# Patient Record
Sex: FEMALE | Race: BLACK OR AFRICAN AMERICAN | Employment: OTHER | URBAN - METROPOLITAN AREA
[De-identification: names, ages, dates, MRNs, and addresses within clinical notes are randomized per-mention and may not be internally consistent; named-entity substitution may affect disease eponyms.]

---

## 2017-01-11 RX ORDER — DOCUSATE SODIUM 100 MG/1
CAPSULE ORAL
Qty: 30 CAPSULE | Refills: 4 | Status: SHIPPED | OUTPATIENT
Start: 2017-01-11 | End: 2017-07-11 | Stop reason: SDUPTHER

## 2017-01-12 ENCOUNTER — OFFICE VISIT (OUTPATIENT)
Dept: CARDIOLOGY CLINIC | Age: 62
End: 2017-01-12

## 2017-01-12 VITALS
BODY MASS INDEX: 25.52 KG/M2 | HEART RATE: 77 BPM | DIASTOLIC BLOOD PRESSURE: 64 MMHG | HEIGHT: 60 IN | WEIGHT: 130 LBS | OXYGEN SATURATION: 99 % | SYSTOLIC BLOOD PRESSURE: 118 MMHG

## 2017-01-12 DIAGNOSIS — I25.119 CORONARY ARTERY DISEASE INVOLVING NATIVE CORONARY ARTERY OF NATIVE HEART WITH ANGINA PECTORIS (HCC): Primary | ICD-10-CM

## 2017-01-12 PROCEDURE — 99214 OFFICE O/P EST MOD 30 MIN: CPT | Performed by: INTERNAL MEDICINE

## 2017-01-13 ENCOUNTER — TELEPHONE (OUTPATIENT)
Dept: CARDIOLOGY CLINIC | Age: 62
End: 2017-01-13

## 2017-01-16 DIAGNOSIS — R19.7 DIARRHEA, UNSPECIFIED TYPE: ICD-10-CM

## 2017-01-19 ENCOUNTER — OFFICE VISIT (OUTPATIENT)
Dept: PAIN MANAGEMENT | Age: 62
End: 2017-01-19

## 2017-01-19 ENCOUNTER — TELEPHONE (OUTPATIENT)
Dept: PRIMARY CARE CLINIC | Age: 62
End: 2017-01-19

## 2017-01-19 VITALS
BODY MASS INDEX: 25 KG/M2 | WEIGHT: 128 LBS | SYSTOLIC BLOOD PRESSURE: 142 MMHG | DIASTOLIC BLOOD PRESSURE: 79 MMHG | HEART RATE: 89 BPM

## 2017-01-19 DIAGNOSIS — F51.01 PRIMARY INSOMNIA: ICD-10-CM

## 2017-01-19 DIAGNOSIS — M50.30 DDD (DEGENERATIVE DISC DISEASE), CERVICAL: ICD-10-CM

## 2017-01-19 DIAGNOSIS — G89.4 CHRONIC PAIN SYNDROME: ICD-10-CM

## 2017-01-19 DIAGNOSIS — G43.711 HEADACHE, CHRONIC MIGRAINE WITHOUT AURA, INTRACTABLE, WITH STATUS: ICD-10-CM

## 2017-01-19 DIAGNOSIS — F33.9 RECURRENT MAJOR DEPRESSIVE DISORDER, REMISSION STATUS UNSPECIFIED (HCC): ICD-10-CM

## 2017-01-19 DIAGNOSIS — M79.7 FIBROMYALGIA: ICD-10-CM

## 2017-01-19 DIAGNOSIS — F43.10 PTSD (POST-TRAUMATIC STRESS DISORDER): ICD-10-CM

## 2017-01-19 PROCEDURE — 99214 OFFICE O/P EST MOD 30 MIN: CPT | Performed by: INTERNAL MEDICINE

## 2017-01-19 RX ORDER — DULOXETIN HYDROCHLORIDE 30 MG/1
CAPSULE, DELAYED RELEASE ORAL
Qty: 60 CAPSULE | Refills: 0 | Status: SHIPPED | OUTPATIENT
Start: 2017-01-19 | End: 2017-02-20 | Stop reason: SDUPTHER

## 2017-01-19 RX ORDER — OXCARBAZEPINE 300 MG/1
TABLET, FILM COATED ORAL
Qty: 90 TABLET | Refills: 0 | Status: SHIPPED | OUTPATIENT
Start: 2017-01-19 | End: 2017-02-20 | Stop reason: SDUPTHER

## 2017-01-19 RX ORDER — QUETIAPINE FUMARATE 25 MG/1
TABLET, FILM COATED ORAL
Qty: 60 TABLET | Refills: 0 | Status: SHIPPED | OUTPATIENT
Start: 2017-01-19 | End: 2017-02-20 | Stop reason: SDUPTHER

## 2017-01-20 ENCOUNTER — OFFICE VISIT (OUTPATIENT)
Dept: PRIMARY CARE CLINIC | Age: 62
End: 2017-01-20

## 2017-01-20 VITALS
TEMPERATURE: 99.1 F | DIASTOLIC BLOOD PRESSURE: 83 MMHG | SYSTOLIC BLOOD PRESSURE: 176 MMHG | WEIGHT: 127.8 LBS | BODY MASS INDEX: 24.96 KG/M2 | HEART RATE: 93 BPM | OXYGEN SATURATION: 99 %

## 2017-01-20 DIAGNOSIS — R10.10 PAIN OF UPPER ABDOMEN: ICD-10-CM

## 2017-01-20 DIAGNOSIS — R19.7 DIARRHEA, UNSPECIFIED TYPE: Primary | ICD-10-CM

## 2017-01-20 PROCEDURE — 99214 OFFICE O/P EST MOD 30 MIN: CPT | Performed by: FAMILY MEDICINE

## 2017-01-20 ASSESSMENT — ENCOUNTER SYMPTOMS
CHOKING: 0
RHINORRHEA: 0
TROUBLE SWALLOWING: 0
RECTAL PAIN: 0
ABDOMINAL DISTENTION: 0
SINUS PRESSURE: 0
BLOOD IN STOOL: 0
FLATUS: 1
BLOATING: 1
SHORTNESS OF BREATH: 0
APNEA: 0
DIARRHEA: 1
EYE PAIN: 0
NAUSEA: 1
WHEEZING: 0
COLOR CHANGE: 0
STRIDOR: 0
COUGH: 0
PHOTOPHOBIA: 0
CONSTIPATION: 0
ABDOMINAL PAIN: 1
VOMITING: 0
EYE ITCHING: 0
BACK PAIN: 0
EYE REDNESS: 0
EYE DISCHARGE: 0
CHEST TIGHTNESS: 0
SORE THROAT: 0

## 2017-01-24 RX ORDER — LOPERAMIDE HYDROCHLORIDE 2 MG/1
CAPSULE ORAL
Qty: 20 CAPSULE | Refills: 0 | Status: SHIPPED | OUTPATIENT
Start: 2017-01-24 | End: 2017-04-10 | Stop reason: SDUPTHER

## 2017-01-25 ENCOUNTER — TELEPHONE (OUTPATIENT)
Dept: PAIN MANAGEMENT | Age: 62
End: 2017-01-25

## 2017-02-01 ENCOUNTER — TELEPHONE (OUTPATIENT)
Dept: PRIMARY CARE CLINIC | Age: 62
End: 2017-02-01

## 2017-02-03 DIAGNOSIS — E78.2 MIXED HYPERLIPIDEMIA: ICD-10-CM

## 2017-02-03 DIAGNOSIS — I25.10 CORONARY ARTERY DISEASE INVOLVING NATIVE CORONARY ARTERY OF NATIVE HEART WITHOUT ANGINA PECTORIS: ICD-10-CM

## 2017-02-03 RX ORDER — ATORVASTATIN CALCIUM 80 MG/1
TABLET, FILM COATED ORAL
Qty: 90 TABLET | Refills: 0 | Status: SHIPPED | OUTPATIENT
Start: 2017-02-03 | End: 2017-04-26 | Stop reason: SDUPTHER

## 2017-02-07 ENCOUNTER — TELEPHONE (OUTPATIENT)
Dept: PAIN MANAGEMENT | Age: 62
End: 2017-02-07

## 2017-02-10 DIAGNOSIS — K21.9 GERD (GASTROESOPHAGEAL REFLUX DISEASE): ICD-10-CM

## 2017-02-13 RX ORDER — OMEPRAZOLE 40 MG/1
CAPSULE, DELAYED RELEASE ORAL
Qty: 60 CAPSULE | Refills: 2 | Status: ON HOLD | OUTPATIENT
Start: 2017-02-13 | End: 2017-03-19 | Stop reason: HOSPADM

## 2017-02-20 ENCOUNTER — OFFICE VISIT (OUTPATIENT)
Dept: PAIN MANAGEMENT | Age: 62
End: 2017-02-20

## 2017-02-20 VITALS
WEIGHT: 138 LBS | BODY MASS INDEX: 26.95 KG/M2 | HEART RATE: 70 BPM | SYSTOLIC BLOOD PRESSURE: 140 MMHG | DIASTOLIC BLOOD PRESSURE: 81 MMHG

## 2017-02-20 DIAGNOSIS — M79.7 FIBROMYALGIA: ICD-10-CM

## 2017-02-20 DIAGNOSIS — F43.10 PTSD (POST-TRAUMATIC STRESS DISORDER): ICD-10-CM

## 2017-02-20 DIAGNOSIS — G89.4 CHRONIC PAIN SYNDROME: ICD-10-CM

## 2017-02-20 DIAGNOSIS — G43.119 INTRACTABLE MIGRAINE WITH AURA WITHOUT STATUS MIGRAINOSUS: ICD-10-CM

## 2017-02-20 DIAGNOSIS — M50.30 DDD (DEGENERATIVE DISC DISEASE), CERVICAL: ICD-10-CM

## 2017-02-20 DIAGNOSIS — G43.711 HEADACHE, CHRONIC MIGRAINE WITHOUT AURA, INTRACTABLE, WITH STATUS: ICD-10-CM

## 2017-02-20 PROCEDURE — 99213 OFFICE O/P EST LOW 20 MIN: CPT | Performed by: INTERNAL MEDICINE

## 2017-02-20 RX ORDER — DULOXETIN HYDROCHLORIDE 30 MG/1
CAPSULE, DELAYED RELEASE ORAL
Qty: 60 CAPSULE | Refills: 0 | Status: SHIPPED | OUTPATIENT
Start: 2017-02-20 | End: 2017-03-20 | Stop reason: SDUPTHER

## 2017-02-20 RX ORDER — QUETIAPINE FUMARATE 25 MG/1
TABLET, FILM COATED ORAL
Qty: 60 TABLET | Refills: 0 | Status: SHIPPED | OUTPATIENT
Start: 2017-02-20 | End: 2017-03-20 | Stop reason: SDUPTHER

## 2017-02-20 RX ORDER — HYDROCODONE BITARTRATE AND ACETAMINOPHEN 5; 325 MG/1; MG/1
0.5 TABLET ORAL EVERY 6 HOURS PRN
Qty: 30 TABLET | Refills: 0 | Status: SHIPPED | OUTPATIENT
Start: 2017-02-20 | End: 2017-03-20 | Stop reason: SDUPTHER

## 2017-02-20 RX ORDER — OXCARBAZEPINE 300 MG/1
TABLET, FILM COATED ORAL
Qty: 90 TABLET | Refills: 0 | Status: SHIPPED | OUTPATIENT
Start: 2017-02-20 | End: 2017-03-20 | Stop reason: SDUPTHER

## 2017-03-09 ENCOUNTER — TELEPHONE (OUTPATIENT)
Dept: PRIMARY CARE CLINIC | Age: 62
End: 2017-03-09

## 2017-03-14 ENCOUNTER — TELEPHONE (OUTPATIENT)
Dept: CARDIOLOGY CLINIC | Age: 62
End: 2017-03-14

## 2017-03-15 PROBLEM — R07.9 CHEST PAIN: Status: ACTIVE | Noted: 2017-03-15

## 2017-03-20 ENCOUNTER — CARE COORDINATION (OUTPATIENT)
Dept: CARE COORDINATION | Age: 62
End: 2017-03-20

## 2017-03-20 ENCOUNTER — OFFICE VISIT (OUTPATIENT)
Dept: PAIN MANAGEMENT | Age: 62
End: 2017-03-20

## 2017-03-20 VITALS
WEIGHT: 131.2 LBS | SYSTOLIC BLOOD PRESSURE: 130 MMHG | DIASTOLIC BLOOD PRESSURE: 66 MMHG | BODY MASS INDEX: 25.62 KG/M2 | HEART RATE: 80 BPM

## 2017-03-20 DIAGNOSIS — G89.4 CHRONIC PAIN SYNDROME: ICD-10-CM

## 2017-03-20 DIAGNOSIS — M79.7 FIBROMYALGIA: ICD-10-CM

## 2017-03-20 DIAGNOSIS — G43.119 INTRACTABLE MIGRAINE WITH AURA WITHOUT STATUS MIGRAINOSUS: ICD-10-CM

## 2017-03-20 DIAGNOSIS — M50.30 DDD (DEGENERATIVE DISC DISEASE), CERVICAL: ICD-10-CM

## 2017-03-20 PROCEDURE — 99213 OFFICE O/P EST LOW 20 MIN: CPT | Performed by: INTERNAL MEDICINE

## 2017-03-20 RX ORDER — QUETIAPINE FUMARATE 25 MG/1
TABLET, FILM COATED ORAL
Qty: 60 TABLET | Refills: 0 | Status: SHIPPED | OUTPATIENT
Start: 2017-03-20 | End: 2017-03-29 | Stop reason: SDUPTHER

## 2017-03-20 RX ORDER — DULOXETIN HYDROCHLORIDE 30 MG/1
CAPSULE, DELAYED RELEASE ORAL
Qty: 60 CAPSULE | Refills: 0 | Status: SHIPPED | OUTPATIENT
Start: 2017-03-20 | End: 2017-04-21 | Stop reason: SDUPTHER

## 2017-03-20 RX ORDER — OXCARBAZEPINE 300 MG/1
TABLET, FILM COATED ORAL
Qty: 90 TABLET | Refills: 0 | Status: SHIPPED | OUTPATIENT
Start: 2017-03-20 | End: 2017-04-21 | Stop reason: SDUPTHER

## 2017-03-20 RX ORDER — HYDROCODONE BITARTRATE AND ACETAMINOPHEN 5; 325 MG/1; MG/1
0.5 TABLET ORAL EVERY 6 HOURS PRN
Qty: 30 TABLET | Refills: 0 | Status: SHIPPED | OUTPATIENT
Start: 2017-03-20 | End: 2017-04-21 | Stop reason: SDUPTHER

## 2017-03-24 ENCOUNTER — TELEPHONE (OUTPATIENT)
Dept: PRIMARY CARE CLINIC | Age: 62
End: 2017-03-24

## 2017-03-24 DIAGNOSIS — N28.9 RENAL INSUFFICIENCY: Primary | ICD-10-CM

## 2017-03-28 ENCOUNTER — TELEPHONE (OUTPATIENT)
Dept: PAIN MANAGEMENT | Age: 62
End: 2017-03-28

## 2017-03-28 DIAGNOSIS — G89.4 CHRONIC PAIN SYNDROME: ICD-10-CM

## 2017-03-29 RX ORDER — QUETIAPINE FUMARATE 25 MG/1
TABLET, FILM COATED ORAL
Qty: 90 TABLET | Refills: 0 | Status: SHIPPED | OUTPATIENT
Start: 2017-03-29 | End: 2017-04-21 | Stop reason: SDUPTHER

## 2017-04-04 ENCOUNTER — TELEPHONE (OUTPATIENT)
Dept: PRIMARY CARE CLINIC | Age: 62
End: 2017-04-04

## 2017-04-06 DIAGNOSIS — F33.9 RECURRENT MAJOR DEPRESSIVE DISORDER, REMISSION STATUS UNSPECIFIED (HCC): ICD-10-CM

## 2017-04-06 DIAGNOSIS — G89.4 CHRONIC PAIN SYNDROME: ICD-10-CM

## 2017-04-10 ENCOUNTER — TELEPHONE (OUTPATIENT)
Dept: PRIMARY CARE CLINIC | Age: 62
End: 2017-04-10

## 2017-04-10 DIAGNOSIS — R19.7 DIARRHEA, UNSPECIFIED TYPE: ICD-10-CM

## 2017-04-10 RX ORDER — LOPERAMIDE HYDROCHLORIDE 2 MG/1
CAPSULE ORAL
Qty: 20 CAPSULE | Refills: 0 | Status: SHIPPED | OUTPATIENT
Start: 2017-04-10 | End: 2017-04-26

## 2017-04-11 ENCOUNTER — TELEPHONE (OUTPATIENT)
Dept: PRIMARY CARE CLINIC | Age: 62
End: 2017-04-11

## 2017-04-12 RX ORDER — DULOXETIN HYDROCHLORIDE 60 MG/1
CAPSULE, DELAYED RELEASE ORAL
Qty: 30 CAPSULE | OUTPATIENT
Start: 2017-04-12

## 2017-04-21 ENCOUNTER — OFFICE VISIT (OUTPATIENT)
Dept: PAIN MANAGEMENT | Age: 62
End: 2017-04-21

## 2017-04-21 VITALS
DIASTOLIC BLOOD PRESSURE: 72 MMHG | SYSTOLIC BLOOD PRESSURE: 171 MMHG | BODY MASS INDEX: 25.39 KG/M2 | HEART RATE: 63 BPM | WEIGHT: 130 LBS

## 2017-04-21 DIAGNOSIS — M79.7 FIBROMYALGIA: ICD-10-CM

## 2017-04-21 DIAGNOSIS — G43.119 INTRACTABLE MIGRAINE WITH AURA WITHOUT STATUS MIGRAINOSUS: ICD-10-CM

## 2017-04-21 DIAGNOSIS — G89.4 CHRONIC PAIN SYNDROME: ICD-10-CM

## 2017-04-21 DIAGNOSIS — M50.30 DDD (DEGENERATIVE DISC DISEASE), CERVICAL: ICD-10-CM

## 2017-04-21 PROCEDURE — 99213 OFFICE O/P EST LOW 20 MIN: CPT | Performed by: INTERNAL MEDICINE

## 2017-04-21 RX ORDER — QUETIAPINE FUMARATE 25 MG/1
TABLET, FILM COATED ORAL
Qty: 90 TABLET | Refills: 0 | Status: SHIPPED | OUTPATIENT
Start: 2017-04-21 | End: 2017-05-19

## 2017-04-21 RX ORDER — DULOXETIN HYDROCHLORIDE 30 MG/1
CAPSULE, DELAYED RELEASE ORAL
Qty: 60 CAPSULE | Refills: 0 | Status: SHIPPED | OUTPATIENT
Start: 2017-04-21 | End: 2017-05-19 | Stop reason: SDUPTHER

## 2017-04-21 RX ORDER — HYDROCODONE BITARTRATE AND ACETAMINOPHEN 5; 325 MG/1; MG/1
1 TABLET ORAL 3 TIMES DAILY PRN
Qty: 84 TABLET | Refills: 0 | Status: SHIPPED | OUTPATIENT
Start: 2017-04-21 | End: 2017-05-19 | Stop reason: SDUPTHER

## 2017-04-21 RX ORDER — OXCARBAZEPINE 300 MG/1
TABLET, FILM COATED ORAL
Qty: 90 TABLET | Refills: 0 | Status: SHIPPED | OUTPATIENT
Start: 2017-04-21 | End: 2017-08-24 | Stop reason: ALTCHOICE

## 2017-04-26 ENCOUNTER — OFFICE VISIT (OUTPATIENT)
Dept: PRIMARY CARE CLINIC | Age: 62
End: 2017-04-26

## 2017-04-26 VITALS
WEIGHT: 129 LBS | BODY MASS INDEX: 25.19 KG/M2 | TEMPERATURE: 99.2 F | SYSTOLIC BLOOD PRESSURE: 140 MMHG | DIASTOLIC BLOOD PRESSURE: 70 MMHG | RESPIRATION RATE: 18 BRPM | OXYGEN SATURATION: 99 % | HEART RATE: 66 BPM

## 2017-04-26 DIAGNOSIS — Z13.9 SCREENING: ICD-10-CM

## 2017-04-26 DIAGNOSIS — E78.2 MIXED HYPERLIPIDEMIA: ICD-10-CM

## 2017-04-26 DIAGNOSIS — N28.9 RENAL INSUFFICIENCY: ICD-10-CM

## 2017-04-26 DIAGNOSIS — I25.10 CORONARY ARTERY DISEASE INVOLVING NATIVE CORONARY ARTERY OF NATIVE HEART WITHOUT ANGINA PECTORIS: ICD-10-CM

## 2017-04-26 DIAGNOSIS — Z11.4 SCREENING FOR HIV (HUMAN IMMUNODEFICIENCY VIRUS): ICD-10-CM

## 2017-04-26 DIAGNOSIS — I10 ESSENTIAL HYPERTENSION: Primary | ICD-10-CM

## 2017-04-26 DIAGNOSIS — J40 BRONCHITIS: ICD-10-CM

## 2017-04-26 LAB
A/G RATIO: 1.3 (ref 1.1–2.2)
ALBUMIN SERPL-MCNC: 4.4 G/DL (ref 3.4–5)
ALP BLD-CCNC: 176 U/L (ref 40–129)
ALT SERPL-CCNC: 30 U/L (ref 10–40)
ANION GAP SERPL CALCULATED.3IONS-SCNC: 18 MMOL/L (ref 3–16)
AST SERPL-CCNC: 32 U/L (ref 15–37)
BACTERIA: ABNORMAL /HPF
BILIRUB SERPL-MCNC: 0.3 MG/DL (ref 0–1)
BILIRUBIN URINE: NEGATIVE
BLOOD, URINE: NEGATIVE
BUN BLDV-MCNC: 19 MG/DL (ref 7–20)
CALCIUM SERPL-MCNC: 9.6 MG/DL (ref 8.3–10.6)
CHLORIDE BLD-SCNC: 95 MMOL/L (ref 99–110)
CLARITY: CLEAR
CO2: 19 MMOL/L (ref 21–32)
COLOR: YELLOW
CREAT SERPL-MCNC: 1 MG/DL (ref 0.6–1.2)
EPITHELIAL CELLS, UA: 2 /HPF (ref 0–5)
GFR AFRICAN AMERICAN: >60
GFR NON-AFRICAN AMERICAN: 56
GLOBULIN: 3.3 G/DL
GLUCOSE BLD-MCNC: 144 MG/DL (ref 70–99)
GLUCOSE URINE: NEGATIVE MG/DL
HBA1C MFR BLD: 9.7 %
HYALINE CASTS: 3 /LPF (ref 0–8)
KETONES, URINE: NEGATIVE MG/DL
LEUKOCYTE ESTERASE, URINE: NEGATIVE
MICROSCOPIC EXAMINATION: YES
NITRITE, URINE: NEGATIVE
PH UA: 6.5
POTASSIUM SERPL-SCNC: 3.6 MMOL/L (ref 3.5–5.1)
PROTEIN UA: 100 MG/DL
RBC UA: 3 /HPF (ref 0–4)
SODIUM BLD-SCNC: 132 MMOL/L (ref 136–145)
SPECIFIC GRAVITY UA: 1.02
TOTAL PROTEIN: 7.7 G/DL (ref 6.4–8.2)
URINE TYPE: ABNORMAL
UROBILINOGEN, URINE: 1 E.U./DL
WBC UA: 1 /HPF (ref 0–5)

## 2017-04-26 PROCEDURE — 83036 HEMOGLOBIN GLYCOSYLATED A1C: CPT | Performed by: INTERNAL MEDICINE

## 2017-04-26 PROCEDURE — 99214 OFFICE O/P EST MOD 30 MIN: CPT | Performed by: INTERNAL MEDICINE

## 2017-04-26 RX ORDER — ISOSORBIDE MONONITRATE 30 MG/1
30 TABLET, EXTENDED RELEASE ORAL DAILY
Qty: 30 TABLET | Status: CANCELLED | OUTPATIENT
Start: 2017-04-26

## 2017-04-26 RX ORDER — AZITHROMYCIN 250 MG/1
TABLET, FILM COATED ORAL
Qty: 1 PACKET | Refills: 0 | Status: SHIPPED | OUTPATIENT
Start: 2017-04-26 | End: 2017-05-06

## 2017-04-26 RX ORDER — LOPERAMIDE HYDROCHLORIDE 2 MG/1
CAPSULE ORAL
Qty: 20 CAPSULE | Refills: 0 | Status: CANCELLED | OUTPATIENT
Start: 2017-04-26

## 2017-04-26 RX ORDER — NITROGLYCERIN 0.4 MG/1
TABLET SUBLINGUAL
Qty: 25 TABLET | Refills: 4 | Status: CANCELLED | OUTPATIENT
Start: 2017-04-26

## 2017-04-26 RX ORDER — ISOSORBIDE MONONITRATE 30 MG/1
30 TABLET, EXTENDED RELEASE ORAL DAILY
Qty: 30 TABLET | Refills: 5 | Status: SHIPPED | OUTPATIENT
Start: 2017-04-26 | End: 2017-11-07 | Stop reason: SDUPTHER

## 2017-04-26 RX ORDER — ATORVASTATIN CALCIUM 80 MG/1
80 TABLET, FILM COATED ORAL DAILY
Qty: 90 TABLET | Refills: 3 | Status: SHIPPED | OUTPATIENT
Start: 2017-04-26 | End: 2017-12-19 | Stop reason: SDUPTHER

## 2017-04-26 RX ORDER — ATORVASTATIN CALCIUM 80 MG/1
TABLET, FILM COATED ORAL
Qty: 90 TABLET | Refills: 0 | Status: CANCELLED | OUTPATIENT
Start: 2017-04-26

## 2017-04-26 RX ORDER — DOCUSATE SODIUM 100 MG/1
CAPSULE, LIQUID FILLED ORAL
Qty: 30 CAPSULE | Refills: 4 | Status: CANCELLED | OUTPATIENT
Start: 2017-04-26

## 2017-04-26 RX ORDER — CHLORTHALIDONE 25 MG/1
25 TABLET ORAL DAILY
Qty: 30 TABLET | Status: CANCELLED | OUTPATIENT
Start: 2017-04-26

## 2017-04-26 RX ORDER — TORSEMIDE 20 MG/1
20 TABLET ORAL DAILY
Qty: 30 TABLET | Status: CANCELLED | OUTPATIENT
Start: 2017-04-26

## 2017-04-26 RX ORDER — PANTOPRAZOLE SODIUM 40 MG/1
40 TABLET, DELAYED RELEASE ORAL
Qty: 30 TABLET | Refills: 3 | Status: CANCELLED | OUTPATIENT
Start: 2017-04-26

## 2017-04-26 RX ORDER — BUDESONIDE AND FORMOTEROL FUMARATE DIHYDRATE 160; 4.5 UG/1; UG/1
2 AEROSOL RESPIRATORY (INHALATION) DAILY
Qty: 1 INHALER | Refills: 5 | Status: SHIPPED | OUTPATIENT
Start: 2017-04-26 | End: 2017-11-07 | Stop reason: SDUPTHER

## 2017-04-26 RX ORDER — ALBUTEROL SULFATE 90 UG/1
2 AEROSOL, METERED RESPIRATORY (INHALATION) EVERY 6 HOURS PRN
Qty: 1 INHALER | Refills: 3 | Status: SHIPPED | OUTPATIENT
Start: 2017-04-26 | End: 2017-11-07 | Stop reason: SDUPTHER

## 2017-04-26 RX ORDER — SUMATRIPTAN 100 MG/1
100 TABLET, FILM COATED ORAL 2 TIMES DAILY PRN
Qty: 9 TABLET | Status: CANCELLED | OUTPATIENT
Start: 2017-04-26

## 2017-04-26 RX ORDER — CLOPIDOGREL BISULFATE 75 MG/1
75 TABLET ORAL DAILY
Qty: 90 TABLET | Refills: 1 | Status: CANCELLED | OUTPATIENT
Start: 2017-04-26

## 2017-04-26 RX ORDER — ASPIRIN 81 MG/1
81 TABLET ORAL DAILY
Qty: 90 TABLET | Refills: 5 | Status: SHIPPED | OUTPATIENT
Start: 2017-04-26 | End: 2017-12-19 | Stop reason: SDUPTHER

## 2017-04-26 RX ORDER — METOCLOPRAMIDE 10 MG/1
10 TABLET ORAL
Qty: 120 TABLET | Refills: 3 | Status: CANCELLED | OUTPATIENT
Start: 2017-04-26

## 2017-04-26 RX ORDER — AMLODIPINE BESYLATE 10 MG/1
10 TABLET ORAL DAILY
Qty: 30 TABLET | Refills: 5 | Status: CANCELLED | OUTPATIENT
Start: 2017-04-26

## 2017-04-26 RX ORDER — TORSEMIDE 20 MG/1
20 TABLET ORAL DAILY
Qty: 30 TABLET | Refills: 5 | Status: SHIPPED | OUTPATIENT
Start: 2017-04-26 | End: 2017-11-07 | Stop reason: SDUPTHER

## 2017-04-26 RX ORDER — TORSEMIDE 20 MG/1
TABLET ORAL
Qty: 30 TABLET | Refills: 2 | Status: CANCELLED | OUTPATIENT
Start: 2017-04-26

## 2017-04-26 RX ORDER — ALBUTEROL SULFATE 90 UG/1
AEROSOL, METERED RESPIRATORY (INHALATION)
Qty: 1 INHALER | Refills: 2 | Status: CANCELLED | OUTPATIENT
Start: 2017-04-26

## 2017-04-26 RX ORDER — RANOLAZINE 500 MG/1
500 TABLET, EXTENDED RELEASE ORAL 2 TIMES DAILY
Qty: 180 TABLET | Refills: 1 | Status: CANCELLED | OUTPATIENT
Start: 2017-04-26

## 2017-04-26 RX ORDER — ASPIRIN 81 MG/1
81 TABLET ORAL DAILY
Qty: 90 TABLET | Refills: 4 | Status: CANCELLED | OUTPATIENT
Start: 2017-04-26

## 2017-04-26 RX ORDER — METOPROLOL SUCCINATE 25 MG/1
12.5 TABLET, EXTENDED RELEASE ORAL 2 TIMES DAILY
Qty: 60 TABLET | Refills: 5 | Status: SHIPPED | OUTPATIENT
Start: 2017-04-26 | End: 2017-11-07 | Stop reason: SDUPTHER

## 2017-04-26 RX ORDER — AMLODIPINE BESYLATE 10 MG/1
10 TABLET ORAL DAILY
Qty: 30 TABLET | Refills: 5 | Status: SHIPPED | OUTPATIENT
Start: 2017-04-26 | End: 2017-11-07 | Stop reason: SDUPTHER

## 2017-04-26 RX ORDER — METOPROLOL SUCCINATE 25 MG/1
25 TABLET, EXTENDED RELEASE ORAL DAILY
Qty: 30 TABLET | Refills: 3 | Status: CANCELLED | OUTPATIENT
Start: 2017-04-26

## 2017-04-26 ASSESSMENT — ENCOUNTER SYMPTOMS
GASTROINTESTINAL NEGATIVE: 1
ABDOMINAL PAIN: 0
NAUSEA: 0
SINUS PRESSURE: 0
CHEST TIGHTNESS: 0
EYE PAIN: 0
VISUAL CHANGE: 0
PHOTOPHOBIA: 0
EYE REDNESS: 0
SHORTNESS OF BREATH: 1
EYES NEGATIVE: 1
ABDOMINAL DISTENTION: 0
SORE THROAT: 0
WHEEZING: 1
COUGH: 1

## 2017-04-28 LAB — HIV-1 WESTERN BLOT: NEGATIVE

## 2017-05-04 ENCOUNTER — TELEPHONE (OUTPATIENT)
Dept: PRIMARY CARE CLINIC | Age: 62
End: 2017-05-04

## 2017-05-10 DIAGNOSIS — G62.9 NEUROPATHY: ICD-10-CM

## 2017-05-11 ENCOUNTER — TELEPHONE (OUTPATIENT)
Dept: PRIMARY CARE CLINIC | Age: 62
End: 2017-05-11

## 2017-05-11 RX ORDER — ONDANSETRON 4 MG/1
4 TABLET, FILM COATED ORAL EVERY 8 HOURS PRN
Qty: 30 TABLET | Refills: 1 | Status: SHIPPED | OUTPATIENT
Start: 2017-05-11 | End: 2017-07-25 | Stop reason: SDUPTHER

## 2017-05-11 RX ORDER — NITROGLYCERIN 0.4 MG/1
TABLET SUBLINGUAL
Qty: 25 TABLET | Refills: 3 | Status: SHIPPED | OUTPATIENT
Start: 2017-05-11 | End: 2017-10-10 | Stop reason: SDUPTHER

## 2017-05-11 RX ORDER — TOPIRAMATE 50 MG/1
TABLET, FILM COATED ORAL
Qty: 180 TABLET | Refills: 0 | Status: SHIPPED | OUTPATIENT
Start: 2017-05-11 | End: 2017-08-10 | Stop reason: SDUPTHER

## 2017-05-15 ENCOUNTER — TELEPHONE (OUTPATIENT)
Dept: PRIMARY CARE CLINIC | Age: 62
End: 2017-05-15

## 2017-05-19 ENCOUNTER — OFFICE VISIT (OUTPATIENT)
Dept: PAIN MANAGEMENT | Age: 62
End: 2017-05-19

## 2017-05-19 ENCOUNTER — TELEPHONE (OUTPATIENT)
Dept: FAMILY MEDICINE CLINIC | Age: 62
End: 2017-05-19

## 2017-05-19 VITALS
WEIGHT: 129 LBS | HEART RATE: 80 BPM | DIASTOLIC BLOOD PRESSURE: 76 MMHG | SYSTOLIC BLOOD PRESSURE: 130 MMHG | BODY MASS INDEX: 25.19 KG/M2

## 2017-05-19 DIAGNOSIS — G89.4 CHRONIC PAIN SYNDROME: ICD-10-CM

## 2017-05-19 DIAGNOSIS — F51.01 PRIMARY INSOMNIA: ICD-10-CM

## 2017-05-19 DIAGNOSIS — M79.7 FIBROMYALGIA: ICD-10-CM

## 2017-05-19 DIAGNOSIS — G43.711 HEADACHE, CHRONIC MIGRAINE WITHOUT AURA, INTRACTABLE, WITH STATUS: ICD-10-CM

## 2017-05-19 DIAGNOSIS — F33.1 MODERATE EPISODE OF RECURRENT MAJOR DEPRESSIVE DISORDER (HCC): ICD-10-CM

## 2017-05-19 PROCEDURE — 99214 OFFICE O/P EST MOD 30 MIN: CPT | Performed by: INTERNAL MEDICINE

## 2017-05-19 RX ORDER — HYDROCODONE BITARTRATE AND ACETAMINOPHEN 5; 325 MG/1; MG/1
1 TABLET ORAL EVERY 6 HOURS PRN
Qty: 120 TABLET | Refills: 0 | Status: SHIPPED | OUTPATIENT
Start: 2017-05-19 | End: 2017-06-30 | Stop reason: SDUPTHER

## 2017-05-19 RX ORDER — AMITRIPTYLINE HYDROCHLORIDE 25 MG/1
25-50 TABLET, FILM COATED ORAL NIGHTLY
Qty: 60 TABLET | Refills: 1 | Status: SHIPPED | OUTPATIENT
Start: 2017-05-19 | End: 2017-06-30

## 2017-05-19 RX ORDER — DULOXETIN HYDROCHLORIDE 30 MG/1
CAPSULE, DELAYED RELEASE ORAL
Qty: 60 CAPSULE | Refills: 1 | Status: SHIPPED | OUTPATIENT
Start: 2017-05-19 | End: 2017-06-30 | Stop reason: SDUPTHER

## 2017-05-19 RX ORDER — OXCARBAZEPINE 600 MG/1
600 TABLET, FILM COATED ORAL 2 TIMES DAILY
Qty: 60 TABLET | Refills: 1 | Status: SHIPPED | OUTPATIENT
Start: 2017-05-19 | End: 2017-06-30 | Stop reason: SDUPTHER

## 2017-05-19 RX ORDER — METRONIDAZOLE 500 MG/1
500 TABLET ORAL 3 TIMES DAILY
Qty: 30 TABLET | Refills: 0 | Status: SHIPPED | OUTPATIENT
Start: 2017-05-19 | End: 2017-05-29

## 2017-05-24 ENCOUNTER — TELEPHONE (OUTPATIENT)
Dept: PRIMARY CARE CLINIC | Age: 62
End: 2017-05-24

## 2017-05-25 ENCOUNTER — TELEPHONE (OUTPATIENT)
Dept: PRIMARY CARE CLINIC | Age: 62
End: 2017-05-25

## 2017-05-25 DIAGNOSIS — R19.7 DIARRHEA, UNSPECIFIED TYPE: Primary | ICD-10-CM

## 2017-06-09 DIAGNOSIS — I10 ESSENTIAL HYPERTENSION: ICD-10-CM

## 2017-06-12 RX ORDER — CHLORTHALIDONE 25 MG/1
TABLET ORAL
Qty: 90 TABLET | Refills: 0 | Status: SHIPPED | OUTPATIENT
Start: 2017-06-12 | End: 2017-09-08 | Stop reason: SDUPTHER

## 2017-06-20 ENCOUNTER — TELEPHONE (OUTPATIENT)
Dept: CARDIOLOGY CLINIC | Age: 62
End: 2017-06-20

## 2017-06-23 ENCOUNTER — TELEPHONE (OUTPATIENT)
Dept: PAIN MANAGEMENT | Age: 62
End: 2017-06-23

## 2017-06-30 ENCOUNTER — OFFICE VISIT (OUTPATIENT)
Dept: PAIN MANAGEMENT | Age: 62
End: 2017-06-30

## 2017-06-30 ENCOUNTER — TELEPHONE (OUTPATIENT)
Dept: CARDIOLOGY CLINIC | Age: 62
End: 2017-06-30

## 2017-06-30 ENCOUNTER — TELEPHONE (OUTPATIENT)
Dept: PRIMARY CARE CLINIC | Age: 62
End: 2017-06-30

## 2017-06-30 VITALS
DIASTOLIC BLOOD PRESSURE: 71 MMHG | SYSTOLIC BLOOD PRESSURE: 128 MMHG | BODY MASS INDEX: 24.41 KG/M2 | HEART RATE: 59 BPM | WEIGHT: 125 LBS

## 2017-06-30 DIAGNOSIS — G89.4 CHRONIC PAIN SYNDROME: ICD-10-CM

## 2017-06-30 DIAGNOSIS — M79.7 FIBROMYALGIA: ICD-10-CM

## 2017-06-30 PROCEDURE — 99213 OFFICE O/P EST LOW 20 MIN: CPT | Performed by: INTERNAL MEDICINE

## 2017-06-30 RX ORDER — HYDROCODONE BITARTRATE AND ACETAMINOPHEN 5; 325 MG/1; MG/1
1 TABLET ORAL EVERY 6 HOURS PRN
Qty: 90 TABLET | Refills: 0 | Status: SHIPPED | OUTPATIENT
Start: 2017-06-30 | End: 2017-08-03 | Stop reason: SDUPTHER

## 2017-06-30 RX ORDER — OXCARBAZEPINE 600 MG/1
600 TABLET, FILM COATED ORAL 2 TIMES DAILY
Qty: 60 TABLET | Refills: 0 | Status: SHIPPED | OUTPATIENT
Start: 2017-06-30 | End: 2017-08-03 | Stop reason: SDUPTHER

## 2017-06-30 RX ORDER — TRAZODONE HYDROCHLORIDE 50 MG/1
50 TABLET ORAL NIGHTLY
Qty: 60 TABLET | Refills: 0 | Status: SHIPPED | OUTPATIENT
Start: 2017-06-30 | End: 2017-08-03 | Stop reason: SDUPTHER

## 2017-06-30 RX ORDER — DULOXETIN HYDROCHLORIDE 30 MG/1
CAPSULE, DELAYED RELEASE ORAL
Qty: 60 CAPSULE | Refills: 0 | Status: SHIPPED | OUTPATIENT
Start: 2017-06-30 | End: 2017-08-03 | Stop reason: SDUPTHER

## 2017-07-06 ENCOUNTER — TELEPHONE (OUTPATIENT)
Dept: PRIMARY CARE CLINIC | Age: 62
End: 2017-07-06

## 2017-07-11 RX ORDER — DOCUSATE SODIUM 100 MG/1
CAPSULE ORAL
Qty: 30 CAPSULE | Refills: 3 | Status: SHIPPED | OUTPATIENT
Start: 2017-07-11 | End: 2017-11-10 | Stop reason: SDUPTHER

## 2017-07-13 ENCOUNTER — OFFICE VISIT (OUTPATIENT)
Dept: PRIMARY CARE CLINIC | Age: 62
End: 2017-07-13

## 2017-07-13 VITALS
TEMPERATURE: 98.2 F | HEART RATE: 84 BPM | SYSTOLIC BLOOD PRESSURE: 160 MMHG | DIASTOLIC BLOOD PRESSURE: 82 MMHG | BODY MASS INDEX: 24.41 KG/M2 | WEIGHT: 125 LBS | OXYGEN SATURATION: 96 %

## 2017-07-13 DIAGNOSIS — R14.0 ABDOMINAL BLOATING: ICD-10-CM

## 2017-07-13 DIAGNOSIS — N18.30 CKD (CHRONIC KIDNEY DISEASE) STAGE 3, GFR 30-59 ML/MIN (HCC): ICD-10-CM

## 2017-07-13 DIAGNOSIS — K21.9 GASTROESOPHAGEAL REFLUX DISEASE WITHOUT ESOPHAGITIS: ICD-10-CM

## 2017-07-13 DIAGNOSIS — I10 ESSENTIAL HYPERTENSION: ICD-10-CM

## 2017-07-13 DIAGNOSIS — R11.0 NAUSEA: ICD-10-CM

## 2017-07-13 PROBLEM — I48.91 A-FIB (HCC): Status: ACTIVE | Noted: 2017-05-05

## 2017-07-13 PROBLEM — M31.6 GIANT CELL ARTERITIS (HCC): Status: ACTIVE | Noted: 2017-05-05

## 2017-07-13 PROBLEM — K58.9 IRRITABLE BOWEL SYNDROME: Status: ACTIVE | Noted: 2017-05-05

## 2017-07-13 LAB
CREATININE URINE: 208.6 MG/DL (ref 28–259)
HBA1C MFR BLD: 9 %
MICROALBUMIN UR-MCNC: 37.7 MG/DL
MICROALBUMIN/CREAT UR-RTO: 180.7 MG/G (ref 0–30)

## 2017-07-13 PROCEDURE — 83036 HEMOGLOBIN GLYCOSYLATED A1C: CPT | Performed by: NURSE PRACTITIONER

## 2017-07-13 PROCEDURE — 99214 OFFICE O/P EST MOD 30 MIN: CPT | Performed by: NURSE PRACTITIONER

## 2017-07-13 RX ORDER — RANITIDINE 300 MG/1
300 TABLET ORAL NIGHTLY
Qty: 30 TABLET | Refills: 3 | Status: SHIPPED | OUTPATIENT
Start: 2017-07-13 | End: 2017-09-20

## 2017-07-13 ASSESSMENT — ENCOUNTER SYMPTOMS
EYES NEGATIVE: 1
WHEEZING: 0
ABDOMINAL PAIN: 1
SORE THROAT: 0
DIARRHEA: 0
BLOOD IN STOOL: 0
EYE REDNESS: 0
SINUS PRESSURE: 0
CHEST TIGHTNESS: 0
NAUSEA: 1
COUGH: 0
VOMITING: 0
ABDOMINAL DISTENTION: 1
EYE PAIN: 0
SHORTNESS OF BREATH: 0
PHOTOPHOBIA: 0

## 2017-07-17 ENCOUNTER — OFFICE VISIT (OUTPATIENT)
Dept: CARDIOLOGY CLINIC | Age: 62
End: 2017-07-17

## 2017-07-17 ENCOUNTER — TELEPHONE (OUTPATIENT)
Dept: CARDIOLOGY CLINIC | Age: 62
End: 2017-07-17

## 2017-07-17 VITALS
HEIGHT: 60 IN | BODY MASS INDEX: 23.95 KG/M2 | SYSTOLIC BLOOD PRESSURE: 106 MMHG | OXYGEN SATURATION: 98 % | DIASTOLIC BLOOD PRESSURE: 64 MMHG | HEART RATE: 63 BPM | WEIGHT: 122 LBS

## 2017-07-17 DIAGNOSIS — I48.91 ATRIAL FIBRILLATION, UNSPECIFIED TYPE (HCC): Primary | ICD-10-CM

## 2017-07-17 PROCEDURE — 93000 ELECTROCARDIOGRAM COMPLETE: CPT | Performed by: INTERNAL MEDICINE

## 2017-07-17 PROCEDURE — 99212 OFFICE O/P EST SF 10 MIN: CPT | Performed by: INTERNAL MEDICINE

## 2017-07-18 ENCOUNTER — TELEPHONE (OUTPATIENT)
Dept: PAIN MANAGEMENT | Age: 62
End: 2017-07-18

## 2017-07-18 ENCOUNTER — TELEPHONE (OUTPATIENT)
Dept: PRIMARY CARE CLINIC | Age: 62
End: 2017-07-18

## 2017-07-19 DIAGNOSIS — G43.009 NONINTRACTABLE MIGRAINE, UNSPECIFIED MIGRAINE TYPE: ICD-10-CM

## 2017-07-19 RX ORDER — SUMATRIPTAN 100 MG/1
TABLET, FILM COATED ORAL
Qty: 9 TABLET | Refills: 2 | Status: SHIPPED | OUTPATIENT
Start: 2017-07-19 | End: 2018-09-07 | Stop reason: SDUPTHER

## 2017-07-25 RX ORDER — ONDANSETRON 4 MG/1
TABLET, FILM COATED ORAL
Qty: 30 TABLET | Refills: 0 | Status: SHIPPED | OUTPATIENT
Start: 2017-07-25 | End: 2017-09-11 | Stop reason: SDUPTHER

## 2017-08-03 ENCOUNTER — OFFICE VISIT (OUTPATIENT)
Dept: PAIN MANAGEMENT | Age: 62
End: 2017-08-03

## 2017-08-03 VITALS
BODY MASS INDEX: 24.22 KG/M2 | SYSTOLIC BLOOD PRESSURE: 176 MMHG | HEART RATE: 66 BPM | DIASTOLIC BLOOD PRESSURE: 79 MMHG | WEIGHT: 124 LBS

## 2017-08-03 DIAGNOSIS — G89.4 CHRONIC PAIN SYNDROME: ICD-10-CM

## 2017-08-03 DIAGNOSIS — M79.7 FIBROMYALGIA: ICD-10-CM

## 2017-08-03 PROCEDURE — 99213 OFFICE O/P EST LOW 20 MIN: CPT | Performed by: INTERNAL MEDICINE

## 2017-08-03 RX ORDER — OXCARBAZEPINE 600 MG/1
600 TABLET, FILM COATED ORAL 2 TIMES DAILY
Qty: 60 TABLET | Refills: 0 | Status: SHIPPED | OUTPATIENT
Start: 2017-08-03 | End: 2017-09-09 | Stop reason: SDUPTHER

## 2017-08-03 RX ORDER — HYDROCODONE BITARTRATE AND ACETAMINOPHEN 5; 325 MG/1; MG/1
1 TABLET ORAL EVERY 6 HOURS PRN
Qty: 126 TABLET | Refills: 0 | Status: SHIPPED | OUTPATIENT
Start: 2017-08-03 | End: 2017-09-14 | Stop reason: ALTCHOICE

## 2017-08-03 RX ORDER — DULOXETIN HYDROCHLORIDE 30 MG/1
CAPSULE, DELAYED RELEASE ORAL
Qty: 60 CAPSULE | Refills: 0 | Status: SHIPPED | OUTPATIENT
Start: 2017-08-03 | End: 2017-09-08 | Stop reason: SDUPTHER

## 2017-08-03 RX ORDER — TRAZODONE HYDROCHLORIDE 50 MG/1
50 TABLET ORAL NIGHTLY
Qty: 60 TABLET | Refills: 0 | Status: SHIPPED | OUTPATIENT
Start: 2017-08-03 | End: 2017-09-14 | Stop reason: SDUPTHER

## 2017-08-08 ENCOUNTER — OFFICE VISIT (OUTPATIENT)
Dept: PRIMARY CARE CLINIC | Age: 62
End: 2017-08-08

## 2017-08-08 VITALS
TEMPERATURE: 98.2 F | HEIGHT: 60 IN | SYSTOLIC BLOOD PRESSURE: 150 MMHG | RESPIRATION RATE: 16 BRPM | BODY MASS INDEX: 23.95 KG/M2 | OXYGEN SATURATION: 97 % | HEART RATE: 79 BPM | WEIGHT: 122 LBS | DIASTOLIC BLOOD PRESSURE: 70 MMHG

## 2017-08-08 DIAGNOSIS — I10 ESSENTIAL HYPERTENSION: ICD-10-CM

## 2017-08-08 DIAGNOSIS — L02.214 ABSCESS OF GROIN, RIGHT: Primary | ICD-10-CM

## 2017-08-08 PROCEDURE — 99214 OFFICE O/P EST MOD 30 MIN: CPT | Performed by: INTERNAL MEDICINE

## 2017-08-08 RX ORDER — CEPHALEXIN 500 MG/1
500 CAPSULE ORAL 4 TIMES DAILY
Qty: 40 CAPSULE | Refills: 0 | Status: SHIPPED | OUTPATIENT
Start: 2017-08-08 | End: 2017-08-17 | Stop reason: ALTCHOICE

## 2017-08-08 RX ORDER — TIZANIDINE 4 MG/1
4 TABLET ORAL EVERY 8 HOURS PRN
Qty: 20 TABLET | Refills: 0 | Status: SHIPPED | OUTPATIENT
Start: 2017-08-08 | End: 2017-10-12

## 2017-08-08 ASSESSMENT — ENCOUNTER SYMPTOMS
SINUS PRESSURE: 0
EYE PAIN: 0
SORE THROAT: 0
EYE REDNESS: 0
PHOTOPHOBIA: 0
VISUAL CHANGE: 0
EYES NEGATIVE: 1
ABDOMINAL PAIN: 0
CHEST TIGHTNESS: 0
GASTROINTESTINAL NEGATIVE: 1
NAUSEA: 0
ABDOMINAL DISTENTION: 0

## 2017-08-10 ENCOUNTER — HOSPITAL ENCOUNTER (OUTPATIENT)
Dept: WOUND CARE | Age: 62
Discharge: HOME | End: 2017-08-10
Attending: SURGERY | Admitting: SURGERY

## 2017-08-10 VITALS
WEIGHT: 123.46 LBS | TEMPERATURE: 98.5 F | RESPIRATION RATE: 16 BRPM | HEIGHT: 60 IN | SYSTOLIC BLOOD PRESSURE: 177 MMHG | DIASTOLIC BLOOD PRESSURE: 76 MMHG | HEART RATE: 73 BPM | BODY MASS INDEX: 24.24 KG/M2

## 2017-08-10 DIAGNOSIS — G62.9 NEUROPATHY: ICD-10-CM

## 2017-08-10 DIAGNOSIS — G89.4 CHRONIC PAIN SYNDROME: ICD-10-CM

## 2017-08-10 PROCEDURE — 10060 I&D ABSCESS SIMPLE/SINGLE: CPT | Performed by: SURGERY

## 2017-08-10 RX ORDER — TOPIRAMATE 50 MG/1
TABLET, FILM COATED ORAL
Qty: 180 TABLET | Refills: 3 | Status: SHIPPED | OUTPATIENT
Start: 2017-08-10 | End: 2018-02-23

## 2017-08-10 RX ORDER — LIDOCAINE HYDROCHLORIDE 20 MG/ML
7 INJECTION, SOLUTION INFILTRATION; PERINEURAL ONCE
Status: DISCONTINUED | OUTPATIENT
Start: 2017-08-10 | End: 2017-08-11 | Stop reason: HOSPADM

## 2017-08-10 RX ORDER — LIDOCAINE HYDROCHLORIDE 40 MG/ML
SOLUTION TOPICAL ONCE
Status: DISCONTINUED | OUTPATIENT
Start: 2017-08-10 | End: 2017-08-11 | Stop reason: HOSPADM

## 2017-08-10 ASSESSMENT — PAIN SCALES - GENERAL: PAINLEVEL_OUTOF10: 10

## 2017-08-10 ASSESSMENT — PAIN DESCRIPTION - DESCRIPTORS: DESCRIPTORS: SORE;BURNING

## 2017-08-10 ASSESSMENT — PAIN DESCRIPTION - ORIENTATION: ORIENTATION: RIGHT

## 2017-08-10 ASSESSMENT — PAIN DESCRIPTION - PAIN TYPE: TYPE: ACUTE PAIN

## 2017-08-10 ASSESSMENT — PAIN DESCRIPTION - LOCATION: LOCATION: GROIN

## 2017-08-10 ASSESSMENT — PAIN DESCRIPTION - FREQUENCY: FREQUENCY: CONTINUOUS

## 2017-08-11 ENCOUNTER — TELEPHONE (OUTPATIENT)
Dept: PAIN MANAGEMENT | Age: 62
End: 2017-08-11

## 2017-08-11 RX ORDER — QUETIAPINE FUMARATE 25 MG/1
TABLET, FILM COATED ORAL
Qty: 90 TABLET | OUTPATIENT
Start: 2017-08-11

## 2017-08-12 ENCOUNTER — POST-OP TELEPHONE (OUTPATIENT)
Dept: SURGERY | Age: 62
End: 2017-08-12

## 2017-08-12 LAB
GRAM STAIN RESULT: ABNORMAL
ORGANISM: ABNORMAL
WOUND/ABSCESS: ABNORMAL

## 2017-08-17 ENCOUNTER — HOSPITAL ENCOUNTER (OUTPATIENT)
Dept: WOUND CARE | Age: 62
Discharge: HOME | End: 2017-08-17
Attending: SURGERY | Admitting: SURGERY

## 2017-08-17 VITALS
HEART RATE: 72 BPM | SYSTOLIC BLOOD PRESSURE: 195 MMHG | RESPIRATION RATE: 16 BRPM | DIASTOLIC BLOOD PRESSURE: 91 MMHG | TEMPERATURE: 99.2 F

## 2017-08-17 DIAGNOSIS — L02.214 ABSCESS OF GROIN, RIGHT: ICD-10-CM

## 2017-08-17 DIAGNOSIS — Z95.828 PORT-A-CATH IN PLACE: Primary | ICD-10-CM

## 2017-08-17 PROCEDURE — 11042 DBRDMT SUBQ TIS 1ST 20SQCM/<: CPT | Performed by: SURGERY

## 2017-08-17 RX ORDER — LIDOCAINE HYDROCHLORIDE 40 MG/ML
SOLUTION TOPICAL ONCE
Status: DISCONTINUED | OUTPATIENT
Start: 2017-08-17 | End: 2017-08-18 | Stop reason: HOSPADM

## 2017-08-17 ASSESSMENT — PAIN DESCRIPTION - LOCATION: LOCATION: GROIN

## 2017-08-17 ASSESSMENT — PAIN DESCRIPTION - ORIENTATION: ORIENTATION: RIGHT

## 2017-08-17 ASSESSMENT — PAIN DESCRIPTION - PAIN TYPE: TYPE: ACUTE PAIN

## 2017-08-17 ASSESSMENT — PAIN DESCRIPTION - DESCRIPTORS: DESCRIPTORS: SHARP

## 2017-08-17 ASSESSMENT — PAIN SCALES - GENERAL: PAINLEVEL_OUTOF10: 7

## 2017-08-17 ASSESSMENT — PAIN DESCRIPTION - FREQUENCY: FREQUENCY: INTERMITTENT

## 2017-08-18 ENCOUNTER — TELEPHONE (OUTPATIENT)
Dept: PRIMARY CARE CLINIC | Age: 62
End: 2017-08-18

## 2017-08-18 ENCOUNTER — TELEPHONE (OUTPATIENT)
Dept: PAIN MANAGEMENT | Age: 62
End: 2017-08-18

## 2017-08-24 ENCOUNTER — HOSPITAL ENCOUNTER (OUTPATIENT)
Dept: WOUND CARE | Age: 62
Discharge: HOME | End: 2017-08-24
Attending: SURGERY | Admitting: SURGERY

## 2017-08-24 VITALS
RESPIRATION RATE: 16 BRPM | DIASTOLIC BLOOD PRESSURE: 77 MMHG | SYSTOLIC BLOOD PRESSURE: 150 MMHG | HEART RATE: 60 BPM | TEMPERATURE: 98.4 F

## 2017-08-24 DIAGNOSIS — L02.214 ABSCESS OF GROIN, RIGHT: Primary | ICD-10-CM

## 2017-08-24 PROCEDURE — 99212 OFFICE O/P EST SF 10 MIN: CPT | Performed by: SURGERY

## 2017-09-08 DIAGNOSIS — G89.4 CHRONIC PAIN SYNDROME: ICD-10-CM

## 2017-09-08 DIAGNOSIS — I10 ESSENTIAL HYPERTENSION: ICD-10-CM

## 2017-09-08 DIAGNOSIS — I25.10 CORONARY ARTERY DISEASE INVOLVING NATIVE CORONARY ARTERY OF NATIVE HEART WITHOUT ANGINA PECTORIS: ICD-10-CM

## 2017-09-09 ENCOUNTER — HOSPITAL ENCOUNTER (OUTPATIENT)
Dept: OTHER | Age: 62
Discharge: HOME | End: 2017-09-09
Attending: INTERNAL MEDICINE | Admitting: INTERNAL MEDICINE

## 2017-09-09 DIAGNOSIS — M79.7 FIBROMYALGIA: ICD-10-CM

## 2017-09-09 DIAGNOSIS — G89.4 CHRONIC PAIN SYNDROME: ICD-10-CM

## 2017-09-09 DIAGNOSIS — R52 PAIN: ICD-10-CM

## 2017-09-11 RX ORDER — OXCARBAZEPINE 600 MG/1
TABLET, FILM COATED ORAL
Qty: 60 TABLET | Refills: 0 | Status: SHIPPED | OUTPATIENT
Start: 2017-09-11 | End: 2017-09-14 | Stop reason: SDUPTHER

## 2017-09-11 RX ORDER — DULOXETIN HYDROCHLORIDE 30 MG/1
CAPSULE, DELAYED RELEASE ORAL
Qty: 60 CAPSULE | Refills: 0 | Status: SHIPPED | OUTPATIENT
Start: 2017-09-11 | End: 2017-09-14 | Stop reason: SDUPTHER

## 2017-09-11 RX ORDER — QUETIAPINE FUMARATE 25 MG/1
TABLET, FILM COATED ORAL
Qty: 90 TABLET | Refills: 0 | Status: SHIPPED | OUTPATIENT
Start: 2017-09-11 | End: 2017-09-14

## 2017-09-13 RX ORDER — RANOLAZINE 500 MG/1
TABLET, FILM COATED, EXTENDED RELEASE ORAL
Qty: 180 TABLET | Refills: 0 | Status: ON HOLD | OUTPATIENT
Start: 2017-09-13 | End: 2017-11-25 | Stop reason: SDUPTHER

## 2017-09-13 RX ORDER — CHLORTHALIDONE 25 MG/1
TABLET ORAL
Qty: 90 TABLET | Refills: 2 | Status: SHIPPED | OUTPATIENT
Start: 2017-09-13 | End: 2017-11-07

## 2017-09-13 RX ORDER — CLOPIDOGREL BISULFATE 75 MG/1
TABLET ORAL
Qty: 90 TABLET | Refills: 0 | Status: SHIPPED | OUTPATIENT
Start: 2017-09-13 | End: 2017-10-05 | Stop reason: SDUPTHER

## 2017-09-14 ENCOUNTER — OFFICE VISIT (OUTPATIENT)
Dept: PAIN MANAGEMENT | Age: 62
End: 2017-09-14

## 2017-09-14 VITALS — SYSTOLIC BLOOD PRESSURE: 154 MMHG | DIASTOLIC BLOOD PRESSURE: 71 MMHG | HEART RATE: 55 BPM

## 2017-09-14 DIAGNOSIS — F51.01 PRIMARY INSOMNIA: ICD-10-CM

## 2017-09-14 DIAGNOSIS — G43.119 INTRACTABLE MIGRAINE WITH AURA WITHOUT STATUS MIGRAINOSUS: ICD-10-CM

## 2017-09-14 DIAGNOSIS — M31.6 GIANT CELL ARTERITIS (HCC): ICD-10-CM

## 2017-09-14 DIAGNOSIS — F33.41 RECURRENT MAJOR DEPRESSIVE DISORDER, IN PARTIAL REMISSION (HCC): ICD-10-CM

## 2017-09-14 DIAGNOSIS — M79.7 FIBROMYALGIA: ICD-10-CM

## 2017-09-14 DIAGNOSIS — G89.4 CHRONIC PAIN SYNDROME: ICD-10-CM

## 2017-09-14 PROCEDURE — 99214 OFFICE O/P EST MOD 30 MIN: CPT | Performed by: INTERNAL MEDICINE

## 2017-09-14 RX ORDER — HYDROCODONE BITARTRATE AND ACETAMINOPHEN 7.5; 325 MG/1; MG/1
1 TABLET ORAL EVERY 6 HOURS PRN
Qty: 84 TABLET | Refills: 0 | Status: SHIPPED | OUTPATIENT
Start: 2017-09-14 | End: 2017-10-12 | Stop reason: SDUPTHER

## 2017-09-14 RX ORDER — DULOXETIN HYDROCHLORIDE 30 MG/1
CAPSULE, DELAYED RELEASE ORAL
Qty: 60 CAPSULE | Refills: 0 | Status: SHIPPED | OUTPATIENT
Start: 2017-09-14 | End: 2017-10-12 | Stop reason: SDUPTHER

## 2017-09-14 RX ORDER — OXCARBAZEPINE 600 MG/1
TABLET, FILM COATED ORAL
Qty: 60 TABLET | Refills: 0 | Status: SHIPPED | OUTPATIENT
Start: 2017-09-14 | End: 2017-10-12 | Stop reason: SDUPTHER

## 2017-09-14 RX ORDER — ONDANSETRON 4 MG/1
TABLET, FILM COATED ORAL
Qty: 30 TABLET | Refills: 5 | Status: SHIPPED | OUTPATIENT
Start: 2017-09-14 | End: 2017-12-19 | Stop reason: SDUPTHER

## 2017-09-14 RX ORDER — TRAZODONE HYDROCHLORIDE 50 MG/1
50 TABLET ORAL NIGHTLY
Qty: 60 TABLET | Refills: 0 | Status: SHIPPED | OUTPATIENT
Start: 2017-09-14 | End: 2017-10-05 | Stop reason: ALTCHOICE

## 2017-09-20 ENCOUNTER — OFFICE VISIT (OUTPATIENT)
Dept: PRIMARY CARE CLINIC | Age: 62
End: 2017-09-20

## 2017-09-20 VITALS
SYSTOLIC BLOOD PRESSURE: 132 MMHG | WEIGHT: 124 LBS | OXYGEN SATURATION: 99 % | RESPIRATION RATE: 16 BRPM | HEIGHT: 60 IN | TEMPERATURE: 98.2 F | DIASTOLIC BLOOD PRESSURE: 69 MMHG | BODY MASS INDEX: 24.35 KG/M2 | HEART RATE: 60 BPM

## 2017-09-20 DIAGNOSIS — I10 ESSENTIAL HYPERTENSION: ICD-10-CM

## 2017-09-20 DIAGNOSIS — I25.10 CORONARY ARTERY DISEASE INVOLVING NATIVE CORONARY ARTERY OF NATIVE HEART WITHOUT ANGINA PECTORIS: ICD-10-CM

## 2017-09-20 DIAGNOSIS — Z09 HOSPITAL DISCHARGE FOLLOW-UP: Primary | ICD-10-CM

## 2017-09-20 DIAGNOSIS — K21.9 GASTROESOPHAGEAL REFLUX DISEASE, ESOPHAGITIS PRESENCE NOT SPECIFIED: ICD-10-CM

## 2017-09-20 DIAGNOSIS — M31.6 TEMPORAL ARTERITIS (HCC): ICD-10-CM

## 2017-09-20 PROCEDURE — 99213 OFFICE O/P EST LOW 20 MIN: CPT | Performed by: NURSE PRACTITIONER

## 2017-09-20 RX ORDER — PANTOPRAZOLE SODIUM 40 MG/1
40 TABLET, DELAYED RELEASE ORAL DAILY
Qty: 30 TABLET | Refills: 3 | Status: SHIPPED | OUTPATIENT
Start: 2017-09-20 | End: 2017-12-19

## 2017-09-20 RX ORDER — PREDNISONE 10 MG/1
TABLET ORAL
Qty: 70 TABLET | Refills: 0 | Status: SHIPPED | OUTPATIENT
Start: 2017-09-20 | End: 2017-10-12

## 2017-09-20 ASSESSMENT — ENCOUNTER SYMPTOMS
NAUSEA: 0
EYE PAIN: 0
CHEST TIGHTNESS: 0
PHOTOPHOBIA: 0
SHORTNESS OF BREATH: 0
ABDOMINAL DISTENTION: 0
SORE THROAT: 0
EYES NEGATIVE: 1
WHEEZING: 0
ABDOMINAL PAIN: 1
COUGH: 0
COLOR CHANGE: 0
EYE REDNESS: 0
SINUS PRESSURE: 0

## 2017-09-26 ENCOUNTER — TELEPHONE (OUTPATIENT)
Dept: PAIN MANAGEMENT | Age: 62
End: 2017-09-26

## 2017-10-05 ENCOUNTER — OFFICE VISIT (OUTPATIENT)
Dept: CARDIOLOGY CLINIC | Age: 62
End: 2017-10-05

## 2017-10-05 ENCOUNTER — TELEPHONE (OUTPATIENT)
Dept: CARDIOLOGY CLINIC | Age: 62
End: 2017-10-05

## 2017-10-05 VITALS
WEIGHT: 125 LBS | HEART RATE: 72 BPM | HEIGHT: 60 IN | BODY MASS INDEX: 24.54 KG/M2 | OXYGEN SATURATION: 98 % | DIASTOLIC BLOOD PRESSURE: 70 MMHG | SYSTOLIC BLOOD PRESSURE: 132 MMHG

## 2017-10-05 DIAGNOSIS — J44.9 CHRONIC OBSTRUCTIVE PULMONARY DISEASE, UNSPECIFIED COPD TYPE (HCC): ICD-10-CM

## 2017-10-05 DIAGNOSIS — R07.9 CHEST PAIN, UNSPECIFIED TYPE: Primary | ICD-10-CM

## 2017-10-05 DIAGNOSIS — I48.0 PAF (PAROXYSMAL ATRIAL FIBRILLATION) (HCC): ICD-10-CM

## 2017-10-05 DIAGNOSIS — I50.32 CHRONIC DIASTOLIC HEART FAILURE (HCC): ICD-10-CM

## 2017-10-05 DIAGNOSIS — E78.2 MIXED HYPERLIPIDEMIA: ICD-10-CM

## 2017-10-05 DIAGNOSIS — I25.119 CORONARY ARTERY DISEASE INVOLVING NATIVE CORONARY ARTERY OF NATIVE HEART WITH ANGINA PECTORIS (HCC): ICD-10-CM

## 2017-10-05 DIAGNOSIS — I10 ESSENTIAL HYPERTENSION: ICD-10-CM

## 2017-10-05 DIAGNOSIS — R00.2 PALPITATIONS: ICD-10-CM

## 2017-10-05 DIAGNOSIS — M79.7 FIBROMYALGIA: ICD-10-CM

## 2017-10-05 DIAGNOSIS — R00.2 PALPITATION: ICD-10-CM

## 2017-10-05 PROCEDURE — 99214 OFFICE O/P EST MOD 30 MIN: CPT | Performed by: NURSE PRACTITIONER

## 2017-10-05 PROCEDURE — 93000 ELECTROCARDIOGRAM COMPLETE: CPT | Performed by: NURSE PRACTITIONER

## 2017-10-05 NOTE — MR AVS SNAPSHOT
After Visit Summary             Orlin Kapoor   10/5/2017 2:00 PM   Office Visit    Description:  Female : 1955   Provider:  Claudia Calderon CNP   Department:  [facility]              Your Follow-Up and Future Appointments         Below is a list of your follow-up and future appointments. This may not be a complete list as you may have made appointments directly with providers that we are not aware of or your providers may have made some for you. Please call your providers to confirm appointments. It is important to keep your appointments. Please bring your current insurance card, photo ID, co-pay, and all medication bottles to your appointment. If self-pay, payment is expected at the time of service. Your To-Do List     Future Appointments Provider Department Dept Phone    10/12/2017 8:30 AM Mariza Garcia MD [facility] [phone]    Please arrive 15 minutes prior to appointment time, bring insurance card and photo ID.     10/18/2017 11:00 AM [place]  [address] [facility] [phone]    Please arrive 15 minutes prior to appointment, bring photo ID and insurance card. Follow-Up    Return in about 4 months (around 2018) for with Dr. Concetta Azar. Information from Your Visit        Department     Name Address Phone Fax    [facility]   [place] [place] [address] [phone]      You Were Seen for:         Comments    Chest pain, unspecified type   [[id number]]         Vital Signs     Blood Pressure Pulse Height Weight Oxygen Saturation Body Mass Index    132/70 72 5' (1.524 m) 125 lb (56.7 kg) 98% 24.41 kg/m2    Smoking Status                   Former Smoker           Instructions    Continue same medications            Medications and Orders      Your Current Medications Are              pantoprazole (PROTONIX) 40 MG tablet Take 1 tablet by mouth daily Acute on chronic diastolic heart failure (HCC)    Acute respiratory failure with hypoxia and hypercarbia (HCC)    Benign essential tremor    Tobacco use    Port-a-cath in place    Inferior vena cava occlusion (HCC)    Chest pain    Dysarthria    Migraine with aura, intractable    Hemisensory loss    PTSD (post-traumatic stress disorder)    Insomnia    Snoring    COPD (chronic obstructive pulmonary disease) (HCC)    Asthma    Depression    PVC (premature ventricular contraction)    CAD (coronary artery disease)    Palpitation      Your Goals as of 10/5/2017 at 3:07 PM                 Lifestyle    s/s COPD under control for 2 months     recognize s/s requiring MD visit       Immunizations as of 10/5/2017     Name Date    Influenza Vaccine, unspecified formulation 10/8/2010, 11/17/2009    Influenza Virus Vaccine 9/30/2014, 12/11/2013    Influenza, Intradermal, Preservative free 10/4/2012    Influenza, Laverne London, 3 yrs and older, IM, Preservative Free 9/28/2016    Pneumococcal Polysaccharide ([id number]) 12/12/2013    Tdap (Boostrix, Adacel) 10/28/2016, 11/17/2009      Preventive Care        Date Due    HIV screening is recommended for all people regardless of risk factors  aged 15-65 years at least once (lifetime) who have never been HIV tested. 10/25/1971    Zoster Vaccine 10/25/2016    Yearly Flu Vaccine (1) 9/1/2017    Hemoglobin A1C (Test For Long-Term Glucose Control) 10/13/2017    Eye Exam By An Eye Doctor 1/11/2018    Mammograms are recommended every 2 years for low/average risk patients aged 48 - 69, and every year for high risk patients per updated national guidelines. However these guidelines can be individualized by your provider.  1/14/2018    Cholesterol Screening 3/15/2018    Diabetic Foot Exam 4/26/2018    Urine Check For Kidney Problems 7/13/2018    Pap Smear 12/29/2018    Colonoscopy 7/15/2023    Tetanus Combination Vaccine (3 - Td) 10/28/2026            [facility] Our records indicate that you have an active [employer] account. You can view your After Visit Summary by going to [email or web address] and logging in with your [employer] username and password. If you don't have a [employer] username and password but a parent or guardian has access to your record, the parent or guardian should login with their own [employer] username and password and access your record to view the After Visit Summary. Additional Information  If you have questions, please contact the physician practice where you receive care. Remember, [employer] is NOT to be used for urgent needs. For medical emergencies, dial 911. For questions regarding your [employer] account call [phone]. If you have a clinical question, please call your doctor's office.

## 2017-10-05 NOTE — PROGRESS NOTES
[address]  Office Visit    Dalila Valentin  1955    October 5, 2017    CC:   Chief Complaint   Patient presents with    Hypertension     BP elevated.  Chest Pain     Daily CP using Nitro 1-2 per day with relief. With and without exertion. Radiates to back and left arm. HPI:  The patient is 62 y.o. female with a past medical history significant for CAD/stents, DHF, HTN, HLD, anxiety, fibromyalgia and chronic pain syndrome who is here for hospital follow up. Hospitalized from 9/6/2017-9/7/2017 with atypical chest pain after a fall. Continues to complain of chest pain today and taking NTG 2-3 tabs on a daily basis. S/P DEANGELO to RCA in 6/2016. Overall not feeling well. Has frequent episodes of chest pressure; midsternal to L inframammary, occurs at rest and with activity, no positional or pleuritic component, feels it occurs with heart beating fast and then pauses and returns to a normal beat; lasts few minutes and has to take NTG on occasion. This week with increased episode requiring NTG. Has had 2 ablations in the past (Dr. Christina Michael). Feels like the sxs are coming back. Chest pain different from what she presented to in the past (prior to stent). Episodes associated with fatigue. Denies SOB, orthopnea/PND, cough, dizziness, syncope, edema , weight change or claudication. Remains off cigarettes since 2015. Review of Systems:  Constitutional: Denies  weakness, night sweats or fever. + fatigue  HEENT: Denies new visual changes, ringing in ears, nosebleeds, nasal congestion  Respiratory: Denies new or change in SOB, PND, orthopnea or cough. Cardiovascular: see HPI  GI: Denies  diarrhea, constipation, abdominal pain, change in bowel habits, melena or hematochezia   + frequent nausea  : Denies urinary frequency, urgency, incontinence, hematuria or dysuria.   Skin: Denies rash, hives, or cyanosis  Musculoskeletal: + generalized mylagias  Neurological: Denies syncope or TIA-like symptoms. Psychiatric: Denies anxiety, insomnia or depression     Past Medical History:   Diagnosis Date    Acid reflux     Anemia     Anxiety     Arthritis     Asthma     Atrial fibrillation (HCC)     Blood transfusion reaction     CAD (coronary artery disease) 12/3/2012    CHF (congestive heart failure) (HCC)     Chronic kidney disease     40% kidney functiom    COPD (chronic obstructive pulmonary disease) (HCC)     Depression     Dysarthria     Fibromyalgia 6/7/2016    Headache 2/19/2013    Heart disease     Hemisensory loss     Hyperlipidemia     Hypertension     IBS (irritable bowel syndrome)     Inferior vena cava occlusion (HCC)     Irritable bowel syndrome     Keratitis     Neuromuscular disorder (HCC)     fibromyalgia    Neuropathy (HCC)     Superior vena cava obstruction     Temporal arteritis ([facility] [address] 2/24/2014    Type II or unspecified type diabetes mellitus without mention of complication, not stated as uncontrolled      Past Surgical History:   Procedure Laterality Date    ABLATION OF DYSRHYTHMIC FOCUS      ARTERY BIOPSY Right 04/23/2014    RIGHT TEMPORAL ARTERY BIOPSY    CATARACT REMOVAL Bilateral     CHOLECYSTECTOMY      CORONARY ANGIOPLASTY WITH STENT PLACEMENT      CORONARY ANGIOPLASTY WITH STENT PLACEMENT      HYSTERECTOMY      JOINT REPLACEMENT Right     KNEE ARTHROSCOPY Right     KNEE SURGERY      right knee replacement    TUNNELED VENOUS PORT PLACEMENT      left thigh.      VASCULAR SURGERY       Family History   Problem Relation Age of Onset    Diabetes Mother     Hypertension Mother     High Cholesterol Mother     Stroke Mother     Cancer Mother      Social History   Substance Use Topics    Smoking status: Former Smoker     Packs/day: 0.50     Years: 35.00     Types: Cigarettes     Quit date: 4/26/2015    Smokeless tobacco: Never Used      Comment: 5/13/15 has not smoked since hospitalization - kh    Alcohol use No       No Known (NORVASC) 10 MG tablet Take 1 tablet by mouth daily 30 tablet 5    torsemide (DEMADEX) 20 MG tablet Take 1 tablet by mouth daily 30 tablet 5    metoprolol succinate (TOPROL XL) 25 MG extended release tablet Take 0.5 tablets by mouth 2 times daily 60 tablet 5    isosorbide mononitrate (IMDUR) 30 MG extended release tablet Take 1 tablet by mouth daily 30 tablet 5    budesonide-formoterol (SYMBICORT) 160-4.5 MCG/ACT AERO Inhale 2 puffs into the lungs daily 1 Inhaler 5    albuterol sulfate HFA (PROAIR HFA) 108 (90 BASE) MCG/ACT inhaler Inhale 2 puffs into the lungs every 6 hours as needed for Wheezing 1 Inhaler 3    ranolazine (RANEXA) 500 MG extended release tablet Take 1 tablet by mouth 2 times daily 180 tablet 1    clopidogrel (PLAVIX) 75 MG tablet Take 1 tablet by mouth daily 90 tablet 1     No current facility-administered medications for this visit. Physical Exam:   /70  Pulse 72  Ht 5' (1.524 m)  Wt 125 lb (56.7 kg)  SpO2 98%  BMI 24.41 kg/m2  Wt Readings from Last 2 Encounters:   10/05/17 125 lb (56.7 kg)   09/20/17 124 lb (56.2 kg)     Constitutional: She is oriented to person, place, and time. She appears well-developed and well-nourished. In no acute distress. HEENT: Normocephalic and atraumatic. Sclerae anicteric. No xanthelasmas. EOM's intact. Neck: Neck supple. No JVD present. Carotids without bruits. No mass and no thyromegaly present. No lymphadenopathy present. Cardiovascular: RRR, normal S1 and S2; soft systolic murmur. PMI nondisplaced  Pulmonary/Chest: Effort normal and breath sounds normal.  Lungs clear to auscultation. Chest wall nontender  Abdominal: soft, nontender, nondistended. + bowel sounds; no organomegaly or bruits. Aorta normal  Extremities: No edema, cyanosis, or clubbing. Pulses are 2+ radial/carotid/dorsalis pedis bilaterally. Cap refill brisk. Cap refill brisk  Neurological: No focal deficit. Skin: Skin is warm and dry.    Psychiatric: She has a normal

## 2017-10-10 RX ORDER — NITROGLYCERIN 0.4 MG/1
TABLET SUBLINGUAL
Qty: 25 TABLET | Refills: 2 | Status: ON HOLD | OUTPATIENT
Start: 2017-10-10 | End: 2018-10-31

## 2017-10-12 ENCOUNTER — OFFICE VISIT (OUTPATIENT)
Dept: PAIN MANAGEMENT | Age: 62
End: 2017-10-12

## 2017-10-12 VITALS
WEIGHT: 125 LBS | DIASTOLIC BLOOD PRESSURE: 61 MMHG | HEART RATE: 68 BPM | SYSTOLIC BLOOD PRESSURE: 117 MMHG | BODY MASS INDEX: 24.41 KG/M2

## 2017-10-12 DIAGNOSIS — G89.4 CHRONIC PAIN SYNDROME: ICD-10-CM

## 2017-10-12 DIAGNOSIS — M75.81 ROTATOR CUFF TENDONITIS, RIGHT: ICD-10-CM

## 2017-10-12 DIAGNOSIS — M79.7 FIBROMYALGIA: ICD-10-CM

## 2017-10-12 PROCEDURE — 99213 OFFICE O/P EST LOW 20 MIN: CPT | Performed by: INTERNAL MEDICINE

## 2017-10-12 RX ORDER — DULOXETIN HYDROCHLORIDE 30 MG/1
CAPSULE, DELAYED RELEASE ORAL
Qty: 60 CAPSULE | Refills: 0 | Status: SHIPPED | OUTPATIENT
Start: 2017-10-12 | End: 2017-11-09 | Stop reason: SDUPTHER

## 2017-10-12 RX ORDER — HYDROCODONE BITARTRATE AND ACETAMINOPHEN 7.5; 325 MG/1; MG/1
1 TABLET ORAL EVERY 6 HOURS PRN
Qty: 84 TABLET | Refills: 0 | Status: SHIPPED | OUTPATIENT
Start: 2017-10-12 | End: 2017-11-09 | Stop reason: SDUPTHER

## 2017-10-12 RX ORDER — OXCARBAZEPINE 600 MG/1
TABLET, FILM COATED ORAL
Qty: 60 TABLET | Refills: 0 | Status: SHIPPED | OUTPATIENT
Start: 2017-10-12 | End: 2017-11-09 | Stop reason: SDUPTHER

## 2017-10-12 NOTE — PROGRESS NOTES
rales.     Neurological/Psychiatric:She is alert and oriented to person, place, and time. Coordination is  normal. Her mood isAppropriate and affect is Flat/blunted . IMPRESSION:   1. Fibromyalgia    2. Chronic pain syndrome    3. Rotator cuff tendonitis, right        PLAN:  Informed verbal consent was obtained  -[place] record was obtained and reviewed  for the last one year and no indicators of drug misuse  were found. Any other controlled substance prescriptions  seen on the record have been accounted for, I am aware of the patient receiving these medications. Sarah Chavez [place] record will be rechecked as part of office protocol.   -Records from Cardiology reviewed   -Will continue with current regimen   -Walking as tolerated   -No change in medication regimen   -Start swimming/stretching exercises   -Adv Biofeedback, relaxation and meditation techniques. Referral to psychologist for CBT was also discussed with patient    Current Outpatient Prescriptions   Medication Sig Dispense Refill    nitroGLYCERIN (NITROSTAT) 0.4 MG SL tablet PLACE 1 TABLET UNDER THE TONGUE EVERY 5 MINUTES ASNEEDED FOR CHEST PAIN. 25 tablet 2    pantoprazole (PROTONIX) 40 MG tablet Take 1 tablet by mouth daily 30 tablet 3    insulin glargine (LANTUS SOLOSTAR) 100 UNIT/ML injection pen Use 35 units in am and 30 units at night 6 Pen 5    ondansetron (ZOFRAN) 4 MG tablet TAKE ONE TABLET BY MOUTH EVERY EIGHT HOURS AS     NEEDED FOR NAUSEA OR     VOMITING. 30 tablet 5    HYDROcodone-acetaminophen (NORCO) 7.5-325 MG per tablet Take 1 tablet by mouth every 6 hours as needed for Pain (max 2-3 per day) .  84 tablet 0    DULoxetine (CYMBALTA) 30 MG extended release capsule TAKE ONE CAPSULE BY MOUTHTWICE A DAY 60 capsule 0    OXcarbazepine (TRILEPTAL) 600 MG tablet TAKE 1 TABLET BY MOUTH 2 TIMES DAILY 60 tablet 0    chlorthalidone (HYGROTON) 25 MG tablet TAKE ONE TABLET BY MOUTH DAILY 90 tablet 2    RANEXA 500 MG extended release tablet TAKE 1 TABLET including no treatment were discussed with the patient. The common side effects of these medications were also explained to the patient. Informed verbal consent was obtained. Goals of current treatment regimen include improvement in pain, restoration of functioning- with focus on improvement in physical performance, general activity, work or disability,emotional distress, health care utilization and  decreased medication consumption. Will continue to monitor progress towards achieving/maintaining therapeutic goals with special emphasis on  1. Improvement in perceived interfernce  of pain with ADL's. Ability to do home exercises independently. Ability to do household chores indoor and/or outdoor work and social and leisure activities. Improve psychosocial and physical functioning. - she is showing progression towards this treatment goal with the current regimen. She was advised against drinking alcohol with the narcotic pain medicines, advised against driving or handling machinery while adjusting the dose of medicines or if having cognitive  issues related to the current medications. Risk of overdose and death, if medicines not taken as prescribed, were also discussed. If the patient develops new symptoms or if the symptoms worsen, the patient should call the office. While transcribing every attempt was made to maintain the accuracy of the note in terms of it's contents,there may have been some errors made inadvertently. Thank you for allowing me to participate in the care of this patient. Domingo Ledezma MD.    Cc: Dagoberto Lopez MD    I, Reginaldo Burk, am scribing for and in the presence of Dr. Domingo Ledezma.    10/12/17  9:38 AM  RACHEL Krause, Dr. Domingo Ledezma, personally performed the services described in this documentation as scribed by   Reginaldo Burk MA in my presence and it is both accurate and complete

## 2017-10-30 ENCOUNTER — TELEPHONE (OUTPATIENT)
Dept: PRIMARY CARE CLINIC | Age: 62
End: 2017-10-30

## 2017-10-30 NOTE — TELEPHONE ENCOUNTER
[address] said pt is requesting lidocaine ointment and insulin testing supplies. This has been faxed to us several times.       FAX:  [phone]    Reference #[id number]

## 2017-10-31 RX ORDER — ONDANSETRON 4 MG/1
4 TABLET, FILM COATED ORAL EVERY 8 HOURS PRN
Qty: 30 TABLET | Refills: 3 | Status: ON HOLD | OUTPATIENT
Start: 2017-10-31 | End: 2017-11-25 | Stop reason: SDUPTHER

## 2017-11-06 PROCEDURE — 93272 ECG/REVIEW INTERPRET ONLY: CPT | Performed by: NURSE PRACTITIONER

## 2017-11-07 ENCOUNTER — OFFICE VISIT (OUTPATIENT)
Dept: PRIMARY CARE CLINIC | Age: 62
End: 2017-11-07

## 2017-11-07 VITALS
WEIGHT: 127 LBS | OXYGEN SATURATION: 100 % | TEMPERATURE: 98.2 F | SYSTOLIC BLOOD PRESSURE: 135 MMHG | HEART RATE: 61 BPM | BODY MASS INDEX: 24.94 KG/M2 | DIASTOLIC BLOOD PRESSURE: 75 MMHG | HEIGHT: 60 IN

## 2017-11-07 DIAGNOSIS — Z23 FLU VACCINE NEED: ICD-10-CM

## 2017-11-07 DIAGNOSIS — J44.9 CHRONIC OBSTRUCTIVE PULMONARY DISEASE, UNSPECIFIED COPD TYPE (HCC): ICD-10-CM

## 2017-11-07 DIAGNOSIS — I25.10 CORONARY ARTERY DISEASE INVOLVING NATIVE CORONARY ARTERY OF NATIVE HEART WITHOUT ANGINA PECTORIS: ICD-10-CM

## 2017-11-07 DIAGNOSIS — I10 ESSENTIAL HYPERTENSION: ICD-10-CM

## 2017-11-07 LAB — HBA1C MFR BLD: 10.3 %

## 2017-11-07 PROCEDURE — G0008 ADMIN INFLUENZA VIRUS VAC: HCPCS | Performed by: INTERNAL MEDICINE

## 2017-11-07 PROCEDURE — G8598 ASA/ANTIPLAT THER USED: HCPCS | Performed by: INTERNAL MEDICINE

## 2017-11-07 PROCEDURE — 3023F SPIROM DOC REV: CPT | Performed by: INTERNAL MEDICINE

## 2017-11-07 PROCEDURE — G8484 FLU IMMUNIZE NO ADMIN: HCPCS | Performed by: INTERNAL MEDICINE

## 2017-11-07 PROCEDURE — G8926 SPIRO NO PERF OR DOC: HCPCS | Performed by: INTERNAL MEDICINE

## 2017-11-07 PROCEDURE — 83036 HEMOGLOBIN GLYCOSYLATED A1C: CPT | Performed by: INTERNAL MEDICINE

## 2017-11-07 PROCEDURE — 3046F HEMOGLOBIN A1C LEVEL >9.0%: CPT | Performed by: INTERNAL MEDICINE

## 2017-11-07 PROCEDURE — G8427 DOCREV CUR MEDS BY ELIG CLIN: HCPCS | Performed by: INTERNAL MEDICINE

## 2017-11-07 PROCEDURE — 90630 INFLUENZA, QUADV, 18-64 YRS, ID, PF, MICRO INJ, 0.1ML (FLUZONE QUADV, PF): CPT | Performed by: INTERNAL MEDICINE

## 2017-11-07 PROCEDURE — G8420 CALC BMI NORM PARAMETERS: HCPCS | Performed by: INTERNAL MEDICINE

## 2017-11-07 PROCEDURE — 3017F COLORECTAL CA SCREEN DOC REV: CPT | Performed by: INTERNAL MEDICINE

## 2017-11-07 PROCEDURE — 1036F TOBACCO NON-USER: CPT | Performed by: INTERNAL MEDICINE

## 2017-11-07 PROCEDURE — 3014F SCREEN MAMMO DOC REV: CPT | Performed by: INTERNAL MEDICINE

## 2017-11-07 PROCEDURE — 99214 OFFICE O/P EST MOD 30 MIN: CPT | Performed by: INTERNAL MEDICINE

## 2017-11-07 RX ORDER — TORSEMIDE 20 MG/1
20 TABLET ORAL DAILY
Qty: 30 TABLET | Refills: 5 | Status: ON HOLD | OUTPATIENT
Start: 2017-11-07 | End: 2017-11-29 | Stop reason: HOSPADM

## 2017-11-07 RX ORDER — BUDESONIDE AND FORMOTEROL FUMARATE DIHYDRATE 160; 4.5 UG/1; UG/1
2 AEROSOL RESPIRATORY (INHALATION) DAILY
Qty: 1 INHALER | Refills: 5 | Status: SHIPPED | OUTPATIENT
Start: 2017-11-07 | End: 2018-05-10 | Stop reason: SDUPTHER

## 2017-11-07 RX ORDER — METOPROLOL SUCCINATE 25 MG/1
12.5 TABLET, EXTENDED RELEASE ORAL 2 TIMES DAILY
Qty: 60 TABLET | Refills: 5 | Status: SHIPPED | OUTPATIENT
Start: 2017-11-07 | End: 2018-02-07 | Stop reason: SDUPTHER

## 2017-11-07 RX ORDER — ALBUTEROL SULFATE 90 UG/1
2 AEROSOL, METERED RESPIRATORY (INHALATION) EVERY 6 HOURS PRN
Qty: 1 INHALER | Refills: 5 | Status: SHIPPED | OUTPATIENT
Start: 2017-11-07 | End: 2018-11-07 | Stop reason: SDUPTHER

## 2017-11-07 RX ORDER — AMLODIPINE BESYLATE 10 MG/1
10 TABLET ORAL DAILY
Qty: 30 TABLET | Refills: 5 | Status: SHIPPED | OUTPATIENT
Start: 2017-11-07 | End: 2018-02-07 | Stop reason: SDUPTHER

## 2017-11-07 RX ORDER — ISOSORBIDE MONONITRATE 30 MG/1
30 TABLET, EXTENDED RELEASE ORAL DAILY
Qty: 30 TABLET | Refills: 5 | Status: SHIPPED | OUTPATIENT
Start: 2017-11-07 | End: 2018-01-09 | Stop reason: SINTOL

## 2017-11-07 ASSESSMENT — ENCOUNTER SYMPTOMS
VISUAL CHANGE: 0
ABDOMINAL PAIN: 0
EYE REDNESS: 0
EYES NEGATIVE: 1
PHOTOPHOBIA: 0
NAUSEA: 0
SORE THROAT: 0
BACK PAIN: 1
ABDOMINAL DISTENTION: 0
GASTROINTESTINAL NEGATIVE: 1
SINUS PRESSURE: 0
CHEST TIGHTNESS: 0
EYE PAIN: 0

## 2017-11-07 NOTE — PROGRESS NOTES
Subjective:      Patient ID: Trinity Garcia is a 65 y.o. female. 11/7/17        Last seen  8/8/17 Patient presents with:  Pain: c/o boil right side of groin area for 4 days. not Happy at Pain Management! Diabetes   There are no hypoglycemic associated symptoms. Pertinent negatives for diabetes include no visual change. Hypertension         Review of Systems   Constitutional: Negative for fever and unexpected weight change. Flu vac 11/17    tdap 10/16     pneumovac 10/13         HENT: Negative for sinus pressure and sore throat. Dry mouth    Eyes: Negative. Negative for photophobia, pain and redness. Last Eye Ex 2016   Respiratory: Negative for chest tightness. Stopped smoking for now   Denies Etoh . No rec drugs . Cardiovascular:        Known CAD with Stents . New stents x 2 ;4/15       Gastrointestinal: Negative. Negative for abdominal distention, abdominal pain and nausea. No Fh of ca colon . Colonoscopy recent   Genitourinary: Negative for dysuria. Hysterectomy   Mammogram 1/16   Musculoskeletal: Positive for arthralgias and back pain. Pain Disorder c/o Pain Management   Skin: Negative for rash. Neurological: Negative for numbness. Hematological:        Did see Hematology for anemia . Bone Marrow exam Nl . Psychiatric/Behavioral: Positive for sleep disturbance. Objective:   Physical Exam   Constitutional: She is oriented to person, place, and time. She appears well-nourished. No distress. Eyes: EOM are normal.   Neck: Neck supple. Cardiovascular: Regular rhythm and normal heart sounds. Pulmonary/Chest: Breath sounds normal.   Reduced BS   Abdominal: She exhibits distension. There is no tenderness. Musculoskeletal:   Foot exam 4/17      Neurological: She is alert and oriented to person, place, and time. She has normal strength. Vitals reviewed.       Assessment:   Maren was seen today for diabetes, hypertension and

## 2017-11-08 ENCOUNTER — TELEPHONE (OUTPATIENT)
Dept: PRIMARY CARE CLINIC | Age: 62
End: 2017-11-08

## 2017-11-08 DIAGNOSIS — R11.0 NAUSEA: ICD-10-CM

## 2017-11-08 DIAGNOSIS — R14.0 ABDOMINAL BLOATING: ICD-10-CM

## 2017-11-08 DIAGNOSIS — E11.9 TYPE 2 DIABETES MELLITUS WITHOUT COMPLICATION, WITHOUT LONG-TERM CURRENT USE OF INSULIN (HCC): ICD-10-CM

## 2017-11-08 DIAGNOSIS — R19.7 DIARRHEA, UNSPECIFIED TYPE: ICD-10-CM

## 2017-11-08 DIAGNOSIS — M31.6 TEMPORAL ARTERITIS (HCC): ICD-10-CM

## 2017-11-08 DIAGNOSIS — G89.4 CHRONIC PAIN SYNDROME: ICD-10-CM

## 2017-11-08 DIAGNOSIS — R10.10 PAIN OF UPPER ABDOMEN: ICD-10-CM

## 2017-11-09 ENCOUNTER — OFFICE VISIT (OUTPATIENT)
Dept: PAIN MANAGEMENT | Age: 62
End: 2017-11-09

## 2017-11-09 VITALS
SYSTOLIC BLOOD PRESSURE: 141 MMHG | HEART RATE: 63 BPM | BODY MASS INDEX: 24.8 KG/M2 | WEIGHT: 127 LBS | DIASTOLIC BLOOD PRESSURE: 68 MMHG

## 2017-11-09 DIAGNOSIS — M79.7 FIBROMYALGIA: ICD-10-CM

## 2017-11-09 DIAGNOSIS — G89.4 CHRONIC PAIN SYNDROME: ICD-10-CM

## 2017-11-09 DIAGNOSIS — M75.81 ROTATOR CUFF TENDONITIS, RIGHT: ICD-10-CM

## 2017-11-09 PROCEDURE — G8420 CALC BMI NORM PARAMETERS: HCPCS | Performed by: INTERNAL MEDICINE

## 2017-11-09 PROCEDURE — 3014F SCREEN MAMMO DOC REV: CPT | Performed by: INTERNAL MEDICINE

## 2017-11-09 PROCEDURE — 99213 OFFICE O/P EST LOW 20 MIN: CPT | Performed by: INTERNAL MEDICINE

## 2017-11-09 PROCEDURE — G8427 DOCREV CUR MEDS BY ELIG CLIN: HCPCS | Performed by: INTERNAL MEDICINE

## 2017-11-09 PROCEDURE — G8598 ASA/ANTIPLAT THER USED: HCPCS | Performed by: INTERNAL MEDICINE

## 2017-11-09 PROCEDURE — G8484 FLU IMMUNIZE NO ADMIN: HCPCS | Performed by: INTERNAL MEDICINE

## 2017-11-09 PROCEDURE — 3017F COLORECTAL CA SCREEN DOC REV: CPT | Performed by: INTERNAL MEDICINE

## 2017-11-09 PROCEDURE — 1036F TOBACCO NON-USER: CPT | Performed by: INTERNAL MEDICINE

## 2017-11-09 RX ORDER — METHOCARBAMOL 500 MG/1
500 TABLET, FILM COATED ORAL 2 TIMES DAILY PRN
Qty: 60 TABLET | Refills: 0 | Status: SHIPPED | OUTPATIENT
Start: 2017-11-09 | End: 2017-12-07

## 2017-11-09 RX ORDER — DULOXETIN HYDROCHLORIDE 30 MG/1
CAPSULE, DELAYED RELEASE ORAL
Qty: 60 CAPSULE | Refills: 0 | Status: SHIPPED | OUTPATIENT
Start: 2017-11-09 | End: 2017-12-07 | Stop reason: SDUPTHER

## 2017-11-09 RX ORDER — OXCARBAZEPINE 600 MG/1
TABLET, FILM COATED ORAL
Qty: 60 TABLET | Refills: 0 | Status: SHIPPED | OUTPATIENT
Start: 2017-11-09 | End: 2017-12-07 | Stop reason: SDUPTHER

## 2017-11-09 RX ORDER — HYDROCODONE BITARTRATE AND ACETAMINOPHEN 7.5; 325 MG/1; MG/1
1 TABLET ORAL EVERY 6 HOURS PRN
Qty: 84 TABLET | Refills: 0 | Status: SHIPPED | OUTPATIENT
Start: 2017-11-09 | End: 2017-12-07

## 2017-11-09 NOTE — PROGRESS NOTES
Justine Scott  1955  [id number]    HISTORY OF PRESENT ILLNESS:  Ms. Gerson Hummel is a 65 y.o. female returns for a follow up visit for multiple medical problems. Her current presenting problems are   1. Fibromyalgia    2. Chronic pain syndrome    3. Rotator cuff tendonitis, right    . As per information/history obtained from the PADT(patient assessment and documentation tool) - She complains of pain in the shoulders Bilateral, chest, abdomen, upper back, mid back, lower back and buttocks with radiation to the arms Bilateral, elbows Bilateral, hands Bilateral, hips Bilateral, upper leg Bilateral, knees Bilateral, lower leg Bilateral, ankles Bilateral and feet Bilateral She rates the pain 9/10 and describes it as sharp. Pain is made worse by: movement, walking, standing, sitting, bending, lifting. Current treatment regimen has helped relieve about 10% of the pain. She denies side effects from the current pain regimen. Patient reports that since the last follow up visit the physical functioning is unchanged, family/social relationships are unchanged, mood is unchanged and sleep patterns are worse, and that the overall functioning is unchanged. Patient denies neurological bowel or bladder. Patient denies misusing/abusing her narcotic pain medications or using any illegal drugs. There are No indicators for possible drug abuse, addiction or diversion problems. Upon obtaining the medical history from Ms. Meza regarding today's office visit for her presenting problems, patient states her bones and muscles have been hurting \"really bad and tightening up. \" She states it hurts to move. She reports she has been using the Cymbalta and Trileptal.  Patient's  subjective report of her mood is fair. she describes occasional symptoms of depression, occasional  irritability and some mood swings. Describes her mood as being neutral and reports some pleasure in her daily activities.  Reports  fair  appetite, energy and capsule TAKE ONE CAPSULE BY MOUTHTWICE A DAY 60 capsule 0    OXcarbazepine (TRILEPTAL) 600 MG tablet TAKE 1 TABLET BY MOUTH 2 TIMES DAILY 60 tablet 0    nitroGLYCERIN (NITROSTAT) 0.4 MG SL tablet PLACE 1 TABLET UNDER THE TONGUE EVERY 5 MINUTES ASNEEDED FOR CHEST PAIN. 25 tablet 2    pantoprazole (PROTONIX) 40 MG tablet Take 1 tablet by mouth daily 30 tablet 3    ondansetron (ZOFRAN) 4 MG tablet TAKE ONE TABLET BY MOUTH EVERY EIGHT HOURS AS     NEEDED FOR NAUSEA OR     VOMITING. 30 tablet 5    RANEXA 500 MG extended release tablet TAKE 1 TABLET BY MOUTH 2 TIMES DAILY 180 tablet 0    ULTICARE SHORT PEN NEEDLES 31G X 8 MM MISC USE THREE TIMES A  each 6    topiramate (TOPAMAX) 50 MG tablet TAKE 1 TABLET BY MOUTH 2 TIMES DAILY 180 tablet 3    SUMAtriptan (IMITREX) 100 MG tablet TAKE 1 TABLET BY MOUTH 2 TIMES DAILY AS NEEDED FORMIGRAINE 9 tablet 2    DOCQLACE 100 MG capsule TAKE ONE CAPSULE BY MOUTHEVERY DAY 30 capsule 3    atorvastatin (LIPITOR) 80 MG tablet Take 1 tablet by mouth daily 90 tablet 3    aspirin EC 81 MG EC tablet Take 1 tablet by mouth daily 90 tablet 5    ranolazine (RANEXA) 500 MG extended release tablet Take 1 tablet by mouth 2 times daily 180 tablet 1    clopidogrel (PLAVIX) 75 MG tablet Take 1 tablet by mouth daily 90 tablet 1     No facility-administered medications prior to visit. SOCIAL/FAMILY/PAST MEDICAL HISTORY: Ms. Radha Barreto, family and past medical history was reviewed. REVIEW OF SYSTEMS:    Respiratory: Negative for apnea, chest tightness and shortness of breath or change in baseline breathing. Gastrointestinal: Negative for nausea, vomiting, abdominal pain, diarrhea, constipation, blood in stool and abdominal distention. PHYSICAL EXAM:   Nursing note and vitals reviewed. BP (!) 141/68 (Site: Right Arm, Position: Sitting)   Pulse 63   Wt 127 lb (57.6 kg)   Breastfeeding?  No   BMI 24.80 kg/m²   Constitutional: She appears well-developed and inhaler Inhale 2 puffs into the lungs every 6 hours as needed for Wheezing 1 Inhaler 5    ondansetron (ZOFRAN) 4 MG tablet Take 1 tablet by mouth every 8 hours as needed for Nausea or Vomiting 30 tablet 3    HYDROcodone-acetaminophen (NORCO) 7.5-325 MG per tablet Take 1 tablet by mouth every 6 hours as needed for Pain (max 2-3 per day) . 84 tablet 0    DULoxetine (CYMBALTA) 30 MG extended release capsule TAKE ONE CAPSULE BY MOUTHTWICE A DAY 60 capsule 0    OXcarbazepine (TRILEPTAL) 600 MG tablet TAKE 1 TABLET BY MOUTH 2 TIMES DAILY 60 tablet 0    nitroGLYCERIN (NITROSTAT) 0.4 MG SL tablet PLACE 1 TABLET UNDER THE TONGUE EVERY 5 MINUTES ASNEEDED FOR CHEST PAIN. 25 tablet 2    pantoprazole (PROTONIX) 40 MG tablet Take 1 tablet by mouth daily 30 tablet 3    ondansetron (ZOFRAN) 4 MG tablet TAKE ONE TABLET BY MOUTH EVERY EIGHT HOURS AS     NEEDED FOR NAUSEA OR     VOMITING. 30 tablet 5    RANEXA 500 MG extended release tablet TAKE 1 TABLET BY MOUTH 2 TIMES DAILY 180 tablet 0    ULTICARE SHORT PEN NEEDLES 31G X 8 MM MISC USE THREE TIMES A  each 6    topiramate (TOPAMAX) 50 MG tablet TAKE 1 TABLET BY MOUTH 2 TIMES DAILY 180 tablet 3    SUMAtriptan (IMITREX) 100 MG tablet TAKE 1 TABLET BY MOUTH 2 TIMES DAILY AS NEEDED FORMIGRAINE 9 tablet 2    DOCQLACE 100 MG capsule TAKE ONE CAPSULE BY MOUTHEVERY DAY 30 capsule 3    atorvastatin (LIPITOR) 80 MG tablet Take 1 tablet by mouth daily 90 tablet 3    aspirin EC 81 MG EC tablet Take 1 tablet by mouth daily 90 tablet 5    ranolazine (RANEXA) 500 MG extended release tablet Take 1 tablet by mouth 2 times daily 180 tablet 1    clopidogrel (PLAVIX) 75 MG tablet Take 1 tablet by mouth daily 90 tablet 1     No current facility-administered medications for this visit. I will continue her current medication regimen  which is part of the above treatment schedule. It has been helping with Ms. Meza's chronic  medical problems which for this visit include: Diagnoses of Fibromyalgia, Chronic pain syndrome, and Rotator cuff tendonitis, right were pertinent to this visit. Risks and benefits of the medications and other alternative treatments  including no treatment were discussed with the patient. The common side effects of these medications were also explained to the patient. Informed verbal consent was obtained. Goals of current treatment regimen include improvement in pain, restoration of functioning- with focus on improvement in physical performance, general activity, work or disability,emotional distress, health care utilization and  decreased medication consumption. Will continue to monitor progress towards achieving/maintaining therapeutic goals with special emphasis on  1. Improvement in perceived interfernce  of pain with ADL's. Ability to do home exercises independently. Ability to do household chores indoor and/or outdoor work and social and leisure activities. Improve psychosocial and physical functioning. - she is showing progression towards this treatment goal with the current regimen. She was advised against drinking alcohol with the narcotic pain medicines, advised against driving or handling machinery while adjusting the dose of medicines or if having cognitive  issues related to the current medications. Risk of overdose and death, if medicines not taken as prescribed, were also discussed. If the patient develops new symptoms or if the symptoms worsen, the patient should call the office. While transcribing every attempt was made to maintain the accuracy of the note in terms of it's contents,there may have been some errors made inadvertently. Thank you for allowing me to participate in the care of this patient. Mitch Jade MD.    Cc: MD STEVEN Still, Tamra Parikh, am scribing for and in the presence of Dr. Mitch Jade.    11/09/17  9:47 AM  RACHEL Damian, Dr. Mitch Jade, personally performed the services described in this documentation as scribed by   Vanessa Contreras MA in my presence and it is both accurate and complete

## 2017-11-10 ENCOUNTER — TELEPHONE (OUTPATIENT)
Dept: PAIN MANAGEMENT | Age: 62
End: 2017-11-10

## 2017-11-13 RX ORDER — DOCUSATE SODIUM 100 MG/1
CAPSULE, LIQUID FILLED ORAL
Qty: 30 CAPSULE | Refills: 2 | Status: SHIPPED | OUTPATIENT
Start: 2017-11-13 | End: 2018-02-08 | Stop reason: SDUPTHER

## 2017-11-13 NOTE — TELEPHONE ENCOUNTER
Per [facility], ok for patient to take the muscle relaxer with other medications, called and spoke to patient and informed her what he said, she voiced her understanding.

## 2017-11-15 DIAGNOSIS — R00.2 PALPITATION: ICD-10-CM

## 2017-11-15 DIAGNOSIS — R07.9 CHEST PAIN, UNSPECIFIED TYPE: ICD-10-CM

## 2017-11-25 PROBLEM — J44.1 COPD EXACERBATION (HCC): Status: ACTIVE | Noted: 2017-11-25

## 2017-11-28 ENCOUNTER — TELEPHONE (OUTPATIENT)
Dept: ORTHOPEDIC SURGERY | Age: 62
End: 2017-11-28

## 2017-11-28 NOTE — TELEPHONE ENCOUNTER
[address] right knee   Dr. Brooklynn Rosa referring  Nurse Tobias [phone]  Room [phone]  CC/CALI

## 2017-12-07 ENCOUNTER — OFFICE VISIT (OUTPATIENT)
Dept: PAIN MANAGEMENT | Age: 62
End: 2017-12-07

## 2017-12-07 VITALS
SYSTOLIC BLOOD PRESSURE: 133 MMHG | BODY MASS INDEX: 24.02 KG/M2 | HEART RATE: 75 BPM | DIASTOLIC BLOOD PRESSURE: 78 MMHG | WEIGHT: 123 LBS

## 2017-12-07 DIAGNOSIS — F51.01 PRIMARY INSOMNIA: ICD-10-CM

## 2017-12-07 DIAGNOSIS — M79.7 FIBROMYALGIA: ICD-10-CM

## 2017-12-07 DIAGNOSIS — G43.711 HEADACHE, CHRONIC MIGRAINE WITHOUT AURA, INTRACTABLE, WITH STATUS: ICD-10-CM

## 2017-12-07 DIAGNOSIS — G89.4 CHRONIC PAIN SYNDROME: ICD-10-CM

## 2017-12-07 DIAGNOSIS — F33.41 RECURRENT MAJOR DEPRESSIVE DISORDER, IN PARTIAL REMISSION (HCC): ICD-10-CM

## 2017-12-07 PROCEDURE — 1111F DSCHRG MED/CURRENT MED MERGE: CPT | Performed by: INTERNAL MEDICINE

## 2017-12-07 PROCEDURE — 3014F SCREEN MAMMO DOC REV: CPT | Performed by: INTERNAL MEDICINE

## 2017-12-07 PROCEDURE — 1036F TOBACCO NON-USER: CPT | Performed by: INTERNAL MEDICINE

## 2017-12-07 PROCEDURE — G8427 DOCREV CUR MEDS BY ELIG CLIN: HCPCS | Performed by: INTERNAL MEDICINE

## 2017-12-07 PROCEDURE — G8484 FLU IMMUNIZE NO ADMIN: HCPCS | Performed by: INTERNAL MEDICINE

## 2017-12-07 PROCEDURE — 99214 OFFICE O/P EST MOD 30 MIN: CPT | Performed by: INTERNAL MEDICINE

## 2017-12-07 PROCEDURE — G8420 CALC BMI NORM PARAMETERS: HCPCS | Performed by: INTERNAL MEDICINE

## 2017-12-07 PROCEDURE — G8598 ASA/ANTIPLAT THER USED: HCPCS | Performed by: INTERNAL MEDICINE

## 2017-12-07 PROCEDURE — 3017F COLORECTAL CA SCREEN DOC REV: CPT | Performed by: INTERNAL MEDICINE

## 2017-12-07 RX ORDER — DULOXETIN HYDROCHLORIDE 30 MG/1
CAPSULE, DELAYED RELEASE ORAL
Qty: 60 CAPSULE | Refills: 0 | Status: SHIPPED | OUTPATIENT
Start: 2017-12-07 | End: 2018-01-05 | Stop reason: SDUPTHER

## 2017-12-07 RX ORDER — OXYCODONE HYDROCHLORIDE AND ACETAMINOPHEN 5; 325 MG/1; MG/1
1 TABLET ORAL EVERY 6 HOURS PRN
Qty: 84 TABLET | Refills: 0 | Status: SHIPPED | OUTPATIENT
Start: 2017-12-07 | End: 2018-01-05 | Stop reason: SDUPTHER

## 2017-12-07 RX ORDER — OXCARBAZEPINE 600 MG/1
TABLET, FILM COATED ORAL
Qty: 60 TABLET | Refills: 0 | Status: SHIPPED | OUTPATIENT
Start: 2017-12-07 | End: 2018-01-05 | Stop reason: SDUPTHER

## 2017-12-07 RX ORDER — TIZANIDINE 4 MG/1
TABLET ORAL
Qty: 60 TABLET | Refills: 0 | Status: SHIPPED | OUTPATIENT
Start: 2017-12-07 | End: 2017-12-19

## 2017-12-07 RX ORDER — AMITRIPTYLINE HYDROCHLORIDE 25 MG/1
25-50 TABLET, FILM COATED ORAL NIGHTLY
Qty: 60 TABLET | Refills: 0 | Status: SHIPPED | OUTPATIENT
Start: 2017-12-07 | End: 2018-01-05 | Stop reason: SDUPTHER

## 2017-12-07 NOTE — PROGRESS NOTES
[facility]  1955  [id number]    HISTORY OF PRESENT ILLNESS:  Ms. Marisela Drummond is a 65 y.o. female returns for a follow up visit for multiple medical problems. Her current presenting problems are   1. Primary insomnia    2. Fibromyalgia    3. Chronic pain syndrome    4. Headache, chronic migraine without aura, intractable, with status    5. Recurrent major depressive disorder, in partial remission ([facility] [address]    . As per information/history obtained from the PADT(patient assessment and documentation tool) - She complains of pain in the neck and lower back with radiation to the shoulders Bilateral, arms Bilateral, elbows Bilateral, hands Bilateral, buttocks, hips Bilateral, upper leg Bilateral, knees Bilateral, lower leg Bilateral, ankles Bilateral and feet Bilateral She rates the pain 9/10 and describes it as sharp, aching. Pain is made worse by: movement, walking, standing, sitting, bending, lifting. Current treatment regimen has helped relieve about 50% of the pain. She denies side effects from the current pain regimen. Patient reports that since the last follow up visit the physical functioning is unchanged, family/social relationships are unchanged, mood is unchanged and sleep patterns are unchanged, and that the overall functioning is unchanged. Patient denies neurological bowel or bladder. Patient denies misusing/abusing her narcotic pain medications or using any illegal drugs. There are No indicators for possible drug abuse, addiction or diversion problems. Upon obtaining the medical history from Ms. Marisela Drummond regarding today's office visit for her presenting problems, Patient states she had to go to the hospital. She complains she got sick after getting the flu shot. She says she had COPD problems. She mentions her breathing is baseline, and is not on oxygen. She reports she was on steroids and antibiotics. She says she is using Norco 3 per day. She denies any constipation symptoms.  She complains of Visit   Medication Sig Dispense Refill    insulin lispro (HUMALOG) 100 UNIT/ML injection vial Inject into the skin 4 times daily (after meals and at bedtime)       MG capsule TAKE ONE CAPSULE BY MOUTHEVERY DAY 30 capsule 2    DULoxetine (CYMBALTA) 30 MG extended release capsule TAKE ONE CAPSULE BY MOUTHTWICE A DAY 60 capsule 0    OXcarbazepine (TRILEPTAL) 600 MG tablet TAKE 1 TABLET BY MOUTH 2 TIMES DAILY 60 tablet 0    insulin glargine (LANTUS SOLOSTAR) 100 UNIT/ML injection pen Inject 35-40 Units into the skin 2 times daily Use 40  units in am and 35  units at night 6 Pen 5    insulin aspart (NOVOLOG FLEXPEN) 100 UNIT/ML injection pen Inject 5-15 Units into the skin 3 times daily (before meals) 5 Pen 5    amLODIPine (NORVASC) 10 MG tablet Take 1 tablet by mouth daily 30 tablet 5    metoprolol succinate (TOPROL XL) 25 MG extended release tablet Take 0.5 tablets by mouth 2 times daily 60 tablet 5    isosorbide mononitrate (IMDUR) 30 MG extended release tablet Take 1 tablet by mouth daily 30 tablet 5    budesonide-formoterol (SYMBICORT) 160-4.5 MCG/ACT AERO Inhale 2 puffs into the lungs daily 1 Inhaler 5    albuterol sulfate HFA (PROAIR HFA) 108 (90 Base) MCG/ACT inhaler Inhale 2 puffs into the lungs every 6 hours as needed for Wheezing 1 Inhaler 5    nitroGLYCERIN (NITROSTAT) 0.4 MG SL tablet PLACE 1 TABLET UNDER THE TONGUE EVERY 5 MINUTES ASNEEDED FOR CHEST PAIN. 25 tablet 2    pantoprazole (PROTONIX) 40 MG tablet Take 1 tablet by mouth daily 30 tablet 3    ondansetron (ZOFRAN) 4 MG tablet TAKE ONE TABLET BY MOUTH EVERY EIGHT HOURS AS     NEEDED FOR NAUSEA OR     VOMITING.  30 tablet 5    ULTICARE SHORT PEN NEEDLES 31G X 8 MM MISC USE THREE TIMES A  each 6    topiramate (TOPAMAX) 50 MG tablet TAKE 1 TABLET BY MOUTH 2 TIMES DAILY 180 tablet 3    SUMAtriptan (IMITREX) 100 MG tablet TAKE 1 TABLET BY MOUTH 2 TIMES DAILY AS NEEDED FORMIGRAINE 9 tablet 2    atorvastatin (LIPITOR) 80 MG tablet Take 1 tablet by mouth daily 90 tablet 3    aspirin EC 81 MG EC tablet Take 1 tablet by mouth daily 90 tablet 5    ranolazine (RANEXA) 500 MG extended release tablet Take 1 tablet by mouth 2 times daily 180 tablet 1    clopidogrel (PLAVIX) 75 MG tablet Take 1 tablet by mouth daily 90 tablet 1    HYDROcodone-acetaminophen (NORCO) 7.5-325 MG per tablet Take 1 tablet by mouth every 6 hours as needed for Pain (max 2-3 per day) . 84 tablet 0    methocarbamol (ROBAXIN) 500 MG tablet Take 1 tablet by mouth 2 times daily as needed (muscle spasms) 60 tablet 0     No facility-administered medications prior to visit. REVIEW OF SYSTEMS:   Constitutional: Negative for fatigue and unexpected weight change. Eyes: Negative for visual disturbance. Respiratory: Negative for shortness of breath. Cardiovascular: Negative for chest pain, palpitations  Gastrointestinal: Negative for blood in stool, abdominal distention, nausea, vomiting, abdominal pain, diarrhea,constipation. Skin: Negative for color change or any abnormal bruising. Neurological: Negative for speech difficulty, weakness and light-headedness, dizziness, tremors, sleepiness  Psychiatric/Behavioral: Negative for suicidal ideas, hallucinations, behavioral problems, self-injury, decreased concentration/cognition, agitation, confusion. PHYSICAL EXAM:   Nursing note and vitals reviewed. /78 (Site: Left Arm, Position: Sitting)   Pulse 75   Wt 123 lb (55.8 kg)   Breastfeeding? No   BMI 24.02 kg/m²   General Appearance: Patient is well nourished, well developed, well groomed and in no acute distress. Skin: Skin is warm and dry, good turgor . No rash or lesions noted. She is not diaphoretic. Pulmonary/Chest: Effort normal. No respiratory distress or use of accessory muscles. Auscultation revealing normal air entry. She does not have wheezes in the lung fields. She has no rales.    Cardiovascular: Normal rate, regular rhythm, normal strong smells and skipping meals. -Monitor blood sugar regularly, diabetic control- adv diabetic diet. Goal for fasting blood sugars around 120. Follow up with Endocrinologist/PCP also for on going management    -Switch to Percocet 5 mg 3 per day   -She was advised to increase fluids ( 5-7  glasses of fluid daily), limit caffeine, avoid cheese products, increase dietary fiber, increase activity and exercise as tolerated and relax regularly and enjoy meals   -Pulmonary rehab exercises   -Start Zanaflex 6 mg in divided doses the help with spasms and headaches   -Records and labs reviewed from [facility].   Elroy Keller was seen today for follow-up. Diagnoses and all orders for this visit:    Primary insomnia  -     amitriptyline (ELAVIL) 25 MG tablet; Take 1-2 tablets by mouth nightly    Fibromyalgia  -     oxyCODONE-acetaminophen (PERCOCET) 5-325 MG per tablet; Take 1 tablet by mouth every 6 hours as needed for Pain  Max 3 per day. -     tiZANidine (ZANAFLEX) 4 MG tablet; Take 1/2 tablet by mouth in the AM, 1 in the PM  -     OXcarbazepine (TRILEPTAL) 600 MG tablet; TAKE 1 TABLET BY MOUTH 2 TIMES DAILY    Chronic pain syndrome  -     oxyCODONE-acetaminophen (PERCOCET) 5-325 MG per tablet; Take 1 tablet by mouth every 6 hours as needed for Pain  Max 3 per day. -     amitriptyline (ELAVIL) 25 MG tablet; Take 1-2 tablets by mouth nightly  -     tiZANidine (ZANAFLEX) 4 MG tablet; Take 1/2 tablet by mouth in the AM, 1 in the PM  -     DULoxetine (CYMBALTA) 30 MG extended release capsule; TAKE ONE CAPSULE BY MOUTHTWICE A DAY  -     OXcarbazepine (TRILEPTAL) 600 MG tablet; TAKE 1 TABLET BY MOUTH 2 TIMES DAILY    Headache, chronic migraine without aura, intractable, with status  -     tiZANidine (ZANAFLEX) 4 MG tablet;  Take 1/2 tablet by mouth in the AM, 1 in the PM  -     OXcarbazepine (TRILEPTAL) 600 MG tablet; TAKE 1 TABLET BY MOUTH 2 TIMES DAILY    [facility], [facility]  - DULoxetine (CYMBALTA) 30 MG extended release capsule; TAKE ONE CAPSULE BY MOUTHTWICE A DAY        Goals of current treatment regimen include improvement in pain, restoration of functioning- with focus on improvement in physical performance, general activity, work or disability,emotional distress, health care utilization and  decreased medication consumption. Will continue to monitor progress towards achieving/maintaining therapeutic goals with special emphasis on  1. Improvement in perceived interfernce  of pain with ADL's. Ability to do home exercises independently. Ability to do household chores indoor and/or outdoor work and social and leisure activities. To increase flexibility/ROM, strength and endurance. Improve psychosocial and physical functioning.- she is not showing any significant progress/or showing regression  towards this goal and reassessment and adjustment of goals/treatment have been made. 2. Improving sleep to 6-7 hours a night. Improve mood/ anxiety and depression symptoms such as crying spells, low energy, problems with concentration, motivation. - she is not showing any significant progress/or showing regression  towards this goal and reassessment and adjustment of goals/treatment have been made. 3. Reduction of reliance on opioid analgesia/more appropriate opioid use. - she is showing progression towards this treatment goal with the current regimen. Risks and benefits of the medications and other alternative treatments have been/were  discussed with the patient. Any questions on the  common side effects of these medications were also answered. She was advised against drinking alcohol with the narcotic pain medicines, advised against driving or handling machinery when  starting or adjusting the dose of medicines, feeling groggy or drowsy, or if having any cognitive issues related to the current medications.  Sheis fully aware of the risk of overdose and death, if medicines are misused and not taken as prescribed. If she develops new symptoms or if the symptoms worsen, she was told to call the office. Thank you for allowing me to participate in the care of this patient. Lawyer John MD.    Cc: Jeffrey Mcfadden MD   I, Simona Paul, am scribing for and in the presence of Dr. Lawyer Lopez.    12/07/17  9:11 AM  Simona Paul MA   I, Dr. Lawyer Lopez, personally performed the services described in this documentation as scribed by   Simona Paul MA in my presence and it is both accurate and complete

## 2017-12-09 DIAGNOSIS — I25.10 CORONARY ARTERY DISEASE INVOLVING NATIVE CORONARY ARTERY OF NATIVE HEART WITHOUT ANGINA PECTORIS: ICD-10-CM

## 2017-12-11 RX ORDER — CLOPIDOGREL BISULFATE 75 MG/1
75 TABLET ORAL DAILY
Qty: 90 TABLET | Refills: 0 | Status: SHIPPED | OUTPATIENT
Start: 2017-12-11 | End: 2018-03-09 | Stop reason: SDUPTHER

## 2017-12-19 ENCOUNTER — OFFICE VISIT (OUTPATIENT)
Dept: PRIMARY CARE CLINIC | Age: 62
End: 2017-12-19

## 2017-12-19 VITALS
HEART RATE: 96 BPM | OXYGEN SATURATION: 98 % | TEMPERATURE: 99.2 F | SYSTOLIC BLOOD PRESSURE: 140 MMHG | HEIGHT: 60 IN | DIASTOLIC BLOOD PRESSURE: 80 MMHG | WEIGHT: 123 LBS | BODY MASS INDEX: 24.15 KG/M2

## 2017-12-19 DIAGNOSIS — K21.9 GASTROESOPHAGEAL REFLUX DISEASE WITHOUT ESOPHAGITIS: ICD-10-CM

## 2017-12-19 DIAGNOSIS — N28.9 RENAL INSUFFICIENCY: ICD-10-CM

## 2017-12-19 DIAGNOSIS — Z09 HOSPITAL DISCHARGE FOLLOW-UP: Primary | ICD-10-CM

## 2017-12-19 PROCEDURE — G8484 FLU IMMUNIZE NO ADMIN: HCPCS | Performed by: INTERNAL MEDICINE

## 2017-12-19 PROCEDURE — 1111F DSCHRG MED/CURRENT MED MERGE: CPT | Performed by: INTERNAL MEDICINE

## 2017-12-19 PROCEDURE — 99214 OFFICE O/P EST MOD 30 MIN: CPT | Performed by: INTERNAL MEDICINE

## 2017-12-19 PROCEDURE — 3017F COLORECTAL CA SCREEN DOC REV: CPT | Performed by: INTERNAL MEDICINE

## 2017-12-19 PROCEDURE — G8598 ASA/ANTIPLAT THER USED: HCPCS | Performed by: INTERNAL MEDICINE

## 2017-12-19 PROCEDURE — G8420 CALC BMI NORM PARAMETERS: HCPCS | Performed by: INTERNAL MEDICINE

## 2017-12-19 PROCEDURE — 3046F HEMOGLOBIN A1C LEVEL >9.0%: CPT | Performed by: INTERNAL MEDICINE

## 2017-12-19 PROCEDURE — G8427 DOCREV CUR MEDS BY ELIG CLIN: HCPCS | Performed by: INTERNAL MEDICINE

## 2017-12-19 PROCEDURE — 1036F TOBACCO NON-USER: CPT | Performed by: INTERNAL MEDICINE

## 2017-12-19 PROCEDURE — 3014F SCREEN MAMMO DOC REV: CPT | Performed by: INTERNAL MEDICINE

## 2017-12-19 RX ORDER — RANITIDINE 150 MG/1
150 TABLET ORAL 2 TIMES DAILY PRN
Qty: 60 TABLET | Refills: 3 | Status: SHIPPED | OUTPATIENT
Start: 2017-12-19 | End: 2018-08-13 | Stop reason: SDUPTHER

## 2017-12-19 RX ORDER — ATORVASTATIN CALCIUM 80 MG/1
80 TABLET, FILM COATED ORAL DAILY
Qty: 90 TABLET | Refills: 3 | Status: SHIPPED | OUTPATIENT
Start: 2017-12-19 | End: 2018-02-07 | Stop reason: SDUPTHER

## 2017-12-19 RX ORDER — ONDANSETRON 4 MG/1
4 TABLET, FILM COATED ORAL EVERY 8 HOURS PRN
Qty: 30 TABLET | Refills: 5 | Status: SHIPPED | OUTPATIENT
Start: 2017-12-19 | End: 2018-02-07 | Stop reason: SDUPTHER

## 2017-12-19 RX ORDER — ASPIRIN 81 MG/1
81 TABLET ORAL DAILY
Qty: 90 TABLET | Refills: 5 | Status: SHIPPED | OUTPATIENT
Start: 2017-12-19 | End: 2018-02-07 | Stop reason: SDUPTHER

## 2017-12-19 ASSESSMENT — ENCOUNTER SYMPTOMS
NAUSEA: 0
GASTROINTESTINAL NEGATIVE: 1
EYE PAIN: 0
CHEST TIGHTNESS: 0
PHOTOPHOBIA: 0
ABDOMINAL DISTENTION: 0
EYE REDNESS: 0
VISUAL CHANGE: 0
EYES NEGATIVE: 1
SINUS PRESSURE: 0
ABDOMINAL PAIN: 0
SORE THROAT: 0

## 2017-12-19 ASSESSMENT — PATIENT HEALTH QUESTIONNAIRE - PHQ9
1. LITTLE INTEREST OR PLEASURE IN DOING THINGS: 0
SUM OF ALL RESPONSES TO PHQ QUESTIONS 1-9: 0
SUM OF ALL RESPONSES TO PHQ9 QUESTIONS 1 & 2: 0
2. FEELING DOWN, DEPRESSED OR HOPELESS: 0

## 2017-12-19 NOTE — PROGRESS NOTES
Subjective:      Patient ID: Rolan Thacker is a 65 y.o. female. 12/19/17 Patient presents with: Follow-Up from Hospital  Headache    Admit Date: 11/25/2017      Discharge Date:   11/29/2017      . COPD exacerbation  - treated  with steroids-prednisone  Continue home, nebs, dulera  . DMII uncontrolled - continue insulin @ home dose now, though during admission, this was increased to accommodate solumedrol    Acute bronchitis - treated with  doxycycline   . Review of Systems   Constitutional: Negative for fever and unexpected weight change. Flu vac 11/17    tdap 10/16     pneumovac 10/13         HENT: Negative for sinus pressure and sore throat. Dry mouth    Eyes: Negative. Negative for photophobia, pain and redness. Last Eye Ex 2016   Respiratory: Negative for chest tightness. Stopped smoking for now   Denies Etoh . No rec drugs . Cardiovascular:        Known CAD with Stents . New stents x 2 ;4/15       Gastrointestinal: Negative. Negative for abdominal distention, abdominal pain and nausea. No Fh of ca colon . Colonoscopy recent   Genitourinary: Negative for dysuria. Hysterectomy   Mammogram 1/16   Musculoskeletal: Positive for arthralgias. Pain Disorder c/o Pain Management   Skin: Negative for rash. Neurological: Positive for headaches (off and on ). Negative for numbness. Hematological:        Did see Hematology for anemia . Bone Marrow exam Nl . Psychiatric/Behavioral: Positive for sleep disturbance. Objective:   Physical Exam   Constitutional: She is oriented to person, place, and time. No distress. HENT:   Mouth/Throat: Oropharynx is clear and moist.   Eyes: Conjunctivae and EOM are normal.   Neck: Neck supple. Cardiovascular: Normal rate, regular rhythm and normal heart sounds. Pulmonary/Chest: Breath sounds normal.   Reduced BS   Abdominal: She exhibits distension. There is no tenderness.    Musculoskeletal: Normal range of motion. Foot exam 4/17      Neurological: She is alert and oriented to person, place, and time. She has normal strength. Skin: Skin is warm. Psychiatric: Her behavior is normal.   Vitals reviewed. Assessment:   Maren was seen today for follow-up from hospital and headache. Diagnoses and all orders for this visit:    Hospital discharge follow-up    Renal insufficiency scheduled to see Nephrology  DC PPI     Gastroesophageal reflux disease without esophagitis  DC ppi ; take zantac bid prn   -     ranitidine (ZANTAC) 150 MG tablet; Take 1 tablet by mouth 2 times daily as needed for Heartburn      Uncontrolled type 2 diabetes mellitus with complication, with long-term current use of insulin ([facility]) A1C 10.3 . Increase am Lantus to 40 U + continue 35 U in pm   + Novolog tid per SS   Check / record sugar tid          Essential hypertension  Controlled . Cont same   -     amLODIPine (NORVASC) 10 MG tablet; Take 1 tablet by mouth daily  -     torsemide (DEMADEX) 20 MG tablet; Take 1 tablet by mouth daily  -     metoprolol succinate (TOPROL XL) 25 MG extended release tablet; Take 0.5 tablets by mouth 2 times daily  -     isosorbide mononitrate (IMDUR) 30 MG extended release tablet; Take 1 tablet by mouth daily      Chronic obstructive pulmonary disease, unspecified COPD type ([facility] [address]  -     budesonide-formoterol (SYMBICORT) 160-4.5 MCG/ACT AERO; Inhale 2 puffs into the lungs daily  -     albuterol sulfate HFA (PROAIR HFA) 108 (90 Base) MCG/ACT inhaler; Inhale 2 puffs into the lungs every 6 hours as needed for Wheezing      Mixed hyperlipidemia  Lipitor 80   -       Coronary artery disease involving native coronary artery of native heart without angina pectoris lopressor + Lipitor + aspirin + Ranexa ?+ Plavix   -         Other insomnia  ambien   -      Anxiety  . cymbalta per Pain Dr   -         (Tachy-kaden syndrome [facility]) . H/O severe bradycardia .  Cannot tolerate B blockers or calcium channel blockers )  Back on

## 2017-12-20 ENCOUNTER — TELEPHONE (OUTPATIENT)
Dept: CARDIOLOGY CLINIC | Age: 62
End: 2017-12-20

## 2017-12-20 NOTE — TELEPHONE ENCOUNTER
Maren called in this morning stating that she went to go see her PCP Dr. Darline Goldman yesterday and she mentioned something about Maren being on Ranexa and having kidney disease. She was told to check with Dr. Fabiana Kwok.     Maren can be reached at [phone]

## 2017-12-21 NOTE — TELEPHONE ENCOUNTER
[facility] is requesting script for diabetic supplies and lidocaine ointment. They can accept a verbal.   They also sent a fax twice.     REFERENCE [id number]     P:  [phone]

## 2018-01-05 ENCOUNTER — OFFICE VISIT (OUTPATIENT)
Dept: PAIN MANAGEMENT | Age: 63
End: 2018-01-05

## 2018-01-05 VITALS
BODY MASS INDEX: 25.08 KG/M2 | WEIGHT: 128.4 LBS | DIASTOLIC BLOOD PRESSURE: 62 MMHG | HEART RATE: 63 BPM | SYSTOLIC BLOOD PRESSURE: 152 MMHG

## 2018-01-05 DIAGNOSIS — G89.4 CHRONIC PAIN SYNDROME: ICD-10-CM

## 2018-01-05 DIAGNOSIS — E11.40 CHRONIC PAINFUL DIABETIC NEUROPATHY (HCC): ICD-10-CM

## 2018-01-05 DIAGNOSIS — M79.7 FIBROMYALGIA: ICD-10-CM

## 2018-01-05 PROCEDURE — 3046F HEMOGLOBIN A1C LEVEL >9.0%: CPT | Performed by: INTERNAL MEDICINE

## 2018-01-05 PROCEDURE — G8598 ASA/ANTIPLAT THER USED: HCPCS | Performed by: INTERNAL MEDICINE

## 2018-01-05 PROCEDURE — 3017F COLORECTAL CA SCREEN DOC REV: CPT | Performed by: INTERNAL MEDICINE

## 2018-01-05 PROCEDURE — G8484 FLU IMMUNIZE NO ADMIN: HCPCS | Performed by: INTERNAL MEDICINE

## 2018-01-05 PROCEDURE — G8427 DOCREV CUR MEDS BY ELIG CLIN: HCPCS | Performed by: INTERNAL MEDICINE

## 2018-01-05 PROCEDURE — G8419 CALC BMI OUT NRM PARAM NOF/U: HCPCS | Performed by: INTERNAL MEDICINE

## 2018-01-05 PROCEDURE — 1036F TOBACCO NON-USER: CPT | Performed by: INTERNAL MEDICINE

## 2018-01-05 PROCEDURE — 99213 OFFICE O/P EST LOW 20 MIN: CPT | Performed by: INTERNAL MEDICINE

## 2018-01-05 PROCEDURE — 3014F SCREEN MAMMO DOC REV: CPT | Performed by: INTERNAL MEDICINE

## 2018-01-05 RX ORDER — DULOXETIN HYDROCHLORIDE 30 MG/1
CAPSULE, DELAYED RELEASE ORAL
Qty: 60 CAPSULE | Refills: 0 | Status: SHIPPED | OUTPATIENT
Start: 2018-01-05 | End: 2018-01-26 | Stop reason: SDUPTHER

## 2018-01-05 RX ORDER — OXYCODONE HYDROCHLORIDE AND ACETAMINOPHEN 5; 325 MG/1; MG/1
1 TABLET ORAL EVERY 6 HOURS PRN
Qty: 84 TABLET | Refills: 0 | Status: SHIPPED | OUTPATIENT
Start: 2018-01-05 | End: 2018-01-26 | Stop reason: SDUPTHER

## 2018-01-05 RX ORDER — AMITRIPTYLINE HYDROCHLORIDE 25 MG/1
25-50 TABLET, FILM COATED ORAL NIGHTLY
Qty: 60 TABLET | Refills: 0 | Status: SHIPPED | OUTPATIENT
Start: 2018-01-05 | End: 2018-01-26 | Stop reason: SDUPTHER

## 2018-01-05 RX ORDER — OXCARBAZEPINE 600 MG/1
TABLET, FILM COATED ORAL
Qty: 60 TABLET | Refills: 0 | Status: SHIPPED | OUTPATIENT
Start: 2018-01-05 | End: 2018-01-26 | Stop reason: SDUPTHER

## 2018-01-05 NOTE — LETTER
[facility]  [address] [address] [facility] [place] [address] Hilda Robins  Phone: [phone]  Fax: [phone]    Lyla Daily MD        January 5, 2018    Dwayne Bledsoe  [address]       To Whom It May Concern:    Ms. Luis Alberto Hernández was seen in my office for the following reasons:    1. Fibromyalgia     2. Chronic pain syndrome     3. Chronic painful diabetic neuropathy [facility])       It is my medical opinion that Ms. Luis Alberto Hernández would benefit from family support for ADL'S and house chores. If you have any questions or concerns, please don't hesitate to call.     Sincerely,        Lyla Daily MD

## 2018-01-05 NOTE — PROGRESS NOTES
Sonya Castro  1955  [id number]    HISTORY OF PRESENT ILLNESS:  Ms. Sharmila Bustillo is a 65 y.o. female returns for a follow up visit for multiple medical problems. Her current presenting problems are   1. Fibromyalgia    2. Chronic pain syndrome    3. Chronic painful diabetic neuropathy ([facility] [address]    . As per information/history obtained from the PADT(patient assessment and documentation tool) - She complains of pain in the head, shoulders Bilateral, abdomen, lower back and buttocks with radiation to the hips Bilateral, knees Right and feet Bilateral She rates the pain 9/10 and describes it as sharp, aching. Pain is made worse by: movement, walking, standing, sitting, bending, lifting. Current treatment regimen has helped relieve about 10% of the pain. She denies side effects from the current pain regimen. Patient reports that since the last follow up visit the physical functioning is unchanged, family/social relationships are unchanged, mood is unchanged and sleep patterns are unchanged, and that the overall functioning is unchanged. Patient denies neurological bowel or bladder. Patient denies misusing/abusing her narcotic pain medications or using any illegal drugs. There are No indicators for possible drug abuse, addiction or diversion problems. Upon obtaining the medical history from Ms. Sharmila Bustillo regarding today's office visit for her presenting problems, patient states she has been having increased headaches and will be seeing her PCP for it. She is currently taking Topamax. She states her pain has been baseline, tolerable somewhat. Patient states her sleep is fair, using Elavil. Has fairly normal sleep latency. Averages about 4-6 hours of sleep a night. Denies any signs of sleep apnea. Feels somewhat rested in the morning. Not sure if she is compliant with meds. She states she lives on her own and has an aide coming.      ALLERGIES: Patients list of allergies were reviewed     MEDICATIONS: Ms. Sharmila Bustillo list of nitroGLYCERIN (NITROSTAT) 0.4 MG SL tablet PLACE 1 TABLET UNDER THE TONGUE EVERY 5 MINUTES ASNEEDED FOR CHEST PAIN. 25 tablet 2    ULTICARE SHORT PEN NEEDLES 31G X 8 MM MISC USE THREE TIMES A  each 6    topiramate (TOPAMAX) 50 MG tablet TAKE 1 TABLET BY MOUTH 2 TIMES DAILY 180 tablet 3    SUMAtriptan (IMITREX) 100 MG tablet TAKE 1 TABLET BY MOUTH 2 TIMES DAILY AS NEEDED FORMIGRAINE 9 tablet 2    ranolazine (RANEXA) 500 MG extended release tablet Take 1 tablet by mouth 2 times daily 180 tablet 1     No facility-administered medications prior to visit. SOCIAL/FAMILY/PAST MEDICAL HISTORY: Ms. Leif Manuel, family and past medical history was reviewed. REVIEW OF SYSTEMS:    Respiratory: Negative for apnea, chest tightness and shortness of breath or change in baseline breathing. Gastrointestinal: Negative for nausea, vomiting, abdominal pain, diarrhea, constipation, blood in stool and abdominal distention. PHYSICAL EXAM:   Nursing note and vitals reviewed. BP (!) 152/62 (Site: Left Arm, Position: Sitting)   Pulse 63   Wt 128 lb 6.4 oz (58.2 kg)   Breastfeeding? No   BMI 25.08 kg/m²   Constitutional: She appears well-developed and well-nourished. No acute distress. Skin: Skin is warm and dry, good turgor. No rash noted. She is not diaphoretic. Cardiovascular: Normal rate, regular rhythm, normal heart sounds, and does not have murmur. Pulmonary/Chest: Effort normal. No respiratory distress. She does not have wheezes in the lung fields. She has no rales. Neurological/Psychiatric:She is alert and oriented to person, place, and time. Coordination is  normal. Her mood isAnxious and affect is Flat/blunted . IMPRESSION:   1. Fibromyalgia    2. Chronic pain syndrome    3. Chronic painful diabetic neuropathy (HCC)        PLAN:  Informed verbal consent was obtained  -[place] record was obtained and reviewed  for the last one year and no indicators of drug misuse  were found.  Any (LANTUS SOLOSTAR) 100 UNIT/ML injection pen Inject 35-40 Units into the skin 2 times daily Use 40  units in am and 35  units at night 6 Pen 5    insulin aspart (NOVOLOG FLEXPEN) 100 UNIT/ML injection pen Inject 5-15 Units into the skin 3 times daily (before meals) 5 Pen 5    amLODIPine (NORVASC) 10 MG tablet Take 1 tablet by mouth daily 30 tablet 5    metoprolol succinate (TOPROL XL) 25 MG extended release tablet Take 0.5 tablets by mouth 2 times daily 60 tablet 5    isosorbide mononitrate (IMDUR) 30 MG extended release tablet Take 1 tablet by mouth daily 30 tablet 5    budesonide-formoterol (SYMBICORT) 160-4.5 MCG/ACT AERO Inhale 2 puffs into the lungs daily 1 Inhaler 5    albuterol sulfate HFA (PROAIR HFA) 108 (90 Base) MCG/ACT inhaler Inhale 2 puffs into the lungs every 6 hours as needed for Wheezing 1 Inhaler 5    nitroGLYCERIN (NITROSTAT) 0.4 MG SL tablet PLACE 1 TABLET UNDER THE TONGUE EVERY 5 MINUTES ASNEEDED FOR CHEST PAIN. 25 tablet 2    ULTICARE SHORT PEN NEEDLES 31G X 8 MM MISC USE THREE TIMES A  each 6    topiramate (TOPAMAX) 50 MG tablet TAKE 1 TABLET BY MOUTH 2 TIMES DAILY 180 tablet 3    SUMAtriptan (IMITREX) 100 MG tablet TAKE 1 TABLET BY MOUTH 2 TIMES DAILY AS NEEDED FORMIGRAINE 9 tablet 2    ranolazine (RANEXA) 500 MG extended release tablet Take 1 tablet by mouth 2 times daily 180 tablet 1     No current facility-administered medications for this visit. I will continue her current medication regimen  which is part of the above treatment schedule. It has been helping with Ms. Meza's chronic  medical problems which for this visit include:   Diagnoses of Fibromyalgia, Chronic pain syndrome, and Chronic painful diabetic neuropathy ([address] were pertinent to this visit. Risks and benefits of the medications and other alternative treatments  including no treatment were discussed with the patient. The common side effects of these medications were also explained to the patient.

## 2018-01-08 ENCOUNTER — TELEPHONE (OUTPATIENT)
Dept: PRIMARY CARE CLINIC | Age: 63
End: 2018-01-08

## 2018-01-08 ENCOUNTER — TELEPHONE (OUTPATIENT)
Dept: CARDIOLOGY CLINIC | Age: 63
End: 2018-01-08

## 2018-01-08 DIAGNOSIS — G89.4 CHRONIC PAIN SYNDROME: ICD-10-CM

## 2018-01-08 DIAGNOSIS — F51.01 PRIMARY INSOMNIA: ICD-10-CM

## 2018-01-08 DIAGNOSIS — M79.7 FIBROMYALGIA: ICD-10-CM

## 2018-01-08 DIAGNOSIS — G43.711 HEADACHE, CHRONIC MIGRAINE WITHOUT AURA, INTRACTABLE, WITH STATUS: ICD-10-CM

## 2018-01-08 NOTE — TELEPHONE ENCOUNTER
Called and spoke with pt. Pt would like to know if there is something else she can take since her HA are \"so intense\". Pt states PCP has her on Topamax daily, has tried Tylenol, and take percocet daily and that is not helping at all. Pt states if there isn't a substitute would she be able to stop the med? Please advise, thank you.

## 2018-01-08 NOTE — TELEPHONE ENCOUNTER
I spoke with pt. She states she has not been taking topamax but she will call pharm and get refill medication. Pt also thinks it may be from her having chronic pain.   Call complete

## 2018-01-09 DIAGNOSIS — I25.10 CORONARY ARTERY DISEASE INVOLVING NATIVE CORONARY ARTERY OF NATIVE HEART WITHOUT ANGINA PECTORIS: ICD-10-CM

## 2018-01-09 RX ORDER — OXCARBAZEPINE 600 MG/1
TABLET, FILM COATED ORAL
Qty: 60 TABLET | OUTPATIENT
Start: 2018-01-09

## 2018-01-09 RX ORDER — RANOLAZINE 1000 MG/1
1000 TABLET, EXTENDED RELEASE ORAL 2 TIMES DAILY
Qty: 60 TABLET | Refills: 3 | Status: SHIPPED | OUTPATIENT
Start: 2018-01-09 | End: 2018-06-11 | Stop reason: SDUPTHER

## 2018-01-09 RX ORDER — AMITRIPTYLINE HYDROCHLORIDE 25 MG/1
TABLET, FILM COATED ORAL
Qty: 60 TABLET | OUTPATIENT
Start: 2018-01-09

## 2018-01-09 NOTE — TELEPHONE ENCOUNTER
Spoke with Maren and instructed her with Dr. Avanell Cooks instructions below. She verbalized understanding. I will send new script to [facility].    She also request a f/u appt.-made for 2/5/18 at 0945

## 2018-01-11 RX ORDER — CALCIUM CITRATE/VITAMIN D3 200MG-6.25
TABLET ORAL
Qty: 350 EACH | Refills: 5 | Status: SHIPPED | OUTPATIENT
Start: 2018-01-11 | End: 2018-02-07

## 2018-01-11 RX ORDER — LANCING DEVICE
EACH MISCELLANEOUS
Qty: 400 EACH | Refills: 5 | Status: SHIPPED | OUTPATIENT
Start: 2018-01-11 | End: 2018-02-07

## 2018-01-11 RX ORDER — LANCING DEVICE
EACH MISCELLANEOUS
Qty: 1 EACH | Refills: 0 | Status: SHIPPED | OUTPATIENT
Start: 2018-01-11 | End: 2018-02-07

## 2018-01-11 RX ORDER — ISOPROPYL ALCOHOL 0.7 ML/ML
SWAB TOPICAL
Qty: 100 EACH | Refills: 5 | Status: SHIPPED | OUTPATIENT
Start: 2018-01-11 | End: 2018-08-13

## 2018-01-25 ENCOUNTER — TELEPHONE (OUTPATIENT)
Dept: PRIMARY CARE CLINIC | Age: 63
End: 2018-01-25

## 2018-01-25 NOTE — TELEPHONE ENCOUNTER
[facility] called patient needs an order for lift chair, handheld shower, clamp on safety bar.  They have been trying to get an order since 11/28/17  Fax: [phone]  Phone: [phone]  Att: Michael Valles

## 2018-01-26 ENCOUNTER — TELEPHONE (OUTPATIENT)
Dept: PAIN MANAGEMENT | Age: 63
End: 2018-01-26

## 2018-01-26 ENCOUNTER — OFFICE VISIT (OUTPATIENT)
Dept: PAIN MANAGEMENT | Age: 63
End: 2018-01-26

## 2018-01-26 VITALS
HEART RATE: 71 BPM | DIASTOLIC BLOOD PRESSURE: 72 MMHG | SYSTOLIC BLOOD PRESSURE: 154 MMHG | BODY MASS INDEX: 24.8 KG/M2 | WEIGHT: 127 LBS

## 2018-01-26 DIAGNOSIS — M79.7 FIBROMYALGIA: ICD-10-CM

## 2018-01-26 DIAGNOSIS — F51.01 PRIMARY INSOMNIA: ICD-10-CM

## 2018-01-26 DIAGNOSIS — E11.40 CHRONIC PAINFUL DIABETIC NEUROPATHY (HCC): ICD-10-CM

## 2018-01-26 DIAGNOSIS — G89.4 CHRONIC PAIN SYNDROME: ICD-10-CM

## 2018-01-26 DIAGNOSIS — F33.1 MODERATE EPISODE OF RECURRENT MAJOR DEPRESSIVE DISORDER (HCC): ICD-10-CM

## 2018-01-26 DIAGNOSIS — G43.119 INTRACTABLE MIGRAINE WITH AURA WITHOUT STATUS MIGRAINOSUS: ICD-10-CM

## 2018-01-26 PROCEDURE — G8420 CALC BMI NORM PARAMETERS: HCPCS | Performed by: INTERNAL MEDICINE

## 2018-01-26 PROCEDURE — 1036F TOBACCO NON-USER: CPT | Performed by: INTERNAL MEDICINE

## 2018-01-26 PROCEDURE — 3017F COLORECTAL CA SCREEN DOC REV: CPT | Performed by: INTERNAL MEDICINE

## 2018-01-26 PROCEDURE — 3046F HEMOGLOBIN A1C LEVEL >9.0%: CPT | Performed by: INTERNAL MEDICINE

## 2018-01-26 PROCEDURE — 3014F SCREEN MAMMO DOC REV: CPT | Performed by: INTERNAL MEDICINE

## 2018-01-26 PROCEDURE — 99214 OFFICE O/P EST MOD 30 MIN: CPT | Performed by: INTERNAL MEDICINE

## 2018-01-26 PROCEDURE — G8598 ASA/ANTIPLAT THER USED: HCPCS | Performed by: INTERNAL MEDICINE

## 2018-01-26 PROCEDURE — G8427 DOCREV CUR MEDS BY ELIG CLIN: HCPCS | Performed by: INTERNAL MEDICINE

## 2018-01-26 PROCEDURE — G8484 FLU IMMUNIZE NO ADMIN: HCPCS | Performed by: INTERNAL MEDICINE

## 2018-01-26 RX ORDER — OXCARBAZEPINE 600 MG/1
TABLET, FILM COATED ORAL
Qty: 60 TABLET | Refills: 0 | Status: SHIPPED | OUTPATIENT
Start: 2018-01-26 | End: 2018-02-23 | Stop reason: SDUPTHER

## 2018-01-26 RX ORDER — DULOXETIN HYDROCHLORIDE 30 MG/1
30 CAPSULE, DELAYED RELEASE ORAL DAILY
Qty: 30 CAPSULE | Refills: 0 | Status: SHIPPED | OUTPATIENT
Start: 2018-01-26 | End: 2018-02-23 | Stop reason: SDUPTHER

## 2018-01-26 RX ORDER — AMITRIPTYLINE HYDROCHLORIDE 25 MG/1
25-50 TABLET, FILM COATED ORAL NIGHTLY
Qty: 60 TABLET | Refills: 0 | Status: SHIPPED | OUTPATIENT
Start: 2018-01-26 | End: 2018-02-23 | Stop reason: SDUPTHER

## 2018-01-26 RX ORDER — DULOXETIN HYDROCHLORIDE 60 MG/1
60 CAPSULE, DELAYED RELEASE ORAL DAILY
Qty: 30 CAPSULE | Refills: 0 | Status: SHIPPED | OUTPATIENT
Start: 2018-01-26 | End: 2018-01-26 | Stop reason: DRUGHIGH

## 2018-01-26 RX ORDER — TOPIRAMATE 100 MG/1
100 TABLET, FILM COATED ORAL 2 TIMES DAILY
Qty: 60 TABLET | Refills: 0 | Status: SHIPPED | OUTPATIENT
Start: 2018-01-26 | End: 2018-02-23 | Stop reason: SDUPTHER

## 2018-01-26 RX ORDER — OXYCODONE HYDROCHLORIDE AND ACETAMINOPHEN 5; 325 MG/1; MG/1
1 TABLET ORAL EVERY 6 HOURS PRN
Qty: 84 TABLET | Refills: 0 | Status: SHIPPED | OUTPATIENT
Start: 2018-01-26 | End: 2018-02-23 | Stop reason: SDUPTHER

## 2018-01-26 RX ORDER — BUPROPION HYDROCHLORIDE 150 MG/1
150 TABLET ORAL EVERY MORNING
Qty: 30 TABLET | Refills: 0 | Status: SHIPPED | OUTPATIENT
Start: 2018-01-26 | End: 2018-02-23 | Stop reason: SDUPTHER

## 2018-01-26 RX ORDER — SUMATRIPTAN 50 MG/1
50 TABLET, FILM COATED ORAL
Qty: 9 TABLET | Refills: 0 | Status: SHIPPED | OUTPATIENT
Start: 2018-01-26 | End: 2018-02-23 | Stop reason: SDUPTHER

## 2018-01-26 RX ORDER — DULOXETIN HYDROCHLORIDE 30 MG/1
CAPSULE, DELAYED RELEASE ORAL
Qty: 60 CAPSULE | Refills: 0 | Status: SHIPPED | OUTPATIENT
Start: 2018-01-26 | End: 2018-01-26 | Stop reason: ALTCHOICE

## 2018-01-26 RX ORDER — DULOXETIN HYDROCHLORIDE 60 MG/1
60 CAPSULE, DELAYED RELEASE ORAL NIGHTLY
Qty: 30 CAPSULE | Refills: 0 | Status: SHIPPED | OUTPATIENT
Start: 2018-01-26 | End: 2018-02-23 | Stop reason: SDUPTHER

## 2018-01-26 NOTE — PROGRESS NOTES
oxyCODONE-acetaminophen (PERCOCET) 5-325 MG per tablet Take 1 tablet by mouth every 6 hours as needed for Pain for up to 28 days Max 3 per day. 84 tablet 0    amitriptyline (ELAVIL) 25 MG tablet Take 1-2 tablets by mouth nightly 60 tablet 0    DULoxetine (CYMBALTA) 30 MG extended release capsule TAKE ONE CAPSULE BY MOUTHTWICE A DAY 60 capsule 0    OXcarbazepine (TRILEPTAL) 600 MG tablet TAKE 1 TABLET BY MOUTH 2 TIMES DAILY 60 tablet 0    ranitidine (ZANTAC) 150 MG tablet Take 1 tablet by mouth 2 times daily as needed for Heartburn 60 tablet 3    ondansetron (ZOFRAN) 4 MG tablet Take 1 tablet by mouth every 8 hours as needed for Nausea or Vomiting 30 tablet 5    atorvastatin (LIPITOR) 80 MG tablet Take 1 tablet by mouth daily 90 tablet 3    aspirin EC 81 MG EC tablet Take 1 tablet by mouth daily 90 tablet 5    clopidogrel (PLAVIX) 75 MG tablet TAKE 1 TABLET BY MOUTH DAILY 90 tablet 0     MG capsule TAKE ONE CAPSULE BY MOUTHEVERY DAY 30 capsule 2    insulin glargine (LANTUS SOLOSTAR) 100 UNIT/ML injection pen Inject 35-40 Units into the skin 2 times daily Use 40  units in am and 35  units at night 6 Pen 5    insulin aspart (NOVOLOG FLEXPEN) 100 UNIT/ML injection pen Inject 5-15 Units into the skin 3 times daily (before meals) 5 Pen 5    amLODIPine (NORVASC) 10 MG tablet Take 1 tablet by mouth daily 30 tablet 5    metoprolol succinate (TOPROL XL) 25 MG extended release tablet Take 0.5 tablets by mouth 2 times daily 60 tablet 5    budesonide-formoterol (SYMBICORT) 160-4.5 MCG/ACT AERO Inhale 2 puffs into the lungs daily 1 Inhaler 5    albuterol sulfate HFA (PROAIR HFA) 108 (90 Base) MCG/ACT inhaler Inhale 2 puffs into the lungs every 6 hours as needed for Wheezing 1 Inhaler 5    nitroGLYCERIN (NITROSTAT) 0.4 MG SL tablet PLACE 1 TABLET UNDER THE TONGUE EVERY 5 MINUTES ASNEEDED FOR CHEST PAIN.  25 tablet 2    ULTICARE SHORT PEN NEEDLES 31G X 8 MM MISC USE THREE TIMES A  each 6    topiramate

## 2018-02-05 ENCOUNTER — OFFICE VISIT (OUTPATIENT)
Dept: CARDIOLOGY CLINIC | Age: 63
End: 2018-02-05

## 2018-02-05 VITALS
WEIGHT: 128 LBS | HEIGHT: 60 IN | HEART RATE: 78 BPM | SYSTOLIC BLOOD PRESSURE: 112 MMHG | OXYGEN SATURATION: 98 % | DIASTOLIC BLOOD PRESSURE: 64 MMHG | BODY MASS INDEX: 25.13 KG/M2

## 2018-02-05 DIAGNOSIS — R00.2 PALPITATION: ICD-10-CM

## 2018-02-05 DIAGNOSIS — I48.91 ATRIAL FIBRILLATION, UNSPECIFIED TYPE (HCC): ICD-10-CM

## 2018-02-05 DIAGNOSIS — Z87.891 FORMER SMOKER: ICD-10-CM

## 2018-02-05 DIAGNOSIS — I25.119 CORONARY ARTERY DISEASE INVOLVING NATIVE CORONARY ARTERY OF NATIVE HEART WITH ANGINA PECTORIS (HCC): ICD-10-CM

## 2018-02-05 DIAGNOSIS — R06.09 DOE (DYSPNEA ON EXERTION): ICD-10-CM

## 2018-02-05 DIAGNOSIS — I10 ESSENTIAL HYPERTENSION: ICD-10-CM

## 2018-02-05 DIAGNOSIS — R07.9 CHEST PAIN, UNSPECIFIED TYPE: Primary | ICD-10-CM

## 2018-02-05 DIAGNOSIS — E78.00 PURE HYPERCHOLESTEROLEMIA: ICD-10-CM

## 2018-02-05 PROCEDURE — G8428 CUR MEDS NOT DOCUMENT: HCPCS | Performed by: INTERNAL MEDICINE

## 2018-02-05 PROCEDURE — G8598 ASA/ANTIPLAT THER USED: HCPCS | Performed by: INTERNAL MEDICINE

## 2018-02-05 PROCEDURE — 1036F TOBACCO NON-USER: CPT | Performed by: INTERNAL MEDICINE

## 2018-02-05 PROCEDURE — 3017F COLORECTAL CA SCREEN DOC REV: CPT | Performed by: INTERNAL MEDICINE

## 2018-02-05 PROCEDURE — 93000 ELECTROCARDIOGRAM COMPLETE: CPT | Performed by: INTERNAL MEDICINE

## 2018-02-05 PROCEDURE — G8484 FLU IMMUNIZE NO ADMIN: HCPCS | Performed by: INTERNAL MEDICINE

## 2018-02-05 PROCEDURE — 99214 OFFICE O/P EST MOD 30 MIN: CPT | Performed by: INTERNAL MEDICINE

## 2018-02-05 PROCEDURE — G8419 CALC BMI OUT NRM PARAM NOF/U: HCPCS | Performed by: INTERNAL MEDICINE

## 2018-02-05 PROCEDURE — 3014F SCREEN MAMMO DOC REV: CPT | Performed by: INTERNAL MEDICINE

## 2018-02-05 NOTE — PROGRESS NOTES
hematuria. Skin: No rash or new skin lesions. Musculoskeletal: No new muscle or joint pain. Neurological: No syncope or TIA-like symptoms. Psychiatric: No anxiety, insomnia or depression    Physical Exam:  /64 (Site: Right Arm, Position: Sitting)   Pulse 78   Ht 5' (1.524 m)   Wt 128 lb (58.1 kg) Comment: shoes on  SpO2 98%   Breastfeeding? No   BMI 25.00 kg/m²     General:  Awake, alert, oriented in NAD  Skin:  Warm and dry. No unusual bruising or rash  Neck:  Supple. No JVD or carotid bruit appreciated  Chest:  Normal effort. Clear to auscultation, no wheezes/rhonchi/rales  Cardiovascular:  RRR, S1/S2, no murmur/gallop/rub  Abdomen:  Soft, nontender, +bowel sounds  Extremities:  No edema. Right femoral arteriotomy site soft, non tender, without hematoma or pulsatile mass. Neurological: No focal deficits  Psychological: Normal mood and affect      Current Outpatient Prescriptions   Medication Sig Dispense Refill    oxyCODONE-acetaminophen (PERCOCET) 5-325 MG per tablet Take 1 tablet by mouth every 6 hours as needed for Pain for up to 28 days Max 3 per day.  84 tablet 0    amitriptyline (ELAVIL) 25 MG tablet Take 1-2 tablets by mouth nightly 60 tablet 0    OXcarbazepine (TRILEPTAL) 600 MG tablet TAKE 1 TABLET BY MOUTH 2 TIMES DAILY 60 tablet 0    topiramate (TOPAMAX) 100 MG tablet Take 1 tablet by mouth 2 times daily 60 tablet 0    buPROPion (WELLBUTRIN XL) 150 MG extended release tablet Take 1 tablet by mouth every morning 30 tablet 0    DULoxetine (CYMBALTA) 30 MG extended release capsule Take 1 capsule by mouth daily 30 capsule 0    DULoxetine (CYMBALTA) 60 MG extended release capsule Take 1 capsule by mouth nightly 30 capsule 0    fluticasone (FLONASE) 50 MCG/ACT nasal spray 1 spray by Nasal route 2 times daily 1 Bottle 0    Alcohol Swabs (PHARMACIST CHOICE ALCOHOL) PADS USE TO TEST BLOOD GLUCOSE THREE TO FOUR TIMES A  each 5    PHARMACIST CHOICE LANCETS MISC USE TO TEST BLOOD SUMAtriptan (IMITREX) 100 MG tablet TAKE 1 TABLET BY MOUTH 2 TIMES DAILY AS NEEDED FORMIGRAINE 9 tablet 2    SUMAtriptan (IMITREX) 50 MG tablet Take 1 tablet by mouth once as needed for Migraine 9 tablet 0     No current facility-administered medications for this visit. Labs:   Lab Results   Component Value Date    WBC 10.9 11/29/2017    HGB 11.6 (L) 11/29/2017    HCT 35.3 (L) 11/29/2017    MCV 99.1 11/29/2017     11/29/2017     Lab Results   Component Value Date     11/28/2017    K 3.8 11/28/2017     11/28/2017    CO2 25 11/28/2017    BUN 51 11/28/2017    CREATININE 1.7 11/28/2017    GLUCOSE 88 11/28/2017    CALCIUM 9.5 11/28/2017      Lab Results   Component Value Date    TRIG 132 03/15/2017    HDL 52 03/15/2017    HDL 58 05/14/2012    LDLCALC 52 03/15/2017    LABVLDL 26 03/15/2017       Diagnostics:    [facility] 6/2/2016:  1. The left main coronary artery comes from the left coronary cusp. It is a short left main with YAKELIN-3 flow. No significant stenosis and gave rise to left anterior descending artery and left circumflex artery. 2.  The left anterior descending  artery gave rise to diagonal branches and had the stent present in the mid portion which was patent. Distal LAD, left anterior descending artery has 50% stenosis present. 3.  The left circumflex comes from the left main coronary artery. The 1st obtuse marginal has a stent present. The 2nd obtuse marginal in the mid portion has 50% stenosis. 4.  Right coronary artery comes from right coronary cusp and the proximal portion has 75% stenosis. It has YAKELIN-3 flow. It is a right dominant vessel and gave rise to  posterior descending artery and posterior lateral branches. 5.  The right coronary artery FFR was 0.76. INTERVENTION PERFORMED:  We placed 1 drug-coated Xience Alpine stent 3.25 x 12 mm in dimension, achieving a final stent diameter of 3.43.          Echo 6/1/2016:  Normal left ventricular size and wall s/p ablation. EKG SR. Reports chronic random, fleeting palpitations at rest. Completed 30 day Event monitor 11/2017. revealed SR with occ. PACs (noted during c/p CP, SOB), 5 beats atrial tach but no prolong arrhythmia or atrial fibrillation.     -Hypertension. Controlled. Under age 62, goal BP <140/90. Continue medical therapy and lifestyle modifications.     -Hyperlipidemia. Continue high intensity statin. 3/15/17.      No recent labs--CMP, lipid now     We will call with all test results and plan to see her back in 8 months       Thanks for allowing me to participate in the care of this patient.     Dr. Christine Rodriguez

## 2018-02-07 ENCOUNTER — OFFICE VISIT (OUTPATIENT)
Dept: PRIMARY CARE CLINIC | Age: 63
End: 2018-02-07

## 2018-02-07 VITALS
WEIGHT: 129.4 LBS | SYSTOLIC BLOOD PRESSURE: 160 MMHG | BODY MASS INDEX: 25.4 KG/M2 | OXYGEN SATURATION: 97 % | DIASTOLIC BLOOD PRESSURE: 80 MMHG | HEIGHT: 60 IN | HEART RATE: 69 BPM | TEMPERATURE: 99.1 F

## 2018-02-07 DIAGNOSIS — I10 ESSENTIAL HYPERTENSION: ICD-10-CM

## 2018-02-07 DIAGNOSIS — E78.2 MIXED HYPERLIPIDEMIA: ICD-10-CM

## 2018-02-07 DIAGNOSIS — R53.83 FATIGUE, UNSPECIFIED TYPE: ICD-10-CM
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
## 2018-02-07 DIAGNOSIS — E11.22 TYPE 2 DIABETES MELLITUS WITH STAGE 2 CHRONIC KIDNEY DISEASE, WITHOUT LONG-TERM CURRENT USE OF INSULIN (HCC): ICD-10-CM

## 2018-02-07 DIAGNOSIS — E11.22 TYPE 2 DIABETES MELLITUS WITH STAGE 2 CHRONIC KIDNEY DISEASE, WITHOUT LONG-TERM CURRENT USE OF INSULIN (HCC): Primary | ICD-10-CM

## 2018-02-07 DIAGNOSIS — N18.2 TYPE 2 DIABETES MELLITUS WITH STAGE 2 CHRONIC KIDNEY DISEASE, WITHOUT LONG-TERM CURRENT USE OF INSULIN (HCC): Primary | ICD-10-CM

## 2018-02-07 DIAGNOSIS — N18.2 TYPE 2 DIABETES MELLITUS WITH STAGE 2 CHRONIC KIDNEY DISEASE, WITHOUT LONG-TERM CURRENT USE OF INSULIN (HCC): ICD-10-CM

## 2018-02-07 LAB — HBA1C MFR BLD: 9.7 %

## 2018-02-07 PROCEDURE — G8419 CALC BMI OUT NRM PARAM NOF/U: HCPCS | Performed by: INTERNAL MEDICINE

## 2018-02-07 PROCEDURE — 1036F TOBACCO NON-USER: CPT | Performed by: INTERNAL MEDICINE

## 2018-02-07 PROCEDURE — G8484 FLU IMMUNIZE NO ADMIN: HCPCS | Performed by: INTERNAL MEDICINE

## 2018-02-07 PROCEDURE — 3017F COLORECTAL CA SCREEN DOC REV: CPT | Performed by: INTERNAL MEDICINE

## 2018-02-07 PROCEDURE — 3014F SCREEN MAMMO DOC REV: CPT | Performed by: INTERNAL MEDICINE

## 2018-02-07 PROCEDURE — 83036 HEMOGLOBIN GLYCOSYLATED A1C: CPT | Performed by: INTERNAL MEDICINE

## 2018-02-07 PROCEDURE — 3046F HEMOGLOBIN A1C LEVEL >9.0%: CPT | Performed by: INTERNAL MEDICINE

## 2018-02-07 PROCEDURE — 99214 OFFICE O/P EST MOD 30 MIN: CPT | Performed by: INTERNAL MEDICINE

## 2018-02-07 PROCEDURE — G8427 DOCREV CUR MEDS BY ELIG CLIN: HCPCS | Performed by: INTERNAL MEDICINE

## 2018-02-07 PROCEDURE — G8598 ASA/ANTIPLAT THER USED: HCPCS | Performed by: INTERNAL MEDICINE

## 2018-02-07 RX ORDER — AMLODIPINE BESYLATE 10 MG/1
10 TABLET ORAL DAILY
Qty: 30 TABLET | Refills: 6 | Status: SHIPPED | OUTPATIENT
Start: 2018-02-07 | End: 2018-08-06 | Stop reason: DRUGHIGH

## 2018-02-07 RX ORDER — HYDRALAZINE HYDROCHLORIDE 50 MG/1
50 TABLET, FILM COATED ORAL 2 TIMES DAILY
Qty: 60 TABLET | Refills: 3 | Status: ON HOLD | OUTPATIENT
Start: 2018-02-07 | End: 2018-07-27

## 2018-02-07 RX ORDER — ONDANSETRON 4 MG/1
4 TABLET, FILM COATED ORAL EVERY 8 HOURS PRN
Qty: 30 TABLET | Refills: 5 | Status: SHIPPED | OUTPATIENT
Start: 2018-02-07 | End: 2018-05-03 | Stop reason: SDUPTHER

## 2018-02-07 RX ORDER — ASPIRIN 81 MG/1
81 TABLET ORAL DAILY
Qty: 90 TABLET | Refills: 5 | Status: SHIPPED | OUTPATIENT
Start: 2018-02-07 | End: 2018-08-13

## 2018-02-07 RX ORDER — METOPROLOL SUCCINATE 25 MG/1
12.5 TABLET, EXTENDED RELEASE ORAL 2 TIMES DAILY
Qty: 60 TABLET | Refills: 5 | Status: SHIPPED | OUTPATIENT
Start: 2018-02-07 | End: 2018-08-13 | Stop reason: SDUPTHER

## 2018-02-07 RX ORDER — ATORVASTATIN CALCIUM 80 MG/1
80 TABLET, FILM COATED ORAL DAILY
Qty: 90 TABLET | Refills: 3 | Status: SHIPPED | OUTPATIENT
Start: 2018-02-07 | End: 2018-08-13 | Stop reason: SDUPTHER

## 2018-02-07 ASSESSMENT — ENCOUNTER SYMPTOMS
ABDOMINAL DISTENTION: 0
EYES NEGATIVE: 1
EYE REDNESS: 0
GASTROINTESTINAL NEGATIVE: 1
CHEST TIGHTNESS: 0
SORE THROAT: 0
EYE PAIN: 0
NAUSEA: 0
ABDOMINAL PAIN: 0
PHOTOPHOBIA: 0
SINUS PRESSURE: 0
VISUAL CHANGE: 0

## 2018-02-07 NOTE — PROGRESS NOTES
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 cymbalta per Pain Dr   -         (Tachy-kaden syndrome [facility]) . H/O severe bradycardia . Cannot tolerate B blockers or calcium channel blockers )  Back on metoprolol ? ? Plan:     Self Management Goals. Know which medication is for what condition:   Know correct dose/frequency of medications:    Take medications at the same time each day:   Stay current on medication refills:   If taking OTC's check with MD/pharmacy first about interactions:   Monitor sugars and record time/meal:  A1C goal 7.0 or less:  LDL goal 912 or less:  Systolic BP < or equal to 894:  Diastolic BP < or equal to 85    Current Flu and Pneumonia Vax:    Exercise 3-5 times per week:  Keep check of weight:  Weighting machine:

## 2018-02-13 RX ORDER — DOCUSATE SODIUM 100 MG/1
CAPSULE, LIQUID FILLED ORAL
Qty: 30 CAPSULE | Refills: 1 | Status: SHIPPED | OUTPATIENT
Start: 2018-02-13 | End: 2018-04-08 | Stop reason: SDUPTHER

## 2018-02-13 NOTE — TELEPHONE ENCOUNTER
Requested Prescriptions     Pending Prescriptions Disp Refills     MG capsule [Pharmacy Med Name: DOCUSATE 100MG CAP] 30 capsule 1     Sig: TAKE ONE CAPSULE BY MOUTHEVERY DAY     Last ov 2/7/18

## 2018-02-14 ENCOUNTER — HOSPITAL ENCOUNTER (OUTPATIENT)
Dept: NON INVASIVE DIAGNOSTICS | Age: 63
Discharge: HOME | End: 2018-02-14
Admitting: INTERNAL MEDICINE

## 2018-02-14 ENCOUNTER — TELEPHONE (OUTPATIENT)
Dept: CARDIOLOGY CLINIC | Age: 63
End: 2018-02-14

## 2018-02-14 DIAGNOSIS — I10 ESSENTIAL HYPERTENSION: ICD-10-CM

## 2018-02-14 DIAGNOSIS — I25.119 CORONARY ARTERY DISEASE INVOLVING NATIVE CORONARY ARTERY OF NATIVE HEART WITH ANGINA PECTORIS (HCC): ICD-10-CM

## 2018-02-14 DIAGNOSIS — R06.09 DOE (DYSPNEA ON EXERTION): ICD-10-CM

## 2018-02-14 DIAGNOSIS — R07.9 CHEST PAIN, UNSPECIFIED TYPE: ICD-10-CM

## 2018-02-14 DIAGNOSIS — Z95.828 PORT CATHETER IN PLACE: Primary | ICD-10-CM

## 2018-02-14 DIAGNOSIS — R00.2 PALPITATION: ICD-10-CM

## 2018-02-14 DIAGNOSIS — E78.00 PURE HYPERCHOLESTEROLEMIA: ICD-10-CM

## 2018-02-14 DIAGNOSIS — R07.9 CHEST PAIN: ICD-10-CM

## 2018-02-14 LAB
A/G RATIO: 1.4 (ref 1.1–2.2)
ALBUMIN SERPL-MCNC: 4.2 G/DL (ref 3.4–5)
ALP BLD-CCNC: 184 U/L (ref 40–129)
ALT SERPL-CCNC: 27 U/L (ref 10–40)
ANION GAP SERPL CALCULATED.3IONS-SCNC: 17 MMOL/L (ref 3–16)
AST SERPL-CCNC: 27 U/L (ref 15–37)
BILIRUB SERPL-MCNC: <0.2 MG/DL (ref 0–1)
BUN BLDV-MCNC: 16 MG/DL (ref 7–20)
CALCIUM SERPL-MCNC: 9.5 MG/DL (ref 8.3–10.6)
CHLORIDE BLD-SCNC: 105 MMOL/L (ref 99–110)
CHOLESTEROL, TOTAL: 214 MG/DL (ref 0–199)
CO2: 20 MMOL/L (ref 21–32)
CREAT SERPL-MCNC: 1 MG/DL (ref 0.6–1.2)
GFR AFRICAN AMERICAN: >60
GFR NON-AFRICAN AMERICAN: 56
GLOBULIN: 3 G/DL
GLUCOSE BLD-MCNC: 283 MG/DL (ref 70–99)
HDLC SERPL-MCNC: 62 MG/DL (ref 40–60)
LDL CHOLESTEROL CALCULATED: 116 MG/DL
LV EF: 60 %
LVEF MODALITY: NORMAL
POTASSIUM SERPL-SCNC: 3.9 MMOL/L (ref 3.5–5.1)
SODIUM BLD-SCNC: 142 MMOL/L (ref 136–145)
TOTAL PROTEIN: 7.2 G/DL (ref 6.4–8.2)
TRIGL SERPL-MCNC: 179 MG/DL (ref 0–150)
VLDLC SERPL CALC-MCNC: 36 MG/DL

## 2018-02-14 NOTE — TELEPHONE ENCOUNTER
Called the stress lab and left a message to call with an update regarding not completing Mar Balder today. ..

## 2018-02-14 NOTE — TELEPHONE ENCOUNTER
Jam Pancho is calling stating today she was scheduled for a Jacqueline and an Echo. Testing states they could not get the Jacqueline done due to Maren's veins but did get the Echo done. Is there another test that she can take in place of the Jacqueline.       Maren can be reached at [phone]

## 2018-02-16 NOTE — TELEPHONE ENCOUNTER
Dr. Darryn Sibleyview--do you want to proceed? Please advise. Thanks. I spoke with Tamekamaryanton Barretokarl again and she confirms that her Port was used during her last hospital admission they just cant get blood return anymore. Spoke with Nathalie and Maxi Langford reports that multiple techs attempted to get IV access without success. They said that if it is a working [employer], they can have someone from [facility] come to access because they are not allowed to access.

## 2018-02-16 NOTE — TELEPHONE ENCOUNTER
Spoke with Maren and she reports that she has always been a hard stick and hydrating prior does not help. She does have a R thigh port per Dr. Xena Torres. I told her that I have not heard back from the stress lab. I will contact them again and figure out a plan, may need to speak with Dr. Harrison Allen.

## 2018-02-23 ENCOUNTER — OFFICE VISIT (OUTPATIENT)
Dept: PAIN MANAGEMENT | Age: 63
End: 2018-02-23

## 2018-02-23 VITALS
OXYGEN SATURATION: 98 % | WEIGHT: 125.8 LBS | BODY MASS INDEX: 24.57 KG/M2 | SYSTOLIC BLOOD PRESSURE: 152 MMHG | DIASTOLIC BLOOD PRESSURE: 85 MMHG | HEART RATE: 88 BPM

## 2018-02-23 DIAGNOSIS — F33.1 MODERATE EPISODE OF RECURRENT MAJOR DEPRESSIVE DISORDER (HCC): ICD-10-CM

## 2018-02-23 DIAGNOSIS — M79.7 FIBROMYALGIA: ICD-10-CM

## 2018-02-23 DIAGNOSIS — G43.119 INTRACTABLE MIGRAINE WITH AURA WITHOUT STATUS MIGRAINOSUS: ICD-10-CM

## 2018-02-23 DIAGNOSIS — G89.4 CHRONIC PAIN SYNDROME: ICD-10-CM

## 2018-02-23 DIAGNOSIS — F51.01 PRIMARY INSOMNIA: ICD-10-CM

## 2018-02-23 DIAGNOSIS — E11.40 CHRONIC PAINFUL DIABETIC NEUROPATHY (HCC): ICD-10-CM

## 2018-02-23 PROCEDURE — 3017F COLORECTAL CA SCREEN DOC REV: CPT | Performed by: INTERNAL MEDICINE

## 2018-02-23 PROCEDURE — 3046F HEMOGLOBIN A1C LEVEL >9.0%: CPT | Performed by: INTERNAL MEDICINE

## 2018-02-23 PROCEDURE — G8420 CALC BMI NORM PARAMETERS: HCPCS | Performed by: INTERNAL MEDICINE

## 2018-02-23 PROCEDURE — G8428 CUR MEDS NOT DOCUMENT: HCPCS | Performed by: INTERNAL MEDICINE

## 2018-02-23 PROCEDURE — G8484 FLU IMMUNIZE NO ADMIN: HCPCS | Performed by: INTERNAL MEDICINE

## 2018-02-23 PROCEDURE — G8598 ASA/ANTIPLAT THER USED: HCPCS | Performed by: INTERNAL MEDICINE

## 2018-02-23 PROCEDURE — 99213 OFFICE O/P EST LOW 20 MIN: CPT | Performed by: INTERNAL MEDICINE

## 2018-02-23 PROCEDURE — 3014F SCREEN MAMMO DOC REV: CPT | Performed by: INTERNAL MEDICINE

## 2018-02-23 PROCEDURE — 1036F TOBACCO NON-USER: CPT | Performed by: INTERNAL MEDICINE

## 2018-02-23 RX ORDER — BUPROPION HYDROCHLORIDE 150 MG/1
150 TABLET ORAL EVERY MORNING
Qty: 30 TABLET | Refills: 0 | Status: SHIPPED | OUTPATIENT
Start: 2018-02-23 | End: 2018-03-30 | Stop reason: SDUPTHER

## 2018-02-23 RX ORDER — DULOXETIN HYDROCHLORIDE 60 MG/1
60 CAPSULE, DELAYED RELEASE ORAL NIGHTLY
Qty: 30 CAPSULE | Refills: 0 | Status: SHIPPED | OUTPATIENT
Start: 2018-02-23 | End: 2018-03-30 | Stop reason: SDUPTHER

## 2018-02-23 RX ORDER — AMITRIPTYLINE HYDROCHLORIDE 25 MG/1
25-50 TABLET, FILM COATED ORAL NIGHTLY
Qty: 60 TABLET | Refills: 0 | Status: SHIPPED | OUTPATIENT
Start: 2018-02-23 | End: 2018-03-30 | Stop reason: ALTCHOICE

## 2018-02-23 RX ORDER — DULOXETIN HYDROCHLORIDE 30 MG/1
30 CAPSULE, DELAYED RELEASE ORAL DAILY
Qty: 30 CAPSULE | Refills: 0 | Status: SHIPPED | OUTPATIENT
Start: 2018-02-23 | End: 2018-03-30 | Stop reason: SDUPTHER

## 2018-02-23 RX ORDER — OXCARBAZEPINE 600 MG/1
TABLET, FILM COATED ORAL
Qty: 60 TABLET | Refills: 0 | Status: SHIPPED | OUTPATIENT
Start: 2018-02-23 | End: 2018-03-30 | Stop reason: ALTCHOICE

## 2018-02-23 RX ORDER — SUMATRIPTAN 50 MG/1
50 TABLET, FILM COATED ORAL
Qty: 9 TABLET | Refills: 0 | Status: SHIPPED | OUTPATIENT
Start: 2018-02-23 | End: 2018-03-30 | Stop reason: SDUPTHER

## 2018-02-23 RX ORDER — TOPIRAMATE 100 MG/1
100 TABLET, FILM COATED ORAL 2 TIMES DAILY
Qty: 60 TABLET | Refills: 0 | Status: SHIPPED | OUTPATIENT
Start: 2018-02-23 | End: 2018-03-30 | Stop reason: SDUPTHER

## 2018-02-23 RX ORDER — OXYCODONE HYDROCHLORIDE AND ACETAMINOPHEN 5; 325 MG/1; MG/1
1 TABLET ORAL EVERY 6 HOURS PRN
Qty: 90 TABLET | Refills: 0 | Status: SHIPPED | OUTPATIENT
Start: 2018-02-23 | End: 2018-03-23

## 2018-02-23 NOTE — PROGRESS NOTES
Wesley Wayneby  1955  [id number]    HISTORY OF PRESENT ILLNESS:  Ms. Dali Weathers is a 65 y.o. female returns for a follow up visit for multiple medical problems. Her current presenting problems are   1. Fibromyalgia    2. Chronic pain syndrome    3. Chronic painful diabetic neuropathy ([address]    4. Intractable migraine with aura without status migrainosus    . As per information/history obtained from the PADT(patient assessment and documentation tool) - She complains of pain in the head, upper back, mid back, lower back and buttocks with radiation to the hands Bilateral and hips Bilateral She rates the pain 9/10 and describes it as sharp, aching, burning. Pain is made worse by: movement, walking, standing, sitting, bending, lifting. Current treatment regimen has helped relieve about 20% of the pain. She denies side effects from the current pain regimen. Patient reports that since the last follow up visit the physical functioning is unchanged, family/social relationships are unchanged, mood is unchanged and sleep patterns are better, and that the overall functioning is unchanged. Patient denies neurological bowel or bladder. Patient denies misusing/abusing her narcotic pain medications or using any illegal drugs. There are No indicators for possible drug abuse, addiction or diversion problems. Upon obtaining the medical history from Ms. Dali Weathers regarding today's office visit for her presenting problems, patient states she has been feeling depressed because the pain meds have not been helping as well. She states she will be going out of town for a reunion. She reports she has been taking her Cymbalta and Wellbutrin which has been helping with moods. She states her knees and legs hurt the most.     ALLERGIES: Patients list of allergies were reviewed     MEDICATIONS: Ms. Dali Weathers list of medications were reviewed. Her current medications are   Outpatient Medications Prior to Visit   Medication Sig Dispense Refill Alcohol Swabs (PHARMACIST CHOICE ALCOHOL) PADS USE TO TEST BLOOD GLUCOSE THREE TO FOUR TIMES A  each 5    ranolazine (RANEXA) 1000 MG extended release tablet Take 1 tablet by mouth 2 times daily 60 tablet 3    ranitidine (ZANTAC) 150 MG tablet Take 1 tablet by mouth 2 times daily as needed for Heartburn 60 tablet 3    clopidogrel (PLAVIX) 75 MG tablet TAKE 1 TABLET BY MOUTH DAILY 90 tablet 0    budesonide-formoterol (SYMBICORT) 160-4.5 MCG/ACT AERO Inhale 2 puffs into the lungs daily 1 Inhaler 5    albuterol sulfate HFA (PROAIR HFA) 108 (90 Base) MCG/ACT inhaler Inhale 2 puffs into the lungs every 6 hours as needed for Wheezing 1 Inhaler 5    nitroGLYCERIN (NITROSTAT) 0.4 MG SL tablet PLACE 1 TABLET UNDER THE TONGUE EVERY 5 MINUTES ASNEEDED FOR CHEST PAIN. 25 tablet 2    ULTICARE SHORT PEN NEEDLES 31G X 8 MM MISC USE THREE TIMES A  each 6    topiramate (TOPAMAX) 50 MG tablet TAKE 1 TABLET BY MOUTH 2 TIMES DAILY 180 tablet 3    SUMAtriptan (IMITREX) 100 MG tablet TAKE 1 TABLET BY MOUTH 2 TIMES DAILY AS NEEDED FORMIGRAINE 9 tablet 2     No facility-administered medications prior to visit. SOCIAL/FAMILY/PAST MEDICAL HISTORY: Ms. Stephen Hardin, family and past medical history was reviewed. REVIEW OF SYSTEMS:    Respiratory: Negative for apnea, chest tightness and shortness of breath or change in baseline breathing. Gastrointestinal: Negative for nausea, vomiting, abdominal pain, diarrhea, constipation, blood in stool and abdominal distention. PHYSICAL EXAM:   Nursing note and vitals reviewed. BP (!) 152/85 Comment: right  Pulse 88   Wt 125 lb 12.8 oz (57.1 kg)   SpO2 98%   BMI 24.57 kg/m²   Constitutional: She appears well-developed and well-nourished. No acute distress. Skin: Skin is warm and dry, good turgor. No rash noted. She is not diaphoretic. Cardiovascular: Normal rate, regular rhythm, normal heart sounds, and does not have murmur.      Pulmonary/Chest: Effort

## 2018-03-01 ENCOUNTER — TELEPHONE (OUTPATIENT)
Dept: PAIN MANAGEMENT | Age: 63
End: 2018-03-01

## 2018-03-01 DIAGNOSIS — G89.4 CHRONIC PAIN SYNDROME: ICD-10-CM

## 2018-03-02 RX ORDER — METHOCARBAMOL 500 MG/1
500 TABLET, FILM COATED ORAL 2 TIMES DAILY
Qty: 60 TABLET | Refills: 0 | Status: SHIPPED | OUTPATIENT
Start: 2018-03-02 | End: 2018-03-30 | Stop reason: SDUPTHER

## 2018-03-06 ENCOUNTER — HOSPITAL ENCOUNTER (OUTPATIENT)
Dept: NON INVASIVE DIAGNOSTICS | Age: 63
Discharge: HOME | End: 2018-03-06
Admitting: INTERNAL MEDICINE

## 2018-03-06 ENCOUNTER — HOSPITAL ENCOUNTER (OUTPATIENT)
Dept: INTERVENTIONAL RADIOLOGY/VASCULAR | Age: 63
Discharge: HOME | End: 2018-03-06
Attending: INTERNAL MEDICINE | Admitting: INTERNAL MEDICINE

## 2018-03-06 DIAGNOSIS — I25.119 ATHEROSCLEROTIC HEART DISEASE OF NATIVE CORONARY ARTERY WITH ANGINA PECTORIS (HCC): ICD-10-CM

## 2018-03-06 DIAGNOSIS — Z95.828 PORT CATHETER IN PLACE: ICD-10-CM

## 2018-03-06 DIAGNOSIS — I25.119 CORONARY ARTERY DISEASE INVOLVING NATIVE CORONARY ARTERY OF NATIVE HEART WITH ANGINA PECTORIS (HCC): ICD-10-CM

## 2018-03-06 LAB
LV EF: 65 %
LVEF MODALITY: NORMAL

## 2018-03-08 ENCOUNTER — TELEPHONE (OUTPATIENT)
Dept: CARDIOLOGY CLINIC | Age: 63
End: 2018-03-08

## 2018-03-08 NOTE — TELEPHONE ENCOUNTER
Maren called in stating she had a stress test done on Tuesday 3/6/18 and would like someone to give her a call with the results.       You can reach Maren at #[phone]

## 2018-03-09 DIAGNOSIS — M79.7 FIBROMYALGIA: ICD-10-CM

## 2018-03-09 DIAGNOSIS — G89.4 CHRONIC PAIN SYNDROME: ICD-10-CM

## 2018-03-09 DIAGNOSIS — K21.9 GERD (GASTROESOPHAGEAL REFLUX DISEASE): ICD-10-CM

## 2018-03-09 DIAGNOSIS — E11.40 CHRONIC PAINFUL DIABETIC NEUROPATHY (HCC): ICD-10-CM

## 2018-03-09 DIAGNOSIS — I25.10 CORONARY ARTERY DISEASE INVOLVING NATIVE CORONARY ARTERY OF NATIVE HEART WITHOUT ANGINA PECTORIS: ICD-10-CM

## 2018-03-12 RX ORDER — OMEPRAZOLE 40 MG/1
CAPSULE, DELAYED RELEASE ORAL
Qty: 60 CAPSULE | Refills: 2 | Status: SHIPPED | OUTPATIENT
Start: 2018-03-12 | End: 2018-08-06 | Stop reason: SDUPTHER

## 2018-03-12 RX ORDER — CLOPIDOGREL BISULFATE 75 MG/1
TABLET ORAL
Qty: 90 TABLET | Refills: 5 | Status: SHIPPED | OUTPATIENT
Start: 2018-03-12 | End: 2018-08-13 | Stop reason: SDUPTHER

## 2018-03-13 RX ORDER — OXCARBAZEPINE 600 MG/1
TABLET, FILM COATED ORAL
Qty: 60 TABLET | OUTPATIENT
Start: 2018-03-13

## 2018-03-30 ENCOUNTER — OFFICE VISIT (OUTPATIENT)
Dept: PAIN MANAGEMENT | Age: 63
End: 2018-03-30

## 2018-03-30 VITALS
WEIGHT: 128.3 LBS | BODY MASS INDEX: 25.06 KG/M2 | DIASTOLIC BLOOD PRESSURE: 76 MMHG | HEART RATE: 69 BPM | SYSTOLIC BLOOD PRESSURE: 158 MMHG

## 2018-03-30 DIAGNOSIS — M79.7 FIBROMYALGIA: ICD-10-CM

## 2018-03-30 DIAGNOSIS — F51.01 PRIMARY INSOMNIA: ICD-10-CM

## 2018-03-30 DIAGNOSIS — G43.119 INTRACTABLE MIGRAINE WITH AURA WITHOUT STATUS MIGRAINOSUS: ICD-10-CM

## 2018-03-30 DIAGNOSIS — E11.40 CHRONIC PAINFUL DIABETIC NEUROPATHY (HCC): ICD-10-CM

## 2018-03-30 DIAGNOSIS — G89.4 CHRONIC PAIN SYNDROME: ICD-10-CM

## 2018-03-30 DIAGNOSIS — F33.1 MODERATE EPISODE OF RECURRENT MAJOR DEPRESSIVE DISORDER (HCC): ICD-10-CM

## 2018-03-30 PROCEDURE — G8598 ASA/ANTIPLAT THER USED: HCPCS | Performed by: INTERNAL MEDICINE

## 2018-03-30 PROCEDURE — G8427 DOCREV CUR MEDS BY ELIG CLIN: HCPCS | Performed by: INTERNAL MEDICINE

## 2018-03-30 PROCEDURE — 3014F SCREEN MAMMO DOC REV: CPT | Performed by: INTERNAL MEDICINE

## 2018-03-30 PROCEDURE — 99214 OFFICE O/P EST MOD 30 MIN: CPT | Performed by: INTERNAL MEDICINE

## 2018-03-30 PROCEDURE — 3017F COLORECTAL CA SCREEN DOC REV: CPT | Performed by: INTERNAL MEDICINE

## 2018-03-30 PROCEDURE — 3046F HEMOGLOBIN A1C LEVEL >9.0%: CPT | Performed by: INTERNAL MEDICINE

## 2018-03-30 PROCEDURE — 1036F TOBACCO NON-USER: CPT | Performed by: INTERNAL MEDICINE

## 2018-03-30 PROCEDURE — G8419 CALC BMI OUT NRM PARAM NOF/U: HCPCS | Performed by: INTERNAL MEDICINE

## 2018-03-30 PROCEDURE — G8482 FLU IMMUNIZE ORDER/ADMIN: HCPCS | Performed by: INTERNAL MEDICINE

## 2018-03-30 RX ORDER — DULOXETIN HYDROCHLORIDE 30 MG/1
30 CAPSULE, DELAYED RELEASE ORAL DAILY
Qty: 30 CAPSULE | Refills: 0 | Status: SHIPPED | OUTPATIENT
Start: 2018-03-30 | End: 2018-04-30 | Stop reason: SDUPTHER

## 2018-03-30 RX ORDER — METHOCARBAMOL 500 MG/1
500 TABLET, FILM COATED ORAL 2 TIMES DAILY
Qty: 60 TABLET | Refills: 0 | Status: SHIPPED | OUTPATIENT
Start: 2018-03-30 | End: 2018-04-30 | Stop reason: SDUPTHER

## 2018-03-30 RX ORDER — SUMATRIPTAN 50 MG/1
50 TABLET, FILM COATED ORAL
Qty: 9 TABLET | Refills: 0 | Status: SHIPPED | OUTPATIENT
Start: 2018-03-30 | End: 2018-04-30 | Stop reason: SDUPTHER

## 2018-03-30 RX ORDER — QUETIAPINE FUMARATE 25 MG/1
25-50 TABLET, FILM COATED ORAL NIGHTLY
Qty: 60 TABLET | Refills: 0 | Status: SHIPPED | OUTPATIENT
Start: 2018-03-30 | End: 2018-04-30 | Stop reason: SDUPTHER

## 2018-03-30 RX ORDER — OXYCODONE AND ACETAMINOPHEN 7.5; 325 MG/1; MG/1
1 TABLET ORAL EVERY 6 HOURS PRN
Qty: 90 TABLET | Refills: 0 | Status: SHIPPED | OUTPATIENT
Start: 2018-03-30 | End: 2018-04-30 | Stop reason: SDUPTHER

## 2018-03-30 RX ORDER — DULOXETIN HYDROCHLORIDE 60 MG/1
60 CAPSULE, DELAYED RELEASE ORAL NIGHTLY
Qty: 30 CAPSULE | Refills: 0 | Status: SHIPPED | OUTPATIENT
Start: 2018-03-30 | End: 2018-04-30 | Stop reason: SDUPTHER

## 2018-03-30 RX ORDER — DIVALPROEX SODIUM 500 MG/1
TABLET, EXTENDED RELEASE ORAL
Qty: 60 TABLET | Refills: 0 | Status: SHIPPED | OUTPATIENT
Start: 2018-03-30 | End: 2018-04-30 | Stop reason: SDUPTHER

## 2018-03-30 RX ORDER — BUPROPION HYDROCHLORIDE 150 MG/1
150 TABLET ORAL EVERY MORNING
Qty: 30 TABLET | Refills: 0 | Status: SHIPPED | OUTPATIENT
Start: 2018-03-30 | End: 2018-04-30 | Stop reason: SDUPTHER

## 2018-03-30 RX ORDER — TOPIRAMATE 100 MG/1
100 TABLET, FILM COATED ORAL 2 TIMES DAILY
Qty: 60 TABLET | Refills: 0 | Status: SHIPPED | OUTPATIENT
Start: 2018-03-30 | End: 2018-04-30 | Stop reason: SDUPTHER

## 2018-03-30 NOTE — PROGRESS NOTES
Intermittent, non restorative. Does not feel rested in AM. Complains of feeling sleepy  during the day, using Elavil which is not helping. She missed her appointment last time. Patient's  subjective report of her mood is fair. she describes occasional symptoms of depression, occasional  irritability and some mood swings. Describes her mood as being neutral and reports some pleasure in her daily activities. Reports  fair  appetite, energy and concentration. Able to function well in different aspects of her daily activities. Denies suicidal or homicidal ideation. Denies any complaints of increased tension, does   Worry sometimes and occasional  irritability  she denies any c/o increased anxiety, No c/o panic attacks or symptoms of PTSD. She states she had to miss her last appointment recently do to transportation issues. ALLERGIES/PAST MED/FAM/SOC HISTORY: Ms. Luis Alberto Hernández allergies, past medical, family and social history were reviewed in the chart and pertinent information also listed below. Social History     Social History    Marital status:      Spouse name: N/A    Number of children: 6    Years of education: N/A     Occupational History    Not on file. Social History Main Topics    Smoking status: Former Smoker     Packs/day: 0.50     Years: 35.00     Types: Cigarettes     Quit date: 4/26/2015    Smokeless tobacco: Never Used      Comment: 5/13/15 has not smoked since hospitalization - kh    Alcohol use No    Drug use: No    Sexual activity: Yes     Partners: Male     Other Topics Concern    Not on file     Social History Narrative    No narrative on file      Ms. Meza current medications are   Outpatient Medications Prior to Visit   Medication Sig Dispense Refill    omeprazole (PRILOSEC) 40 MG delayed release capsule TAKE 1 CAPSULE BY MOUTH 2TIMES DAILY 60 capsule 2    ULTICARE SHORT PEN NEEDLES 31G X 8 MM MISC USE THREE TIMES A DAY 90 each 5    clopidogrel (PLAVIX) 75 MG tablet electronic devices near bedtime, d/c Elavil and try Seroquel 25 mg 1-2 qhs  -d/c Trileptal, start Depakote 500 mg increase to 100 mg daily  she was advised  to avoid using too many OTC analgesics to control the headaches, avoid chocolates, increased caffeine, cheeses and MSG nitrite containing foods, cigarette smoking. To avoid bright lights, strong smells and skipping meals. -Adv Biofeedback, relaxation and meditation techniques. Referral to psychologist for CBT was also discussed with patient  Monitor blood sugar regularly, diabetic control- adv diabetic diet. Goal for fasting blood sugars around 120. Follow up with Endocrinologist/PCP also for on going management   -She was advised to increase fluids ( 5-7  glasses of fluid daily), limit caffeine, avoid cheese products, increase dietary fiber, increase activity and exercise as tolerated and relax regularly and enjoy meals    Maren was seen today for follow-up. Diagnoses and all orders for this visit:    Intractable migraine with aura without status migrainosus  -     SUMAtriptan (IMITREX) 50 MG tablet; Take 1 tablet by mouth once as needed for Migraine  -     topiramate (TOPAMAX) 100 MG tablet; Take 1 tablet by mouth 2 times daily  -     divalproex (DEPAKOTE ER) 500 MG extended release tablet; Take 1 tablet by mouth daily for 1 week, then take 1 tablet by mouth BID    Fibromyalgia  -     methocarbamol (ROBAXIN) 500 MG tablet; Take 1 tablet by mouth 2 times daily  -     oxyCODONE-acetaminophen (PERCOCET) 7.5-325 MG per tablet; Take 1 tablet by mouth every 6 hours as needed for Pain (max 3 per day) for up to 28 days. Chronic pain syndrome  -     methocarbamol (ROBAXIN) 500 MG tablet; Take 1 tablet by mouth 2 times daily  -     DULoxetine (CYMBALTA) 30 MG extended release capsule; Take 1 capsule by mouth daily  -     DULoxetine (CYMBALTA) 60 MG extended release capsule;  Take 1 capsule by mouth nightly  -     buPROPion (WELLBUTRIN XL) 150 MG extended release

## 2018-04-08 DIAGNOSIS — E11.40 CHRONIC PAINFUL DIABETIC NEUROPATHY (HCC): ICD-10-CM

## 2018-04-08 DIAGNOSIS — G89.4 CHRONIC PAIN SYNDROME: ICD-10-CM

## 2018-04-08 DIAGNOSIS — M79.7 FIBROMYALGIA: ICD-10-CM

## 2018-04-09 RX ORDER — OXCARBAZEPINE 600 MG/1
TABLET, FILM COATED ORAL
Qty: 60 TABLET | OUTPATIENT
Start: 2018-04-09

## 2018-04-11 RX ORDER — DOCUSATE SODIUM 100 MG/1
CAPSULE, LIQUID FILLED ORAL
Qty: 30 CAPSULE | Refills: 0 | Status: SHIPPED | OUTPATIENT
Start: 2018-04-11 | End: 2018-05-10 | Stop reason: SDUPTHER

## 2018-04-26 ENCOUNTER — TELEPHONE (OUTPATIENT)
Dept: PRIMARY CARE CLINIC | Age: 63
End: 2018-04-26

## 2018-04-26 RX ORDER — LANCETS 30 GAUGE
EACH MISCELLANEOUS
Qty: 100 EACH | Refills: 5 | Status: SHIPPED | OUTPATIENT
Start: 2018-04-26 | End: 2018-05-03 | Stop reason: SDUPTHER

## 2018-04-30 ENCOUNTER — OFFICE VISIT (OUTPATIENT)
Dept: PAIN MANAGEMENT | Age: 63
End: 2018-04-30

## 2018-04-30 VITALS
BODY MASS INDEX: 24.45 KG/M2 | WEIGHT: 125.2 LBS | DIASTOLIC BLOOD PRESSURE: 76 MMHG | HEART RATE: 79 BPM | SYSTOLIC BLOOD PRESSURE: 149 MMHG

## 2018-04-30 DIAGNOSIS — F51.01 PRIMARY INSOMNIA: ICD-10-CM

## 2018-04-30 DIAGNOSIS — G89.4 CHRONIC PAIN SYNDROME: ICD-10-CM

## 2018-04-30 DIAGNOSIS — G43.119 INTRACTABLE MIGRAINE WITH AURA WITHOUT STATUS MIGRAINOSUS: ICD-10-CM

## 2018-04-30 DIAGNOSIS — E11.40 CHRONIC PAINFUL DIABETIC NEUROPATHY (HCC): ICD-10-CM

## 2018-04-30 DIAGNOSIS — F33.1 MODERATE EPISODE OF RECURRENT MAJOR DEPRESSIVE DISORDER (HCC): ICD-10-CM

## 2018-04-30 DIAGNOSIS — M79.7 FIBROMYALGIA: ICD-10-CM

## 2018-04-30 PROCEDURE — 2022F DILAT RTA XM EVC RTNOPTHY: CPT | Performed by: INTERNAL MEDICINE

## 2018-04-30 PROCEDURE — G8598 ASA/ANTIPLAT THER USED: HCPCS | Performed by: INTERNAL MEDICINE

## 2018-04-30 PROCEDURE — 1036F TOBACCO NON-USER: CPT | Performed by: INTERNAL MEDICINE

## 2018-04-30 PROCEDURE — G8427 DOCREV CUR MEDS BY ELIG CLIN: HCPCS | Performed by: INTERNAL MEDICINE

## 2018-04-30 PROCEDURE — G8420 CALC BMI NORM PARAMETERS: HCPCS | Performed by: INTERNAL MEDICINE

## 2018-04-30 PROCEDURE — 99213 OFFICE O/P EST LOW 20 MIN: CPT | Performed by: INTERNAL MEDICINE

## 2018-04-30 PROCEDURE — 3046F HEMOGLOBIN A1C LEVEL >9.0%: CPT | Performed by: INTERNAL MEDICINE

## 2018-04-30 PROCEDURE — 3017F COLORECTAL CA SCREEN DOC REV: CPT | Performed by: INTERNAL MEDICINE

## 2018-04-30 RX ORDER — OXYCODONE AND ACETAMINOPHEN 7.5; 325 MG/1; MG/1
1 TABLET ORAL EVERY 6 HOURS PRN
Qty: 105 TABLET | Refills: 0 | Status: SHIPPED | OUTPATIENT
Start: 2018-04-30 | End: 2018-06-04 | Stop reason: SDUPTHER

## 2018-04-30 RX ORDER — SUMATRIPTAN 50 MG/1
50 TABLET, FILM COATED ORAL
Qty: 9 TABLET | Refills: 0 | Status: SHIPPED | OUTPATIENT
Start: 2018-04-30 | End: 2018-06-04 | Stop reason: SDUPTHER

## 2018-04-30 RX ORDER — DIVALPROEX SODIUM 500 MG/1
TABLET, EXTENDED RELEASE ORAL
Qty: 60 TABLET | Refills: 1 | Status: SHIPPED | OUTPATIENT
Start: 2018-04-30 | End: 2018-06-04 | Stop reason: SDUPTHER

## 2018-04-30 RX ORDER — DULOXETIN HYDROCHLORIDE 30 MG/1
30 CAPSULE, DELAYED RELEASE ORAL DAILY
Qty: 30 CAPSULE | Refills: 1 | Status: SHIPPED | OUTPATIENT
Start: 2018-04-30 | End: 2018-06-04 | Stop reason: SDUPTHER

## 2018-04-30 RX ORDER — TOPIRAMATE 100 MG/1
100 TABLET, FILM COATED ORAL 2 TIMES DAILY
Qty: 60 TABLET | Refills: 1 | Status: SHIPPED | OUTPATIENT
Start: 2018-04-30 | End: 2018-06-04 | Stop reason: SDUPTHER

## 2018-04-30 RX ORDER — DULOXETIN HYDROCHLORIDE 60 MG/1
60 CAPSULE, DELAYED RELEASE ORAL NIGHTLY
Qty: 30 CAPSULE | Refills: 1 | Status: SHIPPED | OUTPATIENT
Start: 2018-04-30 | End: 2018-06-04 | Stop reason: SDUPTHER

## 2018-04-30 RX ORDER — BUPROPION HYDROCHLORIDE 150 MG/1
150 TABLET ORAL EVERY MORNING
Qty: 30 TABLET | Refills: 1 | Status: SHIPPED | OUTPATIENT
Start: 2018-04-30 | End: 2018-06-04 | Stop reason: SDUPTHER

## 2018-04-30 RX ORDER — QUETIAPINE FUMARATE 25 MG/1
25-50 TABLET, FILM COATED ORAL NIGHTLY
Qty: 60 TABLET | Refills: 1 | Status: SHIPPED | OUTPATIENT
Start: 2018-04-30 | End: 2018-06-04 | Stop reason: SDUPTHER

## 2018-04-30 RX ORDER — METHOCARBAMOL 500 MG/1
500 TABLET, FILM COATED ORAL 2 TIMES DAILY
Qty: 60 TABLET | Refills: 1 | Status: SHIPPED | OUTPATIENT
Start: 2018-04-30 | End: 2018-06-04 | Stop reason: SDUPTHER

## 2018-05-03 ENCOUNTER — OFFICE VISIT (OUTPATIENT)
Dept: PRIMARY CARE CLINIC | Age: 63
End: 2018-05-03

## 2018-05-03 VITALS
SYSTOLIC BLOOD PRESSURE: 134 MMHG | DIASTOLIC BLOOD PRESSURE: 72 MMHG | OXYGEN SATURATION: 99 % | WEIGHT: 124.1 LBS | HEART RATE: 80 BPM | BODY MASS INDEX: 24.36 KG/M2 | HEIGHT: 60 IN | TEMPERATURE: 98 F

## 2018-05-03 DIAGNOSIS — J44.1 COPD EXACERBATION (HCC): ICD-10-CM

## 2018-05-03 DIAGNOSIS — E11.8 TYPE 2 DIABETES MELLITUS WITH COMPLICATION, WITH LONG-TERM CURRENT USE OF INSULIN (HCC): Primary | ICD-10-CM

## 2018-05-03 DIAGNOSIS — Z79.4 TYPE 2 DIABETES MELLITUS WITH COMPLICATION, WITH LONG-TERM CURRENT USE OF INSULIN (HCC): Primary | ICD-10-CM

## 2018-05-03 LAB
CATARACTS: NEGATIVE
DIABETIC RETINOPATHY: NORMAL
GLAUCOMA: NEGATIVE
HBA1C MFR BLD: 9.9 %
INTRAOCULAR PRESSURE EYE: NORMAL
VISUAL ACUITY DISTANCE LEFT EYE: NORMAL
VISUAL ACUITY DISTANCE RIGHT EYE: NORMAL

## 2018-05-03 PROCEDURE — 3046F HEMOGLOBIN A1C LEVEL >9.0%: CPT | Performed by: INTERNAL MEDICINE

## 2018-05-03 PROCEDURE — 2022F DILAT RTA XM EVC RTNOPTHY: CPT | Performed by: INTERNAL MEDICINE

## 2018-05-03 PROCEDURE — G8598 ASA/ANTIPLAT THER USED: HCPCS | Performed by: INTERNAL MEDICINE

## 2018-05-03 PROCEDURE — 3017F COLORECTAL CA SCREEN DOC REV: CPT | Performed by: INTERNAL MEDICINE

## 2018-05-03 PROCEDURE — 3023F SPIROM DOC REV: CPT | Performed by: INTERNAL MEDICINE

## 2018-05-03 PROCEDURE — 1036F TOBACCO NON-USER: CPT | Performed by: INTERNAL MEDICINE

## 2018-05-03 PROCEDURE — 83036 HEMOGLOBIN GLYCOSYLATED A1C: CPT | Performed by: INTERNAL MEDICINE

## 2018-05-03 PROCEDURE — G8926 SPIRO NO PERF OR DOC: HCPCS | Performed by: INTERNAL MEDICINE

## 2018-05-03 PROCEDURE — G8420 CALC BMI NORM PARAMETERS: HCPCS | Performed by: INTERNAL MEDICINE

## 2018-05-03 PROCEDURE — G8427 DOCREV CUR MEDS BY ELIG CLIN: HCPCS | Performed by: INTERNAL MEDICINE

## 2018-05-03 PROCEDURE — 99214 OFFICE O/P EST MOD 30 MIN: CPT | Performed by: INTERNAL MEDICINE

## 2018-05-03 RX ORDER — AZITHROMYCIN 250 MG/1
250 TABLET, FILM COATED ORAL DAILY
Refills: 0 | Status: CANCELLED | OUTPATIENT
Start: 2018-05-03 | End: 2018-05-13

## 2018-05-03 RX ORDER — ONDANSETRON 4 MG/1
4 TABLET, FILM COATED ORAL EVERY 8 HOURS PRN
Qty: 30 TABLET | Refills: 5 | Status: SHIPPED | OUTPATIENT
Start: 2018-05-03 | End: 2018-08-13

## 2018-05-03 RX ORDER — AZITHROMYCIN 250 MG/1
TABLET, FILM COATED ORAL
Qty: 1 PACKET | Refills: 0 | Status: SHIPPED | OUTPATIENT
Start: 2018-05-03 | End: 2018-05-07

## 2018-05-03 RX ORDER — LANCETS 30 GAUGE
EACH MISCELLANEOUS
Qty: 100 EACH | Refills: 5 | Status: SHIPPED | OUTPATIENT
Start: 2018-05-03 | End: 2018-05-08 | Stop reason: SDUPTHER

## 2018-05-03 RX ORDER — PREDNISONE 20 MG/1
20 TABLET ORAL DAILY
Qty: 5 TABLET | Refills: 0 | Status: SHIPPED | OUTPATIENT
Start: 2018-05-03 | End: 2018-05-08

## 2018-05-04 ASSESSMENT — ENCOUNTER SYMPTOMS
ABDOMINAL PAIN: 0
BLOOD IN STOOL: 0
COUGH: 1
CONSTIPATION: 0
DIARRHEA: 0
NAUSEA: 0
SHORTNESS OF BREATH: 1
CHEST TIGHTNESS: 1
ABDOMINAL DISTENTION: 0

## 2018-05-08 ENCOUNTER — TELEPHONE (OUTPATIENT)
Dept: PRIMARY CARE CLINIC | Age: 63
End: 2018-05-08

## 2018-05-08 DIAGNOSIS — Z79.4 TYPE 2 DIABETES MELLITUS WITH COMPLICATION, WITH LONG-TERM CURRENT USE OF INSULIN (HCC): ICD-10-CM

## 2018-05-08 DIAGNOSIS — E11.8 TYPE 2 DIABETES MELLITUS WITH COMPLICATION, WITH LONG-TERM CURRENT USE OF INSULIN (HCC): ICD-10-CM

## 2018-05-08 RX ORDER — LANCETS 30 GAUGE
EACH MISCELLANEOUS
Qty: 100 EACH | Refills: 5 | Status: SHIPPED | OUTPATIENT
Start: 2018-05-08 | End: 2018-08-13

## 2018-05-10 DIAGNOSIS — J44.9 CHRONIC OBSTRUCTIVE PULMONARY DISEASE, UNSPECIFIED COPD TYPE (HCC): ICD-10-CM

## 2018-05-10 DIAGNOSIS — I10 ESSENTIAL HYPERTENSION: ICD-10-CM

## 2018-05-10 DIAGNOSIS — I25.10 CORONARY ARTERY DISEASE INVOLVING NATIVE CORONARY ARTERY OF NATIVE HEART WITHOUT ANGINA PECTORIS: ICD-10-CM

## 2018-05-12 RX ORDER — TORSEMIDE 20 MG/1
20 TABLET ORAL DAILY
Qty: 30 TABLET | Refills: 4 | OUTPATIENT
Start: 2018-05-12

## 2018-05-12 RX ORDER — BUDESONIDE AND FORMOTEROL FUMARATE DIHYDRATE 160; 4.5 UG/1; UG/1
2 AEROSOL RESPIRATORY (INHALATION) DAILY
Qty: 11 G | Refills: 4 | Status: SHIPPED | OUTPATIENT
Start: 2018-05-12 | End: 2018-11-07 | Stop reason: SDUPTHER

## 2018-05-15 RX ORDER — PREDNISONE 20 MG/1
20 TABLET ORAL DAILY
Qty: 10 TABLET | Refills: 0 | Status: SHIPPED | OUTPATIENT
Start: 2018-05-15 | End: 2018-05-25

## 2018-05-17 RX ORDER — ISOSORBIDE MONONITRATE 30 MG/1
30 TABLET, EXTENDED RELEASE ORAL DAILY
Qty: 30 TABLET | Refills: 4 | Status: ON HOLD | OUTPATIENT
Start: 2018-05-17 | End: 2018-06-17 | Stop reason: ALTCHOICE

## 2018-05-17 RX ORDER — DOCUSATE SODIUM 100 MG/1
CAPSULE, LIQUID FILLED ORAL
Qty: 30 CAPSULE | Refills: 5 | Status: SHIPPED | OUTPATIENT
Start: 2018-05-17 | End: 2018-08-13

## 2018-05-21 ENCOUNTER — TELEPHONE (OUTPATIENT)
Dept: PRIMARY CARE CLINIC | Age: 63
End: 2018-05-21

## 2018-05-23 ENCOUNTER — TELEPHONE (OUTPATIENT)
Dept: PRIMARY CARE CLINIC | Age: 63
End: 2018-05-23

## 2018-05-30 ENCOUNTER — TELEPHONE (OUTPATIENT)
Dept: PRIMARY CARE CLINIC | Age: 63
End: 2018-05-30

## 2018-06-04 ENCOUNTER — OFFICE VISIT (OUTPATIENT)
Dept: PAIN MANAGEMENT | Age: 63
End: 2018-06-04

## 2018-06-04 ENCOUNTER — TELEPHONE (OUTPATIENT)
Dept: CARDIOLOGY CLINIC | Age: 63
End: 2018-06-04

## 2018-06-04 VITALS
WEIGHT: 123 LBS | BODY MASS INDEX: 24.02 KG/M2 | DIASTOLIC BLOOD PRESSURE: 79 MMHG | SYSTOLIC BLOOD PRESSURE: 135 MMHG | HEART RATE: 70 BPM

## 2018-06-04 DIAGNOSIS — F51.01 PRIMARY INSOMNIA: ICD-10-CM

## 2018-06-04 DIAGNOSIS — E11.40 CHRONIC PAINFUL DIABETIC NEUROPATHY (HCC): ICD-10-CM

## 2018-06-04 DIAGNOSIS — F33.1 MODERATE EPISODE OF RECURRENT MAJOR DEPRESSIVE DISORDER (HCC): ICD-10-CM

## 2018-06-04 DIAGNOSIS — M79.7 FIBROMYALGIA: ICD-10-CM

## 2018-06-04 DIAGNOSIS — G43.119 INTRACTABLE MIGRAINE WITH AURA WITHOUT STATUS MIGRAINOSUS: ICD-10-CM

## 2018-06-04 DIAGNOSIS — G89.4 CHRONIC PAIN SYNDROME: ICD-10-CM

## 2018-06-04 PROCEDURE — G8420 CALC BMI NORM PARAMETERS: HCPCS | Performed by: INTERNAL MEDICINE

## 2018-06-04 PROCEDURE — 2022F DILAT RTA XM EVC RTNOPTHY: CPT | Performed by: INTERNAL MEDICINE

## 2018-06-04 PROCEDURE — 3046F HEMOGLOBIN A1C LEVEL >9.0%: CPT | Performed by: INTERNAL MEDICINE

## 2018-06-04 PROCEDURE — 1036F TOBACCO NON-USER: CPT | Performed by: INTERNAL MEDICINE

## 2018-06-04 PROCEDURE — 3017F COLORECTAL CA SCREEN DOC REV: CPT | Performed by: INTERNAL MEDICINE

## 2018-06-04 PROCEDURE — 99213 OFFICE O/P EST LOW 20 MIN: CPT | Performed by: INTERNAL MEDICINE

## 2018-06-04 PROCEDURE — G8427 DOCREV CUR MEDS BY ELIG CLIN: HCPCS | Performed by: INTERNAL MEDICINE

## 2018-06-04 PROCEDURE — G8598 ASA/ANTIPLAT THER USED: HCPCS | Performed by: INTERNAL MEDICINE

## 2018-06-04 RX ORDER — DULOXETIN HYDROCHLORIDE 30 MG/1
30 CAPSULE, DELAYED RELEASE ORAL DAILY
Qty: 30 CAPSULE | Refills: 1 | Status: SHIPPED | OUTPATIENT
Start: 2018-06-04 | End: 2018-07-09 | Stop reason: SDUPTHER

## 2018-06-04 RX ORDER — SUMATRIPTAN 50 MG/1
50 TABLET, FILM COATED ORAL
Qty: 9 TABLET | Refills: 0 | Status: ON HOLD | OUTPATIENT
Start: 2018-06-04 | End: 2018-06-17 | Stop reason: DRUGHIGH

## 2018-06-04 RX ORDER — QUETIAPINE FUMARATE 25 MG/1
25-50 TABLET, FILM COATED ORAL NIGHTLY
Qty: 60 TABLET | Refills: 1 | Status: SHIPPED | OUTPATIENT
Start: 2018-06-04 | End: 2018-07-09 | Stop reason: SDUPTHER

## 2018-06-04 RX ORDER — DIVALPROEX SODIUM 500 MG/1
TABLET, EXTENDED RELEASE ORAL
Qty: 60 TABLET | Refills: 1 | Status: SHIPPED | OUTPATIENT
Start: 2018-06-04 | End: 2018-07-09 | Stop reason: SDUPTHER

## 2018-06-04 RX ORDER — METHOCARBAMOL 500 MG/1
500 TABLET, FILM COATED ORAL 2 TIMES DAILY
Qty: 60 TABLET | Refills: 1 | Status: SHIPPED | OUTPATIENT
Start: 2018-06-04 | End: 2018-07-26 | Stop reason: ALTCHOICE

## 2018-06-04 RX ORDER — BUPROPION HYDROCHLORIDE 150 MG/1
150 TABLET ORAL EVERY MORNING
Qty: 30 TABLET | Refills: 1 | Status: SHIPPED | OUTPATIENT
Start: 2018-06-04 | End: 2018-07-09 | Stop reason: SDUPTHER

## 2018-06-04 RX ORDER — OXYCODONE AND ACETAMINOPHEN 7.5; 325 MG/1; MG/1
1 TABLET ORAL EVERY 6 HOURS PRN
Qty: 105 TABLET | Refills: 0 | Status: SHIPPED | OUTPATIENT
Start: 2018-06-04 | End: 2018-07-09 | Stop reason: SDUPTHER

## 2018-06-04 RX ORDER — DULOXETIN HYDROCHLORIDE 60 MG/1
60 CAPSULE, DELAYED RELEASE ORAL NIGHTLY
Qty: 30 CAPSULE | Refills: 1 | Status: ON HOLD | OUTPATIENT
Start: 2018-06-04 | End: 2018-06-17 | Stop reason: DRUGHIGH

## 2018-06-04 RX ORDER — TOPIRAMATE 100 MG/1
100 TABLET, FILM COATED ORAL 2 TIMES DAILY
Qty: 60 TABLET | Refills: 1 | Status: SHIPPED | OUTPATIENT
Start: 2018-06-04 | End: 2018-07-09 | Stop reason: SDUPTHER

## 2018-06-08 DIAGNOSIS — I10 ESSENTIAL HYPERTENSION: ICD-10-CM

## 2018-06-11 DIAGNOSIS — I25.10 CORONARY ARTERY DISEASE INVOLVING NATIVE CORONARY ARTERY OF NATIVE HEART WITHOUT ANGINA PECTORIS: ICD-10-CM

## 2018-06-11 RX ORDER — CHLORTHALIDONE 25 MG/1
TABLET ORAL
Qty: 90 TABLET | Refills: 1 | Status: SHIPPED | OUTPATIENT
Start: 2018-06-11 | End: 2018-07-26 | Stop reason: ALTCHOICE

## 2018-06-11 RX ORDER — RANOLAZINE 1000 MG/1
1000 TABLET, EXTENDED RELEASE ORAL 2 TIMES DAILY
Qty: 60 TABLET | Refills: 3 | Status: SHIPPED | OUTPATIENT
Start: 2018-06-11 | End: 2018-08-13 | Stop reason: SDUPTHER

## 2018-06-13 ENCOUNTER — TELEPHONE (OUTPATIENT)
Dept: PRIMARY CARE CLINIC | Age: 63
End: 2018-06-13

## 2018-06-14 ENCOUNTER — TELEPHONE (OUTPATIENT)
Dept: PRIMARY CARE CLINIC | Age: 63
End: 2018-06-14

## 2018-06-15 ENCOUNTER — TELEPHONE (OUTPATIENT)
Dept: PAIN MANAGEMENT | Age: 63
End: 2018-06-15

## 2018-06-18 RX ORDER — TIZANIDINE 4 MG/1
TABLET ORAL
Qty: 60 TABLET | Refills: 0 | Status: SHIPPED | OUTPATIENT
Start: 2018-06-18 | End: 2018-07-09 | Stop reason: SDUPTHER

## 2018-06-21 DIAGNOSIS — I25.10 CAD IN NATIVE ARTERY: Primary | ICD-10-CM

## 2018-07-06 ENCOUNTER — TELEPHONE (OUTPATIENT)
Dept: CARDIOLOGY CLINIC | Age: 63
End: 2018-07-06

## 2018-07-06 NOTE — TELEPHONE ENCOUNTER
Per Dr. Janiya Barriga, he wants to move Maren's procedure up a week (sooner). I moved it up on the schedule with the cath lab. I notified the patient, today 7/6/18. New date 7/12/18   12:30pm/ pt to arrive & report to cath lab by 11:00am  NPO 5am day of the procedure. All other instructions given to the patient (in folder) remain the same. Just date, arrival time & NPO time are changing. Milad Delgado stated she is ready, and she expressed understanding.

## 2018-07-09 ENCOUNTER — TELEPHONE (OUTPATIENT)
Dept: CARDIOLOGY CLINIC | Age: 63
End: 2018-07-09

## 2018-07-09 ENCOUNTER — OFFICE VISIT (OUTPATIENT)
Dept: PAIN MANAGEMENT | Age: 63
End: 2018-07-09

## 2018-07-09 VITALS
OXYGEN SATURATION: 97 % | SYSTOLIC BLOOD PRESSURE: 135 MMHG | BODY MASS INDEX: 23.56 KG/M2 | RESPIRATION RATE: 16 BRPM | WEIGHT: 120 LBS | HEART RATE: 61 BPM | HEIGHT: 60 IN | DIASTOLIC BLOOD PRESSURE: 75 MMHG

## 2018-07-09 DIAGNOSIS — F51.01 PRIMARY INSOMNIA: ICD-10-CM

## 2018-07-09 DIAGNOSIS — G89.4 CHRONIC PAIN SYNDROME: ICD-10-CM

## 2018-07-09 DIAGNOSIS — M79.7 FIBROMYALGIA: ICD-10-CM

## 2018-07-09 DIAGNOSIS — G43.119 INTRACTABLE MIGRAINE WITH AURA WITHOUT STATUS MIGRAINOSUS: ICD-10-CM

## 2018-07-09 DIAGNOSIS — E11.40 CHRONIC PAINFUL DIABETIC NEUROPATHY (HCC): ICD-10-CM

## 2018-07-09 DIAGNOSIS — F33.1 MODERATE EPISODE OF RECURRENT MAJOR DEPRESSIVE DISORDER (HCC): ICD-10-CM

## 2018-07-09 PROCEDURE — 3046F HEMOGLOBIN A1C LEVEL >9.0%: CPT | Performed by: INTERNAL MEDICINE

## 2018-07-09 PROCEDURE — 2022F DILAT RTA XM EVC RTNOPTHY: CPT | Performed by: INTERNAL MEDICINE

## 2018-07-09 PROCEDURE — 1036F TOBACCO NON-USER: CPT | Performed by: INTERNAL MEDICINE

## 2018-07-09 PROCEDURE — 99213 OFFICE O/P EST LOW 20 MIN: CPT | Performed by: INTERNAL MEDICINE

## 2018-07-09 PROCEDURE — 1111F DSCHRG MED/CURRENT MED MERGE: CPT | Performed by: INTERNAL MEDICINE

## 2018-07-09 PROCEDURE — 3017F COLORECTAL CA SCREEN DOC REV: CPT | Performed by: INTERNAL MEDICINE

## 2018-07-09 PROCEDURE — G8420 CALC BMI NORM PARAMETERS: HCPCS | Performed by: INTERNAL MEDICINE

## 2018-07-09 PROCEDURE — G8598 ASA/ANTIPLAT THER USED: HCPCS | Performed by: INTERNAL MEDICINE

## 2018-07-09 PROCEDURE — G8428 CUR MEDS NOT DOCUMENT: HCPCS | Performed by: INTERNAL MEDICINE

## 2018-07-09 RX ORDER — DIVALPROEX SODIUM 500 MG/1
TABLET, EXTENDED RELEASE ORAL
Qty: 60 TABLET | Refills: 1 | Status: SHIPPED | OUTPATIENT
Start: 2018-07-09 | End: 2018-08-13 | Stop reason: SDUPTHER

## 2018-07-09 RX ORDER — OXYCODONE AND ACETAMINOPHEN 7.5; 325 MG/1; MG/1
1 TABLET ORAL EVERY 6 HOURS PRN
Qty: 105 TABLET | Refills: 0 | Status: SHIPPED | OUTPATIENT
Start: 2018-07-09 | End: 2018-08-13 | Stop reason: SDUPTHER

## 2018-07-09 RX ORDER — BUPROPION HYDROCHLORIDE 150 MG/1
150 TABLET ORAL EVERY MORNING
Qty: 30 TABLET | Refills: 1 | Status: SHIPPED | OUTPATIENT
Start: 2018-07-09 | End: 2018-08-13 | Stop reason: SDUPTHER

## 2018-07-09 RX ORDER — DULOXETIN HYDROCHLORIDE 30 MG/1
30 CAPSULE, DELAYED RELEASE ORAL DAILY
Qty: 30 CAPSULE | Refills: 1 | Status: SHIPPED | OUTPATIENT
Start: 2018-07-09 | End: 2018-08-13 | Stop reason: SDUPTHER

## 2018-07-09 RX ORDER — TOPIRAMATE 100 MG/1
100 TABLET, FILM COATED ORAL 2 TIMES DAILY
Qty: 60 TABLET | Refills: 1 | Status: SHIPPED | OUTPATIENT
Start: 2018-07-09 | End: 2018-08-13 | Stop reason: SDUPTHER

## 2018-07-09 RX ORDER — QUETIAPINE FUMARATE 25 MG/1
25-50 TABLET, FILM COATED ORAL NIGHTLY
Qty: 60 TABLET | Refills: 1 | Status: SHIPPED | OUTPATIENT
Start: 2018-07-09 | End: 2018-08-13 | Stop reason: ALTCHOICE

## 2018-07-09 RX ORDER — TIZANIDINE 4 MG/1
TABLET ORAL
Qty: 60 TABLET | Refills: 1 | Status: SHIPPED | OUTPATIENT
Start: 2018-07-09 | End: 2018-08-13 | Stop reason: SDUPTHER

## 2018-07-09 NOTE — PROGRESS NOTES
Rosa Race  1955  [id number]    HISTORY OF PRESENT ILLNESS:  Ms. Roslyn Dsouza is a 65 y.o. female returns for a follow up visit for multiple medical problems. Her current presenting problems are   1. Chronic pain syndrome    2. Fibromyalgia    3. Chronic painful diabetic neuropathy ([facility] [address]    . As per information/history obtained from the PADT(patient assessment and documentation tool) - She complains of pain in the head, neck and lower back with radiation to the shoulders Bilateral, arms Bilateral, elbows Bilateral and hands Bilateral She rates the pain 8/10 and describes it as sharp, aching, burning, numbness, pins and needles. Pain is made worse by: movement, walking, standing, sitting, bending, lifting. Current treatment regimen has helped relieve about 10% of the pain. She denies side effects from the current pain regimen. Patient reports that since the last follow up visit the physical functioning is unchanged, family/social relationships are unchanged, mood is unchanged and sleep patterns are unchanged, and that the overall functioning is unchanged. Patient denies neurological bowel or bladder. Patient denies misusing/abusing her narcotic pain medications or using any illegal drugs. There are No indicators for possible drug abuse, addiction or diversion problems. Upon obtaining the medical history from Ms. Roslyn Dsouza regarding today's office visit for her presenting problems, Patient complains she has a heart attack last week and was admitted into the hospital for 5 days. She states she will be getting stents put in. She says her back pain is baseline. She says she has no new medications given. She mentions she has a history of DM and her blood sugar has been high, in 200 range. ALLERGIES: Patients list of allergies were reviewed     MEDICATIONS: Ms. Roslyn Dsouza list of medications were reviewed. Her current medications are   Outpatient Medications Prior to Visit   Medication Sig Dispense Refill    (ACCU-CHEK COMPACT CARE KIT) KIT Use as needed 1 kit 0    Dulaglutide (TRULICITY) 1.5 AS/4.2DP SOPN Inject 1.5 mg into the skin once a week 2 pen 3    omeprazole (PRILOSEC) 40 MG delayed release capsule TAKE 1 CAPSULE BY MOUTH 2TIMES DAILY 60 capsule 2    ULTICARE SHORT PEN NEEDLES 31G X 8 MM MISC USE THREE TIMES A DAY 90 each 5    clopidogrel (PLAVIX) 75 MG tablet TAKE ONE TABLET BY MOUTH DAILY 90 tablet 5    insulin aspart (NOVOLOG FLEXPEN) 100 UNIT/ML injection pen Inject 5-15 Units into the skin 3 times daily (before meals) 5 pen 5    atorvastatin (LIPITOR) 80 MG tablet Take 1 tablet by mouth daily 90 tablet 3    aspirin EC 81 MG EC tablet Take 1 tablet by mouth daily 90 tablet 5    amLODIPine (NORVASC) 10 MG tablet Take 1 tablet by mouth daily 30 tablet 6    metoprolol succinate (TOPROL XL) 25 MG extended release tablet Take 0.5 tablets by mouth 2 times daily 60 tablet 5    hydrALAZINE (APRESOLINE) 50 MG tablet Take 1 tablet by mouth 2 times daily 60 tablet 3    Alcohol Swabs (PHARMACIST CHOICE ALCOHOL) PADS USE TO TEST BLOOD GLUCOSE THREE TO FOUR TIMES A  each 5    ranitidine (ZANTAC) 150 MG tablet Take 1 tablet by mouth 2 times daily as needed for Heartburn 60 tablet 3    albuterol sulfate HFA (PROAIR HFA) 108 (90 Base) MCG/ACT inhaler Inhale 2 puffs into the lungs every 6 hours as needed for Wheezing 1 Inhaler 5    nitroGLYCERIN (NITROSTAT) 0.4 MG SL tablet PLACE 1 TABLET UNDER THE TONGUE EVERY 5 MINUTES ASNEEDED FOR CHEST PAIN. 25 tablet 2    SUMAtriptan (IMITREX) 100 MG tablet TAKE 1 TABLET BY MOUTH 2 TIMES DAILY AS NEEDED FORMIGRAINE 9 tablet 2     No current facility-administered medications for this visit. I will continue her current medication regimen  which is part of the above treatment schedule. It has been helping with Ms. Meza's chronic  medical problems which for this visit include: There were no encounter diagnoses.    Risks and benefits of the medications and

## 2018-07-09 NOTE — TELEPHONE ENCOUNTER
Maren is calling this morning and has a few questions for Ignacio Salter about her up and coming procedure that is scheduled for Thursday July 13th.     Maren can be reached at [phone]

## 2018-07-12 PROBLEM — I25.10 CAD IN NATIVE ARTERY: Status: ACTIVE | Noted: 2018-07-12

## 2018-07-23 ENCOUNTER — TELEPHONE (OUTPATIENT)
Dept: CARDIOLOGY CLINIC | Age: 63
End: 2018-07-23

## 2018-07-23 NOTE — TELEPHONE ENCOUNTER
Monet Durham is calling stating she had PTCA/ Stent placed 7/12/18 and for the last couple of days she has had some chest pain and feeling a twinge and wants to see if this is normal?    Maren can be reached at [phone]

## 2018-07-23 NOTE — TELEPHONE ENCOUNTER
Called and spoke with pt. Pt states she is having some \"twinges\" of pain. Pt states it doesn't last long. I reviewed meds with pt and she is taking her DAPT. I advised pt to monitor and call with any changes. Pt voiced understanding.

## 2018-07-26 ENCOUNTER — TELEPHONE (OUTPATIENT)
Dept: CARDIOLOGY CLINIC | Age: 63
End: 2018-07-26

## 2018-08-06 ENCOUNTER — OFFICE VISIT (OUTPATIENT)
Dept: CARDIOLOGY CLINIC | Age: 63
End: 2018-08-06

## 2018-08-06 VITALS
BODY MASS INDEX: 23.36 KG/M2 | DIASTOLIC BLOOD PRESSURE: 70 MMHG | SYSTOLIC BLOOD PRESSURE: 118 MMHG | OXYGEN SATURATION: 98 % | HEIGHT: 60 IN | HEART RATE: 79 BPM | WEIGHT: 119 LBS

## 2018-08-06 DIAGNOSIS — I25.119 CORONARY ARTERY DISEASE INVOLVING NATIVE CORONARY ARTERY OF NATIVE HEART WITH ANGINA PECTORIS (HCC): Primary | ICD-10-CM

## 2018-08-06 DIAGNOSIS — E78.2 MIXED HYPERLIPIDEMIA: ICD-10-CM

## 2018-08-06 DIAGNOSIS — I48.0 PAF (PAROXYSMAL ATRIAL FIBRILLATION) (HCC): ICD-10-CM

## 2018-08-06 DIAGNOSIS — I10 ESSENTIAL HYPERTENSION: ICD-10-CM

## 2018-08-06 DIAGNOSIS — K21.9 GASTROESOPHAGEAL REFLUX DISEASE, ESOPHAGITIS PRESENCE NOT SPECIFIED: ICD-10-CM

## 2018-08-06 DIAGNOSIS — I50.32 CHRONIC DIASTOLIC HF (HEART FAILURE) (HCC): ICD-10-CM

## 2018-08-06 DIAGNOSIS — J44.9 CHRONIC OBSTRUCTIVE PULMONARY DISEASE, UNSPECIFIED COPD TYPE (HCC): ICD-10-CM

## 2018-08-06 PROCEDURE — G8427 DOCREV CUR MEDS BY ELIG CLIN: HCPCS | Performed by: NURSE PRACTITIONER

## 2018-08-06 PROCEDURE — 3017F COLORECTAL CA SCREEN DOC REV: CPT | Performed by: NURSE PRACTITIONER

## 2018-08-06 PROCEDURE — 1036F TOBACCO NON-USER: CPT | Performed by: NURSE PRACTITIONER

## 2018-08-06 PROCEDURE — 3023F SPIROM DOC REV: CPT | Performed by: NURSE PRACTITIONER

## 2018-08-06 PROCEDURE — 99214 OFFICE O/P EST MOD 30 MIN: CPT | Performed by: NURSE PRACTITIONER

## 2018-08-06 PROCEDURE — G8926 SPIRO NO PERF OR DOC: HCPCS | Performed by: NURSE PRACTITIONER

## 2018-08-06 PROCEDURE — 93000 ELECTROCARDIOGRAM COMPLETE: CPT | Performed by: NURSE PRACTITIONER

## 2018-08-06 PROCEDURE — G8420 CALC BMI NORM PARAMETERS: HCPCS | Performed by: NURSE PRACTITIONER

## 2018-08-06 PROCEDURE — G8598 ASA/ANTIPLAT THER USED: HCPCS | Performed by: NURSE PRACTITIONER

## 2018-08-06 RX ORDER — OMEPRAZOLE 20 MG/1
20 CAPSULE, DELAYED RELEASE ORAL DAILY
Qty: 2 CAPSULE | Refills: 2 | Status: SHIPPED | OUTPATIENT
Start: 2018-08-06 | End: 2018-08-13

## 2018-08-06 RX ORDER — AMLODIPINE BESYLATE 10 MG/1
5 TABLET ORAL DAILY
Qty: 30 TABLET | Refills: 2 | Status: SHIPPED | OUTPATIENT
Start: 2018-08-06 | End: 2018-08-13 | Stop reason: SDUPTHER

## 2018-08-06 NOTE — PROGRESS NOTES
[facility]  Office Visit    Angelo Mallory  1955    August 6, 2018    CC:   Chief Complaint   Patient presents with    Hypertension     hld,cad,copd,af,dm/ hospital f/u/ headache chest discomfort and sob when she  gets up to do things around the house/     HPI:  The patient is 65 y.o. female with a past medical history significant for  CAD/stents, DHF, HTN, HLD, anxiety, fibromyalgia, COPD, drug seekingbehavior, depression  and chronic pain syndrome who is here for hospital follow up. Has had multiple hospital admissions, with the last from 7/26/2018-7/27/2018 when she presented again with chest pain. She had stent to the RCA 10 days prior to admission and presented with recurrent chest pain. Seen by Dr. Kaleigh Daily, her ECG and troponins were unremarkable and discharged. During that stay she was asking for stronger pain medication citing that Percocet wasn't helping. Continues to have episodes of L side chest pain occurring at rest and with activity; NTG at times with relief. Associated with nausea and improved with use of zofran. Is not taking her PPI. Working with PT at home. Has not been taking omperazole of amlodipine (\"I'm not getting those from pharmacy. \" She is wanting the pain med she had while in the hospital.      Review of Systems:  Constitutional: Denies  fatigue, weakness, night sweats or fever. HEENT: Denies new visual changes, ringing in ears, nosebleeds, nasal congestion  Respiratory: Denies new or change in SOB, PND, orthopnea or cough. Cardiovascular: see HPI  GI: Denies constipation, abdominal pain, change in bowel habits, melena or hematochezia. + chronic nausea and occasional diarrhea  : Denies urinary frequency, urgency, incontinence, hematuria or dysuria. Skin: Denies rash, hives, or cyanosis  Musculoskeletal: + generalized muscle pain  Neurological: Denies syncope or TIA-like symptoms.   Psychiatric: Denies anxiety, insomnia or depression     Past Medical History:   Diagnosis Date    Acid reflux     Anemia     Anxiety     Arthritis     Asthma     Atrial fibrillation (HCC)     Blood transfusion reaction     CAD (coronary artery disease) 12/3/2012    CH[facility]estive [facility] failure[facility]     Chronic kidney disease     40% kidney functiom    COPD (chronic obstructive pulmonary disease) ([facility]     Depression     Dysarthria     Fibromyalgia 6/7/2016    Headache(784.0) 2/19/2013    Heart disease     Hemisensory loss     Hyperlipidemia     Hypertension     IBS (irritable bowel syndrome)     Inferior vena cava occlusion (HCC)     Irritable bowel syndrome     Keratitis     Neuromuscular disorder ([facility])     fibromyalgia    Neuropathy     Superior vena cava obstruction     Temporal arteritis ([facility] [address] 2/24/2014    Type II or unspecified type diabetes mellitus without mention of complication, not stated as uncontrolled      Past Surgical History:   Procedure Laterality Date    ABLATION OF DYSRHYTHMIC FOCUS      ARTERY BIOPSY Right 04/23/2014    RIGHT TEMPORAL ARTERY BIOPSY    CATARACT REMOVAL Bilateral     CHOLECYSTECTOMY      CORONARY ANGIOPLASTY WITH STENT PLACEMENT      CORONARY ANGIOPLASTY WITH STENT PLACEMENT      HYSTERECTOMY      JOINT REPLACEMENT Right     KNEE ARTHROSCOPY Right     KNEE SURGERY      right knee replacement    TUNNELED VENOUS PORT PLACEMENT      left thigh.      VASCULAR SURGERY       Family History   Problem Relation Age of Onset    Diabetes Mother     Hypertension Mother     High Cholesterol Mother     Stroke Mother     Cancer Mother      Social History   Substance Use Topics    Smoking status: Former Smoker     Packs/day: 0.50     Years: 35.00     Types: Cigarettes     Quit date: 4/26/2015    Smokeless tobacco: Never Used      Comment: 5/13/15 has not smoked since hospitalization - kh    Alcohol use No       No Known Allergies  Current Outpatient Prescriptions   Medication Sig Dispense Refill    amLODIPine (NORVASC) 10 MG tablet Take 0.5 tablets by mouth daily 30 tablet 2    omeprazole (PRILOSEC) 20 MG delayed release capsule Take 1 capsule by mouth daily 2 capsule 2    hydrALAZINE (APRESOLINE) 50 MG tablet Take 0.5 tablets by mouth 2 times daily 60 tablet 3    hydrochlorothiazide (HYDRODIURIL) 25 MG tablet Take 25 mg by mouth daily       dicyclomine (BENTYL) 10 MG capsule Take 10 mg by mouth daily       QUEtiapine (SEROQUEL) 25 MG tablet Take 1-2 tablets by mouth nightly 60 tablet 1    divalproex (DEPAKOTE ER) 500 MG extended release tablet take 1 tablet by mouth BID 60 tablet 1    topiramate (TOPAMAX) 100 MG tablet Take 1 tablet by mouth 2 times daily 60 tablet 1    buPROPion (WELLBUTRIN XL) 150 MG extended release tablet Take 1 tablet by mouth every morning 30 tablet 1    DULoxetine (CYMBALTA) 30 MG extended release capsule Take 1 capsule by mouth daily 30 capsule 1    oxyCODONE-acetaminophen (PERCOCET) 7.5-325 MG per tablet Take 1 tablet by mouth every 6 hours as needed for Pain for up to 35 days. . 105 tablet 0    isosorbide mononitrate (IMDUR) 30 MG extended release tablet Take 1 tablet by mouth daily 30 tablet 0    furosemide (LASIX) 20 MG tablet Take 1 tablet by mouth daily       insulin glargine (BASAGLAR KWIKPEN) 100 UNIT/ML injection pen Inject 40 units in am  and  35 units in pm (Patient taking differently: Inject 45 units in am  and  35 units in pm) 5 pen 0    ranolazine (RANEXA) 1000 MG extended release tablet Take 1 tablet by mouth 2 times daily 60 tablet 3    docusate sodium (COLACE) 100 MG capsule TAKE ONE CAPSULE BY MOUTHEVERY DAY (Patient taking differently: TAKE ONE CAPSULE BY MOUTH DAILY AS NEEDED) 30 capsule 5    SYMBICORT 160-4.5 MCG/ACT AERO INHALE 2 PUFFS INTO THE  LUNGS DAILY 11 g 4    glucose blood VI test strips (TRUE METRIX BLOOD GLUCOSE TEST) strip USE TO TEST BLOOD GLUCOSE THREE TO FOUR TIMES A  each 5    Blood Glucose Monitoring Suppl DAYO Test three times daily stent, 3.0 x 8 and 3.5 x 8.  2.  The left circumflex had 90% stenosis. This was treated with one drug-coated stent, 2.25 x 12. We expanded up to 2.5 mm. Cardiac Cath 6/20/2018:  1. The left main is normal without significant stenosis. 2.  The left anterior descending artery comes from the left main coronary artery. It has a stent present in the mid portion which is also in the diagonal.  It is bifurcating stent and is patent. 3.  The left circumflex comes from the left main coronary artery. The left circumflex is nondominant. The distal portion has 99% stenosis present. 4.  The right coronary comes from the right coronary cusp. The proximal portion has 90% stenosis present. LV ejection fraction 60%. Echo 6/18/2018:  Left ventricular cavity size is normal.   Normal left ventricular wall thickness.   Ejection fraction is visually estimated to be 55-60%.   Normal right ventricular size and function.   Trivial valvular disease.     3/6/2018: Lexiscan-Myoview:  Normal myocardial perfusion study.    Normal LV size and systolic function.    Overall findings represent a low risk study. 11/2016 Lexiscan-Myoview: There is normal isotope uptake at stress and rest. There is no evidence of    myocardial ischemia or scar.       [facility] 6/2/2016:  1.  The left main coronary artery comes from the left coronary cusp.  It is a short left main with YAKELIN-3 flow.  No significant stenosis and gave rise to left anterior descending artery and left circumflex artery. 2.  The left anterior descending  artery gave rise to diagonal branches and had the stent present in the mid portion which was patent.  Distal LAD, left anterior descending artery has 50% stenosis present. 3.  The left circumflex comes from the left main coronary artery.  The 1st obtuse marginal has a stent present.  The 2nd obtuse marginal in the mid portion has 50% stenosis.   4.  Right coronary artery comes from right coronary cusp and the proximal portion hydralazine, isosorbide mononitrate, lasix, HCTZ, ranexa, ASA and plavix  Discussed low fat/low sodium diet and reinforced regular aerobic exercise. Check BMP  Follow up with Dr. Carol Roth as scheduled in October 2018 or sooner if needed    Return for as scheduled in October 2018 with Dr. Carol Roth. Thanks for allowing me to participate in the care of this patient.       [facility], [address] [facility], [address] [place] [address] [address], [address] [place] [address]  Office: [phone]  Fax: [phone]

## 2018-08-07 NOTE — COMMUNICATION BODY
inhalers    7. Gastroesophageal reflux disease, esophagitis presence not specified  -has not been taking PPI (will resume)     8. Fibromyalgia  -chronic generalized myalgias  -follows pain clinic     9. Diabetes Mellitus, type II  -per Primary    Plan:    Add Amlodipine 5 mg daily  Add Omeprazole 40 mg daily (x 2 months)  Continue amlodipine, hydralazine, isosorbide mononitrate, lasix, HCTZ, ranexa, ASA and plavix  Discussed low fat/low sodium diet and reinforced regular aerobic exercise. Check BMP  Follow up with Dr. Carolyn Tirado as scheduled in October 2018 or sooner if needed    Return for as scheduled in October 2018 with Dr. Carolyn Tirado. Thanks for allowing me to participate in the care of this patient.

## 2018-08-10 RX ORDER — INSULIN GLARGINE 100 [IU]/ML
INJECTION, SOLUTION SUBCUTANEOUS
Qty: 15 ML | Refills: 5 | Status: SHIPPED | OUTPATIENT
Start: 2018-08-10 | End: 2018-08-13

## 2018-08-13 ENCOUNTER — OFFICE VISIT (OUTPATIENT)
Dept: PAIN MANAGEMENT | Age: 63
End: 2018-08-13

## 2018-08-13 ENCOUNTER — OFFICE VISIT (OUTPATIENT)
Dept: PRIMARY CARE CLINIC | Age: 63
End: 2018-08-13

## 2018-08-13 VITALS
WEIGHT: 120 LBS | OXYGEN SATURATION: 96 % | HEART RATE: 80 BPM | HEIGHT: 60 IN | BODY MASS INDEX: 23.56 KG/M2 | RESPIRATION RATE: 14 BRPM | SYSTOLIC BLOOD PRESSURE: 138 MMHG | TEMPERATURE: 97.3 F | DIASTOLIC BLOOD PRESSURE: 89 MMHG

## 2018-08-13 VITALS
TEMPERATURE: 97.3 F | OXYGEN SATURATION: 99 % | DIASTOLIC BLOOD PRESSURE: 61 MMHG | HEIGHT: 59 IN | HEART RATE: 64 BPM | SYSTOLIC BLOOD PRESSURE: 117 MMHG | BODY MASS INDEX: 23.99 KG/M2 | WEIGHT: 119 LBS

## 2018-08-13 DIAGNOSIS — Z23 NEED FOR SHINGLES VACCINE: ICD-10-CM

## 2018-08-13 DIAGNOSIS — F33.1 MODERATE EPISODE OF RECURRENT MAJOR DEPRESSIVE DISORDER (HCC): ICD-10-CM

## 2018-08-13 DIAGNOSIS — G25.0 BENIGN ESSENTIAL TREMOR: ICD-10-CM

## 2018-08-13 DIAGNOSIS — I10 ESSENTIAL HYPERTENSION: ICD-10-CM

## 2018-08-13 DIAGNOSIS — M31.6 GIANT CELL ARTERITIS (HCC): ICD-10-CM

## 2018-08-13 DIAGNOSIS — F51.01 PRIMARY INSOMNIA: ICD-10-CM

## 2018-08-13 DIAGNOSIS — M79.7 FIBROMYALGIA: ICD-10-CM

## 2018-08-13 DIAGNOSIS — G89.4 CHRONIC PAIN SYNDROME: ICD-10-CM

## 2018-08-13 DIAGNOSIS — G43.119 INTRACTABLE MIGRAINE WITH AURA WITHOUT STATUS MIGRAINOSUS: ICD-10-CM

## 2018-08-13 DIAGNOSIS — K21.9 GASTROESOPHAGEAL REFLUX DISEASE WITHOUT ESOPHAGITIS: ICD-10-CM

## 2018-08-13 DIAGNOSIS — I25.10 CORONARY ARTERY DISEASE INVOLVING NATIVE CORONARY ARTERY OF NATIVE HEART WITHOUT ANGINA PECTORIS: ICD-10-CM

## 2018-08-13 DIAGNOSIS — Z09 HOSPITAL DISCHARGE FOLLOW-UP: Primary | ICD-10-CM

## 2018-08-13 DIAGNOSIS — F33.41 RECURRENT MAJOR DEPRESSIVE DISORDER, IN PARTIAL REMISSION (HCC): ICD-10-CM

## 2018-08-13 DIAGNOSIS — N28.9 RENAL INSUFFICIENCY: ICD-10-CM

## 2018-08-13 DIAGNOSIS — E11.40 CHRONIC PAINFUL DIABETIC NEUROPATHY (HCC): ICD-10-CM

## 2018-08-13 PROCEDURE — G8427 DOCREV CUR MEDS BY ELIG CLIN: HCPCS | Performed by: INTERNAL MEDICINE

## 2018-08-13 PROCEDURE — 1036F TOBACCO NON-USER: CPT | Performed by: INTERNAL MEDICINE

## 2018-08-13 PROCEDURE — 99214 OFFICE O/P EST MOD 30 MIN: CPT | Performed by: INTERNAL MEDICINE

## 2018-08-13 PROCEDURE — 3046F HEMOGLOBIN A1C LEVEL >9.0%: CPT | Performed by: INTERNAL MEDICINE

## 2018-08-13 PROCEDURE — 3017F COLORECTAL CA SCREEN DOC REV: CPT | Performed by: INTERNAL MEDICINE

## 2018-08-13 PROCEDURE — G8420 CALC BMI NORM PARAMETERS: HCPCS | Performed by: INTERNAL MEDICINE

## 2018-08-13 PROCEDURE — 2022F DILAT RTA XM EVC RTNOPTHY: CPT | Performed by: INTERNAL MEDICINE

## 2018-08-13 PROCEDURE — G8598 ASA/ANTIPLAT THER USED: HCPCS | Performed by: INTERNAL MEDICINE

## 2018-08-13 PROCEDURE — 1111F DSCHRG MED/CURRENT MED MERGE: CPT | Performed by: INTERNAL MEDICINE

## 2018-08-13 RX ORDER — TORSEMIDE 20 MG/1
20 TABLET ORAL DAILY
Qty: 30 TABLET | Refills: 5 | Status: SHIPPED | OUTPATIENT
Start: 2018-08-13 | End: 2019-03-06 | Stop reason: SDUPTHER

## 2018-08-13 RX ORDER — OXYCODONE AND ACETAMINOPHEN 7.5; 325 MG/1; MG/1
1 TABLET ORAL EVERY 6 HOURS PRN
Qty: 106 TABLET | Refills: 0 | Status: SHIPPED | OUTPATIENT
Start: 2018-08-13 | End: 2018-09-12 | Stop reason: SDUPTHER

## 2018-08-13 RX ORDER — TIZANIDINE 4 MG/1
TABLET ORAL
Qty: 60 TABLET | Refills: 1 | Status: SHIPPED | OUTPATIENT
Start: 2018-08-13 | End: 2018-09-12 | Stop reason: SDUPTHER

## 2018-08-13 RX ORDER — QUETIAPINE FUMARATE 50 MG/1
50 TABLET, FILM COATED ORAL NIGHTLY
Qty: 60 TABLET | Refills: 1 | Status: SHIPPED | OUTPATIENT
Start: 2018-08-13 | End: 2018-10-09 | Stop reason: SDUPTHER

## 2018-08-13 RX ORDER — AMLODIPINE BESYLATE 10 MG/1
5 TABLET ORAL DAILY
Qty: 30 TABLET | Refills: 5 | Status: SHIPPED | OUTPATIENT
Start: 2018-08-13 | End: 2019-03-06 | Stop reason: SDUPTHER

## 2018-08-13 RX ORDER — METOPROLOL SUCCINATE 25 MG/1
12.5 TABLET, EXTENDED RELEASE ORAL 2 TIMES DAILY
Qty: 60 TABLET | Refills: 5 | Status: SHIPPED | OUTPATIENT
Start: 2018-08-13 | End: 2018-11-26 | Stop reason: DRUGHIGH

## 2018-08-13 RX ORDER — HYDRALAZINE HYDROCHLORIDE 50 MG/1
50 TABLET, FILM COATED ORAL 2 TIMES DAILY
Qty: 60 TABLET | Refills: 5 | Status: SHIPPED | OUTPATIENT
Start: 2018-08-13 | End: 2019-03-06 | Stop reason: SDUPTHER

## 2018-08-13 RX ORDER — CLOPIDOGREL BISULFATE 75 MG/1
75 TABLET ORAL DAILY
Qty: 90 TABLET | Refills: 5 | Status: SHIPPED | OUTPATIENT
Start: 2018-08-13 | End: 2019-09-04 | Stop reason: SDUPTHER

## 2018-08-13 RX ORDER — TOPIRAMATE 100 MG/1
100 TABLET, FILM COATED ORAL 2 TIMES DAILY
Qty: 60 TABLET | Refills: 1 | Status: SHIPPED | OUTPATIENT
Start: 2018-08-13 | End: 2018-09-12 | Stop reason: SDUPTHER

## 2018-08-13 RX ORDER — RANOLAZINE 1000 MG/1
1000 TABLET, EXTENDED RELEASE ORAL 2 TIMES DAILY
Qty: 60 TABLET | Refills: 3 | Status: SHIPPED | OUTPATIENT
Start: 2018-08-13 | End: 2018-12-07 | Stop reason: SDUPTHER

## 2018-08-13 RX ORDER — ATORVASTATIN CALCIUM 80 MG/1
80 TABLET, FILM COATED ORAL DAILY
Qty: 90 TABLET | Refills: 3 | Status: SHIPPED | OUTPATIENT
Start: 2018-08-13 | End: 2019-02-05

## 2018-08-13 RX ORDER — RANITIDINE 150 MG/1
150 TABLET ORAL 2 TIMES DAILY PRN
Qty: 60 TABLET | Refills: 3 | Status: SHIPPED | OUTPATIENT
Start: 2018-08-13 | End: 2019-10-24

## 2018-08-13 RX ORDER — ASPIRIN 81 MG/1
81 TABLET ORAL DAILY
Qty: 90 TABLET | Refills: 3 | Status: SHIPPED | OUTPATIENT
Start: 2018-08-13 | End: 2018-12-07 | Stop reason: SDUPTHER

## 2018-08-13 RX ORDER — ISOSORBIDE MONONITRATE 30 MG/1
30 TABLET, EXTENDED RELEASE ORAL DAILY
Qty: 30 TABLET | Refills: 5 | Status: SHIPPED | OUTPATIENT
Start: 2018-08-13 | End: 2019-03-06 | Stop reason: SDUPTHER

## 2018-08-13 RX ORDER — BUPROPION HYDROCHLORIDE 150 MG/1
150 TABLET ORAL EVERY MORNING
Qty: 30 TABLET | Refills: 1 | Status: SHIPPED | OUTPATIENT
Start: 2018-08-13 | End: 2018-09-12 | Stop reason: SDUPTHER

## 2018-08-13 RX ORDER — DULOXETIN HYDROCHLORIDE 30 MG/1
30 CAPSULE, DELAYED RELEASE ORAL DAILY
Qty: 30 CAPSULE | Refills: 1 | Status: SHIPPED | OUTPATIENT
Start: 2018-08-13 | End: 2018-09-12

## 2018-08-13 RX ORDER — DIVALPROEX SODIUM 500 MG/1
TABLET, EXTENDED RELEASE ORAL
Qty: 60 TABLET | Refills: 1 | Status: SHIPPED | OUTPATIENT
Start: 2018-08-13 | End: 2018-09-12 | Stop reason: SDUPTHER

## 2018-08-13 ASSESSMENT — ENCOUNTER SYMPTOMS
GASTROINTESTINAL NEGATIVE: 1
NAUSEA: 0
VISUAL CHANGE: 0
SINUS PRESSURE: 0
ABDOMINAL PAIN: 0
EYES NEGATIVE: 1
EYE REDNESS: 0
ABDOMINAL DISTENTION: 0
EYE PAIN: 0
PHOTOPHOBIA: 0
SORE THROAT: 0
CHEST TIGHTNESS: 0

## 2018-08-13 NOTE — PROGRESS NOTES
1 tablet by mouth daily  -     clopidogrel (PLAVIX) 75 MG tablet; Take 1 tablet by mouth daily  -     ranolazine (RANEXA) 1000 MG extended release tablet; Take 1 tablet by mouth 2 times daily  -     aspirin EC 81 MG EC tablet; Take 1 tablet by mouth daily  -     atorvastatin (LIPITOR) 80 MG tablet; Take 1 tablet by mouth daily    Uncontrolled type 2 diabetes mellitus with complication, with long-term current use of insulin ([facility])A1C 9.3 in Hospital   Cont Trulicity   Increase Lantus to 40 U in am + 50 U in pm   Must eat reg  3-4 x d ; and not skip meals   -     Dulaglutide (TRULICITY) 1.5 MC/3.8SC SOPN; Inject 1.5 mg into the skin once a week  -     insulin aspart (NOVOLOG FLEXPEN) 100 UNIT/ML injection pen; Inject 5-15 Units into the skin 3 times daily (before meals)  -     insulin glargine (LANTUS SOLOSTAR) 100 UNIT/ML injection pen; Inject 40-50 Units into the skin nightly 40 U in am + 50 U in pm  -     atorvastatin (LIPITOR) 80 MG tablet; Take 1 tablet by mouth daily  -      DIABETES FOOT EXAM    Gastroesophageal reflux disease without esophagitis  Dc PPI . Can take Zantac   -     ranitidine (ZANTAC) 150 MG tablet; Take 1 tablet by mouth 2 times daily as needed for Heartburn    Renal insufficiency  Avoid Nsaids / PPI . Control BP / Sugar    Need for shingles vaccine  -     zoster recombinant adjuvanted vaccine (SHINGRIX) 50 MCG SUSR injection; Inject 0.5 mLs into the muscle once for 1 dose          Chronic obstructive pulmonary disease, unspecified COPD type ([facility]  -     budesonide-formoterol (SYMBICORT) 160-4.5 MCG/ACT AERO; Inhale 2 puffs into the lungs daily  -     albuterol sulfate HFA (PROAIR HFA) 108 (90 Base) MCG/ACT inhaler; Inhale 2 puffs into the lungs every 6 hours as needed for Wheezing      Mixed hyperlipidemia  Lipitor 80   -         Anxiety  . cymbalta per Pain Dr   -           Plan:     Self Management Goals.      Know which medication is for what condition:   Know correct dose/frequency of mouth daily 30 capsule 1    oxyCODONE-acetaminophen (PERCOCET) 7.5-325 MG per tablet Take 1 tablet by mouth every 6 hours as needed for Pain (max 3-4 per day) for up to 30 days. . 106 tablet 0    QUEtiapine (SEROQUEL) 50 MG tablet Take 1 tablet by mouth nightly 60 tablet 1    amLODIPine (NORVASC) 10 MG tablet Take 0.5 tablets by mouth daily 30 tablet 5    metoprolol succinate (TOPROL XL) 25 MG extended release tablet Take 0.5 tablets by mouth 2 times daily 60 tablet 5    isosorbide mononitrate (IMDUR) 30 MG extended release tablet Take 1 tablet by mouth daily 30 tablet 5    torsemide (DEMADEX) 20 MG tablet Take 1 tablet by mouth daily 30 tablet 5    hydrALAZINE (APRESOLINE) 50 MG tablet Take 1 tablet by mouth 2 times daily 60 tablet 5    clopidogrel (PLAVIX) 75 MG tablet Take 1 tablet by mouth daily 90 tablet 5    ranolazine (RANEXA) 1000 MG extended release tablet Take 1 tablet by mouth 2 times daily 60 tablet 3    aspirin EC 81 MG EC tablet Take 1 tablet by mouth daily 90 tablet 3    Dulaglutide (TRULICITY) 1.5 UJ/5.4MV SOPN Inject 1.5 mg into the skin once a week 4 pen 3    insulin aspart (NOVOLOG FLEXPEN) 100 UNIT/ML injection pen Inject 5-15 Units into the skin 3 times daily (before meals) 5 pen 5    insulin glargine (LANTUS SOLOSTAR) 100 UNIT/ML injection pen Inject 40-50 Units into the skin nightly 40 U in am + 50 U in pm 5 pen 3    ranitidine (ZANTAC) 150 MG tablet Take 1 tablet by mouth 2 times daily as needed for Heartburn 60 tablet 3    atorvastatin (LIPITOR) 80 MG tablet Take 1 tablet by mouth daily 90 tablet 3    zoster recombinant adjuvanted vaccine (SHINGRIX) 50 MCG SUSR injection Inject 0.5 mLs into the muscle once for 1 dose 0.5 mL 0    SYMBICORT 160-4.5 MCG/ACT AERO INHALE 2 PUFFS INTO THE  LUNGS DAILY 11 g 4    albuterol sulfate HFA (PROAIR HFA) 108 (90 Base) MCG/ACT inhaler Inhale 2 puffs into the lungs every 6 hours as needed for Wheezing 1 Inhaler 5    nitroGLYCERIN (NITROSTAT) 0.4 MG SL tablet PLACE 1 TABLET UNDER THE TONGUE EVERY 5 MINUTES ASNEEDED FOR CHEST PAIN. 25 tablet 2    SUMAtriptan (IMITREX) 100 MG tablet TAKE 1 TABLET BY MOUTH 2 TIMES DAILY AS NEEDED FORMIGRAINE 9 tablet 2        Medications patient taking as of now reconciled against medications ordered at time of hospital discharge:     Chief Complaint   Patient presents with   [address] from [facility] [place]     8/2/18        HPI    Inpatient course: Discharge summary reviewed- see chart. Review of Systems    Vitals:    08/13/18 1358   BP: 117/61   Pulse: 64   Temp: 97.3 °F (36.3 °C)   SpO2: 99%   Weight: 119 lb (54 kg)   Height: 4' 11\" (1.499 m)     Body mass index is 24.04 kg/m². Wt Readings from Last 3 Encounters:   08/13/18 119 lb (54 kg)   08/13/18 120 lb (54.4 kg)   08/06/18 119 lb (54 kg)     BP Readings from Last 3 Encounters:   08/13/18 117/61   08/13/18 138/89   08/06/18 118/70       Physical Exam        Assessment/Plan:  1. Hospital discharge follow-up    - CT DISCHARGE MEDS RECONCILED W/ CURRENT OUTPATIENT MED LIST    2. Essential hypertension    - amLODIPine (NORVASC) 10 MG tablet; Take 0.5 tablets by mouth daily  Dispense: 30 tablet; Refill: 5  - metoprolol succinate (TOPROL XL) 25 MG extended release tablet; Take 0.5 tablets by mouth 2 times daily  Dispense: 60 tablet; Refill: 5  - isosorbide mononitrate (IMDUR) 30 MG extended release tablet; Take 1 tablet by mouth daily  Dispense: 30 tablet; Refill: 5  - torsemide (DEMADEX) 20 MG tablet; Take 1 tablet by mouth daily  Dispense: 30 tablet; Refill: 5  - hydrALAZINE (APRESOLINE) 50 MG tablet; Take 1 tablet by mouth 2 times daily  Dispense: 60 tablet; Refill: 5    3. Coronary artery disease involving native coronary artery of native heart without angina pectoris    - metoprolol succinate (TOPROL XL) 25 MG extended release tablet; Take 0.5 tablets by mouth 2 times daily  Dispense: 60 tablet;  Refill: 5  - isosorbide mononitrate (IMDUR) 30 MG extended release tablet; Take 1 tablet by mouth daily  Dispense: 30 tablet; Refill: 5  - torsemide (DEMADEX) 20 MG tablet; Take 1 tablet by mouth daily  Dispense: 30 tablet; Refill: 5  - clopidogrel (PLAVIX) 75 MG tablet; Take 1 tablet by mouth daily  Dispense: 90 tablet; Refill: 5  - ranolazine (RANEXA) 1000 MG extended release tablet; Take 1 tablet by mouth 2 times daily  Dispense: 60 tablet; Refill: 3  - aspirin EC 81 MG EC tablet; Take 1 tablet by mouth daily  Dispense: 90 tablet; Refill: 3  - atorvastatin (LIPITOR) 80 MG tablet; Take 1 tablet by mouth daily  Dispense: 90 tablet; Refill: 3    4. Uncontrolled type 2 diabetes mellitus with complication, with long-term current use of insulin (HCC)    - Dulaglutide (TRULICITY) 1.5 [place]/4.7PP SOPN; Inject 1.5 mg into the skin once a week  Dispense: 4 pen; Refill: 3  - insulin aspart (NOVOLOG FLEXPEN) 100 UNIT/ML injection pen; Inject 5-15 Units into the skin 3 times daily (before meals)  Dispense: 5 pen; Refill: 5  - insulin glargine (LANTUS SOLOSTAR) 100 UNIT/ML injection pen; Inject 40-50 Units into the skin nightly 40 U in am + 50 U in pm  Dispense: 5 pen; Refill: 3  - atorvastatin (LIPITOR) 80 MG tablet; Take 1 tablet by mouth daily  Dispense: 90 tablet; Refill: 3  - HM DIABETES FOOT EXAM    5. Gastroesophageal reflux disease without esophagitis    - ranitidine (ZANTAC) 150 MG tablet; Take 1 tablet by mouth 2 times daily as needed for Heartburn  Dispense: 60 tablet; Refill: 3    6. Renal insufficiency      7. Need for shingles vaccine    - zoster recombinant adjuvanted vaccine (SHINGRIX) 50 MCG SUSR injection;  Inject 0.5 mLs into the muscle once for 1 dose  Dispense: 0.5 mL; Refill: 0

## 2018-08-14 LAB
A/G RATIO: 1.4 (ref 1.1–2.2)
ALBUMIN SERPL-MCNC: 4.4 G/DL (ref 3.4–5)
ALP BLD-CCNC: 133 U/L (ref 40–129)
ALT SERPL-CCNC: 28 U/L (ref 10–40)
ANION GAP SERPL CALCULATED.3IONS-SCNC: 18 MMOL/L (ref 3–16)
AST SERPL-CCNC: 27 U/L (ref 15–37)
BILIRUB SERPL-MCNC: 0.3 MG/DL (ref 0–1)
BUN BLDV-MCNC: 19 MG/DL (ref 7–20)
CALCIUM SERPL-MCNC: 9.4 MG/DL (ref 8.3–10.6)
CHLORIDE BLD-SCNC: 103 MMOL/L (ref 99–110)
CO2: 19 MMOL/L (ref 21–32)
CREAT SERPL-MCNC: 1.4 MG/DL (ref 0.6–1.2)
GFR AFRICAN AMERICAN: 46
GFR NON-AFRICAN AMERICAN: 38
GLOBULIN: 3.1 G/DL
GLUCOSE BLD-MCNC: 283 MG/DL (ref 70–99)
POTASSIUM SERPL-SCNC: 3.7 MMOL/L (ref 3.5–5.1)
SODIUM BLD-SCNC: 140 MMOL/L (ref 136–145)
TOTAL PROTEIN: 7.5 G/DL (ref 6.4–8.2)

## 2018-08-22 ENCOUNTER — TELEPHONE (OUTPATIENT)
Dept: PRIMARY CARE CLINIC | Age: 63
End: 2018-08-22

## 2018-08-22 NOTE — TELEPHONE ENCOUNTER
daria calling needed the doctor to call to give a verbal yes to move forward on an appeal for the patient to receive skilled nursing, OT,Chas advise at the number [phone] ext [phone]

## 2018-08-27 ENCOUNTER — TELEPHONE (OUTPATIENT)
Dept: PRIMARY CARE CLINIC | Age: 63
End: 2018-08-27

## 2018-08-27 NOTE — TELEPHONE ENCOUNTER
Patient states she has not received the lifestyle meter or the medication for her stomach that you ordered for her. She is also requesting a blood pressure monitor.   Please advise

## 2018-08-29 RX ORDER — FLASH GLUCOSE SENSOR
1 KIT MISCELLANEOUS 3 TIMES DAILY
Qty: 1 DEVICE | Refills: 0 | Status: SHIPPED | OUTPATIENT
Start: 2018-08-29 | End: 2018-11-07

## 2018-08-29 RX ORDER — BLOOD PRESSURE TEST KIT
KIT MISCELLANEOUS
Qty: 1 KIT | Refills: 0 | Status: SHIPPED | OUTPATIENT
Start: 2018-08-29 | End: 2018-11-07

## 2018-08-29 RX ORDER — FLASH GLUCOSE SENSOR
1 KIT MISCELLANEOUS 3 TIMES DAILY
Qty: 1 EACH | Refills: 0 | Status: SHIPPED | OUTPATIENT
Start: 2018-08-29 | End: 2018-11-07

## 2018-08-30 DIAGNOSIS — G43.119 INTRACTABLE MIGRAINE WITH AURA WITHOUT STATUS MIGRAINOSUS: ICD-10-CM

## 2018-08-30 DIAGNOSIS — G89.4 CHRONIC PAIN SYNDROME: ICD-10-CM

## 2018-08-30 RX ORDER — SUMATRIPTAN 50 MG/1
TABLET, FILM COATED ORAL
Qty: 9 TABLET | OUTPATIENT
Start: 2018-08-30

## 2018-09-07 DIAGNOSIS — G43.009 NONINTRACTABLE MIGRAINE, UNSPECIFIED MIGRAINE TYPE: ICD-10-CM

## 2018-09-07 RX ORDER — SUMATRIPTAN 100 MG/1
100 TABLET, FILM COATED ORAL
Qty: 9 TABLET | Refills: 2 | Status: ON HOLD | OUTPATIENT
Start: 2018-09-07 | End: 2018-11-01 | Stop reason: HOSPADM

## 2018-09-10 RX ORDER — PEN NEEDLE, DIABETIC 29 G X1/2"
NEEDLE, DISPOSABLE MISCELLANEOUS
Qty: 100 EACH | Refills: 4 | Status: SHIPPED | OUTPATIENT
Start: 2018-09-10 | End: 2018-11-07

## 2018-09-10 RX ORDER — INSULIN ASPART 100 [IU]/ML
INJECTION, SOLUTION INTRAVENOUS; SUBCUTANEOUS
Qty: 15 ML | Refills: 4 | Status: SHIPPED | OUTPATIENT
Start: 2018-09-10 | End: 2018-11-07 | Stop reason: SDUPTHER

## 2018-09-12 ENCOUNTER — OFFICE VISIT (OUTPATIENT)
Dept: PAIN MANAGEMENT | Age: 63
End: 2018-09-12

## 2018-09-12 VITALS
HEART RATE: 84 BPM | WEIGHT: 121 LBS | DIASTOLIC BLOOD PRESSURE: 83 MMHG | SYSTOLIC BLOOD PRESSURE: 156 MMHG | BODY MASS INDEX: 23.63 KG/M2

## 2018-09-12 DIAGNOSIS — E11.40 CHRONIC PAINFUL DIABETIC NEUROPATHY (HCC): ICD-10-CM

## 2018-09-12 DIAGNOSIS — F33.1 MODERATE EPISODE OF RECURRENT MAJOR DEPRESSIVE DISORDER (HCC): ICD-10-CM

## 2018-09-12 DIAGNOSIS — F51.01 PRIMARY INSOMNIA: ICD-10-CM

## 2018-09-12 DIAGNOSIS — G43.119 INTRACTABLE MIGRAINE WITH AURA WITHOUT STATUS MIGRAINOSUS: ICD-10-CM

## 2018-09-12 DIAGNOSIS — M79.7 FIBROMYALGIA: ICD-10-CM

## 2018-09-12 DIAGNOSIS — G89.4 CHRONIC PAIN SYNDROME: ICD-10-CM

## 2018-09-12 DIAGNOSIS — M31.6 GIANT CELL ARTERITIS (HCC): ICD-10-CM

## 2018-09-12 DIAGNOSIS — F33.41 RECURRENT MAJOR DEPRESSIVE DISORDER, IN PARTIAL REMISSION (HCC): ICD-10-CM

## 2018-09-12 PROCEDURE — 2022F DILAT RTA XM EVC RTNOPTHY: CPT | Performed by: INTERNAL MEDICINE

## 2018-09-12 PROCEDURE — G8420 CALC BMI NORM PARAMETERS: HCPCS | Performed by: INTERNAL MEDICINE

## 2018-09-12 PROCEDURE — G8427 DOCREV CUR MEDS BY ELIG CLIN: HCPCS | Performed by: INTERNAL MEDICINE

## 2018-09-12 PROCEDURE — 3046F HEMOGLOBIN A1C LEVEL >9.0%: CPT | Performed by: INTERNAL MEDICINE

## 2018-09-12 PROCEDURE — 99214 OFFICE O/P EST MOD 30 MIN: CPT | Performed by: INTERNAL MEDICINE

## 2018-09-12 PROCEDURE — G8598 ASA/ANTIPLAT THER USED: HCPCS | Performed by: INTERNAL MEDICINE

## 2018-09-12 PROCEDURE — 3017F COLORECTAL CA SCREEN DOC REV: CPT | Performed by: INTERNAL MEDICINE

## 2018-09-12 PROCEDURE — 1036F TOBACCO NON-USER: CPT | Performed by: INTERNAL MEDICINE

## 2018-09-12 RX ORDER — DIVALPROEX SODIUM 500 MG/1
TABLET, EXTENDED RELEASE ORAL
Qty: 60 TABLET | Refills: 1 | Status: SHIPPED | OUTPATIENT
Start: 2018-09-12 | End: 2018-10-09 | Stop reason: SDUPTHER

## 2018-09-12 RX ORDER — OXYCODONE AND ACETAMINOPHEN 7.5; 325 MG/1; MG/1
1 TABLET ORAL EVERY 6 HOURS PRN
Qty: 84 TABLET | Refills: 0 | Status: SHIPPED | OUTPATIENT
Start: 2018-09-12 | End: 2018-10-09 | Stop reason: SDUPTHER

## 2018-09-12 RX ORDER — TIZANIDINE 4 MG/1
TABLET ORAL
Qty: 60 TABLET | Refills: 1 | Status: SHIPPED | OUTPATIENT
Start: 2018-09-12 | End: 2018-10-09 | Stop reason: SDUPTHER

## 2018-09-12 RX ORDER — BUPROPION HYDROCHLORIDE 150 MG/1
150 TABLET ORAL EVERY MORNING
Qty: 30 TABLET | Refills: 1 | Status: SHIPPED | OUTPATIENT
Start: 2018-09-12 | End: 2018-10-09 | Stop reason: SDUPTHER

## 2018-09-12 RX ORDER — BUPRENORPHINE 5 UG/H
1 PATCH TRANSDERMAL WEEKLY
Qty: 4 PATCH | Refills: 0 | Status: SHIPPED | OUTPATIENT
Start: 2018-09-12 | End: 2018-10-09 | Stop reason: SDUPTHER

## 2018-09-12 RX ORDER — TOPIRAMATE 100 MG/1
100 TABLET, FILM COATED ORAL 2 TIMES DAILY
Qty: 60 TABLET | Refills: 1 | Status: SHIPPED | OUTPATIENT
Start: 2018-09-12 | End: 2018-10-09 | Stop reason: SDUPTHER

## 2018-09-12 RX ORDER — DULOXETIN HYDROCHLORIDE 60 MG/1
60 CAPSULE, DELAYED RELEASE ORAL DAILY
Qty: 30 CAPSULE | Refills: 0 | Status: SHIPPED | OUTPATIENT
Start: 2018-09-12 | End: 2018-10-09 | Stop reason: SDUPTHER

## 2018-09-12 NOTE — PROGRESS NOTES
too long. She mentions her fibromyalgia is starting to kick in, whole body is in pain, just lay and cry and hurts really bad. Sleep is poor,  poor sleep latency, averages 2-3 hours of sleep at night. Intermittent, non restorative. Does not feel rested in AM. Complains of feeling sleepy  during the day. She states that her moods have been depressed, tearful, labile with c/o loss of energy, having occasional crying spells. Denies being suicidal or homicidal, because of the pain. She say she lives with her daughter         ALLERGIES/PAST MED/FAM/SOC HISTORY: Ms. Charity Dahl allergies, past medical, family and social history were reviewed in the chart and pertinent information also listed below. Social History     Social History    Marital status:      Spouse name: N/A    Number of children: 6    Years of education: N/A     Occupational History    Not on file. Social History Main Topics    Smoking status: Former Smoker     Packs/day: 0.50     Years: 35.00     Types: Cigarettes     Quit date: 4/26/2015    Smokeless tobacco: Never Used      Comment: 5/13/15 has not smoked since hospitalization - kh    Alcohol use No    Drug use: No    Sexual activity: Yes     Partners: Male     Other Topics Concern    Not on file     Social History Narrative    No narrative on file      Ms. Meza current medications are   Outpatient Medications Prior to Visit   Medication Sig Dispense Refill    ULTICARE MINI PEN NEEDLES 31G X 6 MM MISC USE THREE TIMES A  each 4    NOVOLOG FLEXPEN 100 UNIT/ML injection pen BLOOD SUGAR <150 GIVE 5  UNITS:151-200 7UNITS:    201-250 10 UNITS: 820-08913 UNITS: 301-350 14UNITS>351 15 UNITS BEFORE MEAL 15 mL 4    nitroGLYCERIN (NITROSTAT) 0.3 MG SL tablet Place 1 tablet under the tongue every 5 minutes as needed for Chest pain 30 tablet 0    Blood Pressure KIT HTN  110 1 kit 0    Continuous Blood Gluc  (FREESTYLE LISSETT READER) DAYO 1 Device by Does not apply route three times daily 1 Device 0    Continuous Blood Gluc Sensor (FREESTYLE LISSETT SENSOR SYSTEM) MISC 1 Device by Does not apply route three times daily 250.00 E 11.9 1 each 0    tiZANidine (ZANAFLEX) 4 MG tablet Take 1/2 tablet in the morning and 1 tablet at night 60 tablet 1    divalproex (DEPAKOTE ER) 500 MG extended release tablet take 1 tablet by mouth BID 60 tablet 1    topiramate (TOPAMAX) 100 MG tablet Take 1 tablet by mouth 2 times daily 60 tablet 1    buPROPion (WELLBUTRIN XL) 150 MG extended release tablet Take 1 tablet by mouth every morning 30 tablet 1    DULoxetine (CYMBALTA) 30 MG extended release capsule Take 1 capsule by mouth daily 30 capsule 1    oxyCODONE-acetaminophen (PERCOCET) 7.5-325 MG per tablet Take 1 tablet by mouth every 6 hours as needed for Pain (max 3-4 per day) for up to 30 days. . 106 tablet 0    QUEtiapine (SEROQUEL) 50 MG tablet Take 1 tablet by mouth nightly 60 tablet 1    amLODIPine (NORVASC) 10 MG tablet Take 0.5 tablets by mouth daily 30 tablet 5    metoprolol succinate (TOPROL XL) 25 MG extended release tablet Take 0.5 tablets by mouth 2 times daily 60 tablet 5    isosorbide mononitrate (IMDUR) 30 MG extended release tablet Take 1 tablet by mouth daily 30 tablet 5    torsemide (DEMADEX) 20 MG tablet Take 1 tablet by mouth daily 30 tablet 5    hydrALAZINE (APRESOLINE) 50 MG tablet Take 1 tablet by mouth 2 times daily 60 tablet 5    clopidogrel (PLAVIX) 75 MG tablet Take 1 tablet by mouth daily 90 tablet 5    ranolazine (RANEXA) 1000 MG extended release tablet Take 1 tablet by mouth 2 times daily 60 tablet 3    aspirin EC 81 MG EC tablet Take 1 tablet by mouth daily 90 tablet 3    Dulaglutide (TRULICITY) 1.5 XQ/7.6JQ SOPN Inject 1.5 mg into the skin once a week 4 pen 3    insulin glargine (LANTUS SOLOSTAR) 100 UNIT/ML injection pen Inject 40-50 Units into the skin nightly 40 U in am + 50 U in pm 5 pen 3    ranitidine (ZANTAC) 150 MG tablet Take 1 tablet by mouth 2 times this goal and reassessment and adjustment of goals/treatment have been made. 2. Improving sleep to 6-7 hours a night. Improve mood/ anxiety and depression symptoms such as crying spells, low energy, problems with concentration, motivation. - she is not showing any significant progress/or showing regression  towards this goal and reassessment and adjustment of goals/treatment have been made. 3. Reduction of reliance on opioid analgesia/more appropriate opioid use. - she is showing progression towards this treatment goal with the current regimen. Risks and benefits of the medications and other alternative treatments have been/were  discussed with the patient. Any questions on the  common side effects of these medications were also answered. She was advised against drinking alcohol with the narcotic pain medicines, advised against driving or handling machinery when  starting or adjusting the dose of medicines, feeling groggy or drowsy, or if having any cognitive issues related to the current medications. Sheis fully aware of the risk of overdose and death, if medicines are misused and not taken as prescribed. If she develops new symptoms or if the symptoms worsen, she was told to call the office. Thank you for allowing me to participate in the care of this patient. Yumiko Trevino MD.    Cc: MD STEVEN Cleaning, Juju Dominique, scribing for in the presence  of Dr. Yumiko Trevino.   09/12/18  10:43 AM  Nadine Carter Assistant  I, Dr. Yumiko Trevino, personally performed the services described in this documentation as scribed by  Juju Dominique MA in my presence and it is both accurate and complete

## 2018-09-14 ENCOUNTER — OFFICE VISIT (OUTPATIENT)
Dept: PRIMARY CARE CLINIC | Age: 63
End: 2018-09-14

## 2018-09-14 VITALS
HEIGHT: 60 IN | WEIGHT: 122 LBS | TEMPERATURE: 98.2 F | DIASTOLIC BLOOD PRESSURE: 60 MMHG | OXYGEN SATURATION: 100 % | BODY MASS INDEX: 23.95 KG/M2 | SYSTOLIC BLOOD PRESSURE: 120 MMHG | HEART RATE: 85 BPM

## 2018-09-14 DIAGNOSIS — Z23 FLU VACCINE NEED: ICD-10-CM

## 2018-09-14 DIAGNOSIS — E11.22 TYPE 2 DIABETES MELLITUS WITH CHRONIC KIDNEY DISEASE, WITHOUT LONG-TERM CURRENT USE OF INSULIN, UNSPECIFIED CKD STAGE (HCC): ICD-10-CM

## 2018-09-14 DIAGNOSIS — Z09 HOSPITAL DISCHARGE FOLLOW-UP: Primary | ICD-10-CM

## 2018-09-14 DIAGNOSIS — I10 ESSENTIAL HYPERTENSION: ICD-10-CM

## 2018-09-14 PROCEDURE — G8420 CALC BMI NORM PARAMETERS: HCPCS | Performed by: INTERNAL MEDICINE

## 2018-09-14 PROCEDURE — G8427 DOCREV CUR MEDS BY ELIG CLIN: HCPCS | Performed by: INTERNAL MEDICINE

## 2018-09-14 PROCEDURE — 2022F DILAT RTA XM EVC RTNOPTHY: CPT | Performed by: INTERNAL MEDICINE

## 2018-09-14 PROCEDURE — 99214 OFFICE O/P EST MOD 30 MIN: CPT | Performed by: INTERNAL MEDICINE

## 2018-09-14 PROCEDURE — G0008 ADMIN INFLUENZA VIRUS VAC: HCPCS | Performed by: INTERNAL MEDICINE

## 2018-09-14 PROCEDURE — G8598 ASA/ANTIPLAT THER USED: HCPCS | Performed by: INTERNAL MEDICINE

## 2018-09-14 PROCEDURE — 1036F TOBACCO NON-USER: CPT | Performed by: INTERNAL MEDICINE

## 2018-09-14 PROCEDURE — 3046F HEMOGLOBIN A1C LEVEL >9.0%: CPT | Performed by: INTERNAL MEDICINE

## 2018-09-14 PROCEDURE — 3017F COLORECTAL CA SCREEN DOC REV: CPT | Performed by: INTERNAL MEDICINE

## 2018-09-14 PROCEDURE — 90686 IIV4 VACC NO PRSV 0.5 ML IM: CPT | Performed by: INTERNAL MEDICINE

## 2018-09-14 ASSESSMENT — ENCOUNTER SYMPTOMS
NAUSEA: 0
EYE REDNESS: 0
ABDOMINAL DISTENTION: 0
EYE PAIN: 0
VISUAL CHANGE: 0
PHOTOPHOBIA: 0
SINUS PRESSURE: 0
CHEST TIGHTNESS: 0
SORE THROAT: 0
GASTROINTESTINAL NEGATIVE: 1
EYES NEGATIVE: 1
ABDOMINAL PAIN: 0

## 2018-09-18 ENCOUNTER — TELEPHONE (OUTPATIENT)
Dept: PAIN MANAGEMENT | Age: 63
End: 2018-09-18

## 2018-10-05 ENCOUNTER — OFFICE VISIT (OUTPATIENT)
Dept: CARDIOLOGY CLINIC | Age: 63
End: 2018-10-05
Payer: COMMERCIAL

## 2018-10-05 VITALS
HEIGHT: 60 IN | BODY MASS INDEX: 24.15 KG/M2 | DIASTOLIC BLOOD PRESSURE: 68 MMHG | WEIGHT: 123 LBS | OXYGEN SATURATION: 99 % | HEART RATE: 60 BPM | SYSTOLIC BLOOD PRESSURE: 124 MMHG

## 2018-10-05 DIAGNOSIS — I48.0 PAROXYSMAL ATRIAL FIBRILLATION (HCC): ICD-10-CM

## 2018-10-05 DIAGNOSIS — E78.2 MIXED HYPERLIPIDEMIA: ICD-10-CM

## 2018-10-05 DIAGNOSIS — I50.32 CHRONIC DIASTOLIC HEART FAILURE (HCC): ICD-10-CM

## 2018-10-05 DIAGNOSIS — I10 ESSENTIAL HYPERTENSION: ICD-10-CM

## 2018-10-05 DIAGNOSIS — I25.118 CORONARY ARTERY DISEASE OF NATIVE ARTERY OF NATIVE HEART WITH STABLE ANGINA PECTORIS (HCC): Primary | ICD-10-CM

## 2018-10-05 PROCEDURE — G8420 CALC BMI NORM PARAMETERS: HCPCS | Performed by: INTERNAL MEDICINE

## 2018-10-05 PROCEDURE — 3017F COLORECTAL CA SCREEN DOC REV: CPT | Performed by: INTERNAL MEDICINE

## 2018-10-05 PROCEDURE — 93000 ELECTROCARDIOGRAM COMPLETE: CPT | Performed by: INTERNAL MEDICINE

## 2018-10-05 PROCEDURE — 99213 OFFICE O/P EST LOW 20 MIN: CPT | Performed by: INTERNAL MEDICINE

## 2018-10-05 PROCEDURE — G8427 DOCREV CUR MEDS BY ELIG CLIN: HCPCS | Performed by: INTERNAL MEDICINE

## 2018-10-05 PROCEDURE — G8598 ASA/ANTIPLAT THER USED: HCPCS | Performed by: INTERNAL MEDICINE

## 2018-10-05 PROCEDURE — 1036F TOBACCO NON-USER: CPT | Performed by: INTERNAL MEDICINE

## 2018-10-05 PROCEDURE — G8482 FLU IMMUNIZE ORDER/ADMIN: HCPCS | Performed by: INTERNAL MEDICINE

## 2018-10-05 NOTE — PROGRESS NOTES
Hyperlipidemia     Hypertension     IBS (irritable bowel syndrome)     Inferior vena cava occlusion (HCC)     Irritable bowel syndrome     Keratitis     Neuromuscular disorder (HCC)     fibromyalgia    Neuropathy     Superior vena cava obstruction     Temporal arteritis ([facility] 2/24/2014    Type II or unspecified type diabetes mellitus without mention of complication, not stated as uncontrolled      Past Surgical History:   Procedure Laterality Date    ABLATION OF DYSRHYTHMIC FOCUS      ARTERY BIOPSY Right 04/23/2014    RIGHT TEMPORAL ARTERY BIOPSY    CATARACT REMOVAL Bilateral     CHOLECYSTECTOMY      CORONARY ANGIOPLASTY WITH STENT PLACEMENT      CORONARY ANGIOPLASTY WITH STENT PLACEMENT      HYSTERECTOMY      JOINT REPLACEMENT Right     KNEE ARTHROSCOPY Right     KNEE SURGERY      right knee replacement    TUNNELED VENOUS PORT PLACEMENT      left thigh.  VASCULAR SURGERY       Social History   Substance Use Topics    Smoking status: Former Smoker     Packs/day: 0.50     Years: 35.00     Types: Cigarettes     Quit date: 4/26/2015    Smokeless tobacco: Never Used      Comment: 5/13/15 has not smoked since hospitalization - kh    Alcohol use No       Review of Systems:   Constitutional: No significant change in weight or weakness. Negative fatigue  HEENT: No change in vision or ringing in the ears. Respiratory: No PND, orthopnea or cough. + dyspnea  Cardiovascular: See HPI + chest pain with exertion   GI: No n/v, abdominal pain or changes in bowel habits. No melena, no hematochezia  : No dysuria or hematuria. Skin: No rash or new skin lesions. Musculoskeletal: No new muscle or joint pain. Neurological: No syncope or TIA-like symptoms. Psychiatric: No anxiety, insomnia or depression    Physical Exam:  /68 (Site: Right Upper Arm, Position: Sitting)   Pulse 60   Ht 5' (1.524 m)   Wt 123 lb (55.8 kg) Comment: shoes on  SpO2 99%   Breastfeeding?  No   BMI 24.02 kg/m² the presence of Dr. Sierra Pantoja MD by Jude Maldonado RN. Physician Attestation:  The scribes documentation has been prepared under my direction and personally reviewed by me in its entirety. I confirm that the note above accurately reflects all work, treatment, procedures, and medical decision making performed by me.

## 2018-10-05 NOTE — PATIENT INSTRUCTIONS
increase your steps. Once you get there, set a higher goal. Aim for 10,000 steps a day. · If the weather keeps you from walking outside, go for walks at the mall with a friend. Local schools and churches may have indoor gyms where you can walk. Fitting a walk into your workday  · Park several blocks away from work, or get off the bus a few stops early. · Use the stairs instead of the elevator, at least for a few floors. · Suggest holding meetings with colleagues during a walk inside or outside the building. · Use the restroom that is the farthest from your desk or workstation. · Use your morning and afternoon breaks to take quick 15-minute walks. Staying safe  · Know your surroundings. Walk in a well-lighted, safe place. If it is dark, walk with a partner. Wear light-colored clothing. If you can, buy a vest or jacket that reflects light. · Carry a cell phone for emergencies. · Drink plenty of water. Take a water bottle with you when you walk. This is very important if it is hot out. · Be careful not to slip on wet or icy ground. You can buy \"grippers\" for your shoes to help keep you from slipping. · Pay attention to your walking surface. Use sidewalks and paths. · If you have breathing problems like asthma or COPD, ask your doctor when it is safe for you to walk outdoors. Cold, dry air, smog, pollen, or other things in the air could cause breathing problems. Where can you learn more? Go to https://Frontstartted.healthWasatch [employer]ics. org and sign in to your EDAN account. Enter R159 in the Viewsy box to learn more about \"Walking for Exercise: Care Instructions. \"     If you do not have an account, please click on the \"Sign Up Now\" link. Current as of: December 7, 2017  Content Version: 11.7  © 7975-3642 [employer]. Care instructions adapted under license by [facility] ([facility]).  If you have questions about a medical condition or this instruction, always ask your healthcare professional. [address] any warranty or liability for your use of this information. Patient Education        Heart-Healthy Diet: Care Instructions  Your Care Instructions    A heart-healthy diet has lots of vegetables, fruits, nuts, beans, and whole grains, and is low in salt. It limits foods that are high in saturated fat, such as meats, cheeses, and fried foods. It may be hard to change your diet, but even small changes can lower your risk of heart attack and heart disease. Follow-up care is a key part of your treatment and safety. Be sure to make and go to all appointments, and call your doctor if you are having problems. It's also a good idea to know your test results and keep a list of the medicines you take. How can you care for yourself at home? Watch your portions  · Learn what a serving is. A \"serving\" and a \"portion\" are not always the same thing. Make sure that you are not eating larger portions than are recommended. For example, a serving of pasta is ½ cup. A serving size of meat is 2 to 3 ounces. A 3-ounce serving is about the size of a deck of cards. Measure serving sizes until you are good at [place]" them. Keep in mind that restaurants often serve portions that are 2 or 3 times the size of one serving. · To keep your energy level up and keep you from feeling hungry, eat often but in smaller portions. · Eat only the number of calories you need to stay at a healthy weight. If you need to lose weight, eat fewer calories than your body burns (through exercise and other physical activity). Eat more fruits and vegetables  · Eat a variety of fruit and vegetables every day. Dark green, deep orange, red, or yellow fruits and vegetables are especially good for you. Examples include spinach, carrots, peaches, and berries. · Keep carrots, celery, and other veggies handy for snacks.  Buy fruit that is in season and store it where you can see it so that you will be tempted may be sodium already in the food naturally. ¨ \"Sodium-free\" means a serving has less than 5 mg of sodium. ¨ \"Very low sodium\" means a serving has 35 mg or less of sodium. ¨ \"Low-sodium\" means a serving has 140 mg or less of sodium. · \"Reduced-sodium\" means that there is 25% less sodium than what the food normally has. This is still usually too much sodium. Try not to buy foods with this on the label. · Buy fresh vegetables, or frozen vegetables without added sauces. Buy low-sodium versions of canned vegetables, soups, and other canned goods. Where can you learn more? Go to https://Kruglepe[employer] Manufacturing.[employer]. org and sign in to your [employer] account. Enter 26 [id number] in the MOBi-LEARN box to learn more about \"How to Read a Food Label to Limit Sodium: Care Instructions. \"     If you do not have an account, please click on the \"Sign Up Now\" link. Current as of: May 12, 2017  Content Version: 11.7  © 2312-9325 [employer]. Care instructions adapted under license by [facility] ([facility]). If you have questions about a medical condition or this instruction, always ask your healthcare professional. [address] any warranty or liability for your use of this information. Patient Education        Learning About Diabetes and Coronary Artery Disease  How are diabetes and heart disease connected? Many people think diabetes and heart disease go hand in hand. But having diabetes doesn't have to mean that you are going to have a heart attack someday. Healthy living can help prevent many of the problems that come with both diabetes and heart disease. For some people, diabetes can cause problems in your body that may lead to heart disease. Diabetes can make the problems of heart disease worse. Experts do not fully understand how diabetes affects the heart.  Many things can lead to heart disease, including high blood sugar, insulin resistance, high cholesterol, and high blood stop-smoking programs and medicines. These can increase your chances of quitting for good. · Your doctor may talk with you about taking medicines for your heart. For example, your doctor may suggest taking a statin or daily aspirin. Where can you learn more? Go to https://mariama.Smallable. org and sign in to your [employer] account. Enter A444 in the [employer] box to learn more about \"Learning About Diabetes and Coronary Artery Disease. \"     If you do not have an account, please click on the \"Sign Up Now\" link. Current as of: December 7, 2017  Content Version: 11.7  © 4692-7761 two.42.solutions, Incorporated. Care instructions adapted under license by [facility] ([facility]). If you have questions about a medical condition or this instruction, always ask your healthcare professional. [address] any warranty or liability for your use of this information.

## 2018-10-09 ENCOUNTER — OFFICE VISIT (OUTPATIENT)
Dept: PAIN MANAGEMENT | Age: 63
End: 2018-10-09
Payer: COMMERCIAL

## 2018-10-09 VITALS
BODY MASS INDEX: 24.22 KG/M2 | WEIGHT: 124 LBS | DIASTOLIC BLOOD PRESSURE: 74 MMHG | HEART RATE: 67 BPM | SYSTOLIC BLOOD PRESSURE: 137 MMHG

## 2018-10-09 DIAGNOSIS — G43.119 INTRACTABLE MIGRAINE WITH AURA WITHOUT STATUS MIGRAINOSUS: ICD-10-CM

## 2018-10-09 DIAGNOSIS — F33.41 RECURRENT MAJOR DEPRESSIVE DISORDER, IN PARTIAL REMISSION (HCC): ICD-10-CM

## 2018-10-09 DIAGNOSIS — M79.7 FIBROMYALGIA: ICD-10-CM

## 2018-10-09 DIAGNOSIS — M75.81 TENDINITIS OF RIGHT ROTATOR CUFF: ICD-10-CM

## 2018-10-09 DIAGNOSIS — F51.01 PRIMARY INSOMNIA: ICD-10-CM

## 2018-10-09 DIAGNOSIS — F33.1 MODERATE EPISODE OF RECURRENT MAJOR DEPRESSIVE DISORDER (HCC): ICD-10-CM

## 2018-10-09 DIAGNOSIS — G89.4 CHRONIC PAIN SYNDROME: ICD-10-CM

## 2018-10-09 DIAGNOSIS — E11.40 CHRONIC PAINFUL DIABETIC NEUROPATHY (HCC): ICD-10-CM

## 2018-10-09 DIAGNOSIS — M31.6 GIANT CELL ARTERITIS (HCC): ICD-10-CM

## 2018-10-09 DIAGNOSIS — K21.9 GASTRO-ESOPHAGEAL REFLUX DISEASE WITHOUT ESOPHAGITIS: ICD-10-CM

## 2018-10-09 PROCEDURE — 1036F TOBACCO NON-USER: CPT | Performed by: INTERNAL MEDICINE

## 2018-10-09 PROCEDURE — 20610 DRAIN/INJ JOINT/BURSA W/O US: CPT | Performed by: INTERNAL MEDICINE

## 2018-10-09 PROCEDURE — 3017F COLORECTAL CA SCREEN DOC REV: CPT | Performed by: INTERNAL MEDICINE

## 2018-10-09 PROCEDURE — 3046F HEMOGLOBIN A1C LEVEL >9.0%: CPT | Performed by: INTERNAL MEDICINE

## 2018-10-09 PROCEDURE — 99214 OFFICE O/P EST MOD 30 MIN: CPT | Performed by: INTERNAL MEDICINE

## 2018-10-09 PROCEDURE — G8420 CALC BMI NORM PARAMETERS: HCPCS | Performed by: INTERNAL MEDICINE

## 2018-10-09 PROCEDURE — G8598 ASA/ANTIPLAT THER USED: HCPCS | Performed by: INTERNAL MEDICINE

## 2018-10-09 PROCEDURE — 2022F DILAT RTA XM EVC RTNOPTHY: CPT | Performed by: INTERNAL MEDICINE

## 2018-10-09 PROCEDURE — G8482 FLU IMMUNIZE ORDER/ADMIN: HCPCS | Performed by: INTERNAL MEDICINE

## 2018-10-09 PROCEDURE — G8427 DOCREV CUR MEDS BY ELIG CLIN: HCPCS | Performed by: INTERNAL MEDICINE

## 2018-10-09 RX ORDER — OXYCODONE AND ACETAMINOPHEN 7.5; 325 MG/1; MG/1
1 TABLET ORAL EVERY 6 HOURS PRN
Qty: 84 TABLET | Refills: 0 | Status: SHIPPED | OUTPATIENT
Start: 2018-10-09 | End: 2018-11-06 | Stop reason: SDUPTHER

## 2018-10-09 RX ORDER — DULOXETIN HYDROCHLORIDE 60 MG/1
60 CAPSULE, DELAYED RELEASE ORAL DAILY
Qty: 30 CAPSULE | Refills: 0 | Status: SHIPPED | OUTPATIENT
Start: 2018-10-09 | End: 2018-11-06 | Stop reason: SDUPTHER

## 2018-10-09 RX ORDER — TRIAMCINOLONE ACETONIDE 40 MG/ML
40 INJECTION, SUSPENSION INTRA-ARTICULAR; INTRAMUSCULAR ONCE
Status: COMPLETED | OUTPATIENT
Start: 2018-10-09 | End: 2018-10-09

## 2018-10-09 RX ORDER — BUPROPION HYDROCHLORIDE 150 MG/1
150 TABLET ORAL EVERY MORNING
Qty: 30 TABLET | Refills: 1 | Status: SHIPPED | OUTPATIENT
Start: 2018-10-09 | End: 2018-11-06 | Stop reason: SDUPTHER

## 2018-10-09 RX ORDER — BUPRENORPHINE 5 UG/H
1 PATCH TRANSDERMAL WEEKLY
Qty: 4 PATCH | Refills: 0 | Status: SHIPPED | OUTPATIENT
Start: 2018-10-09 | End: 2018-11-06 | Stop reason: SDUPTHER

## 2018-10-09 RX ORDER — DIVALPROEX SODIUM 500 MG/1
TABLET, EXTENDED RELEASE ORAL
Qty: 60 TABLET | Refills: 1 | Status: SHIPPED | OUTPATIENT
Start: 2018-10-09 | End: 2018-11-07

## 2018-10-09 RX ORDER — TOPIRAMATE 100 MG/1
100 TABLET, FILM COATED ORAL 2 TIMES DAILY
Qty: 60 TABLET | Refills: 1 | Status: SHIPPED | OUTPATIENT
Start: 2018-10-09 | End: 2018-11-06 | Stop reason: SDUPTHER

## 2018-10-09 RX ORDER — TIZANIDINE 4 MG/1
TABLET ORAL
Qty: 60 TABLET | Refills: 1 | Status: SHIPPED | OUTPATIENT
Start: 2018-10-09 | End: 2018-11-06 | Stop reason: SDUPTHER

## 2018-10-09 RX ORDER — QUETIAPINE FUMARATE 50 MG/1
50 TABLET, FILM COATED ORAL NIGHTLY
Qty: 60 TABLET | Refills: 1 | Status: SHIPPED | OUTPATIENT
Start: 2018-10-09 | End: 2018-11-06 | Stop reason: SDUPTHER

## 2018-10-09 RX ADMIN — TRIAMCINOLONE ACETONIDE 40 MG: 40 INJECTION, SUSPENSION INTRA-ARTICULAR; INTRAMUSCULAR at 10:53

## 2018-10-18 ENCOUNTER — TELEPHONE (OUTPATIENT)
Dept: PRIMARY CARE CLINIC | Age: 63
End: 2018-10-18

## 2018-10-22 ENCOUNTER — HOSPITAL ENCOUNTER (OUTPATIENT)
Dept: MAMMOGRAPHY | Age: 63
Discharge: HOME | End: 2018-10-27
Payer: COMMERCIAL

## 2018-10-22 DIAGNOSIS — Z12.31 VISIT FOR SCREENING MAMMOGRAM: ICD-10-CM

## 2018-10-26 ENCOUNTER — HOSPITAL ENCOUNTER (EMERGENCY)
Age: 63
Discharge: HOME | End: 2018-10-26
Attending: EMERGENCY MEDICINE
Payer: COMMERCIAL

## 2018-10-26 ENCOUNTER — APPOINTMENT (OUTPATIENT)
Dept: GENERAL RADIOLOGY | Age: 63
End: 2018-10-26
Payer: COMMERCIAL

## 2018-10-26 VITALS
DIASTOLIC BLOOD PRESSURE: 55 MMHG | BODY MASS INDEX: 24.37 KG/M2 | WEIGHT: 124.78 LBS | TEMPERATURE: 98.5 F | HEART RATE: 66 BPM | RESPIRATION RATE: 12 BRPM | OXYGEN SATURATION: 100 % | SYSTOLIC BLOOD PRESSURE: 127 MMHG

## 2018-10-26 DIAGNOSIS — R07.9 CHEST PAIN, UNSPECIFIED TYPE: Primary | ICD-10-CM

## 2018-10-26 LAB
A/G RATIO: 1 (ref 1.1–2.2)
ALBUMIN SERPL-MCNC: 3.9 G/DL (ref 3.4–5)
ALP BLD-CCNC: 106 U/L (ref 40–129)
ALT SERPL-CCNC: 37 U/L (ref 10–40)
ANION GAP SERPL CALCULATED.3IONS-SCNC: 16 MMOL/L (ref 3–16)
AST SERPL-CCNC: 38 U/L (ref 15–37)
BASOPHILS ABSOLUTE: 0.1 K/UL (ref 0–0.2)
BASOPHILS RELATIVE PERCENT: 0.9 %
BILIRUB SERPL-MCNC: 0.3 MG/DL (ref 0–1)
BUN BLDV-MCNC: 31 MG/DL (ref 7–20)
CALCIUM SERPL-MCNC: 9.2 MG/DL (ref 8.3–10.6)
CHLORIDE BLD-SCNC: 102 MMOL/L (ref 99–110)
CO2: 19 MMOL/L (ref 21–32)
CREAT SERPL-MCNC: 1.4 MG/DL (ref 0.6–1.2)
EKG ATRIAL RATE: 71 BPM
EKG ATRIAL RATE: 84 BPM
EKG DIAGNOSIS: NORMAL
EKG DIAGNOSIS: NORMAL
EKG P AXIS: 15 DEGREES
EKG P AXIS: 4 DEGREES
EKG P-R INTERVAL: 112 MS
EKG P-R INTERVAL: 92 MS
EKG Q-T INTERVAL: 346 MS
EKG Q-T INTERVAL: 422 MS
EKG QRS DURATION: 86 MS
EKG QRS DURATION: 90 MS
EKG QTC CALCULATION (BAZETT): 408 MS
EKG QTC CALCULATION (BAZETT): 458 MS
EKG R AXIS: 37 DEGREES
EKG R AXIS: 51 DEGREES
EKG T AXIS: 76 DEGREES
EKG T AXIS: 85 DEGREES
EKG VENTRICULAR RATE: 71 BPM
EKG VENTRICULAR RATE: 84 BPM
EOSINOPHILS ABSOLUTE: 0.1 K/UL (ref 0–0.6)
EOSINOPHILS RELATIVE PERCENT: 1.5 %
GFR AFRICAN AMERICAN: 46
GFR NON-AFRICAN AMERICAN: 38
GLOBULIN: 3.9 G/DL
GLUCOSE BLD-MCNC: 278 MG/DL (ref 70–99)
HCT VFR BLD CALC: 33.1 % (ref 36–48)
HEMOGLOBIN: 10.9 G/DL (ref 12–16)
LYMPHOCYTES ABSOLUTE: 1.1 K/UL (ref 1–5.1)
LYMPHOCYTES RELATIVE PERCENT: 16 %
MCH RBC QN AUTO: 33 PG (ref 26–34)
MCHC RBC AUTO-ENTMCNC: 33.1 G/DL (ref 31–36)
MCV RBC AUTO: 99.9 FL (ref 80–100)
MONOCYTES ABSOLUTE: 0.4 K/UL (ref 0–1.3)
MONOCYTES RELATIVE PERCENT: 5.2 %
NEUTROPHILS ABSOLUTE: 5.5 K/UL (ref 1.7–7.7)
NEUTROPHILS RELATIVE PERCENT: 76.4 %
PDW BLD-RTO: 14.6 % (ref 12.4–15.4)
PLATELET # BLD: 209 K/UL (ref 135–450)
PMV BLD AUTO: 8.1 FL (ref 5–10.5)
POTASSIUM REFLEX MAGNESIUM: 4.7 MMOL/L (ref 3.5–5.1)
RBC # BLD: 3.31 M/UL (ref 4–5.2)
SODIUM BLD-SCNC: 137 MMOL/L (ref 136–145)
TOTAL PROTEIN: 7.8 G/DL (ref 6.4–8.2)
TROPONIN: <0.01 NG/ML
TROPONIN: <0.01 NG/ML
WBC # BLD: 7.1 K/UL (ref 4–11)

## 2018-10-26 PROCEDURE — 84484 ASSAY OF TROPONIN QUANT: CPT

## 2018-10-26 PROCEDURE — 85025 COMPLETE CBC W/AUTO DIFF WBC: CPT

## 2018-10-26 PROCEDURE — 96374 THER/PROPH/DIAG INJ IV PUSH: CPT

## 2018-10-26 PROCEDURE — 93010 ELECTROCARDIOGRAM REPORT: CPT | Performed by: INTERNAL MEDICINE

## 2018-10-26 PROCEDURE — 6360000002 HC RX W HCPCS: Performed by: EMERGENCY MEDICINE

## 2018-10-26 PROCEDURE — 99285 EMERGENCY DEPT VISIT HI MDM: CPT

## 2018-10-26 PROCEDURE — 96375 TX/PRO/DX INJ NEW DRUG ADDON: CPT

## 2018-10-26 PROCEDURE — 6370000000 HC RX 637 (ALT 250 FOR IP): Performed by: EMERGENCY MEDICINE

## 2018-10-26 PROCEDURE — 80053 COMPREHEN METABOLIC PANEL: CPT

## 2018-10-26 PROCEDURE — 36415 COLL VENOUS BLD VENIPUNCTURE: CPT

## 2018-10-26 PROCEDURE — 93005 ELECTROCARDIOGRAM TRACING: CPT | Performed by: EMERGENCY MEDICINE

## 2018-10-26 PROCEDURE — 71045 X-RAY EXAM CHEST 1 VIEW: CPT

## 2018-10-26 PROCEDURE — 96376 TX/PRO/DX INJ SAME DRUG ADON: CPT

## 2018-10-26 RX ORDER — ONDANSETRON 2 MG/ML
4 INJECTION INTRAMUSCULAR; INTRAVENOUS ONCE
Status: COMPLETED | OUTPATIENT
Start: 2018-10-26 | End: 2018-10-26

## 2018-10-26 RX ORDER — ASPIRIN 81 MG/1
243 TABLET, CHEWABLE ORAL ONCE
Status: COMPLETED | OUTPATIENT
Start: 2018-10-26 | End: 2018-10-26

## 2018-10-26 RX ORDER — MORPHINE SULFATE 10 MG/ML
4 INJECTION, SOLUTION INTRAMUSCULAR; INTRAVENOUS ONCE
Status: COMPLETED | OUTPATIENT
Start: 2018-10-26 | End: 2018-10-26

## 2018-10-26 RX ORDER — OXYCODONE HYDROCHLORIDE AND ACETAMINOPHEN 5; 325 MG/1; MG/1
1 TABLET ORAL ONCE
Status: COMPLETED | OUTPATIENT
Start: 2018-10-26 | End: 2018-10-26

## 2018-10-26 RX ADMIN — ONDANSETRON 4 MG: 2 INJECTION INTRAMUSCULAR; INTRAVENOUS at 08:47

## 2018-10-26 RX ADMIN — LIDOCAINE HYDROCHLORIDE: 20 SOLUTION ORAL; TOPICAL at 09:16

## 2018-10-26 RX ADMIN — MORPHINE SULFATE 4 MG: 10 INJECTION INTRAVENOUS at 10:43

## 2018-10-26 RX ADMIN — MORPHINE SULFATE 4 MG: 10 INJECTION INTRAVENOUS at 08:09

## 2018-10-26 RX ADMIN — OXYCODONE AND ACETAMINOPHEN 1 TABLET: 5; 325 TABLET ORAL at 08:47

## 2018-10-26 RX ADMIN — NITROGLYCERIN 1 INCH: 20 OINTMENT TOPICAL at 08:09

## 2018-10-26 RX ADMIN — ASPIRIN 81 MG CHEWABLE TABLET 243 MG: 81 TABLET CHEWABLE at 08:09

## 2018-10-26 ASSESSMENT — PAIN SCALES - GENERAL
PAINLEVEL_OUTOF10: 7
PAINLEVEL_OUTOF10: 4
PAINLEVEL_OUTOF10: 4
PAINLEVEL_OUTOF10: 9
PAINLEVEL_OUTOF10: 7
PAINLEVEL_OUTOF10: 4
PAINLEVEL_OUTOF10: 8
PAINLEVEL_OUTOF10: 10
PAINLEVEL_OUTOF10: 10
PAINLEVEL_OUTOF10: 9

## 2018-10-26 ASSESSMENT — PAIN DESCRIPTION - DESCRIPTORS: DESCRIPTORS: TIGHTNESS

## 2018-10-26 ASSESSMENT — PAIN DESCRIPTION - FREQUENCY: FREQUENCY: CONTINUOUS

## 2018-10-26 ASSESSMENT — HEART SCORE: ECG: 0

## 2018-10-26 ASSESSMENT — PAIN DESCRIPTION - ONSET: ONSET: GRADUAL

## 2018-10-26 ASSESSMENT — PAIN DESCRIPTION - PROGRESSION: CLINICAL_PROGRESSION: GRADUALLY WORSENING

## 2018-10-26 ASSESSMENT — PAIN DESCRIPTION - PAIN TYPE: TYPE: ACUTE PAIN

## 2018-10-26 ASSESSMENT — PAIN DESCRIPTION - LOCATION: LOCATION: CHEST

## 2018-10-26 ASSESSMENT — PAIN DESCRIPTION - ORIENTATION: ORIENTATION: MID

## 2018-10-26 NOTE — ED PROVIDER NOTES
abnormalityAbnormal ECGWhen compared with ECG of 02-SEP-2018 01:57,No significant change was found   EKG 12 Lead   Result Value Ref Range    Ventricular Rate 71 BPM    Atrial Rate 71 BPM    P-R Interval 112 ms    QRS Duration 90 ms    Q-T Interval 422 ms    QTc Calculation (Bazett) 458 ms    P Axis 4 degrees    R Axis 37 degrees    T Axis 85 degrees    Diagnosis       Normal sinus rhythmNonspecific T wave abnormalityAbnormal ECGWhen compared with ECG of 26-OCT-2018 07:27, (unconfirmed)No significant change was found        Radiographs (if obtained):  [] The following radiograph was interpreted by myself in the absence of a radiologist:  [x] Radiologist's Report Reviewed:  Xr Chest 1 Vw    Result Date: 10/26/2018  EXAMINATION: SINGLE XRAY VIEW OF THE CHEST 10/26/2018 7:46 am COMPARISON: 09/01/2018, 2134 hours HISTORY: ORDERING SYSTEM PROVIDED HISTORY: chest pain TECHNOLOGIST PROVIDED HISTORY: Reason for exam:->chest pain Ordering Physician Provided Reason for Exam: lt sided chest pain Acuity: Acute Type of Exam: Initial Additional signs and symptoms: sob Relevant Medical/Surgical History: copd 80-year-old female with left-sided chest pain FINDINGS: Cardiac monitor leads overlie the chest. Cardiac and mediastinal contours are unchanged and within normal limits. No pneumothorax. No free air. No acute focal airspace consolidation or pleural effusions. Stable mild elevation of the right hemidiaphragm. Prior cholecystectomy. Visualized osseous structures remain unchanged. No acute focal airspace consolidation. EKG (if obtained): (All EKG's are interpreted by myself in the absence of a cardiologist)  Initial EKG on my interpretation shows sinus rhythm with rate of 84 bpm.  QRS axis. No significant ST elevation. Nonspecific T wave changes present. Compared to EKG from September 2, 2018 lateral T-wave inversion is less pronounced.     Repeat EKG at 10:00 on my read shows normal sinus rhythm with rate of 70

## 2018-10-30 ENCOUNTER — HOSPITAL ENCOUNTER (INPATIENT)
Age: 63
LOS: 1 days | Discharge: HOME | DRG: 313 | End: 2018-11-01
Attending: EMERGENCY MEDICINE | Admitting: INTERNAL MEDICINE
Payer: COMMERCIAL

## 2018-10-30 ENCOUNTER — APPOINTMENT (OUTPATIENT)
Dept: GENERAL RADIOLOGY | Age: 63
DRG: 313 | End: 2018-10-30
Payer: COMMERCIAL

## 2018-10-30 DIAGNOSIS — R07.9 CHEST PAIN, UNSPECIFIED TYPE: Primary | ICD-10-CM

## 2018-10-30 LAB
A/G RATIO: 1.1 (ref 1.1–2.2)
ALBUMIN SERPL-MCNC: 3.7 G/DL (ref 3.4–5)
ALP BLD-CCNC: 110 U/L (ref 40–129)
ALT SERPL-CCNC: 45 U/L (ref 10–40)
ANION GAP SERPL CALCULATED.3IONS-SCNC: 14 MMOL/L (ref 3–16)
AST SERPL-CCNC: 83 U/L (ref 15–37)
BASOPHILS ABSOLUTE: 0 K/UL (ref 0–0.2)
BASOPHILS RELATIVE PERCENT: 0.9 %
BILIRUB SERPL-MCNC: 0.3 MG/DL (ref 0–1)
BUN BLDV-MCNC: 24 MG/DL (ref 7–20)
CALCIUM SERPL-MCNC: 8.9 MG/DL (ref 8.3–10.6)
CHLORIDE BLD-SCNC: 106 MMOL/L (ref 99–110)
CO2: 19 MMOL/L (ref 21–32)
CREAT SERPL-MCNC: 1.2 MG/DL (ref 0.6–1.2)
EOSINOPHILS ABSOLUTE: 0.1 K/UL (ref 0–0.6)
EOSINOPHILS RELATIVE PERCENT: 1.3 %
GFR AFRICAN AMERICAN: 55
GFR NON-AFRICAN AMERICAN: 46
GLOBULIN: 3.5 G/DL
GLUCOSE BLD-MCNC: 179 MG/DL (ref 70–99)
HCT VFR BLD CALC: 31.7 % (ref 36–48)
HEMOGLOBIN: 10.3 G/DL (ref 12–16)
LYMPHOCYTES ABSOLUTE: 1.6 K/UL (ref 1–5.1)
LYMPHOCYTES RELATIVE PERCENT: 30.2 %
MCH RBC QN AUTO: 32.5 PG (ref 26–34)
MCHC RBC AUTO-ENTMCNC: 32.5 G/DL (ref 31–36)
MCV RBC AUTO: 99.7 FL (ref 80–100)
MONOCYTES ABSOLUTE: 0.3 K/UL (ref 0–1.3)
MONOCYTES RELATIVE PERCENT: 5 %
NEUTROPHILS ABSOLUTE: 3.3 K/UL (ref 1.7–7.7)
NEUTROPHILS RELATIVE PERCENT: 62.6 %
PDW BLD-RTO: 15 % (ref 12.4–15.4)
PLATELET # BLD: 171 K/UL (ref 135–450)
PMV BLD AUTO: 8.7 FL (ref 5–10.5)
POTASSIUM SERPL-SCNC: 4.5 MMOL/L (ref 3.5–5.1)
RBC # BLD: 3.18 M/UL (ref 4–5.2)
SODIUM BLD-SCNC: 139 MMOL/L (ref 136–145)
TOTAL PROTEIN: 7.2 G/DL (ref 6.4–8.2)
TROPONIN: <0.01 NG/ML
WBC # BLD: 5.2 K/UL (ref 4–11)

## 2018-10-30 PROCEDURE — 85025 COMPLETE CBC W/AUTO DIFF WBC: CPT

## 2018-10-30 PROCEDURE — 6370000000 HC RX 637 (ALT 250 FOR IP): Performed by: NURSE PRACTITIONER

## 2018-10-30 PROCEDURE — 99285 EMERGENCY DEPT VISIT HI MDM: CPT

## 2018-10-30 PROCEDURE — 71046 X-RAY EXAM CHEST 2 VIEWS: CPT

## 2018-10-30 PROCEDURE — 93005 ELECTROCARDIOGRAM TRACING: CPT | Performed by: NURSE PRACTITIONER

## 2018-10-30 PROCEDURE — 84484 ASSAY OF TROPONIN QUANT: CPT

## 2018-10-30 PROCEDURE — 96374 THER/PROPH/DIAG INJ IV PUSH: CPT

## 2018-10-30 PROCEDURE — 80053 COMPREHEN METABOLIC PANEL: CPT

## 2018-10-30 PROCEDURE — 6360000002 HC RX W HCPCS: Performed by: NURSE PRACTITIONER

## 2018-10-30 RX ORDER — NITROGLYCERIN 0.4 MG/1
0.4 TABLET SUBLINGUAL EVERY 5 MIN PRN
Status: DISCONTINUED | OUTPATIENT
Start: 2018-10-30 | End: 2018-10-31

## 2018-10-30 RX ORDER — ONDANSETRON 2 MG/ML
4 INJECTION INTRAMUSCULAR; INTRAVENOUS ONCE
Status: COMPLETED | OUTPATIENT
Start: 2018-10-30 | End: 2018-10-30

## 2018-10-30 RX ADMIN — NITROGLYCERIN 0.4 MG: 0.4 TABLET, ORALLY DISINTEGRATING SUBLINGUAL at 23:10

## 2018-10-30 RX ADMIN — NITROGLYCERIN 0.4 MG: 0.4 TABLET, ORALLY DISINTEGRATING SUBLINGUAL at 23:17

## 2018-10-30 RX ADMIN — NITROGLYCERIN 0.4 MG: 0.4 TABLET, ORALLY DISINTEGRATING SUBLINGUAL at 23:30

## 2018-10-30 RX ADMIN — ONDANSETRON 4 MG: 2 INJECTION INTRAMUSCULAR; INTRAVENOUS at 23:17

## 2018-10-30 ASSESSMENT — PAIN DESCRIPTION - FREQUENCY: FREQUENCY: CONTINUOUS

## 2018-10-30 ASSESSMENT — PAIN SCALES - GENERAL: PAINLEVEL_OUTOF10: 10

## 2018-10-30 ASSESSMENT — PAIN DESCRIPTION - PAIN TYPE: TYPE: ACUTE PAIN

## 2018-10-30 ASSESSMENT — PAIN DESCRIPTION - LOCATION: LOCATION: CHEST

## 2018-10-30 ASSESSMENT — PAIN DESCRIPTION - ORIENTATION: ORIENTATION: LEFT

## 2018-10-31 LAB
ANION GAP SERPL CALCULATED.3IONS-SCNC: 15 MMOL/L (ref 3–16)
BUN BLDV-MCNC: 22 MG/DL (ref 7–20)
CALCIUM SERPL-MCNC: 9 MG/DL (ref 8.3–10.6)
CHLORIDE BLD-SCNC: 105 MMOL/L (ref 99–110)
CO2: 19 MMOL/L (ref 21–32)
CREAT SERPL-MCNC: 1.3 MG/DL (ref 0.6–1.2)
EKG ATRIAL RATE: 69 BPM
EKG ATRIAL RATE: 75 BPM
EKG ATRIAL RATE: 80 BPM
EKG DIAGNOSIS: NORMAL
EKG P AXIS: -8 DEGREES
EKG P AXIS: 2 DEGREES
EKG P AXIS: 6 DEGREES
EKG P-R INTERVAL: 114 MS
EKG P-R INTERVAL: 120 MS
EKG P-R INTERVAL: 130 MS
EKG Q-T INTERVAL: 358 MS
EKG Q-T INTERVAL: 366 MS
EKG Q-T INTERVAL: 394 MS
EKG QRS DURATION: 86 MS
EKG QRS DURATION: 88 MS
EKG QRS DURATION: 90 MS
EKG QTC CALCULATION (BAZETT): 397 MS
EKG QTC CALCULATION (BAZETT): 422 MS
EKG QTC CALCULATION (BAZETT): 422 MS
EKG R AXIS: 34 DEGREES
EKG R AXIS: 48 DEGREES
EKG R AXIS: 50 DEGREES
EKG T AXIS: 52 DEGREES
EKG T AXIS: 52 DEGREES
EKG T AXIS: 76 DEGREES
EKG VENTRICULAR RATE: 69 BPM
EKG VENTRICULAR RATE: 74 BPM
EKG VENTRICULAR RATE: 80 BPM
GFR AFRICAN AMERICAN: 50
GFR NON-AFRICAN AMERICAN: 42
GLUCOSE BLD-MCNC: 154 MG/DL (ref 70–99)
GLUCOSE BLD-MCNC: 159 MG/DL (ref 70–99)
GLUCOSE BLD-MCNC: 207 MG/DL (ref 70–99)
GLUCOSE BLD-MCNC: 240 MG/DL (ref 70–99)
GLUCOSE BLD-MCNC: 86 MG/DL (ref 70–99)
HCT VFR BLD CALC: 33.3 % (ref 36–48)
HEMOGLOBIN: 10.9 G/DL (ref 12–16)
MCH RBC QN AUTO: 32.3 PG (ref 26–34)
MCHC RBC AUTO-ENTMCNC: 32.7 G/DL (ref 31–36)
MCV RBC AUTO: 98.7 FL (ref 80–100)
PDW BLD-RTO: 14.6 % (ref 12.4–15.4)
PERFORMED ON: ABNORMAL
PERFORMED ON: NORMAL
PLATELET # BLD: 170 K/UL (ref 135–450)
PMV BLD AUTO: 7.9 FL (ref 5–10.5)
POTASSIUM REFLEX MAGNESIUM: 4 MMOL/L (ref 3.5–5.1)
PRO-BNP: 375 PG/ML (ref 0–124)
RBC # BLD: 3.37 M/UL (ref 4–5.2)
SODIUM BLD-SCNC: 139 MMOL/L (ref 136–145)
TROPONIN: <0.01 NG/ML
WBC # BLD: 5 K/UL (ref 4–11)

## 2018-10-31 PROCEDURE — 94640 AIRWAY INHALATION TREATMENT: CPT

## 2018-10-31 PROCEDURE — 93010 ELECTROCARDIOGRAM REPORT: CPT | Performed by: INTERNAL MEDICINE

## 2018-10-31 PROCEDURE — 96372 THER/PROPH/DIAG INJ SC/IM: CPT

## 2018-10-31 PROCEDURE — 85027 COMPLETE CBC AUTOMATED: CPT

## 2018-10-31 PROCEDURE — 93005 ELECTROCARDIOGRAM TRACING: CPT | Performed by: INTERNAL MEDICINE

## 2018-10-31 PROCEDURE — 6370000000 HC RX 637 (ALT 250 FOR IP): Performed by: INTERNAL MEDICINE

## 2018-10-31 PROCEDURE — 36415 COLL VENOUS BLD VENIPUNCTURE: CPT

## 2018-10-31 PROCEDURE — 2580000003 HC RX 258: Performed by: INTERNAL MEDICINE

## 2018-10-31 PROCEDURE — 99222 1ST HOSP IP/OBS MODERATE 55: CPT | Performed by: INTERNAL MEDICINE

## 2018-10-31 PROCEDURE — 93005 ELECTROCARDIOGRAM TRACING: CPT | Performed by: NURSE PRACTITIONER

## 2018-10-31 PROCEDURE — 6360000002 HC RX W HCPCS: Performed by: INTERNAL MEDICINE

## 2018-10-31 PROCEDURE — 80048 BASIC METABOLIC PNL TOTAL CA: CPT

## 2018-10-31 PROCEDURE — 84484 ASSAY OF TROPONIN QUANT: CPT

## 2018-10-31 PROCEDURE — 83880 ASSAY OF NATRIURETIC PEPTIDE: CPT

## 2018-10-31 PROCEDURE — 6370000000 HC RX 637 (ALT 250 FOR IP): Performed by: EMERGENCY MEDICINE

## 2018-10-31 PROCEDURE — 2060000000 HC ICU INTERMEDIATE R&B

## 2018-10-31 PROCEDURE — 6370000000 HC RX 637 (ALT 250 FOR IP): Performed by: NURSE PRACTITIONER

## 2018-10-31 PROCEDURE — G0378 HOSPITAL OBSERVATION PER HR: HCPCS

## 2018-10-31 RX ORDER — SODIUM CHLORIDE 0.9 % (FLUSH) 0.9 %
10 SYRINGE (ML) INJECTION PRN
Status: DISCONTINUED | OUTPATIENT
Start: 2018-10-31 | End: 2018-11-01 | Stop reason: HOSPADM

## 2018-10-31 RX ORDER — DULOXETIN HYDROCHLORIDE 60 MG/1
60 CAPSULE, DELAYED RELEASE ORAL DAILY
Status: DISCONTINUED | OUTPATIENT
Start: 2018-10-31 | End: 2018-11-01 | Stop reason: HOSPADM

## 2018-10-31 RX ORDER — ISOSORBIDE MONONITRATE 30 MG/1
30 TABLET, EXTENDED RELEASE ORAL DAILY
Status: DISCONTINUED | OUTPATIENT
Start: 2018-10-31 | End: 2018-11-01 | Stop reason: HOSPADM

## 2018-10-31 RX ORDER — ALBUTEROL SULFATE 90 UG/1
2 AEROSOL, METERED RESPIRATORY (INHALATION) EVERY 6 HOURS PRN
Status: DISCONTINUED | OUTPATIENT
Start: 2018-10-31 | End: 2018-11-01 | Stop reason: HOSPADM

## 2018-10-31 RX ORDER — FAMOTIDINE 20 MG/1
20 TABLET, FILM COATED ORAL 2 TIMES DAILY
Status: DISCONTINUED | OUTPATIENT
Start: 2018-10-31 | End: 2018-11-01 | Stop reason: HOSPADM

## 2018-10-31 RX ORDER — ATORVASTATIN CALCIUM 80 MG/1
80 TABLET, FILM COATED ORAL DAILY
Status: DISCONTINUED | OUTPATIENT
Start: 2018-10-31 | End: 2018-11-01 | Stop reason: HOSPADM

## 2018-10-31 RX ORDER — QUETIAPINE FUMARATE 25 MG/1
50 TABLET, FILM COATED ORAL NIGHTLY
Status: DISCONTINUED | OUTPATIENT
Start: 2018-10-31 | End: 2018-11-01 | Stop reason: HOSPADM

## 2018-10-31 RX ORDER — POTASSIUM CHLORIDE 7.45 MG/ML
10 INJECTION INTRAVENOUS PRN
Status: DISCONTINUED | OUTPATIENT
Start: 2018-10-31 | End: 2018-11-01 | Stop reason: HOSPADM

## 2018-10-31 RX ORDER — ONDANSETRON 2 MG/ML
4 INJECTION INTRAMUSCULAR; INTRAVENOUS EVERY 6 HOURS PRN
Status: DISCONTINUED | OUTPATIENT
Start: 2018-10-31 | End: 2018-11-01 | Stop reason: HOSPADM

## 2018-10-31 RX ORDER — DIVALPROEX SODIUM 500 MG/1
500 TABLET, EXTENDED RELEASE ORAL 2 TIMES DAILY
Status: DISCONTINUED | OUTPATIENT
Start: 2018-10-31 | End: 2018-11-01 | Stop reason: HOSPADM

## 2018-10-31 RX ORDER — CLOPIDOGREL BISULFATE 75 MG/1
75 TABLET ORAL DAILY
Status: DISCONTINUED | OUTPATIENT
Start: 2018-10-31 | End: 2018-11-01 | Stop reason: HOSPADM

## 2018-10-31 RX ORDER — DEXTROSE MONOHYDRATE 50 MG/ML
100 INJECTION, SOLUTION INTRAVENOUS PRN
Status: DISCONTINUED | OUTPATIENT
Start: 2018-10-31 | End: 2018-11-01 | Stop reason: HOSPADM

## 2018-10-31 RX ORDER — DEXTROSE MONOHYDRATE 25 G/50ML
12.5 INJECTION, SOLUTION INTRAVENOUS PRN
Status: DISCONTINUED | OUTPATIENT
Start: 2018-10-31 | End: 2018-11-01 | Stop reason: HOSPADM

## 2018-10-31 RX ORDER — ASPIRIN 81 MG/1
81 TABLET ORAL DAILY
Status: DISCONTINUED | OUTPATIENT
Start: 2018-10-31 | End: 2018-11-01 | Stop reason: HOSPADM

## 2018-10-31 RX ORDER — ASPIRIN 81 MG/1
243 TABLET, CHEWABLE ORAL ONCE
Status: COMPLETED | OUTPATIENT
Start: 2018-10-31 | End: 2018-10-31

## 2018-10-31 RX ORDER — BUPRENORPHINE 5 UG/H
1 PATCH TRANSDERMAL WEEKLY
Status: DISCONTINUED | OUTPATIENT
Start: 2018-11-01 | End: 2018-11-01 | Stop reason: HOSPADM

## 2018-10-31 RX ORDER — RANOLAZINE 500 MG/1
1000 TABLET, EXTENDED RELEASE ORAL 2 TIMES DAILY
Status: DISCONTINUED | OUTPATIENT
Start: 2018-10-31 | End: 2018-11-01 | Stop reason: HOSPADM

## 2018-10-31 RX ORDER — SODIUM CHLORIDE 0.9 % (FLUSH) 0.9 %
10 SYRINGE (ML) INJECTION EVERY 12 HOURS SCHEDULED
Status: DISCONTINUED | OUTPATIENT
Start: 2018-10-31 | End: 2018-11-01 | Stop reason: HOSPADM

## 2018-10-31 RX ORDER — NITROGLYCERIN 0.4 MG/1
0.4 TABLET SUBLINGUAL EVERY 5 MIN PRN
Status: DISCONTINUED | OUTPATIENT
Start: 2018-10-31 | End: 2018-11-01 | Stop reason: HOSPADM

## 2018-10-31 RX ORDER — TORSEMIDE 20 MG/1
20 TABLET ORAL DAILY
Status: DISCONTINUED | OUTPATIENT
Start: 2018-10-31 | End: 2018-11-01 | Stop reason: HOSPADM

## 2018-10-31 RX ORDER — ACETAMINOPHEN 325 MG/1
650 TABLET ORAL EVERY 6 HOURS PRN
Status: DISCONTINUED | OUTPATIENT
Start: 2018-10-31 | End: 2018-11-01 | Stop reason: HOSPADM

## 2018-10-31 RX ORDER — CHLORTHALIDONE 25 MG/1
25 TABLET ORAL DAILY
Status: DISCONTINUED | OUTPATIENT
Start: 2018-10-31 | End: 2018-11-01 | Stop reason: HOSPADM

## 2018-10-31 RX ORDER — HYDRALAZINE HYDROCHLORIDE 50 MG/1
50 TABLET, FILM COATED ORAL 2 TIMES DAILY
Status: DISCONTINUED | OUTPATIENT
Start: 2018-10-31 | End: 2018-11-01 | Stop reason: HOSPADM

## 2018-10-31 RX ORDER — POTASSIUM CHLORIDE 1.5 G/1.77G
40 POWDER, FOR SOLUTION ORAL PRN
Status: DISCONTINUED | OUTPATIENT
Start: 2018-10-31 | End: 2018-11-01 | Stop reason: HOSPADM

## 2018-10-31 RX ORDER — POTASSIUM CHLORIDE 20 MEQ/1
40 TABLET, EXTENDED RELEASE ORAL PRN
Status: DISCONTINUED | OUTPATIENT
Start: 2018-10-31 | End: 2018-11-01 | Stop reason: HOSPADM

## 2018-10-31 RX ORDER — NICOTINE POLACRILEX 4 MG
15 LOZENGE BUCCAL PRN
Status: DISCONTINUED | OUTPATIENT
Start: 2018-10-31 | End: 2018-11-01 | Stop reason: HOSPADM

## 2018-10-31 RX ORDER — TOPIRAMATE 100 MG/1
100 TABLET, FILM COATED ORAL 2 TIMES DAILY
Status: DISCONTINUED | OUTPATIENT
Start: 2018-10-31 | End: 2018-11-01 | Stop reason: HOSPADM

## 2018-10-31 RX ORDER — METOPROLOL SUCCINATE 25 MG/1
12.5 TABLET, EXTENDED RELEASE ORAL 2 TIMES DAILY
Status: DISCONTINUED | OUTPATIENT
Start: 2018-10-31 | End: 2018-11-01 | Stop reason: HOSPADM

## 2018-10-31 RX ORDER — AMLODIPINE BESYLATE 5 MG/1
5 TABLET ORAL DAILY
Status: DISCONTINUED | OUTPATIENT
Start: 2018-10-31 | End: 2018-11-01 | Stop reason: HOSPADM

## 2018-10-31 RX ORDER — BUPRENORPHINE 5 UG/H
1 PATCH TRANSDERMAL WEEKLY
Status: DISCONTINUED | OUTPATIENT
Start: 2018-10-31 | End: 2018-10-31

## 2018-10-31 RX ORDER — BUPROPION HYDROCHLORIDE 150 MG/1
150 TABLET ORAL EVERY MORNING
Status: DISCONTINUED | OUTPATIENT
Start: 2018-10-31 | End: 2018-11-01 | Stop reason: HOSPADM

## 2018-10-31 RX ADMIN — INSULIN LISPRO 2 UNITS: 100 INJECTION, SOLUTION INTRAVENOUS; SUBCUTANEOUS at 09:41

## 2018-10-31 RX ADMIN — RANOLAZINE 1000 MG: 500 TABLET, FILM COATED, EXTENDED RELEASE ORAL at 09:06

## 2018-10-31 RX ADMIN — MOMETASONE FUROATE AND FORMOTEROL FUMARATE DIHYDRATE 2 PUFF: 200; 5 AEROSOL RESPIRATORY (INHALATION) at 19:30

## 2018-10-31 RX ADMIN — HYDRALAZINE HYDROCHLORIDE 50 MG: 50 TABLET, FILM COATED ORAL at 20:05

## 2018-10-31 RX ADMIN — HYDRALAZINE HYDROCHLORIDE 50 MG: 50 TABLET, FILM COATED ORAL at 09:06

## 2018-10-31 RX ADMIN — ASPIRIN 81 MG: 81 TABLET, COATED ORAL at 09:06

## 2018-10-31 RX ADMIN — Medication 10 ML: at 20:06

## 2018-10-31 RX ADMIN — TORSEMIDE 20 MG: 20 TABLET ORAL at 09:07

## 2018-10-31 RX ADMIN — NITROGLYCERIN 0.5 INCH: 20 OINTMENT TOPICAL at 01:22

## 2018-10-31 RX ADMIN — INSULIN LISPRO 4 UNITS: 100 INJECTION, SOLUTION INTRAVENOUS; SUBCUTANEOUS at 12:33

## 2018-10-31 RX ADMIN — FAMOTIDINE 20 MG: 20 TABLET ORAL at 20:05

## 2018-10-31 RX ADMIN — ASPIRIN 81 MG CHEWABLE TABLET 243 MG: 81 TABLET CHEWABLE at 01:22

## 2018-10-31 RX ADMIN — ISOSORBIDE MONONITRATE 30 MG: 30 TABLET, EXTENDED RELEASE ORAL at 09:07

## 2018-10-31 RX ADMIN — AMLODIPINE BESYLATE 5 MG: 5 TABLET ORAL at 09:06

## 2018-10-31 RX ADMIN — CLOPIDOGREL BISULFATE 75 MG: 75 TABLET ORAL at 09:07

## 2018-10-31 RX ADMIN — NITROGLYCERIN 0.4 MG: 0.4 TABLET, ORALLY DISINTEGRATING SUBLINGUAL at 08:29

## 2018-10-31 RX ADMIN — CHLORTHALIDONE 25 MG: 25 TABLET ORAL at 09:06

## 2018-10-31 RX ADMIN — DIVALPROEX SODIUM 500 MG: 500 TABLET, EXTENDED RELEASE ORAL at 09:59

## 2018-10-31 RX ADMIN — ATORVASTATIN CALCIUM 80 MG: 80 TABLET, FILM COATED ORAL at 09:07

## 2018-10-31 RX ADMIN — ACETAMINOPHEN 650 MG: 325 TABLET, FILM COATED ORAL at 06:45

## 2018-10-31 RX ADMIN — FAMOTIDINE 20 MG: 20 TABLET ORAL at 09:06

## 2018-10-31 RX ADMIN — Medication 10 ML: at 09:07

## 2018-10-31 RX ADMIN — QUETIAPINE FUMARATE 50 MG: 25 TABLET ORAL at 20:05

## 2018-10-31 RX ADMIN — DIVALPROEX SODIUM 500 MG: 500 TABLET, EXTENDED RELEASE ORAL at 20:05

## 2018-10-31 RX ADMIN — INSULIN LISPRO 2 UNITS: 100 INJECTION, SOLUTION INTRAVENOUS; SUBCUTANEOUS at 20:07

## 2018-10-31 RX ADMIN — TOPIRAMATE 100 MG: 100 TABLET, FILM COATED ORAL at 20:05

## 2018-10-31 RX ADMIN — METOPROLOL SUCCINATE 12.5 MG: 25 TABLET, EXTENDED RELEASE ORAL at 09:06

## 2018-10-31 RX ADMIN — MOMETASONE FUROATE AND FORMOTEROL FUMARATE DIHYDRATE 2 PUFF: 200; 5 AEROSOL RESPIRATORY (INHALATION) at 08:17

## 2018-10-31 RX ADMIN — METOPROLOL SUCCINATE 12.5 MG: 25 TABLET, EXTENDED RELEASE ORAL at 20:06

## 2018-10-31 RX ADMIN — ENOXAPARIN SODIUM 40 MG: 40 INJECTION SUBCUTANEOUS at 09:06

## 2018-10-31 RX ADMIN — LIDOCAINE HYDROCHLORIDE: 20 SOLUTION ORAL; TOPICAL at 00:41

## 2018-10-31 RX ADMIN — BUPROPION HYDROCHLORIDE 150 MG: 150 TABLET, FILM COATED, EXTENDED RELEASE ORAL at 09:06

## 2018-10-31 RX ADMIN — TOPIRAMATE 100 MG: 100 TABLET, FILM COATED ORAL at 09:06

## 2018-10-31 RX ADMIN — RANOLAZINE 1000 MG: 500 TABLET, FILM COATED, EXTENDED RELEASE ORAL at 20:05

## 2018-10-31 RX ADMIN — DULOXETINE HYDROCHLORIDE 60 MG: 60 CAPSULE, DELAYED RELEASE ORAL at 09:07

## 2018-10-31 ASSESSMENT — PAIN DESCRIPTION - ORIENTATION
ORIENTATION: LEFT

## 2018-10-31 ASSESSMENT — PAIN SCALES - GENERAL
PAINLEVEL_OUTOF10: 5
PAINLEVEL_OUTOF10: 6
PAINLEVEL_OUTOF10: 0
PAINLEVEL_OUTOF10: 0
PAINLEVEL_OUTOF10: 6
PAINLEVEL_OUTOF10: 2
PAINLEVEL_OUTOF10: 6
PAINLEVEL_OUTOF10: 0

## 2018-10-31 ASSESSMENT — PAIN DESCRIPTION - DESCRIPTORS
DESCRIPTORS: DISCOMFORT
DESCRIPTORS: TIGHTNESS
DESCRIPTORS: TIGHTNESS

## 2018-10-31 ASSESSMENT — PAIN DESCRIPTION - LOCATION
LOCATION: CHEST

## 2018-10-31 ASSESSMENT — PAIN DESCRIPTION - PAIN TYPE
TYPE: ACUTE PAIN

## 2018-10-31 ASSESSMENT — HEART SCORE: ECG: 0

## 2018-10-31 NOTE — ED PROVIDER NOTES
I independently evaluated and obtained a history and physical on Venus Hester. All diagnostic, treatment, and disposition assistants were made to myself in conjunction the advanced practice provider. For further details of this patient's emergency department encounter, please see the advanced practice provider's documentation. History: 63F p/w atraumatic chest pain. L-sided pt says. H/o coronary stent placement in past. No recent travel/surg/immobility. Physician Exam:   Gen-awake, alert, NAD  CV-nl s1 and s2, no m/r/g, equal pulses in both upper ext  Lungs-CTABL  Abd-soft, NTND, no masses/HSM; normal bowel sounds  MSK-no lower ext edema; no palpable cords or ttp in calves    The ECG shows  nsr with a rate of 80  Axis is   Normal  QTc is  within an acceptable range  Intervals and Durations are unremarkable. ST Segments: no STEMI. Later ECG obtained at 01:23 shows  nsr with a rate of 74  Axis is   Normal  QTc is  within an acceptable range  Intervals and Durations are unremarkable. ST Segments: no STEMI.    MDM  See STEPHEN's note for details of plan. Pt reassessed mult times in ED & her chest pain is resolved w/ nitro paste. 0150: d/w Dr Jada Norman, hospitalist, who is admitting pt. Clinical impression: chest pain.          Bubba Steinberg MD  11/02/18 7615
 Irritable bowel syndrome     Keratitis     Neuropathy     Superior vena cava obstruction     Temporal arteritis ([address] 2/24/2014     Past Surgical History:   Procedure Laterality Date    ABLATION OF DYSRHYTHMIC FOCUS      ARTERY BIOPSY Right 04/23/2014    RIGHT TEMPORAL ARTERY BIOPSY    CATARACT REMOVAL Bilateral     CHOLECYSTECTOMY      CORONARY ANGIOPLASTY WITH STENT PLACEMENT      CORONARY ANGIOPLASTY WITH STENT PLACEMENT      HYSTERECTOMY      JOINT REPLACEMENT Right     KNEE ARTHROSCOPY Right     KNEE SURGERY      right knee replacement    TUNNELED VENOUS PORT PLACEMENT      left thigh.  VASCULAR SURGERY         CURRENT MEDICATIONS  (may include discharge medications prescribed in the ED)  Current Outpatient Rx   Medication Sig Dispense Refill    chlorthalidone (HYGROTON) 25 MG tablet TAKE ONE TABLET BY MOUTH DAILY 90 tablet 0    tiZANidine (ZANAFLEX) 4 MG tablet Take 1/2 tablet in the morning and 1 tablet at night 60 tablet 1    divalproex (DEPAKOTE ER) 500 MG extended release tablet take 1 tablet by mouth BID 60 tablet 1    topiramate (TOPAMAX) 100 MG tablet Take 1 tablet by mouth 2 times daily 60 tablet 1    buPROPion (WELLBUTRIN XL) 150 MG extended release tablet Take 1 tablet by mouth every morning 30 tablet 1    DULoxetine (CYMBALTA) 60 MG extended release capsule Take 1 capsule by mouth daily 30 capsule 0    oxyCODONE-acetaminophen (PERCOCET) 7.5-325 MG per tablet Take 1 tablet by mouth every 6 hours as needed for Pain (max 3 per day) for up to 30 days. . 84 tablet 0    buprenorphine (BUTRANS) 5 MCG/HR PTWK Place 1 patch onto the skin once a week for 28 days. . 4 patch 0    QUEtiapine (SEROQUEL) 50 MG tablet Take 1 tablet by mouth nightly 60 tablet 1    ULTICARE MINI PEN NEEDLES 31G X 6 MM MISC USE THREE TIMES A  each 4    NOVOLOG FLEXPEN 100 UNIT/ML injection pen BLOOD SUGAR <150 GIVE 5  UNITS:151-200 7UNITS:    201-250 10 UNITS: [phone] UNITS: 301-350 14UNITS>351

## 2018-10-31 NOTE — H&P
Medications Prior to Admission:    Prior to Admission medications    Medication Sig Start Date End Date Taking? Authorizing Provider   chlorthalidone (HYGROTON) 25 MG tablet TAKE ONE TABLET BY MOUTH DAILY 10/12/18   Evelyn Giron MD   tiZANidine (ZANAFLEX) 4 MG tablet Take 1/2 tablet in the morning and 1 tablet at night 10/9/18   Yosef Schultz MD   divalproex (DEPAKOTE ER) 500 MG extended release tablet take 1 tablet by mouth BID 10/9/18   Yosef Schultz MD   topiramate (TOPAMAX) 100 MG tablet Take 1 tablet by mouth 2 times daily 10/9/18   Yosef Schultz MD   buPROPion (WELLBUTRIN XL) 150 MG extended release tablet Take 1 tablet by mouth every morning 10/9/18   Yosef Schultz MD   DULoxetine (CYMBALTA) 60 MG extended release capsule Take 1 capsule by mouth daily 10/9/18   Yosef Schultz MD   oxyCODONE-acetaminophen (PERCOCET) 7.5-325 MG per tablet Take 1 tablet by mouth every 6 hours as needed for Pain (max 3 per day) for up to 30 days. . 10/9/18 11/8/18  Yosef Schultz MD   buprenorphine (BUTRANS) 5 MCG/HR PTWK Place 1 patch onto the skin once a week for 28 days. . 10/9/18 11/6/18  Yosef Schultz MD   QUEtiapine (SEROQUEL) 50 MG tablet Take 1 tablet by mouth nightly 10/9/18   Yosef Schultz MD   ULTICARE MINI PEN NEEDLES 31G X 6 MM MISC USE THREE TIMES A DAY 9/10/18   Marin Roy MD   NOVOLOG FLEXPEN 100 UNIT/ML injection pen BLOOD SUGAR <150 GIVE 5  UNITS:151-200 7UNITS:    201-250 10 UNITS: [phone] UNITS: 301-350 14UNITS>351 15 UNITS BEFORE MEAL 9/10/18   Josias Sal MD   SUMAtriptan (IMITREX) 100 MG tablet Take 1 tablet by mouth once as needed for Migraine 9/7/18 9/7/18  Josias Sal MD   nitroGLYCERIN (NITROSTAT) 0.3 MG SL tablet Place 1 tablet under the tongue every 5 minutes as needed for Chest pain 9/2/18   [facility]   Blood Pressure KIT HTN  110 8/29/18   Josias Sal MD   Continuous Blood Gluc  (FREESTYLE LISSETT READER) DAYO 1 Device by Does not apply route three

## 2018-10-31 NOTE — PROGRESS NOTES
0730- Handoff completed with YOLETTE Bishop. Pt in bed alert and oriented. VSS on RA,denies any pain or discomfort. Cardiology consult called to MD on call. 0830- 0.4mg nitro sublingual given for c/o cp 6/10.  0835- Pt states her pain is better at 2/10 and tolerable  1115- at bedside updated on pt status. 1600-  No change in reassessment, VSS on RA. Pt denies any pain or sob. Pt has her old pain patch on to [address]. Family will bring her pain patch tomorrow AM.  1930- Handoff completed with Martin Lara.

## 2018-11-01 VITALS
DIASTOLIC BLOOD PRESSURE: 50 MMHG | OXYGEN SATURATION: 100 % | RESPIRATION RATE: 20 BRPM | HEART RATE: 74 BPM | WEIGHT: 124.12 LBS | SYSTOLIC BLOOD PRESSURE: 108 MMHG | BODY MASS INDEX: 24.37 KG/M2 | TEMPERATURE: 98 F | HEIGHT: 60 IN

## 2018-11-01 LAB
GLUCOSE BLD-MCNC: 161 MG/DL (ref 70–99)
GLUCOSE BLD-MCNC: 281 MG/DL (ref 70–99)
PERFORMED ON: ABNORMAL
PERFORMED ON: ABNORMAL

## 2018-11-01 PROCEDURE — 94760 N-INVAS EAR/PLS OXIMETRY 1: CPT

## 2018-11-01 PROCEDURE — 6360000002 HC RX W HCPCS: Performed by: INTERNAL MEDICINE

## 2018-11-01 PROCEDURE — G0378 HOSPITAL OBSERVATION PER HR: HCPCS

## 2018-11-01 PROCEDURE — 6370000000 HC RX 637 (ALT 250 FOR IP): Performed by: INTERNAL MEDICINE

## 2018-11-01 PROCEDURE — 94640 AIRWAY INHALATION TREATMENT: CPT

## 2018-11-01 PROCEDURE — 96372 THER/PROPH/DIAG INJ SC/IM: CPT

## 2018-11-01 PROCEDURE — 2580000003 HC RX 258: Performed by: INTERNAL MEDICINE

## 2018-11-01 RX ORDER — OXYCODONE AND ACETAMINOPHEN 7.5; 325 MG/1; MG/1
1 TABLET ORAL EVERY 4 HOURS PRN
Status: DISCONTINUED | OUTPATIENT
Start: 2018-11-01 | End: 2018-11-01 | Stop reason: HOSPADM

## 2018-11-01 RX ADMIN — CLOPIDOGREL BISULFATE 75 MG: 75 TABLET ORAL at 09:06

## 2018-11-01 RX ADMIN — OXYCODONE HYDROCHLORIDE AND ACETAMINOPHEN 1 TABLET: 7.5; 325 TABLET ORAL at 11:15

## 2018-11-01 RX ADMIN — MOMETASONE FUROATE AND FORMOTEROL FUMARATE DIHYDRATE 2 PUFF: 200; 5 AEROSOL RESPIRATORY (INHALATION) at 08:33

## 2018-11-01 RX ADMIN — ATORVASTATIN CALCIUM 80 MG: 80 TABLET, FILM COATED ORAL at 09:06

## 2018-11-01 RX ADMIN — TOPIRAMATE 100 MG: 100 TABLET, FILM COATED ORAL at 09:06

## 2018-11-01 RX ADMIN — DULOXETINE HYDROCHLORIDE 60 MG: 60 CAPSULE, DELAYED RELEASE ORAL at 09:06

## 2018-11-01 RX ADMIN — HYDRALAZINE HYDROCHLORIDE 50 MG: 50 TABLET, FILM COATED ORAL at 09:06

## 2018-11-01 RX ADMIN — RANOLAZINE 1000 MG: 500 TABLET, FILM COATED, EXTENDED RELEASE ORAL at 09:05

## 2018-11-01 RX ADMIN — INSULIN LISPRO 2 UNITS: 100 INJECTION, SOLUTION INTRAVENOUS; SUBCUTANEOUS at 09:04

## 2018-11-01 RX ADMIN — Medication 10 ML: at 09:07

## 2018-11-01 RX ADMIN — DIVALPROEX SODIUM 500 MG: 500 TABLET, EXTENDED RELEASE ORAL at 09:06

## 2018-11-01 RX ADMIN — AMLODIPINE BESYLATE 5 MG: 5 TABLET ORAL at 09:06

## 2018-11-01 RX ADMIN — ENOXAPARIN SODIUM 40 MG: 40 INJECTION SUBCUTANEOUS at 09:05

## 2018-11-01 RX ADMIN — INSULIN LISPRO 6 UNITS: 100 INJECTION, SOLUTION INTRAVENOUS; SUBCUTANEOUS at 12:06

## 2018-11-01 RX ADMIN — TORSEMIDE 20 MG: 20 TABLET ORAL at 09:06

## 2018-11-01 RX ADMIN — METOPROLOL SUCCINATE 12.5 MG: 25 TABLET, EXTENDED RELEASE ORAL at 09:06

## 2018-11-01 RX ADMIN — ISOSORBIDE MONONITRATE 30 MG: 30 TABLET, EXTENDED RELEASE ORAL at 09:06

## 2018-11-01 RX ADMIN — BUPROPION HYDROCHLORIDE 150 MG: 150 TABLET, FILM COATED, EXTENDED RELEASE ORAL at 09:05

## 2018-11-01 RX ADMIN — FAMOTIDINE 20 MG: 20 TABLET ORAL at 09:06

## 2018-11-01 RX ADMIN — ASPIRIN 81 MG: 81 TABLET, COATED ORAL at 09:06

## 2018-11-01 RX ADMIN — CHLORTHALIDONE 25 MG: 25 TABLET ORAL at 09:05

## 2018-11-01 ASSESSMENT — PAIN SCALES - GENERAL
PAINLEVEL_OUTOF10: 7
PAINLEVEL_OUTOF10: 6
PAINLEVEL_OUTOF10: 7
PAINLEVEL_OUTOF10: 0
PAINLEVEL_OUTOF10: 0
PAINLEVEL_OUTOF10: 6

## 2018-11-01 ASSESSMENT — PAIN DESCRIPTION - DESCRIPTORS: DESCRIPTORS: HEAVINESS

## 2018-11-01 ASSESSMENT — PAIN DESCRIPTION - PROGRESSION: CLINICAL_PROGRESSION: NOT CHANGED

## 2018-11-01 ASSESSMENT — PAIN DESCRIPTION - ORIENTATION: ORIENTATION: MID

## 2018-11-01 ASSESSMENT — PAIN DESCRIPTION - LOCATION: LOCATION: CHEST

## 2018-11-01 ASSESSMENT — PAIN DESCRIPTION - ONSET: ONSET: ON-GOING

## 2018-11-01 ASSESSMENT — PAIN DESCRIPTION - PAIN TYPE: TYPE: ACUTE PAIN

## 2018-11-01 NOTE — DISCHARGE INSTR - COC
Continuity of Care Form    Patient Name: Tawanna Dancer   :  1955  MRN:  [id number]    Admit date:  10/30/2018  Discharge date:  ***    Code Status Order: Full Code   Advance Directives:   Advance Care Flowsheet Documentation     Date/Time Healthcare Directive Type of Healthcare Directive Copy in [address] Agent's Name Healthcare Agent's Phone Number    10/31/18 1121  Yes, patient has an advance directive for healthcare treatment  Living will  No, copy requested from family  --  Etelvina Jorge   [phone]          Admitting Physician:  Jorge Luis Patel MD  PCP: Everett Wisdom MD    Discharging Nurse: [facility] Unit/Room#: M7P-1641/2107-01  Discharging Unit Phone Number: ***    Emergency Contact:   Extended Emergency Contact Information  Primary Emergency Contact: Kevyn Joelciro [address] Phone: [phone]  Relation: Child  Secondary Emergency Contact: Nneka Veloz  Address: [address] Phone: [phone]  Relation: Child    Past Surgical History:  Past Surgical History:   Procedure Laterality Date    ABLATION OF DYSRHYTHMIC FOCUS      ARTERY BIOPSY Right 2014    RIGHT TEMPORAL ARTERY BIOPSY    CATARACT REMOVAL Bilateral     CHOLECYSTECTOMY      CORONARY ANGIOPLASTY WITH STENT PLACEMENT      CORONARY ANGIOPLASTY WITH STENT PLACEMENT      HYSTERECTOMY      JOINT REPLACEMENT Right     KNEE ARTHROSCOPY Right     KNEE SURGERY      right knee replacement    TUNNELED VENOUS PORT PLACEMENT      left thigh.      VASCULAR SURGERY         Immunization History:   Immunization History   Administered Date(s) Administered    Influenza Vaccine, unspecified formulation 2009, 10/08/2010    Influenza Virus Vaccine 2013, 2014    Influenza, Intradermal, Preservative free 10/04/2012    Influenza, Intradermal, Quadrivalent, Preservative Free 2017    Influenza, Jason Desouza, 3 yrs and older, IM, PF (Fluzone 3 yrs and older or Afluria 5 yrs and older) 09/28/2016, 09/14/2018    Pneumococcal Polysaccharide ([id number]) 12/12/2013    Tdap (Boostrix, Adacel) 11/17/2009, 10/28/2016       Active Problems:  Patient Active Problem List   Diagnosis Code    HTN (hypertension) I10    Hyperlipidemia E78.5    CAD (coronary artery disease) I25.10    Palpitation R00.2    PVC (premature ventricular contraction) I49.3    COPD (chronic obstructive pulmonary disease) ([facility]) J44.9    Asthma J45.909    Depression F32.9    Migraine with aura, intractable G43.119    Hemisensory loss R20.0    PTSD (post-traumatic stress disorder) F43.10    Insomnia G47.00    Snoring R06.83    Chest pain R07.9    Dysarthria R47.1    Port-A-Cath in place Z95.828    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7    Chronic pain syndrome G89.4    Headache, chronic migraine without aura, intractable, with status G43.711    Type 2 diabetes mellitus with diabetic chronic kidney disease (formerly [facility]) E11.22    S/P right coronary artery (RCA) stent placement Z95.5    Irritable bowel syndrome K58.9    Giant cell arteritis ([facility]) M31.6    Gastro-esophageal reflux disease without esophagitis K21.9    A-fib (formerly [facility]) I48.91    Abscess of groin, right L02.214    Precordial chest pain R07.2    Coronary artery disease involving native coronary artery of native heart with angina pectoris (formerly [facility]) I25.119    COPD exacerbation ([facility]) J44.1    DM (diabetes mellitus), type 2, uncontrolled ([facility] E11.65    Right knee pain M25.561    Chronic painful diabetic neuropathy (HCC) E11.40    CAD in native artery I25.10       Isolation/Infection:   Isolation          No Isolation            Nurse Assessment:  Last Vital Signs: BP (!) 108/50   Pulse 74   Temp 98 °F (36.7 °C) (Axillary)   Resp 20   Ht 5' (1.524 m)   Wt 124 lb 1.9 oz (56.3 kg)   SpO2 100%   BMI 24.24 kg/m²     Last documented pain score (0-10 scale): Pain Level: 7  Last Weight:   Wt Readings from Last 1 Encounters:   11/01/18 124 lb 1.9 oz (56.3 kg)     Mental Status:  {IP PT MENTAL STATUS:20030}    IV Access:  { RONNIE IV ACCESS:354990848}    Nursing Mobility/ADLs:  Walking   {P DME NPOX:037416149}  Transfer  {P DME DIMJ:745081145}  Bathing  {P DME YNBA:906321965}  Dressing  {P DME VWHI:855812567}  Toileting  {P DME FMRE:525532743}  Feeding  {P DME EBBR:812757435}  Med Admin  {P DME ZJJN:359871806}  Med Delivery   { RONNIE MED Delivery:173035569}    Wound Care Documentation and Therapy:        Elimination:  Continence:   · Bowel: {YES / PS:87387}  · Bladder: {YES / NU:83816}  Urinary Catheter: {Urinary Catheter:212251347}   Colostomy/Ileostomy/Ileal Conduit: {YES / RR:81012}       Date of Last BM: ***    Intake/Output Summary (Last 24 hours) at 11/01/18 1317  Last data filed at 11/01/18 0921   Gross per 24 hour   Intake             1350 ml   Output              900 ml   Net              450 ml     I/O last 3 completed shifts:   In: 18 [P.O.:1560]  Out: 1500 [Urine:1500]    Safety Concerns:     [address] Safety Concerns:858948602}    Impairments/Disabilities:      [address] Impairments/Disabilities:961123508}    Nutrition Therapy:  Current Nutrition Therapy:   [address] Diet List:452420933}    Routes of Feeding: {CHP DME Other Feedings:208610552}  Liquids: {Slp liquid thickness:98057}  Daily Fluid Restriction: {CHP DME Yes amt example:117025103}  Last Modified Barium Swallow with Video (Video

## 2018-11-02 NOTE — DISCHARGE SUMMARY
Disp-1 Inhaler, R-5Normal      ULTICARE MINI PEN NEEDLES 31G X 6 MM MISC Disp-100 each, R-4, Normal      Blood Pressure KIT Disp-1 kit, R-0, NormalHTN  110      Continuous Blood Gluc  (FREESTYLE LISSETT READER) DAYO 1 Device by Does not apply route three times daily, Disp-1 Device, R-0Normal      Continuous Blood Gluc Sensor (FREESTYLE LISSETT SENSOR SYSTEM) MISC 1 Device by Does not apply route three times daily 250.00 E 11.9, Disp-1 each, R-0Normal             Time Spent on discharge is more than 30 minutes in the examination, evaluation, counseling and review of medications and discharge plan. Signed:    Verna Arredondo MD   11/2/2018      Thank you Madai Peña MD for the opportunity to be involved in this patient's care. If you have any questions or concerns please feel free to contact me at [phone].

## 2018-11-06 ENCOUNTER — OFFICE VISIT (OUTPATIENT)
Dept: PAIN MANAGEMENT | Age: 63
End: 2018-11-06
Payer: COMMERCIAL

## 2018-11-06 VITALS
BODY MASS INDEX: 24.41 KG/M2 | SYSTOLIC BLOOD PRESSURE: 139 MMHG | HEART RATE: 73 BPM | DIASTOLIC BLOOD PRESSURE: 85 MMHG | WEIGHT: 125 LBS

## 2018-11-06 DIAGNOSIS — G89.4 CHRONIC PAIN SYNDROME: ICD-10-CM

## 2018-11-06 DIAGNOSIS — M79.7 FIBROMYALGIA: ICD-10-CM

## 2018-11-06 DIAGNOSIS — G43.119 INTRACTABLE MIGRAINE WITH AURA WITHOUT STATUS MIGRAINOSUS: ICD-10-CM

## 2018-11-06 DIAGNOSIS — E11.40 CHRONIC PAINFUL DIABETIC NEUROPATHY (HCC): ICD-10-CM

## 2018-11-06 PROCEDURE — 1111F DSCHRG MED/CURRENT MED MERGE: CPT | Performed by: INTERNAL MEDICINE

## 2018-11-06 PROCEDURE — G8482 FLU IMMUNIZE ORDER/ADMIN: HCPCS | Performed by: INTERNAL MEDICINE

## 2018-11-06 PROCEDURE — G8598 ASA/ANTIPLAT THER USED: HCPCS | Performed by: INTERNAL MEDICINE

## 2018-11-06 PROCEDURE — 3046F HEMOGLOBIN A1C LEVEL >9.0%: CPT | Performed by: INTERNAL MEDICINE

## 2018-11-06 PROCEDURE — G8427 DOCREV CUR MEDS BY ELIG CLIN: HCPCS | Performed by: INTERNAL MEDICINE

## 2018-11-06 PROCEDURE — 1036F TOBACCO NON-USER: CPT | Performed by: INTERNAL MEDICINE

## 2018-11-06 PROCEDURE — G8420 CALC BMI NORM PARAMETERS: HCPCS | Performed by: INTERNAL MEDICINE

## 2018-11-06 PROCEDURE — 2022F DILAT RTA XM EVC RTNOPTHY: CPT | Performed by: INTERNAL MEDICINE

## 2018-11-06 PROCEDURE — 3017F COLORECTAL CA SCREEN DOC REV: CPT | Performed by: INTERNAL MEDICINE

## 2018-11-06 PROCEDURE — 99213 OFFICE O/P EST LOW 20 MIN: CPT | Performed by: INTERNAL MEDICINE

## 2018-11-06 RX ORDER — QUETIAPINE FUMARATE 50 MG/1
50 TABLET, FILM COATED ORAL NIGHTLY
Qty: 60 TABLET | Refills: 1 | Status: SHIPPED | OUTPATIENT
Start: 2018-11-06 | End: 2018-12-04 | Stop reason: SDUPTHER

## 2018-11-06 RX ORDER — DULOXETIN HYDROCHLORIDE 60 MG/1
60 CAPSULE, DELAYED RELEASE ORAL DAILY
Qty: 30 CAPSULE | Refills: 0 | Status: SHIPPED | OUTPATIENT
Start: 2018-11-06 | End: 2018-12-04 | Stop reason: SDUPTHER

## 2018-11-06 RX ORDER — TIZANIDINE 4 MG/1
TABLET ORAL
Qty: 60 TABLET | Refills: 1 | Status: SHIPPED | OUTPATIENT
Start: 2018-11-06 | End: 2018-11-07

## 2018-11-06 RX ORDER — TOPIRAMATE 100 MG/1
100 TABLET, FILM COATED ORAL 2 TIMES DAILY
Qty: 60 TABLET | Refills: 1 | Status: SHIPPED | OUTPATIENT
Start: 2018-11-06 | End: 2018-12-04 | Stop reason: SDUPTHER

## 2018-11-06 RX ORDER — OXYCODONE AND ACETAMINOPHEN 7.5; 325 MG/1; MG/1
1 TABLET ORAL EVERY 6 HOURS PRN
Qty: 84 TABLET | Refills: 0 | Status: SHIPPED | OUTPATIENT
Start: 2018-11-06 | End: 2018-12-04 | Stop reason: SDUPTHER

## 2018-11-06 RX ORDER — BUPRENORPHINE 5 UG/H
1 PATCH TRANSDERMAL WEEKLY
Qty: 4 PATCH | Refills: 0 | Status: SHIPPED | OUTPATIENT
Start: 2018-11-06 | End: 2018-12-04 | Stop reason: SDUPTHER

## 2018-11-06 RX ORDER — DIVALPROEX SODIUM 250 MG/1
250 TABLET, DELAYED RELEASE ORAL 2 TIMES DAILY
Qty: 60 TABLET | Refills: 3 | Status: SHIPPED | OUTPATIENT
Start: 2018-11-06 | End: 2018-12-04 | Stop reason: SDUPTHER

## 2018-11-06 RX ORDER — BUPROPION HYDROCHLORIDE 150 MG/1
150 TABLET ORAL EVERY MORNING
Qty: 30 TABLET | Refills: 1 | Status: SHIPPED | OUTPATIENT
Start: 2018-11-06 | End: 2018-12-04 | Stop reason: SDUPTHER

## 2018-11-06 NOTE — PROGRESS NOTES
up to 30 days. . 84 tablet 0    buprenorphine (BUTRANS) 5 MCG/HR PTWK Place 1 patch onto the skin once a week for 28 days. . (Patient taking differently: Place 1 patch onto the skin once a week. Malka Boucher ) 4 patch 0    QUEtiapine (SEROQUEL) 50 MG tablet Take 1 tablet by mouth nightly 60 tablet 1    ULTICARE MINI PEN NEEDLES 31G X 6 MM MISC USE THREE TIMES A  each 4    NOVOLOG FLEXPEN 100 UNIT/ML injection pen BLOOD SUGAR <150 GIVE 5  UNITS:151-200 7UNITS:    201-250 10 UNITS: 641-09855 UNITS: 301-350 14UNITS>351 15 UNITS BEFORE MEAL 15 mL 4    nitroGLYCERIN (NITROSTAT) 0.3 MG SL tablet Place 1 tablet under the tongue every 5 minutes as needed for Chest pain 30 tablet 0    Blood Pressure KIT HTN  110 1 kit 0    Continuous Blood Gluc  (FREESTYLE LISSETT READER) DAYO 1 Device by Does not apply route three times daily 1 Device 0    Continuous Blood Gluc Sensor (FREESTYLE LISSETT SENSOR SYSTEM) [facility] 1 Device by Does not apply route three times daily 250.00 E 11.9 1 each 0    amLODIPine (NORVASC) 10 MG tablet Take 0.5 tablets by mouth daily 30 tablet 5    metoprolol succinate (TOPROL XL) 25 MG extended release tablet Take 0.5 tablets by mouth 2 times daily 60 tablet 5    isosorbide mononitrate (IMDUR) 30 MG extended release tablet Take 1 tablet by mouth daily 30 tablet 5    torsemide (DEMADEX) 20 MG tablet Take 1 tablet by mouth daily 30 tablet 5    hydrALAZINE (APRESOLINE) 50 MG tablet Take 1 tablet by mouth 2 times daily 60 tablet 5    clopidogrel (PLAVIX) 75 MG tablet Take 1 tablet by mouth daily 90 tablet 5    ranolazine (RANEXA) 1000 MG extended release tablet Take 1 tablet by mouth 2 times daily 60 tablet 3    aspirin EC 81 MG EC tablet Take 1 tablet by mouth daily 90 tablet 3    Dulaglutide (TRULICITY) 1.5 TH/8.3IS SOPN Inject 1.5 mg into the skin once a week 4 pen 3    insulin glargine (LANTUS SOLOSTAR) 100 UNIT/ML injection pen Inject 40-50 Units into the skin nightly 40 U in am + 50 U in pm 5 She was advised against drinking alcohol with the narcotic pain medicines, advised against driving or handling machinery while adjusting the dose of medicines or if having cognitive  issues related to the current medications. Risk of overdose and death, if medicines not taken as prescribed, were also discussed. If the patient develops new symptoms or if the symptoms worsen, the patient should call the office. While transcribing every attempt was made to maintain the accuracy of the note in terms of it's contents,there may have been some errors made inadvertently. Thank you for allowing me to participate in the care of this patient.     Leyla Blankenship MD.    Cc: Mathew David MD

## 2018-11-07 ENCOUNTER — OFFICE VISIT (OUTPATIENT)
Dept: PRIMARY CARE CLINIC | Age: 63
End: 2018-11-07
Payer: COMMERCIAL

## 2018-11-07 VITALS
WEIGHT: 126 LBS | OXYGEN SATURATION: 95 % | SYSTOLIC BLOOD PRESSURE: 120 MMHG | HEART RATE: 78 BPM | BODY MASS INDEX: 25.4 KG/M2 | HEIGHT: 59 IN | TEMPERATURE: 98.5 F | DIASTOLIC BLOOD PRESSURE: 70 MMHG

## 2018-11-07 DIAGNOSIS — I10 ESSENTIAL HYPERTENSION: ICD-10-CM

## 2018-11-07 DIAGNOSIS — J20.9 COPD WITH ACUTE BRONCHITIS (HCC): ICD-10-CM

## 2018-11-07 DIAGNOSIS — J44.0 COPD WITH ACUTE BRONCHITIS (HCC): ICD-10-CM

## 2018-11-07 DIAGNOSIS — Z09 HOSPITAL DISCHARGE FOLLOW-UP: ICD-10-CM

## 2018-11-07 LAB — HBA1C MFR BLD: 9.3 %

## 2018-11-07 PROCEDURE — 99214 OFFICE O/P EST MOD 30 MIN: CPT | Performed by: INTERNAL MEDICINE

## 2018-11-07 PROCEDURE — G8419 CALC BMI OUT NRM PARAM NOF/U: HCPCS | Performed by: INTERNAL MEDICINE

## 2018-11-07 PROCEDURE — 1036F TOBACCO NON-USER: CPT | Performed by: INTERNAL MEDICINE

## 2018-11-07 PROCEDURE — 3046F HEMOGLOBIN A1C LEVEL >9.0%: CPT | Performed by: INTERNAL MEDICINE

## 2018-11-07 PROCEDURE — G8427 DOCREV CUR MEDS BY ELIG CLIN: HCPCS | Performed by: INTERNAL MEDICINE

## 2018-11-07 PROCEDURE — 3023F SPIROM DOC REV: CPT | Performed by: INTERNAL MEDICINE

## 2018-11-07 PROCEDURE — 83036 HEMOGLOBIN GLYCOSYLATED A1C: CPT | Performed by: INTERNAL MEDICINE

## 2018-11-07 PROCEDURE — 2022F DILAT RTA XM EVC RTNOPTHY: CPT | Performed by: INTERNAL MEDICINE

## 2018-11-07 PROCEDURE — G8598 ASA/ANTIPLAT THER USED: HCPCS | Performed by: INTERNAL MEDICINE

## 2018-11-07 PROCEDURE — G8482 FLU IMMUNIZE ORDER/ADMIN: HCPCS | Performed by: INTERNAL MEDICINE

## 2018-11-07 PROCEDURE — 1111F DSCHRG MED/CURRENT MED MERGE: CPT | Performed by: INTERNAL MEDICINE

## 2018-11-07 PROCEDURE — 3017F COLORECTAL CA SCREEN DOC REV: CPT | Performed by: INTERNAL MEDICINE

## 2018-11-07 PROCEDURE — G8926 SPIRO NO PERF OR DOC: HCPCS | Performed by: INTERNAL MEDICINE

## 2018-11-07 RX ORDER — AZITHROMYCIN 250 MG/1
TABLET, FILM COATED ORAL
Qty: 1 PACKET | Refills: 0 | Status: SHIPPED | OUTPATIENT
Start: 2018-11-07 | End: 2018-11-11

## 2018-11-07 RX ORDER — BUDESONIDE AND FORMOTEROL FUMARATE DIHYDRATE 160; 4.5 UG/1; UG/1
2 AEROSOL RESPIRATORY (INHALATION) DAILY
Qty: 11 G | Refills: 4 | Status: SHIPPED | OUTPATIENT
Start: 2018-11-07 | End: 2019-12-06 | Stop reason: SDUPTHER

## 2018-11-07 RX ORDER — ALBUTEROL SULFATE 90 UG/1
2 AEROSOL, METERED RESPIRATORY (INHALATION) EVERY 6 HOURS PRN
Qty: 1 INHALER | Refills: 5 | Status: SHIPPED | OUTPATIENT
Start: 2018-11-07 | End: 2019-12-06 | Stop reason: SDUPTHER

## 2018-11-07 ASSESSMENT — ENCOUNTER SYMPTOMS
WHEEZING: 0
ABDOMINAL DISTENTION: 0
COUGH: 1
SHORTNESS OF BREATH: 0
GASTROINTESTINAL NEGATIVE: 1
CHEST TIGHTNESS: 0
SORE THROAT: 1
EYES NEGATIVE: 1
ABDOMINAL PAIN: 0

## 2018-11-08 ENCOUNTER — TELEPHONE (OUTPATIENT)
Dept: CARDIOLOGY CLINIC | Age: 63
End: 2018-11-08

## 2018-11-12 ENCOUNTER — TELEPHONE (OUTPATIENT)
Dept: CARDIOLOGY CLINIC | Age: 63
End: 2018-11-12

## 2018-11-15 ENCOUNTER — HOSPITAL ENCOUNTER (OUTPATIENT)
Dept: CT IMAGING | Age: 63
Discharge: HOME | End: 2018-11-15
Payer: COMMERCIAL

## 2018-11-15 VITALS
WEIGHT: 129.85 LBS | SYSTOLIC BLOOD PRESSURE: 118 MMHG | HEIGHT: 60 IN | OXYGEN SATURATION: 96 % | BODY MASS INDEX: 25.49 KG/M2 | RESPIRATION RATE: 18 BRPM | HEART RATE: 58 BPM | DIASTOLIC BLOOD PRESSURE: 65 MMHG

## 2018-11-15 DIAGNOSIS — R07.9 CHEST PAIN, UNSPECIFIED TYPE: ICD-10-CM

## 2018-11-15 PROCEDURE — 6360000004 HC RX CONTRAST MEDICATION: Performed by: INTERNAL MEDICINE

## 2018-11-15 PROCEDURE — 71275 CT ANGIOGRAPHY CHEST: CPT

## 2018-11-15 PROCEDURE — 99212 OFFICE O/P EST SF 10 MIN: CPT

## 2018-11-15 PROCEDURE — 6370000000 HC RX 637 (ALT 250 FOR IP): Performed by: RADIOLOGY

## 2018-11-15 RX ORDER — METOPROLOL TARTRATE 5 MG/5ML
5 INJECTION INTRAVENOUS ONCE
Status: DISCONTINUED | OUTPATIENT
Start: 2018-11-15 | End: 2018-11-16 | Stop reason: HOSPADM

## 2018-11-15 RX ORDER — NITROGLYCERIN 0.4 MG/1
0.4 TABLET SUBLINGUAL ONCE
Status: COMPLETED | OUTPATIENT
Start: 2018-11-15 | End: 2018-11-15

## 2018-11-15 RX ADMIN — NITROGLYCERIN 0.4 MG: 0.4 TABLET SUBLINGUAL at 09:15

## 2018-11-15 RX ADMIN — IOPAMIDOL 100 ML: 755 INJECTION, SOLUTION INTRAVENOUS at 09:21

## 2018-11-16 DIAGNOSIS — I25.119 CORONARY ARTERY DISEASE INVOLVING NATIVE CORONARY ARTERY OF NATIVE HEART WITH ANGINA PECTORIS (HCC): Primary | ICD-10-CM

## 2018-11-18 ENCOUNTER — APPOINTMENT (OUTPATIENT)
Dept: CT IMAGING | Age: 63
End: 2018-11-18
Payer: COMMERCIAL

## 2018-11-18 ENCOUNTER — TELEPHONE (OUTPATIENT)
Dept: OTHER | Facility: CLINIC | Age: 63
End: 2018-11-18

## 2018-11-18 ENCOUNTER — APPOINTMENT (OUTPATIENT)
Dept: GENERAL RADIOLOGY | Age: 63
End: 2018-11-18
Payer: COMMERCIAL

## 2018-11-18 ENCOUNTER — HOSPITAL ENCOUNTER (EMERGENCY)
Age: 63
Discharge: HOME | End: 2018-11-19
Attending: EMERGENCY MEDICINE
Payer: COMMERCIAL

## 2018-11-18 DIAGNOSIS — I25.9 CHEST PAIN DUE TO MYOCARDIAL ISCHEMIA, UNSPECIFIED ISCHEMIC CHEST PAIN TYPE: Primary | ICD-10-CM

## 2018-11-18 LAB
ANION GAP SERPL CALCULATED.3IONS-SCNC: 12 MMOL/L (ref 3–16)
BASOPHILS ABSOLUTE: 0 K/UL (ref 0–0.2)
BASOPHILS RELATIVE PERCENT: 0.4 %
BUN BLDV-MCNC: 20 MG/DL (ref 7–20)
CALCIUM SERPL-MCNC: 9.4 MG/DL (ref 8.3–10.6)
CHLORIDE BLD-SCNC: 104 MMOL/L (ref 99–110)
CO2: 22 MMOL/L (ref 21–32)
CREAT SERPL-MCNC: 1.3 MG/DL (ref 0.6–1.2)
EOSINOPHILS ABSOLUTE: 0.1 K/UL (ref 0–0.6)
EOSINOPHILS RELATIVE PERCENT: 1.6 %
GFR AFRICAN AMERICAN: 50
GFR NON-AFRICAN AMERICAN: 41
GLUCOSE BLD-MCNC: 259 MG/DL (ref 70–99)
HCT VFR BLD CALC: 31.8 % (ref 36–48)
HEMOGLOBIN: 10.4 G/DL (ref 12–16)
LYMPHOCYTES ABSOLUTE: 1.9 K/UL (ref 1–5.1)
LYMPHOCYTES RELATIVE PERCENT: 27.7 %
MCH RBC QN AUTO: 32.8 PG (ref 26–34)
MCHC RBC AUTO-ENTMCNC: 32.7 G/DL (ref 31–36)
MCV RBC AUTO: 100.2 FL (ref 80–100)
MONOCYTES ABSOLUTE: 0.3 K/UL (ref 0–1.3)
MONOCYTES RELATIVE PERCENT: 4.9 %
NEUTROPHILS ABSOLUTE: 4.5 K/UL (ref 1.7–7.7)
NEUTROPHILS RELATIVE PERCENT: 65.4 %
PDW BLD-RTO: 15.1 % (ref 12.4–15.4)
PLATELET # BLD: 226 K/UL (ref 135–450)
PMV BLD AUTO: 8.1 FL (ref 5–10.5)
POTASSIUM REFLEX MAGNESIUM: 4.8 MMOL/L (ref 3.5–5.1)
RBC # BLD: 3.18 M/UL (ref 4–5.2)
SODIUM BLD-SCNC: 138 MMOL/L (ref 136–145)
TROPONIN: <0.01 NG/ML
WBC # BLD: 7 K/UL (ref 4–11)

## 2018-11-18 PROCEDURE — 96376 TX/PRO/DX INJ SAME DRUG ADON: CPT

## 2018-11-18 PROCEDURE — 96361 HYDRATE IV INFUSION ADD-ON: CPT

## 2018-11-18 PROCEDURE — 71275 CT ANGIOGRAPHY CHEST: CPT

## 2018-11-18 PROCEDURE — 6360000004 HC RX CONTRAST MEDICATION: Performed by: EMERGENCY MEDICINE

## 2018-11-18 PROCEDURE — 6370000000 HC RX 637 (ALT 250 FOR IP): Performed by: EMERGENCY MEDICINE

## 2018-11-18 PROCEDURE — 96375 TX/PRO/DX INJ NEW DRUG ADDON: CPT

## 2018-11-18 PROCEDURE — 99285 EMERGENCY DEPT VISIT HI MDM: CPT

## 2018-11-18 PROCEDURE — 85025 COMPLETE CBC W/AUTO DIFF WBC: CPT

## 2018-11-18 PROCEDURE — 2580000003 HC RX 258: Performed by: EMERGENCY MEDICINE

## 2018-11-18 PROCEDURE — 71045 X-RAY EXAM CHEST 1 VIEW: CPT

## 2018-11-18 PROCEDURE — 80048 BASIC METABOLIC PNL TOTAL CA: CPT

## 2018-11-18 PROCEDURE — 96374 THER/PROPH/DIAG INJ IV PUSH: CPT

## 2018-11-18 PROCEDURE — 84484 ASSAY OF TROPONIN QUANT: CPT

## 2018-11-18 PROCEDURE — 93005 ELECTROCARDIOGRAM TRACING: CPT | Performed by: EMERGENCY MEDICINE

## 2018-11-18 PROCEDURE — 6360000002 HC RX W HCPCS: Performed by: EMERGENCY MEDICINE

## 2018-11-18 RX ORDER — ASPIRIN 81 MG/1
324 TABLET, CHEWABLE ORAL ONCE
Status: COMPLETED | OUTPATIENT
Start: 2018-11-18 | End: 2018-11-18

## 2018-11-18 RX ORDER — 0.9 % SODIUM CHLORIDE 0.9 %
500 INTRAVENOUS SOLUTION INTRAVENOUS ONCE
Status: COMPLETED | OUTPATIENT
Start: 2018-11-18 | End: 2018-11-19

## 2018-11-18 RX ORDER — FENTANYL CITRATE 50 UG/ML
25 INJECTION, SOLUTION INTRAMUSCULAR; INTRAVENOUS ONCE
Status: COMPLETED | OUTPATIENT
Start: 2018-11-18 | End: 2018-11-18

## 2018-11-18 RX ORDER — ONDANSETRON 2 MG/ML
4 INJECTION INTRAMUSCULAR; INTRAVENOUS ONCE
Status: COMPLETED | OUTPATIENT
Start: 2018-11-18 | End: 2018-11-18

## 2018-11-18 RX ADMIN — IOPAMIDOL 75 ML: 755 INJECTION, SOLUTION INTRAVENOUS at 22:22

## 2018-11-18 RX ADMIN — ASPIRIN 324 MG: 81 TABLET, CHEWABLE ORAL at 20:38

## 2018-11-18 RX ADMIN — FENTANYL CITRATE 25 MCG: 50 INJECTION, SOLUTION INTRAMUSCULAR; INTRAVENOUS at 21:51

## 2018-11-18 RX ADMIN — FENTANYL CITRATE 25 MCG: 50 INJECTION, SOLUTION INTRAMUSCULAR; INTRAVENOUS at 23:56

## 2018-11-18 RX ADMIN — SODIUM CHLORIDE 500 ML: 9 INJECTION, SOLUTION INTRAVENOUS at 21:56

## 2018-11-18 RX ADMIN — ONDANSETRON 4 MG: 2 INJECTION, SOLUTION INTRAMUSCULAR; INTRAVENOUS at 21:50

## 2018-11-18 ASSESSMENT — PAIN DESCRIPTION - PAIN TYPE
TYPE: CHRONIC PAIN
TYPE: ACUTE PAIN
TYPE: ACUTE PAIN

## 2018-11-18 ASSESSMENT — PAIN DESCRIPTION - LOCATION
LOCATION: CHEST
LOCATION: CHEST

## 2018-11-18 ASSESSMENT — PAIN SCALES - GENERAL
PAINLEVEL_OUTOF10: 8
PAINLEVEL_OUTOF10: 7
PAINLEVEL_OUTOF10: 4
PAINLEVEL_OUTOF10: 9
PAINLEVEL_OUTOF10: 8

## 2018-11-18 ASSESSMENT — ENCOUNTER SYMPTOMS
BACK PAIN: 1
SORE THROAT: 0
ABDOMINAL PAIN: 0
SHORTNESS OF BREATH: 0
EYE REDNESS: 0
RHINORRHEA: 0

## 2018-11-19 ENCOUNTER — TELEPHONE (OUTPATIENT)
Dept: PRIMARY CARE CLINIC | Age: 63
End: 2018-11-19

## 2018-11-19 ENCOUNTER — TELEPHONE (OUTPATIENT)
Dept: OTHER | Facility: CLINIC | Age: 63
End: 2018-11-19

## 2018-11-19 VITALS
RESPIRATION RATE: 12 BRPM | DIASTOLIC BLOOD PRESSURE: 75 MMHG | WEIGHT: 127.65 LBS | TEMPERATURE: 98.6 F | BODY MASS INDEX: 24.93 KG/M2 | OXYGEN SATURATION: 100 % | HEART RATE: 63 BPM | SYSTOLIC BLOOD PRESSURE: 134 MMHG

## 2018-11-19 LAB
EKG ATRIAL RATE: 85 BPM
EKG DIAGNOSIS: NORMAL
EKG P AXIS: -6 DEGREES
EKG P-R INTERVAL: 108 MS
EKG Q-T INTERVAL: 350 MS
EKG QRS DURATION: 82 MS
EKG QTC CALCULATION (BAZETT): 416 MS
EKG R AXIS: 45 DEGREES
EKG T AXIS: 89 DEGREES
EKG VENTRICULAR RATE: 85 BPM
TROPONIN: <0.01 NG/ML

## 2018-11-19 PROCEDURE — 93010 ELECTROCARDIOGRAM REPORT: CPT | Performed by: INTERNAL MEDICINE

## 2018-11-19 PROCEDURE — 84484 ASSAY OF TROPONIN QUANT: CPT

## 2018-11-19 PROCEDURE — 6360000002 HC RX W HCPCS: Performed by: EMERGENCY MEDICINE

## 2018-11-19 PROCEDURE — 96376 TX/PRO/DX INJ SAME DRUG ADON: CPT

## 2018-11-19 RX ORDER — ONDANSETRON 2 MG/ML
4 INJECTION INTRAMUSCULAR; INTRAVENOUS ONCE
Status: COMPLETED | OUTPATIENT
Start: 2018-11-19 | End: 2018-11-19

## 2018-11-19 RX ADMIN — ONDANSETRON 4 MG: 2 INJECTION, SOLUTION INTRAMUSCULAR; INTRAVENOUS at 00:56

## 2018-11-19 ASSESSMENT — PAIN SCALES - GENERAL
PAINLEVEL_OUTOF10: 6
PAINLEVEL_OUTOF10: 6

## 2018-11-19 ASSESSMENT — PAIN - FUNCTIONAL ASSESSMENT: PAIN_FUNCTIONAL_ASSESSMENT: 0-10

## 2018-11-19 ASSESSMENT — PAIN DESCRIPTION - LOCATION: LOCATION: CHEST

## 2018-11-19 ASSESSMENT — PAIN DESCRIPTION - PAIN TYPE
TYPE: CHRONIC PAIN
TYPE: CHRONIC PAIN

## 2018-11-19 NOTE — ED NOTES
Bed: [facility]  Expected date:   Expected time:   Means of arrival:   Comments:  [place] Chest Pain     Flavio Andrew RN  11/18/18 2021

## 2018-11-19 NOTE — TELEPHONE ENCOUNTER
Writer contacted Dr. Haris Zuniga, ED provider to inform of 30 day readmission risk. ED provider informed writer of intention to discharge IF CTA and 0100 troponin are negative per her cardiologist Dr. Rashmi Velasquez.   Stated Dr. Rashmi Velasquez has plans to f/u with patient and no PCP ED  F/u will be required

## 2018-11-19 NOTE — ED PROVIDER NOTES
Emergency Department Encounter  Location: [facility] EMERGENCY DEPARTMENT    Patient: Ananda Knutson  MRN: [id number]  : 1955  Date of evaluation: 2018  ED Provider: Eddy SHIRLEY MD    11 pm  Ananda Knutson was checked out to me by Piotr Murcia. Please see his/her initial documentation for details of the patient's initial ED presentation, physical exam and completed studies. In brief, Ananda Knutson is a 59 y.o. female that presented to the emergency department Chronic chest pain. Patient has ischemic disease. Patient has been seen by her cardiologist.She has negative troponin. Waiting for the second set of troponin.     I have reviewed and interpreted all of the currently available lab results and diagnostics from this visit:  Results for orders placed or performed during the hospital encounter of 18   CBC Auto Differential   Result Value Ref Range    WBC 7.0 4.0 - 11.0 K/uL    RBC 3.18 (L) 4.00 - 5.20 M/uL    Hemoglobin 10.4 (L) 12.0 - 16.0 g/dL    Hematocrit 31.8 (L) 36.0 - 48.0 %    .2 (H) 80.0 - 100.0 fL    MCH 32.8 26.0 - 34.0 pg    MCHC 32.7 31.0 - 36.0 g/dL    RDW 15.1 12.4 - 15.4 %    Platelets 635 346 - 587 K/uL    MPV 8.1 5.0 - 10.5 fL    Neutrophils % 65.4 %    Lymphocytes % 27.7 %    Monocytes % 4.9 %    Eosinophils % 1.6 %    Basophils % 0.4 %    Neutrophils # 4.5 1.7 - 7.7 K/uL    Lymphocytes # 1.9 1.0 - 5.1 K/uL    Monocytes # 0.3 0.0 - 1.3 K/uL    Eosinophils # 0.1 0.0 - 0.6 K/uL    Basophils # 0.0 0.0 - 0.2 K/uL   Basic Metabolic Panel w/ Reflex to MG   Result Value Ref Range    Sodium 138 136 - 145 mmol/L    Potassium reflex Magnesium 4.8 3.5 - 5.1 mmol/L    Chloride 104 99 - 110 mmol/L    CO2 22 21 - 32 mmol/L    Anion Gap 12 3 - 16    Glucose 259 (H) 70 - 99 mg/dL    BUN 20 7 - 20 mg/dL    CREATININE 1.3 (H) 0.6 - 1.2 mg/dL    GFR Non- 41 (A) >60    GFR  50 (A) >60    Calcium 9.4 8.3 - 10.6 mg/dL   Troponin Result Value Ref Range    Troponin <0.01 <0.01 ng/mL   Troponin   Result Value Ref Range    Troponin <0.01 <0.01 ng/mL     Xr Chest Portable    Result Date: 11/18/2018  EXAMINATION: SINGLE XRAY VIEW OF THE CHEST 11/18/2018 8:35 pm COMPARISON: 10/30/2018 HISTORY: ORDERING SYSTEM PROVIDED HISTORY: Chest Pain TECHNOLOGIST PROVIDED HISTORY: Reason for exam:->Chest Pain Ordering Physician Provided Reason for Exam: chest pain,hx stent Acuity: Acute Type of Exam: Initial FINDINGS: The heart size is normal.  There is no pneumothorax, edema or focal consolidation. The bones are demineralized. The bony thorax is grossly intact. Surgical clips are noted in the right upper quadrant. No evidence of acute cardiopulmonary disease. Cta Chest W Contrast    Result Date: 11/18/2018  EXAMINATION: CTA OF THE CHEST 11/18/2018 10:25 pm TECHNIQUE: CTA of the chest was performed after the administration of intravenous contrast.  Multiplanar reformatted images are provided for review. MIP images are provided for review. Dose modulation, iterative reconstruction, and/or weight based adjustment of the mA/kV was utilized to reduce the radiation dose to as low as reasonably achievable. COMPARISON: Chest radiograph today. HISTORY: ORDERING SYSTEM PROVIDED HISTORY: CHEST PAIN, ACUTE, NONSPECIFIC, LOW PROB CAD TECHNOLOGIST PROVIDED HISTORY: Ordering Physician Provided Reason for Exam: Chest pain, acute, nonspecific, low prob CAD; ddx: pe, DISSECTION, cad. sEE recent CTA Cardiac Scan Acuity: Acute Type of Exam: Initial FINDINGS: Pulmonary Arteries: Pulmonary arteries are adequately opacified for evaluation. No evidence of intraluminal filling defect to suggest pulmonary embolism. Main pulmonary artery is normal in caliber. Mediastinum: No evidence of mediastinal lymphadenopathy. The heart and pericardium demonstrate no acute abnormality. There is no acute abnormality of the thoracic aorta.   Findings compatible with innominate

## 2018-11-19 NOTE — ED PROVIDER NOTES
[facility]  eMERGENCY dEPARTMENT eNCOUnter      Pt Name: Drea Tristan  MRN: [id number]  [place] 1955  Date of evaluation: 11/18/2018  Provider: Suzanna Smith MD    CHIEF COMPLAINT       Chief Complaint   Patient presents with    Chest Pain     Onset 1915, took 3 nitro at home and 1 in route from EMS, pain decreased 9 to 6.  + cardiac hx, stent placed in July. HISTORY OF PRESENT ILLNESS   (Location/Symptom, Timing/Onset,Context/Setting, Quality, Duration, Modifying Factors, Severity)  Note limiting factors. Drea Tristan is a 59 y.o. female who presents to the emergency department c/o chest pain. This patient has a past medical history significant of coronary artery disease as well as chronic pain secondary to fibromyalgia. She has had many recent emergency department visits and a recent hospitalization. She recently underwent a cardiac chest CT which was not diagnostic. She comes in tonight because she developed chest pain at approximate 7:30 PM this evening. She states she was watching TV when she developed sharp back pain that went up towards her neck and then went into her anterior chest.  It lasted 20-30 minutes with some associated nausea. No vomiting. No diaphoresis. She reports now that the left side of her face just feels slightly numb. She denies any weakness or numbness. No speech abnormality. She currently states she barely has any chest pain. She rates a 1 out of 10. A mild ache in her left anterior chest.  No ripping or tearing pain. She does describe it as a stabbing pain in her back earlier though. HPI    NursingNotes were reviewed. REVIEW OF SYSTEMS    (2-9 systems for level 4, 10 or more for level 5)     Review of Systems   Constitutional: Negative for fever. HENT: Negative for rhinorrhea and sore throat. Eyes: Negative for redness. Respiratory: Negative for shortness of breath.     Cardiovascular: Positive for chest pain. Gastrointestinal: Negative for abdominal pain. Genitourinary: Negative for flank pain. Musculoskeletal: Positive for back pain. Neurological: Negative for headaches. Left cheek numbness   Hematological: Negative for adenopathy. Psychiatric/Behavioral: Negative for confusion. Except as noted above the remainder of the review of systems was reviewed and negative. PAST MEDICAL HISTORY     Past Medical History:   Diagnosis Date    Acid reflux     Anemia     Anxiety     Arthritis     Asthma     Atrial fibrillation ([facility])     Blood transfusion reaction     CAD (coronary artery disease) 12/3/2012    CHF (congestive heart failure) ([facility])     Chronic kidney disease     40% kidney functiom    COPD (chronic obstructive pulmonary disease) ([facility])     Depression     DM2 (diabetes mellitus, type 2) ([facility])     Dysarthria     Fibromyalgia 6/7/2016    Headache(784.0) 2/19/2013    Hemisensory loss     Hyperlipidemia     Hypertension     IBS (irritable bowel syndrome)     Inferior vena cava occlusion ([facility])     Irritable bowel syndrome     Keratitis     Neuropathy     Superior vena cava obstruction     Temporal arteritis ([address] 2/24/2014         SURGICALHISTORY       Past Surgical History:   Procedure Laterality Date    ABLATION OF DYSRHYTHMIC FOCUS      ARTERY BIOPSY Right 04/23/2014    RIGHT TEMPORAL ARTERY BIOPSY    CATARACT REMOVAL Bilateral     CHOLECYSTECTOMY      CORONARY ANGIOPLASTY WITH STENT PLACEMENT      CORONARY ANGIOPLASTY WITH STENT PLACEMENT      HYSTERECTOMY      JOINT REPLACEMENT Right     KNEE ARTHROSCOPY Right     KNEE SURGERY      right knee replacement    TUNNELED VENOUS PORT PLACEMENT      left thigh.      VASCULAR SURGERY           CURRENT MEDICATIONS       Previous Medications    ALBUTEROL SULFATE HFA (PROAIR HFA) 108 (90 BASE) MCG/ACT INHALER    Inhale 2 puffs into the lungs every 6 hours as needed for Wheezing    AMLODIPINE EXTENDED RELEASE TABLET    Take 1 tablet by mouth 2 times daily    TOPIRAMATE (TOPAMAX) 100 MG TABLET    Take 1 tablet by mouth 2 times daily    TORSEMIDE (DEMADEX) 20 MG TABLET    Take 1 tablet by mouth daily       ALLERGIES     Patient has no known allergies. FAMILY HISTORY       Family History   Problem Relation Age of Onset    Diabetes Mother     Hypertension Mother     High Cholesterol Mother     Stroke Mother     Cancer Mother           SOCIAL HISTORY       Social History     Social History    Marital status:      Spouse name: N/A    Number of children: 6    Years of education: N/A     Social History Main Topics    Smoking status: Former Smoker     Packs/day: 0.50     Years: 35.00     Types: Cigarettes     Quit date: 4/26/2015    Smokeless tobacco: Never Used      Comment: 5/13/15 has not smoked since hospitalization - kh    Alcohol use No    Drug use: No    Sexual activity: Yes     Partners: Male     Other Topics Concern    None     Social History Narrative    None       SCREENINGS             PHYSICAL EXAM    (up to 7 for level 4, 8 or more for level 5)     ED Triage Vitals [11/18/18 2022]   BP Temp Temp Source Pulse Resp SpO2 Height Weight   129/80 98.6 °F (37 °C) Oral 84 16 96 % -- 127 lb 10.3 oz (57.9 kg)       Physical Exam   Constitutional: She is oriented to person, place, and time. She appears well-developed and well-nourished. HENT:   Head: Normocephalic and atraumatic. Eyes: Conjunctivae are normal. Right eye exhibits no discharge. Left eye exhibits no discharge. Neck: Neck supple. Cardiovascular: Normal rate, regular rhythm and normal heart sounds. Pulses equal in her upper and lower external knees. Pulmonary/Chest: Effort normal and breath sounds normal. No respiratory distress. She has no wheezes. She has no rales. Abdominal: Soft. Bowel sounds are normal. She exhibits no distension and no mass. There is no tenderness. There is no guarding.

## 2018-11-20 ENCOUNTER — TELEPHONE (OUTPATIENT)
Dept: CARDIOLOGY CLINIC | Age: 63
End: 2018-11-20

## 2018-11-20 ENCOUNTER — TELEPHONE (OUTPATIENT)
Dept: OTHER | Facility: CLINIC | Age: 63
End: 2018-11-20

## 2018-11-20 NOTE — TELEPHONE ENCOUNTER
This is a patient of Dr. Katie Carpio. Please call to notify that he is OOT. She can call the  to schedule his next available appointment, thanks.

## 2018-11-21 ENCOUNTER — TELEPHONE (OUTPATIENT)
Dept: CARDIOLOGY CLINIC | Age: 63
End: 2018-11-21

## 2018-11-21 NOTE — TELEPHONE ENCOUNTER
There are 3 encounter on the same patient for the same discussion iwithin the last 5 days. I am closing this one. Please refer to 11/16 and 11/21/18.

## 2018-11-21 NOTE — TELEPHONE ENCOUNTER
There are 3 open encounter on the same patient for the same discussion within the last 5 days. I am closing this one. Please refer to the current working one 11/16/18 and I closed 11/20/18.

## 2018-11-26 RX ORDER — METOPROLOL SUCCINATE 50 MG/1
50 TABLET, EXTENDED RELEASE ORAL DAILY
Qty: 30 TABLET | Refills: 3 | Status: SHIPPED | OUTPATIENT
Start: 2018-11-26 | End: 2019-03-06 | Stop reason: SDUPTHER

## 2018-11-29 ENCOUNTER — TELEPHONE (OUTPATIENT)
Dept: OTHER | Facility: CLINIC | Age: 63
End: 2018-11-29

## 2018-11-29 ENCOUNTER — TELEPHONE (OUTPATIENT)
Dept: CARDIOLOGY CLINIC | Age: 63
End: 2018-11-29

## 2018-11-29 ENCOUNTER — HOSPITAL ENCOUNTER (EMERGENCY)
Age: 63
Discharge: HOME | End: 2018-11-29
Attending: EMERGENCY MEDICINE
Payer: COMMERCIAL

## 2018-11-29 ENCOUNTER — APPOINTMENT (OUTPATIENT)
Dept: GENERAL RADIOLOGY | Age: 63
End: 2018-11-29
Payer: COMMERCIAL

## 2018-11-29 VITALS
TEMPERATURE: 98.3 F | SYSTOLIC BLOOD PRESSURE: 133 MMHG | HEART RATE: 66 BPM | OXYGEN SATURATION: 100 % | RESPIRATION RATE: 9 BRPM | DIASTOLIC BLOOD PRESSURE: 121 MMHG

## 2018-11-29 DIAGNOSIS — I20.0 UNSTABLE ANGINA PECTORIS (HCC): Primary | ICD-10-CM

## 2018-11-29 LAB
A/G RATIO: 1 (ref 1.1–2.2)
ALBUMIN SERPL-MCNC: 3.8 G/DL (ref 3.4–5)
ALP BLD-CCNC: 123 U/L (ref 40–129)
ALT SERPL-CCNC: 14 U/L (ref 10–40)
ANION GAP SERPL CALCULATED.3IONS-SCNC: 13 MMOL/L (ref 3–16)
APTT: 41.8 SEC (ref 26–36)
AST SERPL-CCNC: 15 U/L (ref 15–37)
BASOPHILS ABSOLUTE: 0 K/UL (ref 0–0.2)
BASOPHILS RELATIVE PERCENT: 0.4 %
BILIRUB SERPL-MCNC: <0.2 MG/DL (ref 0–1)
BUN BLDV-MCNC: 17 MG/DL (ref 7–20)
CALCIUM SERPL-MCNC: 9.3 MG/DL (ref 8.3–10.6)
CHLORIDE BLD-SCNC: 98 MMOL/L (ref 99–110)
CO2: 20 MMOL/L (ref 21–32)
CREAT SERPL-MCNC: 1.1 MG/DL (ref 0.6–1.2)
EKG ATRIAL RATE: 76 BPM
EKG DIAGNOSIS: NORMAL
EKG P AXIS: -15 DEGREES
EKG P-R INTERVAL: 86 MS
EKG Q-T INTERVAL: 570 MS
EKG QRS DURATION: 90 MS
EKG QTC CALCULATION (BAZETT): 641 MS
EKG R AXIS: 31 DEGREES
EKG T AXIS: 100 DEGREES
EKG VENTRICULAR RATE: 76 BPM
EOSINOPHILS ABSOLUTE: 0.1 K/UL (ref 0–0.6)
EOSINOPHILS RELATIVE PERCENT: 2.7 %
GFR AFRICAN AMERICAN: >60
GFR NON-AFRICAN AMERICAN: 50
GLOBULIN: 3.9 G/DL
GLUCOSE BLD-MCNC: 276 MG/DL (ref 70–99)
HCT VFR BLD CALC: 33.8 % (ref 36–48)
HEMOGLOBIN: 11 G/DL (ref 12–16)
INR BLD: 0.93 (ref 0.86–1.14)
LYMPHOCYTES ABSOLUTE: 1.2 K/UL (ref 1–5.1)
LYMPHOCYTES RELATIVE PERCENT: 22.2 %
MCH RBC QN AUTO: 32.8 PG (ref 26–34)
MCHC RBC AUTO-ENTMCNC: 32.5 G/DL (ref 31–36)
MCV RBC AUTO: 100.9 FL (ref 80–100)
MONOCYTES ABSOLUTE: 0.2 K/UL (ref 0–1.3)
MONOCYTES RELATIVE PERCENT: 4.3 %
NEUTROPHILS ABSOLUTE: 3.8 K/UL (ref 1.7–7.7)
NEUTROPHILS RELATIVE PERCENT: 70.4 %
PDW BLD-RTO: 14.7 % (ref 12.4–15.4)
PLATELET # BLD: 180 K/UL (ref 135–450)
PMV BLD AUTO: 8.6 FL (ref 5–10.5)
POTASSIUM REFLEX MAGNESIUM: 3.7 MMOL/L (ref 3.5–5.1)
PROTHROMBIN TIME: 10.6 SEC (ref 9.8–13)
RBC # BLD: 3.35 M/UL (ref 4–5.2)
SODIUM BLD-SCNC: 131 MMOL/L (ref 136–145)
TOTAL PROTEIN: 7.7 G/DL (ref 6.4–8.2)
TROPONIN: <0.01 NG/ML
TROPONIN: <0.01 NG/ML
WBC # BLD: 5.4 K/UL (ref 4–11)

## 2018-11-29 PROCEDURE — 96375 TX/PRO/DX INJ NEW DRUG ADDON: CPT

## 2018-11-29 PROCEDURE — 6360000002 HC RX W HCPCS: Performed by: PHYSICIAN ASSISTANT

## 2018-11-29 PROCEDURE — 36415 COLL VENOUS BLD VENIPUNCTURE: CPT

## 2018-11-29 PROCEDURE — 85610 PROTHROMBIN TIME: CPT

## 2018-11-29 PROCEDURE — 80053 COMPREHEN METABOLIC PANEL: CPT

## 2018-11-29 PROCEDURE — 93005 ELECTROCARDIOGRAM TRACING: CPT | Performed by: PHYSICIAN ASSISTANT

## 2018-11-29 PROCEDURE — 71045 X-RAY EXAM CHEST 1 VIEW: CPT

## 2018-11-29 PROCEDURE — 96374 THER/PROPH/DIAG INJ IV PUSH: CPT

## 2018-11-29 PROCEDURE — 85730 THROMBOPLASTIN TIME PARTIAL: CPT

## 2018-11-29 PROCEDURE — 84484 ASSAY OF TROPONIN QUANT: CPT

## 2018-11-29 PROCEDURE — 93010 ELECTROCARDIOGRAM REPORT: CPT | Performed by: INTERNAL MEDICINE

## 2018-11-29 PROCEDURE — 6360000002 HC RX W HCPCS: Performed by: EMERGENCY MEDICINE

## 2018-11-29 PROCEDURE — 96376 TX/PRO/DX INJ SAME DRUG ADON: CPT

## 2018-11-29 PROCEDURE — 85025 COMPLETE CBC W/AUTO DIFF WBC: CPT

## 2018-11-29 PROCEDURE — 99285 EMERGENCY DEPT VISIT HI MDM: CPT

## 2018-11-29 RX ORDER — INSULIN GLARGINE 100 [IU]/ML
50 INJECTION, SOLUTION SUBCUTANEOUS NIGHTLY
COMMUNITY
End: 2019-01-11 | Stop reason: SDUPTHER

## 2018-11-29 RX ORDER — MORPHINE SULFATE 4 MG/ML
4 INJECTION, SOLUTION INTRAMUSCULAR; INTRAVENOUS ONCE
Status: COMPLETED | OUTPATIENT
Start: 2018-11-29 | End: 2018-11-29

## 2018-11-29 RX ORDER — DIPHENHYDRAMINE HYDROCHLORIDE 50 MG/ML
12.5 INJECTION INTRAMUSCULAR; INTRAVENOUS ONCE
Status: COMPLETED | OUTPATIENT
Start: 2018-11-29 | End: 2018-11-29

## 2018-11-29 RX ORDER — LORAZEPAM 2 MG/ML
0.5 INJECTION INTRAMUSCULAR ONCE
Status: COMPLETED | OUTPATIENT
Start: 2018-11-29 | End: 2018-11-29

## 2018-11-29 RX ORDER — INSULIN GLARGINE 100 [IU]/ML
INJECTION, SOLUTION SUBCUTANEOUS EVERY MORNING
COMMUNITY
End: 2019-02-05 | Stop reason: SDUPTHER

## 2018-11-29 RX ADMIN — MORPHINE SULFATE 4 MG: 4 INJECTION INTRAVENOUS at 12:10

## 2018-11-29 RX ADMIN — DIPHENHYDRAMINE HYDROCHLORIDE 12.5 MG: 50 INJECTION, SOLUTION INTRAMUSCULAR; INTRAVENOUS at 11:23

## 2018-11-29 RX ADMIN — MORPHINE SULFATE 4 MG: 4 INJECTION INTRAVENOUS at 10:02

## 2018-11-29 RX ADMIN — LORAZEPAM 0.5 MG: 2 INJECTION INTRAMUSCULAR; INTRAVENOUS at 11:24

## 2018-11-29 ASSESSMENT — PAIN SCALES - GENERAL
PAINLEVEL_OUTOF10: 10
PAINLEVEL_OUTOF10: 7
PAINLEVEL_OUTOF10: 10
PAINLEVEL_OUTOF10: 8
PAINLEVEL_OUTOF10: 3

## 2018-11-29 ASSESSMENT — PAIN DESCRIPTION - PAIN TYPE
TYPE: ACUTE PAIN
TYPE: ACUTE PAIN

## 2018-11-29 ASSESSMENT — PAIN DESCRIPTION - LOCATION
LOCATION: CHEST
LOCATION: CHEST

## 2018-11-29 ASSESSMENT — HEART SCORE: ECG: 0

## 2018-11-29 NOTE — PROGRESS NOTES
Medication Reconciliation    List of medications patient is currently taking is complete. Source of information: 1. Conversation with patient at bedside                                      2. EPIC records                                       3. Patient provided medication list     Allergies  Patient has no known allergies. Notes regarding home medications:   1. Patient received her Aspirin and Ranexa prior to arrival to the emergency department. Patient has not taken any of her other home medications yet today. 2. Patient takes Trulicity 1.5 mg SQ [id number] on Saturdays.   3. Patient applies a Butrans 5 mcg/hr TD patch Q7Days on Thursdays - she is due to apply a new patch today; she will have her daughter provide a new patch to be applied if she is admitted to the hospital.    Margy Naqvi, PharmD, BCPS  11/29/2018 11:23 AM

## 2018-11-29 NOTE — ED NOTES
Bed: B-06  Expected date:   Expected time:   Means of arrival: [facility]  Comments:  63f chest pain     Tunde Benson, RN  11/29/18 2728

## 2018-11-29 NOTE — TELEPHONE ENCOUNTER
Dr. Beverly Hassan,   I have personally spoken to Sirisha repeatedly the last several weeks. Please see below. Paola Mejia is working to schedule [facility]

## 2018-11-29 NOTE — ED PROVIDER NOTES
Irritable bowel syndrome     Keratitis     Neuropathy     Superior vena cava obstruction     Temporal arteritis ([address] 2/24/2014     Past Surgical History:   Procedure Laterality Date    ABLATION OF DYSRHYTHMIC FOCUS      ARTERY BIOPSY Right 04/23/2014    RIGHT TEMPORAL ARTERY BIOPSY    CATARACT REMOVAL Bilateral     CHOLECYSTECTOMY      CORONARY ANGIOPLASTY WITH STENT PLACEMENT      CORONARY ANGIOPLASTY WITH STENT PLACEMENT      HYSTERECTOMY      JOINT REPLACEMENT Right     KNEE ARTHROSCOPY Right     KNEE SURGERY      right knee replacement    TUNNELED VENOUS PORT PLACEMENT      left thigh.      VASCULAR SURGERY         CURRENT MEDICATIONS  (may include discharge medications prescribed in the ED)  Current Outpatient Rx   Medication Sig Dispense Refill    insulin glargine (LANTUS) 100 UNIT/ML injection vial Inject 40 Units into the skin every morning      insulin glargine (LANTUS) 100 UNIT/ML injection vial Inject 50 Units into the skin nightly      metoprolol succinate (TOPROL XL) 50 MG extended release tablet Take 1 tablet by mouth daily 30 tablet 3    Dulaglutide (TRULICITY) 1.5 GP/0.6TV SOPN Inject 1.5 mg into the skin once a week 4 pen 3    insulin aspart (NOVOLOG FLEXPEN) 100 UNIT/ML injection pen Inject 5-10 Units into the skin 3 times daily (before meals) 5 pen 4    budesonide-formoterol (SYMBICORT) 160-4.5 MCG/ACT AERO Inhale 2 puffs into the lungs daily (Patient taking differently: Inhale 2 puffs into the lungs daily as needed ) 11 g 4    albuterol sulfate HFA (PROAIR HFA) 108 (90 Base) MCG/ACT inhaler Inhale 2 puffs into the lungs every 6 hours as needed for Wheezing 1 Inhaler 5    topiramate (TOPAMAX) 100 MG tablet Take 1 tablet by mouth 2 times daily 60 tablet 1    buPROPion (WELLBUTRIN XL) 150 MG extended release tablet Take 1 tablet by mouth every morning 30 tablet 1    DULoxetine (CYMBALTA) 60 MG extended release capsule Take 1 capsule by mouth daily 30 capsule 0    Comment: 5/13/15 has not smoked since hospitalization - kh    Alcohol use No    Drug use: No    Sexual activity: Yes     Partners: Male     Other Topics Concern    None     Social History Narrative    None     Family History   Problem Relation Age of Onset    Diabetes Mother     Hypertension Mother     High Cholesterol Mother     Stroke Mother     Cancer Mother        PHYSICAL EXAM    VITAL SIGNS: BP (!) 133/121   Pulse 66   Temp 98.3 °F (36.8 °C)   Resp 9   SpO2 100%    Constitutional:  Well developed, well nourished, no acute distress   HENT:  Atraumatic, moist mucus membranes  Neck: supple, no JVD   Respiratory:  Lungs clear to auscultation bilaterally, no retractions   Cardiovascular:  regular rate, no murmurs  Vascular: Radial and DP pulses 2+ and equal bilaterally  GI:  Soft, nontender, normal bowel sounds  Musculoskeletal:  no lower extremity edema, no lower extremity asymmetry, no calf tenderness, no thigh tenderness, no acute deformities  Integument:  Skin warm and dry, no petechiae   Neurologic:  Alert & oriented, no slurred speech  Psych: Pleasant affect, no hallucinations    EKG    Please see the ED Physician note for EKG interpretation. RADIOLOGY/PROCEDURES    XR CHEST PORTABLE   Final Result   No acute process. ED COURSE & MEDICAL DECISION MAKING    Pertinent Labs & Imaging studies reviewed and interpreted. (See chart for details)  The patient was immediately placed on the cardiac monitor. IV access obtained. ASA was given by EMS prior to arrival.    See chart for details of medications given during the ED stay.     Vitals:    11/29/18 1130 11/29/18 1215 11/29/18 1245 11/29/18 1300   BP: (!) 179/83 (!) 159/82 (!) 148/67 (!) 133/121   Pulse: 73 71 64 66   Resp: 16 13 18 9   Temp:       SpO2:           Differential Diagnosis: Acute Coronary Syndrome, Congestive Heart Failure, Thoracic Dissection, Pericarditis, Pericardial Effusion, Pulmonary Embolism, Pneumonia, Pneumothorax,

## 2018-11-29 NOTE — ED NOTES
Pt resting in room with family at bedside. No new orders at this time.       Ronaldo Newman RN  11/29/18 1100

## 2018-12-03 ENCOUNTER — TELEPHONE (OUTPATIENT)
Dept: PRIMARY CARE CLINIC | Age: 63
End: 2018-12-03

## 2018-12-03 NOTE — TELEPHONE ENCOUNTER
Called and spoke with pt. I advised pt that no changes in meds and continue as is and alivia is still working on scheduling [facility].

## 2018-12-04 ENCOUNTER — OFFICE VISIT (OUTPATIENT)
Dept: PAIN MANAGEMENT | Age: 63
End: 2018-12-04
Payer: COMMERCIAL

## 2018-12-04 VITALS
SYSTOLIC BLOOD PRESSURE: 158 MMHG | HEART RATE: 87 BPM | BODY MASS INDEX: 24.61 KG/M2 | WEIGHT: 126 LBS | DIASTOLIC BLOOD PRESSURE: 87 MMHG

## 2018-12-04 DIAGNOSIS — G89.4 CHRONIC PAIN SYNDROME: ICD-10-CM

## 2018-12-04 DIAGNOSIS — G43.119 INTRACTABLE MIGRAINE WITH AURA WITHOUT STATUS MIGRAINOSUS: ICD-10-CM

## 2018-12-04 DIAGNOSIS — M79.7 FIBROMYALGIA: ICD-10-CM

## 2018-12-04 DIAGNOSIS — E11.40 CHRONIC PAINFUL DIABETIC NEUROPATHY (HCC): ICD-10-CM

## 2018-12-04 PROCEDURE — G8598 ASA/ANTIPLAT THER USED: HCPCS | Performed by: INTERNAL MEDICINE

## 2018-12-04 PROCEDURE — 99213 OFFICE O/P EST LOW 20 MIN: CPT | Performed by: INTERNAL MEDICINE

## 2018-12-04 PROCEDURE — G8482 FLU IMMUNIZE ORDER/ADMIN: HCPCS | Performed by: INTERNAL MEDICINE

## 2018-12-04 PROCEDURE — 2022F DILAT RTA XM EVC RTNOPTHY: CPT | Performed by: INTERNAL MEDICINE

## 2018-12-04 PROCEDURE — 1036F TOBACCO NON-USER: CPT | Performed by: INTERNAL MEDICINE

## 2018-12-04 PROCEDURE — 3046F HEMOGLOBIN A1C LEVEL >9.0%: CPT | Performed by: INTERNAL MEDICINE

## 2018-12-04 PROCEDURE — 3017F COLORECTAL CA SCREEN DOC REV: CPT | Performed by: INTERNAL MEDICINE

## 2018-12-04 PROCEDURE — G8427 DOCREV CUR MEDS BY ELIG CLIN: HCPCS | Performed by: INTERNAL MEDICINE

## 2018-12-04 PROCEDURE — G8420 CALC BMI NORM PARAMETERS: HCPCS | Performed by: INTERNAL MEDICINE

## 2018-12-04 RX ORDER — DULOXETIN HYDROCHLORIDE 60 MG/1
60 CAPSULE, DELAYED RELEASE ORAL DAILY
Qty: 30 CAPSULE | Refills: 0 | Status: SHIPPED | OUTPATIENT
Start: 2018-12-04 | End: 2019-01-08 | Stop reason: SDUPTHER

## 2018-12-04 RX ORDER — DIVALPROEX SODIUM 250 MG/1
250 TABLET, DELAYED RELEASE ORAL 2 TIMES DAILY
Qty: 60 TABLET | Refills: 3 | Status: SHIPPED | OUTPATIENT
Start: 2018-12-04 | End: 2019-01-08

## 2018-12-04 RX ORDER — QUETIAPINE FUMARATE 50 MG/1
50 TABLET, FILM COATED ORAL NIGHTLY
Qty: 60 TABLET | Refills: 1 | Status: SHIPPED | OUTPATIENT
Start: 2018-12-04 | End: 2019-01-08

## 2018-12-04 RX ORDER — TOPIRAMATE 100 MG/1
100 TABLET, FILM COATED ORAL 2 TIMES DAILY
Qty: 60 TABLET | Refills: 1 | Status: SHIPPED | OUTPATIENT
Start: 2018-12-04 | End: 2019-01-08 | Stop reason: SDUPTHER

## 2018-12-04 RX ORDER — BUPRENORPHINE 5 UG/H
1 PATCH TRANSDERMAL WEEKLY
Qty: 4 PATCH | Refills: 0 | Status: SHIPPED | OUTPATIENT
Start: 2018-12-04 | End: 2019-01-01

## 2018-12-04 RX ORDER — BUPROPION HYDROCHLORIDE 150 MG/1
150 TABLET ORAL EVERY MORNING
Qty: 30 TABLET | Refills: 1 | Status: SHIPPED | OUTPATIENT
Start: 2018-12-04 | End: 2019-01-08 | Stop reason: SDUPTHER

## 2018-12-04 RX ORDER — OXYCODONE AND ACETAMINOPHEN 7.5; 325 MG/1; MG/1
1 TABLET ORAL EVERY 6 HOURS PRN
Qty: 100 TABLET | Refills: 0 | Status: SHIPPED | OUTPATIENT
Start: 2018-12-04 | End: 2019-01-08 | Stop reason: SDUPTHER

## 2018-12-04 NOTE — PROGRESS NOTES
succinate (TOPROL XL) 50 MG extended release tablet Take 1 tablet by mouth daily 30 tablet 3    Dulaglutide (TRULICITY) 1.5 LQ/5.9XC SOPN Inject 1.5 mg into the skin once a week 4 pen 3    insulin aspart (NOVOLOG FLEXPEN) 100 UNIT/ML injection pen Inject 5-10 Units into the skin 3 times daily (before meals) 5 pen 4    budesonide-formoterol (SYMBICORT) 160-4.5 MCG/ACT AERO Inhale 2 puffs into the lungs daily (Patient taking differently: Inhale 2 puffs into the lungs daily as needed ) 11 g 4    albuterol sulfate HFA (PROAIR HFA) 108 (90 Base) MCG/ACT inhaler Inhale 2 puffs into the lungs every 6 hours as needed for Wheezing 1 Inhaler 5    topiramate (TOPAMAX) 100 MG tablet Take 1 tablet by mouth 2 times daily 60 tablet 1    buPROPion (WELLBUTRIN XL) 150 MG extended release tablet Take 1 tablet by mouth every morning 30 tablet 1    DULoxetine (CYMBALTA) 60 MG extended release capsule Take 1 capsule by mouth daily 30 capsule 0    oxyCODONE-acetaminophen (PERCOCET) 7.5-325 MG per tablet Take 1 tablet by mouth every 6 hours as needed for Pain (max 3 per day) for up to 30 days. Winifred Zelaya Date: 11/6/18 84 tablet 0    buprenorphine (BUTRANS) 5 MCG/HR PTWK Place 1 patch onto the skin once a week for 28 days. . 4 patch 0    QUEtiapine (SEROQUEL) 50 MG tablet Take 1 tablet by mouth nightly 60 tablet 1    divalproex (DEPAKOTE) 250 MG DR tablet Take 1 tablet by mouth 2 times daily 60 tablet 3    chlorthalidone (HYGROTON) 25 MG tablet TAKE ONE TABLET BY MOUTH DAILY 90 tablet 0    nitroGLYCERIN (NITROSTAT) 0.3 MG SL tablet Place 1 tablet under the tongue every 5 minutes as needed for Chest pain 30 tablet 0    amLODIPine (NORVASC) 10 MG tablet Take 0.5 tablets by mouth daily 30 tablet 5    isosorbide mononitrate (IMDUR) 30 MG extended release tablet Take 1 tablet by mouth daily 30 tablet 5    torsemide (DEMADEX) 20 MG tablet Take 1 tablet by mouth daily 30 tablet 5    hydrALAZINE (APRESOLINE) 50 MG tablet Take 1 tablet by mouth 2 times daily 60 tablet 5    clopidogrel (PLAVIX) 75 MG tablet Take 1 tablet by mouth daily 90 tablet 5    ranolazine (RANEXA) 1000 MG extended release tablet Take 1 tablet by mouth 2 times daily 60 tablet 3    aspirin EC 81 MG EC tablet Take 1 tablet by mouth daily 90 tablet 3    ranitidine (ZANTAC) 150 MG tablet Take 1 tablet by mouth 2 times daily as needed for Heartburn (Patient taking differently: Take 150 mg by mouth 2 times daily ) 60 tablet 3    atorvastatin (LIPITOR) 80 MG tablet Take 1 tablet by mouth daily 90 tablet 3     No facility-administered medications prior to visit. SOCIAL/FAMILY/PAST MEDICAL HISTORY: Ms. Sana Bruno, family and past medical history was reviewed. REVIEW OF SYSTEMS:    Respiratory: Negative for apnea, chest tightness and shortness of breath or change in baseline breathing. Gastrointestinal: Negative for nausea, vomiting, abdominal pain, diarrhea, constipation, blood in stool and abdominal distention. PHYSICAL EXAM:   Nursing note and vitals reviewed. BP (!) 158/87   Pulse 87   Wt 126 lb (57.2 kg)   BMI 24.61 kg/m²   Constitutional: She appears well-developed and well-nourished. No acute distress. Skin: Skin is warm and dry, good turgor. No rash noted. She is not diaphoretic. Cardiovascular: Normal rate, regular rhythm, normal heart sounds, and does not have murmur. Pulmonary/Chest: Effort normal. No respiratory distress. She does not have wheezes in the lung fields. She has no rales. Neurological/Psychiatric:She is alert and oriented to person, place, and time. Coordination is  normal. Her mood isAppropriate and affect is Flat/blunted and Anxious . IMPRESSION:   1. Chronic pain syndrome    2. Intractable migraine with aura without status migrainosus    3. Fibromyalgia    4.  Chronic painful diabetic neuropathy ([facility]        PLAN:  Informed verbal consent was obtained  -Records from the hospital reviewed   -Cardiology notes

## 2018-12-05 ENCOUNTER — TELEPHONE (OUTPATIENT)
Dept: PRIMARY CARE CLINIC | Age: 63
End: 2018-12-05

## 2018-12-05 RX ORDER — ONDANSETRON 4 MG/1
4 TABLET, FILM COATED ORAL DAILY PRN
Qty: 30 TABLET | Refills: 0 | Status: SHIPPED | OUTPATIENT
Start: 2018-12-05 | End: 2019-02-05 | Stop reason: SDUPTHER

## 2018-12-06 ENCOUNTER — APPOINTMENT (OUTPATIENT)
Dept: GENERAL RADIOLOGY | Age: 63
End: 2018-12-06
Payer: COMMERCIAL

## 2018-12-06 ENCOUNTER — HOSPITAL ENCOUNTER (EMERGENCY)
Age: 63
Discharge: HOME | End: 2018-12-06
Attending: EMERGENCY MEDICINE
Payer: COMMERCIAL

## 2018-12-06 VITALS
RESPIRATION RATE: 15 BRPM | DIASTOLIC BLOOD PRESSURE: 78 MMHG | HEART RATE: 77 BPM | OXYGEN SATURATION: 99 % | TEMPERATURE: 98.4 F | SYSTOLIC BLOOD PRESSURE: 125 MMHG

## 2018-12-06 DIAGNOSIS — R07.9 ACUTE CHEST PAIN: Primary | ICD-10-CM

## 2018-12-06 LAB
A/G RATIO: 1.1 (ref 1.1–2.2)
ALBUMIN SERPL-MCNC: 3.7 G/DL (ref 3.4–5)
ALP BLD-CCNC: 103 U/L (ref 40–129)
ALT SERPL-CCNC: 14 U/L (ref 10–40)
ANION GAP SERPL CALCULATED.3IONS-SCNC: 17 MMOL/L (ref 3–16)
AST SERPL-CCNC: 17 U/L (ref 15–37)
BILIRUB SERPL-MCNC: <0.2 MG/DL (ref 0–1)
BUN BLDV-MCNC: 24 MG/DL (ref 7–20)
CALCIUM SERPL-MCNC: 9.2 MG/DL (ref 8.3–10.6)
CHLORIDE BLD-SCNC: 101 MMOL/L (ref 99–110)
CO2: 21 MMOL/L (ref 21–32)
CREAT SERPL-MCNC: 1.5 MG/DL (ref 0.6–1.2)
GFR AFRICAN AMERICAN: 43
GFR NON-AFRICAN AMERICAN: 35
GLOBULIN: 3.5 G/DL
GLUCOSE BLD-MCNC: 240 MG/DL (ref 70–99)
HCT VFR BLD CALC: 30.6 % (ref 36–48)
HEMOGLOBIN: 10.3 G/DL (ref 12–16)
MAGNESIUM: 1.9 MG/DL (ref 1.8–2.4)
MCH RBC QN AUTO: 33.7 PG (ref 26–34)
MCHC RBC AUTO-ENTMCNC: 33.7 G/DL (ref 31–36)
MCV RBC AUTO: 100 FL (ref 80–100)
PDW BLD-RTO: 14.9 % (ref 12.4–15.4)
PLATELET # BLD: 237 K/UL (ref 135–450)
PMV BLD AUTO: 8 FL (ref 5–10.5)
POTASSIUM REFLEX MAGNESIUM: 3.4 MMOL/L (ref 3.5–5.1)
RBC # BLD: 3.05 M/UL (ref 4–5.2)
SODIUM BLD-SCNC: 139 MMOL/L (ref 136–145)
TOTAL PROTEIN: 7.2 G/DL (ref 6.4–8.2)
TROPONIN: <0.01 NG/ML
WBC # BLD: 6 K/UL (ref 4–11)

## 2018-12-06 PROCEDURE — 85027 COMPLETE CBC AUTOMATED: CPT

## 2018-12-06 PROCEDURE — 84484 ASSAY OF TROPONIN QUANT: CPT

## 2018-12-06 PROCEDURE — 71046 X-RAY EXAM CHEST 2 VIEWS: CPT

## 2018-12-06 PROCEDURE — 96374 THER/PROPH/DIAG INJ IV PUSH: CPT

## 2018-12-06 PROCEDURE — 99285 EMERGENCY DEPT VISIT HI MDM: CPT

## 2018-12-06 PROCEDURE — 6370000000 HC RX 637 (ALT 250 FOR IP): Performed by: EMERGENCY MEDICINE

## 2018-12-06 PROCEDURE — 96375 TX/PRO/DX INJ NEW DRUG ADDON: CPT

## 2018-12-06 PROCEDURE — 83735 ASSAY OF MAGNESIUM: CPT

## 2018-12-06 PROCEDURE — 36415 COLL VENOUS BLD VENIPUNCTURE: CPT

## 2018-12-06 PROCEDURE — 93005 ELECTROCARDIOGRAM TRACING: CPT | Performed by: EMERGENCY MEDICINE

## 2018-12-06 PROCEDURE — 80053 COMPREHEN METABOLIC PANEL: CPT

## 2018-12-06 PROCEDURE — 6360000002 HC RX W HCPCS: Performed by: EMERGENCY MEDICINE

## 2018-12-06 RX ORDER — LORAZEPAM 1 MG/1
1 TABLET ORAL ONCE
Status: COMPLETED | OUTPATIENT
Start: 2018-12-06 | End: 2018-12-06

## 2018-12-06 RX ORDER — MORPHINE SULFATE 10 MG/ML
4 INJECTION, SOLUTION INTRAMUSCULAR; INTRAVENOUS ONCE
Status: COMPLETED | OUTPATIENT
Start: 2018-12-06 | End: 2018-12-06

## 2018-12-06 RX ORDER — ONDANSETRON 2 MG/ML
4 INJECTION INTRAMUSCULAR; INTRAVENOUS ONCE
Status: COMPLETED | OUTPATIENT
Start: 2018-12-06 | End: 2018-12-06

## 2018-12-06 RX ORDER — OXYCODONE HYDROCHLORIDE AND ACETAMINOPHEN 5; 325 MG/1; MG/1
1 TABLET ORAL ONCE
Status: DISCONTINUED | OUTPATIENT
Start: 2018-12-06 | End: 2018-12-06

## 2018-12-06 RX ORDER — ASPIRIN 325 MG
325 TABLET ORAL ONCE
Status: COMPLETED | OUTPATIENT
Start: 2018-12-06 | End: 2018-12-06

## 2018-12-06 RX ADMIN — MORPHINE SULFATE 4 MG: 10 INJECTION, SOLUTION INTRAMUSCULAR; INTRAVENOUS at 18:22

## 2018-12-06 RX ADMIN — ONDANSETRON 4 MG: 2 INJECTION INTRAMUSCULAR; INTRAVENOUS at 18:21

## 2018-12-06 RX ADMIN — LORAZEPAM 1 MG: 1 TABLET ORAL at 20:14

## 2018-12-06 RX ADMIN — ASPIRIN 325 MG ORAL TABLET 325 MG: 325 PILL ORAL at 18:21

## 2018-12-06 ASSESSMENT — PAIN DESCRIPTION - PAIN TYPE
TYPE: ACUTE PAIN
TYPE: ACUTE PAIN

## 2018-12-06 ASSESSMENT — ENCOUNTER SYMPTOMS
COUGH: 0
SHORTNESS OF BREATH: 0
NAUSEA: 1
VOMITING: 0
CHOKING: 0
TROUBLE SWALLOWING: 0
ABDOMINAL PAIN: 0
COLOR CHANGE: 0
CHEST TIGHTNESS: 0
PHOTOPHOBIA: 0

## 2018-12-06 ASSESSMENT — PAIN SCALES - GENERAL
PAINLEVEL_OUTOF10: 6
PAINLEVEL_OUTOF10: 8
PAINLEVEL_OUTOF10: 10

## 2018-12-06 ASSESSMENT — PAIN DESCRIPTION - LOCATION
LOCATION: CHEST
LOCATION: CHEST

## 2018-12-06 NOTE — ED PROVIDER NOTES
[id number] [facility]  [facility]      Pt Name: Jaden Carbone  MRN: [id number]  [place] 1955  Date ofevaluation: 12/6/2018  Provider: Alona Sow MD    CHIEF COMPLAINT       Chief Complaint   Patient presents with    Chest Pain     started around 1430. Took 3 NTG and 1 ASA at home with minimal relief. + Sweaty and + Nausea similar to MI in July         HISTORY OF PRESENT ILLNESS   (Location/Symptom, Timing/Onset,Context/Setting, Quality, Duration, Modifying Factors, Severity)  Note limiting factors. Jaden Carbone is a 59 y.o. female who presents to the emergency department Chief complaint of chest pain. Patient has a known history of angina and coronary artery disease. And is scheduled to have a catheterization after the holidays. Patient states that today around 14:00 she was sitting at home and had an onset of substernal chest pain with no radiation with no associated diaphoresis but denied associated nausea. Patient denies shortness of breath. Patient states that similar to chest pain she has had the past and she took aspirin without relief. Patient describes it as an aching chest pain. Patient states that is not the worst pain she has had. On presentation the patient appears to be in no acute distress who does answer questions appropriately. HPI    NursingNotes were reviewed. REVIEW OF SYSTEMS    (2-9 systems for level 4, 10 or more for level 5)     Review of Systems   Constitutional: Negative for activity change and fever. HENT: Negative for congestion and trouble swallowing. Eyes: Negative for photophobia and visual disturbance. Respiratory: Negative for cough, choking, chest tightness and shortness of breath. Cardiovascular: Positive for chest pain. Negative for palpitations and leg swelling. Gastrointestinal: Positive for nausea. Negative for abdominal pain and vomiting.    Genitourinary: Negative for difficulty urinating and chest pain. Reviewing the patient's medical record she has had multiple ED visits for chest pain and is scheduled for catheterization. On presentation the patient's vital signs are within normal limits and she appears to be no acute distress. Patient is not tachycardic or hypoxic. Differential diagnosis includes angina, ACS, pleurisy, pneumonia, bronchitis, chest wall pain, and pulmonary embolism. Given the patient's history of chronic chest pain and normal vital signs I am less concerned for pulmonary embolism or aortic dissection. REASSESSMENT      A repeat EKG was obtained which showed no changes compared to previous. Patient's workup was negative for emergent abnormalities. Patient's vital signs remained stable during her emergency department stay. I spoke with the patient's cardiologist who recommended outpatient follow-up for the patient. Results were discussed with the patient and why it was of the opinion that the patient was suitable for discharge. Patient is amenable to discharge home. Return indications discussed with the patient. Patient demonstrates understanding of when to return for reevaluation for persistent or worsening symptoms. CRITICAL CARE TIME     Total Critical Care time was 0 minutes, excluding separatelyreportable procedures. There was a high probability ofclinically significant/life threatening deterioration in the patient's condition which required my urgent intervention. CONSULTS:  None    PROCEDURES:  Unless otherwise noted below, none     Procedures    FINAL IMPRESSION      1.  Acute chest pain          DISPOSITION/PLAN   DISPOSITION        PATIENT REFERREDTO:  Josias Sal, [address] [facility]  [phone]    Schedule an appointment as soon as possible for a visit   As needed    Roma Gaspar MD  [address] [facility] [address] [address]  [phone]    Schedule an appointment as soon as possible for a visit   As

## 2018-12-07 ENCOUNTER — TELEPHONE (OUTPATIENT)
Dept: CARDIOLOGY CLINIC | Age: 63
End: 2018-12-07

## 2018-12-07 DIAGNOSIS — I25.10 CORONARY ARTERY DISEASE INVOLVING NATIVE CORONARY ARTERY OF NATIVE HEART WITHOUT ANGINA PECTORIS: ICD-10-CM

## 2018-12-07 DIAGNOSIS — G89.4 CHRONIC PAIN SYNDROME: ICD-10-CM

## 2018-12-07 DIAGNOSIS — G43.119 INTRACTABLE MIGRAINE WITH AURA WITHOUT STATUS MIGRAINOSUS: ICD-10-CM

## 2018-12-07 LAB
EKG ATRIAL RATE: 74 BPM
EKG ATRIAL RATE: 89 BPM
EKG DIAGNOSIS: NORMAL
EKG DIAGNOSIS: NORMAL
EKG P AXIS: 102 DEGREES
EKG P AXIS: 18 DEGREES
EKG P-R INTERVAL: 128 MS
EKG P-R INTERVAL: 96 MS
EKG Q-T INTERVAL: 362 MS
EKG Q-T INTERVAL: 400 MS
EKG QRS DURATION: 82 MS
EKG QRS DURATION: 84 MS
EKG QTC CALCULATION (BAZETT): 440 MS
EKG QTC CALCULATION (BAZETT): 444 MS
EKG R AXIS: 133 DEGREES
EKG R AXIS: 60 DEGREES
EKG T AXIS: 58 DEGREES
EKG T AXIS: 70 DEGREES
EKG VENTRICULAR RATE: 74 BPM
EKG VENTRICULAR RATE: 89 BPM

## 2018-12-07 PROCEDURE — 93010 ELECTROCARDIOGRAM REPORT: CPT | Performed by: INTERNAL MEDICINE

## 2018-12-07 RX ORDER — RANOLAZINE 1000 MG/1
1000 TABLET, FILM COATED, EXTENDED RELEASE ORAL 2 TIMES DAILY
Qty: 60 TABLET | Refills: 3 | Status: SHIPPED | OUTPATIENT
Start: 2018-12-07 | End: 2019-04-10 | Stop reason: SDUPTHER

## 2018-12-07 RX ORDER — ASPIRIN 81 MG/1
TABLET, DELAYED RELEASE ORAL
Qty: 30 TABLET | Refills: 3 | Status: SHIPPED | OUTPATIENT
Start: 2018-12-07 | End: 2019-04-10 | Stop reason: SDUPTHER

## 2018-12-07 RX ORDER — BUPROPION HYDROCHLORIDE 150 MG/1
150 TABLET ORAL EVERY MORNING
Qty: 30 TABLET | Refills: 1 | OUTPATIENT
Start: 2018-12-07

## 2018-12-07 RX ORDER — TOPIRAMATE 100 MG/1
100 TABLET, FILM COATED ORAL 2 TIMES DAILY
Qty: 60 TABLET | Refills: 1 | OUTPATIENT
Start: 2018-12-07

## 2018-12-12 ENCOUNTER — TELEPHONE (OUTPATIENT)
Dept: CARDIOLOGY CLINIC | Age: 63
End: 2018-12-12

## 2018-12-12 NOTE — TELEPHONE ENCOUNTER
Maren returned call and she is agreeable to proceeding next Thursday with [facility]. [facility]. Orders had already been placed.

## 2018-12-17 ENCOUNTER — PATIENT MESSAGE (OUTPATIENT)
Dept: CARDIOLOGY CLINIC | Age: 63
End: 2018-12-17

## 2018-12-19 NOTE — TELEPHONE ENCOUNTER
I called Maren this am to make sure she has read the [facility] messages I had sent to her. She had not. I have a new arrival time for you tomorrow.    You now need to arrive & report to the [facility] cath lab at [address] [place] [address] at discharge  She expressed understanding

## 2018-12-20 ENCOUNTER — HOSPITAL ENCOUNTER (OUTPATIENT)
Dept: CARDIAC CATH/INVASIVE PROCEDURES | Age: 63
Setting detail: OUTPATIENT SURGERY
Discharge: HOME | End: 2018-12-20
Payer: COMMERCIAL

## 2018-12-20 VITALS — HEIGHT: 60 IN | WEIGHT: 129.41 LBS | BODY MASS INDEX: 25.41 KG/M2

## 2018-12-20 DIAGNOSIS — I20.0 UNSTABLE ANGINA (HCC): ICD-10-CM

## 2018-12-20 LAB
ANION GAP SERPL CALCULATED.3IONS-SCNC: 13 MMOL/L (ref 3–16)
BUN BLDV-MCNC: 21 MG/DL (ref 7–20)
CALCIUM SERPL-MCNC: 9.3 MG/DL (ref 8.3–10.6)
CHLORIDE BLD-SCNC: 104 MMOL/L (ref 99–110)
CO2: 22 MMOL/L (ref 21–32)
CREAT SERPL-MCNC: 1.4 MG/DL (ref 0.6–1.2)
GFR AFRICAN AMERICAN: 46
GFR NON-AFRICAN AMERICAN: 38
GLUCOSE BLD-MCNC: 164 MG/DL (ref 70–99)
LEFT VENTRICULAR EJECTION FRACTION HIGH VALUE: 70 %
LEFT VENTRICULAR EJECTION FRACTION MODE: NORMAL
POTASSIUM SERPL-SCNC: 4 MMOL/L (ref 3.5–5.1)
SODIUM BLD-SCNC: 139 MMOL/L (ref 136–145)

## 2018-12-20 PROCEDURE — C1894 INTRO/SHEATH, NON-LASER: HCPCS

## 2018-12-20 PROCEDURE — C1769 GUIDE WIRE: HCPCS

## 2018-12-20 PROCEDURE — 93458 L HRT ARTERY/VENTRICLE ANGIO: CPT

## 2018-12-20 PROCEDURE — 80048 BASIC METABOLIC PNL TOTAL CA: CPT

## 2018-12-20 PROCEDURE — 6360000004 HC RX CONTRAST MEDICATION: Performed by: INTERNAL MEDICINE

## 2018-12-20 PROCEDURE — 99152 MOD SED SAME PHYS/QHP 5/>YRS: CPT

## 2018-12-20 PROCEDURE — 2500000003 HC RX 250 WO HCPCS

## 2018-12-20 PROCEDURE — 6360000002 HC RX W HCPCS

## 2018-12-20 PROCEDURE — 2709999900 HC NON-CHARGEABLE SUPPLY

## 2018-12-20 RX ORDER — ONDANSETRON 2 MG/ML
4 INJECTION INTRAMUSCULAR; INTRAVENOUS EVERY 6 HOURS PRN
Status: DISCONTINUED | OUTPATIENT
Start: 2018-12-20 | End: 2018-12-21 | Stop reason: HOSPADM

## 2018-12-20 RX ORDER — SODIUM CHLORIDE 0.9 % (FLUSH) 0.9 %
10 SYRINGE (ML) INJECTION PRN
Status: DISCONTINUED | OUTPATIENT
Start: 2018-12-20 | End: 2018-12-21 | Stop reason: HOSPADM

## 2018-12-20 RX ORDER — SODIUM CHLORIDE 9 MG/ML
INJECTION, SOLUTION INTRAVENOUS CONTINUOUS
Status: DISCONTINUED | OUTPATIENT
Start: 2018-12-20 | End: 2018-12-21 | Stop reason: HOSPADM

## 2018-12-20 RX ORDER — SODIUM CHLORIDE 9 MG/ML
INJECTION, SOLUTION INTRAVENOUS CONTINUOUS
Status: DISCONTINUED | OUTPATIENT
Start: 2018-12-20 | End: 2018-12-20 | Stop reason: ALTCHOICE

## 2018-12-20 RX ORDER — ACETAMINOPHEN 325 MG/1
650 TABLET ORAL EVERY 4 HOURS PRN
Status: DISCONTINUED | OUTPATIENT
Start: 2018-12-20 | End: 2018-12-21 | Stop reason: HOSPADM

## 2018-12-20 RX ORDER — SODIUM CHLORIDE 0.9 % (FLUSH) 0.9 %
10 SYRINGE (ML) INJECTION EVERY 12 HOURS SCHEDULED
Status: DISCONTINUED | OUTPATIENT
Start: 2018-12-20 | End: 2018-12-21 | Stop reason: HOSPADM

## 2018-12-20 RX ORDER — ASPIRIN 325 MG
325 TABLET ORAL ONCE
Status: DISCONTINUED | OUTPATIENT
Start: 2018-12-20 | End: 2018-12-20 | Stop reason: ALTCHOICE

## 2018-12-20 RX ORDER — SODIUM CHLORIDE 0.9 % (FLUSH) 0.9 %
10 SYRINGE (ML) INJECTION PRN
Status: DISCONTINUED | OUTPATIENT
Start: 2018-12-20 | End: 2018-12-20 | Stop reason: ALTCHOICE

## 2018-12-20 RX ORDER — SODIUM CHLORIDE 0.9 % (FLUSH) 0.9 %
10 SYRINGE (ML) INJECTION EVERY 12 HOURS SCHEDULED
Status: DISCONTINUED | OUTPATIENT
Start: 2018-12-20 | End: 2018-12-20 | Stop reason: ALTCHOICE

## 2018-12-20 RX ADMIN — IOPAMIDOL 72 ML: 755 INJECTION, SOLUTION INTRAVENOUS at 11:03

## 2018-12-20 NOTE — PROCEDURES
[address] [place] [address]                            CARDIAC CATHETERIZATION    PATIENT NAME: Jillian Barry                     :        1955  MED REC NO:   [id number]                          ROOM:  ACCOUNT NO:   [de-identified]                           ADMIT DATE: 2018  PROVIDER:     Cheryl Damon MD    DATE OF PROCEDURE:  2018    PROCEDURE PERFORMED:  Left heart catheterization. INDICATION FOR PROCEDURE:  Unstable angina. Informed consent obtained. PROCEDURE IN DETAIL:  The patient was brought to the cardiac cath  laboratory after informed consent was obtained. The right groin was  prepped and draped in sterile fashion. We gave lidocaine and accessed  the right common femoral artery. We inserted a 5-Estonian sheath then  dilators and wires were removed. Sheath was flushed. We inserted a JL4  diagnostic catheter and used it to select and engage the left main  coronary artery ostium. We performed angiography of the left system. JL was removed. Sanna Bake was advanced and used to select and engage the  right coronary artery ostium. We performed angiography of the right  system. Yvette Jerry was removed. Pigtail was advanced into the LV cavity. We  performed LV gram and LV hemodynamics. This was done after crossing the  aortic valve. Pigtail and sheaths were removed. Manual pressure was  applied to achieve hemostasis. No complication. Estimated blood loss  less than 20 mL. ANGIOGRAPHY FINDINGS:  1. Left main comes from the left coronary cusp. YAKELIN 3 flow. No  stenosis. 2.  Left anterior descending artery is patent. Distal left anterior  descending artery has a 70% stenosis. 3.  Left circumflex has patent stents. No significant stenosis. 4.  Right coronary artery has patent stents. No significant stenosis. 5.  LV ejection fraction 60%. SUMMARY:  Patent coronary artery stents.   Distal left

## 2018-12-20 NOTE — PRE SEDATION
mononitrate (IMDUR) 30 MG extended release tablet Take 1 tablet by mouth daily 30 tablet 5    torsemide (DEMADEX) 20 MG tablet Take 1 tablet by mouth daily 30 tablet 5    hydrALAZINE (APRESOLINE) 50 MG tablet Take 1 tablet by mouth 2 times daily 60 tablet 5    clopidogrel (PLAVIX) 75 MG tablet Take 1 tablet by mouth daily 90 tablet 5    ranitidine (ZANTAC) 150 MG tablet Take 1 tablet by mouth 2 times daily as needed for Heartburn (Patient taking differently: Take 150 mg by mouth 2 times daily ) 60 tablet 3    atorvastatin (LIPITOR) 80 MG tablet Take 1 tablet by mouth daily 90 tablet 3    buprenorphine (BUTRANS) 5 MCG/HR PTWK Place 1 patch onto the skin once a week for 28 days. . 4 patch 0    insulin glargine (LANTUS) 100 UNIT/ML injection vial Inject 50 Units into the skin nightly      Dulaglutide (TRULICITY) 1.5 IT/8.6CY SOPN Inject 1.5 mg into the skin once a week 4 pen 3    budesonide-formoterol (SYMBICORT) 160-4.5 MCG/ACT AERO Inhale 2 puffs into the lungs daily (Patient taking differently: Inhale 2 puffs into the lungs daily as needed ) 11 g 4    albuterol sulfate HFA (PROAIR HFA) 108 (90 Base) MCG/ACT inhaler Inhale 2 puffs into the lungs every 6 hours as needed for Wheezing 1 Inhaler 5    nitroGLYCERIN (NITROSTAT) 0.3 MG SL tablet Place 1 tablet under the tongue every 5 minutes as needed for Chest pain 30 tablet 0     Current Facility-Administered Medications   Medication Dose Route Frequency Provider Last Rate Last Dose    0.9 % sodium chloride infusion   Intravenous Continuous Thee Bob MD        sodium chloride flush 0.9 % injection 10 mL  10 mL Intravenous 2 times per day Thee Bob MD        sodium chloride flush 0.9 % injection 10 mL  10 mL Intravenous PRN Thee Bob MD        aspirin tablet 325 mg  325 mg Oral Once Thee Bob MD               Pre-Sedation:    Pre-Sedation Documentation and Exam:  I have personally completed a history, physical exam & review of systems for this

## 2019-01-05 ENCOUNTER — APPOINTMENT (OUTPATIENT)
Dept: GENERAL RADIOLOGY | Age: 64
End: 2019-01-05
Payer: COMMERCIAL

## 2019-01-05 ENCOUNTER — HOSPITAL ENCOUNTER (EMERGENCY)
Age: 64
Discharge: HOME | End: 2019-01-05
Attending: EMERGENCY MEDICINE
Payer: COMMERCIAL

## 2019-01-05 VITALS
RESPIRATION RATE: 13 BRPM | DIASTOLIC BLOOD PRESSURE: 59 MMHG | HEART RATE: 69 BPM | OXYGEN SATURATION: 99 % | BODY MASS INDEX: 25.84 KG/M2 | SYSTOLIC BLOOD PRESSURE: 155 MMHG | TEMPERATURE: 97.6 F | WEIGHT: 131.61 LBS | HEIGHT: 60 IN

## 2019-01-05 DIAGNOSIS — I25.119 CORONARY ARTERY DISEASE INVOLVING NATIVE CORONARY ARTERY OF NATIVE HEART WITH ANGINA PECTORIS (HCC): Primary | ICD-10-CM

## 2019-01-05 DIAGNOSIS — I25.9 CHEST PAIN DUE TO MYOCARDIAL ISCHEMIA, UNSPECIFIED ISCHEMIC CHEST PAIN TYPE: ICD-10-CM

## 2019-01-05 LAB
A/G RATIO: 1.1 (ref 1.1–2.2)
ALBUMIN SERPL-MCNC: 3.8 G/DL (ref 3.4–5)
ALP BLD-CCNC: 99 U/L (ref 40–129)
ALT SERPL-CCNC: 11 U/L (ref 10–40)
ANION GAP SERPL CALCULATED.3IONS-SCNC: 12 MMOL/L (ref 3–16)
AST SERPL-CCNC: 16 U/L (ref 15–37)
BASOPHILS ABSOLUTE: 0 K/UL (ref 0–0.2)
BASOPHILS RELATIVE PERCENT: 0.6 %
BILIRUB SERPL-MCNC: 0.3 MG/DL (ref 0–1)
BUN BLDV-MCNC: 19 MG/DL (ref 7–20)
CALCIUM SERPL-MCNC: 9 MG/DL (ref 8.3–10.6)
CHLORIDE BLD-SCNC: 107 MMOL/L (ref 99–110)
CO2: 21 MMOL/L (ref 21–32)
CREAT SERPL-MCNC: 1.2 MG/DL (ref 0.6–1.2)
EKG ATRIAL RATE: 70 BPM
EKG DIAGNOSIS: NORMAL
EKG P AXIS: 12 DEGREES
EKG P-R INTERVAL: 110 MS
EKG Q-T INTERVAL: 372 MS
EKG QRS DURATION: 84 MS
EKG QTC CALCULATION (BAZETT): 401 MS
EKG R AXIS: 53 DEGREES
EKG T AXIS: 138 DEGREES
EKG VENTRICULAR RATE: 70 BPM
EOSINOPHILS ABSOLUTE: 0.2 K/UL (ref 0–0.6)
EOSINOPHILS RELATIVE PERCENT: 4.1 %
GFR AFRICAN AMERICAN: 55
GFR NON-AFRICAN AMERICAN: 45
GLOBULIN: 3.5 G/DL
GLUCOSE BLD-MCNC: 200 MG/DL (ref 70–99)
HCT VFR BLD CALC: 34.5 % (ref 36–48)
HEMOGLOBIN: 11.3 G/DL (ref 12–16)
LIPASE: 14 U/L (ref 13–60)
LYMPHOCYTES ABSOLUTE: 1.2 K/UL (ref 1–5.1)
LYMPHOCYTES RELATIVE PERCENT: 25 %
MCH RBC QN AUTO: 33.6 PG (ref 26–34)
MCHC RBC AUTO-ENTMCNC: 32.7 G/DL (ref 31–36)
MCV RBC AUTO: 102.8 FL (ref 80–100)
MONOCYTES ABSOLUTE: 0.3 K/UL (ref 0–1.3)
MONOCYTES RELATIVE PERCENT: 5.9 %
NEUTROPHILS ABSOLUTE: 3.2 K/UL (ref 1.7–7.7)
NEUTROPHILS RELATIVE PERCENT: 64.4 %
PDW BLD-RTO: 14.5 % (ref 12.4–15.4)
PLATELET # BLD: 256 K/UL (ref 135–450)
PMV BLD AUTO: 7.8 FL (ref 5–10.5)
POTASSIUM REFLEX MAGNESIUM: 4.2 MMOL/L (ref 3.5–5.1)
PRO-BNP: 219 PG/ML (ref 0–124)
RBC # BLD: 3.36 M/UL (ref 4–5.2)
SODIUM BLD-SCNC: 140 MMOL/L (ref 136–145)
TOTAL PROTEIN: 7.3 G/DL (ref 6.4–8.2)
TROPONIN: <0.01 NG/ML
TROPONIN: <0.01 NG/ML
WBC # BLD: 4.9 K/UL (ref 4–11)

## 2019-01-05 PROCEDURE — 93005 ELECTROCARDIOGRAM TRACING: CPT | Performed by: NURSE PRACTITIONER

## 2019-01-05 PROCEDURE — 84484 ASSAY OF TROPONIN QUANT: CPT

## 2019-01-05 PROCEDURE — 71046 X-RAY EXAM CHEST 2 VIEWS: CPT

## 2019-01-05 PROCEDURE — 6360000002 HC RX W HCPCS: Performed by: NURSE PRACTITIONER

## 2019-01-05 PROCEDURE — 6370000000 HC RX 637 (ALT 250 FOR IP): Performed by: EMERGENCY MEDICINE

## 2019-01-05 PROCEDURE — 93010 ELECTROCARDIOGRAM REPORT: CPT | Performed by: INTERNAL MEDICINE

## 2019-01-05 PROCEDURE — 96375 TX/PRO/DX INJ NEW DRUG ADDON: CPT

## 2019-01-05 PROCEDURE — 80053 COMPREHEN METABOLIC PANEL: CPT

## 2019-01-05 PROCEDURE — 96374 THER/PROPH/DIAG INJ IV PUSH: CPT

## 2019-01-05 PROCEDURE — 83690 ASSAY OF LIPASE: CPT

## 2019-01-05 PROCEDURE — 85025 COMPLETE CBC W/AUTO DIFF WBC: CPT

## 2019-01-05 PROCEDURE — 36415 COLL VENOUS BLD VENIPUNCTURE: CPT

## 2019-01-05 PROCEDURE — 99285 EMERGENCY DEPT VISIT HI MDM: CPT

## 2019-01-05 PROCEDURE — 6370000000 HC RX 637 (ALT 250 FOR IP): Performed by: NURSE PRACTITIONER

## 2019-01-05 PROCEDURE — 96376 TX/PRO/DX INJ SAME DRUG ADON: CPT

## 2019-01-05 PROCEDURE — 83880 ASSAY OF NATRIURETIC PEPTIDE: CPT

## 2019-01-05 RX ORDER — ONDANSETRON 2 MG/ML
4 INJECTION INTRAMUSCULAR; INTRAVENOUS ONCE
Status: COMPLETED | OUTPATIENT
Start: 2019-01-05 | End: 2019-01-05

## 2019-01-05 RX ORDER — OXYCODONE HYDROCHLORIDE AND ACETAMINOPHEN 5; 325 MG/1; MG/1
2 TABLET ORAL ONCE
Status: COMPLETED | OUTPATIENT
Start: 2019-01-05 | End: 2019-01-05

## 2019-01-05 RX ORDER — ASPIRIN 81 MG/1
243 TABLET, CHEWABLE ORAL DAILY
Status: DISCONTINUED | OUTPATIENT
Start: 2019-01-06 | End: 2019-01-05 | Stop reason: HOSPADM

## 2019-01-05 RX ORDER — MORPHINE SULFATE 2 MG/ML
4 INJECTION, SOLUTION INTRAMUSCULAR; INTRAVENOUS ONCE
Status: COMPLETED | OUTPATIENT
Start: 2019-01-05 | End: 2019-01-05

## 2019-01-05 RX ORDER — MORPHINE SULFATE 2 MG/ML
2 INJECTION, SOLUTION INTRAMUSCULAR; INTRAVENOUS ONCE
Status: COMPLETED | OUTPATIENT
Start: 2019-01-05 | End: 2019-01-05

## 2019-01-05 RX ADMIN — NITROGLYCERIN 0.5 INCH: 20 OINTMENT TOPICAL at 12:11

## 2019-01-05 RX ADMIN — MORPHINE SULFATE 4 MG: 2 INJECTION, SOLUTION INTRAMUSCULAR; INTRAVENOUS at 13:58

## 2019-01-05 RX ADMIN — ONDANSETRON 4 MG: 2 INJECTION INTRAMUSCULAR; INTRAVENOUS at 14:03

## 2019-01-05 RX ADMIN — MORPHINE SULFATE 2 MG: 2 INJECTION, SOLUTION INTRAMUSCULAR; INTRAVENOUS at 12:11

## 2019-01-05 RX ADMIN — OXYCODONE AND ACETAMINOPHEN 2 TABLET: 5; 325 TABLET ORAL at 14:49

## 2019-01-05 RX ADMIN — ONDANSETRON 4 MG: 2 INJECTION INTRAMUSCULAR; INTRAVENOUS at 12:11

## 2019-01-05 ASSESSMENT — PAIN DESCRIPTION - PAIN TYPE
TYPE: ACUTE PAIN;CHRONIC PAIN
TYPE: ACUTE PAIN

## 2019-01-05 ASSESSMENT — PAIN SCALES - GENERAL
PAINLEVEL_OUTOF10: 10
PAINLEVEL_OUTOF10: 8
PAINLEVEL_OUTOF10: 10
PAINLEVEL_OUTOF10: 10
PAINLEVEL_OUTOF10: 8
PAINLEVEL_OUTOF10: 8

## 2019-01-05 ASSESSMENT — ENCOUNTER SYMPTOMS
EYES NEGATIVE: 1
CHEST TIGHTNESS: 0
BACK PAIN: 0
SHORTNESS OF BREATH: 0
COUGH: 0
GASTROINTESTINAL NEGATIVE: 1

## 2019-01-05 ASSESSMENT — HEART SCORE: ECG: 0

## 2019-01-05 ASSESSMENT — PAIN DESCRIPTION - DESCRIPTORS: DESCRIPTORS: SQUEEZING

## 2019-01-05 ASSESSMENT — PAIN DESCRIPTION - LOCATION
LOCATION: CHEST
LOCATION: CHEST

## 2019-01-08 ENCOUNTER — OFFICE VISIT (OUTPATIENT)
Dept: PAIN MANAGEMENT | Age: 64
End: 2019-01-08
Payer: COMMERCIAL

## 2019-01-08 ENCOUNTER — TELEPHONE (OUTPATIENT)
Dept: PRIMARY CARE CLINIC | Age: 64
End: 2019-01-08

## 2019-01-08 VITALS
WEIGHT: 131.6 LBS | BODY MASS INDEX: 25.7 KG/M2 | HEART RATE: 80 BPM | DIASTOLIC BLOOD PRESSURE: 81 MMHG | SYSTOLIC BLOOD PRESSURE: 167 MMHG

## 2019-01-08 DIAGNOSIS — M79.7 FIBROMYALGIA: ICD-10-CM

## 2019-01-08 DIAGNOSIS — E11.40 CHRONIC PAINFUL DIABETIC NEUROPATHY (HCC): ICD-10-CM

## 2019-01-08 DIAGNOSIS — G89.4 CHRONIC PAIN SYNDROME: ICD-10-CM

## 2019-01-08 PROCEDURE — G8427 DOCREV CUR MEDS BY ELIG CLIN: HCPCS | Performed by: INTERNAL MEDICINE

## 2019-01-08 PROCEDURE — 2022F DILAT RTA XM EVC RTNOPTHY: CPT | Performed by: INTERNAL MEDICINE

## 2019-01-08 PROCEDURE — 3017F COLORECTAL CA SCREEN DOC REV: CPT | Performed by: INTERNAL MEDICINE

## 2019-01-08 PROCEDURE — 99213 OFFICE O/P EST LOW 20 MIN: CPT | Performed by: INTERNAL MEDICINE

## 2019-01-08 PROCEDURE — 1036F TOBACCO NON-USER: CPT | Performed by: INTERNAL MEDICINE

## 2019-01-08 PROCEDURE — G8419 CALC BMI OUT NRM PARAM NOF/U: HCPCS | Performed by: INTERNAL MEDICINE

## 2019-01-08 PROCEDURE — G8598 ASA/ANTIPLAT THER USED: HCPCS | Performed by: INTERNAL MEDICINE

## 2019-01-08 PROCEDURE — G8482 FLU IMMUNIZE ORDER/ADMIN: HCPCS | Performed by: INTERNAL MEDICINE

## 2019-01-08 PROCEDURE — 3046F HEMOGLOBIN A1C LEVEL >9.0%: CPT | Performed by: INTERNAL MEDICINE

## 2019-01-08 RX ORDER — TOPIRAMATE 100 MG/1
100 TABLET, FILM COATED ORAL 2 TIMES DAILY
Qty: 60 TABLET | Refills: 1 | Status: SHIPPED | OUTPATIENT
Start: 2019-01-08 | End: 2019-02-12 | Stop reason: SDUPTHER

## 2019-01-08 RX ORDER — OXYCODONE AND ACETAMINOPHEN 7.5; 325 MG/1; MG/1
1 TABLET ORAL EVERY 6 HOURS PRN
Qty: 100 TABLET | Refills: 0 | Status: SHIPPED | OUTPATIENT
Start: 2019-01-08 | End: 2019-02-12 | Stop reason: SDUPTHER

## 2019-01-08 RX ORDER — BUPROPION HYDROCHLORIDE 150 MG/1
150 TABLET ORAL EVERY MORNING
Qty: 30 TABLET | Refills: 1 | Status: SHIPPED | OUTPATIENT
Start: 2019-01-08 | End: 2019-02-12 | Stop reason: SDUPTHER

## 2019-01-08 RX ORDER — DULOXETIN HYDROCHLORIDE 60 MG/1
60 CAPSULE, DELAYED RELEASE ORAL DAILY
Qty: 30 CAPSULE | Refills: 0 | Status: SHIPPED | OUTPATIENT
Start: 2019-01-08 | End: 2019-02-12 | Stop reason: SDUPTHER

## 2019-01-10 ENCOUNTER — OFFICE VISIT (OUTPATIENT)
Dept: CARDIOLOGY CLINIC | Age: 64
End: 2019-01-10
Payer: COMMERCIAL

## 2019-01-10 VITALS
HEART RATE: 80 BPM | DIASTOLIC BLOOD PRESSURE: 80 MMHG | HEIGHT: 60 IN | SYSTOLIC BLOOD PRESSURE: 116 MMHG | BODY MASS INDEX: 25.72 KG/M2 | WEIGHT: 131 LBS | OXYGEN SATURATION: 99 %

## 2019-01-10 DIAGNOSIS — R07.89 SQUEEZING CHEST PAIN: ICD-10-CM

## 2019-01-10 DIAGNOSIS — Z87.891 FORMER SMOKER: ICD-10-CM

## 2019-01-10 DIAGNOSIS — I25.119 CORONARY ARTERY DISEASE INVOLVING NATIVE CORONARY ARTERY OF NATIVE HEART WITH ANGINA PECTORIS (HCC): Primary | ICD-10-CM

## 2019-01-10 DIAGNOSIS — J44.9 CHRONIC OBSTRUCTIVE PULMONARY DISEASE, UNSPECIFIED COPD TYPE (HCC): ICD-10-CM

## 2019-01-10 DIAGNOSIS — R06.09 DOE (DYSPNEA ON EXERTION): ICD-10-CM

## 2019-01-10 PROCEDURE — G8427 DOCREV CUR MEDS BY ELIG CLIN: HCPCS | Performed by: INTERNAL MEDICINE

## 2019-01-10 PROCEDURE — 3023F SPIROM DOC REV: CPT | Performed by: INTERNAL MEDICINE

## 2019-01-10 PROCEDURE — G8419 CALC BMI OUT NRM PARAM NOF/U: HCPCS | Performed by: INTERNAL MEDICINE

## 2019-01-10 PROCEDURE — 3017F COLORECTAL CA SCREEN DOC REV: CPT | Performed by: INTERNAL MEDICINE

## 2019-01-10 PROCEDURE — 93000 ELECTROCARDIOGRAM COMPLETE: CPT | Performed by: INTERNAL MEDICINE

## 2019-01-10 PROCEDURE — G8926 SPIRO NO PERF OR DOC: HCPCS | Performed by: INTERNAL MEDICINE

## 2019-01-10 PROCEDURE — G8598 ASA/ANTIPLAT THER USED: HCPCS | Performed by: INTERNAL MEDICINE

## 2019-01-10 PROCEDURE — G8482 FLU IMMUNIZE ORDER/ADMIN: HCPCS | Performed by: INTERNAL MEDICINE

## 2019-01-10 PROCEDURE — 99215 OFFICE O/P EST HI 40 MIN: CPT | Performed by: INTERNAL MEDICINE

## 2019-01-10 PROCEDURE — 1036F TOBACCO NON-USER: CPT | Performed by: INTERNAL MEDICINE

## 2019-01-10 RX ORDER — NITROGLYCERIN 0.4 MG/1
TABLET SUBLINGUAL
Qty: 25 TABLET | Refills: 2 | Status: SHIPPED | OUTPATIENT
Start: 2019-01-10 | End: 2019-08-29 | Stop reason: SDUPTHER

## 2019-01-10 RX ORDER — PEN NEEDLE, DIABETIC 29 G X1/2"
NEEDLE, DISPOSABLE MISCELLANEOUS
Qty: 100 EACH | Refills: 5 | Status: ON HOLD | OUTPATIENT
Start: 2019-01-10 | End: 2019-07-28

## 2019-01-11 ENCOUNTER — OFFICE VISIT (OUTPATIENT)
Dept: PULMONOLOGY | Age: 64
End: 2019-01-11
Payer: COMMERCIAL

## 2019-01-11 VITALS
HEART RATE: 83 BPM | TEMPERATURE: 97.5 F | SYSTOLIC BLOOD PRESSURE: 163 MMHG | BODY MASS INDEX: 25.13 KG/M2 | WEIGHT: 128 LBS | OXYGEN SATURATION: 100 % | HEIGHT: 60 IN | RESPIRATION RATE: 20 BRPM | DIASTOLIC BLOOD PRESSURE: 79 MMHG

## 2019-01-11 DIAGNOSIS — Z87.891 PERSONAL HISTORY OF NICOTINE DEPENDENCE: ICD-10-CM

## 2019-01-11 DIAGNOSIS — Z72.0 TOBACCO USE: ICD-10-CM

## 2019-01-11 DIAGNOSIS — R06.09 DYSPNEA ON EXERTION: ICD-10-CM

## 2019-01-11 DIAGNOSIS — J44.9 CHRONIC OBSTRUCTIVE PULMONARY DISEASE, UNSPECIFIED COPD TYPE (HCC): Primary | ICD-10-CM

## 2019-01-11 PROCEDURE — 99204 OFFICE O/P NEW MOD 45 MIN: CPT | Performed by: INTERNAL MEDICINE

## 2019-01-11 PROCEDURE — G8419 CALC BMI OUT NRM PARAM NOF/U: HCPCS | Performed by: INTERNAL MEDICINE

## 2019-01-11 PROCEDURE — 3023F SPIROM DOC REV: CPT | Performed by: INTERNAL MEDICINE

## 2019-01-11 PROCEDURE — G8926 SPIRO NO PERF OR DOC: HCPCS | Performed by: INTERNAL MEDICINE

## 2019-01-11 PROCEDURE — G8427 DOCREV CUR MEDS BY ELIG CLIN: HCPCS | Performed by: INTERNAL MEDICINE

## 2019-01-11 PROCEDURE — 3017F COLORECTAL CA SCREEN DOC REV: CPT | Performed by: INTERNAL MEDICINE

## 2019-01-11 PROCEDURE — G8482 FLU IMMUNIZE ORDER/ADMIN: HCPCS | Performed by: INTERNAL MEDICINE

## 2019-01-15 RX ORDER — FLASH GLUCOSE SENSOR
KIT MISCELLANEOUS
Qty: 1 EACH | Refills: 5 | Status: SHIPPED | OUTPATIENT
Start: 2019-01-15 | End: 2019-02-05 | Stop reason: SDUPTHER

## 2019-01-15 RX ORDER — FLASH GLUCOSE SCANNING READER
EACH MISCELLANEOUS
Qty: 1 DEVICE | Refills: 0 | Status: SHIPPED | OUTPATIENT
Start: 2019-01-15 | End: 2019-02-05 | Stop reason: SDUPTHER

## 2019-01-17 ENCOUNTER — TELEPHONE (OUTPATIENT)
Dept: PRIMARY CARE CLINIC | Age: 64
End: 2019-01-17

## 2019-01-28 ENCOUNTER — TELEPHONE (OUTPATIENT)
Dept: PRIMARY CARE CLINIC | Age: 64
End: 2019-01-28

## 2019-02-05 ENCOUNTER — TELEPHONE (OUTPATIENT)
Dept: PRIMARY CARE CLINIC | Age: 64
End: 2019-02-05

## 2019-02-05 ENCOUNTER — OFFICE VISIT (OUTPATIENT)
Dept: PRIMARY CARE CLINIC | Age: 64
End: 2019-02-05
Payer: COMMERCIAL

## 2019-02-05 VITALS
DIASTOLIC BLOOD PRESSURE: 80 MMHG | HEIGHT: 60 IN | BODY MASS INDEX: 24.74 KG/M2 | SYSTOLIC BLOOD PRESSURE: 125 MMHG | TEMPERATURE: 97.5 F | WEIGHT: 126 LBS

## 2019-02-05 DIAGNOSIS — I25.10 CORONARY ARTERY DISEASE INVOLVING NATIVE CORONARY ARTERY OF NATIVE HEART WITHOUT ANGINA PECTORIS: ICD-10-CM

## 2019-02-05 DIAGNOSIS — R52 PAIN DISORDER: ICD-10-CM

## 2019-02-05 DIAGNOSIS — I10 ESSENTIAL HYPERTENSION: ICD-10-CM

## 2019-02-05 DIAGNOSIS — E78.49 OTHER HYPERLIPIDEMIA: ICD-10-CM

## 2019-02-05 DIAGNOSIS — R11.0 NAUSEA: ICD-10-CM

## 2019-02-05 LAB — HBA1C MFR BLD: 8.9 %

## 2019-02-05 PROCEDURE — G8482 FLU IMMUNIZE ORDER/ADMIN: HCPCS | Performed by: INTERNAL MEDICINE

## 2019-02-05 PROCEDURE — G8420 CALC BMI NORM PARAMETERS: HCPCS | Performed by: INTERNAL MEDICINE

## 2019-02-05 PROCEDURE — 83036 HEMOGLOBIN GLYCOSYLATED A1C: CPT | Performed by: INTERNAL MEDICINE

## 2019-02-05 PROCEDURE — 99214 OFFICE O/P EST MOD 30 MIN: CPT | Performed by: INTERNAL MEDICINE

## 2019-02-05 PROCEDURE — G8598 ASA/ANTIPLAT THER USED: HCPCS | Performed by: INTERNAL MEDICINE

## 2019-02-05 PROCEDURE — 3045F PR MOST RECENT HEMOGLOBIN A1C LEVEL 7.0-9.0%: CPT | Performed by: INTERNAL MEDICINE

## 2019-02-05 PROCEDURE — 1036F TOBACCO NON-USER: CPT | Performed by: INTERNAL MEDICINE

## 2019-02-05 PROCEDURE — 3017F COLORECTAL CA SCREEN DOC REV: CPT | Performed by: INTERNAL MEDICINE

## 2019-02-05 PROCEDURE — G8427 DOCREV CUR MEDS BY ELIG CLIN: HCPCS | Performed by: INTERNAL MEDICINE

## 2019-02-05 PROCEDURE — 2022F DILAT RTA XM EVC RTNOPTHY: CPT | Performed by: INTERNAL MEDICINE

## 2019-02-05 RX ORDER — FLASH GLUCOSE SCANNING READER
EACH MISCELLANEOUS
Qty: 1 DEVICE | Refills: 0 | Status: ON HOLD | OUTPATIENT
Start: 2019-02-05 | End: 2019-07-28

## 2019-02-05 RX ORDER — ONDANSETRON HYDROCHLORIDE 8 MG/1
8 TABLET, FILM COATED ORAL EVERY 8 HOURS PRN
Qty: 90 TABLET | Refills: 1 | Status: SHIPPED | OUTPATIENT
Start: 2019-02-05 | End: 2019-04-22 | Stop reason: SDUPTHER

## 2019-02-05 RX ORDER — INSULIN GLARGINE 100 [IU]/ML
50 INJECTION, SOLUTION SUBCUTANEOUS 2 TIMES DAILY
Qty: 9 VIAL | Refills: 5 | Status: SHIPPED | OUTPATIENT
Start: 2019-02-05 | End: 2019-02-05 | Stop reason: SDUPTHER

## 2019-02-05 RX ORDER — ROSUVASTATIN CALCIUM 40 MG/1
40 TABLET, COATED ORAL NIGHTLY
Qty: 90 TABLET | Refills: 5 | Status: ON HOLD | OUTPATIENT
Start: 2019-02-05 | End: 2019-07-28

## 2019-02-05 RX ORDER — INSULIN GLARGINE 100 [IU]/ML
50 INJECTION, SOLUTION SUBCUTANEOUS 2 TIMES DAILY
Qty: 9 VIAL | Refills: 5 | Status: SHIPPED | OUTPATIENT
Start: 2019-02-05 | End: 2019-02-14 | Stop reason: SDUPTHER

## 2019-02-05 RX ORDER — FLASH GLUCOSE SENSOR
KIT MISCELLANEOUS
Qty: 1 EACH | Refills: 5 | Status: ON HOLD | OUTPATIENT
Start: 2019-02-05 | End: 2019-07-28

## 2019-02-05 ASSESSMENT — ENCOUNTER SYMPTOMS
VOMITING: 1
ABDOMINAL DISTENTION: 0
SHORTNESS OF BREATH: 1
EYES NEGATIVE: 1
NAUSEA: 1
BACK PAIN: 1
CHEST TIGHTNESS: 0
WHEEZING: 0
ABDOMINAL PAIN: 0
COUGH: 1

## 2019-02-07 DIAGNOSIS — G47.09 OTHER INSOMNIA: Primary | ICD-10-CM

## 2019-02-07 RX ORDER — ZOLPIDEM TARTRATE 5 MG/1
5 TABLET ORAL NIGHTLY PRN
Qty: 30 TABLET | Refills: 1 | Status: SHIPPED | OUTPATIENT
Start: 2019-02-07 | End: 2019-03-09

## 2019-02-11 ENCOUNTER — TELEPHONE (OUTPATIENT)
Dept: PULMONOLOGY | Age: 64
End: 2019-02-11

## 2019-02-12 ENCOUNTER — OFFICE VISIT (OUTPATIENT)
Dept: PAIN MANAGEMENT | Age: 64
End: 2019-02-12
Payer: COMMERCIAL

## 2019-02-12 VITALS
WEIGHT: 130 LBS | HEART RATE: 91 BPM | SYSTOLIC BLOOD PRESSURE: 137 MMHG | DIASTOLIC BLOOD PRESSURE: 81 MMHG | BODY MASS INDEX: 25.39 KG/M2

## 2019-02-12 DIAGNOSIS — E11.40 CHRONIC PAINFUL DIABETIC NEUROPATHY (HCC): ICD-10-CM

## 2019-02-12 DIAGNOSIS — G43.711 HEADACHE, CHRONIC MIGRAINE WITHOUT AURA, INTRACTABLE, WITH STATUS: ICD-10-CM

## 2019-02-12 DIAGNOSIS — M79.7 FIBROMYALGIA: ICD-10-CM

## 2019-02-12 DIAGNOSIS — G89.4 CHRONIC PAIN SYNDROME: ICD-10-CM

## 2019-02-12 PROCEDURE — 99213 OFFICE O/P EST LOW 20 MIN: CPT | Performed by: INTERNAL MEDICINE

## 2019-02-12 PROCEDURE — 3045F PR MOST RECENT HEMOGLOBIN A1C LEVEL 7.0-9.0%: CPT | Performed by: INTERNAL MEDICINE

## 2019-02-12 PROCEDURE — G8419 CALC BMI OUT NRM PARAM NOF/U: HCPCS | Performed by: INTERNAL MEDICINE

## 2019-02-12 PROCEDURE — 2022F DILAT RTA XM EVC RTNOPTHY: CPT | Performed by: INTERNAL MEDICINE

## 2019-02-12 PROCEDURE — G8598 ASA/ANTIPLAT THER USED: HCPCS | Performed by: INTERNAL MEDICINE

## 2019-02-12 PROCEDURE — 1036F TOBACCO NON-USER: CPT | Performed by: INTERNAL MEDICINE

## 2019-02-12 PROCEDURE — G8427 DOCREV CUR MEDS BY ELIG CLIN: HCPCS | Performed by: INTERNAL MEDICINE

## 2019-02-12 PROCEDURE — 3017F COLORECTAL CA SCREEN DOC REV: CPT | Performed by: INTERNAL MEDICINE

## 2019-02-12 PROCEDURE — G8482 FLU IMMUNIZE ORDER/ADMIN: HCPCS | Performed by: INTERNAL MEDICINE

## 2019-02-12 RX ORDER — BUPROPION HYDROCHLORIDE 150 MG/1
150 TABLET ORAL EVERY MORNING
Qty: 30 TABLET | Refills: 1 | Status: SHIPPED | OUTPATIENT
Start: 2019-02-12 | End: 2019-03-12 | Stop reason: SDUPTHER

## 2019-02-12 RX ORDER — TOPIRAMATE 100 MG/1
100 TABLET, FILM COATED ORAL 2 TIMES DAILY
Qty: 60 TABLET | Refills: 1 | Status: SHIPPED | OUTPATIENT
Start: 2019-02-12 | End: 2019-03-12 | Stop reason: SDUPTHER

## 2019-02-12 RX ORDER — OXYCODONE AND ACETAMINOPHEN 7.5; 325 MG/1; MG/1
1 TABLET ORAL EVERY 6 HOURS PRN
Qty: 100 TABLET | Refills: 0 | Status: SHIPPED | OUTPATIENT
Start: 2019-02-12 | End: 2019-03-12 | Stop reason: SDUPTHER

## 2019-02-12 RX ORDER — DULOXETIN HYDROCHLORIDE 60 MG/1
60 CAPSULE, DELAYED RELEASE ORAL DAILY
Qty: 30 CAPSULE | Refills: 0 | Status: SHIPPED | OUTPATIENT
Start: 2019-02-12 | End: 2019-03-12 | Stop reason: SDUPTHER

## 2019-02-14 ENCOUNTER — OFFICE VISIT (OUTPATIENT)
Dept: PULMONOLOGY | Age: 64
End: 2019-02-14
Payer: COMMERCIAL

## 2019-02-14 VITALS
BODY MASS INDEX: 24.74 KG/M2 | WEIGHT: 126 LBS | RESPIRATION RATE: 16 BRPM | TEMPERATURE: 97.9 F | SYSTOLIC BLOOD PRESSURE: 126 MMHG | DIASTOLIC BLOOD PRESSURE: 84 MMHG | HEART RATE: 73 BPM | OXYGEN SATURATION: 99 % | HEIGHT: 60 IN

## 2019-02-14 DIAGNOSIS — Z72.0 TOBACCO USE: Primary | Chronic | ICD-10-CM

## 2019-02-14 DIAGNOSIS — R06.09 DYSPNEA ON EXERTION: Chronic | ICD-10-CM

## 2019-02-14 PROBLEM — J44.1 COPD EXACERBATION (HCC): Status: RESOLVED | Noted: 2017-11-25 | Resolved: 2019-02-14

## 2019-02-14 PROCEDURE — G8427 DOCREV CUR MEDS BY ELIG CLIN: HCPCS | Performed by: INTERNAL MEDICINE

## 2019-02-14 PROCEDURE — 3017F COLORECTAL CA SCREEN DOC REV: CPT | Performed by: INTERNAL MEDICINE

## 2019-02-14 PROCEDURE — 99214 OFFICE O/P EST MOD 30 MIN: CPT | Performed by: INTERNAL MEDICINE

## 2019-02-14 PROCEDURE — G8598 ASA/ANTIPLAT THER USED: HCPCS | Performed by: INTERNAL MEDICINE

## 2019-02-14 PROCEDURE — G8482 FLU IMMUNIZE ORDER/ADMIN: HCPCS | Performed by: INTERNAL MEDICINE

## 2019-02-14 PROCEDURE — G8420 CALC BMI NORM PARAMETERS: HCPCS | Performed by: INTERNAL MEDICINE

## 2019-02-14 PROCEDURE — 1036F TOBACCO NON-USER: CPT | Performed by: INTERNAL MEDICINE

## 2019-02-14 RX ORDER — ONDANSETRON 4 MG/1
TABLET, FILM COATED ORAL
Refills: 5 | COMMUNITY
Start: 2019-02-08 | End: 2019-02-14 | Stop reason: SDUPTHER

## 2019-02-14 RX ORDER — INSULIN GLARGINE 100 [IU]/ML
INJECTION, SOLUTION SUBCUTANEOUS
Refills: 3 | COMMUNITY
Start: 2019-02-08 | End: 2019-05-05

## 2019-02-20 ENCOUNTER — TELEPHONE (OUTPATIENT)
Dept: PRIMARY CARE CLINIC | Age: 64
End: 2019-02-20

## 2019-02-20 DIAGNOSIS — G44.89 OTHER HEADACHE SYNDROME: Primary | ICD-10-CM

## 2019-02-20 RX ORDER — BUTALBITAL, ASPIRIN, AND CAFFEINE 50; 325; 40 MG/1; MG/1; MG/1
1 CAPSULE ORAL EVERY 6 HOURS PRN
Qty: 30 CAPSULE | Refills: 0 | Status: ON HOLD | OUTPATIENT
Start: 2019-02-20 | End: 2019-07-30

## 2019-02-28 ENCOUNTER — TELEPHONE (OUTPATIENT)
Dept: PULMONOLOGY | Age: 64
End: 2019-02-28

## 2019-03-06 ENCOUNTER — OFFICE VISIT (OUTPATIENT)
Dept: PRIMARY CARE CLINIC | Age: 64
End: 2019-03-06
Payer: COMMERCIAL

## 2019-03-06 ENCOUNTER — TELEPHONE (OUTPATIENT)
Dept: PRIMARY CARE CLINIC | Age: 64
End: 2019-03-06

## 2019-03-06 VITALS
HEIGHT: 60 IN | SYSTOLIC BLOOD PRESSURE: 160 MMHG | DIASTOLIC BLOOD PRESSURE: 80 MMHG | BODY MASS INDEX: 23.95 KG/M2 | HEART RATE: 92 BPM | WEIGHT: 122 LBS

## 2019-03-06 DIAGNOSIS — E11.43 DIABETIC GASTROPARESIS (HCC): ICD-10-CM

## 2019-03-06 DIAGNOSIS — L85.3 DRY SKIN DERMATITIS: ICD-10-CM

## 2019-03-06 DIAGNOSIS — I25.10 CORONARY ARTERY DISEASE INVOLVING NATIVE CORONARY ARTERY OF NATIVE HEART WITHOUT ANGINA PECTORIS: ICD-10-CM

## 2019-03-06 DIAGNOSIS — I10 ESSENTIAL HYPERTENSION: ICD-10-CM

## 2019-03-06 DIAGNOSIS — K31.84 DIABETIC GASTROPARESIS (HCC): ICD-10-CM

## 2019-03-06 DIAGNOSIS — G89.4 CHRONIC PAIN SYNDROME: ICD-10-CM

## 2019-03-06 DIAGNOSIS — R63.4 WEIGHT LOSS: Primary | ICD-10-CM

## 2019-03-06 PROCEDURE — 3017F COLORECTAL CA SCREEN DOC REV: CPT | Performed by: INTERNAL MEDICINE

## 2019-03-06 PROCEDURE — 3045F PR MOST RECENT HEMOGLOBIN A1C LEVEL 7.0-9.0%: CPT | Performed by: INTERNAL MEDICINE

## 2019-03-06 PROCEDURE — G8482 FLU IMMUNIZE ORDER/ADMIN: HCPCS | Performed by: INTERNAL MEDICINE

## 2019-03-06 PROCEDURE — G8420 CALC BMI NORM PARAMETERS: HCPCS | Performed by: INTERNAL MEDICINE

## 2019-03-06 PROCEDURE — 2022F DILAT RTA XM EVC RTNOPTHY: CPT | Performed by: INTERNAL MEDICINE

## 2019-03-06 PROCEDURE — G8427 DOCREV CUR MEDS BY ELIG CLIN: HCPCS | Performed by: INTERNAL MEDICINE

## 2019-03-06 PROCEDURE — G8598 ASA/ANTIPLAT THER USED: HCPCS | Performed by: INTERNAL MEDICINE

## 2019-03-06 PROCEDURE — 1036F TOBACCO NON-USER: CPT | Performed by: INTERNAL MEDICINE

## 2019-03-06 PROCEDURE — 99214 OFFICE O/P EST MOD 30 MIN: CPT | Performed by: INTERNAL MEDICINE

## 2019-03-06 RX ORDER — TORSEMIDE 20 MG/1
20 TABLET ORAL DAILY
Qty: 30 TABLET | Refills: 5 | Status: ON HOLD | OUTPATIENT
Start: 2019-03-06 | End: 2019-07-28

## 2019-03-06 RX ORDER — BLOOD-GLUCOSE METER
1 KIT MISCELLANEOUS ONCE
Qty: 1 KIT | Refills: 1 | Status: SHIPPED | OUTPATIENT
Start: 2019-03-06 | End: 2019-07-01 | Stop reason: SDUPTHER

## 2019-03-06 RX ORDER — LOPERAMIDE HYDROCHLORIDE 2 MG/1
2 CAPSULE ORAL 3 TIMES DAILY PRN
Qty: 30 CAPSULE | Refills: 1 | Status: SHIPPED | OUTPATIENT
Start: 2019-03-06 | End: 2019-04-03 | Stop reason: SDUPTHER

## 2019-03-06 RX ORDER — FEXOFENADINE HCL 180 MG/1
180 TABLET ORAL DAILY
Qty: 30 TABLET | Refills: 0 | Status: SHIPPED | OUTPATIENT
Start: 2019-03-06 | End: 2019-04-05

## 2019-03-06 RX ORDER — INSULIN GLARGINE 100 [IU]/ML
55 INJECTION, SOLUTION SUBCUTANEOUS 2 TIMES DAILY
Qty: 9 VIAL | Refills: 5 | Status: SHIPPED | OUTPATIENT
Start: 2019-03-06 | End: 2019-04-11

## 2019-03-06 RX ORDER — AMLODIPINE BESYLATE 10 MG/1
5 TABLET ORAL DAILY
Qty: 30 TABLET | Refills: 5 | Status: SHIPPED | OUTPATIENT
Start: 2019-03-06 | End: 2019-08-20

## 2019-03-06 RX ORDER — ISOSORBIDE MONONITRATE 30 MG/1
30 TABLET, EXTENDED RELEASE ORAL DAILY
Qty: 30 TABLET | Refills: 5 | Status: SHIPPED | OUTPATIENT
Start: 2019-03-06 | End: 2019-08-29 | Stop reason: SDUPTHER

## 2019-03-06 RX ORDER — HYDRALAZINE HYDROCHLORIDE 50 MG/1
50 TABLET, FILM COATED ORAL 2 TIMES DAILY
Qty: 60 TABLET | Refills: 5 | Status: ON HOLD | OUTPATIENT
Start: 2019-03-06 | End: 2019-08-24 | Stop reason: HOSPADM

## 2019-03-06 ASSESSMENT — ENCOUNTER SYMPTOMS
ABDOMINAL DISTENTION: 0
BACK PAIN: 1
DIARRHEA: 1
WHEEZING: 1
ABDOMINAL PAIN: 0
SHORTNESS OF BREATH: 1
CHEST TIGHTNESS: 0
NAUSEA: 1
EYES NEGATIVE: 1

## 2019-03-12 ENCOUNTER — OFFICE VISIT (OUTPATIENT)
Dept: PAIN MANAGEMENT | Age: 64
End: 2019-03-12
Payer: COMMERCIAL

## 2019-03-12 ENCOUNTER — HOSPITAL ENCOUNTER (OUTPATIENT)
Dept: CARDIAC REHAB | Age: 64
Setting detail: THERAPIES SERIES
Discharge: HOME | End: 2019-03-12
Payer: COMMERCIAL

## 2019-03-12 VITALS
HEART RATE: 83 BPM | BODY MASS INDEX: 23.83 KG/M2 | WEIGHT: 122 LBS | SYSTOLIC BLOOD PRESSURE: 126 MMHG | DIASTOLIC BLOOD PRESSURE: 75 MMHG

## 2019-03-12 DIAGNOSIS — E11.40 CHRONIC PAINFUL DIABETIC NEUROPATHY (HCC): ICD-10-CM

## 2019-03-12 DIAGNOSIS — J44.9 CHRONIC OBSTRUCTIVE PULMONARY DISEASE, UNSPECIFIED COPD TYPE (HCC): ICD-10-CM

## 2019-03-12 DIAGNOSIS — M75.81 ROTATOR CUFF TENDONITIS, RIGHT: ICD-10-CM

## 2019-03-12 DIAGNOSIS — M79.7 FIBROMYALGIA: ICD-10-CM

## 2019-03-12 DIAGNOSIS — M50.30 DDD (DEGENERATIVE DISC DISEASE), CERVICAL: ICD-10-CM

## 2019-03-12 DIAGNOSIS — G89.4 CHRONIC PAIN SYNDROME: ICD-10-CM

## 2019-03-12 DIAGNOSIS — Z87.891 PERSONAL HISTORY OF NICOTINE DEPENDENCE: ICD-10-CM

## 2019-03-12 PROCEDURE — 3017F COLORECTAL CA SCREEN DOC REV: CPT | Performed by: INTERNAL MEDICINE

## 2019-03-12 PROCEDURE — 94618 PULMONARY STRESS TESTING: CPT

## 2019-03-12 PROCEDURE — 94618 PULMONARY STRESS TESTING: CPT | Performed by: INTERNAL MEDICINE

## 2019-03-12 PROCEDURE — 3045F PR MOST RECENT HEMOGLOBIN A1C LEVEL 7.0-9.0%: CPT | Performed by: INTERNAL MEDICINE

## 2019-03-12 PROCEDURE — G8482 FLU IMMUNIZE ORDER/ADMIN: HCPCS | Performed by: INTERNAL MEDICINE

## 2019-03-12 PROCEDURE — G8598 ASA/ANTIPLAT THER USED: HCPCS | Performed by: INTERNAL MEDICINE

## 2019-03-12 PROCEDURE — 2022F DILAT RTA XM EVC RTNOPTHY: CPT | Performed by: INTERNAL MEDICINE

## 2019-03-12 PROCEDURE — 99213 OFFICE O/P EST LOW 20 MIN: CPT | Performed by: INTERNAL MEDICINE

## 2019-03-12 PROCEDURE — G8420 CALC BMI NORM PARAMETERS: HCPCS | Performed by: INTERNAL MEDICINE

## 2019-03-12 PROCEDURE — 1036F TOBACCO NON-USER: CPT | Performed by: INTERNAL MEDICINE

## 2019-03-12 PROCEDURE — G8427 DOCREV CUR MEDS BY ELIG CLIN: HCPCS | Performed by: INTERNAL MEDICINE

## 2019-03-12 RX ORDER — DULOXETIN HYDROCHLORIDE 60 MG/1
60 CAPSULE, DELAYED RELEASE ORAL DAILY
Qty: 30 CAPSULE | Refills: 0 | Status: SHIPPED | OUTPATIENT
Start: 2019-03-12 | End: 2019-04-09 | Stop reason: SDUPTHER

## 2019-03-12 RX ORDER — OXYCODONE AND ACETAMINOPHEN 7.5; 325 MG/1; MG/1
1 TABLET ORAL EVERY 6 HOURS PRN
Qty: 100 TABLET | Refills: 0 | Status: SHIPPED | OUTPATIENT
Start: 2019-03-12 | End: 2019-04-09 | Stop reason: SDUPTHER

## 2019-03-12 RX ORDER — BUPROPION HYDROCHLORIDE 150 MG/1
150 TABLET ORAL EVERY MORNING
Qty: 30 TABLET | Refills: 1 | Status: SHIPPED | OUTPATIENT
Start: 2019-03-12 | End: 2019-04-09 | Stop reason: SDUPTHER

## 2019-03-12 RX ORDER — TOPIRAMATE 100 MG/1
100 TABLET, FILM COATED ORAL 2 TIMES DAILY
Qty: 60 TABLET | Refills: 1 | Status: SHIPPED | OUTPATIENT
Start: 2019-03-12 | End: 2019-04-09 | Stop reason: SDUPTHER

## 2019-03-13 RX ORDER — DULOXETIN HYDROCHLORIDE 60 MG/1
60 CAPSULE, DELAYED RELEASE ORAL DAILY
Qty: 30 CAPSULE | Refills: 0 | OUTPATIENT
Start: 2019-03-13

## 2019-03-14 ENCOUNTER — TELEPHONE (OUTPATIENT)
Dept: PRIMARY CARE CLINIC | Age: 64
End: 2019-03-14

## 2019-03-14 DIAGNOSIS — E11.43 DIABETIC GASTROPARESIS (HCC): ICD-10-CM

## 2019-03-14 DIAGNOSIS — K21.00 GASTROESOPHAGEAL REFLUX DISEASE WITH ESOPHAGITIS: Primary | ICD-10-CM

## 2019-03-14 DIAGNOSIS — K31.84 DIABETIC GASTROPARESIS (HCC): ICD-10-CM

## 2019-03-15 ENCOUNTER — TELEPHONE (OUTPATIENT)
Dept: PAIN MANAGEMENT | Age: 64
End: 2019-03-15

## 2019-03-15 RX ORDER — METOPROLOL SUCCINATE 50 MG/1
50 TABLET, EXTENDED RELEASE ORAL DAILY
Qty: 30 TABLET | Refills: 3 | Status: SHIPPED | OUTPATIENT
Start: 2019-03-15 | End: 2019-08-02 | Stop reason: SDUPTHER

## 2019-03-20 ENCOUNTER — TELEPHONE (OUTPATIENT)
Dept: PRIMARY CARE CLINIC | Age: 64
End: 2019-03-20

## 2019-03-21 DIAGNOSIS — I87.8 POOR VENOUS ACCESS: Primary | ICD-10-CM

## 2019-03-22 DIAGNOSIS — I87.8 POOR VENOUS ACCESS: Primary | ICD-10-CM

## 2019-03-25 ENCOUNTER — INITIAL CONSULT (OUTPATIENT)
Dept: SURGERY | Age: 64
End: 2019-03-25

## 2019-03-25 VITALS
WEIGHT: 122 LBS | BODY MASS INDEX: 23.95 KG/M2 | DIASTOLIC BLOOD PRESSURE: 70 MMHG | SYSTOLIC BLOOD PRESSURE: 110 MMHG | HEIGHT: 60 IN

## 2019-03-25 DIAGNOSIS — I82.220 INFERIOR VENA CAVA OCCLUSION (HCC): Primary | ICD-10-CM

## 2019-03-25 PROCEDURE — 99999 PR OFFICE/OUTPT VISIT,PROCEDURE ONLY: CPT | Performed by: SURGERY

## 2019-03-27 ENCOUNTER — TELEPHONE (OUTPATIENT)
Dept: PRIMARY CARE CLINIC | Age: 64
End: 2019-03-27

## 2019-04-01 ENCOUNTER — TELEPHONE (OUTPATIENT)
Dept: PRIMARY CARE CLINIC | Age: 64
End: 2019-04-01

## 2019-04-03 DIAGNOSIS — E11.43 DIABETIC GASTROPARESIS (HCC): ICD-10-CM

## 2019-04-03 DIAGNOSIS — K31.84 DIABETIC GASTROPARESIS (HCC): ICD-10-CM

## 2019-04-03 DIAGNOSIS — G47.00 INSOMNIA, UNSPECIFIED TYPE: Primary | ICD-10-CM

## 2019-04-03 RX ORDER — ZOLPIDEM TARTRATE 5 MG/1
TABLET ORAL
Qty: 30 TABLET | Refills: 1 | Status: SHIPPED | OUTPATIENT
Start: 2019-04-03 | End: 2019-04-09

## 2019-04-03 RX ORDER — LOPERAMIDE HYDROCHLORIDE 2 MG/1
2 CAPSULE ORAL 3 TIMES DAILY PRN
Qty: 30 CAPSULE | Refills: 1 | Status: SHIPPED | OUTPATIENT
Start: 2019-04-03 | End: 2019-04-13

## 2019-04-05 ENCOUNTER — OFFICE VISIT (OUTPATIENT)
Dept: SURGERY | Age: 64
End: 2019-04-05
Payer: COMMERCIAL

## 2019-04-05 VITALS
WEIGHT: 124 LBS | HEART RATE: 103 BPM | BODY MASS INDEX: 24.22 KG/M2 | SYSTOLIC BLOOD PRESSURE: 138 MMHG | DIASTOLIC BLOOD PRESSURE: 85 MMHG

## 2019-04-05 DIAGNOSIS — I25.2 HISTORY OF HEART ATTACK: ICD-10-CM

## 2019-04-05 DIAGNOSIS — I48.0 PAROXYSMAL ATRIAL FIBRILLATION (HCC): ICD-10-CM

## 2019-04-05 DIAGNOSIS — I82.220 INFERIOR VENA CAVA OCCLUSION (HCC): Primary | ICD-10-CM

## 2019-04-05 PROCEDURE — 3017F COLORECTAL CA SCREEN DOC REV: CPT | Performed by: SURGERY

## 2019-04-05 PROCEDURE — G8420 CALC BMI NORM PARAMETERS: HCPCS | Performed by: SURGERY

## 2019-04-05 PROCEDURE — G8427 DOCREV CUR MEDS BY ELIG CLIN: HCPCS | Performed by: SURGERY

## 2019-04-05 PROCEDURE — 99213 OFFICE O/P EST LOW 20 MIN: CPT | Performed by: SURGERY

## 2019-04-05 ASSESSMENT — ENCOUNTER SYMPTOMS
GASTROINTESTINAL NEGATIVE: 1
RESPIRATORY NEGATIVE: 1
EYES NEGATIVE: 1
ALLERGIC/IMMUNOLOGIC NEGATIVE: 1

## 2019-04-05 NOTE — PROGRESS NOTES
Daily Progress Note   Dee Jeffries MD      4/5/2019    Chief Complaint   Patient presents with   [address]         HISTORY OF PRESENT ILLNESS:                The patient is a 59 y.o. female who presents with a office visit for a new port-a-cath placement. She mentions the medication can go into the current port of the right femoral region, but nothing can be drawn out. This has been ongoing for 2 years. She complains of pain to the lower extremities, with walking, the pain is located in the bilateral calfs. Unnoticed swelling of the bilateral legs, only pain.      Hx of MI in July of last year with 3 stents placed, performed by Dr. Khanh Shanks, On  Plavix   Does have Hx of A-Fib  Hx of  Chronic Kidney Disease  Also have GI issues        Past Medical History:   Diagnosis Date    Acid reflux     Anemia     Anxiety     Arthritis     Asthma     Atrial fibrillation [facility] [address]     Blood transfusion reaction     CAD (coronary artery disease) 12/3/2012    CH[facility]estive [facility] failure[facility]     Chronic kidney disease     40% kidney functiom    COPD (chronic obstructive pulmonary disease) ([facility])     Depression     DM2 (diabetes mellitus, type 2) ([facility])     Dysarthria     Fibromyalgia 6/7/2016    Headache(784.0) 2/19/2013    Hemisensory loss     Hyperlipidemia     Hypertension     IBS (irritable bowel syndrome)     Inferior vena cava occlusion ([facility])     Irritable bowel syndrome     Keratitis     Neuropathy     Superior vena cava obstruction     Temporal arteritis ([facility] [address] 2/24/2014       Past Surgical History:   Procedure Laterality Date    ABLATION OF DYSRHYTHMIC FOCUS      ARTERY BIOPSY Right 04/23/2014    RIGHT TEMPORAL ARTERY BIOPSY    CATARACT REMOVAL Bilateral     CHOLECYSTECTOMY      CORONARY ANGIOPLASTY WITH STENT PLACEMENT      CORONARY ANGIOPLASTY WITH STENT PLACEMENT      HYSTERECTOMY      JOINT REPLACEMENT Right     KNEE ARTHROSCOPY Right     KNEE SURGERY right knee replacement    TUNNELED VENOUS PORT PLACEMENT      left thigh.  VASCULAR SURGERY         Social History     Socioeconomic History    Marital status:       Spouse name: Not on file    Number of children: 6    Years of education: Not on file    Highest education level: Not on file   Occupational History    Not on file   Social Needs    Financial resource strain: Not on file    Food insecurity:     Worry: Not on file     Inability: Not on file    Transportation needs:     Medical: Not on file     Non-medical: Not on file   Tobacco Use    Smoking status: Former Smoker     Packs/day: 0.50     Years: 35.00     Pack years: 17.50     Types: Cigarettes     Last attempt to quit: 2018     Years since quittin.7    Smokeless tobacco: Never Used    Tobacco comment: 5/13/15 has not smoked since hospitalization -    Substance and Sexual Activity    Alcohol use: No     Alcohol/week: 0.0 oz    Drug use: No    Sexual activity: Yes     Partners: Male   Lifestyle    Physical activity:     Days per week: Not on file     Minutes per session: Not on file    Stress: Not on file   Relationships    Social connections:     Talks on phone: Not on file     Gets together: Not on file     Attends [employer] service: Not on file     Active member of club or organization: Not on file     Attends meetings of clubs or organizations: Not on file     Relationship status: Not on file    Intimate partner violence:     Fear of current or ex partner: Not on file     Emotionally abused: Not on file     Physically abused: Not on file     Forced sexual activity: Not on file   Other Topics Concern    Not on file   Social History Narrative    Not on file       Family History   Problem Relation Age of Onset    Diabetes Mother     Hypertension Mother     High Cholesterol Mother     Stroke Mother     Cancer Mother          Current Outpatient Medications:     zolpidem (AMBIEN) 5 MG tablet, TAKE ONE TABLET -40 MG per capsule, Take 1 capsule by mouth every 6 hours as needed for Headaches for up to 14 days. ., Disp: 30 capsule, Rfl: 0    Patient has no known allergies. Vitals:    04/05/19 0937   BP: 138/85   Pulse: 103   Weight: 124 lb (56.2 kg)       Office Visit on 02/05/2019   Component Date Value Ref Range Status    Hemoglobin A1C 02/05/2019 8.9  % Final       Review of Systems   Constitutional: Negative. HENT: Negative. Eyes: Negative. Respiratory: Negative. Cardiovascular: Positive for leg swelling. Negative for chest pain and palpitations. Gastrointestinal: Negative. Endocrine: Negative. Genitourinary: Negative. Musculoskeletal: Negative. Allergic/Immunologic: Negative. Neurological: Negative. Hematological: Negative. Psychiatric/Behavioral: Negative. All other systems reviewed and are negative. Physical Exam   Constitutional: She is oriented to person, place, and time. Vital signs are normal. She appears well-developed and well-nourished. Non-toxic appearance. She does not have a sickly appearance. She does not appear ill. No distress. HENT:   Head: Normocephalic and atraumatic. Right Ear: External ear normal.   Left Ear: External ear normal.   Eyes: Pupils are equal, round, and reactive to light. No scleral icterus. Neck: Normal range of motion. Neck supple. Normal carotid pulses and no JVD present. Carotid bruit is not present. No tracheal deviation, no edema and no erythema present. No thyromegaly present. Cardiovascular: Normal rate, regular rhythm, S1 normal, normal heart sounds and intact distal pulses. No murmur heard. Pulses:       Femoral pulses are 2+ on the right side, and 2+ on the left side. Dorsalis pedis pulses are 1+ on the right side, and 2+ on the left side. Posterior tibial pulses are 0 on the right side, and 2+ on the left side. Doppler 4/5/2019:  Rt DP:  Monophasic (strong)  Rt PT: Biphasic     Lt DP: Biphasic   Lt PT: Biphasic       MEASUREMENTS 4/5/2019:    RIGHT ANKLE: 16.7 cm  RIGHT CALF: 28.6 cm    LEFT ANKLE: 17 cm  LEFT CALF: 28.7 cm     Pulmonary/Chest: Effort normal and breath sounds normal. No accessory muscle usage or stridor. No tachypnea. No respiratory distress. Abdominal: Soft. Bowel sounds are normal. She exhibits no distension and no mass. There is no hepatosplenomegaly. There is no tenderness. There is no rebound, no guarding and no CVA tenderness. No hernia. Hernia confirmed negative in the ventral area, confirmed negative in the right inguinal area and confirmed negative in the left inguinal area. Genitourinary:   Genitourinary Comments: Rectal exam/stool guaiac not indicated. Musculoskeletal: She exhibits no edema or tenderness. Right shoulder: She exhibits normal range of motion, no deformity and no pain. Legs:  Neurological: She is oriented to person, place, and time. She displays no atrophy. No cranial nerve deficit or sensory deficit. She exhibits normal muscle tone. Coordination and gait normal.   Skin: Skin is warm and dry. No bruising, no laceration, no lesion and no rash noted. No cyanosis or erythema. No pallor. Psychiatric: She has a normal mood and affect. Her speech is normal and behavior is normal. Judgment and thought content normal. Cognition and memory are normal.   Nursing note and vitals reviewed. ASSESSMENT:    Problem List Items Addressed This Visit     Inferior vena cava occlusion ([address] - Primary    A-fib ([address]      Other Visit Diagnoses     History of heart attack              PLAN:    Ms. Elizabeth Ashley has had a previous Removal of left femoral Port-A-Cath. Placement of  right femoral Port-A-Cath. Venogram, performed on 2/25/2018, this was the latest port-a -cath placement. Ms. Rajni Perez has been advised that a port-a-cath replacement today does not seem favorable.  If the catheter stops working as far as infusing then changing it over a guidewire and perhaps putting it out into a collateral might help. However I doubt she'll get what she wants which is to be able to draw blood from it. She does have a terrible time getting blood work done that has been going to the ER with a use an ultrasound to find the vein to draw the blood if she does develop renal failure perhaps peritoneal dialysis is her only option. Also discussed the results of the recent CMP, her kidney function appears to be stable. She does complain of pain to the bilateral calf's, during today's examination claudication is not present, arteries are fine. Explained to her this could be a result of complications associated to her veins. Return if symptoms worsen or fail to improve. I Jeniffer Carney, MA am scribing for and in the presence of Celia Ariza MD on this date of 04/05/19    I Estela Nieto MD personally performed the services described in this documentation as scribed by the Medical Assistant Jeniffer Carney  in my presence and it is both accurate and complete.         Electronically signed by Celia Ariza MD on 4/5/2019 at 10:20 AM

## 2019-04-09 ENCOUNTER — OFFICE VISIT (OUTPATIENT)
Dept: PAIN MANAGEMENT | Age: 64
End: 2019-04-09
Payer: COMMERCIAL

## 2019-04-09 VITALS
SYSTOLIC BLOOD PRESSURE: 138 MMHG | BODY MASS INDEX: 23.63 KG/M2 | HEART RATE: 102 BPM | WEIGHT: 121 LBS | DIASTOLIC BLOOD PRESSURE: 82 MMHG

## 2019-04-09 DIAGNOSIS — F33.1 MODERATE EPISODE OF RECURRENT MAJOR DEPRESSIVE DISORDER (HCC): ICD-10-CM

## 2019-04-09 DIAGNOSIS — M79.7 FIBROMYALGIA: ICD-10-CM

## 2019-04-09 DIAGNOSIS — G89.4 CHRONIC PAIN SYNDROME: ICD-10-CM

## 2019-04-09 DIAGNOSIS — E11.40 CHRONIC PAINFUL DIABETIC NEUROPATHY (HCC): ICD-10-CM

## 2019-04-09 DIAGNOSIS — F51.01 PRIMARY INSOMNIA: ICD-10-CM

## 2019-04-09 DIAGNOSIS — M50.30 DDD (DEGENERATIVE DISC DISEASE), CERVICAL: ICD-10-CM

## 2019-04-09 DIAGNOSIS — G43.711 HEADACHE, CHRONIC MIGRAINE WITHOUT AURA, INTRACTABLE, WITH STATUS: ICD-10-CM

## 2019-04-09 DIAGNOSIS — K21.9 GASTRO-ESOPHAGEAL REFLUX DISEASE WITHOUT ESOPHAGITIS: ICD-10-CM

## 2019-04-09 PROCEDURE — G8598 ASA/ANTIPLAT THER USED: HCPCS | Performed by: INTERNAL MEDICINE

## 2019-04-09 PROCEDURE — 99214 OFFICE O/P EST MOD 30 MIN: CPT | Performed by: INTERNAL MEDICINE

## 2019-04-09 PROCEDURE — 3045F PR MOST RECENT HEMOGLOBIN A1C LEVEL 7.0-9.0%: CPT | Performed by: INTERNAL MEDICINE

## 2019-04-09 PROCEDURE — 1036F TOBACCO NON-USER: CPT | Performed by: INTERNAL MEDICINE

## 2019-04-09 PROCEDURE — G8420 CALC BMI NORM PARAMETERS: HCPCS | Performed by: INTERNAL MEDICINE

## 2019-04-09 PROCEDURE — G8427 DOCREV CUR MEDS BY ELIG CLIN: HCPCS | Performed by: INTERNAL MEDICINE

## 2019-04-09 PROCEDURE — 2022F DILAT RTA XM EVC RTNOPTHY: CPT | Performed by: INTERNAL MEDICINE

## 2019-04-09 PROCEDURE — 3017F COLORECTAL CA SCREEN DOC REV: CPT | Performed by: INTERNAL MEDICINE

## 2019-04-09 RX ORDER — TOPIRAMATE 100 MG/1
100 TABLET, FILM COATED ORAL 2 TIMES DAILY
Qty: 60 TABLET | Refills: 1 | Status: SHIPPED | OUTPATIENT
Start: 2019-04-09 | End: 2019-05-29 | Stop reason: SDUPTHER

## 2019-04-09 RX ORDER — DULOXETIN HYDROCHLORIDE 60 MG/1
60 CAPSULE, DELAYED RELEASE ORAL DAILY
Qty: 30 CAPSULE | Refills: 0 | Status: SHIPPED | OUTPATIENT
Start: 2019-04-09 | End: 2019-04-29 | Stop reason: SDUPTHER

## 2019-04-09 RX ORDER — OMEPRAZOLE 20 MG/1
20 CAPSULE, DELAYED RELEASE ORAL DAILY
Qty: 30 CAPSULE | Refills: 0 | Status: SHIPPED | OUTPATIENT
Start: 2019-04-09 | End: 2019-04-29 | Stop reason: SDUPTHER

## 2019-04-09 RX ORDER — OXYCODONE AND ACETAMINOPHEN 7.5; 325 MG/1; MG/1
1 TABLET ORAL EVERY 6 HOURS PRN
Qty: 100 TABLET | Refills: 0 | Status: SHIPPED | OUTPATIENT
Start: 2019-04-09 | End: 2019-05-08 | Stop reason: SDUPTHER

## 2019-04-09 RX ORDER — BUPROPION HYDROCHLORIDE 150 MG/1
150 TABLET ORAL EVERY MORNING
Qty: 30 TABLET | Refills: 1 | Status: SHIPPED | OUTPATIENT
Start: 2019-04-09 | End: 2019-05-29 | Stop reason: SDUPTHER

## 2019-04-09 RX ORDER — QUETIAPINE FUMARATE 25 MG/1
25-50 TABLET, FILM COATED ORAL NIGHTLY
Qty: 60 TABLET | Refills: 0 | Status: SHIPPED | OUTPATIENT
Start: 2019-04-09 | End: 2019-04-29 | Stop reason: SDUPTHER

## 2019-04-09 RX ORDER — TIZANIDINE 4 MG/1
TABLET ORAL
Qty: 60 TABLET | Refills: 0 | Status: SHIPPED | OUTPATIENT
Start: 2019-04-09 | End: 2019-05-29 | Stop reason: SDUPTHER

## 2019-04-09 NOTE — PROGRESS NOTES
feels \" I'm going to break down. \" She says she had a port put in and developed chronic diarrhea. Sleep is poor,  poor sleep latency, averages 2-3 hours of sleep at night. Intermittent, non restorative. Does not feel rested in AM. Complains of feeling sleepy  during the day, she says Ambien is not helping. She states that her moods have been depressed, tearful, labile with c/o loss of energy, having occasional crying spells. Denies being suicidal or homicidal. She reports her blood sugar has been around the 200 range. Patient is complaining of GERD symptoms. ALLERGIES/PAST MED/FAM/SOC HISTORY: Ms. Jose James allergies, past medical, family and social history were reviewed in the chart and also listed below. Social History     Socioeconomic History    Marital status:       Spouse name: Not on file    Number of children: 6    Years of education: Not on file    Highest education level: Not on file   Occupational History    Not on file   Social Needs    Financial resource strain: Not on file    Food insecurity:     Worry: Not on file     Inability: Not on file    Transportation needs:     Medical: Not on file     Non-medical: Not on file   Tobacco Use    Smoking status: Former Smoker     Packs/day: 0.50     Years: 35.00     Pack years: 17.50     Types: Cigarettes     Last attempt to quit: 2018     Years since quittin.7    Smokeless tobacco: Never Used    Tobacco comment: 5/13/15 has not smoked since hospitalization -    Substance and Sexual Activity    Alcohol use: No     Alcohol/week: 0.0 oz    Drug use: No    Sexual activity: Yes     Partners: Male   Lifestyle    Physical activity:     Days per week: Not on file     Minutes per session: Not on file    Stress: Not on file   Relationships    Social connections:     Talks on phone: Not on file     Gets together: Not on file     Attends Ca[employer]ism service: Not on file     Active member of club or organization: Not on file     Attends meetings of clubs or organizations: Not on file     Relationship status: Not on file    Intimate partner violence:     Fear of current or ex partner: Not on file     Emotionally abused: Not on file     Physically abused: Not on file     Forced sexual activity: Not on file   Other Topics Concern    Not on file   Social History Narrative    Not on file       Ms. Meza current medications are   Outpatient Medications Prior to Visit   Medication Sig Dispense Refill    loperamide (IMODIUM) 2 MG capsule TAKE 1 CAPSULE BY MOUTH 3 TIMES DAILY AS NEEDED FOR DIARRHEA 30 capsule 1    metoprolol succinate (TOPROL XL) 50 MG extended release tablet TAKE 1 TABLET BY MOUTH DAILY 30 tablet 3    topiramate (TOPAMAX) 100 MG tablet Take 1 tablet by mouth 2 times daily 60 tablet 1    buPROPion (WELLBUTRIN XL) 150 MG extended release tablet Take 1 tablet by mouth every morning 30 tablet 1    DULoxetine (CYMBALTA) 60 MG extended release capsule Take 1 capsule by mouth daily 30 capsule 0    oxyCODONE-acetaminophen (PERCOCET) 7.5-325 MG per tablet Take 1 tablet by mouth every 6 hours as needed for Pain (max 3-4 per day) for up to 28 days.  100 tablet 0    insulin glargine (LANTUS) 100 UNIT/ML injection vial Inject 55 Units into the skin 2 times daily 50 u bid 9 vial 5    insulin aspart (NOVOLOG FLEXPEN) 100 UNIT/ML injection pen Inject 5-10 Units into the skin 3 times daily (before meals) 5 pen 4    hydrALAZINE (APRESOLINE) 50 MG tablet Take 1 tablet by mouth 2 times daily 60 tablet 5    torsemide (DEMADEX) 20 MG tablet Take 1 tablet by mouth daily 30 tablet 5    isosorbide mononitrate (IMDUR) 30 MG extended release tablet Take 1 tablet by mouth daily 30 tablet 5    amLODIPine (NORVASC) 10 MG tablet Take 0.5 tablets by mouth daily 30 tablet 5    Crisaborole (EUCRISA) 2 % OINT Apply 2 applicators topically 3 times daily 2 g 3    BASAGLAR KWIKPEN 100 UNIT/ML injection pen   3    Continuous Blood Gluc  (FREESTYLE (FIORINAL) -40 MG per capsule Take 1 capsule by mouth every 6 hours as needed for Headaches for up to 14 days. . 30 capsule 0     No facility-administered medications prior to visit. REVIEW OF SYSTEMS:   Constitutional: Negative for fatigue and unexpected weight change. Eyes: Negative for visual disturbance. Respiratory: Negative for shortness of breath. Cardiovascular: Negative for chest pain, palpitations  Gastrointestinal: Negative for blood in stool, abdominal distention, nausea, vomiting, abdominal pain, diarrhea,constipation. Skin: Negative for color change or any abnormal bruising. Neurological: Negative for speech difficulty, weakness and light-headedness, dizziness, tremors, sleepiness  Psychiatric/Behavioral: Negative for suicidal ideas, hallucinations, behavioral problems, self-injury, decreased concentration/cognition, agitation, confusion. PHYSICAL EXAM:   Nursing note and vitals reviewed. /82   Pulse 102   Wt 121 lb (54.9 kg)   BMI 23.63 kg/m²   General Appearance: Patient is well nourished, well developed, well groomed and in no acute distress. Skin: Skin is warm and dry, good turgor . No rash or lesions noted. She is not diaphoretic. Pulmonary/Chest: Effort normal. No respiratory distress or use of accessory muscles. Auscultation revealing decreased air exchange bilaterally. She does not have wheezes in the lung fields. She has no rales. Cardiovascular: Normal rate, regular rhythm, normal heart sounds, and does not have murmur. Exam reveals no gallop and no friction rub. Abdominal: Soft. Bowel sounds are normal.  On inspection of abdomen is obese no distension and no mass. No tenderness. She has no rebound and no guarding. Musculoskeletal / Extremities: Range of motion is normal. Gait is normal, assistive devices use: none. She exhibits edema: none, and no tenderness. Neurological/Psychiatric:She is alert and oriented to person, place, and time. Coordination is  normal.   Judgement and Insight is normal  Her mood is Appropriate and affect is Flat/blunted and Anxious . Her behavior is normal.   thought content normal.        IMPRESSION:     1. Fibromyalgia - INADEQUATELY CONTROLLED: treatment plan adjusted as below    2. Primary insomnia INADEQUATELY CONTROLLED: treatment plan adjusted as below    3. Moderate episode of recurrent major depressive disorder (HCC) INADEQUATELY CONTROLLED: treatment plan adjusted as below    4. Chronic pain syndrome INADEQUATELY CONTROLLED: treatment plan adjusted as below    5. Gastro-esophageal reflux disease without esophagitis INADEQUATELY CONTROLLED: treatment plan adjusted as below    6. Chronic painful diabetic neuropathy (HCC) - STABLE: Continue current treatment plan    7. DDD (degenerative disc disease), cervical STABLE: Continue current treatment plan   8. Headache, chronic migraine without aura, intractable, with status STABLE: Continue current treatment plan       PLAN:  Informed verbal consent was obtained. -[place] was obtained and reviewed  for the last one year and no indicators of drug misuse  were found. Any other controlled substance prescriptions  seen on the record have been accounted for, I am aware of the patient receiving these medications. Cassandra Greer [place] record will be rechecked as part of office protocol.   -f/u with PCP regarding medical issues  -d/c Ambien, it is not helping, start Seroquel 25 mg 1-2 nightly  -continue with Cymbalta and Wellbutrin  -CBT techniques- relaxation therapies such as biofeedback, mindfulness based stress reduction, imagery, cognitive restructuring, problem solving discussed with patient   -advised to start water exercises  -advised to walk 15-20 minutes daily   -she was advised  to avoid using too many OTC analgesics to control the headaches, avoid chocolates, increased caffeine, cheeses and MSG nitrite containing foods, cigarette smoking.  To avoid bright lights, strong smells and skipping meals, continue with Topamax  -continue with current opioid regimen, continue with Percocet 3-4 per day  -Advised caffeine reduction, dietary changes, elevate head end of bed, NPO after supper, if using alcohol advised reduction of alcohol intake, tobacco cessation if smoking, weight loss, continue with Prilosec 20 mg daily  -she was advised proper sleep hygiene-told to avoid:use of caffeine or other stimulants after noon, alcohol use near bedtime, long or frequent naps during the day, erratic sleep schedule, heavy meals near bedtime, vigorous exercise near bedtime and use of electronic devices near bedtime  -start Zanaflex 1/2 tab in AM, 1 tab in PM PRN    Ms. Aliyah Henriquez will be prescribed  the medications  listed below which are for treatment of her presenting  medical problems which for this visit include:   Diagnoses of Chronic pain syndrome, Fibromyalgia, Chronic painful diabetic neuropathy ([facility] [address] DDD (degenerative disc disease), cervical, Headache, chronic migraine without aura, intractable, with status, Moderate episode of recurrent major depressive disorder ([facility] [address] Primary insomnia, and Gastro-esophageal reflux disease without esophagitis were pertinent to this visit.   Medications/orders associated with this visit:    Current Outpatient Medications   Medication Sig Dispense Refill    loperamide (IMODIUM) 2 MG capsule TAKE 1 CAPSULE BY MOUTH 3 TIMES DAILY AS NEEDED FOR DIARRHEA 30 capsule 1    metoprolol succinate (TOPROL XL) 50 MG extended release tablet TAKE 1 TABLET BY MOUTH DAILY 30 tablet 3    topiramate (TOPAMAX) 100 MG tablet Take 1 tablet by mouth 2 times daily 60 tablet 1    buPROPion (WELLBUTRIN XL) 150 MG extended release tablet Take 1 tablet by mouth every morning 30 tablet 1    DULoxetine (CYMBALTA) 60 MG extended release capsule Take 1 capsule by mouth daily 30 capsule 0    oxyCODONE-acetaminophen (PERCOCET) 7.5-325 MG per tablet Take 1 tablet by mouth every 6 hours as needed for Pain (max 3-4 per day) for up to 28 days. 100 tablet 0    insulin glargine (LANTUS) 100 UNIT/ML injection vial Inject 55 Units into the skin 2 times daily 50 u bid 9 vial 5    insulin aspart (NOVOLOG FLEXPEN) 100 UNIT/ML injection pen Inject 5-10 Units into the skin 3 times daily (before meals) 5 pen 4    hydrALAZINE (APRESOLINE) 50 MG tablet Take 1 tablet by mouth 2 times daily 60 tablet 5    torsemide (DEMADEX) 20 MG tablet Take 1 tablet by mouth daily 30 tablet 5    isosorbide mononitrate (IMDUR) 30 MG extended release tablet Take 1 tablet by mouth daily 30 tablet 5    amLODIPine (NORVASC) 10 MG tablet Take 0.5 tablets by mouth daily 30 tablet 5    Crisaborole (EUCRISA) 2 % OINT Apply 2 applicators topically 3 times daily 2 g 3    BASAGLAR KWIKPEN 100 UNIT/ML injection pen   3    Continuous Blood Gluc  (FREESTYLE LISSETT 14 DAY READER) DAYO TEST THREE TIMES DAILY  250.00  E11.9 1 Device 0    Continuous Blood Gluc Sensor (FREESTYLE LISSETT 14 DAY SENSOR) MISC Test three times daily   250.00 E 11.9 1 each 5    rosuvastatin (CRESTOR) 40 MG tablet Take 1 tablet by mouth nightly 90 tablet 5    ondansetron (ZOFRAN) 8 MG tablet Take 1 tablet by mouth every 8 hours as needed for Nausea or Vomiting 90 tablet 1    nitroGLYCERIN (NITROSTAT) 0.4 MG SL tablet PLACE 1 TABLET UNDER THE TONGUE EVERY 5 MINUTES AS NEEDED FOR CHEST PAIN.  25 tablet 2    ULTICARE MINI PEN NEEDLES 31G X 6 MM MISC USE THREE TIMES A  each 5    SM ASPIRIN ADULT LOW STRENGTH 81 MG EC tablet TAKE 1 TABLET BY MOUTH DA JULIEN 30 tablet 3    RANEXA 1000 MG extended release tablet TAKE 1 TABLET BY MOUTH 2 TIMES DAILY 60 tablet 3    budesonide-formoterol (SYMBICORT) 160-4.5 MCG/ACT AERO Inhale 2 puffs into the lungs daily (Patient taking differently: Inhale 2 puffs into the lungs daily as needed ) 11 g 4    albuterol sulfate HFA (PROAIR HFA) 108 (90 Base) MCG/ACT inhaler Inhale 2 puffs into the lungs every 6 hours as needed for Wheezing 1 Inhaler 5    chlorthalidone (HYGROTON) 25 MG tablet TAKE ONE TABLET BY MOUTH DAILY 90 tablet 0    nitroGLYCERIN (NITROSTAT) 0.3 MG SL tablet Place 1 tablet under the tongue every 5 minutes as needed for Chest pain 30 tablet 0    clopidogrel (PLAVIX) 75 MG tablet Take 1 tablet by mouth daily 90 tablet 5    ranitidine (ZANTAC) 150 MG tablet Take 1 tablet by mouth 2 times daily as needed for Heartburn (Patient taking differently: Take 150 mg by mouth 2 times daily ) 60 tablet 3    glucose monitoring kit (FREESTYLE) monitoring kit 1 each by Does not apply route once for 1 dose May dispense glucometer of patients preference along with compatible strips. 1 kit 1    butalbital-aspirin-caffeine (FIORINAL) -40 MG per capsule Take 1 capsule by mouth every 6 hours as needed for Headaches for up to 14 days. . 30 capsule 0     No current facility-administered medications for this visit. Goals of current treatment regimen include improvement in pain, restoration of functioning- with focus on improvement in physical performance, general activity, work or disability,emotional distress, health care utilization and  decreased medication consumption. Will continue to monitor progress towards achieving/maintaining therapeutic goals with special emphasis on  1. Improvement in perceived interfernce  of pain with ADL's. Ability to do home exercises independently. Ability to do household chores indoor and/or outdoor work and social and leisure activities. To increase flexibility/ROM, strength and endurance. Improve psychosocial and physical functioning.- she is not showing any significant progress/or showing regression  towards this goal and reassessment and adjustment of goals/treatment have been made. 2. Improving sleep to 6-7 hours a night. Improve mood/ anxiety and depression symptoms such as crying spells, low energy, problems with concentration, motivation. - she is not showing any significant progress/or showing regression  towards this goal and reassessment and adjustment of goals/treatment have been made. 3. Reduction of reliance on opioid analgesia/more appropriate opioid use. - she is showing progression towards this treatment goal with the current regimen. Risks and benefits of the medications and other alternative treatments have been/were  discussed with the patient. Any questions on the  common side effects of these medications were also answered. She was advised against drinking alcohol with the narcotic pain medicines, advised against driving or handling machinery when  starting or adjusting the dose of medicines, feeling groggy or drowsy, or if having any cognitive issues related to the current medications. Sheis fully aware of the risk of overdose and death, if medicines are misused and not taken as prescribed. If she develops new symptoms or if the symptoms worsen, she was told to call the office. .  Thank you for allowing me to participate in the care of this patient.     Ayesha Knight MD.    Cc: Tamanna Nguyen MD

## 2019-04-10 DIAGNOSIS — I25.10 CORONARY ARTERY DISEASE INVOLVING NATIVE CORONARY ARTERY OF NATIVE HEART WITHOUT ANGINA PECTORIS: ICD-10-CM

## 2019-04-11 ENCOUNTER — TELEPHONE (OUTPATIENT)
Dept: PRIMARY CARE CLINIC | Age: 64
End: 2019-04-11

## 2019-04-11 ENCOUNTER — OFFICE VISIT (OUTPATIENT)
Dept: PRIMARY CARE CLINIC | Age: 64
End: 2019-04-11
Payer: COMMERCIAL

## 2019-04-11 VITALS
BODY MASS INDEX: 23.95 KG/M2 | HEART RATE: 80 BPM | SYSTOLIC BLOOD PRESSURE: 110 MMHG | HEIGHT: 60 IN | WEIGHT: 122 LBS | DIASTOLIC BLOOD PRESSURE: 70 MMHG

## 2019-04-11 DIAGNOSIS — I10 ESSENTIAL HYPERTENSION: ICD-10-CM

## 2019-04-11 DIAGNOSIS — R63.4 WEIGHT LOSS: ICD-10-CM

## 2019-04-11 DIAGNOSIS — G44.89 OTHER HEADACHE SYNDROME: ICD-10-CM

## 2019-04-11 PROCEDURE — G8420 CALC BMI NORM PARAMETERS: HCPCS | Performed by: INTERNAL MEDICINE

## 2019-04-11 PROCEDURE — 2022F DILAT RTA XM EVC RTNOPTHY: CPT | Performed by: INTERNAL MEDICINE

## 2019-04-11 PROCEDURE — 3045F PR MOST RECENT HEMOGLOBIN A1C LEVEL 7.0-9.0%: CPT | Performed by: INTERNAL MEDICINE

## 2019-04-11 PROCEDURE — 3017F COLORECTAL CA SCREEN DOC REV: CPT | Performed by: INTERNAL MEDICINE

## 2019-04-11 PROCEDURE — 1036F TOBACCO NON-USER: CPT | Performed by: INTERNAL MEDICINE

## 2019-04-11 PROCEDURE — G8427 DOCREV CUR MEDS BY ELIG CLIN: HCPCS | Performed by: INTERNAL MEDICINE

## 2019-04-11 PROCEDURE — 99214 OFFICE O/P EST MOD 30 MIN: CPT | Performed by: INTERNAL MEDICINE

## 2019-04-11 PROCEDURE — G8598 ASA/ANTIPLAT THER USED: HCPCS | Performed by: INTERNAL MEDICINE

## 2019-04-11 RX ORDER — INSULIN GLARGINE 100 [IU]/ML
60 INJECTION, SOLUTION SUBCUTANEOUS 2 TIMES DAILY
Qty: 9 VIAL | Refills: 5 | Status: SHIPPED | OUTPATIENT
Start: 2019-04-11 | End: 2019-05-05

## 2019-04-11 RX ORDER — ASPIRIN 81 MG/1
TABLET, COATED ORAL
Qty: 30 TABLET | Refills: 3 | Status: SHIPPED | OUTPATIENT
Start: 2019-04-11 | End: 2019-08-29 | Stop reason: SDUPTHER

## 2019-04-11 RX ORDER — ONDANSETRON 4 MG/1
4 TABLET, FILM COATED ORAL 3 TIMES DAILY PRN
Qty: 30 TABLET | Refills: 3 | Status: SHIPPED | OUTPATIENT
Start: 2019-04-11 | End: 2019-05-05

## 2019-04-11 RX ORDER — RANOLAZINE 1000 MG/1
1000 TABLET, FILM COATED, EXTENDED RELEASE ORAL 2 TIMES DAILY
Qty: 60 TABLET | Refills: 3 | Status: ON HOLD | OUTPATIENT
Start: 2019-04-11 | End: 2019-07-30 | Stop reason: SDUPTHER

## 2019-04-11 RX ORDER — INSULIN GLARGINE 100 [IU]/ML
60 INJECTION, SOLUTION SUBCUTANEOUS 2 TIMES DAILY
Qty: 9 VIAL | Refills: 5 | Status: SHIPPED | OUTPATIENT
Start: 2019-04-11 | End: 2019-04-11 | Stop reason: SDUPTHER

## 2019-04-11 ASSESSMENT — ENCOUNTER SYMPTOMS
ABDOMINAL DISTENTION: 0
DIARRHEA: 1
EYES NEGATIVE: 1
BACK PAIN: 1
NAUSEA: 1
CHEST TIGHTNESS: 0
ABDOMINAL PAIN: 0
SHORTNESS OF BREATH: 1

## 2019-04-11 NOTE — PROGRESS NOTES
Subjective:      Patient ID: Beatris Phoenix is a 59 y.o. female. 4/11/19 Patient presents with: Follow-up  Headache  Diabetes: sugars still high           Last seen  3/6/19 Patient presents with:  Diarrhea  Nausea  Memory Loss  Rash                  Review of Systems   Constitutional: Negative for activity change, appetite change, fatigue, fever and unexpected weight change. Flu vac 9/18     tdap 10/16      pneumovac 10/13     Shingrex / Script  8/18      Eyes: Negative. Last Eye Ex 4/18 [facility]    Respiratory: Positive for shortness of breath (baseline). Negative for chest tightness. Smokes off and on . Seen  Pulmonary 2/19 for Ch Cough / SOB   Denies Etoh . No rec drugs    Cardiovascular: Negative. Known CAD with Stents . Last Cardiac exam 1/19 with Dr Lupis Harmon    Gastrointestinal: Positive for diarrhea and nausea. Negative for abdominal distention and abdominal pain. Scheduled for Upper / lower endopscopy 4/19       No Fh of ca colon . Genitourinary: Negative for dysuria, frequency, menstrual problem, urgency and vaginal discharge. Hysterectomy   Mammogram 1/16    Musculoskeletal: Positive for arthralgias, back pain and myalgias. Pain Disorder c/o Pain Management    Neurological: Negative for dizziness, weakness and headaches. Hematological:           Did see Hematology for anemia . Bone Marrow exam Nl    Psychiatric/Behavioral: Negative for behavioral problems and sleep disturbance. The patient is not nervous/anxious. Objective:   Physical Exam   Constitutional: She is oriented to person, place, and time. Eyes: Conjunctivae are normal.   Neck: Neck supple. Cardiovascular: Normal rate, regular rhythm and normal heart sounds. Pulmonary/Chest: She has no wheezes. Prolonged BS   Abdominal: Soft. She exhibits distension. There is no tenderness. There is no guarding. Musculoskeletal: Normal range of motion.  Tenderness: generalized    Neurological: She is alert and oriented to person, place, and time. She has normal strength. Skin: Skin is warm and dry. Rash: dry. Psychiatric: She exhibits a depressed mood. Vitals reviewed. Assessment:      Maren was seen today for follow-up, headache and diabetes. Diagnoses and all orders for this visit:    Uncontrolled type 2 diabetes mellitus with complication, with long-term current use of insulin ([facility])  Increase  Lantus to 60 U bid   -     insulin glargine (LANTUS) 100 UNIT/ML injection vial; Inject 60 Units into the skin 2 times daily 50 u bid  -     insulin aspart (NOVOLOG FLEXPEN) 100 UNIT/ML injection pen; Inject 5-10 Units into the skin 3 times daily (before meals)      Other headache syndrome   HOLD Imdur and see if headaches improve     Weight loss  Stable . Follow closely      Diabetic gastroparesis ([facility])  Control sugars . -    Essential hypertension  Controlled   -     hydrALAZINE (APRESOLINE) 50 MG tablet; Take 1 tablet by mouth 2 times daily  -     torsemide (DEMADEX) 20 MG tablet; Take 1 tablet by mouth daily  -     isosorbide mononitrate (IMDUR) 30 MG extended release tablet; Take 1 tablet by mouth daily  -     amLODIPine (NORVASC) 10 MG tablet; Take 0.5 tablets by mouth daily      Dry skin dermatitis  -     fexofenadine (ALLEGRA) 180 MG tablet; Take 1 tablet by mouth daily  -     Crisaborole (EUCRISA) 2 % OINT; Apply 2 applicators topically 3 times daily        Other hyperlipidemia  LDL > 100 . On  Crestor now   -     rosuvastatin (CRESTOR) 40 MG tablet; Take 1 tablet by mouth nightly      Pain disorder  C/O  Pain management ;       Coronary artery disease involving native coronary artery of native heart without angina pectoris  -     metoprolol succinate (TOPROL XL) 25 MG extended release tablet; Take 0.5 tablets by mouth 2 times daily  -     isosorbide mononitrate (IMDUR) 30 MG extended release tablet;  Take 1 tablet by mouth daily  -     torsemide (DEMADEX) 20 MG tablet; Take 1 tablet by mouth daily  -     clopidogrel (PLAVIX) 75 MG tablet; Take 1 tablet by mouth daily  -     ranolazine (RANEXA) 1000 MG extended release tablet; Take 1 tablet by mouth 2 times daily  -     aspirin EC 81 MG EC tablet; Take 1 tablet by mouth daily  -     Crestor 40 MG tablet; Take 1 tablet by mouth daily            Anxiety  .    cymbalta per Pain Dr   Plan:              Susan Pineda MD

## 2019-04-12 NOTE — TELEPHONE ENCOUNTER
Per pharmacy pt Lantus is not covered but Cristi Maxwell is. They are asking for a new script for quick pens and directions.  Please check and sign pended order

## 2019-04-17 ENCOUNTER — TELEPHONE (OUTPATIENT)
Dept: PRIMARY CARE CLINIC | Age: 64
End: 2019-04-17

## 2019-04-17 RX ORDER — SULFAMETHOXAZOLE AND TRIMETHOPRIM 400; 80 MG/1; MG/1
1 TABLET ORAL DAILY
Qty: 14 TABLET | Refills: 0 | Status: SHIPPED | OUTPATIENT
Start: 2019-04-17 | End: 2019-06-17 | Stop reason: SDUPTHER

## 2019-04-22 DIAGNOSIS — R11.0 NAUSEA: ICD-10-CM

## 2019-04-22 RX ORDER — ONDANSETRON HYDROCHLORIDE 8 MG/1
8 TABLET, FILM COATED ORAL EVERY 8 HOURS PRN
Qty: 90 TABLET | Refills: 0 | Status: SHIPPED | OUTPATIENT
Start: 2019-04-22 | End: 2020-01-14

## 2019-04-29 ENCOUNTER — TELEPHONE (OUTPATIENT)
Dept: PRIMARY CARE CLINIC | Age: 64
End: 2019-04-29

## 2019-04-29 DIAGNOSIS — G89.4 CHRONIC PAIN SYNDROME: ICD-10-CM

## 2019-04-29 RX ORDER — CIPROFLOXACIN 500 MG/1
500 TABLET, FILM COATED ORAL 2 TIMES DAILY
Qty: 10 TABLET | Refills: 0 | Status: SHIPPED | OUTPATIENT
Start: 2019-04-29 | End: 2019-05-04

## 2019-05-05 ENCOUNTER — HOSPITAL ENCOUNTER (OUTPATIENT)
Age: 64
Setting detail: OBSERVATION
Discharge: HOME | End: 2019-05-06
Attending: EMERGENCY MEDICINE | Admitting: INTERNAL MEDICINE
Payer: COMMERCIAL

## 2019-05-05 ENCOUNTER — APPOINTMENT (OUTPATIENT)
Dept: GENERAL RADIOLOGY | Age: 64
End: 2019-05-05
Payer: COMMERCIAL

## 2019-05-05 DIAGNOSIS — L85.3 DRY SKIN DERMATITIS: ICD-10-CM

## 2019-05-05 DIAGNOSIS — I20.0 UNSTABLE ANGINA PECTORIS (HCC): Primary | ICD-10-CM

## 2019-05-05 LAB
A/G RATIO: 0.9 (ref 1.1–2.2)
ALBUMIN SERPL-MCNC: 3.8 G/DL (ref 3.4–5)
ALP BLD-CCNC: 150 U/L (ref 40–129)
ALT SERPL-CCNC: 13 U/L (ref 10–40)
ANION GAP SERPL CALCULATED.3IONS-SCNC: 15 MMOL/L (ref 3–16)
AST SERPL-CCNC: 20 U/L (ref 15–37)
BASOPHILS ABSOLUTE: 0 K/UL (ref 0–0.2)
BASOPHILS RELATIVE PERCENT: 0.3 %
BILIRUB SERPL-MCNC: 0.4 MG/DL (ref 0–1)
BUN BLDV-MCNC: 25 MG/DL (ref 7–20)
CALCIUM SERPL-MCNC: 9.3 MG/DL (ref 8.3–10.6)
CHLORIDE BLD-SCNC: 100 MMOL/L (ref 99–110)
CO2: 20 MMOL/L (ref 21–32)
CREAT SERPL-MCNC: 1.4 MG/DL (ref 0.6–1.2)
D DIMER: <200 NG/ML DDU (ref 0–229)
EOSINOPHILS ABSOLUTE: 0.2 K/UL (ref 0–0.6)
EOSINOPHILS RELATIVE PERCENT: 3.3 %
GFR AFRICAN AMERICAN: 46
GFR NON-AFRICAN AMERICAN: 38
GLOBULIN: 4.3 G/DL
GLUCOSE BLD-MCNC: 122 MG/DL (ref 70–99)
GLUCOSE BLD-MCNC: 143 MG/DL (ref 70–99)
GLUCOSE BLD-MCNC: 160 MG/DL (ref 70–99)
GLUCOSE BLD-MCNC: 191 MG/DL (ref 70–99)
HCT VFR BLD CALC: 36.6 % (ref 36–48)
HEMOGLOBIN: 11.9 G/DL (ref 12–16)
LYMPHOCYTES ABSOLUTE: 1.3 K/UL (ref 1–5.1)
LYMPHOCYTES RELATIVE PERCENT: 22.9 %
MCH RBC QN AUTO: 33 PG (ref 26–34)
MCHC RBC AUTO-ENTMCNC: 32.6 G/DL (ref 31–36)
MCV RBC AUTO: 101.3 FL (ref 80–100)
MONOCYTES ABSOLUTE: 0.3 K/UL (ref 0–1.3)
MONOCYTES RELATIVE PERCENT: 4.7 %
NEUTROPHILS ABSOLUTE: 4 K/UL (ref 1.7–7.7)
NEUTROPHILS RELATIVE PERCENT: 68.8 %
PDW BLD-RTO: 14.1 % (ref 12.4–15.4)
PERFORMED ON: ABNORMAL
PLATELET # BLD: 229 K/UL (ref 135–450)
PMV BLD AUTO: 8.1 FL (ref 5–10.5)
POTASSIUM REFLEX MAGNESIUM: 4.4 MMOL/L (ref 3.5–5.1)
PRO-BNP: 356 PG/ML (ref 0–124)
RBC # BLD: 3.61 M/UL (ref 4–5.2)
SODIUM BLD-SCNC: 135 MMOL/L (ref 136–145)
TOTAL PROTEIN: 8.1 G/DL (ref 6.4–8.2)
TROPONIN: <0.01 NG/ML
WBC # BLD: 5.8 K/UL (ref 4–11)

## 2019-05-05 PROCEDURE — 93005 ELECTROCARDIOGRAM TRACING: CPT | Performed by: EMERGENCY MEDICINE

## 2019-05-05 PROCEDURE — 85379 FIBRIN DEGRADATION QUANT: CPT

## 2019-05-05 PROCEDURE — 84484 ASSAY OF TROPONIN QUANT: CPT

## 2019-05-05 PROCEDURE — G0378 HOSPITAL OBSERVATION PER HR: HCPCS

## 2019-05-05 PROCEDURE — 96375 TX/PRO/DX INJ NEW DRUG ADDON: CPT

## 2019-05-05 PROCEDURE — 94760 N-INVAS EAR/PLS OXIMETRY 1: CPT

## 2019-05-05 PROCEDURE — 83880 ASSAY OF NATRIURETIC PEPTIDE: CPT

## 2019-05-05 PROCEDURE — 6370000000 HC RX 637 (ALT 250 FOR IP): Performed by: INTERNAL MEDICINE

## 2019-05-05 PROCEDURE — 99285 EMERGENCY DEPT VISIT HI MDM: CPT

## 2019-05-05 PROCEDURE — 71045 X-RAY EXAM CHEST 1 VIEW: CPT

## 2019-05-05 PROCEDURE — 6360000002 HC RX W HCPCS: Performed by: EMERGENCY MEDICINE

## 2019-05-05 PROCEDURE — 6370000000 HC RX 637 (ALT 250 FOR IP): Performed by: EMERGENCY MEDICINE

## 2019-05-05 PROCEDURE — 96374 THER/PROPH/DIAG INJ IV PUSH: CPT

## 2019-05-05 PROCEDURE — 80053 COMPREHEN METABOLIC PANEL: CPT

## 2019-05-05 PROCEDURE — 6360000002 HC RX W HCPCS: Performed by: INTERNAL MEDICINE

## 2019-05-05 PROCEDURE — 85025 COMPLETE CBC W/AUTO DIFF WBC: CPT

## 2019-05-05 PROCEDURE — 94640 AIRWAY INHALATION TREATMENT: CPT

## 2019-05-05 PROCEDURE — 36415 COLL VENOUS BLD VENIPUNCTURE: CPT

## 2019-05-05 PROCEDURE — 2580000003 HC RX 258: Performed by: INTERNAL MEDICINE

## 2019-05-05 PROCEDURE — 96376 TX/PRO/DX INJ SAME DRUG ADON: CPT

## 2019-05-05 RX ORDER — ISOSORBIDE MONONITRATE 30 MG/1
30 TABLET, EXTENDED RELEASE ORAL DAILY
Status: DISCONTINUED | OUTPATIENT
Start: 2019-05-06 | End: 2019-05-06 | Stop reason: HOSPADM

## 2019-05-05 RX ORDER — ALBUTEROL SULFATE 90 UG/1
2 AEROSOL, METERED RESPIRATORY (INHALATION) EVERY 6 HOURS PRN
Status: DISCONTINUED | OUTPATIENT
Start: 2019-05-05 | End: 2019-05-06 | Stop reason: HOSPADM

## 2019-05-05 RX ORDER — DULOXETIN HYDROCHLORIDE 60 MG/1
60 CAPSULE, DELAYED RELEASE ORAL DAILY
Status: DISCONTINUED | OUTPATIENT
Start: 2019-05-05 | End: 2019-05-06 | Stop reason: HOSPADM

## 2019-05-05 RX ORDER — CLOPIDOGREL BISULFATE 75 MG/1
75 TABLET ORAL DAILY
Status: DISCONTINUED | OUTPATIENT
Start: 2019-05-06 | End: 2019-05-06 | Stop reason: HOSPADM

## 2019-05-05 RX ORDER — OXYCODONE AND ACETAMINOPHEN 7.5; 325 MG/1; MG/1
1 TABLET ORAL EVERY 6 HOURS PRN
Status: DISCONTINUED | OUTPATIENT
Start: 2019-05-05 | End: 2019-05-06 | Stop reason: HOSPADM

## 2019-05-05 RX ORDER — METOPROLOL SUCCINATE 50 MG/1
50 TABLET, EXTENDED RELEASE ORAL DAILY
Status: DISCONTINUED | OUTPATIENT
Start: 2019-05-06 | End: 2019-05-06 | Stop reason: HOSPADM

## 2019-05-05 RX ORDER — TORSEMIDE 20 MG/1
20 TABLET ORAL DAILY
Status: DISCONTINUED | OUTPATIENT
Start: 2019-05-06 | End: 2019-05-06 | Stop reason: HOSPADM

## 2019-05-05 RX ORDER — MORPHINE SULFATE 2 MG/ML
2 INJECTION, SOLUTION INTRAMUSCULAR; INTRAVENOUS EVERY 4 HOURS PRN
Status: DISCONTINUED | OUTPATIENT
Start: 2019-05-05 | End: 2019-05-05

## 2019-05-05 RX ORDER — ONDANSETRON 2 MG/ML
4 INJECTION INTRAMUSCULAR; INTRAVENOUS ONCE
Status: COMPLETED | OUTPATIENT
Start: 2019-05-05 | End: 2019-05-05

## 2019-05-05 RX ORDER — SODIUM CHLORIDE 0.9 % (FLUSH) 0.9 %
10 SYRINGE (ML) INJECTION EVERY 12 HOURS SCHEDULED
Status: DISCONTINUED | OUTPATIENT
Start: 2019-05-05 | End: 2019-05-06 | Stop reason: HOSPADM

## 2019-05-05 RX ORDER — TIZANIDINE 4 MG/1
2 TABLET ORAL 2 TIMES DAILY PRN
Status: DISCONTINUED | OUTPATIENT
Start: 2019-05-05 | End: 2019-05-06 | Stop reason: HOSPADM

## 2019-05-05 RX ORDER — ROSUVASTATIN CALCIUM 10 MG/1
40 TABLET, COATED ORAL NIGHTLY
Status: DISCONTINUED | OUTPATIENT
Start: 2019-05-05 | End: 2019-05-06 | Stop reason: HOSPADM

## 2019-05-05 RX ORDER — HEPARIN SODIUM 10000 [USP'U]/100ML
18 INJECTION, SOLUTION INTRAVENOUS CONTINUOUS
Status: DISCONTINUED | OUTPATIENT
Start: 2019-05-05 | End: 2019-05-05

## 2019-05-05 RX ORDER — MORPHINE SULFATE 2 MG/ML
4 INJECTION, SOLUTION INTRAMUSCULAR; INTRAVENOUS ONCE
Status: COMPLETED | OUTPATIENT
Start: 2019-05-05 | End: 2019-05-05

## 2019-05-05 RX ORDER — ONDANSETRON 2 MG/ML
4 INJECTION INTRAMUSCULAR; INTRAVENOUS EVERY 6 HOURS PRN
Status: DISCONTINUED | OUTPATIENT
Start: 2019-05-05 | End: 2019-05-06 | Stop reason: HOSPADM

## 2019-05-05 RX ORDER — ONDANSETRON 4 MG/1
8 TABLET, FILM COATED ORAL EVERY 8 HOURS PRN
Status: DISCONTINUED | OUTPATIENT
Start: 2019-05-05 | End: 2019-05-06 | Stop reason: HOSPADM

## 2019-05-05 RX ORDER — DEXTROSE MONOHYDRATE 50 MG/ML
100 INJECTION, SOLUTION INTRAVENOUS PRN
Status: DISCONTINUED | OUTPATIENT
Start: 2019-05-05 | End: 2019-05-06 | Stop reason: HOSPADM

## 2019-05-05 RX ORDER — NITROGLYCERIN 0.4 MG/1
0.4 TABLET SUBLINGUAL EVERY 5 MIN PRN
Status: DISCONTINUED | OUTPATIENT
Start: 2019-05-05 | End: 2019-05-06 | Stop reason: HOSPADM

## 2019-05-05 RX ORDER — HEPARIN SODIUM 1000 [USP'U]/ML
40 INJECTION, SOLUTION INTRAVENOUS; SUBCUTANEOUS PRN
Status: DISCONTINUED | OUTPATIENT
Start: 2019-05-05 | End: 2019-05-05

## 2019-05-05 RX ORDER — SODIUM CHLORIDE 0.9 % (FLUSH) 0.9 %
10 SYRINGE (ML) INJECTION PRN
Status: DISCONTINUED | OUTPATIENT
Start: 2019-05-05 | End: 2019-05-06 | Stop reason: HOSPADM

## 2019-05-05 RX ORDER — ASPIRIN 81 MG/1
81 TABLET ORAL DAILY
Status: DISCONTINUED | OUTPATIENT
Start: 2019-05-06 | End: 2019-05-06 | Stop reason: HOSPADM

## 2019-05-05 RX ORDER — DEXTROSE MONOHYDRATE 25 G/50ML
12.5 INJECTION, SOLUTION INTRAVENOUS PRN
Status: DISCONTINUED | OUTPATIENT
Start: 2019-05-05 | End: 2019-05-06 | Stop reason: HOSPADM

## 2019-05-05 RX ORDER — MORPHINE SULFATE 2 MG/ML
1 INJECTION, SOLUTION INTRAMUSCULAR; INTRAVENOUS EVERY 4 HOURS PRN
Status: ACTIVE | OUTPATIENT
Start: 2019-05-05 | End: 2019-05-06

## 2019-05-05 RX ORDER — FAMOTIDINE 20 MG/1
20 TABLET, FILM COATED ORAL DAILY
Status: DISCONTINUED | OUTPATIENT
Start: 2019-05-06 | End: 2019-05-06 | Stop reason: HOSPADM

## 2019-05-05 RX ORDER — FAMOTIDINE 20 MG/1
20 TABLET, FILM COATED ORAL 2 TIMES DAILY
Status: DISCONTINUED | OUTPATIENT
Start: 2019-05-05 | End: 2019-05-05 | Stop reason: DRUGHIGH

## 2019-05-05 RX ORDER — NICOTINE POLACRILEX 4 MG
15 LOZENGE BUCCAL PRN
Status: DISCONTINUED | OUTPATIENT
Start: 2019-05-05 | End: 2019-05-06 | Stop reason: HOSPADM

## 2019-05-05 RX ORDER — QUETIAPINE FUMARATE 25 MG/1
25 TABLET, FILM COATED ORAL NIGHTLY
Status: DISCONTINUED | OUTPATIENT
Start: 2019-05-05 | End: 2019-05-06 | Stop reason: HOSPADM

## 2019-05-05 RX ORDER — PANTOPRAZOLE SODIUM 40 MG/1
40 TABLET, DELAYED RELEASE ORAL
Status: CANCELLED | OUTPATIENT
Start: 2019-05-06

## 2019-05-05 RX ORDER — TOPIRAMATE 100 MG/1
100 TABLET, FILM COATED ORAL 2 TIMES DAILY
Status: DISCONTINUED | OUTPATIENT
Start: 2019-05-05 | End: 2019-05-06 | Stop reason: HOSPADM

## 2019-05-05 RX ORDER — AMLODIPINE BESYLATE 5 MG/1
5 TABLET ORAL DAILY
Status: DISCONTINUED | OUTPATIENT
Start: 2019-05-06 | End: 2019-05-06 | Stop reason: HOSPADM

## 2019-05-05 RX ORDER — BUPROPION HYDROCHLORIDE 150 MG/1
150 TABLET ORAL EVERY MORNING
Status: DISCONTINUED | OUTPATIENT
Start: 2019-05-05 | End: 2019-05-06 | Stop reason: HOSPADM

## 2019-05-05 RX ORDER — RANOLAZINE 500 MG/1
1000 TABLET, EXTENDED RELEASE ORAL 2 TIMES DAILY
Status: DISCONTINUED | OUTPATIENT
Start: 2019-05-05 | End: 2019-05-06 | Stop reason: HOSPADM

## 2019-05-05 RX ORDER — INSULIN GLARGINE 100 [IU]/ML
40 INJECTION, SOLUTION SUBCUTANEOUS NIGHTLY
Status: DISCONTINUED | OUTPATIENT
Start: 2019-05-05 | End: 2019-05-06 | Stop reason: HOSPADM

## 2019-05-05 RX ORDER — HEPARIN SODIUM 1000 [USP'U]/ML
80 INJECTION, SOLUTION INTRAVENOUS; SUBCUTANEOUS PRN
Status: DISCONTINUED | OUTPATIENT
Start: 2019-05-05 | End: 2019-05-05

## 2019-05-05 RX ORDER — CHLORTHALIDONE 25 MG/1
25 TABLET ORAL DAILY
Status: DISCONTINUED | OUTPATIENT
Start: 2019-05-06 | End: 2019-05-06 | Stop reason: HOSPADM

## 2019-05-05 RX ORDER — HYDRALAZINE HYDROCHLORIDE 50 MG/1
50 TABLET, FILM COATED ORAL 2 TIMES DAILY
Status: DISCONTINUED | OUTPATIENT
Start: 2019-05-05 | End: 2019-05-06 | Stop reason: HOSPADM

## 2019-05-05 RX ORDER — MORPHINE SULFATE 2 MG/ML
2 INJECTION, SOLUTION INTRAMUSCULAR; INTRAVENOUS ONCE
Status: COMPLETED | OUTPATIENT
Start: 2019-05-05 | End: 2019-05-05

## 2019-05-05 RX ADMIN — OXYCODONE HYDROCHLORIDE AND ACETAMINOPHEN 1 TABLET: 7.5; 325 TABLET ORAL at 20:35

## 2019-05-05 RX ADMIN — BUPROPION HYDROCHLORIDE 150 MG: 150 TABLET, FILM COATED, EXTENDED RELEASE ORAL at 14:04

## 2019-05-05 RX ADMIN — MOMETASONE FUROATE AND FORMOTEROL FUMARATE DIHYDRATE 2 PUFF: 100; 5 AEROSOL RESPIRATORY (INHALATION) at 20:07

## 2019-05-05 RX ADMIN — NITROGLYCERIN 1 INCH: 20 OINTMENT TOPICAL at 14:04

## 2019-05-05 RX ADMIN — ONDANSETRON 4 MG: 2 INJECTION INTRAMUSCULAR; INTRAVENOUS at 10:37

## 2019-05-05 RX ADMIN — ONDANSETRON 4 MG: 2 INJECTION INTRAMUSCULAR; INTRAVENOUS at 15:55

## 2019-05-05 RX ADMIN — MORPHINE SULFATE 4 MG: 2 INJECTION, SOLUTION INTRAMUSCULAR; INTRAVENOUS at 09:41

## 2019-05-05 RX ADMIN — Medication 10 ML: at 20:37

## 2019-05-05 RX ADMIN — DULOXETINE HYDROCHLORIDE 60 MG: 60 CAPSULE, DELAYED RELEASE ORAL at 14:04

## 2019-05-05 RX ADMIN — TOPIRAMATE 100 MG: 100 TABLET, FILM COATED ORAL at 20:35

## 2019-05-05 RX ADMIN — ONDANSETRON 4 MG: 2 INJECTION INTRAMUSCULAR; INTRAVENOUS at 20:49

## 2019-05-05 RX ADMIN — NITROGLYCERIN 1 INCH: 20 OINTMENT TOPICAL at 09:41

## 2019-05-05 RX ADMIN — INSULIN GLARGINE 40 UNITS: 100 INJECTION, SOLUTION SUBCUTANEOUS at 20:43

## 2019-05-05 RX ADMIN — RANOLAZINE 1000 MG: 500 TABLET, FILM COATED, EXTENDED RELEASE ORAL at 20:35

## 2019-05-05 RX ADMIN — NITROGLYCERIN 1 INCH: 20 OINTMENT TOPICAL at 20:35

## 2019-05-05 RX ADMIN — OXYCODONE HYDROCHLORIDE AND ACETAMINOPHEN 1 TABLET: 7.5; 325 TABLET ORAL at 11:14

## 2019-05-05 RX ADMIN — INSULIN LISPRO 5 UNITS: 100 INJECTION, SOLUTION INTRAVENOUS; SUBCUTANEOUS at 17:32

## 2019-05-05 RX ADMIN — QUETIAPINE FUMARATE 25 MG: 25 TABLET ORAL at 20:35

## 2019-05-05 RX ADMIN — TOPIRAMATE 100 MG: 100 TABLET, FILM COATED ORAL at 14:04

## 2019-05-05 RX ADMIN — HYDRALAZINE HYDROCHLORIDE 50 MG: 50 TABLET, FILM COATED ORAL at 20:35

## 2019-05-05 RX ADMIN — ROSUVASTATIN CALCIUM 40 MG: 10 TABLET, FILM COATED ORAL at 20:35

## 2019-05-05 RX ADMIN — MORPHINE SULFATE 2 MG: 2 INJECTION, SOLUTION INTRAMUSCULAR; INTRAVENOUS at 14:04

## 2019-05-05 ASSESSMENT — PAIN DESCRIPTION - FREQUENCY
FREQUENCY: CONTINUOUS

## 2019-05-05 ASSESSMENT — PAIN SCALES - GENERAL
PAINLEVEL_OUTOF10: 8
PAINLEVEL_OUTOF10: 8
PAINLEVEL_OUTOF10: 6
PAINLEVEL_OUTOF10: 10
PAINLEVEL_OUTOF10: 4
PAINLEVEL_OUTOF10: 10
PAINLEVEL_OUTOF10: 10
PAINLEVEL_OUTOF10: 2
PAINLEVEL_OUTOF10: 6
PAINLEVEL_OUTOF10: 8

## 2019-05-05 ASSESSMENT — PAIN DESCRIPTION - LOCATION
LOCATION: CHEST

## 2019-05-05 ASSESSMENT — PAIN DESCRIPTION - ORIENTATION
ORIENTATION: MID;LEFT
ORIENTATION: LEFT;MID

## 2019-05-05 ASSESSMENT — PAIN DESCRIPTION - PAIN TYPE
TYPE: ACUTE PAIN

## 2019-05-05 ASSESSMENT — PAIN DESCRIPTION - DESCRIPTORS
DESCRIPTORS: PRESSURE

## 2019-05-05 ASSESSMENT — PAIN DESCRIPTION - ONSET
ONSET: SUDDEN

## 2019-05-05 ASSESSMENT — PAIN - FUNCTIONAL ASSESSMENT: PAIN_FUNCTIONAL_ASSESSMENT: ACTIVITIES ARE NOT PREVENTED

## 2019-05-05 ASSESSMENT — PAIN DESCRIPTION - PROGRESSION: CLINICAL_PROGRESSION: NOT CHANGED

## 2019-05-05 NOTE — H&P
Hospital Medicine History & Physical      PCP: Lashell Kaur MD    Date of Admission: 5/5/2019    Date of Service: Pt seen/examined on 5/5 and Placed in Observation. Chief Complaint:  Chest pain      History Of Present Illness:     59 y.o. female with PMH of CAD s/p PCI, diastolic CHF?, CKD, PAF, COPD, DM, fibromyalgia, GERD, htn, hld who presents with chest pain. Start symptoms started since yesterday. Began having intermittent L substernal chest pressure with persistent worsening. Today, pt took nitro with no significant improvement. Associated with mild dyspnea and now with some calf pain. Not exacerbated with movement or TTP. Denies n/v, fever, chills, uri symptoms, gross bleeding, LE erythema or swelling.     Past Medical History:          Diagnosis Date    Acid reflux     Anemia     Anxiety     Arthritis     Asthma     Atrial fibrillation ([facility])     Blood transfusion reaction     CAD (coronary artery disease) 12/3/2012    CHF (congestive heart failure) ([facility])     Chronic kidney disease     40% kidney functiom    COPD (chronic obstructive pulmonary disease) ([facility])     Depression     DM2 (diabetes mellitus, type 2) ([facility])     Dysarthria     Fibromyalgia 6/7/2016    Headache(784.0) 2/19/2013    Hemisensory loss     Hyperlipidemia     Hypertension     IBS (irritable bowel syndrome)     Inferior vena cava occlusion ([facility])     Irritable bowel syndrome     Keratitis     Neuropathy     Superior vena cava obstruction     Temporal arteritis ([facility] [address] 2/24/2014       Past Surgical History:          Procedure Laterality Date    ABLATION OF DYSRHYTHMIC FOCUS      ARTERY BIOPSY Right 04/23/2014    RIGHT TEMPORAL ARTERY BIOPSY    CATARACT REMOVAL Bilateral     CHOLECYSTECTOMY      CORONARY ANGIOPLASTY WITH STENT PLACEMENT      CORONARY ANGIOPLASTY WITH STENT PLACEMENT      HYSTERECTOMY      JOINT REPLACEMENT Right     KNEE ARTHROSCOPY Right     KNEE SURGERY      right knee replacement    TUNNELED VENOUS PORT PLACEMENT      left thigh.  VASCULAR SURGERY         Medications Prior to Admission:      Prior to Admission medications    Medication Sig Start Date End Date Taking? Authorizing Provider   ondansetron (ZOFRAN) 8 MG tablet TAKE 1 TABLET BY MOUTH EVERY 8 HOURS AS NEEDED FOR NAUSEA OR VOMITING 4/22/19  Yes Marlene Tsang MD   insulin glargine (BASAGLAR KWIKPEN) 100 UNIT/ML injection pen Inject 60 Units into the skin nightly Inject 40 units in AM and 35 units in PM  Patient taking differently: Inject 40 Units into the skin nightly Inject 40 units in AM and 35 units in PM 4/12/19  Yes Marlene Tsang MD   RANEXA 1000 MG extended release tablet TAKE 1 TABLET BY MOUTH 2 TIMES DAILY 4/11/19  Yes Marlene Tsang MD   ASPIRIN LOW DOSE 81 MG EC tablet TAKE 1 TABLET BY MOUTH DA JULIEN 4/11/19  Yes Marlene Tsang MD   insulin aspart (NOVOLOG FLEXPEN) 100 UNIT/ML injection pen Inject 5-10 Units into the skin 3 times daily (before meals) 4/11/19  Yes Marlene Tsang MD   topiramate (TOPAMAX) 100 MG tablet Take 1 tablet by mouth 2 times daily 4/9/19  Yes Flakito Gill MD   buPROPion (WELLBUTRIN XL) 150 MG extended release tablet Take 1 tablet by mouth every morning 4/9/19  Yes Flakito Gill MD   DULoxetine (CYMBALTA) 60 MG extended release capsule Take 1 capsule by mouth daily 4/9/19  Yes Flakito Gill MD   oxyCODONE-acetaminophen (PERCOCET) 7.5-325 MG per tablet Take 1 tablet by mouth every 6 hours as needed for Pain (max 3-4 per day) for up to 28 days.  4/9/19 5/7/19 Yes Flakito Gill MD   QUEtiapine (SEROQUEL) 25 MG tablet Take 1-2 tablets by mouth nightly 4/9/19  Yes Flakito Gill MD   tiZANidine (ZANAFLEX) 4 MG tablet Take 1/2 by mouth in the am, take 1 tablet by mouth in the pm 4/9/19  Yes Flakito Gill MD   omeprazole (PRILOSEC) 20 MG delayed release capsule Take 1 capsule by mouth Daily 4/9/19  Yes Flakito Gill MD   metoprolol succinate (TOPROL XL) 50 MG extended release tablet TAKE 1 TABLET BY MOUTH DAILY 3/15/19  Yes Jamar Ragsdale MD   hydrALAZINE (APRESOLINE) 50 MG tablet Take 1 tablet by mouth 2 times daily 3/6/19  Yes Nitesh Mcfadden MD   torsemide (DEMADEX) 20 MG tablet Take 1 tablet by mouth daily 3/6/19  Yes Nitesh Mcfadden MD   isosorbide mononitrate (IMDUR) 30 MG extended release tablet Take 1 tablet by mouth daily 3/6/19  Yes Nitesh Mcfadden MD   amLODIPine (NORVASC) 10 MG tablet Take 0.5 tablets by mouth daily 3/6/19  Yes Nitesh Mcfadden MD   butalbital-aspirin-caffeine [facility]) -40 MG per capsule Take 1 capsule by mouth every 6 hours as needed for Headaches for up to 14 days. . 2/20/19 5/5/19 Yes Nitesh Mcfadden MD   rosuvastatin (CRESTOR) 40 MG tablet Take 1 tablet by mouth nightly 2/5/19  Yes Nitesh Mcfadden MD   nitroGLYCERIN (NITROSTAT) 0.4 MG SL tablet PLACE 1 TABLET UNDER THE TONGUE EVERY 5 MINUTES AS NEEDED FOR CHEST PAIN. 1/10/19  Yes Nitesh Mcfadden MD   budesonide-formoterol (SYMBICORT) 160-4.5 MCG/ACT AERO Inhale 2 puffs into the lungs daily  Patient taking differently: Inhale 2 puffs into the lungs daily as needed  11/7/18  Yes Nitesh Mcfadden MD   albuterol sulfate HFA (PROAIR HFA) 108 (90 Base) MCG/ACT inhaler Inhale 2 puffs into the lungs every 6 hours as needed for Wheezing 11/7/18  Yes Nitesh Mcfadden MD   chlorthalidone (HYGROTON) 25 MG tablet TAKE ONE TABLET BY MOUTH DAILY 10/12/18  Yes Oscar Bhandari MD   clopidogrel (PLAVIX) 75 MG tablet Take 1 tablet by mouth daily 8/13/18  Yes Nitesh Mcfadden MD   ranitidine (ZANTAC) 150 MG tablet Take 1 tablet by mouth 2 times daily as needed for Heartburn  Patient taking differently: Take 150 mg by mouth 2 times daily  8/13/18  Yes Nitesh Mcfadden MD   glucose monitoring kit (FREESTYLE) monitoring kit 1 each by Does not apply route once for 1 dose May dispense glucometer of patients preference along with compatible strips.  3/6/19 3/6/19  Nitesh Mcfadden MD   Continuous Blood Value Date    NITRU Negative 07/21/2017    WBCUA 6 07/21/2017    BACTERIA 1+ 07/21/2017    RBCUA 1 07/21/2017    BLOODU Negative 07/21/2017    SPECGRAV 1.016 07/21/2017    GLUCOSEU 500 07/21/2017    GLUCOSEU NEGATIVE 05/14/2012         ASSESSMENT:    There are no active hospital problems to display for this patient. Chest pain; rule out ACS. High risk. Hx of CAD s/p PCI x3. Last cath in 12/18 with 70% stenosis distal LAD. Trop x1 and EKG unremarkable for acute ischemia. - serial trops  - monitor tele  - prn nitro and morphine  - already on ASA, plavix, statin  - consider heparin GTT  - cardio consulted    LE bilat calf pain. No gross erythema  - check d-dimer  - check bilat LE US  - if positive will need v/q    Chronic kidney disease. Baseline Cr 1.2-1.5  - trend lytes and Cr    PMH of diastolic CHF?, PAF, COPD, DM, fibromyalgia, GERD, htn, hld    DVT Prophylaxis: lovenox  Diet: No diet orders on file  Code Status: Prior    Dispo - 1-2 days, obs       Leon Berry MD    Thank you Milo Man MD for the opportunity to be involved in this patient's care.

## 2019-05-05 NOTE — ED NOTES
Report called to [facility].  VSS. Patient awaiting transport to room. Patient rates pain 8/10 at this time. Patient medicated for approx 10 min ago. No further needs at this time.      Montserrat Sparrow RN  05/05/19 5094

## 2019-05-05 NOTE — ED PROVIDER NOTES
eMERGENCY dEPARTMENT eNCOUnter      Pt Name: Devin Subramanian  MRN: [id number]  [place] 1955  Date of evaluation: 5/5/2019  Provider: China Carmichael MD     [address] [place] [address]       Chief Complaint   Patient presents with    Chest Pain     Patient states she has had chest pain with sob x 2 hours. Patient denies cough. Patient states she had chest pain last night as well but took nitro and the pain was relieved. HISTORY OF PRESENT ILLNESS   (Location/Symptom, Timing/Onset,Context/Setting, Quality, Duration, Modifying Factors, Severity) Note limiting factors. HPI    Devin Subramanian is a 59 y.o. female who presents to the emergency department with chest pain in the substernal area for day. Patient states it started yesterday but relieved with nitroglycerin. Patient has history of coronary artery disease with 3 stents several years ago. Patient is a diabetic on control. Also has history of hypertension. Patient did take all her medications this morning. It included aspirin. Patient states she took 3 nitros today when the chest pain returned and it did not subside. Patient also presents with shortness of breath. Patient denies any nausea vomiting. Patient describes the pain as a pressure sensation. She states there was radiation to the left arm. Nursing Notes were reviewed. REVIEW OFSYSTEMS    (2+ for level 4; 10+ for level 5)   Review of Systems    General: No fevers, chills or night sweats, No weight loss    Head:  No Sore throat,  No Ear Pain    Chest:  Nontender. No Cough, No SOB,  positive Chest Pain    GI: No abdominal pain or vomiting    : No dysuria or hematuria    Musculoskeletal: No unrelenting pain or night pain    Neurologic: No bowel or bladder incontinence, No saddle anesthesia, No leg weakness    All other systems reviewed and are negative.         PAST MEDICAL HISTORY     Past Medical History:   Diagnosis Date    Acid reflux     Anemia     Anxiety     Arthritis     DAILY    CLOPIDOGREL (PLAVIX) 75 MG TABLET    Take 1 tablet by mouth daily    CONTINUOUS BLOOD GLUC  (FREESTYLE LISSETT 14 DAY READER) DAYO    TEST THREE TIMES DAILY  250.00  E11.9    CONTINUOUS BLOOD GLUC SENSOR (FREESTYLE LISSETT 14 DAY SENSOR) MISC    Test three times daily   250.00 E 11.9    DULOXETINE (CYMBALTA) 60 MG EXTENDED RELEASE CAPSULE    Take 1 capsule by mouth daily    GLUCOSE MONITORING KIT (FREESTYLE) MONITORING KIT    1 each by Does not apply route once for 1 dose May dispense glucometer of patients preference along with compatible strips. HYDRALAZINE (APRESOLINE) 50 MG TABLET    Take 1 tablet by mouth 2 times daily    INSULIN ASPART (NOVOLOG FLEXPEN) 100 UNIT/ML INJECTION PEN    Inject 5-10 Units into the skin 3 times daily (before meals)    INSULIN GLARGINE (BASAGLAR KWIKPEN) 100 UNIT/ML INJECTION PEN    Inject 60 Units into the skin nightly Inject 40 units in AM and 35 units in PM    ISOSORBIDE MONONITRATE (IMDUR) 30 MG EXTENDED RELEASE TABLET    Take 1 tablet by mouth daily    METOPROLOL SUCCINATE (TOPROL XL) 50 MG EXTENDED RELEASE TABLET    TAKE 1 TABLET BY MOUTH DAILY    NITROGLYCERIN (NITROSTAT) 0.4 MG SL TABLET    PLACE 1 TABLET UNDER THE TONGUE EVERY 5 MINUTES AS NEEDED FOR CHEST PAIN. OMEPRAZOLE (PRILOSEC) 20 MG DELAYED RELEASE CAPSULE    Take 1 capsule by mouth Daily    ONDANSETRON (ZOFRAN) 8 MG TABLET    TAKE 1 TABLET BY MOUTH EVERY 8 HOURS AS NEEDED FOR NAUSEA OR VOMITING    OXYCODONE-ACETAMINOPHEN (PERCOCET) 7.5-325 MG PER TABLET    Take 1 tablet by mouth every 6 hours as needed for Pain (max 3-4 per day) for up to 28 days.     QUETIAPINE (SEROQUEL) 25 MG TABLET    Take 1-2 tablets by mouth nightly    RANEXA 1000 MG EXTENDED RELEASE TABLET    TAKE 1 TABLET BY MOUTH 2 TIMES DAILY    RANITIDINE (ZANTAC) 150 MG TABLET    Take 1 tablet by mouth 2 times daily as needed for Heartburn    ROSUVASTATIN (CRESTOR) 40 MG TABLET    Take 1 tablet by mouth nightly    TIZANIDINE (ZANAFLEX) 4 MG TABLET    Take 1/2 by mouth in the am, take 1 tablet by mouth in the pm    TOPIRAMATE (TOPAMAX) 100 MG TABLET    Take 1 tablet by mouth 2 times daily    TORSEMIDE (DEMADEX) 20 MG TABLET    Take 1 tablet by mouth daily    ULTICARE MINI PEN NEEDLES 31G X 6 MM MISC    USE THREE TIMES A DAY       ALLERGIES     Patient has no known allergies. FAMILY HISTORY       Family History   Problem Relation Age of Onset    Diabetes Mother     Hypertension Mother     High Cholesterol Mother     Stroke Mother     Cancer Mother         SOCIAL HISTORY       Social History     Socioeconomic History    Marital status:       Spouse name: None    Number of children: 6    Years of education: None    Highest education level: None   Occupational History    None   Social Needs    Financial resource strain: None    Food insecurity:     Worry: None     Inability: None    Transportation needs:     Medical: None     Non-medical: None   Tobacco Use    Smoking status: Former Smoker     Packs/day: 0.50     Years: 35.00     Pack years: 17.50     Types: Cigarettes     Last attempt to quit: 2018     Years since quittin.8    Smokeless tobacco: Never Used    Tobacco comment: 5/13/15 has not smoked since hospitalization - kh   Substance and Sexual Activity    Alcohol use: No     Alcohol/week: 0.0 oz    Drug use: No    Sexual activity: Yes     Partners: Male   Lifestyle    Physical activity:     Days per week: None     Minutes per session: None    Stress: None   Relationships    Social connections:     Talks on phone: None     Gets together: None     Attends Islam service: None     Active member of club or organization: None     Attends meetings of clubs or organizations: None     Relationship status: None    Intimate partner violence:     Fear of current or ex partner: None     Emotionally abused: None     Physically abused: None     Forced sexual activity: None   Other Topics Concern    None   Social History Narrative    None       SCREENINGS           PHYSICAL EXAM    (up to 7 for level 4, 8 or more for level 5)     ED Triage Vitals   BP Temp Temp src Pulse Resp SpO2 Height Weight   -- -- -- -- -- -- -- --       Physical Exam    General: Alert and awake ×3. Nontoxic appearance. Well-developed well-nourished thin 81-year-old black female in moderate respiratory distress. HEENT: Normocephalic atraumatic. Neck is supple. Airway intact. No adenopathy  Cardiac: Regular rate and rhythm with no murmurs rubs or gallops  Pulmonary: Lungs are clear in all lung fields. No wheezing. No Rales. Abdomen: Soft and nontender. Negative hepatosplenomegaly. Bowel sounds are active  Extremities: Moving all extremities. There is bilateral calf tenderness. No swelling noted. Peripheral pulses all intact  Skin: No skin lesions. No rashes  Neurologic: Cranial nerves II through XII was grossly intact. Nonfocal neurological exam  Psychiatric: Patient is pleasant. Mood is appropriate. DIAGNOSTIC RESULTS     EKG (Per Emergency Physician):       Normal sinus rhythm with left atrial enlargement age-indeterminate septal infarct. Ventricular rate is 89. Nothing ischemic at this time. RADIOLOGY (Per Emergency Physician): Interpretation per the Radiologist below, if available at the time of this note:  Xr Chest Portable    Result Date: 5/5/2019  EXAMINATION: [address] 5/5/2019 9:00 am COMPARISON: 01/05/2019 HISTORY: ORDERING SYSTEM PROVIDED HISTORY:  TECHNOLOGIST PROVIDED HISTORY: Reason for exam:->CP Ordering Physician Provided Reason for Exam: Chest Pain (Patient states she has had chest pain with sob x 2 hours. Patient denies cough. Patient states she had chest pain last night as well but took nitro and the pain was relieved. ) Acuity: Acute Type of Exam: Initial Relevant Medical/Surgical History: COPD diabetic hx of stents. FINDINGS: There is right apical scarring.   The cardiac silhouette is within normal limits. There is no pneumothorax or pleural effusion. Status post cholecystectomy. 1.  No acute abnormality. ED BEDSIDE ULTRASOUND:   Performed by ED Physician - none    LABS:  Labs Reviewed   CBC WITH AUTO DIFFERENTIAL - Abnormal; Notable for the following components:       Result Value    RBC 3.61 (*)     Hemoglobin 11.9 (*)     .3 (*)     All other components within normal limits    Narrative:     Performed at:  [facility]  [address] [facility] [place] [address]   Phone [phone]   COMPREHENSIVE METABOLIC PANEL W/ REFLEX TO MG FOR LOW K - Abnormal; Notable for the following components:    Sodium 135 (*)     CO2 20 (*)     Glucose 191 (*)     BUN 25 (*)     CREATININE 1.4 (*)     GFR Non- 38 (*)     GFR African American 46 (*)     Albumin/Globulin Ratio 0.9 (*)     Alkaline Phosphatase 150 (*)     All other components within normal limits    Narrative:     Performed at:  [address] [employer] [address]   Phone [phone]   BRAIN NATRIURETIC PEPTIDE - Abnormal; Notable for the following components:    Pro- (*)     All other components within normal limits    Narrative:     Performed at:  [address] [facility] [place] [address]   Phone [phone]   TROPONIN    Narrative:     Performed at:  [address] [facility] [place] [address]   Phone [phone]        All other labs were within normal range or not returned as of this dictation.       Procedures      EMERGENCY DEPARTMENT COURSE and DIFFERENTIAL DIAGNOSIS/MDM:   Vitals:    Vitals:    05/05/19 0843 05/05/19 0930 05/05/19 0945 05/05/19 1000   BP: (!) 183/81 (!) 177/82 (!) 153/79 (!) 164/75   Pulse: 91 77 75 74   Resp: 15 13 13 15   Temp:       TempSrc:       SpO2: 100% 99% 100% 100%   Weight: 126 lb 15.8 oz (57.6 kg)      Height: 5' (1.524 m)          Medications   ondansetron (ZOFRAN) injection 4 mg (has no administration in time range)   morphine (PF) injection 4 mg (4 mg Intravenous Given 5/5/19 0941)   nitroglycerin (NITRO-BID) 2 % ointment 1 inch (1 inch Topical Given 5/5/19 0941)       MDM. Patient is a 70-year-old with chest pain for day. Patient has history of significant coronary artery disease. She had 3 stents placed several years ago and recent Rosea Level about 4-5 months ago she had a angiogram done by cardiology. Patient did rule out some chest pain this morning. On relief of her 3 nitroglycerin. Patient has supple sternal chest pain and nausea and vomiting. Some shortness of breath as well. EKG and enzymes are negative at this time. Would recommend ruling her out admission. We'll contact hospitalist.  Patient was treated with nitroglycerin and Nitropaste morphine Zofran. She is not a STEMI. REVAL:         CRITICAL CARE TIME   Total CriticalCare time was 0 minutes, excluding separately reportable procedures. There was a high probability of clinically significant/life threatening deterioration in the patient's condition which required my urgent intervention. CONSULTS:  IP CONSULT TO HOSPITALIST    PROCEDURES:  Unless otherwise noted below, none     [unfilled]    FINAL IMPRESSION      1. Unstable angina pectoris (HCC)          DISPOSITION/PLAN   DISPOSITION        PATIENT REFERRED TO:  No follow-up provider specified. DISCHARGE MEDICATIONS:  New Prescriptions    No medications on file          (Please note:  Portions of this note were completed with a voice recognition program.Efforts were made to edit the dictations but occasionally words and phrases are mis-transcribed.)  Form v2016. [id number].[id number]    WILLIAM SHIRLEY MD (electronically signed)  Emergency Medicine Provider        Lily Power MD  05/05/19 0812

## 2019-05-05 NOTE — PROGRESS NOTES
4 Eyes Skin Assessment     The patient is being assess for  Admission    I agree that 2 RN's have performed a thorough Head to Toe Skin Assessment on the patient. ALL assessment sites listed below have been assessed. Areas assessed by both nurses:   [x]   Head, Face, and Ears   [x]   Shoulders, Back, and Chest  [x]   Arms, Elbows, and Hands   [x]   Coccyx, Sacrum, and IschIum  [x]   Legs, Feet, and Heels        Does the Patient have Skin Breakdown?   No         Rakan Prevention initiated:  NA   Wound Care Orders initiated:  NA      [facility] nurse consulted for Pressure Injury (Stage 3,4, Unstageable, DTI, NWPT, and Complex wounds), New and Established Ostomies:  NA      Nurse 1 eSignature: Electronically signed by Eduardo Stewart RN on 5/5/19 at 12:21 PM    **SHARE this note so that the co-signing nurse is able to place an eSignature**    Nurse 2 eSignature: Electronically signed by Jin Gilliam RN on 5/5/19 at 5:58 PM

## 2019-05-05 NOTE — ED NOTES
IV attempted x 2 without success. Charge RN notified. ED medic at bedside to obtain labs.        Yeni Moore RN  05/05/19 0345

## 2019-05-06 VITALS
BODY MASS INDEX: 25.1 KG/M2 | HEART RATE: 62 BPM | RESPIRATION RATE: 16 BRPM | DIASTOLIC BLOOD PRESSURE: 55 MMHG | HEIGHT: 60 IN | TEMPERATURE: 97.5 F | OXYGEN SATURATION: 98 % | WEIGHT: 127.87 LBS | SYSTOLIC BLOOD PRESSURE: 94 MMHG

## 2019-05-06 LAB
ANION GAP SERPL CALCULATED.3IONS-SCNC: 13 MMOL/L (ref 3–16)
BUN BLDV-MCNC: 32 MG/DL (ref 7–20)
CALCIUM SERPL-MCNC: 9 MG/DL (ref 8.3–10.6)
CHLORIDE BLD-SCNC: 101 MMOL/L (ref 99–110)
CO2: 23 MMOL/L (ref 21–32)
CREAT SERPL-MCNC: 1.6 MG/DL (ref 0.6–1.2)
EKG ATRIAL RATE: 89 BPM
EKG DIAGNOSIS: NORMAL
EKG P AXIS: 85 DEGREES
EKG P-R INTERVAL: 126 MS
EKG Q-T INTERVAL: 364 MS
EKG QRS DURATION: 76 MS
EKG QTC CALCULATION (BAZETT): 442 MS
EKG R AXIS: 64 DEGREES
EKG T AXIS: 83 DEGREES
EKG VENTRICULAR RATE: 89 BPM
GFR AFRICAN AMERICAN: 39
GFR NON-AFRICAN AMERICAN: 33
GLUCOSE BLD-MCNC: 104 MG/DL (ref 70–99)
GLUCOSE BLD-MCNC: 197 MG/DL (ref 70–99)
GLUCOSE BLD-MCNC: 205 MG/DL (ref 70–99)
HCT VFR BLD CALC: 34 % (ref 36–48)
HEMOGLOBIN: 11.2 G/DL (ref 12–16)
MCH RBC QN AUTO: 33.1 PG (ref 26–34)
MCHC RBC AUTO-ENTMCNC: 32.8 G/DL (ref 31–36)
MCV RBC AUTO: 100.9 FL (ref 80–100)
PDW BLD-RTO: 14 % (ref 12.4–15.4)
PERFORMED ON: ABNORMAL
PERFORMED ON: ABNORMAL
PLATELET # BLD: 188 K/UL (ref 135–450)
PMV BLD AUTO: 8.5 FL (ref 5–10.5)
POTASSIUM REFLEX MAGNESIUM: 4.5 MMOL/L (ref 3.5–5.1)
RBC # BLD: 3.37 M/UL (ref 4–5.2)
SODIUM BLD-SCNC: 137 MMOL/L (ref 136–145)
WBC # BLD: 4.7 K/UL (ref 4–11)

## 2019-05-06 PROCEDURE — 94760 N-INVAS EAR/PLS OXIMETRY 1: CPT

## 2019-05-06 PROCEDURE — 6370000000 HC RX 637 (ALT 250 FOR IP): Performed by: INTERNAL MEDICINE

## 2019-05-06 PROCEDURE — 6360000002 HC RX W HCPCS: Performed by: INTERNAL MEDICINE

## 2019-05-06 PROCEDURE — 94640 AIRWAY INHALATION TREATMENT: CPT

## 2019-05-06 PROCEDURE — G0378 HOSPITAL OBSERVATION PER HR: HCPCS

## 2019-05-06 PROCEDURE — 94664 DEMO&/EVAL PT USE INHALER: CPT

## 2019-05-06 PROCEDURE — 96376 TX/PRO/DX INJ SAME DRUG ADON: CPT

## 2019-05-06 PROCEDURE — 93010 ELECTROCARDIOGRAM REPORT: CPT | Performed by: INTERNAL MEDICINE

## 2019-05-06 PROCEDURE — 80048 BASIC METABOLIC PNL TOTAL CA: CPT

## 2019-05-06 PROCEDURE — 2580000003 HC RX 258: Performed by: INTERNAL MEDICINE

## 2019-05-06 PROCEDURE — 36415 COLL VENOUS BLD VENIPUNCTURE: CPT

## 2019-05-06 PROCEDURE — 85027 COMPLETE CBC AUTOMATED: CPT

## 2019-05-06 RX ADMIN — TOPIRAMATE 100 MG: 100 TABLET, FILM COATED ORAL at 08:53

## 2019-05-06 RX ADMIN — ISOSORBIDE MONONITRATE 30 MG: 30 TABLET, EXTENDED RELEASE ORAL at 08:53

## 2019-05-06 RX ADMIN — INSULIN LISPRO 5 UNITS: 100 INJECTION, SOLUTION INTRAVENOUS; SUBCUTANEOUS at 08:52

## 2019-05-06 RX ADMIN — OXYCODONE HYDROCHLORIDE AND ACETAMINOPHEN 1 TABLET: 7.5; 325 TABLET ORAL at 13:20

## 2019-05-06 RX ADMIN — TORSEMIDE 20 MG: 20 TABLET ORAL at 08:53

## 2019-05-06 RX ADMIN — CLOPIDOGREL BISULFATE 75 MG: 75 TABLET ORAL at 08:53

## 2019-05-06 RX ADMIN — OXYCODONE HYDROCHLORIDE AND ACETAMINOPHEN 1 TABLET: 7.5; 325 TABLET ORAL at 05:08

## 2019-05-06 RX ADMIN — DULOXETINE HYDROCHLORIDE 60 MG: 60 CAPSULE, DELAYED RELEASE ORAL at 08:53

## 2019-05-06 RX ADMIN — CHLORTHALIDONE 25 MG: 25 TABLET ORAL at 08:53

## 2019-05-06 RX ADMIN — ONDANSETRON 4 MG: 2 INJECTION INTRAMUSCULAR; INTRAVENOUS at 05:08

## 2019-05-06 RX ADMIN — TIZANIDINE 2 MG: 4 TABLET ORAL at 09:01

## 2019-05-06 RX ADMIN — FAMOTIDINE 20 MG: 20 TABLET ORAL at 08:53

## 2019-05-06 RX ADMIN — NITROGLYCERIN 1 INCH: 20 OINTMENT TOPICAL at 05:08

## 2019-05-06 RX ADMIN — MOMETASONE FUROATE AND FORMOTEROL FUMARATE DIHYDRATE 2 PUFF: 100; 5 AEROSOL RESPIRATORY (INHALATION) at 08:51

## 2019-05-06 RX ADMIN — ASPIRIN 81 MG: 81 TABLET, COATED ORAL at 08:53

## 2019-05-06 RX ADMIN — RANOLAZINE 1000 MG: 500 TABLET, FILM COATED, EXTENDED RELEASE ORAL at 08:52

## 2019-05-06 RX ADMIN — ALBUTEROL SULFATE 2 PUFF: 90 AEROSOL, METERED RESPIRATORY (INHALATION) at 08:48

## 2019-05-06 RX ADMIN — BUPROPION HYDROCHLORIDE 150 MG: 150 TABLET, FILM COATED, EXTENDED RELEASE ORAL at 08:52

## 2019-05-06 RX ADMIN — Medication 10 ML: at 08:55

## 2019-05-06 ASSESSMENT — PAIN DESCRIPTION - DESCRIPTORS: DESCRIPTORS: PRESSURE

## 2019-05-06 ASSESSMENT — PAIN SCALES - GENERAL
PAINLEVEL_OUTOF10: 4
PAINLEVEL_OUTOF10: 6
PAINLEVEL_OUTOF10: 7
PAINLEVEL_OUTOF10: 5
PAINLEVEL_OUTOF10: 8

## 2019-05-06 ASSESSMENT — PAIN DESCRIPTION - PROGRESSION
CLINICAL_PROGRESSION: NOT CHANGED

## 2019-05-06 ASSESSMENT — PAIN DESCRIPTION - FREQUENCY: FREQUENCY: CONTINUOUS

## 2019-05-06 ASSESSMENT — PAIN DESCRIPTION - PAIN TYPE: TYPE: ACUTE PAIN

## 2019-05-06 ASSESSMENT — PAIN DESCRIPTION - ONSET: ONSET: SUDDEN

## 2019-05-06 ASSESSMENT — PAIN DESCRIPTION - ORIENTATION: ORIENTATION: LEFT;MID

## 2019-05-06 ASSESSMENT — PAIN - FUNCTIONAL ASSESSMENT: PAIN_FUNCTIONAL_ASSESSMENT: ACTIVITIES ARE NOT PREVENTED

## 2019-05-06 ASSESSMENT — PAIN DESCRIPTION - LOCATION: LOCATION: CHEST

## 2019-05-06 NOTE — PROGRESS NOTES
Pt has several scheduled BP medications and currently has a Nitro BID patch on. BP is 94/55 and HR is 66. Placed call Dr. Roc Smith office. Awaiting call back and subsequent orders.

## 2019-05-06 NOTE — PLAN OF CARE
Problem: Falls - Risk of:  Goal: Will remain free from falls  Description  Will remain free from falls  5/5/2019 2330 by Kris Adan RN  Outcome: Ongoing  5/5/2019 1209 by Eduardo Stewart RN  Outcome: Ongoing  Goal: Absence of physical injury  Description  Absence of physical injury  5/5/2019 2330 by Kris Adan RN  Outcome: Ongoing  5/5/2019 1209 by Eduardo Stewart RN  Outcome: Ongoing

## 2019-05-06 NOTE — CARE COORDINATION
INITIAL CASE MANAGEMENT ASSESSMENT    Reviewed chart, met with patient to assess possible discharge needs. Explained Case Management role/services. Living Situation: Patient lives with her daughter and grandchildren in a house with 1 ZE and 1 flight of steps to her bedroom. ADLs: Patient states that she has severe fibromyalgia and needs assistance with her ADL's. DME: [facility]    PT/OT Recs: not ordered     Active Services: [employer]Aide and homemaking services 5 hours/day x 5 days/week to assist with personal care and homemaking. Transportation: Daughter will transport to home     [facility]    PCP: Dr. Nrei Cohen. Theresa      HD/PD: n/a    PLAN/COMMENTS: Patient goal is return to home with her family support and COA services. SW/CM provided contact information for patient or family to call with any questions. SW/CM will follow and assist as needed. Electronically signed by Stoney Hernandez RN on 5/6/2019 at 2:10 PM

## 2019-05-06 NOTE — DISCHARGE SUMMARY
Hospital Medicine Discharge Summary    Patient ID: Kita Sage      Patient's PCP: Brianne Schaefer MD    Admit Date: 5/5/2019     Discharge Date: 5/6/2019    Admitting Physician: Sarah Marroquin MD     Discharge Physician: Antonieta Antonio MD     Discharge Diagnoses and Hospital Course: Active Hospital Problems    Diagnosis Date Noted    Chest pain [R07.9] 01/30/2014     Acute on chronic chest pain. Multiple caths and CT-As. Hx of CAD s/p PCI x3. Last cath in 12/18 with 70% stenosis distal LAD. - serial trops negative   - EKG without ischemic findings  - already on ASA, plavix, statin  - PRN nitro  - cardio consulted; will f/u as outpt     LE bilat calf pain. No gross erythema. Possibly neuropathy  - d-dimer negative     Chronic kidney disease. Baseline Cr 1.2-1.5. Stable     PMH of diastolic CHF?, PAF, COPD, DM, fibromyalgia, GERD, htn, hld     The patient was seen and examined on day of discharge and this discharge summary is in conjunction with any daily progress note from day of discharge. Exam:     BP (!) 94/55   Pulse 62   Temp 97.5 °F (36.4 °C) (Oral)   Resp 16   Ht 5' (1.524 m)   Wt 127 lb 13.9 oz (58 kg)   SpO2 98%   BMI 24.97 kg/m²     Gen/overall appearance: Not in acute distress. Alert. Head: Normocephalic, atraumatic  Eyes: EOMI, no scleral icterus  CVS: regular rate and rhythm, Normal S1S2  Pulm: Clear to auscultation bilaterally. No crackles/wheezes  Gastrointestinal: Soft, nontender, nondistended, no guarding or rebound  Extremities: No edema. No erythema or warmth  Neuro: No gross focal deficits noted  Skin: Warm, dry        Consults:     IP CONSULT TO HOSPITALIST  IP CONSULT TO CARDIOLOGY    Disposition:  home     Condition:  Stable    Discharge Instructions/Follow-up:   Pt to follow up with PCP within 1 week  Consultants as scheduled    Code Status:  Full Code    Activity: activity as tolerated    Diet: cardiac diet and diabetic diet    Labs:  For convenience and continuity at follow-up the following most recent labs are provided:      CBC:    Lab Results   Component Value Date    WBC 4.7 05/06/2019    HGB 11.2 05/06/2019    HCT 34.0 05/06/2019     05/06/2019       Renal:    Lab Results   Component Value Date     05/06/2019    K 4.5 05/06/2019     05/06/2019    CO2 23 05/06/2019    BUN 32 05/06/2019    CREATININE 1.6 05/06/2019    CALCIUM 9.0 05/06/2019    PHOS 3.7 05/09/2015       Discharge Medications:     Current Discharge Medication List           Details   ondansetron (ZOFRAN) 8 MG tablet TAKE 1 TABLET BY MOUTH EVERY 8 HOURS AS NEEDED FOR NAUSEA OR VOMITING  Qty: 90 tablet, Refills: 0    Associated Diagnoses: Nausea      insulin glargine (BASAGLAR KWIKPEN) 100 UNIT/ML injection pen Inject 60 Units into the skin nightly Inject 40 units in AM and 35 units in PM  Qty: 15 mL, Refills: 5      RANEXA 1000 MG extended release tablet TAKE 1 TABLET BY MOUTH 2 TIMES DAILY  Qty: 60 tablet, Refills: 3    Associated Diagnoses: Coronary artery disease involving native coronary artery of native heart without angina pectoris      ASPIRIN LOW DOSE 81 MG EC tablet TAKE 1 TABLET BY MOUTH DA JULIEN  Qty: 30 tablet, Refills: 3    Associated Diagnoses: Coronary artery disease involving native coronary artery of native heart without angina pectoris      insulin aspart (NOVOLOG FLEXPEN) 100 UNIT/ML injection pen Inject 5-10 Units into the skin 3 times daily (before meals)  Qty: 5 pen, Refills: 4    Associated Diagnoses: Uncontrolled type 2 diabetes mellitus with complication, with long-term current use of insulin ([facility])      topiramate (TOPAMAX) 100 MG tablet Take 1 tablet by mouth 2 times daily  Qty: 60 tablet, Refills: 1    Associated Diagnoses: Chronic pain syndrome      buPROPion (WELLBUTRIN XL) 150 MG extended release tablet Take 1 tablet by mouth every morning  Qty: 30 tablet, Refills: 1    Associated Diagnoses: Chronic pain syndrome      DULoxetine (CYMBALTA) 60 MG extended release capsule Take 1 capsule by mouth daily  Qty: 30 capsule, Refills: 0    Associated Diagnoses: Chronic pain syndrome      oxyCODONE-acetaminophen (PERCOCET) 7.5-325 MG per tablet Take 1 tablet by mouth every 6 hours as needed for Pain (max 3-4 per day) for up to 28 days. Qty: 100 tablet, Refills: 0    Comments: Reduce doses taken as pain becomes manageable  Associated Diagnoses: Chronic pain syndrome; Chronic painful diabetic neuropathy ([facility] [address] Fibromyalgia      QUEtiapine (SEROQUEL) 25 MG tablet Take 1-2 tablets by mouth nightly  Qty: 60 tablet, Refills: 0      tiZANidine (ZANAFLEX) 4 MG tablet Take 1/2 by mouth in the am, take 1 tablet by mouth in the pm  Qty: 60 tablet, Refills: 0      omeprazole (PRILOSEC) 20 MG delayed release capsule Take 1 capsule by mouth Daily  Qty: 30 capsule, Refills: 0      metoprolol succinate (TOPROL XL) 50 MG extended release tablet TAKE 1 TABLET BY MOUTH DAILY  Qty: 30 tablet, Refills: 3      hydrALAZINE (APRESOLINE) 50 MG tablet Take 1 tablet by mouth 2 times daily  Qty: 60 tablet, Refills: 5    Associated Diagnoses: Essential hypertension      torsemide (DEMADEX) 20 MG tablet Take 1 tablet by mouth daily  Qty: 30 tablet, Refills: 5    Associated Diagnoses: Essential hypertension; Coronary artery disease involving native coronary artery of native heart without angina pectoris      isosorbide mononitrate (IMDUR) 30 MG extended release tablet Take 1 tablet by mouth daily  Qty: 30 tablet, Refills: 5    Associated Diagnoses: Essential hypertension; Coronary artery disease involving native coronary artery of native heart without angina pectoris      amLODIPine (NORVASC) 10 MG tablet Take 0.5 tablets by mouth daily  Qty: 30 tablet, Refills: 5    Associated Diagnoses: Essential hypertension      butalbital-aspirin-caffeine (FIORINAL) -40 MG per capsule Take 1 capsule by mouth every 6 hours as needed for Headaches for up to 14 days. Russ Dust: 30 capsule, Refills: 0 Associated Diagnoses: Other headache syndrome      rosuvastatin (CRESTOR) 40 MG tablet Take 1 tablet by mouth nightly  Qty: 90 tablet, Refills: 5    Associated Diagnoses: Uncontrolled type 2 diabetes mellitus with complication, with long-term current use of insulin ([facility] [address] Coronary artery disease involving native coronary artery of native heart without angina pectoris; Other hyperlipidemia      nitroGLYCERIN (NITROSTAT) 0.4 MG SL tablet PLACE 1 TABLET UNDER THE TONGUE EVERY 5 MINUTES AS NEEDED FOR CHEST PAIN. Qty: 25 tablet, Refills: 2      budesonide-formoterol (SYMBICORT) 160-4.5 MCG/ACT AERO Inhale 2 puffs into the lungs daily  Qty: 11 g, Refills: 4    Associated Diagnoses: COPD with acute bronchitis (HCC)      albuterol sulfate HFA (PROAIR HFA) 108 (90 Base) MCG/ACT inhaler Inhale 2 puffs into the lungs every 6 hours as needed for Wheezing  Qty: 1 Inhaler, Refills: 5    Associated Diagnoses: COPD with acute bronchitis (HCC)      chlorthalidone (HYGROTON) 25 MG tablet TAKE ONE TABLET BY MOUTH DAILY  Qty: 90 tablet, Refills: 0      clopidogrel (PLAVIX) 75 MG tablet Take 1 tablet by mouth daily  Qty: 90 tablet, Refills: 5    Associated Diagnoses: Coronary artery disease involving native coronary artery of native heart without angina pectoris      ranitidine (ZANTAC) 150 MG tablet Take 1 tablet by mouth 2 times daily as needed for Heartburn  Qty: 60 tablet, Refills: 3    Associated Diagnoses: Gastroesophageal reflux disease without esophagitis      glucose monitoring kit (FREESTYLE) monitoring kit 1 each by Does not apply route once for 1 dose May dispense glucometer of patients preference along with compatible strips.   Qty: 1 kit, Refills: 1      Continuous Blood Gluc  (FREESTYLE LISSETT 14 DAY READER) DAYO TEST THREE TIMES DAILY  250.00  E11.9  Qty: 1 Device, Refills: 0    Associated Diagnoses: Uncontrolled type 2 diabetes mellitus with complication, with long-term current use of insulin ([facility])

## 2019-05-07 ENCOUNTER — TELEPHONE (OUTPATIENT)
Dept: PAIN MANAGEMENT | Age: 64
End: 2019-05-07

## 2019-05-07 DIAGNOSIS — E11.40 CHRONIC PAINFUL DIABETIC NEUROPATHY (HCC): ICD-10-CM

## 2019-05-07 DIAGNOSIS — G89.4 CHRONIC PAIN SYNDROME: ICD-10-CM

## 2019-05-07 DIAGNOSIS — M79.7 FIBROMYALGIA: ICD-10-CM

## 2019-05-07 RX ORDER — DULOXETIN HYDROCHLORIDE 60 MG/1
60 CAPSULE, DELAYED RELEASE ORAL DAILY
Qty: 30 CAPSULE | Refills: 0 | Status: SHIPPED | OUTPATIENT
Start: 2019-05-07 | End: 2019-05-29 | Stop reason: SDUPTHER

## 2019-05-07 RX ORDER — OMEPRAZOLE 20 MG/1
20 CAPSULE, DELAYED RELEASE ORAL DAILY
Qty: 30 CAPSULE | Refills: 0 | Status: SHIPPED | OUTPATIENT
Start: 2019-05-07 | End: 2019-05-29 | Stop reason: SDUPTHER

## 2019-05-07 RX ORDER — OXYCODONE AND ACETAMINOPHEN 7.5; 325 MG/1; MG/1
1 TABLET ORAL EVERY 6 HOURS PRN
Refills: 0 | Status: CANCELLED | OUTPATIENT
Start: 2019-05-07 | End: 2019-05-29

## 2019-05-07 RX ORDER — QUETIAPINE FUMARATE 25 MG/1
25-50 TABLET, FILM COATED ORAL NIGHTLY
Qty: 60 TABLET | Refills: 0 | Status: SHIPPED | OUTPATIENT
Start: 2019-05-07 | End: 2019-05-29 | Stop reason: SDUPTHER

## 2019-05-07 NOTE — TELEPHONE ENCOUNTER
OK to give patient, patient will call when being discharged. As of 5/7/19, next avail appointment is on 5/29/19 (that patient can make). Please adjust her quantity to match the dates/time frame she will need.  End date has been adjusted thru 5/29/19

## 2019-05-08 DIAGNOSIS — E11.40 CHRONIC PAINFUL DIABETIC NEUROPATHY (HCC): ICD-10-CM

## 2019-05-08 DIAGNOSIS — M79.7 FIBROMYALGIA: ICD-10-CM

## 2019-05-08 DIAGNOSIS — G89.4 CHRONIC PAIN SYNDROME: ICD-10-CM

## 2019-05-08 RX ORDER — OXYCODONE AND ACETAMINOPHEN 7.5; 325 MG/1; MG/1
1 TABLET ORAL EVERY 6 HOURS PRN
Qty: 84 TABLET | Refills: 0 | Status: SHIPPED | OUTPATIENT
Start: 2019-05-08 | End: 2019-05-29 | Stop reason: SDUPTHER

## 2019-05-08 NOTE — TELEPHONE ENCOUNTER
Pt calling, was discharged and wants to get an Rx today from [facility].  She states she will be sending her nephew to get it

## 2019-05-15 ENCOUNTER — HOSPITAL ENCOUNTER (EMERGENCY)
Age: 64
Discharge: HOME | End: 2019-05-15
Attending: EMERGENCY MEDICINE
Payer: COMMERCIAL

## 2019-05-15 ENCOUNTER — APPOINTMENT (OUTPATIENT)
Dept: GENERAL RADIOLOGY | Age: 64
End: 2019-05-15
Payer: COMMERCIAL

## 2019-05-15 VITALS
BODY MASS INDEX: 24.15 KG/M2 | SYSTOLIC BLOOD PRESSURE: 173 MMHG | WEIGHT: 123 LBS | TEMPERATURE: 98.5 F | OXYGEN SATURATION: 98 % | HEIGHT: 60 IN | DIASTOLIC BLOOD PRESSURE: 98 MMHG | HEART RATE: 100 BPM | RESPIRATION RATE: 16 BRPM

## 2019-05-15 DIAGNOSIS — R07.9 ACUTE CHEST PAIN: Primary | ICD-10-CM

## 2019-05-15 LAB
A/G RATIO: 1 (ref 1.1–2.2)
ALBUMIN SERPL-MCNC: 4 G/DL (ref 3.4–5)
ALP BLD-CCNC: 135 U/L (ref 40–129)
ALT SERPL-CCNC: 14 U/L (ref 10–40)
ANION GAP SERPL CALCULATED.3IONS-SCNC: 16 MMOL/L (ref 3–16)
AST SERPL-CCNC: 24 U/L (ref 15–37)
BILIRUB SERPL-MCNC: 0.4 MG/DL (ref 0–1)
BUN BLDV-MCNC: 29 MG/DL (ref 7–20)
CALCIUM SERPL-MCNC: 9.9 MG/DL (ref 8.3–10.6)
CHLORIDE BLD-SCNC: 104 MMOL/L (ref 99–110)
CO2: 20 MMOL/L (ref 21–32)
CREAT SERPL-MCNC: 1.2 MG/DL (ref 0.6–1.2)
GFR AFRICAN AMERICAN: 55
GFR NON-AFRICAN AMERICAN: 45
GLOBULIN: 4.1 G/DL
GLUCOSE BLD-MCNC: 197 MG/DL (ref 70–99)
POTASSIUM REFLEX MAGNESIUM: 4.1 MMOL/L (ref 3.5–5.1)
SODIUM BLD-SCNC: 140 MMOL/L (ref 136–145)
TOTAL PROTEIN: 8.1 G/DL (ref 6.4–8.2)
TROPONIN: <0.01 NG/ML
TROPONIN: <0.01 NG/ML

## 2019-05-15 PROCEDURE — 36415 COLL VENOUS BLD VENIPUNCTURE: CPT

## 2019-05-15 PROCEDURE — 84484 ASSAY OF TROPONIN QUANT: CPT

## 2019-05-15 PROCEDURE — 80053 COMPREHEN METABOLIC PANEL: CPT

## 2019-05-15 PROCEDURE — 6360000002 HC RX W HCPCS: Performed by: EMERGENCY MEDICINE

## 2019-05-15 PROCEDURE — 6370000000 HC RX 637 (ALT 250 FOR IP): Performed by: EMERGENCY MEDICINE

## 2019-05-15 PROCEDURE — 99285 EMERGENCY DEPT VISIT HI MDM: CPT

## 2019-05-15 PROCEDURE — 96372 THER/PROPH/DIAG INJ SC/IM: CPT

## 2019-05-15 PROCEDURE — 93005 ELECTROCARDIOGRAM TRACING: CPT | Performed by: EMERGENCY MEDICINE

## 2019-05-15 PROCEDURE — 71045 X-RAY EXAM CHEST 1 VIEW: CPT

## 2019-05-15 RX ORDER — MORPHINE SULFATE 4 MG/ML
4 INJECTION, SOLUTION INTRAMUSCULAR; INTRAVENOUS ONCE
Status: COMPLETED | OUTPATIENT
Start: 2019-05-15 | End: 2019-05-15

## 2019-05-15 RX ORDER — MORPHINE SULFATE 10 MG/ML
4 INJECTION, SOLUTION INTRAMUSCULAR; INTRAVENOUS ONCE
Status: COMPLETED | OUTPATIENT
Start: 2019-05-15 | End: 2019-05-15

## 2019-05-15 RX ORDER — ONDANSETRON 4 MG/1
4 TABLET, ORALLY DISINTEGRATING ORAL ONCE
Status: COMPLETED | OUTPATIENT
Start: 2019-05-15 | End: 2019-05-15

## 2019-05-15 RX ORDER — ASPIRIN 81 MG/1
324 TABLET, CHEWABLE ORAL ONCE
Status: COMPLETED | OUTPATIENT
Start: 2019-05-15 | End: 2019-05-15

## 2019-05-15 RX ADMIN — ASPIRIN 81 MG 324 MG: 81 TABLET ORAL at 17:22

## 2019-05-15 RX ADMIN — MORPHINE SULFATE 4 MG: 4 INJECTION INTRAVENOUS at 17:22

## 2019-05-15 RX ADMIN — ONDANSETRON 4 MG: 4 TABLET, ORALLY DISINTEGRATING ORAL at 19:08

## 2019-05-15 RX ADMIN — MORPHINE SULFATE 4 MG: 10 INJECTION, SOLUTION INTRAMUSCULAR; INTRAVENOUS at 18:51

## 2019-05-15 RX ADMIN — NITROGLYCERIN 1 INCH: 20 OINTMENT TOPICAL at 17:22

## 2019-05-15 ASSESSMENT — PAIN DESCRIPTION - ORIENTATION: ORIENTATION: MID

## 2019-05-15 ASSESSMENT — PAIN SCALES - GENERAL
PAINLEVEL_OUTOF10: 8
PAINLEVEL_OUTOF10: 6
PAINLEVEL_OUTOF10: 10
PAINLEVEL_OUTOF10: 6
PAINLEVEL_OUTOF10: 8
PAINLEVEL_OUTOF10: 10

## 2019-05-15 ASSESSMENT — PAIN DESCRIPTION - DESCRIPTORS: DESCRIPTORS: TIGHTNESS

## 2019-05-15 ASSESSMENT — PAIN - FUNCTIONAL ASSESSMENT: PAIN_FUNCTIONAL_ASSESSMENT: ADVANCED DEMENTIA

## 2019-05-15 ASSESSMENT — PAIN DESCRIPTION - PROGRESSION: CLINICAL_PROGRESSION: NOT CHANGED

## 2019-05-15 ASSESSMENT — HEART SCORE: ECG: 1

## 2019-05-15 ASSESSMENT — PAIN DESCRIPTION - FREQUENCY: FREQUENCY: CONTINUOUS

## 2019-05-15 ASSESSMENT — PAIN DESCRIPTION - LOCATION: LOCATION: CHEST

## 2019-05-15 NOTE — ED PROVIDER NOTES
interpretation by myself: Sinus rhyttm sinus rhythm rate 74 axis normal MO normal QRS narrow ST segments with nonspecific ST segment changes unchanged from EKG done earlier today. Final Impression:  1.  Acute chest pain      DISPOSITION Decision To Discharge 05/15/2019 06:14:46 PM      Patient referred to:  Shelley Laird, [address]  [phone]    In 1 week      Ajay Whitaker, [address] [facility] [address]     In 1 day      Discharge medications:  New Prescriptions    No medications on file     (Please note that portions of this note may have been completed with a voice recognition program. Efforts were made to edit the dictations but occasionally words are mis-transcribed.)    Mar Booth DO,   Board certified in Emergency Medicine  [address] [place] [address],   05/15/19 3025

## 2019-05-15 NOTE — ED PROVIDER NOTES
CHIEF COMPLAINT  Chief Complaint   Patient presents with    Chest Pain     Pt sitting in chair 2 hrs ago and started with mid sternal chest pain radiating to left arm. Pt nauseated. HISTORY OF PRESENT ILLNESS  Florencio Alexis is a 59 y.o. female who presents to the ED complaining of onset of left-sided chest pain associated with mild shortness of breath typical for her usual exacerbations of angina with a recent hospitalization on May 5 for similar symptoms. Patient's last cardiac catheterization was in December 2018 that revealed a distal LAD stenosis of 70%. Apparently this is outside of the range of intervention and patient has been managed medically. Patient denies presyncope although she does report palpitations and rapid heart rate. No back or jaw pain. No neck pain. She reports nausea without vomiting. No lower extremity edema. No calf pain. Patient had a recent d-dimer within the last week that was negative. No history of DVT. No other complaints, modifying factors or associated symptoms. Nursing notes reviewed.    Past Medical History:   Diagnosis Date    Acid reflux     Anemia     Anxiety     Arthritis     Asthma     Atrial fibrillation ([facility])     Blood transfusion reaction     CAD (coronary artery disease) 12/3/2012    CHF (congestive heart failure) ([facility])     Chronic kidney disease     40% kidney functiom    COPD (chronic obstructive pulmonary disease) ([facility])     Depression     DM2 (diabetes mellitus, type 2) ([facility])     Dysarthria     Fibromyalgia 6/7/2016    Headache(784.0) 2/19/2013    Hemisensory loss     Hyperlipidemia     Hypertension     IBS (irritable bowel syndrome)     Inferior vena cava occlusion (HCC)     Irritable bowel syndrome     Keratitis     Neuropathy     Superior vena cava obstruction     Temporal arteritis ([facility] [address] 2/24/2014     Past Surgical History:   Procedure Laterality Date    ABLATION OF DYSRHYTHMIC FOCUS      ARTERY BIOPSY Right 2014    RIGHT TEMPORAL ARTERY BIOPSY    CATARACT REMOVAL Bilateral     CHOLECYSTECTOMY      CORONARY ANGIOPLASTY WITH STENT PLACEMENT      CORONARY ANGIOPLASTY WITH STENT PLACEMENT      HYSTERECTOMY      JOINT REPLACEMENT Right     KNEE ARTHROSCOPY Right     KNEE SURGERY      right knee replacement    TUNNELED VENOUS PORT PLACEMENT      left thigh.  VASCULAR SURGERY       Family History   Problem Relation Age of Onset    Diabetes Mother     Hypertension Mother     High Cholesterol Mother     Stroke Mother     Cancer Mother      Social History     Socioeconomic History    Marital status:       Spouse name: Not on file    Number of children: 6    Years of education: Not on file    Highest education level: Not on file   Occupational History    Not on file   Social Needs    Financial resource strain: Not on file    Food insecurity:     Worry: Not on file     Inability: Not on file    Transportation needs:     Medical: Not on file     Non-medical: Not on file   Tobacco Use    Smoking status: Former Smoker     Packs/day: 0.50     Years: 35.00     Pack years: 17.50     Types: Cigarettes     Last attempt to quit: 2018     Years since quittin.8    Smokeless tobacco: Never Used    Tobacco comment: 5/13/15 has not smoked since hospitalization - kh   Substance and Sexual Activity    Alcohol use: No     Alcohol/week: 0.0 oz    Drug use: No    Sexual activity: Yes     Partners: Male   Lifestyle    Physical activity:     Days per week: Not on file     Minutes per session: Not on file    Stress: Not on file   Relationships    Social connections:     Talks on phone: Not on file     Gets together: Not on file     Attends Sabianism service: Not on file     Active member of club or organization: Not on file     Attends meetings of clubs or organizations: Not on file     Relationship status: Not on file    Intimate partner violence:     Fear of current or ex partner: Not on file Emotionally abused: Not on file     Physically abused: Not on file     Forced sexual activity: Not on file   Other Topics Concern    Not on file   Social History Narrative    Not on file     No current facility-administered medications for this encounter. Current Outpatient Medications   Medication Sig Dispense Refill    oxyCODONE-acetaminophen (PERCOCET) 7.5-325 MG per tablet Take 1 tablet by mouth every 6 hours as needed for Pain (max 3-4 per day) for up to 21 days.  84 tablet 0    QUEtiapine (SEROQUEL) 25 MG tablet TAKE 1-2 TABLETS BY MOUTH NIGHTLY 60 tablet 0    DULoxetine (CYMBALTA) 60 MG extended release capsule TAKE 1 CAPSULE BY MOUTH DAILY 30 capsule 0    omeprazole (PRILOSEC) 20 MG delayed release capsule TAKE 1 CAPSULE BY MOUTH DAILY 30 capsule 0    ondansetron (ZOFRAN) 8 MG tablet TAKE 1 TABLET BY MOUTH EVERY 8 HOURS AS NEEDED FOR NAUSEA OR VOMITING 90 tablet 0    insulin glargine (BASAGLAR KWIKPEN) 100 UNIT/ML injection pen Inject 60 Units into the skin nightly Inject 40 units in AM and 35 units in PM (Patient taking differently: Inject 40 Units into the skin nightly Inject 40 units in AM and 35 units in PM) 15 mL 5    RANEXA 1000 MG extended release tablet TAKE 1 TABLET BY MOUTH 2 TIMES DAILY 60 tablet 3    ASPIRIN LOW DOSE 81 MG EC tablet TAKE 1 TABLET BY MOUTH DA JULIEN 30 tablet 3    insulin aspart (NOVOLOG FLEXPEN) 100 UNIT/ML injection pen Inject 5-10 Units into the skin 3 times daily (before meals) 5 pen 4    topiramate (TOPAMAX) 100 MG tablet Take 1 tablet by mouth 2 times daily 60 tablet 1    buPROPion (WELLBUTRIN XL) 150 MG extended release tablet Take 1 tablet by mouth every morning 30 tablet 1    tiZANidine (ZANAFLEX) 4 MG tablet Take 1/2 by mouth in the am, take 1 tablet by mouth in the pm 60 tablet 0    metoprolol succinate (TOPROL XL) 50 MG extended release tablet TAKE 1 TABLET BY MOUTH DAILY 30 tablet 3    hydrALAZINE (APRESOLINE) 50 MG tablet Take 1 tablet by mouth 2 systems were reviewed and are negative. Nursing notes pertaining to ROS were reviewed. PHYSICAL EXAM   BP (!) 168/87   Pulse 81   Temp 98.5 °F (36.9 °C) (Oral)   Resp 16   Ht 5' (1.524 m)   Wt 123 lb (55.8 kg)   SpO2 100%   BMI 24.02 kg/m²   General: Alert and oriented x 3, NAD. No increased work of breathing or accessory muscle use. Non-ill appearing. Appropriate and interactive  Eyes: PERRL, no scleral icterus, injection or exudate. EOMI. HENT: Atraumatic. Oral pharynx is clear, moist, no enanthem. No tonsillar hypertropy or exudate. Neck:  No Lymphadenopathy. Non-tender to palpation. Normal ROM. No JVD. No thyromegaly. No Mass. PULMONARY: Lungs clear bilaterally without wheezes, rales or rhonchi. Good air movement bilaterally. CV: Regular rate and rhythm without murmurs, rubs or gallops. ABD: Soft, non-tender, non-distended, normal bowel sounds, no hepatosplenomegaly, no masses. No peritoneal signs, rebound or guarding. Back:  No CVAT, no rash. EXT: No cyanosis or clubbing. No rash. CR < 2 seconds. No tenderness to palpation. No lower extremity edema. +2 pulses in upper/lower extremities bilaterally. Skin is warm and dry. PSYCH: normal affect    RADIOLOGY    XR CHEST PORTABLE   Final Result   Stable portable study. LABS  I have reviewed all labs for this visit.    Results for orders placed or performed during the hospital encounter of 05/15/19   Comprehensive Metabolic Panel w/ Reflex to MG   Result Value Ref Range    Sodium 140 136 - 145 mmol/L    Potassium reflex Magnesium 4.1 3.5 - 5.1 mmol/L    Chloride 104 99 - 110 mmol/L    CO2 20 (L) 21 - 32 mmol/L    Anion Gap 16 3 - 16    Glucose 197 (H) 70 - 99 mg/dL    BUN 29 (H) 7 - 20 mg/dL    CREATININE 1.2 0.6 - 1.2 mg/dL    GFR Non-African American 45 (A) >60    GFR  55 (A) >60    Calcium 9.9 8.3 - 10.6 mg/dL    Total Protein 8.1 6.4 - 8.2 g/dL    Alb 4.0 3.4 - 5.0 g/dL myocardial ischemia, unspecified ischemic chest pain type        Blood pressure (!) 168/87, pulse 81, temperature 98.5 °F (36.9 °C), temperature source Oral, resp. rate 16, height 5' (1.524 m), weight 123 lb (55.8 kg), SpO2 100 %, not currently breastfeeding.       Follow-up with:  Adriane Mayer MD  [address]    In 1 week      Nishant Arndt MD  [address] [address]  [phone]    In 1 day          Rhoda Borrero MD  05/15/19 1168

## 2019-05-15 NOTE — ED TRIAGE NOTES
Pt sitting in chair 2 hrs ago and started with mid sternal chest pain radiating to left arm. Pt nauseated.

## 2019-05-16 ENCOUNTER — TELEPHONE (OUTPATIENT)
Dept: PRIMARY CARE CLINIC | Age: 64
End: 2019-05-16

## 2019-05-16 ENCOUNTER — TELEPHONE (OUTPATIENT)
Dept: CARDIOLOGY CLINIC | Age: 64
End: 2019-05-16

## 2019-05-16 DIAGNOSIS — E11.43 DIABETIC GASTROPARESIS (HCC): ICD-10-CM

## 2019-05-16 DIAGNOSIS — K31.84 DIABETIC GASTROPARESIS (HCC): ICD-10-CM

## 2019-05-16 LAB
EKG ATRIAL RATE: 100 BPM
EKG ATRIAL RATE: 74 BPM
EKG DIAGNOSIS: NORMAL
EKG DIAGNOSIS: NORMAL
EKG P AXIS: 3 DEGREES
EKG P AXIS: 70 DEGREES
EKG P-R INTERVAL: 104 MS
EKG P-R INTERVAL: 124 MS
EKG Q-T INTERVAL: 342 MS
EKG Q-T INTERVAL: 510 MS
EKG QRS DURATION: 84 MS
EKG QRS DURATION: 84 MS
EKG QTC CALCULATION (BAZETT): 441 MS
EKG QTC CALCULATION (BAZETT): 566 MS
EKG R AXIS: 44 DEGREES
EKG R AXIS: 46 DEGREES
EKG T AXIS: 117 DEGREES
EKG T AXIS: 91 DEGREES
EKG VENTRICULAR RATE: 100 BPM
EKG VENTRICULAR RATE: 74 BPM

## 2019-05-16 PROCEDURE — 93010 ELECTROCARDIOGRAM REPORT: CPT | Performed by: INTERNAL MEDICINE

## 2019-05-16 RX ORDER — LOPERAMIDE HYDROCHLORIDE 2 MG/1
2 CAPSULE ORAL 3 TIMES DAILY PRN
Qty: 30 CAPSULE | Refills: 1 | Status: SHIPPED | OUTPATIENT
Start: 2019-05-16 | End: 2019-05-26

## 2019-05-16 NOTE — TELEPHONE ENCOUNTER
Pt was seen in ED yesterday for CP. All work-up was negative. Pt is asking if Dr. Ilene Harris can adjust any of her meds to help with her chest pain? Please advise, thank you. Pt is scheduled to see PCP 5/21/19.   Pt is scheduled to see Dr. Ilene Harris in Sept.

## 2019-05-16 NOTE — TELEPHONE ENCOUNTER
Tomeka Alva is calling stating she went to the  ER yesterday for Acute chest pain. Tomeka Alva is asking if maybe Evens Ross can adjust her medications so she does not have to keep going to the ER.     Maren can be reached at -

## 2019-05-28 ENCOUNTER — APPOINTMENT (OUTPATIENT)
Dept: GENERAL RADIOLOGY | Age: 64
End: 2019-05-28
Payer: COMMERCIAL

## 2019-05-28 ENCOUNTER — HOSPITAL ENCOUNTER (EMERGENCY)
Age: 64
Discharge: HOME | End: 2019-05-28
Payer: COMMERCIAL

## 2019-05-28 ENCOUNTER — APPOINTMENT (OUTPATIENT)
Dept: CT IMAGING | Age: 64
End: 2019-05-28
Payer: COMMERCIAL

## 2019-05-28 VITALS
WEIGHT: 127.87 LBS | TEMPERATURE: 97.6 F | BODY MASS INDEX: 25.1 KG/M2 | HEART RATE: 73 BPM | OXYGEN SATURATION: 100 % | SYSTOLIC BLOOD PRESSURE: 163 MMHG | RESPIRATION RATE: 18 BRPM | DIASTOLIC BLOOD PRESSURE: 74 MMHG | HEIGHT: 60 IN
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
## 2019-05-28 DIAGNOSIS — R07.81 RIB PAIN ON LEFT SIDE: Primary | ICD-10-CM
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
## 2019-05-28 DIAGNOSIS — M25.552 LEFT HIP PAIN: ICD-10-CM

## 2019-05-28 PROCEDURE — 70450 CT HEAD/BRAIN W/O DYE: CPT

## 2019-05-28 PROCEDURE — 72170 X-RAY EXAM OF PELVIS: CPT

## 2019-05-28 PROCEDURE — 99284 EMERGENCY DEPT VISIT MOD MDM: CPT

## 2019-05-28 PROCEDURE — 6370000000 HC RX 637 (ALT 250 FOR IP): Performed by: EMERGENCY MEDICINE

## 2019-05-28 PROCEDURE — 71101 X-RAY EXAM UNILAT RIBS/CHEST: CPT

## 2019-05-28 RX ORDER — ACETAMINOPHEN 500 MG
1000 TABLET ORAL ONCE
Status: COMPLETED | OUTPATIENT
Start: 2019-05-28 | End: 2019-05-28

## 2019-05-28 RX ADMIN — ACETAMINOPHEN 1000 MG: 500 TABLET ORAL at 09:28

## 2019-05-28 ASSESSMENT — PAIN SCALES - GENERAL
PAINLEVEL_OUTOF10: 8
PAINLEVEL_OUTOF10: 6
PAINLEVEL_OUTOF10: 7
PAINLEVEL_OUTOF10: 8

## 2019-05-28 ASSESSMENT — PAIN DESCRIPTION - PAIN TYPE: TYPE: ACUTE PAIN

## 2019-05-28 ASSESSMENT — PAIN DESCRIPTION - ORIENTATION: ORIENTATION: LEFT

## 2019-05-28 ASSESSMENT — PAIN DESCRIPTION - PROGRESSION: CLINICAL_PROGRESSION: GRADUALLY IMPROVING

## 2019-05-28 ASSESSMENT — PAIN DESCRIPTION - DESCRIPTORS: DESCRIPTORS: BURNING

## 2019-05-28 ASSESSMENT — PAIN DESCRIPTION - LOCATION: LOCATION: ARM

## 2019-05-28 NOTE — ED PROVIDER NOTES
Attestation note:  Patient was seen and evaluated per myself at bedside. Patient was seen and evaluated per myself and orientee NP Neftaly. Dr. Ilene Redd was present and available for consultation as needed. Patient is sitting comfortable in a wheelchair. Vital signs are stable. She is neurologically intact and answers questions appropriately. No evidence of distress. Is no outward evidence of head neck chest abdomen or extremity injury. She does report chest wall tenderness on the left as well as left hip tenderness. No crepitance or step-offs. Moving all extremities. I do agree with the physical exam, diagnostic testing and plan of care for this patient. CT the head negative for any evidence of mass mass effect intracranial hemorrhage or skull fracture. Rib series also negative for any evidence of hemopneumothorax and/or rib fracture. And pelvis series also negative for fracture subluxation dislocation. We feel that the patient can be safely discharged home she can continue on her current medication regimen. She can follow-up with her primary care. All diagnostic study results were discussed with the patient and family at bedside. She verbalized understanding. Patient may need to use an extra dose of Tylenol in between her hydrocodone 7.5. She could also benefit from an isolated dose or 2 of NSAID.            CYRUS Garcia - CNP  05/28/19 1005       CYRUS Garcia - CNP  05/28/19 HANY Garcia  CYRUS Reid - CNP  05/28/19 1381

## 2019-05-28 NOTE — ED PROVIDER NOTES
[address] [facility]        Pt Name: Renella Blizzard  MRN: [id number]  [place] 1955  Date of evaluation: 5/28/2019  Provider: CYRUS Shankar - CNP  PCP: Rommel Hoover MD    This patient was seen and evaluated by the STEPH[facility] [address] Felipa Reid. Attending physician available. CHIEF COMPLAINT       Chief Complaint   Patient presents with    Fall       HISTORY OF PRESENT ILLNESS   (Location/Symptom, Timing/Onset, Context/Setting, Quality, Duration, Modifying Factors, Severity)  Note limiting factors. Renella Blizzard is a 59 y.o. female who presents for evaluation of a fall. Patient states that last night at approximately 1730 she fell down 4-5 within steps. States that she missed a step causing her fall. Patient complaining of left hip pain, left rib pain, and left temple pain. Patient denies LOC. States that her left temple pain is throbbing and her left rib and left leg pain is burning. Patient denies taking any analgesics prior to arrival.  She states that she did have chest pain last night after her fall, she took nitro with relief. Patient denies chest pain at this time, shortness of breath, fever, chills. Nursing Notes were all reviewed and agreed with or any disagreements were addressed  in the HPI. REVIEW OF SYSTEMS    (2-9 systems for level 4, 10 or more for level 5)     Review of Systems    Positives and Pertinent negatives as per HPI. Except as noted abovein the ROS, all other systems were reviewed and negative.        PAST MEDICAL HISTORY     Past Medical History:   Diagnosis Date    Acid reflux     Anemia     Anxiety     Arthritis     Asthma     Atrial fibrillation ([facility]     Blood transfusion reaction     CAD (coronary artery disease) 12/3/2012    CHF (congestive heart failure) (HCC)     Chronic kidney disease     40% kidney functiom    COPD (chronic obstructive pulmonary disease) ([facility]     Depression     DM2 (diabetes mellitus, type 2) ([facility] [facility]     Dysarthria     Fibromyalgia 6/7/2016    Headache(784.0) 2/19/2013    Hemisensory loss     Hyperlipidemia     Hypertension     IBS (irritable bowel syndrome)     Inferior vena cava occlusion (HCC)     Irritable bowel syndrome     Keratitis     Neuropathy     Superior vena cava obstruction     Temporal arteritis ([facility] [facility] 2/24/2014         SURGICAL HISTORY     Past Surgical History:   Procedure Laterality Date    ABLATION OF DYSRHYTHMIC FOCUS      ARTERY BIOPSY Right 04/23/2014    RIGHT TEMPORAL ARTERY BIOPSY    CATARACT REMOVAL Bilateral     CHOLECYSTECTOMY      CORONARY ANGIOPLASTY WITH STENT PLACEMENT      CORONARY ANGIOPLASTY WITH STENT PLACEMENT      HYSTERECTOMY      JOINT REPLACEMENT Right     KNEE ARTHROSCOPY Right     KNEE SURGERY      right knee replacement    TUNNELED VENOUS PORT PLACEMENT      left thigh.  VASCULAR SURGERY           CURRENTMEDICATIONS       Previous Medications    ALBUTEROL SULFATE HFA (PROAIR HFA) 108 (90 BASE) MCG/ACT INHALER    Inhale 2 puffs into the lungs every 6 hours as needed for Wheezing    AMLODIPINE (NORVASC) 10 MG TABLET    Take 0.5 tablets by mouth daily    ASPIRIN LOW DOSE 81 MG EC TABLET    TAKE 1 TABLET BY MOUTH DA JULIEN    BUDESONIDE-FORMOTEROL (SYMBICORT) 160-4.5 MCG/ACT AERO    Inhale 2 puffs into the lungs daily    BUPROPION (WELLBUTRIN XL) 150 MG EXTENDED RELEASE TABLET    Take 1 tablet by mouth every morning    BUTALBITAL-ASPIRIN-CAFFEINE (FIORINAL) -40 MG PER CAPSULE    Take 1 capsule by mouth every 6 hours as needed for Headaches for up to 14 days. .    CHLORTHALIDONE (HYGROTON) 25 MG TABLET    TAKE ONE TABLET BY MOUTH DAILY    CLOPIDOGREL (PLAVIX) 75 MG TABLET    Take 1 tablet by mouth daily    CONTINUOUS BLOOD GLUC  (FREESTYLE LISSETT 14 DAY READER) DAYO    TEST THREE TIMES DAILY  250.00  E11.9    CONTINUOUS BLOOD GLUC SENSOR (InauraSTYLE LISSETT 14 DAY SENSOR) MISC    Test MISC    USE THREE TIMES A DAY         ALLERGIES     Patient has no known allergies. FAMILYHISTORY       Family History   Problem Relation Age of Onset    Diabetes Mother     Hypertension Mother     High Cholesterol Mother     Stroke Mother     Cancer Mother           SOCIAL HISTORY       Social History     Socioeconomic History    Marital status:       Spouse name: Not on file    Number of children: 6    Years of education: Not on file    Highest education level: Not on file   Occupational History    Not on file   Social Needs    Financial resource strain: Not on file    Food insecurity:     Worry: Not on file     Inability: Not on file    Transportation needs:     Medical: Not on file     Non-medical: Not on file   Tobacco Use    Smoking status: Former Smoker     Packs/day: 0.50     Years: 35.00     Pack years: 17.50     Types: Cigarettes     Last attempt to quit: 2018     Years since quittin.9    Smokeless tobacco: Never Used    Tobacco comment: 5/13/15 has not smoked since hospitalization -    Substance and Sexual Activity    Alcohol use: No     Alcohol/week: 0.0 oz    Drug use: No    Sexual activity: Yes     Partners: Male   Lifestyle    Physical activity:     Days per week: Not on file     Minutes per session: Not on file    Stress: Not on file   Relationships    Social connections:     Talks on phone: Not on file     Gets together: Not on file     Attends Congregational service: Not on file     Active member of club or organization: Not on file     Attends meetings of clubs or organizations: Not on file     Relationship status: Not on file    Intimate partner violence:     Fear of current or ex partner: Not on file     Emotionally abused: Not on file     Physically abused: Not on file     Forced sexual activity: Not on file   Other Topics Concern    Not on file   Social History Narrative    Not on file       SCREENINGS             PHYSICAL EXAM    (up to 7 for level 4, 8 or more for level 5)     ED Triage Vitals   BP Temp Temp src Pulse Resp SpO2 Height Weight   -- -- -- -- -- -- -- --       Physical Exam   Constitutional: She is oriented to person, place, and time. She appears well-developed and well-nourished. HENT:   Head: Normocephalic and atraumatic. Nose: Nose normal.   Eyes: Right eye exhibits no discharge. Left eye exhibits no discharge. Neck: Normal range of motion. Neck supple. Cardiovascular: Normal rate, regular rhythm and normal heart sounds. Pulmonary/Chest: Effort normal and breath sounds normal. No respiratory distress. She exhibits tenderness. Pain with palpation to left chest wall   Abdominal: Soft. Bowel sounds are normal.   Musculoskeletal: Normal range of motion. Left hip: She exhibits tenderness. Neurological: She is alert and oriented to person, place, and time. Skin: Skin is warm and dry. She is not diaphoretic. Psychiatric: She has a normal mood and affect. Her behavior is normal.   Nursing note and vitals reviewed. DIAGNOSTIC RESULTS   LABS:    Labs Reviewed - No data to display    All other labs were within normal range or not returned as of this dictation. EKG: All EKG's are interpreted by the Emergency Department Physician who either signs orCo-signs this chart in the absence of a cardiologist.  Please see their note for interpretation of EKG. RADIOLOGY:   Non-plain film images such as CT, Ultrasound and MRI are read by the radiologist. Lurlene Christianne radiographic images are visualized andpreliminarily interpreted by the  ED Provider with the below findings:        Interpretation [facility] Radiologist below, if available at the time of this note:    XR PELVIS (1-2 VIEWS)   Final Result   No acute abnormality of the pelvis, no evidence of fracture. CT Head WO Contrast   Final Result   No acute intracranial abnormality. XR RIBS LEFT INCLUDE CHEST (MIN 3 VIEWS)    (Results Pending)     No results found.

## 2019-05-29 ENCOUNTER — OFFICE VISIT (OUTPATIENT)
Dept: PAIN MANAGEMENT | Age: 64
End: 2019-05-29
Payer: COMMERCIAL

## 2019-05-29 VITALS
BODY MASS INDEX: 24.02 KG/M2 | HEART RATE: 79 BPM | SYSTOLIC BLOOD PRESSURE: 175 MMHG | DIASTOLIC BLOOD PRESSURE: 92 MMHG | WEIGHT: 123 LBS

## 2019-05-29 DIAGNOSIS — M50.30 DDD (DEGENERATIVE DISC DISEASE), CERVICAL: ICD-10-CM

## 2019-05-29 DIAGNOSIS — M79.7 FIBROMYALGIA: ICD-10-CM

## 2019-05-29 DIAGNOSIS — G89.4 CHRONIC PAIN SYNDROME: ICD-10-CM

## 2019-05-29 DIAGNOSIS — E11.40 CHRONIC PAINFUL DIABETIC NEUROPATHY (HCC): ICD-10-CM

## 2019-05-29 PROCEDURE — 3045F PR MOST RECENT HEMOGLOBIN A1C LEVEL 7.0-9.0%: CPT | Performed by: INTERNAL MEDICINE

## 2019-05-29 PROCEDURE — 2022F DILAT RTA XM EVC RTNOPTHY: CPT | Performed by: INTERNAL MEDICINE

## 2019-05-29 PROCEDURE — 20553 NJX 1/MLT TRIGGER POINTS 3/>: CPT | Performed by: INTERNAL MEDICINE

## 2019-05-29 PROCEDURE — 99213 OFFICE O/P EST LOW 20 MIN: CPT | Performed by: INTERNAL MEDICINE

## 2019-05-29 PROCEDURE — G8420 CALC BMI NORM PARAMETERS: HCPCS | Performed by: INTERNAL MEDICINE

## 2019-05-29 PROCEDURE — 1036F TOBACCO NON-USER: CPT | Performed by: INTERNAL MEDICINE

## 2019-05-29 PROCEDURE — 3017F COLORECTAL CA SCREEN DOC REV: CPT | Performed by: INTERNAL MEDICINE

## 2019-05-29 PROCEDURE — G8427 DOCREV CUR MEDS BY ELIG CLIN: HCPCS | Performed by: INTERNAL MEDICINE

## 2019-05-29 PROCEDURE — G8598 ASA/ANTIPLAT THER USED: HCPCS | Performed by: INTERNAL MEDICINE

## 2019-05-29 RX ORDER — OXYCODONE AND ACETAMINOPHEN 7.5; 325 MG/1; MG/1
1 TABLET ORAL EVERY 6 HOURS PRN
Qty: 100 TABLET | Refills: 0 | Status: SHIPPED | OUTPATIENT
Start: 2019-05-29 | End: 2019-06-21 | Stop reason: SDUPTHER

## 2019-05-29 RX ORDER — BUPROPION HYDROCHLORIDE 150 MG/1
150 TABLET ORAL EVERY MORNING
Qty: 30 TABLET | Refills: 1 | Status: SHIPPED | OUTPATIENT
Start: 2019-05-29 | End: 2019-06-21 | Stop reason: SDUPTHER

## 2019-05-29 RX ORDER — TRIAMCINOLONE ACETONIDE 40 MG/ML
40 INJECTION, SUSPENSION INTRA-ARTICULAR; INTRAMUSCULAR ONCE
Status: COMPLETED | OUTPATIENT
Start: 2019-05-29 | End: 2019-05-29

## 2019-05-29 RX ORDER — TIZANIDINE 4 MG/1
TABLET ORAL
Qty: 60 TABLET | Refills: 0 | Status: SHIPPED | OUTPATIENT
Start: 2019-05-29 | End: 2019-06-21 | Stop reason: SDUPTHER

## 2019-05-29 RX ORDER — QUETIAPINE FUMARATE 25 MG/1
25-50 TABLET, FILM COATED ORAL NIGHTLY
Qty: 60 TABLET | Refills: 0 | Status: SHIPPED | OUTPATIENT
Start: 2019-05-29 | End: 2019-06-21 | Stop reason: SDUPTHER

## 2019-05-29 RX ORDER — DULOXETIN HYDROCHLORIDE 60 MG/1
60 CAPSULE, DELAYED RELEASE ORAL DAILY
Qty: 30 CAPSULE | Refills: 0 | Status: SHIPPED | OUTPATIENT
Start: 2019-05-29 | End: 2019-06-21 | Stop reason: SDUPTHER

## 2019-05-29 RX ORDER — TOPIRAMATE 100 MG/1
100 TABLET, FILM COATED ORAL 2 TIMES DAILY
Qty: 60 TABLET | Refills: 1 | Status: SHIPPED | OUTPATIENT
Start: 2019-05-29 | End: 2019-06-18 | Stop reason: SDUPTHER

## 2019-05-29 RX ORDER — OMEPRAZOLE 20 MG/1
20 CAPSULE, DELAYED RELEASE ORAL DAILY
Qty: 30 CAPSULE | Refills: 0 | Status: SHIPPED | OUTPATIENT
Start: 2019-05-29 | End: 2019-06-21 | Stop reason: SDUPTHER

## 2019-05-29 RX ADMIN — TRIAMCINOLONE ACETONIDE 40 MG: 40 INJECTION, SUSPENSION INTRA-ARTICULAR; INTRAMUSCULAR at 15:40

## 2019-05-29 NOTE — PROGRESS NOTES
list of allergies were reviewed     MEDICATIONS: Ms. Chilo Hernandez list of medications were reviewed. Her current medications are   Outpatient Medications Prior to Visit   Medication Sig Dispense Refill    oxyCODONE-acetaminophen (PERCOCET) 7.5-325 MG per tablet Take 1 tablet by mouth every 6 hours as needed for Pain (max 3-4 per day) for up to 21 days.  84 tablet 0    QUEtiapine (SEROQUEL) 25 MG tablet TAKE 1-2 TABLETS BY MOUTH NIGHTLY 60 tablet 0    DULoxetine (CYMBALTA) 60 MG extended release capsule TAKE 1 CAPSULE BY MOUTH DAILY 30 capsule 0    omeprazole (PRILOSEC) 20 MG delayed release capsule TAKE 1 CAPSULE BY MOUTH DAILY 30 capsule 0    ondansetron (ZOFRAN) 8 MG tablet TAKE 1 TABLET BY MOUTH EVERY 8 HOURS AS NEEDED FOR NAUSEA OR VOMITING 90 tablet 0    insulin glargine (BASAGLAR KWIKPEN) 100 UNIT/ML injection pen Inject 60 Units into the skin nightly Inject 40 units in AM and 35 units in PM (Patient taking differently: Inject 40 Units into the skin nightly Inject 40 units in AM and 35 units in PM) 15 mL 5    RANEXA 1000 MG extended release tablet TAKE 1 TABLET BY MOUTH 2 TIMES DAILY 60 tablet 3    ASPIRIN LOW DOSE 81 MG EC tablet TAKE 1 TABLET BY MOUTH DA JULIEN 30 tablet 3    insulin aspart (NOVOLOG FLEXPEN) 100 UNIT/ML injection pen Inject 5-10 Units into the skin 3 times daily (before meals) 5 pen 4    topiramate (TOPAMAX) 100 MG tablet Take 1 tablet by mouth 2 times daily 60 tablet 1    buPROPion (WELLBUTRIN XL) 150 MG extended release tablet Take 1 tablet by mouth every morning 30 tablet 1    tiZANidine (ZANAFLEX) 4 MG tablet Take 1/2 by mouth in the am, take 1 tablet by mouth in the pm 60 tablet 0    metoprolol succinate (TOPROL XL) 50 MG extended release tablet TAKE 1 TABLET BY MOUTH DAILY 30 tablet 3    hydrALAZINE (APRESOLINE) 50 MG tablet Take 1 tablet by mouth 2 times daily 60 tablet 5    torsemide (DEMADEX) 20 MG tablet Take 1 tablet by mouth daily 30 tablet 5    isosorbide mononitrate tightness and shortness of breath or change in baseline breathing. Gastrointestinal: Negative for nausea, vomiting, abdominal pain, diarrhea, constipation, blood in stool and abdominal distention. PHYSICAL EXAM:   Nursing note and vitals reviewed. BP (!) 175/92   Pulse 79   Wt 123 lb (55.8 kg)   BMI 24.02 kg/m²   Constitutional: She appears well-developed and well-nourished. No acute distress. Skin: Skin is warm and dry, good turgor. No rash noted. She is not diaphoretic. Cardiovascular: Normal rate, regular rhythm, normal heart sounds, and does not have murmur. Pulmonary/Chest: Effort normal. No respiratory distress. She does not have wheezes in the lung fields. She has no rales. Neurological/Psychiatric:She is alert and oriented to person, place, and time. Coordination is  normal.  Her mood isAppropriate and affect is Flat/blunted and Anxious . Other: Back, + taut bands with TP's, decrease ROM   IMPRESSION:   1. Fibromyalgia    2. Chronic pain syndrome    3. Chronic painful diabetic neuropathy ([facility] [address]    4. DDD (degenerative disc disease), cervical        PLAN:  Informed verbal consent was obtained  - Records from ER reviewed   -Xrays reviewed  No fracture of the ribs of pelvis   -continue with Percocet 3-4 per day   -Informed verbal consent was obtained from the patient. Risks and benefits of the procedure were explained. Complications of the procedure and side effects of kenalog/ lidocaine were discussed with patient. Using 0.25% marcaine and 1cc of kenalog, the the tender trigger point areas in the  bilateral paraspinal lumbar muscles -erector spinae and longgissmus muscles were injected under aseptic condition. Mobilization attempted by stretching. Patient tolerated procedure well. Adv to apply ice today. -Adv Biofeedback, relaxation and meditation techniques.  Referral to psychologist for CBT was also discussed with patient   Current Outpatient Medications   Medication Sig Dispense Refill    oxyCODONE-acetaminophen (PERCOCET) 7.5-325 MG per tablet Take 1 tablet by mouth every 6 hours as needed for Pain (max 3-4 per day) for up to 21 days.  84 tablet 0    QUEtiapine (SEROQUEL) 25 MG tablet TAKE 1-2 TABLETS BY MOUTH NIGHTLY 60 tablet 0    DULoxetine (CYMBALTA) 60 MG extended release capsule TAKE 1 CAPSULE BY MOUTH DAILY 30 capsule 0    omeprazole (PRILOSEC) 20 MG delayed release capsule TAKE 1 CAPSULE BY MOUTH DAILY 30 capsule 0    ondansetron (ZOFRAN) 8 MG tablet TAKE 1 TABLET BY MOUTH EVERY 8 HOURS AS NEEDED FOR NAUSEA OR VOMITING 90 tablet 0    insulin glargine (BASAGLAR KWIKPEN) 100 UNIT/ML injection pen Inject 60 Units into the skin nightly Inject 40 units in AM and 35 units in PM (Patient taking differently: Inject 40 Units into the skin nightly Inject 40 units in AM and 35 units in PM) 15 mL 5    RANEXA 1000 MG extended release tablet TAKE 1 TABLET BY MOUTH 2 TIMES DAILY 60 tablet 3    ASPIRIN LOW DOSE 81 MG EC tablet TAKE 1 TABLET BY MOUTH DA JULIEN 30 tablet 3    insulin aspart (NOVOLOG FLEXPEN) 100 UNIT/ML injection pen Inject 5-10 Units into the skin 3 times daily (before meals) 5 pen 4    topiramate (TOPAMAX) 100 MG tablet Take 1 tablet by mouth 2 times daily 60 tablet 1    buPROPion (WELLBUTRIN XL) 150 MG extended release tablet Take 1 tablet by mouth every morning 30 tablet 1    tiZANidine (ZANAFLEX) 4 MG tablet Take 1/2 by mouth in the am, take 1 tablet by mouth in the pm 60 tablet 0    metoprolol succinate (TOPROL XL) 50 MG extended release tablet TAKE 1 TABLET BY MOUTH DAILY 30 tablet 3    hydrALAZINE (APRESOLINE) 50 MG tablet Take 1 tablet by mouth 2 times daily 60 tablet 5    torsemide (DEMADEX) 20 MG tablet Take 1 tablet by mouth daily 30 tablet 5    isosorbide mononitrate (IMDUR) 30 MG extended release tablet Take 1 tablet by mouth daily 30 tablet 5    amLODIPine (NORVASC) 10 MG tablet Take 0.5 tablets by mouth daily 30 tablet 5    Continuous Blood Gluc  (FashionAttitude.comSTYLE LISSETT 14 DAY READER) DAYO TEST THREE TIMES DAILY  250.00  E11.9 1 Device 0    Continuous Blood Gluc Sensor (FashionAttitude.comSTYLE LISSETT 14 DAY SENSOR) MISC Test three times daily   250.00 E 11.9 1 each 5    rosuvastatin (CRESTOR) 40 MG tablet Take 1 tablet by mouth nightly 90 tablet 5    nitroGLYCERIN (NITROSTAT) 0.4 MG SL tablet PLACE 1 TABLET UNDER THE TONGUE EVERY 5 MINUTES AS NEEDED FOR CHEST PAIN. 25 tablet 2    ULTICARE MINI PEN NEEDLES 31G X 6 MM MISC USE THREE TIMES A  each 5    budesonide-formoterol (SYMBICORT) 160-4.5 MCG/ACT AERO Inhale 2 puffs into the lungs daily (Patient taking differently: Inhale 2 puffs into the lungs daily as needed ) 11 g 4    albuterol sulfate HFA (PROAIR HFA) 108 (90 Base) MCG/ACT inhaler Inhale 2 puffs into the lungs every 6 hours as needed for Wheezing 1 Inhaler 5    chlorthalidone (HYGROTON) 25 MG tablet TAKE ONE TABLET BY MOUTH DAILY 90 tablet 0    clopidogrel (PLAVIX) 75 MG tablet Take 1 tablet by mouth daily 90 tablet 5    ranitidine (ZANTAC) 150 MG tablet Take 1 tablet by mouth 2 times daily as needed for Heartburn (Patient taking differently: Take 150 mg by mouth 2 times daily ) 60 tablet 3    glucose monitoring kit (FREESTYLE) monitoring kit 1 each by Does not apply route once for 1 dose May dispense glucometer of patients preference along with compatible strips. 1 kit 1    butalbital-aspirin-caffeine (FIORINAL) -40 MG per capsule Take 1 capsule by mouth every 6 hours as needed for Headaches for up to 14 days. . 30 capsule 0     No current facility-administered medications for this visit. I will continue her current medication regimen  which is part of the above treatment schedule. It has been helping with Ms. Meza's chronic  medical problems which for this visit include:   Diagnoses of Chronic pain syndrome, Chronic painful diabetic neuropathy ([address] Fibromyalgia, Primary insomnia, Moderate episode of recurrent major depressive disorder ([address] Gastro-esophageal reflux disease without esophagitis, and DDD (degenerative disc disease), cervical were pertinent to this visit. Risks and benefits of the medications and other alternative treatments  including no treatment were discussed with the patient. The common side effects of these medications were also explained to the patient. Informed verbal consent was obtained. Goals of current treatment regimen include improvement in pain, restoration of functioning- with focus on improvement in physical performance, general activity, work or disability,emotional distress, health care utilization and  decreased medication consumption. Will continue to monitor progress towards achieving/maintaining therapeutic goals with special emphasis on  1. Improvement in perceived interfernce  of pain with ADL's. Ability to do home exercises independently. Ability to do household chores indoor and/or outdoor work and social and leisure activities. Improve psychosocial and physical functioning. - she is showing progression towards this treatment goal with the current regimen. She was advised against drinking alcohol with the narcotic pain medicines, advised against driving or handling machinery while adjusting the dose of medicines or if having cognitive  issues related to the current medications. Risk of overdose and death, if medicines not taken as prescribed, were also discussed. If the patient develops new symptoms or if the symptoms worsen, the patient should call the office. While transcribing every attempt was made to maintain the accuracy of the note in terms of it's contents,there may have been some errors made inadvertently. Thank you for allowing me to participate in the care of this patient.     Ely Douglass MD.    Cc: Hayden FridayMD HARRIS, Francesca Joseph, am scribing for and in the presence of Dr. Ely Douglass.   05/29/19  3:32 PM  RACHEL Thayer, Dr. Ely Douglass, personally performed the services described in this documentation as scribed by   Raquel Peck MA in my presence and it is both accurate and complete

## 2019-06-03 DIAGNOSIS — G47.09 OTHER INSOMNIA: Primary | ICD-10-CM

## 2019-06-05 RX ORDER — ZOLPIDEM TARTRATE 5 MG/1
TABLET ORAL
Qty: 30 TABLET | Refills: 1 | Status: SHIPPED | OUTPATIENT
Start: 2019-06-05 | End: 2019-07-05

## 2019-06-13 ENCOUNTER — OFFICE VISIT (OUTPATIENT)
Dept: PRIMARY CARE CLINIC | Age: 64
End: 2019-06-13
Payer: COMMERCIAL

## 2019-06-13 VITALS
BODY MASS INDEX: 25.13 KG/M2 | DIASTOLIC BLOOD PRESSURE: 70 MMHG | WEIGHT: 128 LBS | HEART RATE: 85 BPM | SYSTOLIC BLOOD PRESSURE: 140 MMHG | HEIGHT: 60 IN

## 2019-06-13 DIAGNOSIS — G44.89 OTHER HEADACHE SYNDROME: ICD-10-CM

## 2019-06-13 DIAGNOSIS — Z71.89 COUNSELING ON INJURY PREVENTION: ICD-10-CM

## 2019-06-13 DIAGNOSIS — I10 ESSENTIAL HYPERTENSION: ICD-10-CM

## 2019-06-13 DIAGNOSIS — F32.A ANXIETY AND DEPRESSION: ICD-10-CM

## 2019-06-13 DIAGNOSIS — F41.9 ANXIETY AND DEPRESSION: ICD-10-CM

## 2019-06-13 PROCEDURE — 2022F DILAT RTA XM EVC RTNOPTHY: CPT | Performed by: INTERNAL MEDICINE

## 2019-06-13 PROCEDURE — 3045F PR MOST RECENT HEMOGLOBIN A1C LEVEL 7.0-9.0%: CPT | Performed by: INTERNAL MEDICINE

## 2019-06-13 PROCEDURE — 99214 OFFICE O/P EST MOD 30 MIN: CPT | Performed by: INTERNAL MEDICINE

## 2019-06-13 PROCEDURE — G8419 CALC BMI OUT NRM PARAM NOF/U: HCPCS | Performed by: INTERNAL MEDICINE

## 2019-06-13 PROCEDURE — 3017F COLORECTAL CA SCREEN DOC REV: CPT | Performed by: INTERNAL MEDICINE

## 2019-06-13 PROCEDURE — G8427 DOCREV CUR MEDS BY ELIG CLIN: HCPCS | Performed by: INTERNAL MEDICINE

## 2019-06-13 PROCEDURE — 1036F TOBACCO NON-USER: CPT | Performed by: INTERNAL MEDICINE

## 2019-06-13 PROCEDURE — G8598 ASA/ANTIPLAT THER USED: HCPCS | Performed by: INTERNAL MEDICINE

## 2019-06-13 RX ORDER — FLASH GLUCOSE SENSOR
1 KIT MISCELLANEOUS
Qty: 4 EACH | Refills: 5 | Status: ON HOLD | OUTPATIENT
Start: 2019-06-13 | End: 2019-07-28

## 2019-06-13 RX ORDER — FLASH GLUCOSE SCANNING READER
1 EACH MISCELLANEOUS
Qty: 1 DEVICE | Refills: 0 | Status: ON HOLD | OUTPATIENT
Start: 2019-06-13 | End: 2019-07-28

## 2019-06-13 ASSESSMENT — ENCOUNTER SYMPTOMS
DIARRHEA: 1
SHORTNESS OF BREATH: 1
CHEST TIGHTNESS: 0
EYES NEGATIVE: 1
ABDOMINAL DISTENTION: 0
BACK PAIN: 1
ABDOMINAL PAIN: 0
CONSTIPATION: 1

## 2019-06-13 NOTE — PROGRESS NOTES
Subjective:      Patient ID: Cyrus Mason is a 59 y.o. female. 6/13/19 Patient presents with:  Fall  Was seen in ER 5/28/19 . Xrays WNL . No fracture   Check-Up Was in ER with CP 5/15/19 . W/U neg               Last seen  4/11/19 Patient presents with: Follow-up  Headache  Diabetes: sugars still high           Last seen  3/6/19 Patient presents with:  Diarrhea  Nausea  Memory Loss  Rash                Fall   Pertinent negatives include no abdominal pain, fever or headaches. Review of Systems   Constitutional: Negative for activity change, appetite change, fatigue, fever and unexpected weight change. Flu vac 9/18     tdap 10/16      pneumovac 10/13     Shingrex / Script  8/18      Eyes: Negative. Last Eye Ex 4/18 [facility]    Respiratory: Positive for shortness of breath (baseline). Negative for chest tightness. Not smoking > 1 yr       Was Smoking  off and on . Seen  Pulmonary 2/19 for Ch Cough / SOB   Denies Etoh . No rec drugs    Cardiovascular: Negative. Known CAD with Stents . Last Cardiac exam 1/19 with Dr Angelika Medina     Was in [facility] 5/18/19 with CP . Neg w/u   Gastrointestinal: Positive for constipation and diarrhea. Negative for abdominal distention and abdominal pain. Upper / lower endopscopy 4/19       No Fh of ca colon . Genitourinary: Negative for dysuria, frequency, menstrual problem, urgency and vaginal discharge. Hysterectomy   Mammogram 1/16    Musculoskeletal: Positive for arthralgias, back pain and myalgias. Pain Disorder c/o Pain Management    Neurological: Negative for dizziness, weakness and headaches. Hematological:           Did see Hematology for anemia . Bone Marrow exam Nl    Psychiatric/Behavioral: Negative for behavioral problems and sleep disturbance. The patient is not nervous/anxious. Objective:   Physical Exam   Constitutional: She is oriented to person, place, and time.  She appears well-nourished. No distress. Eyes: Conjunctivae are normal.   Neck: Neck supple. Cardiovascular: Regular rhythm and normal heart sounds. Pulmonary/Chest: She has no wheezes. Reduced BS   Abdominal: Soft. She exhibits distension. There is no tenderness. There is no guarding. Musculoskeletal: Normal range of motion. She exhibits no tenderness. Neurological: She is alert and oriented to person, place, and time. She has normal strength. Skin: Skin is warm and dry. Rash: dry. Psychiatric: She exhibits a depressed mood. Vitals reviewed. Assessment:      Maren was seen today for fall and check-up. Diagnoses and all orders for this visit:    Counseling on injury prevention    Uncontrolled type 2 diabetes mellitus with complication, with long-term current use of insulin ([address]  -     Hemoglobin A1C; Future  -     Continuous Blood Gluc  (FREESTYLE LISSETT 14 DAY READER) DAYO; 1 kit by Does not apply route every 14 days  -     Continuous Blood Gluc Sensor (FREESTYLE LISSETT 14 DAY SENSOR) MISC; 1 each by Does not apply route every 14 days  -     insulin glargine (BASAGLAR KWIKPEN) 100 UNIT/ML injection pen; Inject 60 Units into the skin 2 times daily  -     insulin aspart (NOVOLOG FLEXPEN) 100 UNIT/ML injection pen; Inject 5-10 Units into the skin 3 times daily (before meals)      Other headache syndrome   SE of meds          Diabetic gastroparesis (HCC)  Control sugars . -    Essential hypertension  Controlled   -     hydrALAZINE (APRESOLINE) 50 MG tablet; Take 1 tablet by mouth 2 times daily  -     torsemide (DEMADEX) 20 MG tablet; Take 1 tablet by mouth daily  -     isosorbide mononitrate (IMDUR) 30 MG extended release tablet; Take 1 tablet by mouth daily  -     amLODIPine (NORVASC) 10 MG tablet; Take 0.5 tablets by mouth daily      Other hyperlipidemia  LDL > 100 . On  Crestor now   -     rosuvastatin (CRESTOR) 40 MG tablet;  Take 1 tablet by mouth nightly      Pain disorder  C/O  Pain management ;       Coronary artery disease involving native coronary artery of native heart without angina pectoris  -     metoprolol succinate (TOPROL XL) 25 MG extended release tablet; Take 0.5 tablets by mouth 2 times daily  -     isosorbide mononitrate (IMDUR) 30 MG extended release tablet; Take 1 tablet by mouth daily  -     torsemide (DEMADEX) 20 MG tablet; Take 1 tablet by mouth daily  -     clopidogrel (PLAVIX) 75 MG tablet; Take 1 tablet by mouth daily  -     ranolazine (RANEXA) 1000 MG extended release tablet; Take 1 tablet by mouth 2 times daily  -     aspirin EC 81 MG EC tablet; Take 1 tablet by mouth daily  -     Crestor 40 MG tablet;  Take 1 tablet by mouth daily            Anxiety  and Depression     cymbalta per Pain Dr   Plan:              Milo Man MD

## 2019-06-14 ENCOUNTER — TELEPHONE (OUTPATIENT)
Dept: PRIMARY CARE CLINIC | Age: 64
End: 2019-06-14

## 2019-06-18 ENCOUNTER — TELEPHONE (OUTPATIENT)
Dept: PRIMARY CARE CLINIC | Age: 64
End: 2019-06-18

## 2019-06-18 DIAGNOSIS — G89.4 CHRONIC PAIN SYNDROME: ICD-10-CM

## 2019-06-18 RX ORDER — TOPIRAMATE 100 MG/1
100 TABLET, FILM COATED ORAL 2 TIMES DAILY
Qty: 60 TABLET | Refills: 1 | Status: SHIPPED | OUTPATIENT
Start: 2019-06-18 | End: 2019-06-30 | Stop reason: SDUPTHER

## 2019-06-19 RX ORDER — SULFAMETHOXAZOLE AND TRIMETHOPRIM 400; 80 MG/1; MG/1
TABLET ORAL
Qty: 14 TABLET | Refills: 0 | Status: ON HOLD | OUTPATIENT
Start: 2019-06-19 | End: 2019-07-28 | Stop reason: ALTCHOICE

## 2019-06-21 ENCOUNTER — OFFICE VISIT (OUTPATIENT)
Dept: PAIN MANAGEMENT | Age: 64
End: 2019-06-21
Payer: COMMERCIAL

## 2019-06-21 VITALS
DIASTOLIC BLOOD PRESSURE: 74 MMHG | WEIGHT: 124 LBS | HEART RATE: 70 BPM | BODY MASS INDEX: 24.22 KG/M2 | SYSTOLIC BLOOD PRESSURE: 144 MMHG

## 2019-06-21 DIAGNOSIS — M79.7 FIBROMYALGIA: ICD-10-CM

## 2019-06-21 DIAGNOSIS — G89.4 CHRONIC PAIN SYNDROME: ICD-10-CM

## 2019-06-21 DIAGNOSIS — E11.40 CHRONIC PAINFUL DIABETIC NEUROPATHY (HCC): ICD-10-CM

## 2019-06-21 DIAGNOSIS — M50.30 DDD (DEGENERATIVE DISC DISEASE), CERVICAL: ICD-10-CM

## 2019-06-21 PROCEDURE — G8598 ASA/ANTIPLAT THER USED: HCPCS | Performed by: INTERNAL MEDICINE

## 2019-06-21 PROCEDURE — 99213 OFFICE O/P EST LOW 20 MIN: CPT | Performed by: INTERNAL MEDICINE

## 2019-06-21 PROCEDURE — G8427 DOCREV CUR MEDS BY ELIG CLIN: HCPCS | Performed by: INTERNAL MEDICINE

## 2019-06-21 PROCEDURE — 2022F DILAT RTA XM EVC RTNOPTHY: CPT | Performed by: INTERNAL MEDICINE

## 2019-06-21 PROCEDURE — 3045F PR MOST RECENT HEMOGLOBIN A1C LEVEL 7.0-9.0%: CPT | Performed by: INTERNAL MEDICINE

## 2019-06-21 PROCEDURE — G8420 CALC BMI NORM PARAMETERS: HCPCS | Performed by: INTERNAL MEDICINE

## 2019-06-21 PROCEDURE — 3017F COLORECTAL CA SCREEN DOC REV: CPT | Performed by: INTERNAL MEDICINE

## 2019-06-21 PROCEDURE — 1036F TOBACCO NON-USER: CPT | Performed by: INTERNAL MEDICINE

## 2019-06-21 RX ORDER — QUETIAPINE FUMARATE 25 MG/1
25-50 TABLET, FILM COATED ORAL NIGHTLY
Qty: 60 TABLET | Refills: 0 | Status: ON HOLD | OUTPATIENT
Start: 2019-06-21 | End: 2019-07-30 | Stop reason: SDUPTHER

## 2019-06-21 RX ORDER — OMEPRAZOLE 20 MG/1
20 CAPSULE, DELAYED RELEASE ORAL DAILY
Qty: 30 CAPSULE | Refills: 0 | Status: ON HOLD | OUTPATIENT
Start: 2019-06-21 | End: 2019-07-30 | Stop reason: SDUPTHER

## 2019-06-21 RX ORDER — DULOXETIN HYDROCHLORIDE 60 MG/1
60 CAPSULE, DELAYED RELEASE ORAL DAILY
Qty: 30 CAPSULE | Refills: 0 | Status: ON HOLD | OUTPATIENT
Start: 2019-06-21 | End: 2019-07-30 | Stop reason: SDUPTHER

## 2019-06-21 RX ORDER — OXYCODONE AND ACETAMINOPHEN 7.5; 325 MG/1; MG/1
1 TABLET ORAL EVERY 6 HOURS PRN
Qty: 126 TABLET | Refills: 0 | Status: SHIPPED | OUTPATIENT
Start: 2019-06-21 | End: 2019-07-21

## 2019-06-21 RX ORDER — BUPROPION HYDROCHLORIDE 150 MG/1
150 TABLET ORAL EVERY MORNING
Qty: 30 TABLET | Refills: 1 | Status: ON HOLD | OUTPATIENT
Start: 2019-06-21 | End: 2019-07-29 | Stop reason: SDUPTHER

## 2019-06-21 RX ORDER — TIZANIDINE 4 MG/1
TABLET ORAL
Qty: 60 TABLET | Refills: 0 | Status: SHIPPED | OUTPATIENT
Start: 2019-06-21 | End: 2019-08-06 | Stop reason: SDUPTHER

## 2019-06-21 NOTE — TELEPHONE ENCOUNTER
[phone] (home)      Medication was requested by pharmacy for a refill. Patient states she did not need medication it was requested in error.  Call complete

## 2019-06-21 NOTE — PROGRESS NOTES
she is using Seroquel along with Cymbalta. Patient denies any constipation or GERD symptoms. ALLERGIES: Patients list of allergies were reviewed     MEDICATIONS: Ms. Inder Brunner list of medications were reviewed. Her current medications are   Outpatient Medications Prior to Visit   Medication Sig Dispense Refill    sulfamethoxazole-trimethoprim (BACTRIM;SEPTRA) 400-80 MG per tablet TAKE ONE TABLET BY MOUTH TWICE A DAY FOR 7 DAYS 14 tablet 0    topiramate (TOPAMAX) 100 MG tablet Take 1 tablet by mouth 2 times daily 60 tablet 1    Continuous Blood Gluc  (FREESTYLE LISSETT 14 DAY READER) DAYO 1 kit by Does not apply route every 14 days 1 Device 0    Continuous Blood Gluc Sensor (FREESTYLE LISSETT 14 DAY SENSOR) MISC 1 each by Does not apply route every 14 days 4 each 5    insulin glargine (BASAGLAR KWIKPEN) 100 UNIT/ML injection pen Inject 60 Units into the skin 2 times daily 15 mL 5    insulin aspart (NOVOLOG FLEXPEN) 100 UNIT/ML injection pen Inject 5-10 Units into the skin 3 times daily (before meals) 5 pen 4    zolpidem (AMBIEN) 5 MG tablet TAKE ONE TABLET BY MOUTH EVERY NIGHT AT BEDTIME AS NEEDED FOR SLEEP FOR UP TO 30 DAYS 30 tablet 1    oxyCODONE-acetaminophen (PERCOCET) 7.5-325 MG per tablet Take 1 tablet by mouth every 6 hours as needed for Pain (max 3-4 per day) for up to 28 days.  100 tablet 0    QUEtiapine (SEROQUEL) 25 MG tablet Take 1-2 tablets by mouth nightly 60 tablet 0    DULoxetine (CYMBALTA) 60 MG extended release capsule Take 1 capsule by mouth daily 30 capsule 0    omeprazole (PRILOSEC) 20 MG delayed release capsule Take 1 capsule by mouth Daily 30 capsule 0    buPROPion (WELLBUTRIN XL) 150 MG extended release tablet Take 1 tablet by mouth every morning 30 tablet 1    tiZANidine (ZANAFLEX) 4 MG tablet Take 1/2 by mouth in the am, take 1 tablet by mouth in the pm 60 tablet 0    ondansetron (ZOFRAN) 8 MG tablet TAKE 1 TABLET BY MOUTH EVERY 8 HOURS AS NEEDED FOR NAUSEA OR VOMITING 90 tablet 0    RANEXA 1000 MG extended release tablet TAKE 1 TABLET BY MOUTH 2 TIMES DAILY 60 tablet 3    ASPIRIN LOW DOSE 81 MG EC tablet TAKE 1 TABLET BY MOUTH DA JULIEN 30 tablet 3    metoprolol succinate (TOPROL XL) 50 MG extended release tablet TAKE 1 TABLET BY MOUTH DAILY 30 tablet 3    hydrALAZINE (APRESOLINE) 50 MG tablet Take 1 tablet by mouth 2 times daily 60 tablet 5    torsemide (DEMADEX) 20 MG tablet Take 1 tablet by mouth daily 30 tablet 5    isosorbide mononitrate (IMDUR) 30 MG extended release tablet Take 1 tablet by mouth daily 30 tablet 5    amLODIPine (NORVASC) 10 MG tablet Take 0.5 tablets by mouth daily 30 tablet 5    Continuous Blood Gluc  (FREESTYLE LISSETT 14 DAY READER) DAYO TEST THREE TIMES DAILY  250.00  E11.9 1 Device 0    Continuous Blood Gluc Sensor (FREESTYLE LISSETT 14 DAY SENSOR) MISC Test three times daily   250.00 E 11.9 1 each 5    rosuvastatin (CRESTOR) 40 MG tablet Take 1 tablet by mouth nightly 90 tablet 5    nitroGLYCERIN (NITROSTAT) 0.4 MG SL tablet PLACE 1 TABLET UNDER THE TONGUE EVERY 5 MINUTES AS NEEDED FOR CHEST PAIN.  25 tablet 2    ULTICARE MINI PEN NEEDLES 31G X 6 MM MISC USE THREE TIMES A  each 5    budesonide-formoterol (SYMBICORT) 160-4.5 MCG/ACT AERO Inhale 2 puffs into the lungs daily (Patient taking differently: Inhale 2 puffs into the lungs daily as needed ) 11 g 4    albuterol sulfate HFA (PROAIR HFA) 108 (90 Base) MCG/ACT inhaler Inhale 2 puffs into the lungs every 6 hours as needed for Wheezing 1 Inhaler 5    chlorthalidone (HYGROTON) 25 MG tablet TAKE ONE TABLET BY MOUTH DAILY 90 tablet 0    clopidogrel (PLAVIX) 75 MG tablet Take 1 tablet by mouth daily 90 tablet 5    ranitidine (ZANTAC) 150 MG tablet Take 1 tablet by mouth 2 times daily as needed for Heartburn (Patient taking differently: Take 150 mg by mouth 2 times daily ) 60 tablet 3    glucose monitoring kit (FREESTYLE) monitoring kit 1 each by Does not apply route once for 1 dose May dispense glucometer of patients preference along with compatible strips. 1 kit 1    butalbital-aspirin-caffeine (FIORINAL) -40 MG per capsule Take 1 capsule by mouth every 6 hours as needed for Headaches for up to 14 days. . 30 capsule 0     No facility-administered medications prior to visit. SOCIAL/FAMILY/PAST MEDICAL HISTORY: Ms. Ade Alvarez, family and past medical history was reviewed. REVIEW OF SYSTEMS:    Respiratory: Negative for apnea, chest tightness and shortness of breath or change in baseline breathing. Gastrointestinal: Negative for nausea, vomiting, abdominal pain, diarrhea, constipation, blood in stool and abdominal distention. PHYSICAL EXAM:   Nursing note and vitals reviewed. BP (!) 144/74   Pulse 70   Wt 124 lb (56.2 kg)   BMI 24.22 kg/m²   Constitutional: She appears well-developed and well-nourished. No acute distress. Skin: Skin is warm and dry, good turgor. No rash noted. She is not diaphoretic. Cardiovascular: Normal rate, regular rhythm, normal heart sounds, and does not have murmur. Pulmonary/Chest: Effort normal. No respiratory distress. She does not have wheezes in the lung fields. She has no rales. Neurological/Psychiatric:She is alert and oriented to person, place, and time. Coordination is  normal.  Her mood isAppropriate and affect is Flat/blunted and Anxious . IMPRESSION:   1. Chronic pain syndrome    2. Fibromyalgia    3. Chronic painful diabetic neuropathy ([facility] [address]    4. DDD (degenerative disc disease), cervical        PLAN:  Informed verbal consent was obtained  -continue with current opioid regimen   -Adv Biofeedback, relaxation and meditation techniques.  Referral to psychologist for CBT was also discussed with patient  -continue with Percocet but decrease to 3 per day, (states taking 3 only)  -She was advised to increase fluids ( 5-7  glasses of fluid daily), limit caffeine, avoid cheese products, increase dietary fiber, increase activity and exercise as tolerated and relax regularly and enjoy meals   -Walking as tolerated 15-20 minutes daily      Current Outpatient Medications   Medication Sig Dispense Refill    sulfamethoxazole-trimethoprim (BACTRIM;SEPTRA) 400-80 MG per tablet TAKE ONE TABLET BY MOUTH TWICE A DAY FOR 7 DAYS 14 tablet 0    topiramate (TOPAMAX) 100 MG tablet Take 1 tablet by mouth 2 times daily 60 tablet 1    Continuous Blood Gluc  (FREESTYLE LISSETT 14 DAY READER) DAYO 1 kit by Does not apply route every 14 days 1 Device 0    Continuous Blood Gluc Sensor (FREESTYLE LISSETT 14 DAY SENSOR) MISC 1 each by Does not apply route every 14 days 4 each 5    insulin glargine (BASAGLAR KWIKPEN) 100 UNIT/ML injection pen Inject 60 Units into the skin 2 times daily 15 mL 5    insulin aspart (NOVOLOG FLEXPEN) 100 UNIT/ML injection pen Inject 5-10 Units into the skin 3 times daily (before meals) 5 pen 4    zolpidem (AMBIEN) 5 MG tablet TAKE ONE TABLET BY MOUTH EVERY NIGHT AT BEDTIME AS NEEDED FOR SLEEP FOR UP TO 30 DAYS 30 tablet 1    oxyCODONE-acetaminophen (PERCOCET) 7.5-325 MG per tablet Take 1 tablet by mouth every 6 hours as needed for Pain (max 3-4 per day) for up to 28 days.  100 tablet 0    QUEtiapine (SEROQUEL) 25 MG tablet Take 1-2 tablets by mouth nightly 60 tablet 0    DULoxetine (CYMBALTA) 60 MG extended release capsule Take 1 capsule by mouth daily 30 capsule 0    omeprazole (PRILOSEC) 20 MG delayed release capsule Take 1 capsule by mouth Daily 30 capsule 0    buPROPion (WELLBUTRIN XL) 150 MG extended release tablet Take 1 tablet by mouth every morning 30 tablet 1    tiZANidine (ZANAFLEX) 4 MG tablet Take 1/2 by mouth in the am, take 1 tablet by mouth in the pm 60 tablet 0    ondansetron (ZOFRAN) 8 MG tablet TAKE 1 TABLET BY MOUTH EVERY 8 HOURS AS NEEDED FOR NAUSEA OR VOMITING 90 tablet 0    RANEXA 1000 MG extended release tablet TAKE 1 TABLET BY MOUTH 2 TIMES DAILY 60 tablet 3    ASPIRIN LOW (FIORINAL) -40 MG per capsule Take 1 capsule by mouth every 6 hours as needed for Headaches for up to 14 days. . 30 capsule 0     No current facility-administered medications for this visit. I will continue her current medication regimen  which is part of the above treatment schedule. It has been helping with Ms. Meza's chronic  medical problems which for this visit include:   Diagnoses of Chronic pain syndrome, Fibromyalgia, Chronic painful diabetic neuropathy ([address] DDD (degenerative disc disease), cervical, Primary insomnia, Moderate episode of recurrent major depressive disorder (HCC), Gastro-esophageal reflux disease without esophagitis, and Headache, chronic migraine without aura, intractable, with status were pertinent to this visit. Risks and benefits of the medications and other alternative treatments  including no treatment were discussed with the patient. The common side effects of these medications were also explained to the patient. Informed verbal consent was obtained. Goals of current treatment regimen include improvement in pain, restoration of functioning- with focus on improvement in physical performance, general activity, work or disability,emotional distress, health care utilization and  decreased medication consumption. Will continue to monitor progress towards achieving/maintaining therapeutic goals with special emphasis on  1. Improvement in perceived interfernce  of pain with ADL's. Ability to do home exercises independently. Ability to do household chores indoor and/or outdoor work and social and leisure activities. Improve psychosocial and physical functioning. - she is showing progression towards this treatment goal with the current regimen. She was advised against drinking alcohol with the narcotic pain medicines, advised against driving or handling machinery while adjusting the dose of medicines or if having cognitive  issues related to the current medications. Risk of overdose and death, if medicines not taken as prescribed, were also discussed. If the patient develops new symptoms or if the symptoms worsen, the patient should call the office. While transcribing every attempt was made to maintain the accuracy of the note in terms of it's contents,there may have been some errors made inadvertently. Thank you for allowing me to participate in the care of this patient. Puja Sloan MD.    Cc: Marianne Martinez MD    I, Velvet Canavan, scribing for in the presence  of Dr. Puja Sloan.   06/21/19  1:57 PM  Rowan Santana Assistant    I, Dr. Puja Sloan, personally performed the services described in this documentation as scribed by  Velvet Canavan ,MA in my presence and it is both accurate and complete

## 2019-06-25 NOTE — TELEPHONE ENCOUNTER
Received DENIAL for FreeStyle [place] 14 Day [place] device as it is a PLAN EXCLUSION. Denial letter attached. Please advise patient. Thank you.

## 2019-06-30 DIAGNOSIS — G89.4 CHRONIC PAIN SYNDROME: ICD-10-CM

## 2019-07-01 ENCOUNTER — TELEPHONE (OUTPATIENT)
Dept: PRIMARY CARE CLINIC | Age: 64
End: 2019-07-01

## 2019-07-01 DIAGNOSIS — E11.8 TYPE 2 DIABETES MELLITUS WITH COMPLICATION, WITH LONG-TERM CURRENT USE OF INSULIN (HCC): ICD-10-CM

## 2019-07-01 DIAGNOSIS — E11.8 TYPE 2 DIABETES MELLITUS WITH COMPLICATION, WITH LONG-TERM CURRENT USE OF INSULIN (HCC): Primary | ICD-10-CM

## 2019-07-01 DIAGNOSIS — Z79.4 TYPE 2 DIABETES MELLITUS WITH COMPLICATION, WITH LONG-TERM CURRENT USE OF INSULIN (HCC): ICD-10-CM

## 2019-07-01 DIAGNOSIS — Z79.4 TYPE 2 DIABETES MELLITUS WITH COMPLICATION, WITH LONG-TERM CURRENT USE OF INSULIN (HCC): Primary | ICD-10-CM

## 2019-07-01 DIAGNOSIS — I10 ESSENTIAL HYPERTENSION: ICD-10-CM

## 2019-07-01 RX ORDER — FUROSEMIDE 80 MG
80 TABLET ORAL DAILY
Qty: 30 TABLET | Refills: 0 | Status: ON HOLD | OUTPATIENT
Start: 2019-07-01 | End: 2019-07-30 | Stop reason: ALTCHOICE

## 2019-07-01 RX ORDER — LANCETS 30 GAUGE
EACH MISCELLANEOUS
Qty: 100 EACH | Refills: 5 | Status: ON HOLD | OUTPATIENT
Start: 2019-07-01 | End: 2019-07-28

## 2019-07-01 RX ORDER — TOPIRAMATE 100 MG/1
100 TABLET, FILM COATED ORAL 2 TIMES DAILY
Qty: 60 TABLET | Refills: 1 | Status: SHIPPED | OUTPATIENT
Start: 2019-07-01 | End: 2019-08-29 | Stop reason: SDUPTHER

## 2019-07-01 RX ORDER — BLOOD-GLUCOSE METER
1 KIT MISCELLANEOUS ONCE
Qty: 1 KIT | Refills: 1 | Status: SHIPPED | OUTPATIENT
Start: 2019-07-01 | End: 2019-07-01 | Stop reason: SDUPTHER

## 2019-07-01 NOTE — TELEPHONE ENCOUNTER
Requested Prescriptions     Pending Prescriptions Disp Refills    glucose monitoring kit (FREESTYLE) monitoring kit 1 kit 1     Si each by Does not apply route once for 1 dose May dispense glucometer of patients preference along with compatible strips. PLEASE DISPENSE INSURANCE PREFERRED SUPPLIES

## 2019-07-02 RX ORDER — FUROSEMIDE 20 MG/1
20 TABLET ORAL DAILY
Qty: 30 TABLET | Refills: 5 | Status: ON HOLD | OUTPATIENT
Start: 2019-07-02 | End: 2019-08-01 | Stop reason: HOSPADM

## 2019-07-08 ENCOUNTER — TELEPHONE (OUTPATIENT)
Dept: PRIMARY CARE CLINIC | Age: 64
End: 2019-07-08

## 2019-07-09 DIAGNOSIS — G44.89 OTHER HEADACHE SYNDROME: Primary | ICD-10-CM

## 2019-07-09 RX ORDER — LOPERAMIDE HYDROCHLORIDE 2 MG/1
2 CAPSULE ORAL 2 TIMES DAILY PRN
Qty: 20 CAPSULE | Refills: 0 | Status: SHIPPED | OUTPATIENT
Start: 2019-07-09 | End: 2019-07-19

## 2019-07-11 DIAGNOSIS — G44.89 OTHER HEADACHE SYNDROME: ICD-10-CM

## 2019-07-11 RX ORDER — ATORVASTATIN CALCIUM 80 MG/1
TABLET, FILM COATED ORAL
Qty: 90 TABLET | Refills: 3 | Status: SHIPPED | OUTPATIENT
Start: 2019-07-11 | End: 2019-08-29 | Stop reason: SDUPTHER

## 2019-07-12 ENCOUNTER — APPOINTMENT (OUTPATIENT)
Dept: GENERAL RADIOLOGY | Age: 64
End: 2019-07-12
Payer: COMMERCIAL

## 2019-07-12 ENCOUNTER — HOSPITAL ENCOUNTER (OUTPATIENT)
Age: 64
Setting detail: OBSERVATION
Discharge: HOME | End: 2019-07-14
Attending: EMERGENCY MEDICINE | Admitting: INTERNAL MEDICINE
Payer: COMMERCIAL

## 2019-07-12 DIAGNOSIS — I20.0 UNSTABLE ANGINA (HCC): Primary | ICD-10-CM

## 2019-07-12 DIAGNOSIS — R94.31 NONSPECIFIC ST-T WAVE ELECTROCARDIOGRAPHIC CHANGES: ICD-10-CM

## 2019-07-12 DIAGNOSIS — R61 DIAPHORESIS: ICD-10-CM

## 2019-07-12 DIAGNOSIS — R07.9 CHEST PAIN, UNSPECIFIED TYPE: ICD-10-CM

## 2019-07-12 LAB
A/G RATIO: 0.9 (ref 1.1–2.2)
ALBUMIN SERPL-MCNC: 3.5 G/DL (ref 3.4–5)
ALP BLD-CCNC: 119 U/L (ref 40–129)
ALT SERPL-CCNC: 20 U/L (ref 10–40)
ANION GAP SERPL CALCULATED.3IONS-SCNC: 12 MMOL/L (ref 3–16)
APTT: 31.1 SEC (ref 26–36)
AST SERPL-CCNC: 34 U/L (ref 15–37)
BASOPHILS ABSOLUTE: 0 K/UL (ref 0–0.2)
BASOPHILS RELATIVE PERCENT: 0.5 %
BILIRUB SERPL-MCNC: <0.2 MG/DL (ref 0–1)
BUN BLDV-MCNC: 21 MG/DL (ref 7–20)
CALCIUM SERPL-MCNC: 9 MG/DL (ref 8.3–10.6)
CHLORIDE BLD-SCNC: 110 MMOL/L (ref 99–110)
CO2: 18 MMOL/L (ref 21–32)
CREAT SERPL-MCNC: 1.4 MG/DL (ref 0.6–1.2)
EOSINOPHILS ABSOLUTE: 0.1 K/UL (ref 0–0.6)
EOSINOPHILS RELATIVE PERCENT: 1.9 %
GFR AFRICAN AMERICAN: 46
GFR NON-AFRICAN AMERICAN: 38
GLOBULIN: 3.9 G/DL
GLUCOSE BLD-MCNC: 131 MG/DL (ref 70–99)
HCT VFR BLD CALC: 34.5 % (ref 36–48)
HEMOGLOBIN: 11.2 G/DL (ref 12–16)
INR BLD: 0.93 (ref 0.86–1.14)
LYMPHOCYTES ABSOLUTE: 1.8 K/UL (ref 1–5.1)
LYMPHOCYTES RELATIVE PERCENT: 29.4 %
MCH RBC QN AUTO: 33.1 PG (ref 26–34)
MCHC RBC AUTO-ENTMCNC: 32.5 G/DL (ref 31–36)
MCV RBC AUTO: 102 FL (ref 80–100)
MONOCYTES ABSOLUTE: 0.4 K/UL (ref 0–1.3)
MONOCYTES RELATIVE PERCENT: 6.9 %
NEUTROPHILS ABSOLUTE: 3.8 K/UL (ref 1.7–7.7)
NEUTROPHILS RELATIVE PERCENT: 61.3 %
PDW BLD-RTO: 14.8 % (ref 12.4–15.4)
PLATELET # BLD: 198 K/UL (ref 135–450)
PLATELET SLIDE REVIEW: ADEQUATE
PMV BLD AUTO: 9 FL (ref 5–10.5)
POTASSIUM SERPL-SCNC: 4.5 MMOL/L (ref 3.5–5.1)
PROTHROMBIN TIME: 10.6 SEC (ref 9.8–13)
RBC # BLD: 3.38 M/UL (ref 4–5.2)
SEDIMENTATION RATE, ERYTHROCYTE: 78 MM/HR (ref 0–30)
SLIDE REVIEW: ABNORMAL
SODIUM BLD-SCNC: 140 MMOL/L (ref 136–145)
TOTAL PROTEIN: 7.4 G/DL (ref 6.4–8.2)
TROPONIN: <0.01 NG/ML
WBC # BLD: 6.3 K/UL (ref 4–11)

## 2019-07-12 PROCEDURE — 71046 X-RAY EXAM CHEST 2 VIEWS: CPT

## 2019-07-12 PROCEDURE — 93005 ELECTROCARDIOGRAM TRACING: CPT | Performed by: EMERGENCY MEDICINE

## 2019-07-12 PROCEDURE — 85610 PROTHROMBIN TIME: CPT

## 2019-07-12 PROCEDURE — 6360000002 HC RX W HCPCS: Performed by: EMERGENCY MEDICINE

## 2019-07-12 PROCEDURE — 96365 THER/PROPH/DIAG IV INF INIT: CPT

## 2019-07-12 PROCEDURE — 36415 COLL VENOUS BLD VENIPUNCTURE: CPT

## 2019-07-12 PROCEDURE — 80053 COMPREHEN METABOLIC PANEL: CPT

## 2019-07-12 PROCEDURE — 96374 THER/PROPH/DIAG INJ IV PUSH: CPT

## 2019-07-12 PROCEDURE — 96375 TX/PRO/DX INJ NEW DRUG ADDON: CPT

## 2019-07-12 PROCEDURE — 85730 THROMBOPLASTIN TIME PARTIAL: CPT

## 2019-07-12 PROCEDURE — 84484 ASSAY OF TROPONIN QUANT: CPT

## 2019-07-12 PROCEDURE — 99285 EMERGENCY DEPT VISIT HI MDM: CPT

## 2019-07-12 PROCEDURE — 85025 COMPLETE CBC W/AUTO DIFF WBC: CPT

## 2019-07-12 RX ORDER — HEPARIN SODIUM 1000 [USP'U]/ML
60 INJECTION, SOLUTION INTRAVENOUS; SUBCUTANEOUS ONCE
Status: COMPLETED | OUTPATIENT
Start: 2019-07-12 | End: 2019-07-12

## 2019-07-12 RX ORDER — HEPARIN SODIUM 1000 [USP'U]/ML
60 INJECTION, SOLUTION INTRAVENOUS; SUBCUTANEOUS PRN
Status: DISCONTINUED | OUTPATIENT
Start: 2019-07-12 | End: 2019-07-12

## 2019-07-12 RX ORDER — MORPHINE SULFATE 10 MG/ML
6 INJECTION, SOLUTION INTRAMUSCULAR; INTRAVENOUS ONCE
Status: COMPLETED | OUTPATIENT
Start: 2019-07-12 | End: 2019-07-12

## 2019-07-12 RX ORDER — HEPARIN SODIUM 10000 [USP'U]/100ML
7.2 INJECTION, SOLUTION INTRAVENOUS CONTINUOUS
Status: DISCONTINUED | OUTPATIENT
Start: 2019-07-12 | End: 2019-07-13 | Stop reason: SDUPTHER

## 2019-07-12 RX ORDER — HEPARIN SODIUM 1000 [USP'U]/ML
30 INJECTION, SOLUTION INTRAVENOUS; SUBCUTANEOUS PRN
Status: DISCONTINUED | OUTPATIENT
Start: 2019-07-12 | End: 2019-07-12

## 2019-07-12 RX ADMIN — MORPHINE SULFATE 6 MG: 10 INJECTION, SOLUTION INTRAMUSCULAR; INTRAVENOUS at 23:00

## 2019-07-12 RX ADMIN — HEPARIN SODIUM 3600 UNITS: 1000 INJECTION, SOLUTION INTRAVENOUS; SUBCUTANEOUS at 23:00

## 2019-07-12 ASSESSMENT — PAIN - FUNCTIONAL ASSESSMENT: PAIN_FUNCTIONAL_ASSESSMENT: ACTIVITIES ARE NOT PREVENTED

## 2019-07-12 ASSESSMENT — PAIN SCALES - GENERAL
PAINLEVEL_OUTOF10: 8
PAINLEVEL_OUTOF10: 8

## 2019-07-12 ASSESSMENT — PAIN DESCRIPTION - ONSET: ONSET: ON-GOING

## 2019-07-12 ASSESSMENT — PAIN DESCRIPTION - ORIENTATION: ORIENTATION: MID

## 2019-07-12 ASSESSMENT — PAIN DESCRIPTION - FREQUENCY: FREQUENCY: CONTINUOUS

## 2019-07-12 ASSESSMENT — PAIN DESCRIPTION - PROGRESSION: CLINICAL_PROGRESSION: NOT CHANGED

## 2019-07-12 ASSESSMENT — PAIN DESCRIPTION - PAIN TYPE: TYPE: ACUTE PAIN

## 2019-07-12 ASSESSMENT — PAIN DESCRIPTION - LOCATION: LOCATION: CHEST

## 2019-07-12 ASSESSMENT — PAIN DESCRIPTION - DESCRIPTORS: DESCRIPTORS: ACHING

## 2019-07-13 PROBLEM — I20.0 UNSTABLE ANGINA (HCC): Status: ACTIVE | Noted: 2019-07-13

## 2019-07-13 PROBLEM — I20.0 UNSTABLE ANGINA (HCC): Status: RESOLVED | Noted: 2018-12-20 | Resolved: 2019-07-13

## 2019-07-13 LAB
APTT: 120.6 SEC (ref 26–36)
APTT: 68.1 SEC (ref 26–36)
EKG ATRIAL RATE: 73 BPM
EKG DIAGNOSIS: NORMAL
EKG P AXIS: 6 DEGREES
EKG P-R INTERVAL: 118 MS
EKG Q-T INTERVAL: 384 MS
EKG QRS DURATION: 90 MS
EKG QTC CALCULATION (BAZETT): 423 MS
EKG R AXIS: 38 DEGREES
EKG T AXIS: 192 DEGREES
EKG VENTRICULAR RATE: 73 BPM
GLUCOSE BLD-MCNC: 253 MG/DL (ref 70–99)
GLUCOSE BLD-MCNC: 280 MG/DL (ref 70–99)
HCT VFR BLD CALC: 33.8 % (ref 36–48)
HEMOGLOBIN: 11 G/DL (ref 12–16)
MCH RBC QN AUTO: 33.5 PG (ref 26–34)
MCHC RBC AUTO-ENTMCNC: 32.6 G/DL (ref 31–36)
MCV RBC AUTO: 102.8 FL (ref 80–100)
PDW BLD-RTO: 14.9 % (ref 12.4–15.4)
PERFORMED ON: ABNORMAL
PERFORMED ON: ABNORMAL
PLATELET # BLD: 243 K/UL (ref 135–450)
PMV BLD AUTO: 8.8 FL (ref 5–10.5)
RBC # BLD: 3.29 M/UL (ref 4–5.2)
TROPONIN: <0.01 NG/ML
TROPONIN: <0.01 NG/ML
WBC # BLD: 5.2 K/UL (ref 4–11)

## 2019-07-13 PROCEDURE — G0378 HOSPITAL OBSERVATION PER HR: HCPCS

## 2019-07-13 PROCEDURE — 94760 N-INVAS EAR/PLS OXIMETRY 1: CPT

## 2019-07-13 PROCEDURE — 6360000002 HC RX W HCPCS: Performed by: INTERNAL MEDICINE

## 2019-07-13 PROCEDURE — 85027 COMPLETE CBC AUTOMATED: CPT

## 2019-07-13 PROCEDURE — 99214 OFFICE O/P EST MOD 30 MIN: CPT | Performed by: INTERNAL MEDICINE

## 2019-07-13 PROCEDURE — 36415 COLL VENOUS BLD VENIPUNCTURE: CPT

## 2019-07-13 PROCEDURE — 6370000000 HC RX 637 (ALT 250 FOR IP): Performed by: HOSPITALIST

## 2019-07-13 PROCEDURE — 6370000000 HC RX 637 (ALT 250 FOR IP): Performed by: NURSE PRACTITIONER

## 2019-07-13 PROCEDURE — 94640 AIRWAY INHALATION TREATMENT: CPT

## 2019-07-13 PROCEDURE — 96376 TX/PRO/DX INJ SAME DRUG ADON: CPT

## 2019-07-13 PROCEDURE — 6370000000 HC RX 637 (ALT 250 FOR IP): Performed by: INTERNAL MEDICINE

## 2019-07-13 PROCEDURE — 96375 TX/PRO/DX INJ NEW DRUG ADDON: CPT

## 2019-07-13 PROCEDURE — 83036 HEMOGLOBIN GLYCOSYLATED A1C: CPT

## 2019-07-13 PROCEDURE — 6360000002 HC RX W HCPCS

## 2019-07-13 PROCEDURE — 85730 THROMBOPLASTIN TIME PARTIAL: CPT

## 2019-07-13 PROCEDURE — 93010 ELECTROCARDIOGRAM REPORT: CPT | Performed by: INTERNAL MEDICINE

## 2019-07-13 PROCEDURE — 96366 THER/PROPH/DIAG IV INF ADDON: CPT

## 2019-07-13 PROCEDURE — 84484 ASSAY OF TROPONIN QUANT: CPT

## 2019-07-13 PROCEDURE — 2580000003 HC RX 258: Performed by: INTERNAL MEDICINE

## 2019-07-13 RX ORDER — NICOTINE POLACRILEX 4 MG
15 LOZENGE BUCCAL PRN
Status: DISCONTINUED | OUTPATIENT
Start: 2019-07-13 | End: 2019-07-13 | Stop reason: SDUPTHER

## 2019-07-13 RX ORDER — CLOPIDOGREL BISULFATE 75 MG/1
75 TABLET ORAL DAILY
Status: DISCONTINUED | OUTPATIENT
Start: 2019-07-13 | End: 2019-07-14 | Stop reason: HOSPADM

## 2019-07-13 RX ORDER — MORPHINE SULFATE 2 MG/ML
INJECTION, SOLUTION INTRAMUSCULAR; INTRAVENOUS
Status: COMPLETED
Start: 2019-07-13 | End: 2019-07-13

## 2019-07-13 RX ORDER — ATORVASTATIN CALCIUM 40 MG/1
40 TABLET, FILM COATED ORAL NIGHTLY
Status: DISCONTINUED | OUTPATIENT
Start: 2019-07-13 | End: 2019-07-13 | Stop reason: SDUPTHER

## 2019-07-13 RX ORDER — DEXTROSE MONOHYDRATE 50 MG/ML
100 INJECTION, SOLUTION INTRAVENOUS PRN
Status: DISCONTINUED | OUTPATIENT
Start: 2019-07-13 | End: 2019-07-13 | Stop reason: SDUPTHER

## 2019-07-13 RX ORDER — TOPIRAMATE 100 MG/1
100 TABLET, FILM COATED ORAL 2 TIMES DAILY
Status: DISCONTINUED | OUTPATIENT
Start: 2019-07-13 | End: 2019-07-14 | Stop reason: HOSPADM

## 2019-07-13 RX ORDER — CHLORTHALIDONE 25 MG/1
25 TABLET ORAL DAILY
Status: DISCONTINUED | OUTPATIENT
Start: 2019-07-13 | End: 2019-07-14 | Stop reason: HOSPADM

## 2019-07-13 RX ORDER — HEPARIN SODIUM 1000 [USP'U]/ML
30 INJECTION, SOLUTION INTRAVENOUS; SUBCUTANEOUS PRN
Status: DISCONTINUED | OUTPATIENT
Start: 2019-07-13 | End: 2019-07-13

## 2019-07-13 RX ORDER — HEPARIN SODIUM 10000 [USP'U]/100ML
5 INJECTION, SOLUTION INTRAVENOUS CONTINUOUS
Status: DISCONTINUED | OUTPATIENT
Start: 2019-07-13 | End: 2019-07-13

## 2019-07-13 RX ORDER — DEXTROSE MONOHYDRATE 25 G/50ML
12.5 INJECTION, SOLUTION INTRAVENOUS PRN
Status: DISCONTINUED | OUTPATIENT
Start: 2019-07-13 | End: 2019-07-14 | Stop reason: HOSPADM

## 2019-07-13 RX ORDER — HYDRALAZINE HYDROCHLORIDE 50 MG/1
50 TABLET, FILM COATED ORAL 2 TIMES DAILY
Status: DISCONTINUED | OUTPATIENT
Start: 2019-07-13 | End: 2019-07-14 | Stop reason: HOSPADM

## 2019-07-13 RX ORDER — PANTOPRAZOLE SODIUM 40 MG/1
40 TABLET, DELAYED RELEASE ORAL
Status: DISCONTINUED | OUTPATIENT
Start: 2019-07-13 | End: 2019-07-14 | Stop reason: HOSPADM

## 2019-07-13 RX ORDER — BUPROPION HYDROCHLORIDE 150 MG/1
150 TABLET ORAL EVERY MORNING
Status: DISCONTINUED | OUTPATIENT
Start: 2019-07-13 | End: 2019-07-14 | Stop reason: HOSPADM

## 2019-07-13 RX ORDER — INSULIN GLARGINE 100 [IU]/ML
60 INJECTION, SOLUTION SUBCUTANEOUS 2 TIMES DAILY
Status: DISCONTINUED | OUTPATIENT
Start: 2019-07-13 | End: 2019-07-13 | Stop reason: SDUPTHER

## 2019-07-13 RX ORDER — DULOXETIN HYDROCHLORIDE 60 MG/1
60 CAPSULE, DELAYED RELEASE ORAL DAILY
Status: DISCONTINUED | OUTPATIENT
Start: 2019-07-13 | End: 2019-07-14 | Stop reason: HOSPADM

## 2019-07-13 RX ORDER — ASPIRIN 81 MG/1
81 TABLET ORAL DAILY
Status: DISCONTINUED | OUTPATIENT
Start: 2019-07-13 | End: 2019-07-14 | Stop reason: HOSPADM

## 2019-07-13 RX ORDER — SODIUM CHLORIDE 0.9 % (FLUSH) 0.9 %
10 SYRINGE (ML) INJECTION EVERY 12 HOURS SCHEDULED
Status: DISCONTINUED | OUTPATIENT
Start: 2019-07-13 | End: 2019-07-14 | Stop reason: HOSPADM

## 2019-07-13 RX ORDER — INSULIN GLARGINE 100 [IU]/ML
60 INJECTION, SOLUTION SUBCUTANEOUS 2 TIMES DAILY
Status: DISCONTINUED | OUTPATIENT
Start: 2019-07-13 | End: 2019-07-14 | Stop reason: HOSPADM

## 2019-07-13 RX ORDER — DEXTROSE MONOHYDRATE 25 G/50ML
12.5 INJECTION, SOLUTION INTRAVENOUS PRN
Status: DISCONTINUED | OUTPATIENT
Start: 2019-07-13 | End: 2019-07-13 | Stop reason: SDUPTHER

## 2019-07-13 RX ORDER — ALBUTEROL SULFATE 90 UG/1
2 AEROSOL, METERED RESPIRATORY (INHALATION) EVERY 6 HOURS PRN
Status: DISCONTINUED | OUTPATIENT
Start: 2019-07-13 | End: 2019-07-14 | Stop reason: HOSPADM

## 2019-07-13 RX ORDER — QUETIAPINE FUMARATE 25 MG/1
50 TABLET, FILM COATED ORAL NIGHTLY
Status: DISCONTINUED | OUTPATIENT
Start: 2019-07-13 | End: 2019-07-14 | Stop reason: HOSPADM

## 2019-07-13 RX ORDER — ONDANSETRON 2 MG/ML
4 INJECTION INTRAMUSCULAR; INTRAVENOUS EVERY 6 HOURS PRN
Status: DISCONTINUED | OUTPATIENT
Start: 2019-07-13 | End: 2019-07-14 | Stop reason: HOSPADM

## 2019-07-13 RX ORDER — ATORVASTATIN CALCIUM 80 MG/1
80 TABLET, FILM COATED ORAL NIGHTLY
Status: DISCONTINUED | OUTPATIENT
Start: 2019-07-13 | End: 2019-07-14 | Stop reason: HOSPADM

## 2019-07-13 RX ORDER — HEPARIN SODIUM 1000 [USP'U]/ML
60 INJECTION, SOLUTION INTRAVENOUS; SUBCUTANEOUS ONCE
Status: DISCONTINUED | OUTPATIENT
Start: 2019-07-13 | End: 2019-07-13

## 2019-07-13 RX ORDER — NICOTINE POLACRILEX 4 MG
15 LOZENGE BUCCAL PRN
Status: DISCONTINUED | OUTPATIENT
Start: 2019-07-13 | End: 2019-07-14 | Stop reason: HOSPADM

## 2019-07-13 RX ORDER — DEXTROSE MONOHYDRATE 50 MG/ML
100 INJECTION, SOLUTION INTRAVENOUS PRN
Status: DISCONTINUED | OUTPATIENT
Start: 2019-07-13 | End: 2019-07-14 | Stop reason: HOSPADM

## 2019-07-13 RX ORDER — NITROGLYCERIN 0.4 MG/1
0.4 TABLET SUBLINGUAL EVERY 5 MIN PRN
Status: DISCONTINUED | OUTPATIENT
Start: 2019-07-13 | End: 2019-07-14 | Stop reason: HOSPADM

## 2019-07-13 RX ORDER — AMLODIPINE BESYLATE 5 MG/1
5 TABLET ORAL DAILY
Status: DISCONTINUED | OUTPATIENT
Start: 2019-07-13 | End: 2019-07-14 | Stop reason: HOSPADM

## 2019-07-13 RX ORDER — POTASSIUM CHLORIDE 7.45 MG/ML
10 INJECTION INTRAVENOUS PRN
Status: DISCONTINUED | OUTPATIENT
Start: 2019-07-13 | End: 2019-07-14 | Stop reason: HOSPADM

## 2019-07-13 RX ORDER — OXYCODONE AND ACETAMINOPHEN 7.5; 325 MG/1; MG/1
1 TABLET ORAL EVERY 6 HOURS PRN
Status: DISCONTINUED | OUTPATIENT
Start: 2019-07-13 | End: 2019-07-14 | Stop reason: HOSPADM

## 2019-07-13 RX ORDER — ASPIRIN 81 MG/1
81 TABLET, CHEWABLE ORAL DAILY
Status: DISCONTINUED | OUTPATIENT
Start: 2019-07-14 | End: 2019-07-13 | Stop reason: SDUPTHER

## 2019-07-13 RX ORDER — RANOLAZINE 500 MG/1
1000 TABLET, EXTENDED RELEASE ORAL 2 TIMES DAILY
Status: DISCONTINUED | OUTPATIENT
Start: 2019-07-13 | End: 2019-07-14 | Stop reason: HOSPADM

## 2019-07-13 RX ORDER — MORPHINE SULFATE 2 MG/ML
2 INJECTION, SOLUTION INTRAMUSCULAR; INTRAVENOUS
Status: DISCONTINUED | OUTPATIENT
Start: 2019-07-13 | End: 2019-07-13

## 2019-07-13 RX ORDER — MAGNESIUM SULFATE 1 G/100ML
1 INJECTION INTRAVENOUS PRN
Status: DISCONTINUED | OUTPATIENT
Start: 2019-07-13 | End: 2019-07-14 | Stop reason: HOSPADM

## 2019-07-13 RX ORDER — METOPROLOL SUCCINATE 50 MG/1
50 TABLET, EXTENDED RELEASE ORAL DAILY
Status: DISCONTINUED | OUTPATIENT
Start: 2019-07-13 | End: 2019-07-14 | Stop reason: HOSPADM

## 2019-07-13 RX ORDER — ISOSORBIDE MONONITRATE 30 MG/1
30 TABLET, EXTENDED RELEASE ORAL DAILY
Status: DISCONTINUED | OUTPATIENT
Start: 2019-07-13 | End: 2019-07-14 | Stop reason: HOSPADM

## 2019-07-13 RX ORDER — HEPARIN SODIUM 1000 [USP'U]/ML
60 INJECTION, SOLUTION INTRAVENOUS; SUBCUTANEOUS PRN
Status: DISCONTINUED | OUTPATIENT
Start: 2019-07-13 | End: 2019-07-13

## 2019-07-13 RX ORDER — SODIUM CHLORIDE 0.9 % (FLUSH) 0.9 %
10 SYRINGE (ML) INJECTION PRN
Status: DISCONTINUED | OUTPATIENT
Start: 2019-07-13 | End: 2019-07-14 | Stop reason: HOSPADM

## 2019-07-13 RX ADMIN — INSULIN LISPRO 6 UNITS: 100 INJECTION, SOLUTION INTRAVENOUS; SUBCUTANEOUS at 18:41

## 2019-07-13 RX ADMIN — AMLODIPINE BESYLATE 5 MG: 5 TABLET ORAL at 09:01

## 2019-07-13 RX ADMIN — DULOXETINE HYDROCHLORIDE 60 MG: 60 CAPSULE, DELAYED RELEASE ORAL at 09:01

## 2019-07-13 RX ADMIN — MORPHINE SULFATE 2 MG: 2 INJECTION, SOLUTION INTRAMUSCULAR; INTRAVENOUS at 06:16

## 2019-07-13 RX ADMIN — QUETIAPINE FUMARATE 50 MG: 25 TABLET ORAL at 23:07

## 2019-07-13 RX ADMIN — ATORVASTATIN CALCIUM 80 MG: 80 TABLET, FILM COATED ORAL at 21:29

## 2019-07-13 RX ADMIN — NITROGLYCERIN 0.4 MG: 0.4 TABLET, ORALLY DISINTEGRATING SUBLINGUAL at 08:20

## 2019-07-13 RX ADMIN — MOMETASONE FUROATE AND FORMOTEROL FUMARATE DIHYDRATE 2 PUFF: 200; 5 AEROSOL RESPIRATORY (INHALATION) at 08:31

## 2019-07-13 RX ADMIN — RANOLAZINE 1000 MG: 500 TABLET, FILM COATED, EXTENDED RELEASE ORAL at 09:00

## 2019-07-13 RX ADMIN — MOMETASONE FUROATE AND FORMOTEROL FUMARATE DIHYDRATE 2 PUFF: 200; 5 AEROSOL RESPIRATORY (INHALATION) at 19:52

## 2019-07-13 RX ADMIN — TOPIRAMATE 100 MG: 100 TABLET, FILM COATED ORAL at 21:32

## 2019-07-13 RX ADMIN — ASPIRIN 81 MG: 81 TABLET, COATED ORAL at 09:01

## 2019-07-13 RX ADMIN — ISOSORBIDE MONONITRATE 30 MG: 30 TABLET, EXTENDED RELEASE ORAL at 09:01

## 2019-07-13 RX ADMIN — CHLORTHALIDONE 25 MG: 25 TABLET ORAL at 09:01

## 2019-07-13 RX ADMIN — CLOPIDOGREL BISULFATE 75 MG: 75 TABLET ORAL at 09:01

## 2019-07-13 RX ADMIN — SODIUM CHLORIDE, PRESERVATIVE FREE 10 ML: 5 INJECTION INTRAVENOUS at 22:54

## 2019-07-13 RX ADMIN — TOPIRAMATE 100 MG: 100 TABLET, FILM COATED ORAL at 09:01

## 2019-07-13 RX ADMIN — ONDANSETRON 4 MG: 2 INJECTION INTRAMUSCULAR; INTRAVENOUS at 04:54

## 2019-07-13 RX ADMIN — INSULIN GLARGINE 60 UNITS: 100 INJECTION, SOLUTION SUBCUTANEOUS at 21:32

## 2019-07-13 RX ADMIN — HYDRALAZINE HYDROCHLORIDE 50 MG: 50 TABLET, FILM COATED ORAL at 09:01

## 2019-07-13 RX ADMIN — METOPROLOL SUCCINATE 50 MG: 50 TABLET, EXTENDED RELEASE ORAL at 09:01

## 2019-07-13 RX ADMIN — INSULIN LISPRO 3 UNITS: 100 INJECTION, SOLUTION INTRAVENOUS; SUBCUTANEOUS at 21:43

## 2019-07-13 RX ADMIN — MORPHINE SULFATE: 2 INJECTION, SOLUTION INTRAMUSCULAR; INTRAVENOUS at 01:15

## 2019-07-13 RX ADMIN — PANTOPRAZOLE SODIUM 40 MG: 40 TABLET, DELAYED RELEASE ORAL at 06:15

## 2019-07-13 RX ADMIN — MORPHINE SULFATE 2 MG: 2 INJECTION, SOLUTION INTRAMUSCULAR; INTRAVENOUS at 16:25

## 2019-07-13 RX ADMIN — HYDRALAZINE HYDROCHLORIDE 50 MG: 50 TABLET, FILM COATED ORAL at 21:32

## 2019-07-13 RX ADMIN — INSULIN GLARGINE 60 UNITS: 100 INJECTION, SOLUTION SUBCUTANEOUS at 09:01

## 2019-07-13 RX ADMIN — OXYCODONE HYDROCHLORIDE AND ACETAMINOPHEN 1 TABLET: 7.5; 325 TABLET ORAL at 21:29

## 2019-07-13 RX ADMIN — RANOLAZINE 1000 MG: 500 TABLET, FILM COATED, EXTENDED RELEASE ORAL at 21:32

## 2019-07-13 RX ADMIN — OXYCODONE HYDROCHLORIDE AND ACETAMINOPHEN 1 TABLET: 7.5; 325 TABLET ORAL at 12:41

## 2019-07-13 RX ADMIN — BUPROPION HYDROCHLORIDE 150 MG: 150 TABLET, FILM COATED, EXTENDED RELEASE ORAL at 09:00

## 2019-07-13 ASSESSMENT — PAIN SCALES - GENERAL
PAINLEVEL_OUTOF10: 0
PAINLEVEL_OUTOF10: 10
PAINLEVEL_OUTOF10: 8
PAINLEVEL_OUTOF10: 8
PAINLEVEL_OUTOF10: 6
PAINLEVEL_OUTOF10: 7
PAINLEVEL_OUTOF10: 0
PAINLEVEL_OUTOF10: 7
PAINLEVEL_OUTOF10: 6
PAINLEVEL_OUTOF10: 8
PAINLEVEL_OUTOF10: 0

## 2019-07-13 ASSESSMENT — PAIN DESCRIPTION - DESCRIPTORS
DESCRIPTORS: ACHING
DESCRIPTORS: HEAVINESS
DESCRIPTORS: ACHING
DESCRIPTORS: SHARP
DESCRIPTORS: ACHING
DESCRIPTORS: ACHING

## 2019-07-13 ASSESSMENT — PAIN DESCRIPTION - ORIENTATION
ORIENTATION: RIGHT
ORIENTATION: OTHER (COMMENT)
ORIENTATION: RIGHT
ORIENTATION: MID
ORIENTATION: MID

## 2019-07-13 ASSESSMENT — PAIN - FUNCTIONAL ASSESSMENT
PAIN_FUNCTIONAL_ASSESSMENT: ACTIVITIES ARE NOT PREVENTED
PAIN_FUNCTIONAL_ASSESSMENT: PREVENTS OR INTERFERES SOME ACTIVE ACTIVITIES AND ADLS
PAIN_FUNCTIONAL_ASSESSMENT: ACTIVITIES ARE NOT PREVENTED
PAIN_FUNCTIONAL_ASSESSMENT: ACTIVITIES ARE NOT PREVENTED

## 2019-07-13 ASSESSMENT — PAIN DESCRIPTION - ONSET
ONSET: ON-GOING

## 2019-07-13 ASSESSMENT — PAIN DESCRIPTION - PAIN TYPE
TYPE: ACUTE PAIN
TYPE: CHRONIC PAIN
TYPE: ACUTE PAIN

## 2019-07-13 ASSESSMENT — PAIN DESCRIPTION - FREQUENCY
FREQUENCY: CONTINUOUS

## 2019-07-13 ASSESSMENT — PAIN DESCRIPTION - LOCATION
LOCATION: ARM;SHOULDER
LOCATION: BACK;CHEST
LOCATION: CHEST;ARM
LOCATION: CHEST
LOCATION: GENERALIZED
LOCATION: CHEST

## 2019-07-13 ASSESSMENT — PAIN DESCRIPTION - PROGRESSION
CLINICAL_PROGRESSION: GRADUALLY IMPROVING
CLINICAL_PROGRESSION: GRADUALLY IMPROVING
CLINICAL_PROGRESSION: NOT CHANGED
CLINICAL_PROGRESSION: GRADUALLY IMPROVING
CLINICAL_PROGRESSION: GRADUALLY WORSENING
CLINICAL_PROGRESSION: GRADUALLY WORSENING

## 2019-07-13 NOTE — PROGRESS NOTES
Clinical Pharmacy Note  Heparin Dosing       Lab Results   Component Value Date    APTT 120.6 07/13/2019     Lab Results   Component Value Date    HGB 11.0 07/13/2019    HCT 33.8 07/13/2019     07/13/2019    INR 0.93 07/12/2019       Current Infusion Rate: 7.2 mL/hr    Plan:  Hold one hour  Rate: 5 mL/hr  Next aPTT: 1530 7/13/19    Pharmacy will continue to monitor and adjust based on aPTT results.   [facility], [facility] 7/13/2019 9:01 AM

## 2019-07-13 NOTE — PROGRESS NOTES
Pt arrived to room 5132 from ER at about 0215, A&Ox4, denies pain at this time, placed on heart monitor, CMU notified, safety precautions discussed with pt, verbalized understanding, no acute distress noted at this time, will continue to monitor

## 2019-07-13 NOTE — DISCHARGE INSTR - COC
Afluria 5 yrs and older) 09/28/2016, 09/14/2018    Pneumococcal Polysaccharide (Yszkafysj25) 12/12/2013    Tdap (Boostrix, Adacel) 11/17/2009, 10/28/2016       Active Problems:  Patient Active Problem List   Diagnosis Code    HTN (hypertension) I10    Hyperlipidemia E78.5    CAD (coronary artery disease) I25.10    Palpitation R00.2    PVC (premature ventricular contraction) I49.3    Depression F32.9    Migraine with aura, intractable G43.119    Hemisensory loss R20.0    PTSD (post-traumatic stress disorder) F43.10    Insomnia G47.00    Snoring R06.83    Atypical chest pain R07.89    Dysarthria R47.1    Port-A-Cath in place Z95.828    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    Cataract H26.9    Benign essential tremor G25.0    Tobacco use Z72.0    Acute on chronic diastolic heart failure ([facility]) I50.33    COPD (chronic obstructive pulmonary disease) ([facility]) J44.9    Pulmonary edema J81.1    NSTEMI (non-ST elevated myocardial infarction) ([facility]) I21.4    Rotator cuff tendonitis M75.80    Essential hypertension I10    Fibromyalgia M79.7    Chronic pain syndrome G89.4    Headache, chronic migraine without aura, intractable, with status G43.711    Type 2 diabetes mellitus with diabetic chronic kidney disease ([facility]) E11.22    S/P right coronary artery (RCA) stent placement Z95.5    Irritable bowel syndrome K58.9    Giant cell arteritis ([facility]) M31.6    Gastro-esophageal reflux disease without esophagitis K21.9    A-fib ([facility]) I48.91    Abscess of groin, right L02.214    Precordial chest pain R07.2    Coronary artery disease involving native coronary artery of native heart with angina pectoris ([facility]) I25.119    DM (diabetes mellitus), type 2, uncontrolled ([facility]) E11.65    Right knee pain M25.561    Chronic painful diabetic neuropathy ([facility]) E11.40    CAD in native artery I25.10    Dyspnea on exertion R06.09       Isolation/Infection:   Isolation          No Isolation            Nurse Status/Restrictions: [address] [facility]}  Other Medical Equipment (for information only, NOT a DME order):  {EQUIPMENT:[id number]}  Other Treatments: ***    Patient's personal belongings (please select all that are sent with patient):  {CHP DME Belongings:736330144}    RN SIGNATURE:  {Esignature:[id number]}    CASE MANAGEMENT/SOCIAL WORK SECTION    Inpatient Status Date: ***    Readmission Risk Assessment Score:  Readmission Risk              Risk of Unplanned Readmission:        30           Discharging to Facility/ Agency   · Name:   · Address:  · Phone:  · Fax:    Dialysis Facility (if applicable)   · Name:  · Address:  · Dialysis Schedule:  · Phone:  · Fax:    / signature: {Esignature:[id number]}    PHYSICIAN SECTION    Prognosis: {Prognosis:8123445479}    Condition at Discharge: [address]eks Patient Condition:[id number]}    Rehab Potential (if transferring to Rehab): {Prognosis:8099865575}    Recommended Labs or Other Treatments After Discharge: ***    Physician Certification: I certify the above information and transfer of Michael Castro  is necessary for the continuing treatment of the diagnosis listed and that she requires {Admit to Appropriate Level of Care:98120} for {GREATER/LESS:937987174} 30 days.      Update Admission H&P: {CHP DME Changes in FLL:094935754}    PHYSICIAN SIGNATURE:  {Esignature:[id number]}

## 2019-07-13 NOTE — H&P
[address] [facility]                              HISTORY AND PHYSICAL    PATIENT NAME: Paige Knight                     :        1955  MED REC NO:   [id number]                          ROOM:       4889  ACCOUNT NO:   [de-identified]                           ADMIT DATE: 2019  PROVIDER:     Yamileth Ramirez MD    I obtained the history and performed the physical exam on the patient on  the Medical floor on 2019. CHIEF COMPLAINT:  Chest pain. HISTORY OF PRESENT ILLNESS:  The patient is a 71-year-old  -American female who presented to the hospital with chief  complaint of 1-day history of sudden onset of retrosternal and  left-sided chest pain that began while she was on her couch sitting,  with associated pressure-like sensation, that decreased initially with  the use of sublingual nitro but did not go away completely, associated  with concomitant shortness of breath because of which she called the EMS  folks who brought her to the hospital.  At the time of my exam, the  patient notes that her pain has gone away but she just had some chronic  pain because of fibromyalgia. PAST MEDICAL/PAST SURGICAL HISTORY:  1. Coronary artery disease status post multiple stents. 2.  Hypertension. 3.  Dyslipidemia. 4.  Chronic pain with chronic narcotic dependence. 5.  COPD. 6.  Chronic diastolic congestive heart failure. PAST SURGICAL HISTORY:  PCI with stenting. ALLERGIC HISTORY:  No known allergy. FAMILY HISTORY:  Reviewed by me and is currently noncontributory. SOCIAL HISTORY:  Lives at home. 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 REVIEW OF SYSTEMS:  The patient's review of systems is significant for  the chest pain and per the history of present illness.   All other  systems have been reviewed and are negative except for the history of  present

## 2019-07-13 NOTE — CONSULTS
Social History:   reports that she quit smoking about a year ago. Her smoking use included cigarettes. She has a 17.50 pack-year smoking history. She has never used smokeless tobacco. She reports that she does not drink alcohol or use drugs. Family History:  family history includes Cancer in her mother; Diabetes in her mother; High Cholesterol in her mother; Hypertension in her mother; Stroke in her mother. Home Medications:  Were reviewed and are listed in nursing record and/or below  Prior to Admission medications    Medication Sig Start Date End Date Taking? Authorizing Provider   atorvastatin (LIPITOR) 80 MG tablet TAKE 1 TABLET BY MOUTH DA JULIEN 7/11/19  Yes Consuella Barthel, MD   loperamide (RA ANTI-DIARRHEAL) 2 MG capsule Take 1 capsule by mouth 2 times daily as needed for Diarrhea 7/9/19 7/19/19 Yes Consuella Barthel, MD   furosemide (LASIX) 20 MG tablet Take 1 tablet by mouth daily 7/2/19  Yes Consuella Barthel, MD   topiramate (TOPAMAX) 100 MG tablet TAKE 1 TABLET BY MOUTH 2 TIMES DAILY 7/1/19  Yes Consuella Barthel, MD   blood glucose test strips (TRUE METRIX BLOOD GLUCOSE TEST) strip USE TO TEST BLOOD GLUCOSE THREE TO FOUR TIMES A DAY; PLEASE DISPENSE INSURANCE PREFERRED 7/1/19  Yes Consuella Barthel, MD   oxyCODONE-acetaminophen (PERCOCET) 7.5-325 MG per tablet Take 1 tablet by mouth every 6 hours as needed for Pain for up to 30 days.  6/21/19 7/21/19 Yes Kenia Hernandez MD   QUEtiapine (SEROQUEL) 25 MG tablet Take 1-2 tablets by mouth nightly 6/21/19  Yes Kenia Hernandez MD   DULoxetine (CYMBALTA) 60 MG extended release capsule Take 1 capsule by mouth daily 6/21/19  Yes Kenia Hernandez MD   omeprazole (PRILOSEC) 20 MG delayed release capsule Take 1 capsule by mouth Daily 6/21/19  Yes Kenia Hernandez MD   buPROPion (WELLBUTRIN XL) 150 MG extended release tablet Take 1 tablet by mouth every morning 6/21/19  Yes Kenia Hernandez MD   tiZANidine (ZANAFLEX) 4 MG tablet Take 1/2 by mouth in the am, take 1 tablet by mouth in the pm 6/21/19  Yes Tahir Zepeda MD   ondansetron (ZOFRAN) 8 MG tablet TAKE 1 TABLET BY MOUTH EVERY 8 HOURS AS NEEDED FOR NAUSEA OR VOMITING 4/22/19  Yes Casa Blankenship MD   RANEXA 1000 MG extended release tablet TAKE 1 TABLET BY MOUTH 2 TIMES DAILY 4/11/19  Yes Casa Blankenship MD   ASPIRIN LOW DOSE 81 MG EC tablet TAKE 1 TABLET BY MOUTH DA JULIEN 4/11/19  Yes Casa Blankenship MD   metoprolol succinate (TOPROL XL) 50 MG extended release tablet TAKE 1 TABLET BY MOUTH DAILY 3/15/19  Yes Veronica Kraus MD   hydrALAZINE (APRESOLINE) 50 MG tablet Take 1 tablet by mouth 2 times daily 3/6/19  Yes Casa Blankenship MD   isosorbide mononitrate (IMDUR) 30 MG extended release tablet Take 1 tablet by mouth daily 3/6/19  Yes Casa Blankenship MD   amLODIPine (NORVASC) 10 MG tablet Take 0.5 tablets by mouth daily 3/6/19  Yes Casa Blankenship MD   rosuvastatin (CRESTOR) 40 MG tablet Take 1 tablet by mouth nightly 2/5/19  Yes Casa Blankenship MD   nitroGLYCERIN (NITROSTAT) 0.4 MG SL tablet PLACE 1 TABLET UNDER THE TONGUE EVERY 5 MINUTES AS NEEDED FOR CHEST PAIN.  1/10/19  Yes Casa Blankenship MD   budesonide-formoterol (SYMBICORT) 160-4.5 MCG/ACT AERO Inhale 2 puffs into the lungs daily  Patient taking differently: Inhale 2 puffs into the lungs daily as needed  11/7/18  Yes Casa Blankenship MD   albuterol sulfate HFA (PROAIR HFA) 108 (90 Base) MCG/ACT inhaler Inhale 2 puffs into the lungs every 6 hours as needed for Wheezing 11/7/18  Yes Casa Blankenship MD   clopidogrel (PLAVIX) 75 MG tablet Take 1 tablet by mouth daily 8/13/18  Yes Casa Blankenship MD   ranitidine (ZANTAC) 150 MG tablet Take 1 tablet by mouth 2 times daily as needed for Heartburn  Patient taking differently: Take 150 mg by mouth 2 times daily  8/13/18  Yes Casa Blankenship MD   Lancets MISC Test three times daily  250.00 E 11.9, PLEASE DISPENSE INSURANCE PREFERRED 7/1/19   Casa Blankenship MD   glucose monitoring kit (FREESTInvestview)

## 2019-07-13 NOTE — ED PROVIDER NOTES
PORT PLACEMENT      left thigh.  VASCULAR SURGERY       Family History   Problem Relation Age of Onset    Diabetes Mother     Hypertension Mother     High Cholesterol Mother     Stroke Mother     Cancer Mother      Social History     Socioeconomic History    Marital status:      Spouse name: Not on file    Number of children: 6    Years of education: Not on file    Highest education level: Not on file   Occupational History    Not on file   Social Needs    Financial resource strain: Not on file    Food insecurity:     Worry: Not on file     Inability: Not on file    Transportation needs:     Medical: Not on file     Non-medical: Not on file   Tobacco Use    Smoking status: Former Smoker     Packs/day: 0.50     Years: 35.00     Pack years: 17.50     Types: Cigarettes     Last attempt to quit: 2018     Years since quittin.0    Smokeless tobacco: Never Used    Tobacco comment: 5/13/15 has not smoked since hospitalization -    Substance and Sexual Activity    Alcohol use: No     Alcohol/week: 0.0 standard drinks    Drug use: No    Sexual activity: Yes     Partners: Male   Lifestyle    Physical activity:     Days per week: Not on file     Minutes per session: Not on file    Stress: Not on file   Relationships    Social connections:     Talks on phone: Not on file     Gets together: Not on file     Attends Shinto service: Not on file     Active member of club or organization: Not on file     Attends meetings of clubs or organizations: Not on file     Relationship status: Not on file    Intimate partner violence:     Fear of current or ex partner: Not on file     Emotionally abused: Not on file     Physically abused: Not on file     Forced sexual activity: Not on file   Other Topics Concern    Not on file   Social History Narrative    Not on file     No current facility-administered medications for this encounter.       Current Outpatient Medications   Medication Sig Dispense 90 tablet 5    nitroGLYCERIN (NITROSTAT) 0.4 MG SL tablet PLACE 1 TABLET UNDER THE TONGUE EVERY 5 MINUTES AS NEEDED FOR CHEST PAIN. 25 tablet 2    budesonide-formoterol (SYMBICORT) 160-4.5 MCG/ACT AERO Inhale 2 puffs into the lungs daily (Patient taking differently: Inhale 2 puffs into the lungs daily as needed ) 11 g 4    albuterol sulfate HFA (PROAIR HFA) 108 (90 Base) MCG/ACT inhaler Inhale 2 puffs into the lungs every 6 hours as needed for Wheezing 1 Inhaler 5    clopidogrel (PLAVIX) 75 MG tablet Take 1 tablet by mouth daily 90 tablet 5    ranitidine (ZANTAC) 150 MG tablet Take 1 tablet by mouth 2 times daily as needed for Heartburn (Patient taking differently: Take 150 mg by mouth 2 times daily ) 60 tablet 3    Lancets MISC Test three times daily  250.00 E 11.9, PLEASE DISPENSE INSURANCE PREFERRED 100 each 5    glucose monitoring kit (FREESTYLE) monitoring kit 1 each by Does not apply route daily May dispense glucometer of patients preference along with compatible strips. PLEASE DISPENSE INSURANCE PREFERRED SUPPLIES 1 kit 0    furosemide (LASIX) 80 MG tablet Take 1 tablet by mouth daily 30 tablet 0    sulfamethoxazole-trimethoprim (BACTRIM;SEPTRA) 400-80 MG per tablet TAKE ONE TABLET BY MOUTH TWICE A DAY FOR 7 DAYS 14 tablet 0    Continuous Blood Gluc  (FREESTYLE LISSETT 14 DAY READER) DAYO 1 kit by Does not apply route every 14 days 1 Device 0    Continuous Blood Gluc Sensor (FREESTYLE LISSETT 14 DAY SENSOR) MISC 1 each by Does not apply route every 14 days 4 each 5    insulin glargine (BASAGLAR KWIKPEN) 100 UNIT/ML injection pen Inject 60 Units into the skin 2 times daily 15 mL 5    insulin aspart (NOVOLOG FLEXPEN) 100 UNIT/ML injection pen Inject 5-10 Units into the skin 3 times daily (before meals) 5 pen 4    torsemide (DEMADEX) 20 MG tablet Take 1 tablet by mouth daily 30 tablet 5    butalbital-aspirin-caffeine (FIORINAL) -40 MG per capsule Take 1 capsule by mouth every 6 hours as %    .8 (H) 80.0 - 100.0 fL    MCH 33.5 26.0 - 34.0 pg    MCHC 32.6 31.0 - 36.0 g/dL    RDW 14.9 12.4 - 15.4 %    Platelets 011 652 - 890 K/uL    MPV 8.8 5.0 - 10.5 fL   APTT   Result Value Ref Range    aPTT 120.6 (HH) 26.0 - 36.0 sec   Hemoglobin A1c   Result Value Ref Range    Hemoglobin A1C 9.0 See comment %    eAG 211.6 mg/dL   POCT Glucose   Result Value Ref Range    POC Glucose 253 (H) 70 - 99 mg/dl    Performed on ACCU-CHEK    POCT Glucose   Result Value Ref Range    POC Glucose 280 (H) 70 - 99 mg/dl    Performed on ACCU-CHEK    POCT Glucose   Result Value Ref Range    POC Glucose 149 (H) 70 - 99 mg/dl    Performed on ACCU-CHEK    POCT Glucose   Result Value Ref Range    POC Glucose 120 (H) 70 - 99 mg/dl    Performed on ACCU-CHEK    EKG 12 Lead   Result Value Ref Range    Ventricular Rate 73 BPM    Atrial Rate 73 BPM    P-R Interval 118 ms    QRS Duration 90 ms    Q-T Interval 384 ms    QTc Calculation (Bazett) 423 ms    P Axis 6 degrees    R Axis 38 degrees    T Axis 192 degrees    Diagnosis       Normal sinus rhythmCannot rule out Septal infarct (cited on or before 15-MAY-2019)Nonspecific ST and T wave abnormalityAbnormal ECGWhen compared with ECG of 15-MAY-2019 19:44,QT has shortenedConfirmed by FRANKIE CAIN, Hector Burrell ([id number]) on 7/13/2019 5:20:33 PM        Radiographs (if obtained):  [] The following radiograph was interpreted by myself in the absence of a radiologist:  [x] Radiologist's Report Reviewed:  Xr Chest Standard (2 Vw)    Result Date: 7/12/2019  EXAMINATION: TWO XRAY VIEWS OF THE CHEST 7/12/2019 9:19 pm COMPARISON: None. HISTORY: ORDERING SYSTEM PROVIDED HISTORY: chest pain TECHNOLOGIST PROVIDED HISTORY: Reason for exam:->chest pain Reason for Exam: chest pain Acuity: Acute Type of Exam: Initial FINDINGS: No infiltrate or consolidation or effusion is identified. The heart size is within normal limits. Right upper quadrant surgical clips are present in keeping with previous cholecystectomy.

## 2019-07-13 NOTE — PROGRESS NOTES
4 Eyes Skin Assessment     The patient is being assess for  Admission    I agree that 2 RN's have performed a thorough Head to Toe Skin Assessment on the patient. ALL assessment sites listed below have been assessed. Areas assessed by both nurses:   [x]   Head, Face, and Ears   [x]   Shoulders, Back, and Chest  [x]   Arms, Elbows, and Hands   [x]   Coccyx, Sacrum, and IschIum  [x]   Legs, Feet, and Heels        Does the Patient have Skin Breakdown?   No         Rakan Prevention initiated:  NA   Wound Care Orders initiated:  NA      [facility] nurse consulted for Pressure Injury (Stage 3,4, Unstageable, DTI, NWPT, and Complex wounds), New and Established Ostomies:  NA      Nurse 1 eSignature: Electronically signed by Rylie Bryan RN on 7/13/19 at 2:51 AM    **SHARE this note so that the co-signing nurse is able to place an eSignature**    Nurse 2 eSignature: Electronically signed by Xiao Chong RN on 7/13/19 at 3:40 AM

## 2019-07-13 NOTE — ED NOTES
Bed: P51-E  Expected date:   Expected time:   Means of arrival:   Comments:  GATO Knight RN  07/12/19 2037

## 2019-07-14 VITALS
OXYGEN SATURATION: 100 % | HEART RATE: 58 BPM | DIASTOLIC BLOOD PRESSURE: 62 MMHG | HEIGHT: 60 IN | SYSTOLIC BLOOD PRESSURE: 133 MMHG | BODY MASS INDEX: 26.01 KG/M2 | WEIGHT: 132.5 LBS | RESPIRATION RATE: 18 BRPM | TEMPERATURE: 97.9 F

## 2019-07-14 LAB
ESTIMATED AVERAGE GLUCOSE: 211.6 MG/DL
GLUCOSE BLD-MCNC: 120 MG/DL (ref 70–99)
GLUCOSE BLD-MCNC: 149 MG/DL (ref 70–99)
HBA1C MFR BLD: 9 %
PERFORMED ON: ABNORMAL
PERFORMED ON: ABNORMAL

## 2019-07-14 PROCEDURE — G0378 HOSPITAL OBSERVATION PER HR: HCPCS

## 2019-07-14 PROCEDURE — 94640 AIRWAY INHALATION TREATMENT: CPT

## 2019-07-14 PROCEDURE — 6370000000 HC RX 637 (ALT 250 FOR IP): Performed by: HOSPITALIST

## 2019-07-14 PROCEDURE — 2580000003 HC RX 258: Performed by: INTERNAL MEDICINE

## 2019-07-14 PROCEDURE — 6370000000 HC RX 637 (ALT 250 FOR IP): Performed by: INTERNAL MEDICINE

## 2019-07-14 PROCEDURE — 94760 N-INVAS EAR/PLS OXIMETRY 1: CPT

## 2019-07-14 RX ADMIN — OXYCODONE HYDROCHLORIDE AND ACETAMINOPHEN 1 TABLET: 7.5; 325 TABLET ORAL at 12:14

## 2019-07-14 RX ADMIN — SODIUM CHLORIDE, PRESERVATIVE FREE 10 ML: 5 INJECTION INTRAVENOUS at 08:16

## 2019-07-14 RX ADMIN — INSULIN LISPRO 2 UNITS: 100 INJECTION, SOLUTION INTRAVENOUS; SUBCUTANEOUS at 08:20

## 2019-07-14 RX ADMIN — ISOSORBIDE MONONITRATE 30 MG: 30 TABLET, EXTENDED RELEASE ORAL at 08:15

## 2019-07-14 RX ADMIN — MOMETASONE FUROATE AND FORMOTEROL FUMARATE DIHYDRATE 2 PUFF: 200; 5 AEROSOL RESPIRATORY (INHALATION) at 09:00

## 2019-07-14 RX ADMIN — INSULIN GLARGINE 60 UNITS: 100 INJECTION, SOLUTION SUBCUTANEOUS at 08:12

## 2019-07-14 RX ADMIN — RANOLAZINE 1000 MG: 500 TABLET, FILM COATED, EXTENDED RELEASE ORAL at 08:15

## 2019-07-14 RX ADMIN — PANTOPRAZOLE SODIUM 40 MG: 40 TABLET, DELAYED RELEASE ORAL at 05:58

## 2019-07-14 RX ADMIN — CLOPIDOGREL BISULFATE 75 MG: 75 TABLET ORAL at 08:15

## 2019-07-14 RX ADMIN — OXYCODONE HYDROCHLORIDE AND ACETAMINOPHEN 1 TABLET: 7.5; 325 TABLET ORAL at 05:58

## 2019-07-14 RX ADMIN — BUPROPION HYDROCHLORIDE 150 MG: 150 TABLET, FILM COATED, EXTENDED RELEASE ORAL at 08:14

## 2019-07-14 RX ADMIN — HYDRALAZINE HYDROCHLORIDE 50 MG: 50 TABLET, FILM COATED ORAL at 10:06

## 2019-07-14 RX ADMIN — DULOXETINE HYDROCHLORIDE 60 MG: 60 CAPSULE, DELAYED RELEASE ORAL at 08:15

## 2019-07-14 RX ADMIN — TOPIRAMATE 100 MG: 100 TABLET, FILM COATED ORAL at 10:06

## 2019-07-14 RX ADMIN — ASPIRIN 81 MG: 81 TABLET, COATED ORAL at 08:14

## 2019-07-14 RX ADMIN — CHLORTHALIDONE 25 MG: 25 TABLET ORAL at 10:07

## 2019-07-14 RX ADMIN — AMLODIPINE BESYLATE 5 MG: 5 TABLET ORAL at 08:14

## 2019-07-14 RX ADMIN — METOPROLOL SUCCINATE 50 MG: 50 TABLET, EXTENDED RELEASE ORAL at 10:07

## 2019-07-14 ASSESSMENT — PAIN SCALES - GENERAL
PAINLEVEL_OUTOF10: 0
PAINLEVEL_OUTOF10: 8
PAINLEVEL_OUTOF10: 8
PAINLEVEL_OUTOF10: 6
PAINLEVEL_OUTOF10: 8

## 2019-07-14 ASSESSMENT — PAIN DESCRIPTION - DESCRIPTORS
DESCRIPTORS: ACHING

## 2019-07-14 ASSESSMENT — PAIN - FUNCTIONAL ASSESSMENT
PAIN_FUNCTIONAL_ASSESSMENT: ACTIVITIES ARE NOT PREVENTED

## 2019-07-14 ASSESSMENT — PAIN DESCRIPTION - PROGRESSION
CLINICAL_PROGRESSION: NOT CHANGED
CLINICAL_PROGRESSION: NOT CHANGED
CLINICAL_PROGRESSION: GRADUALLY IMPROVING

## 2019-07-14 ASSESSMENT — PAIN DESCRIPTION - ORIENTATION
ORIENTATION: OTHER (COMMENT)
ORIENTATION: RIGHT
ORIENTATION: RIGHT

## 2019-07-14 ASSESSMENT — PAIN DESCRIPTION - FREQUENCY
FREQUENCY: CONTINUOUS

## 2019-07-14 ASSESSMENT — PAIN DESCRIPTION - ONSET
ONSET: ON-GOING

## 2019-07-14 ASSESSMENT — PAIN DESCRIPTION - LOCATION
LOCATION: GENERALIZED
LOCATION: GENERALIZED;ARM
LOCATION: GENERALIZED;ARM

## 2019-07-14 ASSESSMENT — PAIN DESCRIPTION - PAIN TYPE
TYPE: CHRONIC PAIN

## 2019-07-14 NOTE — PLAN OF CARE
Problem: Falls - Risk of:  Goal: Will remain free from falls  Description  Will remain free from falls  7/14/2019 0536 by Ambreen Vick RN  Outcome: Ongoing     Problem: Pain:  Goal: Pain level will decrease  Description  Pain level will decrease  7/14/2019 0536 by Ambreen Vick RN  Outcome: Ongoing

## 2019-07-16 ASSESSMENT — HEART SCORE: ECG: 1

## 2019-07-17 NOTE — DISCHARGE SUMMARY
native heart with angina pectoris ([facility]    DM (diabetes mellitus), type 2, uncontrolled ([facility]    Right knee pain    [facility] painful diabetic neuropathy [facility]    CAD in native artery    Dyspnea on exertion    Unstable angina (HCC)         Presenting symptoms:  Chest pain. Diagnostic workup:      CONSULTS DURING ADMISSION :   IP CONSULT TO CARDIOLOGY      Patient was diagnosed with:  Atypical chest pain    Multivessel coronary artery disease.    Hypertension.     Stage III chronic kidney disease.   Dyslipidemia. Chronic pain with chronic narcotic dependence.     Type 2 diabetes mellitus. Treatment while inpatient:  Patient presented with chest pain. Patient was ruled out for acute coronary syndrome. Cardiology was also consulted and they recommended to continue medical management of coronary artery disease. Patient is to continued disease modifying agents including aspirin and Plavix statin and beta-blocker Imdur calcium channel blocker and Ranexa                         Discharge Condition:  stable     Discharged to:  Home     Activity:   as tolerated: Follow Up: Follow-up with PCP in 1-2 weeks         Labs:  For convenience and continuity at follow-up the following most recent labs are provided:      CBC:   Lab Results   Component Value Date    WBC 5.2 07/13/2019    HGB 11.0 07/13/2019    HCT 33.8 07/13/2019     07/13/2019       RENAL:   Lab Results   Component Value Date     07/12/2019    K 4.5 07/12/2019    K 4.1 05/15/2019     07/12/2019    CO2 18 07/12/2019    BUN 21 07/12/2019    CREATININE 1.4 07/12/2019           Discharge Medications:    Benito Rocha, [address] Medication Instructions [facility]:[id number]    Printed on:07/16/19 0716   Medication Information                      albuterol sulfate HFA (PROAIR HFA) 108 (90 Base) MCG/ACT inhaler  Inhale 2 puffs into the lungs every 6 hours as needed for Wheezing             amLODIPine (NORVASC) 10 MG tablet  Take 0.5

## 2019-07-18 ENCOUNTER — TELEPHONE (OUTPATIENT)
Dept: PRIMARY CARE CLINIC | Age: 64
End: 2019-07-18

## 2019-07-23 ENCOUNTER — TELEPHONE (OUTPATIENT)
Dept: INTERNAL MEDICINE CLINIC | Age: 64
End: 2019-07-23

## 2019-07-28 ENCOUNTER — APPOINTMENT (OUTPATIENT)
Dept: GENERAL RADIOLOGY | Age: 64
DRG: 202 | End: 2019-07-28
Payer: COMMERCIAL

## 2019-07-28 ENCOUNTER — HOSPITAL ENCOUNTER (INPATIENT)
Age: 64
LOS: 4 days | Discharge: HOME HEALTH | DRG: 202 | End: 2019-08-01
Attending: EMERGENCY MEDICINE | Admitting: INTERNAL MEDICINE
Payer: COMMERCIAL

## 2019-07-28 DIAGNOSIS — J44.1 COPD EXACERBATION (HCC): ICD-10-CM

## 2019-07-28 DIAGNOSIS — A41.9 SEPSIS, DUE TO UNSPECIFIED ORGANISM: ICD-10-CM

## 2019-07-28 DIAGNOSIS — J18.9 PNEUMONIA DUE TO ORGANISM: ICD-10-CM

## 2019-07-28 DIAGNOSIS — N17.9 AKI (ACUTE KIDNEY INJURY) (HCC): Primary | ICD-10-CM

## 2019-07-28 LAB
A/G RATIO: 1.2 (ref 1.1–2.2)
ALBUMIN SERPL-MCNC: 4.3 G/DL (ref 3.4–5)
ALP BLD-CCNC: 143 U/L (ref 40–129)
ALT SERPL-CCNC: 35 U/L (ref 10–40)
ANION GAP SERPL CALCULATED.3IONS-SCNC: 18 MMOL/L (ref 3–16)
ANION GAP SERPL CALCULATED.3IONS-SCNC: 20 MMOL/L (ref 3–16)
AST SERPL-CCNC: 38 U/L (ref 15–37)
BASE EXCESS ARTERIAL: -7.4 MMOL/L (ref -3–3)
BASE EXCESS VENOUS: -10.8 MMOL/L
BASOPHILS ABSOLUTE: 0 K/UL (ref 0–0.2)
BASOPHILS RELATIVE PERCENT: 0.6 %
BILIRUB SERPL-MCNC: 0.3 MG/DL (ref 0–1)
BUN BLDV-MCNC: 25 MG/DL (ref 7–20)
BUN BLDV-MCNC: 31 MG/DL (ref 7–20)
CALCIUM SERPL-MCNC: 8.7 MG/DL (ref 8.3–10.6)
CALCIUM SERPL-MCNC: 9.2 MG/DL (ref 8.3–10.6)
CARBOXYHEMOGLOBIN ARTERIAL: 1.2 % (ref 0–1.5)
CARBOXYHEMOGLOBIN: 2.1 %
CHLORIDE BLD-SCNC: 100 MMOL/L (ref 99–110)
CHLORIDE BLD-SCNC: 96 MMOL/L (ref 99–110)
CO2: 15 MMOL/L (ref 21–32)
CO2: 16 MMOL/L (ref 21–32)
CREAT SERPL-MCNC: 1.6 MG/DL (ref 0.6–1.2)
CREAT SERPL-MCNC: 2.1 MG/DL (ref 0.6–1.2)
D DIMER: <200 NG/ML DDU (ref 0–229)
EKG ATRIAL RATE: 108 BPM
EKG DIAGNOSIS: NORMAL
EKG P AXIS: 60 DEGREES
EKG P-R INTERVAL: 98 MS
EKG Q-T INTERVAL: 324 MS
EKG QRS DURATION: 70 MS
EKG QTC CALCULATION (BAZETT): 434 MS
EKG R AXIS: 41 DEGREES
EKG T AXIS: 109 DEGREES
EKG VENTRICULAR RATE: 108 BPM
EOSINOPHILS ABSOLUTE: 0.1 K/UL (ref 0–0.6)
EOSINOPHILS RELATIVE PERCENT: 2.1 %
FERRITIN: 60.5 NG/ML (ref 15–150)
FOLATE: >20 NG/ML (ref 4.78–24.2)
GFR AFRICAN AMERICAN: 29
GFR AFRICAN AMERICAN: 39
GFR NON-AFRICAN AMERICAN: 24
GFR NON-AFRICAN AMERICAN: 33
GLOBULIN: 3.7 G/DL
GLUCOSE BLD-MCNC: 131 MG/DL (ref 70–99)
GLUCOSE BLD-MCNC: 265 MG/DL (ref 70–99)
GLUCOSE BLD-MCNC: 470 MG/DL (ref 70–99)
GLUCOSE BLD-MCNC: 480 MG/DL (ref 70–99)
GLUCOSE BLD-MCNC: 512 MG/DL (ref 70–99)
HCO3 ARTERIAL: 16.6 MMOL/L (ref 21–29)
HCO3 VENOUS: 15 MMOL/L (ref 23–29)
HCT VFR BLD CALC: 30.9 % (ref 36–48)
HEMOGLOBIN, ART, EXTENDED: 8.3 G/DL (ref 12–16)
HEMOGLOBIN: 10.3 G/DL (ref 12–16)
IRON SATURATION: 13 % (ref 15–50)
IRON: 43 UG/DL (ref 37–145)
LACTIC ACID, SEPSIS: 3.5 MMOL/L (ref 0.4–1.9)
LACTIC ACID, SEPSIS: 3.9 MMOL/L (ref 0.4–1.9)
LACTIC ACID: 2.7 MMOL/L (ref 0.4–2)
LACTIC ACID: 3.3 MMOL/L (ref 0.4–2)
LACTIC ACID: 5.1 MMOL/L (ref 0.4–2)
LYMPHOCYTES ABSOLUTE: 1.5 K/UL (ref 1–5.1)
LYMPHOCYTES RELATIVE PERCENT: 22.7 %
MAGNESIUM: 1.9 MG/DL (ref 1.8–2.4)
MCH RBC QN AUTO: 34 PG (ref 26–34)
MCHC RBC AUTO-ENTMCNC: 33.2 G/DL (ref 31–36)
MCV RBC AUTO: 102.3 FL (ref 80–100)
METHEMOGLOBIN ARTERIAL: 0.7 %
METHEMOGLOBIN VENOUS: 0.8 %
MONOCYTES ABSOLUTE: 0.4 K/UL (ref 0–1.3)
MONOCYTES RELATIVE PERCENT: 6.2 %
NEUTROPHILS ABSOLUTE: 4.5 K/UL (ref 1.7–7.7)
NEUTROPHILS RELATIVE PERCENT: 68.4 %
O2 CONTENT ARTERIAL: 12 ML/DL
O2 CONTENT, VEN: 15 ML/DL
O2 SAT, ARTERIAL: 98.3 %
O2 SAT, VEN: 99 %
O2 THERAPY: ABNORMAL
O2 THERAPY: ABNORMAL
PCO2 ARTERIAL: 29.6 MMHG (ref 35–45)
PCO2, VEN: 34.8 MMHG (ref 40–50)
PDW BLD-RTO: 15 % (ref 12.4–15.4)
PERFORMED ON: ABNORMAL
PH ARTERIAL: 7.37 (ref 7.35–7.45)
PH VENOUS: 7.26 (ref 7.35–7.45)
PLATELET # BLD: 215 K/UL (ref 135–450)
PMV BLD AUTO: 8.1 FL (ref 5–10.5)
PO2 ARTERIAL: 97.4 MMHG (ref 75–108)
PO2, VEN: 196 MMHG
POTASSIUM REFLEX MAGNESIUM: 3.5 MMOL/L (ref 3.5–5.1)
POTASSIUM SERPL-SCNC: 4.4 MMOL/L (ref 3.5–5.1)
PRO-BNP: 495 PG/ML (ref 0–124)
RBC # BLD: 3.02 M/UL (ref 4–5.2)
REPORT: NORMAL
RESPIRATORY PANEL PCR: NORMAL
SODIUM BLD-SCNC: 130 MMOL/L (ref 136–145)
SODIUM BLD-SCNC: 135 MMOL/L (ref 136–145)
TCO2 ARTERIAL: 17.5 MMOL/L
TCO2 CALC VENOUS: 16 MMOL/L
TOTAL IRON BINDING CAPACITY: 332 UG/DL (ref 260–445)
TOTAL PROTEIN: 8 G/DL (ref 6.4–8.2)
TROPONIN: <0.01 NG/ML
VITAMIN B-12: 1474 PG/ML (ref 211–911)
WBC # BLD: 6.6 K/UL (ref 4–11)

## 2019-07-28 PROCEDURE — 6370000000 HC RX 637 (ALT 250 FOR IP): Performed by: INTERNAL MEDICINE

## 2019-07-28 PROCEDURE — 6360000002 HC RX W HCPCS: Performed by: EMERGENCY MEDICINE

## 2019-07-28 PROCEDURE — 83605 ASSAY OF LACTIC ACID: CPT

## 2019-07-28 PROCEDURE — 71045 X-RAY EXAM CHEST 1 VIEW: CPT

## 2019-07-28 PROCEDURE — 87633 RESP VIRUS 12-25 TARGETS: CPT

## 2019-07-28 PROCEDURE — 83550 IRON BINDING TEST: CPT

## 2019-07-28 PROCEDURE — 82803 BLOOD GASES ANY COMBINATION: CPT

## 2019-07-28 PROCEDURE — 36600 WITHDRAWAL OF ARTERIAL BLOOD: CPT

## 2019-07-28 PROCEDURE — 94640 AIRWAY INHALATION TREATMENT: CPT

## 2019-07-28 PROCEDURE — 99223 1ST HOSP IP/OBS HIGH 75: CPT | Performed by: INTERNAL MEDICINE

## 2019-07-28 PROCEDURE — 83540 ASSAY OF IRON: CPT

## 2019-07-28 PROCEDURE — 6370000000 HC RX 637 (ALT 250 FOR IP): Performed by: EMERGENCY MEDICINE

## 2019-07-28 PROCEDURE — 87040 BLOOD CULTURE FOR BACTERIA: CPT

## 2019-07-28 PROCEDURE — 85379 FIBRIN DEGRADATION QUANT: CPT

## 2019-07-28 PROCEDURE — 2700000000 HC OXYGEN THERAPY PER DAY

## 2019-07-28 PROCEDURE — 73552 X-RAY EXAM OF FEMUR 2/>: CPT

## 2019-07-28 PROCEDURE — 93010 ELECTROCARDIOGRAM REPORT: CPT | Performed by: INTERNAL MEDICINE

## 2019-07-28 PROCEDURE — 36415 COLL VENOUS BLD VENIPUNCTURE: CPT

## 2019-07-28 PROCEDURE — 82607 VITAMIN B-12: CPT

## 2019-07-28 PROCEDURE — 96376 TX/PRO/DX INJ SAME DRUG ADON: CPT

## 2019-07-28 PROCEDURE — 84484 ASSAY OF TROPONIN QUANT: CPT

## 2019-07-28 PROCEDURE — 2580000003 HC RX 258: Performed by: INTERNAL MEDICINE

## 2019-07-28 PROCEDURE — 2580000003 HC RX 258: Performed by: EMERGENCY MEDICINE

## 2019-07-28 PROCEDURE — 96375 TX/PRO/DX INJ NEW DRUG ADDON: CPT

## 2019-07-28 PROCEDURE — 6370000000 HC RX 637 (ALT 250 FOR IP): Performed by: FAMILY MEDICINE

## 2019-07-28 PROCEDURE — 2580000003 HC RX 258: Performed by: FAMILY MEDICINE

## 2019-07-28 PROCEDURE — 94761 N-INVAS EAR/PLS OXIMETRY MLT: CPT

## 2019-07-28 PROCEDURE — 6360000002 HC RX W HCPCS: Performed by: INTERNAL MEDICINE

## 2019-07-28 PROCEDURE — 96365 THER/PROPH/DIAG IV INF INIT: CPT

## 2019-07-28 PROCEDURE — 99285 EMERGENCY DEPT VISIT HI MDM: CPT

## 2019-07-28 PROCEDURE — 87581 M.PNEUMON DNA AMP PROBE: CPT

## 2019-07-28 PROCEDURE — 83880 ASSAY OF NATRIURETIC PEPTIDE: CPT

## 2019-07-28 PROCEDURE — 87486 CHLMYD PNEUM DNA AMP PROBE: CPT

## 2019-07-28 PROCEDURE — 82746 ASSAY OF FOLIC ACID SERUM: CPT

## 2019-07-28 PROCEDURE — 82728 ASSAY OF FERRITIN: CPT

## 2019-07-28 PROCEDURE — 85025 COMPLETE CBC W/AUTO DIFF WBC: CPT

## 2019-07-28 PROCEDURE — 1200000000 HC SEMI PRIVATE

## 2019-07-28 PROCEDURE — 83735 ASSAY OF MAGNESIUM: CPT

## 2019-07-28 PROCEDURE — 93005 ELECTROCARDIOGRAM TRACING: CPT | Performed by: EMERGENCY MEDICINE

## 2019-07-28 PROCEDURE — 80053 COMPREHEN METABOLIC PANEL: CPT

## 2019-07-28 PROCEDURE — 87798 DETECT AGENT NOS DNA AMP: CPT

## 2019-07-28 RX ORDER — HYDRALAZINE HYDROCHLORIDE 25 MG/1
50 TABLET, FILM COATED ORAL 2 TIMES DAILY
Status: DISCONTINUED | OUTPATIENT
Start: 2019-07-28 | End: 2019-08-01 | Stop reason: HOSPADM

## 2019-07-28 RX ORDER — IPRATROPIUM BROMIDE AND ALBUTEROL SULFATE 2.5; .5 MG/3ML; MG/3ML
1 SOLUTION RESPIRATORY (INHALATION) ONCE
Status: COMPLETED | OUTPATIENT
Start: 2019-07-28 | End: 2019-07-28

## 2019-07-28 RX ORDER — ALBUTEROL SULFATE 2.5 MG/3ML
2.5 SOLUTION RESPIRATORY (INHALATION) ONCE
Status: COMPLETED | OUTPATIENT
Start: 2019-07-28 | End: 2019-07-28

## 2019-07-28 RX ORDER — TOPIRAMATE 100 MG/1
100 TABLET, FILM COATED ORAL 2 TIMES DAILY
Status: DISCONTINUED | OUTPATIENT
Start: 2019-07-28 | End: 2019-08-01 | Stop reason: HOSPADM

## 2019-07-28 RX ORDER — DEXTROSE MONOHYDRATE 25 G/50ML
12.5 INJECTION, SOLUTION INTRAVENOUS PRN
Status: DISCONTINUED | OUTPATIENT
Start: 2019-07-28 | End: 2019-08-01 | Stop reason: HOSPADM

## 2019-07-28 RX ORDER — NITROGLYCERIN 0.4 MG/1
0.4 TABLET SUBLINGUAL EVERY 5 MIN PRN
Status: DISCONTINUED | OUTPATIENT
Start: 2019-07-28 | End: 2019-08-01 | Stop reason: HOSPADM

## 2019-07-28 RX ORDER — BUPROPION HYDROCHLORIDE 150 MG/1
150 TABLET ORAL EVERY MORNING
Status: DISCONTINUED | OUTPATIENT
Start: 2019-07-28 | End: 2019-08-01 | Stop reason: HOSPADM

## 2019-07-28 RX ORDER — MORPHINE SULFATE 4 MG/ML
4 INJECTION, SOLUTION INTRAMUSCULAR; INTRAVENOUS
Status: COMPLETED | OUTPATIENT
Start: 2019-07-28 | End: 2019-07-28

## 2019-07-28 RX ORDER — OXYCODONE AND ACETAMINOPHEN 7.5; 325 MG/1; MG/1
1 TABLET ORAL EVERY 8 HOURS PRN
Status: DISCONTINUED | OUTPATIENT
Start: 2019-07-28 | End: 2019-08-01 | Stop reason: HOSPADM

## 2019-07-28 RX ORDER — SODIUM CHLORIDE 9 MG/ML
INJECTION, SOLUTION INTRAVENOUS CONTINUOUS
Status: DISCONTINUED | OUTPATIENT
Start: 2019-07-28 | End: 2019-07-28

## 2019-07-28 RX ORDER — ISOSORBIDE MONONITRATE 30 MG/1
30 TABLET, EXTENDED RELEASE ORAL DAILY
Status: DISCONTINUED | OUTPATIENT
Start: 2019-07-28 | End: 2019-08-01 | Stop reason: HOSPADM

## 2019-07-28 RX ORDER — LEVOFLOXACIN 500 MG/1
500 TABLET, FILM COATED ORAL EVERY OTHER DAY
Status: DISCONTINUED | OUTPATIENT
Start: 2019-07-28 | End: 2019-08-01 | Stop reason: HOSPADM

## 2019-07-28 RX ORDER — SODIUM CHLORIDE 0.9 % (FLUSH) 0.9 %
10 SYRINGE (ML) INJECTION PRN
Status: DISCONTINUED | OUTPATIENT
Start: 2019-07-28 | End: 2019-08-01 | Stop reason: HOSPADM

## 2019-07-28 RX ORDER — SODIUM CHLORIDE, SODIUM LACTATE, POTASSIUM CHLORIDE, CALCIUM CHLORIDE 600; 310; 30; 20 MG/100ML; MG/100ML; MG/100ML; MG/100ML
INJECTION, SOLUTION INTRAVENOUS CONTINUOUS
Status: DISCONTINUED | OUTPATIENT
Start: 2019-07-28 | End: 2019-07-31

## 2019-07-28 RX ORDER — INSULIN GLARGINE 100 [IU]/ML
60 INJECTION, SOLUTION SUBCUTANEOUS 2 TIMES DAILY
Status: DISCONTINUED | OUTPATIENT
Start: 2019-07-28 | End: 2019-08-01 | Stop reason: HOSPADM

## 2019-07-28 RX ORDER — DULOXETIN HYDROCHLORIDE 30 MG/1
60 CAPSULE, DELAYED RELEASE ORAL DAILY
Status: DISCONTINUED | OUTPATIENT
Start: 2019-07-28 | End: 2019-08-01 | Stop reason: HOSPADM

## 2019-07-28 RX ORDER — METHYLPREDNISOLONE SODIUM SUCCINATE 40 MG/ML
40 INJECTION, POWDER, LYOPHILIZED, FOR SOLUTION INTRAMUSCULAR; INTRAVENOUS EVERY 8 HOURS
Status: DISCONTINUED | OUTPATIENT
Start: 2019-07-28 | End: 2019-07-29

## 2019-07-28 RX ORDER — RANOLAZINE 500 MG/1
1000 TABLET, EXTENDED RELEASE ORAL 2 TIMES DAILY
Status: DISCONTINUED | OUTPATIENT
Start: 2019-07-28 | End: 2019-08-01 | Stop reason: HOSPADM

## 2019-07-28 RX ORDER — NICOTINE POLACRILEX 4 MG
15 LOZENGE BUCCAL PRN
Status: DISCONTINUED | OUTPATIENT
Start: 2019-07-28 | End: 2019-08-01 | Stop reason: HOSPADM

## 2019-07-28 RX ORDER — INSULIN GLARGINE 100 [IU]/ML
60 INJECTION, SOLUTION SUBCUTANEOUS 2 TIMES DAILY
Status: DISCONTINUED | OUTPATIENT
Start: 2019-07-28 | End: 2019-07-28

## 2019-07-28 RX ORDER — ASPIRIN 81 MG/1
81 TABLET ORAL DAILY
Status: DISCONTINUED | OUTPATIENT
Start: 2019-07-28 | End: 2019-08-01 | Stop reason: HOSPADM

## 2019-07-28 RX ORDER — ALBUTEROL SULFATE 2.5 MG/3ML
2.5 SOLUTION RESPIRATORY (INHALATION)
Status: COMPLETED | OUTPATIENT
Start: 2019-08-17 | End: 2019-07-30

## 2019-07-28 RX ORDER — 0.9 % SODIUM CHLORIDE 0.9 %
30 INTRAVENOUS SOLUTION INTRAVENOUS ONCE
Status: COMPLETED | OUTPATIENT
Start: 2019-07-28 | End: 2019-07-28

## 2019-07-28 RX ORDER — DEXTROSE MONOHYDRATE 50 MG/ML
100 INJECTION, SOLUTION INTRAVENOUS PRN
Status: DISCONTINUED | OUTPATIENT
Start: 2019-07-28 | End: 2019-08-01 | Stop reason: HOSPADM

## 2019-07-28 RX ORDER — METHYLPREDNISOLONE SODIUM SUCCINATE 125 MG/2ML
125 INJECTION, POWDER, LYOPHILIZED, FOR SOLUTION INTRAMUSCULAR; INTRAVENOUS ONCE
Status: COMPLETED | OUTPATIENT
Start: 2019-07-28 | End: 2019-07-28

## 2019-07-28 RX ORDER — QUETIAPINE FUMARATE 25 MG/1
25 TABLET, FILM COATED ORAL NIGHTLY
Status: DISCONTINUED | OUTPATIENT
Start: 2019-07-28 | End: 2019-08-01 | Stop reason: HOSPADM

## 2019-07-28 RX ORDER — SULFAMETHOXAZOLE AND TRIMETHOPRIM 400; 80 MG/1; MG/1
1 TABLET ORAL EVERY 12 HOURS SCHEDULED
Status: DISCONTINUED | OUTPATIENT
Start: 2019-07-28 | End: 2019-07-28 | Stop reason: ALTCHOICE

## 2019-07-28 RX ORDER — METOPROLOL SUCCINATE 25 MG/1
25 TABLET, EXTENDED RELEASE ORAL DAILY
Status: DISCONTINUED | OUTPATIENT
Start: 2019-07-28 | End: 2019-08-01 | Stop reason: HOSPADM

## 2019-07-28 RX ORDER — OXYCODONE HYDROCHLORIDE AND ACETAMINOPHEN 5; 325 MG/1; MG/1
1 TABLET ORAL ONCE
Status: COMPLETED | OUTPATIENT
Start: 2019-07-28 | End: 2019-07-28

## 2019-07-28 RX ORDER — SODIUM CHLORIDE 0.9 % (FLUSH) 0.9 %
10 SYRINGE (ML) INJECTION EVERY 12 HOURS SCHEDULED
Status: DISCONTINUED | OUTPATIENT
Start: 2019-07-28 | End: 2019-08-01 | Stop reason: HOSPADM

## 2019-07-28 RX ORDER — CLOPIDOGREL BISULFATE 75 MG/1
75 TABLET ORAL DAILY
Status: DISCONTINUED | OUTPATIENT
Start: 2019-07-28 | End: 2019-08-01 | Stop reason: HOSPADM

## 2019-07-28 RX ORDER — AMLODIPINE BESYLATE 5 MG/1
5 TABLET ORAL DAILY
Status: DISCONTINUED | OUTPATIENT
Start: 2019-07-28 | End: 2019-08-01 | Stop reason: HOSPADM

## 2019-07-28 RX ORDER — ONDANSETRON 2 MG/ML
4 INJECTION INTRAMUSCULAR; INTRAVENOUS
Status: DISCONTINUED | OUTPATIENT
Start: 2019-07-28 | End: 2019-07-28

## 2019-07-28 RX ADMIN — RANOLAZINE 1000 MG: 500 TABLET, FILM COATED, EXTENDED RELEASE ORAL at 22:08

## 2019-07-28 RX ADMIN — ALBUTEROL SULFATE 2.5 MG: 2.5 SOLUTION RESPIRATORY (INHALATION) at 01:33

## 2019-07-28 RX ADMIN — INSULIN LISPRO 10 UNITS: 100 INJECTION, SOLUTION INTRAVENOUS; SUBCUTANEOUS at 18:29

## 2019-07-28 RX ADMIN — HYDRALAZINE HYDROCHLORIDE 50 MG: 25 TABLET, FILM COATED ORAL at 22:13

## 2019-07-28 RX ADMIN — OXYCODONE HYDROCHLORIDE AND ACETAMINOPHEN 1 TABLET: 5; 325 TABLET ORAL at 04:21

## 2019-07-28 RX ADMIN — RANOLAZINE 1000 MG: 500 TABLET, FILM COATED, EXTENDED RELEASE ORAL at 09:00

## 2019-07-28 RX ADMIN — INSULIN LISPRO 12 UNITS: 100 INJECTION, SOLUTION INTRAVENOUS; SUBCUTANEOUS at 17:22

## 2019-07-28 RX ADMIN — NITROGLYCERIN 0.4 MG: 0.4 TABLET, ORALLY DISINTEGRATING SUBLINGUAL at 07:45

## 2019-07-28 RX ADMIN — METHYLPREDNISOLONE SODIUM SUCCINATE 125 MG: 125 INJECTION, POWDER, FOR SOLUTION INTRAMUSCULAR; INTRAVENOUS at 01:19

## 2019-07-28 RX ADMIN — TOPIRAMATE 100 MG: 100 TABLET, FILM COATED ORAL at 08:59

## 2019-07-28 RX ADMIN — PIPERACILLIN SODIUM AND TAZOBACTAM SODIUM 3.38 G: 3; .375 INJECTION, POWDER, FOR SOLUTION INTRAVENOUS at 01:45

## 2019-07-28 RX ADMIN — IPRATROPIUM BROMIDE AND ALBUTEROL SULFATE 1 AMPULE: .5; 3 SOLUTION RESPIRATORY (INHALATION) at 01:34

## 2019-07-28 RX ADMIN — ENOXAPARIN SODIUM 30 MG: 30 INJECTION SUBCUTANEOUS at 09:01

## 2019-07-28 RX ADMIN — AMLODIPINE BESYLATE 5 MG: 5 TABLET ORAL at 09:00

## 2019-07-28 RX ADMIN — METOPROLOL SUCCINATE 25 MG: 25 TABLET, EXTENDED RELEASE ORAL at 09:00

## 2019-07-28 RX ADMIN — OXYCODONE HYDROCHLORIDE AND ACETAMINOPHEN 1 TABLET: 7.5; 325 TABLET ORAL at 22:11

## 2019-07-28 RX ADMIN — HYDRALAZINE HYDROCHLORIDE 50 MG: 25 TABLET, FILM COATED ORAL at 09:00

## 2019-07-28 RX ADMIN — DULOXETINE HYDROCHLORIDE 60 MG: 30 CAPSULE, DELAYED RELEASE ORAL at 09:00

## 2019-07-28 RX ADMIN — BUPROPION HYDROCHLORIDE 150 MG: 150 TABLET, FILM COATED, EXTENDED RELEASE ORAL at 09:00

## 2019-07-28 RX ADMIN — METHYLPREDNISOLONE SODIUM SUCCINATE 40 MG: 40 INJECTION, POWDER, FOR SOLUTION INTRAMUSCULAR; INTRAVENOUS at 17:23

## 2019-07-28 RX ADMIN — MORPHINE SULFATE 4 MG: 4 INJECTION, SOLUTION INTRAMUSCULAR; INTRAVENOUS at 02:32

## 2019-07-28 RX ADMIN — CLOPIDOGREL BISULFATE 75 MG: 75 TABLET ORAL at 09:00

## 2019-07-28 RX ADMIN — MOMETASONE FUROATE AND FORMOTEROL FUMARATE DIHYDRATE 2 PUFF: 100; 5 AEROSOL RESPIRATORY (INHALATION) at 20:03

## 2019-07-28 RX ADMIN — MORPHINE SULFATE 4 MG: 4 INJECTION, SOLUTION INTRAMUSCULAR; INTRAVENOUS at 01:20

## 2019-07-28 RX ADMIN — SODIUM CHLORIDE: 9 INJECTION, SOLUTION INTRAVENOUS at 05:32

## 2019-07-28 RX ADMIN — SODIUM CHLORIDE, PRESERVATIVE FREE 10 ML: 5 INJECTION INTRAVENOUS at 09:00

## 2019-07-28 RX ADMIN — LEVOFLOXACIN 500 MG: 500 TABLET, FILM COATED ORAL at 08:59

## 2019-07-28 RX ADMIN — INSULIN GLARGINE 60 UNITS: 100 INJECTION, SOLUTION SUBCUTANEOUS at 18:30

## 2019-07-28 RX ADMIN — ASPIRIN 81 MG: 81 TABLET, COATED ORAL at 09:00

## 2019-07-28 RX ADMIN — MOMETASONE FUROATE AND FORMOTEROL FUMARATE DIHYDRATE 2 PUFF: 100; 5 AEROSOL RESPIRATORY (INHALATION) at 07:51

## 2019-07-28 RX ADMIN — ISOSORBIDE MONONITRATE 30 MG: 30 TABLET, EXTENDED RELEASE ORAL at 09:00

## 2019-07-28 RX ADMIN — SODIUM CHLORIDE, POTASSIUM CHLORIDE, SODIUM LACTATE AND CALCIUM CHLORIDE: 600; 310; 30; 20 INJECTION, SOLUTION INTRAVENOUS at 14:34

## 2019-07-28 RX ADMIN — ONDANSETRON 4 MG: 2 INJECTION INTRAMUSCULAR; INTRAVENOUS at 04:21

## 2019-07-28 RX ADMIN — TOPIRAMATE 100 MG: 100 TABLET, FILM COATED ORAL at 22:07

## 2019-07-28 RX ADMIN — OXYCODONE HYDROCHLORIDE AND ACETAMINOPHEN 1 TABLET: 7.5; 325 TABLET ORAL at 11:01

## 2019-07-28 RX ADMIN — QUETIAPINE FUMARATE 25 MG: 25 TABLET ORAL at 22:07

## 2019-07-28 RX ADMIN — METHYLPREDNISOLONE SODIUM SUCCINATE 40 MG: 40 INJECTION, POWDER, FOR SOLUTION INTRAMUSCULAR; INTRAVENOUS at 10:10

## 2019-07-28 RX ADMIN — SODIUM CHLORIDE: 9 INJECTION, SOLUTION INTRAVENOUS at 01:46

## 2019-07-28 ASSESSMENT — PAIN DESCRIPTION - LOCATION
LOCATION: BACK;CHEST
LOCATION: BACK
LOCATION: CHEST
LOCATION: BACK
LOCATION: BACK

## 2019-07-28 ASSESSMENT — PAIN DESCRIPTION - PROGRESSION
CLINICAL_PROGRESSION: NOT CHANGED

## 2019-07-28 ASSESSMENT — PAIN SCALES - GENERAL
PAINLEVEL_OUTOF10: 5
PAINLEVEL_OUTOF10: 9
PAINLEVEL_OUTOF10: 8
PAINLEVEL_OUTOF10: 8
PAINLEVEL_OUTOF10: 5
PAINLEVEL_OUTOF10: 9
PAINLEVEL_OUTOF10: 7
PAINLEVEL_OUTOF10: 6
PAINLEVEL_OUTOF10: 5
PAINLEVEL_OUTOF10: 7

## 2019-07-28 ASSESSMENT — PAIN DESCRIPTION - ORIENTATION
ORIENTATION: MID
ORIENTATION: MID;LOWER
ORIENTATION: MID
ORIENTATION: LOWER
ORIENTATION: MID

## 2019-07-28 ASSESSMENT — PAIN DESCRIPTION - ONSET
ONSET: ON-GOING

## 2019-07-28 ASSESSMENT — PAIN DESCRIPTION - FREQUENCY
FREQUENCY: CONTINUOUS

## 2019-07-28 ASSESSMENT — PAIN DESCRIPTION - PAIN TYPE
TYPE: CHRONIC PAIN
TYPE: CHRONIC PAIN
TYPE: ACUTE PAIN;CHRONIC PAIN
TYPE: ACUTE PAIN
TYPE: ACUTE PAIN

## 2019-07-28 ASSESSMENT — PAIN DESCRIPTION - DESCRIPTORS
DESCRIPTORS: ACHING

## 2019-07-28 ASSESSMENT — PAIN - FUNCTIONAL ASSESSMENT
PAIN_FUNCTIONAL_ASSESSMENT: ACTIVITIES ARE NOT PREVENTED

## 2019-07-28 NOTE — ED PROVIDER NOTES
Emergency Physician Note    Chief Complaint  Shortness of Breath (Given prednisone and breathing tx in route by EMS. Onset 1 hour prior to calling, alert and oriented on arrival comnplains of feeling \"hot\")       History of Present Illness  Cristina Montana is a 59 y.o. female who presents to the ED for this of breath and chest pain. Patient states that she was walking around downtown and got extremely lightheaded to the point where she had to be carried to the car. She did not syncopized. When she got home she started feeling short of breath she tried both of her rescue inhalers without relief. Patient does not use continuous oxygen at home and she does not have nebulizer at home. She is also reporting chest pain that she describes as tightness is midsternal, she associates it with her difficulty with breathing. Patient admitted 16 days ago for chest pain and ACS rule out. Patient has a history of CAD. Denies fever, chills, malaise, chest pain,cough, abdominal pain, nausea, vomiting, diarrhea, headache, sore throat, dysuria, back pain, rash. No palliative/provocative factors. Unless otherwise stated in this report or unable to obtain because of the patient's clinical or mental status as evidenced by the medical record, this patient's positive and negative responses for review of systems, constitutional, psych, eyes, ENT, cardiovascular, respiratory, gastrointestinal, neurological, genitourinary, musculoskeletal, integument systems and systems related to the presenting problem are either stated in the preceding paragraph or were not pertinent or were negative for the symptoms and/or complaints related to the medical problem. I have reviewed the following from the nursing documentation:      Prior to Admission medications    Medication Sig Start Date End Date Taking?  Authorizing Provider   atorvastatin (LIPITOR) 80 MG tablet TAKE 1 TABLET BY MOUTH ABIMBOLA VICTORIA 7/11/19   Kiesha Armstrong MD [facility]      BP (!) 168/97      Pulse 110      Resp 16      Temp 98.1 °F (36.7 °C)      Temp Source Oral      SpO2 94 %      Weight       Height       Head Circumference       Peak Flow       Pain Score       Pain Loc       Pain Edu? Excl. in [address]? GENERAL:  Awake, alert. Well developed, well nourished with no apparent distress. HENT:  Normocephalic, Atraumatic, no lacerations. EYES:  Conjunctiva normal, Pupils equal round and reactive to light, extraocular movements normal.  NECK:  Trachea is midline. No stridor. CHEST:  Regular rate and regular rhythm, no murmurs/rubs/gallops, normal S1/S2, chest wall non-tender. LUNGS:  Mild respiratory distress. No abdominal retractions, no sternal retractions. Minutes breath sounds bilaterally with poor air movement with very minimal end expiratory wheezing, no rhochi, no rales  ABDOMEN:  Soft, non-tender, non-distended. No guarding and no rebound. No costovertebral angle tenderness to palpation. Normal BS, no organomegaly, no abdominal masses  EXTREMITIES:  Normal range of motion, no edema, no tenderness, no deformity, distal pulses present and equal bilaterally. Moves extremities x4 with purpose. SKIN: Warm, dry and intact. NEUROLOGIC: Normal mental status. Moving all extremities to command. Alert and oriented x4 without focal motor deficit or gross sensory deficit. Normal speech. PSYCHIATRIC: Not anxious, normal mood and affect, thoughts are linear and organized, without delusions/hallucinations, responds appropriately to questions      LABS and DIAGNOSTIC RESULTS  EKG  The Ekg interpreted by me shows  sinus tachycardia, cabz=651 with a rate of 108  Axis is   Normal  QTc is  within an acceptable range  Intervals and Durations are unremarkable. ST Segments: normal and poor quality EKG was significant artifact due to respiratory distress  Delta waves, Brugada Syndrome, and Short AR are not present.   Prior EKG to compare with

## 2019-07-28 NOTE — ED NOTES
Pt presents to ED with SOB and mid back pain beginning tonight brought on by being outside in heat today  + cardiac hx  + COPD  Room air at home, 2L NC in ED  Given Albuterol and prednisone in route  Intermittent mid sternal chest pain  Recently admitted for elevated troponin NSTEMI  AOX4, ambulatory, continent of bowl and bladder, lives by self in private residence       Meme Fetdasia, Counts include [facility] beds at the [facility]  07/28/19 0306

## 2019-07-28 NOTE — FLOWSHEET NOTE
07/28/19 1155   Encounter Summary   Services provided to: Patient   Referral/Consult From: Nurse   Support System Family members   Continue Visiting Yes  (pt confirmed AD docs on file, wants rtrn visit 7/28 CL)   Complexity of Encounter Moderate   Length of Encounter 15 minutes   Spiritual/Druze   Type Spiritual support   Assessment Calm; Approachable   Intervention Active listening   Outcome Engaged in conversation   Advance Directives (For Healthcare)   Healthcare Directive Yes, patient has an advance directive for healthcare treatment   Type of Healthcare Directive Durable power of  for health care;Living will  (pt confirmed use HCPOA on file dated 2017)   Copy in Chart Yes, copy in chart  (copy in Epic under Code tab)   Chart Copy Status : Active   Date Reviewed and Current: 07/28/19   Electronically signed by Samra Lou on 7/28/2019 at 11:58 AM

## 2019-07-28 NOTE — CARE COORDINATION
INITIAL CASE MANAGEMENT ASSESSMENT    Reviewed chart, met with patient to assess possible discharge needs. Explained Case Management role/services. Living Situation: Patient lives with her dtg and 3 grandchildren in a house with 11 steps to bedroom. ADLs: Assistance from [facility] through [address]     DME: 4WW, life alert button    PT/OT Recs: None ordered     Active Services: Active with [facility] for HHA 5 hrs/day, 5 days/wk     Transportation: Dtg will transport at discharge     Medications: No barriers to taking/obtaining medications    PCP: Dr. Ana Rosa Lombardi      HD/PD: N/A    PLAN/COMMENTS: No needs identified at this time. SW/CM provided contact information for patient or family to call with any questions. SW/CM will follow and assist as needed.       Electronically signed by CUATE Rogers on 7/28/2019 at 10:27 AM

## 2019-07-28 NOTE — PROGRESS NOTES
Patient was admitted on 7/28 for sepsis. Patient is A&O x4. Patient does complain of pain, rated 8 of 10 in lower back and mid sternal chest pain. Patient is on bedrest.  Patient is continent of bowels and bladder. Patient has EJ left neck. Pt is on 2L O2. Patient is sleeping soundly after admission. Call light is within reach. Patient calls as needed.

## 2019-07-28 NOTE — H&P
Continuous Blood Gluc  (FREESTYLE LISSETT 14 DAY READER) DAYO 1 kit by Does not apply route every 14 days 6/13/19   Maranda Francois MD   Continuous Blood Gluc Sensor (FREESTYLE LISSETT 14 DAY SENSOR) MISC 1 each by Does not apply route every 14 days 6/13/19   Maranda Francois MD   insulin glargine [facility]) 100 UNIT/ML injection pen Inject 60 Units into the skin 2 times daily 6/13/19   Maranda Francois MD   insulin aspart (NOVOLOG FLEXPEN) 100 UNIT/ML injection pen Inject 5-10 Units into the skin 3 times daily (before meals) 6/13/19   Maranda Francois MD   ondansetron (ZOFRAN) 8 MG tablet TAKE 1 TABLET BY MOUTH EVERY 8 HOURS AS NEEDED FOR NAUSEA OR VOMITING 4/22/19   Maranda Francois MD   RANEXA 1000 MG extended release tablet TAKE 1 TABLET BY MOUTH 2 TIMES DAILY 4/11/19   Marin Toure MD   ASPIRIN LOW DOSE 81 MG EC tablet TAKE 1 TABLET BY MOUTH DA JULIEN 4/11/19   Maranda Francois MD   metoprolol succinate (TOPROL XL) 50 MG extended release tablet TAKE 1 TABLET BY MOUTH DAILY 3/15/19   Dora Dahl MD   hydrALAZINE (APRESOLINE) 50 MG tablet Take 1 tablet by mouth 2 times daily 3/6/19   Maranda Francois MD   torsemide (DEMADEX) 20 MG tablet Take 1 tablet by mouth daily 3/6/19   Maranda Francois MD   isosorbide mononitrate (IMDUR) 30 MG extended release tablet Take 1 tablet by mouth daily 3/6/19   Maranda Francois MD   amLODIPine (NORVASC) 10 MG tablet Take 0.5 tablets by mouth daily 3/6/19   Maranda Francois MD   butalbital-aspirin-caffeine [facility]) -40 MG per capsule Take 1 capsule by mouth every 6 hours as needed for Headaches for up to 14 days. . 2/20/19 5/5/19  Maranda Francois MD   Continuous Blood Gluc  (FREESTYLE LISSETT 14 DAY READER) DAYO TEST THREE TIMES DAILY  250.00  E11.9 2/5/19   Maranda Francois MD   Continuous Blood Gluc Sensor (FREESTYLE LISSETT 14 DAY SENSOR) MISC Test three times daily   250.00 E 11.9 2/5/19   Maranda Francois MD   rosuvastatin (CRESTOR) 40 MG tablet Take 1 tablet by mouth nightly 2/5/19   Miguel Dunn MD   nitroGLYCERIN (NITROSTAT) 0.4 MG SL tablet PLACE 1 TABLET UNDER THE TONGUE EVERY 5 MINUTES AS NEEDED FOR CHEST PAIN. 1/10/19   Miguel Dunn MD   [employer] MINI PEN NEEDLES 31G X 6 MM MISC USE THREE TIMES A DAY 1/10/19   Miguel Dunn MD   budesonide-formoterol [facility]) 160-4.5 MCG/ACT AERO Inhale 2 puffs into the lungs daily  Patient taking differently: Inhale 2 puffs into the lungs daily as needed  11/7/18   Miguel Dunn MD   albuterol sulfate HFA (PROAIR HFA) 108 (90 Base) MCG/ACT inhaler Inhale 2 puffs into the lungs every 6 hours as needed for Wheezing  Patient taking differently: Inhale 2 puffs into the lungs every 6 hours as needed for Wheezing  11/7/18   Miguel Dunn MD   chlorthalidone (HYGROTON) 25 MG tablet TAKE ONE TABLET BY MOUTH DAILY 10/12/18   Andreina Triana MD   clopidogrel (PLAVIX) 75 MG tablet Take 1 tablet by mouth daily 8/13/18   Miguel Dunn MD   ranitidine (ZANTAC) 150 MG tablet Take 1 tablet by mouth 2 times daily as needed for Heartburn  Patient taking differently: Take 150 mg by mouth 2 times daily  8/13/18   Miguel Dunn MD       Allergies:  Patient has no known allergies. Social History:      The patient currently lives with family    TOBACCO:   reports that she quit smoking about 12 months ago. Her smoking use included cigarettes. She has a 17.50 pack-year smoking history. She has never used smokeless tobacco.  ETOH:   reports that she does not drink alcohol. Family History:       Reviewed in detail and negative for DM, CAD, Cancer, CVA. Positive as follows:        Problem Relation Age of Onset    Diabetes Mother     Hypertension Mother     High Cholesterol Mother     Stroke Mother     Cancer Mother        REVIEW OF SYSTEMS:   Pertinent positives as noted in the HPI. All other systems reviewed and negative.     PHYSICAL EXAM PERFORMED:    BP (!) 174/77   Pulse 84   Temp 98.4 °F (36.9 °C) (Oral)   Resp 18   Ht 5' (1.524 m)   Wt 139 lb 1.8 oz (63.1 kg)   SpO2 100%   BMI 27.17 kg/m²     General appearance: Anxious female in no acute distress  HEENT:  Normal cephalic, atraumatic without obvious deformity. Pupils equal, round, and reactive to light. Extra ocular muscles intact. Conjunctivae/corneas clear. Neck: Supple, with full range of motion. No jugular venous distention. Trachea midline. Respiratory: Scattered expiratory wheezes and inspiratory wheezes  Cardiovascular:  Regular rate and rhythm with normal S1/S2 without murmurs, rubs or gallops. Abdomen: Soft, non-tender, non-distended with normal bowel sounds. Musculoskeletal:  No clubbing, cyanosis or edema bilaterally. Full range of motion without deformity. Small hematoma along the medial aspect of the right elbow  Skin: Skin color, texture, turgor normal.  No rashes or lesions. Neurologic:  Neurovascularly intact without any focal sensory/motor deficits. Cranial nerves: II-XII intact, grossly non-focal.  Psychiatric:  Alert and oriented, thought content appropriate, normal insight  Capillary Refill: Brisk,< 3 seconds   Peripheral Pulses: +2 palpable, equal bilaterally       Labs:     Recent Labs     07/28/19 0125   WBC 6.6   HGB 10.3*   HCT 30.9*        Recent Labs     07/28/19 0125   *   K 3.5      CO2 15*   BUN 31*   CREATININE 2.1*   CALCIUM 9.2     Recent Labs     07/28/19 0125   AST 38*   ALT 35   BILITOT 0.3   ALKPHOS 143*     No results for input(s): INR in the last 72 hours.   Recent Labs     07/28/19 0125   TROPONINI <0.01       Urinalysis:      Lab Results   Component Value Date    NITRU Negative 07/21/2017    WBCUA 6 07/21/2017    BACTERIA 1+ 07/21/2017    RBCUA 1 07/21/2017    BLOODU Negative 07/21/2017    SPECGRAV 1.016 07/21/2017    GLUCOSEU 500 07/21/2017    GLUCOSEU NEGATIVE 05/14/2012       Radiology:     Independently reviewed by me    XR FEMUR RIGHT (MIN 2 VIEWS)   Final

## 2019-07-29 ENCOUNTER — APPOINTMENT (OUTPATIENT)
Dept: GENERAL RADIOLOGY | Age: 64
DRG: 202 | End: 2019-07-29
Payer: COMMERCIAL

## 2019-07-29 DIAGNOSIS — G89.4 CHRONIC PAIN SYNDROME: ICD-10-CM

## 2019-07-29 PROBLEM — J96.01 ACUTE RESPIRATORY FAILURE WITH HYPOXIA (HCC): Status: ACTIVE | Noted: 2019-07-29

## 2019-07-29 PROBLEM — R53.1 GENERALIZED WEAKNESS: Status: ACTIVE | Noted: 2019-07-29

## 2019-07-29 LAB
ANION GAP SERPL CALCULATED.3IONS-SCNC: 14 MMOL/L (ref 3–16)
BUN BLDV-MCNC: 25 MG/DL (ref 7–20)
CALCIUM SERPL-MCNC: 9.1 MG/DL (ref 8.3–10.6)
CHLORIDE BLD-SCNC: 103 MMOL/L (ref 99–110)
CO2: 17 MMOL/L (ref 21–32)
CREAT SERPL-MCNC: 1.4 MG/DL (ref 0.6–1.2)
GFR AFRICAN AMERICAN: 46
GFR NON-AFRICAN AMERICAN: 38
GLUCOSE BLD-MCNC: 207 MG/DL (ref 70–99)
GLUCOSE BLD-MCNC: 220 MG/DL (ref 70–99)
GLUCOSE BLD-MCNC: 222 MG/DL (ref 70–99)
GLUCOSE BLD-MCNC: 310 MG/DL (ref 70–99)
GLUCOSE BLD-MCNC: 412 MG/DL (ref 70–99)
HCT VFR BLD CALC: 28.7 % (ref 36–48)
HEMOGLOBIN: 9.3 G/DL (ref 12–16)
LACTIC ACID: 3 MMOL/L (ref 0.4–2)
LACTIC ACID: 3 MMOL/L (ref 0.4–2)
LACTIC ACID: 4 MMOL/L (ref 0.4–2)
MCH RBC QN AUTO: 33.2 PG (ref 26–34)
MCHC RBC AUTO-ENTMCNC: 32.3 G/DL (ref 31–36)
MCV RBC AUTO: 102.8 FL (ref 80–100)
PDW BLD-RTO: 15.5 % (ref 12.4–15.4)
PERFORMED ON: ABNORMAL
PLATELET # BLD: 182 K/UL (ref 135–450)
PMV BLD AUTO: 8.9 FL (ref 5–10.5)
POTASSIUM SERPL-SCNC: 4.4 MMOL/L (ref 3.5–5.1)
RBC # BLD: 2.79 M/UL (ref 4–5.2)
REASON FOR REJECTION: NORMAL
REJECTED TEST: NORMAL
SALICYLATE, SERUM: <0.3 MG/DL (ref 15–30)
SODIUM BLD-SCNC: 134 MMOL/L (ref 136–145)
WBC # BLD: 6.3 K/UL (ref 4–11)

## 2019-07-29 PROCEDURE — 2580000003 HC RX 258: Performed by: NURSE PRACTITIONER

## 2019-07-29 PROCEDURE — 94669 MECHANICAL CHEST WALL OSCILL: CPT

## 2019-07-29 PROCEDURE — 71046 X-RAY EXAM CHEST 2 VIEWS: CPT

## 2019-07-29 PROCEDURE — 6370000000 HC RX 637 (ALT 250 FOR IP): Performed by: FAMILY MEDICINE

## 2019-07-29 PROCEDURE — G0480 DRUG TEST DEF 1-7 CLASSES: HCPCS

## 2019-07-29 PROCEDURE — 1200000000 HC SEMI PRIVATE

## 2019-07-29 PROCEDURE — 94761 N-INVAS EAR/PLS OXIMETRY MLT: CPT

## 2019-07-29 PROCEDURE — 6370000000 HC RX 637 (ALT 250 FOR IP): Performed by: INTERNAL MEDICINE

## 2019-07-29 PROCEDURE — 2700000000 HC OXYGEN THERAPY PER DAY

## 2019-07-29 PROCEDURE — 94640 AIRWAY INHALATION TREATMENT: CPT

## 2019-07-29 PROCEDURE — 2580000003 HC RX 258: Performed by: INTERNAL MEDICINE

## 2019-07-29 PROCEDURE — 99232 SBSQ HOSP IP/OBS MODERATE 35: CPT | Performed by: INTERNAL MEDICINE

## 2019-07-29 PROCEDURE — 80048 BASIC METABOLIC PNL TOTAL CA: CPT

## 2019-07-29 PROCEDURE — 83930 ASSAY OF BLOOD OSMOLALITY: CPT

## 2019-07-29 PROCEDURE — 85027 COMPLETE CBC AUTOMATED: CPT

## 2019-07-29 PROCEDURE — 36415 COLL VENOUS BLD VENIPUNCTURE: CPT

## 2019-07-29 PROCEDURE — 6360000002 HC RX W HCPCS: Performed by: INTERNAL MEDICINE

## 2019-07-29 PROCEDURE — 83605 ASSAY OF LACTIC ACID: CPT

## 2019-07-29 RX ORDER — SODIUM CHLORIDE FOR INHALATION 3 %
4 VIAL, NEBULIZER (ML) INHALATION 2 TIMES DAILY
Status: DISCONTINUED | OUTPATIENT
Start: 2019-07-29 | End: 2019-08-01 | Stop reason: HOSPADM

## 2019-07-29 RX ORDER — BUPROPION HYDROCHLORIDE 150 MG/1
150 TABLET ORAL EVERY MORNING
Qty: 30 TABLET | Refills: 1 | Status: SHIPPED | OUTPATIENT
Start: 2019-07-29 | End: 2019-08-06 | Stop reason: SDUPTHER

## 2019-07-29 RX ORDER — METHYLPREDNISOLONE SODIUM SUCCINATE 40 MG/ML
40 INJECTION, POWDER, LYOPHILIZED, FOR SOLUTION INTRAMUSCULAR; INTRAVENOUS EVERY 12 HOURS
Status: DISCONTINUED | OUTPATIENT
Start: 2019-07-29 | End: 2019-07-31

## 2019-07-29 RX ADMIN — SODIUM CHLORIDE SOLN NEBU 3% 4 ML: 3 NEBU SOLN at 19:47

## 2019-07-29 RX ADMIN — SODIUM CHLORIDE, POTASSIUM CHLORIDE, SODIUM LACTATE AND CALCIUM CHLORIDE: 600; 310; 30; 20 INJECTION, SOLUTION INTRAVENOUS at 17:40

## 2019-07-29 RX ADMIN — METHYLPREDNISOLONE SODIUM SUCCINATE 40 MG: 40 INJECTION, POWDER, FOR SOLUTION INTRAMUSCULAR; INTRAVENOUS at 08:41

## 2019-07-29 RX ADMIN — RANOLAZINE 1000 MG: 500 TABLET, FILM COATED, EXTENDED RELEASE ORAL at 08:34

## 2019-07-29 RX ADMIN — TOPIRAMATE 100 MG: 100 TABLET, FILM COATED ORAL at 08:40

## 2019-07-29 RX ADMIN — BUPROPION HYDROCHLORIDE 150 MG: 150 TABLET, FILM COATED, EXTENDED RELEASE ORAL at 08:39

## 2019-07-29 RX ADMIN — INSULIN GLARGINE 60 UNITS: 100 INJECTION, SOLUTION SUBCUTANEOUS at 21:51

## 2019-07-29 RX ADMIN — INSULIN LISPRO 3 UNITS: 100 INJECTION, SOLUTION INTRAVENOUS; SUBCUTANEOUS at 21:51

## 2019-07-29 RX ADMIN — QUETIAPINE FUMARATE 25 MG: 25 TABLET ORAL at 22:58

## 2019-07-29 RX ADMIN — SODIUM CHLORIDE, POTASSIUM CHLORIDE, SODIUM LACTATE AND CALCIUM CHLORIDE: 600; 310; 30; 20 INJECTION, SOLUTION INTRAVENOUS at 00:52

## 2019-07-29 RX ADMIN — TOPIRAMATE 100 MG: 100 TABLET, FILM COATED ORAL at 21:48

## 2019-07-29 RX ADMIN — HYDRALAZINE HYDROCHLORIDE 50 MG: 25 TABLET, FILM COATED ORAL at 08:40

## 2019-07-29 RX ADMIN — INSULIN LISPRO 6 UNITS: 100 INJECTION, SOLUTION INTRAVENOUS; SUBCUTANEOUS at 12:04

## 2019-07-29 RX ADMIN — MOMETASONE FUROATE AND FORMOTEROL FUMARATE DIHYDRATE 2 PUFF: 100; 5 AEROSOL RESPIRATORY (INHALATION) at 07:59

## 2019-07-29 RX ADMIN — OXYCODONE HYDROCHLORIDE AND ACETAMINOPHEN 1 TABLET: 7.5; 325 TABLET ORAL at 09:05

## 2019-07-29 RX ADMIN — CLOPIDOGREL BISULFATE 75 MG: 75 TABLET ORAL at 08:40

## 2019-07-29 RX ADMIN — MOMETASONE FUROATE AND FORMOTEROL FUMARATE DIHYDRATE 2 PUFF: 100; 5 AEROSOL RESPIRATORY (INHALATION) at 19:48

## 2019-07-29 RX ADMIN — OXYCODONE HYDROCHLORIDE AND ACETAMINOPHEN 1 TABLET: 7.5; 325 TABLET ORAL at 21:47

## 2019-07-29 RX ADMIN — INSULIN GLARGINE 60 UNITS: 100 INJECTION, SOLUTION SUBCUTANEOUS at 08:43

## 2019-07-29 RX ADMIN — METHYLPREDNISOLONE SODIUM SUCCINATE 40 MG: 40 INJECTION, POWDER, FOR SOLUTION INTRAMUSCULAR; INTRAVENOUS at 00:38

## 2019-07-29 RX ADMIN — METOPROLOL SUCCINATE 25 MG: 25 TABLET, EXTENDED RELEASE ORAL at 08:35

## 2019-07-29 RX ADMIN — ENOXAPARIN SODIUM 30 MG: 30 INJECTION SUBCUTANEOUS at 08:41

## 2019-07-29 RX ADMIN — ASPIRIN 81 MG: 81 TABLET, COATED ORAL at 08:40

## 2019-07-29 RX ADMIN — ISOSORBIDE MONONITRATE 30 MG: 30 TABLET, EXTENDED RELEASE ORAL at 08:33

## 2019-07-29 RX ADMIN — DULOXETINE HYDROCHLORIDE 60 MG: 30 CAPSULE, DELAYED RELEASE ORAL at 08:33

## 2019-07-29 RX ADMIN — AMLODIPINE BESYLATE 5 MG: 5 TABLET ORAL at 08:40

## 2019-07-29 RX ADMIN — INSULIN LISPRO 6 UNITS: 100 INJECTION, SOLUTION INTRAVENOUS; SUBCUTANEOUS at 17:22

## 2019-07-29 RX ADMIN — SODIUM CHLORIDE SOLN NEBU 3% 4 ML: 3 NEBU SOLN at 12:15

## 2019-07-29 RX ADMIN — HYDRALAZINE HYDROCHLORIDE 50 MG: 25 TABLET, FILM COATED ORAL at 21:48

## 2019-07-29 RX ADMIN — METHYLPREDNISOLONE SODIUM SUCCINATE 40 MG: 40 INJECTION, POWDER, FOR SOLUTION INTRAMUSCULAR; INTRAVENOUS at 22:58

## 2019-07-29 RX ADMIN — INSULIN LISPRO 18 UNITS: 100 INJECTION, SOLUTION INTRAVENOUS; SUBCUTANEOUS at 08:42

## 2019-07-29 RX ADMIN — RANOLAZINE 1000 MG: 500 TABLET, FILM COATED, EXTENDED RELEASE ORAL at 21:48

## 2019-07-29 ASSESSMENT — PAIN DESCRIPTION - ONSET
ONSET: ON-GOING
ONSET: ON-GOING

## 2019-07-29 ASSESSMENT — PAIN DESCRIPTION - ORIENTATION
ORIENTATION: LOWER
ORIENTATION: LOWER

## 2019-07-29 ASSESSMENT — PAIN SCALES - GENERAL
PAINLEVEL_OUTOF10: 0
PAINLEVEL_OUTOF10: 2
PAINLEVEL_OUTOF10: 10
PAINLEVEL_OUTOF10: 0
PAINLEVEL_OUTOF10: 0
PAINLEVEL_OUTOF10: 2
PAINLEVEL_OUTOF10: 8
PAINLEVEL_OUTOF10: 3
PAINLEVEL_OUTOF10: 2

## 2019-07-29 ASSESSMENT — PAIN DESCRIPTION - PAIN TYPE
TYPE: ACUTE PAIN;CHRONIC PAIN
TYPE: ACUTE PAIN;CHRONIC PAIN

## 2019-07-29 ASSESSMENT — PAIN DESCRIPTION - LOCATION
LOCATION: BACK;CHEST
LOCATION: BACK;CHEST

## 2019-07-29 ASSESSMENT — PAIN DESCRIPTION - DESCRIPTORS
DESCRIPTORS: ACHING;DISCOMFORT
DESCRIPTORS: ACHING;DISCOMFORT

## 2019-07-29 ASSESSMENT — PAIN DESCRIPTION - FREQUENCY
FREQUENCY: INTERMITTENT
FREQUENCY: INTERMITTENT

## 2019-07-29 ASSESSMENT — PAIN DESCRIPTION - PROGRESSION
CLINICAL_PROGRESSION: NOT CHANGED
CLINICAL_PROGRESSION: NOT CHANGED

## 2019-07-29 NOTE — PROGRESS NOTES
Patient's abdomen started to swell some and skin became taut. Stomach sounds active. No pain. Notified MD who discontinued strict bedrest order and asked to ambulate patient. Due to weakness, patient was able to ambulate a short distance from the bed to the chair with a walker. Patient tolerated fairly.

## 2019-07-29 NOTE — PROGRESS NOTES
This note was transcribed using [id number] [employer]. Please disregard any translational errors.     Thank you for this consult,    Mariama Ramirez [address] [facility]

## 2019-07-29 NOTE — PROGRESS NOTES
Patient was admitted on 7/28 for sepsis. Patient is A&O x4. Patient does complain of pain, rated 7 of 10 in lower back-chronic Patient is on bedrest and uses bedpan. Patient is continent of bowels and bladder. Patient has EJ left neck. Pt is on 1L O2. Pt c/o SOB, wheezing at beginning of shift. Hospitality was notified and came to see patient. No crackles heard by NP. Blood gases were ordered. Pt O2 was  percent on 1L all shift. Patient is sleeping soundly after admission. Call light is within reach. Patient calls as needed.

## 2019-07-29 NOTE — PROGRESS NOTES
clubbing  Skin: Skin color, texture, turgor normal.  No rashes or lesions. Neurologic:  No focal weakness   Psychiatric: Alert and oriented  Capillary Refill: Brisk,< 3 seconds   Peripheral Pulses: +2 palpable, equal bilaterally       Labs:   Recent Labs     07/28/19  0125   WBC 6.6   HGB 10.3*   HCT 30.9*        Recent Labs     07/28/19  0125 07/28/19  1402   * 130*   K 3.5 4.4    96*   CO2 15* 16*   BUN 31* 25*   CREATININE 2.1* 1.6*   CALCIUM 9.2 8.7     Recent Labs     07/28/19  0125   AST 38*   ALT 35   BILITOT 0.3   ALKPHOS 143*     No results for input(s): INR in the last 72 hours. Recent Labs     07/28/19 0125   TROPONINI <0.01       Urinalysis:      Lab Results   Component Value Date    NITRU Negative 07/21/2017    WBCUA 6 07/21/2017    BACTERIA 1+ 07/21/2017    RBCUA 1 07/21/2017    BLOODU Negative 07/21/2017    SPECGRAV 1.016 07/21/2017    GLUCOSEU 500 07/21/2017    GLUCOSEU NEGATIVE 05/14/2012       Radiology:  XR FEMUR RIGHT (MIN 2 VIEWS)   Final Result   Power port in place in the anterior upper right thigh. Tip of the catheter   not demonstrated. XR CHEST PORTABLE   Final Result   Mild streaky right basilar opacity either due to atelectasis or early   pneumonia. Slight to mild bullous changes. XR CHEST STANDARD (2 VW)    (Results Pending)           Assessment/Plan:    Active Hospital Problems    Diagnosis    Sepsis ([facility] [address] [A41.9]     1. Acute respiratory failure with hypoxia, due to posdible COPD exacerbation and asthma exacerbation, iv steroids, oxygen, pulmonary consulted. 2. COPD/Asthma exacerbation, iv steroids, oxygen, pulmo following. 3. Anion gap metabolic acidosis with elevated LA, doubt sepsis as etiology, improved, awaiting for am labs. 4. Generalized weakness, multifactorial, improved some. 5. Acute renal failure, improved some. 6. DM, SS  7. Chest pain on admission, worse with breathing, likely muscular.          Diet: DIET CARB

## 2019-07-30 DIAGNOSIS — E11.40 CHRONIC PAINFUL DIABETIC NEUROPATHY (HCC): ICD-10-CM

## 2019-07-30 DIAGNOSIS — G89.4 CHRONIC PAIN SYNDROME: ICD-10-CM

## 2019-07-30 DIAGNOSIS — I25.10 CORONARY ARTERY DISEASE INVOLVING NATIVE CORONARY ARTERY OF NATIVE HEART WITHOUT ANGINA PECTORIS: ICD-10-CM

## 2019-07-30 LAB
ANION GAP SERPL CALCULATED.3IONS-SCNC: 14 MMOL/L (ref 3–16)
BUN BLDV-MCNC: 29 MG/DL (ref 7–20)
CALCIUM SERPL-MCNC: 9.4 MG/DL (ref 8.3–10.6)
CHLORIDE BLD-SCNC: 104 MMOL/L (ref 99–110)
CO2: 20 MMOL/L (ref 21–32)
CREAT SERPL-MCNC: 1.4 MG/DL (ref 0.6–1.2)
GFR AFRICAN AMERICAN: 46
GFR NON-AFRICAN AMERICAN: 38
GLUCOSE BLD-MCNC: 166 MG/DL (ref 70–99)
GLUCOSE BLD-MCNC: 207 MG/DL (ref 70–99)
GLUCOSE BLD-MCNC: 217 MG/DL (ref 70–99)
GLUCOSE BLD-MCNC: 225 MG/DL (ref 70–99)
GLUCOSE BLD-MCNC: 234 MG/DL (ref 70–99)
HCT VFR BLD CALC: 30.3 % (ref 36–48)
HEMOGLOBIN: 9.9 G/DL (ref 12–16)
LACTIC ACID: 1.8 MMOL/L (ref 0.4–2)
LACTIC ACID: 2.2 MMOL/L (ref 0.4–2)
LACTIC ACID: 3 MMOL/L (ref 0.4–2)
LACTIC ACID: 3.8 MMOL/L (ref 0.4–2)
MCH RBC QN AUTO: 33.5 PG (ref 26–34)
MCHC RBC AUTO-ENTMCNC: 32.5 G/DL (ref 31–36)
MCV RBC AUTO: 102.9 FL (ref 80–100)
OSMOLALITY: 302 MOSM/KG (ref 278–305)
PDW BLD-RTO: 15.5 % (ref 12.4–15.4)
PERFORMED ON: ABNORMAL
PLATELET # BLD: 186 K/UL (ref 135–450)
PMV BLD AUTO: 8.8 FL (ref 5–10.5)
POTASSIUM SERPL-SCNC: 4.2 MMOL/L (ref 3.5–5.1)
RBC # BLD: 2.94 M/UL (ref 4–5.2)
SODIUM BLD-SCNC: 138 MMOL/L (ref 136–145)
WBC # BLD: 9.4 K/UL (ref 4–11)

## 2019-07-30 PROCEDURE — 2580000003 HC RX 258: Performed by: NURSE PRACTITIONER

## 2019-07-30 PROCEDURE — 1200000000 HC SEMI PRIVATE

## 2019-07-30 PROCEDURE — 2580000003 HC RX 258: Performed by: INTERNAL MEDICINE

## 2019-07-30 PROCEDURE — 6360000002 HC RX W HCPCS: Performed by: INTERNAL MEDICINE

## 2019-07-30 PROCEDURE — 94760 N-INVAS EAR/PLS OXIMETRY 1: CPT

## 2019-07-30 PROCEDURE — 83605 ASSAY OF LACTIC ACID: CPT

## 2019-07-30 PROCEDURE — 6370000000 HC RX 637 (ALT 250 FOR IP): Performed by: NURSE PRACTITIONER

## 2019-07-30 PROCEDURE — 2700000000 HC OXYGEN THERAPY PER DAY

## 2019-07-30 PROCEDURE — 36415 COLL VENOUS BLD VENIPUNCTURE: CPT

## 2019-07-30 PROCEDURE — 99232 SBSQ HOSP IP/OBS MODERATE 35: CPT | Performed by: INTERNAL MEDICINE

## 2019-07-30 PROCEDURE — 85027 COMPLETE CBC AUTOMATED: CPT

## 2019-07-30 PROCEDURE — 94669 MECHANICAL CHEST WALL OSCILL: CPT

## 2019-07-30 PROCEDURE — 6370000000 HC RX 637 (ALT 250 FOR IP): Performed by: FAMILY MEDICINE

## 2019-07-30 PROCEDURE — 6370000000 HC RX 637 (ALT 250 FOR IP): Performed by: INTERNAL MEDICINE

## 2019-07-30 PROCEDURE — 80048 BASIC METABOLIC PNL TOTAL CA: CPT

## 2019-07-30 PROCEDURE — 94640 AIRWAY INHALATION TREATMENT: CPT

## 2019-07-30 PROCEDURE — 94667 MNPJ CHEST WALL 1ST: CPT

## 2019-07-30 RX ORDER — OXYCODONE AND ACETAMINOPHEN 7.5; 325 MG/1; MG/1
1 TABLET ORAL EVERY 8 HOURS PRN
COMMUNITY
End: 2019-08-06 | Stop reason: SDUPTHER

## 2019-07-30 RX ORDER — GUAIFENESIN 600 MG/1
600 TABLET, EXTENDED RELEASE ORAL ONCE
Status: COMPLETED | OUTPATIENT
Start: 2019-07-30 | End: 2019-07-30

## 2019-07-30 RX ORDER — DIVALPROEX SODIUM 250 MG/1
250 TABLET, DELAYED RELEASE ORAL 2 TIMES DAILY
COMMUNITY
End: 2019-08-29 | Stop reason: SDUPTHER

## 2019-07-30 RX ORDER — FUROSEMIDE 80 MG
80 TABLET ORAL DAILY
Qty: 30 TABLET | Refills: 0 | Status: ON HOLD | OUTPATIENT
Start: 2019-07-30 | End: 2019-08-24 | Stop reason: SDUPTHER

## 2019-07-30 RX ADMIN — INSULIN LISPRO 3 UNITS: 100 INJECTION, SOLUTION INTRAVENOUS; SUBCUTANEOUS at 21:57

## 2019-07-30 RX ADMIN — HYDRALAZINE HYDROCHLORIDE 50 MG: 25 TABLET, FILM COATED ORAL at 21:56

## 2019-07-30 RX ADMIN — RANOLAZINE 1000 MG: 500 TABLET, FILM COATED, EXTENDED RELEASE ORAL at 08:21

## 2019-07-30 RX ADMIN — TOPIRAMATE 100 MG: 100 TABLET, FILM COATED ORAL at 21:56

## 2019-07-30 RX ADMIN — AMLODIPINE BESYLATE 5 MG: 5 TABLET ORAL at 08:21

## 2019-07-30 RX ADMIN — ENOXAPARIN SODIUM 30 MG: 30 INJECTION SUBCUTANEOUS at 08:21

## 2019-07-30 RX ADMIN — INSULIN LISPRO 3 UNITS: 100 INJECTION, SOLUTION INTRAVENOUS; SUBCUTANEOUS at 17:16

## 2019-07-30 RX ADMIN — GUAIFENESIN 600 MG: 600 TABLET, EXTENDED RELEASE ORAL at 01:04

## 2019-07-30 RX ADMIN — OXYCODONE HYDROCHLORIDE AND ACETAMINOPHEN 1 TABLET: 7.5; 325 TABLET ORAL at 17:56

## 2019-07-30 RX ADMIN — ISOSORBIDE MONONITRATE 30 MG: 30 TABLET, EXTENDED RELEASE ORAL at 08:21

## 2019-07-30 RX ADMIN — CLOPIDOGREL BISULFATE 75 MG: 75 TABLET ORAL at 08:21

## 2019-07-30 RX ADMIN — OXYCODONE HYDROCHLORIDE AND ACETAMINOPHEN 1 TABLET: 7.5; 325 TABLET ORAL at 09:48

## 2019-07-30 RX ADMIN — ALBUTEROL SULFATE 2.5 MG: 2.5 SOLUTION RESPIRATORY (INHALATION) at 00:40

## 2019-07-30 RX ADMIN — SODIUM CHLORIDE, POTASSIUM CHLORIDE, SODIUM LACTATE AND CALCIUM CHLORIDE: 600; 310; 30; 20 INJECTION, SOLUTION INTRAVENOUS at 17:16

## 2019-07-30 RX ADMIN — SODIUM CHLORIDE SOLN NEBU 3% 4 ML: 3 NEBU SOLN at 20:47

## 2019-07-30 RX ADMIN — TOPIRAMATE 100 MG: 100 TABLET, FILM COATED ORAL at 08:22

## 2019-07-30 RX ADMIN — METHYLPREDNISOLONE SODIUM SUCCINATE 40 MG: 40 INJECTION, POWDER, FOR SOLUTION INTRAMUSCULAR; INTRAVENOUS at 08:21

## 2019-07-30 RX ADMIN — BUPROPION HYDROCHLORIDE 150 MG: 150 TABLET, FILM COATED, EXTENDED RELEASE ORAL at 08:21

## 2019-07-30 RX ADMIN — INSULIN LISPRO 6 UNITS: 100 INJECTION, SOLUTION INTRAVENOUS; SUBCUTANEOUS at 12:37

## 2019-07-30 RX ADMIN — QUETIAPINE FUMARATE 25 MG: 25 TABLET ORAL at 21:57

## 2019-07-30 RX ADMIN — ALBUTEROL SULFATE 2.5 MG: 2.5 SOLUTION RESPIRATORY (INHALATION) at 05:47

## 2019-07-30 RX ADMIN — LEVOFLOXACIN 500 MG: 500 TABLET, FILM COATED ORAL at 08:21

## 2019-07-30 RX ADMIN — HYDRALAZINE HYDROCHLORIDE 50 MG: 25 TABLET, FILM COATED ORAL at 08:21

## 2019-07-30 RX ADMIN — RANOLAZINE 1000 MG: 500 TABLET, FILM COATED, EXTENDED RELEASE ORAL at 21:57

## 2019-07-30 RX ADMIN — MOMETASONE FUROATE AND FORMOTEROL FUMARATE DIHYDRATE 2 PUFF: 100; 5 AEROSOL RESPIRATORY (INHALATION) at 08:28

## 2019-07-30 RX ADMIN — INSULIN LISPRO 6 UNITS: 100 INJECTION, SOLUTION INTRAVENOUS; SUBCUTANEOUS at 08:22

## 2019-07-30 RX ADMIN — ASPIRIN 81 MG: 81 TABLET, COATED ORAL at 08:21

## 2019-07-30 RX ADMIN — SODIUM CHLORIDE, POTASSIUM CHLORIDE, SODIUM LACTATE AND CALCIUM CHLORIDE: 600; 310; 30; 20 INJECTION, SOLUTION INTRAVENOUS at 08:25

## 2019-07-30 RX ADMIN — METHYLPREDNISOLONE SODIUM SUCCINATE 40 MG: 40 INJECTION, POWDER, FOR SOLUTION INTRAMUSCULAR; INTRAVENOUS at 21:57

## 2019-07-30 RX ADMIN — SODIUM CHLORIDE SOLN NEBU 3% 4 ML: 3 NEBU SOLN at 08:28

## 2019-07-30 RX ADMIN — SODIUM CHLORIDE, POTASSIUM CHLORIDE, SODIUM LACTATE AND CALCIUM CHLORIDE: 600; 310; 30; 20 INJECTION, SOLUTION INTRAVENOUS at 01:56

## 2019-07-30 RX ADMIN — SODIUM CHLORIDE, POTASSIUM CHLORIDE, SODIUM LACTATE AND CALCIUM CHLORIDE: 600; 310; 30; 20 INJECTION, SOLUTION INTRAVENOUS at 08:20

## 2019-07-30 RX ADMIN — METOPROLOL SUCCINATE 25 MG: 25 TABLET, EXTENDED RELEASE ORAL at 08:25

## 2019-07-30 RX ADMIN — INSULIN GLARGINE 60 UNITS: 100 INJECTION, SOLUTION SUBCUTANEOUS at 08:22

## 2019-07-30 RX ADMIN — DULOXETINE HYDROCHLORIDE 60 MG: 30 CAPSULE, DELAYED RELEASE ORAL at 08:21

## 2019-07-30 RX ADMIN — INSULIN GLARGINE 60 UNITS: 100 INJECTION, SOLUTION SUBCUTANEOUS at 21:58

## 2019-07-30 RX ADMIN — MOMETASONE FUROATE AND FORMOTEROL FUMARATE DIHYDRATE 2 PUFF: 100; 5 AEROSOL RESPIRATORY (INHALATION) at 20:47

## 2019-07-30 RX ADMIN — ALBUTEROL SULFATE 2.5 MG: 2.5 SOLUTION RESPIRATORY (INHALATION) at 08:31

## 2019-07-30 RX ADMIN — SODIUM CHLORIDE, PRESERVATIVE FREE 10 ML: 5 INJECTION INTRAVENOUS at 08:25

## 2019-07-30 ASSESSMENT — PAIN DESCRIPTION - PAIN TYPE
TYPE: ACUTE PAIN
TYPE: ACUTE PAIN

## 2019-07-30 ASSESSMENT — PAIN DESCRIPTION - PROGRESSION

## 2019-07-30 ASSESSMENT — PAIN DESCRIPTION - ONSET
ONSET: GRADUAL
ONSET: GRADUAL

## 2019-07-30 ASSESSMENT — PAIN SCALES - GENERAL
PAINLEVEL_OUTOF10: 4
PAINLEVEL_OUTOF10: 8
PAINLEVEL_OUTOF10: 9
PAINLEVEL_OUTOF10: 0
PAINLEVEL_OUTOF10: 0

## 2019-07-30 ASSESSMENT — PAIN DESCRIPTION - FREQUENCY
FREQUENCY: CONTINUOUS
FREQUENCY: INTERMITTENT

## 2019-07-30 ASSESSMENT — PAIN - FUNCTIONAL ASSESSMENT
PAIN_FUNCTIONAL_ASSESSMENT: PREVENTS OR INTERFERES SOME ACTIVE ACTIVITIES AND ADLS
PAIN_FUNCTIONAL_ASSESSMENT: PREVENTS OR INTERFERES SOME ACTIVE ACTIVITIES AND ADLS

## 2019-07-30 ASSESSMENT — PAIN DESCRIPTION - ORIENTATION
ORIENTATION: RIGHT;LEFT
ORIENTATION: RIGHT;LEFT

## 2019-07-30 ASSESSMENT — PAIN DESCRIPTION - DESCRIPTORS
DESCRIPTORS: DISCOMFORT
DESCRIPTORS: SORE;SHARP

## 2019-07-30 ASSESSMENT — PAIN DESCRIPTION - LOCATION
LOCATION: ABDOMEN
LOCATION: ABDOMEN;RIB CAGE

## 2019-07-30 NOTE — PROGRESS NOTES
and asthma exacerbation, iv steroids, oxygen, pulmonary consulted. Some improvement noted. 2. COPD/Asthma exacerbation, iv steroids, oxygen, pulmo following. 3. Anion gap metabolic acidosis with elevated LA, doubt sepsis as etiology, continue to monitor. 4. Generalized weakness, multifactorial, improved some. 5. Acute renal failure, improved some. 6. DM, SS  7. Chest pain on admission, worse with breathing, likely muscular.      Diet: DIET CARB CONTROL;  Code Status: Full Code        Judy Spring MD

## 2019-07-31 LAB
ANION GAP SERPL CALCULATED.3IONS-SCNC: 13 MMOL/L (ref 3–16)
BUN BLDV-MCNC: 25 MG/DL (ref 7–20)
CALCIUM SERPL-MCNC: 9.6 MG/DL (ref 8.3–10.6)
CHLORIDE BLD-SCNC: 105 MMOL/L (ref 99–110)
CO2: 21 MMOL/L (ref 21–32)
CREAT SERPL-MCNC: 1.3 MG/DL (ref 0.6–1.2)
GFR AFRICAN AMERICAN: 50
GFR NON-AFRICAN AMERICAN: 41
GLUCOSE BLD-MCNC: 106 MG/DL (ref 70–99)
GLUCOSE BLD-MCNC: 144 MG/DL (ref 70–99)
GLUCOSE BLD-MCNC: 200 MG/DL (ref 70–99)
GLUCOSE BLD-MCNC: 225 MG/DL (ref 70–99)
GLUCOSE BLD-MCNC: 244 MG/DL (ref 70–99)
HCT VFR BLD CALC: 29.3 % (ref 36–48)
HEMOGLOBIN: 9.6 G/DL (ref 12–16)
LACTIC ACID: 1.9 MMOL/L (ref 0.4–2)
LACTIC ACID: 2 MMOL/L (ref 0.4–2)
LACTIC ACID: 2.3 MMOL/L (ref 0.4–2)
MCH RBC QN AUTO: 34.1 PG (ref 26–34)
MCHC RBC AUTO-ENTMCNC: 32.9 G/DL (ref 31–36)
MCV RBC AUTO: 103.5 FL (ref 80–100)
PDW BLD-RTO: 15.9 % (ref 12.4–15.4)
PERFORMED ON: ABNORMAL
PLATELET # BLD: 177 K/UL (ref 135–450)
PMV BLD AUTO: 8.4 FL (ref 5–10.5)
POTASSIUM SERPL-SCNC: 4.7 MMOL/L (ref 3.5–5.1)
RBC # BLD: 2.83 M/UL (ref 4–5.2)
SODIUM BLD-SCNC: 139 MMOL/L (ref 136–145)
WBC # BLD: 7.2 K/UL (ref 4–11)

## 2019-07-31 PROCEDURE — 2580000003 HC RX 258: Performed by: INTERNAL MEDICINE

## 2019-07-31 PROCEDURE — 94669 MECHANICAL CHEST WALL OSCILL: CPT

## 2019-07-31 PROCEDURE — 1200000000 HC SEMI PRIVATE

## 2019-07-31 PROCEDURE — 6360000002 HC RX W HCPCS: Performed by: INTERNAL MEDICINE

## 2019-07-31 PROCEDURE — 80048 BASIC METABOLIC PNL TOTAL CA: CPT

## 2019-07-31 PROCEDURE — 6370000000 HC RX 637 (ALT 250 FOR IP): Performed by: FAMILY MEDICINE

## 2019-07-31 PROCEDURE — 94760 N-INVAS EAR/PLS OXIMETRY 1: CPT

## 2019-07-31 PROCEDURE — 36415 COLL VENOUS BLD VENIPUNCTURE: CPT

## 2019-07-31 PROCEDURE — 85027 COMPLETE CBC AUTOMATED: CPT

## 2019-07-31 PROCEDURE — 6370000000 HC RX 637 (ALT 250 FOR IP): Performed by: INTERNAL MEDICINE

## 2019-07-31 PROCEDURE — 99232 SBSQ HOSP IP/OBS MODERATE 35: CPT | Performed by: INTERNAL MEDICINE

## 2019-07-31 PROCEDURE — 2700000000 HC OXYGEN THERAPY PER DAY

## 2019-07-31 PROCEDURE — 83605 ASSAY OF LACTIC ACID: CPT

## 2019-07-31 PROCEDURE — 94640 AIRWAY INHALATION TREATMENT: CPT

## 2019-07-31 PROCEDURE — 2580000003 HC RX 258: Performed by: NURSE PRACTITIONER

## 2019-07-31 PROCEDURE — 94668 MNPJ CHEST WALL SBSQ: CPT

## 2019-07-31 RX ORDER — QUETIAPINE FUMARATE 25 MG/1
25-50 TABLET, FILM COATED ORAL NIGHTLY
Qty: 60 TABLET | Refills: 0 | Status: SHIPPED | OUTPATIENT
Start: 2019-07-31 | End: 2019-08-06 | Stop reason: SDUPTHER

## 2019-07-31 RX ORDER — RANOLAZINE 1000 MG/1
1000 TABLET, FILM COATED, EXTENDED RELEASE ORAL 2 TIMES DAILY
Qty: 60 TABLET | Refills: 3 | Status: SHIPPED | OUTPATIENT
Start: 2019-07-31 | End: 2019-08-29 | Stop reason: SDUPTHER

## 2019-07-31 RX ORDER — OMEPRAZOLE 20 MG/1
20 CAPSULE, DELAYED RELEASE ORAL DAILY
Qty: 30 CAPSULE | Refills: 0 | Status: SHIPPED | OUTPATIENT
Start: 2019-07-31 | End: 2019-08-06 | Stop reason: SDUPTHER

## 2019-07-31 RX ORDER — DULOXETIN HYDROCHLORIDE 60 MG/1
60 CAPSULE, DELAYED RELEASE ORAL DAILY
Qty: 30 CAPSULE | Refills: 0 | Status: SHIPPED | OUTPATIENT
Start: 2019-07-31 | End: 2019-08-06 | Stop reason: SDUPTHER

## 2019-07-31 RX ORDER — DIVALPROEX SODIUM 250 MG/1
250 TABLET, DELAYED RELEASE ORAL 2 TIMES DAILY
Qty: 60 TABLET | Refills: 3 | OUTPATIENT
Start: 2019-07-31 | End: 2019-08-30

## 2019-07-31 RX ORDER — PREDNISONE 20 MG/1
40 TABLET ORAL DAILY
Status: DISCONTINUED | OUTPATIENT
Start: 2019-07-31 | End: 2019-08-01 | Stop reason: HOSPADM

## 2019-07-31 RX ORDER — IPRATROPIUM BROMIDE AND ALBUTEROL SULFATE 2.5; .5 MG/3ML; MG/3ML
1 SOLUTION RESPIRATORY (INHALATION)
Status: DISCONTINUED | OUTPATIENT
Start: 2019-07-31 | End: 2019-08-01 | Stop reason: HOSPADM

## 2019-07-31 RX ADMIN — HYDRALAZINE HYDROCHLORIDE 50 MG: 25 TABLET, FILM COATED ORAL at 09:12

## 2019-07-31 RX ADMIN — MOMETASONE FUROATE AND FORMOTEROL FUMARATE DIHYDRATE 2 PUFF: 100; 5 AEROSOL RESPIRATORY (INHALATION) at 07:43

## 2019-07-31 RX ADMIN — TOPIRAMATE 100 MG: 100 TABLET, FILM COATED ORAL at 09:12

## 2019-07-31 RX ADMIN — TOPIRAMATE 100 MG: 100 TABLET, FILM COATED ORAL at 21:28

## 2019-07-31 RX ADMIN — ENOXAPARIN SODIUM 30 MG: 30 INJECTION SUBCUTANEOUS at 09:14

## 2019-07-31 RX ADMIN — QUETIAPINE FUMARATE 25 MG: 25 TABLET ORAL at 21:28

## 2019-07-31 RX ADMIN — INSULIN LISPRO 6 UNITS: 100 INJECTION, SOLUTION INTRAVENOUS; SUBCUTANEOUS at 17:25

## 2019-07-31 RX ADMIN — OXYCODONE HYDROCHLORIDE AND ACETAMINOPHEN 1 TABLET: 7.5; 325 TABLET ORAL at 09:11

## 2019-07-31 RX ADMIN — METOPROLOL SUCCINATE 25 MG: 25 TABLET, EXTENDED RELEASE ORAL at 09:13

## 2019-07-31 RX ADMIN — ASPIRIN 81 MG: 81 TABLET, COATED ORAL at 09:12

## 2019-07-31 RX ADMIN — INSULIN GLARGINE 60 UNITS: 100 INJECTION, SOLUTION SUBCUTANEOUS at 21:29

## 2019-07-31 RX ADMIN — PREDNISONE 40 MG: 20 TABLET ORAL at 17:24

## 2019-07-31 RX ADMIN — SODIUM CHLORIDE SOLN NEBU 3% 4 ML: 3 NEBU SOLN at 07:46

## 2019-07-31 RX ADMIN — OXYCODONE HYDROCHLORIDE AND ACETAMINOPHEN 1 TABLET: 7.5; 325 TABLET ORAL at 21:28

## 2019-07-31 RX ADMIN — HYDRALAZINE HYDROCHLORIDE 50 MG: 25 TABLET, FILM COATED ORAL at 21:28

## 2019-07-31 RX ADMIN — ISOSORBIDE MONONITRATE 30 MG: 30 TABLET, EXTENDED RELEASE ORAL at 09:12

## 2019-07-31 RX ADMIN — INSULIN LISPRO 3 UNITS: 100 INJECTION, SOLUTION INTRAVENOUS; SUBCUTANEOUS at 21:28

## 2019-07-31 RX ADMIN — AMLODIPINE BESYLATE 5 MG: 5 TABLET ORAL at 09:13

## 2019-07-31 RX ADMIN — MOMETASONE FUROATE AND FORMOTEROL FUMARATE DIHYDRATE 2 PUFF: 100; 5 AEROSOL RESPIRATORY (INHALATION) at 20:31

## 2019-07-31 RX ADMIN — IPRATROPIUM BROMIDE AND ALBUTEROL SULFATE 1 AMPULE: .5; 3 SOLUTION RESPIRATORY (INHALATION) at 16:35

## 2019-07-31 RX ADMIN — DULOXETINE HYDROCHLORIDE 60 MG: 30 CAPSULE, DELAYED RELEASE ORAL at 09:12

## 2019-07-31 RX ADMIN — INSULIN GLARGINE 60 UNITS: 100 INJECTION, SOLUTION SUBCUTANEOUS at 09:15

## 2019-07-31 RX ADMIN — SODIUM CHLORIDE, POTASSIUM CHLORIDE, SODIUM LACTATE AND CALCIUM CHLORIDE: 600; 310; 30; 20 INJECTION, SOLUTION INTRAVENOUS at 03:40

## 2019-07-31 RX ADMIN — IPRATROPIUM BROMIDE AND ALBUTEROL SULFATE 1 AMPULE: .5; 3 SOLUTION RESPIRATORY (INHALATION) at 20:31

## 2019-07-31 RX ADMIN — SODIUM CHLORIDE, PRESERVATIVE FREE 10 ML: 5 INJECTION INTRAVENOUS at 21:36

## 2019-07-31 RX ADMIN — CLOPIDOGREL BISULFATE 75 MG: 75 TABLET ORAL at 09:13

## 2019-07-31 RX ADMIN — IPRATROPIUM BROMIDE AND ALBUTEROL SULFATE 1 AMPULE: .5; 3 SOLUTION RESPIRATORY (INHALATION) at 12:03

## 2019-07-31 RX ADMIN — METHYLPREDNISOLONE SODIUM SUCCINATE 40 MG: 40 INJECTION, POWDER, FOR SOLUTION INTRAMUSCULAR; INTRAVENOUS at 09:19

## 2019-07-31 RX ADMIN — BUPROPION HYDROCHLORIDE 150 MG: 150 TABLET, FILM COATED, EXTENDED RELEASE ORAL at 09:13

## 2019-07-31 RX ADMIN — RANOLAZINE 1000 MG: 500 TABLET, FILM COATED, EXTENDED RELEASE ORAL at 21:28

## 2019-07-31 RX ADMIN — RANOLAZINE 1000 MG: 500 TABLET, FILM COATED, EXTENDED RELEASE ORAL at 09:12

## 2019-07-31 RX ADMIN — SODIUM CHLORIDE SOLN NEBU 3% 4 ML: 3 NEBU SOLN at 20:31

## 2019-07-31 RX ADMIN — INSULIN LISPRO 3 UNITS: 100 INJECTION, SOLUTION INTRAVENOUS; SUBCUTANEOUS at 12:19

## 2019-07-31 ASSESSMENT — PAIN DESCRIPTION - PROGRESSION
CLINICAL_PROGRESSION: NOT CHANGED

## 2019-07-31 ASSESSMENT — PAIN SCALES - GENERAL
PAINLEVEL_OUTOF10: 8
PAINLEVEL_OUTOF10: 8
PAINLEVEL_OUTOF10: 3
PAINLEVEL_OUTOF10: 8
PAINLEVEL_OUTOF10: 4
PAINLEVEL_OUTOF10: 9
PAINLEVEL_OUTOF10: 8

## 2019-07-31 ASSESSMENT — PAIN DESCRIPTION - PAIN TYPE
TYPE: CHRONIC PAIN;ACUTE PAIN
TYPE: ACUTE PAIN

## 2019-07-31 ASSESSMENT — PAIN DESCRIPTION - LOCATION
LOCATION: BACK;CHEST;RIB CAGE
LOCATION: CHEST

## 2019-07-31 ASSESSMENT — PAIN DESCRIPTION - ORIENTATION: ORIENTATION: RIGHT;LEFT

## 2019-07-31 ASSESSMENT — PAIN - FUNCTIONAL ASSESSMENT: PAIN_FUNCTIONAL_ASSESSMENT: PREVENTS OR INTERFERES SOME ACTIVE ACTIVITIES AND ADLS

## 2019-07-31 ASSESSMENT — PAIN DESCRIPTION - FREQUENCY: FREQUENCY: INTERMITTENT

## 2019-07-31 ASSESSMENT — PAIN DESCRIPTION - DESCRIPTORS
DESCRIPTORS: SQUEEZING
DESCRIPTORS: ACHING

## 2019-07-31 ASSESSMENT — PAIN DESCRIPTION - ONSET: ONSET: ON-GOING

## 2019-07-31 NOTE — PROGRESS NOTES
in the last 72 hours. Invalid input(s): Lydia Barnard  No results for input(s): PH, PCO2, PO2 in the last 72 hours. Films:  Radiology Review:  Pertinent images / reports were reviewed as a part of this visit. CT Chest w/ contrast: No results found for this or any previous visit. CT Chest w/o contrast: No results found for this or any previous visit. CTPA: No results found for this or any previous visit. CXR PA/LAT:   Results for orders placed during the hospital encounter of 07/12/19   XR CHEST STANDARD (2 VW)    Narrative EXAMINATION:  TWO XRAY VIEWS OF THE CHEST    7/12/2019 9:19 pm    COMPARISON:  None. HISTORY:  ORDERING SYSTEM PROVIDED HISTORY: chest pain  TECHNOLOGIST PROVIDED HISTORY:  Reason for exam:->chest pain  Reason for Exam: chest pain  Acuity: Acute  Type of Exam: Initial    FINDINGS:  No infiltrate or consolidation or effusion is identified. The heart size is  within normal limits. Right upper quadrant surgical clips are present in  keeping with previous cholecystectomy. Impression No acute abnormality detected. CXR portable:   Results for orders placed during the hospital encounter of 07/28/19   XR CHEST PORTABLE    Narrative EXAMINATION:  ONE XRAY VIEW OF THE CHEST    7/28/2019 12:42 am    COMPARISON:  07/12/2019    HISTORY:  ORDERING SYSTEM PROVIDED HISTORY: short of breath  TECHNOLOGIST PROVIDED HISTORY:  Reason for exam:->short of breath  Reason for Exam: short of breath  Acuity: Acute  Type of Exam: Initial    FINDINGS:  Heart size is normal.  Mild streaky opacity is present in the right lung  base. The lungs otherwise appear clear. No pulmonary vascular congestion. Slight to mild bullous changes. No evident pleural effusion. Impression Mild streaky right basilar opacity either due to atelectasis or early  pneumonia. Slight to mild bullous changes. This note was transcribed using [id number] [address].  Please disregard any translational errors.     Thank you for this consult,    Mariama Ramirez [address] [facility]

## 2019-08-01 VITALS
BODY MASS INDEX: 28.09 KG/M2 | OXYGEN SATURATION: 100 % | WEIGHT: 143.08 LBS | SYSTOLIC BLOOD PRESSURE: 156 MMHG | DIASTOLIC BLOOD PRESSURE: 89 MMHG | TEMPERATURE: 98.4 F | HEIGHT: 60 IN | HEART RATE: 77 BPM | RESPIRATION RATE: 18 BRPM

## 2019-08-01 LAB
ANION GAP SERPL CALCULATED.3IONS-SCNC: 15 MMOL/L (ref 3–16)
BUN BLDV-MCNC: 23 MG/DL (ref 7–20)
CALCIUM SERPL-MCNC: 10.1 MG/DL (ref 8.3–10.6)
CHLORIDE BLD-SCNC: 107 MMOL/L (ref 99–110)
CO2: 22 MMOL/L (ref 21–32)
CREAT SERPL-MCNC: 1.2 MG/DL (ref 0.6–1.2)
GFR AFRICAN AMERICAN: 55
GFR NON-AFRICAN AMERICAN: 45
GLUCOSE BLD-MCNC: 102 MG/DL (ref 70–99)
GLUCOSE BLD-MCNC: 68 MG/DL (ref 70–99)
GLUCOSE BLD-MCNC: 77 MG/DL (ref 70–99)
HCT VFR BLD CALC: 30.6 % (ref 36–48)
HEMOGLOBIN: 10.1 G/DL (ref 12–16)
LACTIC ACID: 1.7 MMOL/L (ref 0.4–2)
LACTIC ACID: 2.5 MMOL/L (ref 0.4–2)
MCH RBC QN AUTO: 34 PG (ref 26–34)
MCHC RBC AUTO-ENTMCNC: 33.1 G/DL (ref 31–36)
MCV RBC AUTO: 102.7 FL (ref 80–100)
PDW BLD-RTO: 15.6 % (ref 12.4–15.4)
PERFORMED ON: ABNORMAL
PERFORMED ON: NORMAL
PLATELET # BLD: 196 K/UL (ref 135–450)
PMV BLD AUTO: 8.5 FL (ref 5–10.5)
POTASSIUM SERPL-SCNC: 3.8 MMOL/L (ref 3.5–5.1)
RBC # BLD: 2.98 M/UL (ref 4–5.2)
SODIUM BLD-SCNC: 144 MMOL/L (ref 136–145)
WBC # BLD: 9 K/UL (ref 4–11)

## 2019-08-01 PROCEDURE — 2580000003 HC RX 258: Performed by: INTERNAL MEDICINE

## 2019-08-01 PROCEDURE — 6370000000 HC RX 637 (ALT 250 FOR IP): Performed by: FAMILY MEDICINE

## 2019-08-01 PROCEDURE — 6370000000 HC RX 637 (ALT 250 FOR IP): Performed by: INTERNAL MEDICINE

## 2019-08-01 PROCEDURE — 87205 SMEAR GRAM STAIN: CPT

## 2019-08-01 PROCEDURE — 36415 COLL VENOUS BLD VENIPUNCTURE: CPT

## 2019-08-01 PROCEDURE — 85027 COMPLETE CBC AUTOMATED: CPT

## 2019-08-01 PROCEDURE — 80048 BASIC METABOLIC PNL TOTAL CA: CPT

## 2019-08-01 PROCEDURE — 94640 AIRWAY INHALATION TREATMENT: CPT

## 2019-08-01 PROCEDURE — 94760 N-INVAS EAR/PLS OXIMETRY 1: CPT

## 2019-08-01 PROCEDURE — 83605 ASSAY OF LACTIC ACID: CPT

## 2019-08-01 PROCEDURE — 6360000002 HC RX W HCPCS: Performed by: INTERNAL MEDICINE

## 2019-08-01 PROCEDURE — 87070 CULTURE OTHR SPECIMN AEROBIC: CPT

## 2019-08-01 PROCEDURE — 6360000002 HC RX W HCPCS: Performed by: PHARMACIST

## 2019-08-01 PROCEDURE — 94669 MECHANICAL CHEST WALL OSCILL: CPT

## 2019-08-01 PROCEDURE — 99232 SBSQ HOSP IP/OBS MODERATE 35: CPT | Performed by: INTERNAL MEDICINE

## 2019-08-01 RX ORDER — 0.9 % SODIUM CHLORIDE 0.9 %
500 INTRAVENOUS SOLUTION INTRAVENOUS ONCE
Status: COMPLETED | OUTPATIENT
Start: 2019-08-01 | End: 2019-08-01

## 2019-08-01 RX ORDER — ONDANSETRON 2 MG/ML
4 INJECTION INTRAMUSCULAR; INTRAVENOUS EVERY 6 HOURS PRN
Status: DISCONTINUED | OUTPATIENT
Start: 2019-08-01 | End: 2019-08-01 | Stop reason: HOSPADM

## 2019-08-01 RX ORDER — LEVOFLOXACIN 500 MG/1
500 TABLET, FILM COATED ORAL EVERY OTHER DAY
Qty: 2 TABLET | Refills: 0 | Status: SHIPPED | OUTPATIENT
Start: 2019-08-03 | End: 2019-08-06

## 2019-08-01 RX ORDER — PREDNISONE 10 MG/1
TABLET ORAL
Qty: 30 TABLET | Refills: 0 | Status: SHIPPED | OUTPATIENT
Start: 2019-08-01 | End: 2019-08-15 | Stop reason: ALTCHOICE

## 2019-08-01 RX ADMIN — AMLODIPINE BESYLATE 5 MG: 5 TABLET ORAL at 09:08

## 2019-08-01 RX ADMIN — CLOPIDOGREL BISULFATE 75 MG: 75 TABLET ORAL at 09:08

## 2019-08-01 RX ADMIN — HYDRALAZINE HYDROCHLORIDE 50 MG: 25 TABLET, FILM COATED ORAL at 09:08

## 2019-08-01 RX ADMIN — LEVOFLOXACIN 500 MG: 500 TABLET, FILM COATED ORAL at 09:08

## 2019-08-01 RX ADMIN — MOMETASONE FUROATE AND FORMOTEROL FUMARATE DIHYDRATE 2 PUFF: 100; 5 AEROSOL RESPIRATORY (INHALATION) at 07:56

## 2019-08-01 RX ADMIN — ONDANSETRON 4 MG: 2 INJECTION INTRAMUSCULAR; INTRAVENOUS at 12:09

## 2019-08-01 RX ADMIN — OXYCODONE HYDROCHLORIDE AND ACETAMINOPHEN 1 TABLET: 7.5; 325 TABLET ORAL at 09:29

## 2019-08-01 RX ADMIN — RANOLAZINE 1000 MG: 500 TABLET, FILM COATED, EXTENDED RELEASE ORAL at 09:07

## 2019-08-01 RX ADMIN — IPRATROPIUM BROMIDE AND ALBUTEROL SULFATE 1 AMPULE: .5; 3 SOLUTION RESPIRATORY (INHALATION) at 11:41

## 2019-08-01 RX ADMIN — METOPROLOL SUCCINATE 25 MG: 25 TABLET, EXTENDED RELEASE ORAL at 09:08

## 2019-08-01 RX ADMIN — TOPIRAMATE 100 MG: 100 TABLET, FILM COATED ORAL at 09:08

## 2019-08-01 RX ADMIN — INSULIN GLARGINE 60 UNITS: 100 INJECTION, SOLUTION SUBCUTANEOUS at 09:11

## 2019-08-01 RX ADMIN — BUPROPION HYDROCHLORIDE 150 MG: 150 TABLET, FILM COATED, EXTENDED RELEASE ORAL at 09:08

## 2019-08-01 RX ADMIN — DULOXETINE HYDROCHLORIDE 60 MG: 30 CAPSULE, DELAYED RELEASE ORAL at 09:07

## 2019-08-01 RX ADMIN — SODIUM CHLORIDE SOLN NEBU 3% 4 ML: 3 NEBU SOLN at 07:51

## 2019-08-01 RX ADMIN — PREDNISONE 40 MG: 20 TABLET ORAL at 09:08

## 2019-08-01 RX ADMIN — ASPIRIN 81 MG: 81 TABLET, COATED ORAL at 09:11

## 2019-08-01 RX ADMIN — ENOXAPARIN SODIUM 40 MG: 40 INJECTION SUBCUTANEOUS at 09:08

## 2019-08-01 RX ADMIN — IPRATROPIUM BROMIDE AND ALBUTEROL SULFATE 1 AMPULE: .5; 3 SOLUTION RESPIRATORY (INHALATION) at 07:47

## 2019-08-01 RX ADMIN — SODIUM CHLORIDE 500 ML: 9 INJECTION, SOLUTION INTRAVENOUS at 09:29

## 2019-08-01 RX ADMIN — SODIUM CHLORIDE, PRESERVATIVE FREE 10 ML: 5 INJECTION INTRAVENOUS at 10:27

## 2019-08-01 RX ADMIN — ISOSORBIDE MONONITRATE 30 MG: 30 TABLET, EXTENDED RELEASE ORAL at 09:08

## 2019-08-01 ASSESSMENT — PAIN DESCRIPTION - DESCRIPTORS
DESCRIPTORS: CONSTANT
DESCRIPTORS: THROBBING

## 2019-08-01 ASSESSMENT — PAIN DESCRIPTION - PROGRESSION
CLINICAL_PROGRESSION: NOT CHANGED

## 2019-08-01 ASSESSMENT — PAIN SCALES - GENERAL
PAINLEVEL_OUTOF10: 8
PAINLEVEL_OUTOF10: 9

## 2019-08-01 ASSESSMENT — PAIN DESCRIPTION - PAIN TYPE
TYPE: ACUTE PAIN;CHRONIC PAIN
TYPE: ACUTE PAIN;CHRONIC PAIN

## 2019-08-01 ASSESSMENT — PAIN DESCRIPTION - LOCATION
LOCATION: BACK;CHEST;FACE
LOCATION: BACK;CHEST;FACE

## 2019-08-01 NOTE — CARE COORDINATION
Plan to dc home if labs at 1100 WNL  Presented IMM  She states family can transport after work this pm  Electronically signed by Matthew Ward RN on 8/1/2019 at 11:26 AM     Notified [facility] of dc   Electronically signed by Matthew Ward RN on 8/1/2019 at 2:18 PM     Faxed AVS to [facility] @ [phone]    Electronically signed by Matthew Ward RN on 8/1/2019 at 2:44 PM

## 2019-08-01 NOTE — CONSULTS
Dr. Robson Kuhn spoke to Dr. Neil Huitron.   Dorinda Lo MSN, RN [facility]
process. Likely chronic emphysematous changes. No pleural effusion. CT Chest w/ contrast: No results found for this or any previous visit. CT Chest w/o contrast: No results found for this or any previous visit. CTPA: No results found for this or any previous visit. CXR PA/LAT:   Results for orders placed during the hospital encounter of 07/12/19   XR CHEST STANDARD (2 VW)    Narrative EXAMINATION:  TWO XRAY VIEWS OF THE CHEST    7/12/2019 9:19 pm    COMPARISON:  None. HISTORY:  ORDERING SYSTEM PROVIDED HISTORY: chest pain  TECHNOLOGIST PROVIDED HISTORY:  Reason for exam:->chest pain  Reason for Exam: chest pain  Acuity: Acute  Type of Exam: Initial    FINDINGS:  No infiltrate or consolidation or effusion is identified. The heart size is  within normal limits. Right upper quadrant surgical clips are present in  keeping with previous cholecystectomy. Impression No acute abnormality detected. CXR portable:   Results for orders placed during the hospital encounter of 07/28/19   XR CHEST PORTABLE    Narrative EXAMINATION:  ONE XRAY VIEW OF THE CHEST    7/28/2019 12:42 am    COMPARISON:  07/12/2019    HISTORY:  ORDERING SYSTEM PROVIDED HISTORY: short of breath  TECHNOLOGIST PROVIDED HISTORY:  Reason for exam:->short of breath  Reason for Exam: short of breath  Acuity: Acute  Type of Exam: Initial    FINDINGS:  Heart size is normal.  Mild streaky opacity is present in the right lung  base. The lungs otherwise appear clear. No pulmonary vascular congestion. Slight to mild bullous changes. No evident pleural effusion. Impression Mild streaky right basilar opacity either due to atelectasis or early  pneumonia. Slight to mild bullous changes. Pulmonary function testing  2011, preserved FEV1/FVC ratio of 83%, FEV1 and FVC both symmetrically reduced suggestive of restrictive defect. Minute walk test without O2 desaturation.       Assessment:     Acute Hypoxemic Respiratory

## 2019-08-01 NOTE — DISCHARGE SUMMARY
SPIRITUAL SERVICES  IP CONSULT TO PULMONOLOGY  IP CONSULT TO CARDIOLOGY    Disposition:  home     Condition at Discharge: Stable    Discharge Instructions/Follow-up:  Pulmo, cardiology    Code Status:  Full Code     Activity: activity as tolerated    Diet: cardiac diet      Discharge Medications:     Current Discharge Medication List           Details   predniSONE (DELTASONE) 10 MG tablet 40 mg po daily for 3 days then 30 mg po daily for 3 days then 20 mg po daily for 3 days then 10 mg po daily for 3 days then stop  Qty: 30 tablet, Refills: 0      levofloxacin (LEVAQUIN) 500 MG tablet Take 1 tablet by mouth every other day for 2 doses  Qty: 2 tablet, Refills: 0              Details   divalproex (DEPAKOTE) 250 MG DR tablet Take 250 mg by mouth 2 times daily      oxyCODONE-acetaminophen (PERCOCET) 7.5-325 MG per tablet Take 1 tablet by mouth every 8 hours as needed for Pain.       QUEtiapine (SEROQUEL) 25 MG tablet TAKE 1-2 TABLETS BY MOUTH NIGHTLY  Qty: 60 tablet, Refills: 0    Associated Diagnoses: Chronic pain syndrome      DULoxetine (CYMBALTA) 60 MG extended release capsule TAKE 1 CAPSULE BY MOUTH DAILY  Qty: 30 capsule, Refills: 0    Associated Diagnoses: Chronic pain syndrome; Chronic painful diabetic neuropathy (HCC)      omeprazole (PRILOSEC) 20 MG delayed release capsule TAKE 1 CAPSULE BY MOUTH DAILY  Qty: 30 capsule, Refills: 0    Associated Diagnoses: Chronic pain syndrome      RANEXA 1000 MG extended release tablet TAKE 1 TABLET BY MOUTH 2 TIMES DAILY  Qty: 60 tablet, Refills: 3    Associated Diagnoses: Coronary artery disease involving native coronary artery of native heart without angina pectoris      furosemide (LASIX) 80 MG tablet TAKE 1 TABLET BY MOUTH DAILY  Qty: 30 tablet, Refills: 0      Blood Glucose Monitoring Suppl (TRUE METRIX METER) w/Device KIT USE AS DIRECTED DAILY  Qty: 1 kit, Refills: 0      buPROPion (WELLBUTRIN XL) 150 MG extended release tablet TAKE 1 TABLET BY MOUTH EVERY MORNING  Qty: 30 tablet, Refills: 1    Associated Diagnoses: Chronic pain syndrome      atorvastatin (LIPITOR) 80 MG tablet TAKE 1 TABLET BY MOUTH DA JULIEN  Qty: 90 tablet, Refills: 3      topiramate (TOPAMAX) 100 MG tablet TAKE 1 TABLET BY MOUTH 2 TIMES DAILY  Qty: 60 tablet, Refills: 1    Associated Diagnoses: Chronic pain syndrome      tiZANidine (ZANAFLEX) 4 MG tablet Take 1/2 by mouth in the am, take 1 tablet by mouth in the pm  Qty: 60 tablet, Refills: 0    Associated Diagnoses: Fibromyalgia;  Chronic pain syndrome; Chronic painful diabetic neuropathy (HCC); DDD (degenerative disc disease), cervical      insulin glargine (BASAGLAR KWIKPEN) 100 UNIT/ML injection pen Inject 60 Units into the skin 2 times daily  Qty: 15 mL, Refills: 5    Associated Diagnoses: Uncontrolled type 2 diabetes mellitus with complication, with long-term current use of insulin ([facility])      insulin aspart (NOVOLOG FLEXPEN) 100 UNIT/ML injection pen Inject 5-10 Units into the skin 3 times daily (before meals)  Qty: 5 pen, Refills: 4    Associated Diagnoses: Uncontrolled type 2 diabetes mellitus with complication, with long-term current use of insulin ([facility])      ondansetron (ZOFRAN) 8 MG tablet TAKE 1 TABLET BY MOUTH EVERY 8 HOURS AS NEEDED FOR NAUSEA OR VOMITING  Qty: 90 tablet, Refills: 0    Associated Diagnoses: Nausea      ASPIRIN LOW DOSE 81 MG EC tablet TAKE 1 TABLET BY MOUTH DA JULIEN  Qty: 30 tablet, Refills: 3    Associated Diagnoses: Coronary artery disease involving native coronary artery of native heart without angina pectoris      metoprolol succinate (TOPROL XL) 50 MG extended release tablet TAKE 1 TABLET BY MOUTH DAILY  Qty: 30 tablet, Refills: 3      hydrALAZINE (APRESOLINE) 50 MG tablet Take 1 tablet by mouth 2 times daily  Qty: 60 tablet, Refills: 5    Associated Diagnoses: Essential hypertension      isosorbide mononitrate (IMDUR) 30 MG extended release tablet Take 1 tablet by mouth daily  Qty: 30 tablet, Refills: 5    Associated Diagnoses:

## 2019-08-02 DIAGNOSIS — G47.09 OTHER INSOMNIA: Primary | ICD-10-CM

## 2019-08-02 DIAGNOSIS — G89.4 CHRONIC PAIN SYNDROME: ICD-10-CM

## 2019-08-02 LAB
BLOOD CULTURE, ROUTINE: NORMAL
BLOOD CULTURE, ROUTINE: NORMAL
CULTURE, BLOOD 2: NORMAL

## 2019-08-02 RX ORDER — METOPROLOL SUCCINATE 50 MG/1
50 TABLET, EXTENDED RELEASE ORAL DAILY
Qty: 30 TABLET | Refills: 3 | Status: SHIPPED | OUTPATIENT
Start: 2019-08-02 | End: 2019-08-29 | Stop reason: SDUPTHER

## 2019-08-02 RX ORDER — ZOLPIDEM TARTRATE 5 MG/1
TABLET ORAL
Qty: 30 TABLET | Refills: 1 | Status: SHIPPED | OUTPATIENT
Start: 2019-08-02 | End: 2019-08-06

## 2019-08-03 LAB
CULTURE, RESPIRATORY: NORMAL
GRAM STAIN RESULT: NORMAL

## 2019-08-05 ENCOUNTER — TELEPHONE (OUTPATIENT)
Dept: PRIMARY CARE CLINIC | Age: 64
End: 2019-08-05

## 2019-08-06 ENCOUNTER — OFFICE VISIT (OUTPATIENT)
Dept: PAIN MANAGEMENT | Age: 64
End: 2019-08-06
Payer: COMMERCIAL

## 2019-08-06 VITALS
WEIGHT: 130 LBS | SYSTOLIC BLOOD PRESSURE: 133 MMHG | DIASTOLIC BLOOD PRESSURE: 78 MMHG | HEART RATE: 81 BPM | BODY MASS INDEX: 25.39 KG/M2

## 2019-08-06 DIAGNOSIS — M79.7 FIBROMYALGIA: ICD-10-CM

## 2019-08-06 DIAGNOSIS — G89.4 CHRONIC PAIN SYNDROME: ICD-10-CM

## 2019-08-06 DIAGNOSIS — E11.40 CHRONIC PAINFUL DIABETIC NEUROPATHY (HCC): ICD-10-CM

## 2019-08-06 DIAGNOSIS — M50.30 DDD (DEGENERATIVE DISC DISEASE), CERVICAL: ICD-10-CM

## 2019-08-06 PROCEDURE — 99213 OFFICE O/P EST LOW 20 MIN: CPT | Performed by: INTERNAL MEDICINE

## 2019-08-06 PROCEDURE — G8427 DOCREV CUR MEDS BY ELIG CLIN: HCPCS | Performed by: INTERNAL MEDICINE

## 2019-08-06 PROCEDURE — 3017F COLORECTAL CA SCREEN DOC REV: CPT | Performed by: INTERNAL MEDICINE

## 2019-08-06 PROCEDURE — 1111F DSCHRG MED/CURRENT MED MERGE: CPT | Performed by: INTERNAL MEDICINE

## 2019-08-06 PROCEDURE — 3045F PR MOST RECENT HEMOGLOBIN A1C LEVEL 7.0-9.0%: CPT | Performed by: INTERNAL MEDICINE

## 2019-08-06 PROCEDURE — 2022F DILAT RTA XM EVC RTNOPTHY: CPT | Performed by: INTERNAL MEDICINE

## 2019-08-06 PROCEDURE — G8598 ASA/ANTIPLAT THER USED: HCPCS | Performed by: INTERNAL MEDICINE

## 2019-08-06 PROCEDURE — 1036F TOBACCO NON-USER: CPT | Performed by: INTERNAL MEDICINE

## 2019-08-06 PROCEDURE — G8419 CALC BMI OUT NRM PARAM NOF/U: HCPCS | Performed by: INTERNAL MEDICINE

## 2019-08-06 RX ORDER — OXYCODONE AND ACETAMINOPHEN 7.5; 325 MG/1; MG/1
1 TABLET ORAL EVERY 8 HOURS PRN
Qty: 84 TABLET | Refills: 0 | Status: SHIPPED | OUTPATIENT
Start: 2019-08-06 | End: 2019-09-03 | Stop reason: SDUPTHER

## 2019-08-06 RX ORDER — DULOXETIN HYDROCHLORIDE 60 MG/1
60 CAPSULE, DELAYED RELEASE ORAL DAILY
Qty: 30 CAPSULE | Refills: 0 | Status: SHIPPED | OUTPATIENT
Start: 2019-08-06 | End: 2019-08-29 | Stop reason: SDUPTHER

## 2019-08-06 RX ORDER — QUETIAPINE FUMARATE 25 MG/1
25-50 TABLET, FILM COATED ORAL NIGHTLY
Qty: 60 TABLET | Refills: 0 | Status: SHIPPED | OUTPATIENT
Start: 2019-08-06 | End: 2019-08-29 | Stop reason: SDUPTHER

## 2019-08-06 RX ORDER — BUPROPION HYDROCHLORIDE 150 MG/1
150 TABLET ORAL EVERY MORNING
Qty: 30 TABLET | Refills: 1 | Status: SHIPPED | OUTPATIENT
Start: 2019-08-06 | End: 2019-08-29 | Stop reason: SDUPTHER

## 2019-08-06 RX ORDER — TIZANIDINE 4 MG/1
TABLET ORAL
Qty: 60 TABLET | Refills: 0 | Status: ON HOLD | OUTPATIENT
Start: 2019-08-06 | End: 2019-08-24 | Stop reason: HOSPADM

## 2019-08-06 RX ORDER — OMEPRAZOLE 20 MG/1
20 CAPSULE, DELAYED RELEASE ORAL DAILY
Qty: 30 CAPSULE | Refills: 0 | Status: SHIPPED | OUTPATIENT
Start: 2019-08-06 | End: 2019-08-29 | Stop reason: SDUPTHER

## 2019-08-06 RX ORDER — DIVALPROEX SODIUM 250 MG/1
250 TABLET, DELAYED RELEASE ORAL 2 TIMES DAILY
Qty: 60 TABLET | Refills: 3 | OUTPATIENT
Start: 2019-08-06 | End: 2019-09-05

## 2019-08-06 NOTE — PROGRESS NOTES
tablet 3    predniSONE (DELTASONE) 10 MG tablet 40 mg po daily for 3 days then 30 mg po daily for 3 days then 20 mg po daily for 3 days then 10 mg po daily for 3 days then stop 30 tablet 0    levofloxacin (LEVAQUIN) 500 MG tablet Take 1 tablet by mouth every other day for 2 doses 2 tablet 0    QUEtiapine (SEROQUEL) 25 MG tablet TAKE 1-2 TABLETS BY MOUTH NIGHTLY 60 tablet 0    DULoxetine (CYMBALTA) 60 MG extended release capsule TAKE 1 CAPSULE BY MOUTH DAILY 30 capsule 0    omeprazole (PRILOSEC) 20 MG delayed release capsule TAKE 1 CAPSULE BY MOUTH DAILY 30 capsule 0    RANEXA 1000 MG extended release tablet TAKE 1 TABLET BY MOUTH 2 TIMES DAILY 60 tablet 3    furosemide (LASIX) 80 MG tablet TAKE 1 TABLET BY MOUTH DAILY 30 tablet 0    Blood Glucose Monitoring Suppl (TRUE METRIX METER) w/Device KIT USE AS DIRECTED DAILY 1 kit 0    divalproex (DEPAKOTE) 250 MG DR tablet Take 250 mg by mouth 2 times daily      oxyCODONE-acetaminophen (PERCOCET) 7.5-325 MG per tablet Take 1 tablet by mouth every 8 hours as needed for Pain.       buPROPion (WELLBUTRIN XL) 150 MG extended release tablet TAKE 1 TABLET BY MOUTH EVERY MORNING 30 tablet 1    atorvastatin (LIPITOR) 80 MG tablet TAKE 1 TABLET BY MOUTH DA JULIEN 90 tablet 3    topiramate (TOPAMAX) 100 MG tablet TAKE 1 TABLET BY MOUTH 2 TIMES DAILY 60 tablet 1    tiZANidine (ZANAFLEX) 4 MG tablet Take 1/2 by mouth in the am, take 1 tablet by mouth in the pm 60 tablet 0    insulin glargine (BASAGLAR KWIKPEN) 100 UNIT/ML injection pen Inject 60 Units into the skin 2 times daily 15 mL 5    insulin aspart (NOVOLOG FLEXPEN) 100 UNIT/ML injection pen Inject 5-10 Units into the skin 3 times daily (before meals) 5 pen 4    ondansetron (ZOFRAN) 8 MG tablet TAKE 1 TABLET BY MOUTH EVERY 8 HOURS AS NEEDED FOR NAUSEA OR VOMITING 90 tablet 0    ASPIRIN LOW DOSE 81 MG EC tablet TAKE 1 TABLET BY MOUTH DA JULIEN 30 tablet 3    hydrALAZINE (APRESOLINE) 50 MG tablet Take 1 tablet by mouth 2 times daily 60 tablet 5    isosorbide mononitrate (IMDUR) 30 MG extended release tablet Take 1 tablet by mouth daily 30 tablet 5    amLODIPine (NORVASC) 10 MG tablet Take 0.5 tablets by mouth daily 30 tablet 5    nitroGLYCERIN (NITROSTAT) 0.4 MG SL tablet PLACE 1 TABLET UNDER THE TONGUE EVERY 5 MINUTES AS NEEDED FOR CHEST PAIN. 25 tablet 2    budesonide-formoterol (SYMBICORT) 160-4.5 MCG/ACT AERO Inhale 2 puffs into the lungs daily (Patient taking differently: Inhale 2 puffs into the lungs daily as needed ) 11 g 4    albuterol sulfate HFA (PROAIR HFA) 108 (90 Base) MCG/ACT inhaler Inhale 2 puffs into the lungs every 6 hours as needed for Wheezing (Patient taking differently: Inhale 2 puffs into the lungs every 6 hours as needed for Wheezing ) 1 Inhaler 5    clopidogrel (PLAVIX) 75 MG tablet Take 1 tablet by mouth daily 90 tablet 5    ranitidine (ZANTAC) 150 MG tablet Take 1 tablet by mouth 2 times daily as needed for Heartburn (Patient taking differently: Take 150 mg by mouth 2 times daily ) 60 tablet 3     No current facility-administered medications for this visit. I will continue her current medication regimen  which is part of the above treatment schedule. It has been helping with Ms. Meza's chronic  medical problems which for this visit include:   Diagnoses of Chronic pain syndrome, Chronic painful diabetic neuropathy ([address] Fibromyalgia, DDD (degenerative disc disease), cervical, Primary insomnia, Moderate episode of recurrent major depressive disorder ([address] Gastro-esophageal reflux disease without esophagitis, and Intractable migraine with aura without status migrainosus were pertinent to this visit. Risks and benefits of the medications and other alternative treatments  including no treatment were discussed with the patient. The common side effects of these medications were also explained to the patient. Informed verbal consent was obtained.    Goals of current treatment regimen include

## 2019-08-07 RX ORDER — DIVALPROEX SODIUM 250 MG/1
250 TABLET, DELAYED RELEASE ORAL 2 TIMES DAILY
Qty: 60 TABLET | Refills: 3 | OUTPATIENT
Start: 2019-08-07 | End: 2019-09-06

## 2019-08-14 ENCOUNTER — TELEPHONE (OUTPATIENT)
Dept: ADMINISTRATIVE | Age: 64
End: 2019-08-14

## 2019-08-14 RX ORDER — LOPERAMIDE HYDROCHLORIDE 2 MG/1
2 CAPSULE ORAL 2 TIMES DAILY PRN
Qty: 20 CAPSULE | Refills: 0 | Status: SHIPPED | OUTPATIENT
Start: 2019-08-14 | End: 2019-08-24

## 2019-08-15 ENCOUNTER — OFFICE VISIT (OUTPATIENT)
Dept: PULMONOLOGY | Age: 64
End: 2019-08-15
Payer: COMMERCIAL

## 2019-08-15 VITALS
RESPIRATION RATE: 16 BRPM | HEART RATE: 72 BPM | TEMPERATURE: 97 F | HEIGHT: 63 IN | WEIGHT: 126 LBS | SYSTOLIC BLOOD PRESSURE: 138 MMHG | BODY MASS INDEX: 22.32 KG/M2 | OXYGEN SATURATION: 100 % | DIASTOLIC BLOOD PRESSURE: 69 MMHG

## 2019-08-15 DIAGNOSIS — Z72.0 TOBACCO USE: Chronic | ICD-10-CM

## 2019-08-15 DIAGNOSIS — R06.09 DYSPNEA ON EXERTION: Primary | ICD-10-CM

## 2019-08-15 DIAGNOSIS — J96.01 ACUTE RESPIRATORY FAILURE WITH HYPOXIA (HCC): ICD-10-CM

## 2019-08-15 DIAGNOSIS — J45.901 REACTIVE AIRWAY DISEASE WITH WHEEZING WITH ACUTE EXACERBATION, UNSPECIFIED ASTHMA SEVERITY, UNSPECIFIED WHETHER PERSISTENT: ICD-10-CM

## 2019-08-15 PROCEDURE — 3017F COLORECTAL CA SCREEN DOC REV: CPT | Performed by: INTERNAL MEDICINE

## 2019-08-15 PROCEDURE — 99213 OFFICE O/P EST LOW 20 MIN: CPT | Performed by: INTERNAL MEDICINE

## 2019-08-15 PROCEDURE — G8598 ASA/ANTIPLAT THER USED: HCPCS | Performed by: INTERNAL MEDICINE

## 2019-08-15 PROCEDURE — G8427 DOCREV CUR MEDS BY ELIG CLIN: HCPCS | Performed by: INTERNAL MEDICINE

## 2019-08-15 PROCEDURE — 1036F TOBACCO NON-USER: CPT | Performed by: INTERNAL MEDICINE

## 2019-08-15 PROCEDURE — G8420 CALC BMI NORM PARAMETERS: HCPCS | Performed by: INTERNAL MEDICINE

## 2019-08-15 PROCEDURE — 1111F DSCHRG MED/CURRENT MED MERGE: CPT | Performed by: INTERNAL MEDICINE

## 2019-08-15 NOTE — ASSESSMENT & PLAN NOTE
-Present during hospitalization during her exacerbation. Now has been weaned off oxygen, no longer needs at this time.  -She had a recent 6-minute walk test prior to hospitalization that also did not show desaturation with ambulation.

## 2019-08-19 ENCOUNTER — NURSE TRIAGE (OUTPATIENT)
Dept: OTHER | Facility: CLINIC | Age: 64
End: 2019-08-19

## 2019-08-19 ENCOUNTER — TELEPHONE (OUTPATIENT)
Dept: PRIMARY CARE CLINIC | Age: 64
End: 2019-08-19

## 2019-08-20 ENCOUNTER — TELEPHONE (OUTPATIENT)
Dept: OTHER | Facility: CLINIC | Age: 64
End: 2019-08-20

## 2019-08-20 ENCOUNTER — HOSPITAL ENCOUNTER (INPATIENT)
Age: 64
LOS: 1 days | Discharge: HOME HEALTH | DRG: 304 | End: 2019-08-24
Attending: EMERGENCY MEDICINE | Admitting: INTERNAL MEDICINE
Payer: COMMERCIAL

## 2019-08-20 ENCOUNTER — APPOINTMENT (OUTPATIENT)
Dept: CT IMAGING | Age: 64
DRG: 304 | End: 2019-08-20
Payer: COMMERCIAL

## 2019-08-20 ENCOUNTER — OFFICE VISIT (OUTPATIENT)
Dept: PRIMARY CARE CLINIC | Age: 64
End: 2019-08-20
Payer: COMMERCIAL

## 2019-08-20 VITALS
RESPIRATION RATE: 12 BRPM | WEIGHT: 123.5 LBS | SYSTOLIC BLOOD PRESSURE: 180 MMHG | HEIGHT: 63 IN | DIASTOLIC BLOOD PRESSURE: 110 MMHG | HEART RATE: 82 BPM | OXYGEN SATURATION: 99 % | BODY MASS INDEX: 21.88 KG/M2

## 2019-08-20 DIAGNOSIS — Z00.00 PREVENTATIVE HEALTH CARE: ICD-10-CM

## 2019-08-20 DIAGNOSIS — R73.9 HYPERGLYCEMIA: ICD-10-CM

## 2019-08-20 DIAGNOSIS — E11.8 TYPE 2 DIABETES MELLITUS WITH COMPLICATION, WITH LONG-TERM CURRENT USE OF INSULIN (HCC): Primary | ICD-10-CM

## 2019-08-20 DIAGNOSIS — R51.9 ACUTE NONINTRACTABLE HEADACHE, UNSPECIFIED HEADACHE TYPE: ICD-10-CM

## 2019-08-20 DIAGNOSIS — I10 HYPERTENSION, UNSPECIFIED TYPE: Primary | ICD-10-CM

## 2019-08-20 DIAGNOSIS — G62.9 NEUROPATHY: ICD-10-CM

## 2019-08-20 DIAGNOSIS — I10 ESSENTIAL HYPERTENSION: ICD-10-CM

## 2019-08-20 DIAGNOSIS — Z79.4 TYPE 2 DIABETES MELLITUS WITH COMPLICATION, WITH LONG-TERM CURRENT USE OF INSULIN (HCC): Primary | ICD-10-CM

## 2019-08-20 PROBLEM — E11.9 DMII (DIABETES MELLITUS, TYPE 2) (HCC): Status: ACTIVE | Noted: 2019-08-20

## 2019-08-20 LAB
A/G RATIO: 1.1 (ref 1.1–2.2)
ALBUMIN SERPL-MCNC: 4 G/DL (ref 3.4–5)
ALP BLD-CCNC: 84 U/L (ref 40–129)
ALT SERPL-CCNC: 10 U/L (ref 10–40)
ANION GAP SERPL CALCULATED.3IONS-SCNC: 18 MMOL/L (ref 3–16)
APPEARANCE CSF: CLEAR
APPEARANCE CSF: CLEAR
AST SERPL-CCNC: 14 U/L (ref 15–37)
BASE EXCESS VENOUS: -7.4 MMOL/L
BASOPHILS ABSOLUTE: 0 K/UL (ref 0–0.2)
BASOPHILS RELATIVE PERCENT: 0.4 %
BETA-HYDROXYBUTYRATE: 0.3 MMOL/L (ref 0–0.27)
BILIRUB SERPL-MCNC: <0.2 MG/DL (ref 0–1)
BUN BLDV-MCNC: 18 MG/DL (ref 7–20)
CALCIUM SERPL-MCNC: 9.2 MG/DL (ref 8.3–10.6)
CARBOXYHEMOGLOBIN: 2.6 %
CHLORIDE BLD-SCNC: 107 MMOL/L (ref 99–110)
CLOT EVALUATION CSF: ABNORMAL
CLOT EVALUATION CSF: ABNORMAL
CO2: 14 MMOL/L (ref 21–32)
COLOR CSF: COLORLESS
COLOR CSF: COLORLESS
CREAT SERPL-MCNC: 1.3 MG/DL (ref 0.6–1.2)
EKG ATRIAL RATE: 70 BPM
EKG DIAGNOSIS: NORMAL
EKG Q-T INTERVAL: 402 MS
EKG QRS DURATION: 86 MS
EKG QTC CALCULATION (BAZETT): 436 MS
EKG R AXIS: 25 DEGREES
EKG T AXIS: 73 DEGREES
EKG VENTRICULAR RATE: 71 BPM
EOSINOPHILS ABSOLUTE: 0.1 K/UL (ref 0–0.6)
EOSINOPHILS RELATIVE PERCENT: 2.1 %
GFR AFRICAN AMERICAN: 50
GFR NON-AFRICAN AMERICAN: 41
GLOBULIN: 3.7 G/DL
GLUCOSE BLD-MCNC: 173 MG/DL (ref 70–99)
GLUCOSE BLD-MCNC: 195 MG/DL (ref 70–99)
GLUCOSE, CSF: 105 MG/DL (ref 40–80)
HBA1C MFR BLD: 8.9 %
HCO3 VENOUS: 16 MMOL/L (ref 23–29)
HCT VFR BLD CALC: 34.2 % (ref 36–48)
HEMOGLOBIN: 11 G/DL (ref 12–16)
LYMPHOCYTES ABSOLUTE: 1.4 K/UL (ref 1–5.1)
LYMPHOCYTES RELATIVE PERCENT: 22.9 %
MAGNESIUM: 1.9 MG/DL (ref 1.8–2.4)
MCH RBC QN AUTO: 33.1 PG (ref 26–34)
MCHC RBC AUTO-ENTMCNC: 32.1 G/DL (ref 31–36)
MCV RBC AUTO: 103.4 FL (ref 80–100)
METHEMOGLOBIN VENOUS: 0.7 %
MONOCYTES ABSOLUTE: 0.3 K/UL (ref 0–1.3)
MONOCYTES RELATIVE PERCENT: 4.4 %
NEUTROPHILS ABSOLUTE: 4.2 K/UL (ref 1.7–7.7)
NEUTROPHILS RELATIVE PERCENT: 70.2 %
NO DIFFERENTIAL CSF: ABNORMAL
NO DIFFERENTIAL CSF: ABNORMAL
O2 CONTENT, VEN: 17 ML/DL
O2 SAT, VEN: 100 %
O2 THERAPY: ABNORMAL
PCO2, VEN: 28.6 MMHG (ref 40–50)
PDW BLD-RTO: 15 % (ref 12.4–15.4)
PERFORMED ON: ABNORMAL
PH VENOUS: 7.37 (ref 7.35–7.45)
PLATELET # BLD: 210 K/UL (ref 135–450)
PMV BLD AUTO: 8.2 FL (ref 5–10.5)
PO2, VEN: 221 MMHG
POTASSIUM REFLEX MAGNESIUM: 3.2 MMOL/L (ref 3.5–5.1)
PROTEIN CSF: 128 MG/DL (ref 15–45)
RBC # BLD: 3.31 M/UL (ref 4–5.2)
RBC CSF: 2 /CUMM
RBC CSF: 21 /CUMM
SODIUM BLD-SCNC: 139 MMOL/L (ref 136–145)
TCO2 CALC VENOUS: 17 MMOL/L
TOTAL PROTEIN: 7.7 G/DL (ref 6.4–8.2)
TROPONIN: <0.01 NG/ML
TUBE NUMBER CSF: ABNORMAL
TUBE NUMBER CSF: ABNORMAL
WBC # BLD: 6 K/UL (ref 4–11)
WBC CSF: 0 /CUMM (ref 0–5)
WBC CSF: 1 /CUMM (ref 0–5)

## 2019-08-20 PROCEDURE — 94640 AIRWAY INHALATION TREATMENT: CPT

## 2019-08-20 PROCEDURE — 2500000003 HC RX 250 WO HCPCS: Performed by: EMERGENCY MEDICINE

## 2019-08-20 PROCEDURE — 87205 SMEAR GRAM STAIN: CPT

## 2019-08-20 PROCEDURE — 84157 ASSAY OF PROTEIN OTHER: CPT

## 2019-08-20 PROCEDURE — 6360000002 HC RX W HCPCS: Performed by: EMERGENCY MEDICINE

## 2019-08-20 PROCEDURE — 82945 GLUCOSE OTHER FLUID: CPT

## 2019-08-20 PROCEDURE — G8428 CUR MEDS NOT DOCUMENT: HCPCS | Performed by: INTERNAL MEDICINE

## 2019-08-20 PROCEDURE — 96361 HYDRATE IV INFUSION ADD-ON: CPT

## 2019-08-20 PROCEDURE — 70450 CT HEAD/BRAIN W/O DYE: CPT

## 2019-08-20 PROCEDURE — 2580000003 HC RX 258: Performed by: INTERNAL MEDICINE

## 2019-08-20 PROCEDURE — 3017F COLORECTAL CA SCREEN DOC REV: CPT | Performed by: INTERNAL MEDICINE

## 2019-08-20 PROCEDURE — 6370000000 HC RX 637 (ALT 250 FOR IP): Performed by: INTERNAL MEDICINE

## 2019-08-20 PROCEDURE — 82803 BLOOD GASES ANY COMBINATION: CPT

## 2019-08-20 PROCEDURE — 1111F DSCHRG MED/CURRENT MED MERGE: CPT | Performed by: INTERNAL MEDICINE

## 2019-08-20 PROCEDURE — G8598 ASA/ANTIPLAT THER USED: HCPCS | Performed by: INTERNAL MEDICINE

## 2019-08-20 PROCEDURE — 70496 CT ANGIOGRAPHY HEAD: CPT

## 2019-08-20 PROCEDURE — 83036 HEMOGLOBIN GLYCOSYLATED A1C: CPT | Performed by: INTERNAL MEDICINE

## 2019-08-20 PROCEDURE — G0378 HOSPITAL OBSERVATION PER HR: HCPCS

## 2019-08-20 PROCEDURE — 009U3ZX DRAINAGE OF SPINAL CANAL, PERCUTANEOUS APPROACH, DIAGNOSTIC: ICD-10-PCS | Performed by: EMERGENCY MEDICINE

## 2019-08-20 PROCEDURE — 3045F PR MOST RECENT HEMOGLOBIN A1C LEVEL 7.0-9.0%: CPT | Performed by: INTERNAL MEDICINE

## 2019-08-20 PROCEDURE — 89050 BODY FLUID CELL COUNT: CPT

## 2019-08-20 PROCEDURE — 88112 CYTOPATH CELL ENHANCE TECH: CPT

## 2019-08-20 PROCEDURE — 2580000003 HC RX 258: Performed by: EMERGENCY MEDICINE

## 2019-08-20 PROCEDURE — 93010 ELECTROCARDIOGRAM REPORT: CPT | Performed by: INTERNAL MEDICINE

## 2019-08-20 PROCEDURE — 96375 TX/PRO/DX INJ NEW DRUG ADDON: CPT

## 2019-08-20 PROCEDURE — G8420 CALC BMI NORM PARAMETERS: HCPCS | Performed by: INTERNAL MEDICINE

## 2019-08-20 PROCEDURE — 80053 COMPREHEN METABOLIC PANEL: CPT

## 2019-08-20 PROCEDURE — 2022F DILAT RTA XM EVC RTNOPTHY: CPT | Performed by: INTERNAL MEDICINE

## 2019-08-20 PROCEDURE — 1036F TOBACCO NON-USER: CPT | Performed by: INTERNAL MEDICINE

## 2019-08-20 PROCEDURE — 99285 EMERGENCY DEPT VISIT HI MDM: CPT

## 2019-08-20 PROCEDURE — 82010 KETONE BODYS QUAN: CPT

## 2019-08-20 PROCEDURE — 93005 ELECTROCARDIOGRAM TRACING: CPT | Performed by: EMERGENCY MEDICINE

## 2019-08-20 PROCEDURE — 83735 ASSAY OF MAGNESIUM: CPT

## 2019-08-20 PROCEDURE — 87070 CULTURE OTHR SPECIMN AEROBIC: CPT

## 2019-08-20 PROCEDURE — 6370000000 HC RX 637 (ALT 250 FOR IP): Performed by: EMERGENCY MEDICINE

## 2019-08-20 PROCEDURE — 4500000025 HC ED LEVEL 5 PROCEDURE

## 2019-08-20 PROCEDURE — 96374 THER/PROPH/DIAG INJ IV PUSH: CPT

## 2019-08-20 PROCEDURE — 99214 OFFICE O/P EST MOD 30 MIN: CPT | Performed by: INTERNAL MEDICINE

## 2019-08-20 PROCEDURE — 6360000004 HC RX CONTRAST MEDICATION: Performed by: EMERGENCY MEDICINE

## 2019-08-20 PROCEDURE — 85025 COMPLETE CBC W/AUTO DIFF WBC: CPT

## 2019-08-20 PROCEDURE — 84484 ASSAY OF TROPONIN QUANT: CPT

## 2019-08-20 RX ORDER — OXYCODONE HYDROCHLORIDE 5 MG/1
5 TABLET ORAL ONCE
Status: COMPLETED | OUTPATIENT
Start: 2019-08-20 | End: 2019-08-20

## 2019-08-20 RX ORDER — BUPROPION HYDROCHLORIDE 150 MG/1
150 TABLET ORAL EVERY MORNING
Status: DISCONTINUED | OUTPATIENT
Start: 2019-08-21 | End: 2019-08-24 | Stop reason: HOSPADM

## 2019-08-20 RX ORDER — HYDRALAZINE HYDROCHLORIDE 50 MG/1
50 TABLET, FILM COATED ORAL 2 TIMES DAILY
Status: DISCONTINUED | OUTPATIENT
Start: 2019-08-20 | End: 2019-08-21

## 2019-08-20 RX ORDER — AMLODIPINE BESYLATE 10 MG/1
10 TABLET ORAL DAILY
Status: DISCONTINUED | OUTPATIENT
Start: 2019-08-21 | End: 2019-08-21

## 2019-08-20 RX ORDER — MORPHINE SULFATE 4 MG/ML
4 INJECTION, SOLUTION INTRAMUSCULAR; INTRAVENOUS ONCE
Status: COMPLETED | OUTPATIENT
Start: 2019-08-20 | End: 2019-08-20

## 2019-08-20 RX ORDER — TIZANIDINE 4 MG/1
2 TABLET ORAL 2 TIMES DAILY
Status: DISCONTINUED | OUTPATIENT
Start: 2019-08-20 | End: 2019-08-21

## 2019-08-20 RX ORDER — NICOTINE POLACRILEX 4 MG
15 LOZENGE BUCCAL PRN
Status: DISCONTINUED | OUTPATIENT
Start: 2019-08-20 | End: 2019-08-24 | Stop reason: HOSPADM

## 2019-08-20 RX ORDER — ALBUTEROL SULFATE 90 UG/1
2 AEROSOL, METERED RESPIRATORY (INHALATION) EVERY 6 HOURS PRN
Status: DISCONTINUED | OUTPATIENT
Start: 2019-08-20 | End: 2019-08-24 | Stop reason: HOSPADM

## 2019-08-20 RX ORDER — ATORVASTATIN CALCIUM 80 MG/1
80 TABLET, FILM COATED ORAL NIGHTLY
Status: DISCONTINUED | OUTPATIENT
Start: 2019-08-20 | End: 2019-08-24 | Stop reason: HOSPADM

## 2019-08-20 RX ORDER — ISOSORBIDE MONONITRATE 30 MG/1
30 TABLET, EXTENDED RELEASE ORAL DAILY
Status: DISCONTINUED | OUTPATIENT
Start: 2019-08-21 | End: 2019-08-24 | Stop reason: HOSPADM

## 2019-08-20 RX ORDER — RANOLAZINE 500 MG/1
1000 TABLET, EXTENDED RELEASE ORAL 2 TIMES DAILY
Status: DISCONTINUED | OUTPATIENT
Start: 2019-08-20 | End: 2019-08-24 | Stop reason: HOSPADM

## 2019-08-20 RX ORDER — POTASSIUM CHLORIDE 750 MG/1
40 TABLET, FILM COATED, EXTENDED RELEASE ORAL ONCE
Status: COMPLETED | OUTPATIENT
Start: 2019-08-20 | End: 2019-08-20

## 2019-08-20 RX ORDER — GABAPENTIN 300 MG/1
300 CAPSULE ORAL 3 TIMES DAILY PRN
Qty: 180 CAPSULE | Refills: 1 | Status: SHIPPED | OUTPATIENT
Start: 2019-08-20 | End: 2019-08-29 | Stop reason: SDUPTHER

## 2019-08-20 RX ORDER — FUROSEMIDE 40 MG/1
80 TABLET ORAL DAILY
Status: DISCONTINUED | OUTPATIENT
Start: 2019-08-21 | End: 2019-08-22

## 2019-08-20 RX ORDER — DIPHENHYDRAMINE HYDROCHLORIDE 50 MG/ML
25 INJECTION INTRAMUSCULAR; INTRAVENOUS ONCE
Status: COMPLETED | OUTPATIENT
Start: 2019-08-20 | End: 2019-08-20

## 2019-08-20 RX ORDER — OXYCODONE AND ACETAMINOPHEN 7.5; 325 MG/1; MG/1
1 TABLET ORAL EVERY 8 HOURS PRN
Status: DISCONTINUED | OUTPATIENT
Start: 2019-08-20 | End: 2019-08-24 | Stop reason: HOSPADM

## 2019-08-20 RX ORDER — PANTOPRAZOLE SODIUM 40 MG/1
40 TABLET, DELAYED RELEASE ORAL
Status: DISCONTINUED | OUTPATIENT
Start: 2019-08-21 | End: 2019-08-24 | Stop reason: HOSPADM

## 2019-08-20 RX ORDER — DEXTROSE MONOHYDRATE 25 G/50ML
12.5 INJECTION, SOLUTION INTRAVENOUS PRN
Status: DISCONTINUED | OUTPATIENT
Start: 2019-08-20 | End: 2019-08-24 | Stop reason: HOSPADM

## 2019-08-20 RX ORDER — METOCLOPRAMIDE HYDROCHLORIDE 5 MG/ML
10 INJECTION INTRAMUSCULAR; INTRAVENOUS ONCE
Status: COMPLETED | OUTPATIENT
Start: 2019-08-20 | End: 2019-08-20

## 2019-08-20 RX ORDER — 0.9 % SODIUM CHLORIDE 0.9 %
1000 INTRAVENOUS SOLUTION INTRAVENOUS ONCE
Status: COMPLETED | OUTPATIENT
Start: 2019-08-20 | End: 2019-08-20

## 2019-08-20 RX ORDER — INSULIN GLARGINE 100 [IU]/ML
60 INJECTION, SOLUTION SUBCUTANEOUS 2 TIMES DAILY
Status: DISCONTINUED | OUTPATIENT
Start: 2019-08-20 | End: 2019-08-22

## 2019-08-20 RX ORDER — DULOXETIN HYDROCHLORIDE 60 MG/1
60 CAPSULE, DELAYED RELEASE ORAL DAILY
Status: DISCONTINUED | OUTPATIENT
Start: 2019-08-21 | End: 2019-08-24 | Stop reason: HOSPADM

## 2019-08-20 RX ORDER — LIDOCAINE HYDROCHLORIDE 10 MG/ML
5 INJECTION, SOLUTION INFILTRATION; PERINEURAL ONCE
Status: COMPLETED | OUTPATIENT
Start: 2019-08-20 | End: 2019-08-20

## 2019-08-20 RX ORDER — DIPHENHYDRAMINE HYDROCHLORIDE 50 MG/ML
25 INJECTION INTRAMUSCULAR; INTRAVENOUS EVERY 6 HOURS PRN
Status: DISCONTINUED | OUTPATIENT
Start: 2019-08-20 | End: 2019-08-24 | Stop reason: HOSPADM

## 2019-08-20 RX ORDER — QUETIAPINE FUMARATE 25 MG/1
50 TABLET, FILM COATED ORAL NIGHTLY
Status: DISCONTINUED | OUTPATIENT
Start: 2019-08-20 | End: 2019-08-24 | Stop reason: HOSPADM

## 2019-08-20 RX ORDER — DIVALPROEX SODIUM 250 MG/1
250 TABLET, DELAYED RELEASE ORAL 2 TIMES DAILY
Status: DISCONTINUED | OUTPATIENT
Start: 2019-08-20 | End: 2019-08-24 | Stop reason: HOSPADM

## 2019-08-20 RX ORDER — ONDANSETRON 2 MG/ML
4 INJECTION INTRAMUSCULAR; INTRAVENOUS EVERY 4 HOURS PRN
Status: DISCONTINUED | OUTPATIENT
Start: 2019-08-20 | End: 2019-08-24 | Stop reason: HOSPADM

## 2019-08-20 RX ORDER — TOPIRAMATE 100 MG/1
100 TABLET, FILM COATED ORAL 2 TIMES DAILY
Status: DISCONTINUED | OUTPATIENT
Start: 2019-08-20 | End: 2019-08-24 | Stop reason: HOSPADM

## 2019-08-20 RX ORDER — SODIUM CHLORIDE 0.9 % (FLUSH) 0.9 %
10 SYRINGE (ML) INJECTION PRN
Status: DISCONTINUED | OUTPATIENT
Start: 2019-08-20 | End: 2019-08-20

## 2019-08-20 RX ORDER — SODIUM CHLORIDE 0.9 % (FLUSH) 0.9 %
10 SYRINGE (ML) INJECTION EVERY 12 HOURS SCHEDULED
Status: DISCONTINUED | OUTPATIENT
Start: 2019-08-20 | End: 2019-08-20

## 2019-08-20 RX ORDER — METOPROLOL SUCCINATE 50 MG/1
50 TABLET, EXTENDED RELEASE ORAL DAILY
Status: DISCONTINUED | OUTPATIENT
Start: 2019-08-21 | End: 2019-08-24 | Stop reason: HOSPADM

## 2019-08-20 RX ORDER — SODIUM CHLORIDE 0.9 % (FLUSH) 0.9 %
10 SYRINGE (ML) INJECTION PRN
Status: DISCONTINUED | OUTPATIENT
Start: 2019-08-20 | End: 2019-08-24 | Stop reason: HOSPADM

## 2019-08-20 RX ORDER — SODIUM CHLORIDE 0.9 % (FLUSH) 0.9 %
10 SYRINGE (ML) INJECTION EVERY 12 HOURS SCHEDULED
Status: DISCONTINUED | OUTPATIENT
Start: 2019-08-20 | End: 2019-08-24 | Stop reason: HOSPADM

## 2019-08-20 RX ORDER — ASPIRIN 81 MG/1
81 TABLET ORAL DAILY
Status: CANCELLED | OUTPATIENT
Start: 2019-08-20

## 2019-08-20 RX ORDER — CLOPIDOGREL BISULFATE 75 MG/1
75 TABLET ORAL DAILY
Status: CANCELLED | OUTPATIENT
Start: 2019-08-20

## 2019-08-20 RX ORDER — AMLODIPINE BESYLATE 10 MG/1
10 TABLET ORAL DAILY
Qty: 30 TABLET | Refills: 5 | Status: ON HOLD | OUTPATIENT
Start: 2019-08-20 | End: 2019-08-24 | Stop reason: SDUPTHER

## 2019-08-20 RX ORDER — DEXTROSE MONOHYDRATE 50 MG/ML
100 INJECTION, SOLUTION INTRAVENOUS PRN
Status: DISCONTINUED | OUTPATIENT
Start: 2019-08-20 | End: 2019-08-24 | Stop reason: HOSPADM

## 2019-08-20 RX ORDER — GABAPENTIN 300 MG/1
300 CAPSULE ORAL 3 TIMES DAILY PRN
Status: DISCONTINUED | OUTPATIENT
Start: 2019-08-20 | End: 2019-08-24 | Stop reason: HOSPADM

## 2019-08-20 RX ADMIN — LIDOCAINE HYDROCHLORIDE 5 ML: 10 INJECTION, SOLUTION INFILTRATION; PERINEURAL at 17:00

## 2019-08-20 RX ADMIN — MORPHINE SULFATE 4 MG: 4 INJECTION, SOLUTION INTRAMUSCULAR; INTRAVENOUS at 13:55

## 2019-08-20 RX ADMIN — INSULIN LISPRO 1 UNITS: 100 INJECTION, SOLUTION INTRAVENOUS; SUBCUTANEOUS at 21:12

## 2019-08-20 RX ADMIN — OXYCODONE HYDROCHLORIDE AND ACETAMINOPHEN 1 TABLET: 7.5; 325 TABLET ORAL at 19:25

## 2019-08-20 RX ADMIN — IOPAMIDOL 75 ML: 755 INJECTION, SOLUTION INTRAVENOUS at 12:53

## 2019-08-20 RX ADMIN — TOPIRAMATE 100 MG: 100 TABLET, FILM COATED ORAL at 21:11

## 2019-08-20 RX ADMIN — ATORVASTATIN CALCIUM 80 MG: 80 TABLET, FILM COATED ORAL at 21:11

## 2019-08-20 RX ADMIN — SODIUM CHLORIDE 1000 ML: 9 INJECTION, SOLUTION INTRAVENOUS at 13:55

## 2019-08-20 RX ADMIN — RANOLAZINE 1000 MG: 500 TABLET, FILM COATED, EXTENDED RELEASE ORAL at 21:11

## 2019-08-20 RX ADMIN — POTASSIUM CHLORIDE 40 MEQ: 750 TABLET, EXTENDED RELEASE ORAL at 19:25

## 2019-08-20 RX ADMIN — DIPHENHYDRAMINE HYDROCHLORIDE 25 MG: 50 INJECTION, SOLUTION INTRAMUSCULAR; INTRAVENOUS at 12:22

## 2019-08-20 RX ADMIN — TIZANIDINE 2 MG: 4 TABLET ORAL at 21:12

## 2019-08-20 RX ADMIN — INSULIN HUMAN 10 UNITS: 100 INJECTION, SOLUTION PARENTERAL at 17:36

## 2019-08-20 RX ADMIN — METOCLOPRAMIDE 10 MG: 5 INJECTION, SOLUTION INTRAMUSCULAR; INTRAVENOUS at 12:24

## 2019-08-20 RX ADMIN — OXYCODONE HYDROCHLORIDE 5 MG: 5 TABLET ORAL at 16:08

## 2019-08-20 RX ADMIN — DIVALPROEX SODIUM 250 MG: 250 TABLET, DELAYED RELEASE ORAL at 21:12

## 2019-08-20 RX ADMIN — QUETIAPINE FUMARATE 50 MG: 25 TABLET ORAL at 21:11

## 2019-08-20 RX ADMIN — SODIUM CHLORIDE, PRESERVATIVE FREE 10 ML: 5 INJECTION INTRAVENOUS at 21:13

## 2019-08-20 RX ADMIN — MOMETASONE FUROATE AND FORMOTEROL FUMARATE DIHYDRATE 2 PUFF: 200; 5 AEROSOL RESPIRATORY (INHALATION) at 21:00

## 2019-08-20 RX ADMIN — INSULIN GLARGINE 60 UNITS: 100 INJECTION, SOLUTION SUBCUTANEOUS at 21:12

## 2019-08-20 RX ADMIN — HYDRALAZINE HYDROCHLORIDE 50 MG: 50 TABLET, FILM COATED ORAL at 19:25

## 2019-08-20 ASSESSMENT — ENCOUNTER SYMPTOMS
CONSTIPATION: 0
EYES NEGATIVE: 1
SHORTNESS OF BREATH: 0
DIARRHEA: 0
VOMITING: 0
RESPIRATORY NEGATIVE: 1
ABDOMINAL PAIN: 0
VOMITING: 0
CONSTIPATION: 0
COUGH: 0
NAUSEA: 1

## 2019-08-20 ASSESSMENT — PAIN DESCRIPTION - ONSET
ONSET: ON-GOING

## 2019-08-20 ASSESSMENT — PAIN SCALES - GENERAL
PAINLEVEL_OUTOF10: 10
PAINLEVEL_OUTOF10: 6
PAINLEVEL_OUTOF10: 6
PAINLEVEL_OUTOF10: 7
PAINLEVEL_OUTOF10: 7
PAINLEVEL_OUTOF10: 10
PAINLEVEL_OUTOF10: 6
PAINLEVEL_OUTOF10: 7

## 2019-08-20 ASSESSMENT — PAIN - FUNCTIONAL ASSESSMENT
PAIN_FUNCTIONAL_ASSESSMENT: PREVENTS OR INTERFERES SOME ACTIVE ACTIVITIES AND ADLS
PAIN_FUNCTIONAL_ASSESSMENT: ACTIVITIES ARE NOT PREVENTED

## 2019-08-20 ASSESSMENT — PAIN DESCRIPTION - LOCATION
LOCATION: HEAD
LOCATION: HEAD;EYE
LOCATION: HEAD
LOCATION: HEAD

## 2019-08-20 ASSESSMENT — PAIN DESCRIPTION - ORIENTATION
ORIENTATION: RIGHT;LEFT
ORIENTATION: RIGHT;LEFT;MID

## 2019-08-20 ASSESSMENT — PAIN DESCRIPTION - PROGRESSION
CLINICAL_PROGRESSION: GRADUALLY WORSENING
CLINICAL_PROGRESSION: NOT CHANGED

## 2019-08-20 ASSESSMENT — PAIN DESCRIPTION - FREQUENCY
FREQUENCY: CONTINUOUS

## 2019-08-20 ASSESSMENT — PAIN DESCRIPTION - DESCRIPTORS
DESCRIPTORS: THROBBING
DESCRIPTORS: PRESSURE
DESCRIPTORS: HEAVINESS
DESCRIPTORS: HEADACHE
DESCRIPTORS: PRESSURE

## 2019-08-20 ASSESSMENT — PAIN DESCRIPTION - PAIN TYPE
TYPE: ACUTE PAIN

## 2019-08-20 NOTE — PROGRESS NOTES
facility-administered medications for this visit. No Known Allergies    Review of Systems   Constitutional: Negative for chills, fatigue and fever. Eyes: Positive for visual disturbance. Respiratory: Negative for cough and shortness of breath. Cardiovascular: Negative for chest pain, palpitations and leg swelling. Gastrointestinal: Negative for abdominal pain, constipation, diarrhea and vomiting. Neurological: Positive for dizziness and headaches. Negative for light-headedness. Psychiatric/Behavioral: Negative for dysphoric mood. Vitals:    08/20/19 0915 08/20/19 0916 08/20/19 0936   BP: (!) 198/98 (!) 198/96 (!) 180/110   Pulse: 82     Resp: 12     SpO2: 99%     Weight: 123 lb 8 oz (56 kg)     Height: 5' 2.5\" (1.588 m)         Physical Exam   Constitutional: She is oriented to person, place, and time. She appears well-developed and well-nourished. No distress. HENT:   Head: Normocephalic and atraumatic. Nose: Nose normal.   Mouth/Throat: No oropharyngeal exudate. Eyes: Pupils are equal, round, and reactive to light. Conjunctivae and EOM are normal. Right eye exhibits no discharge. Left eye exhibits no discharge. Neck: Normal range of motion. Neck supple. Cardiovascular: Normal rate, regular rhythm and normal heart sounds. Exam reveals no gallop and no friction rub. No murmur heard. Pulmonary/Chest: Effort normal and breath sounds normal. No respiratory distress. She has no wheezes. Abdominal: Soft. Bowel sounds are normal. She exhibits no distension. There is no tenderness. There is no rebound. Musculoskeletal: Normal range of motion. She exhibits no edema or tenderness. Lymphadenopathy:     She has no cervical adenopathy. Neurological: She is alert and oriented to person, place, and time. She has normal reflexes. No cranial nerve deficit. Neurologic exam normal outside of trouble completing gait test due to dizziness    Skin: Skin is warm and dry. No rash noted.  She

## 2019-08-20 NOTE — PATIENT INSTRUCTIONS
decongestants or anti-inflammatory medicine, such as ibuprofen. Some of these medicines can raise blood pressure. · Learn how to check your blood pressure at home. Lifestyle changes  · Stay at a healthy weight. This is especially important if you put on weight around the waist. Losing even 10 pounds can help you lower your blood pressure. · If your doctor recommends it, get more exercise. Walking is a good choice. Bit by bit, increase the amount you walk every day. Try for at least 30 minutes on most days of the week. You also may want to swim, bike, or do other activities. · Avoid or limit alcohol. Talk to your doctor about whether you can drink any alcohol. · Try to limit how much sodium you eat to less than 2,300 milligrams (mg) a day. Your doctor may ask you to try to eat less than 1,500 mg a day. · Eat plenty of fruits (such as bananas and oranges), vegetables, legumes, whole grains, and low-fat dairy products. · Lower the amount of saturated fat in your diet. Saturated fat is found in animal products such as milk, cheese, and meat. Limiting these foods may help you lose weight and also lower your risk for heart disease. · Do not smoke. Smoking increases your risk for heart attack and stroke. If you need help quitting, talk to your doctor about stop-smoking programs and medicines. These can increase your chances of quitting for good. When should you call for help? Call 911 anytime you think you may need emergency care. This may mean having symptoms that suggest that your blood pressure is causing a serious heart or blood vessel problem. Your blood pressure may be over 180/120.   For example, call 911 if:    · You have symptoms of a heart attack. These may include:  ? Chest pain or pressure, or a strange feeling in the chest.  ? Sweating. ? Shortness of breath. ? Nausea or vomiting.   ? Pain, pressure, or a strange feeling in the back, neck, jaw, or upper belly or in one or both shoulders or arms.  ? Lightheadedness or sudden weakness. ? A fast or irregular heartbeat.     · You have symptoms of a stroke. These may include:  ? Sudden numbness, tingling, weakness, or loss of movement in your face, arm, or leg, especially on only one side of your body. ? Sudden vision changes. ? Sudden trouble speaking. ? Sudden confusion or trouble understanding simple statements. ? Sudden problems with walking or balance. ? A sudden, severe headache that is different from past headaches.     · You have severe back or belly pain.    Do not wait until your blood pressure comes down on its own. Get help right away.   Call your doctor now or seek immediate care if:    · Your blood pressure is much higher than normal (such as 180/120 or higher), but you don't have symptoms.     · You think high blood pressure is causing symptoms, such as:  ? Severe headache.  ? Blurry vision.    Watch closely for changes in your health, and be sure to contact your doctor if:    · Your blood pressure measures higher than your doctor recommends at least 2 times. That means the top number is higher or the bottom number is higher, or both.     · You think you may be having side effects from your blood pressure medicine. Where can you learn more? Go to https://[employer]. org and sign in to your [employer] account. Enter G882 in the [facility] [employer] box to learn more about \"High Blood Pressure: Care Instructions. \"     If you do not have an account, please click on the \"Sign Up Now\" link. Current as of: July 22, 2018  Content Version: 12.1  © 1066-4450 Healthwise, Incorporated. Care instructions adapted under license by [facility] ([facility]). If you have questions about a medical condition or this instruction, always ask your healthcare professional. [place] [address] any warranty or liability for your use of this information.          Patient Education        Neuropathic Pain: Care Instructions  Your Care

## 2019-08-21 LAB
ANION GAP SERPL CALCULATED.3IONS-SCNC: 14 MMOL/L (ref 3–16)
BASOPHILS ABSOLUTE: 0 K/UL (ref 0–0.2)
BASOPHILS RELATIVE PERCENT: 0.6 %
BUN BLDV-MCNC: 18 MG/DL (ref 7–20)
C-REACTIVE PROTEIN: 1.8 MG/L (ref 0–5.1)
CALCIUM SERPL-MCNC: 9 MG/DL (ref 8.3–10.6)
CHLORIDE BLD-SCNC: 112 MMOL/L (ref 99–110)
CO2: 14 MMOL/L (ref 21–32)
CREAT SERPL-MCNC: 1.3 MG/DL (ref 0.6–1.2)
EOSINOPHILS ABSOLUTE: 0.1 K/UL (ref 0–0.6)
EOSINOPHILS RELATIVE PERCENT: 2.5 %
GFR AFRICAN AMERICAN: 50
GFR NON-AFRICAN AMERICAN: 41
GLUCOSE BLD-MCNC: 106 MG/DL (ref 70–99)
GLUCOSE BLD-MCNC: 130 MG/DL (ref 70–99)
GLUCOSE BLD-MCNC: 192 MG/DL (ref 70–99)
GLUCOSE BLD-MCNC: 196 MG/DL (ref 70–99)
GLUCOSE BLD-MCNC: 204 MG/DL (ref 70–99)
HCT VFR BLD CALC: 33 % (ref 36–48)
HEMOGLOBIN: 10.5 G/DL (ref 12–16)
LYMPHOCYTES ABSOLUTE: 1.8 K/UL (ref 1–5.1)
LYMPHOCYTES RELATIVE PERCENT: 37.5 %
MCH RBC QN AUTO: 33.5 PG (ref 26–34)
MCHC RBC AUTO-ENTMCNC: 31.9 G/DL (ref 31–36)
MCV RBC AUTO: 104.7 FL (ref 80–100)
MONOCYTES ABSOLUTE: 0.4 K/UL (ref 0–1.3)
MONOCYTES RELATIVE PERCENT: 7.5 %
NEUTROPHILS ABSOLUTE: 2.5 K/UL (ref 1.7–7.7)
NEUTROPHILS RELATIVE PERCENT: 51.9 %
PDW BLD-RTO: 15.2 % (ref 12.4–15.4)
PERFORMED ON: ABNORMAL
PLATELET # BLD: 173 K/UL (ref 135–450)
PMV BLD AUTO: 8.8 FL (ref 5–10.5)
POTASSIUM REFLEX MAGNESIUM: 4.4 MMOL/L (ref 3.5–5.1)
RBC # BLD: 3.15 M/UL (ref 4–5.2)
SEDIMENTATION RATE, ERYTHROCYTE: 87 MM/HR (ref 0–30)
SODIUM BLD-SCNC: 140 MMOL/L (ref 136–145)
TSH REFLEX FT4: 3.58 UIU/ML (ref 0.27–4.2)
WBC # BLD: 4.9 K/UL (ref 4–11)

## 2019-08-21 PROCEDURE — 6370000000 HC RX 637 (ALT 250 FOR IP): Performed by: NURSE PRACTITIONER

## 2019-08-21 PROCEDURE — 2580000003 HC RX 258: Performed by: INTERNAL MEDICINE

## 2019-08-21 PROCEDURE — 96376 TX/PRO/DX INJ SAME DRUG ADON: CPT

## 2019-08-21 PROCEDURE — 86140 C-REACTIVE PROTEIN: CPT

## 2019-08-21 PROCEDURE — 6360000002 HC RX W HCPCS: Performed by: INTERNAL MEDICINE

## 2019-08-21 PROCEDURE — 96366 THER/PROPH/DIAG IV INF ADDON: CPT

## 2019-08-21 PROCEDURE — 6370000000 HC RX 637 (ALT 250 FOR IP): Performed by: INTERNAL MEDICINE

## 2019-08-21 PROCEDURE — 2500000003 HC RX 250 WO HCPCS: Performed by: INTERNAL MEDICINE

## 2019-08-21 PROCEDURE — 96365 THER/PROPH/DIAG IV INF INIT: CPT

## 2019-08-21 PROCEDURE — 94760 N-INVAS EAR/PLS OXIMETRY 1: CPT

## 2019-08-21 PROCEDURE — 85025 COMPLETE CBC W/AUTO DIFF WBC: CPT

## 2019-08-21 PROCEDURE — 2580000003 HC RX 258: Performed by: NURSE PRACTITIONER

## 2019-08-21 PROCEDURE — 96375 TX/PRO/DX INJ NEW DRUG ADDON: CPT

## 2019-08-21 PROCEDURE — 96361 HYDRATE IV INFUSION ADD-ON: CPT

## 2019-08-21 PROCEDURE — G0378 HOSPITAL OBSERVATION PER HR: HCPCS

## 2019-08-21 PROCEDURE — 84443 ASSAY THYROID STIM HORMONE: CPT

## 2019-08-21 PROCEDURE — 2500000003 HC RX 250 WO HCPCS: Performed by: NURSE PRACTITIONER

## 2019-08-21 PROCEDURE — 80048 BASIC METABOLIC PNL TOTAL CA: CPT

## 2019-08-21 PROCEDURE — 85652 RBC SED RATE AUTOMATED: CPT

## 2019-08-21 PROCEDURE — 94640 AIRWAY INHALATION TREATMENT: CPT

## 2019-08-21 PROCEDURE — 36415 COLL VENOUS BLD VENIPUNCTURE: CPT

## 2019-08-21 RX ORDER — HYDRALAZINE HYDROCHLORIDE 25 MG/1
25 TABLET, FILM COATED ORAL 2 TIMES DAILY
Status: DISCONTINUED | OUTPATIENT
Start: 2019-08-21 | End: 2019-08-22

## 2019-08-21 RX ORDER — CLOPIDOGREL BISULFATE 75 MG/1
75 TABLET ORAL DAILY
Status: DISCONTINUED | OUTPATIENT
Start: 2019-08-21 | End: 2019-08-24 | Stop reason: HOSPADM

## 2019-08-21 RX ORDER — AMLODIPINE BESYLATE 10 MG/1
10 TABLET ORAL DAILY
Status: DISCONTINUED | OUTPATIENT
Start: 2019-08-22 | End: 2019-08-22

## 2019-08-21 RX ORDER — 0.9 % SODIUM CHLORIDE 0.9 %
250 INTRAVENOUS SOLUTION INTRAVENOUS ONCE
Status: COMPLETED | OUTPATIENT
Start: 2019-08-21 | End: 2019-08-21

## 2019-08-21 RX ORDER — ASPIRIN 81 MG/1
81 TABLET, CHEWABLE ORAL DAILY
Status: DISCONTINUED | OUTPATIENT
Start: 2019-08-21 | End: 2019-08-24 | Stop reason: HOSPADM

## 2019-08-21 RX ORDER — NIFEDIPINE 30 MG/1
30 TABLET, EXTENDED RELEASE ORAL DAILY
Status: DISCONTINUED | OUTPATIENT
Start: 2019-08-21 | End: 2019-08-21

## 2019-08-21 RX ADMIN — MOMETASONE FUROATE AND FORMOTEROL FUMARATE DIHYDRATE 2 PUFF: 200; 5 AEROSOL RESPIRATORY (INHALATION) at 09:50

## 2019-08-21 RX ADMIN — VALPROATE SODIUM 500 MG: 100 INJECTION, SOLUTION INTRAVENOUS at 13:02

## 2019-08-21 RX ADMIN — DIPHENHYDRAMINE HYDROCHLORIDE 25 MG: 50 INJECTION, SOLUTION INTRAMUSCULAR; INTRAVENOUS at 01:18

## 2019-08-21 RX ADMIN — TOPIRAMATE 100 MG: 100 TABLET, FILM COATED ORAL at 09:53

## 2019-08-21 RX ADMIN — DIVALPROEX SODIUM 250 MG: 250 TABLET, DELAYED RELEASE ORAL at 21:25

## 2019-08-21 RX ADMIN — QUETIAPINE FUMARATE 50 MG: 25 TABLET ORAL at 21:25

## 2019-08-21 RX ADMIN — ISOSORBIDE MONONITRATE 30 MG: 30 TABLET, EXTENDED RELEASE ORAL at 09:53

## 2019-08-21 RX ADMIN — ONDANSETRON 4 MG: 2 INJECTION INTRAMUSCULAR; INTRAVENOUS at 15:41

## 2019-08-21 RX ADMIN — CLOPIDOGREL BISULFATE 75 MG: 75 TABLET ORAL at 17:58

## 2019-08-21 RX ADMIN — DIVALPROEX SODIUM 250 MG: 250 TABLET, DELAYED RELEASE ORAL at 09:52

## 2019-08-21 RX ADMIN — TOPIRAMATE 100 MG: 100 TABLET, FILM COATED ORAL at 21:25

## 2019-08-21 RX ADMIN — SODIUM CHLORIDE 250 ML: 9 INJECTION, SOLUTION INTRAVENOUS at 14:54

## 2019-08-21 RX ADMIN — BUPROPION HYDROCHLORIDE 150 MG: 150 TABLET, FILM COATED, EXTENDED RELEASE ORAL at 09:53

## 2019-08-21 RX ADMIN — FUROSEMIDE 80 MG: 40 TABLET ORAL at 09:52

## 2019-08-21 RX ADMIN — RANOLAZINE 1000 MG: 500 TABLET, FILM COATED, EXTENDED RELEASE ORAL at 09:53

## 2019-08-21 RX ADMIN — PANTOPRAZOLE SODIUM 40 MG: 40 TABLET, DELAYED RELEASE ORAL at 06:51

## 2019-08-21 RX ADMIN — INSULIN LISPRO 4 UNITS: 100 INJECTION, SOLUTION INTRAVENOUS; SUBCUTANEOUS at 13:43

## 2019-08-21 RX ADMIN — SODIUM CHLORIDE, PRESERVATIVE FREE 10 ML: 5 INJECTION INTRAVENOUS at 21:28

## 2019-08-21 RX ADMIN — TIZANIDINE 2 MG: 4 TABLET ORAL at 09:53

## 2019-08-21 RX ADMIN — OXYCODONE HYDROCHLORIDE AND ACETAMINOPHEN 1 TABLET: 7.5; 325 TABLET ORAL at 09:58

## 2019-08-21 RX ADMIN — METOPROLOL SUCCINATE 50 MG: 50 TABLET, FILM COATED, EXTENDED RELEASE ORAL at 09:53

## 2019-08-21 RX ADMIN — ATORVASTATIN CALCIUM 80 MG: 80 TABLET, FILM COATED ORAL at 21:25

## 2019-08-21 RX ADMIN — DIPHENHYDRAMINE HYDROCHLORIDE 25 MG: 50 INJECTION, SOLUTION INTRAMUSCULAR; INTRAVENOUS at 21:37

## 2019-08-21 RX ADMIN — MOMETASONE FUROATE AND FORMOTEROL FUMARATE DIHYDRATE 2 PUFF: 200; 5 AEROSOL RESPIRATORY (INHALATION) at 21:08

## 2019-08-21 RX ADMIN — INSULIN GLARGINE 60 UNITS: 100 INJECTION, SOLUTION SUBCUTANEOUS at 09:54

## 2019-08-21 RX ADMIN — SODIUM CHLORIDE, PRESERVATIVE FREE 10 ML: 5 INJECTION INTRAVENOUS at 09:52

## 2019-08-21 RX ADMIN — HYDRALAZINE HYDROCHLORIDE 50 MG: 50 TABLET, FILM COATED ORAL at 09:53

## 2019-08-21 RX ADMIN — DULOXETINE HYDROCHLORIDE 60 MG: 60 CAPSULE, DELAYED RELEASE ORAL at 09:53

## 2019-08-21 RX ADMIN — VALPROATE SODIUM 500 MG: 100 INJECTION, SOLUTION INTRAVENOUS at 04:06

## 2019-08-21 RX ADMIN — ASPIRIN 81 MG 81 MG: 81 TABLET ORAL at 17:58

## 2019-08-21 RX ADMIN — INSULIN LISPRO 2 UNITS: 100 INJECTION, SOLUTION INTRAVENOUS; SUBCUTANEOUS at 09:54

## 2019-08-21 RX ADMIN — SODIUM CHLORIDE 250 ML: 9 INJECTION, SOLUTION INTRAVENOUS at 15:45

## 2019-08-21 RX ADMIN — DIPHENHYDRAMINE HYDROCHLORIDE 25 MG: 50 INJECTION, SOLUTION INTRAMUSCULAR; INTRAVENOUS at 10:55

## 2019-08-21 RX ADMIN — AMLODIPINE BESYLATE 10 MG: 10 TABLET ORAL at 09:52

## 2019-08-21 RX ADMIN — RANOLAZINE 1000 MG: 500 TABLET, FILM COATED, EXTENDED RELEASE ORAL at 21:25

## 2019-08-21 ASSESSMENT — PAIN DESCRIPTION - DESCRIPTORS
DESCRIPTORS: PRESSURE
DESCRIPTORS: PRESSURE

## 2019-08-21 ASSESSMENT — PAIN SCALES - GENERAL
PAINLEVEL_OUTOF10: 9
PAINLEVEL_OUTOF10: 8
PAINLEVEL_OUTOF10: 4
PAINLEVEL_OUTOF10: 9

## 2019-08-21 ASSESSMENT — PAIN DESCRIPTION - PAIN TYPE
TYPE: ACUTE PAIN
TYPE: ACUTE PAIN

## 2019-08-21 ASSESSMENT — PAIN DESCRIPTION - DIRECTION: RADIATING_TOWARDS: TEMPLES

## 2019-08-21 ASSESSMENT — PAIN DESCRIPTION - LOCATION
LOCATION: HEAD
LOCATION: HEAD

## 2019-08-21 ASSESSMENT — PAIN DESCRIPTION - ORIENTATION
ORIENTATION: UPPER
ORIENTATION: UPPER

## 2019-08-21 ASSESSMENT — PAIN DESCRIPTION - FREQUENCY
FREQUENCY: CONTINUOUS
FREQUENCY: CONTINUOUS

## 2019-08-21 ASSESSMENT — PAIN - FUNCTIONAL ASSESSMENT: PAIN_FUNCTIONAL_ASSESSMENT: ACTIVITIES ARE NOT PREVENTED

## 2019-08-21 ASSESSMENT — PAIN DESCRIPTION - ONSET: ONSET: ON-GOING

## 2019-08-21 ASSESSMENT — PAIN DESCRIPTION - PROGRESSION: CLINICAL_PROGRESSION: NOT CHANGED

## 2019-08-22 ENCOUNTER — APPOINTMENT (OUTPATIENT)
Dept: ULTRASOUND IMAGING | Age: 64
DRG: 304 | End: 2019-08-22
Payer: COMMERCIAL

## 2019-08-22 LAB
ACETAMINOPHEN LEVEL: <5 UG/ML (ref 10–30)
ALBUMIN SERPL-MCNC: 3.6 G/DL (ref 3.4–5)
ALBUMIN SERPL-MCNC: 3.7 G/DL (ref 3.4–5)
ALP BLD-CCNC: 77 U/L (ref 40–129)
ALT SERPL-CCNC: 10 U/L (ref 10–40)
ANION GAP SERPL CALCULATED.3IONS-SCNC: 16 MMOL/L (ref 3–16)
ANION GAP SERPL CALCULATED.3IONS-SCNC: 17 MMOL/L (ref 3–16)
AST SERPL-CCNC: 15 U/L (ref 15–37)
BASOPHILS ABSOLUTE: 0 K/UL (ref 0–0.2)
BASOPHILS RELATIVE PERCENT: 0.2 %
BILIRUB SERPL-MCNC: 0.3 MG/DL (ref 0–1)
BILIRUBIN DIRECT: <0.2 MG/DL (ref 0–0.3)
BILIRUBIN URINE: ABNORMAL
BILIRUBIN, INDIRECT: NORMAL MG/DL (ref 0–1)
BLOOD, URINE: NEGATIVE
BUN BLDV-MCNC: 27 MG/DL (ref 7–20)
BUN BLDV-MCNC: 28 MG/DL (ref 7–20)
CALCIUM SERPL-MCNC: 8.7 MG/DL (ref 8.3–10.6)
CALCIUM SERPL-MCNC: 8.7 MG/DL (ref 8.3–10.6)
CHLORIDE BLD-SCNC: 103 MMOL/L (ref 99–110)
CHLORIDE BLD-SCNC: 111 MMOL/L (ref 99–110)
CLARITY: CLEAR
CO2: 15 MMOL/L (ref 21–32)
CO2: 21 MMOL/L (ref 21–32)
COLOR: ABNORMAL
CREAT SERPL-MCNC: 1.9 MG/DL (ref 0.6–1.2)
CREAT SERPL-MCNC: 1.9 MG/DL (ref 0.6–1.2)
CREATININE URINE: 105.8 MG/DL (ref 28–259)
EOSINOPHILS ABSOLUTE: 0.1 K/UL (ref 0–0.6)
EOSINOPHILS RELATIVE PERCENT: 1.2 %
EPITHELIAL CELLS, UA: 2 /HPF (ref 0–5)
GFR AFRICAN AMERICAN: 32
GFR AFRICAN AMERICAN: 32
GFR NON-AFRICAN AMERICAN: 27
GFR NON-AFRICAN AMERICAN: 27
GLUCOSE BLD-MCNC: 110 MG/DL (ref 70–99)
GLUCOSE BLD-MCNC: 175 MG/DL (ref 70–99)
GLUCOSE BLD-MCNC: 179 MG/DL (ref 70–99)
GLUCOSE BLD-MCNC: 188 MG/DL (ref 70–99)
GLUCOSE BLD-MCNC: 63 MG/DL (ref 70–99)
GLUCOSE BLD-MCNC: 69 MG/DL (ref 70–99)
GLUCOSE BLD-MCNC: 73 MG/DL (ref 70–99)
GLUCOSE URINE: NEGATIVE MG/DL
HCT VFR BLD CALC: 30.1 % (ref 36–48)
HEMOGLOBIN: 10 G/DL (ref 12–16)
HYALINE CASTS: 4 /LPF (ref 0–8)
KETONES, URINE: NEGATIVE MG/DL
LACTIC ACID: 1.2 MMOL/L (ref 0.4–2)
LEUKOCYTE ESTERASE, URINE: ABNORMAL
LYMPHOCYTES ABSOLUTE: 1.3 K/UL (ref 1–5.1)
LYMPHOCYTES RELATIVE PERCENT: 28.3 %
MCH RBC QN AUTO: 34 PG (ref 26–34)
MCHC RBC AUTO-ENTMCNC: 33.3 G/DL (ref 31–36)
MCV RBC AUTO: 102.3 FL (ref 80–100)
MICROSCOPIC EXAMINATION: YES
MONOCYTES ABSOLUTE: 0.4 K/UL (ref 0–1.3)
MONOCYTES RELATIVE PERCENT: 7.7 %
NEUTROPHILS ABSOLUTE: 3 K/UL (ref 1.7–7.7)
NEUTROPHILS RELATIVE PERCENT: 62.6 %
NITRITE, URINE: NEGATIVE
OSMOLALITY URINE: 403 MOSM/KG (ref 390–1070)
PDW BLD-RTO: 14.9 % (ref 12.4–15.4)
PERFORMED ON: ABNORMAL
PERFORMED ON: NORMAL
PH UA: 5.5 (ref 5–8)
PHOSPHORUS: 3.3 MG/DL (ref 2.5–4.9)
PLATELET # BLD: 191 K/UL (ref 135–450)
PMV BLD AUTO: 8.2 FL (ref 5–10.5)
POTASSIUM REFLEX MAGNESIUM: 3.8 MMOL/L (ref 3.5–5.1)
POTASSIUM SERPL-SCNC: 3.5 MMOL/L (ref 3.5–5.1)
PRO-BNP: 343 PG/ML (ref 0–124)
PROTEIN UA: NEGATIVE MG/DL
RBC # BLD: 2.94 M/UL (ref 4–5.2)
RBC UA: 2 /HPF (ref 0–4)
SALICYLATE, SERUM: <0.3 MG/DL (ref 15–30)
SODIUM BLD-SCNC: 140 MMOL/L (ref 136–145)
SODIUM BLD-SCNC: 143 MMOL/L (ref 136–145)
SODIUM URINE: 62 MMOL/L
SPECIFIC GRAVITY UA: 1.02 (ref 1–1.03)
TOTAL CK: 41 U/L (ref 26–192)
UROBILINOGEN, URINE: 0.2 E.U./DL
WBC # BLD: 4.8 K/UL (ref 4–11)
WBC UA: 4 /HPF (ref 0–5)

## 2019-08-22 PROCEDURE — 94760 N-INVAS EAR/PLS OXIMETRY 1: CPT

## 2019-08-22 PROCEDURE — 36415 COLL VENOUS BLD VENIPUNCTURE: CPT

## 2019-08-22 PROCEDURE — 82550 ASSAY OF CK (CPK): CPT

## 2019-08-22 PROCEDURE — 82570 ASSAY OF URINE CREATININE: CPT

## 2019-08-22 PROCEDURE — 81001 URINALYSIS AUTO W/SCOPE: CPT

## 2019-08-22 PROCEDURE — G0480 DRUG TEST DEF 1-7 CLASSES: HCPCS

## 2019-08-22 PROCEDURE — 2500000003 HC RX 250 WO HCPCS: Performed by: INTERNAL MEDICINE

## 2019-08-22 PROCEDURE — 94640 AIRWAY INHALATION TREATMENT: CPT

## 2019-08-22 PROCEDURE — 6370000000 HC RX 637 (ALT 250 FOR IP): Performed by: NURSE PRACTITIONER

## 2019-08-22 PROCEDURE — 2580000003 HC RX 258: Performed by: INTERNAL MEDICINE

## 2019-08-22 PROCEDURE — 86038 ANTINUCLEAR ANTIBODIES: CPT

## 2019-08-22 PROCEDURE — 87086 URINE CULTURE/COLONY COUNT: CPT

## 2019-08-22 PROCEDURE — 80069 RENAL FUNCTION PANEL: CPT

## 2019-08-22 PROCEDURE — 83935 ASSAY OF URINE OSMOLALITY: CPT

## 2019-08-22 PROCEDURE — 96376 TX/PRO/DX INJ SAME DRUG ADON: CPT

## 2019-08-22 PROCEDURE — 6360000002 HC RX W HCPCS: Performed by: INTERNAL MEDICINE

## 2019-08-22 PROCEDURE — 6370000000 HC RX 637 (ALT 250 FOR IP): Performed by: INTERNAL MEDICINE

## 2019-08-22 PROCEDURE — 84155 ASSAY OF PROTEIN SERUM: CPT

## 2019-08-22 PROCEDURE — G0378 HOSPITAL OBSERVATION PER HR: HCPCS

## 2019-08-22 PROCEDURE — 80076 HEPATIC FUNCTION PANEL: CPT

## 2019-08-22 PROCEDURE — 51798 US URINE CAPACITY MEASURE: CPT

## 2019-08-22 PROCEDURE — 96368 THER/DIAG CONCURRENT INF: CPT

## 2019-08-22 PROCEDURE — 83880 ASSAY OF NATRIURETIC PEPTIDE: CPT

## 2019-08-22 PROCEDURE — 84300 ASSAY OF URINE SODIUM: CPT

## 2019-08-22 PROCEDURE — 84165 PROTEIN E-PHORESIS SERUM: CPT

## 2019-08-22 PROCEDURE — 85025 COMPLETE CBC W/AUTO DIFF WBC: CPT

## 2019-08-22 PROCEDURE — 96367 TX/PROPH/DG ADDL SEQ IV INF: CPT

## 2019-08-22 PROCEDURE — 83605 ASSAY OF LACTIC ACID: CPT

## 2019-08-22 PROCEDURE — 76770 US EXAM ABDO BACK WALL COMP: CPT

## 2019-08-22 PROCEDURE — 96366 THER/PROPH/DIAG IV INF ADDON: CPT

## 2019-08-22 PROCEDURE — 96375 TX/PRO/DX INJ NEW DRUG ADDON: CPT

## 2019-08-22 RX ORDER — SODIUM CHLORIDE 450 MG/100ML
INJECTION, SOLUTION INTRAVENOUS CONTINUOUS
Status: DISCONTINUED | OUTPATIENT
Start: 2019-08-22 | End: 2019-08-22

## 2019-08-22 RX ORDER — INSULIN GLARGINE 100 [IU]/ML
30 INJECTION, SOLUTION SUBCUTANEOUS NIGHTLY
Status: DISCONTINUED | OUTPATIENT
Start: 2019-08-22 | End: 2019-08-24 | Stop reason: HOSPADM

## 2019-08-22 RX ORDER — AMLODIPINE BESYLATE 5 MG/1
5 TABLET ORAL DAILY
Status: DISCONTINUED | OUTPATIENT
Start: 2019-08-23 | End: 2019-08-24 | Stop reason: HOSPADM

## 2019-08-22 RX ORDER — SODIUM BICARBONATE 650 MG/1
650 TABLET ORAL 4 TIMES DAILY
Status: DISCONTINUED | OUTPATIENT
Start: 2019-08-22 | End: 2019-08-23

## 2019-08-22 RX ADMIN — CLOPIDOGREL BISULFATE 75 MG: 75 TABLET ORAL at 09:05

## 2019-08-22 RX ADMIN — HYDRALAZINE HYDROCHLORIDE 25 MG: 25 TABLET, FILM COATED ORAL at 09:05

## 2019-08-22 RX ADMIN — DIPHENHYDRAMINE HYDROCHLORIDE 25 MG: 50 INJECTION, SOLUTION INTRAMUSCULAR; INTRAVENOUS at 10:22

## 2019-08-22 RX ADMIN — RANOLAZINE 1000 MG: 500 TABLET, FILM COATED, EXTENDED RELEASE ORAL at 09:05

## 2019-08-22 RX ADMIN — RANOLAZINE 1000 MG: 500 TABLET, FILM COATED, EXTENDED RELEASE ORAL at 21:19

## 2019-08-22 RX ADMIN — MOMETASONE FUROATE AND FORMOTEROL FUMARATE DIHYDRATE 2 PUFF: 200; 5 AEROSOL RESPIRATORY (INHALATION) at 07:54

## 2019-08-22 RX ADMIN — TOPIRAMATE 100 MG: 100 TABLET, FILM COATED ORAL at 21:19

## 2019-08-22 RX ADMIN — DIVALPROEX SODIUM 250 MG: 250 TABLET, DELAYED RELEASE ORAL at 21:19

## 2019-08-22 RX ADMIN — AMLODIPINE BESYLATE 10 MG: 10 TABLET ORAL at 09:05

## 2019-08-22 RX ADMIN — Medication: at 13:22

## 2019-08-22 RX ADMIN — TOPIRAMATE 100 MG: 100 TABLET, FILM COATED ORAL at 09:05

## 2019-08-22 RX ADMIN — VALPROATE SODIUM 500 MG: 100 INJECTION, SOLUTION INTRAVENOUS at 16:40

## 2019-08-22 RX ADMIN — METOPROLOL SUCCINATE 50 MG: 50 TABLET, FILM COATED, EXTENDED RELEASE ORAL at 09:05

## 2019-08-22 RX ADMIN — ATORVASTATIN CALCIUM 80 MG: 80 TABLET, FILM COATED ORAL at 21:20

## 2019-08-22 RX ADMIN — SODIUM CHLORIDE, PRESERVATIVE FREE 10 ML: 5 INJECTION INTRAVENOUS at 21:20

## 2019-08-22 RX ADMIN — DULOXETINE HYDROCHLORIDE 60 MG: 60 CAPSULE, DELAYED RELEASE ORAL at 09:05

## 2019-08-22 RX ADMIN — SODIUM BICARBONATE 650 MG: 650 TABLET ORAL at 18:14

## 2019-08-22 RX ADMIN — ONDANSETRON 4 MG: 2 INJECTION INTRAMUSCULAR; INTRAVENOUS at 13:22

## 2019-08-22 RX ADMIN — ISOSORBIDE MONONITRATE 30 MG: 30 TABLET, EXTENDED RELEASE ORAL at 09:05

## 2019-08-22 RX ADMIN — FUROSEMIDE 80 MG: 40 TABLET ORAL at 09:05

## 2019-08-22 RX ADMIN — QUETIAPINE FUMARATE 50 MG: 25 TABLET ORAL at 21:19

## 2019-08-22 RX ADMIN — PANTOPRAZOLE SODIUM 40 MG: 40 TABLET, DELAYED RELEASE ORAL at 06:57

## 2019-08-22 RX ADMIN — SODIUM BICARBONATE 650 MG: 650 TABLET ORAL at 21:20

## 2019-08-22 RX ADMIN — DIVALPROEX SODIUM 250 MG: 250 TABLET, DELAYED RELEASE ORAL at 09:05

## 2019-08-22 RX ADMIN — MOMETASONE FUROATE AND FORMOTEROL FUMARATE DIHYDRATE 2 PUFF: 200; 5 AEROSOL RESPIRATORY (INHALATION) at 21:46

## 2019-08-22 RX ADMIN — INSULIN LISPRO 2 UNITS: 100 INJECTION, SOLUTION INTRAVENOUS; SUBCUTANEOUS at 13:56

## 2019-08-22 RX ADMIN — DIPHENHYDRAMINE HYDROCHLORIDE 25 MG: 50 INJECTION, SOLUTION INTRAMUSCULAR; INTRAVENOUS at 21:22

## 2019-08-22 RX ADMIN — INSULIN LISPRO 2 UNITS: 100 INJECTION, SOLUTION INTRAVENOUS; SUBCUTANEOUS at 22:19

## 2019-08-22 RX ADMIN — GABAPENTIN 300 MG: 300 CAPSULE ORAL at 21:19

## 2019-08-22 RX ADMIN — SODIUM CHLORIDE, PRESERVATIVE FREE 10 ML: 5 INJECTION INTRAVENOUS at 09:10

## 2019-08-22 RX ADMIN — ASPIRIN 81 MG 81 MG: 81 TABLET ORAL at 09:05

## 2019-08-22 RX ADMIN — OXYCODONE HYDROCHLORIDE AND ACETAMINOPHEN 1 TABLET: 7.5; 325 TABLET ORAL at 12:25

## 2019-08-22 RX ADMIN — BUPROPION HYDROCHLORIDE 150 MG: 150 TABLET, FILM COATED, EXTENDED RELEASE ORAL at 09:05

## 2019-08-22 RX ADMIN — INSULIN GLARGINE 30 UNITS: 100 INJECTION, SOLUTION SUBCUTANEOUS at 22:20

## 2019-08-22 SDOH — ECONOMIC STABILITY: FOOD INSECURITY: ADDITIONAL INFORMATION: NO

## 2019-08-22 ASSESSMENT — PAIN DESCRIPTION - DESCRIPTORS
DESCRIPTORS: HEADACHE
DESCRIPTORS: PRESSURE

## 2019-08-22 ASSESSMENT — PAIN DESCRIPTION - FREQUENCY
FREQUENCY: CONTINUOUS
FREQUENCY: CONTINUOUS

## 2019-08-22 ASSESSMENT — PAIN - FUNCTIONAL ASSESSMENT: PAIN_FUNCTIONAL_ASSESSMENT: ACTIVITIES ARE NOT PREVENTED

## 2019-08-22 ASSESSMENT — PAIN DESCRIPTION - ORIENTATION
ORIENTATION: UPPER
ORIENTATION: UPPER

## 2019-08-22 ASSESSMENT — PAIN DESCRIPTION - LOCATION
LOCATION: HEAD
LOCATION: HEAD

## 2019-08-22 ASSESSMENT — PAIN DESCRIPTION - ONSET
ONSET: ON-GOING
ONSET: ON-GOING

## 2019-08-22 ASSESSMENT — PAIN DESCRIPTION - PAIN TYPE
TYPE: ACUTE PAIN
TYPE: ACUTE PAIN

## 2019-08-22 ASSESSMENT — PAIN SCALES - GENERAL
PAINLEVEL_OUTOF10: 8
PAINLEVEL_OUTOF10: 4

## 2019-08-22 ASSESSMENT — PAIN DESCRIPTION - PROGRESSION
CLINICAL_PROGRESSION: NOT CHANGED
CLINICAL_PROGRESSION: NOT CHANGED

## 2019-08-23 PROBLEM — E87.20 METABOLIC ACIDOSIS: Status: ACTIVE | Noted: 2019-08-23

## 2019-08-23 LAB
ALBUMIN SERPL-MCNC: 3 G/DL (ref 3.1–4.9)
ALBUMIN SERPL-MCNC: 3.3 G/DL (ref 3.4–5)
ALPHA-1-GLOBULIN: 0.3 G/DL (ref 0.2–0.4)
ALPHA-2-GLOBULIN: 1.3 G/DL (ref 0.4–1.1)
ANION GAP SERPL CALCULATED.3IONS-SCNC: 14 MMOL/L (ref 3–16)
ANTI-NUCLEAR ANTIBODY (ANA): NEGATIVE
BASOPHILS ABSOLUTE: 0 K/UL (ref 0–0.2)
BASOPHILS RELATIVE PERCENT: 0.5 %
BETA GLOBULIN: 1.3 G/DL (ref 0.9–1.6)
BUN BLDV-MCNC: 31 MG/DL (ref 7–20)
CALCIUM SERPL-MCNC: 8.5 MG/DL (ref 8.3–10.6)
CHLORIDE BLD-SCNC: 104 MMOL/L (ref 99–110)
CO2: 22 MMOL/L (ref 21–32)
CREAT SERPL-MCNC: 1.6 MG/DL (ref 0.6–1.2)
EOSINOPHILS ABSOLUTE: 0.1 K/UL (ref 0–0.6)
EOSINOPHILS RELATIVE PERCENT: 2.1 %
GAMMA GLOBULIN: 1.2 G/DL (ref 0.6–1.8)
GFR AFRICAN AMERICAN: 39
GFR NON-AFRICAN AMERICAN: 33
GLUCOSE BLD-MCNC: 114 MG/DL (ref 70–99)
GLUCOSE BLD-MCNC: 137 MG/DL (ref 70–99)
GLUCOSE BLD-MCNC: 162 MG/DL (ref 70–99)
GLUCOSE BLD-MCNC: 70 MG/DL (ref 70–99)
GLUCOSE BLD-MCNC: 85 MG/DL (ref 70–99)
GLUCOSE BLD-MCNC: 99 MG/DL (ref 70–99)
HCT VFR BLD CALC: 28.6 % (ref 36–48)
HEMOGLOBIN: 9.5 G/DL (ref 12–16)
LYMPHOCYTES ABSOLUTE: 1 K/UL (ref 1–5.1)
LYMPHOCYTES RELATIVE PERCENT: 27.1 %
MAGNESIUM: 1.8 MG/DL (ref 1.8–2.4)
MCH RBC QN AUTO: 33.9 PG (ref 26–34)
MCHC RBC AUTO-ENTMCNC: 33.4 G/DL (ref 31–36)
MCV RBC AUTO: 101.4 FL (ref 80–100)
MONOCYTES ABSOLUTE: 0.3 K/UL (ref 0–1.3)
MONOCYTES RELATIVE PERCENT: 8.7 %
NEUTROPHILS ABSOLUTE: 2.3 K/UL (ref 1.7–7.7)
NEUTROPHILS RELATIVE PERCENT: 61.6 %
PDW BLD-RTO: 14.7 % (ref 12.4–15.4)
PERFORMED ON: ABNORMAL
PERFORMED ON: NORMAL
PERFORMED ON: NORMAL
PHOSPHORUS: 3.3 MG/DL (ref 2.5–4.9)
PLATELET # BLD: 172 K/UL (ref 135–450)
PMV BLD AUTO: 8.1 FL (ref 5–10.5)
POTASSIUM REFLEX MAGNESIUM: 3.3 MMOL/L (ref 3.5–5.1)
POTASSIUM SERPL-SCNC: 3.3 MMOL/L (ref 3.5–5.1)
RBC # BLD: 2.82 M/UL (ref 4–5.2)
SODIUM BLD-SCNC: 140 MMOL/L (ref 136–145)
SPE/IFE INTERPRETATION: NORMAL
TOTAL PROTEIN: 7 G/DL (ref 6.4–8.2)
WBC # BLD: 3.7 K/UL (ref 4–11)

## 2019-08-23 PROCEDURE — 6370000000 HC RX 637 (ALT 250 FOR IP): Performed by: NURSE PRACTITIONER

## 2019-08-23 PROCEDURE — G0378 HOSPITAL OBSERVATION PER HR: HCPCS

## 2019-08-23 PROCEDURE — 96366 THER/PROPH/DIAG IV INF ADDON: CPT

## 2019-08-23 PROCEDURE — 6370000000 HC RX 637 (ALT 250 FOR IP): Performed by: INTERNAL MEDICINE

## 2019-08-23 PROCEDURE — 2580000003 HC RX 258: Performed by: INTERNAL MEDICINE

## 2019-08-23 PROCEDURE — 80069 RENAL FUNCTION PANEL: CPT

## 2019-08-23 PROCEDURE — 96376 TX/PRO/DX INJ SAME DRUG ADON: CPT

## 2019-08-23 PROCEDURE — 2500000003 HC RX 250 WO HCPCS: Performed by: INTERNAL MEDICINE

## 2019-08-23 PROCEDURE — 83735 ASSAY OF MAGNESIUM: CPT

## 2019-08-23 PROCEDURE — 94761 N-INVAS EAR/PLS OXIMETRY MLT: CPT

## 2019-08-23 PROCEDURE — 85025 COMPLETE CBC W/AUTO DIFF WBC: CPT

## 2019-08-23 PROCEDURE — 36415 COLL VENOUS BLD VENIPUNCTURE: CPT

## 2019-08-23 PROCEDURE — 1200000000 HC SEMI PRIVATE

## 2019-08-23 PROCEDURE — 94640 AIRWAY INHALATION TREATMENT: CPT

## 2019-08-23 PROCEDURE — 51798 US URINE CAPACITY MEASURE: CPT

## 2019-08-23 PROCEDURE — 6360000002 HC RX W HCPCS: Performed by: INTERNAL MEDICINE

## 2019-08-23 PROCEDURE — 93005 ELECTROCARDIOGRAM TRACING: CPT | Performed by: NURSE PRACTITIONER

## 2019-08-23 RX ORDER — SODIUM BICARBONATE 650 MG/1
650 TABLET ORAL 2 TIMES DAILY
Status: DISCONTINUED | OUTPATIENT
Start: 2019-08-23 | End: 2019-08-24 | Stop reason: HOSPADM

## 2019-08-23 RX ORDER — ACETAMINOPHEN 325 MG/1
650 TABLET ORAL EVERY 4 HOURS PRN
Status: DISCONTINUED | OUTPATIENT
Start: 2019-08-23 | End: 2019-08-24 | Stop reason: HOSPADM

## 2019-08-23 RX ORDER — POTASSIUM CHLORIDE 20 MEQ/1
20 TABLET, EXTENDED RELEASE ORAL ONCE
Status: COMPLETED | OUTPATIENT
Start: 2019-08-23 | End: 2019-08-23

## 2019-08-23 RX ORDER — NITROGLYCERIN 0.4 MG/1
0.4 TABLET SUBLINGUAL EVERY 5 MIN PRN
Status: DISCONTINUED | OUTPATIENT
Start: 2019-08-23 | End: 2019-08-24 | Stop reason: HOSPADM

## 2019-08-23 RX ADMIN — OXYCODONE HYDROCHLORIDE AND ACETAMINOPHEN 1 TABLET: 7.5; 325 TABLET ORAL at 18:22

## 2019-08-23 RX ADMIN — MOMETASONE FUROATE AND FORMOTEROL FUMARATE DIHYDRATE 2 PUFF: 200; 5 AEROSOL RESPIRATORY (INHALATION) at 08:42

## 2019-08-23 RX ADMIN — OXYCODONE HYDROCHLORIDE AND ACETAMINOPHEN 1 TABLET: 7.5; 325 TABLET ORAL at 06:17

## 2019-08-23 RX ADMIN — ATORVASTATIN CALCIUM 80 MG: 80 TABLET, FILM COATED ORAL at 21:19

## 2019-08-23 RX ADMIN — POTASSIUM CHLORIDE 20 MEQ: 20 TABLET, EXTENDED RELEASE ORAL at 12:17

## 2019-08-23 RX ADMIN — RANOLAZINE 1000 MG: 500 TABLET, FILM COATED, EXTENDED RELEASE ORAL at 21:19

## 2019-08-23 RX ADMIN — RANOLAZINE 1000 MG: 500 TABLET, FILM COATED, EXTENDED RELEASE ORAL at 08:53

## 2019-08-23 RX ADMIN — AMLODIPINE BESYLATE 5 MG: 5 TABLET ORAL at 08:53

## 2019-08-23 RX ADMIN — DULOXETINE HYDROCHLORIDE 60 MG: 60 CAPSULE, DELAYED RELEASE ORAL at 08:53

## 2019-08-23 RX ADMIN — DIPHENHYDRAMINE HYDROCHLORIDE 25 MG: 50 INJECTION, SOLUTION INTRAMUSCULAR; INTRAVENOUS at 13:37

## 2019-08-23 RX ADMIN — SODIUM BICARBONATE 650 MG: 650 TABLET ORAL at 08:53

## 2019-08-23 RX ADMIN — CLOPIDOGREL BISULFATE 75 MG: 75 TABLET ORAL at 08:53

## 2019-08-23 RX ADMIN — ONDANSETRON 4 MG: 2 INJECTION INTRAMUSCULAR; INTRAVENOUS at 13:37

## 2019-08-23 RX ADMIN — DIVALPROEX SODIUM 250 MG: 250 TABLET, DELAYED RELEASE ORAL at 08:53

## 2019-08-23 RX ADMIN — MOMETASONE FUROATE AND FORMOTEROL FUMARATE DIHYDRATE 2 PUFF: 200; 5 AEROSOL RESPIRATORY (INHALATION) at 21:04

## 2019-08-23 RX ADMIN — Medication: at 00:26

## 2019-08-23 RX ADMIN — ASPIRIN 81 MG 81 MG: 81 TABLET ORAL at 08:53

## 2019-08-23 RX ADMIN — TOPIRAMATE 100 MG: 100 TABLET, FILM COATED ORAL at 21:19

## 2019-08-23 RX ADMIN — QUETIAPINE FUMARATE 50 MG: 25 TABLET ORAL at 21:19

## 2019-08-23 RX ADMIN — Medication: at 11:13

## 2019-08-23 RX ADMIN — PANTOPRAZOLE SODIUM 40 MG: 40 TABLET, DELAYED RELEASE ORAL at 06:17

## 2019-08-23 RX ADMIN — TOPIRAMATE 100 MG: 100 TABLET, FILM COATED ORAL at 08:56

## 2019-08-23 RX ADMIN — METOPROLOL SUCCINATE 50 MG: 50 TABLET, FILM COATED, EXTENDED RELEASE ORAL at 08:53

## 2019-08-23 RX ADMIN — BUPROPION HYDROCHLORIDE 150 MG: 150 TABLET, FILM COATED, EXTENDED RELEASE ORAL at 08:53

## 2019-08-23 RX ADMIN — DIVALPROEX SODIUM 250 MG: 250 TABLET, DELAYED RELEASE ORAL at 21:19

## 2019-08-23 RX ADMIN — ISOSORBIDE MONONITRATE 30 MG: 30 TABLET, EXTENDED RELEASE ORAL at 08:56

## 2019-08-23 RX ADMIN — SODIUM BICARBONATE 650 MG: 650 TABLET ORAL at 21:19

## 2019-08-23 ASSESSMENT — PAIN DESCRIPTION - PROGRESSION
CLINICAL_PROGRESSION: NOT CHANGED
CLINICAL_PROGRESSION_2: NOT CHANGED
CLINICAL_PROGRESSION: NOT CHANGED

## 2019-08-23 ASSESSMENT — PAIN DESCRIPTION - ONSET
ONSET: ON-GOING
ONSET_2: ON-GOING
ONSET: ON-GOING
ONSET: ON-GOING

## 2019-08-23 ASSESSMENT — PAIN DESCRIPTION - DESCRIPTORS
DESCRIPTORS: HEADACHE
DESCRIPTORS: SQUEEZING
DESCRIPTORS: HEADACHE
DESCRIPTORS_2: HEADACHE

## 2019-08-23 ASSESSMENT — PAIN DESCRIPTION - PAIN TYPE
TYPE: ACUTE PAIN
TYPE: ACUTE PAIN
TYPE: CHRONIC PAIN
TYPE_2: ACUTE PAIN
TYPE: CHRONIC PAIN

## 2019-08-23 ASSESSMENT — PAIN DESCRIPTION - ORIENTATION
ORIENTATION: UPPER
ORIENTATION_2: UPPER
ORIENTATION: MID
ORIENTATION: UPPER

## 2019-08-23 ASSESSMENT — PAIN DESCRIPTION - LOCATION
LOCATION: HEAD
LOCATION: HEAD
LOCATION: CHEST
LOCATION: CHEST
LOCATION_2: HEAD

## 2019-08-23 ASSESSMENT — PAIN DESCRIPTION - FREQUENCY
FREQUENCY: CONTINUOUS
FREQUENCY: INTERMITTENT
FREQUENCY: CONTINUOUS

## 2019-08-23 ASSESSMENT — PAIN - FUNCTIONAL ASSESSMENT
PAIN_FUNCTIONAL_ASSESSMENT: ACTIVITIES ARE NOT PREVENTED

## 2019-08-23 ASSESSMENT — PAIN SCALES - GENERAL
PAINLEVEL_OUTOF10: 4
PAINLEVEL_OUTOF10: 10
PAINLEVEL_OUTOF10: 7
PAINLEVEL_OUTOF10: 8
PAINLEVEL_OUTOF10: 8

## 2019-08-23 ASSESSMENT — PAIN DESCRIPTION - INTENSITY: RATING_2: 8

## 2019-08-23 ASSESSMENT — PAIN DESCRIPTION - DURATION: DURATION_2: INTERMITTENT

## 2019-08-24 VITALS
HEIGHT: 62 IN | RESPIRATION RATE: 17 BRPM | SYSTOLIC BLOOD PRESSURE: 141 MMHG | BODY MASS INDEX: 24.3 KG/M2 | HEART RATE: 64 BPM | OXYGEN SATURATION: 100 % | TEMPERATURE: 98.2 F | WEIGHT: 132.06 LBS | DIASTOLIC BLOOD PRESSURE: 82 MMHG

## 2019-08-24 LAB
ALBUMIN SERPL-MCNC: 3.3 G/DL (ref 3.4–5)
ANION GAP SERPL CALCULATED.3IONS-SCNC: 16 MMOL/L (ref 3–16)
BASOPHILS ABSOLUTE: 0 K/UL (ref 0–0.2)
BASOPHILS RELATIVE PERCENT: 0.6 %
BUN BLDV-MCNC: 25 MG/DL (ref 7–20)
CALCIUM SERPL-MCNC: 8.5 MG/DL (ref 8.3–10.6)
CHLORIDE BLD-SCNC: 101 MMOL/L (ref 99–110)
CO2: 21 MMOL/L (ref 21–32)
CREAT SERPL-MCNC: 1.4 MG/DL (ref 0.6–1.2)
EOSINOPHILS ABSOLUTE: 0.1 K/UL (ref 0–0.6)
EOSINOPHILS RELATIVE PERCENT: 1.5 %
GFR AFRICAN AMERICAN: 46
GFR NON-AFRICAN AMERICAN: 38
GLUCOSE BLD-MCNC: 109 MG/DL (ref 70–99)
GLUCOSE BLD-MCNC: 196 MG/DL (ref 70–99)
GLUCOSE BLD-MCNC: 202 MG/DL (ref 70–99)
GLUCOSE BLD-MCNC: 58 MG/DL (ref 70–99)
GLUCOSE BLD-MCNC: 88 MG/DL (ref 70–99)
HCT VFR BLD CALC: 28.6 % (ref 36–48)
HEMOGLOBIN: 9.5 G/DL (ref 12–16)
LYMPHOCYTES ABSOLUTE: 0.9 K/UL (ref 1–5.1)
LYMPHOCYTES RELATIVE PERCENT: 23.5 %
MAGNESIUM: 1.7 MG/DL (ref 1.8–2.4)
MCH RBC QN AUTO: 33.7 PG (ref 26–34)
MCHC RBC AUTO-ENTMCNC: 33.2 G/DL (ref 31–36)
MCV RBC AUTO: 101.6 FL (ref 80–100)
MONOCYTES ABSOLUTE: 0.2 K/UL (ref 0–1.3)
MONOCYTES RELATIVE PERCENT: 6.2 %
NEUTROPHILS ABSOLUTE: 2.7 K/UL (ref 1.7–7.7)
NEUTROPHILS RELATIVE PERCENT: 68.2 %
PDW BLD-RTO: 14.6 % (ref 12.4–15.4)
PERFORMED ON: ABNORMAL
PERFORMED ON: NORMAL
PHOSPHORUS: 2.3 MG/DL (ref 2.5–4.9)
PLATELET # BLD: 149 K/UL (ref 135–450)
PMV BLD AUTO: 8.8 FL (ref 5–10.5)
POTASSIUM REFLEX MAGNESIUM: 3.2 MMOL/L (ref 3.5–5.1)
POTASSIUM SERPL-SCNC: 3.2 MMOL/L (ref 3.5–5.1)
RBC # BLD: 2.81 M/UL (ref 4–5.2)
SODIUM BLD-SCNC: 138 MMOL/L (ref 136–145)
TROPONIN: <0.01 NG/ML
URINE CULTURE, ROUTINE: NORMAL
WBC # BLD: 3.9 K/UL (ref 4–11)

## 2019-08-24 PROCEDURE — 2500000003 HC RX 250 WO HCPCS: Performed by: INTERNAL MEDICINE

## 2019-08-24 PROCEDURE — 6370000000 HC RX 637 (ALT 250 FOR IP): Performed by: INTERNAL MEDICINE

## 2019-08-24 PROCEDURE — 83735 ASSAY OF MAGNESIUM: CPT

## 2019-08-24 PROCEDURE — 85025 COMPLETE CBC W/AUTO DIFF WBC: CPT

## 2019-08-24 PROCEDURE — 51798 US URINE CAPACITY MEASURE: CPT

## 2019-08-24 PROCEDURE — 2580000003 HC RX 258: Performed by: INTERNAL MEDICINE

## 2019-08-24 PROCEDURE — 80069 RENAL FUNCTION PANEL: CPT

## 2019-08-24 PROCEDURE — 36415 COLL VENOUS BLD VENIPUNCTURE: CPT

## 2019-08-24 PROCEDURE — 84484 ASSAY OF TROPONIN QUANT: CPT

## 2019-08-24 PROCEDURE — 6360000002 HC RX W HCPCS: Performed by: INTERNAL MEDICINE

## 2019-08-24 PROCEDURE — 96376 TX/PRO/DX INJ SAME DRUG ADON: CPT

## 2019-08-24 PROCEDURE — 6370000000 HC RX 637 (ALT 250 FOR IP): Performed by: NURSE PRACTITIONER

## 2019-08-24 RX ORDER — AMLODIPINE BESYLATE 10 MG/1
5 TABLET ORAL DAILY
Qty: 30 TABLET | Refills: 0
Start: 2019-08-24 | End: 2019-08-29

## 2019-08-24 RX ORDER — FUROSEMIDE 80 MG
40 TABLET ORAL DAILY
Qty: 30 TABLET | Refills: 0
Start: 2019-08-24 | End: 2019-08-29

## 2019-08-24 RX ORDER — SODIUM BICARBONATE 650 MG/1
650 TABLET ORAL 2 TIMES DAILY
Qty: 30 TABLET | Refills: 0 | Status: SHIPPED | OUTPATIENT
Start: 2019-08-24 | End: 2020-01-14

## 2019-08-24 RX ADMIN — OXYCODONE HYDROCHLORIDE AND ACETAMINOPHEN 1 TABLET: 7.5; 325 TABLET ORAL at 05:03

## 2019-08-24 RX ADMIN — RANOLAZINE 1000 MG: 500 TABLET, FILM COATED, EXTENDED RELEASE ORAL at 08:36

## 2019-08-24 RX ADMIN — TOPIRAMATE 100 MG: 100 TABLET, FILM COATED ORAL at 08:36

## 2019-08-24 RX ADMIN — PANTOPRAZOLE SODIUM 40 MG: 40 TABLET, DELAYED RELEASE ORAL at 06:05

## 2019-08-24 RX ADMIN — DIVALPROEX SODIUM 250 MG: 250 TABLET, DELAYED RELEASE ORAL at 08:36

## 2019-08-24 RX ADMIN — ISOSORBIDE MONONITRATE 30 MG: 30 TABLET, EXTENDED RELEASE ORAL at 08:36

## 2019-08-24 RX ADMIN — BUPROPION HYDROCHLORIDE 150 MG: 150 TABLET, FILM COATED, EXTENDED RELEASE ORAL at 08:36

## 2019-08-24 RX ADMIN — MOMETASONE FUROATE AND FORMOTEROL FUMARATE DIHYDRATE 2 PUFF: 200; 5 AEROSOL RESPIRATORY (INHALATION) at 09:36

## 2019-08-24 RX ADMIN — AMLODIPINE BESYLATE 5 MG: 5 TABLET ORAL at 08:36

## 2019-08-24 RX ADMIN — DIPHENHYDRAMINE HYDROCHLORIDE 25 MG: 50 INJECTION, SOLUTION INTRAMUSCULAR; INTRAVENOUS at 08:57

## 2019-08-24 RX ADMIN — SODIUM CHLORIDE, PRESERVATIVE FREE 10 ML: 5 INJECTION INTRAVENOUS at 08:39

## 2019-08-24 RX ADMIN — METOPROLOL SUCCINATE 50 MG: 50 TABLET, FILM COATED, EXTENDED RELEASE ORAL at 08:36

## 2019-08-24 RX ADMIN — Medication: at 06:06

## 2019-08-24 RX ADMIN — ASPIRIN 81 MG 81 MG: 81 TABLET ORAL at 08:36

## 2019-08-24 RX ADMIN — SODIUM BICARBONATE 650 MG: 650 TABLET ORAL at 08:36

## 2019-08-24 RX ADMIN — CLOPIDOGREL BISULFATE 75 MG: 75 TABLET ORAL at 08:36

## 2019-08-24 RX ADMIN — DULOXETINE HYDROCHLORIDE 60 MG: 60 CAPSULE, DELAYED RELEASE ORAL at 08:40

## 2019-08-24 ASSESSMENT — PAIN DESCRIPTION - LOCATION
LOCATION: CHEST

## 2019-08-24 ASSESSMENT — PAIN DESCRIPTION - ONSET
ONSET: PROGRESSIVE
ONSET: ON-GOING
ONSET: ON-GOING

## 2019-08-24 ASSESSMENT — PAIN SCALES - GENERAL
PAINLEVEL_OUTOF10: 4
PAINLEVEL_OUTOF10: 8
PAINLEVEL_OUTOF10: 10
PAINLEVEL_OUTOF10: 10
PAINLEVEL_OUTOF10: 5
PAINLEVEL_OUTOF10: 10

## 2019-08-24 ASSESSMENT — PAIN DESCRIPTION - PAIN TYPE
TYPE: CHRONIC PAIN

## 2019-08-24 ASSESSMENT — PAIN DESCRIPTION - FREQUENCY
FREQUENCY: INTERMITTENT

## 2019-08-24 ASSESSMENT — PAIN DESCRIPTION - ORIENTATION
ORIENTATION: MID

## 2019-08-24 ASSESSMENT — PAIN DESCRIPTION - PROGRESSION
CLINICAL_PROGRESSION: GRADUALLY IMPROVING
CLINICAL_PROGRESSION: NOT CHANGED
CLINICAL_PROGRESSION: NOT CHANGED

## 2019-08-24 ASSESSMENT — PAIN - FUNCTIONAL ASSESSMENT
PAIN_FUNCTIONAL_ASSESSMENT: ACTIVITIES ARE NOT PREVENTED
PAIN_FUNCTIONAL_ASSESSMENT: PREVENTS OR INTERFERES SOME ACTIVE ACTIVITIES AND ADLS
PAIN_FUNCTIONAL_ASSESSMENT: ACTIVITIES ARE NOT PREVENTED

## 2019-08-24 ASSESSMENT — PAIN DESCRIPTION - DESCRIPTORS
DESCRIPTORS: SQUEEZING

## 2019-08-26 LAB
EKG ATRIAL RATE: 64 BPM
EKG DIAGNOSIS: NORMAL
EKG P-R INTERVAL: 96 MS
EKG Q-T INTERVAL: 432 MS
EKG QRS DURATION: 82 MS
EKG QTC CALCULATION (BAZETT): 445 MS
EKG R AXIS: 41 DEGREES
EKG T AXIS: 64 DEGREES
EKG VENTRICULAR RATE: 64 BPM

## 2019-08-26 PROCEDURE — 93010 ELECTROCARDIOGRAM REPORT: CPT | Performed by: INTERNAL MEDICINE

## 2019-08-27 LAB
CSF CULTURE: NORMAL
GRAM STAIN RESULT: NORMAL

## 2019-08-29 ENCOUNTER — TELEPHONE (OUTPATIENT)
Dept: PRIMARY CARE CLINIC | Age: 64
End: 2019-08-29

## 2019-08-29 ENCOUNTER — OFFICE VISIT (OUTPATIENT)
Dept: PRIMARY CARE CLINIC | Age: 64
End: 2019-08-29
Payer: COMMERCIAL

## 2019-08-29 VITALS
SYSTOLIC BLOOD PRESSURE: 148 MMHG | DIASTOLIC BLOOD PRESSURE: 75 MMHG | BODY MASS INDEX: 23.37 KG/M2 | WEIGHT: 127 LBS | HEIGHT: 62 IN | HEART RATE: 95 BPM

## 2019-08-29 DIAGNOSIS — N28.9 RENAL INSUFFICIENCY: ICD-10-CM

## 2019-08-29 DIAGNOSIS — D50.8 OTHER IRON DEFICIENCY ANEMIA: ICD-10-CM

## 2019-08-29 DIAGNOSIS — E87.6 HYPOKALEMIA: ICD-10-CM

## 2019-08-29 DIAGNOSIS — E83.42 HYPOMAGNESEMIA: ICD-10-CM

## 2019-08-29 DIAGNOSIS — Z09 HOSPITAL DISCHARGE FOLLOW-UP: Primary | ICD-10-CM

## 2019-08-29 DIAGNOSIS — G89.4 CHRONIC PAIN SYNDROME: ICD-10-CM

## 2019-08-29 DIAGNOSIS — I25.10 CORONARY ARTERY DISEASE INVOLVING NATIVE CORONARY ARTERY OF NATIVE HEART WITHOUT ANGINA PECTORIS: ICD-10-CM

## 2019-08-29 DIAGNOSIS — Z09 HOSPITAL DISCHARGE FOLLOW-UP: ICD-10-CM

## 2019-08-29 DIAGNOSIS — G62.9 NEUROPATHY: ICD-10-CM

## 2019-08-29 DIAGNOSIS — I10 ESSENTIAL HYPERTENSION: ICD-10-CM

## 2019-08-29 LAB
A/G RATIO: 1.4 (ref 1.1–2.2)
ALBUMIN SERPL-MCNC: 4.3 G/DL (ref 3.4–5)
ALP BLD-CCNC: 99 U/L (ref 40–129)
ALT SERPL-CCNC: 17 U/L (ref 10–40)
AMPHETAMINE SCREEN, URINE: NORMAL
ANION GAP SERPL CALCULATED.3IONS-SCNC: 19 MMOL/L (ref 3–16)
ANISOCYTOSIS: ABNORMAL
AST SERPL-CCNC: 20 U/L (ref 15–37)
BACTERIA: ABNORMAL /HPF
BARBITURATE SCREEN URINE: NORMAL
BASOPHILS ABSOLUTE: 0.1 K/UL (ref 0–0.2)
BASOPHILS RELATIVE PERCENT: 1 %
BENZODIAZEPINE SCREEN, URINE: NORMAL
BILIRUB SERPL-MCNC: 0.3 MG/DL (ref 0–1)
BILIRUBIN URINE: NEGATIVE
BLOOD, URINE: NEGATIVE
BUN BLDV-MCNC: 22 MG/DL (ref 7–20)
CALCIUM SERPL-MCNC: 9.8 MG/DL (ref 8.3–10.6)
CANNABINOID SCREEN URINE: NORMAL
CHLORIDE BLD-SCNC: 104 MMOL/L (ref 99–110)
CHOLESTEROL, TOTAL: 185 MG/DL (ref 0–199)
CLARITY: ABNORMAL
CO2: 20 MMOL/L (ref 21–32)
COCAINE METABOLITE SCREEN URINE: NORMAL
COLOR: YELLOW
CREAT SERPL-MCNC: 1.5 MG/DL (ref 0.6–1.2)
EOSINOPHILS ABSOLUTE: 0.1 K/UL (ref 0–0.6)
EOSINOPHILS RELATIVE PERCENT: 1 %
EPITHELIAL CELLS, UA: 4 /HPF (ref 0–5)
FOLATE: 18.81 NG/ML (ref 4.78–24.2)
GFR AFRICAN AMERICAN: 42
GFR NON-AFRICAN AMERICAN: 35
GLOBULIN: 3.1 G/DL
GLUCOSE BLD-MCNC: 191 MG/DL (ref 70–99)
GLUCOSE URINE: NEGATIVE MG/DL
HCT VFR BLD CALC: 35.4 % (ref 36–48)
HDLC SERPL-MCNC: 70 MG/DL (ref 40–60)
HEMOGLOBIN: 11.7 G/DL (ref 12–16)
HYALINE CASTS: 6 /LPF (ref 0–8)
KETONES, URINE: NEGATIVE MG/DL
LDL CHOLESTEROL CALCULATED: 89 MG/DL
LEUKOCYTE ESTERASE, URINE: NEGATIVE
LYMPHOCYTES ABSOLUTE: 2.7 K/UL (ref 1–5.1)
LYMPHOCYTES RELATIVE PERCENT: 35 %
Lab: NORMAL
MACROCYTES: ABNORMAL
MAGNESIUM: 2 MG/DL (ref 1.8–2.4)
MCH RBC QN AUTO: 34.4 PG (ref 26–34)
MCHC RBC AUTO-ENTMCNC: 33 G/DL (ref 31–36)
MCV RBC AUTO: 104.1 FL (ref 80–100)
METHADONE SCREEN, URINE: NORMAL
MICROSCOPIC EXAMINATION: YES
MONOCYTES ABSOLUTE: 0.8 K/UL (ref 0–1.3)
MONOCYTES RELATIVE PERCENT: 10 %
NEUTROPHILS ABSOLUTE: 4 K/UL (ref 1.7–7.7)
NEUTROPHILS RELATIVE PERCENT: 53 %
NITRITE, URINE: NEGATIVE
OPIATE SCREEN URINE: NORMAL
OXYCODONE URINE: NORMAL
PDW BLD-RTO: 14.4 % (ref 12.4–15.4)
PH UA: 6
PH UA: 6 (ref 5–8)
PHENCYCLIDINE SCREEN URINE: NORMAL
PLATELET # BLD: 169 K/UL (ref 135–450)
PLATELET SLIDE REVIEW: ADEQUATE
PMV BLD AUTO: 9 FL (ref 5–10.5)
POTASSIUM SERPL-SCNC: 3.6 MMOL/L (ref 3.5–5.1)
PROPOXYPHENE SCREEN: NORMAL
PROTEIN UA: NEGATIVE MG/DL
RBC # BLD: 3.4 M/UL (ref 4–5.2)
RBC UA: 3 /HPF (ref 0–4)
SLIDE REVIEW: ABNORMAL
SODIUM BLD-SCNC: 143 MMOL/L (ref 136–145)
SPECIFIC GRAVITY UA: 1.01 (ref 1–1.03)
TOTAL PROTEIN: 7.4 G/DL (ref 6.4–8.2)
TRIGL SERPL-MCNC: 131 MG/DL (ref 0–150)
TSH SERPL DL<=0.05 MIU/L-ACNC: 2.43 UIU/ML (ref 0.27–4.2)
URINE TYPE: ABNORMAL
UROBILINOGEN, URINE: 0.2 E.U./DL
VITAMIN B-12: 1840 PG/ML (ref 211–911)
VLDLC SERPL CALC-MCNC: 26 MG/DL
WBC # BLD: 7.6 K/UL (ref 4–11)
WBC UA: 3 /HPF (ref 0–5)

## 2019-08-29 PROCEDURE — 1111F DSCHRG MED/CURRENT MED MERGE: CPT | Performed by: INTERNAL MEDICINE

## 2019-08-29 PROCEDURE — 3017F COLORECTAL CA SCREEN DOC REV: CPT | Performed by: INTERNAL MEDICINE

## 2019-08-29 PROCEDURE — G8598 ASA/ANTIPLAT THER USED: HCPCS | Performed by: INTERNAL MEDICINE

## 2019-08-29 PROCEDURE — G8427 DOCREV CUR MEDS BY ELIG CLIN: HCPCS | Performed by: INTERNAL MEDICINE

## 2019-08-29 PROCEDURE — G8420 CALC BMI NORM PARAMETERS: HCPCS | Performed by: INTERNAL MEDICINE

## 2019-08-29 PROCEDURE — 1036F TOBACCO NON-USER: CPT | Performed by: INTERNAL MEDICINE

## 2019-08-29 PROCEDURE — 3045F PR MOST RECENT HEMOGLOBIN A1C LEVEL 7.0-9.0%: CPT | Performed by: INTERNAL MEDICINE

## 2019-08-29 PROCEDURE — 99215 OFFICE O/P EST HI 40 MIN: CPT | Performed by: INTERNAL MEDICINE

## 2019-08-29 PROCEDURE — 2022F DILAT RTA XM EVC RTNOPTHY: CPT | Performed by: INTERNAL MEDICINE

## 2019-08-29 RX ORDER — BUPROPION HYDROCHLORIDE 150 MG/1
150 TABLET ORAL EVERY MORNING
Qty: 30 TABLET | Refills: 5 | Status: SHIPPED | OUTPATIENT
Start: 2019-08-29 | End: 2019-09-03 | Stop reason: SDUPTHER

## 2019-08-29 RX ORDER — FUROSEMIDE 40 MG/1
40 TABLET ORAL DAILY
Qty: 90 TABLET | Refills: 3 | Status: SHIPPED | OUTPATIENT
Start: 2019-08-29 | End: 2020-01-14

## 2019-08-29 RX ORDER — DIVALPROEX SODIUM 250 MG/1
250 TABLET, DELAYED RELEASE ORAL 2 TIMES DAILY
Qty: 60 TABLET | Refills: 5 | Status: SHIPPED | OUTPATIENT
Start: 2019-08-29 | End: 2019-09-03 | Stop reason: ALTCHOICE

## 2019-08-29 RX ORDER — ATORVASTATIN CALCIUM 80 MG/1
80 TABLET, FILM COATED ORAL DAILY
Qty: 90 TABLET | Refills: 3 | Status: SHIPPED | OUTPATIENT
Start: 2019-08-29 | End: 2020-08-05

## 2019-08-29 RX ORDER — GABAPENTIN 300 MG/1
300 CAPSULE ORAL 3 TIMES DAILY PRN
Qty: 180 CAPSULE | Refills: 1 | Status: SHIPPED | OUTPATIENT
Start: 2019-08-29 | End: 2019-09-03

## 2019-08-29 RX ORDER — AMLODIPINE BESYLATE 5 MG/1
5 TABLET ORAL DAILY
Qty: 90 TABLET | Refills: 5 | Status: SHIPPED | OUTPATIENT
Start: 2019-08-29 | End: 2020-10-01 | Stop reason: SDUPTHER

## 2019-08-29 RX ORDER — OMEPRAZOLE 20 MG/1
20 CAPSULE, DELAYED RELEASE ORAL DAILY
Qty: 30 CAPSULE | Refills: 1 | Status: SHIPPED | OUTPATIENT
Start: 2019-08-29 | End: 2020-01-17

## 2019-08-29 RX ORDER — ASPIRIN 81 MG/1
81 TABLET ORAL DAILY
Qty: 90 TABLET | Refills: 5 | Status: SHIPPED | OUTPATIENT
Start: 2019-08-29 | End: 2020-09-17

## 2019-08-29 RX ORDER — NITROGLYCERIN 0.4 MG/1
0.4 TABLET SUBLINGUAL EVERY 5 MIN PRN
Qty: 25 TABLET | Refills: 3 | Status: SHIPPED | OUTPATIENT
Start: 2019-08-29 | End: 2020-12-16 | Stop reason: SDUPTHER

## 2019-08-29 RX ORDER — METOPROLOL SUCCINATE 50 MG/1
25 TABLET, EXTENDED RELEASE ORAL DAILY
Qty: 30 TABLET | Refills: 5 | Status: ON HOLD | OUTPATIENT
Start: 2019-08-29 | End: 2019-09-01 | Stop reason: SDUPTHER

## 2019-08-29 RX ORDER — DULOXETIN HYDROCHLORIDE 60 MG/1
60 CAPSULE, DELAYED RELEASE ORAL DAILY
Qty: 30 CAPSULE | Refills: 5 | Status: SHIPPED | OUTPATIENT
Start: 2019-08-29 | End: 2019-09-03 | Stop reason: SDUPTHER

## 2019-08-29 RX ORDER — RANOLAZINE 1000 MG/1
1000 TABLET, FILM COATED, EXTENDED RELEASE ORAL 2 TIMES DAILY
Qty: 60 TABLET | Refills: 3 | Status: SHIPPED | OUTPATIENT
Start: 2019-08-29 | End: 2019-09-10 | Stop reason: SDUPTHER

## 2019-08-29 RX ORDER — TOPIRAMATE 100 MG/1
100 TABLET, FILM COATED ORAL 2 TIMES DAILY
Qty: 60 TABLET | Refills: 5 | Status: SHIPPED | OUTPATIENT
Start: 2019-08-29 | End: 2019-09-03 | Stop reason: SDUPTHER

## 2019-08-29 RX ORDER — QUETIAPINE FUMARATE 25 MG/1
25-50 TABLET, FILM COATED ORAL NIGHTLY
Qty: 60 TABLET | Refills: 5 | Status: SHIPPED | OUTPATIENT
Start: 2019-08-29 | End: 2019-09-03 | Stop reason: SDUPTHER

## 2019-08-29 RX ORDER — ISOSORBIDE MONONITRATE 30 MG/1
30 TABLET, EXTENDED RELEASE ORAL DAILY
Qty: 90 TABLET | Refills: 5 | Status: SHIPPED | OUTPATIENT
Start: 2019-08-29 | End: 2020-10-01 | Stop reason: SDUPTHER

## 2019-08-29 ASSESSMENT — ENCOUNTER SYMPTOMS
EYES NEGATIVE: 1
CHEST TIGHTNESS: 0
BACK PAIN: 1
ABDOMINAL PAIN: 0
ABDOMINAL DISTENTION: 0

## 2019-08-29 NOTE — PROGRESS NOTES
Subjective:      Patient ID: Iván Garza is a 59 y.o. female. 8/29/19 Patient presents with: Follow-Up from [facility] [address] Date: 8/20/2019      Discharge Date: 8/24/2019       59-year-old female with a Known  hypertension, diabetes, hyperlipidemia, fibromyalgia, COPD, CKD, CHF and CAD     Presented to the ED with complaints of ongoing headache. Initially neurology was consulted and she was treated for migraine headache. She is on prophylactic Depakote and Topamax for this normally. LP was performed showing no acute pathology. Her normal medications were resumed as prescribed on her home medication     During her stay her blood pressure dropped significantly to 89 systolic. Her blood pressure medications were then adjusted. Blood sugar dropped down to 89. Her Lantus and home dosing of insulin therapies were then adjusted. She  developed an KOLBY with a creatinine up to 1.9. She was also with metabolic acidosis that was thought to be related to RTA and she was started on sodium bicarbonate tablets. She rapidly improved with IV hydration and sodium bicarb administration. Diabetic education was ordered and discussed with the patient. .  Home care was set up       Blood pressure as well as blood sugars were stable on the day of discharge. Her creatinine improved to 1.4. Discharge order was placed pending labs however patient was discharged prior to labs being back and she was mildly hypokalemic with hypomagnesemia. Her Lasix dose was decreased and she was instructed to follow-up with her primary care physician in 2 days for follow-up blood testing. She also complained of chest pain during her stay and has had noted CAD on prior catheterization in December. C/O  Cardiology  Dr. Randy Ann. Troponin was negative. She will follow-up with cardiology as an outpatient.       .       Last seen  By me 6/13/19           Fall   Pertinent negatives include no abdominal pain, fever or

## 2019-08-30 ENCOUNTER — TELEPHONE (OUTPATIENT)
Dept: PRIMARY CARE CLINIC | Age: 64
End: 2019-08-30

## 2019-08-30 LAB
ESTIMATED AVERAGE GLUCOSE: 191.5 MG/DL
HBA1C MFR BLD: 8.3 %

## 2019-08-31 ENCOUNTER — APPOINTMENT (OUTPATIENT)
Dept: GENERAL RADIOLOGY | Age: 64
End: 2019-08-31
Payer: COMMERCIAL

## 2019-08-31 ENCOUNTER — HOSPITAL ENCOUNTER (OUTPATIENT)
Age: 64
Setting detail: OBSERVATION
Discharge: HOME | End: 2019-09-01
Attending: INTERNAL MEDICINE | Admitting: INTERNAL MEDICINE
Payer: COMMERCIAL

## 2019-08-31 DIAGNOSIS — I25.10 CORONARY ARTERY DISEASE INVOLVING NATIVE CORONARY ARTERY OF NATIVE HEART WITHOUT ANGINA PECTORIS: ICD-10-CM

## 2019-08-31 DIAGNOSIS — R07.9 CHEST PAIN, UNSPECIFIED TYPE: Primary | ICD-10-CM

## 2019-08-31 DIAGNOSIS — I10 ESSENTIAL HYPERTENSION: ICD-10-CM

## 2019-08-31 LAB
A/G RATIO: 1 (ref 1.1–2.2)
ALBUMIN SERPL-MCNC: 3.6 G/DL (ref 3.4–5)
ALP BLD-CCNC: 84 U/L (ref 40–129)
ALT SERPL-CCNC: 15 U/L (ref 10–40)
ANION GAP SERPL CALCULATED.3IONS-SCNC: 17 MMOL/L (ref 3–16)
AST SERPL-CCNC: 19 U/L (ref 15–37)
BASOPHILS ABSOLUTE: 0.1 K/UL (ref 0–0.2)
BASOPHILS RELATIVE PERCENT: 1.3 %
BILIRUB SERPL-MCNC: <0.2 MG/DL (ref 0–1)
BUN BLDV-MCNC: 20 MG/DL (ref 7–20)
CALCIUM SERPL-MCNC: 9 MG/DL (ref 8.3–10.6)
CHLORIDE BLD-SCNC: 104 MMOL/L (ref 99–110)
CO2: 19 MMOL/L (ref 21–32)
CREAT SERPL-MCNC: 1.2 MG/DL (ref 0.6–1.2)
EOSINOPHILS ABSOLUTE: 0.2 K/UL (ref 0–0.6)
EOSINOPHILS RELATIVE PERCENT: 2.4 %
GFR AFRICAN AMERICAN: 55
GFR NON-AFRICAN AMERICAN: 45
GLOBULIN: 3.7 G/DL
GLUCOSE BLD-MCNC: 156 MG/DL (ref 70–99)
HCT VFR BLD CALC: 30.5 % (ref 36–48)
HEMOGLOBIN: 10.4 G/DL (ref 12–16)
LYMPHOCYTES ABSOLUTE: 2 K/UL (ref 1–5.1)
LYMPHOCYTES RELATIVE PERCENT: 28 %
MCH RBC QN AUTO: 33.8 PG (ref 26–34)
MCHC RBC AUTO-ENTMCNC: 34.2 G/DL (ref 31–36)
MCV RBC AUTO: 98.6 FL (ref 80–100)
MONOCYTES ABSOLUTE: 0.6 K/UL (ref 0–1.3)
MONOCYTES RELATIVE PERCENT: 7.9 %
NEUTROPHILS ABSOLUTE: 4.4 K/UL (ref 1.7–7.7)
NEUTROPHILS RELATIVE PERCENT: 60.4 %
PDW BLD-RTO: 14.5 % (ref 12.4–15.4)
PLATELET # BLD: 254 K/UL (ref 135–450)
PMV BLD AUTO: 8.4 FL (ref 5–10.5)
POTASSIUM SERPL-SCNC: 4 MMOL/L (ref 3.5–5.1)
RBC # BLD: 3.09 M/UL (ref 4–5.2)
SODIUM BLD-SCNC: 140 MMOL/L (ref 136–145)
TOTAL PROTEIN: 7.3 G/DL (ref 6.4–8.2)
TROPONIN: <0.01 NG/ML
WBC # BLD: 7.3 K/UL (ref 4–11)

## 2019-08-31 PROCEDURE — 80053 COMPREHEN METABOLIC PANEL: CPT

## 2019-08-31 PROCEDURE — 71046 X-RAY EXAM CHEST 2 VIEWS: CPT

## 2019-08-31 PROCEDURE — 85025 COMPLETE CBC W/AUTO DIFF WBC: CPT

## 2019-08-31 PROCEDURE — 93005 ELECTROCARDIOGRAM TRACING: CPT | Performed by: PHYSICIAN ASSISTANT

## 2019-08-31 PROCEDURE — 99285 EMERGENCY DEPT VISIT HI MDM: CPT

## 2019-08-31 PROCEDURE — 84484 ASSAY OF TROPONIN QUANT: CPT

## 2019-08-31 ASSESSMENT — PAIN DESCRIPTION - ORIENTATION: ORIENTATION: LEFT

## 2019-08-31 ASSESSMENT — ENCOUNTER SYMPTOMS
CHOKING: 0
BACK PAIN: 0
NAUSEA: 0
EYE DISCHARGE: 0
SORE THROAT: 0
VOMITING: 0
FACIAL SWELLING: 0
SHORTNESS OF BREATH: 0
APNEA: 0
ABDOMINAL PAIN: 0
EYE REDNESS: 0

## 2019-08-31 ASSESSMENT — PAIN DESCRIPTION - PAIN TYPE
TYPE: ACUTE PAIN
TYPE: ACUTE PAIN

## 2019-08-31 ASSESSMENT — PAIN DESCRIPTION - DESCRIPTORS
DESCRIPTORS: ACHING
DESCRIPTORS: ACHING

## 2019-08-31 ASSESSMENT — PAIN DESCRIPTION - FREQUENCY
FREQUENCY: CONTINUOUS
FREQUENCY: CONTINUOUS

## 2019-08-31 ASSESSMENT — PAIN DESCRIPTION - LOCATION
LOCATION: CHEST
LOCATION: HEAD

## 2019-08-31 ASSESSMENT — PAIN - FUNCTIONAL ASSESSMENT: PAIN_FUNCTIONAL_ASSESSMENT: ACTIVITIES ARE NOT PREVENTED

## 2019-08-31 ASSESSMENT — PAIN SCALES - GENERAL
PAINLEVEL_OUTOF10: 8
PAINLEVEL_OUTOF10: 8

## 2019-08-31 ASSESSMENT — PAIN DESCRIPTION - ONSET: ONSET: ON-GOING

## 2019-08-31 ASSESSMENT — PAIN DESCRIPTION - PROGRESSION: CLINICAL_PROGRESSION: NOT CHANGED

## 2019-09-01 VITALS
BODY MASS INDEX: 25.19 KG/M2 | TEMPERATURE: 97.7 F | SYSTOLIC BLOOD PRESSURE: 98 MMHG | OXYGEN SATURATION: 94 % | HEART RATE: 61 BPM | HEIGHT: 60 IN | DIASTOLIC BLOOD PRESSURE: 65 MMHG | RESPIRATION RATE: 18 BRPM | WEIGHT: 128.31 LBS

## 2019-09-01 PROBLEM — R07.9 CHEST PAIN: Status: RESOLVED | Noted: 2019-09-01 | Resolved: 2019-09-01

## 2019-09-01 PROBLEM — R07.9 CHEST PAIN: Status: ACTIVE | Noted: 2019-09-01

## 2019-09-01 LAB — TROPONIN: <0.01 NG/ML

## 2019-09-01 PROCEDURE — 99214 OFFICE O/P EST MOD 30 MIN: CPT | Performed by: INTERNAL MEDICINE

## 2019-09-01 PROCEDURE — 94760 N-INVAS EAR/PLS OXIMETRY 1: CPT

## 2019-09-01 PROCEDURE — 6360000002 HC RX W HCPCS: Performed by: INTERNAL MEDICINE

## 2019-09-01 PROCEDURE — G0378 HOSPITAL OBSERVATION PER HR: HCPCS

## 2019-09-01 PROCEDURE — 96361 HYDRATE IV INFUSION ADD-ON: CPT

## 2019-09-01 PROCEDURE — 96374 THER/PROPH/DIAG INJ IV PUSH: CPT

## 2019-09-01 PROCEDURE — 6360000002 HC RX W HCPCS: Performed by: NURSE PRACTITIONER

## 2019-09-01 PROCEDURE — 96376 TX/PRO/DX INJ SAME DRUG ADON: CPT

## 2019-09-01 PROCEDURE — 96375 TX/PRO/DX INJ NEW DRUG ADDON: CPT

## 2019-09-01 PROCEDURE — 96372 THER/PROPH/DIAG INJ SC/IM: CPT

## 2019-09-01 PROCEDURE — 6370000000 HC RX 637 (ALT 250 FOR IP): Performed by: INTERNAL MEDICINE

## 2019-09-01 PROCEDURE — 2580000003 HC RX 258: Performed by: INTERNAL MEDICINE

## 2019-09-01 PROCEDURE — 6360000002 HC RX W HCPCS: Performed by: PHYSICIAN ASSISTANT

## 2019-09-01 PROCEDURE — 36415 COLL VENOUS BLD VENIPUNCTURE: CPT

## 2019-09-01 PROCEDURE — 94640 AIRWAY INHALATION TREATMENT: CPT

## 2019-09-01 PROCEDURE — 84484 ASSAY OF TROPONIN QUANT: CPT

## 2019-09-01 RX ORDER — ASPIRIN 81 MG/1
81 TABLET, CHEWABLE ORAL DAILY
Status: DISCONTINUED | OUTPATIENT
Start: 2019-09-02 | End: 2019-09-01

## 2019-09-01 RX ORDER — BUPROPION HYDROCHLORIDE 150 MG/1
150 TABLET ORAL EVERY MORNING
Status: DISCONTINUED | OUTPATIENT
Start: 2019-09-01 | End: 2019-09-01 | Stop reason: HOSPADM

## 2019-09-01 RX ORDER — DIPHENHYDRAMINE HYDROCHLORIDE 50 MG/ML
25 INJECTION INTRAMUSCULAR; INTRAVENOUS ONCE
Status: COMPLETED | OUTPATIENT
Start: 2019-09-01 | End: 2019-09-01

## 2019-09-01 RX ORDER — MORPHINE SULFATE 4 MG/ML
4 INJECTION, SOLUTION INTRAMUSCULAR; INTRAVENOUS ONCE
Status: COMPLETED | OUTPATIENT
Start: 2019-09-01 | End: 2019-09-01

## 2019-09-01 RX ORDER — NITROGLYCERIN 0.4 MG/1
0.4 TABLET SUBLINGUAL EVERY 5 MIN PRN
Status: DISCONTINUED | OUTPATIENT
Start: 2019-09-01 | End: 2019-09-01 | Stop reason: HOSPADM

## 2019-09-01 RX ORDER — NITROGLYCERIN 0.4 MG/1
0.4 TABLET SUBLINGUAL EVERY 5 MIN PRN
Status: DISCONTINUED | OUTPATIENT
Start: 2019-09-01 | End: 2019-09-01

## 2019-09-01 RX ORDER — OXYCODONE AND ACETAMINOPHEN 7.5; 325 MG/1; MG/1
1 TABLET ORAL EVERY 8 HOURS PRN
Status: DISCONTINUED | OUTPATIENT
Start: 2019-09-01 | End: 2019-09-01 | Stop reason: HOSPADM

## 2019-09-01 RX ORDER — GABAPENTIN 300 MG/1
300 CAPSULE ORAL 3 TIMES DAILY PRN
Status: DISCONTINUED | OUTPATIENT
Start: 2019-09-01 | End: 2019-09-01 | Stop reason: HOSPADM

## 2019-09-01 RX ORDER — PROCHLORPERAZINE EDISYLATE 5 MG/ML
10 INJECTION INTRAMUSCULAR; INTRAVENOUS ONCE
Status: COMPLETED | OUTPATIENT
Start: 2019-09-01 | End: 2019-09-01

## 2019-09-01 RX ORDER — SODIUM CHLORIDE 0.9 % (FLUSH) 0.9 %
10 SYRINGE (ML) INJECTION PRN
Status: DISCONTINUED | OUTPATIENT
Start: 2019-09-01 | End: 2019-09-01 | Stop reason: HOSPADM

## 2019-09-01 RX ORDER — ISOSORBIDE MONONITRATE 30 MG/1
30 TABLET, EXTENDED RELEASE ORAL DAILY
Status: DISCONTINUED | OUTPATIENT
Start: 2019-09-01 | End: 2019-09-01 | Stop reason: HOSPADM

## 2019-09-01 RX ORDER — METOPROLOL SUCCINATE 50 MG/1
25 TABLET, EXTENDED RELEASE ORAL 2 TIMES DAILY WITH MEALS
Qty: 30 TABLET | Refills: 5 | Status: SHIPPED | OUTPATIENT
Start: 2019-09-01 | End: 2020-10-01 | Stop reason: SDUPTHER

## 2019-09-01 RX ORDER — ASPIRIN 81 MG/1
81 TABLET ORAL DAILY
Status: DISCONTINUED | OUTPATIENT
Start: 2019-09-01 | End: 2019-09-01 | Stop reason: HOSPADM

## 2019-09-01 RX ORDER — ONDANSETRON 2 MG/ML
4 INJECTION INTRAMUSCULAR; INTRAVENOUS EVERY 6 HOURS PRN
Status: DISCONTINUED | OUTPATIENT
Start: 2019-09-01 | End: 2019-09-01 | Stop reason: HOSPADM

## 2019-09-01 RX ORDER — TOPIRAMATE 100 MG/1
100 TABLET, FILM COATED ORAL 2 TIMES DAILY
Status: DISCONTINUED | OUTPATIENT
Start: 2019-09-01 | End: 2019-09-01 | Stop reason: HOSPADM

## 2019-09-01 RX ORDER — ATORVASTATIN CALCIUM 40 MG/1
40 TABLET, FILM COATED ORAL NIGHTLY
Status: DISCONTINUED | OUTPATIENT
Start: 2019-09-01 | End: 2019-09-01

## 2019-09-01 RX ORDER — QUETIAPINE FUMARATE 25 MG/1
25 TABLET, FILM COATED ORAL NIGHTLY
Status: DISCONTINUED | OUTPATIENT
Start: 2019-09-01 | End: 2019-09-01 | Stop reason: HOSPADM

## 2019-09-01 RX ORDER — AMLODIPINE BESYLATE 5 MG/1
5 TABLET ORAL DAILY
Status: DISCONTINUED | OUTPATIENT
Start: 2019-09-01 | End: 2019-09-01 | Stop reason: HOSPADM

## 2019-09-01 RX ORDER — 0.9 % SODIUM CHLORIDE 0.9 %
250 INTRAVENOUS SOLUTION INTRAVENOUS ONCE
Status: COMPLETED | OUTPATIENT
Start: 2019-09-01 | End: 2019-09-01

## 2019-09-01 RX ORDER — FAMOTIDINE 20 MG/1
20 TABLET, FILM COATED ORAL DAILY
Status: DISCONTINUED | OUTPATIENT
Start: 2019-09-01 | End: 2019-09-01 | Stop reason: HOSPADM

## 2019-09-01 RX ORDER — ACETAMINOPHEN 325 MG/1
650 TABLET ORAL EVERY 4 HOURS PRN
Status: DISCONTINUED | OUTPATIENT
Start: 2019-09-01 | End: 2019-09-01 | Stop reason: HOSPADM

## 2019-09-01 RX ORDER — ONDANSETRON 2 MG/ML
4 INJECTION INTRAMUSCULAR; INTRAVENOUS ONCE
Status: COMPLETED | OUTPATIENT
Start: 2019-09-01 | End: 2019-09-01

## 2019-09-01 RX ORDER — DIVALPROEX SODIUM 250 MG/1
250 TABLET, DELAYED RELEASE ORAL 2 TIMES DAILY
Status: DISCONTINUED | OUTPATIENT
Start: 2019-09-01 | End: 2019-09-01 | Stop reason: HOSPADM

## 2019-09-01 RX ORDER — SODIUM CHLORIDE 0.9 % (FLUSH) 0.9 %
10 SYRINGE (ML) INJECTION EVERY 12 HOURS SCHEDULED
Status: DISCONTINUED | OUTPATIENT
Start: 2019-09-01 | End: 2019-09-01 | Stop reason: HOSPADM

## 2019-09-01 RX ORDER — SODIUM BICARBONATE 650 MG/1
650 TABLET ORAL 2 TIMES DAILY
Status: DISCONTINUED | OUTPATIENT
Start: 2019-09-01 | End: 2019-09-01 | Stop reason: HOSPADM

## 2019-09-01 RX ORDER — RANOLAZINE 500 MG/1
1000 TABLET, EXTENDED RELEASE ORAL 2 TIMES DAILY
Status: DISCONTINUED | OUTPATIENT
Start: 2019-09-01 | End: 2019-09-01 | Stop reason: HOSPADM

## 2019-09-01 RX ORDER — METOPROLOL SUCCINATE 25 MG/1
25 TABLET, EXTENDED RELEASE ORAL DAILY
Status: DISCONTINUED | OUTPATIENT
Start: 2019-09-01 | End: 2019-09-01 | Stop reason: HOSPADM

## 2019-09-01 RX ORDER — CLOPIDOGREL BISULFATE 75 MG/1
75 TABLET ORAL DAILY
Status: DISCONTINUED | OUTPATIENT
Start: 2019-09-01 | End: 2019-09-01 | Stop reason: HOSPADM

## 2019-09-01 RX ORDER — ATORVASTATIN CALCIUM 80 MG/1
80 TABLET, FILM COATED ORAL DAILY
Status: DISCONTINUED | OUTPATIENT
Start: 2019-09-01 | End: 2019-09-01 | Stop reason: HOSPADM

## 2019-09-01 RX ORDER — ALBUTEROL SULFATE 90 UG/1
2 AEROSOL, METERED RESPIRATORY (INHALATION) EVERY 6 HOURS PRN
Status: DISCONTINUED | OUTPATIENT
Start: 2019-09-01 | End: 2019-09-01 | Stop reason: HOSPADM

## 2019-09-01 RX ADMIN — PROCHLORPERAZINE EDISYLATE 10 MG: 5 INJECTION INTRAMUSCULAR; INTRAVENOUS at 02:26

## 2019-09-01 RX ADMIN — MOMETASONE FUROATE AND FORMOTEROL FUMARATE DIHYDRATE 2 PUFF: 100; 5 AEROSOL RESPIRATORY (INHALATION) at 08:40

## 2019-09-01 RX ADMIN — AMLODIPINE BESYLATE 5 MG: 5 TABLET ORAL at 08:08

## 2019-09-01 RX ADMIN — ACETAMINOPHEN 650 MG: 325 TABLET ORAL at 08:07

## 2019-09-01 RX ADMIN — MORPHINE SULFATE 4 MG: 4 INJECTION, SOLUTION INTRAMUSCULAR; INTRAVENOUS at 02:26

## 2019-09-01 RX ADMIN — BUPROPION HYDROCHLORIDE 150 MG: 150 TABLET, FILM COATED, EXTENDED RELEASE ORAL at 08:08

## 2019-09-01 RX ADMIN — TOPIRAMATE 100 MG: 100 TABLET, FILM COATED ORAL at 08:08

## 2019-09-01 RX ADMIN — ENOXAPARIN SODIUM 40 MG: 40 INJECTION SUBCUTANEOUS at 08:09

## 2019-09-01 RX ADMIN — CLOPIDOGREL BISULFATE 75 MG: 75 TABLET ORAL at 08:08

## 2019-09-01 RX ADMIN — DIVALPROEX SODIUM 250 MG: 250 TABLET, DELAYED RELEASE ORAL at 08:09

## 2019-09-01 RX ADMIN — SODIUM BICARBONATE 650 MG: 650 TABLET ORAL at 08:08

## 2019-09-01 RX ADMIN — Medication 10 ML: at 10:32

## 2019-09-01 RX ADMIN — ASPIRIN 81 MG: 81 TABLET ORAL at 08:08

## 2019-09-01 RX ADMIN — ISOSORBIDE MONONITRATE 30 MG: 30 TABLET, EXTENDED RELEASE ORAL at 08:08

## 2019-09-01 RX ADMIN — PROCHLORPERAZINE EDISYLATE 10 MG: 5 INJECTION INTRAMUSCULAR; INTRAVENOUS at 10:29

## 2019-09-01 RX ADMIN — METOPROLOL SUCCINATE 25 MG: 25 TABLET, EXTENDED RELEASE ORAL at 08:08

## 2019-09-01 RX ADMIN — MORPHINE SULFATE 4 MG: 4 INJECTION INTRAVENOUS at 00:44

## 2019-09-01 RX ADMIN — DIPHENHYDRAMINE HYDROCHLORIDE 25 MG: 50 INJECTION, SOLUTION INTRAMUSCULAR; INTRAVENOUS at 10:29

## 2019-09-01 RX ADMIN — Medication 10 ML: at 02:26

## 2019-09-01 RX ADMIN — ATORVASTATIN CALCIUM 80 MG: 80 TABLET, FILM COATED ORAL at 08:08

## 2019-09-01 RX ADMIN — Medication 10 ML: at 08:10

## 2019-09-01 RX ADMIN — FAMOTIDINE 20 MG: 20 TABLET ORAL at 08:08

## 2019-09-01 RX ADMIN — SODIUM CHLORIDE 250 ML: 9 INJECTION, SOLUTION INTRAVENOUS at 10:28

## 2019-09-01 RX ADMIN — RANOLAZINE 1000 MG: 500 TABLET, FILM COATED, EXTENDED RELEASE ORAL at 08:07

## 2019-09-01 RX ADMIN — ONDANSETRON 4 MG: 2 INJECTION INTRAMUSCULAR; INTRAVENOUS at 00:44

## 2019-09-01 ASSESSMENT — PAIN DESCRIPTION - ONSET
ONSET: ON-GOING
ONSET_2: ON-GOING
ONSET: ON-GOING

## 2019-09-01 ASSESSMENT — PAIN DESCRIPTION - ORIENTATION
ORIENTATION: RIGHT
ORIENTATION: RIGHT
ORIENTATION: LEFT
ORIENTATION: MID;LEFT
ORIENTATION_2: MID
ORIENTATION: MID;LEFT
ORIENTATION: RIGHT

## 2019-09-01 ASSESSMENT — PAIN DESCRIPTION - DESCRIPTORS
DESCRIPTORS: HEADACHE
DESCRIPTORS: SQUEEZING
DESCRIPTORS: ACHING;SQUEEZING
DESCRIPTORS: SQUEEZING
DESCRIPTORS: HEADACHE
DESCRIPTORS: ACHING
DESCRIPTORS: HEADACHE
DESCRIPTORS_2: ACHING

## 2019-09-01 ASSESSMENT — PAIN SCALES - GENERAL
PAINLEVEL_OUTOF10: 10
PAINLEVEL_OUTOF10: 10
PAINLEVEL_OUTOF10: 8
PAINLEVEL_OUTOF10: 10
PAINLEVEL_OUTOF10: 0
PAINLEVEL_OUTOF10: 0
PAINLEVEL_OUTOF10: 10

## 2019-09-01 ASSESSMENT — PAIN DESCRIPTION - FREQUENCY
FREQUENCY: CONTINUOUS
FREQUENCY: CONTINUOUS
FREQUENCY: INTERMITTENT
FREQUENCY: CONTINUOUS
FREQUENCY: CONTINUOUS
FREQUENCY: INTERMITTENT
FREQUENCY: INTERMITTENT

## 2019-09-01 ASSESSMENT — PAIN DESCRIPTION - PAIN TYPE
TYPE: ACUTE PAIN
TYPE_2: ACUTE PAIN
TYPE: ACUTE PAIN
TYPE: ACUTE PAIN

## 2019-09-01 ASSESSMENT — PAIN DESCRIPTION - LOCATION
LOCATION: HEAD
LOCATION_2: HEAD
LOCATION: CHEST
LOCATION: HEAD
LOCATION: HEAD
LOCATION: CHEST

## 2019-09-01 ASSESSMENT — PAIN DESCRIPTION - PROGRESSION
CLINICAL_PROGRESSION: GRADUALLY WORSENING
CLINICAL_PROGRESSION: NOT CHANGED
CLINICAL_PROGRESSION: NOT CHANGED
CLINICAL_PROGRESSION: RESOLVED
CLINICAL_PROGRESSION_2: GRADUALLY IMPROVING
CLINICAL_PROGRESSION: NOT CHANGED

## 2019-09-01 ASSESSMENT — PAIN - FUNCTIONAL ASSESSMENT
PAIN_FUNCTIONAL_ASSESSMENT: ACTIVITIES ARE NOT PREVENTED
PAIN_FUNCTIONAL_ASSESSMENT: PREVENTS OR INTERFERES SOME ACTIVE ACTIVITIES AND ADLS

## 2019-09-01 ASSESSMENT — PAIN DESCRIPTION - DIRECTION
RADIATING_TOWARDS: JAW
RADIATING_TOWARDS: BACK
RADIATING_TOWARDS: JAW
RADIATING_TOWARDS: BACK
RADIATING_TOWARDS: JAW
RADIATING_TOWARDS: BACK

## 2019-09-01 ASSESSMENT — PAIN DESCRIPTION - DURATION: DURATION_2: INTERMITTENT

## 2019-09-01 ASSESSMENT — PAIN DESCRIPTION - INTENSITY: RATING_2: 3

## 2019-09-01 NOTE — ED TRIAGE NOTES
Patient to ED via [facility] for hypertension. Patient states prior to calling EMS, her blood pressure was systolic in the 678P. Patient states she has a headache that starts at both of her temples and goes straight back in her head. Patient was [facility] on 8/24/19 for her hypertension. States her headache is similar to when she was hospitalized and patient was told to follow up with neurology, but was told [facility] did not take her insurance so she was unable to follow up. Patient BP currently 154/53. NSR on the monitor, 99% on RA. Respirations easy and unlabored. No acute distress noted at this time.

## 2019-09-01 NOTE — PLAN OF CARE
Problem: Pain:  Goal: Pain level will decrease  Description  Pain level will decrease  9/1/2019 1358 by Ash Ceja RN  Outcome: Met This Shift  Note:   The only pain reported by the patient this shift was a headache and she reports the headache has resolved  9/1/2019 0251 by Elizabeth Bray RN  Outcome: Ongoing  Goal: Control of acute pain  Description  Control of acute pain  Outcome: Met This Shift  Goal: Control of chronic pain  Description  Control of chronic pain  Outcome: Met This Shift     Problem: Pain:  Goal: Pain level will decrease  Description  Pain level will decrease  9/1/2019 1358 by Ash Ceja RN  Outcome: Met This Shift  Note:   The only pain reported by the patient this shift was a headache and she reports the headache has resolved  9/1/2019 0251 by Elizabeth Bray RN  Outcome: Ongoing  Goal: Control of acute pain  Description  Control of acute pain  Outcome: Met This Shift  Goal: Control of chronic pain  Description  Control of chronic pain  Outcome: Met This Shift     Problem: Pain:  Goal: Pain level will decrease  Description  Pain level will decrease  9/1/2019 1358 by Ash Ceja RN  Outcome: Met This Shift  Note:   The only pain reported by the patient this shift was a headache and she reports the headache has resolved  9/1/2019 0251 by Elizabeth Bray RN  Outcome: Ongoing  Goal: Control of acute pain  Description  Control of acute pain  Outcome: Met This Shift  Goal: Control of chronic pain  Description  Control of chronic pain  Outcome: Met This Shift     Problem: Falls - Risk of:  Goal: Will remain free from falls  Description  Will remain free from falls  9/1/2019 1358 by Ash Ceja RN  Outcome: Met This Shift  Note:   No falls noted this shift. Patient ambulates as a standby assist, she does not use an assistive device. She is steady on her feet. Bed kept in low position. Safe environment maintained. Bedside table & call light in reach.  Uses call light appropriately when needing assistance. 9/1/2019 0251 by Yuliya Shearer RN  Outcome: Ongoing  Goal: Absence of physical injury  Description  Absence of physical injury  Outcome: Met This Shift     Problem: SAFETY  Goal: Free from accidental physical injury  Outcome: Met This Shift  Note:   No physical injury noted to the patient this shift. Patient has been assessed for fall risk, fall precautions are in place, environment has been cleared of obstacles and reassessed during rounding, bed is in lowest position with the wheels locked, call light is within reach.  ID band has been verified, appropriate transfer methods have been utilized and patient has been educated on safety

## 2019-09-01 NOTE — CONSULTS
puff Inhalation Q6H PRN Reinaldo Mead MD        amLODIPine (NORVASC) tablet 5 mg  5 mg Oral Daily Reinaldo Mead MD   5 mg at 09/01/19 7770    aspirin EC tablet 81 mg  81 mg Oral Daily Reinaldo Mead MD   81 mg at 09/01/19 3427    atorvastatin (LIPITOR) tablet 80 mg  80 mg Oral Daily Reinaldo Mead MD   80 mg at 09/01/19 0808    mometasone-formoterol (DULERA) 100-5 MCG/ACT inhaler 2 puff  2 puff Inhalation BID Reinaldo Mead MD   2 puff at 09/01/19 0840    buPROPion (WELLBUTRIN XL) extended release tablet 150 mg  150 mg Oral QAM Reinaldo Mead MD   150 mg at 09/01/19 6872    clopidogrel (PLAVIX) tablet 75 mg  75 mg Oral Daily Reinaldo Mead MD   75 mg at 09/01/19 7981    divalproex (DEPAKOTE) DR tablet 250 mg  250 mg Oral BID Reinaldo Mead MD   250 mg at 09/01/19 0809    gabapentin (NEURONTIN) capsule 300 mg  300 mg Oral TID PRN Reinaldo Mead MD        isosorbide mononitrate (IMDUR) extended release tablet 30 mg  30 mg Oral Daily Reinaldo Mead MD   30 mg at 09/01/19 6209    metoprolol succinate (TOPROL XL) extended release tablet 25 mg  25 mg Oral Daily Reinaldo Mead MD   25 mg at 09/01/19 0808    oxyCODONE-acetaminophen (PERCOCET) 7.5-325 MG per tablet 1 tablet  1 tablet Oral Q8H PRN Reinaldo Mead MD        QUEtiapine (SEROQUEL) tablet 25 mg  25 mg Oral Nightly Reinaldo Mead MD        ranolazine [facility]) extended release tablet 1,000 mg  1,000 mg Oral BID Reinaldo Mead MD   1,000 mg at 09/01/19 0807    famotidine (PEPCID) tablet 20 mg  20 mg Oral Daily Reinaldo Mead MD   20 mg at 09/01/19 1689    sodium bicarbonate tablet 650 mg  650 mg Oral BID Reinaldo Mead MD   650 mg at 09/01/19 4953    topiramate (TOPAMAX) tablet 100 mg  100 mg Oral BID Reinaldo Mead MD   100 mg at 09/01/19 6117    sodium chloride flush 0.9 % injection 10 mL  10 mL Intravenous 2 times per day Reinaldo Mead MD   10 mL at 09/01/19 0810    sodium chloride flush 0.9 % injection 10 mL  10 mL Intravenous PRN Reinaldo Mead MD   10 mL at 09/01/19 1032    magnesium hydroxide a normal mood and affect. Her speech is normal and behavior is normal.     EKG Interpretation: Sinus bradycardia    Lab Review:   Lab Results   Component Value Date    TRIG 131 08/29/2019    HDL 70 08/29/2019    HDL 58 05/14/2012    LDLCALC 89 08/29/2019    LABVLDL 26 08/29/2019     Lab Results   Component Value Date     08/31/2019    K 4.0 08/31/2019    K 3.2 08/24/2019    BUN 20 08/31/2019    CREATININE 1.2 08/31/2019     Recent Labs     08/31/19  2228   WBC 7.3   HGB 10.4*   HCT 30.5*        Cath 12/20/18 Ashlie Jensen):  ANGIOGRAPHY FINDINGS:  1. Left main comes from the left coronary cusp. YAKELIN 3 flow. No  stenosis. 2.  Left anterior descending artery is patent. Distal left anterior  descending artery has a 70% stenosis. 3.  Left circumflex has patent stents. No significant stenosis. 4.  Right coronary artery has patent stents. No significant stenosis. 5.  LV ejection fraction 60%. Assessment:  1. Chest Pain, atypical  2. Hypertensive Urgency      Plan:  Maren's chest pain is atypical for angina. It is likely due to her uncontrolled hypertension. I would not pursue this as ischemic pain. I do think she should be off the Neurontin as this probably caused some of her hypertension. I would increase her amlodipine to 10mg daily and change her Toprol XL to 25mg bid for better BP control. If she is controlled later today (SBP<150mmHg) she could be discharged to home. She can follow up with Dr. Sierra Stone in the office. We will sign off for now. Please call with questions.

## 2019-09-01 NOTE — ED PROVIDER NOTES
diaphoretic. Psychiatric: She has a normal mood and affect. Her behavior is normal.   Nursing note and vitals reviewed. MEDICAL DECISION MAKING    Vitals:    Vitals:    09/01/19 0540 09/01/19 1044 09/01/19 1505 09/01/19 1758   BP: 121/70 100/63 (!) 96/52 98/65   Pulse: 61 76 55 61   Resp: 18 16 16 18   Temp: 97.4 °F (36.3 °C) 97.4 °F (36.3 °C) 98.8 °F (37.1 °C) 97.7 °F (36.5 °C)   TempSrc: Oral Oral Oral Oral   SpO2: 98% 93% 96% 94%   Weight:       Height:           LABS:  Labs Reviewed   CBC WITH AUTO DIFFERENTIAL - Abnormal; Notable for the following components:       Result Value    RBC 3.09 (*)     Hemoglobin 10.4 (*)     Hematocrit 30.5 (*)     All other components within normal limits    Narrative:     Performed at:  [facility]  [address] [address]   Phone [phone]   COMPREHENSIVE METABOLIC PANEL - Abnormal; Notable for the following components:    CO2 19 (*)     Anion Gap 17 (*)     Glucose 156 (*)     GFR Non- 45 (*)     GFR African American 55 (*)     Albumin/Globulin Ratio 1.0 (*)     All other components within normal limits    Narrative:     Performed at:  [facility]  [address] [facility] [employer] [address]   Phone [phone]   TROPONIN    Narrative:     Performed at:  [facility]  [address] [address]   Phone [phone]   TROPONIN    Narrative:     Performed at:  [facility]  [address] [facility] [address]   Phone [phone] of labs reviewed and werenegative at this time or not returned at the time of this note.     RADIOLOGY:   Non-plain film images such as CT, Ultrasound and MRI are read by the radiologist. Andres Dasilva PA-C have directly visualized the radiologic plain film image(s) with the below findings:        Interpretation per the Radiologist

## 2019-09-02 LAB
EKG ATRIAL RATE: 70 BPM
EKG DIAGNOSIS: NORMAL
EKG P AXIS: 2 DEGREES
EKG P-R INTERVAL: 124 MS
EKG Q-T INTERVAL: 432 MS
EKG QRS DURATION: 92 MS
EKG QTC CALCULATION (BAZETT): 466 MS
EKG R AXIS: 57 DEGREES
EKG T AXIS: 56 DEGREES
EKG VENTRICULAR RATE: 70 BPM

## 2019-09-02 PROCEDURE — 93010 ELECTROCARDIOGRAM REPORT: CPT | Performed by: INTERNAL MEDICINE

## 2019-09-03 ENCOUNTER — TELEPHONE (OUTPATIENT)
Dept: PRIMARY CARE CLINIC | Age: 64
End: 2019-09-03

## 2019-09-03 ENCOUNTER — OFFICE VISIT (OUTPATIENT)
Dept: PAIN MANAGEMENT | Age: 64
End: 2019-09-03
Payer: COMMERCIAL

## 2019-09-03 VITALS
SYSTOLIC BLOOD PRESSURE: 148 MMHG | WEIGHT: 128 LBS | BODY MASS INDEX: 25 KG/M2 | DIASTOLIC BLOOD PRESSURE: 79 MMHG | HEART RATE: 78 BPM

## 2019-09-03 DIAGNOSIS — G89.4 CHRONIC PAIN SYNDROME: ICD-10-CM

## 2019-09-03 DIAGNOSIS — M79.7 FIBROMYALGIA: ICD-10-CM

## 2019-09-03 DIAGNOSIS — M50.30 DDD (DEGENERATIVE DISC DISEASE), CERVICAL: ICD-10-CM

## 2019-09-03 DIAGNOSIS — E11.40 CHRONIC PAINFUL DIABETIC NEUROPATHY (HCC): ICD-10-CM

## 2019-09-03 PROCEDURE — 3045F PR MOST RECENT HEMOGLOBIN A1C LEVEL 7.0-9.0%: CPT | Performed by: INTERNAL MEDICINE

## 2019-09-03 PROCEDURE — G8419 CALC BMI OUT NRM PARAM NOF/U: HCPCS | Performed by: INTERNAL MEDICINE

## 2019-09-03 PROCEDURE — 1111F DSCHRG MED/CURRENT MED MERGE: CPT | Performed by: INTERNAL MEDICINE

## 2019-09-03 PROCEDURE — 1036F TOBACCO NON-USER: CPT | Performed by: INTERNAL MEDICINE

## 2019-09-03 PROCEDURE — 2022F DILAT RTA XM EVC RTNOPTHY: CPT | Performed by: INTERNAL MEDICINE

## 2019-09-03 PROCEDURE — G8598 ASA/ANTIPLAT THER USED: HCPCS | Performed by: INTERNAL MEDICINE

## 2019-09-03 PROCEDURE — G8427 DOCREV CUR MEDS BY ELIG CLIN: HCPCS | Performed by: INTERNAL MEDICINE

## 2019-09-03 PROCEDURE — 99213 OFFICE O/P EST LOW 20 MIN: CPT | Performed by: INTERNAL MEDICINE

## 2019-09-03 PROCEDURE — 3017F COLORECTAL CA SCREEN DOC REV: CPT | Performed by: INTERNAL MEDICINE

## 2019-09-03 RX ORDER — DIVALPROEX SODIUM 500 MG/1
500 TABLET, EXTENDED RELEASE ORAL 2 TIMES DAILY
Qty: 60 TABLET | Refills: 1 | Status: SHIPPED | OUTPATIENT
Start: 2019-09-03 | End: 2019-10-18 | Stop reason: SDUPTHER

## 2019-09-03 RX ORDER — DULOXETIN HYDROCHLORIDE 60 MG/1
60 CAPSULE, DELAYED RELEASE ORAL DAILY
Qty: 30 CAPSULE | Refills: 1 | Status: SHIPPED | OUTPATIENT
Start: 2019-09-03 | End: 2019-10-18 | Stop reason: SDUPTHER

## 2019-09-03 RX ORDER — BUPROPION HYDROCHLORIDE 150 MG/1
150 TABLET ORAL EVERY MORNING
Qty: 30 TABLET | Refills: 1 | Status: SHIPPED | OUTPATIENT
Start: 2019-09-03 | End: 2019-10-18 | Stop reason: SDUPTHER

## 2019-09-03 RX ORDER — OXYCODONE AND ACETAMINOPHEN 7.5; 325 MG/1; MG/1
1 TABLET ORAL EVERY 8 HOURS PRN
Qty: 84 TABLET | Refills: 0 | Status: SHIPPED | OUTPATIENT
Start: 2019-09-03 | End: 2019-10-18 | Stop reason: SDUPTHER

## 2019-09-03 RX ORDER — TOPIRAMATE 100 MG/1
100 TABLET, FILM COATED ORAL 2 TIMES DAILY
Qty: 60 TABLET | Refills: 1 | Status: SHIPPED | OUTPATIENT
Start: 2019-09-03 | End: 2019-10-18 | Stop reason: SDUPTHER

## 2019-09-03 RX ORDER — QUETIAPINE FUMARATE 25 MG/1
25-50 TABLET, FILM COATED ORAL NIGHTLY
Qty: 60 TABLET | Refills: 1 | Status: SHIPPED | OUTPATIENT
Start: 2019-09-03 | End: 2019-10-18 | Stop reason: SDUPTHER

## 2019-09-03 NOTE — TELEPHONE ENCOUNTER
K[facility] Transitions Initial Follow Up Call    Outreach made within 2 business days of discharge: Yes    Patient: Jake Betancourt Patient : 1955   MRN: [id number]  Reason for Admission: There are no discharge diagnoses documented for the most recent discharge. Discharge Date: 19       Spoke with: patient    Discharge department/facility: [facility]    TCM Interactive Patient Contact:  Was patient able to fill all prescriptions: No: wasn't put on any new medications  Was patient instructed to bring all medications to the follow-up visit: Yes  Is patient taking all medications as directed in the discharge summary? Yes  Does patient understand their discharge instructions: Yes  Does patient have questions or concerns that need addressed prior to 7-14 day follow up office visit: yes - pt saw her pain mgmt, Dr. Alexander Rosado today & he told her to stop Gabapentin because of another seizure medication she was on. She wants your opinion on if you agree with her stopping Gabapentin? Please advise.     Scheduled appointment with PCP within 7-14 days    Follow Up  Future Appointments   Date Time Provider Yoni De Jesus   2019  4:30 PM Sobia Hoover MD I [facility]   2019  9:20 AM Raghu Ferro MD [facility] [address] [facility]   10/1/2019  8:40 AM Sascha Li MD [facility]   10/17/2019  1:20 PM Lyndsay Alexander [facility]       [facility] [place]   Patient Services Specialist  L:[phone]

## 2019-09-03 NOTE — PROGRESS NOTES
Brenda Model  1955  [id number]      HISTORY OF PRESENT ILLNESS:  Ms. Jolie Logan is a 59 y.o. female returns for a follow up visit for pain management  She has a diagnosis of   1. Chronic pain syndrome    2. Chronic painful diabetic neuropathy ([facility] [address]    3. Fibromyalgia    4. DDD (degenerative disc disease), cervical    5. Primary insomnia    6. Moderate episode of recurrent major depressive disorder ([facility] [address]    7. Gastro-esophageal reflux disease without esophagitis    8. Intractable migraine with aura without status migrainosus    . She complains of pain all over her body. She rates the pain 10/10 and describes it as aching, burning, throbbing, numbness, tingling, pins and needles. Current treatment regimen has helped relieve about 10% of the pain. She denies any side effects from the current pain regimen. Patient reports that since the last follow up visit the physical functioning is worse, family/social relationships are worse, mood is worse sleep patterns are worse, and that the overall functioning is worse. Patient denies misusing/abusing her narcotic pain medications or using any illegal drugs. There are No indicators for possible drug abuse, addiction or diversion problems. Ms. Jolie Logan states she has been in the ER 2 times for BP/headache issues, she says she is doing better now. She says she \"was having tingling all over her body. \" she was given Neurontin from ER, she is currently on Topamax and Depakote also. She states her insulin dose with changed, blood sugar was high. ALLERGIES: Patients list of allergies were reviewed     MEDICATIONS: Ms. Jolie Logan list of medications were reviewed. Her current medications are   Outpatient Medications Prior to Visit   Medication Sig Dispense Refill    metoprolol succinate (TOPROL XL) 50 MG extended release tablet Take 0.5 tablets by mouth 2 times daily (with meals) 30 tablet 5    insulin glargine (BASAGLAR KWIKPEN) 100 UNIT/ML injection pen Inject 30 Units METER) w/Device KIT USE AS DIRECTED DAILY 1 kit 0    ondansetron (ZOFRAN) 8 MG tablet TAKE 1 TABLET BY MOUTH EVERY 8 HOURS AS NEEDED FOR NAUSEA OR VOMITING 90 tablet 0    budesonide-formoterol (SYMBICORT) 160-4.5 MCG/ACT AERO Inhale 2 puffs into the lungs daily (Patient taking differently: Inhale 2 puffs into the lungs daily as needed ) 11 g 4    albuterol sulfate HFA (PROAIR HFA) 108 (90 Base) MCG/ACT inhaler Inhale 2 puffs into the lungs every 6 hours as needed for Wheezing (Patient taking differently: Inhale 2 puffs into the lungs every 6 hours as needed for Wheezing or Shortness of Breath ) 1 Inhaler 5    clopidogrel (PLAVIX) 75 MG tablet Take 1 tablet by mouth daily 90 tablet 5    ranitidine (ZANTAC) 150 MG tablet Take 1 tablet by mouth 2 times daily as needed for Heartburn 60 tablet 3     No facility-administered medications prior to visit. SOCIAL/FAMILY/PAST MEDICAL HISTORY: Ms. Renny Etienne, family and past medical history was reviewed. REVIEW OF SYSTEMS:    Respiratory: Negative for apnea, chest tightness and shortness of breath or change in baseline breathing. Gastrointestinal: Negative for nausea, vomiting, abdominal pain, diarrhea, constipation, blood in stool and abdominal distention. PHYSICAL EXAM:   Nursing note and vitals reviewed. BP (!) 148/79   Pulse 78   Wt 128 lb (58.1 kg)   BMI 25.00 kg/m²   Constitutional: She appears well-developed and well-nourished. No acute distress. Skin: Skin is warm and dry, good turgor. No rash noted. She is not diaphoretic. Cardiovascular: Normal rate, regular rhythm, normal heart sounds, and does not have murmur. Pulmonary/Chest: Effort normal. No respiratory distress. She does not have wheezes in the lung fields. She has no rales. Neurological/Psychiatric:She is alert and oriented to person, place, and time. Coordination is  normal.  Her mood isAppropriate and affect is Flat/blunted and Anxious . IMPRESSION:   1.  Chronic

## 2019-09-04 ENCOUNTER — TELEPHONE (OUTPATIENT)
Dept: PRIMARY CARE CLINIC | Age: 64
End: 2019-09-04

## 2019-09-04 ENCOUNTER — TELEPHONE (OUTPATIENT)
Dept: PAIN MANAGEMENT | Age: 64
End: 2019-09-04

## 2019-09-04 DIAGNOSIS — G89.4 CHRONIC PAIN SYNDROME: ICD-10-CM

## 2019-09-04 DIAGNOSIS — M79.7 FIBROMYALGIA: ICD-10-CM

## 2019-09-04 DIAGNOSIS — E11.40 CHRONIC PAINFUL DIABETIC NEUROPATHY (HCC): ICD-10-CM

## 2019-09-04 DIAGNOSIS — M50.30 DDD (DEGENERATIVE DISC DISEASE), CERVICAL: ICD-10-CM

## 2019-09-04 DIAGNOSIS — I25.10 CORONARY ARTERY DISEASE INVOLVING NATIVE CORONARY ARTERY OF NATIVE HEART WITHOUT ANGINA PECTORIS: ICD-10-CM

## 2019-09-04 RX ORDER — TIZANIDINE 4 MG/1
TABLET ORAL
Qty: 60 TABLET | Refills: 0 | Status: SHIPPED | OUTPATIENT
Start: 2019-09-04 | End: 2019-10-18 | Stop reason: SDUPTHER

## 2019-09-04 RX ORDER — CLOPIDOGREL BISULFATE 75 MG/1
TABLET ORAL
Qty: 90 TABLET | Refills: 5 | Status: SHIPPED | OUTPATIENT
Start: 2019-09-04 | End: 2020-09-17

## 2019-09-04 NOTE — TELEPHONE ENCOUNTER
Patient asking if she is to no longer take the gabapentiin what else can be suggested to take for the \"tinkling all over \" please advise 08-

## 2019-09-05 ENCOUNTER — TELEPHONE (OUTPATIENT)
Dept: PRIMARY CARE CLINIC | Age: 64
End: 2019-09-05

## 2019-09-07 ASSESSMENT — HEART SCORE: ECG: 1

## 2019-09-10 ENCOUNTER — TELEPHONE (OUTPATIENT)
Dept: PRIMARY CARE CLINIC | Age: 64
End: 2019-09-10

## 2019-09-10 DIAGNOSIS — I25.10 CORONARY ARTERY DISEASE INVOLVING NATIVE CORONARY ARTERY OF NATIVE HEART WITHOUT ANGINA PECTORIS: ICD-10-CM

## 2019-09-10 RX ORDER — RANOLAZINE 1000 MG/1
1000 TABLET, EXTENDED RELEASE ORAL 2 TIMES DAILY
Qty: 60 TABLET | Refills: 8 | Status: SHIPPED | OUTPATIENT
Start: 2019-09-10 | End: 2021-10-29 | Stop reason: SDUPTHER

## 2019-09-24 ENCOUNTER — OFFICE VISIT (OUTPATIENT)
Dept: PRIMARY CARE CLINIC | Age: 64
End: 2019-09-24
Payer: COMMERCIAL

## 2019-09-24 VITALS
SYSTOLIC BLOOD PRESSURE: 120 MMHG | BODY MASS INDEX: 24.54 KG/M2 | DIASTOLIC BLOOD PRESSURE: 60 MMHG | HEIGHT: 60 IN | HEART RATE: 80 BPM | WEIGHT: 125 LBS

## 2019-09-24 DIAGNOSIS — E11.22 TYPE 2 DIABETES MELLITUS WITH CHRONIC KIDNEY DISEASE, WITH LONG-TERM CURRENT USE OF INSULIN, UNSPECIFIED CKD STAGE (HCC): ICD-10-CM

## 2019-09-24 DIAGNOSIS — N28.9 RENAL INSUFFICIENCY: ICD-10-CM

## 2019-09-24 DIAGNOSIS — Z23 NEEDS FLU SHOT: ICD-10-CM

## 2019-09-24 DIAGNOSIS — G44.89 OTHER HEADACHE SYNDROME: Primary | ICD-10-CM

## 2019-09-24 DIAGNOSIS — I10 ESSENTIAL HYPERTENSION: ICD-10-CM

## 2019-09-24 DIAGNOSIS — Z79.4 TYPE 2 DIABETES MELLITUS WITH CHRONIC KIDNEY DISEASE, WITH LONG-TERM CURRENT USE OF INSULIN, UNSPECIFIED CKD STAGE (HCC): ICD-10-CM

## 2019-09-24 PROCEDURE — G0008 ADMIN INFLUENZA VIRUS VAC: HCPCS | Performed by: INTERNAL MEDICINE

## 2019-09-24 PROCEDURE — G8598 ASA/ANTIPLAT THER USED: HCPCS | Performed by: INTERNAL MEDICINE

## 2019-09-24 PROCEDURE — 3017F COLORECTAL CA SCREEN DOC REV: CPT | Performed by: INTERNAL MEDICINE

## 2019-09-24 PROCEDURE — G8420 CALC BMI NORM PARAMETERS: HCPCS | Performed by: INTERNAL MEDICINE

## 2019-09-24 PROCEDURE — 3045F PR MOST RECENT HEMOGLOBIN A1C LEVEL 7.0-9.0%: CPT | Performed by: INTERNAL MEDICINE

## 2019-09-24 PROCEDURE — 99214 OFFICE O/P EST MOD 30 MIN: CPT | Performed by: INTERNAL MEDICINE

## 2019-09-24 PROCEDURE — 90653 IIV ADJUVANT VACCINE IM: CPT | Performed by: INTERNAL MEDICINE

## 2019-09-24 PROCEDURE — 2022F DILAT RTA XM EVC RTNOPTHY: CPT | Performed by: INTERNAL MEDICINE

## 2019-09-24 PROCEDURE — G8427 DOCREV CUR MEDS BY ELIG CLIN: HCPCS | Performed by: INTERNAL MEDICINE

## 2019-09-24 PROCEDURE — 1036F TOBACCO NON-USER: CPT | Performed by: INTERNAL MEDICINE

## 2019-09-24 ASSESSMENT — ENCOUNTER SYMPTOMS
CHEST TIGHTNESS: 0
ABDOMINAL PAIN: 0
EYES NEGATIVE: 1
BACK PAIN: 1
ABDOMINAL DISTENTION: 0

## 2019-09-25 DIAGNOSIS — I10 ESSENTIAL HYPERTENSION: ICD-10-CM

## 2019-09-26 RX ORDER — HYDRALAZINE HYDROCHLORIDE 50 MG/1
50 TABLET, FILM COATED ORAL 2 TIMES DAILY
Qty: 60 TABLET | Refills: 5 | Status: SHIPPED | OUTPATIENT
Start: 2019-09-26 | End: 2020-03-18

## 2019-09-26 RX ORDER — ZOLPIDEM TARTRATE 5 MG/1
TABLET ORAL
Qty: 30 TABLET | Refills: 1 | Status: SHIPPED | OUTPATIENT
Start: 2019-09-26 | End: 2019-10-18

## 2019-10-07 ENCOUNTER — TELEPHONE (OUTPATIENT)
Dept: PULMONOLOGY | Age: 64
End: 2019-10-07

## 2019-10-14 ENCOUNTER — TELEPHONE (OUTPATIENT)
Dept: PRIMARY CARE CLINIC | Age: 64
End: 2019-10-14

## 2019-10-18 ENCOUNTER — OFFICE VISIT (OUTPATIENT)
Dept: PAIN MANAGEMENT | Age: 64
End: 2019-10-18
Payer: COMMERCIAL

## 2019-10-18 VITALS
DIASTOLIC BLOOD PRESSURE: 81 MMHG | SYSTOLIC BLOOD PRESSURE: 137 MMHG | BODY MASS INDEX: 24.41 KG/M2 | WEIGHT: 125 LBS | HEART RATE: 84 BPM

## 2019-10-18 DIAGNOSIS — G43.119 INTRACTABLE MIGRAINE WITH AURA WITHOUT STATUS MIGRAINOSUS: ICD-10-CM

## 2019-10-18 DIAGNOSIS — E11.40 CHRONIC PAINFUL DIABETIC NEUROPATHY (HCC): ICD-10-CM

## 2019-10-18 DIAGNOSIS — F33.1 MODERATE EPISODE OF RECURRENT MAJOR DEPRESSIVE DISORDER (HCC): ICD-10-CM

## 2019-10-18 DIAGNOSIS — K21.9 GASTRO-ESOPHAGEAL REFLUX DISEASE WITHOUT ESOPHAGITIS: ICD-10-CM

## 2019-10-18 DIAGNOSIS — G89.4 CHRONIC PAIN SYNDROME: ICD-10-CM

## 2019-10-18 DIAGNOSIS — M75.81 ROTATOR CUFF TENDONITIS, RIGHT: ICD-10-CM

## 2019-10-18 DIAGNOSIS — M75.81 TENDINITIS OF RIGHT ROTATOR CUFF: ICD-10-CM

## 2019-10-18 DIAGNOSIS — F51.01 PRIMARY INSOMNIA: ICD-10-CM

## 2019-10-18 DIAGNOSIS — M50.30 DDD (DEGENERATIVE DISC DISEASE), CERVICAL: ICD-10-CM

## 2019-10-18 DIAGNOSIS — M79.7 FIBROMYALGIA: ICD-10-CM

## 2019-10-18 PROCEDURE — G8598 ASA/ANTIPLAT THER USED: HCPCS | Performed by: INTERNAL MEDICINE

## 2019-10-18 PROCEDURE — 2022F DILAT RTA XM EVC RTNOPTHY: CPT | Performed by: INTERNAL MEDICINE

## 2019-10-18 PROCEDURE — G8482 FLU IMMUNIZE ORDER/ADMIN: HCPCS | Performed by: INTERNAL MEDICINE

## 2019-10-18 PROCEDURE — G8427 DOCREV CUR MEDS BY ELIG CLIN: HCPCS | Performed by: INTERNAL MEDICINE

## 2019-10-18 PROCEDURE — 1036F TOBACCO NON-USER: CPT | Performed by: INTERNAL MEDICINE

## 2019-10-18 PROCEDURE — 3017F COLORECTAL CA SCREEN DOC REV: CPT | Performed by: INTERNAL MEDICINE

## 2019-10-18 PROCEDURE — 99214 OFFICE O/P EST MOD 30 MIN: CPT | Performed by: INTERNAL MEDICINE

## 2019-10-18 PROCEDURE — G8420 CALC BMI NORM PARAMETERS: HCPCS | Performed by: INTERNAL MEDICINE

## 2019-10-18 RX ORDER — BUPROPION HYDROCHLORIDE 150 MG/1
150 TABLET ORAL EVERY MORNING
Qty: 30 TABLET | Refills: 1 | Status: SHIPPED | OUTPATIENT
Start: 2019-10-18 | End: 2019-11-25

## 2019-10-18 RX ORDER — TIZANIDINE 4 MG/1
TABLET ORAL
Qty: 60 TABLET | Refills: 0 | Status: SHIPPED | OUTPATIENT
Start: 2019-10-18 | End: 2019-11-25

## 2019-10-18 RX ORDER — DULOXETIN HYDROCHLORIDE 60 MG/1
60 CAPSULE, DELAYED RELEASE ORAL DAILY
Qty: 30 CAPSULE | Refills: 1 | Status: SHIPPED | OUTPATIENT
Start: 2019-10-18 | End: 2019-11-25 | Stop reason: SDUPTHER

## 2019-10-18 RX ORDER — QUETIAPINE FUMARATE 25 MG/1
25-50 TABLET, FILM COATED ORAL NIGHTLY
Qty: 60 TABLET | Refills: 1 | Status: SHIPPED | OUTPATIENT
Start: 2019-10-18 | End: 2019-11-25

## 2019-10-18 RX ORDER — DIVALPROEX SODIUM 500 MG/1
500 TABLET, EXTENDED RELEASE ORAL 2 TIMES DAILY
Qty: 60 TABLET | Refills: 1 | Status: SHIPPED | OUTPATIENT
Start: 2019-10-18 | End: 2019-11-25

## 2019-10-18 RX ORDER — OXYCODONE AND ACETAMINOPHEN 7.5; 325 MG/1; MG/1
1 TABLET ORAL EVERY 6 HOURS PRN
Qty: 110 TABLET | Refills: 0 | Status: SHIPPED | OUTPATIENT
Start: 2019-10-18 | End: 2019-11-25 | Stop reason: SDUPTHER

## 2019-10-18 RX ORDER — TOPIRAMATE 100 MG/1
100 TABLET, FILM COATED ORAL 2 TIMES DAILY
Qty: 60 TABLET | Refills: 1 | Status: SHIPPED | OUTPATIENT
Start: 2019-10-18 | End: 2019-11-25 | Stop reason: SDUPTHER

## 2019-10-24 ENCOUNTER — OFFICE VISIT (OUTPATIENT)
Dept: CARDIOLOGY CLINIC | Age: 64
End: 2019-10-24
Payer: COMMERCIAL

## 2019-10-24 VITALS
HEIGHT: 60 IN | WEIGHT: 120 LBS | SYSTOLIC BLOOD PRESSURE: 142 MMHG | BODY MASS INDEX: 23.56 KG/M2 | OXYGEN SATURATION: 98 % | HEART RATE: 62 BPM | DIASTOLIC BLOOD PRESSURE: 80 MMHG

## 2019-10-24 DIAGNOSIS — I48.0 PAROXYSMAL ATRIAL FIBRILLATION (HCC): ICD-10-CM

## 2019-10-24 DIAGNOSIS — I25.118 CORONARY ARTERY DISEASE OF NATIVE ARTERY OF NATIVE HEART WITH STABLE ANGINA PECTORIS (HCC): Primary | ICD-10-CM

## 2019-10-24 DIAGNOSIS — I82.220 INFERIOR VENA CAVA OCCLUSION (HCC): ICD-10-CM

## 2019-10-24 DIAGNOSIS — E78.5 DYSLIPIDEMIA: ICD-10-CM

## 2019-10-24 DIAGNOSIS — I10 ESSENTIAL HYPERTENSION: ICD-10-CM

## 2019-10-24 DIAGNOSIS — I50.32 CHRONIC DIASTOLIC HEART FAILURE (HCC): ICD-10-CM

## 2019-10-24 DIAGNOSIS — D50.8 OTHER IRON DEFICIENCY ANEMIA: ICD-10-CM

## 2019-10-24 PROCEDURE — 99214 OFFICE O/P EST MOD 30 MIN: CPT | Performed by: NURSE PRACTITIONER

## 2019-10-24 PROCEDURE — 3017F COLORECTAL CA SCREEN DOC REV: CPT | Performed by: NURSE PRACTITIONER

## 2019-10-24 PROCEDURE — G8420 CALC BMI NORM PARAMETERS: HCPCS | Performed by: NURSE PRACTITIONER

## 2019-10-24 PROCEDURE — G8598 ASA/ANTIPLAT THER USED: HCPCS | Performed by: NURSE PRACTITIONER

## 2019-10-24 PROCEDURE — 93000 ELECTROCARDIOGRAM COMPLETE: CPT | Performed by: NURSE PRACTITIONER

## 2019-10-24 PROCEDURE — G8482 FLU IMMUNIZE ORDER/ADMIN: HCPCS | Performed by: NURSE PRACTITIONER

## 2019-10-24 PROCEDURE — G8427 DOCREV CUR MEDS BY ELIG CLIN: HCPCS | Performed by: NURSE PRACTITIONER

## 2019-10-24 PROCEDURE — 1036F TOBACCO NON-USER: CPT | Performed by: NURSE PRACTITIONER

## 2019-10-25 ENCOUNTER — TELEPHONE (OUTPATIENT)
Dept: PRIMARY CARE CLINIC | Age: 64
End: 2019-10-25

## 2019-10-27 DIAGNOSIS — G47.00 INSOMNIA, UNSPECIFIED TYPE: Primary | ICD-10-CM

## 2019-10-28 RX ORDER — ZOLPIDEM TARTRATE 5 MG/1
TABLET ORAL
Qty: 30 TABLET | Refills: 1 | Status: SHIPPED | OUTPATIENT
Start: 2019-10-28 | End: 2019-11-27

## 2019-11-05 ENCOUNTER — TELEPHONE (OUTPATIENT)
Dept: PRIMARY CARE CLINIC | Age: 64
End: 2019-11-05

## 2019-11-05 RX ORDER — LOPERAMIDE HYDROCHLORIDE 2 MG/1
2 CAPSULE ORAL 2 TIMES DAILY PRN
Qty: 8 CAPSULE | Refills: 0 | Status: SHIPPED | OUTPATIENT
Start: 2019-11-05 | End: 2019-11-09

## 2019-11-08 ENCOUNTER — TELEPHONE (OUTPATIENT)
Dept: PAIN MANAGEMENT | Age: 64
End: 2019-11-08

## 2019-11-15 ENCOUNTER — TELEPHONE (OUTPATIENT)
Dept: PRIMARY CARE CLINIC | Age: 64
End: 2019-11-15

## 2019-11-15 DIAGNOSIS — F01.50 VASCULAR DEMENTIA WITHOUT BEHAVIORAL DISTURBANCE (HCC): Primary | ICD-10-CM

## 2019-11-17 DIAGNOSIS — F33.1 MODERATE EPISODE OF RECURRENT MAJOR DEPRESSIVE DISORDER (HCC): ICD-10-CM

## 2019-11-19 DIAGNOSIS — G43.119 INTRACTABLE MIGRAINE WITH AURA WITHOUT STATUS MIGRAINOSUS: ICD-10-CM

## 2019-11-19 DIAGNOSIS — I10 ESSENTIAL HYPERTENSION: ICD-10-CM

## 2019-11-19 DIAGNOSIS — I25.10 CORONARY ARTERY DISEASE INVOLVING NATIVE CORONARY ARTERY OF NATIVE HEART WITHOUT ANGINA PECTORIS: ICD-10-CM

## 2019-11-19 DIAGNOSIS — M79.7 FIBROMYALGIA: ICD-10-CM

## 2019-11-19 DIAGNOSIS — G89.4 CHRONIC PAIN SYNDROME: ICD-10-CM

## 2019-11-20 RX ORDER — TORSEMIDE 10 MG/1
TABLET ORAL
Qty: 60 TABLET | Refills: 5 | Status: SHIPPED | OUTPATIENT
Start: 2019-11-20 | End: 2020-04-13

## 2019-11-21 DIAGNOSIS — I10 ESSENTIAL HYPERTENSION: ICD-10-CM

## 2019-11-21 RX ORDER — FUROSEMIDE 20 MG/1
20 TABLET ORAL DAILY
Qty: 30 TABLET | Refills: 5 | Status: SHIPPED | OUTPATIENT
Start: 2019-11-21 | End: 2020-01-14

## 2019-11-22 ENCOUNTER — TELEPHONE (OUTPATIENT)
Dept: PRIMARY CARE CLINIC | Age: 64
End: 2019-11-22

## 2019-11-22 RX ORDER — LOPERAMIDE HYDROCHLORIDE 2 MG/1
2 CAPSULE ORAL 2 TIMES DAILY PRN
Qty: 10 CAPSULE | Refills: 0 | Status: SHIPPED | OUTPATIENT
Start: 2019-11-22 | End: 2019-11-27

## 2019-11-25 ENCOUNTER — OFFICE VISIT (OUTPATIENT)
Dept: PAIN MANAGEMENT | Age: 64
End: 2019-11-25
Payer: COMMERCIAL

## 2019-11-25 VITALS
SYSTOLIC BLOOD PRESSURE: 179 MMHG | BODY MASS INDEX: 22.26 KG/M2 | DIASTOLIC BLOOD PRESSURE: 86 MMHG | HEART RATE: 77 BPM | WEIGHT: 114 LBS

## 2019-11-25 DIAGNOSIS — M79.7 FIBROMYALGIA: ICD-10-CM

## 2019-11-25 DIAGNOSIS — G43.119 INTRACTABLE MIGRAINE WITH AURA WITHOUT STATUS MIGRAINOSUS: ICD-10-CM

## 2019-11-25 DIAGNOSIS — G89.4 CHRONIC PAIN SYNDROME: ICD-10-CM

## 2019-11-25 DIAGNOSIS — M50.30 DDD (DEGENERATIVE DISC DISEASE), CERVICAL: ICD-10-CM

## 2019-11-25 DIAGNOSIS — E11.40 CHRONIC PAINFUL DIABETIC NEUROPATHY (HCC): ICD-10-CM

## 2019-11-25 PROCEDURE — 2022F DILAT RTA XM EVC RTNOPTHY: CPT | Performed by: INTERNAL MEDICINE

## 2019-11-25 PROCEDURE — 3017F COLORECTAL CA SCREEN DOC REV: CPT | Performed by: INTERNAL MEDICINE

## 2019-11-25 PROCEDURE — G8420 CALC BMI NORM PARAMETERS: HCPCS | Performed by: INTERNAL MEDICINE

## 2019-11-25 PROCEDURE — G8482 FLU IMMUNIZE ORDER/ADMIN: HCPCS | Performed by: INTERNAL MEDICINE

## 2019-11-25 PROCEDURE — 99213 OFFICE O/P EST LOW 20 MIN: CPT | Performed by: INTERNAL MEDICINE

## 2019-11-25 PROCEDURE — 1036F TOBACCO NON-USER: CPT | Performed by: INTERNAL MEDICINE

## 2019-11-25 PROCEDURE — G8598 ASA/ANTIPLAT THER USED: HCPCS | Performed by: INTERNAL MEDICINE

## 2019-11-25 PROCEDURE — G8427 DOCREV CUR MEDS BY ELIG CLIN: HCPCS | Performed by: INTERNAL MEDICINE

## 2019-11-25 RX ORDER — OXYCODONE AND ACETAMINOPHEN 7.5; 325 MG/1; MG/1
1 TABLET ORAL EVERY 6 HOURS PRN
Qty: 126 TABLET | Refills: 0 | Status: SHIPPED | OUTPATIENT
Start: 2019-11-25 | End: 2020-01-08 | Stop reason: SDUPTHER

## 2019-11-25 RX ORDER — DULOXETIN HYDROCHLORIDE 60 MG/1
60 CAPSULE, DELAYED RELEASE ORAL DAILY
Qty: 30 CAPSULE | Refills: 1 | Status: SHIPPED | OUTPATIENT
Start: 2019-11-25 | End: 2020-01-08 | Stop reason: SDUPTHER

## 2019-11-25 RX ORDER — TOPIRAMATE 100 MG/1
100 TABLET, FILM COATED ORAL 2 TIMES DAILY
Qty: 60 TABLET | Refills: 1 | Status: SHIPPED | OUTPATIENT
Start: 2019-11-25 | End: 2020-01-08 | Stop reason: SDUPTHER

## 2019-11-27 RX ORDER — DIVALPROEX SODIUM 500 MG/1
500 TABLET, EXTENDED RELEASE ORAL 2 TIMES DAILY
Qty: 60 TABLET | Refills: 1 | OUTPATIENT
Start: 2019-11-27

## 2019-12-04 ENCOUNTER — TELEPHONE (OUTPATIENT)
Dept: PAIN MANAGEMENT | Age: 64
End: 2019-12-04

## 2019-12-05 RX ORDER — DULOXETIN HYDROCHLORIDE 60 MG/1
60 CAPSULE, DELAYED RELEASE ORAL DAILY
Qty: 30 CAPSULE | Refills: 1 | OUTPATIENT
Start: 2019-12-05

## 2019-12-05 RX ORDER — TOPIRAMATE 100 MG/1
100 TABLET, FILM COATED ORAL 2 TIMES DAILY
Qty: 60 TABLET | Refills: 1 | OUTPATIENT
Start: 2019-12-05

## 2019-12-06 DIAGNOSIS — J44.0 COPD WITH ACUTE BRONCHITIS (HCC): ICD-10-CM

## 2019-12-06 DIAGNOSIS — J20.9 COPD WITH ACUTE BRONCHITIS (HCC): ICD-10-CM

## 2019-12-06 RX ORDER — ALBUTEROL SULFATE 90 UG/1
2 AEROSOL, METERED RESPIRATORY (INHALATION) EVERY 4 HOURS PRN
Qty: 3 INHALER | Refills: 5 | Status: SHIPPED | OUTPATIENT
Start: 2019-12-06 | End: 2022-05-19

## 2019-12-06 RX ORDER — BUDESONIDE AND FORMOTEROL FUMARATE DIHYDRATE 160; 4.5 UG/1; UG/1
2 AEROSOL RESPIRATORY (INHALATION) DAILY
Qty: 2 INHALER | Refills: 5 | Status: ON HOLD | OUTPATIENT
Start: 2019-12-06 | End: 2022-01-10 | Stop reason: ALTCHOICE

## 2019-12-08 ENCOUNTER — APPOINTMENT (OUTPATIENT)
Dept: GENERAL RADIOLOGY | Age: 64
End: 2019-12-08
Payer: COMMERCIAL

## 2019-12-08 ENCOUNTER — HOSPITAL ENCOUNTER (EMERGENCY)
Age: 64
Discharge: ANOTHER HOSPITAL | End: 2019-12-08
Attending: EMERGENCY MEDICINE
Payer: COMMERCIAL

## 2019-12-08 VITALS
WEIGHT: 113.76 LBS | HEART RATE: 71 BPM | SYSTOLIC BLOOD PRESSURE: 168 MMHG | RESPIRATION RATE: 16 BRPM | OXYGEN SATURATION: 100 % | BODY MASS INDEX: 22.33 KG/M2 | HEIGHT: 60 IN | DIASTOLIC BLOOD PRESSURE: 72 MMHG | TEMPERATURE: 98.2 F

## 2019-12-08 DIAGNOSIS — I25.9 CHEST PAIN DUE TO MYOCARDIAL ISCHEMIA, UNSPECIFIED ISCHEMIC CHEST PAIN TYPE: Primary | ICD-10-CM

## 2019-12-08 DIAGNOSIS — N17.9 AKI (ACUTE KIDNEY INJURY) (HCC): ICD-10-CM

## 2019-12-08 DIAGNOSIS — R73.9 HYPERGLYCEMIA: ICD-10-CM

## 2019-12-08 LAB
ANION GAP SERPL CALCULATED.3IONS-SCNC: 18 MMOL/L (ref 3–16)
BASE EXCESS VENOUS: 0 MMOL/L (ref -3–3)
BASOPHILS ABSOLUTE: 0 K/UL (ref 0–0.2)
BASOPHILS RELATIVE PERCENT: 0.3 %
BUN BLDV-MCNC: 60 MG/DL (ref 7–20)
CALCIUM SERPL-MCNC: 10.8 MG/DL (ref 8.3–10.6)
CHLORIDE BLD-SCNC: 93 MMOL/L (ref 99–110)
CO2: 24 MMOL/L (ref 21–32)
CREAT SERPL-MCNC: 2.2 MG/DL (ref 0.6–1.2)
EOSINOPHILS ABSOLUTE: 0.1 K/UL (ref 0–0.6)
EOSINOPHILS RELATIVE PERCENT: 1.1 %
GFR AFRICAN AMERICAN: 27
GFR NON-AFRICAN AMERICAN: 23
GLUCOSE BLD-MCNC: 105 MG/DL (ref 70–99)
GLUCOSE BLD-MCNC: 131 MG/DL (ref 70–99)
GLUCOSE BLD-MCNC: 149 MG/DL (ref 70–99)
GLUCOSE BLD-MCNC: 223 MG/DL (ref 70–99)
GLUCOSE BLD-MCNC: 438 MG/DL (ref 70–99)
GLUCOSE BLD-MCNC: 439 MG/DL (ref 70–99)
HCO3 VENOUS: 25.2 MMOL/L (ref 23–29)
HCT VFR BLD CALC: 33 % (ref 36–48)
HEMOGLOBIN: 10.9 G/DL (ref 12–16)
LYMPHOCYTES ABSOLUTE: 1.3 K/UL (ref 1–5.1)
LYMPHOCYTES RELATIVE PERCENT: 21.5 %
MCH RBC QN AUTO: 32.6 PG (ref 26–34)
MCHC RBC AUTO-ENTMCNC: 33.2 G/DL (ref 31–36)
MCV RBC AUTO: 98.2 FL (ref 80–100)
MONOCYTES ABSOLUTE: 0.3 K/UL (ref 0–1.3)
MONOCYTES RELATIVE PERCENT: 4.5 %
NEUTROPHILS ABSOLUTE: 4.3 K/UL (ref 1.7–7.7)
NEUTROPHILS RELATIVE PERCENT: 72.6 %
O2 SAT, VEN: 50 %
O2 THERAPY: NORMAL
PCO2, VEN: 41.3 MMHG (ref 40–50)
PDW BLD-RTO: 15.7 % (ref 12.4–15.4)
PERFORMED ON: ABNORMAL
PH VENOUS: 7.39 (ref 7.35–7.45)
PLATELET # BLD: 214 K/UL (ref 135–450)
PMV BLD AUTO: 8.3 FL (ref 5–10.5)
PO2, VEN: 27 MMHG (ref 25–40)
POTASSIUM REFLEX MAGNESIUM: 5.1 MMOL/L (ref 3.5–5.1)
RBC # BLD: 3.36 M/UL (ref 4–5.2)
SODIUM BLD-SCNC: 135 MMOL/L (ref 136–145)
TCO2 CALC VENOUS: 26 MMOL/L
TROPONIN: <0.01 NG/ML
WBC # BLD: 6 K/UL (ref 4–11)

## 2019-12-08 PROCEDURE — 96375 TX/PRO/DX INJ NEW DRUG ADDON: CPT

## 2019-12-08 PROCEDURE — 6370000000 HC RX 637 (ALT 250 FOR IP): Performed by: EMERGENCY MEDICINE

## 2019-12-08 PROCEDURE — 96376 TX/PRO/DX INJ SAME DRUG ADON: CPT

## 2019-12-08 PROCEDURE — 99285 EMERGENCY DEPT VISIT HI MDM: CPT

## 2019-12-08 PROCEDURE — 85025 COMPLETE CBC W/AUTO DIFF WBC: CPT

## 2019-12-08 PROCEDURE — 96374 THER/PROPH/DIAG INJ IV PUSH: CPT

## 2019-12-08 PROCEDURE — 6360000002 HC RX W HCPCS: Performed by: EMERGENCY MEDICINE

## 2019-12-08 PROCEDURE — 71046 X-RAY EXAM CHEST 2 VIEWS: CPT

## 2019-12-08 PROCEDURE — 93005 ELECTROCARDIOGRAM TRACING: CPT | Performed by: EMERGENCY MEDICINE

## 2019-12-08 PROCEDURE — 80048 BASIC METABOLIC PNL TOTAL CA: CPT

## 2019-12-08 PROCEDURE — 36415 COLL VENOUS BLD VENIPUNCTURE: CPT

## 2019-12-08 PROCEDURE — 82803 BLOOD GASES ANY COMBINATION: CPT

## 2019-12-08 PROCEDURE — 84484 ASSAY OF TROPONIN QUANT: CPT

## 2019-12-08 RX ORDER — MORPHINE SULFATE 4 MG/ML
4 INJECTION, SOLUTION INTRAMUSCULAR; INTRAVENOUS ONCE
Status: COMPLETED | OUTPATIENT
Start: 2019-12-08 | End: 2019-12-08

## 2019-12-08 RX ORDER — ONDANSETRON 2 MG/ML
4 INJECTION INTRAMUSCULAR; INTRAVENOUS ONCE
Status: COMPLETED | OUTPATIENT
Start: 2019-12-08 | End: 2019-12-08

## 2019-12-08 RX ADMIN — ASPIRIN 325 MG: 325 TABLET, DELAYED RELEASE ORAL at 16:28

## 2019-12-08 RX ADMIN — INSULIN HUMAN 20 UNITS: 100 INJECTION, SOLUTION PARENTERAL at 16:56

## 2019-12-08 RX ADMIN — MORPHINE SULFATE 4 MG: 4 INJECTION, SOLUTION INTRAMUSCULAR; INTRAVENOUS at 17:16

## 2019-12-08 RX ADMIN — NITROGLYCERIN 1 INCH: 20 OINTMENT TOPICAL at 16:26

## 2019-12-08 RX ADMIN — MORPHINE SULFATE 4 MG: 4 INJECTION, SOLUTION INTRAMUSCULAR; INTRAVENOUS at 18:53

## 2019-12-08 RX ADMIN — ONDANSETRON 4 MG: 2 INJECTION INTRAMUSCULAR; INTRAVENOUS at 17:16

## 2019-12-08 ASSESSMENT — ENCOUNTER SYMPTOMS
WHEEZING: 0
COUGH: 0
SHORTNESS OF BREATH: 0
DIARRHEA: 0
ABDOMINAL PAIN: 0
NAUSEA: 1
VOMITING: 0

## 2019-12-08 ASSESSMENT — PAIN DESCRIPTION - PAIN TYPE
TYPE: ACUTE PAIN

## 2019-12-08 ASSESSMENT — PAIN DESCRIPTION - DESCRIPTORS
DESCRIPTORS: HEAVINESS
DESCRIPTORS: PRESSURE

## 2019-12-08 ASSESSMENT — PAIN DESCRIPTION - ORIENTATION
ORIENTATION: LEFT

## 2019-12-08 ASSESSMENT — PAIN DESCRIPTION - LOCATION
LOCATION: CHEST

## 2019-12-08 ASSESSMENT — PAIN SCALES - GENERAL
PAINLEVEL_OUTOF10: 4
PAINLEVEL_OUTOF10: 6
PAINLEVEL_OUTOF10: 6
PAINLEVEL_OUTOF10: 10
PAINLEVEL_OUTOF10: 8

## 2019-12-08 ASSESSMENT — PAIN DESCRIPTION - FREQUENCY
FREQUENCY: CONTINUOUS

## 2019-12-08 ASSESSMENT — PAIN DESCRIPTION - PROGRESSION
CLINICAL_PROGRESSION: GRADUALLY WORSENING
CLINICAL_PROGRESSION: GRADUALLY IMPROVING
CLINICAL_PROGRESSION: NOT CHANGED

## 2019-12-08 ASSESSMENT — PAIN - FUNCTIONAL ASSESSMENT: PAIN_FUNCTIONAL_ASSESSMENT: 0-10

## 2019-12-08 ASSESSMENT — PAIN DESCRIPTION - ONSET
ONSET: GRADUAL

## 2019-12-09 LAB
EKG ATRIAL RATE: 77 BPM
EKG ATRIAL RATE: 89 BPM
EKG DIAGNOSIS: NORMAL
EKG DIAGNOSIS: NORMAL
EKG P AXIS: 49 DEGREES
EKG P AXIS: 78 DEGREES
EKG P-R INTERVAL: 122 MS
EKG P-R INTERVAL: 136 MS
EKG Q-T INTERVAL: 376 MS
EKG Q-T INTERVAL: 422 MS
EKG QRS DURATION: 84 MS
EKG QRS DURATION: 86 MS
EKG QTC CALCULATION (BAZETT): 457 MS
EKG QTC CALCULATION (BAZETT): 477 MS
EKG R AXIS: 46 DEGREES
EKG R AXIS: 53 DEGREES
EKG T AXIS: 73 DEGREES
EKG T AXIS: 98 DEGREES
EKG VENTRICULAR RATE: 77 BPM
EKG VENTRICULAR RATE: 89 BPM

## 2019-12-09 PROCEDURE — 93010 ELECTROCARDIOGRAM REPORT: CPT | Performed by: INTERNAL MEDICINE

## 2019-12-21 ENCOUNTER — TELEPHONE (OUTPATIENT)
Dept: PRIMARY CARE CLINIC | Age: 64
End: 2019-12-21

## 2019-12-23 ENCOUNTER — TELEPHONE (OUTPATIENT)
Dept: PAIN MANAGEMENT | Age: 64
End: 2019-12-23

## 2019-12-23 ENCOUNTER — TELEPHONE (OUTPATIENT)
Dept: PRIMARY CARE CLINIC | Age: 64
End: 2019-12-23

## 2019-12-23 RX ORDER — SULFAMETHOXAZOLE AND TRIMETHOPRIM 800; 160 MG/1; MG/1
1 TABLET ORAL 2 TIMES DAILY
Qty: 10 TABLET | Refills: 0 | Status: SHIPPED | OUTPATIENT
Start: 2019-12-23 | End: 2019-12-28

## 2019-12-23 RX ORDER — METHYLPREDNISOLONE 4 MG/1
TABLET ORAL
Qty: 1 KIT | Refills: 0 | Status: CANCELLED | OUTPATIENT
Start: 2019-12-23

## 2019-12-23 NOTE — TELEPHONE ENCOUNTER
Pt calling stating that the Percocet is not controlling the pain and would like something else for it. Patient is aware [facility] is out of office until Tuesday and her  issue will be addressed on that date. She wanted me to send over the message any ways.  I told her I would have PKA look at the message

## 2019-12-23 NOTE — TELEPHONE ENCOUNTER
Called patient to advise her that per PKA she can take a medrol dose pack. Patient did not answer, LVM for her to call back. I did not send the medrol pack to the pharmacy because I need to know which pharmacy she would like to use. The medrol pack is pending and just needs to be sent.

## 2020-01-08 ENCOUNTER — OFFICE VISIT (OUTPATIENT)
Dept: PAIN MANAGEMENT | Age: 65
End: 2020-01-08
Payer: COMMERCIAL

## 2020-01-08 VITALS
HEART RATE: 73 BPM | BODY MASS INDEX: 22.07 KG/M2 | WEIGHT: 113 LBS | SYSTOLIC BLOOD PRESSURE: 150 MMHG | DIASTOLIC BLOOD PRESSURE: 75 MMHG

## 2020-01-08 PROCEDURE — 2022F DILAT RTA XM EVC RTNOPTHY: CPT | Performed by: INTERNAL MEDICINE

## 2020-01-08 PROCEDURE — G8482 FLU IMMUNIZE ORDER/ADMIN: HCPCS | Performed by: INTERNAL MEDICINE

## 2020-01-08 PROCEDURE — 3017F COLORECTAL CA SCREEN DOC REV: CPT | Performed by: INTERNAL MEDICINE

## 2020-01-08 PROCEDURE — 3046F HEMOGLOBIN A1C LEVEL >9.0%: CPT | Performed by: INTERNAL MEDICINE

## 2020-01-08 PROCEDURE — G8427 DOCREV CUR MEDS BY ELIG CLIN: HCPCS | Performed by: INTERNAL MEDICINE

## 2020-01-08 PROCEDURE — 99213 OFFICE O/P EST LOW 20 MIN: CPT | Performed by: INTERNAL MEDICINE

## 2020-01-08 PROCEDURE — G8420 CALC BMI NORM PARAMETERS: HCPCS | Performed by: INTERNAL MEDICINE

## 2020-01-08 PROCEDURE — 1036F TOBACCO NON-USER: CPT | Performed by: INTERNAL MEDICINE

## 2020-01-08 RX ORDER — OXYCODONE AND ACETAMINOPHEN 7.5; 325 MG/1; MG/1
1 TABLET ORAL EVERY 6 HOURS PRN
Qty: 84 TABLET | Refills: 0 | Status: SHIPPED | OUTPATIENT
Start: 2020-01-08 | End: 2020-02-05 | Stop reason: SDUPTHER

## 2020-01-08 RX ORDER — DULOXETIN HYDROCHLORIDE 60 MG/1
60 CAPSULE, DELAYED RELEASE ORAL DAILY
Qty: 30 CAPSULE | Refills: 1 | Status: SHIPPED | OUTPATIENT
Start: 2020-01-08 | End: 2020-02-05 | Stop reason: SDUPTHER

## 2020-01-08 RX ORDER — TOPIRAMATE 100 MG/1
100 TABLET, FILM COATED ORAL 2 TIMES DAILY
Qty: 60 TABLET | Refills: 1 | Status: SHIPPED | OUTPATIENT
Start: 2020-01-08 | End: 2020-02-05 | Stop reason: SDUPTHER

## 2020-01-08 NOTE — PROGRESS NOTES
Sathya Radhacarl  1955  [id number]    HISTORY OF PRESENT ILLNESS:  Ms. Francisca Alfonso is a 62 y.o. female returns for a follow up visit for multiple medical problems. Her current presenting problems are   1. Chronic pain syndrome    2. Fibromyalgia    3. Intractable migraine with aura without status migrainosus    4. Chronic painful diabetic neuropathy ([address]    5. DDD (degenerative disc disease), cervical    6. Moderate episode of recurrent major depressive disorder ([address]    7. Gastro-esophageal reflux disease without esophagitis    8. Primary insomnia    . As per information/history obtained from the PADT(patient assessment and documentation tool) - She complains of pain in the neck and lower back with radiation to the shoulders Bilateral, arms Bilateral, elbows Bilateral, hands Bilateral, buttocks, hips Bilateral, upper leg Bilateral, knees Bilateral, lower leg Bilateral, ankles Bilateral and feet Bilateral She rates the pain 9/10 and describes it as aching. Pain is made worse by: movement, walking, standing, sitting, bending, lifting. Current treatment regimen has helped relieve about 10% of the pain. She denies side effects from the current pain regimen. Patient reports that since the last follow up visit the physical functioning is worse, family/social relationships are worse, mood is worse and sleep patterns are worse, and that the overall functioning is worse. Patient denies neurological bowel or bladder. Patient denies misusing/abusing her narcotic pain medications or using any illegal drugs. There are No indicators for possible drug abuse, addiction or diversion problems. Upon obtaining the medical history from Ms. Francisca Alfonso regarding today's office visit for her presenting problems,Patient states she was in the the hospital for chest pain, had a angiogram and stent put in. I'm not sure if she is complaint with her medications. She says she is using Percocet 3 per day.  She complains her whole body monitor progress towards achieving/maintaining therapeutic goals with special emphasis on  1. Improvement in perceived interfernce  of pain with ADL's. Ability to do home exercises independently. Ability to do household chores indoor and/or outdoor work and social and leisure activities. Improve psychosocial and physical functioning. - she is showing progression towards this treatment goal with the current regimen. She was advised against drinking alcohol with the narcotic pain medicines, advised against driving or handling machinery while adjusting the dose of medicines or if having cognitive  issues related to the current medications. Risk of overdose and death, if medicines not taken as prescribed, were also discussed. If the patient develops new symptoms or if the symptoms worsen, the patient should call the office. While transcribing every attempt was made to maintain the accuracy of the note in terms of it's contents,there may have been some errors made inadvertently. Thank you for allowing me to participate in the care of this patient. Nicholas Kemp MD.    Cc: Dania Cervantes MD    I, Angella Mcdermott, am scribing for and in the presence of Dr. Nicholas Kemp.    01/08/20  1:41 PM  Angella Mcdermott MA    I, Dr. Nicholas Kemp, personally performed the services described in this documentation as scribed by   Angella Mcdermott MA in my presence and it is both accurate and complete

## 2020-01-13 ENCOUNTER — TELEPHONE (OUTPATIENT)
Dept: PRIMARY CARE CLINIC | Age: 65
End: 2020-01-13

## 2020-01-14 ENCOUNTER — OFFICE VISIT (OUTPATIENT)
Dept: PRIMARY CARE CLINIC | Age: 65
End: 2020-01-14
Payer: COMMERCIAL

## 2020-01-14 VITALS
BODY MASS INDEX: 23.36 KG/M2 | DIASTOLIC BLOOD PRESSURE: 65 MMHG | HEART RATE: 70 BPM | WEIGHT: 119 LBS | SYSTOLIC BLOOD PRESSURE: 125 MMHG | HEIGHT: 60 IN

## 2020-01-14 PROCEDURE — G8482 FLU IMMUNIZE ORDER/ADMIN: HCPCS | Performed by: INTERNAL MEDICINE

## 2020-01-14 PROCEDURE — G8420 CALC BMI NORM PARAMETERS: HCPCS | Performed by: INTERNAL MEDICINE

## 2020-01-14 PROCEDURE — 99213 OFFICE O/P EST LOW 20 MIN: CPT | Performed by: INTERNAL MEDICINE

## 2020-01-14 PROCEDURE — G8427 DOCREV CUR MEDS BY ELIG CLIN: HCPCS | Performed by: INTERNAL MEDICINE

## 2020-01-14 PROCEDURE — 1036F TOBACCO NON-USER: CPT | Performed by: INTERNAL MEDICINE

## 2020-01-14 PROCEDURE — 3017F COLORECTAL CA SCREEN DOC REV: CPT | Performed by: INTERNAL MEDICINE

## 2020-01-14 RX ORDER — DIPHENOXYLATE HYDROCHLORIDE AND ATROPINE SULFATE 2.5; .025 MG/1; MG/1
1 TABLET ORAL 2 TIMES DAILY PRN
Qty: 10 TABLET | Refills: 0 | Status: SHIPPED | OUTPATIENT
Start: 2020-01-14 | End: 2020-01-19

## 2020-01-14 ASSESSMENT — ENCOUNTER SYMPTOMS
CHEST TIGHTNESS: 0
ABDOMINAL PAIN: 0
ABDOMINAL DISTENTION: 0
NAUSEA: 0
DIARRHEA: 1
VOMITING: 0
BLOOD IN STOOL: 0
CONSTIPATION: 0
BACK PAIN: 1
EYES NEGATIVE: 1

## 2020-01-14 NOTE — PROGRESS NOTES
Heart sounds: Normal heart sounds. Pulmonary:      Breath sounds: No wheezing. Abdominal:      General: There is no distension. Palpations: Abdomen is soft. There is no mass. Tenderness: There is no tenderness. There is no guarding or rebound. Musculoskeletal: Normal range of motion. General: No tenderness. Comments: Foot exam 8/19      Skin:     General: Skin is warm and dry. Neurological:      Mental Status: She is oriented to person, place, and time. Psychiatric:         Mood and Affect: Mood is depressed. Assessment:        Maren was seen today for diarrhea. Diagnoses and all orders for this visit:    Diarrhea, unspecified type  -     C DIFF TOXIN/ANTIGEN; Future  -     Fecal Leukocytes; Future  -     diphenoxylate-atropine (DIPHENATOL) 2.5-0.025 MG per tablet; Take 1 tablet by mouth 2 times daily as needed for Diarrhea for up to 5 days. Renal insufficiency  Stable . Type 2 diabetes mellitus with complication, with long-term current use of insulin (HCC)  -     insulin glargine (BASAGLAR KWIKPEN) 100 UNIT/ML injection pen; Inject 30 Units into the skin nightly  -     Dulaglutide 0.75 MG/0.5ML SOPN; Inject 0.75 mg into the skin once a week  -     atorvastatin (LIPITOR) 80 MG tablet; Take 1 tablet by mouth daily  -     aspirin (ASPIRIN LOW DOSE) 81 MG EC tablet; Take 1 tablet by mouth daily    Essential hypertension controlled   -     Hydralazine 50 mg bid   amLODIPine (NORVASC) 5 MG tablet; Take 1 tablet by mouth daily  -     Damadex 10 mg / d   -     omeprazole (PRILOSEC) 20 MG delayed release capsule; Take 1 capsule by mouth Daily  -     metoprolol succinate (TOPROL XL) 50 MG extended release tablet; Take 0.5 tablets by mouth daily  -     isosorbide mononitrate (IMDUR) 30 MG extended release tablet; Take 1 tablet by mouth daily    Neuropathy    Chronic pain syndrome  -     gabapentin (NEURONTIN) 300 MG capsule;  Take 1 capsule by mouth 3 times daily as

## 2020-01-17 ENCOUNTER — TELEPHONE (OUTPATIENT)
Dept: PRIMARY CARE CLINIC | Age: 65
End: 2020-01-17

## 2020-01-17 RX ORDER — OMEPRAZOLE 20 MG/1
20 CAPSULE, DELAYED RELEASE ORAL DAILY
Qty: 30 CAPSULE | Refills: 1 | Status: SHIPPED | OUTPATIENT
Start: 2020-01-17 | End: 2020-03-18

## 2020-01-17 NOTE — LETTER
[facility]  [address] [address]  Phone: [phone]  Fax: [phone]    Maycol Mcdowell MD        January 17, 2020    [address]  [facility] [place] [address] [facility] [address]      To whom this may concern: The above name patient has a small dog that is used as her emotional support animal to help control/minimize her depression. If you have any questions or concerns, please don't hesitate to call.     Sincerely,        Maycol Mcdowell MD

## 2020-01-21 ENCOUNTER — TELEPHONE (OUTPATIENT)
Dept: PRIMARY CARE CLINIC | Age: 65
End: 2020-01-21

## 2020-01-23 RX ORDER — BUPROPION HYDROCHLORIDE 150 MG/1
150 TABLET ORAL EVERY MORNING
Qty: 30 TABLET | Refills: 1 | OUTPATIENT
Start: 2020-01-23

## 2020-01-24 PROBLEM — N17.9 ACUTE-ON-CHRONIC RENAL FAILURE (HCC): Status: ACTIVE | Noted: 2019-12-09

## 2020-01-24 PROBLEM — E11.319 DIABETIC RETINOPATHY OF BOTH EYES WITHOUT MACULAR EDEMA ASSOCIATED WITH TYPE 2 DIABETES MELLITUS (HCC): Status: ACTIVE | Noted: 2017-05-05

## 2020-01-24 PROBLEM — T82.855A CORONARY STENT RESTENOSIS DUE TO PROGRESSION OF DISEASE: Status: ACTIVE | Noted: 2019-10-03

## 2020-01-24 PROBLEM — F11.20 UNCOMPLICATED OPIOID DEPENDENCE (HCC): Status: ACTIVE | Noted: 2017-05-05

## 2020-01-24 PROBLEM — I82.211: Status: ACTIVE | Noted: 2020-01-24

## 2020-01-24 PROBLEM — H25.093 OTHER AGE-RELATED INCIPIENT CATARACT, BILATERAL: Status: ACTIVE | Noted: 2020-01-24

## 2020-01-24 PROBLEM — I25.10 CORONARY STENT RESTENOSIS DUE TO PROGRESSION OF DISEASE: Status: ACTIVE | Noted: 2019-10-03

## 2020-01-24 PROBLEM — E11.3293 TYPE 2 DIABETES MELLITUS WITH MILD NONPROLIFERATIVE DIABETIC RETINOPATHY WITHOUT MACULAR EDEMA, BILATERAL (HCC): Status: ACTIVE | Noted: 2020-01-24

## 2020-01-24 PROBLEM — Z51.5 PALLIATIVE CARE ENCOUNTER: Status: ACTIVE | Noted: 2020-01-24

## 2020-01-24 PROBLEM — H25.13 AGE-RELATED NUCLEAR CATARACT, BILATERAL: Status: ACTIVE | Noted: 2020-01-24

## 2020-01-24 PROBLEM — H52.223 REGULAR ASTIGMATISM, BILATERAL: Status: ACTIVE | Noted: 2020-01-24

## 2020-01-24 PROBLEM — Z96.1 PRESENCE OF INTRAOCULAR LENS: Status: ACTIVE | Noted: 2020-01-24

## 2020-01-24 PROBLEM — I13.0 HYPERTENSIVE HEART AND CHRONIC KIDNEY DISEASE WITH HEART FAILURE AND STAGE 1 THROUGH STAGE 4 CHRONIC KIDNEY DISEASE, OR UNSPECIFIED CHRONIC KIDNEY DISEASE (HCC): Status: ACTIVE | Noted: 2017-05-05

## 2020-01-24 PROBLEM — N18.30 CKD (CHRONIC KIDNEY DISEASE) STAGE 3, GFR 30-59 ML/MIN (HCC): Status: ACTIVE | Noted: 2019-10-03

## 2020-01-24 PROBLEM — F33.1 MODERATE EPISODE OF RECURRENT MAJOR DEPRESSIVE DISORDER (HCC): Status: ACTIVE | Noted: 2017-05-05

## 2020-01-24 PROBLEM — F32.1 CURRENT MODERATE EPISODE OF MAJOR DEPRESSIVE DISORDER (HCC): Status: ACTIVE | Noted: 2020-01-24

## 2020-01-24 PROBLEM — N18.9 ACUTE-ON-CHRONIC RENAL FAILURE (HCC): Status: ACTIVE | Noted: 2019-12-09

## 2020-01-24 PROBLEM — Z79.891 CHRONIC PRESCRIPTION OPIATE USE: Status: ACTIVE | Noted: 2018-06-29

## 2020-01-24 PROBLEM — I25.5 ISCHEMIC CARDIOMYOPATHY: Status: ACTIVE | Noted: 2019-10-03

## 2020-01-24 PROBLEM — H35.033 HYPERTENSIVE RETINOPATHY, BILATERAL: Status: ACTIVE | Noted: 2020-01-24

## 2020-01-24 PROBLEM — H16.143 PUNCTATE KERATITIS, BILATERAL: Status: ACTIVE | Noted: 2020-01-24

## 2020-01-28 ENCOUNTER — TELEPHONE (OUTPATIENT)
Dept: PRIMARY CARE CLINIC | Age: 65
End: 2020-01-28

## 2020-01-28 RX ORDER — INSULIN GLARGINE 100 [IU]/ML
INJECTION, SOLUTION SUBCUTANEOUS
Qty: 15 ML | Refills: 5 | Status: SHIPPED | OUTPATIENT
Start: 2020-01-28 | End: 2020-10-01 | Stop reason: SDUPTHER

## 2020-01-28 RX ORDER — LOPERAMIDE HYDROCHLORIDE 2 MG/1
2 CAPSULE ORAL 2 TIMES DAILY PRN
Qty: 20 CAPSULE | Refills: 0 | Status: SHIPPED | OUTPATIENT
Start: 2020-01-28 | End: 2020-02-07

## 2020-02-05 ENCOUNTER — OFFICE VISIT (OUTPATIENT)
Dept: PAIN MANAGEMENT | Age: 65
End: 2020-02-05
Payer: COMMERCIAL

## 2020-02-05 VITALS
DIASTOLIC BLOOD PRESSURE: 82 MMHG | WEIGHT: 119 LBS | BODY MASS INDEX: 23.24 KG/M2 | HEART RATE: 82 BPM | SYSTOLIC BLOOD PRESSURE: 153 MMHG

## 2020-02-05 PROCEDURE — G8420 CALC BMI NORM PARAMETERS: HCPCS | Performed by: INTERNAL MEDICINE

## 2020-02-05 PROCEDURE — G8482 FLU IMMUNIZE ORDER/ADMIN: HCPCS | Performed by: INTERNAL MEDICINE

## 2020-02-05 PROCEDURE — 2022F DILAT RTA XM EVC RTNOPTHY: CPT | Performed by: INTERNAL MEDICINE

## 2020-02-05 PROCEDURE — G8427 DOCREV CUR MEDS BY ELIG CLIN: HCPCS | Performed by: INTERNAL MEDICINE

## 2020-02-05 PROCEDURE — 1036F TOBACCO NON-USER: CPT | Performed by: INTERNAL MEDICINE

## 2020-02-05 PROCEDURE — 3017F COLORECTAL CA SCREEN DOC REV: CPT | Performed by: INTERNAL MEDICINE

## 2020-02-05 PROCEDURE — 99214 OFFICE O/P EST MOD 30 MIN: CPT | Performed by: INTERNAL MEDICINE

## 2020-02-05 PROCEDURE — 3046F HEMOGLOBIN A1C LEVEL >9.0%: CPT | Performed by: INTERNAL MEDICINE

## 2020-02-05 RX ORDER — TOPIRAMATE 100 MG/1
100 TABLET, FILM COATED ORAL 2 TIMES DAILY
Qty: 60 TABLET | Refills: 0 | Status: SHIPPED | OUTPATIENT
Start: 2020-02-05 | End: 2020-03-04 | Stop reason: SDUPTHER

## 2020-02-05 RX ORDER — OXYCODONE AND ACETAMINOPHEN 7.5; 325 MG/1; MG/1
1 TABLET ORAL EVERY 6 HOURS PRN
Qty: 84 TABLET | Refills: 0 | Status: SHIPPED | OUTPATIENT
Start: 2020-02-05 | End: 2020-03-04 | Stop reason: SDUPTHER

## 2020-02-05 RX ORDER — TRAZODONE HYDROCHLORIDE 50 MG/1
50-100 TABLET ORAL NIGHTLY
Qty: 60 TABLET | Refills: 0 | Status: SHIPPED
Start: 2020-02-05 | End: 2020-03-04 | Stop reason: ALTCHOICE

## 2020-02-05 RX ORDER — METHOCARBAMOL 500 MG/1
500 TABLET, FILM COATED ORAL 2 TIMES DAILY
Qty: 60 TABLET | Refills: 0 | Status: SHIPPED | OUTPATIENT
Start: 2020-02-05 | End: 2020-03-04 | Stop reason: SDUPTHER

## 2020-02-05 RX ORDER — DULOXETIN HYDROCHLORIDE 60 MG/1
60 CAPSULE, DELAYED RELEASE ORAL DAILY
Qty: 30 CAPSULE | Refills: 0 | Status: SHIPPED | OUTPATIENT
Start: 2020-02-05 | End: 2020-03-04 | Stop reason: SDUPTHER

## 2020-02-05 NOTE — PROGRESS NOTES
[facility]  1955  [id number]    HISTORY OF PRESENT ILLNESS:  Ms. Americo Torrez is a 62 y.o. female returns for a follow up visit for multiple medical problems. Her current presenting problems are   1. Chronic pain syndrome    2. Fibromyalgia    3. Intractable migraine with aura without status migrainosus    4. Chronic painful diabetic neuropathy ([facility] [address]    5. DDD (degenerative disc disease), cervical    6. Moderate episode of recurrent major depressive disorder ([facility] [address]    7. Gastro-esophageal reflux disease without esophagitis    8. Primary insomnia    9. Tendinitis of right rotator cuff    . As per information/history obtained from the PADT(patient assessment and documentation tool) - She complains of pain in the neck, shoulders Right, elbows Right, upper back, mid back, lower back and knees Right with radiation to the arms Right, hands Right, buttocks, hips Right, upper leg Right, lower leg Right, ankles Right and feet Right She rates the pain 9/10 and describes it as aching, burning, throbbing, numbness. Pain is made worse by: nothing, movement, walking, standing, sitting, bending, lifting. Current treatment regimen has helped relieve about 10% of the pain. She denies side effects from the current pain regimen. Patient reports that since the last follow up visit the physical functioning is unchanged, family/social relationships are unchanged, mood is unchanged and sleep patterns are unchanged, and that the overall functioning is unchanged. Patient denies neurological bowel or bladder. Patient denies misusing/abusing her narcotic pain medications or using any illegal drugs. There are No indicators for possible drug abuse, addiction or diversion problems. Upon obtaining the medical history from Ms. Meza regarding today's office visit for her presenting problems, Ms. Americo Torrez states her muscles hurts, backs hurts, the whole body hurts. She says the back of her neck going all the way down the spine. Patient states it has been going on for 2 weeks, \"really painful. \" She reports her headache symptoms are there but not as bad. Patient denies any A/I/T. Sleep is poor,  poor sleep latency, averages 2-3 hours of sleep at night. Intermittent, non restorative. Does not feel rested in AM. Complains of feeling sleepy  during the day. Patient's  subjective report of her mood is fair. she describes occasional symptoms of depression, occasional  irritability and some mood swings. Describes her mood as being neutral and reports some pleasure in her daily activities. Reports  fair  appetite, energy and concentration. Able to function well in different aspects of her daily activities. Denies suicidal or homicidal ideation. Denies any complaints of increased tension, does   Worry sometimes and occasional  irritability  she denies any c/o increased anxiety, No c/o panic attacks or symptoms of PTSD. She says it hurts to touch the skin. She reports she is managing very little house work, she has a aide helping her. ALLERGIES/PAST MED/FAM/SOC HISTORY: Ms. Dang Kathleen allergies, past medical, family and social history were reviewed in the chart and also listed below. Social History     Socioeconomic History    Marital status:       Spouse name: Not on file    Number of children: 6    Years of education: Not on file    Highest education level: Not on file   Occupational History    Not on file   Social Needs    Financial resource strain: Not on file    Food insecurity:     Worry: Not on file     Inability: Not on file    Transportation needs:     Medical: Not on file     Non-medical: Not on file   Tobacco Use    Smoking status: Former Smoker     Packs/day: 0.50     Years: 35.00     Pack years: 17.50     Types: Cigarettes     Last attempt to quit: 2018     Years since quittin.6    Smokeless tobacco: Never Used    Tobacco comment: 5/13/15 has not smoked since hospitalization -    Substance and Sexual Activity    Alcohol use: No     Alcohol/week: 0.0 standard drinks    Drug use: No    Sexual activity: Yes     Partners: Male   Lifestyle    Physical activity:     Days per week: Not on file     Minutes per session: Not on file    Stress: Not on file   Relationships    Social connections:     Talks on phone: Not on file     Gets together: Not on file     Attends [employer] service: Not on file     Active member of club or organization: Not on file     Attends meetings of clubs or organizations: Not on file     Relationship status: Not on file    Intimate partner violence:     Fear of current or ex partner: Not on file     Emotionally abused: Not on file     Physically abused: Not on file     Forced sexual activity: Not on file   Other Topics Concern    Not on file   Social History Narrative    Not on file       Ms. Meza current medications are   Outpatient Medications Prior to Visit   Medication Sig Dispense Refill    loperamide (RA ANTI-DIARRHEAL) 2 MG capsule Take 1 capsule by mouth 2 times daily as needed for Diarrhea 20 capsule 0    BASAGLAR KWIKPEN 100 UNIT/ML injection pen INJECT 60 UNITS INTO THE SKIN 2 TIMES DAILY 15 mL 5    omeprazole (PRILOSEC) 20 MG delayed release capsule TAKE 1 CAPSULE BY MOUTH DAILY 30 capsule 1    DULoxetine (CYMBALTA) 60 MG extended release capsule Take 1 capsule by mouth daily 30 capsule 1    topiramate (TOPAMAX) 100 MG tablet Take 1 tablet by mouth 2 times daily 60 tablet 1    oxyCODONE-acetaminophen (PERCOCET) 7.5-325 MG per tablet Take 1 tablet by mouth every 6 hours as needed for Pain (max 3/day) for up to 28 days.  84 tablet 0    budesonide-formoterol (SYMBICORT) 160-4.5 MCG/ACT AERO Inhale 2 puffs into the lungs daily 2 Inhaler 5    albuterol sulfate HFA (VENTOLIN HFA) 108 (90 Base) MCG/ACT inhaler Inhale 2 puffs into the lungs every 4 hours as needed for Wheezing or Shortness of Breath 3 Inhaler 5    torsemide (DEMADEX) 10 MG tablet TAKE TWO TABLETS BY MOUTH MOUTH DAILY 30 capsule 1    DULoxetine (CYMBALTA) 60 MG extended release capsule Take 1 capsule by mouth daily 30 capsule 1    topiramate (TOPAMAX) 100 MG tablet Take 1 tablet by mouth 2 times daily 60 tablet 1    oxyCODONE-acetaminophen (PERCOCET) 7.5-325 MG per tablet Take 1 tablet by mouth every 6 hours as needed for Pain (max 3/day) for up to 28 days. 84 tablet 0    budesonide-formoterol (SYMBICORT) 160-4.5 MCG/ACT AERO Inhale 2 puffs into the lungs daily 2 Inhaler 5    albuterol sulfate HFA (VENTOLIN HFA) 108 (90 Base) MCG/ACT inhaler Inhale 2 puffs into the lungs every 4 hours as needed for Wheezing or Shortness of Breath 3 Inhaler 5    torsemide (DEMADEX) 10 MG tablet TAKE TWO TABLETS BY MOUTH DAILY 60 tablet 5    hydrALAZINE (APRESOLINE) 50 MG tablet TAKE 1 TABLET BY MOUTH 2 TIMES DAILY 60 tablet 5    ranolazine (RANEXA) 1000 MG extended release tablet Take 1 tablet by mouth 2 times daily 60 tablet 8    clopidogrel (PLAVIX) 75 MG tablet TAKE 1 TABLET BY MOUTH DA JULIEN 90 tablet 5    metoprolol succinate (TOPROL XL) 50 MG extended release tablet Take 0.5 tablets by mouth 2 times daily (with meals) 30 tablet 5    amLODIPine (NORVASC) 5 MG tablet Take 1 tablet by mouth daily 90 tablet 5    atorvastatin (LIPITOR) 80 MG tablet Take 1 tablet by mouth daily 90 tablet 3    aspirin (ASPIRIN LOW DOSE) 81 MG EC tablet Take 1 tablet by mouth daily 90 tablet 5    isosorbide mononitrate (IMDUR) 30 MG extended release tablet Take 1 tablet by mouth daily 90 tablet 5    nitroGLYCERIN (NITROSTAT) 0.4 MG SL tablet Place 1 tablet under the tongue every 5 minutes as needed for Chest pain up to max of 3 total doses. If no relief after 1 dose, call 911. 25 tablet 3     No current facility-administered medications for this visit.          Goals of current treatment regimen include improvement in pain, restoration of functioning- with focus on improvement in physical performance, general activity, work or disability,emotional distress, health care utilization and  decreased medication consumption. Will continue to monitor progress towards achieving/maintaining therapeutic goals with special emphasis on  1. Improvement in perceived interfernce  of pain with ADL's. Ability to do home exercises independently. Ability to do household chores indoor and/or outdoor work and social and leisure activities. To increase flexibility/ROM, strength and endurance. Improve psychosocial and physical functioning.- she is not showing any significant progress/or showing regression  towards this goal and reassessment and adjustment of goals/treatment have been made. 2. Improving sleep to 6-7 hours a night. Improve mood/ anxiety and depression symptoms such as crying spells, low energy, problems with concentration, motivation. - she is showing progression towards this treatment goal with the current regimen. 3. Reduction of reliance on opioid analgesia/more appropriate opioid use. - she is showing progression towards this treatment goal with the current regimen. Risks and benefits of the medications and other alternative treatments have been/were  discussed with the patient. Any questions on the  common side effects of these medications were also answered. She was advised against drinking alcohol with the narcotic pain medicines, advised against driving or handling machinery when  starting or adjusting the dose of medicines, feeling groggy or drowsy, or if having any cognitive issues related to the current medications. Sheis fully aware of the risk of overdose and death, if medicines are misused and not taken as prescribed. If she develops new symptoms or if the symptoms worsen, she was told to call the office. .  Thank you for allowing me to participate in the care of this patient. Dorothy Harley MD.    Cc: Alise Goldberg MD    I, Chitra Booth, scribing for in the presence  of Dr. Dorothy Harley.    02/05/20  10:03 AM  Kinga Spears.  Ksenia Bergeron Assistant   I, Dr. Micaela Sharpe, personally performed the services described in this documentation as scribed by  Misael Márquez MA in my presence and it is both accurate and complete

## 2020-02-10 ENCOUNTER — TELEPHONE (OUTPATIENT)
Dept: PAIN MANAGEMENT | Age: 65
End: 2020-02-10

## 2020-02-10 NOTE — TELEPHONE ENCOUNTER
Patient states she was seen in the ER at [address] [place] [address] and admitted for chest pain and discharged of next day. Patient states she was told by the cardiologist to ask [facility] if fibromyalgia can cause chest pain. Pl's advise.

## 2020-02-11 ENCOUNTER — TELEPHONE (OUTPATIENT)
Dept: PRIMARY CARE CLINIC | Age: 65
End: 2020-02-11

## 2020-02-11 NOTE — TELEPHONE ENCOUNTER
Called patient to advise her that per [facility] yes it can cause chest pains. Patient voiced her understanding.

## 2020-02-19 RX ORDER — PEN NEEDLE, DIABETIC 31 GX5/16"
NEEDLE, DISPOSABLE MISCELLANEOUS
Qty: 100 EACH | Refills: 5 | Status: SHIPPED | OUTPATIENT
Start: 2020-02-19 | End: 2021-03-01

## 2020-02-24 ENCOUNTER — TELEPHONE (OUTPATIENT)
Dept: PRIMARY CARE CLINIC | Age: 65
End: 2020-02-24

## 2020-02-24 NOTE — TELEPHONE ENCOUNTER
[facility]   fax is  requesting script for diabetic supplies and pain script .  And dry skin call them back

## 2020-02-28 NOTE — TELEPHONE ENCOUNTER
[facility] calling to f/u on fax request for calcipotriene 0.005% dry skin cream. Please send or call back at [phone]

## 2020-02-28 NOTE — TELEPHONE ENCOUNTER
Called and spoke to pt and she states that she has not authorized [facility] to ask for refills for her

## 2020-03-02 NOTE — TELEPHONE ENCOUNTER
Pt called asking for a refill for the Antidiarrhea medication she was prescribed in her last visit with Dr. Rosie Schmitz. Pt does not know the name of medication. Pt also asking if she should see Dr. Kirstin Bray for the diarrhea.

## 2020-03-04 ENCOUNTER — OFFICE VISIT (OUTPATIENT)
Dept: PAIN MANAGEMENT | Age: 65
End: 2020-03-04
Payer: COMMERCIAL

## 2020-03-04 VITALS
BODY MASS INDEX: 22.65 KG/M2 | WEIGHT: 116 LBS | DIASTOLIC BLOOD PRESSURE: 75 MMHG | HEART RATE: 80 BPM | SYSTOLIC BLOOD PRESSURE: 121 MMHG

## 2020-03-04 PROCEDURE — G8427 DOCREV CUR MEDS BY ELIG CLIN: HCPCS | Performed by: INTERNAL MEDICINE

## 2020-03-04 PROCEDURE — 1036F TOBACCO NON-USER: CPT | Performed by: INTERNAL MEDICINE

## 2020-03-04 PROCEDURE — 3046F HEMOGLOBIN A1C LEVEL >9.0%: CPT | Performed by: INTERNAL MEDICINE

## 2020-03-04 PROCEDURE — 2022F DILAT RTA XM EVC RTNOPTHY: CPT | Performed by: INTERNAL MEDICINE

## 2020-03-04 PROCEDURE — 99214 OFFICE O/P EST MOD 30 MIN: CPT | Performed by: INTERNAL MEDICINE

## 2020-03-04 PROCEDURE — 3017F COLORECTAL CA SCREEN DOC REV: CPT | Performed by: INTERNAL MEDICINE

## 2020-03-04 PROCEDURE — G8482 FLU IMMUNIZE ORDER/ADMIN: HCPCS | Performed by: INTERNAL MEDICINE

## 2020-03-04 PROCEDURE — G8420 CALC BMI NORM PARAMETERS: HCPCS | Performed by: INTERNAL MEDICINE

## 2020-03-04 RX ORDER — TOPIRAMATE 100 MG/1
100 TABLET, FILM COATED ORAL 2 TIMES DAILY
Qty: 60 TABLET | Refills: 0 | Status: SHIPPED | OUTPATIENT
Start: 2020-03-04 | End: 2020-04-01 | Stop reason: SDUPTHER

## 2020-03-04 RX ORDER — METHOCARBAMOL 500 MG/1
500 TABLET, FILM COATED ORAL 2 TIMES DAILY
Qty: 60 TABLET | Refills: 0 | Status: SHIPPED | OUTPATIENT
Start: 2020-03-04 | End: 2020-04-01 | Stop reason: SDUPTHER

## 2020-03-04 RX ORDER — OXYCODONE AND ACETAMINOPHEN 7.5; 325 MG/1; MG/1
1 TABLET ORAL EVERY 6 HOURS PRN
Qty: 84 TABLET | Refills: 0 | Status: SHIPPED | OUTPATIENT
Start: 2020-03-04 | End: 2020-04-01 | Stop reason: SDUPTHER

## 2020-03-04 RX ORDER — GABAPENTIN 300 MG/1
CAPSULE ORAL
Qty: 90 CAPSULE | Refills: 3 | Status: SHIPPED | OUTPATIENT
Start: 2020-03-04 | End: 2020-04-01 | Stop reason: SDUPTHER

## 2020-03-04 RX ORDER — DULOXETIN HYDROCHLORIDE 60 MG/1
60 CAPSULE, DELAYED RELEASE ORAL DAILY
Qty: 30 CAPSULE | Refills: 0 | Status: SHIPPED | OUTPATIENT
Start: 2020-03-04 | End: 2020-04-01 | Stop reason: SDUPTHER

## 2020-03-04 RX ORDER — QUETIAPINE FUMARATE 25 MG/1
25-50 TABLET, FILM COATED ORAL NIGHTLY
Qty: 60 TABLET | Refills: 0 | Status: SHIPPED | OUTPATIENT
Start: 2020-03-04 | End: 2020-03-31

## 2020-03-04 NOTE — PROGRESS NOTES
Chloe Sheppard  1955  [id number]    HISTORY OF PRESENT ILLNESS:  Ms. Rosas Lloyd is a 62 y.o. female returns for a follow up visit for multiple medical problems. Her current presenting problems are   1. Chronic pain syndrome    2. Primary insomnia    3. Fibromyalgia    4. Intractable migraine with aura without status migrainosus    5. Chronic painful diabetic neuropathy ([facility]    6. DDD (degenerative disc disease), cervical    7. Moderate episode of recurrent major depressive disorder ([facility] [address]    8. Gastro-esophageal reflux disease without esophagitis    9. Rotator cuff tendonitis, right    . As per information/history obtained from the PADT(patient assessment and documentation tool) - She complains of pain in the neck, shoulders Bilateral, upper back, mid back, lower back and knees Bilateral with radiation to the arms Bilateral, hands Bilateral, buttocks, hips Bilateral, upper leg Bilateral and lower leg Bilateral She rates the pain 9/10 and describes it as burning, numbness, pins and needles. Pain is made worse by: nothing, movement, walking, standing, sitting, bending, lifting. Current treatment regimen has helped relieve about 10% of the pain. She denies side effects from the current pain regimen. Patient reports that since the last follow up visit the physical functioning is unchanged, family/social relationships are unchanged, mood is unchanged and sleep patterns are unchanged, and that the overall functioning is unchanged. Patient denies neurological bowel or bladder. Patient denies misusing/abusing her narcotic pain medications or using any illegal drugs. There are No indicators for possible drug abuse, addiction or diversion problems. Upon obtaining the medical history from Ms. Rosas Lloyd regarding today's office visit for her presenting problems, Ms. Rosas Lloyd states she is having pains in the hip right side going down into the leg, she is having a lot of tingling in the legs.  She states she is having issues with unstable angina, she is seeing a Cardiology. Patient reports she has had 6 trips to the ER in the last 3-4 weeks. Patient states her sleep is fair. Has fairly normal sleep latency. Averages about 4-6 hours of sleep a night. Denies any signs of sleep apnea. Feels somewhat rested in the morning, Trazodone does not help much. Patient's  subjective report of her mood is fair. she describes occasional symptoms of depression, occasional  irritability and some mood swings. Describes her mood as being neutral and reports some pleasure in her daily activities. Reports  fair  appetite, energy and concentration. Able to function well in different aspects of her daily activities. Denies suicidal or homicidal ideation. Denies any complaints of increased tension, does   Worry sometimes and occasional  irritability  she denies any c/o increased anxiety, No c/o panic attacks or symptoms of PTSD. ALLERGIES/PAST MED/FAM/SOC HISTORY: Ms. Mardy Fothergill allergies, past medical, family and social history were reviewed in the chart and also listed below. Social History     Socioeconomic History    Marital status:       Spouse name: Not on file    Number of children: 6    Years of education: Not on file    Highest education level: Not on file   Occupational History    Not on file   Social Needs    Financial resource strain: Not on file    Food insecurity:     Worry: Not on file     Inability: Not on file    Transportation needs:     Medical: Not on file     Non-medical: Not on file   Tobacco Use    Smoking status: Former Smoker     Packs/day: 0.50     Years: 35.00     Pack years: 17.50     Types: Cigarettes     Last attempt to quit: 2018     Years since quittin.6    Smokeless tobacco: Never Used    Tobacco comment: 5/13/15 has not smoked since hospitalization -    Substance and Sexual Activity    Alcohol use: No     Alcohol/week: 0.0 standard drinks    Drug use: No    Sexual activity: Yes Partners: Male   Lifestyle    Physical activity:     Days per week: Not on file     Minutes per session: Not on file    Stress: Not on file   Relationships    Social connections:     Talks on phone: Not on file     Gets together: Not on file     Attends [employer] service: Not on file     Active member of club or organization: Not on file     Attends meetings of clubs or organizations: Not on file     Relationship status: Not on file    Intimate partner violence:     Fear of current or ex partner: Not on file     Emotionally abused: Not on file     Physically abused: Not on file     Forced sexual activity: Not on file   Other Topics Concern    Not on file   Social History Narrative    Not on file       Ms. Meza current medications are   Outpatient Medications Prior to Visit   Medication Sig Dispense Refill    UNIFINE PENTIPS 31G X 8 MM MISC USE WITH insulin pens 100 each 5    DULoxetine (CYMBALTA) 60 MG extended release capsule Take 1 capsule by mouth daily 30 capsule 0    topiramate (TOPAMAX) 100 MG tablet Take 1 tablet by mouth 2 times daily 60 tablet 0    oxyCODONE-acetaminophen (PERCOCET) 7.5-325 MG per tablet Take 1 tablet by mouth every 6 hours as needed for Pain (max 3/day) for up to 28 days.  84 tablet 0    traZODone (DESYREL) 50 MG tablet Take 1-2 tablets by mouth nightly 60 tablet 0    methocarbamol (ROBAXIN) 500 MG tablet Take 1 tablet by mouth 2 times daily 60 tablet 0    BASAGLAR KWIKPEN 100 UNIT/ML injection pen INJECT 60 UNITS INTO THE SKIN 2 TIMES DAILY 15 mL 5    omeprazole (PRILOSEC) 20 MG delayed release capsule TAKE 1 CAPSULE BY MOUTH DAILY 30 capsule 1    budesonide-formoterol (SYMBICORT) 160-4.5 MCG/ACT AERO Inhale 2 puffs into the lungs daily 2 Inhaler 5    albuterol sulfate HFA (VENTOLIN HFA) 108 (90 Base) MCG/ACT inhaler Inhale 2 puffs into the lungs every 4 hours as needed for Wheezing or Shortness of Breath 3 Inhaler 5    torsemide (DEMADEX) 10 MG tablet TAKE TWO TABLETS BY MOUTH DAILY 60 tablet 5    hydrALAZINE (APRESOLINE) 50 MG tablet TAKE 1 TABLET BY MOUTH 2 TIMES DAILY 60 tablet 5    ranolazine (RANEXA) 1000 MG extended release tablet Take 1 tablet by mouth 2 times daily 60 tablet 8    clopidogrel (PLAVIX) 75 MG tablet TAKE 1 TABLET BY MOUTH DA JULIEN 90 tablet 5    metoprolol succinate (TOPROL XL) 50 MG extended release tablet Take 0.5 tablets by mouth 2 times daily (with meals) 30 tablet 5    amLODIPine (NORVASC) 5 MG tablet Take 1 tablet by mouth daily 90 tablet 5    atorvastatin (LIPITOR) 80 MG tablet Take 1 tablet by mouth daily 90 tablet 3    aspirin (ASPIRIN LOW DOSE) 81 MG EC tablet Take 1 tablet by mouth daily 90 tablet 5    isosorbide mononitrate (IMDUR) 30 MG extended release tablet Take 1 tablet by mouth daily 90 tablet 5    nitroGLYCERIN (NITROSTAT) 0.4 MG SL tablet Place 1 tablet under the tongue every 5 minutes as needed for Chest pain up to max of 3 total doses. If no relief after 1 dose, call 911. 25 tablet 3     No facility-administered medications prior to visit. REVIEW OF SYSTEMS:   Constitutional: Negative for fatigue and unexpected weight change. Eyes: Negative for visual disturbance. Respiratory: Negative for shortness of breath. Cardiovascular: Negative for chest pain, palpitations  Gastrointestinal: Negative for blood in stool, abdominal distention, nausea, vomiting, abdominal pain, diarrhea,constipation. Skin: Negative for color change or any abnormal bruising. Neurological: Negative for speech difficulty, weakness and light-headedness, dizziness, tremors, sleepiness  Psychiatric/Behavioral: Negative for suicidal ideas, hallucinations, behavioral problems, self-injury, decreased concentration/cognition, agitation, confusion. PHYSICAL EXAM:   Nursing note and vitals reviewed.  /75   Pulse 80   Wt 116 lb (52.6 kg)   BMI 22.65 kg/m²   General Appearance: Patient is well nourished, well developed, well groomed and in no mononitrate (IMDUR) 30 MG extended release tablet Take 1 tablet by mouth daily 90 tablet 5    nitroGLYCERIN (NITROSTAT) 0.4 MG SL tablet Place 1 tablet under the tongue every 5 minutes as needed for Chest pain up to max of 3 total doses. If no relief after 1 dose, call 911. 25 tablet 3     No current facility-administered medications for this visit. Goals of current treatment regimen include improvement in pain, restoration of functioning- with focus on improvement in physical performance, general activity, work or disability,emotional distress, health care utilization and  decreased medication consumption. Will continue to monitor progress towards achieving/maintaining therapeutic goals with special emphasis on  1. Improvement in perceived interfernce  of pain with ADL's. Ability to do home exercises independently. Ability to do household chores indoor and/or outdoor work and social and leisure activities. To increase flexibility/ROM, strength and endurance. Improve psychosocial and physical functioning.- she is not showing any significant progress/or showing regression  towards this goal and reassessment and adjustment of goals/treatment have been made. 2. Improving sleep to 6-7 hours a night. Improve mood/ anxiety and depression symptoms such as crying spells, low energy, problems with concentration, motivation. - she is not showing any significant progress/or showing regression  towards this goal and reassessment and adjustment of goals/treatment have been made. 3. Reduction of reliance on opioid analgesia/more appropriate opioid use. - she is showing progression towards this treatment goal with the current regimen. Risks and benefits of the medications and other alternative treatments have been/were  discussed with the patient. Any questions on the  common side effects of these medications were also answered.   She was advised against drinking alcohol with the narcotic pain medicines, advised against driving or handling machinery when  starting or adjusting the dose of medicines, feeling groggy or drowsy, or if having any cognitive issues related to the current medications. Sheis fully aware of the risk of overdose and death, if medicines are misused and not taken as prescribed. If she develops new symptoms or if the symptoms worsen, she was told to call the office. .  Thank you for allowing me to participate in the care of this patient. Rolan Peterson MD.    Cc: Telma Fitch MD    I, Aurelio Richardson, scribing for in the presence  of Dr. Rolan Peterson. 03/04/20  10:05 AM  Iris Samayoa Assistant    I, Dr. Rolan Peterson, personally performed the services described in this documentation as scribed by  Aurelio Richardson MA in my presence and it is both accurate and complete

## 2020-03-10 RX ORDER — LANCING DEVICE
EACH MISCELLANEOUS
Qty: 1 EACH | Refills: 11 | Status: SHIPPED | OUTPATIENT
Start: 2020-03-10 | End: 2021-03-01

## 2020-03-10 RX ORDER — CALCIUM CITRATE/VITAMIN D3 200MG-6.25
TABLET ORAL
Qty: 250 EACH | Refills: 5 | Status: SHIPPED | OUTPATIENT
Start: 2020-03-10 | End: 2020-09-25 | Stop reason: SDUPTHER

## 2020-03-10 RX ORDER — LANCING DEVICE
EACH MISCELLANEOUS
Qty: 300 EACH | Refills: 5 | Status: SHIPPED | OUTPATIENT
Start: 2020-03-10 | End: 2021-03-01

## 2020-03-10 RX ORDER — ALCOHOL ANTISEPTIC PADS
PADS, MEDICATED (EA) TOPICAL
Qty: 100 EACH | Refills: 5 | Status: SHIPPED | OUTPATIENT
Start: 2020-03-10 | End: 2020-08-27

## 2020-03-17 ENCOUNTER — TELEPHONE (OUTPATIENT)
Dept: PRIMARY CARE CLINIC | Age: 65
End: 2020-03-17

## 2020-03-17 RX ORDER — AMOXICILLIN 500 MG/1
500 CAPSULE ORAL 3 TIMES DAILY
Qty: 21 CAPSULE | Refills: 0 | Status: SHIPPED | OUTPATIENT
Start: 2020-03-17 | End: 2020-03-24

## 2020-03-17 RX ORDER — TRAZODONE HYDROCHLORIDE 50 MG/1
50-100 TABLET ORAL NIGHTLY
Qty: 60 TABLET | Refills: 5 | OUTPATIENT
Start: 2020-03-17

## 2020-03-18 RX ORDER — HYDRALAZINE HYDROCHLORIDE 50 MG/1
50 TABLET, FILM COATED ORAL 2 TIMES DAILY
Qty: 60 TABLET | Refills: 5 | Status: SHIPPED | OUTPATIENT
Start: 2020-03-18 | End: 2020-07-22

## 2020-03-18 RX ORDER — OMEPRAZOLE 20 MG/1
20 CAPSULE, DELAYED RELEASE ORAL DAILY
Qty: 30 CAPSULE | Refills: 1 | Status: SHIPPED | OUTPATIENT
Start: 2020-03-18 | End: 2020-05-11 | Stop reason: ALTCHOICE

## 2020-03-19 NOTE — PROGRESS NOTES
Adalberto Holder Patient Age: 80 year old  MESSAGE:   Brother (verbal on file) is calling stating patient see Mary Jo Peacock CNP   on 03/09/2020 in nursing home and was told to come in 2-3 months.   Brother states Mary Jo also mention kidney disease. Brother states he never knew anything about patient having this. Patient would like to speak with clinic.   Please advise. Message confirmed with caller.       WEIGHT AND HEIGHT:   Wt Readings from Last 1 Encounters:   03/09/20 116.6 kg (257 lb)     Ht Readings from Last 1 Encounters:   03/09/20 5' 11\" (1.803 m)     BMI Readings from Last 1 Encounters:   03/09/20 35.84 kg/m²       ALLERGIES: no known allergies.  Current Outpatient Medications   Medication   • Melatonin 5 MG Cap   • hydrALAZINE (APRESOLINE) 25 MG tablet   • VITAMIN D, ERGOCALCIFEROL, PO   • amLODIPine (NORVASC) 5 MG tablet   • Skin Protectants, Misc. (EUCERIN) cream   • lisinopril (ZESTRIL) 10 MG tablet   • acetaminophen (TYLENOL) 325 MG tablet   • hydrOXYzine (ATARAX) 10 MG tablet   • tamsulosin (FLOMAX) 0.4 MG Cap   • docusate sodium (COLACE) 100 MG capsule   • diphenhydrAMINE-zinc acetate (BENADRYL) 2-0.1 % cream   • mometasone (ELOCON) 0.1 % cream   • insulin detemir (LEVEMIR FLEXTOUCH) 100 UNIT/ML pen-injector   • Probiotic Product (RISAQUAD-2) Cap   • aspirin (ECOTRIN) 81 MG EC tablet   • insulin aspart (NOVOLOG FLEXPEN) 100 UNIT/ML pen-injector   • simvastatin (ZOCOR) 20 MG tablet   • metoPROLOL succinate (TOPROL-XL) 100 MG 24 hr tablet   • Cyanocobalamin (B-12 PO)     No current facility-administered medications for this visit.      PHARMACY to use: Verify if needed.              Pharmacy preference(s) on file:   [id number] - [place], [place] - [address] AT [facility] [place]  [address]  [facility] [address]  Phone: [phone] Fax: [phone]      CALL BACK INFO: Ok to leave response (including medical information) on answering machine  ROUTING:  Patient's physician/staff        PCP: Ko Eli MD         INS: Payor: MEDICARE / Plan: PARTA AND B / Product Type: MEDICARE   PATIENT ADDRESS:  [address] [place], [place] [id number]     wants to spencer it to the 10 mg. She reports she is in cardiac rehab now. Sleep is poor,  poor sleep latency, averages 2-3 hours of sleep at night. Intermittent, non restorative. Does not feel rested in AM. Complains of feeling sleepy  during the day. She says she is using Seroquel, which is not helping as well. Patient's  subjective report of her mood is fair. she describes occasional symptoms of depression, occasional  irritability and some mood swings. Describes her mood as being neutral and reports some pleasure in her daily activities. Reports  fair  appetite, energy and concentration. Able to function well in different aspects of her daily activities. Denies suicidal or homicidal ideation. Denies any complaints of increased tension, does   Worry sometimes and occasional  irritability  she denies any c/o increased anxiety, No c/o panic attacks or symptoms of PTSD. ALLERGIES/PAST MED/FAM/SOC HISTORY: Ms. Gina Norwood allergies, past medical, family and social history were reviewed in the chart and pertinent information also listed below. Social History     Social History    Marital status:      Spouse name: N/A    Number of children: 6    Years of education: N/A     Occupational History    Not on file. Social History Main Topics    Smoking status: Former Smoker     Packs/day: 0.50     Years: 35.00     Types: Cigarettes     Quit date: 4/26/2015    Smokeless tobacco: Never Used      Comment: 5/13/15 has not smoked since hospitalization - kh    Alcohol use No    Drug use: No    Sexual activity: Yes     Partners: Male     Other Topics Concern    Not on file     Social History Narrative    No narrative on file      Ms. Meza current medications are   Outpatient Medications Prior to Visit   Medication Sig Dispense Refill    BASAGLAR KWIKPEN 100 UNIT/ML injection pen INJECT 40 UNITS IN AM  AND  35 UNITS IN PM 15 mL 5    amLODIPine (NORVASC) 10 MG tablet Take 0.5 tablets by mouth daily 30 plan changed as below    5. Intractable migraine with aura without status migrainosus - STABLE: Continue current treatment plan    6. Benign essential tremor - STABLE: Continue current treatment plan    7. Giant cell arteritis (HCC) - STABLE: Continue current treatment plan    8. Recurrent major depressive disorder, in partial remission ([facility] [address] - STABLE: Continue current treatment plan        PLAN:   Informed verbal consent regarding treatment plan was obtained from Ms. Meza   -Urine drug screen with GC/MS for opiates and drugs of abuse was ordered and will follow up on results.   -Increase Percocet to 3-4 per day   -consider swimming exercises   -Walking/stretching   -Start Aquatic therapy   -Increase Seroquel to 50 mg to help with insomnia and anxiety  -she was advised proper sleep hygiene-told to avoid:use of caffeine or other stimulants after noon, alcohol use near bedtime, long or frequent naps during the day, erratic sleep schedule, heavy meals near bedtime, vigorous exercise near bedtime and use of electronic devices near bedtime   -she was advised  to avoid using too many OTC analgesics to control the headaches, avoid chocolates, increased caffeine, cheeses and MSG nitrite containing foods, cigarette smoking. To avoid bright lights, strong smells and skipping meals. -Adv Biofeedback, relaxation and meditation techniques. Referral to psychologist for CBT was also discussed with patient   -Long discussion with Patient and son regards to opioid   -Start Mexican medications or different medication, discuss with patient and she wants to stay with Percocet only   -Records from PCP and ER reviewed     Goals of current treatment regimen include improvement in pain, restoration of functioning- with focus on improvement in physical performance, general activity, work or disability,emotional distress, health care utilization and  decreased medication consumption.  Will continue to monitor progress towards

## 2020-03-24 ENCOUNTER — NURSE TRIAGE (OUTPATIENT)
Dept: OTHER | Facility: CLINIC | Age: 65
End: 2020-03-24

## 2020-03-24 ENCOUNTER — HOSPITAL ENCOUNTER (EMERGENCY)
Age: 65
Discharge: HOME | End: 2020-03-24
Attending: EMERGENCY MEDICINE
Payer: COMMERCIAL

## 2020-03-24 ENCOUNTER — APPOINTMENT (OUTPATIENT)
Dept: GENERAL RADIOLOGY | Age: 65
End: 2020-03-24
Payer: COMMERCIAL

## 2020-03-24 VITALS
SYSTOLIC BLOOD PRESSURE: 137 MMHG | DIASTOLIC BLOOD PRESSURE: 88 MMHG | TEMPERATURE: 98.5 F | BODY MASS INDEX: 23.46 KG/M2 | HEIGHT: 60 IN | WEIGHT: 119.49 LBS | OXYGEN SATURATION: 100 % | RESPIRATION RATE: 9 BRPM | HEART RATE: 56 BPM

## 2020-03-24 LAB
A/G RATIO: 0.9 (ref 1.1–2.2)
ALBUMIN SERPL-MCNC: 4.2 G/DL (ref 3.4–5)
ALP BLD-CCNC: 150 U/L (ref 40–129)
ALT SERPL-CCNC: 31 U/L (ref 10–40)
ANION GAP SERPL CALCULATED.3IONS-SCNC: 19 MMOL/L (ref 3–16)
AST SERPL-CCNC: 45 U/L (ref 15–37)
BASOPHILS ABSOLUTE: 0 K/UL (ref 0–0.2)
BASOPHILS RELATIVE PERCENT: 0.4 %
BILIRUB SERPL-MCNC: 0.3 MG/DL (ref 0–1)
BUN BLDV-MCNC: 22 MG/DL (ref 7–20)
CALCIUM SERPL-MCNC: 10.1 MG/DL (ref 8.3–10.6)
CHLORIDE BLD-SCNC: 104 MMOL/L (ref 99–110)
CO2: 15 MMOL/L (ref 21–32)
CREAT SERPL-MCNC: 1.3 MG/DL (ref 0.6–1.2)
EKG ATRIAL RATE: 62 BPM
EKG DIAGNOSIS: NORMAL
EKG P AXIS: -19 DEGREES
EKG P-R INTERVAL: 102 MS
EKG Q-T INTERVAL: 384 MS
EKG QRS DURATION: 82 MS
EKG QTC CALCULATION (BAZETT): 389 MS
EKG R AXIS: 46 DEGREES
EKG T AXIS: 266 DEGREES
EKG VENTRICULAR RATE: 62 BPM
EOSINOPHILS ABSOLUTE: 0.1 K/UL (ref 0–0.6)
EOSINOPHILS RELATIVE PERCENT: 2.1 %
GFR AFRICAN AMERICAN: 50
GFR NON-AFRICAN AMERICAN: 41
GLOBULIN: 4.6 G/DL
GLUCOSE BLD-MCNC: 222 MG/DL (ref 70–99)
HCT VFR BLD CALC: 39.2 % (ref 36–48)
HEMOGLOBIN: 12 G/DL (ref 12–16)
LIPASE: 21 U/L (ref 13–60)
LYMPHOCYTES ABSOLUTE: 1.8 K/UL (ref 1–5.1)
LYMPHOCYTES RELATIVE PERCENT: 29.9 %
MCH RBC QN AUTO: 32.3 PG (ref 26–34)
MCHC RBC AUTO-ENTMCNC: 30.7 G/DL (ref 31–36)
MCV RBC AUTO: 105.1 FL (ref 80–100)
MONOCYTES ABSOLUTE: 0.3 K/UL (ref 0–1.3)
MONOCYTES RELATIVE PERCENT: 5.7 %
NEUTROPHILS ABSOLUTE: 3.7 K/UL (ref 1.7–7.7)
NEUTROPHILS RELATIVE PERCENT: 61.9 %
PDW BLD-RTO: 15.5 % (ref 12.4–15.4)
PLATELET # BLD: 216 K/UL (ref 135–450)
PMV BLD AUTO: 8.9 FL (ref 5–10.5)
POTASSIUM REFLEX MAGNESIUM: 4.1 MMOL/L (ref 3.5–5.1)
PRO-BNP: 1029 PG/ML (ref 0–124)
RBC # BLD: 3.73 M/UL (ref 4–5.2)
SODIUM BLD-SCNC: 138 MMOL/L (ref 136–145)
TOTAL PROTEIN: 8.8 G/DL (ref 6.4–8.2)
TROPONIN: 0.01 NG/ML
WBC # BLD: 5.9 K/UL (ref 4–11)

## 2020-03-24 PROCEDURE — 83880 ASSAY OF NATRIURETIC PEPTIDE: CPT

## 2020-03-24 PROCEDURE — 99285 EMERGENCY DEPT VISIT HI MDM: CPT

## 2020-03-24 PROCEDURE — 80053 COMPREHEN METABOLIC PANEL: CPT

## 2020-03-24 PROCEDURE — 84484 ASSAY OF TROPONIN QUANT: CPT

## 2020-03-24 PROCEDURE — 6370000000 HC RX 637 (ALT 250 FOR IP): Performed by: EMERGENCY MEDICINE

## 2020-03-24 PROCEDURE — 85025 COMPLETE CBC W/AUTO DIFF WBC: CPT

## 2020-03-24 PROCEDURE — 36415 COLL VENOUS BLD VENIPUNCTURE: CPT

## 2020-03-24 PROCEDURE — 83690 ASSAY OF LIPASE: CPT

## 2020-03-24 PROCEDURE — 71046 X-RAY EXAM CHEST 2 VIEWS: CPT

## 2020-03-24 PROCEDURE — 93010 ELECTROCARDIOGRAM REPORT: CPT | Performed by: INTERNAL MEDICINE

## 2020-03-24 PROCEDURE — 93005 ELECTROCARDIOGRAM TRACING: CPT | Performed by: EMERGENCY MEDICINE

## 2020-03-24 RX ORDER — IPRATROPIUM BROMIDE AND ALBUTEROL SULFATE 2.5; .5 MG/3ML; MG/3ML
1 SOLUTION RESPIRATORY (INHALATION)
Status: DISCONTINUED | OUTPATIENT
Start: 2020-03-24 | End: 2020-03-24 | Stop reason: HOSPADM

## 2020-03-24 RX ORDER — ALBUTEROL SULFATE 90 UG/1
2 AEROSOL, METERED RESPIRATORY (INHALATION) EVERY 4 HOURS PRN
Qty: 1 INHALER | Refills: 0 | Status: SHIPPED | OUTPATIENT
Start: 2020-03-24 | End: 2020-05-11 | Stop reason: ALTCHOICE

## 2020-03-24 RX ORDER — PREDNISONE 20 MG/1
40 TABLET ORAL DAILY
Qty: 10 TABLET | Refills: 0 | Status: SHIPPED | OUTPATIENT
Start: 2020-03-24 | End: 2020-03-29

## 2020-03-24 RX ORDER — ASPIRIN 81 MG/1
243 TABLET, CHEWABLE ORAL ONCE
Status: COMPLETED | OUTPATIENT
Start: 2020-03-24 | End: 2020-03-24

## 2020-03-24 RX ORDER — PREDNISONE 20 MG/1
40 TABLET ORAL ONCE
Status: COMPLETED | OUTPATIENT
Start: 2020-03-24 | End: 2020-03-24

## 2020-03-24 RX ADMIN — ASPIRIN 81 MG 243 MG: 81 TABLET ORAL at 15:05

## 2020-03-24 RX ADMIN — NITROGLYCERIN 0.5 INCH: 20 OINTMENT TOPICAL at 15:04

## 2020-03-24 RX ADMIN — IPRATROPIUM BROMIDE AND ALBUTEROL SULFATE 1 AMPULE: .5; 3 SOLUTION RESPIRATORY (INHALATION) at 15:39

## 2020-03-24 RX ADMIN — PREDNISONE 40 MG: 20 TABLET ORAL at 15:05

## 2020-03-24 ASSESSMENT — ENCOUNTER SYMPTOMS
BACK PAIN: 0
SHORTNESS OF BREATH: 1
NAUSEA: 1
VOMITING: 0
ABDOMINAL PAIN: 0
SORE THROAT: 0
COUGH: 0
CONSTIPATION: 0
DIARRHEA: 0
CHEST TIGHTNESS: 1
WHEEZING: 1

## 2020-03-24 ASSESSMENT — PAIN SCALES - GENERAL
PAINLEVEL_OUTOF10: 9
PAINLEVEL_OUTOF10: 10
PAINLEVEL_OUTOF10: 9

## 2020-03-24 ASSESSMENT — PAIN DESCRIPTION - PROGRESSION: CLINICAL_PROGRESSION: GRADUALLY WORSENING

## 2020-03-24 ASSESSMENT — PAIN DESCRIPTION - ONSET: ONSET: ON-GOING

## 2020-03-24 ASSESSMENT — PAIN DESCRIPTION - PAIN TYPE: TYPE: ACUTE PAIN

## 2020-03-24 ASSESSMENT — PAIN DESCRIPTION - DESCRIPTORS: DESCRIPTORS: STABBING

## 2020-03-24 ASSESSMENT — PAIN DESCRIPTION - LOCATION
LOCATION: CHEST
LOCATION: CHEST

## 2020-03-24 ASSESSMENT — PAIN - FUNCTIONAL ASSESSMENT: PAIN_FUNCTIONAL_ASSESSMENT: PREVENTS OR INTERFERES SOME ACTIVE ACTIVITIES AND ADLS

## 2020-03-24 ASSESSMENT — PAIN DESCRIPTION - FREQUENCY: FREQUENCY: CONTINUOUS

## 2020-03-24 NOTE — TELEPHONE ENCOUNTER
Spoke with Maren    Multiple issues . Back pain / SOB /wheezing . Did not check Temp .  Does not want to come to Clinic     States she is on her way to ER

## 2020-03-24 NOTE — TELEPHONE ENCOUNTER
Patient called [place] [facility]) to schedule appointment, with red flag complaint, transferred to RN access for triage. Reports having dizziness, tightness to back (back pain) and shortness of breath with exertion. Denies any current dizziness and shortness of breath. States back pain began last night. Rates pain 8/10. Patient informed of disposition. Care advice as documented. Patient verbalized understanding and denies any further questions/concerns. Please do not respond to the triage nurse through this encounter. Any subsequent communication should be directly with the patient.     Reason for Disposition   SEVERE back pain of sudden onset and age > 61    Protocols used: BACK PAIN-ADULT-OH other

## 2020-03-24 NOTE — ED PROVIDER NOTES
[facility] ENCOUNTER      Pt Name: Alcira Stewart  MRN: [id number]  [place] 1955  Date of evaluation: 3/24/2020  Provider: Lisa Hsieh MD    [address] [place] [address]       Chief Complaint   Patient presents with    Chest Pain         HISTORY OF PRESENT ILLNESS   (Location/Symptom, Timing/Onset, Context/Setting, Quality, Duration, Modifying Factors, Severity)  Note limiting factors. Alcira Stewart is a 62 y.o. female who presents to the emergency department     Patient is a 70-year-old female with a past medical history of hypertension, hyperlipidemia, coronary artery disease status post PCI in January, peripheral vascular disease, COPD who presents with chest tightness, shortness of breath, and wheezing. Patient reports that since yesterday she is felt more short of breath on exertion and found that she wheezes on exertion. She reports chest tightness associated with that as well as some tightness in her upper back. She states that the pain is currently present in her center of her chest and also feels it slightly in her back. She rates his pain as an 8 out of 10 on the pain scale. Patient took a baby aspirin and 2 doses of nitro prior to arrival.  Patient recently had significant cardiac stenting done in January and has since been seen for further chest pain and was advised to do medical management. Patient was concerned that today her symptoms could be due to her COPD and she is ended up very sick from COPD exacerbations in the past so wanted to get an early. She does report some nausea but denies any other associated symptoms such as fever, chills, or cough. The history is provided by the patient. Nursing Notes were reviewed. REVIEW OF SYSTEMS    (2-9 systems for level 4, 10 or more for level 5)     Review of Systems   Constitutional: Negative for chills and fever. HENT: Negative for congestion and sore throat.     Eyes: Negative for needed for Pain (max 3/day) for up to 28 days. PHARMACIST CHOICE LANCETS MISC    USE TO TEST BLOOD GLUCOSE THREE TIMES DAILY    QUETIAPINE (SEROQUEL) 25 MG TABLET    Take 1-2 tablets by mouth nightly    RANOLAZINE (RANEXA) 1000 MG EXTENDED RELEASE TABLET    Take 1 tablet by mouth 2 times daily    TOPIRAMATE (TOPAMAX) 100 MG TABLET    Take 1 tablet by mouth 2 times daily    TORSEMIDE (DEMADEX) 10 MG TABLET    TAKE TWO TABLETS BY MOUTH DAILY    TRUE METRIX BLOOD GLUCOSE TEST STRIP    USE TO TEST BLOOD GLUCOSE THREE TIMES DAILY    ULTICARE ALCOHOL SWABS 70 % PADS    USE TO TEST BLOOD GLUCOSE THREE TIMES DAILY    UNIFINE PENTIPS 31G X 8 MM MISC    USE WITH insulin pens       ALLERGIES     Patient has no known allergies. FAMILY HISTORY       Family History   Problem Relation Age of Onset    Diabetes Mother     Hypertension Mother     High Cholesterol Mother     Stroke Mother     Cancer Mother     No Known Problems Paternal Grandfather         lung issues           SOCIAL HISTORY       Social History     Socioeconomic History    Marital status:       Spouse name: None    Number of children: 6    Years of education: None    Highest education level: None   Occupational History    None   Social Needs    Financial resource strain: None    Food insecurity     Worry: None     Inability: None    Transportation needs     Medical: None     Non-medical: None   Tobacco Use    Smoking status: Former Smoker     Packs/day: 0.50     Years: 35.00     Pack years: 17.50     Types: Cigarettes     Last attempt to quit: 2018     Years since quittin.7    Smokeless tobacco: Never Used    Tobacco comment: 5/13/15 has not smoked since hospitalization -    Substance and Sexual Activity    Alcohol use: No     Alcohol/week: 0.0 standard drinks    Drug use: No    Sexual activity: Yes     Partners: Male   Lifestyle    Physical activity     Days per week: None     Minutes per session: None    Stress: None returned as of this dictation. EMERGENCY DEPARTMENT COURSE and DIFFERENTIAL DIAGNOSIS/MDM:   Vitals:    Vitals:    03/24/20 1347 03/24/20 1400   BP: (!) 188/66 (!) 179/73   Pulse: 68 63   Resp: 18 13   Temp: 98.5 °F (36.9 °C)    TempSrc: Oral    SpO2: 100% 100%   Weight: 119 lb 7.8 oz (54.2 kg)    Height: 5' (1.524 m)        Patient evaluated and previous record reviewed. Patient presents with chest tightness, shortness of breath, and wheezing on exertion. Vital signs notable for hypertension. Physical exam as documented above. Differential includes COPD exacerbation, ACS, angina, viral syndrome, pneumonia. Lab work obtained and notable for white blood cell count within normal limits, electrolytes within normal limits, BNP slightly elevated, troponin 0 0.01. Chest x-ray without acute cardiopulmonary abnormality. Patient given aspirin, nitro, prednisone, and breathing treatment with improvement in symptoms. Patient reports that she does feel significantly better after these interventions but pain is still present. On reevaluation patient's lung exam has improved and that she is moving more air but now she has expiratory wheezes throughout. Believe that patient symptoms are most likely due to COPD exacerbation. Will treat with albuterol inhaler and steroids. Do not feel as though antibiotics are indicated at this time as patient does not have a productive cough or signs of pneumonia. Advised patient of at home care instructions as well as return precautions and she voices understanding.     CONSULTS:  None    PROCEDURES:  Unless otherwise noted below, none     Procedures      FINAL IMPRESSION      1. COPD exacerbation (HCC)    2. Chest tightness          DISPOSITION/PLAN   DISPOSITION        PATIENT REFERRED TO:  Bobbi Barton MD  [place] [address]  [address]  [phone]    Call   As needed    Your cardiologist    Schedule an appointment as soon as possible for a visit         DISCHARGE

## 2020-03-25 ENCOUNTER — CARE COORDINATION (OUTPATIENT)
Dept: CARE COORDINATION | Age: 65
End: 2020-03-25

## 2020-03-25 ENCOUNTER — TELEPHONE (OUTPATIENT)
Dept: PRIMARY CARE CLINIC | Age: 65
End: 2020-03-25

## 2020-03-25 ENCOUNTER — HOSPITAL ENCOUNTER (EMERGENCY)
Age: 65
Discharge: HOME | End: 2020-03-26
Attending: EMERGENCY MEDICINE
Payer: COMMERCIAL

## 2020-03-25 VITALS
SYSTOLIC BLOOD PRESSURE: 175 MMHG | RESPIRATION RATE: 18 BRPM | TEMPERATURE: 98.7 F | HEART RATE: 105 BPM | DIASTOLIC BLOOD PRESSURE: 76 MMHG | OXYGEN SATURATION: 100 %

## 2020-03-25 LAB
ANION GAP SERPL CALCULATED.3IONS-SCNC: 19 MMOL/L (ref 3–16)
BASOPHILS ABSOLUTE: 0 K/UL (ref 0–0.2)
BASOPHILS RELATIVE PERCENT: 0.3 %
BUN BLDV-MCNC: 30 MG/DL (ref 7–20)
CALCIUM SERPL-MCNC: 10 MG/DL (ref 8.3–10.6)
CHLORIDE BLD-SCNC: 100 MMOL/L (ref 99–110)
CO2: 13 MMOL/L (ref 21–32)
CREAT SERPL-MCNC: 1.5 MG/DL (ref 0.6–1.2)
EOSINOPHILS ABSOLUTE: 0 K/UL (ref 0–0.6)
EOSINOPHILS RELATIVE PERCENT: 0 %
GFR AFRICAN AMERICAN: 42
GFR NON-AFRICAN AMERICAN: 35
GLUCOSE BLD-MCNC: 643 MG/DL (ref 70–99)
HCT VFR BLD CALC: 33.1 % (ref 36–48)
HEMOGLOBIN: 10.6 G/DL (ref 12–16)
LYMPHOCYTES ABSOLUTE: 0.4 K/UL (ref 1–5.1)
LYMPHOCYTES RELATIVE PERCENT: 6 %
MCH RBC QN AUTO: 32.5 PG (ref 26–34)
MCHC RBC AUTO-ENTMCNC: 31.8 G/DL (ref 31–36)
MCV RBC AUTO: 102.1 FL (ref 80–100)
MONOCYTES ABSOLUTE: 0 K/UL (ref 0–1.3)
MONOCYTES RELATIVE PERCENT: 0.7 %
NEUTROPHILS ABSOLUTE: 6.5 K/UL (ref 1.7–7.7)
NEUTROPHILS RELATIVE PERCENT: 93 %
PDW BLD-RTO: 15.4 % (ref 12.4–15.4)
PLATELET # BLD: 240 K/UL (ref 135–450)
PMV BLD AUTO: 9.3 FL (ref 5–10.5)
POTASSIUM SERPL-SCNC: 4.5 MMOL/L (ref 3.5–5.1)
RBC # BLD: 3.25 M/UL (ref 4–5.2)
REASON FOR REJECTION: NORMAL
REJECTED TEST: NORMAL
SODIUM BLD-SCNC: 132 MMOL/L (ref 136–145)
TROPONIN: <0.01 NG/ML
WBC # BLD: 7 K/UL (ref 4–11)

## 2020-03-25 PROCEDURE — 36600 WITHDRAWAL OF ARTERIAL BLOOD: CPT

## 2020-03-25 PROCEDURE — 96374 THER/PROPH/DIAG INJ IV PUSH: CPT

## 2020-03-25 PROCEDURE — 99284 EMERGENCY DEPT VISIT MOD MDM: CPT

## 2020-03-25 PROCEDURE — 84484 ASSAY OF TROPONIN QUANT: CPT

## 2020-03-25 PROCEDURE — 80048 BASIC METABOLIC PNL TOTAL CA: CPT

## 2020-03-25 PROCEDURE — 85025 COMPLETE CBC W/AUTO DIFF WBC: CPT

## 2020-03-25 PROCEDURE — 6370000000 HC RX 637 (ALT 250 FOR IP): Performed by: EMERGENCY MEDICINE

## 2020-03-25 PROCEDURE — 6370000000 HC RX 637 (ALT 250 FOR IP): Performed by: NURSE PRACTITIONER

## 2020-03-25 RX ORDER — LIDOCAINE 50 MG/G
1 PATCH TOPICAL DAILY
Qty: 30 PATCH | Refills: 0 | Status: SHIPPED | OUTPATIENT
Start: 2020-03-25 | End: 2020-05-11 | Stop reason: ALTCHOICE

## 2020-03-25 RX ORDER — OXYCODONE AND ACETAMINOPHEN 10; 325 MG/1; MG/1
1 TABLET ORAL ONCE
Status: COMPLETED | OUTPATIENT
Start: 2020-03-25 | End: 2020-03-25

## 2020-03-25 RX ORDER — ALBUTEROL SULFATE 2.5 MG/3ML
2.5 SOLUTION RESPIRATORY (INHALATION) EVERY 4 HOURS PRN
Qty: 120 EACH | Refills: 5 | Status: SHIPPED | OUTPATIENT
Start: 2020-03-25 | End: 2022-03-14

## 2020-03-25 RX ORDER — LIDOCAINE 4 G/G
1 PATCH TOPICAL ONCE
Status: DISCONTINUED | OUTPATIENT
Start: 2020-03-25 | End: 2020-03-26 | Stop reason: HOSPADM

## 2020-03-25 RX ORDER — LIDOCAINE 4 G/G
1 PATCH TOPICAL DAILY
Status: DISCONTINUED | OUTPATIENT
Start: 2020-03-26 | End: 2020-03-25

## 2020-03-25 RX ADMIN — INSULIN HUMAN 10 UNITS: 100 INJECTION, SOLUTION PARENTERAL at 23:16

## 2020-03-25 RX ADMIN — OXYCODONE HYDROCHLORIDE AND ACETAMINOPHEN 1 TABLET: 10; 325 TABLET ORAL at 21:55

## 2020-03-25 ASSESSMENT — PAIN DESCRIPTION - PAIN TYPE: TYPE: ACUTE PAIN

## 2020-03-25 ASSESSMENT — PAIN SCALES - GENERAL
PAINLEVEL_OUTOF10: 8
PAINLEVEL_OUTOF10: 10

## 2020-03-25 ASSESSMENT — PAIN DESCRIPTION - FREQUENCY: FREQUENCY: CONTINUOUS

## 2020-03-25 ASSESSMENT — PAIN DESCRIPTION - DESCRIPTORS: DESCRIPTORS: ACHING;CONSTANT

## 2020-03-25 ASSESSMENT — PAIN DESCRIPTION - ORIENTATION: ORIENTATION: LEFT;LOWER

## 2020-03-25 ASSESSMENT — PAIN DESCRIPTION - LOCATION: LOCATION: BACK

## 2020-03-25 NOTE — TELEPHONE ENCOUNTER
Calling regarding the solution for the Nebulizer. pls return the call. thank you!     [place]: [phone]

## 2020-03-25 NOTE — TELEPHONE ENCOUNTER
Pt called to let MD know she went to the ER for her COPD as directed and was told to inquire about a nebulizer.  Please advise

## 2020-03-25 NOTE — CARE COORDINATION
COVID-19 Screening Initial Follow-up Note    Patient contacted regarding Isa Reed. Care Transition Nurse/ Ambulatory Care Manager contacted the patient by telephone to perform post discharge assessment. Verified name and  with patient as identifiers. Provided introduction to self, and explanation of the CTN/ACM role, and reason for call due to risk factors for infection and/or exposure to COVID-19. Symptoms reviewed with patient who verbalized the following symptoms: pain or aching joints, shortness of breath and no new/worsening symptoms       Due to no new or worsening symptoms encounter was not routed to provider for escalation. Patient has following risk factors of: COPD. CTN/ACM reviewed discharge instructions, medical action plan and red flags such as increased shortness of breath, increasing fever and signs of decompensation with patient who verbalized understanding. Discussed exposure protocols and quarantine with CDC Guidelines What to do if you are sick with coronavirus disease 2019 Patient who was given an opportunity for questions and concerns. The patient agrees to contact the [facility] exposure line [phone], local [facility] department [place] [facility]: ([phone]) and PCP office for questions related to their healthcare. CTN/ACM provided contact information for future reference.     Reviewed and educated patient on any new and changed medications related to discharge diagnosis     Plan for follow-up call in 5-7 days based on severity of symptoms and risk factors

## 2020-03-26 ASSESSMENT — PAIN SCALES - GENERAL: PAINLEVEL_OUTOF10: 7

## 2020-03-26 ASSESSMENT — PAIN DESCRIPTION - LOCATION: LOCATION: BACK

## 2020-03-26 ASSESSMENT — PAIN DESCRIPTION - PAIN TYPE: TYPE: CHRONIC PAIN

## 2020-03-26 ASSESSMENT — PAIN DESCRIPTION - ORIENTATION: ORIENTATION: LEFT;LOWER

## 2020-03-26 NOTE — ED NOTES
D/C: Order noted for d/c. Pt confirmed d/c paperwork and prescription have correct name. Discharge and education instructions reviewed with patient. Teach-back successful. Pt verbalized understanding and signed d/c papers. Pt denied questions at this time. No acute distress noted. Patient instructed to follow-up as noted - return to emergency department if symptoms worsen. Patient verbalized understanding. Discharged per EDMD with discharge instructions. Pt discharged to private vehicle. Patient stable upon departure. Thanked patient for choosing [facility]) for care. Provider aware of patient pain at time of discharge.      Johnson Cazares, RN  03/25/20 6153

## 2020-03-26 NOTE — ED PROVIDER NOTES
[facility]  eMERGENCY dEPARTMENT eNCOUnter      Pt Name: Mary Arreola  MRN: [id number]  [place] 1955  Date of evaluation: 3/25/2020  Provider: Lance Hawk MD  PCP: Loida Gómez MD      CHIEF COMPLAINT       Chief Complaint   Patient presents with    Shortness of Breath     was seen here yesterday for COPD exacerbation and sent home. States she was supposed to get a nebulizer delivered to her home today but it wasn't. Comes back tonight with \"left bottom lung pain\" and SOB       HISTORY OFPRESENT ILLNESS   (Location/Symptom, Timing/Onset, Context/Setting, Quality, Duration, Modifying Factors,Severity)  Note limiting factors. Mary Arreola is a 62 y.o. female presents with complaints that she has left lower lung pain she does have a history of COPD she was seen here yesterday she was sent home on steroids she says that she feels short of breath but the reason she is here is because her nebulizer which was supposed to arrive today did not    Nursing Notes were all reviewed and agreed with or any disagreements were addressed  in the HPI. REVIEW OF SYSTEMS    (2-9 systems for level 4, 10 or more for level 5)     Review of Systems    Positives and Pertinent negatives as per HPI. Except as noted above in the ROS, all other systems were reviewed andnegative.        PASTMEDICAL HISTORY     Past Medical History:   Diagnosis Date    Acid reflux     Anemia     Anxiety     Arthritis     Asthma     Atrial fibrillation (HCC)     Blood transfusion reaction     CAD (coronary artery disease) 12/3/2012    CHF (congestive heart failure) (HCC)     Chronic kidney disease     40% kidney functiom    COPD (chronic obstructive pulmonary disease) (HCC)     Depression     DM2 (diabetes mellitus, type 2) ([facility] [address]     Dysarthria     Fibromyalgia 6/7/2016    Headache(784.0) 2/19/2013    Hemisensory loss     Hyperlipidemia     Hypertension     IBS GABAPENTIN (NEURONTIN) 300 MG CAPSULE    One tab hs 1 week, 2 tabs hs 1 week, 1 tab am 2 tabs pm    HYDRALAZINE (APRESOLINE) 50 MG TABLET    TAKE 1 TABLET BY MOUTH 2 TIMES DAILY    ISOSORBIDE MONONITRATE (IMDUR) 30 MG EXTENDED RELEASE TABLET    Take 1 tablet by mouth daily    LANCET DEVICES (SIMPLE DIAGNOSTICS LANCING DEV) MISC    USE WITH LANCETS TO TEST BLOOD GLUCOSE    METHOCARBAMOL (ROBAXIN) 500 MG TABLET    Take 1 tablet by mouth 2 times daily    METOPROLOL SUCCINATE (TOPROL XL) 50 MG EXTENDED RELEASE TABLET    Take 0.5 tablets by mouth 2 times daily (with meals)    NEBULIZERS (COMPRESSOR/NEBULIZER) MISC    1 Device by Does not apply route 4 times daily as needed (SOB / Wheezing)    NITROGLYCERIN (NITROSTAT) 0.4 MG SL TABLET    Place 1 tablet under the tongue every 5 minutes as needed for Chest pain up to max of 3 total doses. If no relief after 1 dose, call 911. OMEPRAZOLE (PRILOSEC) 20 MG DELAYED RELEASE CAPSULE    TAKE 1 CAPSULE BY MOUTH DAILY    OXYCODONE-ACETAMINOPHEN (PERCOCET) 7.5-325 MG PER TABLET    Take 1 tablet by mouth every 6 hours as needed for Pain (max 3/day) for up to 28 days. PHARMACIST CHOICE LANCETS MISC    USE TO TEST BLOOD GLUCOSE THREE TIMES DAILY    PREDNISONE (DELTASONE) 20 MG TABLET    Take 2 tablets by mouth daily for 5 days    QUETIAPINE (SEROQUEL) 25 MG TABLET    Take 1-2 tablets by mouth nightly    RANOLAZINE (RANEXA) 1000 MG EXTENDED RELEASE TABLET    Take 1 tablet by mouth 2 times daily    TOPIRAMATE (TOPAMAX) 100 MG TABLET    Take 1 tablet by mouth 2 times daily    TORSEMIDE (DEMADEX) 10 MG TABLET    TAKE TWO TABLETS BY MOUTH DAILY    TRUE METRIX BLOOD GLUCOSE TEST STRIP    USE TO TEST BLOOD GLUCOSE THREE TIMES DAILY    ULTICARE ALCOHOL SWABS 70 % PADS    USE TO TEST BLOOD GLUCOSE THREE TIMES DAILY    UNIFINE PENTIPS 31G X 8 MM MISC    USE WITH insulin pens       ALLERGIES     Patient has no known allergies.     FAMILY HISTORY       Family History   Problem Relation Age of cooperative, no distress, appears stated age. Head:  Normocephalic, without obviousabnormality, atraumatic. Eyes:  conjunctiva/corneas clear, EOM's intact. Sclera anicteric. ENT: Mucous membranes moist.   Neck: Supple, symmetrical, trachea midline, no adenopathy. No jugular venous distention. Lungs:   Clear to auscultation bilaterally, respirationsunlabored. No rales, rhonchi or wheezes. Chest Wall:  No tenderness. Heart:  Regular rate and rhythm, S1 and S2 normal, no murmur, rub or gallop. Abdomen:   Soft, non-tender, bowel sounds active,   no masses, no organomegaly. Extremities: No edema, cords or calf tenderness. Full range of motion. Pulses: 2+ and symmetric   Skin: Turgor is normal, no rashes or lesions. Neurologic: Alert and oriented X 3. No focal findings. Motor grossly normal.  Speech clear, no drift, CN III-XII grossly intact,        DIAGNOSTIC RESULTS   LABS:    Labs Reviewed   CBC WITH AUTO DIFFERENTIAL - Abnormal; Notable for the following components:       Result Value    RBC 3.25 (*)     Hemoglobin 10.6 (*)     Hematocrit 33.1 (*)     .1 (*)     Lymphocytes Absolute 0.4 (*)     All other components within normal limits    Narrative:     Performed at:  [address] [facility] [address] [address]   Phone [phone]   BASIC METABOLIC PANEL - Abnormal; Notable for the following components:    Sodium 132 (*)     CO2 13 (*)     Anion Gap 19 (*)     BUN 30 (*)     CREATININE 1.5 (*)     GFR Non- 35 (*)     GFR  42 (*)     All other components within normal limits    Narrative:     Verena Ceja tel. [phone],  Chemistry results called to and read back by Emil Vicente RN. , 03/25/2020  22:30, by Rajni Hunt  Performed at:  [address]   Phone [phone]   TROPONIN    Narrative:     Performed at:  [facility]  [address] [facility], [place] [address]   Phone [phone]   SPECIMEN REJECTION    Narrative:     Maryanne Bhandari tel. [phone], called nurse Theodore Manuel, will reorder  called nurse oralia, will reorder, 03/25/2020 22:37, by CASTR  Performed at:  [facility]  [address] [place], [facility] [place] [address]   Phone [phone]       All other labs were within normal range or not returned as of this dictation. EKG: All EKG's are interpreted by the Emergency Department Physician who eithersigns or Co-signs this chart in the absence of a cardiologist.        RADIOLOGY:   Non-plain film images such as CT, Ultrasound and MRI are read by the radiologist. Plain radiographic images are visualized by myself. *    Interpretation per the Radiologist below, if available at the time of this note:    No orders to display         PROCEDURES   Unless otherwise noted below, none     Procedures    *    CRITICAL CARE TIME   N/A      EMERGENCY DEPARTMENT COURSE and DIFFERENTIALDIAGNOSIS/MDM:   Vitals:    Vitals:    03/25/20 2132 03/25/20 2222   BP: (!) 178/101    Pulse: 105    Resp: 20 18   Temp: 98.7 °F (37.1 °C)    TempSrc: Oral    SpO2: 100% 100%       Patient was given thefollowing medications:  Medications   lidocaine 4 % external patch 1 patch (1 patch Transdermal Patch Applied 3/25/20 2211)   insulin regular (HUMULIN R;NOVOLIN R) injection 10 Units (has no administration in time range)   oxyCODONE-acetaminophen (PERCOCET)  MG per tablet 1 tablet (1 tablet Oral Given 3/25/20 2155)           The patient tolerated their visit well. The patient and / or the familywere informed of the results of any tests, a time was given to answer questions.     FINAL IMPRESSION      1. COPD exacerbation ([facility] [address]          DISPOSITION/PLAN   DISPOSITION Decision To Discharge 03/25/2020 10:42:08 PM  The patient has stable vital signs does not appear to be in any distress plan is for discharge her blood sugar was elevated but that is likely because she was placed on steroids yesterday she will be discharged home after she is given a dose of insulin and told to follow-up with her regular doctor    PATIENT REFERRED TO:  Carrie Berrios, [address] [place] [address] 97 11            DISCHARGE MEDICATIONS:  New Prescriptions    No medications on file       DISCONTINUED MEDICATIONS:  Discontinued Medications    No medications on file              (Please note that portions of this note were completed with a voice recognition program.  Efforts were made to edit the dictations but occasionally words are mis-transcribed.)    Damián Gandhi MD (electronically signed)      Damián Gandhi MD  03/25/20 9201

## 2020-03-27 ENCOUNTER — CARE COORDINATION (OUTPATIENT)
Dept: CARE COORDINATION | Age: 65
End: 2020-03-27

## 2020-03-27 NOTE — CARE COORDINATION
you can take to protect yourself, see CDC's How to [place] for follow-up call in 3-5 days based on severity of symptoms and risk factors

## 2020-03-30 ENCOUNTER — TELEPHONE (OUTPATIENT)
Dept: PRIMARY CARE CLINIC | Age: 65
End: 2020-03-30

## 2020-03-30 RX ORDER — LOPERAMIDE HYDROCHLORIDE 2 MG/1
2 CAPSULE ORAL 2 TIMES DAILY PRN
Qty: 10 CAPSULE | Refills: 0 | Status: SHIPPED | OUTPATIENT
Start: 2020-03-30 | End: 2020-04-09

## 2020-03-31 RX ORDER — QUETIAPINE FUMARATE 25 MG/1
25-50 TABLET, FILM COATED ORAL NIGHTLY
Qty: 60 TABLET | Refills: 0 | Status: SHIPPED | OUTPATIENT
Start: 2020-03-31 | End: 2020-04-01 | Stop reason: SDUPTHER

## 2020-04-01 ENCOUNTER — VIRTUAL VISIT (OUTPATIENT)
Dept: PAIN MANAGEMENT | Age: 65
End: 2020-04-01
Payer: COMMERCIAL

## 2020-04-01 PROCEDURE — 2022F DILAT RTA XM EVC RTNOPTHY: CPT | Performed by: INTERNAL MEDICINE

## 2020-04-01 PROCEDURE — G8427 DOCREV CUR MEDS BY ELIG CLIN: HCPCS | Performed by: INTERNAL MEDICINE

## 2020-04-01 PROCEDURE — 3046F HEMOGLOBIN A1C LEVEL >9.0%: CPT | Performed by: INTERNAL MEDICINE

## 2020-04-01 PROCEDURE — 99213 OFFICE O/P EST LOW 20 MIN: CPT | Performed by: INTERNAL MEDICINE

## 2020-04-01 PROCEDURE — 3017F COLORECTAL CA SCREEN DOC REV: CPT | Performed by: INTERNAL MEDICINE

## 2020-04-01 RX ORDER — GABAPENTIN 300 MG/1
CAPSULE ORAL
Qty: 90 CAPSULE | Refills: 0 | Status: SHIPPED | OUTPATIENT
Start: 2020-04-01 | End: 2020-04-29 | Stop reason: SDUPTHER

## 2020-04-01 RX ORDER — QUETIAPINE FUMARATE 25 MG/1
25-50 TABLET, FILM COATED ORAL NIGHTLY
Qty: 60 TABLET | Refills: 0 | Status: SHIPPED | OUTPATIENT
Start: 2020-04-01 | End: 2020-04-29 | Stop reason: SDUPTHER

## 2020-04-01 RX ORDER — OXYCODONE AND ACETAMINOPHEN 7.5; 325 MG/1; MG/1
1 TABLET ORAL EVERY 6 HOURS PRN
Qty: 84 TABLET | Refills: 0 | Status: SHIPPED | OUTPATIENT
Start: 2020-04-01 | End: 2020-04-29 | Stop reason: SDUPTHER

## 2020-04-01 RX ORDER — DULOXETIN HYDROCHLORIDE 60 MG/1
60 CAPSULE, DELAYED RELEASE ORAL DAILY
Qty: 30 CAPSULE | Refills: 0 | Status: SHIPPED | OUTPATIENT
Start: 2020-04-01 | End: 2020-04-29 | Stop reason: SDUPTHER

## 2020-04-01 RX ORDER — METHOCARBAMOL 500 MG/1
500 TABLET, FILM COATED ORAL 2 TIMES DAILY
Qty: 60 TABLET | Refills: 0 | Status: SHIPPED | OUTPATIENT
Start: 2020-04-01 | End: 2020-04-29 | Stop reason: SDUPTHER

## 2020-04-01 RX ORDER — TOPIRAMATE 100 MG/1
100 TABLET, FILM COATED ORAL 2 TIMES DAILY
Qty: 60 TABLET | Refills: 0 | Status: SHIPPED | OUTPATIENT
Start: 2020-04-01 | End: 2020-04-29 | Stop reason: SDUPTHER

## 2020-04-01 NOTE — PROGRESS NOTES
(APRESOLINE) 50 MG tablet TAKE 1 TABLET BY MOUTH 2 TIMES DAILY (Patient not taking: Reported on 4/1/2020) 60 tablet 5    omeprazole (PRILOSEC) 20 MG delayed release capsule TAKE 1 CAPSULE BY MOUTH DAILY (Patient not taking: Reported on 4/1/2020) 30 capsule 1    Lancet Devices (SIMPLE DIAGNOSTICS LANCING DEV) MISC USE WITH LANCETS TO TEST BLOOD GLUCOSE (Patient not taking: Reported on 4/1/2020) 1 each 11    Blood Glucose Monitoring Suppl (TRUE METRIX METER) w/Device KIT USE TO TEST BLOOD GLUCOSE (Patient not taking: Reported on 4/1/2020) 1 kit 0    TRUE METRIX BLOOD GLUCOSE TEST strip USE TO TEST BLOOD GLUCOSE THREE TIMES DAILY (Patient not taking: Reported on 4/1/2020) 250 each 5    Pharmacist Choice Lancets MISC USE TO TEST BLOOD GLUCOSE THREE TIMES DAILY (Patient not taking: Reported on 4/1/2020) 300 each 5    UltiCare Alcohol Swabs 70 % PADS USE TO TEST BLOOD GLUCOSE THREE TIMES DAILY (Patient not taking: Reported on 4/1/2020) 100 each 5    DULoxetine (CYMBALTA) 60 MG extended release capsule Take 1 capsule by mouth daily (Patient not taking: Reported on 4/1/2020) 30 capsule 0    topiramate (TOPAMAX) 100 MG tablet Take 1 tablet by mouth 2 times daily (Patient not taking: Reported on 4/1/2020) 60 tablet 0    oxyCODONE-acetaminophen (PERCOCET) 7.5-325 MG per tablet Take 1 tablet by mouth every 6 hours as needed for Pain (max 3/day) for up to 28 days.  (Patient not taking: Reported on 4/1/2020) 84 tablet 0    methocarbamol (ROBAXIN) 500 MG tablet Take 1 tablet by mouth 2 times daily (Patient not taking: Reported on 4/1/2020) 60 tablet 0    gabapentin (NEURONTIN) 300 MG capsule One tab hs 1 week, 2 tabs hs 1 week, 1 tab am 2 tabs pm (Patient not taking: Reported on 4/1/2020) 90 capsule 3    UNIFINE PENTIPS 31G X 8 MM MISC USE WITH insulin pens (Patient not taking: Reported on 4/1/2020) 100 each 5    BASAGLAR KWIKPEN 100 UNIT/ML injection pen INJECT 60 UNITS INTO THE SKIN 2 TIMES DAILY (Patient not taking: Reported on 4/1/2020) 15 mL 5    budesonide-formoterol (SYMBICORT) 160-4.5 MCG/ACT AERO Inhale 2 puffs into the lungs daily (Patient not taking: Reported on 4/1/2020) 2 Inhaler 5    albuterol sulfate HFA (VENTOLIN HFA) 108 (90 Base) MCG/ACT inhaler Inhale 2 puffs into the lungs every 4 hours as needed for Wheezing or Shortness of Breath (Patient not taking: Reported on 4/1/2020) 3 Inhaler 5    torsemide (DEMADEX) 10 MG tablet TAKE TWO TABLETS BY MOUTH DAILY (Patient not taking: Reported on 4/1/2020) 60 tablet 5    ranolazine (RANEXA) 1000 MG extended release tablet Take 1 tablet by mouth 2 times daily (Patient not taking: Reported on 4/1/2020) 60 tablet 8    clopidogrel (PLAVIX) 75 MG tablet TAKE 1 TABLET BY MOUTH DA JULIEN (Patient not taking: Reported on 4/1/2020) 90 tablet 5    metoprolol succinate (TOPROL XL) 50 MG extended release tablet Take 0.5 tablets by mouth 2 times daily (with meals) (Patient not taking: Reported on 4/1/2020) 30 tablet 5    amLODIPine (NORVASC) 5 MG tablet Take 1 tablet by mouth daily (Patient not taking: Reported on 4/1/2020) 90 tablet 5    atorvastatin (LIPITOR) 80 MG tablet Take 1 tablet by mouth daily (Patient not taking: Reported on 4/1/2020) 90 tablet 3    aspirin (ASPIRIN LOW DOSE) 81 MG EC tablet Take 1 tablet by mouth daily (Patient not taking: Reported on 4/1/2020) 90 tablet 5    isosorbide mononitrate (IMDUR) 30 MG extended release tablet Take 1 tablet by mouth daily (Patient not taking: Reported on 4/1/2020) 90 tablet 5    nitroGLYCERIN (NITROSTAT) 0.4 MG SL tablet Place 1 tablet under the tongue every 5 minutes as needed for Chest pain up to max of 3 total doses. If no relief after 1 dose, call 911. (Patient not taking: Reported on 4/1/2020) 25 tablet 3     No facility-administered medications prior to visit. SOCIAL/FAMILY/PAST MEDICAL HISTORY: Ms. Mathis Kawasaki, family and past medical history was reviewed.      REVIEW OF SYSTEMS:    Respiratory: Negative for apnea, chest tightness and shortness of breath or change in baseline breathing. Gastrointestinal: Negative for nausea, vomiting, abdominal pain, diarrhea, constipation, blood in stool and abdominal distention. PHYSICAL EXAM:   Nursing note and vitals per patient reviewed. There were no vitals taken for this visit. Constitutional: She appears well-developed and well-nourished. No acute distress. No respiratory distress. Skin: Skin appears to be warm and dry. No rashes or any other marks noted. She is not diaphoretic. Neurological/Psychiatric:She is alert and oriented to person, place, and time. Coordination is  normal.  Her mood isAppropriate and affect is Flat/blunted and Anxious. Her behavior is normal.   thought content normal.   Musculoskeletal / Extremities: Range of motion is normal. Gait is normal, assistive devices use: none. IMPRESSION:   1. Chronic pain syndrome    2. DDD (degenerative disc disease), lumbar    3. Fibromyalgia    4. DDD (degenerative disc disease), cervical    5. Rotator cuff tendonitis, right    6.  Chronic painful diabetic neuropathy ([facility] [address]        PLAN:  Informed verbal consent regarding treatment was obtained  -records from the hospital reviewed  -labs reviewed; shows elevated creatine and blood sugar, she was on steroids  -continue with Percocet 3 per day  -walking as tolerated   -f/u with PCP regarding elevate blood sugars  -no NSAID's     Current Outpatient Medications   Medication Sig Dispense Refill    QUEtiapine (SEROQUEL) 25 MG tablet TAKE 1-2 TABLETS BY MOUTH NIGHTLY (Patient not taking: Reported on 4/1/2020) 60 tablet 0    loperamide (RA ANTI-DIARRHEAL) 2 MG capsule Take 1 capsule by mouth 2 times daily as needed for Diarrhea (Patient not taking: Reported on 4/1/2020) 10 capsule 0    Nebulizers (COMPRESSOR/NEBULIZER) MISC 1 Device by Does not apply route 4 times daily as needed (SOB / Wheezing) (Patient not taking: Reported on 4/1/2020) 1 each 0    albuterol (PROVENTIL) (2.5 MG/3ML) 0.083% nebulizer solution Take 3 mLs by nebulization every 4 hours as needed for Wheezing (Patient not taking: Reported on 4/1/2020) 120 each 5    lidocaine (LIDODERM) 5 % Place 1 patch onto the skin daily 12 hours on, 12 hours off. (Patient not taking: Reported on 4/1/2020) 30 patch 0    albuterol sulfate HFA (VENTOLIN HFA) 108 (90 Base) MCG/ACT inhaler Inhale 2 puffs into the lungs every 4 hours as needed for Wheezing (Patient not taking: Reported on 4/1/2020) 1 Inhaler 0    hydrALAZINE (APRESOLINE) 50 MG tablet TAKE 1 TABLET BY MOUTH 2 TIMES DAILY (Patient not taking: Reported on 4/1/2020) 60 tablet 5    omeprazole (PRILOSEC) 20 MG delayed release capsule TAKE 1 CAPSULE BY MOUTH DAILY (Patient not taking: Reported on 4/1/2020) 30 capsule 1    Lancet Devices ([employer] DIAGNOSTICS LANCING DEV) MISC USE WITH LANCETS TO TEST BLOOD GLUCOSE (Patient not taking: Reported on 4/1/2020) 1 each 11    Blood Glucose Monitoring Suppl (TRUE METRIX METER) w/Device KIT USE TO TEST BLOOD GLUCOSE (Patient not taking: Reported on 4/1/2020) 1 kit 0    TRUE METRIX BLOOD GLUCOSE TEST strip USE TO TEST BLOOD GLUCOSE THREE TIMES DAILY (Patient not taking: Reported on 4/1/2020) 250 each 5    Pharmacist Choice Lancets MISC USE TO TEST BLOOD GLUCOSE THREE TIMES DAILY (Patient not taking: Reported on 4/1/2020) 300 each 5    UltiCare Alcohol Swabs 70 % PADS USE TO TEST BLOOD GLUCOSE THREE TIMES DAILY (Patient not taking: Reported on 4/1/2020) 100 each 5    DULoxetine (CYMBALTA) 60 MG extended release capsule Take 1 capsule by mouth daily (Patient not taking: Reported on 4/1/2020) 30 capsule 0    topiramate (TOPAMAX) 100 MG tablet Take 1 tablet by mouth 2 times daily (Patient not taking: Reported on 4/1/2020) 60 tablet 0    oxyCODONE-acetaminophen (PERCOCET) 7.5-325 MG per tablet Take 1 tablet by mouth every 6 hours as needed for Pain (max 3/day) for up to 28 days.  (Patient not taking: Reported on 4/1/2020)

## 2020-04-06 ENCOUNTER — HOSPITAL ENCOUNTER (EMERGENCY)
Age: 65
Discharge: ANOTHER HOSPITAL | End: 2020-04-06
Attending: EMERGENCY MEDICINE
Payer: COMMERCIAL

## 2020-04-06 ENCOUNTER — NURSE TRIAGE (OUTPATIENT)
Dept: OTHER | Facility: CLINIC | Age: 65
End: 2020-04-06

## 2020-04-06 ENCOUNTER — APPOINTMENT (OUTPATIENT)
Dept: GENERAL RADIOLOGY | Age: 65
End: 2020-04-06
Payer: COMMERCIAL

## 2020-04-06 ENCOUNTER — CARE COORDINATION (OUTPATIENT)
Dept: CARE COORDINATION | Age: 65
End: 2020-04-06

## 2020-04-06 VITALS
DIASTOLIC BLOOD PRESSURE: 68 MMHG | RESPIRATION RATE: 16 BRPM | HEIGHT: 60 IN | HEART RATE: 59 BPM | SYSTOLIC BLOOD PRESSURE: 163 MMHG | TEMPERATURE: 97.6 F | OXYGEN SATURATION: 100 % | WEIGHT: 120.37 LBS | BODY MASS INDEX: 23.63 KG/M2

## 2020-04-06 LAB
ANION GAP SERPL CALCULATED.3IONS-SCNC: 15 MMOL/L (ref 3–16)
BASOPHILS ABSOLUTE: 0 K/UL (ref 0–0.2)
BASOPHILS RELATIVE PERCENT: 0.5 %
BUN BLDV-MCNC: 29 MG/DL (ref 7–20)
CALCIUM SERPL-MCNC: 9 MG/DL (ref 8.3–10.6)
CHLORIDE BLD-SCNC: 99 MMOL/L (ref 99–110)
CO2: 20 MMOL/L (ref 21–32)
CREAT SERPL-MCNC: 1.5 MG/DL (ref 0.6–1.2)
EOSINOPHILS ABSOLUTE: 0.2 K/UL (ref 0–0.6)
EOSINOPHILS RELATIVE PERCENT: 2.8 %
GFR AFRICAN AMERICAN: 42
GFR NON-AFRICAN AMERICAN: 35
GLUCOSE BLD-MCNC: 276 MG/DL (ref 70–99)
GLUCOSE BLD-MCNC: 325 MG/DL (ref 70–99)
HCT VFR BLD CALC: 29.5 % (ref 36–48)
HEMOGLOBIN: 9.8 G/DL (ref 12–16)
LYMPHOCYTES ABSOLUTE: 1.8 K/UL (ref 1–5.1)
LYMPHOCYTES RELATIVE PERCENT: 26.4 %
MCH RBC QN AUTO: 32.6 PG (ref 26–34)
MCHC RBC AUTO-ENTMCNC: 33.1 G/DL (ref 31–36)
MCV RBC AUTO: 98.4 FL (ref 80–100)
MONOCYTES ABSOLUTE: 0.4 K/UL (ref 0–1.3)
MONOCYTES RELATIVE PERCENT: 5.2 %
NEUTROPHILS ABSOLUTE: 4.4 K/UL (ref 1.7–7.7)
NEUTROPHILS RELATIVE PERCENT: 65.1 %
PDW BLD-RTO: 14.7 % (ref 12.4–15.4)
PERFORMED ON: ABNORMAL
PLATELET # BLD: 153 K/UL (ref 135–450)
PMV BLD AUTO: 10 FL (ref 5–10.5)
POTASSIUM SERPL-SCNC: 5.4 MMOL/L (ref 3.5–5.1)
PRO-BNP: 1446 PG/ML (ref 0–124)
RBC # BLD: 3 M/UL (ref 4–5.2)
SODIUM BLD-SCNC: 134 MMOL/L (ref 136–145)
TROPONIN: <0.01 NG/ML
WBC # BLD: 6.8 K/UL (ref 4–11)

## 2020-04-06 PROCEDURE — 71045 X-RAY EXAM CHEST 1 VIEW: CPT

## 2020-04-06 PROCEDURE — 36415 COLL VENOUS BLD VENIPUNCTURE: CPT

## 2020-04-06 PROCEDURE — 93005 ELECTROCARDIOGRAM TRACING: CPT | Performed by: EMERGENCY MEDICINE

## 2020-04-06 PROCEDURE — 99285 EMERGENCY DEPT VISIT HI MDM: CPT

## 2020-04-06 PROCEDURE — 96372 THER/PROPH/DIAG INJ SC/IM: CPT

## 2020-04-06 PROCEDURE — 85025 COMPLETE CBC W/AUTO DIFF WBC: CPT

## 2020-04-06 PROCEDURE — 83880 ASSAY OF NATRIURETIC PEPTIDE: CPT

## 2020-04-06 PROCEDURE — 80048 BASIC METABOLIC PNL TOTAL CA: CPT

## 2020-04-06 PROCEDURE — 6370000000 HC RX 637 (ALT 250 FOR IP): Performed by: EMERGENCY MEDICINE

## 2020-04-06 PROCEDURE — 84484 ASSAY OF TROPONIN QUANT: CPT

## 2020-04-06 RX ORDER — ONDANSETRON 4 MG/1
4 TABLET, ORALLY DISINTEGRATING ORAL ONCE
Status: COMPLETED | OUTPATIENT
Start: 2020-04-06 | End: 2020-04-06

## 2020-04-06 RX ORDER — OXYCODONE HYDROCHLORIDE AND ACETAMINOPHEN 5; 325 MG/1; MG/1
1 TABLET ORAL ONCE
Status: COMPLETED | OUTPATIENT
Start: 2020-04-06 | End: 2020-04-06

## 2020-04-06 RX ORDER — OXYCODONE HYDROCHLORIDE AND ACETAMINOPHEN 5; 325 MG/1; MG/1
2 TABLET ORAL ONCE
Status: COMPLETED | OUTPATIENT
Start: 2020-04-06 | End: 2020-04-06

## 2020-04-06 RX ORDER — NITROGLYCERIN 0.4 MG/1
0.4 TABLET SUBLINGUAL ONCE
Status: COMPLETED | OUTPATIENT
Start: 2020-04-06 | End: 2020-04-06

## 2020-04-06 RX ORDER — HYDRALAZINE HYDROCHLORIDE 25 MG/1
50 TABLET, FILM COATED ORAL ONCE
Status: DISCONTINUED | OUTPATIENT
Start: 2020-04-06 | End: 2020-04-06

## 2020-04-06 RX ADMIN — ONDANSETRON 4 MG: 4 TABLET, ORALLY DISINTEGRATING ORAL at 17:03

## 2020-04-06 RX ADMIN — OXYCODONE HYDROCHLORIDE AND ACETAMINOPHEN 1 TABLET: 5; 325 TABLET ORAL at 17:31

## 2020-04-06 RX ADMIN — OXYCODONE HYDROCHLORIDE AND ACETAMINOPHEN 2 TABLET: 5; 325 TABLET ORAL at 18:02

## 2020-04-06 RX ADMIN — INSULIN HUMAN 10 UNITS: 100 INJECTION, SOLUTION PARENTERAL at 17:44

## 2020-04-06 RX ADMIN — NITROGLYCERIN 0.4 MG: 0.4 TABLET SUBLINGUAL at 16:30

## 2020-04-06 RX ADMIN — NITROGLYCERIN 0.4 MG: 0.4 TABLET, ORALLY DISINTEGRATING SUBLINGUAL at 17:07

## 2020-04-06 ASSESSMENT — ENCOUNTER SYMPTOMS
COUGH: 0
APNEA: 0
STRIDOR: 0
CHEST TIGHTNESS: 0
SHORTNESS OF BREATH: 0
EYE ITCHING: 0
EYE REDNESS: 0
CHOKING: 0
WHEEZING: 0
EYE PAIN: 0
ABDOMINAL DISTENTION: 0
EYE DISCHARGE: 0
PHOTOPHOBIA: 0

## 2020-04-06 ASSESSMENT — PAIN SCALES - GENERAL: PAINLEVEL_OUTOF10: 8

## 2020-04-06 NOTE — ED PROVIDER NOTES
[id number] [facility]  eMERGENCY dEPARTMENT eNCOUnter      Pt Name: Sammy Peguero  MRN: [id number]  [place] 1955  Date of evaluation: 4/6/2020  Provider: Melanie Garza MD    CHIEF COMPLAINT       Chief Complaint   Patient presents with    Chest Pain     started about 1 hour ago while doing dishes mid sternal to lf shoulder down lf arm with nausea       HISTORY OF PRESENT ILLNESS    Sammy Peguero is a 62 y.o. female who presents to the emergency department with chest pain. Left sided chest pain radiating down the left arm. No SOB with pain. Hx CAD (2 MIs and 6 stents) with her most recent [facility] on 1/6/2020 at [address] did not reveal any obstructive CAD, no need for intervention. Saw  cardiac surgery Dr Nikunj Christopher who did not recommend any cardiac intervention for CABG on 2/2/8/20. States her left sided chest pain started 1 hour prior to arrival. Pain is 9/10 sharp in nature. Hasn't taken anything for pain. Nothing makes symptoms better or worse. No other associated symptoms. Nursing Notes were reviewed. REVIEW OF SYSTEMS       Review of Systems   Constitutional: Negative for activity change, appetite change, chills, diaphoresis, fatigue, fever and unexpected weight change. HENT: Negative for congestion, dental problem, drooling and ear discharge. Eyes: Negative for photophobia, pain, discharge, redness and itching. Respiratory: Negative for apnea, cough, choking, chest tightness, shortness of breath, wheezing and stridor. Cardiovascular: Positive for chest pain. Negative for leg swelling. Gastrointestinal: Negative for abdominal distention. Endocrine: Negative for polyphagia and polyuria. Genitourinary: Negative for vaginal bleeding, vaginal discharge and vaginal pain. Musculoskeletal: Negative for arthralgias. Neurological: Negative for dizziness, facial asymmetry and headaches. Hematological: Negative for adenopathy. Does not bruise/bleed easily. HFA) 108 (90 BASE) MCG/ACT INHALER    Inhale 2 puffs into the lungs every 4 hours as needed for Wheezing or Shortness of Breath    ALBUTEROL SULFATE HFA (VENTOLIN HFA) 108 (90 BASE) MCG/ACT INHALER    Inhale 2 puffs into the lungs every 4 hours as needed for Wheezing    AMLODIPINE (NORVASC) 5 MG TABLET    Take 1 tablet by mouth daily    ASPIRIN (ASPIRIN LOW DOSE) 81 MG EC TABLET    Take 1 tablet by mouth daily    ATORVASTATIN (LIPITOR) 80 MG TABLET    Take 1 tablet by mouth daily    BASAGLAR KWIKPEN 100 UNIT/ML INJECTION PEN    INJECT 60 UNITS INTO THE SKIN 2 TIMES DAILY    BLOOD GLUCOSE MONITORING SUPPL (TRUE METRIX METER) W/DEVICE KIT    USE TO TEST BLOOD GLUCOSE    BUDESONIDE-FORMOTEROL (SYMBICORT) 160-4.5 MCG/ACT AERO    Inhale 2 puffs into the lungs daily    CLOPIDOGREL (PLAVIX) 75 MG TABLET    TAKE 1 TABLET BY MOUTH DA JULIEN    DULOXETINE (CYMBALTA) 60 MG EXTENDED RELEASE CAPSULE    Take 1 capsule by mouth daily    GABAPENTIN (NEURONTIN) 300 MG CAPSULE    take1 tab am, take 2 tabs pm    HYDRALAZINE (APRESOLINE) 50 MG TABLET    TAKE 1 TABLET BY MOUTH 2 TIMES DAILY    ISOSORBIDE MONONITRATE (IMDUR) 30 MG EXTENDED RELEASE TABLET    Take 1 tablet by mouth daily    LANCET DEVICES (SIMPLE DIAGNOSTICS LANCING DEV) MISC    USE WITH LANCETS TO TEST BLOOD GLUCOSE    LIDOCAINE (LIDODERM) 5 %    Place 1 patch onto the skin daily 12 hours on, 12 hours off.     LOPERAMIDE (RA ANTI-DIARRHEAL) 2 MG CAPSULE    Take 1 capsule by mouth 2 times daily as needed for Diarrhea    METHOCARBAMOL (ROBAXIN) 500 MG TABLET    Take 1 tablet by mouth 2 times daily    METOPROLOL SUCCINATE (TOPROL XL) 50 MG EXTENDED RELEASE TABLET    Take 0.5 tablets by mouth 2 times daily (with meals)    NEBULIZERS (COMPRESSOR/NEBULIZER) MISC    1 Device by Does not apply route 4 times daily as needed (SOB / Wheezing)    NITROGLYCERIN (NITROSTAT) 0.4 MG SL TABLET    Place 1 tablet under the tongue every 5 minutes as needed for Chest pain up to max of 3 total doses. If no relief after 1 dose, call 911. OMEPRAZOLE (PRILOSEC) 20 MG DELAYED RELEASE CAPSULE    TAKE 1 CAPSULE BY MOUTH DAILY    OXYCODONE-ACETAMINOPHEN (PERCOCET) 7.5-325 MG PER TABLET    Take 1 tablet by mouth every 6 hours as needed for Pain (max 3/day) for up to 28 days. PHARMACIST CHOICE LANCETS MISC    USE TO TEST BLOOD GLUCOSE THREE TIMES DAILY    QUETIAPINE (SEROQUEL) 25 MG TABLET    Take 1-2 tablets by mouth nightly    RANOLAZINE (RANEXA) 1000 MG EXTENDED RELEASE TABLET    Take 1 tablet by mouth 2 times daily    TOPIRAMATE (TOPAMAX) 100 MG TABLET    Take 1 tablet by mouth 2 times daily    TORSEMIDE (DEMADEX) 10 MG TABLET    TAKE TWO TABLETS BY MOUTH DAILY    TRUE METRIX BLOOD GLUCOSE TEST STRIP    USE TO TEST BLOOD GLUCOSE THREE TIMES DAILY    ULTICARE ALCOHOL SWABS 70 % PADS    USE TO TEST BLOOD GLUCOSE THREE TIMES DAILY    UNIFINE PENTIPS 31G X 8 MM MISC    USE WITH insulin pens       ALLERGIES     Patient has no known allergies. FAMILY HISTORY        Family History   Problem Relation Age of Onset    Diabetes Mother     Hypertension Mother     High Cholesterol Mother     Stroke Mother     Cancer Mother     No Known Problems Paternal Grandfather         lung issues        SOCIAL HISTORY       Social History     Socioeconomic History    Marital status:       Spouse name: Not on file    Number of children: 6    Years of education: Not on file    Highest education level: Not on file   Occupational History    Not on file   Social Needs    Financial resource strain: Not on file    Food insecurity     Worry: Not on file     Inability: Not on file    Transportation needs     Medical: Not on file     Non-medical: Not on file   Tobacco Use    Smoking status: Former Smoker     Packs/day: 0.50     Years: 35.00     Pack years: 17.50     Types: Cigarettes     Last attempt to quit: 2018     Years since quittin.7    Smokeless tobacco: Former User    Tobacco comment: 5/13/15 has not smoked since hospitalization -    Substance and Sexual Activity    Alcohol use: No     Alcohol/week: 0.0 standard drinks    Drug use: No    Sexual activity: Yes     Partners: Male   Lifestyle    Physical activity     Days per week: Not on file     Minutes per session: Not on file    Stress: Not on file   Relationships    Social connections     Talks on phone: Not on file     Gets together: Not on file     Attends Synagogue service: Not on file     Active member of club or organization: Not on file     Attends meetings of clubs or organizations: Not on file     Relationship status: Not on file    Intimate partner violence     Fear of current or ex partner: Not on file     Emotionally abused: Not on file     Physically abused: Not on file     Forced sexual activity: Not on file   Other Topics Concern    Not on file   Social History Narrative    Not on file       PHYSICAL EXAM       ED Triage Vitals [04/06/20 1545]   BP Temp Temp Source Pulse Resp SpO2 Height Weight   (!) 197/71 97.6 °F (36.4 °C) Oral 63 18 100 % 5' (1.524 m) 120 lb 5.9 oz (54.6 kg)       Physical Exam  Constitutional:       General: She is not in acute distress. Appearance: She is well-developed. She is not diaphoretic. HENT:      Head: Normocephalic and atraumatic. Right Ear: External ear normal.      Left Ear: External ear normal.   Eyes:      General:         Right eye: No discharge. Left eye: No discharge. Conjunctiva/sclera: Conjunctivae normal.      Pupils: Pupils are equal, round, and reactive to light. Neck:      Musculoskeletal: Normal range of motion and neck supple. Cardiovascular:      Rate and Rhythm: Normal rate and regular rhythm. Heart sounds: No murmur. Pulmonary:      Effort: Pulmonary effort is normal. No respiratory distress. Breath sounds: Normal breath sounds. No wheezing or rales. Abdominal:      General: Bowel sounds are normal. There is no distension.       Palpations: Abdomen is soft. There is no mass. Tenderness: There is no abdominal tenderness. There is no guarding or rebound. Musculoskeletal: Normal range of motion. General: No deformity. Skin:     General: Skin is warm. Findings: No erythema or rash. Neurological:      Mental Status: She is alert and oriented to person, place, and time. She is not disoriented. Cranial Nerves: No cranial nerve deficit. Motor: No atrophy or abnormal muscle tone. Coordination: Coordination normal.   Psychiatric:         Behavior: Behavior normal.         Thought Content:  Thought content normal.         DIAGNOSTIC RESULTS     EKG: All EKG's are interpreted by the Emergency Department Physician who either signs or Co-signs this chart in the absence of acardiologist.    EKG shows NSR with short AZ interval no ectopy no stemi noted nonspecific EKG changes     RADIOLOGY:   Non-plain film images such as CT, Ultrasoundand MRI are read by the radiologist. Plain radiographic images are visualized and preliminarily interpreted by the emergency physician with the below findings:    CXR clear      ED BEDSIDE ULTRASOUND:   Performed by ED Physician - none    LABS:  Labs Reviewed   CBC WITH AUTO DIFFERENTIAL - Abnormal; Notable for the following components:       Result Value    RBC 3.00 (*)     Hemoglobin 9.8 (*)     Hematocrit 29.5 (*)     All other components within normal limits    Narrative:     Performed at:  [facility]  [address] [facility]   Phone [phone]   BASIC METABOLIC PANEL - Abnormal; Notable for the following components:    Sodium 134 (*)     Potassium 5.4 (*)     CO2 20 (*)     Glucose 325 (*)     BUN 29 (*)     CREATININE 1.5 (*)     GFR Non- 35 (*)     GFR  42 (*)     All other components within normal limits    Narrative:     Performed at:  [address] [facility]  [address]

## 2020-04-07 LAB
EKG ATRIAL RATE: 61 BPM
EKG DIAGNOSIS: NORMAL
EKG P AXIS: -8 DEGREES
EKG P-R INTERVAL: 94 MS
EKG Q-T INTERVAL: 448 MS
EKG QRS DURATION: 84 MS
EKG QTC CALCULATION (BAZETT): 450 MS
EKG R AXIS: 39 DEGREES
EKG T AXIS: 68 DEGREES
EKG VENTRICULAR RATE: 61 BPM

## 2020-04-07 PROCEDURE — 93010 ELECTROCARDIOGRAM REPORT: CPT | Performed by: INTERNAL MEDICINE

## 2020-04-13 RX ORDER — TORSEMIDE 10 MG/1
TABLET ORAL
Qty: 60 TABLET | Refills: 5 | Status: SHIPPED | OUTPATIENT
Start: 2020-04-13 | End: 2020-09-14

## 2020-04-14 ENCOUNTER — TELEPHONE (OUTPATIENT)
Dept: INTERNAL MEDICINE CLINIC | Age: 65
End: 2020-04-14

## 2020-04-14 RX ORDER — DIPHENOXYLATE HYDROCHLORIDE AND ATROPINE SULFATE 2.5; .025 MG/1; MG/1
1 TABLET ORAL 3 TIMES DAILY PRN
Qty: 30 TABLET | Refills: 1 | Status: SHIPPED | OUTPATIENT
Start: 2020-04-14 | End: 2020-05-14

## 2020-04-14 NOTE — TELEPHONE ENCOUNTER
Diarrhea, dizzy , walking off balance. Chest pain on and off. Took 2 nitro niraj relieve. ..  please call franko back at [phone]

## 2020-04-14 NOTE — TELEPHONE ENCOUNTER
She was in ER 4/6/20 with Chest Pains     No obstructive cAD at this time . Last angiogram 2/20    Diarrhoea appears Chronic . She is on Lomotil .  Needs to see GI     BP was high on recent exam . Advise her to take all meds as prescribed + low salt diet

## 2020-04-15 ENCOUNTER — TELEPHONE (OUTPATIENT)
Dept: PRIMARY CARE CLINIC | Age: 65
End: 2020-04-15

## 2020-04-21 ENCOUNTER — TELEPHONE (OUTPATIENT)
Dept: INTERNAL MEDICINE CLINIC | Age: 65
End: 2020-04-21

## 2020-04-21 RX ORDER — INSULIN LISPRO 100 [IU]/ML
2-10 INJECTION, SOLUTION INTRAVENOUS; SUBCUTANEOUS
Qty: 6 PEN | Refills: 5 | Status: SHIPPED | OUTPATIENT
Start: 2020-04-21 | End: 2020-05-14

## 2020-04-21 NOTE — TELEPHONE ENCOUNTER
franko called from [facility] - her blood sugar today was 201. Anything you would like to do.  Please call franko back at [phone]

## 2020-04-24 ENCOUNTER — TELEPHONE (OUTPATIENT)
Dept: ADMINISTRATIVE | Age: 65
End: 2020-04-24

## 2020-04-24 ENCOUNTER — TELEPHONE (OUTPATIENT)
Dept: PAIN MANAGEMENT | Age: 65
End: 2020-04-24

## 2020-04-29 ENCOUNTER — TELEPHONE (OUTPATIENT)
Dept: INTERNAL MEDICINE CLINIC | Age: 65
End: 2020-04-29

## 2020-04-29 ENCOUNTER — VIRTUAL VISIT (OUTPATIENT)
Dept: PAIN MANAGEMENT | Age: 65
End: 2020-04-29
Payer: COMMERCIAL

## 2020-04-29 PROCEDURE — 3046F HEMOGLOBIN A1C LEVEL >9.0%: CPT | Performed by: INTERNAL MEDICINE

## 2020-04-29 PROCEDURE — 3017F COLORECTAL CA SCREEN DOC REV: CPT | Performed by: INTERNAL MEDICINE

## 2020-04-29 PROCEDURE — G8428 CUR MEDS NOT DOCUMENT: HCPCS | Performed by: INTERNAL MEDICINE

## 2020-04-29 PROCEDURE — 99213 OFFICE O/P EST LOW 20 MIN: CPT | Performed by: INTERNAL MEDICINE

## 2020-04-29 PROCEDURE — 2022F DILAT RTA XM EVC RTNOPTHY: CPT | Performed by: INTERNAL MEDICINE

## 2020-04-29 RX ORDER — GABAPENTIN 300 MG/1
CAPSULE ORAL
Qty: 90 CAPSULE | Refills: 0 | Status: SHIPPED | OUTPATIENT
Start: 2020-04-29 | End: 2020-05-27 | Stop reason: SDUPTHER

## 2020-04-29 RX ORDER — TOPIRAMATE 100 MG/1
100 TABLET, FILM COATED ORAL 2 TIMES DAILY
Qty: 60 TABLET | Refills: 0 | Status: SHIPPED | OUTPATIENT
Start: 2020-04-29 | End: 2020-05-27

## 2020-04-29 RX ORDER — QUETIAPINE FUMARATE 25 MG/1
25-50 TABLET, FILM COATED ORAL NIGHTLY
Qty: 60 TABLET | Refills: 0 | Status: SHIPPED | OUTPATIENT
Start: 2020-04-29 | End: 2020-05-27 | Stop reason: SDUPTHER

## 2020-04-29 RX ORDER — DULOXETIN HYDROCHLORIDE 60 MG/1
60 CAPSULE, DELAYED RELEASE ORAL DAILY
Qty: 30 CAPSULE | Refills: 0 | Status: SHIPPED | OUTPATIENT
Start: 2020-04-29 | End: 2020-05-27 | Stop reason: SDUPTHER

## 2020-04-29 RX ORDER — OXYCODONE AND ACETAMINOPHEN 7.5; 325 MG/1; MG/1
1 TABLET ORAL EVERY 6 HOURS PRN
Qty: 84 TABLET | Refills: 0 | Status: SHIPPED | OUTPATIENT
Start: 2020-04-29 | End: 2020-05-27 | Stop reason: SDUPTHER

## 2020-04-29 RX ORDER — METHOCARBAMOL 500 MG/1
500 TABLET, FILM COATED ORAL 2 TIMES DAILY
Qty: 60 TABLET | Refills: 0 | Status: SHIPPED | OUTPATIENT
Start: 2020-04-29 | End: 2020-05-27 | Stop reason: SDUPTHER

## 2020-04-29 NOTE — TELEPHONE ENCOUNTER
Numbness right arm and leg. Baby finger totally numb. Per PT she is not walking as well as was she last week. Her vital signs are good. No cognitive impairment. Is there anything else they can do?

## 2020-04-29 NOTE — PROGRESS NOTES
tablet 0    methocarbamol (ROBAXIN) 500 MG tablet Take 1 tablet by mouth 2 times daily 60 tablet 0    gabapentin (NEURONTIN) 300 MG capsule take1 tab am, take 2 tabs pm 90 capsule 0    Nebulizers (COMPRESSOR/NEBULIZER) MISC 1 Device by Does not apply route 4 times daily as needed (SOB / Wheezing) 1 each 0    albuterol (PROVENTIL) (2.5 MG/3ML) 0.083% nebulizer solution Take 3 mLs by nebulization every 4 hours as needed for Wheezing 120 each 5    lidocaine (LIDODERM) 5 % Place 1 patch onto the skin daily 12 hours on, 12 hours off.  30 patch 0    albuterol sulfate HFA (VENTOLIN HFA) 108 (90 Base) MCG/ACT inhaler Inhale 2 puffs into the lungs every 4 hours as needed for Wheezing 1 Inhaler 0    hydrALAZINE (APRESOLINE) 50 MG tablet TAKE 1 TABLET BY MOUTH 2 TIMES DAILY 60 tablet 5    omeprazole (PRILOSEC) 20 MG delayed release capsule TAKE 1 CAPSULE BY MOUTH DAILY 30 capsule 1    Lancet Devices (SIMPLE DIAGNOSTICS LANCING DEV) MISC USE WITH LANCETS TO TEST BLOOD GLUCOSE 1 each 11    Blood Glucose Monitoring Suppl (TRUE METRIX METER) w/Device KIT USE TO TEST BLOOD GLUCOSE 1 kit 0    TRUE METRIX BLOOD GLUCOSE TEST strip USE TO TEST BLOOD GLUCOSE THREE TIMES DAILY 250 each 5    Pharmacist Choice Lancets MISC USE TO TEST BLOOD GLUCOSE THREE TIMES DAILY 300 each 5    UltiCare Alcohol Swabs 70 % PADS USE TO TEST BLOOD GLUCOSE THREE TIMES DAILY 100 each 5    UNIFINE PENTIPS 31G X 8 MM MISC USE WITH insulin pens 100 each 5    BASAGLAR KWIKPEN 100 UNIT/ML injection pen INJECT 60 UNITS INTO THE SKIN 2 TIMES DAILY 15 mL 5    budesonide-formoterol (SYMBICORT) 160-4.5 MCG/ACT AERO Inhale 2 puffs into the lungs daily 2 Inhaler 5    albuterol sulfate HFA (VENTOLIN HFA) 108 (90 Base) MCG/ACT inhaler Inhale 2 puffs into the lungs every 4 hours as needed for Wheezing or Shortness of Breath 3 Inhaler 5    ranolazine (RANEXA) 1000 MG extended release tablet Take 1 tablet by mouth 2 times daily 60 tablet 8    clopidogrel (PLAVIX) 75 MG tablet TAKE 1 TABLET BY MOUTH DA JULIEN 90 tablet 5    metoprolol succinate (TOPROL XL) 50 MG extended release tablet Take 0.5 tablets by mouth 2 times daily (with meals) 30 tablet 5    amLODIPine (NORVASC) 5 MG tablet Take 1 tablet by mouth daily 90 tablet 5    atorvastatin (LIPITOR) 80 MG tablet Take 1 tablet by mouth daily 90 tablet 3    aspirin (ASPIRIN LOW DOSE) 81 MG EC tablet Take 1 tablet by mouth daily 90 tablet 5    isosorbide mononitrate (IMDUR) 30 MG extended release tablet Take 1 tablet by mouth daily 90 tablet 5    nitroGLYCERIN (NITROSTAT) 0.4 MG SL tablet Place 1 tablet under the tongue every 5 minutes as needed for Chest pain up to max of 3 total doses. If no relief after 1 dose, call 911. 25 tablet 3     No current facility-administered medications for this visit. I will continue her current medication regimen  which is part of the above treatment schedule. It has been helping with Ms. Meza's chronic  medical problems which for this visit include:   Diagnoses of Chronic pain syndrome, DDD (degenerative disc disease), lumbar, Fibromyalgia, DDD (degenerative disc disease), cervical, and Chronic painful diabetic neuropathy ([facility] [address] were pertinent to this visit. Risks and benefits of the medications and other alternative treatments  including no treatment were discussed with the patient. The common side effects of these medications were also explained to the patient. Informed verbal consent was obtained. Goals of current treatment regimen include improvement in pain, restoration of functioning- with focus on improvement in physical performance, general activity, work or disability,emotional distress, health care utilization and  decreased medication consumption. Will continue to monitor progress towards achieving/maintaining therapeutic goals with special emphasis on  1. Improvement in perceived interfernce  of pain with ADL's. Ability to do home exercises independently. Ability to do household chores indoor and/or outdoor work and social and leisure activities. Improve psychosocial and physical functioning. - she is showing progression towards this treatment goal with the current regimen. She was advised against drinking alcohol with the narcotic pain medicines, advised against driving or handling machinery while adjusting the dose of medicines or if having cognitive  issues related to the current medications. Risk of overdose and death, if medicines not taken as prescribed, were also discussed. If the patient develops new symptoms or if the symptoms worsen, the patient should call the office. While transcribing every attempt was made to maintain the accuracy of the note in terms of it's contents,there may have been some errors made inadvertently. Thank you for allowing me to participate in the care of this patient.     Araceli Sibley MD.    Cc: Esther Linares MD

## 2020-04-30 NOTE — TELEPHONE ENCOUNTER
Mona Inés has a lot of real and psychological problems .  Unfortunately there is no way to tell by just talking to her     If she is unwell then she may need to go to ER

## 2020-05-08 ENCOUNTER — TELEPHONE (OUTPATIENT)
Dept: PRIMARY CARE CLINIC | Age: 65
End: 2020-05-08

## 2020-05-11 ENCOUNTER — HOSPITAL ENCOUNTER (OUTPATIENT)
Age: 65
Setting detail: OBSERVATION
Discharge: HOME | End: 2020-05-12
Attending: INTERNAL MEDICINE | Admitting: INTERNAL MEDICINE
Payer: COMMERCIAL

## 2020-05-11 ENCOUNTER — APPOINTMENT (OUTPATIENT)
Dept: GENERAL RADIOLOGY | Age: 65
End: 2020-05-11
Payer: COMMERCIAL

## 2020-05-11 PROBLEM — R07.9 CHEST PAIN: Status: ACTIVE | Noted: 2020-05-11

## 2020-05-11 LAB
A/G RATIO: 1.1 (ref 1.1–2.2)
ALBUMIN SERPL-MCNC: 4.1 G/DL (ref 3.4–5)
ALP BLD-CCNC: 129 U/L (ref 40–129)
ALT SERPL-CCNC: 17 U/L (ref 10–40)
ANION GAP SERPL CALCULATED.3IONS-SCNC: 14 MMOL/L (ref 3–16)
AST SERPL-CCNC: 25 U/L (ref 15–37)
BASOPHILS ABSOLUTE: 0 K/UL (ref 0–0.2)
BASOPHILS RELATIVE PERCENT: 0.4 %
BILIRUB SERPL-MCNC: 0.3 MG/DL (ref 0–1)
BUN BLDV-MCNC: 27 MG/DL (ref 7–20)
CALCIUM SERPL-MCNC: 9.5 MG/DL (ref 8.3–10.6)
CHLORIDE BLD-SCNC: 106 MMOL/L (ref 99–110)
CO2: 22 MMOL/L (ref 21–32)
CREAT SERPL-MCNC: 1.4 MG/DL (ref 0.6–1.2)
EKG ATRIAL RATE: 75 BPM
EKG DIAGNOSIS: NORMAL
EKG P AXIS: 17 DEGREES
EKG P-R INTERVAL: 118 MS
EKG Q-T INTERVAL: 364 MS
EKG QRS DURATION: 86 MS
EKG QTC CALCULATION (BAZETT): 406 MS
EKG R AXIS: 48 DEGREES
EKG T AXIS: 74 DEGREES
EKG VENTRICULAR RATE: 75 BPM
EOSINOPHILS ABSOLUTE: 0.2 K/UL (ref 0–0.6)
EOSINOPHILS RELATIVE PERCENT: 3.6 %
GFR AFRICAN AMERICAN: 46
GFR NON-AFRICAN AMERICAN: 38
GLOBULIN: 3.6 G/DL
GLUCOSE BLD-MCNC: 110 MG/DL (ref 70–99)
GLUCOSE BLD-MCNC: 183 MG/DL (ref 70–99)
GLUCOSE BLD-MCNC: 202 MG/DL (ref 70–99)
HCT VFR BLD CALC: 29.9 % (ref 36–48)
HEMOGLOBIN: 9.8 G/DL (ref 12–16)
INR BLD: 0.92 (ref 0.86–1.14)
LYMPHOCYTES ABSOLUTE: 1.4 K/UL (ref 1–5.1)
LYMPHOCYTES RELATIVE PERCENT: 28.2 %
MCH RBC QN AUTO: 31.9 PG (ref 26–34)
MCHC RBC AUTO-ENTMCNC: 32.9 G/DL (ref 31–36)
MCV RBC AUTO: 97.2 FL (ref 80–100)
MONOCYTES ABSOLUTE: 0.3 K/UL (ref 0–1.3)
MONOCYTES RELATIVE PERCENT: 6.2 %
NEUTROPHILS ABSOLUTE: 3.2 K/UL (ref 1.7–7.7)
NEUTROPHILS RELATIVE PERCENT: 61.6 %
PDW BLD-RTO: 16 % (ref 12.4–15.4)
PERFORMED ON: ABNORMAL
PERFORMED ON: ABNORMAL
PLATELET # BLD: 232 K/UL (ref 135–450)
PMV BLD AUTO: 7.9 FL (ref 5–10.5)
POTASSIUM SERPL-SCNC: 4.5 MMOL/L (ref 3.5–5.1)
PRO-BNP: 618 PG/ML (ref 0–124)
PROTHROMBIN TIME: 10.7 SEC (ref 10–13.2)
RBC # BLD: 3.08 M/UL (ref 4–5.2)
SODIUM BLD-SCNC: 142 MMOL/L (ref 136–145)
TOTAL PROTEIN: 7.7 G/DL (ref 6.4–8.2)
TROPONIN: <0.01 NG/ML
WBC # BLD: 5.1 K/UL (ref 4–11)

## 2020-05-11 PROCEDURE — 83036 HEMOGLOBIN GLYCOSYLATED A1C: CPT

## 2020-05-11 PROCEDURE — 94760 N-INVAS EAR/PLS OXIMETRY 1: CPT

## 2020-05-11 PROCEDURE — 96372 THER/PROPH/DIAG INJ SC/IM: CPT

## 2020-05-11 PROCEDURE — 93005 ELECTROCARDIOGRAM TRACING: CPT | Performed by: PHYSICIAN ASSISTANT

## 2020-05-11 PROCEDURE — 6360000002 HC RX W HCPCS: Performed by: INTERNAL MEDICINE

## 2020-05-11 PROCEDURE — 96374 THER/PROPH/DIAG INJ IV PUSH: CPT

## 2020-05-11 PROCEDURE — 71045 X-RAY EXAM CHEST 1 VIEW: CPT

## 2020-05-11 PROCEDURE — 36415 COLL VENOUS BLD VENIPUNCTURE: CPT

## 2020-05-11 PROCEDURE — 6370000000 HC RX 637 (ALT 250 FOR IP): Performed by: INTERNAL MEDICINE

## 2020-05-11 PROCEDURE — 6370000000 HC RX 637 (ALT 250 FOR IP): Performed by: PHYSICIAN ASSISTANT

## 2020-05-11 PROCEDURE — 83880 ASSAY OF NATRIURETIC PEPTIDE: CPT

## 2020-05-11 PROCEDURE — 84484 ASSAY OF TROPONIN QUANT: CPT

## 2020-05-11 PROCEDURE — 99285 EMERGENCY DEPT VISIT HI MDM: CPT

## 2020-05-11 PROCEDURE — G0378 HOSPITAL OBSERVATION PER HR: HCPCS

## 2020-05-11 PROCEDURE — 93010 ELECTROCARDIOGRAM REPORT: CPT | Performed by: INTERNAL MEDICINE

## 2020-05-11 PROCEDURE — 85025 COMPLETE CBC W/AUTO DIFF WBC: CPT

## 2020-05-11 PROCEDURE — 6360000002 HC RX W HCPCS: Performed by: PHYSICIAN ASSISTANT

## 2020-05-11 PROCEDURE — 96375 TX/PRO/DX INJ NEW DRUG ADDON: CPT

## 2020-05-11 PROCEDURE — 85610 PROTHROMBIN TIME: CPT

## 2020-05-11 PROCEDURE — 80053 COMPREHEN METABOLIC PANEL: CPT

## 2020-05-11 PROCEDURE — 96376 TX/PRO/DX INJ SAME DRUG ADON: CPT

## 2020-05-11 PROCEDURE — 2580000003 HC RX 258: Performed by: INTERNAL MEDICINE

## 2020-05-11 RX ORDER — GABAPENTIN 300 MG/1
300 CAPSULE ORAL EVERY MORNING
Status: DISCONTINUED | OUTPATIENT
Start: 2020-05-12 | End: 2020-05-12 | Stop reason: HOSPADM

## 2020-05-11 RX ORDER — POTASSIUM CHLORIDE 7.45 MG/ML
10 INJECTION INTRAVENOUS PRN
Status: DISCONTINUED | OUTPATIENT
Start: 2020-05-11 | End: 2020-05-12 | Stop reason: HOSPADM

## 2020-05-11 RX ORDER — HYDRALAZINE HYDROCHLORIDE 50 MG/1
50 TABLET, FILM COATED ORAL 2 TIMES DAILY
Status: DISCONTINUED | OUTPATIENT
Start: 2020-05-11 | End: 2020-05-12 | Stop reason: HOSPADM

## 2020-05-11 RX ORDER — MORPHINE SULFATE 2 MG/ML
2 INJECTION, SOLUTION INTRAMUSCULAR; INTRAVENOUS EVERY 4 HOURS PRN
Status: DISCONTINUED | OUTPATIENT
Start: 2020-05-11 | End: 2020-05-12 | Stop reason: HOSPADM

## 2020-05-11 RX ORDER — TOPIRAMATE 100 MG/1
100 TABLET, FILM COATED ORAL 2 TIMES DAILY
Status: DISCONTINUED | OUTPATIENT
Start: 2020-05-11 | End: 2020-05-12 | Stop reason: HOSPADM

## 2020-05-11 RX ORDER — ASPIRIN 81 MG/1
81 TABLET, CHEWABLE ORAL DAILY
Status: DISCONTINUED | OUTPATIENT
Start: 2020-05-12 | End: 2020-05-12 | Stop reason: HOSPADM

## 2020-05-11 RX ORDER — CLOPIDOGREL BISULFATE 75 MG/1
75 TABLET ORAL DAILY
Status: DISCONTINUED | OUTPATIENT
Start: 2020-05-12 | End: 2020-05-12 | Stop reason: HOSPADM

## 2020-05-11 RX ORDER — MORPHINE SULFATE 4 MG/ML
4 INJECTION, SOLUTION INTRAMUSCULAR; INTRAVENOUS ONCE
Status: COMPLETED | OUTPATIENT
Start: 2020-05-11 | End: 2020-05-11

## 2020-05-11 RX ORDER — PANTOPRAZOLE SODIUM 40 MG/1
40 TABLET, DELAYED RELEASE ORAL
Status: DISCONTINUED | OUTPATIENT
Start: 2020-05-12 | End: 2020-05-12 | Stop reason: HOSPADM

## 2020-05-11 RX ORDER — ONDANSETRON 2 MG/ML
4 INJECTION INTRAMUSCULAR; INTRAVENOUS EVERY 6 HOURS PRN
Status: DISCONTINUED | OUTPATIENT
Start: 2020-05-11 | End: 2020-05-12 | Stop reason: HOSPADM

## 2020-05-11 RX ORDER — OXYCODONE AND ACETAMINOPHEN 7.5; 325 MG/1; MG/1
1 TABLET ORAL EVERY 6 HOURS PRN
Status: DISCONTINUED | OUTPATIENT
Start: 2020-05-11 | End: 2020-05-12 | Stop reason: HOSPADM

## 2020-05-11 RX ORDER — METHOCARBAMOL 500 MG/1
500 TABLET, FILM COATED ORAL 2 TIMES DAILY PRN
Status: DISCONTINUED | OUTPATIENT
Start: 2020-05-11 | End: 2020-05-12 | Stop reason: HOSPADM

## 2020-05-11 RX ORDER — BUDESONIDE AND FORMOTEROL FUMARATE DIHYDRATE 160; 4.5 UG/1; UG/1
2 AEROSOL RESPIRATORY (INHALATION) DAILY
Status: DISCONTINUED | OUTPATIENT
Start: 2020-05-12 | End: 2020-05-12 | Stop reason: HOSPADM

## 2020-05-11 RX ORDER — SODIUM CHLORIDE 0.9 % (FLUSH) 0.9 %
10 SYRINGE (ML) INJECTION PRN
Status: DISCONTINUED | OUTPATIENT
Start: 2020-05-11 | End: 2020-05-12 | Stop reason: HOSPADM

## 2020-05-11 RX ORDER — ACETAMINOPHEN 325 MG/1
650 TABLET ORAL EVERY 6 HOURS PRN
Status: DISCONTINUED | OUTPATIENT
Start: 2020-05-11 | End: 2020-05-12 | Stop reason: HOSPADM

## 2020-05-11 RX ORDER — NICOTINE POLACRILEX 4 MG
15 LOZENGE BUCCAL PRN
Status: DISCONTINUED | OUTPATIENT
Start: 2020-05-11 | End: 2020-05-12 | Stop reason: HOSPADM

## 2020-05-11 RX ORDER — ATORVASTATIN CALCIUM 80 MG/1
80 TABLET, FILM COATED ORAL NIGHTLY
Status: DISCONTINUED | OUTPATIENT
Start: 2020-05-11 | End: 2020-05-12 | Stop reason: HOSPADM

## 2020-05-11 RX ORDER — DEXTROSE MONOHYDRATE 25 G/50ML
12.5 INJECTION, SOLUTION INTRAVENOUS PRN
Status: DISCONTINUED | OUTPATIENT
Start: 2020-05-11 | End: 2020-05-12 | Stop reason: HOSPADM

## 2020-05-11 RX ORDER — SODIUM CHLORIDE 0.9 % (FLUSH) 0.9 %
10 SYRINGE (ML) INJECTION EVERY 12 HOURS SCHEDULED
Status: DISCONTINUED | OUTPATIENT
Start: 2020-05-11 | End: 2020-05-12 | Stop reason: HOSPADM

## 2020-05-11 RX ORDER — MAGNESIUM SULFATE IN WATER 40 MG/ML
2 INJECTION, SOLUTION INTRAVENOUS PRN
Status: DISCONTINUED | OUTPATIENT
Start: 2020-05-11 | End: 2020-05-12 | Stop reason: HOSPADM

## 2020-05-11 RX ORDER — ACETAMINOPHEN 650 MG/1
650 SUPPOSITORY RECTAL EVERY 6 HOURS PRN
Status: DISCONTINUED | OUTPATIENT
Start: 2020-05-11 | End: 2020-05-12 | Stop reason: HOSPADM

## 2020-05-11 RX ORDER — PROMETHAZINE HYDROCHLORIDE 25 MG/1
12.5 TABLET ORAL EVERY 6 HOURS PRN
Status: DISCONTINUED | OUTPATIENT
Start: 2020-05-11 | End: 2020-05-12 | Stop reason: HOSPADM

## 2020-05-11 RX ORDER — POTASSIUM CHLORIDE 20 MEQ/1
40 TABLET, EXTENDED RELEASE ORAL PRN
Status: DISCONTINUED | OUTPATIENT
Start: 2020-05-11 | End: 2020-05-12 | Stop reason: HOSPADM

## 2020-05-11 RX ORDER — GABAPENTIN 300 MG/1
600 CAPSULE ORAL NIGHTLY
Status: DISCONTINUED | OUTPATIENT
Start: 2020-05-11 | End: 2020-05-12 | Stop reason: HOSPADM

## 2020-05-11 RX ORDER — POLYETHYLENE GLYCOL 3350 17 G/17G
17 POWDER, FOR SOLUTION ORAL DAILY PRN
Status: DISCONTINUED | OUTPATIENT
Start: 2020-05-11 | End: 2020-05-12 | Stop reason: HOSPADM

## 2020-05-11 RX ORDER — DEXTROSE MONOHYDRATE 50 MG/ML
100 INJECTION, SOLUTION INTRAVENOUS PRN
Status: DISCONTINUED | OUTPATIENT
Start: 2020-05-11 | End: 2020-05-12 | Stop reason: HOSPADM

## 2020-05-11 RX ORDER — AMLODIPINE BESYLATE 5 MG/1
5 TABLET ORAL DAILY
Status: DISCONTINUED | OUTPATIENT
Start: 2020-05-12 | End: 2020-05-12 | Stop reason: HOSPADM

## 2020-05-11 RX ORDER — NITROGLYCERIN 0.4 MG/1
0.4 TABLET SUBLINGUAL EVERY 5 MIN PRN
Status: DISCONTINUED | OUTPATIENT
Start: 2020-05-11 | End: 2020-05-12 | Stop reason: HOSPADM

## 2020-05-11 RX ORDER — ALBUTEROL SULFATE 2.5 MG/3ML
2.5 SOLUTION RESPIRATORY (INHALATION) EVERY 4 HOURS PRN
Status: DISCONTINUED | OUTPATIENT
Start: 2020-05-11 | End: 2020-05-12 | Stop reason: HOSPADM

## 2020-05-11 RX ORDER — DULOXETIN HYDROCHLORIDE 60 MG/1
60 CAPSULE, DELAYED RELEASE ORAL DAILY
Status: DISCONTINUED | OUTPATIENT
Start: 2020-05-12 | End: 2020-05-12 | Stop reason: HOSPADM

## 2020-05-11 RX ORDER — INSULIN GLARGINE 100 [IU]/ML
40 INJECTION, SOLUTION SUBCUTANEOUS NIGHTLY
Status: DISCONTINUED | OUTPATIENT
Start: 2020-05-11 | End: 2020-05-12 | Stop reason: HOSPADM

## 2020-05-11 RX ORDER — GABAPENTIN 600 MG/1
600 TABLET ORAL NIGHTLY
COMMUNITY
End: 2020-07-22 | Stop reason: SDUPTHER

## 2020-05-11 RX ORDER — ISOSORBIDE MONONITRATE 30 MG/1
30 TABLET, EXTENDED RELEASE ORAL DAILY
Status: DISCONTINUED | OUTPATIENT
Start: 2020-05-12 | End: 2020-05-12 | Stop reason: HOSPADM

## 2020-05-11 RX ORDER — PROCHLORPERAZINE EDISYLATE 5 MG/ML
5 INJECTION INTRAMUSCULAR; INTRAVENOUS ONCE
Status: COMPLETED | OUTPATIENT
Start: 2020-05-11 | End: 2020-05-11

## 2020-05-11 RX ORDER — METOPROLOL SUCCINATE 25 MG/1
25 TABLET, EXTENDED RELEASE ORAL 2 TIMES DAILY WITH MEALS
Status: DISCONTINUED | OUTPATIENT
Start: 2020-05-11 | End: 2020-05-12 | Stop reason: HOSPADM

## 2020-05-11 RX ORDER — RANOLAZINE 500 MG/1
1000 TABLET, EXTENDED RELEASE ORAL 2 TIMES DAILY
Status: DISCONTINUED | OUTPATIENT
Start: 2020-05-11 | End: 2020-05-12 | Stop reason: HOSPADM

## 2020-05-11 RX ORDER — QUETIAPINE FUMARATE 25 MG/1
25 TABLET, FILM COATED ORAL NIGHTLY
Status: DISCONTINUED | OUTPATIENT
Start: 2020-05-11 | End: 2020-05-12 | Stop reason: HOSPADM

## 2020-05-11 RX ADMIN — INSULIN GLARGINE 40 UNITS: 100 INJECTION, SOLUTION SUBCUTANEOUS at 21:16

## 2020-05-11 RX ADMIN — NITROGLYCERIN 1 INCH: 20 OINTMENT TOPICAL at 11:20

## 2020-05-11 RX ADMIN — ASPIRIN 325 MG: 325 TABLET, DELAYED RELEASE ORAL at 11:19

## 2020-05-11 RX ADMIN — HYDRALAZINE HYDROCHLORIDE 50 MG: 50 TABLET, FILM COATED ORAL at 20:23

## 2020-05-11 RX ADMIN — MORPHINE SULFATE 2 MG: 2 INJECTION, SOLUTION INTRAMUSCULAR; INTRAVENOUS at 17:00

## 2020-05-11 RX ADMIN — RANOLAZINE 1000 MG: 500 TABLET, FILM COATED, EXTENDED RELEASE ORAL at 20:23

## 2020-05-11 RX ADMIN — METOPROLOL SUCCINATE 25 MG: 25 TABLET, EXTENDED RELEASE ORAL at 17:05

## 2020-05-11 RX ADMIN — METHOCARBAMOL TABLETS 500 MG: 500 TABLET, COATED ORAL at 21:08

## 2020-05-11 RX ADMIN — ATORVASTATIN CALCIUM 80 MG: 80 TABLET, FILM COATED ORAL at 20:24

## 2020-05-11 RX ADMIN — MORPHINE SULFATE 4 MG: 4 INJECTION, SOLUTION INTRAMUSCULAR; INTRAVENOUS at 11:29

## 2020-05-11 RX ADMIN — OXYCODONE HYDROCHLORIDE AND ACETAMINOPHEN 1 TABLET: 7.5; 325 TABLET ORAL at 15:13

## 2020-05-11 RX ADMIN — GABAPENTIN 600 MG: 300 CAPSULE ORAL at 20:23

## 2020-05-11 RX ADMIN — PROCHLORPERAZINE EDISYLATE 5 MG: 5 INJECTION INTRAMUSCULAR; INTRAVENOUS at 11:34

## 2020-05-11 RX ADMIN — MORPHINE SULFATE 2 MG: 2 INJECTION, SOLUTION INTRAMUSCULAR; INTRAVENOUS at 21:09

## 2020-05-11 RX ADMIN — TOPIRAMATE 100 MG: 100 TABLET, FILM COATED ORAL at 20:23

## 2020-05-11 RX ADMIN — QUETIAPINE FUMARATE 25 MG: 25 TABLET ORAL at 20:23

## 2020-05-11 RX ADMIN — SODIUM CHLORIDE, PRESERVATIVE FREE 10 ML: 5 INJECTION INTRAVENOUS at 20:24

## 2020-05-11 RX ADMIN — ENOXAPARIN SODIUM 40 MG: 40 INJECTION SUBCUTANEOUS at 20:23

## 2020-05-11 RX ADMIN — INSULIN LISPRO 1 UNITS: 100 INJECTION, SOLUTION INTRAVENOUS; SUBCUTANEOUS at 21:16

## 2020-05-11 ASSESSMENT — PAIN DESCRIPTION - PROGRESSION
CLINICAL_PROGRESSION: NOT CHANGED
CLINICAL_PROGRESSION: NOT CHANGED

## 2020-05-11 ASSESSMENT — PAIN DESCRIPTION - PAIN TYPE
TYPE: ACUTE PAIN

## 2020-05-11 ASSESSMENT — PAIN SCALES - GENERAL
PAINLEVEL_OUTOF10: 10
PAINLEVEL_OUTOF10: 4
PAINLEVEL_OUTOF10: 7
PAINLEVEL_OUTOF10: 8
PAINLEVEL_OUTOF10: 10
PAINLEVEL_OUTOF10: 8
PAINLEVEL_OUTOF10: 8

## 2020-05-11 ASSESSMENT — PAIN DESCRIPTION - DESCRIPTORS
DESCRIPTORS: HEAVINESS
DESCRIPTORS: HEAVINESS
DESCRIPTORS: ACHING;DULL;DISCOMFORT;HEAVINESS

## 2020-05-11 ASSESSMENT — PAIN DESCRIPTION - LOCATION
LOCATION: CHEST;ARM
LOCATION: CHEST
LOCATION: CHEST

## 2020-05-11 ASSESSMENT — PAIN DESCRIPTION - DIRECTION
RADIATING_TOWARDS: L ARM
RADIATING_TOWARDS: LEFT ARM

## 2020-05-11 ASSESSMENT — PAIN DESCRIPTION - FREQUENCY
FREQUENCY: CONTINUOUS
FREQUENCY: CONTINUOUS

## 2020-05-11 ASSESSMENT — PAIN DESCRIPTION - ONSET
ONSET: ON-GOING
ONSET: ON-GOING

## 2020-05-11 ASSESSMENT — PAIN DESCRIPTION - ORIENTATION
ORIENTATION: LEFT
ORIENTATION: LEFT

## 2020-05-11 ASSESSMENT — HEART SCORE: ECG: 1

## 2020-05-11 NOTE — ED NOTES
PA-C to bedside and discussing test results and need for admission. Mid chest and left arm pain at 6 but appears very comfortable at this time. Not anxious at all. Sinus rhythm.      Tobias Thao RN  05/11/20 [id number]

## 2020-05-11 NOTE — CARE COORDINATION
INITIAL CASE MANAGEMENT ASSESSMENT    Reviewed chart, spoke with patient to assess possible discharge needs. Explained Case Management role/services. Living Situation: pt lives in [employer], building has elevator. ADLs: independent pta. DME: pt has cane, [employer]. May need walker for home use, had one 2 years ago but lost it during a move,. PT/OT Recs pending      Active Services: [facility] , [employer] and home care aides 5 days per week. Pt states she is also active with [facility] home [facility]. vn pt ot      Transportation: daughter or grandson     Medications: [address]    PCP: Dr Cory Clark      HD/PD: none    PLAN/COMMENTS: pt plans to return to apartment at [facility] and resume home care from [facility]. Pt denies any other dc needs. SW/CM provided contact information for patient or family to call with any questions. SW/CM will follow and assist as needed.   Electronically signed by MARISSA Tamayo on 5/11/2020 at 4:41 PM

## 2020-05-11 NOTE — H&P
Hospital Medicine History & Physical      PCP: Thom Cam MD    Date of Admission: 5/11/2020    Date of Service: Pt seen/examined on  05/11/20 and Placed in Observation. Chief Complaint:    Chief Complaint   Patient presents with    Chest Pain     hx of MI x2, angina, and stents x 6.   reports midsternal chest pain radiating to left upper chest and down left arm to wrist starting about 1030. \"it feels like my heart. \"  took NTG x3 at home with relief of pain from 10 to 8. chest/arm pain now back to 10. \"feels heavy, like something on my chest.\"  mildly anxious and moans at times. no SOB/cough/fever. no COVID19 exposure. History Of Present Illness: The patient is a 62 y.o. female with PMH significant for CAD, anemia, CKD 3, depression, fibromyalgia, DM, HLD, HTN who presents to [facility] with chest pain. Started this morning at 9 am, feels like chest pressure, something sitting on her chest with radiation to arm and back, some SOB and nausea. Took 3 nitro this AM with some but not significant improvement. 10/10 this morning, down to a 6 in the ED with nitro paste and morphine. Not exertional. Not better or worse with eating. Had a similar episode yesterday at home which resolved completely with 3 nitro at home. She has a complicated cardiac history with history of unstable angina, most recent left heart cath December 2018 with patent coronaries. She has seen Dr. Zoran Garrett in the past and recommended medical management. Told that she was not a candidate for CABG. Labs in the ED remarkable for Cr 1.4, stable, , negative trop, Hg 9.8 (baseline).      Past Medical History:        Diagnosis Date    Acid reflux     Anemia     Anxiety     Arthritis     Asthma     Atrial fibrillation (HCC)     Blood transfusion reaction     CAD (coronary artery disease) 12/3/2012  Cerebral artery occlusion with cerebral infarction [facility] [address]     CH[facility]congestive [facility] failure) [facility]     Chronic kidney disease     40% kidney functiom    COPD (chronic obstructive pulmonary disease) ([facility])     Depression     DM2 (diabetes mellitus, type 2) ([facility])     Dysarthria     Fibromyalgia 6/7/2016    Headache(784.0) 2/19/2013    Hemisensory loss     Hyperlipidemia     Hypertension     IBS (irritable bowel syndrome)     Inferior vena cava occlusion ([facility])     Irritable bowel syndrome     Keratitis     MI, old     Neuropathy     Superior vena cava obstruction     Temporal arteritis ([facility] [address] 2/24/2014       Past Surgical History:        Procedure Laterality Date    ABLATION OF DYSRHYTHMIC FOCUS      ARTERY BIOPSY Right 04/23/2014    RIGHT TEMPORAL ARTERY BIOPSY    CATARACT REMOVAL Bilateral     CHOLECYSTECTOMY      CORONARY ANGIOPLASTY WITH STENT PLACEMENT      CORONARY ANGIOPLASTY WITH STENT PLACEMENT      HYSTERECTOMY      JOINT REPLACEMENT Right     KNEE ARTHROSCOPY Right     KNEE SURGERY      right knee replacement    PTCA  10/2019    LAD and RCA inrtervention    TUNNELED VENOUS PORT PLACEMENT      left thigh. SMART PORT    VASCULAR SURGERY         Medications Prior to Admission:    Prior to Admission medications    Medication Sig Start Date End Date Taking? Authorizing Provider   omeprazole (PRILOSEC) 20 MG delayed release capsule TAKE 1 CAPSULE BY MOUTH DAILY 5/11/20  Yes Beth De Oliveira MD   gabapentin (NEURONTIN) 600 MG tablet Take 600 mg by mouth nightly.    Yes Historical Provider, MD   QUEtiapine (SEROQUEL) 25 MG tablet Take 1-2 tablets by mouth nightly 4/29/20  Yes Deidra Wallace MD   DULoxetine (CYMBALTA) 60 MG extended release capsule Take 1 capsule by mouth daily 4/29/20  Yes Deidra Wallace MD   topiramate (TOPAMAX) 100 MG tablet Take 1 tablet by mouth 2 times daily 4/29/20  Yes Deidra Wallace MD   oxyCODONE-acetaminophen (PERCOCET) 7.5-325 MG per tablet Take 1 tablet by mouth every 6 hours as needed for Pain (max 3/day) for up to 28 days. 4/29/20 5/27/20 Yes Deidra Wallace MD   methocarbamol (ROBAXIN) 500 MG tablet Take 1 tablet by mouth 2 times daily  Patient taking differently: Take 500 mg by mouth 2 times daily as needed  4/29/20  Yes Deidra Wallace MD   gabapentin (NEURONTIN) 300 MG capsule take1 tab am, take 2 tabs pm  Patient taking differently: Take 300 mg by mouth every morning. 4/29/20 5/30/20 Yes Deidra Wallace MD   insulin lispro, 1 Unit Dial, (HUMALOG KWIKPEN) 100 UNIT/ML SOPN Inject 2-10 Units into the skin 3 times daily (before meals) Sugars 100-150    4 units    151-200     8 units    > 200     10 Units 4/21/20  Yes Beth De Oliveira MD   diphenoxylate-atropine (DIPHENATOL) 2.5-0.025 MG per tablet Take 1 tablet by mouth 3 times daily as needed for Diarrhea for up to 30 days.  4/14/20 5/14/20 Yes Beth De Oliveira MD   torsemide (DEMADEX) 10 MG tablet TAKE TWO TABLETS BY MOUTH DAILY 4/13/20  Yes Beth De Oliveira MD   Nebulizers (COMPRESSOR/NEBULIZER) MISC 1 Device by Does not apply route 4 times daily as needed (SOB / Wheezing) 3/26/20  Yes Beth De Oliveira MD   albuterol (PROVENTIL) (2.5 MG/3ML) 0.083% nebulizer solution Take 3 mLs by nebulization every 4 hours as needed for Wheezing 3/25/20  Yes Beth De Oliveira MD   hydrALAZINE (APRESOLINE) 50 MG tablet TAKE 1 TABLET BY MOUTH 2 TIMES DAILY 3/18/20  Yes Beth De Oliveira MD   Lancet Devices (SIMPLE DIAGNOSTICS LANCING DEV) MISC USE WITH LANCETS TO TEST BLOOD GLUCOSE 3/10/20  Yes Beth De Oliveira MD   Blood Glucose Monitoring Suppl (TRUE METRIX METER) w/Device KIT USE TO TEST BLOOD GLUCOSE 3/10/20  Yes Beth De Oliveira MD   TRUE METRIX BLOOD GLUCOSE TEST strip USE TO TEST BLOOD GLUCOSE THREE TIMES DAILY 3/10/20  Yes Beth De Oliveira MD   Pharmacist Choice Lancets MISC USE TO TEST BLOOD GLUCOSE THREE TIMES DAILY 3/10/20  Yes Beth De Oliveira MD   UltiCare Alcohol Swabs 70 % PADS USE TO TEST BLOOD GLUCOSE THREE TIMES DAILY 3/10/20  Yes MD GERRI Johnson PENTIPS 31G X 8 MM MISC USE WITH insulin pens 2/19/20  Yes Airam Dupree MD   BASAGLAR KWIKPEN 100 UNIT/ML injection pen INJECT 60 UNITS INTO THE SKIN 2 TIMES DAILY 1/28/20  Yes Airam Dupree MD   budesonide-formoterol (SYMBICORT) 160-4.5 MCG/ACT AERO Inhale 2 puffs into the lungs daily 12/6/19  Yes Airam Dupree MD   albuterol sulfate HFA (VENTOLIN HFA) 108 (90 Base) MCG/ACT inhaler Inhale 2 puffs into the lungs every 4 hours as needed for Wheezing or Shortness of Breath 12/6/19  Yes Airam Dupree MD   ranolazine (RANEXA) 1000 MG extended release tablet Take 1 tablet by mouth 2 times daily 9/10/19  Yes Airam Dupree MD   clopidogrel (PLAVIX) 75 MG tablet TAKE 1 TABLET BY MOUTH DA JULIEN 9/4/19  Yes Airam Dupree MD   metoprolol succinate (TOPROL XL) 50 MG extended release tablet Take 0.5 tablets by mouth 2 times daily (with meals) 9/1/19  Yes Maranda Zarate MD   amLODIPine (NORVASC) 5 MG tablet Take 1 tablet by mouth daily 8/29/19  Yes Airam Dupree MD   atorvastatin (LIPITOR) 80 MG tablet Take 1 tablet by mouth daily  Patient taking differently: Take 80 mg by mouth nightly  8/29/19  Yes Airam Dupree MD   aspirin (ASPIRIN LOW DOSE) 81 MG EC tablet Take 1 tablet by mouth daily 8/29/19  Yes Airam Dupree MD   isosorbide mononitrate (IMDUR) 30 MG extended release tablet Take 1 tablet by mouth daily 8/29/19  Yes Airam Dupree MD   nitroGLYCERIN (NITROSTAT) 0.4 MG SL tablet Place 1 tablet under the tongue every 5 minutes as needed for Chest pain up to max of 3 total doses. If no relief after 1 dose, call 911. 8/29/19  Yes Airam Dupree MD       Allergies:  Patient has no known allergies. Social History:  The patient currently lives alone    TOBACCO:   reports that she quit smoking about 22 months ago. Her smoking use included cigarettes. She has a 17.50 pack-year smoking history.  She has quit using smokeless tobacco.  ETOH:   reports no history of alcohol use. Family History:  Reviewed in detail and negative for DM, Early CAD, Cancer, CVA. Positive as follows:        Problem Relation Age of Onset    Diabetes Mother     Hypertension Mother     High Cholesterol Mother     Stroke Mother     Cancer Mother     No Known Problems Paternal Grandfather         lung issues        REVIEW OF SYSTEMS:   Positive as noted in the HPI. All other systems reviewed and negative. PHYSICAL EXAM:    BP (!) 145/73   Pulse 69   Temp 98.3 °F (36.8 °C) (Oral)   Resp 18   Ht 5' (1.524 m)   Wt 133 lb 6.1 oz (60.5 kg)   SpO2 98%   BMI 26.05 kg/m²     General appearance: No apparent distress appears stated age and cooperative. HEENT Normal cephalic, atraumatic without obvious deformity. Pupils equal, round, and reactive to light. Extra ocular muscles intact. Conjunctivae/corneas clear. Neck: Supple, trachea midline without thyromegaly or adenopathy with full range of motion. Lungs: Clear to auscultation, bilaterally without Rales/Wheezes/Rhonchi with good respiratory effort. Heart: Regular rate and rhythm with Normal S1/S2 without murmurs, rubs or gallops, point of maximum impulse non-displaced  Abdomen: Soft, non-tender or non-distended without rigidity or guarding and positive bowel sounds all four quadrants. Extremities: No clubbing, cyanosis, or edema bilaterally. Full range of motion without deformity and normal gait intact. Skin: Skin color, texture, turgor normal.  No rashes or lesions. Neurologic: Alert and oriented X 3, neurovascularly intact with sensory/motor intact upper extremities/lower extremities, bilaterally. Cranial nerves: II-XII intact, grossly non-focal.  Mental status: Alert, oriented, thought content appropriate.   Capillary Refill: Acceptable  < 3 seconds  Peripheral Pulses: +3 Easily felt, not easily obliterated with pressure      CXR:  I have reviewed the CXR with the following depression  Continue home meds. DVT Prophylaxis: Lovenox  Diet: Diet NPO, After Midnight  DIET CARDIAC; No Caffeine  Code Status: Full Code  PT/OT Eval Status: ordered    Charlene Hooker - pending       Eric Suggs MD    Thank you eKlly Sotelo MD for the opportunity to be involved in this patient's care. If you have any questions or concerns please feel free to contact me at [phone]. This note was transcribed using [id number] [employer]. Please disregard any translational errors.

## 2020-05-11 NOTE — ED NOTES
Pt now given procedural mask. Staff wearing procedural mask and eye protection (helmet or goggles) for staff protection-COVID 19      hx of MI x2, angina, and stents x 6.   reports midsternal chest pain radiating to left upper chest and down left arm to wrist starting about 1030. \"it feels like my heart. \"  took NTG x3 at home with relief of pain from 10 to 8. chest/arm pain now back to 10. \"feels heavy, like something on my chest.\"  mildly anxious and moans at times. no SOB/cough/fever. no COVID19 exposure.         Juancarlos Gerardo RN  05/11/20 1060

## 2020-05-11 NOTE — ED PROVIDER NOTES
[address] ENCOUNTER        Pt Name: Georgina Meigs  MRN: [id number]  [place] 1955  Date of evaluation: 5/11/2020  Provider: Ian Cardona PA-C  PCP: Luci Becker MD    Evaluation by STEPHEN. My supervising physician was available for consultation. Saul Jackson MD      CHIEF COMPLAINT       Chief Complaint   Patient presents with    Chest Pain     hx of MI x2, angina, and stents x 6.   reports midsternal chest pain radiating to left upper chest and down left arm to wrist starting about 1030. \"it feels like my heart. \"  took NTG x3 at home with relief of pain from 10 to 8. chest/arm pain now back to 10. \"feels heavy, like something on my chest.\"  mildly anxious and moans at times. no SOB/cough/fever. no COVID19 exposure. HISTORY OF PRESENT ILLNESS   (Location, Timing/Onset, Context/Setting, Quality, Duration, Modifying Factors, Severity, Associated Signs and Symptoms)  Note limiting factors. Georgina Meigs is a 62 y.o. female patient presenting for evaluation of chest pain. She was brought into the facility by her grandson. She states onset severe chest pressure beginning at approximately 10 AM.  Between 10 AM and 10:30 PM she self-administered nitro x3. Pain minimally decreased to an 8. It is now 11:15 AM and the pain returned to a 10/10. Pain is left chest oriented with pressure sensation and referred pain up to the neck, shoulder, left arm and left wrist.  She also feels short of breath and has nausea. No evidence diaphoresis. Patient does report having had similar event yesterday morning about the same time at which time she did self administer sublingual nitro x3 and the pain resolved and remained asymptomatic throughout the remainder the day. She did contact nurse who advised schedule appointment in future. Patient has known history of coronary disease with previous stent placement and without CABG.   The GABAPENTIN (NEURONTIN) 600 MG TABLET    Take 600 mg by mouth nightly. HYDRALAZINE (APRESOLINE) 50 MG TABLET    TAKE 1 TABLET BY MOUTH 2 TIMES DAILY    INSULIN LISPRO, 1 UNIT DIAL, (HUMALOG KWIKPEN) 100 UNIT/ML SOPN    Inject 2-10 Units into the skin 3 times daily (before meals) Sugars 100-150    4 units    151-200     8 units    > 200     10 Units    ISOSORBIDE MONONITRATE (IMDUR) 30 MG EXTENDED RELEASE TABLET    Take 1 tablet by mouth daily    LANCET DEVICES (SIMPLE DIAGNOSTICS LANCING DEV) MISC    USE WITH LANCETS TO TEST BLOOD GLUCOSE    METHOCARBAMOL (ROBAXIN) 500 MG TABLET    Take 1 tablet by mouth 2 times daily    METOPROLOL SUCCINATE (TOPROL XL) 50 MG EXTENDED RELEASE TABLET    Take 0.5 tablets by mouth 2 times daily (with meals)    NEBULIZERS (COMPRESSOR/NEBULIZER) MISC    1 Device by Does not apply route 4 times daily as needed (SOB / Wheezing)    NITROGLYCERIN (NITROSTAT) 0.4 MG SL TABLET    Place 1 tablet under the tongue every 5 minutes as needed for Chest pain up to max of 3 total doses. If no relief after 1 dose, call 911. OXYCODONE-ACETAMINOPHEN (PERCOCET) 7.5-325 MG PER TABLET    Take 1 tablet by mouth every 6 hours as needed for Pain (max 3/day) for up to 28 days. PHARMACIST CHOICE LANCETS MISC    USE TO TEST BLOOD GLUCOSE THREE TIMES DAILY    QUETIAPINE (SEROQUEL) 25 MG TABLET    Take 1-2 tablets by mouth nightly    RANOLAZINE (RANEXA) 1000 MG EXTENDED RELEASE TABLET    Take 1 tablet by mouth 2 times daily    TOPIRAMATE (TOPAMAX) 100 MG TABLET    Take 1 tablet by mouth 2 times daily    TORSEMIDE (DEMADEX) 10 MG TABLET    TAKE TWO TABLETS BY MOUTH DAILY    TRUE METRIX BLOOD GLUCOSE TEST STRIP    USE TO TEST BLOOD GLUCOSE THREE TIMES DAILY    ULTICARE ALCOHOL SWABS 70 % PADS    USE TO TEST BLOOD GLUCOSE THREE TIMES DAILY    UNIFINE PENTIPS 31G X 8 MM MISC    USE WITH insulin pens         ALLERGIES     Patient has no known allergies.     FAMILYHISTORY       Family History   Problem Relation findings:    Chest x-ray shows no acute cardiopulmonary pathology. Interpretation per the Radiologist below, if available at the time of this note:    XR CHEST PORTABLE   Final Result   No acute cardiopulmonary pathology. No results found. PROCEDURES   Unless otherwise noted below, none     Procedures    CRITICAL CARE TIME   N/A    CONSULTS:  IP CONSULT TO HOSPITALIST  IP CONSULT TO CARDIOLOGY      EMERGENCY DEPARTMENT COURSE and DIFFERENTIAL DIAGNOSIS/MDM:   Vitals:    Vitals:    05/11/20 1300 05/11/20 1330 05/11/20 1400 05/11/20 1410   BP: (!) 153/67 (!) 152/62 (!) 146/52    Pulse: 60 70 67 64   Resp: 15 14 16 15   Temp:    98.2 °F (36.8 °C)   TempSrc:    Oral   SpO2: 98% 100%  100%   Weight:       Height:           Patient was given the following medications:  Medications   aspirin EC tablet 325 mg (325 mg Oral Given 5/11/20 1119)   nitroglycerin (NITRO-BID) 2 % ointment 1 inch (1 inch Topical Given 5/11/20 1120)   morphine (PF) injection 4 mg (4 mg Intravenous Given 5/11/20 1129)   prochlorperazine (COMPAZINE) injection 5 mg (5 mg Intravenous Given 5/11/20 1134)       This patient presenting with severe left chest pressure beginning 10 AM this morning. She did self administer sublingual nitro x3 with slight decrease in pain to an 8-9/10. It was shortly thereafter the pain again increased to a 10/10. She had a grandson drive her to this facility for evaluation. Patient with known coronary disease and multiple stent placements previously. Patient report having had similar event yesterday morning at which time she did self administer sublingual nitro x2 and the pain resolved and she remained asymptomatic. Patient initial assessment is at approximately 9:15 AM.  Patient was given aspirin 325 mg, Nitro-Bid 1 inch and morphine sulfate 4 mg IV for pain control. Gradually her pain began to decrease to a 6-7/10. Patient had some transient nausea was given Compazine 5 mg IV.   Patient resting comfortably at this time. The patient requires admission for further evaluation of acute chest pain with known CAD. Patient requesting [facility] and is requesting transfer of her current care from Dr. Delano Barnett to Dr. Pura Dalton. He is discussed with attending physician who recommends admission of this patient for further evaluation. The patient's heart score is 6. Laboratory studies: CBC shows stable anemia with hemoglobin 9.8. The patient sodium 142, potassium 4.5. Patient's BUN 27 creatinine 1.4 with a GFR of 46 which is better than previous baseline. She has diabetic with a blood sugar of 202. Patient's proBNP is age-appropriate at 618. Troponin is <0.01. EKG: EKG is obtained by nursing staff. This is reviewed by attending physician with comparison to previous and without acute change. At 12:30 PM I spoke with Dr. Alona Arredondo hospitalist at [facility].  Case reviewed discussed. Patient is accepted and will be admitted for further evaluation. FINAL IMPRESSION      1. Acute chest pain    2. History of coronary artery disease    3. History of coronary angioplasty with insertion of stent          DISPOSITION/PLAN   DISPOSITION Admitted 05/11/2020 12:38:54 PM      PATIENT REFERREDTO:  Lei Hill MD  [address] [address]  [phone]            DISCHARGE MEDICATIONS:  New Prescriptions    No medications on file       DISCONTINUED MEDICATIONS:  Discontinued Medications    ALBUTEROL SULFATE HFA (VENTOLIN HFA) 108 (90 BASE) MCG/ACT INHALER    Inhale 2 puffs into the lungs every 4 hours as needed for Wheezing    LIDOCAINE (LIDODERM) 5 %    Place 1 patch onto the skin daily 12 hours on, 12 hours off. (Please note that portions of this note were completed with a voice recognition program.  Efforts were made to edit the dictations but occasionally words are mis-transcribed. )    Sissy Torres PA-C (electronically signed)           Sissy Torres PA-C  05/11/20

## 2020-05-11 NOTE — ED NOTES
Access center called to say pt will be transported to [address] [place] [address],[address] room 4115 at 1400 [facility]. Pt updated. Chest pain down to 4. Watching tv.        Gale Olson RN  05/11/20 1626

## 2020-05-12 VITALS
HEIGHT: 60 IN | SYSTOLIC BLOOD PRESSURE: 144 MMHG | DIASTOLIC BLOOD PRESSURE: 65 MMHG | OXYGEN SATURATION: 98 % | WEIGHT: 129.19 LBS | TEMPERATURE: 97.3 F | BODY MASS INDEX: 25.36 KG/M2 | HEART RATE: 70 BPM | RESPIRATION RATE: 16 BRPM

## 2020-05-12 LAB
ALBUMIN SERPL-MCNC: 3.4 G/DL (ref 3.4–5)
ANION GAP SERPL CALCULATED.3IONS-SCNC: 10 MMOL/L (ref 3–16)
BUN BLDV-MCNC: 22 MG/DL (ref 7–20)
CALCIUM SERPL-MCNC: 9.1 MG/DL (ref 8.3–10.6)
CHLORIDE BLD-SCNC: 106 MMOL/L (ref 99–110)
CO2: 21 MMOL/L (ref 21–32)
CREAT SERPL-MCNC: 1.2 MG/DL (ref 0.6–1.2)
ESTIMATED AVERAGE GLUCOSE: 220.2 MG/DL
ESTIMATED AVERAGE GLUCOSE: 231.7 MG/DL
GFR AFRICAN AMERICAN: 55
GFR NON-AFRICAN AMERICAN: 45
GLUCOSE BLD-MCNC: 140 MG/DL (ref 70–99)
GLUCOSE BLD-MCNC: 153 MG/DL (ref 70–99)
GLUCOSE BLD-MCNC: 58 MG/DL (ref 70–99)
GLUCOSE BLD-MCNC: 58 MG/DL (ref 70–99)
GLUCOSE BLD-MCNC: 62 MG/DL (ref 70–99)
GLUCOSE BLD-MCNC: 82 MG/DL (ref 70–99)
HBA1C MFR BLD: 9.3 %
HBA1C MFR BLD: 9.7 %
HCT VFR BLD CALC: 30 % (ref 36–48)
HEMOGLOBIN: 9.6 G/DL (ref 12–16)
MAGNESIUM: 2.1 MG/DL (ref 1.8–2.4)
MCH RBC QN AUTO: 31.7 PG (ref 26–34)
MCHC RBC AUTO-ENTMCNC: 32.1 G/DL (ref 31–36)
MCV RBC AUTO: 98.7 FL (ref 80–100)
PDW BLD-RTO: 15.9 % (ref 12.4–15.4)
PERFORMED ON: ABNORMAL
PERFORMED ON: NORMAL
PHOSPHORUS: 4.3 MG/DL (ref 2.5–4.9)
PLATELET # BLD: 200 K/UL (ref 135–450)
PMV BLD AUTO: 8.4 FL (ref 5–10.5)
POTASSIUM SERPL-SCNC: 4 MMOL/L (ref 3.5–5.1)
RBC # BLD: 3.04 M/UL (ref 4–5.2)
SODIUM BLD-SCNC: 137 MMOL/L (ref 136–145)
WBC # BLD: 3.8 K/UL (ref 4–11)

## 2020-05-12 PROCEDURE — 85027 COMPLETE CBC AUTOMATED: CPT

## 2020-05-12 PROCEDURE — 6360000002 HC RX W HCPCS: Performed by: INTERNAL MEDICINE

## 2020-05-12 PROCEDURE — 6370000000 HC RX 637 (ALT 250 FOR IP): Performed by: INTERNAL MEDICINE

## 2020-05-12 PROCEDURE — 97162 PT EVAL MOD COMPLEX 30 MIN: CPT

## 2020-05-12 PROCEDURE — 94640 AIRWAY INHALATION TREATMENT: CPT

## 2020-05-12 PROCEDURE — 97116 GAIT TRAINING THERAPY: CPT

## 2020-05-12 PROCEDURE — 83735 ASSAY OF MAGNESIUM: CPT

## 2020-05-12 PROCEDURE — G0378 HOSPITAL OBSERVATION PER HR: HCPCS

## 2020-05-12 PROCEDURE — 97535 SELF CARE MNGMENT TRAINING: CPT

## 2020-05-12 PROCEDURE — 36415 COLL VENOUS BLD VENIPUNCTURE: CPT

## 2020-05-12 PROCEDURE — 96376 TX/PRO/DX INJ SAME DRUG ADON: CPT

## 2020-05-12 PROCEDURE — 2580000003 HC RX 258: Performed by: INTERNAL MEDICINE

## 2020-05-12 PROCEDURE — 97166 OT EVAL MOD COMPLEX 45 MIN: CPT

## 2020-05-12 PROCEDURE — 99215 OFFICE O/P EST HI 40 MIN: CPT | Performed by: INTERNAL MEDICINE

## 2020-05-12 PROCEDURE — 80069 RENAL FUNCTION PANEL: CPT

## 2020-05-12 PROCEDURE — 94760 N-INVAS EAR/PLS OXIMETRY 1: CPT

## 2020-05-12 PROCEDURE — 96375 TX/PRO/DX INJ NEW DRUG ADDON: CPT

## 2020-05-12 RX ADMIN — CLOPIDOGREL BISULFATE 75 MG: 75 TABLET ORAL at 09:27

## 2020-05-12 RX ADMIN — ASPIRIN 81 MG 81 MG: 81 TABLET ORAL at 09:27

## 2020-05-12 RX ADMIN — METOPROLOL SUCCINATE 25 MG: 25 TABLET, EXTENDED RELEASE ORAL at 09:29

## 2020-05-12 RX ADMIN — DULOXETINE HYDROCHLORIDE 60 MG: 60 CAPSULE, DELAYED RELEASE ORAL at 09:28

## 2020-05-12 RX ADMIN — AMLODIPINE BESYLATE 5 MG: 5 TABLET ORAL at 09:27

## 2020-05-12 RX ADMIN — TOPIRAMATE 100 MG: 100 TABLET, FILM COATED ORAL at 09:27

## 2020-05-12 RX ADMIN — SODIUM CHLORIDE, PRESERVATIVE FREE 10 ML: 5 INJECTION INTRAVENOUS at 07:49

## 2020-05-12 RX ADMIN — MORPHINE SULFATE 2 MG: 2 INJECTION, SOLUTION INTRAMUSCULAR; INTRAVENOUS at 07:43

## 2020-05-12 RX ADMIN — GABAPENTIN 300 MG: 300 CAPSULE ORAL at 09:27

## 2020-05-12 RX ADMIN — MORPHINE SULFATE 2 MG: 2 INJECTION, SOLUTION INTRAMUSCULAR; INTRAVENOUS at 11:45

## 2020-05-12 RX ADMIN — ISOSORBIDE MONONITRATE 30 MG: 30 TABLET, EXTENDED RELEASE ORAL at 09:27

## 2020-05-12 RX ADMIN — RANOLAZINE 1000 MG: 500 TABLET, FILM COATED, EXTENDED RELEASE ORAL at 09:27

## 2020-05-12 RX ADMIN — OXYCODONE HYDROCHLORIDE AND ACETAMINOPHEN 1 TABLET: 7.5; 325 TABLET ORAL at 06:57

## 2020-05-12 RX ADMIN — HYDRALAZINE HYDROCHLORIDE 50 MG: 50 TABLET, FILM COATED ORAL at 09:27

## 2020-05-12 RX ADMIN — PANTOPRAZOLE SODIUM 40 MG: 40 TABLET, DELAYED RELEASE ORAL at 06:22

## 2020-05-12 RX ADMIN — DEXTROSE MONOHYDRATE 12.5 G: 500 INJECTION PARENTERAL at 07:45

## 2020-05-12 RX ADMIN — OXYCODONE HYDROCHLORIDE AND ACETAMINOPHEN 1 TABLET: 7.5; 325 TABLET ORAL at 15:46

## 2020-05-12 RX ADMIN — BUDESONIDE AND FORMOTEROL FUMARATE DIHYDRATE 2 PUFF: 160; 4.5 AEROSOL RESPIRATORY (INHALATION) at 08:21

## 2020-05-12 RX ADMIN — METOPROLOL SUCCINATE 25 MG: 25 TABLET, EXTENDED RELEASE ORAL at 17:57

## 2020-05-12 ASSESSMENT — PAIN SCALES - GENERAL
PAINLEVEL_OUTOF10: 7
PAINLEVEL_OUTOF10: 10
PAINLEVEL_OUTOF10: 8
PAINLEVEL_OUTOF10: 4
PAINLEVEL_OUTOF10: 4
PAINLEVEL_OUTOF10: 9
PAINLEVEL_OUTOF10: 4
PAINLEVEL_OUTOF10: 4

## 2020-05-12 ASSESSMENT — PAIN DESCRIPTION - PROGRESSION
CLINICAL_PROGRESSION: NOT CHANGED

## 2020-05-12 ASSESSMENT — PAIN DESCRIPTION - ORIENTATION
ORIENTATION: LEFT

## 2020-05-12 ASSESSMENT — PAIN DESCRIPTION - ONSET
ONSET: ON-GOING

## 2020-05-12 ASSESSMENT — PAIN DESCRIPTION - DESCRIPTORS
DESCRIPTORS: ACHING
DESCRIPTORS: ACHING
DESCRIPTORS: PRESSURE

## 2020-05-12 ASSESSMENT — PAIN DESCRIPTION - PAIN TYPE
TYPE: ACUTE PAIN

## 2020-05-12 ASSESSMENT — PAIN DESCRIPTION - FREQUENCY
FREQUENCY: CONTINUOUS

## 2020-05-12 ASSESSMENT — PAIN - FUNCTIONAL ASSESSMENT
PAIN_FUNCTIONAL_ASSESSMENT: ACTIVITIES ARE NOT PREVENTED

## 2020-05-12 ASSESSMENT — PAIN DESCRIPTION - LOCATION
LOCATION: CHEST

## 2020-05-12 ASSESSMENT — PAIN DESCRIPTION - DIRECTION: RADIATING_TOWARDS: L ARM

## 2020-05-12 NOTE — CONSULTS
Recent Labs     05/11/20  1120   AST 25   ALT 17   BILITOT 0.3   ALKPHOS 129     No results for input(s): LIPASE, AMYLASE in the last 72 hours. Recent Labs     05/11/20  1120   PROTIME 10.7   INR 0.92       Imaging  XR CHEST PORTABLE   Final Result   No acute cardiopulmonary pathology. ASSESSMENT AND RECOMMENDATIONS   62 y.o. female with a PMH of  CAD, coronary stents, atrial fibrillation, DM, temporal arteritis, IBS, fibromyalgia, COPD, CKD, HTN, HLD, arthritis, anxiety, depression, cholecystectomy and hysterectomy who presented on 5/11/2020 with chest pain. She was evaluated by Cardiology. Trop neg, EKG unchanged, CXR with no acute findings. She has hx of [facility] in Jan with stenting. Not a CABG candidate. Cardiology felt pain to be atypical, noncardiac. We are consulted to evaluate for GI origin. IMPRESSION:    1. Atypical chest pain: Prior EGDs 4/2019, 3/2017 unremarkable. Patient denies heartburn, dysphagia, odynophagia, unexplained weight loss, NSAID use. She is on Protonix daily  2. CAD s/p LHC with stenting: On Plavix and aspirin. 3. Chronic pain syndrome: Follows with a pain specialist, on Percocet at home    RECOMMENDATIONS:    We will discuss case with Dr. Katelyn Rudolph. Please await his input and recommendations regarding if additional work-up is indicated. Continue daily PPI    If you have any questions or need any further information, please feel free to contact our consult team.  Thank you for allowing us to participate in the care of Gonzalez Pascal. The note was completed using Dragon voice recognition transcription. Every effort was made to ensure accuracy; however, inadvertent transcription errors may be present despite my best efforts to edit errors.       Betsey Rosas PA-C

## 2020-05-12 NOTE — DISCHARGE SUMMARY
with multiple stents, was deemed not to be a candidate for CABG and has had multiple overlapping stents placed in the past.  Most recent cath was in January without any acute intervention. Troponins remain negative and EKG was unchanged. She was seen by cardiology who did not think that she needed any further work-up and recommended continued medical management. Recommended that she be seen by GI who also saw her and did not think that she had any GI cause of her pain. Recommended continuing PPI. Diabetes  Fairly well controlled. Basal bolus insulin. Chronic diastolic heart failure  Not in acute exacerbation. Continue home meds     A. Fib, palpitations  Post ablation x2 at [facility].  Most recent note from Dr. Bailee Marrufo says 30-day event monitor with no prolonged arrhythmias or A. Fib. No acute events on telemetry     COPD, reactive airway disease  Negative PFTs in 2011 but has not repeated since then. Former smoker. Saw Dr. Nicholas Bledsoe last in August 2019. Continued on Symbicort and as needed albuterol.     HTN  Stable, continue home meds.      Hyperlipidemia  Continue statin.      Fibromyalgia, depression  Continue home meds. Discharge Medications:   Current Discharge Medication List        Current Discharge Medication List        Current Discharge Medication List      CONTINUE these medications which have NOT CHANGED    Details   omeprazole (PRILOSEC) 20 MG delayed release capsule TAKE 1 CAPSULE BY MOUTH DAILY  Qty: 30 capsule, Refills: 1    Associated Diagnoses: Essential hypertension      gabapentin (NEURONTIN) 600 MG tablet Take 600 mg by mouth nightly.       QUEtiapine (SEROQUEL) 25 MG tablet Take 1-2 tablets by mouth nightly  Qty: 60 tablet, Refills: 0    Associated Diagnoses: Chronic pain syndrome      DULoxetine (CYMBALTA) 60 MG extended release capsule Take 1 capsule by mouth daily  Qty: 30 capsule, Refills: 0    Associated Diagnoses: Chronic pain syndrome; Fibromyalgia      topiramate (TOPAMAX) 100 MG tablet Take 1 tablet by mouth 2 times daily  Qty: 60 tablet, Refills: 0    Associated Diagnoses: Chronic pain syndrome      oxyCODONE-acetaminophen (PERCOCET) 7.5-325 MG per tablet Take 1 tablet by mouth every 6 hours as needed for Pain (max 3/day) for up to 28 days. Qty: 84 tablet, Refills: 0    Comments: Reduce doses taken as pain becomes manageable  Associated Diagnoses: Chronic pain syndrome; Chronic painful diabetic neuropathy (HCC); DDD (degenerative disc disease), cervical; DDD (degenerative disc disease), lumbar; Fibromyalgia; Rotator cuff tendonitis, right      methocarbamol (ROBAXIN) 500 MG tablet Take 1 tablet by mouth 2 times daily  Qty: 60 tablet, Refills: 0    Associated Diagnoses: Chronic pain syndrome; DDD (degenerative disc disease), lumbar; Fibromyalgia; DDD (degenerative disc disease), cervical; Rotator cuff tendonitis, right; Chronic painful diabetic neuropathy (HCC)      gabapentin (NEURONTIN) 300 MG capsule take1 tab am, take 2 tabs pm  Qty: 90 capsule, Refills: 0    Associated Diagnoses: Chronic pain syndrome; DDD (degenerative disc disease), lumbar; Fibromyalgia; DDD (degenerative disc disease), cervical; Rotator cuff tendonitis, right; Chronic painful diabetic neuropathy (HCC)      insulin lispro, 1 Unit Dial, (HUMALOG KWIKPEN) 100 UNIT/ML SOPN Inject 2-10 Units into the skin 3 times daily (before meals) Sugars 100-150    4 units    151-200     8 units    > 200     10 Units  Qty: 6 pen, Refills: 5    Associated Diagnoses: Uncontrolled type 2 diabetes mellitus with complication, with long-term current use of insulin ([facility])      diphenoxylate-atropine (DIPHENATOL) 2.5-0.025 MG per tablet Take 1 tablet by mouth 3 times daily as needed for Diarrhea for up to 30 days.   Qty: 30 tablet, Refills: 1    Associated Diagnoses: Diarrhea, unspecified type      torsemide (DEMADEX) 10 MG tablet TAKE TWO TABLETS BY MOUTH DAILY  Qty: 60 tablet, Refills: 5    Associated Diagnoses: Essential tablet under the tongue every 5 minutes as needed for Chest pain up to max of 3 total doses. If no relief after 1 dose, call 911. Qty: 25 tablet, Refills: 3    Associated Diagnoses: Coronary artery disease involving native coronary artery of native heart without angina pectoris           Current Discharge Medication List          Procedures: None     Assessment on Discharge: Stable, improved     Discharge Exam:  BP (!) 144/65   Pulse 70   Temp 97.3 °F (36.3 °C) (Oral)   Resp 16   Ht 5' (1.524 m)   Wt 129 lb 3 oz (58.6 kg)   SpO2 98%   BMI 25.23 kg/m²     General appearance: No apparent distress, appears stated age and cooperative. HEENT: Pupils equal, round, and reactive to light. Conjunctivae/corneas clear. Neck: Supple, with full range of motion. Trachea midline. Respiratory:  Normal respiratory effort. Clear to auscultation, bilaterally without Rales/Wheezes/Rhonchi. Cardiovascular: Regular rate and rhythm with normal S1/S2 without murmurs, rubs or gallops. Abdomen: Soft, non-tender, non-distended with normal bowel sounds. Musculoskelatal: No clubbing, cyanosis or edema bilaterally. Full range of motion without deformity. Skin: Skin color, texture, turgor normal.  No rashes or lesions. Neurologic:  Neurovascularly intact without any focal sensory/motor deficits. Cranial nerves: II-XII intact, grossly non-focal.  Psychiatric: Alert and oriented, thought content appropriate, normal insight    Pertinent Studies During Hospital Stay:    Radiology:  Xr Chest Portable    Result Date: 5/11/2020  EXAMINATION: ONE XRAY VIEW OF THE CHEST 5/11/2020 11:06 am COMPARISON: 04/06/2020 HISTORY: ORDERING SYSTEM PROVIDED HISTORY: Chest pain TECHNOLOGIST PROVIDED HISTORY: Reason for exam:->Chest pain Reason for Exam: active mid chest pain down left arm Acuity: Acute Type of Exam: Initial FINDINGS: Single portable frontal view of the chest is submitted for review. The cardiac silhouette is normal in size.   Lung parenchyma is clear without focal airspace consolidation, sizeable pleural effusion, or pneumothorax. Trachea is midline. Visualized osseous structures and soft tissues are grossly intact. No acute cardiopulmonary pathology. Xr Chest Portable    Result Date: 4/18/2020  Site: [place] President #: [id number] #: [id number]: GSEDAccount #: [de-identified] #: [id number] #: [id number]: XR CHEST AP PORTABLEExam Date/Time: 04/18/2020 06:00 PMAdmitting Diagnosis: Chest painReason for Exam: Chest pain Dictated by: Blas Charles EDWARDO: 04/18/2020 06:10 PMT: This document is confidential medical information. Unauthorized disclosure or use of this information is prohibited by law. If you are not the intended recipient of this document, please advise us  by calling immediately [phone]. Impression/Conclusion below HISTORY:   Chest pain COMPARISON: 4/6/2020 NOTE:  If there are questions about the content of this report, please contact [facility] [place] by calling [phone] FINDINGS: LINES/DEVICES: None LUNGS/PLEURA:  Unremarkable HEART:  Unremarkable MEDIASTINUM/RADHA:  Unremarkable BONES:  Unremarkable OTHER:  None  IMPRESSION: No significant abnormality SIGNED BY: Estela Rdz MD on 4/18/2020  6:07 PM   [address] [facility] [phone] - [address]: [phone]         Last Labs on Discharge:     Recent Results (from the past 24 hour(s))   Troponin    Collection Time: 05/11/20  8:57 PM   Result Value Ref Range    Troponin <0.01 <0.01 ng/mL   POCT Glucose    Collection Time: 05/11/20  9:10 PM   Result Value Ref Range    POC Glucose 183 (H) 70 - 99 mg/dl    Performed on ACCU-CHEK    CBC    Collection Time: 05/12/20  6:43 AM   Result Value Ref Range    WBC 3.8 (L) 4.0 - 11.0 K/uL    RBC 3.04 (L) 4.00 - 5.20 M/uL    Hemoglobin 9.6 (L) 12.0 - 16.0 g/dL    Hematocrit 30.0 (L) 36.0 - 48.0 %    MCV 98.7 80.0 - 100.0 fL    MCH 31.7 26.0 - 34.0 pg

## 2020-05-12 NOTE — CONSULTS
Take 1 tablet by mouth 3 times daily as needed for Diarrhea for up to 30 days.  4/14/20 5/14/20 Yes [facility] Greg, MD   torsemide (DEMADEX) 10 MG tablet TAKE TWO TABLETS BY MOUTH DAILY 4/13/20  [facility] Greg, MD   Nebulizers (COMPRESSOR/NEBULIZER) MISC 1 Device by Does not apply route 4 times daily as needed (SOB / Wheezing) 3/26/20  Yes Catana Areas, MD   albuterol (PROVENTIL) (2.5 MG/3ML) 0.083% nebulizer solution Take 3 mLs by nebulization every 4 hours as needed for Wheezing 3/25/20  [facility] Greg, MD   hydrALAZINE (APRESOLINE) 50 MG tablet TAKE 1 TABLET BY MOUTH 2 TIMES DAILY 3/18/20  Yes [facility] Greg, MD   Lancet Devices ([employer] LANCING DEV) MISC USE WITH LANCETS TO TEST BLOOD GLUCOSE 3/10/20  [facility], MD   Blood Glucose Monitoring Suppl (TRUE METRIX METER) w/Device KIT USE TO TEST BLOOD GLUCOSE 3/10/20  [facility], MD   TRUE METRIX BLOOD GLUCOSE TEST strip USE TO TEST BLOOD GLUCOSE THREE TIMES DAILY 3/10/20  [facility], MD   Pharmacist Choice Lancets MISC USE TO TEST BLOOD GLUCOSE THREE TIMES DAILY 3/10/20  [facility], MD   [employer]iCare Alcohol Swabs 70 % PADS USE TO TEST BLOOD GLUCOSE THREE TIMES DAILY 3/10/20  [facility], MD TALLEY PENTIPS 31G X 8 MM MISC USE WITH insulin pens 2/19/20  Yes Catana Areas, MD   BASAGLAR KWIKPEN 100 UNIT/ML injection pen INJECT 60 UNITS INTO THE SKIN 2 TIMES DAILY 1/28/20  Yes Catana Areas, MD   budesonide-formoterol (SYMBICORT) 160-4.5 MCG/ACT AERO Inhale 2 puffs into the lungs daily 12/6/19  Yes Catana Areas, MD   albuterol sulfate HFA (VENTOLIN HFA) 108 (90 Base) MCG/ACT inhaler Inhale 2 puffs into the lungs every 4 hours as needed for Wheezing or Shortness of Breath 12/6/19  [facility], MD   ranolazine (RANEXA) 1000 MG extended release tablet Take 1 tablet by mouth 2 times daily 9/10/19  Yes Billie Garza MD   clopidogrel (PLAVIX) 75 MG tablet TAKE 1 TABLET BY mg, Q6H PRN    Or  acetaminophen (TYLENOL) suppository 650 mg, Q6H PRN  polyethylene glycol (GLYCOLAX) packet 17 g, Daily PRN  promethazine (PHENERGAN) tablet 12.5 mg, Q6H PRN    Or  ondansetron (ZOFRAN) injection 4 mg, Q6H PRN  atorvastatin (LIPITOR) tablet 80 mg, Nightly  aspirin chewable tablet 81 mg, Daily  potassium chloride (KLOR-CON M) extended release tablet 40 mEq, PRN    Or  potassium bicarb-citric acid (EFFER-K) effervescent tablet 40 mEq, PRN    Or  potassium chloride 10 mEq/100 mL IVPB (Peripheral Line), PRN  magnesium sulfate 2 g in 50 mL IVPB premix, PRN  enoxaparin (LOVENOX) injection 40 mg, Nightly  glucose (GLUTOSE) 40 % oral gel 15 g, PRN  dextrose 50 % IV solution, PRN  glucagon (rDNA) injection 1 mg, PRN  dextrose 5 % solution, PRN  insulin glargine (LANTUS) injection vial 40 Units, Nightly  insulin lispro (HUMALOG) injection vial 0-12 Units, TID WC  insulin lispro (HUMALOG) injection vial 0-6 Units, Nightly  nitroGLYCERIN (NITROSTAT) SL tablet 0.4 mg, Q5 Min PRN  morphine (PF) injection 2 mg, Q4H PRN        Allergies:  Patient has no known allergies. Review of Systems: SEE HPI   · Constitutional: no unanticipated weight loss. There's been no change in energy level, sleep pattern, or activity level. No fevers, chills. · Eyes: No visual changes or diplopia. No scleral icterus. · ENT: No Headaches, hearing loss or vertigo. No mouth sores or sore throat. · Cardiovascular:  Chest pain, tightness or discomfort.  No Shortness of breath. No Dyspnea on exertion, Orthopnea, Paroxysmal nocturnal dyspnea or breathlessness at rest.   No Palpitations.  No Syncope ('blackouts', 'faints', 'collapse') or dizziness. · Respiratory: No cough or wheezing, no sputum production. No hematemesis. · Gastrointestinal: No abdominal pain, appetite loss, blood in stools. No change in bowel or bladder habits. · Genitourinary: No dysuria, trouble voiding, or hematuria.   · Musculoskeletal:  No gait 05/12/2020    HGB 9.6 05/12/2020    HCT 30.0 05/12/2020    MCV 98.7 05/12/2020    RDW 15.9 05/12/2020     05/12/2020     Lab Results   Component Value Date     05/12/2020    K 4.0 05/12/2020    K 4.1 03/24/2020     05/12/2020    CO2 21 05/12/2020    BUN 22 05/12/2020    CREATININE 1.2 05/12/2020    GFRAA 55 05/12/2020    GFRAA 60 05/23/2013    AGRATIO 1.1 05/11/2020    LABGLOM 45 05/12/2020    GLUCOSE 62 05/12/2020    PROT 7.7 05/11/2020    PROT 8.1 02/15/2013    CALCIUM 9.1 05/12/2020    BILITOT 0.3 05/11/2020    ALKPHOS 129 05/11/2020    AST 25 05/11/2020    ALT 17 05/11/2020     No results found for: PTINR  Lab Results   Component Value Date    LABA1C 9.3 05/11/2020     Lab Results   Component Value Date    CKTOTAL 41 08/22/2019    CKMB 2.3 10/06/2013    CKMBINDEX 0.9 04/01/2010    TROPONINI <0.01 05/11/2020       Cardiac, Vascular and Imaging Data all Personally Reviewed in Detail by Myself      EKG: Normal sinus rhythmCannot rule out Septal infarct (cited on or before 06-APR-2020)Nonspecific T wave abnormalityAbnormal ECGWhen compared with ECG of 06-APR-2020 15:36,No significant change was foundConfirmed by Cinthya DAVID MD ([id number]) on 5/11/2020 1:04:45 PM    Echocardiogram:     Cath:    Diagnostics:   [facility]: 1/6/2020:   Left Ventricle: EF=65%, Wall motion: mild inferior HK. LVEDP=6 mmHg. No aortic valve gradient seen.  Severe coronary calcification, multiple stents all 3 arteries with double layer stents ramus and mLAD     Coronary angiogram:  Left Main Trunk (LMT): wide, ectatic, 10% stenosis  Left Anterior Descending (LAD): patent proximal stent, patent mid double layer stents with 10-20% ISR, patent distal stent  Left Circumflex (LCX): small caliber, patent distal stent, diffuse 50-60% mid stenosis  [place] (FRANCIA): large caliber, double layer of stents prox-mid with focal 50-60% ISR, diffuse 20% ISR  Right Coronary (RCA): large, dominant, ectatic, patent stent prox-mid RCA, patent distal

## 2020-05-12 NOTE — DISCHARGE INSTR - COC
artery disease involving native coronary artery of native heart with angina pectoris ([facility]) I25.119    COPD exacerbation ([facility]) J44.1    DM (diabetes mellitus), type 2, uncontrolled ([facility]) E11.65    Right knee pain M25.561    Chronic painful diabetic neuropathy ([facility]) E11.40    CAD in native artery I25.10    Dyspnea on exertion R06.09    Unstable angina ([facility]) I20.0    Sepsis ([facility]) A41.9    Acute respiratory failure with hypoxia ([facility]) J96.01    Generalized weakness R53.1    Reactive airway disease with wheezing with acute exacerbation J45. 0    Headache R51    DMII (diabetes mellitus, type 2) ([facility]) Q96.3    Metabolic acidosis O10.7    Acute-on-chronic renal failure ([facility]) N17.9, N18.9    Age-related nuclear cataract, bilateral H25.13    Chronic prescription opiate use Z79.891    Coronary stent restenosis due to progression of disease T82.855A, I25.10    Current moderate episode of major depressive disorder ([facility]) F32.1    Diabetic retinopathy of both eyes without macular edema associated with type 2 diabetes mellitus ([facility]) E11.319    Hypertensive heart and chronic kidney disease with heart failure and stage 1 through stage 4 chronic kidney disease, or unspecified chronic kidney disease ([facility]) I13.0    Hypertensive retinopathy, bilateral H35.033    Ischemic cardiomyopathy I25.5    Moderate episode of recurrent major depressive disorder ([facility]) F33.1    Nausea R11.0    Other age-related incipient cataract, bilateral H25.093    Presence of intraocular lens Z96.1    Punctate keratitis, bilateral H16.143    Regular astigmatism, bilateral H52.223    Palliative care encounter Z51.5    CKD (chronic kidney disease) stage 3, GFR 30-59 ml/min ([facility]) N18.3    Compression of brain ([facility]) G93.5    Thrombosis of superior vena cava, 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

## 2020-05-12 NOTE — PLAN OF CARE
Problem: Pain:  Goal: Pain level will decrease  Description: Pain level will decrease  5/12/2020 1038 by Favio Mendoza RN  Outcome: Ongoing  5/11/2020 2322 by Axel Chapman RN  Outcome: Ongoing  Goal: Control of acute pain  Description: Control of acute pain  5/12/2020 1038 by Favio Mendoza RN  Outcome: Ongoing  5/11/2020 2322 by Axel Chapman RN  Outcome: Ongoing  Goal: Control of chronic pain  Description: Control of chronic pain  5/12/2020 1038 by Favio Mendoza RN  Outcome: Ongoing  5/11/2020 2322 by Axel Chapman RN  Outcome: Ongoing  Goal: Patient's pain/discomfort is manageable  Description: Patient's pain/discomfort is manageable  Outcome: Ongoing     Problem: Falls - Risk of:  Goal: Will remain free from falls  Description: Will remain free from falls  5/12/2020 1038 by Favio Mendoza RN  Outcome: Ongoing  5/11/2020 2322 by Axel Chapman RN  Outcome: Ongoing  Goal: Absence of physical injury  Description: Absence of physical injury  5/12/2020 1038 by Favio Mendoza RN  Outcome: Ongoing  5/11/2020 2322 by Axel Chapman RN  Outcome: Ongoing     Problem: Discharge Planning:  Goal: Discharged to appropriate level of care  Description: Discharged to appropriate level of care  5/12/2020 1038 by Favio Mendoza RN  Outcome: Ongoing  5/11/2020 2322 by Axel Chapman RN  Outcome: Ongoing     Problem: Infection:  Goal: Will remain free from infection  Description: Will remain free from infection  Outcome: Ongoing     Problem: Safety:  Goal: Free from accidental physical injury  Description: Free from accidental physical injury  Outcome: Ongoing  Goal: Free from intentional harm  Description: Free from intentional harm  Outcome: Ongoing     Problem: Daily Care:  Goal: Daily care needs are met  Description: Daily care needs are met  Outcome: Ongoing     Problem: Skin Integrity:  Goal: Skin integrity will stabilize  Description: Skin integrity will stabilize  Outcome: Ongoing     Problem: Discharge Planning:  Goal: Patients continuum of care needs are met  Description: Patients continuum of care needs are met  Outcome: Ongoing

## 2020-05-13 NOTE — CARE COORDINATION
[facility]  Received referral from case management   Faxed orders to [facility] to resume care  Home care to see patient by   5/15/2020  Mylene Cabrera LPN    [address] Cell [phone], Fax [phone]

## 2020-05-14 RX ORDER — INSULIN ASPART 100 [IU]/ML
5-12 INJECTION, SOLUTION INTRAVENOUS; SUBCUTANEOUS
Qty: 7 PEN | Refills: 5 | Status: SHIPPED | OUTPATIENT
Start: 2020-05-14 | End: 2020-10-01 | Stop reason: SDUPTHER

## 2020-05-19 ENCOUNTER — TELEPHONE (OUTPATIENT)
Dept: PRIMARY CARE CLINIC | Age: 65
End: 2020-05-19

## 2020-05-27 ENCOUNTER — VIRTUAL VISIT (OUTPATIENT)
Dept: PAIN MANAGEMENT | Age: 65
End: 2020-05-27
Payer: COMMERCIAL

## 2020-05-27 ENCOUNTER — TELEPHONE (OUTPATIENT)
Dept: PAIN MANAGEMENT | Age: 65
End: 2020-05-27

## 2020-05-27 DIAGNOSIS — G43.111 INTRACTABLE MIGRAINE WITH AURA WITH STATUS MIGRAINOSUS: ICD-10-CM

## 2020-05-27 PROBLEM — G43.109 ACUTE ONSET AURA MIGRAINE: Status: ACTIVE | Noted: 2020-05-27

## 2020-05-27 PROCEDURE — 99214 OFFICE O/P EST MOD 30 MIN: CPT | Performed by: INTERNAL MEDICINE

## 2020-05-27 PROCEDURE — 2022F DILAT RTA XM EVC RTNOPTHY: CPT | Performed by: INTERNAL MEDICINE

## 2020-05-27 PROCEDURE — G8428 CUR MEDS NOT DOCUMENT: HCPCS | Performed by: INTERNAL MEDICINE

## 2020-05-27 PROCEDURE — 3017F COLORECTAL CA SCREEN DOC REV: CPT | Performed by: INTERNAL MEDICINE

## 2020-05-27 PROCEDURE — 3046F HEMOGLOBIN A1C LEVEL >9.0%: CPT | Performed by: INTERNAL MEDICINE

## 2020-05-27 RX ORDER — GABAPENTIN 300 MG/1
CAPSULE ORAL
Qty: 90 CAPSULE | Refills: 0 | Status: SHIPPED | OUTPATIENT
Start: 2020-05-27 | End: 2020-08-19 | Stop reason: SDUPTHER

## 2020-05-27 RX ORDER — QUETIAPINE FUMARATE 25 MG/1
25-50 TABLET, FILM COATED ORAL NIGHTLY
Qty: 60 TABLET | Refills: 0 | Status: SHIPPED | OUTPATIENT
Start: 2020-05-27 | End: 2020-06-24 | Stop reason: SDUPTHER

## 2020-05-27 RX ORDER — DIVALPROEX SODIUM 250 MG/1
250 TABLET, EXTENDED RELEASE ORAL 2 TIMES DAILY
Qty: 60 TABLET | Refills: 0 | Status: SHIPPED | OUTPATIENT
Start: 2020-05-27 | End: 2020-06-24 | Stop reason: SDUPTHER

## 2020-05-27 RX ORDER — OXYCODONE AND ACETAMINOPHEN 7.5; 325 MG/1; MG/1
1 TABLET ORAL EVERY 6 HOURS PRN
Qty: 84 TABLET | Refills: 0 | Status: SHIPPED | OUTPATIENT
Start: 2020-05-27 | End: 2020-06-24 | Stop reason: SDUPTHER

## 2020-05-27 RX ORDER — DULOXETIN HYDROCHLORIDE 60 MG/1
60 CAPSULE, DELAYED RELEASE ORAL DAILY
Qty: 30 CAPSULE | Refills: 0 | Status: SHIPPED | OUTPATIENT
Start: 2020-05-27 | End: 2020-06-24 | Stop reason: SDUPTHER

## 2020-05-27 RX ORDER — METHOCARBAMOL 500 MG/1
500 TABLET, FILM COATED ORAL 2 TIMES DAILY
Qty: 60 TABLET | Refills: 0 | Status: SHIPPED | OUTPATIENT
Start: 2020-05-27 | End: 2020-06-24 | Stop reason: SDUPTHER

## 2020-05-27 RX ORDER — UBROGEPANT 50 MG/1
TABLET ORAL
Qty: 10 TABLET | Refills: 0 | Status: SHIPPED | OUTPATIENT
Start: 2020-05-27 | End: 2020-07-22 | Stop reason: ALTCHOICE

## 2020-05-27 NOTE — PROGRESS NOTES
MCG/ACT AERO Inhale 2 puffs into the lungs daily 2 Inhaler 5    albuterol sulfate HFA (VENTOLIN HFA) 108 (90 Base) MCG/ACT inhaler Inhale 2 puffs into the lungs every 4 hours as needed for Wheezing or Shortness of Breath 3 Inhaler 5    ranolazine (RANEXA) 1000 MG extended release tablet Take 1 tablet by mouth 2 times daily 60 tablet 8    clopidogrel (PLAVIX) 75 MG tablet TAKE 1 TABLET BY MOUTH DA JULIEN 90 tablet 5    metoprolol succinate (TOPROL XL) 50 MG extended release tablet Take 0.5 tablets by mouth 2 times daily (with meals) 30 tablet 5    amLODIPine (NORVASC) 5 MG tablet Take 1 tablet by mouth daily 90 tablet 5    atorvastatin (LIPITOR) 80 MG tablet Take 1 tablet by mouth daily (Patient taking differently: Take 80 mg by mouth nightly ) 90 tablet 3    aspirin (ASPIRIN LOW DOSE) 81 MG EC tablet Take 1 tablet by mouth daily 90 tablet 5    isosorbide mononitrate (IMDUR) 30 MG extended release tablet Take 1 tablet by mouth daily 90 tablet 5    nitroGLYCERIN (NITROSTAT) 0.4 MG SL tablet Place 1 tablet under the tongue every 5 minutes as needed for Chest pain up to max of 3 total doses. If no relief after 1 dose, call 911. 25 tablet 3    topiramate (TOPAMAX) 100 MG tablet Take 1 tablet by mouth 2 times daily 60 tablet 0     No facility-administered medications prior to visit. REVIEW OF SYSTEMS:   Constitutional: Negative for fatigue and unexpected weight change. Eyes: Negative for visual disturbance. Respiratory: Negative for shortness of breath. Cardiovascular: Negative for chest pain, palpitations  Gastrointestinal: Negative for blood in stool, abdominal distention, nausea, vomiting, abdominal pain, diarrhea,constipation. Skin: Negative for color change or any abnormal bruising.    Neurological: Negative for speech difficulty, weakness and light-headedness, dizziness, tremors, sleepiness  Psychiatric/Behavioral: Negative for suicidal ideas, hallucinations, behavioral problems, self-injury, the current regimen. She was advised against drinking alcohol with the narcotic pain medicines, advised against driving or handling machinery while adjusting the dose of medicines or if having cognitive  issues related to the current medications. Risk of overdose and death, if medicines not taken as prescribed, were also discussed. If the patient develops new symptoms or if the symptoms worsen, the patient should call the office. While transcribing every attempt was made to maintain the accuracy of the note in terms of it's contents,there may have been some errors made inadvertently. Thank you for allowing me to participate in the care of this patient.     Maddie Mari MD.    Cc: Jose Mederos MD

## 2020-05-28 ENCOUNTER — TELEPHONE (OUTPATIENT)
Dept: PAIN MANAGEMENT | Age: 65
End: 2020-05-28

## 2020-06-03 ENCOUNTER — TELEPHONE (OUTPATIENT)
Dept: PRIMARY CARE CLINIC | Age: 65
End: 2020-06-03

## 2020-06-03 RX ORDER — ONDANSETRON 4 MG/1
4 TABLET, FILM COATED ORAL 3 TIMES DAILY PRN
Qty: 30 TABLET | Refills: 0 | Status: SHIPPED | OUTPATIENT
Start: 2020-06-03 | End: 2020-12-22

## 2020-06-05 ENCOUNTER — APPOINTMENT (OUTPATIENT)
Dept: CT IMAGING | Age: 65
End: 2020-06-05
Payer: COMMERCIAL

## 2020-06-05 ENCOUNTER — HOSPITAL ENCOUNTER (EMERGENCY)
Age: 65
Discharge: HOME | End: 2020-06-05
Attending: EMERGENCY MEDICINE
Payer: COMMERCIAL

## 2020-06-05 ENCOUNTER — TELEPHONE (OUTPATIENT)
Dept: PRIMARY CARE CLINIC | Age: 65
End: 2020-06-05

## 2020-06-05 LAB
ANION GAP SERPL CALCULATED.3IONS-SCNC: 12 MMOL/L (ref 3–16)
BASOPHILS ABSOLUTE: 0 K/UL (ref 0–0.2)
BASOPHILS RELATIVE PERCENT: 0.4 %
BUN BLDV-MCNC: 24 MG/DL (ref 7–20)
CALCIUM SERPL-MCNC: 9.4 MG/DL (ref 8.3–10.6)
CHLORIDE BLD-SCNC: 107 MMOL/L (ref 99–110)
CO2: 24 MMOL/L (ref 21–32)
CREAT SERPL-MCNC: 1.6 MG/DL (ref 0.6–1.2)
EOSINOPHILS ABSOLUTE: 0.2 K/UL (ref 0–0.6)
EOSINOPHILS RELATIVE PERCENT: 3.7 %
GFR AFRICAN AMERICAN: 39
GFR NON-AFRICAN AMERICAN: 32
GLUCOSE BLD-MCNC: 185 MG/DL (ref 70–99)
HCT VFR BLD CALC: 31.4 % (ref 36–48)
HEMOGLOBIN: 10.4 G/DL (ref 12–16)
LYMPHOCYTES ABSOLUTE: 1.9 K/UL (ref 1–5.1)
LYMPHOCYTES RELATIVE PERCENT: 33.3 %
MCH RBC QN AUTO: 32.2 PG (ref 26–34)
MCHC RBC AUTO-ENTMCNC: 33.1 G/DL (ref 31–36)
MCV RBC AUTO: 97.2 FL (ref 80–100)
MONOCYTES ABSOLUTE: 0.4 K/UL (ref 0–1.3)
MONOCYTES RELATIVE PERCENT: 7.6 %
NEUTROPHILS ABSOLUTE: 3.2 K/UL (ref 1.7–7.7)
NEUTROPHILS RELATIVE PERCENT: 55 %
PDW BLD-RTO: 15 % (ref 12.4–15.4)
PLATELET # BLD: 190 K/UL (ref 135–450)
PMV BLD AUTO: 8.2 FL (ref 5–10.5)
POTASSIUM SERPL-SCNC: 3.9 MMOL/L (ref 3.5–5.1)
RBC # BLD: 3.23 M/UL (ref 4–5.2)
SODIUM BLD-SCNC: 143 MMOL/L (ref 136–145)
WBC # BLD: 5.8 K/UL (ref 4–11)

## 2020-06-05 PROCEDURE — 6360000002 HC RX W HCPCS: Performed by: EMERGENCY MEDICINE

## 2020-06-05 PROCEDURE — 85025 COMPLETE CBC W/AUTO DIFF WBC: CPT

## 2020-06-05 PROCEDURE — 2580000003 HC RX 258: Performed by: EMERGENCY MEDICINE

## 2020-06-05 PROCEDURE — 80048 BASIC METABOLIC PNL TOTAL CA: CPT

## 2020-06-05 PROCEDURE — 70450 CT HEAD/BRAIN W/O DYE: CPT

## 2020-06-05 PROCEDURE — 36415 COLL VENOUS BLD VENIPUNCTURE: CPT

## 2020-06-05 PROCEDURE — 96374 THER/PROPH/DIAG INJ IV PUSH: CPT

## 2020-06-05 PROCEDURE — 96375 TX/PRO/DX INJ NEW DRUG ADDON: CPT

## 2020-06-05 PROCEDURE — 99284 EMERGENCY DEPT VISIT MOD MDM: CPT

## 2020-06-05 RX ORDER — METOCLOPRAMIDE HYDROCHLORIDE 5 MG/ML
5 INJECTION INTRAMUSCULAR; INTRAVENOUS ONCE
Status: COMPLETED | OUTPATIENT
Start: 2020-06-05 | End: 2020-06-05

## 2020-06-05 RX ORDER — DIPHENHYDRAMINE HYDROCHLORIDE 50 MG/ML
12.5 INJECTION INTRAMUSCULAR; INTRAVENOUS ONCE
Status: COMPLETED | OUTPATIENT
Start: 2020-06-05 | End: 2020-06-05

## 2020-06-05 RX ORDER — 0.9 % SODIUM CHLORIDE 0.9 %
1000 INTRAVENOUS SOLUTION INTRAVENOUS ONCE
Status: COMPLETED | OUTPATIENT
Start: 2020-06-05 | End: 2020-06-05

## 2020-06-05 RX ORDER — BUTALBITAL, ACETAMINOPHEN AND CAFFEINE 50; 325; 40 MG/1; MG/1; MG/1
1 TABLET ORAL EVERY 6 HOURS PRN
Qty: 12 TABLET | Refills: 0 | Status: SHIPPED | OUTPATIENT
Start: 2020-06-05 | End: 2020-06-10 | Stop reason: SDUPTHER

## 2020-06-05 RX ADMIN — METOCLOPRAMIDE 5 MG: 5 INJECTION, SOLUTION INTRAMUSCULAR; INTRAVENOUS at 20:33

## 2020-06-05 RX ADMIN — DIPHENHYDRAMINE HYDROCHLORIDE 12.5 MG: 50 INJECTION, SOLUTION INTRAMUSCULAR; INTRAVENOUS at 20:23

## 2020-06-05 RX ADMIN — SODIUM CHLORIDE 1000 ML: 9 INJECTION, SOLUTION INTRAVENOUS at 20:22

## 2020-06-05 ASSESSMENT — PAIN DESCRIPTION - PAIN TYPE: TYPE: ACUTE PAIN

## 2020-06-05 ASSESSMENT — PAIN DESCRIPTION - LOCATION: LOCATION: HEAD

## 2020-06-05 ASSESSMENT — PAIN DESCRIPTION - DESCRIPTORS: DESCRIPTORS: ACHING

## 2020-06-05 ASSESSMENT — PAIN SCALES - GENERAL
PAINLEVEL_OUTOF10: 10
PAINLEVEL_OUTOF10: 9

## 2020-06-05 NOTE — ED PROVIDER NOTES
ANGIOPLASTY WITH STENT PLACEMENT      CORONARY ANGIOPLASTY WITH STENT PLACEMENT      HYSTERECTOMY      JOINT REPLACEMENT Right     KNEE ARTHROSCOPY Right     KNEE SURGERY      right knee replacement    PTCA  10/2019    LAD and RCA inrtervention    TUNNELED VENOUS PORT PLACEMENT      left thigh. SMART PORT    VASCULAR SURGERY       Family History   Problem Relation Age of Onset    Diabetes Mother     Hypertension Mother     High Cholesterol Mother     Stroke Mother     Cancer Mother     No Known Problems Paternal Grandfather         lung issues      Social History     Socioeconomic History    Marital status:       Spouse name: Not on file    Number of children: 6    Years of education: Not on file    Highest education level: Not on file   Occupational History    Not on file   Social Needs    Financial resource strain: Not on file    Food insecurity     Worry: Not on file     Inability: Not on file    Transportation needs     Medical: Not on file     Non-medical: Not on file   Tobacco Use    Smoking status: Former Smoker     Packs/day: 0.50     Years: 35.00     Pack years: 17.50     Types: Cigarettes     Last attempt to quit: 2018     Years since quittin.9    Smokeless tobacco: Former User    Tobacco comment: 5/13/15 has not smoked since hospitalization -    Substance and Sexual Activity    Alcohol use: No     Alcohol/week: 0.0 standard drinks    Drug use: No    Sexual activity: Yes     Partners: Male   Lifestyle    Physical activity     Days per week: Not on file     Minutes per session: Not on file    Stress: Not on file   Relationships    Social connections     Talks on phone: Not on file     Gets together: Not on file     Attends Anglican service: Not on file     Active member of club or organization: Not on file     Attends meetings of clubs or organizations: Not on file     Relationship status: Not on file    Intimate partner violence     Fear of current or ex No Known Allergies    [unfilled]    Nursing Notes Reviewed    Physical Exam:  Vitals:    06/05/20 1823   BP: (!) 156/67   Pulse: 70   Resp: 16   Temp: 98.8 °F (37.1 °C)   SpO2: 100%       GENERAL APPEARANCE: Awake and alert. Cooperative. No acute distress. HEAD: Normocephalic. Atraumatic. EYES: EOM's grossly intact. Sclera anicteric. ENT: Mucous membranes are moist. Tolerates saliva. No trismus. NECK: Supple. No meningismus. Trachea midline. HEART: RRR. Radial pulses 2+. LUNGS: Respirations unlabored. CTAB  ABDOMEN: Soft. Non-tender. No guarding or rebound. EXTREMITIES: No acute deformities. SKIN: Warm and dry. NEUROLOGICAL: No gross facial drooping. Moves all 4 extremities spontaneously. PSYCHIATRIC: Normal mood. I have reviewed and interpreted all of the currently available lab results from this visit (if applicable):  No results found for this visit on 06/05/20. Radiographs (if obtained):  [] The following radiograph was interpreted by myself in the absence of a radiologist:  [x] Radiologist's Report Reviewed:     CT HEAD [address] (Final result)   Result time 06/05/20 21:31:40   Final result by Osmin Conte MD (06/05/20 21:31:40)                Impression:    No acute intracranial abnormality. Narrative:    EXAMINATION:  CT OF THE HEAD WITHOUT CONTRAST  6/5/2020 8:52 pm    TECHNIQUE:  CT of the head was performed without the administration of intravenous  contrast. Dose modulation, iterative reconstruction, and/or weight based  adjustment of the mA/kV was utilized to reduce the radiation dose to as low  as reasonably achievable. COMPARISON:  08/20/2019    HISTORY:  ORDERING SYSTEM PROVIDED HISTORY: headache  TECHNOLOGIST PROVIDED HISTORY:  Reason for exam:->headache  Has a \"code stroke\" or \"stroke alert\" been called? ->No  Reason for Exam: Headache (hx of thalamic stroke, chronic HA's, fibromyalgia. HA bilat temples and right side of head for weeks.  pt states HA started today  at 0800 and rates pain at 10. took percocet at 1400.)  Acuity: Acute  Type of Exam: Initial  Relevant Medical/Surgical History: diabetic, htn    FINDINGS:  BRAIN/VENTRICLES: There is no acute intracranial hemorrhage, mass effect or  midline shift.  No abnormal extra-axial fluid collection.  The gray-white  differentiation is maintained without evidence of an acute infarct.  There is  no evidence of hydrocephalus. ORBITS: The visualized portion of the orbits demonstrate no acute abnormality. SINUSES: The visualized paranasal sinuses and mastoid air cells demonstrate  no acute abnormality. SOFT TISSUES/SKULL:  No acute abnormality of the visualized skull or soft  tissues.                      EKG (if obtained): (All EKG's are interpreted by myself in the absence of a cardiologist)  Initial EKG on my interpretation shows n/a. MDM:  Differential diagnosis: Headache, SAH, meningitis    The patient was given fluid along with Reglan and Benadryl. She has longstanding history of headaches and this is not new/different but she did request to go forward with imaging. CT of the head and CT of the head and neck ordered however based on her chronic renal values I have canceled the CT with contrast as she states that her headache is feeling better and I have a low suspicion that she has an acute neurosurgical emergency and we will go forward with a noncontrast CT of the head only as I do not believe exposing her kidneys to contrast is necessary at this time. CBC unremarkable. Of note I did offer the patient a lumbar puncture however she declined. I think this is reasonable as I have a low index of suspicion for [address] or meningitis however I would like the record to reflect that I did recommend that however she declined.   I estimate there is LOW risk for SUBARACHNOID HEMORRHAGE, MENINGITIS, INTRACRANIAL HEMORRHAGE, ENCEPHALITIS, TEMPORAL ARTERITIS, PSEUDOTUMOR CEREBIR, ACUTE GLAUCOMA, SUBDURAL OR

## 2020-06-06 ENCOUNTER — CARE COORDINATION (OUTPATIENT)
Dept: CARE COORDINATION | Age: 65
End: 2020-06-06

## 2020-06-06 VITALS
RESPIRATION RATE: 20 BRPM | TEMPERATURE: 98.8 F | WEIGHT: 123.9 LBS | HEART RATE: 80 BPM | SYSTOLIC BLOOD PRESSURE: 124 MMHG | OXYGEN SATURATION: 100 % | BODY MASS INDEX: 24.32 KG/M2 | DIASTOLIC BLOOD PRESSURE: 61 MMHG | HEIGHT: 60 IN

## 2020-06-06 NOTE — ED NOTES
IV site infiltrating and \"feeling tight\". Angio removed. EMD updated and states to just do CT without contrast.   Ace wrap to arm. Then going to CT via wheelchair.    HA down to 7-8     Jackeline Shaw RN  06/05/20 2059       Jackeline Shaw RN  06/05/20 2100

## 2020-06-06 NOTE — CARE COORDINATION
Patient contacted regarding Michelle Aguiar. Discussed COVID-19 related testing which was not done at this time. Test results were not done. Patient informed of results, if available? No    Care Transition Nurse/ Ambulatory Care Manager contacted the patient by telephone to perform post discharge assessment. Verified name and  with patient as identifiers. Provided introduction to self, and explanation of the CTN/ACM role, and reason for call due to risk factors for infection and/or exposure to COVID-19. Symptoms reviewed with patient who verbalized the following symptoms: no new symptoms and no worsening symptoms. Due to no new or worsening symptoms encounter was not routed to provider for escalation. Discussed follow-up appointments. If no appointment was previously scheduled, appointment scheduling offered: [facility] follow up appointment(s):   Future Appointments   Date Time Provider Yoni De Jesus   2020  1:00 PM MD Favian Romo [facility]     Non-[facility] follow up appointment(s): na     Patient has following risk factors of: heart failure and COPD. CTN/ACM reviewed discharge instructions, medical action plan and red flags such as increased shortness of breath, increasing fever and signs of decompensation with patient who verbalized understanding. Discussed exposure protocols and quarantine with CDC Guidelines What to do if you are sick with coronavirus disease .  Patient was given an opportunity for questions and concerns. The patient agrees to contact the [facility] exposure line [phone], local [facility] department [place] [facility]: ([phone]) and PCP office for questions related to their healthcare. CTN/ACM provided contact information for future needs.     Reviewed and educated patient on any new and changed medications related to discharge diagnosis     Patient/family/caregiver given information for [facility] and agrees to enroll yes  Patient's preferred e-mail:

## 2020-06-06 NOTE — ED NOTES
[id number] wants to leave right now because her family member is outside waiting for her. HA down to 6. \"I feel better\". Has been drinking diet ginger ale. Discharge instructions with pt. Explained rx. Encouraged follow up with her PCP. Walked with pt out to car. Gait steady. Fully awake and alert. Right arm IV site improving. Minimal edema at previous IV site once ace wrap removed at 2200. No pain there.      Rashmi Mcguire RN  06/06/20 1179

## 2020-06-06 NOTE — ED NOTES
[id number] HA still at 10. this RN and Amanda Hires trying to start IV/draw labs.     One unsuccessful attempt by this RN right Ash Stout has had 2 unsuccessful attempts pelon well     Karin Taylor RN  06/05/20 2007

## 2020-06-10 ENCOUNTER — TELEPHONE (OUTPATIENT)
Dept: PRIMARY CARE CLINIC | Age: 65
End: 2020-06-10

## 2020-06-10 RX ORDER — BUTALBITAL, ACETAMINOPHEN AND CAFFEINE 50; 325; 40 MG/1; MG/1; MG/1
1 TABLET ORAL EVERY 6 HOURS PRN
Qty: 50 TABLET | Refills: 0 | Status: SHIPPED | OUTPATIENT
Start: 2020-06-10 | End: 2020-08-19

## 2020-06-10 NOTE — TELEPHONE ENCOUNTER
[facility] received a call from:    Name of Caller: Jose Daniel Malagon contact number:  [phone]    Reason for call: Patients headaches are not improving, they have kept patient from sleeping all night. Patient is requesting medication to help.     Pharmacy: [id number] [facility] [phone]

## 2020-06-10 NOTE — TELEPHONE ENCOUNTER
Talked and advised her to Hold Imdur/ Isosorbide to see if headaches resolve     Has seen Neurology at [address]

## 2020-06-17 ENCOUNTER — TELEPHONE (OUTPATIENT)
Dept: PRIMARY CARE CLINIC | Age: 65
End: 2020-06-17

## 2020-06-24 ENCOUNTER — VIRTUAL VISIT (OUTPATIENT)
Dept: PAIN MANAGEMENT | Age: 65
End: 2020-06-24
Payer: COMMERCIAL

## 2020-06-24 ENCOUNTER — TELEPHONE (OUTPATIENT)
Dept: ADMINISTRATIVE | Age: 65
End: 2020-06-24

## 2020-06-24 PROCEDURE — 99213 OFFICE O/P EST LOW 20 MIN: CPT | Performed by: INTERNAL MEDICINE

## 2020-06-24 PROCEDURE — G8427 DOCREV CUR MEDS BY ELIG CLIN: HCPCS | Performed by: INTERNAL MEDICINE

## 2020-06-24 PROCEDURE — 3017F COLORECTAL CA SCREEN DOC REV: CPT | Performed by: INTERNAL MEDICINE

## 2020-06-24 PROCEDURE — 3046F HEMOGLOBIN A1C LEVEL >9.0%: CPT | Performed by: INTERNAL MEDICINE

## 2020-06-24 PROCEDURE — 2022F DILAT RTA XM EVC RTNOPTHY: CPT | Performed by: INTERNAL MEDICINE

## 2020-06-24 RX ORDER — METHOCARBAMOL 500 MG/1
500 TABLET, FILM COATED ORAL 2 TIMES DAILY
Qty: 60 TABLET | Refills: 0 | Status: SHIPPED | OUTPATIENT
Start: 2020-06-24 | End: 2020-07-22 | Stop reason: SDUPTHER

## 2020-06-24 RX ORDER — DIVALPROEX SODIUM 250 MG/1
250 TABLET, EXTENDED RELEASE ORAL 2 TIMES DAILY
Qty: 60 TABLET | Refills: 0 | Status: SHIPPED | OUTPATIENT
Start: 2020-06-24 | End: 2020-07-22 | Stop reason: SDUPTHER

## 2020-06-24 RX ORDER — RIZATRIPTAN BENZOATE 10 MG/1
10 TABLET ORAL
Qty: 30 TABLET | Refills: 0 | Status: SHIPPED | OUTPATIENT
Start: 2020-06-24 | End: 2020-07-22 | Stop reason: SDUPTHER

## 2020-06-24 RX ORDER — OXYCODONE AND ACETAMINOPHEN 7.5; 325 MG/1; MG/1
1 TABLET ORAL EVERY 6 HOURS PRN
Qty: 84 TABLET | Refills: 0 | Status: SHIPPED | OUTPATIENT
Start: 2020-06-24 | End: 2020-07-22 | Stop reason: SDUPTHER

## 2020-06-24 RX ORDER — QUETIAPINE FUMARATE 25 MG/1
25-50 TABLET, FILM COATED ORAL NIGHTLY
Qty: 60 TABLET | Refills: 0 | Status: SHIPPED | OUTPATIENT
Start: 2020-06-24 | End: 2020-07-22 | Stop reason: SDUPTHER

## 2020-06-24 RX ORDER — DULOXETIN HYDROCHLORIDE 60 MG/1
60 CAPSULE, DELAYED RELEASE ORAL DAILY
Qty: 30 CAPSULE | Refills: 0 | Status: SHIPPED | OUTPATIENT
Start: 2020-06-24 | End: 2020-07-22 | Stop reason: SDUPTHER

## 2020-06-24 NOTE — TELEPHONE ENCOUNTER
E[facility] received a call from:    Name of Caller: Mark Perez    Relationship to patient:self    Organization name:     Best contact number: [phone]    Reason for call: Pt. called because her son was just diagnosed with Covid-19 today, and she was around him on Sunday for father'd day. Please advise if she should be tested or not, she has no symptoms as of now.

## 2020-06-24 NOTE — PROGRESS NOTES
family/social relationships are unchanged, mood is unchanged sleep patterns are unchanged, and that the overall functioning is unchanged. Patient denies misusing/abusing her narcotic pain medications or using any illegal drugs. There are No indicators for possible drug abuse, addiction or diversion problems. Patient reports she has been doing about the same. She complains she is still having headaches. She says she was not about to get the Kiara. She mentions she has to go to the ER for the headaches. She reports she isu sing Depakote ER with Neurontin. She denies any cognitive side effects. She states her breathing has been baseline     ALLERGIES: Patients list of allergies were reviewed     MEDICATIONS: Ms. Alicia Ramírez list of medications were reviewed. Her current medications are   Outpatient Medications Prior to Visit   Medication Sig Dispense Refill    butalbital-acetaminophen-caffeine (FIORICET, ESGIC) -40 MG per tablet Take 1 tablet by mouth every 6 hours as needed for Headaches 50 tablet 0    ondansetron (ZOFRAN) 4 MG tablet Take 1 tablet by mouth 3 times daily as needed for Nausea or Vomiting 30 tablet 0    QUEtiapine (SEROQUEL) 25 MG tablet Take 1-2 tablets by mouth nightly 60 tablet 0    DULoxetine (CYMBALTA) 60 MG extended release capsule Take 1 capsule by mouth daily 30 capsule 0    oxyCODONE-acetaminophen (PERCOCET) 7.5-325 MG per tablet Take 1 tablet by mouth every 6 hours as needed for Pain (max 3/day) for up to 28 days. 84 tablet 0    methocarbamol (ROBAXIN) 500 MG tablet Take 1 tablet by mouth 2 times daily 60 tablet 0    gabapentin (NEURONTIN) 300 MG capsule take1 tab am, take 2 tabs pm (Patient taking differently: Take 300 mg by mouth every morning.  take1 tab am, take 2 tabs pm) 90 capsule 0    divalproex (DEPAKOTE ER) 250 MG extended release tablet Take 1 tablet by mouth 2 times daily 60 tablet 0    Ubrogepant (UBRELVY) 50 MG TABS Talk 1 tablet as needed at start of headaches, can (before meals) Sugar < 150   Zero Unit  151-200   5 U   201- 250   8 U   251-300    12 U 7 pen 5    omeprazole (PRILOSEC) 20 MG delayed release capsule TAKE 1 CAPSULE BY MOUTH DAILY 30 capsule 1    gabapentin (NEURONTIN) 600 MG tablet Take 600 mg by mouth nightly.       torsemide (DEMADEX) 10 MG tablet TAKE TWO TABLETS BY MOUTH DAILY 60 tablet 5    Nebulizers (COMPRESSOR/NEBULIZER) MISC 1 Device by Does not apply route 4 times daily as needed (SOB / Wheezing) 1 each 0    albuterol (PROVENTIL) (2.5 MG/3ML) 0.083% nebulizer solution Take 3 mLs by nebulization every 4 hours as needed for Wheezing 120 each 5    hydrALAZINE (APRESOLINE) 50 MG tablet TAKE 1 TABLET BY MOUTH 2 TIMES DAILY 60 tablet 5    Lancet Devices (PHYSICIANS IMMEDIATE CARE DIAGNOSTICS LANCING DEV) MISC USE WITH LANCETS TO TEST BLOOD GLUCOSE 1 each 11    Blood Glucose Monitoring Suppl (TRUE METRIX METER) w/Device KIT USE TO TEST BLOOD GLUCOSE 1 kit 0    TRUE METRIX BLOOD GLUCOSE TEST strip USE TO TEST BLOOD GLUCOSE THREE TIMES DAILY 250 each 5    Pharmacist Choice Lancets MISC USE TO TEST BLOOD GLUCOSE THREE TIMES DAILY 300 each 5    UltiCare Alcohol Swabs 70 % PADS USE TO TEST BLOOD GLUCOSE THREE TIMES DAILY 100 each 5    UNIFINE PENTIPS 31G X 8 MM MISC USE WITH insulin pens 100 each 5    BASAGLAR KWIKPEN 100 UNIT/ML injection pen INJECT 60 UNITS INTO THE SKIN 2 TIMES DAILY 15 mL 5    budesonide-formoterol (SYMBICORT) 160-4.5 MCG/ACT AERO Inhale 2 puffs into the lungs daily 2 Inhaler 5    albuterol sulfate HFA (VENTOLIN HFA) 108 (90 Base) MCG/ACT inhaler Inhale 2 puffs into the lungs every 4 hours as needed for Wheezing or Shortness of Breath 3 Inhaler 5    ranolazine (RANEXA) 1000 MG extended release tablet Take 1 tablet by mouth 2 times daily 60 tablet 8    clopidogrel (PLAVIX) 75 MG tablet TAKE 1 TABLET BY MOUTH DA JULIEN 90 tablet 5    metoprolol succinate (TOPROL XL) 50 MG extended release tablet Take 0.5 tablets by mouth 2 times daily (with meals) 30 tablet 5    amLODIPine (NORVASC) 5 MG tablet Take 1 tablet by mouth daily 90 tablet 5    atorvastatin (LIPITOR) 80 MG tablet Take 1 tablet by mouth daily (Patient taking differently: Take 80 mg by mouth nightly ) 90 tablet 3    aspirin (ASPIRIN LOW DOSE) 81 MG EC tablet Take 1 tablet by mouth daily 90 tablet 5    isosorbide mononitrate (IMDUR) 30 MG extended release tablet Take 1 tablet by mouth daily 90 tablet 5    nitroGLYCERIN (NITROSTAT) 0.4 MG SL tablet Place 1 tablet under the tongue every 5 minutes as needed for Chest pain up to max of 3 total doses. If no relief after 1 dose, call 911. 25 tablet 3     No current facility-administered medications for this visit. I will continue her current medication regimen  which is part of the above treatment schedule. It has been helping with Ms. Meza's chronic  medical problems which for this visit include:   Diagnoses of Chronic pain syndrome, DDD (degenerative disc disease), lumbar, Fibromyalgia, DDD (degenerative disc disease), cervical, and Chronic painful diabetic neuropathy ([address] were pertinent to this visit. Risks and benefits of the medications and other alternative treatments  including no treatment were discussed with the patient. The common side effects of these medications were also explained to the patient. Informed verbal consent was obtained. Goals of current treatment regimen include improvement in pain, restoration of functioning- with focus on improvement in physical performance, general activity, work or disability,emotional distress, health care utilization and  decreased medication consumption. Will continue to monitor progress towards achieving/maintaining therapeutic goals with special emphasis on  1. Improvement in perceived interfernce  of pain with ADL's. Ability to do home exercises independently. Ability to do household chores indoor and/or outdoor work and social and leisure activities. Improve psychosocial and physical

## 2020-07-04 ENCOUNTER — APPOINTMENT (OUTPATIENT)
Dept: GENERAL RADIOLOGY | Age: 65
DRG: 313 | End: 2020-07-04
Payer: COMMERCIAL

## 2020-07-04 ENCOUNTER — HOSPITAL ENCOUNTER (INPATIENT)
Age: 65
LOS: 3 days | Discharge: HOME | DRG: 313 | End: 2020-07-07
Attending: EMERGENCY MEDICINE | Admitting: FAMILY MEDICINE
Payer: COMMERCIAL

## 2020-07-04 PROBLEM — I20.89 ANGINA AT REST: Status: ACTIVE | Noted: 2020-07-04

## 2020-07-04 PROBLEM — I20.8 ANGINA AT REST (HCC): Status: ACTIVE | Noted: 2020-07-04

## 2020-07-04 LAB
A/G RATIO: 1.1 (ref 1.1–2.2)
ALBUMIN SERPL-MCNC: 3.9 G/DL (ref 3.4–5)
ALP BLD-CCNC: 121 U/L (ref 40–129)
ALT SERPL-CCNC: 23 U/L (ref 10–40)
ANION GAP SERPL CALCULATED.3IONS-SCNC: 15 MMOL/L (ref 3–16)
AST SERPL-CCNC: 25 U/L (ref 15–37)
BASOPHILS ABSOLUTE: 0 K/UL (ref 0–0.2)
BASOPHILS RELATIVE PERCENT: 0.4 %
BILIRUB SERPL-MCNC: <0.2 MG/DL (ref 0–1)
BUN BLDV-MCNC: 27 MG/DL (ref 7–20)
CALCIUM SERPL-MCNC: 9.1 MG/DL (ref 8.3–10.6)
CHLORIDE BLD-SCNC: 102 MMOL/L (ref 99–110)
CO2: 25 MMOL/L (ref 21–32)
CREAT SERPL-MCNC: 1.7 MG/DL (ref 0.6–1.2)
EOSINOPHILS ABSOLUTE: 0.2 K/UL (ref 0–0.6)
EOSINOPHILS RELATIVE PERCENT: 2.9 %
GFR AFRICAN AMERICAN: 37
GFR NON-AFRICAN AMERICAN: 30
GLOBULIN: 3.5 G/DL
GLUCOSE BLD-MCNC: 310 MG/DL (ref 70–99)
HCT VFR BLD CALC: 29.9 % (ref 36–48)
HEMOGLOBIN: 9.9 G/DL (ref 12–16)
LYMPHOCYTES ABSOLUTE: 1.5 K/UL (ref 1–5.1)
LYMPHOCYTES RELATIVE PERCENT: 27.7 %
MCH RBC QN AUTO: 32.8 PG (ref 26–34)
MCHC RBC AUTO-ENTMCNC: 33.3 G/DL (ref 31–36)
MCV RBC AUTO: 98.5 FL (ref 80–100)
MONOCYTES ABSOLUTE: 0.3 K/UL (ref 0–1.3)
MONOCYTES RELATIVE PERCENT: 6.6 %
NEUTROPHILS ABSOLUTE: 3.3 K/UL (ref 1.7–7.7)
NEUTROPHILS RELATIVE PERCENT: 62.4 %
PDW BLD-RTO: 15.1 % (ref 12.4–15.4)
PLATELET # BLD: 183 K/UL (ref 135–450)
PMV BLD AUTO: 9.2 FL (ref 5–10.5)
POTASSIUM SERPL-SCNC: 3.7 MMOL/L (ref 3.5–5.1)
RBC # BLD: 3.03 M/UL (ref 4–5.2)
SODIUM BLD-SCNC: 142 MMOL/L (ref 136–145)
TOTAL PROTEIN: 7.4 G/DL (ref 6.4–8.2)
TROPONIN: <0.01 NG/ML
WBC # BLD: 5.3 K/UL (ref 4–11)

## 2020-07-04 PROCEDURE — 84484 ASSAY OF TROPONIN QUANT: CPT

## 2020-07-04 PROCEDURE — 96376 TX/PRO/DX INJ SAME DRUG ADON: CPT

## 2020-07-04 PROCEDURE — 6360000002 HC RX W HCPCS: Performed by: EMERGENCY MEDICINE

## 2020-07-04 PROCEDURE — 96375 TX/PRO/DX INJ NEW DRUG ADDON: CPT

## 2020-07-04 PROCEDURE — 80053 COMPREHEN METABOLIC PANEL: CPT

## 2020-07-04 PROCEDURE — 93005 ELECTROCARDIOGRAM TRACING: CPT | Performed by: EMERGENCY MEDICINE

## 2020-07-04 PROCEDURE — 99285 EMERGENCY DEPT VISIT HI MDM: CPT

## 2020-07-04 PROCEDURE — 71045 X-RAY EXAM CHEST 1 VIEW: CPT

## 2020-07-04 PROCEDURE — 85025 COMPLETE CBC W/AUTO DIFF WBC: CPT

## 2020-07-04 PROCEDURE — 36556 INSERT NON-TUNNEL CV CATH: CPT

## 2020-07-04 PROCEDURE — 02HV33Z INSERTION OF INFUSION DEVICE INTO SUPERIOR VENA CAVA, PERCUTANEOUS APPROACH: ICD-10-PCS | Performed by: INTERNAL MEDICINE

## 2020-07-04 PROCEDURE — 96374 THER/PROPH/DIAG INJ IV PUSH: CPT

## 2020-07-04 PROCEDURE — G0378 HOSPITAL OBSERVATION PER HR: HCPCS

## 2020-07-04 PROCEDURE — 6370000000 HC RX 637 (ALT 250 FOR IP): Performed by: EMERGENCY MEDICINE

## 2020-07-04 PROCEDURE — 96372 THER/PROPH/DIAG INJ SC/IM: CPT

## 2020-07-04 PROCEDURE — 36415 COLL VENOUS BLD VENIPUNCTURE: CPT

## 2020-07-04 PROCEDURE — 1200000000 HC SEMI PRIVATE

## 2020-07-04 PROCEDURE — 6370000000 HC RX 637 (ALT 250 FOR IP): Performed by: FAMILY MEDICINE

## 2020-07-04 PROCEDURE — 2580000003 HC RX 258: Performed by: FAMILY MEDICINE

## 2020-07-04 RX ORDER — HYDRALAZINE HYDROCHLORIDE 50 MG/1
50 TABLET, FILM COATED ORAL 2 TIMES DAILY
Status: DISCONTINUED | OUTPATIENT
Start: 2020-07-04 | End: 2020-07-07 | Stop reason: HOSPADM

## 2020-07-04 RX ORDER — SODIUM CHLORIDE 0.9 % (FLUSH) 0.9 %
10 SYRINGE (ML) INJECTION PRN
Status: DISCONTINUED | OUTPATIENT
Start: 2020-07-04 | End: 2020-07-07 | Stop reason: HOSPADM

## 2020-07-04 RX ORDER — DULOXETIN HYDROCHLORIDE 60 MG/1
60 CAPSULE, DELAYED RELEASE ORAL DAILY
Status: DISCONTINUED | OUTPATIENT
Start: 2020-07-05 | End: 2020-07-07 | Stop reason: HOSPADM

## 2020-07-04 RX ORDER — METOPROLOL SUCCINATE 25 MG/1
25 TABLET, EXTENDED RELEASE ORAL 2 TIMES DAILY WITH MEALS
Status: DISCONTINUED | OUTPATIENT
Start: 2020-07-05 | End: 2020-07-07 | Stop reason: HOSPADM

## 2020-07-04 RX ORDER — ASPIRIN 81 MG/1
81 TABLET ORAL DAILY
Status: DISCONTINUED | OUTPATIENT
Start: 2020-07-05 | End: 2020-07-07 | Stop reason: HOSPADM

## 2020-07-04 RX ORDER — CLOPIDOGREL BISULFATE 75 MG/1
75 TABLET ORAL DAILY
Status: DISCONTINUED | OUTPATIENT
Start: 2020-07-05 | End: 2020-07-07 | Stop reason: HOSPADM

## 2020-07-04 RX ORDER — POLYETHYLENE GLYCOL 3350 17 G/17G
17 POWDER, FOR SOLUTION ORAL DAILY PRN
Status: DISCONTINUED | OUTPATIENT
Start: 2020-07-04 | End: 2020-07-07 | Stop reason: HOSPADM

## 2020-07-04 RX ORDER — ACETAMINOPHEN 325 MG/1
650 TABLET ORAL EVERY 6 HOURS PRN
Status: DISCONTINUED | OUTPATIENT
Start: 2020-07-04 | End: 2020-07-07 | Stop reason: HOSPADM

## 2020-07-04 RX ORDER — TORSEMIDE 20 MG/1
20 TABLET ORAL DAILY
Status: DISCONTINUED | OUTPATIENT
Start: 2020-07-05 | End: 2020-07-07 | Stop reason: HOSPADM

## 2020-07-04 RX ORDER — MORPHINE SULFATE 4 MG/ML
4 INJECTION, SOLUTION INTRAMUSCULAR; INTRAVENOUS ONCE
Status: COMPLETED | OUTPATIENT
Start: 2020-07-04 | End: 2020-07-04

## 2020-07-04 RX ORDER — DIVALPROEX SODIUM 250 MG/1
250 TABLET, EXTENDED RELEASE ORAL 2 TIMES DAILY
Status: DISCONTINUED | OUTPATIENT
Start: 2020-07-04 | End: 2020-07-07 | Stop reason: HOSPADM

## 2020-07-04 RX ORDER — SODIUM CHLORIDE 0.9 % (FLUSH) 0.9 %
10 SYRINGE (ML) INJECTION EVERY 12 HOURS SCHEDULED
Status: DISCONTINUED | OUTPATIENT
Start: 2020-07-04 | End: 2020-07-07 | Stop reason: HOSPADM

## 2020-07-04 RX ORDER — ATORVASTATIN CALCIUM 80 MG/1
80 TABLET, FILM COATED ORAL NIGHTLY
Status: DISCONTINUED | OUTPATIENT
Start: 2020-07-04 | End: 2020-07-07 | Stop reason: HOSPADM

## 2020-07-04 RX ORDER — RANOLAZINE 500 MG/1
1000 TABLET, EXTENDED RELEASE ORAL 2 TIMES DAILY
Status: DISCONTINUED | OUTPATIENT
Start: 2020-07-04 | End: 2020-07-07 | Stop reason: HOSPADM

## 2020-07-04 RX ORDER — GABAPENTIN 300 MG/1
300 CAPSULE ORAL EVERY MORNING
Status: DISCONTINUED | OUTPATIENT
Start: 2020-07-05 | End: 2020-07-07 | Stop reason: HOSPADM

## 2020-07-04 RX ORDER — ONDANSETRON 2 MG/ML
4 INJECTION INTRAMUSCULAR; INTRAVENOUS ONCE
Status: COMPLETED | OUTPATIENT
Start: 2020-07-04 | End: 2020-07-04

## 2020-07-04 RX ORDER — MORPHINE SULFATE 10 MG/ML
4 INJECTION, SOLUTION INTRAMUSCULAR; INTRAVENOUS ONCE
Status: COMPLETED | OUTPATIENT
Start: 2020-07-04 | End: 2020-07-04

## 2020-07-04 RX ORDER — PROMETHAZINE HYDROCHLORIDE 25 MG/1
12.5 TABLET ORAL EVERY 6 HOURS PRN
Status: DISCONTINUED | OUTPATIENT
Start: 2020-07-04 | End: 2020-07-07 | Stop reason: HOSPADM

## 2020-07-04 RX ORDER — ONDANSETRON 2 MG/ML
4 INJECTION INTRAMUSCULAR; INTRAVENOUS EVERY 6 HOURS PRN
Status: DISCONTINUED | OUTPATIENT
Start: 2020-07-04 | End: 2020-07-07 | Stop reason: HOSPADM

## 2020-07-04 RX ORDER — ONDANSETRON 4 MG/1
4 TABLET, ORALLY DISINTEGRATING ORAL ONCE
Status: COMPLETED | OUTPATIENT
Start: 2020-07-04 | End: 2020-07-04

## 2020-07-04 RX ORDER — ACETAMINOPHEN 650 MG/1
650 SUPPOSITORY RECTAL EVERY 6 HOURS PRN
Status: DISCONTINUED | OUTPATIENT
Start: 2020-07-04 | End: 2020-07-07 | Stop reason: HOSPADM

## 2020-07-04 RX ORDER — ISOSORBIDE MONONITRATE 30 MG/1
30 TABLET, EXTENDED RELEASE ORAL DAILY
Status: DISCONTINUED | OUTPATIENT
Start: 2020-07-05 | End: 2020-07-07 | Stop reason: HOSPADM

## 2020-07-04 RX ORDER — AMLODIPINE BESYLATE 5 MG/1
5 TABLET ORAL DAILY
Status: DISCONTINUED | OUTPATIENT
Start: 2020-07-05 | End: 2020-07-07 | Stop reason: HOSPADM

## 2020-07-04 RX ORDER — BUDESONIDE AND FORMOTEROL FUMARATE DIHYDRATE 160; 4.5 UG/1; UG/1
2 AEROSOL RESPIRATORY (INHALATION) DAILY
Status: DISCONTINUED | OUTPATIENT
Start: 2020-07-05 | End: 2020-07-07 | Stop reason: HOSPADM

## 2020-07-04 RX ORDER — ASPIRIN 81 MG/1
324 TABLET, CHEWABLE ORAL ONCE
Status: COMPLETED | OUTPATIENT
Start: 2020-07-04 | End: 2020-07-04

## 2020-07-04 RX ORDER — GABAPENTIN 300 MG/1
600 CAPSULE ORAL NIGHTLY
Status: DISCONTINUED | OUTPATIENT
Start: 2020-07-04 | End: 2020-07-07 | Stop reason: HOSPADM

## 2020-07-04 RX ADMIN — ASPIRIN 81 MG 324 MG: 81 TABLET ORAL at 15:52

## 2020-07-04 RX ADMIN — HYDRALAZINE HYDROCHLORIDE 50 MG: 50 TABLET, FILM COATED ORAL at 23:57

## 2020-07-04 RX ADMIN — MORPHINE SULFATE 4 MG: 10 INJECTION INTRAVENOUS at 15:42

## 2020-07-04 RX ADMIN — GABAPENTIN 600 MG: 300 CAPSULE ORAL at 23:57

## 2020-07-04 RX ADMIN — ATORVASTATIN CALCIUM 80 MG: 80 TABLET, FILM COATED ORAL at 23:57

## 2020-07-04 RX ADMIN — MORPHINE SULFATE 4 MG: 4 INJECTION, SOLUTION INTRAMUSCULAR; INTRAVENOUS at 20:16

## 2020-07-04 RX ADMIN — RANOLAZINE 1000 MG: 500 TABLET, FILM COATED, EXTENDED RELEASE ORAL at 23:57

## 2020-07-04 RX ADMIN — ONDANSETRON 4 MG: 4 TABLET, ORALLY DISINTEGRATING ORAL at 15:42

## 2020-07-04 RX ADMIN — NITROGLYCERIN 1 INCH: 20 OINTMENT TOPICAL at 18:13

## 2020-07-04 RX ADMIN — SODIUM CHLORIDE, PRESERVATIVE FREE 10 ML: 5 INJECTION INTRAVENOUS at 23:57

## 2020-07-04 RX ADMIN — ONDANSETRON 4 MG: 2 INJECTION INTRAMUSCULAR; INTRAVENOUS at 20:16

## 2020-07-04 RX ADMIN — MORPHINE SULFATE 4 MG: 4 INJECTION, SOLUTION INTRAMUSCULAR; INTRAVENOUS at 18:13

## 2020-07-04 ASSESSMENT — PAIN SCALES - GENERAL
PAINLEVEL_OUTOF10: 10
PAINLEVEL_OUTOF10: 7
PAINLEVEL_OUTOF10: 8
PAINLEVEL_OUTOF10: 10

## 2020-07-04 ASSESSMENT — PAIN DESCRIPTION - PAIN TYPE: TYPE: ACUTE PAIN

## 2020-07-04 ASSESSMENT — PAIN DESCRIPTION - DESCRIPTORS
DESCRIPTORS: ACHING
DESCRIPTORS: ACHING

## 2020-07-04 ASSESSMENT — PAIN DESCRIPTION - LOCATION
LOCATION: CHEST
LOCATION: CHEST

## 2020-07-04 ASSESSMENT — PAIN - FUNCTIONAL ASSESSMENT: PAIN_FUNCTIONAL_ASSESSMENT: 0-10

## 2020-07-04 NOTE — PROGRESS NOTES
Received request from Dr. Gwen Steinberg from [address] [place] [address] ED to request transfer for admission for potential unstable angina. Patient has a history of coronary artery disease last stented in October 2019. She had a cardiac cath in January 2020 at outside facility that showed:  1) Moderate ramus and LCX stenosis with neg FFR evaluations  2) Mild LM, RCA and LAD stenosis with patent stents LAD and RCA  3) Normal LV systolic function and EDP  4) Mild atherosclerosis left radial artery  She has been on goal-directed medical therapy since. She presented to the emergency department with crushing substernal chest pain not relieved by multiple doses of nitro and morphine. Labs showed anemia and CKD at baseline but otherwise electrolytes, CBC, LFTs, and troponin were unremarkable. EKG showed normal sinus rhythm with T wave inversion in lead V5 but otherwise reassuring. Chest x-ray showed evidence of resolving interstitial edema. Due to concern for potential unstable angina the patient had central line access placed due to unstable access. She was given aspirin and call for transfer was made.

## 2020-07-04 NOTE — ED PROVIDER NOTES
eMERGENCY dEPARTMENT eNCOUnter      Pt Name: Zev Sow  MRN: [id number]  [place] 1955  Date of evaluation: 7/4/2020  Provider: Danielle Hilton MD     [address] [place] [address]       Chief Complaint   Patient presents with    Chest Pain     x 1 hour. took 3 nitro that did not help. has a cardiac history. HISTORY OF PRESENT ILLNESS   (Location/Symptom, Timing/Onset,Context/Setting, Quality, Duration, Modifying Factors, Severity) Note limiting factors. HPI    Zev Sow is a 62 y.o. female who presents to the emergency department with severe chest pain that started today. States took about 3 nitro without relief. Patient has significant cardiac history. Has coronary artery disease with stent placement. Patient has peripheral vascular disease and according to the last admission per cardiology patient is not a good candidate for bypass surgery. Has been admitted multiple times for chest pain. Patient was deemed to be a medical treatment only. Since last cath was performed earlier this year. She has hyperlipidemia hypertension coronary artery disease with significant chest pain. Has been no fever. Patient has some shortness of breath. Has history of CHF as well. Patient describes the pain as a pressure sensation. Nursing Notes were reviewed. REVIEW OFSYSTEMS    (2+ for level 4; 10+ for level 5)   Review of Systems    General: No fevers, chills or night sweats, No weight loss    Head:  No Sore throat,  No Ear Pain    Chest:  Nontender. No Cough, positive SOB, left and chest Pain    GI: No abdominal pain or vomiting    : No dysuria or hematuria    Musculoskeletal: No unrelenting pain or night pain    Neurologic: No bowel or bladder incontinence, No saddle anesthesia, No leg weakness    All other systems reviewed and are negative.         PAST MEDICAL HISTORY     Past Medical History:   Diagnosis Date    Acid reflux     Anemia     Anxiety     Arthritis     Asthma     Atrial SKIN 2 TIMES DAILY    BLOOD GLUCOSE MONITORING SUPPL (TRUE METRIX METER) W/DEVICE KIT    USE TO TEST BLOOD GLUCOSE    BUDESONIDE-FORMOTEROL (SYMBICORT) 160-4.5 MCG/ACT AERO    Inhale 2 puffs into the lungs daily    BUTALBITAL-ACETAMINOPHEN-CAFFEINE (FIORICET, ESGIC) -40 MG PER TABLET    Take 1 tablet by mouth every 6 hours as needed for Headaches    CLOPIDOGREL (PLAVIX) 75 MG TABLET    TAKE 1 TABLET BY MOUTH DA JULIEN    DIVALPROEX (DEPAKOTE ER) 250 MG EXTENDED RELEASE TABLET    Take 1 tablet by mouth 2 times daily    DULOXETINE (CYMBALTA) 60 MG EXTENDED RELEASE CAPSULE    Take 1 capsule by mouth daily    GABAPENTIN (NEURONTIN) 300 MG CAPSULE    take1 tab am, take 2 tabs pm    GABAPENTIN (NEURONTIN) 600 MG TABLET    Take 600 mg by mouth nightly. HYDRALAZINE (APRESOLINE) 50 MG TABLET    TAKE 1 TABLET BY MOUTH 2 TIMES DAILY    INSULIN ASPART (NOVOLOG FLEXPEN) 100 UNIT/ML INJECTION PEN    Inject 5-12 Units into the skin 3 times daily (before meals) Sugar < 150   Zero Unit  151-200   5 U   201- 250   8 U   251-300    12 U    ISOSORBIDE MONONITRATE (IMDUR) 30 MG EXTENDED RELEASE TABLET    Take 1 tablet by mouth daily    LANCET DEVICES (SIMPLE DIAGNOSTICS LANCING DEV) MISC    USE WITH LANCETS TO TEST BLOOD GLUCOSE    METHOCARBAMOL (ROBAXIN) 500 MG TABLET    Take 1 tablet by mouth 2 times daily    METOPROLOL SUCCINATE (TOPROL XL) 50 MG EXTENDED RELEASE TABLET    Take 0.5 tablets by mouth 2 times daily (with meals)    NEBULIZERS (COMPRESSOR/NEBULIZER) MISC    1 Device by Does not apply route 4 times daily as needed (SOB / Wheezing)    NITROGLYCERIN (NITROSTAT) 0.4 MG SL TABLET    Place 1 tablet under the tongue every 5 minutes as needed for Chest pain up to max of 3 total doses. If no relief after 1 dose, call 911.     OMEPRAZOLE (PRILOSEC) 20 MG DELAYED RELEASE CAPSULE    TAKE 1 CAPSULE BY MOUTH DAILY    ONDANSETRON (ZOFRAN) 4 MG TABLET    Take 1 tablet by mouth 3 times daily as needed for Nausea or Vomiting OXYCODONE-ACETAMINOPHEN (PERCOCET) 7.5-325 MG PER TABLET    Take 1 tablet by mouth every 6 hours as needed for Pain (max 3/day) for up to 28 days. PHARMACIST CHOICE LANCETS MISC    USE TO TEST BLOOD GLUCOSE THREE TIMES DAILY    QUETIAPINE (SEROQUEL) 25 MG TABLET    Take 1-2 tablets by mouth nightly    RANOLAZINE (RANEXA) 1000 MG EXTENDED RELEASE TABLET    Take 1 tablet by mouth 2 times daily    RIZATRIPTAN (MAXALT) 10 MG TABLET    Take 1 tablet by mouth once as needed for Migraine May repeat in 2 hours if needed    TORSEMIDE (DEMADEX) 10 MG TABLET    TAKE TWO TABLETS BY MOUTH DAILY    TRUE METRIX BLOOD GLUCOSE TEST STRIP    USE TO TEST BLOOD GLUCOSE THREE TIMES DAILY    UBROGEPANT (UBRELVY) 50 MG TABS    Talk 1 tablet as needed at start of headaches, can repeat in 24 hours    ULTICARE ALCOHOL SWABS 70 % PADS    USE TO TEST BLOOD GLUCOSE THREE TIMES DAILY    UNIFINE PENTIPS 31G X 8 MM MISC    USE WITH insulin pens       ALLERGIES     Patient has no known allergies. FAMILY HISTORY       Family History   Problem Relation Age of Onset    Diabetes Mother     Hypertension Mother     High Cholesterol Mother     Stroke Mother     Cancer Mother     No Known Problems Paternal Grandfather         lung issues         SOCIAL HISTORY       Social History     Socioeconomic History    Marital status:       Spouse name: None    Number of children: 6    Years of education: None    Highest education level: None   Occupational History    None   Social Needs    Financial resource strain: None    Food insecurity     Worry: None     Inability: None    Transportation needs     Medical: None     Non-medical: None   Tobacco Use    Smoking status: Former Smoker     Packs/day: 0.50     Years: 35.00     Pack years: 17.50     Types: Cigarettes     Last attempt to quit: 2018     Years since quittin.0    Smokeless tobacco: Former User    Tobacco comment: 5/13/15 has not smoked since hospitalization - lilly Substance and Sexual Activity    Alcohol use: No     Alcohol/week: 0.0 standard drinks    Drug use: No    Sexual activity: Yes     Partners: Male   Lifestyle    Physical activity     Days per week: None     Minutes per session: None    Stress: None   Relationships    Social connections     Talks on phone: None     Gets together: None     Attends [employer] service: None     Active member of club or organization: None     Attends meetings of clubs or organizations: None     Relationship status: None    Intimate partner violence     Fear of current or ex partner: None     Emotionally abused: None     Physically abused: None     Forced sexual activity: None   Other Topics Concern    None   Social History Narrative    None       SCREENINGS           PHYSICAL EXAM    (up to 7 for level 4, 8 or more for level 5)     ED Triage Vitals   BP Temp Temp src Pulse Resp SpO2 Height Weight   -- -- -- -- -- -- -- --       Physical Exam    General: Alert and awake ×3. Nontoxic appearance. Well-developed well-nourished does not 72-year-old black female who is in mild respiratory distress  HEENT: Normocephalic atraumatic. Neck is supple. Airway intact. No adenopathy  Cardiac: Regular rate and rhythm with no murmurs rubs or gallops  Pulmonary: Lungs are clear in all lung fields. No wheezing. No Rales. Abdomen: Soft and nontender. Negative hepatosplenomegaly. Bowel sounds are active  Extremities: Moving all extremities. No calf tenderness. Peripheral pulses all intact  Skin: No skin lesions. No rashes  Neurologic: Cranial nerves II through XII was grossly intact. Nonfocal neurological exam  Psychiatric: Patient is pleasant. Mood is appropriate. DIAGNOSTIC RESULTS     EKG (Per Emergency Physician):   Normal sinus rhythm ventricular rate of 86 nonspecific ST-T wave changes no ischemic changes. Normal EKG    RADIOLOGY (Per Emergency Physician):        Interpretation per the Radiologist below, if available at the time of this note:  Xr Chest Portable    Result Date: 7/4/2020  EXAMINATION: ONE XRAY VIEW OF THE CHEST 7/4/2020 5:54 pm COMPARISON: 07/04/2020 HISTORY: ORDERING SYSTEM PROVIDED HISTORY: line placement TECHNOLOGIST PROVIDED HISTORY: Reason for exam:->line placement Reason for Exam: picc line placement Acuity: Acute Type of Exam: Initial Relevant Medical/Surgical History: htn, heart disease, FINDINGS: The right IJ central line distal tip is in the proximal SVC. No focal infiltrate, pleural effusion or pneumothorax is demonstrated. Interstitial edema has resolved. The heart is stable in size. No acute osseous abnormality is seen. 1. The right IJ central line is in the SVC. No pneumothorax. 2. Resolved interstitial edema. Xr Chest Portable    Result Date: 7/4/2020  EXAMINATION: ONE XRAY VIEW OF THE CHEST 7/4/2020 3:51 pm COMPARISON: 05/11/2020 HISTORY: ORDERING SYSTEM PROVIDED HISTORY: CP TECHNOLOGIST PROVIDED HISTORY: Reason for exam:->CP Reason for Exam: Chest Pain (x 1 hour. took 3 nitro that did not help. has a cardiac history. ) Acuity: Acute Type of Exam: Initial Relevant Medical/Surgical History: heart disease, copd, cad, htn, FINDINGS: There is interstitial edema. No focal infiltrate, pleural effusion or pneumothorax is demonstrated. The heart is stable in size. No acute osseous abnormality is seen.      Interstitial edema       ED BEDSIDE ULTRASOUND:   Performed by ED Physician - none    LABS:  Labs Reviewed   CBC WITH AUTO DIFFERENTIAL - Abnormal; Notable for the following components:       Result Value    RBC 3.03 (*)     Hemoglobin 9.9 (*)     Hematocrit 29.9 (*)     All other components within normal limits    Narrative:     Performed at:  [facility]) - [facility]  [address] [facility]   Phone [phone]   COMPREHENSIVE METABOLIC PANEL - Abnormal; Notable for the following components:    Glucose 310 (*)     BUN 27 (*)     CREATININE (!) 148/56 (!) 167/65 (!) 163/56   Pulse: 67 66 65 63   Resp: 13 17 14 15   Temp:       TempSrc:       SpO2: 100% 94% 100% 100%   Weight:           Medications   morphine injection 4 mg (4 mg Intramuscular Given 7/4/20 1542)   ondansetron (ZOFRAN-ODT) disintegrating tablet 4 mg (4 mg Oral Given 7/4/20 1542)   aspirin chewable tablet 324 mg (324 mg Oral Given 7/4/20 1552)   morphine (PF) injection 4 mg (4 mg Intravenous Given 7/4/20 1813)   nitroglycerin (NITRO-BID) 2 % ointment 1 inch (1 inch Topical Given 7/4/20 1813)       [facility]. This is a 81-year-old female with history of coronary disease and significant peripheral vascular disease is with chest pain acute onset an hour prior to arrival unrelieved with aspirin and 3 nitroglycerin. On arrival patient still having. Sternal pressure sensation. Patient is not surgical candidate according to cardiology. Patient is medical treatment only. Patient given morphine which has not helped. Very difficult to line placement. I will place a central line. Patient already had ports placed in both subclavian and femorals and has all clotted off. We will have to put in a right internal jugular. Patient was then given IV morphine nitroglycerin paste. Patient's enzymes are negative at this time. Given her pressure sensation patient will benefit for admission. Discussed with the hospitalist.    Barron Och:         CRITICAL CARE TIME   Total CriticalCare time was 0 minutes, excluding separately reportable procedures. There was a high probability of clinically significant/life threatening deterioration in the patient's condition which required my urgent intervention. CONSULTS:  None    PROCEDURES:  Unless otherwise noted below, none     [unfilled]    FINAL IMPRESSION      1. Unstable angina pectoris (HCC)          DISPOSITION/PLAN   DISPOSITION        PATIENT REFERRED TO:  No follow-up provider specified.     DISCHARGE MEDICATIONS:  New Prescriptions    No medications on file          (Please note:  Portions of this note were completed with a voice recognition program.Efforts were made to edit the dictations but occasionally words and phrases are mis-transcribed.)  Form v2016. [id number].[id number]    Jim SHIRLEY MD (electronically signed)  Emergency Medicine Provider        Jennifer Rainey MD  07/04/20 9901

## 2020-07-04 NOTE — ED NOTES
Bed Assigned - 3110  Report - [id number]    Will return call with transport info [id number]     Radha Lo RN  07/04/20 1946

## 2020-07-05 PROBLEM — K21.9 GASTRO-ESOPHAGEAL REFLUX DISEASE WITHOUT ESOPHAGITIS: Chronic | Status: ACTIVE | Noted: 2017-05-05

## 2020-07-05 PROBLEM — E11.9 DMII (DIABETES MELLITUS, TYPE 2) (HCC): Chronic | Status: ACTIVE | Noted: 2019-08-20

## 2020-07-05 PROBLEM — I48.91 A-FIB (HCC): Chronic | Status: ACTIVE | Noted: 2017-05-05

## 2020-07-05 LAB
ANION GAP SERPL CALCULATED.3IONS-SCNC: 15 MMOL/L (ref 3–16)
BASOPHILS ABSOLUTE: 0 K/UL (ref 0–0.2)
BASOPHILS RELATIVE PERCENT: 0.3 %
BUN BLDV-MCNC: 22 MG/DL (ref 7–20)
CALCIUM SERPL-MCNC: 9.1 MG/DL (ref 8.3–10.6)
CHLORIDE BLD-SCNC: 100 MMOL/L (ref 99–110)
CO2: 25 MMOL/L (ref 21–32)
CREAT SERPL-MCNC: 1.3 MG/DL (ref 0.6–1.2)
EOSINOPHILS ABSOLUTE: 0.2 K/UL (ref 0–0.6)
EOSINOPHILS RELATIVE PERCENT: 3.8 %
GFR AFRICAN AMERICAN: 50
GFR NON-AFRICAN AMERICAN: 41
GLUCOSE BLD-MCNC: 225 MG/DL (ref 70–99)
HCT VFR BLD CALC: 35.5 % (ref 36–48)
HEMOGLOBIN: 11.7 G/DL (ref 12–16)
LYMPHOCYTES ABSOLUTE: 1.1 K/UL (ref 1–5.1)
LYMPHOCYTES RELATIVE PERCENT: 23 %
MAGNESIUM: 1.9 MG/DL (ref 1.8–2.4)
MCH RBC QN AUTO: 32.2 PG (ref 26–34)
MCHC RBC AUTO-ENTMCNC: 33 G/DL (ref 31–36)
MCV RBC AUTO: 97.7 FL (ref 80–100)
MONOCYTES ABSOLUTE: 0.3 K/UL (ref 0–1.3)
MONOCYTES RELATIVE PERCENT: 6.4 %
NEUTROPHILS ABSOLUTE: 3.3 K/UL (ref 1.7–7.7)
NEUTROPHILS RELATIVE PERCENT: 66.5 %
PDW BLD-RTO: 15 % (ref 12.4–15.4)
PLATELET # BLD: 183 K/UL (ref 135–450)
PMV BLD AUTO: 9.4 FL (ref 5–10.5)
POTASSIUM REFLEX MAGNESIUM: 3.4 MMOL/L (ref 3.5–5.1)
PRO-BNP: 506 PG/ML (ref 0–124)
RBC # BLD: 3.63 M/UL (ref 4–5.2)
SODIUM BLD-SCNC: 140 MMOL/L (ref 136–145)
WBC # BLD: 5 K/UL (ref 4–11)

## 2020-07-05 PROCEDURE — 6360000002 HC RX W HCPCS: Performed by: INTERNAL MEDICINE

## 2020-07-05 PROCEDURE — 85025 COMPLETE CBC W/AUTO DIFF WBC: CPT

## 2020-07-05 PROCEDURE — 96375 TX/PRO/DX INJ NEW DRUG ADDON: CPT

## 2020-07-05 PROCEDURE — 96376 TX/PRO/DX INJ SAME DRUG ADON: CPT

## 2020-07-05 PROCEDURE — 99223 1ST HOSP IP/OBS HIGH 75: CPT | Performed by: INTERNAL MEDICINE

## 2020-07-05 PROCEDURE — 36415 COLL VENOUS BLD VENIPUNCTURE: CPT

## 2020-07-05 PROCEDURE — 83880 ASSAY OF NATRIURETIC PEPTIDE: CPT

## 2020-07-05 PROCEDURE — 1200000000 HC SEMI PRIVATE

## 2020-07-05 PROCEDURE — 6360000002 HC RX W HCPCS: Performed by: FAMILY MEDICINE

## 2020-07-05 PROCEDURE — 2580000003 HC RX 258: Performed by: FAMILY MEDICINE

## 2020-07-05 PROCEDURE — 6370000000 HC RX 637 (ALT 250 FOR IP): Performed by: FAMILY MEDICINE

## 2020-07-05 PROCEDURE — 6370000000 HC RX 637 (ALT 250 FOR IP): Performed by: INTERNAL MEDICINE

## 2020-07-05 PROCEDURE — 94760 N-INVAS EAR/PLS OXIMETRY 1: CPT

## 2020-07-05 PROCEDURE — 2500000003 HC RX 250 WO HCPCS: Performed by: INTERNAL MEDICINE

## 2020-07-05 PROCEDURE — G0378 HOSPITAL OBSERVATION PER HR: HCPCS

## 2020-07-05 PROCEDURE — 80048 BASIC METABOLIC PNL TOTAL CA: CPT

## 2020-07-05 PROCEDURE — 2700000000 HC OXYGEN THERAPY PER DAY

## 2020-07-05 PROCEDURE — 96372 THER/PROPH/DIAG INJ SC/IM: CPT

## 2020-07-05 PROCEDURE — 94640 AIRWAY INHALATION TREATMENT: CPT

## 2020-07-05 PROCEDURE — 83735 ASSAY OF MAGNESIUM: CPT

## 2020-07-05 RX ORDER — MORPHINE SULFATE 2 MG/ML
1 INJECTION, SOLUTION INTRAMUSCULAR; INTRAVENOUS EVERY 4 HOURS PRN
Status: DISCONTINUED | OUTPATIENT
Start: 2020-07-05 | End: 2020-07-07 | Stop reason: HOSPADM

## 2020-07-05 RX ORDER — PROCHLORPERAZINE EDISYLATE 5 MG/ML
10 INJECTION INTRAMUSCULAR; INTRAVENOUS EVERY 6 HOURS PRN
Status: DISCONTINUED | OUTPATIENT
Start: 2020-07-05 | End: 2020-07-07 | Stop reason: HOSPADM

## 2020-07-05 RX ORDER — HYDROCODONE BITARTRATE AND ACETAMINOPHEN 5; 325 MG/1; MG/1
1 TABLET ORAL EVERY 6 HOURS PRN
Status: DISCONTINUED | OUTPATIENT
Start: 2020-07-05 | End: 2020-07-07 | Stop reason: HOSPADM

## 2020-07-05 RX ORDER — FUROSEMIDE 10 MG/ML
80 INJECTION INTRAMUSCULAR; INTRAVENOUS ONCE
Status: COMPLETED | OUTPATIENT
Start: 2020-07-05 | End: 2020-07-05

## 2020-07-05 RX ORDER — PANTOPRAZOLE SODIUM 40 MG/1
40 TABLET, DELAYED RELEASE ORAL
Status: DISCONTINUED | OUTPATIENT
Start: 2020-07-05 | End: 2020-07-07 | Stop reason: HOSPADM

## 2020-07-05 RX ORDER — TIZANIDINE 4 MG/1
4 TABLET ORAL ONCE
Status: COMPLETED | OUTPATIENT
Start: 2020-07-05 | End: 2020-07-05

## 2020-07-05 RX ORDER — NITROGLYCERIN 0.4 MG/1
0.4 TABLET SUBLINGUAL EVERY 5 MIN PRN
Status: DISCONTINUED | OUTPATIENT
Start: 2020-07-05 | End: 2020-07-07 | Stop reason: HOSPADM

## 2020-07-05 RX ADMIN — METOPROLOL SUCCINATE 25 MG: 25 TABLET, EXTENDED RELEASE ORAL at 10:59

## 2020-07-05 RX ADMIN — MORPHINE SULFATE 1 MG: 2 INJECTION, SOLUTION INTRAMUSCULAR; INTRAVENOUS at 18:19

## 2020-07-05 RX ADMIN — GABAPENTIN 600 MG: 300 CAPSULE ORAL at 21:35

## 2020-07-05 RX ADMIN — DIVALPROEX SODIUM 250 MG: 250 TABLET, EXTENDED RELEASE ORAL at 11:08

## 2020-07-05 RX ADMIN — ONDANSETRON 4 MG: 2 INJECTION INTRAMUSCULAR; INTRAVENOUS at 11:02

## 2020-07-05 RX ADMIN — SODIUM CHLORIDE, PRESERVATIVE FREE 10 ML: 5 INJECTION INTRAVENOUS at 11:06

## 2020-07-05 RX ADMIN — SODIUM CHLORIDE, PRESERVATIVE FREE 10 ML: 5 INJECTION INTRAVENOUS at 21:35

## 2020-07-05 RX ADMIN — ATORVASTATIN CALCIUM 80 MG: 80 TABLET, FILM COATED ORAL at 21:35

## 2020-07-05 RX ADMIN — TORSEMIDE 20 MG: 20 TABLET ORAL at 10:58

## 2020-07-05 RX ADMIN — TIZANIDINE 4 MG: 4 TABLET ORAL at 01:07

## 2020-07-05 RX ADMIN — FAMOTIDINE 20 MG: 10 INJECTION, SOLUTION INTRAVENOUS at 10:58

## 2020-07-05 RX ADMIN — FAMOTIDINE 20 MG: 10 INJECTION, SOLUTION INTRAVENOUS at 21:34

## 2020-07-05 RX ADMIN — DULOXETINE HYDROCHLORIDE 60 MG: 60 CAPSULE, DELAYED RELEASE ORAL at 10:59

## 2020-07-05 RX ADMIN — HYDROCODONE BITARTRATE AND ACETAMINOPHEN 1 TABLET: 5; 325 TABLET ORAL at 09:18

## 2020-07-05 RX ADMIN — ONDANSETRON 4 MG: 2 INJECTION INTRAMUSCULAR; INTRAVENOUS at 02:08

## 2020-07-05 RX ADMIN — ASPIRIN 81 MG: 81 TABLET, COATED ORAL at 10:58

## 2020-07-05 RX ADMIN — HYDROCODONE BITARTRATE AND ACETAMINOPHEN 1 TABLET: 5; 325 TABLET ORAL at 03:46

## 2020-07-05 RX ADMIN — RANOLAZINE 1000 MG: 500 TABLET, FILM COATED, EXTENDED RELEASE ORAL at 10:58

## 2020-07-05 RX ADMIN — ENOXAPARIN SODIUM 30 MG: 30 INJECTION SUBCUTANEOUS at 10:58

## 2020-07-05 RX ADMIN — DIVALPROEX SODIUM 250 MG: 250 TABLET, EXTENDED RELEASE ORAL at 21:35

## 2020-07-05 RX ADMIN — METOPROLOL SUCCINATE 25 MG: 25 TABLET, EXTENDED RELEASE ORAL at 17:01

## 2020-07-05 RX ADMIN — FAMOTIDINE 20 MG: 10 INJECTION, SOLUTION INTRAVENOUS at 01:07

## 2020-07-05 RX ADMIN — AMLODIPINE BESYLATE 5 MG: 5 TABLET ORAL at 10:58

## 2020-07-05 RX ADMIN — HYDRALAZINE HYDROCHLORIDE 50 MG: 50 TABLET, FILM COATED ORAL at 10:58

## 2020-07-05 RX ADMIN — HYDRALAZINE HYDROCHLORIDE 50 MG: 50 TABLET, FILM COATED ORAL at 21:35

## 2020-07-05 RX ADMIN — GABAPENTIN 300 MG: 300 CAPSULE ORAL at 10:59

## 2020-07-05 RX ADMIN — ISOSORBIDE MONONITRATE 30 MG: 30 TABLET, EXTENDED RELEASE ORAL at 10:59

## 2020-07-05 RX ADMIN — CLOPIDOGREL BISULFATE 75 MG: 75 TABLET ORAL at 10:59

## 2020-07-05 RX ADMIN — RANOLAZINE 1000 MG: 500 TABLET, FILM COATED, EXTENDED RELEASE ORAL at 21:35

## 2020-07-05 RX ADMIN — BUDESONIDE AND FORMOTEROL FUMARATE DIHYDRATE 2 PUFF: 160; 4.5 AEROSOL RESPIRATORY (INHALATION) at 08:21

## 2020-07-05 RX ADMIN — LIDOCAINE HYDROCHLORIDE: 20 SOLUTION ORAL; TOPICAL at 01:07

## 2020-07-05 RX ADMIN — PROCHLORPERAZINE EDISYLATE 10 MG: 5 INJECTION INTRAMUSCULAR; INTRAVENOUS at 18:19

## 2020-07-05 RX ADMIN — FUROSEMIDE 80 MG: 10 INJECTION, SOLUTION INTRAMUSCULAR; INTRAVENOUS at 02:02

## 2020-07-05 ASSESSMENT — PAIN DESCRIPTION - FREQUENCY
FREQUENCY: CONTINUOUS

## 2020-07-05 ASSESSMENT — PAIN SCALES - GENERAL
PAINLEVEL_OUTOF10: 8
PAINLEVEL_OUTOF10: 10
PAINLEVEL_OUTOF10: 8
PAINLEVEL_OUTOF10: 8
PAINLEVEL_OUTOF10: 5
PAINLEVEL_OUTOF10: 8

## 2020-07-05 ASSESSMENT — PAIN DESCRIPTION - ORIENTATION
ORIENTATION_2: LEFT
ORIENTATION_2: LEFT
ORIENTATION: ANTERIOR;RIGHT
ORIENTATION: RIGHT;ANTERIOR
ORIENTATION_2: LEFT
ORIENTATION: ANTERIOR;RIGHT

## 2020-07-05 ASSESSMENT — PAIN DESCRIPTION - ONSET
ONSET: ON-GOING
ONSET_2: ON-GOING
ONSET_2: ON-GOING
ONSET: ON-GOING
ONSET_2: ON-GOING
ONSET: ON-GOING

## 2020-07-05 ASSESSMENT — PAIN DESCRIPTION - DESCRIPTORS
DESCRIPTORS: ACHING
DESCRIPTORS_2: ACHING
DESCRIPTORS: ACHING
DESCRIPTORS_2: ACHING
DESCRIPTORS: ACHING
DESCRIPTORS_2: ACHING

## 2020-07-05 ASSESSMENT — PAIN DESCRIPTION - PAIN TYPE
TYPE: ACUTE PAIN
TYPE_2: ACUTE PAIN

## 2020-07-05 ASSESSMENT — PAIN DESCRIPTION - LOCATION
LOCATION: CHEST
LOCATION_2: CHEST
LOCATION: CHEST
LOCATION: HEAD
LOCATION_2: CHEST
LOCATION: HEAD
LOCATION: HEAD
LOCATION_2: CHEST

## 2020-07-05 ASSESSMENT — PAIN - FUNCTIONAL ASSESSMENT
PAIN_FUNCTIONAL_ASSESSMENT: ACTIVITIES ARE NOT PREVENTED

## 2020-07-05 ASSESSMENT — PAIN DESCRIPTION - PROGRESSION
CLINICAL_PROGRESSION: GRADUALLY IMPROVING
CLINICAL_PROGRESSION_2: NOT CHANGED
CLINICAL_PROGRESSION: NOT CHANGED
CLINICAL_PROGRESSION: GRADUALLY IMPROVING
CLINICAL_PROGRESSION_2: NOT CHANGED
CLINICAL_PROGRESSION_2: GRADUALLY WORSENING
CLINICAL_PROGRESSION: GRADUALLY WORSENING
CLINICAL_PROGRESSION: GRADUALLY WORSENING

## 2020-07-05 ASSESSMENT — PAIN DESCRIPTION - DURATION
DURATION_2: CONTINUOUS

## 2020-07-05 ASSESSMENT — PAIN DESCRIPTION - INTENSITY
RATING_2: 6

## 2020-07-05 NOTE — PROGRESS NOTES
Pt had 2 vomiting episodes of green watery bile, md perfect served and he placed order for GI consult.   Electronically signed by Destiny Cabrera RN on 7/5/2020 at 6:07 PM

## 2020-07-05 NOTE — PLAN OF CARE
Problem: Pain:  Goal: Pain level will decrease  Description: Pain level will decrease  Outcome: Ongoing  Note: Pt assessed for pain. Pt in pain and assessed with 0-10 pain rating scale. Pt given prescribed analgesic for pain. (See eMar) Pt satisfied with pain relief thus far. Will reassess and continue to monitor. Goal: Control of acute pain  Description: Control of acute pain  Outcome: Ongoing  Note: Pt assessed for pain. Pt in pain and assessed with 0-10 pain rating scale. Pt given prescribed analgesic for pain. (See eMar) Pt satisfied with pain relief thus far. Will reassess and continue to monitor. Goal: Control of chronic pain  Description: Control of chronic pain  Outcome: Ongoing  Note: Pt assessed for pain. Pt in pain and assessed with 0-10 pain rating scale. Pt given prescribed analgesic for pain. (See eMar) Pt satisfied with pain relief thus far. Will reassess and continue to monitor.

## 2020-07-05 NOTE — PROGRESS NOTES
Pt to room 3123 from [facility] ED. A&Ox4. Oriented to room and call light. Pt c/o chest pain rated 7/10. Nitro paste on chest from ED. No other complaints at this time. Call light and bedside table within reach.

## 2020-07-05 NOTE — H&P
History and Physical    Admit Date: 7/4/2020    Patient's PCP: Dr. Shawn Golden MD    Chief complaint:   Chief Complaint   Patient presents with    Chest Pain     x 1 hour. took 3 nitro that did not help. has a cardiac history. HISTORY OF PRESENT ILLNESS:    This is a 62 y.o. female presenting with chest pain, 1 day, sudden onset,  left-sided with radiation to the left upper extremity, pressure-like quality, severe intensity, no aggravators, partial relief from nitrate therapy, associated with dyspnea. She denies fever, chills, rigors, night sweats, cough, hemoptysis, loss of consciousness, leg pain, leg swelling. Review of system is positive for chronic orthopnea and nocturia.     Past Medical / Surgical History:    Past Medical History:   Diagnosis Date    Acid reflux     Anemia     Anxiety     Arthritis     Asthma     Atrial fibrillation ([facility])     Blood transfusion reaction     CAD (coronary artery disease) 12/3/2012    Cerebral artery occlusion with cerebral infarction ([facility])     CHF (congestive heart failure) ([facility])     Chronic kidney disease     40% kidney functiom    COPD (chronic obstructive pulmonary disease) ([facility])     Depression     DM2 (diabetes mellitus, type 2) ([facility]     Dysarthria     Fibromyalgia 6/7/2016    Headache(784.0) 2/19/2013    Hemisensory loss     Hyperlipidemia     Hypertension     IBS (irritable bowel syndrome)     Inferior vena cava occlusion (HCC)     Irritable bowel syndrome     Keratitis     MI, old     Neuropathy     Superior vena cava obstruction     Temporal arteritis ([facility] 2/24/2014       Past Surgical History:   Procedure Laterality Date    ABLATION OF DYSRHYTHMIC FOCUS      ARTERY BIOPSY Right 04/23/2014    RIGHT TEMPORAL ARTERY BIOPSY    CATARACT REMOVAL Bilateral     CHOLECYSTECTOMY      CORONARY ANGIOPLASTY WITH STENT PLACEMENT      CORONARY ANGIOPLASTY WITH STENT PLACEMENT      HYSTERECTOMY      JOINT REPLACEMENT Right     KNEE ARTHROSCOPY Right     KNEE SURGERY      right knee replacement    PTCA  10/2019    LAD and RCA inrtervention    TUNNELED VENOUS PORT PLACEMENT      left thigh. SMART PORT    VASCULAR SURGERY         Medications Prior to Admission:    No current facility-administered medications on file prior to encounter. Current Outpatient Medications on File Prior to Encounter   Medication Sig Dispense Refill    aspirin (ASPIRIN LOW DOSE) 81 MG EC tablet Take 1 tablet by mouth daily 90 tablet 5    isosorbide mononitrate (IMDUR) 30 MG extended release tablet Take 1 tablet by mouth daily 90 tablet 5    QUEtiapine (SEROQUEL) 25 MG tablet Take 1-2 tablets by mouth nightly 60 tablet 0    DULoxetine (CYMBALTA) 60 MG extended release capsule Take 1 capsule by mouth daily 30 capsule 0    oxyCODONE-acetaminophen (PERCOCET) 7.5-325 MG per tablet Take 1 tablet by mouth every 6 hours as needed for Pain (max 3/day) for up to 28 days. 84 tablet 0    methocarbamol (ROBAXIN) 500 MG tablet Take 1 tablet by mouth 2 times daily 60 tablet 0    divalproex (DEPAKOTE ER) 250 MG extended release tablet Take 1 tablet by mouth 2 times daily 60 tablet 0    rizatriptan (MAXALT) 10 MG tablet Take 1 tablet by mouth once as needed for Migraine May repeat in 2 hours if needed 30 tablet 0    butalbital-acetaminophen-caffeine (FIORICET, ESGIC) -40 MG per tablet Take 1 tablet by mouth every 6 hours as needed for Headaches 50 tablet 0    ondansetron (ZOFRAN) 4 MG tablet Take 1 tablet by mouth 3 times daily as needed for Nausea or Vomiting 30 tablet 0    gabapentin (NEURONTIN) 300 MG capsule take1 tab am, take 2 tabs pm (Patient taking differently: Take 300 mg by mouth every morning.  take1 tab am, take 2 tabs pm) 90 capsule 0    Ubrogepant (UBRELVY) 50 MG TABS Talk 1 tablet as needed at start of headaches, can repeat in 24 hours 10 tablet 0    insulin aspart (NOVOLOG FLEXPEN) 100 UNIT/ML injection pen Inject 5-12 Units into the skin 3 times daily (before meals) Sugar < 150   Zero Unit  151-200   5 U   201- 250   8 U   251-300    12 U 7 pen 5    omeprazole (PRILOSEC) 20 MG delayed release capsule TAKE 1 CAPSULE BY MOUTH DAILY 30 capsule 1    gabapentin (NEURONTIN) 600 MG tablet Take 600 mg by mouth nightly.       torsemide (DEMADEX) 10 MG tablet TAKE TWO TABLETS BY MOUTH DAILY 60 tablet 5    Nebulizers (COMPRESSOR/NEBULIZER) MISC 1 Device by Does not apply route 4 times daily as needed (SOB / Wheezing) 1 each 0    albuterol (PROVENTIL) (2.5 MG/3ML) 0.083% nebulizer solution Take 3 mLs by nebulization every 4 hours as needed for Wheezing 120 each 5    hydrALAZINE (APRESOLINE) 50 MG tablet TAKE 1 TABLET BY MOUTH 2 TIMES DAILY 60 tablet 5    Lancet Devices (SIMPLE DIAGNOSTICS LANCING DEV) MISC USE WITH LANCETS TO TEST BLOOD GLUCOSE 1 each 11    Blood Glucose Monitoring Suppl (TRUE METRIX METER) w/Device KIT USE TO TEST BLOOD GLUCOSE 1 kit 0    TRUE METRIX BLOOD GLUCOSE TEST strip USE TO TEST BLOOD GLUCOSE THREE TIMES DAILY 250 each 5    Pharmacist Choice Lancets MISC USE TO TEST BLOOD GLUCOSE THREE TIMES DAILY 300 each 5    UltiCare Alcohol Swabs 70 % PADS USE TO TEST BLOOD GLUCOSE THREE TIMES DAILY 100 each 5    UNIFINE PENTIPS 31G X 8 MM MISC USE WITH insulin pens 100 each 5    BASAGLAR KWIKPEN 100 UNIT/ML injection pen INJECT 60 UNITS INTO THE SKIN 2 TIMES DAILY 15 mL 5    budesonide-formoterol (SYMBICORT) 160-4.5 MCG/ACT AERO Inhale 2 puffs into the lungs daily 2 Inhaler 5    albuterol sulfate HFA (VENTOLIN HFA) 108 (90 Base) MCG/ACT inhaler Inhale 2 puffs into the lungs every 4 hours as needed for Wheezing or Shortness of Breath 3 Inhaler 5    ranolazine (RANEXA) 1000 MG extended release tablet Take 1 tablet by mouth 2 times daily 60 tablet 8    clopidogrel (PLAVIX) 75 MG tablet TAKE 1 TABLET BY MOUTH DA JULIEN 90 tablet 5    metoprolol succinate (TOPROL XL) 50 MG extended release tablet Take 0.5 tablets by mouth 2 times daily (with meals) 30 tablet 5    amLODIPine (NORVASC) 5 MG tablet Take 1 tablet by mouth daily 90 tablet 5    atorvastatin (LIPITOR) 80 MG tablet Take 1 tablet by mouth daily (Patient taking differently: Take 80 mg by mouth nightly ) 90 tablet 3    nitroGLYCERIN (NITROSTAT) 0.4 MG SL tablet Place 1 tablet under the tongue every 5 minutes as needed for Chest pain up to max of 3 total doses. If no relief after 1 dose, call 911. 25 tablet 3       Allergies:  Patient has no known allergies. Social History:   TOBACCO:   reports that she quit smoking about 2 years ago. Her smoking use included cigarettes. She has a 17.50 pack-year smoking history. She has quit using smokeless tobacco.     ETOH:   reports no history of alcohol use. Family History:       Problem Relation Age of Onset    Diabetes Mother     Hypertension Mother     High Cholesterol Mother     Stroke Mother     Cancer Mother     No Known Problems Paternal Grandfather         lung issues        ROS: As stated in the HPI and the 12 point review of system is otherwise negative    PHYSICAL EXAM:  BP (!) 166/68   Pulse 62   Temp 97.6 °F (36.4 °C) (Oral)   Resp 18   Wt 128 lb 15.5 oz (58.5 kg)   SpO2 100%   BMI 25.19 kg/m²     No results for input(s): POCGLU in the last 72 hours.     General appearance: alert and interactive, no respiratory distress, asthenic body habitus  Eyes: Lids and conjunctiva normal, sclera anicteric, cornea clear, pupils equal and reactive to light,  Throat: Oral mucosa pink and moist, oropharynx patent and clear,  Neck: JVD is present, trachea central, no goiter,  Lungs: She is barrel chested, breath sounds symmetrically equal, bronchial in quality and diffusely shallow, no wheezes and no crackles  Heart: Pulse rhythm is regular, volume and rate are normal, S1 and S2 are normal, no murmurs  Abdomen: Distended, normal bowel sounds, soft, tympanic, no guarding and no rebound tenderness, no organs and no masses esophagitis [K21.9] 05/05/2017    COPD (chronic obstructive pulmonary disease) (HCC) [J44.9] 04/29/2015    Chronic diastolic CHF (congestive heart failure), NYHA class 2 ([facility] [I50.32] 04/28/2015    CAD (coronary artery disease) [I25.10] 12/03/2012    HTN (hypertension) [I10] 07/15/2011           1. , nitrate therapy, PRN analgesics, continue beta-blocker, antiplatelet and statin therapy, continuous cardiac monitoring, serial EKG and serial cardiac enzymes  2. Empiric intensive gastric acid suppression therapy, IV Pepcid, PPI. Start Zanaflex and GI cocktail  3. IV Lasix 80 mg x 1, low-sodium diet, heart rate and blood pressure control, continue oral antihypertensive meds, closely monitor I/O, body weight, serum electrolytes and creatinine  4. ADA diet, subcutaneous insulin regimen for blood sugar target 140-180  5. Inhaled bronchodilators, maintenance and PRN  6. The patient and / or the family were informed of the results of any tests, a time was given to answer questions, a plan was proposed and they agreed with plan. Thank you  [id number] Mark King MD for the opportunity to be involved in this patients care. If you have any questions or concerns please feel free to contact me at [phone].   Full Code    Jessica Pollock MD  7/5/2020

## 2020-07-05 NOTE — PROGRESS NOTES
4 Eyes Admission Assessment     I agree as the admission nurse that 2 RN's have performed a thorough Head to Toe Skin Assessment on the patient. ALL assessment sites listed below have been assessed on admission. Areas assessed by both nurses:   [x]   Head, Face, and Ears   [x]   Shoulders, Back, and Chest  [x]   Arms, Elbows, and Hands   [x]   Coccyx, Sacrum, and Ischum  [x]   Legs, Feet, and Heels         Does the Patient have Skin Breakdown?   No         Rakan Prevention initiated:  No   Wound Care Orders initiated:  No      [facility] nurse consulted for Pressure Injury (Stage 3,4, Unstageable, DTI, NWPT, and Complex wounds):  No      Nurse 1 eSignature: Electronically signed by Rudi Chavez RN on 7/5/20 at 3:04 AM EDT    **SHARE this note so that the co-signing nurse is able to place an eSignature**    Nurse 2 eSignature: Electronically signed by Michelle Abebe RN on 7/5/20 at 3:05 AM EDT

## 2020-07-05 NOTE — CONSULTS
[address] [facility]  1955    July 5, 2020    Reason for Consult: Chest Pain    CC: Chest Pain    HPI:  The patient is 62 y.o. female with a past medical history significant for fibromyalgia, type 2 DM, CKD stage 3 and CAD with prior PCI/stnts to her LAD, RCA and Circumflex. She has had recurrent admissions for chest pain in the past. She follows with Verline Spikes. Per her report, she was being considered for CABG when seen at [facility] and transferred to [facility]. She was to have surgery but this was canceled after her SVC and IVC were noted to be occluded. An attempt was apparently made to cross these lesions unsuccessfully. She states that she had relief of her chest pain for about three months. She did have occasional chest pain. The chest pain occurred yesterday about 2:30pm. She took nitroglycerin tablets for this and the pain got better but did not completely resolve. She was short of breath and nauseated. She did not vomit until today. She does have some chest pain today. Review of Systems:  Constitutional: No fatigue, weakness, night sweats or fever. HEENT: No new vision difficulties or ringing in the ears. Respiratory: No new SOB, PND, orthopnea or cough. Cardiovascular: See HPI   GI: No n/v, diarrhea, constipation, abdominal pain or changes in bowel habits. No melena, no hematochezia  : No urinary frequency, urgency, incontinence, hematuria or dysuria. Skin: No cyanosis or skin lesions. Musculoskeletal: No new muscle or joint pain. Neurological: No syncope or TIA-like symptoms.   Psychiatric: No anxiety, insomnia or depression     Past Medical History:   Diagnosis Date    Acid reflux     Anemia     Anxiety     Arthritis     Asthma     Atrial fibrillation (HCC)     Blood transfusion reaction     CAD (coronary artery disease) 12/3/2012    Cerebral artery occlusion with cerebral infarction (HCC)     [facility] failure) [facility] [address]     Chronic kidney disease     40% kidney functiom    COPD [facility]chronic obstructive pulmonary disease) [facility]     Depression     DM2 (diabetes mellitus, type 2) ([facility])     Dysarthria     Fibromyalgia 2016    Headache(784.0) 2013    Hemisensory loss     Hyperlipidemia     Hypertension     IBS (irritable bowel syndrome)     Inferior vena cava occlusion (HCC)     Irritable bowel syndrome     Keratitis     MI, old     Neuropathy     Superior vena cava obstruction     Temporal arteritis ([address] 2014     Past Surgical History:   Procedure Laterality Date    ABLATION OF DYSRHYTHMIC FOCUS      ARTERY BIOPSY Right 2014    RIGHT TEMPORAL ARTERY BIOPSY    CATARACT REMOVAL Bilateral     CHOLECYSTECTOMY      CORONARY ANGIOPLASTY WITH STENT PLACEMENT      CORONARY ANGIOPLASTY WITH STENT PLACEMENT      HYSTERECTOMY      JOINT REPLACEMENT Right     KNEE ARTHROSCOPY Right     KNEE SURGERY      right knee replacement    PTCA  10/2019    LAD and RCA inrtervention    TUNNELED VENOUS PORT PLACEMENT      left thigh.   SMART PORT    VASCULAR SURGERY       Family History   Problem Relation Age of Onset    Diabetes Mother     Hypertension Mother     High Cholesterol Mother     Stroke Mother     Cancer Mother     No Known Problems Paternal Grandfather         lung issues      Social History     Tobacco Use    Smoking status: Former Smoker     Packs/day: 0.50     Years: 35.00     Pack years: 17.50     Types: Cigarettes     Last attempt to quit: 2018     Years since quittin.0    Smokeless tobacco: Former User    Tobacco comment: 5/13/15 has not smoked since hospitalization -    Substance Use Topics    Alcohol use: No     Alcohol/week: 0.0 standard drinks    Drug use: No       No Known Allergies  Current Facility-Administered Medications   Medication Dose Route Frequency Provider Last Rate Last Dose    famotidine (PEPCID) injection 20 mg  20 mg Intravenous BID Haley Dumont MD   20 MD Emma   600 mg at 07/04/20 2357    hydrALAZINE (APRESOLINE) tablet 50 mg  50 mg Oral BID Griselda Collar, MD   50 mg at 07/05/20 1058    isosorbide mononitrate (IMDUR) extended release tablet 30 mg  30 mg Oral Daily Griselda Collar, MD   30 mg at 07/05/20 1059    metoprolol succinate (TOPROL XL) extended release tablet 25 mg  25 mg Oral BID WC Griselda Collar, MD   25 mg at 07/05/20 1059    torsemide (DEMADEX) tablet 20 mg  20 mg Oral Daily Griselda Collar, MD   20 mg at 07/05/20 1058    ranolazine (RANEXA) extended release tablet 1,000 mg  1,000 mg Oral BID Griselda Collar, MD   1,000 mg at 07/05/20 1058    enoxaparin (LOVENOX) injection 30 mg  30 mg Subcutaneous Daily Griselda Collar, MD   30 mg at 07/05/20 1058       Physical Exam:   BP (!) 177/70   Pulse 56   Temp 97.8 °F (36.6 °C) (Oral)   Resp 12   Wt 128 lb 15.5 oz (58.5 kg)   SpO2 100%   BMI 25.19 kg/m²   No intake or output data in the 24 hours ending 07/05/20 1146  Wt Readings from Last 2 Encounters:   07/05/20 128 lb 15.5 oz (58.5 kg)   06/05/20 123 lb 14.4 oz (56.2 kg)     Constitutional: She is oriented to person, place, and time. She appears well-developed and well-nourished. In no acute distress. Head: Normocephalic and atraumatic. Neck: Neck supple. No JVD present. Carotid bruit is not present. No mass and no thyromegaly present. No lymphadenopathy present. Cardiovascular: Normal rate, regular rhythm, normal heart sounds and intact distal pulses. Exam reveals no gallop and no friction rub. No murmur heard. Pulmonary/Chest: Effort normal and breath sounds normal. No respiratory distress. She has no wheezes, rhonchi or rales. Abdominal: Soft, non-tender. Bowel sounds and aorta are normal. She exhibits no organomegaly, mass or bruit. Extremities: No edema, cyanosis, or clubbing. Pulses are 2+ radial/carotid/dorsalis pedis and posterior tibial bilaterally. Neurological: She is alert and oriented to person, place, and time.  She has normal reflexes. No cranial nerve deficit. Coordination normal.   Skin: Skin is warm and dry. There is no rash or diaphoresis. Psychiatric: She has a normal mood and affect. Her speech is normal and behavior is normal.     EKG Interpretation:     Lab Review:   Lab Results   Component Value Date    TRIG 131 08/29/2019    HDL 70 08/29/2019    HDL 58 05/14/2012    LDLCALC 89 08/29/2019    LABVLDL 26 08/29/2019     Lab Results   Component Value Date     07/05/2020    K 3.4 07/05/2020    BUN 22 07/05/2020    CREATININE 1.3 07/05/2020     Recent Labs     07/04/20  1535 07/05/20  0551   WBC 5.3 5.0   HGB 9.9* 11.7*   HCT 29.9* 35.5*    183     Stress Perfusion 12/12/19:  No significant abnormalitiy    Cath:     Diagnostics:   [facility]   Left Ventricle: EF=65%, Wall motion: mild inferior HK. LVEDP=6 mmHg. No aortic valve gradient seen.  Severe coronary calcification, multiple stents all 3 arteries with double layer stents ramus and mLAD     Coronary angiogram:  Left Main Trunk (LMT): wide, ectatic, 10% stenosis  Left Anterior Descending (LAD): patent proximal stent, patent mid double layer stents with 10-20% ISR, patent distal stent  Left Circumflex (LCX): small caliber, patent distal stent, diffuse 50-60% mid stenosis  R[place] (FRANCIA): large caliber, double layer of stents prox-mid with focal 50-60% ISR, diffuse 20% ISR  Right Coronary (RCA): large, dominant, ectatic, patent stent prox-mid RCA, patent distal stent, diffuse 40% mid stenosis, 40% proximal rPDA stenosis     FFR ramus - resting ratio 0.97, dropped to 0.93 with IV adenosine (negative ischemic response)     FFR LCX - resting ratio 0.98, dropped to 0.95 with IV adenosine (negative ischemic response)         Cath 10/2/19 ([facility]):  3 vessel CAD  Normal LV function  Normal LVEDP    Intervention:   no    Plan: Review at Heart Team meeting    Findings:    Dominance: right dominant    LM:  Normal       LAD:  Proximal: 40%   Distal: 80%, distal to stent 70%, in-stent       LCX:  Mid: 50%     OM1: 40%, in-stent       RCA:  Proximal: patent stent     Mid: 50%        LV: EF: 60%    Wall motion: Normal    LVEDP: 9 mmHg (Normal)  Aortic valve pullback gradient: no      PCI 10/11/19 [facility]):  1. 3 v CAD; widely patent supple previous stents except for the LAD.  The     patient is presenting for planned multivessel PCI; the culprit lesion is     the distal LAD de andrew stenosis    2. Successful PCI of the mid LAD ISR and distal LAD de andrew stenosis with     non-overlapping Synergy drug-eluting stent    3. Successful PCI of the crux of the RCA using Synergy drug-eluting stent    4. Normal systemic pressure    5. Sheath secured into position         RECOMMENDATION:    1. Dual antiplatelet therapy in the form of aspirin 81 mg daily for     lifetime and Plavix 75 mg daily for at least 1 year        Assessment:  1. Chest Pain, atypical  2. Essential hypertension  3. CKD, stage 3  4. Hyperlipidemia with goal LDL<70mg/dL  5. Nausea and Vomiting    Plan:  Coral's chest pain is noncardiac in nature. She has no acute ischemic ECG changes, her troponin assays are negative and she had minimal to no response to nitrates. I don't believe that CABG in 10/2019 would have relieved her chest pain. I would not pursue any further work-up for this chest pain from a cardiac standpoint. I discssed this with her at length and she seemed to accept it. Dr. Eva Elena can evaluate her tomorrow as well. Nosider other causes for the chest pain such as her fibromyalgia. She has apparently had a GI work-up by Dr. Ephraim De Los Santos in the past. Consider a gastric emptying scan with her nausea and poorly controlled DM.

## 2020-07-05 NOTE — ED NOTES
Transfer Center calls with new room #1823                                                     Report # [id number]  [employer] any time     Hieu Narayan RN  07/04/20 6804

## 2020-07-06 ENCOUNTER — ANESTHESIA EVENT (OUTPATIENT)
Dept: ENDOSCOPY | Age: 65
DRG: 313 | End: 2020-07-06
Payer: COMMERCIAL

## 2020-07-06 ENCOUNTER — ANESTHESIA (OUTPATIENT)
Dept: ENDOSCOPY | Age: 65
DRG: 313 | End: 2020-07-06
Payer: COMMERCIAL

## 2020-07-06 VITALS
DIASTOLIC BLOOD PRESSURE: 42 MMHG | SYSTOLIC BLOOD PRESSURE: 90 MMHG | OXYGEN SATURATION: 100 % | RESPIRATION RATE: 13 BRPM

## 2020-07-06 LAB
ANION GAP SERPL CALCULATED.3IONS-SCNC: 11 MMOL/L (ref 3–16)
BASOPHILS ABSOLUTE: 0 K/UL (ref 0–0.2)
BASOPHILS RELATIVE PERCENT: 0.5 %
BUN BLDV-MCNC: 25 MG/DL (ref 7–20)
CALCIUM SERPL-MCNC: 8.9 MG/DL (ref 8.3–10.6)
CHLORIDE BLD-SCNC: 101 MMOL/L (ref 99–110)
CO2: 28 MMOL/L (ref 21–32)
CREAT SERPL-MCNC: 1.5 MG/DL (ref 0.6–1.2)
EKG ATRIAL RATE: 86 BPM
EKG DIAGNOSIS: NORMAL
EKG P AXIS: 72 DEGREES
EKG P-R INTERVAL: 132 MS
EKG Q-T INTERVAL: 374 MS
EKG QRS DURATION: 78 MS
EKG QTC CALCULATION (BAZETT): 447 MS
EKG R AXIS: 48 DEGREES
EKG T AXIS: 81 DEGREES
EKG VENTRICULAR RATE: 86 BPM
EOSINOPHILS ABSOLUTE: 0.1 K/UL (ref 0–0.6)
EOSINOPHILS RELATIVE PERCENT: 2.1 %
GFR AFRICAN AMERICAN: 42
GFR NON-AFRICAN AMERICAN: 35
GLUCOSE BLD-MCNC: 198 MG/DL (ref 70–99)
GLUCOSE BLD-MCNC: 228 MG/DL (ref 70–99)
HCT VFR BLD CALC: 32.1 % (ref 36–48)
HEMOGLOBIN: 10.6 G/DL (ref 12–16)
LYMPHOCYTES ABSOLUTE: 1.2 K/UL (ref 1–5.1)
LYMPHOCYTES RELATIVE PERCENT: 27 %
MCH RBC QN AUTO: 32.2 PG (ref 26–34)
MCHC RBC AUTO-ENTMCNC: 33.1 G/DL (ref 31–36)
MCV RBC AUTO: 97.3 FL (ref 80–100)
MONOCYTES ABSOLUTE: 0.3 K/UL (ref 0–1.3)
MONOCYTES RELATIVE PERCENT: 5.5 %
NEUTROPHILS ABSOLUTE: 3 K/UL (ref 1.7–7.7)
NEUTROPHILS RELATIVE PERCENT: 64.9 %
PDW BLD-RTO: 15.1 % (ref 12.4–15.4)
PERFORMED ON: ABNORMAL
PLATELET # BLD: 186 K/UL (ref 135–450)
PMV BLD AUTO: 8.4 FL (ref 5–10.5)
POTASSIUM REFLEX MAGNESIUM: 4.2 MMOL/L (ref 3.5–5.1)
RBC # BLD: 3.3 M/UL (ref 4–5.2)
SARS-COV-2, NAAT: NOT DETECTED
SODIUM BLD-SCNC: 140 MMOL/L (ref 136–145)
WBC # BLD: 4.6 K/UL (ref 4–11)

## 2020-07-06 PROCEDURE — 3700000000 HC ANESTHESIA ATTENDED CARE: Performed by: INTERNAL MEDICINE

## 2020-07-06 PROCEDURE — 2580000003 HC RX 258: Performed by: INTERNAL MEDICINE

## 2020-07-06 PROCEDURE — 6370000000 HC RX 637 (ALT 250 FOR IP): Performed by: NURSE PRACTITIONER

## 2020-07-06 PROCEDURE — 94760 N-INVAS EAR/PLS OXIMETRY 1: CPT

## 2020-07-06 PROCEDURE — 6370000000 HC RX 637 (ALT 250 FOR IP): Performed by: FAMILY MEDICINE

## 2020-07-06 PROCEDURE — 6360000002 HC RX W HCPCS

## 2020-07-06 PROCEDURE — 6370000000 HC RX 637 (ALT 250 FOR IP): Performed by: INTERNAL MEDICINE

## 2020-07-06 PROCEDURE — G0378 HOSPITAL OBSERVATION PER HR: HCPCS

## 2020-07-06 PROCEDURE — 99231 SBSQ HOSP IP/OBS SF/LOW 25: CPT | Performed by: INTERNAL MEDICINE

## 2020-07-06 PROCEDURE — 80048 BASIC METABOLIC PNL TOTAL CA: CPT

## 2020-07-06 PROCEDURE — 2709999900 HC NON-CHARGEABLE SUPPLY: Performed by: INTERNAL MEDICINE

## 2020-07-06 PROCEDURE — 2500000003 HC RX 250 WO HCPCS

## 2020-07-06 PROCEDURE — 93010 ELECTROCARDIOGRAM REPORT: CPT | Performed by: INTERNAL MEDICINE

## 2020-07-06 PROCEDURE — 2580000003 HC RX 258: Performed by: FAMILY MEDICINE

## 2020-07-06 PROCEDURE — 1200000000 HC SEMI PRIVATE

## 2020-07-06 PROCEDURE — 2500000003 HC RX 250 WO HCPCS: Performed by: INTERNAL MEDICINE

## 2020-07-06 PROCEDURE — 6360000002 HC RX W HCPCS: Performed by: INTERNAL MEDICINE

## 2020-07-06 PROCEDURE — 94640 AIRWAY INHALATION TREATMENT: CPT

## 2020-07-06 PROCEDURE — 7100000000 HC PACU RECOVERY - FIRST 15 MIN: Performed by: INTERNAL MEDICINE

## 2020-07-06 PROCEDURE — U0003 INFECTIOUS AGENT DETECTION BY NUCLEIC ACID (DNA OR RNA); SEVERE ACUTE RESPIRATORY SYNDROME CORONAVIRUS 2 (SARS-COV-2) (CORONAVIRUS DISEASE [COVID-19]), AMPLIFIED PROBE TECHNIQUE, MAKING USE OF HIGH THROUGHPUT TECHNOLOGIES AS DESCRIBED BY CMS-2020-01-R: HCPCS

## 2020-07-06 PROCEDURE — 2580000003 HC RX 258

## 2020-07-06 PROCEDURE — U0002 COVID-19 LAB TEST NON-CDC: HCPCS

## 2020-07-06 PROCEDURE — 3700000001 HC ADD 15 MINUTES (ANESTHESIA): Performed by: INTERNAL MEDICINE

## 2020-07-06 PROCEDURE — 85025 COMPLETE CBC W/AUTO DIFF WBC: CPT

## 2020-07-06 PROCEDURE — 0DJ08ZZ INSPECTION OF UPPER INTESTINAL TRACT, VIA NATURAL OR ARTIFICIAL OPENING ENDOSCOPIC: ICD-10-PCS | Performed by: INTERNAL MEDICINE

## 2020-07-06 PROCEDURE — 6360000002 HC RX W HCPCS: Performed by: FAMILY MEDICINE

## 2020-07-06 PROCEDURE — 3609017100 HC EGD: Performed by: INTERNAL MEDICINE

## 2020-07-06 RX ORDER — LABETALOL HYDROCHLORIDE 5 MG/ML
5 INJECTION, SOLUTION INTRAVENOUS EVERY 10 MIN PRN
Status: DISCONTINUED | OUTPATIENT
Start: 2020-07-06 | End: 2020-07-06

## 2020-07-06 RX ORDER — PROPOFOL 10 MG/ML
INJECTION, EMULSION INTRAVENOUS PRN
Status: DISCONTINUED | OUTPATIENT
Start: 2020-07-06 | End: 2020-07-06 | Stop reason: SDUPTHER

## 2020-07-06 RX ORDER — PROMETHAZINE HYDROCHLORIDE 25 MG/ML
6.25 INJECTION, SOLUTION INTRAMUSCULAR; INTRAVENOUS
Status: DISCONTINUED | OUTPATIENT
Start: 2020-07-06 | End: 2020-07-06

## 2020-07-06 RX ORDER — PROPOFOL 10 MG/ML
INJECTION, EMULSION INTRAVENOUS CONTINUOUS PRN
Status: DISCONTINUED | OUTPATIENT
Start: 2020-07-06 | End: 2020-07-06 | Stop reason: SDUPTHER

## 2020-07-06 RX ORDER — LIDOCAINE HYDROCHLORIDE 20 MG/ML
INJECTION, SOLUTION EPIDURAL; INFILTRATION; INTRACAUDAL; PERINEURAL PRN
Status: DISCONTINUED | OUTPATIENT
Start: 2020-07-06 | End: 2020-07-06 | Stop reason: SDUPTHER

## 2020-07-06 RX ORDER — QUETIAPINE FUMARATE 25 MG/1
25 TABLET, FILM COATED ORAL NIGHTLY
Status: DISCONTINUED | OUTPATIENT
Start: 2020-07-06 | End: 2020-07-07 | Stop reason: HOSPADM

## 2020-07-06 RX ORDER — OMEPRAZOLE 20 MG/1
20 CAPSULE, DELAYED RELEASE ORAL DAILY
Qty: 30 CAPSULE | Refills: 1 | Status: SHIPPED
Start: 2020-07-06 | End: 2020-07-06 | Stop reason: HOSPADM

## 2020-07-06 RX ORDER — SODIUM CHLORIDE 9 MG/ML
INJECTION, SOLUTION INTRAVENOUS CONTINUOUS PRN
Status: DISCONTINUED | OUTPATIENT
Start: 2020-07-06 | End: 2020-07-06 | Stop reason: SDUPTHER

## 2020-07-06 RX ORDER — ONDANSETRON 2 MG/ML
4 INJECTION INTRAMUSCULAR; INTRAVENOUS
Status: DISCONTINUED | OUTPATIENT
Start: 2020-07-06 | End: 2020-07-06

## 2020-07-06 RX ORDER — PANTOPRAZOLE SODIUM 40 MG/1
40 TABLET, DELAYED RELEASE ORAL
Qty: 30 TABLET | Refills: 3 | Status: SHIPPED | OUTPATIENT
Start: 2020-07-07 | End: 2020-12-22

## 2020-07-06 RX ADMIN — ASPIRIN 81 MG: 81 TABLET, COATED ORAL at 09:13

## 2020-07-06 RX ADMIN — QUETIAPINE FUMARATE 25 MG: 25 TABLET ORAL at 23:39

## 2020-07-06 RX ADMIN — SODIUM CHLORIDE, PRESERVATIVE FREE 10 ML: 5 INJECTION INTRAVENOUS at 08:25

## 2020-07-06 RX ADMIN — LIDOCAINE HYDROCHLORIDE 80 MG: 20 INJECTION, SOLUTION EPIDURAL; INFILTRATION; INTRACAUDAL; PERINEURAL at 12:00

## 2020-07-06 RX ADMIN — AMLODIPINE BESYLATE 5 MG: 5 TABLET ORAL at 09:14

## 2020-07-06 RX ADMIN — DIVALPROEX SODIUM 250 MG: 250 TABLET, EXTENDED RELEASE ORAL at 23:08

## 2020-07-06 RX ADMIN — RANOLAZINE 1000 MG: 500 TABLET, FILM COATED, EXTENDED RELEASE ORAL at 23:06

## 2020-07-06 RX ADMIN — SODIUM CHLORIDE: 9 INJECTION, SOLUTION INTRAVENOUS at 11:31

## 2020-07-06 RX ADMIN — MORPHINE SULFATE 1 MG: 2 INJECTION, SOLUTION INTRAMUSCULAR; INTRAVENOUS at 14:34

## 2020-07-06 RX ADMIN — TORSEMIDE 20 MG: 20 TABLET ORAL at 09:14

## 2020-07-06 RX ADMIN — CLOPIDOGREL BISULFATE 75 MG: 75 TABLET ORAL at 16:13

## 2020-07-06 RX ADMIN — BUDESONIDE AND FORMOTEROL FUMARATE DIHYDRATE 2 PUFF: 160; 4.5 AEROSOL RESPIRATORY (INHALATION) at 08:13

## 2020-07-06 RX ADMIN — ATORVASTATIN CALCIUM 80 MG: 80 TABLET, FILM COATED ORAL at 23:07

## 2020-07-06 RX ADMIN — PROPOFOL 80 MG: 10 INJECTION, EMULSION INTRAVENOUS at 12:00

## 2020-07-06 RX ADMIN — Medication 10 ML: at 14:34

## 2020-07-06 RX ADMIN — METOPROLOL SUCCINATE 25 MG: 25 TABLET, EXTENDED RELEASE ORAL at 16:13

## 2020-07-06 RX ADMIN — HYDROCODONE BITARTRATE AND ACETAMINOPHEN 1 TABLET: 5; 325 TABLET ORAL at 08:25

## 2020-07-06 RX ADMIN — DIVALPROEX SODIUM 250 MG: 250 TABLET, EXTENDED RELEASE ORAL at 08:25

## 2020-07-06 RX ADMIN — ONDANSETRON 4 MG: 2 INJECTION INTRAMUSCULAR; INTRAVENOUS at 08:24

## 2020-07-06 RX ADMIN — DIVALPROEX SODIUM 250 MG: 250 TABLET, EXTENDED RELEASE ORAL at 09:12

## 2020-07-06 RX ADMIN — METOPROLOL SUCCINATE 25 MG: 25 TABLET, EXTENDED RELEASE ORAL at 09:14

## 2020-07-06 RX ADMIN — GABAPENTIN 600 MG: 300 CAPSULE ORAL at 23:07

## 2020-07-06 RX ADMIN — RANOLAZINE 1000 MG: 500 TABLET, FILM COATED, EXTENDED RELEASE ORAL at 09:15

## 2020-07-06 RX ADMIN — GABAPENTIN 300 MG: 300 CAPSULE ORAL at 09:12

## 2020-07-06 RX ADMIN — ISOSORBIDE MONONITRATE 30 MG: 30 TABLET, EXTENDED RELEASE ORAL at 09:13

## 2020-07-06 RX ADMIN — DULOXETINE HYDROCHLORIDE 60 MG: 60 CAPSULE, DELAYED RELEASE ORAL at 09:14

## 2020-07-06 RX ADMIN — HYDRALAZINE HYDROCHLORIDE 50 MG: 50 TABLET, FILM COATED ORAL at 23:07

## 2020-07-06 RX ADMIN — MORPHINE SULFATE 1 MG: 2 INJECTION, SOLUTION INTRAMUSCULAR; INTRAVENOUS at 18:18

## 2020-07-06 RX ADMIN — ONDANSETRON 4 MG: 2 INJECTION INTRAMUSCULAR; INTRAVENOUS at 14:34

## 2020-07-06 RX ADMIN — SODIUM CHLORIDE, PRESERVATIVE FREE 10 ML: 5 INJECTION INTRAVENOUS at 23:08

## 2020-07-06 RX ADMIN — PROPOFOL 160 MCG/KG/MIN: 10 INJECTION, EMULSION INTRAVENOUS at 12:00

## 2020-07-06 RX ADMIN — HYDROCODONE BITARTRATE AND ACETAMINOPHEN 1 TABLET: 5; 325 TABLET ORAL at 16:13

## 2020-07-06 RX ADMIN — HYDRALAZINE HYDROCHLORIDE 50 MG: 50 TABLET, FILM COATED ORAL at 09:15

## 2020-07-06 RX ADMIN — PANTOPRAZOLE SODIUM 40 MG: 40 TABLET, DELAYED RELEASE ORAL at 09:14

## 2020-07-06 RX ADMIN — FAMOTIDINE 20 MG: 10 INJECTION, SOLUTION INTRAVENOUS at 08:24

## 2020-07-06 ASSESSMENT — PULMONARY FUNCTION TESTS
PIF_VALUE: 0

## 2020-07-06 ASSESSMENT — PAIN DESCRIPTION - LOCATION
LOCATION: CHEST

## 2020-07-06 ASSESSMENT — PAIN SCALES - GENERAL
PAINLEVEL_OUTOF10: 6
PAINLEVEL_OUTOF10: 0
PAINLEVEL_OUTOF10: 8
PAINLEVEL_OUTOF10: 8
PAINLEVEL_OUTOF10: 0
PAINLEVEL_OUTOF10: 0
PAINLEVEL_OUTOF10: 7
PAINLEVEL_OUTOF10: 8
PAINLEVEL_OUTOF10: 6
PAINLEVEL_OUTOF10: 8
PAINLEVEL_OUTOF10: 0

## 2020-07-06 ASSESSMENT — PAIN - FUNCTIONAL ASSESSMENT
PAIN_FUNCTIONAL_ASSESSMENT: 0-10
PAIN_FUNCTIONAL_ASSESSMENT: PREVENTS OR INTERFERES SOME ACTIVE ACTIVITIES AND ADLS

## 2020-07-06 ASSESSMENT — PAIN DESCRIPTION - PROGRESSION
CLINICAL_PROGRESSION: GRADUALLY WORSENING
CLINICAL_PROGRESSION: GRADUALLY IMPROVING
CLINICAL_PROGRESSION: GRADUALLY IMPROVING
CLINICAL_PROGRESSION: GRADUALLY WORSENING
CLINICAL_PROGRESSION: GRADUALLY WORSENING
CLINICAL_PROGRESSION: GRADUALLY IMPROVING
CLINICAL_PROGRESSION: GRADUALLY WORSENING

## 2020-07-06 ASSESSMENT — PAIN DESCRIPTION - DESCRIPTORS
DESCRIPTORS: ACHING;SQUEEZING
DESCRIPTORS: ACHING

## 2020-07-06 ASSESSMENT — PAIN DESCRIPTION - ONSET
ONSET: ON-GOING

## 2020-07-06 ASSESSMENT — PAIN DESCRIPTION - ORIENTATION
ORIENTATION: LEFT

## 2020-07-06 ASSESSMENT — PAIN DESCRIPTION - FREQUENCY
FREQUENCY: CONTINUOUS
FREQUENCY: INTERMITTENT
FREQUENCY: CONTINUOUS
FREQUENCY: INTERMITTENT

## 2020-07-06 ASSESSMENT — PAIN DESCRIPTION - PAIN TYPE
TYPE: ACUTE PAIN

## 2020-07-06 ASSESSMENT — ENCOUNTER SYMPTOMS: SHORTNESS OF BREATH: 1

## 2020-07-06 ASSESSMENT — COPD QUESTIONNAIRES: CAT_SEVERITY: MODERATE

## 2020-07-06 NOTE — OP NOTE
Endoscopy Note    Patient: Adrian Nunez  : 1955  Acct#:     Procedure: Esophagogastroduodenoscopy                          Date:  2020     Surgeon:   Gomez Ambrocio MD    Referring Physician:  Melvina Denton MD    Indications: 79-year-old female with a past medical history of coronary artery disease s/p extensive evaluation, intervention and PCI most recently at [facility] earlier this year, CHF, COPD, DM-II, CKD, atrial fibrillation, hypertension, hyperlipidemia, fibromyalgia who presented to [facility] [place] on 2020 with atyypical chest pain. Postoperative Diagnosis:    1. Diffuse gastric antral erythema, consistent with gastritis versus GAVE versus congestive hepatopathy. Biopsies were not obtained due to active Plavix use. Anesthesia:  MAC    Consent:  The patient or their legal guardian has signed an informed consent, and is aware of the potential risks, benefits, alternatives, and potential complications of this procedure. These include, but are not limited to hemorrhage, bleeding, post procedural pain, perforation, phlebitis, aspiration, hypotension, hypoxia, cardiovascular events such as arryhthmia, and possibly death. Description of Procedure: The patient was then taken to the endoscopy suite, placed in the left lateral decubitus position and the above IV sedation was administrered. The Olympus video endoscope was placed through the patient's oropharynx without difficulty to the extent of the 2nd portion of the duodenum. Both forward and retroflexed views of the stomach were obtained. Findings:    Esophagus: Prominent vasculature was present in the proximal and mid-esophagus, without evidence of distal esophageal varices. The esophagus otherwise appeared normal without evidence of Quiroga's esophagus or reflux esophagitis. The Z line was located 36 cm from the incisors. Stomach: The stomach was examined on forward and retroflexed views.  A moderate amount of residual food debris was present in the gastric lumen. Diffuse gastric antral erythema, consistent with gastritis versus GAVE versus congestive hepatopathy. Biopsies were not obtained due to active Plavix use. Duodenum: The first and 2nd portions of the duodenum were mildly atrophic in appearance, without evidence of erythema or ulceration. The scope was then withdrawn back into the stomach, it was decompressed, and the scope was completely withdrawn. The patient tolerated the procedure well and was taken to the post anesthesia care unit in good condition. Estimated Blood Loss: None    Impression:   1) See post procedure diagnoses    Recommendations:   1) Advance diet as tolerated  2) Recommend gastric emptying study as an inpatient versus as an outpatient following discharge. 3) Continue present medications. 4) Will obtain an H. Pylori stool Ag  5) Avoid NSAID's other than prescribed ASA for CAD. 6) Ok to discharge from a GI standpoint, will sign off.       HANY Wyatt [address] [facility]

## 2020-07-06 NOTE — PROGRESS NOTES
Patient in bed, she complained of nausea, and chest pain (8/10), given Zofran and Narco. Consent for EGD is in her chart, rapid coved test sent to lab at 0910. She remains NPO. She is alert and oriented x4, uses the call light appropriately when needing assistance.

## 2020-07-06 NOTE — ANESTHESIA POSTPROCEDURE EVALUATION
[facility] Department of Anesthesiology  Post-Anesthesia Note       Name:  Corie Aparicio                                  Age:  62 y.o. MRN:  [id number]     Last Vitals & Oxygen Saturation: BP (!) 102/50   Pulse 67   Temp 97 °F (36.1 °C)   Resp 13   Ht 5' (1.524 m)   Wt 128 lb (58.1 kg)   SpO2 100%   BMI 25.00 kg/m²   Patient Vitals for the past 4 hrs:   BP Temp Temp src Pulse Resp SpO2 Height Weight   07/06/20 1227 (!) 102/50 97 °F (36.1 °C) -- 67 13 100 % -- --   07/06/20 1220 (!) 93/49 -- -- 71 14 100 % -- --   07/06/20 1218 -- -- -- 74 17 100 % -- --   07/06/20 1217 (!) 88/46 97 °F (36.1 °C) Temporal 73 15 100 % -- --   07/06/20 1115 131/65 96.9 °F (36.1 °C) Temporal 63 17 99 % 5' (1.524 m) 128 lb (58.1 kg)       Level of consciousness:  Awake, alert    Respiratory: Respirations easy, no distress. Stable. Cardiovascular: Hemodynamically stable. Hydration: Adequate. PONV: Adequately managed. Post-op pain: Adequately controlled. Post-op assessment: Tolerated anesthetic well without complication. Complications:  None.     Glory Segovia MD  July 6, 2020   12:34 PM

## 2020-07-06 NOTE — PROGRESS NOTES
Pt resting in bed. A&Ox4. No complaints overnight. NPO since midnight. Call light and bedside table within reach.

## 2020-07-06 NOTE — CARE COORDINATION
[facility]    Received referral for homecare services. Currently active with [facility]. Will follow patient for homecare services at discharge. Should Pt DC over weekend, fax face sheet, order, RONNIE, and H&P to [facility].    Electronically signed by Kanika Paul LPN on 9/4/99 at 9:94 PM EDT          Kanika Paul LPN  [facility] [address] Transition Nurse  [phone]  '

## 2020-07-06 NOTE — CARE COORDINATION
Do to patient having been given meds last evening that are conrtaindicated for gastric emptying study, we will be doing her study on Wednesday July 8, 20. Nuclear Medicine Gatric Emptying Exam    Prep:  NPO 6 hrs prior  No stomach medicines for 2 day prior  No prokineticagents or Reglan 2 days prior  No opiates, bentyl or antispasmodic medications 2 days prior      *Phenergan and Zofran can be given    This is a 4 hour exam and the patient may not eat or drink anything during this period except water. If any questions please call Nuclear Medicine [phone]. Pt to consume ~75% of meal tray in the next 4 days Pt to consume ~75% of meal tray in the next 7 days

## 2020-07-06 NOTE — ANESTHESIA PRE PROCEDURE
[facility] Department of Anesthesiology  Pre-Anesthesia Evaluation/Consultation       Name:  Gali Mi  : 1955  Age:  62 y.o.                                            MRN:  [id number]  Date: 2020           Surgeon: Surgeon(s):  Jimenez Reynolds MD    Procedure: Procedure(s):  EGD DIAGNOSTIC ONLY     No Known Allergies  Patient Active Problem List   Diagnosis    HTN (hypertension)    Hyperlipidemia    CAD (coronary artery disease)    Palpitation    PVC (premature ventricular contraction)    Depression    Migraine with aura, intractable    Hemisensory loss    PTSD (post-traumatic stress disorder)    Insomnia    Snoring    Atypical chest pain    Dysarthria    Port-A-Cath in place    Inferior vena cava occlusion (HCC)    Cataract    Benign essential tremor    Tobacco use    Chronic diastolic CHF (congestive heart failure), NYHA class 2 ([address]    COPD (chronic obstructive pulmonary disease) ([address]    Pulmonary edema    NSTEMI (non-ST elevated myocardial infarction) ([address]    Rotator cuff tendonitis    Essential hypertension    Fibromyalgia    Chronic pain syndrome    Headache, chronic migraine without aura, intractable, with status    Type 2 diabetes mellitus with diabetic chronic kidney disease ([address]    S/P right coronary artery (RCA) stent placement    Irritable bowel syndrome    Giant cell arteritis ([address]    Gastro-esophageal reflux disease without esophagitis    A-fib (HCC)    Abscess of groin, right    Precordial chest pain    Coronary artery disease involving native coronary artery of native heart with angina pectoris ([address]    COPD exacerbation ([address]    DM (diabetes mellitus), type 2, uncontrolled ([address]    Right knee pain    Chronic painful diabetic neuropathy ([address]    CAD in native artery    Dyspnea on exertion    Unstable angina ([address]    Sepsis ([address]    Acute respiratory failure with hypoxia (HCC)    Generalized weakness    Reactive airway disease Hyperlipidemia     Hypertension     IBS (irritable bowel syndrome)     Inferior vena cava occlusion (HCC)     Irritable bowel syndrome     Keratitis     MI, old     Neuropathy     Superior vena cava obstruction     Temporal arteritis ([address] 2014     Past Surgical History:   Procedure Laterality Date    ABLATION OF DYSRHYTHMIC FOCUS      ARTERY BIOPSY Right 2014    RIGHT TEMPORAL ARTERY BIOPSY    CATARACT REMOVAL Bilateral     CHOLECYSTECTOMY      CORONARY ANGIOPLASTY WITH STENT PLACEMENT      CORONARY ANGIOPLASTY WITH STENT PLACEMENT      HYSTERECTOMY      JOINT REPLACEMENT Right     KNEE ARTHROSCOPY Right     KNEE SURGERY      right knee replacement    PTCA  10/2019    LAD and RCA inrtervention    TUNNELED VENOUS PORT PLACEMENT      left thigh. SMART PORT    VASCULAR SURGERY       Social History     Tobacco Use    Smoking status: Former Smoker     Packs/day: 0.50     Years: 35.00     Pack years: 17.50     Types: Cigarettes     Last attempt to quit: 2018     Years since quittin.0    Smokeless tobacco: Former User    Tobacco comment: 5/13/15 has not smoked since hospitalization - kh   Substance Use Topics    Alcohol use: No     Alcohol/week: 0.0 standard drinks    Drug use: No     Medications  No current facility-administered medications on file prior to encounter.       Current Outpatient Medications on File Prior to Encounter   Medication Sig Dispense Refill    aspirin (ASPIRIN LOW DOSE) 81 MG EC tablet Take 1 tablet by mouth daily 90 tablet 5    isosorbide mononitrate (IMDUR) 30 MG extended release tablet Take 1 tablet by mouth daily 90 tablet 5    omeprazole (PRILOSEC) 20 MG delayed release capsule TAKE 1 CAPSULE BY MOUTH DAILY 30 capsule 1    QUEtiapine (SEROQUEL) 25 MG tablet Take 1-2 tablets by mouth nightly 60 tablet 0    DULoxetine (CYMBALTA) 60 MG extended release capsule Take 1 capsule by mouth daily 30 capsule 0    oxyCODONE-acetaminophen (PERCOCET) 7.5-325 MG per tablet Take 1 tablet by mouth every 6 hours as needed for Pain (max 3/day) for up to 28 days. 84 tablet 0    methocarbamol (ROBAXIN) 500 MG tablet Take 1 tablet by mouth 2 times daily 60 tablet 0    divalproex (DEPAKOTE ER) 250 MG extended release tablet Take 1 tablet by mouth 2 times daily 60 tablet 0    rizatriptan (MAXALT) 10 MG tablet Take 1 tablet by mouth once as needed for Migraine May repeat in 2 hours if needed 30 tablet 0    butalbital-acetaminophen-caffeine (FIORICET, ESGIC) -40 MG per tablet Take 1 tablet by mouth every 6 hours as needed for Headaches 50 tablet 0    ondansetron (ZOFRAN) 4 MG tablet Take 1 tablet by mouth 3 times daily as needed for Nausea or Vomiting 30 tablet 0    gabapentin (NEURONTIN) 300 MG capsule take1 tab am, take 2 tabs pm (Patient taking differently: Take 300 mg by mouth every morning. take1 tab am, take 2 tabs pm) 90 capsule 0    Ubrogepant (UBRELVY) 50 MG TABS Talk 1 tablet as needed at start of headaches, can repeat in 24 hours 10 tablet 0    insulin aspart (NOVOLOG FLEXPEN) 100 UNIT/ML injection pen Inject 5-12 Units into the skin 3 times daily (before meals) Sugar < 150   Zero Unit  151-200   5 U   201- 250   8 U   251-300    12 U 7 pen 5    gabapentin (NEURONTIN) 600 MG tablet Take 600 mg by mouth nightly.       torsemide (DEMADEX) 10 MG tablet TAKE TWO TABLETS BY MOUTH DAILY 60 tablet 5    Nebulizers (COMPRESSOR/NEBULIZER) MISC 1 Device by Does not apply route 4 times daily as needed (SOB / Wheezing) 1 each 0    albuterol (PROVENTIL) (2.5 MG/3ML) 0.083% nebulizer solution Take 3 mLs by nebulization every 4 hours as needed for Wheezing 120 each 5    hydrALAZINE (APRESOLINE) 50 MG tablet TAKE 1 TABLET BY MOUTH 2 TIMES DAILY 60 tablet 5    Lancet Devices (SIMPLE DIAGNOSTICS LANCING DEV) MISC USE WITH LANCETS TO TEST BLOOD GLUCOSE 1 each 11    Blood Glucose Monitoring Suppl (TRUE METRIX METER) w/Device KIT USE TO TEST BLOOD GLUCOSE 1 kit 0  TRUE METRIX BLOOD GLUCOSE TEST strip USE TO TEST BLOOD GLUCOSE THREE TIMES DAILY 250 each 5    Pharmacist Choice Lancets MISC USE TO TEST BLOOD GLUCOSE THREE TIMES DAILY 300 each 5    UltiCare Alcohol Swabs 70 % PADS USE TO TEST BLOOD GLUCOSE THREE TIMES DAILY 100 each 5    UNIFINE PENTIPS 31G X 8 MM MISC USE WITH insulin pens 100 each 5    BASAGLAR KWIKPEN 100 UNIT/ML injection pen INJECT 60 UNITS INTO THE SKIN 2 TIMES DAILY 15 mL 5    budesonide-formoterol (SYMBICORT) 160-4.5 MCG/ACT AERO Inhale 2 puffs into the lungs daily 2 Inhaler 5    albuterol sulfate HFA (VENTOLIN HFA) 108 (90 Base) MCG/ACT inhaler Inhale 2 puffs into the lungs every 4 hours as needed for Wheezing or Shortness of Breath 3 Inhaler 5    ranolazine (RANEXA) 1000 MG extended release tablet Take 1 tablet by mouth 2 times daily 60 tablet 8    clopidogrel (PLAVIX) 75 MG tablet TAKE 1 TABLET BY MOUTH DA JULIEN 90 tablet 5    metoprolol succinate (TOPROL XL) 50 MG extended release tablet Take 0.5 tablets by mouth 2 times daily (with meals) 30 tablet 5    amLODIPine (NORVASC) 5 MG tablet Take 1 tablet by mouth daily 90 tablet 5    atorvastatin (LIPITOR) 80 MG tablet Take 1 tablet by mouth daily (Patient taking differently: Take 80 mg by mouth nightly ) 90 tablet 3    nitroGLYCERIN (NITROSTAT) 0.4 MG SL tablet Place 1 tablet under the tongue every 5 minutes as needed for Chest pain up to max of 3 total doses.  If no relief after 1 dose, call 911. 25 tablet 3     Current Facility-Administered Medications   Medication Dose Route Frequency Provider Last Rate Last Dose    pantoprazole (PROTONIX) tablet 40 mg  40 mg Oral QAM AC Bernhard Councilman, MD   40 mg at 07/06/20 0914    nitroGLYCERIN (NITROSTAT) SL tablet 0.4 mg  0.4 mg Sublingual Q5 Min PRN Bernhard Councilman, MD        HYDROcodone-acetaminophen [facility]) 5-325 MG per tablet 1 tablet  1 tablet Oral Q6H PRN Bernhard Councilman, MD   1 tablet at 07/06/20 0825    prochlorperazine (COMPAZINE) injection 10 mg  10 mg Intravenous Q6H PRN Мария Roberson MD   10 mg at 07/05/20 1819    morphine (PF) injection 1 mg  1 mg Intravenous Q4H PRN Мария Roberson MD   1 mg at 07/05/20 1819    enoxaparin (LOVENOX) injection 40 mg  40 mg Subcutaneous Daily Beatrice Arellano MD        sodium chloride flush 0.9 % injection 10 mL  10 mL Intravenous 2 times per day Beatrice Arellano MD   10 mL at 07/06/20 0825    sodium chloride flush 0.9 % injection 10 mL  10 mL Intravenous PRN Beatrice Arellano MD        acetaminophen (TYLENOL) tablet 650 mg  650 mg Oral Q6H PRN Beatrice Arellano MD        Or   Sabina Arroyo acetaminophen (TYLENOL) suppository 650 mg  650 mg Rectal Q6H PRN Beatrice Arellano MD        polyethylene glycol (GLYCOLAX) packet 17 g  17 g Oral Daily PRN Beatrice Arellano MD        promethazine (PHENERGAN) tablet 12.5 mg  12.5 mg Oral Q6H PRN Beatrice Arellano MD        Or    [facility]ron [facility]) injection 4 mg  4 mg Intravenous Q6H PRN Beatrice Arellano MD   4 mg at 07/06/20 0824    amLODIPine (NORVASC) tablet 5 mg  5 mg Oral Daily Beatrice Arellano MD   5 mg at 07/06/20 0914    aspirin EC tablet 81 mg  81 mg Oral Daily Beatrice Arellano MD   81 mg at 07/06/20 0913    atorvastatin (LIPITOR) tablet 80 mg  80 mg Oral Nightly Beatrice Arellano MD   80 mg at 07/05/20 2135    budesonide-formoterol (SYMBICORT) 160-4.5 MCG/ACT inhaler 2 puff  2 puff Inhalation Daily Beatrice Arellano MD   2 puff at 07/06/20 0813    clopidogrel (PLAVIX) tablet 75 mg  75 mg Oral Daily Beatrice Arellano MD   75 mg at 07/05/20 1059    divalproex (DEPAKOTE ER) extended release tablet 250 mg  250 mg Oral BID Beatrice Arellano MD   250 mg at 07/06/20 0912    DULoxetine (CYMBALTA) extended release capsule 60 mg  60 mg Oral Daily Beatrice Arellano MD   60 mg at 07/06/20 0914    gabapentin (NEURONTIN) capsule 300 mg  300 mg Oral QAM Beatrice Arellano MD   300 mg at 07/06/20 0912    gabapentin (NEURONTIN) capsule 600 mg  600 mg Oral Nightly Beatrice Arellano MD   600 mg at 07/05/20 2135    hydrALAZINE (APRESOLINE) tablet 50 mg 50 mg Oral BID Eduardo Lo MD   50 mg at 20 0915    isosorbide mononitrate (IMDUR) extended release tablet 30 mg  30 mg Oral Daily Eduardo Lo MD   30 mg at 20 0913    metoprolol succinate (TOPROL XL) extended release tablet 25 mg  25 mg Oral BID WC Eduardo Lo MD   25 mg at 20 0914    torsemide (DEMADEX) tablet 20 mg  20 mg Oral Daily Eduardo Lo MD   20 mg at 20 0914    ranolazine (RANEXA) extended release tablet 1,000 mg  1,000 mg Oral BID Eduardo Lo MD   1,000 mg at 20 0915     Vital Signs (Current)   Vitals:    20 1115   BP: 131/65   Pulse: 63   Resp: 17   Temp: 96.9 °F (36.1 °C)   SpO2: 99%     Vital Signs Statistics (for past 48 hrs)     Temp  Av.9 °F (36.6 °C)  Min: 96.9 °F (36.1 °C)   Min taken time: 20 1115  Max: 98.6 °F (37 °C)   Max taken time: 20 2359  Pulse  Av.6  Min: 54   Min taken time: 20 0810  Max: 68   Max taken time: 20 1515  Resp  Avg: 15.3  Min: 6   Min taken time: 20 1735  Max: 25   Max taken time: 20 0439  BP  Min: 122/49   Min taken time: 20 0810  Max: 194/78   Max taken time: 20 2230  MAP (mmHg)  Av.6  Min: 70   Min taken time: 20 1545  Max: 106   Max taken time: 20 1146  SpO2  Av.3 %  Min: 94 %   Min taken time: 20 1800  Max: 100 %   Max taken time: 20 0439    BP Readings from Last 3 Encounters:   20 131/65   20 124/61   20 (!) 144/65     BMI  Body mass index is 25 kg/m². Estimated body mass index is 25 kg/m² as calculated from the following:    Height as of this encounter: 5' (1.524 m). Weight as of this encounter: 128 lb (58.1 kg).     CBC   Lab Results   Component Value Date    WBC 5.0 2020    RBC 3.63 2020    HGB 11.7 2020    HCT 35.5 2020    MCV 97.7 2020    RDW 15.0 2020     2020     CMP    Lab Results   Component Value Date     2020    K 3.4 2020     07/05/2020    CO2 25 07/05/2020    BUN 22 07/05/2020    CREATININE 1.3 07/05/2020    GFRAA 50 07/05/2020    GFRAA 60 05/23/2013    AGRATIO 1.1 07/04/2020    LABGLOM 41 07/05/2020    GLUCOSE 225 07/05/2020    PROT 7.4 07/04/2020    PROT 8.1 02/15/2013    CALCIUM 9.1 07/05/2020    BILITOT <0.2 07/04/2020    ALKPHOS 121 07/04/2020    AST 25 07/04/2020    ALT 23 07/04/2020     BMP    Lab Results   Component Value Date     07/05/2020    K 3.4 07/05/2020     07/05/2020    CO2 25 07/05/2020    BUN 22 07/05/2020    CREATININE 1.3 07/05/2020    CALCIUM 9.1 07/05/2020    GFRAA 50 07/05/2020    GFRAA 60 05/23/2013    LABGLOM 41 07/05/2020    GLUCOSE 225 07/05/2020     POCGlucose  Recent Labs     07/04/20  1535 07/05/20  0551   GLUCOSE 310* 225*      Coags    Lab Results   Component Value Date    PROTIME 10.7 05/11/2020    PROTIME 10.3 12/15/2009    INR 0.92 05/11/2020    APTT 120.6 01/81/4847     HCG (If Applicable) No results found for: PREGTESTUR, PREGSERUM, HCG, HCGQUANT   ABGs   Lab Results   Component Value Date    PHART 7.367 07/28/2019    PO2ART 97.4 07/28/2019    ZCD3KZJ 29.6 07/28/2019    ENG4EDP 16.6 07/28/2019    BEART -7.4 07/28/2019    F5KBAMEE 98.3 07/28/2019      Type & Screen (If Applicable)  No results found for: LABABO, LABRH                         BMI: Wt Readings from Last 3 Encounters:       NPO Status:   Date of last liquid consumption: 07/06/20   Time of last liquid consumption: (with meds)   Date of last solid food consumption: 07/06/20      Time of last solid consumption: 0015       Anesthesia Evaluation  Patient summary reviewed no history of anesthetic complications:   Airway: Mallampati: III  TM distance: >3 FB   Neck ROM: full   Dental:    (+) edentulous      Pulmonary:normal exam    (+) COPD: moderate,  shortness of breath:  asthma:                            Cardiovascular:  Exercise tolerance: poor (<4 METS),   (+) hypertension:, angina: no interval change, past MI:, CAD:, CABG/stent (Stents, last 2019):, dysrhythmias (PVCs): atrial fibrillation, CHF (EF 70):,       ECG reviewed  Rhythm: irregular  Rate: normal  Echocardiogram reviewed         Beta Blocker:  Dose within 24 Hrs         Neuro/Psych:   (+) CVA (no residual symptoms per pt):, neuromuscular disease:, headaches:, psychiatric history:depression/anxiety             GI/Hepatic/Renal:   (+) GERD:, renal disease: CRI,           Endo/Other:    (+) DiabetesType II DM, using insulin, blood dyscrasia (plavix last dose Saturday): anticoagulation therapy:., .                 Abdominal:           Vascular: negative vascular ROS. Anesthesia Plan      MAC     ASA 4       Induction: intravenous. Anesthetic plan and risks discussed with patient. Plan discussed with CRNA. This pre-anesthesia assessment may be used as a history and physical.    DOS STAFF ADDENDUM:    Pt seen and examined, chart reviewed (including anesthesia, drug and allergy history). No interval changes to history and physical examination. Anesthetic plan, risks, benefits, alternatives, and personnel involved discussed with patient. Questions and concerns addressed. Patient(family) verbalized an understanding and agrees to proceed.       Matthew James MD  July 6, 2020  11:40 AM

## 2020-07-06 NOTE — DISCHARGE INSTR - COC
Continuity of Care Form    Patient Name: Melody Issa   :  1955  MRN:  [id number]    Admit date:  2020  Discharge date:  2020    Code Status Order: Full Code   Advance Directives:   Advance Care Flowsheet Documentation     Date/Time Healthcare Directive Type of Healthcare Directive Copy in [address] Agent's Name Healthcare Agent's Phone Number    20 1118  Yes, patient has an advance directive for healthcare treatment  --  Yes, copy in chart  --  --  --    20 0956  Yes, patient has an advance directive for healthcare treatment  Durable power of  for health care;Living will  Yes, copy in chart  Adult Children  Stevie Mcdonald  [phone]    20 2213  Yes, patient has an advance directive for healthcare treatment  Durable power of  for health care;Living will  Yes, copy in chart  --  --  --          Admitting Physician: Joel Richardson MD  PCP: Kimberley Schlatter, MD    Discharging [facility] L8A-907/65-65  Discharging Unit Phone Number: [phone]    Emergency Contact:   Extended Emergency Contact Information  Primary Emergency Contact: Mirta Humphrey [address] Phone: [phone]  Relation: Child  Secondary Emergency Contact: Rosie Braxton  Address: [address] Phone: [phone]  Relation: Child    Past Surgical History:  Past Surgical History:   Procedure Laterality Date    ABLATION OF DYSRHYTHMIC FOCUS      ARTERY BIOPSY Right 2014    RIGHT TEMPORAL ARTERY BIOPSY    CATARACT REMOVAL Bilateral     CHOLECYSTECTOMY      CORONARY ANGIOPLASTY WITH STENT PLACEMENT      CORONARY ANGIOPLASTY WITH STENT PLACEMENT      HYSTERECTOMY      JOINT REPLACEMENT Right     KNEE ARTHROSCOPY Right     KNEE SURGERY      right knee replacement    PTCA  10/2019    LAD and RCA inrtervention    TUNNELED VENOUS PORT PLACEMENT      left thigh. SMART PORT    UPPER GASTROINTESTINAL ENDOSCOPY N/A 7/6/2020    EGD DIAGNOSTIC ONLY performed by Miguelangel Diallo MD at [address] [place] [address]         Immunization History:   Immunization History   Administered Date(s) Administered    Influenza Vaccine, unspecified formulation 11/17/2009, 10/08/2010    Influenza Virus Vaccine 10/08/2010, 12/11/2013, 09/30/2014    Influenza, Intradermal, Preservative free 10/04/2012    Influenza, Intradermal, Quadrivalent, Preservative Free 11/07/2017    Influenza, Quadv, IM, PF (6 mo and older Fluzone, Flulaval, Fluarix, and 3 yrs and older Afluria) 09/28/2016, 09/14/2018    Influenza, Triv, inactivated, subunit, adjuvanted, IM (Fluad 65 yrs and older) 09/24/2019    Pneumococcal Polysaccharide ([id number]) 12/12/2013    Tdap (Boostrix, Adacel) 11/17/2009, 10/28/2016       Active Problems:  Patient Active Problem List   Diagnosis Code    HTN (hypertension) I10    Hyperlipidemia E78.5    CAD (coronary artery disease) I25.10    Palpitation R00.2    PVC (premature ventricular contraction) I49.3    Depression F32.9    Migraine with aura, intractable G43.119    Hemisensory loss R20.0    PTSD (post-traumatic stress disorder) F43.10    Insomnia G47.00    Snoring R06.83    Atypical chest pain R07.89    Dysarthria R47.1    Port-A-Cath in place Z95.828    Inferior vena cava occlusion ([facility]) I82.220    Cataract H26.9    Benign essential tremor G25.0    Tobacco use Z72.0    Chronic diastolic CHF (congestive heart failure), NYHA class 2 ([facility]) I50.32    COPD (chronic obstructive pulmonary disease) ([facility]) J44.9    Pulmonary edema J81.1    NSTEMI (non-ST elevated myocardial infarction) ([facility]) I21.4    Rotator cuff tendonitis M75.80    Essential hypertension I10    Fibromyalgia M79.7    Chronic pain syndrome G89.4    Headache, chronic migraine without aura, intractable, with status G43.711    Type 2 diabetes mellitus with diabetic chronic kidney disease kidney disease) stage 3, GFR 30-59 ml/min ([facility]) N18.3    Compression of brain ([facility]) G93.5    Thrombosis of superior vena cava, chronic ([facility]) I82.211    Type 2 diabetes mellitus with mild nonproliferative diabetic retinopathy without macular edema, bilateral ([facility] J96.7464    Uncomplicated opioid dependence ([facility] F11.20    Chest pain R07.9    Acute chest pain R07.9    Acute onset aura migraine G43. 109    Angina at rest [facility]) I20.8       Isolation/Infection:   Isolation          No Isolation        Patient Infection Status     Infection Onset Added Last Indicated Last Indicated By Review Planned Expiration Resolved Resolved By    None active    Resolved    COVID-19 Rule Out 07/06/20 07/06/20 07/06/20 COVID-19 (Ordered)   07/06/20 Rule-Out Test Resulted          Nurse Assessment:  Last Vital Signs: BP (!) 110/50   Pulse 62   Temp 98.3 °F (36.8 °C) (Oral)   Resp 16   Ht 5' (1.524 m)   Wt 128 lb (58.1 kg)   SpO2 98%   BMI 25.00 kg/m²     Last documented pain score (0-10 scale): Pain Level: 8  Last Weight:   Wt Readings from Last 1 Encounters:   07/06/20 128 lb (58.1 kg)     Mental Status:  oriented, alert, coherent, logical, thought processes intact and able to concentrate and follow conversation    IV Access:  - None    Nursing Mobility/ADLs:  Walking   Independent  Transfer  Independent  Bathing  Independent  Dressing  Independent  Toileting  Independent  Feeding  Independent  Med [address]  Med Delivery   whole    Wound Care Documentation and Therapy:        Elimination:  Continence:   · Bowel: Yes and incontinent at times   · Bladder: Yes  Urinary Catheter: None   Colostomy/Ileostomy/Ileal Conduit: No       Date of Last BM: 7/4/2020    Intake/Output Summary (Last 24 hours) at 7/6/2020 1708  Last data filed at 7/6/2020 1220  Gross per 24 hour   Intake 210 ml   Output --   Net 210 ml     I/O last 3 completed shifts:   In: 210 [I.V.:210]  Out: -     Safety Concerns:     History of Seizures    Impairments/Disabilities:      None    Nutrition Therapy:  Current Nutrition Therapy:   - Oral Diet:  Carb Control 5 carbs/meal (2000kcals/day) cardiac     Routes of Feeding: Oral  Liquids: No Restrictions  Daily Fluid Restriction: no  Last Modified Barium Swallow with Video (Video Swallowing Test): not done    Treatments at the Time of Hospital Discharge:   Respiratory Treatments: none  Oxygen Therapy:  is not on home oxygen therapy. Ventilator:    - No ventilator support    Rehab Therapies: NURSE, PT, OT  Weight Bearing Status/Restrictions: No weight bearing restirctions  Other Medical Equipment (for information only, NOT a DME order):  none  Other Treatments: none    Patient's personal belongings (please select all that are sent with patient):  Glasses, Dentures upper and lower, Jewelry    RN SIGNATURE:  Electronically signed by Barbara Powers RN on 7/7/20 at 1:14 PM EDT    CASE MANAGEMENT/SOCIAL WORK SECTION    Inpatient Status Date: 7/4/2020    Readmission Risk Assessment Score:  Readmission Risk              Risk of Unplanned Readmission:        52           Discharging to Facility/ Agency   Name:  [facility] care    Address: [address] [place] [address], [address] [place] [address]  Phone: [phone]  Fax: [phone]    ·     / signature: Electronically signed by Silvio Toure on 7/7/20 at 12:28 PM EDT    PHYSICIAN SECTION    Prognosis: Good    Condition at Discharge: Stable    Rehab Potential (if transferring to Rehab): Good    Recommended Labs or Other Treatments After Discharge: Pt/OT    Physician Certification: I certify the above information and transfer of Adrian Nunez  is necessary for the continuing treatment of the diagnosis listed and that she requires Home Care for less 30 days.      Update Admission H&P: No change in H&P    PHYSICIAN SIGNATURE:  Electronically signed by Kelsey Cheek MD on 7/7/20 at 11:48 AM EDT

## 2020-07-06 NOTE — H&P
 isosorbide mononitrate (IMDUR) 30 MG extended release tablet Take 1 tablet by mouth daily 90 tablet 5    omeprazole (PRILOSEC) 20 MG delayed release capsule TAKE 1 CAPSULE BY MOUTH DAILY 30 capsule 1    QUEtiapine (SEROQUEL) 25 MG tablet Take 1-2 tablets by mouth nightly 60 tablet 0    DULoxetine (CYMBALTA) 60 MG extended release capsule Take 1 capsule by mouth daily 30 capsule 0    oxyCODONE-acetaminophen (PERCOCET) 7.5-325 MG per tablet Take 1 tablet by mouth every 6 hours as needed for Pain (max 3/day) for up to 28 days. 84 tablet 0    methocarbamol (ROBAXIN) 500 MG tablet Take 1 tablet by mouth 2 times daily 60 tablet 0    divalproex (DEPAKOTE ER) 250 MG extended release tablet Take 1 tablet by mouth 2 times daily 60 tablet 0    rizatriptan (MAXALT) 10 MG tablet Take 1 tablet by mouth once as needed for Migraine May repeat in 2 hours if needed 30 tablet 0    butalbital-acetaminophen-caffeine (FIORICET, ESGIC) -40 MG per tablet Take 1 tablet by mouth every 6 hours as needed for Headaches 50 tablet 0    ondansetron (ZOFRAN) 4 MG tablet Take 1 tablet by mouth 3 times daily as needed for Nausea or Vomiting 30 tablet 0    gabapentin (NEURONTIN) 300 MG capsule take1 tab am, take 2 tabs pm (Patient taking differently: Take 300 mg by mouth every morning. take1 tab am, take 2 tabs pm) 90 capsule 0    Ubrogepant (UBRELVY) 50 MG TABS Talk 1 tablet as needed at start of headaches, can repeat in 24 hours 10 tablet 0    insulin aspart (NOVOLOG FLEXPEN) 100 UNIT/ML injection pen Inject 5-12 Units into the skin 3 times daily (before meals) Sugar < 150   Zero Unit  151-200   5 U   201- 250   8 U   251-300    12 U 7 pen 5    gabapentin (NEURONTIN) 600 MG tablet Take 600 mg by mouth nightly.       torsemide (DEMADEX) 10 MG tablet TAKE TWO TABLETS BY MOUTH DAILY 60 tablet 5    Nebulizers (COMPRESSOR/NEBULIZER) MISC 1 Device by Does not apply route 4 times daily as needed (SOB / Wheezing) 1 each 0    albuterol (PROVENTIL) (2.5 MG/3ML) 0.083% nebulizer solution Take 3 mLs by nebulization every 4 hours as needed for Wheezing 120 each 5    hydrALAZINE (APRESOLINE) 50 MG tablet TAKE 1 TABLET BY MOUTH 2 TIMES DAILY 60 tablet 5    Lancet Devices (SIMPLE DIAGNOSTICS LANCING DEV) MISC USE WITH LANCETS TO TEST BLOOD GLUCOSE 1 each 11    Blood Glucose Monitoring Suppl (TRUE METRIX METER) w/Device KIT USE TO TEST BLOOD GLUCOSE 1 kit 0    TRUE METRIX BLOOD GLUCOSE TEST strip USE TO TEST BLOOD GLUCOSE THREE TIMES DAILY 250 each 5    Pharmacist Choice Lancets MISC USE TO TEST BLOOD GLUCOSE THREE TIMES DAILY 300 each 5    UltiCare Alcohol Swabs 70 % PADS USE TO TEST BLOOD GLUCOSE THREE TIMES DAILY 100 each 5    UNIFINE PENTIPS 31G X 8 MM MISC USE WITH insulin pens 100 each 5    BASAGLAR KWIKPEN 100 UNIT/ML injection pen INJECT 60 UNITS INTO THE SKIN 2 TIMES DAILY 15 mL 5    budesonide-formoterol (SYMBICORT) 160-4.5 MCG/ACT AERO Inhale 2 puffs into the lungs daily 2 Inhaler 5    albuterol sulfate HFA (VENTOLIN HFA) 108 (90 Base) MCG/ACT inhaler Inhale 2 puffs into the lungs every 4 hours as needed for Wheezing or Shortness of Breath 3 Inhaler 5    ranolazine (RANEXA) 1000 MG extended release tablet Take 1 tablet by mouth 2 times daily 60 tablet 8    clopidogrel (PLAVIX) 75 MG tablet TAKE 1 TABLET BY MOUTH DA JULIEN 90 tablet 5    metoprolol succinate (TOPROL XL) 50 MG extended release tablet Take 0.5 tablets by mouth 2 times daily (with meals) 30 tablet 5    amLODIPine (NORVASC) 5 MG tablet Take 1 tablet by mouth daily 90 tablet 5    atorvastatin (LIPITOR) 80 MG tablet Take 1 tablet by mouth daily (Patient taking differently: Take 80 mg by mouth nightly ) 90 tablet 3    nitroGLYCERIN (NITROSTAT) 0.4 MG SL tablet Place 1 tablet under the tongue every 5 minutes as needed for Chest pain up to max of 3 total doses.  If no relief after 1 dose, call 911. 48 tablet 3        Allergies:  Patient has no known allergies. Social History:   TOBACCO:   reports that she quit smoking about 2 years ago. Her smoking use included cigarettes. She has a 17.50 pack-year smoking history. She has quit using smokeless tobacco.  ETOH:   reports no history of alcohol use. DRUGS:   reports no history of drug use. PHYSICAL EXAM:      Vital Signs: /65   Pulse 63   Temp 96.9 °F (36.1 °C) (Temporal)   Resp 17   Ht 5' (1.524 m)   Wt 128 lb (58.1 kg)   SpO2 99%   BMI 25.00 kg/m²    Airway: No stridor or wheezing noted. Good air movement  Pulmonary: without wheezes. Clear to auscultation  Cardiac:regular rate and rhythm without loud murmurs  Abdomen:soft, nontender,  Bowel sounds present    Pre-Procedure Assessment / Plan:  1) EGD     ASA Grade:  ASA 3 - Patient with moderate systemic disease with functional limitations  Mallampati Classification:  Class II    Level of Sedation Plan:MAC    Post Procedure plan: Return to same level of care    I assessed the patient and find that the patient is in satisfactory condition to proceed with the planned procedure and sedation plan. I have explained the risk, benefits, and alternatives to the procedure; the patient understands and agrees to proceed.        Inocencio Baez MD  7/6/2020

## 2020-07-06 NOTE — PROGRESS NOTES
Progress note    Reviewed Dr. Latha aTm consult note from yesterday  Reviewed last invasive work-up at [facility]    The patient's chest pain is atypical  No cardiology work-up planned    Will sign off    Derik Cid M.D.

## 2020-07-07 VITALS
RESPIRATION RATE: 16 BRPM | TEMPERATURE: 97.7 F | BODY MASS INDEX: 25.23 KG/M2 | SYSTOLIC BLOOD PRESSURE: 123 MMHG | HEIGHT: 60 IN | DIASTOLIC BLOOD PRESSURE: 67 MMHG | WEIGHT: 128.53 LBS | OXYGEN SATURATION: 100 % | HEART RATE: 64 BPM

## 2020-07-07 PROCEDURE — 96372 THER/PROPH/DIAG INJ SC/IM: CPT

## 2020-07-07 PROCEDURE — 2580000003 HC RX 258: Performed by: INTERNAL MEDICINE

## 2020-07-07 PROCEDURE — 6360000002 HC RX W HCPCS: Performed by: INTERNAL MEDICINE

## 2020-07-07 PROCEDURE — 6370000000 HC RX 637 (ALT 250 FOR IP): Performed by: INTERNAL MEDICINE

## 2020-07-07 PROCEDURE — 94640 AIRWAY INHALATION TREATMENT: CPT

## 2020-07-07 PROCEDURE — G0378 HOSPITAL OBSERVATION PER HR: HCPCS

## 2020-07-07 PROCEDURE — 94760 N-INVAS EAR/PLS OXIMETRY 1: CPT

## 2020-07-07 RX ADMIN — PANTOPRAZOLE SODIUM 40 MG: 40 TABLET, DELAYED RELEASE ORAL at 06:51

## 2020-07-07 RX ADMIN — AMLODIPINE BESYLATE 5 MG: 5 TABLET ORAL at 09:48

## 2020-07-07 RX ADMIN — ISOSORBIDE MONONITRATE 30 MG: 30 TABLET, EXTENDED RELEASE ORAL at 09:46

## 2020-07-07 RX ADMIN — HYDRALAZINE HYDROCHLORIDE 50 MG: 50 TABLET, FILM COATED ORAL at 09:46

## 2020-07-07 RX ADMIN — DULOXETINE HYDROCHLORIDE 60 MG: 60 CAPSULE, DELAYED RELEASE ORAL at 09:47

## 2020-07-07 RX ADMIN — ENOXAPARIN SODIUM 40 MG: 40 INJECTION SUBCUTANEOUS at 09:48

## 2020-07-07 RX ADMIN — ASPIRIN 81 MG: 81 TABLET, COATED ORAL at 09:47

## 2020-07-07 RX ADMIN — HYDROCODONE BITARTRATE AND ACETAMINOPHEN 1 TABLET: 5; 325 TABLET ORAL at 09:48

## 2020-07-07 RX ADMIN — METOPROLOL SUCCINATE 25 MG: 25 TABLET, EXTENDED RELEASE ORAL at 09:48

## 2020-07-07 RX ADMIN — GABAPENTIN 300 MG: 300 CAPSULE ORAL at 09:47

## 2020-07-07 RX ADMIN — SODIUM CHLORIDE, PRESERVATIVE FREE 10 ML: 5 INJECTION INTRAVENOUS at 09:49

## 2020-07-07 RX ADMIN — RANOLAZINE 1000 MG: 500 TABLET, FILM COATED, EXTENDED RELEASE ORAL at 09:48

## 2020-07-07 RX ADMIN — CLOPIDOGREL BISULFATE 75 MG: 75 TABLET ORAL at 09:48

## 2020-07-07 RX ADMIN — TORSEMIDE 20 MG: 20 TABLET ORAL at 09:47

## 2020-07-07 RX ADMIN — BUDESONIDE AND FORMOTEROL FUMARATE DIHYDRATE 2 PUFF: 160; 4.5 AEROSOL RESPIRATORY (INHALATION) at 08:29

## 2020-07-07 RX ADMIN — DIVALPROEX SODIUM 250 MG: 250 TABLET, EXTENDED RELEASE ORAL at 09:46

## 2020-07-07 ASSESSMENT — PAIN SCALES - GENERAL: PAINLEVEL_OUTOF10: 7

## 2020-07-07 ASSESSMENT — PAIN - FUNCTIONAL ASSESSMENT: PAIN_FUNCTIONAL_ASSESSMENT: PREVENTS OR INTERFERES SOME ACTIVE ACTIVITIES AND ADLS

## 2020-07-07 ASSESSMENT — PAIN DESCRIPTION - PAIN TYPE: TYPE: ACUTE PAIN

## 2020-07-07 ASSESSMENT — PAIN DESCRIPTION - LOCATION: LOCATION: CHEST;SHOULDER

## 2020-07-07 ASSESSMENT — PAIN DESCRIPTION - ONSET: ONSET: ON-GOING

## 2020-07-07 ASSESSMENT — PAIN DESCRIPTION - ORIENTATION: ORIENTATION: LEFT

## 2020-07-07 ASSESSMENT — PAIN DESCRIPTION - FREQUENCY: FREQUENCY: INTERMITTENT

## 2020-07-07 ASSESSMENT — PAIN DESCRIPTION - DESCRIPTORS: DESCRIPTORS: ACHING;PRESSURE

## 2020-07-07 ASSESSMENT — PAIN DESCRIPTION - PROGRESSION: CLINICAL_PROGRESSION: GRADUALLY WORSENING

## 2020-07-07 NOTE — PLAN OF CARE
Problem: Pain:  Goal: Pain level will decrease  Description: Pain level will decrease  7/7/2020 1147 by Pancho Vizcarra RN  Outcome: Met This Shift  7/7/2020 0406 by Mohsen Hoover RN  Outcome: Ongoing  Note: Pt educated to attempt non-phagological method of pain control, but it it becomes too strong use PRN analgesics. Pain and discomfort being managed PRN analgesics per MD orders. Pt able to express presence of pain. Goal: Control of acute pain  Description: Control of acute pain  7/7/2020 1147 by Pancho Vizcarra RN  Outcome: Met This Shift  7/7/2020 0406 by Mohsen Hoover RN  Outcome: Ongoing  Note: Patient educated on acute pain. Taught patient to use call light to ask for pain medication. PRN pain medication given for acute pain. Will continue to monitor pain per unit protocol. Goal: Control of chronic pain  Description: Control of chronic pain  7/7/2020 1147 by Pancho Vizcarra RN  Outcome: Met This Shift  7/7/2020 0406 by Mohsen Hoover RN  Outcome: Ongoing  Note: Patient educated on chronic pain. Taught patient to use call light to ask for pain medication. PRN pain medication given for chronic pain. Will continue to monitor pain per unit protocol. Problem: Discharge Planning:  Goal: Discharged to appropriate level of care  Description: Discharged to appropriate level of care  7/7/2020 1147 by Pancho Vizcarra RN  Outcome: Met This Shift  7/7/2020 0406 by Mohsen Hoover RN  Outcome: Ongoing  Note: Assessed patients knowledge of discharge. Will continue to work with patient on discharge planning and answer patient questions. Will consult case management and  as necessary. Problem:  Activity Intolerance:  Goal: Ability to tolerate increased activity will improve  Description: Ability to tolerate increased activity will improve  7/7/2020 1147 by Pancho Vizcarra RN  Outcome: Met This Shift  7/7/2020 0406 by Mohsen Hoover RN  Outcome: Central Venous Catheter-Associated Bloodstream Infection:  Goal: Will show no infection signs and symptoms  Description: Will show no infection signs and symptoms  7/7/2020 1147 by Neri Lu RN  Outcome: Met This Shift  7/7/2020 0406 by Christopher Denny RN  Outcome: Ongoing  Note: No s/s of infection noted from central line.

## 2020-07-07 NOTE — PROGRESS NOTES
Patient resting in bed. Tele monitor watcher report pt is AJR. Pt denies complaints of chest pain at this time, no sob noted. Will continue to monitor pt. Assessment done and charted.

## 2020-07-07 NOTE — PROGRESS NOTES
Patient in bed, she is alert and oriented x4. Complains of pain to the left chest and shoulder, intensity is pressure, aching, 7/10 on the numerical pain scale  given PRN pain medication. She denies nausea. / vomiting. She is up at anitha. Call light in reach.

## 2020-07-07 NOTE — CARE COORDINATION
Prep for Gastric Emptying is listed below. This was placed in patient's chart yesterday morning. Due to patient being given Norco and Morphine, the soonest we can perform this exam will be Thursday 7/9/20. Nuclear Medicine Gatric Emptying Exam    Prep:  NPO 6 hrs prior  No stomach medicines for 2 day prior  No prokineticagents or Reglan 2 days prior  No opiates, bentyl or antispasmodic medications 2 days prior      *Phenergan and Zofran can be given    This is a 4 hour exam and the patient may not eat or drink anything during this period except water. If any questions please call Nuclear Medicine [phone].

## 2020-07-07 NOTE — PLAN OF CARE
Patient has maintained a patent airway, repositions self, lung sounds bilaterally diminished at bases no cough noted. Patient has had adequate fluid intake and is not on a fluid restriction, no need to suction airway at this time, educated patient to turn and cough and deep breathe every two hours and as needed. Will continue to monitor and reassess. Problem: Breathing Pattern - Ineffective:  Goal: Ability to achieve and maintain a regular respiratory rate will improve  Description: Ability to achieve and maintain a regular respiratory rate will improve  7/7/2020 0406 by Riky Price RN  Outcome: Ongoing  Note: Respiratory Rate and effort and pattern is WNLs,  patient does not appear air hungry and no anxiety noted this shift, patient is able to ambulate well without dyspnea or sob, planning activities to conserve energy. Will continue to monitor and reassess      Problem: Gas Exchange - Impaired:  Goal: Levels of oxygenation will improve  Description: Levels of oxygenation will improve  7/7/2020 0406 by Riky Price RN  Outcome: Ongoing  Note: O2 sats wnl     Problem: Tissue Perfusion - Cardiopulmonary, Altered:  Goal: Absence of angina  Description: Absence of angina  7/7/2020 0406 by Riky Price RN  Outcome: Ongoing  Note: Patient has had not complaints of chest pain up to this point I care. Will continue to monitor. Problem: Infection - Central Venous Catheter-Associated Bloodstream Infection:  Goal: Will show no infection signs and symptoms  Description: Will show no infection signs and symptoms  7/7/2020 0406 by Riky Price RN  Outcome: Ongoing  Note: No s/s of infection noted from central line.

## 2020-07-07 NOTE — PROGRESS NOTES
Hospitalist Progress Note      PCP: Emelia Burgess MD    Date of Admission: 7/4/2020    Chief Complaint: chest pain    Hospital Course:  62 y.o. female presenting with chest pain, 1 day, sudden onset,  left-sided with radiation to the left upper extremity, pressure-like quality, severe intensity, no aggravators, partial relief from nitrate therapy, associated with dyspnea. Subjective: Pt states that she feels better overall. Chest pain is mostly relieved. Await EGD today      Medications:  Reviewed    Infusion Medications   Scheduled Medications    pantoprazole  40 mg Oral QAM AC    enoxaparin  40 mg Subcutaneous Daily    sodium chloride flush  10 mL Intravenous 2 times per day    amLODIPine  5 mg Oral Daily    aspirin  81 mg Oral Daily    atorvastatin  80 mg Oral Nightly    budesonide-formoterol  2 puff Inhalation Daily    clopidogrel  75 mg Oral Daily    divalproex  250 mg Oral BID    DULoxetine  60 mg Oral Daily    gabapentin  300 mg Oral QAM    gabapentin  600 mg Oral Nightly    hydrALAZINE  50 mg Oral BID    isosorbide mononitrate  30 mg Oral Daily    metoprolol succinate  25 mg Oral BID WC    torsemide  20 mg Oral Daily    ranolazine  1,000 mg Oral BID     PRN Meds: nitroGLYCERIN, HYDROcodone 5 mg - acetaminophen, prochlorperazine, morphine, sodium chloride flush, acetaminophen **OR** acetaminophen, polyethylene glycol, promethazine **OR** ondansetron      Intake/Output Summary (Last 24 hours) at 7/6/2020 2015  Last data filed at 7/6/2020 1832  Gross per 24 hour   Intake 830 ml   Output --   Net 830 ml       Physical Exam Performed:    BP (!) 198/56   Pulse 81   Temp 98.3 °F (36.8 °C) (Oral)   Resp 12   Ht 5' (1.524 m)   Wt 128 lb (58.1 kg)   SpO2 96%   BMI 25.00 kg/m²     General appearance: No apparent distress, appears stated age and cooperative. HEENT: Pupils equal, round, and reactive to light. Conjunctivae/corneas clear. Neck: Supple, with full range of motion.  No jugular venous distention. Trachea midline. Respiratory:  Normal respiratory effort. Clear to auscultation, bilaterally without Rales/Wheezes/Rhonchi. Cardiovascular: Regular rate and rhythm with normal S1/S2 without murmurs, rubs or gallops. Abdomen: Soft, non-tender, non-distended with normal bowel sounds. Musculoskeletal: No clubbing, cyanosis or edema bilaterally. Full range of motion without deformity. Skin: Skin color, texture, turgor normal.  No rashes or lesions. Neurologic:  Neurovascularly intact without any focal sensory/motor deficits. Cranial nerves: II-XII intact, grossly non-focal.  Psychiatric: Alert and oriented, thought content appropriate, normal insight  Capillary Refill: Brisk,< 3 seconds   Peripheral Pulses: +2 palpable, equal bilaterally       Labs:   Recent Labs     07/04/20  1535 07/05/20  0551 07/06/20  1128   WBC 5.3 5.0 4.6   HGB 9.9* 11.7* 10.6*   HCT 29.9* 35.5* 32.1*    183 186     Recent Labs     07/04/20  1535 07/05/20  0551 07/06/20  1128    140 140   K 3.7 3.4* 4.2    100 101   CO2 25 25 28   BUN 27* 22* 25*   CREATININE 1.7* 1.3* 1.5*   CALCIUM 9.1 9.1 8.9     Recent Labs     07/04/20  1535   AST 25   ALT 23   BILITOT <0.2   ALKPHOS 121     No results for input(s): INR in the last 72 hours. Recent Labs     07/04/20  1535   TROPONINI <0.01       Urinalysis:      Lab Results   Component Value Date    NITRU Negative 08/29/2019    WBCUA 3 08/29/2019    BACTERIA RARE 08/29/2019    RBCUA 3 08/29/2019    BLOODU Negative 08/29/2019    SPECGRAV 1.013 08/29/2019    GLUCOSEU Negative 08/29/2019    GLUCOSEU NEGATIVE 05/14/2012       Radiology:  XR CHEST PORTABLE   Final Result   1. The right IJ central line is in the SVC. No pneumothorax. 2. Resolved interstitial edema.          XR CHEST PORTABLE   Final Result   Interstitial edema         NM GASTRIC EMPTYING    (Results Pending)           Assessment/Plan:    Active Hospital Problems    Diagnosis    Angina at rest ([facility] [I20.8]    CKD (chronic kidney disease) stage 3, GFR 30-59 ml/min ([facility]) [N18.3]    DMII (diabetes mellitus, type 2) ([facility] [E11.9]    Unstable angina ([facility]) [I20.0]    A-fib ([facility]) [I48.91]    Gastro-esophageal reflux disease without esophagitis [K21.9]    COPD (chronic obstructive pulmonary disease) ([facility]) [J44.9]    Chronic diastolic CHF (congestive heart failure), NYHA class 2 ([facility]) [I50.32]    CAD (coronary artery disease) [I25.10]    HTN (hypertension) [I10]     Atypical chest pain, rule out GI etiology  Diabetes mellitus  CAD  CKD stage III  Hypertension  COPD  Dyslipidemia    Plan:  -Cardiology signed off, no further intervention necessary  -GI consulted, await EGD today  -continue aspirin, plavix, statin  -continue BP meds  -continue inhalers  -continue rest of home meds    DVT Prophylaxis: lovenox  Diet: DIET CARB CONTROL;  Code Status: Full Code      Dispo - pending clinical course    Savage Higgins MD

## 2020-07-07 NOTE — PROGRESS NOTES
Data- discharge order received, pt verbalized agreement to discharge, disposition to previous residence, With [facility]- discharge instructions prepared and given to Ms Sunitha Roblero, pt verbalized understanding. Medication information packet given r/t NEW and/or CHANGED prescriptions emphasizing name/purpose/side effects, pt verbalized understanding. Discharge instruction summary: Diet- cardiac, Activity- as tolerated, Primary Care Physician as followsRamez Deal -[phone] f/u appointment to be made by patient, prescription medications filled at the patient's CHF Education reviewed. Pt/ has had a total of 60 minutes CHF education this admission encounter. Response- Pt belongings gathered, IV removed. Disposition is home with [address], taken to lobby via w/ PCA Alejandra , no complications.

## 2020-07-08 NOTE — DISCHARGE SUMMARY
Hospitalist Discharge Summary    Patient ID:  Anita Massey  [id number]  62 y.o.  1955    Admit date: 7/4/2020    Discharge date: 7/7/2020    Disposition: home    Admission Diagnoses:   Patient Active Problem List   Diagnosis    HTN (hypertension)    Hyperlipidemia    CAD (coronary artery disease)    Palpitation    PVC (premature ventricular contraction)    Depression    Migraine with aura, intractable    Hemisensory loss    PTSD (post-traumatic stress disorder)    Insomnia    Snoring    Atypical chest pain    Dysarthria    Port-A-Cath in place    Inferior vena cava occlusion ([facility])    Cataract    Benign essential tremor    Tobacco use    Chronic diastolic CHF (congestive heart failure), NYHA class 2 ([facility])    COPD (chronic obstructive pulmonary disease) ([address]    Pulmonary edema    NSTEMI (non-ST elevated myocardial infarction) ([facility])    Rotator cuff tendonitis    Essential hypertension    Fibromyalgia    Chronic pain syndrome    Headache, chronic migraine without aura, intractable, with status    Type 2 diabetes mellitus with diabetic chronic kidney disease ([address]    S/P right coronary artery (RCA) stent placement    Irritable bowel syndrome    Giant cell arteritis ([facility])    Gastro-esophageal reflux disease without esophagitis    A-fib ([facility])    Abscess of groin, right    Precordial chest pain    Coronary artery disease involving native coronary artery of native heart with angina pectoris ([address]    COPD exacerbation ([address]    DM (diabetes mellitus), type 2, uncontrolled ([address]    Right knee pain    Chronic painful diabetic neuropathy ([address]    CAD in native artery    Dyspnea on exertion    Unstable angina (HCC)    Sepsis ([address]    Acute respiratory failure with hypoxia ([facility])    Generalized weakness    Reactive airway disease with wheezing with acute exacerbation    Headache    DMII (diabetes mellitus, type 2) ([address]    Metabolic acidosis    Acute-on-chronic renal failure (HCC)    Age-related nuclear cataract, bilateral    Chronic prescription opiate use    Coronary stent restenosis due to progression of disease    Current moderate episode of major depressive disorder ([facility])    Diabetic retinopathy of both eyes without macular edema associated with type 2 diabetes mellitus ([facility]    Hypertensive heart and chronic kidney disease with heart failure and stage 1 through stage 4 chronic kidney disease, or unspecified chronic kidney disease (HCC)    Hypertensive retinopathy, bilateral    Ischemic cardiomyopathy    Moderate episode of recurrent major depressive disorder ([facility])    Nausea    Other age-related incipient cataract, bilateral    Presence of intraocular lens    Punctate keratitis, bilateral    Regular astigmatism, bilateral    Palliative care encounter    CKD (chronic kidney disease) stage 3, GFR 30-59 ml/min ([facility])    Compression of brain ([facility]    Thrombosis of superior vena cava, chronic ([facility])    Type 2 diabetes mellitus with mild nonproliferative diabetic retinopathy without macular edema, bilateral ([facility])    Uncomplicated opioid dependence ([facility]    Chest pain    Acute chest pain    Acute onset aura migraine    Angina at rest [facility])       Discharge Diagnoses: Principal Problem:    Angina at rest [facility])  Active Problems:    HTN (hypertension)    CAD (coronary artery disease)    Chronic diastolic CHF (congestive heart failure), NYHA class 2 ([facility])    COPD (chronic obstructive pulmonary disease) ([facility])    Gastro-esophageal reflux disease without esophagitis    A-fib ([facility])    Unstable angina ([facility])    DMII (diabetes mellitus, type 2) ([facility])    CKD (chronic kidney disease) stage 3, GFR 30-59 ml/min ([facility])  Resolved Problems:    * No resolved hospital problems. *      Code Status:  Prior    Condition:  Stable    Discharge Diet: Diet:  No diet orders on file    PCP to do list:  F/u for improvement of symptoms.  Needs outpatient gastric emptying study    Hospital Course: 62 y. o. female presenting with chest pain, 1 day, sudden onset,  left-sided with radiation to the left upper extremity, pressure-like quality, severe intensity, no aggravators, partial relief from nitrate therapy, associated with dyspnea. Due to concern for acute coronary syndrome patient was admitted to the hospital.  Cardiology was consulted who suggested that her chest pain was not cardiac in origin. Gastroenterology was consulted given concern for GI etiology for her symptoms patient underwent EGD which did not show any acute findings. GI did not recommend doing a gastric emptying study but since patient was on opiates , it could not be done while inpatient. GI cleared the patient for discharge and recommended that she follows up outpatient to get up gastric emptying study done. Patient was discharged home in stable condition    Atypical chest pain, rule out GI etiology  Diabetes mellitus  CAD  CKD stage III  Hypertension  COPD  Dyslipidemia     Plan:  -Cardiology signed off, no further intervention necessary  -GI consulted,EGD negative.  Plan for gastric emptying study as outpatient  -continue aspirin, plavix, statin  -continue BP meds  -continue inhalers  -continue rest of home meds    Discharge Medications:   Discharge Medication List as of 7/7/2020 12:01 PM      START taking these medications    Details   pantoprazole (PROTONIX) 40 MG tablet Take 1 tablet by mouth every morning (before breakfast), Disp-30 tablet, R-3Normal           Discharge Medication List as of 7/7/2020 12:01 PM        Discharge Medication List as of 7/7/2020 12:01 PM      CONTINUE these medications which have NOT CHANGED    Details   QUEtiapine (SEROQUEL) 25 MG tablet Take 1-2 tablets by mouth nightly, Disp-60 tablet, R-0Normal      DULoxetine (CYMBALTA) 60 MG extended release capsule Take 1 capsule by mouth daily, Disp-30 capsule, R-0Normal      oxyCODONE-acetaminophen (PERCOCET) 7.5-325 MG per tablet Take 1 tablet by mouth every 6 hours as needed for Pain (max 3/day) for up to 28 days. , Disp-84 tablet, R-0Normal      methocarbamol (ROBAXIN) 500 MG tablet Take 1 tablet by mouth 2 times daily, Disp-60 tablet, R-0Normal      divalproex (DEPAKOTE ER) 250 MG extended release tablet Take 1 tablet by mouth 2 times daily, Disp-60 tablet, R-0Normal      rizatriptan (MAXALT) 10 MG tablet Take 1 tablet by mouth once as needed for Migraine May repeat in 2 hours if needed, Disp-30 tablet, R-0Normal      butalbital-acetaminophen-caffeine (FIORICET, ESGIC) -40 MG per tablet Take 1 tablet by mouth every 6 hours as needed for Headaches, Disp-50 tablet, R-0Normal      ondansetron (ZOFRAN) 4 MG tablet Take 1 tablet by mouth 3 times daily as needed for Nausea or Vomiting, Disp-30 tablet, R-0Normal      gabapentin (NEURONTIN) 300 MG capsule take1 tab am, take 2 tabs pm, Disp-90 capsule, R-0Normal      Ubrogepant (UBRELVY) 50 MG TABS Talk 1 tablet as needed at start of headaches, can repeat in 24 hours, Disp-10 tablet, R-0Normal      insulin aspart (NOVOLOG FLEXPEN) 100 UNIT/ML injection pen Inject 5-12 Units into the skin 3 times daily (before meals) Sugar < 150   Zero Unit  151-200   5 U   201- 250   8 U   251-300    12 U, Disp-7 pen, R-5Normal      gabapentin (NEURONTIN) 600 MG tablet Take 600 mg by mouth nightly. Historical Med      torsemide (DEMADEX) 10 MG tablet TAKE TWO TABLETS BY MOUTH DAILY, Disp-60 tablet, R-5Normal      Nebulizers (COMPRESSOR/NEBULIZER) MISC 4 TIMES DAILY PRN Starting u 3/26/2020, Disp-1 each, R-0, Normal      albuterol (PROVENTIL) (2.5 MG/3ML) 0.083% nebulizer solution Take 3 mLs by nebulization every 4 hours as needed for Wheezing, Disp-120 each, R-5Normal      hydrALAZINE (APRESOLINE) 50 MG tablet TAKE 1 TABLET BY MOUTH 2 TIMES DAILY, Disp-60 tablet, R-5Normal      Lancet Devices (SIMPLE DIAGNOSTICS LANCING DEV) MISC Disp-1 each, R-11, Normal      Blood Glucose Monitoring Suppl (TRUE METRIX METER) w/Device KIT USE TO TEST BLOOD GLUCOSE, Disp-1 kit, R-0Normal      TRUE METRIX BLOOD GLUCOSE TEST strip USE TO TEST BLOOD GLUCOSE THREE TIMES DAILY, Disp-250 each, R-5Normal      Pharmacist Choice Lancets MISC Disp-300 each, R-5, Normal      UltiCare Alcohol Swabs 70 % PADS USE TO TEST BLOOD GLUCOSE THREE TIMES DAILY, Disp-100 each, R-5Normal      UNIFINE PENTIPS 31G X 8 MM MISC Disp-100 each, R-5, Normal      BASAGLAR KWIKPEN 100 UNIT/ML injection pen INJECT 60 UNITS INTO THE SKIN 2 TIMES DAILY, Disp-15 mL, R-5Normal      budesonide-formoterol (SYMBICORT) 160-4.5 MCG/ACT AERO Inhale 2 puffs into the lungs daily, Disp-2 Inhaler, R-5Normal      albuterol sulfate HFA (VENTOLIN HFA) 108 (90 Base) MCG/ACT inhaler Inhale 2 puffs into the lungs every 4 hours as needed for Wheezing or Shortness of Breath, Disp-3 Inhaler, R-5Normal      ranolazine (RANEXA) 1000 MG extended release tablet Take 1 tablet by mouth 2 times daily, Disp-60 tablet, R-8Normal      clopidogrel (PLAVIX) 75 MG tablet TAKE 1 TABLET BY MOUTH DA JULIEN, Disp-90 tablet, R-5Normal      metoprolol succinate (TOPROL XL) 50 MG extended release tablet Take 0.5 tablets by mouth 2 times daily (with meals), Disp-30 tablet, R-5Normal      amLODIPine (NORVASC) 5 MG tablet Take 1 tablet by mouth daily, Disp-90 tablet, R-5Normal      atorvastatin (LIPITOR) 80 MG tablet Take 1 tablet by mouth daily, Disp-90 tablet, R-3Normal      aspirin (ASPIRIN LOW DOSE) 81 MG EC tablet Take 1 tablet by mouth daily, Disp-90 tablet, R-5Normal      isosorbide mononitrate (IMDUR) 30 MG extended release tablet Take 1 tablet by mouth daily, Disp-90 tablet, R-5Normal      nitroGLYCERIN (NITROSTAT) 0.4 MG SL tablet Place 1 tablet under the tongue every 5 minutes as needed for Chest pain up to max of 3 total doses.  If no relief after 1 dose, call 911., Disp-25 tablet, R-3Normal           Discharge Medication List as of 7/7/2020 12:01 PM      STOP taking these medications omeprazole (PRILOSEC) 20 MG delayed release capsule Comments:   Reason for Stopping:                   Procedures: EGD    Assessment on Discharge: Stable, improved     Discharge ROS:  A complete review of systems was asked and negative except for none    Discharge Exam:  /67   Pulse 64   Temp 97.7 °F (36.5 °C) (Oral)   Resp 16   Ht 5' (1.524 m)   Wt 128 lb 8.5 oz (58.3 kg)   SpO2 100%   BMI 25.10 kg/m²     Gen: NAD  HEENT: NC/AT, moist mucous membranes, no oropharyngeal erythema or exudate  Neck: supple, trachea midline, no anterior cervical or SC LAD  Heart:  Normal s1/s2, RRR, no murmurs, gallops, or rubs. no leg edema  Lungs:  CTA bilaterally,   Abd: bowel sounds present, soft, nontender, nondistended, no masses  Extrem:  No clubbing, cyanosis,  no edema  Skin: no lesion or masses  Psych:  A & O x3  Neuro: grossly intact, moves all four extremities    Pertinent Studies During Hospital Stay:  Radiology:  Xr Chest Portable    Result Date: 7/4/2020  EXAMINATION: ONE XRAY VIEW OF THE CHEST 7/4/2020 5:54 pm COMPARISON: 07/04/2020 HISTORY: ORDERING SYSTEM PROVIDED HISTORY: line placement TECHNOLOGIST PROVIDED HISTORY: Reason for exam:->line placement Reason for Exam: picc line placement Acuity: Acute Type of Exam: Initial Relevant Medical/Surgical History: htn, heart disease, FINDINGS: The right IJ central line distal tip is in the proximal SVC. No focal infiltrate, pleural effusion or pneumothorax is demonstrated. Interstitial edema has resolved. The heart is stable in size. No acute osseous abnormality is seen. 1. The right IJ central line is in the SVC. No pneumothorax. 2. Resolved interstitial edema. Xr Chest Portable    Result Date: 7/4/2020  EXAMINATION: ONE XRAY VIEW OF THE CHEST 7/4/2020 3:51 pm COMPARISON: 05/11/2020 HISTORY: ORDERING SYSTEM PROVIDED HISTORY: CP TECHNOLOGIST PROVIDED HISTORY: Reason for exam:->CP Reason for Exam: Chest Pain (x 1 hour. took 3 nitro that did not help. has a cardiac history. ) Acuity: Acute Type of Exam: Initial Relevant Medical/Surgical History: heart disease, copd, cad, htn, FINDINGS: There is interstitial edema. No focal infiltrate, pleural effusion or pneumothorax is demonstrated. The heart is stable in size. No acute osseous abnormality is seen. Interstitial edema           Last Labs on Discharge:     No results found for this or any previous visit (from the past 24 hour(s)). Follow up: with Chinyere Cash MD    Note that over 30 minutes was spent in preparing discharge papers, discussing discharge with patient, medication review, etc.    Thank you Chinyere Cash MD for the opportunity to be involved in this patient's care. If you have any questions or concerns please feel free to contact me at [phone].     Electronically signed by John Mccollum MD on 7/8/2020 at 2:06 PM

## 2020-07-09 ENCOUNTER — TELEPHONE (OUTPATIENT)
Dept: PRIMARY CARE CLINIC | Age: 65
End: 2020-07-09

## 2020-07-09 NOTE — TELEPHONE ENCOUNTER
[facility] received a call from:    Name of Caller: Zainab    Relationship to patient:n/a    Organization name: [id number] contact number: [phone]    Reason for call: Verifying if Dr. Irene Flowers will sign Home care orders. Please advise.

## 2020-07-17 ENCOUNTER — TELEPHONE (OUTPATIENT)
Dept: PRIMARY CARE CLINIC | Age: 65
End: 2020-07-17

## 2020-07-17 NOTE — TELEPHONE ENCOUNTER
Viral Infection , besides You probably are immune to ALL antibiotics     Take ES  tylenol qid prn .  Gargle with Listerine

## 2020-07-17 NOTE — TELEPHONE ENCOUNTER
----- Message from [facility] [place] [facility] sent at 7/17/2020  1:55 PM EDT -----  Subject: Message to Provider    QUESTIONS  Information for Provider? Pt would like a call back. Matt Paula up before I was   able to get message. ---------------------------------------------------------------------------  --------------  Rain WHYTE  What is the best way for the office to contact you? OK to leave message on   voicemail  Preferred Call Back Phone Number? [phone]  ---------------------------------------------------------------------------  --------------  SCRIPT ANSWERS  Relationship to Patient?  Self

## 2020-07-22 ENCOUNTER — VIRTUAL VISIT (OUTPATIENT)
Dept: PAIN MANAGEMENT | Age: 65
End: 2020-07-22
Payer: COMMERCIAL

## 2020-07-22 PROCEDURE — 3017F COLORECTAL CA SCREEN DOC REV: CPT | Performed by: INTERNAL MEDICINE

## 2020-07-22 PROCEDURE — 2022F DILAT RTA XM EVC RTNOPTHY: CPT | Performed by: INTERNAL MEDICINE

## 2020-07-22 PROCEDURE — 99213 OFFICE O/P EST LOW 20 MIN: CPT | Performed by: INTERNAL MEDICINE

## 2020-07-22 PROCEDURE — G8428 CUR MEDS NOT DOCUMENT: HCPCS | Performed by: INTERNAL MEDICINE

## 2020-07-22 PROCEDURE — 3046F HEMOGLOBIN A1C LEVEL >9.0%: CPT | Performed by: INTERNAL MEDICINE

## 2020-07-22 PROCEDURE — 1111F DSCHRG MED/CURRENT MED MERGE: CPT | Performed by: INTERNAL MEDICINE

## 2020-07-22 RX ORDER — METHOCARBAMOL 500 MG/1
500 TABLET, FILM COATED ORAL 2 TIMES DAILY
Qty: 60 TABLET | Refills: 1 | Status: SHIPPED | OUTPATIENT
Start: 2020-07-22 | End: 2020-08-19 | Stop reason: SDUPTHER

## 2020-07-22 RX ORDER — QUETIAPINE FUMARATE 25 MG/1
25-50 TABLET, FILM COATED ORAL NIGHTLY
Qty: 60 TABLET | Refills: 1 | Status: SHIPPED | OUTPATIENT
Start: 2020-07-22 | End: 2020-08-19 | Stop reason: SDUPTHER

## 2020-07-22 RX ORDER — OXYCODONE AND ACETAMINOPHEN 7.5; 325 MG/1; MG/1
1 TABLET ORAL EVERY 6 HOURS PRN
Qty: 84 TABLET | Refills: 0 | Status: SHIPPED | OUTPATIENT
Start: 2020-07-22 | End: 2020-08-19 | Stop reason: SDUPTHER

## 2020-07-22 RX ORDER — DULOXETIN HYDROCHLORIDE 60 MG/1
60 CAPSULE, DELAYED RELEASE ORAL DAILY
Qty: 30 CAPSULE | Refills: 1 | Status: SHIPPED | OUTPATIENT
Start: 2020-07-22 | End: 2020-08-19 | Stop reason: SDUPTHER

## 2020-07-22 RX ORDER — GABAPENTIN 600 MG/1
600 TABLET ORAL NIGHTLY
Qty: 90 TABLET | Refills: 1 | Status: SHIPPED | OUTPATIENT
Start: 2020-07-22 | End: 2020-08-19 | Stop reason: SDUPTHER

## 2020-07-22 RX ORDER — DIVALPROEX SODIUM 250 MG/1
250 TABLET, EXTENDED RELEASE ORAL 2 TIMES DAILY
Qty: 60 TABLET | Refills: 1 | Status: SHIPPED | OUTPATIENT
Start: 2020-07-22 | End: 2020-08-19 | Stop reason: SDUPTHER

## 2020-07-22 RX ORDER — HYDRALAZINE HYDROCHLORIDE 50 MG/1
50 TABLET, FILM COATED ORAL 2 TIMES DAILY
Qty: 60 TABLET | Refills: 5 | Status: SHIPPED | OUTPATIENT
Start: 2020-07-22 | End: 2020-10-01 | Stop reason: SDUPTHER

## 2020-07-22 RX ORDER — RIZATRIPTAN BENZOATE 10 MG/1
10 TABLET ORAL
Qty: 30 TABLET | Refills: 0 | Status: SHIPPED | OUTPATIENT
Start: 2020-07-22 | End: 2020-08-13 | Stop reason: SDUPTHER

## 2020-07-22 NOTE — PROGRESS NOTES
TELE HEALTH VISIT (AUDIO-VISUAL)    Pursuant to the emergency declaration under the [facility], [facility] waiver authority and the [employer] and [employer] Act, this Virtual  Visit was conducted, with patient's/legal guardian's consent, to reduce the patient's risk of exposure to COVID-19 and provide continuity of care for an established patient. Service is  provided through a video synchronous discussion virtually to substitute for in-person clinic visit. Due to this being a TeleHealth encounter (During BQTVY-55 public [facility] emergency), evaluation of the following organ systems was limited: Vitals/Constitutional/EENT/Resp/CV/GI//MS/Neuro/Skin/Mqso-Xrid-Cue. Ed Ship  1955  [id number]    Ms. Ynes Deleon is being seen virtually for a follow up visit using one of the following techniques  [employer] Duo  Informed verbal consent to the virtual visit was obtained from Ms. Meza. Risks associated with HIPPA compliance with the virtual visit was explained to the patient. Ms. Ynes Deleon is at her residence and Dr. Rj Aguirre is in his office. HISTORY OF PRESENT ILLNESS:  Ms. Ynes Deleon is a 62 y.o. female  being assessed for a follow up visit for pain management for evaluation of ongoing care regarding her symptoms and monitoring of compliance with long term use high risk medications. She has a diagnosis of   1. Chronic pain syndrome    2. DDD (degenerative disc disease), lumbar    3. Fibromyalgia    4. DDD (degenerative disc disease), cervical    5. Rotator cuff tendonitis, right    . She complains of pain in the head. She rates the pain 8/10 and describes it as sharp. Current treatment regimen has helped relieve about 60% of the pain. She denies any side effects from the current pain regimen.  Patient reports that since the last follow up visit the physical functioning is unchanged, family/social relationships are unchanged, mood is unchanged sleep patterns are unchanged, and that the overall functioning is unchanged. Patient denies misusing/abusing her narcotic pain medications or using any illegal drugs. There are No indicators for possible drug abuse, addiction or diversion problems. Ms. Wade Buck states she has been having bad pain, \"head hurts,\" and her body hurts also. She states she has been using Depakote ER along with Maxalt. Patient mentions she is using Percocet 3 per day. Patient denies any constipation symptoms. She says she is staying at home mostly. Patient reports she is managing very little house chores. ALLERGIES: Patients list of allergies were reviewed     MEDICATIONS: Ms. Wade Buck list of medications were reviewed. Her current medications are   Outpatient Medications Prior to Visit   Medication Sig Dispense Refill    pantoprazole (PROTONIX) 40 MG tablet Take 1 tablet by mouth every morning (before breakfast) 30 tablet 3    QUEtiapine (SEROQUEL) 25 MG tablet Take 1-2 tablets by mouth nightly 60 tablet 0    DULoxetine (CYMBALTA) 60 MG extended release capsule Take 1 capsule by mouth daily 30 capsule 0    oxyCODONE-acetaminophen (PERCOCET) 7.5-325 MG per tablet Take 1 tablet by mouth every 6 hours as needed for Pain (max 3/day) for up to 28 days.  84 tablet 0    methocarbamol (ROBAXIN) 500 MG tablet Take 1 tablet by mouth 2 times daily 60 tablet 0    divalproex (DEPAKOTE ER) 250 MG extended release tablet Take 1 tablet by mouth 2 times daily 60 tablet 0    rizatriptan (MAXALT) 10 MG tablet Take 1 tablet by mouth once as needed for Migraine May repeat in 2 hours if needed 30 tablet 0    butalbital-acetaminophen-caffeine (FIORICET, ESGIC) -40 MG per tablet Take 1 tablet by mouth every 6 hours as needed for Headaches 50 tablet 0    ondansetron (ZOFRAN) 4 MG tablet Take 1 tablet by mouth 3 times daily as needed for Nausea or Vomiting 30 tablet 0    gabapentin (NEURONTIN) 300 MG capsule take1 tab am, take 2 tabs pm (Patient taking differently: Take 300 mg by mouth every morning. take1 tab am, take 2 tabs pm) 90 capsule 0    Ubrogepant (UBRELVY) 50 MG TABS Talk 1 tablet as needed at start of headaches, can repeat in 24 hours 10 tablet 0    insulin aspart (NOVOLOG FLEXPEN) 100 UNIT/ML injection pen Inject 5-12 Units into the skin 3 times daily (before meals) Sugar < 150   Zero Unit  151-200   5 U   201- 250   8 U   251-300    12 U 7 pen 5    gabapentin (NEURONTIN) 600 MG tablet Take 600 mg by mouth nightly.       torsemide (DEMADEX) 10 MG tablet TAKE TWO TABLETS BY MOUTH DAILY 60 tablet 5    Nebulizers (COMPRESSOR/NEBULIZER) MISC 1 Device by Does not apply route 4 times daily as needed (SOB / Wheezing) 1 each 0    albuterol (PROVENTIL) (2.5 MG/3ML) 0.083% nebulizer solution Take 3 mLs by nebulization every 4 hours as needed for Wheezing 120 each 5    hydrALAZINE (APRESOLINE) 50 MG tablet TAKE 1 TABLET BY MOUTH 2 TIMES DAILY 60 tablet 5    Lancet Devices (SIMPLE DIAGNOSTICS LANCING DEV) MISC USE WITH LANCETS TO TEST BLOOD GLUCOSE 1 each 11    Blood Glucose Monitoring Suppl (TRUE METRIX METER) w/Device KIT USE TO TEST BLOOD GLUCOSE 1 kit 0    TRUE METRIX BLOOD GLUCOSE TEST strip USE TO TEST BLOOD GLUCOSE THREE TIMES DAILY 250 each 5    Pharmacist Choice Lancets MISC USE TO TEST BLOOD GLUCOSE THREE TIMES DAILY 300 each 5    UltiCare Alcohol Swabs 70 % PADS USE TO TEST BLOOD GLUCOSE THREE TIMES DAILY 100 each 5    UNIFINE PENTIPS 31G X 8 MM MISC USE WITH insulin pens 100 each 5    BASAGLAR KWIKPEN 100 UNIT/ML injection pen INJECT 60 UNITS INTO THE SKIN 2 TIMES DAILY 15 mL 5    budesonide-formoterol (SYMBICORT) 160-4.5 MCG/ACT AERO Inhale 2 puffs into the lungs daily 2 Inhaler 5    albuterol sulfate HFA (VENTOLIN HFA) 108 (90 Base) MCG/ACT inhaler Inhale 2 puffs into the lungs every 4 hours as needed for Wheezing or Shortness of Breath 3 Inhaler 5    ranolazine (RANEXA) 1000 MG extended release tablet Take 1 tablet by Extremities: Range of motion is normal. Gait is normal, assistive devices use: none. IMPRESSION:   1. Chronic pain syndrome    2. DDD (degenerative disc disease), lumbar    3. Fibromyalgia    4. DDD (degenerative disc disease), cervical    5. Rotator cuff tendonitis, right    6. Chronic painful diabetic neuropathy (HCC)        PLAN:  Informed verbal consent regarding treatment was obtained  -[place] was obtained and reviewed  for the last one year and no indicators of drug misuse  were found. Any other controlled substance prescriptions  seen on the record have been accounted for, I am aware of the patient receiving these medications. Rola Blood [place] record will be rechecked as part of office protocol.    -continue with current opioid regimen Percocet 3 per day  -she was advised  to avoid using too many OTC analgesics to control the headaches, avoid chocolates, increased caffeine, cheeses and MSG nitrite containing foods, cigarette smoking. To avoid bright lights, strong smells and skipping meals.   -walking/stretching exercises as advised    -d/c Ubrelvy, insurance not covering it     Current Outpatient Medications   Medication Sig Dispense Refill    pantoprazole (PROTONIX) 40 MG tablet Take 1 tablet by mouth every morning (before breakfast) 30 tablet 3    QUEtiapine (SEROQUEL) 25 MG tablet Take 1-2 tablets by mouth nightly 60 tablet 0    DULoxetine (CYMBALTA) 60 MG extended release capsule Take 1 capsule by mouth daily 30 capsule 0    oxyCODONE-acetaminophen (PERCOCET) 7.5-325 MG per tablet Take 1 tablet by mouth every 6 hours as needed for Pain (max 3/day) for up to 28 days.  84 tablet 0    methocarbamol (ROBAXIN) 500 MG tablet Take 1 tablet by mouth 2 times daily 60 tablet 0    divalproex (DEPAKOTE ER) 250 MG extended release tablet Take 1 tablet by mouth 2 times daily 60 tablet 0    rizatriptan (MAXALT) 10 MG tablet Take 1 tablet by mouth once as needed for Migraine May repeat in 2 hours if needed 30 tablet 0    butalbital-acetaminophen-caffeine (FIORICET, ESGIC) -40 MG per tablet Take 1 tablet by mouth every 6 hours as needed for Headaches 50 tablet 0    ondansetron (ZOFRAN) 4 MG tablet Take 1 tablet by mouth 3 times daily as needed for Nausea or Vomiting 30 tablet 0    gabapentin (NEURONTIN) 300 MG capsule take1 tab am, take 2 tabs pm (Patient taking differently: Take 300 mg by mouth every morning. take1 tab am, take 2 tabs pm) 90 capsule 0    Ubrogepant (UBRELVY) 50 MG TABS Talk 1 tablet as needed at start of headaches, can repeat in 24 hours 10 tablet 0    insulin aspart (NOVOLOG FLEXPEN) 100 UNIT/ML injection pen Inject 5-12 Units into the skin 3 times daily (before meals) Sugar < 150   Zero Unit  151-200   5 U   201- 250   8 U   251-300    12 U 7 pen 5    gabapentin (NEURONTIN) 600 MG tablet Take 600 mg by mouth nightly.       torsemide (DEMADEX) 10 MG tablet TAKE TWO TABLETS BY MOUTH DAILY 60 tablet 5    Nebulizers (COMPRESSOR/NEBULIZER) MISC 1 Device by Does not apply route 4 times daily as needed (SOB / Wheezing) 1 each 0    albuterol (PROVENTIL) (2.5 MG/3ML) 0.083% nebulizer solution Take 3 mLs by nebulization every 4 hours as needed for Wheezing 120 each 5    hydrALAZINE (APRESOLINE) 50 MG tablet TAKE 1 TABLET BY MOUTH 2 TIMES DAILY 60 tablet 5    Lancet Devices (SIMPLE DIAGNOSTICS LANCING DEV) MISC USE WITH LANCETS TO TEST BLOOD GLUCOSE 1 each 11    Blood Glucose Monitoring Suppl (TRUE METRIX METER) w/Device KIT USE TO TEST BLOOD GLUCOSE 1 kit 0    TRUE METRIX BLOOD GLUCOSE TEST strip USE TO TEST BLOOD GLUCOSE THREE TIMES DAILY 250 each 5    Pharmacist Choice Lancets MISC USE TO TEST BLOOD GLUCOSE THREE TIMES DAILY 300 each 5    UltiCare Alcohol Swabs 70 % PADS USE TO TEST BLOOD GLUCOSE THREE TIMES DAILY 100 each 5    UNIFINE PENTIPS 31G X 8 MM MISC USE WITH insulin pens 100 each 5    BASAGLAR KWIKPEN 100 UNIT/ML injection pen INJECT 60 UNITS INTO THE SKIN 2 TIMES DAILY 15 mL 5

## 2020-07-24 RX ORDER — QUETIAPINE FUMARATE 25 MG/1
25-50 TABLET, FILM COATED ORAL NIGHTLY
Qty: 60 TABLET | Refills: 0 | OUTPATIENT
Start: 2020-07-24

## 2020-07-24 RX ORDER — DIVALPROEX SODIUM 250 MG/1
250 TABLET, EXTENDED RELEASE ORAL 2 TIMES DAILY
Qty: 60 TABLET | Refills: 0 | OUTPATIENT
Start: 2020-07-24

## 2020-07-24 RX ORDER — DULOXETIN HYDROCHLORIDE 60 MG/1
60 CAPSULE, DELAYED RELEASE ORAL DAILY
Qty: 30 CAPSULE | Refills: 0 | OUTPATIENT
Start: 2020-07-24

## 2020-07-24 RX ORDER — GABAPENTIN 300 MG/1
CAPSULE ORAL
Qty: 90 CAPSULE | Refills: 0 | OUTPATIENT
Start: 2020-07-24

## 2020-08-05 RX ORDER — ATORVASTATIN CALCIUM 80 MG/1
80 TABLET, FILM COATED ORAL NIGHTLY
Qty: 90 TABLET | Refills: 5 | Status: SHIPPED | OUTPATIENT
Start: 2020-08-05 | End: 2020-10-01 | Stop reason: SDUPTHER

## 2020-08-13 RX ORDER — RIZATRIPTAN BENZOATE 10 MG/1
10 TABLET ORAL
Qty: 30 TABLET | Refills: 0 | Status: SHIPPED | OUTPATIENT
Start: 2020-08-13 | End: 2020-08-19

## 2020-08-19 ENCOUNTER — VIRTUAL VISIT (OUTPATIENT)
Dept: PAIN MANAGEMENT | Age: 65
End: 2020-08-19
Payer: COMMERCIAL

## 2020-08-19 PROCEDURE — G8427 DOCREV CUR MEDS BY ELIG CLIN: HCPCS | Performed by: INTERNAL MEDICINE

## 2020-08-19 PROCEDURE — 2022F DILAT RTA XM EVC RTNOPTHY: CPT | Performed by: INTERNAL MEDICINE

## 2020-08-19 PROCEDURE — 3046F HEMOGLOBIN A1C LEVEL >9.0%: CPT | Performed by: INTERNAL MEDICINE

## 2020-08-19 PROCEDURE — 99213 OFFICE O/P EST LOW 20 MIN: CPT | Performed by: INTERNAL MEDICINE

## 2020-08-19 PROCEDURE — 3017F COLORECTAL CA SCREEN DOC REV: CPT | Performed by: INTERNAL MEDICINE

## 2020-08-19 RX ORDER — GABAPENTIN 300 MG/1
CAPSULE ORAL
Qty: 90 CAPSULE | Refills: 0 | Status: SHIPPED | OUTPATIENT
Start: 2020-08-19 | End: 2020-09-16

## 2020-08-19 RX ORDER — DIVALPROEX SODIUM 250 MG/1
250 TABLET, EXTENDED RELEASE ORAL 2 TIMES DAILY
Qty: 60 TABLET | Refills: 1 | Status: SHIPPED | OUTPATIENT
Start: 2020-08-19 | End: 2020-09-16 | Stop reason: SDUPTHER

## 2020-08-19 RX ORDER — QUETIAPINE FUMARATE 25 MG/1
25-50 TABLET, FILM COATED ORAL NIGHTLY
Qty: 60 TABLET | Refills: 1 | Status: SHIPPED | OUTPATIENT
Start: 2020-08-19 | End: 2020-09-16 | Stop reason: SDUPTHER

## 2020-08-19 RX ORDER — GABAPENTIN 600 MG/1
600 TABLET ORAL NIGHTLY
Qty: 90 TABLET | Refills: 1 | Status: SHIPPED | OUTPATIENT
Start: 2020-08-19 | End: 2020-09-16 | Stop reason: SDUPTHER

## 2020-08-19 RX ORDER — SUMATRIPTAN 50 MG/1
50 TABLET, FILM COATED ORAL
Qty: 9 TABLET | Refills: 0 | Status: SHIPPED | OUTPATIENT
Start: 2020-08-19 | End: 2020-11-11

## 2020-08-19 RX ORDER — DULOXETIN HYDROCHLORIDE 60 MG/1
60 CAPSULE, DELAYED RELEASE ORAL DAILY
Qty: 30 CAPSULE | Refills: 1 | Status: SHIPPED | OUTPATIENT
Start: 2020-08-19 | End: 2020-09-16 | Stop reason: SDUPTHER

## 2020-08-19 RX ORDER — METHOCARBAMOL 500 MG/1
500 TABLET, FILM COATED ORAL 2 TIMES DAILY
Qty: 60 TABLET | Refills: 1 | Status: SHIPPED
Start: 2020-08-19 | End: 2020-09-03 | Stop reason: ALTCHOICE

## 2020-08-19 RX ORDER — OXYCODONE AND ACETAMINOPHEN 7.5; 325 MG/1; MG/1
1 TABLET ORAL EVERY 6 HOURS PRN
Qty: 84 TABLET | Refills: 0 | Status: SHIPPED | OUTPATIENT
Start: 2020-08-19 | End: 2020-09-16 | Stop reason: SDUPTHER

## 2020-08-19 NOTE — PROGRESS NOTES
TELE HEALTH VISIT (AUDIO-VISUAL)    Pursuant to the emergency declaration under the [facility], [facility] waiver authority and the [employer] and [employer] Act, this Virtual  Visit was conducted, with patient's/legal guardian's consent, to reduce the patient's risk of exposure to COVID-19 and provide continuity of care for an established patient. Service is  provided through a video synchronous discussion virtually to substitute for in-person clinic visit. Due to this being a TeleHealth encounter (During KHUCE-32 public health emergency), evaluation of the following organ systems was limited: Vitals/Constitutional/EENT/Resp/CV/GI//MS/Neuro/Skin/Sisv-Tvnu-Mgq. Chu Smith  1955  [id number]    Ms. Josué Berry is being seen virtually for a follow up visit using one of the following techniques  [employer]   Informed verbal consent to the virtual visit was obtained from Ms. Meza. Risks associated with HIPPA compliance with the virtual visit was explained to the patient. Ms. Josué Berry is at her residence and Dr. Kleber Maddox is in his office. HISTORY OF PRESENT ILLNESS:  Ms. Josué Berry is a 62 y.o. female  being assessed for a follow up visit for pain management for evaluation of ongoing care regarding her symptoms and monitoring of compliance with long term use high risk medications. She has a diagnosis of   1. Chronic pain syndrome    2. DDD (degenerative disc disease), lumbar    3. Fibromyalgia    4. DDD (degenerative disc disease), cervical    .      She complains of pain in the Low back   with radiation to the buttocks, hips Bilateral, upper leg Bilateral, knees Bilateral, lower leg Bilateral, ankles Bilateral and feet Bilateral . She rates the pain 6/10 and describes it as sharp, aching, burning, numbness, pins and needles. Current treatment regimen has helped relieve about 60% of the pain.   She denies any side effects from the current metoprolol succinate (TOPROL XL) 50 MG extended release tablet Take 0.5 tablets by mouth 2 times daily (with meals) 30 tablet 5    amLODIPine (NORVASC) 5 MG tablet Take 1 tablet by mouth daily 90 tablet 5    aspirin (ASPIRIN LOW DOSE) 81 MG EC tablet Take 1 tablet by mouth daily 90 tablet 5    isosorbide mononitrate (IMDUR) 30 MG extended release tablet Take 1 tablet by mouth daily 90 tablet 5    nitroGLYCERIN (NITROSTAT) 0.4 MG SL tablet Place 1 tablet under the tongue every 5 minutes as needed for Chest pain up to max of 3 total doses. If no relief after 1 dose, call 911. 25 tablet 3    rizatriptan (MAXALT) 10 MG tablet Take 1 tablet by mouth once as needed for Migraine May repeat in 2 hours if needed 30 tablet 0    gabapentin (NEURONTIN) 300 MG capsule take1 tab am, take 2 tabs pm (Patient taking differently: Take 300 mg by mouth every morning. take1 tab am, take 2 tabs pm) 90 capsule 0     No facility-administered medications prior to visit. SOCIAL/FAMILY/PAST MEDICAL HISTORY: Ms. Elvira Mejia, family and past medical history was reviewed. REVIEW OF SYSTEMS:    Respiratory: Negative for apnea, chest tightness and shortness of breath or change in baseline breathing. Gastrointestinal: Negative for nausea, vomiting, abdominal pain, diarrhea, constipation, blood in stool and abdominal distention. PHYSICAL EXAM:   Nursing note and vitals per patient reviewed. There were no vitals taken for this visit. Constitutional: She appears well-developed and well-nourished. No acute distress. No respiratory distress. Skin: Skin appears to be warm and dry. No rashes or any other marks noted. She is not diaphoretic. Respiratory/Pulmonary: NO conversational dyspnea, no accessory muscle use, no coughing during exam. She does not appear to be in labored breathing. Neurological/Psychiatric:She is alert and oriented to person, place, and time.  Coordination is  normal.  Her mood isAppropriate and affect is Neutral/Euthymic(normal). Musculoskeletal / Extremities: Range of motion is normal. Gait is normal, assistive devices use: none. IMPRESSION:   1. Chronic pain syndrome    2. DDD (degenerative disc disease), lumbar    3. Fibromyalgia    4. DDD (degenerative disc disease), cervical        PLAN:  Informed verbal consent regarding treatment was obtained  -Continue with current opioid regimen Butrans with Norco 2 per day   -She was advised to increase fluids ( 5-7  glasses of fluid daily), limit caffeine, avoid cheese products, increase dietary fiber, increase activity and exercise as tolerated and relax regularly and enjoy meals   -Continue with Mirapex for RLS   -Adv Biofeedback, relaxation and meditation techniques. Referral to psychologist for CBT was also discussed with patient   -Back exercises given    Current Outpatient Medications   Medication Sig Dispense Refill    atorvastatin (LIPITOR) 80 MG tablet Take 1 tablet by mouth nightly 90 tablet 5    QUEtiapine (SEROQUEL) 25 MG tablet Take 1-2 tablets by mouth nightly 60 tablet 1    DULoxetine (CYMBALTA) 60 MG extended release capsule Take 1 capsule by mouth daily 30 capsule 1    oxyCODONE-acetaminophen (PERCOCET) 7.5-325 MG per tablet Take 1 tablet by mouth every 6 hours as needed for Pain (max 3/day) for up to 28 days. 84 tablet 0    methocarbamol (ROBAXIN) 500 MG tablet Take 1 tablet by mouth 2 times daily 60 tablet 1    divalproex (DEPAKOTE ER) 250 MG extended release tablet Take 1 tablet by mouth 2 times daily 60 tablet 1    gabapentin (NEURONTIN) 600 MG tablet Take 1 tablet by mouth nightly for 30 days.  90 tablet 1    hydrALAZINE (APRESOLINE) 50 MG tablet TAKE 1 TABLET BY MOUTH 2 TIMES DAILY 60 tablet 5    pantoprazole (PROTONIX) 40 MG tablet Take 1 tablet by mouth every morning (before breakfast) 30 tablet 3    butalbital-acetaminophen-caffeine (FIORICET, ESGIC) -40 MG per tablet Take 1 tablet by mouth every 6 hours as needed for Headaches 50 tablet 0    ondansetron (ZOFRAN) 4 MG tablet Take 1 tablet by mouth 3 times daily as needed for Nausea or Vomiting 30 tablet 0    insulin aspart (NOVOLOG FLEXPEN) 100 UNIT/ML injection pen Inject 5-12 Units into the skin 3 times daily (before meals) Sugar < 150   Zero Unit  151-200   5 U   201- 250   8 U   251-300    12 U 7 pen 5    torsemide (DEMADEX) 10 MG tablet TAKE TWO TABLETS BY MOUTH DAILY 60 tablet 5    Nebulizers (COMPRESSOR/NEBULIZER) MISC 1 Device by Does not apply route 4 times daily as needed (SOB / Wheezing) 1 each 0    albuterol (PROVENTIL) (2.5 MG/3ML) 0.083% nebulizer solution Take 3 mLs by nebulization every 4 hours as needed for Wheezing 120 each 5    Lancet Devices (SIMPLE DIAGNOSTICS LANCING DEV) MISC USE WITH LANCETS TO TEST BLOOD GLUCOSE 1 each 11    Blood Glucose Monitoring Suppl (TRUE METRIX METER) w/Device KIT USE TO TEST BLOOD GLUCOSE 1 kit 0    TRUE METRIX BLOOD GLUCOSE TEST strip USE TO TEST BLOOD GLUCOSE THREE TIMES DAILY 250 each 5    Pharmacist Choice Lancets MISC USE TO TEST BLOOD GLUCOSE THREE TIMES DAILY 300 each 5    UltiCare Alcohol Swabs 70 % PADS USE TO TEST BLOOD GLUCOSE THREE TIMES DAILY 100 each 5    UNIFINE PENTIPS 31G X 8 MM MISC USE WITH insulin pens 100 each 5    BASAGLAR KWIKPEN 100 UNIT/ML injection pen INJECT 60 UNITS INTO THE SKIN 2 TIMES DAILY 15 mL 5    budesonide-formoterol (SYMBICORT) 160-4.5 MCG/ACT AERO Inhale 2 puffs into the lungs daily 2 Inhaler 5    albuterol sulfate HFA (VENTOLIN HFA) 108 (90 Base) MCG/ACT inhaler Inhale 2 puffs into the lungs every 4 hours as needed for Wheezing or Shortness of Breath 3 Inhaler 5    ranolazine (RANEXA) 1000 MG extended release tablet Take 1 tablet by mouth 2 times daily 60 tablet 8    clopidogrel (PLAVIX) 75 MG tablet TAKE 1 TABLET BY MOUTH DA JULIEN 90 tablet 5    metoprolol succinate (TOPROL XL) 50 MG extended release tablet Take 0.5 tablets by mouth 2 times daily (with meals) 30 tablet 5    amLODIPine (NORVASC) 5 MG tablet Take 1 tablet by mouth daily 90 tablet 5    aspirin (ASPIRIN LOW DOSE) 81 MG EC tablet Take 1 tablet by mouth daily 90 tablet 5    isosorbide mononitrate (IMDUR) 30 MG extended release tablet Take 1 tablet by mouth daily 90 tablet 5    nitroGLYCERIN (NITROSTAT) 0.4 MG SL tablet Place 1 tablet under the tongue every 5 minutes as needed for Chest pain up to max of 3 total doses. If no relief after 1 dose, call 911. 25 tablet 3    rizatriptan (MAXALT) 10 MG tablet Take 1 tablet by mouth once as needed for Migraine May repeat in 2 hours if needed 30 tablet 0    gabapentin (NEURONTIN) 300 MG capsule take1 tab am, take 2 tabs pm (Patient taking differently: Take 300 mg by mouth every morning. take1 tab am, take 2 tabs pm) 90 capsule 0     No current facility-administered medications for this visit. I will continue her current medication regimen  which is part of the above treatment schedule. It has been helping with Ms. Meza's chronic  medical problems which for this visit include:   Diagnoses of Chronic pain syndrome, DDD (degenerative disc disease), lumbar, Fibromyalgia, and DDD (degenerative disc disease), cervical were pertinent to this visit. Risks and benefits of the medications and other alternative treatments  including no treatment were discussed with the patient. The common side effects of these medications were also explained to the patient. Informed verbal consent was obtained. Goals of current treatment regimen include improvement in pain, restoration of functioning- with focus on improvement in physical performance, general activity, work or disability,emotional distress, health care utilization and  decreased medication consumption. Will continue to monitor progress towards achieving/maintaining therapeutic goals with special emphasis on  1. Improvement in perceived interfernce  of pain with ADL's. Ability to do home exercises independently.  Ability to do household chores indoor and/or outdoor work and social and leisure activities. Improve psychosocial and physical functioning. - she is showing progression towards this treatment goal with the current regimen. 2. Ability to focus/concentrate at work and perform the duties required of her at work  Sit through a workday without lower extremity symptoms. Stand 30-60 minutes without lower extremity symptoms. Ability to lift up to 10-20 lbs. Ability to go up and down stairs. Sit 30-60 minutes  Without having to stand up frequently. - she is maintaining/progressing towards her work related goals with the current regimen. She was advised against drinking alcohol with the narcotic pain medicines, advised against driving or handling machinery while adjusting the dose of medicines or if having cognitive  issues related to the current medications. Risk of overdose and death, if medicines not taken as prescribed, were also discussed. If the patient develops new symptoms or if the symptoms worsen, the patient should call the office. While transcribing every attempt was made to maintain the accuracy of the note in terms of it's contents,there may have been some errors made inadvertently. Thank you for allowing me to participate in the care of this patient.     Dawna Denny MD.    Cc: Kelsey Cheek MD

## 2020-08-27 RX ORDER — ALCOHOL ANTISEPTIC PADS
PADS, MEDICATED (EA) TOPICAL
Qty: 100 EACH | Refills: 5 | Status: SHIPPED | OUTPATIENT
Start: 2020-08-27 | End: 2021-03-01

## 2020-09-01 ENCOUNTER — TELEPHONE (OUTPATIENT)
Dept: PAIN MANAGEMENT | Age: 65
End: 2020-09-01

## 2020-09-01 NOTE — TELEPHONE ENCOUNTER
Patient states Rx for methocarbamol (ROBAXIN) 500 MG tablet is not helping with her pain level and requesting something stronger due to having to travel out of town for  and is worried she will be in pain while traveling.  [facility]

## 2020-09-03 RX ORDER — TIZANIDINE 4 MG/1
TABLET ORAL
Qty: 60 TABLET | Refills: 0 | Status: SHIPPED | OUTPATIENT
Start: 2020-09-03 | End: 2020-09-16 | Stop reason: SDUPTHER

## 2020-09-04 NOTE — TELEPHONE ENCOUNTER
Called patient to advise her that per [facility] she cant change to Zanaflex 4 mg, take 1/2-1 tablet po BID, # 60.  Patient did not answer, LVM, Rx was sent to the pharmacy

## 2020-09-14 RX ORDER — TORSEMIDE 10 MG/1
TABLET ORAL
Qty: 60 TABLET | Refills: 5 | Status: SHIPPED | OUTPATIENT
Start: 2020-09-14 | End: 2020-10-01 | Stop reason: SDUPTHER

## 2020-09-16 ENCOUNTER — OFFICE VISIT (OUTPATIENT)
Dept: PAIN MANAGEMENT | Age: 65
End: 2020-09-16
Payer: COMMERCIAL

## 2020-09-16 VITALS
DIASTOLIC BLOOD PRESSURE: 84 MMHG | BODY MASS INDEX: 25.39 KG/M2 | WEIGHT: 130 LBS | SYSTOLIC BLOOD PRESSURE: 169 MMHG | TEMPERATURE: 97.4 F | HEART RATE: 82 BPM

## 2020-09-16 PROCEDURE — G8419 CALC BMI OUT NRM PARAM NOF/U: HCPCS | Performed by: INTERNAL MEDICINE

## 2020-09-16 PROCEDURE — 3046F HEMOGLOBIN A1C LEVEL >9.0%: CPT | Performed by: INTERNAL MEDICINE

## 2020-09-16 PROCEDURE — 2022F DILAT RTA XM EVC RTNOPTHY: CPT | Performed by: INTERNAL MEDICINE

## 2020-09-16 PROCEDURE — 3017F COLORECTAL CA SCREEN DOC REV: CPT | Performed by: INTERNAL MEDICINE

## 2020-09-16 PROCEDURE — 1036F TOBACCO NON-USER: CPT | Performed by: INTERNAL MEDICINE

## 2020-09-16 PROCEDURE — G8427 DOCREV CUR MEDS BY ELIG CLIN: HCPCS | Performed by: INTERNAL MEDICINE

## 2020-09-16 PROCEDURE — 99214 OFFICE O/P EST MOD 30 MIN: CPT | Performed by: INTERNAL MEDICINE

## 2020-09-16 RX ORDER — UBROGEPANT 50 MG/1
TABLET ORAL
Qty: 10 TABLET | Refills: 1 | Status: SHIPPED | OUTPATIENT
Start: 2020-09-16 | End: 2020-10-30

## 2020-09-16 RX ORDER — ERENUMAB-AOOE 70 MG/ML
140 INJECTION SUBCUTANEOUS
Qty: 1 PEN | Refills: 0 | Status: SHIPPED | OUTPATIENT
Start: 2020-09-16 | End: 2020-10-14 | Stop reason: SDUPTHER

## 2020-09-16 RX ORDER — DULOXETIN HYDROCHLORIDE 60 MG/1
60 CAPSULE, DELAYED RELEASE ORAL DAILY
Qty: 30 CAPSULE | Refills: 1 | Status: SHIPPED | OUTPATIENT
Start: 2020-09-16 | End: 2020-11-11 | Stop reason: SDUPTHER

## 2020-09-16 RX ORDER — QUETIAPINE FUMARATE 25 MG/1
25-50 TABLET, FILM COATED ORAL NIGHTLY
Qty: 60 TABLET | Refills: 1 | Status: SHIPPED | OUTPATIENT
Start: 2020-09-16 | End: 2020-11-11 | Stop reason: SDUPTHER

## 2020-09-16 RX ORDER — OXYCODONE AND ACETAMINOPHEN 7.5; 325 MG/1; MG/1
1 TABLET ORAL EVERY 6 HOURS PRN
Qty: 84 TABLET | Refills: 0 | Status: SHIPPED | OUTPATIENT
Start: 2020-09-16 | End: 2020-10-14 | Stop reason: SDUPTHER

## 2020-09-16 RX ORDER — TIZANIDINE 4 MG/1
TABLET ORAL
Qty: 60 TABLET | Refills: 0 | Status: SHIPPED | OUTPATIENT
Start: 2020-09-16 | End: 2020-10-14 | Stop reason: SDUPTHER

## 2020-09-16 RX ORDER — GABAPENTIN 600 MG/1
600 TABLET ORAL NIGHTLY
Qty: 90 TABLET | Refills: 1 | Status: SHIPPED | OUTPATIENT
Start: 2020-09-16 | End: 2020-11-11

## 2020-09-16 RX ORDER — DIVALPROEX SODIUM 250 MG/1
250 TABLET, EXTENDED RELEASE ORAL 2 TIMES DAILY
Qty: 60 TABLET | Refills: 1 | Status: SHIPPED | OUTPATIENT
Start: 2020-09-16 | End: 2020-10-14 | Stop reason: SDUPTHER

## 2020-09-16 NOTE — PROGRESS NOTES
[facility]  1955  [id number]    HISTORY OF PRESENT ILLNESS:  Ms. Ivy Garcia is a 62 y.o. female returns for a follow up visit for multiple medical problems. Her  presenting problems are   1. Chronic pain syndrome    2. DDD (degenerative disc disease), lumbar    3. Fibromyalgia    4. DDD (degenerative disc disease), cervical    5. Chronic painful diabetic neuropathy ([facility] [address]    6. Rotator cuff tendonitis, right    7. Intractable migraine with aura without status migrainosus    8. Primary insomnia    9. Moderate episode of recurrent major depressive disorder ([address]    10. Intractable migraine with aura with status migrainosus    11. Gastro-esophageal reflux disease without esophagitis    12. Tendinitis of right rotator cuff    . As per information/history obtained from the PADT(patient assessment and documentation tool) -  She complains of pain in the lower back with radiation to the buttocks, hips Bilateral, upper leg Bilateral, knees Bilateral, lower leg Bilateral, ankles Bilateral and feet Bilateral She rates the pain 5/10 and describes it as sharp, aching, burning. Pain is made worse by: movement, walking, standing, sitting, bending, lifting. Current treatment regimen has helped relieve about 30% of the pain. She denies side effects from the current pain regimen. Patient reports that since the last follow up visit the physical functioning is unchanged, family/social relationships are unchanged, mood is unchanged and sleep patterns are unchanged, and that the overall functioning is unchanged. Patient denies neurological bowel or bladder. Patient denies misusing/abusing her narcotic pain medications or using any illegal drugs. There are No indicators for possible drug abuse, addiction or diversion problems. Upon obtaining the medical history from Ms. Ivy Garcia regarding today's office visit for her presenting problems, patient complains she is has been having increase headaches, \" they are terrible\".  She mentions the neurologist told her it was because of the CVA. She reports her back and legs hurt also. She reports she is using Percocet 3 per day. She says she is using IMitrex and Zanaflex. She states she has bene using Cymbalta and Neurontin 600 mg Patient states her sleep is fair. Has fairly normal sleep latency. Averages about 4-6 hours of sleep a night. Denies any signs of sleep apnea. Feels somewhat rested in the morning. She mentions she is using Seroquel. Patient's  subjective report of her mood is fair. she describes occasional symptoms of depression, occasional  irritability and some mood swings. Describes her mood as being neutral and reports some pleasure in her daily activities. Reports  fair  appetite, energy and concentration. Able to function well in different aspects of her daily activities. Denies suicidal or homicidal ideation. Denies any complaints of increased tension, does   Worry sometimes and occasional  irritability  she denies any c/o increased anxiety, No c/o panic attacks or symptoms of PTSD She states she wants opioid increased       ALLERGIES/PAST MED/FAM/SOC HISTORY: Ms. Dorie Plummer allergies, past medical, family and social history were reviewed in the chart and also listed below. Social History     Socioeconomic History    Marital status:       Spouse name: Not on file    Number of children: 6    Years of education: Not on file    Highest education level: Not on file   Occupational History    Not on file   Social Needs    Financial resource strain: Not on file    Food insecurity     Worry: Not on file     Inability: Not on file    Transportation needs     Medical: Not on file     Non-medical: Not on file   Tobacco Use    Smoking status: Former Smoker     Packs/day: 0.50     Years: 35.00     Pack years: 17.50     Types: Cigarettes     Last attempt to quit: 2018     Years since quittin.2    Smokeless tobacco: Former User    Tobacco comment: 5/13/15 has not smoked since hospitalization -    Substance and Sexual Activity    Alcohol use: No     Alcohol/week: 0.0 standard drinks    Drug use: No    Sexual activity: Yes     Partners: Male   Lifestyle    Physical activity     Days per week: Not on file     Minutes per session: Not on file    Stress: Not on file   Relationships    Social connections     Talks on phone: Not on file     Gets together: Not on file     Attends Synagogue service: Not on file     Active member of club or organization: Not on file     Attends meetings of clubs or organizations: Not on file     Relationship status: Not on file    Intimate partner violence     Fear of current or ex partner: Not on file     Emotionally abused: Not on file     Physically abused: Not on file     Forced sexual activity: Not on file   Other Topics Concern    Not on file   Social History Narrative    Not on file       Ms. Meza current medications are   Outpatient Medications Prior to Visit   Medication Sig Dispense Refill    torsemide (DEMADEX) 10 MG tablet TAKE TWO TABLETS BY MOUTH DAILY 60 tablet 5    tiZANidine (ZANAFLEX) 4 MG tablet Take 1/2-1 tablet po BID 60 tablet 0    UltiCare Alcohol Swabs 70 % PADS USE TO TEST BLOOD GLUCOSE THREE TIMES DAILY 100 each 5    QUEtiapine (SEROQUEL) 25 MG tablet Take 1-2 tablets by mouth nightly 60 tablet 1    DULoxetine (CYMBALTA) 60 MG extended release capsule Take 1 capsule by mouth daily 30 capsule 1    oxyCODONE-acetaminophen (PERCOCET) 7.5-325 MG per tablet Take 1 tablet by mouth every 6 hours as needed for Pain (max 3-4 day) for up to 28 days. 84 tablet 0    divalproex (DEPAKOTE ER) 250 MG extended release tablet Take 1 tablet by mouth 2 times daily 60 tablet 1    gabapentin (NEURONTIN) 600 MG tablet Take 1 tablet by mouth nightly for 30 days.  90 tablet 1    SUMAtriptan (IMITREX) 50 MG tablet Take 1 tablet by mouth once as needed for Migraine 9 tablet 0    gabapentin (NEURONTIN) 300 MG capsule take1 tab am, take 2 tabs pm 90 capsule 0    atorvastatin (LIPITOR) 80 MG tablet Take 1 tablet by mouth nightly 90 tablet 5    hydrALAZINE (APRESOLINE) 50 MG tablet TAKE 1 TABLET BY MOUTH 2 TIMES DAILY 60 tablet 5    pantoprazole (PROTONIX) 40 MG tablet Take 1 tablet by mouth every morning (before breakfast) 30 tablet 3    ondansetron (ZOFRAN) 4 MG tablet Take 1 tablet by mouth 3 times daily as needed for Nausea or Vomiting 30 tablet 0    insulin aspart (NOVOLOG FLEXPEN) 100 UNIT/ML injection pen Inject 5-12 Units into the skin 3 times daily (before meals) Sugar < 150   Zero Unit  151-200   5 U   201- 250   8 U   251-300    12 U 7 pen 5    Nebulizers (COMPRESSOR/NEBULIZER) MISC 1 Device by Does not apply route 4 times daily as needed (SOB / Wheezing) 1 each 0    albuterol (PROVENTIL) (2.5 MG/3ML) 0.083% nebulizer solution Take 3 mLs by nebulization every 4 hours as needed for Wheezing 120 each 5    Lancet Devices (SIMPLE DIAGNOSTICS LANCING DEV) MISC USE WITH LANCETS TO TEST BLOOD GLUCOSE 1 each 11    Blood Glucose Monitoring Suppl (TRUE METRIX METER) w/Device KIT USE TO TEST BLOOD GLUCOSE 1 kit 0    TRUE METRIX BLOOD GLUCOSE TEST strip USE TO TEST BLOOD GLUCOSE THREE TIMES DAILY 250 each 5    Pharmacist Choice Lancets MISC USE TO TEST BLOOD GLUCOSE THREE TIMES DAILY 300 each 5    UNIFINE PENTIPS 31G X 8 MM MISC USE WITH insulin pens 100 each 5    BASAGLAR KWIKPEN 100 UNIT/ML injection pen INJECT 60 UNITS INTO THE SKIN 2 TIMES DAILY 15 mL 5    budesonide-formoterol (SYMBICORT) 160-4.5 MCG/ACT AERO Inhale 2 puffs into the lungs daily 2 Inhaler 5    albuterol sulfate HFA (VENTOLIN HFA) 108 (90 Base) MCG/ACT inhaler Inhale 2 puffs into the lungs every 4 hours as needed for Wheezing or Shortness of Breath 3 Inhaler 5    ranolazine (RANEXA) 1000 MG extended release tablet Take 1 tablet by mouth 2 times daily 60 tablet 8    clopidogrel (PLAVIX) 75 MG tablet TAKE 1 TABLET BY MOUTH DA JULIEN 90 tablet 5    metoprolol succinate (TOPROL XL) 50 MG extended release tablet Take 0.5 tablets by mouth 2 times daily (with meals) 30 tablet 5    amLODIPine (NORVASC) 5 MG tablet Take 1 tablet by mouth daily 90 tablet 5    aspirin (ASPIRIN LOW DOSE) 81 MG EC tablet Take 1 tablet by mouth daily 90 tablet 5    isosorbide mononitrate (IMDUR) 30 MG extended release tablet Take 1 tablet by mouth daily 90 tablet 5    nitroGLYCERIN (NITROSTAT) 0.4 MG SL tablet Place 1 tablet under the tongue every 5 minutes as needed for Chest pain up to max of 3 total doses. If no relief after 1 dose, call 911. 25 tablet 3     No facility-administered medications prior to visit. REVIEW OF SYSTEMS:   Constitutional: Negative for fatigue and unexpected weight change. Eyes: Negative for visual disturbance. Respiratory: Negative for shortness of breath. Cardiovascular: Negative for chest pain, palpitations  Gastrointestinal: Negative for blood in stool, abdominal distention, nausea, vomiting, abdominal pain, diarrhea,constipation. Skin: Negative for color change or any abnormal bruising. Neurological: Negative for speech difficulty, weakness and light-headedness, dizziness, tremors, sleepiness  Psychiatric/Behavioral: Negative for suicidal ideas, hallucinations, behavioral problems, self-injury, decreased concentration/cognition, agitation, confusion. PHYSICAL EXAM:   Nursing note and vitals reviewed. BP (!) 169/84   Pulse 82   Temp 97.4 °F (36.3 °C) (Oral)   Wt 130 lb (59 kg)   BMI 25.39 kg/m²   General Appearance: Patient is well nourished, well developed, well groomed and in no acute distress. Skin: Skin is warm and dry, good turgor . No rash or lesions noted. She is not diaphoretic. Pulmonary/Chest: Effort normal. No respiratory distress or use of accessory muscles. Auscultation revealing normal air entry. She does not have wheezes in the lung fields. She has no rales.    Cardiovascular: Normal rate, regular rhythm, normal heart sounds, and does not have murmur. Exam reveals no gallop and no friction rub. Abdominal: Soft. Bowel sounds are normal.  On inspection of abdomen is flat no distension and no mass. No tenderness. She has no rebound and no guarding. Musculoskeletal / Extremities: Range of motion is normal. Gait is normal, assistive devices use: none. She exhibits edema: none, and no tenderness. Neurological/Psychiatric:She is alert and oriented to person, place, and time. Coordination is  normal.   Judgement and Insight is normal  Her mood is Appropriate and affect is Flat/blunted . Her behavior is normal.   thought content normal.        IMPRESSION:     1. Intractable migraine with aura without status migrainosus - INADEQUATELY CONTROLLED: treatment plan adjusted as below    2. Chronic pain syndrome - INADEQUATELY CONTROLLED: treatment plan adjusted as below    3. DDD (degenerative disc disease), lumbar - STABLE: Continue current treatment plan    4. Fibromyalgia - STABLE: Continue current treatment plan    5. DDD (degenerative disc disease), cervical - STABLE: Continue current treatment plan    6. Chronic painful diabetic neuropathy (HCC) - STABLE: Continue current treatment plan    7. Primary insomnia - STABLE: Continue current treatment plan    8. Moderate episode of recurrent major depressive disorder (HCC) - STABLE: Continue current treatment plan - STABLE: Continue current treatment plan    9. Gastro-esophageal reflux disease without esophagitis - STABLE: Continue current treatment plan - STABLE: Continue current treatment plan    10.  Rotator cuff tendonitis, right - STABLE: Continue current treatment plan        PLAN:  Informed verbal consent was obtained.  -Continue with current opioid regimen increase Percocet 3 per day   -increase depakote ER to 500 mg BID   -Decrease Neurontin to 600 mg only at night   -Start Aimovig 140 mg s/c monthly   -Discontinue Imitrex, having side effects   -Continue with Zanaflex   -she was advised  to avoid using too many OTC analgesics to control the headaches, avoid chocolates, increased caffeine, cheeses and MSG nitrite containing foods, cigarette smoking. To avoid bright lights, strong smells and skipping meals. -CBT techniques- relaxation therapies such as biofeedback, mindfulness based stress reduction, imagery, cognitive restructuring, problem solving discussed with patient   -Continue with Cymbalta   -Start Damián shahid as needed   -Urine drug screen with GC/MS for opiates and drugs of abuse was ordered and will follow up on results.   -She was advised to increase fluids ( 5-7  glasses of fluid daily), limit caffeine, avoid cheese products, increase dietary fiber, increase activity and exercise as tolerated and relax regularly and enjoy meals   -she was advised proper sleep hygiene-told to avoid:use of caffeine or other stimulants after noon, alcohol use near bedtime, long or frequent naps during the day, erratic sleep schedule, heavy meals near bedtime, vigorous exercise near bedtime and use of electronic devices near bedtime   Ms. Cristóbal Isaac will be prescribed  the medications  listed below which are for treatment of her presenting  medical problems which for this visit include: The primary encounter diagnosis was Chronic pain syndrome. Diagnoses of DDD (degenerative disc disease), lumbar, Fibromyalgia, DDD (degenerative disc disease), cervical, Chronic painful diabetic neuropathy ([address] Rotator cuff tendonitis, right, Intractable migraine with aura without status migrainosus, Primary insomnia, Moderate episode of recurrent major depressive disorder ([address] Intractable migraine with aura with status migrainosus, Gastro-esophageal reflux disease without esophagitis, and Tendinitis of right rotator cuff were also pertinent to this visit.   Medications/orders associated with this visit:    Current Outpatient Medications   Medication Sig Dispense Refill    torsemide (DEMADEX) 10 MG tablet TAKE TWO TABLETS BY MOUTH DAILY 60 tablet 5    tiZANidine (ZANAFLEX) 4 MG tablet Take 1/2-1 tablet po BID 60 tablet 0    UltiCare Alcohol Swabs 70 % PADS USE TO TEST BLOOD GLUCOSE THREE TIMES DAILY 100 each 5    QUEtiapine (SEROQUEL) 25 MG tablet Take 1-2 tablets by mouth nightly 60 tablet 1    DULoxetine (CYMBALTA) 60 MG extended release capsule Take 1 capsule by mouth daily 30 capsule 1    oxyCODONE-acetaminophen (PERCOCET) 7.5-325 MG per tablet Take 1 tablet by mouth every 6 hours as needed for Pain (max 3-4 day) for up to 28 days. 84 tablet 0    divalproex (DEPAKOTE ER) 250 MG extended release tablet Take 1 tablet by mouth 2 times daily 60 tablet 1    gabapentin (NEURONTIN) 600 MG tablet Take 1 tablet by mouth nightly for 30 days.  90 tablet 1    SUMAtriptan (IMITREX) 50 MG tablet Take 1 tablet by mouth once as needed for Migraine 9 tablet 0    atorvastatin (LIPITOR) 80 MG tablet Take 1 tablet by mouth nightly 90 tablet 5    hydrALAZINE (APRESOLINE) 50 MG tablet TAKE 1 TABLET BY MOUTH 2 TIMES DAILY 60 tablet 5    pantoprazole (PROTONIX) 40 MG tablet Take 1 tablet by mouth every morning (before breakfast) 30 tablet 3    ondansetron (ZOFRAN) 4 MG tablet Take 1 tablet by mouth 3 times daily as needed for Nausea or Vomiting 30 tablet 0    insulin aspart (NOVOLOG FLEXPEN) 100 UNIT/ML injection pen Inject 5-12 Units into the skin 3 times daily (before meals) Sugar < 150   Zero Unit  151-200   5 U   201- 250   8 U   251-300    12 U 7 pen 5    Nebulizers (COMPRESSOR/NEBULIZER) MISC 1 Device by Does not apply route 4 times daily as needed (SOB / Wheezing) 1 each 0    albuterol (PROVENTIL) (2.5 MG/3ML) 0.083% nebulizer solution Take 3 mLs by nebulization every 4 hours as needed for Wheezing 120 each 5    Lancet Devices (SIMPLE DIAGNOSTICS LANCING DEV) MISC USE WITH LANCETS TO TEST BLOOD GLUCOSE 1 each 11    Blood Glucose Monitoring Suppl (TRUE METRIX METER) w/Device KIT USE TO TEST BLOOD GLUCOSE 1 kit 0    TRUE METRIX BLOOD GLUCOSE TEST strip USE TO TEST BLOOD GLUCOSE THREE TIMES DAILY 250 each 5    Pharmacist Choice Lancets MISC USE TO TEST BLOOD GLUCOSE THREE TIMES DAILY 300 each 5    UNIFINE PENTIPS 31G X 8 MM MISC USE WITH insulin pens 100 each 5    BASAGLAR KWIKPEN 100 UNIT/ML injection pen INJECT 60 UNITS INTO THE SKIN 2 TIMES DAILY 15 mL 5    budesonide-formoterol (SYMBICORT) 160-4.5 MCG/ACT AERO Inhale 2 puffs into the lungs daily 2 Inhaler 5    albuterol sulfate HFA (VENTOLIN HFA) 108 (90 Base) MCG/ACT inhaler Inhale 2 puffs into the lungs every 4 hours as needed for Wheezing or Shortness of Breath 3 Inhaler 5    ranolazine (RANEXA) 1000 MG extended release tablet Take 1 tablet by mouth 2 times daily 60 tablet 8    clopidogrel (PLAVIX) 75 MG tablet TAKE 1 TABLET BY MOUTH DA JULIEN 90 tablet 5    metoprolol succinate (TOPROL XL) 50 MG extended release tablet Take 0.5 tablets by mouth 2 times daily (with meals) 30 tablet 5    amLODIPine (NORVASC) 5 MG tablet Take 1 tablet by mouth daily 90 tablet 5    aspirin (ASPIRIN LOW DOSE) 81 MG EC tablet Take 1 tablet by mouth daily 90 tablet 5    isosorbide mononitrate (IMDUR) 30 MG extended release tablet Take 1 tablet by mouth daily 90 tablet 5    nitroGLYCERIN (NITROSTAT) 0.4 MG SL tablet Place 1 tablet under the tongue every 5 minutes as needed for Chest pain up to max of 3 total doses. If no relief after 1 dose, call 911. 25 tablet 3     No current facility-administered medications for this visit. Goals of current treatment regimen include improvement in pain, restoration of functioning- with focus on improvement in physical performance, general activity, work or disability,emotional distress, health care utilization and  decreased medication consumption. Will continue to monitor progress towards achieving/maintaining therapeutic goals with special emphasis on  1. Improvement in perceived interfernce  of pain with ADL's.  Ability to do home exercises independently. Ability to do household chores indoor and/or outdoor work and social and leisure activities. To increase flexibility/ROM, strength and endurance. Improve psychosocial and physical functioning.- she is not showing any significant progress/or showing regression  towards this goal and reassessment and adjustment of goals/treatment have been made. 2. Improving sleep to 6-7 hours a night. Improve mood/ anxiety and depression symptoms such as crying spells, low energy, problems with concentration, motivation. - she is showing progression towards this treatment goal with the current regimen. 3. Reduction of reliance on opioid analgesia/more appropriate opioid use. - she is showing progression towards this treatment goal with the current regimen. Risks and benefits of the medications and other alternative treatments have been/were  discussed with the patient. Any questions on the  common side effects of these medications were also answered. She was advised against drinking alcohol with the narcotic pain medicines, advised against driving or handling machinery when  starting or adjusting the dose of medicines, feeling groggy or drowsy, or if having any cognitive issues related to the current medications. Sheis fully aware of the risk of overdose and death, if medicines are misused and not taken as prescribed. If she develops new symptoms or if the symptoms worsen, she was told to call the office. .  Thank you for allowing me to participate in the care of this patient.     Jenna Nyhan, MD.    Cc: Astrid Abel MD

## 2020-09-17 ENCOUNTER — TELEPHONE (OUTPATIENT)
Dept: PAIN MANAGEMENT | Age: 65
End: 2020-09-17

## 2020-09-17 RX ORDER — CLOPIDOGREL BISULFATE 75 MG/1
TABLET ORAL
Qty: 90 TABLET | Refills: 5 | Status: SHIPPED | OUTPATIENT
Start: 2020-09-17 | End: 2021-11-02

## 2020-09-17 RX ORDER — ASPIRIN 81 MG/1
TABLET, COATED ORAL
Qty: 90 TABLET | Refills: 5 | Status: SHIPPED | OUTPATIENT
Start: 2020-09-17 | End: 2020-12-22

## 2020-09-21 NOTE — PROGRESS NOTES
Your Child's Health  9-10 Year-Old Visit      Papa Rodriguez  September 21, 2020    Visit Vitals  /68   Ht 4' 8.5\" (1.435 m)   Wt 36.3 kg   BMI 17.62 kg/m²     Weight: 80 lbs      YOUR CHILD'S 9 to 10 YEAR-OLD VISIT    School and Development  How your child performs in school is reflective of all aspects of their development - social skills, language skills, cognitive skills, emotional growth. Some children who have done well in the early grades may start showing learning difficulties later in elementary school as the academics become more challenging. Family stressors, depression, and bullying can affect school performance. It is important to keep in touch with teachers and to investigate if there is a significant change in school performance. If your child receives any services through an IEP, make sure that the plan is updated regularly. Making sure your child has a good night’s sleep and a healthy breakfast each morning continues to be an important part of helping them be successful at school. Children should be becoming more independent in completing homework and school-related tasks. It is still your job to know what their assignments are and provide a distraction-free setting for them to complete their work. While some afterschool activities are good for your child's self-esteem, independence, and peer relationships (and fun!), be careful not to overschedule them. They still need some unstructured downtime and family time on a regular basis.    Protecting Your Child from Violence and Conflict  Bullying, unfortunately, is often common in schools. Make sure your child knows they can talk to you whenever something bad is happening to them at school. Ask them periodically if they feel safe at school. To teach your child nonviolent ways to resolve conflict, be sure that you are being a good role model with your own behaviors when frustrated. When they are dealing with frustrating interactions, talk 
Admission procedure in process; patient alert and oriented. Denies shortness of breath, VSS and respirations easy. Patient c/o \"heavy\" left sided chest pain rated 8/10. Nitro paste in place, morphine given at [facility]. PO Percocet administered by this RN. Patient NSR on Telemetry with HR 63. Will continue to monitor.  Electronically signed by Barb Turk RN on 5/11/2020 at 3:26 PM
D/c order noted; patent notified. Patient saying she does not want to wait for a walker to arrive and wants to go home today. Her grandson will pick her up.  This nurse spoke with Ean Olvera re: she will attempt discussing a walker delivery tomorrow with Yulia Stewart from [employer] signed by Alisa Win RN on 5/12/2020 at 5:40 PM
Patient appears to be in good spirits. States \"feeling better. \" Denies chest pain at this time. Verbal and written discharge instructions were given to the patient, patient verbalizes understanding of discharge instructions including medications orders for home and possible side effects associated with them. Patient instructed and verbalizes understanding to call the doctor listed if they should have any complications or worsening symptoms. Verbalizes understanding about follow-up appointments. D/C IV patient tolerated well, no signs of bleeding. Patient is being discharged home with all belongings.  Electronically signed by Kosta Arizmendi RN on 5/12/2020 at 6:41 PM
about how the other person's point of view to help them develop empathy. Teach them that when they are angry they should practice deep breathing, walk away from the situation, or count to 10 before speaking or reacting in any way. Your child needs to know that outbursts and fighting are not effective ways of solving problems; those behaviors are only going to cause more problems for them in the future. StopBullying.gov and the American Academy of Pediatrics healthychildren.org website (search for “bullying”) have information that can help you deal with bullying in your child’s life.     [place] and Self-Esteem  Peers will become increasingly important to your young adolescent. Peer influence may lead your child to challenge family rules, reject certain family activities and act more independently. Some of your rules will change as your child grows older, but clear communication about rules and expectations remains very important. As your child spends more time with friends, your time together is very important to help your child feel secure, build their self-esteem, and help them become more independent. Build your child's self-esteem by telling them you are proud of them, point out their strengths, listen respectfully to them and give them lots of hugs. They should have age-appropriate chores and responsibilities with consequences (which you need to be consistent in enforcing!) if they are not completed. Non-school activities (sports, music, clubs, community activities) help them build new skills and self esteem. Keeping family ties strong even in light of increasing peer influence is critical because close family ties are known to be protective against risk-taking behaviors in adolescence. Make time every day to simply talk with their child without any phones, television, or other electronic media distracting you. Get to know your children's friends and make sure they always have adult supervision. Studies 
assist prn and home OT. Prognosis: Good  Decision Making: Medium Complexity  History: see above  Exam: decreased ADL status, endurance, balance/fxl mobility, sensation, strength R UE  Assistance / Modification: anticipate overall CGA/min A for ADLs  OT Education: OT Role;Plan of Care;Transfer Training;ADL Adaptive Strategies; Equipment  Barriers to Learning: none  REQUIRES OT FOLLOW UP: Yes  Activity Tolerance  Activity Tolerance: Patient Tolerated treatment well  Safety Devices  Safety Devices in place: Yes  Type of devices: Call light within reach; Left in chair;Chair alarm in place;Nurse notified;Gait belt(YOLETTE Fleming notified)           Patient Diagnosis(es): The primary encounter diagnosis was Acute chest pain. Diagnoses of History of coronary artery disease and History of coronary angioplasty with insertion of stent were also pertinent to this visit. has a past medical history of Acid reflux, Anemia, Anxiety, Arthritis, Asthma, Atrial fibrillation (HCC), Blood transfusion reaction, CAD (coronary artery disease), Cerebral artery occlusion with cerebral infarction ([address] CHF (congestive heart failure) ([address] Chronic kidney disease, COPD (chronic obstructive pulmonary disease) ([address] Depression, DM2 (diabetes mellitus, type 2) ([address] Dysarthria, Fibromyalgia, Headache(784.0), Hemisensory loss, Hyperlipidemia, Hypertension, IBS (irritable bowel syndrome), Inferior vena cava occlusion ([address] Irritable bowel syndrome, Keratitis, MI, old, Neuropathy, Superior vena cava obstruction, and Temporal arteritis ([address] has a past surgical history that includes knee surgery; Tunneled venous port placement; Coronary angioplasty with stent; Cholecystectomy; ablation of dysrhythmic focus; Cataract removal (Bilateral); joint replacement (Right); Hysterectomy; Artery Biopsy (Right, 04/23/2014);  Coronary angioplasty with stent; Knee arthroscopy (Right); vascular surgery; and Percutaneous Transluminal Coronary Angio
have shown that the more unsupervised time children have, the more likely they are to use drugs. It is important to talk with your child when they have peers making poor decisions. They should know that good friends never ask friends to do things that could be dangerous or get them into trouble. Your child should know that it is always okay for them to call you and ask to come home if they are not comfortable with something that is happening at another child's home.    Protecting Against Tobacco and Substance Use  If there is a smoker in your home, exposure to secondhand smoke (even from e-cigarettes) at home or in a car remains a health risk to your child at this age (at any age!). And as your child is getting older, there is the additional risk of them experimenting with cigarettes, as well as alcohol or any other drugs which might be kept in the home. It is important to frequently tell your child that any substance use is unhealthy and dangerous and that you do not want them to smoke, drink, use drugs or hang out with any friends who are doing those things. Talk to us if you need resources to assist with quitting smoking yourself.    Personal Safety and the Internet  For optimal safety, children this age should still be supervised on the internet. As they get older, they may be permitted to do some limited browsing without your supervision, but you should always be able to access what they are searching. You also need to set a limit on how much time they can spend on electronic media each day. This is also an important time to talk about how information in the media is often unrealistic regarding body image (unrealistic body images are often portrayed), risky behaviors (sending the message that  \"everyone's doing it\"), and violence (often portrayed as without any subsequent suffering or consequences). Tell your child that nothing is truly \"private\" on the internet; they should never text, post, or take pictures 
(aide does laundry, cleaning, cooking, groceries; gets MOW)  Ambulation Assistance: Independent(uses cane)  Transfer Assistance: Independent(sleeps in regular bed)  Active : No  Patient's  Info: margo takes for appointments or medical transport  Additional Comments: the pt reports she \"thinks\" she had a fall in last 3 months  Cognition   Cognition  Overall Cognitive Status: WFL    Objective  AROM RLE (degrees)  RLE AROM: WFL  AROM LLE (degrees)  LLE AROM : WFL  Strength RLE  Comment: at least 4-/5 thru-out but appeared to have poor effort during MMT  Strength LLE  Strength LLE: WFL     Sensation  Overall Sensation Status: Impaired  Additional Comments: reports she has neuropathy in fingers and toes  Bed mobility  Supine to Sit: Stand by assistance(HOB elevated)  Sit to Supine: Unable to assess(the pt up to the chair at end of session)  Transfers  Sit to Stand: Stand by assistance;Contact guard assistance(initially CGA then progressing to SBA)  Stand to sit: Stand by assistance  Ambulation  Ambulation?: Yes  More Ambulation?: Yes  Ambulation 1  Surface: level tile  Device: Single point cane  Quality of Gait: unsteady and reaching for objects with her R hand to steady self; multiple small LOB requiring assist to recover  Distance: 35 feet   Ambulation 2  Surface - 2: level tile  Device 2: Rolling Walker  Assistance 2: Contact guard assistance;Stand by assistance  Quality of Gait 2: steadier than with the cane, no LOB, able to manage the walker in the bathroom and on turns; initially CGA and then progressing to SBA  Distance: 35 feet x 1, 25 feet x 1  Comments: the pt reports she lost her walker when she moved and only has the cane now  Stairs/Curb  Stairs?: No     Balance  Posture: Good  Sitting - Static: Good  Sitting - Dynamic: Good  Standing - Static: Good  Standing - Dynamic: Fair;-  Comments: steadier with the walker; she is a high fall risk when using the cane; SBA static stance at the sink
of anything that they would not want to be public knowledge.     Never giving out personal information (full name, address, phone number) is important to internet safety as well as a child's overall personal safety. Remind your child that it is never okay for an older child or adult to ask them to keep a secret. Likewise, remind them that no one should ever talk to them or touch them in a way that makes them uncomfortable.    Puberty  Many children in this age range will start experiencing sexual development. Encourage them to ask questions and answer them in a way that they can understand. If you need guidance talking to your children about sex, check out the American Academy of Pediatrics healthychildren.org website (search “talking about sex”). Talk about the body changes that will start happening and the importance of good hygiene. Girls should know that they will have a small amount of vaginal discharge which is normal for a year or so before they start their periods. Boys should know that nocturnal emissions (\"wet dreams \") are normal. Reassure your child if their peers are developing and they are not; there is a wide range of \"normal\" for starting puberty. Changes may begin for some children at age 8 or 9 years old but it may be another few years before other children start developing.     Dental Health   Your child should be brushing at least twice daily for 2 minutes at a time with toothpaste; they should also be flossing daily and seeing a dentist regularly. Let our office know if you use water from a private well; testing for fluoride content is recommended to determine if fluoride supplements are needed. Tasty things like sweet drinks [juice, soda, sweet tea, Eric Aid®, etc.], candy, other sweets, and sticky/chewy foods cause tooth decay and should be occasional treats only, not a regular part of a child’s diet (and they should be followed by tooth brushing!). If your child is active in sports, they 
should wear a mouth guard.    Nutrition, Exercise and Screen Time  Children can become overweight or obese at any age. Healthy eating choices and regular physical activity are the best ways to keep a child at a healthy weight; dieting and supplements can be harmful at this age. Do your best to keep healthy choices in the home and encourage your child to make healthy eating decisions. Keep things like fresh fruits and vegetables, string cheese, whole grain crackers, yogurt, nuts, and hard-boiled eggs around for snacks. High calorie, high fat and sugary foods should be an occasional treat only. Your child should always have something for breakfast; it is a fact that children who eat breakfast perform better in school. Talk to your children about recognizing when they are full and the importance of avoiding overeating on a regular basis. As often as possible, mealtimes should be a family affair. Keep the TV off, keep phones away from the table, and encourage conversation between everyone in the family.    Good bone health starts at a young age. Your child needs 3 to 4 servings of a good source of calcium daily (low-fat or skim milk, yogurt, cheese, calcium-fortified orange juice or other foods). Vitamin D is needed along with calcium to optimize bone health; 600 IU daily is recommended. Most people cannot meet their vitamin D needs with their usual diet, so a multivitamin with iron supplement is a good way to get it. (A pure vitamin D supplement with 400 or 600 IU is also okay.)    The American Academy of Pediatrics recommends no more than 8 ounces of 100% fruit juice daily. Sports drinks may be appropriate after an hour or so of really vigorous exercise, but as a routine drink, they have lots of sugar and more calories than your child needs. Your child should drink plenty of water with routine activities. The high caffeine levels in energy drinks are potentially dangerous; children should not be allowed to drink 
these.    At least 60 minutes of physical activity every day is recommended for all children. Sports teams can be a great choice, but not all children want to play team sports. Options include dancing, swimming, gymnastics, riding a bike, skipping rope, and just plain old running around with other kids. Find activities that will get the whole family moving. When your child is active, make sure they are using any recommended safety equipment (helmets, safety goggles, mouth guards). Avoiding too much sitting (which is usually due to screen time!) is just as important as being active. Check out the American Academy of Pediatrics healthychildren.org website (search “media use”) for recommendations on setting reasonable rules for media (computers, phones, pads, TV) use in your home. Also, keeping phones and electronics out of the bedroom at night is a great step to making sure your child gets a healthy, restful sleep of night.    Car Safety  Children should ride in a booster seat until they are over 80 pounds and 4 feet 9 inches tall (which is how big they need to be before they can be safely secured by a seat belt). High-backed booster seats should be used if there are low seat backs or no head rests in your car; backless boosters can be used if your car has high seat backs and head rests. They are safest in the back seat until they are 13 years old.    Water Safety  Your child should be taught how to swim if they have not already had lessons. However, even if they are a good swimmer, they should never swim on their own without adult supervision. They should be taught not to dive into water until an adult has checked to make sure the water is deep enough to be safe for diving. Your child should always wear a [employer] approved lifejacket when on a boat or any watercraft. You should make sure that swimming pools that your child has access to (in your yard, apartment complex or neighborhood) are fenced with a locked, 
self-closing, self-latching gate.    Your child should wear sunscreen with an SPF of 15 or greater whenever they are outside. It should be reapplied every two hours and after being in the water. Encourage your child to wear other sun protective gear (hat, sunglasses, sun protection clothing) and avoid time in the sun between 11 and 3 PM when possible.    Guns and Firearms  If you keep a firearm in your home, be sure to store it unloaded and locked up with ammunition locked separately. Find out if there are firearms in any of the homes your child might visit and make sure those are safely stored before allowing your child to visit. Your child should know that if they ever see a gun, they should not touch it, they should leave the area, and they should tell an adult.    MEDICATION FOR FEVER OR PAIN:   Acetaminophen liquid (e.g., Tylenol or Tempra) may be given every four hours as needed for pain or fever.  Acetaminophen liquid is less concentrated than the infant dropper bottle type. Be sure to check which product [employer] you are using.      CHILDREN’S Tylenol/Acetaminophen  (160 MG/5 mL)    Child’s Weight:  Dose:  36 - 47 pounds:    240 mg (7.5 mL (1 1/2 Teaspoons))  48 - 59 pounds:    320 mg (10.0 mL (2 Teaspoons))  60 - 71 pounds:    400 mg (12.5 mL (2 1/2 Teaspoons))  72 - 95 pounds:    480 mg (15.0 mL (3 Teaspoons))    CHILDREN’S Tylenol/Acetaminophen MELTAWAYS ( 80 MG tablets)    Child’s Weight:  Dose:  36 - 47 pounds:    240 mg (3 meltaway tablets)  48 - 59 pounds:    320 mg (4 meltaway tablets)  60 - 71 pounds:    400 mg (5 meltaway tablets)  72 - 95 pounds:    480 mg (6 meltaway tablets)    Vu () Tylenol/Acetaminophen MELTAWAYS (160 MG tablets)    Child’s Weight:  Dose:  36 - 47 pounds:    240 mg (1 1/2 meltaway tablets)  48 - 59 pounds:    320 mg (2 meltaway tablets)  60 - 71 pounds:    400 mg (2 1/2 meltaway tablets)  72 - 95 pounds:    480 mg (3 meltaway tablets)    CHILDREN'S Ibuprofen (e.g., 
Advil or Motrin) may be given every six hours as needed for pain or fever.    Child’s Weight:  Dose:  48 - 59 pounds:    200 mg (2 Teaspoons of liquid)  60 - 71 pounds:    250 mg (2 1/2Teaspoons of liquid)  72 - 95 pounds:    300 mg (3 Teaspoons of liquid)    NEXT VISIT:  IN 1 Year      Thank you for entrusting your care to [facility].    Also, check out “Children’s Health” on the [facility] Blog for updates on timely topics regarding children’s health!

## 2020-09-23 ENCOUNTER — TELEPHONE (OUTPATIENT)
Dept: PRIMARY CARE CLINIC | Age: 65
End: 2020-09-23

## 2020-09-23 RX ORDER — ONDANSETRON 4 MG/1
4 TABLET, FILM COATED ORAL 3 TIMES DAILY PRN
Qty: 30 TABLET | Refills: 0 | Status: SHIPPED | OUTPATIENT
Start: 2020-09-23 | End: 2021-04-30

## 2020-09-23 NOTE — TELEPHONE ENCOUNTER
Trinh (physical therapist)   Update:   Calling with an update on condition:   Pt reports that she had a fall yesterday in her home. She told the therapist that she became dizzy yesterday and fell.   Phone: [phone]

## 2020-09-23 NOTE — TELEPHONE ENCOUNTER
----- Message from Bryant Thompson sent at 9/23/2020 10:59 AM EDT -----  Subject: Message to Provider    QUESTIONS  Information for Provider? Patient has been having nausea started over the   weekend no pain. She said the doc usually calls in medication for nausea. Also her one touch machine (for diabetes) has stopped working - Last time   she used it was yesterday morning.   ---------------------------------------------------------------------------  --------------  Jeovanny WHYTE  What is the best way for the office to contact you? OK to leave message on   voicemail  Preferred Call Back Phone Number? [phone]  ---------------------------------------------------------------------------  --------------  SCRIPT ANSWERS  Relationship to Patient?  Self

## 2020-09-25 RX ORDER — CALCIUM CITRATE/VITAMIN D3 200MG-6.25
1 TABLET ORAL 2 TIMES DAILY
Qty: 100 EACH | Refills: 5 | Status: ON HOLD | OUTPATIENT
Start: 2020-09-25 | End: 2022-01-10

## 2020-09-30 ENCOUNTER — NURSE TRIAGE (OUTPATIENT)
Dept: OTHER | Facility: CLINIC | Age: 65
End: 2020-09-30

## 2020-09-30 NOTE — TELEPHONE ENCOUNTER
pt states had dizzy spell and fell in to door last week having right rib pain, no bruising pt is on blood thinner  Reason for Disposition   Patient wants to be seen    Answer Assessment - Initial Assessment Questions  1. MECHANISM: \"How did the injury happen? \"       Tyree Lopez   2. ONSET: \"When did the injury happen? \" (Minutes or hours ago)      Last tuesday  3. LOCATION: \"What part of the abdomen is injured? \"      Right rib area  4. APPEARANCE of INJURY: \"What does the injury look like? \"      *No Answer*  5. PAIN: \"Is there any pain? \" If so, ask: \"How bad is the pain? \"  (e.g., Scale 1-10; or mild, moderate, severe)   - MILD - doesn't interfere with normal activities    - MODERATE - interferes with normal activities or awakens from sleep    - SEVERE - patient doesn't want to move (R/O peritonitis, internal bleeding)       6-8  6. SIZE: For cuts, bruises, or swelling, ask: \"How large is it? \" (e.g., inches or centimeters)      *No Answer*  7. TETANUS: For any breaks in the skin, ask: \"When was the last tetanus booster? \"      *No Answer*  8. OTHER SYMPTOMS: \"Do you have any other symptoms? \"      *No Answer*  9. PREGNANCY: \"Is there any chance you are pregnant? \" \"When was your last menstrual period? \"      *No Answer*    Protocols used: ABDOMINAL INJURY-ADULT-[place]

## 2020-10-01 ENCOUNTER — OFFICE VISIT (OUTPATIENT)
Dept: PRIMARY CARE CLINIC | Age: 65
End: 2020-10-01
Payer: COMMERCIAL

## 2020-10-01 VITALS
HEIGHT: 60 IN | WEIGHT: 129 LBS | SYSTOLIC BLOOD PRESSURE: 160 MMHG | HEART RATE: 72 BPM | BODY MASS INDEX: 25.32 KG/M2 | DIASTOLIC BLOOD PRESSURE: 70 MMHG | TEMPERATURE: 96.4 F

## 2020-10-01 LAB — HBA1C MFR BLD: 8.9 %

## 2020-10-01 PROCEDURE — 83036 HEMOGLOBIN GLYCOSYLATED A1C: CPT | Performed by: INTERNAL MEDICINE

## 2020-10-01 PROCEDURE — G8484 FLU IMMUNIZE NO ADMIN: HCPCS | Performed by: INTERNAL MEDICINE

## 2020-10-01 PROCEDURE — 90694 VACC AIIV4 NO PRSRV 0.5ML IM: CPT | Performed by: INTERNAL MEDICINE

## 2020-10-01 PROCEDURE — 2022F DILAT RTA XM EVC RTNOPTHY: CPT | Performed by: INTERNAL MEDICINE

## 2020-10-01 PROCEDURE — G8419 CALC BMI OUT NRM PARAM NOF/U: HCPCS | Performed by: INTERNAL MEDICINE

## 2020-10-01 PROCEDURE — 3046F HEMOGLOBIN A1C LEVEL >9.0%: CPT | Performed by: INTERNAL MEDICINE

## 2020-10-01 PROCEDURE — 1036F TOBACCO NON-USER: CPT | Performed by: INTERNAL MEDICINE

## 2020-10-01 PROCEDURE — G8427 DOCREV CUR MEDS BY ELIG CLIN: HCPCS | Performed by: INTERNAL MEDICINE

## 2020-10-01 PROCEDURE — G0008 ADMIN INFLUENZA VIRUS VAC: HCPCS | Performed by: INTERNAL MEDICINE

## 2020-10-01 PROCEDURE — 3017F COLORECTAL CA SCREEN DOC REV: CPT | Performed by: INTERNAL MEDICINE

## 2020-10-01 PROCEDURE — 99214 OFFICE O/P EST MOD 30 MIN: CPT | Performed by: INTERNAL MEDICINE

## 2020-10-01 RX ORDER — HYDRALAZINE HYDROCHLORIDE 50 MG/1
50 TABLET, FILM COATED ORAL 2 TIMES DAILY
Qty: 60 TABLET | Refills: 5 | Status: ON HOLD | OUTPATIENT
Start: 2020-10-01 | End: 2021-04-03 | Stop reason: HOSPADM

## 2020-10-01 RX ORDER — LIDOCAINE 50 MG/G
OINTMENT TOPICAL
Qty: 5 G | Refills: 3 | Status: SHIPPED | OUTPATIENT
Start: 2020-10-01 | End: 2021-04-06

## 2020-10-01 RX ORDER — ATORVASTATIN CALCIUM 80 MG/1
80 TABLET, FILM COATED ORAL NIGHTLY
Qty: 90 TABLET | Refills: 5 | Status: SHIPPED | OUTPATIENT
Start: 2020-10-01 | End: 2021-11-02

## 2020-10-01 RX ORDER — AMLODIPINE BESYLATE 5 MG/1
5 TABLET ORAL DAILY
Qty: 90 TABLET | Refills: 5 | Status: SHIPPED | OUTPATIENT
Start: 2020-10-01 | End: 2020-10-01 | Stop reason: SDUPTHER

## 2020-10-01 RX ORDER — TORSEMIDE 10 MG/1
10 TABLET ORAL DAILY
Qty: 90 TABLET | Refills: 5 | Status: SHIPPED | OUTPATIENT
Start: 2020-10-01 | End: 2020-12-22

## 2020-10-01 RX ORDER — INSULIN ASPART 100 [IU]/ML
5-12 INJECTION, SOLUTION INTRAVENOUS; SUBCUTANEOUS
Qty: 7 PEN | Refills: 5 | Status: ON HOLD | OUTPATIENT
Start: 2020-10-01 | End: 2020-12-23 | Stop reason: HOSPADM

## 2020-10-01 RX ORDER — METOPROLOL SUCCINATE 50 MG/1
25 TABLET, EXTENDED RELEASE ORAL 2 TIMES DAILY WITH MEALS
Qty: 30 TABLET | Refills: 5 | Status: SHIPPED | OUTPATIENT
Start: 2020-10-01 | End: 2021-02-05

## 2020-10-01 RX ORDER — INSULIN GLARGINE 100 [IU]/ML
40 INJECTION, SOLUTION SUBCUTANEOUS NIGHTLY
Qty: 15 ML | Refills: 5 | Status: SHIPPED | OUTPATIENT
Start: 2020-10-01 | End: 2021-03-01

## 2020-10-01 RX ORDER — AMLODIPINE BESYLATE 5 MG/1
5 TABLET ORAL 2 TIMES DAILY
Qty: 180 TABLET | Refills: 5 | Status: ON HOLD | OUTPATIENT
Start: 2020-10-01 | End: 2022-01-10 | Stop reason: ALTCHOICE

## 2020-10-01 RX ORDER — ISOSORBIDE MONONITRATE 30 MG/1
30 TABLET, EXTENDED RELEASE ORAL DAILY
Qty: 90 TABLET | Refills: 5 | Status: SHIPPED | OUTPATIENT
Start: 2020-10-01 | End: 2021-01-28 | Stop reason: ALTCHOICE

## 2020-10-01 ASSESSMENT — ENCOUNTER SYMPTOMS
NAUSEA: 0
VOMITING: 0
BACK PAIN: 1
BLOOD IN STOOL: 0
ABDOMINAL DISTENTION: 0
CONSTIPATION: 0
CHEST TIGHTNESS: 0

## 2020-10-01 NOTE — PROGRESS NOTES
for follow-up blood testing. She also complained of chest pain during her stay and has had noted CAD on prior catheterization in December. C/O  Cardiology  Dr. Leann Ding. Troponin was negative. She will follow-up with cardiology as an outpatient. Fall   Associated symptoms include headaches. Pertinent negatives include no fever, nausea or vomiting. Dizziness   Associated symptoms include arthralgias, headaches, myalgias and weakness. Pertinent negatives include no fatigue, fever, nausea or vomiting. Abdominal Pain   Associated symptoms include arthralgias, headaches and myalgias. Pertinent negatives include no constipation, dysuria, fever, frequency, nausea or vomiting. Review of Systems   Constitutional: Negative for activity change, appetite change, fatigue, fever and unexpected weight change. Flu vac 9/19     tdap 10/16      pneumovac 10/13     Shingrex / Script  8/18      HENT:        No teeth     No dentures    Eyes: Positive for visual disturbance. Last Eye Ex 3/20   Respiratory: Negative for chest tightness. Shortness of breath: baseline. Not smoking > 1 yr       Was Smoking  off and on . Seen  Pulmonary 2/19 for Ch Cough / SOB   Denies Etoh . No rec drugs    Cardiovascular: Negative. Known CAD with Stents . Last Cardiac exam  8 /20  with [email or web address] . S/P Angiogram . stble      Gastrointestinal: Negative for abdominal distention, blood in stool, constipation, nausea and vomiting. Upper / lower endopscopy 4/19       No Fh of ca colon . Genitourinary: Negative for dysuria, frequency, menstrual problem, urgency and vaginal discharge. Hysterectomy   Mammogram 1/16    Musculoskeletal: Positive for arthralgias, back pain and myalgias. Pain Disorder c/o Pain Management    Neurological: Positive for dizziness, weakness, light-headedness (off and on ) and headaches.               Hematological:           Did see Hematology for anemia .   Bone Marrow exam Nl    Psychiatric/Behavioral: Negative for behavioral problems and sleep disturbance. The patient is not nervous/anxious. Objective:   Physical Exam  Vitals signs reviewed. Constitutional:       General: She is not in acute distress. Appearance: She is not ill-appearing. Eyes:      Extraocular Movements: Extraocular movements intact. Neck:      Musculoskeletal: Neck supple. Thyroid: No thyromegaly. Cardiovascular:      Rate and Rhythm: Regular rhythm. Heart sounds: Normal heart sounds. Pulmonary:      Breath sounds: No wheezing or rhonchi. Chest:      Chest wall: Tenderness present. Abdominal:      General: There is no distension. Palpations: Abdomen is soft. There is no mass. Tenderness: There is no abdominal tenderness. There is no guarding or rebound. Musculoskeletal: Normal range of motion. General: No tenderness. Comments:      Skin:     General: Skin is warm and dry. Neurological:      Mental Status: She is oriented to person, place, and time. Gait: Gait abnormal.   Psychiatric:         Attention and Perception: Attention normal.         Behavior: Behavior normal.         Assessment:        Maren was seen today for fall, dizziness and abdominal pain. Diagnoses and all orders for this visit:    Fall, initial encounter  Gait problems . Use walker . Ordered Home PT/OT   -     XR CHEST (2 VW); Future  -     lidocaine (XYLOCAINE) 5 % ointment; Apply topically as needed. Uncontrolled type 2 diabetes mellitus with complication, with long-term current use of insulin ([facility])increasing Basaglar to 40 U   -     POCT glycosylated hemoglobin (Hb A1C)  -     insulin glargine (BASAGLAR KWIKPEN) 100 UNIT/ML injection pen; Inject 40 Units into the skin nightly  -     insulin aspart (NOVOLOG FLEXPEN) 100 UNIT/ML injection pen;  Inject 5-12 Units into the skin 3 times daily (before meals) Sugar < 150   Zero Unit  151-200   5 U   201- 250 tablet; Take 1 tablet by mouth 2 times daily    Coronary artery disease involving native coronary artery of native heart without angina pectoris  -    Plavix 75 mg / d    metoprolol succinate (TOPROL XL) 50 MG extended release tablet; Take 0.5 tablets by mouth daily  -     RANEXA 1000 MG extended release tablet; Take 1 tablet by mouth 2 times daily  -     atorvastatin (LIPITOR) 80 MG tablet; Take 1 tablet by mouth daily  -     aspirin (ASPIRIN LOW DOSE) 81 MG EC tablet; Take 1 tablet by mouth daily  -     isosorbide mononitrate (IMDUR) 30 MG extended release tablet; Take 1 tablet by mouth daily  -     nitroGLYCERIN (NITROSTAT) 0.4 MG SL tablet; Place 1 tablet under the tongue every 5 minutes as needed for Chest pain up to max of 3 total doses. If no relief after 1 dose, call 911. Diabetic gastroparesis (HCC)  Control sugars .        Other hyperlipidemia  80 mg Lipitor             Anxiety  and Depression     cymbalta per Pain Dr   Plan:              Tish Lay MD

## 2020-10-14 ENCOUNTER — VIRTUAL VISIT (OUTPATIENT)
Dept: PAIN MANAGEMENT | Age: 65
End: 2020-10-14
Payer: COMMERCIAL

## 2020-10-14 PROCEDURE — 2022F DILAT RTA XM EVC RTNOPTHY: CPT | Performed by: INTERNAL MEDICINE

## 2020-10-14 PROCEDURE — 3052F HG A1C>EQUAL 8.0%<EQUAL 9.0%: CPT | Performed by: INTERNAL MEDICINE

## 2020-10-14 PROCEDURE — 3017F COLORECTAL CA SCREEN DOC REV: CPT | Performed by: INTERNAL MEDICINE

## 2020-10-14 PROCEDURE — G8427 DOCREV CUR MEDS BY ELIG CLIN: HCPCS | Performed by: INTERNAL MEDICINE

## 2020-10-14 PROCEDURE — 99213 OFFICE O/P EST LOW 20 MIN: CPT | Performed by: INTERNAL MEDICINE

## 2020-10-14 RX ORDER — DIVALPROEX SODIUM 250 MG/1
TABLET, EXTENDED RELEASE ORAL
Qty: 90 TABLET | Refills: 1 | Status: SHIPPED | OUTPATIENT
Start: 2020-10-14 | End: 2020-11-11 | Stop reason: SDUPTHER

## 2020-10-14 RX ORDER — OXYCODONE AND ACETAMINOPHEN 7.5; 325 MG/1; MG/1
1 TABLET ORAL EVERY 6 HOURS PRN
Qty: 84 TABLET | Refills: 0 | Status: SHIPPED | OUTPATIENT
Start: 2020-10-14 | End: 2020-11-11 | Stop reason: SDUPTHER

## 2020-10-14 RX ORDER — TIZANIDINE 4 MG/1
TABLET ORAL
Qty: 60 TABLET | Refills: 1 | Status: SHIPPED | OUTPATIENT
Start: 2020-10-14 | End: 2020-11-11 | Stop reason: SDUPTHER

## 2020-10-14 RX ORDER — ERENUMAB-AOOE 70 MG/ML
140 INJECTION SUBCUTANEOUS
Qty: 1 PEN | Refills: 1 | Status: SHIPPED | OUTPATIENT
Start: 2020-10-14 | End: 2020-11-11 | Stop reason: SDUPTHER

## 2020-10-14 NOTE — PROGRESS NOTES
nothing. Current treatment regimen has helped relieve about 10% of the pain. She denies side effects from the current pain regimen. Patient reports that since the last follow up visit the physical functioning is unchanged, family/social relationships are unchanged, mood is unchanged and sleep patterns are unchanged, and that the overall functioning is unchanged. Patient denies neurological bowel or bladder. Patient denies misusing/abusing her narcotic pain medications or using any illegal drugs. There are No indicators for possible drug abuse, addiction or diversion problems. Patient states she has been doing fair, had went to the ER for a headache. She complains she has been having increase headaches. She mentions she has not had any change. She says she had a MRI/CT scan done and all was negative. She reports she is using Ubrelvy. I'm not sure if she is complaint with the medications. Patient states her sleep is fair. Has fairly normal sleep latency. Averages about 4-6 hours of sleep a night. Denies any signs of sleep apnea. Feels somewhat rested in the morning. Patient's  subjective report of her mood is fair. she describes occasional symptoms of depression, occasional  irritability and some mood swings. Describes her mood as being neutral and reports some pleasure in her daily activities. Reports  fair  appetite, energy and concentration. Able to function well in different aspects of her daily activities. Denies suicidal or homicidal ideation. Denies any complaints of increased tension, does   Worry sometimes and occasional  irritability  she denies any c/o increased anxiety, No c/o panic attacks or symptoms of PTSD. She feels the Neurontin makes her anxious. She reports she is managing her house chores and ADLS. She state she lives on her own and is managing light house chores and her breathing has been okay.      ALLERGIES/PAST MED/FAM/SOC HISTORY: Ms. Cody Mahoney allergies, past medical, family and social history were reviewed in the chart and also listed below. Social History     Socioeconomic History    Marital status:      Spouse name: Not on file    Number of children: 6    Years of education: Not on file    Highest education level: Not on file   Occupational History    Not on file   Social Needs    Financial resource strain: Not on file    Food insecurity     Worry: Not on file     Inability: Not on file    Transportation needs     Medical: Not on file     Non-medical: Not on file   Tobacco Use    Smoking status: Former Smoker     Packs/day: 0.50     Years: 35.00     Pack years: 17.50     Types: Cigarettes     Last attempt to quit: 2018     Years since quittin.2    Smokeless tobacco: Former User    Tobacco comment: 5/13/15 has not smoked since hospitalization -    Substance and Sexual Activity    Alcohol use: No     Alcohol/week: 0.0 standard drinks    Drug use: No    Sexual activity: Yes     Partners: Male   Lifestyle    Physical activity     Days per week: Not on file     Minutes per session: Not on file    Stress: Not on file   Relationships    Social connections     Talks on phone: Not on file     Gets together: Not on file     Attends Mandaeism service: Not on file     Active member of club or organization: Not on file     Attends meetings of clubs or organizations: Not on file     Relationship status: Not on file    Intimate partner violence     Fear of current or ex partner: Not on file     Emotionally abused: Not on file     Physically abused: Not on file     Forced sexual activity: Not on file   Other Topics Concern    Not on file   Social History Narrative    Not on file       Ms. Meza current medications are   Outpatient Medications Prior to Visit   Medication Sig Dispense Refill    insulin glargine (BASAGLAR KWIKPEN) 100 UNIT/ML injection pen Inject 40 Units into the skin nightly 15 mL 5    insulin aspart (NOVOLOG FLEXPEN) 100 UNIT/ML injection pen Inject 5-12 Units into the skin 3 times daily (before meals) Sugar < 150   Zero Unit  151-200   5 U   201- 250   8 U   251-300    12 U 7 pen 5    torsemide (DEMADEX) 10 MG tablet Take 1 tablet by mouth daily 90 tablet 5    hydrALAZINE (APRESOLINE) 50 MG tablet Take 1 tablet by mouth 2 times daily 60 tablet 5    metoprolol succinate (TOPROL XL) 50 MG extended release tablet Take 0.5 tablets by mouth 2 times daily (with meals) 30 tablet 5    isosorbide mononitrate (IMDUR) 30 MG extended release tablet Take 1 tablet by mouth daily 90 tablet 5    atorvastatin (LIPITOR) 80 MG tablet Take 1 tablet by mouth nightly 90 tablet 5    amLODIPine (NORVASC) 5 MG tablet Take 1 tablet by mouth 2 times daily 180 tablet 5    lidocaine (XYLOCAINE) 5 % ointment Apply topically as needed. 5 g 3    blood glucose test strips (TRUE METRIX BLOOD GLUCOSE TEST) strip 1 each by Does not apply route 2 times daily 100 each 5    Blood Glucose Monitoring Suppl (TRUE METRIX METER) w/Device KIT 1 kit by Does not apply route 2 times daily 1 kit 0    ondansetron (ZOFRAN) 4 MG tablet Take 1 tablet by mouth 3 times daily as needed for Nausea or Vomiting 30 tablet 0    ASPIRIN LOW DOSE 81 MG EC tablet TAKE 1 TABLET BY MOUTH DAILY 90 tablet 5    clopidogrel (PLAVIX) 75 MG tablet TAKE 1 TABLET BY MOUTH DA JULIEN 90 tablet 5    divalproex (DEPAKOTE ER) 250 MG extended release tablet Take 1 tablet by mouth 2 times daily 60 tablet 1    DULoxetine (CYMBALTA) 60 MG extended release capsule Take 1 capsule by mouth daily 30 capsule 1    gabapentin (NEURONTIN) 600 MG tablet Take 1 tablet by mouth nightly for 30 days. 90 tablet 1    oxyCODONE-acetaminophen (PERCOCET) 7.5-325 MG per tablet Take 1 tablet by mouth every 6 hours as needed for Pain (max 3-4 day) for up to 28 days.  84 tablet 0    tiZANidine (ZANAFLEX) 4 MG tablet Take 1/2-1 tablet po BID 60 tablet 0    QUEtiapine (SEROQUEL) 25 MG tablet Take 1-2 tablets by mouth nightly 60 tablet 1    Ubrogepant (UBRELVY) 50 MG TABS Talk 1 tablet as needed at start of headaches, can repeat in 24 hours 10 tablet 1    Erenumab-aooe (AIMOVIG, 140 MG DOSE,) 70 MG/ML SOAJ Inject 140 mLs into the skin every 30 days 1 pen 0    UltiCare Alcohol Swabs 70 % PADS USE TO TEST BLOOD GLUCOSE THREE TIMES DAILY 100 each 5    pantoprazole (PROTONIX) 40 MG tablet Take 1 tablet by mouth every morning (before breakfast) 30 tablet 3    ondansetron (ZOFRAN) 4 MG tablet Take 1 tablet by mouth 3 times daily as needed for Nausea or Vomiting 30 tablet 0    Nebulizers (COMPRESSOR/NEBULIZER) MISC 1 Device by Does not apply route 4 times daily as needed (SOB / Wheezing) 1 each 0    albuterol (PROVENTIL) (2.5 MG/3ML) 0.083% nebulizer solution Take 3 mLs by nebulization every 4 hours as needed for Wheezing 120 each 5    Lancet Devices (SIMPLE DIAGNOSTICS LANCING DEV) MISC USE WITH LANCETS TO TEST BLOOD GLUCOSE 1 each 11    Pharmacist Choice Lancets MISC USE TO TEST BLOOD GLUCOSE THREE TIMES DAILY 300 each 5    UNIFINE PENTIPS 31G X 8 MM MISC USE WITH insulin pens 100 each 5    budesonide-formoterol (SYMBICORT) 160-4.5 MCG/ACT AERO Inhale 2 puffs into the lungs daily 2 Inhaler 5    albuterol sulfate HFA (VENTOLIN HFA) 108 (90 Base) MCG/ACT inhaler Inhale 2 puffs into the lungs every 4 hours as needed for Wheezing or Shortness of Breath 3 Inhaler 5    ranolazine (RANEXA) 1000 MG extended release tablet Take 1 tablet by mouth 2 times daily 60 tablet 8    nitroGLYCERIN (NITROSTAT) 0.4 MG SL tablet Place 1 tablet under the tongue every 5 minutes as needed for Chest pain up to max of 3 total doses. If no relief after 1 dose, call 911. 25 tablet 3    SUMAtriptan (IMITREX) 50 MG tablet Take 1 tablet by mouth once as needed for Migraine 9 tablet 0     No facility-administered medications prior to visit. REVIEW OF SYSTEMS:   Constitutional: Negative for fatigue and unexpected weight change. Eyes: Negative for visual disturbance. Respiratory: Negative for shortness of breath. Cardiovascular: Negative for chest pain, palpitations  Gastrointestinal: Negative for blood in stool, abdominal distention, nausea, vomiting, abdominal pain, diarrhea,constipation. Skin: Negative for color change or any abnormal bruising. Neurological: Negative for speech difficulty, weakness and light-headedness, dizziness, tremors, sleepiness  Psychiatric/Behavioral: Negative for suicidal ideas, hallucinations, behavioral problems, self-injury, decreased concentration/cognition, agitation, confusion. PHYSICAL EXAM:   Nursing note and vitals per patient reviewed. There were no vitals taken for this visit. Constitutional: She appears well-developed and well-nourished. No acute distress. No respiratory distress. Skin: Skin appears to be warm and dry. No rashes or any other marks noted. She is not diaphoretic. Pulmonary/Respiratory: NO conversational dyspnea, no accessory muscle use, no coughing during exam. @ does not show labored breathing. Neurological/Psychiatric:She is alert and oriented to person, place, and time. Coordination is  normal.  Her mood isAppropriate and affect is Flat/blunted and Anxious. Her behavior is normal.   thought content normal.   Musculoskeletal / Extremities: Range of motion is normal. Gait is normal, assistive devices use: none. IMPRESSION:   1. Intractable migraine with aura without status migrainosus - INADEQUATELY CONTROLLED: treatment plan adjusted as below     2. Chronic pain syndrome - INADEQUATELY CONTROLLED: treatment plan adjusted as below    3. DDD (degenerative disc disease), lumbar - STABLE: Continue current treatment plan    4. Fibromyalgia - STABLE: Continue current treatment plan    5. Chronic painful diabetic neuropathy (HCC) - STABLE: Continue current treatment plan    6. DDD (degenerative disc disease), cervical - STABLE: Continue current treatment plan    7.  Primary insomnia - STABLE: Continue current treatment plan    8. Moderate episode of recurrent major depressive disorder (HCC) - STABLE: Continue current treatment plan    9. Rotator cuff tendonitis, right - STABLE: Continue current treatment plan        PLAN:  Informed verbal consent regarding treatment was obtained  - she was advised  to avoid using too many OTC analgesics to control the headaches, avoid chocolates, increased caffeine, cheeses and MSG nitrite containing foods, cigarette smoking. To avoid bright lights, strong smells and skipping meals.   -Interim history reviewed   -Labs/imaging studies reviewed   -Start Aimovig 140 mg S/C monthly for headaches   -Increase Depakote ER to 750 mg daily   -Discontinue Neurontin   -Adv Biofeedback, relaxation and meditation techniques.  Referral to psychologist for CBT was also discussed with patient   -she was advised proper sleep hygiene-told to avoid:use of caffeine or other stimulants after noon, alcohol use near bedtime, long or frequent naps during the day, erratic sleep schedule, heavy meals near bedtime, vigorous exercise near bedtime and use of electronic devices near bedtime   -Continue with Seroquel   -She was advised to increase fluids ( 5-7  glasses of fluid daily), limit caffeine, avoid cheese products, increase dietary fiber, increase activity and exercise as tolerated and relax regularly and enjoy meals   -Continue with Percocet 3 per day   Current Outpatient Medications   Medication Sig Dispense Refill    insulin glargine (BASAGLAR KWIKPEN) 100 UNIT/ML injection pen Inject 40 Units into the skin nightly 15 mL 5    insulin aspart (NOVOLOG FLEXPEN) 100 UNIT/ML injection pen Inject 5-12 Units into the skin 3 times daily (before meals) Sugar < 150   Zero Unit  151-200   5 U   201- 250   8 U   251-300    12 U 7 pen 5    torsemide (DEMADEX) 10 MG tablet Take 1 tablet by mouth daily 90 tablet 5    hydrALAZINE (APRESOLINE) 50 MG tablet Take 1 tablet by mouth 2 times daily 60 tablet 5    metoprolol succinate (TOPROL XL) 50 MG extended release tablet Take 0.5 tablets by mouth 2 times daily (with meals) 30 tablet 5    isosorbide mononitrate (IMDUR) 30 MG extended release tablet Take 1 tablet by mouth daily 90 tablet 5    atorvastatin (LIPITOR) 80 MG tablet Take 1 tablet by mouth nightly 90 tablet 5    amLODIPine (NORVASC) 5 MG tablet Take 1 tablet by mouth 2 times daily 180 tablet 5    lidocaine (XYLOCAINE) 5 % ointment Apply topically as needed. 5 g 3    blood glucose test strips (TRUE METRIX BLOOD GLUCOSE TEST) strip 1 each by Does not apply route 2 times daily 100 each 5    Blood Glucose Monitoring Suppl (TRUE METRIX METER) w/Device KIT 1 kit by Does not apply route 2 times daily 1 kit 0    ondansetron (ZOFRAN) 4 MG tablet Take 1 tablet by mouth 3 times daily as needed for Nausea or Vomiting 30 tablet 0    ASPIRIN LOW DOSE 81 MG EC tablet TAKE 1 TABLET BY MOUTH DAILY 90 tablet 5    clopidogrel (PLAVIX) 75 MG tablet TAKE 1 TABLET BY MOUTH DA JULIEN 90 tablet 5    divalproex (DEPAKOTE ER) 250 MG extended release tablet Take 1 tablet by mouth 2 times daily 60 tablet 1    DULoxetine (CYMBALTA) 60 MG extended release capsule Take 1 capsule by mouth daily 30 capsule 1    gabapentin (NEURONTIN) 600 MG tablet Take 1 tablet by mouth nightly for 30 days. 90 tablet 1    oxyCODONE-acetaminophen (PERCOCET) 7.5-325 MG per tablet Take 1 tablet by mouth every 6 hours as needed for Pain (max 3-4 day) for up to 28 days.  84 tablet 0    tiZANidine (ZANAFLEX) 4 MG tablet Take 1/2-1 tablet po BID 60 tablet 0    QUEtiapine (SEROQUEL) 25 MG tablet Take 1-2 tablets by mouth nightly 60 tablet 1    Ubrogepant (UBRELVY) 50 MG TABS Talk 1 tablet as needed at start of headaches, can repeat in 24 hours 10 tablet 1    Erenumab-aooe (AIMOVIG, 140 MG DOSE,) 70 MG/ML SOAJ Inject 140 mLs into the skin every 30 days 1 pen 0    UltiCare Alcohol Swabs 70 % PADS USE TO TEST BLOOD GLUCOSE THREE TIMES DAILY 100 each 5    pantoprazole (PROTONIX) 40 MG tablet Take 1 tablet by mouth every morning (before breakfast) 30 tablet 3    ondansetron (ZOFRAN) 4 MG tablet Take 1 tablet by mouth 3 times daily as needed for Nausea or Vomiting 30 tablet 0    Nebulizers (COMPRESSOR/NEBULIZER) MISC 1 Device by Does not apply route 4 times daily as needed (SOB / Wheezing) 1 each 0    albuterol (PROVENTIL) (2.5 MG/3ML) 0.083% nebulizer solution Take 3 mLs by nebulization every 4 hours as needed for Wheezing 120 each 5    Lancet Devices (SIMPLE DIAGNOSTICS LANCING DEV) MISC USE WITH LANCETS TO TEST BLOOD GLUCOSE 1 each 11    Pharmacist Choice Lancets MISC USE TO TEST BLOOD GLUCOSE THREE TIMES DAILY 300 each 5    UNIFINE PENTIPS 31G X 8 MM MISC USE WITH insulin pens 100 each 5    budesonide-formoterol (SYMBICORT) 160-4.5 MCG/ACT AERO Inhale 2 puffs into the lungs daily 2 Inhaler 5    albuterol sulfate HFA (VENTOLIN HFA) 108 (90 Base) MCG/ACT inhaler Inhale 2 puffs into the lungs every 4 hours as needed for Wheezing or Shortness of Breath 3 Inhaler 5    ranolazine (RANEXA) 1000 MG extended release tablet Take 1 tablet by mouth 2 times daily 60 tablet 8    nitroGLYCERIN (NITROSTAT) 0.4 MG SL tablet Place 1 tablet under the tongue every 5 minutes as needed for Chest pain up to max of 3 total doses. If no relief after 1 dose, call 911. 25 tablet 3    SUMAtriptan (IMITREX) 50 MG tablet Take 1 tablet by mouth once as needed for Migraine 9 tablet 0     No current facility-administered medications for this visit. I will continue her current medication regimen  which is part of the above treatment schedule. It has been helping with Ms. Meza's chronic  medical problems which for this visit include:   Diagnoses of Chronic pain syndrome, DDD (degenerative disc disease), lumbar, Fibromyalgia, Chronic painful diabetic neuropathy ([facility] [address] Tendinitis of right rotator cuff, Rotator cuff tendonitis, right, Intractable migraine with aura without status migrainosus, and DDD (degenerative disc disease), cervical were pertinent to this visit. Risks and benefits of the medications and other alternative treatments  including no treatment were discussed with the patient. The common side effects of these medications were also explained to the patient. Informed verbal consent was obtained. Goals of current treatment regimen include improvement in pain, restoration of functioning- with focus on improvement in physical performance, general activity, work or disability,emotional distress, health care utilization and  decreased medication consumption. Will continue to monitor progress towards achieving/maintaining therapeutic goals with special emphasis on  1. Improvement in perceived interfernce  of pain with ADL's. Ability to do home exercises independently. Ability to do household chores indoor and/or outdoor work and social and leisure activities. Improve psychosocial and physical functioning. - she is not showing any significant progress/or showing regression  towards this goal and reassessment and adjustment of goals/treatment have been made. 2. Improving sleep to 6-7 hours a night. Improve mood/ anxiety and depression symptoms such as crying spells, low energy, problems with concentration, motivation. - she is showing progression towards this treatment goal with the current regimen. 3. Reduction of reliance on opioid analgesia/more appropriate opioid use. - she is showing progression towards this treatment goal with the current regimen. She was advised against drinking alcohol with the narcotic pain medicines, advised against driving or handling machinery while adjusting the dose of medicines or if having cognitive  issues related to the current medications. Risk of overdose and death, if medicines not taken as prescribed, were also discussed. If the patient develops new symptoms or if the symptoms worsen, the patient should call the office.     While

## 2020-10-27 ENCOUNTER — HOSPITAL ENCOUNTER (EMERGENCY)
Age: 65
Discharge: HOME | End: 2020-10-27
Attending: STUDENT IN AN ORGANIZED HEALTH CARE EDUCATION/TRAINING PROGRAM
Payer: COMMERCIAL

## 2020-10-27 ENCOUNTER — APPOINTMENT (OUTPATIENT)
Dept: CT IMAGING | Age: 65
End: 2020-10-27
Payer: COMMERCIAL

## 2020-10-27 VITALS
RESPIRATION RATE: 17 BRPM | DIASTOLIC BLOOD PRESSURE: 51 MMHG | HEART RATE: 89 BPM | BODY MASS INDEX: 25.41 KG/M2 | WEIGHT: 129.41 LBS | HEIGHT: 60 IN | TEMPERATURE: 98.8 F | OXYGEN SATURATION: 100 % | SYSTOLIC BLOOD PRESSURE: 144 MMHG

## 2020-10-27 LAB
A/G RATIO: 1.1 (ref 1.1–2.2)
ALBUMIN SERPL-MCNC: 3.9 G/DL (ref 3.4–5)
ALP BLD-CCNC: 106 U/L (ref 40–129)
ALT SERPL-CCNC: 13 U/L (ref 10–40)
ANION GAP SERPL CALCULATED.3IONS-SCNC: 12 MMOL/L (ref 3–16)
ANISOCYTOSIS: ABNORMAL
AST SERPL-CCNC: 25 U/L (ref 15–37)
BACTERIA WET PREP: NORMAL
BASOPHILS ABSOLUTE: 0 K/UL (ref 0–0.2)
BASOPHILS RELATIVE PERCENT: 0.7 %
BILIRUB SERPL-MCNC: <0.2 MG/DL (ref 0–1)
BILIRUBIN URINE: NEGATIVE
BLOOD, URINE: ABNORMAL
BUN BLDV-MCNC: 18 MG/DL (ref 7–20)
CALCIUM SERPL-MCNC: 9.3 MG/DL (ref 8.3–10.6)
CHLORIDE BLD-SCNC: 102 MMOL/L (ref 99–110)
CLARITY: ABNORMAL
CLUE CELLS: NORMAL
CO2: 25 MMOL/L (ref 21–32)
COLOR: YELLOW
CREAT SERPL-MCNC: 1.1 MG/DL (ref 0.6–1.2)
EOSINOPHILS ABSOLUTE: 0.1 K/UL (ref 0–0.6)
EOSINOPHILS RELATIVE PERCENT: 1.5 %
EPITHELIAL CELLS WET PREP: NORMAL
EPITHELIAL CELLS, UA: 7 /HPF (ref 0–5)
GFR AFRICAN AMERICAN: >60
GFR NON-AFRICAN AMERICAN: 50
GLOBULIN: 3.6 G/DL
GLUCOSE BLD-MCNC: 176 MG/DL (ref 70–99)
GLUCOSE URINE: 250 MG/DL
HCG QUALITATIVE: NEGATIVE
HCT VFR BLD CALC: 29.5 % (ref 36–48)
HEMOGLOBIN: 9.7 G/DL (ref 12–16)
HYALINE CASTS: 3 /LPF (ref 0–8)
HYPOCHROMIA: ABNORMAL
KETONES, URINE: NEGATIVE MG/DL
LEUKOCYTE ESTERASE, URINE: ABNORMAL
LYMPHOCYTES ABSOLUTE: 1.4 K/UL (ref 1–5.1)
LYMPHOCYTES RELATIVE PERCENT: 26.7 %
MCH RBC QN AUTO: 32.4 PG (ref 26–34)
MCHC RBC AUTO-ENTMCNC: 33 G/DL (ref 31–36)
MCV RBC AUTO: 98.1 FL (ref 80–100)
MICROSCOPIC EXAMINATION: YES
MONOCYTES ABSOLUTE: 0.3 K/UL (ref 0–1.3)
MONOCYTES RELATIVE PERCENT: 5.9 %
NEUTROPHILS ABSOLUTE: 3.4 K/UL (ref 1.7–7.7)
NEUTROPHILS RELATIVE PERCENT: 65.2 %
NITRITE, URINE: NEGATIVE
PDW BLD-RTO: 14.1 % (ref 12.4–15.4)
PH UA: 5.5 (ref 5–8)
PLATELET # BLD: 203 K/UL (ref 135–450)
PLATELET SLIDE REVIEW: ADEQUATE
PMV BLD AUTO: 8.5 FL (ref 5–10.5)
POTASSIUM REFLEX MAGNESIUM: 3.8 MMOL/L (ref 3.5–5.1)
PROTEIN UA: >=300 MG/DL
RBC # BLD: 3.01 M/UL (ref 4–5.2)
RBC UA: 229 /HPF (ref 0–4)
RBC WET PREP: NORMAL
SLIDE REVIEW: ABNORMAL
SODIUM BLD-SCNC: 139 MMOL/L (ref 136–145)
SOURCE WET PREP: NORMAL
SPECIFIC GRAVITY UA: 1.02 (ref 1–1.03)
TOTAL PROTEIN: 7.5 G/DL (ref 6.4–8.2)
TRICHOMONAS PREP: NORMAL
URINE REFLEX TO CULTURE: ABNORMAL
URINE TYPE: ABNORMAL
UROBILINOGEN, URINE: 1 E.U./DL
VACUOLATED NEUTROPHILS: PRESENT
WBC # BLD: 5.2 K/UL (ref 4–11)
WBC UA: 7 /HPF (ref 0–5)
WBC WET PREP: NORMAL
YEAST WET PREP: NORMAL

## 2020-10-27 PROCEDURE — 87210 SMEAR WET MOUNT SALINE/INK: CPT

## 2020-10-27 PROCEDURE — 87491 CHLMYD TRACH DNA AMP PROBE: CPT

## 2020-10-27 PROCEDURE — 84703 CHORIONIC GONADOTROPIN ASSAY: CPT

## 2020-10-27 PROCEDURE — 80053 COMPREHEN METABOLIC PANEL: CPT

## 2020-10-27 PROCEDURE — 74176 CT ABD & PELVIS W/O CONTRAST: CPT

## 2020-10-27 PROCEDURE — 87591 N.GONORRHOEAE DNA AMP PROB: CPT

## 2020-10-27 PROCEDURE — 99284 EMERGENCY DEPT VISIT MOD MDM: CPT

## 2020-10-27 PROCEDURE — 85025 COMPLETE CBC W/AUTO DIFF WBC: CPT

## 2020-10-27 PROCEDURE — 6360000004 HC RX CONTRAST MEDICATION: Performed by: STUDENT IN AN ORGANIZED HEALTH CARE EDUCATION/TRAINING PROGRAM

## 2020-10-27 PROCEDURE — 81001 URINALYSIS AUTO W/SCOPE: CPT

## 2020-10-27 ASSESSMENT — PAIN DESCRIPTION - PROGRESSION: CLINICAL_PROGRESSION: GRADUALLY WORSENING

## 2020-10-27 ASSESSMENT — PAIN DESCRIPTION - ONSET: ONSET: SUDDEN

## 2020-10-27 ASSESSMENT — PAIN DESCRIPTION - PAIN TYPE: TYPE: ACUTE PAIN

## 2020-10-27 ASSESSMENT — PAIN SCALES - GENERAL
PAINLEVEL_OUTOF10: 2
PAINLEVEL_OUTOF10: 0
PAINLEVEL_OUTOF10: 0

## 2020-10-27 ASSESSMENT — PAIN - FUNCTIONAL ASSESSMENT: PAIN_FUNCTIONAL_ASSESSMENT: ACTIVITIES ARE NOT PREVENTED

## 2020-10-27 ASSESSMENT — PAIN DESCRIPTION - ORIENTATION: ORIENTATION: LOWER

## 2020-10-27 ASSESSMENT — PAIN DESCRIPTION - LOCATION: LOCATION: ABDOMEN

## 2020-10-27 ASSESSMENT — PAIN DESCRIPTION - DESCRIPTORS: DESCRIPTORS: CRAMPING

## 2020-10-27 ASSESSMENT — PAIN DESCRIPTION - FREQUENCY: FREQUENCY: CONTINUOUS

## 2020-10-27 NOTE — ED PROVIDER NOTES
Primary Care Physician: Ayush Diamond MD   Attending Physician: No att. providers found     History   Chief Complaint   Patient presents with    Vaginal Bleeding     bleeding after sex since Saturday, passing large clots now. on anticoags        HPI   Macario Ortez is a 60 y.o. female history of A. fib, coronary artery disease status post stent, CVA, congestive heart failure, chronic kidney disease, COPD, fibromyalgia presenting this afternoon complaining of vaginal bleeding which started 3 days ago. That her symptoms started shortly after she had intercourse. She has not had intercourse for the past 3 years since she lost her . Now she has clots with some abdominal cramping. Has any discharge, nausea, vomiting or fevers. No concerns for STDs. States that he had total hysterectomy 20 years ago and since then she has not had her. . No history of ovarian or uterine cancer.     Past Medical History:   Diagnosis Date    Acid reflux     Anemia     Anxiety     Arthritis     Asthma     Atrial fibrillation (HCC)     Blood transfusion reaction     CAD (coronary artery disease) 12/3/2012    Cerebral artery occlusion with cerebral infarction ([address]     [facility]     Chronic kidney disease     40% kidney functiom    COPD (chronic obstructive pulmonary disease) ([facility])     Depression     DM2 (diabetes mellitus, type 2) ([address]     Dysarthria     Fibromyalgia 6/7/2016    Headache(784.0) 2/19/2013    Hemisensory loss     Hyperlipidemia     Hypertension     IBS (irritable bowel syndrome)     Inferior vena cava occlusion (HCC)     Irritable bowel syndrome     Keratitis     MI, old     Neuropathy     Superior vena cava obstruction     Temporal arteritis [facility] 2/24/2014        Past Surgical History:   Procedure Laterality Date    ABLATION OF DYSRHYTHMIC FOCUS      ARTERY BIOPSY Right 04/23/2014    RIGHT TEMPORAL ARTERY BIOPSY    CATARACT REMOVAL Bilateral     CHOLECYSTECTOMY      CORONARY ANGIOPLASTY WITH STENT PLACEMENT      CORONARY ANGIOPLASTY WITH STENT PLACEMENT      HYSTERECTOMY      JOINT REPLACEMENT Right     KNEE ARTHROSCOPY Right     KNEE SURGERY      right knee replacement    PTCA  10/2019    LAD and RCA inrtervention    TUNNELED VENOUS PORT PLACEMENT      left thigh. SMART PORT    UPPER GASTROINTESTINAL ENDOSCOPY N/A 2020    EGD DIAGNOSTIC ONLY performed by Edgar Cortes MD at [facility]          Family History   Problem Relation Age of Onset    Diabetes Mother     Hypertension Mother     High Cholesterol Mother     Stroke Mother     Cancer Mother     No Known Problems Paternal Grandfather         lung issues         Social History     Socioeconomic History    Marital status:       Spouse name: None    Number of children: 6    Years of education: None    Highest education level: None   Occupational History    None   Social Needs    Financial resource strain: None    Food insecurity     Worry: None     Inability: None    Transportation needs     Medical: None     Non-medical: None   Tobacco Use    Smoking status: Former Smoker     Packs/day: 0.50     Years: 35.00     Pack years: 17.50     Types: Cigarettes     Last attempt to quit: 2018     Years since quittin.3    Smokeless tobacco: Former User    Tobacco comment: 5/13/15 has not smoked since hospitalization - kh   Substance and Sexual Activity    Alcohol use: No     Alcohol/week: 0.0 standard drinks    Drug use: No    Sexual activity: Yes     Partners: Male   Lifestyle    Physical activity     Days per week: None     Minutes per session: None    Stress: None   Relationships    Social connections     Talks on phone: None     Gets together: None     Attends Bahai service: None     Active member of club or organization: None     Attends meetings of clubs or organizations: None     Relationship status: None    Intimate partner violence     Fear of current or ex partner: None     Emotionally abused: None     Physically abused: None     Forced sexual activity: None   Other Topics Concern    None   Social History Narrative    None        Review of Systems   10 total systems reviewed and found to be negative unless otherwise noted in HPI     Physical Exam   BP (!) 144/51   Pulse 89   Temp 98.8 °F (37.1 °C) (Oral)   Resp 17   Ht 5' (1.524 m)   Wt 129 lb 6.6 oz (58.7 kg)   SpO2 100%   BMI 25.27 kg/m²      CONSTITUTIONAL: Well appearing, in no acute distress   HEAD: atraumatic, normocephalic   EYES: PERRL, No injection, discharge or scleral icterus. ENT: Moist mucous membranes. NECK: Normal ROM, NO LAD   CARDIOVASCULAR: Regular rate and rhythm. No murmurs or gallop. PULMONARY/CHEST: Airway patent. No retractions. Breath sounds clear with good air entry bilaterally. ABDOMEN: Soft, Non-distended and non-tender, without guarding or rebound.  with atrophy of the vaginal walls but no significant bleeding  SKIN: Acyanotic, warm, dry   MUSCULOSKELETAL: No swelling, tenderness or deformity   NEUROLOGICAL: Awake and oriented x 3. Pulses intact. Grossly nonfocal   Nursing note and vitals reviewed.      ED Course & Medical Decision Making   Medications - No data to display   Labs Reviewed   CBC WITH AUTO DIFFERENTIAL - Abnormal; Notable for the following components:       Result Value    RBC 3.01 (*)     Hemoglobin 9.7 (*)     Hematocrit 29.5 (*)     Vacuolated Neutrophils Present (*)     Anisocytosis Occasional (*)     Hypochromia Occasional (*)     All other components within normal limits    Narrative:     Performed at:  [address] [address]   Phone [phone]   COMPREHENSIVE METABOLIC PANEL W/ REFLEX TO MG FOR LOW K - Abnormal; Notable for the following components:    Glucose 176 (*)     GFR Non- 50 (*)     All other components within normal Coronary artery stents. Heart and Mediastinum: Cardiomediastinal silhouette is within normal limits. Lungs and Pleura: Lungs are clear. No pleural abnormalities evident. Bones and soft tissues: No acute abnormalities. IMPRESSION: No acute cardiopulmonary abnormality. Report Verified by: Alexa Yap MD at 10/12/2020 10:18 AM EDT     Mri Head Wo Contrast    Result Date: 10/12/2020  EXAM: MRI BRAIN WO CONTRAST EXAM: MRA HEAD WO CONTRAST EXAM: MRA NECK WO CONTRAST INDICATION: Neuro deficit, acute, stroke suspected, TECHNIQUE:  MRI BRAIN WO CONTRAST performed including multiplanar T1 and T2 weighted sequences. 3D TOF MRA head was obtained without contrast with MIP performed at the scanner. 2D/3D time of  flight MRA of the neck was obtained. MIP was performed at the scanner. COMPARISON: Prior MR of the head 6/25/2013, 7/18/2014, MRA head 6/25/2013 FINDINGS: The examination is mildly limited by motion artifacts. Brain parenchyma: No areas of restricted diffusion are identified. Very few less than 10 punctate signal alterations each less than 3 mm in diameter are nonspecific. No evidence of intracranial hemorrhagic staining. Cerebellar tonsils extend minimally below the plane of foramen magnum due to mild cerebellar tonsillar ectopia with no definite Chiari malformation. The sella is empty, unchanged. Ventricles and extraaxial spaces: Proportionate enlargement of ventricles due to mild diffuse cortical atrophy. Marrow signal of calvarium and skull base: No marrow signal abnormality identified. Orbits, paranasal sinuses, and mastoid regions: Postoperative findings of cataract surgery are present. The paranasal sinuses are clear. The mastoid air cells are clear. The optic nerve sheaths are minimally prominent, similar to prior exam. Extracranial Carotid Systems: Normal flow signal is present in the bilateral common carotid, external carotid, and internal carotid arteries with normal flow signal direction.  Extracranial Vertebral Arteries: Normal flow signal is present within the bilateral vertebral arteries with normal flow signal direction. Anterior Circulation: Normal vessel signal present corresponding to the parasellar internal carotid arteries, A1 and A2 segments, M1 and M2 segments. No occlusive change seen. Posterior Circulation: Normal vessel signal present corresponding to the V4 segment vertebral arteries, PICA, anterior inferior cerebellar artery origins, SCA, and P1 and P2 segments. Venous structures: Normal flow voids corresponding to the dural venous sinuses. Extravascular structures included on MRA source images: Demonstrate patent origins of the great  vessels. IMPRESSION: Brain 1. No no acute ischemic or hemorrhagic changes. 2.  Minimal T2/FLAIR matter signal abnormalities are nonspecific, most likely related to chronic small vessel ischemia. 3.  Redemonstration of mild tonsillar ectopia, empty sella, and optic nerve sheath enlargement. Findings could be seen with idiopathic intracranial hypertension, correlate clinically. Cranial MRA No flow significant stenosis noted. Tiny infundibulum at the communicating segment o the right internal carotid artery. Extracranial Neck MRA No flow significant stenosis. Approved by Asya Hernandez DO on 10/12/2020 6:46 PM EDT I have personally reviewed the images and I agree with this report. Report Verified by: Shania Carbajal MD at 10/12/2020 8:09 PM EDT     Mra Head Wo Con    Result Date: 10/12/2020  EXAM: MRI BRAIN WO CONTRAST EXAM: MRA HEAD WO CONTRAST EXAM: MRA NECK WO CONTRAST INDICATION: Neuro deficit, acute, stroke suspected, TECHNIQUE:  MRI BRAIN WO CONTRAST performed including multiplanar T1 and T2 weighted sequences. 3D TOF MRA head was obtained without contrast with MIP performed at the scanner. 2D/3D time of  flight MRA of the neck was obtained. MIP was performed at the scanner.  COMPARISON: Prior MR of the head 6/25/2013, 7/18/2014, MRA head 6/25/2013 FINDINGS: The examination is mildly limited by motion artifacts. Brain parenchyma: No areas of restricted diffusion are identified. Very few less than 10 punctate signal alterations each less than 3 mm in diameter are nonspecific. No evidence of intracranial hemorrhagic staining. Cerebellar tonsils extend minimally below the plane of foramen magnum due to mild cerebellar tonsillar ectopia with no definite Chiari malformation. The sella is empty, unchanged. Ventricles and extraaxial spaces: Proportionate enlargement of ventricles due to mild diffuse cortical atrophy. Marrow signal of calvarium and skull base: No marrow signal abnormality identified. Orbits, paranasal sinuses, and mastoid regions: Postoperative findings of cataract surgery are present. The paranasal sinuses are clear. The mastoid air cells are clear. The optic nerve sheaths are minimally prominent, similar to prior exam. Extracranial Carotid Systems: Normal flow signal is present in the bilateral common carotid, external carotid, and internal carotid arteries with normal flow signal direction. Extracranial Vertebral Arteries: Normal flow signal is present within the bilateral vertebral arteries with normal flow signal direction. Anterior Circulation: Normal vessel signal present corresponding to the parasellar internal carotid arteries, A1 and A2 segments, M1 and M2 segments. No occlusive change seen. Posterior Circulation: Normal vessel signal present corresponding to the V4 segment vertebral arteries, PICA, anterior inferior cerebellar artery origins, SCA, and P1 and P2 segments. Venous structures: Normal flow voids corresponding to the dural venous sinuses. Extravascular structures included on MRA source images: Demonstrate patent origins of the great  vessels. IMPRESSION: Brain 1. No no acute ischemic or hemorrhagic changes. 2.  Minimal T2/FLAIR matter signal abnormalities are nonspecific, most likely related to chronic small vessel ischemia.  3. Redemonstration of mild tonsillar ectopia, empty sella, and optic nerve sheath enlargement. Findings could be seen with idiopathic intracranial hypertension, correlate clinically. Cranial MRA No flow significant stenosis noted. Tiny infundibulum at the communicating segment o the right internal carotid artery. Extracranial Neck MRA No flow significant stenosis. Approved by Yuliya Martniez DO on 10/12/2020 6:46 PM EDT I have personally reviewed the images and I agree with this report. Report Verified by: Adi Azar MD at 10/12/2020 8:09 PM EDT        PROCEDURES:   Procedures    ASSESSMENT AND PLAN:  Nguyen Dupree is a 60 y.o. female senting this evening a complaint of vaginal bleeding and pain after sexual intercourse. She does state that she is not had bleeding for a long time because she had hysterectomy. Also not had sexual intercourse for 3 years since the passing of her . Pain has been going on now for a few days. Exam there was no significant bleeding but some old blood in the vaginal vault as well as atrophy of the vaginal wall. Given vaginal bleeding in a postmenopausal woman I was concerned for endometriosis or uterine cancer. CT of the abdomen was obtained and showed no acute findings. Believe her bleeding is secondary to trauma from sexual intercourse. Stable and no indication for admission was discharged home to follow-up with primary care doctor. ClINICAL IMPRESSION:  1. Vaginal bleeding    2.  Anemia, unspecified type          PATIENT REFERRED TO:  Shiraz Sood MD  [facility]  [phone]    Schedule an appointment as soon as possible for a visit in 2 days        DISCHARGE MEDICATIONS:  Discharge Medication List as of 10/27/2020  4:02 PM        DISCONTINUED MEDICATIONS:  Discharge Medication List as of 10/27/2020  4:02 PM        DISPOSITION    Discharge home  -We have instructed the patient, (Nguyen Dupree) to return to the ED or call her PCP if her pain/symptoms worsen. -Findings and recommendations explained to patient. She expressed understanding and agreed with the plan. Tiburcio Gosselin, MD (electronically signed)  10/29/2020  _________________________________________________________________________________________  _________________________________________________________________________________________  This record is transcribed utilizing voice recognition technology. There are inherent limitations in this technology. In addition, there may be limitations in editing of this report. If there are any discrepancies, please contact me directly.         Tiburcio Gosselin, MD  10/29/20 [id number]

## 2020-10-28 LAB
C. TRACHOMATIS DNA ,URINE: NEGATIVE
N. GONORRHOEAE DNA, URINE: NEGATIVE

## 2020-10-30 RX ORDER — RIZATRIPTAN BENZOATE 10 MG/1
10 TABLET ORAL
Qty: 9 TABLET | Refills: 0 | OUTPATIENT
Start: 2020-10-30 | End: 2020-11-11 | Stop reason: SDUPTHER

## 2020-11-03 PROBLEM — R07.9 CHEST PAIN: Status: RESOLVED | Noted: 2020-05-11 | Resolved: 2020-11-03

## 2020-11-09 RX ORDER — OMEPRAZOLE 20 MG/1
20 CAPSULE, DELAYED RELEASE ORAL DAILY
Qty: 30 CAPSULE | Refills: 1 | Status: SHIPPED | OUTPATIENT
Start: 2020-11-09 | End: 2020-12-09

## 2020-11-11 ENCOUNTER — VIRTUAL VISIT (OUTPATIENT)
Dept: PAIN MANAGEMENT | Age: 65
End: 2020-11-11
Payer: COMMERCIAL

## 2020-11-11 PROCEDURE — G8428 CUR MEDS NOT DOCUMENT: HCPCS | Performed by: INTERNAL MEDICINE

## 2020-11-11 PROCEDURE — 2022F DILAT RTA XM EVC RTNOPTHY: CPT | Performed by: INTERNAL MEDICINE

## 2020-11-11 PROCEDURE — 3052F HG A1C>EQUAL 8.0%<EQUAL 9.0%: CPT | Performed by: INTERNAL MEDICINE

## 2020-11-11 PROCEDURE — 3017F COLORECTAL CA SCREEN DOC REV: CPT | Performed by: INTERNAL MEDICINE

## 2020-11-11 PROCEDURE — 99213 OFFICE O/P EST LOW 20 MIN: CPT | Performed by: INTERNAL MEDICINE

## 2020-11-11 RX ORDER — OXYCODONE AND ACETAMINOPHEN 7.5; 325 MG/1; MG/1
1 TABLET ORAL EVERY 6 HOURS PRN
Qty: 90 TABLET | Refills: 0 | Status: SHIPPED | OUTPATIENT
Start: 2020-11-11 | End: 2020-12-28 | Stop reason: SDUPTHER

## 2020-11-11 RX ORDER — RIZATRIPTAN BENZOATE 10 MG/1
10 TABLET ORAL
Qty: 9 TABLET | Refills: 0 | Status: SHIPPED | OUTPATIENT
Start: 2020-11-11 | End: 2020-12-28 | Stop reason: SDUPTHER

## 2020-11-11 RX ORDER — TIZANIDINE 4 MG/1
TABLET ORAL
Qty: 60 TABLET | Refills: 1 | Status: SHIPPED | OUTPATIENT
Start: 2020-11-11 | End: 2020-12-22

## 2020-11-11 RX ORDER — DULOXETIN HYDROCHLORIDE 60 MG/1
60 CAPSULE, DELAYED RELEASE ORAL DAILY
Qty: 30 CAPSULE | Refills: 1 | Status: SHIPPED | OUTPATIENT
Start: 2020-11-11 | End: 2020-12-22

## 2020-11-11 RX ORDER — ERENUMAB-AOOE 70 MG/ML
140 INJECTION SUBCUTANEOUS
Qty: 1 PEN | Refills: 1 | Status: SHIPPED | OUTPATIENT
Start: 2020-11-11 | End: 2020-12-28 | Stop reason: SDUPTHER

## 2020-11-11 RX ORDER — DIVALPROEX SODIUM 250 MG/1
TABLET, EXTENDED RELEASE ORAL
Qty: 90 TABLET | Refills: 1 | Status: SHIPPED | OUTPATIENT
Start: 2020-11-11 | End: 2020-12-22

## 2020-11-11 RX ORDER — QUETIAPINE FUMARATE 25 MG/1
25-50 TABLET, FILM COATED ORAL NIGHTLY
Qty: 60 TABLET | Refills: 1 | Status: SHIPPED | OUTPATIENT
Start: 2020-11-11 | End: 2020-12-28 | Stop reason: SDUPTHER

## 2020-11-11 NOTE — PROGRESS NOTES
the pain. She denies any side effects from the current pain regimen. Patient reports that since the last follow up visit the physical functioning is unchanged, family/social relationships are unchanged, mood is unchanged sleep patterns are unchanged, and that the overall functioning is unchanged. Patient denies misusing/abusing her narcotic pain medications or using any illegal drugs. There are No indicators for possible drug abuse, addiction or diversion problems. Ms. Deep Schmidt states she has been doing fair, pain has been baseline. She states she is having increase headaches, she is using Aimovig along with Depakote ER and Maxalt. Patient reports her blood sugar has been around 150+range. She says she is managing light house chores. ALLERGIES: Patients list of allergies were reviewed     MEDICATIONS: Ms. Deep Schmidt list of medications were reviewed. Her current medications are   Outpatient Medications Prior to Visit   Medication Sig Dispense Refill    omeprazole (PRILOSEC) 20 MG delayed release capsule TAKE 1 CAPSULE BY MOUTH DAILY 30 capsule 1    oxyCODONE-acetaminophen (PERCOCET) 7.5-325 MG per tablet Take 1 tablet by mouth every 6 hours as needed for Pain (max 3-4 day) for up to 28 days.  84 tablet 0    tiZANidine (ZANAFLEX) 4 MG tablet Take 1/2-1 tablet po BID 60 tablet 1    divalproex (DEPAKOTE ER) 250 MG extended release tablet Take 1 tablet po in the morning, take 2 tablets po in the evening 90 tablet 1    Erenumab-aooe (AIMOVIG, 140 MG DOSE,) 70 MG/ML SOAJ Inject 140 mLs into the skin every 30 days 1 pen 1    insulin glargine (BASAGLAR KWIKPEN) 100 UNIT/ML injection pen Inject 40 Units into the skin nightly 15 mL 5    insulin aspart (NOVOLOG FLEXPEN) 100 UNIT/ML injection pen Inject 5-12 Units into the skin 3 times daily (before meals) Sugar < 150   Zero Unit  151-200   5 U   201- 250   8 U   251-300    12 U 7 pen 5    torsemide (DEMADEX) 10 MG tablet Take 1 tablet by mouth daily 90 tablet 5    hydrALAZINE (APRESOLINE) 50 MG tablet Take 1 tablet by mouth 2 times daily 60 tablet 5    metoprolol succinate (TOPROL XL) 50 MG extended release tablet Take 0.5 tablets by mouth 2 times daily (with meals) 30 tablet 5    isosorbide mononitrate (IMDUR) 30 MG extended release tablet Take 1 tablet by mouth daily 90 tablet 5    atorvastatin (LIPITOR) 80 MG tablet Take 1 tablet by mouth nightly 90 tablet 5    amLODIPine (NORVASC) 5 MG tablet Take 1 tablet by mouth 2 times daily 180 tablet 5    lidocaine (XYLOCAINE) 5 % ointment Apply topically as needed.  5 g 3    blood glucose test strips (TRUE METRIX BLOOD GLUCOSE TEST) strip 1 each by Does not apply route 2 times daily 100 each 5    Blood Glucose Monitoring Suppl (TRUE METRIX METER) w/Device KIT 1 kit by Does not apply route 2 times daily 1 kit 0    ondansetron (ZOFRAN) 4 MG tablet Take 1 tablet by mouth 3 times daily as needed for Nausea or Vomiting 30 tablet 0    ASPIRIN LOW DOSE 81 MG EC tablet TAKE 1 TABLET BY MOUTH DAILY 90 tablet 5    clopidogrel (PLAVIX) 75 MG tablet TAKE 1 TABLET BY MOUTH DA JULIEN 90 tablet 5    DULoxetine (CYMBALTA) 60 MG extended release capsule Take 1 capsule by mouth daily 30 capsule 1    QUEtiapine (SEROQUEL) 25 MG tablet Take 1-2 tablets by mouth nightly 60 tablet 1    UltiCare Alcohol Swabs 70 % PADS USE TO TEST BLOOD GLUCOSE THREE TIMES DAILY 100 each 5    pantoprazole (PROTONIX) 40 MG tablet Take 1 tablet by mouth every morning (before breakfast) 30 tablet 3    ondansetron (ZOFRAN) 4 MG tablet Take 1 tablet by mouth 3 times daily as needed for Nausea or Vomiting 30 tablet 0    Nebulizers (COMPRESSOR/NEBULIZER) MISC 1 Device by Does not apply route 4 times daily as needed (SOB / Wheezing) 1 each 0    albuterol (PROVENTIL) (2.5 MG/3ML) 0.083% nebulizer solution Take 3 mLs by nebulization every 4 hours as needed for Wheezing 120 each 5    Lancet Devices (SIMPLE DIAGNOSTICS LANCING DEV) MISC USE WITH LANCETS TO TEST BLOOD GLUCOSE 1 each 11    Pharmacist Choice Lancets MISC USE TO TEST BLOOD GLUCOSE THREE TIMES DAILY 300 each 5    UNIFINE PENTIPS 31G X 8 MM MISC USE WITH insulin pens 100 each 5    budesonide-formoterol (SYMBICORT) 160-4.5 MCG/ACT AERO Inhale 2 puffs into the lungs daily 2 Inhaler 5    albuterol sulfate HFA (VENTOLIN HFA) 108 (90 Base) MCG/ACT inhaler Inhale 2 puffs into the lungs every 4 hours as needed for Wheezing or Shortness of Breath 3 Inhaler 5    ranolazine (RANEXA) 1000 MG extended release tablet Take 1 tablet by mouth 2 times daily 60 tablet 8    nitroGLYCERIN (NITROSTAT) 0.4 MG SL tablet Place 1 tablet under the tongue every 5 minutes as needed for Chest pain up to max of 3 total doses. If no relief after 1 dose, call 911. 25 tablet 3    rizatriptan (MAXALT) 10 MG tablet Take 1 tablet by mouth once as needed for Migraine May repeat in 2 hours if needed 9 tablet 0    gabapentin (NEURONTIN) 600 MG tablet Take 1 tablet by mouth nightly for 30 days. 90 tablet 1    SUMAtriptan (IMITREX) 50 MG tablet Take 1 tablet by mouth once as needed for Migraine 9 tablet 0     No facility-administered medications prior to visit. SOCIAL/FAMILY/PAST MEDICAL HISTORY: Ms. Brandee Briscoe, family and past medical history was reviewed. REVIEW OF SYSTEMS:    Respiratory: Negative for apnea, chest tightness and shortness of breath or change in baseline breathing. Gastrointestinal: Negative for nausea, vomiting, abdominal pain, diarrhea, constipation, blood in stool and abdominal distention. PHYSICAL EXAM:   Nursing note and vitals per patient reviewed. There were no vitals taken for this visit. Constitutional: She appears well-developed and well-nourished. No acute distress. No respiratory distress. Skin: Skin appears to be warm and dry. No rashes or any other marks noted. She is not diaphoretic.   Respiratory/Pulmonary: NO conversational dyspnea, no accessory muscle use, no coughing during exam. She does not appear to be in labored breathing. Neurological/Psychiatric:She is alert and oriented to person, place, and time. Coordination is  normal.  Her mood isAppropriate and affect is flat/blunted/anxious. Musculoskeletal / Extremities: Range of motion is normal. Gait is normal, assistive devices use: none. IMPRESSION:   1. Chronic pain syndrome    2. DDD (degenerative disc disease), lumbar    3. Fibromyalgia    4. Chronic painful diabetic neuropathy ([address]    5. Tendinitis of right rotator cuff    6. Rotator cuff tendonitis, right    7. DDD (degenerative disc disease), cervical        PLAN:  Informed verbal consent regarding treatment was obtained  -[place] was obtained and reviewed  for the last one year and no indicators of drug misuse  were found. Any other controlled substance prescriptions  seen on the record have been accounted for, I am aware of the patient receiving these medications. Lalo Lawson [place] record will be rechecked as part of office protocol.    -continue with current opioid regimen Percocet 3 per day  -she was advised  to avoid using too many OTC analgesics to control the headaches, avoid chocolates, increased caffeine, cheeses and MSG nitrite containing foods, cigarette smoking. To avoid bright lights, strong smells and skipping meals. Continue with Aimovig and Maxalt  -walking/stretching exercises as advised        Current Outpatient Medications   Medication Sig Dispense Refill    omeprazole (PRILOSEC) 20 MG delayed release capsule TAKE 1 CAPSULE BY MOUTH DAILY 30 capsule 1    oxyCODONE-acetaminophen (PERCOCET) 7.5-325 MG per tablet Take 1 tablet by mouth every 6 hours as needed for Pain (max 3-4 day) for up to 28 days.  84 tablet 0    tiZANidine (ZANAFLEX) 4 MG tablet Take 1/2-1 tablet po BID 60 tablet 1    divalproex (DEPAKOTE ER) 250 MG extended release tablet Take 1 tablet po in the morning, take 2 tablets po in the evening 90 tablet 1    Erenumab-aooe (AIMOVIG, 140 MG DOSE,) 70 MG/ML SOAJ Inject 140 mLs into the skin every 30 days 1 pen 1    insulin glargine (BASAGLAR KWIKPEN) 100 UNIT/ML injection pen Inject 40 Units into the skin nightly 15 mL 5    insulin aspart (NOVOLOG FLEXPEN) 100 UNIT/ML injection pen Inject 5-12 Units into the skin 3 times daily (before meals) Sugar < 150   Zero Unit  151-200   5 U   201- 250   8 U   251-300    12 U 7 pen 5    torsemide (DEMADEX) 10 MG tablet Take 1 tablet by mouth daily 90 tablet 5    hydrALAZINE (APRESOLINE) 50 MG tablet Take 1 tablet by mouth 2 times daily 60 tablet 5    metoprolol succinate (TOPROL XL) 50 MG extended release tablet Take 0.5 tablets by mouth 2 times daily (with meals) 30 tablet 5    isosorbide mononitrate (IMDUR) 30 MG extended release tablet Take 1 tablet by mouth daily 90 tablet 5    atorvastatin (LIPITOR) 80 MG tablet Take 1 tablet by mouth nightly 90 tablet 5    amLODIPine (NORVASC) 5 MG tablet Take 1 tablet by mouth 2 times daily 180 tablet 5    lidocaine (XYLOCAINE) 5 % ointment Apply topically as needed.  5 g 3    blood glucose test strips (TRUE METRIX BLOOD GLUCOSE TEST) strip 1 each by Does not apply route 2 times daily 100 each 5    Blood Glucose Monitoring Suppl (TRUE METRIX METER) w/Device KIT 1 kit by Does not apply route 2 times daily 1 kit 0    ondansetron (ZOFRAN) 4 MG tablet Take 1 tablet by mouth 3 times daily as needed for Nausea or Vomiting 30 tablet 0    ASPIRIN LOW DOSE 81 MG EC tablet TAKE 1 TABLET BY MOUTH DAILY 90 tablet 5    clopidogrel (PLAVIX) 75 MG tablet TAKE 1 TABLET BY MOUTH DA JULIEN 90 tablet 5    DULoxetine (CYMBALTA) 60 MG extended release capsule Take 1 capsule by mouth daily 30 capsule 1    QUEtiapine (SEROQUEL) 25 MG tablet Take 1-2 tablets by mouth nightly 60 tablet 1    UltiCare Alcohol Swabs 70 % PADS USE TO TEST BLOOD GLUCOSE THREE TIMES DAILY 100 each 5    pantoprazole (PROTONIX) 40 MG tablet Take 1 tablet by mouth every morning (before breakfast) 30 tablet 3    ondansetron (ZOFRAN) 4 MG tablet Take 1 tablet by mouth 3 times daily as needed for Nausea or Vomiting 30 tablet 0    Nebulizers (COMPRESSOR/NEBULIZER) MISC 1 Device by Does not apply route 4 times daily as needed (SOB / Wheezing) 1 each 0    albuterol (PROVENTIL) (2.5 MG/3ML) 0.083% nebulizer solution Take 3 mLs by nebulization every 4 hours as needed for Wheezing 120 each 5    Lancet Devices (SIMPLE DIAGNOSTICS LANCING DEV) MISC USE WITH LANCETS TO TEST BLOOD GLUCOSE 1 each 11    Pharmacist Choice Lancets MISC USE TO TEST BLOOD GLUCOSE THREE TIMES DAILY 300 each 5    UNIFINE PENTIPS 31G X 8 MM MISC USE WITH insulin pens 100 each 5    budesonide-formoterol (SYMBICORT) 160-4.5 MCG/ACT AERO Inhale 2 puffs into the lungs daily 2 Inhaler 5    albuterol sulfate HFA (VENTOLIN HFA) 108 (90 Base) MCG/ACT inhaler Inhale 2 puffs into the lungs every 4 hours as needed for Wheezing or Shortness of Breath 3 Inhaler 5    ranolazine (RANEXA) 1000 MG extended release tablet Take 1 tablet by mouth 2 times daily 60 tablet 8    nitroGLYCERIN (NITROSTAT) 0.4 MG SL tablet Place 1 tablet under the tongue every 5 minutes as needed for Chest pain up to max of 3 total doses. If no relief after 1 dose, call 911. 25 tablet 3    rizatriptan (MAXALT) 10 MG tablet Take 1 tablet by mouth once as needed for Migraine May repeat in 2 hours if needed 9 tablet 0    gabapentin (NEURONTIN) 600 MG tablet Take 1 tablet by mouth nightly for 30 days. 90 tablet 1    SUMAtriptan (IMITREX) 50 MG tablet Take 1 tablet by mouth once as needed for Migraine 9 tablet 0     No current facility-administered medications for this visit. I will continue her current medication regimen  which is part of the above treatment schedule. It has been helping with Ms. Meza's chronic  medical problems which for this visit include:   Diagnoses of Chronic pain syndrome, DDD (degenerative disc disease), lumbar, Fibromyalgia, Chronic painful diabetic neuropathy [facility]), Tendinitis of right rotator cuff, Intractable migraine with aura with status migrainosus, Rotator cuff tendonitis, right, DDD (degenerative disc disease), cervical, and Intractable migraine with aura without status migrainosus were pertinent to this visit. Risks and benefits of the medications and other alternative treatments  including no treatment were discussed with the patient. The common side effects of these medications were also explained to the patient. Informed verbal consent was obtained. Goals of current treatment regimen include improvement in pain, restoration of functioning- with focus on improvement in physical performance, general activity, work or disability,emotional distress, health care utilization and  decreased medication consumption. Will continue to monitor progress towards achieving/maintaining therapeutic goals with special emphasis on  1. Improvement in perceived interfernce  of pain with ADL's. Ability to do home exercises independently. Ability to do household chores indoor and/or outdoor work and social and leisure activities. Improve psychosocial and physical functioning. - she is showing progression towards this treatment goal with the current regimen. She was advised against drinking alcohol with the narcotic pain medicines, advised against driving or handling machinery while adjusting the dose of medicines or if having cognitive  issues related to the current medications. Risk of overdose and death, if medicines not taken as prescribed, were also discussed. If the patient develops new symptoms or if the symptoms worsen, the patient should call the office. While transcribing every attempt was made to maintain the accuracy of the note in terms of it's contents,there may have been some errors made inadvertently. Thank you for allowing me to participate in the care of this patient.     Radha Kasper MD.    Cc: Lizandro Iyer MD

## 2020-11-23 ENCOUNTER — TELEPHONE (OUTPATIENT)
Dept: PAIN MANAGEMENT | Age: 65
End: 2020-11-23

## 2020-11-23 RX ORDER — METHOCARBAMOL 500 MG/1
500 TABLET, FILM COATED ORAL 2 TIMES DAILY
Qty: 60 TABLET | Refills: 1 | OUTPATIENT
Start: 2020-11-23

## 2020-11-23 RX ORDER — TIZANIDINE 4 MG/1
TABLET ORAL
Qty: 60 TABLET | Refills: 1 | OUTPATIENT
Start: 2020-11-23

## 2020-11-23 NOTE — TELEPHONE ENCOUNTER
Patient states her last appt w/[facility] was 11/11 and did not call back to schedule her next f/u appt and nothing is available. Pl's advise patient.

## 2020-12-02 ENCOUNTER — TELEPHONE (OUTPATIENT)
Dept: PRIMARY CARE CLINIC | Age: 65
End: 2020-12-02

## 2020-12-09 RX ORDER — OMEPRAZOLE 20 MG/1
20 CAPSULE, DELAYED RELEASE ORAL DAILY
Qty: 30 CAPSULE | Refills: 1 | Status: SHIPPED | OUTPATIENT
Start: 2020-12-09 | End: 2021-02-05

## 2020-12-14 ENCOUNTER — TELEPHONE (OUTPATIENT)
Dept: ADMINISTRATIVE | Age: 65
End: 2020-12-14

## 2020-12-14 NOTE — TELEPHONE ENCOUNTER
Called and she states a form will be coming over for the dr to complete for this new home care agency

## 2020-12-15 ENCOUNTER — TELEPHONE (OUTPATIENT)
Dept: PRIMARY CARE CLINIC | Age: 65
End: 2020-12-15

## 2020-12-16 RX ORDER — LACTOSE-REDUCED FOOD
1 LIQUID (ML) ORAL 3 TIMES DAILY PRN
Qty: 90 CAN | Refills: 5 | Status: ON HOLD | OUTPATIENT
Start: 2020-12-16 | End: 2022-01-10

## 2020-12-16 RX ORDER — NITROGLYCERIN 0.4 MG/1
0.4 TABLET SUBLINGUAL EVERY 5 MIN PRN
Qty: 25 TABLET | Refills: 3 | Status: SHIPPED | OUTPATIENT
Start: 2020-12-16

## 2020-12-16 NOTE — TELEPHONE ENCOUNTER
Verbal ok was given    Pt needs a refill on the pended medications sent to [facility]- please sign

## 2020-12-18 ENCOUNTER — VIRTUAL VISIT (OUTPATIENT)
Dept: PRIMARY CARE CLINIC | Age: 65
End: 2020-12-18
Payer: COMMERCIAL

## 2020-12-18 PROCEDURE — 99496 TRANSJ CARE MGMT HIGH F2F 7D: CPT | Performed by: INTERNAL MEDICINE

## 2020-12-18 NOTE — PROGRESS NOTES
2020    Jerald Jessica (:  1955) is a 60 y.o. female, here for evaluation of the following medical concerns:    No chief complaint on file. Transition of care hospital follow-up    HPI  80-year-old -American female with multi- vessel coronary artery disease previously stented in 10/2019 with risk factors of suboptimally controlled diabetes (A1c 8.9% October), hypertension, hyperlipidemia, prior nicotine dependence who presented to [facility]  for apparent unstable angina, which she underwent angioplasty to an obtuse marginal artery and angioplasty with stenting to her right PDA, dismissed next day on Plavix and aspirin, started on 10 mg empagliflozin seemingly otherwise continued on her other medications including atorvastatin hydralazine amlodipine metoprolol ranolazine torsemide Basaglar NovoLog Isordil. She was in the ER the day after dismissal for chest pain, referred to outpatient cardiology. Unfortunately her chest pain continued and she was admitted to [facility] on  through  with planned interventional revascularization of the ballooned but unstented OM. Apparently at time of first angiogram at [facility], her 2019 stents x5 were deemed sufficiently patent. Unfortunately according to the discharge summary her procedure was canceled after she developed coffee-ground emesis, transfusion requiring, without obvious bleed source on EGD x2 (LA grade B esophagitis, grossly mild gastritis biopsied). Unclear if patient was in the Cath Lab at time of vomiting. Cath Lab report in the last most recent discharge summary suggests ostial stenosis of the 90% ostial RPDA stenosis, mid stent fracture of the mid OM1 stent resulting in 70% restenosis, medial branch OM1 70% ostial stenosis though 2 mm vessel. EF estimated at 75%.   I suspect this was copied from the [facility] hospitalization, as it discusses ballooning of the OM1 and stenting of the ostial PDA lesion. Dismissed on Plavix, baby aspirin held with instructions to resume around January 21, atorvastatin, metoprolol Ranexa torsemide empagliflozin Basaglar NovoLog. Isordil was continued at 40 mg 3 times daily. Lisinopril was apparently discontinued for unclear reasons. Omeprazole was switched to Protonix 40 mg twice daily for 3 months. For apparent marginal hypotension, hydralazine amlodipine lisinopril and torsemide were held. I do not have any direct access to interval echo or lab results from these hospitalizations. What I gleaned from discharge summary lists most recent creatinine at 3.0 on December 9, improving to 2.6 on December 10; hemoglobin in the low 7 range. Apparently received fosfomycin for UTI. Patient has little recollection of the second time reviewing the course of hospitalization only coffee-ground emesis requiring transfusion complicated by acute on chronic renal failure resulting in cancellation of contemplated attempted stenting of the recently ballooned OM1 lesion. Since returning home, she has not had a bowel movement. She had something of a loose stool on the day of dismissal perhaps due to laxative administration. She denies abdominal pain, nausea or vomiting, melena or blood per rectum (no bowel movement). Since returning home she feels that her urine is looking better, denies dysuria, fever (treat for UTI at hospital). Since returning home she confirms she is not taking aspirin and is taking twice daily Protonix. She confirmed that she is not taking hydralazine or amlodipine or torsemide. He does not check her blood pressure at home. She has chronic chest pain, possibly noncardiac, and that she picked up prescription for sublingual nitro and took 1 nitro today, with relief in under 5 seconds. She confirms compliance with 40 mg 3 times daily Isordil as well as Ranexa. She tells me that her appetite is not at baseline.  She reports that she is checking her sugars twice daily, averaging around 100  in the morning 160 in the evening. She denies symptomatic hypoglycemia. She has not resumed the [place]. Review of Systems   Constitutional: Negative for activity change, appetite change, fatigue and unexpected weight change. HENT: Negative for dental problem, sinus pain, sore throat and trouble swallowing. Eyes: Negative for pain and visual disturbance. Respiratory: Negative for apnea, cough, chest tightness, shortness of breath and wheezing. Cardiovascular: Negative for  palpitations. Gastrointestinal: Negative for abdominal pain, blood in stool, constipation, diarrhea, nausea, rectal pain and vomiting. Endocrine: Negative for cold intolerance, heat intolerance, polydipsia, polyphagia and polyuria. Genitourinary: Negative for difficulty urinating, dysuria, flank pain, frequency, hematuria, pelvic pain, urgency, vaginal bleeding and vaginal discharge. Musculoskeletal: Negative for arthralgias, back pain, gait problem, joint swelling, myalgias, neck pain and neck stiffness. Skin: Negative for color change and rash. Neurological: Negative for dizziness, tremors, syncope, speech difficulty, weakness, light-headedness and headaches. Hematological: Negative for adenopathy. Does not bruise/bleed easily. Psychiatric/Behavioral: Negative for agitation, behavioral problems, decreased concentration, sleep disturbance and suicidal ideas. The patient is not nervous/anxious and is not hyperactive. Prior to Visit Medications    Medication Sig Taking? Authorizing Provider   nitroGLYCERIN (NITROSTAT) 0.4 MG SL tablet Place 1 tablet under the tongue every 5 minutes as needed for Chest pain up to max of 3 total doses. If no relief after 1 dose, call 911.   Brian Pinedo MD   Nutritional Supplements (ENSURE) LIQD Take 1 Can by mouth 3 times daily as needed (nutrition)  Brian Pinedo MD   omeprazole (PRILOSEC) 20 MG delayed release capsule TAKE 1 CAPSULE BY MOUTH DAILY  Hugh Arredondo MD   rizatriptan (MAXALT) 10 MG tablet Take 1 tablet by mouth once as needed for Migraine May repeat in 2 hours if needed  Laurel Pablo MD   tiZANidine (ZANAFLEX) 4 MG tablet Take 1/2-1 tablet po BID  Laurel Pablo MD   divalproex (DEPAKOTE ER) 250 MG extended release tablet Take 1 tablet po in the morning, take 2 tablets po in the evening  Laurel Pablo MD   Erenumab-aooe (AIMOVIG, 140 MG DOSE,) 70 MG/ML SOAJ Inject 140 mLs into the skin every 30 days  Laurel Pablo MD   DULoxetine (CYMBALTA) 60 MG extended release capsule Take 1 capsule by mouth daily  Laurel Pablo MD   QUEtiapine (SEROQUEL) 25 MG tablet Take 1-2 tablets by mouth nightly  Laurel Pablo MD   insulin glargine (BASAGLAR KWIKPEN) 100 UNIT/ML injection pen Inject 40 Units into the skin nightly  Hugh Arredondo MD   insulin aspart (NOVOLOG FLEXPEN) 100 UNIT/ML injection pen Inject 5-12 Units into the skin 3 times daily (before meals) Sugar < 150   Zero Unit  151-200   5 U   201- 250   8 U   251-300    12 U  Hugh Arredondo MD   torsemide (DEMADEX) 10 MG tablet Take 1 tablet by mouth daily  Hugh Arredondo MD   hydrALAZINE (APRESOLINE) 50 MG tablet Take 1 tablet by mouth 2 times daily  Hugh Arredondo MD   metoprolol succinate (TOPROL XL) 50 MG extended release tablet Take 0.5 tablets by mouth 2 times daily (with meals)  Hugh Arredondo MD   isosorbide mononitrate (IMDUR) 30 MG extended release tablet Take 1 tablet by mouth daily  Hugh Arredondo MD   atorvastatin (LIPITOR) 80 MG tablet Take 1 tablet by mouth nightly  Hugh Arredondo MD   amLODIPine (NORVASC) 5 MG tablet Take 1 tablet by mouth 2 times daily  Hugh Arredondo MD   lidocaine (XYLOCAINE) 5 % ointment Apply topically as needed.   Hugh Arredondo MD   blood glucose test strips (TRUE METRIX BLOOD GLUCOSE TEST) strip 1 each by Does not apply route 2 times daily  Hugh Arredondo MD   Blood Glucose Monitoring Suppl (TRUE METRIX METER) w/Device KIT 1 kit by Does not apply route 2 times daily  Dolores Kam MD   ondansetron (ZOFRAN) 4 MG tablet Take 1 tablet by mouth 3 times daily as needed for Nausea or Vomiting  Dolores Kam MD   ASPIRIN LOW DOSE 81 MG EC tablet TAKE 1 TABLET BY MOUTH DAILY  Dolores Kam MD   clopidogrel (PLAVIX) 75 MG tablet TAKE 1 TABLET BY MOUTH DA JULIEN  Dolores Kam MD   UltiCare Alcohol Swabs 70 % PADS USE TO TEST BLOOD GLUCOSE THREE TIMES DAILY  Dolores Kam MD   pantoprazole (PROTONIX) 40 MG tablet Take 1 tablet by mouth every morning (before breakfast)  Fredo Romo MD   ondansetron (ZOFRAN) 4 MG tablet Take 1 tablet by mouth 3 times daily as needed for Nausea or Vomiting  Dolores Kam MD   Nebulizers (COMPRESSOR/NEBULIZER) MISC 1 Device by Does not apply route 4 times daily as needed (SOB / Wheezing)  Dolores Kam MD   albuterol (PROVENTIL) (2.5 MG/3ML) 0.083% nebulizer solution Take 3 mLs by nebulization every 4 hours as needed for Wheezing  Dolores Kam MD   Lancet Devices (SIMPLE DIAGNOSTICS LANCING DEV) MISC USE WITH LANCETS TO TEST BLOOD GLUCOSE  Dolores Kam MD   Pharmacist Choice Lancets MISC USE TO TEST BLOOD GLUCOSE THREE TIMES DAILY  Dolores Kam MD   UNIFINE PENTIPS 31G X 8 MM MISC USE WITH insulin pens  Dolores Kam MD   budesonide-formoterol (SYMBICORT) 160-4.5 MCG/ACT AERO Inhale 2 puffs into the lungs daily  Dolores Kam MD   albuterol sulfate HFA (VENTOLIN HFA) 108 (90 Base) MCG/ACT inhaler Inhale 2 puffs into the lungs every 4 hours as needed for Wheezing or Shortness of Breath  Dolores Kam MD   ranolazine (RANEXA) 1000 MG extended release tablet Take 1 tablet by mouth 2 times daily  Dolores Kam MD        No Known Allergies    Past Medical History:   Diagnosis Date    Acid reflux     Anemia     Anxiety     Arthritis     Asthma     Atrial fibrillation [facility] [address]     Blood transfusion reaction     CAD (coronary artery disease) 12/3/2012    Cerebral artery occlusion with cerebral infarction (HCC)     CHF (congestive heart failure) (HCC)     Chronic kidney disease     40% kidney functiom    COPD (chronic obstructive pulmonary disease) (HCC)     Depression     DM2 (diabetes mellitus, type 2) ([facility])     Dysarthria     Fibromyalgia 6/7/2016    Headache(784.0) 2/19/2013    Hemisensory loss     Hyperlipidemia     Hypertension     IBS (irritable bowel syndrome)     Inferior vena cava occlusion (HCC)     Irritable bowel syndrome     Keratitis     MI, old     Neuropathy     Superior vena cava obstruction     Temporal arteritis ([facility] [address] 2/24/2014       Past Surgical History:   Procedure Laterality Date    ABLATION OF DYSRHYTHMIC FOCUS      ARTERY BIOPSY Right 04/23/2014    RIGHT TEMPORAL ARTERY BIOPSY    CATARACT REMOVAL Bilateral     CHOLECYSTECTOMY      CORONARY ANGIOPLASTY WITH STENT PLACEMENT      CORONARY ANGIOPLASTY WITH STENT PLACEMENT      HYSTERECTOMY      JOINT REPLACEMENT Right     KNEE ARTHROSCOPY Right     KNEE SURGERY      right knee replacement    PTCA  10/2019    LAD and RCA inrtervention    TUNNELED VENOUS PORT PLACEMENT      left thigh. SMART PORT    UPPER GASTROINTESTINAL ENDOSCOPY N/A 7/6/2020    EGD DIAGNOSTIC ONLY performed by Mary Hernandez MD at [place] [id number] History     Socioeconomic History    Marital status:       Spouse name: Not on file    Number of children: 6    Years of education: Not on file    Highest education level: Not on file   Occupational History    Not on file   Social Needs    Financial resource strain: Not on file    Food insecurity     Worry: Not on file     Inability: Not on file    Transportation needs     Medical: Not on file     Non-medical: Not on file   Tobacco Use    Smoking status: Former Smoker     Packs/day: 0.50     Years: 35.00     Pack years: 17.50     Types: Cigarettes     Quit date: ranges from 9-12, averaging around 10. Discharge hemoglobin in the low sevens. DDX Diulefoy lesion, retropulsed lower small intestine bleed. Multiple prior EGDs including April 2019, colonoscopy April 2019 apparently performed for chronic diarrhea, atypical chest pain. Results of biopsies from recent EGD not available to me. Not on iron replacement at this time, likely to permit surveillance of stool. She tells me she is not bleeding from above or below. Plan check hgb. Reiterated avoidance of omeprazole on Protonix and aspirin through January 21. Recommend rechecking hemoglobin a second time around February 1. Directed patient to monitor hemoglobin. Orders placed. #2 Chronic kidney disease stage III. #3 Acute kidney injury, presumably secondary to contrast exposure, possible hypotension, likely ACE inhibitor. Renal panel with hemoglobin check. #4  Treated hypertension  To provide daily Home blood pressure readings and 1 week. #5  Treated hyperlipidemia. Recent reassuring LFTs. Do not see lipid panel in the past year, high HDL previously. Will update with next draw. #6 type 2 diabetes, historically poorly controlled  Patient currently taking glargine to units at bedtime, prescribed Humalog. Is checking her sugars twice daily. She reports no hypoglycemia. Sugars are ranging 100-1 20 in the morning 1 50-1 70 in the evening. Clear reason she is not covering her evening sugar Humalog. We will resume empagliflozin in view of cardiovascular benefit suboptimal diabetes control. Random glucose and A1c subject to bias by anemia in 3 months ordered. #7 hospital-acquired UTI, likely catheter associated. Order UA urine culture document eradication. Noted risk with empagliflozin for UTI, yeast infection. #8  Coronary artery disease  According to the discharge summary cardiology follow-up has been organized. #9  Atypical chest pain  Chronic pain syndrome patient on PPI, high-dose nitro Ranexa. Extensive evaluation, do not get story of post prandial pain to suggest intestinal angina. Weight seemingly stable. #10 constipation. Asked to take MiraLAX daily for 3 days to clean herself out. Disposition: Hemoglobin renal panel lipid panel early next week. She will drop off twice daily blood sugars and daily blood pressure checks in 1 week. Repeat hemoglobin, ferritin February 1. Hemoglobin A1c, hemoglobin, creatinine mid March 2021. It was a pleasure to visit with Ms. Meza today. Answered all questions as best I could. Fortunato Ya MD     Do Carvajal is a 60 y.o. female being evaluated by a Virtual Visit (video visit) encounter to address concerns as mentioned above. A caregiver was present when appropriate. Due to this being a TeleHealth encounter (During [id number] public health emergency), evaluation of the following organ systems was limited: Vitals/Constitutional/EENT/Resp/CV/GI//MS/Neuro/Skin/Heme-Lymph-Imm. Pursuant to the emergency declaration under the [address] [place] [address] authority and the [employer] and [employer] Act, this Virtual Visit was conducted with patient's (and/or legal guardian's) consent, to reduce the patient's risk of exposure to COVID-19 and provide necessary medical care. The patient (and/or legal guardian) has also been advised to contact this office for worsening conditions or problems, and seek emergency medical treatment and/or call 911 if deemed necessary. Patient identification was verified at the start of the visit: Yes    Total time spent for this encounter: 34  min    Services were provided through a video synchronous discussion virtually to substitute for in-person clinic visit. Patient and provider were located at their individual homes. --Fortunato Ya MD on 12/18/2020 at 12:39 PM    An electronic signature was used to authenticate this note.

## 2020-12-22 ENCOUNTER — APPOINTMENT (OUTPATIENT)
Dept: GENERAL RADIOLOGY | Age: 65
End: 2020-12-22
Payer: COMMERCIAL

## 2020-12-22 ENCOUNTER — HOSPITAL ENCOUNTER (OUTPATIENT)
Age: 65
Setting detail: OBSERVATION
Discharge: HOME | End: 2020-12-23
Attending: EMERGENCY MEDICINE | Admitting: INTERNAL MEDICINE
Payer: COMMERCIAL

## 2020-12-22 PROBLEM — R07.9 CHEST PAIN: Status: ACTIVE | Noted: 2020-12-22

## 2020-12-22 LAB
A/G RATIO: 1.3 (ref 1.1–2.2)
ALBUMIN SERPL-MCNC: 4.3 G/DL (ref 3.4–5)
ALP BLD-CCNC: 100 U/L (ref 40–129)
ALT SERPL-CCNC: 44 U/L (ref 10–40)
ANION GAP SERPL CALCULATED.3IONS-SCNC: 16 MMOL/L (ref 3–16)
AST SERPL-CCNC: 72 U/L (ref 15–37)
BASOPHILS ABSOLUTE: 0 K/UL (ref 0–0.2)
BASOPHILS RELATIVE PERCENT: 0.4 %
BILIRUB SERPL-MCNC: 0.3 MG/DL (ref 0–1)
BUN BLDV-MCNC: 17 MG/DL (ref 7–20)
CALCIUM SERPL-MCNC: 9.5 MG/DL (ref 8.3–10.6)
CHLORIDE BLD-SCNC: 105 MMOL/L (ref 99–110)
CO2: 20 MMOL/L (ref 21–32)
CREAT SERPL-MCNC: 1.5 MG/DL (ref 0.6–1.2)
EOSINOPHILS ABSOLUTE: 0.2 K/UL (ref 0–0.6)
EOSINOPHILS RELATIVE PERCENT: 3.3 %
GFR AFRICAN AMERICAN: 42
GFR NON-AFRICAN AMERICAN: 35
GLOBULIN: 3.4 G/DL
GLUCOSE BLD-MCNC: 86 MG/DL (ref 70–99)
HCT VFR BLD CALC: 25.4 % (ref 36–48)
HEMOGLOBIN: 8.5 G/DL (ref 12–16)
LYMPHOCYTES ABSOLUTE: 1.8 K/UL (ref 1–5.1)
LYMPHOCYTES RELATIVE PERCENT: 30.8 %
MCH RBC QN AUTO: 33.3 PG (ref 26–34)
MCHC RBC AUTO-ENTMCNC: 33.5 G/DL (ref 31–36)
MCV RBC AUTO: 99.3 FL (ref 80–100)
MONOCYTES ABSOLUTE: 0.4 K/UL (ref 0–1.3)
MONOCYTES RELATIVE PERCENT: 6.3 %
NEUTROPHILS ABSOLUTE: 3.4 K/UL (ref 1.7–7.7)
NEUTROPHILS RELATIVE PERCENT: 59.2 %
OCCULT BLOOD DIAGNOSTIC: NORMAL
PDW BLD-RTO: 15.6 % (ref 12.4–15.4)
PLATELET # BLD: 247 K/UL (ref 135–450)
PMV BLD AUTO: 7.8 FL (ref 5–10.5)
POTASSIUM SERPL-SCNC: 4 MMOL/L (ref 3.5–5.1)
RBC # BLD: 2.56 M/UL (ref 4–5.2)
REASON FOR REJECTION: NORMAL
REJECTED TEST: NORMAL
SODIUM BLD-SCNC: 141 MMOL/L (ref 136–145)
TOTAL PROTEIN: 7.7 G/DL (ref 6.4–8.2)
TROPONIN: <0.01 NG/ML
WBC # BLD: 5.7 K/UL (ref 4–11)

## 2020-12-22 PROCEDURE — 85025 COMPLETE CBC W/AUTO DIFF WBC: CPT

## 2020-12-22 PROCEDURE — 84484 ASSAY OF TROPONIN QUANT: CPT

## 2020-12-22 PROCEDURE — 6370000000 HC RX 637 (ALT 250 FOR IP): Performed by: EMERGENCY MEDICINE

## 2020-12-22 PROCEDURE — 71045 X-RAY EXAM CHEST 1 VIEW: CPT

## 2020-12-22 PROCEDURE — 93005 ELECTROCARDIOGRAM TRACING: CPT | Performed by: EMERGENCY MEDICINE

## 2020-12-22 PROCEDURE — 96376 TX/PRO/DX INJ SAME DRUG ADON: CPT

## 2020-12-22 PROCEDURE — G0378 HOSPITAL OBSERVATION PER HR: HCPCS

## 2020-12-22 PROCEDURE — 80053 COMPREHEN METABOLIC PANEL: CPT

## 2020-12-22 PROCEDURE — G0328 FECAL BLOOD SCRN IMMUNOASSAY: HCPCS

## 2020-12-22 PROCEDURE — 6360000002 HC RX W HCPCS: Performed by: EMERGENCY MEDICINE

## 2020-12-22 PROCEDURE — 96374 THER/PROPH/DIAG INJ IV PUSH: CPT

## 2020-12-22 PROCEDURE — 96375 TX/PRO/DX INJ NEW DRUG ADDON: CPT

## 2020-12-22 PROCEDURE — 99285 EMERGENCY DEPT VISIT HI MDM: CPT

## 2020-12-22 RX ORDER — ONDANSETRON 2 MG/ML
4 INJECTION INTRAMUSCULAR; INTRAVENOUS ONCE
Status: COMPLETED | OUTPATIENT
Start: 2020-12-22 | End: 2020-12-22

## 2020-12-22 RX ORDER — INSULIN GLARGINE 100 [IU]/ML
40 INJECTION, SOLUTION SUBCUTANEOUS NIGHTLY
Status: DISCONTINUED | OUTPATIENT
Start: 2020-12-23 | End: 2020-12-23 | Stop reason: HOSPADM

## 2020-12-22 RX ORDER — ONDANSETRON 2 MG/ML
4 INJECTION INTRAMUSCULAR; INTRAVENOUS EVERY 6 HOURS PRN
Status: DISCONTINUED | OUTPATIENT
Start: 2020-12-22 | End: 2020-12-23 | Stop reason: HOSPADM

## 2020-12-22 RX ORDER — MORPHINE SULFATE 4 MG/ML
4 INJECTION, SOLUTION INTRAMUSCULAR; INTRAVENOUS ONCE
Status: COMPLETED | OUTPATIENT
Start: 2020-12-22 | End: 2020-12-22

## 2020-12-22 RX ORDER — DULOXETIN HYDROCHLORIDE 60 MG/1
60 CAPSULE, DELAYED RELEASE ORAL DAILY
Status: DISCONTINUED | OUTPATIENT
Start: 2020-12-23 | End: 2020-12-23

## 2020-12-22 RX ORDER — DICYCLOMINE HCL 20 MG
20 TABLET ORAL
Status: ON HOLD | COMMUNITY
Start: 2020-12-09 | End: 2022-01-10 | Stop reason: ALTCHOICE

## 2020-12-22 RX ORDER — RANOLAZINE 500 MG/1
1000 TABLET, EXTENDED RELEASE ORAL 2 TIMES DAILY
Status: DISCONTINUED | OUTPATIENT
Start: 2020-12-23 | End: 2020-12-23 | Stop reason: HOSPADM

## 2020-12-22 RX ORDER — ISOSORBIDE MONONITRATE 30 MG/1
30 TABLET, EXTENDED RELEASE ORAL DAILY
Status: DISCONTINUED | OUTPATIENT
Start: 2020-12-23 | End: 2020-12-23 | Stop reason: HOSPADM

## 2020-12-22 RX ORDER — DIVALPROEX SODIUM 250 MG/1
250 TABLET, EXTENDED RELEASE ORAL 2 TIMES DAILY
Status: DISCONTINUED | OUTPATIENT
Start: 2020-12-23 | End: 2020-12-23

## 2020-12-22 RX ORDER — BUDESONIDE AND FORMOTEROL FUMARATE DIHYDRATE 160; 4.5 UG/1; UG/1
2 AEROSOL RESPIRATORY (INHALATION) DAILY
Status: DISCONTINUED | OUTPATIENT
Start: 2020-12-23 | End: 2020-12-23 | Stop reason: HOSPADM

## 2020-12-22 RX ORDER — ASPIRIN 81 MG/1
81 TABLET ORAL DAILY
Status: DISCONTINUED | OUTPATIENT
Start: 2020-12-23 | End: 2020-12-22 | Stop reason: SDUPTHER

## 2020-12-22 RX ORDER — PROMETHAZINE HYDROCHLORIDE 25 MG/1
12.5 TABLET ORAL EVERY 6 HOURS PRN
Status: DISCONTINUED | OUTPATIENT
Start: 2020-12-22 | End: 2020-12-23 | Stop reason: HOSPADM

## 2020-12-22 RX ORDER — POTASSIUM CHLORIDE 7.45 MG/ML
10 INJECTION INTRAVENOUS PRN
Status: DISCONTINUED | OUTPATIENT
Start: 2020-12-22 | End: 2020-12-23 | Stop reason: HOSPADM

## 2020-12-22 RX ORDER — SODIUM CHLORIDE 0.9 % (FLUSH) 0.9 %
10 SYRINGE (ML) INJECTION PRN
Status: DISCONTINUED | OUTPATIENT
Start: 2020-12-22 | End: 2020-12-23 | Stop reason: HOSPADM

## 2020-12-22 RX ORDER — ATORVASTATIN CALCIUM 80 MG/1
80 TABLET, FILM COATED ORAL NIGHTLY
Status: DISCONTINUED | OUTPATIENT
Start: 2020-12-23 | End: 2020-12-23 | Stop reason: HOSPADM

## 2020-12-22 RX ORDER — CLOPIDOGREL BISULFATE 75 MG/1
75 TABLET ORAL DAILY
Status: DISCONTINUED | OUTPATIENT
Start: 2020-12-23 | End: 2020-12-23 | Stop reason: HOSPADM

## 2020-12-22 RX ORDER — SODIUM CHLORIDE 0.9 % (FLUSH) 0.9 %
10 SYRINGE (ML) INJECTION EVERY 12 HOURS SCHEDULED
Status: DISCONTINUED | OUTPATIENT
Start: 2020-12-23 | End: 2020-12-23 | Stop reason: HOSPADM

## 2020-12-22 RX ORDER — MAGNESIUM SULFATE IN WATER 40 MG/ML
2 INJECTION, SOLUTION INTRAVENOUS PRN
Status: DISCONTINUED | OUTPATIENT
Start: 2020-12-22 | End: 2020-12-23 | Stop reason: HOSPADM

## 2020-12-22 RX ORDER — METOPROLOL SUCCINATE 25 MG/1
25 TABLET, EXTENDED RELEASE ORAL 2 TIMES DAILY WITH MEALS
Status: DISCONTINUED | OUTPATIENT
Start: 2020-12-23 | End: 2020-12-23 | Stop reason: HOSPADM

## 2020-12-22 RX ORDER — AMLODIPINE BESYLATE 5 MG/1
5 TABLET ORAL 2 TIMES DAILY
Status: DISCONTINUED | OUTPATIENT
Start: 2020-12-23 | End: 2020-12-23 | Stop reason: HOSPADM

## 2020-12-22 RX ORDER — ASPIRIN 81 MG/1
81 TABLET, CHEWABLE ORAL DAILY
Status: DISCONTINUED | OUTPATIENT
Start: 2020-12-23 | End: 2020-12-23 | Stop reason: HOSPADM

## 2020-12-22 RX ORDER — POLYETHYLENE GLYCOL 3350 17 G/17G
17 POWDER, FOR SOLUTION ORAL DAILY PRN
Status: DISCONTINUED | OUTPATIENT
Start: 2020-12-22 | End: 2020-12-23 | Stop reason: HOSPADM

## 2020-12-22 RX ADMIN — NITROGLYCERIN 1 INCH: 20 OINTMENT TOPICAL at 19:24

## 2020-12-22 RX ADMIN — MORPHINE SULFATE 4 MG: 4 INJECTION, SOLUTION INTRAMUSCULAR; INTRAVENOUS at 22:03

## 2020-12-22 RX ADMIN — MORPHINE SULFATE 4 MG: 4 INJECTION, SOLUTION INTRAMUSCULAR; INTRAVENOUS at 19:24

## 2020-12-22 RX ADMIN — ONDANSETRON 4 MG: 2 INJECTION INTRAMUSCULAR; INTRAVENOUS at 19:24

## 2020-12-22 ASSESSMENT — PAIN DESCRIPTION - LOCATION
LOCATION: CHEST

## 2020-12-22 ASSESSMENT — PAIN SCALES - GENERAL
PAINLEVEL_OUTOF10: 8
PAINLEVEL_OUTOF10: 10
PAINLEVEL_OUTOF10: 8
PAINLEVEL_OUTOF10: 10
PAINLEVEL_OUTOF10: 10

## 2020-12-22 ASSESSMENT — PAIN DESCRIPTION - DESCRIPTORS
DESCRIPTORS: ACHING;PRESSURE
DESCRIPTORS: HEAVINESS

## 2020-12-22 ASSESSMENT — PAIN DESCRIPTION - ORIENTATION: ORIENTATION: LEFT;MID

## 2020-12-22 ASSESSMENT — PAIN DESCRIPTION - FREQUENCY
FREQUENCY: INTERMITTENT
FREQUENCY: INTERMITTENT

## 2020-12-22 ASSESSMENT — PAIN DESCRIPTION - PAIN TYPE
TYPE: ACUTE PAIN
TYPE: ACUTE PAIN

## 2020-12-22 NOTE — ED NOTES
MD aware of pt difficult stick. Another RN to attempt US guided IV placement.   Pt updated and aware of any delays     Shira Abreu RN  12/22/20 [id number]

## 2020-12-22 NOTE — ED PROVIDER NOTES
[facility]  eMERGENCY dEPARTMENT eNCOUnter      Pt Name: Ariadna Calderon  MRN: [id number]  [place] 1955  Date of evaluation: 12/22/2020  Provider: Audrey Guadarrama MD    [address] [place] [address]       Chief Complaint   Patient presents with    Chest Pain     started 1.5 hours PTA. Took 3 Nitro with minimal help         CRITICAL CARE TIME   Total Critical Care time was 0 minutes, excluding separately reportable procedures. There was a high probability of clinically significant/life threatening deterioration in the patient's condition which required my urgent intervention. HISTORY OF PRESENT ILLNESS  (Location/Symptom, Timing/Onset, Context/Setting, Quality, Duration, Modifying Factors, Severity.)   Ariadna Calderon is a 60 y.o. female who presents to the emergency department complaining of substernal left-sided chest pain radiating to her neck and jaw and left shoulder. It started about an hour and a half prior to arrival.  She took 3 sublingual nitroglycerin and she states it helped some, but she states the pain to level 10 at this time. She feels little short of breath. She is nauseated. No vomiting. No diaphoresis. She has a known history of coronary artery disease. Is being managed medically, she is not a candidate for surgical intervention based on review of her old records. Nursing Notes were reviewed and I agree. REVIEW OF SYSTEMS    (2-9 systems for level 4, 10 or more for level 5)     General: No fever. ENT: Negative. Cardiovascular: Chest pain as above. Pulmonary: Shortness of breath as above. No cough. GI: No abdominal pain. Nausea, no vomiting. Musculoskeletal: No leg pain or leg swelling. Except as noted above the remainder of the review of systems was reviewed and negative.        PAST MEDICAL HISTORY     Past Medical History:   Diagnosis Date    Acid reflux     Anemia     Anxiety     Arthritis     Asthma     [facility]     Blood transfusion reaction     CAD (coronary artery disease) 12/3/2012    Cerebral artery occlusion with cerebral infarction ([facility]     [facility]     Chronic kidney disease     40% kidney functiom    COPD (chronic obstructive pulmonary disease) ([facility])     Depression     DM2 (diabetes mellitus, type 2) ([facility]     Dysarthria     Fibromyalgia 6/7/2016    Headache(784.0) 2/19/2013    Hemisensory loss     Hyperlipidemia     Hypertension     IBS (irritable bowel syndrome)     Inferior vena cava occlusion (HCC)     Irritable bowel syndrome     Keratitis     MI, old     Neuropathy     Superior vena cava obstruction     Temporal arteritis ([facility] 2/24/2014         SURGICAL HISTORY       Past Surgical History:   Procedure Laterality Date    ABLATION OF DYSRHYTHMIC FOCUS      ARTERY BIOPSY Right 04/23/2014    RIGHT TEMPORAL ARTERY BIOPSY    CATARACT REMOVAL Bilateral     CHOLECYSTECTOMY      CORONARY ANGIOPLASTY WITH STENT PLACEMENT      CORONARY ANGIOPLASTY WITH STENT PLACEMENT      HYSTERECTOMY      JOINT REPLACEMENT Right     KNEE ARTHROSCOPY Right     KNEE SURGERY      right knee replacement    PTCA  10/2019    LAD and RCA inrtervention    TUNNELED VENOUS PORT PLACEMENT      left thigh.   SMART PORT    UPPER GASTROINTESTINAL ENDOSCOPY N/A 7/6/2020    EGD DIAGNOSTIC ONLY performed by Karen Berkowitz MD at [facility] 1       Previous Medications    ALBUTEROL (PROVENTIL) (2.5 MG/3ML) 0.083% NEBULIZER SOLUTION    Take 3 mLs by nebulization every 4 hours as needed for Wheezing    ALBUTEROL SULFATE HFA (VENTOLIN HFA) 108 (90 BASE) MCG/ACT INHALER    Inhale 2 puffs into the lungs every 4 hours as needed for Wheezing or Shortness of Breath    AMLODIPINE (NORVASC) 5 MG TABLET    Take 1 tablet by mouth 2 times daily    ASPIRIN LOW DOSE 81 MG EC TABLET    TAKE 1 TABLET BY MOUTH DAILY    ATORVASTATIN (LIPITOR) 80 MG TABLET    Take 1 tablet by mouth nightly    BLOOD GLUCOSE MONITORING SUPPL (TRUE METRIX METER) W/DEVICE KIT    1 kit by Does not apply route 2 times daily    BLOOD GLUCOSE TEST STRIPS (TRUE METRIX BLOOD GLUCOSE TEST) STRIP    1 each by Does not apply route 2 times daily    BUDESONIDE-FORMOTEROL (SYMBICORT) 160-4.5 MCG/ACT AERO    Inhale 2 puffs into the lungs daily    CLOPIDOGREL (PLAVIX) 75 MG TABLET    TAKE 1 TABLET BY MOUTH ABIMBOLA VICTORIA    DICYCLOMINE (BENTYL) 20 MG TABLET    Take 20 mg by mouth 4 times daily    DIVALPROEX (DEPAKOTE ER) 250 MG EXTENDED RELEASE TABLET    Take 1 tablet po in the morning, take 2 tablets po in the evening    DULOXETINE (CYMBALTA) 60 MG EXTENDED RELEASE CAPSULE    Take 1 capsule by mouth daily    ERENUMAB-AOOE (AIMOVIG, 140 MG DOSE,) 70 MG/ML SOAJ    Inject 140 mLs into the skin every 30 days    HYDRALAZINE (APRESOLINE) 50 MG TABLET    Take 1 tablet by mouth 2 times daily    INSULIN ASPART (NOVOLOG FLEXPEN) 100 UNIT/ML INJECTION PEN    Inject 5-12 Units into the skin 3 times daily (before meals) Sugar < 150   Zero Unit  151-200   5 U   201- 250   8 U   251-300    12 U    INSULIN GLARGINE (BASAGLAR KWIKPEN) 100 UNIT/ML INJECTION PEN    Inject 40 Units into the skin nightly    ISOSORBIDE MONONITRATE (IMDUR) 30 MG EXTENDED RELEASE TABLET    Take 1 tablet by mouth daily    LANCET DEVICES (SIMPLE DIAGNOSTICS LANCING DEV) MISC    USE WITH LANCETS TO TEST BLOOD GLUCOSE    LIDOCAINE (XYLOCAINE) 5 % OINTMENT    Apply topically as needed. METOPROLOL SUCCINATE (TOPROL XL) 50 MG EXTENDED RELEASE TABLET    Take 0.5 tablets by mouth 2 times daily (with meals)    NEBULIZERS (COMPRESSOR/NEBULIZER) MISC    1 Device by Does not apply route 4 times daily as needed (SOB / Wheezing)    NITROGLYCERIN (NITROSTAT) 0.4 MG SL TABLET    Place 1 tablet under the tongue every 5 minutes as needed for Chest pain up to max of 3 total doses. If no relief after 1 dose, call 911.     NUTRITIONAL SUPPLEMENTS (ENSURE) LIQD    Take 1 Can by mouth 3 times daily as needed (nutrition)    OMEPRAZOLE (PRILOSEC) 20 MG DELAYED RELEASE CAPSULE    TAKE 1 CAPSULE BY MOUTH DAILY    ONDANSETRON (ZOFRAN) 4 MG TABLET    Take 1 tablet by mouth 3 times daily as needed for Nausea or Vomiting    ONDANSETRON (ZOFRAN) 4 MG TABLET    Take 1 tablet by mouth 3 times daily as needed for Nausea or Vomiting    PHARMACIST CHOICE LANCETS MISC    USE TO TEST BLOOD GLUCOSE THREE TIMES DAILY    QUETIAPINE (SEROQUEL) 25 MG TABLET    Take 1-2 tablets by mouth nightly    RANOLAZINE (RANEXA) 1000 MG EXTENDED RELEASE TABLET    Take 1 tablet by mouth 2 times daily    RIZATRIPTAN (MAXALT) 10 MG TABLET    Take 1 tablet by mouth once as needed for Migraine May repeat in 2 hours if needed    TIZANIDINE (ZANAFLEX) 4 MG TABLET    Take 1/2-1 tablet po BID    TORSEMIDE (DEMADEX) 10 MG TABLET    Take 1 tablet by mouth daily    ULTICARE ALCOHOL SWABS 70 % PADS    USE TO TEST BLOOD GLUCOSE THREE TIMES DAILY    UNIFINE PENTIPS 31G X 8 MM MISC    USE WITH insulin pens       ALLERGIES     Patient has no known allergies. FAMILY HISTORY       Family History   Problem Relation Age of Onset    Diabetes Mother     Hypertension Mother     High Cholesterol Mother     Stroke Mother     Cancer Mother     No Known Problems Paternal Grandfather         lung issues           SOCIAL HISTORY       Social History     Socioeconomic History    Marital status:       Spouse name: None    Number of children: 6    Years of education: None    Highest education level: None   Occupational History    None   Social Needs    Financial resource strain: None    Food insecurity     Worry: None     Inability: None    Transportation needs     Medical: None     Non-medical: None   Tobacco Use    Smoking status: Former Smoker     Packs/day: 0.50     Years: 35.00     Pack years: 17.50     Types: Cigarettes     Quit date: 2018     Years since quittin.4    Smokeless tobacco: angina, myocardial infarction, pulmonary embolus, aortic dissection. DIAGNOSTIC RESULTS     EKG: All EKG's are interpreted by Audrey Guadarrama MD in the absence of a cardiologist.    Sinus bradycardia, rate of 59, no acute ST-T wave changes. Rhythm strip shows sinus bradycardia with a rate of 59, SD interval 106 ms, QRS of 92 ms with no other ectopy as interpreted by me. This was compared to an EKG dated 7/4/2020, the sinus bradycardia is new, no other significant change noted.     RADIOLOGY:   Non-plain film images such as CT, Ultrasound and MRI are read by the radiologist. Plain radiographic images are visualized and preliminarily interpreted byANTONIO Kessler MD with the below findings:        Interpretation per the Radiologist below, if available at the time of this note:    XR CHEST PORTABLE   Final Result   No significant findings in the chest.               ED BEDSIDE ULTRASOUND:   Performed by ED Physician - none    LABS:  Labs Reviewed   COMPREHENSIVE METABOLIC PANEL - Abnormal; Notable for the following components:       Result Value    CO2 20 (*)     CREATININE 1.5 (*)     GFR Non- 35 (*)     GFR  42 (*)     ALT 44 (*)     AST 72 (*)     All other components within normal limits    Narrative:     Performed at:  [facility]  [address] [facility] [address]   Phone [phone]   CBC WITH AUTO DIFFERENTIAL - Abnormal; Notable for the following components:    RBC 2.56 (*)     Hemoglobin 8.5 (*)     Hematocrit 25.4 (*)     RDW 15.6 (*)     All other components within normal limits    Narrative:     Performed at:  [facility]  [address] [facility] [address]   Phone [phone]   TROPONIN    Narrative:     Performed at:  [facility]  [address] [facility] [address]   Phone [phone]   SPECIMEN REJECTION    Narrative: CALL  Golden  Judy Hensley [phone],  Rejected Test Name/Called to: Walker DE LA VEGA, 12/22/2020 19:17, by [facility]  Performed at:  [facility]  [address]  [facility] [address]   Phone [phone]   BLOOD OCCULT STOOL DIAGNOSTIC    Narrative:     ORDER#: [id number]                          ORDERED BY: Rafia Garzon  SOURCE: Stool                              COLLECTED:  12/22/20 20:52  ANTIBIOTICS AT REBECCA.:                      RECEIVED :  12/22/20 21:07  Performed at:  [facility]  [address] [facility] [address]   Phone [phone]       All other labs were within normal range or not returned as of this dictation. EMERGENCY DEPARTMENT COURSE and DIFFERENTIAL DIAGNOSIS/MDM:   Vitals:    Vitals:    12/22/20 1734 12/22/20 1930 12/22/20 2032   BP: (!) 215/81 (!) 175/66 (!) 161/54   Pulse: 61 57 57   Resp: 20 16 13   SpO2: 100% 93% 99%   Weight: 129 lb 3 oz (58.6 kg)     Height: 5' (1.524 m)         This patient has a long history of coronary artery disease. Review of her old records show that she was actually hospitalized at [facility] about 2 weeks ago. She had an NSTEMI. She has severe multivessel disease. There was a plan to take her to the Cath Lab, but it was canceled because she was significantly anemic, according to the records it appears that they felt it was due to an upper GI bleed, but they could not find a source on EGD. She is transfused with 2 units of blood. She denies any blood in her stool or black stool. She is actively having chest pain when she arrives. She has no acute EKG changes. Her hemoglobin is 8.5 her stool is Hemoccult negative. Her pain got somewhat better with nitroglycerin at home. She was placed on topical nitrates and given IV morphine for pain here. She was significantly hypertensive on arrival.  Her blood pressure is improved from 215/81 to 160/54.   She will require admission for monitoring, serial cardiac enzymes, repeat H&H. Test results, diagnosis, and treatment plan were discussed with the patient. She understands the treatment plan and follow-up as discussed. CONSULTS:  IP CONSULT TO HOSPITALIST    PROCEDURES:  None    FINAL IMPRESSION      1. Chest pain, unspecified type    2. Anemia, unspecified type          DISPOSITION/PLAN   DISPOSITION Decision To Admit 12/22/2020 10:00:28 PM      PATIENT REFERRED TO:  No follow-up provider specified.     DISCHARGE MEDICATIONS:  New Prescriptions    No medications on file       (Please note that portions of this note were completed with a voice recognition program.  Efforts were made to edit the dictations but occasionally words are mis-transcribed.)    Sajan Rao MD  Attending Emergency Physician        Raghu Vargas MD  12/22/20 8601

## 2020-12-22 NOTE — ED NOTES
Bed: B-06  Expected date:   Expected time:   Means of arrival: [facility]  Comments:  GATO Velasco RN  12/22/20 4137

## 2020-12-22 NOTE — ED NOTES
Pt placed on/continued on cardiac monitor and continuous pulse ox - see flowsheet     Emma Spring RN  12/22/20 5394

## 2020-12-22 NOTE — ED NOTES
Acknowledged pt by pt's name. Verified pt by name and date of birth. Checked arm band, allergies, reviewed past medical history. Introduced myself to patient  Duration of ED plan of care explained to patient  Explained planned tests and procedures  Thanked patient for coming to [facility].    Asked if there was anything else I could do for the patient before exiting room. CB in reach.      Lyubov Ruffin RN  12/22/20 2308

## 2020-12-23 VITALS
DIASTOLIC BLOOD PRESSURE: 73 MMHG | RESPIRATION RATE: 16 BRPM | BODY MASS INDEX: 24.19 KG/M2 | OXYGEN SATURATION: 98 % | TEMPERATURE: 98.2 F | HEIGHT: 60 IN | HEART RATE: 66 BPM | SYSTOLIC BLOOD PRESSURE: 138 MMHG | WEIGHT: 123.24 LBS

## 2020-12-23 LAB
EKG ATRIAL RATE: 59 BPM
EKG DIAGNOSIS: NORMAL
EKG P AXIS: 5 DEGREES
EKG P-R INTERVAL: 106 MS
EKG Q-T INTERVAL: 440 MS
EKG QRS DURATION: 92 MS
EKG QTC CALCULATION (BAZETT): 435 MS
EKG R AXIS: 34 DEGREES
EKG T AXIS: 58 DEGREES
EKG VENTRICULAR RATE: 59 BPM
GLUCOSE BLD-MCNC: 65 MG/DL (ref 70–99)
GLUCOSE BLD-MCNC: 67 MG/DL (ref 70–99)
GLUCOSE BLD-MCNC: 88 MG/DL (ref 70–99)
GLUCOSE BLD-MCNC: 93 MG/DL (ref 70–99)
PERFORMED ON: ABNORMAL
PERFORMED ON: ABNORMAL
PERFORMED ON: NORMAL
PERFORMED ON: NORMAL
TROPONIN: <0.01 NG/ML
TROPONIN: <0.01 NG/ML

## 2020-12-23 PROCEDURE — G0378 HOSPITAL OBSERVATION PER HR: HCPCS

## 2020-12-23 PROCEDURE — 36415 COLL VENOUS BLD VENIPUNCTURE: CPT

## 2020-12-23 PROCEDURE — 94640 AIRWAY INHALATION TREATMENT: CPT

## 2020-12-23 PROCEDURE — 6370000000 HC RX 637 (ALT 250 FOR IP): Performed by: INTERNAL MEDICINE

## 2020-12-23 PROCEDURE — 94760 N-INVAS EAR/PLS OXIMETRY 1: CPT

## 2020-12-23 PROCEDURE — 84484 ASSAY OF TROPONIN QUANT: CPT

## 2020-12-23 PROCEDURE — 2580000003 HC RX 258: Performed by: INTERNAL MEDICINE

## 2020-12-23 PROCEDURE — 6360000002 HC RX W HCPCS: Performed by: INTERNAL MEDICINE

## 2020-12-23 PROCEDURE — 96376 TX/PRO/DX INJ SAME DRUG ADON: CPT

## 2020-12-23 PROCEDURE — 93010 ELECTROCARDIOGRAM REPORT: CPT | Performed by: INTERNAL MEDICINE

## 2020-12-23 PROCEDURE — 6370000000 HC RX 637 (ALT 250 FOR IP): Performed by: NURSE PRACTITIONER

## 2020-12-23 RX ORDER — DEXTROSE MONOHYDRATE 50 MG/ML
100 INJECTION, SOLUTION INTRAVENOUS PRN
Status: DISCONTINUED | OUTPATIENT
Start: 2020-12-23 | End: 2020-12-23 | Stop reason: HOSPADM

## 2020-12-23 RX ORDER — QUETIAPINE FUMARATE 25 MG/1
25 TABLET, FILM COATED ORAL NIGHTLY
Status: DISCONTINUED | OUTPATIENT
Start: 2020-12-23 | End: 2020-12-23

## 2020-12-23 RX ORDER — QUETIAPINE FUMARATE 25 MG/1
25 TABLET, FILM COATED ORAL NIGHTLY
Status: CANCELLED | OUTPATIENT
Start: 2020-12-23

## 2020-12-23 RX ORDER — DEXTROSE MONOHYDRATE 25 G/50ML
12.5 INJECTION, SOLUTION INTRAVENOUS PRN
Status: DISCONTINUED | OUTPATIENT
Start: 2020-12-23 | End: 2020-12-23 | Stop reason: HOSPADM

## 2020-12-23 RX ORDER — ASPIRIN 81 MG/1
81 TABLET, CHEWABLE ORAL DAILY
Qty: 30 TABLET | Refills: 3 | Status: SHIPPED | OUTPATIENT
Start: 2020-12-24 | End: 2022-05-16 | Stop reason: SDUPTHER

## 2020-12-23 RX ORDER — TRAMADOL HYDROCHLORIDE 50 MG/1
50 TABLET ORAL ONCE
Status: COMPLETED | OUTPATIENT
Start: 2020-12-23 | End: 2020-12-23

## 2020-12-23 RX ORDER — NICOTINE POLACRILEX 4 MG
15 LOZENGE BUCCAL PRN
Status: DISCONTINUED | OUTPATIENT
Start: 2020-12-23 | End: 2020-12-23 | Stop reason: HOSPADM

## 2020-12-23 RX ORDER — ACETAMINOPHEN 325 MG/1
650 TABLET ORAL EVERY 4 HOURS PRN
Status: DISCONTINUED | OUTPATIENT
Start: 2020-12-23 | End: 2020-12-23 | Stop reason: HOSPADM

## 2020-12-23 RX ORDER — QUETIAPINE FUMARATE 25 MG/1
25 TABLET, FILM COATED ORAL NIGHTLY
Status: DISCONTINUED | OUTPATIENT
Start: 2020-12-23 | End: 2020-12-23 | Stop reason: HOSPADM

## 2020-12-23 RX ADMIN — ONDANSETRON 4 MG: 2 INJECTION INTRAMUSCULAR; INTRAVENOUS at 01:29

## 2020-12-23 RX ADMIN — Medication 10 ML: at 09:25

## 2020-12-23 RX ADMIN — AMLODIPINE BESYLATE 5 MG: 5 TABLET ORAL at 09:20

## 2020-12-23 RX ADMIN — AMLODIPINE BESYLATE 5 MG: 5 TABLET ORAL at 01:00

## 2020-12-23 RX ADMIN — ISOSORBIDE MONONITRATE 30 MG: 30 TABLET, EXTENDED RELEASE ORAL at 09:20

## 2020-12-23 RX ADMIN — INSULIN GLARGINE 20 UNITS: 100 INJECTION, SOLUTION SUBCUTANEOUS at 02:17

## 2020-12-23 RX ADMIN — BUDESONIDE AND FORMOTEROL FUMARATE DIHYDRATE 2 PUFF: 160; 4.5 AEROSOL RESPIRATORY (INHALATION) at 08:47

## 2020-12-23 RX ADMIN — TRAMADOL HYDROCHLORIDE 50 MG: 50 TABLET ORAL at 01:00

## 2020-12-23 RX ADMIN — CLOPIDOGREL BISULFATE 75 MG: 75 TABLET ORAL at 09:20

## 2020-12-23 RX ADMIN — RANOLAZINE 1000 MG: 500 TABLET, FILM COATED, EXTENDED RELEASE ORAL at 01:00

## 2020-12-23 RX ADMIN — RANOLAZINE 1000 MG: 500 TABLET, FILM COATED, EXTENDED RELEASE ORAL at 09:20

## 2020-12-23 RX ADMIN — ATORVASTATIN CALCIUM 80 MG: 80 TABLET, FILM COATED ORAL at 01:00

## 2020-12-23 RX ADMIN — ASPIRIN 81 MG: 81 TABLET, CHEWABLE ORAL at 09:20

## 2020-12-23 RX ADMIN — QUETIAPINE FUMARATE 25 MG: 25 TABLET ORAL at 02:16

## 2020-12-23 RX ADMIN — ACETAMINOPHEN 650 MG: 325 TABLET ORAL at 09:20

## 2020-12-23 RX ADMIN — METOPROLOL SUCCINATE 25 MG: 25 TABLET, EXTENDED RELEASE ORAL at 09:24

## 2020-12-23 ASSESSMENT — PAIN DESCRIPTION - ONSET: ONSET: ON-GOING

## 2020-12-23 ASSESSMENT — PAIN SCALES - GENERAL
PAINLEVEL_OUTOF10: 8
PAINLEVEL_OUTOF10: 5
PAINLEVEL_OUTOF10: 8
PAINLEVEL_OUTOF10: 2

## 2020-12-23 ASSESSMENT — PAIN DESCRIPTION - PROGRESSION: CLINICAL_PROGRESSION: NOT CHANGED

## 2020-12-23 ASSESSMENT — PAIN - FUNCTIONAL ASSESSMENT: PAIN_FUNCTIONAL_ASSESSMENT: ACTIVITIES ARE NOT PREVENTED

## 2020-12-23 ASSESSMENT — PAIN DESCRIPTION - FREQUENCY: FREQUENCY: INTERMITTENT

## 2020-12-23 ASSESSMENT — PAIN DESCRIPTION - LOCATION: LOCATION: CHEST

## 2020-12-23 ASSESSMENT — PAIN DESCRIPTION - PAIN TYPE: TYPE: ACUTE PAIN

## 2020-12-23 ASSESSMENT — PAIN DESCRIPTION - DESCRIPTORS: DESCRIPTORS: HEAVINESS

## 2020-12-23 NOTE — PLAN OF CARE
Problem: Pain:  Goal: Pain level will decrease  Description: Pain level will decrease  Outcome: Ongoing  Goal: Control of acute pain  Description: Control of acute pain  Outcome: Ongoing  Goal: Control of chronic pain  Description: Control of chronic pain  Outcome: Ongoing     Problem: Falls - Risk of:  Goal: Will remain free from falls  Description: Will remain free from falls  Outcome: Ongoing  Goal: Absence of physical injury  Description: Absence of physical injury  Outcome: Ongoing     Problem: SAFETY  Goal: Free from accidental physical injury  Outcome: Ongoing     Problem: DAILY CARE  Goal: Daily care needs are met  Outcome: Ongoing     Problem: PAIN  Goal: Patient's pain/discomfort is manageable  Outcome: Ongoing     Problem: SKIN INTEGRITY  Goal: Skin integrity is maintained or improved  Outcome: Ongoing     Problem: KNOWLEDGE DEFICIT  Goal: Patient/S.O. demonstrates understanding of disease process, treatment plan, medications, and discharge instructions.   Outcome: Ongoing     Problem: DISCHARGE BARRIERS  Goal: Patient's continuum of care needs are met  Outcome: Ongoing

## 2020-12-23 NOTE — DISCHARGE INSTR - DIET
Good nutrition is important when healing from an illness, injury, or surgery. Follow any nutrition recommendations given to you during your hospital stay. If you were given an oral nutrition supplement while in the hospital, continue to take this supplement at home. You can take it with meals, in-between meals, and/or before bedtime. These supplements can be purchased at most local grocery stores, pharmacies, and chain [facility]-stores. If you have any questions about your diet or nutrition, call the hospital and ask for the dietitian.

## 2020-12-23 NOTE — DISCHARGE INSTR - COC
Continuity of Care Form    Patient Name: Ariadna Calderon   :  1955  MRN:  [id number]    Admit date:  2020  Discharge date:  ***    Code Status Order: Full Code   Advance Directives:   Advance Care Flowsheet Documentation       Date/Time Healthcare Directive Type of Healthcare Directive Copy in [address] Agent's Name Healthcare Agent's Phone Number    [phone]  No, patient does not have an advance directive for healthcare treatment -- -- -- -- --            Admitting Physician:  Charity Preciado MD  PCP: Octavio Gilbert MD    Discharging Nurse: [facility] Unit/Room#: E5S-1058/4053-91  Discharging Unit Phone Number: ***    Emergency Contact:   Extended Emergency Contact Information  Primary Emergency Contact: Britany Santamaria [address] Phone: [phone]  Mobile Phone: [phone]  Relation: Child  Secondary Emergency Contact: Jl Davidson  Address: [address] Phone: [phone]  Mobile Phone: [phone]  Relation: Child    Past Surgical History:  Past Surgical History:   Procedure Laterality Date    ABLATION OF DYSRHYTHMIC FOCUS      ARTERY BIOPSY Right 2014    RIGHT TEMPORAL ARTERY BIOPSY    CATARACT REMOVAL Bilateral     CHOLECYSTECTOMY      CORONARY ANGIOPLASTY WITH STENT PLACEMENT      CORONARY ANGIOPLASTY WITH STENT PLACEMENT      HYSTERECTOMY      JOINT REPLACEMENT Right     KNEE ARTHROSCOPY Right     KNEE SURGERY      right knee replacement    PTCA  10/2019    LAD and RCA inrtervention    TUNNELED VENOUS PORT PLACEMENT      left thigh.   SMART PORT    UPPER GASTROINTESTINAL ENDOSCOPY N/A 2020    EGD DIAGNOSTIC ONLY performed by Kirsten Edge MD at [address] [place] [address]         Immunization History:   Immunization History   Administered Date(s) Administered    Influenza Vaccine, unspecified formulation 2009, 10/08/2010    Influenza Virus Vaccine 10/08/2010, 12/11/2013, 09/30/2014    Influenza, Intradermal, Preservative free 10/04/2012    Influenza, Intradermal, Quadrivalent, Preservative Free 11/07/2017    Influenza, Quadv, IM, PF (6 mo and older Fluzone, Flulaval, Fluarix, and 3 yrs and older Afluria) 09/28/2016, 09/14/2018    Influenza, Quadv, adjuvanted, 65 yrs +, IM, PF (Fluad) 10/01/2020    Influenza, Triv, inactivated, subunit, adjuvanted, IM (Fluad 65 yrs and older) 09/24/2019    Pneumococcal Polysaccharide ([id number]) 12/12/2013    Tdap (Boostrix, Adacel) 11/17/2009, 10/28/2016       Active Problems:  Patient Active Problem List   Diagnosis Code    HTN (hypertension) I10    Hyperlipidemia E78.5    CAD (coronary artery disease) I25.10    Palpitation R00.2    PVC (premature ventricular contraction) I49.3    Depression F32.9    Migraine with aura, intractable G43.119    Hemisensory loss R20.0    PTSD (post-traumatic stress disorder) F43.10    Insomnia G47.00    Snoring R06.83    Atypical chest pain R07.89    Dysarthria R47.1    Port-A-Cath in place Z95.828    Inferior vena cava occlusion (HCC) I82.220    Cataract H26.9    Benign essential tremor G25.0    Tobacco use Z72.0    Chronic diastolic CHF (congestive heart failure), NYHA class 2 ([facility]) I50.32    COPD (chronic obstructive pulmonary disease) ([facility] J44.9    Pulmonary edema J81.1    NSTEMI (non-ST elevated myocardial infarction) ([facility] I21.4    Rotator cuff tendonitis M75.80    Essential hypertension I10    Fibromyalgia M79.7    Chronic pain syndrome G89.4    Headache, chronic migraine without aura, intractable, with status G43.711    Type 2 diabetes mellitus with diabetic chronic kidney disease ([facility]) E11.22    S/P right coronary artery (RCA) stent placement Z95.5    Irritable bowel syndrome K58.9    Giant cell arteritis ([facility]) M31.6    Gastro-esophageal reflux disease without esophagitis K21.9    A-fib ([facility]) I48.91    Abscess of groin, right L02.214    Precordial chest pain R07.2    Coronary artery disease involving native coronary artery of native heart with angina pectoris ([facility]) I25.119    COPD exacerbation ([facility]) J44.1    DM (diabetes mellitus), type 2, uncontrolled ([facility] E11.65    Right knee pain M25.561    Chronic painful diabetic neuropathy ([facility]) E11.40    CAD in native artery I25.10    Dyspnea on exertion R06.00    Unstable angina ([facility]) I20.0    Sepsis ([facility]) A41.9    Acute respiratory failure with hypoxia ([facility]) J96.01    Generalized weakness R53.1    Reactive airway disease with wheezing with acute exacerbation J45.901    Headache R51.9    DMII (diabetes mellitus, type 2) ([facility]) F68.6    Metabolic acidosis E93.3    Acute-on-chronic renal failure ([facility]) N17.9, N18.9    Age-related nuclear cataract, bilateral H25.13    Chronic prescription opiate use Z79.891    Coronary stent restenosis due to progression of disease T82.855A, I25.10    Current moderate episode of major depressive disorder ([facility]) F32.1    Diabetic retinopathy of both eyes without macular edema associated with type 2 diabetes mellitus ([facility] E11.319    Hypertensive heart and chronic kidney disease with heart failure and stage 1 through stage 4 chronic kidney disease, or unspecified chronic kidney disease ([facility]) I13.0    Hypertensive retinopathy, bilateral H35.033    Ischemic cardiomyopathy I25.5    Moderate episode of recurrent major depressive disorder ([facility]) F33.1    Nausea R11.0    Other age-related incipient cataract, bilateral H25.093    Presence of intraocular lens Z96.1    Punctate keratitis, bilateral H16.143    Regular astigmatism, bilateral H52.223    Palliative care encounter Z51.5    CKD (chronic kidney disease) stage 3, GFR 30-59 ml/min N18.30    Compression of brain ([facility]) G93.5    Thrombosis of superior vena cava, 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    Type 2 diabetes mellitus with mild nonproliferative diabetic retinopathy without macular edema, bilateral ([facility] W94.5443    Uncomplicated opioid dependence ([facility]) F11.20    Acute chest pain R07.9 KVBL:650325211:::5}    Routes of Feeding: {CHP DME Other Feedings:412457298:::0}  Liquids: {Slp liquid thickness:85251}  Daily Fluid Restriction: {CHP DME Yes amt example:937492538:::0}  Last Modified Barium Swallow with Video (Video Swallowing Test): {Done Not Done CARLY:438804736:::9}    Treatments at the Time of Hospital Discharge:   Respiratory Treatments: ***  Oxygen Therapy:  {Therapy; copd oxygen:63692:::0}  Ventilator:    {[facility] Vent List:548971462:::0}    Rehab Therapies: {THERAPEUTIC INTERVENTION:0143573466}  Weight Bearing Status/Restrictions: { CC Weight Bearin:::0}  Other Medical Equipment (for information only, NOT a DME order):  {EQUIPMENT:788530792}  Other Treatments: ***    Patient's personal belongings (please select all that are sent with patient):  {CHP DME Belongings:574143122:::0}    RN SIGNATURE:  {Esignature:751441910:::0}    CASE MANAGEMENT/SOCIAL WORK SECTION    Inpatient Status Date: ***    Readmission Risk Assessment Score:  Readmission Risk              Risk of Unplanned Readmission:        0           Discharging to Facility/ Agency   Name:   Address:  Phone:  Fax:    Dialysis Facility (if applicable)   Name:  Address:  Dialysis Schedule:  Phone:  Fax:    / signature: {Esignature:285312980:::0}    PHYSICIAN SECTION    Prognosis: {Prognosis:7796790853:::0}    Condition at Discharge: [address] [place] [address] Patient Condition:063005812:::0}    Rehab Potential (if transferring to Rehab): {Prognosis:6683797083:::0}    Recommended Labs or Other Treatments After Discharge: ***    Physician Certification: I certify the above information and transfer of Callie Miguel  is necessary for the continuing treatment of the diagnosis listed and that she requires {Admit to Appropriate Level of Care:65957:::0} for {GREATER/LESS:537111078} 30 days.      Update Admission H&P: {CHP DME Changes in HandP:818389566:::0}    PHYSICIAN SIGNATURE:  {Esignature:706852791:::0}

## 2020-12-23 NOTE — PROGRESS NOTES
Medication Reconciliation     List of medications patient is currently taking is complete. Source of information:   1. Conversation with patient at bedside  2. [facility] records        Notes regarding home medications:  1. Lantus is now 60 units nightly    2. Removed Depakote, Cymbalta, and aspirin.  Patient states she no longer takes these      Lizeth Medina, Pharmacy Intern  12/22/2020 10:24 PM

## 2020-12-23 NOTE — PROGRESS NOTES
Pt transferred from ED to 5114, monitor applied, MW notified. Oriented pt to room, call light, hosp policy. Pt c/o chest pain, notified NP on call and received order for ultram.  Pt teary eyed thinking of son that was murdered 18 years ago. She expressed that she would like some therapy to help her cope.

## 2020-12-23 NOTE — CONSULTS
Chart reviewed. No acute ECG changes seen  Normal troponin levels    No ACS. No further cardiology work up or consult is required.      Thomas Hardin MD

## 2020-12-23 NOTE — H&P
PHYSICAL EXAMINATION:  The patient was examined by me in the emergency  room. VITAL SIGNS:  Temperature is 97.8, respiratory rate 20, pulse 61, blood  pressure 215/81, saturating 100%. CNS:  Alert, awake, and oriented. PSYCH:  The patient is cooperative, answering questions appropriately. EYES:  Pupils are reactive to light. ENT:  Extraocular muscle movements are intact. RESPIRATORY SYSTEM:  No obvious rales or rhonchi. CVS:  Nontachycardic. Regular rate and rhythm. ABDOMEN:  Nondistended. MUSCULOSKELETAL EXAM:  No acute obvious musculoskeletal deformities. SKIN:  Without rashes or lesions. DIAGNOSTIC DATA:  EKG independently reviewed by me showed normal sinus  rhythm. BUN 17, creatinine 1.5, sodium 141, potassium 4.0.  CBC showed a white  count of 5.7, hemoglobin 8.5, hematocrit 25.4, platelets of 165. Chest x-ray shows no significant findings. Troponin is less than 0.01. REVIEW OF PREVIOUS MEDICAL RECORDS:  Shows that the patient had a  coronary angiography done in 2018, for unstable angina by Dr. Polly Redmond that  showed 70% distal LAD stenosis. At this time, medical management is  recommended. ASSESSMENT:  1. Chest pain with possibility of unstable angina. 2.  Coronary artery disease. 3.  Hypertension. 4.  Dyslipidemia. 5.  Severe chronic migraine. PLAN OF CARE:  The patient is admitted to Internal Medicine Service to  Observation. Telemetry monitoring will be continued. Cardiology consult  will be requested. N.p.o. after midnight except for the medications  including dual-agent antiplatelet therapy and beta-blockade. Further management will be per Cardiology recommendations. CODE STATUS:  Full. EXPECTED LENGTH OF STAY:  Less than two midnights based on the plan of  care above. RISK:  High due to the patient's presentation with the chest pain. DISPOSITION:  Observation/Telemetry.         Lima Salcedo MD D:  12/23/2020 2:34:34       T: 12/23/2020 3:37:54     [email or web address]  Job#: [id number]     Doc#: [id number]    CC:

## 2020-12-23 NOTE — PROGRESS NOTES
IV and telemetry monitor removed. Discharge paperwork completed and discussed with the patient. All questions answered. Patient has been made aware of follow up appointments that need to be made and patient verbalized understanding. Patient has been given all prescriptions that have been filled with [facility] and patient participated in [address] to [facility]. All belongings have been packed up and sent with the patient. Patient will be driven home by daughter.

## 2020-12-23 NOTE — PROGRESS NOTES
CLINICAL PHARMACY NOTE: MEDS TO [address] [place] [address] Drive [facility] Patient?: No  Total # of Prescriptions Filled: 1   The following medications were delivered to the patient:  Current Discharge Medication List      START taking these medications    Details   aspirin 81 MG chewable tablet Take 1 tablet by mouth daily  Qty: 30 tablet, Refills: 3         ·   ·   Total # of Interventions Completed: 0  Time Spent (min): 30    Additional Documentation:

## 2020-12-23 NOTE — PROGRESS NOTES
Comprehensive Nutrition Assessment    Type and Reason for Visit:  Initial, Positive Nutrition Screen    Nutrition Recommendations/Plan:   Will order Glucerna BID when po resumes   Will monitor nutritional adequacy, nutrition-related labs, weights, BMs, and clinical progress     Nutrition Assessment:  + Screen for malnutrition score of 2. Pt presented with chest pain. Hx includes CAD, COPD, CHF, CKD, DM, HLD, HTN. Noted 6 lb wt loss in past 2 months (not significant). Currently NPO for testing today. Will order ONS when po resumes. Malnutrition Assessment:  Malnutrition Status:  Insufficient data      Due to current CDC guidelines recommending 6-ft distancing for social isolation for COVID19 prevention, NFPE/malnutrition assessment was deferred at this time. Estimated Daily Nutrient Needs:  Energy (kcal):  3030-5096 kcal (15-18 kcal/kg ABW); Weight Used for Energy Requirements:  Current     Protein (g):  45-56gm (0.8-1 gm/kg ABW); Weight Used for Protein Requirements:  Current        Fluid (ml/day):  less than 64 oz; Method Used for Fluid Requirements:  Other (Comment)      Nutrition Related Findings:  Reviewed labs      Wounds:  None       Current Nutrition Therapies:    Diet NPO, After Midnight    Anthropometric Measures:  · Height: 5' (152.4 cm)  · Current Body Weight: 123 lb (55.8 kg)   · Usual Body Weight: 129 lb (58.5 kg)     · Ideal Body Weight: 100 lbs; % Ideal Body Weight 123 %   · BMI: 24  · Adjusted Body Weight:  ; No Adjustment   · BMI Categories: Normal Weight (BMI 18.5-24. 9)       Nutrition Diagnosis:   · Inadequate oral intake related to inadequate protein-energy intake as evidenced by NPO or clear liquid status due to medical condition      Nutrition Interventions:   Food and/or Nutrient Delivery:  Start Oral Diet, Start Oral Nutrition Supplement  Nutrition Education/Counseling:  No recommendation at this time   Coordination of Nutrition Care:  Continue to monitor while inpatient    Goals:   Tolerate diet and consume greater than 50% of meals and supplements this admission       Nutrition Monitoring and Evaluation:   Behavioral-Environmental Outcomes:  None Identified   Food/Nutrient Intake Outcomes:  Food and Nutrient Intake, Supplement Intake  Physical Signs/Symptoms Outcomes:  Biochemical Data, Nutrition Focused Physical Findings, Skin, Weight     Discharge Planning:    No discharge needs at this time     Electronically signed by Keenan Andres RD, LD on 12/23/20 at 8:51 AM EST    Contact: [phone]

## 2020-12-28 ENCOUNTER — TELEPHONE (OUTPATIENT)
Dept: PAIN MANAGEMENT | Age: 65
End: 2020-12-28

## 2020-12-28 ENCOUNTER — VIRTUAL VISIT (OUTPATIENT)
Dept: PAIN MANAGEMENT | Age: 65
End: 2020-12-28
Payer: COMMERCIAL

## 2020-12-28 PROCEDURE — 3017F COLORECTAL CA SCREEN DOC REV: CPT | Performed by: INTERNAL MEDICINE

## 2020-12-28 PROCEDURE — G8428 CUR MEDS NOT DOCUMENT: HCPCS | Performed by: INTERNAL MEDICINE

## 2020-12-28 PROCEDURE — 99213 OFFICE O/P EST LOW 20 MIN: CPT | Performed by: INTERNAL MEDICINE

## 2020-12-28 PROCEDURE — 3052F HG A1C>EQUAL 8.0%<EQUAL 9.0%: CPT | Performed by: INTERNAL MEDICINE

## 2020-12-28 PROCEDURE — 2022F DILAT RTA XM EVC RTNOPTHY: CPT | Performed by: INTERNAL MEDICINE

## 2020-12-28 RX ORDER — QUETIAPINE FUMARATE 25 MG/1
25-50 TABLET, FILM COATED ORAL NIGHTLY
Qty: 60 TABLET | Refills: 1 | Status: SHIPPED | OUTPATIENT
Start: 2020-12-28 | End: 2021-02-02

## 2020-12-28 RX ORDER — OXYCODONE AND ACETAMINOPHEN 7.5; 325 MG/1; MG/1
1 TABLET ORAL EVERY 6 HOURS PRN
Qty: 84 TABLET | Refills: 0 | Status: SHIPPED | OUTPATIENT
Start: 2020-12-28 | End: 2021-01-21 | Stop reason: SDUPTHER

## 2020-12-28 RX ORDER — RIZATRIPTAN BENZOATE 10 MG/1
10 TABLET ORAL
Qty: 9 TABLET | Refills: 0 | Status: SHIPPED | OUTPATIENT
Start: 2020-12-28 | End: 2021-02-02 | Stop reason: SDUPTHER

## 2020-12-28 RX ORDER — ERENUMAB-AOOE 70 MG/ML
140 INJECTION SUBCUTANEOUS
Qty: 1 PEN | Refills: 1 | Status: SHIPPED | OUTPATIENT
Start: 2020-12-28 | End: 2021-02-02

## 2020-12-28 NOTE — PROGRESS NOTES
TELE HEALTH VISIT (AUDIO-VISUAL)    Pursuant to the emergency declaration under the [facility], [facility] waiver authority and the [employer] Act, this Virtual  Visit was conducted, with patient's/legal guardian's consent, to reduce the patient's risk of exposure to COVID-19 and provide continuity of care for an established patient. Service is  provided through a video synchronous discussion virtually to substitute for in-person clinic visit. Due to this being a TeleHealth encounter (During [facility]- public health emergency), evaluation of the following organ systems was limited: Vitals/Constitutional/EENT/Resp/CV/GI//MS/Neuro/Skin/Uqoq-Fucd-Vio. Lesiaalia Teri  1955  [id number]    Ms. Simona Mazariegos is being seen virtually for a follow up visit using one of the following techniques  Google Duo, Face time or Doxy. me  Informed verbal consent to the virtual visit was obtained from Ms. Kilpatrickry. Risks associated with HIPPA compliance with the virtual visit was explained to the patient. Ms. Simona Mazariegos is at her residence and Dr. Amy Armenta is in his office. HISTORY OF PRESENT ILLNESS:  Ms. Simona Mazariegos is a 60 y.o. female returns for a follow up visit for multiple medical problems. Her current presenting problems are   1. Chronic pain syndrome    2. DDD (degenerative disc disease), lumbar    3. Fibromyalgia    4. Chronic painful diabetic neuropathy ([address]    5. Tendinitis of right rotator cuff    6. Rotator cuff tendonitis, right    7. DDD (degenerative disc disease), cervical    8. Intractable migraine with aura without status migrainosus    .     As per information/history obtained from the PADT(patient assessment and documentation tool) - She complains of pain in the neck and lower back with radiation to the shoulders Bilateral, arms Bilateral, elbows Bilateral, hands Bilateral, buttocks, hips Bilateral, upper leg Bilateral, knees Bilateral, lower leg Bilateral, ankles Bilateral and feet Bilateral She rates the pain 9/10 and describes it as sharp, aching, burning, numbness, pins and needles. Pain is made worse by: movement, walking, standing, sitting, bending, lifting. Current treatment regimen has helped relieve about 10% of the pain. She denies side effects from the current pain regimen. Patient reports that since the last follow up visit the physical functioning is unchanged, family/social relationships are unchanged, mood is unchanged and sleep patterns are unchanged, and that the overall functioning is unchanged. Patient denies neurological bowel or bladder. Patient denies misusing/abusing her narcotic pain medications or using any illegal drugs. There are No indicators for possible drug abuse, addiction or diversion problems. Upon obtaining the medical history from Ms. Meza regarding today's office visit for her presenting problems, patient states she has been in the hospital, \" throwing up blood\". Doing better. She mentions she had scope done, all okay. She says she is using all her medications. She reports she missed her appointment and is out of medication. She says she is using Percocet 2-3 per day. ALLERGIES: Patients list of allergies were reviewed     MEDICATIONS: Ms. Edwina Guerra list of medications were reviewed. Her current medications are   Outpatient Medications Prior to Visit   Medication Sig Dispense Refill    aspirin 81 MG chewable tablet Take 1 tablet by mouth daily 30 tablet 3    dicyclomine (BENTYL) 20 MG tablet Take 20 mg by mouth 4 times daily (before meals and nightly)       nitroGLYCERIN (NITROSTAT) 0.4 MG SL tablet Place 1 tablet under the tongue every 5 minutes as needed for Chest pain up to max of 3 total doses.  If no relief after 1 dose, call 911. 25 tablet 3    Nutritional Supplements (ENSURE) LIQD Take 1 Can by mouth 3 times daily as needed (nutrition) 90 Can 5    omeprazole (PRILOSEC) 20 MG delayed release capsule TAKE 1 CAPSULE BY MOUTH DAILY 30 capsule 1    Erenumab-aooe (AIMOVIG, 140 MG DOSE,) 70 MG/ML SOAJ Inject 140 mLs into the skin every 30 days 1 pen 1    QUEtiapine (SEROQUEL) 25 MG tablet Take 1-2 tablets by mouth nightly 60 tablet 1    insulin glargine (BASAGLAR KWIKPEN) 100 UNIT/ML injection pen Inject 40 Units into the skin nightly (Patient taking differently: Inject 60 Units into the skin nightly ) 15 mL 5    hydrALAZINE (APRESOLINE) 50 MG tablet Take 1 tablet by mouth 2 times daily 60 tablet 5    metoprolol succinate (TOPROL XL) 50 MG extended release tablet Take 0.5 tablets by mouth 2 times daily (with meals) 30 tablet 5    isosorbide mononitrate (IMDUR) 30 MG extended release tablet Take 1 tablet by mouth daily 90 tablet 5    atorvastatin (LIPITOR) 80 MG tablet Take 1 tablet by mouth nightly 90 tablet 5    amLODIPine (NORVASC) 5 MG tablet Take 1 tablet by mouth 2 times daily 180 tablet 5    lidocaine (XYLOCAINE) 5 % ointment Apply topically as needed.  5 g 3    blood glucose test strips (TRUE METRIX BLOOD GLUCOSE TEST) strip 1 each by Does not apply route 2 times daily 100 each 5    Blood Glucose Monitoring Suppl (TRUE METRIX METER) w/Device KIT 1 kit by Does not apply route 2 times daily 1 kit 0    ondansetron (ZOFRAN) 4 MG tablet Take 1 tablet by mouth 3 times daily as needed for Nausea or Vomiting 30 tablet 0    clopidogrel (PLAVIX) 75 MG tablet TAKE 1 TABLET BY MOUTH DA JULIEN 90 tablet 5    UltiCare Alcohol Swabs 70 % PADS USE TO TEST BLOOD GLUCOSE THREE TIMES DAILY 100 each 5    Nebulizers (COMPRESSOR/NEBULIZER) MISC 1 Device by Does not apply route 4 times daily as needed (SOB / Wheezing) 1 each 0    albuterol (PROVENTIL) (2.5 MG/3ML) 0.083% nebulizer solution Take 3 mLs by nebulization every 4 hours as needed for Wheezing 120 each 5    Lancet Devices (SIMPLE DIAGNOSTICS LANCING DEV) MISC USE WITH LANCETS TO TEST BLOOD GLUCOSE 1 each 11    Pharmacist Choice Lancets MISC USE TO TEST BLOOD GLUCOSE THREE TIMES DAILY 300 each 5    UNIFINE PENTIPS 31G X 8 MM MISC USE WITH insulin pens 100 each 5    budesonide-formoterol (SYMBICORT) 160-4.5 MCG/ACT AERO Inhale 2 puffs into the lungs daily 2 Inhaler 5    albuterol sulfate HFA (VENTOLIN HFA) 108 (90 Base) MCG/ACT inhaler Inhale 2 puffs into the lungs every 4 hours as needed for Wheezing or Shortness of Breath 3 Inhaler 5    ranolazine (RANEXA) 1000 MG extended release tablet Take 1 tablet by mouth 2 times daily 60 tablet 8    rizatriptan (MAXALT) 10 MG tablet Take 1 tablet by mouth once as needed for Migraine May repeat in 2 hours if needed 9 tablet 0     No facility-administered medications prior to visit. REVIEW OF SYSTEMS:    Respiratory: Negative for apnea, chest tightness and shortness of breath or change in baseline breathing. PHYSICAL EXAM:   Nursing note and vitals reviewed. There were no vitals taken for this visit. Constitutional: She appears well-developed and well-nourished. No acute distress. Cardiovascular: Normal rate, regular rhythm, normal heart sounds, and does not have murmur. Pulmonary/Chest: Effort normal. No respiratory distress. She does not have wheezes in the lung fields. She has no rales. Neurological/Psychiatric:She is alert and oriented to person, place, and time. Coordination is  normal.  Her mood isAppropriate and affect is Neutral/Euthymic(normal) . IMPRESSION:   1. Chronic pain syndrome    2. DDD (degenerative disc disease), lumbar    3. Fibromyalgia    4. Chronic painful diabetic neuropathy ([facility] [address]    5. Tendinitis of right rotator cuff    6. Rotator cuff tendonitis, right    7. DDD (degenerative disc disease), cervical    8.  Intractable migraine with aura without status migrainosus        PLAN:  Informed verbal consent was obtained  -Continue with current opioid regimen Percocet 2-3 per day   -She was advised to increase fluids ( 5-7  glasses of fluid daily), limit caffeine, avoid cheese products, increase dietary fiber, increase activity and exercise as tolerated and relax regularly and enjoy meals   -Interim hisotry reviewed   -Walking as tolerated   -she was advised  to avoid using too many OTC analgesics to control the headaches, avoid chocolates, increased caffeine, cheeses and MSG nitrite containing foods, cigarette smoking. To avoid bright lights, strong smells and skipping meals. Current Outpatient Medications   Medication Sig Dispense Refill    aspirin 81 MG chewable tablet Take 1 tablet by mouth daily 30 tablet 3    dicyclomine (BENTYL) 20 MG tablet Take 20 mg by mouth 4 times daily (before meals and nightly)       nitroGLYCERIN (NITROSTAT) 0.4 MG SL tablet Place 1 tablet under the tongue every 5 minutes as needed for Chest pain up to max of 3 total doses.  If no relief after 1 dose, call 911. 25 tablet 3    Nutritional Supplements (ENSURE) LIQD Take 1 Can by mouth 3 times daily as needed (nutrition) 90 Can 5    omeprazole (PRILOSEC) 20 MG delayed release capsule TAKE 1 CAPSULE BY MOUTH DAILY 30 capsule 1    Erenumab-aooe (AIMOVIG, 140 MG DOSE,) 70 MG/ML SOAJ Inject 140 mLs into the skin every 30 days 1 pen 1    QUEtiapine (SEROQUEL) 25 MG tablet Take 1-2 tablets by mouth nightly 60 tablet 1    insulin glargine (BASAGLAR KWIKPEN) 100 UNIT/ML injection pen Inject 40 Units into the skin nightly (Patient taking differently: Inject 60 Units into the skin nightly ) 15 mL 5    hydrALAZINE (APRESOLINE) 50 MG tablet Take 1 tablet by mouth 2 times daily 60 tablet 5    metoprolol succinate (TOPROL XL) 50 MG extended release tablet Take 0.5 tablets by mouth 2 times daily (with meals) 30 tablet 5    isosorbide mononitrate (IMDUR) 30 MG extended release tablet Take 1 tablet by mouth daily 90 tablet 5    atorvastatin (LIPITOR) 80 MG tablet Take 1 tablet by mouth nightly 90 tablet 5    amLODIPine (NORVASC) 5 MG tablet Take 1 tablet by mouth 2 times daily 180 tablet 5  lidocaine (XYLOCAINE) 5 % ointment Apply topically as needed. 5 g 3    blood glucose test strips (TRUE METRIX BLOOD GLUCOSE TEST) strip 1 each by Does not apply route 2 times daily 100 each 5    Blood Glucose Monitoring Suppl (TRUE METRIX METER) w/Device KIT 1 kit by Does not apply route 2 times daily 1 kit 0    ondansetron (ZOFRAN) 4 MG tablet Take 1 tablet by mouth 3 times daily as needed for Nausea or Vomiting 30 tablet 0    clopidogrel (PLAVIX) 75 MG tablet TAKE 1 TABLET BY MOUTH DA JULIEN 90 tablet 5    UltiCare Alcohol Swabs 70 % PADS USE TO TEST BLOOD GLUCOSE THREE TIMES DAILY 100 each 5    Nebulizers (COMPRESSOR/NEBULIZER) MISC 1 Device by Does not apply route 4 times daily as needed (SOB / Wheezing) 1 each 0    albuterol (PROVENTIL) (2.5 MG/3ML) 0.083% nebulizer solution Take 3 mLs by nebulization every 4 hours as needed for Wheezing 120 each 5    Lancet Devices (SIMPLE DIAGNOSTICS LANCING DEV) MISC USE WITH LANCETS TO TEST BLOOD GLUCOSE 1 each 11    Pharmacist Choice Lancets MISC USE TO TEST BLOOD GLUCOSE THREE TIMES DAILY 300 each 5    UNIFINE PENTIPS 31G X 8 MM MISC USE WITH insulin pens 100 each 5    budesonide-formoterol (SYMBICORT) 160-4.5 MCG/ACT AERO Inhale 2 puffs into the lungs daily 2 Inhaler 5    albuterol sulfate HFA (VENTOLIN HFA) 108 (90 Base) MCG/ACT inhaler Inhale 2 puffs into the lungs every 4 hours as needed for Wheezing or Shortness of Breath 3 Inhaler 5    ranolazine (RANEXA) 1000 MG extended release tablet Take 1 tablet by mouth 2 times daily 60 tablet 8    rizatriptan (MAXALT) 10 MG tablet Take 1 tablet by mouth once as needed for Migraine May repeat in 2 hours if needed 9 tablet 0     No current facility-administered medications for this visit. I will continue her current medication regimen  which is part of the above treatment schedule. It has been helping with Ms. Meza's chronic  medical problems which for this visit include:   Diagnoses of Chronic pain syndrome, DDD (degenerative disc disease), lumbar, Fibromyalgia, Chronic painful diabetic neuropathy (HCC), Tendinitis of right rotator cuff, Rotator cuff tendonitis, right, DDD (degenerative disc disease), cervical, and Intractable migraine with aura without status migrainosus were pertinent to this visit. Risks and benefits of the medications and other alternative treatments  including no treatment were discussed with the patient. The common side effects of these medications were also explained to the patient. Informed verbal consent was obtained. Goals of current treatment regimen include improvement in pain, restoration of functioning- with focus on improvement in physical performance, general activity, work or disability,emotional distress, health care utilization and  decreased medication consumption. Will continue to monitor progress towards achieving/maintaining therapeutic goals with special emphasis on  1. Improvement in perceived interfernce  of pain with ADL's. Ability to do home exercises independently. Ability to do household chores indoor and/or outdoor work and social and leisure activities. Improve psychosocial and physical functioning. - she is showing progression towards this treatment goal with the current regimen. She was advised against drinking alcohol with the narcotic pain medicines, advised against driving or handling machinery while adjusting the dose of medicines or if having cognitive  issues related to the current medications. Risk of overdose and death, if medicines not taken as prescribed, were also discussed. If the patient develops new symptoms or if the symptoms worsen, the patient should call the office. While transcribing every attempt was made to maintain the accuracy of the note in terms of it's contents,there may have been some errors made inadvertently. Thank you for allowing me to participate in the care of this patient.     Geno Jacobsen MD.    Cc: Karyn Trivedi Rosy Boucher MD

## 2020-12-28 NOTE — TELEPHONE ENCOUNTER
Submitted PA for AIMOVIG  Via CMM Key: [id number]   STATUS: APPROVED. The patient has been getting them already so need to notify.

## 2021-01-11 ENCOUNTER — TELEPHONE (OUTPATIENT)
Dept: PRIMARY CARE CLINIC | Age: 66
End: 2021-01-11

## 2021-01-11 NOTE — TELEPHONE ENCOUNTER
Pt wants to know if you will prescribe something for severe migraine headaches. Pt states that she is experiencing more headaches this week then last week.   Last ov: 12/18/20    Evelyn: [phone]    Pt winsmoe: [phone]

## 2021-01-12 DIAGNOSIS — G89.4 CHRONIC PAIN SYNDROME: ICD-10-CM

## 2021-01-13 NOTE — TELEPHONE ENCOUNTER
Patient called stating she's had a headache for a week, and the injections are not working. She wants to know if [facility] will prescribe something else for her.     [address] / Julio Ward

## 2021-01-15 RX ORDER — RIMEGEPANT SULFATE 75 MG/75MG
1 TABLET, ORALLY DISINTEGRATING ORAL DAILY
Qty: 8 TABLET | Refills: 0 | Status: SHIPPED | OUTPATIENT
Start: 2021-01-15 | End: 2021-02-19

## 2021-01-18 NOTE — TELEPHONE ENCOUNTER
Submitted PA for [employer])  Via CMM Key: [id number]   STATUS: The insurance wants to know why she can't use:    NARATRIPTAN ( AMERGE), ZOLMITRIPTAN ( ZOMIG),  DIHYDROERGOTAMINE NS, ERGOTAMINE-CAFFEINE. Please advise.

## 2021-01-19 NOTE — TELEPHONE ENCOUNTER
Patient called stating she was told by her pharmacy that PA was denied. She wants to know if something else can be prescribed.     [address] [facility] [address] / Julio Ward

## 2021-01-19 NOTE — TELEPHONE ENCOUNTER
The patient has not tried any of the \"NARATRIPTAN ( Russ Gums), ZOLMITRIPTAN ( ZOMIG),  DIHYDROERGOTAMINE NS, ERGOTAMINE-CAFFEINE. \"  Atleast that shows in the [facility] chart. I would need an office note stating the patient tried those medications and they did not work.

## 2021-01-21 ENCOUNTER — VIRTUAL VISIT (OUTPATIENT)
Dept: PAIN MANAGEMENT | Age: 66
End: 2021-01-21
Payer: COMMERCIAL

## 2021-01-21 DIAGNOSIS — M51.36 DDD (DEGENERATIVE DISC DISEASE), LUMBAR: ICD-10-CM

## 2021-01-21 DIAGNOSIS — M75.81 ROTATOR CUFF TENDONITIS, RIGHT: ICD-10-CM

## 2021-01-21 DIAGNOSIS — M79.7 FIBROMYALGIA: ICD-10-CM

## 2021-01-21 DIAGNOSIS — G89.4 CHRONIC PAIN SYNDROME: ICD-10-CM

## 2021-01-21 DIAGNOSIS — M75.81 TENDINITIS OF RIGHT ROTATOR CUFF: ICD-10-CM

## 2021-01-21 DIAGNOSIS — E11.40 CHRONIC PAINFUL DIABETIC NEUROPATHY (HCC): ICD-10-CM

## 2021-01-21 DIAGNOSIS — M50.30 DDD (DEGENERATIVE DISC DISEASE), CERVICAL: ICD-10-CM

## 2021-01-21 PROCEDURE — 3046F HEMOGLOBIN A1C LEVEL >9.0%: CPT | Performed by: INTERNAL MEDICINE

## 2021-01-21 PROCEDURE — G8484 FLU IMMUNIZE NO ADMIN: HCPCS | Performed by: INTERNAL MEDICINE

## 2021-01-21 PROCEDURE — G8427 DOCREV CUR MEDS BY ELIG CLIN: HCPCS | Performed by: INTERNAL MEDICINE

## 2021-01-21 PROCEDURE — 99213 OFFICE O/P EST LOW 20 MIN: CPT | Performed by: INTERNAL MEDICINE

## 2021-01-21 PROCEDURE — 1036F TOBACCO NON-USER: CPT | Performed by: INTERNAL MEDICINE

## 2021-01-21 PROCEDURE — G8420 CALC BMI NORM PARAMETERS: HCPCS | Performed by: INTERNAL MEDICINE

## 2021-01-21 PROCEDURE — 3017F COLORECTAL CA SCREEN DOC REV: CPT | Performed by: INTERNAL MEDICINE

## 2021-01-21 PROCEDURE — 2022F DILAT RTA XM EVC RTNOPTHY: CPT | Performed by: INTERNAL MEDICINE

## 2021-01-21 RX ORDER — OXYCODONE AND ACETAMINOPHEN 7.5; 325 MG/1; MG/1
1 TABLET ORAL EVERY 6 HOURS PRN
Qty: 84 TABLET | Refills: 0 | Status: SHIPPED | OUTPATIENT
Start: 2021-01-21 | End: 2021-02-19 | Stop reason: SDUPTHER

## 2021-01-21 NOTE — PROGRESS NOTES
TELE HEALTH VISIT (AUDIO-VISUAL)    Pursuant to the emergency declaration under the [facility], Counts include 234 beds at the [facility] waiver authority and the [employer] Act, this Virtual  Visit was conducted, with patient's/legal guardian's consent, to reduce the patient's risk of exposure to COVID-19 and provide continuity of care for an established patient. Service is  provided through a video synchronous discussion virtually to substitute for in-person clinic visit. Due to this being a TeleHealth encounter (During [facility] public health emergency), evaluation of the following organ systems was limited: Vitals/Constitutional/EENT/Resp/CV/GI//MS/Neuro/Skin/Jaxc-Idbf-Mkn. Cathy Stamp  1955  [id number]    Ms. Sandy Glasgow is being seen virtually for a follow up visit using one of the following techniques   [email or web address] me  Informed verbal consent to the virtual visit was obtained from MsJam Susana. Risks associated with HIPPA compliance with the virtual visit was explained to the patient. Ms. Sandy Glasgow is at her residence and Dr. Shannon Yepez is in his office. HISTORY OF PRESENT ILLNESS:  Ms. Sandy Glasgow is a 60 y.o. female returns for a follow up visit for multiple medical problems. Her current presenting problems are   1. Chronic pain syndrome    2. DDD (degenerative disc disease), lumbar    3. Fibromyalgia    4. Chronic painful diabetic neuropathy ([address]    5. Tendinitis of right rotator cuff    6. Rotator cuff tendonitis, right    7. DDD (degenerative disc disease), cervical    8. Intractable migraine with aura without status migrainosus    9. Moderate episode of recurrent major depressive disorder ([address]    . As per information/history obtained from the PADT(patient assessment and documentation tool) - She complains of pain in the head with radiation to the upper leg Bilateral and lower leg Bilateral She rates the pain 8/10 and describes it as aching.   Pain is made worse by: movement. Current treatment regimen has helped relieve about 10% of the pain. She denies side effects from the current pain regimen. Patient reports that since the last follow up visit the physical functioning is unchanged, family/social relationships are unchanged, mood is worse and sleep patterns are unchanged, and that the overall functioning is unchanged. Patient denies neurological bowel or bladder. Patient denies misusing/abusing her narcotic pain medications or using any illegal drugs. There are No indicators for possible drug abuse, addiction or diversion problems. Upon obtaining the medical history from Ms. [address] [facility] regarding today's office visit for her presenting problems, Ms. [address] [facility] states she has been having increase headaches, she states insurance is not covering the Nurtec, she states she is using Aimovig along with the Imitrex. Patient mentions she is using Percocet 3 per day. Patient BP is high today, she is on medications also. Patient denies any constipation symptoms. Patient reports she has been managing light house chores. ALLERGIES: Patients list of allergies were reviewed     MEDICATIONS: Ms. [address] [facility] list of medications were reviewed. Her current medications are   Outpatient Medications Prior to Visit   Medication Sig Dispense Refill    Rimegepant Sulfate (NURTEC) 75 MG TBDP Take 1 tablet by mouth daily 8 tablet 0    Erenumab-aooe (AIMOVIG, 140 MG DOSE,) 70 MG/ML SOAJ Inject 140 mLs into the skin every 30 days 1 pen 1    QUEtiapine (SEROQUEL) 25 MG tablet Take 1-2 tablets by mouth nightly 60 tablet 1    oxyCODONE-acetaminophen (PERCOCET) 7.5-325 MG per tablet Take 1 tablet by mouth every 6 hours as needed for Pain (max 3-4 day) for up to 28 days.  84 tablet 0    aspirin 81 MG chewable tablet Take 1 tablet by mouth daily 30 tablet 3    dicyclomine (BENTYL) 20 MG tablet Take 20 mg by mouth 4 times daily (before meals and nightly)       nitroGLYCERIN (NITROSTAT) 0.4 MG SL tablet Place 1 tablet under the tongue every 5 minutes as needed for Chest pain up to max of 3 total doses. If no relief after 1 dose, call 911. 25 tablet 3    Nutritional Supplements (ENSURE) LIQD Take 1 Can by mouth 3 times daily as needed (nutrition) 90 Can 5    omeprazole (PRILOSEC) 20 MG delayed release capsule TAKE 1 CAPSULE BY MOUTH DAILY 30 capsule 1    insulin glargine (BASAGLAR KWIKPEN) 100 UNIT/ML injection pen Inject 40 Units into the skin nightly (Patient taking differently: Inject 60 Units into the skin nightly ) 15 mL 5    hydrALAZINE (APRESOLINE) 50 MG tablet Take 1 tablet by mouth 2 times daily 60 tablet 5    metoprolol succinate (TOPROL XL) 50 MG extended release tablet Take 0.5 tablets by mouth 2 times daily (with meals) 30 tablet 5    isosorbide mononitrate (IMDUR) 30 MG extended release tablet Take 1 tablet by mouth daily 90 tablet 5    atorvastatin (LIPITOR) 80 MG tablet Take 1 tablet by mouth nightly 90 tablet 5    amLODIPine (NORVASC) 5 MG tablet Take 1 tablet by mouth 2 times daily 180 tablet 5    lidocaine (XYLOCAINE) 5 % ointment Apply topically as needed.  5 g 3    blood glucose test strips (TRUE METRIX BLOOD GLUCOSE TEST) strip 1 each by Does not apply route 2 times daily 100 each 5    Blood Glucose Monitoring Suppl (TRUE METRIX METER) w/Device KIT 1 kit by Does not apply route 2 times daily 1 kit 0    ondansetron (ZOFRAN) 4 MG tablet Take 1 tablet by mouth 3 times daily as needed for Nausea or Vomiting 30 tablet 0    clopidogrel (PLAVIX) 75 MG tablet TAKE 1 TABLET BY MOUTH DA JULIEN 90 tablet 5    UltiCare Alcohol Swabs 70 % PADS USE TO TEST BLOOD GLUCOSE THREE TIMES DAILY 100 each 5    Nebulizers (COMPRESSOR/NEBULIZER) MISC 1 Device by Does not apply route 4 times daily as needed (SOB / Wheezing) 1 each 0    albuterol (PROVENTIL) (2.5 MG/3ML) 0.083% nebulizer solution Take 3 mLs by nebulization every 4 hours as needed for Wheezing 120 each 5    Lancet Devices (SIMPLE DIAGNOSTICS LANCING DEV) MISC USE WITH LANCETS TO TEST BLOOD GLUCOSE 1 each 11    Pharmacist Choice Lancets MISC USE TO TEST BLOOD GLUCOSE THREE TIMES DAILY 300 each 5    UNIFINE PENTIPS 31G X 8 MM MISC USE WITH insulin pens 100 each 5    budesonide-formoterol (SYMBICORT) 160-4.5 MCG/ACT AERO Inhale 2 puffs into the lungs daily 2 Inhaler 5    albuterol sulfate HFA (VENTOLIN HFA) 108 (90 Base) MCG/ACT inhaler Inhale 2 puffs into the lungs every 4 hours as needed for Wheezing or Shortness of Breath 3 Inhaler 5    ranolazine (RANEXA) 1000 MG extended release tablet Take 1 tablet by mouth 2 times daily 60 tablet 8    rizatriptan (MAXALT) 10 MG tablet Take 1 tablet by mouth once as needed for Migraine May repeat in 2 hours if needed 9 tablet 0     No facility-administered medications prior to visit. REVIEW OF SYSTEMS:    Respiratory: Negative for apnea, chest tightness and shortness of breath or change in baseline breathing. PHYSICAL EXAM:   Nursing note and vitals reviewed. There were no vitals taken for this visit. Constitutional: She appears well-developed and well-nourished. No acute distress. Cardiovascular: Normal rate, regular rhythm, normal heart sounds, and does not have murmur. Pulmonary/Chest: Effort normal. No respiratory distress. She does not have wheezes in the lung fields. She has no rales. Neurological/Psychiatric:She is alert and oriented to person, place, and time. Coordination is  normal.  Her mood isAppropriate and affect is Neutral/Euthymic(normal) . Her    IMPRESSION:   1. Chronic pain syndrome    2. DDD (degenerative disc disease), lumbar    3. Fibromyalgia    4. Chronic painful diabetic neuropathy ([facility] [address]    5. Tendinitis of right rotator cuff    6. Rotator cuff tendonitis, right    7.  DDD (degenerative disc disease), cervical        PLAN:  Informed verbal consent was obtained  -continue with current opioid regimen Percocet 3 per day  -she was advised  to avoid using too many OTC analgesics to control the headaches, avoid chocolates, increased caffeine, cheeses and MSG nitrite containing foods, cigarette smoking. To avoid bright lights, strong smells and skipping meals. -will give samples of Nurtec   -Continue with all other adjuvant medications as before   -She was advised to increase fluids ( 5-7  glasses of fluid daily), limit caffeine, avoid cheese products, increase dietary fiber, increase activity and exercise as tolerated and relax regularly and enjoy meals   -[place] record was obtained and reviewed  for the last one year and no indicators of drug misuse  were found. Any other controlled substance prescriptions  seen on the record have been accounted for, I am aware of the patient receiving these medications. Lysbeth Carrel [place] record will be rechecked as part of office protocol. Current Outpatient Medications   Medication Sig Dispense Refill    Rimegepant Sulfate (NURTEC) 75 MG TBDP Take 1 tablet by mouth daily 8 tablet 0    Erenumab-aooe (AIMOVIG, 140 MG DOSE,) 70 MG/ML SOAJ Inject 140 mLs into the skin every 30 days 1 pen 1    QUEtiapine (SEROQUEL) 25 MG tablet Take 1-2 tablets by mouth nightly 60 tablet 1    oxyCODONE-acetaminophen (PERCOCET) 7.5-325 MG per tablet Take 1 tablet by mouth every 6 hours as needed for Pain (max 3-4 day) for up to 28 days. 84 tablet 0    aspirin 81 MG chewable tablet Take 1 tablet by mouth daily 30 tablet 3    dicyclomine (BENTYL) 20 MG tablet Take 20 mg by mouth 4 times daily (before meals and nightly)       nitroGLYCERIN (NITROSTAT) 0.4 MG SL tablet Place 1 tablet under the tongue every 5 minutes as needed for Chest pain up to max of 3 total doses.  If no relief after 1 dose, call 911. 25 tablet 3    Nutritional Supplements (ENSURE) LIQD Take 1 Can by mouth 3 times daily as needed (nutrition) 90 Can 5    omeprazole (PRILOSEC) 20 MG delayed release capsule TAKE 1 CAPSULE BY MOUTH DAILY 30 capsule 1    insulin glargine (BASAGLAR current regimen. She was advised against drinking alcohol with the narcotic pain medicines, advised against driving or handling machinery while adjusting the dose of medicines or if having cognitive  issues related to the current medications. Risk of overdose and death, if medicines not taken as prescribed, were also discussed. If the patient develops new symptoms or if the symptoms worsen, the patient should call the office. While transcribing every attempt was made to maintain the accuracy of the note in terms of it's contents,there may have been some errors made inadvertently. Thank you for allowing me to participate in the care of this patient.     Giovana Quiñones MD.    Cc: Liam Humphrey MD

## 2021-01-22 NOTE — TELEPHONE ENCOUNTER
Spoke with rick told her dr Matt Jj does not want to prescribe anything else and he would discuss other options at her next appt

## 2021-01-25 RX ORDER — TIZANIDINE 4 MG/1
TABLET ORAL
Qty: 46 TABLET | Refills: 1 | OUTPATIENT
Start: 2021-01-25

## 2021-01-26 ENCOUNTER — TELEPHONE (OUTPATIENT)
Dept: PRIMARY CARE CLINIC | Age: 66
End: 2021-01-26

## 2021-01-26 RX ORDER — PROMETHAZINE HYDROCHLORIDE 25 MG/1
25 TABLET ORAL EVERY 8 HOURS PRN
Qty: 20 TABLET | Refills: 1 | Status: SHIPPED | OUTPATIENT
Start: 2021-01-26 | End: 2021-02-02

## 2021-01-26 RX ORDER — ONDANSETRON 4 MG/1
4 TABLET, FILM COATED ORAL EVERY 8 HOURS PRN
Qty: 30 TABLET | Refills: 1 | Status: SHIPPED | OUTPATIENT
Start: 2021-01-26 | End: 2021-01-28 | Stop reason: CLARIF

## 2021-01-26 NOTE — TELEPHONE ENCOUNTER
Patient wants to know if Dr. Bladimir Richardson will prescribe something for nausea. Please call and advise.

## 2021-01-27 ENCOUNTER — TELEPHONE (OUTPATIENT)
Dept: CARDIOLOGY CLINIC | Age: 66
End: 2021-01-27

## 2021-01-27 NOTE — PROGRESS NOTES
[address]  Office Visit    Jose Daniel Ivy  1955    January 28, 2021    CC:   Chief Complaint   Patient presents with    Follow-up     dizziness and palpitations      HPI:  The patient is 60 y.o. female with a past medical history significant for CAD/stents, DHF, HTN, HLP, anxiety, fibromyalgia,type II diabetes mellitus, atrial fib (S/P ablation x 2), COPD, drug seeking behavior, depression, chronic pain syndrome and H/O multiple hospitalizations for a multitude of complaints (predominantly chest pain) who is here at her request d/t c/o \"breakthrough atrial fib. \"  Called office stating she is having increased episodes of fast HR (up to 115's) noted on her BP monitor. She called requesting to be seen. Has been referred to [facility] in the past by Dr. Kami Sidhu (did not go d/t lack of insurance coverage). Overall has been having increased palpitations over last few days associated with dizziness, palpitations and fatigue. Dizziness can last up to 1-2 minutes and occurs when she stands and starts walking. NTG yesterday d/t chest pain and improved. + chronic SOBOE unchanged. Denies  orthopnea/PND, cough,  syncope, edema , weight change or claudication. Has been using inhaler with some improvement. Palpitations occurring off and on and last few minutes. Has had issues in the past Stopped isosorbide 1 week ago    Review of Systems:  Constitutional: Denies  fatigue, weakness, night sweats or fever. HEENT: Denies new visual changes, ringing in ears, nosebleeds, nasal congestion  Respiratory: Denies new or change in SOB, PND, orthopnea or cough. Cardiovascular: see HPI  GI: Denies N/V, diarrhea, constipation, abdominal pain, change in bowel habits, melena or hematochezia  : Denies urinary frequency, urgency, incontinence, hematuria or dysuria.   Skin: Denies rash, hives, or cyanosis  Musculoskeletal: + chronic arthritic pain  Neurological: Denies syncope or TIA-like symptoms. Psychiatric: Denies anxiety, insomnia or depression     Past Medical History:   Diagnosis Date    Acid reflux     Anemia     Anxiety     Arthritis     Asthma     Atrial fibrillation (HCC)     Blood transfusion reaction     CAD (coronary artery disease) 12/3/2012    Cerebral artery occlusion with cerebral infarction (HCC)     CHF (congestive heart failure) (HCC)     Chronic kidney disease     40% kidney functiom    COPD (chronic obstructive pulmonary disease) (HCC)     Depression     DM2 [facility]diabetes mellitus, type 2) ([facility]     Dysarthria     Fibromyalgia 6/7/2016    Headache(784.0) 2/19/2013    Hemisensory loss     Hyperlipidemia     Hypertension     IBS (irritable bowel syndrome)     Inferior vena cava occlusion (HCC)     Irritable bowel syndrome     Keratitis     MI, old     Neuropathy     Superior vena cava obstruction     Temporal arteritis ([facility] [address] 2/24/2014     Past Surgical History:   Procedure Laterality Date    ABLATION OF DYSRHYTHMIC FOCUS      ARTERY BIOPSY Right 04/23/2014    RIGHT TEMPORAL ARTERY BIOPSY    CATARACT REMOVAL Bilateral     CHOLECYSTECTOMY      CORONARY ANGIOPLASTY WITH STENT PLACEMENT      CORONARY ANGIOPLASTY WITH STENT PLACEMENT      HYSTERECTOMY      JOINT REPLACEMENT Right     KNEE ARTHROSCOPY Right     KNEE SURGERY      right knee replacement    PTCA  10/2019    LAD and RCA inrtervention    TUNNELED VENOUS PORT PLACEMENT      left thigh.   SMART PORT    UPPER GASTROINTESTINAL ENDOSCOPY N/A 7/6/2020    EGD DIAGNOSTIC ONLY performed by Dorothy Schroeder MD at [address] [place] [address] Road       Family History   Problem Relation Age of Onset    Diabetes Mother     Hypertension Mother     High Cholesterol Mother     Stroke Mother     Cancer Mother     No Known Problems Paternal Grandfather         lung issues      Social History     Tobacco Use    Smoking status: Former Smoker     Packs/day: 0.50     Years: 35.00     Pack years: 17.50 tablet by mouth nightly 90 tablet 5    amLODIPine (NORVASC) 5 MG tablet Take 1 tablet by mouth 2 times daily 180 tablet 5    lidocaine (XYLOCAINE) 5 % ointment Apply topically as needed. 5 g 3    blood glucose test strips (TRUE METRIX BLOOD GLUCOSE TEST) strip 1 each by Does not apply route 2 times daily 100 each 5    Blood Glucose Monitoring Suppl (TRUE METRIX METER) w/Device KIT 1 kit by Does not apply route 2 times daily 1 kit 0    ondansetron (ZOFRAN) 4 MG tablet Take 1 tablet by mouth 3 times daily as needed for Nausea or Vomiting 30 tablet 0    clopidogrel (PLAVIX) 75 MG tablet TAKE 1 TABLET BY MOUTH DA JULIEN 90 tablet 5    UltiCare Alcohol Swabs 70 % PADS USE TO TEST BLOOD GLUCOSE THREE TIMES DAILY 100 each 5    Nebulizers (COMPRESSOR/NEBULIZER) MISC 1 Device by Does not apply route 4 times daily as needed (SOB / Wheezing) 1 each 0    albuterol (PROVENTIL) (2.5 MG/3ML) 0.083% nebulizer solution Take 3 mLs by nebulization every 4 hours as needed for Wheezing 120 each 5    Lancet Devices (SIMPLE DIAGNOSTICS LANCING DEV) MISC USE WITH LANCETS TO TEST BLOOD GLUCOSE 1 each 11    Pharmacist Choice Lancets MISC USE TO TEST BLOOD GLUCOSE THREE TIMES DAILY 300 each 5    UNIFINE PENTIPS 31G X 8 MM MISC USE WITH insulin pens 100 each 5    budesonide-formoterol (SYMBICORT) 160-4.5 MCG/ACT AERO Inhale 2 puffs into the lungs daily 2 Inhaler 5    albuterol sulfate HFA (VENTOLIN HFA) 108 (90 Base) MCG/ACT inhaler Inhale 2 puffs into the lungs every 4 hours as needed for Wheezing or Shortness of Breath 3 Inhaler 5    ranolazine (RANEXA) 1000 MG extended release tablet Take 1 tablet by mouth 2 times daily 60 tablet 8    rizatriptan (MAXALT) 10 MG tablet Take 1 tablet by mouth once as needed for Migraine May repeat in 2 hours if needed 9 tablet 0     No current facility-administered medications for this visit.         Physical Exam:   /78   Pulse 70   Temp 98.4 °F (36.9 °C)   Ht 5' (1.524 m)   Wt 123 lb 3.2 oz (55.9 kg)   SpO2 100%   BMI 24.06 kg/m²   Wt Readings from Last 2 Encounters:   01/28/21 123 lb 3.2 oz (55.9 kg)   12/23/20 123 lb 3.8 oz (55.9 kg)     Constitutional: She is oriented to person, place, and time. She appears well-developed and well-nourished. In no acute distress. HEENT: Normocephalic and atraumatic. Sclerae anicteric. No xanthelasmas. EOM's intact. Neck:  Supple. No JVD present. Carotids without bruits. No thyromegaly present. No lymphadenopathy present. Cardiovascular: RRR, normal S1 and S2; no murmur/gallop or rub  Pulmonary/Chest: Effort normal.  Lungs clear to auscultation. Chest wall nontender  Abdominal: soft, nontender, nondistended. + bowel sounds; no hepatomegaly   Extremities: No edema, cyanosis, or clubbing. Pulses are 2+ radial/carotid/DP/PT bilaterally. Cap refill brisk. Neurological: No focal deficit. Skin: Skin is warm and dry. Psychiatric: She has a normal mood and affect.  Her speech is normal and behavior is normal.     Lab Review:   Lab Results   Component Value Date    TRIG 131 08/29/2019    HDL 70 08/29/2019    HDL 58 05/14/2012    LDLCALC 89 08/29/2019    LABVLDL 26 08/29/2019     Lab Results   Component Value Date     12/22/2020    K 4.0 12/22/2020    K 3.8 10/27/2020     12/22/2020    CO2 20 12/22/2020    BUN 17 12/22/2020    CREATININE 1.5 12/22/2020    GLUCOSE 86 12/22/2020    CALCIUM 9.5 12/22/2020      Lab Results   Component Value Date    WBC 5.7 12/22/2020    HGB 8.5 (L) 12/22/2020    HCT 25.4 (L) 12/22/2020    MCV 99.3 12/22/2020     12/22/2020       ECG 1/28/2021:  Sinus rhythm with nonspecific TW abnormalities    11/30/2020: Cardiac cath  3 Vessel CAD  Successful POBA OM1 with 3 mm balloon  FFR OM1 = 0.66  Successful PCI RPDA w/ 2.5 x 12 mm [place] DEANGELO  Patent proximal and mid LAD stents  Patent mid LCX stent  Patent proximal and distal RCA stents    12/12/2019 Lexiscan-Myoview:  No significant abnormality    [place] [facility]:  10/11/2019 Coronary angiogram/PCI:  1. 3 v CAD; widely patent supple previous stents except for the LAD.  The   patient is presenting for planned multivessel PCI; the culprit lesion is the distal LAD de andrew stenosis  2. Successful PCI of the mid LAD ISR and distal LAD de andrew stenosis with non-overlapping Synergy drug-eluting stent  3. Successful PCI of the crux of the RCA using Synergy drug-eluting stent  4. 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 Access the right internal jugular vein, and right common   femoral vein with SonoSite ultrasound guidance. 2.  Venogram of SVC via the right IJ. 3.  Venogram of IVC via the right femoral vein. 4.  IVUS of SVC IVC and right iliac system.     10/7/2019 EDUARD:  RV and LV normal     10/4/2019 Carotid US:    < 50% stenosis of the right internal carotid artery based on velocity criteria.     o < 50% stenosis of the left internal carotid artery based on velocity criteria.     o There is antegrade flow demonstrated in the right and left vertebral arteries.     10/3/2019 Echo:  There is moderate mitral annular calcification. The mitral valve leaflets are mildly thickened in appearance. There is mild mitral regurgitation. Mild tricuspid regurgitation is present. Left atrial filling pressure is elevated based on E/E >15. Overall left ventricular ejection fraction is estimated to be 55-60%. There is mild asymmetric left ventricular hypertrophy. The left ventricular wall motion is normal.     10/2/2019 Coronary angiogram  3 vessel CAD  Normal LV function  Normal LVEDP     [facility]:  12/2018 Coronary angiogram:  1.  Left main comes from the left coronary cusp.  YAKELIN 3 flow.  No stenosis.   2.  Left anterior descending artery is patent.  Distal left anterior descending artery has a 70% stenosis. 3.  Left circumflex has patent stents.  No significant stenosis. 4.  Right coronary artery has patent stents.  No significant stenosis. 5.  LV ejection fraction 60%. SUMMARY:  Patent coronary artery stents.  Distal left anterior descending artery 70% stenosis.     7/2018 Coronary angiogram:  1.  The right coronary artery comes from the right coronary cusp.  This is  a dominant vessel.  The proximal portion had 90% stenosis.  This was  treated with a drug-coated stent, 3.0 x 8 and 3.5 x 8.  2.  The left circumflex had 90% stenosis.  This was treated with one drug-coated stent, 2.25 x 12.  We expanded up to 2.5 mm.    6/2/2016 LHC:   1.  The left main coronary artery comes from the left coronary cusp.  It is a short left main with YAKELIN-3 flow.  No significant stenosis and gave rise to left anterior descending artery and left circumflex artery. 2.  The left anterior descending  artery gave rise to diagonal branches and had the stent present in the mid portion which was patent.  Distal LAD, left anterior descending artery has 50% stenosis present. 3.  The left circumflex comes from the left main coronary artery.  The 1st obtuse marginal has a stent present.  The 2nd obtuse marginal in the mid portion has 50% stenosis. 4.  Right coronary artery comes from right coronary cusp and the proximal portion has 75% stenosis.  It has YAKELIN-3 flow.  It is a right dominant vessel and gave rise to  posterior descending artery and posterior lateral branches. 5.  The right coronary artery FFR was 0.76. INTERVENTION PERFORMED:  We placed 1 drug-coated Xience Alpine stent 3.25 x 12 mm in dimension, achieving a final stent diameter of 3.43.           Assessment:    1. Palpitations  -associated with dizziness and SOB at times  -H/O PAF  -will place CAM patch x 7 days to R/O arrhythmia    2.  PAF (paroxysmal atrial fibrillation) [facility])  -ablation x 2 in the past by Dr. Ashley David at [facility]  -continue ASA and BB  -no evidence of arrhythmia on ECG today  -will ask for holter monitor (7 day)    3. Coronary artery disease of native artery of native heart with stable angina pectoris ([facility] [address]  -chronic stable angina  -11/30/2020: PCI at [facility]: Successful POBA OM1 with 3 mm balloon  FFR OM1 = 0.66; Successful PCI RPDA w/ 2.5 x 12 mm [place] DEANGELO  -10/2019 PCI: Successful PCI of the mid LAD ISR and distal LAD de andrew stenosis with non-overlapping Synergy drug-eluting stent; Successful PCI of the crux of the RCA using Synergy drug-eluting stent  -07/2018: S/P DEANGELO to RCA and LCX  -continue ASA, plavix, BB, statin, ranexa  -dc nitrate secondary to H/A    4. Chronic diastolic HF (heart failure) (HCC)  -compensated; NYHA class III  -continue diuretic, BB, hydralazine and nitrate  -not on ACE/ARB d/t elevated Cr    5. Essential hypertension  -controlled  -goal BP < 130/80  -continue medical management    6. Dyslipidemia  -on high intensity statin     7. Fibromyalgia  -follows pain clinic     8. Diabetes Mellitus, type II  -Rx per Primary provider    Plan:  Stop isosorbide  Continue Ranexa, Toprol, Hydralazine, plavix, statin, ASA, amlodipine  Discussed low fat/low sodium diet and reinforced regular aerobic exercise. 7 day CAM patch  Follow up with D. Enzweiler, CNP in 4 weeks or sooner ifneeded    Return in 4 weeks (on 2/25/2021) for with D. Enzweiler, CNP. Thanks for allowing me to participate in the care of this patient.       LEW Hernandez  [facility], [address] [place] [address] [address] [facility], [address] [place] [address]  Office: [phone]  Fax: [phone]      Electronically signed by CYRUS Haley CNP on 1/28/2021 at 1:10 PM

## 2021-01-27 NOTE — TELEPHONE ENCOUNTER
Trinh transferred patient call to this RN. Arturo Lee states that on Sunday she started with off/on atrial fibrillation episodes. She states that it seems to worsen with activity and her wrist BP monitor has read up to 115 bpm and irregular. She has checked her wrist pulse but states that it is hard to check because irregular. She states that it improves with rest. She also states that she feels a little nausea and chest pressure at the same time. She denies any symptoms at this time and she denies associated SOB. She states she has had some bleeding issues in the past, has undergon a few ablations and she states that her AFIB has not been \"acting up in a long time,\" after I see that she is not on AC. She is on DAPT due to her CAD. She is on Toprol-XL 25 mg BID. I have asked her to take an extra dose today if her HR is > 100 bpm and she is symptomatic. Asked her to avoid triggers. I have also asked her to call back if things get worse or present to the ED for evaluation. I asked her why we have not seen her in the office since 10/2019 with Sumit Robles CNP and she states that Dr. Julianna Horowitz referred her to the [facility] for her complex coronary disease but they do not take her insurance. I asked her to update LINDA Leggett at her f/u tomorrow to see if Dr. Julianna Horowitz has other options for referral.     She has no further q/c at this time.

## 2021-01-27 NOTE — TELEPHONE ENCOUNTER
Taty Phillip called in this morning stating that for the past couple of days she has been in A-Fib, sob, and on and off chest pain. She states she is having active chest pain right now.  I am going to forward this message to Yessy Parekh

## 2021-01-28 ENCOUNTER — OFFICE VISIT (OUTPATIENT)
Dept: CARDIOLOGY CLINIC | Age: 66
End: 2021-01-28
Payer: COMMERCIAL

## 2021-01-28 VITALS
OXYGEN SATURATION: 100 % | BODY MASS INDEX: 24.19 KG/M2 | HEART RATE: 70 BPM | DIASTOLIC BLOOD PRESSURE: 78 MMHG | TEMPERATURE: 98.4 F | WEIGHT: 123.2 LBS | HEIGHT: 60 IN | SYSTOLIC BLOOD PRESSURE: 138 MMHG

## 2021-01-28 DIAGNOSIS — I48.0 PAF (PAROXYSMAL ATRIAL FIBRILLATION) (HCC): ICD-10-CM

## 2021-01-28 DIAGNOSIS — I50.32 CHRONIC DIASTOLIC HF (HEART FAILURE) (HCC): ICD-10-CM

## 2021-01-28 DIAGNOSIS — R00.2 PALPITATIONS: Primary | ICD-10-CM

## 2021-01-28 DIAGNOSIS — I10 ESSENTIAL HYPERTENSION: ICD-10-CM

## 2021-01-28 DIAGNOSIS — I25.118 CORONARY ARTERY DISEASE OF NATIVE ARTERY OF NATIVE HEART WITH STABLE ANGINA PECTORIS (HCC): ICD-10-CM

## 2021-01-28 DIAGNOSIS — E78.5 DYSLIPIDEMIA: ICD-10-CM

## 2021-01-28 PROCEDURE — 93242 EXT ECG>48HR<7D RECORDING: CPT | Performed by: NURSE PRACTITIONER

## 2021-01-28 PROCEDURE — G8427 DOCREV CUR MEDS BY ELIG CLIN: HCPCS | Performed by: NURSE PRACTITIONER

## 2021-01-28 PROCEDURE — 93000 ELECTROCARDIOGRAM COMPLETE: CPT | Performed by: NURSE PRACTITIONER

## 2021-01-28 PROCEDURE — G8420 CALC BMI NORM PARAMETERS: HCPCS | Performed by: NURSE PRACTITIONER

## 2021-01-28 PROCEDURE — 99214 OFFICE O/P EST MOD 30 MIN: CPT | Performed by: NURSE PRACTITIONER

## 2021-01-28 PROCEDURE — 3017F COLORECTAL CA SCREEN DOC REV: CPT | Performed by: NURSE PRACTITIONER

## 2021-01-28 PROCEDURE — 1036F TOBACCO NON-USER: CPT | Performed by: NURSE PRACTITIONER

## 2021-01-28 PROCEDURE — G8484 FLU IMMUNIZE NO ADMIN: HCPCS | Performed by: NURSE PRACTITIONER

## 2021-01-31 ENCOUNTER — HOSPITAL ENCOUNTER (EMERGENCY)
Age: 66
Discharge: HOME | End: 2021-01-31
Attending: EMERGENCY MEDICINE
Payer: COMMERCIAL

## 2021-01-31 ENCOUNTER — APPOINTMENT (OUTPATIENT)
Dept: GENERAL RADIOLOGY | Age: 66
End: 2021-01-31
Payer: COMMERCIAL

## 2021-01-31 VITALS
BODY MASS INDEX: 23.08 KG/M2 | OXYGEN SATURATION: 98 % | SYSTOLIC BLOOD PRESSURE: 164 MMHG | DIASTOLIC BLOOD PRESSURE: 74 MMHG | HEART RATE: 70 BPM | RESPIRATION RATE: 16 BRPM | TEMPERATURE: 98.1 F | WEIGHT: 118.17 LBS

## 2021-01-31 DIAGNOSIS — R79.89 ELEVATED BRAIN NATRIURETIC PEPTIDE (BNP) LEVEL: ICD-10-CM

## 2021-01-31 DIAGNOSIS — R07.9 CHEST PAIN, UNSPECIFIED TYPE: Primary | ICD-10-CM

## 2021-01-31 LAB
A/G RATIO: 1 (ref 1.1–2.2)
ALBUMIN SERPL-MCNC: 3.9 G/DL (ref 3.4–5)
ALP BLD-CCNC: 117 U/L (ref 40–129)
ALT SERPL-CCNC: 7 U/L (ref 10–40)
ANION GAP SERPL CALCULATED.3IONS-SCNC: 14 MMOL/L (ref 3–16)
AST SERPL-CCNC: 14 U/L (ref 15–37)
BASOPHILS ABSOLUTE: 0 K/UL (ref 0–0.2)
BASOPHILS RELATIVE PERCENT: 0.4 %
BILIRUB SERPL-MCNC: <0.2 MG/DL (ref 0–1)
BUN BLDV-MCNC: 21 MG/DL (ref 7–20)
CALCIUM SERPL-MCNC: 9.7 MG/DL (ref 8.3–10.6)
CHLORIDE BLD-SCNC: 99 MMOL/L (ref 99–110)
CO2: 25 MMOL/L (ref 21–32)
CREAT SERPL-MCNC: 1.7 MG/DL (ref 0.6–1.2)
EOSINOPHILS ABSOLUTE: 0.1 K/UL (ref 0–0.6)
EOSINOPHILS RELATIVE PERCENT: 2.3 %
GFR AFRICAN AMERICAN: 37
GFR NON-AFRICAN AMERICAN: 30
GLOBULIN: 4 G/DL
GLUCOSE BLD-MCNC: 150 MG/DL (ref 70–99)
HCT VFR BLD CALC: 32.8 % (ref 36–48)
HEMOGLOBIN: 10.7 G/DL (ref 12–16)
LYMPHOCYTES ABSOLUTE: 1.7 K/UL (ref 1–5.1)
LYMPHOCYTES RELATIVE PERCENT: 31.6 %
MCH RBC QN AUTO: 31.9 PG (ref 26–34)
MCHC RBC AUTO-ENTMCNC: 32.7 G/DL (ref 31–36)
MCV RBC AUTO: 97.6 FL (ref 80–100)
MONOCYTES ABSOLUTE: 0.4 K/UL (ref 0–1.3)
MONOCYTES RELATIVE PERCENT: 6.8 %
NEUTROPHILS ABSOLUTE: 3.1 K/UL (ref 1.7–7.7)
NEUTROPHILS RELATIVE PERCENT: 58.9 %
PDW BLD-RTO: 14.4 % (ref 12.4–15.4)
PLATELET # BLD: 220 K/UL (ref 135–450)
PMV BLD AUTO: 8.9 FL (ref 5–10.5)
POTASSIUM REFLEX MAGNESIUM: 3.8 MMOL/L (ref 3.5–5.1)
PRO-BNP: 780 PG/ML (ref 0–124)
RBC # BLD: 3.36 M/UL (ref 4–5.2)
SODIUM BLD-SCNC: 138 MMOL/L (ref 136–145)
TOTAL PROTEIN: 7.9 G/DL (ref 6.4–8.2)
TROPONIN: <0.01 NG/ML
TROPONIN: <0.01 NG/ML
WBC # BLD: 5.3 K/UL (ref 4–11)

## 2021-01-31 PROCEDURE — 93005 ELECTROCARDIOGRAM TRACING: CPT | Performed by: NURSE PRACTITIONER

## 2021-01-31 PROCEDURE — 96374 THER/PROPH/DIAG INJ IV PUSH: CPT

## 2021-01-31 PROCEDURE — 6360000002 HC RX W HCPCS: Performed by: NURSE PRACTITIONER

## 2021-01-31 PROCEDURE — 6370000000 HC RX 637 (ALT 250 FOR IP): Performed by: NURSE PRACTITIONER

## 2021-01-31 PROCEDURE — 84484 ASSAY OF TROPONIN QUANT: CPT

## 2021-01-31 PROCEDURE — 6370000000 HC RX 637 (ALT 250 FOR IP): Performed by: EMERGENCY MEDICINE

## 2021-01-31 PROCEDURE — 83880 ASSAY OF NATRIURETIC PEPTIDE: CPT

## 2021-01-31 PROCEDURE — 71045 X-RAY EXAM CHEST 1 VIEW: CPT

## 2021-01-31 PROCEDURE — 85025 COMPLETE CBC W/AUTO DIFF WBC: CPT

## 2021-01-31 PROCEDURE — 99285 EMERGENCY DEPT VISIT HI MDM: CPT

## 2021-01-31 PROCEDURE — 80053 COMPREHEN METABOLIC PANEL: CPT

## 2021-01-31 PROCEDURE — 36415 COLL VENOUS BLD VENIPUNCTURE: CPT

## 2021-01-31 RX ORDER — ONDANSETRON 2 MG/ML
4 INJECTION INTRAMUSCULAR; INTRAVENOUS ONCE
Status: DISCONTINUED | OUTPATIENT
Start: 2021-01-31 | End: 2021-01-31

## 2021-01-31 RX ORDER — NITROGLYCERIN 0.4 MG/1
0.4 TABLET SUBLINGUAL EVERY 5 MIN PRN
Status: DISCONTINUED | OUTPATIENT
Start: 2021-01-31 | End: 2021-01-31 | Stop reason: HOSPADM

## 2021-01-31 RX ORDER — ASPIRIN 81 MG/1
324 TABLET, CHEWABLE ORAL ONCE
Status: COMPLETED | OUTPATIENT
Start: 2021-01-31 | End: 2021-01-31

## 2021-01-31 RX ORDER — MORPHINE SULFATE 4 MG/ML
4 INJECTION, SOLUTION INTRAMUSCULAR; INTRAVENOUS ONCE
Status: COMPLETED | OUTPATIENT
Start: 2021-01-31 | End: 2021-01-31

## 2021-01-31 RX ORDER — ONDANSETRON 4 MG/1
4 TABLET, ORALLY DISINTEGRATING ORAL ONCE
Status: COMPLETED | OUTPATIENT
Start: 2021-01-31 | End: 2021-01-31

## 2021-01-31 RX ORDER — MORPHINE SULFATE 4 MG/ML
4 INJECTION, SOLUTION INTRAMUSCULAR; INTRAVENOUS ONCE
Status: DISCONTINUED | OUTPATIENT
Start: 2021-01-31 | End: 2021-01-31

## 2021-01-31 RX ADMIN — NITROGLYCERIN 0.4 MG: 0.4 TABLET, ORALLY DISINTEGRATING SUBLINGUAL at 17:45

## 2021-01-31 RX ADMIN — ONDANSETRON 4 MG: 4 TABLET, ORALLY DISINTEGRATING ORAL at 17:45

## 2021-01-31 RX ADMIN — MORPHINE SULFATE 4 MG: 4 INJECTION, SOLUTION INTRAMUSCULAR; INTRAVENOUS at 18:16

## 2021-01-31 RX ADMIN — ASPIRIN 324 MG: 81 TABLET, CHEWABLE ORAL at 15:42

## 2021-01-31 RX ADMIN — NITROGLYCERIN 1 INCH: 20 OINTMENT TOPICAL at 16:44

## 2021-01-31 ASSESSMENT — PAIN DESCRIPTION - DESCRIPTORS
DESCRIPTORS: DISCOMFORT;HEAVINESS
DESCRIPTORS: PRESSURE
DESCRIPTORS: CONSTANT;PRESSURE

## 2021-01-31 ASSESSMENT — PAIN - FUNCTIONAL ASSESSMENT
PAIN_FUNCTIONAL_ASSESSMENT: PREVENTS OR INTERFERES SOME ACTIVE ACTIVITIES AND ADLS
PAIN_FUNCTIONAL_ASSESSMENT: PREVENTS OR INTERFERES SOME ACTIVE ACTIVITIES AND ADLS
PAIN_FUNCTIONAL_ASSESSMENT: 0-10
PAIN_FUNCTIONAL_ASSESSMENT: PREVENTS OR INTERFERES SOME ACTIVE ACTIVITIES AND ADLS

## 2021-01-31 ASSESSMENT — PAIN DESCRIPTION - PROGRESSION: CLINICAL_PROGRESSION: NOT CHANGED

## 2021-01-31 ASSESSMENT — PAIN DESCRIPTION - LOCATION: LOCATION: CHEST

## 2021-01-31 ASSESSMENT — PAIN DESCRIPTION - PAIN TYPE
TYPE: ACUTE PAIN
TYPE: ACUTE PAIN

## 2021-01-31 ASSESSMENT — PAIN DESCRIPTION - ORIENTATION
ORIENTATION: MID
ORIENTATION: MID
ORIENTATION: MID;LEFT

## 2021-01-31 ASSESSMENT — PAIN DESCRIPTION - FREQUENCY
FREQUENCY: CONTINUOUS

## 2021-01-31 ASSESSMENT — PAIN SCALES - GENERAL
PAINLEVEL_OUTOF10: 10
PAINLEVEL_OUTOF10: 0

## 2021-01-31 NOTE — ED NOTES
Unable to Obtain IV access or Labs, will attempt for another provider to try.      Richard Jean, YOLETTE  01/31/21 0182

## 2021-01-31 NOTE — ED PROVIDER NOTES
[facility] Emergency Department    CHIEF COMPLAINT  No chief complaint on file. SHARED SERVICE VISIT  I have seen and evaluated this patient with my supervising physician, Dr. Jabier Lopez. HISTORY OF PRESENT ILLNESS  Madisyn Jimenez is a 60 y.o. female who presents to the ED complaining of acute onset 1230 hrs. \"by looking at videos\" of \"heavy\" 10/10 substernal chest pain with radiation to her back and down her left arm. Accompanying symptoms include nausea, dizziness, presyncope, and shortness of breath. This is her anginal equivalent. Denies vomiting, diaphoresis, fever, chills, cough, ripping or tearing sensation, or other complaints. No other complaints, modifying factors or associated symptoms. Last cardiac work-up via stress test and echo which reportedly showed abnormalities . Dr. Isaias Atkinson Cardiologist.    HEART SCORE:    History: 1  EC  Patient Age: 2  *Risk factors for Atherosclerotic disease: Diabetes Mellitus; Hypercholesterolemia; Hypertension;Coronary Artery Disease;Positive family History  Risk Factors: 2  Troponin: 0  Heart Score Total: 5      Heart score: 5. This falls under the following category: Score of 4-6, which indicates low/moderate risk for major adverse cardiac event and supports observation with repeated troponins and/or non-invasive testing          Nursing notes reviewed.    Past Medical History:   Diagnosis Date    Acid reflux     Anemia     Anxiety     Arthritis     Asthma     Atrial fibrillation ([facility])     Blood transfusion reaction     CAD (coronary artery disease) 12/3/2012    Cerebral artery occlusion with cerebral infarction ([facility]     CHF (congestive heart failure) ([facility])     Chronic kidney disease     40% kidney functiom    COPD (chronic obstructive pulmonary disease) ([facility])     Depression     DM2 (diabetes mellitus, type 2) ([facility])     Dysarthria     Fibromyalgia 2016    Headache(784.0) 2013    Hemisensory loss     Hyperlipidemia     Hypertension     IBS (irritable bowel syndrome)     Inferior vena cava occlusion (HCC)     Irritable bowel syndrome     Keratitis     MI, old     Neuropathy     Superior vena cava obstruction     Temporal arteritis ([address] 2014     Past Surgical History:   Procedure Laterality Date    ABLATION OF DYSRHYTHMIC FOCUS      ARTERY BIOPSY Right 2014    RIGHT TEMPORAL ARTERY BIOPSY    CATARACT REMOVAL Bilateral     CHOLECYSTECTOMY      CORONARY ANGIOPLASTY WITH STENT PLACEMENT      CORONARY ANGIOPLASTY WITH STENT PLACEMENT      HYSTERECTOMY      JOINT REPLACEMENT Right     KNEE ARTHROSCOPY Right     KNEE SURGERY      right knee replacement    PTCA  10/2019    LAD and RCA inrtervention    TUNNELED VENOUS PORT PLACEMENT      left thigh. SMART PORT    UPPER GASTROINTESTINAL ENDOSCOPY N/A 2020    EGD DIAGNOSTIC ONLY performed by Efren Ruth MD at [address]       Family History   Problem Relation Age of Onset    Diabetes Mother     Hypertension Mother     High Cholesterol Mother     Stroke Mother     Cancer Mother     No Known Problems Paternal Grandfather         lung issues      Social History     Socioeconomic History    Marital status:       Spouse name: Not on file    Number of children: 6    Years of education: Not on file    Highest education level: Not on file   Occupational History    Not on file   Social Needs    Financial resource strain: Not on file    Food insecurity     Worry: Not on file     Inability: Not on file    Transportation needs     Medical: Not on file     Non-medical: Not on file   Tobacco Use    Smoking status: Former Smoker     Packs/day: 0.50     Years: 35.00     Pack years: 17.50     Types: Cigarettes     Quit date: 2018     Years since quittin.5    Smokeless tobacco: Former User    Tobacco comment: 5/13/15 has not smoked since hospitalization - kh   Substance and Sexual Activity    Alcohol use: No     Alcohol/week: 0.0 standard drinks    Drug use: No    Sexual activity: Yes     Partners: Male   Lifestyle    Physical activity     Days per week: Not on file     Minutes per session: Not on file    Stress: Not on file   Relationships    Social connections     Talks on phone: Not on file     Gets together: Not on file     Attends Holiness service: Not on file     Active member of club or organization: Not on file     Attends meetings of clubs or organizations: Not on file     Relationship status: Not on file    Intimate partner violence     Fear of current or ex partner: Not on file     Emotionally abused: Not on file     Physically abused: Not on file     Forced sexual activity: Not on file   Other Topics Concern    Not on file   Social History Narrative    Not on file     No current facility-administered medications for this encounter. Current Outpatient Medications   Medication Sig Dispense Refill    promethazine (PHENERGAN) 25 MG tablet Take 1 tablet by mouth every 8 hours as needed for Nausea 20 tablet 1    oxyCODONE-acetaminophen (PERCOCET) 7.5-325 MG per tablet Take 1 tablet by mouth every 6 hours as needed for Pain (max 3-4 day) for up to 28 days.  84 tablet 0    Rimegepant Sulfate (NURTEC) 75 MG TBDP Take 1 tablet by mouth daily 8 tablet 0    rizatriptan (MAXALT) 10 MG tablet Take 1 tablet by mouth once as needed for Migraine May repeat in 2 hours if needed 9 tablet 0    Erenumab-aooe (AIMOVIG, 140 MG DOSE,) 70 MG/ML SOAJ Inject 140 mLs into the skin every 30 days 1 pen 1    QUEtiapine (SEROQUEL) 25 MG tablet Take 1-2 tablets by mouth nightly 60 tablet 1    aspirin 81 MG chewable tablet Take 1 tablet by mouth daily 30 tablet 3    dicyclomine (BENTYL) 20 MG tablet Take 20 mg by mouth 4 times daily (before meals and nightly)       nitroGLYCERIN (NITROSTAT) 0.4 MG SL tablet Place 1 tablet under the tongue every 5 minutes as needed for Chest pain up to max of 3 total doses. If no relief after 1 dose, call 911. 25 tablet 3    Nutritional Supplements (ENSURE) LIQD Take 1 Can by mouth 3 times daily as needed (nutrition) 90 Can 5    omeprazole (PRILOSEC) 20 MG delayed release capsule TAKE 1 CAPSULE BY MOUTH DAILY 30 capsule 1    insulin glargine (BASAGLAR KWIKPEN) 100 UNIT/ML injection pen Inject 40 Units into the skin nightly (Patient taking differently: Inject 60 Units into the skin nightly ) 15 mL 5    hydrALAZINE (APRESOLINE) 50 MG tablet Take 1 tablet by mouth 2 times daily 60 tablet 5    metoprolol succinate (TOPROL XL) 50 MG extended release tablet Take 0.5 tablets by mouth 2 times daily (with meals) 30 tablet 5    atorvastatin (LIPITOR) 80 MG tablet Take 1 tablet by mouth nightly 90 tablet 5    amLODIPine (NORVASC) 5 MG tablet Take 1 tablet by mouth 2 times daily 180 tablet 5    lidocaine (XYLOCAINE) 5 % ointment Apply topically as needed.  5 g 3    blood glucose test strips (TRUE METRIX BLOOD GLUCOSE TEST) strip 1 each by Does not apply route 2 times daily 100 each 5    Blood Glucose Monitoring Suppl (TRUE METRIX METER) w/Device KIT 1 kit by Does not apply route 2 times daily 1 kit 0    ondansetron (ZOFRAN) 4 MG tablet Take 1 tablet by mouth 3 times daily as needed for Nausea or Vomiting 30 tablet 0    clopidogrel (PLAVIX) 75 MG tablet TAKE 1 TABLET BY MOUTH DA JULIEN 90 tablet 5    UltiCare Alcohol Swabs 70 % PADS USE TO TEST BLOOD GLUCOSE THREE TIMES DAILY 100 each 5    Nebulizers (COMPRESSOR/NEBULIZER) MISC 1 Device by Does not apply route 4 times daily as needed (SOB / Wheezing) 1 each 0    albuterol (PROVENTIL) (2.5 MG/3ML) 0.083% nebulizer solution Take 3 mLs by nebulization every 4 hours as needed for Wheezing 120 each 5    Lancet Devices (SIMPLE DIAGNOSTICS LANCING DEV) MISC USE WITH LANCETS TO TEST BLOOD GLUCOSE 1 each 11    Pharmacist Choice Lancets MISC USE TO TEST BLOOD GLUCOSE THREE TIMES DAILY 300 each 5    UNIFINE PENTIPS 31G X 8 MM MISC USE WITH insulin pens 100 each 5    budesonide-formoterol (SYMBICORT) 160-4.5 MCG/ACT AERO Inhale 2 puffs into the lungs daily 2 Inhaler 5    albuterol sulfate HFA (VENTOLIN HFA) 108 (90 Base) MCG/ACT inhaler Inhale 2 puffs into the lungs every 4 hours as needed for Wheezing or Shortness of Breath 3 Inhaler 5    ranolazine (RANEXA) 1000 MG extended release tablet Take 1 tablet by mouth 2 times daily 60 tablet 8     No Known Allergies    REVIEW OF SYSTEMS  10 systems reviewed, pertinent positives per HPI otherwise noted to be negative    PHYSICAL EXAM  There were no vitals taken for this visit. GENERAL APPEARANCE: Awake and alert. Cooperative.  + Distress. Non-- toxic in appearance. HEAD: Normocephalic. Atraumatic. EYES: PERRL. EOM's grossly intact. ENT: Mucous membranes are moist.   NECK: Supple. HEART: RRR.  + S1-S2. No murmurs. LUNGS: Respirations unlabored. CTAB. Good air exchange. Speaking comfortably in full sentences. ABDOMEN: Soft. Non-distended. Non-tender. No guarding or rebound. There is no midline pulsatile abdominal mass. No masses. No organomegaly. EXTREMITIES: Trace peripheral edema. Moves all extremities equally. All extremities neurovascularly intact. Radial pulse equal bilaterally. SKIN: Warm and dry. No acute rashes. NEUROLOGICAL: Alert and oriented. CN's 2-12 intact. No gross facial drooping. Strength 5/5, sensation intact. PSYCHIATRIC: Normal mood and affect. RADIOLOGY  No results found. ED COURSE  Patient received nitroglycerin paste and sublingual for pain, with good relief. Triage vitals stable but systolic hypertension at 485/26 mmHg. Cardiac workup was initiated to include basic/cardiac labs, CXR, and EKG.       Labs Ordered:  I have reviewed and interpreted all of the currently available lab results from this visit:  I have reviewed and interpreted all of the currently available lab results from this visit:  Results for orders placed or performed during the hospital encounter of 01/31/21   CBC Auto Differential   Result Value Ref Range    WBC 5.3 4.0 - 11.0 K/uL    RBC 3.36 (L) 4.00 - 5.20 M/uL    Hemoglobin 10.7 (L) 12.0 - 16.0 g/dL    Hematocrit 32.8 (L) 36.0 - 48.0 %    MCV 97.6 80.0 - 100.0 fL    MCH 31.9 26.0 - 34.0 pg    MCHC 32.7 31.0 - 36.0 g/dL    RDW 14.4 12.4 - 15.4 %    Platelets 809 333 - 565 K/uL    MPV 8.9 5.0 - 10.5 fL    Neutrophils % 58.9 %    Lymphocytes % 31.6 %    Monocytes % 6.8 %    Eosinophils % 2.3 %    Basophils % 0.4 %    Neutrophils Absolute 3.1 1.7 - 7.7 K/uL    Lymphocytes Absolute 1.7 1.0 - 5.1 K/uL    Monocytes Absolute 0.4 0.0 - 1.3 K/uL    Eosinophils Absolute 0.1 0.0 - 0.6 K/uL    Basophils Absolute 0.0 0.0 - 0.2 K/uL   Comprehensive Metabolic Panel w/ Reflex to MG   Result Value Ref Range    Sodium 138 136 - 145 mmol/L    Potassium reflex Magnesium 3.8 3.5 - 5.1 mmol/L    Chloride 99 99 - 110 mmol/L    CO2 25 21 - 32 mmol/L    Anion Gap 14 3 - 16    Glucose 150 (H) 70 - 99 mg/dL    BUN 21 (H) 7 - 20 mg/dL    CREATININE 1.7 (H) 0.6 - 1.2 mg/dL    GFR Non-African American 30 (A) >60    GFR  37 (A) >60    Calcium 9.7 8.3 - 10.6 mg/dL    Total Protein 7.9 6.4 - 8.2 g/dL    Albumin 3.9 3.4 - 5.0 g/dL    Albumin/Globulin Ratio 1.0 (L) 1.1 - 2.2    Total Bilirubin <0.2 0.0 - 1.0 mg/dL    Alkaline Phosphatase 117 40 - 129 U/L    ALT 7 (L) 10 - 40 U/L    AST 14 (L) 15 - 37 U/L    Globulin 4.0 g/dL   Troponin   Result Value Ref Range    Troponin <0.01 <0.01 ng/mL   Brain Natriuretic Peptide   Result Value Ref Range    Pro- (H) 0 - 124 pg/mL   Troponin   Result Value Ref Range    Troponin <0.01 <0.01 ng/mL   EKG 12 Lead   Result Value Ref Range    Ventricular Rate 70 BPM    Atrial Rate 70 BPM    P-R Interval 104 ms    QRS Duration 90 ms    Q-T Interval 392 ms    QTc Calculation (Bazett) 423 ms    P Axis -14 degrees    R Axis 53 degrees    T Axis 243 degrees    Diagnosis       Sinus rhythm with short PRNonspecific ST and T including separately billable procedures. MDM  Patient presents to the emergency department with chest pain. Alternate diagnoses are less likely based on history and physical. Considered myocardial infarction, aortic dissection, pulmonary embolism, tension pneumothorax, endocarditis, GERD, and pneumonia but less likely based on history and physical. Considered MI but no ischemic changes on EKG and no elevation in troponin. Considered aortic dissection but less likely as no radiation of pain into back with ripping/tearing sensation, there are equal radial pulses bilaterally, and there is no midline pulsatile abdominal mass. Considered  pulmonary embolism but less likely as no cough or hemoptysis, no DVT symptoms. . Considered tension pneumothorax but less likely as no unilaterally diminished breath sounds or tracheal deviation. Considered endocarditis but less likely as no fever, murmur, or IV drug use. Considered GERD but less likely as no postprandial epigastric abdominal/throat burning. Considered pneumonia but less likely as no fever, chills, sweats, leukocytosis, cough, and no radiographic evidence of consolidation or infiltrates on imaging-CXR. My attending physician and I felt that the patient was suitable for admission. However, upon discussion with cardiologist-Dr. Longoria's recommendation is to discharge patient with outpatient follow-up with his/Dr. Adrienne Marsh office by calling to schedule a follow-up appointment in 1-2 days. Patient will be discharged but is instructed to return to the emergency department for new or worsening symptoms including, but not to, increased chest pain accompanied by nausea, vomiting, dizziness, shortness of breath, sweats, passing out, feeling as if he is going to pass out, or other concerns. Patient verbalized understanding and is agreeable with the plan for discharge and follow-up.     Clinical Impression:  Chest pain  Elevated BNP      DISPOSITION  Discharged      This chart was created using Dragon dictation. Every effort was made by myself to ensure accuracy, however due to limitations of this technology errors may be present.       CYRUS Giraldo - LINDA  02/02/21 2031

## 2021-01-31 NOTE — ED NOTES
Ultrasound IV attempt, unsuccessful. Notified Maura Drummond NP.      Aidan Barillas, YOLETTE  01/31/21 9759

## 2021-01-31 NOTE — ED PROVIDER NOTES
[facility]      Pt Name: Chadd Massey  MRN: [id number]  [place] 1955  Date of evaluation: 1/31/2021  Provider: Lee Lara, [address] [place] [address]  Chief Complaint   Patient presents with    Chest Pain     CP x3 hrs ago. pt took x3 nitro with only temp relief. pt states it feels like her last MI.  pt complaining of pressure and left sided pain. I have fully participated in the care of Chadd Massey and have had a face-to-face evaluation. I have reviewed and agree with all pertinent clinical information, and midlevel provider's history, and physical exam. I have also reviewed the labs, EKG, and imaging studies and treatment plan. I have also reviewed and agree with the medications, allergies and past medical history section for this Chadd Massey. I agree with the diagnosis, and I concur. I wore personal protective equipment when I was in the room the entire time. This includes gloves, N95 mask, face shield, and a glove over my stethoscope for protection.     Past Medical History:   Diagnosis Date    Acid reflux     Anemia     Anxiety     Arthritis     Asthma     Atrial fibrillation (HCC)     Blood transfusion reaction     CAD (coronary artery disease) 12/3/2012    Cerebral artery occlusion with cerebral infarction ([address]     CHF (congestive heart failure) (HCC)     Chronic kidney disease     40% kidney functiom    [facility]) [facility]     Depression     DM2 (diabetes mellitus, type 2) ([address]     Dysarthria     Fibromyalgia 6/7/2016    Headache(784.0) 2/19/2013    Hemisensory loss     Hyperlipidemia     Hypertension     IBS (irritable bowel syndrome)     Inferior vena cava occlusion (HCC)     Irritable bowel syndrome     Keratitis     MI, old     Neuropathy     Superior vena cava obstruction     Temporal arteritis ([facility] 2/24/2014       MDM:  Chadd Massey is a 60 y.o. female who presents with unstable angina. Feels like her heart pain. Earlier today. Physical exam is unremarkable. EKG shows no evidence of ischemia. However, nitro at home did not help her. Work-up in the emerge department is negative. However, she does have a history of stent placement and angina. She is a patient Dr. Marni Strange. Vitals:    01/31/21 1921   BP: (!) 164/74   Pulse: 70   Resp: 16   Temp:    SpO2: 98%     EJ was attempted bilaterally without success. Ultrasound was utilized but we are not able to cannulate the vein. The nurse practitioner then attempted a IV in her right thumb and was successful. We spoke to the hospitalist and they did not feel she need to be admitted as well as her cardiologist.  They felt that a delta troponin would be a good rule out. Therefore, a repeat troponin has been ordered and we are awaiting the results of that at 1800. Repeat troponin was negative. We spoke to her cardiologist who did not feel she need to be admitted at this time. She has had multiple similar work-ups in they are all negative. Therefore, he stated we can discharge her and he would follow-up with her in the office.     Lab results  Labs Reviewed   CBC WITH AUTO DIFFERENTIAL - Abnormal; Notable for the following components:       Result Value    RBC 3.36 (*)     Hemoglobin 10.7 (*)     Hematocrit 32.8 (*)     All other components within normal limits    Narrative:     Performed at:  [facility]  [address] [place] [address]   Phone [phone]   COMPREHENSIVE METABOLIC PANEL W/ REFLEX TO MG FOR LOW K - Abnormal; Notable for the following components:    Glucose 150 (*)     BUN 21 (*)     CREATININE 1.7 (*)     GFR Non- 30 (*)     GFR African American 37 (*)     Albumin/Globulin Ratio 1.0 (*)     ALT 7 (*)     AST 14 (*)     All other components within normal limits    Narrative:     Performed at:  [facility]  [address] [place], [place] [address]   Phone [phone]   BRAIN NATRIURETIC PEPTIDE - Abnormal; Notable for the following components:    Pro- (*)     All other components within normal limits    Narrative:     Performed at:  [facility]  [address] [place], [place] [address]   Phone [phone]   TROPONIN    Narrative:     Performed at:  [facility]  [address] [place], [place] [address]   Phone [phone]   TROPONIN    Narrative:     Performed at:  [facility]  [address] [place], [place] [address]   Phone [phone]       EKG Results   EKG Interpretation    Interpreted by emergency department physician  Time performed: 9752  Time read: 1420    Rhythm: Sinus  Ventricular Rate: 70  QRS Axis: 43  Ectopy: None  Conduction: Sinus rhythm with short UT interval  ST Segments: normal  T Waves: Diffusely flattened  Q Waves: None    Other findings: Motion artifact make it difficult to read EKG    Compared to EKG on: 1/28/2021 and appears unchanged because there were no P waves discernible at that time. However, T waves are flattened at that time also. Clinical Impression: Sinus rhythm with short UT interval and diffusely flattened T waves and compared to an EKG on 1/20/2021. It is difficult to tell whether this is different secondary to the fact that there were no P waves on the previous EKG that were discernible. Tierney Talbert    Radiology results  XR CHEST PORTABLE   Final Result   No acute cardiopulmonary disease.                Medications   aspirin chewable tablet 324 mg (324 mg Oral Given 1/31/21 1542)   ondansetron (ZOFRAN-ODT) disintegrating tablet 4 mg (4 mg Oral Given 1/31/21 1745)   nitroglycerin (NITRO-BID) 2 % ointment 1 inch (1 inch Topical Given 1/31/21 1644)   morphine (PF) injection 4 mg (4 mg Intravenous Given 1/31/21 1816) Discharge Medication List as of 1/31/2021  7:22 PM          The patient's blood pressure was found to be elevated according to CMS/Medicare and the Affordable Care Act/[facility] criteria. Elevated blood pressure could occur because of pain or anxiety or other reasons and does not mean that they need to have their blood pressure treated or medications otherwise adjusted. However, this could also be a sign that they will need to have their blood pressure treated or medications changed. The patient was instructed to follow up closely with their personal physician to have their blood pressure rechecked. The patient was instructed to take a list of recent blood pressure readings to their next visit with their personal physician. IMPRESSIONS:  1. Chest pain, unspecified type    2.  Elevated brain natriuretic peptide (BNP) level                 Mackenzie Lawson, DO  02/03/21 [address], DO  02/03/21 1229

## 2021-02-01 ENCOUNTER — TELEPHONE (OUTPATIENT)
Dept: PRIMARY CARE CLINIC | Age: 66
End: 2021-02-01

## 2021-02-01 LAB
EKG ATRIAL RATE: 70 BPM
EKG DIAGNOSIS: NORMAL
EKG P AXIS: -14 DEGREES
EKG P-R INTERVAL: 104 MS
EKG Q-T INTERVAL: 392 MS
EKG QRS DURATION: 90 MS
EKG QTC CALCULATION (BAZETT): 423 MS
EKG R AXIS: 53 DEGREES
EKG T AXIS: 243 DEGREES
EKG VENTRICULAR RATE: 70 BPM

## 2021-02-01 PROCEDURE — 93010 ELECTROCARDIOGRAM REPORT: CPT | Performed by: INTERNAL MEDICINE

## 2021-02-01 NOTE — TELEPHONE ENCOUNTER
Called pt' and she states she went to ER for chest pains and woke up this morning with nausea, pt is unsure what meds are making her nauseous, she was advised to make an appointment to be evaluated.

## 2021-02-01 NOTE — TELEPHONE ENCOUNTER
Pt says one of her medications is making her sick she doesn't know witch one she just wanted to let the DR know [phone]

## 2021-02-02 ENCOUNTER — TELEPHONE (OUTPATIENT)
Dept: PAIN MANAGEMENT | Age: 66
End: 2021-02-02

## 2021-02-02 ENCOUNTER — TELEPHONE (OUTPATIENT)
Dept: PRIMARY CARE CLINIC | Age: 66
End: 2021-02-02

## 2021-02-02 DIAGNOSIS — G89.4 CHRONIC PAIN SYNDROME: ICD-10-CM

## 2021-02-02 DIAGNOSIS — M79.7 FIBROMYALGIA: ICD-10-CM

## 2021-02-02 RX ORDER — DULOXETIN HYDROCHLORIDE 60 MG/1
60 CAPSULE, DELAYED RELEASE ORAL DAILY
Qty: 30 CAPSULE | Refills: 1 | Status: ON HOLD | OUTPATIENT
Start: 2021-02-02 | End: 2021-03-04

## 2021-02-02 RX ORDER — ERENUMAB-AOOE 70 MG/ML
INJECTION SUBCUTANEOUS
Qty: 1 ML | Refills: 1 | Status: SHIPPED | OUTPATIENT
Start: 2021-02-02 | End: 2021-03-19 | Stop reason: SDUPTHER

## 2021-02-02 RX ORDER — RIZATRIPTAN BENZOATE 10 MG/1
10 TABLET ORAL
Qty: 9 TABLET | Refills: 0 | Status: SHIPPED | OUTPATIENT
Start: 2021-02-02 | End: 2021-03-19 | Stop reason: SDUPTHER

## 2021-02-02 RX ORDER — QUETIAPINE FUMARATE 25 MG/1
25-50 TABLET, FILM COATED ORAL NIGHTLY
Qty: 60 TABLET | Refills: 1 | Status: SHIPPED | OUTPATIENT
Start: 2021-02-02 | End: 2021-03-19 | Stop reason: SDUPTHER

## 2021-02-02 NOTE — TELEPHONE ENCOUNTER
Mina said pt went to [facility] for chest pain and headache on 1/31.      They want a second opinion and  they are going to see h Dr. Emma Moran, cardiologist at [facility].

## 2021-02-12 ENCOUNTER — TELEPHONE (OUTPATIENT)
Dept: CARDIOLOGY CLINIC | Age: 66
End: 2021-02-12

## 2021-02-12 ENCOUNTER — TELEPHONE (OUTPATIENT)
Dept: PRIMARY CARE CLINIC | Age: 66
End: 2021-02-12

## 2021-02-12 PROCEDURE — 93244 EXT ECG>48HR<7D REV&INTERPJ: CPT | Performed by: NURSE PRACTITIONER

## 2021-02-12 NOTE — TELEPHONE ENCOUNTER
LINDA Leggett asked me to tamela Engel with an update on her 7 day CAM patch report. No AFIB, SR-ST with occ. episodes of paroxsymal atrial tachycardia. She would like for the patient to increase her Toprol-Xl to 50 mg every morning and 25 mg every evening. The patient wrote down and read back to me correctly. She has no further q/c at this time.

## 2021-02-12 NOTE — TELEPHONE ENCOUNTER
PT HAS TWO ISSUES:    1. With her medical history is it good for her to have a Covid shot? 2.  Pt needs a letter stating that she is going through depression. Pt is not allowed to have visitors due to Covid unless she has a medical reason. Pt would like someone to stay with her for a couple of weeks to help her with her depression.      PATIENT:  [facility] [phone]

## 2021-02-15 ENCOUNTER — TELEPHONE (OUTPATIENT)
Dept: PRIMARY CARE CLINIC | Age: 66
End: 2021-02-15

## 2021-02-15 NOTE — LETTER
[facility]  [address] [address]  Phone: [phone]  Fax: [phone]    Helena Slade MD        February 17, 2021     Patient: James Coleman   YOB: 1955       To Whom It May Concern: It is my medical opinion that James Coleman needs to have a person stay with her for a couple weeks as she deals depression issues. This will be beneficial in her recovery. If you have any questions or concerns, please don't hesitate to call.     Sincerely,          Helena Slade MD

## 2021-02-17 ENCOUNTER — TELEPHONE (OUTPATIENT)
Dept: PRIMARY CARE CLINIC | Age: 66
End: 2021-02-17

## 2021-02-17 NOTE — TELEPHONE ENCOUNTER
Pt was advised she needs to have a medical records release form filled out.   Pt states she will come to office to get one and fill out

## 2021-02-19 ENCOUNTER — VIRTUAL VISIT (OUTPATIENT)
Dept: PAIN MANAGEMENT | Age: 66
End: 2021-02-19
Payer: COMMERCIAL

## 2021-02-19 DIAGNOSIS — M75.81 TENDINITIS OF RIGHT ROTATOR CUFF: ICD-10-CM

## 2021-02-19 DIAGNOSIS — G89.4 CHRONIC PAIN SYNDROME: ICD-10-CM

## 2021-02-19 DIAGNOSIS — M51.36 DDD (DEGENERATIVE DISC DISEASE), LUMBAR: ICD-10-CM

## 2021-02-19 DIAGNOSIS — E11.40 CHRONIC PAINFUL DIABETIC NEUROPATHY (HCC): ICD-10-CM

## 2021-02-19 DIAGNOSIS — I48.0 PAF (PAROXYSMAL ATRIAL FIBRILLATION) (HCC): ICD-10-CM

## 2021-02-19 DIAGNOSIS — M75.81 ROTATOR CUFF TENDONITIS, RIGHT: ICD-10-CM

## 2021-02-19 DIAGNOSIS — F51.01 PRIMARY INSOMNIA: ICD-10-CM

## 2021-02-19 DIAGNOSIS — M79.7 FIBROMYALGIA: ICD-10-CM

## 2021-02-19 DIAGNOSIS — R00.2 PALPITATIONS: ICD-10-CM

## 2021-02-19 DIAGNOSIS — F33.1 MODERATE EPISODE OF RECURRENT MAJOR DEPRESSIVE DISORDER (HCC): ICD-10-CM

## 2021-02-19 DIAGNOSIS — M50.30 DDD (DEGENERATIVE DISC DISEASE), CERVICAL: ICD-10-CM

## 2021-02-19 PROCEDURE — 3017F COLORECTAL CA SCREEN DOC REV: CPT | Performed by: INTERNAL MEDICINE

## 2021-02-19 PROCEDURE — 2022F DILAT RTA XM EVC RTNOPTHY: CPT | Performed by: INTERNAL MEDICINE

## 2021-02-19 PROCEDURE — 99214 OFFICE O/P EST MOD 30 MIN: CPT | Performed by: INTERNAL MEDICINE

## 2021-02-19 PROCEDURE — G8427 DOCREV CUR MEDS BY ELIG CLIN: HCPCS | Performed by: INTERNAL MEDICINE

## 2021-02-19 PROCEDURE — 3046F HEMOGLOBIN A1C LEVEL >9.0%: CPT | Performed by: INTERNAL MEDICINE

## 2021-02-19 RX ORDER — TOPIRAMATE 50 MG/1
TABLET, FILM COATED ORAL
Qty: 60 TABLET | Refills: 0 | Status: SHIPPED | OUTPATIENT
Start: 2021-02-19 | End: 2021-03-01

## 2021-02-19 RX ORDER — OXYCODONE AND ACETAMINOPHEN 7.5; 325 MG/1; MG/1
1 TABLET ORAL EVERY 6 HOURS PRN
Qty: 84 TABLET | Refills: 0 | Status: SHIPPED | OUTPATIENT
Start: 2021-02-19 | End: 2021-03-19 | Stop reason: SDUPTHER

## 2021-02-19 NOTE — PROGRESS NOTES
TELE HEALTH VISIT (AUDIO-VISUAL)    Pursuant to the emergency declaration under the [facility], [facility] waiver authority and the [employer] Act, this Virtual  Visit was conducted, with patient's/legal guardian's consent, to reduce the patient's risk of exposure to COVID-19 and provide continuity of care for an established patient. Service is  provided through a video synchronous discussion virtually to substitute for in-person clinic visit. Due to this being a TeleHealth encounter (During TFGXS-38 public health emergency), evaluation of the following organ systems was limited: Vitals/Constitutional/EENT/Resp/CV/GI//MS/Neuro/Skin/Ewur-Yljp-Wet. Alexei Rinaldi  1955  [id number]    Ms. Nena Mohamud is being seen virtually for a follow up visit using one of the following techniques  Doxy   Informed verbal consent to the virtual visit was obtained from Ms. Susana. Risks associated with HIPPA compliance with the virtual visit was explained to the patient. Ms. Nena Mohamud is at her residence and Dr. Thiago Shearer is in his office. HISTORY OF PRESENT ILLNESS:  Ms. Nena Mohamud is a 60 y.o. female returns for a follow up visit for multiple medical problems. Her current presenting problems are   1. Chronic migraine    2. Chronic pain syndrome    3. Fibromyalgia    4. DDD (degenerative disc disease), lumbar    5. Chronic painful diabetic neuropathy ([address]    6. DDD (degenerative disc disease), cervical    7. Primary insomnia    8. Moderate episode of recurrent major depressive disorder ([address]    9. Rotator cuff tendonitis, right    10. Tendinitis of right rotator cuff    .     As per information/history obtained from the PADT(patient assessment and documentation tool) - She complains of pain in the head and lower back with radiation to the upper leg Bilateral, knees Bilateral, lower leg Bilateral, ankles Bilateral and feet Bilateral She rates the pain 8/10 and describes it as aching. Pain is made worse by: movement. Current treatment regimen has helped relieve about 20% of the pain. She denies side effects from the current pain regimen. Patient reports that since the last follow up visit the physical functioning is unchanged, family/social relationships are unchanged, mood is unchanged and sleep patterns are unchanged, and that the overall functioning is unchanged. Patient denies neurological bowel or bladder. Patient denies misusing/abusing her narcotic pain medications or using any illegal drugs. There are No indicators for possible drug abuse, addiction or diversion problems. Upon obtaining the medical history from Ms. [address] regarding today's office visit for her presenting problems, patient complains she is having increase headaches still, almost daily. She states the muscles are feeling tight in the back and neck. She says she is using Maxalt as needed for headaches along with Aimovig, but not working too well. Patient states her sleep is fair. Has fairly normal sleep latency. Averages about 4-6 hours of sleep a night. Denies any signs of sleep apnea. Feels somewhat rested in the morning. She mentions she is using Seroquel. She states the fibromyalgia is acting up. Patient's  subjective report of her mood is fair. she describes occasional symptoms of depression, occasional  irritability and some mood swings. Describes her mood as being neutral and reports some pleasure in her daily activities. Reports  fair  appetite, energy and concentration. Able to function well in different aspects of her daily activities. Denies suicidal or homicidal ideation. Denies any complaints of increased tension, does   Worry sometimes and occasional  irritability  she denies any c/o increased anxiety, No c/o panic attacks or symptoms of PTSD. She says she is using Cymbalta still. She mentions she is managing light house chores.  She states she wa in the ER recently for chest pains. ALLERGIES/PAST MED/FAM/SOC HISTORY: Ms. Aime Kirkland allergies, past medical, family and social history were reviewed in the chart. Ms. Aime Kirkland current medications are   Outpatient Medications Prior to Visit   Medication Sig Dispense Refill    metoprolol succinate (TOPROL XL) 50 MG extended release tablet TAKE 0.5 TABLETS BY MOUTH 2 TIMES DAILY (WITH MEALS) 30 tablet 5    omeprazole (PRILOSEC) 20 MG delayed release capsule TAKE 1 CAPSULE BY MOUTH DAILY 30 capsule 1    AIMOVIG 70 MG/ML SOAJ INJECT 140 MLS INTO THE SKIN EVERY 30 DAYS 1 mL 1    QUEtiapine (SEROQUEL) 25 MG tablet TAKE 1-2 TABLETS BY MOUTH NIGHTLY 60 tablet 1    DULoxetine (CYMBALTA) 60 MG extended release capsule TAKE 1 CAPSULE BY MOUTH DAILY 30 capsule 1    aspirin 81 MG chewable tablet Take 1 tablet by mouth daily 30 tablet 3    dicyclomine (BENTYL) 20 MG tablet Take 20 mg by mouth 4 times daily (before meals and nightly)       nitroGLYCERIN (NITROSTAT) 0.4 MG SL tablet Place 1 tablet under the tongue every 5 minutes as needed for Chest pain up to max of 3 total doses. If no relief after 1 dose, call 911. 25 tablet 3    Nutritional Supplements (ENSURE) LIQD Take 1 Can by mouth 3 times daily as needed (nutrition) 90 Can 5    insulin glargine (BASAGLAR KWIKPEN) 100 UNIT/ML injection pen Inject 40 Units into the skin nightly (Patient taking differently: Inject 60 Units into the skin nightly ) 15 mL 5    hydrALAZINE (APRESOLINE) 50 MG tablet Take 1 tablet by mouth 2 times daily 60 tablet 5    atorvastatin (LIPITOR) 80 MG tablet Take 1 tablet by mouth nightly 90 tablet 5    amLODIPine (NORVASC) 5 MG tablet Take 1 tablet by mouth 2 times daily 180 tablet 5    lidocaine (XYLOCAINE) 5 % ointment Apply topically as needed.  5 g 3    blood glucose test strips (TRUE METRIX BLOOD GLUCOSE TEST) strip 1 each by Does not apply route 2 times daily 100 each 5    Blood Glucose Monitoring Suppl (TRUE METRIX METER) w/Device KIT 1 kit by Does not apply route 2 times daily 1 kit 0    ondansetron (ZOFRAN) 4 MG tablet Take 1 tablet by mouth 3 times daily as needed for Nausea or Vomiting 30 tablet 0    clopidogrel (PLAVIX) 75 MG tablet TAKE 1 TABLET BY MOUTH DA JULIEN 90 tablet 5    UltiCare Alcohol Swabs 70 % PADS USE TO TEST BLOOD GLUCOSE THREE TIMES DAILY 100 each 5    Nebulizers (COMPRESSOR/NEBULIZER) MISC 1 Device by Does not apply route 4 times daily as needed (SOB / Wheezing) 1 each 0    albuterol (PROVENTIL) (2.5 MG/3ML) 0.083% nebulizer solution Take 3 mLs by nebulization every 4 hours as needed for Wheezing 120 each 5    Lancet Devices (SIMPLE DIAGNOSTICS LANCING DEV) MISC USE WITH LANCETS TO TEST BLOOD GLUCOSE 1 each 11    Pharmacist Choice Lancets MISC USE TO TEST BLOOD GLUCOSE THREE TIMES DAILY 300 each 5    UNIFINE PENTIPS 31G X 8 MM MISC USE WITH insulin pens 100 each 5    budesonide-formoterol (SYMBICORT) 160-4.5 MCG/ACT AERO Inhale 2 puffs into the lungs daily 2 Inhaler 5    albuterol sulfate HFA (VENTOLIN HFA) 108 (90 Base) MCG/ACT inhaler Inhale 2 puffs into the lungs every 4 hours as needed for Wheezing or Shortness of Breath 3 Inhaler 5    ranolazine (RANEXA) 1000 MG extended release tablet Take 1 tablet by mouth 2 times daily 60 tablet 8    rizatriptan (MAXALT) 10 MG tablet Take 1 tablet by mouth once as needed for Migraine May repeat in 2 hours if needed 9 tablet 0    Rimegepant Sulfate (NURTEC) 75 MG TBDP Take 1 tablet by mouth daily 8 tablet 0     No facility-administered medications prior to visit. REVIEW OF SYSTEMS:     Respiratory: Negative for shortness of breath. Cardiovascular: Negative for chest pain, palpitations  Gastrointestinal: Negative for blood in stool, abdominal distention, nausea, vomiting, abdominal pain, diarrhea,constipation.   Neurological: Negative for speech difficulty, weakness and light-headedness, dizziness, tremors, sleepiness  Psychiatric/Behavioral: Negative for suicidal ideas, hallucinations, behavioral problems, self-injury, decreased concentration/cognition, agitation, confusion. PHYSICAL EXAM:   Nursing note and vitals reviewed. There were no vitals taken for this visit. General Appearance: Patient is well nourished, well developed, well groomed and in no acute distress. Skin: Skin is warm and dry, good turgor . No rash or lesions noted. She is not diaphoretic. Pulmonary/Chest: Effort normal. No respiratory distress or use of accessory muscles. Auscultation revealing normal air entry. She does not have wheezes in the lung fields. She has no rales. Cardiovascular: Normal rate, regular rhythm, normal heart sounds, and does not have murmur. Exam reveals no gallop and no friction rub. Musculoskeletal / Extremities: Range of motion is normal. Gait is normal, assistive devices use: none. She exhibits edema: none, and no tenderness. Neurological/Psychiatric:She is alert and oriented to person, place, and time. Coordination is  normal.   Judgement and Insight is normal  Her mood is Appropriate and affect is Neutral/Euthymic(normal) . Her behavior is normal.   thought content normal.        IMPRESSION:     1. Chronic migraine - INADEQUATELY CONTROLLED: treatment plan adjusted as below    2. Chronic pain syndrome - INADEQUATELY CONTROLLED: treatment plan adjusted as below    3. Fibromyalgia - INADEQUATELY CONTROLLED: treatment plan adjusted as below    4. DDD (degenerative disc disease), lumbar - STABLE: Continue current treatment plan    5. Chronic painful diabetic neuropathy ([facility]- STABLE: Continue current treatment plan     6. DDD (degenerative disc disease), cervical - STABLE: Continue current treatment plan    7. Primary insomnia - STABLE: Continue current treatment plan    8. Moderate episode of recurrent major depressive disorder (HCC) - STABLE: Continue current treatment plan    9.  Rotator cuff tendonitis, right - STABLE: Continue current treatment plan    10. Tendinitis of right rotator cuff - STABLE: Continue current treatment plan        PLAN:  Informed verbal consent was obtained.  -Interim history reviewed  - most recent labs reviewed, + increase creatine, glucose and + anemia and rest are baseline okay. -Advised to start Topamax 50 mg increase to 50 mg bid   -she was advised  to avoid using too many OTC analgesics to control the headaches, avoid chocolates, increased caffeine, cheeses and MSG nitrite containing foods, cigarette smoking. To avoid bright lights, strong smells and skipping meals.   -Continue with Aimovig and Maxalt   -she was advised proper sleep hygiene-told to avoid:use of caffeine or other stimulants after noon, alcohol use near bedtime, long or frequent naps during the day, erratic sleep schedule, heavy meals near bedtime, vigorous exercise near bedtime and use of electronic devices near bedtime   -Continue with Seroquel   -Will try getting authorization for Botox   -Continue with Percocet 3 per day   -Continue with Cymbalta 60 mg   Ms. Too Colon will be prescribed  the medications  listed below which are for treatment of her presenting  medical problems which for this visit include:   Diagnoses of Chronic migraine, Chronic pain syndrome, Fibromyalgia, DDD (degenerative disc disease), lumbar, Chronic painful diabetic neuropathy ([address] DDD (degenerative disc disease), cervical, Primary insomnia, Moderate episode of recurrent major depressive disorder ([address] Rotator cuff tendonitis, right, and Tendinitis of right rotator cuff were pertinent to this visit. Medications/orders associated with this visit:    Current Outpatient Medications   Medication Sig Dispense Refill    oxyCODONE-acetaminophen (PERCOCET) 7.5-325 MG per tablet Take 1 tablet by mouth every 6 hours as needed for Pain (max 3-4 day) for up to 28 days.  84 tablet 0    topiramate (TOPAMAX) 50 MG tablet Take 1 tablet po nightly for a week then increase to 1 tablet po BID 60 tablet 0    metoprolol succinate (TOPROL XL) 50 MG extended release tablet TAKE 0.5 TABLETS BY MOUTH 2 TIMES DAILY (WITH MEALS) 30 tablet 5    omeprazole (PRILOSEC) 20 MG delayed release capsule TAKE 1 CAPSULE BY MOUTH DAILY 30 capsule 1    AIMOVIG 70 MG/ML SOAJ INJECT 140 MLS INTO THE SKIN EVERY 30 DAYS 1 mL 1    QUEtiapine (SEROQUEL) 25 MG tablet TAKE 1-2 TABLETS BY MOUTH NIGHTLY 60 tablet 1    DULoxetine (CYMBALTA) 60 MG extended release capsule TAKE 1 CAPSULE BY MOUTH DAILY 30 capsule 1    aspirin 81 MG chewable tablet Take 1 tablet by mouth daily 30 tablet 3    dicyclomine (BENTYL) 20 MG tablet Take 20 mg by mouth 4 times daily (before meals and nightly)       nitroGLYCERIN (NITROSTAT) 0.4 MG SL tablet Place 1 tablet under the tongue every 5 minutes as needed for Chest pain up to max of 3 total doses. If no relief after 1 dose, call 911. 25 tablet 3    Nutritional Supplements (ENSURE) LIQD Take 1 Can by mouth 3 times daily as needed (nutrition) 90 Can 5    insulin glargine (BASAGLAR KWIKPEN) 100 UNIT/ML injection pen Inject 40 Units into the skin nightly (Patient taking differently: Inject 60 Units into the skin nightly ) 15 mL 5    hydrALAZINE (APRESOLINE) 50 MG tablet Take 1 tablet by mouth 2 times daily 60 tablet 5    atorvastatin (LIPITOR) 80 MG tablet Take 1 tablet by mouth nightly 90 tablet 5    amLODIPine (NORVASC) 5 MG tablet Take 1 tablet by mouth 2 times daily 180 tablet 5    lidocaine (XYLOCAINE) 5 % ointment Apply topically as needed.  5 g 3    blood glucose test strips (TRUE METRIX BLOOD GLUCOSE TEST) strip 1 each by Does not apply route 2 times daily 100 each 5    Blood Glucose Monitoring Suppl (TRUE METRIX METER) w/Device KIT 1 kit by Does not apply route 2 times daily 1 kit 0    ondansetron (ZOFRAN) 4 MG tablet Take 1 tablet by mouth 3 times daily as needed for Nausea or Vomiting 30 tablet 0    clopidogrel (PLAVIX) 75 MG tablet TAKE 1 TABLET BY MOUTH DA JULIEN 90 tablet 5    UltiCare Alcohol Swabs 70 % PADS USE TO TEST BLOOD GLUCOSE THREE TIMES DAILY 100 each 5    Nebulizers (COMPRESSOR/NEBULIZER) MISC 1 Device by Does not apply route 4 times daily as needed (SOB / Wheezing) 1 each 0    albuterol (PROVENTIL) (2.5 MG/3ML) 0.083% nebulizer solution Take 3 mLs by nebulization every 4 hours as needed for Wheezing 120 each 5    Lancet Devices (SIMPLE DIAGNOSTICS LANCING DEV) MISC USE WITH LANCETS TO TEST BLOOD GLUCOSE 1 each 11    Pharmacist Choice Lancets MISC USE TO TEST BLOOD GLUCOSE THREE TIMES DAILY 300 each 5    UNIFINE PENTIPS 31G X 8 MM MISC USE WITH insulin pens 100 each 5    budesonide-formoterol (SYMBICORT) 160-4.5 MCG/ACT AERO Inhale 2 puffs into the lungs daily 2 Inhaler 5    albuterol sulfate HFA (VENTOLIN HFA) 108 (90 Base) MCG/ACT inhaler Inhale 2 puffs into the lungs every 4 hours as needed for Wheezing or Shortness of Breath 3 Inhaler 5    ranolazine (RANEXA) 1000 MG extended release tablet Take 1 tablet by mouth 2 times daily 60 tablet 8    rizatriptan (MAXALT) 10 MG tablet Take 1 tablet by mouth once as needed for Migraine May repeat in 2 hours if needed 9 tablet 0     No current facility-administered medications for this visit. Goals of current treatment regimen include improvement in pain, restoration of functioning- with focus on improvement in physical performance, general activity, work or disability,emotional distress, health care utilization and  decreased medication consumption. Will continue to monitor progress towards achieving/maintaining therapeutic goals with special emphasis on  1. Improvement in perceived interfernce  of pain with ADL's. Ability to do home exercises independently. Ability to do household chores indoor and/or outdoor work and social and leisure activities. To increase flexibility/ROM, strength and endurance.  Improve psychosocial and physical functioning.- she is not showing any significant progress/or showing regression

## 2021-02-22 ENCOUNTER — TELEPHONE (OUTPATIENT)
Dept: PAIN MANAGEMENT | Age: 66
End: 2021-02-22

## 2021-02-24 ENCOUNTER — TELEPHONE (OUTPATIENT)
Dept: PRIMARY CARE CLINIC | Age: 66
End: 2021-02-24

## 2021-02-24 NOTE — TELEPHONE ENCOUNTER
Patient states she is having spasms under her rib cage, nausea and hurts sometimes when she breathe. This has been going on for over a week and seems to be getting worse. She has lost weight.   Please advise

## 2021-03-01 ENCOUNTER — APPOINTMENT (OUTPATIENT)
Dept: CT IMAGING | Age: 66
End: 2021-03-01
Payer: COMMERCIAL

## 2021-03-01 ENCOUNTER — OFFICE VISIT (OUTPATIENT)
Dept: PRIMARY CARE CLINIC | Age: 66
End: 2021-03-01
Payer: COMMERCIAL

## 2021-03-01 ENCOUNTER — HOSPITAL ENCOUNTER (OUTPATIENT)
Age: 66
Setting detail: OBSERVATION
Discharge: HOME | End: 2021-03-05
Attending: FAMILY MEDICINE | Admitting: FAMILY MEDICINE
Payer: COMMERCIAL

## 2021-03-01 ENCOUNTER — APPOINTMENT (OUTPATIENT)
Dept: GENERAL RADIOLOGY | Age: 66
End: 2021-03-01
Payer: COMMERCIAL

## 2021-03-01 VITALS
BODY MASS INDEX: 24.15 KG/M2 | HEART RATE: 85 BPM | SYSTOLIC BLOOD PRESSURE: 170 MMHG | TEMPERATURE: 97.3 F | HEIGHT: 60 IN | DIASTOLIC BLOOD PRESSURE: 77 MMHG | WEIGHT: 123 LBS

## 2021-03-01 DIAGNOSIS — I10 ESSENTIAL HYPERTENSION: Chronic | ICD-10-CM

## 2021-03-01 DIAGNOSIS — R11.0 CHRONIC NAUSEA: ICD-10-CM

## 2021-03-01 DIAGNOSIS — G43.711 HEADACHE, CHRONIC MIGRAINE WITHOUT AURA, INTRACTABLE, WITH STATUS: ICD-10-CM

## 2021-03-01 DIAGNOSIS — R07.9 CHEST PAIN, UNSPECIFIED TYPE: Primary | ICD-10-CM

## 2021-03-01 DIAGNOSIS — R68.89 COLD INTOLERANCE: ICD-10-CM

## 2021-03-01 DIAGNOSIS — I10 POOR HIGH BLOOD PRESSURE CONTROL: ICD-10-CM

## 2021-03-01 LAB
A/G RATIO: 1 (ref 1.1–2.2)
ALBUMIN SERPL-MCNC: 3.9 G/DL (ref 3.4–5)
ALP BLD-CCNC: 111 U/L (ref 40–129)
ALT SERPL-CCNC: 7 U/L (ref 10–40)
ANION GAP SERPL CALCULATED.3IONS-SCNC: 11 MMOL/L (ref 3–16)
APTT: 38.7 SEC (ref 24.2–36.2)
AST SERPL-CCNC: 15 U/L (ref 15–37)
BASOPHILS ABSOLUTE: 0 K/UL (ref 0–0.2)
BASOPHILS RELATIVE PERCENT: 0.2 %
BILIRUB SERPL-MCNC: <0.2 MG/DL (ref 0–1)
BUN BLDV-MCNC: 20 MG/DL (ref 7–20)
CALCIUM SERPL-MCNC: 9.3 MG/DL (ref 8.3–10.6)
CHLORIDE BLD-SCNC: 102 MMOL/L (ref 99–110)
CO2: 26 MMOL/L (ref 21–32)
CREAT SERPL-MCNC: 1.3 MG/DL (ref 0.6–1.2)
EOSINOPHILS ABSOLUTE: 0.1 K/UL (ref 0–0.6)
EOSINOPHILS RELATIVE PERCENT: 2.7 %
GFR AFRICAN AMERICAN: 50
GFR NON-AFRICAN AMERICAN: 41
GLOBULIN: 3.8 G/DL
GLUCOSE BLD-MCNC: 138 MG/DL (ref 70–99)
GLUCOSE BLD-MCNC: 140 MG/DL (ref 70–99)
HCT VFR BLD CALC: 29.7 % (ref 36–48)
HEMOGLOBIN: 9.7 G/DL (ref 12–16)
INR BLD: 1.02 (ref 0.86–1.14)
LYMPHOCYTES ABSOLUTE: 1.2 K/UL (ref 1–5.1)
LYMPHOCYTES RELATIVE PERCENT: 24.9 %
MCH RBC QN AUTO: 32 PG (ref 26–34)
MCHC RBC AUTO-ENTMCNC: 32.8 G/DL (ref 31–36)
MCV RBC AUTO: 97.6 FL (ref 80–100)
MONOCYTES ABSOLUTE: 0.2 K/UL (ref 0–1.3)
MONOCYTES RELATIVE PERCENT: 4.6 %
NEUTROPHILS ABSOLUTE: 3.2 K/UL (ref 1.7–7.7)
NEUTROPHILS RELATIVE PERCENT: 67.6 %
PDW BLD-RTO: 14.6 % (ref 12.4–15.4)
PERFORMED ON: ABNORMAL
PLATELET # BLD: 225 K/UL (ref 135–450)
PMV BLD AUTO: 8.9 FL (ref 5–10.5)
POTASSIUM REFLEX MAGNESIUM: 3.8 MMOL/L (ref 3.5–5.1)
PRO-BNP: 734 PG/ML (ref 0–124)
PROTHROMBIN TIME: 11.8 SEC (ref 10–13.2)
RBC # BLD: 3.04 M/UL (ref 4–5.2)
SODIUM BLD-SCNC: 139 MMOL/L (ref 136–145)
TOTAL PROTEIN: 7.7 G/DL (ref 6.4–8.2)
TROPONIN: <0.01 NG/ML
WBC # BLD: 4.8 K/UL (ref 4–11)

## 2021-03-01 PROCEDURE — 96374 THER/PROPH/DIAG INJ IV PUSH: CPT

## 2021-03-01 PROCEDURE — 85730 THROMBOPLASTIN TIME PARTIAL: CPT

## 2021-03-01 PROCEDURE — 80053 COMPREHEN METABOLIC PANEL: CPT

## 2021-03-01 PROCEDURE — 6370000000 HC RX 637 (ALT 250 FOR IP): Performed by: GENERAL ACUTE CARE HOSPITAL

## 2021-03-01 PROCEDURE — 93005 ELECTROCARDIOGRAM TRACING: CPT | Performed by: GENERAL ACUTE CARE HOSPITAL

## 2021-03-01 PROCEDURE — 1036F TOBACCO NON-USER: CPT | Performed by: NURSE PRACTITIONER

## 2021-03-01 PROCEDURE — 36415 COLL VENOUS BLD VENIPUNCTURE: CPT

## 2021-03-01 PROCEDURE — 83880 ASSAY OF NATRIURETIC PEPTIDE: CPT

## 2021-03-01 PROCEDURE — 84484 ASSAY OF TROPONIN QUANT: CPT

## 2021-03-01 PROCEDURE — 96375 TX/PRO/DX INJ NEW DRUG ADDON: CPT

## 2021-03-01 PROCEDURE — 85025 COMPLETE CBC W/AUTO DIFF WBC: CPT

## 2021-03-01 PROCEDURE — 2500000003 HC RX 250 WO HCPCS: Performed by: GENERAL ACUTE CARE HOSPITAL

## 2021-03-01 PROCEDURE — 6370000000 HC RX 637 (ALT 250 FOR IP): Performed by: FAMILY MEDICINE

## 2021-03-01 PROCEDURE — G8420 CALC BMI NORM PARAMETERS: HCPCS | Performed by: NURSE PRACTITIONER

## 2021-03-01 PROCEDURE — 71045 X-RAY EXAM CHEST 1 VIEW: CPT

## 2021-03-01 PROCEDURE — 85610 PROTHROMBIN TIME: CPT

## 2021-03-01 PROCEDURE — 70450 CT HEAD/BRAIN W/O DYE: CPT

## 2021-03-01 PROCEDURE — G8427 DOCREV CUR MEDS BY ELIG CLIN: HCPCS | Performed by: NURSE PRACTITIONER

## 2021-03-01 PROCEDURE — G8484 FLU IMMUNIZE NO ADMIN: HCPCS | Performed by: NURSE PRACTITIONER

## 2021-03-01 PROCEDURE — 2580000003 HC RX 258: Performed by: FAMILY MEDICINE

## 2021-03-01 PROCEDURE — G0378 HOSPITAL OBSERVATION PER HR: HCPCS

## 2021-03-01 PROCEDURE — 99213 OFFICE O/P EST LOW 20 MIN: CPT | Performed by: NURSE PRACTITIONER

## 2021-03-01 PROCEDURE — 3017F COLORECTAL CA SCREEN DOC REV: CPT | Performed by: NURSE PRACTITIONER

## 2021-03-01 PROCEDURE — 99283 EMERGENCY DEPT VISIT LOW MDM: CPT

## 2021-03-01 RX ORDER — ASPIRIN 81 MG/1
81 TABLET, CHEWABLE ORAL DAILY
Status: DISCONTINUED | OUTPATIENT
Start: 2021-03-02 | End: 2021-03-05 | Stop reason: HOSPADM

## 2021-03-01 RX ORDER — ONDANSETRON 2 MG/ML
4 INJECTION INTRAMUSCULAR; INTRAVENOUS EVERY 6 HOURS PRN
Status: DISCONTINUED | OUTPATIENT
Start: 2021-03-01 | End: 2021-03-05 | Stop reason: HOSPADM

## 2021-03-01 RX ORDER — LABETALOL HYDROCHLORIDE 5 MG/ML
10 INJECTION, SOLUTION INTRAVENOUS ONCE
Status: COMPLETED | OUTPATIENT
Start: 2021-03-01 | End: 2021-03-01

## 2021-03-01 RX ORDER — OXYCODONE AND ACETAMINOPHEN 7.5; 325 MG/1; MG/1
1 TABLET ORAL EVERY 6 HOURS PRN
Status: DISCONTINUED | OUTPATIENT
Start: 2021-03-01 | End: 2021-03-05 | Stop reason: HOSPADM

## 2021-03-01 RX ORDER — SODIUM CHLORIDE 0.9 % (FLUSH) 0.9 %
10 SYRINGE (ML) INJECTION PRN
Status: DISCONTINUED | OUTPATIENT
Start: 2021-03-01 | End: 2021-03-05 | Stop reason: HOSPADM

## 2021-03-01 RX ORDER — BUDESONIDE AND FORMOTEROL FUMARATE DIHYDRATE 160; 4.5 UG/1; UG/1
2 AEROSOL RESPIRATORY (INHALATION) DAILY
Status: DISCONTINUED | OUTPATIENT
Start: 2021-03-02 | End: 2021-03-05 | Stop reason: HOSPADM

## 2021-03-01 RX ORDER — INSULIN GLARGINE 100 [IU]/ML
60 INJECTION, SOLUTION SUBCUTANEOUS 2 TIMES DAILY
Status: DISCONTINUED | OUTPATIENT
Start: 2021-03-01 | End: 2021-03-02

## 2021-03-01 RX ORDER — METOPROLOL SUCCINATE 25 MG/1
25 TABLET, EXTENDED RELEASE ORAL 2 TIMES DAILY WITH MEALS
Status: DISCONTINUED | OUTPATIENT
Start: 2021-03-01 | End: 2021-03-05 | Stop reason: HOSPADM

## 2021-03-01 RX ORDER — ACETAMINOPHEN 325 MG/1
650 TABLET ORAL EVERY 6 HOURS PRN
Status: DISCONTINUED | OUTPATIENT
Start: 2021-03-01 | End: 2021-03-05 | Stop reason: HOSPADM

## 2021-03-01 RX ORDER — ACETAMINOPHEN 650 MG/1
650 SUPPOSITORY RECTAL EVERY 6 HOURS PRN
Status: DISCONTINUED | OUTPATIENT
Start: 2021-03-01 | End: 2021-03-05 | Stop reason: HOSPADM

## 2021-03-01 RX ORDER — POLYETHYLENE GLYCOL 3350 17 G/17G
17 POWDER, FOR SOLUTION ORAL DAILY PRN
Status: DISCONTINUED | OUTPATIENT
Start: 2021-03-01 | End: 2021-03-05 | Stop reason: HOSPADM

## 2021-03-01 RX ORDER — PROMETHAZINE HYDROCHLORIDE 25 MG/1
12.5 TABLET ORAL EVERY 6 HOURS PRN
Status: DISCONTINUED | OUTPATIENT
Start: 2021-03-01 | End: 2021-03-05 | Stop reason: HOSPADM

## 2021-03-01 RX ORDER — QUETIAPINE FUMARATE 25 MG/1
25 TABLET, FILM COATED ORAL NIGHTLY
Status: DISCONTINUED | OUTPATIENT
Start: 2021-03-01 | End: 2021-03-05 | Stop reason: HOSPADM

## 2021-03-01 RX ORDER — CLOPIDOGREL BISULFATE 75 MG/1
75 TABLET ORAL DAILY
Status: DISCONTINUED | OUTPATIENT
Start: 2021-03-02 | End: 2021-03-05 | Stop reason: HOSPADM

## 2021-03-01 RX ORDER — ASPIRIN 81 MG/1
324 TABLET, CHEWABLE ORAL ONCE
Status: COMPLETED | OUTPATIENT
Start: 2021-03-01 | End: 2021-03-01

## 2021-03-01 RX ORDER — HYDRALAZINE HYDROCHLORIDE 50 MG/1
50 TABLET, FILM COATED ORAL 2 TIMES DAILY
Status: DISCONTINUED | OUTPATIENT
Start: 2021-03-01 | End: 2021-03-05 | Stop reason: HOSPADM

## 2021-03-01 RX ORDER — NITROGLYCERIN 0.4 MG/1
0.4 TABLET SUBLINGUAL ONCE
Status: COMPLETED | OUTPATIENT
Start: 2021-03-01 | End: 2021-03-01

## 2021-03-01 RX ORDER — INSULIN GLARGINE 100 [IU]/ML
60 INJECTION, SOLUTION SUBCUTANEOUS 2 TIMES DAILY
Status: ON HOLD | COMMUNITY
End: 2021-03-05 | Stop reason: SDUPTHER

## 2021-03-01 RX ORDER — ATORVASTATIN CALCIUM 80 MG/1
80 TABLET, FILM COATED ORAL NIGHTLY
Status: DISCONTINUED | OUTPATIENT
Start: 2021-03-01 | End: 2021-03-05 | Stop reason: HOSPADM

## 2021-03-01 RX ORDER — AMLODIPINE BESYLATE 5 MG/1
5 TABLET ORAL 2 TIMES DAILY
Status: DISCONTINUED | OUTPATIENT
Start: 2021-03-01 | End: 2021-03-05 | Stop reason: HOSPADM

## 2021-03-01 RX ORDER — DULOXETIN HYDROCHLORIDE 60 MG/1
60 CAPSULE, DELAYED RELEASE ORAL NIGHTLY
Status: DISCONTINUED | OUTPATIENT
Start: 2021-03-01 | End: 2021-03-05 | Stop reason: HOSPADM

## 2021-03-01 RX ORDER — SODIUM CHLORIDE 0.9 % (FLUSH) 0.9 %
10 SYRINGE (ML) INJECTION EVERY 12 HOURS SCHEDULED
Status: DISCONTINUED | OUTPATIENT
Start: 2021-03-01 | End: 2021-03-05 | Stop reason: HOSPADM

## 2021-03-01 RX ADMIN — ASPIRIN 324 MG: 81 TABLET, CHEWABLE ORAL at 13:53

## 2021-03-01 RX ADMIN — DULOXETINE HYDROCHLORIDE 60 MG: 60 CAPSULE, DELAYED RELEASE ORAL at 20:32

## 2021-03-01 RX ADMIN — ATORVASTATIN CALCIUM 80 MG: 80 TABLET, FILM COATED ORAL at 20:32

## 2021-03-01 RX ADMIN — HYDRALAZINE HYDROCHLORIDE 50 MG: 50 TABLET, FILM COATED ORAL at 20:32

## 2021-03-01 RX ADMIN — INSULIN GLARGINE 60 UNITS: 100 INJECTION, SOLUTION SUBCUTANEOUS at 20:30

## 2021-03-01 RX ADMIN — OXYCODONE HYDROCHLORIDE AND ACETAMINOPHEN 1 TABLET: 7.5; 325 TABLET ORAL at 20:32

## 2021-03-01 RX ADMIN — SODIUM CHLORIDE, PRESERVATIVE FREE 10 ML: 5 INJECTION INTRAVENOUS at 20:33

## 2021-03-01 RX ADMIN — METOPROLOL SUCCINATE 25 MG: 25 TABLET, EXTENDED RELEASE ORAL at 20:32

## 2021-03-01 RX ADMIN — LABETALOL HYDROCHLORIDE 10 MG: 5 INJECTION, SOLUTION INTRAVENOUS at 12:08

## 2021-03-01 RX ADMIN — QUETIAPINE FUMARATE 25 MG: 25 TABLET ORAL at 20:32

## 2021-03-01 RX ADMIN — AMLODIPINE BESYLATE 5 MG: 5 TABLET ORAL at 20:32

## 2021-03-01 RX ADMIN — NITROGLYCERIN 0.4 MG: 0.4 TABLET, ORALLY DISINTEGRATING SUBLINGUAL at 17:35

## 2021-03-01 RX ADMIN — Medication 10 ML: at 20:32

## 2021-03-01 ASSESSMENT — ENCOUNTER SYMPTOMS
SHORTNESS OF BREATH: 0
NAUSEA: 1
DIARRHEA: 0
SORE THROAT: 0
NAUSEA: 1
CHEST TIGHTNESS: 0
SHORTNESS OF BREATH: 0
VOMITING: 0
BACK PAIN: 0
ABDOMINAL PAIN: 0
VOMITING: 0

## 2021-03-01 ASSESSMENT — PAIN DESCRIPTION - PAIN TYPE: TYPE: ACUTE PAIN

## 2021-03-01 ASSESSMENT — PAIN SCALES - WONG BAKER
WONGBAKER_NUMERICALRESPONSE: 0
WONGBAKER_NUMERICALRESPONSE: 0

## 2021-03-01 ASSESSMENT — PAIN DESCRIPTION - LOCATION
LOCATION: CHEST

## 2021-03-01 ASSESSMENT — PAIN SCALES - GENERAL
PAINLEVEL_OUTOF10: 8

## 2021-03-01 ASSESSMENT — PAIN DESCRIPTION - ORIENTATION: ORIENTATION: LEFT

## 2021-03-01 NOTE — ED NOTES
Hospitalist at bedside. Made her aware of chest pressure. MD states she will order Nitro.      Rebeca Lopez RN  03/01/21 [id number]

## 2021-03-01 NOTE — ED PROVIDER NOTES
[address] [facility]        Pt Name: Rayray Martínez  MRN: [id number]  [place] 1955  Date of evaluation: 3/1/2021  Provider: CYRUS Blackman - LINDA  PCP: Radha Blanca MD    STEPHEN. I have evaluated this patient. My supervising physician was available for consultation. CHIEF COMPLAINT       Chief Complaint   Patient presents with    Chest Pain     sent by pcp office. reports chest pain and nausea x 3-4 days. taking nitro at home with relief. HISTORY OF PRESENT ILLNESS   (Location, Timing/Onset, Context/Setting, Quality, Duration, Modifying Factors, Severity, Associated Signs and Symptoms)  Note limiting factors. Rayray Martínez is a 60 y.o. female with history of multiple comorbidities including coronary artery disease, hypertension, DM, CHF, and atrial fibrillation who presents for evaluation of chest pain and nausea. Patient states that she has been taking at least 2 sublingual nitro tablets each day over the last 4 to 5 days. She reports little relief of her symptoms. Patient was seen at her PCP P office this morning for scheduled appointment and was advised to come to the emergency department for further evaluation and treatment. Patient does have cardiac stents. Patient also tells me that her blood pressure has been \"out of control\" lately despite taking her medications just as prescribed. She states that she has had a dull throbbing headache for the last week. Headache was gradual in onset. This is not the worst headache of her life. She denies dizziness, blurred vision, or extremity numbness or tingling. She denies having any neck pain. She reports mild chest heaviness at present and shortness of breath with exertion. There has been no recent travel, immobilization, or surgeries. There is no history of DVTs or PEs. Patient has been without fever, chills, or other symptoms.     Nursing Notes were all reviewed and agreed with or any disagreements were addressed in the HPI. REVIEW OF SYSTEMS    (2-9 systems for level 4, 10 or more for level 5)     Review of Systems   Constitutional: Negative for chills and fever. HENT: Negative for congestion and sore throat. Respiratory: Negative for chest tightness and shortness of breath. Cardiovascular: Negative for palpitations. Gastrointestinal: Positive for nausea. Negative for abdominal pain and vomiting. Endocrine: Negative for polydipsia and polyuria. Genitourinary: Negative for difficulty urinating and dysuria. Musculoskeletal: Negative for back pain, neck pain and neck stiffness. Skin: Negative for rash and wound. Allergic/Immunologic: Negative for immunocompromised state. Neurological: Negative for dizziness, weakness, light-headedness and headaches. Hematological: Does not bruise/bleed easily. Psychiatric/Behavioral: Negative for suicidal ideas. Positives and Pertinent negatives as per HPI. Except as noted above in the ROS, all other systems were reviewed and negative.        PAST MEDICAL HISTORY     Past Medical History:   Diagnosis Date    Acid reflux     Anemia     Anxiety     Arthritis     Asthma     Atrial fibrillation ([address]     Blood transfusion reaction     CAD (coronary artery disease) 12/3/2012    Cerebral artery occlusion with cerebral infarction ([address]     CHF (congestive heart failure) (HCC)     Chronic kidney disease     40% kidney functiom    COPD (chronic obstructive pulmonary disease) (HCC)     Depression     DM2 (diabetes mellitus, type 2) ([address]     Dysarthria     Fibromyalgia 6/7/2016    Headache(784.0) 2/19/2013    Hemisensory loss     Hyperlipidemia     Hypertension     IBS (irritable bowel syndrome)     Inferior vena cava occlusion (HCC)     Irritable bowel syndrome     Keratitis     MI, old     Neuropathy     Superior vena cava obstruction     Temporal arteritis ([address] 2/24/2014 SURGICAL HISTORY     Past Surgical History:   Procedure Laterality Date    ABLATION OF DYSRHYTHMIC FOCUS      ARTERY BIOPSY Right 04/23/2014    RIGHT TEMPORAL ARTERY BIOPSY    CATARACT REMOVAL Bilateral     CHOLECYSTECTOMY      CORONARY ANGIOPLASTY WITH STENT PLACEMENT      CORONARY ANGIOPLASTY WITH STENT PLACEMENT      HYSTERECTOMY      JOINT REPLACEMENT Right     KNEE ARTHROSCOPY Right     KNEE SURGERY      right knee replacement    PTCA  10/2019    LAD and RCA inrtervention    TUNNELED VENOUS PORT PLACEMENT      left thigh.   SMART PORT    UPPER GASTROINTESTINAL ENDOSCOPY N/A 7/6/2020    EGD DIAGNOSTIC ONLY performed by Johnell Gilford, MD at [facility]       Previous Medications    AIMOVIG 70 MG/ML SOAJ    INJECT 140 MLS INTO THE SKIN EVERY 30 DAYS    ALBUTEROL (PROVENTIL) (2.5 MG/3ML) 0.083% NEBULIZER SOLUTION    Take 3 mLs by nebulization every 4 hours as needed for Wheezing    ALBUTEROL SULFATE HFA (VENTOLIN HFA) 108 (90 BASE) MCG/ACT INHALER    Inhale 2 puffs into the lungs every 4 hours as needed for Wheezing or Shortness of Breath    AMLODIPINE (NORVASC) 5 MG TABLET    Take 1 tablet by mouth 2 times daily    ASPIRIN 81 MG CHEWABLE TABLET    Take 1 tablet by mouth daily    ATORVASTATIN (LIPITOR) 80 MG TABLET    Take 1 tablet by mouth nightly    BLOOD GLUCOSE TEST STRIPS (TRUE METRIX BLOOD GLUCOSE TEST) STRIP    1 each by Does not apply route 2 times daily    BUDESONIDE-FORMOTEROL (SYMBICORT) 160-4.5 MCG/ACT AERO    Inhale 2 puffs into the lungs daily    CLOPIDOGREL (PLAVIX) 75 MG TABLET    TAKE 1 TABLET BY MOUTH DA JULIEN    DICYCLOMINE (BENTYL) 20 MG TABLET    Take 20 mg by mouth 4 times daily (before meals and nightly)     DULOXETINE (CYMBALTA) 60 MG EXTENDED RELEASE CAPSULE    TAKE 1 CAPSULE BY MOUTH DAILY    HYDRALAZINE (APRESOLINE) 50 MG TABLET    Take 1 tablet by mouth 2 times daily    INSULIN GLARGINE (BASAGLAR KWIKPEN) 100 UNIT/ML INJECTION PEN    Inject 60 Units into the skin 2 times daily    LIDOCAINE (XYLOCAINE) 5 % OINTMENT    Apply topically as needed. METOPROLOL SUCCINATE (TOPROL XL) 50 MG EXTENDED RELEASE TABLET    TAKE 0.5 TABLETS BY MOUTH 2 TIMES DAILY (WITH MEALS)    NITROGLYCERIN (NITROSTAT) 0.4 MG SL TABLET    Place 1 tablet under the tongue every 5 minutes as needed for Chest pain up to max of 3 total doses. If no relief after 1 dose, call 911. NUTRITIONAL SUPPLEMENTS (ENSURE) LIQD    Take 1 Can by mouth 3 times daily as needed (nutrition)    OMEPRAZOLE (PRILOSEC) 20 MG DELAYED RELEASE CAPSULE    TAKE 1 CAPSULE BY MOUTH DAILY    ONDANSETRON (ZOFRAN) 4 MG TABLET    Take 1 tablet by mouth 3 times daily as needed for Nausea or Vomiting    OXYCODONE-ACETAMINOPHEN (PERCOCET) 7.5-325 MG PER TABLET    Take 1 tablet by mouth every 6 hours as needed for Pain (max 3-4 day) for up to 28 days. QUETIAPINE (SEROQUEL) 25 MG TABLET    TAKE 1-2 TABLETS BY MOUTH NIGHTLY    RANOLAZINE (RANEXA) 1000 MG EXTENDED RELEASE TABLET    Take 1 tablet by mouth 2 times daily    RIZATRIPTAN (MAXALT) 10 MG TABLET    Take 1 tablet by mouth once as needed for Migraine May repeat in 2 hours if needed         ALLERGIES     Patient has no known allergies.     FAMILYHISTORY       Family History   Problem Relation Age of Onset    Diabetes Mother     Hypertension Mother     High Cholesterol Mother     Stroke Mother     Cancer Mother     No Known Problems Paternal Grandfather         lung issues           SOCIAL HISTORY       Social History     Tobacco Use    Smoking status: Former Smoker     Packs/day: 0.50     Years: 35.00     Pack years: 17.50     Types: Cigarettes     Quit date: 2018     Years since quittin.6    Smokeless tobacco: Former User    Tobacco comment: 5/13/15 has not smoked since hospitalization -    Substance Use Topics    Alcohol use: No     Alcohol/week: 0.0 standard drinks    Drug use: No       SCREENINGS    [place] Coma Scale  Eye Opening: Spontaneous  Best Verbal Response: Oriented  Best Motor Response: Obeys commands  [place] Coma Scale Score: 15        PHYSICAL EXAM    (up to 7 for level 4, 8 or more for level 5)     ED Triage Vitals [03/01/21 1040]   BP Temp Temp Source Pulse Resp SpO2 Height Weight   (!) 190/83 99.1 °F (37.3 °C) Oral 99 16 100 % 5' (1.524 m) 123 lb 3.8 oz (55.9 kg)       Physical Exam  Vitals signs and nursing note reviewed. Constitutional:       General: She is not in acute distress. Appearance: Normal appearance. She is not ill-appearing. HENT:      Head: Normocephalic and atraumatic. Right Ear: External ear normal.      Left Ear: External ear normal.      Nose: Nose normal.      Mouth/Throat:      Mouth: Mucous membranes are moist.      Pharynx: Oropharynx is clear. Eyes:      General:         Right eye: No discharge. Left eye: No discharge. Extraocular Movements: Extraocular movements intact. Pupils: Pupils are equal, round, and reactive to light. Neck:      Musculoskeletal: Normal range of motion and neck supple. Cardiovascular:      Rate and Rhythm: Normal rate and regular rhythm. Pulses: Normal pulses. Heart sounds: Normal heart sounds. Pulmonary:      Effort: Pulmonary effort is normal. No respiratory distress. Abdominal:      General: Bowel sounds are normal.      Palpations: Abdomen is soft. Tenderness: There is no abdominal tenderness. Musculoskeletal: Normal range of motion. Skin:     General: Skin is warm and dry. Capillary Refill: Capillary refill takes less than 2 seconds. Neurological:      General: No focal deficit present. Mental Status: She is alert and oriented to person, place, and time.    Psychiatric:         Mood and Affect: Mood normal.         Behavior: Behavior normal.         DIAGNOSTIC RESULTS   LABS:    Labs Reviewed   CBC WITH AUTO DIFFERENTIAL - Abnormal; Notable for the following components:       Result Value    RBC 3.04 (*)     Hemoglobin 9.7 (*)     Hematocrit 29.7 (*)     All other components within normal limits    Narrative:     Performed at:  [facility]  [address] [facility] [address] [place] [address]   Phone [phone]   COMPREHENSIVE METABOLIC PANEL W/ REFLEX TO MG FOR LOW K - Abnormal; Notable for the following components:    Glucose 138 (*)     CREATININE 1.3 (*)     GFR Non- 41 (*)     GFR African American 50 (*)     Albumin/Globulin Ratio 1.0 (*)     ALT 7 (*)     All other components within normal limits    Narrative:     Performed at:  [facility]  [address] [place] [address]   Phone [phone]   APTT - Abnormal; Notable for the following components:    aPTT 38.7 (*)     All other components within normal limits    Narrative:     Performed at:  [facility]  [address] [facility] [address] [place] [address]   Phone [phone]   TROPONIN    Narrative:     Performed at:  [facility]  [address] [place] [address]   Phone [phone]   PROTIME-INR    Narrative:     Performed at:  [facility]  [address] [facility] [address] [place] [address]   Phone [phone]   BRAIN NATRIURETIC PEPTIDE       All other labs were within normal range or not returned as of this dictation. EKG: All EKG's are interpreted by the Emergency Department Physician in the absence of a cardiologist.  Please see their note for interpretation of EKG. RADIOLOGY:   Non-plain film images such as CT, Ultrasound and MRI are read by the radiologist. Plain radiographic images are visualized and preliminarily interpreted by the ED Provider with the below findings:        Interpretation per the Radiologist below, if available at the time of this note:    XR CHEST PORTABLE   Final Result   No acute process.          CT HEAD WO CONTRAST N/A    CONSULTS:  IP CONSULT TO HOSPITALIST      EMERGENCY DEPARTMENT COURSE and DIFFERENTIAL DIAGNOSIS/MDM:   Vitals:    Vitals:    03/01/21 1301 03/01/21 1317 03/01/21 1331 03/01/21 1347   BP: (!) 162/64 (!) 155/68 (!) 165/67 (!) 151/57   Pulse: 62 67 67 67   Resp: 15 18 8 14   Temp:       TempSrc:       SpO2: 100% 100% 100% 100%   Weight:       Height:           Patient was given the following medications:  Medications   labetalol (NORMODYNE;TRANDATE) injection 10 mg (10 mg Intravenous Given 3/1/21 1208)   aspirin chewable tablet 324 mg (324 mg Oral Given 3/1/21 1353)       Nursing notes reviewed. This is a 61-year-old -American female allergies not known multiple comorbid's including coronary artery disease, atrial fibrillation, diabetes, hyperlipidemia, and hypertension who presents for evaluation of chest pain and nausea. Patient states that over the last 4 to 5 days she has had to take at least 2 nitroglycerin tablets daily. She reports little relief from this. Patient was seen at her PCP office today for a scheduled visit and reported the symptoms to her PCP who advised her to come to the emergency department for admission. Patient also concerned with her blood pressure being elevated for the past 7 to 10 days. She states that she is compliant with her prescribed medications. She also reports headache. CT head is negative. EKG is negative for acute ST elevation. Laboratory studies have been unremarkable. Patient given IV labetalol which did improve her blood pressure. BP now 150/50. Patient denies CP at present. Patient had a negative stress test in 2019. At this time there is no evidence of any life-threatening or emergent conditions requiring immediate intervention. Low suspicion for PE, MI, or aortic dissection. Given patient's multiple comorbid's and presenting complaint I do feel that patient will benefit from admission for further evaluation and treatment.   He is in agreement with plan of care. 13:36-patient admitted under hospitalist service, Dr. Sophia Faria FINAL IMPRESSION      1. Chest pain, unspecified type    2. Poor high blood pressure control          DISPOSITION/PLAN   DISPOSITION Admitted 03/01/2021 02:02:16 PM      PATIENT REFERREDTO:  No follow-up provider specified.     DISCHARGE MEDICATIONS:  New Prescriptions    No medications on file       DISCONTINUED MEDICATIONS:  Discontinued Medications    BLOOD GLUCOSE MONITORING SUPPL (TRUE METRIX METER) W/DEVICE KIT    1 kit by Does not apply route 2 times daily    INSULIN GLARGINE (BASAGLAR KWIKPEN) 100 UNIT/ML INJECTION PEN    Inject 40 Units into the skin nightly    LANCET DEVICES (SIMPLE DIAGNOSTICS LANCING DEV) MISC    USE WITH LANCETS TO TEST BLOOD GLUCOSE    NEBULIZERS (COMPRESSOR/NEBULIZER) MISC    1 Device by Does not apply route 4 times daily as needed (SOB / Wheezing)    PHARMACIST CHOICE LANCETS MISC    USE TO TEST BLOOD GLUCOSE THREE TIMES DAILY    TOPIRAMATE (TOPAMAX) 50 MG TABLET    Take 1 tablet po nightly for a week then increase to 1 tablet po BID    ULTICARE ALCOHOL SWABS 70 % PADS    USE TO TEST BLOOD GLUCOSE THREE TIMES DAILY    UNIFINE PENTIPS 31G X 8 MM MISC    USE WITH insulin pens              (Please note that portions of this note were completed with a voice recognition program.  Efforts were made to edit the dictations but occasionally words are mis-transcribed.)    CYRUS Panchal CNP (electronically signed)           CYRUS Panchal CNP  03/01/21 2976

## 2021-03-01 NOTE — ED PROVIDER NOTES
EKG: Normal sinus rhythm, rate of 90, nonspecific ST-T wave changes. Rhythm strip shows sinus rhythm with a rate of 90, NH interval of under 34 ms, QRS 90 ms with no other ectopy as interpreted by me. Compared to 1/31/2021, no significant change noted.       Dalila Rouse MD  03/01/21 9498

## 2021-03-01 NOTE — PROGRESS NOTES
Subjective     CC:   Chief Complaint   Patient presents with    Spasms     over 1 week bilaterally    Nausea    Other     states that she is always cold    Headache       HPI    Tianna Naqvi is a 59 yo female, patient of Dr Froilan Whittaker. Visit today for multiple concerns. Nausea: Has chronic nausea. Reports it has been lasting longer it usually does. About a month ago she had some vomiting - clear liquid. Today she felt so nauseous when she woke up that she didn't have much of an appetite. Denies abdominal pain, diarrhea. Has PRN Zofran - has been out for several days - which helps some. She does have history of coffee ground emesis with recent EGD (Novemver) without an obvious bleeding source. Reports she previously saw GI, about a year ago, for her nausea. Their note from 4/2020 reviewed which does not mention nausea, however was evaluated for her diarrhea. Headache: Follows with [facility]-last saw 4/2020. She tells me she was told she had a stroke and the headaches were likely to be chronic from this. She was advised to trial TENS unit, however this is not an affordable option for her. Also sees pain management. Topamax - no longer taking, didn't like the way it made her feel. Headaches are in her right ear and go down into her neck. She is currently on monthly Aimovig injections. She reports her current headache is the same as her chronic headache - denies changes in location. Reports her headache was more severe than usual the other day. Feels cold all the time. Spasms - located under both ribs. Worse when she is walking or takes deep breaths. This started a few weeks ago. Has been getting worse since it began. The pain is intermittent. Denies associated SOB. Has been having chest pain for last 3 days - using nitro 2-3 times per day since then. Located in center of chest, described as achy. Lasts for 2-3 minutes.  Worse when she is active - becomes severe - and gets better with rest. Nitroglycerin resolves the pain. Has h/o CAD - from last cardiology OV note she had PCI at [facility] 11/2020, PCI 10/2019, 7/2018. She is currently on ASA 81 mg, plavix. She tells me the pain is similar to prior episodes before her interventions. She denies having pain presently during our visit. BP elevated today. Did take her medications today, about an hour or so ago. Review of Systems   Constitutional: Negative for chills and fever. Respiratory: Negative for shortness of breath. Cardiovascular: Positive for chest pain. Gastrointestinal: Positive for nausea. Negative for diarrhea and vomiting. Endocrine: Positive for cold intolerance. Neurological: Positive for headaches. Objective   Vitals:    03/01/21 0921   BP: (!) 170/77   Pulse: 85   Temp: 97.3 °F (36.3 °C)   TempSrc: Temporal   Weight: 123 lb (55.8 kg)   Height: 5' (1.524 m)     Body mass index is 24.02 kg/m². Wt Readings from Last 3 Encounters:   03/01/21 123 lb (55.8 kg)   01/31/21 118 lb 2.7 oz (53.6 kg)   01/28/21 123 lb 3.2 oz (55.9 kg)     BP Readings from Last 3 Encounters:   03/01/21 (!) 170/77   01/31/21 (!) 164/74   01/28/21 138/78      Physical Exam  Constitutional:       Appearance: Normal appearance. Cardiovascular:      Rate and Rhythm: Normal rate and regular rhythm. Heart sounds: Normal heart sounds. Pulmonary:      Effort: Pulmonary effort is normal. No respiratory distress. Neurological:      Mental Status: She is alert. Psychiatric:         Behavior: Behavior normal.         Thought Content: Thought content normal.         Judgment: Judgment normal.          Diagnosis Orders   1. Chest pain, unspecified type     2. Chronic nausea  JANNA - Ada Hodge MD, Gastroenterology, [facility]    CBC WITH AUTO DIFFERENTIAL   3. Cold intolerance  TSH with Reflex    CBC WITH AUTO DIFFERENTIAL   4. Essential hypertension     5.  Headache, chronic migraine without aura, intractable, with status             Plan 1. Chest pain, unspecified type: Patient with complaints of chest pain for last 3 days relieved with nitroglycerin, has reported that she is taking 2-3 nitroglycerin daily for the last 3 days. Pain is similar nature to her prior cardiac pain prior to having PCI. Denies chest pain during our visit. She was advised that she needs to go to the ER immediately. Declines EMS transport, daughter is here with her reports she will drive her directly to the ER. 2. Chronic nausea: Managed with as needed Zofran. Has worsened lately, could be related to #1. Patient to ER. Advised her to follow-up with GI for her chronic nausea. - AFL - Staci Puente MD, Gastroenterology, [facility]    3. Cold intolerance  - TSH with Reflex; Future  - CBC WITH AUTO DIFFERENTIAL; Future    4. Essential hypertension: Uncontrolled. Endorses compliance with medications. Patient to ER for #1.    5. Headache, chronic migraine without aura, intractable, with status: Advised to follow-up with neurology. Did have worsening of headache the other day, is back to her normal headaches now. Increased nitroglycerin use may be attributing to her worsened headache. To ER for #1. No follow-ups on file. OR sooner with questions, concerns, worsening symptoms      -Patient verbalized understanding and agreement to plan. Please note that this chart was generated using dragon dictation software. Although every effort was made to ensure the accuracy of this automated transcription, some errors in transcription may have occurred.

## 2021-03-01 NOTE — PROGRESS NOTES
Medication Reconciliation    List of medications patient is currently taking is complete. Source of information: 1. Conversation with patient at bedside                                      2. EPIC records      Allergies  Patient has no known allergies. Notes regarding home medications:   1. Patient received the majority of her morning home medications today as outlined on the med-rec. 2. Patient no longer takes Topamax secondary to side effects.     Jennifer Bañuelos, PharmD, BCPS  3/1/2021 12:01 PM

## 2021-03-02 LAB
ANION GAP SERPL CALCULATED.3IONS-SCNC: 14 MMOL/L (ref 3–16)
BASOPHILS ABSOLUTE: 0 K/UL (ref 0–0.2)
BASOPHILS RELATIVE PERCENT: 0.4 %
BUN BLDV-MCNC: 25 MG/DL (ref 7–20)
CALCIUM SERPL-MCNC: 9.2 MG/DL (ref 8.3–10.6)
CHLORIDE BLD-SCNC: 109 MMOL/L (ref 99–110)
CO2: 20 MMOL/L (ref 21–32)
CREAT SERPL-MCNC: 1.3 MG/DL (ref 0.6–1.2)
EKG ATRIAL RATE: 90 BPM
EKG DIAGNOSIS: NORMAL
EKG P AXIS: 74 DEGREES
EKG P-R INTERVAL: 134 MS
EKG Q-T INTERVAL: 380 MS
EKG QRS DURATION: 90 MS
EKG QTC CALCULATION (BAZETT): 464 MS
EKG R AXIS: 29 DEGREES
EKG T AXIS: 131 DEGREES
EKG VENTRICULAR RATE: 90 BPM
EOSINOPHILS ABSOLUTE: 0.1 K/UL (ref 0–0.6)
EOSINOPHILS RELATIVE PERCENT: 2.4 %
GFR AFRICAN AMERICAN: 50
GFR NON-AFRICAN AMERICAN: 41
GLUCOSE BLD-MCNC: 100 MG/DL (ref 70–99)
GLUCOSE BLD-MCNC: 60 MG/DL (ref 70–99)
GLUCOSE BLD-MCNC: 67 MG/DL (ref 70–99)
GLUCOSE BLD-MCNC: 79 MG/DL (ref 70–99)
GLUCOSE BLD-MCNC: 88 MG/DL (ref 70–99)
GLUCOSE BLD-MCNC: 93 MG/DL (ref 70–99)
HCT VFR BLD CALC: 31.4 % (ref 36–48)
HEMOGLOBIN: 10.4 G/DL (ref 12–16)
LYMPHOCYTES ABSOLUTE: 0.9 K/UL (ref 1–5.1)
LYMPHOCYTES RELATIVE PERCENT: 20.1 %
MCH RBC QN AUTO: 32.6 PG (ref 26–34)
MCHC RBC AUTO-ENTMCNC: 32.9 G/DL (ref 31–36)
MCV RBC AUTO: 98.9 FL (ref 80–100)
MONOCYTES ABSOLUTE: 0.3 K/UL (ref 0–1.3)
MONOCYTES RELATIVE PERCENT: 6.7 %
NEUTROPHILS ABSOLUTE: 3.3 K/UL (ref 1.7–7.7)
NEUTROPHILS RELATIVE PERCENT: 70.4 %
PDW BLD-RTO: 14.7 % (ref 12.4–15.4)
PERFORMED ON: ABNORMAL
PERFORMED ON: ABNORMAL
PERFORMED ON: NORMAL
PLATELET # BLD: 213 K/UL (ref 135–450)
PMV BLD AUTO: 8.4 FL (ref 5–10.5)
POTASSIUM REFLEX MAGNESIUM: 4 MMOL/L (ref 3.5–5.1)
RBC # BLD: 3.18 M/UL (ref 4–5.2)
SODIUM BLD-SCNC: 143 MMOL/L (ref 136–145)
TROPONIN: <0.01 NG/ML
WBC # BLD: 4.7 K/UL (ref 4–11)

## 2021-03-02 PROCEDURE — 36415 COLL VENOUS BLD VENIPUNCTURE: CPT

## 2021-03-02 PROCEDURE — 94760 N-INVAS EAR/PLS OXIMETRY 1: CPT

## 2021-03-02 PROCEDURE — 6370000000 HC RX 637 (ALT 250 FOR IP): Performed by: FAMILY MEDICINE

## 2021-03-02 PROCEDURE — 84484 ASSAY OF TROPONIN QUANT: CPT

## 2021-03-02 PROCEDURE — 96376 TX/PRO/DX INJ SAME DRUG ADON: CPT

## 2021-03-02 PROCEDURE — 94640 AIRWAY INHALATION TREATMENT: CPT

## 2021-03-02 PROCEDURE — G0378 HOSPITAL OBSERVATION PER HR: HCPCS

## 2021-03-02 PROCEDURE — 93010 ELECTROCARDIOGRAM REPORT: CPT | Performed by: INTERNAL MEDICINE

## 2021-03-02 PROCEDURE — 6360000002 HC RX W HCPCS: Performed by: FAMILY MEDICINE

## 2021-03-02 PROCEDURE — 80048 BASIC METABOLIC PNL TOTAL CA: CPT

## 2021-03-02 PROCEDURE — 2580000003 HC RX 258: Performed by: FAMILY MEDICINE

## 2021-03-02 PROCEDURE — 96372 THER/PROPH/DIAG INJ SC/IM: CPT

## 2021-03-02 PROCEDURE — 85025 COMPLETE CBC W/AUTO DIFF WBC: CPT

## 2021-03-02 RX ORDER — INSULIN GLARGINE 100 [IU]/ML
30 INJECTION, SOLUTION SUBCUTANEOUS NIGHTLY
Status: DISCONTINUED | OUTPATIENT
Start: 2021-03-02 | End: 2021-03-03

## 2021-03-02 RX ORDER — DEXTROSE MONOHYDRATE 25 G/50ML
12.5 INJECTION, SOLUTION INTRAVENOUS PRN
Status: DISCONTINUED | OUTPATIENT
Start: 2021-03-02 | End: 2021-03-05 | Stop reason: HOSPADM

## 2021-03-02 RX ORDER — INSULIN GLARGINE 100 [IU]/ML
30 INJECTION, SOLUTION SUBCUTANEOUS 2 TIMES DAILY
Status: DISCONTINUED | OUTPATIENT
Start: 2021-03-02 | End: 2021-03-02

## 2021-03-02 RX ORDER — DEXTROSE MONOHYDRATE 50 MG/ML
100 INJECTION, SOLUTION INTRAVENOUS PRN
Status: DISCONTINUED | OUTPATIENT
Start: 2021-03-02 | End: 2021-03-05 | Stop reason: HOSPADM

## 2021-03-02 RX ORDER — NICOTINE POLACRILEX 4 MG
15 LOZENGE BUCCAL PRN
Status: DISCONTINUED | OUTPATIENT
Start: 2021-03-02 | End: 2021-03-05 | Stop reason: HOSPADM

## 2021-03-02 RX ADMIN — AMLODIPINE BESYLATE 5 MG: 5 TABLET ORAL at 09:12

## 2021-03-02 RX ADMIN — SODIUM CHLORIDE, PRESERVATIVE FREE 10 ML: 5 INJECTION INTRAVENOUS at 09:09

## 2021-03-02 RX ADMIN — ONDANSETRON 4 MG: 2 INJECTION INTRAMUSCULAR; INTRAVENOUS at 09:08

## 2021-03-02 RX ADMIN — OXYCODONE HYDROCHLORIDE AND ACETAMINOPHEN 1 TABLET: 7.5; 325 TABLET ORAL at 06:35

## 2021-03-02 RX ADMIN — HYDRALAZINE HYDROCHLORIDE 50 MG: 50 TABLET, FILM COATED ORAL at 20:04

## 2021-03-02 RX ADMIN — BUDESONIDE AND FORMOTEROL FUMARATE DIHYDRATE 2 PUFF: 160; 4.5 AEROSOL RESPIRATORY (INHALATION) at 08:12

## 2021-03-02 RX ADMIN — ATORVASTATIN CALCIUM 80 MG: 80 TABLET, FILM COATED ORAL at 20:04

## 2021-03-02 RX ADMIN — METOPROLOL SUCCINATE 25 MG: 25 TABLET, EXTENDED RELEASE ORAL at 18:44

## 2021-03-02 RX ADMIN — CLOPIDOGREL BISULFATE 75 MG: 75 TABLET ORAL at 09:12

## 2021-03-02 RX ADMIN — AMLODIPINE BESYLATE 5 MG: 5 TABLET ORAL at 20:04

## 2021-03-02 RX ADMIN — ACETAMINOPHEN 650 MG: 325 TABLET ORAL at 23:38

## 2021-03-02 RX ADMIN — ENOXAPARIN SODIUM 40 MG: 40 INJECTION SUBCUTANEOUS at 09:12

## 2021-03-02 RX ADMIN — ONDANSETRON 4 MG: 2 INJECTION INTRAMUSCULAR; INTRAVENOUS at 18:44

## 2021-03-02 RX ADMIN — OXYCODONE HYDROCHLORIDE AND ACETAMINOPHEN 1 TABLET: 7.5; 325 TABLET ORAL at 13:13

## 2021-03-02 RX ADMIN — METOPROLOL SUCCINATE 25 MG: 25 TABLET, EXTENDED RELEASE ORAL at 09:11

## 2021-03-02 RX ADMIN — ASPIRIN 81 MG: 81 TABLET, CHEWABLE ORAL at 09:12

## 2021-03-02 RX ADMIN — DULOXETINE HYDROCHLORIDE 60 MG: 60 CAPSULE, DELAYED RELEASE ORAL at 20:04

## 2021-03-02 RX ADMIN — SODIUM CHLORIDE, PRESERVATIVE FREE 10 ML: 5 INJECTION INTRAVENOUS at 20:05

## 2021-03-02 RX ADMIN — QUETIAPINE FUMARATE 25 MG: 25 TABLET ORAL at 20:04

## 2021-03-02 RX ADMIN — HYDRALAZINE HYDROCHLORIDE 50 MG: 50 TABLET, FILM COATED ORAL at 09:11

## 2021-03-02 RX ADMIN — OXYCODONE HYDROCHLORIDE AND ACETAMINOPHEN 1 TABLET: 7.5; 325 TABLET ORAL at 20:04

## 2021-03-02 ASSESSMENT — PAIN DESCRIPTION - PAIN TYPE: TYPE: ACUTE PAIN

## 2021-03-02 ASSESSMENT — PAIN SCALES - GENERAL
PAINLEVEL_OUTOF10: 10
PAINLEVEL_OUTOF10: 7
PAINLEVEL_OUTOF10: 0
PAINLEVEL_OUTOF10: 8
PAINLEVEL_OUTOF10: 6
PAINLEVEL_OUTOF10: 8

## 2021-03-02 ASSESSMENT — PAIN SCALES - WONG BAKER
WONGBAKER_NUMERICALRESPONSE: 0

## 2021-03-02 ASSESSMENT — PAIN DESCRIPTION - FREQUENCY: FREQUENCY: CONTINUOUS

## 2021-03-02 ASSESSMENT — PAIN DESCRIPTION - PROGRESSION: CLINICAL_PROGRESSION: NOT CHANGED

## 2021-03-02 ASSESSMENT — PAIN DESCRIPTION - ONSET: ONSET: ON-GOING

## 2021-03-02 ASSESSMENT — PAIN DESCRIPTION - DESCRIPTORS: DESCRIPTORS: PRESSURE

## 2021-03-02 NOTE — H&P
ANGIOPLASTY WITH STENT PLACEMENT      CORONARY ANGIOPLASTY WITH STENT PLACEMENT      HYSTERECTOMY      JOINT REPLACEMENT Right     KNEE ARTHROSCOPY Right     KNEE SURGERY      right knee replacement    PTCA  10/2019    LAD and RCA inrtervention    TUNNELED VENOUS PORT PLACEMENT      left thigh. SMART PORT    UPPER GASTROINTESTINAL ENDOSCOPY N/A 7/6/2020    EGD DIAGNOSTIC ONLY performed by Kar Bella MD at [facility]. [address]         Medications Prior to Admission:    Prior to Admission medications    Medication Sig Start Date End Date Taking? Authorizing Provider   insulin glargine (BASAGLAR KWIKPEN) 100 UNIT/ML injection pen Inject 60 Units into the skin 2 times daily   Yes Historical Provider, MD   oxyCODONE-acetaminophen (PERCOCET) 7.5-325 MG per tablet Take 1 tablet by mouth every 6 hours as needed for Pain (max 3-4 day) for up to 28 days.  2/19/21 3/19/21 Yes Beatriz Shah MD   metoprolol succinate (TOPROL XL) 50 MG extended release tablet TAKE 0.5 TABLETS BY MOUTH 2 TIMES DAILY (WITH MEALS) 2/5/21  Yes CYRUS Crowell   omeprazole (PRILOSEC) 20 MG delayed release capsule TAKE 1 CAPSULE BY MOUTH DAILY 2/5/21  Yes CYRUS Crowell   rizatriptan (MAXALT) 10 MG tablet Take 1 tablet by mouth once as needed for Migraine May repeat in 2 hours if needed 2/2/21 3/1/21 Yes Beatriz Shah MD   AIMOVIG 70 MG/ML SOAJ INJECT 140 MLS INTO THE SKIN EVERY 30 DAYS 2/2/21  Yes Beatriz Shah MD   QUEtiapine (SEROQUEL) 25 MG tablet TAKE 1-2 TABLETS BY MOUTH NIGHTLY 2/2/21  Yes Beatriz Shah MD   DULoxetine (CYMBALTA) 60 MG extended release capsule TAKE 1 CAPSULE BY MOUTH DAILY 2/2/21  Yes Beatriz Shah MD   aspirin 81 MG chewable tablet Take 1 tablet by mouth daily 12/24/20  Yes Zarina Stafford MD   dicyclomine (BENTYL) 20 MG tablet Take 20 mg by mouth 4 times daily (before meals and nightly)  12/9/20  Yes Historical Provider, MD   nitroGLYCERIN (NITROSTAT) 0.4 MG SL tablet Place 1 tablet under the tongue every 5 minutes as needed for Chest pain up to max of 3 total doses. If no relief after 1 dose, call 911. 12/16/20  Yes Jewel Sood MD   Nutritional Supplements (ENSURE) LIQD Take 1 Can by mouth 3 times daily as needed (nutrition) 12/16/20  Yes Jewel Sood MD   hydrALAZINE (APRESOLINE) 50 MG tablet Take 1 tablet by mouth 2 times daily 10/1/20  Yes Jewel Sood MD   atorvastatin (LIPITOR) 80 MG tablet Take 1 tablet by mouth nightly 10/1/20  Yes Jewel Sood MD   amLODIPine (NORVASC) 5 MG tablet Take 1 tablet by mouth 2 times daily 10/1/20  Yes Jewel Sood MD   lidocaine (XYLOCAINE) 5 % ointment Apply topically as needed. 10/1/20  Yes Jewel Sood MD   blood glucose test strips (TRUE METRIX BLOOD GLUCOSE TEST) strip 1 each by Does not apply route 2 times daily 9/25/20  Yes Jewel Sood MD   ondansetron (ZOFRAN) 4 MG tablet Take 1 tablet by mouth 3 times daily as needed for Nausea or Vomiting 9/23/20  Yes Jewel Sood MD   clopidogrel (PLAVIX) 75 MG tablet TAKE 1 TABLET BY MOUTH DA JULIEN 9/17/20  Yes Jewel Sood MD   albuterol (PROVENTIL) (2.5 MG/3ML) 0.083% nebulizer solution Take 3 mLs by nebulization every 4 hours as needed for Wheezing 3/25/20  Yes Jewel Sood MD   budesonide-formoterol (SYMBICORT) 160-4.5 MCG/ACT AERO Inhale 2 puffs into the lungs daily 12/6/19  Yes Jewel Sood MD   albuterol sulfate HFA (VENTOLIN HFA) 108 (90 Base) MCG/ACT inhaler Inhale 2 puffs into the lungs every 4 hours as needed for Wheezing or Shortness of Breath 12/6/19  Yes Jewel Sood MD   ranolazine (RANEXA) 1000 MG extended release tablet Take 1 tablet by mouth 2 times daily 9/10/19  Yes Jewel Sood MD       Allergies:  Patient has no known allergies. Social History:  The patient currently lives at home    TOBACCO:   reports that she quit smoking about 2 years ago. Her smoking use included cigarettes.  She has a 17.50 pack-year smoking history. She has quit using smokeless tobacco.  ETOH:   reports no history of alcohol use. Family History:  Reviewed in detail and negative for DM, Early CAD, Cancer, CVA. Positive as follows:        Problem Relation Age of Onset    Diabetes Mother     Hypertension Mother     High Cholesterol Mother     Stroke Mother     Cancer Mother     No Known Problems Paternal Grandfather         lung issues        REVIEW OF SYSTEMS:   Positive for chest pressure and as noted in the HPI. All other systems reviewed and negative. PHYSICAL EXAM:    BP (!) 160/78   Pulse 63   Temp 97.9 °F (36.6 °C) (Oral)   Resp 18   Ht 5' (1.524 m)   Wt 123 lb 3.8 oz (55.9 kg)   SpO2 100%   BMI 24.07 kg/m²     General appearance: No apparent distress appears stated age and cooperative. HEENT Normal cephalic, atraumatic without obvious deformity. Pupils equal, round, and reactive to light. Extra ocular muscles intact. Conjunctivae/corneas clear. Neck: Supple, No jugular venous distention/bruits. Trachea midline without thyromegaly or adenopathy with full range of motion. Lungs: Clear to auscultation, bilaterally without Rales/Wheezes/Rhonchi with good respiratory effort. Heart: Regular rate and rhythm with Normal S1/S2 without murmurs, rubs or gallops, point of maximum impulse non-displaced  Abdomen: Soft, non-tender or non-distended without rigidity or guarding and positive bowel sounds all four quadrants. Extremities: No clubbing, cyanosis, or edema bilaterally. Full range of motion without deformity and normal gait intact. Skin: Skin color, texture, turgor normal.  No rashes or lesions. Neurologic: Alert and oriented X 3, neurovascularly intact with sensory/motor intact upper extremities/lower extremities, bilaterally. Cranial nerves: II-XII intact, grossly non-focal.  Mental status: Alert, oriented, thought content appropriate.   Capillary Refill: Acceptable  < 3 seconds  Peripheral Pulses: +3 Easily felt, not easily obliterated with pressure        CBC   Recent Labs     03/01/21  1140   WBC 4.8   HGB 9.7*   HCT 29.7*         RENAL  Recent Labs     03/01/21  1140      K 3.8      CO2 26   BUN 20   CREATININE 1.3*     LFT'S  Recent Labs     03/01/21  1140   AST 15   ALT 7*   BILITOT <0.2   ALKPHOS 111     COAG  Recent Labs     03/01/21  1140   INR 1.02     CARDIAC ENZYMES  Recent Labs     03/01/21  1140   TROPONINI <0.01       U/A:    Lab Results   Component Value Date    COLORU YELLOW 10/27/2020    WBCUA 7 10/27/2020    RBCUA 229 10/27/2020    MUCUS 1+ 05/23/2013    BACTERIA RARE 08/29/2019    CLARITYU CLOUDY 10/27/2020    SPECGRAV 1.022 10/27/2020    LEUKOCYTESUR TRACE 10/27/2020    BLOODU LARGE 10/27/2020    GLUCOSEU 250 10/27/2020    GLUCOSEU NEGATIVE 05/14/2012    AMORPHOUS Rare 12/11/2014       ABG    Lab Results   Component Value Date    HNX1JSA 16.6 07/28/2019    BEART -7.4 07/28/2019    J1NIIZPE 98.3 07/28/2019    PHART 7.367 07/28/2019    THGBART 10.9 11/16/2011    DKG2FDE 29.6 07/28/2019    PO2ART 97.4 07/28/2019    WCQ6IZK 17.5 07/28/2019           Active Hospital Problems    Diagnosis Date Noted    Chest pain [R07.9] 12/22/2020         PHYSICIANS CERTIFICATION:    I certify that James Coleman is expected to be hospitalized for less than 2 midnights based on the following assessment and plan:      ASSESSMENT/PLAN:    ACS r/o:  - h/o PCI most recently in 2018  - telemetry, troponin trend  - stress test in Dec 2019 unremarkable  - hold off on further stress testing --> consult cardiology  - cont plavix and ASA, statin    H/o HTN;  Cont home medications, amlodipine, hydralazine  H/o DM2:  Cont home lantus  Mood/pain d/o:  Cont cymbalta, seroquel    DVT Prophylaxis: Lovenox  Diet: DIET CARDIAC;  Code Status: Full Code  PT/OT Eval Status: N/A    Dispo - Michelle Calle MD    Thank you Helena Slade MD for the opportunity to be involved in this patient's care.  If you have any questions or concerns please feel free to contact me at [phone].

## 2021-03-02 NOTE — PROGRESS NOTES
4 Eyes Skin Assessment     NAME:  Chika Leyva  YOB: 1955  MEDICAL RECORD NUMBER:  [id number]    The patient is being assess for  Admission    I agree that 2 RN's have performed a thorough Head to Toe Skin Assessment on the patient. ALL assessment sites listed below have been assessed. Areas assessed by both nurses:    Head, Face, Ears, Shoulders, Back, Chest, Arms, Elbows, Hands, Sacrum. Buttock, Coccyx, Ischium and Legs. Feet and Heels        Does the Patient have a Wound?  No noted wound(s)       Rakan Prevention initiated:  Yes   Wound Care Orders initiated:  NA    Pressure Injury (Stage 3,4, Unstageable, DTI, NWPT, and Complex wounds) if present place consult order under [de-identified] NA    New and Established Ostomies if present place consult order under : NA      Nurse 1 eSignature: Electronically signed by Ankit Hernandez RN on 3/1/21 at 8:47 PM EST    **SHARE this note so that the co-signing nurse is able to place an eSignature**    Nurse 2 eSignature: Electronically signed by Jennifer Brady RN on 3/2/21 at 6:43 AM EST

## 2021-03-02 NOTE — PLAN OF CARE
Problem: Pain:  Goal: Pain level will decrease  Description: Pain level will decrease  3/2/2021 1104 by Maya Kim RN  Outcome: Ongoing   Pain/Discomfort is being managed with PRN analgesics per MD orders (See MAR). Patient is able to express and rate pain using numerical scale.     Problem: Falls - Risk of:  Goal: Will remain free from falls  Description: Will remain free from falls  3/2/2021 1104 by Maya Kim RN  Outcome: Ongoing

## 2021-03-02 NOTE — CARE COORDINATION
[facility]  Diabetes Education   Progress Note       NAME:  [address] [place] [address] RECORD NUMBER:  [id number]  AGE: 60 y.o. GENDER: female  : 1955  TODAY'S DATE:  3/2/2021    Subjective   Reason for Diabetic Education Evaluation and Assessment: vijaya Engel agrees to meet with me for diabetes education. She reports that she omits her morning Lantus dose due to fears of hypoglycemia several times per week. She has not taken the morning dose for several day. She describes her medication taking as consistent for the bedtime time Lantus dose.         Visit Type: evaluation      Yumi Cornejo is a 60 y.o. female referred by:     [] Physician  [] Nursing  [x] Chart Review   [] Other:     PAST MEDICAL HISTORY        Diagnosis Date    Acid reflux     Anemia     Anxiety     Arthritis     Asthma     Atrial fibrillation ([address]     Blood transfusion reaction     CAD (coronary artery disease) 12/3/2012    Cerebral artery occlusion with cerebral infarction ([address]     CHF (congestive heart failure) (HCC)     Chronic kidney disease     40% kidney functiom    COPD (chronic obstructive pulmonary disease) ([address]     Depression     DM2 (diabetes mellitus, type 2) ([address]     Dysarthria     Fibromyalgia 2016    Headache(784.0) 2013    Hemisensory loss     Hyperlipidemia     Hypertension     IBS (irritable bowel syndrome)     Inferior vena cava occlusion (HCC)     Irritable bowel syndrome     Keratitis     MI, old     Neuropathy     Superior vena cava obstruction     Temporal arteritis ([address] 2014       PAST SURGICAL HISTORY    Past Surgical History:   Procedure Laterality Date    ABLATION OF DYSRHYTHMIC FOCUS      ARTERY BIOPSY Right 2014    RIGHT TEMPORAL ARTERY BIOPSY    CATARACT REMOVAL Bilateral     CHOLECYSTECTOMY      CORONARY ANGIOPLASTY WITH STENT PLACEMENT      CORONARY ANGIOPLASTY WITH STENT PLACEMENT      HYSTERECTOMY      JOINT REPLACEMENT Right     KNEE ARTHROSCOPY Right     KNEE SURGERY      right knee replacement    PTCA  10/2019    LAD and RCA inrtervention    TUNNELED VENOUS PORT PLACEMENT      left thigh.   SMART PORT    UPPER GASTROINTESTINAL ENDOSCOPY N/A 2020    EGD DIAGNOSTIC ONLY performed by Azeb Guadalupe [facility]    Family History   Problem Relation Age of Onset    Diabetes Mother     Hypertension Mother     High Cholesterol Mother     Stroke Mother     Cancer Mother     No Known Problems Paternal Grandfather         lung issues        SOCIAL HISTORY    Social History     Tobacco Use    Smoking status: Former Smoker     Packs/day: 0.50     Years: 35.00     Pack years: 17.50     Types: Cigarettes     Quit date: 2018     Years since quittin.6    Smokeless tobacco: Former User    Tobacco comment: 5/13/15 has not smoked since hospitalization -    Substance Use Topics    Alcohol use: No     Alcohol/week: 0.0 standard drinks    Drug use: No       ALLERGIES    No Known Allergies    MEDICATIONS     amLODIPine  5 mg Oral BID    aspirin  81 mg Oral Daily    atorvastatin  80 mg Oral Nightly    budesonide-formoterol  2 puff Inhalation Daily    clopidogrel  75 mg Oral Daily    DULoxetine  60 mg Oral Nightly    hydrALAZINE  50 mg Oral BID    insulin glargine  60 Units Subcutaneous BID    metoprolol succinate  25 mg Oral BID WC    QUEtiapine  25 mg Oral Nightly    sodium chloride flush  10 mL Intravenous 2 times per day    enoxaparin  40 mg Subcutaneous Daily       Objective        Patient Active Problem List   Diagnosis Code    HTN (hypertension) I10    Hyperlipidemia E78.5    CAD (coronary artery disease) I25.10    Palpitation R00.2    PVC (premature ventricular contraction) I49.3    Depression F32.9    Migraine with aura, intractable G43.119    Hemisensory loss R20.0    PTSD (post-traumatic stress disorder) F43.10    Insomnia G47.00  Snoring R06.83    Atypical chest pain R07.89    Dysarthria R47.1    Port-A-Cath in place Z95.828    Inferior vena cava occlusion ([facility]) I82.220    Cataract H26.9    Benign essential tremor G25.0    Tobacco use Z72.0    Chronic diastolic CHF (congestive heart failure), NYHA class 2 ([facility]) I50.32    COPD (chronic obstructive pulmonary disease) ([facility]) J44.9    Pulmonary edema J81.1    NSTEMI (non-ST elevated myocardial infarction) ([facility]) I21.4    Rotator cuff tendonitis M75.80    Essential hypertension I10    Fibromyalgia M79.7    Chronic pain syndrome G89.4    Headache, chronic migraine without aura, intractable, with status G43.711    Type 2 diabetes mellitus with diabetic chronic kidney disease ([facility]) E11.22    S/P right coronary artery (RCA) stent placement Z95.5    Irritable bowel syndrome K58.9    Giant cell arteritis ([facility]) M31.6    Gastro-esophageal reflux disease without esophagitis K21.9    A-fib ([facility]) I48.91    Abscess of groin, right L02.214    Precordial chest pain R07.2    Coronary artery disease involving native coronary artery of native heart with angina pectoris ([facility]) I25.119    COPD exacerbation ([facility]) J44.1    DM (diabetes mellitus), type 2, uncontrolled ([facility]) E11.65    Right knee pain M25.561    Chronic painful diabetic neuropathy ([facility]) E11.40    CAD in native artery I25.10    Dyspnea on exertion R06.00    Unstable angina ([facility]) I20.0    Sepsis ([facility]) A41.9    Acute respiratory failure with hypoxia ([facility]) J96.01    Generalized weakness R53.1    Reactive airway disease with wheezing with acute exacerbation J45. 0    Headache R51.9    DMII (diabetes mellitus, type 2) ([facility]) D05.2    Metabolic acidosis X42.4    Acute-on-chronic renal failure ([facility]) N17.9, N18.9    Age-related nuclear cataract, bilateral H25.13    Chronic prescription opiate use Z79.891    Coronary stent restenosis due to progression of disease T82.855A, I25.10    Current moderate episode of major depressive disorder ([facility] F32.1    Diabetic retinopathy of both eyes without macular edema associated with type 2 diabetes mellitus (HCC) E11.319    Hypertensive heart and chronic kidney disease with heart failure and stage 1 through stage 4 chronic kidney disease, or unspecified chronic kidney disease (HCC) I13.0    Hypertensive retinopathy, bilateral H35.033    Ischemic cardiomyopathy I25.5    Moderate episode of recurrent major depressive disorder ([facility]) F33.1    Nausea R11.0    Other age-related incipient cataract, bilateral H25.093    Presence of intraocular lens Z96.1    Punctate keratitis, bilateral H16.143    Regular astigmatism, bilateral H52.223    Palliative care encounter Z51.5    CKD (chronic kidney disease) stage 3, GFR 30-59 ml/min N18.30    Compression of brain ([facility]) G93.5    Thrombosis of superior vena cava, 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 109    Angina at rest [facility]) I20.8    Chest pain R07.9        /79   Pulse 65   Temp 97.9 °F (36.6 °C) (Oral)   Resp 18   Ht 5' (1.524 m)   Wt 123 lb 0.3 oz (55.8 kg)   SpO2 97%   BMI 24.03 kg/m²     HgBA1c:    Lab Results   Component Value Date    LABA1C 8.9 10/01/2020       Recent Labs     03/01/21  2030 03/02/21  0728 03/02/21  0816   POCGLU 140* 67* 79       BUN/Creatinine:    Lab Results   Component Value Date    BUN 25 03/02/2021    CREATININE 1.3 03/02/2021       Assessment        Diabetes Management and Education    Does the patient have a Primary Care Physician? Yes, Laura Segundo MD       Does the patient require new medication instruction? Yes  Reviewed basal insulin concepts.       Person responsible for administration of Insulin/Medication:        [x] Self     [] Caregiver       [] Spouse       [] Other Family Member   []  Other      Level of patient/caregiver understanding able to:       [x] Verbalized Understanding   [] Demonstrated Understanding       [] Teach Back       [] Needs Reinforcement     []  Other:        Does the patient/caregiver monitor Blood Glucoses? Yes  Reviewed glycemic control targets, testing frequency and when to call PCP. Level of patient/caregiver understanding able to:        [x] Verbalized Understanding   [] Demonstrated Understanding       [] Teach Back       [] Needs Reinforcement     []  Other:        Does the patient/caregiver follow a Meal Plan? No:   Recommend making water, unsweetened tea or coffee primary drinks. Reviewed importance of eating three meals per day and plate method for consistent carb intake. Reinforced low sodium eating. Level of patient/caregiver understanding able to:       [x] Verbalized Understanding   [] Demonstrated Understanding       [] Teach Back       [] Needs Reinforcement     []  Other:      Does the patient/caregiver understand S/S of Hypoglycemia? Yes. Describes some hypoglycemia unawareness. Reviewed symptoms, prevention and treatment. Level of patient/caregiver understanding able to:       [x] Verbalized Understanding   [] Demonstrated Understanding       [] Teach Back       [] Needs Reinforcement     [x]  Other:  Agrees to notify staff of any symptoms. Does the patient/caregiver understand S/S of Hyperglycemia? Yes       Plan        Ongoing diabetes education and blood glucose monitoring.       The following educational and support materials were provided:  · My contact information       · The Diabetes Education Program:  Planning Healthy Meals   · Academy of Nutrition and Dietetics handout - Carbohydrate Counting for People with Diabetes                                           Discharge Plan:  Discharge needs: no prescription needs identified        Teaching Time Diabetes Education:  40 minutes Electronically signed by Soraya Smith on 3/2/2021 at 9:49 AM

## 2021-03-02 NOTE — CARE COORDINATION
[facility]  Hypoglycemia Event and Prevention Plan      NAME: Mateo Odom  MEDICAL RECORD NUMBER:  [id number]  AGE: 60 y.o. GENDER: female  : 1955  EPISODE DATE:  3/2/2021     Data     Recent Labs     21  0728 21  0816   POCGLU 140* 67* 79       Medications  Scheduled Medications:   amLODIPine  5 mg Oral BID    aspirin  81 mg Oral Daily    atorvastatin  80 mg Oral Nightly    budesonide-formoterol  2 puff Inhalation Daily    clopidogrel  75 mg Oral Daily    DULoxetine  60 mg Oral Nightly    hydrALAZINE  50 mg Oral BID    insulin glargine  60 Units Subcutaneous BID    metoprolol succinate  25 mg Oral BID WC    QUEtiapine  25 mg Oral Nightly    sodium chloride flush  10 mL Intravenous 2 times per day    enoxaparin  40 mg Subcutaneous Daily       Diet  Current diet/supplement order: DIET CARDIAC; Recorded PO:   last meal in flowsheets      Action        Physician Notified of event: Yes   Adonis Arreguin MD    Recommend decreasing Lantus to 30 units. May need to add either prandial or DPP4 for post prandial hypoglycemia     POCT added at 0200.      Responce     Achieved POCT Blood Glucose greater than 70 mg/dl: Yes         Electronically signed by Cristino Geronimo RN on 3/2/2021 at 8:37 AM

## 2021-03-02 NOTE — PROGRESS NOTES
Pt resting in bed with respirations even and unlabored. C/O continued chest pressure. AM meds administered without issue. No needs at this time. Will monitor.

## 2021-03-02 NOTE — CARE COORDINATION
[address]  Ph:  [phone]    Soo Agarwal Sr.  Administrative Assist, Case Management  [phone]  Electronically signed by Soo Agarwal on 3/2/2021 at 10:18 AM

## 2021-03-02 NOTE — CARE COORDINATION
INITIAL CASE MANAGEMENT ASSESSMENT     Reviewed chart, met with patient to assess possible discharge needs. Explained Case Management role/services. SW interviewed patient via telephone today.     Living Situation: Patient reports that she resides alone in a [facility] apt-with an elevator. Prior to medical admission she had no issues getting in and out of the property.     ADLs: Prior to medical admission patient received assistance from a home health aide with cooking, cleaning , laundry and PRN bathing. She stated that she doesn't anticipate any needs at discharge.      DME: Prior to medical admission patient reports that she used a cane a life alert emergency response system. She stated that she doesn't anticipate any needs at discharge. PT/OT Recs: Since medical admission patient patient reports that she has been ambulating with assistance. We will request a PT/OT screening for safety prior to discharge. Active Services: Patient is active with [facility] and has a history with [facility] if there are needs. With [facility] - has [employer] and care aides 5 days/week meals on wheels.      Transportation: Prior to medical admission patient family transported patient to and from medical appointments and errands. They will likely transport patient home at discharge.      Medications: Patient receives medications from [facility]. At this time she reports no issues with access or affordability.      PCP: Dr Mana Donohue -[phone] (phone) and [phone] (fax number). Patient reports that her last appointment with this provider was yesterday.      PLAN/COMMENTS:   1) Monitor for therapy needs. 2) Discharge to home with family.      SW provided contact information for patient or family to call with any questions. SW will follow and assist as needed. Respectfully submitted,    Joslyn CUELLO, [facility]   [phone]    Electronically signed by ERICA Overton on 3/2/2021 at 10:18 AM

## 2021-03-03 LAB
ANION GAP SERPL CALCULATED.3IONS-SCNC: 13 MMOL/L (ref 3–16)
BASOPHILS ABSOLUTE: 0 K/UL (ref 0–0.2)
BASOPHILS RELATIVE PERCENT: 0.4 %
BUN BLDV-MCNC: 31 MG/DL (ref 7–20)
CALCIUM SERPL-MCNC: 9 MG/DL (ref 8.3–10.6)
CHLORIDE BLD-SCNC: 104 MMOL/L (ref 99–110)
CO2: 22 MMOL/L (ref 21–32)
CREAT SERPL-MCNC: 1.6 MG/DL (ref 0.6–1.2)
EOSINOPHILS ABSOLUTE: 0.1 K/UL (ref 0–0.6)
EOSINOPHILS RELATIVE PERCENT: 1.5 %
GFR AFRICAN AMERICAN: 39
GFR NON-AFRICAN AMERICAN: 32
GLUCOSE BLD-MCNC: 111 MG/DL (ref 70–99)
GLUCOSE BLD-MCNC: 130 MG/DL (ref 70–99)
GLUCOSE BLD-MCNC: 139 MG/DL (ref 70–99)
GLUCOSE BLD-MCNC: 158 MG/DL (ref 70–99)
GLUCOSE BLD-MCNC: 177 MG/DL (ref 70–99)
GLUCOSE BLD-MCNC: 189 MG/DL (ref 70–99)
GLUCOSE BLD-MCNC: 54 MG/DL (ref 70–99)
GLUCOSE BLD-MCNC: 66 MG/DL (ref 70–99)
HCT VFR BLD CALC: 31.1 % (ref 36–48)
HEMOGLOBIN: 10.2 G/DL (ref 12–16)
LYMPHOCYTES ABSOLUTE: 1.1 K/UL (ref 1–5.1)
LYMPHOCYTES RELATIVE PERCENT: 22.7 %
MCH RBC QN AUTO: 32.3 PG (ref 26–34)
MCHC RBC AUTO-ENTMCNC: 32.8 G/DL (ref 31–36)
MCV RBC AUTO: 98.4 FL (ref 80–100)
MONOCYTES ABSOLUTE: 0.3 K/UL (ref 0–1.3)
MONOCYTES RELATIVE PERCENT: 6.6 %
NEUTROPHILS ABSOLUTE: 3.2 K/UL (ref 1.7–7.7)
NEUTROPHILS RELATIVE PERCENT: 68.8 %
PDW BLD-RTO: 14.8 % (ref 12.4–15.4)
PERFORMED ON: ABNORMAL
PLATELET # BLD: 195 K/UL (ref 135–450)
PMV BLD AUTO: 8.9 FL (ref 5–10.5)
POTASSIUM REFLEX MAGNESIUM: 4.1 MMOL/L (ref 3.5–5.1)
RBC # BLD: 3.16 M/UL (ref 4–5.2)
SODIUM BLD-SCNC: 139 MMOL/L (ref 136–145)
WBC # BLD: 4.7 K/UL (ref 4–11)

## 2021-03-03 PROCEDURE — 36415 COLL VENOUS BLD VENIPUNCTURE: CPT

## 2021-03-03 PROCEDURE — 80048 BASIC METABOLIC PNL TOTAL CA: CPT

## 2021-03-03 PROCEDURE — 6370000000 HC RX 637 (ALT 250 FOR IP): Performed by: HOSPITALIST

## 2021-03-03 PROCEDURE — 96375 TX/PRO/DX INJ NEW DRUG ADDON: CPT

## 2021-03-03 PROCEDURE — 94640 AIRWAY INHALATION TREATMENT: CPT

## 2021-03-03 PROCEDURE — G0378 HOSPITAL OBSERVATION PER HR: HCPCS

## 2021-03-03 PROCEDURE — 6370000000 HC RX 637 (ALT 250 FOR IP): Performed by: FAMILY MEDICINE

## 2021-03-03 PROCEDURE — 6370000000 HC RX 637 (ALT 250 FOR IP): Performed by: INTERNAL MEDICINE

## 2021-03-03 PROCEDURE — 99214 OFFICE O/P EST MOD 30 MIN: CPT | Performed by: INTERNAL MEDICINE

## 2021-03-03 PROCEDURE — 6370000000 HC RX 637 (ALT 250 FOR IP): Performed by: NURSE PRACTITIONER

## 2021-03-03 PROCEDURE — 6360000002 HC RX W HCPCS: Performed by: FAMILY MEDICINE

## 2021-03-03 PROCEDURE — 96372 THER/PROPH/DIAG INJ SC/IM: CPT

## 2021-03-03 PROCEDURE — 85025 COMPLETE CBC W/AUTO DIFF WBC: CPT

## 2021-03-03 PROCEDURE — 2580000003 HC RX 258: Performed by: FAMILY MEDICINE

## 2021-03-03 PROCEDURE — 94760 N-INVAS EAR/PLS OXIMETRY 1: CPT

## 2021-03-03 RX ORDER — LIDOCAINE 4 G/G
1 PATCH TOPICAL DAILY
Status: DISCONTINUED | OUTPATIENT
Start: 2021-03-03 | End: 2021-03-05 | Stop reason: HOSPADM

## 2021-03-03 RX ORDER — INSULIN GLARGINE 100 [IU]/ML
15 INJECTION, SOLUTION SUBCUTANEOUS NIGHTLY
Status: DISCONTINUED | OUTPATIENT
Start: 2021-03-03 | End: 2021-03-04

## 2021-03-03 RX ORDER — CYCLOBENZAPRINE HCL 10 MG
10 TABLET ORAL 3 TIMES DAILY PRN
Status: DISCONTINUED | OUTPATIENT
Start: 2021-03-03 | End: 2021-03-05 | Stop reason: HOSPADM

## 2021-03-03 RX ORDER — NITROGLYCERIN 0.4 MG/1
0.4 TABLET SUBLINGUAL EVERY 5 MIN PRN
Status: DISCONTINUED | OUTPATIENT
Start: 2021-03-03 | End: 2021-03-05 | Stop reason: HOSPADM

## 2021-03-03 RX ORDER — SODIUM CHLORIDE 9 MG/ML
INJECTION, SOLUTION INTRAVENOUS CONTINUOUS
Status: DISCONTINUED | OUTPATIENT
Start: 2021-03-03 | End: 2021-03-03

## 2021-03-03 RX ADMIN — QUETIAPINE FUMARATE 25 MG: 25 TABLET ORAL at 21:02

## 2021-03-03 RX ADMIN — ATORVASTATIN CALCIUM 80 MG: 80 TABLET, FILM COATED ORAL at 21:02

## 2021-03-03 RX ADMIN — METOPROLOL SUCCINATE 25 MG: 25 TABLET, EXTENDED RELEASE ORAL at 18:38

## 2021-03-03 RX ADMIN — HYDRALAZINE HYDROCHLORIDE 50 MG: 50 TABLET, FILM COATED ORAL at 21:02

## 2021-03-03 RX ADMIN — ASPIRIN 81 MG: 81 TABLET, CHEWABLE ORAL at 09:04

## 2021-03-03 RX ADMIN — HYDRALAZINE HYDROCHLORIDE 50 MG: 50 TABLET, FILM COATED ORAL at 09:04

## 2021-03-03 RX ADMIN — ONDANSETRON 4 MG: 2 INJECTION INTRAMUSCULAR; INTRAVENOUS at 11:49

## 2021-03-03 RX ADMIN — SODIUM CHLORIDE, PRESERVATIVE FREE 10 ML: 5 INJECTION INTRAVENOUS at 09:09

## 2021-03-03 RX ADMIN — OXYCODONE HYDROCHLORIDE AND ACETAMINOPHEN 1 TABLET: 7.5; 325 TABLET ORAL at 15:08

## 2021-03-03 RX ADMIN — BUDESONIDE AND FORMOTEROL FUMARATE DIHYDRATE 2 PUFF: 160; 4.5 AEROSOL RESPIRATORY (INHALATION) at 08:30

## 2021-03-03 RX ADMIN — AMLODIPINE BESYLATE 5 MG: 5 TABLET ORAL at 09:04

## 2021-03-03 RX ADMIN — DULOXETINE HYDROCHLORIDE 60 MG: 60 CAPSULE, DELAYED RELEASE ORAL at 21:02

## 2021-03-03 RX ADMIN — SODIUM CHLORIDE, PRESERVATIVE FREE 10 ML: 5 INJECTION INTRAVENOUS at 21:03

## 2021-03-03 RX ADMIN — AMLODIPINE BESYLATE 5 MG: 5 TABLET ORAL at 21:02

## 2021-03-03 RX ADMIN — ENOXAPARIN SODIUM 40 MG: 40 INJECTION SUBCUTANEOUS at 09:09

## 2021-03-03 RX ADMIN — ACETAMINOPHEN 650 MG: 325 TABLET ORAL at 05:42

## 2021-03-03 RX ADMIN — INSULIN GLARGINE 15 UNITS: 100 INJECTION, SOLUTION SUBCUTANEOUS at 23:34

## 2021-03-03 RX ADMIN — METOPROLOL SUCCINATE 25 MG: 25 TABLET, EXTENDED RELEASE ORAL at 09:04

## 2021-03-03 RX ADMIN — OXYCODONE HYDROCHLORIDE AND ACETAMINOPHEN 1 TABLET: 7.5; 325 TABLET ORAL at 21:02

## 2021-03-03 RX ADMIN — CLOPIDOGREL BISULFATE 75 MG: 75 TABLET ORAL at 09:04

## 2021-03-03 RX ADMIN — OXYCODONE HYDROCHLORIDE AND ACETAMINOPHEN 1 TABLET: 7.5; 325 TABLET ORAL at 09:04

## 2021-03-03 RX ADMIN — CYCLOBENZAPRINE HYDROCHLORIDE 10 MG: 10 TABLET, FILM COATED ORAL at 23:27

## 2021-03-03 RX ADMIN — NITROGLYCERIN 0.4 MG: 0.4 TABLET, ORALLY DISINTEGRATING SUBLINGUAL at 05:06

## 2021-03-03 ASSESSMENT — PAIN DESCRIPTION - FREQUENCY: FREQUENCY: CONTINUOUS

## 2021-03-03 ASSESSMENT — PAIN DESCRIPTION - ONSET
ONSET: ON-GOING
ONSET: ON-GOING

## 2021-03-03 ASSESSMENT — PAIN SCALES - GENERAL
PAINLEVEL_OUTOF10: 4
PAINLEVEL_OUTOF10: 10
PAINLEVEL_OUTOF10: 8
PAINLEVEL_OUTOF10: 8
PAINLEVEL_OUTOF10: 5

## 2021-03-03 ASSESSMENT — PAIN DESCRIPTION - PAIN TYPE
TYPE: ACUTE PAIN;CHRONIC PAIN
TYPE: ACUTE PAIN

## 2021-03-03 ASSESSMENT — PAIN DESCRIPTION - DESCRIPTORS: DESCRIPTORS: HEADACHE

## 2021-03-03 ASSESSMENT — PAIN DESCRIPTION - LOCATION: LOCATION: HEAD

## 2021-03-03 ASSESSMENT — PAIN DESCRIPTION - PROGRESSION: CLINICAL_PROGRESSION: NOT CHANGED

## 2021-03-03 NOTE — PROGRESS NOTES
Hospitalist Progress Note      PCP: Fabricio Fraser MD    Date of Admission: 3/1/2021    Chief Complaint: chest pain    Hospital Course:      Subjective: states chest pressure is fairly constant. Troponin and telemetry unremarkable       Medications:  Reviewed    Infusion Medications   Scheduled Medications    amLODIPine  5 mg Oral BID    aspirin  81 mg Oral Daily    atorvastatin  80 mg Oral Nightly    budesonide-formoterol  2 puff Inhalation Daily    clopidogrel  75 mg Oral Daily    DULoxetine  60 mg Oral Nightly    hydrALAZINE  50 mg Oral BID    insulin glargine  60 Units Subcutaneous BID    metoprolol succinate  25 mg Oral BID WC    QUEtiapine  25 mg Oral Nightly    sodium chloride flush  10 mL Intravenous 2 times per day    enoxaparin  40 mg Subcutaneous Daily     PRN Meds: oxyCODONE-acetaminophen, sodium chloride flush, promethazine **OR** ondansetron, polyethylene glycol, acetaminophen **OR** acetaminophen      Intake/Output Summary (Last 24 hours) at 3/2/2021 2037  Last data filed at 3/2/2021 1925  Gross per 24 hour   Intake 720 ml   Output --   Net 720 ml       Exam:    /73   Pulse 72   Temp 97.8 °F (36.6 °C) (Oral)   Resp 16   Ht 5' (1.524 m)   Wt 123 lb 0.3 oz (55.8 kg)   SpO2 96%   BMI 24.03 kg/m²     General appearance: No apparent distress, appears stated age and cooperative. HEENT: Pupils equal, round, and reactive to light. Conjunctivae/corneas clear. Neck: Supple, with full range of motion. No jugular venous distention. Trachea midline. Respiratory:  Normal respiratory effort. Clear to auscultation, bilaterally without Rales/Wheezes/Rhonchi. Cardiovascular: Regular rate and rhythm with normal S1/S2 without murmurs, rubs or gallops. Abdomen: Soft, non-tender, non-distended with normal bowel sounds. Musculoskeletal: No clubbing, cyanosis or edema bilaterally. Full range of motion without deformity.   Skin: Skin color, texture, turgor normal.  No rashes or lesions. Neurologic:  Neurovascularly intact without any focal sensory/motor deficits. Cranial nerves: II-XII intact, grossly non-focal.  Psychiatric: Alert and oriented, thought content appropriate, normal insight  Capillary Refill: Brisk,< 3 seconds   Peripheral Pulses: +2 palpable, equal bilaterally       Labs:   Recent Labs     03/01/21  1140 03/02/21  0655   WBC 4.8 4.7   HGB 9.7* 10.4*   HCT 29.7* 31.4*    213     Recent Labs     03/01/21  1140 03/02/21  0655    143   K 3.8 4.0    109   CO2 26 20*   BUN 20 25*   CREATININE 1.3* 1.3*   CALCIUM 9.3 9.2     Recent Labs     03/01/21  1140   AST 15   ALT 7*   BILITOT <0.2   ALKPHOS 111     Recent Labs     03/01/21  1140   INR 1.02     Recent Labs     03/01/21  1140 03/02/21  0655   TROPONINI <0.01 <0.01       Urinalysis:      Lab Results   Component Value Date    NITRU Negative 10/27/2020    WBCUA 7 10/27/2020    BACTERIA RARE 08/29/2019    RBCUA 229 10/27/2020    BLOODU LARGE 10/27/2020    SPECGRAV 1.022 10/27/2020    GLUCOSEU 250 10/27/2020    GLUCOSEU NEGATIVE 05/14/2012       Radiology:  XR CHEST PORTABLE   Final Result   No acute process. CT HEAD WO CONTRAST   Final Result   No acute intracranial abnormality. Assessment/Plan:    Active Hospital Problems    Diagnosis Date Noted    Chest pain [R07.9] 12/22/2020     ACS r/o:  - h/o PCI most recently in 2018  - telemetry, troponin trend  - stress test in Dec 2019 unremarkable  - hold off on further stress testing --> consulted cardiology   - cardiology unable to see the patient today; reassurance given to patient, but she wanted to talk to the cardiologist  - cont plavix and ASA, statin     H/o HTN;  Cont home medications, amlodipine, hydralazine  H/o DM2:  Cont home lantus  Mood/pain d/o:  Cont cymbalta, seroquel    DVT Prophylaxis: Lovenox  Diet: DIET CARDIAC; Carb Control: 4 carb choices (60 gms)/meal; Low Sodium (2 GM);  Daily Fluid Restriction: 2000 ml  Code Status: Full Code    PT/OT Eval Status: N/A    Dispo - MedSurg. Patient ready for discharge but she wants to see cardiologist.  Will D/C tomorrow.     Mana Anders MD

## 2021-03-03 NOTE — PROGRESS NOTES
Pt alert and oriented x4. 2100 blood sugar= 93. Refusing night time scheduled Lantus due to AM blood sugar= 67. On call Hospitalist Keshawn Toure NP notified. No new orders at this time.

## 2021-03-03 NOTE — PLAN OF CARE
Problem: Pain:  Goal: Pain level will decrease  Description: Pain level will decrease  3/3/2021 0920 by Leticia Knott RN  Outcome: Ongoing   Pain/Discomfort is being managed with PRN analgesics per MD orders (See MAR). Patient is able to express and rate pain using numerical scale. Problem: Falls - Risk of:  Goal: Will remain free from falls  Description: Will remain free from falls  3/3/2021 0920 by Leticia Knott RN  Outcome: Ongoing   Pt free of falls this shift. Fall precautions in place. Bed in lowest position side rails x2. Fall precaution indication light on. Exit alarms in place. Non slip socks on. Fall ID bracelet on. Needed items within reach. Call light within reach.     Problem: HEMODYNAMIC STATUS  Goal: Patient has stable vital signs and fluid balance  3/3/2021 0920 by Leticia Knott RN  Outcome: Ongoing

## 2021-03-03 NOTE — PROGRESS NOTES
Hospitalist Progress Note      PCP: Jose oSmmer MD    Date of Admission: 3/1/2021        Subjective:     No chest pain today. Complains of chronic daily headache. Waiting to talk to cardiology      Medications:  Reviewed    Infusion Medications    dextrose       Scheduled Medications    insulin glargine  15 Units Subcutaneous Nightly    amLODIPine  5 mg Oral BID    aspirin  81 mg Oral Daily    atorvastatin  80 mg Oral Nightly    budesonide-formoterol  2 puff Inhalation Daily    clopidogrel  75 mg Oral Daily    DULoxetine  60 mg Oral Nightly    hydrALAZINE  50 mg Oral BID    metoprolol succinate  25 mg Oral BID WC    QUEtiapine  25 mg Oral Nightly    sodium chloride flush  10 mL Intravenous 2 times per day    enoxaparin  40 mg Subcutaneous Daily     PRN Meds: nitroGLYCERIN, glucose, dextrose, glucagon (rDNA), dextrose, oxyCODONE-acetaminophen, sodium chloride flush, promethazine **OR** ondansetron, polyethylene glycol, acetaminophen **OR** acetaminophen      Intake/Output Summary (Last 24 hours) at 3/3/2021 1506  Last data filed at 3/3/2021 0600  Gross per 24 hour   Intake 960 ml   Output --   Net 960 ml       Exam:    BP (!) 125/49   Pulse 56   Temp 98 °F (36.7 °C) (Oral)   Resp 18   Ht 5' (1.524 m)   Wt 123 lb 0.3 oz (55.8 kg)   SpO2 99%   BMI 24.03 kg/m²     General appearance: No apparent distress, appears stated age and cooperative. HEENT: Pupils equal, round, and reactive to light. Conjunctivae/corneas clear. Neck: Supple, with full range of motion. No jugular venous distention. Trachea midline. Respiratory:  Normal respiratory effort. Clear to auscultation, bilaterally without Rales/Wheezes/Rhonchi. Cardiovascular: Regular rate and rhythm with normal S1/S2 without murmurs, rubs or gallops. Abdomen: Soft, non-tender, non-distended with normal bowel sounds. Musculoskeletal: No clubbing, cyanosis or edema bilaterally. Full range of motion without deformity.   Skin: Skin color, texture, turgor normal.  No rashes or lesions. Neurologic:  Neurovascularly intact without any focal sensory/motor deficits. Cranial nerves: II-XII intact, grossly non-focal.  Psychiatric: Alert and oriented, thought content appropriate, normal insight  Capillary Refill: Brisk,< 3 seconds   Peripheral Pulses: +2 palpable, equal bilaterally       Labs:   Recent Labs     03/01/21  1140 03/02/21  0655 03/03/21  0648   WBC 4.8 4.7 4.7   HGB 9.7* 10.4* 10.2*   HCT 29.7* 31.4* 31.1*    213 195     Recent Labs     03/01/21  1140 03/02/21  0655 03/03/21  0647    143 139   K 3.8 4.0 4.1    109 104   CO2 26 20* 22   BUN 20 25* 31*   CREATININE 1.3* 1.3* 1.6*   CALCIUM 9.3 9.2 9.0     Recent Labs     03/01/21  1140   AST 15   ALT 7*   BILITOT <0.2   ALKPHOS 111     Recent Labs     03/01/21  1140   INR 1.02     Recent Labs     03/01/21  1140 03/02/21  0655   TROPONINI <0.01 <0.01       Assessment/Plan:    -Chest pain--resolved. . Negative enzymes and EKG  -CAD with prior PCI in 2018, negative stress test in 2019--on Plavix, aspirin and statin  -Insulin induced hypoglycemia--dose decreased  -DM type II, insulin-dependent  -CKD stage III -baseline 1.3-1.6  -Essential hypertension-stable-on amlodipine and hydralazine  -Mood disorder-on Cymbalta and Seroquel  -Chronic anemia  -Chronic daily headaches-follows with neurology-states she will be getting Botox injections soon    Plan  Awaiting cardiology recommendations prior to discharge  Continue current treatment  Decreased insulin dose due to hypoglycemia  Closely monitor renal function, on IV fluids    DVT Prophylaxis: Lovenox  Diet: DIET CARDIAC; Carb Control: 4 carb choices (60 gms)/meal; Low Sodium (2 GM);  Daily Fluid Restriction: 2000 ml  Code Status: Full Code        Margoth Roper MD

## 2021-03-03 NOTE — PLAN OF CARE
Problem: Pain:  Description: Pain management should include both nonpharmacologic and pharmacologic interventions. Goal: Pain level will decrease  Description: Pain level will decrease  Outcome: Ongoing  Goal: Control of acute pain  Description: Control of acute pain  Outcome: Ongoing  Goal: Control of chronic pain  Description: Control of chronic pain  Outcome: Ongoing  Pain level assessed. Pt able to rate pain on a scale of 0-10. Administered PRN analgesics per order. Reassessed for effectiveness. Will continue to monitor. Problem: Falls - Risk of:  Goal: Will remain free from falls  Description: Will remain free from falls  Outcome: Ongoing  Goal: Absence of physical injury  Description: Absence of physical injury  Outcome: Ongoing  Fall risk assessed. Precautions in place. Bed low and locked with side rails up x2. Nonskid socks on when OOB. Bed/chair alarm utilized. Call light within reach. Frequent checks performed. No falls at this time.       Problem: OXYGENATION/RESPIRATORY FUNCTION  Goal: Patient will maintain patent airway  Outcome: Ongoing  Goal: Patient will achieve/maintain normal respiratory rate/effort  Description: Respiratory rate and effort will be within normal limits for the patient  Outcome: Ongoing     Problem: HEMODYNAMIC STATUS  Goal: Patient has stable vital signs and fluid balance  Outcome: Ongoing     Problem: FLUID AND ELECTROLYTE IMBALANCE  Goal: Fluid and electrolyte balance are achieved/maintained  Outcome: Ongoing  2000 ml fluid restriction     Problem: ACTIVITY INTOLERANCE/IMPAIRED MOBILITY  Goal: Mobility/activity is maintained at optimum level for patient  Outcome: Ongoing

## 2021-03-03 NOTE — PROGRESS NOTES
Pt resting in bed with respirations even and unlabored. C/O headache pain. PRN pain meds provided along with scheduled AM meds. VSS. No needs at this time. Will monitor.

## 2021-03-03 NOTE — CARE COORDINATION
[facility]  Hypoglycemia Event and Prevention Plan      NAME: Madisyn Jimenez  MEDICAL RECORD NUMBER:  [id number]  AGE: 60 y.o. GENDER: female  : 1955  EPISODE DATE:  3/3/2021     Data     Recent Labs     21  1637 21  2033 21  0302 21  0504 21  0600 21  0747   POCGLU 100* 93 54* 66* 130* 158*       Medications  Scheduled Medications:   insulin glargine  30 Units Subcutaneous Nightly    amLODIPine  5 mg Oral BID    aspirin  81 mg Oral Daily    atorvastatin  80 mg Oral Nightly    budesonide-formoterol  2 puff Inhalation Daily    clopidogrel  75 mg Oral Daily    DULoxetine  60 mg Oral Nightly    hydrALAZINE  50 mg Oral BID    metoprolol succinate  25 mg Oral BID WC    QUEtiapine  25 mg Oral Nightly    sodium chloride flush  10 mL Intravenous 2 times per day    enoxaparin  40 mg Subcutaneous Daily       Diet  Current diet/supplement order: DIET CARDIAC; Carb Control: 4 carb choices (60 gms)/meal; Low Sodium (2 GM); Daily Fluid Restriction: 2000 ml     Recorded PO: PO Meals Eaten (%): 76 - 100% last meal in flowsheets      Action        Physician Notified of event: Yes  Fabricio Carroll MD    She refused Lantus for BG 93. No insulin at all yesterday.  Recommend reducing dose to weight based dose of 15 units      Electronically signed by Aristides Rashid RN on 3/3/2021 at 10:02 AM

## 2021-03-03 NOTE — CONSULTS
[facility]  Cardiology Consult    Jie Persaud  1955    March 3, 2021        CC: Chest pain      Subjective:     History of Present Illness:    Jie Persaud is a 60 y.o. patient with a PMH significant for coronary disease, atrial fibrillation presented with complaints of chest pain. Chest pain substernal ongoing no aggravating or relieving factor. Past Medical History:   has a past medical history of Acid reflux, Anemia, Anxiety, Arthritis, Asthma, Atrial fibrillation ([address] Blood transfusion reaction, CAD (coronary artery disease), Cerebral artery occlusion with cerebral infarction ([address] CHF (congestive heart failure) ([address] Chronic kidney disease, COPD (chronic obstructive pulmonary disease) ([address] Depression, DM2 (diabetes mellitus, type 2) ([address] Dysarthria, Fibromyalgia, Headache(784.0), Hemisensory loss, Hyperlipidemia, Hypertension, IBS (irritable bowel syndrome), Inferior vena cava occlusion ([address] Irritable bowel syndrome, Keratitis, MI, old, Neuropathy, Superior vena cava obstruction, and Temporal arteritis ([address] Surgical History:   has a past surgical history that includes knee surgery; Tunneled venous port placement; Coronary angioplasty with stent; Cholecystectomy; ablation of dysrhythmic focus; Cataract removal (Bilateral); joint replacement (Right); Hysterectomy; Artery Biopsy (Right, 04/23/2014); Coronary angioplasty with stent; Knee arthroscopy (Right); vascular surgery; Percutaneous Transluminal Coronary Angio (10/2019); and Upper gastrointestinal endoscopy (N/A, 7/6/2020). Social History:   reports that she quit smoking about 2 years ago. Her smoking use included cigarettes. She has a 17.50 pack-year smoking history. She has quit using smokeless tobacco. She reports that she does not drink alcohol or use drugs.      Family History:  family history includes Cancer in her mother; Diabetes in her mother; High Cholesterol in her mother; Hypertension in her mother; No Known Problems in her paternal grandfather; Stroke in her mother. Home Medications:  Were reviewed and are listed in nursing record and/or below  Prior to Admission medications    Medication Sig Start Date End Date Taking? Authorizing Provider   insulin glargine (BASAGLAR KWIKPEN) 100 UNIT/ML injection pen Inject 60 Units into the skin 2 times daily   Yes Historical Provider, MD   oxyCODONE-acetaminophen (PERCOCET) 7.5-325 MG per tablet Take 1 tablet by mouth every 6 hours as needed for Pain (max 3-4 day) for up to 28 days. 2/19/21 3/19/21 Yes Leno Martinez MD   metoprolol succinate (TOPROL XL) 50 MG extended release tablet TAKE 0.5 TABLETS BY MOUTH 2 TIMES DAILY (WITH MEALS) 2/5/21  Yes CYRUS Crowell   omeprazole (PRILOSEC) 20 MG delayed release capsule TAKE 1 CAPSULE BY MOUTH DAILY 2/5/21  Yes CYRUS Crowell   rizatriptan (MAXALT) 10 MG tablet Take 1 tablet by mouth once as needed for Migraine May repeat in 2 hours if needed 2/2/21 3/1/21 Yes Leno Martinez MD   AIMOVIG 70 MG/ML SOAJ INJECT 140 MLS INTO THE SKIN EVERY 30 DAYS 2/2/21  Yes Leno Martinez MD   QUEtiapine (SEROQUEL) 25 MG tablet TAKE 1-2 TABLETS BY MOUTH NIGHTLY 2/2/21  Yes Leno Martinez MD   DULoxetine (CYMBALTA) 60 MG extended release capsule TAKE 1 CAPSULE BY MOUTH DAILY 2/2/21  Yes Leno Martinez MD   aspirin 81 MG chewable tablet Take 1 tablet by mouth daily 12/24/20  Yes Alec Reardon MD   dicyclomine (BENTYL) 20 MG tablet Take 20 mg by mouth 4 times daily (before meals and nightly)  12/9/20  Yes Historical Provider, MD   nitroGLYCERIN (NITROSTAT) 0.4 MG SL tablet Place 1 tablet under the tongue every 5 minutes as needed for Chest pain up to max of 3 total doses.  If no relief after 1 dose, call 911. 12/16/20  Yes Marli Staton MD   Nutritional Supplements (ENSURE) LIQD Take 1 Can by mouth 3 times daily as needed (nutrition) 12/16/20  Yes Marli Staton MD   hydrALAZINE (APRESOLINE) 50 MG tablet Take 1 tablet by mouth 2 times daily 10/1/20  Yes Shoaib Restrepo MD   atorvastatin (LIPITOR) 80 MG tablet Take 1 tablet by mouth nightly 10/1/20  Yes Shoaib Restrepo MD   amLODIPine (NORVASC) 5 MG tablet Take 1 tablet by mouth 2 times daily 10/1/20  Yes Shoaib Restrepo MD   lidocaine (XYLOCAINE) 5 % ointment Apply topically as needed.  10/1/20  Yes Shoaib Restrepo MD   blood glucose test strips (TRUE METRIX BLOOD GLUCOSE TEST) strip 1 each by Does not apply route 2 times daily 9/25/20  Yes Shoaib Restrepo MD   ondansetron (ZOFRAN) 4 MG tablet Take 1 tablet by mouth 3 times daily as needed for Nausea or Vomiting 9/23/20  Yes Shoaib Restrepo MD   clopidogrel (PLAVIX) 75 MG tablet TAKE 1 TABLET BY MOUTH DA JULIEN 9/17/20  Yes Shoaib Restrepo MD   albuterol (PROVENTIL) (2.5 MG/3ML) 0.083% nebulizer solution Take 3 mLs by nebulization every 4 hours as needed for Wheezing 3/25/20  Yes Shoaib Restrepo MD   budesonide-formoterol (SYMBICORT) 160-4.5 MCG/ACT AERO Inhale 2 puffs into the lungs daily 12/6/19  Yes Shoaib Restrepo MD   albuterol sulfate HFA (VENTOLIN HFA) 108 (90 Base) MCG/ACT inhaler Inhale 2 puffs into the lungs every 4 hours as needed for Wheezing or Shortness of Breath 12/6/19  Yes Shoaib Restrepo MD   ranolazine (RANEXA) 1000 MG extended release tablet Take 1 tablet by mouth 2 times daily 9/10/19  Yes Shoaib Restrepo MD        CURRENT Medications:      nitroGLYCERIN (NITROSTAT) SL tablet 0.4 mg, Q5 Min PRN      insulin glargine (LANTUS) injection vial 15 Units, Nightly      glucose (GLUTOSE) 40 % oral gel 15 g, PRN      dextrose 50 % IV solution, PRN      glucagon (rDNA) injection 1 mg, PRN      dextrose 5 % solution, PRN      amLODIPine (NORVASC) tablet 5 mg, BID      aspirin chewable tablet 81 mg, Daily      atorvastatin (LIPITOR) tablet 80 mg, Nightly      budesonide-formoterol (SYMBICORT) 160-4.5 MCG/ACT inhaler 2 puff, Daily      clopidogrel (PLAVIX) tablet 75 mg, Daily      DULoxetine (CYMBALTA) extended release capsule 60 mg, Nightly      hydrALAZINE (APRESOLINE) tablet 50 mg, BID      metoprolol succinate (TOPROL XL) extended release tablet 25 mg, BID WC      oxyCODONE-acetaminophen (PERCOCET) 7.5-325 MG per tablet 1 tablet, Q6H PRN      QUEtiapine (SEROQUEL) tablet 25 mg, Nightly      sodium chloride flush 0.9 % injection 10 mL, 2 times per day      sodium chloride flush 0.9 % injection 10 mL, PRN      enoxaparin (LOVENOX) injection 40 mg, Daily      promethazine (PHENERGAN) tablet 12.5 mg, Q6H PRN    Or      ondansetron (ZOFRAN) injection 4 mg, Q6H PRN      polyethylene glycol (GLYCOLAX) packet 17 g, Daily PRN      acetaminophen (TYLENOL) tablet 650 mg, Q6H PRN    Or      acetaminophen (TYLENOL) suppository 650 mg, Q6H PRN        Allergies:  Patient has no known allergies. Review of Systems: SEE HPI   · Constitutional: no unanticipated weight loss. There's been no change in energy level, sleep pattern, or activity level. No fevers, chills. · Eyes: No visual changes or diplopia. No scleral icterus. · ENT: No Headaches, hearing loss or vertigo. No mouth sores or sore throat. · Cardiovascular:  Chest pain, tightness or discomfort.  No Shortness of breath. No Dyspnea on exertion, Orthopnea, Paroxysmal nocturnal dyspnea or breathlessness at rest.   No Palpitations.  No Syncope ('blackouts', 'faints', 'collapse') or dizziness. · Respiratory: No cough or wheezing, no sputum production. No hematemesis. · Gastrointestinal: No abdominal pain, appetite loss, blood in stools. No change in bowel or bladder habits. · Genitourinary: No dysuria, trouble voiding, or hematuria. · Musculoskeletal:  No gait disturbance, no joint complaints. · Integumentary: No rash or pruritis. · Neurological: No headache, diplopia, change in muscle strength, numbness or tingling. · Psychiatric: No anxiety or depression. · Endocrine: No temperature intolerance.  No excessive thirst, fluid intake, or urination. No tremor. · Hematologic/Lymphatic: No abnormal bruising or bleeding, blood clots or swollen lymph nodes. · Allergic/Immunologic: No nasal congestion or hives. Objective:     PHYSICAL EXAM:      Vitals:    03/03/21 1807   BP: (!) 140/58   Pulse: 59   Resp: 18   Temp: 97.8 °F (36.6 °C)   SpO2: 100%    Weight: 123 lb 0.3 oz (55.8 kg)       General Appearance:  Alert, cooperative, no distress, appears stated age. Head:  Normocephalic, without obvious abnormality, atraumatic. Eyes:  Pupils equal and round. No scleral icterus. Mouth: Moist mucosa, no pharyngeal erythema. Nose: Nares normal. No drainage or sinus tenderness. Neck: Supple, symmetrical, trachea midline. No adenopathy. No tenderness/mass/nodules. No carotid bruit or elevated JVD. Lungs:   Respiratory Effort: Normal   Auscultation: Clear to auscultation bilaterally, respirations unlabored. No wheeze, rales   Chest Wall:  No tenderness or deformity. Cardiovascular:    Pulses  Palpation: normal   Ascultation: Regular rate, S1/ S2 normal. No murmur, rub, or gallop. 2+ radial and pedal pulses, symmetric  Carotid  Femoral   Abdomen and Gastrointestinal:   Soft, non-tender, bowel sounds active. Liver and Spleen  Masses   Musculoskeletal: No muscle wasting  Back  Gait   Extremities: Extremities normal, atraumatic. No cyanosis or edema. No cyanosis clubbing       Skin: Inspection and palpation performed, no rashes or lesions. Pysch: Normal mood and affect.  Alert and oriented to time place person   Neurologic: Normal gross motor and sensory exam.       Labs     All labs have been reviewed    Lab Results   Component Value Date    WBC 4.7 03/03/2021    RBC 3.16 03/03/2021    HGB 10.2 03/03/2021    HCT 31.1 03/03/2021    MCV 98.4 03/03/2021    RDW 14.8 03/03/2021     03/03/2021     Lab Results   Component Value Date     03/03/2021    K 4.1 03/03/2021     03/03/2021    CO2 22 03/03/2021    BUN 31 03/03/2021    CREATININE 1.6 03/03/2021    GFRAA 39 03/03/2021    GFRAA 60 05/23/2013    AGRATIO 1.0 03/01/2021    LABGLOM 32 03/03/2021    GLUCOSE 177 03/03/2021    PROT 7.7 03/01/2021    PROT 8.1 02/15/2013    CALCIUM 9.0 03/03/2021    BILITOT <0.2 03/01/2021    ALKPHOS 111 03/01/2021    AST 15 03/01/2021    ALT 7 03/01/2021     No results found for: Spectropath  Lab Results   Component Value Date    LABA1C 8.9 (A) 10/01/2020     Lab Results   Component Value Date    CKTOTAL 41 08/22/2019    CKMB 2.3 10/06/2013    CKMBINDEX 0.9 04/01/2010    TROPONINI <0.01 03/02/2021       Cardiac, Vascular and Imaging Data all Personally Reviewed in Detail by Myself      EKG: Normal sinus rhythm no ST-T wave changes        Assessment and Plan     -Chest pain no evidence of acute coronary syndrome. Chronic stable angina. No urgent need for left heart catheterization. We will consider coronary CTA. Essential hypertension continue blood pressure management    Paroxysmal atrial fibrillation continue antithrombotic therapy. Thank you for allowing us to participate in the care of Jei Persaud. Please do not hesitate to contact me if you have any questions.     Shamar Steinberg MD, MPH    [facility], [address] [facility]  Ph: [phone]  Fax: [phone]    3/3/2021 6:37 PM

## 2021-03-04 DIAGNOSIS — G89.4 CHRONIC PAIN SYNDROME: ICD-10-CM

## 2021-03-04 DIAGNOSIS — I10 ESSENTIAL HYPERTENSION: ICD-10-CM

## 2021-03-04 DIAGNOSIS — M79.7 FIBROMYALGIA: ICD-10-CM

## 2021-03-04 LAB
ANION GAP SERPL CALCULATED.3IONS-SCNC: 11 MMOL/L (ref 3–16)
BASOPHILS ABSOLUTE: 0 K/UL (ref 0–0.2)
BASOPHILS RELATIVE PERCENT: 0 %
BUN BLDV-MCNC: 31 MG/DL (ref 7–20)
CALCIUM SERPL-MCNC: 9.3 MG/DL (ref 8.3–10.6)
CHLORIDE BLD-SCNC: 105 MMOL/L (ref 99–110)
CO2: 22 MMOL/L (ref 21–32)
CREAT SERPL-MCNC: 1.4 MG/DL (ref 0.6–1.2)
EOSINOPHILS ABSOLUTE: 0.2 K/UL (ref 0–0.6)
EOSINOPHILS RELATIVE PERCENT: 3 %
GFR AFRICAN AMERICAN: 46
GFR NON-AFRICAN AMERICAN: 38
GLUCOSE BLD-MCNC: 118 MG/DL (ref 70–99)
GLUCOSE BLD-MCNC: 125 MG/DL (ref 70–99)
GLUCOSE BLD-MCNC: 62 MG/DL (ref 70–99)
GLUCOSE BLD-MCNC: 64 MG/DL (ref 70–99)
GLUCOSE BLD-MCNC: 78 MG/DL (ref 70–99)
GLUCOSE BLD-MCNC: 91 MG/DL (ref 70–99)
HCT VFR BLD CALC: 31.3 % (ref 36–48)
HEMOGLOBIN: 10.2 G/DL (ref 12–16)
LYMPHOCYTES ABSOLUTE: 1.9 K/UL (ref 1–5.1)
LYMPHOCYTES RELATIVE PERCENT: 36 %
MCH RBC QN AUTO: 32.6 PG (ref 26–34)
MCHC RBC AUTO-ENTMCNC: 32.8 G/DL (ref 31–36)
MCV RBC AUTO: 99.5 FL (ref 80–100)
MONOCYTES ABSOLUTE: 0.5 K/UL (ref 0–1.3)
MONOCYTES RELATIVE PERCENT: 9 %
NEUTROPHILS ABSOLUTE: 2.8 K/UL (ref 1.7–7.7)
NEUTROPHILS RELATIVE PERCENT: 52 %
PDW BLD-RTO: 14.7 % (ref 12.4–15.4)
PERFORMED ON: ABNORMAL
PERFORMED ON: NORMAL
PERFORMED ON: NORMAL
PLATELET # BLD: 189 K/UL (ref 135–450)
PLATELET SLIDE REVIEW: ADEQUATE
PMV BLD AUTO: 9.2 FL (ref 5–10.5)
POTASSIUM REFLEX MAGNESIUM: 4.3 MMOL/L (ref 3.5–5.1)
RBC # BLD: 3.14 M/UL (ref 4–5.2)
SLIDE REVIEW: ABNORMAL
SODIUM BLD-SCNC: 138 MMOL/L (ref 136–145)
WBC # BLD: 5.4 K/UL (ref 4–11)

## 2021-03-04 PROCEDURE — 6360000002 HC RX W HCPCS: Performed by: FAMILY MEDICINE

## 2021-03-04 PROCEDURE — 6370000000 HC RX 637 (ALT 250 FOR IP): Performed by: NURSE PRACTITIONER

## 2021-03-04 PROCEDURE — 6370000000 HC RX 637 (ALT 250 FOR IP): Performed by: FAMILY MEDICINE

## 2021-03-04 PROCEDURE — 85025 COMPLETE CBC W/AUTO DIFF WBC: CPT

## 2021-03-04 PROCEDURE — 99214 OFFICE O/P EST MOD 30 MIN: CPT | Performed by: NURSE PRACTITIONER

## 2021-03-04 PROCEDURE — 80048 BASIC METABOLIC PNL TOTAL CA: CPT

## 2021-03-04 PROCEDURE — 2580000003 HC RX 258: Performed by: FAMILY MEDICINE

## 2021-03-04 PROCEDURE — 2580000003 HC RX 258: Performed by: NURSE PRACTITIONER

## 2021-03-04 PROCEDURE — 94760 N-INVAS EAR/PLS OXIMETRY 1: CPT

## 2021-03-04 PROCEDURE — G0378 HOSPITAL OBSERVATION PER HR: HCPCS

## 2021-03-04 PROCEDURE — 36415 COLL VENOUS BLD VENIPUNCTURE: CPT

## 2021-03-04 PROCEDURE — 6370000000 HC RX 637 (ALT 250 FOR IP): Performed by: HOSPITALIST

## 2021-03-04 PROCEDURE — 94640 AIRWAY INHALATION TREATMENT: CPT

## 2021-03-04 PROCEDURE — 96372 THER/PROPH/DIAG INJ SC/IM: CPT

## 2021-03-04 RX ORDER — DIVALPROEX SODIUM 250 MG/1
TABLET, EXTENDED RELEASE ORAL
Qty: 90 TABLET | Refills: 1 | OUTPATIENT
Start: 2021-03-04

## 2021-03-04 RX ORDER — INSULIN GLARGINE 100 [IU]/ML
8 INJECTION, SOLUTION SUBCUTANEOUS NIGHTLY
Status: DISCONTINUED | OUTPATIENT
Start: 2021-03-04 | End: 2021-03-05 | Stop reason: HOSPADM

## 2021-03-04 RX ORDER — SODIUM CHLORIDE 9 MG/ML
INJECTION, SOLUTION INTRAVENOUS CONTINUOUS
Status: DISCONTINUED | OUTPATIENT
Start: 2021-03-04 | End: 2021-03-05 | Stop reason: HOSPADM

## 2021-03-04 RX ORDER — DULOXETIN HYDROCHLORIDE 60 MG/1
60 CAPSULE, DELAYED RELEASE ORAL DAILY
Qty: 30 CAPSULE | Refills: 1 | Status: SHIPPED | OUTPATIENT
Start: 2021-03-04 | End: 2021-03-19 | Stop reason: SDUPTHER

## 2021-03-04 RX ADMIN — ENOXAPARIN SODIUM 40 MG: 40 INJECTION SUBCUTANEOUS at 09:23

## 2021-03-04 RX ADMIN — METOPROLOL SUCCINATE 25 MG: 25 TABLET, EXTENDED RELEASE ORAL at 18:07

## 2021-03-04 RX ADMIN — OXYCODONE HYDROCHLORIDE AND ACETAMINOPHEN 1 TABLET: 7.5; 325 TABLET ORAL at 16:11

## 2021-03-04 RX ADMIN — SODIUM CHLORIDE, PRESERVATIVE FREE 10 ML: 5 INJECTION INTRAVENOUS at 09:28

## 2021-03-04 RX ADMIN — INSULIN GLARGINE 8 UNITS: 100 INJECTION, SOLUTION SUBCUTANEOUS at 21:40

## 2021-03-04 RX ADMIN — SODIUM CHLORIDE: 9 INJECTION, SOLUTION INTRAVENOUS at 18:07

## 2021-03-04 RX ADMIN — ACETAMINOPHEN 650 MG: 325 TABLET ORAL at 14:37

## 2021-03-04 RX ADMIN — DULOXETINE HYDROCHLORIDE 60 MG: 60 CAPSULE, DELAYED RELEASE ORAL at 21:03

## 2021-03-04 RX ADMIN — BUDESONIDE AND FORMOTEROL FUMARATE DIHYDRATE 2 PUFF: 160; 4.5 AEROSOL RESPIRATORY (INHALATION) at 08:14

## 2021-03-04 RX ADMIN — CLOPIDOGREL BISULFATE 75 MG: 75 TABLET ORAL at 09:27

## 2021-03-04 RX ADMIN — METOPROLOL SUCCINATE 25 MG: 25 TABLET, EXTENDED RELEASE ORAL at 09:24

## 2021-03-04 RX ADMIN — OXYCODONE HYDROCHLORIDE AND ACETAMINOPHEN 1 TABLET: 7.5; 325 TABLET ORAL at 22:55

## 2021-03-04 RX ADMIN — AMLODIPINE BESYLATE 5 MG: 5 TABLET ORAL at 09:25

## 2021-03-04 RX ADMIN — ASPIRIN 81 MG: 81 TABLET, CHEWABLE ORAL at 09:24

## 2021-03-04 RX ADMIN — ATORVASTATIN CALCIUM 80 MG: 80 TABLET, FILM COATED ORAL at 21:03

## 2021-03-04 RX ADMIN — ACETAMINOPHEN 650 MG: 325 TABLET ORAL at 21:04

## 2021-03-04 RX ADMIN — HYDRALAZINE HYDROCHLORIDE 50 MG: 50 TABLET, FILM COATED ORAL at 09:23

## 2021-03-04 RX ADMIN — OXYCODONE HYDROCHLORIDE AND ACETAMINOPHEN 1 TABLET: 7.5; 325 TABLET ORAL at 09:33

## 2021-03-04 RX ADMIN — QUETIAPINE FUMARATE 25 MG: 25 TABLET ORAL at 21:03

## 2021-03-04 ASSESSMENT — PAIN DESCRIPTION - LOCATION: LOCATION: HEAD

## 2021-03-04 ASSESSMENT — PAIN DESCRIPTION - FREQUENCY: FREQUENCY: CONTINUOUS

## 2021-03-04 ASSESSMENT — PAIN SCALES - GENERAL
PAINLEVEL_OUTOF10: 8
PAINLEVEL_OUTOF10: 7
PAINLEVEL_OUTOF10: 10
PAINLEVEL_OUTOF10: 5
PAINLEVEL_OUTOF10: 6

## 2021-03-04 ASSESSMENT — PAIN DESCRIPTION - PROGRESSION: CLINICAL_PROGRESSION: NOT CHANGED

## 2021-03-04 ASSESSMENT — PAIN DESCRIPTION - ONSET: ONSET: ON-GOING

## 2021-03-04 ASSESSMENT — PAIN SCALES - WONG BAKER: WONGBAKER_NUMERICALRESPONSE: 0

## 2021-03-04 ASSESSMENT — PAIN DESCRIPTION - PAIN TYPE: TYPE: ACUTE PAIN

## 2021-03-04 ASSESSMENT — PAIN - FUNCTIONAL ASSESSMENT: PAIN_FUNCTIONAL_ASSESSMENT: PREVENTS OR INTERFERES SOME ACTIVE ACTIVITIES AND ADLS

## 2021-03-04 ASSESSMENT — PAIN DESCRIPTION - DESCRIPTORS: DESCRIPTORS: HEADACHE

## 2021-03-04 NOTE — PROGRESS NOTES
Pt alert and oriented eating breakfast, POCT 64. Patient denies symptoms of hypoglycemia, given 4 oz of fruit juice per protocol. POCT rechecked BG 91. Will continue to monitor. RN made Dr. Mike Conteh aware, see modified Lantus dose. Will continue to monitor.

## 2021-03-04 NOTE — TELEPHONE ENCOUNTER
Medication:   Requested Prescriptions     Pending Prescriptions Disp Refills    omeprazole (PRILOSEC) 20 MG delayed release capsule [Pharmacy Med Name: OMEPRAZOLE 20 MG CPDR 20 Capsule] 30 capsule 1     Sig: TAKE 1 CAPSULE BY MOUTH DAILY        Last Filled:      Patient Phone Number: [phone] (home)     Last appt: 3/1/2021   Next appt: Visit date not found    Last OARRS:   RX Monitoring 4/9/2019   Attestation The Prescription Monitoring Report for this patient was reviewed today.

## 2021-03-04 NOTE — PROGRESS NOTES
Hospitalist Progress Note      PCP: Raghu Damon MD    Date of Admission: 3/1/2021        Subjective:     Complains of occasional chest heaviness. . Has chronic daily headache    Medications:  Reviewed    Infusion Medications    dextrose       Scheduled Medications    insulin glargine  8 Units Subcutaneous Nightly    lidocaine  1 patch Transdermal Daily    amLODIPine  5 mg Oral BID    aspirin  81 mg Oral Daily    atorvastatin  80 mg Oral Nightly    budesonide-formoterol  2 puff Inhalation Daily    clopidogrel  75 mg Oral Daily    DULoxetine  60 mg Oral Nightly    hydrALAZINE  50 mg Oral BID    metoprolol succinate  25 mg Oral BID WC    QUEtiapine  25 mg Oral Nightly    sodium chloride flush  10 mL Intravenous 2 times per day    enoxaparin  40 mg Subcutaneous Daily     PRN Meds: nitroGLYCERIN, cyclobenzaprine, glucose, dextrose, glucagon (rDNA), dextrose, oxyCODONE-acetaminophen, sodium chloride flush, promethazine **OR** ondansetron, polyethylene glycol, acetaminophen **OR** acetaminophen      Intake/Output Summary (Last 24 hours) at 3/4/2021 1408  Last data filed at 3/4/2021 5938  Gross per 24 hour   Intake 1210 ml   Output --   Net 1210 ml       Exam:    /65   Pulse 60   Temp 97.6 °F (36.4 °C) (Axillary)   Resp 18   Ht 5' (1.524 m)   Wt 123 lb 7.3 oz (56 kg)   SpO2 99%   BMI 24.11 kg/m²     General appearance: No apparent distress, appears stated age and cooperative. HEENT: Pupils equal, round, and reactive to light. Conjunctivae/corneas clear. Neck: Supple, with full range of motion. No jugular venous distention. Trachea midline. Respiratory:  Normal respiratory effort. Clear to auscultation, bilaterally without Rales/Wheezes/Rhonchi. Cardiovascular: Regular rate and rhythm with normal S1/S2 without murmurs, rubs or gallops. Abdomen: Soft, non-tender, non-distended with normal bowel sounds. Musculoskeletal: No clubbing, cyanosis or edema bilaterally.   Full range of motion without deformity. Skin: Skin color, texture, turgor normal.  No rashes or lesions. Neurologic:  Neurovascularly intact without any focal sensory/motor deficits. Cranial nerves: II-XII intact, grossly non-focal.  Psychiatric: Alert and oriented, thought content appropriate, normal insight  Capillary Refill: Brisk,< 3 seconds   Peripheral Pulses: +2 palpable, equal bilaterally       Labs:   Recent Labs     03/02/21  0655 03/03/21  0648 03/04/21  0737   WBC 4.7 4.7 5.4   HGB 10.4* 10.2* 10.2*   HCT 31.4* 31.1* 31.3*    195 189     Recent Labs     03/02/21  0655 03/03/21  0647 03/04/21  0736    139 138   K 4.0 4.1 4.3    104 105   CO2 20* 22 22   BUN 25* 31* 31*   CREATININE 1.3* 1.6* 1.4*   CALCIUM 9.2 9.0 9.3     No results for input(s): AST, ALT, BILIDIR, BILITOT, ALKPHOS in the last 72 hours. No results for input(s): INR in the last 72 hours. Recent Labs     03/02/21  0655   TROPONINI <0.01       Assessment/Plan:    -Chest pain--resolved. . Negative enzymes and EKG  -CAD with prior PCI in 2018, negative stress test in 2019--on Plavix, aspirin and statin  -Insulin induced hypoglycemia--decreased insulin dose further-improved  -DM type II, insulin-dependent  -CKD stage III -baseline 1.3-1.6  -Essential hypertension-stable-on amlodipine and hydralazine  -Mood disorder-on Cymbalta and Seroquel  -Chronic anemia  -Chronic daily headaches-follows with neurology-previously on Aimovog with no improvement. . Did not tolerate rizatriptan    Plan  Appreciate cardiology recommendations  CTA cardiac pending. . If no significant stenosis will discharge  At risk of LYNN. Sanna Power Monitor renal function  Continue current treatment    DVT Prophylaxis: Lovenox  Diet: DIET CARDIAC; Carb Control: 4 carb choices (60 gms)/meal; Low Sodium (2 GM);  Daily Fluid Restriction: 2000 ml  Code Status: Full Code        Vianca Pollack MD

## 2021-03-04 NOTE — PROGRESS NOTES
Called by RN for pt c/o 10/10 headache x 2 days. Reportedly this is a chronic scenario. She has been rx zanaflex by her pain management MD for this. There is a warning noted about using zanaflex concurrently with beta blockers. Zanaflex can increase beta blockage effect. Given the fact that there is a documented warning about using these medications together and this patient is admitted for chest pain, I will not re-order the zanaflex. An alternative muscle relaxant, flexeril and a lidoderm patch will be ordered for the chronic headache.      This was explained to the primary RN

## 2021-03-04 NOTE — PROGRESS NOTES
nasal congestion or hives. Objective:   /63   Pulse 65   Temp 98.5 °F (36.9 °C) (Oral)   Resp 18   Ht 5' (1.524 m)   Wt 123 lb 7.3 oz (56 kg)   SpO2 100%   BMI 24.11 kg/m²       Intake/Output Summary (Last 24 hours) at 3/4/2021 1002  Last data filed at 3/4/2021 0927  Gross per 24 hour   Intake 1330 ml   Output --   Net 1330 ml     Wt Readings from Last 3 Encounters:   03/04/21 123 lb 7.3 oz (56 kg)   03/01/21 123 lb (55.8 kg)   01/31/21 118 lb 2.7 oz (53.6 kg)       Physical Exam:  General: In no acute distress. Awake, alert, and oriented X4. Resting in bed  Skin:  Warm and dry. No new appearing rashes or lesions. Neck:  Supple. No JVD carotid bruit appreciated. Chest: Lungs clear to auscultation. No wheezes/rhonchi/rales  Cardiovascular:  RRR. Normal S1 and S2. No murmur/gallop or rub  Abdomen:  soft, nontender, nondistended, +bowel sounds. No hepatomegaly  Extremities:  No LE edema. No clubbing or cyanosis. 2+ bilateral radial/DP/PT pulses. Cap refill brisk.     Medications:    insulin glargine  8 Units Subcutaneous Nightly    lidocaine  1 patch Transdermal Daily    amLODIPine  5 mg Oral BID    aspirin  81 mg Oral Daily    atorvastatin  80 mg Oral Nightly    budesonide-formoterol  2 puff Inhalation Daily    clopidogrel  75 mg Oral Daily    DULoxetine  60 mg Oral Nightly    hydrALAZINE  50 mg Oral BID    metoprolol succinate  25 mg Oral BID WC    QUEtiapine  25 mg Oral Nightly    sodium chloride flush  10 mL Intravenous 2 times per day    enoxaparin  40 mg Subcutaneous Daily      dextrose       nitroGLYCERIN, cyclobenzaprine, glucose, dextrose, glucagon (rDNA), dextrose, oxyCODONE-acetaminophen, sodium chloride flush, promethazine **OR** ondansetron, polyethylene glycol, acetaminophen **OR** acetaminophen    Lab Data:  CBC:   Recent Labs     03/02/21  0655 03/03/21  0648 03/04/21  0737   WBC 4.7 4.7 5.4   HGB 10.4* 10.2* 10.2*    195 189     BMP:    Recent Labs 03/02/21  0655 03/03/21  0647 03/04/21  0736    139 138   K 4.0 4.1 4.3   CO2 20* 22 22   BUN 25* 31* 31*   CREATININE 1.3* 1.6* 1.4*     LIVR:   Recent Labs     03/01/21  1140   AST 15   ALT 7*     INR:    Recent Labs     03/01/21  1140   INR 1.02     APTT:   Recent Labs     03/01/21  1140   APTT 38.7*     Results for Dorene Stack (MRN [id number]) as of 3/4/2021 10:04   Ref. Range 3/1/2021 19:14 3/2/2021 06:55   Pro-BNP Latest Ref Range: 0 - 124 pg/mL 734 (H)    Troponin Latest Ref Range: <0.01 ng/mL  <0.01     3/1/2021 ECG:  Sinus rhythm with nonspecific ST_TW abnormalities    3/4/2021 CTA cardiac:  Pending    11/30/2020: Cardiac cath  3 Vessel CAD  Successful POBA OM1 with 3 mm balloon  FFR OM1 = 0.66  Successful PCI RPDA w/ 2.5 x 12 mm [place] DEANGELO  Patent proximal and mid LAD stents  Patent mid LCX stent  Patent proximal and distal RCA stents     12/12/2019 Lexiscan-Myoview:  No significant abnormality     [facility]:  10/11/2019 Coronary angiogram/PCI:  1. 3 v CAD; widely patent supple previous stents except for the LAD.  The   patient is presenting for planned multivessel PCI; the culprit lesion is the distal LAD de andrew stenosis  2. Successful PCI of the mid LAD ISR and distal LAD de andrew stenosis with non-overlapping Synergy drug-eluting stent  3. Successful PCI of the crux of the RCA using Synergy drug-eluting stent  4. 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 Access the right internal jugular vein, and right common   femoral vein with SonoSite ultrasound guidance. 2.  Venogram of SVC via the right IJ. 3.  Venogram of IVC via the right femoral vein.   4.  IVUS of SVC IVC and right iliac system.     10/7/2019 EDUARD:  RV and LV normal     10/4/2019 Carotid US:    < 50% stenosis of the right internal carotid artery based on velocity criteria.     o < 50% stenosis of the left internal carotid artery based on velocity criteria.     o There is antegrade flow demonstrated in the right and left vertebral arteries.     10/3/2019 Echo:  There is moderate mitral annular calcification. The mitral valve leaflets are mildly thickened in appearance. There is mild mitral regurgitation. Mild tricuspid regurgitation is present. Left atrial filling pressure is elevated based on E/E >15. Overall left ventricular ejection fraction is estimated to be 55-60%. There is mild asymmetric left ventricular hypertrophy. The left ventricular wall motion is normal.     10/2/2019 Coronary angiogram  3 vessel CAD  Normal LV function  Normal LVEDP     [facility]:  12/2018 Coronary angiogram:  1.  Left main comes from the left coronary cusp.  YAKELIN 3 flow.  No stenosis. 2.  Left anterior descending artery is patent.  Distal left anterior descending artery has a 70% stenosis. 3.  Left circumflex has patent stents.  No significant stenosis. 4.  Right coronary artery has patent stents.  No significant stenosis. 5.  LV ejection fraction 60%. SUMMARY:  Patent coronary artery stents.  Distal left anterior descending artery 70% stenosis.     7/2018 Coronary angiogram:  1.  The right coronary artery comes from the right coronary cusp.  This is  a dominant vessel.  The proximal portion had 90% stenosis.  This was  treated with a drug-coated stent, 3.0 x 8 and 3.5 x 8.  2.  The left circumflex had 90% stenosis.  This was treated with one drug-coated stent, 2.25 x 12.  We expanded up to 2.5 mm.     6/2/2016 [facility]:   1.  The left main coronary artery comes from the left coronary cusp.  It is a short left main with YAKELIN-3 flow.  No significant stenosis and gave rise to left anterior descending artery and left circumflex artery.   2.  The left anterior descending  artery gave rise to diagonal branches and had the stent present in the mid portion which was patent.  Distal LAD, left anterior descending artery has 50% stenosis present. 3.  The left circumflex comes from the left main coronary artery.  The 1st obtuse marginal has a stent present.  The 2nd obtuse marginal in the mid portion has 50% stenosis. 4.  Right coronary artery comes from right coronary cusp and the proximal portion has 75% stenosis.  It has YAKELIN-3 flow.  It is a right dominant vessel and gave rise to  posterior descending artery and posterior lateral branches. 5.  The right coronary artery FFR was 0.76. INTERVENTION PERFORMED:  We placed 1 drug-coated Xience Alpine stent 3.25 x 12 mm in dimension, achieving a final stent diameter of 3.43.         Telemetry: Sinus bradycardia-sinus rhythm with rare PAC    Assessment/Plan:    1. Chest pain  -chronic stable angina and without evidence of ischemia  -coronary CTA today  -has had PCI's in the past  -continue medical management with ASA, plavix, statin and BB  -risk factor modification    2. Headache  -Rx per Primary team  -has been a chronic issue    3. Essential HTN  -continue medical management: on multiple antihypertensives  -goal BP < 130/80    4. Paroxysmal atrial  Fibrillation  -not on long term anticoagulation d/t no recurrence  -S/P ablation x 2 in the past by Dr. Corazon Shipman  -recent CAM monitor: shows episodes of short lived atrial tach; no atrial fib noted  -remains on ASA and plavix    5. Type II diabetes mellitus  -Rx per Primary team    If CTA coronaries unremarkable and without evidence high grade stenosis will follow up as outpatient.      Electronically signed by CYRUS Pascual CNP on 3/4/2021 at 10:02 AM

## 2021-03-04 NOTE — CARE COORDINATION
[facility]  Hypoglycemia Event and Prevention Plan      NAME: Rayray Martínez  MEDICAL RECORD NUMBER:  [id number]  AGE: 60 y.o. GENDER: female  : 1955  EPISODE DATE:  3/4/2021     Data     Recent Labs     21  0747 21  1115 21  1646 21  2134 21  0743 21  0801   POCGLU 158* 189* 111* 139* 64* 91       Medications  Scheduled Medications:   insulin glargine  15 Units Subcutaneous Nightly    lidocaine  1 patch Transdermal Daily    amLODIPine  5 mg Oral BID    aspirin  81 mg Oral Daily    atorvastatin  80 mg Oral Nightly    budesonide-formoterol  2 puff Inhalation Daily    clopidogrel  75 mg Oral Daily    DULoxetine  60 mg Oral Nightly    hydrALAZINE  50 mg Oral BID    metoprolol succinate  25 mg Oral BID WC    QUEtiapine  25 mg Oral Nightly    sodium chloride flush  10 mL Intravenous 2 times per day    enoxaparin  40 mg Subcutaneous Daily       Diet  Current diet/supplement order: DIET CARDIAC; Carb Control: 4 carb choices (60 gms)/meal; Low Sodium (2 GM); Daily Fluid Restriction: 2000 ml     Recorded PO: PO Meals Eaten (%): 51 - 75% last meal in flowsheets      Action       Physician Notified of event: Yes   Kelsey Carlson MD    Recommend decreasing dose to lantus 8 - 10 units.     Responce     Achieved POCT Blood Glucose greater than 70 mg/dl: Yes      Medication plan updated: Yes      Electronically signed by Akin Rosas RN on 3/4/2021 at 8:31 AM

## 2021-03-04 NOTE — PROGRESS NOTES
CTA was not performed d/t Cr 1.4.   Will add gentle IV fluids and monitor renal function again in AM.

## 2021-03-05 ENCOUNTER — APPOINTMENT (OUTPATIENT)
Dept: CT IMAGING | Age: 66
End: 2021-03-05
Payer: COMMERCIAL

## 2021-03-05 VITALS
BODY MASS INDEX: 23.56 KG/M2 | WEIGHT: 120 LBS | OXYGEN SATURATION: 100 % | DIASTOLIC BLOOD PRESSURE: 62 MMHG | RESPIRATION RATE: 18 BRPM | HEART RATE: 67 BPM | HEIGHT: 60 IN | SYSTOLIC BLOOD PRESSURE: 137 MMHG | TEMPERATURE: 98 F

## 2021-03-05 LAB
ANION GAP SERPL CALCULATED.3IONS-SCNC: 11 MMOL/L (ref 3–16)
BASOPHILS ABSOLUTE: 0 K/UL (ref 0–0.2)
BASOPHILS RELATIVE PERCENT: 0.4 %
BUN BLDV-MCNC: 28 MG/DL (ref 7–20)
CALCIUM SERPL-MCNC: 8.6 MG/DL (ref 8.3–10.6)
CHLORIDE BLD-SCNC: 104 MMOL/L (ref 99–110)
CO2: 23 MMOL/L (ref 21–32)
CREAT SERPL-MCNC: 1.2 MG/DL (ref 0.6–1.2)
EOSINOPHILS ABSOLUTE: 0.2 K/UL (ref 0–0.6)
EOSINOPHILS RELATIVE PERCENT: 3.8 %
GFR AFRICAN AMERICAN: 55
GFR NON-AFRICAN AMERICAN: 45
GLUCOSE BLD-MCNC: 101 MG/DL (ref 70–99)
GLUCOSE BLD-MCNC: 125 MG/DL (ref 70–99)
GLUCOSE BLD-MCNC: 130 MG/DL (ref 70–99)
GLUCOSE BLD-MCNC: 87 MG/DL (ref 70–99)
GLUCOSE BLD-MCNC: 94 MG/DL (ref 70–99)
HCT VFR BLD CALC: 30 % (ref 36–48)
HEMOGLOBIN: 9.8 G/DL (ref 12–16)
LYMPHOCYTES ABSOLUTE: 1.5 K/UL (ref 1–5.1)
LYMPHOCYTES RELATIVE PERCENT: 33.7 %
MCH RBC QN AUTO: 32.4 PG (ref 26–34)
MCHC RBC AUTO-ENTMCNC: 32.7 G/DL (ref 31–36)
MCV RBC AUTO: 98.9 FL (ref 80–100)
MONOCYTES ABSOLUTE: 0.4 K/UL (ref 0–1.3)
MONOCYTES RELATIVE PERCENT: 8 %
NEUTROPHILS ABSOLUTE: 2.4 K/UL (ref 1.7–7.7)
NEUTROPHILS RELATIVE PERCENT: 54.1 %
PDW BLD-RTO: 14.7 % (ref 12.4–15.4)
PERFORMED ON: ABNORMAL
PERFORMED ON: NORMAL
PLATELET # BLD: 184 K/UL (ref 135–450)
PMV BLD AUTO: 8.8 FL (ref 5–10.5)
POTASSIUM REFLEX MAGNESIUM: 3.9 MMOL/L (ref 3.5–5.1)
RBC # BLD: 3.03 M/UL (ref 4–5.2)
SODIUM BLD-SCNC: 138 MMOL/L (ref 136–145)
WBC # BLD: 4.4 K/UL (ref 4–11)

## 2021-03-05 PROCEDURE — 36415 COLL VENOUS BLD VENIPUNCTURE: CPT

## 2021-03-05 PROCEDURE — 2580000003 HC RX 258: Performed by: FAMILY MEDICINE

## 2021-03-05 PROCEDURE — 6370000000 HC RX 637 (ALT 250 FOR IP): Performed by: FAMILY MEDICINE

## 2021-03-05 PROCEDURE — 75574 CT ANGIO HRT W/3D IMAGE: CPT

## 2021-03-05 PROCEDURE — 6360000004 HC RX CONTRAST MEDICATION: Performed by: INTERNAL MEDICINE

## 2021-03-05 PROCEDURE — 94640 AIRWAY INHALATION TREATMENT: CPT

## 2021-03-05 PROCEDURE — 99214 OFFICE O/P EST MOD 30 MIN: CPT | Performed by: NURSE PRACTITIONER

## 2021-03-05 PROCEDURE — G0378 HOSPITAL OBSERVATION PER HR: HCPCS

## 2021-03-05 PROCEDURE — 6360000002 HC RX W HCPCS: Performed by: HOSPITALIST

## 2021-03-05 PROCEDURE — 96372 THER/PROPH/DIAG INJ SC/IM: CPT

## 2021-03-05 PROCEDURE — 85025 COMPLETE CBC W/AUTO DIFF WBC: CPT

## 2021-03-05 PROCEDURE — 6370000000 HC RX 637 (ALT 250 FOR IP): Performed by: INTERNAL MEDICINE

## 2021-03-05 PROCEDURE — 6360000002 HC RX W HCPCS: Performed by: FAMILY MEDICINE

## 2021-03-05 PROCEDURE — 6370000000 HC RX 637 (ALT 250 FOR IP): Performed by: HOSPITALIST

## 2021-03-05 PROCEDURE — 80048 BASIC METABOLIC PNL TOTAL CA: CPT

## 2021-03-05 PROCEDURE — 2580000003 HC RX 258: Performed by: HOSPITALIST

## 2021-03-05 PROCEDURE — 6370000000 HC RX 637 (ALT 250 FOR IP): Performed by: NURSE PRACTITIONER

## 2021-03-05 PROCEDURE — 94761 N-INVAS EAR/PLS OXIMETRY MLT: CPT

## 2021-03-05 PROCEDURE — 96365 THER/PROPH/DIAG IV INF INIT: CPT

## 2021-03-05 RX ORDER — INSULIN GLARGINE 100 [IU]/ML
8 INJECTION, SOLUTION SUBCUTANEOUS 2 TIMES DAILY
Qty: 5 PEN | Refills: 3 | COMMUNITY
Start: 2021-03-05 | End: 2021-07-20 | Stop reason: SDUPTHER

## 2021-03-05 RX ORDER — TRAMADOL HYDROCHLORIDE 50 MG/1
50 TABLET ORAL ONCE
Status: COMPLETED | OUTPATIENT
Start: 2021-03-05 | End: 2021-03-05

## 2021-03-05 RX ORDER — NITROGLYCERIN 0.4 MG/1
0.4 TABLET SUBLINGUAL ONCE
Status: COMPLETED | OUTPATIENT
Start: 2021-03-05 | End: 2021-03-05

## 2021-03-05 RX ORDER — OMEPRAZOLE 20 MG/1
20 CAPSULE, DELAYED RELEASE ORAL DAILY
Qty: 30 CAPSULE | Refills: 1 | Status: SHIPPED | OUTPATIENT
Start: 2021-03-05 | End: 2022-06-07

## 2021-03-05 RX ADMIN — CLOPIDOGREL BISULFATE 75 MG: 75 TABLET ORAL at 10:25

## 2021-03-05 RX ADMIN — BUDESONIDE AND FORMOTEROL FUMARATE DIHYDRATE 2 PUFF: 160; 4.5 AEROSOL RESPIRATORY (INHALATION) at 12:15

## 2021-03-05 RX ADMIN — AMLODIPINE BESYLATE 5 MG: 5 TABLET ORAL at 10:25

## 2021-03-05 RX ADMIN — SODIUM CHLORIDE, PRESERVATIVE FREE 10 ML: 5 INJECTION INTRAVENOUS at 10:28

## 2021-03-05 RX ADMIN — IOPAMIDOL 75 ML: 755 INJECTION, SOLUTION INTRAVENOUS at 09:06

## 2021-03-05 RX ADMIN — NITROGLYCERIN 0.4 MG: 0.4 TABLET, ORALLY DISINTEGRATING SUBLINGUAL at 08:57

## 2021-03-05 RX ADMIN — OXYCODONE HYDROCHLORIDE AND ACETAMINOPHEN 1 TABLET: 7.5; 325 TABLET ORAL at 08:34

## 2021-03-05 RX ADMIN — ENOXAPARIN SODIUM 40 MG: 40 INJECTION SUBCUTANEOUS at 10:26

## 2021-03-05 RX ADMIN — ASPIRIN 81 MG: 81 TABLET, CHEWABLE ORAL at 10:25

## 2021-03-05 RX ADMIN — PROMETHAZINE HYDROCHLORIDE 12.5 MG: 25 INJECTION INTRAMUSCULAR; INTRAVENOUS at 13:32

## 2021-03-05 RX ADMIN — HYDRALAZINE HYDROCHLORIDE 50 MG: 50 TABLET, FILM COATED ORAL at 10:26

## 2021-03-05 RX ADMIN — TRAMADOL HYDROCHLORIDE 50 MG: 50 TABLET, FILM COATED ORAL at 11:07

## 2021-03-05 ASSESSMENT — PAIN SCALES - WONG BAKER
WONGBAKER_NUMERICALRESPONSE: 0

## 2021-03-05 ASSESSMENT — PAIN DESCRIPTION - PROGRESSION
CLINICAL_PROGRESSION: NOT CHANGED

## 2021-03-05 ASSESSMENT — PAIN SCALES - GENERAL
PAINLEVEL_OUTOF10: 10
PAINLEVEL_OUTOF10: 10
PAINLEVEL_OUTOF10: 6
PAINLEVEL_OUTOF10: 8

## 2021-03-05 ASSESSMENT — PAIN DESCRIPTION - FREQUENCY: FREQUENCY: CONTINUOUS

## 2021-03-05 ASSESSMENT — PAIN DESCRIPTION - LOCATION: LOCATION: HEAD

## 2021-03-05 ASSESSMENT — PAIN DESCRIPTION - DESCRIPTORS: DESCRIPTORS: HEADACHE

## 2021-03-05 ASSESSMENT — PAIN DESCRIPTION - PAIN TYPE: TYPE: ACUTE PAIN

## 2021-03-05 ASSESSMENT — PAIN DESCRIPTION - ONSET: ONSET: ON-GOING

## 2021-03-05 NOTE — DISCHARGE SUMMARY
Hospital Discharge Summary    Patient's PCP: Bud Paula MD  Admit Date: 3/1/2021   Discharge Date: 3/5/2021    Admitting Physician: Dr. Roxana Fallon MD  Discharge Physician: Dr. Terence Elias   Consults: cardiology    Brief HPI:/ hospital course:    60 y.o. female w/ h/o CAD stenting in 2018, HTN, DM2  presented to [facility] with left sided chest pressure for the past few days    She was treated for the following    -Chest pain/chronic stable angina--.. Negative enzymes and EKG. .. CTA coronaries showed-Suspected areas of flow limiting stenosis in the LAD, circumflex, and likely  the proximal right coronary.  Evaluation of flow within the existing coronary  artery stents is limited secondary to blooming artifact from the stent. . Findings are fairly similar given technical factors compared to 11/15/2018. Cardiology recommended continued medical management and outpatient left heart catheterization  -CAD with prior PCI in 2018, negative stress test in 2019--on Plavix, aspirin and statin  -Insulin induced hypoglycemia--significantly decreased insulin dose from 60 twice daily to 10 at night during hospitalization for blood sugars remaining on the lower normal side--needs close follow-up with PCP  -DM type II, insulin-dependent  -Hypoglycemia--insulin induced--insulin dose was significantly decreased  -KOLBY on CKD stage III -creatinine improved from 1.7-1.2 with IV fluids which is likely baseline  -Essential hypertension-stable-on amlodipine and hydralazine  -Mood disorder-on Cymbalta and Seroquel  -Chronic anemia  -Chronic daily headaches-follows with neurology- on  monthly Aimovog with no improvement. . Did not tolerate rizatriptan      Invasive procedures:  None    Discharge Diagnoses:   -Chest pain--resolved. . Negative enzymes and EKG  -CAD with prior PCI in 2018, negative stress test in 2019--on Plavix, aspirin and statin  -Insulin induced hypoglycemia--decreased insulin dose further-improved  -DM type II, insulin-dependent  -CKD stage III -baseline 1.3-1.6  -Essential hypertension-stable-on amlodipine and hydralazine  -Mood disorder-on Cymbalta and Seroquel  -Chronic anemia  -Chronic daily headaches-follows with neurology-previously on Aimovog with no improvement. . Did not tolerate rizatriptan      Physical Exam: /62   Pulse 67   Temp 98 °F (36.7 °C) (Oral)   Resp 18   Ht 5' (1.524 m)   Wt 120 lb (54.4 kg)   SpO2 100%   BMI 23.44 kg/m²   Gen/overall appearance: Not in acute distress. Alert. Head: Normocephalic, atraumatic  Eyes: EOMI, good acuity  ENT:- Oral mucosa moist  Neck: No JVD, thyromegaly  CVS: Nml S1S2, no MRG, RRR  Pulm: Clear bilaterally. No crackles/wheezes  Gastrointestinal: Soft, NT/ND, +BS  Musculoskeletal: No edema. Warm  Neuro: No focal deficit. Moves extremity spontaneously. Psychiatry: Appropriate affect. Not agitated. Skin: Warm, dry with normal turgor. No rash        Significant Diagnostic Studies:    CT coronaries  Suspected areas of flow limiting stenosis in the LAD, circumflex, and likely   the proximal right coronary.  Evaluation of flow within the existing coronary   artery stents is limited secondary to blooming artifact from the stent. .   Findings are fairly similar given technical factors compared to 11/15/2018. Chronic SVC occlusion with scattered venous collateral seen. Scattered reticular opacities throughout the lungs, suggesting mild   scarring-fibrosis       Treatments: As above.       Discharge Medications:     Medication List      CHANGE how you take these medications    Isamaraglbryant PricePen 100 UNIT/ML injection pen  Generic drug: insulin glargine  What changed:   · how much to take  · when to take this        CONTINUE taking these medications    Aimovig 70 MG/ML Soaj  Generic drug: Erenumab-aooe  INJECT 140 MLS INTO THE SKIN EVERY 30 DAYS     * albuterol sulfate  (90 Base) MCG/ACT inhaler  Commonly known as: Ventolin HFA  Inhale 2 puffs into the lungs every 4 hours as needed for Wheezing or Shortness of Breath     * albuterol (2.5 MG/3ML) 0.083% nebulizer solution  Commonly known as: PROVENTIL  Take 3 mLs by nebulization every 4 hours as needed for Wheezing     amLODIPine 5 MG tablet  Commonly known as: NORVASC  Take 1 tablet by mouth 2 times daily     aspirin 81 MG chewable tablet  Take 1 tablet by mouth daily     atorvastatin 80 MG tablet  Commonly known as: LIPITOR  Take 1 tablet by mouth nightly     budesonide-formoterol 160-4.5 MCG/ACT Aero  Commonly known as: Symbicort  Inhale 2 puffs into the lungs daily     clopidogrel 75 MG tablet  Commonly known as: PLAVIX  TAKE 1 TABLET BY MOUTH DA JULIEN     dicyclomine 20 MG tablet  Commonly known as: BENTYL     DULoxetine 60 MG extended release capsule  Commonly known as: CYMBALTA  TAKE 1 CAPSULE BY MOUTH DAILY     Ensure Liqd  Take 1 Can by mouth 3 times daily as needed (nutrition)     hydrALAZINE 50 MG tablet  Commonly known as: APRESOLINE  Take 1 tablet by mouth 2 times daily     lidocaine 5 % ointment  Commonly known as: XYLOCAINE  Apply topically as needed. metoprolol succinate 50 MG extended release tablet  Commonly known as: TOPROL XL  TAKE 0.5 TABLETS BY MOUTH 2 TIMES DAILY (WITH MEALS)     nitroGLYCERIN 0.4 MG SL tablet  Commonly known as: NITROSTAT  Place 1 tablet under the tongue every 5 minutes as needed for Chest pain up to max of 3 total doses. If no relief after 1 dose, call 911. omeprazole 20 MG delayed release capsule  Commonly known as: PRILOSEC  TAKE 1 CAPSULE BY MOUTH DAILY     ondansetron 4 MG tablet  Commonly known as: ZOFRAN  Take 1 tablet by mouth 3 times daily as needed for Nausea or Vomiting     oxyCODONE-acetaminophen 7.5-325 MG per tablet  Commonly known as: PERCOCET  Take 1 tablet by mouth every 6 hours as needed for Pain (max 3-4 day) for up to 28 days.      QUEtiapine 25 MG tablet  Commonly known as: SEROQUEL  TAKE 1-2 TABLETS BY MOUTH NIGHTLY ranolazine 1000 MG extended release tablet  Commonly known as: Ranexa  Take 1 tablet by mouth 2 times daily     rizatriptan 10 MG tablet  Commonly known as: MAXALT  Take 1 tablet by mouth once as needed for Migraine May repeat in 2 hours if needed     True Metrix Blood Glucose Test strip  Generic drug: blood glucose test strips  1 each by Does not apply route 2 times daily         * This list has 2 medication(s) that are the same as other medications prescribed for you. Read the directions carefully, and ask your doctor or other care provider to review them with you. Where to Get Your Medications      These medications were sent to [id number], [address]  [address], [address] [place] [address]    Phone: [phone]   · DULoxetine 60 MG extended release capsule  · omeprazole 20 MG delayed release capsule         Activity: activity as tolerated  Diet: DIET CARDIAC; Carb Control: 4 carb choices (60 gms)/meal; Low Sodium (2 GM); Daily Fluid Restriction: 2000 ml      Disposition: home  Discharged Condition: Stable  Follow Up:   [address], [address] [address]  [phone]    Schedule an appointment as soon as possible for a visit      Joo Gay MD  [address] [place] [address] [address] [id number]  [phone]    Schedule an appointment as soon as possible for a visit          Code status:  Full Code         Total time spent on discharge, finalizing medications, referrals and arranging outpatient follow up was more than 45 minutes      Thank you Dr. Devin Lane MD for the opportunity to be involved in this patients care.

## 2021-03-05 NOTE — PROGRESS NOTES
This RN prepared pt for discharge to home at [phone]. This RN provided discharge education regarding medication regimen, and home care. Pt. Verbalized understanding. Denies questions. No LDAs present at discharge. Discontinued IV without complications. Pt. tolerated well. Pt is being transported by *** to **** with ****Patient admitted to room *** from ***.

## 2021-03-05 NOTE — PROGRESS NOTES
patient admitted for chest pain she has CTA schedule tomm. pT NPO after midnight, her bs is 78. 8 units lantus scheduled. also she has amlodpipine 5mg, and hydralazine 50 mg. her hr is 53,BP is high for her 150/67. Np riki tubbs notified. Orders given, see flowsheet/MAR for action . Will continue to monitor.

## 2021-03-05 NOTE — PROGRESS NOTES
congestion or hives. Objective:   /61   Pulse 53   Temp 98.1 °F (36.7 °C) (Oral)   Resp 18   Ht 5' (1.524 m)   Wt 120 lb (54.4 kg)   SpO2 100%   BMI 23.44 kg/m²       Intake/Output Summary (Last 24 hours) at 3/5/2021 1010  Last data filed at 3/4/2021 1448  Gross per 24 hour   Intake 360 ml   Output --   Net 360 ml     Wt Readings from Last 3 Encounters:   03/05/21 120 lb (54.4 kg)   03/01/21 123 lb (55.8 kg)   01/31/21 118 lb 2.7 oz (53.6 kg)       Physical Exam:  General: In no acute distress. Awake, alert, and oriented X4. Resting in bed  Skin:  Warm and dry. No new appearing rashes or lesions. Neck:  Supple. No JVD appreciated. Chest: Lungs clear to auscultation. No wheezes/rhonchi/rales  Cardiovascular:  RRR. Normal S1 and S2. No murmur/gallop or rub  Abdomen:  soft, nontender, nondistended, +bowel sounds. No hepatomegaly  Extremities:  No LE edema. No clubbing or cyanosis. 2+ bilateral radial/DP/PT pulses.  Cap refill brisk    Medications:    insulin glargine  8 Units Subcutaneous Nightly    lidocaine  1 patch Transdermal Daily    amLODIPine  5 mg Oral BID    aspirin  81 mg Oral Daily    atorvastatin  80 mg Oral Nightly    budesonide-formoterol  2 puff Inhalation Daily    clopidogrel  75 mg Oral Daily    DULoxetine  60 mg Oral Nightly    hydrALAZINE  50 mg Oral BID    metoprolol succinate  25 mg Oral BID     QUEtiapine  25 mg Oral Nightly    sodium chloride flush  10 mL Intravenous 2 times per day    enoxaparin  40 mg Subcutaneous Daily      sodium chloride 50 mL/hr at 03/04/21 1807    dextrose       nitroGLYCERIN, cyclobenzaprine, glucose, dextrose, glucagon (rDNA), dextrose, oxyCODONE-acetaminophen, sodium chloride flush, promethazine **OR** ondansetron, polyethylene glycol, acetaminophen **OR** acetaminophen    Lab Data:  CBC:   Recent Labs     03/03/21  0648 03/04/21  0737 03/05/21  0740   WBC 4.7 5.4 4.4   HGB 10.2* 10.2* 9.8*    189 184     BMP:    Recent Labs 03/03/21  0647 03/04/21  0736 03/05/21  0740    138 138   K 4.1 4.3 3.9   CO2 22 22 23   BUN 31* 31* 28*   CREATININE 1.6* 1.4* 1.2     3/5/2021 CTA coronaries:  Suspected areas of flow limiting stenosis in the LAD, circumflex, and likely   the proximal right coronary.  Evaluation of flow within the existing coronary   artery stents is limited secondary to blooming artifact from the stent. .   Findings are fairly similar given technical factors compared to 11/15/2018.       Chronic SVC occlusion with scattered venous collateral seen.       Scattered reticular opacities throughout the lungs, suggesting mild   scarring-fibrosis     3/1/2021 ECG:  Sinus rhythm with nonspecific ST-TW abnormalities    11/30/2020: Cardiac cath  3 Vessel CAD  Successful POBA OM1 with 3 mm balloon  FFR OM1 = 0.66  Successful PCI RPDA w/ 2.5 x 12 mm Keyshawn DEANGELO  Patent proximal and mid LAD stents  Patent mid LCX stent  Patent proximal and distal RCA stents     12/12/2019 Lexiscan-Myoview:  No significant abnormality     [facility]:  10/11/2019 Coronary angiogram/PCI:  1. 3 v CAD; widely patent supple previous stents except for the LAD.  The   patient is presenting for planned multivessel PCI; the culprit lesion is the distal LAD de adnrew stenosis  2. Successful PCI of the mid LAD ISR and distal LAD de andrew stenosis with non-overlapping Synergy drug-eluting stent  3. Successful PCI of the crux of the RCA using Synergy drug-eluting stent  4. 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 Access the right internal jugular vein, and right common   femoral vein with SonoSite ultrasound guidance. 2.  Venogram of SVC via the right IJ.   3.  Venogram of IVC via the right femoral vein. 4.  IVUS of SVC IVC and right iliac system.     10/7/2019 EDUARD:  RV and LV normal     10/4/2019 Carotid US:    < 50% stenosis of the right internal carotid artery based on velocity criteria.     o < 50% stenosis of the left internal carotid artery based on velocity criteria.     o There is antegrade flow demonstrated in the right and left vertebral arteries.     10/3/2019 Echo:  There is moderate mitral annular calcification. The mitral valve leaflets are mildly thickened in appearance. There is mild mitral regurgitation. Mild tricuspid regurgitation is present. Left atrial filling pressure is elevated based on E/E >15. Overall left ventricular ejection fraction is estimated to be 55-60%. There is mild asymmetric left ventricular hypertrophy. The left ventricular wall motion is normal.     10/2/2019 Coronary angiogram  3 vessel CAD  Normal LV function  Normal LVEDP     [facility]:  12/2018 Coronary angiogram:  1.  Left main comes from the left coronary cusp.  YAKELIN 3 flow.  No stenosis. 2.  Left anterior descending artery is patent.  Distal left anterior descending artery has a 70% stenosis. 3.  Left circumflex has patent stents.  No significant stenosis. 4.  Right coronary artery has patent stents.  No significant stenosis. 5.  LV ejection fraction 60%.   SUMMARY:  Patent coronary artery stents.  Distal left anterior descending artery 70% stenosis.     7/2018 Coronary angiogram:  1.  The right coronary artery comes from the right coronary cusp.  This is  a dominant vessel.  The proximal portion had 90% stenosis.  This was  treated with a drug-coated stent, 3.0 x 8 and 3.5 x 8.  2.  The left circumflex had 90% stenosis.  This was treated with one drug-coated stent, 2.25 x 12.  We expanded up to 2.5 mm.     6/2/2016 [facility]:   1.  The left main coronary artery comes from the left coronary cusp.  It is a short left main with YAKELIN-3 flow.  No significant stenosis and gave rise to left anterior descending artery and left circumflex artery. 2.  The left anterior descending  artery gave rise to diagonal branches and had the stent present in the mid portion which was patent.  Distal LAD, left anterior descending artery has 50% stenosis present. 3.  The left circumflex comes from the left main coronary artery.  The 1st obtuse marginal has a stent present.  The 2nd obtuse marginal in the mid portion has 50% stenosis. 4.  Right coronary artery comes from right coronary cusp and the proximal portion has 75% stenosis.  It has YAKELIN-3 flow.  It is a right dominant vessel and gave rise to  posterior descending artery and posterior lateral branches. 5.  The right coronary artery FFR was 0.76. INTERVENTION PERFORMED:  We placed 1 drug-coated Xience Alpine stent 3.25 x 12 mm in dimension, achieving a final stent diameter of 3.43.        Telemetry: Sinus bradycardia-sinus rhythm    Assessment/Plan:    1. Chest pain  -chronic stable angina and without evidence of ischemia  -coronary CTA inconclusive: d/w Dr. Yovani Cedillo; will plan for outpt cardiac cath  -has had PCI's in the past  -continue medical management with ASA, plavix, statin and BB     2. Headache  -chronic and treatment per Primary team     3. Essential HTN  -better controlled  -goal BP < 130/80  -continue medical management     4. Paroxysmal atrial  Fibrillation  -not on long term anticoagulation d/t no recurrence  -S/P ablation x 2 in the past by Dr. Abisai Schumacher  -recent CAM monitor: shows episodes of short lived atrial tach; no atrial fib noted  -on Toprol     5. Type II diabetes mellitus  -Rx per Primary team    Stable from cardiac standpoint and ok to discharge    Follow up with D. Enzweiler, CNP on 3/10/2021 @ 11:40 AM (will arrange for cardiac cath at that visit)    Discharge Cardiac Meds:  Amlodipine 5 mg BID  ASA 81 mg daily  Atorvastatin 80 mg nightly  Plavix 75 mg daily  Hydralazine 50 mg BID  Toprol XL 25 mg BID      Electronically signed by Roy Alpers, APRN

## 2021-03-09 ENCOUNTER — TELEPHONE (OUTPATIENT)
Dept: PRIMARY CARE CLINIC | Age: 66
End: 2021-03-09

## 2021-03-09 ENCOUNTER — TELEPHONE (OUTPATIENT)
Dept: CARDIOLOGY CLINIC | Age: 66
End: 2021-03-09

## 2021-03-09 ENCOUNTER — APPOINTMENT (OUTPATIENT)
Dept: GENERAL RADIOLOGY | Age: 66
End: 2021-03-09
Payer: COMMERCIAL

## 2021-03-09 ENCOUNTER — HOSPITAL ENCOUNTER (OUTPATIENT)
Age: 66
Setting detail: OBSERVATION
Discharge: HOME | End: 2021-03-11
Attending: EMERGENCY MEDICINE | Admitting: INTERNAL MEDICINE
Payer: COMMERCIAL

## 2021-03-09 DIAGNOSIS — R07.9 CHEST PAIN, UNSPECIFIED TYPE: Primary | ICD-10-CM

## 2021-03-09 LAB
A/G RATIO: 1 (ref 1.1–2.2)
ALBUMIN SERPL-MCNC: 3.9 G/DL (ref 3.4–5)
ALP BLD-CCNC: 115 U/L (ref 40–129)
ALT SERPL-CCNC: 16 U/L (ref 10–40)
ANION GAP SERPL CALCULATED.3IONS-SCNC: 12 MMOL/L (ref 3–16)
APTT: 149.6 SEC (ref 24.2–36.2)
AST SERPL-CCNC: 18 U/L (ref 15–37)
BASOPHILS ABSOLUTE: 0 K/UL (ref 0–0.2)
BASOPHILS RELATIVE PERCENT: 0.4 %
BILIRUB SERPL-MCNC: 0.3 MG/DL (ref 0–1)
BUN BLDV-MCNC: 16 MG/DL (ref 7–20)
CALCIUM SERPL-MCNC: 9.7 MG/DL (ref 8.3–10.6)
CHLORIDE BLD-SCNC: 105 MMOL/L (ref 99–110)
CO2: 25 MMOL/L (ref 21–32)
CREAT SERPL-MCNC: 1.3 MG/DL (ref 0.6–1.2)
EOSINOPHILS ABSOLUTE: 0.1 K/UL (ref 0–0.6)
EOSINOPHILS RELATIVE PERCENT: 2.7 %
GFR AFRICAN AMERICAN: 50
GFR NON-AFRICAN AMERICAN: 41
GLOBULIN: 3.8 G/DL
GLUCOSE BLD-MCNC: 120 MG/DL (ref 70–99)
GLUCOSE BLD-MCNC: 177 MG/DL (ref 70–99)
GLUCOSE BLD-MCNC: 195 MG/DL (ref 70–99)
HCT VFR BLD CALC: 27.1 % (ref 36–48)
HCT VFR BLD CALC: 29.4 % (ref 36–48)
HEMOGLOBIN: 8.8 G/DL (ref 12–16)
HEMOGLOBIN: 9.6 G/DL (ref 12–16)
LYMPHOCYTES ABSOLUTE: 1.1 K/UL (ref 1–5.1)
LYMPHOCYTES RELATIVE PERCENT: 22.5 %
MCH RBC QN AUTO: 32 PG (ref 26–34)
MCH RBC QN AUTO: 32.2 PG (ref 26–34)
MCHC RBC AUTO-ENTMCNC: 32.7 G/DL (ref 31–36)
MCHC RBC AUTO-ENTMCNC: 32.7 G/DL (ref 31–36)
MCV RBC AUTO: 97.8 FL (ref 80–100)
MCV RBC AUTO: 98.2 FL (ref 80–100)
MONOCYTES ABSOLUTE: 0.2 K/UL (ref 0–1.3)
MONOCYTES RELATIVE PERCENT: 4.8 %
NEUTROPHILS ABSOLUTE: 3.3 K/UL (ref 1.7–7.7)
NEUTROPHILS RELATIVE PERCENT: 69.6 %
PDW BLD-RTO: 14.7 % (ref 12.4–15.4)
PDW BLD-RTO: 14.9 % (ref 12.4–15.4)
PERFORMED ON: ABNORMAL
PERFORMED ON: ABNORMAL
PLATELET # BLD: 185 K/UL (ref 135–450)
PLATELET # BLD: 190 K/UL (ref 135–450)
PLATELET SLIDE REVIEW: ADEQUATE
PMV BLD AUTO: 8.5 FL (ref 5–10.5)
PMV BLD AUTO: 9.1 FL (ref 5–10.5)
POTASSIUM REFLEX MAGNESIUM: 4.3 MMOL/L (ref 3.5–5.1)
RBC # BLD: 2.77 M/UL (ref 4–5.2)
RBC # BLD: 2.99 M/UL (ref 4–5.2)
REASON FOR REJECTION: NORMAL
REJECTED TEST: NORMAL
SLIDE REVIEW: ABNORMAL
SODIUM BLD-SCNC: 142 MMOL/L (ref 136–145)
TOTAL PROTEIN: 7.7 G/DL (ref 6.4–8.2)
TROPONIN: <0.01 NG/ML
WBC # BLD: 4.8 K/UL (ref 4–11)
WBC # BLD: 4.9 K/UL (ref 4–11)

## 2021-03-09 PROCEDURE — 2500000003 HC RX 250 WO HCPCS: Performed by: INTERNAL MEDICINE

## 2021-03-09 PROCEDURE — 6370000000 HC RX 637 (ALT 250 FOR IP): Performed by: INTERNAL MEDICINE

## 2021-03-09 PROCEDURE — 6360000002 HC RX W HCPCS: Performed by: INTERNAL MEDICINE

## 2021-03-09 PROCEDURE — G0378 HOSPITAL OBSERVATION PER HR: HCPCS

## 2021-03-09 PROCEDURE — 36415 COLL VENOUS BLD VENIPUNCTURE: CPT

## 2021-03-09 PROCEDURE — 80053 COMPREHEN METABOLIC PANEL: CPT

## 2021-03-09 PROCEDURE — 85025 COMPLETE CBC W/AUTO DIFF WBC: CPT

## 2021-03-09 PROCEDURE — 83036 HEMOGLOBIN GLYCOSYLATED A1C: CPT

## 2021-03-09 PROCEDURE — 96376 TX/PRO/DX INJ SAME DRUG ADON: CPT

## 2021-03-09 PROCEDURE — 94640 AIRWAY INHALATION TREATMENT: CPT

## 2021-03-09 PROCEDURE — 93005 ELECTROCARDIOGRAM TRACING: CPT | Performed by: EMERGENCY MEDICINE

## 2021-03-09 PROCEDURE — 85730 THROMBOPLASTIN TIME PARTIAL: CPT

## 2021-03-09 PROCEDURE — 84484 ASSAY OF TROPONIN QUANT: CPT

## 2021-03-09 PROCEDURE — 71046 X-RAY EXAM CHEST 2 VIEWS: CPT

## 2021-03-09 PROCEDURE — 96374 THER/PROPH/DIAG INJ IV PUSH: CPT

## 2021-03-09 PROCEDURE — 94761 N-INVAS EAR/PLS OXIMETRY MLT: CPT

## 2021-03-09 PROCEDURE — 99284 EMERGENCY DEPT VISIT MOD MDM: CPT

## 2021-03-09 PROCEDURE — 6360000002 HC RX W HCPCS: Performed by: EMERGENCY MEDICINE

## 2021-03-09 PROCEDURE — 85027 COMPLETE CBC AUTOMATED: CPT

## 2021-03-09 RX ORDER — DIVALPROEX SODIUM 250 MG/1
250 TABLET, EXTENDED RELEASE ORAL 3 TIMES DAILY
Status: DISCONTINUED | OUTPATIENT
Start: 2021-03-09 | End: 2021-03-11 | Stop reason: HOSPADM

## 2021-03-09 RX ORDER — ASPIRIN 81 MG/1
81 TABLET, CHEWABLE ORAL DAILY
Status: DISCONTINUED | OUTPATIENT
Start: 2021-03-10 | End: 2021-03-11 | Stop reason: HOSPADM

## 2021-03-09 RX ORDER — TORSEMIDE 10 MG/1
10 TABLET ORAL DAILY
COMMUNITY
End: 2021-11-02

## 2021-03-09 RX ORDER — DEXTROSE MONOHYDRATE 50 MG/ML
100 INJECTION, SOLUTION INTRAVENOUS PRN
Status: DISCONTINUED | OUTPATIENT
Start: 2021-03-09 | End: 2021-03-11 | Stop reason: HOSPADM

## 2021-03-09 RX ORDER — NICOTINE POLACRILEX 4 MG
15 LOZENGE BUCCAL PRN
Status: DISCONTINUED | OUTPATIENT
Start: 2021-03-09 | End: 2021-03-11 | Stop reason: HOSPADM

## 2021-03-09 RX ORDER — SODIUM CHLORIDE 0.9 % (FLUSH) 0.9 %
10 SYRINGE (ML) INJECTION EVERY 12 HOURS SCHEDULED
Status: DISCONTINUED | OUTPATIENT
Start: 2021-03-09 | End: 2021-03-11 | Stop reason: HOSPADM

## 2021-03-09 RX ORDER — DICYCLOMINE HCL 20 MG
20 TABLET ORAL
Status: DISCONTINUED | OUTPATIENT
Start: 2021-03-09 | End: 2021-03-11 | Stop reason: HOSPADM

## 2021-03-09 RX ORDER — MORPHINE SULFATE 2 MG/ML
2 INJECTION, SOLUTION INTRAMUSCULAR; INTRAVENOUS ONCE
Status: COMPLETED | OUTPATIENT
Start: 2021-03-09 | End: 2021-03-09

## 2021-03-09 RX ORDER — TIZANIDINE 4 MG/1
4 TABLET ORAL DAILY PRN
COMMUNITY
End: 2021-04-16

## 2021-03-09 RX ORDER — HEPARIN SODIUM 1000 [USP'U]/ML
3200 INJECTION, SOLUTION INTRAVENOUS; SUBCUTANEOUS ONCE
Status: COMPLETED | OUTPATIENT
Start: 2021-03-09 | End: 2021-03-09

## 2021-03-09 RX ORDER — PANTOPRAZOLE SODIUM 40 MG/1
40 TABLET, DELAYED RELEASE ORAL
Status: DISCONTINUED | OUTPATIENT
Start: 2021-03-10 | End: 2021-03-11 | Stop reason: HOSPADM

## 2021-03-09 RX ORDER — ACETAMINOPHEN 325 MG/1
650 TABLET ORAL EVERY 6 HOURS PRN
Status: DISCONTINUED | OUTPATIENT
Start: 2021-03-09 | End: 2021-03-11 | Stop reason: HOSPADM

## 2021-03-09 RX ORDER — NITROGLYCERIN 0.4 MG/1
0.4 TABLET SUBLINGUAL EVERY 5 MIN PRN
Status: DISCONTINUED | OUTPATIENT
Start: 2021-03-09 | End: 2021-03-11 | Stop reason: HOSPADM

## 2021-03-09 RX ORDER — BUDESONIDE AND FORMOTEROL FUMARATE DIHYDRATE 160; 4.5 UG/1; UG/1
2 AEROSOL RESPIRATORY (INHALATION) DAILY
Status: DISCONTINUED | OUTPATIENT
Start: 2021-03-09 | End: 2021-03-11 | Stop reason: HOSPADM

## 2021-03-09 RX ORDER — DULOXETIN HYDROCHLORIDE 60 MG/1
60 CAPSULE, DELAYED RELEASE ORAL DAILY
Status: DISCONTINUED | OUTPATIENT
Start: 2021-03-09 | End: 2021-03-11 | Stop reason: HOSPADM

## 2021-03-09 RX ORDER — AMLODIPINE BESYLATE 5 MG/1
5 TABLET ORAL 2 TIMES DAILY
Status: DISCONTINUED | OUTPATIENT
Start: 2021-03-09 | End: 2021-03-11 | Stop reason: HOSPADM

## 2021-03-09 RX ORDER — ALBUTEROL SULFATE 2.5 MG/3ML
2.5 SOLUTION RESPIRATORY (INHALATION) EVERY 4 HOURS PRN
Status: DISCONTINUED | OUTPATIENT
Start: 2021-03-09 | End: 2021-03-11 | Stop reason: HOSPADM

## 2021-03-09 RX ORDER — HEPARIN SODIUM 10000 [USP'U]/100ML
3.3 INJECTION, SOLUTION INTRAVENOUS CONTINUOUS
Status: DISCONTINUED | OUTPATIENT
Start: 2021-03-09 | End: 2021-03-10

## 2021-03-09 RX ORDER — HEPARIN SODIUM 1000 [USP'U]/ML
30 INJECTION, SOLUTION INTRAVENOUS; SUBCUTANEOUS PRN
Status: DISCONTINUED | OUTPATIENT
Start: 2021-03-09 | End: 2021-03-09

## 2021-03-09 RX ORDER — CLOPIDOGREL BISULFATE 75 MG/1
75 TABLET ORAL DAILY
Status: DISCONTINUED | OUTPATIENT
Start: 2021-03-09 | End: 2021-03-11 | Stop reason: HOSPADM

## 2021-03-09 RX ORDER — ATORVASTATIN CALCIUM 80 MG/1
80 TABLET, FILM COATED ORAL NIGHTLY
Status: DISCONTINUED | OUTPATIENT
Start: 2021-03-09 | End: 2021-03-11 | Stop reason: HOSPADM

## 2021-03-09 RX ORDER — PROMETHAZINE HYDROCHLORIDE 25 MG/1
12.5 TABLET ORAL EVERY 6 HOURS PRN
Status: DISCONTINUED | OUTPATIENT
Start: 2021-03-09 | End: 2021-03-11 | Stop reason: HOSPADM

## 2021-03-09 RX ORDER — HEPARIN SODIUM 1000 [USP'U]/ML
60 INJECTION, SOLUTION INTRAVENOUS; SUBCUTANEOUS PRN
Status: DISCONTINUED | OUTPATIENT
Start: 2021-03-09 | End: 2021-03-09

## 2021-03-09 RX ORDER — DIVALPROEX SODIUM 250 MG/1
250 TABLET, EXTENDED RELEASE ORAL 2 TIMES DAILY
COMMUNITY
End: 2021-04-06 | Stop reason: SINTOL

## 2021-03-09 RX ORDER — OXYCODONE AND ACETAMINOPHEN 7.5; 325 MG/1; MG/1
1 TABLET ORAL EVERY 6 HOURS PRN
Status: DISCONTINUED | OUTPATIENT
Start: 2021-03-09 | End: 2021-03-11 | Stop reason: HOSPADM

## 2021-03-09 RX ORDER — INSULIN GLARGINE 100 [IU]/ML
8 INJECTION, SOLUTION SUBCUTANEOUS NIGHTLY
Status: DISCONTINUED | OUTPATIENT
Start: 2021-03-09 | End: 2021-03-11 | Stop reason: HOSPADM

## 2021-03-09 RX ORDER — ACETAMINOPHEN 650 MG/1
650 SUPPOSITORY RECTAL EVERY 6 HOURS PRN
Status: DISCONTINUED | OUTPATIENT
Start: 2021-03-09 | End: 2021-03-11 | Stop reason: HOSPADM

## 2021-03-09 RX ORDER — SODIUM CHLORIDE 0.9 % (FLUSH) 0.9 %
10 SYRINGE (ML) INJECTION PRN
Status: DISCONTINUED | OUTPATIENT
Start: 2021-03-09 | End: 2021-03-11 | Stop reason: HOSPADM

## 2021-03-09 RX ORDER — DEXTROSE MONOHYDRATE 25 G/50ML
12.5 INJECTION, SOLUTION INTRAVENOUS PRN
Status: DISCONTINUED | OUTPATIENT
Start: 2021-03-09 | End: 2021-03-11 | Stop reason: HOSPADM

## 2021-03-09 RX ORDER — METOPROLOL SUCCINATE 25 MG/1
25 TABLET, EXTENDED RELEASE ORAL 2 TIMES DAILY WITH MEALS
Status: DISCONTINUED | OUTPATIENT
Start: 2021-03-09 | End: 2021-03-11 | Stop reason: HOSPADM

## 2021-03-09 RX ORDER — RANOLAZINE 500 MG/1
1000 TABLET, EXTENDED RELEASE ORAL 2 TIMES DAILY
Status: DISCONTINUED | OUTPATIENT
Start: 2021-03-09 | End: 2021-03-11 | Stop reason: HOSPADM

## 2021-03-09 RX ORDER — ONDANSETRON 2 MG/ML
4 INJECTION INTRAMUSCULAR; INTRAVENOUS EVERY 6 HOURS PRN
Status: DISCONTINUED | OUTPATIENT
Start: 2021-03-09 | End: 2021-03-11 | Stop reason: HOSPADM

## 2021-03-09 RX ORDER — INSULIN ASPART 100 [IU]/ML
3 INJECTION, SOLUTION INTRAVENOUS; SUBCUTANEOUS
COMMUNITY
End: 2021-07-20 | Stop reason: SDUPTHER

## 2021-03-09 RX ORDER — POLYETHYLENE GLYCOL 3350 17 G/17G
17 POWDER, FOR SOLUTION ORAL DAILY PRN
Status: DISCONTINUED | OUTPATIENT
Start: 2021-03-09 | End: 2021-03-11 | Stop reason: HOSPADM

## 2021-03-09 RX ORDER — QUETIAPINE FUMARATE 25 MG/1
25 TABLET, FILM COATED ORAL NIGHTLY
Status: DISCONTINUED | OUTPATIENT
Start: 2021-03-09 | End: 2021-03-11 | Stop reason: HOSPADM

## 2021-03-09 RX ORDER — HYDRALAZINE HYDROCHLORIDE 50 MG/1
50 TABLET, FILM COATED ORAL 2 TIMES DAILY
Status: DISCONTINUED | OUTPATIENT
Start: 2021-03-09 | End: 2021-03-11 | Stop reason: HOSPADM

## 2021-03-09 RX ORDER — SULFAMETHOXAZOLE AND TRIMETHOPRIM 800; 160 MG/1; MG/1
1 TABLET ORAL 2 TIMES DAILY
Qty: 10 TABLET | Refills: 0 | Status: SHIPPED | OUTPATIENT
Start: 2021-03-09 | End: 2021-03-14

## 2021-03-09 RX ORDER — ALBUTEROL SULFATE 90 UG/1
2 AEROSOL, METERED RESPIRATORY (INHALATION) EVERY 4 HOURS PRN
Status: DISCONTINUED | OUTPATIENT
Start: 2021-03-09 | End: 2021-03-11 | Stop reason: HOSPADM

## 2021-03-09 RX ORDER — TORSEMIDE 20 MG/1
10 TABLET ORAL DAILY
Status: DISCONTINUED | OUTPATIENT
Start: 2021-03-10 | End: 2021-03-11 | Stop reason: HOSPADM

## 2021-03-09 RX ADMIN — NITROGLYCERIN 0.4 MG: 0.4 TABLET, ORALLY DISINTEGRATING SUBLINGUAL at 19:57

## 2021-03-09 RX ADMIN — HEPARIN SODIUM 3200 UNITS: 1000 INJECTION INTRAVENOUS; SUBCUTANEOUS at 16:10

## 2021-03-09 RX ADMIN — RANOLAZINE 1000 MG: 500 TABLET, FILM COATED, EXTENDED RELEASE ORAL at 21:05

## 2021-03-09 RX ADMIN — DICYCLOMINE HYDROCHLORIDE 20 MG: 20 TABLET ORAL at 21:06

## 2021-03-09 RX ADMIN — HYDRALAZINE HYDROCHLORIDE 50 MG: 50 TABLET, FILM COATED ORAL at 21:06

## 2021-03-09 RX ADMIN — AMLODIPINE BESYLATE 5 MG: 5 TABLET ORAL at 21:06

## 2021-03-09 RX ADMIN — MORPHINE SULFATE 2 MG: 2 INJECTION, SOLUTION INTRAMUSCULAR; INTRAVENOUS at 13:34

## 2021-03-09 RX ADMIN — CLOPIDOGREL BISULFATE 75 MG: 75 TABLET ORAL at 16:34

## 2021-03-09 RX ADMIN — HEPARIN SODIUM 6.5 ML/HR: 10000 INJECTION, SOLUTION INTRAVENOUS at 16:10

## 2021-03-09 RX ADMIN — DICYCLOMINE HYDROCHLORIDE 20 MG: 20 TABLET ORAL at 16:10

## 2021-03-09 RX ADMIN — NITROGLYCERIN 0.4 MG: 0.4 TABLET, ORALLY DISINTEGRATING SUBLINGUAL at 16:17

## 2021-03-09 RX ADMIN — QUETIAPINE FUMARATE 25 MG: 25 TABLET ORAL at 21:05

## 2021-03-09 RX ADMIN — MORPHINE SULFATE 2 MG: 2 INJECTION, SOLUTION INTRAMUSCULAR; INTRAVENOUS at 12:52

## 2021-03-09 RX ADMIN — OXYCODONE HYDROCHLORIDE AND ACETAMINOPHEN 1 TABLET: 7.5; 325 TABLET ORAL at 21:16

## 2021-03-09 RX ADMIN — DIVALPROEX SODIUM 250 MG: 250 TABLET, EXTENDED RELEASE ORAL at 16:40

## 2021-03-09 RX ADMIN — DULOXETINE HYDROCHLORIDE 60 MG: 60 CAPSULE, DELAYED RELEASE ORAL at 18:03

## 2021-03-09 RX ADMIN — NITROGLYCERIN 0.4 MG: 0.4 TABLET, ORALLY DISINTEGRATING SUBLINGUAL at 15:16

## 2021-03-09 RX ADMIN — BUDESONIDE AND FORMOTEROL FUMARATE DIHYDRATE 2 PUFF: 160; 4.5 AEROSOL RESPIRATORY (INHALATION) at 15:42

## 2021-03-09 RX ADMIN — DIVALPROEX SODIUM 250 MG: 250 TABLET, EXTENDED RELEASE ORAL at 21:16

## 2021-03-09 RX ADMIN — INSULIN GLARGINE 8 UNITS: 100 INJECTION, SOLUTION SUBCUTANEOUS at 21:05

## 2021-03-09 RX ADMIN — INSULIN LISPRO 1 UNITS: 100 INJECTION, SOLUTION INTRAVENOUS; SUBCUTANEOUS at 21:05

## 2021-03-09 RX ADMIN — NITROGLYCERIN 0.4 MG: 0.4 TABLET, ORALLY DISINTEGRATING SUBLINGUAL at 20:07

## 2021-03-09 RX ADMIN — ATORVASTATIN CALCIUM 80 MG: 80 TABLET, FILM COATED ORAL at 21:05

## 2021-03-09 RX ADMIN — METOPROLOL SUCCINATE 25 MG: 25 TABLET, EXTENDED RELEASE ORAL at 16:10

## 2021-03-09 ASSESSMENT — PAIN DESCRIPTION - ORIENTATION: ORIENTATION: MID

## 2021-03-09 ASSESSMENT — PAIN SCALES - WONG BAKER
WONGBAKER_NUMERICALRESPONSE: 0
WONGBAKER_NUMERICALRESPONSE: 0

## 2021-03-09 ASSESSMENT — PAIN DESCRIPTION - PAIN TYPE: TYPE: ACUTE PAIN

## 2021-03-09 ASSESSMENT — PAIN DESCRIPTION - LOCATION: LOCATION: CHEST

## 2021-03-09 ASSESSMENT — PAIN DESCRIPTION - PROGRESSION
CLINICAL_PROGRESSION: NOT CHANGED
CLINICAL_PROGRESSION: NOT CHANGED

## 2021-03-09 ASSESSMENT — PAIN DESCRIPTION - DESCRIPTORS
DESCRIPTORS: HEAVINESS
DESCRIPTORS: HEAVINESS

## 2021-03-09 ASSESSMENT — PAIN DESCRIPTION - ONSET: ONSET: ON-GOING

## 2021-03-09 NOTE — PROGRESS NOTES
Medication Reconciliation     List of medications patient is currently taking is complete. Source of information:   1. Conversation with patient at bedside  2. EPIC records      Allergies  Patient has no known allergies. Notes regarding home medications:   1. Patient received her blood pressure meds, aspirin, and Ranexa prior to arrival to the emergency department today  2. Added on divalproex ER, Novolog, torsemide, and tizanidine to med list  3. Patient claims to still take rizatriptan prn  4. Patient claims they are due for their next Aimovig dose  5. Patient states they take divalproex TID (what they were prescribed in the past); most recent script is BID  6. Patient \"believes\" Novolog is dosed BID, most recent script dosed for TID  7.  Patient was precribed a 5 day course of Bactrim today, seemed to not be aware of it    Alejandra Becker, Pharmacy Intern  3/9/2021 2:42 PM

## 2021-03-09 NOTE — PROGRESS NOTES
Patient has arrived to the unit from the ED. Patient is resting in bed, awake and quiet. Patient is A&O. Room air. Side rails are up x2. Fall precautions are in place. Call light, telephone and bedside table are within reach. VSS taken. Patient oriented to room and use of call light. Needs met. Will continue to continue patient per unit protocols.  Electronically signed by Rashi Voss RN on 3/9/2021 at 2:41 PM

## 2021-03-09 NOTE — ED NOTES
Pt arrived tot he ED via EMS, pt states that she started having chest pain this am, pt took 3 nitro and 4 asa prior to ED arrival, pt states that she is suppose to follow up with Dr Fanta Bliss this Friday and called his office and was told to come to the ED, vss afebrile alert and orient,      Russell Espitia, YOLETTE  03/09/21 [id number]

## 2021-03-09 NOTE — H&P
Hospital Medicine History & Physical      PCP: Helena Slade MD    Date of Admission: 3/9/2021    Date of Service: Pt seen/examined on 03/09/2021 and Placed in Observation. Chief Complaint:  Chest pain       History Of Present Illness:      60 y.o. female who presented to [facility] with chest pain, pain started at 9 9:30 AM, left-sided radiating to her back and her neck 8 out of 10 intensity dull, pressure-like no obvious alleviating or aggravating factors beside nitroglycerin improved the pain but never went away, came to emergency department in route she got nitroglycerin with partial relief in emergency department EKG nonspecific, patient felt better, denies any shortness of breath nausea vomiting or abdominal pain.   Patient being admitted for further management and treatment    Past Medical History:          Diagnosis Date    Acid reflux     Anemia     Anxiety     Arthritis     Asthma     Atrial fibrillation ([address]     Blood transfusion reaction     CAD (coronary artery disease) 12/3/2012    Cerebral artery occlusion with cerebral infarction ([address]     [facility]est[facility]     Chronic kidney disease     40% kidney functiom    COPD (chronic obstructive pulmonary disease) ([facility])     Depression     DM2 (diabetes mellitus, type 2) ([address]     Dysarthria     Fibromyalgia 6/7/2016    Headache(784.0) 2/19/2013    Hemisensory loss     Hyperlipidemia     Hypertension     IBS (irritable bowel syndrome)     Inferior vena cava occlusion ([facility])     Irritable bowel syndrome     Keratitis     MI, old     Neuropathy     Superior vena cava obstruction     Temporal arteritis ([address] 2/24/2014       Past Surgical History:          Procedure Laterality Date    ABLATION OF DYSRHYTHMIC FOCUS      ARTERY BIOPSY Right 04/23/2014    RIGHT TEMPORAL ARTERY BIOPSY    CATARACT REMOVAL Bilateral     CHOLECYSTECTOMY      CORONARY ANGIOPLASTY WITH STENT PLACEMENT      CORONARY ANGIOPLASTY WITH STENT PLACEMENT      HYSTERECTOMY      JOINT REPLACEMENT Right     KNEE ARTHROSCOPY Right     KNEE SURGERY      right knee replacement    PTCA  10/2019    LAD and RCA inrtervention    TUNNELED VENOUS PORT PLACEMENT      left thigh. SMART PORT    UPPER GASTROINTESTINAL ENDOSCOPY N/A 7/6/2020    EGD DIAGNOSTIC ONLY performed by Rose Franco MD at [address]         Medications Prior to Admission:      Prior to Admission medications    Medication Sig Start Date End Date Taking? Authorizing Provider   sulfamethoxazole-trimethoprim (BACTRIM DS;SEPTRA DS) 800-160 MG per tablet Take 1 tablet by mouth 2 times daily for 5 days 3/9/21 3/14/21  Charlotte Dalal MD   omeprazole (PRILOSEC) 20 MG delayed release capsule TAKE 1 CAPSULE BY MOUTH DAILY 3/5/21   Charlotte Dalal MD   insulin glargine [facility]) 100 UNIT/ML injection pen Inject 8 Units into the skin nightly 3/5/21   Andreas Copeland MD   DULoxetine (CYMBALTA) 60 MG extended release capsule TAKE 1 CAPSULE BY MOUTH DAILY 3/4/21   Joel Dumont MD   oxyCODONE-acetaminophen (PERCOCET) 7.5-325 MG per tablet Take 1 tablet by mouth every 6 hours as needed for Pain (max 3-4 day) for up to 28 days.  2/19/21 3/19/21  Joel Dumont MD   metoprolol succinate (TOPROL XL) 50 MG extended release tablet TAKE 0.5 TABLETS BY MOUTH 2 TIMES DAILY (WITH MEALS) 2/5/21   CYRUS Crowell   rizatriptan (MAXALT) 10 MG tablet Take 1 tablet by mouth once as needed for Migraine May repeat in 2 hours if needed 2/2/21 3/1/21  Joel Dumont MD   AIMOVIG 70 MG/ML SOAJ INJECT 140 MLS INTO THE SKIN EVERY 30 DAYS 2/2/21   Joel Dumont MD   QUEtiapine (SEROQUEL) 25 MG tablet TAKE 1-2 TABLETS BY MOUTH NIGHTLY 2/2/21   Joel Dumont MD   aspirin 81 MG chewable tablet Take 1 tablet by mouth daily 12/24/20   Katy Kruger MD   dicyclomine (BENTYL) 20 MG tablet Take 20 mg by mouth 4 times daily (before meals and nightly)  12/9/20   Historical Provider, MD   nitroGLYCERIN (NITROSTAT) 0.4 MG SL tablet Place 1 tablet under the tongue every 5 minutes as needed for Chest pain up to max of 3 total doses. If no relief after 1 dose, call 911. 12/16/20   Daphne Mccall MD   Nutritional Supplements (ENSURE) LIQD Take 1 Can by mouth 3 times daily as needed (nutrition) 12/16/20   Daphne Mccall MD   hydrALAZINE (APRESOLINE) 50 MG tablet Take 1 tablet by mouth 2 times daily 10/1/20   Daphne Mccall MD   atorvastatin (LIPITOR) 80 MG tablet Take 1 tablet by mouth nightly 10/1/20   Daphne Mccall MD   amLODIPine (NORVASC) 5 MG tablet Take 1 tablet by mouth 2 times daily 10/1/20   Daphne Mccall MD   lidocaine (XYLOCAINE) 5 % ointment Apply topically as needed. 10/1/20   Daphne Mccall MD   blood glucose test strips (TRUE METRIX BLOOD GLUCOSE TEST) strip 1 each by Does not apply route 2 times daily 9/25/20   Daphne Mccall MD   ondansetron (ZOFRAN) 4 MG tablet Take 1 tablet by mouth 3 times daily as needed for Nausea or Vomiting 9/23/20   Daphne Mccall MD   clopidogrel (PLAVIX) 75 MG tablet TAKE 1 TABLET BY MOUTH DA JULIEN 9/17/20   Daphne Mccall MD   albuterol (PROVENTIL) (2.5 MG/3ML) 0.083% nebulizer solution Take 3 mLs by nebulization every 4 hours as needed for Wheezing 3/25/20   Daphne Mccall MD   budesonide-formoterol (SYMBICORT) 160-4.5 MCG/ACT AERO Inhale 2 puffs into the lungs daily 12/6/19   Daphne Mccall MD   albuterol sulfate HFA (VENTOLIN HFA) 108 (90 Base) MCG/ACT inhaler Inhale 2 puffs into the lungs every 4 hours as needed for Wheezing or Shortness of Breath 12/6/19   Daphne Mccall MD   ranolazine (RANEXA) 1000 MG extended release tablet Take 1 tablet by mouth 2 times daily 9/10/19   Daphne Mccall MD       Allergies:  Patient has no known allergies. Social History:      The patient currently lives at home    TOBACCO:   reports that she quit smoking about 2 years ago. Her smoking use included cigarettes.  She has a 17.50 pack-year smoking history. She has quit using smokeless tobacco.  ETOH:   reports no history of alcohol use. E-Cigarettes/Vaping Use     Questions Responses    E-Cigarette/Vaping Use Never User    Start Date     Passive Exposure No    Quit Date     Counseling Given Yes    Comments             Family History:      Reviewed in detail and positive as follows:        Problem Relation Age of Onset    Diabetes Mother     Hypertension Mother     High Cholesterol Mother     Stroke Mother     Cancer Mother     No Known Problems Paternal Grandfather         lung issues        REVIEW OF SYSTEMS:   Pertinent positives as noted in the HPI. All other systems reviewed and negative. PHYSICAL EXAM PERFORMED:    /62   Pulse 72   Temp 97.8 °F (36.6 °C)   Resp 20   SpO2 100%     General appearance:  No apparent distress  HEENT:  Normal cephalic  Neck: Supple  Respiratory:  Normal respiratory effort. Clear to auscultation, bilaterally without Rales/Wheezes/Rhonchi. Cardiovascular:  Regular rate and rhythm with normal S1/S2 without murmurs, rubs or gallops. Abdomen: Soft, non-tender, non-distended  Musculoskeletal:  No clubbing, cyanosis  Skin: Skin color, texture, turgor normal.  No rashes or lesions. Neurologic:  No focal weakness   Psychiatric:  Alert and oriented  Capillary Refill: Brisk,< 3 seconds   Peripheral Pulses: +2 palpable, equal bilaterally       Labs:     Recent Labs     03/09/21  1215   WBC 4.8   HGB 9.6*   HCT 29.4*        Recent Labs     03/09/21  1215      K 4.3      CO2 25   BUN 16   CREATININE 1.3*   CALCIUM 9.7     Recent Labs     03/09/21  1215   AST 18   ALT 16   BILITOT 0.3   ALKPHOS 115     No results for input(s): INR in the last 72 hours.   Recent Labs     03/09/21  1215   TROPONINI <0.01       Urinalysis:      Lab Results   Component Value Date    NITRU Negative 10/27/2020    WBCUA 7 10/27/2020    BACTERIA RARE 08/29/2019    RBCUA 229 10/27/2020    BLOODU LARGE 10/27/2020    SPECGRAV 1.022 10/27/2020    GLUCOSEU 250 10/27/2020    GLUCOSEU NEGATIVE 05/14/2012       Radiology:       EKG:  I have reviewed the EKG with the following interpretation: No ST elevation    XR CHEST (2 VW)   Final Result   No acute findings             ASSESSMENT:    Active Hospital Problems    Diagnosis Date Noted    Chest pain [R07.9] 12/22/2020         PLAN:    1. Chest pain, possible angina, discussed with cardiology, patient being admitted, will check troponin again, heparin IV drip started, nitroglycerin as needed, telemetry monitoring, plan of care discussed with patient all questions answered  2. Diabetes mellitus, long and short acting insulin  3. CKD, monitor creatinine closely  4. Coronary artery disease, was supposed to follow-up and get a cardiac cath as outpatient, cardiology already consulted  5. Essential hypertension, p.o. medications  6. Mood disorder, on Seroquel and Cymbalta    DVT Prophylaxis: heparin  Diet: No diet orders on file  Code Status: Prior        Dispo -supervision for now       Pete Enamorado MD    Thank you Mercedes Crowe MD for the opportunity to be involved in this patient's care. If you have any questions or concerns please feel free to contact me at [phone].

## 2021-03-09 NOTE — PROGRESS NOTES
4 Eyes Skin Assessment     NAME:  Ra Hooper  YOB: 1955  MEDICAL RECORD NUMBER:  [id number]    The patient is being assess for  Admission    I agree that 2 RN's have performed a thorough Head to Toe Skin Assessment on the patient. ALL assessment sites listed below have been assessed. Areas assessed by both nurses: [facility] and [facility], Face, Ears, Shoulders, Back, Chest, Arms, Elbows, Hands, Sacrum. Buttock, Coccyx, Ischium and Legs. Feet and Heels        Does the Patient have a Wound?  No noted wound(s)       Rakan Prevention initiated:  No   Wound Care Orders initiated:  No    Pressure Injury (Stage 3,4, Unstageable, DTI, NWPT, and Complex wounds) if present place consult order under [de-identified] No    New and Established Ostomies if present place consult order under : No      Nurse 1 eSignature: Electronically signed by Ashley Rouse RN on 3/9/21 at 6:20 PM EST    **SHARE this note so that the co-signing nurse is able to place an eSignature**    Nurse 2 eSignature: Electronically signed by Laura Montoya RN on 3/9/21 at 6:43 PM EST

## 2021-03-09 NOTE — ED NOTES
Bed: A-15  Expected date: 3/9/21  Expected time: 11:19 AM  Means of arrival: [facility]  Comments:  803 Hollie Longoria RN  03/09/21 [id number]

## 2021-03-09 NOTE — CARE COORDINATION
Received call from patient's COA Parvez Jones she requested AVS from previous stay be faxed to her at [phone]. She asked that when patient is discharged again to please fax her that AVS also.

## 2021-03-09 NOTE — CONSULTS
[address]  Cardiology Consult Note        CC:     Chest pain             HPI:   This is a 60 y.o. female known coronary artery disease with multiple percutaneous procedures in the past presents with shortness of breath and chest pain. She claims she woke up with chest pain and was short of breath she took 2 nitroglycerin with partial relief. Because of ongoing pain she decided to come to the ER. When I came to see her she was laying comfortably in bed texting. She did not seem to be in any distress however. However she claimed that she was still having pain    Patient claims that she has these pains several times a week. They appear to be random without any precipitating factors.       The patient had recent coronary angiogram and intervention at [facility] in November 2020 which is just 3 months ago      Past Medical History:   Diagnosis Date    Acid reflux     Anemia     Anxiety     Arthritis     Asthma     Atrial fibrillation ([address]     Blood transfusion reaction     CAD (coronary artery disease) 12/3/2012    Cerebral artery occlusion with cerebral infarction ([address]     [facility]     Chronic kidney disease     40% kidney functiom    COPD (chronic obstructive pulmonary disease) ([facility])     Depression     DM2 (diabetes mellitus, type 2) ([address]     Dysarthria     Fibromyalgia 6/7/2016    Headache(784.0) 2/19/2013    Hemisensory loss     Hyperlipidemia     Hypertension     IBS (irritable bowel syndrome)     Inferior vena cava occlusion ([facility])     Irritable bowel syndrome     Keratitis     MI, old     Neuropathy     Superior vena cava obstruction     Temporal arteritis ([address] 2/24/2014      Past Surgical History:   Procedure Laterality Date    ABLATION OF DYSRHYTHMIC FOCUS      ARTERY BIOPSY Right 04/23/2014    RIGHT TEMPORAL ARTERY BIOPSY    CATARACT REMOVAL Bilateral     CHOLECYSTECTOMY      CORONARY ANGIOPLASTY WITH STENT PLACEMENT      CORONARY ANGIOPLASTY WITH STENT PLACEMENT      HYSTERECTOMY      JOINT REPLACEMENT Right     KNEE ARTHROSCOPY Right     KNEE SURGERY      right knee replacement    PTCA  10/2019    LAD and RCA inrtervention    TUNNELED VENOUS PORT PLACEMENT      left thigh.   SMART PORT    UPPER GASTROINTESTINAL ENDOSCOPY N/A 2020    EGD DIAGNOSTIC ONLY performed by Loan Lima MD at [facility]        Family History   Problem Relation Age of Onset    Diabetes Mother     Hypertension Mother     High Cholesterol Mother     Stroke Mother     Cancer Mother     No Known Problems Paternal Grandfather         lung issues       Social History     Tobacco Use    Smoking status: Former Smoker     Packs/day: 0.50     Years: 35.00     Pack years: 17.50     Types: Cigarettes     Quit date: 2018     Years since quittin.6    Smokeless tobacco: Former User    Tobacco comment: 5/13/15 has not smoked since hospitalization -    Substance Use Topics    Alcohol use: No     Alcohol/week: 0.0 standard drinks    Drug use: No     No Known Allergies   amLODIPine  5 mg Oral BID    [START ON 3/10/2021] aspirin  81 mg Oral Daily    atorvastatin  80 mg Oral Nightly    budesonide-formoterol  2 puff Inhalation Daily    clopidogrel  75 mg Oral Daily    dicyclomine  20 mg Oral 4x Daily AC & HS    DULoxetine  60 mg Oral Daily    hydrALAZINE  50 mg Oral BID    insulin glargine  8 Units Subcutaneous Nightly    metoprolol succinate  25 mg Oral BID WC    [START ON 3/10/2021] pantoprazole  40 mg Oral QAM AC    QUEtiapine  25 mg Oral Nightly    ranolazine  1,000 mg Oral BID    sodium chloride flush  10 mL Intravenous 2 times per day    insulin lispro  0-6 Units Subcutaneous TID WC    insulin lispro  0-3 Units Subcutaneous Nightly    divalproex  250 mg Oral TID    [START ON 3/10/2021] torsemide  10 mg Oral Daily       Review of Systems -   Constitutional: Negative for weight gain/loss; malaise, fever  Respiratory: Negative for Asthma;  cough and hemoptysis  Cardiovascular: Negative for palpitations,dizziness   Gastrointestinal: Negative for abd.pain; constipation/diarrhea;    Genitourinary: Negative for stones; hematuria; frequency hesitancy  Integumentt: Negative for rash or pruritis  Hematologic/lymphatic: Negative for blood dyscrasia; leukemia/lymphoma  Musculoskeletal: Negative for Connective tissue disease  Neurological:  Negative for Seizure   Behavioral/Psych:Negative for Bipolar disorder, Schizophrenia; Dementia  Endocrine: negative for thyroid, parathyroid disease    No intake or output data in the 24 hours ending 03/09/21 9090    Physical Examination:    BP (!) 144/66   Pulse 92   Temp 98.3 °F (36.8 °C) (Oral)   Resp 16   Wt 118 lb 13.3 oz (53.9 kg)   SpO2 99%   BMI 23.21 kg/m²    HEENT:  Face: Atraumatic, Conjunctiva: Pink; non icteric,  Mucous Memb:  Moist, No thyromegaly or Lymphadenopathy  Respiratory:  Resp Assessment: normal, Resp Auscultation: clear   Cardiovascular: Auscultation: nl S1 & S2, Palpation:  Nl PMI;  No heaves or thrills, JVP:  normal  Abdomen: Soft, non-tender, Normal bowel sounds,  No organomegaly  Extremities: No Cyanosis or Clubbing; Edema none  Neurological: Oriented to time, place, and person, Non-anxious  Psychiatric: Normal mood and affect  Skin: Warm and dry,  No rash seen      Current Facility-Administered Medications: albuterol (PROVENTIL) nebulizer solution 2.5 mg, 2.5 mg, Nebulization, Q4H PRN  albuterol sulfate  (90 Base) MCG/ACT inhaler 2 puff, 2 puff, Inhalation, Q4H PRN  amLODIPine (NORVASC) tablet 5 mg, 5 mg, Oral, BID  [START ON 3/10/2021] aspirin chewable tablet 81 mg, 81 mg, Oral, Daily  atorvastatin (LIPITOR) tablet 80 mg, 80 mg, Oral, Nightly  budesonide-formoterol (SYMBICORT) 160-4.5 MCG/ACT inhaler 2 puff, 2 puff, Inhalation, Daily  clopidogrel (PLAVIX) tablet 75 mg, 75 mg, Oral, Daily  dicyclomine (BENTYL) tablet 20 mg, 20 mg, Oral, 4x Daily AC & HS  DULoxetine (CYMBALTA) extended release capsule 60 mg, 60 mg, Oral, Daily  hydrALAZINE (APRESOLINE) tablet 50 mg, 50 mg, Oral, BID  insulin glargine (LANTUS) injection vial 8 Units, 8 Units, Subcutaneous, Nightly  metoprolol succinate (TOPROL XL) extended release tablet 25 mg, 25 mg, Oral, BID WC  nitroGLYCERIN (NITROSTAT) SL tablet 0.4 mg, 0.4 mg, Sublingual, Q5 Min PRN  [START ON 3/10/2021] pantoprazole (PROTONIX) tablet 40 mg, 40 mg, Oral, QAM AC  oxyCODONE-acetaminophen (PERCOCET) 7.5-325 MG per tablet 1 tablet, 1 tablet, Oral, Q6H PRN  QUEtiapine (SEROQUEL) tablet 25 mg, 25 mg, Oral, Nightly  ranolazine (RANEXA) extended release tablet 1,000 mg, 1,000 mg, Oral, BID  sodium chloride flush 0.9 % injection 10 mL, 10 mL, Intravenous, 2 times per day  sodium chloride flush 0.9 % injection 10 mL, 10 mL, Intravenous, PRN  promethazine (PHENERGAN) tablet 12.5 mg, 12.5 mg, Oral, Q6H PRN **OR** ondansetron (ZOFRAN) injection 4 mg, 4 mg, Intravenous, Q6H PRN  acetaminophen (TYLENOL) tablet 650 mg, 650 mg, Oral, Q6H PRN **OR** acetaminophen (TYLENOL) suppository 650 mg, 650 mg, Rectal, Q6H PRN  polyethylene glycol (GLYCOLAX) packet 17 g, 17 g, Oral, Daily PRN  glucose (GLUTOSE) 40 % oral gel 15 g, 15 g, Oral, PRN  dextrose 50 % IV solution, 12.5 g, Intravenous, PRN  glucagon (rDNA) injection 1 mg, 1 mg, Intramuscular, PRN  dextrose 5 % solution, 100 mL/hr, Intravenous, PRN  insulin lispro (HUMALOG) injection vial 0-6 Units, 0-6 Units, Subcutaneous, TID WC  insulin lispro (HUMALOG) injection vial 0-3 Units, 0-3 Units, Subcutaneous, Nightly  heparin 25,000 unit in sodium chloride 0.45% 250 mL (premix) infusion, 6.5 mL/hr, Intravenous, Continuous  divalproex (DEPAKOTE ER) extended release tablet 250 mg, 250 mg, Oral, TID  [START ON 3/10/2021] torsemide (DEMADEX) tablet 10 mg, 10 mg, Oral, Daily      Labs:   Recent Labs     03/09/21  1215 03/09/21  1530   WBC 4.8 4.9   HGB 9.6* 8.8*   HCT 29.4* 27.1*    --      Recent Labs     03/09/21  1215      K 4.3   CO2 25   BUN 16   CREATININE 1.3*   GLUCOSE 195*     No results for input(s): BNP in the last 72 hours. No results for input(s): PROTIME, INR in the last 72 hours. No results for input(s): APTT in the last 72 hours.   Recent Labs     03/09/21  1215 03/09/21  1530   TROPONINI <0.01 <0.01     Lab Results   Component Value Date    HDL 70 08/29/2019    HDL 58 05/14/2012    LDLCALC 89 08/29/2019    TRIG 131 08/29/2019     Recent Labs     03/09/21  1215   AST 18   ALT 16   LABALBU 3.9         EKG:       Chest X-Ray:      ECHO:      Stress Nuclear:      Corornary angiogram  & Intervention: 11/2020  This is a right dominant coronary arterial system    Left Main coronary artery: luminal irregularities  Left anterior descending coronary artery: patent proximal and distal   stents  Left circumflex coronary artery: patent mid stent, OM1: mid stent with mid   stent fracture and focal 70% in-stent restenosis, FFR = 0.66, medial   branch of OM1 is in-stent CHCF and has 70-80% ostial stenosis (2 mm   diameter vessel)  Right coronary artery: patent proximal and distal stents, RPDA: 90-95%   ostial stenosis  Left ventriculogram: normal wall motion, LVEF = 75%  LVEDP: 5 mmHg  Aortic pressure: 90/50 mmHg    Culprit vessel: OM1  YAKELIN flow pre-intervention: 3  YAKELIN flow post-intervention: 3  Percent stenosis pre-intervention: 70  Percent stenosis post-intervention: 10    Culprit vessel: RPDA  YAKELIN flow pre-intervention: 3  YAKELIN flow post-intervention: 3  Percent stenosis pre-intervention: 95  Percent stenosis post-intervention: 0        Impression:   3 Vessel CAD  Successful POBA OM1 with 3 mm balloon  FFR OM1 = 0.66  Successful PCI RPDA w/ 2.5 x 12 mm Keyshawn DEANGELO  Patent proximal and mid LAD stents  Patent mid LCX stent  Patent proximal and distal RCA stents      ASSESSMENT AND PLAN:        60year-old patient presenting with chest pain  The pain was still ongoing when I went to see her several hours later  She does not appear to be in any kind of distress  EKG is normal with no ischemic changes  Troponins are negative    She had a coronary angiogram just 3 months ago with multiple angioplasties  She has had innumerable percutaneous procedures in the past    At the present time she is quite comfortable  Therefore would like to confer with Dr. Swift Seen her cardiologist regarding her further care          Beba Toledo M.D  3/9/2021

## 2021-03-09 NOTE — ED PROVIDER NOTES
[address] [facility]      Pt Name: Rayray Martínez  MRN: [id number]  [place] 1955  Date of evaluation: 3/9/2021  Provider: Lizzeth Craft MD    CHIEF COMPLAINT       Chief Complaint   Patient presents with    Chest Pain     pt states that chest pain started at 0900 today, called dr Tasia Alexis office and was told to come in to ED, pt took 3 nitro and 4 asa          HISTORY OF PRESENT ILLNESS   (Location/Symptom, Timing/Onset,Context/Setting, Quality, Duration, Modifying Factors, Severity)  Note limiting factors. Rayray Martínez is a 60 y.o. female who presents to the emergency department for evaluation of chest pain. Patient reports a history of CAD, and chest pain that started at approximately 9-9:30 this morning. Patient reports that she was lying and resting when symptoms started. She states that she did have some exertional shortness of breath previously this morning. The pain she describes as 10 out of 10 pressure, located in the center of her chest and rating towards the left, into the left arm and into the back. Pain is somewhat worsened with activity as well. No alleviating factors. She did receive a full load of aspirin as well as nitroglycerin from EMS, without improvement in her symptoms patient states it is similar to the cardiac chest pain that she has had in the past.  She denies any shortness of breath at rest.  She is had no focal neurologic deficits, associate abdominal pain, nausea or vomiting. Patient follows with Dr. Sumaya Calderon, she states he did recommend that she come in today. NursingNotes were reviewed. REVIEW OF SYSTEMS    (2-9 systems for level 4, 10 or more for level 5)       Constitutional: No fever or chills. Respiratory: No cough, No shortness of breath, No sputum production. Cardiovascular: Midsternal chest pain. No palpitations. Gastrointestinal: No abdominal pain.  No nausea or vomiting  Genitourinary: No dysuria. No hematuria. Hematology/Lymphatics: No bleeding or bruising tendency. Immunologic: No malaise. No swollen glands. Musculoskeletal: No back pain. No joint pain. Integumentary: No rash. No abrasions. Neurologic: No headache. No focal numbness or weakness. PAST MEDICAL HISTORY     Past Medical History:   Diagnosis Date    Acid reflux     Anemia     Anxiety     Arthritis     Asthma     [facility] fibrillation [facility]     Blood transfusion reaction     CAD (coronary artery disease) 12/3/2012    [facility]     CHF (congestive heart failure) ([facility])     Chronic kidney disease     40% kidney functiom    COPD (chronic obstructive pulmonary disease) ([facility])     Depression     DM2 (diabetes mellitus, type 2) ([address]     Dysarthria     Fibromyalgia 6/7/2016    Headache(784.0) 2/19/2013    Hemisensory loss     Hyperlipidemia     Hypertension     IBS (irritable bowel syndrome)     Inferior vena cava occlusion ([facility])     Irritable bowel syndrome     Keratitis     MI, old     Neuropathy     Superior vena cava obstruction     Temporal arteritis ([facility] 2/24/2014         SURGICALHISTORY       Past Surgical History:   Procedure Laterality Date    ABLATION OF DYSRHYTHMIC FOCUS      ARTERY BIOPSY Right 04/23/2014    RIGHT TEMPORAL ARTERY BIOPSY    CATARACT REMOVAL Bilateral     CHOLECYSTECTOMY      CORONARY ANGIOPLASTY WITH STENT PLACEMENT      CORONARY ANGIOPLASTY WITH STENT PLACEMENT      HYSTERECTOMY      JOINT REPLACEMENT Right     KNEE ARTHROSCOPY Right     KNEE SURGERY      right knee replacement    PTCA  10/2019    LAD and RCA inrtervention    TUNNELED VENOUS PORT PLACEMENT      left thigh.   SMART PORT    UPPER GASTROINTESTINAL ENDOSCOPY N/A 7/6/2020    EGD DIAGNOSTIC ONLY performed by Jerrod Conklin MD at [facility]       Previous Medications    AIMOVIG 70 MG/ML SOAJ    INJECT 140 MLS INTO THE SKIN EVERY 30 DAYS    ALBUTEROL (PROVENTIL) (2.5 MG/3ML) 0.083% NEBULIZER SOLUTION    Take 3 mLs by nebulization every 4 hours as needed for Wheezing    ALBUTEROL SULFATE HFA (VENTOLIN HFA) 108 (90 BASE) MCG/ACT INHALER    Inhale 2 puffs into the lungs every 4 hours as needed for Wheezing or Shortness of Breath    AMLODIPINE (NORVASC) 5 MG TABLET    Take 1 tablet by mouth 2 times daily    ASPIRIN 81 MG CHEWABLE TABLET    Take 1 tablet by mouth daily    ATORVASTATIN (LIPITOR) 80 MG TABLET    Take 1 tablet by mouth nightly    BLOOD GLUCOSE TEST STRIPS (TRUE METRIX BLOOD GLUCOSE TEST) STRIP    1 each by Does not apply route 2 times daily    BUDESONIDE-FORMOTEROL (SYMBICORT) 160-4.5 MCG/ACT AERO    Inhale 2 puffs into the lungs daily    CLOPIDOGREL (PLAVIX) 75 MG TABLET    TAKE 1 TABLET BY MOUTH DA JULIEN    DICYCLOMINE (BENTYL) 20 MG TABLET    Take 20 mg by mouth 4 times daily (before meals and nightly)     DULOXETINE (CYMBALTA) 60 MG EXTENDED RELEASE CAPSULE    TAKE 1 CAPSULE BY MOUTH DAILY    HYDRALAZINE (APRESOLINE) 50 MG TABLET    Take 1 tablet by mouth 2 times daily    INSULIN GLARGINE (BASAGLAR KWIKPEN) 100 UNIT/ML INJECTION PEN    Inject 8 Units into the skin nightly    LIDOCAINE (XYLOCAINE) 5 % OINTMENT    Apply topically as needed. METOPROLOL SUCCINATE (TOPROL XL) 50 MG EXTENDED RELEASE TABLET    TAKE 0.5 TABLETS BY MOUTH 2 TIMES DAILY (WITH MEALS)    NITROGLYCERIN (NITROSTAT) 0.4 MG SL TABLET    Place 1 tablet under the tongue every 5 minutes as needed for Chest pain up to max of 3 total doses. If no relief after 1 dose, call 911.     NUTRITIONAL SUPPLEMENTS (ENSURE) LIQD    Take 1 Can by mouth 3 times daily as needed (nutrition)    OMEPRAZOLE (PRILOSEC) 20 MG DELAYED RELEASE CAPSULE    TAKE 1 CAPSULE BY MOUTH DAILY    ONDANSETRON (ZOFRAN) 4 MG TABLET    Take 1 tablet by mouth 3 times daily as needed for Nausea or Vomiting    OXYCODONE-ACETAMINOPHEN (PERCOCET) 7.5-325 MG PER TABLET    Take 1 tablet by mouth every 6 hours as needed for Pain (max 3-4 day) for up to 28 days. QUETIAPINE (SEROQUEL) 25 MG TABLET    TAKE 1-2 TABLETS BY MOUTH NIGHTLY    RANOLAZINE (RANEXA) 1000 MG EXTENDED RELEASE TABLET    Take 1 tablet by mouth 2 times daily    RIZATRIPTAN (MAXALT) 10 MG TABLET    Take 1 tablet by mouth once as needed for Migraine May repeat in 2 hours if needed    SULFAMETHOXAZOLE-TRIMETHOPRIM (BACTRIM DS;SEPTRA DS) 800-160 MG PER TABLET    Take 1 tablet by mouth 2 times daily for 5 days       ALLERGIES     Patient has no known allergies. FAMILY HISTORY       Family History   Problem Relation Age of Onset    Diabetes Mother     Hypertension Mother     High Cholesterol Mother     Stroke Mother     Cancer Mother     No Known Problems Paternal Grandfather         lung issues           SOCIAL HISTORY       Social History     Socioeconomic History    Marital status:       Spouse name: Not on file    Number of children: 6    Years of education: Not on file    Highest education level: Not on file   Occupational History    Not on file   Social Needs    Financial resource strain: Not on file    Food insecurity     Worry: Not on file     Inability: Not on file    Transportation needs     Medical: Not on file     Non-medical: Not on file   Tobacco Use    Smoking status: Former Smoker     Packs/day: 0.50     Years: 35.00     Pack years: 17.50     Types: Cigarettes     Quit date: 2018     Years since quittin.6    Smokeless tobacco: Former User    Tobacco comment: 5/13/15 has not smoked since hospitalization -    Substance and Sexual Activity    Alcohol use: No     Alcohol/week: 0.0 standard drinks    Drug use: No    Sexual activity: Yes     Partners: Male   Lifestyle    Physical activity     Days per week: Not on file     Minutes per session: Not on file    Stress: Not on file   Relationships    Social connections     Talks on phone: Not on file     Gets together: Not on file Attends [employer] service: Not on file     Active member of club or organization: Not on file     Attends meetings of clubs or organizations: Not on file     Relationship status: Not on file    Intimate partner violence     Fear of current or ex partner: Not on file     Emotionally abused: Not on file     Physically abused: Not on file     Forced sexual activity: Not on file   Other Topics Concern    Not on file   Social History Narrative    Not on file       SCREENINGS             PHYSICAL EXAM    (up to 7 for level 4, 8 or more for level 5)     ED Triage Vitals [03/09/21 1145]   BP Temp Temp src Pulse Resp SpO2 Height Weight   129/62 97.8 °F (36.6 °C) -- 72 20 100 % -- --       General: Alert and oriented, No acute distress. Eye: Normal conjunctiva. Pupils equal and reactive. HENT: Oral mucosa is moist.  Respiratory: Lungs are clear to auscultation, Respirations are non-labored. Cardiovascular: Normal rate, Regular rhythm. Normal radial pulses. Gastrointestinal: Soft, Non-tender, Non-distended. Musculoskeletal: No swelling. Integumentary: Warm, Dry. Neurologic: Alert, Oriented, No focal defects. Normal strength and sensation in all extremities. Psychiatric: Cooperative.     DIAGNOSTIC RESULTS     EKG: All EKG's are interpreted by the Emergency Department Physician who either signs or Co-signsthis chart in the absence of a cardiologist.    Per my interpretation:    Electrocardiogram (ECG) 3/9/2021 11:37 AM  RATE: normal  RHYTHM: normal sinus  AXIS: normal  INTERVALS: normal  ST-T WAVE CHANGES: No evidence of ST segment elevation or T-wave inversion, nonspecific diffuse T wave flattening  Prior for comparison 1/31/2021    RADIOLOGY:   Non-plain filmimages such as CT, Ultrasound and MRI are read by the radiologist. Plain radiographic images are visualized and preliminarily interpreted by the emergency physician with the below findings:      Interpretation per the Radiologist below, if available at the time ofthis note:    XR CHEST (2 VW)   Final Result   No acute findings               ED BEDSIDE ULTRASOUND:   Performed by ED Physician - none    LABS:  Labs Reviewed   CBC WITH AUTO DIFFERENTIAL - Abnormal; Notable for the following components:       Result Value    RBC 2.99 (*)     Hemoglobin 9.6 (*)     Hematocrit 29.4 (*)     All other components within normal limits    Narrative:     Performed at:  [facility]  [address] [place], [place] [address]   Phone [phone]   COMPREHENSIVE METABOLIC PANEL W/ REFLEX TO MG FOR LOW K - Abnormal; Notable for the following components:    Glucose 195 (*)     CREATININE 1.3 (*)     GFR Non- 41 (*)     GFR African American 50 (*)     Albumin/Globulin Ratio 1.0 (*)     All other components within normal limits    Narrative:     Performed at:  [facility]  [address], [place] [address]   Phone [phone]   TROPONIN    Narrative:     Performed at:  [facility]  [address] [place], [place] [address]   Phone [phone]       All other labs were within normal range or not returned as of this dictation. EMERGENCY DEPARTMENT COURSE and DIFFERENTIAL DIAGNOSIS/MDM:   Vitals:    Vitals:    03/09/21 1145   BP: 129/62   Pulse: 72   Resp: 20   Temp: 97.8 °F (36.6 °C)   SpO2: 100%         Medical decision making: This is a 61-year-old female who follows with Dr. Kolton aSrmiento for known CAD, was supposed to see him today actually to schedule an outpatient left heart catheterization, but came to the emerge department instead with increasing chest pressure, radiating to the left shoulder and arm. Received an aspirin load as well as nitroglycerin prehospital without significant provement. Pain is improved with morphine here. On exam she is well-appearing, afebrile, with normal vital signs. Physical exam is unremarkable.   Radial pulses are equal.

## 2021-03-09 NOTE — PROGRESS NOTES
Patient is still complaining of chest pain. Nitro 0.4mg tablet (2 doses) given for chest pain. Patient reports having some relief from Nitro tabs. Will continue to monitor per unit protocols.  Electronically signed by Karol Hodge RN on 3/9/2021 at 4:49 PM

## 2021-03-09 NOTE — TELEPHONE ENCOUNTER
Patient states she has a UTI. She states that it burns when she urinates. She states this has been going on for a couple of days.   Please advise

## 2021-03-09 NOTE — PROGRESS NOTES
Patient is resting in bed, awake talking on the phone. Room air. Side rails are up x2. Fall precautions are in place. Bed is in lowest position. Call light, telephone and bedside table are within reach. Patient calls appropriately. Patient denies chest pain at present. Will continue to monitor patient per unit protocols.  Electronically signed by Christina Preciado RN on 3/9/2021 at 6:25 PM

## 2021-03-09 NOTE — TELEPHONE ENCOUNTER
----- Message from Izzy Polanco MD sent at 3/3/2017  8:35 AM CST -----  Please let family know that patient has no signs of anemia, they may call if questions.    -MM   Patient calling stating that she is having shortness of breath and chest pain. She sounded very short of breath on the phone. She says that these are the same symptoms she had in the past. I advised her she should go to the emergency room. She says that her daughter is there and she will go now.

## 2021-03-10 ENCOUNTER — TELEPHONE (OUTPATIENT)
Dept: CARDIOLOGY CLINIC | Age: 66
End: 2021-03-10

## 2021-03-10 LAB
APTT: 49.2 SEC (ref 24.2–36.2)
EKG ATRIAL RATE: 60 BPM
EKG DIAGNOSIS: NORMAL
EKG P AXIS: -10 DEGREES
EKG P-R INTERVAL: 140 MS
EKG Q-T INTERVAL: 452 MS
EKG QRS DURATION: 88 MS
EKG QTC CALCULATION (BAZETT): 452 MS
EKG R AXIS: 39 DEGREES
EKG T AXIS: 95 DEGREES
EKG VENTRICULAR RATE: 60 BPM
ESTIMATED AVERAGE GLUCOSE: 191.5 MG/DL
GLUCOSE BLD-MCNC: 143 MG/DL (ref 70–99)
GLUCOSE BLD-MCNC: 153 MG/DL (ref 70–99)
GLUCOSE BLD-MCNC: 174 MG/DL (ref 70–99)
GLUCOSE BLD-MCNC: 184 MG/DL (ref 70–99)
HBA1C MFR BLD: 8.3 %
HCT VFR BLD CALC: 28.7 % (ref 36–48)
HEMOGLOBIN: 9.3 G/DL (ref 12–16)
MCH RBC QN AUTO: 32.2 PG (ref 26–34)
MCHC RBC AUTO-ENTMCNC: 32.4 G/DL (ref 31–36)
MCV RBC AUTO: 99.3 FL (ref 80–100)
PDW BLD-RTO: 15 % (ref 12.4–15.4)
PERFORMED ON: ABNORMAL
PLATELET # BLD: 184 K/UL (ref 135–450)
PMV BLD AUTO: 8.6 FL (ref 5–10.5)
RBC # BLD: 2.89 M/UL (ref 4–5.2)
WBC # BLD: 4.5 K/UL (ref 4–11)

## 2021-03-10 PROCEDURE — 93010 ELECTROCARDIOGRAM REPORT: CPT | Performed by: INTERNAL MEDICINE

## 2021-03-10 PROCEDURE — 99215 OFFICE O/P EST HI 40 MIN: CPT | Performed by: INTERNAL MEDICINE

## 2021-03-10 PROCEDURE — 94640 AIRWAY INHALATION TREATMENT: CPT

## 2021-03-10 PROCEDURE — 94760 N-INVAS EAR/PLS OXIMETRY 1: CPT

## 2021-03-10 PROCEDURE — 93005 ELECTROCARDIOGRAM TRACING: CPT | Performed by: INTERNAL MEDICINE

## 2021-03-10 PROCEDURE — 6370000000 HC RX 637 (ALT 250 FOR IP): Performed by: INTERNAL MEDICINE

## 2021-03-10 PROCEDURE — 2580000003 HC RX 258: Performed by: INTERNAL MEDICINE

## 2021-03-10 PROCEDURE — 36415 COLL VENOUS BLD VENIPUNCTURE: CPT

## 2021-03-10 PROCEDURE — 85730 THROMBOPLASTIN TIME PARTIAL: CPT

## 2021-03-10 PROCEDURE — G0378 HOSPITAL OBSERVATION PER HR: HCPCS

## 2021-03-10 PROCEDURE — 85027 COMPLETE CBC AUTOMATED: CPT

## 2021-03-10 RX ADMIN — BUDESONIDE AND FORMOTEROL FUMARATE DIHYDRATE 2 PUFF: 160; 4.5 AEROSOL RESPIRATORY (INHALATION) at 08:03

## 2021-03-10 RX ADMIN — DICYCLOMINE HYDROCHLORIDE 20 MG: 20 TABLET ORAL at 20:14

## 2021-03-10 RX ADMIN — DULOXETINE HYDROCHLORIDE 60 MG: 60 CAPSULE, DELAYED RELEASE ORAL at 09:42

## 2021-03-10 RX ADMIN — HYDRALAZINE HYDROCHLORIDE 50 MG: 50 TABLET, FILM COATED ORAL at 20:14

## 2021-03-10 RX ADMIN — TORSEMIDE 10 MG: 20 TABLET ORAL at 09:42

## 2021-03-10 RX ADMIN — DICYCLOMINE HYDROCHLORIDE 20 MG: 20 TABLET ORAL at 12:35

## 2021-03-10 RX ADMIN — AMLODIPINE BESYLATE 5 MG: 5 TABLET ORAL at 09:41

## 2021-03-10 RX ADMIN — QUETIAPINE FUMARATE 25 MG: 25 TABLET ORAL at 20:14

## 2021-03-10 RX ADMIN — OXYCODONE HYDROCHLORIDE AND ACETAMINOPHEN 1 TABLET: 7.5; 325 TABLET ORAL at 20:22

## 2021-03-10 RX ADMIN — OXYCODONE HYDROCHLORIDE AND ACETAMINOPHEN 1 TABLET: 7.5; 325 TABLET ORAL at 09:45

## 2021-03-10 RX ADMIN — RANOLAZINE 1000 MG: 500 TABLET, FILM COATED, EXTENDED RELEASE ORAL at 20:14

## 2021-03-10 RX ADMIN — RANOLAZINE 1000 MG: 500 TABLET, FILM COATED, EXTENDED RELEASE ORAL at 09:41

## 2021-03-10 RX ADMIN — DICYCLOMINE HYDROCHLORIDE 20 MG: 20 TABLET ORAL at 16:48

## 2021-03-10 RX ADMIN — INSULIN GLARGINE 8 UNITS: 100 INJECTION, SOLUTION SUBCUTANEOUS at 22:09

## 2021-03-10 RX ADMIN — OXYCODONE HYDROCHLORIDE AND ACETAMINOPHEN 1 TABLET: 7.5; 325 TABLET ORAL at 03:14

## 2021-03-10 RX ADMIN — ACETAMINOPHEN 650 MG: 325 TABLET ORAL at 13:35

## 2021-03-10 RX ADMIN — CLOPIDOGREL BISULFATE 75 MG: 75 TABLET ORAL at 09:41

## 2021-03-10 RX ADMIN — ATORVASTATIN CALCIUM 80 MG: 80 TABLET, FILM COATED ORAL at 20:14

## 2021-03-10 RX ADMIN — DIVALPROEX SODIUM 250 MG: 250 TABLET, EXTENDED RELEASE ORAL at 20:14

## 2021-03-10 RX ADMIN — INSULIN LISPRO 1 UNITS: 100 INJECTION, SOLUTION INTRAVENOUS; SUBCUTANEOUS at 12:35

## 2021-03-10 RX ADMIN — INSULIN LISPRO 1 UNITS: 100 INJECTION, SOLUTION INTRAVENOUS; SUBCUTANEOUS at 16:49

## 2021-03-10 RX ADMIN — HYDRALAZINE HYDROCHLORIDE 50 MG: 50 TABLET, FILM COATED ORAL at 09:42

## 2021-03-10 RX ADMIN — Medication 10 ML: at 09:45

## 2021-03-10 RX ADMIN — INSULIN LISPRO 1 UNITS: 100 INJECTION, SOLUTION INTRAVENOUS; SUBCUTANEOUS at 09:43

## 2021-03-10 RX ADMIN — DIVALPROEX SODIUM 250 MG: 250 TABLET, EXTENDED RELEASE ORAL at 13:35

## 2021-03-10 RX ADMIN — METOPROLOL SUCCINATE 25 MG: 25 TABLET, EXTENDED RELEASE ORAL at 16:47

## 2021-03-10 RX ADMIN — INSULIN LISPRO 1 UNITS: 100 INJECTION, SOLUTION INTRAVENOUS; SUBCUTANEOUS at 22:09

## 2021-03-10 RX ADMIN — AMLODIPINE BESYLATE 5 MG: 5 TABLET ORAL at 20:14

## 2021-03-10 RX ADMIN — DIVALPROEX SODIUM 250 MG: 250 TABLET, EXTENDED RELEASE ORAL at 09:41

## 2021-03-10 RX ADMIN — ASPIRIN 81 MG: 81 TABLET, CHEWABLE ORAL at 09:42

## 2021-03-10 ASSESSMENT — PAIN DESCRIPTION - ONSET
ONSET: ON-GOING

## 2021-03-10 ASSESSMENT — PAIN SCALES - WONG BAKER: WONGBAKER_NUMERICALRESPONSE: 0

## 2021-03-10 ASSESSMENT — PAIN DESCRIPTION - DESCRIPTORS
DESCRIPTORS: ACHING;CONSTANT
DESCRIPTORS: ACHING
DESCRIPTORS: HEAVINESS

## 2021-03-10 ASSESSMENT — PAIN DESCRIPTION - PROGRESSION
CLINICAL_PROGRESSION: NOT CHANGED

## 2021-03-10 ASSESSMENT — PAIN DESCRIPTION - PAIN TYPE
TYPE: ACUTE PAIN

## 2021-03-10 ASSESSMENT — PAIN SCALES - GENERAL
PAINLEVEL_OUTOF10: 10
PAINLEVEL_OUTOF10: 7
PAINLEVEL_OUTOF10: 8

## 2021-03-10 ASSESSMENT — PAIN DESCRIPTION - LOCATION
LOCATION: CHEST
LOCATION: HEAD
LOCATION: CHEST
LOCATION: HEAD

## 2021-03-10 ASSESSMENT — PAIN DESCRIPTION - FREQUENCY
FREQUENCY: CONTINUOUS

## 2021-03-10 ASSESSMENT — PAIN DESCRIPTION - ORIENTATION: ORIENTATION: MID

## 2021-03-10 NOTE — PLAN OF CARE
Problem: Pain:  Goal: Pain level will decrease  Description: Pain level will decrease  Outcome: Ongoing  Note: Pt assessed for pain. Pt denies any pain at this time. Will continue to monitor pt and assess for pain throughout rest of shift. Goal: Control of acute pain  Description: Control of acute pain  Outcome: Ongoing  Note: Pt assessed for pain. Pt denies any pain at this time. Will continue to monitor pt and assess for pain throughout rest of shift. Goal: Control of chronic pain  Description: Control of chronic pain  Outcome: Ongoing  Note: Pt assessed for pain. Pt denies any pain at this time. Will continue to monitor pt and assess for pain throughout rest of shift. Problem: Falls - Risk of:  Goal: Will remain free from falls  Description: Will remain free from falls  Outcome: Ongoing  Note: Fall risk assessment completed. Fall precautions in place. Call light within reach. Pt educated on calling for assistance before getting up. Walkway free of clutter. Will continue to monitor. Goal: Absence of physical injury  Description: Absence of physical injury  Outcome: Ongoing  Note: Pt is free of injury. No injury noted. Fall precautions in place. Call light within reach. Will monitor. Problem: OXYGENATION/RESPIRATORY FUNCTION  Goal: Patient will maintain patent airway  Outcome: Ongoing  Note: Pt maintaining a patent airway. No distress noted. Will monitor. Goal: Patient will achieve/maintain normal respiratory rate/effort  Description: Respiratory rate and effort will be within normal limits for the patient  Outcome: Ongoing  Note: Pt maintaining a normal respiratory rate/effort. Will monitor. Problem: HEMODYNAMIC STATUS  Goal: Patient has stable vital signs and fluid balance  Outcome: Ongoing  Note: Pt maintaining normal vital signs. Will monitor.       Problem: FLUID AND ELECTROLYTE IMBALANCE  Goal: Fluid and electrolyte balance are achieved/maintained  Outcome: Ongoing  Note: Pt maintaining electrolyte balance with replacement. Will monitor. Problem: ACTIVITY INTOLERANCE/IMPAIRED MOBILITY  Goal: Mobility/activity is maintained at optimum level for patient  Outcome: Ongoing  Note: Pt maintaining mobility. Ambulating with walker and SBA x1.

## 2021-03-10 NOTE — PROGRESS NOTES
Pt A&O in the bed. Pt remains NPO at this time for possible stress test this afternoon. AM medications given without complications. Metoprolol held for HR of 54. MD notified. Call light within reach. Able to make needs known. Fall precautions in place. Will monitor.  Electronically signed by Azeb Almazan RN on 3/10/2021 at 1:32 PM

## 2021-03-10 NOTE — PROGRESS NOTES
CHOLECYSTECTOMY      CORONARY ANGIOPLASTY WITH STENT PLACEMENT      CORONARY ANGIOPLASTY WITH STENT PLACEMENT      HYSTERECTOMY      JOINT REPLACEMENT Right     KNEE ARTHROSCOPY Right     KNEE SURGERY      right knee replacement    PTCA  10/2019    LAD and RCA inrtervention    TUNNELED VENOUS PORT PLACEMENT      left thigh.   SMART PORT    UPPER GASTROINTESTINAL ENDOSCOPY N/A 2020    EGD DIAGNOSTIC ONLY performed by Josey Richardson MD at [address]        Family History   Problem Relation Age of Onset    Diabetes Mother     Hypertension Mother     High Cholesterol Mother     Stroke Mother     Cancer Mother     No Known Problems Paternal Grandfather         lung issues       Social History     Tobacco Use    Smoking status: Former Smoker     Packs/day: 0.50     Years: 35.00     Pack years: 17.50     Types: Cigarettes     Quit date: 2018     Years since quittin.6    Smokeless tobacco: Former User    Tobacco comment: 5/13/15 has not smoked since hospitalization -    Substance Use Topics    Alcohol use: No     Alcohol/week: 0.0 standard drinks    Drug use: No     No Known Allergies   amLODIPine  5 mg Oral BID    aspirin  81 mg Oral Daily    atorvastatin  80 mg Oral Nightly    budesonide-formoterol  2 puff Inhalation Daily    clopidogrel  75 mg Oral Daily    dicyclomine  20 mg Oral 4x Daily AC & HS    DULoxetine  60 mg Oral Daily    hydrALAZINE  50 mg Oral BID    insulin glargine  8 Units Subcutaneous Nightly    metoprolol succinate  25 mg Oral BID WC    pantoprazole  40 mg Oral QAM AC    QUEtiapine  25 mg Oral Nightly    ranolazine  1,000 mg Oral BID    sodium chloride flush  10 mL Intravenous 2 times per day    insulin lispro  0-6 Units Subcutaneous TID WC    insulin lispro  0-3 Units Subcutaneous Nightly    divalproex  250 mg Oral TID    torsemide  10 mg Oral Daily       Review of Systems -   Constitutional: Negative for weight gain/loss; malaise, fever  Respiratory: Negative for Asthma;  cough and hemoptysis  Cardiovascular: Negative for palpitations,dizziness   Gastrointestinal: Negative for abd.pain; constipation/diarrhea;    Genitourinary: Negative for stones; hematuria; frequency hesitancy  Integumentt: Negative for rash or pruritis  Hematologic/lymphatic: Negative for blood dyscrasia; leukemia/lymphoma  Musculoskeletal: Negative for Connective tissue disease  Neurological:  Negative for Seizure   Behavioral/Psych:Negative for Bipolar disorder, Schizophrenia; Dementia  Endocrine: negative for thyroid, parathyroid disease      Intake/Output Summary (Last 24 hours) at 3/10/2021 1704  Last data filed at 3/10/2021 1556  Gross per 24 hour   Intake 960 ml   Output --   Net 960 ml       Physical Examination:    /72   Pulse 63   Temp 98.1 °F (36.7 °C) (Oral)   Resp 16   Wt 117 lb 4.6 oz (53.2 kg)   SpO2 98%   BMI 22.91 kg/m²    HEENT:  Face: Atraumatic, Conjunctiva: Pink; non icteric,  Mucous Memb:  Moist, No thyromegaly or Lymphadenopathy  Respiratory:  Resp Assessment: normal, Resp Auscultation: clear   Cardiovascular: Auscultation: nl S1 & S2, Palpation:  Nl PMI;  No heaves or thrills, JVP:  normal  Abdomen: Soft, non-tender, Normal bowel sounds,  No organomegaly  Extremities: No Cyanosis or Clubbing; Edema none  Neurological: Oriented to time, place, and person, Non-anxious  Psychiatric: Normal mood and affect  Skin: Warm and dry,  No rash seen      Current Facility-Administered Medications: albuterol (PROVENTIL) nebulizer solution 2.5 mg, 2.5 mg, Nebulization, Q4H PRN  albuterol sulfate  (90 Base) MCG/ACT inhaler 2 puff, 2 puff, Inhalation, Q4H PRN  amLODIPine (NORVASC) tablet 5 mg, 5 mg, Oral, BID  aspirin chewable tablet 81 mg, 81 mg, Oral, Daily  atorvastatin (LIPITOR) tablet 80 mg, 80 mg, Oral, Nightly  budesonide-formoterol (SYMBICORT) 160-4.5 MCG/ACT inhaler 2 puff, 2 puff, Inhalation, Daily  clopidogrel (PLAVIX) tablet 75 mg, 75 mg, Oral, Daily  dicyclomine (BENTYL) tablet 20 mg, 20 mg, Oral, 4x Daily AC & HS  DULoxetine (CYMBALTA) extended release capsule 60 mg, 60 mg, Oral, Daily  hydrALAZINE (APRESOLINE) tablet 50 mg, 50 mg, Oral, BID  insulin glargine (LANTUS) injection vial 8 Units, 8 Units, Subcutaneous, Nightly  metoprolol succinate (TOPROL XL) extended release tablet 25 mg, 25 mg, Oral, BID WC  nitroGLYCERIN (NITROSTAT) SL tablet 0.4 mg, 0.4 mg, Sublingual, Q5 Min PRN  pantoprazole (PROTONIX) tablet 40 mg, 40 mg, Oral, QAM AC  oxyCODONE-acetaminophen (PERCOCET) 7.5-325 MG per tablet 1 tablet, 1 tablet, Oral, Q6H PRN  QUEtiapine (SEROQUEL) tablet 25 mg, 25 mg, Oral, Nightly  ranolazine (RANEXA) extended release tablet 1,000 mg, 1,000 mg, Oral, BID  sodium chloride flush 0.9 % injection 10 mL, 10 mL, Intravenous, 2 times per day  sodium chloride flush 0.9 % injection 10 mL, 10 mL, Intravenous, PRN  promethazine (PHENERGAN) tablet 12.5 mg, 12.5 mg, Oral, Q6H PRN **OR** ondansetron (ZOFRAN) injection 4 mg, 4 mg, Intravenous, Q6H PRN  acetaminophen (TYLENOL) tablet 650 mg, 650 mg, Oral, Q6H PRN **OR** acetaminophen (TYLENOL) suppository 650 mg, 650 mg, Rectal, Q6H PRN  polyethylene glycol (GLYCOLAX) packet 17 g, 17 g, Oral, Daily PRN  glucose (GLUTOSE) 40 % oral gel 15 g, 15 g, Oral, PRN  dextrose 50 % IV solution, 12.5 g, Intravenous, PRN  glucagon (rDNA) injection 1 mg, 1 mg, Intramuscular, PRN  dextrose 5 % solution, 100 mL/hr, Intravenous, PRN  insulin lispro (HUMALOG) injection vial 0-6 Units, 0-6 Units, Subcutaneous, TID WC  insulin lispro (HUMALOG) injection vial 0-3 Units, 0-3 Units, Subcutaneous, Nightly  divalproex (DEPAKOTE ER) extended release tablet 250 mg, 250 mg, Oral, TID  torsemide (DEMADEX) tablet 10 mg, 10 mg, Oral, Daily      Labs:   Recent Labs     03/09/21  1530 03/10/21  0738   WBC 4.9 4.5   HGB 8.8* 9.3*   HCT 27.1* 28.7*    184     Recent Labs     03/09/21  1215      K 4.3   CO2 25   BUN 16 CREATININE 1.3*   GLUCOSE 195*     No results for input(s): BNP in the last 72 hours. No results for input(s): PROTIME, INR in the last 72 hours.   Recent Labs     03/09/21  2307 03/10/21  0738   APTT 149.6* 49.2*     Recent Labs     03/09/21  1215 03/09/21  1530 03/09/21  2307   TROPONINI <0.01 <0.01 <0.01     Lab Results   Component Value Date    HDL 70 08/29/2019    HDL 58 05/14/2012    LDLCALC 89 08/29/2019    TRIG 131 08/29/2019     Recent Labs     03/09/21  1215   AST 18   ALT 16   LABALBU 3.9         EKG:       Chest X-Ray:      ECHO:      Stress Nuclear:      Corornary angiogram  & Intervention: 11/2020  This is a right dominant coronary arterial system    Left Main coronary artery: luminal irregularities  Left anterior descending coronary artery: patent proximal and distal   stents  Left circumflex coronary artery: patent mid stent, OM1: mid stent with mid   stent fracture and focal 70% in-stent restenosis, FFR = 0.66, medial   branch of OM1 is in-stent correction and has 70-80% ostial stenosis (2 mm   diameter vessel)  Right coronary artery: patent proximal and distal stents, RPDA: 90-95%   ostial stenosis  Left ventriculogram: normal wall motion, LVEF = 75%  LVEDP: 5 mmHg  Aortic pressure: 90/50 mmHg    Culprit vessel: OM1  YAKELIN flow pre-intervention: 3  YAKELIN flow post-intervention: 3  Percent stenosis pre-intervention: 70  Percent stenosis post-intervention: 10    Culprit vessel: RPDA  YAKELIN flow pre-intervention: 3  YAKELIN flow post-intervention: 3  Percent stenosis pre-intervention: 95  Percent stenosis post-intervention: 0        Impression:   3 Vessel CAD  Successful POBA OM1 with 3 mm balloon  FFR OM1 = 0.66  Successful PCI RPDA w/ 2.5 x 12 mm Keyshawn DEANGELO  Patent proximal and mid LAD stents  Patent mid LCX stent  Patent proximal and distal RCA stents      ASSESSMENT AND PLAN:        60year-old patient presenting with chest pain  The pain was still ongoing when I went to see her several hours later  She does not appear to be in any kind of distress  EKG is normal with no ischemic changes  Troponins are negative    She had a coronary angiogram just 3 months ago with multiple angioplasties  She has had innumerable percutaneous procedures in the past    A has ruled out for non-STEMI with negative troponins  No further chest pains    Patient has had so many admissions in some any procedures that it is hard to be certain if this is angina  Nevertheless, Dr. Cyrus Dupont was not able to see her so I will order a chemical stress test tomorrow.         Mike Mckenna M.D  3/10/2021

## 2021-03-10 NOTE — CARE COORDINATION
INITIAL CASE MANAGEMENT ASSESSMENT    Reviewed chart, met with patient to assess possible discharge needs. Explained Case Management role/services. Living Situation: lives at home alone in her senior high rise apt - there is an elevator    ADLs: receives some assist - -otherwise independent     DME: cane and [employer]    PT/OT Recs: not ordered     Active Services: active with COA- receives an aide 5 days/week for 5 hours/day  Aide assists with cooking/cleaning/laundry and prn bathing    is Crystal Pisano #[phone]    Transportation: does not drive-- daughter assists with transport     Medications: no issues    PCP: Dr Shoaib Restrepo      HD/PD: n/a    PLAN/COMMENTS: spoke w/ patient who plans to return home at discharge -will resume COA services --pt interested in visiting nurse at discharge and [facility]--referral to Brandyn Ray with [facility]--will follow. SW/CM provided contact information for patient or family to call with any questions. SW/CM will follow and assist as needed.   Electronically signed by Sumit Rudolph on 3/10/2021 at 10:43 AM  #[phone]

## 2021-03-10 NOTE — TELEPHONE ENCOUNTER
Cardiac Clearance Request    Procedure: colonoscopy  Cardiologist: Dr. Mayi Park  Last Appointment:01/28/21  Next Appointment:x  How long do the blood thinners need to be held?: Eliquis 48 hr and brilinta 5 days prior to procedure.   Cardiac History: HTN, CAD, A-fib, and CHF      Dr. Amandeep Maldonado   Fax number: [phone]

## 2021-03-10 NOTE — PROGRESS NOTES
Clinical Pharmacy Note  Heparin Dosing       Lab Results   Component Value Date    APTT 49.2 03/10/2021     Lab Results   Component Value Date    HGB 9.3 03/10/2021    HCT 28.7 03/10/2021     03/10/2021    INR 1.02 03/01/2021       Current Infusion Rate: 6.5 mL/hr    Plan:  HOLD for 1 hour  Rate: Restart at 3.3 mL/hr  Next aPTT: 0700  3/10/21    Pharmacy will continue to monitor and adjust based on aPTT results.     Marcio Orozco Rph  3/10/2021 8:45 AM

## 2021-03-10 NOTE — PROGRESS NOTES
Clinical Pharmacy Note  Heparin Dosing       Lab Results   Component Value Date    APTT 149.6 03/09/2021     Lab Results   Component Value Date    HGB 8.8 03/09/2021    HCT 27.1 03/09/2021     03/09/2021    INR 1.02 03/01/2021       Current Infusion Rate: 6.5 mL/hr    Plan:  HOLD for 1 hour  Rate: Restart at 3.3 mL/hr  Next aPTT: 0700  3/10/21    Pharmacy will continue to monitor and adjust based on aPTT results.     Portillo Mueller, PharmD  3/10/2021 12:16 AM

## 2021-03-10 NOTE — PROGRESS NOTES
This RN spoke with Dr. Glorious Olszewski regarding the Heparin gtt and the PTT. We lost IV access and were unsuccessful with further attempts. OK to leave IV out and discontinue the Heparin gtt. Pharmacy notified.  Electronically signed by Kindra Edwards RN on 3/10/2021 at 4:46 PM

## 2021-03-10 NOTE — PROGRESS NOTES
Pt continues with headache. MD notified. Awaiting return call.  Electronically signed by Ginny Ansari RN on 3/10/2021 at 1:26 PM

## 2021-03-10 NOTE — PROGRESS NOTES
Hospitalist Progress Note      PCP: Brendan James MD    Date of Admission: 3/9/2021        Subjective: still complaining of some chest discomfort, but better, also complaining of mild headache, no SOB, fever, chills or cough. Medications:  Reviewed    Infusion Medications    dextrose      heparin (PORCINE) Infusion 3.3 mL/hr (03/10/21 0102)     Scheduled Medications    amLODIPine  5 mg Oral BID    aspirin  81 mg Oral Daily    atorvastatin  80 mg Oral Nightly    budesonide-formoterol  2 puff Inhalation Daily    clopidogrel  75 mg Oral Daily    dicyclomine  20 mg Oral 4x Daily AC & HS    DULoxetine  60 mg Oral Daily    hydrALAZINE  50 mg Oral BID    insulin glargine  8 Units Subcutaneous Nightly    metoprolol succinate  25 mg Oral BID WC    pantoprazole  40 mg Oral QAM AC    QUEtiapine  25 mg Oral Nightly    ranolazine  1,000 mg Oral BID    sodium chloride flush  10 mL Intravenous 2 times per day    insulin lispro  0-6 Units Subcutaneous TID WC    insulin lispro  0-3 Units Subcutaneous Nightly    divalproex  250 mg Oral TID    torsemide  10 mg Oral Daily     PRN Meds: albuterol, albuterol sulfate HFA, nitroGLYCERIN, oxyCODONE-acetaminophen, sodium chloride flush, promethazine **OR** ondansetron, acetaminophen **OR** acetaminophen, polyethylene glycol, glucose, dextrose, glucagon (rDNA), dextrose      Intake/Output Summary (Last 24 hours) at 3/10/2021 0804  Last data filed at 3/9/2021 2328  Gross per 24 hour   Intake 960 ml   Output --   Net 960 ml       Physical Exam Performed:    /65   Pulse 53   Temp 98.4 °F (36.9 °C) (Oral)   Resp 18   Wt 117 lb 4.6 oz (53.2 kg)   SpO2 100%   BMI 22.91 kg/m²     General appearance: No apparent distress  Neck: Supple  Respiratory:  Normal respiratory effort. Clear to auscultation, bilaterally without Rales/Wheezes/Rhonchi. Cardiovascular: Regular rate and rhythm with normal S1/S2 without murmurs, rubs or gallops.   Abdomen: Soft, non-tender  Musculoskeletal: No clubbing, cyanosis  Skin: Skin color, texture, turgor normal.  No rashes or lesions. Neurologic:  No focal weakness   Psychiatric: Alert and oriented  Capillary Refill: Brisk,< 3 seconds   Peripheral Pulses: +2 palpable, equal bilaterally       Labs:   Recent Labs     03/09/21  1215 03/09/21  1530 03/10/21  0738   WBC 4.8 4.9 4.5   HGB 9.6* 8.8* 9.3*   HCT 29.4* 27.1* 28.7*    190 184     Recent Labs     03/09/21  1215      K 4.3      CO2 25   BUN 16   CREATININE 1.3*   CALCIUM 9.7     Recent Labs     03/09/21  1215   AST 18   ALT 16   BILITOT 0.3   ALKPHOS 115     No results for input(s): INR in the last 72 hours. Recent Labs     03/09/21  1215 03/09/21  1530 03/09/21  2307   TROPONINI <0.01 <0.01 <0.01       Urinalysis:      Lab Results   Component Value Date    NITRU Negative 10/27/2020    WBCUA 7 10/27/2020    BACTERIA RARE 08/29/2019    RBCUA 229 10/27/2020    BLOODU LARGE 10/27/2020    SPECGRAV 1.022 10/27/2020    GLUCOSEU 250 10/27/2020    GLUCOSEU NEGATIVE 05/14/2012       Radiology:  XR CHEST (2 VW)   Final Result   No acute findings                 Assessment/Plan:    Active Hospital Problems    Diagnosis    Chest pain [R07.9]     1. Chest pain, possible angina, discussed with cardiology yesterday, troponin negative. 2.  Diabetes mellitus, long and short acting insulin  3. CKD, monitor creatinine closely  4. Coronary artery disease, was supposed to follow-up and get a cardiac cath as outpatient, cardiology already consulted  5. Essential hypertension, p.o. medications  6.   Mood disorder, on Seroquel and Cymbalta    DVT Prophylaxis: heparin  Diet: Diet NPO, After Midnight  Code Status: Full Code      Carson Vila MD

## 2021-03-10 NOTE — PROGRESS NOTES
MD notified that the patient has lost IV access and a stress test has been ordered tomorrow. No PICC/Mid line at this time. Attempt an ultrasound guided IV. Nursing supervisor notified. She will be down to attempt. If unable to get the IV she will have an RN from the ED come up through the night to attempt the ultrasound.  Electronically signed by Dinah Rose RN on 3/10/2021 at 6:29 PM

## 2021-03-10 NOTE — PROGRESS NOTES
Call placed to Dr. Joann Narayan to inform him that we are unable to get blood for the PTT. No procedure planned at this time. Will call this RN back with a plan. Heparin gtt continues to infuse at 3.3 mL/hr.  Electronically signed by Scot Colon RN on 3/10/2021 at 3:27 PM

## 2021-03-10 NOTE — TELEPHONE ENCOUNTER
Patient is s/p PCI 11/30/2020 and it is not recommended that she hold the medications for at least 1 year. Is this urgent?

## 2021-03-10 NOTE — PROGRESS NOTES
NP and pharmacist have been notified of critical aPTT. Per pharmacist, heparin gtt paused. Will resume in 1 hr with new rate per pharmacy. Electronically signed by Jonah Gill on 3/10/2021 at 12:06 AM       Call received from pharmacist. New rate for heparin gtt obtained. Gtt to stay paused for full hour. Electronically signed by Jonah Gill on 3/10/2021 at 12:23 AM     0105: Heparin gtt restarted, rate changed to 3.3ml/hr. Verified by 2 RN. Pt c/o chest heaviness, offered Nitro tab, pt declined at this time.  Electronically signed by Jonah Gill on 3/10/2021 at 1:06 AM

## 2021-03-10 NOTE — TELEPHONE ENCOUNTER
[address] [facility] to see if it was urgent and  said there is nothing in the notes stating it is urgent but does need to be done. Patient had appointment with them 3/11/21 but canceled.

## 2021-03-10 NOTE — PROGRESS NOTES
Call placed to Nuclear stress to see if the patient is on the schedule this AM. The patient is not scheduled at this time. Call placed to [facility]. Pt is not scheduled for a stress test at this time. Dr. Yovani Cedillo to see the patient. Ok to administer medications.  Electronically signed by Venkat De RN on 3/10/2021 at 9:03 AM

## 2021-03-10 NOTE — CARE COORDINATION
03/10/21 1046   Readmission Assessment   Number of Days since last admission? 1-7 days   Previous Disposition Other (comment)  (COA aide services)   Who is being Interviewed Patient   What was the patient's/caregiver's perception as to why they think they needed to return back to the hospital? Other (Comment)  (chest pain)   Did you visit your Primary Care Physician after you left the hospital, before you returned this time? No   Why weren't you able to visit your PCP? Did not have an appointment   Did you see a specialist, such as Cardiac, Pulmonary, Orthopedic Physician, etc. after you left the hospital? No   Who advised the patient to return to the hospital? Self-referral   Does the patient report anything that got in the way of taking their medications?  No

## 2021-03-11 VITALS
DIASTOLIC BLOOD PRESSURE: 74 MMHG | OXYGEN SATURATION: 96 % | TEMPERATURE: 98.2 F | RESPIRATION RATE: 16 BRPM | HEART RATE: 61 BPM | WEIGHT: 120.81 LBS | SYSTOLIC BLOOD PRESSURE: 122 MMHG | BODY MASS INDEX: 23.59 KG/M2

## 2021-03-11 LAB
EKG ATRIAL RATE: 87 BPM
EKG DIAGNOSIS: NORMAL
EKG P AXIS: 72 DEGREES
EKG P-R INTERVAL: 100 MS
EKG Q-T INTERVAL: 394 MS
EKG QRS DURATION: 78 MS
EKG QTC CALCULATION (BAZETT): 474 MS
EKG R AXIS: 61 DEGREES
EKG T AXIS: 83 DEGREES
EKG VENTRICULAR RATE: 87 BPM
GLUCOSE BLD-MCNC: 125 MG/DL (ref 70–99)
GLUCOSE BLD-MCNC: 92 MG/DL (ref 70–99)
HCT VFR BLD CALC: 27.3 % (ref 36–48)
HEMOGLOBIN: 9.1 G/DL (ref 12–16)
LV EF: 70 %
LVEF MODALITY: NORMAL
MCH RBC QN AUTO: 32.9 PG (ref 26–34)
MCHC RBC AUTO-ENTMCNC: 33.5 G/DL (ref 31–36)
MCV RBC AUTO: 98.2 FL (ref 80–100)
PDW BLD-RTO: 15.2 % (ref 12.4–15.4)
PERFORMED ON: ABNORMAL
PERFORMED ON: NORMAL
PLATELET # BLD: 178 K/UL (ref 135–450)
PMV BLD AUTO: 8.8 FL (ref 5–10.5)
RBC # BLD: 2.78 M/UL (ref 4–5.2)
WBC # BLD: 4.3 K/UL (ref 4–11)

## 2021-03-11 PROCEDURE — 93010 ELECTROCARDIOGRAM REPORT: CPT | Performed by: INTERNAL MEDICINE

## 2021-03-11 PROCEDURE — 94640 AIRWAY INHALATION TREATMENT: CPT

## 2021-03-11 PROCEDURE — 93017 CV STRESS TEST TRACING ONLY: CPT

## 2021-03-11 PROCEDURE — G0378 HOSPITAL OBSERVATION PER HR: HCPCS

## 2021-03-11 PROCEDURE — 85027 COMPLETE CBC AUTOMATED: CPT

## 2021-03-11 PROCEDURE — 2580000003 HC RX 258: Performed by: INTERNAL MEDICINE

## 2021-03-11 PROCEDURE — 36415 COLL VENOUS BLD VENIPUNCTURE: CPT

## 2021-03-11 PROCEDURE — 6370000000 HC RX 637 (ALT 250 FOR IP): Performed by: INTERNAL MEDICINE

## 2021-03-11 PROCEDURE — 3430000000 HC RX DIAGNOSTIC RADIOPHARMACEUTICAL: Performed by: INTERNAL MEDICINE

## 2021-03-11 PROCEDURE — 94760 N-INVAS EAR/PLS OXIMETRY 1: CPT

## 2021-03-11 PROCEDURE — 78452 HT MUSCLE IMAGE SPECT MULT: CPT

## 2021-03-11 PROCEDURE — A9502 TC99M TETROFOSMIN: HCPCS | Performed by: INTERNAL MEDICINE

## 2021-03-11 PROCEDURE — 6360000002 HC RX W HCPCS: Performed by: INTERNAL MEDICINE

## 2021-03-11 RX ADMIN — BUDESONIDE AND FORMOTEROL FUMARATE DIHYDRATE 2 PUFF: 160; 4.5 AEROSOL RESPIRATORY (INHALATION) at 12:44

## 2021-03-11 RX ADMIN — RANOLAZINE 1000 MG: 500 TABLET, FILM COATED, EXTENDED RELEASE ORAL at 10:28

## 2021-03-11 RX ADMIN — TETROFOSMIN 10 MILLICURIE: 1.38 INJECTION, POWDER, LYOPHILIZED, FOR SOLUTION INTRAVENOUS at 07:23

## 2021-03-11 RX ADMIN — PANTOPRAZOLE SODIUM 40 MG: 40 TABLET, DELAYED RELEASE ORAL at 05:28

## 2021-03-11 RX ADMIN — ACETAMINOPHEN 650 MG: 325 TABLET ORAL at 10:27

## 2021-03-11 RX ADMIN — DICYCLOMINE HYDROCHLORIDE 20 MG: 20 TABLET ORAL at 10:26

## 2021-03-11 RX ADMIN — TETROFOSMIN 30 MILLICURIE: 1.38 INJECTION, POWDER, LYOPHILIZED, FOR SOLUTION INTRAVENOUS at 08:54

## 2021-03-11 RX ADMIN — AMLODIPINE BESYLATE 5 MG: 5 TABLET ORAL at 10:26

## 2021-03-11 RX ADMIN — REGADENOSON 0.4 MG: 0.08 INJECTION, SOLUTION INTRAVENOUS at 08:51

## 2021-03-11 RX ADMIN — DIVALPROEX SODIUM 250 MG: 250 TABLET, EXTENDED RELEASE ORAL at 10:28

## 2021-03-11 RX ADMIN — TORSEMIDE 10 MG: 20 TABLET ORAL at 10:27

## 2021-03-11 RX ADMIN — DICYCLOMINE HYDROCHLORIDE 20 MG: 20 TABLET ORAL at 05:28

## 2021-03-11 RX ADMIN — ASPIRIN 81 MG: 81 TABLET, CHEWABLE ORAL at 10:26

## 2021-03-11 RX ADMIN — DIVALPROEX SODIUM 250 MG: 250 TABLET, EXTENDED RELEASE ORAL at 14:32

## 2021-03-11 RX ADMIN — METOPROLOL SUCCINATE 25 MG: 25 TABLET, EXTENDED RELEASE ORAL at 10:37

## 2021-03-11 RX ADMIN — Medication 10 ML: at 10:29

## 2021-03-11 RX ADMIN — DULOXETINE HYDROCHLORIDE 60 MG: 60 CAPSULE, DELAYED RELEASE ORAL at 10:26

## 2021-03-11 RX ADMIN — CLOPIDOGREL BISULFATE 75 MG: 75 TABLET ORAL at 10:27

## 2021-03-11 RX ADMIN — HYDRALAZINE HYDROCHLORIDE 50 MG: 50 TABLET, FILM COATED ORAL at 10:27

## 2021-03-11 ASSESSMENT — PAIN DESCRIPTION - PROGRESSION
CLINICAL_PROGRESSION: NOT CHANGED
CLINICAL_PROGRESSION: NOT CHANGED
CLINICAL_PROGRESSION: RESOLVED
CLINICAL_PROGRESSION: NOT CHANGED

## 2021-03-11 ASSESSMENT — PAIN DESCRIPTION - FREQUENCY
FREQUENCY: CONTINUOUS
FREQUENCY: INTERMITTENT
FREQUENCY: CONTINUOUS

## 2021-03-11 ASSESSMENT — PAIN SCALES - GENERAL
PAINLEVEL_OUTOF10: 0
PAINLEVEL_OUTOF10: 0
PAINLEVEL_OUTOF10: 1

## 2021-03-11 ASSESSMENT — PAIN DESCRIPTION - DESCRIPTORS
DESCRIPTORS: HEADACHE
DESCRIPTORS: HEADACHE

## 2021-03-11 ASSESSMENT — PAIN - FUNCTIONAL ASSESSMENT: PAIN_FUNCTIONAL_ASSESSMENT: PREVENTS OR INTERFERES SOME ACTIVE ACTIVITIES AND ADLS

## 2021-03-11 ASSESSMENT — PAIN DESCRIPTION - PAIN TYPE: TYPE: ACUTE PAIN

## 2021-03-11 ASSESSMENT — PAIN DESCRIPTION - ORIENTATION: ORIENTATION: MID

## 2021-03-11 NOTE — PROGRESS NOTES
Pt in bed. A&Ox4. Awaiting transport for stress test. Pt NPO at this time. No concerns this shift. Call light within reach.

## 2021-03-11 NOTE — CARE COORDINATION
Sw spoke with patient regarding dc today; informed her skilled home care was not ordered. She plans to return home and resume COA services. Sw spoke with patient's COA CM, Tess Myers, regarding dc. She requested AVS be faxed to her at [phone]--[facility].     Electronically signed by CUATE Cha, RACHID, Case Management on 3/11/2021 at 2:10 PM  [facility] [phone]

## 2021-03-11 NOTE — PROGRESS NOTES
Nursing supervisor Gideon Avila, unable to place IV. PICC team member currently on unit, agreed to attempt ultrasound guided IV. PICC team member unable to place IV, nursing supervisor notified. Nursing supervisor states that someone from the ER will attempt ultrasound guided IV.

## 2021-03-11 NOTE — PROGRESS NOTES
Hospital Medicine Discharge Summary    Patient ID: Lois Hernandez      Patient's PCP: Launa Kawasaki, MD    Admit Date: 3/9/2021     Discharge Date:   03/11/2021    Admitting Physician: Kole Flores MD     Discharge Physician: Kole Flores MD     Discharge Diagnoses: Active Hospital Problems    Diagnosis    Chest pain [R07.9]       The patient was seen and examined on day of discharge and this discharge summary is in conjunction with any daily progress note from day of discharge. Hospital Course:     From HPI:\"65 y.o. female who presented to [facility] with chest pain, pain started at 9 9:30 AM, left-sided radiating to her back and her neck 8 out of 10 intensity dull, pressure-like no obvious alleviating or aggravating factors beside nitroglycerin improved the pain but never went away, came to emergency department in route she got nitroglycerin with partial relief in emergency department EKG nonspecific, patient felt better, denies any shortness of breath nausea vomiting or abdominal pain. Patient being admitted for further management and treatment\"      1.  Chest pain, possible angina, discussed with cardiology yesterday, troponin negative, stress test negative, discussed again with cardiology today and ok to discharge, discussed plan with patient, all questions answered, advised to seek immediate medical help in case of worsening. .   2.  Diabetes mellitus, long and short acting insulin  3.  CKD, monitor creatinine closely  4.  Coronary artery disease, was supposed to follow-up and get a cardiac cath as outpatient, cardiology already consulted  5.  Essential hypertension, p.o. medications  6.  Mood disorder, on Seroquel and Cymbalta  7. Headache, resolved.       Physical Exam Performed:     /74   Pulse 61   Temp 98.2 °F (36.8 °C) (Oral)   Resp 16   Wt 120 lb 13 oz (54.8 kg)   SpO2 96%   BMI 23.59 kg/m²       General appearance:  No apparent distress  HEENT:  Normal MOUTH DAILY  Qty: 30 capsule, Refills: 1    Associated Diagnoses: Essential hypertension      insulin glargine (BASAGLAR KWIKPEN) 100 UNIT/ML injection pen Inject 8 Units into the skin nightly  Qty: 5 pen, Refills: 3      DULoxetine (CYMBALTA) 60 MG extended release capsule TAKE 1 CAPSULE BY MOUTH DAILY  Qty: 30 capsule, Refills: 1    Associated Diagnoses: Chronic pain syndrome; Fibromyalgia      oxyCODONE-acetaminophen (PERCOCET) 7.5-325 MG per tablet Take 1 tablet by mouth every 6 hours as needed for Pain (max 3-4 day) for up to 28 days. Qty: 84 tablet, Refills: 0    Comments: Reduce doses taken as pain becomes manageable  Associated Diagnoses: Chronic pain syndrome; Chronic painful diabetic neuropathy (HCC); DDD (degenerative disc disease), cervical; DDD (degenerative disc disease), lumbar; Fibromyalgia; Rotator cuff tendonitis, right; Tendinitis of right rotator cuff      metoprolol succinate (TOPROL XL) 50 MG extended release tablet TAKE 0.5 TABLETS BY MOUTH 2 TIMES DAILY (WITH MEALS)  Qty: 30 tablet, Refills: 5    Associated Diagnoses: Essential hypertension; Coronary artery disease involving native coronary artery of native heart without angina pectoris      rizatriptan (MAXALT) 10 MG tablet Take 1 tablet by mouth once as needed for Migraine May repeat in 2 hours if needed  Qty: 9 tablet, Refills: 0      QUEtiapine (SEROQUEL) 25 MG tablet TAKE 1-2 TABLETS BY MOUTH NIGHTLY  Qty: 60 tablet, Refills: 1    Associated Diagnoses: Chronic pain syndrome      aspirin 81 MG chewable tablet Take 1 tablet by mouth daily  Qty: 30 tablet, Refills: 3      dicyclomine (BENTYL) 20 MG tablet Take 20 mg by mouth 4 times daily (before meals and nightly)       nitroGLYCERIN (NITROSTAT) 0.4 MG SL tablet Place 1 tablet under the tongue every 5 minutes as needed for Chest pain up to max of 3 total doses. If no relief after 1 dose, call 911.   Qty: 25 tablet, Refills: 3    Associated Diagnoses: Coronary artery disease involving native coronary artery of native heart without angina pectoris      Nutritional Supplements (ENSURE) LIQD Take 1 Can by mouth 3 times daily as needed (nutrition)  Qty: 90 Can, Refills: 5      hydrALAZINE (APRESOLINE) 50 MG tablet Take 1 tablet by mouth 2 times daily  Qty: 60 tablet, Refills: 5    Associated Diagnoses: Essential hypertension      atorvastatin (LIPITOR) 80 MG tablet Take 1 tablet by mouth nightly  Qty: 90 tablet, Refills: 5    Associated Diagnoses: Coronary artery disease involving native coronary artery of native heart without angina pectoris;  Uncontrolled type 2 diabetes mellitus with complication, with long-term current use of insulin ([facility])      amLODIPine (NORVASC) 5 MG tablet Take 1 tablet by mouth 2 times daily  Qty: 180 tablet, Refills: 5    Associated Diagnoses: Essential hypertension      ondansetron (ZOFRAN) 4 MG tablet Take 1 tablet by mouth 3 times daily as needed for Nausea or Vomiting  Qty: 30 tablet, Refills: 0      clopidogrel (PLAVIX) 75 MG tablet TAKE 1 TABLET BY MOUTH DA JULIEN  Qty: 90 tablet, Refills: 5    Associated Diagnoses: Coronary artery disease involving native coronary artery of native heart without angina pectoris      albuterol (PROVENTIL) (2.5 MG/3ML) 0.083% nebulizer solution Take 3 mLs by nebulization every 4 hours as needed for Wheezing  Qty: 120 each, Refills: 5    Associated Diagnoses: COPD with acute bronchitis (HCC)      budesonide-formoterol (SYMBICORT) 160-4.5 MCG/ACT AERO Inhale 2 puffs into the lungs daily  Qty: 2 Inhaler, Refills: 5    Associated Diagnoses: COPD with acute bronchitis (HCC)      albuterol sulfate HFA (VENTOLIN HFA) 108 (90 Base) MCG/ACT inhaler Inhale 2 puffs into the lungs every 4 hours as needed for Wheezing or Shortness of Breath  Qty: 3 Inhaler, Refills: 5    Associated Diagnoses: COPD with acute bronchitis (HCC)      ranolazine (RANEXA) 1000 MG extended release tablet Take 1 tablet by mouth 2 times daily  Qty: 60 tablet, Refills: 8 Associated Diagnoses: Coronary artery disease involving native coronary artery of native heart without angina pectoris      sulfamethoxazole-trimethoprim (BACTRIM DS;SEPTRA DS) 800-160 MG per tablet Take 1 tablet by mouth 2 times daily for 5 days  Qty: 10 tablet, Refills: 0      AIMOVIG 70 MG/ML SOAJ INJECT 140 MLS INTO THE SKIN EVERY 30 DAYS  Qty: 1 mL, Refills: 1    Associated Diagnoses: Chronic pain syndrome      lidocaine (XYLOCAINE) 5 % ointment Apply topically as needed. Qty: 5 g, Refills: 3    Associated Diagnoses: Fall, initial encounter      blood glucose test strips (TRUE METRIX BLOOD GLUCOSE TEST) strip 1 each by Does not apply route 2 times daily  Qty: 100 each, Refills: 5    Associated Diagnoses: Uncontrolled type 2 diabetes mellitus with complication, with long-term current use of insulin ([facility] [address]             Time Spent on discharge is more than 45 minutes in the examination, evaluation, counseling and review of medications and discharge plan. Signed:    Kole Flores MD   3/11/2021      Thank you Launa Kawasaki, MD for the opportunity to be involved in this patient's care. If you have any questions or concerns please feel free to contact me at [phone].

## 2021-03-11 NOTE — DISCHARGE INSTR - COC
Continuity of Care Form    Patient Name: Rishabh Jaramillo   :  1955  MRN:  [id number]    Admit date:  3/9/2021  Discharge date:  ***    Code Status Order: Full Code   Advance Directives:   Advance Care Flowsheet Documentation     Date/Time Healthcare Directive Type of Healthcare Directive Copy in [address] Agent's Name Healthcare Agent's Phone Number    [phone]  Yes, patient has an advance directive for healthcare treatment  Health care treatment directive; Living will  Yes, copy in chart  Healthcare power of   Claudette Simple  [phone]          Admitting Physician:  Jc Tinsley MD  PCP: Patricio Wilson MD    Discharging Nurse: [facility] Unit/Room#: U8D-8029/3111-01  Discharging Unit Phone Number: ***    Emergency Contact:   Extended Emergency Contact Information  Primary Emergency Contact: [address] [place] [address] Phone: [phone]  Mobile Phone: [phone]  Relation: Child  Secondary Emergency Contact: Angely Laird  Address: [address] Phone: [phone]  Mobile Phone: [phone]  Relation: Child    Past Surgical History:  Past Surgical History:   Procedure Laterality Date    ABLATION OF DYSRHYTHMIC FOCUS      ARTERY BIOPSY Right 2014    RIGHT TEMPORAL ARTERY BIOPSY    CATARACT REMOVAL Bilateral     CHOLECYSTECTOMY      CORONARY ANGIOPLASTY WITH STENT PLACEMENT      CORONARY ANGIOPLASTY WITH STENT PLACEMENT      HYSTERECTOMY      JOINT REPLACEMENT Right     KNEE ARTHROSCOPY Right     KNEE SURGERY      right knee replacement    PTCA  10/2019    LAD and RCA inrtervention    TUNNELED VENOUS PORT PLACEMENT      left thigh.   SMART PORT    UPPER GASTROINTESTINAL ENDOSCOPY N/A 2020    EGD DIAGNOSTIC ONLY performed by Jessica Holloway MD at [address] [place] [address]         Immunization History:   Immunization History   Administered Date(s) Gastro-esophageal reflux disease without esophagitis K21.9    A-fib ([facility]) I48.91    Abscess of groin, right L02.214    Precordial chest pain R07.2    Coronary artery disease involving native coronary artery of native heart with angina pectoris ([facility]) I25.119    COPD exacerbation ([facility]) J44.1    DM (diabetes mellitus), type 2, uncontrolled ([facility]) E11.65    Right knee pain M25.561    Chronic painful diabetic neuropathy ([facility]) E11.40    CAD in native artery I25.10    Dyspnea on exertion R06.00    Unstable angina ([facility]) I20.0    Sepsis ([facility]) A41.9    Acute respiratory failure with hypoxia ([facility]) J96.01    Generalized weakness R53.1    Reactive airway disease with wheezing with acute exacerbation J45. 0    Headache R51.9    DMII (diabetes mellitus, type 2) ([facility]) S13.6    Metabolic acidosis J16.7    Acute-on-chronic renal failure ([facility]) N17.9, N18.9    Age-related nuclear cataract, bilateral H25.13    Chronic prescription opiate use Z79.891    Coronary stent restenosis due to progression of disease T82.855A, I25.10    Current moderate episode of major depressive disorder ([facility] [address] F32.1    Diabetic retinopathy of both eyes without macular edema associated with type 2 diabetes mellitus ([facility]) E11.319    Hypertensive heart and chronic kidney disease with heart failure and stage 1 through stage 4 chronic kidney disease, or unspecified chronic kidney disease ([facility]) I13.0    Hypertensive retinopathy, bilateral H35.033    Ischemic cardiomyopathy I25.5    Moderate episode of recurrent major depressive disorder ([facility]) F33.1    Nausea R11.0    Other age-related incipient cataract, bilateral H25.093    Presence of intraocular lens Z96.1    Punctate keratitis, bilateral H16.143    Regular astigmatism, bilateral H52.223    Palliative care encounter Z51.5    CKD (chronic kidney disease) stage 3, GFR 30-59 ml/min N18.30    Compression of brain ([facility]) G93.5    Thrombosis of superior vena cava, 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 109    Angina at rest [facility]) I20.8    Chest pain R07.9       Isolation/Infection:   Isolation          No Isolation        Patient Infection Status     Infection Onset Added Last Indicated Last Indicated By Review Planned Expiration Resolved Resolved By    None active    Resolved    COVID-19 Rule Out 07/06/20 07/06/20 07/06/20 COVID-19 (Ordered)   07/06/20 Rule-Out Test Resulted          Nurse Assessment:  Last Vital Signs: /74   Pulse 61   Temp 98.2 °F (36.8 °C) (Oral)   Resp 16   Wt 120 lb 13 oz (54.8 kg)   SpO2 96%   BMI 23.59 kg/m²     Last documented pain score (0-10 scale): Pain Level: 0  Last Weight:   Wt Readings from Last 1 Encounters:   03/11/21 120 lb 13 oz (54.8 kg)     Mental Status:  {IP PT MENTAL STATUS:20030}    IV Access:  { RONNIE IV ACCESS:385905570}    Nursing Mobility/ADLs:  Walking   {CHP DME VAEB:915556572}  Transfer  {P DME BPXC:362635301}  Bathing  {CHP DME AHDI:471706012}  Dressing  {CHP DME OFQM:010076536}  Toileting  {CHP DME PVLT:097935431}  Feeding  {[facility] DME GJUH:405065686}  Med Admin  {P DME TJIY:894178973}  Med Delivery   { RONNIE MED Delivery:787281275}    Wound Care Documentation and Therapy:        Elimination:  Continence:   · Bowel: {YES / AB:19411}  · Bladder: {YES / AX:13182}  Urinary Catheter: {Urinary Catheter:266966953}   Colostomy/Ileostomy/Ileal Conduit: {YES / CR:88466}       Date of Last BM: ***    Intake/Output Summary (Last 24 hours) at 3/11/2021 1526  Last data filed at 3/11/2021 1411  Gross per 24 hour   Intake 840 ml   Output --   Net 840 ml     I/O last 3 completed shifts:   In: 840 [P.O.:840]  Out: -     Safety Concerns:     [address] Concerns:283838439}    Impairments/Disabilities:      [address] completion of advance directives [phone].  Consider: [place] [address] telehealth program if patient agreeable and able to participate, palliative care consult for ongoing goals of care, end of life, and/or chronic disease management discussions and referral to [facility] [phone] once SNF/HHC complete. Dr Cassia Reis is 's primary cardiologist.       Rehab Therapies: {THERAPEUTIC INTERVENTION:9533992335}  Weight Bearing Status/Restrictions: 50Parker Weeks CC Weight Bearin}  Other Medical Equipment (for information only, NOT a DME order):  {EQUIPMENT:854672309}  Other Treatments: ***    Patient's personal belongings (please select all that are sent with patient):  {CHP DME Belongings:998296114}    RN SIGNATURE:  {Esignature:524350677}    CASE MANAGEMENT/SOCIAL WORK SECTION    Inpatient Status Date: ***    Readmission Risk Assessment Score:  Readmission Risk              Risk of Unplanned Readmission:        0           Discharging to Facility/ Agency   · Name:   · Address:  · Phone:  · Fax:    Dialysis Facility (if applicable)   · Name:  · Address:  · Dialysis Schedule:  · Phone:  · Fax:    / signature: {Esignature:436447225}    PHYSICIAN SECTION    Prognosis: {Prognosis:0169215089}    Condition at Discharge: 50P[address] Weeks Patient Condition:948910300}    Rehab Potential (if transferring to Rehab): {Prognosis:5592584252}    Recommended Labs or Other Treatments After Discharge: ***    Physician Certification: I certify the above information and transfer of Scooby Maravilla  is necessary for the continuing treatment of the diagnosis listed and that she requires {Admit to Appropriate Level of Care:05814} for {GREATER/LESS:718441564} 30 days.      Update Admission H&P: {CHP DME Changes in NUQAM:778748871}    PHYSICIAN SIGNATURE:  {Esignature:618879198}

## 2021-03-11 NOTE — PLAN OF CARE
Problem: Pain:  Goal: Pain level will decrease  Description: Pain level will decrease  Outcome: Ongoing  Note: Pain level accessed. PRN tylenol given for headache. Will continue to monitor for any increase in pain. Goal: Control of acute pain  Description: Control of acute pain  Outcome: Ongoing  Note: Pain level accessed. PRN tylenol given for headache. Will continue to monitor for any increase in pain. Goal: Control of chronic pain  Description: Control of chronic pain  Outcome: Ongoing  Note: Pain level accessed. PRN tylenol given for headache. Will continue to monitor for any increase in pain. Problem: Falls - Risk of:  Goal: Will remain free from falls  Description: Will remain free from falls  Outcome: Ongoing  Note: Pt educated on need to call for assistance. No falls this shift. Goal: Absence of physical injury  Description: Absence of physical injury  Outcome: Ongoing  Note: No new injuries this shift. Problem: OXYGENATION/RESPIRATORY FUNCTION  Goal: Patient will maintain patent airway  Outcome: Ongoing  Note: Patient maintained patent airway this shift. Goal: Patient will achieve/maintain normal respiratory rate/effort  Description: Respiratory rate and effort will be within normal limits for the patient  Outcome: Ongoing  Note: Pt breathing easy, unlabored, and paced. No concerns this shift with breathing. Problem: HEMODYNAMIC STATUS  Goal: Patient has stable vital signs and fluid balance  Outcome: Ongoing  Note: Pt VS stable. No issues this shift with fluid balance. Problem: FLUID AND ELECTROLYTE IMBALANCE  Goal: Fluid and electrolyte balance are achieved/maintained  Outcome: Ongoing  Note: Pt displays no s/s of electrolyte imbalance. Problem: ACTIVITY INTOLERANCE/IMPAIRED MOBILITY  Goal: Mobility/activity is maintained at optimum level for patient  Outcome: Ongoing  Note: Pt standby assist x1. No concerns this shift.

## 2021-03-11 NOTE — PROGRESS NOTES
Data- discharge order received, pt verbalized agreement to discharge, disposition to previous residence, no needs for HHC/DME. Action- discharge instructions prepared and given to patient, pt verbalized understanding. Medication information packet given r/t NEW and/or CHANGED prescriptions emphasizing name/purpose/side effects, pt verbalized understanding. Discharge instruction summary: Diet- CC/low sodium, Activity- UAL, Primary Care Physician as followsEster Calle -[phone] f/u appointment in one week, immunizations reviewed and are up to date, no prescriptions ordered. Response- Pt belongings gathered, IV removed. Disposition is home (no HHC/DME needs), pt walked independently from unit, no complications.

## 2021-03-11 NOTE — PLAN OF CARE
Problem: Pain:  Goal: Pain level will decrease  Description: Pain level will decrease  3/10/2021 2154 by Kita Castro RN  Outcome: Ongoing  Note: Educated patient on pain management. Will assess patients pain level per unit protocol, and provide pain management measures as needed. 3/10/2021 1603 by Yane Berrios RN  Outcome: Ongoing  Note: Pt assessed for pain. Pt denies any pain at this time. Will continue to monitor pt and assess for pain throughout rest of shift. Goal: Control of acute pain  Description: Control of acute pain  3/10/2021 2154 by Kita Castro RN  Outcome: Ongoing  Note: Patient educated on acute pain. Taught patient to use call light to ask for pain medication. PRN pain medication given for acute pain. Will continue to monitor pain per unit protocol. 3/10/2021 1603 by Yane Berrios RN  Outcome: Ongoing  Note: Pt assessed for pain. Pt denies any pain at this time. Will continue to monitor pt and assess for pain throughout rest of shift. Goal: Control of chronic pain  Description: Control of chronic pain  3/10/2021 2154 by Kita Castro RN  Outcome: Ongoing  Note: Patient educated on chronic pain. Taught patient to use call light to ask for pain medication. Will continue to monitor pain per unit protocol. 3/10/2021 1603 by Yane Berrios RN  Outcome: Ongoing  Note: Pt assessed for pain. Pt denies any pain at this time. Will continue to monitor pt and assess for pain throughout rest of shift. Problem: Falls - Risk of:  Goal: Will remain free from falls  Description: Will remain free from falls  3/10/2021 2154 by Kita Castro RN  Outcome: Ongoing  Note: Patient educated on fall prevention. Call light is within reach, bed locked in lowest position, personal items within reach, and bed alarm is on. Will round on patient per unit guidelines. 3/10/2021 1603 by Yane Berrios RN  Outcome: Ongoing  Note: Fall risk assessment completed.  Fall precautions in place. Call light within reach. Pt educated on calling for assistance before getting up. Walkway free of clutter. Will continue to monitor. Goal: Absence of physical injury  Description: Absence of physical injury  3/10/2021 2154 by Emma Keys RN  Outcome: Ongoing  Note: Pt assessed for fall risk and fall precautions put into place. Bed in lowest position and wheels locked, call light within reach. Nonskid footwear in place. Patient educated on appropriate method of transfer and to call for assistance. 3/10/2021 1603 by Muna Benson RN  Outcome: Ongoing  Note: Pt is free of injury. No injury noted. Fall precautions in place. Call light within reach. Will monitor. Problem: OXYGENATION/RESPIRATORY FUNCTION  Goal: Patient will maintain patent airway  3/10/2021 2154 by Emma Keys RN  Outcome: Ongoing  Note: Patient will maintain patent airway. 3/10/2021 1603 by Muna Benson RN  Outcome: Ongoing  Note: Pt maintaining a patent airway. No distress noted. Will monitor. Goal: Patient will achieve/maintain normal respiratory rate/effort  Description: Respiratory rate and effort will be within normal limits for the patient  3/10/2021 2154 by Emma Keys RN  Outcome: Ongoing  Note: Patient respiratory rate within normal limits. Will continue to monitor throughout the shift. 3/10/2021 1603 by Muna Benson RN  Outcome: Ongoing  Note: Pt maintaining a normal respiratory rate/effort. Will monitor. Problem: HEMODYNAMIC STATUS  Goal: Patient has stable vital signs and fluid balance  3/10/2021 2154 by Emma Keys RN  Outcome: Ongoing  Note: Vitals signs are stable. Intake and output are being recorded, will continue to monitor    3/10/2021 1603 by Muna Benson RN  Outcome: Ongoing  Note: Pt maintaining normal vital signs. Will monitor.       Problem: FLUID AND ELECTROLYTE IMBALANCE  Goal: Fluid and electrolyte balance are achieved/maintained  3/10/2021 2154 by Windy Gibbons Bhakti Pedraza RN  Outcome: Ongoing  Note: Patient will achieve and maintain fluid and electrolyte balance. 3/10/2021 1603 by Ananya Ling RN  Outcome: Ongoing  Note: Pt maintaining electrolyte balance with replacement. Will monitor. Problem: ACTIVITY INTOLERANCE/IMPAIRED MOBILITY  Goal: Mobility/activity is maintained at optimum level for patient  3/10/2021 2154 by Ashley Chamorro RN  Outcome: Ongoing  Note: Early and frequent ambulqtion encouraged. Educated patient on importance of early ambulation. Patient assisted with ambulation. Will continue to monitor. 3/10/2021 1603 by Ananya Ling RN  Outcome: Ongoing  Note: Pt maintaining mobility. Ambulating with walker and SBA x1.

## 2021-03-11 NOTE — PROGRESS NOTES
Patient complaining of a headache, rating 7 on the 0-10 pain scale. Patient requesting percocet for pain relief instead of tylenol. See MAR.

## 2021-03-12 NOTE — DISCHARGE SUMMARY
[facility] [place] [facility] Medicine Discharge Summary     Patient ID: Rishabh Jaramillo                 Patient's PCP: Patricio Wilson MD     Admit Date: 3/9/2021      Discharge Date:   03/11/2021     Admitting Physician: Jc Tinsley MD     Discharge Physician: Jc Tinsley MD      Discharge Diagnoses:            Active Hospital Problems     Diagnosis    Chest pain [R07.9]         The patient was seen and examined on day of discharge and this discharge summary is in conjunction with any daily progress note from day of discharge.     Hospital Course:      From HPI:\"65 y. o. female who presented to [facility] chest pain, pain started at 9 9:30 AM, left-sided radiating to her back and her neck 8 out of 10 intensity dull, pressure-like no obvious alleviating or aggravating factors beside nitroglycerin improved the pain but never went away, came to emergency department in route she got nitroglycerin with partial relief in emergency department EKG nonspecific, patient felt better, denies any shortness of breath nausea vomiting or abdominal pain.  Patient being admitted for further management and treatment\"        1.  Chest pain, possible angina, discussed with cardiology yesterday, troponin negative, stress test negative, discussed again with cardiology today and ok to discharge, discussed plan with patient, all questions answered, advised to seek immediate medical help in case of worsening. .   2.  Diabetes mellitus, long and short acting insulin  3.  CKD, monitor creatinine closely  4.  Coronary artery disease, was supposed to follow-up and get a cardiac cath as outpatient, cardiology already consulted  5.  Essential hypertension, p.o. medications  6.  Mood disorder, on Seroquel and Cymbalta  7.  Headache, resolved.        Physical Exam Performed:      /74   Pulse 61   Temp 98.2 °F (36.8 °C) (Oral)   Resp 16   Wt 120 lb 13 oz (54.8 kg)   SpO2 96%   BMI 23.59 kg/m²         General appearance:  No apparent distress  HEENT:  Normal cephalic  Neck: Supple  Respiratory:  Normal respiratory effort. Clear to auscultation, bilaterally without Rales/Wheezes/Rhonchi. Cardiovascular:  Regular rate and rhythm with normal S1/S2 without murmurs, rubs or gallops. Abdomen: Soft, non-tender, non-distended  Musculoskeletal:  No clubbing, cyanosis   Skin: Skin color, texture, turgor normal.  No rashes or lesions. Neurologic:  No focal weakness   Psychiatric:  Alert and oriented  Capillary Refill: Brisk,< 3 seconds   Peripheral Pulses: +2 palpable, equal bilaterally         Labs:  For convenience and continuity at follow-up the following most recent labs are provided:        CBC:          Lab Results   Component Value Date     WBC 4.3 03/11/2021     HGB 9.1 03/11/2021     HCT 27.3 03/11/2021      03/11/2021         Renal:          Lab Results   Component Value Date      03/09/2021     K 4.3 03/09/2021      03/09/2021     CO2 25 03/09/2021     BUN 16 03/09/2021     CREATININE 1.3 03/09/2021     CALCIUM 9.7 03/09/2021     PHOS 4.3 05/12/2020            Significant Diagnostic Studies     Radiology:   NM Cardiac Stress Test Nuclear Imaging   Final Result       XR CHEST (2 VW)   Final Result   No acute findings                    Consults:      IP CONSULT TO CARDIOLOGY     Disposition:  home      Condition at Discharge: Stable     Discharge Instructions/Follow-up:  PCP, Cardiology      Code Status:  Full Code      Activity: activity as tolerated     Diet: cardiac diet and diabetic diet        Discharge Medications:      Discharge Medications         Current Discharge Medication List                  Details   insulin aspart (NOVOLOG FLEXPEN) 100 UNIT/ML injection pen Inject 3 Units into the skin 3 times daily (before meals)       torsemide (DEMADEX) 10 MG tablet Take 10 mg by mouth daily       tiZANidine (ZANAFLEX) 4 MG tablet Take 4 mg by mouth daily as needed       divalproex (DEPAKOTE ER) 250 MG extended release tablet Take 250 mg by mouth 3 times daily       omeprazole (PRILOSEC) 20 MG delayed release capsule TAKE 1 CAPSULE BY MOUTH DAILY  Qty: 30 capsule, Refills: 1     Associated Diagnoses: Essential hypertension       insulin glargine (BASAGLAR KWIKPEN) 100 UNIT/ML injection pen Inject 8 Units into the skin nightly  Qty: 5 pen, Refills: 3       DULoxetine (CYMBALTA) 60 MG extended release capsule TAKE 1 CAPSULE BY MOUTH DAILY  Qty: 30 capsule, Refills: 1     Associated Diagnoses: Chronic pain syndrome; Fibromyalgia       oxyCODONE-acetaminophen (PERCOCET) 7.5-325 MG per tablet Take 1 tablet by mouth every 6 hours as needed for Pain (max 3-4 day) for up to 28 days. Qty: 84 tablet, Refills: 0     Comments: Reduce doses taken as pain becomes manageable  Associated Diagnoses: Chronic pain syndrome; Chronic painful diabetic neuropathy (HCC); DDD (degenerative disc disease), cervical; DDD (degenerative disc disease), lumbar; Fibromyalgia;  Rotator cuff tendonitis, right; Tendinitis of right rotator cuff       metoprolol succinate (TOPROL XL) 50 MG extended release tablet TAKE 0.5 TABLETS BY MOUTH 2 TIMES DAILY (WITH MEALS)  Qty: 30 tablet, Refills: 5     Associated Diagnoses: Essential hypertension; Coronary artery disease involving native coronary artery of native heart without angina pectoris       rizatriptan (MAXALT) 10 MG tablet Take 1 tablet by mouth once as needed for Migraine May repeat in 2 hours if needed  Qty: 9 tablet, Refills: 0       QUEtiapine (SEROQUEL) 25 MG tablet TAKE 1-2 TABLETS BY MOUTH NIGHTLY  Qty: 60 tablet, Refills: 1     Associated Diagnoses: Chronic pain syndrome       aspirin 81 MG chewable tablet Take 1 tablet by mouth daily  Qty: 30 tablet, Refills: 3       dicyclomine (BENTYL) 20 MG tablet Take 20 mg by mouth 4 times daily (before meals and nightly)        nitroGLYCERIN (NITROSTAT) 0.4 MG SL tablet Place 1 tablet under the tongue every 5 minutes as needed for Chest pain up to max of 3 total doses. If no relief after 1 dose, call 911. Qty: 25 tablet, Refills: 3     Associated Diagnoses: Coronary artery disease involving native coronary artery of native heart without angina pectoris       Nutritional Supplements (ENSURE) LIQD Take 1 Can by mouth 3 times daily as needed (nutrition)  Qty: 90 Can, Refills: 5       hydrALAZINE (APRESOLINE) 50 MG tablet Take 1 tablet by mouth 2 times daily  Qty: 60 tablet, Refills: 5     Associated Diagnoses: Essential hypertension       atorvastatin (LIPITOR) 80 MG tablet Take 1 tablet by mouth nightly  Qty: 90 tablet, Refills: 5     Associated Diagnoses: Coronary artery disease involving native coronary artery of native heart without angina pectoris;  Uncontrolled type 2 diabetes mellitus with complication, with long-term current use of insulin ([facility])       amLODIPine (NORVASC) 5 MG tablet Take 1 tablet by mouth 2 times daily  Qty: 180 tablet, Refills: 5     Associated Diagnoses: Essential hypertension       ondansetron (ZOFRAN) 4 MG tablet Take 1 tablet by mouth 3 times daily as needed for Nausea or Vomiting  Qty: 30 tablet, Refills: 0       clopidogrel (PLAVIX) 75 MG tablet TAKE 1 TABLET BY MOUTH DA JULIEN  Qty: 90 tablet, Refills: 5     Associated Diagnoses: Coronary artery disease involving native coronary artery of native heart without angina pectoris       albuterol (PROVENTIL) (2.5 MG/3ML) 0.083% nebulizer solution Take 3 mLs by nebulization every 4 hours as needed for Wheezing  Qty: 120 each, Refills: 5     Associated Diagnoses: COPD with acute bronchitis (HCC)       budesonide-formoterol (SYMBICORT) 160-4.5 MCG/ACT AERO Inhale 2 puffs into the lungs daily  Qty: 2 Inhaler, Refills: 5     Associated Diagnoses: COPD with acute bronchitis (HCC)       albuterol sulfate HFA (VENTOLIN HFA) 108 (90 Base) MCG/ACT inhaler Inhale 2 puffs into the lungs every 4 hours as needed for Wheezing or Shortness of Breath  Qty: 3 Inhaler, Refills: 5     Associated Diagnoses: COPD with acute bronchitis (HCC)       ranolazine (RANEXA) 1000 MG extended release tablet Take 1 tablet by mouth 2 times daily  Qty: 60 tablet, Refills: 8     Associated Diagnoses: Coronary artery disease involving native coronary artery of native heart without angina pectoris       sulfamethoxazole-trimethoprim (BACTRIM DS;SEPTRA DS) 800-160 MG per tablet Take 1 tablet by mouth 2 times daily for 5 days  Qty: 10 tablet, Refills: 0       AIMOVIG 70 MG/ML SOAJ INJECT 140 MLS INTO THE SKIN EVERY 30 DAYS  Qty: 1 mL, Refills: 1     Associated Diagnoses: Chronic pain syndrome       lidocaine (XYLOCAINE) 5 % ointment Apply topically as needed. Qty: 5 g, Refills: 3     Associated Diagnoses: Fall, initial encounter       blood glucose test strips (TRUE METRIX BLOOD GLUCOSE TEST) strip 1 each by Does not apply route 2 times daily  Qty: 100 each, Refills: 5     Associated Diagnoses: Uncontrolled type 2 diabetes mellitus with complication, with long-term current use of insulin (HCC)                    Time Spent on discharge is more than 45 minutes in the examination, evaluation, counseling and review of medications and discharge plan.        Signed:     Kole Flores MD        3/11/2021        Thank you Launa Kawasaki, MD for the opportunity to be involved in this patient's care. If you have any questions or concerns please feel free to contact me at [phone].

## 2021-03-16 ENCOUNTER — TELEPHONE (OUTPATIENT)
Dept: PRIMARY CARE CLINIC | Age: 66
End: 2021-03-16

## 2021-03-16 NOTE — TELEPHONE ENCOUNTER
Has phone number for patient as [phone] but she has not answered for two weeks that pharmacist has called. Scripts has been ready since, informed I cannot give out information per HIPAA. Wondering if office can get a hold of patient to have her call pharmacy, would be great.

## 2021-03-19 ENCOUNTER — VIRTUAL VISIT (OUTPATIENT)
Dept: PAIN MANAGEMENT | Age: 66
End: 2021-03-19
Payer: COMMERCIAL

## 2021-03-19 DIAGNOSIS — M79.7 FIBROMYALGIA: ICD-10-CM

## 2021-03-19 DIAGNOSIS — M50.30 DDD (DEGENERATIVE DISC DISEASE), CERVICAL: ICD-10-CM

## 2021-03-19 DIAGNOSIS — G89.4 CHRONIC PAIN SYNDROME: ICD-10-CM

## 2021-03-19 DIAGNOSIS — M75.81 TENDINITIS OF RIGHT ROTATOR CUFF: ICD-10-CM

## 2021-03-19 DIAGNOSIS — M51.36 DDD (DEGENERATIVE DISC DISEASE), LUMBAR: ICD-10-CM

## 2021-03-19 DIAGNOSIS — E11.40 CHRONIC PAINFUL DIABETIC NEUROPATHY (HCC): ICD-10-CM

## 2021-03-19 DIAGNOSIS — M75.81 ROTATOR CUFF TENDONITIS, RIGHT: ICD-10-CM

## 2021-03-19 PROCEDURE — 3052F HG A1C>EQUAL 8.0%<EQUAL 9.0%: CPT | Performed by: INTERNAL MEDICINE

## 2021-03-19 PROCEDURE — 99213 OFFICE O/P EST LOW 20 MIN: CPT | Performed by: INTERNAL MEDICINE

## 2021-03-19 PROCEDURE — 2022F DILAT RTA XM EVC RTNOPTHY: CPT | Performed by: INTERNAL MEDICINE

## 2021-03-19 PROCEDURE — G8427 DOCREV CUR MEDS BY ELIG CLIN: HCPCS | Performed by: INTERNAL MEDICINE

## 2021-03-19 PROCEDURE — 3017F COLORECTAL CA SCREEN DOC REV: CPT | Performed by: INTERNAL MEDICINE

## 2021-03-19 RX ORDER — QUETIAPINE FUMARATE 25 MG/1
25-50 TABLET, FILM COATED ORAL NIGHTLY
Qty: 60 TABLET | Refills: 1 | Status: ON HOLD | OUTPATIENT
Start: 2021-03-19 | End: 2021-04-03 | Stop reason: HOSPADM

## 2021-03-19 RX ORDER — RIZATRIPTAN BENZOATE 10 MG/1
10 TABLET ORAL
Qty: 9 TABLET | Refills: 0 | Status: SHIPPED | OUTPATIENT
Start: 2021-03-19 | End: 2021-04-16 | Stop reason: SDUPTHER

## 2021-03-19 RX ORDER — OXYCODONE AND ACETAMINOPHEN 7.5; 325 MG/1; MG/1
1 TABLET ORAL EVERY 6 HOURS PRN
Qty: 84 TABLET | Refills: 0 | Status: SHIPPED | OUTPATIENT
Start: 2021-03-19 | End: 2021-04-16 | Stop reason: SDUPTHER

## 2021-03-19 RX ORDER — ERENUMAB-AOOE 70 MG/ML
INJECTION SUBCUTANEOUS
Qty: 1 ML | Refills: 1 | Status: SHIPPED | OUTPATIENT
Start: 2021-03-19 | End: 2021-04-16 | Stop reason: SDUPTHER

## 2021-03-19 RX ORDER — DULOXETIN HYDROCHLORIDE 60 MG/1
60 CAPSULE, DELAYED RELEASE ORAL DAILY
Qty: 30 CAPSULE | Refills: 1 | Status: SHIPPED | OUTPATIENT
Start: 2021-03-19 | End: 2021-05-14 | Stop reason: SDUPTHER

## 2021-03-19 NOTE — PROGRESS NOTES
describes it as sharp, dull, aching, squeezing, throbbing. Pain is made worse by: movement. Current treatment regimen has helped relieve about 10% of the pain. She denies side effects from the current pain regimen. Patient reports that since the last follow up visit the physical functioning is unchanged, family/social relationships are unchanged, mood is unchanged and sleep patterns are unchanged, and that the overall functioning is unchanged. Patient denies neurological bowel or bladder. Patient denies misusing/abusing her narcotic pain medications or using any illegal drugs. There are No indicators for possible drug abuse, addiction or diversion problems. Upon obtaining the medical history from Ms. Sandy Glasgow regarding today's office visit for her presenting problems, patient states she is having some muscle tightness and headaches, she has had a few days with increase headaches. She states she has been compliant with her regimen. Ms. Sandy Glasgow states she has been using Aimovig still and Percocet 3 per day. Patient denies any constipation symptoms. She reports she is managing light house chores but has a aide helping her. Patient states she has been in the emergency room a few times because of chest pain issues. ALLERGIES: Patients list of allergies were reviewed     MEDICATIONS: Ms. Sandy Glasgow list of medications were reviewed. Her current medications are   Outpatient Medications Prior to Visit   Medication Sig Dispense Refill    insulin aspart (NOVOLOG FLEXPEN) 100 UNIT/ML injection pen Inject 3 Units into the skin 3 times daily (before meals)      torsemide (DEMADEX) 10 MG tablet Take 10 mg by mouth daily      tiZANidine (ZANAFLEX) 4 MG tablet Take 4 mg by mouth daily as needed      divalproex (DEPAKOTE ER) 250 MG extended release tablet Take 250 mg by mouth 3 times daily      omeprazole (PRILOSEC) 20 MG delayed release capsule TAKE 1 CAPSULE BY MOUTH DAILY 30 capsule 1    insulin glargine (BASAGLAR KWIKPEN) 100 UNIT/ML injection pen Inject 8 Units into the skin nightly 5 pen 3    DULoxetine (CYMBALTA) 60 MG extended release capsule TAKE 1 CAPSULE BY MOUTH DAILY 30 capsule 1    oxyCODONE-acetaminophen (PERCOCET) 7.5-325 MG per tablet Take 1 tablet by mouth every 6 hours as needed for Pain (max 3-4 day) for up to 28 days. 84 tablet 0    metoprolol succinate (TOPROL XL) 50 MG extended release tablet TAKE 0.5 TABLETS BY MOUTH 2 TIMES DAILY (WITH MEALS) 30 tablet 5    AIMOVIG 70 MG/ML SOAJ INJECT 140 MLS INTO THE SKIN EVERY 30 DAYS 1 mL 1    QUEtiapine (SEROQUEL) 25 MG tablet TAKE 1-2 TABLETS BY MOUTH NIGHTLY 60 tablet 1    aspirin 81 MG chewable tablet Take 1 tablet by mouth daily 30 tablet 3    dicyclomine (BENTYL) 20 MG tablet Take 20 mg by mouth 4 times daily (before meals and nightly)       nitroGLYCERIN (NITROSTAT) 0.4 MG SL tablet Place 1 tablet under the tongue every 5 minutes as needed for Chest pain up to max of 3 total doses. If no relief after 1 dose, call 911. 25 tablet 3    Nutritional Supplements (ENSURE) LIQD Take 1 Can by mouth 3 times daily as needed (nutrition) 90 Can 5    hydrALAZINE (APRESOLINE) 50 MG tablet Take 1 tablet by mouth 2 times daily 60 tablet 5    atorvastatin (LIPITOR) 80 MG tablet Take 1 tablet by mouth nightly 90 tablet 5    amLODIPine (NORVASC) 5 MG tablet Take 1 tablet by mouth 2 times daily 180 tablet 5    lidocaine (XYLOCAINE) 5 % ointment Apply topically as needed.  5 g 3    blood glucose test strips (TRUE METRIX BLOOD GLUCOSE TEST) strip 1 each by Does not apply route 2 times daily 100 each 5    ondansetron (ZOFRAN) 4 MG tablet Take 1 tablet by mouth 3 times daily as needed for Nausea or Vomiting 30 tablet 0    clopidogrel (PLAVIX) 75 MG tablet TAKE 1 TABLET BY MOUTH DA JULIEN 90 tablet 5    albuterol (PROVENTIL) (2.5 MG/3ML) 0.083% nebulizer solution Take 3 mLs by nebulization every 4 hours as needed for Wheezing 120 each 5    budesonide-formoterol (SYMBICORT) 160-4.5 MCG/ACT AERO Inhale 2 puffs into the lungs daily 2 Inhaler 5    albuterol sulfate HFA (VENTOLIN HFA) 108 (90 Base) MCG/ACT inhaler Inhale 2 puffs into the lungs every 4 hours as needed for Wheezing or Shortness of Breath 3 Inhaler 5    ranolazine (RANEXA) 1000 MG extended release tablet Take 1 tablet by mouth 2 times daily 60 tablet 8    rizatriptan (MAXALT) 10 MG tablet Take 1 tablet by mouth once as needed for Migraine May repeat in 2 hours if needed 9 tablet 0     No facility-administered medications prior to visit. REVIEW OF SYSTEMS:    Respiratory: Negative for apnea, chest tightness and shortness of breath or change in baseline breathing. PHYSICAL EXAM:   Nursing note and vitals reviewed. There were no vitals taken for this visit. Constitutional: She appears well-developed and well-nourished. No acute distress. Cardiovascular: Normal rate, regular rhythm, normal heart sounds, and does not have murmur. Pulmonary/Chest: Effort normal. No respiratory distress. She does not have wheezes in the lung fields. She has no rales. Neurological/Psychiatric:She is alert and oriented to person, place, and time. Coordination is  normal.  Her mood isAppropriate and affect is Flat/blunted . Her    IMPRESSION:   1. Chronic pain syndrome    2. Fibromyalgia    3. DDD (degenerative disc disease), lumbar    4. Chronic painful diabetic neuropathy ([facility] [address]    5. DDD (degenerative disc disease), cervical    6. Rotator cuff tendonitis, right    7. Tendinitis of right rotator cuff        PLAN:  Informed verbal consent was obtained  -continue with current opioid regimen Percocet 3 per day  -she was advised  to avoid using too many OTC analgesics to control the headaches, avoid chocolates, increased caffeine, cheeses and MSG nitrite containing foods, cigarette smoking.  To avoid bright lights, strong smells and skipping meals.   -Interm history reviewed    -records from Cardiology reviewed  -No new pertinent information affecting  the care plan for today's visit was obtained from these records      Current Outpatient Medications   Medication Sig Dispense Refill    insulin aspart (NOVOLOG FLEXPEN) 100 UNIT/ML injection pen Inject 3 Units into the skin 3 times daily (before meals)      torsemide (DEMADEX) 10 MG tablet Take 10 mg by mouth daily      tiZANidine (ZANAFLEX) 4 MG tablet Take 4 mg by mouth daily as needed      divalproex (DEPAKOTE ER) 250 MG extended release tablet Take 250 mg by mouth 3 times daily      omeprazole (PRILOSEC) 20 MG delayed release capsule TAKE 1 CAPSULE BY MOUTH DAILY 30 capsule 1    insulin glargine (BASAGLAR KWIKPEN) 100 UNIT/ML injection pen Inject 8 Units into the skin nightly 5 pen 3    DULoxetine (CYMBALTA) 60 MG extended release capsule TAKE 1 CAPSULE BY MOUTH DAILY 30 capsule 1    oxyCODONE-acetaminophen (PERCOCET) 7.5-325 MG per tablet Take 1 tablet by mouth every 6 hours as needed for Pain (max 3-4 day) for up to 28 days. 84 tablet 0    metoprolol succinate (TOPROL XL) 50 MG extended release tablet TAKE 0.5 TABLETS BY MOUTH 2 TIMES DAILY (WITH MEALS) 30 tablet 5    AIMOVIG 70 MG/ML SOAJ INJECT 140 MLS INTO THE SKIN EVERY 30 DAYS 1 mL 1    QUEtiapine (SEROQUEL) 25 MG tablet TAKE 1-2 TABLETS BY MOUTH NIGHTLY 60 tablet 1    aspirin 81 MG chewable tablet Take 1 tablet by mouth daily 30 tablet 3    dicyclomine (BENTYL) 20 MG tablet Take 20 mg by mouth 4 times daily (before meals and nightly)       nitroGLYCERIN (NITROSTAT) 0.4 MG SL tablet Place 1 tablet under the tongue every 5 minutes as needed for Chest pain up to max of 3 total doses.  If no relief after 1 dose, call 911. 25 tablet 3    Nutritional Supplements (ENSURE) LIQD Take 1 Can by mouth 3 times daily as needed (nutrition) 90 Can 5    hydrALAZINE (APRESOLINE) 50 MG tablet Take 1 tablet by mouth 2 times daily 60 tablet 5    atorvastatin (LIPITOR) 80 MG tablet Take 1 tablet by mouth nightly 90 tablet 5  amLODIPine (NORVASC) 5 MG tablet Take 1 tablet by mouth 2 times daily 180 tablet 5    lidocaine (XYLOCAINE) 5 % ointment Apply topically as needed. 5 g 3    blood glucose test strips (TRUE METRIX BLOOD GLUCOSE TEST) strip 1 each by Does not apply route 2 times daily 100 each 5    ondansetron (ZOFRAN) 4 MG tablet Take 1 tablet by mouth 3 times daily as needed for Nausea or Vomiting 30 tablet 0    clopidogrel (PLAVIX) 75 MG tablet TAKE 1 TABLET BY MOUTH DA JULIEN 90 tablet 5    albuterol (PROVENTIL) (2.5 MG/3ML) 0.083% nebulizer solution Take 3 mLs by nebulization every 4 hours as needed for Wheezing 120 each 5    budesonide-formoterol (SYMBICORT) 160-4.5 MCG/ACT AERO Inhale 2 puffs into the lungs daily 2 Inhaler 5    albuterol sulfate HFA (VENTOLIN HFA) 108 (90 Base) MCG/ACT inhaler Inhale 2 puffs into the lungs every 4 hours as needed for Wheezing or Shortness of Breath 3 Inhaler 5    ranolazine (RANEXA) 1000 MG extended release tablet Take 1 tablet by mouth 2 times daily 60 tablet 8    rizatriptan (MAXALT) 10 MG tablet Take 1 tablet by mouth once as needed for Migraine May repeat in 2 hours if needed 9 tablet 0     No current facility-administered medications for this visit. I will continue her current medication regimen  which is part of the above treatment schedule. It has been helping with Ms. Meza's chronic  medical problems which for this visit include:   Diagnoses of Chronic pain syndrome, Fibromyalgia, Chronic migraine, DDD (degenerative disc disease), lumbar, Chronic painful diabetic neuropathy ([address] DDD (degenerative disc disease), cervical, Primary insomnia, Rotator cuff tendonitis, right, Tendinitis of right rotator cuff, Intractable migraine with aura without status migrainosus, and Intractable migraine with aura with status migrainosus were pertinent to this visit.    Risks and benefits of the medications and other alternative treatments  including no treatment were discussed with the patient. The common side effects of these medications were also explained to the patient. Informed verbal consent was obtained. Goals of current treatment regimen include improvement in pain, restoration of functioning- with focus on improvement in physical performance, general activity, work or disability,emotional distress, health care utilization and  decreased medication consumption. Will continue to monitor progress towards achieving/maintaining therapeutic goals with special emphasis on  1. Improvement in perceived interfernce  of pain with ADL's. Ability to do home exercises independently. Ability to do household chores indoor and/or outdoor work and social and leisure activities. Improve psychosocial and physical functioning. - she is showing progression towards this treatment goal with the current regimen. She was advised against drinking alcohol with the narcotic pain medicines, advised against driving or handling machinery while adjusting the dose of medicines or if having cognitive  issues related to the current medications. Risk of overdose and death, if medicines not taken as prescribed, were also discussed. If the patient develops new symptoms or if the symptoms worsen, the patient should call the office. While transcribing every attempt was made to maintain the accuracy of the note in terms of it's contents,there may have been some errors made inadvertently. Thank you for allowing me to participate in the care of this patient.     Solitario Calvert MD.    Cc: Jessie Aguayo MD

## 2021-03-22 NOTE — TELEPHONE ENCOUNTER
Rea Daugherty from [address] [place] [address] called in stating she never got Maren's cardiac clearance and has asked if we send it to a different fax number. Fax # [phone]  You can reach Rea Daugherty at [phone] ext.  [facility]

## 2021-03-22 NOTE — TELEPHONE ENCOUNTER
Printed and re-faxed to new fax number. Will have re-scanned into media with new fax confirmation. Thanks.

## 2021-03-24 ENCOUNTER — OFFICE VISIT (OUTPATIENT)
Dept: CARDIOLOGY CLINIC | Age: 66
End: 2021-03-24
Payer: COMMERCIAL

## 2021-03-24 VITALS
SYSTOLIC BLOOD PRESSURE: 136 MMHG | BODY MASS INDEX: 23.36 KG/M2 | TEMPERATURE: 97.4 F | HEART RATE: 70 BPM | HEIGHT: 60 IN | DIASTOLIC BLOOD PRESSURE: 74 MMHG | WEIGHT: 119 LBS

## 2021-03-24 DIAGNOSIS — R07.9 ACUTE CHEST PAIN: Primary | ICD-10-CM

## 2021-03-24 PROCEDURE — 93000 ELECTROCARDIOGRAM COMPLETE: CPT | Performed by: INTERNAL MEDICINE

## 2021-03-24 PROCEDURE — G8484 FLU IMMUNIZE NO ADMIN: HCPCS | Performed by: INTERNAL MEDICINE

## 2021-03-24 PROCEDURE — 99215 OFFICE O/P EST HI 40 MIN: CPT | Performed by: INTERNAL MEDICINE

## 2021-03-24 PROCEDURE — G8427 DOCREV CUR MEDS BY ELIG CLIN: HCPCS | Performed by: INTERNAL MEDICINE

## 2021-03-24 PROCEDURE — G8420 CALC BMI NORM PARAMETERS: HCPCS | Performed by: INTERNAL MEDICINE

## 2021-03-24 RX ORDER — EVOLOCUMAB 140 MG/ML
140 INJECTION, SOLUTION SUBCUTANEOUS
Qty: 2 PEN | Refills: 3 | Status: SHIPPED | OUTPATIENT
Start: 2021-03-24 | End: 2021-03-29 | Stop reason: CLARIF

## 2021-03-24 NOTE — PROGRESS NOTES
[facility] [place] [id number]     Outpatient Cardiology Consult  Consulting Cardiologist Adi Bermudez M.D., [facility]  Referring Provider:  Bud Paula MD    3/24/2021,12:13 PM    Chief Complaint   Patient presents with    New Patient           Asked by No admitting provider for patient encounter. Bud Paula MD  to evaluate and assess this patient's coronary disease    History of Present Illness: Luis Antonio Dasilva is a 60 y.o. female is here for a second opinion regarding her current cardiac disease and coronary status. She has known disease and has undergone several coronary revascularization procedures with balloons and stents including the proximal aspect of the LAD and the circumflex and the proximal right coronary arteries. She is here with her daughter and have concerns that she might need some other intervention. I did review her last angiogram report and I do see that the coronary arteries actually look fairly good with good flow and that the stents seem to be holding up very nicely and this was some December 2020. She currently has chest pains occasionally. It occurs at rest as well as with minimal activity. She did have a CT angiogram that was done in 2021 that did was not very revealing and that there was a \"bloom\" at the areas of the previous stents and so the internal dimensions would not be able to be seen. This patient continues to smoke. Her last LDL was 89 on current therapy which includes Lipitor 80 mg/day. Her weight has remained relatively stable. She did have both of her Covid vaccines Moderna. She does have CKD level 3    She indicates that at one time she was taken by surgery to the surgical suite for bypass but was terminated because of blockage. I have not found the substantiation of that episode but will look for additional documentation.      CAD coronary stents most recently December 2020  CKD 1.4  Continues to smoke  Hypertension  Diabetes mellitus on insulin  Past Medical History:   has a past medical history of Acid reflux, Anemia, Anxiety, Arthritis, Asthma, Atrial fibrillation ([address] Blood transfusion reaction, CAD (coronary artery disease), Cerebral artery occlusion with cerebral infarction ([address] CHF (congestive heart failure) ([address] Chronic kidney disease, COPD (chronic obstructive pulmonary disease) ([address] Depression, DM2 (diabetes mellitus, type 2) ([address] Dysarthria, Fibromyalgia, Headache(784.0), Hemisensory loss, Hyperlipidemia, Hypertension, IBS (irritable bowel syndrome), Inferior vena cava occlusion ([address] Irritable bowel syndrome, Keratitis, MI, old, Neuropathy, Superior vena cava obstruction, and Temporal arteritis ([address] Surgical History:   has a past surgical history that includes knee surgery; Tunneled venous port placement; Coronary angioplasty with stent; Cholecystectomy; ablation of dysrhythmic focus; Cataract removal (Bilateral); joint replacement (Right); Hysterectomy; Artery Biopsy (Right, 04/23/2014); Coronary angioplasty with stent; Knee arthroscopy (Right); vascular surgery; Percutaneous Transluminal Coronary Angio (10/2019); and Upper gastrointestinal endoscopy (N/A, 7/6/2020). Social History:   reports that she quit smoking about 2 years ago. Her smoking use included cigarettes. She has a 17.50 pack-year smoking history. She has quit using smokeless tobacco. She reports that she does not drink alcohol or use drugs. Family History:  family history includes Cancer in her mother; Diabetes in her mother; High Cholesterol in her mother; Hypertension in her mother; No Known Problems in her paternal grandfather; Stroke in her mother. Home Medications:  Prior to Admission medications    Medication Sig Start Date End Date Taking?  Authorizing Provider   DULoxetine (CYMBALTA) 60 MG extended release capsule Take 1 capsule by mouth daily 3/19/21  Yes Solitario Calvert MD   oxyCODONE-acetaminophen (PERCOCET) 7.5-325 MG per tablet Take 1 tablet by mouth every 6 hours as needed for Pain (max 3-4 day) for up to 28 days. 3/19/21 4/16/21 Yes Jamshid Strauss MD   Erenumab-aooe (AIMOVIG) 70 MG/ML SOAJ INJECT 140 MLS INTO THE SKIN EVERY 30 DAYS 3/19/21  Yes Jamshid Strauss MD   QUEtiapine (SEROQUEL) 25 MG tablet Take 1-2 tablets by mouth nightly 3/19/21  Yes Jamshid Strauss MD   insulin aspart (NOVOLOG FLEXPEN) 100 UNIT/ML injection pen Inject 3 Units into the skin 3 times daily (before meals)   Yes Historical Provider, MD   torsemide (DEMADEX) 10 MG tablet Take 10 mg by mouth daily   Yes Historical Provider, MD   tiZANidine (ZANAFLEX) 4 MG tablet Take 4 mg by mouth daily as needed   Yes Historical Provider, MD   divalproex (DEPAKOTE ER) 250 MG extended release tablet Take 250 mg by mouth 3 times daily   Yes Historical Provider, MD   omeprazole (PRILOSEC) 20 MG delayed release capsule TAKE 1 CAPSULE BY MOUTH DAILY 3/5/21  Yes Roel Limon MD   insulin glargine (BASAGLAR KWIKPEN) 100 UNIT/ML injection pen Inject 8 Units into the skin nightly 3/5/21  Yes Julio Giles MD   metoprolol succinate (TOPROL XL) 50 MG extended release tablet TAKE 0.5 TABLETS BY MOUTH 2 TIMES DAILY (WITH MEALS) 2/5/21  Yes CYRUS Crowell   aspirin 81 MG chewable tablet Take 1 tablet by mouth daily 12/24/20  Yes Jeramy Grover MD   dicyclomine (BENTYL) 20 MG tablet Take 20 mg by mouth 4 times daily (before meals and nightly)  12/9/20  Yes Historical Provider, MD   nitroGLYCERIN (NITROSTAT) 0.4 MG SL tablet Place 1 tablet under the tongue every 5 minutes as needed for Chest pain up to max of 3 total doses.  If no relief after 1 dose, call 911. 12/16/20  Yes Roel Limon MD   Nutritional Supplements (ENSURE) LIQD Take 1 Can by mouth 3 times daily as needed (nutrition) 12/16/20  Yes Roel Limon MD   hydrALAZINE (APRESOLINE) 50 MG tablet Take 1 tablet by mouth 2 times daily 10/1/20  Yes Roel Limon MD   atorvastatin (LIPITOR) 80 MG tablet Take 1 tablet by mouth nightly 10/1/20  Yes Alethea Canchola MD   amLODIPine (NORVASC) 5 MG tablet Take 1 tablet by mouth 2 times daily 10/1/20  Yes Alethea Canchola MD   lidocaine (XYLOCAINE) 5 % ointment Apply topically as needed. 10/1/20  Yes Alethea Canchola MD   blood glucose test strips (TRUE METRIX BLOOD GLUCOSE TEST) strip 1 each by Does not apply route 2 times daily 9/25/20  Yes Alethea Canchola MD   ondansetron (ZOFRAN) 4 MG tablet Take 1 tablet by mouth 3 times daily as needed for Nausea or Vomiting 9/23/20  Yes Alethea Canchola MD   clopidogrel (PLAVIX) 75 MG tablet TAKE 1 TABLET BY MOUTH DA JULIEN 9/17/20  Yes Alethea Canchola MD   albuterol (PROVENTIL) (2.5 MG/3ML) 0.083% nebulizer solution Take 3 mLs by nebulization every 4 hours as needed for Wheezing 3/25/20  Yes Alethea Canchola MD   budesonide-formoterol (SYMBICORT) 160-4.5 MCG/ACT AERO Inhale 2 puffs into the lungs daily 12/6/19  Yes Alethea Canchola MD   albuterol sulfate HFA (VENTOLIN HFA) 108 (90 Base) MCG/ACT inhaler Inhale 2 puffs into the lungs every 4 hours as needed for Wheezing or Shortness of Breath 12/6/19  Yes Alethea Canchola MD   ranolazine (RANEXA) 1000 MG extended release tablet Take 1 tablet by mouth 2 times daily 9/10/19  Yes Alethea Canchola MD   rizatriptan (MAXALT) 10 MG tablet Take 1 tablet by mouth once as needed for Migraine May repeat in 2 hours if needed 3/19/21 3/19/21  Giovana Quiñones MD           Allergies:  Patient has no known allergies. Review of Systems:   · Constitutional: there has been no unanticipated weight loss. · Eyes: No visual changes or diplopia. No scleral icterus. · ENT: No Headaches, hearing loss or vertigo. No mouth sores or sore throat. · Cardiovascular: Reviewed in HPI  ·  Pulmonary:  no cough or sputum production. · Gastrointestinal: No abdominal pain, appetite loss, blood in stools. No change in bowel or bladder habits.   · Genitourinary: No dysuria, trouble voiding, or hematuria. · Musculoskeletal:  No gait disturbance, weakness or joint complaints. · Integumentary: No rash or pruritis. Endocrine: No malaise, fatigue or temperature intolerance. Hematologic/Lymphatic: No abnormal bruising or bleeding, blood clots or swollen lymph nodes. · Allergic/Immunologic: No nasal congestion or hives. · All other ROS are reviewed and are unremarkable. Physical Examination:      Wt Readings from Last 3 Encounters:   03/24/21 119 lb (54 kg)   03/11/21 120 lb 13 oz (54.8 kg)   03/05/21 120 lb (54.4 kg)       BP Readings from Last 3 Encounters:   03/24/21 136/74   03/11/21 122/74   03/05/21 137/62       Pulse Readings from Last 3 Encounters:   03/24/21 70   03/11/21 61   03/05/21 67       Constitutional and General Appearance:  Healthy. And alert . HEENT: eyes and ears intact. No nasal masses  THYROID: not enlarged  LUNGS:  · Clear to auscultation and percussion  HEART and VASCULAR:  · The apical impulses not displaced  · Heart tones are crisp and normal  · Cervical veins are not engorged  · The carotid upstroke is normal in amplitude and contour without delay or bruit  · Peripheral pulses are symmetrical and full  · There is no clubbing, cyanosis of the extremities. · No peripheral edema  · Femoral Arteries: 2+ and equal  · Pedal Pulses: 2+ and equal   ABDOMEN[de-identified]  · No masses or tenderness  · Liver/Spleen: No Abnormalities Noted  NEUROLOGICAL:  . Moves all extremities to command. Cranial nerves 2-12 are in tact.   PSYCHIATRIC: alert and lucid  and oriented and appropriate  SKIN: No lesions or rashes  LYMPH NODES: none enlarged    ·   ·   ·     CBC with Differential:    Lab Results   Component Value Date    WBC 4.3 03/11/2021    RBC 2.78 03/11/2021    HGB 9.1 03/11/2021    HCT 27.3 03/11/2021     03/11/2021    MCV 98.2 03/11/2021    MCH 32.9 03/11/2021    MCHC 33.5 03/11/2021    RDW 15.2 03/11/2021    SEGSPCT 66.2 05/23/2013    BANDSPCT 1.0 07/15/2011    LYMPHOPCT 22.5 03/09/2021    MONOPCT 4.8 03/09/2021    EOSPCT 2.9 04/17/2012    BASOPCT 0.4 03/09/2021    MONOSABS 0.2 03/09/2021    LYMPHSABS 1.1 03/09/2021    EOSABS 0.1 03/09/2021    BASOSABS 0.0 03/09/2021    DIFFTYPE Auto 05/23/2013     BMP:    Lab Results   Component Value Date     03/09/2021    K 4.3 03/09/2021     03/09/2021    CO2 25 03/09/2021    BUN 16 03/09/2021    LABALBU 3.9 03/09/2021    CREATININE 1.3 03/09/2021    CALCIUM 9.7 03/09/2021    GFRAA 50 03/09/2021    GFRAA 60 05/23/2013    LABGLOM 41 03/09/2021     Uric Acid:  No components found for: URIC  PT/INR:    Lab Results   Component Value Date    PROTIME 11.8 03/01/2021    PROTIME 10.3 12/15/2009    INR 1.02 03/01/2021     Last 3 Troponin:    Lab Results   Component Value Date    TROPONINI <0.01 03/09/2021    TROPONINI <0.01 03/09/2021    TROPONINI <0.01 03/09/2021     FLP:    Lab Results   Component Value Date    TRIG 131 08/29/2019    HDL 70 08/29/2019    HDL 58 05/14/2012    LDLCALC 89 08/29/2019    LABVLDL 26 08/29/2019     Pharmacological Stress/MPI Results:3/11/21        1. Technically a satisfactory study.    2. No evidence of Ischemia by Myocardial Perfusion Imaging.    3. Gated Study shows normal LV size and Systolic function; EF is 70 %.             EKG: On 3/24/2021 sinus rhythm 70/min. One anterior septal myocardial infarction. Nonspecific ST and T wave changes. echocardiogramSummary 6/18/18 Left ventricular cavity size is normal.   Normal left ventricular wall thickness. Ejection fraction is visually estimated to be 55-60%. Normal right ventricular size and function. Trivial valvular disease. This patient was educated using the patient point room wall Gema Touch device. Absence from smokers and smoking and diet and exercising are important. Assessment/ Plan     CAD with recurring coronary ischemic symptoms. Several stents into the proximal vessels.     Recommend the following   #1 increase metoprolol to 37.5 mg twice daily. #2 stop smoking  #3 optimize lipids better and Praluent be bimonthly  #4 stop smoking  #5 return in 6 weeks. She will follow-up with Dr. Nazia Baptiste but if she comes back we will consider cardiac catheterization to determine her current status. Thanks for allowing us the opportunity  to participate in the evaluation and care of your patients.  Please call if we may assist further [phone]    This note was likely completed using voice recognition technology and may contain unintended errors  Dagoberto Cervantes M.D., [facility]  3/24/551125:13 PM

## 2021-03-26 ENCOUNTER — TELEPHONE (OUTPATIENT)
Dept: CARDIOLOGY CLINIC | Age: 66
End: 2021-03-26

## 2021-03-29 RX ORDER — ALIROCUMAB 75 MG/ML
75 INJECTION, SOLUTION SUBCUTANEOUS
Qty: 6 PEN | Refills: 3 | Status: SHIPPED | OUTPATIENT
Start: 2021-03-29 | End: 2022-04-21

## 2021-03-30 ENCOUNTER — TELEPHONE (OUTPATIENT)
Dept: PRIMARY CARE CLINIC | Age: 66
End: 2021-03-30

## 2021-03-30 NOTE — TELEPHONE ENCOUNTER
[facility] received a call from:    Name of Caller: Kim Billing    Relationship to patient:other    Organization name: [address] [place] [address] contact number: [phone]    Reason for call: Did you receive the fax that was sent on 3/25/2021 requesting diabetic supplies for this patient? Please advise.

## 2021-03-30 NOTE — TELEPHONE ENCOUNTER
Called and spoke with Mozell Sicard and advised her we are having fax issues and will get the form out to her ASAP, she will call back with an email or alternative fax

## 2021-04-01 ENCOUNTER — TELEPHONE (OUTPATIENT)
Dept: CARDIOLOGY CLINIC | Age: 66
End: 2021-04-01

## 2021-04-01 ENCOUNTER — HOSPITAL ENCOUNTER (INPATIENT)
Age: 66
LOS: 2 days | Discharge: HOME | DRG: 683 | End: 2021-04-03
Attending: INTERNAL MEDICINE | Admitting: INTERNAL MEDICINE
Payer: COMMERCIAL

## 2021-04-01 ENCOUNTER — APPOINTMENT (OUTPATIENT)
Dept: GENERAL RADIOLOGY | Age: 66
DRG: 683 | End: 2021-04-01
Payer: COMMERCIAL

## 2021-04-01 DIAGNOSIS — R07.9 CHEST PAIN, UNSPECIFIED TYPE: Primary | ICD-10-CM

## 2021-04-01 DIAGNOSIS — N17.9 ACUTE RENAL FAILURE, UNSPECIFIED ACUTE RENAL FAILURE TYPE (HCC): ICD-10-CM

## 2021-04-01 LAB
A/G RATIO: 1.1 (ref 1.1–2.2)
ALBUMIN SERPL-MCNC: 4.8 G/DL (ref 3.4–5)
ALP BLD-CCNC: 150 U/L (ref 40–129)
ALT SERPL-CCNC: 36 U/L (ref 10–40)
ANION GAP SERPL CALCULATED.3IONS-SCNC: 17 MMOL/L (ref 3–16)
APTT: 35.4 SEC (ref 24.2–36.2)
AST SERPL-CCNC: 45 U/L (ref 15–37)
BASOPHILS ABSOLUTE: 0 K/UL (ref 0–0.2)
BASOPHILS RELATIVE PERCENT: 0.4 %
BILIRUB SERPL-MCNC: 0.3 MG/DL (ref 0–1)
BUN BLDV-MCNC: 32 MG/DL (ref 7–20)
CALCIUM SERPL-MCNC: 9.9 MG/DL (ref 8.3–10.6)
CHLORIDE BLD-SCNC: 104 MMOL/L (ref 99–110)
CO2: 20 MMOL/L (ref 21–32)
CREAT SERPL-MCNC: 1.8 MG/DL (ref 0.6–1.2)
D DIMER: 208 NG/ML DDU (ref 0–229)
EOSINOPHILS ABSOLUTE: 0.2 K/UL (ref 0–0.6)
EOSINOPHILS RELATIVE PERCENT: 3.4 %
GFR AFRICAN AMERICAN: 34
GFR NON-AFRICAN AMERICAN: 28
GLOBULIN: 4.5 G/DL
GLUCOSE BLD-MCNC: 124 MG/DL (ref 70–99)
GLUCOSE BLD-MCNC: 161 MG/DL (ref 70–99)
GLUCOSE BLD-MCNC: 75 MG/DL (ref 70–99)
HCT VFR BLD CALC: 34.3 % (ref 36–48)
HEMOGLOBIN: 11.3 G/DL (ref 12–16)
INR BLD: 1 (ref 0.86–1.14)
LYMPHOCYTES ABSOLUTE: 2.2 K/UL (ref 1–5.1)
LYMPHOCYTES RELATIVE PERCENT: 31.3 %
MCH RBC QN AUTO: 32.7 PG (ref 26–34)
MCHC RBC AUTO-ENTMCNC: 32.9 G/DL (ref 31–36)
MCV RBC AUTO: 99.4 FL (ref 80–100)
MONOCYTES ABSOLUTE: 0.4 K/UL (ref 0–1.3)
MONOCYTES RELATIVE PERCENT: 5.8 %
NEUTROPHILS ABSOLUTE: 4.2 K/UL (ref 1.7–7.7)
NEUTROPHILS RELATIVE PERCENT: 59.1 %
PDW BLD-RTO: 15.4 % (ref 12.4–15.4)
PERFORMED ON: ABNORMAL
PERFORMED ON: NORMAL
PLATELET # BLD: 230 K/UL (ref 135–450)
PMV BLD AUTO: 9.5 FL (ref 5–10.5)
POTASSIUM REFLEX MAGNESIUM: 4.6 MMOL/L (ref 3.5–5.1)
PROTHROMBIN TIME: 11.6 SEC (ref 10–13.2)
RBC # BLD: 3.45 M/UL (ref 4–5.2)
SODIUM BLD-SCNC: 141 MMOL/L (ref 136–145)
TOTAL PROTEIN: 9.3 G/DL (ref 6.4–8.2)
TROPONIN: <0.01 NG/ML
WBC # BLD: 7.1 K/UL (ref 4–11)

## 2021-04-01 PROCEDURE — 2580000003 HC RX 258: Performed by: GENERAL ACUTE CARE HOSPITAL

## 2021-04-01 PROCEDURE — 2580000003 HC RX 258: Performed by: INTERNAL MEDICINE

## 2021-04-01 PROCEDURE — 80053 COMPREHEN METABOLIC PANEL: CPT

## 2021-04-01 PROCEDURE — 85379 FIBRIN DEGRADATION QUANT: CPT

## 2021-04-01 PROCEDURE — 84484 ASSAY OF TROPONIN QUANT: CPT

## 2021-04-01 PROCEDURE — G0378 HOSPITAL OBSERVATION PER HR: HCPCS

## 2021-04-01 PROCEDURE — 96375 TX/PRO/DX INJ NEW DRUG ADDON: CPT

## 2021-04-01 PROCEDURE — 96361 HYDRATE IV INFUSION ADD-ON: CPT

## 2021-04-01 PROCEDURE — 85730 THROMBOPLASTIN TIME PARTIAL: CPT

## 2021-04-01 PROCEDURE — 94640 AIRWAY INHALATION TREATMENT: CPT

## 2021-04-01 PROCEDURE — 85025 COMPLETE CBC W/AUTO DIFF WBC: CPT

## 2021-04-01 PROCEDURE — 71045 X-RAY EXAM CHEST 1 VIEW: CPT

## 2021-04-01 PROCEDURE — 6360000002 HC RX W HCPCS: Performed by: GENERAL ACUTE CARE HOSPITAL

## 2021-04-01 PROCEDURE — 96374 THER/PROPH/DIAG INJ IV PUSH: CPT

## 2021-04-01 PROCEDURE — 99284 EMERGENCY DEPT VISIT MOD MDM: CPT

## 2021-04-01 PROCEDURE — 6370000000 HC RX 637 (ALT 250 FOR IP): Performed by: INTERNAL MEDICINE

## 2021-04-01 PROCEDURE — 1200000000 HC SEMI PRIVATE

## 2021-04-01 PROCEDURE — 36415 COLL VENOUS BLD VENIPUNCTURE: CPT

## 2021-04-01 PROCEDURE — 85610 PROTHROMBIN TIME: CPT

## 2021-04-01 PROCEDURE — 93005 ELECTROCARDIOGRAM TRACING: CPT | Performed by: STUDENT IN AN ORGANIZED HEALTH CARE EDUCATION/TRAINING PROGRAM

## 2021-04-01 PROCEDURE — 83036 HEMOGLOBIN GLYCOSYLATED A1C: CPT

## 2021-04-01 RX ORDER — TIZANIDINE 4 MG/1
4 TABLET ORAL DAILY PRN
Status: DISCONTINUED | OUTPATIENT
Start: 2021-04-01 | End: 2021-04-03 | Stop reason: HOSPADM

## 2021-04-01 RX ORDER — RANOLAZINE 500 MG/1
1000 TABLET, EXTENDED RELEASE ORAL 2 TIMES DAILY
Status: DISCONTINUED | OUTPATIENT
Start: 2021-04-01 | End: 2021-04-03 | Stop reason: HOSPADM

## 2021-04-01 RX ORDER — SODIUM CHLORIDE 9 MG/ML
25 INJECTION, SOLUTION INTRAVENOUS PRN
Status: DISCONTINUED | OUTPATIENT
Start: 2021-04-01 | End: 2021-04-03 | Stop reason: HOSPADM

## 2021-04-01 RX ORDER — BUDESONIDE AND FORMOTEROL FUMARATE DIHYDRATE 160; 4.5 UG/1; UG/1
2 AEROSOL RESPIRATORY (INHALATION) DAILY
Status: DISCONTINUED | OUTPATIENT
Start: 2021-04-02 | End: 2021-04-03 | Stop reason: HOSPADM

## 2021-04-01 RX ORDER — DEXTROSE MONOHYDRATE 50 MG/ML
100 INJECTION, SOLUTION INTRAVENOUS PRN
Status: DISCONTINUED | OUTPATIENT
Start: 2021-04-01 | End: 2021-04-03 | Stop reason: HOSPADM

## 2021-04-01 RX ORDER — SODIUM CHLORIDE 9 MG/ML
INJECTION, SOLUTION INTRAVENOUS CONTINUOUS
Status: DISCONTINUED | OUTPATIENT
Start: 2021-04-01 | End: 2021-04-02

## 2021-04-01 RX ORDER — POLYETHYLENE GLYCOL 3350 17 G/17G
17 POWDER, FOR SOLUTION ORAL DAILY PRN
Status: DISCONTINUED | OUTPATIENT
Start: 2021-04-01 | End: 2021-04-03 | Stop reason: HOSPADM

## 2021-04-01 RX ORDER — DICYCLOMINE HCL 20 MG
20 TABLET ORAL
Status: DISCONTINUED | OUTPATIENT
Start: 2021-04-01 | End: 2021-04-03 | Stop reason: HOSPADM

## 2021-04-01 RX ORDER — SODIUM CHLORIDE 0.9 % (FLUSH) 0.9 %
10 SYRINGE (ML) INJECTION PRN
Status: DISCONTINUED | OUTPATIENT
Start: 2021-04-01 | End: 2021-04-03 | Stop reason: HOSPADM

## 2021-04-01 RX ORDER — 0.9 % SODIUM CHLORIDE 0.9 %
500 INTRAVENOUS SOLUTION INTRAVENOUS ONCE
Status: COMPLETED | OUTPATIENT
Start: 2021-04-01 | End: 2021-04-01

## 2021-04-01 RX ORDER — METOPROLOL SUCCINATE 25 MG/1
25 TABLET, EXTENDED RELEASE ORAL 2 TIMES DAILY WITH MEALS
Status: DISCONTINUED | OUTPATIENT
Start: 2021-04-01 | End: 2021-04-03 | Stop reason: HOSPADM

## 2021-04-01 RX ORDER — ASPIRIN 81 MG/1
81 TABLET, CHEWABLE ORAL DAILY
Status: DISCONTINUED | OUTPATIENT
Start: 2021-04-02 | End: 2021-04-03 | Stop reason: HOSPADM

## 2021-04-01 RX ORDER — ALBUTEROL SULFATE 2.5 MG/3ML
2.5 SOLUTION RESPIRATORY (INHALATION) EVERY 4 HOURS PRN
Status: DISCONTINUED | OUTPATIENT
Start: 2021-04-01 | End: 2021-04-03 | Stop reason: HOSPADM

## 2021-04-01 RX ORDER — LACTOSE-REDUCED FOOD
1 LIQUID (ML) ORAL 3 TIMES DAILY PRN
Status: DISCONTINUED | OUTPATIENT
Start: 2021-04-01 | End: 2021-04-01

## 2021-04-01 RX ORDER — DEXTROSE MONOHYDRATE 25 G/50ML
12.5 INJECTION, SOLUTION INTRAVENOUS PRN
Status: DISCONTINUED | OUTPATIENT
Start: 2021-04-01 | End: 2021-04-03 | Stop reason: HOSPADM

## 2021-04-01 RX ORDER — CLOPIDOGREL BISULFATE 75 MG/1
75 TABLET ORAL DAILY
Status: DISCONTINUED | OUTPATIENT
Start: 2021-04-02 | End: 2021-04-03 | Stop reason: HOSPADM

## 2021-04-01 RX ORDER — DULOXETIN HYDROCHLORIDE 60 MG/1
60 CAPSULE, DELAYED RELEASE ORAL DAILY
Status: DISCONTINUED | OUTPATIENT
Start: 2021-04-02 | End: 2021-04-03 | Stop reason: HOSPADM

## 2021-04-01 RX ORDER — ACETAMINOPHEN 650 MG/1
650 SUPPOSITORY RECTAL EVERY 6 HOURS PRN
Status: DISCONTINUED | OUTPATIENT
Start: 2021-04-01 | End: 2021-04-03 | Stop reason: HOSPADM

## 2021-04-01 RX ORDER — ONDANSETRON 4 MG/1
4 TABLET, FILM COATED ORAL 3 TIMES DAILY PRN
Status: DISCONTINUED | OUTPATIENT
Start: 2021-04-01 | End: 2021-04-03 | Stop reason: HOSPADM

## 2021-04-01 RX ORDER — QUETIAPINE FUMARATE 25 MG/1
25 TABLET, FILM COATED ORAL NIGHTLY
Status: DISCONTINUED | OUTPATIENT
Start: 2021-04-01 | End: 2021-04-03 | Stop reason: HOSPADM

## 2021-04-01 RX ORDER — ALBUTEROL SULFATE 2.5 MG/3ML
2.5 SOLUTION RESPIRATORY (INHALATION) EVERY 6 HOURS PRN
Status: DISCONTINUED | OUTPATIENT
Start: 2021-04-01 | End: 2021-04-01 | Stop reason: ALTCHOICE

## 2021-04-01 RX ORDER — MORPHINE SULFATE 4 MG/ML
4 INJECTION, SOLUTION INTRAMUSCULAR; INTRAVENOUS ONCE
Status: COMPLETED | OUTPATIENT
Start: 2021-04-01 | End: 2021-04-01

## 2021-04-01 RX ORDER — NICOTINE POLACRILEX 4 MG
15 LOZENGE BUCCAL PRN
Status: DISCONTINUED | OUTPATIENT
Start: 2021-04-01 | End: 2021-04-03 | Stop reason: HOSPADM

## 2021-04-01 RX ORDER — NITROGLYCERIN 0.4 MG/1
0.4 TABLET SUBLINGUAL EVERY 5 MIN PRN
Status: DISCONTINUED | OUTPATIENT
Start: 2021-04-01 | End: 2021-04-03 | Stop reason: HOSPADM

## 2021-04-01 RX ORDER — ATORVASTATIN CALCIUM 80 MG/1
80 TABLET, FILM COATED ORAL NIGHTLY
Status: DISCONTINUED | OUTPATIENT
Start: 2021-04-01 | End: 2021-04-03 | Stop reason: HOSPADM

## 2021-04-01 RX ORDER — METOPROLOL SUCCINATE 50 MG/1
50 TABLET, EXTENDED RELEASE ORAL NIGHTLY
COMMUNITY
End: 2021-04-06

## 2021-04-01 RX ORDER — INSULIN GLARGINE 100 [IU]/ML
8 INJECTION, SOLUTION SUBCUTANEOUS NIGHTLY
Status: DISCONTINUED | OUTPATIENT
Start: 2021-04-01 | End: 2021-04-03 | Stop reason: HOSPADM

## 2021-04-01 RX ORDER — OXYCODONE AND ACETAMINOPHEN 7.5; 325 MG/1; MG/1
1 TABLET ORAL EVERY 6 HOURS PRN
Status: DISCONTINUED | OUTPATIENT
Start: 2021-04-01 | End: 2021-04-03 | Stop reason: HOSPADM

## 2021-04-01 RX ORDER — DIVALPROEX SODIUM 250 MG/1
250 TABLET, EXTENDED RELEASE ORAL 3 TIMES DAILY
Status: DISCONTINUED | OUTPATIENT
Start: 2021-04-01 | End: 2021-04-03 | Stop reason: HOSPADM

## 2021-04-01 RX ORDER — PANTOPRAZOLE SODIUM 40 MG/1
40 TABLET, DELAYED RELEASE ORAL
Status: DISCONTINUED | OUTPATIENT
Start: 2021-04-02 | End: 2021-04-03 | Stop reason: HOSPADM

## 2021-04-01 RX ORDER — ONDANSETRON 2 MG/ML
4 INJECTION INTRAMUSCULAR; INTRAVENOUS EVERY 6 HOURS PRN
Status: DISCONTINUED | OUTPATIENT
Start: 2021-04-01 | End: 2021-04-03 | Stop reason: HOSPADM

## 2021-04-01 RX ORDER — NITROGLYCERIN 0.4 MG/1
0.4 TABLET SUBLINGUAL EVERY 5 MIN PRN
Status: CANCELLED | OUTPATIENT
Start: 2021-04-01

## 2021-04-01 RX ORDER — TORSEMIDE 20 MG/1
10 TABLET ORAL DAILY
Status: DISCONTINUED | OUTPATIENT
Start: 2021-04-01 | End: 2021-04-03 | Stop reason: HOSPADM

## 2021-04-01 RX ORDER — OLANZAPINE 5 MG/1
5 TABLET ORAL NIGHTLY
Status: ON HOLD | COMMUNITY
Start: 2021-03-26 | End: 2021-04-03 | Stop reason: HOSPADM

## 2021-04-01 RX ORDER — SODIUM CHLORIDE 0.9 % (FLUSH) 0.9 %
10 SYRINGE (ML) INJECTION EVERY 12 HOURS SCHEDULED
Status: DISCONTINUED | OUTPATIENT
Start: 2021-04-01 | End: 2021-04-03 | Stop reason: HOSPADM

## 2021-04-01 RX ORDER — AMLODIPINE BESYLATE 5 MG/1
5 TABLET ORAL 2 TIMES DAILY
Status: DISCONTINUED | OUTPATIENT
Start: 2021-04-01 | End: 2021-04-03 | Stop reason: HOSPADM

## 2021-04-01 RX ORDER — ONDANSETRON 2 MG/ML
4 INJECTION INTRAMUSCULAR; INTRAVENOUS ONCE
Status: COMPLETED | OUTPATIENT
Start: 2021-04-01 | End: 2021-04-01

## 2021-04-01 RX ORDER — ACETAMINOPHEN 325 MG/1
650 TABLET ORAL EVERY 6 HOURS PRN
Status: DISCONTINUED | OUTPATIENT
Start: 2021-04-01 | End: 2021-04-03 | Stop reason: HOSPADM

## 2021-04-01 RX ORDER — PROMETHAZINE HYDROCHLORIDE 25 MG/1
12.5 TABLET ORAL EVERY 6 HOURS PRN
Status: DISCONTINUED | OUTPATIENT
Start: 2021-04-01 | End: 2021-04-03 | Stop reason: HOSPADM

## 2021-04-01 RX ORDER — HYDRALAZINE HYDROCHLORIDE 50 MG/1
50 TABLET, FILM COATED ORAL 2 TIMES DAILY
Status: DISCONTINUED | OUTPATIENT
Start: 2021-04-01 | End: 2021-04-03 | Stop reason: HOSPADM

## 2021-04-01 RX ADMIN — DIVALPROEX SODIUM 250 MG: 250 TABLET, EXTENDED RELEASE ORAL at 23:02

## 2021-04-01 RX ADMIN — RANOLAZINE 1000 MG: 500 TABLET, FILM COATED, EXTENDED RELEASE ORAL at 22:01

## 2021-04-01 RX ADMIN — SODIUM CHLORIDE 500 ML: 9 INJECTION, SOLUTION INTRAVENOUS at 16:58

## 2021-04-01 RX ADMIN — OXYCODONE HYDROCHLORIDE AND ACETAMINOPHEN 1 TABLET: 7.5; 325 TABLET ORAL at 22:01

## 2021-04-01 RX ADMIN — SODIUM CHLORIDE: 9 INJECTION, SOLUTION INTRAVENOUS at 22:00

## 2021-04-01 RX ADMIN — SODIUM CHLORIDE: 9 INJECTION, SOLUTION INTRAVENOUS at 18:18

## 2021-04-01 RX ADMIN — BUDESONIDE AND FORMOTEROL FUMARATE DIHYDRATE 2 PUFF: 160; 4.5 AEROSOL RESPIRATORY (INHALATION) at 20:33

## 2021-04-01 RX ADMIN — ATORVASTATIN CALCIUM 80 MG: 80 TABLET, FILM COATED ORAL at 22:01

## 2021-04-01 RX ADMIN — DICYCLOMINE HYDROCHLORIDE 20 MG: 20 TABLET ORAL at 22:02

## 2021-04-01 RX ADMIN — ONDANSETRON 4 MG: 2 INJECTION INTRAMUSCULAR; INTRAVENOUS at 16:58

## 2021-04-01 RX ADMIN — MORPHINE SULFATE 4 MG: 4 INJECTION, SOLUTION INTRAMUSCULAR; INTRAVENOUS at 16:58

## 2021-04-01 RX ADMIN — INSULIN GLARGINE 8 UNITS: 100 INJECTION, SOLUTION SUBCUTANEOUS at 22:02

## 2021-04-01 RX ADMIN — QUETIAPINE FUMARATE 25 MG: 25 TABLET ORAL at 22:01

## 2021-04-01 ASSESSMENT — PAIN DESCRIPTION - PAIN TYPE: TYPE: ACUTE PAIN

## 2021-04-01 ASSESSMENT — PAIN DESCRIPTION - ONSET
ONSET: ON-GOING

## 2021-04-01 ASSESSMENT — ENCOUNTER SYMPTOMS
STRIDOR: 0
NAUSEA: 0
CHEST TIGHTNESS: 1
BACK PAIN: 0
VOMITING: 0
WHEEZING: 0
ABDOMINAL PAIN: 0
COUGH: 0
SHORTNESS OF BREATH: 1
SORE THROAT: 0

## 2021-04-01 ASSESSMENT — PAIN SCALES - GENERAL
PAINLEVEL_OUTOF10: 5
PAINLEVEL_OUTOF10: 10
PAINLEVEL_OUTOF10: 8
PAINLEVEL_OUTOF10: 6

## 2021-04-01 ASSESSMENT — PAIN DESCRIPTION - LOCATION
LOCATION: CHEST
LOCATION: BACK;CHEST;ARM
LOCATION: BACK;CHEST;ARM

## 2021-04-01 ASSESSMENT — PAIN DESCRIPTION - FREQUENCY: FREQUENCY: CONTINUOUS

## 2021-04-01 ASSESSMENT — PAIN DESCRIPTION - PROGRESSION
CLINICAL_PROGRESSION: NOT CHANGED

## 2021-04-01 ASSESSMENT — HEART SCORE: ECG: 0

## 2021-04-01 ASSESSMENT — PAIN SCALES - WONG BAKER: WONGBAKER_NUMERICALRESPONSE: 0

## 2021-04-01 NOTE — ED NOTES
Report called to Luis Bain on 4N. Pt alert and oriented and VSS at this time.       Bharathi Barahona RN  04/01/21 [id number]

## 2021-04-01 NOTE — PROGRESS NOTES
Medication Reconciliation    List of medications patient is currently taking is complete. Source of information: 1. Conversation with patient at bedside                                      2. EPIC records      Notes regarding home medications:   1. Patient received all of her morning home medications prior to arrival to the emergency department. 2. Patient reports that her nightly dose of metoprolol succinate has been increased where she takes 0.5 tabs every morning and 1 tab every evening. 3. Patient now takes olanzapine 5 mg nightly  4. Patient reports that she was taken off of isosorbide mononitrate 2/2 headaches  5. Patient no longer takes amitriptyline, topiramate, and gabapentin  6. Patient denies any other OTC or herbal medication use.     Yolanda Candelario, Pharmacy Intern  4/1/2021 7:15 PM

## 2021-04-01 NOTE — H&P
Hospital Medicine History & Physical      PCP: Stepan Michaud MD    Date of Admission: 4/1/2021    Date of Service: Pt seen/examined on 4/1/2021    Chief Complaint:      Chief Complaint   Patient presents with    Back Pain     pt c/o pain radiating from her chest, down left arm, and towards her back    Chest Pain       History Of Present Illness:     56-year-old female with past medical history of coronary artery disease status post PCI, diabetes mellitus, CKD stage III, hypertension, mood disorder presented to the hospital with sudden onset of chest pain. Patient states that around 3 PM she was just sitting around watching TV when she started having sudden chest pain which was sharp in intensity and located on the center of her chest.  It was radiating to her left arm and back area. Patient said that she instantly took some sublingual nitroglycerin without any improvement of her symptoms. She also took 4 baby aspirin. Due to the consistent pain she decided to come to the hospital.  On arrival she was noted to be hypertensive. Lab work showed elevated creatinine of 1.8 above her baseline. Initial troponin was negative chest x-ray was unremarkable.   Patient was admitted to the hospital for further management    Past Medical History:        Diagnosis Date    Acid reflux     Anemia     Anxiety     Arthritis     Asthma     Atrial fibrillation ([address]     Blood transfusion reaction     CAD (coronary artery disease) 12/3/2012    Cerebral artery occlusion with cerebral infarction (HCC)     [facility]     Chronic kidney disease     40% kidney functiom    COPD (chronic obstructive pulmonary disease) ([facility]     Depression     DM2 (diabetes mellitus, type 2) ([address]     Dysarthria     Fibromyalgia 6/7/2016    Headache(784.0) 2/19/2013    Hemisensory loss     Hyperlipidemia     Hypertension     IBS (irritable bowel syndrome)     Inferior vena cava occlusion ([address]     Irritable bowel syndrome     Keratitis     MI, old     Neuropathy     Superior vena cava obstruction     Temporal arteritis ([address] 2/24/2014       Past Surgical History:        Procedure Laterality Date    ABLATION OF DYSRHYTHMIC FOCUS      ARTERY BIOPSY Right 04/23/2014    RIGHT TEMPORAL ARTERY BIOPSY    CATARACT REMOVAL Bilateral     CHOLECYSTECTOMY      CORONARY ANGIOPLASTY WITH STENT PLACEMENT      CORONARY ANGIOPLASTY WITH STENT PLACEMENT      HYSTERECTOMY      JOINT REPLACEMENT Right     KNEE ARTHROSCOPY Right     KNEE SURGERY      right knee replacement    PTCA  10/2019    LAD and RCA inrtervention    TUNNELED VENOUS PORT PLACEMENT      left thigh. SMART PORT    UPPER GASTROINTESTINAL ENDOSCOPY N/A 7/6/2020    EGD DIAGNOSTIC ONLY performed by Cortney De Los Santos MD at [address]         Medications Prior to Admission:    Prior to Admission medications    Medication Sig Start Date End Date Taking? Authorizing Provider   alirocumab (PRALUENT) 75 MG/ML SOAJ injection pen Inject 1 mL into the skin every 14 days 3/29/21   CYRUS [place] - CNP   DULoxetine (CYMBALTA) 60 MG extended release capsule Take 1 capsule by mouth daily 3/19/21   Padmini Jesus MD   oxyCODONE-acetaminophen (PERCOCET) 7.5-325 MG per tablet Take 1 tablet by mouth every 6 hours as needed for Pain (max 3-4 day) for up to 28 days.  3/19/21 4/16/21  Padmini Jesus MD   rizatriptan (MAXALT) 10 MG tablet Take 1 tablet by mouth once as needed for Migraine May repeat in 2 hours if needed 3/19/21 3/19/21  Padmini Jesus MD   Erenumab-aooe (AIMOVIG) 70 MG/ML SOAJ INJECT 140 MLS INTO THE SKIN EVERY 30 DAYS 3/19/21   Padmini Jesus MD   QUEtiapine (SEROQUEL) 25 MG tablet Take 1-2 tablets by mouth nightly 3/19/21   Padmini Jesus MD   insulin aspart (NOVOLOG FLEXPEN) 100 UNIT/ML injection pen Inject 3 Units into the skin 3 times daily (before meals)    Historical Provider, MD   torsemide (DEMADEX) 10 MG tablet Take 10 mg by mouth daily    Historical Provider, MD   tiZANidine (ZANAFLEX) 4 MG tablet Take 4 mg by mouth daily as needed    Historical Provider, MD   divalproex (DEPAKOTE ER) 250 MG extended release tablet Take 250 mg by mouth 3 times daily    Historical Provider, MD   omeprazole (PRILOSEC) 20 MG delayed release capsule TAKE 1 CAPSULE BY MOUTH DAILY 3/5/21   Kristi Darby MD   insulin glargine (BASAGLAR KWIKPEN) 100 UNIT/ML injection pen Inject 8 Units into the skin nightly 3/5/21   Melda Sandifer, MD   metoprolol succinate (TOPROL XL) 50 MG extended release tablet TAKE 0.5 TABLETS BY MOUTH 2 TIMES DAILY (WITH MEALS) 2/5/21   CYRUS Crowell   aspirin 81 MG chewable tablet Take 1 tablet by mouth daily 12/24/20   Linda Garcia MD   dicyclomine (BENTYL) 20 MG tablet Take 20 mg by mouth 4 times daily (before meals and nightly)  12/9/20   Historical Provider, MD   nitroGLYCERIN (NITROSTAT) 0.4 MG SL tablet Place 1 tablet under the tongue every 5 minutes as needed for Chest pain up to max of 3 total doses. If no relief after 1 dose, call 911. 12/16/20   Kristi Darby MD   Nutritional Supplements (ENSURE) LIQD Take 1 Can by mouth 3 times daily as needed (nutrition) 12/16/20   Kristi Darby MD   hydrALAZINE (APRESOLINE) 50 MG tablet Take 1 tablet by mouth 2 times daily 10/1/20   Kristi Darby MD   atorvastatin (LIPITOR) 80 MG tablet Take 1 tablet by mouth nightly 10/1/20   Kristi Darby MD   amLODIPine (NORVASC) 5 MG tablet Take 1 tablet by mouth 2 times daily 10/1/20   Kristi Darby MD   lidocaine (XYLOCAINE) 5 % ointment Apply topically as needed.  10/1/20   Kristi Darby MD   blood glucose test strips (TRUE METRIX BLOOD GLUCOSE TEST) strip 1 each by Does not apply route 2 times daily 9/25/20   Kristi Darby MD   ondansetron (ZOFRAN) 4 MG tablet Take 1 tablet by mouth 3 times daily as needed for Nausea or Vomiting 9/23/20   Kristi Darby MD   clopidogrel (PLAVIX) 75 MG tablet TAKE 1 TABLET BY MOUTH ABIMBOLA VICTORIA 9/17/20   Chitra Gudino MD   albuterol (PROVENTIL) (2.5 MG/3ML) 0.083% nebulizer solution Take 3 mLs by nebulization every 4 hours as needed for Wheezing 3/25/20   Chitra Gudino MD   budesonide-formoterol [facility]) 160-4.5 MCG/ACT AERO Inhale 2 puffs into the lungs daily 12/6/19   Chitra Gudino MD   albuterol sulfate HFA (VENTOLIN HFA) 108 (90 Base) MCG/ACT inhaler Inhale 2 puffs into the lungs every 4 hours as needed for Wheezing or Shortness of Breath 12/6/19   Chitra Gudino MD   ranolazine (RANEXA) 1000 MG extended release tablet Take 1 tablet by mouth 2 times daily 9/10/19   Chitra Gudino MD       Allergies:  Patient has no known allergies. Social History:       reports that she quit smoking about 2 years ago. Her smoking use included cigarettes. She has a 17.50 pack-year smoking history. She has quit using smokeless tobacco. She reports that she does not drink alcohol or use drugs. Family History:  Reviewed in detail and  Positive as follows:        Problem Relation Age of Onset    Diabetes Mother     Hypertension Mother     High Cholesterol Mother     Stroke Mother     Cancer Mother     No Known Problems Paternal Grandfather         lung issues        REVIEW OF SYSTEMS:   Positive review  noted in the HPI. All other systems reviewed and negative.     PHYSICAL EXAM:    BP 96/75   Pulse 66   Temp 98.8 °F (37.1 °C) (Oral)   Resp 14   SpO2 100%   General Appearance: alert and oriented to person, place and time, well developed and well- nourished, in no acute distress  Skin: warm and dry, no rash or erythema  Head: normocephalic and atraumatic  Eyes: pupils equal, round, and reactive to light, extraocular eye movements intact, conjunctivae normal  ENT: tympanic membrane, external ear and ear canal normal bilaterally, nose without deformity, nasal mucosa and turbinates normal without polyps  Neck: supple and non-tender without mass, no thyromegaly or thyroid nodules, no cervical lymphadenopathy  Pulmonary/Chest: clear to auscultation bilaterally- no wheezes, rales or rhonchi, normal air movement, no respiratory distress  Cardiovascular: normal rate, regular rhythm, normal S1 and S2, no murmurs, rubs, clicks, or gallops, Peripheral pulses good, Cap refill <3 sec, no carotid bruits  Abdomen: soft, non-tender, non-distended, normal bowel sounds, no masses or organomegaly  Extremities: Bilateral trace pedal edema  Musculoskeletal: normal range of motion, no joint swelling, deformity or tenderness  Neurologic: reflexes normal and symmetric, no cranial nerve deficit, gait, coordination and speech normal      LABS:        CBC   Recent Labs     04/01/21  1630   WBC 7.1   HGB 11.3*   HCT 34.3*         RENAL  Recent Labs     04/01/21  1630      K 4.6      CO2 20*   BUN 32*   CREATININE 1.8*     LFT'S  Recent Labs     04/01/21  1630   AST 45*   ALT 36   BILITOT 0.3   ALKPHOS 150*     COAG  Recent Labs     04/01/21  1630   INR 1.00     CARDIAC ENZYMES  Recent Labs     04/01/21  1630   TROPONINI <0.01       U/A:    Lab Results   Component Value Date    COLORU YELLOW 10/27/2020    WBCUA 7 10/27/2020    RBCUA 229 10/27/2020    MUCUS 1+ 05/23/2013    BACTERIA RARE 08/29/2019    CLARITYU CLOUDY 10/27/2020    SPECGRAV 1.022 10/27/2020    LEUKOCYTESUR TRACE 10/27/2020    BLOODU LARGE 10/27/2020    GLUCOSEU 250 10/27/2020    GLUCOSEU NEGATIVE 05/14/2012    AMORPHOUS Rare 12/11/2014       ABG    Lab Results   Component Value Date    YEL1YWI 16.6 07/28/2019    BEART -7.4 07/28/2019    F3JYUVGM 98.3 07/28/2019    PHART 7.367 07/28/2019    THGBART 10.9 11/16/2011    ISA3XXV 29.6 07/28/2019    PO2ART 97.4 07/28/2019    QZG1WTS 17.5 07/28/2019       UA:No results for input(s): NITRITE, COLORU, PHUR, LABCAST, WBCUA, RBCUA, MUCUS, TRICHOMONAS, YEAST, BACTERIA, CLARITYU, SPECGRAV, LEUKOCYTESUR, UROBILINOGEN, BILIRUBINUR, BLOODU, GLUCOSEU, KETUA, AMORPHOUS in the last 72 hours.    Microbiology:  No results for input(s): LABGRAM, LABANAE, ORG, CXSURG in the last 72 hours. Nasal Culture: No results for input(s): ORG, MRSAPCR in the last 72 hours. Blood Culture: No results for input(s): BC, BLOODCULT2, ORG in the last 72 hours. Fungal Culture:   No results for input(s): FUNGSM in the last 72 hours. No results for input(s): FUNCXBLD in the last 72 hours. CSF Culture:  No results for input(s): COLORCSF, APPEARCSF, CFTUBE, CLOTCSF, WBCCSF, RBCCSF, NEUTCSF, NUMCELLSCSF, LYMPHSCSF, MONOCSF, GLUCCSF, VOLCSF in the last 72 hours. Respiratory Culture:  No results for input(s): Shamir Roche in the last 72 hours. AFB:No results for input(s): AFBSMEAR in the last 72 hours. Urine Culture  No results for input(s): LABURIN in the last 72 hours. RADIOLOGY:    XR CHEST PORTABLE   Final Result   No acute findings             Previous medical records personally reviewed and analyzed         PHYSICIAN CERTIFICATION    I certify that Juancho Cortez is expected to be hospitalized for > 2 midnights based on the following assessment and plan:    ASSESSMENT/PLAN:    Chest pain, rule out ACS  History of coronary disease status post PCI  -Continue aspirin, Plavix statin  -Nitroglycerin as needed as needed  -Trend troponins  -Telemetry  -Of note patient was admitted for similar presentation earlier in March this year where she had a stress test done which was essentially normal    KOLBY on CKD  -Gentle IV fluids, recheck creatinine in the morning    Hypertension  -Continue home blood pressure medication    Diabetes mellitus  -Continue Lantus, insulin sliding scale    COPD  -Continue inhalers    Mood disorder  -Continue Depakote, Cymbalta    DVT Prophylaxis: Lovenox  Diet: Carb control  Code Status: Full    Dispo -pending clinical course       Benjamin Chang MD  The note was completed using EMR.  Every effort was made to ensure accuracy; however, inadvertent computerized transcription errors may be present. Thank you Theo Del Cid MD for the opportunity to be involved in this patient's care. If you have any questions or concerns please feel free to contact me at [phone].

## 2021-04-01 NOTE — ED PROVIDER NOTES
the HPI. REVIEW OF SYSTEMS    (2-9 systems for level 4, 10 or more for level 5)     Review of Systems   Constitutional: Negative for chills and fever. HENT: Negative for congestion and sore throat. Eyes: Negative for visual disturbance. Respiratory: Positive for chest tightness and shortness of breath. Negative for cough, wheezing and stridor. Cardiovascular: Positive for chest pain. Negative for palpitations and leg swelling. Gastrointestinal: Negative for abdominal pain, nausea and vomiting. Endocrine: Negative for polydipsia and polyuria. Genitourinary: Negative for difficulty urinating and dysuria. Musculoskeletal: Negative for back pain, neck pain and neck stiffness. Skin: Negative for rash and wound. Allergic/Immunologic: Negative for immunocompromised state. Neurological: Negative for dizziness, weakness and light-headedness. Hematological: Does not bruise/bleed easily. Psychiatric/Behavioral: Negative for suicidal ideas. Positives and Pertinent negatives as per HPI. Except as noted above in the ROS, all other systems were reviewed and negative.        PAST MEDICAL HISTORY     Past Medical History:   Diagnosis Date    Acid reflux     Anemia     Anxiety     Arthritis     Asthma     Atrial fibrillation ([address]     Blood transfusion reaction     CAD (coronary artery disease) 12/3/2012    Cerebral artery occlusion with cerebral infarction ([address]     CHF (congestive heart failure) (HCC)     Chronic kidney disease     40% kidney functiom    [facility]ch[facility] obstructive pulmonary disease) ([facility])     Depression     DM2 (diabetes mellitus, type 2) ([address]     Dysarthria     Fibromyalgia 6/7/2016    Headache(784.0) 2/19/2013    Hemisensory loss     Hyperlipidemia     Hypertension     IBS (irritable bowel syndrome)     Inferior vena cava occlusion (HCC)     Irritable bowel syndrome     Keratitis     MI, old     Neuropathy     Superior vena cava obstruction     Tem[facility] arteritis ([facility] 2/24/2014         SURGICAL HISTORY     Past Surgical History:   Procedure Laterality Date    ABLATION OF DYSRHYTHMIC FOCUS      ARTERY BIOPSY Right 04/23/2014    RIGHT TEMPORAL ARTERY BIOPSY    CATARACT REMOVAL Bilateral     CHOLECYSTECTOMY      CORONARY ANGIOPLASTY WITH STENT PLACEMENT      CORONARY ANGIOPLASTY WITH STENT PLACEMENT      HYSTERECTOMY      JOINT REPLACEMENT Right     KNEE ARTHROSCOPY Right     KNEE SURGERY      right knee replacement    PTCA  10/2019    LAD and RCA inrtervention    TUNNELED VENOUS PORT PLACEMENT      left thigh.   SMART PORT    UPPER GASTROINTESTINAL ENDOSCOPY N/A 7/6/2020    EGD DIAGNOSTIC ONLY performed by Whitney Grant MD at [facility]       Previous Medications    ALBUTEROL (PROVENTIL) (2.5 MG/3ML) 0.083% NEBULIZER SOLUTION    Take 3 mLs by nebulization every 4 hours as needed for Wheezing    ALBUTEROL SULFATE HFA (VENTOLIN HFA) 108 (90 BASE) MCG/ACT INHALER    Inhale 2 puffs into the lungs every 4 hours as needed for Wheezing or Shortness of Breath    ALIROCUMAB (PRALUENT) 75 MG/ML SOAJ INJECTION PEN    Inject 1 mL into the skin every 14 days    AMLODIPINE (NORVASC) 5 MG TABLET    Take 1 tablet by mouth 2 times daily    ASPIRIN 81 MG CHEWABLE TABLET    Take 1 tablet by mouth daily    ATORVASTATIN (LIPITOR) 80 MG TABLET    Take 1 tablet by mouth nightly    BLOOD GLUCOSE TEST STRIPS (TRUE METRIX BLOOD GLUCOSE TEST) STRIP    1 each by Does not apply route 2 times daily    BUDESONIDE-FORMOTEROL (SYMBICORT) 160-4.5 MCG/ACT AERO    Inhale 2 puffs into the lungs daily    CLOPIDOGREL (PLAVIX) 75 MG TABLET    TAKE 1 TABLET BY MOUTH DA JULIEN    DICYCLOMINE (BENTYL) 20 MG TABLET    Take 20 mg by mouth 4 times daily (before meals and nightly)     DIVALPROEX (DEPAKOTE ER) 250 MG EXTENDED RELEASE TABLET    Take 250 mg by mouth 3 times daily    DULOXETINE (CYMBALTA) 60 MG EXTENDED RELEASE CAPSULE    Take 1 capsule by mouth daily    ERENUMAB-AOOE (AIMOVIG) 70 MG/ML SOAJ    INJECT 140 MLS INTO THE SKIN EVERY 30 DAYS    HYDRALAZINE (APRESOLINE) 50 MG TABLET    Take 1 tablet by mouth 2 times daily    INSULIN ASPART (NOVOLOG FLEXPEN) 100 UNIT/ML INJECTION PEN    Inject 3 Units into the skin 3 times daily (before meals)    INSULIN GLARGINE (BASAGLAR KWIKPEN) 100 UNIT/ML INJECTION PEN    Inject 8 Units into the skin nightly    LIDOCAINE (XYLOCAINE) 5 % OINTMENT    Apply topically as needed. METOPROLOL SUCCINATE (TOPROL XL) 50 MG EXTENDED RELEASE TABLET    TAKE 0.5 TABLETS BY MOUTH 2 TIMES DAILY (WITH MEALS)    NITROGLYCERIN (NITROSTAT) 0.4 MG SL TABLET    Place 1 tablet under the tongue every 5 minutes as needed for Chest pain up to max of 3 total doses. If no relief after 1 dose, call 911. NUTRITIONAL SUPPLEMENTS (ENSURE) LIQD    Take 1 Can by mouth 3 times daily as needed (nutrition)    OMEPRAZOLE (PRILOSEC) 20 MG DELAYED RELEASE CAPSULE    TAKE 1 CAPSULE BY MOUTH DAILY    ONDANSETRON (ZOFRAN) 4 MG TABLET    Take 1 tablet by mouth 3 times daily as needed for Nausea or Vomiting    OXYCODONE-ACETAMINOPHEN (PERCOCET) 7.5-325 MG PER TABLET    Take 1 tablet by mouth every 6 hours as needed for Pain (max 3-4 day) for up to 28 days. QUETIAPINE (SEROQUEL) 25 MG TABLET    Take 1-2 tablets by mouth nightly    RANOLAZINE (RANEXA) 1000 MG EXTENDED RELEASE TABLET    Take 1 tablet by mouth 2 times daily    RIZATRIPTAN (MAXALT) 10 MG TABLET    Take 1 tablet by mouth once as needed for Migraine May repeat in 2 hours if needed    TIZANIDINE (ZANAFLEX) 4 MG TABLET    Take 4 mg by mouth daily as needed    TORSEMIDE (DEMADEX) 10 MG TABLET    Take 10 mg by mouth daily         ALLERGIES     Patient has no known allergies.     FAMILYHISTORY       Family History   Problem Relation Age of Onset    Diabetes Mother     Hypertension Mother     High Cholesterol Mother     Stroke Mother     Cancer Mother     No Known Problems Paternal Grandfather         lung issues           SOCIAL HISTORY       Social History     Tobacco Use    Smoking status: Former Smoker     Packs/day: 0.50     Years: 35.00     Pack years: 17.50     Types: Cigarettes     Quit date: 2018     Years since quittin.7    Smokeless tobacco: Former User    Tobacco comment: 5/13/15 has not smoked since hospitalization -    Substance Use Topics    Alcohol use: No     Alcohol/week: 0.0 standard drinks    Drug use: No       SCREENINGS   NIH Stroke Scale  NIH Stroke Scale Assessed: [facility] Coma Scale  Eye Opening: Spontaneous  Best Verbal Response: Oriented  Best Motor Response: Obeys commands  [place] Coma Scale Score: 15 Heart Score for chest pain patients  History: Highly Suspicious  ECG: Normal  Patient Age: > 39 and < 65 years  *Risk factors for Atherosclerotic disease: Cigarette smoking, Diabetes Mellitus, Hypercholesterolemia, Hypertension, Coronary Artery Disease, Positive family History  Risk Factors: > 3 Risk factors or history of atherosclerotic disease*  Troponin: < 1X normal limit  Heart Score Total: 5      PHYSICAL EXAM    (up to 7 for level 4, 8 or more for level 5)     ED Triage Vitals [21 1614]   BP Temp Temp Source Pulse Resp SpO2 Height Weight   (!) 168/76 98.8 °F (37.1 °C) Oral 68 20 100 % -- --       Physical Exam  Vitals signs and nursing note reviewed. Constitutional:       General: She is not in acute distress. Appearance: Normal appearance. She is normal weight. She is not ill-appearing or toxic-appearing. HENT:      Head: Normocephalic and atraumatic. Right Ear: External ear normal.      Left Ear: External ear normal.      Nose: Nose normal.      Mouth/Throat:      Mouth: Mucous membranes are moist.   Eyes:      General:         Right eye: No discharge. Left eye: No discharge. Extraocular Movements: Extraocular movements intact. Neck:      Musculoskeletal: Normal range of motion and neck supple. Cardiovascular:      Rate and Rhythm: Normal rate and regular rhythm. Pulses: Normal pulses. Heart sounds: Normal heart sounds. Pulmonary:      Effort: Pulmonary effort is normal. No respiratory distress. Abdominal:      Palpations: Abdomen is soft. Tenderness: There is no abdominal tenderness. Musculoskeletal: Normal range of motion. Skin:     General: Skin is warm and dry. Capillary Refill: Capillary refill takes less than 2 seconds. Neurological:      General: No focal deficit present. Mental Status: She is alert and oriented to person, place, and time.    Psychiatric:         Mood and Affect: Mood normal.         Behavior: Behavior normal.         DIAGNOSTIC RESULTS   LABS:    Labs Reviewed   CBC WITH AUTO DIFFERENTIAL - Abnormal; Notable for the following components:       Result Value    RBC 3.45 (*)     Hemoglobin 11.3 (*)     Hematocrit 34.3 (*)     All other components within normal limits    Narrative:     Performed at:  [facility]  [address] [place], [place] [address]   Phone [phone]   COMPREHENSIVE METABOLIC PANEL W/ REFLEX TO MG FOR LOW K - Abnormal; Notable for the following components:    CO2 20 (*)     Anion Gap 17 (*)     Glucose 124 (*)     BUN 32 (*)     CREATININE 1.8 (*)     GFR Non- 28 (*)     GFR  34 (*)     Total Protein 9.3 (*)     Alkaline Phosphatase 150 (*)     AST 45 (*)     All other components within normal limits    Narrative:     Performed at:  [facility]  [address] [place], [place] [address]   Phone [phone]   TROPONIN    Narrative:     Performed at:  [facility]  [address] [place], [place] [address]   Phone [phone]   PROTIME-INR    Narrative:     Performed at:  [facility]  [address] [place], [place] [address]   Phone [phone]   APTT [facility]) injection 4 mg (4 mg Intravenous Given 4/1/21 1658)   morphine (PF) injection 4 mg (4 mg Intravenous Given 4/1/21 1658)   0.9 % sodium chloride bolus (0 mLs Intravenous Stopped 4/1/21 1758)       This is a 60 y.o. AA female known coronary artery disease with multiple percutaneous procedures in the past presents with shortness of breath and chest pain. She states her symptoms began approximately 45 minutes prior to arrival. Patient was recently admitted to this hospital approximately 3 weeks ago for similar symptoms. Patient had a recent cardiac cath in November 2020 with intervention. Patient does have history of multiple comorbid's including atrial fibrillation, CHF, chronic kidney disease, COPD, diabetes, hyperlipidemia, and hypertension. Patient received 2 sublingual nitroglycerin tablets in route without relief of symptoms. She did have 4 baby aspirin as well. EKG interpreted by ED physician and reviewed by myself is negative for acute ST elevation. Portable chest x-ray is negative. Patient given IV morphine 4 mg here in the ED with little relief of her symptoms. Laboratory studies notable for an increased creatinine to 1.8 with a decreased GFR. Remaining laboratory studies have been unremarkable. Despite intervention, patient does continue to report significant chest pain. Her D-dimer is negative, low suspicion for aortic dissection or PE. Patient's heart score is 5. Given patient's significant cardiac history in addition to her comorbidities I do feel she would benefit from admission for further evaluation and treatment. 18:00-Patient admitted under hospitalist service. FINAL IMPRESSION      1. Chest pain, unspecified type    2. Acute renal failure, unspecified acute renal failure type [facility])          DISPOSITION/PLAN   DISPOSITION Admitted 04/01/2021 06:06:19 PM      PATIENT REFERREDTO:  No follow-up provider specified.     DISCHARGE MEDICATIONS:  New Prescriptions    No medications on

## 2021-04-01 NOTE — ED NOTES
Bed: [facility]  Expected date: 4/1/21  Expected time: 4:09 PM  Means of arrival: [facility]  Comments:  65F chest pain       Jacob Dan [facility]  04/01/21 0887

## 2021-04-02 ENCOUNTER — APPOINTMENT (OUTPATIENT)
Dept: CT IMAGING | Age: 66
DRG: 683 | End: 2021-04-02
Payer: COMMERCIAL

## 2021-04-02 ENCOUNTER — APPOINTMENT (OUTPATIENT)
Dept: MRI IMAGING | Age: 66
DRG: 683 | End: 2021-04-02
Payer: COMMERCIAL

## 2021-04-02 LAB
ANION GAP SERPL CALCULATED.3IONS-SCNC: 11 MMOL/L (ref 3–16)
BASOPHILS ABSOLUTE: 0 K/UL (ref 0–0.2)
BASOPHILS RELATIVE PERCENT: 0.5 %
BUN BLDV-MCNC: 30 MG/DL (ref 7–20)
CALCIUM SERPL-MCNC: 8.4 MG/DL (ref 8.3–10.6)
CHLORIDE BLD-SCNC: 108 MMOL/L (ref 99–110)
CO2: 21 MMOL/L (ref 21–32)
CREAT SERPL-MCNC: 1.6 MG/DL (ref 0.6–1.2)
EKG ATRIAL RATE: 66 BPM
EKG DIAGNOSIS: NORMAL
EKG P AXIS: -29 DEGREES
EKG P-R INTERVAL: 96 MS
EKG Q-T INTERVAL: 434 MS
EKG QRS DURATION: 84 MS
EKG QTC CALCULATION (BAZETT): 454 MS
EKG R AXIS: 38 DEGREES
EKG T AXIS: 121 DEGREES
EKG VENTRICULAR RATE: 66 BPM
EOSINOPHILS ABSOLUTE: 0.1 K/UL (ref 0–0.6)
EOSINOPHILS RELATIVE PERCENT: 3.8 %
ESTIMATED AVERAGE GLUCOSE: 191.5 MG/DL
FERRITIN: 386.9 NG/ML (ref 15–150)
GFR AFRICAN AMERICAN: 39
GFR NON-AFRICAN AMERICAN: 32
GLUCOSE BLD-MCNC: 121 MG/DL (ref 70–99)
GLUCOSE BLD-MCNC: 150 MG/DL (ref 70–99)
GLUCOSE BLD-MCNC: 156 MG/DL (ref 70–99)
GLUCOSE BLD-MCNC: 161 MG/DL (ref 70–99)
GLUCOSE BLD-MCNC: 97 MG/DL (ref 70–99)
HBA1C MFR BLD: 8.3 %
HCT VFR BLD CALC: 30.4 % (ref 36–48)
HEMOGLOBIN: 9.9 G/DL (ref 12–16)
IRON SATURATION: 31 % (ref 15–50)
IRON: 72 UG/DL (ref 37–145)
LYMPHOCYTES ABSOLUTE: 1.2 K/UL (ref 1–5.1)
LYMPHOCYTES RELATIVE PERCENT: 31.5 %
MCH RBC QN AUTO: 32 PG (ref 26–34)
MCHC RBC AUTO-ENTMCNC: 32.5 G/DL (ref 31–36)
MCV RBC AUTO: 98.4 FL (ref 80–100)
MONOCYTES ABSOLUTE: 0.3 K/UL (ref 0–1.3)
MONOCYTES RELATIVE PERCENT: 6.7 %
NEUTROPHILS ABSOLUTE: 2.2 K/UL (ref 1.7–7.7)
NEUTROPHILS RELATIVE PERCENT: 57.5 %
PDW BLD-RTO: 15.2 % (ref 12.4–15.4)
PERFORMED ON: ABNORMAL
PERFORMED ON: NORMAL
PHOSPHORUS: 3.9 MG/DL (ref 2.5–4.9)
PLATELET # BLD: 171 K/UL (ref 135–450)
PMV BLD AUTO: 8.3 FL (ref 5–10.5)
POTASSIUM REFLEX MAGNESIUM: 4.4 MMOL/L (ref 3.5–5.1)
RBC # BLD: 3.09 M/UL (ref 4–5.2)
SODIUM BLD-SCNC: 140 MMOL/L (ref 136–145)
TOTAL IRON BINDING CAPACITY: 230 UG/DL (ref 260–445)
TROPONIN: <0.01 NG/ML
WBC # BLD: 3.8 K/UL (ref 4–11)

## 2021-04-02 PROCEDURE — 96372 THER/PROPH/DIAG INJ SC/IM: CPT

## 2021-04-02 PROCEDURE — 97166 OT EVAL MOD COMPLEX 45 MIN: CPT

## 2021-04-02 PROCEDURE — 2580000003 HC RX 258: Performed by: INTERNAL MEDICINE

## 2021-04-02 PROCEDURE — 2580000003 HC RX 258: Performed by: FAMILY MEDICINE

## 2021-04-02 PROCEDURE — 6370000000 HC RX 637 (ALT 250 FOR IP): Performed by: INTERNAL MEDICINE

## 2021-04-02 PROCEDURE — 83540 ASSAY OF IRON: CPT

## 2021-04-02 PROCEDURE — 83550 IRON BINDING TEST: CPT

## 2021-04-02 PROCEDURE — 36415 COLL VENOUS BLD VENIPUNCTURE: CPT

## 2021-04-02 PROCEDURE — 96376 TX/PRO/DX INJ SAME DRUG ADON: CPT

## 2021-04-02 PROCEDURE — 94640 AIRWAY INHALATION TREATMENT: CPT

## 2021-04-02 PROCEDURE — 80048 BASIC METABOLIC PNL TOTAL CA: CPT

## 2021-04-02 PROCEDURE — 85025 COMPLETE CBC W/AUTO DIFF WBC: CPT

## 2021-04-02 PROCEDURE — 6360000002 HC RX W HCPCS: Performed by: INTERNAL MEDICINE

## 2021-04-02 PROCEDURE — 84484 ASSAY OF TROPONIN QUANT: CPT

## 2021-04-02 PROCEDURE — 93010 ELECTROCARDIOGRAM REPORT: CPT | Performed by: INTERNAL MEDICINE

## 2021-04-02 PROCEDURE — 84100 ASSAY OF PHOSPHORUS: CPT

## 2021-04-02 PROCEDURE — 82728 ASSAY OF FERRITIN: CPT

## 2021-04-02 PROCEDURE — 73721 MRI JNT OF LWR EXTRE W/O DYE: CPT

## 2021-04-02 PROCEDURE — 97530 THERAPEUTIC ACTIVITIES: CPT

## 2021-04-02 PROCEDURE — G0378 HOSPITAL OBSERVATION PER HR: HCPCS

## 2021-04-02 PROCEDURE — 94760 N-INVAS EAR/PLS OXIMETRY 1: CPT

## 2021-04-02 PROCEDURE — 1200000000 HC SEMI PRIVATE

## 2021-04-02 PROCEDURE — 73700 CT LOWER EXTREMITY W/O DYE: CPT

## 2021-04-02 RX ORDER — 0.9 % SODIUM CHLORIDE 0.9 %
500 INTRAVENOUS SOLUTION INTRAVENOUS ONCE
Status: DISCONTINUED | OUTPATIENT
Start: 2021-04-02 | End: 2021-04-02

## 2021-04-02 RX ORDER — SODIUM CHLORIDE 9 MG/ML
INJECTION, SOLUTION INTRAVENOUS CONTINUOUS
Status: ACTIVE | OUTPATIENT
Start: 2021-04-02 | End: 2021-04-02

## 2021-04-02 RX ORDER — 0.9 % SODIUM CHLORIDE 0.9 %
500 INTRAVENOUS SOLUTION INTRAVENOUS ONCE
Status: COMPLETED | OUTPATIENT
Start: 2021-04-02 | End: 2021-04-02

## 2021-04-02 RX ADMIN — INSULIN GLARGINE 8 UNITS: 100 INJECTION, SOLUTION SUBCUTANEOUS at 20:59

## 2021-04-02 RX ADMIN — RANOLAZINE 1000 MG: 500 TABLET, FILM COATED, EXTENDED RELEASE ORAL at 20:57

## 2021-04-02 RX ADMIN — SODIUM CHLORIDE 500 ML: 9 INJECTION, SOLUTION INTRAVENOUS at 11:11

## 2021-04-02 RX ADMIN — ACETAMINOPHEN 650 MG: 325 TABLET ORAL at 17:40

## 2021-04-02 RX ADMIN — DIVALPROEX SODIUM 250 MG: 250 TABLET, EXTENDED RELEASE ORAL at 15:02

## 2021-04-02 RX ADMIN — INSULIN LISPRO 2 UNITS: 100 INJECTION, SOLUTION INTRAVENOUS; SUBCUTANEOUS at 08:51

## 2021-04-02 RX ADMIN — DICYCLOMINE HYDROCHLORIDE 20 MG: 20 TABLET ORAL at 06:21

## 2021-04-02 RX ADMIN — DICYCLOMINE HYDROCHLORIDE 20 MG: 20 TABLET ORAL at 17:40

## 2021-04-02 RX ADMIN — ATORVASTATIN CALCIUM 80 MG: 80 TABLET, FILM COATED ORAL at 20:58

## 2021-04-02 RX ADMIN — Medication 10 ML: at 20:57

## 2021-04-02 RX ADMIN — OXYCODONE HYDROCHLORIDE AND ACETAMINOPHEN 1 TABLET: 7.5; 325 TABLET ORAL at 08:59

## 2021-04-02 RX ADMIN — DULOXETINE HYDROCHLORIDE 60 MG: 60 CAPSULE, DELAYED RELEASE ORAL at 09:01

## 2021-04-02 RX ADMIN — OXYCODONE HYDROCHLORIDE AND ACETAMINOPHEN 1 TABLET: 7.5; 325 TABLET ORAL at 20:57

## 2021-04-02 RX ADMIN — ASPIRIN 81 MG: 81 TABLET, CHEWABLE ORAL at 08:59

## 2021-04-02 RX ADMIN — ONDANSETRON 4 MG: 2 INJECTION INTRAMUSCULAR; INTRAVENOUS at 03:56

## 2021-04-02 RX ADMIN — DICYCLOMINE HYDROCHLORIDE 20 MG: 20 TABLET ORAL at 20:58

## 2021-04-02 RX ADMIN — INSULIN LISPRO 2 UNITS: 100 INJECTION, SOLUTION INTRAVENOUS; SUBCUTANEOUS at 17:39

## 2021-04-02 RX ADMIN — OXYCODONE HYDROCHLORIDE AND ACETAMINOPHEN 1 TABLET: 7.5; 325 TABLET ORAL at 14:08

## 2021-04-02 RX ADMIN — BUDESONIDE AND FORMOTEROL FUMARATE DIHYDRATE 2 PUFF: 160; 4.5 AEROSOL RESPIRATORY (INHALATION) at 09:11

## 2021-04-02 RX ADMIN — RANOLAZINE 1000 MG: 500 TABLET, FILM COATED, EXTENDED RELEASE ORAL at 09:00

## 2021-04-02 RX ADMIN — SODIUM CHLORIDE: 9 INJECTION, SOLUTION INTRAVENOUS at 14:18

## 2021-04-02 RX ADMIN — ENOXAPARIN SODIUM 30 MG: 30 INJECTION SUBCUTANEOUS at 08:51

## 2021-04-02 RX ADMIN — CLOPIDOGREL BISULFATE 75 MG: 75 TABLET ORAL at 08:59

## 2021-04-02 RX ADMIN — PANTOPRAZOLE SODIUM 40 MG: 40 TABLET, DELAYED RELEASE ORAL at 06:21

## 2021-04-02 RX ADMIN — DICYCLOMINE HYDROCHLORIDE 20 MG: 20 TABLET ORAL at 12:00

## 2021-04-02 RX ADMIN — DIVALPROEX SODIUM 250 MG: 250 TABLET, EXTENDED RELEASE ORAL at 21:45

## 2021-04-02 RX ADMIN — DIVALPROEX SODIUM 250 MG: 250 TABLET, EXTENDED RELEASE ORAL at 09:05

## 2021-04-02 RX ADMIN — QUETIAPINE FUMARATE 25 MG: 25 TABLET ORAL at 20:57

## 2021-04-02 ASSESSMENT — PAIN DESCRIPTION - PROGRESSION
CLINICAL_PROGRESSION: NOT CHANGED

## 2021-04-02 ASSESSMENT — PAIN SCALES - WONG BAKER
WONGBAKER_NUMERICALRESPONSE: 0

## 2021-04-02 ASSESSMENT — PAIN SCALES - GENERAL
PAINLEVEL_OUTOF10: 4
PAINLEVEL_OUTOF10: 6
PAINLEVEL_OUTOF10: 6

## 2021-04-02 NOTE — CARE COORDINATION
INITIAL CASE MANAGEMENT ASSESSMENT    Reviewed chart, met with patient to assess possible discharge needs. Explained Case Management role/services. The SW Student talked to patient at bedside. Living Situation: The patient states that she lives at home alone in her [facility] apartment - there is an elevator    ADLs: The patient states that she gets help from her aide from [address]      DME: The patient states that she has cane, shower chair, and life alert button. PT/OT Recs: Home with Home Health Physical Therapy 2-3 sessions per week. Active Services: The patient is active with [facility]- receives an aide 5 days/week for 5 hours/day Aide assists with cooking/cleaning/laundry and prn bathing    is Lela Best #[phone]     Transportation: The patient stated states that she does not drive. The patient states that her family will pick her up at discharge. Medications: The patient states that she gets her medications delivered to her from [facility]. The patient has no issues with getting medications. PCP: [facility] -[phone] (phone number) and [phone] (fax number). The patient stated that the last time the patient saw her PCP was in March 2021. PLAN/COMMENTS: The patient's first choice is [address] [place] [address] as her second choice. 1) Send a referral to home health care company about PT needs. SW/CM provided contact information for patient or family to call with any questions. SW/CM will follow and assist as needed.     Nino Case (MSW intern)  [facility] [place]  Phone [phone]  Fax [phone]    Electronically signed by Logan Emery on 4/2/2021 at 4:38 PM

## 2021-04-02 NOTE — PROGRESS NOTES
Hospitalist Progress Note    CC: <principal problem not specified>      Admit date: 4/1/2021  Days in hospital:  1    Subjective/interval history: Pt S/E. Pt's bp dropped to 89/48 while standing with pt today. She also c/o right hip pain that got worse with standing. She denies chest pain or sob. O2 status:    ROS:   Pertinent items are noted in HPI. Objective:    BP (!) 91/52   Pulse 54   Temp 97.5 °F (36.4 °C) (Oral)   Resp 17   Ht 5' (1.524 m)   Wt 125 lb (56.7 kg)   SpO2 97%   BMI 24.41 kg/m²     Gen: alert, NAD, morbidly obese  HEENT: NC/AT, moist mucous membranes  Neck: supple, trachea midline  Heart: Normal s1/s2, RRR, no murmurs, gallops, or rubs. Lungs: clear bilaterally, no wheezing, no rales, no rhonchi, no use of accessory muscles  Abd: bowel sounds present, soft, nontender, nondistended, no masses  Extrem: No clubbing, cyanosis, no edema  Skin: no rashes or lesions  Psych: A & O x3, affect appropriate  Neuro: grossly intact, moves all four extremities spontaneously.   Cap refill: +2 sec    Medications:  Scheduled Meds:   ranolazine  1,000 mg Oral BID    budesonide-formoterol  2 puff Inhalation Daily    clopidogrel  75 mg Oral Daily    hydrALAZINE  50 mg Oral BID    atorvastatin  80 mg Oral Nightly    amLODIPine  5 mg Oral BID    dicyclomine  20 mg Oral 4x Daily AC & HS    aspirin  81 mg Oral Daily    metoprolol succinate  25 mg Oral BID WC    pantoprazole  40 mg Oral QAM AC    insulin glargine  8 Units Subcutaneous Nightly    divalproex  250 mg Oral TID    DULoxetine  60 mg Oral Daily    QUEtiapine  25 mg Oral Nightly    [Held by provider] torsemide  10 mg Oral Daily    sodium chloride flush  10 mL Intravenous 2 times per day    insulin lispro  0-12 Units Subcutaneous TID WC    insulin lispro  0-6 Units Subcutaneous Nightly    enoxaparin  30 mg Subcutaneous Daily       PRN Meds:  albuterol, ondansetron, tiZANidine, oxyCODONE-acetaminophen, sodium chloride flush, sodium chloride, promethazine **OR** ondansetron, polyethylene glycol, acetaminophen **OR** acetaminophen, glucose, dextrose, glucagon (rDNA), dextrose, nitroGLYCERIN    IV:   sodium chloride      sodium chloride 100 mL/hr at 04/01/21 1818    dextrose      sodium chloride 75 mL/hr at 04/01/21 2200         Intake/Output Summary (Last 24 hours) at 4/2/2021 0821  Last data filed at 4/2/2021 0622  Gross per 24 hour   Intake 611.31 ml   Output --   Net 611.31 ml       Results:  CBC:   Recent Labs     04/01/21  1630 04/02/21  0721   WBC 7.1 3.8*   HGB 11.3* 9.9*   HCT 34.3* 30.4*   MCV 99.4 98.4    171     BMP:   Recent Labs     04/01/21  1630 04/02/21  0721    140   K 4.6 4.4    108   CO2 20* 21   BUN 32* 30*   CREATININE 1.8* 1.6*     Mag: No results for input(s): MAG in the last 72 hours. Phos:   Lab Results   Component Value Date    PHOS 4.3 05/12/2020     No components found for: GLU    LIVER PROFILE:   Recent Labs     04/01/21  1630   AST 45*   ALT 36   BILITOT 0.3   ALKPHOS 150*     PT/INR:   Recent Labs     04/01/21  1630   PROTIME 11.6   INR 1.00     APTT:   Recent Labs     04/01/21  1630   APTT 35.4     UA:No results for input(s): NITRITE, COLORU, PHUR, LABCAST, WBCUA, RBCUA, MUCUS, TRICHOMONAS, YEAST, BACTERIA, CLARITYU, SPECGRAV, LEUKOCYTESUR, UROBILINOGEN, BILIRUBINUR, BLOODU, GLUCOSEU, AMORPHOUS in the last 72 hours. Invalid input(s): Giorgio Daniels input(s): ABG  Lab Results   Component Value Date    CALCIUM 8.4 04/02/2021    PHOS 4.3 05/12/2020       Assessment:    Active Problems:    KOLBY (acute kidney injury) ([address]  Resolved Problems:    * No resolved hospital problems. [facility] course: 59-year-old female with past medical history of coronary artery disease status post PCI, diabetes mellitus, CKD stage III, hypertension, mood disorder admitted with sudden onset of chest pain that started at rest. It was radiating to her left arm and back area. Patient said that she instantly took some sublingual nitroglycerin without any improvement of her symptoms. She also took 4 baby aspirin. On arrival she was noted to be hypertensive. Lab work showed elevated creatinine of 1.8 above her baseline. Initial troponin was negative chest x-ray was unremarkable. Plan:  Chest pain  - ECG shows no ST/T abnormalities  - troponins neg, will trend x 2 more draws  - cont plavix, ASA, statin  - stress test 3/9/2021 was negative for ischemia  - explained that if the evaluation does not show that the symptoms are secondary to ischemic changes, she will be discharged and instructed to follow up with her PCP for continued work up     Right hip pain  - possibly bursitis?  - will get a ct of the right hip  - consult ortho pending results, may need an intraarticular steroid injection    CKD III  - crt at baseline 1.6  - monitor    HTN  - low this am, unclear etiology.  Normally bp in the 120-130/60-70s  - will give a ivf bolus  - hold bp meds    CAD  - stable  - cont ASA, statin, BB    Diabetes  - stable  - hold home PO meds  - cont home insulin  - SSI, accuchecks    Code status:  full  DVT prophylaxis: [x] Lovenox  [] SQ Heparin  [] SCDs because of  [] warfarin/oral direct thrombin inhibitor [] Encourage ambulation      Disposition:  [] Home [] Rehab [] Psych [] SNF  [] LTAC  [] Transfer to ICU  [] Transfer to PCU [] Other: in pt, dc when bp normalizes      Electronically signed by Rajiv King DO on 4/2/2021 at 8:21 AM

## 2021-04-02 NOTE — PROGRESS NOTES
Physical Therapy    Facility/Department: [facility] 4N MED SURG  Initial Assessment  If patient discharges prior to next session this note will serve as a discharge summary. Please see below for the latest assessment towards goals. Heriberto Farias  : 1955  MRN: [id number]    Date of Service: 2021    Discharge Recommendations:  Patient would benefit from continued therapy after discharge, Home with assist PRN, S Level 1, Home with Home health PT   Erica Clayton scored a 20/24 on the AM-PAC short mobility form. Current research shows that an AM-PAC score of 18 or greater is typically associated with a discharge to the patient's home setting. Based on the patient's AM-PAC score and their current functional mobility deficits, it is recommended that the patient have 2-3 sessions per week of Physical Therapy at d/c to increase the patient's independence. At this time, this patient demonstrates the endurance and safety to discharge home with prn asisst and home PT(home vs OP services) and a follow up treatment frequency of 2-3x/wk. Please see assessment section for further patient specific details. PT Equipment Recommendations  Equipment Needed: Yes  Mobility Devices: [place]: Rollator (4 Wheeled)    Assessment   Assessment: The pt is a 59 yo female who came to the ED with chest pain. In the ED she was found to be hypertensive she had an elevated creatinine. The pt reports she lives in a 3rd floor apartment with elevator access. She has been using a cane at home and reports feeling off balance alot but denies recent falls. She has a HHA 5 days/week for some personal care and homemaking. PMHx: anemia, anxiety, arth, asthma, a-fib, CAD, CVA, CHF, CKD, COPD, depression, DM, dysarthria, fibromyalgia, HTN, MI, neuropathy, Temporal arteritis, cardiac stent (2020), R TKR, ablation for a-fib      Today, the pt reporting feeling lightheaded and her BP did drop with postion.  She was able to complete bed mobility with Supervison, transfers with CGA and ambulated just a short distance with a rollator and CGA. She did report her goal was to be able to go to her grandchildrens ganes but has been unable to due to her need for seated rest breaks and instablility with the cane. The pt was instructed in the use of a rollator and would benefit from one at home due to unsteadiness and the need for frequent seated rest breaks due to pain and imbalance. Anticipate that she will be safe for home at d/c with current level fo care, a rollator and home PT. Spoke with RN and Dr. May Lara re: R hip pain and further eval.  Prognosis: Good  Decision Making: Medium Complexity  History: see above  Exam: see above  Clinical Presentation: evolving  PT Education: PT Role;General Safety;Transfer Training; Adaptive Device Training  Barriers to Learning: none  REQUIRES PT FOLLOW UP: Yes  Activity Tolerance  Activity Tolerance: Patient limited by pain; Other  Activity Tolerance: limited today by low blood pressure       Patient Diagnosis(es): The primary encounter diagnosis was Chest pain, unspecified type. A diagnosis of Acute renal failure, unspecified acute renal failure type [facility]) was also pertinent to this visit. has a past medical history of Acid reflux, Anemia, Anxiety, Arthritis, Asthma, Atrial fibrillation (HCC), Blood transfusion reaction, CAD (coronary artery disease), Cerebral artery occlusion with cerebral infarction ([address] CHF (congestive heart failure) ([address] Chronic kidney disease, COPD (chronic obstructive pulmonary disease) ([address] Depression, DM2 (diabetes mellitus, type 2) ([address] Dysarthria, Fibromyalgia, Headache(784.0), Hemisensory loss, Hyperlipidemia, Hypertension, IBS (irritable bowel syndrome), Inferior vena cava occlusion ([address] Irritable bowel syndrome, Keratitis, MI, old, Neuropathy, Superior vena cava obstruction, and Temporal arteritis ([address]    has a past surgical history that includes knee surgery; Tunneled venous port placement; Coronary angioplasty with stent; Cholecystectomy; ablation of dysrhythmic focus; Cataract removal (Bilateral); joint replacement (Right); Hysterectomy; Artery Biopsy (Right, 04/23/2014); Coronary angioplasty with stent; Knee arthroscopy (Right); vascular surgery; Percutaneous Transluminal Coronary Angio (10/2019); and Upper gastrointestinal endoscopy (N/A, 7/6/2020). Restrictions  Restrictions/Precautions  Restrictions/Precautions: Fall Risk  Position Activity Restriction  Other position/activity restrictions: IV  Vision/Hearing  Vision: Impaired  Vision Exceptions: Wears glasses for reading(diabetic retinopathy)  Hearing: Within functional limits     Subjective  General  Chart Reviewed: Yes  Additional Pertinent Hx: Per H&P on 4-1-2021 of [facility], MD: The pt is a 59 yo female who came to the ED with chest pain. In the ED she was found to be hypertensive she had an elevated creatinine.       PMHx: anemia, anxiety, arth, asthma, a-fib, CAD, CVA, CHF, CKD, COPD, depression, DM, dysarthria, fibromyalgia, HTN, MI, neuropathy, Temporal arteritis, cardiac stent (Jan, 2020), R TKR, ablation for a-fib  Response To Previous Treatment: Not applicable  Family / Caregiver Present: No  Referring Practitioner: [facility] MD BRANDY  Referral Date : 04/01/21  Diagnosis: chest pain, KOLBY on CKD  Follows Commands: Within Functional Limits  Subjective  Subjective: the pt was found to be in the bed and she was reporting feeling lightheaded; she also is reporting pain in her R hip area but denies a recent fall or trauma  Pain Screening  Patient Currently in Pain: Yes          Orientation  Orientation  Overall Orientation Status: Within Functional Limits  Social/Functional History  Social/Functional History  Lives With: Alone  Type of Home: Apartment(3rd apartment (senior citizen building))  Home Access: Level entry, Elevator  Bathroom Shower/Tub: Walk-in shower  Bathroom Toilet: Standard  Bathroom Equipment: Grab bars in shower, Built-in shower seat, Hand-held shower, Grab bars around toilet  Bathroom Accessibility: Walker accessible  Home Equipment: [employer], [address] chair, Alert [employer] Help From: Home health(aide 5days/weeks, 5 hrs/day)  ADL Assistance: Independent(aide does assist with bathing if fibromyalgia is acting up)  [employer]: (aide does laundry, cleaning, cooking, groceries; gets MOW)  Ambulation Assistance: Independent(with cane)  Transfer Assistance: Independent(sleeps in regular bed)  Active : No  Patient's  Info: grandson takes for appointments or medical transport  Occupation: On disability  Additional Comments: Pt denies recent falls.   Cognition   Cognition  Overall Cognitive Status: WFL    Objective     Observation/Palpation  Palpation: tender to palpation over the R greater trochanter  Observation: BP supine 88/47; sitting 102/62; standing 89/57    AROM RLE (degrees)  RLE AROM: WFL  AROM LLE (degrees)  LLE AROM : WFL  Strength RLE  Comment: hip flexion 4-/5, knee extension 4-5, ankle DF 4/5  Strength LLE  Strength LLE: WFL     Sensation  Overall Sensation Status: Impaired(reports numbness and tingling in B hands and B feet)  Bed mobility  Supine to Sit: Supervision  Sit to Supine: Supervision  Scooting: Supervision  Transfers  Sit to Stand: Contact guard assistance  Stand to sit: Contact guard assistance  Ambulation  Ambulation?: Yes  Ambulation 1  Surface: level tile  Device: Rollator  Assistance: Contact guard assistance  Quality of Gait: slightly antalgic gait due to painful R hip, steady despite feeling lightheaded  Distance: 15 feet x 1  Comments: demonstrated safety in using the rollator for seated rest break; instructed in brake usage for all transfers and when sitting; the pt reports she needs frequent seated rest breaks due to pain and fatigue with ambulation  Stairs/Curb  Stairs?: No     Balance  Sitting - Static: Good  Sitting - Dynamic: Good  Standing - Static: Good  Standing - Dynamic: Good(with walker)        Plan   Plan  Times per week: 3-5x/week  Current Treatment Recommendations: Functional Mobility Training, Transfer Training, Gait Training, Strengthening, ROM  Safety Devices  Type of devices: Call light within reach, Bed alarm in place, Gait belt, Patient at risk for falls, Left in bed, Nurse notified      AM-PAC Score  AM-PAC Inpatient Mobility Raw Score : 20 (04/02/21 1102)  AM-PAC Inpatient T-Scale Score : 47.67 (04/02/21 1102)  Mobility Inpatient CMS 0-100% Score: 35.83 (04/02/21 1102)  Mobility Inpatient CMS G-Code Modifier : Mile Marks (04/02/21 1102)          Goals  Short term goals  Time Frame for Short term goals: upon d/c  Short term goal 1: Bed mobility with mod I. Short term goal 2: Transfers sit <> stand with mod I. Short term goal 3: Ambulate with rollator vs 2 wheeled walker 50 feet with Sup/mod I.   Patient Goals   Patient goals : to be able to go to her grand children's games       Therapy Time   Individual Concurrent Group Co-treatment   Time In 1017         Time Out 1100         Minutes 43         Timed Code Treatment Minutes: 28 Minutes       Electronically signed by Dara Denton, PT [id number] on 4/2/2021 at 11:03 AM

## 2021-04-02 NOTE — ED PROVIDER NOTES
Pt Name: Ignacio Peres  MRN: [id number]  [place] 1955  Date of evaluation: 4/1/2021    EKG Interpretation    The purpose of this note is for preliminary EKG interpretation only. This patient was seen independently by the mid-level provider and was not seen by this provider. EKG visualized preliminarily interpreted by myself shows sinus rhythm rate of 66 with an axis of 38. Intervals are normal.  Somewhat diffuse nonspecific T wave flattening.         Ramsey Santoyo MD  04/01/21 0613

## 2021-04-02 NOTE — PROGRESS NOTES
4 Eyes Skin Assessment     NAME:  Makayla Kee  YOB: 1955  MEDICAL RECORD NUMBER:  [id number]    The patient is being assess for  Admission    I agree that 2 RN's have performed a thorough Head to Toe Skin Assessment on the patient. ALL assessment sites listed below have been assessed. Areas assessed by both nurses:    Head, Face, Ears, Shoulders, Back, Chest, Arms, Elbows, Hands, Sacrum. Buttock, Coccyx, Ischium and Legs. Feet and Heels        Does the Patient have a Wound?  No noted wound(s)       Rakan Prevention initiated:  No   Wound Care Orders initiated:  No    Pressure Injury (Stage 3,4, Unstageable, DTI, NWPT, and Complex wounds) if present place consult order under [de-identified] No    New and Established Ostomies if present place consult order under : No      Nurse 1 eSignature: Electronically signed by Ronald Velázquez RN on 4/1/21 at 10:41 PM EDT    **SHARE this note so that the co-signing nurse is able to place an eSignature**    Nurse 2 eSignature: Electronically signed by Lakeisha Rosa RN on 4/1/21 at 11:40 PM EDT

## 2021-04-02 NOTE — CONSULTS
Nephrology Consult Note  [phone]  [phone]   [email or web address]        Reason for Consult:  CKD    HISTORY OF PRESENT ILLNESS:                This is a patient with significant past medical history of CKD stage 3 baseline Scr variable, ranging 1.2-1.6, HTN, CAD, A.fib, CVA, CHF, COPD, DM2, temporal arteritis, SVC obstruction, who presented with chest pain not associated with activity radiating to her back. She denies SOB, fever, chills. She was evaluated by cardiology. Dose of metoprolol increased. Bp was uncontrolled at presentation. She also reports right hip pain, CT is negative. This am her she was hypotensive, all BP meds on hold.       Past Medical History:        Diagnosis Date    Acid reflux     Anemia     Anxiety     Arthritis     Asthma     Atrial fibrillation (HCC)     Blood transfusion reaction     CAD (coronary artery disease) 12/3/2012    Cerebral [facility] occlusion with [facility] infarction [facility])     CHF (congestive heart failure) ([facility])     Chronic kidney disease     40% kidney functiom    COPD (chronic obstructive pulmonary disease) ([facility])     Depression     DM2 (diabetes mellitus, type 2) ([facility])     Dysarthria     Fibromyalgia 6/7/2016    Headache(784.0) 2/19/2013    Hemisensory loss     Hyperlipidemia     Hypertension     IBS (irritable bowel syndrome)     Inferior vena cava occlusion (HCC)     Irritable bowel syndrome     Keratitis     MI, old     Neuropathy     Superior vena cava obstruction     Temporal arteritis ([address] 2/24/2014       Past Surgical History:        Procedure Laterality Date    ABLATION OF DYSRHYTHMIC FOCUS      ARTERY BIOPSY Right 04/23/2014    RIGHT TEMPORAL ARTERY BIOPSY    CATARACT REMOVAL Bilateral     CHOLECYSTECTOMY      CORONARY ANGIOPLASTY WITH STENT PLACEMENT      CORONARY ANGIOPLASTY WITH STENT PLACEMENT      HYSTERECTOMY      JOINT REPLACEMENT Right     KNEE ARTHROSCOPY Right     KNEE SURGERY      right knee replacement    PTCA  10/2019    LAD and RCA inrtervention    TUNNELED VENOUS PORT PLACEMENT      left thigh. SMART PORT    UPPER GASTROINTESTINAL ENDOSCOPY N/A 7/6/2020    EGD DIAGNOSTIC ONLY performed by Charlie Damico MD at [address]         Current Medications:    No current facility-administered medications on file prior to encounter. Current Outpatient Medications on File Prior to Encounter   Medication Sig Dispense Refill    metoprolol succinate (TOPROL XL) 50 MG extended release tablet Take 50 mg by mouth nightly      OLANZapine (ZYPREXA) 5 MG tablet Take 5 mg by mouth nightly      alirocumab (PRALUENT) 75 MG/ML SOAJ injection pen Inject 1 mL into the skin every 14 days 6 pen 3    DULoxetine (CYMBALTA) 60 MG extended release capsule Take 1 capsule by mouth daily 30 capsule 1    oxyCODONE-acetaminophen (PERCOCET) 7.5-325 MG per tablet Take 1 tablet by mouth every 6 hours as needed for Pain (max 3-4 day) for up to 28 days.  84 tablet 0    rizatriptan (MAXALT) 10 MG tablet Take 1 tablet by mouth once as needed for Migraine May repeat in 2 hours if needed 9 tablet 0    Erenumab-aooe (AIMOVIG) 70 MG/ML SOAJ INJECT 140 MLS INTO THE SKIN EVERY 30 DAYS 1 mL 1    QUEtiapine (SEROQUEL) 25 MG tablet Take 1-2 tablets by mouth nightly 60 tablet 1    insulin aspart (NOVOLOG FLEXPEN) 100 UNIT/ML injection pen Inject 3 Units into the skin 3 times daily (before meals)      torsemide (DEMADEX) 10 MG tablet Take 10 mg by mouth daily      tiZANidine (ZANAFLEX) 4 MG tablet Take 4 mg by mouth daily as needed      divalproex (DEPAKOTE ER) 250 MG extended release tablet Take 250 mg by mouth 2 times daily       omeprazole (PRILOSEC) 20 MG delayed release capsule TAKE 1 CAPSULE BY MOUTH DAILY 30 capsule 1    insulin glargine (BASAGLAR KWIKPEN) 100 UNIT/ML injection pen Inject 8 Units into the skin nightly 5 pen 3    metoprolol succinate (TOPROL XL) 50 MG extended release tablet TAKE 0.5 TABLETS BY MOUTH 2 TIMES DAILY (WITH MEALS) (Patient taking differently: Take 25 mg by mouth daily ) 30 tablet 5    aspirin 81 MG chewable tablet Take 1 tablet by mouth daily 30 tablet 3    dicyclomine (BENTYL) 20 MG tablet Take 20 mg by mouth 4 times daily (before meals and nightly)       nitroGLYCERIN (NITROSTAT) 0.4 MG SL tablet Place 1 tablet under the tongue every 5 minutes as needed for Chest pain up to max of 3 total doses. If no relief after 1 dose, call 911. 25 tablet 3    Nutritional Supplements (ENSURE) LIQD Take 1 Can by mouth 3 times daily as needed (nutrition) 90 Can 5    hydrALAZINE (APRESOLINE) 50 MG tablet Take 1 tablet by mouth 2 times daily 60 tablet 5    atorvastatin (LIPITOR) 80 MG tablet Take 1 tablet by mouth nightly 90 tablet 5    amLODIPine (NORVASC) 5 MG tablet Take 1 tablet by mouth 2 times daily 180 tablet 5    ondansetron (ZOFRAN) 4 MG tablet Take 1 tablet by mouth 3 times daily as needed for Nausea or Vomiting 30 tablet 0    clopidogrel (PLAVIX) 75 MG tablet TAKE 1 TABLET BY MOUTH DA JULIEN 90 tablet 5    albuterol (PROVENTIL) (2.5 MG/3ML) 0.083% nebulizer solution Take 3 mLs by nebulization every 4 hours as needed for Wheezing 120 each 5    budesonide-formoterol (SYMBICORT) 160-4.5 MCG/ACT AERO Inhale 2 puffs into the lungs daily 2 Inhaler 5    albuterol sulfate HFA (VENTOLIN HFA) 108 (90 Base) MCG/ACT inhaler Inhale 2 puffs into the lungs every 4 hours as needed for Wheezing or Shortness of Breath 3 Inhaler 5    ranolazine (RANEXA) 1000 MG extended release tablet Take 1 tablet by mouth 2 times daily 60 tablet 8    lidocaine (XYLOCAINE) 5 % ointment Apply topically as needed. (Patient not taking: Reported on 4/1/2021) 5 g 3    blood glucose test strips (TRUE METRIX BLOOD GLUCOSE TEST) strip 1 each by Does not apply route 2 times daily 100 each 5       Allergies:  Patient has no known allergies. Social History:    Social History     Socioeconomic History    Marital status:   Spouse name: Not on file    Number of children: 6    Years of education: Not on file    Highest education level: Not on file   Occupational History    Not on file   Social Needs    Financial resource strain: Not on file    Food insecurity     Worry: Not on file     Inability: Not on file    Transportation needs     Medical: Not on file     Non-medical: Not on file   Tobacco Use    Smoking status: Former Smoker     Packs/day: 0.50     Years: 35.00     Pack years: 17.50     Types: Cigarettes     Quit date: 2018     Years since quittin.7    Smokeless tobacco: Former User    Tobacco comment: 5/13/15 has not smoked since hospitalization -    Substance and Sexual Activity    Alcohol use: No     Alcohol/week: 0.0 standard drinks    Drug use: No    Sexual activity: Yes     Partners: Male   Lifestyle    Physical activity     Days per week: Not on file     Minutes per session: Not on file    Stress: Not on file   Relationships    Social connections     Talks on phone: Not on file     Gets together: Not on file     Attends Anabaptism service: Not on file     Active member of club or organization: Not on file     Attends meetings of clubs or organizations: Not on file     Relationship status: Not on file    Intimate partner violence     Fear of current or ex partner: Not on file     Emotionally abused: Not on file     Physically abused: Not on file     Forced sexual activity: Not on file   Other Topics Concern    Not on file   Social History Narrative    Not on file       Family History:       Problem Relation Age of Onset    Diabetes Mother     Hypertension Mother     High Cholesterol Mother     Stroke Mother     Cancer Mother     No Known Problems Paternal Grandfather         lung issues          Review of Systems:  a comprehensive Review of systems was negative except for the following: A 12 point ROS is reviewed and is negative except as stated in HPI    PHYSICAL EXAM:    Vitals:    BP (!) 88/47   Pulse 57   Temp 97.9 °F (36.6 °C) (Oral)   Resp 16   Ht 5' (1.524 m)   Wt 125 lb (56.7 kg)   SpO2 96%   BMI 24.41 kg/m²   I/O last 3 completed shifts: In: 611.3 [I.V.:611.3]  Out: -   I/O this shift: In: 0 [P.O.:590]  Out: -     Physical Exam:  Gen: Resting in bed, NAD. HEENT: MMM, OP clear. Anicteric, NC/AT   CV: +S1/S2, RRR  Lungs: Good respiratory effort, clear air entry   Abd: S/NT +BS  Ext: No edema, no cyanosis  Right hip tenderness  Skin: Warm. No rashes appreciated. : No TTP over bladder, nondistended. Neuro: Alert and oriented x 3, nonfocal.  Joints: No erythema noted over joints. DATA:    CBC:   Lab Results   Component Value Date    WBC 3.8 04/02/2021    RBC 3.09 04/02/2021    HGB 9.9 04/02/2021    HCT 30.4 04/02/2021    MCV 98.4 04/02/2021    MCH 32.0 04/02/2021    MCHC 32.5 04/02/2021    RDW 15.2 04/02/2021     04/02/2021    MPV 8.3 04/02/2021     BMP:    Lab Results   Component Value Date     04/02/2021    K 4.4 04/02/2021     04/02/2021    CO2 21 04/02/2021    BUN 30 04/02/2021    LABALBU 4.8 04/01/2021    CREATININE 1.6 04/02/2021    CALCIUM 8.4 04/02/2021    GFRAA 39 04/02/2021    GFRAA 60 05/23/2013    LABGLOM 32 04/02/2021    GLUCOSE 150 04/02/2021     Renal US:  RIGHT KIDNEY:  Diffusely echogenic consistent with chronic kidney disease. No hydronephrosis. LEFT KIDNEY: Diffusely echogenic consistent with chronic kidney disease. No hydronephrosis. RIGHT RENAL LENGTH: 8.0 cm  (normal is 9-14cm)   LEFT RENAL LENGTH:  8.9 cm    (normal is 9-14cm)   BLADDER: Unremarkable   OTHER:  None           IMPRESSION:           Small bilateral kidneys, which are diffusely echogenic, which can be seen with chronic renal    disease.  No hydronephrosis, bilaterally.             IMPRESSION/RECOMMENDATIONS:      KOLBY: baseline Scr is variable as low as 1.2 to as high as 1.7-Scr 1/8 at adm-likely hemodynamic in the setting of uncontrolled BP-improved to 1.6  -hypotensive today-started on IVF and BP meds on hold  -check UA/urine lytes/UPC/SPEP/UPEP  -avoid hypotension  -rest of lytes stable    Chest pain/CAD: s/p stents in 11/20-on ASA/plavix/Ranexa  -per cardiology, dose of metoprolol increased    CKD Stage 3: baseline Scr variable, 1.2-1.7-has not followed up with Dr. Deras Rather of late  -presumed DN/HTN nS  -will check UA and UPC  -further work up based on urinary protein level  -abstain from smoking    HTN: uncontrolled at adm-on multiple mesd including amlodipine, HDLZ, metoprolol  -held today due to hypotension  -resume BP meds once SBP consistently > 140-avoid HDLZ    DM2: on inuslin    Anemia: check iron studies, SPEP/UPEP  -JAROD if iron replete    CKD-MBD: check PTH/phos/vitamin D      Thank you for allowing me to participate in the care of this patient. I will continue to follow along. Please call with questions.     Pierre Pimentel MD

## 2021-04-02 NOTE — PROGRESS NOTES
Occupational Therapy   Occupational Therapy Initial Assessment  Date: 2021   Patient Name: Luis Hilliard  MRN: [id number]     : 1955    Date of Service: 2021    Discharge Recommendations:  Home with assist PRN, Home with Home health OT, Home with nursing aide     Luis Hilliard scored a 21/24 on the  [address] form. Current research shows that an AM-PAC score of 18 or greater is typically associated with a discharge to the patient's home setting. Based on the patient's AM-PAC score, and their current ADL deficits, it is recommended that the patient have 2-3 sessions per week of Occupational Therapy at d/c to increase the patient's independence. At this time, this patient demonstrates the endurance and safety to discharge home with resumed HHA assist and a home OT follow up treatment frequency of 2-3x/wk. Please see assessment section for further patient specific details. If patient discharges prior to next session this note will serve as a discharge summary. Please see below for the latest assessment towards goals. Assessment   Performance deficits / Impairments: Decreased functional mobility ; Decreased ADL status; Decreased sensation;Decreased balance;Decreased endurance  Assessment: Pt is a 60 y.o. female admitted with chest pain, KOLBY on CKD. At baseline, pt lives in senior apartment, has HHA 5 days/week to assists with IADLs and prn ADLs, mod I fxl mobility using cane. Pt currently functioning below baseline, today being limited by dizziness, low BP, and R hip pain affecting pt's balance,fxl mobility, fxl activity tolerance, and ADL status. This date, pt CGA fxl transfers/mobility with 4WW, and anticipate pt would require overall min A for ADLs. Will cont to see on acute to address the above limitations and maximize pt's safety and independence. Anticipate pt will progress to be safe to return home with resumed HHA assist and home OT.   Prognosis: Good  Decision Making: Medium Complexity  History: see above  Exam: self-care, ROM/strength,b alance/fxl mobility, orthostatic vitals  Assistance / Modification: anticipate overall min A for ADLs  OT Education: Plan of Care;OT Role;Transfer Training  REQUIRES OT FOLLOW UP: Yes  Activity Tolerance  Activity Tolerance: Treatment limited secondary to medical complications (free text)  Activity Tolerance: dizziness, low BP  Safety Devices  Safety Devices in place: Yes  Type of devices: Call light within reach; Bed alarm in place;Gait belt;Left in bed           Patient Diagnosis(es): The primary encounter diagnosis was Chest pain, unspecified type. A diagnosis of Acute renal failure, unspecified acute renal failure type [facility]) was also pertinent to this visit. has a past medical history of Acid reflux, Anemia, Anxiety, Arthritis, Asthma, Atrial fibrillation (HCC), Blood transfusion reaction, CAD (coronary artery disease), Cerebral artery occlusion with cerebral infarction ([address] CHF (congestive heart failure) ([address] Chronic kidney disease, COPD (chronic obstructive pulmonary disease) ([address] Depression, DM2 (diabetes mellitus, type 2) ([address] Dysarthria, Fibromyalgia, Headache(784.0), Hemisensory loss, Hyperlipidemia, Hypertension, IBS (irritable bowel syndrome), Inferior vena cava occlusion ([address] Irritable bowel syndrome, Keratitis, MI, old, Neuropathy, Superior vena cava obstruction, and Temporal arteritis ([address] has a past surgical history that includes knee surgery; Tunneled venous port placement; Coronary angioplasty with stent; Cholecystectomy; ablation of dysrhythmic focus; Cataract removal (Bilateral); joint replacement (Right); Hysterectomy; Artery Biopsy (Right, 04/23/2014); Coronary angioplasty with stent; Knee arthroscopy (Right); vascular surgery; Percutaneous Transluminal Coronary Angio (10/2019); and Upper gastrointestinal endoscopy (N/A, 7/6/2020).            Restrictions  Restrictions/Precautions  Restrictions/Precautions: Fall Risk  Position Activity Restriction  Other position/activity restrictions: IV    Subjective   General  Chart Reviewed: Yes  Additional Pertinent Hx: Per Mehdi Wylie MD's H&P: \"60year-old female with past medical history of coronary artery disease status post PCI, diabetes mellitus, CKD stage III, hypertension, mood disorder presented to the hospital with sudden onset of chest pain. Patient states that around 3 PM she was just sitting around watching TV when she started having sudden chest pain which was sharp in intensity and located on the center of her chest.  It was radiating to her left arm and back area. Patient said that she instantly took some sublingual nitroglycerin without any improvement of her symptoms. She also took 4 baby aspirin. Due to the consistent pain she decided to come to the hospital.  On arrival she was noted to be hypertensive. Lab work showed elevated creatinine of 1.8 above her baseline. Initial troponin was negative chest x-ray was unremarkable. Patient was admitted to the hospital for further management\"  Family / Caregiver Present: No  Referring Practitioner: Mehdi Wylie MD  Diagnosis: chest pain, KOLBY on CKD  Subjective  Subjective: Pt met b/s for OT eval/tx with PT. Pt in bed on arrival, agreeable to participate in therapy. Pt c/o dizziness and that she is having issues with low BP. Pt reports headache and R hip pain.     Social/Functional History  Social/Functional History  Lives With: Alone  Type of Home: Apartment(3rd apartment (senior citizen building))  Home Access: Level entry, Elevator  Bathroom Shower/Tub: Walk-in shower  Bathroom Toilet: Standard  Bathroom Equipment: Grab bars in shower, Built-in shower seat, Hand-held shower, Grab bars around toilet  Bathroom Accessibility: Walker accessible  Home Equipment: Baltazar Morrison, [address] chair, Alert [employer] Help From: Home health(aide 5days/weeks, 5 hrs/day)  ADL Assistance: Independent(aide does assist with bathing if fibromyalgia is acting up)  [employer] Assistance: (aide does laundry, cleaning, cooking, groceries; gets MOW)  Ambulation Assistance: Independent(with cane)  Transfer Assistance: Independent(sleeps in regular bed)  Active : No  Patient's  Info: margo takes for appointments or medical transport  Occupation: On disability  Additional Comments: Pt denies recent falls. Objective   Vision: Impaired  Vision Exceptions: Wears glasses for reading(diabetic retinopathy)  Hearing: Within functional limits      Orientation  Overall Orientation Status: Within Functional Limits     Observation/Palpation  Palpation: tender to palpation over the R greater trochanter  Observation: BP supine 88/47; sitting 102/62; standing 89/57     Balance  Sitting Balance: Supervision  Standing Balance: Contact guard assistance  Functional Mobility  Functional - Mobility Device: 4-Wheeled Walker  Assist Level: Contact guard assistance  Functional Mobility Comments: Pt completed fxl mobiliy ~15 ft with 4WW and CGA. Distance limited d/t c/o dizziness and low BP. ADL  Additional Comments: Anticipate pt is independent feeding, min A bathing and dressing, CGA toileting, SBA grooming based on ROM/strength, balance, endurance. Coordination  Movements Are Fluid And Coordinated: Yes     Bed mobility  Supine to Sit: Supervision  Sit to Supine: Supervision  Scooting: Supervision     Transfers  Sit to stand: Contact guard assistance(from EOB and 4WW)  Stand to sit: Contact guard assistance(to EOB and 4WW)  Transfer Comments: to/from 4WW, verbal cues for hand placement and managing brakes     Cognition  Overall Cognitive Status: WFL     Sensation  Overall Sensation Status: Impaired(reports numbness and tingling in B hands and B feet)    LUE AROM (degrees)  LUE AROM : WFL  RUE AROM (degrees)  RUE AROM : WFL     LUE Strength  Gross LUE Strength:  WNL  RUE Strength  Gross RUE Strength: WFL  RUE Strength Comment: pt reports h/o CVA with R-sided weakness, grossly 4/5 throughout                   Plan   Plan  Times per week: 3-5  Current Treatment Recommendations: Balance Training, Functional Mobility Training, Safety Education & Training, Endurance Training, Self-Care / ADL, Equipment Evaluation, Education, & procurement      AM-PAC Score        AM-PAC Inpatient Daily Activity Raw Score: 21 (04/02/21 1100)  AM-PAC Inpatient ADL T-Scale Score : 44.27 (04/02/21 1100)  ADL Inpatient CMS 0-100% Score: 32.79 (04/02/21 1100)  ADL Inpatient CMS G-Code Modifier : Mile Marks (04/02/21 1100)    Goals  Short term goals  Time Frame for Short term goals: Prior to d/c:  Short term goal 1: Pt will dress with supervision. Short term goal 2: Pt will toilet with supervision. Short term goal 3: Pt will bathe with supervision. Short term goal 4: Pt will complete fxl mobility andf fxl transfers to/from ADL surfaces with supervision using LRAD. Short term goal 5: Pt will tolerate standing >5 minutes for functional task with supervision. Long term goals  Time Frame for Long term goals : STGs=LTGs  Patient Goals   Patient goals : to return home.        Therapy Time   Individual Concurrent Group Co-treatment   Time In 1017         Time Out 1100         Minutes 43         Timed Code Treatment Minutes: [address] [facility], OTR/L 3902

## 2021-04-02 NOTE — DISCHARGE SUMMARY
Hospital Medicine Discharge Summary    Patient: Meena Carrillo     Gender: female  : 1955   Age: 60 y.o. MRN: [id number]    Code Status: Full Code     Primary Care Provider: Leila Marc MD    Admit Date: 2021   Discharge Date:  2021    Admitting Physician: Whitney Tiwari MD  Discharge Physician: Angelito Craft DO     Discharge Diagnoses: Active Hospital Problems    Diagnosis Date Noted    [facility]) [facility] [N17.9] 2021       Hospital Course:  29-year-old female with past medical history of coronary artery disease status post PCI, diabetes mellitus, CKD stage III, hypertension, mood disorder admitted with sudden onset of chest pain that started at rest. It was radiating to her left arm and back area.  Patient said that she instantly took some sublingual nitroglycerin without any improvement of her symptoms. Portis Hones also took 4 baby aspirin. On arrival she was noted to be hypertensive.  Lab work showed elevated creatinine of 1.8 above her baseline.  Initial troponin was negative chest x-ray was unremarkable.       Work up completed, and improved with treatment as below. was discharged today in stable condition. Chest pain -resolved  - ECG shows no ST/T abnormalities  - troponins neg x 2  - cont plavix, ASA, statin  - stress test 3/9/2021 was negative for ischemia  - explained that if the evaluation does not show that the symptoms are secondary to ischemic changes, she will be discharged and instructed to follow up with her PCP for continued work up     Right hip pain  - mri of the right hip: No acute bony abnormality identified.  No significant bone marrow edema is   seen. Small volume right greater trochanteric bursal fluid.  Mild bursitis can be   considered. No evidence of an acute tendon injury.  Mild edema in the right gluteus   luz marina and medius muscles could represent a mild strain. Mild bilateral hip joint degenerative changes.    - she needs to cont PT/OT at home     CKD III  - crt at baseline 1.5  - monitor     HTN  - improved, now at her baseline today  - continue toprol xl and amlodipine and hold hydralazine for now     CAD  - stable  - cont ASA, statin, BB     Diabetes  - stable  - cont home meds    Disposition:  Home    Exam: the Pt was discharged before I could get to the room to see her. Please refer to her exam from pn 4/2/2021. BP (!) 91/52   Pulse 54   Temp 97.5 °F (36.4 °C) (Oral)   Resp 17   Ht 5' (1.524 m)   Wt 125 lb (56.7 kg)   SpO2 97%   BMI 24.41 kg/m²       Consults:     IP CONSULT TO HOSPITALIST  IP CONSULT TO NEPHROLOGY    Labs:  For convenience and continuity at follow-up the following most recent labs are provided:    Lab Results   Component Value Date    WBC 3.8 04/02/2021    HGB 9.9 04/02/2021    HCT 30.4 04/02/2021    MCV 98.4 04/02/2021     04/02/2021     04/02/2021    K 4.4 04/02/2021     04/02/2021    CO2 21 04/02/2021    BUN 30 04/02/2021    CREATININE 1.6 04/02/2021    CALCIUM 8.4 04/02/2021    PHOS 4.3 05/12/2020     12/10/2013    ALKPHOS 150 04/01/2021    ALT 36 04/01/2021    AST 45 04/01/2021    BILITOT 0.3 04/01/2021    BILIDIR <0.2 08/22/2019    LABALBU 4.8 04/01/2021    LDLCALC 89 08/29/2019    TRIG 131 08/29/2019     Lab Results   Component Value Date    INR 1.00 04/01/2021    INR 1.02 03/01/2021    INR 0.92 05/11/2020       Radiology:  XR CHEST PORTABLE   Final Result   No acute findings             Discharge Medications:   Current Discharge Medication List        Current Discharge Medication List        Current Discharge Medication List      CONTINUE these medications which have NOT CHANGED    Details   !! metoprolol succinate (TOPROL XL) 50 MG extended release tablet Take 50 mg by mouth nightly      OLANZapine (ZYPREXA) 5 MG tablet Take 5 mg by mouth nightly      alirocumab (PRALUENT) 75 MG/ML SOAJ injection pen Inject 1 mL into the skin every 14 days  Qty: 6 pen, Refills: 3      DULoxetine (CYMBALTA) 60 MG extended release capsule Take 1 capsule by mouth daily  Qty: 30 capsule, Refills: 1    Associated Diagnoses: Chronic pain syndrome; Fibromyalgia      oxyCODONE-acetaminophen (PERCOCET) 7.5-325 MG per tablet Take 1 tablet by mouth every 6 hours as needed for Pain (max 3-4 day) for up to 28 days. Qty: 84 tablet, Refills: 0    Comments: Reduce doses taken as pain becomes manageable  Associated Diagnoses: Chronic pain syndrome; Chronic painful diabetic neuropathy (HCC); DDD (degenerative disc disease), cervical; DDD (degenerative disc disease), lumbar; Fibromyalgia;  Rotator cuff tendonitis, right; Tendinitis of right rotator cuff      rizatriptan (MAXALT) 10 MG tablet Take 1 tablet by mouth once as needed for Migraine May repeat in 2 hours if needed  Qty: 9 tablet, Refills: 0      Erenumab-aooe (AIMOVIG) 70 MG/ML SOAJ INJECT 140 MLS INTO THE SKIN EVERY 30 DAYS  Qty: 1 mL, Refills: 1    Associated Diagnoses: Chronic pain syndrome      QUEtiapine (SEROQUEL) 25 MG tablet Take 1-2 tablets by mouth nightly  Qty: 60 tablet, Refills: 1    Associated Diagnoses: Chronic pain syndrome      insulin aspart (NOVOLOG FLEXPEN) 100 UNIT/ML injection pen Inject 3 Units into the skin 3 times daily (before meals)      torsemide (DEMADEX) 10 MG tablet Take 10 mg by mouth daily      tiZANidine (ZANAFLEX) 4 MG tablet Take 4 mg by mouth daily as needed      divalproex (DEPAKOTE ER) 250 MG extended release tablet Take 250 mg by mouth 2 times daily       omeprazole (PRILOSEC) 20 MG delayed release capsule TAKE 1 CAPSULE BY MOUTH DAILY  Qty: 30 capsule, Refills: 1    Associated Diagnoses: Essential hypertension      insulin glargine (BASAGLAR KWIKPEN) 100 UNIT/ML injection pen Inject 8 Units into the skin nightly  Qty: 5 pen, Refills: 3      !! metoprolol succinate (TOPROL XL) 50 MG extended release tablet TAKE 0.5 TABLETS BY MOUTH 2 TIMES DAILY (WITH MEALS)  Qty: 30 tablet, Refills: 5    Associated Diagnoses: Essential hypertension; Coronary artery disease involving native coronary artery of native heart without angina pectoris      aspirin 81 MG chewable tablet Take 1 tablet by mouth daily  Qty: 30 tablet, Refills: 3      dicyclomine (BENTYL) 20 MG tablet Take 20 mg by mouth 4 times daily (before meals and nightly)       nitroGLYCERIN (NITROSTAT) 0.4 MG SL tablet Place 1 tablet under the tongue every 5 minutes as needed for Chest pain up to max of 3 total doses. If no relief after 1 dose, call 911. Qty: 25 tablet, Refills: 3    Associated Diagnoses: Coronary artery disease involving native coronary artery of native heart without angina pectoris      Nutritional Supplements (ENSURE) LIQD Take 1 Can by mouth 3 times daily as needed (nutrition)  Qty: 90 Can, Refills: 5      hydrALAZINE (APRESOLINE) 50 MG tablet Take 1 tablet by mouth 2 times daily  Qty: 60 tablet, Refills: 5    Associated Diagnoses: Essential hypertension      atorvastatin (LIPITOR) 80 MG tablet Take 1 tablet by mouth nightly  Qty: 90 tablet, Refills: 5    Associated Diagnoses: Coronary artery disease involving native coronary artery of native heart without angina pectoris;  Uncontrolled type 2 diabetes mellitus with complication, with long-term current use of insulin ([facility])      amLODIPine (NORVASC) 5 MG tablet Take 1 tablet by mouth 2 times daily  Qty: 180 tablet, Refills: 5    Associated Diagnoses: Essential hypertension      ondansetron (ZOFRAN) 4 MG tablet Take 1 tablet by mouth 3 times daily as needed for Nausea or Vomiting  Qty: 30 tablet, Refills: 0      clopidogrel (PLAVIX) 75 MG tablet TAKE 1 TABLET BY MOUTH DA JULIEN  Qty: 90 tablet, Refills: 5    Associated Diagnoses: Coronary artery disease involving native coronary artery of native heart without angina pectoris      albuterol (PROVENTIL) (2.5 MG/3ML) 0.083% nebulizer solution Take 3 mLs by nebulization every 4 hours as needed for Wheezing  Qty: 120 each, Refills: 5    Associated Diagnoses: COPD with acute bronchitis (HCC)      budesonide-formoterol (SYMBICORT) 160-4.5 MCG/ACT AERO Inhale 2 puffs into the lungs daily  Qty: 2 Inhaler, Refills: 5    Associated Diagnoses: COPD with acute bronchitis (HCC)      albuterol sulfate HFA (VENTOLIN HFA) 108 (90 Base) MCG/ACT inhaler Inhale 2 puffs into the lungs every 4 hours as needed for Wheezing or Shortness of Breath  Qty: 3 Inhaler, Refills: 5    Associated Diagnoses: COPD with acute bronchitis (HCC)      ranolazine (RANEXA) 1000 MG extended release tablet Take 1 tablet by mouth 2 times daily  Qty: 60 tablet, Refills: 8    Associated Diagnoses: Coronary artery disease involving native coronary artery of native heart without angina pectoris      lidocaine (XYLOCAINE) 5 % ointment Apply topically as needed. Qty: 5 g, Refills: 3    Associated Diagnoses: Fall, initial encounter      blood glucose test strips (TRUE METRIX BLOOD GLUCOSE TEST) strip 1 each by Does not apply route 2 times daily  Qty: 100 each, Refills: 5    Associated Diagnoses: Uncontrolled type 2 diabetes mellitus with complication, with long-term current use of insulin ([facility]       ! ! - Potential duplicate medications found. Please discuss with provider. Current Discharge Medication List          Follow-up appointments:  one week    Provider Follow-up:    pcp    Condition at Discharge:  Stable    The patient was seen and examined on day of discharge and this discharge summary is in conjunction with any daily progress note from day of discharge. Time Spent on discharge is 45 minutes  in the examination, evaluation, counseling and review of medications and discharge plan. Signed:    Amy Esqueda DO   4/2/2021      Thank you Bonnie Mcclellan MD for the opportunity to be involved in this patient's care. If you have any questions or concerns please feel free to contact me at [phone].

## 2021-04-03 VITALS
DIASTOLIC BLOOD PRESSURE: 70 MMHG | HEIGHT: 60 IN | BODY MASS INDEX: 24.58 KG/M2 | TEMPERATURE: 98.1 F | SYSTOLIC BLOOD PRESSURE: 122 MMHG | RESPIRATION RATE: 14 BRPM | HEART RATE: 64 BPM | WEIGHT: 125.22 LBS | OXYGEN SATURATION: 99 %

## 2021-04-03 DIAGNOSIS — I10 ESSENTIAL HYPERTENSION: ICD-10-CM

## 2021-04-03 PROBLEM — E87.20 METABOLIC ACIDOSIS: Status: RESOLVED | Noted: 2019-08-23 | Resolved: 2021-04-03

## 2021-04-03 PROBLEM — J96.01 ACUTE RESPIRATORY FAILURE WITH HYPOXIA (HCC): Status: RESOLVED | Noted: 2019-07-29 | Resolved: 2021-04-03

## 2021-04-03 PROBLEM — J44.1 COPD EXACERBATION (HCC): Status: RESOLVED | Noted: 2017-11-25 | Resolved: 2021-04-03

## 2021-04-03 PROBLEM — A41.9 SEPSIS (HCC): Status: RESOLVED | Noted: 2019-07-28 | Resolved: 2021-04-03

## 2021-04-03 PROBLEM — R06.09 DYSPNEA ON EXERTION: Chronic | Status: RESOLVED | Noted: 2019-01-11 | Resolved: 2021-04-03

## 2021-04-03 LAB
ANION GAP SERPL CALCULATED.3IONS-SCNC: 8 MMOL/L (ref 3–16)
BUN BLDV-MCNC: 26 MG/DL (ref 7–20)
CALCIUM SERPL-MCNC: 8.8 MG/DL (ref 8.3–10.6)
CHLORIDE BLD-SCNC: 107 MMOL/L (ref 99–110)
CO2: 23 MMOL/L (ref 21–32)
CREAT SERPL-MCNC: 1.5 MG/DL (ref 0.6–1.2)
GFR AFRICAN AMERICAN: 42
GFR NON-AFRICAN AMERICAN: 35
GLUCOSE BLD-MCNC: 112 MG/DL (ref 70–99)
GLUCOSE BLD-MCNC: 130 MG/DL (ref 70–99)
PERFORMED ON: ABNORMAL
POTASSIUM REFLEX MAGNESIUM: 4.5 MMOL/L (ref 3.5–5.1)
SODIUM BLD-SCNC: 138 MMOL/L (ref 136–145)

## 2021-04-03 PROCEDURE — 96372 THER/PROPH/DIAG INJ SC/IM: CPT

## 2021-04-03 PROCEDURE — 6370000000 HC RX 637 (ALT 250 FOR IP): Performed by: INTERNAL MEDICINE

## 2021-04-03 PROCEDURE — 2580000003 HC RX 258: Performed by: INTERNAL MEDICINE

## 2021-04-03 PROCEDURE — 36415 COLL VENOUS BLD VENIPUNCTURE: CPT

## 2021-04-03 PROCEDURE — 6360000002 HC RX W HCPCS: Performed by: INTERNAL MEDICINE

## 2021-04-03 PROCEDURE — 80048 BASIC METABOLIC PNL TOTAL CA: CPT

## 2021-04-03 PROCEDURE — G0378 HOSPITAL OBSERVATION PER HR: HCPCS

## 2021-04-03 RX ORDER — QUETIAPINE FUMARATE 25 MG/1
25 TABLET, FILM COATED ORAL NIGHTLY
Qty: 60 TABLET | Refills: 3 | Status: SHIPPED | OUTPATIENT
Start: 2021-04-03 | End: 2021-04-16 | Stop reason: SDUPTHER

## 2021-04-03 RX ADMIN — ENOXAPARIN SODIUM 30 MG: 30 INJECTION SUBCUTANEOUS at 08:39

## 2021-04-03 RX ADMIN — DICYCLOMINE HYDROCHLORIDE 20 MG: 20 TABLET ORAL at 11:17

## 2021-04-03 RX ADMIN — DIVALPROEX SODIUM 250 MG: 250 TABLET, EXTENDED RELEASE ORAL at 08:39

## 2021-04-03 RX ADMIN — RANOLAZINE 1000 MG: 500 TABLET, FILM COATED, EXTENDED RELEASE ORAL at 08:39

## 2021-04-03 RX ADMIN — ASPIRIN 81 MG: 81 TABLET, CHEWABLE ORAL at 08:39

## 2021-04-03 RX ADMIN — CLOPIDOGREL BISULFATE 75 MG: 75 TABLET ORAL at 08:39

## 2021-04-03 RX ADMIN — PROMETHAZINE HYDROCHLORIDE 12.5 MG: 25 TABLET ORAL at 06:45

## 2021-04-03 RX ADMIN — DICYCLOMINE HYDROCHLORIDE 20 MG: 20 TABLET ORAL at 05:59

## 2021-04-03 RX ADMIN — Medication 10 ML: at 08:40

## 2021-04-03 RX ADMIN — PANTOPRAZOLE SODIUM 40 MG: 40 TABLET, DELAYED RELEASE ORAL at 05:59

## 2021-04-03 RX ADMIN — DULOXETINE HYDROCHLORIDE 60 MG: 60 CAPSULE, DELAYED RELEASE ORAL at 09:00

## 2021-04-03 RX ADMIN — OXYCODONE HYDROCHLORIDE AND ACETAMINOPHEN 1 TABLET: 7.5; 325 TABLET ORAL at 11:17

## 2021-04-03 ASSESSMENT — PAIN SCALES - GENERAL: PAINLEVEL_OUTOF10: 6

## 2021-04-03 NOTE — PLAN OF CARE
Problem: Falls - Risk of:  Goal: Will remain free from falls  Description: Will remain free from falls  4/3/2021 1052 by Jake Zabala RN  Outcome: Ongoing  4/3/2021 0034 by Elena Ryan RN  Outcome: Ongoing  Goal: Absence of physical injury  Description: Absence of physical injury  4/3/2021 1052 by Jake Zabala RN  Outcome: Ongoing  4/3/2021 0034 by Elena Ryan RN  Outcome: Ongoing     Problem: Pain:  Goal: Pain level will decrease  Description: Pain level will decrease  4/3/2021 1052 by Jake Zabala RN  Outcome: Ongoing  4/3/2021 0034 by Elena Ryan RN  Outcome: Ongoing  Goal: Control of acute pain  Description: Control of acute pain  4/3/2021 1052 by Jake Zabala RN  Outcome: Ongoing  4/3/2021 0034 by Elena Ryan RN  Outcome: Ongoing  Goal: Control of chronic pain  Description: Control of chronic pain  4/3/2021 1052 by Jake Zabala RN  Outcome: Ongoing  4/3/2021 0034 by Elena Ryan RN  Outcome: Ongoing     Problem: OXYGENATION/RESPIRATORY FUNCTION  Goal: Patient will maintain patent airway  4/3/2021 1052 by Jake Zabala RN  Outcome: Ongoing  4/3/2021 0034 by Elena Ryan RN  Outcome: Ongoing  Goal: Patient will achieve/maintain normal respiratory rate/effort  Description: Respiratory rate and effort will be within normal limits for the patient  4/3/2021 1052 by Jake Zabala RN  Outcome: Ongoing  4/3/2021 0034 by Elena Ryan RN  Outcome: Ongoing     Problem: HEMODYNAMIC STATUS  Goal: Patient has stable vital signs and fluid balance  4/3/2021 1052 by Jake Zabala RN  Outcome: Ongoing  4/3/2021 0034 by Elena Ryan RN  Outcome: Ongoing     Problem: FLUID AND ELECTROLYTE IMBALANCE  Goal: Fluid and electrolyte balance are achieved/maintained  4/3/2021 1052 by Jake Zabala RN  Outcome: Ongoing  4/3/2021 0034 by Elena Ryan RN  Outcome: Ongoing     Problem: ACTIVITY INTOLERANCE/IMPAIRED MOBILITY  Goal: Mobility/activity is maintained at optimum level for patient  4/3/2021 1052 by Eren Xiong RN  Outcome: Ongoing  4/3/2021 0034 by Guy Munguia RN  Outcome: Ongoing

## 2021-04-03 NOTE — CARE COORDINATION
CASE MANAGEMENT DISCHARGE SUMMARY:    DISCHARGE DATE: 4/3/2021    DISCHARGED TO:    HOME CARE AGENCY:      Discharging to Facility/ Agency   · Name:  [facility]    · Address: [address] [place] [address]., [address] [place] [address]., [address]  · Phone: [phone]  · Fax: [phone]    TRANSPORTATION: Family to transport            Met with patient who confirms Isaías Schilling with [facility], patient has history with [facility]. Call to Erika [facility] with [facility] who confirms referral and will pull information from chart. The Plan for Transition of Care is related to the following treatment goals: independent at home    The Patient was provided with a choice of provider and agrees   with the discharge plan. [x] Yes [] No    Freedom of choice list was provided with basic dialogue that supports the patient's individualized plan of care/goals, treatment preferences and shares the quality data associated with the providers.  [x] Yes [] No      Liliana CUELLO, LISW-S, Social Work  [phone]    Electronically signed by CUATE Nevarez, ZOYAW on 4/3/2021 at 9:58 AM

## 2021-04-03 NOTE — DISCHARGE INSTR - COC
Continuity of Care Form    Patient Name: Barron Katz   :  1955  MRN:  [id number]    Admit date:  2021  Discharge date:  ***    Code Status Order: Full Code   Advance Directives:   [facility] Documentation     Date/Time Healthcare Directive Type of Healthcare Directive Copy in [address] Agent's Name Healthcare Agent's Phone Number    21 [phone]  Other (Comment) in chart  Living will  Yes, copy in chart  Next of kin  Brad Martinez  [phone]          Admitting Physician:  Coy Perez MD  PCP: Ashley Belle MD    Discharging Nurse: [facility] Unit/Room#: R5L-9500/4413-48  Discharging Unit Phone Number: ***    Emergency Contact:   Extended Emergency Contact Information  Primary Emergency Contact: Bindu Subramanian [address] Phone: [phone]  Mobile Phone: [phone]  Relation: Child  Secondary Emergency Contact: Nadege Pascual  Address: Agustin Haley [address] Phone: [phone]  Mobile Phone: [phone]  Relation: Child    Past Surgical History:  Past Surgical History:   Procedure Laterality Date    ABLATION OF DYSRHYTHMIC FOCUS      ARTERY BIOPSY Right 2014    RIGHT TEMPORAL ARTERY BIOPSY    CATARACT REMOVAL Bilateral     CHOLECYSTECTOMY      CORONARY ANGIOPLASTY WITH STENT PLACEMENT      CORONARY ANGIOPLASTY WITH STENT PLACEMENT      HYSTERECTOMY      JOINT REPLACEMENT Right     KNEE ARTHROSCOPY Right     KNEE SURGERY      right knee replacement    PTCA  10/2019    LAD and RCA inrtervention    TUNNELED VENOUS PORT PLACEMENT      left thigh.   SMART PORT    UPPER GASTROINTESTINAL ENDOSCOPY N/A 2020    EGD DIAGNOSTIC ONLY performed by Pee Correia MD at [facility] [address]         Immunization History:   Immunization History   Administered Date(s) Administered    Influenza Vaccine, unspecified formulation 2009, 10/08/2010    Influenza Virus Vaccine 10/08/2010, 12/11/2013, 09/30/2014    Influenza, Intradermal, Preservative free 10/04/2012    Influenza, Intradermal, Quadrivalent, Preservative Free 11/07/2017    Influenza, Quadv, IM, PF (6 mo and older Fluzone, Flulaval, Fluarix, and 3 yrs and older Afluria) 09/28/2016, 09/14/2018    Influenza, Eveleen Blakes, adjuvanted, 65 yrs +, IM, PF (Fluad) 10/01/2020    Influenza, Triv, inactivated, subunit, adjuvanted, IM (Fluad 65 yrs and older) 09/24/2019    Pneumococcal Polysaccharide ([id number]) 12/12/2013    Tdap (Boostrix, Adacel) 11/17/2009, 10/28/2016       Active Problems:  Patient Active Problem List   Diagnosis Code    HTN (hypertension) I10    Hyperlipidemia E78.5    CAD (coronary artery disease) I25.10    Palpitation R00.2    PVC (premature ventricular contraction) I49.3    Depression F32.9    Migraine with aura, intractable G43.119    Hemisensory loss R20.0    PTSD (post-traumatic stress disorder) F43.10    Insomnia G47.00    Snoring R06.83    Atypical chest pain R07.89    Dysarthria R47.1    Port-A-Cath in place Z95.828    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    Cataract H26.9    Benign essential tremor G25.0    Tobacco use Z72.0    Chronic diastolic CHF (congestive heart failure), NYHA class 2 ([facility]) I50.32    COPD (chronic obstructive pulmonary disease) ([facility]) J44.9    NSTEMI (non-ST elevated myocardial infarction) ([facility]) I21.4    Rotator cuff tendonitis M75.80    Essential hypertension I10    Fibromyalgia M79.7    Chronic pain syndrome G89.4    Headache, chronic migraine without aura, intractable, with status G43.711    Type 2 diabetes mellitus with diabetic chronic kidney disease ([facility]) E11.22    S/P right coronary artery (RCA) stent placement Z95.5    Irritable bowel syndrome K58.9    Giant cell arteritis ([facility]) M31.6    Gastro-esophageal reflux disease without esophagitis K21.9    A-fib ([facility]) I48.91    Coronary artery disease involving native coronary artery of native heart with angina pectoris ([facility]) I25.119    DM (diabetes mellitus), type 2, uncontrolled ([facility] E11.65    Right knee pain M25.561    Chronic painful diabetic neuropathy ([facility]) E11.40    CAD in native artery I25.10    Unstable angina ([facility]) I20.0    Generalized weakness R53.1    Reactive airway disease with wheezing with acute exacerbation J45. 0    Headache R51.9    DMII (diabetes mellitus, type 2) ([facility]) E11.9    Acute-on-chronic renal failure (HCC) N17.9, N18.9    Age-related nuclear cataract, bilateral H25.13    Chronic prescription opiate use Z79.891    Coronary stent restenosis due to progression of disease T82.855A, I25.10    Current moderate episode of major depressive disorder ([facility] F32.1    Diabetic retinopathy of both eyes without macular edema associated with type 2 diabetes mellitus ([facility]) E11.319    Hypertensive heart and chronic kidney disease with heart failure and stage 1 through stage 4 chronic kidney disease, or unspecified chronic kidney disease (HCC) I13.0    Hypertensive retinopathy, bilateral H35.033    Ischemic cardiomyopathy I25.5    Moderate episode of recurrent major depressive disorder ([facility]) F33.1    Other age-related incipient cataract, bilateral H25.093    Presence of intraocular lens Z96.1    Punctate keratitis, bilateral H16.143    Regular astigmatism, bilateral H52.223    Palliative care encounter Z51.5    CKD (chronic kidney disease) stage 3, GFR 30-59 ml/min N18.30    Compression of brain ([facility]) G93.5    Thrombosis of superior vena cava, 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 109    Angina at rest [facility]) I20.8    Chest pain R07.9    KOLBY (acute kidney injury) ([address] N17.9       Isolation/Infection:   Isolation          No Isolation        Patient Infection Status Infection Onset Added Last Indicated Last Indicated By Review Planned Expiration Resolved Resolved By    None active    Resolved    COVID-19 Rule Out 07/06/20 07/06/20 07/06/20 COVID-19 (Ordered)   07/06/20 Rule-Out Test Resulted          Nurse Assessment:  Last Vital Signs: BP (!) 127/58   Pulse 66   Temp 97.9 °F (36.6 °C) (Oral)   Resp 15   Ht 5' (1.524 m)   Wt 125 lb 3.5 oz (56.8 kg)   SpO2 98%   BMI 24.46 kg/m²     Last documented pain score (0-10 scale): Pain Level: 6  Last Weight:   Wt Readings from Last 1 Encounters:   04/03/21 125 lb 3.5 oz (56.8 kg)     Mental Status:  {IP PT MENTAL STATUS:20030}    IV Access:  { RONNIE IV ACCESS:199689849}    Nursing Mobility/ADLs:  Walking   {P DME VHME:665060862}  Transfer  {P DME RUNX:446478965}  Bathing  {P DME XYVI:777868054}  Dressing  {CHP DME QXWH:743859389}  Toileting  {P DME FIIW:323259571}  Feeding  {[facility] DME SSOF:565769118}  Med Admin  {[facility] DME EJEK:423432854}  Med Delivery   { RONNIE MED Delivery:566801768}    Wound Care Documentation and Therapy:        Elimination:  Continence:   · Bowel: {YES / SB:77033}  · Bladder: {YES / LT:44975}  Urinary Catheter: {Urinary Catheter:127235189}   Colostomy/Ileostomy/Ileal Conduit: {YES / [place]:22711}       Date of Last BM: ***    Intake/Output Summary (Last 24 hours) at 4/3/2021 0954  Last data filed at 4/2/2021 2057  Gross per 24 hour   Intake 250 ml   Output --   Net 250 ml     I/O last 3 completed shifts:   In: 600 [P.O.:590; I.V.:10]  Out: -     Safety Concerns:     [address] Safety Concerns:369434187}    Impairments/Disabilities:      [address] Impairments/Disabilities:843832959}    Nutrition Therapy:  Current Nutrition Therapy:   [address] Diet List:934962632}    Routes of Feeding: {CHP DME Other Feedings:319062354}  Liquids: {Slp liquid thickness:02612}  Daily Fluid Restriction: {CHP DME Yes amt example:851091671}  Last Modified Barium Swallow with Video (Video Swallowing Test): {Done Not Done KDWF:002857552}    Treatments at the Time of Hospital Discharge:   Respiratory Treatments: ***  Oxygen Therapy:  {Therapy; copd oxygen:72934}  Ventilator:    { CC Vent GFLJ:636646822}    Rehab Therapies: {THERAPEUTIC INTERVENTION:8096467919}  Weight Bearing Status/Restrictions: { CC Weight Bearin}  Other Medical Equipment (for information only, NOT a DME order):  {EQUIPMENT:132795789}  Other Treatments: ***    Patient's personal belongings (please select all that are sent with patient):  {CHP DME Belongings:402025627}    RN SIGNATURE:  {Esignature:272651871}    CASE MANAGEMENT/SOCIAL WORK SECTION    Inpatient Status Date: 2021    Readmission Risk Assessment Score:  Readmission Risk              Risk of Unplanned Readmission:        54           Discharging to Facility/ Agency   · Name:  [facility]    · Address: [address] [place] [address], [address] [place] [address], [place] [address]  · Phone: [phone]  · Fax: [phone]    / signature: Electronically signed by CUATE Blanton LSW on 4/3/21 at 9:56 AM EDT    PHYSICIAN SECTION    Prognosis: {Prognosis:7175381717}    Condition at Discharge: [facility] Patient Condition:370658504}    Rehab Potential (if transferring to Rehab): {Prognosis:0333567416}    Recommended Labs or Other Treatments After Discharge: ***    Physician Certification: I certify the above information and transfer of Noah Garcia  is necessary for the continuing treatment of the diagnosis listed and that she requires {Admit to Appropriate Level of Care:38756} for {GREATER/LESS:116143115} 30 days.      Update Admission H&P: {CHP DME Changes in BQJWT:823541261}    PHYSICIAN SIGNATURE:  {Esignature:321285835}

## 2021-04-03 NOTE — PROGRESS NOTES
04/02/2021    GLUCOSE 150 04/02/2021     Renal US:  RIGHT KIDNEY:  Diffusely echogenic consistent with chronic kidney disease. No hydronephrosis. LEFT KIDNEY: Diffusely echogenic consistent with chronic kidney disease. No hydronephrosis. RIGHT RENAL LENGTH: 8.0 cm  (normal is 9-14cm)   LEFT RENAL LENGTH:  8.9 cm    (normal is 9-14cm)   BLADDER: Unremarkable   OTHER:  None           IMPRESSION:           Small bilateral kidneys, which are diffusely echogenic, which can be seen with chronic renal    disease. No hydronephrosis, bilaterally.             IMPRESSION/RECOMMENDATIONS:      KOLBY: baseline Scr is variable as low as 1.2 to as high as 1.7-Scr 1/8 at adm-likely hemodynamic in the setting of uncontrolled BP-improved to 1.6  -started on IVF and BP meds on hold  -check UA/urine lytes/UPC/SPEP/UPEP  -avoid hypotension      Chest pain/CAD: s/p stents in 11/20-on ASA/plavix/Ranexa  -per cardiology, dose of metoprolol increased    CKD Stage 3: baseline Scr variable, 1.2-1.7-has not followed up with Dr. Lopes Check of late  -presumed DN/HTN nS        HTN: uncontrolled at adm-on multiple mesd including amlodipine, HDLZ, metoprolol  -held today due to hypotension  -resume BP meds once SBP consistently > 140-avoid HDLZ    DM2: on inuslin    Anemia: check iron studies, SPEP/UPEP  -JAROD if iron replete    CKD-MBD: check PTH/phos/vitamin D      Thank you for allowing me to participate in the care of this patient. I will continue to follow along. Please call with questions.     Nikole Diamond MD

## 2021-04-05 RX ORDER — HYDRALAZINE HYDROCHLORIDE 50 MG/1
50 TABLET, FILM COATED ORAL 2 TIMES DAILY
Qty: 60 TABLET | Refills: 5 | Status: SHIPPED | OUTPATIENT
Start: 2021-04-05 | End: 2021-04-06

## 2021-04-05 NOTE — TELEPHONE ENCOUNTER
Medication:   Requested Prescriptions     Pending Prescriptions Disp Refills    hydrALAZINE (APRESOLINE) 50 MG tablet [Pharmacy Med Name: HYDRALAZINE HCL 50 MG TABS 50 Tablet] 60 tablet 5     Sig: TAKE 1 TABLET BY MOUTH 2 TIMES DAILY        Last Filled:      Patient Phone Number: [phone] (home)     Last appt: 3/1/2021   Next appt: Visit date not found    Last OARRS:   RX Monitoring 4/9/2019   Attestation The Prescription Monitoring Report for this patient was reviewed today.

## 2021-04-06 DIAGNOSIS — G89.4 CHRONIC PAIN SYNDROME: ICD-10-CM

## 2021-04-06 NOTE — PROGRESS NOTES
C-Difficile admission screening and protocol:     * Admitted with diarrhea?no     *Prior history of C-Diff. In last 3 months?no     *Antibiotic use in the past 6-8 weeks?no     *Prior hospitalization or nursing home in the last month? yes   Preoperative Screening for Elective Surgery/Invasive Procedures While COVID-19 present in the community     Have you tested positive or have been told to self-isolate for COVID-19 like symptoms within the past 28 days? no   Do you currently have any of the following symptoms?no  o Fever >100.0 F or 99.9 F in immunocompromised patients? o New onset cough, shortness of breath or difficulty breathing?  o New onset sore throat, myalgia (muscle aches and pains), headache, loss of taste/smell or diarrhea?  Have you had a potential exposure to COVID-19 within the past 14 days by:no  o Close contact with a confirmed case? o Close contact with a healthcare worker,  or essential infrastructure worker (grocery store, [employer], gas station, public utilities or transportation)? o Do you reside in a congregate setting such as; skilled nursing facility, adult home, correctional facility, homeless shelter or other institutional setting?  o Have you had recent travel to a known COVID-19 hotspot? Indicate if the patient has a positive screen by answering yes to one or more of the above questions. Patients who test positive or screen positive prior to surgery or on the day of surgery should be evaluated in conjunction with the surgeon/proceduralist/anesthesiologist to determine the urgency of the procedure. Remember. Jeanine Salinas Safety First! Call before you Fall Remember      [address]  time____1030________        Surgery time_1200___________    Take the following medications with a sip of water: Follow your MD/Surgeons pre-procedure instructions regarding your medications    Do not eat or drink anything after 12:00 midnight contact lenses or glasses, they will be removed, please bring a case for them. If you have a living will and a durable power of  for healthcare, please bring in a copy. As part of our patient safety program to minimize surgical site infections, we ask you to do the following:    · Please notify your surgeon if you develop any illness between         now and the  day of your surgery. · This includes a cough, cold, fever, sore throat, nausea,         or vomiting, and diarrhea, etc.  ·  Please notify your surgeon if you experience dizziness, shortness         of breath or blurred vision between now and the time of your surgery. Do not shave your operative site 96 hours prior to surgery. For face and neck surgery, men may use an electric razor 48 hours   prior to surgery. You may shower the night before surgery or the morning of   your surgery with an antibacterial soap. You will need to bring a photo ID and insurance card    [facility] has an onsite pharmacy, would you like to utilize our pharmacy     If you will be staying overnight and use a C-pap machine, please bring   your C-pap to hospital     Our goal is to provide you with excellent care, therefore, visitors will be limited to two(2) in the room at a time so that we may focus on providing this care for you. Please contact pre-admission testing if you have any further questions. [facility] phone number:  [phone] PAT fax number:  [phone]  Please note these are generalized instructions for all surgical cases, you may be provided with more specific instructions according to your surgery.

## 2021-04-07 ENCOUNTER — TELEPHONE (OUTPATIENT)
Dept: PRIMARY CARE CLINIC | Age: 66
End: 2021-04-07

## 2021-04-07 RX ORDER — TIZANIDINE 4 MG/1
TABLET ORAL
Qty: 60 TABLET | Refills: 0 | Status: SHIPPED | OUTPATIENT
Start: 2021-04-07 | End: 2021-04-16 | Stop reason: SDUPTHER

## 2021-04-08 ENCOUNTER — ANESTHESIA EVENT (OUTPATIENT)
Dept: ENDOSCOPY | Age: 66
End: 2021-04-08
Payer: COMMERCIAL

## 2021-04-09 ENCOUNTER — ANESTHESIA (OUTPATIENT)
Dept: ENDOSCOPY | Age: 66
End: 2021-04-09
Payer: COMMERCIAL

## 2021-04-09 ENCOUNTER — HOSPITAL ENCOUNTER (OUTPATIENT)
Age: 66
Setting detail: OUTPATIENT SURGERY
Discharge: HOME | End: 2021-04-09
Attending: INTERNAL MEDICINE | Admitting: INTERNAL MEDICINE
Payer: COMMERCIAL

## 2021-04-09 VITALS
SYSTOLIC BLOOD PRESSURE: 150 MMHG | HEIGHT: 60 IN | BODY MASS INDEX: 24.54 KG/M2 | OXYGEN SATURATION: 100 % | DIASTOLIC BLOOD PRESSURE: 80 MMHG | TEMPERATURE: 97 F | WEIGHT: 125 LBS | HEART RATE: 66 BPM | RESPIRATION RATE: 18 BRPM

## 2021-04-09 VITALS — OXYGEN SATURATION: 100 % | SYSTOLIC BLOOD PRESSURE: 100 MMHG | DIASTOLIC BLOOD PRESSURE: 54 MMHG

## 2021-04-09 DIAGNOSIS — D64.9 ANEMIA, UNSPECIFIED TYPE: ICD-10-CM

## 2021-04-09 LAB
GLUCOSE BLD-MCNC: 137 MG/DL (ref 70–99)
GLUCOSE BLD-MCNC: 144 MG/DL (ref 70–99)
PERFORMED ON: ABNORMAL
PERFORMED ON: ABNORMAL
SARS-COV-2, NAAT: NOT DETECTED

## 2021-04-09 PROCEDURE — 3700000000 HC ANESTHESIA ATTENDED CARE: Performed by: INTERNAL MEDICINE

## 2021-04-09 PROCEDURE — 88305 TISSUE EXAM BY PATHOLOGIST: CPT

## 2021-04-09 PROCEDURE — 3700000001 HC ADD 15 MINUTES (ANESTHESIA): Performed by: INTERNAL MEDICINE

## 2021-04-09 PROCEDURE — 2580000003 HC RX 258: Performed by: ANESTHESIOLOGY

## 2021-04-09 PROCEDURE — 7100000000 HC PACU RECOVERY - FIRST 15 MIN: Performed by: INTERNAL MEDICINE

## 2021-04-09 PROCEDURE — 2709999900 HC NON-CHARGEABLE SUPPLY: Performed by: INTERNAL MEDICINE

## 2021-04-09 PROCEDURE — 87635 SARS-COV-2 COVID-19 AMP PRB: CPT

## 2021-04-09 PROCEDURE — 2500000003 HC RX 250 WO HCPCS: Performed by: NURSE ANESTHETIST, CERTIFIED REGISTERED

## 2021-04-09 PROCEDURE — 3609010300 HC COLONOSCOPY W/BIOPSY SINGLE/MULTIPLE: Performed by: INTERNAL MEDICINE

## 2021-04-09 PROCEDURE — 7100000001 HC PACU RECOVERY - ADDTL 15 MIN: Performed by: INTERNAL MEDICINE

## 2021-04-09 PROCEDURE — 6360000002 HC RX W HCPCS: Performed by: NURSE ANESTHETIST, CERTIFIED REGISTERED

## 2021-04-09 PROCEDURE — 7100000010 HC PHASE II RECOVERY - FIRST 15 MIN: Performed by: INTERNAL MEDICINE

## 2021-04-09 PROCEDURE — 7100000011 HC PHASE II RECOVERY - ADDTL 15 MIN: Performed by: INTERNAL MEDICINE

## 2021-04-09 RX ORDER — SODIUM CHLORIDE 0.9 % (FLUSH) 0.9 %
10 SYRINGE (ML) INJECTION PRN
Status: DISCONTINUED | OUTPATIENT
Start: 2021-04-09 | End: 2021-04-09 | Stop reason: HOSPADM

## 2021-04-09 RX ORDER — SODIUM CHLORIDE 9 MG/ML
25 INJECTION, SOLUTION INTRAVENOUS PRN
Status: DISCONTINUED | OUTPATIENT
Start: 2021-04-09 | End: 2021-04-09 | Stop reason: HOSPADM

## 2021-04-09 RX ORDER — SODIUM CHLORIDE 0.9 % (FLUSH) 0.9 %
10 SYRINGE (ML) INJECTION EVERY 12 HOURS SCHEDULED
Status: DISCONTINUED | OUTPATIENT
Start: 2021-04-09 | End: 2021-04-09 | Stop reason: HOSPADM

## 2021-04-09 RX ORDER — SODIUM CHLORIDE 9 MG/ML
INJECTION, SOLUTION INTRAVENOUS CONTINUOUS
Status: DISCONTINUED | OUTPATIENT
Start: 2021-04-09 | End: 2021-04-09 | Stop reason: HOSPADM

## 2021-04-09 RX ORDER — PROPOFOL 10 MG/ML
INJECTION, EMULSION INTRAVENOUS CONTINUOUS PRN
Status: DISCONTINUED | OUTPATIENT
Start: 2021-04-09 | End: 2021-04-09 | Stop reason: SDUPTHER

## 2021-04-09 RX ORDER — PROPOFOL 10 MG/ML
INJECTION, EMULSION INTRAVENOUS PRN
Status: DISCONTINUED | OUTPATIENT
Start: 2021-04-09 | End: 2021-04-09 | Stop reason: SDUPTHER

## 2021-04-09 RX ORDER — LIDOCAINE HYDROCHLORIDE 20 MG/ML
INJECTION, SOLUTION EPIDURAL; INFILTRATION; INTRACAUDAL; PERINEURAL PRN
Status: DISCONTINUED | OUTPATIENT
Start: 2021-04-09 | End: 2021-04-09 | Stop reason: SDUPTHER

## 2021-04-09 RX ORDER — ONDANSETRON 2 MG/ML
4 INJECTION INTRAMUSCULAR; INTRAVENOUS
Status: DISCONTINUED | OUTPATIENT
Start: 2021-04-09 | End: 2021-04-09 | Stop reason: HOSPADM

## 2021-04-09 RX ADMIN — PROPOFOL 50 MG: 10 INJECTION, EMULSION INTRAVENOUS at 11:26

## 2021-04-09 RX ADMIN — SODIUM CHLORIDE: 9 INJECTION, SOLUTION INTRAVENOUS at 11:24

## 2021-04-09 RX ADMIN — PROPOFOL 120 MCG/KG/MIN: 10 INJECTION, EMULSION INTRAVENOUS at 11:26

## 2021-04-09 RX ADMIN — LIDOCAINE HYDROCHLORIDE 100 MG: 20 INJECTION, SOLUTION EPIDURAL; INFILTRATION; INTRACAUDAL; PERINEURAL at 11:24

## 2021-04-09 ASSESSMENT — PAIN - FUNCTIONAL ASSESSMENT: PAIN_FUNCTIONAL_ASSESSMENT: 0-10

## 2021-04-09 ASSESSMENT — PULMONARY FUNCTION TESTS
PIF_VALUE: 1

## 2021-04-09 ASSESSMENT — COPD QUESTIONNAIRES: CAT_SEVERITY: MODERATE

## 2021-04-09 ASSESSMENT — PAIN SCALES - GENERAL: PAINLEVEL_OUTOF10: 0

## 2021-04-09 ASSESSMENT — ENCOUNTER SYMPTOMS: SHORTNESS OF BREATH: 1

## 2021-04-09 ASSESSMENT — LIFESTYLE VARIABLES: SMOKING_STATUS: 0

## 2021-04-09 ASSESSMENT — PAIN DESCRIPTION - DESCRIPTORS: DESCRIPTORS: ACHING

## 2021-04-09 NOTE — ANESTHESIA PRE PROCEDURE
[facility] Department of Anesthesiology  Pre-Anesthesia Evaluation/Consultation       Name:  Melody Issa  : 1955  Age:  60 y.o.                                            MRN:  [id number]  Date: 2021           Surgeon: Surgeon(s):  Brittany Hernandes MD    Procedure: Procedure(s):  Colonoscopy      No Known Allergies  Patient Active Problem List   Diagnosis    HTN (hypertension)    Hyperlipidemia    CAD (coronary artery disease)    Palpitation    PVC (premature ventricular contraction)    Depression    Migraine with aura, intractable    Hemisensory loss    PTSD (post-traumatic stress disorder)    Insomnia    Snoring    Atypical chest pain    Dysarthria    Port-A-Cath in place    Inferior vena cava occlusion (HCC)    Cataract    Benign essential tremor    Tobacco use    Chronic diastolic CHF (congestive heart failure), NYHA class 2 ([address]    COPD (chronic obstructive pulmonary disease) ([address]    NSTEMI (non-ST elevated myocardial infarction) ([address]    Rotator cuff tendonitis    Essential hypertension    Fibromyalgia    Chronic pain syndrome    Headache, chronic migraine without aura, intractable, with status    Type 2 diabetes mellitus with diabetic chronic kidney disease ([address]    S/P right coronary artery (RCA) stent placement    Irritable bowel syndrome    Giant cell arteritis ([address]    Gastro-esophageal reflux disease without esophagitis    A-fib ([address]    Coronary artery disease involving native coronary artery of native heart with angina pectoris ([address]    DM (diabetes mellitus), type 2, uncontrolled ([address]    Right knee pain    Chronic painful diabetic neuropathy ([address]    CAD in native artery    Unstable angina (HCC)    Generalized weakness    Reactive airway disease with wheezing with acute exacerbation    Headache    DMII (diabetes mellitus, type 2) ([address]    Acute-on-chronic renal failure (HCC)    Age-related nuclear cataract, bilateral    Chronic  Superior vena cava obstruction     Temporal arteritis ([facility] 2014     Past Surgical History:   Procedure Laterality Date    ABLATION OF DYSRHYTHMIC FOCUS      ARTERY BIOPSY Right 2014    RIGHT TEMPORAL ARTERY BIOPSY    CAROTID ARTERY SURGERY Left     clean up per pt    CATARACT REMOVAL Bilateral     CHOLECYSTECTOMY      CORONARY ANGIOPLASTY WITH STENT PLACEMENT      CORONARY ANGIOPLASTY WITH STENT PLACEMENT      HYSTERECTOMY      JOINT REPLACEMENT Right     KNEE ARTHROSCOPY Right     KNEE SURGERY      right knee replacement    PORT SURGERY      x`s 4 & removal of 4    PTCA  10/2019    LAD and RCA inrtervention    TUNNELED VENOUS PORT PLACEMENT      left thigh.   SMART PORT    UPPER GASTROINTESTINAL ENDOSCOPY N/A 2020    EGD DIAGNOSTIC ONLY performed by Carolin Berry MD at [address] [facility] History     Tobacco Use    Smoking status: Former Smoker     Packs/day: 0.50     Years: 35.00     Pack years: 17.50     Types: Cigarettes     Quit date: 2018     Years since quittin.7    Smokeless tobacco: Never Used    Tobacco comment: 5/13/15 has not smoked since hospitalization - kh   Substance Use Topics    Alcohol use: No     Alcohol/week: 0.0 standard drinks    Drug use: No     Medications  Current Facility-Administered Medications on File Prior to Visit   Medication Dose Route Frequency Provider Last Rate Last Admin    0.9 % sodium chloride infusion   Intravenous Continuous Tish Bermudez MD        sodium chloride flush 0.9 % injection 10 mL  10 mL Intravenous 2 times per day Tish Bermudez MD        sodium chloride flush 0.9 % injection 10 mL  10 mL Intravenous PRN Tish Bermudez MD        0.9 % sodium chloride infusion  25 mL Intravenous PRN Tish Bermudez MD         Current Outpatient Medications on File Prior to Visit   Medication Sig Dispense Refill    tiZANidine (ZANAFLEX) 4 MG tablet Take 1/2-1 tablet po BID 60 tablet 0    QUEtiapine (SEROQUEL) 25 MG tablet Take 1 tablet by mouth nightly 60 tablet 3    alirocumab (PRALUENT) 75 MG/ML SOAJ injection pen Inject 1 mL into the skin every 14 days 6 pen 3    DULoxetine (CYMBALTA) 60 MG extended release capsule Take 1 capsule by mouth daily 30 capsule 1    oxyCODONE-acetaminophen (PERCOCET) 7.5-325 MG per tablet Take 1 tablet by mouth every 6 hours as needed for Pain (max 3-4 day) for up to 28 days. 84 tablet 0    rizatriptan (MAXALT) 10 MG tablet Take 1 tablet by mouth once as needed for Migraine May repeat in 2 hours if needed 9 tablet 0    Erenumab-aooe (AIMOVIG) 70 MG/ML SOAJ INJECT 140 MLS INTO THE SKIN EVERY 30 DAYS 1 mL 1    insulin aspart (NOVOLOG FLEXPEN) 100 UNIT/ML injection pen Inject 3 Units into the skin 3 times daily (before meals)      torsemide (DEMADEX) 10 MG tablet Take 10 mg by mouth daily      tiZANidine (ZANAFLEX) 4 MG tablet Take 4 mg by mouth daily as needed      omeprazole (PRILOSEC) 20 MG delayed release capsule TAKE 1 CAPSULE BY MOUTH DAILY 30 capsule 1    insulin glargine (BASAGLAR KWIKPEN) 100 UNIT/ML injection pen Inject 8 Units into the skin 2 times daily  5 pen 3    metoprolol succinate (TOPROL XL) 50 MG extended release tablet TAKE 0.5 TABLETS BY MOUTH 2 TIMES DAILY (WITH MEALS) (Patient taking differently: Take 25 mg by mouth daily ) 30 tablet 5    aspirin 81 MG chewable tablet Take 1 tablet by mouth daily 30 tablet 3    dicyclomine (BENTYL) 20 MG tablet Take 20 mg by mouth 4 times daily (before meals and nightly)       nitroGLYCERIN (NITROSTAT) 0.4 MG SL tablet Place 1 tablet under the tongue every 5 minutes as needed for Chest pain up to max of 3 total doses.  If no relief after 1 dose, call 911. 25 tablet 3    Nutritional Supplements (ENSURE) LIQD Take 1 Can by mouth 3 times daily as needed (nutrition) 90 Can 5    atorvastatin (LIPITOR) 80 MG tablet Take 1 tablet by mouth nightly 90 tablet 5    amLODIPine (NORVASC) 5 MG tablet Take 1 tablet by mouth 2 times daily 180 tablet 5    blood glucose test strips (TRUE METRIX BLOOD GLUCOSE TEST) strip 1 each by Does not apply route 2 times daily 100 each 5    ondansetron (ZOFRAN) 4 MG tablet Take 1 tablet by mouth 3 times daily as needed for Nausea or Vomiting 30 tablet 0    clopidogrel (PLAVIX) 75 MG tablet TAKE 1 TABLET BY MOUTH DA JULIEN 90 tablet 5    albuterol (PROVENTIL) (2.5 MG/3ML) 0.083% nebulizer solution Take 3 mLs by nebulization every 4 hours as needed for Wheezing 120 each 5    budesonide-formoterol (SYMBICORT) 160-4.5 MCG/ACT AERO Inhale 2 puffs into the lungs daily 2 Inhaler 5    albuterol sulfate HFA (VENTOLIN HFA) 108 (90 Base) MCG/ACT inhaler Inhale 2 puffs into the lungs every 4 hours as needed for Wheezing or Shortness of Breath 3 Inhaler 5    ranolazine (RANEXA) 1000 MG extended release tablet Take 1 tablet by mouth 2 times daily 60 tablet 8     No current facility-administered medications for this visit. No current outpatient medications on file. Facility-Administered Medications Ordered in Other Visits   Medication Dose Route Frequency Provider Last Rate Last Admin    0.9 % sodium chloride infusion   Intravenous Continuous Sariah Tompkins MD        sodium chloride flush 0.9 % injection 10 mL  10 mL Intravenous 2 times per day Sariah Tompkins MD        sodium chloride flush 0.9 % injection 10 mL  10 mL Intravenous PRN Sariah Tompkins MD        0.9 % sodium chloride infusion  25 mL Intravenous PRN Sariah Tompkins MD         Vital Signs (Current)   There were no vitals filed for this visit.   Vital Signs Statistics (for past 48 hrs)     Temp  Av °F (36.1 °C)  Min: 97 °F (36.1 °C)   Min taken time: 21 1013  Max: 97 °F (36.1 °C)   Max taken time: 21 1013  Pulse  Av  Min: 80   Min taken time: 21 1013  Max: 80   Max taken time: 21 1013  Resp  Av  Min: 14   Min taken time: 21 1013  Max: 14   Max taken time: 21 1013  BP  Min: 137/54   Min taken time: 21 1013  Max: 137/54   Max taken time: 21 1013  SpO2  Av %  Min: 96 %   Min taken time: 21 1013  Max: 96 %   Max taken time: 21 1013    BP Readings from Last 3 Encounters:   21 (!) 137/54   21 122/70   21 136/74     BMI  There is no height or weight on file to calculate BMI. Estimated body mass index is 24.41 kg/m² as calculated from the following:    Height as of an earlier encounter on 21: 5' (1.524 m). Weight as of an earlier encounter on 21: 125 lb (56.7 kg). CBC   Lab Results   Component Value Date    WBC 3.8 2021    RBC 3.09 2021    HGB 9.9 2021    HCT 30.4 2021    MCV 98.4 2021    RDW 15.2 2021     2021     CMP    Lab Results   Component Value Date     2021    K 4.5 2021     2021    CO2 23 2021    BUN 26 2021    CREATININE 1.5 2021    GFRAA 42 2021    GFRAA 60 2013    AGRATIO 1.1 2021    LABGLOM 35 2021    GLUCOSE 112 2021    PROT 9.3 2021    PROT 8.1 02/15/2013    CALCIUM 8.8 2021    BILITOT 0.3 2021    ALKPHOS 150 2021    AST 45 2021    ALT 36 2021     BMP    Lab Results   Component Value Date     2021    K 4.5 2021     2021    CO2 23 2021    BUN 26 2021    CREATININE 1.5 2021    CALCIUM 8.8 2021    GFRAA 42 2021    GFRAA 60 2013    LABGLOM 35 2021    GLUCOSE 112 2021     POCGlucose  No results for input(s): GLUCOSE in the last 72 hours.    Coags    Lab Results   Component Value Date    PROTIME 11.6 2021    PROTIME 10.3 12/15/2009    INR 1.00 2021    APTT 35.4      HCG (If Applicable) No results found for: PREGTESTUR, PREGSERUM, HCG, HCGQUANT   ABGs   Lab Results   Component Value Date    PHART 7.367 2019    PO2ART 97.4 2019    HYF3WNL 29.6 2019    YVC6PQY 16.6 07/28/2019    BEART -7.4 07/28/2019    H5ISXXBF 98.3 07/28/2019      Type & Screen (If Applicable)  No results found for: LABABO, LABRH                         BMI: Wt Readings from Last 3 Encounters:       NPO Status:                          Anesthesia Evaluation  Patient summary reviewed no history of anesthetic complications:   Airway: Mallampati: III  TM distance: >3 FB   Neck ROM: full   Dental:    (+) edentulous      Pulmonary:normal exam    (+) COPD: moderate,  shortness of breath:  asthma:     (-) recent URI, sleep apnea and not a current smoker                           Cardiovascular:    (+) hypertension:, angina: no interval change, past MI:, CAD:, CABG/stent (multiple Stents, angioplasty and stent 11/2020):, dysrhythmias (PVCs): atrial fibrillation, CHF (EF 70):,       ECG reviewed  Rhythm: regular  Rate: normal    Stress test reviewed       Beta Blocker:  Dose within 24 Hrs         Neuro/Psych:   (+) CVA (right sided weakness): residual symptoms, neuromuscular disease:, headaches:, psychiatric history:depression/anxiety    (-) seizures           GI/Hepatic/Renal:   (+) GERD:, renal disease: CRI, bowel prep,          ROS comment: Recently hospitalized with KOLBY and chest pain. Endo/Other:    (+) DiabetesType II DM, using insulin, blood dyscrasia: anticoagulation therapy:., .                 Abdominal:           Vascular: negative vascular ROS. Anesthesia Plan      MAC     ASA 4       Induction: intravenous. Anesthetic plan and risks discussed with patient. Plan discussed with CRNA. This pre-anesthesia assessment may be used as a history and physical.    DOS STAFF ADDENDUM:    Pt seen and examined, chart reviewed (including anesthesia, drug and allergy history). No interval changes to history and physical examination. Anesthetic plan, risks, benefits, alternatives, and personnel involved discussed with patient. Questions and concerns addressed. Patient(family) verbalized an understanding and agrees to proceed.       Shabnam Palmer MD  April 9, 2021  10:29 AM

## 2021-04-09 NOTE — H&P
[address] [place] [address]   Pre-operative History and Physical    Patient: Juancho Cortez  : 1955  Acct#:     HISTORY OF PRESENT ILLNESS:    The patient is a 60 y.o. female who presents with anemia for colonoscopy. Past Medical History:        Diagnosis Date    Acid reflux     Anemia     Anxiety     Arthritis     Asthma     Atrial fibrillation (HCC)     Blood transfusion reaction     CAD (coronary artery disease) 12/3/2012    Cerebral artery occlusion with cerebral infarction [facility])     right sided weakness & headache    CHF (congestive heart failure) (HCC)     Chronic kidney disease     40% kidney functiom    COPD (chronic obstructive pulmonary disease) (HCC)     Depression     DM2 (diabetes mellitus, type 2) ([facility] [address]     Dysarthria     Fibromyalgia 2016    Headache(784.0) 2013    Hemisensory loss     Hyperlipidemia     Hypertension     IBS (irritable bowel syndrome)     Inferior vena cava occlusion (HCC)     Irritable bowel syndrome     Keratitis     MI, old     Neuropathy     Superior vena cava obstruction     Temporal arteritis ([facility] [address] 2014      Past Surgical History:        Procedure Laterality Date    ABLATION OF DYSRHYTHMIC FOCUS      ARTERY BIOPSY Right 2014    RIGHT TEMPORAL ARTERY BIOPSY    CAROTID ARTERY SURGERY Left     clean up per pt    CATARACT REMOVAL Bilateral     CHOLECYSTECTOMY      CORONARY ANGIOPLASTY WITH STENT PLACEMENT      CORONARY ANGIOPLASTY WITH STENT PLACEMENT      HYSTERECTOMY      JOINT REPLACEMENT Right     KNEE ARTHROSCOPY Right     KNEE SURGERY      right knee replacement    PORT SURGERY      x`s 4 & removal of 4    PTCA  10/2019    LAD and RCA inrtervention    TUNNELED VENOUS PORT PLACEMENT      left thigh.   SMART PORT    UPPER GASTROINTESTINAL ENDOSCOPY N/A 2020    EGD DIAGNOSTIC ONLY performed by Scott Stubbs MD at [address] [place] [address] Road        Medications Prior to Admission:   No current facility-administered medications on file prior to encounter. Current Outpatient Medications on File Prior to Encounter   Medication Sig Dispense Refill    DULoxetine (CYMBALTA) 60 MG extended release capsule Take 1 capsule by mouth daily 30 capsule 1    oxyCODONE-acetaminophen (PERCOCET) 7.5-325 MG per tablet Take 1 tablet by mouth every 6 hours as needed for Pain (max 3-4 day) for up to 28 days.  84 tablet 0    rizatriptan (MAXALT) 10 MG tablet Take 1 tablet by mouth once as needed for Migraine May repeat in 2 hours if needed 9 tablet 0    Erenumab-aooe (AIMOVIG) 70 MG/ML SOAJ INJECT 140 MLS INTO THE SKIN EVERY 30 DAYS 1 mL 1    insulin aspart (NOVOLOG FLEXPEN) 100 UNIT/ML injection pen Inject 3 Units into the skin 3 times daily (before meals)      torsemide (DEMADEX) 10 MG tablet Take 10 mg by mouth daily      tiZANidine (ZANAFLEX) 4 MG tablet Take 4 mg by mouth daily as needed      omeprazole (PRILOSEC) 20 MG delayed release capsule TAKE 1 CAPSULE BY MOUTH DAILY 30 capsule 1    insulin glargine (BASAGLAR KWIKPEN) 100 UNIT/ML injection pen Inject 8 Units into the skin 2 times daily  5 pen 3    metoprolol succinate (TOPROL XL) 50 MG extended release tablet TAKE 0.5 TABLETS BY MOUTH 2 TIMES DAILY (WITH MEALS) (Patient taking differently: Take 25 mg by mouth daily ) 30 tablet 5    aspirin 81 MG chewable tablet Take 1 tablet by mouth daily 30 tablet 3    dicyclomine (BENTYL) 20 MG tablet Take 20 mg by mouth 4 times daily (before meals and nightly)       Nutritional Supplements (ENSURE) LIQD Take 1 Can by mouth 3 times daily as needed (nutrition) 90 Can 5    atorvastatin (LIPITOR) 80 MG tablet Take 1 tablet by mouth nightly 90 tablet 5    amLODIPine (NORVASC) 5 MG tablet Take 1 tablet by mouth 2 times daily 180 tablet 5    ondansetron (ZOFRAN) 4 MG tablet Take 1 tablet by mouth 3 times daily as needed for Nausea or Vomiting 30 tablet 0    clopidogrel (PLAVIX) 75 MG tablet TAKE 1 TABLET BY MOUTH DA JULIEN 90 tablet 5    albuterol (PROVENTIL) (2.5 MG/3ML) 0.083% nebulizer solution Take 3 mLs by nebulization every 4 hours as needed for Wheezing 120 each 5    budesonide-formoterol (SYMBICORT) 160-4.5 MCG/ACT AERO Inhale 2 puffs into the lungs daily 2 Inhaler 5    albuterol sulfate HFA (VENTOLIN HFA) 108 (90 Base) MCG/ACT inhaler Inhale 2 puffs into the lungs every 4 hours as needed for Wheezing or Shortness of Breath 3 Inhaler 5    ranolazine (RANEXA) 1000 MG extended release tablet Take 1 tablet by mouth 2 times daily 60 tablet 8    nitroGLYCERIN (NITROSTAT) 0.4 MG SL tablet Place 1 tablet under the tongue every 5 minutes as needed for Chest pain up to max of 3 total doses. If no relief after 1 dose, call 911. 25 tablet 3    blood glucose test strips (TRUE METRIX BLOOD GLUCOSE TEST) strip 1 each by Does not apply route 2 times daily 100 each 5        Allergies:  Patient has no known allergies. Social History:   Social History     Socioeconomic History    Marital status:       Spouse name: Not on file    Number of children: 6    Years of education: Not on file    Highest education level: Not on file   Occupational History    Not on file   Social Needs    Financial resource strain: Not on file    Food insecurity     Worry: Not on file     Inability: Not on file    Transportation needs     Medical: Not on file     Non-medical: Not on file   Tobacco Use    Smoking status: Former Smoker     Packs/day: 0.50     Years: 35.00     Pack years: 17.50     Types: Cigarettes     Quit date: 2018     Years since quittin.7    Smokeless tobacco: Never Used    Tobacco comment: 5/13/15 has not smoked since hospitalization [facility]   Substance and Sexual Activity    Alcohol use: No     Alcohol/week: 0.0 standard drinks    Drug use: No    Sexual activity: Yes     Partners: Male   Lifestyle    Physical activity     Days per week: Not on file     Minutes per session: Not on file    Stress: Not on file Relationships    Social connections     Talks on phone: Not on file     Gets together: Not on file     Attends Hinduism service: Not on file     Active member of club or organization: Not on file     Attends meetings of clubs or organizations: Not on file     Relationship status: Not on file    Intimate partner violence     Fear of current or ex partner: Not on file     Emotionally abused: Not on file     Physically abused: Not on file     Forced sexual activity: Not on file   Other Topics Concern    Not on file   Social History Narrative    Not on file      Family History:       Problem Relation Age of Onset    Diabetes Mother     Hypertension Mother     High Cholesterol Mother     Stroke Mother     Cancer Mother     No Known Problems Paternal Grandfather         lung issues         PHYSICAL EXAM:      BP (!) 137/54   Pulse 80   Temp 97 °F (36.1 °C) (Temporal)   Resp 14   Ht 5' (1.524 m)   Wt 125 lb (56.7 kg)   SpO2 96%   BMI 24.41 kg/m²  I        Heart:  RRR    Lungs:  CTA b    Abdomen:  S/NT/ND/+BS      ASSESSMENT AND PLAN:  ASA: per anesthesia  Mallampati: per anesthesia  1. Patient is a 60 y.o. female here for colonoscopy   2. Procedure options, risks and benefits reviewed with the patient. The patient expresses understanding.     Cole Mendenhall

## 2021-04-09 NOTE — PROGRESS NOTES
Ambulatory Surgery/Procedure Discharge Note    Vitals:    04/09/21 1255   BP: (!) 150/80   Pulse: 66   Resp: 18   Temp: 97 °F (36.1 °C)   SpO2: 100%       In: 250 [I.V.:250]  Out: -     Restroom use offered before discharge. Yes    Pain assessment:  none  Pain Level: 0    Discharge instructions given to patient and her daughter stressing to tamela MD to reschedule colonoscopy and find out about 2 day prep. Verbalizes understanding. Patient discharged to home/self care.  Patient discharged via wheel chair by transporter to waiting family/S.O.       4/9/2021 1:19 PM

## 2021-04-09 NOTE — OP NOTE
complications. EBL: minimal  Specimens taken: yes      Impression:   Poor preparation significantly limiting exam.  Limited views of the colon were normal otherwise    Recommendations:   1. Clear liquid diet, advance as tolerated. 2.  Repeat colonoscopy with 2-day bowel preparation. Given the cardiac stents, I would recommend performing this without stopping the plavix. 3.  Resume plavix today.     Maria Esther Avitia MD   [address]  4/9/2021

## 2021-04-16 ENCOUNTER — VIRTUAL VISIT (OUTPATIENT)
Dept: PAIN MANAGEMENT | Age: 66
End: 2021-04-16
Payer: COMMERCIAL

## 2021-04-16 DIAGNOSIS — G89.4 CHRONIC PAIN SYNDROME: ICD-10-CM

## 2021-04-16 DIAGNOSIS — M51.36 DDD (DEGENERATIVE DISC DISEASE), LUMBAR: ICD-10-CM

## 2021-04-16 DIAGNOSIS — M79.7 FIBROMYALGIA: ICD-10-CM

## 2021-04-16 DIAGNOSIS — E11.40 CHRONIC PAINFUL DIABETIC NEUROPATHY (HCC): ICD-10-CM

## 2021-04-16 DIAGNOSIS — M75.81 TENDINITIS OF RIGHT ROTATOR CUFF: ICD-10-CM

## 2021-04-16 DIAGNOSIS — M75.81 ROTATOR CUFF TENDONITIS, RIGHT: ICD-10-CM

## 2021-04-16 DIAGNOSIS — M50.30 DDD (DEGENERATIVE DISC DISEASE), CERVICAL: ICD-10-CM

## 2021-04-16 PROCEDURE — 3017F COLORECTAL CA SCREEN DOC REV: CPT | Performed by: INTERNAL MEDICINE

## 2021-04-16 PROCEDURE — 99213 OFFICE O/P EST LOW 20 MIN: CPT | Performed by: INTERNAL MEDICINE

## 2021-04-16 PROCEDURE — 3052F HG A1C>EQUAL 8.0%<EQUAL 9.0%: CPT | Performed by: INTERNAL MEDICINE

## 2021-04-16 PROCEDURE — G8427 DOCREV CUR MEDS BY ELIG CLIN: HCPCS | Performed by: INTERNAL MEDICINE

## 2021-04-16 PROCEDURE — 2022F DILAT RTA XM EVC RTNOPTHY: CPT | Performed by: INTERNAL MEDICINE

## 2021-04-16 PROCEDURE — 1111F DSCHRG MED/CURRENT MED MERGE: CPT | Performed by: INTERNAL MEDICINE

## 2021-04-16 RX ORDER — TIZANIDINE 4 MG/1
TABLET ORAL
Qty: 60 TABLET | Refills: 0 | Status: SHIPPED | OUTPATIENT
Start: 2021-04-16 | End: 2021-05-14 | Stop reason: SDUPTHER

## 2021-04-16 RX ORDER — OXYCODONE AND ACETAMINOPHEN 7.5; 325 MG/1; MG/1
1 TABLET ORAL EVERY 6 HOURS PRN
Qty: 84 TABLET | Refills: 0 | Status: SHIPPED | OUTPATIENT
Start: 2021-04-16 | End: 2021-05-14 | Stop reason: SDUPTHER

## 2021-04-16 RX ORDER — ERENUMAB-AOOE 70 MG/ML
INJECTION SUBCUTANEOUS
Qty: 1 ML | Refills: 1 | Status: SHIPPED | OUTPATIENT
Start: 2021-04-16 | End: 2021-05-14 | Stop reason: SDUPTHER

## 2021-04-16 RX ORDER — QUETIAPINE FUMARATE 25 MG/1
25 TABLET, FILM COATED ORAL NIGHTLY
Qty: 60 TABLET | Refills: 3 | Status: SHIPPED | OUTPATIENT
Start: 2021-04-16 | End: 2021-05-14 | Stop reason: SDUPTHER

## 2021-04-16 RX ORDER — RIZATRIPTAN BENZOATE 10 MG/1
10 TABLET ORAL
Qty: 9 TABLET | Refills: 0 | Status: SHIPPED | OUTPATIENT
Start: 2021-04-16 | End: 2021-05-14 | Stop reason: SDUPTHER

## 2021-04-16 NOTE — PROGRESS NOTES
regimen has helped relieve about 10% of the pain. She denies side effects from the current pain regimen. Patient reports that since the last follow up visit the physical functioning is unchanged, family/social relationships are better, mood is unchanged and sleep patterns are unchanged, and that the overall functioning is unchanged. Patient denies neurological bowel or bladder. Patient denies misusing/abusing her narcotic pain medications or using any illegal drugs. There are No indicators for possible drug abuse, addiction or diversion problems. Upon obtaining the medical history from Ms. [address] [facility] regarding today's office visit for her presenting problems, patient states she has been managing somewhat with the medications. She is complaining of her neck, upper back and lower back hurting. Patient states she has been compliant with her regimen. She mentions she was in the hospital for pain and breathing (asthma) issues. She reports she had an MRI/CT of the hip, she states she is using ice. Patient says she has been walking daily. ALLERGIES: Patients list of allergies were reviewed     MEDICATIONS: Ms. [address] list of medications were reviewed. Her current medications are   Outpatient Medications Prior to Visit   Medication Sig Dispense Refill    tiZANidine (ZANAFLEX) 4 MG tablet Take 1/2-1 tablet po BID 60 tablet 0    QUEtiapine (SEROQUEL) 25 MG tablet Take 1 tablet by mouth nightly 60 tablet 3    alirocumab (PRALUENT) 75 MG/ML SOAJ injection pen Inject 1 mL into the skin every 14 days 6 pen 3    DULoxetine (CYMBALTA) 60 MG extended release capsule Take 1 capsule by mouth daily 30 capsule 1    oxyCODONE-acetaminophen (PERCOCET) 7.5-325 MG per tablet Take 1 tablet by mouth every 6 hours as needed for Pain (max 3-4 day) for up to 28 days.  84 tablet 0    Erenumab-aooe (AIMOVIG) 70 MG/ML SOAJ INJECT 140 MLS INTO THE SKIN EVERY 30 DAYS 1 mL 1    insulin aspart (NOVOLOG FLEXPEN) 100 UNIT/ML injection pen tablet by mouth nightly 60 tablet 3    alirocumab (PRALUENT) 75 MG/ML SOAJ injection pen Inject 1 mL into the skin every 14 days 6 pen 3    DULoxetine (CYMBALTA) 60 MG extended release capsule Take 1 capsule by mouth daily 30 capsule 1    oxyCODONE-acetaminophen (PERCOCET) 7.5-325 MG per tablet Take 1 tablet by mouth every 6 hours as needed for Pain (max 3-4 day) for up to 28 days. 84 tablet 0    Erenumab-aooe (AIMOVIG) 70 MG/ML SOAJ INJECT 140 MLS INTO THE SKIN EVERY 30 DAYS 1 mL 1    insulin aspart (NOVOLOG FLEXPEN) 100 UNIT/ML injection pen Inject 3 Units into the skin 3 times daily (before meals)      torsemide (DEMADEX) 10 MG tablet Take 10 mg by mouth daily      tiZANidine (ZANAFLEX) 4 MG tablet Take 4 mg by mouth daily as needed      omeprazole (PRILOSEC) 20 MG delayed release capsule TAKE 1 CAPSULE BY MOUTH DAILY 30 capsule 1    insulin glargine (BASAGLAR KWIKPEN) 100 UNIT/ML injection pen Inject 8 Units into the skin 2 times daily  5 pen 3    metoprolol succinate (TOPROL XL) 50 MG extended release tablet TAKE 0.5 TABLETS BY MOUTH 2 TIMES DAILY (WITH MEALS) (Patient taking differently: Take 25 mg by mouth daily ) 30 tablet 5    aspirin 81 MG chewable tablet Take 1 tablet by mouth daily 30 tablet 3    dicyclomine (BENTYL) 20 MG tablet Take 20 mg by mouth 4 times daily (before meals and nightly)       nitroGLYCERIN (NITROSTAT) 0.4 MG SL tablet Place 1 tablet under the tongue every 5 minutes as needed for Chest pain up to max of 3 total doses.  If no relief after 1 dose, call 911. 25 tablet 3    Nutritional Supplements (ENSURE) LIQD Take 1 Can by mouth 3 times daily as needed (nutrition) 90 Can 5    atorvastatin (LIPITOR) 80 MG tablet Take 1 tablet by mouth nightly 90 tablet 5    amLODIPine (NORVASC) 5 MG tablet Take 1 tablet by mouth 2 times daily 180 tablet 5    blood glucose test strips (TRUE METRIX BLOOD GLUCOSE TEST) strip 1 each by Does not apply route 2 times daily 100 each 5    ondansetron (ZOFRAN) 4 MG tablet Take 1 tablet by mouth 3 times daily as needed for Nausea or Vomiting 30 tablet 0    clopidogrel (PLAVIX) 75 MG tablet TAKE 1 TABLET BY MOUTH DA JULIEN 90 tablet 5    albuterol (PROVENTIL) (2.5 MG/3ML) 0.083% nebulizer solution Take 3 mLs by nebulization every 4 hours as needed for Wheezing 120 each 5    budesonide-formoterol (SYMBICORT) 160-4.5 MCG/ACT AERO Inhale 2 puffs into the lungs daily 2 Inhaler 5    albuterol sulfate HFA (VENTOLIN HFA) 108 (90 Base) MCG/ACT inhaler Inhale 2 puffs into the lungs every 4 hours as needed for Wheezing or Shortness of Breath 3 Inhaler 5    ranolazine (RANEXA) 1000 MG extended release tablet Take 1 tablet by mouth 2 times daily 60 tablet 8    rizatriptan (MAXALT) 10 MG tablet Take 1 tablet by mouth once as needed for Migraine May repeat in 2 hours if needed 9 tablet 0     No current facility-administered medications for this visit. I will continue her current medication regimen  which is part of the above treatment schedule. It has been helping with Ms. Meza's chronic  medical problems which for this visit include:   Diagnoses of Chronic pain syndrome, Fibromyalgia, DDD (degenerative disc disease), lumbar, Rotator cuff tendonitis, right, DDD (degenerative disc disease), cervical, Chronic painful diabetic neuropathy ([address] and Tendinitis of right rotator cuff were pertinent to this visit. Risks and benefits of the medications and other alternative treatments  including no treatment were discussed with the patient. The common side effects of these medications were also explained to the patient. Informed verbal consent was obtained. Goals of current treatment regimen include improvement in pain, restoration of functioning- with focus on improvement in physical performance, general activity, work or disability,emotional distress, health care utilization and  decreased medication consumption.  Will continue to monitor progress towards

## 2021-04-20 ENCOUNTER — OFFICE VISIT (OUTPATIENT)
Dept: CARDIOLOGY CLINIC | Age: 66
End: 2021-04-20
Payer: COMMERCIAL

## 2021-04-20 VITALS
HEART RATE: 80 BPM | WEIGHT: 122.6 LBS | BODY MASS INDEX: 23.94 KG/M2 | SYSTOLIC BLOOD PRESSURE: 119 MMHG | DIASTOLIC BLOOD PRESSURE: 70 MMHG

## 2021-04-20 DIAGNOSIS — I25.118 CORONARY ARTERY DISEASE OF NATIVE ARTERY OF NATIVE HEART WITH STABLE ANGINA PECTORIS (HCC): Primary | ICD-10-CM

## 2021-04-20 DIAGNOSIS — I10 ESSENTIAL HYPERTENSION: ICD-10-CM

## 2021-04-20 PROCEDURE — G8427 DOCREV CUR MEDS BY ELIG CLIN: HCPCS | Performed by: INTERNAL MEDICINE

## 2021-04-20 PROCEDURE — 99214 OFFICE O/P EST MOD 30 MIN: CPT | Performed by: INTERNAL MEDICINE

## 2021-04-20 PROCEDURE — 1111F DSCHRG MED/CURRENT MED MERGE: CPT | Performed by: INTERNAL MEDICINE

## 2021-04-20 PROCEDURE — 3017F COLORECTAL CA SCREEN DOC REV: CPT | Performed by: INTERNAL MEDICINE

## 2021-04-20 PROCEDURE — 1036F TOBACCO NON-USER: CPT | Performed by: INTERNAL MEDICINE

## 2021-04-20 PROCEDURE — G8420 CALC BMI NORM PARAMETERS: HCPCS | Performed by: INTERNAL MEDICINE

## 2021-04-20 NOTE — PROGRESS NOTES
[facility]   Dr Tricia Lorenzo. Osmani CAIN, [address] [facility]    Outpatient Follow Up Note    4/20/2021,11:05 AM  Subjective:   CHIEF COMPLAINT / HPI:  Follow Up secondary to coronary artery disease  Since I last     Violet Guevara is 60 y.o. female who presents today for a routine follow up. Since I last saw her she has been fairly stable. No significant chest pains or shortness of breath or dyspnea. She is actively moving about. Unfortunately still smoking. Her vitals are generally stable the lungs are clear extremities show no edema. She is on Praluent and we are waiting to see how her cholesterol numbers improved. Also on high-dose Lipitor 80 mg. She did have the [place] delvalle vaccine injections x2        Heart cath December 2020 and all previous stents were patent.    CAD coronary stents most recently December 2018  CKD 1.4  Continues to smoke  Hypertension  Diabetes mellitus on insulin  Hyperlipidemia on Praluent and Lipitor  Past Medical History:    Past Medical History:   Diagnosis Date    Acid reflux     Anemia     Anxiety     Arthritis     Asthma     Atrial fibrillation (HCC)     Blood transfusion reaction     CAD (coronary artery disease) 12/3/2012    Cerebral artery occlusion with cerebral infarction [facility])     right sided weakness & headache    CHF (congestive heart failure) (HCC)     Chronic kidney disease     40% kidney functiom    COPD (chronic obstructive pulmonary disease) ([facility])     Depression     DM2 (diabetes mellitus, type 2) ([facility] [address]     Dysarthria     Fibromyalgia 6/7/2016    Headache(784.0) 2/19/2013    Hemisensory loss     Hyperlipidemia     Hypertension     IBS (irritable bowel syndrome)     Inferior vena cava occlusion (HCC)     Irritable bowel syndrome     Keratitis     MI, old     Neuropathy     Superior vena cava obstruction     Temporal arteritis ([facility] [address] 2/24/2014     Past Surgical History  Past Surgical History:   Procedure Laterality aspart (NOVOLOG FLEXPEN) 100 UNIT/ML injection pen Inject 3 Units into the skin 3 times daily (before meals) Yes Historical Provider, MD   torsemide (DEMADEX) 10 MG tablet Take 10 mg by mouth daily Yes Historical Provider, MD   omeprazole (PRILOSEC) 20 MG delayed release capsule TAKE 1 CAPSULE BY MOUTH DAILY Yes Lanny Lozano MD   insulin glargine (BASAGLAR KWIKPEN) 100 UNIT/ML injection pen Inject 8 Units into the skin 2 times daily  Yes Raeann Guidry MD   metoprolol succinate (TOPROL XL) 50 MG extended release tablet TAKE 0.5 TABLETS BY MOUTH 2 TIMES DAILY (WITH MEALS)  Patient taking differently: Take 25 mg by mouth daily  Yes CYRUS Montoya   aspirin 81 MG chewable tablet Take 1 tablet by mouth daily Yes Candice Nazario MD   dicyclomine (BENTYL) 20 MG tablet Take 20 mg by mouth 4 times daily (before meals and nightly)  Yes Historical Provider, MD   nitroGLYCERIN (NITROSTAT) 0.4 MG SL tablet Place 1 tablet under the tongue every 5 minutes as needed for Chest pain up to max of 3 total doses. If no relief after 1 dose, call 911.  Yes Lanny Lozano MD   Nutritional Supplements (ENSURE) LIQD Take 1 Can by mouth 3 times daily as needed (nutrition) Yes Lanny Lozano MD   atorvastatin (LIPITOR) 80 MG tablet Take 1 tablet by mouth nightly Yes Lanny Lozano MD   amLODIPine (NORVASC) 5 MG tablet Take 1 tablet by mouth 2 times daily Yes Lanny Lozano MD   blood glucose test strips (TRUE METRIX BLOOD GLUCOSE TEST) strip 1 each by Does not apply route 2 times daily Yes Lanny Lozano MD   ondansetron (ZOFRAN) 4 MG tablet Take 1 tablet by mouth 3 times daily as needed for Nausea or Vomiting Yes Lanny Lozano MD   clopidogrel (PLAVIX) 75 MG tablet TAKE 1 TABLET BY MOUTH DA JULIEN Yes Lanny Lozano MD   albuterol (PROVENTIL) (2.5 MG/3ML) 0.083% nebulizer solution Take 3 mLs by nebulization every 4 hours as needed for Wheezing Yes Lanny Lozano MD   budesonide-formoterol [facility]) 160-4.5 MCG/ACT AERO Inhale 2 puffs into the lungs daily Yes Meenu Duvall MD   albuterol sulfate HFA (VENTOLIN HFA) 108 (90 Base) MCG/ACT inhaler Inhale 2 puffs into the lungs every 4 hours as needed for Wheezing or Shortness of Breath Yes Meenu Duvall MD   ranolazine (RANEXA) 1000 MG extended release tablet Take 1 tablet by mouth 2 times daily Yes Meenu Duvall MD   rizatriptan (MAXALT) 10 MG tablet Take 1 tablet by mouth once as needed for Migraine May repeat in 2 hours if needed  Michelet Ferro MD     Family History  Family History   Problem Relation Age of Onset    Diabetes Mother     Hypertension Mother     High Cholesterol Mother     Stroke Mother     Cancer Mother     No Known Problems Paternal Grandfather         lung issues        Current Medications  Current Outpatient Medications   Medication Sig Dispense Refill    tiZANidine (ZANAFLEX) 4 MG tablet Take 1/2-1 tablet po BID 60 tablet 0    QUEtiapine (SEROQUEL) 25 MG tablet Take 1 tablet by mouth nightly 60 tablet 3    oxyCODONE-acetaminophen (PERCOCET) 7.5-325 MG per tablet Take 1 tablet by mouth every 6 hours as needed for Pain (max 3-4 day) for up to 28 days.  84 tablet 0    Erenumab-aooe (AIMOVIG) 70 MG/ML SOAJ INJECT 140 MLS INTO THE SKIN EVERY 30 DAYS 1 mL 1    alirocumab (PRALUENT) 75 MG/ML SOAJ injection pen Inject 1 mL into the skin every 14 days 6 pen 3    DULoxetine (CYMBALTA) 60 MG extended release capsule Take 1 capsule by mouth daily 30 capsule 1    insulin aspart (NOVOLOG FLEXPEN) 100 UNIT/ML injection pen Inject 3 Units into the skin 3 times daily (before meals)      torsemide (DEMADEX) 10 MG tablet Take 10 mg by mouth daily      omeprazole (PRILOSEC) 20 MG delayed release capsule TAKE 1 CAPSULE BY MOUTH DAILY 30 capsule 1    insulin glargine (BASAGLAR KWIKPEN) 100 UNIT/ML injection pen Inject 8 Units into the skin 2 times daily  5 pen 3    metoprolol succinate (TOPROL XL) 50 MG extended release tablet TAKE 0.5 TABLETS BY MOUTH 2 TIMES DAILY (WITH MEALS) (Patient taking differently: Take 25 mg by mouth daily ) 30 tablet 5    aspirin 81 MG chewable tablet Take 1 tablet by mouth daily 30 tablet 3    dicyclomine (BENTYL) 20 MG tablet Take 20 mg by mouth 4 times daily (before meals and nightly)       nitroGLYCERIN (NITROSTAT) 0.4 MG SL tablet Place 1 tablet under the tongue every 5 minutes as needed for Chest pain up to max of 3 total doses. If no relief after 1 dose, call 911. 25 tablet 3    Nutritional Supplements (ENSURE) LIQD Take 1 Can by mouth 3 times daily as needed (nutrition) 90 Can 5    atorvastatin (LIPITOR) 80 MG tablet Take 1 tablet by mouth nightly 90 tablet 5    amLODIPine (NORVASC) 5 MG tablet Take 1 tablet by mouth 2 times daily 180 tablet 5    blood glucose test strips (TRUE METRIX BLOOD GLUCOSE TEST) strip 1 each by Does not apply route 2 times daily 100 each 5    ondansetron (ZOFRAN) 4 MG tablet Take 1 tablet by mouth 3 times daily as needed for Nausea or Vomiting 30 tablet 0    clopidogrel (PLAVIX) 75 MG tablet TAKE 1 TABLET BY MOUTH DA JULIEN 90 tablet 5    albuterol (PROVENTIL) (2.5 MG/3ML) 0.083% nebulizer solution Take 3 mLs by nebulization every 4 hours as needed for Wheezing 120 each 5    budesonide-formoterol (SYMBICORT) 160-4.5 MCG/ACT AERO Inhale 2 puffs into the lungs daily 2 Inhaler 5    albuterol sulfate HFA (VENTOLIN HFA) 108 (90 Base) MCG/ACT inhaler Inhale 2 puffs into the lungs every 4 hours as needed for Wheezing or Shortness of Breath 3 Inhaler 5    ranolazine (RANEXA) 1000 MG extended release tablet Take 1 tablet by mouth 2 times daily 60 tablet 8    rizatriptan (MAXALT) 10 MG tablet Take 1 tablet by mouth once as needed for Migraine May repeat in 2 hours if needed 9 tablet 0     No current facility-administered medications for this visit. REVIEW OF SYSTEMS:    CONSTITUTIONAL: No major weight gain or loss, fatigue, weakness, night sweats or fever.   HEENT: No new vision most recent nuclear study. Return in 4 months.   This note was likely completed using voice recognition technology and may contain unintended errors

## 2021-04-20 NOTE — PROGRESS NOTES
[facility] [place] [id number]     Outpatient Cardiology Consult  Consulting Cardiologist Enrico Miner M.D., [facility]  Referring Provider:  Leila Marc MD    4/20/2021,10:43 AM    No chief complaint on file. Asked by No admitting provider for patient encounter. Leila Marc MD  to evaluate and assess this patient's coronary disease    History of Present Illness: Meena Carrillo is a 60 y.o. female is here for a second opinion regarding her current cardiac disease and coronary status. She has known disease and has undergone several coronary revascularization procedures with balloons and stents including the proximal aspect of the LAD and the circumflex and the proximal right coronary arteries. She is here with her daughter and have concerns that she might need some other intervention. I did review her last angiogram report and I do see that the coronary arteries actually look fairly good with good flow and that the stents seem to be holding up very nicely and this was some December 2020. She currently has chest pains occasionally. It occurs at rest as well as with minimal activity. She did have a CT angiogram that was done in 2021 that did was not very revealing and that there was a \"bloom\" at the areas of the previous stents and so the internal dimensions would not be able to be seen. This patient continues to smoke. Her last LDL was 89 on current therapy which includes Lipitor 80 mg/day. Her weight has remained relatively stable. She did have both of her Covid vaccines Moderna. She does have CKD level 3    She indicates that at one time she was taken by surgery to the surgical suite for bypass but was terminated because of blockage. I have not found the substantiation of that episode but will look for additional documentation.      CAD coronary stents most recently December 2020   CKD 1.4  Continues to smoke  Hypertension  Diabetes mellitus on insulin    Past Medical History:   has a past medical history of Acid reflux, Anemia, Anxiety, Arthritis, Asthma, Atrial fibrillation (HCC), Blood transfusion reaction, CAD (coronary artery disease), Cerebral artery occlusion with cerebral infarction ([facility] [address] CHF (congestive heart failure) ([facility] [address] Chronic kidney disease, COPD (chronic obstructive pulmonary disease) ([facility] [address] Depression, DM2 (diabetes mellitus, type 2) ([facility] [address] Dysarthria, Fibromyalgia, Headache(784.0), Hemisensory loss, Hyperlipidemia, Hypertension, IBS (irritable bowel syndrome), Inferior vena cava occlusion ([address] Irritable bowel syndrome, Keratitis, MI, old, Neuropathy, Superior vena cava obstruction, and Temporal arteritis ([facility] [address] Surgical History:   has a past surgical history that includes knee surgery; Tunneled venous port placement; Coronary angioplasty with stent; Cholecystectomy; ablation of dysrhythmic focus; Cataract removal (Bilateral); joint replacement (Right); Hysterectomy; Artery Biopsy (Right, 04/23/2014); Coronary angioplasty with stent; Knee arthroscopy (Right); vascular surgery; Percutaneous Transluminal Coronary Angio (10/2019); Upper gastrointestinal endoscopy (N/A, 7/6/2020); [place] [id number] Surgery; Carotid artery surgery (Left); and Colonoscopy (N/A, 4/9/2021). Social History:   reports that she quit smoking about 2 years ago. Her smoking use included cigarettes. She has a 17.50 pack-year smoking history. She has never used smokeless tobacco. She reports that she does not drink alcohol or use drugs. Family History:  family history includes Cancer in her mother; Diabetes in her mother; High Cholesterol in her mother; Hypertension in her mother; No Known Problems in her paternal grandfather; Stroke in her mother. Home Medications:  Prior to Admission medications    Medication Sig Start Date End Date Taking?  Authorizing Provider   tiZANidine (ZANAFLEX) 4 MG tablet Take 1/2-1 tablet po BID 4/16/21  Yes Tahir Chaparro MD   QUEtiapine (SEROQUEL) 25 MG tablet Take 1 tablet by mouth nightly 4/16/21  Yes Renetta Bacon MD   oxyCODONE-acetaminophen (PERCOCET) 7.5-325 MG per tablet Take 1 tablet by mouth every 6 hours as needed for Pain (max 3-4 day) for up to 28 days. 4/16/21 5/14/21 Yes Renetta Bacon MD   Erenumab-aooe (AIMOVIG) 70 MG/ML SOAJ INJECT 140 MLS INTO THE SKIN EVERY 30 DAYS 4/16/21  Yes Renetta Bacon MD   alirocumab (PRALUENT) 75 MG/ML SOAJ injection pen Inject 1 mL into the skin every 14 days 3/29/21  Yes Jo Homans, APRN - CNP   DULoxetine (CYMBALTA) 60 MG extended release capsule Take 1 capsule by mouth daily 3/19/21  Yes Renetta Bacon MD   insulin aspart (NOVOLOG FLEXPEN) 100 UNIT/ML injection pen Inject 3 Units into the skin 3 times daily (before meals)   Yes Historical Provider, MD   torsemide (DEMADEX) 10 MG tablet Take 10 mg by mouth daily   Yes Historical Provider, MD   omeprazole (PRILOSEC) 20 MG delayed release capsule TAKE 1 CAPSULE BY MOUTH DAILY 3/5/21  Yes Cris Anders MD   insulin glargine (BASAGLAR KWIKPEN) 100 UNIT/ML injection pen Inject 8 Units into the skin 2 times daily  3/5/21  Yes Seven Barker MD   metoprolol succinate (TOPROL XL) 50 MG extended release tablet TAKE 0.5 TABLETS BY MOUTH 2 TIMES DAILY (WITH MEALS)  Patient taking differently: Take 25 mg by mouth daily  2/5/21  Yes CYRUS Crowell   aspirin 81 MG chewable tablet Take 1 tablet by mouth daily 12/24/20  Yes Mazin Rodriguez MD   dicyclomine (BENTYL) 20 MG tablet Take 20 mg by mouth 4 times daily (before meals and nightly)  12/9/20  Yes Historical Provider, MD   nitroGLYCERIN (NITROSTAT) 0.4 MG SL tablet Place 1 tablet under the tongue every 5 minutes as needed for Chest pain up to max of 3 total doses.  If no relief after 1 dose, call 911. 12/16/20  Yes Cris Anders MD   Nutritional Supplements (ENSURE) LIQD Take 1 Can by mouth 3 times daily as needed (nutrition) 12/16/20  Yes Cris Anders MD   atorvastatin (LIPITOR) 80 MG tablet Take 1 tablet by mouth nightly 10/1/20  Yes Jasmine Rowe MD   amLODIPine (NORVASC) 5 MG tablet Take 1 tablet by mouth 2 times daily 10/1/20  Yes Jasmine Rowe MD   blood glucose test strips (TRUE METRIX BLOOD GLUCOSE TEST) strip 1 each by Does not apply route 2 times daily 9/25/20  Yes Jasmine Rowe MD   ondansetron (ZOFRAN) 4 MG tablet Take 1 tablet by mouth 3 times daily as needed for Nausea or Vomiting 9/23/20  Yes Jasmine Rowe MD   clopidogrel (PLAVIX) 75 MG tablet TAKE 1 TABLET BY MOUTH DA JULIEN 9/17/20  Yes Jasmine Rowe MD   albuterol (PROVENTIL) (2.5 MG/3ML) 0.083% nebulizer solution Take 3 mLs by nebulization every 4 hours as needed for Wheezing 3/25/20  Yes Jasmine Rowe MD   budesonide-formoterol (SYMBICORT) 160-4.5 MCG/ACT AERO Inhale 2 puffs into the lungs daily 12/6/19  Yes Jasmine Rowe MD   albuterol sulfate HFA (VENTOLIN HFA) 108 (90 Base) MCG/ACT inhaler Inhale 2 puffs into the lungs every 4 hours as needed for Wheezing or Shortness of Breath 12/6/19  Yes Jasmine Rowe MD   ranolazine (RANEXA) 1000 MG extended release tablet Take 1 tablet by mouth 2 times daily 9/10/19  Yes Jasmine Rowe MD   rizatriptan (MAXALT) 10 MG tablet Take 1 tablet by mouth once as needed for Migraine May repeat in 2 hours if needed 4/16/21 4/16/21  Juan Pablo Jacobo MD           Allergies:  Patient has no known allergies. Review of Systems:   · Constitutional: there has been no unanticipated weight loss. · Eyes: No visual changes or diplopia. No scleral icterus. · ENT: No Headaches, hearing loss or vertigo. No mouth sores or sore throat. · Cardiovascular: Reviewed in HPI  ·  Pulmonary:  no cough or sputum production. · Gastrointestinal: No abdominal pain, appetite loss, blood in stools. No change in bowel or bladder habits. · Genitourinary: No dysuria, trouble voiding, or hematuria. · Musculoskeletal:  No gait disturbance, weakness or joint complaints.   · Integumentary: No rash or pruritis. Endocrine: No malaise, fatigue or temperature intolerance. Hematologic/Lymphatic: No abnormal bruising or bleeding, blood clots or swollen lymph nodes. · Allergic/Immunologic: No nasal congestion or hives. · All other ROS are reviewed and are unremarkable. Physical Examination:      Wt Readings from Last 3 Encounters:   04/20/21 122 lb 9.6 oz (55.6 kg)   04/09/21 125 lb (56.7 kg)   04/03/21 125 lb 3.5 oz (56.8 kg)       BP Readings from Last 3 Encounters:   04/20/21 119/70   04/09/21 (!) 100/54   04/09/21 (!) 150/80       Pulse Readings from Last 3 Encounters:   04/20/21 80   04/09/21 66   04/03/21 64       Constitutional and General Appearance:  Healthy. And alert . HEENT: eyes and ears intact. No nasal masses  THYROID: not enlarged  LUNGS:  · Clear to auscultation and percussion  HEART and VASCULAR:  · The apical impulses not displaced  · Heart tones are crisp and normal  · Cervical veins are not engorged  · The carotid upstroke is normal in amplitude and contour without delay or bruit  · Peripheral pulses are symmetrical and full  · There is no clubbing, cyanosis of the extremities. · No peripheral edema  · Femoral Arteries: 2+ and equal  · Pedal Pulses: 2+ and equal   ABDOMEN[de-identified]  · No masses or tenderness  · Liver/Spleen: No Abnormalities Noted  NEUROLOGICAL:  . Moves all extremities to command. Cranial nerves 2-12 are in tact.   PSYCHIATRIC: alert and lucid  and oriented and appropriate  SKIN: No lesions or rashes  LYMPH NODES: none enlarged    ·   ·   ·     CBC with Differential:    Lab Results   Component Value Date    WBC 3.8 04/02/2021    RBC 3.09 04/02/2021    HGB 9.9 04/02/2021    HCT 30.4 04/02/2021     04/02/2021    MCV 98.4 04/02/2021    MCH 32.0 04/02/2021    MCHC 32.5 04/02/2021    RDW 15.2 04/02/2021    SEGSPCT 66.2 05/23/2013    BANDSPCT 1.0 07/15/2011    LYMPHOPCT 31.5 04/02/2021    MONOPCT 6.7 04/02/2021    EOSPCT 2.9 04/17/2012    BASOPCT 0.5 04/02/2021    MONOSABS 0.3 04/02/2021    LYMPHSABS 1.2 04/02/2021    EOSABS 0.1 04/02/2021    BASOSABS 0.0 04/02/2021    DIFFTYPE Auto 05/23/2013     BMP:    Lab Results   Component Value Date     04/03/2021    K 4.5 04/03/2021     04/03/2021    CO2 23 04/03/2021    BUN 26 04/03/2021    LABALBU 4.8 04/01/2021    CREATININE 1.5 04/03/2021    CALCIUM 8.8 04/03/2021    GFRAA 42 04/03/2021    GFRAA 60 05/23/2013    LABGLOM 35 04/03/2021     Uric Acid:  No components found for: URIC  PT/INR:    Lab Results   Component Value Date    PROTIME 11.6 04/01/2021    PROTIME 10.3 12/15/2009    INR 1.00 04/01/2021     Last 3 Troponin:    Lab Results   Component Value Date    TROPONINI <0.01 04/02/2021    TROPONINI <0.01 04/01/2021    TROPONINI <0.01 03/09/2021     FLP:    Lab Results   Component Value Date    TRIG 131 08/29/2019    HDL 70 08/29/2019    HDL 58 05/14/2012    LDLCALC 89 08/29/2019    LABVLDL 26 08/29/2019     Pharmacological Stress/MPI Results:3/11/21        1. Technically a satisfactory study.    2. No evidence of Ischemia by Myocardial Perfusion Imaging.    3. Gated Study shows normal LV size and Systolic function; EF is 70 %.             EKG: On 3/24/2021 sinus rhythm 70/min. One anterior septal myocardial infarction. Nonspecific ST and T wave changes. echocardiogramSummary 6/18/18 Left ventricular cavity size is normal.   Normal left ventricular wall thickness. Ejection fraction is visually estimated to be 55-60%. Normal right ventricular size and function. Trivial valvular disease. This patient was educated using the patient point room wall mount device. Absence from smokers and smoking and diet and exercising are important. Assessment/ Plan     CAD with recurring coronary ischemic symptoms. Several stents into the proximal vessels. Recommend the following   #1 increase metoprolol to 37.5 mg twice daily.   #2 stop smoking  #3 optimize lipids better and Praluent be bimonthly  #4 stop smoking  #5 return in 6 weeks. She will follow-up with Dr. Eric Harmon but if she comes back we will consider cardiac catheterization to determine her current status. Thanks for allowing us the opportunity  to participate in the evaluation and care of your patients.  Please call if we may assist further [phone]    This note was likely completed using voice recognition technology and may contain unintended errors  Donte Montague M.D., [facility]  8/96/191082:24 AM

## 2021-04-21 RX ORDER — PEN NEEDLE, DIABETIC 31 GX5/16"
NEEDLE, DISPOSABLE MISCELLANEOUS
Qty: 100 EACH | Refills: 5 | Status: ON HOLD | OUTPATIENT
Start: 2021-04-21 | End: 2022-01-10 | Stop reason: SDUPTHER

## 2021-04-21 NOTE — TELEPHONE ENCOUNTER
Medication:   Requested Prescriptions     Pending Prescriptions Disp Refills    UNIFINE PENTIPS 31G X 8 MM MISC [Pharmacy Med Name: Bertha Hodge 31 gauge x 5/16\" needle] 100 each 5     Sig: USE WITH insulin pens       Last Filled:      Patient Phone Number: [phone] (home)     Last appt: 3/1/2021   Next appt: Visit date not found    Last Labs DM:   Lab Results   Component Value Date    LABA1C 8.3 04/01/2021

## 2021-04-22 ENCOUNTER — TELEPHONE (OUTPATIENT)
Dept: CARDIOLOGY CLINIC | Age: 66
End: 2021-04-22

## 2021-04-22 NOTE — TELEPHONE ENCOUNTER
Submitted PA for BOTOX, via FAX to [facility]. STATUS: APPROVED. APPT scheduled for 5/11/2021.

## 2021-04-30 ENCOUNTER — TELEPHONE (OUTPATIENT)
Dept: PRIMARY CARE CLINIC | Age: 66
End: 2021-04-30

## 2021-04-30 RX ORDER — ONDANSETRON 4 MG/1
4 TABLET, FILM COATED ORAL EVERY 8 HOURS PRN
Qty: 21 TABLET | Refills: 1 | Status: SHIPPED | OUTPATIENT
Start: 2021-04-30 | End: 2021-06-02

## 2021-04-30 NOTE — TELEPHONE ENCOUNTER
Patient would like a prescription for nausea. She has been nauseated since yesterday and even vomited.   Please advise

## 2021-05-05 ENCOUNTER — TELEPHONE (OUTPATIENT)
Dept: PRIMARY CARE CLINIC | Age: 66
End: 2021-05-05

## 2021-05-05 RX ORDER — AMOXICILLIN 500 MG/1
500 CAPSULE ORAL 3 TIMES DAILY
Qty: 21 CAPSULE | Refills: 0 | Status: SHIPPED | OUTPATIENT
Start: 2021-05-05 | End: 2021-05-12

## 2021-05-05 NOTE — TELEPHONE ENCOUNTER
----- Message from Alfonso Green sent at 5/5/2021  8:03 AM EDT -----  Subject: Message to Provider    QUESTIONS  Information for Provider? Pt wants a call back from her provider on cough.     ---------------------------------------------------------------------------  --------------  CALL BACK INFO  What is the best way for the office to contact you? OK to leave message on   voicemail  Preferred Call Back Phone Number? [phone]  ---------------------------------------------------------------------------  --------------  SCRIPT ANSWERS  Relationship to Patient?  Self

## 2021-05-11 ENCOUNTER — OFFICE VISIT (OUTPATIENT)
Dept: PAIN MANAGEMENT | Age: 66
End: 2021-05-11
Payer: COMMERCIAL

## 2021-05-11 VITALS
BODY MASS INDEX: 23.63 KG/M2 | DIASTOLIC BLOOD PRESSURE: 81 MMHG | TEMPERATURE: 97.5 F | SYSTOLIC BLOOD PRESSURE: 161 MMHG | OXYGEN SATURATION: 96 % | HEART RATE: 91 BPM | WEIGHT: 121 LBS

## 2021-05-11 DIAGNOSIS — G43.111 INTRACTABLE MIGRAINE WITH AURA WITH STATUS MIGRAINOSUS: ICD-10-CM

## 2021-05-11 DIAGNOSIS — G89.4 CHRONIC PAIN SYNDROME: ICD-10-CM

## 2021-05-11 PROCEDURE — G8420 CALC BMI NORM PARAMETERS: HCPCS | Performed by: INTERNAL MEDICINE

## 2021-05-11 PROCEDURE — G8427 DOCREV CUR MEDS BY ELIG CLIN: HCPCS | Performed by: INTERNAL MEDICINE

## 2021-05-11 PROCEDURE — 64615 CHEMODENERV MUSC MIGRAINE: CPT | Performed by: INTERNAL MEDICINE

## 2021-05-11 PROCEDURE — 1036F TOBACCO NON-USER: CPT | Performed by: INTERNAL MEDICINE

## 2021-05-11 PROCEDURE — 3017F COLORECTAL CA SCREEN DOC REV: CPT | Performed by: INTERNAL MEDICINE

## 2021-05-11 NOTE — PROGRESS NOTES
Ms. Deana Chandler states she has been having increase headaches, she is having the almost daily, last greater than 4 hours. She says she has been using all the preventive medications which is not holding the pain. Patient is complaining of nausea and vomiting and photophobic's. 1. Chronic pain syndrome    2. Chronic migraine    3. Intractable migraine with aura with status migrainosus       Plan:                   BOTOX TREATMENT RECORD      Date: 2021         Name: Michaelle Garrido.  1955    Consent scanned into Chart. BP (!) 161/81   Pulse 91   Temp 97.5 °F (36.4 °C) (Infrared)   Wt 121 lb (54.9 kg)   SpO2 96%   BMI 23.63 kg/m²     Maren was seen for Botox injection today for chronic migraine. Past medical history, medications and allergies were reviewed. Procedure was explained to the patient and informed consent was obtained. Botox was injected in the head and neck muscles using the  Botox injection paradigm for Chronic Migraine. Please refer to the scanned sheet in electronic record for injection details.      Maren tolerated the procedure well and was advised to follow up with me in 12 weeks for repeat botox injection if indicated depending on the results of this injection and will follow up as scheduled for her other medical issues      Botox was provided by our office

## 2021-05-14 ENCOUNTER — TELEPHONE (OUTPATIENT)
Dept: PAIN MANAGEMENT | Age: 66
End: 2021-05-14

## 2021-05-14 ENCOUNTER — VIRTUAL VISIT (OUTPATIENT)
Dept: PAIN MANAGEMENT | Age: 66
End: 2021-05-14
Payer: COMMERCIAL

## 2021-05-14 DIAGNOSIS — M51.36 DDD (DEGENERATIVE DISC DISEASE), LUMBAR: ICD-10-CM

## 2021-05-14 DIAGNOSIS — M75.81 TENDINITIS OF RIGHT ROTATOR CUFF: ICD-10-CM

## 2021-05-14 DIAGNOSIS — M79.7 FIBROMYALGIA: ICD-10-CM

## 2021-05-14 DIAGNOSIS — E11.40 CHRONIC PAINFUL DIABETIC NEUROPATHY (HCC): ICD-10-CM

## 2021-05-14 DIAGNOSIS — M50.30 DDD (DEGENERATIVE DISC DISEASE), CERVICAL: ICD-10-CM

## 2021-05-14 DIAGNOSIS — M75.81 ROTATOR CUFF TENDONITIS, RIGHT: ICD-10-CM

## 2021-05-14 DIAGNOSIS — G89.4 CHRONIC PAIN SYNDROME: ICD-10-CM

## 2021-05-14 PROCEDURE — 99213 OFFICE O/P EST LOW 20 MIN: CPT | Performed by: INTERNAL MEDICINE

## 2021-05-14 PROCEDURE — 2022F DILAT RTA XM EVC RTNOPTHY: CPT | Performed by: INTERNAL MEDICINE

## 2021-05-14 PROCEDURE — 3017F COLORECTAL CA SCREEN DOC REV: CPT | Performed by: INTERNAL MEDICINE

## 2021-05-14 PROCEDURE — 3052F HG A1C>EQUAL 8.0%<EQUAL 9.0%: CPT | Performed by: INTERNAL MEDICINE

## 2021-05-14 PROCEDURE — G8428 CUR MEDS NOT DOCUMENT: HCPCS | Performed by: INTERNAL MEDICINE

## 2021-05-14 RX ORDER — ERENUMAB-AOOE 70 MG/ML
INJECTION SUBCUTANEOUS
Qty: 1 ML | Refills: 1 | Status: SHIPPED | OUTPATIENT
Start: 2021-05-14 | End: 2021-05-14 | Stop reason: ALTCHOICE

## 2021-05-14 RX ORDER — TIZANIDINE 4 MG/1
TABLET ORAL
Qty: 60 TABLET | Refills: 0 | Status: SHIPPED | OUTPATIENT
Start: 2021-05-14 | End: 2021-07-21 | Stop reason: SDUPTHER

## 2021-05-14 RX ORDER — QUETIAPINE FUMARATE 25 MG/1
25 TABLET, FILM COATED ORAL NIGHTLY
Qty: 60 TABLET | Refills: 3 | Status: SHIPPED | OUTPATIENT
Start: 2021-05-14 | End: 2021-06-11 | Stop reason: SDUPTHER

## 2021-05-14 RX ORDER — DULOXETIN HYDROCHLORIDE 60 MG/1
60 CAPSULE, DELAYED RELEASE ORAL DAILY
Qty: 30 CAPSULE | Refills: 1 | Status: SHIPPED | OUTPATIENT
Start: 2021-05-14 | End: 2021-06-11 | Stop reason: SDUPTHER

## 2021-05-14 RX ORDER — RIZATRIPTAN BENZOATE 10 MG/1
10 TABLET ORAL
Qty: 9 TABLET | Refills: 0 | Status: SHIPPED | OUTPATIENT
Start: 2021-05-14 | End: 2021-06-11 | Stop reason: SDUPTHER

## 2021-05-14 RX ORDER — OXYCODONE AND ACETAMINOPHEN 7.5; 325 MG/1; MG/1
1 TABLET ORAL EVERY 6 HOURS PRN
Qty: 84 TABLET | Refills: 0 | Status: SHIPPED | OUTPATIENT
Start: 2021-05-14 | End: 2021-06-11 | Stop reason: SDUPTHER

## 2021-05-14 NOTE — TELEPHONE ENCOUNTER
Evelyn states they will need new RX for Erenumab-aooe (AIMOVIG) 70 MG/ML SOAJ for the 140 mg. Pl's advise.

## 2021-05-14 NOTE — PROGRESS NOTES
TELE HEALTH VISIT (AUDIO-VISUAL)    Pursuant to the emergency declaration under the [facility], [facility] waiver authority and the Room [employer] and [employer] Act, this Virtual  Visit was conducted, with patient's/legal guardian's consent, to reduce the patient's risk of exposure to COVID-19 and provide continuity of care for an established patient. Service is  provided through a video synchronous discussion virtually to substitute for in-person clinic visit. Due to this being a TeleHealth encounter (During [facility]-49 public health emergency), evaluation of the following organ systems was limited: Vitals/Constitutional/EENT/Resp/CV/GI//MS/Neuro/Skin/Rkxv-Pwjw-Nbk. April Coyle  1955  [id number]    Ms. Adolfo Toure is being seen virtually for a follow up visit using one of the following techniques  Google Duo, Face time or Doxy. me  Informed verbal consent to the virtual visit was obtained from Ms. Susana. Risks associated with HIPPA compliance with the virtual visit was explained to the patient. Ms. Adolfo Toure is at her residence and Dr. Kemal Bledsoe is in his office. HISTORY OF PRESENT ILLNESS:  Ms. Adolfo Toure is a 60 y.o. female returns for a follow up visit for multiple medical problems. Her current presenting problems are   1. Chronic pain syndrome    2. Chronic migraine    3. Intractable migraine with aura with status migrainosus    4. Fibromyalgia    5. DDD (degenerative disc disease), lumbar    6. Rotator cuff tendonitis, right    7. DDD (degenerative disc disease), cervical    8. Chronic painful diabetic neuropathy ([facility] [address]    9. Tendinitis of right rotator cuff    . As per information/history obtained from the PADT(patient assessment and documentation tool) - She complains of pain in the head with radiation to the head She rates the pain 10/10 and describes it as dull.   Pain is made worse by: movement, walking, standing, sitting, bending, lying down, lifting. Current treatment regimen has helped relieve about 0% of the pain. She denies side effects from the current pain regimen. Patient reports that since the last follow up visit the physical functioning is unchanged, family/social relationships are unchanged, mood is unchanged and sleep patterns are unchanged, and that the overall functioning is unchanged. Patient denies neurological bowel or bladder. Patient denies misusing/abusing her narcotic pain medications or using any illegal drugs. There are No indicators for possible drug abuse, addiction or diversion problems. Upon obtaining the medical history from Ms. Meza regarding today's office visit for her presenting problems, patient states she has been having increase headaches still, she had Botox injection a few days ago and is using Aimovig and Maxalt. Patient states she has been compliant with all her medications. Patient denies any constipation symptoms. She mentions she is on Percocet 3 per day. ALLERGIES: Patients list of allergies were reviewed     MEDICATIONS: Ms. Clement Schofield list of medications were reviewed. Her current medications are   Outpatient Medications Prior to Visit   Medication Sig Dispense Refill    ondansetron (ZOFRAN) 4 MG tablet Take 1 tablet by mouth every 8 hours as needed for Nausea or Vomiting 21 tablet 1    UNIFINE PENTIPS 31G X 8 MM MISC USE WITH insulin pens 100 each 5    tiZANidine (ZANAFLEX) 4 MG tablet Take 1/2-1 tablet po BID 60 tablet 0    QUEtiapine (SEROQUEL) 25 MG tablet Take 1 tablet by mouth nightly 60 tablet 3    oxyCODONE-acetaminophen (PERCOCET) 7.5-325 MG per tablet Take 1 tablet by mouth every 6 hours as needed for Pain (max 3-4 day) for up to 28 days.  84 tablet 0    Erenumab-aooe (AIMOVIG) 70 MG/ML SOAJ INJECT 140 MLS INTO THE SKIN EVERY 30 DAYS 1 mL 1    rizatriptan (MAXALT) 10 MG tablet Take 1 tablet by mouth once as needed for Migraine May repeat in 2 hours if needed 9 tablet 0    alirocumab (PRALUENT) 75 MG/ML SOAJ injection pen Inject 1 mL into the skin every 14 days 6 pen 3    DULoxetine (CYMBALTA) 60 MG extended release capsule Take 1 capsule by mouth daily 30 capsule 1    insulin aspart (NOVOLOG FLEXPEN) 100 UNIT/ML injection pen Inject 3 Units into the skin 3 times daily (before meals)      torsemide (DEMADEX) 10 MG tablet Take 10 mg by mouth daily      omeprazole (PRILOSEC) 20 MG delayed release capsule TAKE 1 CAPSULE BY MOUTH DAILY 30 capsule 1    insulin glargine (BASAGLAR KWIKPEN) 100 UNIT/ML injection pen Inject 8 Units into the skin 2 times daily  5 pen 3    metoprolol succinate (TOPROL XL) 50 MG extended release tablet TAKE 0.5 TABLETS BY MOUTH 2 TIMES DAILY (WITH MEALS) (Patient taking differently: Take 25 mg by mouth daily ) 30 tablet 5    aspirin 81 MG chewable tablet Take 1 tablet by mouth daily 30 tablet 3    dicyclomine (BENTYL) 20 MG tablet Take 20 mg by mouth 4 times daily (before meals and nightly)       nitroGLYCERIN (NITROSTAT) 0.4 MG SL tablet Place 1 tablet under the tongue every 5 minutes as needed for Chest pain up to max of 3 total doses.  If no relief after 1 dose, call 911. 25 tablet 3    Nutritional Supplements (ENSURE) LIQD Take 1 Can by mouth 3 times daily as needed (nutrition) 90 Can 5    atorvastatin (LIPITOR) 80 MG tablet Take 1 tablet by mouth nightly 90 tablet 5    amLODIPine (NORVASC) 5 MG tablet Take 1 tablet by mouth 2 times daily 180 tablet 5    blood glucose test strips (TRUE METRIX BLOOD GLUCOSE TEST) strip 1 each by Does not apply route 2 times daily 100 each 5    clopidogrel (PLAVIX) 75 MG tablet TAKE 1 TABLET BY MOUTH DA JULIEN 90 tablet 5    albuterol (PROVENTIL) (2.5 MG/3ML) 0.083% nebulizer solution Take 3 mLs by nebulization every 4 hours as needed for Wheezing 120 each 5    budesonide-formoterol (SYMBICORT) 160-4.5 MCG/ACT AERO Inhale 2 puffs into the lungs daily 2 Inhaler 5    albuterol sulfate HFA (VENTOLIN HFA) 108 (90 Base) MCG/ACT inhaler Inhale 2 puffs into the lungs every 4 hours as needed for Wheezing or Shortness of Breath 3 Inhaler 5    ranolazine (RANEXA) 1000 MG extended release tablet Take 1 tablet by mouth 2 times daily 60 tablet 8     No facility-administered medications prior to visit. REVIEW OF SYSTEMS:    Respiratory: Negative for apnea, chest tightness and shortness of breath or change in baseline breathing. PHYSICAL EXAM:   Nursing note and vitals reviewed. There were no vitals taken for this visit. Constitutional: She appears well-developed and well-nourished. No acute distress. Cardiovascular: Normal rate, regular rhythm, normal heart sounds, and does not have murmur. Pulmonary/Chest: Effort normal. No respiratory distress. She does not have wheezes in the lung fields. She has no rales. Neurological/Psychiatric:She is alert and oriented to person, place, and time. Coordination is  normal.  Her mood isAppropriate and affect is Neutral/Euthymic(normal) . Her    IMPRESSION:   1. Chronic pain syndrome    2. Fibromyalgia    3. DDD (degenerative disc disease), lumbar    4. Rotator cuff tendonitis, right    5. DDD (degenerative disc disease), cervical    6. Chronic painful diabetic neuropathy ([address]    7. Tendinitis of right rotator cuff        PLAN:  Informed verbal consent was obtained  -continue with current Percocet 3 per day  -Monitor blood sugar regularly, diabetic control- adv diabetic diet. Goal for fasting blood sugars around 120.  Follow up with Endocrinologist/PCP also for on going management    -She was advised to increase fluids ( 5-7  glasses of fluid daily), limit caffeine, avoid cheese products, increase dietary fiber, increase activity and exercise as tolerated and relax regularly and enjoy meals   -walking/stretching exercises as advised    -she was advised  to avoid using too many OTC analgesics to control the headaches, avoid chocolates, increased caffeine, cheeses and Place 1 tablet under the tongue every 5 minutes as needed for Chest pain up to max of 3 total doses. If no relief after 1 dose, call 911. 25 tablet 3    Nutritional Supplements (ENSURE) LIQD Take 1 Can by mouth 3 times daily as needed (nutrition) 90 Can 5    atorvastatin (LIPITOR) 80 MG tablet Take 1 tablet by mouth nightly 90 tablet 5    amLODIPine (NORVASC) 5 MG tablet Take 1 tablet by mouth 2 times daily 180 tablet 5    blood glucose test strips (TRUE METRIX BLOOD GLUCOSE TEST) strip 1 each by Does not apply route 2 times daily 100 each 5    clopidogrel (PLAVIX) 75 MG tablet TAKE 1 TABLET BY MOUTH DA JULIEN 90 tablet 5    albuterol (PROVENTIL) (2.5 MG/3ML) 0.083% nebulizer solution Take 3 mLs by nebulization every 4 hours as needed for Wheezing 120 each 5    budesonide-formoterol (SYMBICORT) 160-4.5 MCG/ACT AERO Inhale 2 puffs into the lungs daily 2 Inhaler 5    albuterol sulfate HFA (VENTOLIN HFA) 108 (90 Base) MCG/ACT inhaler Inhale 2 puffs into the lungs every 4 hours as needed for Wheezing or Shortness of Breath 3 Inhaler 5    ranolazine (RANEXA) 1000 MG extended release tablet Take 1 tablet by mouth 2 times daily 60 tablet 8     No current facility-administered medications for this visit. I will continue her current medication regimen  which is part of the above treatment schedule. It has been helping with Ms. Meza's chronic  medical problems which for this visit include:   Diagnoses of Chronic pain syndrome, Chronic migraine, Intractable migraine with aura with status migrainosus, Fibromyalgia, DDD (degenerative disc disease), lumbar, Rotator cuff tendonitis, right, DDD (degenerative disc disease), cervical, Chronic painful diabetic neuropathy ([address] and Tendinitis of right rotator cuff were pertinent to this visit. Risks and benefits of the medications and other alternative treatments  including no treatment were discussed with the patient. The common side effects of these medications were also explained to the patient. Informed verbal consent was obtained. Goals of current treatment regimen include improvement in pain, restoration of functioning- with focus on improvement in physical performance, general activity, work or disability,emotional distress, health care utilization and  decreased medication consumption. Will continue to monitor progress towards achieving/maintaining therapeutic goals with special emphasis on  1. Improvement in perceived interfernce  of pain with ADL's. Ability to do home exercises independently. Ability to do household chores indoor and/or outdoor work and social and leisure activities. Improve psychosocial and physical functioning. - she is showing progression towards this treatment goal with the current regimen. She was advised against drinking alcohol with the narcotic pain medicines, advised against driving or handling machinery while adjusting the dose of medicines or if having cognitive  issues related to the current medications. Risk of overdose and death, if medicines not taken as prescribed, were also discussed. If the patient develops new symptoms or if the symptoms worsen, the patient should call the office. While transcribing every attempt was made to maintain the accuracy of the note in terms of it's contents,there may have been some errors made inadvertently. Thank you for allowing me to participate in the care of this patient.     Guzman Beckham MD.    Cc: Wilhelmenia Kocher, MD

## 2021-05-24 ENCOUNTER — TELEPHONE (OUTPATIENT)
Dept: PRIMARY CARE CLINIC | Age: 66
End: 2021-05-24

## 2021-05-24 DIAGNOSIS — G43.711 HEADACHE, CHRONIC MIGRAINE WITHOUT AURA, INTRACTABLE, WITH STATUS: Primary | ICD-10-CM

## 2021-05-25 ENCOUNTER — HOSPITAL ENCOUNTER (EMERGENCY)
Age: 66
Discharge: HOME | End: 2021-05-25
Attending: STUDENT IN AN ORGANIZED HEALTH CARE EDUCATION/TRAINING PROGRAM
Payer: COMMERCIAL

## 2021-05-25 ENCOUNTER — APPOINTMENT (OUTPATIENT)
Dept: CT IMAGING | Age: 66
End: 2021-05-25
Payer: COMMERCIAL

## 2021-05-25 VITALS
SYSTOLIC BLOOD PRESSURE: 121 MMHG | HEART RATE: 84 BPM | TEMPERATURE: 98.3 F | RESPIRATION RATE: 18 BRPM | OXYGEN SATURATION: 100 % | DIASTOLIC BLOOD PRESSURE: 98 MMHG

## 2021-05-25 DIAGNOSIS — G43.801 OTHER MIGRAINE WITH STATUS MIGRAINOSUS, NOT INTRACTABLE: Primary | ICD-10-CM

## 2021-05-25 PROCEDURE — 6370000000 HC RX 637 (ALT 250 FOR IP): Performed by: STUDENT IN AN ORGANIZED HEALTH CARE EDUCATION/TRAINING PROGRAM

## 2021-05-25 PROCEDURE — 70450 CT HEAD/BRAIN W/O DYE: CPT

## 2021-05-25 PROCEDURE — 96375 TX/PRO/DX INJ NEW DRUG ADDON: CPT

## 2021-05-25 PROCEDURE — 6360000002 HC RX W HCPCS: Performed by: STUDENT IN AN ORGANIZED HEALTH CARE EDUCATION/TRAINING PROGRAM

## 2021-05-25 PROCEDURE — 99285 EMERGENCY DEPT VISIT HI MDM: CPT

## 2021-05-25 PROCEDURE — 96374 THER/PROPH/DIAG INJ IV PUSH: CPT

## 2021-05-25 RX ORDER — DIPHENHYDRAMINE HYDROCHLORIDE 50 MG/ML
12.5 INJECTION INTRAMUSCULAR; INTRAVENOUS ONCE
Status: COMPLETED | OUTPATIENT
Start: 2021-05-25 | End: 2021-05-25

## 2021-05-25 RX ORDER — DEXAMETHASONE SODIUM PHOSPHATE 10 MG/ML
8 INJECTION, SOLUTION INTRAMUSCULAR; INTRAVENOUS ONCE
Status: COMPLETED | OUTPATIENT
Start: 2021-05-25 | End: 2021-05-25

## 2021-05-25 RX ORDER — ACETAMINOPHEN 500 MG
1000 TABLET ORAL ONCE
Status: COMPLETED | OUTPATIENT
Start: 2021-05-25 | End: 2021-05-25

## 2021-05-25 RX ORDER — PROCHLORPERAZINE EDISYLATE 5 MG/ML
10 INJECTION INTRAMUSCULAR; INTRAVENOUS ONCE
Status: COMPLETED | OUTPATIENT
Start: 2021-05-25 | End: 2021-05-25

## 2021-05-25 RX ORDER — KETOROLAC TROMETHAMINE 30 MG/ML
15 INJECTION, SOLUTION INTRAMUSCULAR; INTRAVENOUS ONCE
Status: COMPLETED | OUTPATIENT
Start: 2021-05-25 | End: 2021-05-25

## 2021-05-25 RX ADMIN — PROCHLORPERAZINE EDISYLATE 10 MG: 5 INJECTION INTRAMUSCULAR; INTRAVENOUS at 12:30

## 2021-05-25 RX ADMIN — DEXAMETHASONE SODIUM PHOSPHATE 8 MG: 10 INJECTION, SOLUTION INTRAMUSCULAR; INTRAVENOUS at 12:29

## 2021-05-25 RX ADMIN — KETOROLAC TROMETHAMINE 15 MG: 30 INJECTION, SOLUTION INTRAMUSCULAR; INTRAVENOUS at 12:28

## 2021-05-25 RX ADMIN — DIPHENHYDRAMINE HYDROCHLORIDE 12.5 MG: 50 INJECTION, SOLUTION INTRAMUSCULAR; INTRAVENOUS at 12:26

## 2021-05-25 RX ADMIN — ACETAMINOPHEN 1000 MG: 500 TABLET ORAL at 12:31

## 2021-05-25 ASSESSMENT — PAIN DESCRIPTION - ONSET: ONSET: PROGRESSIVE

## 2021-05-25 ASSESSMENT — PAIN SCALES - GENERAL
PAINLEVEL_OUTOF10: 3
PAINLEVEL_OUTOF10: 6
PAINLEVEL_OUTOF10: 10
PAINLEVEL_OUTOF10: 10

## 2021-05-25 ASSESSMENT — PAIN DESCRIPTION - PROGRESSION
CLINICAL_PROGRESSION: GRADUALLY IMPROVING
CLINICAL_PROGRESSION: GRADUALLY WORSENING

## 2021-05-25 ASSESSMENT — PAIN DESCRIPTION - PAIN TYPE: TYPE: ACUTE PAIN

## 2021-05-25 NOTE — ED NOTES
Bed: B-09  Expected date: 5/25/21  Expected time: 11:49 AM  Means of arrival: [facility]  Comments:  headache     Moises Friend RN  05/25/21 2320

## 2021-05-25 NOTE — ED TRIAGE NOTES
Pt presents to ED via EMS from home with c/o of a headache since Sunday. Reports hx of migraines. +n/v. HTN. Resp even and unlabored. A/ox4. No acute distress noted. Denies any need at this time. Call light within reach. Bed in lowest position. Will continue to monitor.

## 2021-05-25 NOTE — ED PROVIDER NOTES
[address] [facility]      Pt Name: Ezequiel Alvarez  MRN: [id number]  [place] 1955  Date of evaluation: 5/25/2021  Provider: Tommy Sood MD    CHIEF COMPLAINT     Headache      Chief Complaint   Patient presents with    Migraine     hx of. since sunday. +n/v       HISTORY OF PRESENT ILLNESS   (Location/Symptom, Timing/Onset,Context/Setting, Quality, Duration, Modifying Factors, Severity)  Note limiting factors. Ezequiel Alvarez is a 60 y.o. female who presents to the emergency department complaining of headache for the past 2 days. Onset gradual, progressively worse, similar to prior migraine headaches, associated with nausea and vomiting. Patient has a longstanding history of migraine headaches and this is overall similar to prior headaches. No fevers has no signs of weakness no sensory change. No chest pain. Symptoms not otherwise alleviated or exacerbated by other factors. NursingNotes were reviewed. REVIEW OF SYSTEMS    (2-9 systems for level 4, 10 or more for level 5)       Constitutional: No fever or chills. Eye: No visual disturbances. No eye pain. Ear/Nose/Mouth/Throat: No nasal congestion. No sore throat. Respiratory: No cough, No shortness of breath, No sputum production. Cardiovascular: No chest pain. No palpitations. Gastrointestinal: No abdominal pain. + nausea or vomiting  Genitourinary: No dysuria. No hematuria. Hematology/Lymphatics: No bleeding or bruising tendency. Immunologic: No malaise. No swollen glands. Musculoskeletal: No back pain. No joint pain. Integumentary: No rash. No abrasions. Neurologic: + headache. No focal numbness or weakness.       PAST MEDICAL HISTORY     Past Medical History:   Diagnosis Date    Acid reflux     Anemia     Anxiety     Arthritis     Asthma     Atrial fibrillation ([address]     Blood transfusion reaction     CAD (coronary artery disease) 12/3/2012    Cerebral artery occlusion with cerebral infarction [facility])     right sided weakness & headache    CHF (congestive heart failure) (HCC)     Chronic kidney disease     40% kidney functiom    COPD (chronic obstructive pulmonary disease) ([facility])     Depression     DM2 (diabetes mellitus, type 2) ([facility] [address]     Dysarthria     Fibromyalgia 6/7/2016    Headache(784.0) 2/19/2013    Hemisensory loss     Hyperlipidemia     Hypertension     IBS (irritable bowel syndrome)     Inferior vena cava occlusion (HCC)     Irritable bowel syndrome     Keratitis     MI, old     Neuropathy     Superior vena cava obstruction     Temporal arteritis ([facility] [address] 2/24/2014         SURGICALHISTORY       Past Surgical History:   Procedure Laterality Date    ABLATION OF DYSRHYTHMIC FOCUS  11/2020    ARTERY BIOPSY Right 04/23/2014    RIGHT TEMPORAL ARTERY BIOPSY    CAROTID ARTERY SURGERY Left     clean up per pt    CATARACT REMOVAL Bilateral     CHOLECYSTECTOMY      COLONOSCOPY N/A 4/9/2021    COLONOSCOPY WITH BIOPSY performed by Hima Arciniega MD at [address]    HYSTERECTOMY      JOINT REPLACEMENT Right     KNEE ARTHROSCOPY Right     PORT SURGERY      x`s 4 & removal of 4    PTCA  10/2019    LAD and RCA inrtervention    TUNNELED VENOUS PORT PLACEMENT      left thigh.   SMART PORT-----Removed    UPPER GASTROINTESTINAL ENDOSCOPY N/A 7/6/2020    EGD DIAGNOSTIC ONLY performed by Cortney De Los Santos MD at [facility]       Previous Medications    ALBUTEROL (PROVENTIL) (2.5 MG/3ML) 0.083% NEBULIZER SOLUTION    Take 3 mLs by nebulization every 4 hours as needed for Wheezing    ALBUTEROL SULFATE HFA (VENTOLIN HFA) 108 (90 BASE) MCG/ACT INHALER    Inhale 2 puffs into the lungs every 4 hours as needed for Wheezing or Shortness of Breath    ALIROCUMAB (PRALUENT) 75 MG/ML SOAJ INJECTION PEN    Inject 1 mL into the skin every 14 days    AMLODIPINE (NORVASC) 5 MG TABLET    Take 1 tablet by mouth 2 times daily    ASPIRIN 81 MG CHEWABLE TABLET    Take 1 tablet by mouth daily    ATORVASTATIN (LIPITOR) 80 MG TABLET    Take 1 tablet by mouth nightly    BLOOD GLUCOSE TEST STRIPS (TRUE METRIX BLOOD GLUCOSE TEST) STRIP    1 each by Does not apply route 2 times daily    BUDESONIDE-FORMOTEROL (SYMBICORT) 160-4.5 MCG/ACT AERO    Inhale 2 puffs into the lungs daily    CLOPIDOGREL (PLAVIX) 75 MG TABLET    TAKE 1 TABLET BY MOUTH DA JULIEN    DICYCLOMINE (BENTYL) 20 MG TABLET    Take 20 mg by mouth 4 times daily (before meals and nightly)     DULOXETINE (CYMBALTA) 60 MG EXTENDED RELEASE CAPSULE    Take 1 capsule by mouth daily    ERENUMAB-AOOE 140 MG/ML SOAJ    Inject 140 mLs into the skin every 30 days    INSULIN ASPART (NOVOLOG FLEXPEN) 100 UNIT/ML INJECTION PEN    Inject 3 Units into the skin 3 times daily (before meals)    INSULIN GLARGINE (BASAGLAR KWIKPEN) 100 UNIT/ML INJECTION PEN    Inject 8 Units into the skin 2 times daily     METOPROLOL SUCCINATE (TOPROL XL) 50 MG EXTENDED RELEASE TABLET    TAKE 0.5 TABLETS BY MOUTH 2 TIMES DAILY (WITH MEALS)    NITROGLYCERIN (NITROSTAT) 0.4 MG SL TABLET    Place 1 tablet under the tongue every 5 minutes as needed for Chest pain up to max of 3 total doses. If no relief after 1 dose, call 911. NUTRITIONAL SUPPLEMENTS (ENSURE) LIQD    Take 1 Can by mouth 3 times daily as needed (nutrition)    OMEPRAZOLE (PRILOSEC) 20 MG DELAYED RELEASE CAPSULE    TAKE 1 CAPSULE BY MOUTH DAILY    ONDANSETRON (ZOFRAN) 4 MG TABLET    Take 1 tablet by mouth every 8 hours as needed for Nausea or Vomiting    OXYCODONE-ACETAMINOPHEN (PERCOCET) 7.5-325 MG PER TABLET    Take 1 tablet by mouth every 6 hours as needed for Pain (max 3-4 day) for up to 28 days.     QUETIAPINE (SEROQUEL) 25 MG TABLET    Take 1 tablet by mouth nightly    RANOLAZINE (RANEXA) 1000 MG EXTENDED RELEASE TABLET    Take 1 tablet by mouth 2 times daily    RIZATRIPTAN (MAXALT) 10 MG TABLET Take 1 tablet by mouth once as needed for Migraine May repeat in 2 hours if needed    TIZANIDINE (ZANAFLEX) 4 MG TABLET    Take 1/2-1 tablet po BID    TORSEMIDE (DEMADEX) 10 MG TABLET    Take 10 mg by mouth daily    UNIFINE PENTIPS 31G X 8 MM MISC    USE WITH insulin pens       ALLERGIES     Patient has no known allergies. FAMILY HISTORY       Family History   Problem Relation Age of Onset    Diabetes Mother     Hypertension Mother     High Cholesterol Mother     Stroke Mother     Cancer Mother     No Known Problems Paternal Grandfather         lung issues           SOCIAL HISTORY       Social History     Socioeconomic History    Marital status:      Spouse name: Not on file    Number of children: 6    Years of education: Not on file    Highest education level: Not on file   Occupational History    Not on file   Tobacco Use    Smoking status: Former Smoker     Packs/day: 0.50     Years: 35.00     Pack years: 17.50     Types: Cigarettes     Quit date: 2018     Years since quittin.9    Smokeless tobacco: Never Used    Tobacco comment: 5/13/15 has not smoked since hospitalization - kh   Vaping Use    Vaping Use: Never used   Substance and Sexual Activity    Alcohol use: No     Alcohol/week: 0.0 standard drinks    Drug use: No    Sexual activity: Not Currently     Partners: Male   Other Topics Concern    Not on file   Social History Narrative    Not on file     Social Determinants of Health     Financial Resource Strain:     Difficulty of Paying Living Expenses:    Food Insecurity:     Worried About Running Out of Food in the Last Year:     [address] in the Last Year:    Transportation Needs:     Lack of Transportation (Medical):      Lack of Transportation (Non-Medical):    Physical Activity:     Days of Exercise per Week:     Minutes of Exercise per Session:    Stress:     Feeling of Stress :    Social Connections:     Frequency of Communication with Friends and Family:     Frequency of Social Gatherings with Friends and Family:     Attends [employer] Services:     Active Member of Clubs or Organizations:     Attends Club or Organization Meetings:     Marital Status:    Intimate Partner Violence:     Fear of Current or Ex-Partner:     Emotionally Abused:     Physically Abused:     Sexually Abused:        SCREENINGS             PHYSICAL EXAM    (up to 7 for level 4, 8 or more for level 5)     ED Triage Vitals [05/25/21 1158]   BP Temp Temp Source Pulse Resp SpO2 Height Weight   (!) 171/75 98.3 °F (36.8 °C) Oral 96 18 98 % -- --       General: Alert and oriented appropriately for age, No acute distress. Eye: Normal conjunctiva. Pupils equal and reactive. HENT: Oral mucosa is moist.  Normocephalic, atraumatic. Neck: Supple, no rigidity. Respiratory: Respirations even and non-labored. Lungs clear to auscultation bilaterally. Prior Port-A-Cath sites well healed. Cardiovascular: Normal rate, Regular rhythm. Intact peripheral pulses. No edema. No JVD. Gastrointestinal: Soft, Non-tender, Non-distended. Musculoskeletal: No swelling. Integumentary: Warm, Dry. No rashes. Neurologic: Alert and appropriate for age. Cranial nerves II through XII intact. Strength 5-5 throughout. Sensation intact throughout. Steady gait. No ataxia. No focal deficits. Psychiatric: Cooperative. DIAGNOSTIC RESULTS         RADIOLOGY:   Non-plain filmimages such as CT, Ultrasound and MRI are read by the radiologist. Plain radiographic images are visualized and preliminarily interpreted by the emergency physician with the below findings:      Interpretation per the Radiologist below, if available at the time ofthis note:    CT HEAD WO CONTRAST   Final Result   No acute intracranial abnormality.                EMERGENCY DEPARTMENT COURSE and DIFFERENTIAL DIAGNOSIS/MDM:   Vitals:    Vitals:    05/25/21 1158   BP: (!) 171/75   Pulse: 96   Resp: 18   Temp: 98.3 °F (36.8 °C)   TempSrc: Oral   SpO2: 98%         Medical decision makin-year-old female with history of chronic migraines who presents to the emergency department with headache that is overall similar to her prior migraine headaches. She states it is slightly different from priors, CT brain obtained to ensure no large intracranial mass lesion, hydrocephalus as she does have some vomiting. But no red flag features of any subarachnoid hemorrhage or intracerebral infection. CT negative acute, agree with radiology interpretation. Pain resolved with Compazine, Toradol, acetaminophen as given. Patient remains neurologically intact, hemodynamically stable, stable for and amenable to discharge home. Medications   prochlorperazine (COMPAZINE) injection 10 mg (10 mg Intravenous Given 21 1230)   diphenhydrAMINE (BENADRYL) injection 12.5 mg (12.5 mg Intravenous Given 21 1226)   ketorolac (TORADOL) injection 15 mg (15 mg Intravenous Given 21 1228)   acetaminophen (TYLENOL) tablet 1,000 mg (1,000 mg Oral Given 21 1231)   dexamethasone (PF) (DECADRON) injection 8 mg (8 mg Intravenous Given 21 1229)     I estimate there is LOW risk for (including but not limited to) BACTERIAL MENINGITIS, ISCHEMIC OR HEMORRHAGE CVA (Ex. SAH), VASCULAR DISSECTION, TEMPORAL ARTERITIS, or ACUTE CORONARY SYNDROME thus I consider the discharge disposition reasonable. Alron Nela (or their surrogate) and I have discussed the diagnosis and risks, and we agree with discharging home with close follow-up. We also discussed returning to the Emergency Department immediately if new or worsening symptoms occur. We have discussed the symptoms which are most concerning that necessitate immediate return. FINAL IMPRESSION      1. Other migraine with status migrainosus, not intractable          DISPOSITION/PLAN   DISPOSITION  Discharged home.       PATIENT REFERRED TO:  Fritz Lara MD  [address] [facility]    In 1 week      [facility] Emergency Department  [address] [facility]  [phone]    If symptoms worsen    your neurologist    In 1 week        DISCHARGE MEDICATIONS:  Discharge Medication List as of 5/25/2021  2:08 PM             (Please note that portions of this note were completed with a voice recognition program.Efforts were made to edit the dictations but occasionally words are mis-transcribed.)    Graeme Cardona MD (electronically signed)  Attending Emergency Physician         Graeme Cardona MD  05/26/21 5039

## 2021-05-25 NOTE — ED NOTES
Pt discharged from ED to home. Pt verbalizes understanding to discharge instructions, teach back successful. Pt denies questions at this time. No acute distress noted. Resp even and unlabored. A/ox4. Pt instructed to follow-up as noted - return to ED if symptoms worsen. Pt verbalizes understanding. Discharged per ED MD with discharge instructions. Pt refuses ambulatory assistance to lobby and walks with steady gait.         Joey Salazar RN  05/25/21 5956

## 2021-06-01 NOTE — TELEPHONE ENCOUNTER
Medication:   Requested Prescriptions     Pending Prescriptions Disp Refills    ondansetron (ZOFRAN) 4 MG tablet [Pharmacy Med Name: ONDANSETRON HCL 4 MG TABS 4 Tablet] 21 tablet 1     Sig: TAKE 1 TABLET BY MOUTH EVERY 8 HOURS AS NEEDED FOR NAUSEA OR VOMITING        Last Filled:      Patient Phone Number: [phone] (home)     Last appt: 3/1/2021   Next appt: Visit date not found    Last OARRS:   RX Monitoring 4/9/2019   Attestation The Prescription Monitoring Report for this patient was reviewed today.

## 2021-06-02 RX ORDER — ONDANSETRON 4 MG/1
4 TABLET, FILM COATED ORAL EVERY 8 HOURS PRN
Qty: 21 TABLET | Refills: 1 | Status: SHIPPED | OUTPATIENT
Start: 2021-06-02 | End: 2021-07-20 | Stop reason: SDUPTHER

## 2021-06-11 ENCOUNTER — VIRTUAL VISIT (OUTPATIENT)
Dept: PAIN MANAGEMENT | Age: 66
End: 2021-06-11
Payer: COMMERCIAL

## 2021-06-11 DIAGNOSIS — M75.81 ROTATOR CUFF TENDONITIS, RIGHT: ICD-10-CM

## 2021-06-11 DIAGNOSIS — M79.7 FIBROMYALGIA: ICD-10-CM

## 2021-06-11 DIAGNOSIS — M51.36 DDD (DEGENERATIVE DISC DISEASE), LUMBAR: ICD-10-CM

## 2021-06-11 DIAGNOSIS — G89.4 CHRONIC PAIN SYNDROME: ICD-10-CM

## 2021-06-11 DIAGNOSIS — M50.30 DDD (DEGENERATIVE DISC DISEASE), CERVICAL: ICD-10-CM

## 2021-06-11 DIAGNOSIS — M75.81 TENDINITIS OF RIGHT ROTATOR CUFF: ICD-10-CM

## 2021-06-11 DIAGNOSIS — E11.40 CHRONIC PAINFUL DIABETIC NEUROPATHY (HCC): ICD-10-CM

## 2021-06-11 PROCEDURE — 99213 OFFICE O/P EST LOW 20 MIN: CPT | Performed by: INTERNAL MEDICINE

## 2021-06-11 PROCEDURE — 2022F DILAT RTA XM EVC RTNOPTHY: CPT | Performed by: INTERNAL MEDICINE

## 2021-06-11 PROCEDURE — 3017F COLORECTAL CA SCREEN DOC REV: CPT | Performed by: INTERNAL MEDICINE

## 2021-06-11 PROCEDURE — G8427 DOCREV CUR MEDS BY ELIG CLIN: HCPCS | Performed by: INTERNAL MEDICINE

## 2021-06-11 PROCEDURE — 3052F HG A1C>EQUAL 8.0%<EQUAL 9.0%: CPT | Performed by: INTERNAL MEDICINE

## 2021-06-11 RX ORDER — QUETIAPINE FUMARATE 25 MG/1
25 TABLET, FILM COATED ORAL NIGHTLY
Qty: 60 TABLET | Refills: 3 | Status: SHIPPED | OUTPATIENT
Start: 2021-06-11 | End: 2021-07-21 | Stop reason: SDUPTHER

## 2021-06-11 RX ORDER — OXYCODONE AND ACETAMINOPHEN 7.5; 325 MG/1; MG/1
1 TABLET ORAL EVERY 6 HOURS PRN
Qty: 105 TABLET | Refills: 0 | Status: SHIPPED | OUTPATIENT
Start: 2021-06-11 | End: 2021-07-21 | Stop reason: SDUPTHER

## 2021-06-11 RX ORDER — RIZATRIPTAN BENZOATE 10 MG/1
10 TABLET ORAL
Qty: 9 TABLET | Refills: 1 | Status: SHIPPED | OUTPATIENT
Start: 2021-06-11 | End: 2021-07-21 | Stop reason: ALTCHOICE

## 2021-06-11 RX ORDER — DULOXETIN HYDROCHLORIDE 60 MG/1
60 CAPSULE, DELAYED RELEASE ORAL DAILY
Qty: 30 CAPSULE | Refills: 1 | Status: SHIPPED | OUTPATIENT
Start: 2021-06-11 | End: 2021-07-21 | Stop reason: SDUPTHER

## 2021-06-11 NOTE — PROGRESS NOTES
delayed release capsule TAKE 1 CAPSULE BY MOUTH DAILY 30 capsule 1    insulin glargine (BASAGLAR KWIKPEN) 100 UNIT/ML injection pen Inject 8 Units into the skin 2 times daily  5 pen 3    metoprolol succinate (TOPROL XL) 50 MG extended release tablet TAKE 0.5 TABLETS BY MOUTH 2 TIMES DAILY (WITH MEALS) (Patient taking differently: Take 25 mg by mouth daily ) 30 tablet 5    aspirin 81 MG chewable tablet Take 1 tablet by mouth daily 30 tablet 3    dicyclomine (BENTYL) 20 MG tablet Take 20 mg by mouth 4 times daily (before meals and nightly)       nitroGLYCERIN (NITROSTAT) 0.4 MG SL tablet Place 1 tablet under the tongue every 5 minutes as needed for Chest pain up to max of 3 total doses. If no relief after 1 dose, call 911. 25 tablet 3    Nutritional Supplements (ENSURE) LIQD Take 1 Can by mouth 3 times daily as needed (nutrition) 90 Can 5    atorvastatin (LIPITOR) 80 MG tablet Take 1 tablet by mouth nightly 90 tablet 5    amLODIPine (NORVASC) 5 MG tablet Take 1 tablet by mouth 2 times daily 180 tablet 5    blood glucose test strips (TRUE METRIX BLOOD GLUCOSE TEST) strip 1 each by Does not apply route 2 times daily 100 each 5    clopidogrel (PLAVIX) 75 MG tablet TAKE 1 TABLET BY MOUTH DA JULIEN 90 tablet 5    albuterol (PROVENTIL) (2.5 MG/3ML) 0.083% nebulizer solution Take 3 mLs by nebulization every 4 hours as needed for Wheezing 120 each 5    budesonide-formoterol (SYMBICORT) 160-4.5 MCG/ACT AERO Inhale 2 puffs into the lungs daily 2 Inhaler 5    albuterol sulfate HFA (VENTOLIN HFA) 108 (90 Base) MCG/ACT inhaler Inhale 2 puffs into the lungs every 4 hours as needed for Wheezing or Shortness of Breath 3 Inhaler 5    ranolazine (RANEXA) 1000 MG extended release tablet Take 1 tablet by mouth 2 times daily 60 tablet 8    rizatriptan (MAXALT) 10 MG tablet Take 1 tablet by mouth once as needed for Migraine May repeat in 2 hours if needed 9 tablet 0     No facility-administered medications prior to visit. REVIEW OF SYSTEMS:    Respiratory: Negative for apnea, chest tightness and shortness of breath or change in baseline breathing. PHYSICAL EXAM:   Nursing note and vitals reviewed. There were no vitals taken for this visit. Constitutional: She appears well-developed and well-nourished. No acute distress. Cardiovascular: Normal rate, regular rhythm, normal heart sounds, and does not have murmur. Pulmonary/Chest: Effort normal. No respiratory distress. She does not have wheezes in the lung fields. She has no rales. Neurological/Psychiatric:She is alert and oriented to person, place, and time. Coordination is  normal.  Her mood isAppropriate and affect is Flat/blunted and Anxious . Her    IMPRESSION:   1. Chronic pain syndrome    2. Chronic painful diabetic neuropathy ([address]    3. Fibromyalgia    4. DDD (degenerative disc disease), lumbar    5. Chronic migraine    6. DDD (degenerative disc disease), cervical    7. Rotator cuff tendonitis, right    8. Tendinitis of right rotator cuff        PLAN:  Informed verbal consent was obtained  -she was advised  to avoid using too many OTC analgesics to control the headaches, avoid chocolates, increased caffeine, cheeses and MSG nitrite containing foods, cigarette smoking. To avoid bright lights, strong smells and skipping meals.    -ROM/Stretching exercises as advised   -She was advised to increase fluids ( 5-7  glasses of fluid daily), limit caffeine, avoid cheese products, increase dietary fiber, increase activity and exercise as tolerated and relax regularly and enjoy meals   -advised to walk 20-30 minutes daily as tolerated      Current Outpatient Medications   Medication Sig Dispense Refill    ondansetron (ZOFRAN) 4 MG tablet TAKE 1 TABLET BY MOUTH EVERY 8 HOURS AS NEEDED FOR NAUSEA OR VOMITING 21 tablet 1    tiZANidine (ZANAFLEX) 4 MG tablet Take 1/2-1 tablet po BID 60 tablet 0    QUEtiapine (SEROQUEL) 25 MG tablet Take 1 tablet by mouth nightly 60 tablet 3    oxyCODONE-acetaminophen (PERCOCET) 7.5-325 MG per tablet Take 1 tablet by mouth every 6 hours as needed for Pain (max 3-4 day) for up to 28 days. 84 tablet 0    DULoxetine (CYMBALTA) 60 MG extended release capsule Take 1 capsule by mouth daily 30 capsule 1    Erenumab-aooe 140 MG/ML SOAJ Inject 140 mLs into the skin every 30 days 1 pen 0    UNIFINE PENTIPS 31G X 8 MM MISC USE WITH insulin pens 100 each 5    alirocumab (PRALUENT) 75 MG/ML SOAJ injection pen Inject 1 mL into the skin every 14 days 6 pen 3    insulin aspart (NOVOLOG FLEXPEN) 100 UNIT/ML injection pen Inject 3 Units into the skin 3 times daily (before meals)      torsemide (DEMADEX) 10 MG tablet Take 10 mg by mouth daily      omeprazole (PRILOSEC) 20 MG delayed release capsule TAKE 1 CAPSULE BY MOUTH DAILY 30 capsule 1    insulin glargine (BASAGLAR KWIKPEN) 100 UNIT/ML injection pen Inject 8 Units into the skin 2 times daily  5 pen 3    metoprolol succinate (TOPROL XL) 50 MG extended release tablet TAKE 0.5 TABLETS BY MOUTH 2 TIMES DAILY (WITH MEALS) (Patient taking differently: Take 25 mg by mouth daily ) 30 tablet 5    aspirin 81 MG chewable tablet Take 1 tablet by mouth daily 30 tablet 3    dicyclomine (BENTYL) 20 MG tablet Take 20 mg by mouth 4 times daily (before meals and nightly)       nitroGLYCERIN (NITROSTAT) 0.4 MG SL tablet Place 1 tablet under the tongue every 5 minutes as needed for Chest pain up to max of 3 total doses.  If no relief after 1 dose, call 911. 25 tablet 3    Nutritional Supplements (ENSURE) LIQD Take 1 Can by mouth 3 times daily as needed (nutrition) 90 Can 5    atorvastatin (LIPITOR) 80 MG tablet Take 1 tablet by mouth nightly 90 tablet 5    amLODIPine (NORVASC) 5 MG tablet Take 1 tablet by mouth 2 times daily 180 tablet 5    blood glucose test strips (TRUE METRIX BLOOD GLUCOSE TEST) strip 1 each by Does not apply route 2 times daily 100 each 5    clopidogrel (PLAVIX) 75 MG tablet TAKE 1 TABLET BY MOUTH physical functioning. - she is showing progression towards this treatment goal with the current regimen. She was advised against drinking alcohol with the narcotic pain medicines, advised against driving or handling machinery while adjusting the dose of medicines or if having cognitive  issues related to the current medications. Risk of overdose and death, if medicines not taken as prescribed, were also discussed. If the patient develops new symptoms or if the symptoms worsen, the patient should call the office. While transcribing every attempt was made to maintain the accuracy of the note in terms of it's contents,there may have been some errors made inadvertently. Thank you for allowing me to participate in the care of this patient.     Devon Michaels MD.    Cc: Skye Calle MD

## 2021-06-19 ENCOUNTER — HOSPITAL ENCOUNTER (INPATIENT)
Age: 66
LOS: 2 days | Discharge: HOME HEALTH | DRG: 392 | End: 2021-06-21
Attending: EMERGENCY MEDICINE | Admitting: STUDENT IN AN ORGANIZED HEALTH CARE EDUCATION/TRAINING PROGRAM
Payer: COMMERCIAL

## 2021-06-19 ENCOUNTER — APPOINTMENT (OUTPATIENT)
Dept: GENERAL RADIOLOGY | Age: 66
DRG: 392 | End: 2021-06-19
Payer: COMMERCIAL

## 2021-06-19 DIAGNOSIS — Z86.39 HISTORY OF DIABETES MELLITUS: ICD-10-CM

## 2021-06-19 DIAGNOSIS — Z86.79 HISTORY OF HEART FAILURE: ICD-10-CM

## 2021-06-19 DIAGNOSIS — Z87.448 HISTORY OF KIDNEY DISEASE: ICD-10-CM

## 2021-06-19 DIAGNOSIS — Z86.79 HISTORY OF CORONARY ARTERY DISEASE: ICD-10-CM

## 2021-06-19 DIAGNOSIS — R07.9 CHEST PAIN, UNSPECIFIED TYPE: Primary | ICD-10-CM

## 2021-06-19 LAB
ANION GAP SERPL CALCULATED.3IONS-SCNC: 10 MMOL/L (ref 3–16)
APTT: 38.3 SEC (ref 24.2–36.2)
BASOPHILS ABSOLUTE: 0 K/UL (ref 0–0.2)
BASOPHILS RELATIVE PERCENT: 0.3 %
BUN BLDV-MCNC: 18 MG/DL (ref 7–20)
CALCIUM SERPL-MCNC: 9.7 MG/DL (ref 8.3–10.6)
CHLORIDE BLD-SCNC: 100 MMOL/L (ref 99–110)
CO2: 25 MMOL/L (ref 21–32)
CREAT SERPL-MCNC: 1.5 MG/DL (ref 0.6–1.2)
EOSINOPHILS ABSOLUTE: 0.1 K/UL (ref 0–0.6)
EOSINOPHILS RELATIVE PERCENT: 2.1 %
GFR AFRICAN AMERICAN: 42
GFR NON-AFRICAN AMERICAN: 35
GLUCOSE BLD-MCNC: 248 MG/DL (ref 70–99)
HCT VFR BLD CALC: 31.1 % (ref 36–48)
HEMOGLOBIN: 10.5 G/DL (ref 12–16)
INR BLD: 1.03 (ref 0.86–1.14)
LYMPHOCYTES ABSOLUTE: 1.3 K/UL (ref 1–5.1)
LYMPHOCYTES RELATIVE PERCENT: 22.3 %
MCH RBC QN AUTO: 32.5 PG (ref 26–34)
MCHC RBC AUTO-ENTMCNC: 33.9 G/DL (ref 31–36)
MCV RBC AUTO: 95.8 FL (ref 80–100)
MONOCYTES ABSOLUTE: 0.3 K/UL (ref 0–1.3)
MONOCYTES RELATIVE PERCENT: 5.4 %
NEUTROPHILS ABSOLUTE: 4 K/UL (ref 1.7–7.7)
NEUTROPHILS RELATIVE PERCENT: 69.9 %
PDW BLD-RTO: 14.4 % (ref 12.4–15.4)
PLATELET # BLD: 188 K/UL (ref 135–450)
PMV BLD AUTO: 8.2 FL (ref 5–10.5)
POTASSIUM REFLEX MAGNESIUM: 4.9 MMOL/L (ref 3.5–5.1)
PRO-BNP: 515 PG/ML (ref 0–124)
PROTHROMBIN TIME: 12 SEC (ref 10–13.2)
RBC # BLD: 3.24 M/UL (ref 4–5.2)
SODIUM BLD-SCNC: 135 MMOL/L (ref 136–145)
TROPONIN: <0.01 NG/ML
TROPONIN: <0.01 NG/ML
WBC # BLD: 5.8 K/UL (ref 4–11)

## 2021-06-19 PROCEDURE — 80048 BASIC METABOLIC PNL TOTAL CA: CPT

## 2021-06-19 PROCEDURE — 99285 EMERGENCY DEPT VISIT HI MDM: CPT

## 2021-06-19 PROCEDURE — 93005 ELECTROCARDIOGRAM TRACING: CPT | Performed by: EMERGENCY MEDICINE

## 2021-06-19 PROCEDURE — 84484 ASSAY OF TROPONIN QUANT: CPT

## 2021-06-19 PROCEDURE — 83880 ASSAY OF NATRIURETIC PEPTIDE: CPT

## 2021-06-19 PROCEDURE — 85025 COMPLETE CBC W/AUTO DIFF WBC: CPT

## 2021-06-19 PROCEDURE — 6370000000 HC RX 637 (ALT 250 FOR IP): Performed by: EMERGENCY MEDICINE

## 2021-06-19 PROCEDURE — 85730 THROMBOPLASTIN TIME PARTIAL: CPT

## 2021-06-19 PROCEDURE — 83036 HEMOGLOBIN GLYCOSYLATED A1C: CPT

## 2021-06-19 PROCEDURE — 36415 COLL VENOUS BLD VENIPUNCTURE: CPT

## 2021-06-19 PROCEDURE — 2060000000 HC ICU INTERMEDIATE R&B

## 2021-06-19 PROCEDURE — 85610 PROTHROMBIN TIME: CPT

## 2021-06-19 PROCEDURE — 71046 X-RAY EXAM CHEST 2 VIEWS: CPT

## 2021-06-19 RX ORDER — ONDANSETRON 4 MG/1
8 TABLET, ORALLY DISINTEGRATING ORAL ONCE
Status: COMPLETED | OUTPATIENT
Start: 2021-06-19 | End: 2021-06-19

## 2021-06-19 RX ORDER — ACETAMINOPHEN 325 MG/1
650 TABLET ORAL ONCE
Status: COMPLETED | OUTPATIENT
Start: 2021-06-19 | End: 2021-06-19

## 2021-06-19 RX ORDER — ONDANSETRON 2 MG/ML
8 INJECTION INTRAMUSCULAR; INTRAVENOUS ONCE
Status: DISCONTINUED | OUTPATIENT
Start: 2021-06-19 | End: 2021-06-19

## 2021-06-19 RX ORDER — NITROGLYCERIN 20 MG/100ML
10 INJECTION INTRAVENOUS CONTINUOUS
Status: DISCONTINUED | OUTPATIENT
Start: 2021-06-19 | End: 2021-06-21 | Stop reason: HOSPADM

## 2021-06-19 RX ORDER — ASPIRIN 81 MG/1
243 TABLET, CHEWABLE ORAL ONCE
Status: COMPLETED | OUTPATIENT
Start: 2021-06-19 | End: 2021-06-19

## 2021-06-19 RX ORDER — OXYCODONE HYDROCHLORIDE 5 MG/1
5 TABLET ORAL ONCE
Status: COMPLETED | OUTPATIENT
Start: 2021-06-19 | End: 2021-06-19

## 2021-06-19 RX ADMIN — ONDANSETRON 8 MG: 4 TABLET, ORALLY DISINTEGRATING ORAL at 18:54

## 2021-06-19 RX ADMIN — ASPIRIN 81 MG 243 MG: 81 TABLET ORAL at 18:11

## 2021-06-19 RX ADMIN — ACETAMINOPHEN 650 MG: 325 TABLET ORAL at 18:51

## 2021-06-19 RX ADMIN — OXYCODONE 5 MG: 5 TABLET ORAL at 18:52

## 2021-06-19 ASSESSMENT — ENCOUNTER SYMPTOMS
SHORTNESS OF BREATH: 1
COUGH: 0
DIARRHEA: 0
CONSTIPATION: 0
EYES NEGATIVE: 1

## 2021-06-19 ASSESSMENT — PAIN DESCRIPTION - ORIENTATION: ORIENTATION: LEFT

## 2021-06-19 ASSESSMENT — PAIN SCALES - GENERAL
PAINLEVEL_OUTOF10: 8

## 2021-06-19 ASSESSMENT — HEART SCORE: ECG: 1

## 2021-06-19 ASSESSMENT — PAIN DESCRIPTION - PAIN TYPE: TYPE: ACUTE PAIN

## 2021-06-19 ASSESSMENT — PAIN DESCRIPTION - LOCATION
LOCATION: CHEST
LOCATION: CHEST

## 2021-06-19 NOTE — ED TRIAGE NOTES
Polo Browne is a 60 y.o. female was brought by [facility] for Chest Pain that started about an hour ago while she was doing dishes. The patient states that she is having left sided chest pain that wraps around into her back. The patient states the pain was a 10/10 before she took ASA and Nitro x 3. The pain is now a 8/10. The patient is alert and oriented with an open and patent airway with a normal respiratory effort. EKG was preformed and patient placed on cardiac monitoring.

## 2021-06-19 NOTE — ED NOTES
IV access and blood work was attempted multiple times and unsuccessful.  ED provider notified      Annel Amin RN  06/19/21 2489

## 2021-06-19 NOTE — ED PROVIDER NOTES
Review of Systems   Constitutional: Negative for appetite change, chills and fever. HENT: Negative for congestion. Eyes: Negative. Respiratory: Positive for shortness of breath. Negative for cough. Cardiovascular: Positive for chest pain. Negative for leg swelling. Gastrointestinal: Negative for constipation and diarrhea. Has noticed some swelling in her stomach   Genitourinary: Negative. Musculoskeletal: Positive for gait problem (sometimse feels off balance, chronic). Skin: Negative for rash. Neurological: Negative for syncope. All other systems reviewed and are negative.       PAST MEDICAL HISTORY     Past Medical History:   Diagnosis Date    Acid reflux     Anemia     Anxiety     Arthritis     Asthma     [facility]     Blood transfusion reaction     CAD (coronary artery disease) 12/3/2012    Cerebral artery occlusion with cerebral infarction [facility])     right sided weakness & headache    CH[facility]congestive heart failure) [facility])     Chronic kidney disease     40% kidney functiom    COPD (chronic obstructive pulmonary disease) ([facility])     Depression     DM2 (diabetes mellitus, type 2) ([facility]     Dysarthria     Fibromyalgia 6/7/2016    Headache(784.0) 2/19/2013    Hemisensory loss     Hyperlipidemia     Hypertension     IBS (irritable bowel syndrome)     Inferior vena cava occlusion (HCC)     Irritable bowel syndrome     Keratitis     MI, old     Neuropathy     Superior vena cava obstruction     Temporal arteritis ([facility] 2/24/2014       SURGICALHISTORY       Past Surgical History:   Procedure Laterality Date    ABLATION OF DYSRHYTHMIC FOCUS  11/2020    ARTERY BIOPSY Right 04/23/2014    RIGHT TEMPORAL ARTERY BIOPSY    CAROTID ARTERY SURGERY Left     clean up per pt    CATARACT REMOVAL Bilateral     CHOLECYSTECTOMY      COLONOSCOPY N/A 4/9/2021    COLONOSCOPY WITH BIOPSY performed by Jodi Jovel MD at [address] WITH STENT PLACEMENT  2020    HYSTERECTOMY      JOINT REPLACEMENT Right     KNEE ARTHROSCOPY Right     PORT SURGERY      x`s 4 & removal of 4    PTCA  10/2019    LAD and RCA inrtervention    TUNNELED VENOUS PORT PLACEMENT      left thigh.   SMART PORT-----Removed    UPPER GASTROINTESTINAL ENDOSCOPY N/A 7/6/2020    EGD DIAGNOSTIC ONLY performed by Kasie Puckett MD at [facility]       Previous Medications    ALBUTEROL (PROVENTIL) (2.5 MG/3ML) 0.083% NEBULIZER SOLUTION    Take 3 mLs by nebulization every 4 hours as needed for Wheezing    ALBUTEROL SULFATE HFA (VENTOLIN HFA) 108 (90 BASE) MCG/ACT INHALER    Inhale 2 puffs into the lungs every 4 hours as needed for Wheezing or Shortness of Breath    ALIROCUMAB (PRALUENT) 75 MG/ML SOAJ INJECTION PEN    Inject 1 mL into the skin every 14 days    AMLODIPINE (NORVASC) 5 MG TABLET    Take 1 tablet by mouth 2 times daily    ASPIRIN 81 MG CHEWABLE TABLET    Take 1 tablet by mouth daily    ATORVASTATIN (LIPITOR) 80 MG TABLET    Take 1 tablet by mouth nightly    BLOOD GLUCOSE TEST STRIPS (TRUE METRIX BLOOD GLUCOSE TEST) STRIP    1 each by Does not apply route 2 times daily    BUDESONIDE-FORMOTEROL (SYMBICORT) 160-4.5 MCG/ACT AERO    Inhale 2 puffs into the lungs daily    CLOPIDOGREL (PLAVIX) 75 MG TABLET    TAKE 1 TABLET BY MOUTH DA JULIEN    DICYCLOMINE (BENTYL) 20 MG TABLET    Take 20 mg by mouth 4 times daily (before meals and nightly)     DULOXETINE (CYMBALTA) 60 MG EXTENDED RELEASE CAPSULE    Take 1 capsule by mouth daily    ERENUMAB-AOOE 140 MG/ML SOAJ    Inject 140 mLs into the skin every 30 days    INSULIN ASPART (NOVOLOG FLEXPEN) 100 UNIT/ML INJECTION PEN    Inject 3 Units into the skin 3 times daily (before meals)    INSULIN GLARGINE (BASAGLAR KWIKPEN) 100 UNIT/ML INJECTION PEN    Inject 8 Units into the skin 2 times daily     METOPROLOL SUCCINATE (TOPROL XL) 50 MG EXTENDED RELEASE TABLET    TAKE 0.5 TABLETS BY MOUTH 2 TIMES DAILY (WITH MEALS)    NITROGLYCERIN (NITROSTAT) 0.4 MG SL TABLET    Place 1 tablet under the tongue every 5 minutes as needed for Chest pain up to max of 3 total doses. If no relief after 1 dose, call 911. NUTRITIONAL SUPPLEMENTS (ENSURE) LIQD    Take 1 Can by mouth 3 times daily as needed (nutrition)    OMEPRAZOLE (PRILOSEC) 20 MG DELAYED RELEASE CAPSULE    TAKE 1 CAPSULE BY MOUTH DAILY    ONDANSETRON (ZOFRAN) 4 MG TABLET    TAKE 1 TABLET BY MOUTH EVERY 8 HOURS AS NEEDED FOR NAUSEA OR VOMITING    OXYCODONE-ACETAMINOPHEN (PERCOCET) 7.5-325 MG PER TABLET    Take 1 tablet by mouth every 6 hours as needed for Pain (max 3 day) for up to 35 days. QUETIAPINE (SEROQUEL) 25 MG TABLET    Take 1 tablet by mouth nightly    RANOLAZINE (RANEXA) 1000 MG EXTENDED RELEASE TABLET    Take 1 tablet by mouth 2 times daily    RIZATRIPTAN (MAXALT) 10 MG TABLET    Take 1 tablet by mouth once as needed for Migraine May repeat in 2 hours if needed    TIZANIDINE (ZANAFLEX) 4 MG TABLET    Take 1/2-1 tablet po BID    TORSEMIDE (DEMADEX) 10 MG TABLET    Take 10 mg by mouth daily    UNIFINE PENTIPS 31G X 8 MM MISC    USE WITH insulin pens      ALLERGIES     Patient has no known allergies. FAMILY HISTORY       Family History   Problem Relation Age of Onset    Diabetes Mother     Hypertension Mother     High Cholesterol Mother     Stroke Mother     Cancer Mother     No Known Problems Paternal Grandfather         lung issues      SOCIAL HISTORY       Social History     Socioeconomic History    Marital status:       Spouse name: None    Number of children: 6    Years of education: None    Highest education level: None   Occupational History    None   Tobacco Use    Smoking status: Former Smoker     Packs/day: 0.50     Years: 35.00     Pack years: 17.50     Types: Cigarettes     Quit date: 2018     Years since quittin.9    Smokeless tobacco: Never Used    Tobacco comment: 5/13/15 has not smoked since hospitalization -    Vaping Use    Vaping Use: Never used   Substance and Sexual Activity    Alcohol use: No     Alcohol/week: 0.0 standard drinks    Drug use: No    Sexual activity: Not Currently     Partners: Male   Other Topics Concern    None   Social History Narrative    None     Social Determinants of Health     Financial Resource Strain:     Difficulty of Paying Living Expenses:    Food Insecurity:     Worried About Running Out of Food in the Last Year:     Ran Out of Food in the Last Year:    Transportation Needs:     Lack of Transportation (Medical):  Lack of Transportation (Non-Medical):    Physical Activity:     Days of Exercise per Week:     Minutes of Exercise per Session:    Stress:     Feeling of Stress :    Social Connections:     Frequency of Communication with Friends and Family:     Frequency of Social Gatherings with Friends and Family:     Attends Denominational Services:     Active Member of Clubs or Organizations:     Attends Club or Organization Meetings:     Marital Status:    Intimate Partner Violence:     Fear of Current or Ex-Partner:     Emotionally Abused:     Physically Abused:     Sexually Abused:      SCREENINGS         PHYSICAL EXAM  (up to 7 for level 4, 8 or more for level 5)   INITIAL VITALS: BP: (!) 177/77, Temp: 98.1 °F (36.7 °C), Pulse: 61, Resp: 14, SpO2: 100 %   Physical Exam  Constitutional:       Appearance: She is not toxic-appearing or diaphoretic. HENT:      Head: Normocephalic and atraumatic. Right Ear: External ear normal.      Left Ear: External ear normal.   Eyes:      General: No scleral icterus. Right eye: No discharge. Left eye: No discharge. Conjunctiva/sclera: Conjunctivae normal.   Neck:      Trachea: No tracheal deviation. Cardiovascular:      Rate and Rhythm: Normal rate. Pulses:           Radial pulses are 1+ on the right side and 1+ on the left side.    Pulmonary:      Effort: Pulmonary effort is [facility]) [place] [address]   Phone [phone]   BRAIN NATRIURETIC PEPTIDE - Abnormal; Notable for the following components:    Pro- (*)     All other components within normal limits    Narrative:     Performed at:  [facility]  [address] [facility] [place] [address]   Phone [phone]   TROPONIN    Narrative:     Performed at:  [facility]  [address] [facility] [place] [address]   Phone [phone]   TROPONIN    Narrative:     Performed at:  [facility]  [address] [facility] [place] [address]   Phone [phone]       EMERGENCY DEPARTMENT COURSE and DIFFERENTIAL DIAGNOSIS/MDM:   Patient was given the following medications:  Orders Placed This Encounter   Medications    aspirin chewable tablet 243 mg    DISCONTD: ondansetron (ZOFRAN) injection 8 mg    ondansetron (ZOFRAN-ODT) disintegrating tablet 8 mg    oxyCODONE (ROXICODONE) immediate release tablet 5 mg    acetaminophen (TYLENOL) tablet 650 mg     CONSULTS:  IP CONSULT TO CARDIOLOGY    INITIAL VITALS: BP: (!) 177/77, Temp: 98.1 °F (36.7 °C), Pulse: 61, Resp: 14, SpO2: 100 %   RECENT VITALS:  BP: (!) 182/78,Temp: 98.1 °F (36.7 °C), Pulse: 50, Resp: 16, SpO2: 100 %     Juancho Cortez is a 60 y.o. female who presents to the emergency department secondary to concern for chest pain with a past medical history concerning for prior MI and having had stents placed. On arrival she is awake, alert, oriented. Vitals notable for blood pressure 177/77 and otherwise HDS and wnl. She answers questions in complete sentences. Lungs are clear. Does have diminished pulses though they are equal bilaterally 1+. No peripheral edema noted. Abdomen benign.      With her history of heart failure she states her weight has not increased and she is stable; however, when I do last weights in our medical record it is noted she has put on about 12 pounds since April/May. Wt Readings from Last 3 Encounters:   06/19/21 134 lb 7.7 oz (61 kg)   05/11/21 121 lb (54.9 kg)   04/20/21 122 lb 9.6 oz (55.6 kg)     A peripheral IV was placed, labs were ordered along with EKG, aspirin, Zofran. EKG without significant change compared to prior. Chest XR unremarkable. Labs including troponin x 2 negative. Review of medical record shows on November 30 at [facility] she had a left heart cath with:  Cardiac Diagnostics  Findings:     This is a right dominant coronary arterial system     Left Main coronary artery: luminal irregularities  Left anterior descending coronary artery: patent proximal and distal stents  Left circumflex coronary artery: patent mid stent, OM1: mid stent with mid stent fracture and focal 70% in-stent restenosis, FFR = 0.66, medial branch of OM1 is in-stent prison and has 70-80% ostial stenosis (2 mm diameter vessel)  Right coronary artery: patent proximal and distal stents, RPDA: 90-95% ostial stenosis  Left ventriculogram: normal wall motion, LVEF = 75%  LVEDP: 5 mmHg  Aortic pressure: 90/50 mmHg     Culprit vessel: OM1  YAKELIN flow pre-intervention: 3  YAKELIN flow post-intervention: 3  Percent stenosis pre-intervention: 70  Percent stenosis post-intervention: 10     Culprit vessel: RPDA  YAKELIN flow pre-intervention: 3  YAKELIN flow post-intervention: 3  Percent stenosis pre-intervention: 95  Percent stenosis post-intervention: 0           Impression:   3 Vessel CAD  Successful POBA OM1 with 3 mm balloon  FFR OM1 = 0.66  Successful PCI RPDA w/ 2.5 x 12 mm Avery DEANGELO  Patent proximal and mid LAD stents  Patent mid LCX stent  Patent proximal and distal RCA stents  [id number] score: 5     Further review of medical record shows she had a stress test done in March which was negative for ischemia. It appears she was supposed to have an outpatient left heart cath which has not yet been done.     On reassessment she reports she is still having chest pain. BP remains elevated with systolic in 163Z, heart rate 50s, O2 saturation remains 100% on room air. Discussed with cardiology on-call given her work-up is negative though she has significant cardiac history and her heart score is elevated 4. Recommendation for admission. Hospitalist, Dr. Kait Cervantes, notified of request for admission and she will be admitted to the floor. CRITICAL CARE TIME   Due to the immediate potential for life-threatening deterioration due to chest pain concern for ACS, I spent 45 minutes providing critical care. There was a high probability of clinically significant/life threatening deterioration in the patient's condition which required my urgent intervention. This time excludes time spent performing procedures but includes time spent on direct patient care, history retrieval, review of the chart, and discussions with patient, family, and consultant(s). FINAL IMPRESSION      1. Chest pain, unspecified type    2. History of coronary artery disease    3. History of diabetes mellitus    4. History of heart failure    5.  History of kidney disease        DISPOSITION/PLAN   DISPOSITION  Admission            (Please note that portions of this note were completed with a voice recognition program. Efforts were made to edit the dictations but occasionally words are mis-transcribed.)    Sammi Osullivan MD (electronically signed)  Attending Emergency Physician      Sammi Osullivan MD  06/19/21 8091

## 2021-06-19 NOTE — ED NOTES
Bed: C-27  Expected date:   Expected time:   Means of arrival: [facility]  Comments:  65f Chest pain     Serena Ramires RN  06/19/21 1376

## 2021-06-20 LAB
APTT: 84.3 SEC (ref 24.2–36.2)
APTT: 91.2 SEC (ref 24.2–36.2)
APTT: 92.9 SEC (ref 24.2–36.2)
EKG ATRIAL RATE: 77 BPM
EKG DIAGNOSIS: NORMAL
EKG P AXIS: 75 DEGREES
EKG P-R INTERVAL: 140 MS
EKG Q-T INTERVAL: 420 MS
EKG QRS DURATION: 80 MS
EKG QTC CALCULATION (BAZETT): 475 MS
EKG R AXIS: 46 DEGREES
EKG T AXIS: 47 DEGREES
EKG VENTRICULAR RATE: 77 BPM
GLUCOSE BLD-MCNC: 123 MG/DL (ref 70–99)
GLUCOSE BLD-MCNC: 151 MG/DL (ref 70–99)
GLUCOSE BLD-MCNC: 181 MG/DL (ref 70–99)
GLUCOSE BLD-MCNC: 188 MG/DL (ref 70–99)
GLUCOSE BLD-MCNC: 212 MG/DL (ref 70–99)
HCT VFR BLD CALC: 29.7 % (ref 36–48)
HEMOGLOBIN: 10 G/DL (ref 12–16)
MCH RBC QN AUTO: 31.7 PG (ref 26–34)
MCHC RBC AUTO-ENTMCNC: 33.5 G/DL (ref 31–36)
MCV RBC AUTO: 94.8 FL (ref 80–100)
PDW BLD-RTO: 14.5 % (ref 12.4–15.4)
PERFORMED ON: ABNORMAL
PLATELET # BLD: 192 K/UL (ref 135–450)
PMV BLD AUTO: 8.2 FL (ref 5–10.5)
RBC # BLD: 3.13 M/UL (ref 4–5.2)
TROPONIN: <0.01 NG/ML
TROPONIN: <0.01 NG/ML
WBC # BLD: 4.2 K/UL (ref 4–11)

## 2021-06-20 PROCEDURE — 85027 COMPLETE CBC AUTOMATED: CPT

## 2021-06-20 PROCEDURE — 6360000002 HC RX W HCPCS: Performed by: INTERNAL MEDICINE

## 2021-06-20 PROCEDURE — 94640 AIRWAY INHALATION TREATMENT: CPT

## 2021-06-20 PROCEDURE — 6370000000 HC RX 637 (ALT 250 FOR IP): Performed by: NURSE PRACTITIONER

## 2021-06-20 PROCEDURE — 85730 THROMBOPLASTIN TIME PARTIAL: CPT

## 2021-06-20 PROCEDURE — 93010 ELECTROCARDIOGRAM REPORT: CPT | Performed by: INTERNAL MEDICINE

## 2021-06-20 PROCEDURE — 83036 HEMOGLOBIN GLYCOSYLATED A1C: CPT

## 2021-06-20 PROCEDURE — 6360000002 HC RX W HCPCS: Performed by: NURSE PRACTITIONER

## 2021-06-20 PROCEDURE — 36415 COLL VENOUS BLD VENIPUNCTURE: CPT

## 2021-06-20 PROCEDURE — 84484 ASSAY OF TROPONIN QUANT: CPT

## 2021-06-20 PROCEDURE — 2060000000 HC ICU INTERMEDIATE R&B

## 2021-06-20 PROCEDURE — 94760 N-INVAS EAR/PLS OXIMETRY 1: CPT

## 2021-06-20 PROCEDURE — 2500000003 HC RX 250 WO HCPCS: Performed by: NURSE PRACTITIONER

## 2021-06-20 PROCEDURE — 93005 ELECTROCARDIOGRAM TRACING: CPT | Performed by: NURSE PRACTITIONER

## 2021-06-20 RX ORDER — ACETAMINOPHEN 325 MG/1
650 TABLET ORAL EVERY 6 HOURS PRN
Status: DISCONTINUED | OUTPATIENT
Start: 2021-06-20 | End: 2021-06-21 | Stop reason: HOSPADM

## 2021-06-20 RX ORDER — HEPARIN SODIUM 1000 [USP'U]/ML
60 INJECTION, SOLUTION INTRAVENOUS; SUBCUTANEOUS PRN
Status: DISCONTINUED | OUTPATIENT
Start: 2021-06-20 | End: 2021-06-20

## 2021-06-20 RX ORDER — RANOLAZINE 500 MG/1
1000 TABLET, EXTENDED RELEASE ORAL 2 TIMES DAILY
Status: DISCONTINUED | OUTPATIENT
Start: 2021-06-20 | End: 2021-06-21 | Stop reason: HOSPADM

## 2021-06-20 RX ORDER — QUETIAPINE FUMARATE 25 MG/1
25 TABLET, FILM COATED ORAL NIGHTLY
Status: DISCONTINUED | OUTPATIENT
Start: 2021-06-20 | End: 2021-06-21 | Stop reason: HOSPADM

## 2021-06-20 RX ORDER — ALBUTEROL SULFATE 90 UG/1
2 AEROSOL, METERED RESPIRATORY (INHALATION) EVERY 4 HOURS PRN
Status: DISCONTINUED | OUTPATIENT
Start: 2021-06-20 | End: 2021-06-21 | Stop reason: HOSPADM

## 2021-06-20 RX ORDER — HEPARIN SODIUM 10000 [USP'U]/100ML
0-3000 INJECTION, SOLUTION INTRAVENOUS CONTINUOUS
Status: DISCONTINUED | OUTPATIENT
Start: 2021-06-20 | End: 2021-06-21 | Stop reason: HOSPADM

## 2021-06-20 RX ORDER — SODIUM CHLORIDE 0.9 % (FLUSH) 0.9 %
5-40 SYRINGE (ML) INJECTION PRN
Status: DISCONTINUED | OUTPATIENT
Start: 2021-06-20 | End: 2021-06-21 | Stop reason: HOSPADM

## 2021-06-20 RX ORDER — TORSEMIDE 20 MG/1
10 TABLET ORAL DAILY
Status: DISCONTINUED | OUTPATIENT
Start: 2021-06-20 | End: 2021-06-21 | Stop reason: HOSPADM

## 2021-06-20 RX ORDER — POLYETHYLENE GLYCOL 3350 17 G/17G
17 POWDER, FOR SOLUTION ORAL DAILY PRN
Status: DISCONTINUED | OUTPATIENT
Start: 2021-06-20 | End: 2021-06-21 | Stop reason: HOSPADM

## 2021-06-20 RX ORDER — SODIUM CHLORIDE 9 MG/ML
25 INJECTION, SOLUTION INTRAVENOUS PRN
Status: DISCONTINUED | OUTPATIENT
Start: 2021-06-20 | End: 2021-06-21 | Stop reason: HOSPADM

## 2021-06-20 RX ORDER — DEXTROSE MONOHYDRATE 50 MG/ML
100 INJECTION, SOLUTION INTRAVENOUS PRN
Status: DISCONTINUED | OUTPATIENT
Start: 2021-06-20 | End: 2021-06-21 | Stop reason: HOSPADM

## 2021-06-20 RX ORDER — HEPARIN SODIUM 1000 [USP'U]/ML
30 INJECTION, SOLUTION INTRAVENOUS; SUBCUTANEOUS PRN
Status: DISCONTINUED | OUTPATIENT
Start: 2021-06-20 | End: 2021-06-20

## 2021-06-20 RX ORDER — BUDESONIDE AND FORMOTEROL FUMARATE DIHYDRATE 160; 4.5 UG/1; UG/1
2 AEROSOL RESPIRATORY (INHALATION) DAILY
Status: DISCONTINUED | OUTPATIENT
Start: 2021-06-20 | End: 2021-06-21 | Stop reason: HOSPADM

## 2021-06-20 RX ORDER — DEXTROSE MONOHYDRATE 25 G/50ML
12.5 INJECTION, SOLUTION INTRAVENOUS PRN
Status: DISCONTINUED | OUTPATIENT
Start: 2021-06-20 | End: 2021-06-21 | Stop reason: HOSPADM

## 2021-06-20 RX ORDER — HEPARIN SODIUM 1000 [USP'U]/ML
3000 INJECTION, SOLUTION INTRAVENOUS; SUBCUTANEOUS ONCE
Status: COMPLETED | OUTPATIENT
Start: 2021-06-20 | End: 2021-06-20

## 2021-06-20 RX ORDER — ASPIRIN 81 MG/1
81 TABLET, CHEWABLE ORAL DAILY
Status: DISCONTINUED | OUTPATIENT
Start: 2021-06-20 | End: 2021-06-21 | Stop reason: HOSPADM

## 2021-06-20 RX ORDER — NICOTINE POLACRILEX 4 MG
15 LOZENGE BUCCAL PRN
Status: DISCONTINUED | OUTPATIENT
Start: 2021-06-20 | End: 2021-06-21 | Stop reason: HOSPADM

## 2021-06-20 RX ORDER — SODIUM CHLORIDE 0.9 % (FLUSH) 0.9 %
5-40 SYRINGE (ML) INJECTION EVERY 12 HOURS SCHEDULED
Status: DISCONTINUED | OUTPATIENT
Start: 2021-06-20 | End: 2021-06-21 | Stop reason: HOSPADM

## 2021-06-20 RX ORDER — DULOXETIN HYDROCHLORIDE 60 MG/1
60 CAPSULE, DELAYED RELEASE ORAL DAILY
Status: DISCONTINUED | OUTPATIENT
Start: 2021-06-20 | End: 2021-06-21 | Stop reason: HOSPADM

## 2021-06-20 RX ORDER — OXYCODONE AND ACETAMINOPHEN 7.5; 325 MG/1; MG/1
1 TABLET ORAL EVERY 6 HOURS PRN
Status: DISCONTINUED | OUTPATIENT
Start: 2021-06-20 | End: 2021-06-21 | Stop reason: HOSPADM

## 2021-06-20 RX ORDER — ATORVASTATIN CALCIUM 80 MG/1
80 TABLET, FILM COATED ORAL NIGHTLY
Status: DISCONTINUED | OUTPATIENT
Start: 2021-06-20 | End: 2021-06-21 | Stop reason: HOSPADM

## 2021-06-20 RX ORDER — PROMETHAZINE HYDROCHLORIDE 25 MG/ML
12.5 INJECTION, SOLUTION INTRAMUSCULAR; INTRAVENOUS EVERY 8 HOURS PRN
Status: DISCONTINUED | OUTPATIENT
Start: 2021-06-20 | End: 2021-06-20

## 2021-06-20 RX ORDER — ONDANSETRON 4 MG/1
4 TABLET, ORALLY DISINTEGRATING ORAL EVERY 8 HOURS PRN
Status: DISCONTINUED | OUTPATIENT
Start: 2021-06-20 | End: 2021-06-21 | Stop reason: HOSPADM

## 2021-06-20 RX ORDER — CLOPIDOGREL BISULFATE 75 MG/1
75 TABLET ORAL DAILY
Status: DISCONTINUED | OUTPATIENT
Start: 2021-06-20 | End: 2021-06-21 | Stop reason: HOSPADM

## 2021-06-20 RX ORDER — AMLODIPINE BESYLATE 5 MG/1
5 TABLET ORAL 2 TIMES DAILY
Status: DISCONTINUED | OUTPATIENT
Start: 2021-06-20 | End: 2021-06-21 | Stop reason: HOSPADM

## 2021-06-20 RX ORDER — ONDANSETRON 2 MG/ML
4 INJECTION INTRAMUSCULAR; INTRAVENOUS EVERY 6 HOURS PRN
Status: DISCONTINUED | OUTPATIENT
Start: 2021-06-20 | End: 2021-06-21 | Stop reason: HOSPADM

## 2021-06-20 RX ORDER — ACETAMINOPHEN 650 MG/1
650 SUPPOSITORY RECTAL EVERY 6 HOURS PRN
Status: DISCONTINUED | OUTPATIENT
Start: 2021-06-20 | End: 2021-06-21 | Stop reason: HOSPADM

## 2021-06-20 RX ADMIN — Medication 12.5 MG: at 20:31

## 2021-06-20 RX ADMIN — CLOPIDOGREL BISULFATE 75 MG: 75 TABLET ORAL at 09:23

## 2021-06-20 RX ADMIN — OXYCODONE HYDROCHLORIDE AND ACETAMINOPHEN 1 TABLET: 7.5; 325 TABLET ORAL at 11:09

## 2021-06-20 RX ADMIN — ONDANSETRON 4 MG: 4 TABLET, ORALLY DISINTEGRATING ORAL at 14:08

## 2021-06-20 RX ADMIN — AMLODIPINE BESYLATE 5 MG: 5 TABLET ORAL at 20:29

## 2021-06-20 RX ADMIN — INSULIN LISPRO 4 UNITS: 100 INJECTION, SOLUTION INTRAVENOUS; SUBCUTANEOUS at 18:13

## 2021-06-20 RX ADMIN — HEPARIN SODIUM 3000 UNITS: 1000 INJECTION INTRAVENOUS; SUBCUTANEOUS at 01:53

## 2021-06-20 RX ADMIN — RANOLAZINE 1000 MG: 500 TABLET, FILM COATED, EXTENDED RELEASE ORAL at 01:50

## 2021-06-20 RX ADMIN — ASPIRIN 81 MG: 81 TABLET, CHEWABLE ORAL at 09:23

## 2021-06-20 RX ADMIN — ACETAMINOPHEN 650 MG: 325 TABLET ORAL at 12:42

## 2021-06-20 RX ADMIN — ATORVASTATIN CALCIUM 80 MG: 80 TABLET, FILM COATED ORAL at 20:29

## 2021-06-20 RX ADMIN — TORSEMIDE 10 MG: 20 TABLET ORAL at 09:23

## 2021-06-20 RX ADMIN — RANOLAZINE 1000 MG: 500 TABLET, FILM COATED, EXTENDED RELEASE ORAL at 09:23

## 2021-06-20 RX ADMIN — NITROGLYCERIN 10 MCG/MIN: 20 INJECTION INTRAVENOUS at 02:22

## 2021-06-20 RX ADMIN — QUETIAPINE FUMARATE 25 MG: 25 TABLET ORAL at 20:29

## 2021-06-20 RX ADMIN — OXYCODONE HYDROCHLORIDE AND ACETAMINOPHEN 1 TABLET: 7.5; 325 TABLET ORAL at 20:29

## 2021-06-20 RX ADMIN — DULOXETINE HYDROCHLORIDE 60 MG: 60 CAPSULE, DELAYED RELEASE ORAL at 09:25

## 2021-06-20 RX ADMIN — QUETIAPINE FUMARATE 25 MG: 25 TABLET ORAL at 01:50

## 2021-06-20 RX ADMIN — RANOLAZINE 1000 MG: 500 TABLET, FILM COATED, EXTENDED RELEASE ORAL at 20:29

## 2021-06-20 RX ADMIN — HEPARIN SODIUM 610 UNITS/HR: 10000 INJECTION, SOLUTION INTRAVENOUS at 01:54

## 2021-06-20 RX ADMIN — INSULIN LISPRO 1 UNITS: 100 INJECTION, SOLUTION INTRAVENOUS; SUBCUTANEOUS at 01:55

## 2021-06-20 RX ADMIN — BUDESONIDE AND FORMOTEROL FUMARATE DIHYDRATE 2 PUFF: 160; 4.5 AEROSOL RESPIRATORY (INHALATION) at 09:10

## 2021-06-20 RX ADMIN — ATORVASTATIN CALCIUM 80 MG: 80 TABLET, FILM COATED ORAL at 01:51

## 2021-06-20 RX ADMIN — OXYCODONE HYDROCHLORIDE AND ACETAMINOPHEN 1 TABLET: 7.5; 325 TABLET ORAL at 02:28

## 2021-06-20 RX ADMIN — AMLODIPINE BESYLATE 5 MG: 5 TABLET ORAL at 01:50

## 2021-06-20 ASSESSMENT — PAIN DESCRIPTION - FREQUENCY
FREQUENCY: CONTINUOUS

## 2021-06-20 ASSESSMENT — PAIN DESCRIPTION - ONSET
ONSET: ON-GOING

## 2021-06-20 ASSESSMENT — PAIN DESCRIPTION - LOCATION
LOCATION: CHEST
LOCATION: CHEST
LOCATION: HEAD
LOCATION: CHEST
LOCATION: CHEST

## 2021-06-20 ASSESSMENT — PAIN DESCRIPTION - ORIENTATION
ORIENTATION: LEFT
ORIENTATION: ANTERIOR
ORIENTATION: LEFT

## 2021-06-20 ASSESSMENT — PAIN DESCRIPTION - PROGRESSION
CLINICAL_PROGRESSION: NOT CHANGED
CLINICAL_PROGRESSION: GRADUALLY IMPROVING
CLINICAL_PROGRESSION: NOT CHANGED

## 2021-06-20 ASSESSMENT — PAIN - FUNCTIONAL ASSESSMENT
PAIN_FUNCTIONAL_ASSESSMENT: ACTIVITIES ARE NOT PREVENTED
PAIN_FUNCTIONAL_ASSESSMENT: ACTIVITIES ARE NOT PREVENTED

## 2021-06-20 ASSESSMENT — PAIN DESCRIPTION - PAIN TYPE
TYPE: ACUTE PAIN

## 2021-06-20 ASSESSMENT — PAIN DESCRIPTION - DESCRIPTORS
DESCRIPTORS: ACHING
DESCRIPTORS: HEADACHE
DESCRIPTORS: ACHING

## 2021-06-20 ASSESSMENT — PAIN SCALES - GENERAL
PAINLEVEL_OUTOF10: 3
PAINLEVEL_OUTOF10: 0
PAINLEVEL_OUTOF10: 8
PAINLEVEL_OUTOF10: 8
PAINLEVEL_OUTOF10: 6
PAINLEVEL_OUTOF10: 0
PAINLEVEL_OUTOF10: 7
PAINLEVEL_OUTOF10: 7

## 2021-06-20 ASSESSMENT — PAIN DESCRIPTION - DIRECTION
RADIATING_TOWARDS: BACK
RADIATING_TOWARDS: BACK

## 2021-06-20 NOTE — PROGRESS NOTES
Clinical Pharmacy Note  Heparin Dosing       Lab Results   Component Value Date    APTT 91.2 06/20/2021     Lab Results   Component Value Date    HGB 10.0 06/20/2021    HCT 29.7 06/20/2021     06/20/2021    INR 1.03 06/19/2021       Current Infusion Rate: 610 units/hr    Plan:  Bolus: none  Rate: 405 units/hr  Next aPTT: 1630  6-20-21    Pharmacy will continue to monitor and adjust based on aPTT results.     [address] [facility] [place], [facility], [place] 6/20/2021  9:26 AM

## 2021-06-20 NOTE — CONSULTS
Clinical Pharmacy Note  Heparin Dosing Consult    Ashley Alvarez is a 60 y.o. female ordered heparin per low dose nomogram by Patience Santana CNP. Lab Results   Component Value Date    APTT 38.3 06/19/2021     Lab Results   Component Value Date    HGB 10.5 06/19/2021    HCT 31.1 06/19/2021     06/19/2021    INR 1.03 06/19/2021       Ht Readings from Last 1 Encounters:   06/20/21 5' (1.524 m)        Wt Readings from Last 1 Encounters:   06/20/21 112 lb 10.5 oz (51.1 kg)     Dosing weight: 51.1 kg    Assessment/Plan:  Initial bolus: 3000 units  Initial infusion rate: 610 units/hr  Next aPTT: 0800 6/20/21    Pharmacy will continue to monitor adjust heparin based on aPTT results using nomogram below:     LOW DOSE HEPARIN PROTOCOL (ACS/STEMI/A FIB)     Initial Bolus: 60 units/kg Max Bolus: 4,000 units       Initial Rate: 12 units/kg/hr Max Initial Rate: 1,000 units/hr     aPTT < 36   Heparin 60 units/kg bolus Increase infusion by 4 units/kg/hr       (maximum 4,000 units)   aPTT 37-48   Heparin 30 units/kg bolus Increase infusion by 2 units/kg/hr       (maximum 2,000 units)   aPTT 49-76   No bolus   No change   aPTT 77-85   No bolus   Decrease infusion by 2 units/kg/hr   aPTT 86-94   Hold heparin for 1 hour Decrease infusion by 3 units/kg/hr   aPTT     Hold heparin for 1 hour Decrease infusion by 4 units/kg/hr   aPTT > 103   Hold heparin for 1 hour Decrease infusion by 6 units/kg/hr    Obtain aPTT 6 hours after initial bolus and 6 hours after any dose change until two consecutive therapeutic aPTTs are achieved - then daily.     Chinyere Fuchs, PharmD

## 2021-06-20 NOTE — PROGRESS NOTES
RUE 20G IV placement per myself> US guided. Site prepped with chloroprep. Good blood return and advanced without difficulty. Flushed easily. Pt denied any pain. No evidence of site swelling or firmness developing with flushing. tegaderm dressing applied.

## 2021-06-20 NOTE — ED NOTES
.[facility] Patient to be transported to Room 89280 via stretcher by ED tech. Patient transported on cardiac monitoring and will be transported when IV access is obtained. pain assessed as 8/10 and documented.       [facility]  06/19/21 7955

## 2021-06-20 NOTE — PROGRESS NOTES
Hospitalist Progress Note      PCP: [facility], MD    Date of Admission: 6/19/2021     Subjective: feels CP improved, SOB better    Medications:  Reviewed    Infusion Medications    sodium chloride      heparin (PORCINE) Infusion 405 Units/hr (06/20/21 1034)    dextrose      nitroGLYCERIN 10 mcg/min (06/20/21 0222)     Scheduled Medications    sodium chloride flush  5-40 mL Intravenous 2 times per day    aspirin  81 mg Oral Daily    atorvastatin  80 mg Oral Nightly    amLODIPine  5 mg Oral BID    budesonide-formoterol  2 puff Inhalation Daily    clopidogrel  75 mg Oral Daily    DULoxetine  60 mg Oral Daily    insulin lispro  0-12 Units Subcutaneous TID WC    insulin lispro  0-6 Units Subcutaneous Nightly    QUEtiapine  25 mg Oral Nightly    ranolazine  1,000 mg Oral BID    torsemide  10 mg Oral Daily     PRN Meds: sodium chloride flush, sodium chloride, ondansetron **OR** ondansetron, acetaminophen **OR** acetaminophen, polyethylene glycol, albuterol sulfate HFA, glucose, dextrose, glucagon (rDNA), dextrose, oxyCODONE-acetaminophen      Intake/Output Summary (Last 24 hours) at 6/20/2021 1814  Last data filed at 6/20/2021 1000  Gross per 24 hour   Intake 100 ml   Output --   Net 100 ml       Physical Exam Performed:    BP (!) 151/54   Pulse 56   Temp 97.8 °F (36.6 °C) (Oral)   Resp 14   Ht 5' (1.524 m)   Wt 112 lb 10.5 oz (51.1 kg)   SpO2 100%   BMI 22.00 kg/m²     General appearance: NAD  Lungs: Clear to auscultation, bilaterally without Rales/Wheezes/Rhonchi with good respiratory effort. Heart: Regular rate and rhythm with Normal S1/S2   Abdomen: Soft, non-tender or non-distended without rigidity or guarding and positive bowel sounds   Extremities: No clubbing, cyanosis, or edema bilaterally. Full range of motion without deformity and normal gait intact. Skin: Skin color, texture, turgor normal.  No rashes or lesions.   Neurologic: Alert and oriented X 3, neurovascularly intact hypertension:  Continue amlodipine  Holding metoprolol because on NTG drip will be monitoring blood pressure closely  Continue Demadex        A. fib: History of. Currently in sinus rhythm. We will continue Plavix and aspirin       History of heart failure: No clinical indication of acute heart failure, , chest x-ray negative for vascular congestion. Continue Demadex  ECHO:6/18/2018:  Summary   Left ventricular cavity size is normal.   Normal left ventricular wall thickness.   Ejection fraction is visually estimated to be 55-60%.   Normal right ventricular size and function.   Trivial valvular disease.     Chronic Pain: in pain management w/ Dr. Walker Clamp: OD risk score: 330, narc score: 401  Rx: oxycodone: 7.5mg/325mg #105 for 35 days on 6/12/2021  Tinazidine held because patient is on beta-blocker therapy. Tenacity and can potentiate beta-blocker effect and literature indicates is contraindicated. Will replace with Robaxin for muscle relaxant     DM: SSI, FSBS, hypoglycemia protocol  Hgb A1c in AM     CKD: BUN: 18, Cr: 1.5, GFR: 42     History of COPD: Continue steroid inhaler and albuterol inhaler per home regimen         DVT Prophylaxis: Heparin  Diet: ADULT DIET; Regular;  Low Sodium (2 gm)  Code Status: Full Code    PT/OT Eval Status: ordered    Dispo - cont care, await cardio Savanna Flores MD

## 2021-06-20 NOTE — PROGRESS NOTES
4 Eyes Skin Assessment     NAME:  Melody Issa  YOB: 1955  MEDICAL RECORD NUMBER:  [id number]    The patient is being assess for  Admission    I agree that 2 RN's have performed a thorough Head to Toe Skin Assessment on the patient. ALL assessment sites listed below have been assessed. Areas assessed by both nurses:    Head, Face, Ears, Shoulders, Back, Chest, Arms, Elbows, Hands, Sacrum. Buttock, Coccyx, Ischium and Legs. Feet and Heels        Does the Patient have a Wound?  No noted wound(s)       Rakan Prevention initiated:  No   Wound Care Orders initiated:  No    Pressure Injury (Stage 3,4, Unstageable, DTI, NWPT, and Complex wounds) if present place consult order under [de-identified] No    New and Established Ostomies if present place consult order under : No      Nurse 1 eSignature: Electronically signed by Horace Reese RN on 6/20/21 at 3:17 AM EDT    **SHARE this note so that the co-signing nurse is able to place an eSignature**    Nurse 2 eSignature: Electronically signed by Rekha Morelos RN on 6/20/21 at 3:30 AM EDT

## 2021-06-20 NOTE — H&P
nontender nondistended. CODE STATUS full  Lives at home    Past Medical History:        Diagnosis Date    Acid reflux     Anemia     Anxiety     Arthritis     Asthma     At[facility] fibrillation ([facility] [facility]     Blood transfusion reaction     CAD (coronary artery disease) 12/3/2012    Cerebral artery occlusion with cerebral infarction [facility])     right sided weakness & headache    [facility]est[facility] failure) [facility]     Chronic kidney disease     40% kidney functiom    COPD (chronic obstructive pulmonary disease) ([facility])     Depression     DM2 (diabetes mellitus, type 2) ([facility] [address] [facility]     Dysarthria     Fibromyalgia 6/7/2016    Headache(784.0) 2/19/2013    Hemisensory loss     Hyperlipidemia     Hypertension     IBS (irritable bowel syndrome)     Inferior vena cava occlusion (HCC)     Irritable bowel syndrome     Keratitis     MI, old     Neuropathy     Superior vena cava obstruction     Temporal arteritis ([facility] [address] 2/24/2014       Past Surgical History:        Procedure Laterality Date    ABLATION OF DYSRHYTHMIC FOCUS  11/2020    ARTERY BIOPSY Right 04/23/2014    RIGHT TEMPORAL ARTERY BIOPSY    CAROTID ARTERY SURGERY Left     clean up per pt    CATARACT REMOVAL Bilateral     CHOLECYSTECTOMY      COLONOSCOPY N/A 4/9/2021    COLONOSCOPY WITH BIOPSY performed by Claire Booth MD at [address]    HYSTERECTOMY      JOINT REPLACEMENT Right     KNEE ARTHROSCOPY Right     PORT SURGERY      x`s 4 & removal of 4    PTCA  10/2019    LAD and RCA inrtervention    TUNNELED VENOUS PORT PLACEMENT      left thigh. SMART PORT-----Removed    UPPER GASTROINTESTINAL ENDOSCOPY N/A 7/6/2020    EGD DIAGNOSTIC ONLY performed by Chanda Garcia MD at [address]         Medications Prior to Admission:    Prior to Admission medications    Medication Sig Start Date End Date Taking?  Authorizing Provider   oxyCODONE-acetaminophen (PERCOCET) 7.5-325 MG per tablet Take 1 tablet by mouth every 6 hours as needed for Pain (max 3 day) for up to 35 days. 6/11/21 7/16/21 Yes Michelet Ferro MD   DULoxetine (CYMBALTA) 60 MG extended release capsule Take 1 capsule by mouth daily 6/11/21  Yes Michelet Ferro MD   QUEtiapine (SEROQUEL) 25 MG tablet Take 1 tablet by mouth nightly 6/11/21  Yes Michelet Ferro MD   ondansetron (ZOFRAN) 4 MG tablet TAKE 1 TABLET BY MOUTH EVERY 8 HOURS AS NEEDED FOR NAUSEA OR VOMITING 6/2/21  Yes Meenu Duvall MD   tiZANidine (ZANAFLEX) 4 MG tablet Take 1/2-1 tablet po BID 5/14/21  Yes Michelet Ferro MD   alirocumab (PRALUENT) 75 MG/ML SOAJ injection pen Inject 1 mL into the skin every 14 days 3/29/21  Yes Loreatha Phalen, APRN - CNP   insulin aspart (NOVOLOG FLEXPEN) 100 UNIT/ML injection pen Inject 3 Units into the skin 3 times daily (before meals)   Yes Historical Provider, MD   torsemide (DEMADEX) 10 MG tablet Take 10 mg by mouth daily   Yes Historical Provider, MD   insulin glargine (BASAGLAR KWIKPEN) 100 UNIT/ML injection pen Inject 8 Units into the skin 2 times daily  3/5/21  Yes Gege Hastings MD   metoprolol succinate (TOPROL XL) 50 MG extended release tablet TAKE 0.5 TABLETS BY MOUTH 2 TIMES DAILY (WITH MEALS)  Patient taking differently: Take 25 mg by mouth daily  2/5/21  Yes CYRUS Crowell   aspirin 81 MG chewable tablet Take 1 tablet by mouth daily 12/24/20  Yes Lloyd Mitchell MD   dicyclomine (BENTYL) 20 MG tablet Take 20 mg by mouth 4 times daily (before meals and nightly)  12/9/20  Yes Historical Provider, MD   nitroGLYCERIN (NITROSTAT) 0.4 MG SL tablet Place 1 tablet under the tongue every 5 minutes as needed for Chest pain up to max of 3 total doses.  If no relief after 1 dose, call 911. 12/16/20  Yes Meenu Duvall MD   Nutritional Supplements (ENSURE) LIQD Take 1 Can by mouth 3 times daily as needed (nutrition) 12/16/20  Yes Meenu Duvall MD   atorvastatin (LIPITOR) 80 MG tablet Take 1 tablet by mouth nightly 10/1/20  Yes Reta Kasper MD   amLODIPine (NORVASC) 5 MG tablet Take 1 tablet by mouth 2 times daily 10/1/20  Yes Reta Kasper MD   blood glucose test strips (TRUE METRIX BLOOD GLUCOSE TEST) strip 1 each by Does not apply route 2 times daily 9/25/20  Yes Reta Kasper MD   clopidogrel (PLAVIX) 75 MG tablet TAKE 1 TABLET BY MOUTH DA JULIEN 9/17/20  Yes Reta Kasper MD   albuterol (PROVENTIL) (2.5 MG/3ML) 0.083% nebulizer solution Take 3 mLs by nebulization every 4 hours as needed for Wheezing 3/25/20  Yes Reta Kasper MD   budesonide-formoterol (SYMBICORT) 160-4.5 MCG/ACT AERO Inhale 2 puffs into the lungs daily 12/6/19  Yes Reta Kasper MD   albuterol sulfate HFA (VENTOLIN HFA) 108 (90 Base) MCG/ACT inhaler Inhale 2 puffs into the lungs every 4 hours as needed for Wheezing or Shortness of Breath 12/6/19  Yes Reta Kasper MD   ranolazine (RANEXA) 1000 MG extended release tablet Take 1 tablet by mouth 2 times daily 9/10/19  Yes Reta Kasper MD   rizatriptan (MAXALT) 10 MG tablet Take 1 tablet by mouth once as needed for Migraine May repeat in 2 hours if needed 6/11/21 6/11/21  Ziggy Carson MD   Erenumab-aooe 140 MG/ML SOAJ Inject 140 mLs into the skin every 30 days 6/11/21   Ziggy Carson MD   UNIFINE PENTIPS 31G X 8 MM MISC USE WITH insulin pens 4/21/21   Reta Kasper MD   omeprazole (PRILOSEC) 20 MG delayed release capsule TAKE 1 CAPSULE BY MOUTH DAILY 3/5/21   Reta Kasper MD       Allergies:  Patient has no known allergies. Social History:  The patient currently lives at home    TOBACCO:   reports that she quit smoking about 2 years ago. Her smoking use included cigarettes. She has a 17.50 pack-year smoking history. She has never used smokeless tobacco.  ETOH:   reports no history of alcohol use. Family History:  Reviewed in detail and negative for DM, Early CAD, Cancer, CVA.  Positive as follows:        Problem Relation Age of Onset    Diabetes Mother     Hypertension Mother     High Cholesterol Mother     Stroke Mother     Cancer Mother     No Known Problems Paternal Grandfather         lung issues        REVIEW OF SYSTEMS:   Positive for pain and as noted in the HPI. All other systems reviewed and negative. PHYSICAL EXAM:    BP (!) 149/56   Pulse (!) 47   Temp 98.2 °F (36.8 °C) (Oral)   Resp 16   Ht 5' (1.524 m)   Wt 112 lb 10.5 oz (51.1 kg)   SpO2 96%   BMI 22.00 kg/m²     General appearance: No apparent distress appears stated age and cooperative. HEENT Normal cephalic, atraumatic without obvious deformity. Pupils equal, round, and reactive to light. Extra ocular muscles intact. Conjunctivae/corneas clear. Neck: Supple, No jugular venous distention/bruits. Trachea midline without thyromegaly or adenopathy with full range of motion. Lungs: Clear to auscultation, bilaterally without Rales/Wheezes/Rhonchi with good respiratory effort. Heart: Regular rate and rhythm with Normal S1/S2 without murmurs, rubs or gallops, point of maximum impulse non-displaced  Abdomen: Soft, non-tender or non-distended without rigidity or guarding and positive bowel sounds all four quadrants. Extremities: No clubbing, cyanosis, or edema bilaterally. Full range of motion without deformity and normal gait intact. Skin: Skin color, texture, turgor normal.  No rashes or lesions. Neurologic: Alert and oriented X 3, neurovascularly intact with sensory/motor intact upper extremities/lower extremities, bilaterally. Cranial nerves: II-XII intact, grossly non-focal.  Mental status: Alert, oriented, thought content appropriate. Capillary Refill: Acceptable  < 3 seconds  Peripheral Pulses: +3 Easily felt, not easily obliterated with pressure      CXR:  I have reviewed the CXR with the following interpretation: NAD  EKG:  I have reviewed the EKG with the following interpretation: Sinus rhythm, PVC. No axis deviation.   NS ST.  Rate 77, MO interval 140, QRS 8 0, , QTc 475. Compared to April 1, 2021 NS ST noted otherwise no acute changes. CBC   Recent Labs     06/19/21  1831   WBC 5.8   HGB 10.5*   HCT 31.1*         RENAL  Recent Labs     06/19/21  1831   *   K 4.9      CO2 25   BUN 18   CREATININE 1.5*     LFT'S  No results for input(s): AST, ALT, ALB, BILIDIR, BILITOT, ALKPHOS in the last 72 hours. COAG  Recent Labs     06/19/21  1831   INR 1.03     CARDIAC ENZYMES  Recent Labs     06/19/21  1831 06/19/21 2055   TROPONINI <0.01 <0.01       U/A:    Lab Results   Component Value Date    COLORU YELLOW 10/27/2020    WBCUA 7 10/27/2020    RBCUA 229 10/27/2020    MUCUS 1+ 05/23/2013    BACTERIA RARE 08/29/2019    CLARITYU CLOUDY 10/27/2020    SPECGRAV 1.022 10/27/2020    LEUKOCYTESUR TRACE 10/27/2020    BLOODU LARGE 10/27/2020    GLUCOSEU 250 10/27/2020    GLUCOSEU NEGATIVE 05/14/2012    AMORPHOUS Rare 12/11/2014       ABG    Lab Results   Component Value Date    TCC6LSQ 16.6 07/28/2019    BEART -7.4 07/28/2019    Q1NJDBFE 98.3 07/28/2019    PHART 7.367 07/28/2019    THGBART 10.9 11/16/2011    WXF4FDN 29.6 07/28/2019    PO2ART 97.4 07/28/2019    EGT5QIR 17.5 07/28/2019           Active Hospital Problems    Diagnosis Date Noted    Chest pain [R07.9] 12/22/2020         PHYSICIANS CERTIFICATION:    I certify that Layla Andre is expected to be hospitalized for more than 2 midnights based on the following assessment and plan:      ASSESSMENT/PLAN:    Chest pain: DDx: [place], evolving MI, NSTEMI, ACS, exacerbation of fibromyalgia, reflux, uncontrolled hypertension  No indication of heart failure. Currently no indication of STEMI. Chest x-ray is negative for widened mediastinum. There is no hypoxia. No evidence of pneumonia  Cardiology consulted per ED provider: admission recommended   Troponin: x2: Negative, will continue to trend  EKG in a.m.   Continue Ranexa  ASA, Plavix, statin therapy  Heparin gtt and NTG gtt  Continuous telemetry    HEART SCORE: 6  History: +2 high suspicion,   EKG:  +1 nonspec changes,   Age: +1 44-72,   Risk factors: +2 >3 or known CAD,  (factors = HLD, HTN, DM, tobacco, obesity, FHx)  Troponin:  0 neg    [id number] Score for Atrial Fibrillation Stroke Risk   Risk   Factors  Component Value   C CHF Yes 1   H HTN Yes 1   A2 Age >= 76 No,  (63 y.o.) 0   D DM Yes 1   S2 Prior Stroke/TIA No 0   V Vascular Disease Yes 1   A Age 74-69 No,  (63 y.o.) 0   Sc Sex female 1    [id number]  Score  5   Score last updated 6/19/21 14:61 PM EDT    Click here for a link to the UpToDate guideline \"Atrial Fibrillation: Anticoagulation therapy to prevent embolization    Disclaimer: Risk Score calculation is dependent on accuracy of patient problem list and past encounter diagnosis.   C: 11/30/2020: GSH:  This is a right dominant coronary arterial system     Left Main coronary artery: luminal irregularities  Left anterior descending coronary artery: patent proximal and distal stents  Left circumflex coronary artery: patent mid stent, OM1: mid stent with mid stent fracture and focal 70% in-stent restenosis, FFR = 0.66, medial branch of OM1 is in-stent MCC and has 70-80% ostial stenosis (2 mm diameter vessel)  Right coronary artery: patent proximal and distal stents, RPDA: 90-95% ostial stenosis  Left ventriculogram: normal wall motion, LVEF = 75%  LVEDP: 5 mmHg  Aortic pressure: 90/50 mmHg     Culprit vessel: OM1  YAKELIN flow pre-intervention: 3  YAKELIN flow post-intervention: 3  Percent stenosis pre-intervention: 70  Percent stenosis post-intervention: 10     Culprit vessel: RPDA  YAKELIN flow pre-intervention: 3  YAKELIN flow post-intervention: 3  Percent stenosis pre-intervention: 95  Percent stenosis post-intervention: 0     Impression:   3 Vessel CAD  Successful POBA OM1 with 3 mm balloon  FFR OM1 = 0.66  Successful PCI RPDA w/ 2.5 x 12 mm [place] DEANGELO  Patent proximal and mid LAD stents  Patent mid LCX stent  Patent proximal and distal RCA stents  Atln0Xflo score: 5      Further review of medical record shows she had a stress test done in March which was negative for ischemia. It appears she was supposed to have an outpatient left heart cath which has not yet been done.       Uncontrolled hypertension:  Continue amlodipine  Holding metoprolol because on NTG drip will be monitoring blood pressure closely  Continue Demadex     A. fib: History of. Currently in sinus rhythm. We will continue Plavix and aspirin    History of heart failure: No clinical indication of acute heart failure, , chest x-ray negative for vascular congestion. Continue Demadex  CRMA:4/62/9026:  Summary   Left ventricular cavity size is normal.   Normal left ventricular wall thickness. Ejection fraction is visually estimated to be 55-60%. Normal right ventricular size and function. Trivial valvular disease. Chronic Pain: in pain management w/ Dr. Carmen Prajapati: OD risk score: 330, narc score: 401  Rx: oxycodone: 7.5mg/325mg #105 for 35 days on 6/12/2021  Tinazidine held because patient is on beta-blocker therapy. Tenacity and can potentiate beta-blocker effect and literature indicates is contraindicated. Will replace with Robaxin for muscle relaxant    DM: SSI, FSBS, hypoglycemia protocol  Hgb A1c in AM    CKD: BUN: 18, Cr: 1.5, GFR: 42    History of COPD: Continue steroid inhaler and albuterol inhaler per home regimen    DVT Prophylaxis: Heparin  Diet: ADULT DIET; Clear Liquid; No Caffeine  Code Status: Full Code  PT/OT Eval Status: Independent    Dispo - admit, pcu       Stanley Reid, APRN - CNP    Thank you Erick Garcia MD for the opportunity to be involved in this patient's care. If you have any questions or concerns please feel free to contact me at [phone].

## 2021-06-21 ENCOUNTER — TELEPHONE (OUTPATIENT)
Dept: PRIMARY CARE CLINIC | Age: 66
End: 2021-06-21

## 2021-06-21 VITALS
DIASTOLIC BLOOD PRESSURE: 79 MMHG | BODY MASS INDEX: 21.9 KG/M2 | TEMPERATURE: 97.8 F | HEIGHT: 60 IN | SYSTOLIC BLOOD PRESSURE: 147 MMHG | HEART RATE: 66 BPM | RESPIRATION RATE: 14 BRPM | OXYGEN SATURATION: 100 % | WEIGHT: 111.55 LBS

## 2021-06-21 LAB
APTT: 52.4 SEC (ref 24.2–36.2)
EKG ATRIAL RATE: 50 BPM
EKG DIAGNOSIS: NORMAL
EKG P AXIS: -24 DEGREES
EKG P-R INTERVAL: 106 MS
EKG Q-T INTERVAL: 492 MS
EKG QRS DURATION: 86 MS
EKG QTC CALCULATION (BAZETT): 448 MS
EKG R AXIS: 51 DEGREES
EKG T AXIS: 67 DEGREES
EKG VENTRICULAR RATE: 50 BPM
ESTIMATED AVERAGE GLUCOSE: 200.1 MG/DL
GLUCOSE BLD-MCNC: 127 MG/DL (ref 70–99)
GLUCOSE BLD-MCNC: 147 MG/DL (ref 70–99)
HBA1C MFR BLD: 8.6 %
HCT VFR BLD CALC: 31 % (ref 36–48)
HEMOGLOBIN: 10.4 G/DL (ref 12–16)
MCH RBC QN AUTO: 32.2 PG (ref 26–34)
MCHC RBC AUTO-ENTMCNC: 33.7 G/DL (ref 31–36)
MCV RBC AUTO: 95.6 FL (ref 80–100)
PDW BLD-RTO: 14.3 % (ref 12.4–15.4)
PERFORMED ON: ABNORMAL
PERFORMED ON: ABNORMAL
PLATELET # BLD: 203 K/UL (ref 135–450)
PMV BLD AUTO: 8.6 FL (ref 5–10.5)
RBC # BLD: 3.24 M/UL (ref 4–5.2)
WBC # BLD: 4.9 K/UL (ref 4–11)

## 2021-06-21 PROCEDURE — 36415 COLL VENOUS BLD VENIPUNCTURE: CPT

## 2021-06-21 PROCEDURE — 85027 COMPLETE CBC AUTOMATED: CPT

## 2021-06-21 PROCEDURE — 94760 N-INVAS EAR/PLS OXIMETRY 1: CPT

## 2021-06-21 PROCEDURE — 85730 THROMBOPLASTIN TIME PARTIAL: CPT

## 2021-06-21 PROCEDURE — 93010 ELECTROCARDIOGRAM REPORT: CPT | Performed by: INTERNAL MEDICINE

## 2021-06-21 PROCEDURE — 6360000002 HC RX W HCPCS: Performed by: INTERNAL MEDICINE

## 2021-06-21 PROCEDURE — 2580000003 HC RX 258: Performed by: INTERNAL MEDICINE

## 2021-06-21 PROCEDURE — 99223 1ST HOSP IP/OBS HIGH 75: CPT | Performed by: INTERNAL MEDICINE

## 2021-06-21 PROCEDURE — 6370000000 HC RX 637 (ALT 250 FOR IP): Performed by: NURSE PRACTITIONER

## 2021-06-21 PROCEDURE — 6360000002 HC RX W HCPCS: Performed by: NURSE PRACTITIONER

## 2021-06-21 RX ADMIN — INSULIN LISPRO 2 UNITS: 100 INJECTION, SOLUTION INTRAVENOUS; SUBCUTANEOUS at 08:42

## 2021-06-21 RX ADMIN — AMLODIPINE BESYLATE 5 MG: 5 TABLET ORAL at 08:41

## 2021-06-21 RX ADMIN — CLOPIDOGREL BISULFATE 75 MG: 75 TABLET ORAL at 08:40

## 2021-06-21 RX ADMIN — DULOXETINE HYDROCHLORIDE 60 MG: 60 CAPSULE, DELAYED RELEASE ORAL at 08:41

## 2021-06-21 RX ADMIN — ASPIRIN 81 MG: 81 TABLET, CHEWABLE ORAL at 08:40

## 2021-06-21 RX ADMIN — ONDANSETRON 4 MG: 2 INJECTION INTRAMUSCULAR; INTRAVENOUS at 08:24

## 2021-06-21 RX ADMIN — TORSEMIDE 10 MG: 20 TABLET ORAL at 08:41

## 2021-06-21 RX ADMIN — RANOLAZINE 1000 MG: 500 TABLET, FILM COATED, EXTENDED RELEASE ORAL at 08:40

## 2021-06-21 RX ADMIN — Medication 12.5 MG: at 05:09

## 2021-06-21 ASSESSMENT — PAIN DESCRIPTION - PAIN TYPE: TYPE: ACUTE PAIN

## 2021-06-21 ASSESSMENT — PAIN DESCRIPTION - LOCATION: LOCATION: HEAD

## 2021-06-21 ASSESSMENT — PAIN SCALES - GENERAL: PAINLEVEL_OUTOF10: 7

## 2021-06-21 ASSESSMENT — PAIN DESCRIPTION - DESCRIPTORS: DESCRIPTORS: HEADACHE

## 2021-06-21 NOTE — ACP (ADVANCE CARE PLANNING)
Advance Care Planning     Advance Care Planning Activator (Inpatient)  Conversation Note      Date of ACP Conversation: 6/21/2021     Conversation Conducted with: Patient with Decision Making Capacity    ACP Activator: CUATE Rowley, [facility], [place] Decision Maker:     Current Designated Health Care Decision Maker:   Charlette Cisneros (primary decision maker) [phone]     Volney Nageotte (primary decision maker) [phone]     [place]ton Pav[place]ominik (primary decision maker) [phone]    Today we documented Decision Maker(s) consistent with Legal Next of Kin hierarchy. Care Preferences    Ventilation: \"If you were in your present state of health and suddenly became very ill and were unable to breathe on your own, what would your preference be about the use of a ventilator (breathing machine) if it were available to you? \"      Would the patient desire the use of ventilator (breathing machine)?: yes    \"If your health worsens and it becomes clear that your chance of recovery is unlikely, what would your preference be about the use of a ventilator (breathing machine) if it were available to you? \"     Would the patient desire the use of ventilator (breathing machine)?: Yes      Resuscitation  \"CPR works best to restart the heart when there is a sudden event, like a heart attack, in someone who is otherwise healthy. Unfortunately, CPR does not typically restart the heart for people who have serious health conditions or who are very sick. \"    \"In the event your heart stopped as a result of an underlying serious health condition, would you want attempts to be made to restart your heart (answer \"yes\" for attempt to resuscitate) or would you prefer a natural death (answer \"no\" for do not attempt to resuscitate)? \" yes       [] Yes   [x] No   Educated Patient / Leon Call regarding differences between Advance Directives and portable DNR orders.     Length of ACP Conversation in minutes:  5    Conversation

## 2021-06-21 NOTE — PROGRESS NOTES
Clinical Pharmacy Note  Heparin Dosing       Lab Results   Component Value Date    APTT 84.3 06/20/2021     Lab Results   Component Value Date    HGB 10.0 06/20/2021    HCT 29.7 06/20/2021     06/20/2021    INR 1.03 06/19/2021       Current Infusion Rate: 405 units/hr    Plan:  Rate: decrease to 300 units/hr  Next aPTT: 0700 6/21/21    Pharmacy will continue to monitor and adjust based on aPTT results.   Christine Blackwood, PharmD

## 2021-06-21 NOTE — PROGRESS NOTES
Discharge order received, pt verbalized agreement to d/c home. Discharge instructions prepared and given to pt, pt VU. Medication information packet given r/t NEW and/or CHANGED prescriptions, pt VU. Pt belongings gathered, IV's and telemetry box removed without complication. Disposition is home, transported with daughter via private vehicle, taken to lobby via wheelchair.      Electronically signed by Collin Steve RN on 6/21/2021 at 3:49 PM

## 2021-06-21 NOTE — TELEPHONE ENCOUNTER
FYI Rep w/ [facility] stated patient was hospitalized in [facility] 06/19/21-6/21/21 for chest pains.

## 2021-06-21 NOTE — PROGRESS NOTES
Clinical Pharmacy Note  Heparin Dosing       Lab Results   Component Value Date    APTT 52.4 06/21/2021     Lab Results   Component Value Date    HGB 10.4 06/21/2021    HCT 31.0 06/21/2021     06/21/2021    INR 1.03 06/19/2021       Current Infusion Rate: 300 units/hr    Plan:  [place]:   Rate: 300 units/hr  Next aPTT: 1430 6/21/21    Pharmacy will continue to monitor and adjust based on aPTT results.   Vaibhav Morfin, [facility], [address] 6/21/2021 9:31 AM

## 2021-06-21 NOTE — CARE COORDINATION
CASE MANAGEMENT DISCHARGE SUMMARY:    DISCHARGE DATE: 6/21/2021    DISCHARGED TO: Home alone with resumption of [facility] services and family support.      [place] ON AGING:  - Shayy Casiano (notified of discharge, faxed AVS)             PHONE NUMBER: [phone]             FAX NUMBER: [phone]    TRANSPORTATION: Daughter             TIME: 3:30 PM       CUATE Solis, [facility], Social Work/Case Management   [phone]  Electronically signed by CUATE Solis, [facility] on 6/21/2021 at 3:09 PM

## 2021-06-21 NOTE — CONSULTS
[address]  Cardiology Consult    Yvonne Sen  1955    June 21, 2021      Reason for Referral: Chest pain  CC: Chest pain      Subjective:     History of Present Illness:    Yvonne Sen is a 60 y.o. patient with a PMH significant for coronary disease, atrial fibrillation, hypertension presented with complaints of chest pain. Chest pain substernal nonradiating constant no aggravating relieving factors. No nausea vomiting diaphoresis. Chest pain accompanied with shortness of breath. Has history of gastroesophageal reflux disorder with GI bleeding and GI care. Past Medical History:   has a past medical history of Acid reflux, Anemia, Anxiety, Arthritis, Asthma, Atrial fibrillation ([address] Blood transfusion reaction, CAD (coronary artery disease), Cerebral artery occlusion with cerebral infarction ([address] CHF (congestive heart failure) ([address] Chronic kidney disease, COPD (chronic obstructive pulmonary disease) ([address] Depression, DM2 (diabetes mellitus, type 2) ([address] Dysarthria, Fibromyalgia, Headache(784.0), Hemisensory loss, Hyperlipidemia, Hypertension, IBS (irritable bowel syndrome), Inferior vena cava occlusion ([address] Irritable bowel syndrome, Keratitis, MI, old, Neuropathy, Superior vena cava obstruction, and Temporal arteritis ([address] Surgical History:   has a past surgical history that includes Tunneled venous port placement; Cholecystectomy; ablation of dysrhythmic focus (11/2020); Cataract removal (Bilateral); joint replacement (Right); Hysterectomy; Artery Biopsy (Right, 04/23/2014); Coronary angioplasty with stent (2020); Knee arthroscopy (Right); vascular surgery; Percutaneous Transluminal Coronary Angio (10/2019); Upper gastrointestinal endoscopy (N/A, 7/6/2020);  [address] Surgery; Carotid artery surgery (Left); and Colonoscopy (N/A, 4/9/2021). Social History:   reports that she quit smoking about 2 years ago. Her smoking use included cigarettes.  She has a 17.50 pack-year smoking history. She has never used smokeless tobacco. She reports that she does not drink alcohol and does not use drugs. Family History:  family history includes Cancer in her mother; Diabetes in her mother; High Cholesterol in her mother; Hypertension in her mother; No Known Problems in her paternal grandfather; Stroke in her mother. Home Medications:  Were reviewed and are listed in nursing record and/or below  Prior to Admission medications    Medication Sig Start Date End Date Taking? Authorizing Provider   oxyCODONE-acetaminophen (PERCOCET) 7.5-325 MG per tablet Take 1 tablet by mouth every 6 hours as needed for Pain (max 3 day) for up to 35 days.  6/11/21 7/16/21 Yes Oralia Rodgers MD   DULoxetine (CYMBALTA) 60 MG extended release capsule Take 1 capsule by mouth daily 6/11/21  Yes Oralia Rodgers MD   rizatriptan (MAXALT) 10 MG tablet Take 1 tablet by mouth once as needed for Migraine May repeat in 2 hours if needed 6/11/21 6/20/21 Yes Oralia Rodgers MD   Erenumab-aooe 140 MG/ML SOAJ Inject 140 mLs into the skin every 30 days 6/11/21  Yes Oralia Rodgers MD   QUEtiapine (SEROQUEL) 25 MG tablet Take 1 tablet by mouth nightly 6/11/21  Yes Oralia Rodgers MD   ondansetron (ZOFRAN) 4 MG tablet TAKE 1 TABLET BY MOUTH EVERY 8 HOURS AS NEEDED FOR NAUSEA OR VOMITING 6/2/21  Yes Clementine Alberto MD   tiZANidine (ZANAFLEX) 4 MG tablet Take 1/2-1 tablet po BID 5/14/21  Yes Oralia Rodgers MD   alirocumab (PRALUENT) 75 MG/ML SOAJ injection pen Inject 1 mL into the skin every 14 days 3/29/21  Yes CYRUS Kovacs - CNP   insulin aspart (NOVOLOG FLEXPEN) 100 UNIT/ML injection pen Inject 3 Units into the skin 3 times daily (before meals)   Yes Historical Provider, MD   torsemide (DEMADEX) 10 MG tablet Take 10 mg by mouth daily   Yes Historical Provider, MD   omeprazole (PRILOSEC) 20 MG delayed release capsule TAKE 1 CAPSULE BY MOUTH DAILY 3/5/21  Yes Clementine Alberto MD   insulin glargine ([facility]) 100 UNIT/ML injection pen Inject 8 Units into the skin 2 times daily  3/5/21  Yes Kelsey Nichole MD   metoprolol succinate (TOPROL XL) 50 MG extended release tablet TAKE 0.5 TABLETS BY MOUTH 2 TIMES DAILY (WITH MEALS)  Patient taking differently: Take 25 mg by mouth daily  2/5/21  Yes CYRUS Crowell   aspirin 81 MG chewable tablet Take 1 tablet by mouth daily 12/24/20  Yes Migue Logan MD   dicyclomine (BENTYL) 20 MG tablet Take 20 mg by mouth 4 times daily (before meals and nightly)  12/9/20  Yes Historical Provider, MD   nitroGLYCERIN (NITROSTAT) 0.4 MG SL tablet Place 1 tablet under the tongue every 5 minutes as needed for Chest pain up to max of 3 total doses.  If no relief after 1 dose, call 911. 12/16/20  Yes Cierra Barber MD   Nutritional Supplements (ENSURE) LIQD Take 1 Can by mouth 3 times daily as needed (nutrition) 12/16/20  Yes Cierra Barber MD   atorvastatin (LIPITOR) 80 MG tablet Take 1 tablet by mouth nightly 10/1/20  Yes Cierra Barber MD   amLODIPine (NORVASC) 5 MG tablet Take 1 tablet by mouth 2 times daily 10/1/20  Yes Cierra Barber MD   blood glucose test strips (TRUE METRIX BLOOD GLUCOSE TEST) strip 1 each by Does not apply route 2 times daily 9/25/20  Yes Cierra Barber MD   clopidogrel (PLAVIX) 75 MG tablet TAKE 1 TABLET BY MOUTH DA JULIEN 9/17/20  Yes Cierra Barber MD   albuterol (PROVENTIL) (2.5 MG/3ML) 0.083% nebulizer solution Take 3 mLs by nebulization every 4 hours as needed for Wheezing 3/25/20  Yes Cierra Barber MD   budesonide-formoterol (SYMBICORT) 160-4.5 MCG/ACT AERO Inhale 2 puffs into the lungs daily 12/6/19  Yes Cierra Barber MD   albuterol sulfate HFA (VENTOLIN HFA) 108 (90 Base) MCG/ACT inhaler Inhale 2 puffs into the lungs every 4 hours as needed for Wheezing or Shortness of Breath 12/6/19  Yes Cierra Barber MD   ranolazine (RANEXA) 1000 MG extended release tablet Take 1 tablet by mouth 2 times daily 9/10/19  Yes Cierra Barber MD UNIFINE PENTIPS 31G X 8 MM MISC USE WITH insulin pens 4/21/21   Phuong Ying MD        CURRENT Medications:  sodium chloride flush 0.9 % injection 5-40 mL, 2 times per day  sodium chloride flush 0.9 % injection 5-40 mL, PRN  0.9 % sodium chloride infusion, PRN  ondansetron (ZOFRAN-ODT) disintegrating tablet 4 mg, Q8H PRN   Or  ondansetron (ZOFRAN) injection 4 mg, Q6H PRN  acetaminophen (TYLENOL) tablet 650 mg, Q6H PRN   Or  acetaminophen (TYLENOL) suppository 650 mg, Q6H PRN  polyethylene glycol (GLYCOLAX) packet 17 g, Daily PRN  aspirin chewable tablet 81 mg, Daily  atorvastatin (LIPITOR) tablet 80 mg, Nightly  heparin 25,000 unit in sodium chloride 0.45% 250 mL (premix) infusion, Continuous  albuterol sulfate  (90 Base) MCG/ACT inhaler 2 puff, Q4H PRN  amLODIPine (NORVASC) tablet 5 mg, BID  budesonide-formoterol (SYMBICORT) 160-4.5 MCG/ACT inhaler 2 puff, Daily  clopidogrel (PLAVIX) tablet 75 mg, Daily  DULoxetine (CYMBALTA) extended release capsule 60 mg, Daily  glucose (GLUTOSE) 40 % oral gel 15 g, PRN  dextrose 50 % IV solution, PRN  glucagon (rDNA) injection 1 mg, PRN  dextrose 5 % solution, PRN  insulin lispro (HUMALOG) injection vial 0-12 Units, TID WC  insulin lispro (HUMALOG) injection vial 0-6 Units, Nightly  oxyCODONE-acetaminophen (PERCOCET) 7.5-325 MG per tablet 1 tablet, Q6H PRN  QUEtiapine (SEROQUEL) tablet 25 mg, Nightly  ranolazine (RANEXA) extended release tablet 1,000 mg, BID  torsemide (DEMADEX) tablet 10 mg, Daily  promethazine (PHENERGAN) 12.5mg in sodium chloride 0.9% 50 mL IVPB 12.5 mg, Q8H PRN  nitroGLYCERIN 50 mg in dextrose 5% 250 mL infusion, Continuous        Allergies:  Patient has no known allergies. Review of Systems: SEE HPI   · Constitutional: no unanticipated weight loss. There's been no change in energy level, sleep pattern, or activity level. No fevers, chills. · Eyes: No visual changes or diplopia. No scleral icterus.   · ENT: No Headaches, hearing loss pedal pulses, symmetric  Carotid  Femoral   Abdomen and Gastrointestinal:   Soft, non-tender, bowel sounds active. Liver and Spleen  Masses   Musculoskeletal: No muscle wasting  Back  Gait   Extremities: Extremities normal, atraumatic. No cyanosis or edema. No cyanosis clubbing       Skin: Inspection and palpation performed, no rashes or lesions. Pysch: Normal mood and affect. Alert and oriented to time place person   Neurologic: Normal gross motor and sensory exam.       Labs     All labs have been reviewed    Lab Results   Component Value Date    WBC 4.9 06/21/2021    RBC 3.24 06/21/2021    HGB 10.4 06/21/2021    HCT 31.0 06/21/2021    MCV 95.6 06/21/2021    RDW 14.3 06/21/2021     06/21/2021     Lab Results   Component Value Date     06/19/2021    K 4.9 06/19/2021     06/19/2021    CO2 25 06/19/2021    BUN 18 06/19/2021    CREATININE 1.5 06/19/2021    GFRAA 42 06/19/2021    GFRAA 60 05/23/2013    AGRATIO 1.1 04/01/2021    LABGLOM 35 06/19/2021    GLUCOSE 248 06/19/2021    PROT 9.3 04/01/2021    PROT 8.1 02/15/2013    CALCIUM 9.7 06/19/2021    BILITOT 0.3 04/01/2021    ALKPHOS 150 04/01/2021    AST 45 04/01/2021    ALT 36 04/01/2021     No results found for: PTINR  Lab Results   Component Value Date    LABA1C 8.3 04/01/2021     Lab Results   Component Value Date    CKTOTAL 41 08/22/2019    CKMB 2.3 10/06/2013    CKMBINDEX 0.9 04/01/2010    TROPONINI <0.01 06/20/2021       Cardiac, Vascular and Imaging Data all Personally Reviewed in Detail by Myself      All imaging labs were reviewed. Assessment and Plan     -Precordial chest pain. Coronary disease. Chest pain atypical for acute coronary syndrome. No significant EKG changes or troponin elevation. Coronary disease continue aggressive risk factor modification    Patient can be discharged home follow-up with me in the office    Thank you for allowing us to participate in the care of Percy Rondon.   Please do not hesitate to contact me if you have any questions.     Carlos Mclean MD, MPH    [facility], [address] [place] [address]   [address]  Ph: [phone]  Fax: [phone]    6/21/2021 2:32 PM

## 2021-06-21 NOTE — PLAN OF CARE
Problem: Pain:  Goal: Pain level will decrease  Description: Pain level will decrease  6/20/2021 2307 by Domonique Bermudez RN  Outcome: Ongoing  Note: Will monitor patient's pain levels and treat with appropriate Prn pain medication  6/20/2021 1141 by Art Arroyo RN  Outcome: Ongoing     Problem: Safety:  Goal: Free from accidental physical injury  Description: Free from accidental physical injury  6/20/2021 2307 by Domonique Bermudez RN  Outcome: Ongoing  Note: Patient's bed is in a low position, call light in reach, and bed alarms on.  Will continue to monitor  6/20/2021 1141 by Art Arroyo RN  Outcome: Ongoing

## 2021-06-21 NOTE — CARE COORDINATION
INITIAL CASE MANAGEMENT ASSESSMENT     Reviewed chart, spoke with patient to assess possible discharge needs. Explained Case Management role/services. Living Situation: Patient lives alone in an apartment; 0 steps to enter, with an elevator up to her apartment. ADLs: [place] varies. Assistance as needed from home health aide and family. DME: None    PT/OT Recs: Not ordered; independent in room      Active Services: [address]  program - aide 5 days a week, meals on wheels, life alert, and transportation. Transportation: [employer]     Medications: Evelyn     PCP: Cierra Barber MD      HD/PD: n/a    PLAN/COMMENTS: Patient plans to return home alone at discharge with family support. 1) [facility] - call to resume services   2) Transportation through [facility] - verify patient's ability that day, stretcher vs wheelchair     SW/CM provided contact information for patient or family to call with any questions. SW/CM will follow and assist as needed. CUATE Moody, [facility] [employer]  [phone]  Electronically signed by CUATE Moody LSW on 6/21/2021 at 11:49 AM    Confirmed with [facility] that patient is active with [facility] services. Services: Personal care 5 hours a day for 5 days a week - [facility], Meals on Wheels - 7 meals a week with [employer]; History of Skilled [facility] with [facility];  - Radha Lackey. Electronically signed by CUATE Moody LSW on 6/21/2021 at 12:18 PM    Spoke with , Elfego Antonio ([phone])  Confirmed  has contacted [facility] to resume services.  Faxed AVS.   Electronically signed by CUATE Moody LSW on 6/21/2021 at 3:07 PM

## 2021-06-23 ENCOUNTER — CARE COORDINATION (OUTPATIENT)
Dept: CASE MANAGEMENT | Age: 66
End: 2021-06-23

## 2021-06-23 DIAGNOSIS — R07.2 PRECORDIAL PAIN: Primary | ICD-10-CM

## 2021-06-23 PROCEDURE — 1111F DSCHRG MED/CURRENT MED MERGE: CPT

## 2021-06-23 NOTE — CARE COORDINATION
[address] Transitions Initial Follow Up Call    Call within 2 business days of discharge: Yes    Patient: Luis Hilliard Patient : 1955   MRN: [id number]  Reason for Admission: Chest pain  Discharge Date: 21 RARS: Readmission Risk Score: 45      Last Discharge [facility]       Complaint Diagnosis Description Type Department Provider    21 Chest Pain Chest pain, unspecified type . .. ED to Hosp-Admission (Discharged) (ADMITTED) MICH MenjivarN Cameron Steve MD; William Martins ... Transitions of Care Initial Call    Was this an external facility discharge? No Discharge Facility: N/A    Challenges to be reviewed by the provider   Additional needs identified to be addressed with provider: No               Method of communication with provider : none        Was this a readmission? No  Patient stated reason for admission: chest pain    Care Transition Nurse (CTN) contacted the patient by telephone to perform post hospital discharge assessment. Verified name and  with patient as identifiers. Provided introduction to self, and explanation of the CTN role. CTN reviewed discharge instructions, medical action plan and red flags with patient who verbalized understanding. Patient given an opportunity to ask questions and does not have any further questions or concerns at this time. Were discharge instructions available to patient? Yes. Reviewed appropriate site of care based on symptoms and resources available to patient including: PCP. The patient agrees to contact the PCP office for questions related to their healthcare. Medication reconciliation was performed with patient, who verbalizes understanding of administration of home medications. Advised obtaining a 90-day supply of all daily and as-needed medications. CTN provided contact information.      Non-face-to-face services provided:  Obtained and reviewed discharge summary and/or continuity of care documents  Assessment and

## 2021-06-28 NOTE — DISCHARGE SUMMARY
Hospital Medicine Discharge Summary    Patient ID: Noah Garcia      Patient's PCP: Socorro Munoz MD    Admit Date: 6/19/2021     Discharge Date: 6/21/2021     Admitting Physician: Erica Garcia DO     Discharge Physician: Cande Gonzalez MD     Discharge Diagnoses: Active Hospital Problems    Diagnosis     Chest pain [R07.9]        The patient was seen and examined on day of discharge and this discharge summary is in conjunction with any daily progress note from day of discharge. Hospital Course:    Patient presented to the emergency department for reports of central chest pain associated with nausea and vomiting, irregular pounding heartbeat. The pain would radiate in from the chest into the back and down the left arm. States that she has noticed this on and off for the past 1 or 2 weeks. She took 3 nitroglycerin which did help with the pain as well as aspirin. Covid vaccination series completed. Chest pain: DDx: [place], evolving MI, NSTEMI, ACS, exacerbation of fibromyalgia, reflux, uncontrolled hypertension  No indication of heart failure.  Currently no indication of STEMI. Chest x-ray is negative for widened mediastinum.  There is no hypoxia.  No evidence of pneumonia  Cardiology consulted per ED provider: admission recommended   Troponin: neg  Continue Ranexa, ASA, Plavix, statin therapy  Heparin gtt and NTG gtt-->dced  Cardiology consulted - no further testing, suspect 2/2 GI etio  Has been having GI work-up outpt - f/u with Dr Jackie Vaz outpt        Uncontrolled hypertension:  Continued home meds         A. fib: History of.  Currently in sinus rhythm.  cont home meds        History of heart failure: No clinical indication of acute heart failure, , chest x-ray negative for vascular congestion.   Continue Demadex  AMRO:5/28/2531:  Summary   Left ventricular cavity size is normal.   Normal left ventricular wall thickness.   Ejection fraction is visually estimated to be 55-60%.   Normal right ventricular size and function.   Trivial valvular disease.     Chronic Pain: in pain management w/ Dr. Justine Arriaga: OD risk score: 330, narc score: 401  Rx: oxycodone: 7.5mg/325mg #105 for 35 days on 6/12/2021  Tinazidine held because patient is on beta-blocker therapy.  Tenacity and can potentiate beta-blocker effect and literature indicates is contraindicated. Will replace with Robaxin for muscle relaxant     DM: SSI, FSBS, hypoglycemia protocol  Hgb A1c in AM     CKD: BUN: 18, Cr: 1.5, GFR: 42     History of COPD: Continue steroid inhaler and albuterol inhaler per home regimen       Physical Exam Performed:     BP (!) 147/79   Pulse 66   Temp 97.8 °F (36.6 °C) (Oral)   Resp 14   Ht 5' (1.524 m)   Wt 111 lb 8.8 oz (50.6 kg)   SpO2 100%   BMI 21.79 kg/m²     General appearance: NAD  Lungs: Clear to auscultation, bilaterally without Rales/Wheezes/Rhonchi with good respiratory effort. Heart: Regular rate and rhythm with Normal S1/S2   Abdomen: Soft, non-tender or non-distended without rigidity or guarding and positive bowel sounds   Extremities: No clubbing, cyanosis, or edema bilaterally.  Full range of motion without deformity and normal gait intact. Skin: Skin color, texture, turgor normal.  No rashes or lesions. Neurologic: Alert and oriented X 3, neurovascularly intact with sensory/motor intact upper extremities/lower extremities, bilaterally.  Cranial nerves: II-XII intact, grossly non-focal.  Mental status: Alert, oriented, thought content appropriate. Capillary Refill: Acceptable  < 3 seconds  Peripheral Pulses: +3 Easily felt, not easily obliterated with pressure       Labs:  For convenience and continuity at follow-up the following most recent labs are provided:      CBC:    Lab Results   Component Value Date    WBC 4.9 06/21/2021    HGB 10.4 06/21/2021    HCT 31.0 06/21/2021     06/21/2021       Renal:    Lab Results   Component Value Date     06/19/2021 K 4.9 06/19/2021     06/19/2021    CO2 25 06/19/2021    BUN 18 06/19/2021    CREATININE 1.5 06/19/2021    CALCIUM 9.7 06/19/2021    PHOS 3.9 04/02/2021         Significant Diagnostic Studies    Radiology:   XR CHEST (2 VW)   Final Result   1. No acute abnormality. Consults:     IP CONSULT TO CARDIOLOGY    Disposition:  home     Condition at Discharge: Stable    Discharge Instructions/Follow-up:  PCP in 1 week    Code Status:  Prior     Activity: activity as tolerated    Diet: cardiac diet and diabetic diet      Discharge Medications:     Discharge Medication List as of 6/21/2021  3:17 PM           Details   oxyCODONE-acetaminophen (PERCOCET) 7.5-325 MG per tablet Take 1 tablet by mouth every 6 hours as needed for Pain (max 3 day) for up to 35 days. , Disp-105 tablet, R-0Normal      DULoxetine (CYMBALTA) 60 MG extended release capsule Take 1 capsule by mouth daily, Disp-30 capsule, R-1Normal      rizatriptan (MAXALT) 10 MG tablet Take 1 tablet by mouth once as needed for Migraine May repeat in 2 hours if needed, Disp-9 tablet, R-1Normal      Erenumab-aooe 140 MG/ML SOAJ Inject 140 mLs into the skin every 30 days, Disp-1 pen, R-0Normal      QUEtiapine (SEROQUEL) 25 MG tablet Take 1 tablet by mouth nightly, Disp-60 tablet, R-3Normal      ondansetron (ZOFRAN) 4 MG tablet TAKE 1 TABLET BY MOUTH EVERY 8 HOURS AS NEEDED FOR NAUSEA OR VOMITING, Disp-21 tablet, R-1Normal      tiZANidine (ZANAFLEX) 4 MG tablet Take 1/2-1 tablet po BID, Disp-60 tablet, R-0Normal      UNIFINE PENTIPS 31G X 8 MM MISC Disp-100 each, R-5, Normal      alirocumab (PRALUENT) 75 MG/ML SOAJ injection pen Inject 1 mL into the skin every 14 days, Disp-6 pen, R-3Normal      insulin aspart (NOVOLOG FLEXPEN) 100 UNIT/ML injection pen Inject 3 Units into the skin 3 times daily (before meals)Historical Med      torsemide (DEMADEX) 10 MG tablet Take 10 mg by mouth dailyHistorical Med      omeprazole (PRILOSEC) 20 MG delayed release capsule TAKE 1 CAPSULE BY MOUTH DAILY, Disp-30 capsule, R-1Normal      insulin glargine (BASAGLAR KWIKPEN) 100 UNIT/ML injection pen Inject 8 Units into the skin 2 times daily , Disp-5 pen, R-3Historical Med      metoprolol succinate (TOPROL XL) 50 MG extended release tablet TAKE 0.5 TABLETS BY MOUTH 2 TIMES DAILY (WITH MEALS), Disp-30 tablet, R-5Normal      aspirin 81 MG chewable tablet Take 1 tablet by mouth daily, Disp-30 tablet, R-3Normal      dicyclomine (BENTYL) 20 MG tablet Take 20 mg by mouth 4 times daily (before meals and nightly) Historical Med      nitroGLYCERIN (NITROSTAT) 0.4 MG SL tablet Place 1 tablet under the tongue every 5 minutes as needed for Chest pain up to max of 3 total doses.  If no relief after 1 dose, call 911., Disp-25 tablet, R-3Normal      Nutritional Supplements (ENSURE) LIQD Take 1 Can by mouth 3 times daily as needed (nutrition), Disp-90 Can, R-5Normal      atorvastatin (LIPITOR) 80 MG tablet Take 1 tablet by mouth nightly, Disp-90 tablet,R-5Normal      amLODIPine (NORVASC) 5 MG tablet Take 1 tablet by mouth 2 times daily, Disp-180 tablet,R-5Normal      blood glucose test strips (TRUE METRIX BLOOD GLUCOSE TEST) strip 1 each by Does not apply route 2 times daily, Disp-100 each,R-5Normal      clopidogrel (PLAVIX) 75 MG tablet TAKE 1 TABLET BY MOUTH ABIMBOLA VICTORIA, Disp-90 tablet,R-5Normal      albuterol (PROVENTIL) (2.5 MG/3ML) 0.083% nebulizer solution Take 3 mLs by nebulization every 4 hours as needed for Wheezing, Disp-120 each, R-5Normal      budesonide-formoterol (SYMBICORT) 160-4.5 MCG/ACT AERO Inhale 2 puffs into the lungs daily, Disp-2 Inhaler, R-5Normal      albuterol sulfate HFA (VENTOLIN HFA) 108 (90 Base) MCG/ACT inhaler Inhale 2 puffs into the lungs every 4 hours as needed for Wheezing or Shortness of Breath, Disp-3 Inhaler, R-5Normal      ranolazine (RANEXA) 1000 MG extended release tablet Take 1 tablet by mouth 2 times daily, Disp-60 tablet, R-8Normal             Time Spent on discharge is more than 30 minutes in the examination, evaluation, counseling and review of medications and discharge plan. Signed:    Jaiden Gamboa MD   6/28/2021      Thank you Leslie Jain MD for the opportunity to be involved in this patient's care. If you have any questions or concerns please feel free to contact me at [phone].

## 2021-06-28 NOTE — PROGRESS NOTES
Physician Progress Note      Ventura MCLEOD #:                  [id number]  :                       1955  ADMIT DATE:       2021 4:42 PM  [address] DATE:        2021 3:50 PM  RESPONDING  PROVIDER #:        Sumaya Hylton MD          QUERY TEXT:    Pt admitted with chest pain. Pt noted to have uncontrolled HTN and GERD. If   possible, please document in progress notes and discharge summary if you are   evaluating and/or treating any of the following: The medical record reflects the following:  Risk Factors: HTN, GERD  Clinical Indicators: on admission /77, chest pain - per H&P -   uncontrolled HTN Holding metoprolol because on NTG drip will be monitoring   blood pressure closely  Treatment: amlodipine,  cardiology consult, hep gtt, nitro gtt, EKG, Prilosec  Options provided:  -- Chest pain due to HTN  -- Chest pain due to GERD  -- Other - I will add my own diagnosis  -- Disagree - Not applicable / Not valid  -- Disagree - Clinically unable to determine / Unknown  -- Refer to Clinical Documentation Reviewer    PROVIDER RESPONSE TEXT:    This patient has chest pain due to GERD.     Query created by: Cecy Wolff on 2021 10:45 AM      Electronically signed by:  Sumaya Hylton MD 2021 1:05 PM

## 2021-06-30 ENCOUNTER — HOSPITAL ENCOUNTER (OUTPATIENT)
Dept: NUCLEAR MEDICINE | Age: 66
Discharge: HOME | End: 2021-06-30
Payer: COMMERCIAL

## 2021-06-30 DIAGNOSIS — R11.2 NAUSEA AND VOMITING, INTRACTABILITY OF VOMITING NOT SPECIFIED, UNSPECIFIED VOMITING TYPE: ICD-10-CM

## 2021-06-30 LAB
CORTISOL TOTAL: 11.3 UG/DL
FERRITIN: 267 NG/ML (ref 15–150)
FOLATE: 11.29 NG/ML (ref 4.78–24.2)
IGA: 349 MG/DL (ref 70–400)
IRON SATURATION: 23 % (ref 15–50)
IRON: 69 UG/DL (ref 37–145)
TISSUE TRANSGLUTAMINASE IGA: <0.5 U/ML (ref 0–14)
TOTAL IRON BINDING CAPACITY: 294 UG/DL (ref 260–445)
VITAMIN B-12: 923 PG/ML (ref 211–911)

## 2021-06-30 PROCEDURE — A9541 TC99M SULFUR COLLOID: HCPCS | Performed by: INTERNAL MEDICINE

## 2021-06-30 PROCEDURE — 78264 GASTRIC EMPTYING IMG STUDY: CPT

## 2021-06-30 PROCEDURE — 3430000000 HC RX DIAGNOSTIC RADIOPHARMACEUTICAL: Performed by: INTERNAL MEDICINE

## 2021-06-30 RX ADMIN — Medication 1 MILLICURIE: at 07:37

## 2021-07-01 ENCOUNTER — TELEPHONE (OUTPATIENT)
Dept: PRIMARY CARE CLINIC | Age: 66
End: 2021-07-01

## 2021-07-01 ENCOUNTER — CARE COORDINATION (OUTPATIENT)
Dept: CASE MANAGEMENT | Age: 66
End: 2021-07-01

## 2021-07-01 NOTE — TELEPHONE ENCOUNTER
Patient says she had stomach test on 6/30/21 and would like Dr. Shauna Garces to call her to discuss results with her. Please call and advise.

## 2021-07-01 NOTE — CARE COORDINATION
Kya 45 Transitions Follow Up Call    2021    Patient: Jovanna Ni  Patient : 1955   MRN: [id number]  Reason for Admission: Chest Pain  Discharge Date: 21 RARS: Readmission Risk Score: 39         Spoke with: no one    Care Transitions Subsequent and Final Call    Subsequent and Final Calls  Care Transitions Interventions  Other Interventions:         Unable to reach patient for CTN follow up phone call. Left message. Contact info provided. Requested return call to CTN.     Follow Up  Future Appointments   Date Time Provider Yoni De Jesus   2021  2:30 PM Lin Sy MD [address]   2021 10:15 AM Jailene Guillen MD [facility]   2021  9:15 AM Cherry Gonzalez MD [facility]       Mateo Tinoco RN

## 2021-07-08 ENCOUNTER — OFFICE VISIT (OUTPATIENT)
Dept: CARDIOLOGY CLINIC | Age: 66
End: 2021-07-08
Payer: COMMERCIAL

## 2021-07-08 VITALS
OXYGEN SATURATION: 99 % | BODY MASS INDEX: 22.3 KG/M2 | DIASTOLIC BLOOD PRESSURE: 70 MMHG | HEART RATE: 81 BPM | SYSTOLIC BLOOD PRESSURE: 138 MMHG | WEIGHT: 113.6 LBS | HEIGHT: 60 IN

## 2021-07-08 DIAGNOSIS — I48.0 PAF (PAROXYSMAL ATRIAL FIBRILLATION) (HCC): ICD-10-CM

## 2021-07-08 DIAGNOSIS — E78.5 DYSLIPIDEMIA: ICD-10-CM

## 2021-07-08 DIAGNOSIS — I50.32 CHRONIC DIASTOLIC HF (HEART FAILURE) (HCC): ICD-10-CM

## 2021-07-08 DIAGNOSIS — I10 ESSENTIAL HYPERTENSION: ICD-10-CM

## 2021-07-08 DIAGNOSIS — I25.118 CORONARY ARTERY DISEASE OF NATIVE ARTERY OF NATIVE HEART WITH STABLE ANGINA PECTORIS (HCC): Primary | ICD-10-CM

## 2021-07-08 PROCEDURE — 99213 OFFICE O/P EST LOW 20 MIN: CPT | Performed by: INTERNAL MEDICINE

## 2021-07-08 PROCEDURE — G8420 CALC BMI NORM PARAMETERS: HCPCS | Performed by: INTERNAL MEDICINE

## 2021-07-08 PROCEDURE — G8427 DOCREV CUR MEDS BY ELIG CLIN: HCPCS | Performed by: INTERNAL MEDICINE

## 2021-07-08 PROCEDURE — 1036F TOBACCO NON-USER: CPT | Performed by: INTERNAL MEDICINE

## 2021-07-08 PROCEDURE — 3017F COLORECTAL CA SCREEN DOC REV: CPT | Performed by: INTERNAL MEDICINE

## 2021-07-08 PROCEDURE — 1111F DSCHRG MED/CURRENT MED MERGE: CPT | Performed by: INTERNAL MEDICINE

## 2021-07-08 NOTE — PROGRESS NOTES
[address] - Cardiology      CC: \"I still have chest pain, all the time and I get short of breath. \"     History of present illness: Octavia Shearer is a 60 y.o. female with past medical history significant for anxiety, fibromyalgia, DM, chronic pain syndrome, and seen today for her CAD/stents, diastolic heart failure, hypertension, hyperlipidemia, AFIB, KELLER and former smoker. Today, she is here for follow up. She continues to have chronic angina. She states that she had a GI bleed. Patient deniesdyspnea at rest, KELLER, PND, orthopnea, palpitations, lightheadedness, weight changes, changes in LE edema, and syncope.      Past Medical History:   Diagnosis Date    Acid reflux     Anemia     Anxiety     Arthritis     Asthma     Atrial fibrillation (HCC)     Blood transfusion reaction     CAD (coronary artery disease) 12/3/2012    Cerebral artery occlusion with cerebral infarction [facility])     right sided weakness & headache    CH[facility]congestive heart failure) [facility]     Chronic kidney disease     40% kidney functiom    COPD (chronic obstructive pulmonary disease) ([facility])     Depression     DM2 [facility]diabetes mellitus, type 2) [facility] [address]     Dysarthria     Fibromyalgia 6/7/2016    Headache(784.0) 2/19/2013    Hemisensory loss     Hyperlipidemia     Hypertension     IBS (irritable bowel syndrome)     Inferior vena cava occlusion ([facility])     Irritable bowel syndrome     Keratitis     MI, old     Neuropathy     Superior vena cava obstruction     Temporal arteritis ([facility] [address] 2/24/2014     Past Surgical History:   Procedure Laterality Date    ABLATION OF DYSRHYTHMIC FOCUS  11/2020    ARTERY BIOPSY Right 04/23/2014    RIGHT TEMPORAL ARTERY BIOPSY    CAROTID ARTERY SURGERY Left     clean up per pt    CATARACT REMOVAL Bilateral     CHOLECYSTECTOMY      COLONOSCOPY N/A 4/9/2021    [facility]AC[facility]  2020    HYSTERECTOMY      JOINT REPLACEMENT Right     KNEE ARTHROSCOPY Right     PORT SURGERY      x`s 4 & removal of 4    PTCA  10/2019    LAD and RCA inrtervention    TUNNELED VENOUS PORT PLACEMENT      left thigh. SMART PORT-----Removed    UPPER GASTROINTESTINAL ENDOSCOPY N/A 7/6/2020    EGD DIAGNOSTIC ONLY performed by Annabelle Orr MD at [address] [facility] History     Tobacco Use    Smoking status: Former Smoker     Packs/day: 0.50     Years: 35.00     Pack years: 17.50     Types: Cigarettes     Quit date: 7/1/2018     Years since quitting: 3.0    Smokeless tobacco: Never Used    Tobacco comment: 5/13/15 has not smoked since hospitalization -    Substance Use Topics    Alcohol use: No     Alcohol/week: 0.0 standard drinks       Review of Systems:   Constitutional: No significant change in weight or weakness. Negative fatigue  HEENT: No change in vision or ringing in the ears. Respiratory: No PND, orthopnea or cough. + dyspnea  Cardiovascular: See HPI + chest pain with exertion   GI: No n/v, abdominal pain or changes in bowel habits. No melena, no hematochezia  : No dysuria or hematuria. Skin: No rash or new skin lesions. Musculoskeletal: No new muscle or joint pain. Neurological: No syncope or TIA-like symptoms. Psychiatric: No anxiety, insomnia or depression  + anxiety    Physical Exam:  There were no vitals taken for this visit. General:  Awake, alert, oriented in NAD  Skin:  Warm and dry. No unusual bruising or rash  Neck:  Supple. No JVD or carotid bruit appreciated  Chest:  Normal effort. Clear to auscultation, no wheezes/rhonchi/rales  Cardiovascular:  RRR, S1/S2, no murmur/gallop/rub  Abdomen:  Soft, nontender, +bowel sounds  Extremities:  No edema. Right femoral arteriotomy site soft, non tender, without hematoma or pulsatile mass.   Neurological: No focal deficits  Psychological: Normal mood and affect      Current Outpatient Medications   Medication Sig Dispense Refill    oxyCODONE-acetaminophen (PERCOCET) 7.5-325 MG per tablet Take 1 tablet by mouth every 6 hours as needed for Pain (max 3 day) for up to 35 days. 105 tablet 0    DULoxetine (CYMBALTA) 60 MG extended release capsule Take 1 capsule by mouth daily 30 capsule 1    rizatriptan (MAXALT) 10 MG tablet Take 1 tablet by mouth once as needed for Migraine May repeat in 2 hours if needed 9 tablet 1    Erenumab-aooe 140 MG/ML SOAJ Inject 140 mLs into the skin every 30 days 1 pen 0    QUEtiapine (SEROQUEL) 25 MG tablet Take 1 tablet by mouth nightly 60 tablet 3    ondansetron (ZOFRAN) 4 MG tablet TAKE 1 TABLET BY MOUTH EVERY 8 HOURS AS NEEDED FOR NAUSEA OR VOMITING 21 tablet 1    tiZANidine (ZANAFLEX) 4 MG tablet Take 1/2-1 tablet po BID 60 tablet 0    UNIFINE PENTIPS 31G X 8 MM MISC USE WITH insulin pens 100 each 5    alirocumab (PRALUENT) 75 MG/ML SOAJ injection pen Inject 1 mL into the skin every 14 days 6 pen 3    insulin aspart (NOVOLOG FLEXPEN) 100 UNIT/ML injection pen Inject 3 Units into the skin 3 times daily (before meals)      torsemide (DEMADEX) 10 MG tablet Take 10 mg by mouth daily      omeprazole (PRILOSEC) 20 MG delayed release capsule TAKE 1 CAPSULE BY MOUTH DAILY 30 capsule 1    insulin glargine (BASAGLAR KWIKPEN) 100 UNIT/ML injection pen Inject 8 Units into the skin 2 times daily  5 pen 3    metoprolol succinate (TOPROL XL) 50 MG extended release tablet TAKE 0.5 TABLETS BY MOUTH 2 TIMES DAILY (WITH MEALS) (Patient taking differently: Take 25 mg by mouth daily ) 30 tablet 5    aspirin 81 MG chewable tablet Take 1 tablet by mouth daily 30 tablet 3    dicyclomine (BENTYL) 20 MG tablet Take 20 mg by mouth 4 times daily (before meals and nightly)       nitroGLYCERIN (NITROSTAT) 0.4 MG SL tablet Place 1 tablet under the tongue every 5 minutes as needed for Chest pain up to max of 3 total doses.  If no relief after 1 dose, call 911. 25 tablet 3  Nutritional Supplements (ENSURE) LIQD Take 1 Can by mouth 3 times daily as needed (nutrition) 90 Can 5    atorvastatin (LIPITOR) 80 MG tablet Take 1 tablet by mouth nightly 90 tablet 5    amLODIPine (NORVASC) 5 MG tablet Take 1 tablet by mouth 2 times daily 180 tablet 5    blood glucose test strips (TRUE METRIX BLOOD GLUCOSE TEST) strip 1 each by Does not apply route 2 times daily 100 each 5    clopidogrel (PLAVIX) 75 MG tablet TAKE 1 TABLET BY MOUTH DA JULIEN 90 tablet 5    albuterol (PROVENTIL) (2.5 MG/3ML) 0.083% nebulizer solution Take 3 mLs by nebulization every 4 hours as needed for Wheezing 120 each 5    budesonide-formoterol (SYMBICORT) 160-4.5 MCG/ACT AERO Inhale 2 puffs into the lungs daily 2 Inhaler 5    albuterol sulfate HFA (VENTOLIN HFA) 108 (90 Base) MCG/ACT inhaler Inhale 2 puffs into the lungs every 4 hours as needed for Wheezing or Shortness of Breath 3 Inhaler 5    ranolazine (RANEXA) 1000 MG extended release tablet Take 1 tablet by mouth 2 times daily 60 tablet 8     No current facility-administered medications for this visit. Labs: all labs reviewed   Lab Results   Component Value Date    WBC 4.9 06/21/2021    HGB 10.4 (L) 06/21/2021    HCT 31.0 (L) 06/21/2021    MCV 95.6 06/21/2021     06/21/2021     Lab Results   Component Value Date     06/19/2021    K 4.9 06/19/2021     06/19/2021    CO2 25 06/19/2021    BUN 18 06/19/2021    CREATININE 1.5 06/19/2021    GLUCOSE 248 06/19/2021    CALCIUM 9.7 06/19/2021      Lab Results   Component Value Date    TRIG 131 08/29/2019    HDL 70 08/29/2019    HDL 58 05/14/2012    LDLCALC 89 08/29/2019    LABVLDL 26 08/29/2019       EKG: 10/5/18 SR 60 bpm            1/10/18 SR    Diagnostics: all imaging reviewed     [facility] 6/2/2016:  1. The left main coronary artery comes from the left coronary cusp. It is a short left main with YAKELIN-3 flow.   No significant stenosis and gave rise to left anterior descending artery and left has a 70% stenosis. 3.  Left circumflex has patent stents. No significant stenosis. 4.  Right coronary artery has patent stents. No significant stenosis. 5.  LV ejection fraction 60%.  SUMMARY:  Patent coronary artery stents. Distal left anterior  descending artery 70% stenosis.     Stress test 3/11/2021  Pharmacological Stress/MPI Results:        1. Technically a satisfactory study.    2. No evidence of Ischemia by Myocardial Perfusion Imaging.    3. Gated Study shows normal LV size and Systolic function; EF is 70 %.    Assessment&Plan:    CAD. Chronic angina with multiple PCI  At this point  medical therapy   --6/2/16 Status post DEANGELO to RCA  --7/12/18 Proximal RCA DEANGELO, L Circumflex DEANGELO  -11/15/18 CTA cardiac limited study  --12/20/18 patent coronary artery stents. Distal LAD artery 70%  Chronic angina. Continue Asa, Plavix, B-blocker, statin and Praluent. Chronic diastolic heart failure:  NYHA class II. Appears compensated on exam. Continue B-blocker and diuretic. Atrial fib:   s/p ablation x2 per Dr. Rudi Case. Currently not on anticoagulation therapy due to GI bleed. Continues to follow with GI (Dr Destiny Aparicio). Will place 30 day event monitor to assess afib burden. COPD  Stable. Hypertension. Controlled. Goal BP <140/90. Continue current medical management. Hyperlipidemia. Continue high intensity statin and Praluent. Former smoker:   quit  5 years ago. Follow up in 6 months. Thanks for allowing me to participate in the care of this patient.     Dr. Dorothy Thakur    This note was scribed in the presence of Dr Sania Plummer, by Margo Agarwal RN  Physician Attestation:  The scribes documentation has been prepared under my direction and personally reviewed by me in its entirety. I confirm that the note above accurately reflects all work, treatment, procedures, and medical decision making performed by me.

## 2021-07-08 NOTE — PATIENT INSTRUCTIONS
Patient Education        Heart-Healthy Diet: Care Instructions  Your Care Instructions     A heart-healthy diet has lots of vegetables, fruits, nuts, beans, and whole grains, and is low in salt. It limits foods that are high in saturated fat, such as meats, cheeses, and fried foods. It may be hard to change your diet, but even small changes can lower your risk of heart attack and heart disease. Follow-up care is a key part of your treatment and safety. Be sure to make and go to all appointments, and call your doctor if you are having problems. It's also a good idea to know your test results and keep a list of the medicines you take. How can you care for yourself at home? Watch your portions  · Use food labels to learn what the recommended servings are for the foods you eat. · Eat only the number of calories you need to stay at a healthy weight. If you need to lose weight, eat fewer calories than your body burns (through exercise and other physical activity). Eat more fruits and vegetables  · Eat a variety of fruit and vegetables every day. Dark green, deep orange, red, or yellow fruits and vegetables are especially good for you. Examples include spinach, carrots, peaches, and berries. · Keep carrots, celery, and other veggies handy for snacks. Buy fruit that is in season and store it where you can see it so that you will be tempted to eat it. · Cook dishes that have a lot of veggies in them, such as stir-fries and soups. Limit saturated fat  · Read food labels, and try to avoid saturated fats. They increase your risk of heart disease. · Use olive or canola oil when you cook. · Bake, broil, grill, or steam foods instead of frying them. · Choose lean meats instead of high-fat meats such as hot dogs and sausages. Cut off all visible fat when you prepare meat. · Eat fish, skinless poultry, and meat alternatives such as soy products instead of high-fat meats.  Soy products, such as tofu, may be especially good for your heart. · Choose low-fat or fat-free milk and dairy products. Eat foods high in fiber  · Eat a variety of grain products every day. Include whole-grain foods that have lots of fiber and nutrients. Examples of whole-grain foods include oats, whole wheat bread, and brown rice. · Buy whole-grain breads and cereals, instead of white bread or pastries. Limit salt and sodium  · Limit how much salt and sodium you eat to help lower your blood pressure. · Taste food before you salt it. Add only a little salt when you think you need it. With time, your taste buds will adjust to less salt. · Eat fewer snack items, fast foods, and other high-salt, processed foods. Check food labels for the amount of sodium in packaged foods. · Choose low-sodium versions of canned goods (such as soups, vegetables, and beans). Limit sugar  · Limit drinks and foods with added sugar. These include candy, desserts, and soda pop. Limit alcohol  · Limit alcohol to no more than 2 drinks a day for men and 1 drink a day for women. Too much alcohol can cause health problems. When should you call for help? Watch closely for changes in your health, and be sure to contact your doctor if:    · You would like help planning heart-healthy meals. Where can you learn more? Go to https://wripl.[employer]. org and sign in to your [employer] account. Enter V137 in the [facility] box to learn more about \"Heart-Healthy Diet: Care Instructions. \"     If you do not have an account, please click on the \"Sign Up Now\" link. Current as of: December 17, 2020               Content Version: 12.9  © 5726-0816 [employer], [employer]. Care instructions adapted under license by [facility] ([facility]). If you have questions about a medical condition or this instruction, always ask your healthcare professional. [address] any warranty or liability for your use of this information.

## 2021-07-10 PROCEDURE — 93228 REMOTE 30 DAY ECG REV/REPORT: CPT | Performed by: INTERNAL MEDICINE

## 2021-07-12 ENCOUNTER — TELEPHONE (OUTPATIENT)
Dept: CARDIOLOGY CLINIC | Age: 66
End: 2021-07-12

## 2021-07-12 NOTE — LETTER
Kenzie Johnson  Phone: [phone]  Fax: [phone]    Ever Garcia MD        July 13, 2021     Patient: Melvi Mead   YOB: 1955           To Whom it May Concern:    Melvi Mead may proceed with upper endoscopy, colonoscopy, endoscopic ultrasound and / or ERCP and may hold aspirin and Plavix 5 days prior then resuming when safely possible. If you have any questions or concerns, please don't hesitate to call.     Sincerely,         Ever Garcia MD/ DEVYN Cordero

## 2021-07-12 NOTE — TELEPHONE ENCOUNTER
CARDIAC CLEARANCE     Procedure: upper endoscopy. Colonoscopy, endoscopic ultrasound and/or an ERCP  : Dr Maxine Norman  Date: 10/15/21    Medications needing held: Plavix, ASA held the day of procedure     How long?: 5 days prior    Phone Number and Contact Name for Physicians office: [phone]  Fax number to send information: [phone]    Clinical staff:    Cardiologist: Nighat Stephens  Last Appointment: 7/8/21  Next Appointment: x  Cardiac History: CAD, CHF, a-fib, COPD, HTN.  HLD

## 2021-07-12 NOTE — TELEPHONE ENCOUNTER
Dr. Cristy Ceballos, please advise is patient may hold ASA day of and Plavix for 5 days prior to upper endoscopy, colonoscopy, endoscopic ultrasound and / or ERCP.

## 2021-07-16 ENCOUNTER — PATIENT MESSAGE (OUTPATIENT)
Dept: PRIMARY CARE CLINIC | Age: 66
End: 2021-07-16

## 2021-07-16 NOTE — TELEPHONE ENCOUNTER
Medication:   Requested Prescriptions     Pending Prescriptions Disp Refills    ULTICARE MINI PEN NEEDLES 31G X 6 MM MISC [Pharmacy Med Name: Starla Old PEN NDL 6MM 31G 31G X 6 MM Miscellaneous] 100 each 0     Sig: USE WITH INSULINS FOUR TIMES A DAY        Last Filled:      Patient Phone Number: [phone] (home)     Last appt: 3/1/2021   Next appt: 7/22/2021    Last OARRS:   RX Monitoring 4/9/2019   Attestation The Prescription Monitoring Report for this patient was reviewed today.

## 2021-07-19 RX ORDER — PEN NEEDLE, DIABETIC 29 G X1/2"
NEEDLE, DISPOSABLE MISCELLANEOUS
Qty: 100 EACH | Refills: 0 | Status: SHIPPED | OUTPATIENT
Start: 2021-07-19 | End: 2022-03-14

## 2021-07-20 RX ORDER — ONDANSETRON 4 MG/1
4 TABLET, FILM COATED ORAL EVERY 8 HOURS PRN
Qty: 21 TABLET | Refills: 1 | Status: ON HOLD | OUTPATIENT
Start: 2021-07-20 | End: 2022-01-10 | Stop reason: ALTCHOICE

## 2021-07-20 RX ORDER — INSULIN GLARGINE 100 [IU]/ML
8 INJECTION, SOLUTION SUBCUTANEOUS 2 TIMES DAILY
Qty: 5 PEN | Refills: 3 | Status: SHIPPED | OUTPATIENT
Start: 2021-07-20 | End: 2022-01-27 | Stop reason: SDUPTHER

## 2021-07-20 RX ORDER — INSULIN ASPART 100 [IU]/ML
3 INJECTION, SOLUTION INTRAVENOUS; SUBCUTANEOUS
Qty: 5 PEN | Refills: 2 | Status: ON HOLD | OUTPATIENT
Start: 2021-07-20 | End: 2022-01-13 | Stop reason: HOSPADM

## 2021-07-21 ENCOUNTER — OFFICE VISIT (OUTPATIENT)
Dept: PAIN MANAGEMENT | Age: 66
End: 2021-07-21
Payer: COMMERCIAL

## 2021-07-21 VITALS
SYSTOLIC BLOOD PRESSURE: 155 MMHG | OXYGEN SATURATION: 100 % | DIASTOLIC BLOOD PRESSURE: 70 MMHG | TEMPERATURE: 98.1 F | HEART RATE: 67 BPM | BODY MASS INDEX: 23.01 KG/M2 | WEIGHT: 117.8 LBS

## 2021-07-21 DIAGNOSIS — M75.81 TENDINITIS OF RIGHT ROTATOR CUFF: ICD-10-CM

## 2021-07-21 DIAGNOSIS — G89.4 CHRONIC PAIN SYNDROME: ICD-10-CM

## 2021-07-21 DIAGNOSIS — M79.7 FIBROMYALGIA: ICD-10-CM

## 2021-07-21 DIAGNOSIS — E11.40 CHRONIC PAINFUL DIABETIC NEUROPATHY (HCC): ICD-10-CM

## 2021-07-21 DIAGNOSIS — M51.36 DDD (DEGENERATIVE DISC DISEASE), LUMBAR: ICD-10-CM

## 2021-07-21 DIAGNOSIS — M50.30 DDD (DEGENERATIVE DISC DISEASE), CERVICAL: ICD-10-CM

## 2021-07-21 DIAGNOSIS — G43.111 INTRACTABLE MIGRAINE WITH AURA WITH STATUS MIGRAINOSUS: ICD-10-CM

## 2021-07-21 PROCEDURE — 1111F DSCHRG MED/CURRENT MED MERGE: CPT | Performed by: INTERNAL MEDICINE

## 2021-07-21 PROCEDURE — 3052F HG A1C>EQUAL 8.0%<EQUAL 9.0%: CPT | Performed by: INTERNAL MEDICINE

## 2021-07-21 PROCEDURE — 2022F DILAT RTA XM EVC RTNOPTHY: CPT | Performed by: INTERNAL MEDICINE

## 2021-07-21 PROCEDURE — 99213 OFFICE O/P EST LOW 20 MIN: CPT | Performed by: INTERNAL MEDICINE

## 2021-07-21 PROCEDURE — 3017F COLORECTAL CA SCREEN DOC REV: CPT | Performed by: INTERNAL MEDICINE

## 2021-07-21 PROCEDURE — G8427 DOCREV CUR MEDS BY ELIG CLIN: HCPCS | Performed by: INTERNAL MEDICINE

## 2021-07-21 RX ORDER — QUETIAPINE FUMARATE 25 MG/1
25 TABLET, FILM COATED ORAL NIGHTLY
Qty: 60 TABLET | Refills: 3 | Status: SHIPPED | OUTPATIENT
Start: 2021-07-21 | End: 2021-08-20 | Stop reason: SDUPTHER

## 2021-07-21 RX ORDER — TIZANIDINE 4 MG/1
TABLET ORAL
Qty: 60 TABLET | Refills: 0 | Status: SHIPPED | OUTPATIENT
Start: 2021-07-21 | End: 2021-08-20 | Stop reason: SDUPTHER

## 2021-07-21 RX ORDER — UBROGEPANT 50 MG/1
TABLET ORAL
Qty: 10 TABLET | Refills: 0 | Status: SHIPPED | OUTPATIENT
Start: 2021-07-21 | End: 2021-08-20 | Stop reason: SDUPTHER

## 2021-07-21 RX ORDER — OXYCODONE AND ACETAMINOPHEN 7.5; 325 MG/1; MG/1
1 TABLET ORAL EVERY 6 HOURS PRN
Qty: 84 TABLET | Refills: 0 | Status: SHIPPED | OUTPATIENT
Start: 2021-07-21 | End: 2021-08-20 | Stop reason: SDUPTHER

## 2021-07-21 RX ORDER — DULOXETIN HYDROCHLORIDE 60 MG/1
60 CAPSULE, DELAYED RELEASE ORAL DAILY
Qty: 30 CAPSULE | Refills: 1 | Status: SHIPPED | OUTPATIENT
Start: 2021-07-21 | End: 2021-08-20 | Stop reason: SDUPTHER

## 2021-07-21 NOTE — PROGRESS NOTES
Wade Maher  1955  [id number]      HISTORY OF PRESENT ILLNESS:  Ms. Amor Reynolds is a 60 y.o. female returns for a follow up visit for pain management  She has a diagnosis of   1. Chronic pain syndrome    2. Chronic migraine    3. Chronic painful diabetic neuropathy ([facility]    4. DDD (degenerative disc disease), cervical    5. Intractable migraine with aura with status migrainosus    6. Moderate episode of recurrent major depressive disorder ([facility]    7. Tendinitis of right rotator cuff    8. DDD (degenerative disc disease), lumbar    9. Fibromyalgia    10. Primary insomnia    11. Rotator cuff tendonitis, right    . She complains of pain in the Neck, low back, with reaition to right shoulder, hand and leg  She rates the pain 9/10 and describes it as sharp, aching, burning, pressure, pins and needles. Current treatment regimen has helped relieve about 10% of the pain. She denies any side effects from the current pain regimen. Patient reports that since the last follow up visit the physical functioning is unchanged, family/social relationships are worse, mood is worse sleep patterns are worse, and that the overall functioning is unchanged. Patient denies misusing/abusing her narcotic pain medications or using any illegal drugs. There are No indicators for possible drug abuse, addiction or diversion problems. Patient sates she is having some increase headaches. She complains of increase pain in mid back on the right side and goes into the neck and back, muscles get tight. She mentions she does not want to take maxalt because of her heart. She mentions she has been using Zanaflex as needed. She says she is using Percocet 3 per day. He missed her appointment. ALLERGIES: Patients list of allergies were reviewed     MEDICATIONS: Ms. Amor Reynolds list of medications were reviewed. Her current medications are   Outpatient Medications Prior to Visit   Medication Sig Dispense Refill    insulin aspart (Chadd Taylor FLEXPEN) 100 UNIT/ML injection pen Inject 3 Units into the skin 3 times daily (before meals) 5 pen 2    insulin glargine (BASAGLAR KWIKPEN) 100 UNIT/ML injection pen Inject 8 Units into the skin 2 times daily 5 pen 3    ondansetron (ZOFRAN) 4 MG tablet Take 1 tablet by mouth every 8 hours as needed for Nausea or Vomiting 21 tablet 1    ULTICARE MINI PEN NEEDLES 31G X 6 MM MISC USE WITH INSULINS FOUR TIMES A  each 0    DULoxetine (CYMBALTA) 60 MG extended release capsule Take 1 capsule by mouth daily 30 capsule 1    Erenumab-aooe 140 MG/ML SOAJ Inject 140 mLs into the skin every 30 days 1 pen 0    QUEtiapine (SEROQUEL) 25 MG tablet Take 1 tablet by mouth nightly 60 tablet 3    tiZANidine (ZANAFLEX) 4 MG tablet Take 1/2-1 tablet po BID 60 tablet 0    UNIFINE PENTIPS 31G X 8 MM MISC USE WITH insulin pens 100 each 5    alirocumab (PRALUENT) 75 MG/ML SOAJ injection pen Inject 1 mL into the skin every 14 days 6 pen 3    torsemide (DEMADEX) 10 MG tablet Take 10 mg by mouth daily      omeprazole (PRILOSEC) 20 MG delayed release capsule TAKE 1 CAPSULE BY MOUTH DAILY 30 capsule 1    metoprolol succinate (TOPROL XL) 50 MG extended release tablet TAKE 0.5 TABLETS BY MOUTH 2 TIMES DAILY (WITH MEALS) (Patient taking differently: Take 25 mg by mouth daily ) 30 tablet 5    aspirin 81 MG chewable tablet Take 1 tablet by mouth daily 30 tablet 3    dicyclomine (BENTYL) 20 MG tablet Take 20 mg by mouth 4 times daily (before meals and nightly)       nitroGLYCERIN (NITROSTAT) 0.4 MG SL tablet Place 1 tablet under the tongue every 5 minutes as needed for Chest pain up to max of 3 total doses.  If no relief after 1 dose, call 911. 25 tablet 3    Nutritional Supplements (ENSURE) LIQD Take 1 Can by mouth 3 times daily as needed (nutrition) 90 Can 5    atorvastatin (LIPITOR) 80 MG tablet Take 1 tablet by mouth nightly 90 tablet 5    amLODIPine (NORVASC) 5 MG tablet Take 1 tablet by mouth 2 times daily 180 tablet 5    Intractable migraine with aura with status migrainosus        PLAN:  Informed verbal consent was obtained  -Continue with current opioid regimen Percocet 3 per day   -Discontinue Maxalt, start Ubrelvy. Patient has tried Imitrex also   -she was advised  to avoid using too many OTC analgesics to control the headaches, avoid chocolates, increased caffeine, cheeses and MSG nitrite containing foods, cigarette smoking. To avoid bright lights, strong smells and skipping meals.   -Discussed use, benefit, and side effects of prescribed medications. Barriers to medication compliance addressed. All patient questions answered. Pt voiced understanding.      Current Outpatient Medications   Medication Sig Dispense Refill    insulin aspart (NOVOLOG FLEXPEN) 100 UNIT/ML injection pen Inject 3 Units into the skin 3 times daily (before meals) 5 pen 2    insulin glargine (BASAGLAR KWIKPEN) 100 UNIT/ML injection pen Inject 8 Units into the skin 2 times daily 5 pen 3    ondansetron (ZOFRAN) 4 MG tablet Take 1 tablet by mouth every 8 hours as needed for Nausea or Vomiting 21 tablet 1    ULTICARE MINI PEN NEEDLES 31G X 6 MM MISC USE WITH INSULINS FOUR TIMES A  each 0    DULoxetine (CYMBALTA) 60 MG extended release capsule Take 1 capsule by mouth daily 30 capsule 1    Erenumab-aooe 140 MG/ML SOAJ Inject 140 mLs into the skin every 30 days 1 pen 0    QUEtiapine (SEROQUEL) 25 MG tablet Take 1 tablet by mouth nightly 60 tablet 3    tiZANidine (ZANAFLEX) 4 MG tablet Take 1/2-1 tablet po BID 60 tablet 0    UNIFINE PENTIPS 31G X 8 MM MISC USE WITH insulin pens 100 each 5    alirocumab (PRALUENT) 75 MG/ML SOAJ injection pen Inject 1 mL into the skin every 14 days 6 pen 3    torsemide (DEMADEX) 10 MG tablet Take 10 mg by mouth daily      omeprazole (PRILOSEC) 20 MG delayed release capsule TAKE 1 CAPSULE BY MOUTH DAILY 30 capsule 1    metoprolol succinate (TOPROL XL) 50 MG extended release tablet TAKE 0.5 TABLETS BY MOUTH 2 TIMES DAILY (WITH MEALS) (Patient taking differently: Take 25 mg by mouth daily ) 30 tablet 5    aspirin 81 MG chewable tablet Take 1 tablet by mouth daily 30 tablet 3    dicyclomine (BENTYL) 20 MG tablet Take 20 mg by mouth 4 times daily (before meals and nightly)       nitroGLYCERIN (NITROSTAT) 0.4 MG SL tablet Place 1 tablet under the tongue every 5 minutes as needed for Chest pain up to max of 3 total doses. If no relief after 1 dose, call 911. 25 tablet 3    Nutritional Supplements (ENSURE) LIQD Take 1 Can by mouth 3 times daily as needed (nutrition) 90 Can 5    atorvastatin (LIPITOR) 80 MG tablet Take 1 tablet by mouth nightly 90 tablet 5    amLODIPine (NORVASC) 5 MG tablet Take 1 tablet by mouth 2 times daily 180 tablet 5    blood glucose test strips (TRUE METRIX BLOOD GLUCOSE TEST) strip 1 each by Does not apply route 2 times daily 100 each 5    clopidogrel (PLAVIX) 75 MG tablet TAKE 1 TABLET BY MOUTH DA JULIEN 90 tablet 5    albuterol (PROVENTIL) (2.5 MG/3ML) 0.083% nebulizer solution Take 3 mLs by nebulization every 4 hours as needed for Wheezing 120 each 5    budesonide-formoterol (SYMBICORT) 160-4.5 MCG/ACT AERO Inhale 2 puffs into the lungs daily 2 Inhaler 5    albuterol sulfate HFA (VENTOLIN HFA) 108 (90 Base) MCG/ACT inhaler Inhale 2 puffs into the lungs every 4 hours as needed for Wheezing or Shortness of Breath 3 Inhaler 5    ranolazine (RANEXA) 1000 MG extended release tablet Take 1 tablet by mouth 2 times daily 60 tablet 8    rizatriptan (MAXALT) 10 MG tablet Take 1 tablet by mouth once as needed for Migraine May repeat in 2 hours if needed 9 tablet 1     No current facility-administered medications for this visit. I will continue her current medication regimen  which is part of the above treatment schedule. It has been helping with Ms. Meza's chronic  medical problems which for this visit include:   Diagnoses of Chronic pain syndrome, Chronic migraine, Chronic painful diabetic patient.     Juancho Delgado MD.    Cc: Tenzin Rodriguez MD

## 2021-07-22 ENCOUNTER — CARE COORDINATION (OUTPATIENT)
Dept: CASE MANAGEMENT | Age: 66
End: 2021-07-22

## 2021-07-22 NOTE — CARE COORDINATION
[facility] Transitions Follow Up Call    2021    Patient: Cedric Herrera  Patient : 1955   MRN: [id number]  Reason for Admission: Chest pain  Discharge Date: 21 RARS: Readmission Risk Score: 39         Spoke with: Cedric Herrera - patient    Care Transitions Subsequent and Final Call    Subsequent and Final Calls  Care Transitions Interventions  Other Interventions:         Final attempt at CTN follow up phone call. Rosa Elena Hawthorne states she is doing \"okay\". She is following up with her physicians and has some diagnostic testing scheduled. Maren denies any needs at this time. Informed Maren this would be the final CTN outreach.     Follow Up  Future Appointments   Date Time Provider Yoni D eJesus   2021  9:30 AM SCHEDULE, [facility] ECHO ROOM 2 [facility]   2021 12:00 PM Flako Gallardo MD [facility]   2021  9:15 AM Jeri Stanley MD [facility]       Fady Young RN

## 2021-07-29 ENCOUNTER — PATIENT MESSAGE (OUTPATIENT)
Dept: CARDIOLOGY CLINIC | Age: 66
End: 2021-07-29

## 2021-07-30 ENCOUNTER — HOSPITAL ENCOUNTER (EMERGENCY)
Age: 66
Discharge: HOME | End: 2021-07-30
Attending: EMERGENCY MEDICINE
Payer: COMMERCIAL

## 2021-07-30 ENCOUNTER — APPOINTMENT (OUTPATIENT)
Dept: GENERAL RADIOLOGY | Age: 66
End: 2021-07-30
Payer: COMMERCIAL

## 2021-07-30 VITALS
DIASTOLIC BLOOD PRESSURE: 59 MMHG | HEART RATE: 63 BPM | OXYGEN SATURATION: 99 % | SYSTOLIC BLOOD PRESSURE: 169 MMHG | HEIGHT: 60 IN | RESPIRATION RATE: 12 BRPM | WEIGHT: 115.52 LBS | BODY MASS INDEX: 22.68 KG/M2 | TEMPERATURE: 98 F

## 2021-07-30 DIAGNOSIS — I25.119 CORONARY ARTERY DISEASE INVOLVING NATIVE HEART WITH ANGINA PECTORIS, UNSPECIFIED VESSEL OR LESION TYPE (HCC): ICD-10-CM

## 2021-07-30 DIAGNOSIS — R07.9 CHEST PAIN, UNSPECIFIED TYPE: ICD-10-CM

## 2021-07-30 DIAGNOSIS — R07.2 PRECORDIAL PAIN: Primary | ICD-10-CM

## 2021-07-30 LAB
A/G RATIO: 1 (ref 1.1–2.2)
ALBUMIN SERPL-MCNC: 3.4 G/DL (ref 3.4–5)
ALP BLD-CCNC: 128 U/L (ref 40–129)
ALT SERPL-CCNC: 21 U/L (ref 10–40)
ANION GAP SERPL CALCULATED.3IONS-SCNC: 9 MMOL/L (ref 3–16)
AST SERPL-CCNC: 18 U/L (ref 15–37)
BASOPHILS ABSOLUTE: 0 K/UL (ref 0–0.2)
BASOPHILS RELATIVE PERCENT: 0.3 %
BILIRUB SERPL-MCNC: <0.2 MG/DL (ref 0–1)
BUN BLDV-MCNC: 27 MG/DL (ref 7–20)
CALCIUM SERPL-MCNC: 8.9 MG/DL (ref 8.3–10.6)
CHLORIDE BLD-SCNC: 104 MMOL/L (ref 99–110)
CO2: 24 MMOL/L (ref 21–32)
CREAT SERPL-MCNC: 1.2 MG/DL (ref 0.6–1.2)
EOSINOPHILS ABSOLUTE: 0.2 K/UL (ref 0–0.6)
EOSINOPHILS RELATIVE PERCENT: 3.6 %
GFR AFRICAN AMERICAN: 55
GFR NON-AFRICAN AMERICAN: 45
GLOBULIN: 3.4 G/DL
GLUCOSE BLD-MCNC: 117 MG/DL (ref 70–99)
HCT VFR BLD CALC: 26.6 % (ref 36–48)
HEMOGLOBIN: 8.9 G/DL (ref 12–16)
LYMPHOCYTES ABSOLUTE: 1.4 K/UL (ref 1–5.1)
LYMPHOCYTES RELATIVE PERCENT: 29.7 %
MCH RBC QN AUTO: 31.6 PG (ref 26–34)
MCHC RBC AUTO-ENTMCNC: 33.3 G/DL (ref 31–36)
MCV RBC AUTO: 94.9 FL (ref 80–100)
MONOCYTES ABSOLUTE: 0.4 K/UL (ref 0–1.3)
MONOCYTES RELATIVE PERCENT: 8.2 %
NEUTROPHILS ABSOLUTE: 2.8 K/UL (ref 1.7–7.7)
NEUTROPHILS RELATIVE PERCENT: 58.2 %
PDW BLD-RTO: 15 % (ref 12.4–15.4)
PLATELET # BLD: 218 K/UL (ref 135–450)
PMV BLD AUTO: 7.6 FL (ref 5–10.5)
POTASSIUM SERPL-SCNC: 5.1 MMOL/L (ref 3.5–5.1)
PRO-BNP: 758 PG/ML (ref 0–124)
RBC # BLD: 2.8 M/UL (ref 4–5.2)
SODIUM BLD-SCNC: 137 MMOL/L (ref 136–145)
TOTAL PROTEIN: 6.8 G/DL (ref 6.4–8.2)
TROPONIN: <0.01 NG/ML
TROPONIN: <0.01 NG/ML
WBC # BLD: 4.9 K/UL (ref 4–11)

## 2021-07-30 PROCEDURE — 93005 ELECTROCARDIOGRAM TRACING: CPT | Performed by: PHYSICIAN ASSISTANT

## 2021-07-30 PROCEDURE — 99285 EMERGENCY DEPT VISIT HI MDM: CPT

## 2021-07-30 PROCEDURE — 71045 X-RAY EXAM CHEST 1 VIEW: CPT

## 2021-07-30 PROCEDURE — 83880 ASSAY OF NATRIURETIC PEPTIDE: CPT

## 2021-07-30 PROCEDURE — 96374 THER/PROPH/DIAG INJ IV PUSH: CPT

## 2021-07-30 PROCEDURE — 84484 ASSAY OF TROPONIN QUANT: CPT

## 2021-07-30 PROCEDURE — 6360000002 HC RX W HCPCS: Performed by: EMERGENCY MEDICINE

## 2021-07-30 PROCEDURE — 85025 COMPLETE CBC W/AUTO DIFF WBC: CPT

## 2021-07-30 PROCEDURE — 36415 COLL VENOUS BLD VENIPUNCTURE: CPT

## 2021-07-30 PROCEDURE — 80053 COMPREHEN METABOLIC PANEL: CPT

## 2021-07-30 PROCEDURE — 96375 TX/PRO/DX INJ NEW DRUG ADDON: CPT

## 2021-07-30 PROCEDURE — 6370000000 HC RX 637 (ALT 250 FOR IP): Performed by: PHYSICIAN ASSISTANT

## 2021-07-30 RX ORDER — MORPHINE SULFATE 4 MG/ML
4 INJECTION, SOLUTION INTRAMUSCULAR; INTRAVENOUS ONCE
Status: COMPLETED | OUTPATIENT
Start: 2021-07-30 | End: 2021-07-30

## 2021-07-30 RX ORDER — ONDANSETRON 2 MG/ML
4 INJECTION INTRAMUSCULAR; INTRAVENOUS ONCE
Status: COMPLETED | OUTPATIENT
Start: 2021-07-30 | End: 2021-07-30

## 2021-07-30 RX ORDER — OXYCODONE HYDROCHLORIDE AND ACETAMINOPHEN 5; 325 MG/1; MG/1
1 TABLET ORAL ONCE
Status: COMPLETED | OUTPATIENT
Start: 2021-07-30 | End: 2021-07-30

## 2021-07-30 RX ORDER — NITROGLYCERIN 0.4 MG/1
0.4 TABLET SUBLINGUAL EVERY 5 MIN PRN
Status: DISCONTINUED | OUTPATIENT
Start: 2021-07-30 | End: 2021-07-30 | Stop reason: HOSPADM

## 2021-07-30 RX ORDER — ASPIRIN 81 MG/1
243 TABLET, CHEWABLE ORAL ONCE
Status: COMPLETED | OUTPATIENT
Start: 2021-07-30 | End: 2021-07-30

## 2021-07-30 RX ADMIN — NITROGLYCERIN 0.4 MG: 0.4 TABLET, ORALLY DISINTEGRATING SUBLINGUAL at 13:07

## 2021-07-30 RX ADMIN — OXYCODONE HYDROCHLORIDE AND ACETAMINOPHEN 1 TABLET: 5; 325 TABLET ORAL at 14:10

## 2021-07-30 RX ADMIN — NITROGLYCERIN 0.4 MG: 0.4 TABLET, ORALLY DISINTEGRATING SUBLINGUAL at 12:12

## 2021-07-30 RX ADMIN — ASPIRIN 81 MG 243 MG: 81 TABLET ORAL at 12:12

## 2021-07-30 RX ADMIN — MORPHINE SULFATE 4 MG: 4 INJECTION, SOLUTION INTRAMUSCULAR; INTRAVENOUS at 12:13

## 2021-07-30 RX ADMIN — ONDANSETRON 4 MG: 2 INJECTION INTRAMUSCULAR; INTRAVENOUS at 12:12

## 2021-07-30 ASSESSMENT — PAIN DESCRIPTION - PAIN TYPE: TYPE: ACUTE PAIN

## 2021-07-30 ASSESSMENT — PAIN DESCRIPTION - LOCATION
LOCATION: CHEST

## 2021-07-30 ASSESSMENT — HEART SCORE: ECG: 1

## 2021-07-30 ASSESSMENT — PAIN SCALES - GENERAL
PAINLEVEL_OUTOF10: 6
PAINLEVEL_OUTOF10: 8
PAINLEVEL_OUTOF10: 8
PAINLEVEL_OUTOF10: 7
PAINLEVEL_OUTOF10: 8
PAINLEVEL_OUTOF10: 6

## 2021-07-30 ASSESSMENT — ENCOUNTER SYMPTOMS
ABDOMINAL PAIN: 0
NAUSEA: 1
SHORTNESS OF BREATH: 0
VOMITING: 0

## 2021-07-30 NOTE — ED NOTES
Patient ambulated to the restroom without complications but stated that ambulating to the restroom made her chest pain worse and made her a little SOB. ED provider notified. Troponin and EKG repeated.   [facility] [id number] [facility]  07/30/21 4303

## 2021-07-30 NOTE — TELEPHONE ENCOUNTER
CHEST PAIN QUESTIONS:    1.) Are you having chest pain now? yes  2.) When did the symptoms start? Last night  3.) If you've had a prior stent, is the pain similar to that in the past?  4.) Is it constant or intermittent? It was intermittent, now constant  5.) Where is the pain located? Center of chest and shoulder blade area of her back  6.) Does it worsen with activity? yes  7.) Does anything make the chest pain better? 8.) Have you taken nitro? Yes, 2 times  If so, did it improve the pain? yes    9.) Are you having:  [] SOB  [x] Nausea/vomiting  [] Dizziness  [] Sweating    Spoke with YOLETTE Lunsford who advised patient to proceed to the ED. Patient understood.

## 2021-07-30 NOTE — ED NOTES
.Pt discharged at this time. Discharge instructions  reviewed,  Questions were answered. PT verbalized understanding. VSS, Afebrile. Follow up appointments were discussed.          [address]  07/30/21 1296

## 2021-07-30 NOTE — ED NOTES
Multiple attempts for iv made, unsuccessful.  Charge nurse will attempt iv     Solange Marina RN  07/30/21 8693

## 2021-07-30 NOTE — ED TRIAGE NOTES
Pt here with chest pain that started last pm. Pt took 3 nitro tabs and had relief of pain \"but it dropped my blood pressure\" pt admits to chest pain currently 8/10, along with nausea. Pt had 30 day monitor on and returned it on Wednesday.

## 2021-07-30 NOTE — TELEPHONE ENCOUNTER
Attempted to call patient not able to leave VM as mailbox is full. Will send patient a [employer] message.

## 2021-07-30 NOTE — ED PROVIDER NOTES
I independently evaluated and obtained a history and physical on Wade Maher. All diagnostic, treatment, and disposition assistants were made to myself in conjunction the advanced practice provider. For further details of this patient's emergency department encounter, please see the advanced practice provider's documentation. History: Patient is a 50-year-old female with multiple medical history presents with chest pain. Had some nausea but no active vomiting. Patient states took some 3 nitro last night. And aspirin today. Continued pain and pressure and came into the emergency department. Patient has frequent ED visits. Patient has a schedule echocardiogram today and she did call her cardiologist who told her to come immediately to the emergency department for evaluation. Patient was seen in conjunction with STEPHEN. Patient still having pain in the epigastric area. Does radiate. Is been no fevers no chills no shortness of breath. Physician Exam: Patient is a elderly 50-year-old black female with normal vital signs pulse is 63. Patient's lungs were clear to auscultation no wheezing. Heart regular rate rhythm without any ectopy. Abdomen is soft neurovascular exam was normal.  No peripheral edema abdomen is soft. No tenderness. Labs were as follows. ..   Labs Reviewed   CBC WITH AUTO DIFFERENTIAL - Abnormal; Notable for the following components:       Result Value    RBC 2.80 (*)     Hemoglobin 8.9 (*)     Hematocrit 26.6 (*)     All other components within normal limits    Narrative:     Performed at:  [address] [address]   Phone [phone]   COMPREHENSIVE METABOLIC PANEL - Abnormal; Notable for the following components:    Glucose 117 (*)     BUN 27 (*)     GFR Non- 45 (*)     GFR African American 55 (*)     Albumin/Globulin Ratio 1.0 (*)     All other components within normal limits    Narrative: Performed at:  [facility]  [address] [place] [address]   Phone [phone]   BRAIN NATRIURETIC PEPTIDE - Abnormal; Notable for the following components:    Pro- (*)     All other components within normal limits    Narrative:     Performed at:  [facility]  [address] [place] [address]   Phone [phone]   TROPONIN    Narrative:     Performed at:  [facility]  [address] [place] [address]   Phone [phone]   TROPONIN    Narrative:     Performed at:  [facility]  [address] [place] [address]   Phone [phone]     XR CHEST PORTABLE   Final Result   No acute cardiopulmonary disease. EKG interpretation    Sinus rhythm with a short NE ventricular rate of 84 baseline artifact otherwise nothing acute    Patient will be given some morphine and some Zofran. Patient given her history and will most likely need to be admitted to the hospital with discussed with the hospitalist.      After discussing with Dr. Emir Rogers we did notify the cardiologist as well who was on call. We will repeat EKGs and repeat troponin in the ED. If negative patient may be able to be discharged home. I did reevaluate patient and she is fine with that. Patient is very receptive. We will go home. Patient will call daughter. We will follow-up with cardiology and family doctor. Will need to reschedule her echocardiogram.  Patient is pain-free.         Marcio Nieves MD  07/30/21 7579

## 2021-07-30 NOTE — TELEPHONE ENCOUNTER
From: Alvaro Gonzales  To: Dorothy Thakur MD  Sent: 7/29/2021 9:12 PM EDT  Subject: Non-Urgent Medical Question    Having this heavy feeling in the left side of my chest

## 2021-07-30 NOTE — ED PROVIDER NOTES
 DM2 (diabetes mellitus, type 2) ([facility]     Dysarthria     Fibromyalgia 6/7/2016    Headache(784.0) 2/19/2013    Hemisensory loss     Hyperlipidemia     Hypertension     IBS (irritable bowel syndrome)     Inferior vena cava occlusion (HCC)     Irritable bowel syndrome     Keratitis     MI, old     Neuropathy     Superior vena cava obstruction     Temporal arteritis (HCC) 2/24/2014       SURGICAL HISTORY           Procedure Laterality Date    ABLATION OF DYSRHYTHMIC FOCUS  11/2020    ARTERY BIOPSY Right 04/23/2014    RIGHT TEMPORAL ARTERY BIOPSY    CAROTID ARTERY SURGERY Left     clean up per pt    CATARACT REMOVAL Bilateral     CHOLECYSTECTOMY      COLONOSCOPY N/A 4/9/2021    COLONOSCOPY WITH BIOPSY performed by Darrall Sever, MD at [address]    HYSTERECTOMY      JOINT REPLACEMENT Right     KNEE ARTHROSCOPY Right     PORT SURGERY      x`s 4 & removal of 4    PTCA  10/2019    LAD and RCA inrtervention    TUNNELED VENOUS PORT PLACEMENT      left thigh. SMART PORT-----Removed    UPPER GASTROINTESTINAL ENDOSCOPY N/A 7/6/2020    EGD DIAGNOSTIC ONLY performed by Heather Dumont MD at [facility]       Discharge Medication List as of 7/30/2021  4:31 PM      CONTINUE these medications which have NOT CHANGED    Details   DULoxetine (CYMBALTA) 60 MG extended release capsule Take 1 capsule by mouth daily, Disp-30 capsule, R-1Normal      QUEtiapine (SEROQUEL) 25 MG tablet Take 1 tablet by mouth nightly, Disp-60 tablet, R-3Normal      tiZANidine (ZANAFLEX) 4 MG tablet Take 1/2-1 tablet po BID, Disp-60 tablet, R-0Normal      oxyCODONE-acetaminophen (PERCOCET) 7.5-325 MG per tablet Take 1 tablet by mouth every 6 hours as needed for Pain (max 3 day) for up to 28 days. , Disp-84 tablet, R-0Print      Ubrogepant (UBRELVY) 50 MG TABS Talk 1 tablet as needed at start of headaches, can repeat in 24 hours, Disp-10 tablet, R-0Normal      insulin aspart (NOVOLOG FLEXPEN) 100 UNIT/ML injection pen Inject 3 Units into the skin 3 times daily (before meals), Disp-5 pen, R-2Normal      insulin glargine (BASAGLAR KWIKPEN) 100 UNIT/ML injection pen Inject 8 Units into the skin 2 times daily, Disp-5 pen, R-3Normal      ondansetron (ZOFRAN) 4 MG tablet Take 1 tablet by mouth every 8 hours as needed for Nausea or Vomiting, Disp-21 tablet, R-1Normal      torsemide (DEMADEX) 10 MG tablet Take 10 mg by mouth dailyHistorical Med      omeprazole (PRILOSEC) 20 MG delayed release capsule TAKE 1 CAPSULE BY MOUTH DAILY, Disp-30 capsule, R-1Normal      metoprolol succinate (TOPROL XL) 50 MG extended release tablet TAKE 0.5 TABLETS BY MOUTH 2 TIMES DAILY (WITH MEALS), Disp-30 tablet, R-5Normal      aspirin 81 MG chewable tablet Take 1 tablet by mouth daily, Disp-30 tablet, R-3Normal      dicyclomine (BENTYL) 20 MG tablet Take 20 mg by mouth 4 times daily (before meals and nightly) Historical Med      atorvastatin (LIPITOR) 80 MG tablet Take 1 tablet by mouth nightly, Disp-90 tablet,R-5Normal      amLODIPine (NORVASC) 5 MG tablet Take 1 tablet by mouth 2 times daily, Disp-180 tablet,R-5Normal      clopidogrel (PLAVIX) 75 MG tablet TAKE 1 TABLET BY MOUTH ABIMBOLA VICTORIA, Disp-90 tablet,R-5Normal      albuterol (PROVENTIL) (2.5 MG/3ML) 0.083% nebulizer solution Take 3 mLs by nebulization every 4 hours as needed for Wheezing, Disp-120 each, R-5Normal      budesonide-formoterol (SYMBICORT) 160-4.5 MCG/ACT AERO Inhale 2 puffs into the lungs daily, Disp-2 Inhaler, R-5Normal      albuterol sulfate HFA (VENTOLIN HFA) 108 (90 Base) MCG/ACT inhaler Inhale 2 puffs into the lungs every 4 hours as needed for Wheezing or Shortness of Breath, Disp-3 Inhaler, R-5Normal      ranolazine (RANEXA) 1000 MG extended release tablet Take 1 tablet by mouth 2 times daily, Disp-60 tablet, R-8Normal      !! ULTICARE MINI PEN NEEDLES 31G X 6 MM MISC Disp-100 each, R-0, Normal      Erenumab-aooe 140 MG/ML SOAJ Inject 140 mLs into the skin every 30 days, Disp-1 pen, R-0Normal      !! UNIFINE PENTIPS 31G X 8 MM MISC Disp-100 each, R-5, Normal      alirocumab (PRALUENT) 75 MG/ML SOAJ injection pen Inject 1 mL into the skin every 14 days, Disp-6 pen, R-3Normal      nitroGLYCERIN (NITROSTAT) 0.4 MG SL tablet Place 1 tablet under the tongue every 5 minutes as needed for Chest pain up to max of 3 total doses. If no relief after 1 dose, call 911., Disp-25 tablet, R-3Normal      Nutritional Supplements (ENSURE) LIQD Take 1 Can by mouth 3 times daily as needed (nutrition), Disp-90 Can, R-5Normal      blood glucose test strips (TRUE METRIX BLOOD GLUCOSE TEST) strip 1 each by Does not apply route 2 times daily, Disp-100 each,R-5Normal       !! - Potential duplicate medications found. Please discuss with provider. ALLERGIES     Patient has no known allergies. FAMILY HISTORY           Problem Relation Age of Onset    Diabetes Mother     Hypertension Mother     High Cholesterol Mother     Stroke Mother     Cancer Mother     No Known Problems Paternal Grandfather         lung issues      Family Status   Relation Name Status    Mother      Father      PGF  (Not Specified)        SOCIAL HISTORY      reports that she quit smoking about 3 years ago. Her smoking use included cigarettes. She has a 17.50 pack-year smoking history. She has never used smokeless tobacco. She reports that she does not drink alcohol and does not use drugs. PHYSICAL EXAM    (up to 7 for level 4, 8 or more for level 5)     ED Triage Vitals [21 0933]   BP Temp Temp src Pulse Resp SpO2 Height Weight   (!) 181/84 98 °F (36.7 °C) -- 71 (!) 70 100 % 5' (1.524 m) 115 lb 8.3 oz (52.4 kg)       Physical Exam  Constitutional:       General: She is not in acute distress. Appearance: Normal appearance. She is well-developed. She is not ill-appearing, toxic-appearing or diaphoretic.    HENT: Hematocrit 26.6 (L) 36.0 - 48.0 %    MCV 94.9 80.0 - 100.0 fL    MCH 31.6 26.0 - 34.0 pg    MCHC 33.3 31.0 - 36.0 g/dL    RDW 15.0 12.4 - 15.4 %    Platelets 981 092 - 222 K/uL    MPV 7.6 5.0 - 10.5 fL    Neutrophils % 58.2 %    Lymphocytes % 29.7 %    Monocytes % 8.2 %    Eosinophils % 3.6 %    Basophils % 0.3 %    Neutrophils Absolute 2.8 1.7 - 7.7 K/uL    Lymphocytes Absolute 1.4 1.0 - 5.1 K/uL    Monocytes Absolute 0.4 0.0 - 1.3 K/uL    Eosinophils Absolute 0.2 0.0 - 0.6 K/uL    Basophils Absolute 0.0 0.0 - 0.2 K/uL   Comprehensive Metabolic Panel   Result Value Ref Range    Sodium 137 136 - 145 mmol/L    Potassium 5.1 3.5 - 5.1 mmol/L    Chloride 104 99 - 110 mmol/L    CO2 24 21 - 32 mmol/L    Anion Gap 9 3 - 16    Glucose 117 (H) 70 - 99 mg/dL    BUN 27 (H) 7 - 20 mg/dL    CREATININE 1.2 0.6 - 1.2 mg/dL    GFR Non-African American 45 (A) >60    GFR  55 (A) >60    Calcium 8.9 8.3 - 10.6 mg/dL    Total Protein 6.8 6.4 - 8.2 g/dL    Albumin 3.4 3.4 - 5.0 g/dL    Albumin/Globulin Ratio 1.0 (L) 1.1 - 2.2    Total Bilirubin <0.2 0.0 - 1.0 mg/dL    Alkaline Phosphatase 128 40 - 129 U/L    ALT 21 10 - 40 U/L    AST 18 15 - 37 U/L    Globulin 3.4 g/dL   Brain Natriuretic Peptide   Result Value Ref Range    Pro- (H) 0 - 124 pg/mL   Troponin   Result Value Ref Range    Troponin <0.01 <0.01 ng/mL   Troponin   Result Value Ref Range    Troponin <0.01 <0.01 ng/mL       All other labs were withinnormal range or not returned as of this dictation. EMERGENCY DEPARTMENT COURSE and DIFFERENTIAL DIAGNOSIS/MDM:   Vitals:    Vitals:    07/30/21 1307 07/30/21 1408 07/30/21 1510 07/30/21 1622   BP: (!) 179/65 (!) 152/63 (!) 152/56 (!) 169/59   Pulse: 65 65 60 63   Resp: 17 18 17 12   Temp:       SpO2:  97%  99%   Weight:       Height:           Patient was nontoxic, well appearing, afebrile with normal vital signs wiith the excerption of HTN. . Saturating well on room air. Reviewed her chart.     NM stress test

## 2021-07-31 LAB
EKG ATRIAL RATE: 84 BPM
EKG DIAGNOSIS: NORMAL
EKG P AXIS: 72 DEGREES
EKG P-R INTERVAL: 102 MS
EKG Q-T INTERVAL: 376 MS
EKG QRS DURATION: 68 MS
EKG QTC CALCULATION (BAZETT): 444 MS
EKG R AXIS: 52 DEGREES
EKG T AXIS: 57 DEGREES
EKG VENTRICULAR RATE: 84 BPM

## 2021-07-31 PROCEDURE — 93010 ELECTROCARDIOGRAM REPORT: CPT | Performed by: INTERNAL MEDICINE

## 2021-08-02 LAB
EKG ATRIAL RATE: 64 BPM
EKG DIAGNOSIS: NORMAL
EKG P AXIS: -15 DEGREES
EKG P-R INTERVAL: 88 MS
EKG Q-T INTERVAL: 446 MS
EKG QRS DURATION: 76 MS
EKG QTC CALCULATION (BAZETT): 460 MS
EKG R AXIS: 47 DEGREES
EKG T AXIS: 73 DEGREES
EKG VENTRICULAR RATE: 64 BPM

## 2021-08-02 PROCEDURE — 93010 ELECTROCARDIOGRAM REPORT: CPT | Performed by: INTERNAL MEDICINE

## 2021-08-03 NOTE — TELEPHONE ENCOUNTER
[facility] called stating they need   -Chart notes  - Official diagnosis, icd10 codes  -Previously tried therapies    Faxed to [phone] for Ubrogepant (UBRELVY) 50 MG TABS PA

## 2021-08-04 ENCOUNTER — TELEPHONE (OUTPATIENT)
Dept: PRIMARY CARE CLINIC | Age: 66
End: 2021-08-04

## 2021-08-04 NOTE — TELEPHONE ENCOUNTER
Rep stated that missing diagnosis box not check off for continuous glucose monitoring, per patient's insurance has to be noted,  please faxed to [phone]

## 2021-08-05 ENCOUNTER — TELEPHONE (OUTPATIENT)
Dept: PRIMARY CARE CLINIC | Age: 66
End: 2021-08-05

## 2021-08-05 NOTE — TELEPHONE ENCOUNTER
Pt suspects a re-occuring UTI, and wants to know if you will send in antibiotics again. Pls advise. thank you! Thank you!     [facility] pharmacy: [phone]

## 2021-08-05 NOTE — TELEPHONE ENCOUNTER
Patient called to check on the status of her Ubrogepant (UBRELVY) 50 MG TABS PA. She is also asking for something else to be prescribed while she waits on the Ubrelvy to be approved.     [employer] / [employer]

## 2021-08-05 NOTE — TELEPHONE ENCOUNTER
Called patient to advise her that per [facility] she can  samples until Rx is approved. I advised patient also that we will have her sign a release of record for us to send records to [address] [place] [address]. Patient voiced her understanding.

## 2021-08-06 RX ORDER — SULFAMETHOXAZOLE AND TRIMETHOPRIM 800; 160 MG/1; MG/1
1 TABLET ORAL 2 TIMES DAILY
Qty: 10 TABLET | Refills: 0 | Status: SHIPPED | OUTPATIENT
Start: 2021-08-06 | End: 2021-08-11

## 2021-08-12 DIAGNOSIS — I48.0 PAF (PAROXYSMAL ATRIAL FIBRILLATION) (HCC): ICD-10-CM

## 2021-08-20 ENCOUNTER — OFFICE VISIT (OUTPATIENT)
Dept: PAIN MANAGEMENT | Age: 66
End: 2021-08-20
Payer: MEDICARE

## 2021-08-20 VITALS
DIASTOLIC BLOOD PRESSURE: 78 MMHG | BODY MASS INDEX: 23.05 KG/M2 | HEART RATE: 66 BPM | WEIGHT: 118 LBS | SYSTOLIC BLOOD PRESSURE: 110 MMHG

## 2021-08-20 DIAGNOSIS — G89.4 CHRONIC PAIN SYNDROME: ICD-10-CM

## 2021-08-20 DIAGNOSIS — E11.40 CHRONIC PAINFUL DIABETIC NEUROPATHY (HCC): ICD-10-CM

## 2021-08-20 DIAGNOSIS — M79.7 FIBROMYALGIA: ICD-10-CM

## 2021-08-20 DIAGNOSIS — G43.111 INTRACTABLE MIGRAINE WITH AURA WITH STATUS MIGRAINOSUS: ICD-10-CM

## 2021-08-20 DIAGNOSIS — F33.1 MODERATE EPISODE OF RECURRENT MAJOR DEPRESSIVE DISORDER (HCC): ICD-10-CM

## 2021-08-20 DIAGNOSIS — M50.30 DDD (DEGENERATIVE DISC DISEASE), CERVICAL: ICD-10-CM

## 2021-08-20 DIAGNOSIS — M75.81 TENDINITIS OF RIGHT ROTATOR CUFF: ICD-10-CM

## 2021-08-20 DIAGNOSIS — M51.36 DDD (DEGENERATIVE DISC DISEASE), LUMBAR: ICD-10-CM

## 2021-08-20 DIAGNOSIS — F51.01 PRIMARY INSOMNIA: ICD-10-CM

## 2021-08-20 PROCEDURE — 2022F DILAT RTA XM EVC RTNOPTHY: CPT | Performed by: INTERNAL MEDICINE

## 2021-08-20 PROCEDURE — 3052F HG A1C>EQUAL 8.0%<EQUAL 9.0%: CPT | Performed by: INTERNAL MEDICINE

## 2021-08-20 PROCEDURE — 99214 OFFICE O/P EST MOD 30 MIN: CPT | Performed by: INTERNAL MEDICINE

## 2021-08-20 PROCEDURE — 3017F COLORECTAL CA SCREEN DOC REV: CPT | Performed by: INTERNAL MEDICINE

## 2021-08-20 PROCEDURE — G8420 CALC BMI NORM PARAMETERS: HCPCS | Performed by: INTERNAL MEDICINE

## 2021-08-20 PROCEDURE — G8427 DOCREV CUR MEDS BY ELIG CLIN: HCPCS | Performed by: INTERNAL MEDICINE

## 2021-08-20 PROCEDURE — 1036F TOBACCO NON-USER: CPT | Performed by: INTERNAL MEDICINE

## 2021-08-20 RX ORDER — DULOXETIN HYDROCHLORIDE 60 MG/1
60 CAPSULE, DELAYED RELEASE ORAL DAILY
Qty: 30 CAPSULE | Refills: 1 | Status: SHIPPED | OUTPATIENT
Start: 2021-08-20 | End: 2021-09-21 | Stop reason: SDUPTHER

## 2021-08-20 RX ORDER — QUETIAPINE FUMARATE 25 MG/1
25 TABLET, FILM COATED ORAL NIGHTLY
Qty: 60 TABLET | Refills: 1 | Status: SHIPPED | OUTPATIENT
Start: 2021-08-20 | End: 2021-09-21 | Stop reason: SDUPTHER

## 2021-08-20 RX ORDER — OXYCODONE AND ACETAMINOPHEN 7.5; 325 MG/1; MG/1
1 TABLET ORAL EVERY 6 HOURS PRN
Qty: 84 TABLET | Refills: 0 | Status: SHIPPED | OUTPATIENT
Start: 2021-08-20 | End: 2021-08-23 | Stop reason: SDUPTHER

## 2021-08-20 RX ORDER — TIZANIDINE 4 MG/1
2-4 TABLET ORAL 2 TIMES DAILY PRN
Qty: 60 TABLET | Refills: 0 | Status: SHIPPED | OUTPATIENT
Start: 2021-08-20 | End: 2021-09-21 | Stop reason: SDUPTHER

## 2021-08-20 RX ORDER — UBROGEPANT 50 MG/1
TABLET ORAL
Qty: 10 TABLET | Refills: 0 | Status: SHIPPED | OUTPATIENT
Start: 2021-08-20 | End: 2021-09-15

## 2021-08-20 NOTE — PROGRESS NOTES
Jeff Appl  1955  [id number]    HISTORY OF PRESENT ILLNESS:  Ms. Parisa Cohen is a 60 y.o. female returns for a follow up visit for multiple medical problems. Her  presenting problems are   1. Intractable migraine with aura with status migrainosus    2. Chronic migraine    3. Chronic pain syndrome    4. Chronic painful diabetic neuropathy ([facility] [address]    5. DDD (degenerative disc disease), cervical    6. Tendinitis of right rotator cuff    7. DDD (degenerative disc disease), lumbar    8. Fibromyalgia    9. Moderate episode of recurrent major depressive disorder ([facility] [address]    10. Primary insomnia    . As per information/history obtained from the PADT(patient assessment and documentation tool) -  She complains of pain in the head, hands Bilateral and lower back with radiation to the buttocks, hips Bilateral, upper leg Bilateral, knees Bilateral, lower leg Bilateral, ankles Bilateral and feet Bilateral She rates the pain 8/10 and describes it as sharp, burning, numbness, pins and needles. Pain is made worse by: movement, walking, standing, sitting, bending, lifting. Current treatment regimen has helped relieve about 20% of the pain. She denies side effects from the current pain regimen. Patient reports that since the last follow up visit the physical functioning is better, family/social relationships are unchanged, mood is unchanged and sleep patterns are unchanged, and that the overall functioning is unchanged. Patient denies neurological bowel or bladder. Patient denies misusing/abusing her narcotic pain medications or using any illegal drugs. There are No indicators for possible drug abuse, addiction or diversion problems. Upon obtaining the medical history from Ms. Parisa Cohen regarding today's office visit for her presenting problems, patient states she has been doing about the same, her pain has been manageable.  She states she is still having headaches, she want not able to get the [place], she states she did use pen 2    insulin glargine (BASAGLAR KWIKPEN) 100 UNIT/ML injection pen Inject 8 Units into the skin 2 times daily 5 pen 3    ondansetron (ZOFRAN) 4 MG tablet Take 1 tablet by mouth every 8 hours as needed for Nausea or Vomiting 21 tablet 1    ULTICARE MINI PEN NEEDLES 31G X 6 MM MISC USE WITH INSULINS FOUR TIMES A  each 0    Erenumab-aooe 140 MG/ML SOAJ Inject 140 mLs into the skin every 30 days 1 pen 0    UNIFINE PENTIPS 31G X 8 MM MISC USE WITH insulin pens 100 each 5    alirocumab (PRALUENT) 75 MG/ML SOAJ injection pen Inject 1 mL into the skin every 14 days 6 pen 3    torsemide (DEMADEX) 10 MG tablet Take 10 mg by mouth daily      omeprazole (PRILOSEC) 20 MG delayed release capsule TAKE 1 CAPSULE BY MOUTH DAILY 30 capsule 1    metoprolol succinate (TOPROL XL) 50 MG extended release tablet TAKE 0.5 TABLETS BY MOUTH 2 TIMES DAILY (WITH MEALS) 30 tablet 5    aspirin 81 MG chewable tablet Take 1 tablet by mouth daily 30 tablet 3    dicyclomine (BENTYL) 20 MG tablet Take 20 mg by mouth 4 times daily (before meals and nightly)       nitroGLYCERIN (NITROSTAT) 0.4 MG SL tablet Place 1 tablet under the tongue every 5 minutes as needed for Chest pain up to max of 3 total doses.  If no relief after 1 dose, call 911. 25 tablet 3    Nutritional Supplements (ENSURE) LIQD Take 1 Can by mouth 3 times daily as needed (nutrition) 90 Can 5    atorvastatin (LIPITOR) 80 MG tablet Take 1 tablet by mouth nightly 90 tablet 5    amLODIPine (NORVASC) 5 MG tablet Take 1 tablet by mouth 2 times daily 180 tablet 5    blood glucose test strips (TRUE METRIX BLOOD GLUCOSE TEST) strip 1 each by Does not apply route 2 times daily 100 each 5    clopidogrel (PLAVIX) 75 MG tablet TAKE 1 TABLET BY MOUTH DA JULIEN 90 tablet 5    albuterol (PROVENTIL) (2.5 MG/3ML) 0.083% nebulizer solution Take 3 mLs by nebulization every 4 hours as needed for Wheezing 120 each 5    budesonide-formoterol (SYMBICORT) 160-4.5 MCG/ACT AERO Inhale 2 puffs into the lungs daily 2 Inhaler 5    albuterol sulfate HFA (VENTOLIN HFA) 108 (90 Base) MCG/ACT inhaler Inhale 2 puffs into the lungs every 4 hours as needed for Wheezing or Shortness of Breath 3 Inhaler 5    ranolazine (RANEXA) 1000 MG extended release tablet Take 1 tablet by mouth 2 times daily 60 tablet 8     No facility-administered medications prior to visit. REVIEW OF SYSTEMS: .   Respiratory: Negative for shortness of breath. Cardiovascular: Negative for chest pain, palpitations  Gastrointestinal: Negative for blood in stool, abdominal distention, nausea, vomiting, abdominal pain, diarrhea,constipation. Neurological: Negative for speech difficulty, weakness and light-headedness, dizziness, tremors, sleepiness  Psychiatric/Behavioral: Negative for suicidal ideas, hallucinations, behavioral problems, self-injury, decreased concentration/cognition, agitation, confusion. PHYSICAL EXAM:   Nursing note and vitals reviewed. /78   Pulse 66   Wt 118 lb (53.5 kg)   Breastfeeding No   BMI 23.05 kg/m²   General Appearance: Patient is well nourished, well developed, well groomed and in no acute distress. Skin: Skin is warm and dry, good turgor . No rash or lesions noted. She is not diaphoretic. Pulmonary/Chest: Effort normal. No respiratory distress or use of accessory muscles. Auscultation revealing normal air entry. She does not have wheezes in the lung fields. She has no rales. Cardiovascular: Normal rate, regular rhythm, normal heart sounds, and does not have murmur. Exam reveals no gallop and no friction rub. Musculoskeletal / Extremities: Range of motion is normal. Gait is normal, assistive devices use: none. She exhibits edema: none, and no tenderness. Neurological/Psychiatric:She is alert and oriented to person, place, and time. Coordination is  normal.   Judgement and Insight is normal  Her mood is Appropriate and affect is Neutral/Euthymic(normal) .  Her behavior is normal.   thought content normal.        IMPRESSION:     1. Intractable migraine with aura with status migrainosus - STABLE: Continue current treatment plan    2. Chronic migraine  - STABLE: Continue current treatment plan    3. Chronic pain syndrome  - STABLE: Continue current treatment plan    4. Chronic painful diabetic neuropathy (HCC)  - STABLE: Continue current treatment plan    5. DDD (degenerative disc disease), cervical  - STABLE: Continue current treatment plan    6. Tendinitis of right rotator cuff  - STABLE: Continue current treatment plan    7. DDD (degenerative disc disease), lumbar  - STABLE: Continue current treatment plan    8. Fibromyalgia  - STABLE: Continue current treatment plan    9. Moderate episode of recurrent major depressive disorder (HCC)  - STABLE: Continue current treatment plan    10. Primary insomnia  - STABLE: Continue current treatment plan        PLAN:  Informed verbal consent was obtained.  -she was advised  to avoid using too many OTC analgesics to control the headaches, avoid chocolates, increased caffeine, cheeses and MSG nitrite containing foods, cigarette smoking.  To avoid bright lights, strong smells and skipping meals.   -Continue with Aimovig 140 mcg S/C monthly   -Continue with Cait Oliva, will try getting a PA  -she was advised proper sleep hygiene-told to avoid:use of caffeine or other stimulants after noon, alcohol use near bedtime, long or frequent naps during the day, erratic sleep schedule, heavy meals near bedtime, vigorous exercise near bedtime and use of electronic devices near bedtime   -Continue with Seroquel   -CBT techniques- relaxation therapies such as biofeedback, mindfulness based stress reduction, imagery, cognitive restructuring, problem solving discussed with patient   -Continue with Cymbalta 60 mg   -She was advised to increase fluids ( 5-7  glasses of fluid daily), limit caffeine, avoid cheese products, increase dietary fiber, increase activity and exercise as tolerated and relax regularly and enjoy meals   -Will check UDS next visit   -Continue with Zanaflex   -Monitor blood sugar regularly, diabetic control- adv diabetic diet. Goal for fasting blood sugars around 120. Follow up with Endocrinologist/PCP also for on going management    -Interm history reviewed    -[place] record was obtained and reviewed  for the last one year and no indicators of drug misuse  were found. Any other controlled substance prescriptions  seen on the record have been accounted for, I am aware of the patient receiving these medications. Pawan Valadez [place] record will be rechecked as part of office protocol. Ms. Skylar Lewis will be prescribed  the medications  listed below which are for treatment of her presenting  medical problems which for this visit include:   Diagnoses of Intractable migraine with aura with status migrainosus, Chronic migraine, Chronic pain syndrome, Chronic painful diabetic neuropathy ([address] DDD (degenerative disc disease), cervical, Tendinitis of right rotator cuff, DDD (degenerative disc disease), lumbar, Fibromyalgia, Moderate episode of recurrent major depressive disorder ([address] and Primary insomnia were pertinent to this visit.   Medications/orders associated with this visit:    Current Outpatient Medications   Medication Sig Dispense Refill    DULoxetine (CYMBALTA) 60 MG extended release capsule Take 1 capsule by mouth daily 30 capsule 1    QUEtiapine (SEROQUEL) 25 MG tablet Take 1 tablet by mouth nightly 60 tablet 3    tiZANidine (ZANAFLEX) 4 MG tablet Take 1/2-1 tablet po BID (Patient taking differently: Take 4 mg by mouth 2 times daily Take 1/2-1 tablet po BID) 60 tablet 0    Ubrogepant (UBRELVY) 50 MG TABS Talk 1 tablet as needed at start of headaches, can repeat in 24 hours 10 tablet 0    insulin aspart (NOVOLOG FLEXPEN) 100 UNIT/ML injection pen Inject 3 Units into the skin 3 times daily (before meals) 5 pen 2    insulin glargine (BASAGLAR KWIKPEN) 100 UNIT/ML injection pen Inject 8 Units into the skin 2 times daily 5 pen 3    ondansetron (ZOFRAN) 4 MG tablet Take 1 tablet by mouth every 8 hours as needed for Nausea or Vomiting 21 tablet 1    ULTICARE MINI PEN NEEDLES 31G X 6 MM MISC USE WITH INSULINS FOUR TIMES A  each 0    Erenumab-aooe 140 MG/ML SOAJ Inject 140 mLs into the skin every 30 days 1 pen 0    UNIFINE PENTIPS 31G X 8 MM MISC USE WITH insulin pens 100 each 5    alirocumab (PRALUENT) 75 MG/ML SOAJ injection pen Inject 1 mL into the skin every 14 days 6 pen 3    torsemide (DEMADEX) 10 MG tablet Take 10 mg by mouth daily      omeprazole (PRILOSEC) 20 MG delayed release capsule TAKE 1 CAPSULE BY MOUTH DAILY 30 capsule 1    metoprolol succinate (TOPROL XL) 50 MG extended release tablet TAKE 0.5 TABLETS BY MOUTH 2 TIMES DAILY (WITH MEALS) 30 tablet 5    aspirin 81 MG chewable tablet Take 1 tablet by mouth daily 30 tablet 3    dicyclomine (BENTYL) 20 MG tablet Take 20 mg by mouth 4 times daily (before meals and nightly)       nitroGLYCERIN (NITROSTAT) 0.4 MG SL tablet Place 1 tablet under the tongue every 5 minutes as needed for Chest pain up to max of 3 total doses.  If no relief after 1 dose, call 911. 25 tablet 3    Nutritional Supplements (ENSURE) LIQD Take 1 Can by mouth 3 times daily as needed (nutrition) 90 Can 5    atorvastatin (LIPITOR) 80 MG tablet Take 1 tablet by mouth nightly 90 tablet 5    amLODIPine (NORVASC) 5 MG tablet Take 1 tablet by mouth 2 times daily 180 tablet 5    blood glucose test strips (TRUE METRIX BLOOD GLUCOSE TEST) strip 1 each by Does not apply route 2 times daily 100 each 5    clopidogrel (PLAVIX) 75 MG tablet TAKE 1 TABLET BY MOUTH DA JULIEN 90 tablet 5    albuterol (PROVENTIL) (2.5 MG/3ML) 0.083% nebulizer solution Take 3 mLs by nebulization every 4 hours as needed for Wheezing 120 each 5    budesonide-formoterol (SYMBICORT) 160-4.5 MCG/ACT AERO Inhale 2 puffs into the lungs daily 2 Inhaler 5    albuterol sulfate HFA (VENTOLIN HFA) 108 (90 Base) MCG/ACT inhaler Inhale 2 puffs into the lungs every 4 hours as needed for Wheezing or Shortness of Breath 3 Inhaler 5    ranolazine (RANEXA) 1000 MG extended release tablet Take 1 tablet by mouth 2 times daily 60 tablet 8     No current facility-administered medications for this visit. Goals of current treatment regimen include improvement in pain, restoration of functioning- with focus on improvement in physical performance, general activity, work or disability,emotional distress, health care utilization and  decreased medication consumption. Will continue to monitor progress towards achieving/maintaining therapeutic goals with special emphasis on  1. Improvement in perceived interfernce  of pain with ADL's. Ability to do home exercises independently. Ability to do household chores indoor and/or outdoor work and social and leisure activities. To increase flexibility/ROM, strength and endurance. Improve psychosocial and physical functioning.- she is showing progression towards this treatment goal with the current regimen. 2. Improving sleep to 6-7 hours a night. Improve mood/ anxiety and depression symptoms such as crying spells, low energy, problems with concentration, motivation. - she is showing progression towards this treatment goal with the current regimen. 3. Reduction of reliance on opioid analgesia/more appropriate opioid use. - she is showing progression towards this treatment goal with the current regimen. Risks and benefits of the medications and other alternative treatments have been/were  discussed with the patient. Any questions on the  common side effects of these medications were also answered.   She was advised against drinking alcohol with the narcotic pain medicines, advised against driving or handling machinery when  starting or adjusting the dose of medicines, feeling groggy or drowsy, or if having any cognitive issues related to the current medications. Sheis fully aware of the risk of overdose and death, if medicines are misused and not taken as prescribed. If she develops new symptoms or if the symptoms worsen, she was told to call the office. .  Thank you for allowing me to participate in the care of this patient.     Juancho Delgado MD    Cc: Tenzin Rodriguez MD

## 2021-08-23 RX ORDER — OXYCODONE AND ACETAMINOPHEN 7.5; 325 MG/1; MG/1
1 TABLET ORAL EVERY 6 HOURS PRN
Qty: 84 TABLET | Refills: 0 | Status: SHIPPED | OUTPATIENT
Start: 2021-08-23 | End: 2021-09-21 | Stop reason: SDUPTHER

## 2021-08-31 ENCOUNTER — TELEPHONE (OUTPATIENT)
Dept: PAIN MANAGEMENT | Age: 66
End: 2021-08-31

## 2021-08-31 NOTE — TELEPHONE ENCOUNTER
Per message from 8/16 encounter; Dank Brewer was faxed manually in because it wasn't responding through [employer].

## 2021-09-03 NOTE — TELEPHONE ENCOUNTER
Per previous note I faxed a hard copy to the insurance company. So the pharmacy is checking CMM. I never got answer from [facility]; so I faxed manually. The are looking at . [facility].

## 2021-09-03 NOTE — TELEPHONE ENCOUNTER
Julieth Romano from [employer] called stating the PA department still needs to submit the questions.      Ref # is [id number]    Please call [phone] for questions

## 2021-09-09 ENCOUNTER — TELEPHONE (OUTPATIENT)
Dept: PAIN MANAGEMENT | Age: 66
End: 2021-09-09

## 2021-09-09 NOTE — TELEPHONE ENCOUNTER
[employer] need sent to cover my meds. See below reference;           Ref#[id number]      Ubrogepant (UBRELVY) 50 MG TABS [  Please advise

## 2021-09-13 NOTE — TELEPHONE ENCOUNTER
Submitted PA for UBRELVY Via [facility] Key: [id number] STATUS: \"Approved, Coverage Starts on: 8/1/2021 12:00:00 AM, Coverage Ends on: 9/13/2022 \"    The patient was notified by phone.

## 2021-09-14 DIAGNOSIS — G43.111 INTRACTABLE MIGRAINE WITH AURA WITH STATUS MIGRAINOSUS: ICD-10-CM

## 2021-09-14 DIAGNOSIS — G89.4 CHRONIC PAIN SYNDROME: ICD-10-CM

## 2021-09-15 RX ORDER — UBROGEPANT 50 MG/1
TABLET ORAL
Qty: 10 TABLET | Refills: 0 | Status: SHIPPED | OUTPATIENT
Start: 2021-09-15 | End: 2021-09-21 | Stop reason: SDUPTHER

## 2021-09-16 ENCOUNTER — PATIENT MESSAGE (OUTPATIENT)
Dept: CARDIOLOGY CLINIC | Age: 66
End: 2021-09-16

## 2021-09-21 ENCOUNTER — OFFICE VISIT (OUTPATIENT)
Dept: PAIN MANAGEMENT | Age: 66
End: 2021-09-21
Payer: MEDICARE

## 2021-09-21 VITALS
SYSTOLIC BLOOD PRESSURE: 138 MMHG | DIASTOLIC BLOOD PRESSURE: 73 MMHG | HEART RATE: 73 BPM | BODY MASS INDEX: 23.16 KG/M2 | WEIGHT: 118.6 LBS | TEMPERATURE: 97.6 F | OXYGEN SATURATION: 100 %

## 2021-09-21 DIAGNOSIS — G89.4 CHRONIC PAIN SYNDROME: ICD-10-CM

## 2021-09-21 DIAGNOSIS — M51.36 DDD (DEGENERATIVE DISC DISEASE), LUMBAR: ICD-10-CM

## 2021-09-21 DIAGNOSIS — M79.7 FIBROMYALGIA: ICD-10-CM

## 2021-09-21 DIAGNOSIS — M50.30 DDD (DEGENERATIVE DISC DISEASE), CERVICAL: ICD-10-CM

## 2021-09-21 DIAGNOSIS — M75.81 TENDINITIS OF RIGHT ROTATOR CUFF: ICD-10-CM

## 2021-09-21 DIAGNOSIS — E11.40 CHRONIC PAINFUL DIABETIC NEUROPATHY (HCC): ICD-10-CM

## 2021-09-21 PROCEDURE — 99212 OFFICE O/P EST SF 10 MIN: CPT | Performed by: INTERNAL MEDICINE

## 2021-09-21 PROCEDURE — G8427 DOCREV CUR MEDS BY ELIG CLIN: HCPCS | Performed by: INTERNAL MEDICINE

## 2021-09-21 PROCEDURE — 2022F DILAT RTA XM EVC RTNOPTHY: CPT | Performed by: INTERNAL MEDICINE

## 2021-09-21 PROCEDURE — G8420 CALC BMI NORM PARAMETERS: HCPCS | Performed by: INTERNAL MEDICINE

## 2021-09-21 RX ORDER — UBROGEPANT 50 MG/1
TABLET ORAL
Qty: 10 TABLET | Refills: 0 | Status: SHIPPED | OUTPATIENT
Start: 2021-09-21 | End: 2021-11-02 | Stop reason: SDUPTHER

## 2021-09-21 RX ORDER — QUETIAPINE FUMARATE 25 MG/1
25 TABLET, FILM COATED ORAL NIGHTLY
Qty: 60 TABLET | Refills: 1 | Status: SHIPPED | OUTPATIENT
Start: 2021-09-21 | End: 2021-11-02 | Stop reason: SDUPTHER

## 2021-09-21 RX ORDER — DULOXETIN HYDROCHLORIDE 60 MG/1
60 CAPSULE, DELAYED RELEASE ORAL DAILY
Qty: 30 CAPSULE | Refills: 1 | Status: SHIPPED | OUTPATIENT
Start: 2021-09-21 | End: 2021-11-02 | Stop reason: SDUPTHER

## 2021-09-21 RX ORDER — TIZANIDINE 4 MG/1
2-4 TABLET ORAL 2 TIMES DAILY PRN
Qty: 60 TABLET | Refills: 0 | Status: SHIPPED | OUTPATIENT
Start: 2021-09-21 | End: 2021-11-02 | Stop reason: SDUPTHER

## 2021-09-21 RX ORDER — OXYCODONE AND ACETAMINOPHEN 7.5; 325 MG/1; MG/1
1 TABLET ORAL EVERY 6 HOURS PRN
Qty: 84 TABLET | Refills: 0 | Status: ON HOLD | OUTPATIENT
Start: 2021-09-21 | End: 2021-10-23 | Stop reason: HOSPADM

## 2021-09-21 NOTE — PROGRESS NOTES
QUEtiapine (SEROQUEL) 25 MG tablet Take 1 tablet by mouth nightly 60 tablet 1    tiZANidine (ZANAFLEX) 4 MG tablet Take 0.5-1 tablets by mouth 2 times daily as needed (muscle spasms) 60 tablet 0    Erenumab-aooe 140 MG/ML SOAJ Inject 140 mLs into the skin every 30 days 1 pen 1    insulin aspart (NOVOLOG FLEXPEN) 100 UNIT/ML injection pen Inject 3 Units into the skin 3 times daily (before meals) 5 pen 2    insulin glargine (BASAGLAR KWIKPEN) 100 UNIT/ML injection pen Inject 8 Units into the skin 2 times daily 5 pen 3    ondansetron (ZOFRAN) 4 MG tablet Take 1 tablet by mouth every 8 hours as needed for Nausea or Vomiting 21 tablet 1    ULTICARE MINI PEN NEEDLES 31G X 6 MM MISC USE WITH INSULINS FOUR TIMES A  each 0    UNIFINE PENTIPS 31G X 8 MM MISC USE WITH insulin pens 100 each 5    alirocumab (PRALUENT) 75 MG/ML SOAJ injection pen Inject 1 mL into the skin every 14 days 6 pen 3    torsemide (DEMADEX) 10 MG tablet Take 10 mg by mouth daily      omeprazole (PRILOSEC) 20 MG delayed release capsule TAKE 1 CAPSULE BY MOUTH DAILY 30 capsule 1    metoprolol succinate (TOPROL XL) 50 MG extended release tablet TAKE 0.5 TABLETS BY MOUTH 2 TIMES DAILY (WITH MEALS) 30 tablet 5    aspirin 81 MG chewable tablet Take 1 tablet by mouth daily 30 tablet 3    dicyclomine (BENTYL) 20 MG tablet Take 20 mg by mouth 4 times daily (before meals and nightly)       nitroGLYCERIN (NITROSTAT) 0.4 MG SL tablet Place 1 tablet under the tongue every 5 minutes as needed for Chest pain up to max of 3 total doses.  If no relief after 1 dose, call 911. 25 tablet 3    Nutritional Supplements (ENSURE) LIQD Take 1 Can by mouth 3 times daily as needed (nutrition) 90 Can 5    atorvastatin (LIPITOR) 80 MG tablet Take 1 tablet by mouth nightly 90 tablet 5    amLODIPine (NORVASC) 5 MG tablet Take 1 tablet by mouth 2 times daily 180 tablet 5    blood glucose test strips (TRUE METRIX BLOOD GLUCOSE TEST) strip 1 each by Does not apply route 2 times daily 100 each 5    clopidogrel (PLAVIX) 75 MG tablet TAKE 1 TABLET BY MOUTH DA JULIEN 90 tablet 5    albuterol (PROVENTIL) (2.5 MG/3ML) 0.083% nebulizer solution Take 3 mLs by nebulization every 4 hours as needed for Wheezing 120 each 5    budesonide-formoterol (SYMBICORT) 160-4.5 MCG/ACT AERO Inhale 2 puffs into the lungs daily 2 Inhaler 5    albuterol sulfate HFA (VENTOLIN HFA) 108 (90 Base) MCG/ACT inhaler Inhale 2 puffs into the lungs every 4 hours as needed for Wheezing or Shortness of Breath 3 Inhaler 5    ranolazine (RANEXA) 1000 MG extended release tablet Take 1 tablet by mouth 2 times daily 60 tablet 8     No facility-administered medications prior to visit. REVIEW OF SYSTEMS:    Respiratory: Negative for apnea, chest tightness and shortness of breath or change in baseline breathing. PHYSICAL EXAM:   Nursing note and vitals reviewed. /73   Pulse 73   Temp 97.6 °F (36.4 °C) (Temporal)   Wt 118 lb 9.6 oz (53.8 kg)   SpO2 100%   BMI 23.16 kg/m²   Constitutional: She appears well-developed and well-nourished. No acute distress. Cardiovascular: Normal rate, regular rhythm, normal heart sounds, and does not have murmur. Pulmonary/Chest: Effort normal. No respiratory distress. She does not have wheezes in the lung fields. She has no rales. Neurological/Psychiatric:She is alert and oriented to person, place, and time. Coordination is  normal.  Her mood isAppropriate and affect is Neutral/Euthymic(normal)   IMPRESSION:   1. Chronic pain syndrome    2. Chronic painful diabetic neuropathy ([facility] [address]    3. DDD (degenerative disc disease), cervical    4. Tendinitis of right rotator cuff    5. DDD (degenerative disc disease), lumbar    6.  Fibromyalgia        PLAN:  Informed verbal consent was obtained  -Urine drug screen with GC/MS for opiates and drugs of abuse was ordered and will follow up on results.   -Interim history reviewed  -Continue with Percocet 3 per day   -She was advised to increase fluids ( 5-7  glasses of fluid daily), limit caffeine, avoid cheese products, increase dietary fiber, increase activity and exercise as tolerated and relax regularly and enjoy meals   -Monitor blood sugar regularly, diabetic control- adv diabetic diet. Goal for fasting blood sugars around 120.  Follow up with Endocrinologist/PCP also for on going management     Current Outpatient Medications   Medication Sig Dispense Refill    Ubrogepant (UBRELVY) 50 MG TABS TAKE 1 TABLET BY MOUTH AS NEEDED AT START OF HEADACHES, CAN REPEAT IN 24 HOURS 10 tablet 0    DULoxetine (CYMBALTA) 60 MG extended release capsule Take 1 capsule by mouth daily 30 capsule 1    QUEtiapine (SEROQUEL) 25 MG tablet Take 1 tablet by mouth nightly 60 tablet 1    tiZANidine (ZANAFLEX) 4 MG tablet Take 0.5-1 tablets by mouth 2 times daily as needed (muscle spasms) 60 tablet 0    Erenumab-aooe 140 MG/ML SOAJ Inject 140 mLs into the skin every 30 days 1 pen 1    insulin aspart (NOVOLOG FLEXPEN) 100 UNIT/ML injection pen Inject 3 Units into the skin 3 times daily (before meals) 5 pen 2    insulin glargine (BASAGLAR KWIKPEN) 100 UNIT/ML injection pen Inject 8 Units into the skin 2 times daily 5 pen 3    ondansetron (ZOFRAN) 4 MG tablet Take 1 tablet by mouth every 8 hours as needed for Nausea or Vomiting 21 tablet 1    ULTICARE MINI PEN NEEDLES 31G X 6 MM MISC USE WITH INSULINS FOUR TIMES A  each 0    UNIFINE PENTIPS 31G X 8 MM MISC USE WITH insulin pens 100 each 5    alirocumab (PRALUENT) 75 MG/ML SOAJ injection pen Inject 1 mL into the skin every 14 days 6 pen 3    torsemide (DEMADEX) 10 MG tablet Take 10 mg by mouth daily      omeprazole (PRILOSEC) 20 MG delayed release capsule TAKE 1 CAPSULE BY MOUTH DAILY 30 capsule 1    metoprolol succinate (TOPROL XL) 50 MG extended release tablet TAKE 0.5 TABLETS BY MOUTH 2 TIMES DAILY (WITH MEALS) 30 tablet 5    aspirin 81 MG chewable tablet Take 1 tablet by mouth daily 30 tablet 3    dicyclomine (BENTYL) 20 MG tablet Take 20 mg by mouth 4 times daily (before meals and nightly)       nitroGLYCERIN (NITROSTAT) 0.4 MG SL tablet Place 1 tablet under the tongue every 5 minutes as needed for Chest pain up to max of 3 total doses. If no relief after 1 dose, call 911. 25 tablet 3    Nutritional Supplements (ENSURE) LIQD Take 1 Can by mouth 3 times daily as needed (nutrition) 90 Can 5    atorvastatin (LIPITOR) 80 MG tablet Take 1 tablet by mouth nightly 90 tablet 5    amLODIPine (NORVASC) 5 MG tablet Take 1 tablet by mouth 2 times daily 180 tablet 5    blood glucose test strips (TRUE METRIX BLOOD GLUCOSE TEST) strip 1 each by Does not apply route 2 times daily 100 each 5    clopidogrel (PLAVIX) 75 MG tablet TAKE 1 TABLET BY MOUTH DA JULIEN 90 tablet 5    albuterol (PROVENTIL) (2.5 MG/3ML) 0.083% nebulizer solution Take 3 mLs by nebulization every 4 hours as needed for Wheezing 120 each 5    budesonide-formoterol (SYMBICORT) 160-4.5 MCG/ACT AERO Inhale 2 puffs into the lungs daily 2 Inhaler 5    albuterol sulfate HFA (VENTOLIN HFA) 108 (90 Base) MCG/ACT inhaler Inhale 2 puffs into the lungs every 4 hours as needed for Wheezing or Shortness of Breath 3 Inhaler 5    ranolazine (RANEXA) 1000 MG extended release tablet Take 1 tablet by mouth 2 times daily 60 tablet 8     No current facility-administered medications for this visit. I will continue her current medication regimen  which is part of the above treatment schedule. It has been helping with Ms. Meza's chronic  medical problems which for this visit include:   Diagnoses of Chronic pain syndrome, Chronic migraine, Intractable migraine with aura with status migrainosus, Chronic painful diabetic neuropathy ([address] DDD (degenerative disc disease), cervical, Tendinitis of right rotator cuff, DDD (degenerative disc disease), lumbar, and Fibromyalgia were pertinent to this visit.    Risks and benefits of the medications and other alternative treatments  including no treatment were discussed with the patient. The common side effects of these medications were also explained to the patient. Informed verbal consent was obtained. Goals of current treatment regimen include improvement in pain, restoration of functioning- with focus on improvement in physical performance, general activity, work or disability,emotional distress, health care utilization and  decreased medication consumption. Will continue to monitor progress towards achieving/maintaining therapeutic goals with special emphasis on  1. Improvement in perceived interfernce  of pain with ADL's. Ability to do home exercises independently. Ability to do household chores indoor and/or outdoor work and social and leisure activities. Improve psychosocial and physical functioning. - she is showing progression towards this treatment goal with the current regimen. She was advised against drinking alcohol with the narcotic pain medicines, advised against driving or handling machinery while adjusting the dose of medicines or if having cognitive  issues related to the current medications. Risk of overdose and death, if medicines not taken as prescribed, were also discussed. If the patient develops new symptoms or if the symptoms worsen, the patient should call the office. While transcribing every attempt was made to maintain the accuracy of the note in terms of it's contents,there may have been some errors made inadvertently. Thank you for allowing me to participate in the care of this patient.     Oralia Weiss MD.    Cc: Cash Rosas MD

## 2021-09-27 DIAGNOSIS — I25.10 CORONARY ARTERY DISEASE INVOLVING NATIVE CORONARY ARTERY OF NATIVE HEART WITHOUT ANGINA PECTORIS: ICD-10-CM

## 2021-09-27 DIAGNOSIS — I10 ESSENTIAL HYPERTENSION: ICD-10-CM

## 2021-09-28 RX ORDER — METOPROLOL SUCCINATE 50 MG/1
25 TABLET, EXTENDED RELEASE ORAL 2 TIMES DAILY WITH MEALS
Qty: 30 TABLET | Refills: 5 | Status: ON HOLD | OUTPATIENT
Start: 2021-09-28 | End: 2021-10-23 | Stop reason: HOSPADM

## 2021-09-28 NOTE — TELEPHONE ENCOUNTER
Medication:   Requested Prescriptions     Pending Prescriptions Disp Refills    metoprolol succinate (TOPROL XL) 50 MG extended release tablet [Pharmacy Med Name: METOPROLOL SUCCINATE ER 50 50 Tablet] 30 tablet 5     Sig: TAKE 0.5 TABLETS BY MOUTH 2 TIMES DAILY (WITH MEALS)        Last Filled:      Patient Phone Number: [phone] (home)     Last appt: 3/1/2021   Next appt: Visit date not found    Last OARRS:   RX Monitoring 4/9/2019   Attestation The Prescription Monitoring Report for this patient was reviewed today.

## 2021-10-13 RX ORDER — METOPROLOL SUCCINATE 50 MG/1
50 TABLET, EXTENDED RELEASE ORAL NIGHTLY
COMMUNITY
End: 2022-01-27 | Stop reason: SDUPTHER

## 2021-10-14 ENCOUNTER — ANESTHESIA EVENT (OUTPATIENT)
Dept: ENDOSCOPY | Age: 66
DRG: 392 | End: 2021-10-14
Payer: MEDICARE

## 2021-10-15 ENCOUNTER — HOSPITAL ENCOUNTER (OUTPATIENT)
Age: 66
Setting detail: OUTPATIENT SURGERY
Discharge: HOME | DRG: 392 | End: 2021-10-15
Attending: INTERNAL MEDICINE | Admitting: INTERNAL MEDICINE
Payer: MEDICARE

## 2021-10-15 ENCOUNTER — ANESTHESIA (OUTPATIENT)
Dept: ENDOSCOPY | Age: 66
DRG: 392 | End: 2021-10-15
Payer: MEDICARE

## 2021-10-15 VITALS
OXYGEN SATURATION: 77 % | RESPIRATION RATE: 16 BRPM | DIASTOLIC BLOOD PRESSURE: 63 MMHG | SYSTOLIC BLOOD PRESSURE: 142 MMHG

## 2021-10-15 VITALS
WEIGHT: 111.77 LBS | HEART RATE: 58 BPM | BODY MASS INDEX: 21.94 KG/M2 | HEIGHT: 60 IN | SYSTOLIC BLOOD PRESSURE: 189 MMHG | TEMPERATURE: 97 F | DIASTOLIC BLOOD PRESSURE: 73 MMHG | RESPIRATION RATE: 18 BRPM | OXYGEN SATURATION: 100 %

## 2021-10-15 LAB
GLUCOSE BLD-MCNC: 128 MG/DL (ref 70–99)
GLUCOSE BLD-MCNC: 203 MG/DL (ref 70–99)
PERFORMED ON: ABNORMAL
PERFORMED ON: ABNORMAL

## 2021-10-15 PROCEDURE — 3700000001 HC ADD 15 MINUTES (ANESTHESIA): Performed by: INTERNAL MEDICINE

## 2021-10-15 PROCEDURE — 2500000003 HC RX 250 WO HCPCS: Performed by: NURSE ANESTHETIST, CERTIFIED REGISTERED

## 2021-10-15 PROCEDURE — 7100000011 HC PHASE II RECOVERY - ADDTL 15 MIN: Performed by: INTERNAL MEDICINE

## 2021-10-15 PROCEDURE — 3700000000 HC ANESTHESIA ATTENDED CARE: Performed by: INTERNAL MEDICINE

## 2021-10-15 PROCEDURE — 0DJD8ZZ INSPECTION OF LOWER INTESTINAL TRACT, VIA NATURAL OR ARTIFICIAL OPENING ENDOSCOPIC: ICD-10-PCS | Performed by: INTERNAL MEDICINE

## 2021-10-15 PROCEDURE — 7100000001 HC PACU RECOVERY - ADDTL 15 MIN: Performed by: INTERNAL MEDICINE

## 2021-10-15 PROCEDURE — 2709999900 HC NON-CHARGEABLE SUPPLY: Performed by: INTERNAL MEDICINE

## 2021-10-15 PROCEDURE — 7100000010 HC PHASE II RECOVERY - FIRST 15 MIN: Performed by: INTERNAL MEDICINE

## 2021-10-15 PROCEDURE — 3609027000 HC COLONOSCOPY: Performed by: INTERNAL MEDICINE

## 2021-10-15 PROCEDURE — 6360000002 HC RX W HCPCS: Performed by: NURSE ANESTHETIST, CERTIFIED REGISTERED

## 2021-10-15 PROCEDURE — 7100000000 HC PACU RECOVERY - FIRST 15 MIN: Performed by: INTERNAL MEDICINE

## 2021-10-15 RX ORDER — SODIUM CHLORIDE 9 MG/ML
25 INJECTION, SOLUTION INTRAVENOUS PRN
Status: DISCONTINUED | OUTPATIENT
Start: 2021-10-15 | End: 2021-10-15 | Stop reason: HOSPADM

## 2021-10-15 RX ORDER — SODIUM CHLORIDE 0.9 % (FLUSH) 0.9 %
10 SYRINGE (ML) INJECTION PRN
Status: DISCONTINUED | OUTPATIENT
Start: 2021-10-15 | End: 2021-10-15 | Stop reason: HOSPADM

## 2021-10-15 RX ORDER — SODIUM CHLORIDE 0.9 % (FLUSH) 0.9 %
10 SYRINGE (ML) INJECTION EVERY 12 HOURS SCHEDULED
Status: DISCONTINUED | OUTPATIENT
Start: 2021-10-15 | End: 2021-10-15 | Stop reason: HOSPADM

## 2021-10-15 RX ORDER — LIDOCAINE HYDROCHLORIDE 10 MG/ML
5 INJECTION, SOLUTION EPIDURAL; INFILTRATION; INTRACAUDAL; PERINEURAL ONCE
Status: DISCONTINUED | OUTPATIENT
Start: 2021-10-15 | End: 2021-10-15 | Stop reason: HOSPADM

## 2021-10-15 RX ORDER — LIDOCAINE HYDROCHLORIDE 20 MG/ML
INJECTION, SOLUTION EPIDURAL; INFILTRATION; INTRACAUDAL; PERINEURAL PRN
Status: DISCONTINUED | OUTPATIENT
Start: 2021-10-15 | End: 2021-10-15 | Stop reason: SDUPTHER

## 2021-10-15 RX ORDER — SODIUM CHLORIDE 0.9 % (FLUSH) 0.9 %
5-40 SYRINGE (ML) INJECTION EVERY 12 HOURS SCHEDULED
Status: DISCONTINUED | OUTPATIENT
Start: 2021-10-15 | End: 2021-10-15 | Stop reason: HOSPADM

## 2021-10-15 RX ORDER — SODIUM CHLORIDE 9 MG/ML
INJECTION, SOLUTION INTRAVENOUS CONTINUOUS
Status: DISCONTINUED | OUTPATIENT
Start: 2021-10-15 | End: 2021-10-15 | Stop reason: HOSPADM

## 2021-10-15 RX ORDER — PROPOFOL 10 MG/ML
INJECTION, EMULSION INTRAVENOUS CONTINUOUS PRN
Status: DISCONTINUED | OUTPATIENT
Start: 2021-10-15 | End: 2021-10-15 | Stop reason: SDUPTHER

## 2021-10-15 RX ORDER — SODIUM CHLORIDE 0.9 % (FLUSH) 0.9 %
5-40 SYRINGE (ML) INJECTION PRN
Status: DISCONTINUED | OUTPATIENT
Start: 2021-10-15 | End: 2021-10-15 | Stop reason: HOSPADM

## 2021-10-15 RX ADMIN — LIDOCAINE HYDROCHLORIDE 60 MG: 20 INJECTION, SOLUTION EPIDURAL; INFILTRATION; INTRACAUDAL; PERINEURAL at 11:02

## 2021-10-15 RX ADMIN — PROPOFOL 120 MCG/KG/MIN: 10 INJECTION, EMULSION INTRAVENOUS at 11:02

## 2021-10-15 ASSESSMENT — LIFESTYLE VARIABLES: SMOKING_STATUS: 0

## 2021-10-15 ASSESSMENT — PULMONARY FUNCTION TESTS
PIF_VALUE: 1

## 2021-10-15 ASSESSMENT — PAIN DESCRIPTION - PAIN TYPE
TYPE: ACUTE PAIN
TYPE: ACUTE PAIN

## 2021-10-15 ASSESSMENT — PAIN SCALES - GENERAL
PAINLEVEL_OUTOF10: 0

## 2021-10-15 ASSESSMENT — PAIN - FUNCTIONAL ASSESSMENT: PAIN_FUNCTIONAL_ASSESSMENT: 0-10

## 2021-10-15 ASSESSMENT — ENCOUNTER SYMPTOMS: SHORTNESS OF BREATH: 1

## 2021-10-15 ASSESSMENT — COPD QUESTIONNAIRES: CAT_SEVERITY: MODERATE

## 2021-10-15 NOTE — ANESTHESIA POSTPROCEDURE EVALUATION
Department of Anesthesiology  Postprocedure Note    Patient: Avni Alston  MRN: [id number]  YOB: 1955  Date of evaluation: 10/15/2021  Time:  12:58 PM     Procedure Summary     Date: 10/15/21 Room / Location: [address] [place] [address]    Anesthesia Start: 5240 Anesthesia Stop: 3174    Procedure: COLONOSCOPY (N/A ) Diagnosis:       Diarrhea, unspecified type      (DIARRHEA)    Surgeons: Lynnann Spurling, MD Responsible Provider: Yvette Roldan MD    Anesthesia Type: MAC ASA Status: 4          Anesthesia Type: MAC    Tessa Phase I: Tessa Score: 10    Tessa Phase II: Tessa Score: 10    Last vitals: Reviewed and per EMR flowsheets.        Anesthesia Post Evaluation    Patient location during evaluation: PACU  Level of consciousness: awake and alert  Airway patency: patent  Nausea & Vomiting: no nausea and no vomiting  Complications: no  Cardiovascular status: blood pressure returned to baseline  Respiratory status: acceptable  Hydration status: euvolemic  Comments: Postoperative Anesthesia Note    Name:    Avni Alston  MRN:      [id number]    Patient Vitals in the past 12 hrs:  10/15/21 1248, BP:(!) 159/77, Temp:97 °F (36.1 °C), Temp src:Temporal, Pulse:58, Resp:18, SpO2:100 %  10/15/21 1242, BP:108/63, Pulse:76, Resp:18  10/15/21 1203, BP:(!) 116/57, Pulse:60, Resp:16, SpO2:98 %  10/15/21 1200, BP:127/66, Pulse:57, Resp:14, SpO2:100 %  10/15/21 1153, BP:119/60, Pulse:60, Resp:17  10/15/21 1148, BP:(!) 120/53, Pulse:62, Resp:13  10/15/21 1145, BP:(!) 114/56, Pulse:57, Resp:18  10/15/21 1136, BP:(!) 118/51, Pulse:62, Resp:15  10/15/21 1133, BP:(!) 118/51, Temp:97 °F (36.1 °C), Pulse:61, Resp:15, SpO2:99 %  10/15/21 0735, BP:132/72, Temp:97.2 °F (36.2 °C), Temp src:Temporal, Pulse:73, Resp:15, SpO2:99 %, Height:5' (1.524 m), Weight:111 lb 12.4 oz (50.7 kg)     LABS:    CBC  Lab Results       Component                Value               Date/Time                  WBC 4.9                 07/30/2021 11:45 AM        HGB                      8.9 (L)             07/30/2021 11:45 AM        HCT                      26.6 (L)            07/30/2021 11:45 AM        PLT                      218                 07/30/2021 11:45 AM   RENAL  Lab Results       Component                Value               Date/Time                  NA                       137                 07/30/2021 11:45 AM        K                        5.1                 07/30/2021 11:45 AM        K                        4.9                 06/19/2021 06:31 PM        CL                       104                 07/30/2021 11:45 AM        CO2                      24                  07/30/2021 11:45 AM        BUN                      27 (H)              07/30/2021 11:45 AM        CREATININE               1.2                 07/30/2021 11:45 AM        GLUCOSE                  117 (H)             07/30/2021 11:45 AM   COAGS  Lab Results       Component                Value               Date/Time                  PROTIME                  12.0                06/19/2021 06:31 PM        PROTIME                  10.3                12/15/2009 01:34 AM        INR                      1.03                06/19/2021 06:31 PM        APTT                     52.4 (H)            06/21/2021 08:32 AM     Intake & Output:  @29KMDA@    Nausea & Vomiting:  No    Level of Consciousness:  Awake    Pain Assessment:  Adequate analgesia    Anesthesia Complications:  No apparent anesthetic complications    SUMMARY      Vital signs stable  OK to discharge from Stage I post anesthesia care.   Care transferred from Anesthesiology department on discharge from perioperative area

## 2021-10-15 NOTE — H&P
Pre-operative History and Physical    Patient: Maria Esther Cotton  : 1955  Acct#:     HISTORY OF PRESENT ILLNESS:    The patient is a 60 y.o. female who presents with colon cancer screening. Past Medical History:        Diagnosis Date    Acid reflux     Anemia     Anxiety and depression     Arthritis     Asthma     Atrial fibrillation (HCC)     CAD (coronary artery disease) 12/3/2012    Cerebral artery occlusion with cerebral infarction [facility])     TIA\"\"S--right sided weakness & headache    CHF (congestive heart failure) (HCC)     Chronic kidney disease--stage III     40% kidney function    COPD (chronic obstructive pulmonary disease) ([facility] [address] [address]     DM2 (diabetes mellitus, type 2) ([facility] [address] [address]     Dysarthria     Fibromyalgia 2016    Headache(784.0) 2013    Hemisensory loss     History of blood transfusion 2020    pt denies having transfusion reaction    Hyperlipidemia     Hypertension     IBS (irritable bowel syndrome)     Inferior vena cava occlusion (HCC)     Keratitis     MI, old     Neuropathy     Superior vena cava obstruction     Temporal arteritis ([facility] [address] 2014    Wears glasses       Past Surgical History:        Procedure Laterality Date    ABLATION OF DYSRHYTHMIC FOCUS    and 2020    times 2    ARTERY BIOPSY Right 2014    RIGHT TEMPORAL ARTERY BIOPSY    CAROTID ARTERY SURGERY Left     clean up per pt    CATARACT REMOVAL Bilateral     CHOLECYSTECTOMY      COLONOSCOPY N/A 2021    COLONOSCOPY WITH BIOPSY performed by [facility]      HYSTERECTOMY      JOINT REPLACEMENT Right     KNEE ARTHROSCOPY Right     PTCA  10/2019    LAD and RCA inrtervention    TUNNELED VENOUS PORT PLACEMENT      left thigh.   SMART PORT-----Removed--total of 4 port placement and removal    UPPER GASTROINTESTINAL ENDOSCOPY N/A 2020    EGD DIAGNOSTIC ONLY performed by Tessa Goncalves MD at [facility] ENDOSCOPY      Medications Prior to Admission:   No current facility-administered medications on file prior to encounter. Current Outpatient Medications on File Prior to Encounter   Medication Sig Dispense Refill    metoprolol succinate (TOPROL XL) 50 MG extended release tablet Take 50 mg by mouth nightly      UNIFINE PENTIPS 31G X 8 MM MISC USE WITH insulin pens 100 each 5    alirocumab (PRALUENT) 75 MG/ML SOAJ injection pen Inject 1 mL into the skin every 14 days 6 pen 3    torsemide (DEMADEX) 10 MG tablet Take 10 mg by mouth daily      omeprazole (PRILOSEC) 20 MG delayed release capsule TAKE 1 CAPSULE BY MOUTH DAILY 30 capsule 1    aspirin 81 MG chewable tablet Take 1 tablet by mouth daily 30 tablet 3    dicyclomine (BENTYL) 20 MG tablet Take 20 mg by mouth 4 times daily (before meals and nightly)       nitroGLYCERIN (NITROSTAT) 0.4 MG SL tablet Place 1 tablet under the tongue every 5 minutes as needed for Chest pain up to max of 3 total doses.  If no relief after 1 dose, call 911. 25 tablet 3    Nutritional Supplements (ENSURE) LIQD Take 1 Can by mouth 3 times daily as needed (nutrition) 90 Can 5    atorvastatin (LIPITOR) 80 MG tablet Take 1 tablet by mouth nightly 90 tablet 5    amLODIPine (NORVASC) 5 MG tablet Take 1 tablet by mouth 2 times daily 180 tablet 5    blood glucose test strips (TRUE METRIX BLOOD GLUCOSE TEST) strip 1 each by Does not apply route 2 times daily 100 each 5    clopidogrel (PLAVIX) 75 MG tablet TAKE 1 TABLET BY MOUTH DA JULIEN 90 tablet 5    albuterol (PROVENTIL) (2.5 MG/3ML) 0.083% nebulizer solution Take 3 mLs by nebulization every 4 hours as needed for Wheezing 120 each 5    budesonide-formoterol (SYMBICORT) 160-4.5 MCG/ACT AERO Inhale 2 puffs into the lungs daily 2 Inhaler 5    albuterol sulfate HFA (VENTOLIN HFA) 108 (90 Base) MCG/ACT inhaler Inhale 2 puffs into the lungs every 4 hours as needed for Wheezing or Shortness of Breath 3 Inhaler 5    ranolazine (RANEXA) 1000 MG extended release tablet Take 1 tablet by mouth 2 times daily 60 tablet 8        Allergies:  Patient has no known allergies. Social History:   Social History     Socioeconomic History    Marital status:      Spouse name: Not on file    Number of children: 6    Years of education: Not on file    Highest education level: Not on file   Occupational History    Not on file   Tobacco Use    Smoking status: Former Smoker     Packs/day: 0.50     Years: 35.00     Pack years: 17.50     Types: Cigarettes     Quit date: 7/1/2018     Years since quitting: 3.2    Smokeless tobacco: Never Used    Tobacco comment: 5/13/15 has not smoked since hospitalization - kh   Vaping Use    Vaping Use: Never used   Substance and Sexual Activity    Alcohol use: No     Alcohol/week: 0.0 standard drinks    Drug use: Never    Sexual activity: Not Currently     Partners: Male   Other Topics Concern    Not on file   Social History Narrative    Not on file     Social Determinants of Health     Financial Resource Strain:     Difficulty of Paying Living Expenses:    Food Insecurity:     Worried About Running Out of Food in the Last Year:     [address] in the Last Year:    Transportation Needs:     Lack of Transportation (Medical):      Lack of Transportation (Non-Medical):    Physical Activity:     Days of Exercise per Week:     Minutes of Exercise per Session:    Stress:     Feeling of Stress :    Social Connections:     Frequency of Communication with Friends and Family:     Frequency of Social Gatherings with Friends and Family:     Attends L[employer] Services:     Active Member of Clubs or Organizations:     Attends Club or Organization Meetings:     Marital Status:    Intimate Partner Violence:     Fear of Current or Ex-Partner:     Emotionally Abused:     Physically Abused:     Sexually Abused:       Family History:       Problem Relation Age of Onset    Diabetes Mother     High Cholesterol Mother     Stroke Mother     Cancer Mother     High Blood Pressure Mother     No Known Problems Paternal Grandfather         lung issues         PHYSICAL EXAM:      /72   Pulse 73   Temp 97.2 °F (36.2 °C) (Temporal)   Resp 15   Ht 5' (1.524 m)   Wt 111 lb 12.4 oz (50.7 kg)   SpO2 99%   BMI 21.83 kg/m²  I        Heart:  RRR    Lungs:  CTA b    Abdomen:  S/NT/ND/+BS      ASSESSMENT AND PLAN:  ASA: per anesthesia  Mallampati: per anesthesia  1. Patient is a 60 y.o. female here for colonoscopy   2. Procedure options, risks and benefits reviewed with the patient. The patient expresses understanding.     Cole Mendenhall

## 2021-10-15 NOTE — PROGRESS NOTES
Arrived to place Midline in patient with Shefali Warren RN at bedside. Pre procedure and allergies reviewed, no issues accessing brachial vein. pt tolerated well, blood return and flushed well. Pt left in stable condition and bed braked and in lowest position. Pt call light within reach.
C-Difficile admission screening and protocol:     * Admitted with diarrhea? YES____    NO__x___     *Prior history of C-Diff. In last 3 months? YES____   NO__X___     *Antibiotic use in the past 6-8 weeks? NO____X__YES______                 If yes which  ANTIBIOTIC AND REASON______     *Prior hospitalization or nursing home in the last month?  YES____   NO____X
MD called about patient's BP being 189/90. Patient states she would prefer to take her medications at home. Okay with Dr. Meehan Client.
Medications administered and monitored by CRNA, see anesthesia record.     Electronically signed by Trish Pisano RN on 10/15/2021 at 10:57 AM
Patient arrived to Phase 2 awake and alert. Patient's vital signs are stable.
Patient up to chair with no complications. Patient has no complaints of dizziness at this time.
Phone message left to call PAT dept at [phone]  for history review and surgery instructions on 10/12/21 @ 0773
Pt arrived to PACU from endo alert and oriented. Pt denies pain. VSS. Will continue to monitor.
Pt denies pain. VSS. Ready for phase 2.
Unable to obtain an IV per 2 RNs and Dr. Riccardo Domínguez. PICC team called per Dr. Riccardo Domínguez.
toes      For your safety, please do not wear any jewelry or body piercing's on the day of surgery. All jewelry must be removed. If you have dentures, they will be removed before going to operating room. For your convenience, we will provide you with a container. If you wear contact lenses or glasses, they will be removed, please bring a case for them. If you have a living will and a durable power of  for healthcare, please bring in a copy. As part of our patient safety program to minimize surgical site infections, we ask you to do the following:    · Please notify your surgeon if you develop any illness between         now and the  day of your surgery. · This includes a cough, cold, fever, sore throat, nausea,         or vomiting, and diarrhea, etc.  ·  Please notify your surgeon if you experience dizziness, shortness         of breath or blurred vision between now and the time of your surgery. Do not shave your operative site 96 hours prior to surgery. For face and neck surgery, men may use an electric razor 48 hours   prior to surgery. You may shower the night before surgery or the morning of   your surgery with an antibacterial soap. You will need to bring a photo ID and insurance card    [facility] has an onsite pharmacy, would you like to utilize our pharmacy     If you will be staying overnight and use a C-pap machine, please bring   your C-pap to hospital     Our goal is to provide you with excellent care, therefore, visitors will be limited to two(2) in the room at a time so that we may focus on providing this care for you. Please contact pre-admission testing if you have any further questions. [facility] phone number:  [phone] PAT fax number:  [phone]  Please note these are generalized instructions for all surgical cases, you may be provided with more specific instructions according to your surgery.

## 2021-10-15 NOTE — OP NOTE
Endoscopy Note    Patient: Malia Yeung  : 1955  Acct#:     Procedure: Colonoscopy with intubation of the terminal ileum      Date:  10/15/2021    Surgeon:  Sheryl Camp MD, MD    Referring Physician:  Dr. Jl Arguello    Anesthesia:  TIVA    Indications: This is a 60y.o. year old female who presents today with  Screening colonoscopy    Previous Colonoscopy: YES  Date:   Greater than 3 years: Poor prep      Procedure: An informed consent was obtained from the patient after explanation of indications, benefits, possible risks and complications of the procedure. The patient was then taken to the endoscopy suite, placed in the left lateral decubitus position, and the above IV anesthesia was administered. A digital rectal examination was performed and revealed negative without mass, lesions or tenderness. The Olympus pediatric video colonoscope was placed in the patient's rectum under digital direction and advanced to the cecum. The cecum was identified by characteristic anatomy and ballottment. The prep was poor. With aggressive lavage, fair views were obtained. Polyps less than 5mm may have been missed. The ileocecal valve was identified and intubated. The ascending colon was examined twice to assure no sessile polyps missed. The scope was then withdrawn back through the cecum, ascending, transverse, descending and sigmoid colons. Carefull circumferential examination of the mucosa in these areas was performed. The scope was then withdrawn into the rectum and retroflexed. The scope was straightened, the colon was decompressed and the scope was withdrawn from the patient. Findings:  1. Normal Ileum  2. Normal colon other than small grade 1 internal hemorrhoids. The patient tolerated the procedure well and was taken to Recovery in good condition. No complications.     EBL: none  Specimens taken: none      Impression:   Normal colon other than small grade 1 internal hemorrhoids. Recommendations:   1. Clear liquid diet, advance as tolerated. 2.  Repeat colonoscopy in 5 years given prep. I notified office to set this up. 3.  Start linzess 145mcg daily  4. Call if this does not improve the constipation.     Lang Herman MD, MD   [address]  10/15/2021

## 2021-10-15 NOTE — ANESTHESIA PRE PROCEDURE
[facility] Department of Anesthesiology  Pre-Anesthesia Evaluation/Consultation       Name:  Bianca Ahumada  : 1955  Age:  60 y.o.                                            MRN:  [id number]  Date: 10/15/2021           Surgeon: Surgeon(s):  Thomas Contreras MD    Procedure: Procedure(s):  Colonoscopy      No Known Allergies  Patient Active Problem List   Diagnosis    HTN (hypertension)    Hyperlipidemia    CAD (coronary artery disease)    Palpitation    PVC (premature ventricular contraction)    Depression    Migraine with aura, intractable    Hemisensory loss    PTSD (post-traumatic stress disorder)    Insomnia    Snoring    Atypical chest pain    Dysarthria    Port-A-Cath in place    Inferior vena cava occlusion (HCC)    Cataract    Benign essential tremor    Tobacco use    Chronic diastolic CHF (congestive heart failure), NYHA class 2 ([address]    COPD (chronic obstructive pulmonary disease) ([address]    NSTEMI (non-ST elevated myocardial infarction) ([address]    Rotator cuff tendonitis    Essential hypertension    Fibromyalgia    Chronic pain syndrome    Headache, chronic migraine without aura, intractable, with status    Type 2 diabetes mellitus with diabetic chronic kidney disease ([address]    S/P right coronary artery (RCA) stent placement    Irritable bowel syndrome    Giant cell arteritis ([address]    Gastro-esophageal reflux disease without esophagitis    A-fib ([address]    Coronary artery disease involving native coronary artery of native heart with angina pectoris ([address]    DM (diabetes mellitus), type 2, uncontrolled ([address]    Right knee pain    Chronic painful diabetic neuropathy ([address]    CAD in native artery    Unstable angina (HCC)    Generalized weakness    Reactive airway disease with wheezing with acute exacerbation    Headache    DMII (diabetes mellitus, type 2) ([address]    Acute-on-chronic renal failure (HCC)    Age-related nuclear cataract, bilateral    Chronic prescription opiate use    Coronary stent restenosis due to progression of disease    Current moderate episode of major depressive disorder (HCC)    Diabetic retinopathy of both eyes without macular edema associated with type 2 diabetes mellitus [facility]    Hypertensive heart and chronic kidney disease with heart failure and stage 1 through stage 4 chronic kidney disease, or unspecified chronic kidney disease ([facility]    Hypertensive retinopathy, bilateral    Ischemic cardiomyopathy    Moderate episode of recurrent major depressive disorder ([facility]    Other age-related incipient cataract, bilateral    Presence of intraocular lens    Punctate keratitis, bilateral    Regular astigmatism, bilateral    Palliative care encounter    CKD (chronic kidney disease) stage 3, GFR 30-59 ml/min ([facility])    Compression of brain ([facility]    Thrombosis of superior vena cava, chronic ([facility])    Type 2 diabetes mellitus with mild nonproliferative diabetic retinopathy without macular edema, bilateral ([facility])    Uncomplicated opioid dependence ([facility]    Acute onset aura migraine    Angina at rest [facility])    Chest pain    [facility]) [facility]     Past Medical History:   Diagnosis Date    Acid reflux     Anemia     Anxiety and depression     Arthritis     Asthma     Atrial fibrillation (HCC)     CAD (coronary artery disease) 12/3/2012    Cerebral artery occlusion with cerebral infarction ([facility])     TIA\"\"S--right sided weakness & headache    CHF (congestive heart failure) ([facility])     Chronic kidney disease--stage III     40% kidney function    COPD (chronic obstructive pulmonary disease) ([facility]     DM2 (diabetes mellitus, type 2) ([facility]     Dysarthria     Fibromyalgia 6/7/2016    Headache(784.0) 2/19/2013    Hemisensory loss     History of blood transfusion 11/2020    pt denies having transfusion reaction    Hyperlipidemia     Hypertension     IBS (irritable bowel syndrome)     Inferior vena cava occlusion (HCC) IntraDERmal Once Mukesh Course, MD        sodium chloride flush 0.9 % injection 5-40 mL  5-40 mL IntraVENous 2 times per day Mukesh Course, MD        sodium chloride flush 0.9 % injection 5-40 mL  5-40 mL IntraVENous PRN Mukesh Course, MD        0.9 % sodium chloride infusion  25 mL IntraVENous PRN Mukesh Course, MD         Current Outpatient Medications on File Prior to Visit   Medication Sig Dispense Refill    metoprolol succinate (TOPROL XL) 50 MG extended release tablet Take 50 mg by mouth nightly      metoprolol succinate (TOPROL XL) 50 MG extended release tablet TAKE 0.5 TABLETS BY MOUTH 2 TIMES DAILY (WITH MEALS) (Patient taking differently: Take 25 mg by mouth See Admin Instructions Breakfast and lunch) 30 tablet 5    Ubrogepant (UBRELVY) 50 MG TABS TAKE 1 TABLET BY MOUTH AS NEEDED AT START OF HEADACHES, CAN REPEAT IN 24 HOURS 10 tablet 0    DULoxetine (CYMBALTA) 60 MG extended release capsule Take 1 capsule by mouth daily 30 capsule 1    oxyCODONE-acetaminophen (PERCOCET) 7.5-325 MG per tablet Take 1 tablet by mouth every 6 hours as needed for Pain (max 3 day) for up to 28 days.  84 tablet 0    QUEtiapine (SEROQUEL) 25 MG tablet Take 1 tablet by mouth nightly 60 tablet 1    tiZANidine (ZANAFLEX) 4 MG tablet Take 0.5-1 tablets by mouth 2 times daily as needed (muscle spasms) 60 tablet 0    Erenumab-aooe 140 MG/ML SOAJ Inject 140 mLs into the skin every 30 days 1 pen 1    insulin aspart (NOVOLOG FLEXPEN) 100 UNIT/ML injection pen Inject 3 Units into the skin 3 times daily (before meals) 5 pen 2    insulin glargine (BASAGLAR KWIKPEN) 100 UNIT/ML injection pen Inject 8 Units into the skin 2 times daily 5 pen 3    ondansetron (ZOFRAN) 4 MG tablet Take 1 tablet by mouth every 8 hours as needed for Nausea or Vomiting 21 tablet 1    ULTICARE MINI PEN NEEDLES 31G X 6 MM MISC USE WITH INSULINS FOUR TIMES A  each 0    UNIFINE PENTIPS 31G X 8 MM MISC USE WITH insulin pens 100 each 5    alirocumab (PRALUENT) 75 MG/ML SOAJ injection pen Inject 1 mL into the skin every 14 days 6 pen 3    torsemide (DEMADEX) 10 MG tablet Take 10 mg by mouth daily      omeprazole (PRILOSEC) 20 MG delayed release capsule TAKE 1 CAPSULE BY MOUTH DAILY 30 capsule 1    aspirin 81 MG chewable tablet Take 1 tablet by mouth daily 30 tablet 3    dicyclomine (BENTYL) 20 MG tablet Take 20 mg by mouth 4 times daily (before meals and nightly)       nitroGLYCERIN (NITROSTAT) 0.4 MG SL tablet Place 1 tablet under the tongue every 5 minutes as needed for Chest pain up to max of 3 total doses. If no relief after 1 dose, call 911. 25 tablet 3    Nutritional Supplements (ENSURE) LIQD Take 1 Can by mouth 3 times daily as needed (nutrition) 90 Can 5    atorvastatin (LIPITOR) 80 MG tablet Take 1 tablet by mouth nightly 90 tablet 5    amLODIPine (NORVASC) 5 MG tablet Take 1 tablet by mouth 2 times daily 180 tablet 5    blood glucose test strips (TRUE METRIX BLOOD GLUCOSE TEST) strip 1 each by Does not apply route 2 times daily 100 each 5    clopidogrel (PLAVIX) 75 MG tablet TAKE 1 TABLET BY MOUTH DA JULIEN 90 tablet 5    albuterol (PROVENTIL) (2.5 MG/3ML) 0.083% nebulizer solution Take 3 mLs by nebulization every 4 hours as needed for Wheezing 120 each 5    budesonide-formoterol (SYMBICORT) 160-4.5 MCG/ACT AERO Inhale 2 puffs into the lungs daily 2 Inhaler 5    albuterol sulfate HFA (VENTOLIN HFA) 108 (90 Base) MCG/ACT inhaler Inhale 2 puffs into the lungs every 4 hours as needed for Wheezing or Shortness of Breath 3 Inhaler 5    ranolazine (RANEXA) 1000 MG extended release tablet Take 1 tablet by mouth 2 times daily 60 tablet 8     No current facility-administered medications for this visit. No current outpatient medications on file.      Facility-Administered Medications Ordered in Other Visits   Medication Dose Route Frequency Provider Last Rate Last Admin    0.9 % sodium chloride infusion IntraVENous Continuous Mabel Lee MD        sodium chloride flush 0.9 % injection 10 mL  10 mL IntraVENous 2 times per day Mabel Lee MD        sodium chloride flush 0.9 % injection 10 mL  10 mL IntraVENous PRN Mabel Lee MD        0.9 % sodium chloride infusion  25 mL IntraVENous PRN Mabel Lee MD        lidocaine PF 1 % injection 5 mL  5 mL IntraDERmal Once Jeanine Lenz MD        sodium chloride flush 0.9 % injection 5-40 mL  5-40 mL IntraVENous 2 times per day Jeanine Lenz MD        sodium chloride flush 0.9 % injection 5-40 mL  5-40 mL IntraVENous PRN Jeanine Lenz MD        0.9 % sodium chloride infusion  25 mL IntraVENous PRN Jeanine Lenz MD         Vital Signs (Current)   There were no vitals filed for this visit. Vital Signs Statistics (for past 48 hrs)     Temp  Av.2 °F (36.2 °C)  Min: 97.2 °F (36.2 °C)   Min taken time: 10/15/21 0735  Max: 97.2 °F (36.2 °C)   Max taken time: 10/15/21 0735  Pulse  Av  Min: 73   Min taken time: 10/15/21 0735  Max: 73   Max taken time: 10/15/21 0735  Resp  Avg: 15  Min: 15   Min taken time: 10/15/21 0735  Max: 15   Max taken time: 10/15/21 0735  BP  Min: 132/72   Min taken time: 10/15/21 0735  Max: 132/72   Max taken time: 10/15/21 0735  SpO2  Av %  Min: 99 %   Min taken time: 10/15/21 0735  Max: 99 %   Max taken time: 10/15/21 0735    BP Readings from Last 3 Encounters:   10/15/21 132/72   21 138/73   21 110/78     BMI  There is no height or weight on file to calculate BMI. Estimated body mass index is 21.83 kg/m² as calculated from the following:    Height as of an earlier encounter on 10/15/21: 5' (1.524 m). Weight as of an earlier encounter on 10/15/21: 111 lb 12.4 oz (50.7 kg).     CBC   Lab Results   Component Value Date    WBC 4.9 2021    RBC 2.80 2021    HGB 8.9 2021    HCT 26.6 2021    MCV 94.9 2021    RDW 15.0 2021     2021 CMP    Lab Results   Component Value Date     07/30/2021    K 5.1 07/30/2021    K 4.9 06/19/2021     07/30/2021    CO2 24 07/30/2021    BUN 27 07/30/2021    CREATININE 1.2 07/30/2021    GFRAA 55 07/30/2021    GFRAA 60 05/23/2013    AGRATIO 1.0 07/30/2021    LABGLOM 45 07/30/2021    GLUCOSE 117 07/30/2021    PROT 6.8 07/30/2021    PROT 8.1 02/15/2013    CALCIUM 8.9 07/30/2021    BILITOT <0.2 07/30/2021    ALKPHOS 128 07/30/2021    AST 18 07/30/2021    ALT 21 07/30/2021     BMP    Lab Results   Component Value Date     07/30/2021    K 5.1 07/30/2021    K 4.9 06/19/2021     07/30/2021    CO2 24 07/30/2021    BUN 27 07/30/2021    CREATININE 1.2 07/30/2021    CALCIUM 8.9 07/30/2021    GFRAA 55 07/30/2021    GFRAA 60 05/23/2013    LABGLOM 45 07/30/2021    GLUCOSE 117 07/30/2021     POCGlucose  No results for input(s): GLUCOSE in the last 72 hours.    Coags    Lab Results   Component Value Date    PROTIME 12.0 06/19/2021    PROTIME 10.3 12/15/2009    INR 1.03 06/19/2021    APTT 52.4 58/31/6002     HCG (If Applicable) No results found for: PREGTESTUR, PREGSERUM, HCG, HCGQUANT   ABGs   Lab Results   Component Value Date    PHART 7.367 07/28/2019    PO2ART 97.4 07/28/2019    HAR6XHI 29.6 07/28/2019    OOE2YIT 16.6 07/28/2019    BEART -7.4 07/28/2019    C5OXEOAV 98.3 07/28/2019      Type & Screen (If Applicable)  No results found for: LABABO, LABRH                         BMI: Wt Readings from Last 3 Encounters:       NPO Status:                          Anesthesia Evaluation  Patient summary reviewed no history of anesthetic complications:   Airway: Mallampati: III  TM distance: >3 FB   Neck ROM: full   Dental:    (+) edentulous      Pulmonary:   (+) COPD: moderate,  shortness of breath:  asthma:     (-) recent URI, sleep apnea and not a current smoker                           Cardiovascular:    (+) hypertension:, angina: no interval change, past MI:, CAD:, CABG/stent (multiple Stents, angioplasty and stent 11/2020):, dysrhythmias (PVCs): atrial fibrillation, CHF (EF 70):,       ECG reviewed        Stress test reviewed       Beta Blocker:  Dose within 24 Hrs         Neuro/Psych:   (+) CVA (right sided weakness): residual symptoms, neuromuscular disease:, headaches:, psychiatric history:depression/anxiety    (-) seizures           GI/Hepatic/Renal:   (+) GERD:, renal disease: CRI, bowel prep,          ROS comment: Recently hospitalized with KOLBY and chest pain. Endo/Other:    (+) DiabetesType II DM, using insulin, blood dyscrasia::., .                 Abdominal:             Vascular: negative vascular ROS. Other Findings:               Anesthesia Plan      MAC     ASA 4     (I discussed intravenous sedation to the patient's satisfaction including risks and alternatives. The patient agreed with the plan and has no further questions. Mika Parikh MD )  Induction: intravenous. Anesthetic plan and risks discussed with patient. Plan discussed with CRNA. This pre-anesthesia assessment may be used as a history and physical.    DOS STAFF ADDENDUM:    Pt seen and examined, chart reviewed (including anesthesia, drug and allergy history). No interval changes to history and physical examination. Anesthetic plan, risks, benefits, alternatives, and personnel involved discussed with patient. Questions and concerns addressed. Patient(family) verbalized an understanding and agrees to proceed.       Mika Parikh MD  October 15, 2021  9:56 AM

## 2021-10-18 ENCOUNTER — APPOINTMENT (OUTPATIENT)
Dept: CT IMAGING | Age: 66
DRG: 392 | End: 2021-10-18
Payer: MEDICARE

## 2021-10-18 ENCOUNTER — APPOINTMENT (OUTPATIENT)
Dept: GENERAL RADIOLOGY | Age: 66
DRG: 392 | End: 2021-10-18
Payer: MEDICARE

## 2021-10-18 ENCOUNTER — HOSPITAL ENCOUNTER (INPATIENT)
Age: 66
LOS: 5 days | Discharge: HOME | DRG: 392 | End: 2021-10-23
Attending: STUDENT IN AN ORGANIZED HEALTH CARE EDUCATION/TRAINING PROGRAM | Admitting: STUDENT IN AN ORGANIZED HEALTH CARE EDUCATION/TRAINING PROGRAM
Payer: MEDICARE

## 2021-10-18 DIAGNOSIS — N17.9 AKI (ACUTE KIDNEY INJURY) (HCC): ICD-10-CM

## 2021-10-18 DIAGNOSIS — R06.02 SHORTNESS OF BREATH: ICD-10-CM

## 2021-10-18 DIAGNOSIS — I87.1 CHRONIC SUPERIOR VENA CAVA OCCLUSION: ICD-10-CM

## 2021-10-18 DIAGNOSIS — R07.9 CHEST PAIN, UNSPECIFIED TYPE: Primary | ICD-10-CM

## 2021-10-18 LAB
A/G RATIO: 0.9 (ref 1.1–2.2)
ALBUMIN SERPL-MCNC: 3.6 G/DL (ref 3.4–5)
ALP BLD-CCNC: 134 U/L (ref 40–129)
ALT SERPL-CCNC: 42 U/L (ref 10–40)
ANION GAP SERPL CALCULATED.3IONS-SCNC: 15 MMOL/L (ref 3–16)
AST SERPL-CCNC: 46 U/L (ref 15–37)
BASOPHILS ABSOLUTE: 0 K/UL (ref 0–0.2)
BASOPHILS RELATIVE PERCENT: 0.6 %
BILIRUB SERPL-MCNC: 0.3 MG/DL (ref 0–1)
BUN BLDV-MCNC: 12 MG/DL (ref 7–20)
CALCIUM SERPL-MCNC: 9.6 MG/DL (ref 8.3–10.6)
CHLORIDE BLD-SCNC: 104 MMOL/L (ref 99–110)
CO2: 19 MMOL/L (ref 21–32)
CREAT SERPL-MCNC: 0.9 MG/DL (ref 0.6–1.2)
EOSINOPHILS ABSOLUTE: 0.1 K/UL (ref 0–0.6)
EOSINOPHILS RELATIVE PERCENT: 2.4 %
GFR AFRICAN AMERICAN: >60
GFR NON-AFRICAN AMERICAN: >60
GLOBULIN: 4 G/DL
GLUCOSE BLD-MCNC: 132 MG/DL (ref 70–99)
GLUCOSE BLD-MCNC: 144 MG/DL (ref 70–99)
GLUCOSE BLD-MCNC: 148 MG/DL (ref 70–99)
GLUCOSE BLD-MCNC: 180 MG/DL (ref 70–99)
HCT VFR BLD CALC: 35 % (ref 36–48)
HEMOGLOBIN: 11.3 G/DL (ref 12–16)
LIPASE: 35 U/L (ref 13–60)
LYMPHOCYTES ABSOLUTE: 1.1 K/UL (ref 1–5.1)
LYMPHOCYTES RELATIVE PERCENT: 20.4 %
MCH RBC QN AUTO: 31.7 PG (ref 26–34)
MCHC RBC AUTO-ENTMCNC: 32.4 G/DL (ref 31–36)
MCV RBC AUTO: 97.7 FL (ref 80–100)
MONOCYTES ABSOLUTE: 0.2 K/UL (ref 0–1.3)
MONOCYTES RELATIVE PERCENT: 3.9 %
NEUTROPHILS ABSOLUTE: 4 K/UL (ref 1.7–7.7)
NEUTROPHILS RELATIVE PERCENT: 72.7 %
PDW BLD-RTO: 14.9 % (ref 12.4–15.4)
PERFORMED ON: ABNORMAL
PLATELET # BLD: 189 K/UL (ref 135–450)
PLATELET SLIDE REVIEW: ADEQUATE
PMV BLD AUTO: 8.6 FL (ref 5–10.5)
POTASSIUM REFLEX MAGNESIUM: 4.3 MMOL/L (ref 3.5–5.1)
PRO-BNP: 1421 PG/ML (ref 0–124)
RBC # BLD: 3.58 M/UL (ref 4–5.2)
RBC # BLD: NORMAL 10*6/UL
SLIDE REVIEW: ABNORMAL
SODIUM BLD-SCNC: 138 MMOL/L (ref 136–145)
TOTAL PROTEIN: 7.6 G/DL (ref 6.4–8.2)
TROPONIN: <0.01 NG/ML
TROPONIN: <0.01 NG/ML
VACUOLATED NEUTROPHILS: PRESENT
WBC # BLD: 5.6 K/UL (ref 4–11)

## 2021-10-18 PROCEDURE — 93005 ELECTROCARDIOGRAM TRACING: CPT | Performed by: NURSE PRACTITIONER

## 2021-10-18 PROCEDURE — 94640 AIRWAY INHALATION TREATMENT: CPT

## 2021-10-18 PROCEDURE — 83036 HEMOGLOBIN GLYCOSYLATED A1C: CPT

## 2021-10-18 PROCEDURE — 96376 TX/PRO/DX INJ SAME DRUG ADON: CPT

## 2021-10-18 PROCEDURE — 83880 ASSAY OF NATRIURETIC PEPTIDE: CPT

## 2021-10-18 PROCEDURE — 36415 COLL VENOUS BLD VENIPUNCTURE: CPT

## 2021-10-18 PROCEDURE — 71260 CT THORAX DX C+: CPT

## 2021-10-18 PROCEDURE — 1200000000 HC SEMI PRIVATE

## 2021-10-18 PROCEDURE — 96374 THER/PROPH/DIAG INJ IV PUSH: CPT

## 2021-10-18 PROCEDURE — 94760 N-INVAS EAR/PLS OXIMETRY 1: CPT

## 2021-10-18 PROCEDURE — 6370000000 HC RX 637 (ALT 250 FOR IP): Performed by: STUDENT IN AN ORGANIZED HEALTH CARE EDUCATION/TRAINING PROGRAM

## 2021-10-18 PROCEDURE — 80053 COMPREHEN METABOLIC PANEL: CPT

## 2021-10-18 PROCEDURE — 84484 ASSAY OF TROPONIN QUANT: CPT

## 2021-10-18 PROCEDURE — 6360000002 HC RX W HCPCS: Performed by: NURSE PRACTITIONER

## 2021-10-18 PROCEDURE — 85025 COMPLETE CBC W/AUTO DIFF WBC: CPT

## 2021-10-18 PROCEDURE — 2580000003 HC RX 258: Performed by: STUDENT IN AN ORGANIZED HEALTH CARE EDUCATION/TRAINING PROGRAM

## 2021-10-18 PROCEDURE — 96375 TX/PRO/DX INJ NEW DRUG ADDON: CPT

## 2021-10-18 PROCEDURE — 83690 ASSAY OF LIPASE: CPT

## 2021-10-18 PROCEDURE — 71046 X-RAY EXAM CHEST 2 VIEWS: CPT

## 2021-10-18 PROCEDURE — G0008 ADMIN INFLUENZA VIRUS VAC: HCPCS | Performed by: STUDENT IN AN ORGANIZED HEALTH CARE EDUCATION/TRAINING PROGRAM

## 2021-10-18 PROCEDURE — 90686 IIV4 VACC NO PRSV 0.5 ML IM: CPT | Performed by: STUDENT IN AN ORGANIZED HEALTH CARE EDUCATION/TRAINING PROGRAM

## 2021-10-18 PROCEDURE — 6360000004 HC RX CONTRAST MEDICATION: Performed by: NURSE PRACTITIONER

## 2021-10-18 PROCEDURE — 6370000000 HC RX 637 (ALT 250 FOR IP): Performed by: NURSE PRACTITIONER

## 2021-10-18 PROCEDURE — 6360000002 HC RX W HCPCS: Performed by: STUDENT IN AN ORGANIZED HEALTH CARE EDUCATION/TRAINING PROGRAM

## 2021-10-18 PROCEDURE — 99285 EMERGENCY DEPT VISIT HI MDM: CPT

## 2021-10-18 RX ORDER — AMLODIPINE BESYLATE 5 MG/1
5 TABLET ORAL 2 TIMES DAILY
Status: DISCONTINUED | OUTPATIENT
Start: 2021-10-18 | End: 2021-10-19

## 2021-10-18 RX ORDER — DEXTROSE MONOHYDRATE 50 MG/ML
100 INJECTION, SOLUTION INTRAVENOUS PRN
Status: DISCONTINUED | OUTPATIENT
Start: 2021-10-18 | End: 2021-10-18 | Stop reason: SDUPTHER

## 2021-10-18 RX ORDER — BUDESONIDE AND FORMOTEROL FUMARATE DIHYDRATE 160; 4.5 UG/1; UG/1
2 AEROSOL RESPIRATORY (INHALATION) DAILY
Status: DISCONTINUED | OUTPATIENT
Start: 2021-10-18 | End: 2021-10-23 | Stop reason: HOSPADM

## 2021-10-18 RX ORDER — PANTOPRAZOLE SODIUM 40 MG/1
40 TABLET, DELAYED RELEASE ORAL
Status: DISCONTINUED | OUTPATIENT
Start: 2021-10-19 | End: 2021-10-23 | Stop reason: HOSPADM

## 2021-10-18 RX ORDER — OXYCODONE AND ACETAMINOPHEN 7.5; 325 MG/1; MG/1
1 TABLET ORAL EVERY 6 HOURS PRN
Status: DISCONTINUED | OUTPATIENT
Start: 2021-10-18 | End: 2021-10-19

## 2021-10-18 RX ORDER — QUETIAPINE FUMARATE 25 MG/1
25 TABLET, FILM COATED ORAL NIGHTLY
Status: DISCONTINUED | OUTPATIENT
Start: 2021-10-18 | End: 2021-10-23 | Stop reason: HOSPADM

## 2021-10-18 RX ORDER — ONDANSETRON 4 MG/1
4 TABLET, ORALLY DISINTEGRATING ORAL EVERY 8 HOURS PRN
Status: DISCONTINUED | OUTPATIENT
Start: 2021-10-18 | End: 2021-10-23 | Stop reason: HOSPADM

## 2021-10-18 RX ORDER — ACETAMINOPHEN 325 MG/1
650 TABLET ORAL EVERY 6 HOURS PRN
Status: DISCONTINUED | OUTPATIENT
Start: 2021-10-18 | End: 2021-10-23 | Stop reason: HOSPADM

## 2021-10-18 RX ORDER — NICOTINE POLACRILEX 4 MG
15 LOZENGE BUCCAL PRN
Status: DISCONTINUED | OUTPATIENT
Start: 2021-10-18 | End: 2021-10-18 | Stop reason: SDUPTHER

## 2021-10-18 RX ORDER — SODIUM CHLORIDE 0.9 % (FLUSH) 0.9 %
5-40 SYRINGE (ML) INJECTION EVERY 12 HOURS SCHEDULED
Status: DISCONTINUED | OUTPATIENT
Start: 2021-10-18 | End: 2021-10-23 | Stop reason: HOSPADM

## 2021-10-18 RX ORDER — DEXTROSE MONOHYDRATE 25 G/50ML
12.5 INJECTION, SOLUTION INTRAVENOUS PRN
Status: DISCONTINUED | OUTPATIENT
Start: 2021-10-18 | End: 2021-10-23 | Stop reason: HOSPADM

## 2021-10-18 RX ORDER — ATORVASTATIN CALCIUM 80 MG/1
80 TABLET, FILM COATED ORAL NIGHTLY
Status: DISCONTINUED | OUTPATIENT
Start: 2021-10-18 | End: 2021-10-23 | Stop reason: HOSPADM

## 2021-10-18 RX ORDER — ACETAMINOPHEN 650 MG/1
650 SUPPOSITORY RECTAL EVERY 6 HOURS PRN
Status: DISCONTINUED | OUTPATIENT
Start: 2021-10-18 | End: 2021-10-23 | Stop reason: HOSPADM

## 2021-10-18 RX ORDER — DEXTROSE MONOHYDRATE 50 MG/ML
100 INJECTION, SOLUTION INTRAVENOUS PRN
Status: DISCONTINUED | OUTPATIENT
Start: 2021-10-18 | End: 2021-10-23 | Stop reason: HOSPADM

## 2021-10-18 RX ORDER — NITROGLYCERIN 0.4 MG/1
0.4 TABLET SUBLINGUAL EVERY 5 MIN PRN
Status: DISCONTINUED | OUTPATIENT
Start: 2021-10-18 | End: 2021-10-23 | Stop reason: HOSPADM

## 2021-10-18 RX ORDER — RANOLAZINE 500 MG/1
1000 TABLET, EXTENDED RELEASE ORAL 2 TIMES DAILY
Status: DISCONTINUED | OUTPATIENT
Start: 2021-10-18 | End: 2021-10-23 | Stop reason: HOSPADM

## 2021-10-18 RX ORDER — MORPHINE SULFATE 4 MG/ML
4 INJECTION, SOLUTION INTRAMUSCULAR; INTRAVENOUS
Status: COMPLETED | OUTPATIENT
Start: 2021-10-18 | End: 2021-10-18

## 2021-10-18 RX ORDER — DULOXETIN HYDROCHLORIDE 60 MG/1
60 CAPSULE, DELAYED RELEASE ORAL DAILY
Status: DISCONTINUED | OUTPATIENT
Start: 2021-10-18 | End: 2021-10-23 | Stop reason: HOSPADM

## 2021-10-18 RX ORDER — POLYETHYLENE GLYCOL 3350 17 G/17G
17 POWDER, FOR SOLUTION ORAL DAILY PRN
Status: DISCONTINUED | OUTPATIENT
Start: 2021-10-18 | End: 2021-10-23 | Stop reason: HOSPADM

## 2021-10-18 RX ORDER — NICOTINE POLACRILEX 4 MG
15 LOZENGE BUCCAL PRN
Status: DISCONTINUED | OUTPATIENT
Start: 2021-10-18 | End: 2021-10-23 | Stop reason: HOSPADM

## 2021-10-18 RX ORDER — ONDANSETRON 2 MG/ML
4 INJECTION INTRAMUSCULAR; INTRAVENOUS EVERY 6 HOURS PRN
Status: DISCONTINUED | OUTPATIENT
Start: 2021-10-18 | End: 2021-10-23 | Stop reason: HOSPADM

## 2021-10-18 RX ORDER — SODIUM CHLORIDE 9 MG/ML
25 INJECTION, SOLUTION INTRAVENOUS PRN
Status: DISCONTINUED | OUTPATIENT
Start: 2021-10-18 | End: 2021-10-23 | Stop reason: HOSPADM

## 2021-10-18 RX ORDER — ACETAMINOPHEN 500 MG
1000 TABLET ORAL ONCE
Status: COMPLETED | OUTPATIENT
Start: 2021-10-18 | End: 2021-10-18

## 2021-10-18 RX ORDER — TIZANIDINE 4 MG/1
2 TABLET ORAL 2 TIMES DAILY PRN
Status: DISCONTINUED | OUTPATIENT
Start: 2021-10-18 | End: 2021-10-23 | Stop reason: HOSPADM

## 2021-10-18 RX ORDER — ASPIRIN 81 MG/1
81 TABLET, CHEWABLE ORAL DAILY
Status: DISCONTINUED | OUTPATIENT
Start: 2021-10-19 | End: 2021-10-23 | Stop reason: HOSPADM

## 2021-10-18 RX ORDER — SODIUM CHLORIDE 0.9 % (FLUSH) 0.9 %
5-40 SYRINGE (ML) INJECTION PRN
Status: DISCONTINUED | OUTPATIENT
Start: 2021-10-18 | End: 2021-10-23 | Stop reason: HOSPADM

## 2021-10-18 RX ORDER — CLOPIDOGREL BISULFATE 75 MG/1
75 TABLET ORAL DAILY
Status: DISCONTINUED | OUTPATIENT
Start: 2021-10-19 | End: 2021-10-23 | Stop reason: HOSPADM

## 2021-10-18 RX ORDER — DEXTROSE MONOHYDRATE 25 G/50ML
12.5 INJECTION, SOLUTION INTRAVENOUS PRN
Status: DISCONTINUED | OUTPATIENT
Start: 2021-10-18 | End: 2021-10-18 | Stop reason: SDUPTHER

## 2021-10-18 RX ORDER — INSULIN GLARGINE 100 [IU]/ML
5 INJECTION, SOLUTION SUBCUTANEOUS 2 TIMES DAILY
Status: DISCONTINUED | OUTPATIENT
Start: 2021-10-18 | End: 2021-10-23 | Stop reason: HOSPADM

## 2021-10-18 RX ORDER — TORSEMIDE 20 MG/1
10 TABLET ORAL DAILY
Status: DISCONTINUED | OUTPATIENT
Start: 2021-10-18 | End: 2021-10-20

## 2021-10-18 RX ORDER — ONDANSETRON 2 MG/ML
4 INJECTION INTRAMUSCULAR; INTRAVENOUS EVERY 30 MIN PRN
Status: DISCONTINUED | OUTPATIENT
Start: 2021-10-18 | End: 2021-10-18 | Stop reason: HOSPADM

## 2021-10-18 RX ADMIN — ONDANSETRON 4 MG: 4 TABLET, ORALLY DISINTEGRATING ORAL at 16:48

## 2021-10-18 RX ADMIN — Medication 2 PUFF: at 13:11

## 2021-10-18 RX ADMIN — QUETIAPINE FUMARATE 25 MG: 25 TABLET ORAL at 21:13

## 2021-10-18 RX ADMIN — NITROGLYCERIN 0.5 INCH: 20 OINTMENT TOPICAL at 08:49

## 2021-10-18 RX ADMIN — AMLODIPINE BESYLATE 5 MG: 5 TABLET ORAL at 21:13

## 2021-10-18 RX ADMIN — RANOLAZINE 1000 MG: 500 TABLET, FILM COATED, EXTENDED RELEASE ORAL at 13:43

## 2021-10-18 RX ADMIN — ACETAMINOPHEN 1000 MG: 500 TABLET ORAL at 08:48

## 2021-10-18 RX ADMIN — INSULIN LISPRO 1 UNITS: 100 INJECTION, SOLUTION INTRAVENOUS; SUBCUTANEOUS at 21:18

## 2021-10-18 RX ADMIN — IOPAMIDOL 75 ML: 755 INJECTION, SOLUTION INTRAVENOUS at 10:01

## 2021-10-18 RX ADMIN — DULOXETINE HYDROCHLORIDE 60 MG: 60 CAPSULE, DELAYED RELEASE ORAL at 13:43

## 2021-10-18 RX ADMIN — ONDANSETRON 4 MG: 2 INJECTION INTRAMUSCULAR; INTRAVENOUS at 08:48

## 2021-10-18 RX ADMIN — OXYCODONE AND ACETAMINOPHEN 1 TABLET: 7.5; 325 TABLET ORAL at 16:48

## 2021-10-18 RX ADMIN — METOPROLOL SUCCINATE 75 MG: 50 TABLET, EXTENDED RELEASE ORAL at 21:13

## 2021-10-18 RX ADMIN — RANOLAZINE 1000 MG: 500 TABLET, FILM COATED, EXTENDED RELEASE ORAL at 21:13

## 2021-10-18 RX ADMIN — INFLUENZA A VIRUS A/VICTORIA/2570/2019 IVR-215 (H1N1) ANTIGEN (PROPIOLACTONE INACTIVATED), INFLUENZA A VIRUS A/CAMBODIA/E0826360/2020 IVR-224 (H3N2) ANTIGEN (PROPIOLACTONE INACTIVATED), INFLUENZA B VIRUS B/VICTORIA/705/2018 BVR-11 ANTIGEN (PROPIOLACTONE INACTIVATED), INFLUENZA B VIRUS B/PHUKET/3073/2013 BVR-1B ANTIGEN (PROPIOLACTONE INACTIVATED) 0.5 ML: 15; 15; 15; 15 INJECTION, SUSPENSION INTRAMUSCULAR at 21:13

## 2021-10-18 RX ADMIN — INSULIN GLARGINE 5 UNITS: 100 INJECTION, SOLUTION SUBCUTANEOUS at 13:42

## 2021-10-18 RX ADMIN — TORSEMIDE 10 MG: 20 TABLET ORAL at 13:42

## 2021-10-18 RX ADMIN — AMLODIPINE BESYLATE 5 MG: 5 TABLET ORAL at 13:42

## 2021-10-18 RX ADMIN — SODIUM CHLORIDE, PRESERVATIVE FREE 10 ML: 5 INJECTION INTRAVENOUS at 21:16

## 2021-10-18 RX ADMIN — ATORVASTATIN CALCIUM 80 MG: 80 TABLET, FILM COATED ORAL at 21:13

## 2021-10-18 RX ADMIN — MORPHINE SULFATE 4 MG: 4 INJECTION, SOLUTION INTRAMUSCULAR; INTRAVENOUS at 09:53

## 2021-10-18 RX ADMIN — MORPHINE SULFATE 4 MG: 4 INJECTION, SOLUTION INTRAMUSCULAR; INTRAVENOUS at 08:48

## 2021-10-18 RX ADMIN — HYDROMORPHONE HYDROCHLORIDE 1 MG: 1 INJECTION, SOLUTION INTRAMUSCULAR; INTRAVENOUS; SUBCUTANEOUS at 11:41

## 2021-10-18 RX ADMIN — INSULIN GLARGINE 5 UNITS: 100 INJECTION, SOLUTION SUBCUTANEOUS at 21:17

## 2021-10-18 ASSESSMENT — PAIN DESCRIPTION - FREQUENCY: FREQUENCY: CONTINUOUS

## 2021-10-18 ASSESSMENT — PAIN - FUNCTIONAL ASSESSMENT: PAIN_FUNCTIONAL_ASSESSMENT: PREVENTS OR INTERFERES SOME ACTIVE ACTIVITIES AND ADLS

## 2021-10-18 ASSESSMENT — PAIN DESCRIPTION - ONSET: ONSET: ON-GOING

## 2021-10-18 ASSESSMENT — PAIN DESCRIPTION - PAIN TYPE: TYPE: ACUTE PAIN

## 2021-10-18 ASSESSMENT — PAIN SCALES - GENERAL
PAINLEVEL_OUTOF10: 10
PAINLEVEL_OUTOF10: 8
PAINLEVEL_OUTOF10: 5
PAINLEVEL_OUTOF10: 10
PAINLEVEL_OUTOF10: 10
PAINLEVEL_OUTOF10: 8
PAINLEVEL_OUTOF10: 6
PAINLEVEL_OUTOF10: 10

## 2021-10-18 ASSESSMENT — PAIN DESCRIPTION - ORIENTATION: ORIENTATION: LEFT

## 2021-10-18 ASSESSMENT — PAIN DESCRIPTION - LOCATION: LOCATION: CHEST

## 2021-10-18 ASSESSMENT — PAIN DESCRIPTION - PROGRESSION: CLINICAL_PROGRESSION: NOT CHANGED

## 2021-10-18 ASSESSMENT — PAIN DESCRIPTION - DESCRIPTORS: DESCRIPTORS: ACHING

## 2021-10-18 ASSESSMENT — HEART SCORE: ECG: 1

## 2021-10-18 NOTE — PROGRESS NOTES
Medication Reconciliation    List of medications patient is currently taking is complete. Source of information: 1. Conversation with patient at bedside                                      2. EPIC records      Allergies  Patient has no known allergies. Notes regarding home medications:   1. Patient received a dose of ASA and NTG earlier today - she has not taken any of her other home medications yet today. 2. Patient takes Toprol XL 25 mg po BID and 50 mg po QHS.     Dann Hummel [facility], PharmD, BCPS  10/18/2021 10:45 AM

## 2021-10-18 NOTE — H&P
Hospital Medicine History & Physical      PCP: Scotty Burr MD    Date of Admission: 10/18/2021    Date of Service: Pt seen/examined on 10/18/21   and Admitted to Inpatient. Chief Complaint:  Chest pain. History Of Present Illness: The patient is a 60 y.o. female who presents to [facility] with chest pressure. Patient with a past medical history of extensive CAD with multiple angioplasty and stents inserted ( DEANGELO to RCA on 06/02/2016. DEANGELO to prox RCA and left circumflex on 07/12/2018. Balloon angioplasty alone to OM1 and stent to right PDA on 11/30/2020. Normal perfusion study on 03/11/2021), atrial fibrillation, chronic diastolic CHF, diabetes mellitus, hypertension, hyperlipidemia, history of CVA, CKD, COPD. Patient presented to emergency with chest pain, chest pain started at 6 AM this morning, pressure-like, 10/10 in severity, radiating to the left arm and left back, associated with nausea, patient had taken 3 doses of nitro with no improvement, took her morning dose of aspirin and presented to emergency. Patient with extensive CAD as above, follows with Dr. Jammie Davidson, reported some shortness of breath, denies any recent history of lower limb swelling, abdominal swelling or shortness of breath more than baseline. At the time of exam patient had already received nitroglycerin however continued to complain of pain was given another nitroglycerin as well as Dilaudid.      Past Medical History:        Diagnosis Date    Acid reflux     Anemia     Anxiety and depression     Arthritis     Asthma     Atrial fibrillation (HCC)     CAD (coronary artery disease) 12/3/2012    Cerebral artery occlusion with cerebral infarction [facility])     TIA\"\"S--right sided weakness & headache    CHF (congestive heart failure) (HCC)     Chronic kidney disease--stage III     40% kidney function    COPD (chronic obstructive pulmonary disease) (HCC)     DM2 (diabetes mellitus, type 2) ([facility]     Dysarthria     Fibromyalgia 6/7/2016    Headache(784.0) 2/19/2013    Hemisensory loss     History of blood transfusion 11/2020    pt denies having transfusion reaction    Hyperlipidemia     Hypertension     IBS (irritable bowel syndrome)     Inferior vena cava occlusion (HCC)     Keratitis     MI, old     Neuropathy     Superior vena cava obstruction     Temporal arteritis ([facility] 2/24/2014    Wears glasses        Past Surgical History:        Procedure Laterality Date    ABLATION OF DYSRHYTHMIC FOCUS  1999  and 11/2020    times 2    ARTERY BIOPSY Right 04/23/2014    RIGHT TEMPORAL ARTERY BIOPSY    CAROTID ARTERY SURGERY Left     clean up per pt    CATARACT REMOVAL Bilateral     CHOLECYSTECTOMY      COLONOSCOPY N/A 4/9/2021    COLONOSCOPY WITH BIOPSY performed by Mary Woods MD at [address] [facility] 10/15/2021    COLONOSCOPY performed by Mary Woods MD at [facility] 19 2020    HYSTERECTOMY      JOINT REPLACEMENT Right     KNEE ARTHROSCOPY Right     PTCA  10/2019    LAD and RCA inrtervention    TUNNELED VENOUS PORT PLACEMENT      left thigh. SMART PORT-----Removed--total of 4 port placement and removal    UPPER GASTROINTESTINAL ENDOSCOPY N/A 7/6/2020    EGD DIAGNOSTIC ONLY performed by Theresa Greene MD at [address] [facility]       Medications Prior to Admission:    Prior to Admission medications    Medication Sig Start Date End Date Taking?  Authorizing Provider   linaclotide Derrill Simpler) 145 MCG capsule Take 1 capsule by mouth every morning (before breakfast) 10/15/21  Yes Mary Woods MD   metoprolol succinate (TOPROL XL) 50 MG extended release tablet Take 50 mg by mouth nightly   Yes Historical Provider, MD   metoprolol succinate (TOPROL XL) 50 MG extended release tablet TAKE 0.5 TABLETS BY MOUTH 2 TIMES DAILY (WITH MEALS)  Patient taking differently: Take 25 mg by mouth See Admin Instructions Breakfast and lunch 9/28/21  Yes César Upton MD   Ubrogepant (UBRELVY) 50 MG TABS TAKE 1 TABLET BY MOUTH AS NEEDED AT START OF HEADACHES, CAN REPEAT IN 24 HOURS 9/21/21  Yes Saskia Chew MD   DULoxetine (CYMBALTA) 60 MG extended release capsule Take 1 capsule by mouth daily 9/21/21  Yes Saskia Chew MD   oxyCODONE-acetaminophen (PERCOCET) 7.5-325 MG per tablet Take 1 tablet by mouth every 6 hours as needed for Pain (max 3 day) for up to 28 days.  9/21/21 10/19/21 Yes Saskia Chew MD   QUEtiapine (SEROQUEL) 25 MG tablet Take 1 tablet by mouth nightly 9/21/21  Yes Saskia Chew MD   tiZANidine (ZANAFLEX) 4 MG tablet Take 0.5-1 tablets by mouth 2 times daily as needed (muscle spasms) 9/21/21  Yes Saskia Chew MD   Erenumab-aooe 140 MG/ML SOAJ Inject 140 mLs into the skin every 30 days 8/20/21  Yes Saskia Chew MD   insulin aspart (NOVOLOG FLEXPEN) 100 UNIT/ML injection pen Inject 3 Units into the skin 3 times daily (before meals) 7/20/21  Yes César Upton MD   insulin glargine (BASAGLAR KWIKPEN) 100 UNIT/ML injection pen Inject 8 Units into the skin 2 times daily 7/20/21  Yes César Upton MD   ondansetron (ZOFRAN) 4 MG tablet Take 1 tablet by mouth every 8 hours as needed for Nausea or Vomiting 7/20/21  Yes César Upton MD   ULTICARE MINI PEN NEEDLES 31G X 6 MM MISC USE WITH INSULINS FOUR TIMES A DAY 7/19/21  Yes MD GERRI Marte PENTIPS 31G X 8 MM MISC USE WITH insulin pens 4/21/21  Yes César Upton MD   alirocumab (PRALUENT) 75 MG/ML SOAJ injection pen Inject 1 mL into the skin every 14 days 3/29/21  Yes CYRUS Jiang - CNP   torsemide (DEMADEX) 10 MG tablet Take 10 mg by mouth daily   Yes Historical Provider, MD   omeprazole (PRILOSEC) 20 MG delayed release capsule TAKE 1 CAPSULE BY MOUTH DAILY 3/5/21  Yes César Laughter, MD   aspirin 81 MG chewable tablet Take 1 tablet by mouth daily 12/24/20  Yes Damion Vidal MD   dicyclomine (BENTYL) 20 MG tablet Take 20 mg by mouth 4 times daily (before meals and nightly)  12/9/20  Yes Historical Provider, MD   nitroGLYCERIN (NITROSTAT) 0.4 MG SL tablet Place 1 tablet under the tongue every 5 minutes as needed for Chest pain up to max of 3 total doses. If no relief after 1 dose, call 911. 12/16/20  Yes César Upton MD   Nutritional Supplements (ENSURE) LIQD Take 1 Can by mouth 3 times daily as needed (nutrition) 12/16/20  Yes César Upton MD   atorvastatin (LIPITOR) 80 MG tablet Take 1 tablet by mouth nightly 10/1/20  Yes César Upton MD   amLODIPine (NORVASC) 5 MG tablet Take 1 tablet by mouth 2 times daily 10/1/20  Yes César Upton MD   blood glucose test strips (TRUE METRIX BLOOD GLUCOSE TEST) strip 1 each by Does not apply route 2 times daily 9/25/20  Yes César Upton MD   clopidogrel (PLAVIX) 75 MG tablet TAKE 1 TABLET BY MOUTH DA JULIEN 9/17/20  Yes César Upton MD   albuterol (PROVENTIL) (2.5 MG/3ML) 0.083% nebulizer solution Take 3 mLs by nebulization every 4 hours as needed for Wheezing 3/25/20  Yes César Upton MD   budesonide-formoterol (SYMBICORT) 160-4.5 MCG/ACT AERO Inhale 2 puffs into the lungs daily 12/6/19  Yes César Upton MD   albuterol sulfate HFA (VENTOLIN HFA) 108 (90 Base) MCG/ACT inhaler Inhale 2 puffs into the lungs every 4 hours as needed for Wheezing or Shortness of Breath 12/6/19  Yes César Upton MD   ranolazine (RANEXA) 1000 MG extended release tablet Take 1 tablet by mouth 2 times daily 9/10/19  Yes César Upton MD       Allergies:  Patient has no known allergies. Social History:  The patient currently lives alone. TOBACCO:   reports that she quit smoking about 3 years ago. Her smoking use included cigarettes. She has a 17.50 pack-year smoking history.  She has never used smokeless tobacco.  ETOH:   reports no history of alcohol pressure      CXR:  I have reviewed the CXR with the following interpretation: negative.,   EKG:  I have reviewed the EKG with the following interpretation: no ST, T changes. CBC   Recent Labs     10/18/21  0824   WBC 5.6   HGB 11.3*   HCT 35.0*         RENAL  Recent Labs     10/18/21  0824      K 4.3      CO2 19*   BUN 12   CREATININE 0.9     LFT'S  Recent Labs     10/18/21  0824   AST 46*   ALT 42*   BILITOT 0.3   ALKPHOS 134*     COAG  No results for input(s): INR in the last 72 hours. CARDIAC ENZYMES  Recent Labs     10/18/21  0824   TROPONINI <0.01       U/A:    Lab Results   Component Value Date    COLORU YELLOW 10/27/2020    WBCUA 7 10/27/2020    RBCUA 229 10/27/2020    MUCUS 1+ 05/23/2013    BACTERIA RARE 08/29/2019    CLARITYU CLOUDY 10/27/2020    SPECGRAV 1.022 10/27/2020    LEUKOCYTESUR TRACE 10/27/2020    BLOODU LARGE 10/27/2020    GLUCOSEU 250 10/27/2020    GLUCOSEU NEGATIVE 05/14/2012    AMORPHOUS Rare 12/11/2014       ABG    Lab Results   Component Value Date    DPS8JSD 16.6 07/28/2019    BEART -7.4 07/28/2019    Z1MUNUNJ 98.3 07/28/2019    PHART 7.367 07/28/2019    THGBART 10.9 11/16/2011    JHX1KWJ 29.6 07/28/2019    PO2ART 97.4 07/28/2019    DZN6FZV 17.5 07/28/2019           Active Hospital Problems    Diagnosis Date Noted    Chest pain [R07.9] 12/22/2020         PHYSICIANS CERTIFICATION:    I certify that Jani Mccoy is expected to be hospitalized for more than 2 midnights based on the following assessment and plan:      ASSESSMENT/PLAN:    Chest pain ( likely unstable angina). Extensive history of CAD. Patient presented to emergency with acute chest pain not relieved by nitroglycerin, patient has history of multiple admissions with chest pain, had extensive history of coronary disease with multiple stents, on Ranexa for angina.   Upon presentation patient's blood pressure was noted to be elevated, later normalized, blood work noted to be unremarkable, troponin

## 2021-10-18 NOTE — ACP (ADVANCE CARE PLANNING)
minutes:      Conversation Outcomes:  [x] ACP discussion completed  [x] Existing advance directive reviewed with patient; no changes to patient's previously recorded wishes  [] New Advance Directive completed  [] Portable Do Not Rescitate prepared for Provider review and signature  [] POLST/POST/MOLST/MOST prepared for Provider review and signature      Follow-up plan:    [] Schedule follow-up conversation to continue planning  [] Referred individual to Provider for additional questions/concerns   [] Advised patient/agent/surrogate to review completed ACP document and update if needed with changes in condition, patient preferences or care setting    [x] This note routed to one or more involved healthcare providers

## 2021-10-18 NOTE — ED NOTES
Pt to ED via [facility] with c/o left side chest pain that radiates down her left arm. Pt states the pain started approx an hour and a half prior to ED arrival.  Pt states she took 0.4 mg of SL nitro x3 and 324 mg asa prior to ED arrival with minimal relief. Hx of MI. Pt alert and oriented x4 upon ED arrival, sitting upright in stretcher. Pt placed on cardiac monitor and EKG obtained.       Janee Escamilla RN  10/18/21 [id number]

## 2021-10-18 NOTE — ED PROVIDER NOTES
[address] [facility]  [address] [address]  Dept: [phone]  Loc: [address] ENCOUNTER        This patient was not seen or evaluated by the attending physician. I evaluated this patient, the attending physician was available for consultation. CHIEF COMPLAINT    Chief Complaint   Patient presents with    Chest Pain     Came to the ED with complaint of left sided chest pain started 1hr ago. pt took 3 nitro and 1 tablet of 324mg of aspirin with minimal relief hx of MI       HPI    Guero Colin is a 60 y.o. female who presents with chest pain. The onset was this morning when she woke up. It actually woke her up out of her sleep. The duration has been constant since the onset. The quality of the pain is a crushing left chest pain, with a severity of 10/10. The pain is localized in the left chest, does radiate to her back and down her left arm. Does not worsen with exertion. But at the start of onset she did have diaphoresis with nausea and vomiting. Positive shortness of breath associated with this but no cough. The context is that the symptoms started spontaneously at rest, woke her up out of her sleep. The patient took 324 mg of aspirin and 1 nitroglycerin and called the squad. While waiting for the squad she took 2 more nitroglycerin. Some minimal relief with this. Was brought to the ED for further evaluation and treatment. REVIEW OF SYSTEMS    Cardiac: see HPI, no syncope  Respiratory: + shortness of breath, no cough, no hemoptysis  GI: No vomiting or diarrhea  : No dysuria or hematuria  General: No fever or chills  All other systems reviewed and are negative.     PAST MEDICAL & SURGICAL HISTORY    Past Medical History:   Diagnosis Date    Acid reflux     Anemia     Anxiety and depression     Arthritis     Asthma     Atrial fibrillation ([address]     CAD (coronary artery disease) 12/3/2012    Cerebral artery occlusion with cerebral infarction [facility])     TIA\"\"S--right sided weakness & headache    CHF (congestive heart failure) (HCC)     Chronic kidney disease--stage III     40% kidney function    COPD (chronic obstructive pulmonary disease) ([facility])     DM2 (diabetes mellitus, type 2) ([facility]     Dysarthria     Fibromyalgia 6/7/2016    Headache(784.0) 2/19/2013    Hemisensory loss     History of blood transfusion 11/2020    pt denies having transfusion reaction    Hyperlipidemia     Hypertension     IBS (irritable bowel syndrome)     Inferior vena cava occlusion (HCC)     Keratitis     MI, old     Neuropathy     Superior vena cava obstruction     Temporal arteritis ([facility] [address] 2/24/2014    Wears glasses      Past Surgical History:   Procedure Laterality Date    ABLATION OF DYSRHYTHMIC FOCUS  1999  and 11/2020    times 2    ARTERY BIOPSY Right 04/23/2014    RIGHT TEMPORAL ARTERY BIOPSY    CAROTID ARTERY SURGERY Left     clean up per pt    CATARACT REMOVAL Bilateral     CHOLECYSTECTOMY      COLONOSCOPY N/A 4/9/2021    COLONOSCOPY WITH BIOPSY performed by Jasen Arndt MD at [address] [facility] 10/15/2021    COLONOSCOPY performed by Jasen Arndt MD at [facility] 19 2020    HYSTERECTOMY      JOINT REPLACEMENT Right     KNEE ARTHROSCOPY Right     PTCA  10/2019    LAD and RCA inrtervention    TUNNELED VENOUS PORT PLACEMENT      left thigh.   SMART PORT-----Removed--total of 4 port placement and removal    UPPER GASTROINTESTINAL ENDOSCOPY N/A 7/6/2020    EGD DIAGNOSTIC ONLY performed by Ramon Yu MD at [address] [facility]  (may include discharge medications prescribed in the ED)  Current Outpatient Rx   Medication Sig Dispense Refill    linaclotide (LINZESS) 145 MCG capsule Take 1 capsule by mouth every morning (before breakfast) 30 capsule 5    metoprolol succinate (TOPROL XL) 50 MG extended release tablet Take 50 mg by mouth nightly      metoprolol succinate (TOPROL XL) 50 MG extended release tablet TAKE 0.5 TABLETS BY MOUTH 2 TIMES DAILY (WITH MEALS) (Patient taking differently: Take 25 mg by mouth See Admin Instructions Breakfast and lunch) 30 tablet 5    Ubrogepant (UBRELVY) 50 MG TABS TAKE 1 TABLET BY MOUTH AS NEEDED AT START OF HEADACHES, CAN REPEAT IN 24 HOURS 10 tablet 0    DULoxetine (CYMBALTA) 60 MG extended release capsule Take 1 capsule by mouth daily 30 capsule 1    oxyCODONE-acetaminophen (PERCOCET) 7.5-325 MG per tablet Take 1 tablet by mouth every 6 hours as needed for Pain (max 3 day) for up to 28 days.  84 tablet 0    QUEtiapine (SEROQUEL) 25 MG tablet Take 1 tablet by mouth nightly 60 tablet 1    tiZANidine (ZANAFLEX) 4 MG tablet Take 0.5-1 tablets by mouth 2 times daily as needed (muscle spasms) 60 tablet 0    Erenumab-aooe 140 MG/ML SOAJ Inject 140 mLs into the skin every 30 days 1 pen 1    insulin aspart (NOVOLOG FLEXPEN) 100 UNIT/ML injection pen Inject 3 Units into the skin 3 times daily (before meals) 5 pen 2    insulin glargine (BASAGLAR KWIKPEN) 100 UNIT/ML injection pen Inject 8 Units into the skin 2 times daily 5 pen 3    ondansetron (ZOFRAN) 4 MG tablet Take 1 tablet by mouth every 8 hours as needed for Nausea or Vomiting 21 tablet 1    ULTICARE MINI PEN NEEDLES 31G X 6 MM MISC USE WITH INSULINS FOUR TIMES A  each 0    UNIFINE PENTIPS 31G X 8 MM MISC USE WITH insulin pens 100 each 5    alirocumab (PRALUENT) 75 MG/ML SOAJ injection pen Inject 1 mL into the skin every 14 days 6 pen 3    torsemide (DEMADEX) 10 MG tablet Take 10 mg by mouth daily      omeprazole (PRILOSEC) 20 MG delayed release capsule TAKE 1 CAPSULE BY MOUTH DAILY 30 capsule 1    aspirin 81 MG chewable tablet Take 1 tablet by mouth daily 30 tablet 3    dicyclomine (BENTYL) 20 MG tablet Take 20 mg by mouth 4 times daily (before meals and nightly)       nitroGLYCERIN (NITROSTAT) 0.4 MG SL tablet Place 1 tablet under the tongue every 5 minutes as needed for Chest pain up to max of 3 total doses. If no relief after 1 dose, call 911. 25 tablet 3    Nutritional Supplements (ENSURE) LIQD Take 1 Can by mouth 3 times daily as needed (nutrition) 90 Can 5    atorvastatin (LIPITOR) 80 MG tablet Take 1 tablet by mouth nightly 90 tablet 5    amLODIPine (NORVASC) 5 MG tablet Take 1 tablet by mouth 2 times daily 180 tablet 5    blood glucose test strips (TRUE METRIX BLOOD GLUCOSE TEST) strip 1 each by Does not apply route 2 times daily 100 each 5    clopidogrel (PLAVIX) 75 MG tablet TAKE 1 TABLET BY MOUTH DA JULIEN 90 tablet 5    albuterol (PROVENTIL) (2.5 MG/3ML) 0.083% nebulizer solution Take 3 mLs by nebulization every 4 hours as needed for Wheezing 120 each 5    budesonide-formoterol (SYMBICORT) 160-4.5 MCG/ACT AERO Inhale 2 puffs into the lungs daily 2 Inhaler 5    albuterol sulfate HFA (VENTOLIN HFA) 108 (90 Base) MCG/ACT inhaler Inhale 2 puffs into the lungs every 4 hours as needed for Wheezing or Shortness of Breath 3 Inhaler 5    ranolazine (RANEXA) 1000 MG extended release tablet Take 1 tablet by mouth 2 times daily 60 tablet 8       ALLERGIES    No Known Allergies    SOCIAL & FAMILY HISTORY    Social History     Socioeconomic History    Marital status:       Spouse name: None    Number of children: 6    Years of education: None    Highest education level: None   Occupational History    None   Tobacco Use    Smoking status: Former Smoker     Packs/day: 0.50     Years: 35.00     Pack years: 17.50     Types: Cigarettes     Quit date: 7/1/2018     Years since quitting: 3.3    Smokeless tobacco: Never Used    Tobacco comment: 5/13/15 has not smoked since hospitalization - kh   Vaping Use    Vaping Use: Never used   Substance and Sexual Activity    Alcohol use: No     Alcohol/week: 0.0 standard drinks    Drug use: Never    Sexual activity: Not Currently     Partners: Male Other Topics Concern    None   Social History Narrative    None     Social Determinants of Health     Financial Resource Strain:     Difficulty of Paying Living Expenses:    Food Insecurity:     Worried About Running Out of Food in the Last Year:     [address] in the Last Year:    Transportation Needs:     Lack of Transportation (Medical):      Lack of Transportation (Non-Medical):    Physical Activity:     Days of Exercise per Week:     Minutes of Exercise per Session:    Stress:     Feeling of Stress :    Social Connections:     Frequency of Communication with Friends and Family:     Frequency of Social Gatherings with Friends and Family:     Attends Che[employer] Services:     Active Member of Clubs or Organizations:     Attends Club or Organization Meetings:     Marital Status:    Intimate Partner Violence:     Fear of Current or Ex-Partner:     Emotionally Abused:     Physically Abused:     Sexually Abused:      Family History   Problem Relation Age of Onset    Diabetes Mother     High Cholesterol Mother     Stroke Mother     Cancer Mother     High Blood Pressure Mother     No Known Problems Paternal Grandfather         lung issues        PHYSICAL EXAM    VITAL SIGNS: BP (!) 170/72   Pulse 65   Temp 98.3 °F (36.8 °C) (Oral)   Resp 20   Ht 5' (1.524 m)   Wt 118 lb 9.7 oz (53.8 kg)   SpO2 99%   BMI 23.16 kg/m²    Constitutional:  Well developed, well nourished, no acute distress   HENT:  Atraumatic, moist mucus membranes  Neck: supple, no JVD   Respiratory:  Lungs clear to auscultation bilaterally, no retractions   Cardiovascular:  regular rate, no murmurs  Vascular: Radial and DP pulses 2+ and equal bilaterally  GI:  Soft, nontender, normal bowel sounds  Musculoskeletal:  no lower extremity edema, no lower extremity asymmetry, no calf tenderness, no thigh tenderness, no acute deformities  Integument:  Skin warm and dry, no petechiae   Neurologic:  Alert & oriented, no slurred speech  Psych: Pleasant affect, no hallucinations          EKG    Please see the physician note for EKG interpretation. Sinus rhythm with PVC.   Ventricular rate of 82, QTc 450     LABS  Results for orders placed or performed during the hospital encounter of 10/18/21   CBC Auto Differential   Result Value Ref Range    WBC 5.6 4.0 - 11.0 K/uL    RBC 3.58 (L) 4.00 - 5.20 M/uL    Hemoglobin 11.3 (L) 12.0 - 16.0 g/dL    Hematocrit 35.0 (L) 36.0 - 48.0 %    MCV 97.7 80.0 - 100.0 fL    MCH 31.7 26.0 - 34.0 pg    MCHC 32.4 31.0 - 36.0 g/dL    RDW 14.9 12.4 - 15.4 %    Platelets 367 334 - 994 K/uL    MPV 8.6 5.0 - 10.5 fL    PLATELET SLIDE REVIEW Adequate     SLIDE REVIEW see below     Neutrophils % 72.7 %    Lymphocytes % 20.4 %    Monocytes % 3.9 %    Eosinophils % 2.4 %    Basophils % 0.6 %    Neutrophils Absolute 4.0 1.7 - 7.7 K/uL    Lymphocytes Absolute 1.1 1.0 - 5.1 K/uL    Monocytes Absolute 0.2 0.0 - 1.3 K/uL    Eosinophils Absolute 0.1 0.0 - 0.6 K/uL    Basophils Absolute 0.0 0.0 - 0.2 K/uL    Vacuolated Neutrophils Present (A)     RBC Morphology Normal    Comprehensive Metabolic Panel w/ Reflex to MG   Result Value Ref Range    Sodium 138 136 - 145 mmol/L    Potassium reflex Magnesium 4.3 3.5 - 5.1 mmol/L    Chloride 104 99 - 110 mmol/L    CO2 19 (L) 21 - 32 mmol/L    Anion Gap 15 3 - 16    Glucose 180 (H) 70 - 99 mg/dL    BUN 12 7 - 20 mg/dL    CREATININE 0.9 0.6 - 1.2 mg/dL    GFR Non-African American >60 >60    GFR African American >60 >60    Calcium 9.6 8.3 - 10.6 mg/dL    Total Protein 7.6 6.4 - 8.2 g/dL    Albumin 3.6 3.4 - 5.0 g/dL    Albumin/Globulin Ratio 0.9 (L) 1.1 - 2.2    Total Bilirubin 0.3 0.0 - 1.0 mg/dL    Alkaline Phosphatase 134 (H) 40 - 129 U/L    ALT 42 (H) 10 - 40 U/L    AST 46 (H) 15 - 37 U/L    Globulin 4.0 Not Established g/dL   Lipase   Result Value Ref Range    Lipase 35.0 13.0 - 60.0 U/L   Brain Natriuretic Peptide   Result Value Ref Range    Pro-BNP 1,421 (H) 0 - 124 pg/mL   Troponin Result Value Ref Range    Troponin <0.01 <0.01 ng/mL   EKG 12 Lead   Result Value Ref Range    Ventricular Rate 82 BPM    Atrial Rate 82 BPM    P-R Interval 124 ms    QRS Duration 72 ms    Q-T Interval 386 ms    QTc Calculation (Bazett) 450 ms    P Axis 79 degrees    R Axis 39 degrees    T Axis 89 degrees    Diagnosis       Sinus rhythm with occasional Premature ventricular complexesPossible Left atrial enlargementSeptal infarct (cited on or before 20-JUN-2021)Abnormal ECGWhen compared with ECG of 30-JUL-2021 15:34,Premature ventricular complexes are now PresentPR interval has increased         RADIOLOGY/PROCEDURES    CT CHEST PULMONARY EMBOLISM W CONTRAST   Final Result   1. Negative for pulmonary embolus. 2. Mild bilateral pulmonary fibrosis. 3. Chronic SVC occlusion with associated venous collateralization. XR CHEST (2 VW)   Final Result   No acute process. ED COURSE & MEDICAL DECISION MAKING    Pertinent Labs & Imaging studies reviewed and interpreted. (See chart for details)  The patient was immediately placed on the cardiac monitor. IV access obtained. ASA was not ordered as she took 324 mg prior to arrival. NTG paste ordered. See chart for details of medications given during the ED stay. Vitals:    10/18/21 0845 10/18/21 0915 10/18/21 0947 10/18/21 1030   BP: (!) 174/63 (!) 166/61 (!) 178/59 (!) 170/72   Pulse: 60 60 69 65   Resp: 13 17 27 20   Temp:       TempSrc:       SpO2: 100% 100% 100% 99%   Weight:       Height:           Differential Diagnosis: Acute Coronary Syndrome, Congestive Heart Failure, Thoracic Dissection, Pericarditis, Pericardial Effusion, Pulmonary Embolism, Pneumonia, Pneumothorax, Ischemic Bowel, Bowel Obstruction, PUD, GERD, Acute Cholecystitis, Pancreatitis, Hepatitis, Colitis, other    CRITICAL CARE NOTE:  There was a high probability of clinically significant life-threatening deterioration of the patient's condition requiring my urgent intervention. Total critical care time was at least 10 minutes. This includes vital sign monitoring, pulse oximetry monitoring, telemetry monitoring, clinical response to the IV medications, reviewing the nursing notes, consultation time, dictation/documentation time, and interpretation of the labwork. This excludes any separately billable procedures performed. Patient is afebrile and nontoxic in appearance. Labs reveal no leukocytosis or anemia. Metabolic panel unremarkable. CXR findings as above. EKG interpreted by physician. Troponin negative. Patient's HEART score is 5    CTA rule out PE as read by the radiologist as above. Reevaluation at 1: Patient is still looking uncomfortable. Stating that she is still a 8 out of 10 far as pain. She has had a total of 8 mg of morphine and 3 nitroglycerin sublingually with nitro paste in place. Given her concerning story, high heart score and intractable pain I do believe she needs to be admitted for further evaluation and treatment. I therefore consulted the hospitalist via perfect serve who agreed to admit patient and write orders for admission      FINAL IMPRESSION    1. Chest pain, unspecified type    2. Shortness of breath    3.  Chronic superior vena cava occlusion          PLAN  Admission to the hospital    (Please note that this note was completed with a voice recognition program.  Every attempt was made to edit the dictations, but inevitably there remain words that are mis-transcribed.)        CYRUS Desai - CNP  10/18/21 7857

## 2021-10-18 NOTE — PROGRESS NOTES
Pt admitted to room 4124 from ED. Pt alert and oriented. VSS. Pt c/o chest pain 6/10, medication was given prior to transfer to unit. Tele monitor in place. Pt denies all other needs a this time. Call light in reach. Bed alarm on. Will continue to monitor.

## 2021-10-18 NOTE — ED NOTES
Bed: A-15  Expected date:   Expected time:   Means of arrival: [facility]  Comments:  Chest pain     Timmy Carreon RN  10/18/21 2443

## 2021-10-19 LAB
ANION GAP SERPL CALCULATED.3IONS-SCNC: 12 MMOL/L (ref 3–16)
BACTERIA: ABNORMAL /HPF
BASOPHILS ABSOLUTE: 0 K/UL (ref 0–0.2)
BASOPHILS RELATIVE PERCENT: 0.4 %
BILIRUBIN URINE: ABNORMAL
BLOOD, URINE: NEGATIVE
BUN BLDV-MCNC: 18 MG/DL (ref 7–20)
CALCIUM SERPL-MCNC: 8.7 MG/DL (ref 8.3–10.6)
CHLORIDE BLD-SCNC: 105 MMOL/L (ref 99–110)
CLARITY: CLEAR
CO2: 21 MMOL/L (ref 21–32)
COLOR: ABNORMAL
CREAT SERPL-MCNC: 1.8 MG/DL (ref 0.6–1.2)
EKG ATRIAL RATE: 82 BPM
EKG DIAGNOSIS: NORMAL
EKG P AXIS: 79 DEGREES
EKG P-R INTERVAL: 124 MS
EKG Q-T INTERVAL: 386 MS
EKG QRS DURATION: 72 MS
EKG QTC CALCULATION (BAZETT): 450 MS
EKG R AXIS: 39 DEGREES
EKG T AXIS: 89 DEGREES
EKG VENTRICULAR RATE: 82 BPM
EOSINOPHILS ABSOLUTE: 0.1 K/UL (ref 0–0.6)
EOSINOPHILS RELATIVE PERCENT: 3.5 %
EPITHELIAL CELLS, UA: 2 /HPF (ref 0–5)
ESTIMATED AVERAGE GLUCOSE: 177.2 MG/DL
GFR AFRICAN AMERICAN: 34
GFR NON-AFRICAN AMERICAN: 28
GLUCOSE BLD-MCNC: 107 MG/DL (ref 70–99)
GLUCOSE BLD-MCNC: 136 MG/DL (ref 70–99)
GLUCOSE BLD-MCNC: 152 MG/DL (ref 70–99)
GLUCOSE BLD-MCNC: 78 MG/DL (ref 70–99)
GLUCOSE BLD-MCNC: 96 MG/DL (ref 70–99)
GLUCOSE URINE: NEGATIVE MG/DL
HBA1C MFR BLD: 7.8 %
HCT VFR BLD CALC: 30.7 % (ref 36–48)
HEMOGLOBIN: 9.8 G/DL (ref 12–16)
HYALINE CASTS: 4 /LPF (ref 0–8)
KETONES, URINE: NEGATIVE MG/DL
LEUKOCYTE ESTERASE, URINE: ABNORMAL
LYMPHOCYTES ABSOLUTE: 0.9 K/UL (ref 1–5.1)
LYMPHOCYTES RELATIVE PERCENT: 20.6 %
MCH RBC QN AUTO: 31 PG (ref 26–34)
MCHC RBC AUTO-ENTMCNC: 31.7 G/DL (ref 31–36)
MCV RBC AUTO: 97.7 FL (ref 80–100)
MICROSCOPIC EXAMINATION: YES
MONOCYTES ABSOLUTE: 0.3 K/UL (ref 0–1.3)
MONOCYTES RELATIVE PERCENT: 7 %
NEUTROPHILS ABSOLUTE: 2.9 K/UL (ref 1.7–7.7)
NEUTROPHILS RELATIVE PERCENT: 68.5 %
NITRITE, URINE: NEGATIVE
PDW BLD-RTO: 14.9 % (ref 12.4–15.4)
PERFORMED ON: ABNORMAL
PERFORMED ON: NORMAL
PH UA: 5 (ref 5–8)
PLATELET # BLD: 167 K/UL (ref 135–450)
PMV BLD AUTO: 8.1 FL (ref 5–10.5)
POTASSIUM SERPL-SCNC: 4.1 MMOL/L (ref 3.5–5.1)
PROTEIN UA: 30 MG/DL
RBC # BLD: 3.15 M/UL (ref 4–5.2)
RBC UA: 3 /HPF (ref 0–4)
SODIUM BLD-SCNC: 138 MMOL/L (ref 136–145)
SPECIFIC GRAVITY UA: >1.03 (ref 1–1.03)
URINE TYPE: ABNORMAL
UROBILINOGEN, URINE: 1 E.U./DL
WBC # BLD: 4.2 K/UL (ref 4–11)
WBC UA: 2 /HPF (ref 0–5)

## 2021-10-19 PROCEDURE — 93010 ELECTROCARDIOGRAM REPORT: CPT | Performed by: INTERNAL MEDICINE

## 2021-10-19 PROCEDURE — 2580000003 HC RX 258: Performed by: STUDENT IN AN ORGANIZED HEALTH CARE EDUCATION/TRAINING PROGRAM

## 2021-10-19 PROCEDURE — 94761 N-INVAS EAR/PLS OXIMETRY MLT: CPT

## 2021-10-19 PROCEDURE — 6370000000 HC RX 637 (ALT 250 FOR IP): Performed by: INTERNAL MEDICINE

## 2021-10-19 PROCEDURE — 81001 URINALYSIS AUTO W/SCOPE: CPT

## 2021-10-19 PROCEDURE — 80048 BASIC METABOLIC PNL TOTAL CA: CPT

## 2021-10-19 PROCEDURE — 36415 COLL VENOUS BLD VENIPUNCTURE: CPT

## 2021-10-19 PROCEDURE — 1200000000 HC SEMI PRIVATE

## 2021-10-19 PROCEDURE — 85025 COMPLETE CBC W/AUTO DIFF WBC: CPT

## 2021-10-19 PROCEDURE — 94640 AIRWAY INHALATION TREATMENT: CPT

## 2021-10-19 PROCEDURE — 6370000000 HC RX 637 (ALT 250 FOR IP): Performed by: STUDENT IN AN ORGANIZED HEALTH CARE EDUCATION/TRAINING PROGRAM

## 2021-10-19 PROCEDURE — 6360000002 HC RX W HCPCS: Performed by: STUDENT IN AN ORGANIZED HEALTH CARE EDUCATION/TRAINING PROGRAM

## 2021-10-19 RX ORDER — OXYCODONE AND ACETAMINOPHEN 7.5; 325 MG/1; MG/1
1 TABLET ORAL EVERY 6 HOURS PRN
Status: DISCONTINUED | OUTPATIENT
Start: 2021-10-19 | End: 2021-10-23 | Stop reason: HOSPADM

## 2021-10-19 RX ORDER — METOPROLOL SUCCINATE 50 MG/1
50 TABLET, EXTENDED RELEASE ORAL DAILY
Status: DISCONTINUED | OUTPATIENT
Start: 2021-10-20 | End: 2021-10-23 | Stop reason: HOSPADM

## 2021-10-19 RX ADMIN — OXYCODONE AND ACETAMINOPHEN 1 TABLET: 7.5; 325 TABLET ORAL at 20:51

## 2021-10-19 RX ADMIN — ENOXAPARIN SODIUM 40 MG: 40 INJECTION SUBCUTANEOUS at 08:52

## 2021-10-19 RX ADMIN — INSULIN GLARGINE 5 UNITS: 100 INJECTION, SOLUTION SUBCUTANEOUS at 08:53

## 2021-10-19 RX ADMIN — RANOLAZINE 1000 MG: 500 TABLET, FILM COATED, EXTENDED RELEASE ORAL at 08:52

## 2021-10-19 RX ADMIN — SODIUM CHLORIDE, PRESERVATIVE FREE 10 ML: 5 INJECTION INTRAVENOUS at 08:52

## 2021-10-19 RX ADMIN — RANOLAZINE 1000 MG: 500 TABLET, FILM COATED, EXTENDED RELEASE ORAL at 20:51

## 2021-10-19 RX ADMIN — QUETIAPINE FUMARATE 25 MG: 25 TABLET ORAL at 20:51

## 2021-10-19 RX ADMIN — ASPIRIN 81 MG CHEWABLE TABLET 81 MG: 81 TABLET CHEWABLE at 08:52

## 2021-10-19 RX ADMIN — CLOPIDOGREL BISULFATE 75 MG: 75 TABLET ORAL at 08:52

## 2021-10-19 RX ADMIN — TORSEMIDE 10 MG: 20 TABLET ORAL at 08:52

## 2021-10-19 RX ADMIN — Medication 2 PUFF: at 08:36

## 2021-10-19 RX ADMIN — INSULIN LISPRO 1 UNITS: 100 INJECTION, SOLUTION INTRAVENOUS; SUBCUTANEOUS at 12:12

## 2021-10-19 RX ADMIN — AMLODIPINE BESYLATE 5 MG: 5 TABLET ORAL at 08:52

## 2021-10-19 RX ADMIN — SODIUM CHLORIDE, PRESERVATIVE FREE 10 ML: 5 INJECTION INTRAVENOUS at 20:54

## 2021-10-19 RX ADMIN — OXYCODONE AND ACETAMINOPHEN 1 TABLET: 7.5; 325 TABLET ORAL at 08:52

## 2021-10-19 RX ADMIN — OXYCODONE AND ACETAMINOPHEN 1 TABLET: 7.5; 325 TABLET ORAL at 00:08

## 2021-10-19 RX ADMIN — INSULIN GLARGINE 5 UNITS: 100 INJECTION, SOLUTION SUBCUTANEOUS at 20:51

## 2021-10-19 RX ADMIN — DULOXETINE HYDROCHLORIDE 60 MG: 60 CAPSULE, DELAYED RELEASE ORAL at 08:52

## 2021-10-19 RX ADMIN — ATORVASTATIN CALCIUM 80 MG: 80 TABLET, FILM COATED ORAL at 20:50

## 2021-10-19 RX ADMIN — PANTOPRAZOLE SODIUM 40 MG: 40 TABLET, DELAYED RELEASE ORAL at 06:13

## 2021-10-19 RX ADMIN — OXYCODONE AND ACETAMINOPHEN 1 TABLET: 7.5; 325 TABLET ORAL at 14:12

## 2021-10-19 ASSESSMENT — PAIN SCALES - GENERAL
PAINLEVEL_OUTOF10: 8
PAINLEVEL_OUTOF10: 0
PAINLEVEL_OUTOF10: 8
PAINLEVEL_OUTOF10: 8
PAINLEVEL_OUTOF10: 0
PAINLEVEL_OUTOF10: 6
PAINLEVEL_OUTOF10: 0
PAINLEVEL_OUTOF10: 0
PAINLEVEL_OUTOF10: 8
PAINLEVEL_OUTOF10: 0

## 2021-10-19 ASSESSMENT — PAIN DESCRIPTION - LOCATION
LOCATION: CHEST
LOCATION: CHEST;HEAD

## 2021-10-19 ASSESSMENT — PAIN DESCRIPTION - ONSET
ONSET: ON-GOING

## 2021-10-19 ASSESSMENT — PAIN DESCRIPTION - PAIN TYPE
TYPE: ACUTE PAIN

## 2021-10-19 ASSESSMENT — PAIN DESCRIPTION - DESCRIPTORS
DESCRIPTORS: HEAVINESS
DESCRIPTORS: HEAVINESS
DESCRIPTORS: ACHING;DISCOMFORT;SHARP;PRESSURE
DESCRIPTORS: HEAVINESS
DESCRIPTORS: ACHING

## 2021-10-19 ASSESSMENT — PAIN DESCRIPTION - ORIENTATION
ORIENTATION: MID

## 2021-10-19 ASSESSMENT — PAIN - FUNCTIONAL ASSESSMENT
PAIN_FUNCTIONAL_ASSESSMENT: PREVENTS OR INTERFERES SOME ACTIVE ACTIVITIES AND ADLS
PAIN_FUNCTIONAL_ASSESSMENT: ACTIVITIES ARE NOT PREVENTED
PAIN_FUNCTIONAL_ASSESSMENT: PREVENTS OR INTERFERES SOME ACTIVE ACTIVITIES AND ADLS

## 2021-10-19 ASSESSMENT — PAIN DESCRIPTION - PROGRESSION
CLINICAL_PROGRESSION: NOT CHANGED

## 2021-10-19 ASSESSMENT — PAIN DESCRIPTION - FREQUENCY
FREQUENCY: INTERMITTENT
FREQUENCY: CONTINUOUS

## 2021-10-19 ASSESSMENT — PAIN DESCRIPTION - DIRECTION: RADIATING_TOWARDS: MID

## 2021-10-19 NOTE — PROGRESS NOTES
Hospitalist Progress Note      PCP: Tej Johnson MD    Date of Admission: 10/18/2021    Chief Complaint: chest pain awakened from sleep       Subjective:     Ongoing left sided chest pain 8/10 intensity - out of proportion to exam.   No pleurisy. Radiates along left jaw and to the back. Medications:  Reviewed    Infusion Medications    sodium chloride      dextrose       Scheduled Medications    [START ON 10/20/2021] metoprolol succinate  50 mg Oral Daily    aspirin  81 mg Oral Daily    atorvastatin  80 mg Oral Nightly    budesonide-formoterol  2 puff Inhalation Daily    clopidogrel  75 mg Oral Daily    DULoxetine  60 mg Oral Daily    insulin glargine  5 Units SubCUTAneous BID    pantoprazole  40 mg Oral QAM AC    QUEtiapine  25 mg Oral Nightly    ranolazine  1,000 mg Oral BID    torsemide  10 mg Oral Daily    sodium chloride flush  5-40 mL IntraVENous 2 times per day    enoxaparin  40 mg SubCUTAneous Daily    insulin lispro  0-6 Units SubCUTAneous TID WC    insulin lispro  0-3 Units SubCUTAneous Nightly     PRN Meds: oxyCODONE-acetaminophen, nitroGLYCERIN, tiZANidine, sodium chloride flush, sodium chloride, ondansetron **OR** ondansetron, polyethylene glycol, acetaminophen **OR** acetaminophen, nitroGLYCERIN, glucose, dextrose, glucagon (rDNA), dextrose      Intake/Output Summary (Last 24 hours) at 10/19/2021 1417  Last data filed at 10/19/2021 0944  Gross per 24 hour   Intake 480 ml   Output 450 ml   Net 30 ml       Physical Exam Performed:    BP (!) 103/57   Pulse 50   Temp 97.3 °F (36.3 °C) (Oral)   Resp 16   Ht 5' (1.524 m)   Wt 119 lb 0.8 oz (54 kg)   SpO2 99%   BMI 23.25 kg/m²     General appearance: No apparent distress, appears stated age and cooperative. HEENT: Pupils equal, round, and reactive to light. Conjunctivae/corneas clear. Neck: Supple, with full range of motion. No jugular venous distention. Trachea midline.   Respiratory:  Normal respiratory effort. Clear to auscultation, bilaterally without Rales/Wheezes/Rhonchi. Cardiovascular: Regular rate and rhythm with normal S1/S2 without murmurs, rubs or gallops. Abdomen: Soft, non-tender, non-distended with normal bowel sounds. Musculoskeletal: No clubbing, cyanosis or edema bilaterally. Full range of motion without deformity. Skin: Skin color, texture, turgor normal.  No rashes or lesions. Neurologic:  Neurovascularly intact without any focal sensory/motor deficits. Cranial nerves: II-XII intact, grossly non-focal.  Psychiatric: Alert and oriented, thought content appropriate, normal insight  Capillary Refill: Brisk,3 seconds, normal   Peripheral Pulses: +2 palpable, equal bilaterally       Labs:   Recent Labs     10/18/21  0824 10/19/21  0754   WBC 5.6 4.2   HGB 11.3* 9.8*   HCT 35.0* 30.7*    167     Recent Labs     10/18/21  0824 10/19/21  0754    138   K 4.3 4.1    105   CO2 19* 21   BUN 12 18   CREATININE 0.9 1.8*   CALCIUM 9.6 8.7     Recent Labs     10/18/21  0824   AST 46*   ALT 42*   BILITOT 0.3   ALKPHOS 134*     No results for input(s): INR in the last 72 hours. Recent Labs     10/18/21  0824 10/18/21  1543   TROPONINI <0.01 <0.01       Urinalysis:      Lab Results   Component Value Date    NITRU Negative 10/27/2020    WBCUA 7 10/27/2020    BACTERIA RARE 08/29/2019    RBCUA 229 10/27/2020    BLOODU LARGE 10/27/2020    SPECGRAV 1.022 10/27/2020    GLUCOSEU 250 10/27/2020    GLUCOSEU NEGATIVE 05/14/2012       Radiology:  CT CHEST PULMONARY EMBOLISM W CONTRAST   Final Result   1. Negative for pulmonary embolus. 2. Mild bilateral pulmonary fibrosis. 3. Chronic SVC occlusion with associated venous collateralization. XR CHEST (2 VW)   Final Result   No acute process. Assessment/Plan:    Active Hospital Problems    Diagnosis     Chest pain [R07.9]      Chest pain - non anginal  Extensive history of CAD and prior PTCA stenting.     on Ranexa for angina. Most recent stress test in June was negative. - serial trop - negative. - EKG with no new ST-T changes.     - Cardiology to eval.    - CTA - no PE, chronic SVC occlusion with collateralization. KOLBY not POA. Possibly related to hemodynamic shifts of Labile BP. Elevated on presentation - low normal today. Stop amlodipine. Decreased dose BB. Check UA         Diastolic CHF. Patient is not in exacerbation. Resume home dose of torsemide 10 mg.       Atrial fibrillation. Continue metoprolol - dose changed as above. Patient is not on anticoagulation due to Hx of GI bleed.        Diabetes mellitus. Reduced home glargine dose. Glargine 5 units with sliding scale       Depression. Continue home dose of quetiapine  Continue Cymbalta       GERD   Continue pantoprazole       DVT Prophylaxis: Lovenox/   Diet: ADULT DIET; Regular; 4 carb choices (60 gm/meal);  Low Sodium (2 gm); 1200 ml  Code Status: Full Code  PT/OT Eval Status: pending clinical stability.          Dispo - pending clinical improvement (patient lives alone in a senior living apartment)         Gregorio Jin MD

## 2021-10-19 NOTE — PLAN OF CARE
Problem: Pain:  Description: Pain management should include both nonpharmacologic and pharmacologic interventions.   Goal: Pain level will decrease  Description: Pain level will decrease  10/19/2021 0027 by Ulysses Deans, RN  Outcome: Ongoing  10/19/2021 0025 by Ulysses Deans, RN  Outcome: Ongoing  10/19/2021 0023 by Ulysses Deans, RN  Outcome: Ongoing  10/18/2021 1808 by Yanni Polanco RN  Outcome: Ongoing  Goal: Control of acute pain  Description: Control of acute pain  10/19/2021 0027 by Ulysses Deans, RN  Outcome: Ongoing  10/19/2021 0025 by Ulysses Deans, RN  Outcome: Ongoing  10/19/2021 0023 by Ulysses Deans, RN  Outcome: Ongoing  Goal: Control of chronic pain  Description: Control of chronic pain  10/19/2021 0027 by Ulysses Deans, RN  Outcome: Ongoing  10/19/2021 0025 by Ulysses Deans, RN  Outcome: Ongoing  10/19/2021 0023 by Ulysses Deans, RN  Outcome: Ongoing     Problem: Falls - Risk of:  Goal: Will remain free from falls  Description: Will remain free from falls  10/19/2021 0027 by Ulysses Deans, RN  Outcome: Ongoing  10/19/2021 0025 by Ulysses Deans, RN  Outcome: Ongoing  10/19/2021 0023 by Ulysses Deans, RN  Outcome: Ongoing  Goal: Absence of physical injury  Description: Absence of physical injury  10/19/2021 0027 by Ulysses Deans, RN  Outcome: Ongoing  10/19/2021 0025 by Ulysses Deans, RN  Outcome: Ongoing  10/19/2021 0023 by Ulysses Deans, RN  Outcome: Ongoing

## 2021-10-19 NOTE — PLAN OF CARE
Problem: Pain:  Goal: Pain level will decrease  Description: Pain level will decrease  10/19/2021 1015 by Umesh Dupree RN  Outcome: Ongoing  10/19/2021 0027 by Theodore Webb RN  Outcome: Ongoing  10/19/2021 0025 by Theodore Webb RN  Outcome: Ongoing  10/19/2021 0023 by Theodore Webb RN  Outcome: Ongoing  Goal: Control of acute pain  Description: Control of acute pain  10/19/2021 1015 by Umesh Dupree RN  Outcome: Ongoing  10/19/2021 0027 by Theodore Webb RN  Outcome: Ongoing  10/19/2021 0025 by Theodore Webb RN  Outcome: Ongoing  10/19/2021 0023 by Theodore Webb RN  Outcome: Ongoing  Goal: Control of chronic pain  Description: Control of chronic pain  10/19/2021 1015 by Umesh Dupree RN  Outcome: Ongoing  10/19/2021 0027 by Theodore Webb RN  Outcome: Ongoing  10/19/2021 0025 by Theodore Webb RN  Outcome: Ongoing  10/19/2021 0023 by Theodore Webb RN  Outcome: Ongoing     Problem: Falls - Risk of:  Goal: Will remain free from falls  Description: Will remain free from falls  10/19/2021 1015 by Umesh Dupree RN  Outcome: Ongoing  10/19/2021 0027 by Theodore Webb RN  Outcome: Ongoing  10/19/2021 0025 by Theodore Webb RN  Outcome: Ongoing  10/19/2021 0023 by Theodore Webb RN  Outcome: Ongoing  Goal: Absence of physical injury  Description: Absence of physical injury  10/19/2021 1015 by Umesh Dupree RN  Outcome: Ongoing  10/19/2021 0027 by Theodore Webb RN  Outcome: Ongoing  10/19/2021 0025 by Theodore Webb RN  Outcome: Ongoing  10/19/2021 0023 by Theodore Webb RN  Outcome: Ongoing

## 2021-10-19 NOTE — PROGRESS NOTES
Nutrition Assessment     Type and Reason for Visit: Initial, Positive Nutrition Screen    Nutrition Recommendations/Plan:   Continue current diet     Nutrition Assessment:  Positive nutrition screen. Hx CHF, CKD, COPD, and diabetes. Pt has lost 6lbs in the last six months although insignificant. On carb control, low sodium diet with 1200ml fluid restriction. Recorded intakes >50%. No nutrition concerns at this time. Malnutrition Assessment:  Malnutrition Status: No malnutrition    Nutrition Related Findings: Glucose 152. Current Nutrition Therapies:    ADULT DIET; Regular; 4 carb choices (60 gm/meal);  Low Sodium (2 gm); 1200 ml    Anthropometric Measures:  · Height: 5' (152.4 cm)  · Current Body Wt: 119 lb (54 kg)   · BMI: 23.2    Nutrition Diagnosis:   No nutrition diagnosis at this time    Nutrition Interventions:   Food and/or Nutrient Delivery:  Continue Current Diet  Nutrition Education/Counseling:  Education not indicated   Coordination of Nutrition Care:  Continue to monitor while inpatient    Goals:  PO intake greater than 50%       Nutrition Monitoring and Evaluation:   Behavioral-Environmental Outcomes:  None Identified   Food/Nutrient Intake Outcomes:  Food and Nutrient Intake  Physical Signs/Symptoms Outcomes:  Biochemical Data, Weight     Discharge Planning:    Continue current diet     Electronically signed by Poepye Buck RD, LD on 10/19/21 at 12:22 PM EDT    Contact: [phone]

## 2021-10-19 NOTE — PLAN OF CARE
Problem: Pain:  Description: Pain management should include both nonpharmacologic and pharmacologic interventions. Goal: Pain level will decrease  Description: Pain level will decrease  10/19/2021 0025 by Kasey Humphrey RN  Outcome: Ongoing  10/19/2021 0023 by Kasey Humphrey RN  Outcome: Ongoing  10/18/2021 1808 by Karla Patel RN  Outcome: Ongoing  Goal: Control of acute pain  Description: Control of acute pain  10/19/2021 0025 by Kasey Humphrey RN  Outcome: Ongoing  10/19/2021 0023 by Kasey Humphrey RN  Outcome: Ongoing  Goal: Control of chronic pain  Description: Control of chronic pain  10/19/2021 0025 by Kasey Humphrey RN  Outcome: Ongoing  10/19/2021 0023 by Kasey Humphrey RN  Outcome: Ongoing   Pt able to express presence/absence of pain and rate pain appropriately using numerical scale. Pain/discomfort being managed with PRN analgesics per MD orders (see MAR). Pain assessed every shift and after interventions. Problem: Falls - Risk of:  Goal: Will remain free from falls  Description: Will remain free from falls  10/19/2021 0025 by Kasey Humphrey RN  Outcome: Ongoing  10/19/2021 0023 by Kasey Humphrey RN  Outcome: Ongoing  Goal: Absence of physical injury  Description: Absence of physical injury  10/19/2021 0025 by Kasey Humphrey RN  Outcome: Ongoing  10/19/2021 0023 by Kasey Humphrey RN  Outcome: Ongoing   Pt free from falls this shift. Fall precautions in place at all times. Call light always within reach. Pt able and agreeable to contact for safety appropriately.

## 2021-10-19 NOTE — PLAN OF CARE
Problem: Pain:  Description: Pain management should include both nonpharmacologic and pharmacologic interventions. Goal: Pain level will decrease  Description: Pain level will decrease  10/19/2021 0023 by Ssuana Fernandez RN  Outcome: Ongoing  10/18/2021 1808 by Carlita Dailey RN  Outcome: Ongoing  Goal: Control of acute pain  Description: Control of acute pain  Outcome: Ongoing  Goal: Control of chronic pain  Description: Control of chronic pain  Outcome: Ongoing   Pt able to express presence/absence of pain and rate pain appropriately using numerical scale. Pain/discomfort being managed with PRN analgesics per MD orders (see MAR). Pain assessed every shift and after interventions. Problem: Falls - Risk of:  Goal: Will remain free from falls  Description: Will remain free from falls  Outcome: Ongoing  Goal: Absence of physical injury  Description: Absence of physical injury  Outcome: Ongoing   Pt free from falls this shift. Fall precautions in place at all times. Call light always within reach. Pt able and agreeable to contact for safety appropriately.

## 2021-10-20 LAB
ALBUMIN SERPL-MCNC: 3.6 G/DL (ref 3.4–5)
ANION GAP SERPL CALCULATED.3IONS-SCNC: 12 MMOL/L (ref 3–16)
ANION GAP SERPL CALCULATED.3IONS-SCNC: 15 MMOL/L (ref 3–16)
BACTERIA: ABNORMAL /HPF
BASOPHILS ABSOLUTE: 0 K/UL (ref 0–0.2)
BASOPHILS RELATIVE PERCENT: 0.5 %
BILIRUBIN URINE: ABNORMAL
BLOOD, URINE: NEGATIVE
BUN BLDV-MCNC: 27 MG/DL (ref 7–20)
BUN BLDV-MCNC: 27 MG/DL (ref 7–20)
CALCIUM SERPL-MCNC: 9 MG/DL (ref 8.3–10.6)
CALCIUM SERPL-MCNC: 9.2 MG/DL (ref 8.3–10.6)
CHLORIDE BLD-SCNC: 102 MMOL/L (ref 99–110)
CHLORIDE BLD-SCNC: 103 MMOL/L (ref 99–110)
CLARITY: ABNORMAL
CO2: 18 MMOL/L (ref 21–32)
CO2: 23 MMOL/L (ref 21–32)
COLOR: ABNORMAL
COMMENT UA: ABNORMAL
CREAT SERPL-MCNC: 2.5 MG/DL (ref 0.6–1.2)
CREAT SERPL-MCNC: 2.7 MG/DL (ref 0.6–1.2)
CREATININE URINE: 113.9 MG/DL (ref 28–259)
CREATININE URINE: 114.2 MG/DL (ref 28–259)
EOSINOPHILS ABSOLUTE: 0.2 K/UL (ref 0–0.6)
EOSINOPHILS RELATIVE PERCENT: 6 %
EPITHELIAL CELLS, UA: 3 /HPF (ref 0–5)
GFR AFRICAN AMERICAN: 21
GFR AFRICAN AMERICAN: 23
GFR NON-AFRICAN AMERICAN: 18
GFR NON-AFRICAN AMERICAN: 19
GLUCOSE BLD-MCNC: 118 MG/DL (ref 70–99)
GLUCOSE BLD-MCNC: 145 MG/DL (ref 70–99)
GLUCOSE BLD-MCNC: 156 MG/DL (ref 70–99)
GLUCOSE BLD-MCNC: 175 MG/DL (ref 70–99)
GLUCOSE BLD-MCNC: 79 MG/DL (ref 70–99)
GLUCOSE BLD-MCNC: 98 MG/DL (ref 70–99)
GLUCOSE URINE: NEGATIVE MG/DL
HCT VFR BLD CALC: 31.6 % (ref 36–48)
HEMOGLOBIN: 10.3 G/DL (ref 12–16)
HYALINE CASTS: 3 /LPF (ref 0–8)
KETONES, URINE: NEGATIVE MG/DL
LEUKOCYTE ESTERASE, URINE: ABNORMAL
LYMPHOCYTES ABSOLUTE: 1.1 K/UL (ref 1–5.1)
LYMPHOCYTES RELATIVE PERCENT: 28.2 %
MCH RBC QN AUTO: 31.7 PG (ref 26–34)
MCHC RBC AUTO-ENTMCNC: 32.7 G/DL (ref 31–36)
MCV RBC AUTO: 96.8 FL (ref 80–100)
MICROALBUMIN UR-MCNC: 18.5 MG/DL
MICROALBUMIN/CREAT UR-RTO: 162 MG/G (ref 0–30)
MICROSCOPIC EXAMINATION: YES
MONOCYTES ABSOLUTE: 0.3 K/UL (ref 0–1.3)
MONOCYTES RELATIVE PERCENT: 6.9 %
NEUTROPHILS ABSOLUTE: 2.2 K/UL (ref 1.7–7.7)
NEUTROPHILS RELATIVE PERCENT: 58.4 %
NITRITE, URINE: NEGATIVE
PDW BLD-RTO: 14.7 % (ref 12.4–15.4)
PERFORMED ON: ABNORMAL
PERFORMED ON: NORMAL
PH UA: 5 (ref 5–8)
PHOSPHORUS: 4.2 MG/DL (ref 2.5–4.9)
PLATELET # BLD: 180 K/UL (ref 135–450)
PMV BLD AUTO: 8.7 FL (ref 5–10.5)
POTASSIUM SERPL-SCNC: 3.9 MMOL/L (ref 3.5–5.1)
POTASSIUM SERPL-SCNC: 4.2 MMOL/L (ref 3.5–5.1)
PROTEIN UA: 30 MG/DL
RBC # BLD: 3.27 M/UL (ref 4–5.2)
RBC UA: ABNORMAL /HPF (ref 0–4)
SODIUM BLD-SCNC: 135 MMOL/L (ref 136–145)
SODIUM BLD-SCNC: 138 MMOL/L (ref 136–145)
SODIUM URINE: 43 MMOL/L
SPECIFIC GRAVITY UA: >1.03 (ref 1–1.03)
UREA NITROGEN, UR: 260.8 MG/DL (ref 800–1666)
URINE TYPE: ABNORMAL
UROBILINOGEN, URINE: 0.2 E.U./DL
WBC # BLD: 3.8 K/UL (ref 4–11)
WBC UA: 3 /HPF (ref 0–5)

## 2021-10-20 PROCEDURE — 94761 N-INVAS EAR/PLS OXIMETRY MLT: CPT

## 2021-10-20 PROCEDURE — 2580000003 HC RX 258: Performed by: INTERNAL MEDICINE

## 2021-10-20 PROCEDURE — 82043 UR ALBUMIN QUANTITATIVE: CPT

## 2021-10-20 PROCEDURE — 6370000000 HC RX 637 (ALT 250 FOR IP): Performed by: STUDENT IN AN ORGANIZED HEALTH CARE EDUCATION/TRAINING PROGRAM

## 2021-10-20 PROCEDURE — 94640 AIRWAY INHALATION TREATMENT: CPT

## 2021-10-20 PROCEDURE — 2500000003 HC RX 250 WO HCPCS: Performed by: INTERNAL MEDICINE

## 2021-10-20 PROCEDURE — 6360000002 HC RX W HCPCS: Performed by: STUDENT IN AN ORGANIZED HEALTH CARE EDUCATION/TRAINING PROGRAM

## 2021-10-20 PROCEDURE — 1200000000 HC SEMI PRIVATE

## 2021-10-20 PROCEDURE — 6370000000 HC RX 637 (ALT 250 FOR IP): Performed by: INTERNAL MEDICINE

## 2021-10-20 PROCEDURE — 81001 URINALYSIS AUTO W/SCOPE: CPT

## 2021-10-20 PROCEDURE — 85025 COMPLETE CBC W/AUTO DIFF WBC: CPT

## 2021-10-20 PROCEDURE — 80069 RENAL FUNCTION PANEL: CPT

## 2021-10-20 PROCEDURE — 84300 ASSAY OF URINE SODIUM: CPT

## 2021-10-20 PROCEDURE — 36415 COLL VENOUS BLD VENIPUNCTURE: CPT

## 2021-10-20 PROCEDURE — 82570 ASSAY OF URINE CREATININE: CPT

## 2021-10-20 PROCEDURE — 2580000003 HC RX 258: Performed by: STUDENT IN AN ORGANIZED HEALTH CARE EDUCATION/TRAINING PROGRAM

## 2021-10-20 PROCEDURE — 84540 ASSAY OF URINE/UREA-N: CPT

## 2021-10-20 RX ORDER — SODIUM CHLORIDE 9 MG/ML
INJECTION, SOLUTION INTRAVENOUS CONTINUOUS
Status: DISCONTINUED | OUTPATIENT
Start: 2021-10-20 | End: 2021-10-20

## 2021-10-20 RX ORDER — LIDOCAINE HYDROCHLORIDE 10 MG/ML
5 INJECTION, SOLUTION EPIDURAL; INFILTRATION; INTRACAUDAL; PERINEURAL ONCE
Status: DISCONTINUED | OUTPATIENT
Start: 2021-10-20 | End: 2021-10-23 | Stop reason: HOSPADM

## 2021-10-20 RX ORDER — SODIUM CHLORIDE 9 MG/ML
25 INJECTION, SOLUTION INTRAVENOUS PRN
Status: DISCONTINUED | OUTPATIENT
Start: 2021-10-20 | End: 2021-10-23 | Stop reason: HOSPADM

## 2021-10-20 RX ORDER — SODIUM CHLORIDE 0.9 % (FLUSH) 0.9 %
5-40 SYRINGE (ML) INJECTION PRN
Status: DISCONTINUED | OUTPATIENT
Start: 2021-10-20 | End: 2021-10-23 | Stop reason: HOSPADM

## 2021-10-20 RX ORDER — SODIUM CHLORIDE 0.9 % (FLUSH) 0.9 %
5-40 SYRINGE (ML) INJECTION EVERY 12 HOURS SCHEDULED
Status: DISCONTINUED | OUTPATIENT
Start: 2021-10-20 | End: 2021-10-23 | Stop reason: HOSPADM

## 2021-10-20 RX ADMIN — CLOPIDOGREL BISULFATE 75 MG: 75 TABLET ORAL at 08:06

## 2021-10-20 RX ADMIN — OXYCODONE AND ACETAMINOPHEN 1 TABLET: 7.5; 325 TABLET ORAL at 08:05

## 2021-10-20 RX ADMIN — DULOXETINE HYDROCHLORIDE 60 MG: 60 CAPSULE, DELAYED RELEASE ORAL at 08:05

## 2021-10-20 RX ADMIN — INSULIN LISPRO 1 UNITS: 100 INJECTION, SOLUTION INTRAVENOUS; SUBCUTANEOUS at 20:19

## 2021-10-20 RX ADMIN — SODIUM CHLORIDE, PRESERVATIVE FREE 10 ML: 5 INJECTION INTRAVENOUS at 20:22

## 2021-10-20 RX ADMIN — RANOLAZINE 1000 MG: 500 TABLET, FILM COATED, EXTENDED RELEASE ORAL at 08:06

## 2021-10-20 RX ADMIN — RANOLAZINE 1000 MG: 500 TABLET, FILM COATED, EXTENDED RELEASE ORAL at 20:18

## 2021-10-20 RX ADMIN — ASPIRIN 81 MG CHEWABLE TABLET 81 MG: 81 TABLET CHEWABLE at 08:06

## 2021-10-20 RX ADMIN — Medication: at 20:05

## 2021-10-20 RX ADMIN — SODIUM CHLORIDE: 9 INJECTION, SOLUTION INTRAVENOUS at 10:38

## 2021-10-20 RX ADMIN — OXYCODONE AND ACETAMINOPHEN 1 TABLET: 7.5; 325 TABLET ORAL at 20:18

## 2021-10-20 RX ADMIN — SODIUM CHLORIDE, PRESERVATIVE FREE 10 ML: 5 INJECTION INTRAVENOUS at 08:06

## 2021-10-20 RX ADMIN — QUETIAPINE FUMARATE 25 MG: 25 TABLET ORAL at 20:18

## 2021-10-20 RX ADMIN — INSULIN GLARGINE 5 UNITS: 100 INJECTION, SOLUTION SUBCUTANEOUS at 08:07

## 2021-10-20 RX ADMIN — ATORVASTATIN CALCIUM 80 MG: 80 TABLET, FILM COATED ORAL at 20:18

## 2021-10-20 RX ADMIN — TORSEMIDE 10 MG: 20 TABLET ORAL at 08:06

## 2021-10-20 RX ADMIN — ENOXAPARIN SODIUM 40 MG: 40 INJECTION SUBCUTANEOUS at 08:06

## 2021-10-20 RX ADMIN — INSULIN LISPRO 1 UNITS: 100 INJECTION, SOLUTION INTRAVENOUS; SUBCUTANEOUS at 17:23

## 2021-10-20 RX ADMIN — INSULIN GLARGINE 5 UNITS: 100 INJECTION, SOLUTION SUBCUTANEOUS at 20:19

## 2021-10-20 RX ADMIN — INSULIN LISPRO 1 UNITS: 100 INJECTION, SOLUTION INTRAVENOUS; SUBCUTANEOUS at 12:24

## 2021-10-20 RX ADMIN — PANTOPRAZOLE SODIUM 40 MG: 40 TABLET, DELAYED RELEASE ORAL at 06:43

## 2021-10-20 RX ADMIN — Medication 2 PUFF: at 08:12

## 2021-10-20 ASSESSMENT — PAIN DESCRIPTION - ONSET: ONSET: ON-GOING

## 2021-10-20 ASSESSMENT — PAIN DESCRIPTION - DESCRIPTORS: DESCRIPTORS: HEAVINESS

## 2021-10-20 ASSESSMENT — PAIN DESCRIPTION - LOCATION: LOCATION: CHEST

## 2021-10-20 ASSESSMENT — PAIN SCALES - GENERAL
PAINLEVEL_OUTOF10: 6
PAINLEVEL_OUTOF10: 5
PAINLEVEL_OUTOF10: 6
PAINLEVEL_OUTOF10: 0

## 2021-10-20 ASSESSMENT — PAIN - FUNCTIONAL ASSESSMENT: PAIN_FUNCTIONAL_ASSESSMENT: PREVENTS OR INTERFERES SOME ACTIVE ACTIVITIES AND ADLS

## 2021-10-20 ASSESSMENT — PAIN DESCRIPTION - PROGRESSION: CLINICAL_PROGRESSION: NOT CHANGED

## 2021-10-20 ASSESSMENT — PAIN DESCRIPTION - ORIENTATION: ORIENTATION: MID

## 2021-10-20 ASSESSMENT — PAIN DESCRIPTION - FREQUENCY: FREQUENCY: CONTINUOUS

## 2021-10-20 ASSESSMENT — PAIN DESCRIPTION - PAIN TYPE: TYPE: ACUTE PAIN

## 2021-10-20 NOTE — CONSULTS
Nephrology Consult Note   [facility] [employer]      Chief Complaint: Chest pain     History of Present Illness: Ms Sampson Cobb is a 60 y.o. female with a past medical history significant for CAD with multipleinterventions (DEANGELO to RCA on 06/02/2016. DEANGELO to prox RCA and left circumflex on 07/12/2018. Balloon angioplasty alone to OM1 and stent to right PDA on 11/30/2020). A Fib, chronic diastolic CHF, DM, hypertension, hyperlipidemia, history of CVA, COPD who presented to the ED with chest pressure that did not improve with Nitro. Patient had a CT scan with contrast on 10/18/21 to evaluate chest pain. CT was negative for PE but creatinine has been trending up since. Of note patient was also on Torsemide on admission and that was discontinued today after she received a dose in the morning    Subjective:    Resting in bed; NAD; No shortness of breath; chest pain appears to be improving    ROS: + Chest pain - improved; no shortness of breath; no lower extremity edema. No obstructive urinary symptoms.  All other ROS negative    Scheduled Meds:   lidocaine 1 % injection  5 mL IntraDERmal Once    sodium chloride flush  5-40 mL IntraVENous 2 times per day    metoprolol succinate  50 mg Oral Daily    aspirin  81 mg Oral Daily    atorvastatin  80 mg Oral Nightly    budesonide-formoterol  2 puff Inhalation Daily    clopidogrel  75 mg Oral Daily    DULoxetine  60 mg Oral Daily    insulin glargine  5 Units SubCUTAneous BID    pantoprazole  40 mg Oral QAM AC    QUEtiapine  25 mg Oral Nightly    ranolazine  1,000 mg Oral BID    sodium chloride flush  5-40 mL IntraVENous 2 times per day    enoxaparin  40 mg SubCUTAneous Daily    insulin lispro  0-6 Units SubCUTAneous TID WC    insulin lispro  0-3 Units SubCUTAneous Nightly        sodium chloride      sodium bicarbonate infusion      sodium chloride      dextrose         PRN Meds:.sodium chloride flush, sodium chloride, oxyCODONE-acetaminophen, nitroGLYCERIN, tiZANidine, sodium chloride flush, sodium chloride, ondansetron **OR** ondansetron, polyethylene glycol, acetaminophen **OR** acetaminophen, nitroGLYCERIN, glucose, dextrose, glucagon (rDNA), dextrose    Physical Exam:    TEMPERATURE:  Current - Temp: 98 °F (36.7 °C); Max - Temp  Av.1 °F (36.7 °C)  Min: 97.5 °F (36.4 °C)  Max: 98.5 °F (36.9 °C)  RESPIRATIONS RANGE: Resp  Av.7  Min: 16  Max: 18  PULSE RANGE: Pulse  Av.7  Min: 50  Max: 60  BLOOD PRESSURE RANGE:  Systolic (58KHS), BILLY:254 , Min:109 , JWT:977   ; Diastolic (31IWX), LZS:61, Min:52, Max:75    24HR INTAKE/OUTPUT:      Intake/Output Summary (Last 24 hours) at 10/20/2021 1703  Last data filed at 10/19/2021 2054  Gross per 24 hour   Intake 240 ml   Output 500 ml   Net -260 ml       Patient Vitals for the past 96 hrs (Last 3 readings):   Weight   10/20/21 0456 119 lb 0.8 oz (54 kg)   10/19/21 0600 119 lb 0.8 oz (54 kg)   10/18/21 0754 118 lb 9.7 oz (53.8 kg)       General: Alert, Awake, NAD  HEENT: Normocephalic, atraumatic, Nose and ears appear externally without deformity, MMM  Neck: No Thyromegaly, Trachea is midline, No Carotid bruit  Eyes: EOMI, RIAN  Chest: clear to auscultation, no intercostal retractions  CVS: RRR, no murmur, no rub  Abdomen: soft, non tender, no organomegaly, no bruit appreciated  Extremities: no edema, no cyanosis.   Skin: normal texture, normal skin turgor, no rash  Musculoskeletal: normal ROM, no joint swelling, no visible deformity  Neurological: CN intact, no focal motor neurological deficit  Psych: normal affect    Past Medical History:   Diagnosis Date    Acid reflux     Anemia     Anxiety and depression     Arthritis     Asthma     Atrial fibrillation (HCC)     CAD (coronary artery disease) 12/3/2012    Cerebral artery occlusion with cerebral infarction [facility])     TIA\"\"S--right sided weakness & headache    CHF (congestive heart failure) (HCC)     Chronic kidney disease--stage III     40% kidney function    COPD [facility] obstructive [facility]) [facility] [address]     DM2 (diabetes mellitus, type 2) ([facility] [address] [facility]     Dysarthria     Fibromyalgia 6/7/2016    Headache(784.0) 2/19/2013    Hemisensory loss     History of blood transfusion 11/2020    pt denies having transfusion reaction    Hyperlipidemia     Hypertension     IBS (irritable bowel syndrome)     Inferior vena cava occlusion (HCC)     Keratitis     MI, old     Neuropathy     Superior vena cava obstruction     Temporal arteritis ([facility] [address] 2/24/2014    Wears glasses      Past Surgical History:   Procedure Laterality Date    ABLATION OF DYSRHYTHMIC FOCUS  1999  and 11/2020    times 2    ARTERY BIOPSY Right 04/23/2014    RIGHT TEMPORAL ARTERY BIOPSY    CAROTID ARTERY SURGERY Left     clean up per pt    CATARACT REMOVAL Bilateral     CHOLECYSTECTOMY      COLONOSCOPY N/A 4/9/2021    COLONOSCOPY WITH BIOPSY performed by Jackie Lanza MD at [address] [facility] 10/15/2021    COLONOSCOPY performed by Jackie Lanza MD at [address] [place] [address]  2020    HYSTERECTOMY      JOINT REPLACEMENT Right     KNEE ARTHROSCOPY Right     PTCA  10/2019    LAD and RCA inrtervention    TUNNELED VENOUS PORT PLACEMENT      left thigh. SMART PORT-----Removed--total of 4 port placement and removal    UPPER GASTROINTESTINAL ENDOSCOPY N/A 7/6/2020    EGD DIAGNOSTIC ONLY performed by Jailene Whitley MD at [address] [place] [address] Road History   Problem Relation Age of Onset    Diabetes Mother     High Cholesterol Mother     Stroke Mother     Cancer Mother     High Blood Pressure Mother     No Known Problems Paternal Grandfather         lung issues      Social History     Socioeconomic History    Marital status:       Spouse name: Not on file    Number of children: 6    Years of education: Not on file    Highest education level: Not on file   Occupational History    Not on file   Tobacco Use    Smoking status: Former Smoker     Packs/day: 0.50     Years: 35.00     Pack years: 17.50     Types: Cigarettes     Quit date: 7/1/2018     Years since quitting: 3.3    Smokeless tobacco: Never Used    Tobacco comment: 5/13/15 has not smoked since hospitalization - kh   Vaping Use    Vaping Use: Never used   Substance and Sexual Activity    Alcohol use: No     Alcohol/week: 0.0 standard drinks    Drug use: Never    Sexual activity: Not Currently     Partners: Male   Other Topics Concern    Not on file   Social History Narrative    Not on file     Social Determinants of Health     Financial Resource Strain:     Difficulty of Paying Living Expenses:    Food Insecurity:     Worried About Running Out of Food in the Last Year:     Ran Out of Food in the Last Year:    Transportation Needs:     Lack of Transportation (Medical):  Lack of Transportation (Non-Medical):    Physical Activity:     Days of Exercise per Week:     Minutes of Exercise per Session:    Stress:     Feeling of Stress :    Social Connections:     Frequency of Communication with Friends and Family:     Frequency of Social Gatherings with Friends and Family:     Attends [employer] Services:     Active Member of Clubs or Organizations:     Attends Club or Organization Meetings:     Marital Status:    Intimate Partner Violence:     Fear of Current or Ex-Partner:     Emotionally Abused:     Physically Abused:     Sexually Abused:           Allergies:  No Known Allergies       LAB DATA:    CBC:   Lab Results   Component Value Date    WBC 3.8 10/20/2021    RBC 3.27 10/20/2021    HGB 10.3 10/20/2021    HCT 31.6 10/20/2021    MCV 96.8 10/20/2021    MCH 31.7 10/20/2021    MCHC 32.7 10/20/2021    RDW 14.7 10/20/2021     10/20/2021    MPV 8.7 10/20/2021     BMP:    Lab Results   Component Value Date     10/20/2021    K 4.2 10/20/2021    K 4.3 10/18/2021     10/20/2021    CO2 18 10/20/2021    BUN 27 10/20/2021    CREATININE 2.7 10/20/2021 CALCIUM 9.2 10/20/2021    GFRAA 21 10/20/2021    GFRAA 60 05/23/2013    LABGLOM 18 10/20/2021    GLUCOSE 118 10/20/2021     Ionized Calcium:  No results found for: IONCA  Magnesium:    Lab Results   Component Value Date    MG 1.90 07/05/2020     Phosphorus:    Lab Results   Component Value Date    PHOS 4.2 10/20/2021     U/A:    Lab Results   Component Value Date    COLORU DK YELLOW 10/19/2021    PHUR 5.0 10/19/2021    LABCAST 20-40 Hyaline 07/06/2017    WBCUA 2 10/19/2021    RBCUA 3 10/19/2021    MUCUS 1+ 05/23/2013    YEAST Rare Yeast 05/14/2012    BACTERIA RARE 10/19/2021    CLARITYU Clear 10/19/2021    SPECGRAV >1.030 10/19/2021    LEUKOCYTESUR SMALL 10/19/2021    UROBILINOGEN 1.0 10/19/2021    BILIRUBINUR SMALL 10/19/2021    BILIRUBINUR NEGATIVE 05/14/2012    BLOODU Negative 10/19/2021    GLUCOSEU Negative 10/19/2021    GLUCOSEU NEGATIVE 05/14/2012    AMORPHOUS Rare 12/11/2014         IMPRESSION/RECOMMENDATIONS:      Active Problems:    Chest pain  Resolved Problems:    * No resolved hospital problems. *    1. KOLBY - Likely multifactorial (contrast exposure and continued use of loop diuretic)  - Loop diuretic was stopped  - continue IVF - change composition and rate  - no immediate indication for RRT   - Avoid exposure to Nephrotoxic agents  - Measure PVR and insert gill if needed  - check urine lytes    2. Acidosis - Added HCO to IVF    3. Anemia - stable    4.  HTN - modifications have been made to antihypertensive regimen because of relative hypotension  - continue current medications

## 2021-10-20 NOTE — PLAN OF CARE
Problem: Pain:  Goal: Pain level will decrease  Description: Pain level will decrease  10/20/2021 0739 by Bradley Frederick RN  Outcome: Ongoing  10/20/2021 0351 by Caty Shanks RN  Outcome: Ongoing  Goal: Control of acute pain  Description: Control of acute pain  10/20/2021 0739 by Bradley Frederick RN  Outcome: Ongoing  10/20/2021 0351 by Caty Shanks RN  Outcome: Ongoing  Goal: Control of chronic pain  Description: Control of chronic pain  Outcome: Ongoing     Problem: Falls - Risk of:  Goal: Will remain free from falls  Description: Will remain free from falls  10/20/2021 0739 by Bradley Frederick RN  Outcome: Ongoing  10/20/2021 0351 by Caty Shanks RN  Outcome: Ongoing  Goal: Absence of physical injury  Description: Absence of physical injury  Outcome: Ongoing

## 2021-10-20 NOTE — PROGRESS NOTES
Private message sent to Dr Sammie Swan. Dr Sammie Swan approved midline to be placed in pt's dominant arm. Midline order placed.

## 2021-10-20 NOTE — PROGRESS NOTES
Hospitalist Progress Note      PCP: Ania Yanez MD    Date of Admission: 10/18/2021    Chief Complaint: chest pain awakened from sleep       Subjective:     Chest pain much better today. No pleurisy. Medications:  Reviewed    Infusion Medications    sodium chloride 75 mL/hr at 10/20/21 1038    sodium chloride      dextrose       Scheduled Medications    metoprolol succinate  50 mg Oral Daily    aspirin  81 mg Oral Daily    atorvastatin  80 mg Oral Nightly    budesonide-formoterol  2 puff Inhalation Daily    clopidogrel  75 mg Oral Daily    DULoxetine  60 mg Oral Daily    insulin glargine  5 Units SubCUTAneous BID    pantoprazole  40 mg Oral QAM AC    QUEtiapine  25 mg Oral Nightly    ranolazine  1,000 mg Oral BID    sodium chloride flush  5-40 mL IntraVENous 2 times per day    enoxaparin  40 mg SubCUTAneous Daily    insulin lispro  0-6 Units SubCUTAneous TID WC    insulin lispro  0-3 Units SubCUTAneous Nightly     PRN Meds: oxyCODONE-acetaminophen, nitroGLYCERIN, tiZANidine, sodium chloride flush, sodium chloride, ondansetron **OR** ondansetron, polyethylene glycol, acetaminophen **OR** acetaminophen, nitroGLYCERIN, glucose, dextrose, glucagon (rDNA), dextrose      Intake/Output Summary (Last 24 hours) at 10/20/2021 1320  Last data filed at 10/19/2021 2054  Gross per 24 hour   Intake 240 ml   Output 500 ml   Net -260 ml       Physical Exam Performed:    BP (!) 141/57   Pulse 59   Temp 98.5 °F (36.9 °C) (Oral)   Resp 18   Ht 5' (1.524 m)   Wt 119 lb 0.8 oz (54 kg)   SpO2 98%   BMI 23.25 kg/m²     General appearance: No apparent distress, appears stated age and cooperative. HEENT: Pupils equal, round, and reactive to light. Conjunctivae/corneas clear. Neck: Supple, with full range of motion. No jugular venous distention. Trachea midline. Respiratory:  Normal respiratory effort. Clear to auscultation, bilaterally without Rales/Wheezes/Rhonchi.   Cardiovascular: Regular rate and rhythm with normal S1/S2 without murmurs, rubs or gallops. Abdomen: Soft, non-tender, non-distended with normal bowel sounds. Musculoskeletal: No clubbing, cyanosis or edema bilaterally. Full range of motion without deformity. Skin: Skin color, texture, turgor normal.  No rashes or lesions. Neurologic:  Neurovascularly intact without any focal sensory/motor deficits. Cranial nerves: II-XII intact, grossly non-focal.  Psychiatric: Alert and oriented, thought content appropriate, normal insight  Capillary Refill: Brisk,3 seconds, normal   Peripheral Pulses: +2 palpable, equal bilaterally       Labs:   Recent Labs     10/18/21  0824 10/19/21  0754 10/20/21  0758   WBC 5.6 4.2 3.8*   HGB 11.3* 9.8* 10.3*   HCT 35.0* 30.7* 31.6*    167 180     Recent Labs     10/18/21  0824 10/19/21  0754 10/20/21  0758    138 138   K 4.3 4.1 3.9    105 103   CO2 19* 21 23   BUN 12 18 27*   CREATININE 0.9 1.8* 2.5*   CALCIUM 9.6 8.7 9.0     Recent Labs     10/18/21  0824   AST 46*   ALT 42*   BILITOT 0.3   ALKPHOS 134*     No results for input(s): INR in the last 72 hours. Recent Labs     10/18/21  0824 10/18/21  1543   TROPONINI <0.01 <0.01       Urinalysis:      Lab Results   Component Value Date    NITRU Negative 10/19/2021    WBCUA 2 10/19/2021    BACTERIA RARE 10/19/2021    RBCUA 3 10/19/2021    BLOODU Negative 10/19/2021    SPECGRAV >1.030 10/19/2021    GLUCOSEU Negative 10/19/2021    GLUCOSEU NEGATIVE 05/14/2012       Radiology:  CT CHEST PULMONARY EMBOLISM W CONTRAST   Final Result   1. Negative for pulmonary embolus. 2. Mild bilateral pulmonary fibrosis. 3. Chronic SVC occlusion with associated venous collateralization. XR CHEST (2 VW)   Final Result   No acute process. Assessment/Plan:    Active Hospital Problems    Diagnosis     Chest pain [R07.9]      Chest pain - non anginal  Extensive history of CAD and prior PTCA stenting. on Ranexa for angina.   Most recent stress test in June was negative. - serial trop - negative. - EKG with no new ST-T changes.     - Discussed with Cardiology - no further evaluation indicated. - CTA - no PE, chronic SVC occlusion with collateralization. KOLBY not POA. Possibly related to hemodynamic shifts of Labile BP. BP was elevated on presentation - then low normal.   Stopped amlodipine. Decreased dose BB - this was held past 2 days. Check UA - suggestive of prerenal process. Stop torsemide and ask nephrology to get involved. Plan to repeat renal panel this afternoon. Diastolic CHF. Patient is not in exacerbation. Torsemide on hold. Stop fluid restriction.        Atrial fibrillation. Continue metoprolol - dose changed as above. Patient is not on anticoagulation due to Hx of GI bleed.        Diabetes mellitus. Reduced home glargine dose. Glargine 5 units with sliding scale       Depression. Continue home dose of quetiapine  Continue Cymbalta       GERD   Continue pantoprazole       DVT Prophylaxis: Lovenox/   Diet: ADULT DIET; Regular; 4 carb choices (60 gm/meal); Low Sodium (2 gm); 1200 ml  Code Status: Full Code  PT/OT Eval Status: pending clinical stability.          Dispo - pending clinical improvement (patient lives alone in a senior living apartment).         Sole Brothers MD

## 2021-10-20 NOTE — PROGRESS NOTES
Upon arrival reviewd pt chart, confirmed consent, confirmed order, reviewed order for Midline. Performed TO with pt and YOLETTE Jackson. No difficulties accessing basilic vessel. Confirmed placement with blood return. Pt lowered to lowest position, call light in reach. Provided handoff to YOLETTE Jackson    OK to use Midline. Yuly Ibarra

## 2021-10-21 LAB
ALBUMIN SERPL-MCNC: 3.2 G/DL (ref 3.4–5)
ANION GAP SERPL CALCULATED.3IONS-SCNC: 13 MMOL/L (ref 3–16)
BASOPHILS ABSOLUTE: 0 K/UL (ref 0–0.2)
BASOPHILS RELATIVE PERCENT: 0.5 %
BUN BLDV-MCNC: 29 MG/DL (ref 7–20)
CALCIUM SERPL-MCNC: 8.7 MG/DL (ref 8.3–10.6)
CHLORIDE BLD-SCNC: 99 MMOL/L (ref 99–110)
CO2: 23 MMOL/L (ref 21–32)
CREAT SERPL-MCNC: 2.3 MG/DL (ref 0.6–1.2)
EOSINOPHILS ABSOLUTE: 0.2 K/UL (ref 0–0.6)
EOSINOPHILS RELATIVE PERCENT: 5 %
GFR AFRICAN AMERICAN: 26
GFR NON-AFRICAN AMERICAN: 21
GLUCOSE BLD-MCNC: 100 MG/DL (ref 70–99)
GLUCOSE BLD-MCNC: 106 MG/DL (ref 70–99)
GLUCOSE BLD-MCNC: 176 MG/DL (ref 70–99)
GLUCOSE BLD-MCNC: 87 MG/DL (ref 70–99)
GLUCOSE BLD-MCNC: 97 MG/DL (ref 70–99)
HCT VFR BLD CALC: 29.4 % (ref 36–48)
HEMOGLOBIN: 9.6 G/DL (ref 12–16)
LYMPHOCYTES ABSOLUTE: 1.1 K/UL (ref 1–5.1)
LYMPHOCYTES RELATIVE PERCENT: 26 %
MAGNESIUM: 1.8 MG/DL (ref 1.8–2.4)
MCH RBC QN AUTO: 31.6 PG (ref 26–34)
MCHC RBC AUTO-ENTMCNC: 32.7 G/DL (ref 31–36)
MCV RBC AUTO: 96.7 FL (ref 80–100)
MONOCYTES ABSOLUTE: 0.3 K/UL (ref 0–1.3)
MONOCYTES RELATIVE PERCENT: 6.2 %
NEUTROPHILS ABSOLUTE: 2.5 K/UL (ref 1.7–7.7)
NEUTROPHILS RELATIVE PERCENT: 62.3 %
PDW BLD-RTO: 14.6 % (ref 12.4–15.4)
PERFORMED ON: ABNORMAL
PERFORMED ON: NORMAL
PHOSPHORUS: 3.2 MG/DL (ref 2.5–4.9)
PLATELET # BLD: 165 K/UL (ref 135–450)
PMV BLD AUTO: 8.4 FL (ref 5–10.5)
POTASSIUM SERPL-SCNC: 3.9 MMOL/L (ref 3.5–5.1)
RBC # BLD: 3.04 M/UL (ref 4–5.2)
SODIUM BLD-SCNC: 135 MMOL/L (ref 136–145)
WBC # BLD: 4.1 K/UL (ref 4–11)

## 2021-10-21 PROCEDURE — 85025 COMPLETE CBC W/AUTO DIFF WBC: CPT

## 2021-10-21 PROCEDURE — 2580000003 HC RX 258: Performed by: INTERNAL MEDICINE

## 2021-10-21 PROCEDURE — 6360000002 HC RX W HCPCS: Performed by: STUDENT IN AN ORGANIZED HEALTH CARE EDUCATION/TRAINING PROGRAM

## 2021-10-21 PROCEDURE — 83735 ASSAY OF MAGNESIUM: CPT

## 2021-10-21 PROCEDURE — 80069 RENAL FUNCTION PANEL: CPT

## 2021-10-21 PROCEDURE — 6370000000 HC RX 637 (ALT 250 FOR IP): Performed by: INTERNAL MEDICINE

## 2021-10-21 PROCEDURE — 1200000000 HC SEMI PRIVATE

## 2021-10-21 PROCEDURE — 2500000003 HC RX 250 WO HCPCS: Performed by: INTERNAL MEDICINE

## 2021-10-21 PROCEDURE — 2580000003 HC RX 258: Performed by: STUDENT IN AN ORGANIZED HEALTH CARE EDUCATION/TRAINING PROGRAM

## 2021-10-21 PROCEDURE — 94640 AIRWAY INHALATION TREATMENT: CPT

## 2021-10-21 PROCEDURE — 6370000000 HC RX 637 (ALT 250 FOR IP): Performed by: STUDENT IN AN ORGANIZED HEALTH CARE EDUCATION/TRAINING PROGRAM

## 2021-10-21 PROCEDURE — 36415 COLL VENOUS BLD VENIPUNCTURE: CPT

## 2021-10-21 PROCEDURE — 94761 N-INVAS EAR/PLS OXIMETRY MLT: CPT

## 2021-10-21 RX ADMIN — Medication: at 17:25

## 2021-10-21 RX ADMIN — ENOXAPARIN SODIUM 40 MG: 40 INJECTION SUBCUTANEOUS at 09:08

## 2021-10-21 RX ADMIN — Medication: at 06:14

## 2021-10-21 RX ADMIN — RANOLAZINE 1000 MG: 500 TABLET, FILM COATED, EXTENDED RELEASE ORAL at 21:33

## 2021-10-21 RX ADMIN — DULOXETINE HYDROCHLORIDE 60 MG: 60 CAPSULE, DELAYED RELEASE ORAL at 09:08

## 2021-10-21 RX ADMIN — QUETIAPINE FUMARATE 25 MG: 25 TABLET ORAL at 21:33

## 2021-10-21 RX ADMIN — SODIUM CHLORIDE, PRESERVATIVE FREE 10 ML: 5 INJECTION INTRAVENOUS at 22:44

## 2021-10-21 RX ADMIN — ASPIRIN 81 MG CHEWABLE TABLET 81 MG: 81 TABLET CHEWABLE at 09:08

## 2021-10-21 RX ADMIN — OXYCODONE AND ACETAMINOPHEN 1 TABLET: 7.5; 325 TABLET ORAL at 16:29

## 2021-10-21 RX ADMIN — OXYCODONE AND ACETAMINOPHEN 1 TABLET: 7.5; 325 TABLET ORAL at 22:44

## 2021-10-21 RX ADMIN — METOPROLOL SUCCINATE 50 MG: 50 TABLET, EXTENDED RELEASE ORAL at 09:08

## 2021-10-21 RX ADMIN — INSULIN LISPRO 1 UNITS: 100 INJECTION, SOLUTION INTRAVENOUS; SUBCUTANEOUS at 13:17

## 2021-10-21 RX ADMIN — RANOLAZINE 1000 MG: 500 TABLET, FILM COATED, EXTENDED RELEASE ORAL at 09:08

## 2021-10-21 RX ADMIN — INSULIN GLARGINE 5 UNITS: 100 INJECTION, SOLUTION SUBCUTANEOUS at 09:08

## 2021-10-21 RX ADMIN — OXYCODONE AND ACETAMINOPHEN 1 TABLET: 7.5; 325 TABLET ORAL at 09:08

## 2021-10-21 RX ADMIN — SODIUM CHLORIDE, PRESERVATIVE FREE 10 ML: 5 INJECTION INTRAVENOUS at 21:35

## 2021-10-21 RX ADMIN — ATORVASTATIN CALCIUM 80 MG: 80 TABLET, FILM COATED ORAL at 21:34

## 2021-10-21 RX ADMIN — ACETAMINOPHEN 650 MG: 325 TABLET ORAL at 21:34

## 2021-10-21 RX ADMIN — ONDANSETRON 4 MG: 2 INJECTION INTRAMUSCULAR; INTRAVENOUS at 18:13

## 2021-10-21 RX ADMIN — INSULIN GLARGINE 5 UNITS: 100 INJECTION, SOLUTION SUBCUTANEOUS at 21:35

## 2021-10-21 RX ADMIN — CLOPIDOGREL BISULFATE 75 MG: 75 TABLET ORAL at 09:08

## 2021-10-21 RX ADMIN — PANTOPRAZOLE SODIUM 40 MG: 40 TABLET, DELAYED RELEASE ORAL at 05:21

## 2021-10-21 RX ADMIN — Medication 2 PUFF: at 08:10

## 2021-10-21 ASSESSMENT — PAIN DESCRIPTION - FREQUENCY
FREQUENCY: CONTINUOUS

## 2021-10-21 ASSESSMENT — PAIN - FUNCTIONAL ASSESSMENT
PAIN_FUNCTIONAL_ASSESSMENT: PREVENTS OR INTERFERES SOME ACTIVE ACTIVITIES AND ADLS

## 2021-10-21 ASSESSMENT — PAIN DESCRIPTION - PAIN TYPE
TYPE: ACUTE PAIN

## 2021-10-21 ASSESSMENT — PAIN DESCRIPTION - ONSET
ONSET: ON-GOING

## 2021-10-21 ASSESSMENT — PAIN SCALES - GENERAL
PAINLEVEL_OUTOF10: 7
PAINLEVEL_OUTOF10: 8
PAINLEVEL_OUTOF10: 0
PAINLEVEL_OUTOF10: 8
PAINLEVEL_OUTOF10: 8

## 2021-10-21 ASSESSMENT — PAIN DESCRIPTION - PROGRESSION
CLINICAL_PROGRESSION: GRADUALLY WORSENING
CLINICAL_PROGRESSION: NOT CHANGED

## 2021-10-21 ASSESSMENT — PAIN DESCRIPTION - LOCATION
LOCATION: CHEST

## 2021-10-21 ASSESSMENT — PAIN DESCRIPTION - DESCRIPTORS
DESCRIPTORS: ACHING;HEAVINESS
DESCRIPTORS: HEAVINESS
DESCRIPTORS: ACHING;HEAVINESS
DESCRIPTORS: HEAVINESS

## 2021-10-21 ASSESSMENT — PAIN DESCRIPTION - ORIENTATION
ORIENTATION: MID

## 2021-10-21 NOTE — PROGRESS NOTES
Patient was bleeding from midline site, and patient is on plavix. Gauze and coban wrapped around site and patient encouraged to elevate arm above head.

## 2021-10-21 NOTE — CARE COORDINATION
10/21 Chart reviewed. From home alone with assistance from [facility] and [facility], Home Care Aide 5 d/week, MOW's. Plan return to home with resumption of [facility] services. Need Therapy evaluations to determine if patient is safe to return to home. Follow for additional needs.  Electronically signed by Tess Christy RN on 10/21/2021 at 2:17 PM

## 2021-10-21 NOTE — PROGRESS NOTES
Hospitalist Progress Note      PCP: Elsa Berry MD    Date of Admission: 10/18/2021    Chief Complaint: chest pain awakened from sleep       Subjective:     No complaint of chest pain today. No pleurisy. Medications:  Reviewed    Infusion Medications    sodium chloride      sodium bicarbonate infusion 100 mL/hr at 10/21/21 0614    sodium chloride      dextrose       Scheduled Medications    lidocaine 1 % injection  5 mL IntraDERmal Once    sodium chloride flush  5-40 mL IntraVENous 2 times per day    metoprolol succinate  50 mg Oral Daily    aspirin  81 mg Oral Daily    atorvastatin  80 mg Oral Nightly    budesonide-formoterol  2 puff Inhalation Daily    clopidogrel  75 mg Oral Daily    DULoxetine  60 mg Oral Daily    insulin glargine  5 Units SubCUTAneous BID    pantoprazole  40 mg Oral QAM AC    QUEtiapine  25 mg Oral Nightly    ranolazine  1,000 mg Oral BID    sodium chloride flush  5-40 mL IntraVENous 2 times per day    enoxaparin  40 mg SubCUTAneous Daily    insulin lispro  0-6 Units SubCUTAneous TID WC    insulin lispro  0-3 Units SubCUTAneous Nightly     PRN Meds: sodium chloride flush, sodium chloride, oxyCODONE-acetaminophen, nitroGLYCERIN, tiZANidine, sodium chloride flush, sodium chloride, ondansetron **OR** ondansetron, polyethylene glycol, acetaminophen **OR** acetaminophen, nitroGLYCERIN, glucose, dextrose, glucagon (rDNA), dextrose      Intake/Output Summary (Last 24 hours) at 10/21/2021 1346  Last data filed at 10/21/2021 0615  Gross per 24 hour   Intake 402.19 ml   Output 450 ml   Net -47.81 ml       Physical Exam Performed:    BP (!) 156/78   Pulse 63   Temp 98 °F (36.7 °C) (Oral)   Resp 14   Ht 5' (1.524 m)   Wt 124 lb 9 oz (56.5 kg)   SpO2 97%   BMI 24.33 kg/m²     General appearance: No apparent distress, appears stated age and cooperative. HEENT: Pupils equal, round, and reactive to light. Conjunctivae/corneas clear.   Neck: Supple, with full range of motion. No jugular venous distention. Trachea midline. Respiratory:  Normal respiratory effort. Clear to auscultation, bilaterally without Rales/Wheezes/Rhonchi. Cardiovascular: Regular rate and rhythm with normal S1/S2 without murmurs, rubs or gallops. Abdomen: Soft, non-tender, non-distended with normal bowel sounds. Musculoskeletal: No clubbing, cyanosis or edema bilaterally. Full range of motion without deformity. Skin: Skin color, texture, turgor normal.  No rashes or lesions. Neurologic:  Neurovascularly intact without any focal sensory/motor deficits. Cranial nerves: II-XII intact, grossly non-focal.  Psychiatric: Alert and oriented, thought content appropriate, normal insight  Capillary Refill: Brisk,3 seconds, normal   Peripheral Pulses: +2 palpable, equal bilaterally       Labs:   Recent Labs     10/19/21  0754 10/20/21  0758 10/21/21  0627   WBC 4.2 3.8* 4.1   HGB 9.8* 10.3* 9.6*   HCT 30.7* 31.6* 29.4*    180 165     Recent Labs     10/20/21  0758 10/20/21  1536 10/21/21  0626    135* 135*   K 3.9 4.2 3.9    102 99   CO2 23 18* 23   BUN 27* 27* 29*   CREATININE 2.5* 2.7* 2.3*   CALCIUM 9.0 9.2 8.7   PHOS  --  4.2 3.2     No results for input(s): AST, ALT, BILIDIR, BILITOT, ALKPHOS in the last 72 hours. No results for input(s): INR in the last 72 hours. Recent Labs     10/18/21  1543   TROPONINI <0.01       Urinalysis:      Lab Results   Component Value Date    NITRU Negative 10/20/2021    WBCUA 3 10/20/2021    BACTERIA RARE 10/20/2021    RBCUA 0-2 10/20/2021    BLOODU Negative 10/20/2021    SPECGRAV >1.030 10/20/2021    GLUCOSEU Negative 10/20/2021    GLUCOSEU NEGATIVE 05/14/2012       Radiology:  CT CHEST PULMONARY EMBOLISM W CONTRAST   Final Result   1. Negative for pulmonary embolus. 2. Mild bilateral pulmonary fibrosis. 3. Chronic SVC occlusion with associated venous collateralization. XR CHEST (2 VW)   Final Result   No acute process. Assessment/Plan:    Active Hospital Problems    Diagnosis     Chest pain [R07.9]        Chest pain - non anginal  Extensive history of CAD and prior PTCA stenting. on Ranexa for angina. Most recent stress test in June was negative. - serial trop - negative. - EKG with no new ST-T changes.     - Discussed with Cardiology - no further evaluation indicated. - CTA - no PE, chronic SVC occlusion with collateralization. No complaints of pain today. KOLBY not POA. Possibly related to hemodynamic shifts of Labile BP. BP was elevated on presentation - then low normal.   Stopped amlodipine. Decreased dose BB - this was held past 2 days. Check UA - suggestive of prerenal process. Stop torsemide, stop fluid restriction  asked nephrology to get involved. Started gentle IVF - Cr improving. Diastolic CHF. Patient is not in exacerbation. Torsemide on hold. Stop fluid restriction. See above.        Atrial fibrillation. Continue metoprolol - dose changed as above. Patient is not on anticoagulation due to Hx of GI bleed.        Diabetes mellitus. Reduced home glargine dose. Glargine 5 units with sliding scale       Depression. Continue home dose of quetiapine  Continue Cymbalta       GERD   Continue pantoprazole       DVT Prophylaxis: Lovenox/   Diet: ADULT DIET; Regular; 4 carb choices (60 gm/meal); Low Sodium (2 gm); 1200 ml  Code Status: Full Code  PT/OT Eval Status: pending clinical stability.          Dispo - pending renal function improvement (patient lives alone in a senior living apartment).         Gregorio Jin MD

## 2021-10-21 NOTE — CONSULTS
Discussed with hospitalist.  No ECG changes  No troponin elevation. Non cardiac chest pain  No evidence of ACS. Patient has had multiple hospital admissions for chest pain. She has chronic angina. Continue current medical therapy. No further cardiac work-up required. There is no need for invasive cardiovascular therapies including left heart catheterization. Please call with any questions or concerns.

## 2021-10-21 NOTE — PROGRESS NOTES
Saez placed without complications. Pt tolerated well. Pt resting comfortably in bed. Pt denies any further needs at this time. Will continue to monitor.

## 2021-10-21 NOTE — PROGRESS NOTES
Nephrology Progress Note   [facility]      Chief Complaint: Chest pain     History of Present Illness: Ms Mich Nicole is a 60 y.o. female with a past medical history significant for CAD with multipleinterventions (DEANGELO to RCA on 06/02/2016. DEANGELO to prox RCA and left circumflex on 07/12/2018. Balloon angioplasty alone to OM1 and stent to right PDA on 11/30/2020). A Fib, chronic diastolic CHF, DM, hypertension, hyperlipidemia, history of CVA, COPD who presented to the ED with chest pressure that did not improve with Nitro. Patient had a CT scan with contrast on 10/18/21 to evaluate chest pain. CT was negative for PE but creatinine has been trending up since. Of note patient was also on Torsemide on admission and that was discontinued today after she received a dose in the morning    Subjective:    Resting in bed; NAD; No shortness of breath; chest pain appears to be improving. States she is not urinating    ROS: + Chest pain - improved; no shortness of breath; no lower extremity edema. No obstructive urinary symptoms.  All other ROS negative    Scheduled Meds:   lidocaine 1 % injection  5 mL IntraDERmal Once    sodium chloride flush  5-40 mL IntraVENous 2 times per day    metoprolol succinate  50 mg Oral Daily    aspirin  81 mg Oral Daily    atorvastatin  80 mg Oral Nightly    budesonide-formoterol  2 puff Inhalation Daily    clopidogrel  75 mg Oral Daily    DULoxetine  60 mg Oral Daily    insulin glargine  5 Units SubCUTAneous BID    pantoprazole  40 mg Oral QAM AC    QUEtiapine  25 mg Oral Nightly    ranolazine  1,000 mg Oral BID    sodium chloride flush  5-40 mL IntraVENous 2 times per day    enoxaparin  40 mg SubCUTAneous Daily    insulin lispro  0-6 Units SubCUTAneous TID WC    insulin lispro  0-3 Units SubCUTAneous Nightly        sodium chloride      sodium bicarbonate infusion 100 mL/hr at 10/21/21 0614    sodium chloride      dextrose         PRN Meds:.sodium chloride flush, sodium chloride, oxyCODONE-acetaminophen, nitroGLYCERIN, tiZANidine, sodium chloride flush, sodium chloride, ondansetron **OR** ondansetron, polyethylene glycol, acetaminophen **OR** acetaminophen, nitroGLYCERIN, glucose, dextrose, glucagon (rDNA), dextrose    Physical Exam:    TEMPERATURE:  Current - Temp: 98 °F (36.7 °C); Max - Temp  Av.9 °F (36.6 °C)  Min: 97.4 °F (36.3 °C)  Max: 98.5 °F (36.9 °C)  RESPIRATIONS RANGE: Resp  Avg: 15.5  Min: 12  Max: 18  PULSE RANGE: Pulse  Av.2  Min: 59  Max: 68  BLOOD PRESSURE RANGE:  Systolic (58RMT), QTN:987 , Min:141 , NICHOLAS:143   ; Diastolic (03QND), DBJ:19, Min:57, Max:78    24HR INTAKE/OUTPUT:      Intake/Output Summary (Last 24 hours) at 10/21/2021 1126  Last data filed at 10/21/2021 0615  Gross per 24 hour   Intake 402.19 ml   Output 450 ml   Net -47.81 ml       Patient Vitals for the past 96 hrs (Last 3 readings):   Weight   10/21/21 0550 124 lb 9 oz (56.5 kg)   10/20/21 0456 119 lb 0.8 oz (54 kg)   10/19/21 0600 119 lb 0.8 oz (54 kg)       General: Alert, Awake, NAD  HEENT: Normocephalic, atraumatic, Nose and ears appear externally without deformity, MMM  Neck: No Thyromegaly, Trachea is midline, No Carotid bruit  Eyes: EOMI, RIAN  Chest: clear to auscultation, no intercostal retractions  CVS: RRR, no murmur, no rub  Abdomen: soft, non tender, no organomegaly, no bruit appreciated  Extremities: no edema, no cyanosis.   Skin: normal texture, normal skin turgor, no rash  Musculoskeletal: normal ROM, no joint swelling, no visible deformity  Neurological: CN intact, no focal motor neurological deficit  Psych: normal affect    Past Medical History:   Diagnosis Date    Acid reflux     Anemia     Anxiety and depression     Arthritis     Asthma     Atrial fibrillation (HCC)     CAD (coronary artery disease) 12/3/2012    Cerebral artery occlusion with cerebral infarction [facility])     TIA\"\"S--right sided weakness & headache    CHF (congestive heart failure) (HCC)     Chronic kidney disease--stage III     40% kidney function    COPD (chronic obstructive pulmonary disease) (HCC)     DM2 [facility]diabetes mellitus, type 2) ([facility] [address]     Dysarthria     Fibromyalgia 6/7/2016    Headache(784.0) 2/19/2013    Hemisensory loss     History of blood transfusion 11/2020    pt denies having transfusion reaction    Hyperlipidemia     Hypertension     IBS (irritable bowel syndrome)     Inferior vena cava occlusion (HCC)     Keratitis     MI, old     Neuropathy     Superior vena cava obstruction     Temporal arteritis ([facility] [address] 2/24/2014    Wears glasses      Past Surgical History:   Procedure Laterality Date    ABLATION OF DYSRHYTHMIC FOCUS  1999  and 11/2020    times 2    ARTERY BIOPSY Right 04/23/2014    RIGHT TEMPORAL ARTERY BIOPSY    CAROTID ARTERY SURGERY Left     clean up per pt    CATARACT REMOVAL Bilateral     CHOLECYSTECTOMY      COLONOSCOPY N/A 4/9/2021    COLONOSCOPY WITH BIOPSY performed by Trisha Solorzano MD at [address] [facility] 10/15/2021    COLONOSCOPY performed by Trisha Solorzano MD at [facility] 19 2020    HYSTERECTOMY      JOINT REPLACEMENT Right     KNEE ARTHROSCOPY Right     PTCA  10/2019    LAD and RCA inrtervention    TUNNELED VENOUS PORT PLACEMENT      left thigh. SMART PORT-----Removed--total of 4 port placement and removal    UPPER GASTROINTESTINAL ENDOSCOPY N/A 7/6/2020    EGD DIAGNOSTIC ONLY performed by Zuly Joseph MD at [address] [place] [address] Road History   Problem Relation Age of Onset    Diabetes Mother     High Cholesterol Mother     Stroke Mother     Cancer Mother     High Blood Pressure Mother     No Known Problems Paternal Grandfather         lung issues      Social History     Socioeconomic History    Marital status:       Spouse name: Not on file    Number of children: 6    Years of education: Not on file    Highest education level: Not on file   Occupational History    Not on file   Tobacco Use    Smoking status: Former Smoker     Packs/day: 0.50     Years: 35.00     Pack years: 17.50     Types: Cigarettes     Quit date: 7/1/2018     Years since quitting: 3.3    Smokeless tobacco: Never Used    Tobacco comment: 5/13/15 has not smoked since hospitalization - kh   Vaping Use    Vaping Use: Never used   Substance and Sexual Activity    Alcohol use: No     Alcohol/week: 0.0 standard drinks    Drug use: Never    Sexual activity: Not Currently     Partners: Male   Other Topics Concern    Not on file   Social History Narrative    Not on file     Social Determinants of Health     Financial Resource Strain:     Difficulty of Paying Living Expenses:    Food Insecurity:     Worried About Running Out of Food in the Last Year:     Ran Out of Food in the Last Year:    Transportation Needs:     Lack of Transportation (Medical):  Lack of Transportation (Non-Medical):    Physical Activity:     Days of Exercise per Week:     Minutes of Exercise per Session:    Stress:     Feeling of Stress :    Social Connections:     Frequency of Communication with Friends and Family:     Frequency of Social Gatherings with Friends and Family:     Attends Holiness Services:     Active Member of Clubs or Organizations:     Attends Club or Organization Meetings:     Marital Status:    Intimate Partner Violence:     Fear of Current or Ex-Partner:     Emotionally Abused:     Physically Abused:     Sexually Abused:           Allergies:  No Known Allergies       LAB DATA:    CBC:   Lab Results   Component Value Date    WBC 4.1 10/21/2021    RBC 3.04 10/21/2021    HGB 9.6 10/21/2021    HCT 29.4 10/21/2021    MCV 96.7 10/21/2021    MCH 31.6 10/21/2021    MCHC 32.7 10/21/2021    RDW 14.6 10/21/2021     10/21/2021    MPV 8.4 10/21/2021     BMP:    Lab Results   Component Value Date     10/21/2021    K 3.9 10/21/2021    K 4.3 10/18/2021    CL 99 10/21/2021    CO2 23 10/21/2021    BUN 29 10/21/2021    CREATININE 2.3 10/21/2021    CALCIUM 8.7 10/21/2021    GFRAA 26 10/21/2021    GFRAA 60 05/23/2013    LABGLOM 21 10/21/2021    GLUCOSE 87 10/21/2021     Ionized Calcium:  No results found for: IONCA  Magnesium:    Lab Results   Component Value Date    MG 1.80 10/21/2021     Phosphorus:    Lab Results   Component Value Date    PHOS 3.2 10/21/2021     U/A:    Lab Results   Component Value Date    COLORU DK YELLOW 10/20/2021    PHUR 5.0 10/20/2021    LABCAST 20-40 Hyaline 07/06/2017    WBCUA 3 10/20/2021    RBCUA 0-2 10/20/2021    MUCUS 1+ 05/23/2013    YEAST Rare Yeast 05/14/2012    BACTERIA RARE 10/20/2021    CLARITYU CLOUDY 10/20/2021    SPECGRAV >1.030 10/20/2021    LEUKOCYTESUR TRACE 10/20/2021    UROBILINOGEN 0.2 10/20/2021    BILIRUBINUR SMALL 10/20/2021    BILIRUBINUR NEGATIVE 05/14/2012    BLOODU Negative 10/20/2021    GLUCOSEU Negative 10/20/2021    GLUCOSEU NEGATIVE 05/14/2012    AMORPHOUS Rare 12/11/2014         IMPRESSION/RECOMMENDATIONS:      Active Problems:    Chest pain  Resolved Problems:    * No resolved hospital problems. *    1. KOLBY - Likely multifactorial (contrast exposure and continued use of loop diuretic)  - Loop diuretic was stopped  - continue IVF - creatinine starting to trend down  - Patient states she is not urinating - place gill  - no immediate indication for RRT   - Avoid exposure to Nephrotoxic agents    2. Acidosis - Added HCO to IVF - improving    3. Anemia - stable    4.  HTN - modifications have been made to antihypertensive regimen because of relative hypotension  - continue current medications

## 2021-10-21 NOTE — PROGRESS NOTES
Physician Progress Note      Johnathan Sanchez  CSN #:                  [id number]  :                       1955  ADMIT DATE:       10/18/2021 7:48 AM  DISCH DATE:  RESPONDING  PROVIDER #:        Gómez Katz MD          QUERY TEXT:    Pt with GERD admitted with chest pain. Per cardiology consult note on 10/21   both non-cardiac chest pain and CAD with chronic angina are documented. If   possible, please document in progress notes and discharge summary if you are   evaluating and/or treating any of the following: The medical record reflects the following:  Risk Factors: CAD, GERD  Clinical Indicators:  both non-cardiac chest pain and CAD with chronic angina   are documented; Troponin < 0.01 x 2  Treatment: Protonix, NTG, cardiology consult    Thank you,  Hamilton Diehl, RN, BSN, CCDS  [email or web address]  Options provided:  -- Chest pain due to CAD with chronic angina  -- Chest pain due to GERD  -- Other - I will add my own diagnosis  -- Disagree - Not applicable / Not valid  -- Disagree - Clinically unable to determine / Unknown  -- Refer to Clinical Documentation Reviewer    PROVIDER RESPONSE TEXT:    This patient has chest pain due to GERD.     Query created by: Emanuel Odom on 10/21/2021 10:30 AM      Electronically signed by:  Gómez Katz MD 10/21/2021 2:06 PM

## 2021-10-21 NOTE — PLAN OF CARE
Problem: Pain:  Goal: Pain level will decrease  Description: Pain level will decrease  10/21/2021 0943 by Peter Quach RN  Outcome: Ongoing  10/21/2021 0204 by Geetha Ellsworth RN  Outcome: Ongoing  Goal: Control of acute pain  Description: Control of acute pain  Outcome: Ongoing  Goal: Control of chronic pain  Description: Control of chronic pain  Outcome: Ongoing     Problem: Falls - Risk of:  Goal: Will remain free from falls  Description: Will remain free from falls  10/21/2021 0943 by Peter Quach RN  Outcome: Ongoing  10/21/2021 0204 by Geetha Ellsworth RN  Outcome: Ongoing  Goal: Absence of physical injury  Description: Absence of physical injury  10/21/2021 0943 by Peter Quach RN  Outcome: Ongoing  10/21/2021 0204 by Geetha Ellsworth RN  Outcome: Ongoing

## 2021-10-21 NOTE — CONSULTS
Per Dr. Priyank Lester, he spoke to Dr. Manuel Fairly regarding patient. No cardiac teaching needed.   Crista Collins MSN, RN [facility]  [phone]

## 2021-10-22 LAB
ALBUMIN SERPL-MCNC: 3 G/DL (ref 3.4–5)
ANION GAP SERPL CALCULATED.3IONS-SCNC: 14 MMOL/L (ref 3–16)
BASOPHILS ABSOLUTE: 0 K/UL (ref 0–0.2)
BASOPHILS RELATIVE PERCENT: 0.5 %
BUN BLDV-MCNC: 17 MG/DL (ref 7–20)
CALCIUM SERPL-MCNC: 8.4 MG/DL (ref 8.3–10.6)
CHLORIDE BLD-SCNC: 98 MMOL/L (ref 99–110)
CO2: 23 MMOL/L (ref 21–32)
CREAT SERPL-MCNC: 1.5 MG/DL (ref 0.6–1.2)
EKG ATRIAL RATE: 56 BPM
EKG DIAGNOSIS: NORMAL
EKG P AXIS: -1 DEGREES
EKG P-R INTERVAL: 168 MS
EKG Q-T INTERVAL: 376 MS
EKG QRS DURATION: 94 MS
EKG QTC CALCULATION (BAZETT): 362 MS
EKG R AXIS: 35 DEGREES
EKG T AXIS: 84 DEGREES
EKG VENTRICULAR RATE: 56 BPM
EOSINOPHILS ABSOLUTE: 0.1 K/UL (ref 0–0.6)
EOSINOPHILS RELATIVE PERCENT: 3 %
GFR AFRICAN AMERICAN: 42
GFR NON-AFRICAN AMERICAN: 35
GLUCOSE BLD-MCNC: 148 MG/DL (ref 70–99)
GLUCOSE BLD-MCNC: 149 MG/DL (ref 70–99)
GLUCOSE BLD-MCNC: 155 MG/DL (ref 70–99)
GLUCOSE BLD-MCNC: 46 MG/DL (ref 70–99)
GLUCOSE BLD-MCNC: 61 MG/DL (ref 70–99)
GLUCOSE BLD-MCNC: 83 MG/DL (ref 70–99)
HCT VFR BLD CALC: 32.6 % (ref 36–48)
HEMOGLOBIN: 10.4 G/DL (ref 12–16)
LYMPHOCYTES ABSOLUTE: 1.1 K/UL (ref 1–5.1)
LYMPHOCYTES RELATIVE PERCENT: 28.2 %
MAGNESIUM: 1.7 MG/DL (ref 1.8–2.4)
MCH RBC QN AUTO: 31.4 PG (ref 26–34)
MCHC RBC AUTO-ENTMCNC: 32 G/DL (ref 31–36)
MCV RBC AUTO: 98.1 FL (ref 80–100)
MONOCYTES ABSOLUTE: 0.2 K/UL (ref 0–1.3)
MONOCYTES RELATIVE PERCENT: 6.1 %
NEUTROPHILS ABSOLUTE: 2.5 K/UL (ref 1.7–7.7)
NEUTROPHILS RELATIVE PERCENT: 62.2 %
PDW BLD-RTO: 14.7 % (ref 12.4–15.4)
PERFORMED ON: ABNORMAL
PERFORMED ON: NORMAL
PHOSPHORUS: 2.5 MG/DL (ref 2.5–4.9)
PLATELET # BLD: 121 K/UL (ref 135–450)
PMV BLD AUTO: 9.1 FL (ref 5–10.5)
POTASSIUM SERPL-SCNC: 3.5 MMOL/L (ref 3.5–5.1)
RBC # BLD: 3.32 M/UL (ref 4–5.2)
SODIUM BLD-SCNC: 135 MMOL/L (ref 136–145)
WBC # BLD: 4 K/UL (ref 4–11)

## 2021-10-22 PROCEDURE — 94761 N-INVAS EAR/PLS OXIMETRY MLT: CPT

## 2021-10-22 PROCEDURE — 2580000003 HC RX 258: Performed by: INTERNAL MEDICINE

## 2021-10-22 PROCEDURE — 2580000003 HC RX 258: Performed by: STUDENT IN AN ORGANIZED HEALTH CARE EDUCATION/TRAINING PROGRAM

## 2021-10-22 PROCEDURE — 83735 ASSAY OF MAGNESIUM: CPT

## 2021-10-22 PROCEDURE — 1200000000 HC SEMI PRIVATE

## 2021-10-22 PROCEDURE — 94640 AIRWAY INHALATION TREATMENT: CPT

## 2021-10-22 PROCEDURE — 85025 COMPLETE CBC W/AUTO DIFF WBC: CPT

## 2021-10-22 PROCEDURE — 93010 ELECTROCARDIOGRAM REPORT: CPT | Performed by: INTERNAL MEDICINE

## 2021-10-22 PROCEDURE — 36415 COLL VENOUS BLD VENIPUNCTURE: CPT

## 2021-10-22 PROCEDURE — 93005 ELECTROCARDIOGRAM TRACING: CPT | Performed by: INTERNAL MEDICINE

## 2021-10-22 PROCEDURE — 80069 RENAL FUNCTION PANEL: CPT

## 2021-10-22 PROCEDURE — 6360000002 HC RX W HCPCS: Performed by: INTERNAL MEDICINE

## 2021-10-22 PROCEDURE — 6370000000 HC RX 637 (ALT 250 FOR IP): Performed by: INTERNAL MEDICINE

## 2021-10-22 PROCEDURE — 6360000002 HC RX W HCPCS: Performed by: STUDENT IN AN ORGANIZED HEALTH CARE EDUCATION/TRAINING PROGRAM

## 2021-10-22 PROCEDURE — 6370000000 HC RX 637 (ALT 250 FOR IP): Performed by: STUDENT IN AN ORGANIZED HEALTH CARE EDUCATION/TRAINING PROGRAM

## 2021-10-22 RX ORDER — SODIUM CHLORIDE 9 MG/ML
INJECTION, SOLUTION INTRAVENOUS CONTINUOUS
Status: DISCONTINUED | OUTPATIENT
Start: 2021-10-22 | End: 2021-10-23 | Stop reason: HOSPADM

## 2021-10-22 RX ORDER — MAGNESIUM SULFATE IN WATER 40 MG/ML
2000 INJECTION, SOLUTION INTRAVENOUS ONCE
Status: COMPLETED | OUTPATIENT
Start: 2021-10-22 | End: 2021-10-22

## 2021-10-22 RX ORDER — AMLODIPINE BESYLATE 10 MG/1
10 TABLET ORAL DAILY
Status: DISCONTINUED | OUTPATIENT
Start: 2021-10-22 | End: 2021-10-23 | Stop reason: HOSPADM

## 2021-10-22 RX ADMIN — METOPROLOL SUCCINATE 50 MG: 50 TABLET, EXTENDED RELEASE ORAL at 08:36

## 2021-10-22 RX ADMIN — TIZANIDINE 2 MG: 4 TABLET ORAL at 21:11

## 2021-10-22 RX ADMIN — AMLODIPINE BESYLATE 10 MG: 10 TABLET ORAL at 09:51

## 2021-10-22 RX ADMIN — INSULIN LISPRO 1 UNITS: 100 INJECTION, SOLUTION INTRAVENOUS; SUBCUTANEOUS at 17:15

## 2021-10-22 RX ADMIN — CLOPIDOGREL BISULFATE 75 MG: 75 TABLET ORAL at 08:36

## 2021-10-22 RX ADMIN — ONDANSETRON 4 MG: 2 INJECTION INTRAMUSCULAR; INTRAVENOUS at 07:46

## 2021-10-22 RX ADMIN — DULOXETINE HYDROCHLORIDE 60 MG: 60 CAPSULE, DELAYED RELEASE ORAL at 08:36

## 2021-10-22 RX ADMIN — QUETIAPINE FUMARATE 25 MG: 25 TABLET ORAL at 21:11

## 2021-10-22 RX ADMIN — INSULIN LISPRO 1 UNITS: 100 INJECTION, SOLUTION INTRAVENOUS; SUBCUTANEOUS at 21:13

## 2021-10-22 RX ADMIN — SODIUM CHLORIDE, PRESERVATIVE FREE 10 ML: 5 INJECTION INTRAVENOUS at 10:45

## 2021-10-22 RX ADMIN — INSULIN GLARGINE 5 UNITS: 100 INJECTION, SOLUTION SUBCUTANEOUS at 08:57

## 2021-10-22 RX ADMIN — MAGNESIUM SULFATE HEPTAHYDRATE 2000 MG: 40 INJECTION, SOLUTION INTRAVENOUS at 10:55

## 2021-10-22 RX ADMIN — RANOLAZINE 1000 MG: 500 TABLET, FILM COATED, EXTENDED RELEASE ORAL at 08:36

## 2021-10-22 RX ADMIN — ASPIRIN 81 MG CHEWABLE TABLET 81 MG: 81 TABLET CHEWABLE at 08:36

## 2021-10-22 RX ADMIN — INSULIN LISPRO 1 UNITS: 100 INJECTION, SOLUTION INTRAVENOUS; SUBCUTANEOUS at 12:48

## 2021-10-22 RX ADMIN — PANTOPRAZOLE SODIUM 40 MG: 40 TABLET, DELAYED RELEASE ORAL at 06:20

## 2021-10-22 RX ADMIN — Medication 2 PUFF: at 09:41

## 2021-10-22 RX ADMIN — INSULIN GLARGINE 5 UNITS: 100 INJECTION, SOLUTION SUBCUTANEOUS at 21:14

## 2021-10-22 RX ADMIN — ATORVASTATIN CALCIUM 80 MG: 80 TABLET, FILM COATED ORAL at 21:11

## 2021-10-22 RX ADMIN — SODIUM CHLORIDE, PRESERVATIVE FREE 10 ML: 5 INJECTION INTRAVENOUS at 08:40

## 2021-10-22 RX ADMIN — RANOLAZINE 1000 MG: 500 TABLET, FILM COATED, EXTENDED RELEASE ORAL at 21:11

## 2021-10-22 RX ADMIN — OXYCODONE AND ACETAMINOPHEN 1 TABLET: 7.5; 325 TABLET ORAL at 16:15

## 2021-10-22 RX ADMIN — SODIUM CHLORIDE: 9 INJECTION, SOLUTION INTRAVENOUS at 10:54

## 2021-10-22 RX ADMIN — ENOXAPARIN SODIUM 40 MG: 40 INJECTION SUBCUTANEOUS at 08:36

## 2021-10-22 ASSESSMENT — PAIN SCALES - GENERAL
PAINLEVEL_OUTOF10: 0
PAINLEVEL_OUTOF10: 0
PAINLEVEL_OUTOF10: 6
PAINLEVEL_OUTOF10: 8

## 2021-10-22 ASSESSMENT — PAIN DESCRIPTION - LOCATION
LOCATION: CHEST
LOCATION: CHEST

## 2021-10-22 ASSESSMENT — PAIN DESCRIPTION - FREQUENCY
FREQUENCY: CONTINUOUS
FREQUENCY: CONTINUOUS

## 2021-10-22 ASSESSMENT — PAIN DESCRIPTION - DIRECTION: RADIATING_TOWARDS: LEFT ARM

## 2021-10-22 ASSESSMENT — PAIN DESCRIPTION - ONSET
ONSET: ON-GOING
ONSET: ON-GOING

## 2021-10-22 ASSESSMENT — PAIN DESCRIPTION - DESCRIPTORS
DESCRIPTORS: PRESSURE
DESCRIPTORS: PRESSURE

## 2021-10-22 ASSESSMENT — PAIN DESCRIPTION - PAIN TYPE
TYPE: ACUTE PAIN
TYPE: ACUTE PAIN

## 2021-10-22 ASSESSMENT — PAIN DESCRIPTION - PROGRESSION
CLINICAL_PROGRESSION: GRADUALLY IMPROVING
CLINICAL_PROGRESSION: NOT CHANGED

## 2021-10-22 ASSESSMENT — PAIN DESCRIPTION - ORIENTATION
ORIENTATION: MID
ORIENTATION: MID

## 2021-10-22 NOTE — PROGRESS NOTES
Hospitalist Progress Note      PCP: Joyce Worley MD    Date of Admission: 10/18/2021    Chief Complaint: chest pain awakened from sleep       Subjective:     Repeat chest pressure this am - pt reports she does not feel good today. BP elevated today. Renal function better. Medications:  Reviewed    Infusion Medications    sodium chloride 50 mL/hr at 10/22/21 1054    sodium chloride      sodium chloride      dextrose       Scheduled Medications    amLODIPine  10 mg Oral Daily    lidocaine 1 % injection  5 mL IntraDERmal Once    sodium chloride flush  5-40 mL IntraVENous 2 times per day    metoprolol succinate  50 mg Oral Daily    aspirin  81 mg Oral Daily    atorvastatin  80 mg Oral Nightly    budesonide-formoterol  2 puff Inhalation Daily    clopidogrel  75 mg Oral Daily    DULoxetine  60 mg Oral Daily    insulin glargine  5 Units SubCUTAneous BID    pantoprazole  40 mg Oral QAM AC    QUEtiapine  25 mg Oral Nightly    ranolazine  1,000 mg Oral BID    sodium chloride flush  5-40 mL IntraVENous 2 times per day    enoxaparin  40 mg SubCUTAneous Daily    insulin lispro  0-6 Units SubCUTAneous TID WC    insulin lispro  0-3 Units SubCUTAneous Nightly     PRN Meds: sodium chloride flush, sodium chloride, oxyCODONE-acetaminophen, nitroGLYCERIN, tiZANidine, sodium chloride flush, sodium chloride, ondansetron **OR** ondansetron, polyethylene glycol, acetaminophen **OR** acetaminophen, nitroGLYCERIN, glucose, dextrose, glucagon (rDNA), dextrose      Intake/Output Summary (Last 24 hours) at 10/22/2021 1546  Last data filed at 10/22/2021 0754  Gross per 24 hour   Intake 420 ml   Output 1100 ml   Net -680 ml       Physical Exam Performed:    BP (!) 146/71   Pulse 71   Temp 98.4 °F (36.9 °C) (Oral)   Resp 18   Ht 5' (1.524 m)   Wt 123 lb 7.3 oz (56 kg)   SpO2 98%   BMI 24.11 kg/m²     General appearance: No apparent distress, appears stated age and cooperative.   HEENT: Pupils equal, round, and reactive to light. Conjunctivae/corneas clear. Neck: Supple, with full range of motion. No jugular venous distention. Trachea midline. Respiratory:  Normal respiratory effort. Clear to auscultation, bilaterally without Rales/Wheezes/Rhonchi. Cardiovascular: Regular rate and rhythm with normal S1/S2 without murmurs, rubs or gallops. Abdomen: Soft, non-tender, non-distended with normal bowel sounds. Musculoskeletal: No clubbing, cyanosis or edema bilaterally. Full range of motion without deformity. Skin: Skin color, texture, turgor normal.  No rashes or lesions. Neurologic:  Neurovascularly intact without any focal sensory/motor deficits. Cranial nerves: II-XII intact, grossly non-focal.  Psychiatric: Alert and oriented, thought content appropriate, normal insight  Capillary Refill: Brisk,3 seconds, normal   Peripheral Pulses: +2 palpable, equal bilaterally       Labs:   Recent Labs     10/20/21  0758 10/21/21  0627 10/22/21  0649   WBC 3.8* 4.1 4.0   HGB 10.3* 9.6* 10.4*   HCT 31.6* 29.4* 32.6*    165 121*     Recent Labs     10/20/21  1536 10/21/21  0626 10/22/21  0649   * 135* 135*   K 4.2 3.9 3.5    99 98*   CO2 18* 23 23   BUN 27* 29* 17   CREATININE 2.7* 2.3* 1.5*   CALCIUM 9.2 8.7 8.4   PHOS 4.2 3.2 2.5     No results for input(s): AST, ALT, BILIDIR, BILITOT, ALKPHOS in the last 72 hours. No results for input(s): INR in the last 72 hours. No results for input(s): Amy Horn in the last 72 hours. Urinalysis:      Lab Results   Component Value Date    NITRU Negative 10/20/2021    WBCUA 3 10/20/2021    BACTERIA RARE 10/20/2021    RBCUA 0-2 10/20/2021    BLOODU Negative 10/20/2021    SPECGRAV >1.030 10/20/2021    GLUCOSEU Negative 10/20/2021    GLUCOSEU NEGATIVE 05/14/2012       Radiology:  CT CHEST PULMONARY EMBOLISM W CONTRAST   Final Result   1. Negative for pulmonary embolus. 2. Mild bilateral pulmonary fibrosis.    3. Chronic SVC occlusion with associated venous collateralization. XR CHEST (2 VW)   Final Result   No acute process. Assessment/Plan:    Active Hospital Problems    Diagnosis     Chest pain [R07.9]        Chest pain - non anginal  Extensive history of CAD and prior PTCA stenting. on Ranexa for angina. Most recent stress test in June was negative. - serial trop - negative. - EKG with no new ST-T changes.     - Discussed with Cardiology - no further evaluation indicated. - CTA - no PE, chronic SVC occlusion with collateralization. Repeat chest pain Friday am   - repeat EKG - non specific T wave abnormality.    - discussed with cardiology - recs for continued antianginal therapy and PRN pain control. KOLBY not POA. Possibly related to hemodynamic shifts of Labile BP. BP was elevated on presentation - then low normal.   Stopped amlodipine. Decreased dose BB - initially held for 48hrs till BP improved. Check UA - suggestive of prerenal process. Stopped torsemide, stopped fluid restriction  asked nephrology to get involved. Started gentle IVF - Cr and BP recovering with IVF. Diastolic CHF. Patient is not in exacerbation. Torsemide on hold. Stop fluid restriction. See above.        Atrial fibrillation. Continue metoprolol - dose changed as above. Patient is not on anticoagulation due to Hx of GI bleed.        Diabetes mellitus. Reduced home glargine dose. Glargine 5 units with sliding scale       Depression. Continue home dose of quetiapine  Continue Cymbalta       GERD   Continue pantoprazole       DVT Prophylaxis: Lovenox/   Diet: ADULT DIET; Regular; 4 carb choices (60 gm/meal); Low Sodium (2 gm); 1200 ml  Code Status: Full Code  PT/OT Eval Status: pending clinical stability.          Dispo - pending renal function improvement (patient lives alone in a senior living apartment).         Vinicio Robb MD

## 2021-10-22 NOTE — PROGRESS NOTES
Care Transition BPCI-A Episode created.  This patient has been identified as being eligible for the BPCI-A program. A review of the Final DRG and Unplanned Readmission risk score will be completed at discharge.  If the patient is identified upon discharge as high risk for unplanned readmission, the patient will be assigned to a BPCI-A nurse for 90 days follow-up post-discharge.      Nephrology Progress Note   [facility]. [facility]      Chief Complaint: Chest pain     History of Present Illness: Ms Oksana Mcgarry is a 60 y.o. female with a past medical history significant for CAD with multipleinterventions (DEANGELO to RCA on 06/02/2016. DEANGELO to prox RCA and left circumflex on 07/12/2018. Balloon angioplasty alone to OM1 and stent to right PDA on 11/30/2020). A Fib, chronic diastolic CHF, DM, hypertension, hyperlipidemia, history of CVA, COPD who presented to the ED with chest pressure that did not improve with Nitro. Patient had a CT scan with contrast on 10/18/21 to evaluate chest pain. CT was negative for PE but creatinine has been trending up since. Of note patient was also on Torsemide on admission and that was discontinued today after she received a dose in the morning    Subjective:    Resting in bed; NAD; No shortness of breath; chest pain appears to be improving. Saez in place with good UO    ROS: + Chest pain - improved; no shortness of breath; no lower extremity edema.  All other ROS negative    Scheduled Meds:   amLODIPine  10 mg Oral Daily    magnesium sulfate  2,000 mg IntraVENous Once    nitroglycerin  0.5 inch Topical 4 times per day    lidocaine 1 % injection  5 mL IntraDERmal Once    sodium chloride flush  5-40 mL IntraVENous 2 times per day    metoprolol succinate  50 mg Oral Daily    aspirin  81 mg Oral Daily    atorvastatin  80 mg Oral Nightly    budesonide-formoterol  2 puff Inhalation Daily    clopidogrel  75 mg Oral Daily    DULoxetine  60 mg Oral Daily    insulin glargine  5 Units SubCUTAneous BID    pantoprazole  40 mg Oral QAM AC    QUEtiapine  25 mg Oral Nightly    ranolazine  1,000 mg Oral BID    sodium chloride flush  5-40 mL IntraVENous 2 times per day    enoxaparin  40 mg SubCUTAneous Daily    insulin lispro  0-6 Units SubCUTAneous TID WC    insulin lispro  0-3 Units SubCUTAneous Nightly        sodium chloride      sodium chloride      dextrose         PRN Meds:.sodium chloride flush, sodium chloride, oxyCODONE-acetaminophen, nitroGLYCERIN, tiZANidine, sodium chloride flush, sodium chloride, ondansetron **OR** ondansetron, polyethylene glycol, acetaminophen **OR** acetaminophen, nitroGLYCERIN, glucose, dextrose, glucagon (rDNA), dextrose    Physical Exam:    TEMPERATURE:  Current - Temp: 97.4 °F (36.3 °C); Max - Temp  Av.7 °F (36.5 °C)  Min: 97.4 °F (36.3 °C)  Max: 98.1 °F (36.7 °C)  RESPIRATIONS RANGE: Resp  Avg: 15.2  Min: 14  Max: 16  PULSE RANGE: Pulse  Av  Min: 50  Max: 58  BLOOD PRESSURE RANGE:  Systolic (76EFY), AVQ:364 , Min:145 , SRQ:432   ; Diastolic (56RDO), KIX:37, Min:61, Max:80    24HR INTAKE/OUTPUT:      Intake/Output Summary (Last 24 hours) at 10/22/2021 1045  Last data filed at 10/22/2021 0754  Gross per 24 hour   Intake 660 ml   Output 1100 ml   Net -440 ml       Patient Vitals for the past 96 hrs (Last 3 readings):   Weight   10/22/21 0456 123 lb 7.3 oz (56 kg)   10/21/21 0550 124 lb 9 oz (56.5 kg)   10/20/21 0456 119 lb 0.8 oz (54 kg)       General: Alert, Awake, NAD  HEENT: Normocephalic, atraumatic, Nose and ears appear externally without deformity, MMM  Neck: No Thyromegaly, Trachea is midline, No Carotid bruit  Eyes: EOMI, RIAN  Chest: clear to auscultation, no intercostal retractions  CVS: RRR, no murmur, no rub  Abdomen: soft, non tender, no organomegaly, no bruit appreciated  Extremities: no edema, no cyanosis.   Skin: normal texture, normal skin turgor, no rash  Musculoskeletal: normal ROM, no joint swelling, no visible deformity  Neurological: CN intact, no focal motor neurological deficit  Psych: normal affect    Past Medical History:   Diagnosis Date    Acid reflux     Anemia     Anxiety and depression     Arthritis     Asthma     Atrial fibrillation (HCC)     CAD (coronary artery disease) 12/3/2012    Cerebral artery occlusion with cerebral infarction [facility])     TIA\"\"S--right sided weakness & headache    CHF (congestive heart failure) (HCC)     Chronic kidney disease--stage III     40% kidney function    COPD [facility]ronic obstructive [facility] disease) [facility]     DM2 (diabetes mellitus, type 2) ([facility]     Dysarthria     Fibromyalgia 6/7/2016    Headache(784.0) 2/19/2013    Hemisensory loss     History of blood transfusion 11/2020    pt denies having transfusion reaction    Hyperlipidemia     Hypertension     IBS (irritable bowel syndrome)     Inferior vena cava occlusion (HCC)     Keratitis     MI, old     Neuropathy     Superior vena cava obstruction     Temporal arteritis ([facility] 2/24/2014    Wears glasses      Past Surgical History:   Procedure Laterality Date    ABLATION OF DYSRHYTHMIC FOCUS  1999  and 11/2020    times 2    ARTERY BIOPSY Right 04/23/2014    RIGHT TEMPORAL ARTERY BIOPSY    CAROTID ARTERY SURGERY Left     clean up per pt    CATARACT REMOVAL Bilateral     CHOLECYSTECTOMY      COLONOSCOPY N/A 4/9/2021    COLONOSCOPY WITH BIOPSY performed by Chloe Henriquez MD at [address] [facility] 10/15/2021    COLONOSCOPY performed by Chloe Henriquez MD at [address] [place] [address]  2020    HYSTERECTOMY      JOINT REPLACEMENT Right     KNEE ARTHROSCOPY Right     PTCA  10/2019    LAD and RCA inrtervention    TUNNELED VENOUS PORT PLACEMENT      left thigh. SMART PORT-----Removed--total of 4 port placement and removal    UPPER GASTROINTESTINAL ENDOSCOPY N/A 7/6/2020    EGD DIAGNOSTIC ONLY performed by Reba Kunz MD at [address] [place] [address] Road History   Problem Relation Age of Onset    Diabetes Mother     High Cholesterol Mother     Stroke Mother     Cancer Mother     High Blood Pressure Mother     No Known Problems Paternal Grandfather         lung issues      Social History     Socioeconomic History    Marital status:       Spouse name: Not on file    Number of children: 6    Years of education: Not on file    Highest education level: 98 10/22/2021    CO2 23 10/22/2021    BUN 17 10/22/2021    CREATININE 1.5 10/22/2021    CALCIUM 8.4 10/22/2021    GFRAA 42 10/22/2021    GFRAA 60 05/23/2013    LABGLOM 35 10/22/2021    GLUCOSE 46 10/22/2021     Ionized Calcium:  No results found for: IONCA  Magnesium:    Lab Results   Component Value Date    MG 1.70 10/22/2021     Phosphorus:    Lab Results   Component Value Date    PHOS 2.5 10/22/2021     U/A:    Lab Results   Component Value Date    COLORU DK YELLOW 10/20/2021    PHUR 5.0 10/20/2021    LABCAST 20-40 Hyaline 07/06/2017    WBCUA 3 10/20/2021    RBCUA 0-2 10/20/2021    MUCUS 1+ 05/23/2013    YEAST Rare Yeast 05/14/2012    BACTERIA RARE 10/20/2021    CLARITYU CLOUDY 10/20/2021    SPECGRAV >1.030 10/20/2021    LEUKOCYTESUR TRACE 10/20/2021    UROBILINOGEN 0.2 10/20/2021    BILIRUBINUR SMALL 10/20/2021    BILIRUBINUR NEGATIVE 05/14/2012    BLOODU Negative 10/20/2021    GLUCOSEU Negative 10/20/2021    GLUCOSEU NEGATIVE 05/14/2012    AMORPHOUS Rare 12/11/2014         IMPRESSION/RECOMMENDATIONS:      Active Problems:    Chest pain  Resolved Problems:    * No resolved hospital problems. *    1. KOLBY - Likely multifactorial (contrast exposure and continued use of loop diuretic)  - Loop diuretic was stopped  - reduce rate of IVF - creatinine trending down  - Patient stated 10/21/21 that she was not urinating - gill placed - Good UO  - Avoid exposure to Nephrotoxic agents    2. Acidosis - Added HCO to IVF - improving    3. Anemia - stable    4.  HTN - BP elevated   - Amlodipine restarted 10/22/21   - monitor and modify medications accordingly  - Avoid Hypotension

## 2021-10-22 NOTE — PLAN OF CARE
Problem: Pain:  Goal: Pain level will decrease  Description: Pain level will decrease  10/22/2021 1304 by Elenita Soares RN  Outcome: Ongoing  10/22/2021 0445 by Pamella Montelongo RN  Outcome: Ongoing     Problem: Pain:  Goal: Control of acute pain  Description: Control of acute pain  10/22/2021 1304 by Elenita Soares RN  Outcome: Ongoing  10/22/2021 0445 by Pamella Montelongo RN  Outcome: Ongoing     Problem: Falls - Risk of:  Goal: Will remain free from falls  Description: Will remain free from falls  10/22/2021 1304 by Elenita Soares RN  Outcome: Ongoing  10/22/2021 0445 by Pamella Montelongo RN  Outcome: Ongoing     Problem: Falls - Risk of:  Goal: Absence of physical injury  Description: Absence of physical injury  10/22/2021 1304 by Elenita Soares RN  Outcome: Ongoing  10/22/2021 0445 by Pamella Montelongo RN  Outcome: Ongoing

## 2021-10-23 VITALS
TEMPERATURE: 97.7 F | DIASTOLIC BLOOD PRESSURE: 72 MMHG | RESPIRATION RATE: 16 BRPM | WEIGHT: 125 LBS | HEIGHT: 60 IN | BODY MASS INDEX: 24.54 KG/M2 | OXYGEN SATURATION: 97 % | SYSTOLIC BLOOD PRESSURE: 127 MMHG | HEART RATE: 57 BPM

## 2021-10-23 LAB
ALBUMIN SERPL-MCNC: 3 G/DL (ref 3.4–5)
ANION GAP SERPL CALCULATED.3IONS-SCNC: 10 MMOL/L (ref 3–16)
BASOPHILS ABSOLUTE: 0 K/UL (ref 0–0.2)
BASOPHILS RELATIVE PERCENT: 0.4 %
BUN BLDV-MCNC: 14 MG/DL (ref 7–20)
CALCIUM SERPL-MCNC: 8.3 MG/DL (ref 8.3–10.6)
CHLORIDE BLD-SCNC: 99 MMOL/L (ref 99–110)
CO2: 23 MMOL/L (ref 21–32)
CREAT SERPL-MCNC: 1.2 MG/DL (ref 0.6–1.2)
EOSINOPHILS ABSOLUTE: 0.2 K/UL (ref 0–0.6)
EOSINOPHILS RELATIVE PERCENT: 3.9 %
GFR AFRICAN AMERICAN: 55
GFR NON-AFRICAN AMERICAN: 45
GLUCOSE BLD-MCNC: 106 MG/DL (ref 70–99)
GLUCOSE BLD-MCNC: 138 MG/DL (ref 70–99)
GLUCOSE BLD-MCNC: 161 MG/DL (ref 70–99)
GLUCOSE BLD-MCNC: 225 MG/DL (ref 70–99)
HCT VFR BLD CALC: 30 % (ref 36–48)
HEMOGLOBIN: 9.9 G/DL (ref 12–16)
LYMPHOCYTES ABSOLUTE: 1.1 K/UL (ref 1–5.1)
LYMPHOCYTES RELATIVE PERCENT: 19.3 %
MAGNESIUM: 2.1 MG/DL (ref 1.8–2.4)
MCH RBC QN AUTO: 31.6 PG (ref 26–34)
MCHC RBC AUTO-ENTMCNC: 32.9 G/DL (ref 31–36)
MCV RBC AUTO: 95.9 FL (ref 80–100)
MONOCYTES ABSOLUTE: 0.3 K/UL (ref 0–1.3)
MONOCYTES RELATIVE PERCENT: 5.7 %
NEUTROPHILS ABSOLUTE: 4 K/UL (ref 1.7–7.7)
NEUTROPHILS RELATIVE PERCENT: 70.7 %
PDW BLD-RTO: 14.4 % (ref 12.4–15.4)
PERFORMED ON: ABNORMAL
PHOSPHORUS: 2.2 MG/DL (ref 2.5–4.9)
PLATELET # BLD: 147 K/UL (ref 135–450)
PLATELET SLIDE REVIEW: ADEQUATE
PMV BLD AUTO: 8.9 FL (ref 5–10.5)
POTASSIUM SERPL-SCNC: 3.9 MMOL/L (ref 3.5–5.1)
RBC # BLD: 3.13 M/UL (ref 4–5.2)
RBC # BLD: NORMAL 10*6/UL
SODIUM BLD-SCNC: 132 MMOL/L (ref 136–145)
VACUOLATED NEUTROPHILS: PRESENT
WBC # BLD: 5.6 K/UL (ref 4–11)

## 2021-10-23 PROCEDURE — 2580000003 HC RX 258: Performed by: INTERNAL MEDICINE

## 2021-10-23 PROCEDURE — 6370000000 HC RX 637 (ALT 250 FOR IP): Performed by: INTERNAL MEDICINE

## 2021-10-23 PROCEDURE — 6370000000 HC RX 637 (ALT 250 FOR IP): Performed by: STUDENT IN AN ORGANIZED HEALTH CARE EDUCATION/TRAINING PROGRAM

## 2021-10-23 PROCEDURE — 6360000002 HC RX W HCPCS: Performed by: STUDENT IN AN ORGANIZED HEALTH CARE EDUCATION/TRAINING PROGRAM

## 2021-10-23 PROCEDURE — 36415 COLL VENOUS BLD VENIPUNCTURE: CPT

## 2021-10-23 PROCEDURE — 94640 AIRWAY INHALATION TREATMENT: CPT

## 2021-10-23 PROCEDURE — 94760 N-INVAS EAR/PLS OXIMETRY 1: CPT

## 2021-10-23 PROCEDURE — 80069 RENAL FUNCTION PANEL: CPT

## 2021-10-23 PROCEDURE — 51798 US URINE CAPACITY MEASURE: CPT

## 2021-10-23 PROCEDURE — 83735 ASSAY OF MAGNESIUM: CPT

## 2021-10-23 PROCEDURE — 85025 COMPLETE CBC W/AUTO DIFF WBC: CPT

## 2021-10-23 RX ADMIN — OXYCODONE AND ACETAMINOPHEN 1 TABLET: 7.5; 325 TABLET ORAL at 12:19

## 2021-10-23 RX ADMIN — CLOPIDOGREL BISULFATE 75 MG: 75 TABLET ORAL at 08:50

## 2021-10-23 RX ADMIN — Medication 2 PUFF: at 08:35

## 2021-10-23 RX ADMIN — POLYETHYLENE GLYCOL 3350 17 G: 17 POWDER, FOR SOLUTION ORAL at 09:31

## 2021-10-23 RX ADMIN — METOPROLOL SUCCINATE 50 MG: 50 TABLET, EXTENDED RELEASE ORAL at 08:50

## 2021-10-23 RX ADMIN — OXYCODONE AND ACETAMINOPHEN 1 TABLET: 7.5; 325 TABLET ORAL at 05:00

## 2021-10-23 RX ADMIN — ASPIRIN 81 MG CHEWABLE TABLET 81 MG: 81 TABLET CHEWABLE at 08:50

## 2021-10-23 RX ADMIN — PANTOPRAZOLE SODIUM 40 MG: 40 TABLET, DELAYED RELEASE ORAL at 05:00

## 2021-10-23 RX ADMIN — SODIUM CHLORIDE: 9 INJECTION, SOLUTION INTRAVENOUS at 05:00

## 2021-10-23 RX ADMIN — INSULIN GLARGINE 5 UNITS: 100 INJECTION, SOLUTION SUBCUTANEOUS at 08:50

## 2021-10-23 RX ADMIN — AMLODIPINE BESYLATE 10 MG: 10 TABLET ORAL at 08:50

## 2021-10-23 RX ADMIN — DULOXETINE HYDROCHLORIDE 60 MG: 60 CAPSULE, DELAYED RELEASE ORAL at 08:50

## 2021-10-23 RX ADMIN — TIZANIDINE 2 MG: 4 TABLET ORAL at 09:31

## 2021-10-23 RX ADMIN — INSULIN LISPRO 2 UNITS: 100 INJECTION, SOLUTION INTRAVENOUS; SUBCUTANEOUS at 12:14

## 2021-10-23 RX ADMIN — ENOXAPARIN SODIUM 40 MG: 40 INJECTION SUBCUTANEOUS at 08:51

## 2021-10-23 RX ADMIN — SODIUM CHLORIDE, PRESERVATIVE FREE 10 ML: 5 INJECTION INTRAVENOUS at 08:51

## 2021-10-23 RX ADMIN — RANOLAZINE 1000 MG: 500 TABLET, FILM COATED, EXTENDED RELEASE ORAL at 08:51

## 2021-10-23 ASSESSMENT — PAIN DESCRIPTION - FREQUENCY
FREQUENCY: CONTINUOUS
FREQUENCY: INTERMITTENT

## 2021-10-23 ASSESSMENT — PAIN DESCRIPTION - ONSET
ONSET: ON-GOING
ONSET: ON-GOING

## 2021-10-23 ASSESSMENT — PAIN DESCRIPTION - DESCRIPTORS: DESCRIPTORS: CONSTANT

## 2021-10-23 ASSESSMENT — PAIN DESCRIPTION - ORIENTATION
ORIENTATION: MID
ORIENTATION: MID

## 2021-10-23 ASSESSMENT — PAIN SCALES - GENERAL
PAINLEVEL_OUTOF10: 4
PAINLEVEL_OUTOF10: 6
PAINLEVEL_OUTOF10: 7
PAINLEVEL_OUTOF10: 0
PAINLEVEL_OUTOF10: 6

## 2021-10-23 ASSESSMENT — PAIN DESCRIPTION - PAIN TYPE
TYPE: ACUTE PAIN
TYPE: ACUTE PAIN

## 2021-10-23 ASSESSMENT — PAIN - FUNCTIONAL ASSESSMENT: PAIN_FUNCTIONAL_ASSESSMENT: ACTIVITIES ARE NOT PREVENTED

## 2021-10-23 ASSESSMENT — PAIN DESCRIPTION - LOCATION
LOCATION: HEAD;CHEST
LOCATION: CHEST

## 2021-10-23 ASSESSMENT — PAIN DESCRIPTION - PROGRESSION
CLINICAL_PROGRESSION: GRADUALLY IMPROVING
CLINICAL_PROGRESSION: GRADUALLY IMPROVING

## 2021-10-23 NOTE — DISCHARGE SUMMARY
Hospital Medicine Discharge Summary    Patient ID: David Rothman      Patient's PCP: Sahil Larose MD    Admit Date: 10/18/2021     Discharge Date:   10/23/2021    Admitting Physician: Coral Deluna MD     Discharge Physician: Kasey Busch MD     Discharge Diagnoses: Active Hospital Problems    Diagnosis     Chest pain [R07.9]        The patient was seen and examined on day of discharge and this discharge summary is in conjunction with any daily progress note from day of discharge. Hospital Course: 59yo woman with Hx of DMII, CAD s/p PTCA in the past, chronic chest pain, presented to ED with chest pain. Chest pain - non anginal  Extensive history of CAD and prior PTCA stenting.    on Ranexa for angina. Most recent stress test in June was negative.               - serial trop - negative. - EKG with no new ST-T changes.               - Discussed with Cardiology - no further evaluation indicated. - CTA - no PE, chronic SVC occlusion with collateralization. Repeat chest pain Friday am              - repeat EKG - non specific T wave abnormality.               - discussed with cardiology - recs for continued antianginal therapy and PRN pain control.    - pain better today.            KOLBY not POA. Possibly related to hemodynamic shifts of Labile BP. BP was elevated on presentation - then low normal.   Stopped amlodipine. Decreased dose BB - initially held for 48hrs till BP improved. Check UA - suggestive of prerenal process. Stopped torsemide, stopped fluid restriction  asked nephrology to get involved. Started gentle IVF - Cr and BP recovering with IVF. Resumed BB and amlodipine. Will need repeat renal function in 1 week.         Diastolic CHF. Patient is not in exacerbation. Torsemide on hold. Stop fluid restriction. See above. Will cotn to hold torsemide till Monday - then resume if back to normal eating habits.    Repeat Renal Panel 1 week - ordered.          Atrial fibrillation. Continue metoprolol   Patient is not on anticoagulation due to Hx of GI bleed.         Diabetes mellitus. Reduced home glargine dose. Glargine 5 units with sliding scale  Resume PTA regimen on discharge.         Depression. Continue home dose of quetiapine  Continue Cymbalta        GERD   Continue pantoprazole          Physical Exam Performed:     BP (!) 124/42   Pulse 58   Temp 97 °F (36.1 °C) (Axillary)   Resp 18   Ht 5' (1.524 m)   Wt 125 lb (56.7 kg)   SpO2 97%   BMI 24.41 kg/m²       General appearance:  No apparent distress, appears stated age and cooperative. HEENT:  Normal cephalic, atraumatic without obvious deformity. Pupils equal, round, and reactive to light. Extra ocular muscles intact. Conjunctivae/corneas clear. Neck: Supple, with full range of motion. No jugular venous distention. Trachea midline. Respiratory:  Normal respiratory effort. Clear to auscultation, bilaterally without Rales/Wheezes/Rhonchi. Cardiovascular:  Regular rate and rhythm with normal S1/S2 without murmurs, rubs or gallops. Abdomen: Soft, non-tender, non-distended with normal bowel sounds. Musculoskeletal:  No clubbing, cyanosis or edema bilaterally. Full range of motion without deformity. Skin: Skin color, texture, turgor normal.  No rashes or lesions. Neurologic:  Neurovascularly intact without any focal sensory/motor deficits. Cranial nerves: II-XII intact, grossly non-focal.  Psychiatric:  Alert and oriented, thought content appropriate, normal insight  Capillary Refill: Brisk,< 3 seconds   Peripheral Pulses: +2 palpable, equal bilaterally       Labs:  For convenience and continuity at follow-up the following most recent labs are provided:      CBC:    Lab Results   Component Value Date    WBC 5.6 10/23/2021    HGB 9.9 10/23/2021    HCT 30.0 10/23/2021     10/23/2021       Renal:    Lab Results   Component Value Date     10/23/2021 K 3.9 10/23/2021    K 4.3 10/18/2021    CL 99 10/23/2021    CO2 23 10/23/2021    BUN 14 10/23/2021    CREATININE 1.2 10/23/2021    CALCIUM 8.3 10/23/2021    PHOS 2.2 10/23/2021         Significant Diagnostic Studies    Radiology:   CT CHEST PULMONARY EMBOLISM W CONTRAST   Final Result   1. Negative for pulmonary embolus. 2. Mild bilateral pulmonary fibrosis. 3. Chronic SVC occlusion with associated venous collateralization. XR CHEST (2 VW)   Final Result   No acute process. Consults:     IP CONSULT TO CARDIOLOGY  IP CONSULT TO NEPHROLOGY    Disposition:  home     Condition at Discharge: Stable    Discharge Instructions/Follow-up:  PCP 1 week.      Code Status:  Full Code     Activity: activity as tolerated    Diet: diabetic diet      Discharge Medications:     Current Discharge Medication List           Details   linaclotide (LINZESS) 145 MCG capsule Take 1 capsule by mouth every morning (before breakfast)  Qty: 30 capsule, Refills: 5      metoprolol succinate (TOPROL XL) 50 MG extended release tablet Take 50 mg by mouth nightly      Ubrogepant (UBRELVY) 50 MG TABS TAKE 1 TABLET BY MOUTH AS NEEDED AT START OF HEADACHES, CAN REPEAT IN 24 HOURS  Qty: 10 tablet, Refills: 0    Associated Diagnoses: Chronic pain syndrome      DULoxetine (CYMBALTA) 60 MG extended release capsule Take 1 capsule by mouth daily  Qty: 30 capsule, Refills: 1    Associated Diagnoses: Chronic pain syndrome; Chronic painful diabetic neuropathy (HCC)      QUEtiapine (SEROQUEL) 25 MG tablet Take 1 tablet by mouth nightly  Qty: 60 tablet, Refills: 1    Associated Diagnoses: Chronic pain syndrome      tiZANidine (ZANAFLEX) 4 MG tablet Take 0.5-1 tablets by mouth 2 times daily as needed (muscle spasms)  Qty: 60 tablet, Refills: 0    Associated Diagnoses: Chronic pain syndrome; Chronic painful diabetic neuropathy (HCC); DDD (degenerative disc disease), cervical; Tendinitis of right rotator cuff; DDD (degenerative disc 90 tablet, Refills: 5    Associated Diagnoses: Coronary artery disease involving native coronary artery of native heart without angina pectoris; Uncontrolled type 2 diabetes mellitus with complication, with long-term current use of insulin ([facility])      amLODIPine (NORVASC) 5 MG tablet Take 1 tablet by mouth 2 times daily  Qty: 180 tablet, Refills: 5    Associated Diagnoses: Essential hypertension      blood glucose test strips (TRUE METRIX BLOOD GLUCOSE TEST) strip 1 each by Does not apply route 2 times daily  Qty: 100 each, Refills: 5    Associated Diagnoses: Uncontrolled type 2 diabetes mellitus with complication, with long-term current use of insulin ([facility])      clopidogrel (PLAVIX) 75 MG tablet TAKE 1 TABLET BY MOUTH DA JULIEN  Qty: 90 tablet, Refills: 5    Associated Diagnoses: Coronary artery disease involving native coronary artery of native heart without angina pectoris      albuterol (PROVENTIL) (2.5 MG/3ML) 0.083% nebulizer solution Take 3 mLs by nebulization every 4 hours as needed for Wheezing  Qty: 120 each, Refills: 5    Associated Diagnoses: COPD with acute bronchitis ([facility])      budesonide-formoterol (SYMBICORT) 160-4.5 MCG/ACT AERO Inhale 2 puffs into the lungs daily  Qty: 2 Inhaler, Refills: 5    Associated Diagnoses: COPD with acute bronchitis ([facility])      albuterol sulfate HFA (VENTOLIN HFA) 108 (90 Base) MCG/ACT inhaler Inhale 2 puffs into the lungs every 4 hours as needed for Wheezing or Shortness of Breath  Qty: 3 Inhaler, Refills: 5    Associated Diagnoses: COPD with acute bronchitis ([facility])      ranolazine (RANEXA) 1000 MG extended release tablet Take 1 tablet by mouth 2 times daily  Qty: 60 tablet, Refills: 8    Associated Diagnoses: Coronary artery disease involving native coronary artery of native heart without angina pectoris       ! ! - Potential duplicate medications found. Please discuss with provider.           Time Spent on discharge is more than 30 minutes in the examination, evaluation, counseling and review of medications and discharge plan. Signed:    Dea Franks MD   10/23/2021      Thank you Demetrius Moses MD for the opportunity to be involved in this patient's care. If you have any questions or concerns please feel free to contact me at [phone].

## 2021-10-23 NOTE — DISCHARGE INSTR - DIET

## 2021-10-23 NOTE — PROGRESS NOTES
Nephrology Progress Note   [facility]. [facility]      Chief Complaint: Chest pain     History of Present Illness: Ms Shay Hylton is a 60 y.o. female with a past medical history significant for CAD with multipleinterventions (DEANGELO to RCA on 06/02/2016. DEANGELO to prox RCA and left circumflex on 07/12/2018. Balloon angioplasty alone to OM1 and stent to right PDA on 11/30/2020). A Fib, chronic diastolic CHF, DM, hypertension, hyperlipidemia, history of CVA, COPD who presented to the ED with chest pressure that did not improve with Nitro. Patient had a CT scan with contrast on 10/18/21 to evaluate chest pain. CT was negative for PE but creatinine has been trending up since. Of note patient was also on Torsemide on admission and that was discontinued today after she received a dose in the morning    Subjective:    Resting in bed; NAD; No shortness of breath; had CP this AM, pressure like    ROS: no shortness of breath; no lower extremity edema.  All other ROS negative    Scheduled Meds:   amLODIPine  10 mg Oral Daily    lidocaine 1 % injection  5 mL IntraDERmal Once    sodium chloride flush  5-40 mL IntraVENous 2 times per day    metoprolol succinate  50 mg Oral Daily    aspirin  81 mg Oral Daily    atorvastatin  80 mg Oral Nightly    budesonide-formoterol  2 puff Inhalation Daily    clopidogrel  75 mg Oral Daily    DULoxetine  60 mg Oral Daily    insulin glargine  5 Units SubCUTAneous BID    pantoprazole  40 mg Oral QAM AC    QUEtiapine  25 mg Oral Nightly    ranolazine  1,000 mg Oral BID    sodium chloride flush  5-40 mL IntraVENous 2 times per day    enoxaparin  40 mg SubCUTAneous Daily    insulin lispro  0-6 Units SubCUTAneous TID WC    insulin lispro  0-3 Units SubCUTAneous Nightly        sodium chloride 50 mL/hr at 10/23/21 0603    sodium chloride      sodium chloride      dextrose         PRN Meds:.sodium chloride flush, sodium chloride, oxyCODONE-acetaminophen, nitroGLYCERIN, tiZANidine, sodium chloride flush, sodium chloride, ondansetron **OR** ondansetron, polyethylene glycol, acetaminophen **OR** acetaminophen, nitroGLYCERIN, glucose, dextrose, glucagon (rDNA), dextrose    Physical Exam:    TEMPERATURE:  Current - Temp: 97 °F (36.1 °C); Max - Temp  Av.9 °F (36.6 °C)  Min: 97 °F (36.1 °C)  Max: 98.3 °F (36.8 °C)  RESPIRATIONS RANGE: Resp  Av.5  Min: 17  Max: 18  PULSE RANGE: Pulse  Av.4  Min: 53  Max: 85  BLOOD PRESSURE RANGE:  Systolic (04WER), WSZ:819 , Min:123 , IOT:993   ; Diastolic (97BJH), [place]:24, Min:42, Max:71    24HR INTAKE/OUTPUT:      Intake/Output Summary (Last 24 hours) at 10/23/2021 1335  Last data filed at 10/23/2021 1315  Gross per 24 hour   Intake 1115.74 ml   Output 700 ml   Net 415.74 ml       Patient Vitals for the past 96 hrs (Last 3 readings):   Weight   10/23/21 0445 125 lb (56.7 kg)   10/22/21 0456 123 lb 7.3 oz (56 kg)   10/21/21 0550 124 lb 9 oz (56.5 kg)       General: Alert, Awake, NAD  HEENT: Normocephalic, atraumatic, Nose and ears appear externally without deformity, MMM  Neck: No Thyromegaly, Trachea is midline, No Carotid bruit  Eyes: EOMI, RIAN  Chest: clear to auscultation, no intercostal retractions  CVS: RRR, no murmur, no rub  Abdomen: soft, non tender, no organomegaly, no bruit appreciated  Extremities: no edema, no cyanosis.   Skin: normal texture, normal skin turgor, no rash  Musculoskeletal: normal ROM, no joint swelling, no visible deformity  Neurological: CN intact, no focal motor neurological deficit  Psych: normal affect    Past Medical History:   Diagnosis Date    Acid reflux     Anemia     Anxiety and depression     Arthritis     Asthma     Atrial fibrillation (HCC)     CAD (coronary artery disease) 12/3/2012    Cerebral artery occlusion with cerebral infarction [facility])     TIA\"\"S--right sided weakness & headache    CHF (congestive heart failure) (HCC)     Chronic kidney disease--stage III     40% kidney function    [facility]ch[facility] obstructive [facility] disease) [facility]     DM2 (diabetes mellitus, type 2) ([facility]     Dysarthria     Fibromyalgia 6/7/2016    Headache(784.0) 2/19/2013    Hemisensory loss     History of blood transfusion 11/2020    pt denies having transfusion reaction    Hyperlipidemia     Hypertension     IBS (irritable bowel syndrome)     Inferior vena cava occlusion (HCC)     Keratitis     MI, old     Neuropathy     Superior vena cava obstruction     Temporal arteritis ([facility] 2/24/2014    Wears glasses      Past Surgical History:   Procedure Laterality Date    ABLATION OF DYSRHYTHMIC FOCUS  1999  and 11/2020    times 2    ARTERY BIOPSY Right 04/23/2014    RIGHT TEMPORAL ARTERY BIOPSY    CAROTID ARTERY SURGERY Left     clean up per pt    CATARACT REMOVAL Bilateral     CHOLECYSTECTOMY      COLONOSCOPY N/A 4/9/2021    COLONOSCOPY WITH BIOPSY performed by Pepper Correa MD at [address] [facility] 10/15/2021    COLONOSCOPY performed by [facility] MD at [facility] 19 2020    HYSTERECTOMY      JOINT REPLACEMENT Right     KNEE ARTHROSCOPY Right     PTCA  10/2019    LAD and RCA inrtervention    TUNNELED VENOUS PORT PLACEMENT      left thigh. SMART PORT-----Removed--total of 4 port placement and removal    UPPER GASTROINTESTINAL ENDOSCOPY N/A 7/6/2020    EGD DIAGNOSTIC ONLY performed by Andreas Maynard MD at [address] [place] [address] Road History   Problem Relation Age of Onset    Diabetes Mother     High Cholesterol Mother     Stroke Mother     Cancer Mother     High Blood Pressure Mother     No Known Problems Paternal Grandfather         lung issues      Social History     Socioeconomic History    Marital status:       Spouse name: Not on file    Number of children: 6    Years of education: Not on file    Highest education level: Not on file   Occupational History    Not on file   Tobacco Use    Smoking status: Former Smoker     Packs/day: 0.50     Years: 35.00     Pack years: 17.50     Types: Cigarettes     Quit date: 7/1/2018     Years since quitting: 3.3    Smokeless tobacco: Never Used    Tobacco comment: 5/13/15 has not smoked since hospitalization - kh   Vaping Use    Vaping Use: Never used   Substance and Sexual Activity    Alcohol use: No     Alcohol/week: 0.0 standard drinks    Drug use: Never    Sexual activity: Not Currently     Partners: Male   Other Topics Concern    Not on file   Social History Narrative    Not on file     Social Determinants of Health     Financial Resource Strain:     Difficulty of Paying Living Expenses:    Food Insecurity:     Worried About Running Out of Food in the Last Year:     Ran Out of Food in the Last Year:    Transportation Needs:     Lack of Transportation (Medical):  Lack of Transportation (Non-Medical):    Physical Activity:     Days of Exercise per Week:     Minutes of Exercise per Session:    Stress:     Feeling of Stress :    Social Connections:     Frequency of Communication with Friends and Family:     Frequency of Social Gatherings with Friends and Family:     Attends Zoroastrian Services:     Active Member of Clubs or Organizations:     Attends Club or Organization Meetings:     Marital Status:    Intimate Partner Violence:     Fear of Current or Ex-Partner:     Emotionally Abused:     Physically Abused:     Sexually Abused:           Allergies:  No Known Allergies       LAB DATA:    CBC:   Lab Results   Component Value Date    WBC 5.6 10/23/2021    RBC 3.13 10/23/2021    HGB 9.9 10/23/2021    HCT 30.0 10/23/2021    MCV 95.9 10/23/2021    MCH 31.6 10/23/2021    MCHC 32.9 10/23/2021    RDW 14.4 10/23/2021     10/23/2021    MPV 8.9 10/23/2021     BMP:    Lab Results   Component Value Date     10/23/2021    K 3.9 10/23/2021    K 4.3 10/18/2021    CL 99 10/23/2021    CO2 23 10/23/2021    BUN 14 10/23/2021    CREATININE 1.2 10/23/2021    CALCIUM 8.3 10/23/2021 GFRAA 55 10/23/2021    GFRAA 60 05/23/2013    LABGLOM 45 10/23/2021    GLUCOSE 161 10/23/2021     Ionized Calcium:  No results found for: IONCA  Magnesium:    Lab Results   Component Value Date    MG 2.10 10/23/2021     Phosphorus:    Lab Results   Component Value Date    PHOS 2.2 10/23/2021     U/A:    Lab Results   Component Value Date    COLORU DK YELLOW 10/20/2021    PHUR 5.0 10/20/2021    LABCAST 20-40 Hyaline 07/06/2017    WBCUA 3 10/20/2021    RBCUA 0-2 10/20/2021    MUCUS 1+ 05/23/2013    YEAST Rare Yeast 05/14/2012    BACTERIA RARE 10/20/2021    CLARITYU CLOUDY 10/20/2021    SPECGRAV >1.030 10/20/2021    LEUKOCYTESUR TRACE 10/20/2021    UROBILINOGEN 0.2 10/20/2021    BILIRUBINUR SMALL 10/20/2021    BILIRUBINUR NEGATIVE 05/14/2012    BLOODU Negative 10/20/2021    GLUCOSEU Negative 10/20/2021    GLUCOSEU NEGATIVE 05/14/2012    AMORPHOUS Rare 12/11/2014         IMPRESSION/RECOMMENDATIONS:      Active Problems:    Chest pain  Resolved Problems:    * No resolved hospital problems. *    1. KOLBY - Likely multifactorial (contrast exposure and continued use of loop diuretic)  - Loop diuretic was stopped  creatinine trending down 1.2 mg now     2. Acidosis - Added HCO to IVF - improving    3. Anemia - stable    4.  HTN - BP elevated   - Amlodipine restarted 10/22/21   - Avoid Hypotension

## 2021-10-23 NOTE — PLAN OF CARE
Problem: Pain:  Goal: Pain level will decrease  Description: Pain level will decrease  10/23/2021 0105 by Elaine Dahl RN  Outcome: Ongoing     Problem: Pain:  Goal: Control of acute pain  Description: Control of acute pain  10/23/2021 0105 by Elaine Dahl RN  Outcome: Ongoing     Problem: Pain:  Goal: Control of chronic pain  Description: Control of chronic pain  10/23/2021 0105 by Elanie Dahl RN  Outcome: Ongoing     Problem: Falls - Risk of:  Goal: Will remain free from falls  Description: Will remain free from falls  10/23/2021 0105 by Elaine Dahl RN  Outcome: Ongoing     Problem: Falls - Risk of:  Goal: Absence of physical injury  Description: Absence of physical injury  10/23/2021 0105 by Elaine Dahl RN  Outcome: Ongoing

## 2021-10-23 NOTE — PROGRESS NOTES
Patient voided a total of 100 ml mariana urine since Saez catheter removed around 1400 today. Per patient, she feels her bladder is empty. Bladder scan volume 15 ml. Secure message sent to Dr. Gisela Beltran to updated of above. Per Dr. Gisela Beltran, timothy to proceed with discharge. Patient updated.

## 2021-10-23 NOTE — PLAN OF CARE
Problem: Falls - Risk of:  Goal: Will remain free from falls  Description: Will remain free from falls  10/23/2021 0949 by Juan C Westbrook RN  Outcome: Ongoing   Wheels of bed locked x 4. Room free from clutter. Non-skid footwear intact. Call light in reach of patient at all times. Problem: Falls - Risk of:  Goal: Absence of physical injury  Description: Absence of physical injury  10/23/2021 0949 by Juan C Westbrook RN  Outcome: Ongoing     Problem: Pain:  Description: Pain management should include both nonpharmacologic and pharmacologic interventions. Goal: Pain level will decrease  Description: Pain level will decrease  10/23/2021 0949 by Juan C Westbrook RN  Outcome: Ongoing     Problem: Pain:  Description: Pain management should include both nonpharmacologic and pharmacologic interventions. Goal: Control of acute pain  Description: Control of acute pain  10/23/2021 0949 by Juan C Westbrook RN  Outcome: Ongoing  10/23/2021 0105 by Sarah Aguayo RN  Outcome: Ongoing     Problem: Pain:  Description: Pain management should include both nonpharmacologic and pharmacologic interventions.   Goal: Control of chronic pain  Description: Control of chronic pain  10/23/2021 0949 by Juan C Westbrook RN  Outcome: Ongoing

## 2021-10-23 NOTE — CARE COORDINATION
CASE MANAGEMENT DISCHARGE SUMMARY:    DISCHARGE DATE: 10/23/21    DISCHARGED TO: home with assistance from family and [facility]     TRANSPORTATION: daughter will provide transport     PREFERRED PHARMACY: Evelyn KENNYW called patient [facility] gauri at [phone] and left message regarding need to resume services. ZOYAW also faxed AVS to [phone].      Electronically signed by Cece Vick on 10/23/2021 at 3:01 PM

## 2021-10-29 ENCOUNTER — TELEPHONE (OUTPATIENT)
Dept: CARDIOLOGY CLINIC | Age: 66
End: 2021-10-29

## 2021-10-29 DIAGNOSIS — I25.10 CORONARY ARTERY DISEASE INVOLVING NATIVE CORONARY ARTERY OF NATIVE HEART WITHOUT ANGINA PECTORIS: ICD-10-CM

## 2021-10-29 RX ORDER — RANOLAZINE 1000 MG/1
1000 TABLET, EXTENDED RELEASE ORAL 2 TIMES DAILY
Qty: 60 TABLET | Refills: 8 | Status: SHIPPED | OUTPATIENT
Start: 2021-10-29

## 2021-10-29 NOTE — TELEPHONE ENCOUNTER
Ms. Vika Post is requesting a refill of     ranolazine (RANEXA) 1000 MG extended release tablet  Take 1 tablet by mouth 2 times daily,     Last OV with Dr. Araceli Can 7-8-21  On the recall list for January 2022    Please send refill to [facility] [place] pharmacy on [facility] [address].

## 2021-10-30 DIAGNOSIS — I25.10 CORONARY ARTERY DISEASE INVOLVING NATIVE CORONARY ARTERY OF NATIVE HEART WITHOUT ANGINA PECTORIS: ICD-10-CM

## 2021-10-30 DIAGNOSIS — I10 ESSENTIAL HYPERTENSION: ICD-10-CM

## 2021-11-02 ENCOUNTER — OFFICE VISIT (OUTPATIENT)
Dept: PAIN MANAGEMENT | Age: 66
End: 2021-11-02
Payer: MEDICARE

## 2021-11-02 VITALS
SYSTOLIC BLOOD PRESSURE: 172 MMHG | WEIGHT: 114.2 LBS | HEART RATE: 75 BPM | BODY MASS INDEX: 22.3 KG/M2 | DIASTOLIC BLOOD PRESSURE: 82 MMHG | OXYGEN SATURATION: 99 %

## 2021-11-02 DIAGNOSIS — M79.7 FIBROMYALGIA: ICD-10-CM

## 2021-11-02 DIAGNOSIS — G89.4 CHRONIC PAIN SYNDROME: ICD-10-CM

## 2021-11-02 DIAGNOSIS — E11.40 CHRONIC PAINFUL DIABETIC NEUROPATHY (HCC): ICD-10-CM

## 2021-11-02 DIAGNOSIS — M51.36 DDD (DEGENERATIVE DISC DISEASE), LUMBAR: ICD-10-CM

## 2021-11-02 DIAGNOSIS — M50.30 DDD (DEGENERATIVE DISC DISEASE), CERVICAL: ICD-10-CM

## 2021-11-02 DIAGNOSIS — G43.111 INTRACTABLE MIGRAINE WITH AURA WITH STATUS MIGRAINOSUS: ICD-10-CM

## 2021-11-02 DIAGNOSIS — M75.81 ROTATOR CUFF TENDONITIS, RIGHT: ICD-10-CM

## 2021-11-02 DIAGNOSIS — M75.81 TENDINITIS OF RIGHT ROTATOR CUFF: ICD-10-CM

## 2021-11-02 PROCEDURE — G8427 DOCREV CUR MEDS BY ELIG CLIN: HCPCS | Performed by: INTERNAL MEDICINE

## 2021-11-02 PROCEDURE — 3017F COLORECTAL CA SCREEN DOC REV: CPT | Performed by: INTERNAL MEDICINE

## 2021-11-02 PROCEDURE — 99213 OFFICE O/P EST LOW 20 MIN: CPT | Performed by: INTERNAL MEDICINE

## 2021-11-02 PROCEDURE — G8400 PT W/DXA NO RESULTS DOC: HCPCS | Performed by: INTERNAL MEDICINE

## 2021-11-02 PROCEDURE — 1123F ACP DISCUSS/DSCN MKR DOCD: CPT | Performed by: INTERNAL MEDICINE

## 2021-11-02 PROCEDURE — G8420 CALC BMI NORM PARAMETERS: HCPCS | Performed by: INTERNAL MEDICINE

## 2021-11-02 PROCEDURE — 1111F DSCHRG MED/CURRENT MED MERGE: CPT | Performed by: INTERNAL MEDICINE

## 2021-11-02 PROCEDURE — 1090F PRES/ABSN URINE INCON ASSESS: CPT | Performed by: INTERNAL MEDICINE

## 2021-11-02 PROCEDURE — 4040F PNEUMOC VAC/ADMIN/RCVD: CPT | Performed by: INTERNAL MEDICINE

## 2021-11-02 PROCEDURE — 3051F HG A1C>EQUAL 7.0%<8.0%: CPT | Performed by: INTERNAL MEDICINE

## 2021-11-02 PROCEDURE — 2022F DILAT RTA XM EVC RTNOPTHY: CPT | Performed by: INTERNAL MEDICINE

## 2021-11-02 PROCEDURE — G8482 FLU IMMUNIZE ORDER/ADMIN: HCPCS | Performed by: INTERNAL MEDICINE

## 2021-11-02 PROCEDURE — 1036F TOBACCO NON-USER: CPT | Performed by: INTERNAL MEDICINE

## 2021-11-02 RX ORDER — TIZANIDINE 4 MG/1
2-4 TABLET ORAL 2 TIMES DAILY PRN
Qty: 60 TABLET | Refills: 0 | Status: SHIPPED | OUTPATIENT
Start: 2021-11-02 | End: 2021-12-07 | Stop reason: SDUPTHER

## 2021-11-02 RX ORDER — UBROGEPANT 50 MG/1
TABLET ORAL
Qty: 10 TABLET | Refills: 0 | Status: SHIPPED | OUTPATIENT
Start: 2021-11-02 | End: 2021-12-07 | Stop reason: SDUPTHER

## 2021-11-02 RX ORDER — ISOSORBIDE MONONITRATE 30 MG/1
30 TABLET, EXTENDED RELEASE ORAL DAILY
Qty: 90 TABLET | Refills: 5 | Status: ON HOLD | OUTPATIENT
Start: 2021-11-02 | End: 2022-01-10 | Stop reason: ALTCHOICE

## 2021-11-02 RX ORDER — TORSEMIDE 10 MG/1
TABLET ORAL
Qty: 90 TABLET | Refills: 5 | Status: ON HOLD | OUTPATIENT
Start: 2021-11-02 | End: 2022-01-13 | Stop reason: HOSPADM

## 2021-11-02 RX ORDER — OXYCODONE AND ACETAMINOPHEN 7.5; 325 MG/1; MG/1
1 TABLET ORAL EVERY 6 HOURS PRN
Qty: 105 TABLET | Refills: 0 | Status: SHIPPED | OUTPATIENT
Start: 2021-11-02 | End: 2021-12-07 | Stop reason: SDUPTHER

## 2021-11-02 RX ORDER — CLOPIDOGREL BISULFATE 75 MG/1
TABLET ORAL
Qty: 90 TABLET | Refills: 5 | Status: SHIPPED | OUTPATIENT
Start: 2021-11-02 | End: 2022-08-02 | Stop reason: ALTCHOICE

## 2021-11-02 RX ORDER — QUETIAPINE FUMARATE 25 MG/1
25 TABLET, FILM COATED ORAL NIGHTLY
Qty: 60 TABLET | Refills: 1 | Status: SHIPPED | OUTPATIENT
Start: 2021-11-02 | End: 2021-12-07 | Stop reason: SDUPTHER

## 2021-11-02 RX ORDER — ATORVASTATIN CALCIUM 80 MG/1
80 TABLET, FILM COATED ORAL NIGHTLY
Qty: 90 TABLET | Refills: 5 | Status: SHIPPED | OUTPATIENT
Start: 2021-11-02

## 2021-11-02 RX ORDER — DULOXETIN HYDROCHLORIDE 60 MG/1
60 CAPSULE, DELAYED RELEASE ORAL DAILY
Qty: 30 CAPSULE | Refills: 1 | Status: SHIPPED | OUTPATIENT
Start: 2021-11-02 | End: 2021-12-07 | Stop reason: SDUPTHER

## 2021-11-02 NOTE — PROGRESS NOTES
Elie Dumas  1955  [id number]    HISTORY OF PRESENT ILLNESS:  Ms. Tatyana Fontaine is a 73 y.o. female returns for a follow up visit for multiple medical problems. Her current presenting problems are   1. Chronic pain syndrome    2. Tendinitis of right rotator cuff    3. Intractable migraine with aura with status migrainosus    4. Rotator cuff tendonitis, right    5. Chronic painful diabetic neuropathy ([facility] [address]    6. DDD (degenerative disc disease), lumbar    7. Moderate episode of recurrent major depressive disorder ([address]    8. Intractable migraine with aura without status migrainosus    9. DDD (degenerative disc disease), cervical    10. Fibromyalgia    . As per information/history obtained from the PADT(patient assessment and documentation tool) - She complains of pain in the head and lower back with radiation to the shoulders Bilateral, arms Bilateral, elbows Bilateral, hands Bilateral and knees Bilateral She rates the pain 9/10 and describes it as sharp. Pain is made worse by: movement, walking, standing, sitting, bending, lifting. Current treatment regimen has helped relieve about 10% of the pain. She denies side effects from the current pain regimen. Patient reports that since the last follow up visit the physical functioning is unchanged, family/social relationships are unchanged, mood is unchanged and sleep patterns are unchanged, and that the overall functioning is unchanged. Patient denies neurological bowel or bladder. Patient denies misusing/abusing her narcotic pain medications or using any illegal drugs. There are No indicators for possible drug abuse, addiction or diversion problems. Upon obtaining the medical history from Ms. Tatyana Fontaine regarding today's office visit for her presenting problems, patient states she was in the hospital for chest pains, heart and kidney issues. She says he is doing better, had some change in medications.  She mentions she had missed her appointment and has been out of medications. She reports she has been managing light house chores, has aide helping her. ALLERGIES: Patients list of allergies were reviewed     MEDICATIONS: Ms. Celina Jackson list of medications were reviewed. Her current medications are   Outpatient Medications Prior to Visit   Medication Sig Dispense Refill    atorvastatin (LIPITOR) 80 MG tablet TAKE 1 TABLET BY MOUTH NIGHTLY 90 tablet 5    torsemide (DEMADEX) 10 MG tablet TAKE 1 TABLET BY MOUTH DAILY 90 tablet 5    isosorbide mononitrate (IMDUR) 30 MG extended release tablet TAKE 1 TABLET BY MOUTH DAILY 90 tablet 5    clopidogrel (PLAVIX) 75 MG tablet TAKE ONE TABLET BY MOUTH EVERY DAY 90 tablet 5    ranolazine (RANEXA) 1000 MG extended release tablet Take 1 tablet by mouth 2 times daily 60 tablet 8    linaclotide (LINZESS) 145 MCG capsule Take 1 capsule by mouth every morning (before breakfast) 30 capsule 5    metoprolol succinate (TOPROL XL) 50 MG extended release tablet Take 50 mg by mouth nightly      Erenumab-aooe 140 MG/ML SOAJ Inject 140 mLs into the skin every 30 days 1 pen 1    insulin aspart (NOVOLOG FLEXPEN) 100 UNIT/ML injection pen Inject 3 Units into the skin 3 times daily (before meals) 5 pen 2    insulin glargine (BASAGLAR KWIKPEN) 100 UNIT/ML injection pen Inject 8 Units into the skin 2 times daily 5 pen 3    ondansetron (ZOFRAN) 4 MG tablet Take 1 tablet by mouth every 8 hours as needed for Nausea or Vomiting 21 tablet 1    ULTICARE MINI PEN NEEDLES 31G X 6 MM MISC USE WITH INSULINS FOUR TIMES A  each 0    UNIFINE PENTIPS 31G X 8 MM MISC USE WITH insulin pens 100 each 5    alirocumab (PRALUENT) 75 MG/ML SOAJ injection pen Inject 1 mL into the skin every 14 days 6 pen 3    omeprazole (PRILOSEC) 20 MG delayed release capsule TAKE 1 CAPSULE BY MOUTH DAILY 30 capsule 1    aspirin 81 MG chewable tablet Take 1 tablet by mouth daily 30 tablet 3    dicyclomine (BENTYL) 20 MG tablet Take 20 mg by mouth 4 times daily (before meals and nightly)       nitroGLYCERIN (NITROSTAT) 0.4 MG SL tablet Place 1 tablet under the tongue every 5 minutes as needed for Chest pain up to max of 3 total doses. If no relief after 1 dose, call 911. 25 tablet 3    Nutritional Supplements (ENSURE) LIQD Take 1 Can by mouth 3 times daily as needed (nutrition) 90 Can 5    amLODIPine (NORVASC) 5 MG tablet Take 1 tablet by mouth 2 times daily 180 tablet 5    blood glucose test strips (TRUE METRIX BLOOD GLUCOSE TEST) strip 1 each by Does not apply route 2 times daily 100 each 5    albuterol (PROVENTIL) (2.5 MG/3ML) 0.083% nebulizer solution Take 3 mLs by nebulization every 4 hours as needed for Wheezing 120 each 5    budesonide-formoterol (SYMBICORT) 160-4.5 MCG/ACT AERO Inhale 2 puffs into the lungs daily 2 Inhaler 5    albuterol sulfate HFA (VENTOLIN HFA) 108 (90 Base) MCG/ACT inhaler Inhale 2 puffs into the lungs every 4 hours as needed for Wheezing or Shortness of Breath 3 Inhaler 5    Ubrogepant (UBRELVY) 50 MG TABS TAKE 1 TABLET BY MOUTH AS NEEDED AT START OF HEADACHES, CAN REPEAT IN 24 HOURS 10 tablet 0    DULoxetine (CYMBALTA) 60 MG extended release capsule Take 1 capsule by mouth daily 30 capsule 1    QUEtiapine (SEROQUEL) 25 MG tablet Take 1 tablet by mouth nightly 60 tablet 1    tiZANidine (ZANAFLEX) 4 MG tablet Take 0.5-1 tablets by mouth 2 times daily as needed (muscle spasms) 60 tablet 0     No facility-administered medications prior to visit. REVIEW OF SYSTEMS:    Respiratory: Negative for apnea, chest tightness and shortness of breath or change in baseline breathing. PHYSICAL EXAM:   Nursing note and vitals reviewed. BP (!) 172/82   Pulse 75   Wt 114 lb 3.2 oz (51.8 kg)   SpO2 99%   BMI 22.30 kg/m²   Constitutional: She appears well-developed and well-nourished. No acute distress. Cardiovascular: Normal rate, regular rhythm, normal heart sounds, and does not have murmur.      Pulmonary/Chest: Effort normal. No respiratory distress. She does not have wheezes in the lung fields. She has no rales. Neurological/Psychiatric:She is alert and oriented to person, place, and time. Coordination is  normal.  Her mood isAppropriate and affect is Neutral/Euthymic(normal) . IMPRESSION:   1. Chronic pain syndrome    2. Tendinitis of right rotator cuff    3. Intractable migraine with aura with status migrainosus    4. Rotator cuff tendonitis, right    5. Chronic painful diabetic neuropathy ([address]    6. DDD (degenerative disc disease), lumbar    7. DDD (degenerative disc disease), cervical    8. Fibromyalgia        PLAN:  Informed verbal consent was obtained  -ROM/Stretching exercises as advised   -Interim history reviewed   -Restart Percocet 3 per day   -She was advised to increase fluids ( 5-7  glasses of fluid daily), limit caffeine, avoid cheese products, increase dietary fiber, increase activity and exercise as tolerated and relax regularly and enjoy meals   -Urine drug screen with GC/MS confirmation for opiates and drugs of abuse was reviewed and the results are negative for drugs of abuse and the prescribed medications were absent.  -Start has been complaint with them   -Will monitor closely  -Walking/Stretching exercises as advised   -Monitor blood sugar regularly, diabetic control- adv diabetic diet. Goal for fasting blood sugars around 120.  Follow up with Endocrinologist/PCP also for on going management    -Will call for a pill count in 2 weeks      Current Outpatient Medications   Medication Sig Dispense Refill    atorvastatin (LIPITOR) 80 MG tablet TAKE 1 TABLET BY MOUTH NIGHTLY 90 tablet 5    torsemide (DEMADEX) 10 MG tablet TAKE 1 TABLET BY MOUTH DAILY 90 tablet 5    isosorbide mononitrate (IMDUR) 30 MG extended release tablet TAKE 1 TABLET BY MOUTH DAILY 90 tablet 5    clopidogrel (PLAVIX) 75 MG tablet TAKE ONE TABLET BY MOUTH EVERY DAY 90 tablet 5    Ubrogepant (UBRELVY) 50 MG TABS TAKE 1 TABLET BY MOUTH AS NEEDED AT START OF HEADACHES, CAN REPEAT IN 24 HOURS 10 tablet 0    DULoxetine (CYMBALTA) 60 MG extended release capsule Take 1 capsule by mouth daily 30 capsule 1    QUEtiapine (SEROQUEL) 25 MG tablet Take 1 tablet by mouth nightly 60 tablet 1    tiZANidine (ZANAFLEX) 4 MG tablet Take 0.5-1 tablets by mouth 2 times daily as needed (muscle spasms) 60 tablet 0    oxyCODONE-acetaminophen (PERCOCET) 7.5-325 MG per tablet Take 1 tablet by mouth every 6 hours as needed for Pain for up to 35 days. 105 tablet 0    ranolazine (RANEXA) 1000 MG extended release tablet Take 1 tablet by mouth 2 times daily 60 tablet 8    linaclotide (LINZESS) 145 MCG capsule Take 1 capsule by mouth every morning (before breakfast) 30 capsule 5    metoprolol succinate (TOPROL XL) 50 MG extended release tablet Take 50 mg by mouth nightly      Erenumab-aooe 140 MG/ML SOAJ Inject 140 mLs into the skin every 30 days 1 pen 1    insulin aspart (NOVOLOG FLEXPEN) 100 UNIT/ML injection pen Inject 3 Units into the skin 3 times daily (before meals) 5 pen 2    insulin glargine (BASAGLAR KWIKPEN) 100 UNIT/ML injection pen Inject 8 Units into the skin 2 times daily 5 pen 3    ondansetron (ZOFRAN) 4 MG tablet Take 1 tablet by mouth every 8 hours as needed for Nausea or Vomiting 21 tablet 1    ULTICARE MINI PEN NEEDLES 31G X 6 MM MISC USE WITH INSULINS FOUR TIMES A  each 0    UNIFINE PENTIPS 31G X 8 MM MISC USE WITH insulin pens 100 each 5    alirocumab (PRALUENT) 75 MG/ML SOAJ injection pen Inject 1 mL into the skin every 14 days 6 pen 3    omeprazole (PRILOSEC) 20 MG delayed release capsule TAKE 1 CAPSULE BY MOUTH DAILY 30 capsule 1    aspirin 81 MG chewable tablet Take 1 tablet by mouth daily 30 tablet 3    dicyclomine (BENTYL) 20 MG tablet Take 20 mg by mouth 4 times daily (before meals and nightly)       nitroGLYCERIN (NITROSTAT) 0.4 MG SL tablet Place 1 tablet under the tongue every 5 minutes as needed for Chest pain up to max of 3 total doses. If no relief after 1 dose, call 911. 25 tablet 3    Nutritional Supplements (ENSURE) LIQD Take 1 Can by mouth 3 times daily as needed (nutrition) 90 Can 5    amLODIPine (NORVASC) 5 MG tablet Take 1 tablet by mouth 2 times daily 180 tablet 5    blood glucose test strips (TRUE METRIX BLOOD GLUCOSE TEST) strip 1 each by Does not apply route 2 times daily 100 each 5    albuterol (PROVENTIL) (2.5 MG/3ML) 0.083% nebulizer solution Take 3 mLs by nebulization every 4 hours as needed for Wheezing 120 each 5    budesonide-formoterol (SYMBICORT) 160-4.5 MCG/ACT AERO Inhale 2 puffs into the lungs daily 2 Inhaler 5    albuterol sulfate HFA (VENTOLIN HFA) 108 (90 Base) MCG/ACT inhaler Inhale 2 puffs into the lungs every 4 hours as needed for Wheezing or Shortness of Breath 3 Inhaler 5     No current facility-administered medications for this visit. I will continue her current medication regimen  which is part of the above treatment schedule. It has been helping with Ms. Meza's chronic  medical problems which for this visit include:   Diagnoses of Chronic pain syndrome, Tendinitis of right rotator cuff, Intractable migraine with aura with status migrainosus, Rotator cuff tendonitis, right, Chronic painful diabetic neuropathy ([address] DDD (degenerative disc disease), lumbar, Moderate episode of recurrent major depressive disorder ([address] Intractable migraine with aura without status migrainosus, DDD (degenerative disc disease), cervical, and Fibromyalgia were pertinent to this visit. Risks and benefits of the medications and other alternative treatments  including no treatment were discussed with the patient. The common side effects of these medications were also explained to the patient. Informed verbal consent was obtained.    Goals of current treatment regimen include improvement in pain, restoration of functioning- with focus on improvement in physical performance, general activity, work or disability,emotional distress, health care utilization and  decreased medication consumption. Will continue to monitor progress towards achieving/maintaining therapeutic goals with special emphasis on  1. Improvement in perceived interfernce  of pain with ADL's. Ability to do home exercises independently. Ability to do household chores indoor and/or outdoor work and social and leisure activities. Improve psychosocial and physical functioning. - she is showing progression towards this treatment goal with the current regimen. She was advised against drinking alcohol with the narcotic pain medicines, advised against driving or handling machinery while adjusting the dose of medicines or if having cognitive  issues related to the current medications. Risk of overdose and death, if medicines not taken as prescribed, were also discussed. If the patient develops new symptoms or if the symptoms worsen, the patient should call the office. While transcribing every attempt was made to maintain the accuracy of the note in terms of it's contents,there may have been some errors made inadvertently. Thank you for allowing me to participate in the care of this patient.     Dotty Childs MD.    Cc: Fadi Sepulveda MD

## 2021-11-18 ENCOUNTER — TELEPHONE (OUTPATIENT)
Dept: PAIN MANAGEMENT | Age: 66
End: 2021-11-18

## 2021-11-18 DIAGNOSIS — G89.4 CHRONIC PAIN SYNDROME: ICD-10-CM

## 2021-11-18 DIAGNOSIS — G43.111 INTRACTABLE MIGRAINE WITH AURA WITH STATUS MIGRAINOSUS: ICD-10-CM

## 2021-11-18 NOTE — TELEPHONE ENCOUNTER
Submitted PA for [employer] [facility] [address]: [id number]: \" Approved, Coverage Starts on: 8/19/2021 12:00:00 AM, Coverage Ends on: 11/18/2022 \"    The patient was notified by phone.

## 2021-11-26 ENCOUNTER — HOSPITAL ENCOUNTER (EMERGENCY)
Age: 66
Discharge: HOME | End: 2021-11-26
Payer: MEDICARE

## 2021-11-26 ENCOUNTER — APPOINTMENT (OUTPATIENT)
Dept: CT IMAGING | Age: 66
End: 2021-11-26
Payer: MEDICARE

## 2021-11-26 VITALS
DIASTOLIC BLOOD PRESSURE: 74 MMHG | TEMPERATURE: 98.8 F | SYSTOLIC BLOOD PRESSURE: 138 MMHG | WEIGHT: 110.01 LBS | HEART RATE: 90 BPM | HEIGHT: 60 IN | RESPIRATION RATE: 16 BRPM | BODY MASS INDEX: 21.6 KG/M2 | OXYGEN SATURATION: 98 %

## 2021-11-26 DIAGNOSIS — M50.30 DDD (DEGENERATIVE DISC DISEASE), CERVICAL: ICD-10-CM

## 2021-11-26 DIAGNOSIS — G44.209 TENSION HEADACHE: ICD-10-CM

## 2021-11-26 DIAGNOSIS — M54.2 NECK PAIN: Primary | ICD-10-CM

## 2021-11-26 LAB
BACTERIA: ABNORMAL /HPF
BILIRUBIN URINE: NEGATIVE
BLOOD, URINE: ABNORMAL
CLARITY: CLEAR
COLOR: YELLOW
EPITHELIAL CELLS, UA: 16 /HPF (ref 0–5)
GLUCOSE URINE: NEGATIVE MG/DL
HYALINE CASTS: 9 /LPF (ref 0–8)
KETONES, URINE: NEGATIVE MG/DL
LEUKOCYTE ESTERASE, URINE: NEGATIVE
MICROSCOPIC EXAMINATION: YES
NITRITE, URINE: NEGATIVE
PH UA: 5 (ref 5–8)
PROTEIN UA: 100 MG/DL
RBC UA: 3 /HPF (ref 0–4)
SPECIFIC GRAVITY UA: 1.02 (ref 1–1.03)
URINE REFLEX TO CULTURE: YES
URINE TYPE: ABNORMAL
UROBILINOGEN, URINE: 0.2 E.U./DL
WBC UA: 15 /HPF (ref 0–5)

## 2021-11-26 PROCEDURE — 99284 EMERGENCY DEPT VISIT MOD MDM: CPT

## 2021-11-26 PROCEDURE — 81001 URINALYSIS AUTO W/SCOPE: CPT

## 2021-11-26 PROCEDURE — 6370000000 HC RX 637 (ALT 250 FOR IP): Performed by: PHYSICIAN ASSISTANT

## 2021-11-26 PROCEDURE — 70450 CT HEAD/BRAIN W/O DYE: CPT

## 2021-11-26 PROCEDURE — 87086 URINE CULTURE/COLONY COUNT: CPT

## 2021-11-26 PROCEDURE — 6360000002 HC RX W HCPCS: Performed by: PHYSICIAN ASSISTANT

## 2021-11-26 PROCEDURE — 96372 THER/PROPH/DIAG INJ SC/IM: CPT

## 2021-11-26 PROCEDURE — 72125 CT NECK SPINE W/O DYE: CPT

## 2021-11-26 RX ORDER — ONDANSETRON 4 MG/1
4 TABLET, ORALLY DISINTEGRATING ORAL ONCE
Status: COMPLETED | OUTPATIENT
Start: 2021-11-26 | End: 2021-11-26

## 2021-11-26 RX ORDER — MORPHINE SULFATE 4 MG/ML
4 INJECTION, SOLUTION INTRAMUSCULAR; INTRAVENOUS ONCE
Status: COMPLETED | OUTPATIENT
Start: 2021-11-26 | End: 2021-11-26

## 2021-11-26 RX ORDER — LIDOCAINE 50 MG/G
1 PATCH TOPICAL DAILY
Qty: 10 PATCH | Refills: 0 | Status: SHIPPED | OUTPATIENT
Start: 2021-11-26 | End: 2021-12-06

## 2021-11-26 RX ORDER — DIAZEPAM 5 MG/1
2.5 TABLET ORAL ONCE
Status: COMPLETED | OUTPATIENT
Start: 2021-11-26 | End: 2021-11-26

## 2021-11-26 RX ADMIN — ONDANSETRON 4 MG: 4 TABLET, ORALLY DISINTEGRATING ORAL at 12:17

## 2021-11-26 RX ADMIN — MORPHINE SULFATE 4 MG: 4 INJECTION, SOLUTION INTRAMUSCULAR; INTRAVENOUS at 12:18

## 2021-11-26 RX ADMIN — DIAZEPAM 2.5 MG: 5 TABLET ORAL at 12:17

## 2021-11-26 ASSESSMENT — PAIN DESCRIPTION - PAIN TYPE
TYPE: ACUTE PAIN
TYPE: CHRONIC PAIN

## 2021-11-26 ASSESSMENT — PAIN DESCRIPTION - DESCRIPTORS: DESCRIPTORS: SHOOTING

## 2021-11-26 ASSESSMENT — PAIN DESCRIPTION - LOCATION
LOCATION: NECK;HEAD
LOCATION: NECK

## 2021-11-26 ASSESSMENT — PAIN SCALES - GENERAL
PAINLEVEL_OUTOF10: 10
PAINLEVEL_OUTOF10: 7

## 2021-11-26 NOTE — ED PROVIDER NOTES
[address] [facility]        Pt Name: Steph Gomes  MRN: [id number]  [place] 1955  Date of evaluation: 11/26/2021  Provider: Kristina Brannon PA-C  PCP: Richie Puente MD  Note Started: 10:58 AM EST      STEPHEN. I have evaluated this patient. My supervising physician was available for consultation. Triage CHIEF COMPLAINT       Chief Complaint   Patient presents with    Neck Pain     Pt c/o sudden onset neck pain that shoots into her head x 1 week. Denies injury, fevers or changes in vision.  Headache         HISTORY OF PRESENT ILLNESS   (Location/Symptom, Timing/Onset, Context/Setting, Quality, Duration, Modifying Factors, Severity)  Note limiting factors. Chief Complaint: Worsened right neck to head pain    Steph Gomes is a 73 y.o. female who presents admitting that she has chronic neck pain from disc disease and frequent spasm in the right side versus the left. It has been worse over the last week or so including up into the back of the head and the scalp is very tender according to the patient. However states it doesn't feel like it's in the skin but underneath the skin and the muscles. No fall or contusion or known overuse injury. Patient states that she doesn't do any massage or chiropractic to help symptoms but is on chronic daily narcotic pain medication and muscle relaxers. She did take a 4 mg Zanaflex this morning around 5 AM as well as her 7.5 mg Percocet tablet and it still not helping. Therefore she gave the family doctor call who recommended that she go to the ER. No measured fevers at home. Patient indicates that her pain is not worst of life or sudden onset but had gotten gradually worse and tight in that right posterior neck to the scalp area especially over the last week. No extremity acute loss of sensation or movement. No shortness of breath or chest pain vision change or generalized neck stiffness.  No runny nose cough or congestion. Patient states that her pain is 10 out of 10 right now worse with movement and better at rest.    Nursing Notes were all reviewed and agreed with or any disagreements were addressed in the HPI. REVIEW OF SYSTEMS    (2-9 systems for level 4, 10 or more for level 5)     Review of Systems  Positive for history as above including chronic neck pain from disc disease in as well as frequent neck muscle spasm and history of headaches. No acute fall contusion or twisting injury or worst of life headache or sudden onset or thunderclap but rather generalized neck to he scalp distribution especially in the right posterior portion where she frequently has spasms. No runny or stuffy nose fever chills cough or congestion shortness of breath or chest pain or abdominal acute changes patient states that because of her pain she hasn't really felt as much like eating over the last day or so. No extremity acute weakness or loss of sensation or movement.         PAST MEDICAL HISTORY     Past Medical History:   Diagnosis Date    Acid reflux     Anemia     Anxiety and depression     Arthritis     Asthma     Atrial fibrillation ([facility]     CAD (coronary artery disease) 12/3/2012    Cerebral artery occlusion with cerebral infarction [facility])     TIA\"\"S--right sided weakness & headache    CHF (congestive heart failure) (HCC)     Chronic kidney disease--stage III     40% kidney function    COPD (chronic obstructive pulmonary disease) ([address]     DM2 (diabetes mellitus, type 2) ([facility]     Dysarthria     Fibromyalgia 6/7/2016    Headache(784.0) 2/19/2013    Hemisensory loss     History of blood transfusion 11/2020    pt denies having transfusion reaction    Hyperlipidemia     Hypertension     IBS (irritable bowel syndrome)     Inferior vena cava occlusion (HCC)     Keratitis     MI, old     Neuropathy     Superior vena cava obstruction     Temporal arteritis ([address] 2/24/2014    Wears glasses SURGICAL HISTORY     Past Surgical History:   Procedure Laterality Date    ABLATION OF DYSRHYTHMIC FOCUS  1999  and 11/2020    times 2    ARTERY BIOPSY Right 04/23/2014    RIGHT TEMPORAL ARTERY BIOPSY    CAROTID ARTERY SURGERY Left     clean up per pt    CATARACT REMOVAL Bilateral     CHOLECYSTECTOMY      COLONOSCOPY N/A 4/9/2021    COLONOSCOPY WITH BIOPSY performed by Saurav Ramirez MD at [address] [facility] 10/15/2021    COLONOSCOPY performed by Saurav Ramirez MD at [address] [place] [address] Dr    HYSTERECTOMY      JOINT REPLACEMENT Right     KNEE ARTHROSCOPY Right     PTCA  10/2019    LAD and RCA inrtervention    TUNNELED VENOUS PORT PLACEMENT      left thigh.   SMART PORT-----Removed--total of 4 port placement and removal    UPPER GASTROINTESTINAL ENDOSCOPY N/A 7/6/2020    EGD DIAGNOSTIC ONLY performed by Fidel Munoz MD at [facility]       Previous Medications    ALBUTEROL (PROVENTIL) (2.5 MG/3ML) 0.083% NEBULIZER SOLUTION    Take 3 mLs by nebulization every 4 hours as needed for Wheezing    ALBUTEROL SULFATE HFA (VENTOLIN HFA) 108 (90 BASE) MCG/ACT INHALER    Inhale 2 puffs into the lungs every 4 hours as needed for Wheezing or Shortness of Breath    ALIROCUMAB (PRALUENT) 75 MG/ML SOAJ INJECTION PEN    Inject 1 mL into the skin every 14 days    AMLODIPINE (NORVASC) 5 MG TABLET    Take 1 tablet by mouth 2 times daily    ASPIRIN 81 MG CHEWABLE TABLET    Take 1 tablet by mouth daily    ATORVASTATIN (LIPITOR) 80 MG TABLET    TAKE 1 TABLET BY MOUTH NIGHTLY    BLOOD GLUCOSE TEST STRIPS (TRUE METRIX BLOOD GLUCOSE TEST) STRIP    1 each by Does not apply route 2 times daily    BUDESONIDE-FORMOTEROL (SYMBICORT) 160-4.5 MCG/ACT AERO    Inhale 2 puffs into the lungs daily    CLOPIDOGREL (PLAVIX) 75 MG TABLET    TAKE ONE TABLET BY MOUTH EVERY DAY    DICYCLOMINE (BENTYL) 20 MG TABLET    Take 20 mg by mouth 4 times daily (before meals and nightly)     DULOXETINE (CYMBALTA) 60 MG EXTENDED RELEASE CAPSULE    Take 1 capsule by mouth daily    ERENUMAB-AOOE 140 MG/ML SOAJ    Inject 140 mLs into the skin every 30 days    INSULIN ASPART (NOVOLOG FLEXPEN) 100 UNIT/ML INJECTION PEN    Inject 3 Units into the skin 3 times daily (before meals)    INSULIN GLARGINE (BASAGLAR KWIKPEN) 100 UNIT/ML INJECTION PEN    Inject 8 Units into the skin 2 times daily    ISOSORBIDE MONONITRATE (IMDUR) 30 MG EXTENDED RELEASE TABLET    TAKE 1 TABLET BY MOUTH DAILY    LINACLOTIDE (LINZESS) 145 MCG CAPSULE    Take 1 capsule by mouth every morning (before breakfast)    METOPROLOL SUCCINATE (TOPROL XL) 50 MG EXTENDED RELEASE TABLET    Take 50 mg by mouth nightly    NITROGLYCERIN (NITROSTAT) 0.4 MG SL TABLET    Place 1 tablet under the tongue every 5 minutes as needed for Chest pain up to max of 3 total doses. If no relief after 1 dose, call 911. NUTRITIONAL SUPPLEMENTS (ENSURE) LIQD    Take 1 Can by mouth 3 times daily as needed (nutrition)    OMEPRAZOLE (PRILOSEC) 20 MG DELAYED RELEASE CAPSULE    TAKE 1 CAPSULE BY MOUTH DAILY    ONDANSETRON (ZOFRAN) 4 MG TABLET    Take 1 tablet by mouth every 8 hours as needed for Nausea or Vomiting    OXYCODONE-ACETAMINOPHEN (PERCOCET) 7.5-325 MG PER TABLET    Take 1 tablet by mouth every 6 hours as needed for Pain for up to 35 days.     QUETIAPINE (SEROQUEL) 25 MG TABLET    Take 1 tablet by mouth nightly    RANOLAZINE (RANEXA) 1000 MG EXTENDED RELEASE TABLET    Take 1 tablet by mouth 2 times daily    TIZANIDINE (ZANAFLEX) 4 MG TABLET    Take 0.5-1 tablets by mouth 2 times daily as needed (muscle spasms)    TORSEMIDE (DEMADEX) 10 MG TABLET    TAKE 1 TABLET BY MOUTH DAILY    UBROGEPANT (UBRELVY) 50 MG TABS    TAKE 1 TABLET BY MOUTH AS NEEDED AT START OF HEADACHES, CAN REPEAT IN 24 HOURS    ULTICARE MINI PEN NEEDLES 31G X 6 MM MISC    USE WITH INSULINS FOUR TIMES A DAY    UNIFINE PENTIPS 31G X 8 MM MISC    USE WITH insulin pens ALLERGIES     Patient has no known allergies. FAMILYHISTORY       Family History   Problem Relation Age of Onset    Diabetes Mother     High Cholesterol Mother     Stroke Mother     Cancer Mother     High Blood Pressure Mother     No Known Problems Paternal Grandfather         lung issues         SOCIAL HISTORY       Social History     Socioeconomic History    Marital status:      Spouse name: None    Number of children: 6    Years of education: None    Highest education level: None   Occupational History    None   Tobacco Use    Smoking status: Former Smoker     Packs/day: 0.50     Years: 35.00     Pack years: 17.50     Types: Cigarettes     Quit date: 7/1/2018     Years since quitting: 3.4    Smokeless tobacco: Never Used    Tobacco comment: 5/13/15 has not smoked since hospitalization - kh   Vaping Use    Vaping Use: Never used   Substance and Sexual Activity    Alcohol use: No     Alcohol/week: 0.0 standard drinks    Drug use: Never    Sexual activity: Not Currently     Partners: Male   Other Topics Concern    None   Social History Narrative    None     Social Determinants of Health     Financial Resource Strain:     Difficulty of Paying Living Expenses: Not on file   Food Insecurity:     Worried About Running Out of Food in the Last Year: Not on file    Comfort of Food in the Last Year: Not on file   Transportation Needs:     Lack of Transportation (Medical): Not on file    Lack of Transportation (Non-Medical):  Not on file   Physical Activity:     Days of Exercise per Week: Not on file    Minutes of Exercise per Session: Not on file   Stress:     Feeling of Stress : Not on file   Social Connections:     Frequency of Communication with Friends and Family: Not on file    Frequency of Social Gatherings with Friends and Family: Not on file    Attends Hindu Services: Not on file    Active Member of Clubs or Organizations: Not on file    Attends Club or Organization Meetings: Not on file    Marital Status: Not on file   Intimate Partner Violence:     Fear of Current or Ex-Partner: Not on file    Emotionally Abused: Not on file    Physically Abused: Not on file    Sexually Abused: Not on file   Housing Stability:     Unable to Pay for Housing in the Last Year: Not on file    Number of [place] in the Last Year: Not on file    Unstable Housing in the Last Year: Not on file       SCREENINGS             PHYSICAL EXAM    (up to 7 for level 4, 8 or more for level 5)     ED Triage Vitals [11/26/21 1003]   BP Temp Temp Source Pulse Resp SpO2 Height Weight   (!) 145/72 99.8 °F (37.7 °C) Temporal 92 16 98 % 5' (1.524 m) 110 lb 0.2 oz (49.9 kg)       Physical Exam  Vitals and nursing note reviewed. Constitutional:       Appearance: Normal appearance. She is not diaphoretic. HENT:      Head: Normocephalic and atraumatic. Right Ear: External ear normal.      Left Ear: External ear normal.      Nose: Nose normal.      Mouth/Throat:      Mouth: Mucous membranes are moist.      Comments: Gag reflex intact  Eyes:      General:         Right eye: No discharge. Left eye: No discharge. Conjunctiva/sclera: Conjunctivae normal.   Neck:        Comments: Region of tenderness and tightness on exam as distributed above with no central midline spinous process tenderness or any nuchal rigidity. Patient does move her head from side to side back-and-forth as well as up and down at bedside however pushing on these areas are more tender. No mastoid tenderness. Cardiovascular:      Rate and Rhythm: Normal rate and regular rhythm. Pulses: Normal pulses. Heart sounds: Normal heart sounds. No murmur heard. No gallop. Pulmonary:      Effort: Pulmonary effort is normal. No respiratory distress. Breath sounds: Normal breath sounds. No wheezing, rhonchi or rales. Abdominal:      General: There is no distension. Palpations: Abdomen is soft. Tenderness: There is no abdominal tenderness. There is no right CVA tenderness or left CVA tenderness. Musculoskeletal:         General: No swelling, deformity or signs of injury. Normal range of motion. Cervical back: Normal range of motion and neck supple. No rigidity. Lymphadenopathy:      Cervical: No cervical adenopathy. Skin:     General: Skin is warm and dry. Capillary Refill: Capillary refill takes less than 2 seconds. Findings: No rash. Neurological:      Mental Status: She is alert and oriented to person, place, and time. Mental status is at baseline. Psychiatric:         Mood and Affect: Mood normal.         Behavior: Behavior normal.         DIAGNOSTIC RESULTS   LABS:    Labs Reviewed   URINE RT REFLEX TO CULTURE - Abnormal; Notable for the following components:       Result Value    Blood, Urine TRACE-INTACT (*)     Protein,  (*)     All other components within normal limits    Narrative:     Performed at:  [address] [address]   Phone [phone]   MICROSCOPIC URINALYSIS - Abnormal; Notable for the following components:    Bacteria, UA 1+ (*)     Hyaline Casts, UA 9 (*)     WBC, UA 15 (*)     Epithelial Cells, UA 16 (*)     All other components within normal limits    Narrative:     Performed at:  [address] [address]   Phone [phone]   CULTURE, URINE           EKG: When ordered, EKG's are interpreted by the Emergency Department Physician in the absence of a cardiologist.  Please see their note for interpretation of EKG.       RADIOLOGY:   Non-plain film images such as CT, Ultrasound and MRI are read by the radiologist. Plain radiographic images are visualized andpreliminarily interpreted by the  ED Provider with the below findings:        Interpretation pert Radiologist below, if available at the time of this note:    [facility] CONTRAST   Final Result   No acute intracranial abnormality. CT CERVICAL SPINE WO CONTRAST   Final Result   No acute osseous abnormality of the cervical spine. Spondylosis with degenerative changes most significant at C5-C6. CT HEAD WO CONTRAST    Result Date: 11/26/2021  EXAMINATION: CT OF THE HEAD WITHOUT CONTRAST  11/26/2021 10:15 am TECHNIQUE: CT of the head was performed without the administration of intravenous contrast. Dose modulation, iterative reconstruction, and/or weight based adjustment of the mA/kV was utilized to reduce the radiation dose to as low as reasonably achievable. COMPARISON: 05/25/2021 HISTORY: ORDERING SYSTEM PROVIDED HISTORY: Sudden onset neck and head pain TECHNOLOGIST PROVIDED HISTORY: Reason for exam:->Sudden onset neck and head pain Has a \"code stroke\" or \"stroke alert\" been called? ->No Decision Support Exception - unselect if not a suspected or confirmed emergency medical condition->Emergency Medical Condition (MA) Reason for Exam: Sudden onset neck and head pain Acuity: Acute Type of Exam: Initial FINDINGS: BRAIN/VENTRICLES: There is no acute intracranial hemorrhage, mass effect or midline shift. No abnormal extra-axial fluid collection. The gray-white differentiation is maintained without evidence of an acute infarct. There is prominence of the ventricles and sulci due to global parenchymal volume loss. There are nonspecific areas of hypoattenuation within the periventricular and subcortical white matter, which likely represent chronic microvascular ischemic change. ORBITS: The visualized portion of the orbits demonstrate no acute abnormality. SINUSES: The visualized paranasal sinuses and mastoid air cells demonstrate no acute abnormality. SOFT TISSUES/SKULL: No acute abnormality of the visualized skull or soft tissues. No acute intracranial abnormality.          PROCEDURES   Unless otherwise noted below, none     Procedures    CRITICAL CARE TIME N/A    CONSULTS:  None      EMERGENCY DEPARTMENT COURSE and DIFFERENTIAL DIAGNOSIS/MDM:   Vitals:    Vitals:    11/26/21 1003 11/26/21 1223   BP: (!) 145/72 138/74   Pulse: 92 90   Resp: 16 16   Temp: 99.8 °F (37.7 °C) 98.8 °F (37.1 °C)   TempSrc: Temporal Oral   SpO2: 98% 98%   Weight: 110 lb 0.2 oz (49.9 kg)    Height: 5' (1.524 m)        Patient was given thefollowing medications:  Medications   morphine (PF) injection 4 mg (4 mg IntraMUSCular Given 11/26/21 1218)   ondansetron (ZOFRAN-ODT) disintegrating tablet 4 mg (4 mg Oral Given 11/26/21 1217)   diazePAM (VALIUM) tablet 2.5 mg (2.5 mg Oral Given 11/26/21 1217)         Patient presents as above and evaluation and treatment is begun here. Some screening labs and CT head and CT C-spine were protocoled and are in process. Medications for comfort now ordered. Radiology findings from the CT exams return as above. Urinalysis shows more epithelials in the urine than white cells and patient without any urinary symptoms at this time. We will await culture to see whether or not any indication for antibiotic use. In the meantime patient's pain is significantly reduced with better range of motion and better comfort at this time. Patient is able to relax in bed scanning through her cell phone. No acute neurologic deficits or evidence of infectious or acute vascular insufficiency among other severe issues. The results of the CT images are discussed with the patient as well as the physical exam findings most consistent with muscle tenderness in the neck to scalp likely related to patient's degenerative disc disease of the neck. We will give on orthospine outpatient follow-up recommendation for the patient as well as medication to help out with discomfort and conservative home care instructions. She verbalizes understanding and agreement with the above and the following discharge home plan.     Home in good condition to consider using cool compress to the area as well as your home medications and the prescribed lidocaine patch. Do gentle stretches as medicine starts to help your discomfort, and monitor for gradual improvement. Call your orthospine doctor of choice after the weekend for further care and treatment as needed. Return to the emergency department for any emergency medical condition. FINAL IMPRESSION      1. Neck pain    2. DDD (degenerative disc disease), cervical    3. Tension headache          DISPOSITION/PLAN   DISPOSITION Decision To Discharge 11/26/2021 01:09:04 PM      PATIENT REFERREDTO:  [address] [facility]  [address] [address] [address]  Call in 2 days        DISCHARGE MEDICATIONS:  New Prescriptions    LIDOCAINE (LIDODERM) 5 %    Place 1 patch onto the skin daily for 10 days 12 hours on, 12 hours off.        DISCONTINUED MEDICATIONS:  Discontinued Medications    No medications on file              (Please note that portions ofthis note were completed with a voice recognition program.  Efforts were made to edit the dictations but occasionally words are mis-transcribed.)    Raul Tamayo PA-C (electronically signed)             Raul Tamayo PA-C  11/26/21 [id number]

## 2021-11-27 LAB — URINE CULTURE, ROUTINE: NORMAL

## 2021-11-29 ENCOUNTER — TELEPHONE (OUTPATIENT)
Dept: CARDIOLOGY CLINIC | Age: 66
End: 2021-11-29

## 2021-11-29 NOTE — TELEPHONE ENCOUNTER
CHEST PAIN    Location of pain Left arm, Chest, Left side of face  When did the pain start 30 minutes ago  Intermittent/Constant Constant  Does the pain radiate down left arm and left side of face  Shortness of br eath Just a little bit  Severity 1- 10  10  Nausea/Vomiting  Nausa , no vomiting  Sweating No   Diabetic{ Yes, Fasting today was 195 this morning  History of heart/lung disease  Yes, I had 2 heart attacks and stents. Spoke with patient she wants to fo  Emergency Room but wanted to check with us first.  Checked with Ana Mendez and Yanet Lawrence. OK to go to Emergency room.

## 2021-12-02 ENCOUNTER — TELEPHONE (OUTPATIENT)
Dept: PRIMARY CARE CLINIC | Age: 66
End: 2021-12-02

## 2021-12-07 ENCOUNTER — OFFICE VISIT (OUTPATIENT)
Dept: PAIN MANAGEMENT | Age: 66
End: 2021-12-07
Payer: MEDICAID

## 2021-12-07 VITALS
RESPIRATION RATE: 16 BRPM | BODY MASS INDEX: 20.2 KG/M2 | OXYGEN SATURATION: 99 % | SYSTOLIC BLOOD PRESSURE: 164 MMHG | WEIGHT: 107 LBS | DIASTOLIC BLOOD PRESSURE: 76 MMHG | HEIGHT: 61 IN | HEART RATE: 70 BPM

## 2021-12-07 DIAGNOSIS — M51.36 DDD (DEGENERATIVE DISC DISEASE), LUMBAR: ICD-10-CM

## 2021-12-07 DIAGNOSIS — M79.7 FIBROMYALGIA: ICD-10-CM

## 2021-12-07 DIAGNOSIS — G89.4 CHRONIC PAIN SYNDROME: ICD-10-CM

## 2021-12-07 DIAGNOSIS — E11.40 CHRONIC PAINFUL DIABETIC NEUROPATHY (HCC): ICD-10-CM

## 2021-12-07 DIAGNOSIS — M50.30 DDD (DEGENERATIVE DISC DISEASE), CERVICAL: ICD-10-CM

## 2021-12-07 DIAGNOSIS — M75.81 TENDINITIS OF RIGHT ROTATOR CUFF: ICD-10-CM

## 2021-12-07 PROCEDURE — 3051F HG A1C>EQUAL 7.0%<8.0%: CPT | Performed by: INTERNAL MEDICINE

## 2021-12-07 PROCEDURE — 99213 OFFICE O/P EST LOW 20 MIN: CPT | Performed by: INTERNAL MEDICINE

## 2021-12-07 RX ORDER — TIZANIDINE 4 MG/1
2-4 TABLET ORAL 2 TIMES DAILY PRN
Qty: 60 TABLET | Refills: 1 | Status: SHIPPED | OUTPATIENT
Start: 2021-12-07 | End: 2022-03-08 | Stop reason: SDUPTHER

## 2021-12-07 RX ORDER — OXYCODONE AND ACETAMINOPHEN 7.5; 325 MG/1; MG/1
1 TABLET ORAL EVERY 6 HOURS PRN
Qty: 105 TABLET | Refills: 0 | Status: ON HOLD | OUTPATIENT
Start: 2021-12-07 | End: 2022-01-13 | Stop reason: HOSPADM

## 2021-12-07 RX ORDER — ERENUMAB-AOOE 140 MG/ML
INJECTION, SOLUTION SUBCUTANEOUS
Qty: 1 ML | Refills: 1 | OUTPATIENT
Start: 2021-12-07

## 2021-12-07 RX ORDER — UBROGEPANT 50 MG/1
TABLET ORAL
Qty: 10 TABLET | Refills: 1 | Status: SHIPPED | OUTPATIENT
Start: 2021-12-07 | End: 2022-02-08 | Stop reason: SDUPTHER

## 2021-12-07 RX ORDER — QUETIAPINE FUMARATE 25 MG/1
25 TABLET, FILM COATED ORAL NIGHTLY
Qty: 60 TABLET | Refills: 1 | Status: SHIPPED | OUTPATIENT
Start: 2021-12-07 | End: 2022-02-08 | Stop reason: SDUPTHER

## 2021-12-07 RX ORDER — DULOXETIN HYDROCHLORIDE 60 MG/1
60 CAPSULE, DELAYED RELEASE ORAL DAILY
Qty: 30 CAPSULE | Refills: 1 | Status: SHIPPED | OUTPATIENT
Start: 2021-12-07 | End: 2022-02-08 | Stop reason: SDUPTHER

## 2021-12-07 NOTE — PROGRESS NOTES
Chata Maria  1955  [id number]    HISTORY OF PRESENT ILLNESS:  Ms. Lana Brothers is a 73 y.o. female returns for a follow up visit for multiple medical problems. Her current presenting problems are   1. Chronic pain syndrome    2. Intractable migraine with aura with status migrainosus    3. DDD (degenerative disc disease), lumbar    4. Fibromyalgia    5. DDD (degenerative disc disease), cervical    .    As per information/history obtained from the PADT(patient assessment and documentation tool) - She complains of pain in the neck with radiation to the lower back She rates the pain 9/10 and describes it as shooting. Pain is made worse by: movement. Current treatment regimen has helped relieve about 10% of the pain. She denies side effects from the current pain regimen. Patient reports that since the last follow up visit the physical functioning is worse, family/social relationships are worse, mood is worse and sleep patterns are worse, and that the overall functioning is worse. Patient denies neurological bowel or bladder. Patient denies misusing/abusing her narcotic pain medications or using any illegal drugs. There are No indicators for possible drug abuse, addiction or diversion problems. Upon obtaining the medical history from Ms. Lana Brothers regarding today's office visit for her presenting problems, patient states she has been in pain. Ms. Lana Brothers states she went to the emergency room for neck and head pain, she had some CT of the brain done along with was given some exercises, she maybe seeing Ortho. She reports her blood sugar has been up and down along with her blood pressure high. Patient is complaining of multiple other skeleton muscle complaints. Patient thinks she has been compliant with her regimen. ALLERGIES: Patients list of allergies were reviewed     MEDICATIONS: Ms. Lana Brothers list of medications were reviewed. Her current medications are   Outpatient Medications Prior to Visit   Medication skin every 14 days 6 pen 3    omeprazole (PRILOSEC) 20 MG delayed release capsule TAKE 1 CAPSULE BY MOUTH DAILY 30 capsule 1    aspirin 81 MG chewable tablet Take 1 tablet by mouth daily 30 tablet 3    dicyclomine (BENTYL) 20 MG tablet Take 20 mg by mouth 4 times daily (before meals and nightly)       nitroGLYCERIN (NITROSTAT) 0.4 MG SL tablet Place 1 tablet under the tongue every 5 minutes as needed for Chest pain up to max of 3 total doses. If no relief after 1 dose, call 911. 25 tablet 3    Nutritional Supplements (ENSURE) LIQD Take 1 Can by mouth 3 times daily as needed (nutrition) 90 Can 5    amLODIPine (NORVASC) 5 MG tablet Take 1 tablet by mouth 2 times daily 180 tablet 5    blood glucose test strips (TRUE METRIX BLOOD GLUCOSE TEST) strip 1 each by Does not apply route 2 times daily 100 each 5    albuterol (PROVENTIL) (2.5 MG/3ML) 0.083% nebulizer solution Take 3 mLs by nebulization every 4 hours as needed for Wheezing 120 each 5    budesonide-formoterol (SYMBICORT) 160-4.5 MCG/ACT AERO Inhale 2 puffs into the lungs daily 2 Inhaler 5    albuterol sulfate HFA (VENTOLIN HFA) 108 (90 Base) MCG/ACT inhaler Inhale 2 puffs into the lungs every 4 hours as needed for Wheezing or Shortness of Breath 3 Inhaler 5     No facility-administered medications prior to visit. REVIEW OF SYSTEMS:    Respiratory: Negative for apnea, chest tightness and shortness of breath or change in baseline breathing. PHYSICAL EXAM:   Nursing note and vitals reviewed. BP (!) 164/76   Pulse 70   Resp 16   Ht 5' 1\" (1.549 m)   Wt 107 lb (48.5 kg)   SpO2 99%   BMI 20.22 kg/m²   Constitutional: She appears well-developed and well-nourished. No acute distress. Cardiovascular: Normal rate, regular rhythm, normal heart sounds, and does not have murmur. Pulmonary/Chest: Effort normal. No respiratory distress. She does not have wheezes in the lung fields. She has no rales.      Neurological/Psychiatric:She is alert and oriented to person, place, and time. Coordination is  normal.  Her mood isAppropriate and affect is Neutral/Euthymic(normal) . IMPRESSION:   1. Chronic pain syndrome    2. DDD (degenerative disc disease), lumbar    3. Fibromyalgia    4. DDD (degenerative disc disease), cervical    5. Tendinitis of right rotator cuff    6. Chronic painful diabetic neuropathy (HCC)        PLAN:  Informed verbal consent was obtained  [place] was obtained and reviewed  for the last one year and no indicators of drug misuse  were found. Any other controlled substance prescriptions  seen on the record have been accounted for, I am aware of the patient receiving these medications. Juju Flood [place] record will be rechecked as part of office protocol. -ROM/Stretching exercises as advised   -She was advised to increase fluids ( 5-7  glasses of fluid daily), limit caffeine, avoid cheese products, increase dietary fiber, increase activity and exercise as tolerated and relax regularly and enjoy meals   -she was advised  to avoid using too many OTC analgesics to control the headaches, avoid chocolates, increased caffeine, cheeses and MSG nitrite containing foods, cigarette smoking.  To avoid bright lights, strong smells and skipping meals.   -Continue with Percocet 3 per day   -Will call in for pill count in 2 weeks     Current Outpatient Medications   Medication Sig Dispense Refill    atorvastatin (LIPITOR) 80 MG tablet TAKE 1 TABLET BY MOUTH NIGHTLY 90 tablet 5    torsemide (DEMADEX) 10 MG tablet TAKE 1 TABLET BY MOUTH DAILY 90 tablet 5    isosorbide mononitrate (IMDUR) 30 MG extended release tablet TAKE 1 TABLET BY MOUTH DAILY 90 tablet 5    clopidogrel (PLAVIX) 75 MG tablet TAKE ONE TABLET BY MOUTH EVERY DAY 90 tablet 5    Ubrogepant (UBRELVY) 50 MG TABS TAKE 1 TABLET BY MOUTH AS NEEDED AT START OF HEADACHES, CAN REPEAT IN 24 HOURS 10 tablet 0    DULoxetine (CYMBALTA) 60 MG extended release capsule Take 1 capsule by mouth daily 30 (nutrition) 90 Can 5    amLODIPine (NORVASC) 5 MG tablet Take 1 tablet by mouth 2 times daily 180 tablet 5    blood glucose test strips (TRUE METRIX BLOOD GLUCOSE TEST) strip 1 each by Does not apply route 2 times daily 100 each 5    albuterol (PROVENTIL) (2.5 MG/3ML) 0.083% nebulizer solution Take 3 mLs by nebulization every 4 hours as needed for Wheezing 120 each 5    budesonide-formoterol (SYMBICORT) 160-4.5 MCG/ACT AERO Inhale 2 puffs into the lungs daily 2 Inhaler 5    albuterol sulfate HFA (VENTOLIN HFA) 108 (90 Base) MCG/ACT inhaler Inhale 2 puffs into the lungs every 4 hours as needed for Wheezing or Shortness of Breath 3 Inhaler 5     No current facility-administered medications for this visit. I will continue her current medication regimen  which is part of the above treatment schedule. It has been helping with Ms. Meza's chronic  medical problems which for this visit include:   Diagnoses of Chronic pain syndrome, Intractable migraine with aura with status migrainosus, DDD (degenerative disc disease), lumbar, Fibromyalgia, and DDD (degenerative disc disease), cervical were pertinent to this visit. Risks and benefits of the medications and other alternative treatments  including no treatment were discussed with the patient. The common side effects of these medications were also explained to the patient. Informed verbal consent was obtained. Goals of current treatment regimen include improvement in pain, restoration of functioning- with focus on improvement in physical performance, general activity, work or disability,emotional distress, health care utilization and  decreased medication consumption. Will continue to monitor progress towards achieving/maintaining therapeutic goals with special emphasis on  1. Improvement in perceived interfernce  of pain with ADL's. Ability to do home exercises independently.  Ability to do household chores indoor and/or outdoor work and social and leisure activities. Improve psychosocial and physical functioning. - she is showing progression towards this treatment goal with the current regimen. She was advised against drinking alcohol with the narcotic pain medicines, advised against driving or handling machinery while adjusting the dose of medicines or if having cognitive  issues related to the current medications. Risk of overdose and death, if medicines not taken as prescribed, were also discussed. If the patient develops new symptoms or if the symptoms worsen, the patient should call the office. While transcribing every attempt was made to maintain the accuracy of the note in terms of it's contents,there may have been some errors made inadvertently. Thank you for allowing me to participate in the care of this patient.     Chloe Stover MD.    Cc: Bashir Valdez MD

## 2021-12-15 DIAGNOSIS — G89.4 CHRONIC PAIN SYNDROME: ICD-10-CM

## 2021-12-15 RX ORDER — ERENUMAB-AOOE 70 MG/ML
INJECTION SUBCUTANEOUS
Qty: 1 ML | Refills: 0 | Status: SHIPPED | OUTPATIENT
Start: 2021-12-15 | End: 2022-02-08 | Stop reason: SDUPTHER

## 2021-12-15 NOTE — TELEPHONE ENCOUNTER
Patient needs refill on  [employer] (AIMOVIG) 79 MG/ML [facility] [             Pharmacy    [id number], [address]

## 2021-12-20 ENCOUNTER — TELEPHONE (OUTPATIENT)
Dept: PAIN MANAGEMENT | Age: 66
End: 2021-12-20

## 2021-12-20 NOTE — TELEPHONE ENCOUNTER
Patient is in so much pain in back(disc) and headaches.  Has appt w/ortho but not till after the holidays      Please advise

## 2021-12-20 NOTE — TELEPHONE ENCOUNTER
Per [facility], patient can come into the [address] [place] [address] Drive office tomorrow any time between 9am - 4pm. He said to bring all of her medications that he prescribes for her, and they will discuss further what to do. I called patient to notify her, she voiced understanding.

## 2022-01-04 ENCOUNTER — OFFICE VISIT (OUTPATIENT)
Dept: ORTHOPEDIC SURGERY | Age: 67
End: 2022-01-04
Payer: COMMERCIAL

## 2022-01-04 VITALS — HEIGHT: 61 IN | WEIGHT: 107 LBS | BODY MASS INDEX: 20.2 KG/M2

## 2022-01-04 DIAGNOSIS — M47.812 CERVICAL SPONDYLOSIS: Primary | ICD-10-CM

## 2022-01-04 PROCEDURE — 99204 OFFICE O/P NEW MOD 45 MIN: CPT | Performed by: ORTHOPAEDIC SURGERY

## 2022-01-04 NOTE — PROGRESS NOTES
New Patient: LUMBAR SPINE    Referring Provider:  Referral, Self    CHIEF COMPLAINT:    Chief Complaint   Patient presents with    Neck Pain     cervical       HISTORY OF PRESENT ILLNESS:    Ms. Vera Duncan  is a pleasant 73 y.o. female who presents today for the evaluation of neck pain. She notes her symptoms began insidiously about 2 months ago. They have increased since that time. She rates the intensity of her neck pain 10/10. She notes intermittent numbness and tingling in her right hand and occasionally her right leg. She notes these last for about 5 minutes at a time. She denies loss of fine motor control.     Current/Past Treatment:   · Physical Therapy: No  · Chiropractic: No  · Injection: No  · Medications: None    Past Medical History:   Past Medical History:   Diagnosis Date    Acid reflux     Anemia     Anxiety and depression     Arthritis     Asthma     Atrial fibrillation (HCC)     CAD (coronary artery disease) 12/3/2012    Cerebral artery occlusion with cerebral infarction [facility])     TIA\"\"S--right sided weakness & headache    CH[facility]estive [facility] failure) [facility]     Chronic kidney disease--stage III     40% kidney function    COPD (chronic obstructive pulmonary disease) ([address]     DM2 (diabetes mellitus, type 2) ([address] [address]     Dysarthria     Fibromyalgia 6/7/2016    Headache(784.0) 2/19/2013    Hemisensory loss     History of blood transfusion 11/2020    pt denies having transfusion reaction    Hyperlipidemia     Hypertension     IBS (irritable bowel syndrome)     Inferior vena cava occlusion (HCC)     Keratitis     MI, old     Neuropathy     Superior vena cava obstruction     Temporal arteritis ([facility] [address] 2/24/2014    Wears glasses       Past Surgical History:     Past Surgical History:   Procedure Laterality Date    ABLATION OF DYSRHYTHMIC FOCUS  1999  and 11/2020    times 2    ARTERY BIOPSY Right 04/23/2014    RIGHT TEMPORAL ARTERY BIOPSY    CAROTID ARTERY SURGERY Left tablet, Take 1 tablet by mouth 2 times daily, Disp: 60 tablet, Rfl: 8    linaclotide (LINZESS) 145 MCG capsule, Take 1 capsule by mouth every morning (before breakfast), Disp: 30 capsule, Rfl: 5    metoprolol succinate (TOPROL XL) 50 MG extended release tablet, Take 50 mg by mouth nightly, Disp: , Rfl:     Erenumab-aooe 140 MG/ML SOAJ, Inject 140 mLs into the skin every 30 days, Disp: 1 pen, Rfl: 1    insulin aspart (NOVOLOG FLEXPEN) 100 UNIT/ML injection pen, Inject 3 Units into the skin 3 times daily (before meals), Disp: 5 pen, Rfl: 2    insulin glargine (BASAGLAR KWIKPEN) 100 UNIT/ML injection pen, Inject 8 Units into the skin 2 times daily, Disp: 5 pen, Rfl: 3    ondansetron (ZOFRAN) 4 MG tablet, Take 1 tablet by mouth every 8 hours as needed for Nausea or Vomiting, Disp: 21 tablet, Rfl: 1    ULTICARE MINI PEN NEEDLES 31G X 6 MM MISC, USE WITH INSULINS FOUR TIMES A DAY, Disp: 100 each, Rfl: 0    UNIFINE PENTIPS 31G X 8 MM MISC, USE WITH insulin pens, Disp: 100 each, Rfl: 5    alirocumab (PRALUENT) 75 MG/ML SOAJ injection pen, Inject 1 mL into the skin every 14 days, Disp: 6 pen, Rfl: 3    omeprazole (PRILOSEC) 20 MG delayed release capsule, TAKE 1 CAPSULE BY MOUTH DAILY, Disp: 30 capsule, Rfl: 1    aspirin 81 MG chewable tablet, Take 1 tablet by mouth daily, Disp: 30 tablet, Rfl: 3    dicyclomine (BENTYL) 20 MG tablet, Take 20 mg by mouth 4 times daily (before meals and nightly) , Disp: , Rfl:     nitroGLYCERIN (NITROSTAT) 0.4 MG SL tablet, Place 1 tablet under the tongue every 5 minutes as needed for Chest pain up to max of 3 total doses.  If no relief after 1 dose, call 911., Disp: 25 tablet, Rfl: 3    Nutritional Supplements (ENSURE) LIQD, Take 1 Can by mouth 3 times daily as needed (nutrition), Disp: 90 Can, Rfl: 5    amLODIPine (NORVASC) 5 MG tablet, Take 1 tablet by mouth 2 times daily, Disp: 180 tablet, Rfl: 5    blood glucose test strips (TRUE METRIX BLOOD GLUCOSE TEST) strip, 1 each by Does not apply route 2 times daily, Disp: 100 each, Rfl: 5    albuterol (PROVENTIL) (2.5 MG/3ML) 0.083% nebulizer solution, Take 3 mLs by nebulization every 4 hours as needed for Wheezing, Disp: 120 each, Rfl: 5    budesonide-formoterol (SYMBICORT) 160-4.5 MCG/ACT AERO, Inhale 2 puffs into the lungs daily, Disp: 2 Inhaler, Rfl: 5    albuterol sulfate HFA (VENTOLIN HFA) 108 (90 Base) MCG/ACT inhaler, Inhale 2 puffs into the lungs every 4 hours as needed for Wheezing or Shortness of Breath, Disp: 3 Inhaler, Rfl: 5  Allergies:  Patient has no known allergies. Social History:    reports that she quit smoking about 3 years ago. Her smoking use included cigarettes. She has a 17.50 pack-year smoking history. She has never used smokeless tobacco. She reports that she does not drink alcohol and does not use drugs. Family History:   Family History   Problem Relation Age of Onset    Diabetes Mother     High Cholesterol Mother     Stroke Mother     Cancer Mother     High Blood Pressure Mother     No Known Problems Paternal Grandfather         lung issues        REVIEW OF SYSTEMS: Full ROS noted & scanned   CONSTITUTIONAL: Denies unexplained weight loss, fevers, chills or fatigue  NEUROLOGICAL: Denies unsteady gait or progressive weakness  MUSCULOSKELETAL: Denies joint swelling or redness  PSYCHOLOGICAL: Denies anxiety, depression   SKIN: Denies skin changes, delayed healing, rash, itching   HEMATOLOGIC: Denies easy bleeding or bruising  ENDOCRINE: Denies excessive thirst, urination, heat/cold  RESPIRATORY: Denies current dyspnea, cough  GI: Denies nausea, vomiting, diarrhea   : Denies bowel or bladder issues      PHYSICAL EXAM:    Vitals: Height 5' 0.98\" (1.549 m), weight 107 lb (48.5 kg), not currently breastfeeding. GENERAL EXAM:  · General Apparence: Patient is adequately groomed with no evidence of malnutrition. · Orientation: The patient is oriented to time, place and person. · Mood & Affect: The patient's mood and affect are appropriate. · Vascular: Examination reveals no swelling tenderness in upper or lower extremities. Good capillary refill. · Lymphatic: The lymphatic examination bilaterally reveals all areas to be without enlargement or induration  · Sensation: Sensation is intact without deficit  · Coordination/Balance: Good coordination     LUMBAR/SACRAL EXAMINATION:  · Inspection: Local inspection shows no step-off or bruising. Lumbar alignment is normal.  Sagittal and Coronal balance is neutral.      · Palpation:   No evidence of tenderness at the midline. No tenderness bilaterally at the paraspinal or trochanters. There is no step-off or paraspinal spasm. · Range of Motion: Lumbar flexion, extension and rotation are mildly limited due to pain. · Strength:   Strength testing is 5/5 in all muscle groups tested. · Special Tests:   Straight leg raise and crossed SLR negative. Leg length and pelvis level. · Skin: There are no rashes, ulcerations or lesions. · Reflexes: Reflexes are symmetrically 2+ at the patellar and ankle tendons. Clonus absent bilaterally at the feet. · Gait & station: normal, patient ambulates without assistance    · Additional Examinations:   · RIGHT LOWER EXTREMITY: Inspection/examination of the right lower extremity does not show any tenderness, deformity or injury. Range of motion is unremarkable. There is no gross instability. There are no rashes, ulcerations or lesions. Strength and tone are normal.    · LEFT LOWER EXTREMITY:  Inspection/examination of the left lower extremity does not show any tenderness, deformity or injury. Range of motion is unremarkable. There is no gross instability. There are no rashes, ulcerations or lesions. Strength and tone are normal.    Diagnostic Testing:    I reviewed CT images of her cervical spine from 11/26/2021 in the office today. Those show spondylosis worse C5-C6.     Impression:   Cervical spondylosis without radiculopathy    Plan:    Discussed treatment options including observation, physical therapy, epidural injection, and additional imaging. She would like to proceed with physical therapy.   She will call to schedule a cervical MRI if her symptoms persist after that

## 2022-01-06 ENCOUNTER — TELEPHONE (OUTPATIENT)
Dept: ORTHOPEDIC SURGERY | Age: 67
End: 2022-01-06

## 2022-01-07 DIAGNOSIS — M47.812 CERVICAL SPONDYLOSIS: Primary | ICD-10-CM

## 2022-01-09 ENCOUNTER — HOSPITAL ENCOUNTER (INPATIENT)
Age: 67
LOS: 4 days | Discharge: HOME | DRG: 313 | End: 2022-01-13
Attending: STUDENT IN AN ORGANIZED HEALTH CARE EDUCATION/TRAINING PROGRAM | Admitting: INTERNAL MEDICINE
Payer: COMMERCIAL

## 2022-01-09 ENCOUNTER — APPOINTMENT (OUTPATIENT)
Dept: GENERAL RADIOLOGY | Age: 67
DRG: 313 | End: 2022-01-09
Payer: COMMERCIAL

## 2022-01-09 DIAGNOSIS — Z86.79 HISTORY OF CORONARY ARTERY DISEASE: ICD-10-CM

## 2022-01-09 DIAGNOSIS — R07.9 CHEST PAIN, UNSPECIFIED TYPE: Primary | ICD-10-CM

## 2022-01-09 LAB
A/G RATIO: 0.9 (ref 1.1–2.2)
ALBUMIN SERPL-MCNC: 3.9 G/DL (ref 3.4–5)
ALP BLD-CCNC: 120 U/L (ref 40–129)
ALT SERPL-CCNC: 13 U/L (ref 10–40)
ANION GAP SERPL CALCULATED.3IONS-SCNC: 14 MMOL/L (ref 3–16)
AST SERPL-CCNC: 23 U/L (ref 15–37)
BASOPHILS ABSOLUTE: 0 K/UL (ref 0–0.2)
BASOPHILS RELATIVE PERCENT: 0.6 %
BILIRUB SERPL-MCNC: 0.4 MG/DL (ref 0–1)
BUN BLDV-MCNC: 22 MG/DL (ref 7–20)
CALCIUM SERPL-MCNC: 9.7 MG/DL (ref 8.3–10.6)
CHLORIDE BLD-SCNC: 104 MMOL/L (ref 99–110)
CO2: 20 MMOL/L (ref 21–32)
CREAT SERPL-MCNC: 1.2 MG/DL (ref 0.6–1.2)
EOSINOPHILS ABSOLUTE: 0.1 K/UL (ref 0–0.6)
EOSINOPHILS RELATIVE PERCENT: 1.6 %
GFR AFRICAN AMERICAN: 55
GFR NON-AFRICAN AMERICAN: 45
GLUCOSE BLD-MCNC: 138 MG/DL (ref 70–99)
GLUCOSE BLD-MCNC: 231 MG/DL (ref 70–99)
HCT VFR BLD CALC: 35.6 % (ref 36–48)
HEMOGLOBIN: 11.9 G/DL (ref 12–16)
LYMPHOCYTES ABSOLUTE: 1.2 K/UL (ref 1–5.1)
LYMPHOCYTES RELATIVE PERCENT: 23.7 %
MCH RBC QN AUTO: 31.7 PG (ref 26–34)
MCHC RBC AUTO-ENTMCNC: 33.4 G/DL (ref 31–36)
MCV RBC AUTO: 94.8 FL (ref 80–100)
MONOCYTES ABSOLUTE: 0.2 K/UL (ref 0–1.3)
MONOCYTES RELATIVE PERCENT: 3.3 %
NEUTROPHILS ABSOLUTE: 3.5 K/UL (ref 1.7–7.7)
NEUTROPHILS RELATIVE PERCENT: 70.8 %
PDW BLD-RTO: 14.8 % (ref 12.4–15.4)
PERFORMED ON: ABNORMAL
PLATELET # BLD: 221 K/UL (ref 135–450)
PMV BLD AUTO: 8.6 FL (ref 5–10.5)
POTASSIUM REFLEX MAGNESIUM: 4.2 MMOL/L (ref 3.5–5.1)
RBC # BLD: 3.75 M/UL (ref 4–5.2)
SODIUM BLD-SCNC: 138 MMOL/L (ref 136–145)
TOTAL PROTEIN: 8.1 G/DL (ref 6.4–8.2)
TROPONIN: <0.01 NG/ML
TROPONIN: <0.01 NG/ML
WBC # BLD: 4.9 K/UL (ref 4–11)

## 2022-01-09 PROCEDURE — 80053 COMPREHEN METABOLIC PANEL: CPT

## 2022-01-09 PROCEDURE — 83036 HEMOGLOBIN GLYCOSYLATED A1C: CPT

## 2022-01-09 PROCEDURE — 2580000003 HC RX 258: Performed by: INTERNAL MEDICINE

## 2022-01-09 PROCEDURE — 1200000000 HC SEMI PRIVATE

## 2022-01-09 PROCEDURE — 6370000000 HC RX 637 (ALT 250 FOR IP): Performed by: NURSE PRACTITIONER

## 2022-01-09 PROCEDURE — 85025 COMPLETE CBC W/AUTO DIFF WBC: CPT

## 2022-01-09 PROCEDURE — 6360000002 HC RX W HCPCS: Performed by: NURSE PRACTITIONER

## 2022-01-09 PROCEDURE — 96375 TX/PRO/DX INJ NEW DRUG ADDON: CPT

## 2022-01-09 PROCEDURE — 93005 ELECTROCARDIOGRAM TRACING: CPT | Performed by: NURSE PRACTITIONER

## 2022-01-09 PROCEDURE — 71046 X-RAY EXAM CHEST 2 VIEWS: CPT

## 2022-01-09 PROCEDURE — 36415 COLL VENOUS BLD VENIPUNCTURE: CPT

## 2022-01-09 PROCEDURE — 6370000000 HC RX 637 (ALT 250 FOR IP): Performed by: INTERNAL MEDICINE

## 2022-01-09 PROCEDURE — 96374 THER/PROPH/DIAG INJ IV PUSH: CPT

## 2022-01-09 PROCEDURE — 99285 EMERGENCY DEPT VISIT HI MDM: CPT

## 2022-01-09 PROCEDURE — 96376 TX/PRO/DX INJ SAME DRUG ADON: CPT

## 2022-01-09 PROCEDURE — 93005 ELECTROCARDIOGRAM TRACING: CPT | Performed by: INTERNAL MEDICINE

## 2022-01-09 PROCEDURE — 84484 ASSAY OF TROPONIN QUANT: CPT

## 2022-01-09 RX ORDER — INSULIN GLARGINE 100 [IU]/ML
8 INJECTION, SOLUTION SUBCUTANEOUS 2 TIMES DAILY
Status: DISCONTINUED | OUTPATIENT
Start: 2022-01-09 | End: 2022-01-13 | Stop reason: HOSPADM

## 2022-01-09 RX ORDER — DEXTROSE MONOHYDRATE 50 MG/ML
100 INJECTION, SOLUTION INTRAVENOUS PRN
Status: DISCONTINUED | OUTPATIENT
Start: 2022-01-09 | End: 2022-01-13 | Stop reason: HOSPADM

## 2022-01-09 RX ORDER — SODIUM CHLORIDE 0.9 % (FLUSH) 0.9 %
10 SYRINGE (ML) INJECTION EVERY 12 HOURS SCHEDULED
Status: DISCONTINUED | OUTPATIENT
Start: 2022-01-09 | End: 2022-01-13 | Stop reason: HOSPADM

## 2022-01-09 RX ORDER — PROMETHAZINE HYDROCHLORIDE 25 MG/1
12.5 TABLET ORAL EVERY 6 HOURS PRN
Status: DISCONTINUED | OUTPATIENT
Start: 2022-01-09 | End: 2022-01-13 | Stop reason: HOSPADM

## 2022-01-09 RX ORDER — MAGNESIUM SULFATE IN WATER 40 MG/ML
2000 INJECTION, SOLUTION INTRAVENOUS PRN
Status: DISCONTINUED | OUTPATIENT
Start: 2022-01-09 | End: 2022-01-10

## 2022-01-09 RX ORDER — METOPROLOL SUCCINATE 50 MG/1
50 TABLET, EXTENDED RELEASE ORAL NIGHTLY
Status: DISCONTINUED | OUTPATIENT
Start: 2022-01-09 | End: 2022-01-11

## 2022-01-09 RX ORDER — NICOTINE POLACRILEX 4 MG
15 LOZENGE BUCCAL PRN
Status: DISCONTINUED | OUTPATIENT
Start: 2022-01-09 | End: 2022-01-13 | Stop reason: HOSPADM

## 2022-01-09 RX ORDER — ALBUTEROL SULFATE 2.5 MG/3ML
2.5 SOLUTION RESPIRATORY (INHALATION) EVERY 4 HOURS PRN
Status: DISCONTINUED | OUTPATIENT
Start: 2022-01-09 | End: 2022-01-13 | Stop reason: HOSPADM

## 2022-01-09 RX ORDER — OXYCODONE AND ACETAMINOPHEN 7.5; 325 MG/1; MG/1
1 TABLET ORAL EVERY 6 HOURS PRN
Status: DISCONTINUED | OUTPATIENT
Start: 2022-01-09 | End: 2022-01-13

## 2022-01-09 RX ORDER — ASPIRIN 81 MG/1
81 TABLET, CHEWABLE ORAL DAILY
Status: DISCONTINUED | OUTPATIENT
Start: 2022-01-10 | End: 2022-01-13 | Stop reason: HOSPADM

## 2022-01-09 RX ORDER — POTASSIUM CHLORIDE 7.45 MG/ML
10 INJECTION INTRAVENOUS PRN
Status: DISCONTINUED | OUTPATIENT
Start: 2022-01-09 | End: 2022-01-10

## 2022-01-09 RX ORDER — SODIUM CHLORIDE 9 MG/ML
25 INJECTION, SOLUTION INTRAVENOUS PRN
Status: DISCONTINUED | OUTPATIENT
Start: 2022-01-09 | End: 2022-01-13 | Stop reason: HOSPADM

## 2022-01-09 RX ORDER — RANOLAZINE 500 MG/1
1000 TABLET, EXTENDED RELEASE ORAL 2 TIMES DAILY
Status: DISCONTINUED | OUTPATIENT
Start: 2022-01-09 | End: 2022-01-13 | Stop reason: HOSPADM

## 2022-01-09 RX ORDER — QUETIAPINE FUMARATE 25 MG/1
25 TABLET, FILM COATED ORAL NIGHTLY
Status: DISCONTINUED | OUTPATIENT
Start: 2022-01-09 | End: 2022-01-13 | Stop reason: HOSPADM

## 2022-01-09 RX ORDER — ASPIRIN 81 MG/1
324 TABLET, CHEWABLE ORAL ONCE
Status: COMPLETED | OUTPATIENT
Start: 2022-01-09 | End: 2022-01-09

## 2022-01-09 RX ORDER — AMLODIPINE BESYLATE 5 MG/1
5 TABLET ORAL 2 TIMES DAILY
Status: DISCONTINUED | OUTPATIENT
Start: 2022-01-09 | End: 2022-01-11

## 2022-01-09 RX ORDER — ACETAMINOPHEN 325 MG/1
650 TABLET ORAL EVERY 6 HOURS PRN
Status: DISCONTINUED | OUTPATIENT
Start: 2022-01-09 | End: 2022-01-13 | Stop reason: HOSPADM

## 2022-01-09 RX ORDER — POTASSIUM CHLORIDE 20 MEQ/1
40 TABLET, EXTENDED RELEASE ORAL PRN
Status: DISCONTINUED | OUTPATIENT
Start: 2022-01-09 | End: 2022-01-10

## 2022-01-09 RX ORDER — DICYCLOMINE HCL 20 MG
20 TABLET ORAL
Status: DISCONTINUED | OUTPATIENT
Start: 2022-01-09 | End: 2022-01-13 | Stop reason: HOSPADM

## 2022-01-09 RX ORDER — DEXTROSE MONOHYDRATE 25 G/50ML
12.5 INJECTION, SOLUTION INTRAVENOUS PRN
Status: DISCONTINUED | OUTPATIENT
Start: 2022-01-09 | End: 2022-01-13 | Stop reason: HOSPADM

## 2022-01-09 RX ORDER — ATORVASTATIN CALCIUM 80 MG/1
80 TABLET, FILM COATED ORAL NIGHTLY
Status: DISCONTINUED | OUTPATIENT
Start: 2022-01-09 | End: 2022-01-13 | Stop reason: HOSPADM

## 2022-01-09 RX ORDER — MORPHINE SULFATE 2 MG/ML
4 INJECTION, SOLUTION INTRAMUSCULAR; INTRAVENOUS ONCE
Status: COMPLETED | OUTPATIENT
Start: 2022-01-09 | End: 2022-01-09

## 2022-01-09 RX ORDER — TORSEMIDE 10 MG/1
10 TABLET ORAL DAILY
Status: DISCONTINUED | OUTPATIENT
Start: 2022-01-10 | End: 2022-01-13 | Stop reason: HOSPADM

## 2022-01-09 RX ORDER — ONDANSETRON 2 MG/ML
4 INJECTION INTRAMUSCULAR; INTRAVENOUS ONCE
Status: COMPLETED | OUTPATIENT
Start: 2022-01-09 | End: 2022-01-09

## 2022-01-09 RX ORDER — TIZANIDINE 4 MG/1
2 TABLET ORAL 2 TIMES DAILY PRN
Status: DISCONTINUED | OUTPATIENT
Start: 2022-01-09 | End: 2022-01-13 | Stop reason: HOSPADM

## 2022-01-09 RX ORDER — PANTOPRAZOLE SODIUM 40 MG/1
40 TABLET, DELAYED RELEASE ORAL
Status: DISCONTINUED | OUTPATIENT
Start: 2022-01-10 | End: 2022-01-13 | Stop reason: HOSPADM

## 2022-01-09 RX ORDER — SODIUM CHLORIDE 0.9 % (FLUSH) 0.9 %
10 SYRINGE (ML) INJECTION PRN
Status: DISCONTINUED | OUTPATIENT
Start: 2022-01-09 | End: 2022-01-13 | Stop reason: HOSPADM

## 2022-01-09 RX ORDER — MORPHINE SULFATE 2 MG/ML
1 INJECTION, SOLUTION INTRAMUSCULAR; INTRAVENOUS EVERY 4 HOURS PRN
Status: DISCONTINUED | OUTPATIENT
Start: 2022-01-09 | End: 2022-01-13

## 2022-01-09 RX ORDER — ONDANSETRON 2 MG/ML
4 INJECTION INTRAMUSCULAR; INTRAVENOUS EVERY 6 HOURS PRN
Status: DISCONTINUED | OUTPATIENT
Start: 2022-01-09 | End: 2022-01-13 | Stop reason: HOSPADM

## 2022-01-09 RX ORDER — ISOSORBIDE MONONITRATE 30 MG/1
30 TABLET, EXTENDED RELEASE ORAL DAILY
Status: DISCONTINUED | OUTPATIENT
Start: 2022-01-10 | End: 2022-01-13 | Stop reason: HOSPADM

## 2022-01-09 RX ORDER — CLOPIDOGREL BISULFATE 75 MG/1
75 TABLET ORAL DAILY
Status: DISCONTINUED | OUTPATIENT
Start: 2022-01-10 | End: 2022-01-13 | Stop reason: HOSPADM

## 2022-01-09 RX ORDER — ACETAMINOPHEN 650 MG/1
650 SUPPOSITORY RECTAL EVERY 6 HOURS PRN
Status: DISCONTINUED | OUTPATIENT
Start: 2022-01-09 | End: 2022-01-13 | Stop reason: HOSPADM

## 2022-01-09 RX ORDER — DULOXETIN HYDROCHLORIDE 60 MG/1
60 CAPSULE, DELAYED RELEASE ORAL DAILY
Status: DISCONTINUED | OUTPATIENT
Start: 2022-01-10 | End: 2022-01-13 | Stop reason: HOSPADM

## 2022-01-09 RX ADMIN — TIZANIDINE 2 MG: 4 TABLET ORAL at 22:18

## 2022-01-09 RX ADMIN — METOPROLOL SUCCINATE 50 MG: 50 TABLET, EXTENDED RELEASE ORAL at 22:19

## 2022-01-09 RX ADMIN — AMLODIPINE BESYLATE 5 MG: 5 TABLET ORAL at 22:18

## 2022-01-09 RX ADMIN — QUETIAPINE FUMARATE 25 MG: 25 TABLET ORAL at 22:19

## 2022-01-09 RX ADMIN — INSULIN GLARGINE 8 UNITS: 100 INJECTION, SOLUTION SUBCUTANEOUS at 22:21

## 2022-01-09 RX ADMIN — ACETAMINOPHEN 650 MG: 325 TABLET ORAL at 19:50

## 2022-01-09 RX ADMIN — ONDANSETRON 4 MG: 2 INJECTION INTRAMUSCULAR; INTRAVENOUS at 12:29

## 2022-01-09 RX ADMIN — ATORVASTATIN CALCIUM 80 MG: 80 TABLET, FILM COATED ORAL at 22:19

## 2022-01-09 RX ADMIN — DICYCLOMINE HYDROCHLORIDE 20 MG: 20 TABLET ORAL at 22:18

## 2022-01-09 RX ADMIN — MORPHINE SULFATE 4 MG: 2 INJECTION, SOLUTION INTRAMUSCULAR; INTRAVENOUS at 14:34

## 2022-01-09 RX ADMIN — OXYCODONE AND ACETAMINOPHEN 1 TABLET: 7.5; 325 TABLET ORAL at 22:18

## 2022-01-09 RX ADMIN — RANOLAZINE 1000 MG: 500 TABLET, FILM COATED, EXTENDED RELEASE ORAL at 22:17

## 2022-01-09 RX ADMIN — Medication 10 ML: at 19:51

## 2022-01-09 RX ADMIN — MORPHINE SULFATE 4 MG: 2 INJECTION, SOLUTION INTRAMUSCULAR; INTRAVENOUS at 12:29

## 2022-01-09 RX ADMIN — ASPIRIN 81 MG 324 MG: 81 TABLET ORAL at 12:32

## 2022-01-09 ASSESSMENT — PAIN SCALES - GENERAL
PAINLEVEL_OUTOF10: 10
PAINLEVEL_OUTOF10: 8
PAINLEVEL_OUTOF10: 10
PAINLEVEL_OUTOF10: 9
PAINLEVEL_OUTOF10: 6
PAINLEVEL_OUTOF10: 6
PAINLEVEL_OUTOF10: 7

## 2022-01-09 ASSESSMENT — PAIN DESCRIPTION - PAIN TYPE
TYPE: ACUTE PAIN

## 2022-01-09 ASSESSMENT — HEART SCORE: ECG: 0

## 2022-01-09 ASSESSMENT — PAIN DESCRIPTION - FREQUENCY
FREQUENCY: CONTINUOUS

## 2022-01-09 ASSESSMENT — PAIN DESCRIPTION - PROGRESSION: CLINICAL_PROGRESSION: NOT CHANGED

## 2022-01-09 ASSESSMENT — ENCOUNTER SYMPTOMS
DIARRHEA: 0
COLOR CHANGE: 0
ABDOMINAL PAIN: 0
WHEEZING: 0
VOMITING: 0
BACK PAIN: 0
COUGH: 0
NAUSEA: 0
SHORTNESS OF BREATH: 0

## 2022-01-09 ASSESSMENT — PAIN DESCRIPTION - ONSET
ONSET: ON-GOING
ONSET: ON-GOING

## 2022-01-09 ASSESSMENT — PAIN DESCRIPTION - ORIENTATION
ORIENTATION: MID

## 2022-01-09 ASSESSMENT — PAIN DESCRIPTION - DIRECTION
RADIATING_TOWARDS: LEFT SHOULDER AND BACK
RADIATING_TOWARDS: LEFT SHOULDER

## 2022-01-09 ASSESSMENT — PAIN DESCRIPTION - LOCATION
LOCATION: CHEST

## 2022-01-09 ASSESSMENT — PAIN DESCRIPTION - DESCRIPTORS
DESCRIPTORS: SQUEEZING
DESCRIPTORS: PRESSURE
DESCRIPTORS: SQUEEZING

## 2022-01-09 NOTE — PROGRESS NOTES
4 Eyes Admission Assessment     I agree as the admission nurse that 2 RN's have performed a thorough Head to Toe Skin Assessment on the patient. ALL assessment sites listed below have been assessed on admission. Areas assessed by both nurses: yes   [x]   Head, Face, and Ears   [x]   Shoulders, Back, and Chest  [x]   Arms, Elbows, and Hands   [x]   Coccyx, Sacrum, and Ischum  [x]   Legs, Feet, and Heels        Does the Patient have Skin Breakdown?   No         Rakan Prevention initiated:  No   Wound Care Orders initiated:  No      [facility] nurse consulted for Pressure Injury (Stage 3,4, Unstageable, DTI, NWPT, and Complex wounds):  No      Nurse 1 eSignature: Electronically signed by Tash Vargas RN on 1/9/2022       **SHARE this note so that the co-signing nurse is able to place an eSignature**    Nurse 2 eSignature: Electronically signed by Jonathan Love RN on 1/9/22 at 7:15 PM EST Discussed with mother her plan for feeding. Reviewed the benefits of exclusive breast milk feeding during the hospital stay. Informed her of the risks of using formula to supplement in the first few days of life as well as the benefits of successful breast milk feeding; referred her to the Breastfeeding booklet about this information. She acknowledges understanding of information reviewed and states that it is her plan to both breast and formula feed her infant. Will support her choice and offer additional information as needed. Pt chooses to do both breast and bottle. Discussed effects of early supplementation on breastfeeding success; may decrease breastmilk production and supply, increase risk for pathological engorgement, baby may develop preference for faster flow from bottles vs breast, and baby's stomach can be stretched if larger volumes of formula are given. Hand Expression Education:  Mom taught how to manually hand express her colostrum. Emphasized the importance of providing infant with valuable colostrum as infant rests skin to skin at breast.  Aware to avoid extended periods of non-feeding. Aware to offer 10-20+ drops of colostrum every 2-3 hours until infant is latching and nursing effectively. Taught the rationale behind this low tech but highly effective evidence based practice. Pt will successfully establish breastfeeding by feeding in response to early feeding cues  
or wake every 3h, will obtain deep latch, and will keep log of feedings/output. Taught to BF at hunger cues and or q 2-3 hrs and to offer 10-20 drops of hand expressed colostrum at any non-feeds. Breast Assessment Left Breast: Extra large Left Nipple: Everted, Intact Right Breast: Extra large Right Nipple: Everted, Intact Breast- Feeding Assessment Attends Breast-Feeding Classes: No 
Breast-Feeding Experience: Yes(formula fed last child, did both with other 3 x a couple mo) Breast Trauma/Surgery: No 
 Type/Quality: Good Lactation Consultant Visits Breast-Feedings: Good Mother/Infant Observation Mother Observation: Alignment, Breast comfortable, Close hold Infant Observation: Latches nipple and aereolae, Lips flanged, lower, Lips flanged, upper, Opens mouth LATCH Documentation Latch: Grasps breast, tongue down, lips flanged, rhythmic sucking Audible Swallowing: A few with stimulation Type of Nipple: Everted (after stimulation) Comfort (Breast/Nipple): Soft/non-tender Hold (Positioning): No assist from staff, mother able to position/hold infant LATCH Score: 9

## 2022-01-09 NOTE — ED NOTES
Pt arrived to the ED via EMS c/o chest pain radiating to the left shoulder. Pt took a dose of nitro which gave relief. Pt states the pain returned with heaviness in the chest, and left arm \"feeling funny. \" Pt has a hx of angina, vascular disease, afib, CHF, COPD, hypertension, stroke without deficits, headaches, kidney disease, DM type 2, arthritis, hyperlipidemia. Pt placed on monitor. /70, other VS stable, 100% SPO2 on RA . AOx4. Pt denies falls but has bad balance and uses a cane. Pt denies shortness of breath.      Brittnee Romero RN  01/09/22 [id number]

## 2022-01-09 NOTE — ED PROVIDER NOTES
**ADVANCED PRACTICE PROVIDER, I HAVE EVALUATED THIS PATIENT**        [address] [facility] ENCOUNTER      Pt Name: Krystina Gonzales  MALLY:[id number]  Solitariogfcedrick 1955  Date of evaluation: 1/9/2022  Provider: CYRUS Welch - CNP      Chief Complaint:    Chief Complaint   Patient presents with    Chest Pain     discomfort yesterday, relief with nitro, returned this morning at 0830. Left arm feeling \"funny\". Heaviness in chest.         Nursing Notes, Past Medical Hx, Past Surgical Hx, Social Hx, Allergies, and Family Hx were all reviewed and agreed with or any disagreements were addressed in the HPI.    HPI: (Location, Duration, Timing, Severity, Quality, Assoc Sx, Context, Modifying factors)    Chief Complaint of who presents with chest pain    This is a  73 y.o. female who presents with chest pain that started yesterday evening, got better after 2 nitroglycerin and the pain started again at 730 this morning. Patient states that she is having anterior chest tightness. Patient denies any cough, congestion, fever or chills. She rates the pain a 7 out of 10, does report it goes into the left shoulder. Patient has not taking any additional medications, no dorsal symptoms, she does have a history of coronary artery disease, atrial fibrillation, chronic kidney disease, high cholesterol. She denies any abdominal pain, nausea vomiting or diarrhea. She denies any additional complaints. Noticed no aggravating relieving factors.   Patient presents awake, alert and in no acute distress or toxic appearance    PastMedical/Surgical History:      Diagnosis Date    Acid reflux     Anemia     Anxiety and depression     Arthritis     Asthma     Atrial fibrillation (HCC)     CAD (coronary artery disease) 12/3/2012    Cerebral artery occlusion with cerebral infarction [facility])     TIA\"\"S--right sided weakness & headache    [facility]est[facility] failure) [facility] [facility]     Chronic kidney disease--stage III     40% kidney function    COPD (chronic obstructive pulmonary disease) (HCC)     DM2 (diabetes mellitus, type 2) ([facility]     Dysarthria     Fibromyalgia 6/7/2016    Headache(784.0) 2/19/2013    Hemisensory loss     History of blood transfusion 11/2020    pt denies having transfusion reaction    Hyperlipidemia     Hypertension     IBS (irritable bowel syndrome)     Inferior vena cava occlusion (HCC)     Keratitis     MI, old     Neuropathy     Superior vena cava obstruction     Temporal arteritis ([facility] [address] 2/24/2014    Wears glasses          Procedure Laterality Date    ABLATION OF DYSRHYTHMIC FOCUS  1999  and 11/2020    times 2    ARTERY BIOPSY Right 04/23/2014    RIGHT TEMPORAL ARTERY BIOPSY    CAROTID ARTERY SURGERY Left     clean up per pt    CATARACT REMOVAL Bilateral     CHOLECYSTECTOMY      COLONOSCOPY N/A 4/9/2021    COLONOSCOPY WITH BIOPSY performed by Suze Martinez MD at [facility] 10/15/2021    [facility]    HYSTERECTOMY      JOINT REPLACEMENT Right     KNEE ARTHROSCOPY Right     PTCA  10/2019    LAD and RCA inrtervention    TUNNELED VENOUS PORT PLACEMENT      left thigh.   SMART PORT-----Removed--total of 4 port placement and removal    UPPER GASTROINTESTINAL ENDOSCOPY N/A 7/6/2020    EGD DIAGNOSTIC ONLY performed by Arik Love MD at [address] [facility]       Medications:  Previous Medications    ALBUTEROL (PROVENTIL) (2.5 MG/3ML) 0.083% NEBULIZER SOLUTION    Take 3 mLs by nebulization every 4 hours as needed for Wheezing    ALBUTEROL SULFATE HFA (VENTOLIN HFA) 108 (90 BASE) MCG/ACT INHALER    Inhale 2 puffs into the lungs every 4 hours as needed for Wheezing or Shortness of Breath    ALIROCUMAB (PRALUENT) 75 MG/ML SOAJ INJECTION PEN    Inject 1 mL into the skin every 14 days    AMLODIPINE (NORVASC) 5 MG TABLET    Take 1 tablet by mouth 2 times daily    ASPIRIN 81 MG CHEWABLE TABLET    Take 1 tablet by mouth daily    ATORVASTATIN (LIPITOR) 80 MG TABLET    TAKE 1 TABLET BY MOUTH NIGHTLY    BLOOD GLUCOSE TEST STRIPS (TRUE METRIX BLOOD GLUCOSE TEST) STRIP    1 each by Does not apply route 2 times daily    BUDESONIDE-FORMOTEROL (SYMBICORT) 160-4.5 MCG/ACT AERO    Inhale 2 puffs into the lungs daily    CLOPIDOGREL (PLAVIX) 75 MG TABLET    TAKE ONE TABLET BY MOUTH EVERY DAY    DICYCLOMINE (BENTYL) 20 MG TABLET    Take 20 mg by mouth 4 times daily (before meals and nightly)     DULOXETINE (CYMBALTA) 60 MG EXTENDED RELEASE CAPSULE    Take 1 capsule by mouth daily    ERENUMAB-AOOE (AIMOVIG) 70 MG/ML SOAJ    INJECT 140 MLS INTO THE SKIN EVERY 30 DAYS    ERENUMAB-AOOE 140 MG/ML SOAJ    Inject 140 mLs into the skin every 30 days    INSULIN ASPART (NOVOLOG FLEXPEN) 100 UNIT/ML INJECTION PEN    Inject 3 Units into the skin 3 times daily (before meals)    INSULIN GLARGINE (BASAGLAR KWIKPEN) 100 UNIT/ML INJECTION PEN    Inject 8 Units into the skin 2 times daily    ISOSORBIDE MONONITRATE (IMDUR) 30 MG EXTENDED RELEASE TABLET    TAKE 1 TABLET BY MOUTH DAILY    LINACLOTIDE (LINZESS) 145 MCG CAPSULE    Take 1 capsule by mouth every morning (before breakfast)    METOPROLOL SUCCINATE (TOPROL XL) 50 MG EXTENDED RELEASE TABLET    Take 50 mg by mouth nightly    NITROGLYCERIN (NITROSTAT) 0.4 MG SL TABLET    Place 1 tablet under the tongue every 5 minutes as needed for Chest pain up to max of 3 total doses. If no relief after 1 dose, call 911.     NUTRITIONAL SUPPLEMENTS (ENSURE) LIQD    Take 1 Can by mouth 3 times daily as needed (nutrition)    OMEPRAZOLE (PRILOSEC) 20 MG DELAYED RELEASE CAPSULE    TAKE 1 CAPSULE BY MOUTH DAILY    ONDANSETRON (ZOFRAN) 4 MG TABLET    Take 1 tablet by mouth every 8 hours as needed for Nausea or Vomiting    OXYCODONE-ACETAMINOPHEN (PERCOCET) 7.5-325 MG PER TABLET    Take 1 tablet by mouth every 6 hours as needed for Pain for up to 35 days. QUETIAPINE (SEROQUEL) 25 MG TABLET    Take 1 tablet by mouth nightly    RANOLAZINE (RANEXA) 1000 MG EXTENDED RELEASE TABLET    Take 1 tablet by mouth 2 times daily    TIZANIDINE (ZANAFLEX) 4 MG TABLET    Take 0.5-1 tablets by mouth 2 times daily as needed (muscle spasms)    TORSEMIDE (DEMADEX) 10 MG TABLET    TAKE 1 TABLET BY MOUTH DAILY    UBROGEPANT (UBRELVY) 50 MG TABS    Take 1 tablet po PRN at start of headaches, can repeat in 24 hours    ULTICARE MINI PEN NEEDLES 31G X 6 MM MISC    USE WITH INSULINS FOUR TIMES A DAY    UNIFINE PENTIPS 31G X 8 MM MISC    USE WITH insulin pens         Review of Systems:  (2-9 systems needed)  Review of Systems   Constitutional: Negative for chills and fever. HENT: Negative for congestion. Respiratory: Negative for cough, shortness of breath and wheezing. Cardiovascular: Positive for chest pain. Patient complains of chest pain that started yesterday evening, got better after 2 nitroglycerin and the pain started again at 730 this morning. Patient states that she is having anterior chest tightness. Patient denies any cough, congestion, fever or chills. She rates the pain a 7 out of 10, does report it goes into the left shoulder. Gastrointestinal: Negative for abdominal pain, diarrhea, nausea and vomiting. Genitourinary: Negative for difficulty urinating, dysuria and frequency. Musculoskeletal: Negative for back pain. Skin: Negative for color change. Neurological: Negative for weakness, numbness and headaches. \"Positives and Pertinent negatives as per HPI\"    Physical Exam:  Physical Exam  Vitals and nursing note reviewed. Constitutional:       Appearance: She is well-developed. She is not diaphoretic. HENT:      Head: Normocephalic. Right Ear: External ear normal.      Left Ear: External ear normal.   Eyes:      General: No scleral icterus. Right eye: No discharge. Left eye: No discharge.    Cardiovascular: Rate and Rhythm: Normal rate. Comments: Normal S1 and 2, peripheral pulses 2+, no edema  Pulmonary:      Effort: Pulmonary effort is normal. No respiratory distress. Breath sounds: Normal breath sounds. Comments: Lungs are clear anteriorly and posteriorly, patient is not tachypneic or dyspneic, saturations are 100% on room air. Abdominal:      Palpations: Abdomen is soft. Musculoskeletal:         General: Normal range of motion. Cervical back: Normal range of motion and neck supple. Skin:     General: Skin is warm. Capillary Refill: Capillary refill takes less than 2 seconds. Coloration: Skin is not pale. Neurological:      General: No focal deficit present. Mental Status: She is alert and oriented to person, place, and time. GCS: GCS eye subscore is 4. GCS verbal subscore is 5. GCS motor subscore is 6.    Psychiatric:         Behavior: Behavior normal.         MEDICAL DECISION MAKING    Vitals:    Vitals:    01/09/22 1348 01/09/22 1400 01/09/22 1415 01/09/22 1430   BP: (!) 177/66 (!) 164/67 (!) 129/112 (!) 188/70   Pulse: 65 65 69 66   Resp: 10 19 20 13   Temp:       TempSrc:       SpO2:    98%   Weight:       Height:           LABS:  Labs Reviewed   CBC WITH AUTO DIFFERENTIAL - Abnormal; Notable for the following components:       Result Value    RBC 3.75 (*)     Hemoglobin 11.9 (*)     Hematocrit 35.6 (*)     All other components within normal limits    Narrative:     Performed at:  [address] [address]   Phone [phone]   COMPREHENSIVE METABOLIC PANEL W/ REFLEX TO MG FOR LOW K - Abnormal; Notable for the following components:    CO2 20 (*)     Glucose 231 (*)     BUN 22 (*)     GFR Non- 45 (*)     GFR African American 55 (*)     Albumin/Globulin Ratio 0.9 (*)     All other components within normal limits    Narrative:     Performed at:  [facility]  [address] [facility] [place], [place] [facility] [address]   Phone [phone]   TROPONIN    Narrative:     Performed at:  [address] [facility]  [address] [facility] [address]   Phone [phone] of labs reviewed and were negative at this time or not returned at the time of this note. RADIOLOGY:   Non-plain film images such as CT, Ultrasound and MRI are read by the radiologist. Angela HARRIS APRN - CNP have directly visualized the radiologic plain film image(s) with the below findings:      Interpretation per the Radiologist below, if available at the time of this note:    XR CHEST (2 VW)   Final Result   1. No acute abnormality. MEDICAL DECISION MAKING / ED COURSE:      PROCEDURES:   Procedures    None    Patient was given:  Medications   morphine (PF) injection 4 mg (4 mg IntraVENous Given 1/9/22 1229)   ondansetron (ZOFRAN) injection 4 mg (4 mg IntraVENous Given 1/9/22 1229)   aspirin chewable tablet 324 mg (324 mg Oral Given 1/9/22 1232)   morphine (PF) injection 4 mg (4 mg IntraVENous Given 1/9/22 1434)     Patient complains of chest pain that started yesterday evening, got better after 2 nitroglycerin and the pain started again at 730 this morning. Patient states that she is having anterior chest tightness. Patient denies any cough, congestion, fever or chills. She rates the pain a 7 out of 10, does report it goes into the left shoulder. After evaluation and examination the patient IV access, blood work, chest x-ray and EKG were ordered, patient was ordered morphine and aspirin with the nitroglycerin she took at home was ineffective. EKG shows sinus rhythm rate of 91 bpm, no acute ST elevation, please see attending physician documentation for EKG interpretation note I did evaluate the patient's chart.  Patient was recently admitted to the hospital on November 29, 2021, at that time she had a recent angiogram that showed nonobstructive diffuse triple-vessel disease but no obvious lesion and medical management was recommended at that time. She had a EDUARD which showed a normal EF and wall motion, there was no further ischemic work-up completed and her medications were adjusted. However, she is back again today because of the pain. CBC shows no sepsis or anemia. Metabolic panel shows no electrolyte disturbances however, chronic kidney with a GFR 55 her creatinine is normal at 1.2 and BUN of 22. Blood sugar slightly elevated at 231, this can be covered with her home diabetes regimen. liver functions are normal.  Troponin is negative. Chest x-ray shows no acute cardiopulmonary findings upon reevaluation vital signs are stable however she still actively having pain. I do of suspicion due to her history of coronary artery disease, heart score of 5, that she would require admission to the hospital.  I then paged the hospitalist on-call and she was given morphine    Therefore, shared medical decision was made between the patient, myself, hospital services, agreed except patient for admission. The patient tolerated their visit well. I evaluated the patient. The physician was available for consultation as needed. The patient and / or the family were informed of the results of any tests, a time was given to answer questions, a plan was proposed and they agreed with plan. Patient will be admitted to the hospital for further valuation management of care. CLINICAL IMPRESSION:  1. Chest pain, unspecified type    2. History of coronary artery disease        DISPOSITION Decision To Admit 01/09/2022 02:18:50 PM      PATIENT REFERRED TO:  No follow-up provider specified.     DISCHARGE MEDICATIONS:  New Prescriptions    No medications on file       DISCONTINUED MEDICATIONS:  Discontinued Medications    No medications on file              (Please note the MDM and HPI sections of this note were completed with a voice recognition program. Efforts were made to edit the dictations but occasionally words are mis-transcribed.)    Electronically signed, CYRUS Burgess CNP,          CYRUS Burgess CNP  01/09/22 [id number]

## 2022-01-09 NOTE — ED NOTES
Bed: D-39  Expected date: 1/9/22  Expected time: 10:31 AM  Means of arrival:   Comments:  66F CP, /90, pain 8/10, h/o [facility]     Devorah Caller, YOLETTE  01/09/22 2471

## 2022-01-09 NOTE — ED NOTES
Pt score 0 for drug and alcohol.  Pt to discuss mood w upcoming PCP appt     Yoni Arguelles  01/09/22 [id number]

## 2022-01-09 NOTE — ED NOTES
Report given to [facility], [address], [facility]  01/09/22 8437

## 2022-01-10 PROBLEM — E44.0 MODERATE MALNUTRITION (HCC): Status: ACTIVE | Noted: 2022-01-10

## 2022-01-10 LAB
ANION GAP SERPL CALCULATED.3IONS-SCNC: 12 MMOL/L (ref 3–16)
BUN BLDV-MCNC: 26 MG/DL (ref 7–20)
CALCIUM SERPL-MCNC: 9 MG/DL (ref 8.3–10.6)
CHLORIDE BLD-SCNC: 108 MMOL/L (ref 99–110)
CO2: 19 MMOL/L (ref 21–32)
CREAT SERPL-MCNC: 1.7 MG/DL (ref 0.6–1.2)
EKG ATRIAL RATE: 56 BPM
EKG ATRIAL RATE: 91 BPM
EKG DIAGNOSIS: NORMAL
EKG DIAGNOSIS: NORMAL
EKG P AXIS: -5 DEGREES
EKG P AXIS: 96 DEGREES
EKG P-R INTERVAL: 112 MS
EKG P-R INTERVAL: 128 MS
EKG Q-T INTERVAL: 350 MS
EKG Q-T INTERVAL: 446 MS
EKG QRS DURATION: 82 MS
EKG QRS DURATION: 86 MS
EKG QTC CALCULATION (BAZETT): 430 MS
EKG QTC CALCULATION (BAZETT): 430 MS
EKG R AXIS: 37 DEGREES
EKG R AXIS: 83 DEGREES
EKG T AXIS: 206 DEGREES
EKG T AXIS: 71 DEGREES
EKG VENTRICULAR RATE: 56 BPM
EKG VENTRICULAR RATE: 91 BPM
ESTIMATED AVERAGE GLUCOSE: 177.2 MG/DL
GFR AFRICAN AMERICAN: 36
GFR NON-AFRICAN AMERICAN: 30
GLUCOSE BLD-MCNC: 105 MG/DL (ref 70–99)
GLUCOSE BLD-MCNC: 136 MG/DL (ref 70–99)
GLUCOSE BLD-MCNC: 140 MG/DL (ref 70–99)
GLUCOSE BLD-MCNC: 161 MG/DL (ref 70–99)
GLUCOSE BLD-MCNC: 163 MG/DL (ref 70–99)
HBA1C MFR BLD: 7.8 %
HCT VFR BLD CALC: 32.5 % (ref 36–48)
HEMOGLOBIN: 10.7 G/DL (ref 12–16)
MCH RBC QN AUTO: 31.9 PG (ref 26–34)
MCHC RBC AUTO-ENTMCNC: 32.8 G/DL (ref 31–36)
MCV RBC AUTO: 97.3 FL (ref 80–100)
PDW BLD-RTO: 14.5 % (ref 12.4–15.4)
PERFORMED ON: ABNORMAL
PLATELET # BLD: 206 K/UL (ref 135–450)
PMV BLD AUTO: 8.3 FL (ref 5–10.5)
POTASSIUM REFLEX MAGNESIUM: 3.8 MMOL/L (ref 3.5–5.1)
RBC # BLD: 3.34 M/UL (ref 4–5.2)
SODIUM BLD-SCNC: 139 MMOL/L (ref 136–145)
TROPONIN: <0.01 NG/ML
WBC # BLD: 3.9 K/UL (ref 4–11)

## 2022-01-10 PROCEDURE — 85027 COMPLETE CBC AUTOMATED: CPT

## 2022-01-10 PROCEDURE — APPNB30 APP NON BILLABLE TIME 0-30 MINS: Performed by: NURSE PRACTITIONER

## 2022-01-10 PROCEDURE — 99221 1ST HOSP IP/OBS SF/LOW 40: CPT | Performed by: SURGERY

## 2022-01-10 PROCEDURE — 97530 THERAPEUTIC ACTIVITIES: CPT

## 2022-01-10 PROCEDURE — 36415 COLL VENOUS BLD VENIPUNCTURE: CPT

## 2022-01-10 PROCEDURE — 84484 ASSAY OF TROPONIN QUANT: CPT

## 2022-01-10 PROCEDURE — 6370000000 HC RX 637 (ALT 250 FOR IP): Performed by: INTERNAL MEDICINE

## 2022-01-10 PROCEDURE — 80048 BASIC METABOLIC PNL TOTAL CA: CPT

## 2022-01-10 PROCEDURE — 97116 GAIT TRAINING THERAPY: CPT

## 2022-01-10 PROCEDURE — 1200000000 HC SEMI PRIVATE

## 2022-01-10 PROCEDURE — 51798 US URINE CAPACITY MEASURE: CPT

## 2022-01-10 PROCEDURE — 93925 LOWER EXTREMITY STUDY: CPT

## 2022-01-10 PROCEDURE — APPSS30 APP SPLIT SHARED TIME 16-30 MINUTES: Performed by: NURSE PRACTITIONER

## 2022-01-10 PROCEDURE — 2580000003 HC RX 258: Performed by: INTERNAL MEDICINE

## 2022-01-10 PROCEDURE — 94760 N-INVAS EAR/PLS OXIMETRY 1: CPT

## 2022-01-10 PROCEDURE — 97166 OT EVAL MOD COMPLEX 45 MIN: CPT

## 2022-01-10 PROCEDURE — 6360000002 HC RX W HCPCS: Performed by: INTERNAL MEDICINE

## 2022-01-10 PROCEDURE — 97161 PT EVAL LOW COMPLEX 20 MIN: CPT

## 2022-01-10 RX ORDER — SODIUM CHLORIDE 9 MG/ML
INJECTION, SOLUTION INTRAVENOUS CONTINUOUS
Status: DISCONTINUED | OUTPATIENT
Start: 2022-01-10 | End: 2022-01-11

## 2022-01-10 RX ORDER — DOCUSATE SODIUM 100 MG/1
100 CAPSULE, LIQUID FILLED ORAL 2 TIMES DAILY
COMMUNITY
Start: 2021-11-29

## 2022-01-10 RX ORDER — AMLODIPINE BESYLATE 2.5 MG/1
2.5 TABLET ORAL DAILY
Status: ON HOLD | COMMUNITY
End: 2022-01-13 | Stop reason: SDUPTHER

## 2022-01-10 RX ORDER — ISOSORBIDE MONONITRATE 60 MG/1
60 TABLET, EXTENDED RELEASE ORAL DAILY
COMMUNITY
Start: 2021-12-07 | End: 2022-01-27 | Stop reason: SDUPTHER

## 2022-01-10 RX ORDER — METOCLOPRAMIDE 10 MG/1
10 TABLET ORAL
Status: ON HOLD | COMMUNITY
Start: 2021-11-29 | End: 2022-01-13 | Stop reason: HOSPADM

## 2022-01-10 RX ADMIN — TORSEMIDE 10 MG: 10 TABLET ORAL at 08:24

## 2022-01-10 RX ADMIN — MORPHINE SULFATE 1 MG: 2 INJECTION, SOLUTION INTRAMUSCULAR; INTRAVENOUS at 21:32

## 2022-01-10 RX ADMIN — DICYCLOMINE HYDROCHLORIDE 20 MG: 20 TABLET ORAL at 17:41

## 2022-01-10 RX ADMIN — INSULIN LISPRO 2 UNITS: 100 INJECTION, SOLUTION INTRAVENOUS; SUBCUTANEOUS at 13:25

## 2022-01-10 RX ADMIN — RANOLAZINE 1000 MG: 500 TABLET, FILM COATED, EXTENDED RELEASE ORAL at 08:24

## 2022-01-10 RX ADMIN — AMLODIPINE BESYLATE 5 MG: 5 TABLET ORAL at 08:23

## 2022-01-10 RX ADMIN — CLOPIDOGREL BISULFATE 75 MG: 75 TABLET, FILM COATED ORAL at 08:24

## 2022-01-10 RX ADMIN — ATORVASTATIN CALCIUM 80 MG: 80 TABLET, FILM COATED ORAL at 21:28

## 2022-01-10 RX ADMIN — SODIUM CHLORIDE: 9 INJECTION, SOLUTION INTRAVENOUS at 18:31

## 2022-01-10 RX ADMIN — DICYCLOMINE HYDROCHLORIDE 20 MG: 20 TABLET ORAL at 21:28

## 2022-01-10 RX ADMIN — PANTOPRAZOLE SODIUM 40 MG: 40 TABLET, DELAYED RELEASE ORAL at 06:07

## 2022-01-10 RX ADMIN — DULOXETINE HYDROCHLORIDE 60 MG: 60 CAPSULE, DELAYED RELEASE ORAL at 08:24

## 2022-01-10 RX ADMIN — DICYCLOMINE HYDROCHLORIDE 20 MG: 20 TABLET ORAL at 06:07

## 2022-01-10 RX ADMIN — INSULIN LISPRO 1 UNITS: 100 INJECTION, SOLUTION INTRAVENOUS; SUBCUTANEOUS at 21:34

## 2022-01-10 RX ADMIN — Medication 10 ML: at 08:25

## 2022-01-10 RX ADMIN — OXYCODONE AND ACETAMINOPHEN 1 TABLET: 7.5; 325 TABLET ORAL at 17:42

## 2022-01-10 RX ADMIN — ONDANSETRON 4 MG: 2 INJECTION INTRAMUSCULAR; INTRAVENOUS at 13:29

## 2022-01-10 RX ADMIN — ASPIRIN 81 MG 81 MG: 81 TABLET ORAL at 08:23

## 2022-01-10 RX ADMIN — ISOSORBIDE MONONITRATE 30 MG: 30 TABLET, EXTENDED RELEASE ORAL at 08:24

## 2022-01-10 RX ADMIN — INSULIN GLARGINE 8 UNITS: 100 INJECTION, SOLUTION SUBCUTANEOUS at 08:26

## 2022-01-10 RX ADMIN — DICYCLOMINE HYDROCHLORIDE 20 MG: 20 TABLET ORAL at 10:49

## 2022-01-10 RX ADMIN — OXYCODONE AND ACETAMINOPHEN 1 TABLET: 7.5; 325 TABLET ORAL at 03:54

## 2022-01-10 RX ADMIN — INSULIN GLARGINE 8 UNITS: 100 INJECTION, SOLUTION SUBCUTANEOUS at 21:34

## 2022-01-10 RX ADMIN — TIZANIDINE 2 MG: 4 TABLET ORAL at 10:49

## 2022-01-10 RX ADMIN — QUETIAPINE FUMARATE 25 MG: 25 TABLET ORAL at 21:28

## 2022-01-10 RX ADMIN — RANOLAZINE 1000 MG: 500 TABLET, FILM COATED, EXTENDED RELEASE ORAL at 21:28

## 2022-01-10 RX ADMIN — ENOXAPARIN SODIUM 40 MG: 100 INJECTION SUBCUTANEOUS at 08:23

## 2022-01-10 RX ADMIN — AMLODIPINE BESYLATE 5 MG: 5 TABLET ORAL at 21:28

## 2022-01-10 RX ADMIN — OXYCODONE AND ACETAMINOPHEN 1 TABLET: 7.5; 325 TABLET ORAL at 10:49

## 2022-01-10 ASSESSMENT — ENCOUNTER SYMPTOMS
SHORTNESS OF BREATH: 0
COLOR CHANGE: 1
ABDOMINAL PAIN: 1

## 2022-01-10 ASSESSMENT — PAIN DESCRIPTION - PROGRESSION
CLINICAL_PROGRESSION: GRADUALLY WORSENING
CLINICAL_PROGRESSION: GRADUALLY WORSENING
CLINICAL_PROGRESSION: NOT CHANGED
CLINICAL_PROGRESSION: NOT CHANGED
CLINICAL_PROGRESSION: GRADUALLY IMPROVING
CLINICAL_PROGRESSION: GRADUALLY WORSENING
CLINICAL_PROGRESSION: NOT CHANGED

## 2022-01-10 ASSESSMENT — PAIN DESCRIPTION - DESCRIPTORS
DESCRIPTORS: PRESSURE

## 2022-01-10 ASSESSMENT — PAIN DESCRIPTION - PAIN TYPE
TYPE: ACUTE PAIN

## 2022-01-10 ASSESSMENT — PAIN SCALES - GENERAL
PAINLEVEL_OUTOF10: 7
PAINLEVEL_OUTOF10: 6
PAINLEVEL_OUTOF10: 5
PAINLEVEL_OUTOF10: 8
PAINLEVEL_OUTOF10: 9
PAINLEVEL_OUTOF10: 4
PAINLEVEL_OUTOF10: 6

## 2022-01-10 ASSESSMENT — PAIN DESCRIPTION - ONSET
ONSET: GRADUAL
ONSET: ON-GOING
ONSET: GRADUAL
ONSET: ON-GOING

## 2022-01-10 ASSESSMENT — PAIN DESCRIPTION - DIRECTION
RADIATING_TOWARDS: ARM
RADIATING_TOWARDS: BACK
RADIATING_TOWARDS: ARM

## 2022-01-10 ASSESSMENT — PAIN DESCRIPTION - ORIENTATION
ORIENTATION: LEFT;UPPER
ORIENTATION: MID
ORIENTATION: UPPER;LEFT
ORIENTATION: MID

## 2022-01-10 ASSESSMENT — PAIN - FUNCTIONAL ASSESSMENT
PAIN_FUNCTIONAL_ASSESSMENT: PREVENTS OR INTERFERES SOME ACTIVE ACTIVITIES AND ADLS
PAIN_FUNCTIONAL_ASSESSMENT: ACTIVITIES ARE NOT PREVENTED
PAIN_FUNCTIONAL_ASSESSMENT: ACTIVITIES ARE NOT PREVENTED
PAIN_FUNCTIONAL_ASSESSMENT: PREVENTS OR INTERFERES SOME ACTIVE ACTIVITIES AND ADLS
PAIN_FUNCTIONAL_ASSESSMENT: PREVENTS OR INTERFERES SOME ACTIVE ACTIVITIES AND ADLS

## 2022-01-10 ASSESSMENT — PAIN DESCRIPTION - FREQUENCY
FREQUENCY: INTERMITTENT
FREQUENCY: CONTINUOUS
FREQUENCY: INTERMITTENT
FREQUENCY: INTERMITTENT
FREQUENCY: CONTINUOUS
FREQUENCY: CONTINUOUS
FREQUENCY: INTERMITTENT

## 2022-01-10 ASSESSMENT — PAIN DESCRIPTION - LOCATION
LOCATION: CHEST

## 2022-01-10 ASSESSMENT — PAIN SCALES - WONG BAKER: WONGBAKER_NUMERICALRESPONSE: 0

## 2022-01-10 NOTE — PROGRESS NOTES
Pt arrived to floor from ER via stretcher. Pt oriented to room, call light, policies and procedures, the menu and ordering. Call light within reach. Bed in lowest position, bed alarm on, and wheels locked. Pt verbalized understanding. No complaints, questions or concerns at this time.   Electronically signed by Bladimir Mccray RN on 1/9/2022

## 2022-01-10 NOTE — PLAN OF CARE
Problem: Falls - Risk of:  Goal: Will remain free from falls  Description: Will remain free from falls  1/10/2022 0227 by Alex Cleaning RN  Outcome: Ongoing  Note: Patient educated on fall prevention. Call light is within reach, bed locked in lowest position, personal items within reach, and bed alarm is on. Will round on patient per unit guidelines. 1/9/2022 1817 by Gaurav Cai RN  Outcome: Ongoing  Goal: Absence of physical injury  Description: Absence of physical injury  1/10/2022 0227 by Alex Cleaning RN  Outcome: Ongoing  Note: Pt assessed for fall risk and fall precautions put into place. Bed in lowest position and wheels locked, call light within reach. Nonskid footwear in place. Patient educated on appropriate method of transfer and to call for assistance. 1/9/2022 1817 by Gaurav Cai RN  Outcome: Ongoing     Problem: Pain:  Goal: Pain level will decrease  Description: Pain level will decrease  1/10/2022 0227 by Alex Cleaning RN  Outcome: Ongoing  Note: Educated patient on pain management. Will assess patients pain level per unit protocol, and provide pain management measures as needed. 1/9/2022 1817 by Gaurav Cai RN  Outcome: Ongoing  Goal: Control of acute pain  Description: Control of acute pain  1/10/2022 0227 by Alex Cleaning RN  Outcome: Ongoing  Note: Patient educated on acute pain. Taught patient to use call light to ask for pain medication. PRN pain medication given for acute pain. Will continue to monitor pain per unit protocol. 1/9/2022 1817 by Gaurav Cai RN  Outcome: Ongoing  Goal: Control of chronic pain  Description: Control of chronic pain  1/10/2022 0227 by Alex Cleaning RN  Outcome: Ongoing  Note: Patient educated on chronic pain. Taught patient to use call light to ask for pain medication. Will continue to monitor pain per unit protocol.      1/9/2022 1817 by Gaurav Cai RN  Outcome: Ongoing

## 2022-01-10 NOTE — H&P
Hospital Medicine History & Physical      PCP: Emil House MD    Date of Admission: 1/9/2022    Chief Complaint:  Chest pain    History Of Present Illness:  Patient is a 61-year-old female with past medical history of atrial fibrillation CAD, COPD diabetes mellitus who presents to the hospital for chest pain. According to patient she has chest pain, substernal, 7/10 intensity, which is started while she was sleeping at 7 AM in the morning, mentions it radiates from her left shoulder, associated with some shortness of breath, 7/10 intensity. Mentions she had similar episodes of chest pains in the past when she had to get stents placed mentions she has 6-7 stents. Otherwise denied nausea vomiting diarrhea constipation dysuria.       Past Medical History:          Diagnosis Date    Acid reflux     Anemia     Anxiety and depression     Arthritis     Asthma     Atrial fibrillation (HCC)     CAD (coronary artery disease) 12/3/2012    Cerebral artery occlusion with cerebral infarction [facility])     TIA\"\"S--right sided weakness & headache    CHF [facility]congestive heart failure) ([facility]     Chronic kidney disease--stage III     40% kidney function    COPD (chronic obstructive pulmonary disease) ([address]     DM2 (diabetes mellitus, type 2) ([facility]     Dysarthria     Fibromyalgia 6/7/2016    Headache(784.0) 2/19/2013    Hemisensory loss     History of blood transfusion 11/2020    pt denies having transfusion reaction    Hyperlipidemia     Hypertension     IBS (irritable bowel syndrome)     Inferior vena cava occlusion (HCC)     Keratitis     MI, old     Neuropathy     Superior vena cava obstruction     Temporal arteritis ([facility] [address] 2/24/2014    Wears glasses        Past Surgical History:          Procedure Laterality Date    ABLATION OF DYSRHYTHMIC FOCUS  1999  and 11/2020    times 2    ARTERY BIOPSY Right 04/23/2014    RIGHT TEMPORAL ARTERY BIOPSY    CAROTID ARTERY SURGERY Left     clean up per pt    CATARACT REMOVAL Bilateral     CHOLECYSTECTOMY      COLONOSCOPY N/A 4/9/2021    COLONOSCOPY WITH BIOPSY performed by Nabil Bowman MD at [address] N/A 10/15/2021    COLONOSCOPY performed by Nabil Bowman MD at [address]    HYSTERECTOMY      JOINT REPLACEMENT Right     KNEE ARTHROSCOPY Right     PTCA  10/2019    LAD and RCA inrtervention    TUNNELED VENOUS PORT PLACEMENT      left thigh. SMART PORT-----Removed--total of 4 port placement and removal    UPPER GASTROINTESTINAL ENDOSCOPY N/A 7/6/2020    EGD DIAGNOSTIC ONLY performed by Pee Correia MD at [address] [facility]       Medications Prior to Admission:      Prior to Admission medications    Medication Sig Start Date End Date Taking? Authorizing Provider   ranolazine (RANEXA) 1000 MG extended release tablet Take 1 tablet by mouth 2 times daily 10/29/21  Yes Olayinka Stoner MD   metoprolol succinate (TOPROL XL) 50 MG extended release tablet Take 50 mg by mouth nightly   Yes Historical Provider, MD   insulin aspart (NOVOLOG FLEXPEN) 100 UNIT/ML injection pen Inject 3 Units into the skin 3 times daily (before meals) 7/20/21  Yes Cris Anders MD   insulin glargine (BASAGLAR KWIKPEN) 100 UNIT/ML injection pen Inject 8 Units into the skin 2 times daily 7/20/21  Yes Cris Anders MD   amLODIPine (NORVASC) 5 MG tablet Take 1 tablet by mouth 2 times daily 10/1/20  Yes Cris Anders MD   Erenumab-aooe (AIMOVIG) 70 MG/ML SOAJ INJECT 140 MLS INTO THE SKIN EVERY 30 DAYS 12/15/21   Renetta Bacon MD   oxyCODONE-acetaminophen (PERCOCET) 7.5-325 MG per tablet Take 1 tablet by mouth every 6 hours as needed for Pain for up to 35 days.  12/7/21 1/11/22  Renetta Bacon MD   tiZANidine (ZANAFLEX) 4 MG tablet Take 0.5-1 tablets by mouth 2 times daily as needed (muscle spasms) 12/7/21   Renetta Bacon MD   DULoxetine (CYMBALTA) 60 MG extended release capsule Take 1 capsule by mouth daily 12/7/21   Jemal Salvador MD   Ubrogepant (UBRELVY) 50 MG TABS Take 1 tablet po PRN at start of headaches, can repeat in 24 hours 12/7/21   Jemal Salvador MD   QUEtiapine (SEROQUEL) 25 MG tablet Take 1 tablet by mouth nightly 12/7/21   Jemal Salvador MD   atorvastatin (LIPITOR) 80 MG tablet TAKE 1 TABLET BY MOUTH NIGHTLY 11/2/21   John Billingsley MD   torsemide (DEMADEX) 10 MG tablet TAKE 1 TABLET BY MOUTH DAILY 11/2/21   John Billingsley MD   isosorbide mononitrate (IMDUR) 30 MG extended release tablet TAKE 1 TABLET BY MOUTH DAILY 11/2/21   John Billingsley MD   clopidogrel (PLAVIX) 75 MG tablet TAKE ONE TABLET BY MOUTH EVERY DAY 11/2/21   John Billingsley MD   linaclotide [facility]) 145 MCG capsule Take 1 capsule by mouth every morning (before breakfast) 10/15/21   Geremias Dickey MD   Erenumab-aooe 140 MG/ML SOAJ Inject 140 mLs into the skin every 30 days 8/20/21   Jemal Salvador MD   ondansetron (ZOFRAN) 4 MG tablet Take 1 tablet by mouth every 8 hours as needed for Nausea or Vomiting 7/20/21   John Billingsley MD   ULTICARE MINI PEN NEEDLES 31G X 6 MM MISC USE WITH INSULINS FOUR TIMES A DAY 7/19/21   John Billingsley MD   UNIFINE PENTIPS 31G X 8 MM MISC USE WITH insulin pens 4/21/21   John Billingsley MD   alirocumab (PRALUENT) 75 MG/ML SOAJ injection pen Inject 1 mL into the skin every 14 days 3/29/21   Scarketan Lee, APRN - CNP   omeprazole (PRILOSEC) 20 MG delayed release capsule TAKE 1 CAPSULE BY MOUTH DAILY 3/5/21   John Billingsley MD   aspirin 81 MG chewable tablet Take 1 tablet by mouth daily 12/24/20   Deven Luna MD   dicyclomine (BENTYL) 20 MG tablet Take 20 mg by mouth 4 times daily (before meals and nightly)  12/9/20   Historical Provider, MD   nitroGLYCERIN (NITROSTAT) 0.4 MG SL tablet Place 1 tablet under the tongue every 5 minutes as needed for Chest pain up to max of 3 total doses.  If no relief after 1 dose, call 911. 12/16/20   John Billingsley MD   Nutritional Supplements (ENSURE) LIQD Take 1 Can by mouth 3 times daily as needed (nutrition) 12/16/20   Margarito Curiel MD   blood glucose test strips (TRUE METRIX BLOOD GLUCOSE TEST) strip 1 each by Does not apply route 2 times daily 9/25/20   Margarito Curiel MD   albuterol (PROVENTIL) (2.5 MG/3ML) 0.083% nebulizer solution Take 3 mLs by nebulization every 4 hours as needed for Wheezing 3/25/20   Margarito Curiel MD   budesonide-formoterol [facility]) 160-4.5 MCG/ACT AERO Inhale 2 puffs into the lungs daily 12/6/19   Margarito Curiel MD   albuterol sulfate HFA (VENTOLIN HFA) 108 (90 Base) MCG/ACT inhaler Inhale 2 puffs into the lungs every 4 hours as needed for Wheezing or Shortness of Breath 12/6/19   Margarito Curiel MD       Allergies:  Patient has no known allergies. Social History:      TOBACCO:   reports that she quit smoking about 3 years ago. Her smoking use included cigarettes. She has a 17.50 pack-year smoking history. She has never used smokeless tobacco.  ETOH:   reports no history of alcohol use. Family History:       Reviewed in detail and non contributory          Problem Relation Age of Onset    Diabetes Mother     High Cholesterol Mother     Stroke Mother     Cancer Mother     High Blood Pressure Mother     No Known Problems Paternal Grandfather         lung issues        REVIEW OF SYSTEMS:   Pertinent positives as noted in the HPI. All other systems reviewed and negative. PHYSICAL EXAM PERFORMED:    /62   Pulse 63   Temp 97.9 °F (36.6 °C) (Oral)   Resp 16   Ht 5' (1.524 m)   Wt 111 lb 8.8 oz (50.6 kg)   SpO2 97%   BMI 21.79 kg/m²     General appearance:  No apparent distress, cooperative. HEENT:  Normal cephalic, atraumatic without obvious deformity. Conjunctivae/corneas clear. Neck: Supple, with full range of motion. No cervical lymphadenopathy  Respiratory:  Normal respiratory effort. Clear to auscultation, bilaterally without Rales/Wheezes/Rhonchi.   Cardiovascular:  Regular rate and rhythm with normal S1/S2 without murmurs, rubs or gallops. Abdomen: Soft, non-tender, non-distended, normal bowel sounds. Musculoskeletal:  No edema noted bilaterally. No tenderness on palpation   Skin: no rash visible  Neurologic:  Neurologically intact without any focal sensory/motor deficits. grossly non-focal.  Psychiatric:  Alert and oriented, normal mood  Peripheral Pulses: +2 palpable, equal bilaterally       Labs:     Recent Labs     01/09/22  1233   WBC 4.9   HGB 11.9*   HCT 35.6*        Recent Labs     01/09/22  1233      K 4.2      CO2 20*   BUN 22*   CREATININE 1.2   CALCIUM 9.7     Recent Labs     01/09/22  1233   AST 23   ALT 13   BILITOT 0.4   ALKPHOS 120     No results for input(s): INR in the last 72 hours. Recent Labs     01/09/22  1233   TROPONINI <0.01       Urinalysis:      Lab Results   Component Value Date    NITRU Negative 11/26/2021    WBCUA 15 11/26/2021    BACTERIA 1+ 11/26/2021    RBCUA 3 11/26/2021    BLOODU TRACE-INTACT 11/26/2021    SPECGRAV 1.025 11/26/2021    GLUCOSEU Negative 11/26/2021    GLUCOSEU NEGATIVE 05/14/2012       Radiology:       XR CHEST (2 VW)   Final Result   1. No acute abnormality. Active Hospital Problems    Diagnosis Date Noted    Chest pain [R07.9] 12/22/2020       Patient is a 44-year-old female with past medical history of atrial fibrillation CAD, COPD diabetes mellitus who presents to the hospital for chest pain. According to patient she has chest pain, substernal, 7/10 intensity, which is started while she was sleeping at 7 AM in the morning, mentions it radiates from her left shoulder, associated with some shortness of breath, 7/10 intensity. Mentions she had similar episodes of chest pains in the past when she had to get stents placed mentions she has 6-7 stents. Otherwise denied nausea vomiting diarrhea constipation dysuria.     Assessment  Chest pain, rule out ACS  CAD  Diabetes mellitus  COPD  Atrial fibrillation    Plan  Monitor troponin, monitor on cardiac telemetry, consult cardiology, status post nitroglycerin, resume home aspirin, Plavix  Insulin sliding scale  DuoNeb as needed  DVT prophylaxis-Lovenox  Diet: ADULT DIET; Regular; 4 carb choices (60 gm/meal); Low Potassium (Less than 3000 mg/day); Low Phosphorus (Less than 1000 mg)  Code Status: Full Code    PT/OT Eval Status: ordered    Dispo - pending clinical improvement       Serena Lunsford MD    The note was completed using EMR and Dragon dictation system. Every effort was made to ensure accuracy; however, inadvertent computerized transcription errors may be present. Thank you Rayshawn Rodrigues MD for the opportunity to be involved in this patient's care. If you have any questions or concerns please feel free to contact me at [phone].     Serena Lunsford MD

## 2022-01-10 NOTE — PROGRESS NOTES
Occupational Therapy   Occupational Therapy Initial Assessment  Date: 1/10/2022   Patient Name: Krystina Gonzales  MRN: [id number]     : 1955    Date of Service: 1/10/2022    Discharge Recommendations:  Home with assist PRN       Krystina Gonzales scored a 19/24 on the AM-PAC ADL Inpatient form. At this time, no further OT is recommended upon discharge. Recommend patient returns to prior setting with prior services. Assessment   Performance deficits / Impairments: Decreased functional mobility ; Decreased endurance;Decreased ADL status; Decreased high-level IADLs;Decreased balance;Decreased posture  Assessment: 74 yo female admitted for Chest and R sided neck pain and KOLBY. PMH: neuropathy, PVD, CAD, CHF, HTN, DM, TIA (R side weaker), AFib, CKD, IBS, Fibromyalgia. PTA, pt lives alone with assist from aide for IADLs, otherwise, pt independent with ADL and fxl mobility. Today, pt functioning below, yet near baseline limited by fatigue and R neck/shoulder pain. Pt completes bed mobility SUP, and fxl tx and mobility with RW with SBA. Pt with little to no unsteadiness, however, one LOB- able to correct on own. Pt with R neck pain and stiffness, educated on/completes importance of gentle ROM and stretching. Pt declines ADL needs, anticipate SBA/SUP LB and UB ADL based on balance, endurance, cognition, pain and PLOF. Cont acute OT to address above deficits.  Anticipate if progresses to Mod I, able to d/c home to daily assist from aide for IADLs  Treatment Diagnosis: impaired ADL/fxl mobility  Prognosis: Good  Decision Making: Medium Complexity  OT Education: OT Role;Transfer Training;Plan of Care  Patient Education: Importance of gentle neck and shoulder AROM to prevent stiffness d/t pain  REQUIRES OT FOLLOW UP: Yes  Activity Tolerance  Activity Tolerance: Patient Tolerated treatment well;Patient limited by pain  Activity Tolerance: R neck pain  Safety Devices  Safety Devices in place: Yes  Type of devices: Nurse notified;Gait belt;Call light within reach; Left in chair;Patient at risk for falls (RN aware of no chair alarm in room)         Patient Diagnosis(es): The primary encounter diagnosis was Chest pain, unspecified type. A diagnosis of History of coronary artery disease was also pertinent to this visit. has a past medical history of Acid reflux, Anemia, Anxiety and depression, Arthritis, Asthma, Atrial fibrillation ([address] CAD (coronary artery disease), Cerebral artery occlusion with cerebral infarction ([address] CHF (congestive heart failure) ([address] Chronic kidney disease--stage III, COPD (chronic obstructive pulmonary disease) ([address] DM2 (diabetes mellitus, type 2) ([address] Dysarthria, Fibromyalgia, Headache(784.0), Hemisensory loss, History of blood transfusion, Hyperlipidemia, Hypertension, IBS (irritable bowel syndrome), Inferior vena cava occlusion ([address] Keratitis, MI, old, Neuropathy, Superior vena cava obstruction, Temporal arteritis ([address] and Wears glasses. has a past surgical history that includes Tunneled venous port placement; Cholecystectomy; ablation of dysrhythmic focus (1999  and 11/2020); Cataract removal (Bilateral); joint replacement (Right); Hysterectomy; Artery Biopsy (Right, 04/23/2014); Coronary angioplasty with stent (2020); Knee arthroscopy (Right); Percutaneous Transluminal Coronary Angio (10/2019); Upper gastrointestinal endoscopy (N/A, 7/6/2020); Carotid artery surgery (Left); Colonoscopy (N/A, 4/9/2021); and Colonoscopy (N/A, 10/15/2021). Treatment Diagnosis: impaired ADL/fxl mobility      Restrictions  Restrictions/Precautions  Restrictions/Precautions: Fall Risk    Subjective   General  Chart Reviewed: Yes  Patient assessed for rehabilitation services?: Yes  Additional Pertinent Hx: 74 yo female admitted for Chest and R sided neck pain and KOLBY.  PMH: neuropathy, PVD, CAD, CHF, HTN, DM, TIA (R side weaker), AFib, CKD, IBS, Fibromyalgia  Family / Caregiver Present: No  Referring Practitioner: Shannon Chapman MD  Diagnosis: Chest pain  Subjective  Subjective: Pt resting in bed upon arrival and agreeable to OT/PT eval. Pt reporting R neck pain 8/10. General Comment  Comments: RN ok to see    Social/Functional History  Social/Functional History  Lives With: Alone  Type of Home: Apartment (3rd floor)  Home Layout: One level  Home Access: Level entry,Elevator  Bathroom Shower/Tub: Walk-in shower  Bathroom Toilet: Standard  Bathroom Equipment: Grab bars in shower,Built-in shower seat,Grab bars around toilet  Bathroom Accessibility: Walker accessible  Home Equipment: Cane,Lift chair,Alert Button  ADL Assistance: Needs assistance (aide assists as needed)  Homemaking Assistance: Needs assistance (meals on wheels 5x/week, aide assist with laundry and cleaning)  Ambulation Assistance: Independent (with cane as needed inside. Always in community)  Transfer Assistance: Independent  Active : No  Occupation: On disability  Leisure & Hobbies: Listen to music. IADL Comments: HHA 5 hours/day, 5 days per week:  cooking, laundry, grocery, cleaning. Meals on Wheels  Additional Comments: sleeps in flat bed       Objective   Vision: Impaired  Vision Exceptions: Wears glasses at all times  Hearing: Within functional limits    Orientation  Overall Orientation Status: Within Normal Limits     Balance  Sitting Balance: Supervision  Standing Balance: Stand by assistance (PRN tx)  Functional Mobility  Functional - Mobility Device: Rolling Walker  Activity:  (EOB>within baires>recliner)  Assist Level: Stand by assistance  Functional Mobility Comments: no unsteadiness, small LOB able to correct self.  Standing rest breaks PRN in baires d/t R neck pain  Toilet Transfers  Toilet - Technique: Ambulating  Equipment Used: Grab bars  Toilet Transfer: Stand by assistance  Toilet Transfers Comments: cues for hand placement  ADL  Additional Comments: Pt declines ADL needs, anticipate SBA/SUP LB and UB ADL based on balance, endurance, cognition, pain and PLOF  Tone RUE  RUE Tone: Normotonic  Tone LUE  LUE Tone: Normotonic  Coordination  Movements Are Fluid And Coordinated: Yes     Bed mobility  Supine to Sit: Supervision  Scooting: Supervision  Comment: HOB elevated some. Transfers  Sit to stand: Stand by assistance  Stand to sit: Stand by assistance  Transfer Comments: RW. cues for hand placement     Cognition  Overall Cognitive Status: WFL        Sensation  Overall Sensation Status:  (reports numbness B feet, and fingertip numbness B hands.  R fingertips>L since R neck pain began)  Type of ROM/Therapeutic Exercise  Comment: gentle seated shoulder rolls and neck stretching rotation and lateral flexion (x3 each direction)     LUE AROM (degrees)  LUE AROM : WFL  RUE AROM (degrees)  RUE AROM : WFL  RUE General AROM: limited shoulder flex ~100 d/t neck pain  LUE Strength  Gross LUE Strength: WFL  RUE Strength  Gross RUE Strength: WFL                Plan   Plan  Times per week: 2-3  Current Treatment Recommendations: Strengthening,Positioning,Patient/Caregiver Education & Training,Endurance Training,ROM,Pain Management,Balance Training,Safety Education & Training,Self-Care / ADL    AM-PAC Score        AM-[facility] Inpatient Daily Activity Raw Score: 19 (01/10/22 1417)  -PAC Inpatient ADL T-Scale Score : 40.22 (01/10/22 1417)  ADL Inpatient CMS 0-100% Score: 42.8 (01/10/22 1417)  ADL Inpatient CMS G-Code Modifier : CK (01/10/22 1417)    Goals  Short term goals  Time Frame for Short term goals: prior to d/c  Short term goal 1: toileting Mod I  Short term goal 2: LB dressing Mod I  Short term goal 3: UB bathing/dressing Mod I  Short term goal 4: Tolerate 5 min fxl standing task Mod I  Short term goal 5: BUE exercises in all planes (gentle R neck/shoulder) to improve ROM, strength and endurance for ADL and tx  Patient Goals   Patient goals : improve pain       Therapy Time   Individual Concurrent Group Co-treatment   Time In 0930         Time Out 1000         Minutes 30         Timed Code Treatment Minutes: 15 Minutes (15 eval. 15 TA)       Dayanna Vu, FEMI, OTR/L

## 2022-01-10 NOTE — CONSULTS
[facility] Vascular and Endovascular Surgery  Consultation Note    1/10/2022 12:29 PM     Chief Complaint: Chest Pain     Reason for Consultation: Left Second Toe Discoloration    History of Present Illness  Patient is a 73 y.o. female who presented to the ED on 11/9/2022 with complaints of Chest Pain. She has a significant PMH of A-fib, CAD, TIA, CHF, COPD, CKD 3, DM II, HLD, HTN, IBS, Cardiac Ablations, Left Carotid Endarterectomy, Coronary Stents, and Multiple Port Placements. The patient reports she has intermittent chest pain and has a long cardiac history. She is concerned about the discoloration to her Left Second Toe and reports it has been there for the past 1-2 months. She denies any traumatic events prior to this which would have lead to this discoloration. At present, the patient states her chest pain is mild; however, she states it \"comes and goes\". We have been consulted to evaluate the patient for her Left Second Toe Discoloration. At present, the patient appears to be in stable condition. Review of Systems  Review of Systems   Constitutional: Negative for chills and fever. Respiratory: Negative for shortness of breath. Cardiovascular: Positive for chest pain. Gastrointestinal: Positive for abdominal pain. Chronic - pt reports history of IBS   Genitourinary:        History of CKD - never been on HD   Musculoskeletal: Negative. Skin: Positive for color change. Negative for wound. Left Second Toe Discoloration   Neurological: Negative. Psychiatric/Behavioral: Negative for confusion.         Past Medical History:   Diagnosis Date    Acid reflux     Anemia     Anxiety and depression     Arthritis     Asthma     Atrial fibrillation (HCC)     CAD (coronary artery disease) 12/3/2012    Cerebral artery occlusion with cerebral infarction [facility])     TIA\"\"S--right sided weakness & headache    CHF (congestive heart failure) (HCC)     Chronic kidney disease--stage III     40% kidney function    COPD (chronic obstructive pulmonary disease) (HCC)     DM2 (diabetes mellitus, type 2) ([facility]     Dysarthria     Fibromyalgia 6/7/2016    Headache(784.0) 2/19/2013    Hemisensory loss     History of blood transfusion 11/2020    pt denies having transfusion reaction    Hyperlipidemia     Hypertension     IBS (irritable bowel syndrome)     Inferior vena cava occlusion (HCC)     Keratitis     MI, old     Neuropathy     Superior vena cava obstruction     Temporal arteritis ([facility] 2/24/2014    Wears glasses        Past Surgical History:   Procedure Laterality Date    ABLATION OF DYSRHYTHMIC FOCUS  1999  and 11/2020    times 2    ARTERY BIOPSY Right 04/23/2014    RIGHT TEMPORAL ARTERY BIOPSY    CAROTID ARTERY SURGERY Left     clean up per pt    CATARACT REMOVAL Bilateral     CHOLECYSTECTOMY      COLONOSCOPY N/A 4/9/2021    COLONOSCOPY WITH BIOPSY performed by Marielos Jiménez MD at [address] [facility] N/A 10/15/2021    COLONOSCOPY performed by Marielos Jiménez MD at [address] [place]    HYSTERECTOMY      JOINT REPLACEMENT Right     KNEE ARTHROSCOPY Right     PTCA  10/2019    LAD and RCA inrtervention    TUNNELED VENOUS PORT PLACEMENT      left thigh. SMART PORT-----Removed--total of 4 port placement and removal    UPPER GASTROINTESTINAL ENDOSCOPY N/A 7/6/2020    EGD DIAGNOSTIC ONLY performed by Temo Valderrama MD at [address] [facility]       No Known Allergies    Social History     Socioeconomic History    Marital status:       Spouse name: Not on file    Number of children: 8    Years of education: Not on file    Highest education level: Not on file   Occupational History    Not on file   Tobacco Use    Smoking status: Former Smoker     Packs/day: 0.50     Years: 35.00     Pack years: 17.50     Types: Cigarettes     Quit date: 7/1/2018     Years since quitting: 3.5    Smokeless tobacco: Never Used    Tobacco comment: 5/13/15 has not smoked since (37.1 °C)  97.4 °F (36.3 °C)   TempSrc:  Oral  Oral   SpO2:  98% 98% 98%   Weight: 109 lb 12.6 oz (49.8 kg)      Height:           Physical Exam:  General: no apparent distress, alert and oriented x3, afebrile  Psychiatric: affect appropriate, cooperative  Head/Eyes/Ears/Nose/Throat: normocephalic, atraumatic, face symmetric  Neck: no carotid bruit, supple, symmetrical, trachea midline  Chest/Lungs: clear to auscultation bilaterally, no accessory muscle use  Cardiac: regular rate and rhythm, bradycardic  Abdomen: soft, nontender, active bowel sounds  Extremities: warm and well perfused, no signs of cyanosis or ischemia, no significant edema, bilateral upper and lower extremity motorsensory intact, darkened discoloration to distal half of Left Second Toe with decreased sensation  Vascular exam:  -R Radial: +2 palp  -L Radial: +2 palp  -R DP: doppler signal present  -L DP: biphasic doppler signal present, +2 palp  -R PT: doppler signal present  -L PT: biphasic doppler signal present  -Left Second Toe Base: doppler signal present  Wounds:  -Left Second Toe - darkened discoloration    Labs  Lab Results   Component Value Date    WBC 3.9 01/10/2022    HGB 10.7 01/10/2022    HCT 32.5 01/10/2022    MCV 97.3 01/10/2022     01/10/2022     Lab Results   Component Value Date     01/10/2022    K 3.8 01/10/2022     01/10/2022    CO2 19 01/10/2022    PHOS 2.2 10/23/2021    BUN 26 01/10/2022    CREATININE 1.7 01/10/2022      No results found for: GLU    Scheduled Meds:    [START ON 1/11/2022] enoxaparin  30 mg SubCUTAneous Daily    sodium chloride flush  10 mL IntraVENous 2 times per day    amLODIPine  5 mg Oral BID    aspirin  81 mg Oral Daily    atorvastatin  80 mg Oral Nightly    clopidogrel  75 mg Oral Daily    dicyclomine  20 mg Oral 4x Daily AC & HS    DULoxetine  60 mg Oral Daily    insulin glargine  8 Units SubCUTAneous BID    isosorbide mononitrate  30 mg Oral Daily    linaclotide  145 mcg Oral QAM AC metoprolol succinate  50 mg Oral Nightly    pantoprazole  40 mg Oral QAM AC    QUEtiapine  25 mg Oral Nightly    ranolazine  1,000 mg Oral BID    [Held by provider] torsemide  10 mg Oral Daily    insulin lispro  0-12 Units SubCUTAneous TID WC    insulin lispro  0-6 Units SubCUTAneous Nightly     Continuous Infusions:    sodium chloride      dextrose      dextrose         Imaging:   Bilateral Lower Extremity Arterial Duplex - Pending    Assessment:  -Left Second Toe discoloration - pt reports presence for 1-2 months  -Palpable Left DP, good doppler signals to bilateral DP and PT  -Doppler signal present to base of Second Toe    Plan:   -Arterial Duplex ordered by Primary Team  -Echo to rule out possible embolic source for darkened Left Second Toe    All pertinent information and plan of care discussed with Dr. Kim Deleon. All questions and concerns were addressed with the patient. I have discussed the above stated plan with patient and the nurse. The patient verbalized understanding and agreed with the plan. Thank you for allowing to us to participate in the care of [address]      Electronically signed by CYRUS Qiu - CNP on 1/10/2022 at 12:29 PM  Pt seen and examined. for DIAGNOSIS OF toe pain and discoloration agree with CYRUS Qiu's note. Labs reviewed. Vitals   Vitals:    01/10/22 0816 01/10/22 1113 01/10/22 1455 01/10/22 1627   BP:  (!) 103/57  121/60   Pulse:  52  (!) 49   Resp: 16 14  16   Temp:  97.4 °F (36.3 °C)  97.3 °F (36.3 °C)   TempSrc:  Oral  Oral   SpO2: 98% 98%  97%   Weight:       Height:   5' (1.524 m)        Palpable dorsalis pedis pulse Doppler signal heard in second toe but does look abnormal arterial duplex and echo of the heart ordered to look for source of emboli

## 2022-01-10 NOTE — PROGRESS NOTES
Patient resting quietly in bed. Alert and oriented. Continues to have pressure in chest. VSS. Morning medications given without difficulty. Breakfast eaten. Bed alarm on. Call light and belongings within reach. Patient able to make needs known. Will continue to monitor.      Electronically signed by Renetta Montana RN on 1/10/2022 at 12:42 PM

## 2022-01-10 NOTE — PROGRESS NOTES
Patient has not voided this shift. Bladder scan per order for 153 ml. Secure message sent to Dr. Juan Carlos Rooney.

## 2022-01-10 NOTE — PLAN OF CARE
Nutrition Problem #1: Moderate malnutrition  Intervention: Food and/or Nutrient Delivery: Continue Current Diet,Start Oral Nutrition Supplement  Nutritional Goals: consume >/= to 50 %

## 2022-01-10 NOTE — PROGRESS NOTES
Patient A&O. Tolerating PO. Call light within reach. Will continue to monitor and reassess.  Electronically signed by Ian Diego RN on 1/9/2022

## 2022-01-10 NOTE — PLAN OF CARE
Problem: Falls - Risk of:  Goal: Will remain free from falls  Description: Will remain free from falls  1/10/2022 1239 by Mira Rodriguez RN  Outcome: Ongoing  Note: Fall risk assessment completed. Fall precautions in place. Call light within reach. Pt educated on calling for assistance before getting up. Walkway free of clutter. Will continue to monitor. Electronically signed by Mira Rodriguez RN on 1/10/2022 at 12:39 PM      Problem: Pain:  Goal: Pain level will decrease  Description: Pain level will decrease  1/10/2022 1239 by Mira Rodriguez RN  Outcome: Ongoing  Note: Educated patient on pain management. Will assess patients pain level per unit protocol, and provide pain management measures as needed.    Electronically signed by Mira Rodriguez RN on 1/10/2022 at 12:39 PM

## 2022-01-10 NOTE — PROGRESS NOTES
New orders for normal saline infusion at 75 ml/hr per MD. Fluids infusing at this time. Will continue to monitor.      Electronically signed by Kin Handy RN on 1/10/2022 at 6:45 PM

## 2022-01-10 NOTE — PROGRESS NOTES
Physical Therapy    Facility/Department: [facility] 3 ORTHOPEDICS  Initial Assessment  This note serves as patient discharge summary if pt discharges prior to next PT visit      NAME: Lenny Srivastava  : 1955  MRN: [id number]    Date of Service: 1/10/2022    Discharge Recommendations:  Continue to assess pending progress,Home with assist PRN   Lenny Srivastava scored a 22/24 on the AM-PAC short mobility form. PT Equipment Recommendations  Other: cont to assess. GEETA RW? Rollator? Assessment   Body structures, Functions, Activity limitations: Decreased functional mobility ; Increased pain;Decreased strength  Assessment: per H and P:  \"Patient is a 78-year-old female with past medical history of atrial fibrillation CAD, COPD diabetes mellitus who presents to the hospital for chest pain. According to patient she has chest pain, substernal, 7/10 intensity, which is started while she was sleeping at 7 AM in the morning, mentions it radiates from her left shoulder, associated with some shortness of breath, 7/10 intensity. Mentions she had similar episodes of chest pains in the past when she had to get stents placed mentions she has 6-7 stents. \"  Caridology consulted. PMHx also includes R TIA with weakness, CKD lll, fibromyalgia. Prior Status:  Lives alone in senior apt with level entry and elevator. Independent in transfers and apartment mobility with cane as needed. Has home aide 5 hours/day, 5 days a week for ADLs, IADLs. [employer]. Status 1-:  Bed mobility Supervision, Transfers SBA/Cues. Gait ' with close SBA. Patinet reporting posterior cervical spine pain and fatigue during gait. Gait Tolerance Good-, Gait quality Good. Anticipate progression of function to point that returning to home with assist as prior will be realistic. She could potentially benefit from a RW. Will assess at next session. Treatment Diagnosis: Impaired activity tolerance.   Prognosis: Good  Decision Making: Medium Complexity  History: as noted  Clinical Presentation: Evolving  PT Education: Goals;PT Role;Plan of Care;Transfer Training;Equipment; Functional Mobility Training  Patient Education: supporting B UEs to decrease stress to cervical spine. B Ankle DF stretch. REQUIRES PT FOLLOW UP: Yes  Activity Tolerance  Activity Tolerance: Patient Tolerated treatment well       Patient Diagnosis(es): The primary encounter diagnosis was Chest pain, unspecified type. A diagnosis of History of coronary artery disease was also pertinent to this visit. has a past medical history of Acid reflux, Anemia, Anxiety and depression, Arthritis, Asthma, Atrial fibrillation ([address] CAD (coronary artery disease), Cerebral artery occlusion with cerebral infarction ([address] CHF (congestive heart failure) ([address] Chronic kidney disease--stage III, COPD (chronic obstructive pulmonary disease) ([address] DM2 (diabetes mellitus, type 2) ([address] Dysarthria, Fibromyalgia, Headache(784.0), Hemisensory loss, History of blood transfusion, Hyperlipidemia, Hypertension, IBS (irritable bowel syndrome), Inferior vena cava occlusion ([address] Keratitis, MI, old, Neuropathy, Superior vena cava obstruction, Temporal arteritis ([address] and Wears glasses. has a past surgical history that includes Tunneled venous port placement; Cholecystectomy; ablation of dysrhythmic focus (1999  and 11/2020); Cataract removal (Bilateral); joint replacement (Right); Hysterectomy; Artery Biopsy (Right, 04/23/2014); Coronary angioplasty with stent (2020); Knee arthroscopy (Right); Percutaneous Transluminal Coronary Angio (10/2019); Upper gastrointestinal endoscopy (N/A, 7/6/2020); Carotid artery surgery (Left); Colonoscopy (N/A, 4/9/2021); and Colonoscopy (N/A, 10/15/2021).     Restrictions  Restrictions/Precautions  Restrictions/Precautions: Fall Risk     Vision/Hearing  Vision: Impaired  Vision Exceptions: Wears glasses at all times  Hearing: Within functional limits       Subjective  General  Chart Reviewed: Yes  Patient assessed for rehabilitation services?: Yes  Additional Pertinent Hx: per H and P:  \"Patient is a 50-year-old female with past medical history of atrial fibrillation CAD, COPD diabetes mellitus who presents to the hospital for chest pain. According to patient she has chest pain, substernal, 7/10 intensity, which is started while she was sleeping at 7 AM in the morning, mentions it radiates from her left shoulder, associated with some shortness of breath, 7/10 intensity. Mentions she had similar episodes of chest pains in the past when she had to get stents placed mentions she has 6-7 stents. \"  Caridology consulted. PMHx also includes R TIA with weakness, CKD lll, fibromyalgia. Response To Previous Treatment: Not applicable  Family / Caregiver Present: No  Referring Practitioner: Nighat Davey MD  Referral Date : 01/09/22  Subjective  Subjective: 8/10 central cervial posterior pain. Numbness of all fiingertips B hands. Also numbness B feet  Pain Screening  Patient Currently in Pain: Yes      Orientation  WNL    Social/Functional History  Social/Functional History  Lives With: Alone  Type of Home: Apartment (3rd floor)  Home Layout: One level  Home Access: Level entry,Elevator  Bathroom Shower/Tub: Walk-in shower  Bathroom Toilet: Standard  Bathroom Equipment: Grab bars in shower,Built-in shower seat,Grab bars around toilet  Bathroom Accessibility: Walker accessible  Home Equipment: Cane,Lift chair,Alert Button  ADL Assistance: Needs assistance (aide assists as needed)  Homemaking Assistance: Needs assistance (meals on wheels 5x/week, aide assist with laundry and cleaning)  Ambulation Assistance: Independent (with cane as needed inside. Always in community)  Transfer Assistance: Independent  Active : No  Occupation: On disability  Leisure & Hobbies: Listen to music.   IADL Comments: HHA 5 hours/day, 5 days per week:  cooking, laundry, grocery, cleaning. Meals on Wheels  Additional Comments: sleeps in flat bed     Cognition   Cognition  Overall Cognitive Status: WFL    Objective  AROM RLE (degrees)  RLE AROM: WFL  RLE General AROM: except knee ROM 10- 100. AROM LLE (degrees)  LLE AROM : WFL  Strength RLE  Comment: Grossly 3+/5  Strength LLE  Comment: 4+/5  Sensation  Overall Sensation Status:  (reports numbness B feet, and fingertip numbness B hands.)  Bed mobility  Supine to Sit: Supervision (bed flat, no rails)  Sit to Supine: Unable to assess (in BS chair at end of session)  Scooting: Supervision  Transfers  Sit to Stand: Stand by assistance (cues for hand placement)  Stand to sit: Stand by assistance (cues for hand placement)  Ambulation  Ambulation?: Yes  Ambulation 1  Surface: level tile  Device: Rolling Walker  Assistance: Stand by assistance (close SBA)  Quality of Gait: Step through pattern. Generally good use of RW, except cues to use RW until fully aligned to sitting surface. 1 minor LOB coming out of bathroom, with SBA to recover. Reports neck tightness during.   Gait Deviations: Slow Marianela  Distance: 100'  Balance  Posture: Good  Sitting - Static: Good  Sitting - Dynamic: Good  Standing - Static: Good  Standing - Dynamic: Good;- (at RW)    Plan   Plan  Times per week: 3-5  Current Treatment Recommendations: Functional Mobility Training,Transfer Training,Positioning  Safety Devices  Type of devices: Call light within reach,Gait belt,Left in chair,Nurse notified (RN [place] informed)    AM-PAC Score  AM-PAC Inpatient Mobility Raw Score : 22 (01/10/22 1015)  AM-PAC Inpatient T-Scale Score : 53.28 (01/10/22 1015)  Mobility Inpatient CMS 0-100% Score: 20.91 (01/10/22 1015)  Mobility Inpatient CMS G-Code Modifier : Abraham Escobar (01/10/22 1015)     Goals  Short term goals  Time Frame for Short term goals: By acute DC  Short term goal 1: Bed Mobility Indep  Short term goal 2: Transfers Supervision  Short term goal 3: Gait with LRAD 100' Supervision  Patient Goals   Patient goals : \"To feel better. \"       Therapy Time   Individual Concurrent Group Co-treatment   Time In 0912         Time Out 1005         Minutes 48            EV 15; Gt 15; TA 21    Rosalie Hector PT  Electronically signed by Ced Bean, [address] [place] [address] Drive [phone])  on 1/10/2022 at 6:15 PM

## 2022-01-10 NOTE — PROGRESS NOTES
Notified by lab that they were unable to draw patient's troponin as they could not get access. Stated they will try again shortly after shift change.

## 2022-01-10 NOTE — PROGRESS NOTES
Comprehensive Nutrition Assessment    Type and Reason for Visit:  Initial,Positive Nutrition Screen    Nutrition Recommendations/Plan:   Add Punch flavored Thony bid to start    Nutrition Assessment:  Pt triggered a positive screen for wt loss. PMH includes CKD (3), IBS, HTN, HLD, Fibromyalgia, DM, COPD. Pt adm r/t chest pain. Diet adv to Tran 66 (4) / low phosphorus, low potassium. Pt reported and records confirm good po intake at %. Discussed rationale and basics of diet order. Comprehended per feedback. Pt with wt loss trend but not at significant rate. Pt reported that UBW is 115-120 lbs. Feedback revealed that pt has been experiencing episodes of nausea and is often afraid to eat for fear of vomiting. Agreed to adding punch flavored Ensure Clear bid to start. Encouraged pt to speak with MD about issues with nausea. Pt agreed to comply. Malnutrition Assessment:  Malnutrition Status: Moderate malnutrition    Context:  Chronic Illness     Findings of the 6 clinical characteristics of malnutrition:  Energy Intake:  7 - 75% or less estimated energy requirements for 1 month or longer  Weight Loss:  No significant weight loss     Body Fat Loss:  No significant body fat loss     Muscle Mass Loss:  1 - Mild muscle mass loss Temples (temporalis)  Fluid Accumulation:  No significant fluid accumulation     Strength:  Not Performed    Estimated Daily Nutrient Needs:  Energy (kcal):  2882-0576 (25-30 x ABW 50 kg); Weight Used for Energy Requirements:        Protein (g):  40-60 (.8-1.2 x ABW 50 kg (adj for CKD); Weight Used for Protein Requirements:           Fluid (ml/day):   ; Method Used for Fluid Requirements:  1 ml/kcal      Nutrition Related Findings:  Noted BM on 1/8. Noted A1C 7.8 (1/9/22). Noted K+ level acceptable. Noted no phosphorus level. Noted no edema. Wounds:   (Pt noted with a bluish discoloration to left 2nd toe.  Work up in progress.)       Current Nutrition Therapies:    ADULT DIET;

## 2022-01-10 NOTE — PROGRESS NOTES
Hospitalist Progress Note      PCP: Annetta Pratt MD    Date of Admission: 1/9/2022        Subjective: No chest pain at this time, no abdominal pain nausea or vomiting.  She is complaining of chronic bluish discoloration of her left second toe      Medications:  Reviewed    Infusion Medications    sodium chloride      dextrose      dextrose       Scheduled Medications    sodium chloride flush  10 mL IntraVENous 2 times per day    enoxaparin  40 mg SubCUTAneous Daily    insulin lispro  0-12 Units SubCUTAneous TID WC    insulin lispro  0-6 Units SubCUTAneous Nightly    amLODIPine  5 mg Oral BID    aspirin  81 mg Oral Daily    atorvastatin  80 mg Oral Nightly    clopidogrel  75 mg Oral Daily    dicyclomine  20 mg Oral 4x Daily AC & HS    DULoxetine  60 mg Oral Daily    insulin glargine  8 Units SubCUTAneous BID    isosorbide mononitrate  30 mg Oral Daily    linaclotide  145 mcg Oral QAM AC    metoprolol succinate  50 mg Oral Nightly    pantoprazole  40 mg Oral QAM AC    QUEtiapine  25 mg Oral Nightly    ranolazine  1,000 mg Oral BID    torsemide  10 mg Oral Daily    insulin lispro  0-12 Units SubCUTAneous TID WC    insulin lispro  0-6 Units SubCUTAneous Nightly     PRN Meds: GI cocktail, sodium chloride flush, sodium chloride, potassium chloride **OR** potassium alternative oral replacement **OR** potassium chloride, potassium chloride, magnesium sulfate, promethazine **OR** ondansetron, magnesium hydroxide, acetaminophen **OR** acetaminophen, glucose, dextrose, glucagon (rDNA), dextrose, albuterol, oxyCODONE-acetaminophen, tiZANidine, glucose, dextrose, glucagon (rDNA), dextrose, morphine    No intake or output data in the 24 hours ending 01/10/22 0827    Physical Exam Performed:    /78   Pulse 50   Temp 98.7 °F (37.1 °C) (Oral)   Resp 16   Ht 5' (1.524 m)   Wt 109 lb 12.6 oz (49.8 kg)   SpO2 98%   BMI 21.44 kg/m²     General appearance: No apparent distress  Neck: Supple  Respiratory:  Normal respiratory effort. Clear to auscultation, bilaterally without Rales/Wheezes/Rhonchi. Cardiovascular: Regular rate and rhythm with normal S1/S2 without murmurs, rubs or gallops. Abdomen: Soft, non-tender, non-distended  Musculoskeletal: No clubbing, cyanosis  Skin: Discoloration of left second toe, partial, patient stated chronic  Neurologic:  No focal weakness   Psychiatric: Alert and oriented  Capillary Refill: Brisk,3 seconds, normal   Peripheral Pulses: +2 palpable, equal bilaterally       Labs:   Recent Labs     01/09/22  1233 01/10/22  0427   WBC 4.9 3.9*   HGB 11.9* 10.7*   HCT 35.6* 32.5*    206     Recent Labs     01/09/22  1233 01/10/22  0427    139   K 4.2 3.8    108   CO2 20* 19*   BUN 22* 26*   CREATININE 1.2 1.7*   CALCIUM 9.7 9.0     Recent Labs     01/09/22  1233   AST 23   ALT 13   BILITOT 0.4   ALKPHOS 120     No results for input(s): INR in the last 72 hours. Recent Labs     01/09/22  1233 01/09/22  2156 01/10/22  0427   TROPONINI <0.01 <0.01 <0.01       Urinalysis:      Lab Results   Component Value Date    NITRU Negative 11/26/2021    WBCUA 15 11/26/2021    BACTERIA 1+ 11/26/2021    RBCUA 3 11/26/2021    BLOODU TRACE-INTACT 11/26/2021    SPECGRAV 1.025 11/26/2021    GLUCOSEU Negative 11/26/2021    GLUCOSEU NEGATIVE 05/14/2012       Radiology:  XR CHEST (2 VW)   Final Result   1. No acute abnormality. Assessment/Plan:    Active Hospital Problems    Diagnosis     Chest pain [R07.9]      1. Chest pain, monitor troponin, telemetry monitoring, cardiology consulted on admission. On aspirin and Plavix  2. Diabetes mellitus, sliding scale  3. COPD, no acute exacerbation  4. A. fib, controlled  5. Acute kidney injury, monitor closely if further increase consider consulting nephrology. 6.  Anemia, appears chronic, follow-up with PCP for further work-up  7.  Peripheral vascular disease with discoloration of second left toe, vascular surgery consulted        Diet: ADULT DIET; Regular; 4 carb choices (60 gm/meal); Low Potassium (Less than 3000 mg/day);  Low Phosphorus (Less than 1000 mg)  Code Status: Full Code      Janee Fermin MD

## 2022-01-10 NOTE — PROGRESS NOTES
Patient states she has not been able to void for a day. Bladder scanned for 198mls. Anirudh Lara NP notified as PRN straight cath order does not specify at what volume to straight cath. Okay to straight cath patient for anything above 500mls per Dg Pozo NP. Patient also complaining of continuing chest discomfort. EKG ordered, Dg Pozo NP notified.

## 2022-01-10 NOTE — CONSULTS
[address]  Cardiology Consult    Shira Mathur  1955    January 10, 2022        CC: CP      Subjective:     History of Present Illness:    Shira Mathur is a 73 y.o. patient with a PMH significant for CAD, PAD, AFIB presented with complaints of CP. Patient complains of chest pain. Onset was 2 days ago, with worsening course since that time. The patient describes the pain as intermittent, precordial in nature, does not radiate. Patient rates pain as a 4/10 in intensity. Associated symptoms are chest pain. Aggravating factors are exercise. Alleviating factors are: none. Patient's cardiac risk factors are none. Patient's risk factors for DVT/PE: none. Previous cardiac testing: [facility]. Past Medical History:   has a past medical history of Acid reflux, Anemia, Anxiety and depression, Arthritis, Asthma, Atrial fibrillation ([address] CAD (coronary artery disease), Cerebral artery occlusion with cerebral infarction ([address] CHF (congestive heart failure) ([address] Chronic kidney disease--stage III, COPD (chronic obstructive pulmonary disease) ([address] DM2 (diabetes mellitus, type 2) ([address] Dysarthria, Fibromyalgia, Headache(784.0), Hemisensory loss, History of blood transfusion, Hyperlipidemia, Hypertension, IBS (irritable bowel syndrome), Inferior vena cava occlusion ([address] Keratitis, MI, old, Neuropathy, Superior vena cava obstruction, Temporal arteritis ([address] and Wears glasses. Surgical History:   has a past surgical history that includes Tunneled venous port placement; Cholecystectomy; ablation of dysrhythmic focus (1999  and 11/2020); Cataract removal (Bilateral); joint replacement (Right); Hysterectomy; Artery Biopsy (Right, 04/23/2014); Coronary angioplasty with stent (2020); Knee arthroscopy (Right); Percutaneous Transluminal Coronary Angio (10/2019); Upper gastrointestinal endoscopy (N/A, 7/6/2020); Carotid artery surgery (Left);  Colonoscopy (N/A, 4/9/2021); and Colonoscopy (N/A, 10/15/2021). Social History:   reports that she quit smoking about 3 years ago. Her smoking use included cigarettes. She has a 17.50 pack-year smoking history. She has never used smokeless tobacco. She reports that she does not drink alcohol and does not use drugs. Family History:  family history includes Cancer in her mother; Diabetes in her mother; High Blood Pressure in her mother; High Cholesterol in her mother; No Known Problems in her paternal grandfather; Stroke in her mother. Home Medications:  Were reviewed and are listed in nursing record and/or below  Prior to Admission medications    Medication Sig Start Date End Date Taking? Authorizing Provider   amLODIPine (NORVASC) 2.5 MG tablet Take 2.5 mg by mouth daily   Yes Historical Provider, MD   isosorbide mononitrate (IMDUR) 60 MG extended release tablet Take 60 mg by mouth daily 12/7/21  Yes Historical Provider, MD   diclofenac sodium (VOLTAREN) 1 % GEL Apply 2 g topically 4 times daily as needed Apply to left knee 11/29/21  Yes Historical Provider, MD   docusate sodium (COLACE) 100 MG capsule Take 100 mg by mouth 2 times daily 11/29/21  Yes Historical Provider, MD   metoclopramide (REGLAN) 10 MG tablet Take 10 mg by mouth 4 times daily (before meals and nightly) 11/29/21  Yes Historical Provider, MD   Erenumab-aooe (AIMOVIG) 70 MG/ML SOAJ INJECT 140 MLS INTO THE SKIN EVERY 30 DAYS 12/15/21  Yes Fredi Orozco MD   oxyCODONE-acetaminophen (PERCOCET) 7.5-325 MG per tablet Take 1 tablet by mouth every 6 hours as needed for Pain for up to 35 days.  12/7/21 1/11/22 Yes Fredi Orozco MD   tiZANidine (ZANAFLEX) 4 MG tablet Take 0.5-1 tablets by mouth 2 times daily as needed (muscle spasms) 12/7/21  Yes Fredi Orozco MD   DULoxetine (CYMBALTA) 60 MG extended release capsule Take 1 capsule by mouth daily 12/7/21  Yes Fredi Orozco MD   Ubrogepant (UBRELVY) 50 MG TABS Take 1 tablet po PRN at start of headaches, can repeat in 24 hours 12/7/21  Yes Rossy Mckenna MD Danica   QUEtiapine (SEROQUEL) 25 MG tablet Take 1 tablet by mouth nightly 12/7/21  Yes Seema Carbajal MD   atorvastatin (LIPITOR) 80 MG tablet TAKE 1 TABLET BY MOUTH NIGHTLY 11/2/21  Yes Saad Parker MD   torsemide (DEMADEX) 10 MG tablet TAKE 1 TABLET BY MOUTH DAILY 11/2/21  Yes Saad Parker MD   clopidogrel (PLAVIX) 75 MG tablet TAKE ONE TABLET BY MOUTH EVERY DAY 11/2/21  Yes Saad Parker MD   ranolazine (RANEXA) 1000 MG extended release tablet Take 1 tablet by mouth 2 times daily 10/29/21  Yes Ivette Sims MD   linaclotide [facility]) 145 MCG capsule Take 1 capsule by mouth every morning (before breakfast) 10/15/21  Yes Oneil Sanchez MD   insulin aspart (NOVOLOG FLEXPEN) 100 UNIT/ML injection pen Inject 3 Units into the skin 3 times daily (before meals) 7/20/21  Yes Saad Parker MD   insulin glargine (BASAGLAR KWIKPEN) 100 UNIT/ML injection pen Inject 8 Units into the skin 2 times daily 7/20/21  Yes Saad Parker MD   aspirin 81 MG chewable tablet Take 1 tablet by mouth daily 12/24/20  Yes Sulema Cleaning MD   metoprolol succinate (TOPROL XL) 50 MG extended release tablet Take 50 mg by mouth nightly    Historical Provider, MD   ULTICARE MINI PEN NEEDLES 31G X 6 MM MISC USE WITH INSULINS FOUR TIMES A DAY 7/19/21   Saad Parker MD   alirocumab (PRALUENT) 75 MG/ML SOAJ injection pen Inject 1 mL into the skin every 14 days 3/29/21   CYRUS Avelar - CNP   omeprazole (PRILOSEC) 20 MG delayed release capsule TAKE 1 CAPSULE BY MOUTH DAILY 3/5/21   Saad Parker MD   nitroGLYCERIN (NITROSTAT) 0.4 MG SL tablet Place 1 tablet under the tongue every 5 minutes as needed for Chest pain up to max of 3 total doses.  If no relief after 1 dose, call 911. 12/16/20   Saad Praker MD   albuterol (PROVENTIL) (2.5 MG/3ML) 0.083% nebulizer solution Take 3 mLs by nebulization every 4 hours as needed for Wheezing 3/25/20   Saad Parker MD   albuterol sulfate HFA (VENTOLIN HFA) 108 (90 Base) MCG/ACT inhaler Inhale 2 puffs into the lungs every 4 hours as needed for Wheezing or Shortness of Breath 12/6/19   Tiffanie Belle MD        CURRENT Medications:  aluminum & magnesium hydroxide-simethicone (MAALOX) 30 mL, lidocaine viscous hcl (XYLOCAINE) 5 mL (GI COCKTAIL), Once PRN  [START ON 1/11/2022] enoxaparin (LOVENOX) injection 30 mg, Daily  sodium chloride flush 0.9 % injection 10 mL, 2 times per day  sodium chloride flush 0.9 % injection 10 mL, PRN  0.9 % sodium chloride infusion, PRN  promethazine (PHENERGAN) tablet 12.5 mg, Q6H PRN   Or  ondansetron (ZOFRAN) injection 4 mg, Q6H PRN  acetaminophen (TYLENOL) tablet 650 mg, Q6H PRN   Or  acetaminophen (TYLENOL) suppository 650 mg, Q6H PRN  glucose (GLUTOSE) 40 % oral gel 15 g, PRN  dextrose 50 % IV solution, PRN  glucagon (rDNA) injection 1 mg, PRN  dextrose 5 % solution, PRN  albuterol (PROVENTIL) nebulizer solution 2.5 mg, Q4H PRN  amLODIPine (NORVASC) tablet 5 mg, BID  aspirin chewable tablet 81 mg, Daily  atorvastatin (LIPITOR) tablet 80 mg, Nightly  clopidogrel (PLAVIX) tablet 75 mg, Daily  dicyclomine (BENTYL) tablet 20 mg, 4x Daily AC & HS  DULoxetine (CYMBALTA) extended release capsule 60 mg, Daily  insulin glargine (LANTUS) injection vial 8 Units, BID  isosorbide mononitrate (IMDUR) extended release tablet 30 mg, Daily  linaclotide (LINZESS) capsule 145 mcg, QAM AC  metoprolol succinate (TOPROL XL) extended release tablet 50 mg, Nightly  pantoprazole (PROTONIX) tablet 40 mg, QAM AC  oxyCODONE-acetaminophen (PERCOCET) 7.5-325 MG per tablet 1 tablet, Q6H PRN  QUEtiapine (SEROQUEL) tablet 25 mg, Nightly  ranolazine (RANEXA) extended release tablet 1,000 mg, BID  tiZANidine (ZANAFLEX) tablet 2 mg, BID PRN  [Held by provider] torsemide (DEMADEX) tablet 10 mg, Daily  glucose (GLUTOSE) 40 % oral gel 15 g, PRN  dextrose 50 % IV solution, PRN  glucagon (rDNA) injection 1 mg, PRN  dextrose 5 % solution, PRN  insulin lispro (HUMALOG) injection vial 0-12 Units, TID WC  insulin lispro (HUMALOG) injection vial 0-6 Units, Nightly  morphine (PF) injection 1 mg, Q4H PRN        Allergies:  Patient has no known allergies. Review of Systems: SEE HPI   · Constitutional: no unanticipated weight loss. There's been no change in energy level, sleep pattern, or activity level. No fevers, chills. · Eyes: No visual changes or diplopia. No scleral icterus. · ENT: No Headaches, hearing loss or vertigo. No mouth sores or sore throat. · Cardiovascular:  Chest pain, tightness or discomfort.  No Shortness of breath. No Dyspnea on exertion, Orthopnea, Paroxysmal nocturnal dyspnea or breathlessness at rest.   No Palpitations.  No Syncope ('blackouts', 'faints', 'collapse') or dizziness. · Respiratory: No cough or wheezing, no sputum production. No hematemesis. · Gastrointestinal: No abdominal pain, appetite loss, blood in stools. No change in bowel or bladder habits. · Genitourinary: No dysuria, trouble voiding, or hematuria. · Musculoskeletal:  No gait disturbance, no joint complaints. · Integumentary: No rash or pruritis. · Neurological: No headache, diplopia, change in muscle strength, numbness or tingling. · Psychiatric: No anxiety or depression. · Endocrine: No temperature intolerance. No excessive thirst, fluid intake, or urination. No tremor. · Hematologic/Lymphatic: No abnormal bruising or bleeding, blood clots or swollen lymph nodes. · Allergic/Immunologic: No nasal congestion or hives. Objective:     PHYSICAL EXAM:      Vitals:    01/10/22 1627   BP: 121/60   Pulse: (!) 49   Resp: 16   Temp: 97.3 °F (36.3 °C)   SpO2: 97%    Weight: 109 lb 12.6 oz (49.8 kg)       General Appearance:  Alert, cooperative, no distress, appears stated age. Head:  Normocephalic, without obvious abnormality, atraumatic. Eyes:  Pupils equal and round. No scleral icterus. Mouth: Moist mucosa, no pharyngeal erythema.    Nose: Nares normal. No drainage or sinus tenderness. Neck: Supple, symmetrical, trachea midline. No adenopathy. No tenderness/mass/nodules. No carotid bruit or elevated JVD. Lungs:   Respiratory Effort: Normal   Auscultation: Clear to auscultation bilaterally, respirations unlabored. No wheeze, rales   Chest Wall:  No tenderness or deformity. Cardiovascular:    Pulses  Palpation: normal   Ascultation: Regular rate, S1/ S2 normal. No murmur, rub, or gallop. 2+ radial and pedal pulses, symmetric  Carotid  Femoral   Abdomen and Gastrointestinal:   Soft, non-tender, bowel sounds active. Liver and Spleen  Masses   Musculoskeletal: No muscle wasting  Back  Gait   Extremities: Extremities normal, atraumatic. No cyanosis or edema. No cyanosis clubbing       Skin: Inspection and palpation performed, no rashes or lesions. Pysch: Normal mood and affect.  Alert and oriented to time place person   Neurologic: Normal gross motor and sensory exam.       Labs     All labs have been reviewed    Lab Results   Component Value Date    WBC 3.9 01/10/2022    RBC 3.34 01/10/2022    HGB 10.7 01/10/2022    HCT 32.5 01/10/2022    MCV 97.3 01/10/2022    RDW 14.5 01/10/2022     01/10/2022     Lab Results   Component Value Date     01/10/2022    K 3.8 01/10/2022     01/10/2022    CO2 19 01/10/2022    BUN 26 01/10/2022    CREATININE 1.7 01/10/2022    GFRAA 36 01/10/2022    GFRAA 60 05/23/2013    AGRATIO 0.9 01/09/2022    LABGLOM 30 01/10/2022    GLUCOSE 140 01/10/2022    PROT 8.1 01/09/2022    PROT 8.1 02/15/2013    CALCIUM 9.0 01/10/2022    BILITOT 0.4 01/09/2022    ALKPHOS 120 01/09/2022    AST 23 01/09/2022    ALT 13 01/09/2022     No results found for: PTINR  Lab Results   Component Value Date    LABA1C 7.8 01/09/2022     Lab Results   Component Value Date    CKTOTAL 41 08/22/2019    CKMB 2.3 10/06/2013    CKMBINDEX 0.9 04/01/2010    TROPONINI <0.01 01/10/2022       Cardiac, Vascular and Imaging Data all Personally Reviewed in Detail by Myself All other reviewed     Assessment and Plan     -PAD: Will get arterial Doppler    -Chest pain precordia: chronic non cardiac. No further cardiac work up. Thank you for allowing us to participate in the care of Ezequiel Alvarez. Please do not hesitate to contact me if you have any questions.     Michael Nolen MD, MPH    [address], [address] [place] [address]   Jemal [place]don [place] [address]  Ph: [phone]  Fax: [phone]    1/10/2022 5:21 PM

## 2022-01-11 ENCOUNTER — APPOINTMENT (OUTPATIENT)
Dept: ULTRASOUND IMAGING | Age: 67
DRG: 313 | End: 2022-01-11
Payer: COMMERCIAL

## 2022-01-11 LAB
ANION GAP SERPL CALCULATED.3IONS-SCNC: 16 MMOL/L (ref 3–16)
BACTERIA: ABNORMAL /HPF
BILIRUBIN URINE: NEGATIVE
BLOOD, URINE: NEGATIVE
BUN BLDV-MCNC: 40 MG/DL (ref 7–20)
CALCIUM SERPL-MCNC: 8.9 MG/DL (ref 8.3–10.6)
CHLORIDE BLD-SCNC: 99 MMOL/L (ref 99–110)
CLARITY: CLEAR
CO2: 17 MMOL/L (ref 21–32)
COLOR: ABNORMAL
CREAT SERPL-MCNC: 2.1 MG/DL (ref 0.6–1.2)
EPITHELIAL CELLS, UA: 3 /HPF (ref 0–5)
FERRITIN: 80.6 NG/ML (ref 15–150)
GFR AFRICAN AMERICAN: 29
GFR NON-AFRICAN AMERICAN: 24
GLUCOSE BLD-MCNC: 134 MG/DL (ref 70–99)
GLUCOSE BLD-MCNC: 146 MG/DL (ref 70–99)
GLUCOSE BLD-MCNC: 160 MG/DL (ref 70–99)
GLUCOSE BLD-MCNC: 165 MG/DL (ref 70–99)
GLUCOSE BLD-MCNC: 84 MG/DL (ref 70–99)
GLUCOSE URINE: NEGATIVE MG/DL
HCT VFR BLD CALC: 30.7 % (ref 36–48)
HEMOGLOBIN: 10.3 G/DL (ref 12–16)
HYALINE CASTS: 4 /LPF (ref 0–8)
IRON SATURATION: 17 % (ref 15–50)
IRON: 47 UG/DL (ref 37–145)
KETONES, URINE: NEGATIVE MG/DL
LEUKOCYTE ESTERASE, URINE: ABNORMAL
LV EF: 60 %
LVEF MODALITY: NORMAL
MCH RBC QN AUTO: 31.8 PG (ref 26–34)
MCHC RBC AUTO-ENTMCNC: 33.5 G/DL (ref 31–36)
MCV RBC AUTO: 94.8 FL (ref 80–100)
MICROSCOPIC EXAMINATION: YES
NITRITE, URINE: NEGATIVE
PDW BLD-RTO: 14.7 % (ref 12.4–15.4)
PERFORMED ON: ABNORMAL
PERFORMED ON: NORMAL
PH UA: 5.5 (ref 5–8)
PLATELET # BLD: 177 K/UL (ref 135–450)
PMV BLD AUTO: 8.5 FL (ref 5–10.5)
POTASSIUM REFLEX MAGNESIUM: 3.8 MMOL/L (ref 3.5–5.1)
PROTEIN UA: 30 MG/DL
RBC # BLD: 3.24 M/UL (ref 4–5.2)
RBC UA: 1 /HPF (ref 0–4)
SODIUM BLD-SCNC: 132 MMOL/L (ref 136–145)
SODIUM URINE: <20 MMOL/L
SPECIFIC GRAVITY UA: 1.02 (ref 1–1.03)
TOTAL IRON BINDING CAPACITY: 275 UG/DL (ref 260–445)
TRANSFERRIN: 224 MG/DL (ref 200–360)
UREA NITROGEN, UR: 606.8 MG/DL (ref 800–1666)
URINE TYPE: ABNORMAL
UROBILINOGEN, URINE: 0.2 E.U./DL
WBC # BLD: 4 K/UL (ref 4–11)
WBC UA: 5 /HPF (ref 0–5)

## 2022-01-11 PROCEDURE — 81001 URINALYSIS AUTO W/SCOPE: CPT

## 2022-01-11 PROCEDURE — 94761 N-INVAS EAR/PLS OXIMETRY MLT: CPT

## 2022-01-11 PROCEDURE — 2580000003 HC RX 258: Performed by: INTERNAL MEDICINE

## 2022-01-11 PROCEDURE — 82728 ASSAY OF FERRITIN: CPT

## 2022-01-11 PROCEDURE — 2500000003 HC RX 250 WO HCPCS: Performed by: INTERNAL MEDICINE

## 2022-01-11 PROCEDURE — 1200000000 HC SEMI PRIVATE

## 2022-01-11 PROCEDURE — 97116 GAIT TRAINING THERAPY: CPT

## 2022-01-11 PROCEDURE — 84300 ASSAY OF URINE SODIUM: CPT

## 2022-01-11 PROCEDURE — 84540 ASSAY OF URINE/UREA-N: CPT

## 2022-01-11 PROCEDURE — 83540 ASSAY OF IRON: CPT

## 2022-01-11 PROCEDURE — 51798 US URINE CAPACITY MEASURE: CPT

## 2022-01-11 PROCEDURE — 80048 BASIC METABOLIC PNL TOTAL CA: CPT

## 2022-01-11 PROCEDURE — 6370000000 HC RX 637 (ALT 250 FOR IP): Performed by: INTERNAL MEDICINE

## 2022-01-11 PROCEDURE — 6360000004 HC RX CONTRAST MEDICATION: Performed by: INTERNAL MEDICINE

## 2022-01-11 PROCEDURE — 6360000002 HC RX W HCPCS: Performed by: INTERNAL MEDICINE

## 2022-01-11 PROCEDURE — 36415 COLL VENOUS BLD VENIPUNCTURE: CPT

## 2022-01-11 PROCEDURE — APPNB30 APP NON BILLABLE TIME 0-30 MINS: Performed by: NURSE PRACTITIONER

## 2022-01-11 PROCEDURE — 85027 COMPLETE CBC AUTOMATED: CPT

## 2022-01-11 PROCEDURE — 84466 ASSAY OF TRANSFERRIN: CPT

## 2022-01-11 PROCEDURE — 76770 US EXAM ABDO BACK WALL COMP: CPT

## 2022-01-11 PROCEDURE — APPSS15 APP SPLIT SHARED TIME 0-15 MINUTES: Performed by: NURSE PRACTITIONER

## 2022-01-11 PROCEDURE — 97535 SELF CARE MNGMENT TRAINING: CPT

## 2022-01-11 PROCEDURE — 97530 THERAPEUTIC ACTIVITIES: CPT

## 2022-01-11 PROCEDURE — C8929 TTE W OR WO FOL WCON,DOPPLER: HCPCS

## 2022-01-11 PROCEDURE — 99233 SBSQ HOSP IP/OBS HIGH 50: CPT | Performed by: SURGERY

## 2022-01-11 RX ORDER — METOPROLOL SUCCINATE 25 MG/1
25 TABLET, EXTENDED RELEASE ORAL NIGHTLY
Status: DISCONTINUED | OUTPATIENT
Start: 2022-01-11 | End: 2022-01-13 | Stop reason: HOSPADM

## 2022-01-11 RX ORDER — AMLODIPINE BESYLATE 5 MG/1
5 TABLET ORAL DAILY
Status: DISCONTINUED | OUTPATIENT
Start: 2022-01-12 | End: 2022-01-13 | Stop reason: HOSPADM

## 2022-01-11 RX ADMIN — DICYCLOMINE HYDROCHLORIDE 20 MG: 20 TABLET ORAL at 18:02

## 2022-01-11 RX ADMIN — INSULIN LISPRO 2 UNITS: 100 INJECTION, SOLUTION INTRAVENOUS; SUBCUTANEOUS at 09:04

## 2022-01-11 RX ADMIN — INSULIN GLARGINE 8 UNITS: 100 INJECTION, SOLUTION SUBCUTANEOUS at 09:04

## 2022-01-11 RX ADMIN — DICYCLOMINE HYDROCHLORIDE 20 MG: 20 TABLET ORAL at 06:27

## 2022-01-11 RX ADMIN — METOPROLOL SUCCINATE 25 MG: 25 TABLET, EXTENDED RELEASE ORAL at 21:51

## 2022-01-11 RX ADMIN — OXYCODONE AND ACETAMINOPHEN 1 TABLET: 7.5; 325 TABLET ORAL at 14:54

## 2022-01-11 RX ADMIN — MORPHINE SULFATE 1 MG: 2 INJECTION, SOLUTION INTRAMUSCULAR; INTRAVENOUS at 12:01

## 2022-01-11 RX ADMIN — INSULIN LISPRO 6 UNITS: 100 INJECTION, SOLUTION INTRAVENOUS; SUBCUTANEOUS at 11:54

## 2022-01-11 RX ADMIN — AMLODIPINE BESYLATE 5 MG: 5 TABLET ORAL at 09:03

## 2022-01-11 RX ADMIN — ENOXAPARIN SODIUM 30 MG: 100 INJECTION SUBCUTANEOUS at 09:03

## 2022-01-11 RX ADMIN — CLOPIDOGREL BISULFATE 75 MG: 75 TABLET, FILM COATED ORAL at 09:03

## 2022-01-11 RX ADMIN — DULOXETINE HYDROCHLORIDE 60 MG: 60 CAPSULE, DELAYED RELEASE ORAL at 09:03

## 2022-01-11 RX ADMIN — OXYCODONE AND ACETAMINOPHEN 1 TABLET: 7.5; 325 TABLET ORAL at 21:51

## 2022-01-11 RX ADMIN — INSULIN GLARGINE 8 UNITS: 100 INJECTION, SOLUTION SUBCUTANEOUS at 21:53

## 2022-01-11 RX ADMIN — DICYCLOMINE HYDROCHLORIDE 20 MG: 20 TABLET ORAL at 21:51

## 2022-01-11 RX ADMIN — Medication 10 ML: at 09:05

## 2022-01-11 RX ADMIN — TIZANIDINE 2 MG: 4 TABLET ORAL at 21:53

## 2022-01-11 RX ADMIN — RANOLAZINE 1000 MG: 500 TABLET, FILM COATED, EXTENDED RELEASE ORAL at 09:03

## 2022-01-11 RX ADMIN — DICYCLOMINE HYDROCHLORIDE 20 MG: 20 TABLET ORAL at 11:54

## 2022-01-11 RX ADMIN — ASPIRIN 81 MG 81 MG: 81 TABLET ORAL at 09:03

## 2022-01-11 RX ADMIN — QUETIAPINE FUMARATE 25 MG: 25 TABLET ORAL at 21:52

## 2022-01-11 RX ADMIN — PANTOPRAZOLE SODIUM 40 MG: 40 TABLET, DELAYED RELEASE ORAL at 06:27

## 2022-01-11 RX ADMIN — INSULIN LISPRO 1 UNITS: 100 INJECTION, SOLUTION INTRAVENOUS; SUBCUTANEOUS at 21:53

## 2022-01-11 RX ADMIN — OXYCODONE AND ACETAMINOPHEN 1 TABLET: 7.5; 325 TABLET ORAL at 09:03

## 2022-01-11 RX ADMIN — ISOSORBIDE MONONITRATE 30 MG: 30 TABLET, EXTENDED RELEASE ORAL at 09:03

## 2022-01-11 RX ADMIN — ATORVASTATIN CALCIUM 80 MG: 80 TABLET, FILM COATED ORAL at 21:51

## 2022-01-11 RX ADMIN — RANOLAZINE 1000 MG: 500 TABLET, FILM COATED, EXTENDED RELEASE ORAL at 21:52

## 2022-01-11 RX ADMIN — PERFLUTREN 1.65 MG: 6.52 INJECTION, SUSPENSION INTRAVENOUS at 09:50

## 2022-01-11 RX ADMIN — Medication: at 18:07

## 2022-01-11 ASSESSMENT — PAIN DESCRIPTION - ONSET
ONSET: ON-GOING
ONSET: GRADUAL
ONSET: ON-GOING

## 2022-01-11 ASSESSMENT — PAIN DESCRIPTION - DESCRIPTORS
DESCRIPTORS: TIGHTNESS
DESCRIPTORS: TIGHTNESS
DESCRIPTORS: PRESSURE
DESCRIPTORS: PRESSURE
DESCRIPTORS: DISCOMFORT
DESCRIPTORS: TIGHTNESS
DESCRIPTORS: PRESSURE

## 2022-01-11 ASSESSMENT — PAIN DESCRIPTION - PAIN TYPE
TYPE: ACUTE PAIN

## 2022-01-11 ASSESSMENT — PAIN DESCRIPTION - ORIENTATION
ORIENTATION: LEFT;UPPER
ORIENTATION: LEFT
ORIENTATION: LEFT;UPPER

## 2022-01-11 ASSESSMENT — PAIN - FUNCTIONAL ASSESSMENT
PAIN_FUNCTIONAL_ASSESSMENT: PREVENTS OR INTERFERES SOME ACTIVE ACTIVITIES AND ADLS

## 2022-01-11 ASSESSMENT — PAIN DESCRIPTION - PROGRESSION
CLINICAL_PROGRESSION: GRADUALLY WORSENING
CLINICAL_PROGRESSION: GRADUALLY IMPROVING
CLINICAL_PROGRESSION: NOT CHANGED
CLINICAL_PROGRESSION: GRADUALLY WORSENING
CLINICAL_PROGRESSION: GRADUALLY IMPROVING
CLINICAL_PROGRESSION: GRADUALLY IMPROVING
CLINICAL_PROGRESSION: GRADUALLY WORSENING

## 2022-01-11 ASSESSMENT — PAIN DESCRIPTION - LOCATION
LOCATION: CHEST

## 2022-01-11 ASSESSMENT — PAIN SCALES - GENERAL
PAINLEVEL_OUTOF10: 3
PAINLEVEL_OUTOF10: 8
PAINLEVEL_OUTOF10: 4
PAINLEVEL_OUTOF10: 0
PAINLEVEL_OUTOF10: 7
PAINLEVEL_OUTOF10: 3
PAINLEVEL_OUTOF10: 7
PAINLEVEL_OUTOF10: 6

## 2022-01-11 ASSESSMENT — PAIN DESCRIPTION - FREQUENCY
FREQUENCY: INTERMITTENT

## 2022-01-11 ASSESSMENT — PAIN DESCRIPTION - DIRECTION
RADIATING_TOWARDS: UPPER BACK
RADIATING_TOWARDS: SHOULDER
RADIATING_TOWARDS: UPPER BACK
RADIATING_TOWARDS: SHOULDER

## 2022-01-11 NOTE — PROGRESS NOTES
[facility]  Progress Note    1/11/2022 9:59 AM    Chief Complaint: Chest Pain    Reason for Consult: Left Second Toe Discoloration    Subjective: Pt seen resting in bed, reports discomfort to Left Second toe, otherwise, reports no changes    Vital Signs:  Vitals:    01/11/22 0515 01/11/22 0607 01/11/22 0830 01/11/22 0841   BP: 112/64  115/62    Pulse: 62  59    Resp: 15  15    Temp: 98.2 °F (36.8 °C)  98.4 °F (36.9 °C)    TempSrc: Oral  Oral    SpO2: 99%  98% 98%   Weight:  111 lb 8.8 oz (50.6 kg)     Height:           I/O:    Intake/Output Summary (Last 24 hours) at 1/11/2022 0959  Last data filed at 1/11/2022 3486  Gross per 24 hour   Intake 1440 ml   Output --   Net 1440 ml       Physical Exam:   General: no apparent distress, alert and oriented x3, afebrile  Chest/Lungs: no accessory muscle use  Cardiac: regular rate and rhythm  Abdomen: soft, non-tender, non-distended  Extremities: warm and well perfused, no signs of cyanosis or ischemia, no significant edema, bilateral upper and lower extremity motorsensory intact, darkened discoloration to distal half of Left Second Toe with decreased sensation  Pulses:  -L DP: +2 palp  Wounds:   -Left Second Toe - darkened discoloration, skin intact, no skin breakdown    Labs:   Lab Results   Component Value Date     01/11/2022    K 3.8 01/11/2022    CL 99 01/11/2022    CO2 17 01/11/2022    BUN 40 01/11/2022    CREATININE 2.1 01/11/2022    GFRAA 29 01/11/2022    GFRAA 60 05/23/2013    LABGLOM 24 01/11/2022    GLUCOSE 134 01/11/2022    PHOS 2.2 10/23/2021    MG 2.10 10/23/2021    CALCIUM 8.9 01/11/2022     Lab Results   Component Value Date    WBC 4.0 01/11/2022    RBC 3.24 01/11/2022    HGB 10.3 01/11/2022    HCT 30.7 01/11/2022    MCV 94.8 01/11/2022    RDW 14.7 01/11/2022     01/11/2022     Lab Results   Component Value Date    INR 1.03 06/19/2021    PROTIME 12.0 06/19/2021        Scheduled Meds:    enoxaparin  30 mg SubCUTAneous Daily    sodium chloride flush  10 mL IntraVENous 2 times per day    amLODIPine  5 mg Oral BID    aspirin  81 mg Oral Daily    atorvastatin  80 mg Oral Nightly    clopidogrel  75 mg Oral Daily    dicyclomine  20 mg Oral 4x Daily AC & HS    DULoxetine  60 mg Oral Daily    insulin glargine  8 Units SubCUTAneous BID    isosorbide mononitrate  30 mg Oral Daily    linaclotide  145 mcg Oral QAM AC    metoprolol succinate  50 mg Oral Nightly    pantoprazole  40 mg Oral QAM AC    QUEtiapine  25 mg Oral Nightly    ranolazine  1,000 mg Oral BID    [Held by provider] torsemide  10 mg Oral Daily    insulin lispro  0-12 Units SubCUTAneous TID WC    insulin lispro  0-6 Units SubCUTAneous Nightly     Continuous Infusions:    sodium chloride 75 mL/hr at 01/10/22 1831    sodium chloride      dextrose      dextrose         Imaging:  Bilateral Lower Extremity Arterial Duplex - 1/10/2022  Summary     RICK not obtained due to incompressible vessels. However no evidence of  hemodynamically significant occlusive disease in both lower extremities. Echo - Pending    Assessment:   -Left Second Toe discoloration - pt reports presence for 1-2 months  -Palpable Left DP  -Bilateral Arterial Duplex with no significant occlusive disease    Plan:  -Echo to look for source of emboli    All pertinent information and plan of care discussed with Dr. Amber Bae. All questions and concerns were addressed with the patient. I have discussed the above stated plan with patient. The patient verbalized understanding and agreed with the plan. Thank you for allowing to us to participate in the care of [address]      Electronically signed by CYRUS Linton - CNP on 1/11/2022 at 9:59 AM     Pt seen and examined. for DIAGNOSIS OF painful second toe agree with CYRUS Linton's note. Labs reviewed. Vitals   Vitals:    01/11/22 0841 01/11/22 1130 01/11/22 1212 01/11/22 1728   BP:  (!) 121/57 125/63 (!) 141/61   Pulse:  63 52 55   Resp:   14 15   Temp:   98.2 °F (36.8 °C) 97.5 °F (36.4 °C)   TempSrc:   Oral Oral   SpO2: 98%  99% 98%   Weight:       Height:           Plantar surface of the second toe looks similar to all the other toes on the left dorsal surface does look abnormal and is tender got a Doppler signal in the toe yesterday. Duplex scan reviewed stiff calcified arteries cannot do ABIs but widely patent.   I do not hear atrial fibrillation today but she does have a history of it echo is read as normal

## 2022-01-11 NOTE — PROGRESS NOTES
Patient complaining of \"fluttering\" in chest that lasted \"a couple minutes\". /57 and HR 63. Secure message sent to Dr. Sofiya Damon.

## 2022-01-11 NOTE — PROGRESS NOTES
Occupational Therapy  Facility/Department: [address] ORTHOPEDICS  Daily Treatment Note  NAME: Shira Mathur  : 1955  MRN: [id number]    Date of Service: 2022    Discharge Recommendations:  Home with assist PRN  OT Equipment Recommendations  Equipment Needed: No  Other: encouraged pt to wear lifeline AATs, have cell phone to call neighbor for emergencies  Shira Mathur scored a 21/24 on the AM-PAC ADL Inpatient form. Current research shows that an AM-PAC score of 18 or greater is typically associated with a discharge to the patient's home setting. Based on the patient's AM-PAC score, and their current ADL deficits, it is recommended that the patient have 2-3 sessions per week of Occupational Therapy at d/c to increase the patient's independence. At this time, this patient demonstrates the endurance and safety to discharge home with home OT services, HHA and a follow up treatment frequency of 2-3x/wk. Please see assessment section for further patient specific details. If patient discharges prior to next session this note will serve as a discharge summary. Please see below for the latest assessment towards goals. Assessment   Performance deficits / Impairments: Decreased functional mobility ; Decreased endurance;Decreased ADL status; Decreased high-level IADLs;Decreased balance;Decreased posture  Assessment: 74 yo female admitted for Chest and R sided neck pain and KOLBY. PMH: neuropathy, PVD, CAD, CHF, HTN, DM, TIA (R side weaker), AFib, CKD, IBS, Fibromyalgia. PTA, pt lives alone with assist from aide for IADLs, otherwise, pt independent with ADL and fxl mobility. Today, pt functioning near baseline limited by fatigue and chest pain. Pt completes bed mobility IND'ly, and bed t/f with SBA using RW. Pt educated on gentle ROM and stretching. Pt declines ADL needs, anticipate SBA/SUP LB and UB ADL based on balance, endurance, pain.  She struggled to open [employer] roseanna on her phone, stating \"I can't see it\" however she was unable to access her email to retrieve code to reset password. She is eager to know what her Echo results were. Telemetry indicated HR between 53-55 while at rest despite having chest pain--RN aware. Anticipate if progresses to Mod I, able to d/c home to daily assist from aide for IADLs when medically stable  Treatment Diagnosis: impaired ADL/fxl mobility  Prognosis: Good  Decision Making: Medium Complexity  OT Education: Energy Conservation;Equipment  Patient Education: Importance of gentle neck and shoulder AROM to prevent stiffness d/t pain, educated on remaining seated for ADLs to conserve energy; keep lifeline on AATs in case of emergency  REQUIRES OT FOLLOW UP: Yes  Activity Tolerance  Activity Tolerance: Patient Tolerated treatment well  Activity Tolerance: having chest pain, RN aware; telemetry reports HR is 53-55  Safety Devices  Safety Devices in place: Yes  Type of devices: Nurse notified;Gait belt;Call light within reach; Left in bed         Patient Diagnosis(es): The primary encounter diagnosis was Chest pain, unspecified type. A diagnosis of History of coronary artery disease was also pertinent to this visit. has a past medical history of Acid reflux, Anemia, Anxiety and depression, Arthritis, Asthma, Atrial fibrillation ([address] CAD (coronary artery disease), Cerebral artery occlusion with cerebral infarction ([address] CHF (congestive heart failure) ([address] Chronic kidney disease--stage III, COPD (chronic obstructive pulmonary disease) ([address] DM2 (diabetes mellitus, type 2) ([address] Dysarthria, Fibromyalgia, Headache(784.0), Hemisensory loss, History of blood transfusion, Hyperlipidemia, Hypertension, IBS (irritable bowel syndrome), Inferior vena cava occlusion ([address] Keratitis, MI, old, Neuropathy, Superior vena cava obstruction, Temporal arteritis ([address] and Wears glasses. has a past surgical history that includes Tunneled venous port placement;  Cholecystectomy; ablation of dysrhythmic focus (1999  and 11/2020); Cataract removal (Bilateral); joint replacement (Right); Hysterectomy; Artery Biopsy (Right, 04/23/2014); Coronary angioplasty with stent (2020); Knee arthroscopy (Right); Percutaneous Transluminal Coronary Angio (10/2019); Upper gastrointestinal endoscopy (N/A, 7/6/2020); Carotid artery surgery (Left); Colonoscopy (N/A, 4/9/2021); and Colonoscopy (N/A, 10/15/2021). Restrictions  Restrictions/Precautions  Restrictions/Precautions: Fall Risk  Position Activity Restriction  Other position/activity restrictions: telemetry, 1500mL FR, CCx4, low sodium, potassium, phosphorus diet  Subjective   General  Chart Reviewed: Yes  Patient assessed for rehabilitation services?: Yes  Additional Pertinent Hx: 72 yo female admitted for Chest and R sided neck pain and KOLBY. PMH: neuropathy, PVD, CAD, CHF, HTN, DM, TIA (R side weaker), AFib, CKD, IBS, Fibromyalgia  Family / Caregiver Present: No  Referring Practitioner: Sulema Cleaning MD  Diagnosis: Chest pain  Subjective  Subjective: Pt resting in bed upon arrival, reporting chest \"discomfort\" @ 5/10, RN aware (pt has 20 minutes before pain meds are due).   General Comment  Comments: pt asking OT to assist w/ signing into her [place] roseanna on cell phone, states \"I can't see\"      Orientation  Orientation  Overall Orientation Status: Within Normal Limits  Objective    ADL  Feeding: Independent  LE Dressing: Independent (able to doff/don socks while in bed)  Additional Comments: Pt declines ADL needs (OT provided set up in bathrm), anticipate SBA/SUP LB and UB ADL based on pain level        Toilet Transfers  Toilet - Technique: Ambulating  Equipment Used: Grab bars  Toilet Transfer: Stand by assistance  Toilet Transfers Comments: cues for hand placement (yesterday's session 1/10/22)  Wheelchair Bed Transfers  Wheelchair/Bed - Technique: Ambulating  Equipment Used: Bed  Level of Asssistance: Supervision;Stand by assistance  Wheelchair Transfers Comments: using RW  Bed mobility  Supine to Sit: Modified independent  Sit to Supine: Modified independent  Comment: gets herself in & out of bed, used rail prn  Transfers  Sit to stand: Stand by assistance  Stand to sit: Stand by assistance  Transfer Comments: RW. cues for hand placement (OT offered to assist pt into bathrm or toilet but declined; report taken from yesterday's session on 1/10/22)                       Cognition  Overall Cognitive Status: WNL                                         Plan   Plan  Times per week: 2-3  Current Treatment Recommendations: Strengthening,Positioning,Patient/Caregiver Education & Training,Endurance Training,ROM,Pain Management,Balance Training,Safety Education & Training,Self-Care / ADL       AM-PAC Score        AM-PAC Inpatient Daily Activity Raw Score: 21 (01/11/22 1459)  AM-PAC Inpatient ADL T-Scale Score : 44.27 (01/11/22 1459)  ADL Inpatient CMS 0-100% Score: 32.79 (01/11/22 1459)  ADL Inpatient CMS G-Code Modifier : Thornell Lesches (01/11/22 1459)    Goals  Short term goals  Time Frame for Short term goals: prior to d/c  Short term goal 1: toileting Mod I  Short term goal 2: LB dressing Mod I  Short term goal 3: UB bathing/dressing Mod I  Short term goal 4: Tolerate 5 min fxl standing task Mod I  Short term goal 5: BUE exercises in all planes (gentle R neck/shoulder) to improve ROM, strength and endurance for ADL and tx  Patient Goals   Patient goals : improve pain       Therapy Time   Individual Concurrent Group Co-treatment   Time In 1420         Time Out 1450         Minutes 30         Timed Code Treatment Minutes: [id number] [address] Minutes       [id number] Rissa Mg, OTR/L #[id number]

## 2022-01-11 NOTE — PROGRESS NOTES
Patient resting quietly in bed. Alert and oriented. Percocet given for 7/10 chest/shoulder pain. VSS. Breakfast eaten. IV fluids infusing. Tolerating ambulation with walker. Bed alarm on. Call light and belongings within reach. Patient able to make needs known. Will continue to monitor.      Electronically signed by Trev Jean RN on 1/11/2022 at 11:29 AM

## 2022-01-11 NOTE — PROGRESS NOTES
[facility]   Daily Progress Note      Admit Date:  1/9/2022    CC: chest pain    HPI:   Refharris Nicholson is a 73 y.o. female with PMH CAD, PAD, PAF s/p ablation X2, DHF, CKD    Patient admitted to [address] [facility] with progressivley worsening chest pain X2 days. Intermittent, midsternal, non radiating. Occurs with activity. Dr. Teresa Hester consulted> determined CP chronic and  non cardiac. Today her chest pain is resolved. She did C/O brief intermittent \"fluttering\" in her chest that lasted a few seconds then resolved. She is able to ambulate in the room without issues. She does C/O numbness/tingling bilateral top of feet left>right. She also admits to pain in her calves bilateral with activity, improves with rest.     Denies CP, SOB, LH, dizziness, syncope, orthopnea. Increase Creat today- Neph consulted. Review of Systems:   General: Denies fever, chills, fatigue, weakness  Skin: Denies skin changes, rash, itching, lesions.   HEENT: Denies headache, dizziness, vision changes, nosebleeds, sore throat, nasal drainage  RESP: Denies cough, sputum, dyspnea, wheeze, snoring  CARD: Denies palpitations,  murmur  GI:Denies nausea, vomiting, heartburn, loss of appetite, change in bowels  : Denies frequency, pain, incontinence, polyuria  VASC: Denies claudication, leg cramps, clots  MUSC/SKEL: Denies pain, stiffness, arthritis  PSYCH: Denies anxiety, depression, stress  NEURO: Denies numbness, tingling, weakness,change in mood or memory  HEME: Denies abn bruising, bleeding, anemia  ENDO: Denies intolerance to heat, cold, excessive thirst or hunger, hx thyroid disease    Objective:   /62   Pulse 59   Temp 98.4 °F (36.9 °C) (Oral)   Resp 15   Ht 5' (1.524 m)   Wt 111 lb 8.8 oz (50.6 kg)   SpO2 98%   BMI 21.79 kg/m²         Intake/Output Summary (Last 24 hours) at 1/11/2022 0842  Last data filed at 1/11/2022 0805  Gross per 24 hour   Intake 1320 ml   Output --   Net 1320 ml       WEIGHT:Admit Weight: 111 lb 8.8 oz (50.6 kg)         Today  Weight: 111 lb 8.8 oz (50.6 kg)   DRY WEIGHT:  Wt Readings from Last 3 Encounters:   01/11/22 111 lb 8.8 oz (50.6 kg)   01/04/22 107 lb (48.5 kg)   12/07/21 107 lb (48.5 kg)       Physical Exam:  GEN: Appears frail, no acute distress  SKIN: Brown, warm, dry. Nails without clubbing. HEENT: PERRLA. Normocephalic, atraumatic. Neck supple. No adenopathy. LUNG: AP diameter normal. Clear bilateral. No wheeze, rales, or ronchi. Respiratory effort normal.  HEART: S1S2 A/R. No JVD. No carotid bruit. No murmur, rub or gallop. ABD: Soft, nontender. +BS X 4 quads. No hepatomegaly. EXT: Radial and pedal pulses 2+ and symmetric. Without varicosities. No edema. MUSCSKEL: Good ROM X4 extremities. No deformity. NEURO: A/O X3. Calm and cooperative.      Telemetry: NSR HR 53    Medications:    enoxaparin  30 mg SubCUTAneous Daily    sodium chloride flush  10 mL IntraVENous 2 times per day    amLODIPine  5 mg Oral BID    aspirin  81 mg Oral Daily    atorvastatin  80 mg Oral Nightly    clopidogrel  75 mg Oral Daily    dicyclomine  20 mg Oral 4x Daily AC & HS    DULoxetine  60 mg Oral Daily    insulin glargine  8 Units SubCUTAneous BID    isosorbide mononitrate  30 mg Oral Daily    linaclotide  145 mcg Oral QAM AC    metoprolol succinate  50 mg Oral Nightly    pantoprazole  40 mg Oral QAM AC    QUEtiapine  25 mg Oral Nightly    ranolazine  1,000 mg Oral BID    [Held by provider] torsemide  10 mg Oral Daily    insulin lispro  0-12 Units SubCUTAneous TID WC    insulin lispro  0-6 Units SubCUTAneous Nightly      sodium chloride 75 mL/hr at 01/10/22 1831    sodium chloride      dextrose      dextrose       sodium chloride flush, sodium chloride, promethazine **OR** ondansetron, acetaminophen **OR** acetaminophen, glucose, dextrose, glucagon (rDNA), dextrose, albuterol, oxyCODONE-acetaminophen, tiZANidine, glucose, dextrose, glucagon (rDNA), dextrose, morphine    Lab Data: I have reviewed all labs below today. CBC:   Recent Labs     01/09/22  1233 01/10/22  0427   WBC 4.9 3.9*   HGB 11.9* 10.7*   HCT 35.6* 32.5*   MCV 94.8 97.3    206     BMP:   Recent Labs     01/09/22  1233 01/10/22  0427    139   K 4.2 3.8    108   CO2 20* 19*   BUN 22* 26*   CREATININE 1.2 1.7*     GLUCOSE:   Recent Labs     01/09/22  1233 01/10/22  0427   GLUCOSE 231* 140*     LIVER PROFILE:   Lab Results   Component Value Date    AST 23 01/09/2022    ALT 13 01/09/2022    LIPASE 35.0 10/18/2021    AMYLASE 44 10/27/2015    LABALBU 3.9 01/09/2022    BILIDIR <0.2 08/22/2019    BILITOT 0.4 01/09/2022    ALKPHOS 120 01/09/2022     PT/INR:   Lab Results   Component Value Date    PROTIME 12.0 06/19/2021    PROTIME 10.3 12/15/2009    INR 1.03 06/19/2021    INR 1.00 04/01/2021    INR 1.02 03/01/2021     APTT:   Lab Results   Component Value Date    APTT 52.4 06/21/2021     Pro-BNP:    Lab Results   Component Value Date    PROBNP 1,421 10/18/2021    PROBNP 758 07/30/2021    PROBNP 515 06/19/2021     Parameters:   > 450 pg/mL under age 48  > 900 pg/mL ages 54-65  > 1800 pg/mL over age 76    ENZYMES:  Lab Results   Component Value Date    CKTOTAL 41 08/22/2019    CKTOTAL 225 10/06/2013    CKTOTAL 182 07/18/2013    CKMB 2.3 10/06/2013    CKMB 0.8 07/18/2013    CKMB 2.28 07/06/2012    CKMB 1.34 09/25/2010    CKMB 1.49 09/24/2010    CKMB 0.40 04/02/2010    CKMB 0.48 04/01/2010    CKMBINDEX 0.9 04/01/2010    TROPONINI <0.01 01/10/2022    TROPONINI <0.01 01/09/2022    TROPONINI <0.01 01/09/2022     FASTING LIPID PANEL:  Lab Results   Component Value Date    CHOL 185 08/29/2019    HDL 70 08/29/2019    HDL 58 05/14/2012    LDLCALC 89 08/29/2019    TRIG 131 08/29/2019       Diagnostics:    EKG: Sinus bradycardia  diffuse nonspecific T wave abnormalities  abnormal ECG  Confirmed by OLIVE GOOD ([id number]) on 1/10/2022 2:27:34 PM    ECHO: 1/11/2022   Summary   Normal LV size and wall motion. EF is    60%.  Grade II diastolic dysfunction   with elevated LV filling pressures. The left atrium is mildly dilated. Normal right ventricular size and function   Trivial Mitral and Tricuspid regurgitation. Stress Test: 3/20/15983  Summary    Pharmacological Stress/MPI Results:        1. Technically a satisfactory study.    2. No evidence of Ischemia by Myocardial Perfusion Imaging.    3. Gated Study shows normal LV size and Systolic function; EF is 70 %. BLE arterial Doppler 1/11/2021  Right Impression  Right RICK was not available due to noncompressible tibial vessels . Imaging of the right lower extremity reveals normal multiphasic flow with no evidence of hemodynamically significant stenosis. Ultrasound images of the right lower extremity reveal severe calcific plaque formation throughout. Left Impression  Left RICK was not available due to noncompressible tibial vessels . Imaging of the left lower extremity reveals normal multiphasic flow with no evidence of hemodynamically significant stenosis. Ultrasound images of the left lower extremity reveal severe calcific plaque formation throughout. Assessment/Plan:    1.) CAD s/p DEANGELO to RCA 6/2016, DEANGELO to prox RCA and LCX 2018; angioplasty to OM1 and DEANGELO to PDA 11/2020, normal stress 3/2021: C/O chest pain on admission- non cardiac- trop and EKG without ischemia.   DAPT: asa, plavix  Beta Blocker: toprol XL  ACEi/ARB:  Anti anginal: imdur/raneza  Lipid management/high intensity statin: lipitor  Risk factor management: high blood pressure, high cholesterol, Diabetes, smoking, obesity, family hx  Lifestyle modification: Heart healthy diet, regular exercise, weight loss, smoking cessation, stress reduction    2.) Paroxysmal AF: NSR HR controlled  CHADSVASC-     HASBLED-  Thromboembolic risk reduction: none D/T Hx GIB  Rate Control/ Rhythm Control: Toprol XL    3.) Chronic diastolic heart failure: Euvolemic  NYHA Class II   Stage C  Diuretic:demadex  ACEI/ARB/ARNI, BB, AA, SGLT2i, Cardiac rehab and ICD not indicated for normal EF.  2gm Na diet, daily weight, 64 oz fluid restriction  Avoid NSAIDS and other nephrotoxic meds       4.) PAD: + claudication  Arterial dopplers completed - Dr. Alyssa Aquino to FU as OP.     5.) Hypertension:   Goal BP <130/80. Non pharmacologic interventions include:   -weight loss  -heart healthy low sodium and low fat diet that consist of mostly fruits, vegetables and grains (Dash diet)  -limited amount of alcohol (no more than 1 drink/day for women, 2 drinks/day for men)  -regular physical activity  -no smoking  -stress reduction    Patient is stable from cardiology standpoint and will sign off. FU with Dr. Alyssa Aquino in 2-4 weeks.      Electronically signed by CYRUS Lombardo CNP on 1/11/2022 at 8:42 AM

## 2022-01-11 NOTE — CARE COORDINATION
[facility] On Aging waiver manager - [address] [facility]. Ph: [phone] [facility]  Administrative Assist, Case Management  [phone]  Electronically signed by Xin Benton on 1/11/2022 at 6:27 AM

## 2022-01-11 NOTE — PROGRESS NOTES
Hospitalist Progress Note      PCP: Krystyna Glasgow MD    Date of Admission: 1/9/2022        Subjective: Feels okay, no shortness of breath no leg pain no dizziness or lightheadedness      Medications:  Reviewed    Infusion Medications    sodium chloride 75 mL/hr at 01/10/22 1831    sodium chloride      dextrose      dextrose       Scheduled Medications    enoxaparin  30 mg SubCUTAneous Daily    sodium chloride flush  10 mL IntraVENous 2 times per day    amLODIPine  5 mg Oral BID    aspirin  81 mg Oral Daily    atorvastatin  80 mg Oral Nightly    clopidogrel  75 mg Oral Daily    dicyclomine  20 mg Oral 4x Daily AC & HS    DULoxetine  60 mg Oral Daily    insulin glargine  8 Units SubCUTAneous BID    isosorbide mononitrate  30 mg Oral Daily    linaclotide  145 mcg Oral QAM AC    metoprolol succinate  50 mg Oral Nightly    pantoprazole  40 mg Oral QAM AC    QUEtiapine  25 mg Oral Nightly    ranolazine  1,000 mg Oral BID    [Held by provider] torsemide  10 mg Oral Daily    insulin lispro  0-12 Units SubCUTAneous TID WC    insulin lispro  0-6 Units SubCUTAneous Nightly     PRN Meds: sodium chloride flush, sodium chloride, promethazine **OR** ondansetron, acetaminophen **OR** acetaminophen, glucose, dextrose, glucagon (rDNA), dextrose, albuterol, oxyCODONE-acetaminophen, tiZANidine, glucose, dextrose, glucagon (rDNA), dextrose, morphine      Intake/Output Summary (Last 24 hours) at 1/11/2022 0847  Last data filed at 1/11/2022 0805  Gross per 24 hour   Intake 1320 ml   Output --   Net 1320 ml       Physical Exam Performed:    /62   Pulse 59   Temp 98.4 °F (36.9 °C) (Oral)   Resp 15   Ht 5' (1.524 m)   Wt 111 lb 8.8 oz (50.6 kg)   SpO2 98%   BMI 21.79 kg/m²     General appearance: No apparent distress  Neck: Supple  Respiratory:  Normal respiratory effort. Clear to auscultation, bilaterally without Rales/Wheezes/Rhonchi.   Cardiovascular: Regular rate and rhythm with normal S1/S2 without murmurs, rubs or gallops. Abdomen: Soft, non-tender, non-distended  Musculoskeletal: No clubbing, cyanosis, discoloration of left second toe  Skin: Skin color, texture, turgor normal.  No rashes or lesions. Neurologic:  NO FOCAL WEAKNESS   Psychiatric: Alert and oriented  Capillary Refill: Brisk,3 seconds, normal   Peripheral Pulses: +2 palpable, equal bilaterally       Labs:   Recent Labs     01/09/22  1233 01/10/22  0427 01/11/22  0758   WBC 4.9 3.9* 4.0   HGB 11.9* 10.7* 10.3*   HCT 35.6* 32.5* 30.7*    206 177     Recent Labs     01/09/22  1233 01/10/22  0427    139   K 4.2 3.8    108   CO2 20* 19*   BUN 22* 26*   CREATININE 1.2 1.7*   CALCIUM 9.7 9.0     Recent Labs     01/09/22  1233   AST 23   ALT 13   BILITOT 0.4   ALKPHOS 120     No results for input(s): INR in the last 72 hours. Recent Labs     01/09/22  1233 01/09/22  2156 01/10/22  0427   TROPONINI <0.01 <0.01 <0.01       Urinalysis:      Lab Results   Component Value Date    NITRU Negative 11/26/2021    WBCUA 15 11/26/2021    BACTERIA 1+ 11/26/2021    RBCUA 3 11/26/2021    BLOODU TRACE-INTACT 11/26/2021    SPECGRAV 1.025 11/26/2021    GLUCOSEU Negative 11/26/2021    GLUCOSEU NEGATIVE 05/14/2012       Radiology:  VL DUP LOWER EXTREMITY ARTERIES BILATERAL   Final Result      XR CHEST (2 VW)   Final Result   1. No acute abnormality. Assessment/Plan:    Active Hospital Problems    Diagnosis     Moderate malnutrition ([facility] [address] [address] [E44.0]     Chest pain [R07.9]      1. Chest pain, monitor troponin, telemetry monitoring, cardiology consulted on admission. On aspirin and Plavix, no further work-up recommended per cardiology  2. Diabetes mellitus, sliding scale  3. COPD, no acute exacerbation  4. A. fib, controlled  5. Acute kidney injury, monitor closely, pending BMP this morning  6. Anemia, appears chronic, follow-up with PCP for further work-up  7.  Peripheral vascular disease with discoloration of second left toe, vascular surgery consulted, Doppler noted with no significant occlusive disease, echo ordered per vascular and pending. Diet: ADULT DIET; Regular; 4 carb choices (60 gm/meal); Low Sodium (2 gm); Low Potassium (Less than 3000 mg/day);  Low Phosphorus (Less than 1000 mg); 1500 ml  ADULT ORAL NUTRITION SUPPLEMENT; Lunch, Dinner; Clear Liquid Oral Supplement  Code Status: Full Code        Alma Delia Reynoso MD

## 2022-01-11 NOTE — PROGRESS NOTES
PT AAO x4 per this shift. VSS. Pt able to ambulate with walker. Pt tolerating diet. Pt c/o of ongoing pain to L upper chest to left arm. Managed with Prn medication, rest, emotional support. Pt up to void only at end of 12 hour shift. Post void bladder scan 154 ml. Pt resting well. No further needs voiced at this time. Fall precautions in place. Bed alarm on. Call light within reach. Will continue to round.  Electronically signed by Radha Turk RN on 1/11/2022 at 8:05 AM

## 2022-01-11 NOTE — FLOWSHEET NOTE
01/11/22 0916   Encounter Summary   Services provided to: Patient   Referral/Consult From: Nurse   Place of [address] Visiting   (visit and prayer 1/11 CL)   Complexity of Encounter Moderate   Length of Encounter 15 minutes   Spiritual/Mormon   Type Spiritual support   Assessment Calm; Approachable;Coping   Intervention Active listening;Explored feelings, thoughts, concerns;Prayer;Discussed belief system/Adventism practices/luis;Discussed illness/injury and it's impact   Outcome Engaged in conversation;Coping; Hopeful   Electronically signed by Jaimee Renteria on 1/11/2022 at 9:17 AM

## 2022-01-11 NOTE — PROGRESS NOTES
Physical Therapy    Facility/Department: [facility] 3 ORTHOPEDICS  Daily Treatment Note    This note serves as patient discharge summary if pt discharges prior to next PT visit      NAME: Malika Dent  : 1955  MRN: [id number]    Date of Service: 2022    Discharge Recommendations:  Continue to assess pending progress,Home with assist PRN   PT Equipment Recommendations  Other: cont to assess. GEETA RW? Rollator? Assessment   Assessment: per H and P:  \"Patient is a 60-year-old female with past medical history of atrial fibrillation CAD, COPD diabetes mellitus who presents to the hospital for chest pain. According to patient she has chest pain, substernal, 7/10 intensity, which is started while she was sleeping at 7 AM in the morning, mentions it radiates from her left shoulder, associated with some shortness of breath, 7/10 intensity. Mentions she had similar episodes of chest pains in the past when she had to get stents placed mentions she has 6-7 stents. \"  Caridology consulted. PMHx also includes R TIA with weakness, CKD lll, fibromyalgia. Prior Status:  Lives alone in senior [facility] with level entry and elevator. Independent in transfers and apartment mobility with cane as needed. Has home aide 5 hours/day, 5 days a week for ADLs, IADLs. [employer] on [employer]. Status 2022:  Bed mobility Mod Indep. Transfers Supervision. Gait 20' with CGA arm hold assist. This is not max distance. Anticipate progression of function to point that returning to home with assist as prior will be realistic. She could potentially benefit from a RW. Will assess at next session. Prognosis: Good  History: as noted  REQUIRES PT FOLLOW UP: Yes       Patient Diagnosis(es): The primary encounter diagnosis was Chest pain, unspecified type. A diagnosis of History of coronary artery disease was also pertinent to this visit.      has a past medical history of Acid reflux, Anemia, Anxiety and depression, Arthritis, Asthma, Atrial fibrillation (HCC), CAD (coronary artery disease), Cerebral artery occlusion with cerebral infarction ([address] CHF (congestive heart failure) (HCC), Chronic kidney disease--stage III, COPD (chronic obstructive pulmonary disease) ([address] DM2 (diabetes mellitus, type 2) ([address] Dysarthria, Fibromyalgia, Headache(784.0), Hemisensory loss, History of blood transfusion, Hyperlipidemia, Hypertension, IBS (irritable bowel syndrome), Inferior vena cava occlusion ([address] Keratitis, MI, old, Neuropathy, Superior vena cava obstruction, Temporal arteritis ([facility] [address] [facility] and Wears glasses. has a past surgical history that includes Tunneled venous port placement; Cholecystectomy; ablation of dysrhythmic focus (1999  and 11/2020); Cataract removal (Bilateral); joint replacement (Right); Hysterectomy; Artery Biopsy (Right, 04/23/2014); Coronary angioplasty with stent (2020); Knee arthroscopy (Right); Percutaneous Transluminal Coronary Angio (10/2019); Upper gastrointestinal endoscopy (N/A, 7/6/2020); Carotid artery surgery (Left); Colonoscopy (N/A, 4/9/2021); and Colonoscopy (N/A, 10/15/2021). Restrictions  Restrictions/Precautions  Restrictions/Precautions: Fall Risk  Position Activity Restriction  Other position/activity restrictions: telemetry, 1500mL FR, CCx4, low sodium, potassium, phosphorus diet     Vision/Hearing  Vision: Impaired  Vision Exceptions: Wears glasses at all times  Hearing: Within functional limits       Subjective  General  Chart Reviewed: Yes  Additional Pertinent Hx: per H and P:  \"Patient is a 72-year-old female with past medical history of atrial fibrillation CAD, COPD diabetes mellitus who presents to the hospital for chest pain. According to patient she has chest pain, substernal, 7/10 intensity, which is started while she was sleeping at 7 AM in the morning, mentions it radiates from her left shoulder, associated with some shortness of breath, 7/10 intensity.   Mentions she had similar episodes of chest pains in the past when she had to get stents placed mentions she has 6-7 stents. \"  Caridology consulted. PMHx also includes R TIA with weakness, CKD lll, fibromyalgia. Response To Previous Treatment: Patient with no complaints from previous session. Family / Caregiver Present: No  Referring Practitioner: Earlene Beebe MD  Referral Date : 01/09/22  Subjective  Subjective: AM: Reporting chest pain. Pain Screening  Patient Currently in Pain: Yes    Orientation  Orientation  Overall Orientation Status: Within Normal Limits     Social/Functional History  Social/Functional History  Lives With: Alone  Type of Home: Apartment (3rd floor)  Home Layout: One level  Home Access: Level entry,Elevator  Bathroom Shower/Tub: Walk-in shower  Bathroom Toilet: Standard  Bathroom Equipment: Grab bars in shower,Built-in shower seat,Grab bars around toilet  Bathroom Accessibility: Walker accessible  Home Equipment: Cane,Lift chair,Alert Button  ADL Assistance: Needs assistance (aide assists as needed)  Homemaking Assistance: Needs assistance (meals on wheels 5x/week, aide assist with laundry and cleaning)  Ambulation Assistance: Independent (with cane as needed inside. Always in community)  Transfer Assistance: Independent  Active : No  Occupation: On disability  Leisure & Hobbies: Listen to music. IADL Comments: HHA 5 hours/day, 5 days per week:  cooking, laundry, grocery, cleaning. Meals on Wheels  Additional Comments: sleeps in flat bed     Cognition   WFLs    Objective  Bed mobility  Supine to Sit: Modified independent (HOB up.  Fluid movement, trace increased time.)  Sit to Supine: Modified independent (to flat stretcher, trace extra time.)  Transfers  Sit to Stand: Supervision  Stand to sit: Supervision  Ambulation  Ambulation?: Yes  Ambulation 1  Surface: level tile  Device:  (Patient R UE supported, CGA.)  Assistance: Contact guard assistance (Patient R UE supported, CGA)  Quality of Gait: Step through pattern. Decreased stance time R LE. Gait Deviations: Slow Marianela  Distance: 20' (not max distance; ambulated to stretcher for transport to testing. Plan   Plan  Times per week: 3-5  Current Treatment Recommendations: Functional Mobility Training,Transfer Training,Positioning  Safety Devices  Type of devices: Call light within reach,Gait belt,Left in chair,Nurse notified (YOLETTE Hernandez informed)      AM-PAC Score  AM-PAC Inpatient Mobility Raw Score : 22 (01/11/22 0932)  AM-PAC Inpatient T-Scale Score : 53.28 (01/11/22 0932)  Mobility Inpatient CMS 0-100% Score: 20.91 (01/11/22 0932)  Mobility Inpatient CMS G-Code Modifier : Thermon Abt (01/11/22 0932)    Goals  Short term goals  Time Frame for Short term goals: By acute DC  Short term goal 1: Bed Mobility Indep  Short term goal 2: Transfers Supervision  Short term goal 3: Gait with LRAD 100' Supervision  Patient Goals   Patient goals : \"To feel better. \"       Therapy Time   Individual Concurrent Group Co-treatment   Time In [address] [facility] [address]         Time Out 0925         Minutes 13            Gt 13    Stephan Cline, PT  Electronically signed by Darien Borjas, [address] (#[id number])  on 1/11/2022 at 6:09 PM

## 2022-01-11 NOTE — PROGRESS NOTES
Pharmacy Medication History Note    List of current medications patient is taking is complete. Source of Information:   1. Patient   2. Medication list in Epic       Medication Notes:  1. Current Novolog dose is 8 units twice daily with meals  2. Basaglar dose is 10 units twice daily  3. She takes Aimovig once monthly on the 12th. She understands that we will not be able to supply this for her tomorrow.   Denies other OTC/Herbal use    Allergies clarified:ELIF Crooks [facility]  1/11/2022  11:38 AM

## 2022-01-11 NOTE — CONSULTS
Nephrology Consult Note   [facility]      Chief Complaint: Chest pain    History of Present Illness: Ms Enzo Hernandez is a 72 yo female with a past medical history significant for A-fib, CAD, TIA, CHF, COPD, CKD 3, DM II, HLD, HTN, IBS, Cardiac Ablations, Left Carotid Endarterectomy, Coronary Stents, and Multiple Port Placements that presented with Chest pain radiating to her left shoulder and back (she has been seen by Cardiology who feel it is non cardiac in nature) - she also has discoloration in 2nd toe of left foot and Vascular is evaluating that. She denies NSAID use. She has not had recent IV contrast exposure - Creatinine has been trending up from 1.2 to its current value of 2.1 mg/dl. Subjective:    Resting in bed; NAD; no shortness of breath    ROS: + discoloration of 2nd toe; + chest pain; + poor po intake; no edema; decreased urine output;  Elevated BP on admission that has since then come down pretty quickly    Scheduled Meds:   [START ON 1/12/2022] amLODIPine  5 mg Oral Daily    metoprolol succinate  25 mg Oral Nightly    enoxaparin  30 mg SubCUTAneous Daily    sodium chloride flush  10 mL IntraVENous 2 times per day    aspirin  81 mg Oral Daily    atorvastatin  80 mg Oral Nightly    clopidogrel  75 mg Oral Daily    dicyclomine  20 mg Oral 4x Daily AC & HS    DULoxetine  60 mg Oral Daily    insulin glargine  8 Units SubCUTAneous BID    isosorbide mononitrate  30 mg Oral Daily    linaclotide  145 mcg Oral QAM AC    pantoprazole  40 mg Oral QAM AC    QUEtiapine  25 mg Oral Nightly    ranolazine  1,000 mg Oral BID    [Held by provider] torsemide  10 mg Oral Daily    insulin lispro  0-12 Units SubCUTAneous TID WC    insulin lispro  0-6 Units SubCUTAneous Nightly        sodium bicarbonate infusion      sodium chloride      dextrose      dextrose         PRN Meds:.sodium chloride flush, sodium chloride, promethazine **OR** ondansetron, acetaminophen **OR** acetaminophen, glucose, dextrose, glucagon (rDNA), dextrose, albuterol, oxyCODONE-acetaminophen, tiZANidine, glucose, dextrose, glucagon (rDNA), dextrose, morphine    Physical Exam:    TEMPERATURE:  Current - Temp: 98.2 °F (36.8 °C);  Max - Temp  Av °F (36.7 °C)  Min: 97.3 °F (36.3 °C)  Max: 98.4 °F (36.9 °C)  RESPIRATIONS RANGE: Resp  Avg: 15.2  Min: 14  Max: 16  PULSE RANGE: Pulse  Av.8  Min: 49  Max: 63  BLOOD PRESSURE RANGE:  Systolic (78MQF), ZGJ:670 , Min:112 , CAQ:709   ; Diastolic (68SMB), XUO:61, Min:57, Max:64    24HR INTAKE/OUTPUT:      Intake/Output Summary (Last 24 hours) at 2022 1524  Last data filed at 2022 1202  Gross per 24 hour   Intake 1440 ml   Output --   Net 1440 ml       Patient Vitals for the past 96 hrs (Last 3 readings):   Weight   22 0607 111 lb 8.8 oz (50.6 kg)   01/10/22 0613 109 lb 12.6 oz (49.8 kg)   22 1047 111 lb 8.8 oz (50.6 kg)       General: Alert, Awake, NAD  HEENT: Normocephalic, atraumatic, Nose and ears appear externally without deformity, MMM  Neck: No Thyromegaly, Trachea is midline, No Carotid bruit  Eyes: EOMI, RIAN  Chest: clear to auscultation, no intercostal retractions  CVS: RRR, no murmur, no rub  Abdomen: soft, non tender, no organomegaly, no bruit appreciated  Extremities: no edema, blackish discoloration of 2nd toe left foot  Skin: normal texture, normal skin turgor, no rash  Musculoskeletal: normal ROM, no joint swelling, no visible deformity  Neurological: CN intact, no focal motor neurological deficit  Psych: normal affect; AAO x 3    Past Medical History:   Diagnosis Date    Acid reflux     Anemia     Anxiety and depression     Arthritis     Asthma     Atrial fibrillation (HCC)     CAD (coronary artery disease) 12/3/2012    Cerebral artery occlusion with cerebral infarction (HCC)     TIA\"\"S--right sided weakness & headache    CHF (congestive heart failure) (HCC)     Chronic kidney disease--stage III     40% kidney function    COPD (chronic obstructive pulmonary disease) ([facility]     DM2 (diabetes mellitus, type 2) ([facility]     Dysarthria     Fibromyalgia 6/7/2016    Headache(784.0) 2/19/2013    Hemisensory loss     History of blood transfusion 11/2020    pt denies having transfusion reaction    Hyperlipidemia     Hypertension     IBS (irritable bowel syndrome)     Inferior vena cava occlusion (HCC)     Keratitis     MI, old     Neuropathy     Superior vena cava obstruction     Temporal arteritis ([facility] 2/24/2014    Wears glasses      Past Surgical History:   Procedure Laterality Date    ABLATION OF DYSRHYTHMIC FOCUS  1999  and 11/2020    times 2    ARTERY BIOPSY Right 04/23/2014    RIGHT TEMPORAL ARTERY BIOPSY    CAROTID ARTERY SURGERY Left     clean up per pt    CATARACT REMOVAL Bilateral     CHOLECYSTECTOMY      COLONOSCOPY N/A 4/9/2021    COLONOSCOPY WITH BIOPSY performed by Brandon Maldonado MD at [address] [facility] 10/15/2021    COLONOSCOPY performed by Brandon Maldonado MD at [facility] 19 2020    HYSTERECTOMY      JOINT REPLACEMENT Right     KNEE ARTHROSCOPY Right     PTCA  10/2019    LAD and RCA inrtervention    TUNNELED VENOUS PORT PLACEMENT      left thigh. SMART PORT-----Removed--total of 4 port placement and removal    UPPER GASTROINTESTINAL ENDOSCOPY N/A 7/6/2020    EGD DIAGNOSTIC ONLY performed by Gris Loza MD at [address] [place] [address] Road History   Problem Relation Age of Onset    Diabetes Mother     High Cholesterol Mother     Stroke Mother     Cancer Mother     High Blood Pressure Mother     No Known Problems Paternal Grandfather         lung issues      Social History     Socioeconomic History    Marital status:       Spouse name: Not on file    Number of children: 6    Years of education: Not on file    Highest education level: Not on file   Occupational History    Not on file   Tobacco Use    Smoking status: Former Smoker Packs/day: 0.50     Years: 35.00     Pack years: 17.50     Types: Cigarettes     Quit date: 7/1/2018     Years since quitting: 3.5    Smokeless tobacco: Never Used    Tobacco comment: 5/13/15 has not smoked since hospitalization - kh   Vaping Use    Vaping Use: Never used   Substance and Sexual Activity    Alcohol use: No     Alcohol/week: 0.0 standard drinks    Drug use: Never    Sexual activity: Not Currently     Partners: Male   Other Topics Concern    Not on file   Social History Narrative    Not on file     Social Determinants of Health     Financial Resource Strain:     Difficulty of Paying Living Expenses: Not on file   Food Insecurity:     Worried About Running Out of Food in the Last Year: Not on file    Comfort of Food in the Last Year: Not on file   Transportation Needs:     Lack of Transportation (Medical): Not on file    Lack of Transportation (Non-Medical):  Not on file   Physical Activity:     Days of Exercise per Week: Not on file    Minutes of Exercise per Session: Not on file   Stress:     Feeling of Stress : Not on file   Social Connections:     Frequency of Communication with Friends and Family: Not on file    Frequency of Social Gatherings with Friends and Family: Not on file    Attends Episcopal Services: Not on file    Active Member of [address] [place] [address] [employer] or Organizations: Not on file    Attends Club or Organization Meetings: Not on file    Marital Status: Not on file   Intimate Partner Violence:     Fear of Current or Ex-Partner: Not on file    Emotionally Abused: Not on file    Physically Abused: Not on file    Sexually Abused: Not on file   Housing Stability:     Unable to Pay for Housing in the Last Year: Not on file    Number of [place] in the Last Year: Not on file    Unstable Housing in the Last Year: Not on file         Allergies:  No Known Allergies       LAB DATA:    CBC:   Lab Results   Component Value Date    WBC 4.0 01/11/2022    RBC 3.24 01/11/2022    HGB 10.3 01/11/2022    HCT 30.7 01/11/2022    MCV 94.8 01/11/2022    MCH 31.8 01/11/2022    MCHC 33.5 01/11/2022    RDW 14.7 01/11/2022     01/11/2022    MPV 8.5 01/11/2022     BMP:    Lab Results   Component Value Date     01/11/2022    K 3.8 01/11/2022    CL 99 01/11/2022    CO2 17 01/11/2022    BUN 40 01/11/2022    CREATININE 2.1 01/11/2022    CALCIUM 8.9 01/11/2022    GFRAA 29 01/11/2022    GFRAA 60 05/23/2013    LABGLOM 24 01/11/2022    GLUCOSE 134 01/11/2022     Ionized Calcium:  No results found for: IONCA  Magnesium:    Lab Results   Component Value Date    MG 2.10 10/23/2021     Phosphorus:    Lab Results   Component Value Date    PHOS 2.2 10/23/2021     U/A:    Lab Results   Component Value Date    COLORU Yellow 11/26/2021    PHUR 5.0 11/26/2021    LABCAST 20-40 Hyaline 07/06/2017    WBCUA 15 11/26/2021    RBCUA 3 11/26/2021    MUCUS 1+ 05/23/2013    YEAST Rare Yeast 05/14/2012    BACTERIA 1+ 11/26/2021    CLARITYU Clear 11/26/2021    SPECGRAV 1.025 11/26/2021    LEUKOCYTESUR Negative 11/26/2021    UROBILINOGEN 0.2 11/26/2021    BILIRUBINUR Negative 11/26/2021    BILIRUBINUR NEGATIVE 05/14/2012    BLOODU TRACE-INTACT 11/26/2021    GLUCOSEU Negative 11/26/2021    GLUCOSEU NEGATIVE 05/14/2012    AMORPHOUS Rare 12/11/2014         IMPRESSION/RECOMMENDATIONS:      Active Problems:    Chest pain    Moderate malnutrition ([address]  Resolved Problems:    * No resolved hospital problems. *    1. KOLBY - likely hemodynamic in nature due to rapid correction of BP and diuretic use  - Permissive hypertension recommended at this time  - reduced Metoprolol and Amlodipine dose in half  - Agree with holding Torsemide which was given yesterday  - Agree with IVF but increase rate and change composition  - check urine lytes  - ordered U/S  - avoid exposure to Nephrotoxic agents  - RRT not indicated at this point in time    2. Acidosis - added HCO to IVF    3. Chest pain - plan per Cardiology/Medicine    4.  Left Second Toe discoloration - pt reports presence for 1-2 months- plan per Vascular    5. HTN - modifications made to antihypertensive regimen as mentioned in # 1    6.  Anemia - check Iron; B12; Folate

## 2022-01-11 NOTE — PROGRESS NOTES
Patient requesting medication for anxiety. Secure message sent to Dr. Sixto Lizama. Waiting for response.

## 2022-01-11 NOTE — PLAN OF CARE
Problem: Falls - Risk of:  Goal: Will remain free from falls  Description: Will remain free from falls  1/11/2022 0112 by Nhi Gilmore RN  Outcome: Ongoing  Note: Patient educated on fall prevention. Call light is within reach, bed locked in lowest position, personal items within reach, and bed alarm is on. Will round on patient per unit guidelines. 1/10/2022 1239 by Cata Alicea RN  Outcome: Ongoing  Note: Fall risk assessment completed. Fall precautions in place. Call light within reach. Pt educated on calling for assistance before getting up. Walkway free of clutter. Will continue to monitor. Electronically signed by Cata Alicea RN on 1/10/2022 at 12:39 PM   Goal: Absence of physical injury  Description: Absence of physical injury  Outcome: Ongoing  Note: Pt is free of injury. No injury noted. Fall precautions in place. Call light within reach. Will monitor. Problem: Pain:  Goal: Pain level will decrease  Description: Pain level will decrease  1/11/2022 0112 by Nhi Gilmore RN  Outcome: Ongoing  Note: Pain /discomfort being managed with PRN analgesics per MD orders, rest, distraction, emotional support. Patient able to express presence and absence of pain and rate pain appropriately using numerical scale. 1/10/2022 1239 by Cata Alicea RN  Outcome: Ongoing  Note: Educated patient on pain management. Will assess patients pain level per unit protocol, and provide pain management measures as needed. Electronically signed by Cata Alicea RN on 1/10/2022 at 12:39 PM   Goal: Control of acute pain  Description: Control of acute pain  Outcome: Ongoing  Note: Pain /discomfort being managed with PRN analgesics per MD orders, rest, distraction, emotional support. Patient able to express presence and absence of pain and rate pain appropriately using numerical scale.    Goal: Control of chronic pain  Description: Control of chronic pain  Outcome: Ongoing  Note: Pain /discomfort being managed with PRN analgesics per MD orders, rest, distraction, emotional support. Patient able to express presence and absence of pain and rate pain appropriately using numerical scale. Problem: Nutrition  Goal: Optimal nutrition therapy  1/11/2022 0112 by Mylene Bowden RN  Outcome: Ongoing  Note: Patient educated on proper nutrition. Patients intake monitored and patient assessed to make sure no assistance with eating was required. Patient encouraged to eat a well balanced diet.      1/10/2022 1518 by Satinder Fitzgerald RD, LD  Outcome: Ongoing

## 2022-01-11 NOTE — PLAN OF CARE
Problem: Falls - Risk of:  Goal: Will remain free from falls  Description: Will remain free from falls  1/11/2022 1125 by Kin Handy RN  Outcome: Ongoing  Note: Fall risk assessment completed. Fall precautions in place. Call light within reach. Pt educated on calling for assistance before getting up. Walkway free of clutter. Will continue to monitor. Electronically signed by Kin Handy RN on 1/11/2022 at 11:25 AM      Problem: Pain:  Goal: Pain level will decrease  Description: Pain level will decrease  1/11/2022 1125 by Kin Handy RN  Outcome: Ongoing  Note: Educated patient on pain management. Will assess patients pain level per unit protocol, and provide pain management measures as needed.    Electronically signed by Kin Handy RN on 1/11/2022 at 11:25 AM

## 2022-01-12 ENCOUNTER — HOSPITAL ENCOUNTER (OUTPATIENT)
Dept: PHYSICAL THERAPY | Age: 67
Setting detail: THERAPIES SERIES
Discharge: HOME | End: 2022-01-12

## 2022-01-12 LAB
ALBUMIN SERPL-MCNC: 3.5 G/DL (ref 3.4–5)
ANION GAP SERPL CALCULATED.3IONS-SCNC: 15 MMOL/L (ref 3–16)
BUN BLDV-MCNC: 43 MG/DL (ref 7–20)
CALCIUM SERPL-MCNC: 8.6 MG/DL (ref 8.3–10.6)
CHLORIDE BLD-SCNC: 97 MMOL/L (ref 99–110)
CO2: 22 MMOL/L (ref 21–32)
CREAT SERPL-MCNC: 1.7 MG/DL (ref 0.6–1.2)
GFR AFRICAN AMERICAN: 36
GFR NON-AFRICAN AMERICAN: 30
GLUCOSE BLD-MCNC: 116 MG/DL (ref 70–99)
GLUCOSE BLD-MCNC: 118 MG/DL (ref 70–99)
GLUCOSE BLD-MCNC: 120 MG/DL (ref 70–99)
GLUCOSE BLD-MCNC: 149 MG/DL (ref 70–99)
GLUCOSE BLD-MCNC: 155 MG/DL (ref 70–99)
HCT VFR BLD CALC: 28 % (ref 36–48)
HEMOGLOBIN: 9.1 G/DL (ref 12–16)
MAGNESIUM: 1.8 MG/DL (ref 1.8–2.4)
MCH RBC QN AUTO: 31.6 PG (ref 26–34)
MCHC RBC AUTO-ENTMCNC: 32.7 G/DL (ref 31–36)
MCV RBC AUTO: 96.6 FL (ref 80–100)
PDW BLD-RTO: 14.3 % (ref 12.4–15.4)
PERFORMED ON: ABNORMAL
PHOSPHORUS: 4.6 MG/DL (ref 2.5–4.9)
PLATELET # BLD: 176 K/UL (ref 135–450)
PMV BLD AUTO: 8 FL (ref 5–10.5)
POTASSIUM SERPL-SCNC: 4.1 MMOL/L (ref 3.5–5.1)
RBC # BLD: 2.9 M/UL (ref 4–5.2)
SODIUM BLD-SCNC: 134 MMOL/L (ref 136–145)
URIC ACID, SERUM: 4.8 MG/DL (ref 2.6–6)
WBC # BLD: 3.6 K/UL (ref 4–11)

## 2022-01-12 PROCEDURE — 84550 ASSAY OF BLOOD/URIC ACID: CPT

## 2022-01-12 PROCEDURE — 1200000000 HC SEMI PRIVATE

## 2022-01-12 PROCEDURE — 83735 ASSAY OF MAGNESIUM: CPT

## 2022-01-12 PROCEDURE — 97530 THERAPEUTIC ACTIVITIES: CPT

## 2022-01-12 PROCEDURE — 80069 RENAL FUNCTION PANEL: CPT

## 2022-01-12 PROCEDURE — 2500000003 HC RX 250 WO HCPCS: Performed by: INTERNAL MEDICINE

## 2022-01-12 PROCEDURE — 6370000000 HC RX 637 (ALT 250 FOR IP): Performed by: INTERNAL MEDICINE

## 2022-01-12 PROCEDURE — 85027 COMPLETE CBC AUTOMATED: CPT

## 2022-01-12 PROCEDURE — 2580000003 HC RX 258: Performed by: INTERNAL MEDICINE

## 2022-01-12 PROCEDURE — APPSS15 APP SPLIT SHARED TIME 0-15 MINUTES: Performed by: NURSE PRACTITIONER

## 2022-01-12 PROCEDURE — 97116 GAIT TRAINING THERAPY: CPT

## 2022-01-12 PROCEDURE — 6360000002 HC RX W HCPCS: Performed by: INTERNAL MEDICINE

## 2022-01-12 PROCEDURE — 6370000000 HC RX 637 (ALT 250 FOR IP): Performed by: PODIATRIST

## 2022-01-12 PROCEDURE — 99233 SBSQ HOSP IP/OBS HIGH 50: CPT | Performed by: SURGERY

## 2022-01-12 PROCEDURE — 94761 N-INVAS EAR/PLS OXIMETRY MLT: CPT

## 2022-01-12 PROCEDURE — APPNB30 APP NON BILLABLE TIME 0-30 MINS: Performed by: NURSE PRACTITIONER

## 2022-01-12 PROCEDURE — 36415 COLL VENOUS BLD VENIPUNCTURE: CPT

## 2022-01-12 RX ORDER — NITROGLYCERIN 2.5 MG/D
1 PATCH TRANSDERMAL EVERY 24 HOURS
Status: DISCONTINUED | OUTPATIENT
Start: 2022-01-12 | End: 2022-01-13 | Stop reason: HOSPADM

## 2022-01-12 RX ADMIN — ATORVASTATIN CALCIUM 80 MG: 80 TABLET, FILM COATED ORAL at 19:48

## 2022-01-12 RX ADMIN — ENOXAPARIN SODIUM 30 MG: 100 INJECTION SUBCUTANEOUS at 08:46

## 2022-01-12 RX ADMIN — DULOXETINE HYDROCHLORIDE 60 MG: 60 CAPSULE, DELAYED RELEASE ORAL at 08:46

## 2022-01-12 RX ADMIN — Medication 10 ML: at 19:47

## 2022-01-12 RX ADMIN — MORPHINE SULFATE 1 MG: 2 INJECTION, SOLUTION INTRAMUSCULAR; INTRAVENOUS at 03:54

## 2022-01-12 RX ADMIN — INSULIN GLARGINE 8 UNITS: 100 INJECTION, SOLUTION SUBCUTANEOUS at 10:12

## 2022-01-12 RX ADMIN — QUETIAPINE FUMARATE 25 MG: 25 TABLET ORAL at 19:49

## 2022-01-12 RX ADMIN — ACETAMINOPHEN 650 MG: 325 TABLET ORAL at 02:14

## 2022-01-12 RX ADMIN — ASPIRIN 81 MG 81 MG: 81 TABLET ORAL at 08:47

## 2022-01-12 RX ADMIN — MORPHINE SULFATE 1 MG: 2 INJECTION, SOLUTION INTRAMUSCULAR; INTRAVENOUS at 17:43

## 2022-01-12 RX ADMIN — OXYCODONE AND ACETAMINOPHEN 1 TABLET: 7.5; 325 TABLET ORAL at 05:54

## 2022-01-12 RX ADMIN — PANTOPRAZOLE SODIUM 40 MG: 40 TABLET, DELAYED RELEASE ORAL at 05:55

## 2022-01-12 RX ADMIN — Medication: at 22:23

## 2022-01-12 RX ADMIN — DICYCLOMINE HYDROCHLORIDE 20 MG: 20 TABLET ORAL at 19:48

## 2022-01-12 RX ADMIN — TIZANIDINE 2 MG: 4 TABLET ORAL at 19:46

## 2022-01-12 RX ADMIN — RANOLAZINE 1000 MG: 500 TABLET, FILM COATED, EXTENDED RELEASE ORAL at 19:49

## 2022-01-12 RX ADMIN — METOPROLOL SUCCINATE 25 MG: 25 TABLET, EXTENDED RELEASE ORAL at 19:49

## 2022-01-12 RX ADMIN — CLOPIDOGREL BISULFATE 75 MG: 75 TABLET, FILM COATED ORAL at 08:46

## 2022-01-12 RX ADMIN — DICYCLOMINE HYDROCHLORIDE 20 MG: 20 TABLET ORAL at 16:42

## 2022-01-12 RX ADMIN — MORPHINE SULFATE 1 MG: 2 INJECTION, SOLUTION INTRAMUSCULAR; INTRAVENOUS at 23:46

## 2022-01-12 RX ADMIN — ISOSORBIDE MONONITRATE 30 MG: 30 TABLET, EXTENDED RELEASE ORAL at 08:47

## 2022-01-12 RX ADMIN — DICYCLOMINE HYDROCHLORIDE 20 MG: 20 TABLET ORAL at 10:11

## 2022-01-12 RX ADMIN — INSULIN LISPRO 1 UNITS: 100 INJECTION, SOLUTION INTRAVENOUS; SUBCUTANEOUS at 21:47

## 2022-01-12 RX ADMIN — DICYCLOMINE HYDROCHLORIDE 20 MG: 20 TABLET ORAL at 05:56

## 2022-01-12 RX ADMIN — OXYCODONE AND ACETAMINOPHEN 1 TABLET: 7.5; 325 TABLET ORAL at 19:47

## 2022-01-12 RX ADMIN — Medication: at 05:48

## 2022-01-12 RX ADMIN — INSULIN LISPRO 2 UNITS: 100 INJECTION, SOLUTION INTRAVENOUS; SUBCUTANEOUS at 12:47

## 2022-01-12 RX ADMIN — INSULIN GLARGINE 8 UNITS: 100 INJECTION, SOLUTION SUBCUTANEOUS at 21:48

## 2022-01-12 RX ADMIN — RANOLAZINE 1000 MG: 500 TABLET, FILM COATED, EXTENDED RELEASE ORAL at 08:46

## 2022-01-12 ASSESSMENT — PAIN DESCRIPTION - ONSET
ONSET: GRADUAL
ONSET: ON-GOING
ONSET: GRADUAL
ONSET: ON-GOING

## 2022-01-12 ASSESSMENT — PAIN DESCRIPTION - DIRECTION
RADIATING_TOWARDS: BACK
RADIATING_TOWARDS: BACK
RADIATING_TOWARDS: UPPER BACK
RADIATING_TOWARDS: BACK

## 2022-01-12 ASSESSMENT — PAIN DESCRIPTION - PROGRESSION
CLINICAL_PROGRESSION: GRADUALLY WORSENING
CLINICAL_PROGRESSION: GRADUALLY IMPROVING
CLINICAL_PROGRESSION: GRADUALLY IMPROVING
CLINICAL_PROGRESSION: GRADUALLY WORSENING
CLINICAL_PROGRESSION: GRADUALLY IMPROVING
CLINICAL_PROGRESSION: GRADUALLY IMPROVING

## 2022-01-12 ASSESSMENT — PAIN SCALES - GENERAL
PAINLEVEL_OUTOF10: 10
PAINLEVEL_OUTOF10: 10
PAINLEVEL_OUTOF10: 0
PAINLEVEL_OUTOF10: 4
PAINLEVEL_OUTOF10: 6
PAINLEVEL_OUTOF10: 8
PAINLEVEL_OUTOF10: 5
PAINLEVEL_OUTOF10: 6
PAINLEVEL_OUTOF10: 4

## 2022-01-12 ASSESSMENT — PAIN - FUNCTIONAL ASSESSMENT
PAIN_FUNCTIONAL_ASSESSMENT: PREVENTS OR INTERFERES SOME ACTIVE ACTIVITIES AND ADLS

## 2022-01-12 ASSESSMENT — PAIN DESCRIPTION - LOCATION
LOCATION: HEAD
LOCATION: HEAD
LOCATION: CHEST
LOCATION: HEAD
LOCATION: CHEST
LOCATION: CHEST

## 2022-01-12 ASSESSMENT — PAIN DESCRIPTION - DESCRIPTORS
DESCRIPTORS: ACHING

## 2022-01-12 ASSESSMENT — PAIN DESCRIPTION - PAIN TYPE
TYPE: ACUTE PAIN

## 2022-01-12 ASSESSMENT — PAIN DESCRIPTION - FREQUENCY
FREQUENCY: CONTINUOUS
FREQUENCY: INTERMITTENT
FREQUENCY: INTERMITTENT
FREQUENCY: CONTINUOUS
FREQUENCY: INTERMITTENT
FREQUENCY: INTERMITTENT
FREQUENCY: CONTINUOUS
FREQUENCY: CONTINUOUS
FREQUENCY: INTERMITTENT
FREQUENCY: CONTINUOUS

## 2022-01-12 ASSESSMENT — PAIN DESCRIPTION - ORIENTATION
ORIENTATION: LEFT;ANTERIOR
ORIENTATION: LEFT;ANTERIOR
ORIENTATION: LEFT
ORIENTATION: MID
ORIENTATION: LEFT;ANTERIOR
ORIENTATION: MID;UPPER
ORIENTATION: MID
ORIENTATION: LEFT
ORIENTATION: MID

## 2022-01-12 NOTE — CARE COORDINATION
Amara/Pierce received referral from PT for [address]. Will need DME Orders. Will verify patient's insurance and follow up with patient to deliver the ordered item(s) prior to discharge.     Thank you for the referral.  Electronically signed by Bobbi Rutherford on 1/12/2022 at 4:00 PM  Cell ph# [phone]

## 2022-01-12 NOTE — ACP (ADVANCE CARE PLANNING)
orders.     Length of ACP Conversation in minutes:      Conversation Outcomes:  [x] ACP discussion completed  [] Existing advance directive reviewed with patient; no changes to patient's previously recorded wishes  [] New Advance Directive completed  [] Portable Do Not Rescitate prepared for Provider review and signature  [] POLST/POST/MOLST/MOST prepared for Provider review and signature      Follow-up plan:    [] Schedule follow-up conversation to continue planning  [] Referred individual to Provider for additional questions/concerns   [] Advised patient/agent/surrogate to review completed ACP document and update if needed with changes in condition, patient preferences or care setting    [x] This note routed to one or more involved healthcare providers        Electronically signed by Jannet Heredia on 1/12/2022 at 10:56 AM   #[phone]

## 2022-01-12 NOTE — PROGRESS NOTES
Physical Therapy    Facility/Department: [address] ORTHOPEDICS  Daily Treatment Note    This note serves as patient discharge summary if pt discharges prior to next PT visit      NAME: Lenny Srivastava  : 1955  MRN: [id number]    Date of Service: 2022    Discharge Recommendations:  Continue to assess pending progress,Home with assist PRN   Lenny Srivastava scored a 21/24 on the AM-PAC short mobility form. PT Equipment Recommendations  Equipment Needed: Yes  Mobility Devices: Barnetta Mulligan: Rolling  Other: GEETA RW    Assessment   Body structures, Functions, Activity limitations: Decreased functional mobility ; Increased pain;Decreased strength;Decreased safe awareness  Assessment: per H and P:  \"Patient is a 42-year-old female with past medical history of atrial fibrillation CAD, COPD diabetes mellitus who presents to the hospital for chest pain. \"  Caridology consulted. PMHx also includes R TIA with weakness, CKD lll, fibromyalgia. Prior Status:  Lives alone in senior apt with level entry and elevator. Independent in transfers and apartment mobility with cane as needed. Has home aide 5 hours/day, 5 days a week for ADLs, IADLs. [employer] on [employer]. Status 2022:  Bed mobility Mod Indep. Transfers Supervision. Gait 48' with str cane with CGA. Gait quality Fair, with one loss of balance, and wide base of support. Gait with RW 50' with SBA and improved gait quality. Recommend GEETA HEIGHT rolling walker for home. Anticipate progression of function to point that returning to home with assist as prior will be realistic. Treatment Diagnosis: Impaired activity tolerance. Prognosis: Good  History: as noted  PT Education: Goals;PT Role;Plan of Care;Transfer Training;Equipment; Functional Mobility Training;Gait Training  Patient Education: Rationale for use of RW over cane. Patient acknowledges.   REQUIRES PT FOLLOW UP: Yes  Activity Tolerance  Activity Tolerance: Patient Tolerated treatment well       Patient Diagnosis(es): The primary encounter diagnosis was Chest pain, unspecified type. A diagnosis of History of coronary artery disease was also pertinent to this visit. has a past medical history of Acid reflux, Anemia, Anxiety and depression, Arthritis, Asthma, Atrial fibrillation ([address] CAD (coronary artery disease), Cerebral artery occlusion with cerebral infarction ([address] CHF (congestive heart failure) ([address] Chronic kidney disease--stage III, COPD (chronic obstructive pulmonary disease) ([address] DM2 (diabetes mellitus, type 2) ([address] Dysarthria, Fibromyalgia, Headache(784.0), Hemisensory loss, History of blood transfusion, Hyperlipidemia, Hypertension, IBS (irritable bowel syndrome), Inferior vena cava occlusion ([address] Keratitis, MI, old, Neuropathy, Superior vena cava obstruction, Temporal arteritis ([address] and Wears glasses. has a past surgical history that includes Tunneled venous port placement; Cholecystectomy; ablation of dysrhythmic focus (1999  and 11/2020); Cataract removal (Bilateral); joint replacement (Right); Hysterectomy; Artery Biopsy (Right, 04/23/2014); Coronary angioplasty with stent (2020); Knee arthroscopy (Right); Percutaneous Transluminal Coronary Angio (10/2019); Upper gastrointestinal endoscopy (N/A, 7/6/2020); Carotid artery surgery (Left); Colonoscopy (N/A, 4/9/2021); and Colonoscopy (N/A, 10/15/2021). Restrictions  Restrictions/Precautions  Restrictions/Precautions: Fall Risk  Position Activity Restriction  Other position/activity restrictions: telemetry, 1500mL FR, CCx4, low sodium, potassium, phosphorus diet     Vision/Hearing  Vision: Impaired  Vision Exceptions: Wears glasses at all times  Hearing: Within functional limits       Subjective  General  Chart Reviewed: Yes  Additional Pertinent Hx: per H and P:  \"Patient is a 71-year-old female with past medical history of atrial fibrillation CAD, COPD diabetes mellitus who presents to the hospital for chest pain. Supine: Modified independent  Transfers  Sit to Stand: Supervision  Stand to sit: Supervision  Ambulation  Ambulation?: Yes  More Ambulation?: Yes  Ambulation 1  Surface: level tile  Device: Single point cane (In R UE, as patient states she uses at home.)  Assistance: Contact guard assistance  Quality of Gait: Step through pattern. Decreased stance time R LE. Deviated gait path, wide base of support. 1 LOB posteriorly and laterally with CGA to recover. Gait Deviations: Slow Marianela  Distance: 50'  Ambulation 2  Surface - 2: level tile  Device 2: Rolling Walker (requires GEETA HEIGHT RW.)  Assistance 2: Stand by assistance  Quality of Gait 2: Step through pattern, more fluid gait. Requires cues for proper positioning within RW, and continuous use of RW until fully aligned to sitting surface. Distance: 50'  Balance  Posture: Good  Sitting - Static: Good  Sitting - Dynamic: Good  Standing - Static: Good  Standing - Dynamic: Good (at RW. Fair+ at [employer] cane)        Plan   Plan  Times per week: 3-5  Current Treatment Recommendations: Functional Mobility Training,Transfer Training,Gait Training,Safety Education & Training  Safety Devices  Type of devices: Bed alarm in place,Call light within reach,Gait belt,Left in bed,Nurse notified (YOLETTE Toney informed)    AM-PAC Score  AM-PAC Inpatient Mobility Raw Score : 21 (01/12/22 1500)  AM-PAC Inpatient T-Scale Score : 50.25 (01/12/22 1500)  Mobility Inpatient CMS 0-100% Score: 28.97 (01/12/22 1500)  Mobility Inpatient CMS G-Code Modifier : CJ (01/12/22 1500)    Goals  Short term goals  Time Frame for Short term goals: By acute DC 1- all goals ongoing  Short term goal 1: Bed Mobility Indep  Short term goal 2: Transfers Supervision  Short term goal 3: Gait with LRAD 100' Supervision  Patient Goals   Patient goals : \"To feel better. \"       Therapy Time   Individual Concurrent Group Co-treatment   Time In 1440         Time Out 1503         Minutes 23            Gt 15; TA [facility] Mary Batista  Electronically signed by [facility], [address] [place] [address] Drive [phone])  on 1/12/2022 at 3:31 PM

## 2022-01-12 NOTE — PROGRESS NOTES
[facility]  Progress Note    1/12/2022 8:23 AM    Chief Complaint: Chest Pain    Reason for Consult: Left Second Toe Discoloration    Subjective: Pt seen resting in bed, reports continued discomfort to Left Second toe, states she feels her toe is getting \"worse\"    Vital Signs:  Vitals:    01/12/22 0019 01/12/22 0353 01/12/22 0555 01/12/22 0755   BP: (!) 108/54 135/65  (!) 115/54   Pulse: 55 62  55   Resp: 15 15  18   Temp: 97.4 °F (36.3 °C) 97.8 °F (36.6 °C)  98.3 °F (36.8 °C)   TempSrc: Axillary Oral  Oral   SpO2: 98% 97%  99%   Weight:   111 lb 8.8 oz (50.6 kg)    Height:           I/O:    Intake/Output Summary (Last 24 hours) at 1/12/2022 3991  Last data filed at 1/12/2022 0654  Gross per 24 hour   Intake 1623.03 ml   Output 700 ml   Net 923.03 ml       Physical Exam:   General: no apparent distress, alert and oriented x3, afebrile  Chest/Lungs: no accessory muscle use  Cardiac: regular rate and rhythm  Abdomen: soft, non-tender, non-distended  Extremities: warm and well perfused, no signs of cyanosis or ischemia, no significant edema, bilateral upper and lower extremity motorsensory intact, darkened discoloration to dorsal distal half of Left Second Toe with reported decreased sensation, plantar surface of toe appears similar to other toes - toe with no significant changes from yesterday's exam  Pulses:  -L DP: +2 palp  Wounds:   -Left Second Toe - darkened discoloration, skin intact, no skin breakdown    Labs:   Lab Results   Component Value Date     01/12/2022    K 4.1 01/12/2022    K 3.8 01/11/2022    CL 97 01/12/2022    CO2 22 01/12/2022    BUN 43 01/12/2022    CREATININE 1.7 01/12/2022    GFRAA 36 01/12/2022    GFRAA 60 05/23/2013    LABGLOM 30 01/12/2022    GLUCOSE 118 01/12/2022    PHOS 4.6 01/12/2022    MG 1.80 01/12/2022    CALCIUM 8.6 01/12/2022     Lab Results   Component Value Date    WBC 3.6 01/12/2022    RBC 2.90 01/12/2022    HGB 9.1 01/12/2022    HCT 28.0 concerns were addressed with the patient. I have discussed the above stated plan with patient. The patient verbalized understanding and agreed with the plan. Thank you for allowing to us to participate in the care of [address]      Electronically signed by CYRUS Manning - CNP on 1/12/2022 at 8:23 AM   Pt seen and examined. for DIAGNOSIS OF discoloration left second toe agree with CYRUS Manning's note. Labs reviewed. Vitals   Vitals:    01/12/22 0019 01/12/22 0353 01/12/22 0555 01/12/22 0755   BP: (!) 108/54 135/65  (!) 115/54   Pulse: 55 62  55   Resp: 15 15  18   Temp: 97.4 °F (36.3 °C) 97.8 °F (36.6 °C)  98.3 °F (36.8 °C)   TempSrc: Axillary Oral  Oral   SpO2: 98% 97%  99%   Weight:   111 lb 8.8 oz (50.6 kg)    Height:             On exam she still has a palpable dorsalis pedis pulse warm foot the plantar surface of the second toe looks normal dorsal surface looks abnormal kind of dry and scaly scabbed looking. Patient still says it is very painful. Echo and arterial duplex did not show source of emboli. Not 100% convinced that this is an embolic event but it is hard to rule it out. We will get podiatry consult to get their opinion. But what I recommended is just discharging her from our standpoint and less renal needs to keep her in longer and following this to see if it demarcates or not.   Told  that the worse scenario would be , losing the toe but that this would not affect her walking and she should heal it easily with her good circulation

## 2022-01-12 NOTE — PROGRESS NOTES
Patient complaining of chest pain 8/10 that radiates to back. Requesting morphine. BP: 158/69 Pulse: 61. O2: 98% Room Air. Morphine administered. See MAR. Dr. David Harmon made aware at this time.        Electronically signed by Osmin Lara RN on 1/12/2022 at 5:53 PM

## 2022-01-12 NOTE — PLAN OF CARE
Problem: Falls - Risk of:  Goal: Will remain free from falls  Description: Will remain free from falls  1/11/2022 2330 by Ru Flores RN  Outcome: Ongoing  Note: Fall risk assessment completed every shift. All precautions in place. Pt has call light within reach at all times. Room clear of clutter. Pt aware to call for assistance when getting up. Problem: Falls - Risk of:  Goal: Absence of physical injury  Description: Absence of physical injury  1/11/2022 2330 by Ru Flores RN  Outcome: Ongoing  Note: Patient remains free from physical injury. Patient educated on safety precautions. Will continue to monitor to ensure patient remains free from physical injury throughout remainder of shift. Problem: Pain:  Goal: Pain level will decrease  Description: Pain level will decrease  1/11/2022 2330 by Ru Flores RN  Outcome: Ongoing  Note: Pt educated to attempt non-phagological method of pain control, but it it becomes too strong use PRN analgesics. Pain and discomfort being managed PRN analgesics per MD orders. Pt able to express presence of pain. Problem: Pain:  Goal: Control of acute pain  Description: Control of acute pain  1/11/2022 2330 by Ru Flores RN  Outcome: Ongoing  Note: Patient educated on acute pain. Taught patient to use call light to ask for pain medication. PRN pain medication given for acute pain. Will continue to monitor pain per unit protocol. Problem: Pain:  Goal: Control of chronic pain  Description: Control of chronic pain  1/11/2022 2330 by Ru Flores RN  Outcome: Ongoing  Note: Patient educated on chronic pain. Taught patient to use call light to ask for pain medication. PRN pain medication given for chronic pain. Will continue to monitor pain per unit protocol. Problem: Nutrition  Goal: Optimal nutrition therapy  1/11/2022 2330 by Ru Flores RN  Outcome: Ongoing  Note: Patient educated on nutritional guidelines. Patient encouraged to eat a well balanced diet.

## 2022-01-12 NOTE — PLAN OF CARE
Problem: Falls - Risk of:  Goal: Will remain free from falls  Description: Will remain free from falls  1/12/2022 0742 by Arnold Nicole RN  Outcome: Ongoing  Note: Fall risk assessment completed. Fall precautions in place. Call light within reach. Pt educated on calling for assistance before getting up. Walkway free of clutter. Will continue to monitor. 1/11/2022 2330 by Lance Iyer RN  Outcome: Ongoing  Note: Fall risk assessment completed every shift. All precautions in place. Pt has call light within reach at all times. Room clear of clutter. Pt aware to call for assistance when getting up.    1/11/2022 2308 by Lance Iyer RN  Outcome: Ongoing  Note: Pt armband and slip resistance socks are on. Bed alarm activated. Side rails up. Bed in low position. Call light in reach. Goal: Absence of physical injury  Description: Absence of physical injury  1/12/2022 0742 by Arnold Nicole RN  Outcome: Ongoing  Note: Pt is free of injury. No injury noted. Fall precautions in place. Call light within reach. Will monitor. 1/11/2022 2330 by Lance Iyer RN  Outcome: Ongoing  Note: Patient remains free from physical injury. Patient educated on safety precautions. Will continue to monitor to ensure patient remains free from physical injury throughout remainder of shift. 1/11/2022 2308 by Lance Iyer RN  Outcome: Ongoing  Note: Patient remains free from physical injury. Patient educated on safety precautions. Will continue to monitor to ensure patient remains free from physical injury throughout remainder of shift. Problem: Pain:  Goal: Pain level will decrease  Description: Pain level will decrease  1/12/2022 0742 by Arnold Nicole RN  Outcome: Ongoing  Note: Pt assessed for pain. Pt denies any pain at this time. Will continue to monitor pt and assess for pain throughout rest of shift.    1/11/2022 2330 by Lance Iyer RN  Outcome: Ongoing  Note: Pt educated to attempt non-phagological method of pain control, but it it becomes too strong use PRN analgesics. Pain and discomfort being managed PRN analgesics per MD orders. Pt able to express presence of pain. 1/11/2022 2308 by Sandra Mata RN  Outcome: Ongoing  Note: Pt educated to attempt non-phagological method of pain control, but it it becomes too strong use PRN analgesics. Pain and discomfort being managed PRN analgesics per MD orders. Pt able to express presence of pain. Goal: Control of acute pain  Description: Control of acute pain  1/12/2022 0742 by Dorothy Jones RN  Outcome: Ongoing  Note: Pt assessed for pain. Pt denies any pain at this time. Will continue to monitor pt and assess for pain throughout rest of shift. 1/11/2022 2330 by Sandra Mata RN  Outcome: Ongoing  Note: Patient educated on acute pain. Taught patient to use call light to ask for pain medication. PRN pain medication given for acute pain. Will continue to monitor pain per unit protocol. 1/11/2022 2308 by Sandra Mata RN  Outcome: Ongoing  Note: Patient educated on acute pain. Taught patient to use call light to ask for pain medication. PRN pain medication given for acute pain. Will continue to monitor pain per unit protocol. Goal: Control of chronic pain  Description: Control of chronic pain  1/12/2022 0742 by Dorothy Jones RN  Outcome: Ongoing  Note: Pt assessed for pain. Pt denies any pain at this time. Will continue to monitor pt and assess for pain throughout rest of shift. 1/11/2022 2330 by Sandra Mata RN  Outcome: Ongoing  Note: Patient educated on chronic pain. Taught patient to use call light to ask for pain medication. PRN pain medication given for chronic pain. Will continue to monitor pain per unit protocol. 1/11/2022 2308 by Sandra Mata RN  Outcome: Ongoing  Note: Pain/discomfort being managed with PRN analgesics per MD orders.  Pt able to express presence and absence of pain and rate pain appropriately using numerical scale. Problem: Nutrition  Goal: Optimal nutrition therapy  1/12/2022 0742 by Reymundo Denny RN  Outcome: Ongoing  Note: Patient receiving optimal nutrition therapy. Will continue to monitor. 1/11/2022 2330 by Ru Flores RN  Outcome: Ongoing  Note: Patient educated on nutritional guidelines. Patient encouraged to eat a well balanced diet. 1/11/2022 2308 by Ru Flores RN  Outcome: Ongoing  Note: Patient educated on nutritional guidelines. Patient encouraged to eat a well balanced diet.

## 2022-01-12 NOTE — PLAN OF CARE
Problem: Falls - Risk of:  Goal: Will remain free from falls  Description: Will remain free from falls  1/11/2022 2308 by Shauna Haddad RN  Outcome: Ongoing  Note: Pt armband and slip resistance socks are on. Bed alarm activated. Side rails up. Bed in low position. Call light in reach. Problem: Falls - Risk of:  Goal: Absence of physical injury  Description: Absence of physical injury  Outcome: Ongoing  Note: Patient remains free from physical injury. Patient educated on safety precautions. Will continue to monitor to ensure patient remains free from physical injury throughout remainder of shift. Problem: Pain:  Goal: Pain level will decrease  Description: Pain level will decrease  1/11/2022 2308 by Shauna Haddad RN  Outcome: Ongoing  Note: Pt educated to attempt non-phagological method of pain control, but it it becomes too strong use PRN analgesics. Pain and discomfort being managed PRN analgesics per MD orders. Pt able to express presence of pain. Problem: Pain:  Goal: Control of acute pain  Description: Control of acute pain  Outcome: Ongoing  Note: Patient educated on acute pain. Taught patient to use call light to ask for pain medication. PRN pain medication given for acute pain. Will continue to monitor pain per unit protocol. Problem: Pain:  Goal: Control of chronic pain  Description: Control of chronic pain  Outcome: Ongoing  Note: Pain/discomfort being managed with PRN analgesics per MD orders. Pt able to express presence and absence of pain and rate pain appropriately using numerical scale. Problem: Nutrition  Goal: Optimal nutrition therapy  Outcome: Ongoing  Note: Patient educated on nutritional guidelines. Patient encouraged to eat a well balanced diet.

## 2022-01-12 NOTE — PROGRESS NOTES
Morning dose of amlodipine held for BP: 115/54 and HR: 55. Dr. Alonso Braden made aware at this time via [employer].      Electronically signed by Lisbeth Richardson RN on 1/12/2022 at 10:24 AM

## 2022-01-12 NOTE — PROGRESS NOTES
This RN removed ace wrap around IV site on left AC. Swelling decreased. Patient satisfied.      Electronically signed by Jose Samayoa RN on 1/12/2022 at 5:15 PM

## 2022-01-12 NOTE — PROGRESS NOTES
Patient medicated with  MD ordered percocet for complaints of lt chest pain radiating to upper back. Assessment done and charted. Will continue to monitor.

## 2022-01-12 NOTE — PROGRESS NOTES
Wasted 1 mg(0.5 mL)  of morphine per protocol with Dio Hilario RN in [facility] after administering 1 mg(0.5 mL) per order.  See Mar.     Electronically signed by Joseph Coats RN on 1/12/2022 at 6:17 PM  Electronically signed by Yulisa South RN on 1/12/2022 at 6:19 PM

## 2022-01-12 NOTE — PROGRESS NOTES
Department of Internal Medicine  Nephrology Progress Note    74 yo female with a past medical history significant for A-fib, CAD, TIA, CHF, COPD, CKD 3, DM II, HLD, HTN, IBS, Cardiac Ablations, Left Carotid Endarterectomy, Coronary Stents, and Multiple Port Placements that presented with Chest pain radiating to her left shoulder and back (she has been seen by Cardiology who feel it is non cardiac in nature) - she also has discoloration in 2nd toe of left foot and Vascular is evaluating that. She denies NSAID use. She has not had recent IV contrast exposure - Creatinine has been trending up from 1.2 to its current value of 2.1 mg/dl. Events noted , labs reviewed . Over all feels better . REVIEW OF SYSTEMS:  No CP/SOB, some KELLER . Poor appetite     Family at bed side     Physical Exam:    VITALS:  BP (!) 115/54   Pulse 55   Temp 98.3 °F (36.8 °C) (Oral)   Resp 18   Ht 5' (1.524 m)   Wt 111 lb 8.8 oz (50.6 kg)   SpO2 99%   BMI 21.79 kg/m²   24HR INTAKE/OUTPUT:    Intake/Output Summary (Last 24 hours) at 1/12/2022 1128  Last data filed at 1/12/2022 0654  Gross per 24 hour   Intake 1503.03 ml   Output 700 ml   Net 803.03 ml       Constitutional:  Doing well  Respiratory:  Few rhonchi   Gastrointestinal:  No  tenderness.   Normal Bowel Sounds  Cardiovascular:  S1, S2 RRR   Edema:  +  edema    DATA:    CBC:  Lab Results   Component Value Date    WBC 3.6 01/12/2022    RBC 2.90 01/12/2022    HGB 9.1 01/12/2022    HCT 28.0 01/12/2022    MCV 96.6 01/12/2022    MCH 31.6 01/12/2022    MCHC 32.7 01/12/2022    RDW 14.3 01/12/2022     01/12/2022    MPV 8.0 01/12/2022     CMP:  Lab Results   Component Value Date     01/12/2022    K 4.1 01/12/2022    K 3.8 01/11/2022    CL 97 01/12/2022    CO2 22 01/12/2022    BUN 43 01/12/2022    CREATININE 1.7 01/12/2022    GFRAA 36 01/12/2022    GFRAA 60 05/23/2013    AGRATIO 0.9 01/09/2022    LABGLOM 30 01/12/2022    GLUCOSE 118 01/12/2022    PROT 8.1 01/09/2022    PROT

## 2022-01-12 NOTE — CARE COORDINATION
INITIAL CASE MANAGEMENT ASSESSMENT    Reviewed chart, met with patient to assess possible discharge needs. Explained Case Management role/services. Living Situation: lives alone in a senior apartment building w/ an elevator    ADLs: fairly independent-- has an aide form [employer] that assists with homemaking tasks     DME: cane,[employer] button,shower seat    PT/OT Recs: pending     Active Services: active with [employer] services--aide 5 days/week for 5 hours/day  Also has MOW   is Jaya # [phone]- through [employer] and left message to confirm services-- sue await return call    Also states she was active w/ [facility] for PT/OT    Transportation: [employer] assists with transport-- pt states her family should be able to assist with transport home at dc      Medications: no issues--uses [facility]    PCP: Dr Ebonie Kyle      HD/PD: n/a    PLAN/COMMENTS: patient plans to return home at [place] and resume care with [facility] and [facility]    Notified Scarlet Douglas w/ [address]- will follow    SW/CM provided contact information for patient or family to call with any questions. SW/CM will follow and assist as needed. Electronically signed by Jessica Tamayo on 1/12/2022 at 11:04 AM  [phone]    1/12 4:37 PM received call from [employer]  # [phone] --confirms pt is active with their services- requests we call her w/ DC date and fax DC summary to fax# [phone].   Electronically signed by Jessica Tamayo on 1/12/2022 at 4:39 PM  #[phone]

## 2022-01-12 NOTE — PROGRESS NOTES
Hospitalist Progress Note      PCP: Shalom Wu MD    Date of Admission: 1/9/2022        Subjective: Feels okay, denies chest pain this morning no nausea vomiting or abdominal pain. Medications:  Reviewed    Infusion Medications    sodium bicarbonate infusion 125 mL/hr at 01/12/22 0548    sodium chloride      dextrose      dextrose       Scheduled Medications    amLODIPine  5 mg Oral Daily    metoprolol succinate  25 mg Oral Nightly    enoxaparin  30 mg SubCUTAneous Daily    sodium chloride flush  10 mL IntraVENous 2 times per day    aspirin  81 mg Oral Daily    atorvastatin  80 mg Oral Nightly    clopidogrel  75 mg Oral Daily    dicyclomine  20 mg Oral 4x Daily AC & HS    DULoxetine  60 mg Oral Daily    insulin glargine  8 Units SubCUTAneous BID    isosorbide mononitrate  30 mg Oral Daily    linaclotide  145 mcg Oral QAM AC    pantoprazole  40 mg Oral QAM AC    QUEtiapine  25 mg Oral Nightly    ranolazine  1,000 mg Oral BID    [Held by provider] torsemide  10 mg Oral Daily    insulin lispro  0-12 Units SubCUTAneous TID WC    insulin lispro  0-6 Units SubCUTAneous Nightly     PRN Meds: sodium chloride flush, sodium chloride, promethazine **OR** ondansetron, acetaminophen **OR** acetaminophen, glucose, dextrose, glucagon (rDNA), dextrose, albuterol, oxyCODONE-acetaminophen, tiZANidine, glucose, dextrose, glucagon (rDNA), dextrose, morphine      Intake/Output Summary (Last 24 hours) at 1/12/2022 0750  Last data filed at 1/12/2022 0654  Gross per 24 hour   Intake 2103.03 ml   Output 700 ml   Net 1403.03 ml       Physical Exam Performed:    /65   Pulse 62   Temp 97.8 °F (36.6 °C) (Oral)   Resp 15   Ht 5' (1.524 m)   Wt 111 lb 8.8 oz (50.6 kg)   SpO2 97%   BMI 21.79 kg/m²     General appearance: No apparent distress  Neck: Supple  Respiratory:  Normal respiratory effort. Clear to auscultation, bilaterally without Rales/Wheezes/Rhonchi.   Cardiovascular: Regular rate and rhythm with normal S1/S2 without murmurs, rubs or gallops. Abdomen: Soft, non-tender, non-distended with normal bowel sounds. Musculoskeletal: No clubbing, cyanosis. Discoloration of second left toe no changes  Skin: As above  Neurologic: Moving all extremities spontaneously  Psychiatric: Alert and oriented  Capillary Refill: Brisk,3 seconds, normal   Peripheral Pulses: +2 palpable, equal bilaterally       Labs:   Recent Labs     01/10/22  0427 01/11/22  0758 01/12/22  0505   WBC 3.9* 4.0 3.6*   HGB 10.7* 10.3* 9.1*   HCT 32.5* 30.7* 28.0*    177 176     Recent Labs     01/10/22  0427 01/11/22  0758 01/12/22  0505    132* 134*   K 3.8 3.8 4.1    99 97*   CO2 19* 17* 22   BUN 26* 40* 43*   CREATININE 1.7* 2.1* 1.7*   CALCIUM 9.0 8.9 8.6   PHOS  --   --  4.6     Recent Labs     01/09/22  1233   AST 23   ALT 13   BILITOT 0.4   ALKPHOS 120     No results for input(s): INR in the last 72 hours. Recent Labs     01/09/22  1233 01/09/22  2156 01/10/22  0427   TROPONINI <0.01 <0.01 <0.01       Urinalysis:      Lab Results   Component Value Date    NITRU Negative 01/11/2022    WBCUA 5 01/11/2022    BACTERIA 1+ 01/11/2022    RBCUA 1 01/11/2022    BLOODU Negative 01/11/2022    SPECGRAV 1.016 01/11/2022    GLUCOSEU Negative 01/11/2022    GLUCOSEU NEGATIVE 05/14/2012       Radiology:  US RENAL COMPLETE   Final Result   Limited study. No hydronephrosis or acute process. VL DUP LOWER EXTREMITY ARTERIES BILATERAL   Final Result      XR CHEST (2 VW)   Final Result   1. No acute abnormality. Assessment/Plan:    Active Hospital Problems    Diagnosis     Moderate malnutrition ([facility] [address] [E44.0]     Chest pain [R07.9]      1.  Chest pain, monitor troponin, telemetry monitoring, cardiology consulted on admission.  On aspirin and Plavix, no further work-up recommended per cardiology  2.  Diabetes mellitus, sliding scale  3.  COPD, no acute exacerbation  4.  A. fib, controlled.   Echo noted  5.  Acute kidney injury, monitor closely, improved creatinine to 1.7 this morning. 6.  Anemia, appears chronic, follow-up with PCP for further work-up  7. Peripheral vascular disease with discoloration of second left toe, vascular surgery consulted, Doppler noted with no significant occlusive disease, echo ordered per vascular and noted    Diet: ADULT DIET; Regular; 4 carb choices (60 gm/meal); Low Sodium (2 gm); Low Potassium (Less than 3000 mg/day);  Low Phosphorus (Less than 1000 mg); 1500 ml  ADULT ORAL NUTRITION SUPPLEMENT; Lunch, Dinner; Clear Liquid Oral Supplement  Code Status: Full Code      Yancy Pantoja MD

## 2022-01-12 NOTE — PROGRESS NOTES
Occupational Therapy  Facility/Department: [address] ORTHOPEDICS  Daily Treatment Note  NAME: Carlos Benitez  : 1955  MRN: [id number]    Date of Service: 2022    Discharge Recommendations:  Home with assist PRALEXANDERS Level 1  OT Equipment Recommendations  Other: encouraged pt to wear lifeline AATs, have cell phone to call neighbor for emergencies  Carlos Benitez scored a 21/24 on the AM-PAC ADL Inpatient form. Current research shows that an AM-PAC score of 18 or greater is typically associated with a discharge to the patient's home setting. Based on the patient's AM-PAC score, and their current ADL deficits, it is recommended that the patient have 2-3 sessions per week of Occupational Therapy at d/c to increase the patient's independence. At this time, this patient demonstrates the endurance and safety to discharge home with home OT services and a follow up treatment frequency of 2-3x/wk. Please see assessment section for further patient specific details. If patient discharges prior to next session this note will serve as a discharge summary. Please see below for the latest assessment towards goals. Assessment   Performance deficits / Impairments: Decreased functional mobility ; Decreased endurance;Decreased ADL status; Decreased high-level IADLs;Decreased balance;Decreased posture  Assessment: 72 yo female admitted for Chest and R sided neck pain and KOLBY. PMH: neuropathy, PVD, CAD, CHF, HTN, DM, TIA (R side weaker), AFib, CKD, IBS, Fibromyalgia. PTA, pt lives alone with assist from aide for IADLs, otherwise, pt independent with ADL and fxl mobility. Today, pt functioning near baseline limited by fatigue and back pain. Pt completes bed mobility IND'ly, and bed t/f with SBA using RW. She tried using cane vs RW from bed<>hallway @ 50 ft, required CGA d/t posterior misstep/slight LOB when using cane; less assistance when using RW however required cues for safety especially during turns & backing up. Issued UE HEP for strengthening using yellow resistance band. Pt declines ADL needs, anticipate SBA/SUP LB and UB ADL based on balance, endurance, pain. OT provided set up for sponge bathing & grooming tasks for next session. Anticipate if progresses to Mod I, able to d/c home to daily assist from aide for IADLs when medically stable  Treatment Diagnosis: impaired ADL/fxl mobility  Prognosis: Good  Decision Making: Medium Complexity  OT Education: Energy Conservation;Equipment;Home Exercise Program;Transfer Training  Patient Education: instructed w/ UE HEP for strengthening; educated on remaining seated for ADLs to conserve energy; keep lifeline on AATs in case of emergency, use of RW for safe transfers  REQUIRES OT FOLLOW UP: Yes  Activity Tolerance  Activity Tolerance: Patient Tolerated treatment well  Activity Tolerance: reported back pain w/ ambulation  Safety Devices  Safety Devices in place: Yes  Type of devices: Nurse notified;Gait belt;Call light within reach; Left in bed         Patient Diagnosis(es): The primary encounter diagnosis was Chest pain, unspecified type. A diagnosis of History of coronary artery disease was also pertinent to this visit. has a past medical history of Acid reflux, Anemia, Anxiety and depression, Arthritis, Asthma, Atrial fibrillation ([address] CAD (coronary artery disease), Cerebral artery occlusion with cerebral infarction ([address] CHF (congestive heart failure) ([address] Chronic kidney disease--stage III, COPD (chronic obstructive pulmonary disease) ([address] DM2 (diabetes mellitus, type 2) ([address] Dysarthria, Fibromyalgia, Headache(784.0), Hemisensory loss, History of blood transfusion, Hyperlipidemia, Hypertension, IBS (irritable bowel syndrome), Inferior vena cava occlusion ([address] Keratitis, MI, old, Neuropathy, Superior vena cava obstruction, Temporal arteritis ([address] and Wears glasses. has a past surgical history that includes Tunneled venous port placement;  Cholecystectomy; ablation of dysrhythmic focus (1999  and 11/2020); Cataract removal (Bilateral); joint replacement (Right); Hysterectomy; Artery Biopsy (Right, 04/23/2014); Coronary angioplasty with stent (2020); Knee arthroscopy (Right); Percutaneous Transluminal Coronary Angio (10/2019); Upper gastrointestinal endoscopy (N/A, 7/6/2020); Carotid artery surgery (Left); Colonoscopy (N/A, 4/9/2021); and Colonoscopy (N/A, 10/15/2021). Restrictions  Restrictions/Precautions  Restrictions/Precautions: Fall Risk  Position Activity Restriction  Other position/activity restrictions: telemetry, 1500mL FR, CCx4, low sodium, potassium, phosphorus diet  Subjective   General  Chart Reviewed: Yes  Patient assessed for rehabilitation services?: Yes  Additional Pertinent Hx: 74 yo female admitted for Chest and R sided neck pain and KOLBY.  PMH: neuropathy, PVD, CAD, CHF, HTN, DM, TIA (R side weaker), AFib, CKD, IBS, Fibromyalgia  Family / Caregiver Present: No  Referring Practitioner: Otto Wilson MD  Diagnosis: Chest pain  Subjective  Subjective: Pt resting in bed upon arrival, reporting mild neck & chest \"discomfort\"  General Comment  Comments: agreeable for OT/PT fxl activities (this is OT's 2nd attempt to see pt today)      Orientation  Orientation  Overall Orientation Status: Within Normal Limits  Objective    ADL  Feeding: Independent  Grooming: Setup  LE Dressing: Independent (able to put on non skid socks)  Additional Comments: Pt declines ADL needs (OT provided set up in bathrm), anticipate SBA/SUP LB and UB ADL based on pain level        Balance  Sitting Balance: Modified independent   Standing Balance: Contact guard assistance  Standing Balance  Time: @ 3 minutes  Activity: during fxl mob  Comment: using RW w/ close SB/CGA  Functional Mobility  Functional - Mobility Device: Rolling Walker  Activity: Other  Assist Level: Contact guard assistance  Functional Mobility Comments: pt tried using cane for ambulation in room<>hallway, mild posterior LOB/misstep & unsteadiness.  Tried using RW w/ close SB, less CGA but required cues to keep RW w/ her thru entire transfer  Wheelchair Bed Transfers  Wheelchair/Bed - Technique: Ambulating  Equipment Used: Bed  Level of Asssistance: Supervision;Stand by assistance  Wheelchair Transfers Comments: using RW  Bed mobility  Supine to Sit: Modified independent  Sit to Supine: Modified independent  Transfers  Sit to stand: Stand by assistance  Stand to sit: Stand by assistance  Transfer Comments: RW. cues for hand placement (OT offered to assist pt into bathrm or toilet but declined); she was agreeable to ambulate @ room<>hallway @ 50 ft, first w/ cane & then RW                       Cognition  Overall Cognitive Status: WNL                    Type of ROM/Therapeutic Exercise  Type of ROM/Therapeutic Exercise: Resistive Bands  Comment: issued UE HEP for strengthening using yellow resistance band                    Plan   Plan  Times per week: 2-3  Current Treatment Recommendations: Strengthening,Positioning,Patient/Caregiver Education & Training,Endurance Training,ROM,Pain Management,Balance Training,Safety Education & Training,Self-Care / ADL    AM-[facility] Score        -[facility] Inpatient Daily Activity Raw Score: 21 (01/12/22 1618)  AM-PAC Inpatient ADL T-Scale Score : 44.27 (01/12/22 1618)  ADL Inpatient CMS 0-100% Score: 32.79 (01/12/22 1618)  ADL Inpatient CMS G-Code Modifier : Reynold Hard (01/12/22 1618)    Goals  Short term goals  Time Frame for Short term goals: prior to d/c  Short term goal 1: toileting Mod I  Short term goal 2: LB dressing Mod I  Short term goal 3: UB bathing/dressing Mod I  Short term goal 4: Tolerate 5 min fxl standing task Mod I  Short term goal 5: BUE exercises in all planes (gentle R neck/shoulder) to improve ROM, strength and endurance for ADL and tx  Patient Goals   Patient goals : improve pain       Therapy Time   Individual Concurrent Group Co-treatment   Time In 6652         Time Out 4758 Minutes 13         Timed Code Treatment Minutes: [address] [place], [place] [id number]

## 2022-01-12 NOTE — PROGRESS NOTES
Patient resting in bed and is A&O x4. Assessment completed and medication administered. See Mar. Patient tolerating PO intake. Denies nausea or vomiting. IV infusing into left AC IV. Left Arm trace swelling. Patient educated about elevating on pillow to reduce swelling. 2nd toe on left foot black. Patient denies numbness or tingling in arms or legs at this time. Patient denies any additional requests at this time. Fall precautions in place, call light within reach, and bedside table nearby. Will continue to monitor and round on patient q 2 hours.        Electronically signed by David Rouse RN on 1/12/2022 at 10:21 AM

## 2022-01-13 VITALS
DIASTOLIC BLOOD PRESSURE: 65 MMHG | WEIGHT: 115.52 LBS | RESPIRATION RATE: 16 BRPM | OXYGEN SATURATION: 96 % | TEMPERATURE: 97.5 F | HEIGHT: 60 IN | SYSTOLIC BLOOD PRESSURE: 147 MMHG | BODY MASS INDEX: 22.68 KG/M2 | HEART RATE: 63 BPM

## 2022-01-13 LAB
ALBUMIN SERPL-MCNC: 3.3 G/DL (ref 3.4–5)
ANION GAP SERPL CALCULATED.3IONS-SCNC: 12 MMOL/L (ref 3–16)
BUN BLDV-MCNC: 35 MG/DL (ref 7–20)
CALCIUM SERPL-MCNC: 8.8 MG/DL (ref 8.3–10.6)
CHLORIDE BLD-SCNC: 99 MMOL/L (ref 99–110)
CO2: 22 MMOL/L (ref 21–32)
CREAT SERPL-MCNC: 1.4 MG/DL (ref 0.6–1.2)
GFR AFRICAN AMERICAN: 46
GFR NON-AFRICAN AMERICAN: 38
GLUCOSE BLD-MCNC: 102 MG/DL (ref 70–99)
GLUCOSE BLD-MCNC: 118 MG/DL (ref 70–99)
GLUCOSE BLD-MCNC: 90 MG/DL (ref 70–99)
HCT VFR BLD CALC: 27.2 % (ref 36–48)
HEMOGLOBIN: 9.1 G/DL (ref 12–16)
MAGNESIUM: 1.8 MG/DL (ref 1.8–2.4)
MCH RBC QN AUTO: 32.1 PG (ref 26–34)
MCHC RBC AUTO-ENTMCNC: 33.4 G/DL (ref 31–36)
MCV RBC AUTO: 96 FL (ref 80–100)
PDW BLD-RTO: 14.5 % (ref 12.4–15.4)
PERFORMED ON: ABNORMAL
PERFORMED ON: ABNORMAL
PHOSPHORUS: 3.3 MG/DL (ref 2.5–4.9)
PLATELET # BLD: 159 K/UL (ref 135–450)
PMV BLD AUTO: 8.3 FL (ref 5–10.5)
POTASSIUM SERPL-SCNC: 4.1 MMOL/L (ref 3.5–5.1)
PRO-BNP: 1338 PG/ML (ref 0–124)
RBC # BLD: 2.83 M/UL (ref 4–5.2)
SODIUM BLD-SCNC: 133 MMOL/L (ref 136–145)
URIC ACID, SERUM: 4.6 MG/DL (ref 2.6–6)
WBC # BLD: 3.6 K/UL (ref 4–11)

## 2022-01-13 PROCEDURE — 85027 COMPLETE CBC AUTOMATED: CPT

## 2022-01-13 PROCEDURE — 97530 THERAPEUTIC ACTIVITIES: CPT

## 2022-01-13 PROCEDURE — 84550 ASSAY OF BLOOD/URIC ACID: CPT

## 2022-01-13 PROCEDURE — 6360000002 HC RX W HCPCS: Performed by: INTERNAL MEDICINE

## 2022-01-13 PROCEDURE — 6370000000 HC RX 637 (ALT 250 FOR IP): Performed by: INTERNAL MEDICINE

## 2022-01-13 PROCEDURE — 80069 RENAL FUNCTION PANEL: CPT

## 2022-01-13 PROCEDURE — 99233 SBSQ HOSP IP/OBS HIGH 50: CPT | Performed by: SURGERY

## 2022-01-13 PROCEDURE — 83735 ASSAY OF MAGNESIUM: CPT

## 2022-01-13 PROCEDURE — 36415 COLL VENOUS BLD VENIPUNCTURE: CPT

## 2022-01-13 PROCEDURE — 97535 SELF CARE MNGMENT TRAINING: CPT

## 2022-01-13 PROCEDURE — 2580000003 HC RX 258: Performed by: INTERNAL MEDICINE

## 2022-01-13 PROCEDURE — 83880 ASSAY OF NATRIURETIC PEPTIDE: CPT

## 2022-01-13 RX ORDER — AMLODIPINE BESYLATE 5 MG/1
5 TABLET ORAL DAILY
Qty: 30 TABLET | Refills: 0 | Status: SHIPPED | OUTPATIENT
Start: 2022-01-13 | End: 2022-01-27 | Stop reason: SDUPTHER

## 2022-01-13 RX ORDER — NITROGLYCERIN 2.5 MG/D
1 PATCH TRANSDERMAL EVERY 24 HOURS
Qty: 30 PATCH | Refills: 0 | Status: SHIPPED | OUTPATIENT
Start: 2022-01-13 | End: 2022-01-27 | Stop reason: SDUPTHER

## 2022-01-13 RX ADMIN — INSULIN GLARGINE 8 UNITS: 100 INJECTION, SOLUTION SUBCUTANEOUS at 08:07

## 2022-01-13 RX ADMIN — CLOPIDOGREL BISULFATE 75 MG: 75 TABLET, FILM COATED ORAL at 08:02

## 2022-01-13 RX ADMIN — ASPIRIN 81 MG 81 MG: 81 TABLET ORAL at 08:03

## 2022-01-13 RX ADMIN — ACETAMINOPHEN 650 MG: 325 TABLET ORAL at 10:43

## 2022-01-13 RX ADMIN — ENOXAPARIN SODIUM 30 MG: 100 INJECTION SUBCUTANEOUS at 08:02

## 2022-01-13 RX ADMIN — DULOXETINE HYDROCHLORIDE 60 MG: 60 CAPSULE, DELAYED RELEASE ORAL at 08:02

## 2022-01-13 RX ADMIN — ISOSORBIDE MONONITRATE 30 MG: 30 TABLET, EXTENDED RELEASE ORAL at 08:02

## 2022-01-13 RX ADMIN — RANOLAZINE 1000 MG: 500 TABLET, FILM COATED, EXTENDED RELEASE ORAL at 08:02

## 2022-01-13 RX ADMIN — DICYCLOMINE HYDROCHLORIDE 20 MG: 20 TABLET ORAL at 10:43

## 2022-01-13 RX ADMIN — OXYCODONE AND ACETAMINOPHEN 1 TABLET: 7.5; 325 TABLET ORAL at 02:05

## 2022-01-13 RX ADMIN — DICYCLOMINE HYDROCHLORIDE 20 MG: 20 TABLET ORAL at 05:27

## 2022-01-13 RX ADMIN — PROMETHAZINE HYDROCHLORIDE 12.5 MG: 25 TABLET ORAL at 08:03

## 2022-01-13 RX ADMIN — Medication 10 ML: at 08:06

## 2022-01-13 RX ADMIN — PANTOPRAZOLE SODIUM 40 MG: 40 TABLET, DELAYED RELEASE ORAL at 05:27

## 2022-01-13 RX ADMIN — AMLODIPINE BESYLATE 5 MG: 5 TABLET ORAL at 08:02

## 2022-01-13 ASSESSMENT — PAIN DESCRIPTION - ORIENTATION
ORIENTATION: LEFT

## 2022-01-13 ASSESSMENT — PAIN DESCRIPTION - PAIN TYPE
TYPE: ACUTE PAIN

## 2022-01-13 ASSESSMENT — PAIN SCALES - GENERAL
PAINLEVEL_OUTOF10: 0
PAINLEVEL_OUTOF10: 7
PAINLEVEL_OUTOF10: 8
PAINLEVEL_OUTOF10: 8
PAINLEVEL_OUTOF10: 4

## 2022-01-13 ASSESSMENT — PAIN - FUNCTIONAL ASSESSMENT
PAIN_FUNCTIONAL_ASSESSMENT: PREVENTS OR INTERFERES SOME ACTIVE ACTIVITIES AND ADLS

## 2022-01-13 ASSESSMENT — PAIN DESCRIPTION - LOCATION
LOCATION: TOE (COMMENT WHICH ONE)
LOCATION: TOE (COMMENT WHICH ONE);OTHER (COMMENT)

## 2022-01-13 ASSESSMENT — PAIN DESCRIPTION - FREQUENCY
FREQUENCY: CONTINUOUS

## 2022-01-13 ASSESSMENT — PAIN DESCRIPTION - DESCRIPTORS
DESCRIPTORS: ACHING

## 2022-01-13 ASSESSMENT — PAIN DESCRIPTION - ONSET
ONSET: ON-GOING
ONSET: GRADUAL

## 2022-01-13 ASSESSMENT — PAIN DESCRIPTION - PROGRESSION
CLINICAL_PROGRESSION: GRADUALLY WORSENING
CLINICAL_PROGRESSION: GRADUALLY IMPROVING
CLINICAL_PROGRESSION: GRADUALLY IMPROVING

## 2022-01-13 ASSESSMENT — PAIN DESCRIPTION - DIRECTION: RADIATING_TOWARDS: BACK

## 2022-01-13 NOTE — PROGRESS NOTES
Pt discharged to home. Transported by PCA. Transported in personal vehicle. Discharge instructions, follow up information, Rx, and personal belongings given to pt. Explanation of discharge medications and instructions understood by verbal statement. No questions, comments or concerns at this time.        Electronically signed by Maria M Rivera RN on 1/13/2022 at 2:25 PM

## 2022-01-13 NOTE — DISCHARGE SUMMARY
Hospital Medicine Discharge Summary    Patient ID: Hector Bateman      Patient's PCP: Nan Owens MD    Admit Date: 1/9/2022     Discharge Date:   01/13/2022    Admitting Provider: Shannon Chapman MD     Discharge Provider: Elsi Cabrera MD     Discharge Diagnoses: Active Hospital Problems    Diagnosis     Moderate malnutrition ([facility] [address] [E44.0]     Chest pain [R07.9]        The patient was seen and examined on day of discharge and this discharge summary is in conjunction with any daily progress note from day of discharge. Hospital Course:     From HPI:\"Patient is a 43-year-old female with past medical history of atrial fibrillation CAD, COPD diabetes mellitus who presents to the hospital for chest pain. According to patient she has chest pain, substernal, 7/10 intensity, which is started while she was sleeping at 7 AM in the morning, mentions it radiates from her left shoulder, associated with some shortness of breath, 7/10 intensity. Mentions she had similar episodes of chest pains in the past when she had to get stents placed mentions she has 6-7 stents. Otherwise denied nausea vomiting diarrhea constipation dysuria. \"      1.  Chest pain, monitor troponin, telemetry monitoring, cardiology consulted on admission.  On aspirin and Plavix, no further work-up recommended per cardiology  2.  Diabetes mellitus, sliding scale  3.  COPD, no acute exacerbation  4.  A. fib, controlled. Echo noted, discussed with Dr. Aneesh Lopez, at this time we will keep on aspirin and Plavix, she will follow-up with him as outpatient. 5.  Acute kidney injury, monitor closely, improved creatinine to 1.4 this morning.   6.  Anemia, appears chronic, follow-up with PCP for further work-up  7. Peripheral vascular disease with discoloration of second left toe, vascular surgery consulted, Doppler noted with no significant occlusive disease, echo ordered per vascular and noted also podiatry consulted, follow-up as outpatient. Physical Exam Performed:     BP (!) 153/67   Pulse 60   Temp 97.5 °F (36.4 °C) (Oral)   Resp 16   Ht 5' (1.524 m)   Wt 115 lb 8.3 oz (52.4 kg)   SpO2 93%   BMI 22.56 kg/m²       General appearance:  No apparent distress  HEENT:  Normal cephalic  Neck: Supple  Respiratory:  Normal respiratory effort. Clear to auscultation, bilaterally without Rales/Wheezes/Rhonchi. Cardiovascular:  Regular rate and rhythm with normal S1/S2 without murmurs, rubs or gallops. Abdomen: Soft, non-tender, non-distended  Musculoskeletal:  No clubbing, cyanosis   Skin: Stable bluish discoloration of second left  Neurologic:  No focal weakness   Psychiatric:  Alert and oriented  Capillary Refill: Brisk,< 3 seconds   Peripheral Pulses: +2 palpable, equal bilaterally       Labs: For convenience and continuity at follow-up the following most recent labs are provided:      CBC:    Lab Results   Component Value Date    WBC 3.6 01/13/2022    HGB 9.1 01/13/2022    HCT 27.2 01/13/2022     01/13/2022       Renal:    Lab Results   Component Value Date     01/13/2022    K 4.1 01/13/2022    K 3.8 01/11/2022    CL 99 01/13/2022    CO2 22 01/13/2022    BUN 35 01/13/2022    CREATININE 1.4 01/13/2022    CALCIUM 8.8 01/13/2022    PHOS 3.3 01/13/2022         Significant Diagnostic Studies    Radiology:   US RENAL COMPLETE   Final Result   Limited study. No hydronephrosis or acute process. VL DUP LOWER EXTREMITY ARTERIES BILATERAL   Final Result      XR CHEST (2 VW)   Final Result   1. No acute abnormality.                 Consults:     IP CONSULT TO PHARMACY  IP CONSULT TO SPIRITUAL SERVICES  IP CONSULT TO CARDIOLOGY  IP CONSULT TO VASCULAR SURGERY  IP CONSULT TO NEPHROLOGY  IP CONSULT TO PODIATRY    Disposition:  home     Condition at Discharge: Stable    Discharge Instructions/Follow-up:  PCP, CARDIOLOGY, NEPHROLOGY, PODIATRY     Code Status:  Full Code     Activity: activity as tolerated    Diet: cardiac diet and diabetic diet      Discharge Medications:     Current Discharge Medication List           Details   nitroGLYCERIN (NITRODUR) 0.1 MG/HR Place 1 patch onto the skin every 24 hours  Qty: 30 patch, Refills: 0              Details   amLODIPine (NORVASC) 5 MG tablet Take 1 tablet by mouth daily  Qty: 30 tablet, Refills: 0              Details   isosorbide mononitrate (IMDUR) 60 MG extended release tablet Take 60 mg by mouth daily      docusate sodium (COLACE) 100 MG capsule Take 100 mg by mouth 2 times daily      Erenumab-aooe (AIMOVIG) 70 MG/ML SOAJ INJECT 140 MLS INTO THE SKIN EVERY 30 DAYS  Qty: 1 mL, Refills: 0    Associated Diagnoses: Chronic pain syndrome      tiZANidine (ZANAFLEX) 4 MG tablet Take 0.5-1 tablets by mouth 2 times daily as needed (muscle spasms)  Qty: 60 tablet, Refills: 1    Associated Diagnoses: Chronic pain syndrome; DDD (degenerative disc disease), lumbar; Fibromyalgia; DDD (degenerative disc disease), cervical; Tendinitis of right rotator cuff; Chronic painful diabetic neuropathy (HCC)      DULoxetine (CYMBALTA) 60 MG extended release capsule Take 1 capsule by mouth daily  Qty: 30 capsule, Refills: 1    Associated Diagnoses: Chronic pain syndrome; Chronic painful diabetic neuropathy (HCC)      Ubrogepant (UBRELVY) 50 MG TABS Take 1 tablet po PRN at start of headaches, can repeat in 24 hours  Qty: 10 tablet, Refills: 1    Associated Diagnoses: Chronic pain syndrome      QUEtiapine (SEROQUEL) 25 MG tablet Take 1 tablet by mouth nightly  Qty: 60 tablet, Refills: 1    Associated Diagnoses: Chronic pain syndrome      atorvastatin (LIPITOR) 80 MG tablet TAKE 1 TABLET BY MOUTH NIGHTLY  Qty: 90 tablet, Refills: 5    Associated Diagnoses: Coronary artery disease involving native coronary artery of native heart without angina pectoris;  Uncontrolled type 2 diabetes mellitus with complication, with long-term current use of insulin (HCC)      clopidogrel (PLAVIX) 75 MG tablet TAKE ONE TABLET BY MOUTH EVERY DAY  Qty: 90 tablet, Refills: 5    Associated Diagnoses: Coronary artery disease involving native coronary artery of native heart without angina pectoris      ranolazine (RANEXA) 1000 MG extended release tablet Take 1 tablet by mouth 2 times daily  Qty: 60 tablet, Refills: 8    Associated Diagnoses: Coronary artery disease involving native coronary artery of native heart without angina pectoris      linaclotide (LINZESS) 145 MCG capsule Take 1 capsule by mouth every morning (before breakfast)  Qty: 30 capsule, Refills: 5      insulin glargine (BASAGLAR KWIKPEN) 100 UNIT/ML injection pen Inject 8 Units into the skin 2 times daily  Qty: 5 pen, Refills: 3      aspirin 81 MG chewable tablet Take 1 tablet by mouth daily  Qty: 30 tablet, Refills: 3      albuterol (PROVENTIL) (2.5 MG/3ML) 0.083% nebulizer solution Take 3 mLs by nebulization every 4 hours as needed for Wheezing  Qty: 120 each, Refills: 5    Associated Diagnoses: COPD with acute bronchitis (HCC)      albuterol sulfate HFA (VENTOLIN HFA) 108 (90 Base) MCG/ACT inhaler Inhale 2 puffs into the lungs every 4 hours as needed for Wheezing or Shortness of Breath  Qty: 3 Inhaler, Refills: 5    Associated Diagnoses: COPD with acute bronchitis (HCC)      metoprolol succinate (TOPROL XL) 50 MG extended release tablet Take 50 mg by mouth nightly      [employer] PEN NEEDLES 31G X 6 MM MISC USE WITH INSULINS FOUR TIMES A DAY  Qty: 100 each, Refills: 0      alirocumab (PRALUENT) 75 MG/ML SOAJ injection pen Inject 1 mL into the skin every 14 days  Qty: 6 pen, Refills: 3      omeprazole (PRILOSEC) 20 MG delayed release capsule TAKE 1 CAPSULE BY MOUTH DAILY  Qty: 30 capsule, Refills: 1    Associated Diagnoses: Essential hypertension      nitroGLYCERIN (NITROSTAT) 0.4 MG SL tablet Place 1 tablet under the tongue every 5 minutes as needed for Chest pain up to max of 3 total doses. If no relief after 1 dose, call 911.   Qty: 25 tablet, Refills: 3    Associated Diagnoses: Coronary artery disease involving native coronary artery of native heart without angina pectoris             Time Spent on discharge is more than 45 minutes in the examination, evaluation, counseling and review of medications and discharge plan. Signed:    Juju Gaston MD   1/13/2022      Thank you Janet Delacruz MD for the opportunity to be involved in this patient's care. If you have any questions or concerns please feel free to contact me at [phone].

## 2022-01-13 NOTE — CARE COORDINATION
[facility]    Referral received from  to follow for home care services. I will follow for needs, and speak with patient to verify demos.     Luz Elena Hernandez RN, BSN CTN  [facility] [phone]

## 2022-01-13 NOTE — CARE COORDINATION
[facility]    DC order noted, all docs needed have been faxed to [facility] for home care services.     Home care to see patient by 1/14-1/17    Karen Rebolledo RN, BSN CTN  [facility] [phone]

## 2022-01-13 NOTE — PROGRESS NOTES
Occupational Therapy  Facility/Department: [address] ORTHOPEDICS  Daily Treatment Note  NAME: Ailyn Alston  : 1955  MRN: [id number]    Date of Service: 2022    Discharge Recommendations:  Home with assist PRN,S Level 1     Ailyn Alston scored a 20/24 on the AM-PAC ADL Inpatient form. Current research shows that an AM-PAC score of 18 or greater is typically associated with a discharge to the patient's home setting. Based on the patient's AM-PAC score, and their current ADL deficits, it is recommended that the patient have 2-3 sessions per week of Occupational Therapy at d/c to increase the patient's independence. At this time, this patient demonstrates the endurance and safety to discharge home with home (home vs OP services) and a follow up treatment frequency of 2-3x/wk. Please see assessment section for further patient specific details. If patient discharges prior to next session this note will serve as a discharge summary. Please see below for the latest assessment towards goals. HOME HEALTH CARE: LEVEL 3 SAFETY        -Initial home health evaluation to occur within 24-48 hours, in patient home    -Home health agency to establish plan of care for patient over 60 day period    -Medication Reconciliation    -PT/OT/Speech evaluations in home within 24-48 hours of discharge; including  -DME and home safety    -Frontload therapy 5 days, then 3x a week    -OT to evaluate if patient has [address] needs for personal care    - evaluation within 24-48 hours, includes evaluation of resources   and insurance to determine [place], [place], LTC, and Medicaid options    -PCP Visit scheduled within three to seven days of discharge       Assessment: Discussed with OTR am pac score is 20. Anticipate that patient will be able to return home with family to provide PRN assist and home OT as well as prior assist from aide. Bed mobility with Mod I.  SBA for sit<>stand from EOB to RW to recliner chair to chair with arms with cues for hand placement. Functional mobility with RW with SBA, slow gait, slightly unsteady with noted LOB to the left. Complete sponge bathing with SBA seated in front of sink. Dressed lower body with SBA. Patient making progress towards goals. Cont with POC. Patient Diagnosis(es): The primary encounter diagnosis was Chest pain, unspecified type. A diagnosis of History of coronary artery disease was also pertinent to this visit. has a past medical history of Acid reflux, Anemia, Anxiety and depression, Arthritis, Asthma, Atrial fibrillation ([address] CAD (coronary artery disease), Cerebral artery occlusion with cerebral infarction ([address] CHF (congestive heart failure) ([address] Chronic kidney disease--stage III, COPD (chronic obstructive pulmonary disease) ([address] DM2 (diabetes mellitus, type 2) ([address] Dysarthria, Fibromyalgia, Headache(784.0), Hemisensory loss, History of blood transfusion, Hyperlipidemia, Hypertension, IBS (irritable bowel syndrome), Inferior vena cava occlusion ([address] Keratitis, MI, old, Neuropathy, Superior vena cava obstruction, Temporal arteritis ([address] and Wears glasses. has a past surgical history that includes Tunneled venous port placement; Cholecystectomy; ablation of dysrhythmic focus (1999  and 11/2020); Cataract removal (Bilateral); joint replacement (Right); Hysterectomy; Artery Biopsy (Right, 04/23/2014); Coronary angioplasty with stent (2020); Knee arthroscopy (Right); Percutaneous Transluminal Coronary Angio (10/2019); Upper gastrointestinal endoscopy (N/A, 7/6/2020); Carotid artery surgery (Left); Colonoscopy (N/A, 4/9/2021); and Colonoscopy (N/A, 10/15/2021).     Restrictions  Restrictions/Precautions  Restrictions/Precautions: Fall Risk  Position Activity Restriction  Other position/activity restrictions: telemetry, 1500mL FR, CCx4, low sodium, potassium, phosphorus diet  Subjective   General  Chart Reviewed: Yes  Patient assessed for mobility ; Decreased endurance;Decreased ADL status; Decreased high-level IADLs;Decreased balance;Decreased posture  Assessment: Discussed with OTR am pac score is 20. Anticipate that patient will be able to return home with family to provide PRN assist and home OT as well as prior assist from aide. Bed mobility with Mod I. SBA for sit<>stand from EOB to RW to recliner chair to chair with arms with cues for hand placement. Functional mobility with RW with SBA, slow gait, slightly unsteady with noted LOB to the left. Complete sponge bathing with SBA seated in front of sink. Dressed lower body with SBA. Patient making progress towards goals. Cont with POC. OT Education: OT Role;Plan of Care;ADL Adaptive Strategies;Transfer Training  REQUIRES OT FOLLOW UP: Yes  Activity Tolerance  Activity Tolerance: Patient Tolerated treatment well  Safety Devices  Safety Devices in place: Yes  Type of devices: Nurse notified;Gait belt;Call light within reach; Left in bed (YOLETTE Giles)        Plan   Plan  Times per week: 2-3  Current Treatment Recommendations: Strengthening,Positioning,Patient/Caregiver Education & Training,Endurance Training,ROM,Pain Management,Balance Training,Safety Education & Training,Self-Care / ADL    AM-PAC Score        AM-[facility] Inpatient Daily Activity Raw Score: 20 (01/13/22 1059)  AM-PAC Inpatient ADL T-Scale Score : 42.03 (01/13/22 1059)  ADL Inpatient CMS 0-100% Score: 38.32 (01/13/22 1059)  ADL Inpatient CMS G-Code Modifier : Junior Fatemeh (01/13/22 1059)    Goals  Short term goals  Time Frame for Short term goals: prior to d/c: all goals ongoing  Short term goal 1: toileting Mod I  Short term goal 2: LB dressing Mod I  Short term goal 3: UB bathing/dressing Mod I  Short term goal 4: Tolerate 5 min fxl standing task Mod I  Short term goal 5: BUE exercises in all planes (gentle R neck/shoulder) to improve ROM, strength and endurance for ADL and tx  Patient Goals   Patient goals : improve pain       Therapy Time Individual Concurrent Group Co-treatment   Time In 7545         Time Out 1110         Minutes 45                 Electronically signed by Rojas Mi [id number] on 1/13/2022 at 11:12 AM

## 2022-01-13 NOTE — PROGRESS NOTES
Patient complaining of that chest pain again and right 2nd toe pain. Requesting tylenol. BP: 147/65. Pulse: 63. Dr. Joya Aquino made aware.        Electronically signed by Joseph Andino RN on 1/13/2022 at 10:38 AM

## 2022-01-13 NOTE — PLAN OF CARE
Problem: Falls - Risk of:  Goal: Will remain free from falls  Description: Will remain free from falls  1/13/2022 0727 by Arnold Nicole RN  Outcome: Ongoing  Note: Fall risk assessment completed. Fall precautions in place. Call light within reach. Pt educated on calling for assistance before getting up. Walkway free of clutter. Will continue to monitor. 1/13/2022 0306 by Lance Iyer RN  Outcome: Ongoing  Note: Fall risk assessment completed . Fall precautions in place, bed/ chair alarm on, side rails 2/4 up, call light in reach, educated pt on calling for assistance when needed, room clear of clutter. Pt verbalized understanding. Goal: Absence of physical injury  Description: Absence of physical injury  1/13/2022 0727 by Arnold Niocle RN  Outcome: Ongoing  Note: Pt is free of injury. No injury noted. Fall precautions in place. Call light within reach. Will monitor. 1/13/2022 0306 by Lance Iyer RN  Outcome: Ongoing  Note: Patient remains free from physical injury. Patient educated on safety precautions. Will continue to monitor to ensure patient remains free from physical injury throughout remainder of shift. Problem: Pain:  Goal: Pain level will decrease  Description: Pain level will decrease  1/13/2022 0727 by Arnold Nicole RN  Outcome: Ongoing  Note: Pt assessed for pain. Pt denies any pain at this time. Will continue to monitor pt and assess for pain throughout rest of shift. 1/13/2022 0306 by Lance Iyer RN  Outcome: Ongoing  Note: Pt educated to attempt non-phagological method of pain control, but it it becomes too strong use PRN analgesics. Pain and discomfort being managed PRN analgesics per MD orders. Pt able to express presence of pain. Goal: Control of acute pain  Description: Control of acute pain  1/13/2022 0727 by Arnold Nicole RN  Outcome: Ongoing  Note: Pt assessed for pain. Pt denies any pain at this time.  Will continue to monitor pt and assess for pain throughout rest of shift. 1/13/2022 0306 by Milton Long RN  Outcome: Ongoing  Note: Patient educated on acute pain. Taught patient to use call light to ask for pain medication. PRN pain medication given for acute pain. Will continue to monitor pain per unit protocol. Goal: Control of chronic pain  Description: Control of chronic pain  1/13/2022 0727 by Darren Correia RN  Outcome: Ongoing  Note: Pt assessed for pain. Pt denies any pain at this time. Will continue to monitor pt and assess for pain throughout rest of shift. 1/13/2022 0306 by Milton Long RN  Outcome: Ongoing  Note: Patient educated on chronic pain. Taught patient to use call light to ask for pain medication. PRN pain medication given for chronic pain. Will continue to monitor pain per unit protocol. Problem: Nutrition  Goal: Optimal nutrition therapy  1/13/2022 0727 by Darren Correia RN  Outcome: Ongoing  1/13/2022 0306 by Milton Long RN  Outcome: Ongoing  Note: Patient educated on nutritional guidelines. Patient encouraged to eat a well balanced diet.

## 2022-01-13 NOTE — PROGRESS NOTES
Dr. Elana Pederson called this RN requesting this RN to call podiatry to verify if patient is okay for discharge. Per Dr. Evelyn Schwartz via phonecall, okay for discharge with order for nitro patches to left toe and follow up Tuesday 1/18/2022. Dr. Elana Pederson made aware at this time.      Electronically signed by Maria M Rivera RN on 1/13/2022 at 9:05 AM

## 2022-01-13 NOTE — CARE COORDINATION
[employer] rep delivered requested 2-Wheeled Walker [place] to patient and reviewed insurance coverage and equipment set up with patient. Notified RN.     Thank you for the referral.  Electronically signed by Juan Daniel Mccollum on 1/13/2022 at 11:12 AM Cell ph# [phone]

## 2022-01-13 NOTE — PROGRESS NOTES
[facility]  Progress Note    1/13/2022 11:36 AM    Chief Complaint: Chest Pain    Reason for Consult: Left Second Toe Discoloration    Subjective: Pt seen resting in her recliner, reports continued discomfort to Left Second toe, states she feels her toe is stable.   Tells me she is going home today  Vital Signs:  Vitals:    01/13/22 0453 01/13/22 0530 01/13/22 0706 01/13/22 1037   BP: (!) 138/55  (!) 153/67 (!) 147/65   Pulse: 59  60 63   Resp: 16  16    Temp: 97.7 °F (36.5 °C)  97.5 °F (36.4 °C)    TempSrc: Oral  Oral    SpO2: 99%  93% 96%   Weight:  115 lb 8.3 oz (52.4 kg)     Height:           I/O:    Intake/Output Summary (Last 24 hours) at 1/13/2022 1136  Last data filed at 1/13/2022 0546  Gross per 24 hour   Intake 2083.1 ml   Output 1200 ml   Net 883.1 ml       Physical Exam:   General: no apparent distress, alert and oriented x3, afebrile  Chest/Lungs: no accessory muscle use  Cardiac: regular rate and rhythm  Abdomen: soft, non-tender, non-distended  Extremities: warm and well perfused, no signs of cyanosis or ischemia, no significant edema, bilateral upper and lower extremity motorsensory intact, darkened discoloration to dorsal distal half of Left Second Toe with reported decreased sensation, plantar surface of toe appears similar to other toes - toe with no significant changes from yesterday's exam  Pulses:  -L DP: +2 palp  Wounds:   -Left Second Toe - darkened discoloration, skin intact, no skin breakdown    Labs:   Lab Results   Component Value Date     01/13/2022    K 4.1 01/13/2022    K 3.8 01/11/2022    CL 99 01/13/2022    CO2 22 01/13/2022    BUN 35 01/13/2022    CREATININE 1.4 01/13/2022    GFRAA 46 01/13/2022    GFRAA 60 05/23/2013    LABGLOM 38 01/13/2022    GLUCOSE 90 01/13/2022    PHOS 3.3 01/13/2022    MG 1.80 01/13/2022    CALCIUM 8.8 01/13/2022     Lab Results   Component Value Date    WBC 3.6 01/13/2022    RBC 2.83 01/13/2022    HGB 9.1 01/13/2022    HCT 27.2 01/13/2022    MCV 96.0 01/13/2022    RDW 14.5 01/13/2022     01/13/2022     Lab Results   Component Value Date    INR 1.03 06/19/2021    PROTIME 12.0 06/19/2021        Scheduled Meds:    nitroGLYCERIN  1 patch TransDERmal Q24H    amLODIPine  5 mg Oral Daily    metoprolol succinate  25 mg Oral Nightly    enoxaparin  30 mg SubCUTAneous Daily    sodium chloride flush  10 mL IntraVENous 2 times per day    aspirin  81 mg Oral Daily    atorvastatin  80 mg Oral Nightly    clopidogrel  75 mg Oral Daily    dicyclomine  20 mg Oral 4x Daily AC & HS    DULoxetine  60 mg Oral Daily    insulin glargine  8 Units SubCUTAneous BID    isosorbide mononitrate  30 mg Oral Daily    linaclotide  145 mcg Oral QAM AC    pantoprazole  40 mg Oral QAM AC    QUEtiapine  25 mg Oral Nightly    ranolazine  1,000 mg Oral BID    [Held by provider] torsemide  10 mg Oral Daily    insulin lispro  0-12 Units SubCUTAneous TID WC    insulin lispro  0-6 Units SubCUTAneous Nightly     Continuous Infusions:    sodium chloride      dextrose      dextrose         Imaging:  Bilateral Lower Extremity Arterial Duplex - 1/10/2022  Summary     RICK not obtained due to incompressible vessels. However no evidence of  hemodynamically significant occlusive disease in both lower extremities. Echo - 1/11/2022   Summary   Normal LV size and wall motion. EF is    60%. Grade II diastolic dysfunction   with elevated LV filling pressures. The left atrium is mildly dilated. Normal right ventricular size and function   Trivial Mitral and Tricuspid regurgitation. Assessment:   -Left Second Toe discoloration - pt reports presence for 1-2 months  -Palpable Left DP  -Bilateral Arterial Duplex with no significant occlusive disease  -Echo read as normal    Plan:  -Continue to monitor toe  -No Vascular intervention planned at this time  -Podiatry consulted      All questions and concerns were addressed with the patient.  I have discussed the above stated plan with patient. The patient verbalized understanding and agreed with the plan. Thank you for allowing to us to participate in the care of Meena Carrillo        Pt seen and examined. for DIAGNOSIS OF discoloration left second toe . Labs reviewed. Vitals   Vitals:    01/13/22 0453 01/13/22 0530 01/13/22 0706 01/13/22 1037   BP: (!) 138/55  (!) 153/67 (!) 147/65   Pulse: 59  60 63   Resp: 16  16    Temp: 97.7 °F (36.5 °C)  97.5 °F (36.4 °C)    TempSrc: Oral  Oral    SpO2: 99%  93% 96%   Weight:  115 lb 8.3 oz (52.4 kg)     Height:             On exam she still has a palpable dorsalis pedis pulse warm foot the plantar surface of the second toe looks normal dorsal surface looks abnormal kind of dry and scaly scabbed looking. Patient still says it is a little less painful. Echo and arterial duplex did not show source of emboli. Not 100% convinced that this is an embolic event but it is hard to rule it out. We appreciate the podiatry consult to get their opinion. But what I recommended is just discharging her from our standpoint and less renal needs to keep her in longer and following this to see if it demarcates or not.   Told  that the worse scenario would be , losing the toe but that this would not affect her walking and she should heal it easily with her good circulation    Would like to see her back in my office in 2 weeks call [phone] for an appointment

## 2022-01-13 NOTE — PROGRESS NOTES
Patient resting in bed and is A&O x4. Assessment completed and medications administered. See Mar. Patient tolerating PO intake. Complaining of nausea. Medication administered for nausea. IV in left AC infusing. Left second toe discolored. Patient denies any additional requests at this time. Fall precautions in place, call light within reach, and bedside table nearby. Will continue to monitor and round on patient q 2 hours.        Electronically signed by Tavares Beckham RN on 1/13/2022 at 10:40 AM

## 2022-01-13 NOTE — PLAN OF CARE
Problem: Falls - Risk of:  Goal: Will remain free from falls  Description: Will remain free from falls  Outcome: Ongoing  Note: Fall risk assessment completed . Fall precautions in place, bed/ chair alarm on, side rails 2/4 up, call light in reach, educated pt on calling for assistance when needed, room clear of clutter. Pt verbalized understanding. Problem: Falls - Risk of:  Goal: Absence of physical injury  Description: Absence of physical injury  Outcome: Ongoing  Note: Patient remains free from physical injury. Patient educated on safety precautions. Will continue to monitor to ensure patient remains free from physical injury throughout remainder of shift. Problem: Pain:  Goal: Pain level will decrease  Description: Pain level will decrease  Outcome: Ongoing  Note: Pt educated to attempt non-phagological method of pain control, but it it becomes too strong use PRN analgesics. Pain and discomfort being managed PRN analgesics per MD orders. Pt able to express presence of pain. Problem: Pain:  Goal: Control of acute pain  Description: Control of acute pain  Outcome: Ongoing  Note: Patient educated on acute pain. Taught patient to use call light to ask for pain medication. PRN pain medication given for acute pain. Will continue to monitor pain per unit protocol. Problem: Pain:  Goal: Control of chronic pain  Description: Control of chronic pain  Outcome: Ongoing  Note: Patient educated on chronic pain. Taught patient to use call light to ask for pain medication. PRN pain medication given for chronic pain. Will continue to monitor pain per unit protocol. Problem: Nutrition  Goal: Optimal nutrition therapy  Outcome: Ongoing  Note: Patient educated on nutritional guidelines. Patient encouraged to eat a well balanced diet.

## 2022-01-13 NOTE — DISCHARGE INSTR - COC
Continuity of Care Form    Patient Name: Luis A Vanessa   :  1955  MRN:  [id number]    Admit date:  2022  Discharge date:  22    Code Status Order: Full Code   Advance Directives:      Admitting Physician:  Taty Noguera MD  PCP: Addie Lynch MD    Discharging Nurse: [facility] Unit/Room#: I3O-7215/2666-76  Discharging Unit Phone Number: [phone]    Emergency Contact:   Extended Emergency Contact Information  Primary Emergency Contact: Wilfrid Randall [address] Phone: [phone]  Mobile Phone: [phone]  Relation: Child  Secondary Emergency Contact: Jacqueline Dupree  Address: Ruben Calvert [address] Phone: [phone]  Mobile Phone: [phone]  Relation: Child    Past Surgical History:  Past Surgical History:   Procedure Laterality Date    ABLATION OF DYSRHYTHMIC FOCUS    and 2020    times 2    ARTERY BIOPSY Right 2014    RIGHT TEMPORAL ARTERY BIOPSY    CAROTID ARTERY SURGERY Left     clean up per pt    CATARACT REMOVAL Bilateral     CHOLECYSTECTOMY      COLONOSCOPY N/A 2021    COLONOSCOPY WITH BIOPSY performed by Nelsy Merchant MD at [address] 10/15/2021    COLONOSCOPY performed by Nelsy Merchant MD at [address]    HYSTERECTOMY      JOINT REPLACEMENT Right     KNEE ARTHROSCOPY Right     PTCA  10/2019    LAD and RCA inrtervention    TUNNELED VENOUS PORT PLACEMENT      left thigh.   SMART PORT-----Removed--total of 4 port placement and removal    UPPER GASTROINTESTINAL ENDOSCOPY N/A 2020    EGD DIAGNOSTIC ONLY performed by Augusta Mcdowell MD at [facility] History:   Immunization History   Administered Date(s) Administered    COVID-19, [facility] [place], 30mcg/0.3mL 02/15/2021, 2021    Influenza Vaccine, unspecified formulation 2009, 10/08/2010    Influenza Virus Vaccine 10/08/2010, 2013, 09/30/2014    Influenza, Intradermal, Preservative free 10/04/2012    Influenza, Intradermal, Quadrivalent, Preservative Free 11/07/2017    Influenza, Quadv, IM, PF (6 mo and older Fluzone, Flulaval, Fluarix, and 3 yrs and older Afluria) 09/28/2016, 09/14/2018, 10/18/2021    Influenza, Quadv, adjuvanted, 65 yrs +, IM, PF (Fluad) 10/01/2020    Influenza, Triv, inactivated, subunit, adjuvanted, IM (Fluad 65 yrs and older) 09/24/2019    Pneumococcal Polysaccharide ([id number]) 12/12/2013    Tdap (Boostrix, Adacel) 11/17/2009, 10/28/2016       Active Problems:  Patient Active Problem List   Diagnosis Code    HTN (hypertension) I10    Hyperlipidemia E78.5    CAD (coronary artery disease) I25.10    Palpitation R00.2    PVC (premature ventricular contraction) I49.3    Depression F32. 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    Cataract H26.9    Benign essential tremor G25.0    Tobacco use Z72.0    Chronic diastolic CHF (congestive heart failure), NYHA class 2 ([facility]) I50.32    COPD (chronic obstructive pulmonary disease) ([facility]) J44.9    NSTEMI (non-ST elevated myocardial infarction) ([facility]) I21.4    Rotator cuff tendonitis M75.80    Essential hypertension I10    Fibromyalgia M79.7    Chronic pain syndrome G89.4    Headache, chronic migraine without aura, intractable, with status G43.711    Type 2 diabetes mellitus with diabetic chronic kidney disease ([facility]) E11.22    S/P right coronary artery (RCA) stent placement Z95.5    Irritable bowel syndrome K58.9    Giant cell arteritis ([facility]) M31.6    Gastro-esophageal reflux disease without esophagitis K21.9    A-fib ([facility]) I48.91    Coronary artery disease involving native coronary artery of native heart with angina pectoris ([facility] I25.119    DM (diabetes mellitus), type 2, uncontrolled ([facility] E11.65    Right knee pain M25.561    Chronic painful diabetic neuropathy ([facility]) E11.40    CAD in native artery I25.10    Unstable angina ([facility]) I20.0    Generalized weakness R53.1    Reactive airway disease with wheezing with acute exacerbation J45.901    Headache R51.9    DMII (diabetes mellitus, type 2) ([facility]) E11.9    Acute-on-chronic renal failure (HCC) N17.9, N18.9    Age-related nuclear cataract, bilateral H25.13    Chronic prescription opiate use Z79.891    Coronary stent restenosis due to progression of disease T82.855A, I25.10    Current moderate episode of major depressive disorder ([facility]) F32.1    Diabetic retinopathy of both eyes without macular edema associated with type 2 diabetes mellitus ([facility] E11.319    Hypertensive heart and chronic kidney disease with heart failure and stage 1 through stage 4 chronic kidney disease, or unspecified chronic kidney disease ([facility]) I13.0    Hypertensive retinopathy, bilateral H35.033    Ischemic cardiomyopathy I25.5    Moderate episode of recurrent major depressive disorder ([facility]) F33.1    Other age-related incipient cataract, bilateral H25.093    Presence of intraocular lens Z96.1    Punctate keratitis, bilateral H16.143    Regular astigmatism, bilateral H52.223    Palliative care encounter Z51.5    CKD (chronic kidney disease) stage 3, GFR 30-59 ml/min ([facility]) N18.30    Compression of brain ([facility]) G93.5    Thrombosis of superior vena cava, 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    Type 2 diabetes mellitus with mild nonproliferative diabetic retinopathy without macular edema, bilateral ([facility] T11.1997    Uncomplicated opioid dependence ([facility]) F11.20    Acute onset aura migraine G43.109    Angina at rest [facility]) I20.8    Chest pain R07.9    KOLBY (acute kidney injury) ([facility] N17.9    Moderate malnutrition ([facility]) E44.0       Isolation/Infection:   Isolation            No Isolation          Patient Infection Status       Infection Onset Added Last Indicated Last Indicated By Review Planned Expiration Resolved Resolved By    None active    Resolved    COVID-19 (Rule Out) 07/06/20 07/06/20 07/06/20 COVID-19 (Ordered)   07/06/20 Rule-Out Test Resulted            Nurse Assessment:  Last Vital Signs: BP (!) 153/67   Pulse 60   Temp 97.5 °F (36.4 °C) (Oral)   Resp 16   Ht 5' (1.524 m)   Wt 115 lb 8.3 oz (52.4 kg)   SpO2 93%   BMI 22.56 kg/m²     Last documented pain score (0-10 scale): Pain Level: 7  Last Weight:   Wt Readings from Last 1 Encounters:   01/13/22 115 lb 8.3 oz (52.4 kg)     Mental Status:  oriented and alert    IV Access:  - None    Nursing Mobility/ADLs:  Walking   Assisted  Transfer  Assisted  Bathing  Assisted  Dressing  Assisted  Toileting  Assisted  Feeding  Assisted  Med Admin  Assisted  Med Delivery   whole    Wound Care Documentation and Therapy:        Elimination:  Continence: Bowel: Yes  Bladder: Yes  Urinary Catheter: None   Colostomy/Ileostomy/Ileal Conduit: No       Date of Last BM: 1/9/2022    Intake/Output Summary (Last 24 hours) at 1/13/2022 1028  Last data filed at 1/13/2022 0546  Gross per 24 hour   Intake 2083.1 ml   Output 1200 ml   Net 883.1 ml     I/O last 3 completed shifts: In: 3346.1 [P.O.:900; I.V.:2446.1]  Out: 1500 [Urine:1500]    Safety Concerns: At Risk for Falls    Impairments/Disabilities:      None    Nutrition Therapy:  Current Nutrition Therapy:   - Oral Diet:  Carb Control 4 carbs/meal (1800kcals/day)    Routes of Feeding: Oral  Liquids: Thin Liquids  Daily Fluid Restriction: yes - amount 1500  Last Modified Barium Swallow with Video (Video Swallowing Test): not done    Treatments at the Time of Hospital Discharge:   Respiratory Treatments: ***  Oxygen Therapy:  is not on home oxygen therapy.   Ventilator:    - No ventilator support    Rehab Therapies: Physical Therapy and Occupational Therapy  Weight Bearing Status/Restrictions: No weight bearing restirctions  Other Medical Equipment (for information only, NOT a DME order):  {EQUIPMENT:[id number]}  Other Treatments: HOME HEALTH CARE: LEVEL 1 STANDARD    Home health agency to establish plan of care for patient over 60 day period   Nursing  Initial home SN evaluation visit to occur within 24-48 hours for:  medication management  VS and clinical assessment  S&S chronic disease exacerbation education + when to contact MD / NP  care coordination  Medication Reconciliation during 1st SN visit       PT/OT   Evaluate with goal of regaining prior level of functioning   OT to evaluate if patient has [address] needs for personal care      PCP Visit scheduled within 7 days of hospital discharge       Patient's personal belongings (please select all that are sent with patient):  None    RN SIGNATURE:  Electronically signed by Cachorro Lopez RN on 1/13/22 at 10:47 AM EST    CASE MANAGEMENT/SOCIAL WORK SECTION    Inpatient Status Date: 1/9/22    Readmission Risk Assessment Score:  Readmission Risk              Risk of Unplanned Readmission:  50           Discharging to Facility/ Agency   Name:   Address:  Phone:  Fax:    Discharging to Facility/ Agency   Name:  [facility] care    Address: [address] [place] [address] [address] [facility] [address], [address] [place] [address]  Phone: [phone]  Fax: [phone]      / signature: Electronically signed by Allyssa Baird on 1/13/2022 at 10:28 AM     PHYSICIAN SECTION    Prognosis: Good    Condition at Discharge: Stable    Rehab Potential (if transferring to Rehab): Good    Recommended Labs or Other Treatments After Discharge: PT, OT, follow up with podiatry in 1 week, follow up with Dr Pj Lopez in 1 week, follow up with nephrology(Dr Cb Chung)in 1 week     Physician Certification: I certify the above information and transfer of Ignacio Peres  is necessary for the continuing treatment of the diagnosis listed and that she requires Home Care for greater 30 days.      Update Admission H&P: No change in H&P    PHYSICIAN SIGNATURE: Electronically signed by Jason Valle MD on 1/13/22 at 10:37 AM EST

## 2022-01-13 NOTE — CONSULTS
Department of Podiatry Consult Note  Raul Lewis DPM Attending       Reason for Consult:  LEFT 2nd toe ischemia  Requesting Physician:  Bart BRIDGES    CHIEF COMPLAINT:  Discoloration of LEFT 2nd toe    HISTORY OF PRESENT ILLNESS:                The patient is a 73 y.o. female with significant past medical history of atrial fib, Coronary artery disease, Dabetes with peripheral neuropathy and COPD who is consulted for ischemic changes of her LEFT 2nd toe over the last 1-2 weeks. She relates that all of her toes feel cold.  She denies having covid or blood clots    Past Medical History:        Diagnosis Date    Acid reflux     Anemia     Anxiety and depression     Arthritis     Asthma     Atrial fibrillation (HCC)     CAD (coronary artery disease) 12/3/2012    Cerebral artery occlusion with cerebral infarction [facility])     TIA\"\"S--right sided weakness & headache    CHF (congestive heart failure) ([facility])     Chronic kidney disease--stage III     40% kidney function    COPD (chronic obstructive pulmonary disease) [facility]     DM2 (diabetes mellitus, type 2) ([facility]     Dysarthria     Fibromyalgia 6/7/2016    Headache(784.0) 2/19/2013    Hemisensory loss     History of blood transfusion 11/2020    pt denies having transfusion reaction    Hyperlipidemia     Hypertension     IBS (irritable bowel syndrome)     Inferior vena cava occlusion (HCC)     Keratitis     MI, old     Neuropathy     Superior vena cava obstruction     Temporal arteritis ([facility] 2/24/2014    Wears glasses      Past Surgical History:        Procedure Laterality Date    ABLATION OF DYSRHYTHMIC FOCUS  1999  and 11/2020    times 2    ARTERY BIOPSY Right 04/23/2014    RIGHT TEMPORAL ARTERY BIOPSY    CAROTID ARTERY SURGERY Left     clean up per pt    CATARACT REMOVAL Bilateral     CHOLECYSTECTOMY      COLONOSCOPY N/A 4/9/2021    COLONOSCOPY WITH BIOPSY performed by Elena Conway MD at [facility] 10/15/2021    COLONOSCOPY performed by Jimy Quezada MD at [address]    HYSTERECTOMY      JOINT REPLACEMENT Right     KNEE ARTHROSCOPY Right     PTCA  10/2019    LAD and RCA inrtervention    TUNNELED VENOUS PORT PLACEMENT      left thigh.   SMART PORT-----Removed--total of 4 port placement and removal    UPPER GASTROINTESTINAL ENDOSCOPY N/A 7/6/2020    EGD DIAGNOSTIC ONLY performed by Delilah Gardiner MD at [facility]     Current Medications:    Current Facility-Administered Medications: amLODIPine (NORVASC) tablet 5 mg, 5 mg, Oral, Daily  metoprolol succinate (TOPROL XL) extended release tablet 25 mg, 25 mg, Oral, Nightly  sodium bicarbonate 75 mEq in sodium chloride 0.45 % 1,000 mL infusion, , IntraVENous, Continuous  enoxaparin (LOVENOX) injection 30 mg, 30 mg, SubCUTAneous, Daily  sodium chloride flush 0.9 % injection 10 mL, 10 mL, IntraVENous, 2 times per day  sodium chloride flush 0.9 % injection 10 mL, 10 mL, IntraVENous, PRN  0.9 % sodium chloride infusion, 25 mL, IntraVENous, PRN  promethazine (PHENERGAN) tablet 12.5 mg, 12.5 mg, Oral, Q6H PRN **OR** ondansetron (ZOFRAN) injection 4 mg, 4 mg, IntraVENous, Q6H PRN  acetaminophen (TYLENOL) tablet 650 mg, 650 mg, Oral, Q6H PRN **OR** acetaminophen (TYLENOL) suppository 650 mg, 650 mg, Rectal, Q6H PRN  glucose (GLUTOSE) 40 % oral gel 15 g, 15 g, Oral, PRN  dextrose 50 % IV solution, 12.5 g, IntraVENous, PRN  glucagon (rDNA) injection 1 mg, 1 mg, IntraMUSCular, PRN  dextrose 5 % solution, 100 mL/hr, IntraVENous, PRN  albuterol (PROVENTIL) nebulizer solution 2.5 mg, 2.5 mg, Nebulization, Q4H PRN  aspirin chewable tablet 81 mg, 81 mg, Oral, Daily  atorvastatin (LIPITOR) tablet 80 mg, 80 mg, Oral, Nightly  clopidogrel (PLAVIX) tablet 75 mg, 75 mg, Oral, Daily  dicyclomine (BENTYL) tablet 20 mg, 20 mg, Oral, 4x Daily AC & HS  DULoxetine (CYMBALTA) extended release capsule 60 mg, 60 mg, Oral, Daily  insulin glargine (LANTUS) injection vial 8 Units, 8 Units, SubCUTAneous, BID  isosorbide mononitrate (IMDUR) extended release tablet 30 mg, 30 mg, Oral, Daily  linaclotide (LINZESS) capsule 145 mcg, 145 mcg, Oral, QAM AC  pantoprazole (PROTONIX) tablet 40 mg, 40 mg, Oral, QAM AC  oxyCODONE-acetaminophen (PERCOCET) 7.5-325 MG per tablet 1 tablet, 1 tablet, Oral, Q6H PRN  QUEtiapine (SEROQUEL) tablet 25 mg, 25 mg, Oral, Nightly  ranolazine (RANEXA) extended release tablet 1,000 mg, 1,000 mg, Oral, BID  tiZANidine (ZANAFLEX) tablet 2 mg, 2 mg, Oral, BID PRN  [Held by provider] torsemide (DEMADEX) tablet 10 mg, 10 mg, Oral, Daily  glucose (GLUTOSE) 40 % oral gel 15 g, 15 g, Oral, PRN  dextrose 50 % IV solution, 12.5 g, IntraVENous, PRN  glucagon (rDNA) injection 1 mg, 1 mg, IntraMUSCular, PRN  dextrose 5 % solution, 100 mL/hr, IntraVENous, PRN  insulin lispro (HUMALOG) injection vial 0-12 Units, 0-12 Units, SubCUTAneous, TID WC  insulin lispro (HUMALOG) injection vial 0-6 Units, 0-6 Units, SubCUTAneous, Nightly  morphine (PF) injection 1 mg, 1 mg, IntraVENous, Q4H PRN  Allergies:   Patient has no known allergies. Social History:    TOBACCO:  Former smoker. Type of tobacco used:  Cigarettes. Quantity per day:  1 pack(s). Duration of tobacco use:  17 years. Approximate Quit Date:  Quit 3yrs ago.   ETOH:  No ETOH use   Family History:       Problem Relation Age of Onset    Diabetes Mother     High Cholesterol Mother     Stroke Mother     Cancer Mother     High Blood Pressure Mother     No Known Problems Paternal Grandfather         lung issues      REVIEW OF SYSTEMS:    CONSTITUTIONAL:  negative  INTEGUMENT:  positive for dryness, skin color change, changes in hair and changes in nails  ENDOCRINE:  positive for hair loss and diabetic symptoms including neither foot ulcerations nor skin dryness nor neuropathy  MUSCULOSKELETAL:  positive for  stiff joints and decreased range of motion  NEUROLOGICAL: positive for gait problems and numbness  PHYSICAL EXAM:      Vitals:    BP (!) 158/69   Pulse 61   Temp 98.1 °F (36.7 °C) (Oral)   Resp 18   Ht 5' (1.524 m)   Wt 111 lb 8.8 oz (50.6 kg)   SpO2 98%   BMI 21.79 kg/m²     LABS:   Recent Labs     01/11/22  0758 01/12/22  0505   WBC 4.0 3.6*   HGB 10.3* 9.1*   HCT 30.7* 28.0*    176     Recent Labs     01/12/22  0505   *   K 4.1   CL 97*   CO2 22   PHOS 4.6   BUN 43*   CREATININE 1.7*     No results for input(s): PROT, INR, APTT in the last 72 hours. LOWER EXTREMITY EXAMINATION   SKIN:    LEFT 2nd toe is dark with ischemic changes of distal 2/3 of digit with dry skin, without fissure or ulceration    NEUROLOGIC:    Pain sensation isnot significantly changed Bilateral.  Light touch is decreasedBilateral. positive history of paresthesia Bilateral. negativehistory of burning Bilateral. Deep tendon reflexes are 2 to include patellar and achilles Bilateral. Lyme--Gordon 5.07 monofilament sensitivity is abnormal 2 to 5 spots tested Bilateral.    VASCULAR:    bilaterally Dorsalis pedis is 2. bilaterally Posterior tibial pulse is 1. Trace edema left. none Varicosities bilaterally. MUSCULOSKELETAL:  Army Cheers is untested due to lesion of toe. Muscle strength is 4/5 to all groups tested to include dorsiflexion, plantarflexion, inversion, eversion, and digital Bilateral . The ranges of motion of all joints tested from ankle distal is decreased there is no crepitus noted Bilateral.      IMPRESSION/RECOMMENDATIONS    1. Early ischemic changes of LEFT 2nd toe without signs of infection  Afebrile, absence of leukocytosis  Most probably associated with Atrial fib, and distal shower embolus  Tansdermal Nitro patch ordered for placement nearest the 2nd toe daily    Thank you for the opportunity to take part in the patient's care.     Pippa Cason DPM  [facility]  [phone]

## 2022-01-13 NOTE — PROGRESS NOTES
Department of Internal Medicine  Nephrology Progress Note    72 yo female with a past medical history significant for A-fib, CAD, TIA, CHF, COPD, CKD 3, DM II, HLD, HTN, IBS, Cardiac Ablations, Left Carotid Endarterectomy, Coronary Stents, and Multiple Port Placements that presented with Chest pain radiating to her left shoulder and back (she has been seen by Cardiology who feel it is non cardiac in nature) - she also has discoloration in 2nd toe of left foot and Vascular is evaluating that. She denies NSAID use. She has not had recent IV contrast exposure - Creatinine has been trending up from 1.2 to its current value of 2.1 mg/dl. Events noted , labs reviewed . Over all feels better . REVIEW OF SYSTEMS:  No CP/SOB, some KELLER . Poor appetite     Family at bed side     Physical Exam:    VITALS:  BP (!) 153/67   Pulse 60   Temp 97.5 °F (36.4 °C) (Oral)   Resp 16   Ht 5' (1.524 m)   Wt 115 lb 8.3 oz (52.4 kg)   SpO2 93%   BMI 22.56 kg/m²   24HR INTAKE/OUTPUT:      Intake/Output Summary (Last 24 hours) at 1/13/2022 1018  Last data filed at 1/13/2022 0546  Gross per 24 hour   Intake 2083.1 ml   Output 1200 ml   Net 883.1 ml       Constitutional:  Doing well  Respiratory:  Few rhonchi   Gastrointestinal:  No  tenderness.   Normal Bowel Sounds  Cardiovascular:  S1, S2 RRR   Edema:  +  edema    DATA:    CBC:  Lab Results   Component Value Date    WBC 3.6 01/13/2022    RBC 2.83 01/13/2022    HGB 9.1 01/13/2022    HCT 27.2 01/13/2022    MCV 96.0 01/13/2022    MCH 32.1 01/13/2022    MCHC 33.4 01/13/2022    RDW 14.5 01/13/2022     01/13/2022    MPV 8.3 01/13/2022     CMP:  Lab Results   Component Value Date     01/13/2022    K 4.1 01/13/2022    K 3.8 01/11/2022    CL 99 01/13/2022    CO2 22 01/13/2022    BUN 35 01/13/2022    CREATININE 1.4 01/13/2022    GFRAA 46 01/13/2022    GFRAA 60 05/23/2013    AGRATIO 0.9 01/09/2022    LABGLOM 38 01/13/2022    GLUCOSE 90 01/13/2022    PROT 8.1 01/09/2022    PROT 8.1 02/15/2013    CALCIUM 8.8 01/13/2022    BILITOT 0.4 01/09/2022    ALKPHOS 120 01/09/2022    AST 23 01/09/2022    ALT 13 01/09/2022      Hepatic Function Panel:   Lab Results   Component Value Date    ALKPHOS 120 01/09/2022    ALT 13 01/09/2022    AST 23 01/09/2022    PROT 8.1 01/09/2022    PROT 8.1 02/15/2013    BILITOT 0.4 01/09/2022    BILIDIR <0.2 08/22/2019    IBILI see below 08/22/2019      Phosphorus:   Lab Results   Component Value Date    PHOS 3.3 01/13/2022       ASSESSMENT:  Active Problems:    Chest pain    Moderate malnutrition (HCC)  Resolved Problems:    * No resolved hospital problems. *      PLAN:  1 . KOLBY - likely hemodynamic in nature due to rapid correction of BP and diuretic use  - Permissive hypertension recommended at this time  - Ok to resume low dose Torsemide post dc . - Improving with IVF. Dc IVF . -U/S noted  . Rt atrophic kidney . - avoid exposure to Nephrotoxic agents     2. Acidosis -resolved .      3. Chest pain - plan per Cardiology/Medicine     4. Left Second Toe discoloration - pt reports presence for 1-2 months- plan per Vascular     5. HTN - modifications made to antihypertensive regimen as mentioned in # 1     6. Anemia - w/u in progress     Advised to f/u with Dr Edgar Herman in 1-2 wks post  D/c .      Tahira Santana MD, FACP

## 2022-01-13 NOTE — CARE COORDINATION
DISCHARGE SUMMARY     DATE OF DISCHARGE: 1/13/22    DISCHARGE DESTINATION: Home      HOME CARE AGENCY: [facility] / [facility]             PHONE NUMBER: [phone]             FAX NUMBER: [phone]    DME ORDERED: jr rolling walker       COMMENTS: Notified [facility] of d/c as well and faxed requested info for follow up.

## 2022-01-18 ENCOUNTER — TELEPHONE (OUTPATIENT)
Dept: CARDIOLOGY CLINIC | Age: 67
End: 2022-01-18

## 2022-01-18 DIAGNOSIS — Z01.818 PRE-OP TESTING: ICD-10-CM

## 2022-01-18 DIAGNOSIS — I48.0 PAROXYSMAL A-FIB (HCC): Primary | ICD-10-CM

## 2022-01-18 NOTE — LETTER
[address] [facility] ([facility])        Dear, Gavino Flores procedure has been scheduled for Monday 5/16/2022 at [facility] with Dr. Rozina Beasley MD.  Please arrive directly to the Cath Lab at 6 am.     You will need to make arrangements to have a responsible adult drive you home after your procedure and someone needs to stay with you for 24 hours. Procedure labs Procedure labs will need to be completed at [facility] Wednesday 5/11/2022 the week before your procedure. Please check in with Registration in the main lobby of the hospital. The test to perform [address] LAB NOT OUTPATIENT LAB. are as follows BMP, CBC, Urine test and Type and Screen. Pre-Procedure Instructions:  · You will need to FAST for at least 8 hours prior to your procedure, nothing to eat or drink after midnight. · NO caffeine the morning of your procedure. · You need to wash your body with a soap called HIBICLENS the evening before or the morning of your scheduled procedure from the neck down. A prescription has been sent to your pharmacy. If your insurance does not cover the soap, you can pick it up over the counter, ask your pharmacy for help. · Do not stop taking Plavix. · Take 4 Baby Aspirin the day of the procedure. · Hold Diabetic medication the day of the procedure. · Use your inhalers as prescribed. · Hold pain medication the day of the procedure. · Hold multi. Vitamins the day of procedure. · ALL other medications can be taken in the morning with sips of water  · Do not use any lotions, creams or perfume the morning of procedure. · You will be going home the same day but if unable to you may stay overnight at [facility] after your procedure so please pack an overnight bag if needed. Pre-certification  Our office will be in contact with your insurance company regarding pre-certification.  If for some reason, your procedure is still pending the day before your scheduled procedure, it must be moved or delayed until we have authorization to proceed. If you have Medicare, no pre-cert is required. Co-Payment  If you have a copay or a deposit due that is your responsibility to pay at the time of service, the Cardiac Cath lab will accept your payment on the day of your procedure. Please bring with you a form of payment. Any questions regarding your copay, please contact your insurance company. Please bring your photo ID, insurance cards and copayment if you have one. (Cash, checks & credit cards are accepted)        On the day of your procedure      Please report directly to  The [address] Jemal Herndon [facility] [address]. You should park in the lot directly in front of the hospital, you are able to [address] for free from (6AM-4:30PM). As you approach the hospital you will notice that there are two entrances: The first entrance is the MAIN entrance that will be on your LEFT that has the Revolving Door, the second entrance is the [facility] entrance that will be on your RIGHT. We prefer that you take the Bridge as it's closer to your destination. Once you enter the Hospital, turn to your RIGHT and walk directly to the Cath lab which will be at the very end. I will be in contact with you the week before your procedure. You may reach me at [phone] in the meantime.      Sincerely,     Lorenza Lafleur RN,  Watchman Coordinator

## 2022-01-18 NOTE — PROGRESS NOTES
Needs to be evaluated for watchman but before that we need to see if she has an iliac vein I think they were occluded

## 2022-01-20 ENCOUNTER — PATIENT MESSAGE (OUTPATIENT)
Dept: CARDIOLOGY CLINIC | Age: 67
End: 2022-01-20

## 2022-01-20 RX ORDER — NITROGLYCERIN 0.4 MG/1
TABLET SUBLINGUAL
Qty: 25 TABLET | Refills: 3 | Status: SHIPPED | OUTPATIENT
Start: 2022-01-20 | End: 2022-01-27

## 2022-01-20 NOTE — TELEPHONE ENCOUNTER
Discussed with Dr. Jamshid Avila, recommends taking Nitro for recurrent chest pains and follow up as scheduled.

## 2022-01-21 ENCOUNTER — TELEPHONE (OUTPATIENT)
Dept: PRIMARY CARE CLINIC | Age: 67
End: 2022-01-21

## 2022-01-21 NOTE — TELEPHONE ENCOUNTER
Patient discharged from hospital and was told to discontinue Symbicort,Bentyl, Reglan, Novolog, torsemide. Patient has continued to take these. Please advise ASAP.

## 2022-01-23 ENCOUNTER — PATIENT MESSAGE (OUTPATIENT)
Dept: PAIN MANAGEMENT | Age: 67
End: 2022-01-23

## 2022-01-24 ENCOUNTER — TELEPHONE (OUTPATIENT)
Dept: PRIMARY CARE CLINIC | Age: 67
End: 2022-01-24

## 2022-01-24 NOTE — TELEPHONE ENCOUNTER
Home care agency calling:  [place]: [phone]  Fax: [phone]   Christy Zhong   Update:   Calling to request that you address the ankylosing spondylitis of cervical region when pt comes in. pls send any notes pertaining to this Diagnosis when she comes in on 1/27/22. Pls send via fax. Thank you!

## 2022-01-25 ENCOUNTER — TELEPHONE (OUTPATIENT)
Dept: CARDIOLOGY CLINIC | Age: 67
End: 2022-01-25

## 2022-01-25 NOTE — TELEPHONE ENCOUNTER
From: Henry Del Cid  To: Dr. Benito Nelson: 1/23/2022 6:31 PM EST  Subject: Pain    Mayur in so much pain I can't sleep because of my disc problem lower back pain down my right leg miss my last appointment because I was in the hospital but I do have a n appointment to see you on the8 of feb but if you don't have anything before then can you prescribe something just until my appointment and if you can see me before then it have to be anytime after 2:00 pm to make sure that I have a ride thanks

## 2022-01-25 NOTE — TELEPHONE ENCOUNTER
Patient called with c/o palpitations, left sided chest discomfort, KELLER and says her BP this morning was 179/90. Informed patient that on 1/20/22 when she sent a IPWireless message, Dr. Randy Carvalho instructed her to take [facility] for recurrent chest pains. I did suggest she take an additional dose of Toprol XL 50 mg tablet for the next 2 days to see if this helps the palpitations and BP. Keep f/u with Dr. Randy Carvalho on 1/27/22. She v/u to all.

## 2022-01-26 NOTE — PROGRESS NOTES
[facility]  Cardiology Consult    Thea Rosen  1955    January 27, 2022    Primary Cardiologist: Lidia Phipps MD  Referring Physician:     Reason for Referral: Left atrial appendage closure    CC: \"I have vaginal bleeding. \"      Subjective:     History of Present Illness:    Thea Rosen is a 73 y.o. patient with a PMH significant for coronary artery disease, anxiety, diabetes, paroxsymal atrial fibrillation, diastolic heart failure, hypertension, GI bleed. and hyperlipidemia. She has not been on anticoagulation therapy due to GI bleed in the past.     Patient is being referred for Left Atrial Appendage Closure with WATCHMAN device for management of stroke risk resulting from non-valvular atrial fibrillation. Based on their past history, it has been determined that they are poor candidates for long-term oral-anticoagulation, however may be tolerant of short term treatment with warfarin as necessary. Today, she is here to discuss Watchman. She has had recent weight loss for no reason. She has also been having vaginal bleeding. She has not followed up with gynecology for this. Past Medical History:   has a past medical history of Acid reflux, Anemia, Anxiety and depression, Arthritis, Asthma, Atrial fibrillation ([address] CAD (coronary artery disease), Cerebral artery occlusion with cerebral infarction ([address] CHF (congestive heart failure) ([address] Chronic kidney disease--stage III, COPD (chronic obstructive pulmonary disease) ([address] DM2 (diabetes mellitus, type 2) ([address] Dysarthria, Fibromyalgia, Headache(784.0), Hemisensory loss, History of blood transfusion, Hyperlipidemia, Hypertension, IBS (irritable bowel syndrome), Inferior vena cava occlusion ([address] Keratitis, MI, old, Neuropathy, Superior vena cava obstruction, Temporal arteritis ([address] and Wears glasses. Surgical History:   has a past surgical history that includes Tunneled venous port placement;  Cholecystectomy; ablation of dysrhythmic focus (1999  and 11/2020); Cataract removal (Bilateral); joint replacement (Right); Hysterectomy; Artery Biopsy (Right, 04/23/2014); Coronary angioplasty with stent (2020); Knee arthroscopy (Right); Percutaneous Transluminal Coronary Angio (10/2019); Upper gastrointestinal endoscopy (N/A, 7/6/2020); Carotid artery surgery (Left); Colonoscopy (N/A, 4/9/2021); and Colonoscopy (N/A, 10/15/2021). Social History:   reports that she quit smoking about 3 years ago. Her smoking use included cigarettes. She has a 17.50 pack-year smoking history. She has never used smokeless tobacco. She reports that she does not drink alcohol and does not use drugs. Family History:  family history includes Cancer in her mother; Diabetes in her mother; High Blood Pressure in her mother; High Cholesterol in her mother; No Known Problems in her paternal grandfather; Stroke in her mother. Home Medications:  Were reviewed and are listed in nursing record and/or below  Prior to Admission medications    Medication Sig Start Date End Date Taking?  Authorizing Provider   zoster recombinant adjuvanted vaccine [facility]) 50 MCG/0.5ML SUSR injection Inject 0.5 mLs into the muscle once for 1 dose 1/27/22 1/27/22 Yes Jessie Murcia MD   metoprolol succinate (TOPROL XL) 50 MG extended release tablet Take 1 tablet by mouth nightly 1/27/22  Yes Jessie Murcia MD   insulin glargine (BASAGLAR KWIKPEN) 100 UNIT/ML injection pen Inject 8 Units into the skin 2 times daily 1/27/22  Yes Jessie Murcia MD   amLODIPine (NORVASC) 5 MG tablet Take 2 tablets by mouth daily 1/27/22  Yes Jessie Murcia MD   isosorbide mononitrate (IMDUR) 60 MG extended release tablet Take 1 tablet by mouth daily 1/27/22  Yes Jessie Murcia MD   nitroGLYCERIN (NITRODUR) 0.1 MG/HR Place 1 patch onto the skin every 24 hours 1/27/22  Yes Jessie Murcia MD   docusate sodium (COLACE) 100 MG capsule Take 100 mg by mouth 2 times daily 11/29/21  Yes Historical Provider, MD DukeumabFermínaooe (AIMOVIG) 70 MG/ML SOAJ INJECT 140 MLS INTO THE SKIN EVERY 30 DAYS 12/15/21  Yes Jemal Salvador MD   tiZANidine (ZANAFLEX) 4 MG tablet Take 0.5-1 tablets by mouth 2 times daily as needed (muscle spasms) 12/7/21  Yes Jemal Salvador MD   DULoxetine (CYMBALTA) 60 MG extended release capsule Take 1 capsule by mouth daily 12/7/21  Yes Jemal Salvador MD   Ubrogepant (UBRELVY) 50 MG TABS Take 1 tablet po PRN at start of headaches, can repeat in 24 hours 12/7/21  Yes Jemal Salvador MD   QUEtiapine (SEROQUEL) 25 MG tablet Take 1 tablet by mouth nightly 12/7/21  Yes Jemal Salvador MD   atorvastatin (LIPITOR) 80 MG tablet TAKE 1 TABLET BY MOUTH NIGHTLY 11/2/21  Yes John Billingsley MD   clopidogrel (PLAVIX) 75 MG tablet TAKE ONE TABLET BY MOUTH EVERY DAY 11/2/21  Yes John Billingsley MD   ranolazine (RANEXA) 1000 MG extended release tablet Take 1 tablet by mouth 2 times daily 10/29/21  Yes Jin Higginbotham MD   linaclotide [facility]) 145 MCG capsule Take 1 capsule by mouth every morning (before breakfast) 10/15/21  Yes Geremias Dickey MD   ULTICARE MINI PEN NEEDLES 31G X 6 MM MISC USE WITH INSULINS FOUR TIMES A DAY 7/19/21  Yes John Billingsley MD   alirocumab (PRALUENT) 75 MG/ML SOAJ injection pen Inject 1 mL into the skin every 14 days 3/29/21  Yes CYRUS Ferris - CNP   omeprazole (PRILOSEC) 20 MG delayed release capsule TAKE 1 CAPSULE BY MOUTH DAILY 3/5/21  Yes John Billingsley MD   aspirin 81 MG chewable tablet Take 1 tablet by mouth daily 12/24/20  Yes Deven Luna MD   nitroGLYCERIN (NITROSTAT) 0.4 MG SL tablet Place 1 tablet under the tongue every 5 minutes as needed for Chest pain up to max of 3 total doses.  If no relief after 1 dose, call 911. 12/16/20  Yes John Billingsley MD   albuterol (PROVENTIL) (2.5 MG/3ML) 0.083% nebulizer solution Take 3 mLs by nebulization every 4 hours as needed for Wheezing 3/25/20  Yes John Billingsley MD   albuterol sulfate HFA (VENTOLIN HFA) 108 (90 Base) MCG/ACT inhaler Inhale 2 puffs into the lungs every 4 hours as needed for Wheezing or Shortness of Breath 12/6/19  Yes Michi Head MD        CURRENT Medications:  No current facility-administered medications for this visit. Allergies:  Patient has no known allergies. Review of Systems: All reviewed and refer to HPI  · Constitutional: no unanticipated weight loss. There's been no change in energy level, sleep pattern, or activity level. No fevers, chills. · Eyes: No visual changes or diplopia. No scleral icterus. · ENT: No Headaches, hearing loss or vertigo. No mouth sores or sore throat. · Cardiovascular: No Chest pain, tightness or discomfort.  No Shortness of breath. No Dyspnea on exertion, Orthopnea, Paroxysmal nocturnal dyspnea or breathlessness at rest.   No Palpitations.  No Syncope ('blackouts', 'faints', 'collapse') or dizziness. · Respiratory: No cough or wheezing, no sputum production. No hematemesis. · Gastrointestinal: No abdominal pain, appetite loss, blood in stools. No change in bowel or bladder habits. · Genitourinary: No dysuria, trouble voiding, or hematuria. · Musculoskeletal:  No gait disturbance, no joint complaints. · Integumentary: No rash or pruritis. · Neurological: No headache, diplopia, change in muscle strength, numbness or tingling. · Psychiatric: No anxiety or depression. · Endocrine: No temperature intolerance. No excessive thirst, fluid intake, or urination. No tremor. · Hematologic/Lymphatic: No abnormal bruising or bleeding, blood clots or swollen lymph nodes. · Allergic/Immunologic: No nasal congestion or hives. Objective: all reveiwed      PHYSICAL EXAM:      Vitals:    01/27/22 1331   BP: 134/76   Pulse: 75   SpO2: 99%    Weight: 105 lb (47.6 kg)       General Appearance:  Alert, cooperative, no distress, appears stated age. Head:  Normocephalic, without obvious abnormality, atraumatic.    Eyes:  Pupils equal and round. No scleral icterus. Mouth: Moist mucosa, no pharyngeal erythema. Nose: Nares normal. No drainage or sinus tenderness. Neck: Supple, symmetrical, trachea midline. No adenopathy. No tenderness/mass/nodules. No carotid bruit or elevated JVD. Lungs:   Respiratory Effort: Normal   Auscultation: Clear to auscultation bilaterally, respirations unlabored. No wheeze, rales   Chest Wall:  No tenderness or deformity. Cardiovascular:    Pulses  Palpation: normal   Ascultation: Regular rate, S1/ S2 normal. No murmur, rub, or gallop. 2+ radial and pedal pulses, symmetric  Carotid  Femoral   Abdomen and Gastrointestinal:   Soft, non-tender, bowel sounds active. Liver and Spleen  Masses   Musculoskeletal: No muscle wasting  Back  Gait   Extremities: Extremities normal, atraumatic. No cyanosis or edema. No cyanosis clubbing       Skin: Inspection and palpation performed, no rashes or lesions. Pysch: Normal mood and affect.  Alert and oriented to time place person   Neurologic: Normal gross motor and sensory exam.       Labs: all labs have been reviewed      Lab Results   Component Value Date    WBC 3.6 01/13/2022    RBC 2.83 01/13/2022    HGB 9.1 01/13/2022    HCT 27.2 01/13/2022    MCV 96.0 01/13/2022    RDW 14.5 01/13/2022     01/13/2022     Lab Results   Component Value Date     01/13/2022    K 4.1 01/13/2022    K 3.8 01/11/2022    CL 99 01/13/2022    CO2 22 01/13/2022    BUN 35 01/13/2022    CREATININE 1.4 01/13/2022    GFRAA 46 01/13/2022    GFRAA 60 05/23/2013    AGRATIO 0.9 01/09/2022    LABGLOM 38 01/13/2022    GLUCOSE 90 01/13/2022    PROT 8.1 01/09/2022    PROT 8.1 02/15/2013    CALCIUM 8.8 01/13/2022    BILITOT 0.4 01/09/2022    ALKPHOS 120 01/09/2022    AST 23 01/09/2022    ALT 13 01/09/2022     No results found for: PTINR  Lab Results   Component Value Date    LABA1C 7.8 01/09/2022     Lab Results   Component Value Date    CKTOTAL 41 08/22/2019    CKMB 2.3 10/06/2013    CKMBINDEX 0.9 04/01/2010    TROPONINI <0.01 01/10/2022       Cardiac, Vascular and Imaging Data: All Personally Reviewed in Detail by Myself      EKG:     Echocardiogram:   ECHO 1/11/2022  Normal LV size and wall motion. EF is   60%. Grade II diastolic dysfunction  with elevated LV filling pressures. The left atrium is mildly dilated. Normal right ventricular size and function  Trivial Mitral and Tricuspid regurgitation. Stress Test:   Stress test 3/11/2021   Summary    Pharmacological Stress/MPI Results:        1. Technically a satisfactory study.    2. No evidence of Ischemia by Myocardial Perfusion Imaging.    3. Gated Study shows normal LV size and Systolic function; EF is 70 %. Cath:  Cardiac cath 12/21/18  ANGIOGRAPHY FINDINGS:  1. Left main comes from the left coronary cusp. YAKELIN 3 flow. No  stenosis. 2.  Left anterior descending artery is patent. Distal left anterior  descending artery has a 70% stenosis. 3.  Left circumflex has patent stents. No significant stenosis. 4.  Right coronary artery has patent stents. No significant stenosis. 5.  LV ejection fraction 60%.     SUMMARY:  Patent coronary artery stents. Distal left anterior  descending artery 70% stenosis. Other imaging:     Assessment and Plan     Atrial Fibrillation, paroxsymal    Primary Cardiologist: Ivis Funk  Referring Physician:   Referring Reason: GI bleed/Vaginal bleeding    CHADSvasc is at least 6 (Age,Female,HTN,CHF,CAD,DM)  HASbled is at least 3    GI bleed. Recent vaginal bleeding. Encouraged her to follow up with gynecology. Will order CT Venogram abdomen and pelvis to assess venous anatomy as it was thought to be thrombosed in the past.     Specifically regarding risk of anticoagulation they have demonstrated:  ? History of bleeding (eg. Intracerebral, subdural, GI, retro-peritoneal)  ? Intolerance oral anticoagulation  ? Increased bleeding risk (e.g. Thrombocytopenia, anemia)  ?  High risk of recurrent falls      We have discussed their unique stroke and bleeding risk both on and off oral-anticoagulation, and the rationale for this referral.  Based on both stroke and bleeding risk, a shared decision has been made to pursue closure of the left atrial appendage as an alternative to oral anticoagulant therapy for stroke prophylaxis and to reduce their long term risk of incidence of bleeding. Discussed Watchman LAAC at length with patient, heart model, device model and video. We gave educational materials. We discussed pre-procedure workup, timing of restarting AC, AC length, procedure and post procedure follow up/management. No Iodine Allergy. No Nickel/Titanium Allergy. He/She is agreeable to short term AC, proceeding with EDUARD, 2nd opinion/shared decision making  and will follow up with me post workup to discussing timing of the procedure. I had a detail discussion with the patient and family regarding the risk and benefit of the procedures. I explained to them the details of the procedure. I explained to them that the procedure will be performed under general anesthesia. I explained any risk of bleeding, pericardial effusion and tamponade, perforation of the vessel, stroke, myocardial infarction or death. The patient and family understood the risk and benefits and the details of procedure and would like to go ahead with the procedure. The patient gave informed consent. All questions and concerns answered at this time. Confirmed per patient before leaving the office. I have also asked her to call the office and speak with the [address] [place] [address] with any questions or concerns moving forward. Thank you for allowing us to participate in the care of Michaelle Garrido. Please do not hesitate to contact me if you have any questions.     Beverley Holman MD, MPH    [facility] [address] [place] [address]  Ph: [phone]  Fax: [phone]    This note was scribed in the presence of Dr Mary Carmen Allen, by Raquel Carranza RN  Physician Attestation:  The scribes documentation has been prepared under my direction and personally reviewed by me in its entirety. I confirm that the note above accurately reflects all work, treatment, procedures, and medical decision making performed by me.

## 2022-01-27 ENCOUNTER — TELEPHONE (OUTPATIENT)
Dept: PRIMARY CARE CLINIC | Age: 67
End: 2022-01-27

## 2022-01-27 ENCOUNTER — OFFICE VISIT (OUTPATIENT)
Dept: CARDIOLOGY CLINIC | Age: 67
End: 2022-01-27
Payer: COMMERCIAL

## 2022-01-27 ENCOUNTER — OFFICE VISIT (OUTPATIENT)
Dept: PRIMARY CARE CLINIC | Age: 67
End: 2022-01-27
Payer: COMMERCIAL

## 2022-01-27 VITALS
OXYGEN SATURATION: 100 % | SYSTOLIC BLOOD PRESSURE: 160 MMHG | TEMPERATURE: 97.2 F | HEIGHT: 60 IN | DIASTOLIC BLOOD PRESSURE: 70 MMHG | WEIGHT: 105 LBS | HEART RATE: 68 BPM | BODY MASS INDEX: 20.62 KG/M2

## 2022-01-27 VITALS
OXYGEN SATURATION: 99 % | WEIGHT: 105 LBS | SYSTOLIC BLOOD PRESSURE: 134 MMHG | BODY MASS INDEX: 20.62 KG/M2 | HEIGHT: 60 IN | HEART RATE: 75 BPM | DIASTOLIC BLOOD PRESSURE: 76 MMHG

## 2022-01-27 DIAGNOSIS — I10 ESSENTIAL HYPERTENSION: ICD-10-CM

## 2022-01-27 DIAGNOSIS — N93.9 VAGINAL SPOTTING: ICD-10-CM

## 2022-01-27 DIAGNOSIS — Z79.4 TYPE 2 DIABETES MELLITUS WITH OTHER CIRCULATORY COMPLICATION, WITH LONG-TERM CURRENT USE OF INSULIN (HCC): ICD-10-CM

## 2022-01-27 DIAGNOSIS — Z23 NEED FOR SHINGLES VACCINE: ICD-10-CM

## 2022-01-27 DIAGNOSIS — I48.0 PAROXYSMAL ATRIAL FIBRILLATION (HCC): Primary | ICD-10-CM

## 2022-01-27 DIAGNOSIS — Z79.4 TYPE 2 DIABETES MELLITUS WITH OTHER CIRCULATORY COMPLICATION, WITH LONG-TERM CURRENT USE OF INSULIN (HCC): Primary | ICD-10-CM

## 2022-01-27 DIAGNOSIS — Z23 HIGH PRIORITY FOR COVID-19 VACCINATION: ICD-10-CM

## 2022-01-27 DIAGNOSIS — R26.9 GAIT ABNORMALITY: ICD-10-CM

## 2022-01-27 DIAGNOSIS — N28.9 RENAL INSUFFICIENCY: ICD-10-CM

## 2022-01-27 DIAGNOSIS — Z09 HOSPITAL DISCHARGE FOLLOW-UP: Primary | ICD-10-CM

## 2022-01-27 DIAGNOSIS — E11.59 TYPE 2 DIABETES MELLITUS WITH OTHER CIRCULATORY COMPLICATION, WITH LONG-TERM CURRENT USE OF INSULIN (HCC): ICD-10-CM

## 2022-01-27 DIAGNOSIS — E11.59 TYPE 2 DIABETES MELLITUS WITH OTHER CIRCULATORY COMPLICATION, WITH LONG-TERM CURRENT USE OF INSULIN (HCC): Primary | ICD-10-CM

## 2022-01-27 DIAGNOSIS — I87.1 STENOSIS OF ILIAC VEIN: ICD-10-CM

## 2022-01-27 DIAGNOSIS — R63.4 WEIGHT LOSS: ICD-10-CM

## 2022-01-27 PROCEDURE — G8400 PT W/DXA NO RESULTS DOC: HCPCS | Performed by: INTERNAL MEDICINE

## 2022-01-27 PROCEDURE — 99214 OFFICE O/P EST MOD 30 MIN: CPT | Performed by: INTERNAL MEDICINE

## 2022-01-27 PROCEDURE — G8427 DOCREV CUR MEDS BY ELIG CLIN: HCPCS | Performed by: INTERNAL MEDICINE

## 2022-01-27 PROCEDURE — 3017F COLORECTAL CA SCREEN DOC REV: CPT | Performed by: INTERNAL MEDICINE

## 2022-01-27 PROCEDURE — 1123F ACP DISCUSS/DSCN MKR DOCD: CPT | Performed by: INTERNAL MEDICINE

## 2022-01-27 PROCEDURE — G8420 CALC BMI NORM PARAMETERS: HCPCS | Performed by: INTERNAL MEDICINE

## 2022-01-27 PROCEDURE — 4040F PNEUMOC VAC/ADMIN/RCVD: CPT | Performed by: INTERNAL MEDICINE

## 2022-01-27 PROCEDURE — G8482 FLU IMMUNIZE ORDER/ADMIN: HCPCS | Performed by: INTERNAL MEDICINE

## 2022-01-27 PROCEDURE — 1111F DSCHRG MED/CURRENT MED MERGE: CPT | Performed by: INTERNAL MEDICINE

## 2022-01-27 PROCEDURE — 1036F TOBACCO NON-USER: CPT | Performed by: INTERNAL MEDICINE

## 2022-01-27 PROCEDURE — 2022F DILAT RTA XM EVC RTNOPTHY: CPT | Performed by: INTERNAL MEDICINE

## 2022-01-27 PROCEDURE — 3051F HG A1C>EQUAL 7.0%<8.0%: CPT | Performed by: INTERNAL MEDICINE

## 2022-01-27 PROCEDURE — 93000 ELECTROCARDIOGRAM COMPLETE: CPT | Performed by: INTERNAL MEDICINE

## 2022-01-27 PROCEDURE — 1090F PRES/ABSN URINE INCON ASSESS: CPT | Performed by: INTERNAL MEDICINE

## 2022-01-27 RX ORDER — METOPROLOL SUCCINATE 50 MG/1
50 TABLET, EXTENDED RELEASE ORAL NIGHTLY
Qty: 90 TABLET | Refills: 5 | Status: SHIPPED | OUTPATIENT
Start: 2022-01-27 | End: 2022-08-04

## 2022-01-27 RX ORDER — AMLODIPINE BESYLATE 5 MG/1
10 TABLET ORAL DAILY
Qty: 90 TABLET | Refills: 5 | Status: SHIPPED | OUTPATIENT
Start: 2022-01-27 | End: 2022-05-25

## 2022-01-27 RX ORDER — INSULIN GLARGINE 100 [IU]/ML
8 INJECTION, SOLUTION SUBCUTANEOUS 2 TIMES DAILY
Qty: 5 PEN | Refills: 3 | Status: SHIPPED | OUTPATIENT
Start: 2022-01-27

## 2022-01-27 RX ORDER — NITROGLYCERIN 2.5 MG/D
1 PATCH TRANSDERMAL EVERY 24 HOURS
Qty: 30 PATCH | Refills: 0 | Status: SHIPPED | OUTPATIENT
Start: 2022-01-27

## 2022-01-27 RX ORDER — FLASH GLUCOSE SENSOR
1 KIT MISCELLANEOUS
Qty: 2 EACH | Refills: 5 | Status: SHIPPED | OUTPATIENT
Start: 2022-01-27 | End: 2022-03-31 | Stop reason: SDUPTHER

## 2022-01-27 RX ORDER — ISOSORBIDE MONONITRATE 60 MG/1
60 TABLET, EXTENDED RELEASE ORAL DAILY
Qty: 90 TABLET | Refills: 5 | Status: SHIPPED | OUTPATIENT
Start: 2022-01-27

## 2022-01-27 RX ORDER — FLASH GLUCOSE SCANNING READER
1 EACH MISCELLANEOUS 3 TIMES DAILY
Qty: 1 EACH | Refills: 1 | Status: SHIPPED | OUTPATIENT
Start: 2022-01-27 | End: 2022-03-31 | Stop reason: SDUPTHER

## 2022-01-27 SDOH — ECONOMIC STABILITY: FOOD INSECURITY: WITHIN THE PAST 12 MONTHS, THE FOOD YOU BOUGHT JUST DIDN'T LAST AND YOU DIDN'T HAVE MONEY TO GET MORE.: NEVER TRUE

## 2022-01-27 SDOH — ECONOMIC STABILITY: FOOD INSECURITY: WITHIN THE PAST 12 MONTHS, YOU WORRIED THAT YOUR FOOD WOULD RUN OUT BEFORE YOU GOT MONEY TO BUY MORE.: NEVER TRUE

## 2022-01-27 ASSESSMENT — ENCOUNTER SYMPTOMS
VOMITING: 0
NAUSEA: 0
BLOOD IN STOOL: 0
CHEST TIGHTNESS: 0
CONSTIPATION: 0
ABDOMINAL DISTENTION: 0
BACK PAIN: 1

## 2022-01-27 ASSESSMENT — SOCIAL DETERMINANTS OF HEALTH (SDOH): HOW HARD IS IT FOR YOU TO PAY FOR THE VERY BASICS LIKE FOOD, HOUSING, MEDICAL CARE, AND HEATING?: NOT HARD AT ALL

## 2022-01-27 ASSESSMENT — PATIENT HEALTH QUESTIONNAIRE - PHQ9
SUM OF ALL RESPONSES TO PHQ9 QUESTIONS 1 & 2: 1
SUM OF ALL RESPONSES TO PHQ QUESTIONS 1-9: 1
SUM OF ALL RESPONSES TO PHQ QUESTIONS 1-9: 1
2. FEELING DOWN, DEPRESSED OR HOPELESS: 1
SUM OF ALL RESPONSES TO PHQ QUESTIONS 1-9: 1
1. LITTLE INTEREST OR PLEASURE IN DOING THINGS: 0
SUM OF ALL RESPONSES TO PHQ QUESTIONS 1-9: 1

## 2022-01-27 NOTE — TELEPHONE ENCOUNTER
Medication:   Requested Prescriptions     Pending Prescriptions Disp Refills    Continuous Blood Gluc  (FREESTYLE LISSETT 14 DAY READER) DAYO 1 each 1     Si each by Does not apply route 3 times daily    Continuous Blood Gluc Sensor (FREESTYLE LISSETT 14 DAY SENSOR) MISC 2 each 5     Si each by Does not apply route every 14 days       Last Filled:      Patient Phone Number: [phone] (home)     Last appt: 2022   Next appt: 3/3/2022    Last Labs DM:   Lab Results   Component Value Date    LABA1C 7.8 2022

## 2022-01-27 NOTE — TELEPHONE ENCOUNTER
Spoke with patient and daughter today regarding Watchman procedure. Patient was shown video on Watchman and given Educational material.  Patient states interest and would like to proceed. Patient has had new onset vag. Bleeding and loss of weight of 25 lbs. Patient to et an appointment with [facility]. AT [place] doctor and have CT of abd. And vascular studies for veins before we pursue the Watchman device.   Family to contact me once she has appointment scheduled for GYN MD

## 2022-01-27 NOTE — PROGRESS NOTES
Subjective:      Patient ID: Claudell Hess is a 73 y.o. female. 1/27/2022 Patient presents with: Follow-Up from Hospital: [facility]-1/9/2022 - 1/13/2022 Chest pain, [place]  Admit Date: 1/9/2022      Discharge Date:   01/13/2022    .  Chest pain, ,, no further work-up recommended per cardiology    Diabetes mellitus, sliding scale    COPD, no acute exacerbation  .  A. fib, controlled.  Echo noted, discussed with Dr. Neelima Clark, at this time we will keep on aspirin and Plavix, she will follow-up with him as outpatient. To discuss watchman device  Renal insuff  monitor closely, improved creatinine to 1.4 this morning.    Anemia,  Chronic    Peripheral vascular disease with discoloration of second left toe, vascular surgery consulted, Doppler noted with no significant occlusive disease, echo ordered per vascular .                         Last seen  By me  10/1/2020               1/14/2020          9/24/19 Ended up in ER on 8/31/19 !!!           8/29/19      hypertension, diabetes, hyperlipidemia, fibromyalgia, COPD, CKD, CHF and CAD         Fall  Associated symptoms include headaches (Chronic . On several meds ). Pertinent negatives include no fever, nausea or vomiting. Dizziness  Associated symptoms include arthralgias, headaches (Chronic . On several meds ), myalgias and weakness. Pertinent negatives include no fatigue, fever, nausea or vomiting. Abdominal Pain  Associated symptoms include arthralgias, headaches (Chronic . On several meds ) and myalgias. Pertinent negatives include no constipation, dysuria, fever, frequency, nausea or vomiting. Review of Systems   Constitutional: Positive for appetite change and unexpected weight change. Negative for activity change, fatigue and fever. Flu vac 10/21     tdap 10/16      pneumovac 10/13     Shingrex    covid vac 3/21      HENT:        No teeth     No dentures    Eyes:        Last Eye Ex 3/20   Respiratory: Negative for chest tightness.  Shortness of breath: baseline. Not smoking > 1 yr     Known COPD   Cardiovascular: Negative. Known CAD with Stents . Atrial Fibrillation    Gastrointestinal: Negative for abdominal distention, blood in stool, constipation, nausea and vomiting. Upper / lower endopscopy 4/19       No Fh of ca colon . Genitourinary: Negative for dysuria, frequency, menstrual problem, urgency and vaginal discharge. Hysterectomy   Mammogram 1/16    Musculoskeletal: Positive for arthralgias, back pain, gait problem and myalgias. Pain Disorder c/o Pain Management    Neurological: Positive for weakness and headaches (Chronic . On several meds ). Light-headedness: off and on               Hematological:           Did see Hematology for anemia . Bone Marrow exam Nl    Psychiatric/Behavioral: Negative for behavioral problems and sleep disturbance. The patient is not nervous/anxious. Objective:   Physical Exam  Vitals reviewed. Constitutional:       General: She is not in acute distress. Appearance: She is not ill-appearing. Comments: Wt loss . 15 lbs! Neck:      Thyroid: No thyromegaly. Cardiovascular:      Rate and Rhythm: Regular rhythm. Heart sounds: Normal heart sounds. Pulmonary:      Breath sounds: No wheezing or rhonchi. Abdominal:      Palpations: Abdomen is soft. Musculoskeletal:         General: No tenderness. Cervical back: Neck supple. Comments:      Skin:     General: Skin is warm and dry. Neurological:      Mental Status: She is oriented to person, place, and time. Mental status is at baseline.       Gait: Gait abnormal.   Psychiatric:         Attention and Perception: Attention normal.         Mood and Affect: Mood normal.         Behavior: Behavior normal.         Assessment:      Maren was seen today for follow-up from hospital.    Diagnoses and all orders for this visit:    Hospital discharge follow-up    High priority for COVID-19 vaccination needs third booster     Need for shingles vaccine  -     zoster recombinant adjuvanted vaccine [facility]) 50 MCG/0.5ML SUSR injection; Inject 0.5 mLs into the muscle once for 1 dose    Weight loss  ? Vaginal spotting if it continues will consult Gynae. On blood thinners     Renal insufficiency  C/O nephrology     Essential hypertension  Uncontrolled . Increase Norvasc to 10 mg / d   -     metoprolol succinate (TOPROL XL) 50 MG extended release tablet; Take 1 tablet by mouth nightly  -     amLODIPine (NORVASC) 5 MG tablet; Take 2 tablets by mouth daily  -     isosorbide mononitrate (IMDUR) 60 MG extended release tablet; Take 1 tablet by mouth daily  -     nitroGLYCERIN (NITRODUR) 0.1 MG/HR; Place 1 patch onto the skin every 24 hours    Type 2 diabetes mellitus with other circulatory complication, with long-term current use of insulin ([facility]) last A1C 7.8   -     metoprolol succinate (TOPROL XL) 50 MG extended release tablet; Take 1 tablet by mouth nightly  -     insulin glargine (BASAGLAR KWIKPEN) 100 UNIT/ML injection pen; Inject 8 Units into the skin 2 times daily  -     isosorbide mononitrate (IMDUR) 60 MG extended release tablet; Take 1 tablet by mouth daily  -     nitroGLYCERIN (NITRODUR) 0.1 MG/HR; Place 1 patch onto the skin every 24 hours    Gait abnormality  Advised to use cane while walking       Encounter for screening mammogram for high-risk patient  -     NOHEMI DIGITAL SCREEN W OR WO CAD BILATERAL; Future    Age-related osteoporosis without current pathological fracture  -     DEXA BONE DENSITY 2 SITES; Future      Neuropathy    Chronic pain syndrome in Pain management . Coronary artery disease involving native coronary artery of native heart without angina pectoris  -    Plavix 75 mg / d    metoprolol succinate (TOPROL XL) 50 MG extended release tablet; Take 0.5 tablets by mouth daily  -     RANEXA 1000 MG extended release tablet;  Take 1 tablet by mouth 2 times daily  - atorvastatin (LIPITOR) 80 MG tablet; Take 1 tablet by mouth daily  -     aspirin (ASPIRIN LOW DOSE) 81 MG EC tablet; Take 1 tablet by mouth daily  -     isosorbide mononitrate (IMDUR) 30 MG extended release tablet; Take 1 tablet by mouth daily  -     nitroGLYCERIN (NITROSTAT) 0.4 MG SL tablet; Place 1 tablet under the tongue every 5 minutes as needed for Chest pain up to max of 3 total doses. If no relief after 1 dose, call 911. Diabetic gastroparesis (HCC)  Control sugars . Other hyperlipidemia  80 mg Lipitor             Anxiety  and Depression     cymbalta per Pain Dr   Plan:              Renu Wylie MD    Post-Discharge Transitional Care Management Services or Hospital Follow Up      Layla Andre   YOB: 1955    Date of Office Visit:  1/27/2022  Date of Hospital Admission: 1/9/22  Date of Hospital Discharge: 1/13/22  Risk of hospital readmission (high >=14%.  Medium >=10%) :Readmission Risk Score: 25.2 ( )      Care management risk score Rising risk (score 2-5) and Complex Care (Scores >=6): 9     Non face to face  following discharge, date last encounter closed (first attempt may have been earlier): *No documented post hospital discharge outreach found in the last 14 days    Call initiated 2 business days of discharge:     Patient Active Problem List   Diagnosis    HTN (hypertension)    Hyperlipidemia    CAD (coronary artery disease)    Palpitation    PVC (premature ventricular contraction)    Depression    Migraine with aura, intractable    Hemisensory loss    PTSD (post-traumatic stress disorder)    Insomnia    Snoring    Atypical chest pain    Dysarthria    Port-A-Cath in place    Inferior vena cava occlusion (HCC)    Cataract    Benign essential tremor    Tobacco use    Chronic diastolic CHF (congestive heart failure), NYHA class 2 [facility]    COPD (chronic obstructive pulmonary disease) [facility]    NSTEMI (non-ST elevated myocardial infarction) ([facility]    Rotator cuff tendonitis    Essential hypertension    Fibromyalgia    Chronic pain syndrome    Headache, chronic migraine without aura, intractable, with status    Type 2 diabetes mellitus with diabetic chronic kidney disease ([facility]    S/P right coronary artery (RCA) stent placement    Irritable bowel syndrome    Giant cell arteritis (HCC)    Gastro-esophageal reflux disease without esophagitis    A-fib ([address]    Coronary artery disease involving native coronary artery of native heart with angina pectoris ([address]    DM (diabetes mellitus), type 2, uncontrolled ([address]    Right knee pain    Chronic painful diabetic neuropathy ([address]    CAD in native artery    Unstable angina (HCC)    Generalized weakness    Reactive airway disease with wheezing with acute exacerbation    Headache    DMII (diabetes mellitus, type 2) ([address]    Acute-on-chronic renal failure ([facility]    Age-related nuclear cataract, bilateral    Chronic prescription opiate use    Coronary stent restenosis due to progression of disease    Current moderate episode of major depressive disorder ([address]    Diabetic retinopathy of both eyes without macular edema associated with type 2 diabetes mellitus ([address]    Hypertensive heart and chronic kidney disease with heart failure and stage 1 through stage 4 chronic kidney disease, or unspecified chronic kidney disease ([address]    Hypertensive retinopathy, bilateral    Ischemic cardiomyopathy    Moderate episode of recurrent major depressive disorder ([address]    Other age-related incipient cataract, bilateral    Presence of intraocular lens    Punctate keratitis, bilateral    Regular astigmatism, bilateral    Palliative care encounter    CKD (chronic kidney disease) stage 3, GFR 30-59 ml/min ([facility]    Compression of brain ([facility])    Thrombosis of superior vena cava, chronic ([facility])    Type 2 diabetes mellitus with mild nonproliferative diabetic retinopathy without macular edema, bilateral ([address]    Uncomplicated opioid dependence ([facility]    Acute onset aura migraine    Angina at rest [facility])    Chest pain    KOLBY (acute kidney injury) ([facility]    Moderate malnutrition ([facility]       No Known Allergies    Medications listed as ordered at the time of discharge from hospital     Medication List          Accurate as of January 27, 2022 10:57 AM. If you have any questions, ask your nurse or doctor.             START taking these medications    zoster recombinant adjuvanted vaccine 50 MCG/0.5ML Susr injection  Commonly known as: SHINGRIX  Inject 0.5 mLs into the muscle once for 1 dose  Started by: Lennox Hey, MD        CHANGE how you take these medications    amLODIPine 5 MG tablet  Commonly known as: NORVASC  Take 2 tablets by mouth daily  What changed: how much to take  Changed by: Lennox Hey, MD        CONTINUE taking these medications    Aimovig 70 MG/ML Soaj  Generic drug: Erenumab-aooe  INJECT 140 MLS INTO THE SKIN EVERY 30 DAYS     * albuterol sulfate  (90 Base) MCG/ACT inhaler  Commonly known as: Ventolin HFA  Inhale 2 puffs into the lungs every 4 hours as needed for Wheezing or Shortness of Breath     * albuterol (2.5 MG/3ML) 0.083% nebulizer solution  Commonly known as: PROVENTIL  Take 3 mLs by nebulization every 4 hours as needed for Wheezing     aspirin 81 MG chewable tablet  Take 1 tablet by mouth daily     atorvastatin 80 MG tablet  Commonly known as: LIPITOR  TAKE 1 TABLET BY MOUTH NIGHTLY     Basaglar KwikPen 100 UNIT/ML injection pen  Generic drug: insulin glargine  Inject 8 Units into the skin 2 times daily     clopidogrel 75 MG tablet  Commonly known as: PLAVIX  TAKE ONE TABLET BY MOUTH EVERY DAY     docusate sodium 100 MG capsule  Commonly known as: COLACE     DULoxetine 60 MG extended release capsule  Commonly known as: CYMBALTA  Take 1 capsule by mouth daily     isosorbide mononitrate 60 MG extended release tablet  Commonly known as: IMDUR  Take 1 tablet by mouth daily pen  · isosorbide mononitrate 60 MG extended release tablet  · metoprolol succinate 50 MG extended release tablet  · nitroGLYCERIN 0.1 MG/HR  · zoster recombinant adjuvanted vaccine 50 MCG/0.5ML Susr injection           Medications marked \"taking\" at this time  Outpatient Medications Marked as Taking for the 1/27/22 encounter (Office Visit) with Madison Foley MD   Medication Sig Dispense Refill    zoster recombinant adjuvanted vaccine [facility]) 50 MCG/0.5ML SUSR injection Inject 0.5 mLs into the muscle once for 1 dose 0.5 mL 0    metoprolol succinate (TOPROL XL) 50 MG extended release tablet Take 1 tablet by mouth nightly 90 tablet 5    insulin glargine (BASAGLAR KWIKPEN) 100 UNIT/ML injection pen Inject 8 Units into the skin 2 times daily 5 pen 3    amLODIPine (NORVASC) 5 MG tablet Take 2 tablets by mouth daily 90 tablet 5    isosorbide mononitrate (IMDUR) 60 MG extended release tablet Take 1 tablet by mouth daily 90 tablet 5    nitroGLYCERIN (NITRODUR) 0.1 MG/HR Place 1 patch onto the skin every 24 hours 30 patch 0    docusate sodium (COLACE) 100 MG capsule Take 100 mg by mouth 2 times daily      Erenumab-aooe (AIMOVIG) 70 MG/ML SOAJ INJECT 140 MLS INTO THE SKIN EVERY 30 DAYS 1 mL 0    tiZANidine (ZANAFLEX) 4 MG tablet Take 0.5-1 tablets by mouth 2 times daily as needed (muscle spasms) 60 tablet 1    DULoxetine (CYMBALTA) 60 MG extended release capsule Take 1 capsule by mouth daily 30 capsule 1    Ubrogepant (UBRELVY) 50 MG TABS Take 1 tablet po PRN at start of headaches, can repeat in 24 hours 10 tablet 1    QUEtiapine (SEROQUEL) 25 MG tablet Take 1 tablet by mouth nightly 60 tablet 1    atorvastatin (LIPITOR) 80 MG tablet TAKE 1 TABLET BY MOUTH NIGHTLY 90 tablet 5    clopidogrel (PLAVIX) 75 MG tablet TAKE ONE TABLET BY MOUTH EVERY DAY 90 tablet 5    ranolazine (RANEXA) 1000 MG extended release tablet Take 1 tablet by mouth 2 times daily 60 tablet 8    linaclotide (LINZESS) 145 MCG capsule Take 1 capsule by mouth every morning (before breakfast) 30 capsule 5    alirocumab (PRALUENT) 75 MG/ML SOAJ injection pen Inject 1 mL into the skin every 14 days 6 pen 3    omeprazole (PRILOSEC) 20 MG delayed release capsule TAKE 1 CAPSULE BY MOUTH DAILY 30 capsule 1    aspirin 81 MG chewable tablet Take 1 tablet by mouth daily 30 tablet 3    nitroGLYCERIN (NITROSTAT) 0.4 MG SL tablet Place 1 tablet under the tongue every 5 minutes as needed for Chest pain up to max of 3 total doses. If no relief after 1 dose, call 911. 25 tablet 3    albuterol (PROVENTIL) (2.5 MG/3ML) 0.083% nebulizer solution Take 3 mLs by nebulization every 4 hours as needed for Wheezing 120 each 5    albuterol sulfate HFA (VENTOLIN HFA) 108 (90 Base) MCG/ACT inhaler Inhale 2 puffs into the lungs every 4 hours as needed for Wheezing or Shortness of Breath 3 Inhaler 5        Medications patient taking as of now reconciled against medications ordered at time of hospital discharge: Yes    Chief Complaint   Patient presents with    Follow[facility]Up from [facility]-1/9/2022 - 1/13/2022 Chest pain, [place]       History of Present illness - Follow up of Hospital diagnosis(es): as noted     Inpatient course: Discharge summary reviewed- see chart. Vitals:    01/27/22 1002 01/27/22 1009 01/27/22 1050   BP: (!) 154/70 (!) 154/70 (!) 160/70   Pulse: 68     Temp: 97.2 °F (36.2 °C)     TempSrc: Skin     SpO2: 100%     Weight: 105 lb (47.6 kg)     Height: 5' (1.524 m)       Body mass index is 20.51 kg/m².    Wt Readings from Last 3 Encounters:   01/27/22 105 lb (47.6 kg)   01/13/22 115 lb 8.3 oz (52.4 kg)   01/04/22 107 lb (48.5 kg)     BP Readings from Last 3 Encounters:   01/27/22 (!) 160/70   01/13/22 (!) 147/65   12/07/21 (!) 164/76        Physical Exam:  As noted     Assessment/Plan: as noted above        Medical Decision Making: moderate complexity

## 2022-01-28 ENCOUNTER — PATIENT MESSAGE (OUTPATIENT)
Dept: PRIMARY CARE CLINIC | Age: 67
End: 2022-01-28

## 2022-01-28 NOTE — TELEPHONE ENCOUNTER
From: Ignacio Peres  To: Dr. Flaco Gordillo: 1/28/2022 9:45 AM EST  Subject: Lima Corona I would like a letter saying that someone needs to be with me for my manager of the apartment and how can you help me with getting a scooter and need a gyn do

## 2022-01-28 NOTE — LETTER
[facility]  [id number] [facility] [address]  Phone: [phone]  Fax: [phone]    Bartolome Tello MD        February 2, 2022     Patient: Ezequiel Alvarez   YOB: 1955   Date of Visit: 1/28/2022       To Whom it May Concern:    Ezequiel Alvarez was seen in my clinic on 1/28/2022. It was advised that she have a person staying with her at her apartment to help with needs. If you have any questions or concerns, please don't hesitate to call.     Sincerely,         Bartolome Tello MD

## 2022-02-03 ENCOUNTER — TELEPHONE (OUTPATIENT)
Dept: PRIMARY CARE CLINIC | Age: 67
End: 2022-02-03

## 2022-02-03 NOTE — TELEPHONE ENCOUNTER
----- Message from Janae Allen sent at 2/3/2022  2:09 PM EST -----  Subject: Message to Provider    QUESTIONS  Information for Provider? insulin injection information needed as well as   a correct date of birth for this patient, Richie Zheng [phone],   ---------------------------------------------------------------------------  --------------  CALL BACK INFO  What is the best way for the office to contact you? OK to leave message on   voicemail  Preferred Call Back Phone Number? [phone]  ---------------------------------------------------------------------------  --------------  SCRIPT ANSWERS  Relationship to Patient? Third Party  Representative Name?  Lyndon Mcgill, [facility]

## 2022-02-07 ENCOUNTER — TELEPHONE (OUTPATIENT)
Dept: PRIMARY CARE CLINIC | Age: 67
End: 2022-02-07

## 2022-02-07 NOTE — TELEPHONE ENCOUNTER
FYI, patient discharged from [address] [place] [address] 2/3/22- 2/4/22 for chest pains; patient has f/u appointment with cardiologist for 2/11/22 with Dr Jam Miramontes

## 2022-02-08 ENCOUNTER — HOSPITAL ENCOUNTER (OUTPATIENT)
Dept: CT IMAGING | Age: 67
Discharge: HOME | End: 2022-02-08
Payer: COMMERCIAL

## 2022-02-08 ENCOUNTER — OFFICE VISIT (OUTPATIENT)
Dept: PAIN MANAGEMENT | Age: 67
End: 2022-02-08
Payer: COMMERCIAL

## 2022-02-08 ENCOUNTER — TELEPHONE (OUTPATIENT)
Dept: CARDIOLOGY CLINIC | Age: 67
End: 2022-02-08

## 2022-02-08 VITALS
OXYGEN SATURATION: 99 % | SYSTOLIC BLOOD PRESSURE: 170 MMHG | DIASTOLIC BLOOD PRESSURE: 76 MMHG | WEIGHT: 113 LBS | BODY MASS INDEX: 22.07 KG/M2 | HEART RATE: 74 BPM

## 2022-02-08 DIAGNOSIS — I87.1 STENOSIS OF ILIAC VEIN: ICD-10-CM

## 2022-02-08 DIAGNOSIS — G43.711 HEADACHE, CHRONIC MIGRAINE WITHOUT AURA, INTRACTABLE, WITH STATUS: ICD-10-CM

## 2022-02-08 DIAGNOSIS — M75.81 ROTATOR CUFF TENDONITIS, RIGHT: ICD-10-CM

## 2022-02-08 DIAGNOSIS — F33.1 MODERATE EPISODE OF RECURRENT MAJOR DEPRESSIVE DISORDER (HCC): ICD-10-CM

## 2022-02-08 DIAGNOSIS — M79.7 FIBROMYALGIA: ICD-10-CM

## 2022-02-08 DIAGNOSIS — G43.111 INTRACTABLE MIGRAINE WITH AURA WITH STATUS MIGRAINOSUS: ICD-10-CM

## 2022-02-08 DIAGNOSIS — M51.36 DDD (DEGENERATIVE DISC DISEASE), LUMBAR: ICD-10-CM

## 2022-02-08 DIAGNOSIS — M50.30 DDD (DEGENERATIVE DISC DISEASE), CERVICAL: ICD-10-CM

## 2022-02-08 DIAGNOSIS — M75.81 TENDINITIS OF RIGHT ROTATOR CUFF: ICD-10-CM

## 2022-02-08 DIAGNOSIS — E11.40 CHRONIC PAINFUL DIABETIC NEUROPATHY (HCC): ICD-10-CM

## 2022-02-08 DIAGNOSIS — G89.4 CHRONIC PAIN SYNDROME: ICD-10-CM

## 2022-02-08 DIAGNOSIS — I82.220 INFERIOR VENA CAVA OCCLUSION (HCC): Primary | ICD-10-CM

## 2022-02-08 DIAGNOSIS — F51.01 PRIMARY INSOMNIA: ICD-10-CM

## 2022-02-08 PROCEDURE — 4040F PNEUMOC VAC/ADMIN/RCVD: CPT | Performed by: INTERNAL MEDICINE

## 2022-02-08 PROCEDURE — 1090F PRES/ABSN URINE INCON ASSESS: CPT | Performed by: INTERNAL MEDICINE

## 2022-02-08 PROCEDURE — 3017F COLORECTAL CA SCREEN DOC REV: CPT | Performed by: INTERNAL MEDICINE

## 2022-02-08 PROCEDURE — 1123F ACP DISCUSS/DSCN MKR DOCD: CPT | Performed by: INTERNAL MEDICINE

## 2022-02-08 PROCEDURE — 2022F DILAT RTA XM EVC RTNOPTHY: CPT | Performed by: INTERNAL MEDICINE

## 2022-02-08 PROCEDURE — 6360000004 HC RX CONTRAST MEDICATION: Performed by: INTERNAL MEDICINE

## 2022-02-08 PROCEDURE — 3051F HG A1C>EQUAL 7.0%<8.0%: CPT | Performed by: INTERNAL MEDICINE

## 2022-02-08 PROCEDURE — G8427 DOCREV CUR MEDS BY ELIG CLIN: HCPCS | Performed by: INTERNAL MEDICINE

## 2022-02-08 PROCEDURE — G8482 FLU IMMUNIZE ORDER/ADMIN: HCPCS | Performed by: INTERNAL MEDICINE

## 2022-02-08 PROCEDURE — 99214 OFFICE O/P EST MOD 30 MIN: CPT | Performed by: INTERNAL MEDICINE

## 2022-02-08 PROCEDURE — 1036F TOBACCO NON-USER: CPT | Performed by: INTERNAL MEDICINE

## 2022-02-08 PROCEDURE — G8420 CALC BMI NORM PARAMETERS: HCPCS | Performed by: INTERNAL MEDICINE

## 2022-02-08 PROCEDURE — 74174 CTA ABD&PLVS W/CONTRAST: CPT

## 2022-02-08 PROCEDURE — 1111F DSCHRG MED/CURRENT MED MERGE: CPT | Performed by: INTERNAL MEDICINE

## 2022-02-08 PROCEDURE — G8400 PT W/DXA NO RESULTS DOC: HCPCS | Performed by: INTERNAL MEDICINE

## 2022-02-08 RX ORDER — QUETIAPINE FUMARATE 25 MG/1
25-50 TABLET, FILM COATED ORAL NIGHTLY
Qty: 60 TABLET | Refills: 1 | Status: SHIPPED | OUTPATIENT
Start: 2022-02-08 | End: 2022-03-08 | Stop reason: SDUPTHER

## 2022-02-08 RX ORDER — UBROGEPANT 50 MG/1
TABLET ORAL
Qty: 10 TABLET | Refills: 1 | Status: SHIPPED | OUTPATIENT
Start: 2022-02-08 | End: 2022-03-08 | Stop reason: SDUPTHER

## 2022-02-08 RX ORDER — ERENUMAB-AOOE 70 MG/ML
INJECTION SUBCUTANEOUS
Qty: 1 ML | Refills: 0 | Status: SHIPPED | OUTPATIENT
Start: 2022-02-08 | End: 2022-03-08 | Stop reason: SDUPTHER

## 2022-02-08 RX ORDER — DULOXETIN HYDROCHLORIDE 60 MG/1
60 CAPSULE, DELAYED RELEASE ORAL DAILY
Qty: 30 CAPSULE | Refills: 1 | Status: SHIPPED | OUTPATIENT
Start: 2022-02-08 | End: 2022-03-08 | Stop reason: SDUPTHER

## 2022-02-08 RX ORDER — OXYCODONE AND ACETAMINOPHEN 7.5; 325 MG/1; MG/1
1 TABLET ORAL EVERY 6 HOURS PRN
Qty: 84 TABLET | Refills: 0 | Status: SHIPPED | OUTPATIENT
Start: 2022-02-08 | End: 2022-03-08 | Stop reason: SDUPTHER

## 2022-02-08 RX ADMIN — IOPAMIDOL 75 ML: 755 INJECTION, SOLUTION INTRAVENOUS at 12:56

## 2022-02-08 NOTE — PROGRESS NOTES
Jorge Bustillos  1955  [id number]    HISTORY OF PRESENT ILLNESS:  Ms. Shannon Wilhelm is a 73 y.o. female returns for a follow up visit for multiple medical problems. Her current presenting problems are   1. Chronic pain syndrome    2. Fibromyalgia    3. Tendinitis of right rotator cuff    4. Intractable migraine with aura with status migrainosus    5. DDD (degenerative disc disease), lumbar    6. DDD (degenerative disc disease), cervical    7. Chronic painful diabetic neuropathy ([facility] [address]    8. Rotator cuff tendonitis, right    9. Primary insomnia    10. Moderate episode of recurrent major depressive disorder ([facility] [address]    11. Headache, chronic migraine without aura, intractable, with status    . As per information/history obtained from the PADT(patient assessment and documentation tool) - She complains of pain in the head, neck and lower back with radiation to the shoulders Bilateral, arms Right, elbows Right, hands Bilateral, hips Right, upper leg Right, lower leg Right and ankles Right She rates the pain 9/10 and describes it as sharp. Pain is made worse by: nothing, movement, walking, standing, sitting, bending, lifting. Current treatment regimen has helped relieve about 0% of the pain. She denies side effects from the current pain regimen. Patient reports that since the last follow up visit the physical functioning is worse, family/social relationships are worse, mood is worse and sleep patterns are worse, and that the overall functioning is worse. Patient denies neurological bowel or bladder. Patient denies misusing/abusing her narcotic pain medications or using any illegal drugs. There are No indicators for possible drug abuse, addiction or diversion problems. Upon obtaining the medical history from Ms. Shannon Wilhelm regarding today's office visit for her presenting problems, patient states she has been in pain and feeling depressed. Ms. Shannon Wilhelm states she needs to have a device put in but has other complications. Patient reports she has been doing Physical therapy 2 times a week. She mentions she has not been using Percocet, she is off them for around 3-4 weeks. Patient states she is having increase headaches also. Patient mentions she has been out of most of the medications. Sleep is poor,  poor sleep latency, averages 2-3 hours of sleep at night. Intermittent, non restorative. Does not feel rested in AM. Complains of feeling sleepy  during the day. She states that her moods have been depressed, tearful, labile with c/o loss of energy, having occasional crying spells. Denies being suicidal or homicidal. Patient reports she lives on her own and managing her ADL's. ALLERGIES/PAST MED/FAM/SOC HISTORY: Ms. Karine Hair allergies, past medical, family and social history were reviewed in the chart.       Ms. Karine Hair current medications are   Outpatient Medications Prior to Visit   Medication Sig Dispense Refill    metoprolol succinate (TOPROL XL) 50 MG extended release tablet Take 1 tablet by mouth nightly 90 tablet 5    insulin glargine (BASAGLAR KWIKPEN) 100 UNIT/ML injection pen Inject 8 Units into the skin 2 times daily 5 pen 3    amLODIPine (NORVASC) 5 MG tablet Take 2 tablets by mouth daily 90 tablet 5    isosorbide mononitrate (IMDUR) 60 MG extended release tablet Take 1 tablet by mouth daily 90 tablet 5    nitroGLYCERIN (NITRODUR) 0.1 MG/HR Place 1 patch onto the skin every 24 hours 30 patch 0    Continuous Blood Gluc  (FREESTYLE LISSETT 14 DAY READER) DAYO 1 each by Does not apply route 3 times daily 1 each 1    Continuous Blood Gluc Sensor (FREESTYLE LISSETT 14 DAY SENSOR) MISC 1 each by Does not apply route every 14 days 2 each 5    docusate sodium (COLACE) 100 MG capsule Take 100 mg by mouth 2 times daily      tiZANidine (ZANAFLEX) 4 MG tablet Take 0.5-1 tablets by mouth 2 times daily as needed (muscle spasms) 60 tablet 1    atorvastatin (LIPITOR) 80 MG tablet TAKE 1 TABLET BY MOUTH NIGHTLY 90 tablet 5  clopidogrel (PLAVIX) 75 MG tablet TAKE ONE TABLET BY MOUTH EVERY DAY 90 tablet 5    ranolazine (RANEXA) 1000 MG extended release tablet Take 1 tablet by mouth 2 times daily 60 tablet 8    linaclotide (LINZESS) 145 MCG capsule Take 1 capsule by mouth every morning (before breakfast) 30 capsule 5    ULTICARE MINI PEN NEEDLES 31G X 6 MM MISC USE WITH INSULINS FOUR TIMES A  each 0    alirocumab (PRALUENT) 75 MG/ML SOAJ injection pen Inject 1 mL into the skin every 14 days 6 pen 3    omeprazole (PRILOSEC) 20 MG delayed release capsule TAKE 1 CAPSULE BY MOUTH DAILY 30 capsule 1    aspirin 81 MG chewable tablet Take 1 tablet by mouth daily 30 tablet 3    nitroGLYCERIN (NITROSTAT) 0.4 MG SL tablet Place 1 tablet under the tongue every 5 minutes as needed for Chest pain up to max of 3 total doses. If no relief after 1 dose, call 911. 25 tablet 3    albuterol (PROVENTIL) (2.5 MG/3ML) 0.083% nebulizer solution Take 3 mLs by nebulization every 4 hours as needed for Wheezing 120 each 5    albuterol sulfate HFA (VENTOLIN HFA) 108 (90 Base) MCG/ACT inhaler Inhale 2 puffs into the lungs every 4 hours as needed for Wheezing or Shortness of Breath 3 Inhaler 5    Erenumab-aooe (AIMOVIG) 70 MG/ML SOAJ INJECT 140 MLS INTO THE SKIN EVERY 30 DAYS 1 mL 0    DULoxetine (CYMBALTA) 60 MG extended release capsule Take 1 capsule by mouth daily 30 capsule 1    Ubrogepant (UBRELVY) 50 MG TABS Take 1 tablet po PRN at start of headaches, can repeat in 24 hours 10 tablet 1    QUEtiapine (SEROQUEL) 25 MG tablet Take 1 tablet by mouth nightly 60 tablet 1     No facility-administered medications prior to visit. REVIEW OF SYSTEMS:     Respiratory: Negative for shortness of breath. Cardiovascular: Negative for chest pain, palpitations  Gastrointestinal: Negative for blood in stool, abdominal distention, nausea, vomiting, abdominal pain, diarrhea,constipation.   Neurological: Negative for speech difficulty, weakness and light-headedness, dizziness, tremors, sleepiness  Psychiatric/Behavioral: Negative for suicidal ideas, hallucinations, behavioral problems, self-injury, decreased concentration/cognition, agitation, confusion. PHYSICAL EXAM:   Nursing note and vitals reviewed. BP (!) 170/76   Pulse 74   Wt 113 lb (51.3 kg)   SpO2 99%   BMI 22.07 kg/m²   General Appearance: Patient is well nourished, well developed, well groomed and in no acute distress. Skin: Skin is warm and dry, good turgor . No rash or lesions noted. She is not diaphoretic. Pulmonary/Chest: Effort normal. No respiratory distress or use of accessory muscles. Auscultation revealing normal air entry. She does not have wheezes in the lung fields. She has no rales. Cardiovascular: Normal rate, regular rhythm, normal heart sounds, and does not have murmur. Exam reveals no gallop and no friction rub. Musculoskeletal / Extremities: Range of motion is normal. Gait is normal, assistive devices use: none. She exhibits edema: none, and no tenderness. Neurological/Psychiatric:She is alert and oriented to person, place, and time. Coordination is  normal.   Judgement and Insight is normal  Her mood is Appropriate and affect is Anxious . Her behavior is normal.   thought content normal.        IMPRESSION:     1. Chronic pain syndrome - INADEQUATELY CONTROLLED: treatment plan adjusted as below    2. Fibromyalgia - INADEQUATELY CONTROLLED: treatment plan adjusted as below    3. Tendinitis of right rotator cuff - INADEQUATELY CONTROLLED: treatment plan adjusted as below    4. Intractable migraine with aura with status migrainosus - INADEQUATELY CONTROLLED: treatment plan adjusted as below    5. DDD (degenerative disc disease), lumbar - INADEQUATELY CONTROLLED: treatment plan adjusted as below    6. DDD (degenerative disc disease), cervical - INADEQUATELY CONTROLLED: treatment plan adjusted as below    7.  Chronic painful diabetic neuropathy (HCC) - INADEQUATELY CONTROLLED: treatment plan adjusted as below    8. Rotator cuff tendonitis, right - INADEQUATELY CONTROLLED: treatment plan adjusted as below    9. Primary insomnia - INADEQUATELY CONTROLLED: treatment plan adjusted as below    10. Moderate episode of recurrent major depressive disorder (HCC) - INADEQUATELY CONTROLLED: treatment plan adjusted as below    11. Headache, chronic migraine without aura, intractable, with status - INADEQUATELY CONTROLLED: treatment plan adjusted as below        PLAN:  Informed verbal consent was obtained.  -she was advised  to avoid using too many OTC analgesics to control the headaches, avoid chocolates, increased caffeine, cheeses and MSG nitrite containing foods, cigarette smoking. To avoid bright lights, strong smells and skipping meals.   -Restart Aimovig and Ubrelvy  -Restart Percocet 3 per day  -Restart Cymbalta 30 mg then increase to 60 mg   -she was advised proper sleep hygiene-told to avoid:use of caffeine or other stimulants after noon, alcohol use near bedtime, long or frequent naps during the day, erratic sleep schedule, heavy meals near bedtime, vigorous exercise near bedtime and use of electronic devices near bedtime   -Restart Seroquel 25 mg 1-2 nightly   -Interm history reviewed    -OARRS record was obtained and reviewed  for the last one year and no indicators of drug misuse  were found. Any other controlled substance prescriptions  seen on the record have been accounted for, I am aware of the patient receiving these medications. [facility] OARRS record will be rechecked as part of office protocol. Most recent labs were reviewed and are baseline, okay  -Start Tens Unit to the neck and back    Ms. Enzo Hernandez will be prescribed  the medications  listed below which are for treatment of her presenting  medical problems which for this visit include:   Diagnoses of Chronic pain syndrome, Fibromyalgia, Tendinitis of right rotator cuff, Intractable migraine with aura with status migrainosus, DDD (degenerative disc disease), lumbar, DDD (degenerative disc disease), cervical, Chronic painful diabetic neuropathy (HCC), Rotator cuff tendonitis, right, Primary insomnia, Moderate episode of recurrent major depressive disorder ([address] and Headache, chronic migraine without aura, intractable, with status were pertinent to this visit. Medications/orders associated with this visit:    Current Outpatient Medications   Medication Sig Dispense Refill    oxyCODONE-acetaminophen (PERCOCET) 7.5-325 MG per tablet Take 1 tablet by mouth every 6 hours as needed for Pain (max 3 per day) for up to 28 days.  84 tablet 0    DULoxetine (CYMBALTA) 60 MG extended release capsule Take 1 capsule by mouth daily 30 capsule 1    QUEtiapine (SEROQUEL) 25 MG tablet Take 1-2 tablets by mouth nightly 60 tablet 1    Erenumab-aooe (AIMOVIG) 70 MG/ML SOAJ Inject 140 ml into the skin every 30 days 1 mL 0    Ubrogepant (UBRELVY) 50 MG TABS Take 1 tablet po PRN at start of headaches, can repeat in 24 hours 10 tablet 1    metoprolol succinate (TOPROL XL) 50 MG extended release tablet Take 1 tablet by mouth nightly 90 tablet 5    insulin glargine (BASAGLAR KWIKPEN) 100 UNIT/ML injection pen Inject 8 Units into the skin 2 times daily 5 pen 3    amLODIPine (NORVASC) 5 MG tablet Take 2 tablets by mouth daily 90 tablet 5    isosorbide mononitrate (IMDUR) 60 MG extended release tablet Take 1 tablet by mouth daily 90 tablet 5    nitroGLYCERIN (NITRODUR) 0.1 MG/HR Place 1 patch onto the skin every 24 hours 30 patch 0    Continuous Blood Gluc  (FREESTYLE LISSETT 14 DAY READER) DAYO 1 each by Does not apply route 3 times daily 1 each 1    Continuous Blood Gluc Sensor (FREESTYLE LISSETT 14 DAY SENSOR) MISC 1 each by Does not apply route every 14 days 2 each 5    docusate sodium (COLACE) 100 MG capsule Take 100 mg by mouth 2 times daily      tiZANidine (ZANAFLEX) 4 MG tablet Take 0.5-1 tablets by mouth 2 times daily as needed (muscle spasms) 60 tablet 1    atorvastatin (LIPITOR) 80 MG tablet TAKE 1 TABLET BY MOUTH NIGHTLY 90 tablet 5    clopidogrel (PLAVIX) 75 MG tablet TAKE ONE TABLET BY MOUTH EVERY DAY 90 tablet 5    ranolazine (RANEXA) 1000 MG extended release tablet Take 1 tablet by mouth 2 times daily 60 tablet 8    linaclotide (LINZESS) 145 MCG capsule Take 1 capsule by mouth every morning (before breakfast) 30 capsule 5    ULTICARE MINI PEN NEEDLES 31G X 6 MM MISC USE WITH INSULINS FOUR TIMES A  each 0    alirocumab (PRALUENT) 75 MG/ML SOAJ injection pen Inject 1 mL into the skin every 14 days 6 pen 3    omeprazole (PRILOSEC) 20 MG delayed release capsule TAKE 1 CAPSULE BY MOUTH DAILY 30 capsule 1    aspirin 81 MG chewable tablet Take 1 tablet by mouth daily 30 tablet 3    nitroGLYCERIN (NITROSTAT) 0.4 MG SL tablet Place 1 tablet under the tongue every 5 minutes as needed for Chest pain up to max of 3 total doses. If no relief after 1 dose, call 911. 25 tablet 3    albuterol (PROVENTIL) (2.5 MG/3ML) 0.083% nebulizer solution Take 3 mLs by nebulization every 4 hours as needed for Wheezing 120 each 5    albuterol sulfate HFA (VENTOLIN HFA) 108 (90 Base) MCG/ACT inhaler Inhale 2 puffs into the lungs every 4 hours as needed for Wheezing or Shortness of Breath 3 Inhaler 5     No current facility-administered medications for this visit. Goals of current treatment regimen include improvement in pain, restoration of functioning- with focus on improvement in physical performance, general activity, work or disability,emotional distress, health care utilization and  decreased medication consumption. Will continue to monitor progress towards achieving/maintaining therapeutic goals with special emphasis on  1. Improvement in perceived interfernce  of pain with ADL's. Ability to do home exercises independently. Ability to do household chores indoor and/or outdoor work and social and leisure activities.  To increase flexibility/ROM, strength and endurance. Improve psychosocial and physical functioning.- she is not showing any significant progress/or showing regression  towards this goal and reassessment and adjustment of goals/treatment have been made. 2. Improving sleep to 6-7 hours a night. Improve mood/ anxiety and depression symptoms such as crying spells, low energy, problems with concentration, motivation. - she is not showing any significant progress/or showing regression  towards this goal and reassessment and adjustment of goals/treatment have been made. 3. Reduction of reliance on opioid analgesia/more appropriate opioid use. - she is showing progression towards this treatment goal with the current regimen. Risks and benefits of the medications and other alternative treatments have been/were  discussed with the patient. Any questions on the  common side effects of these medications were also answered. She was advised against drinking alcohol with the narcotic pain medicines, advised against driving or handling machinery when  starting or adjusting the dose of medicines, feeling groggy or drowsy, or if having any cognitive issues related to the current medications. Sheis fully aware of the risk of overdose and death, if medicines are misused and not taken as prescribed. If she develops new symptoms or if the symptoms worsen, she was told to call the office. .  Thank you for allowing me to participate in the care of this patient.     Angel Viera MD.    Cc: Angelnie Chacko MD

## 2022-02-08 NOTE — TELEPHONE ENCOUNTER
Per result note, please set up a abdominal pelvis venogram with Dr. Abdias Horner the day when Dr. Jamshid Avila is around. Left Maren a message to return call and will facilitate scheduling.

## 2022-02-09 ENCOUNTER — PATIENT MESSAGE (OUTPATIENT)
Dept: CARDIOLOGY CLINIC | Age: 67
End: 2022-02-09

## 2022-02-09 NOTE — TELEPHONE ENCOUNTER
Patient returned call and we reviewed CTA venous abd/pel. Will take a look at both Dr. Mahesh Tanner and Dr. Luis Harrison schedules for available date/times.      to Blanca Crook   2/9/22 4:19 PM  Riana Engel for procedure with Dr. Mahesh Tanner:    1) Wednesday 3/16/22 9 am, 730 arrival time   2) Wednesday 3/23/22 9 am , 7 30 arrival time    Let me know which date works for you?

## 2022-02-10 ENCOUNTER — PATIENT MESSAGE (OUTPATIENT)
Dept: CARDIOLOGY CLINIC | Age: 67
End: 2022-02-10

## 2022-02-10 NOTE — TELEPHONE ENCOUNTER
Abdominal and pelvis Venogram with Dr. Kay Monday (Dr. Beal Webster)   The morning of your procedure you will park in the hospital parking lot and report directly to the cath lab to check in.   Jo Adam [id number]      DATE: Wednesday 3/16/22  TIME: 8:30 a.m. ARRIVAL TIME: 7:00 a.m. Pre-Procedure Instructions:   1. You will need to fast for at least 8 hours prior to procedure. 2. No caffeine the morning of.   3. No gum or mints, if having general anesthesia. 4. Hold all diabetic medications the morning of procedure including, Metformin, glipizide. 5. If you take Lantus/Levemir/Basaglar (long acting insulin) only take ½ your normal dose the evening before and hold the morning of your procedure. 6. All other medications can be taken in the morning with sips of water-including taking your Plavix/clopidogrel and Aspirin. 7. Do not use any lotions, creams or perfume the morning of procedure. 8. Pre-procedure lab work will need to be completed 5-7 days prior to procedure. 9. Please have a responsible adult to drive you home after procedure. 10. We advise you have someone to stay with you for 24 hours following procedure for precautionary measures. 11. Depending on procedure you may require an overnight stay. 12. Cath lab will provide you with all post procedure instructions. If you have any questions regarding the procedure itself or medications, please call [phone] and ask to speak with a nurse. No hx of iodine allergy   Orders placed  insulin glargine BID-see above. Plavix and ASA -do not hold per Dr. Kay Monday. Published on [employer] and e-mail to [place] [place]. [employer] [facility]  Jo Adam   [address], [place] [address]  Phone: [phone]  The hours are Mon -Fri. 6:30 am - 4:00 pm   Saturday 8:00 am - noon  No appointment necessary  Per patient, mychart to patient. Will await review and any further questions.

## 2022-02-11 NOTE — TELEPHONE ENCOUNTER
Changing procedure to Wednesday 2/16/22 3pm, 130 arrival with Dr. Talya Eubanks, Dr. Nicky López available. Spoke with [employer] and may be able to move time up but will not know until Tuesday 2/15/22. Contacted patient, hector full. She has not read [employer] messages-she had instructed to communicate via [employer].

## 2022-02-11 NOTE — TELEPHONE ENCOUNTER
Reached out to patient and reviewed everything from the beginning and changes made. She is agreeable. Will go Monday for labs. She again ask that I send update to her MyChart. Answered q/c at this time. Asked her to contact the office with any needs. Changed published on [employer] and e-mail to Adalberto Martinez RN with update.

## 2022-02-14 DIAGNOSIS — I87.1 STENOSIS OF ILIAC VEIN: ICD-10-CM

## 2022-02-14 DIAGNOSIS — I82.220 INFERIOR VENA CAVA OCCLUSION (HCC): ICD-10-CM

## 2022-02-14 LAB
ANION GAP SERPL CALCULATED.3IONS-SCNC: 17 MMOL/L (ref 3–16)
BUN BLDV-MCNC: 34 MG/DL (ref 7–20)
CALCIUM SERPL-MCNC: 9.2 MG/DL (ref 8.3–10.6)
CHLORIDE BLD-SCNC: 103 MMOL/L (ref 99–110)
CO2: 18 MMOL/L (ref 21–32)
CREAT SERPL-MCNC: 1.2 MG/DL (ref 0.6–1.2)
GFR AFRICAN AMERICAN: 55
GFR NON-AFRICAN AMERICAN: 45
GLUCOSE BLD-MCNC: 126 MG/DL (ref 70–99)
HCT VFR BLD CALC: 32.4 % (ref 36–48)
HEMOGLOBIN: 10.4 G/DL (ref 12–16)
MCH RBC QN AUTO: 31.7 PG (ref 26–34)
MCHC RBC AUTO-ENTMCNC: 32.2 G/DL (ref 31–36)
MCV RBC AUTO: 98.5 FL (ref 80–100)
PDW BLD-RTO: 15.2 % (ref 12.4–15.4)
PLATELET # BLD: 193 K/UL (ref 135–450)
PMV BLD AUTO: 8.6 FL (ref 5–10.5)
POTASSIUM SERPL-SCNC: 4.4 MMOL/L (ref 3.5–5.1)
RBC # BLD: 3.29 M/UL (ref 4–5.2)
SODIUM BLD-SCNC: 138 MMOL/L (ref 136–145)
WBC # BLD: 4.3 K/UL (ref 4–11)

## 2022-02-15 ENCOUNTER — TELEPHONE (OUTPATIENT)
Dept: CARDIOLOGY CLINIC | Age: 67
End: 2022-02-15

## 2022-02-15 NOTE — TELEPHONE ENCOUNTER
Discussed with Andie  to update her. Verbally updated Dr. Meseret Higgins. Will move per Mariusz Mins. Instructions after 2/23/22 with insurance requirements. Dr. Yin Perez and Dr. Meseret Higgins have availability on Thursday 2/24/22 1130 start time, 1000 arrival time. Contacted patient and she is agreeable. She would like date/time sent via [employer].  Published on [place] and e-mail to [place] ([facility]).

## 2022-02-15 NOTE — TELEPHONE ENCOUNTER
Patient was scheduled on 2/15/22 for an Abdominal/Pelvic Venogram to be done on 2/16. This patient has a [employer] that requires authorization for this procedure and can take up to 14 days.  This will not be authorized in time for patient to have on 2/16/22

## 2022-02-24 ENCOUNTER — HOSPITAL ENCOUNTER (OUTPATIENT)
Dept: CARDIAC CATH/INVASIVE PROCEDURES | Age: 67
Discharge: HOME | End: 2022-02-24
Payer: COMMERCIAL

## 2022-02-24 ENCOUNTER — TELEPHONE (OUTPATIENT)
Dept: CARDIOLOGY CLINIC | Age: 67
End: 2022-02-24

## 2022-02-24 VITALS
HEIGHT: 60 IN | RESPIRATION RATE: 9 BRPM | SYSTOLIC BLOOD PRESSURE: 166 MMHG | BODY MASS INDEX: 21.4 KG/M2 | OXYGEN SATURATION: 100 % | HEART RATE: 72 BPM | TEMPERATURE: 97.2 F | WEIGHT: 109 LBS | DIASTOLIC BLOOD PRESSURE: 69 MMHG

## 2022-02-24 DIAGNOSIS — I87.1 STENOSIS OF INFERIOR VENA CAVA: Primary | ICD-10-CM

## 2022-02-24 PROCEDURE — 2709999900 HC NON-CHARGEABLE SUPPLY

## 2022-02-24 PROCEDURE — 75822 VEIN X-RAY ARMS/LEGS: CPT

## 2022-02-24 PROCEDURE — 36005 INJECTION EXT VENOGRAPHY: CPT | Performed by: INTERNAL MEDICINE

## 2022-02-24 PROCEDURE — 99153 MOD SED SAME PHYS/QHP EA: CPT

## 2022-02-24 PROCEDURE — 36005 INJECTION EXT VENOGRAPHY: CPT

## 2022-02-24 PROCEDURE — 99152 MOD SED SAME PHYS/QHP 5/>YRS: CPT

## 2022-02-24 PROCEDURE — 75825 VEIN X-RAY TRUNK: CPT | Performed by: INTERNAL MEDICINE

## 2022-02-24 PROCEDURE — 6360000002 HC RX W HCPCS

## 2022-02-24 PROCEDURE — C1894 INTRO/SHEATH, NON-LASER: HCPCS

## 2022-02-24 PROCEDURE — 2580000003 HC RX 258

## 2022-02-24 PROCEDURE — 6370000000 HC RX 637 (ALT 250 FOR IP)

## 2022-02-24 PROCEDURE — 6360000004 HC RX CONTRAST MEDICATION: Performed by: INTERNAL MEDICINE

## 2022-02-24 PROCEDURE — 75822 VEIN X-RAY ARMS/LEGS: CPT | Performed by: INTERNAL MEDICINE

## 2022-02-24 PROCEDURE — C1769 GUIDE WIRE: HCPCS

## 2022-02-24 RX ORDER — SODIUM CHLORIDE 0.9 % (FLUSH) 0.9 %
5-40 SYRINGE (ML) INJECTION PRN
Status: DISCONTINUED | OUTPATIENT
Start: 2022-02-24 | End: 2022-02-25 | Stop reason: HOSPADM

## 2022-02-24 RX ORDER — SODIUM CHLORIDE 9 MG/ML
INJECTION, SOLUTION INTRAVENOUS CONTINUOUS
Status: DISCONTINUED | OUTPATIENT
Start: 2022-02-24 | End: 2022-02-25 | Stop reason: HOSPADM

## 2022-02-24 RX ORDER — IODIXANOL 320 MG/ML
69 INJECTION, SOLUTION INTRAVASCULAR
Status: COMPLETED | OUTPATIENT
Start: 2022-02-24 | End: 2022-02-24

## 2022-02-24 RX ORDER — SODIUM CHLORIDE 0.9 % (FLUSH) 0.9 %
5-40 SYRINGE (ML) INJECTION EVERY 12 HOURS SCHEDULED
Status: DISCONTINUED | OUTPATIENT
Start: 2022-02-24 | End: 2022-02-25 | Stop reason: HOSPADM

## 2022-02-24 RX ORDER — ASPIRIN 325 MG
325 TABLET ORAL ONCE
Status: DISCONTINUED | OUTPATIENT
Start: 2022-02-24 | End: 2022-02-25 | Stop reason: HOSPADM

## 2022-02-24 RX ORDER — SODIUM CHLORIDE 9 MG/ML
25 INJECTION, SOLUTION INTRAVENOUS PRN
Status: DISCONTINUED | OUTPATIENT
Start: 2022-02-24 | End: 2022-02-25 | Stop reason: HOSPADM

## 2022-02-24 RX ORDER — ACETAMINOPHEN 325 MG/1
650 TABLET ORAL EVERY 4 HOURS PRN
Status: DISCONTINUED | OUTPATIENT
Start: 2022-02-24 | End: 2022-02-25 | Stop reason: HOSPADM

## 2022-02-24 RX ADMIN — IODIXANOL 69 ML: 320 INJECTION, SOLUTION INTRAVASCULAR at 08:37

## 2022-02-24 NOTE — TELEPHONE ENCOUNTER
Notified by Dr. Emil Villagomez, patient has venogram today and will need repeat angiogram to be scheduled with Dr. Emil Villagomez and Dr. Randy Carvalho. Will need 2 hr for the procedure and the [employer] rep [place] will also need to be notified of the scheduled procedure. Patient will be discharged today and will have procedure scheduled.

## 2022-02-27 ENCOUNTER — PATIENT MESSAGE (OUTPATIENT)
Dept: PRIMARY CARE CLINIC | Age: 67
End: 2022-02-27

## 2022-02-27 DIAGNOSIS — E11.59 TYPE 2 DIABETES MELLITUS WITH OTHER CIRCULATORY COMPLICATION, WITH LONG-TERM CURRENT USE OF INSULIN (HCC): Primary | ICD-10-CM

## 2022-02-27 DIAGNOSIS — Z79.4 TYPE 2 DIABETES MELLITUS WITH OTHER CIRCULATORY COMPLICATION, WITH LONG-TERM CURRENT USE OF INSULIN (HCC): Primary | ICD-10-CM

## 2022-02-28 NOTE — TELEPHONE ENCOUNTER
Called and spoke with Maren. Procedure scheduled for 4/6/22 at 1030, patient to arrive at 9:00. Reviewed pre procedure instructions. Patient v/u, cancelled follow up and will reschedule post procedure. The morning of your procedure you will park in the hospital parking lot and report directly to the cath lab to check in.     Pre-Procedure Instructions   1. You will need to fast for at least 8 hours prior to procedure. No caffeine the morning of. All other medications can be taken in the morning with sips of water. 2. You will need to take 325 mg aspirin the morning of. If you are currently taking 81 mg please take 4 tablets that morning. 3. Do not use any lotions, creams or perfume the morning of procedure. 4. Pre-procedure lab work will need to be completed 5-7 days prior to procedure. 5. Please have a responsible adult to drive you home after procedure. We advise you have someone to stay with you for 24 hours following procedure for precautionary measures. Depending on procedure you may require an overnight stay. 6. Cath lab will provide you with all post procedure instructions. If you have any questions regarding the procedure itself or medications, please call [phone] and ask to speak with a nurse. Published on [employer] and e-mail to Melani Angeles.

## 2022-02-28 NOTE — TELEPHONE ENCOUNTER
From: Krystina Gonzales  To: Dr. Ram Million: 2/27/2022 4:14 PM EST  Subject: Blood pressure    Dr Juan C Gomes can't get my blood pressure under control having really bad headache and yes sandra taking my meds

## 2022-03-01 RX ORDER — LANCING DEVICE
EACH MISCELLANEOUS
Qty: 1 EACH | Refills: 11 | OUTPATIENT
Start: 2022-03-01

## 2022-03-08 ENCOUNTER — OFFICE VISIT (OUTPATIENT)
Dept: PAIN MANAGEMENT | Age: 67
End: 2022-03-08
Payer: COMMERCIAL

## 2022-03-08 VITALS
HEART RATE: 73 BPM | DIASTOLIC BLOOD PRESSURE: 72 MMHG | RESPIRATION RATE: 16 BRPM | BODY MASS INDEX: 20.96 KG/M2 | SYSTOLIC BLOOD PRESSURE: 136 MMHG | OXYGEN SATURATION: 99 % | HEIGHT: 61 IN | WEIGHT: 111 LBS

## 2022-03-08 DIAGNOSIS — M50.30 DDD (DEGENERATIVE DISC DISEASE), CERVICAL: ICD-10-CM

## 2022-03-08 DIAGNOSIS — M79.7 FIBROMYALGIA: ICD-10-CM

## 2022-03-08 DIAGNOSIS — M75.81 ROTATOR CUFF TENDONITIS, RIGHT: ICD-10-CM

## 2022-03-08 DIAGNOSIS — E11.40 CHRONIC PAINFUL DIABETIC NEUROPATHY (HCC): ICD-10-CM

## 2022-03-08 DIAGNOSIS — M75.81 TENDINITIS OF RIGHT ROTATOR CUFF: ICD-10-CM

## 2022-03-08 DIAGNOSIS — G43.711 HEADACHE, CHRONIC MIGRAINE WITHOUT AURA, INTRACTABLE, WITH STATUS: ICD-10-CM

## 2022-03-08 DIAGNOSIS — G89.4 CHRONIC PAIN SYNDROME: ICD-10-CM

## 2022-03-08 DIAGNOSIS — M51.36 DDD (DEGENERATIVE DISC DISEASE), LUMBAR: ICD-10-CM

## 2022-03-08 PROCEDURE — G8482 FLU IMMUNIZE ORDER/ADMIN: HCPCS | Performed by: INTERNAL MEDICINE

## 2022-03-08 PROCEDURE — 3017F COLORECTAL CA SCREEN DOC REV: CPT | Performed by: INTERNAL MEDICINE

## 2022-03-08 PROCEDURE — 1090F PRES/ABSN URINE INCON ASSESS: CPT | Performed by: INTERNAL MEDICINE

## 2022-03-08 PROCEDURE — 99213 OFFICE O/P EST LOW 20 MIN: CPT | Performed by: INTERNAL MEDICINE

## 2022-03-08 PROCEDURE — 1123F ACP DISCUSS/DSCN MKR DOCD: CPT | Performed by: INTERNAL MEDICINE

## 2022-03-08 PROCEDURE — 2022F DILAT RTA XM EVC RTNOPTHY: CPT | Performed by: INTERNAL MEDICINE

## 2022-03-08 PROCEDURE — 3051F HG A1C>EQUAL 7.0%<8.0%: CPT | Performed by: INTERNAL MEDICINE

## 2022-03-08 PROCEDURE — G8420 CALC BMI NORM PARAMETERS: HCPCS | Performed by: INTERNAL MEDICINE

## 2022-03-08 PROCEDURE — G8427 DOCREV CUR MEDS BY ELIG CLIN: HCPCS | Performed by: INTERNAL MEDICINE

## 2022-03-08 PROCEDURE — G8400 PT W/DXA NO RESULTS DOC: HCPCS | Performed by: INTERNAL MEDICINE

## 2022-03-08 PROCEDURE — 1036F TOBACCO NON-USER: CPT | Performed by: INTERNAL MEDICINE

## 2022-03-08 PROCEDURE — 4040F PNEUMOC VAC/ADMIN/RCVD: CPT | Performed by: INTERNAL MEDICINE

## 2022-03-08 RX ORDER — UBROGEPANT 50 MG/1
TABLET ORAL
Qty: 10 TABLET | Refills: 1 | Status: SHIPPED | OUTPATIENT
Start: 2022-03-08 | End: 2022-04-05 | Stop reason: SDUPTHER

## 2022-03-08 RX ORDER — OXYCODONE AND ACETAMINOPHEN 7.5; 325 MG/1; MG/1
1 TABLET ORAL EVERY 6 HOURS PRN
Qty: 100 TABLET | Refills: 0 | Status: SHIPPED | OUTPATIENT
Start: 2022-03-08 | End: 2022-04-05 | Stop reason: SDUPTHER

## 2022-03-08 RX ORDER — QUETIAPINE FUMARATE 25 MG/1
25-50 TABLET, FILM COATED ORAL NIGHTLY
Qty: 60 TABLET | Refills: 1 | Status: SHIPPED | OUTPATIENT
Start: 2022-03-08 | End: 2022-04-05 | Stop reason: SDUPTHER

## 2022-03-08 RX ORDER — DULOXETIN HYDROCHLORIDE 60 MG/1
60 CAPSULE, DELAYED RELEASE ORAL DAILY
Qty: 30 CAPSULE | Refills: 1 | Status: SHIPPED | OUTPATIENT
Start: 2022-03-08 | End: 2022-04-05 | Stop reason: SDUPTHER

## 2022-03-08 RX ORDER — ERENUMAB-AOOE 70 MG/ML
INJECTION SUBCUTANEOUS
Qty: 1 ML | Refills: 0 | Status: SHIPPED | OUTPATIENT
Start: 2022-03-08 | End: 2022-04-05 | Stop reason: SDUPTHER

## 2022-03-08 RX ORDER — TIZANIDINE 4 MG/1
2-4 TABLET ORAL 2 TIMES DAILY PRN
Qty: 60 TABLET | Refills: 1 | Status: SHIPPED | OUTPATIENT
Start: 2022-03-08 | End: 2022-04-05 | Stop reason: SDUPTHER

## 2022-03-08 RX ORDER — AMITRIPTYLINE HYDROCHLORIDE 10 MG/1
TABLET, FILM COATED ORAL
COMMUNITY
Start: 2022-03-03 | End: 2022-04-05

## 2022-03-08 NOTE — PROGRESS NOTES
Vera   1955  [id number]    HISTORY OF PRESENT ILLNESS:  Ms. Mikayla Woods is a 73 y.o. female returns for a follow up visit for multiple medical problems. Her current presenting problems are   1. Chronic pain syndrome    2. Intractable migraine with aura with status migrainosus    3. Chronic painful diabetic neuropathy ([facility]    4. Moderate episode of recurrent major depressive disorder ([facility] [address]    5. Fibromyalgia    6. DDD (degenerative disc disease), lumbar    7. Rotator cuff tendonitis, right    8. Headache, chronic migraine without aura, intractable, with status    9. Tendinitis of right rotator cuff    10. DDD (degenerative disc disease), cervical    11. Primary insomnia    . As per information/history obtained from the PADT(patient assessment and documentation tool) - She complains of pain in the head, neck, lower back, upper leg Right and lower leg Right with radiation to the upper leg Right and lower leg Right She rates the pain 10/10 and describes it as sharp, aching. Pain is made worse by: nothing, movement, walking, standing, sitting, bending, lifting. Current treatment regimen has helped relieve about 0% of the pain. She denies side effects from the current pain regimen. Patient reports that since the last follow up visit the physical functioning is worse, family/social relationships are worse, mood is worse and sleep patterns are worse, and that the overall functioning is worse. Patient denies neurological bowel or bladder. Patient denies misusing/abusing her narcotic pain medications or using any illegal drugs. There are No indicators for possible drug abuse, addiction or diversion problems. Upon obtaining the medical history from Ms. Meza regarding today's office visit for her presenting problems, patient states she is having increase pain in the neck and upper back. Ms. Mikayla Woods is complaining of her back of the head hurting the most. She says her body locks up and gets tight. Patient states she is using Percocet 3 per day still. Patient denies any constipation symptoms. She says her headache medications are helping. ALLERGIES: Patients list of allergies were reviewed     MEDICATIONS: Ms. Bolivar Chi list of medications were reviewed. Her current medications are   Outpatient Medications Prior to Visit   Medication Sig Dispense Refill    amitriptyline (ELAVIL) 10 MG tablet       oxyCODONE-acetaminophen (PERCOCET) 7.5-325 MG per tablet Take 1 tablet by mouth every 6 hours as needed for Pain (max 3 per day) for up to 28 days.  84 tablet 0    DULoxetine (CYMBALTA) 60 MG extended release capsule Take 1 capsule by mouth daily 30 capsule 1    QUEtiapine (SEROQUEL) 25 MG tablet Take 1-2 tablets by mouth nightly 60 tablet 1    Erenumab-aooe (AIMOVIG) 70 MG/ML SOAJ Inject 140 ml into the skin every 30 days 1 mL 0    Ubrogepant (UBRELVY) 50 MG TABS Take 1 tablet po PRN at start of headaches, can repeat in 24 hours 10 tablet 1    metoprolol succinate (TOPROL XL) 50 MG extended release tablet Take 1 tablet by mouth nightly 90 tablet 5    insulin glargine (BASAGLAR KWIKPEN) 100 UNIT/ML injection pen Inject 8 Units into the skin 2 times daily 5 pen 3    amLODIPine (NORVASC) 5 MG tablet Take 2 tablets by mouth daily 90 tablet 5    isosorbide mononitrate (IMDUR) 60 MG extended release tablet Take 1 tablet by mouth daily 90 tablet 5    nitroGLYCERIN (NITRODUR) 0.1 MG/HR Place 1 patch onto the skin every 24 hours 30 patch 0    Continuous Blood Gluc  (FREESTYLE LISSETT 14 DAY READER) DAYO 1 each by Does not apply route 3 times daily 1 each 1    Continuous Blood Gluc Sensor (FREESTYLE LISSETT 14 DAY SENSOR) MISC 1 each by Does not apply route every 14 days 2 each 5    docusate sodium (COLACE) 100 MG capsule Take 100 mg by mouth 2 times daily      tiZANidine (ZANAFLEX) 4 MG tablet Take 0.5-1 tablets by mouth 2 times daily as needed (muscle spasms) 60 tablet 1    atorvastatin (LIPITOR) 80 MG tablet TAKE 1 TABLET BY MOUTH NIGHTLY 90 tablet 5    clopidogrel (PLAVIX) 75 MG tablet TAKE ONE TABLET BY MOUTH EVERY DAY 90 tablet 5    ranolazine (RANEXA) 1000 MG extended release tablet Take 1 tablet by mouth 2 times daily 60 tablet 8    linaclotide (LINZESS) 145 MCG capsule Take 1 capsule by mouth every morning (before breakfast) 30 capsule 5    ULTICARE MINI PEN NEEDLES 31G X 6 MM MISC USE WITH INSULINS FOUR TIMES A  each 0    alirocumab (PRALUENT) 75 MG/ML SOAJ injection pen Inject 1 mL into the skin every 14 days 6 pen 3    omeprazole (PRILOSEC) 20 MG delayed release capsule TAKE 1 CAPSULE BY MOUTH DAILY 30 capsule 1    aspirin 81 MG chewable tablet Take 1 tablet by mouth daily 30 tablet 3    nitroGLYCERIN (NITROSTAT) 0.4 MG SL tablet Place 1 tablet under the tongue every 5 minutes as needed for Chest pain up to max of 3 total doses. If no relief after 1 dose, call 911. 25 tablet 3    albuterol (PROVENTIL) (2.5 MG/3ML) 0.083% nebulizer solution Take 3 mLs by nebulization every 4 hours as needed for Wheezing 120 each 5    albuterol sulfate HFA (VENTOLIN HFA) 108 (90 Base) MCG/ACT inhaler Inhale 2 puffs into the lungs every 4 hours as needed for Wheezing or Shortness of Breath 3 Inhaler 5     No facility-administered medications prior to visit. REVIEW OF SYSTEMS:    Respiratory: Negative for apnea, chest tightness and shortness of breath or change in baseline breathing. PHYSICAL EXAM:   Nursing note and vitals reviewed. /72   Pulse 73   Resp 16   Ht 5' 1\" (1.549 m)   Wt 111 lb (50.3 kg)   SpO2 99%   BMI 20.97 kg/m²   Constitutional: She appears well-developed and well-nourished. No acute distress. Cardiovascular: Normal rate, regular rhythm, normal heart sounds, and does not have murmur. Pulmonary/Chest: Effort normal. No respiratory distress. She does not have wheezes in the lung fields. She has no rales.      Neurological/Psychiatric:She is alert and oriented to person, place, and time. Coordination is  normal.  Her mood isAppropriate and affect is Neutral/Euthymic(normal) . IMPRESSION:   1. Chronic pain syndrome    2. Chronic painful diabetic neuropathy ([address]    3. Fibromyalgia    4. DDD (degenerative disc disease), lumbar    5. Rotator cuff tendonitis, right    6. Headache, chronic migraine without aura, intractable, with status    7. Tendinitis of right rotator cuff    8. DDD (degenerative disc disease), cervical        PLAN:  Informed verbal consent was obtained  -ROM/Stretching exercises as advised   -Increase Percocet to 3-4 per day  -She was advised to increase fluids ( 5-7  glasses of fluid daily), limit caffeine, avoid cheese products, increase dietary fiber, increase activity and exercise as tolerated and relax regularly and enjoy meals   -Continue with all other adjuvant medications as before   -she was advised  to avoid using too many OTC analgesics to control the headaches, avoid chocolates, increased caffeine, cheeses and MSG nitrite containing foods, cigarette smoking. To avoid bright lights, strong smells and skipping meals. Current Outpatient Medications   Medication Sig Dispense Refill    amitriptyline (ELAVIL) 10 MG tablet       oxyCODONE-acetaminophen (PERCOCET) 7.5-325 MG per tablet Take 1 tablet by mouth every 6 hours as needed for Pain (max 3 per day) for up to 28 days.  84 tablet 0    DULoxetine (CYMBALTA) 60 MG extended release capsule Take 1 capsule by mouth daily 30 capsule 1    QUEtiapine (SEROQUEL) 25 MG tablet Take 1-2 tablets by mouth nightly 60 tablet 1    Erenumab-aooe (AIMOVIG) 70 MG/ML SOAJ Inject 140 ml into the skin every 30 days 1 mL 0    Ubrogepant (UBRELVY) 50 MG TABS Take 1 tablet po PRN at start of headaches, can repeat in 24 hours 10 tablet 1    metoprolol succinate (TOPROL XL) 50 MG extended release tablet Take 1 tablet by mouth nightly 90 tablet 5    insulin glargine (BASAGLAR KWIKPEN) 100 UNIT/ML injection pen Inject 8 Units into the skin 2 times daily 5 pen 3    amLODIPine (NORVASC) 5 MG tablet Take 2 tablets by mouth daily 90 tablet 5    isosorbide mononitrate (IMDUR) 60 MG extended release tablet Take 1 tablet by mouth daily 90 tablet 5    nitroGLYCERIN (NITRODUR) 0.1 MG/HR Place 1 patch onto the skin every 24 hours 30 patch 0    Continuous Blood Gluc  (FREESTYLE LISSETT 14 DAY READER) DAYO 1 each by Does not apply route 3 times daily 1 each 1    Continuous Blood Gluc Sensor (FREESTYLE LISSETT 14 DAY SENSOR) MISC 1 each by Does not apply route every 14 days 2 each 5    docusate sodium (COLACE) 100 MG capsule Take 100 mg by mouth 2 times daily      tiZANidine (ZANAFLEX) 4 MG tablet Take 0.5-1 tablets by mouth 2 times daily as needed (muscle spasms) 60 tablet 1    atorvastatin (LIPITOR) 80 MG tablet TAKE 1 TABLET BY MOUTH NIGHTLY 90 tablet 5    clopidogrel (PLAVIX) 75 MG tablet TAKE ONE TABLET BY MOUTH EVERY DAY 90 tablet 5    ranolazine (RANEXA) 1000 MG extended release tablet Take 1 tablet by mouth 2 times daily 60 tablet 8    linaclotide (LINZESS) 145 MCG capsule Take 1 capsule by mouth every morning (before breakfast) 30 capsule 5    ULTICARE MINI PEN NEEDLES 31G X 6 MM MISC USE WITH INSULINS FOUR TIMES A  each 0    alirocumab (PRALUENT) 75 MG/ML SOAJ injection pen Inject 1 mL into the skin every 14 days 6 pen 3    omeprazole (PRILOSEC) 20 MG delayed release capsule TAKE 1 CAPSULE BY MOUTH DAILY 30 capsule 1    aspirin 81 MG chewable tablet Take 1 tablet by mouth daily 30 tablet 3    nitroGLYCERIN (NITROSTAT) 0.4 MG SL tablet Place 1 tablet under the tongue every 5 minutes as needed for Chest pain up to max of 3 total doses.  If no relief after 1 dose, call 911. 25 tablet 3    albuterol (PROVENTIL) (2.5 MG/3ML) 0.083% nebulizer solution Take 3 mLs by nebulization every 4 hours as needed for Wheezing 120 each 5    albuterol sulfate HFA (VENTOLIN HFA) 108 (90 Base) MCG/ACT inhaler Inhale 2 puffs into the lungs every 4 hours as needed for Wheezing or Shortness of Breath 3 Inhaler 5     No current facility-administered medications for this visit. I will continue her current medication regimen  which is part of the above treatment schedule. It has been helping with Ms. Meza's chronic  medical problems which for this visit include:   Diagnoses of Chronic pain syndrome, Intractable migraine with aura with status migrainosus, Chronic painful diabetic neuropathy (HCC), Moderate episode of recurrent major depressive disorder ([address] Fibromyalgia, DDD (degenerative disc disease), lumbar, Rotator cuff tendonitis, right, Headache, chronic migraine without aura, intractable, with status, Tendinitis of right rotator cuff, DDD (degenerative disc disease), cervical, and Primary insomnia were pertinent to this visit. Risks and benefits of the medications and other alternative treatments  including no treatment were discussed with the patient. The common side effects of these medications were also explained to the patient. Informed verbal consent was obtained. Goals of current treatment regimen include improvement in pain, restoration of functioning- with focus on improvement in physical performance, general activity, work or disability,emotional distress, health care utilization and  decreased opioid medication consumption- titrating to the lowest effective dose. Will continue to monitor progress towards achieving/maintaining therapeutic goals with special emphasis on  1. Improvement in perceived interfernce  of pain with ADL's. Ability to do home exercises independently. Ability to do household chores indoor and/or outdoor work and social and leisure activities. Improve psychosocial and physical functioning. - she is showing progression towards this treatment goal with the current regimen.      She was advised against drinking alcohol with the narcotic pain medicines, advised against driving or handling machinery while adjusting the dose of medicines or if having cognitive  issues related to the current medications. Risk of overdose and death, if medicines not taken as prescribed, were also discussed. If the patient develops new symptoms or if the symptoms worsen, the patient should call the office. While transcribing every attempt was made to maintain the accuracy of the note in terms of it's contents,there may have been some errors made inadvertently. Thank you for allowing me to participate in the care of this patient.     Nicole Gallegos MD.    Cc: Sabina Wilson MD

## 2022-03-08 NOTE — OP NOTE
[address]   Procedure Note    Socorro Schultz  YOB: 1955  [id number]    Pre-procedure Diagnosis:   Bilateral lower extremity edema    Post-procedure Diagnosis: Same    Procedure: 1) Percutaneous access to the Bilateral   common femoral vein  2)  Bilateral femoral venogram         Anesthesia: 1% Lidocaine solution with sedation    Surgeons/Assistants: Edgardo Delgado MD, MD WHITNEY    Estimated Blood Loss: Minimal    Complications: none    Specimens: none    Indications and consent: This is a 73y.o. year old female with Bilateral lower extremity edema. Treatment options were discussed. Angiography was recommended to evaluate and possibly intervene. Risks, benefits, and alternatives were discussed prior to the procedure. Questions from the patient and family were answered and appropriate signed informed consent was obtained. Procedure: The patient was brought to the angiography suite and placed in a supine position. After appropriate identification of the patient and time out the patient was positioned, prepped, and draped in the usual sterile fashion. Local anesthetic was applied over the Bilateral common femoral veins  The veins was accessed with a micro-punture needle kit and Seldinger technique was used to place a 5 Fr sheath. 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 Thomas Hammer     The patient tolerated the procedure well and was taken to recovery in stable condition.          Beth Cuadra  [facility], Interventional Cardiology, and Peripheral Vascular Disease   [facility] [address]   (C): [phone]  [facility] [phone]

## 2022-03-09 ENCOUNTER — TELEPHONE (OUTPATIENT)
Dept: PAIN MANAGEMENT | Age: 67
End: 2022-03-09

## 2022-03-09 NOTE — H&P
H&P Update - see note from 22    I have reviewed the history and physical and examined the patient and find no relevant changes. I have reviewed with the patient and/or family the risks, benefits, and alternatives to the procedure. Pre-sedation Assessment    Patient:  Henry Del Cid   :   1955  Intended Procedure: venogram     Vitals:    22 0715   BP: (!) 166/69   Pulse: 72   Resp: 9   Temp: 97.2 °F (36.2 °C)   SpO2: 100%       Nursing notes reviewed and agreed.   Medications reviewed  Allergies: No Known Allergies      Pre-Procedure Assessment/Plan:  ASA 2 - Patient with mild systemic disease with no functional limitations    Mallampati Airway Assessment:  Mallampati Class I - (soft palate, fauces, uvula & anterior/posterior tonsillar pillars are visible)    Level of Sedation Plan:Mild sedation    Post Procedure plan: Return to same level of care    Chelsy Montelongo MD [address] [facility], Interventional Cardiology, and Peripheral Vascular [address] [facility]   (C): [phone]  (O): [phone]

## 2022-03-14 ENCOUNTER — PATIENT MESSAGE (OUTPATIENT)
Dept: PRIMARY CARE CLINIC | Age: 67
End: 2022-03-14

## 2022-03-14 DIAGNOSIS — E11.59 TYPE 2 DIABETES MELLITUS WITH OTHER CIRCULATORY COMPLICATION, WITH LONG-TERM CURRENT USE OF INSULIN (HCC): ICD-10-CM

## 2022-03-14 DIAGNOSIS — Z79.4 TYPE 2 DIABETES MELLITUS WITH OTHER CIRCULATORY COMPLICATION, WITH LONG-TERM CURRENT USE OF INSULIN (HCC): ICD-10-CM

## 2022-03-14 DIAGNOSIS — J44.0 COPD WITH ACUTE BRONCHITIS (HCC): ICD-10-CM

## 2022-03-14 DIAGNOSIS — J20.9 COPD WITH ACUTE BRONCHITIS (HCC): ICD-10-CM

## 2022-03-14 RX ORDER — ALBUTEROL SULFATE 2.5 MG/3ML
2.5 SOLUTION RESPIRATORY (INHALATION) EVERY 4 HOURS PRN
Qty: 360 ML | Refills: 5 | Status: SHIPPED | OUTPATIENT
Start: 2022-03-14

## 2022-03-14 RX ORDER — PEN NEEDLE, DIABETIC 29 G X1/2"
NEEDLE, DISPOSABLE MISCELLANEOUS
Qty: 90 EACH | Refills: 1 | Status: SHIPPED | OUTPATIENT
Start: 2022-03-14

## 2022-03-14 NOTE — TELEPHONE ENCOUNTER
Medication:   Requested Prescriptions     Pending Prescriptions Disp Refills    albuterol (PROVENTIL) (2.5 MG/3ML) 0.083% nebulizer solution [Pharmacy Med Name: ALBUTEROL SUL 2.5 MG/3 ML S (2.5 MG/3ML Nebulization Solution] 360 mL 5     Sig: TAKE 3 MLS BY NEBULIZATION EVERY 4 HOURS AS NEEDED FOR WHEEZING    ULTICARE MINI PEN NEEDLES 31G X 6 MM MISC [Pharmacy Med Name: Maday Aldana PEN NDL 6 MM 31 G 31G X 6 MM Miscellaneous]  8     Sig: USE WITH INSULINS FOUR TIMES A DAY        Last Filled:      Patient Phone Number: [phone] (home)     Last appt: 1/27/2022   Next appt: Visit date not found    Last OARRS:   RX Monitoring 4/9/2019   Attestation The Prescription Monitoring Report for this patient was reviewed today.

## 2022-03-15 RX ORDER — BLOOD-GLUCOSE,RECEIVER,CONT
1 EACH MISCELLANEOUS DAILY
Qty: 1 EACH | Refills: 1 | Status: SHIPPED | OUTPATIENT
Start: 2022-03-15

## 2022-03-15 RX ORDER — BLOOD-GLUCOSE TRANSMITTER
1 EACH MISCELLANEOUS DAILY
Qty: 1 EACH | Refills: 2 | Status: SHIPPED | OUTPATIENT
Start: 2022-03-15

## 2022-03-15 RX ORDER — BLOOD-GLUCOSE SENSOR
1 EACH MISCELLANEOUS
Qty: 3 EACH | Refills: 1 | Status: SHIPPED | OUTPATIENT
Start: 2022-03-15 | End: 2022-06-03 | Stop reason: SDUPTHER

## 2022-03-15 NOTE — TELEPHONE ENCOUNTER
From: Thea Rosen  To: Dr. Kelsie Holloway: 3/14/2022 3:29 PM EDT  Subject: Diabetes    Just spoken to a Karla Flores at the pharmacy and said that I qualify for a freestyle monitor all he needs is a prescription from Dr Moi Fields

## 2022-03-16 DIAGNOSIS — J32.9 SINUSITIS, UNSPECIFIED CHRONICITY, UNSPECIFIED LOCATION: Primary | ICD-10-CM

## 2022-03-16 RX ORDER — AZITHROMYCIN 250 MG/1
250 TABLET, FILM COATED ORAL SEE ADMIN INSTRUCTIONS
Qty: 6 TABLET | Refills: 0 | Status: SHIPPED | OUTPATIENT
Start: 2022-03-16 | End: 2022-03-21

## 2022-03-17 ENCOUNTER — TELEPHONE (OUTPATIENT)
Dept: ADMINISTRATIVE | Age: 67
End: 2022-03-17

## 2022-03-17 NOTE — TELEPHONE ENCOUNTER
Submitted PA for Dexcom G6 Transmitter    Via [facility] Key: [id number] STATUS: DENIED. Please see Denial letter attached.     \"Dexcom G6 Transmitter is one of the drugs not a covered PART D by law\"

## 2022-03-22 ENCOUNTER — TELEPHONE (OUTPATIENT)
Dept: PRIMARY CARE CLINIC | Age: 67
End: 2022-03-22

## 2022-03-22 NOTE — TELEPHONE ENCOUNTER
[facility] called in regarding a Freestyle Alem 2 for PT. They state that they faxed a request for it to the office on 3/16 and were following up to see if the request has been addressed. Exp that Dr. Torey Medina was out of the office last week and only returned yesterday so the request likely has not been addressed just yet. [facility] said that they will fax another request but if the original message has not been already addressed that they need an rx and diagnosis in order for them to send the [facility] 2 to PT. Best call back number: [phone].     Fax #: [phone]

## 2022-03-25 ENCOUNTER — TELEPHONE (OUTPATIENT)
Dept: PAIN MANAGEMENT | Age: 67
End: 2022-03-25

## 2022-03-25 NOTE — TELEPHONE ENCOUNTER
Submitted PA for AIMOVIG  [facility] Key: [id number] STATUS:  APPROVED. The patient was notified by phone.

## 2022-03-31 RX ORDER — FLASH GLUCOSE SENSOR
1 KIT MISCELLANEOUS
Qty: 2 EACH | Refills: 5 | Status: SHIPPED | OUTPATIENT
Start: 2022-03-31 | End: 2022-08-25 | Stop reason: SDUPTHER

## 2022-03-31 RX ORDER — FLASH GLUCOSE SCANNING READER
1 EACH MISCELLANEOUS 3 TIMES DAILY
Qty: 1 EACH | Refills: 1 | Status: SHIPPED | OUTPATIENT
Start: 2022-03-31 | End: 2022-08-25 | Stop reason: SDUPTHER

## 2022-04-01 DIAGNOSIS — I87.1 STENOSIS OF INFERIOR VENA CAVA: ICD-10-CM

## 2022-04-01 LAB
ANION GAP SERPL CALCULATED.3IONS-SCNC: 18 MMOL/L (ref 3–16)
BUN BLDV-MCNC: 41 MG/DL (ref 7–20)
CALCIUM SERPL-MCNC: 9.6 MG/DL (ref 8.3–10.6)
CHLORIDE BLD-SCNC: 104 MMOL/L (ref 99–110)
CO2: 17 MMOL/L (ref 21–32)
CREAT SERPL-MCNC: 1.3 MG/DL (ref 0.6–1.2)
GFR AFRICAN AMERICAN: 50
GFR NON-AFRICAN AMERICAN: 41
GLUCOSE BLD-MCNC: 143 MG/DL (ref 70–99)
HCT VFR BLD CALC: 32.3 % (ref 36–48)
HEMOGLOBIN: 10.8 G/DL (ref 12–16)
MCH RBC QN AUTO: 32.2 PG (ref 26–34)
MCHC RBC AUTO-ENTMCNC: 33.3 G/DL (ref 31–36)
MCV RBC AUTO: 96.8 FL (ref 80–100)
PDW BLD-RTO: 14.4 % (ref 12.4–15.4)
PLATELET # BLD: 187 K/UL (ref 135–450)
PMV BLD AUTO: 8.5 FL (ref 5–10.5)
POTASSIUM SERPL-SCNC: 5.2 MMOL/L (ref 3.5–5.1)
RBC # BLD: 3.34 M/UL (ref 4–5.2)
SODIUM BLD-SCNC: 139 MMOL/L (ref 136–145)
WBC # BLD: 4.4 K/UL (ref 4–11)

## 2022-04-05 ENCOUNTER — OFFICE VISIT (OUTPATIENT)
Dept: PAIN MANAGEMENT | Age: 67
End: 2022-04-05
Payer: COMMERCIAL

## 2022-04-05 VITALS
BODY MASS INDEX: 22.66 KG/M2 | HEART RATE: 76 BPM | DIASTOLIC BLOOD PRESSURE: 74 MMHG | HEIGHT: 61 IN | WEIGHT: 120 LBS | SYSTOLIC BLOOD PRESSURE: 170 MMHG

## 2022-04-05 DIAGNOSIS — M51.36 DDD (DEGENERATIVE DISC DISEASE), LUMBAR: ICD-10-CM

## 2022-04-05 DIAGNOSIS — E11.40 CHRONIC PAINFUL DIABETIC NEUROPATHY (HCC): ICD-10-CM

## 2022-04-05 DIAGNOSIS — G89.4 CHRONIC PAIN SYNDROME: ICD-10-CM

## 2022-04-05 DIAGNOSIS — M50.30 DDD (DEGENERATIVE DISC DISEASE), CERVICAL: ICD-10-CM

## 2022-04-05 DIAGNOSIS — M75.81 TENDINITIS OF RIGHT ROTATOR CUFF: ICD-10-CM

## 2022-04-05 DIAGNOSIS — M79.7 FIBROMYALGIA: ICD-10-CM

## 2022-04-05 DIAGNOSIS — M75.81 ROTATOR CUFF TENDONITIS, RIGHT: ICD-10-CM

## 2022-04-05 PROCEDURE — G8427 DOCREV CUR MEDS BY ELIG CLIN: HCPCS | Performed by: INTERNAL MEDICINE

## 2022-04-05 PROCEDURE — 1090F PRES/ABSN URINE INCON ASSESS: CPT | Performed by: INTERNAL MEDICINE

## 2022-04-05 PROCEDURE — 1036F TOBACCO NON-USER: CPT | Performed by: INTERNAL MEDICINE

## 2022-04-05 PROCEDURE — 1123F ACP DISCUSS/DSCN MKR DOCD: CPT | Performed by: INTERNAL MEDICINE

## 2022-04-05 PROCEDURE — 4040F PNEUMOC VAC/ADMIN/RCVD: CPT | Performed by: INTERNAL MEDICINE

## 2022-04-05 PROCEDURE — G8420 CALC BMI NORM PARAMETERS: HCPCS | Performed by: INTERNAL MEDICINE

## 2022-04-05 PROCEDURE — 3051F HG A1C>EQUAL 7.0%<8.0%: CPT | Performed by: INTERNAL MEDICINE

## 2022-04-05 PROCEDURE — 99213 OFFICE O/P EST LOW 20 MIN: CPT | Performed by: INTERNAL MEDICINE

## 2022-04-05 PROCEDURE — 3017F COLORECTAL CA SCREEN DOC REV: CPT | Performed by: INTERNAL MEDICINE

## 2022-04-05 PROCEDURE — G8400 PT W/DXA NO RESULTS DOC: HCPCS | Performed by: INTERNAL MEDICINE

## 2022-04-05 PROCEDURE — 2022F DILAT RTA XM EVC RTNOPTHY: CPT | Performed by: INTERNAL MEDICINE

## 2022-04-05 RX ORDER — QUETIAPINE FUMARATE 25 MG/1
25-50 TABLET, FILM COATED ORAL NIGHTLY
Qty: 60 TABLET | Refills: 1 | Status: SHIPPED | OUTPATIENT
Start: 2022-04-05 | End: 2022-05-03 | Stop reason: SDUPTHER

## 2022-04-05 RX ORDER — DULOXETIN HYDROCHLORIDE 60 MG/1
60 CAPSULE, DELAYED RELEASE ORAL DAILY
Qty: 30 CAPSULE | Refills: 1 | Status: SHIPPED | OUTPATIENT
Start: 2022-04-05 | End: 2022-05-03 | Stop reason: SDUPTHER

## 2022-04-05 RX ORDER — UBROGEPANT 50 MG/1
TABLET ORAL
Qty: 16 TABLET | Refills: 1 | Status: SHIPPED | OUTPATIENT
Start: 2022-04-05 | End: 2022-05-03 | Stop reason: SDUPTHER

## 2022-04-05 RX ORDER — OXYCODONE AND ACETAMINOPHEN 7.5; 325 MG/1; MG/1
1 TABLET ORAL EVERY 6 HOURS PRN
Qty: 100 TABLET | Refills: 0 | Status: SHIPPED | OUTPATIENT
Start: 2022-04-05 | End: 2022-05-03 | Stop reason: SDUPTHER

## 2022-04-05 RX ORDER — TIZANIDINE 4 MG/1
2-4 TABLET ORAL 2 TIMES DAILY PRN
Qty: 60 TABLET | Refills: 1 | Status: SHIPPED | OUTPATIENT
Start: 2022-04-05 | End: 2022-05-03 | Stop reason: SDUPTHER

## 2022-04-05 RX ORDER — ERENUMAB-AOOE 70 MG/ML
INJECTION SUBCUTANEOUS
Qty: 1 ML | Refills: 0 | Status: SHIPPED | OUTPATIENT
Start: 2022-04-05 | End: 2022-04-21

## 2022-04-05 NOTE — PROGRESS NOTES
Nguyen Dupree  1955  [id number]      HISTORY OF PRESENT ILLNESS:  Ms. Klaudia Loyola is a 73 y.o. female returns for a follow up visit for pain management  She has a diagnosis of   1. Chronic pain syndrome    2. DDD (degenerative disc disease), lumbar    3. Tendinitis of right rotator cuff    4. Primary insomnia    5. DDD (degenerative disc disease), cervical    6. Moderate episode of recurrent major depressive disorder ([address]    7. Intractable migraine with aura with status migrainosus    8. Fibromyalgia    9. Headache, chronic migraine without aura, intractable, with status    10. Rotator cuff tendonitis, right    11. Chronic painful diabetic neuropathy ([address]    12. Intractable migraine with aura without status migrainosus    13. Gastro-esophageal reflux disease without esophagitis    . She complains of pain in the head, neck, left shoulder, lower back with radiation to the left arm, left elbow, left hand, right hip, right knee, right leg  She rates the pain 9/10 and describes it as sharp, burning. Current treatment regimen has helped relieve about 10% of the pain. She denies any side effects from the current pain regimen. Patient reports that since the last follow up visit the physical functioning is unchanged, family/social relationships are unchanged, mood is worse sleep patterns are worse, and that the overall functioning is worse. Patient denies misusing/abusing her narcotic pain medications or using any illegal drugs. There are No indicators for possible drug abuse, addiction or diversion problems, patient states she has been doing about the same. Ms. Klaudia Loyola is complaining of increase pain on the right side and her back going into the right leg, she states it has gotten worse. Patient states she is going for right leg procedure done for PVD and PAD. She reports her breathing has been okay, he has been off smoking. Patients blood pressure is high, she is on medications for it.      ALLERGIES: Patients list of allergies were reviewed     MEDICATIONS: Ms. Ashley Hodge list of medications were reviewed. Her current medications are   Outpatient Medications Prior to Visit   Medication Sig Dispense Refill    Continuous Blood Gluc  (FREESTYLE LISSETT 14 DAY READER) DAYO 1 each by Does not apply route 3 times daily 1 each 1    Continuous Blood Gluc Sensor (FREESTYLE LISSETT 14 DAY SENSOR) MISC 1 each by Does not apply route every 14 days 2 each 5    Continuous Blood Gluc Transmit (DEXCOM G6 TRANSMITTER) MISC 1 each by Does not apply route daily 1 each 2    Continuous Blood Gluc Sensor (DEXCOM G6 SENSOR) MISC 1 each by Does not apply route every 14 days 3 each 1    Continuous Blood Gluc  (DEXCOM G6 ) DAYO 1 each by Does not apply route daily 1 each 1    albuterol (PROVENTIL) (2.5 MG/3ML) 0.083% nebulizer solution TAKE 3 MLS BY NEBULIZATION EVERY 4 HOURS AS NEEDED FOR WHEEZING 360 mL 5    ULTICARE MINI PEN NEEDLES 31G X 6 MM MISC USE WITH INSULINS FOUR TIMES A DAY 90 each 1    oxyCODONE-acetaminophen (PERCOCET) 7.5-325 MG per tablet Take 1 tablet by mouth every 6 hours as needed for Pain (max 3-4 per day) for up to 28 days.  100 tablet 0    DULoxetine (CYMBALTA) 60 MG extended release capsule Take 1 capsule by mouth daily 30 capsule 1    QUEtiapine (SEROQUEL) 25 MG tablet Take 1-2 tablets by mouth nightly 60 tablet 1    Erenumab-aooe (AIMOVIG) 70 MG/ML SOAJ Inject 140 ml into the skin every 30 days 1 mL 0    Ubrogepant (UBRELVY) 50 MG TABS Take 1 tablet po PRN at start of headaches, can repeat in 24 hours 10 tablet 1    tiZANidine (ZANAFLEX) 4 MG tablet Take 0.5-1 tablets by mouth 2 times daily as needed (muscle spasms) 60 tablet 1    metoprolol succinate (TOPROL XL) 50 MG extended release tablet Take 1 tablet by mouth nightly 90 tablet 5    insulin glargine (BASAGLAR KWIKPEN) 100 UNIT/ML injection pen Inject 8 Units into the skin 2 times daily 5 pen 3    amLODIPine (NORVASC) 5 MG tablet Take 2 tablets by mouth daily 90 tablet 5    isosorbide mononitrate (IMDUR) 60 MG extended release tablet Take 1 tablet by mouth daily 90 tablet 5    nitroGLYCERIN (NITRODUR) 0.1 MG/HR Place 1 patch onto the skin every 24 hours 30 patch 0    docusate sodium (COLACE) 100 MG capsule Take 100 mg by mouth 2 times daily      atorvastatin (LIPITOR) 80 MG tablet TAKE 1 TABLET BY MOUTH NIGHTLY 90 tablet 5    clopidogrel (PLAVIX) 75 MG tablet TAKE ONE TABLET BY MOUTH EVERY DAY 90 tablet 5    ranolazine (RANEXA) 1000 MG extended release tablet Take 1 tablet by mouth 2 times daily 60 tablet 8    linaclotide (LINZESS) 145 MCG capsule Take 1 capsule by mouth every morning (before breakfast) 30 capsule 5    alirocumab (PRALUENT) 75 MG/ML SOAJ injection pen Inject 1 mL into the skin every 14 days 6 pen 3    omeprazole (PRILOSEC) 20 MG delayed release capsule TAKE 1 CAPSULE BY MOUTH DAILY 30 capsule 1    aspirin 81 MG chewable tablet Take 1 tablet by mouth daily 30 tablet 3    nitroGLYCERIN (NITROSTAT) 0.4 MG SL tablet Place 1 tablet under the tongue every 5 minutes as needed for Chest pain up to max of 3 total doses. If no relief after 1 dose, call 911. 25 tablet 3    albuterol sulfate HFA (VENTOLIN HFA) 108 (90 Base) MCG/ACT inhaler Inhale 2 puffs into the lungs every 4 hours as needed for Wheezing or Shortness of Breath 3 Inhaler 5    amitriptyline (ELAVIL) 10 MG tablet        No facility-administered medications prior to visit. REVIEW OF SYSTEMS:    Respiratory: Negative for apnea, chest tightness and shortness of breath or change in baseline breathing. PHYSICAL EXAM:   Nursing note and vitals reviewed. BP (!) 170/74   Pulse 76   Ht 5' 1\" (1.549 m)   Wt 120 lb (54.4 kg)   BMI 22.67 kg/m²   Constitutional: She appears well-developed and well-nourished. No acute distress. Cardiovascular: Normal rate, regular rhythm, normal heart sounds, and does not have murmur.      Pulmonary/Chest: Effort normal. No respiratory distress. She does not have wheezes in the lung fields. She has no rales. Neurological/Psychiatric:She is alert and oriented to person, place, and time. Coordination is  normal.  Her mood isAppropriate and affect is Neutral/Euthymic(normal) . Her    IMPRESSION:   1. Chronic pain syndrome    2. DDD (degenerative disc disease), lumbar    3. Tendinitis of right rotator cuff    4. DDD (degenerative disc disease), cervical    5. Fibromyalgia    6. Rotator cuff tendonitis, right    7.  Chronic painful diabetic neuropathy (HCC)        PLAN:  Informed verbal consent was obtained  -ROM/Stretching exercises as advised   -She was advised to increase fluids ( 5-7  glasses of fluid daily), limit caffeine, avoid cheese products, increase dietary fiber, increase activity and exercise as tolerated and relax regularly and enjoy meals   -Continue with Percocet 3-4 per day  -Interm history reviewed    -Imaging slides reviewed   -Continue with all other adjuvants      Current Outpatient Medications   Medication Sig Dispense Refill    Continuous Blood Gluc  (FREESTYLE LISSETT 14 DAY READER) DAYO 1 each by Does not apply route 3 times daily 1 each 1    Continuous Blood Gluc Sensor (FREESTYLE LISSETT 14 DAY SENSOR) MISC 1 each by Does not apply route every 14 days 2 each 5    Continuous Blood Gluc Transmit (DEXCOM G6 TRANSMITTER) MISC 1 each by Does not apply route daily 1 each 2    Continuous Blood Gluc Sensor (DEXCOM G6 SENSOR) MISC 1 each by Does not apply route every 14 days 3 each 1    Continuous Blood Gluc  (DEXCOM G6 ) DAYO 1 each by Does not apply route daily 1 each 1    albuterol (PROVENTIL) (2.5 MG/3ML) 0.083% nebulizer solution TAKE 3 MLS BY NEBULIZATION EVERY 4 HOURS AS NEEDED FOR WHEEZING 360 mL 5    ULTICARE MINI PEN NEEDLES 31G X 6 MM MISC USE WITH INSULINS FOUR TIMES A DAY 90 each 1    oxyCODONE-acetaminophen (PERCOCET) 7.5-325 MG per tablet Take 1 tablet by mouth every 6 hours as needed for Pain (max 3-4 per day) for up to 28 days.  100 tablet 0    DULoxetine (CYMBALTA) 60 MG extended release capsule Take 1 capsule by mouth daily 30 capsule 1    QUEtiapine (SEROQUEL) 25 MG tablet Take 1-2 tablets by mouth nightly 60 tablet 1    Erenumab-aooe (AIMOVIG) 70 MG/ML SOAJ Inject 140 ml into the skin every 30 days 1 mL 0    Ubrogepant (UBRELVY) 50 MG TABS Take 1 tablet po PRN at start of headaches, can repeat in 24 hours 10 tablet 1    tiZANidine (ZANAFLEX) 4 MG tablet Take 0.5-1 tablets by mouth 2 times daily as needed (muscle spasms) 60 tablet 1    metoprolol succinate (TOPROL XL) 50 MG extended release tablet Take 1 tablet by mouth nightly 90 tablet 5    insulin glargine (BASAGLAR KWIKPEN) 100 UNIT/ML injection pen Inject 8 Units into the skin 2 times daily 5 pen 3    amLODIPine (NORVASC) 5 MG tablet Take 2 tablets by mouth daily 90 tablet 5    isosorbide mononitrate (IMDUR) 60 MG extended release tablet Take 1 tablet by mouth daily 90 tablet 5    nitroGLYCERIN (NITRODUR) 0.1 MG/HR Place 1 patch onto the skin every 24 hours 30 patch 0    docusate sodium (COLACE) 100 MG capsule Take 100 mg by mouth 2 times daily      atorvastatin (LIPITOR) 80 MG tablet TAKE 1 TABLET BY MOUTH NIGHTLY 90 tablet 5    clopidogrel (PLAVIX) 75 MG tablet TAKE ONE TABLET BY MOUTH EVERY DAY 90 tablet 5    ranolazine (RANEXA) 1000 MG extended release tablet Take 1 tablet by mouth 2 times daily 60 tablet 8    linaclotide (LINZESS) 145 MCG capsule Take 1 capsule by mouth every morning (before breakfast) 30 capsule 5    alirocumab (PRALUENT) 75 MG/ML SOAJ injection pen Inject 1 mL into the skin every 14 days 6 pen 3    omeprazole (PRILOSEC) 20 MG delayed release capsule TAKE 1 CAPSULE BY MOUTH DAILY 30 capsule 1    aspirin 81 MG chewable tablet Take 1 tablet by mouth daily 30 tablet 3    nitroGLYCERIN (NITROSTAT) 0.4 MG SL tablet Place 1 tablet under the tongue every 5 minutes as needed for Chest pain up to max of 3 total doses. If no relief after 1 dose, call 911. 25 tablet 3    albuterol sulfate HFA (VENTOLIN HFA) 108 (90 Base) MCG/ACT inhaler Inhale 2 puffs into the lungs every 4 hours as needed for Wheezing or Shortness of Breath 3 Inhaler 5     No current facility-administered medications for this visit. I will continue her current medication regimen  which is part of the above treatment schedule. It has been helping with Ms. Meza's chronic  medical problems which for this visit include:   Diagnoses of Chronic pain syndrome, DDD (degenerative disc disease), lumbar, Tendinitis of right rotator cuff, Primary insomnia, DDD (degenerative disc disease), cervical, Moderate episode of recurrent major depressive disorder ([address] Intractable migraine with aura with status migrainosus, Fibromyalgia, Headache, chronic migraine without aura, intractable, with status, Rotator cuff tendonitis, right, Chronic painful diabetic neuropathy ([address] Intractable migraine with aura without status migrainosus, and Gastro-esophageal reflux disease without esophagitis were pertinent to this visit. Risks and benefits of the medications and other alternative treatments  including no treatment were discussed with the patient. The common side effects of these medications were also explained to the patient. Informed verbal consent was obtained. Goals of current treatment regimen include improvement in pain, restoration of functioning- with focus on improvement in physical performance, general activity, work or disability,emotional distress, health care utilization and  decreased medication consumption. Will continue to monitor progress towards achieving/maintaining therapeutic goals with special emphasis on  1. Improvement in perceived interfernce  of pain with ADL's. Ability to do home exercises independently. Ability to do household chores indoor and/or outdoor work and social and leisure activities. Improve psychosocial

## 2022-04-06 ENCOUNTER — HOSPITAL ENCOUNTER (OUTPATIENT)
Dept: CARDIAC CATH/INVASIVE PROCEDURES | Age: 67
Discharge: HOME | End: 2022-04-06
Payer: COMMERCIAL

## 2022-04-06 VITALS
RESPIRATION RATE: 16 BRPM | OXYGEN SATURATION: 100 % | DIASTOLIC BLOOD PRESSURE: 107 MMHG | SYSTOLIC BLOOD PRESSURE: 161 MMHG | HEIGHT: 61 IN | HEART RATE: 75 BPM | BODY MASS INDEX: 22.09 KG/M2 | WEIGHT: 117 LBS | TEMPERATURE: 97.5 F

## 2022-04-06 PROCEDURE — 85347 COAGULATION TIME ACTIVATED: CPT

## 2022-04-06 PROCEDURE — 36005 INJECTION EXT VENOGRAPHY: CPT

## 2022-04-06 PROCEDURE — 75825 VEIN X-RAY TRUNK: CPT

## 2022-04-06 PROCEDURE — 2500000003 HC RX 250 WO HCPCS

## 2022-04-06 PROCEDURE — 99152 MOD SED SAME PHYS/QHP 5/>YRS: CPT

## 2022-04-06 PROCEDURE — C1753 CATH, INTRAVAS ULTRASOUND: HCPCS

## 2022-04-06 PROCEDURE — 37252 INTRVASC US NONCORONARY 1ST: CPT

## 2022-04-06 PROCEDURE — 6360000002 HC RX W HCPCS

## 2022-04-06 PROCEDURE — 2709999900 HC NON-CHARGEABLE SUPPLY

## 2022-04-06 PROCEDURE — 37248 TRLUML BALO ANGIOP 1ST VEIN: CPT | Performed by: INTERNAL MEDICINE

## 2022-04-06 PROCEDURE — C1769 GUIDE WIRE: HCPCS

## 2022-04-06 PROCEDURE — C1725 CATH, TRANSLUMIN NON-LASER: HCPCS

## 2022-04-06 PROCEDURE — C1894 INTRO/SHEATH, NON-LASER: HCPCS

## 2022-04-06 PROCEDURE — 99153 MOD SED SAME PHYS/QHP EA: CPT

## 2022-04-06 PROCEDURE — 36013 PLACE CATHETER IN ARTERY: CPT

## 2022-04-06 PROCEDURE — C1760 CLOSURE DEV, VASC: HCPCS

## 2022-04-06 PROCEDURE — 36010 PLACE CATHETER IN VEIN: CPT | Performed by: INTERNAL MEDICINE

## 2022-04-06 PROCEDURE — G0269 OCCLUSIVE DEVICE IN VEIN ART: HCPCS

## 2022-04-06 PROCEDURE — 36010 PLACE CATHETER IN VEIN: CPT

## 2022-04-06 PROCEDURE — 37248 TRLUML BALO ANGIOP 1ST VEIN: CPT

## 2022-04-06 PROCEDURE — 6370000000 HC RX 637 (ALT 250 FOR IP)

## 2022-04-06 PROCEDURE — C1887 CATHETER, GUIDING: HCPCS

## 2022-04-06 PROCEDURE — 75827 VEIN X-RAY CHEST: CPT

## 2022-04-06 PROCEDURE — 6360000004 HC RX CONTRAST MEDICATION: Performed by: INTERNAL MEDICINE

## 2022-04-06 RX ORDER — SODIUM CHLORIDE 9 MG/ML
25 INJECTION, SOLUTION INTRAVENOUS PRN
Status: DISCONTINUED | OUTPATIENT
Start: 2022-04-06 | End: 2022-04-06 | Stop reason: SDUPTHER

## 2022-04-06 RX ORDER — SODIUM CHLORIDE 0.9 % (FLUSH) 0.9 %
5-40 SYRINGE (ML) INJECTION EVERY 12 HOURS SCHEDULED
Status: DISCONTINUED | OUTPATIENT
Start: 2022-04-06 | End: 2022-04-06 | Stop reason: SDUPTHER

## 2022-04-06 RX ORDER — SODIUM CHLORIDE 0.9 % (FLUSH) 0.9 %
5-40 SYRINGE (ML) INJECTION EVERY 12 HOURS SCHEDULED
Status: DISCONTINUED | OUTPATIENT
Start: 2022-04-06 | End: 2022-04-07 | Stop reason: HOSPADM

## 2022-04-06 RX ORDER — SODIUM CHLORIDE 9 MG/ML
25 INJECTION, SOLUTION INTRAVENOUS PRN
Status: DISCONTINUED | OUTPATIENT
Start: 2022-04-06 | End: 2022-04-07 | Stop reason: HOSPADM

## 2022-04-06 RX ORDER — ACETAMINOPHEN 325 MG/1
650 TABLET ORAL EVERY 4 HOURS PRN
Status: DISCONTINUED | OUTPATIENT
Start: 2022-04-06 | End: 2022-04-07 | Stop reason: HOSPADM

## 2022-04-06 RX ORDER — SODIUM CHLORIDE 0.9 % (FLUSH) 0.9 %
5-40 SYRINGE (ML) INJECTION PRN
Status: DISCONTINUED | OUTPATIENT
Start: 2022-04-06 | End: 2022-04-07 | Stop reason: HOSPADM

## 2022-04-06 RX ORDER — IODIXANOL 320 MG/ML
100 INJECTION, SOLUTION INTRAVASCULAR
Status: COMPLETED | OUTPATIENT
Start: 2022-04-06 | End: 2022-04-06

## 2022-04-06 RX ORDER — SODIUM CHLORIDE 9 MG/ML
INJECTION, SOLUTION INTRAVENOUS CONTINUOUS
Status: DISCONTINUED | OUTPATIENT
Start: 2022-04-06 | End: 2022-04-07 | Stop reason: HOSPADM

## 2022-04-06 RX ORDER — SODIUM CHLORIDE 0.9 % (FLUSH) 0.9 %
5-40 SYRINGE (ML) INJECTION PRN
Status: DISCONTINUED | OUTPATIENT
Start: 2022-04-06 | End: 2022-04-06 | Stop reason: SDUPTHER

## 2022-04-06 RX ADMIN — IODIXANOL 100 ML: 320 INJECTION, SOLUTION INTRAVASCULAR at 10:40

## 2022-04-06 NOTE — H&P
H&P Update -     History of Present Illness:     John Paul Ch is a 73 y.o. patient with a PMH significant for coronary artery disease, anxiety, diabetes, paroxsymal atrial fibrillation, diastolic heart failure, hypertension, GI bleed. and hyperlipidemia. She has not been on anticoagulation therapy due to GI bleed in the past.      CT venogram and catheter based venogram consistent with IVC  just below the renal veins      Past Medical History:   has a past medical history of Acid reflux, Anemia, Anxiety and depression, Arthritis, Asthma, Atrial fibrillation ([address] CAD (coronary artery disease), Cerebral artery occlusion with cerebral infarction ([address] CHF (congestive heart failure) ([address] Chronic kidney disease--stage III, COPD (chronic obstructive pulmonary disease) ([address] DM2 (diabetes mellitus, type 2) ([address] Dysarthria, Fibromyalgia, Headache(784.0), Hemisensory loss, History of blood transfusion, Hyperlipidemia, Hypertension, IBS (irritable bowel syndrome), Inferior vena cava occlusion ([address] Keratitis, MI, old, Neuropathy, Superior vena cava obstruction, Temporal arteritis ([address] and Wears glasses.     Surgical History:   has a past surgical history that includes Tunneled venous port placement; Cholecystectomy; ablation of dysrhythmic focus (1999  and 11/2020); Cataract removal (Bilateral); joint replacement (Right); Hysterectomy; Artery Biopsy (Right, 04/23/2014); Coronary angioplasty with stent (2020); Knee arthroscopy (Right); Percutaneous Transluminal Coronary Angio (10/2019); Upper gastrointestinal endoscopy (N/A, 7/6/2020); Carotid artery surgery (Left); Colonoscopy (N/A, 4/9/2021); and Colonoscopy (N/A, 10/15/2021).    Social History:   reports that she quit smoking about 3 years ago. Her smoking use included cigarettes. She has a 17.50 pack-year smoking history.  She has never used smokeless tobacco. She reports that she does not drink alcohol and does not use drugs.      Family History:  family history includes Cancer in her mother; Diabetes in her mother; High Blood Pressure in her mother; High Cholesterol in her mother; No Known Problems in her paternal grandfather; Stroke in her mother.     Home Medications:  Were reviewed and are listed in nursing record and/or below  Home Medications           Prior to Admission medications    Medication Sig Start Date End Date Taking?  Authorizing Provider   zoster recombinant adjuvanted vaccine [facility]) 50 MCG/0.5ML SUSR injection Inject 0.5 mLs into the muscle once for 1 dose 1/27/22 1/27/22 Yes Johnson Willoughby MD   metoprolol succinate (TOPROL XL) 50 MG extended release tablet Take 1 tablet by mouth nightly 1/27/22   Yes Johnson Willoughby MD   insulin glargine (BASAGLAR KWIKPEN) 100 UNIT/ML injection pen Inject 8 Units into the skin 2 times daily 1/27/22   Yes Johnson Willoughby MD   amLODIPine (NORVASC) 5 MG tablet Take 2 tablets by mouth daily 1/27/22   Yes Johnson Willoughby MD   isosorbide mononitrate (IMDUR) 60 MG extended release tablet Take 1 tablet by mouth daily 1/27/22   Yes Johnson Willoughby MD   nitroGLYCERIN (NITRODUR) 0.1 MG/HR Place 1 patch onto the skin every 24 hours 1/27/22   Yes Johnson Willoughby MD   docusate sodium (COLACE) 100 MG capsule Take 100 mg by mouth 2 times daily 11/29/21   Yes Historical Provider, MD   Erenumab-aooe (AIMOVIG) 70 MG/ML SOAJ INJECT 140 MLS INTO THE SKIN EVERY 30 DAYS 12/15/21   Yes Mellissa Mcbride MD   tiZANidine (ZANAFLEX) 4 MG tablet Take 0.5-1 tablets by mouth 2 times daily as needed (muscle spasms) 12/7/21   Yes Mellissa Mcbride MD   DULoxetine (CYMBALTA) 60 MG extended release capsule Take 1 capsule by mouth daily 12/7/21   Yes Mellissa Mcbride MD   Ubrogepant (UBRELVY) 50 MG TABS Take 1 tablet po PRN at start of headaches, can repeat in 24 hours 12/7/21   Yes Mellissa Mcbride MD   QUEtiapine (SEROQUEL) 25 MG tablet Take 1 tablet by mouth nightly 12/7/21   Yes Mellissa Mcbride MD   atorvastatin (LIPITOR) 80 MG tablet TAKE 1 TABLET BY MOUTH NIGHTLY 11/2/21   Yes Clemon Riedel, MD   clopidogrel (PLAVIX) 75 MG tablet TAKE ONE TABLET BY MOUTH EVERY DAY 11/2/21   Yes Clemon Riedel, MD   ranolazine (RANEXA) 1000 MG extended release tablet Take 1 tablet by mouth 2 times daily 10/29/21   Yes Jose Raul Abraham MD   linaclotide [facility]) 145 MCG capsule Take 1 capsule by mouth every morning (before breakfast) 10/15/21   Yes Tayo Guidry MD   ULTICARE MINI PEN NEEDLES 31G X 6 MM MISC USE WITH INSULINS FOUR TIMES A DAY 7/19/21   Yes Clemon Riedel, MD   alirocumab (PRALUENT) 75 MG/ML SOAJ injection pen Inject 1 mL into the skin every 14 days 3/29/21   Yes CYRUS Macias CNP   omeprazole (PRILOSEC) 20 MG delayed release capsule TAKE 1 CAPSULE BY MOUTH DAILY 3/5/21   Yes Clemon Riedel, MD   aspirin 81 MG chewable tablet Take 1 tablet by mouth daily 12/24/20   Yes Jose Garcia MD   nitroGLYCERIN (NITROSTAT) 0.4 MG SL tablet Place 1 tablet under the tongue every 5 minutes as needed for Chest pain up to max of 3 total doses. If no relief after 1 dose, call 911. 12/16/20   Yes Clemon Riedel, MD   albuterol (PROVENTIL) (2.5 MG/3ML) 0.083% nebulizer solution Take 3 mLs by nebulization every 4 hours as needed for Wheezing 3/25/20   Yes Clemon Riedel, MD   albuterol sulfate HFA (VENTOLIN HFA) 108 (90 Base) MCG/ACT inhaler Inhale 2 puffs into the lungs every 4 hours as needed for Wheezing or Shortness of Breath 12/6/19   Yes Clemon Riedel, MD            CURRENT Medications:    Current Meds Link used for Sign Out Report   No current facility-administered medications for this visit.         Allergies:  Patient has no known allergies.             Review of Systems: All reviewed and refer to HPI  · Constitutional: no unanticipated weight loss. There's been no change in energy level, sleep pattern, or activity level. No fevers, chills. · Eyes: No visual changes or diplopia. No scleral icterus.   · ENT: No Headaches, hearing loss or vertigo. No mouth sores or sore throat. · Cardiovascular: No Chest pain, tightness or discomfort. · No Shortness of breath. No Dyspnea on exertion, Orthopnea, Paroxysmal nocturnal dyspnea or breathlessness at rest.  · No Palpitations. · No Syncope ('blackouts', 'faints', 'collapse') or dizziness. · Respiratory: No cough or wheezing, no sputum production. No hematemesis. · Gastrointestinal: No abdominal pain, appetite loss, blood in stools. No change in bowel or bladder habits. · Genitourinary: No dysuria, trouble voiding, or hematuria. · Musculoskeletal:  No gait disturbance, no joint complaints. · Integumentary: No rash or pruritis. · Neurological: No headache, diplopia, change in muscle strength, numbness or tingling. · Psychiatric: No anxiety or depression. · Endocrine: No temperature intolerance. No excessive thirst, fluid intake, or urination. No tremor. · Hematologic/Lymphatic: No abnormal bruising or bleeding, blood clots or swollen lymph nodes. · Allergic/Immunologic: No nasal congestion or hives.        Objective: all reveiwed       PHYSICAL EXAM:           Vitals:     01/27/22 1331   BP: 134/76   Pulse: 75   SpO2: 99%    Weight: 105 lb (47.6 kg)       General Appearance:  Alert, cooperative, no distress, appears stated age. Head:  Normocephalic, without obvious abnormality, atraumatic. Eyes:  Pupils equal and round. No scleral icterus. Mouth: Moist mucosa, no pharyngeal erythema. Nose: Nares normal. No drainage or sinus tenderness. Neck: Supple, symmetrical, trachea midline. No adenopathy. No tenderness/mass/nodules. No carotid bruit or elevated JVD. Lungs:   Respiratory Effort: Normal   Auscultation: Clear to auscultation bilaterally, respirations unlabored. No wheeze, rales   Chest Wall:  No tenderness or deformity. Cardiovascular:     Pulses  Palpation: normal   Ascultation: Regular rate, S1/ S2 normal. No murmur, rub, or gallop.   2+ radial and pedal pulses, symmetric  Carotid  Femoral   Abdomen and Gastrointestinal:   Soft, non-tender, bowel sounds active. Liver and Spleen  Masses   Musculoskeletal: No muscle wasting  Back  Gait   Extremities: Extremities normal, atraumatic. No cyanosis or edema. No cyanosis clubbing         Skin: Inspection and palpation performed, no rashes or lesions. Pysch: Normal mood and affect. Alert and oriented to time place person   Neurologic: Normal gross motor and sensory exam.       Labs: all labs have been reviewed             Lab Results   Component Value Date     WBC 3.6 01/13/2022     RBC 2.83 01/13/2022     HGB 9.1 01/13/2022     HCT 27.2 01/13/2022     MCV 96.0 01/13/2022     RDW 14.5 01/13/2022      01/13/2022            Lab Results   Component Value Date      01/13/2022     K 4.1 01/13/2022     K 3.8 01/11/2022     CL 99 01/13/2022     CO2 22 01/13/2022     BUN 35 01/13/2022     CREATININE 1.4 01/13/2022     GFRAA 46 01/13/2022     GFRAA 60 05/23/2013     AGRATIO 0.9 01/09/2022     LABGLOM 38 01/13/2022     GLUCOSE 90 01/13/2022     PROT 8.1 01/09/2022     PROT 8.1 02/15/2013     CALCIUM 8.8 01/13/2022     BILITOT 0.4 01/09/2022     ALKPHOS 120 01/09/2022     AST 23 01/09/2022     ALT 13 01/09/2022      No results found for: PTINR        Lab Results   Component Value Date     LABA1C 7.8 01/09/2022            Lab Results   Component Value Date     CKTOTAL 41 08/22/2019     CKMB 2.3 10/06/2013     CKMBINDEX 0.9 04/01/2010     TROPONINI <0.01 01/10/2022         Cardiac, Vascular and Imaging Data: All Personally Reviewed in Detail by Myself       EKG:      Echocardiogram:   ECHO 1/11/2022  Normal LV size and wall motion. EF is   60%. Grade II diastolic dysfunction  with elevated LV filling pressures. The left atrium is mildly dilated.   Normal right ventricular size and function  Trivial Mitral and Tricuspid regurgitation.     Stress Test:   Stress test 3/11/2021   Summary    Pharmacological Stress/MPI Results:      1. Technically a satisfactory study.    2. No evidence of Ischemia by Myocardial Perfusion Imaging.    3. Gated Study shows normal LV size and Systolic function; EF is 70 %.    Cath:  Cardiac cath 18  ANGIOGRAPHY FINDINGS:  1.  Left main comes from the left coronary cusp.  YAKELIN 3 flow.  No  stenosis. 2.  Left anterior descending artery is patent.  Distal left anterior  descending artery has a 70% stenosis. 3.  Left circumflex has patent stents.  No significant stenosis. 4.  Right coronary artery has patent stents.  No significant stenosis. 5.  LV ejection fraction 60%.     SUMMARY:  Patent coronary artery stents.  Distal left anterior  descending artery 70% stenosis.     Other imagin. Probable chronic pattern of stenosis of the infrarenal IVC. 2. The bilateral common, internal and external iliac veins are normal in   caliber with vascular flow appreciated.  No obvious stenosis.  Vessels drain   through collateral flow within either lumbar vein branches or communication   with gonadal veins. 3. No other significant finding in the abdomen or pelvis.              Assessment and Plan   [facility]        I have reviewed the history and physical and examined the patient and find no relevant changes. I have reviewed with the patient and/or family the risks, benefits, and alternatives to the procedure. Pre-sedation Assessment    Patient:  Shiela Car   :   1955  Intended Procedure: venogram and IVC stenting     Vitals:    22 0859   BP: (!) 161/107   Pulse: 75   Resp: 16   Temp: 97.5 °F (36.4 °C)   SpO2: 100%       Nursing notes reviewed and agreed.   Medications reviewed  Allergies: No Known Allergies      Pre-Procedure Assessment/Plan:  ASA 2 - Patient with mild systemic disease with no functional limitations    Mallampati Airway Assessment:  Mallampati Class I - (soft palate, fauces, uvula & anterior/posterior tonsillar pillars are visible)    Level of Sedation Plan:Mild sedation    Post Procedure plan: Return to same level of care    Enma Cintron MD [id number] [facility], Interventional Cardiology, and Peripheral Vascular [address] [facility]   (C): [phone]  (O): [phone]

## 2022-04-06 NOTE — OP NOTE
[address]   Procedure Note  Patient Name: Emiliano Miner  YOB: 1955  Date of Operation: 04/06/22    Pre-operative Diagnosis:   1. Peripheral arterial disease with critical limb ischemia / claudication; bridget category ***    Post-operative Diagnosis:   1. Same     Procedures Performed:  - {Blank single:19197::\"Right\",\"Left\"} {Blank single:19197::\"brachial\",\"femoral\"} artery access under {Blank multiple:19196::\"fluroscopic\",\"ultrasound\"} guidance  - {Blank single:19197::\"Right\",\"Left\"} {Blank single:19197::\"iliofemoral\",\"brachial\"} arteriogram.  - Aortoiliac arteriogram.  - {Blank single:19197::\"Right\",\"Left\",\"Bilateral\"} lower extremity arteriogram  - Percutaneous balloon angioplasty of {Blank single:19197::\"right\",\"left\"} {Blank single:19197::\"iliac\",\"common femoral\",\"superficial femoral\",\"profunda femoris\",\"popliteal\",\"anterior tibial\",\"posterior tibial\",\"peroneal\"} artery using a *** mm x *** mm {Blank single:19197::\"\"balloon\",\"drug-coated balloon Bar[facility] [place] Lutonix)\"}. - Percutaneous insertion of {Blank single:19197::\"balloon expandable\",\"self-expanding\"} stent {Blank single:19197::\"graft\"} (*** mm x *** mm) into {Blank single:19197::\"right\",\"left\"} {Blank single:19197::\"iliac\",\"common femoral\",\"superficial femoral\",\"profunda femoris\",\"popliteal\",\"anterior tibial\",\"posterior tibial\",\"peroneal\"} artery. - Completion arteriogram.      :  Alonso Billy MD.    Assistant:  ***. Anesthesia:  {Blank single:29466::\"IV sedation and local anesthesia\",\"local anesthesia\",\"general endotracheal anesthesia\",\"general laryngeal mask airway anesthesia\"}. Moderate Sedation:  Start time: ***  Stop time: ***  *** mg versed   *** mcg fentanyl   An independent trained observer pushed medications at my direction. We monitored the patient's level of consciousness and vital signs/physiologic status throughout the procedure duration (see start and start times above). Estimated Blood Loss:  *** mL. Indications for Procedure:  Royal Thomas is a 73 y.o. female who was evaluated as an {Blank single:38494::\"outpatient\",\"inpatient\"} with {Blank single:85217::\"acute\",\"chronic\"} arterial insufficiency with {Blank single:92449::\"claudication\",\"rest pain\",\"ulceration\",\"gangrene\"} of *** and was deemed an appropriate candidate for an angiogram and possible intervention. Informed consent was obtained after discussion of potential benefits, alternatives and risks of the procedure, including but not limited to bleeding, infection, worsening of arterial insufficiency, and kidney injury due to contrast administration. Details of Procedure: The patient was taken to the cardiac cath lab and placed in supine position. {Blank single:45481::\"General\",\"IV sedation\"} anesthesia was administered by the anesthesia team. The operative area was clipped, prepared and draped in sterile fashion. A brief time out was performed per hospital protocol. The {Blank single:79407::\"right\",\"left\"} {Blank single:10904::\"brachial\",\"femoral\"} artery was accessed under {Blank multiple:44468::\"fluroscopic\",\"ultrasound\"} guidance with a micropuncture needle, wire, then sheath. A 4-Croatian sheath was inserted over a wire. An iliofemoral angiogram was performed. Using a pig tail catheter positioned at the level of the renal arteries, a aortoiliac arteriogram was performed. Renal arteries: patent  Abdominal Aorta:  {DESC; MILD/MOD/PROMINENT:45662} calcification.        Right Common Iliac:  {Percentage stenosis:83529}  Stenosis  Right Internal Iliac:  {IS/IS GCT:19802} patent  Right External Iliac:  {IS/IS ZZC:64234} patent  Left Common Iliac:  {Percentage stenosis:85589} stenosis  Left Internal Iliac:  {IS/IS GUW:52764} patent  Left External Iliac:  {IS/IS IIO:76083} patent    The catheter was then pulled back to the level of the bifurcation and a bilateral lower extremity runoff was performed:    Right Leg  Common Femoral:   ***  Profunda: patent {WITH/WITHOUT:19566} significant disease  Superficial femoral: {DESC; MILD/MOD/PROMINENT:90921} disease. Popliteal: {DESC; MILD/MOD/PROMINENT:18404} disease. Anterior Tibial: {IS/IS XQJ:66577} patent with {MILD, MOD, SEV:04378} disease  Posterior Tibial:  {IS/IS SCS:91957} patent with {MILD, MOD, SEV:82817} disease  Peroneal: {IS/IS RNS:60017} patent with {MILD, MOD, SEV:68636} disease      Left Leg  Common Femoral:   ***  Profunda: patent {WITH/WITHOUT:19566} significant disease  Superficial femoral: {DESC; MILD/MOD/PROMINENT:86865} disease. Popliteal: {DESC; MILD/MOD/PROMINENT:54704} disease. Anterior Tibial: {IS/IS XKZ:76477} patent with {MILD, MOD, SEV:33907} disease  Posterior Tibial:  {IS/IS QNZ:90044} patent with {MILD, MOD, SEV:67623} disease  Peroneal: {IS/IS DGO:67363} patent with {MILD, MOD, SEV:47176} disease    {Blank multiple:19196::\"A *** Japanese sheath was inserted over the wire into the femoral artery and positioned up-and-over into the contralateral common femoral artery\"} {Blank multiple:19196::\" \",\"after *** units of intravenous heparin was administered and three minutes allowed to elapse\"}. The {Blank single:19197::\"right\",\"left\"} {Blank multiple:19196::\"common iliac artery\",\"external iliac artery\",\"superficial femoral artery\",\"popliteal artery\",\"tibioperoneal trunk\",\"anterior tibial artery\",\"posterior tibial artery\",\"peroneal artery\"} was recanalized using a *** wire and *** catheter. Balloon angioplasty of the {Blank single:19197::\"right\",\"left\"} {Blank single:19197::\"iliac\",\"common femoral\",\"superficial femoral\",\"profunda femoris\",\"popliteal\",\"anterior tibial\",\"posterior tibial\",\"peroneal\"} artery was performed using a *** mm x *** mm{blank single:19197::\"\" \",\" drug-coated (Lutonix)\"}balloon.     A {Blank single:19197::\"balloon expandable\",\"self-expanding\"} stent {Blank single:19197::\"graft\"} (*** mm x *** mm) was inserted into the {Blank single:87105::\"right\",\"left\"} {Blank single:19197::\"iliac artery\",\"common femoral artery\",\"superficial femoral artery\",\"profunda femoris artery\",\"popliteal artery\",\"anterior tibial artery\",\"tibioperoneal trunk\",\"posterior tibial artery\",\"peroneal atery\"}. A completion arteriogram was performed. {Blank single:19197::\" \",\"The sheath was withdrawn over a wire then exchanged for a short sheath. \"}  {Blank single:19197::\"The sheath was to be removed in the recovery area and manual pressure held\",\"The sheath was removed in the operating room and *** minutes of manual pressure held\",\"A Proglide closure device was used to close the arteriotomy\",\"An Angioseal closure device was used to close the arteriotomy\",\"A Mynx closure device was used to close the arteriotomy\"}. The patient tolerated the procedure well, was awakened and was taken to the post-operative care unit in stable condition.      Pre:    Post:     Impression/Plan:     Noel Gonzalez MD [address] [place] [address], Interventional Cardiology, and Peripheral Vascular [facility]   (C): [phone]  (O): [phone]

## 2022-04-08 LAB — POC ACT LR: 335 SEC

## 2022-04-11 ENCOUNTER — TELEPHONE (OUTPATIENT)
Dept: CARDIOLOGY CLINIC | Age: 67
End: 2022-04-11

## 2022-04-11 ENCOUNTER — NURSE ONLY (OUTPATIENT)
Dept: CARDIOLOGY CLINIC | Age: 67
End: 2022-04-11

## 2022-04-11 DIAGNOSIS — R00.2 PALPITATION: Primary | ICD-10-CM

## 2022-04-11 PROCEDURE — 93225 XTRNL ECG REC<48 HRS REC: CPT | Performed by: INTERNAL MEDICINE

## 2022-04-11 NOTE — PROGRESS NOTES
Patient came into our office to get a 48 hour monitor. Patient has previously worn monitors before but was re educated on the monitor and instructions and she expressed understanding.

## 2022-04-11 NOTE — TELEPHONE ENCOUNTER
Maren called in and states she has been having irregular hearbeat sessions in the middle of the night and feels very drained after each episode. He jsut had a procedure last week.     She can be reached back at [phone]

## 2022-04-11 NOTE — TELEPHONE ENCOUNTER
Called and spoke to patient she says that she is having increased episodes of her heart racing and feeling very fatigued. I asked her if she is agreeable to wear a 48 hour monitor and she is. She will call back and let the  know when she will be able to get to the office.

## 2022-04-12 ENCOUNTER — HOSPITAL ENCOUNTER (EMERGENCY)
Age: 67
Discharge: HOME | End: 2022-04-12
Payer: COMMERCIAL

## 2022-04-12 ENCOUNTER — APPOINTMENT (OUTPATIENT)
Dept: GENERAL RADIOLOGY | Age: 67
End: 2022-04-12
Payer: COMMERCIAL

## 2022-04-12 VITALS
OXYGEN SATURATION: 98 % | SYSTOLIC BLOOD PRESSURE: 156 MMHG | RESPIRATION RATE: 16 BRPM | TEMPERATURE: 98 F | DIASTOLIC BLOOD PRESSURE: 64 MMHG | HEART RATE: 78 BPM

## 2022-04-12 DIAGNOSIS — R07.9 CHEST PAIN, UNSPECIFIED TYPE: Primary | ICD-10-CM

## 2022-04-12 LAB
A/G RATIO: 0.8 (ref 1.1–2.2)
ALBUMIN SERPL-MCNC: 3.3 G/DL (ref 3.4–5)
ALP BLD-CCNC: 155 U/L (ref 40–129)
ALT SERPL-CCNC: 14 U/L (ref 10–40)
ANION GAP SERPL CALCULATED.3IONS-SCNC: 14 MMOL/L (ref 3–16)
AST SERPL-CCNC: 24 U/L (ref 15–37)
BASOPHILS ABSOLUTE: 0 K/UL (ref 0–0.2)
BASOPHILS RELATIVE PERCENT: 0.2 %
BILIRUB SERPL-MCNC: <0.2 MG/DL (ref 0–1)
BUN BLDV-MCNC: 35 MG/DL (ref 7–20)
CALCIUM SERPL-MCNC: 9.2 MG/DL (ref 8.3–10.6)
CHLORIDE BLD-SCNC: 100 MMOL/L (ref 99–110)
CO2: 18 MMOL/L (ref 21–32)
CREAT SERPL-MCNC: 1.3 MG/DL (ref 0.6–1.2)
EKG ATRIAL RATE: 81 BPM
EKG DIAGNOSIS: NORMAL
EKG P AXIS: -14 DEGREES
EKG P-R INTERVAL: 94 MS
EKG Q-T INTERVAL: 380 MS
EKG QRS DURATION: 76 MS
EKG QTC CALCULATION (BAZETT): 441 MS
EKG R AXIS: 49 DEGREES
EKG T AXIS: 87 DEGREES
EKG VENTRICULAR RATE: 81 BPM
EOSINOPHILS ABSOLUTE: 0.3 K/UL (ref 0–0.6)
EOSINOPHILS RELATIVE PERCENT: 5.2 %
GFR AFRICAN AMERICAN: 50
GFR NON-AFRICAN AMERICAN: 41
GLUCOSE BLD-MCNC: 218 MG/DL (ref 70–99)
HCT VFR BLD CALC: 29.2 % (ref 36–48)
HEMOGLOBIN: 9.7 G/DL (ref 12–16)
LYMPHOCYTES ABSOLUTE: 1 K/UL (ref 1–5.1)
LYMPHOCYTES RELATIVE PERCENT: 19.8 %
MCH RBC QN AUTO: 31.4 PG (ref 26–34)
MCHC RBC AUTO-ENTMCNC: 33.2 G/DL (ref 31–36)
MCV RBC AUTO: 94.8 FL (ref 80–100)
MONOCYTES ABSOLUTE: 0.3 K/UL (ref 0–1.3)
MONOCYTES RELATIVE PERCENT: 5 %
NEUTROPHILS ABSOLUTE: 3.6 K/UL (ref 1.7–7.7)
NEUTROPHILS RELATIVE PERCENT: 69.8 %
PDW BLD-RTO: 14.5 % (ref 12.4–15.4)
PLATELET # BLD: 240 K/UL (ref 135–450)
PMV BLD AUTO: 7.7 FL (ref 5–10.5)
POTASSIUM REFLEX MAGNESIUM: 5.3 MMOL/L (ref 3.5–5.1)
RBC # BLD: 3.09 M/UL (ref 4–5.2)
SODIUM BLD-SCNC: 132 MMOL/L (ref 136–145)
TOTAL PROTEIN: 7.5 G/DL (ref 6.4–8.2)
TROPONIN: <0.01 NG/ML
TROPONIN: <0.01 NG/ML
WBC # BLD: 5.2 K/UL (ref 4–11)

## 2022-04-12 PROCEDURE — 99285 EMERGENCY DEPT VISIT HI MDM: CPT

## 2022-04-12 PROCEDURE — 80053 COMPREHEN METABOLIC PANEL: CPT

## 2022-04-12 PROCEDURE — 71045 X-RAY EXAM CHEST 1 VIEW: CPT

## 2022-04-12 PROCEDURE — 36415 COLL VENOUS BLD VENIPUNCTURE: CPT

## 2022-04-12 PROCEDURE — 2500000003 HC RX 250 WO HCPCS: Performed by: PHYSICIAN ASSISTANT

## 2022-04-12 PROCEDURE — 96374 THER/PROPH/DIAG INJ IV PUSH: CPT

## 2022-04-12 PROCEDURE — 2580000003 HC RX 258: Performed by: PHYSICIAN ASSISTANT

## 2022-04-12 PROCEDURE — 85025 COMPLETE CBC W/AUTO DIFF WBC: CPT

## 2022-04-12 PROCEDURE — 6370000000 HC RX 637 (ALT 250 FOR IP): Performed by: PHYSICIAN ASSISTANT

## 2022-04-12 PROCEDURE — A4216 STERILE WATER/SALINE, 10 ML: HCPCS | Performed by: PHYSICIAN ASSISTANT

## 2022-04-12 PROCEDURE — 84484 ASSAY OF TROPONIN QUANT: CPT

## 2022-04-12 RX ORDER — OXYCODONE AND ACETAMINOPHEN 7.5; 325 MG/1; MG/1
1 TABLET ORAL ONCE
Status: COMPLETED | OUTPATIENT
Start: 2022-04-12 | End: 2022-04-12

## 2022-04-12 RX ORDER — CALCIUM CITRATE/VITAMIN D3 200MG-6.25
TABLET ORAL
Qty: 150 STRIP | Refills: 11 | Status: SHIPPED | OUTPATIENT
Start: 2022-04-12 | End: 2022-05-25

## 2022-04-12 RX ORDER — ALCOHOL ANTISEPTIC PADS
PADS, MEDICATED (EA) TOPICAL
Qty: 90 EACH | Refills: 11 | Status: SHIPPED | OUTPATIENT
Start: 2022-04-12 | End: 2022-05-25

## 2022-04-12 RX ORDER — LANCING DEVICE
EACH MISCELLANEOUS
Qty: 90 EACH | Refills: 11 | Status: SHIPPED | OUTPATIENT
Start: 2022-04-12 | End: 2022-05-25

## 2022-04-12 RX ADMIN — FAMOTIDINE 20 MG: 10 INJECTION, SOLUTION INTRAVENOUS at 16:08

## 2022-04-12 RX ADMIN — OXYCODONE AND ACETAMINOPHEN 1 TABLET: 7.5; 325 TABLET ORAL at 14:47

## 2022-04-12 RX ADMIN — ALUMINA, MAGNESIA, AND SIMETHICONE ORAL SUSPENSION REGULAR STRENGTH: 1200; 1200; 120 SUSPENSION ORAL at 16:08

## 2022-04-12 ASSESSMENT — PAIN SCALES - GENERAL
PAINLEVEL_OUTOF10: 8

## 2022-04-12 ASSESSMENT — PAIN - FUNCTIONAL ASSESSMENT: PAIN_FUNCTIONAL_ASSESSMENT: 0-10

## 2022-04-12 NOTE — ED PROVIDER NOTES
**ADVANCED PRACTICE PROVIDER, I HAVE EVALUATED THIS PATIENT**        [address]      Pt Name: Wade Maher  FFN:[id number]  Azar 1955  Date of evaluation: 4/12/2022  Provider: Rhonda Conklin PA-C      Chief Complaint:    Chief Complaint   Patient presents with    Chest Pain         Nursing Notes, Past Medical Hx, Past Surgical Hx, Social Hx, Allergies, and Family Hx were all reviewed and agreed with or any disagreements were addressed in the HPI.    HPI: (Location, Duration, Timing, Severity, Quality, Assoc Sx, Context, Modifying factors)    Chief Complaint of s started feeling bad at around 1 PM to 1:30 PM today    This is a  73 y.o. female who presents stating that she recently had a procedure which unblocked arteries in the low abdomen the legs areas. She is not certain if they checked on her heart at that time. Just yesterday she was at the heart doctors office and they put a Holter monitor on her. Then this afternoon her heart fell like it was racing like A. fib and she started to feel short of breath and started to have chest pain that she states is probably an 8 out of 10 constant achy in the left chest nonradiating to back shoulder jaw or arm. She has a history of heart disease with previous intervention on that as well. Patient states that she took 3 sublingual nitroglycerin tablets and that they did not help her pain at all. She also has taken an 81 mg aspirin today and her Plavix as well. She states that she feels like she is breathing okay but may be a little short of breath. No recent cough or fevers or chills. No other acute complaint. Patient states that she did take one of her pain tablets this morning around 6 AM but has not had any since. She typically takes these about every 6 hours or so for various sources of aches and pains such as chest and head and back and neck and body.     PastMedical/Surgical EVERY 4 HOURS AS NEEDED FOR WHEEZING    ALBUTEROL SULFATE HFA (VENTOLIN HFA) 108 (90 BASE) MCG/ACT INHALER    Inhale 2 puffs into the lungs every 4 hours as needed for Wheezing or Shortness of Breath    ALIROCUMAB (PRALUENT) 75 MG/ML SOAJ INJECTION PEN    Inject 1 mL into the skin every 14 days    AMLODIPINE (NORVASC) 5 MG TABLET    Take 2 tablets by mouth daily    ASPIRIN 81 MG CHEWABLE TABLET    Take 1 tablet by mouth daily    ATORVASTATIN (LIPITOR) 80 MG TABLET    TAKE 1 TABLET BY MOUTH NIGHTLY    CLOPIDOGREL (PLAVIX) 75 MG TABLET    TAKE ONE TABLET BY MOUTH EVERY DAY    CONTINUOUS BLOOD GLUC  (DEXCOM G6 ) DAYO    1 each by Does not apply route daily    CONTINUOUS BLOOD GLUC  (FREESTYLE LISSETT 14 DAY READER) DAYO    1 each by Does not apply route 3 times daily    CONTINUOUS BLOOD GLUC SENSOR (DEXCOM G6 SENSOR) MISC    1 each by Does not apply route every 14 days    CONTINUOUS BLOOD GLUC SENSOR (FREESTYLE LISSETT 14 DAY SENSOR) MISC    1 each by Does not apply route every 14 days    CONTINUOUS BLOOD GLUC TRANSMIT (DEXCOM G6 TRANSMITTER) MISC    1 each by Does not apply route daily    DOCUSATE SODIUM (COLACE) 100 MG CAPSULE    Take 100 mg by mouth 2 times daily    DULOXETINE (CYMBALTA) 60 MG EXTENDED RELEASE CAPSULE    Take 1 capsule by mouth daily    ERENUMAB-AOOE (AIMOVIG) 70 MG/ML SOAJ    Inject 140 ml into the skin every 30 days    INSULIN GLARGINE (BASAGLAR KWIKPEN) 100 UNIT/ML INJECTION PEN    Inject 8 Units into the skin 2 times daily    ISOSORBIDE MONONITRATE (IMDUR) 60 MG EXTENDED RELEASE TABLET    Take 1 tablet by mouth daily    LINACLOTIDE (LINZESS) 145 MCG CAPSULE    Take 1 capsule by mouth every morning (before breakfast)    METOPROLOL SUCCINATE (TOPROL XL) 50 MG EXTENDED RELEASE TABLET    Take 1 tablet by mouth nightly    NITROGLYCERIN (NITRODUR) 0.1 MG/HR    Place 1 patch onto the skin every 24 hours    NITROGLYCERIN (NITROSTAT) 0.4 MG SL TABLET    Place 1 tablet under the tongue every 5 minutes as needed for Chest pain up to max of 3 total doses. If no relief after 1 dose, call 911. OMEPRAZOLE (PRILOSEC) 20 MG DELAYED RELEASE CAPSULE    TAKE 1 CAPSULE BY MOUTH DAILY    OXYCODONE-ACETAMINOPHEN (PERCOCET) 7.5-325 MG PER TABLET    Take 1 tablet by mouth every 6 hours as needed for Pain (max 3-4 per day) for up to 28 days. PHARMACIST CHOICE LANCETS MISC    USE TO TEST BLOOD GLUCOSE TWICE DAILY    QUETIAPINE (SEROQUEL) 25 MG TABLET    Take 1-2 tablets by mouth nightly    RANOLAZINE (RANEXA) 1000 MG EXTENDED RELEASE TABLET    Take 1 tablet by mouth 2 times daily    TIZANIDINE (ZANAFLEX) 4 MG TABLET    Take 0.5-1 tablets by mouth 2 times daily as needed (muscle spasms)    TRUE METRIX BLOOD GLUCOSE TEST STRIP    USE TO TEST BLOOD GLUCOSE TWICE DAILY    UBROGEPANT (UBRELVY) 50 MG TABS    Take 1 tablet po PRN at start of headaches, can repeat in 24 hours    ULTICARE ALCOHOL SWABS 70 % PADS    USE TO TEST BLOOD GLUCOSE TWICE DAILY    ULTICARE MINI PEN NEEDLES 31G X 6 MM MISC    USE WITH INSULINS FOUR TIMES A DAY         Review of Systems:  (2-9 systems needed)  Review of Systems  Positive history as above with no recent fevers or chills acute fall contusion or twisting injury or known overuse injury. No headache vision change neck or stiffness. Positive for symptoms as above with a feeling of heart racing as well as the pain and the feeling of maybe a bit increased work of breathing. However no recent cough or congestion. No abdominal pain or difficulty eating or drinking or extremity acute changes or rash. \"Positives and Pertinent negatives as per HPI\"    Physical Exam:  Physical Exam  Vitals and nursing note reviewed. Constitutional:       Appearance: Normal appearance. She is not diaphoretic. HENT:      Head: Normocephalic and atraumatic.       Right Ear: External ear normal.      Left Ear: External ear normal.      Nose: Nose normal.      Mouth/Throat:      Mouth: Mucous membranes are moist.      Pharynx: No posterior oropharyngeal erythema. Eyes:      General:         Right eye: No discharge. Left eye: No discharge. Conjunctiva/sclera: Conjunctivae normal.   Cardiovascular:      Rate and Rhythm: Normal rate and regular rhythm. Pulses: Normal pulses. Heart sounds: Normal heart sounds. No murmur heard. No gallop. Pulmonary:      Effort: Pulmonary effort is normal. No respiratory distress. Breath sounds: Normal breath sounds. No wheezing, rhonchi or rales. Abdominal:      General: Bowel sounds are normal. There is no distension. Palpations: Abdomen is soft. Tenderness: There is no abdominal tenderness. There is no guarding or rebound. Musculoskeletal:         General: No swelling, tenderness, deformity or signs of injury. Normal range of motion. Cervical back: Normal range of motion and neck supple. No rigidity or tenderness. Right lower leg: No edema. Left lower leg: No edema. Lymphadenopathy:      Cervical: No cervical adenopathy. Skin:     General: Skin is warm and dry. Capillary Refill: Capillary refill takes less than 2 seconds. Findings: No rash. Neurological:      Mental Status: She is alert and oriented to person, place, and time. Mental status is at baseline.    Psychiatric:         Mood and Affect: Mood normal.         Behavior: Behavior normal.         MEDICAL DECISION MAKING    Vitals:    Vitals:    04/12/22 1645 04/12/22 1700 04/12/22 1830 04/12/22 1845   BP: (!) 160/72      Pulse: 73 75 77 75   Resp: 19 15 16 15   Temp:       TempSrc:       SpO2: 94% 99% 98% 98%       LABS:  Labs Reviewed   CBC WITH AUTO DIFFERENTIAL - Abnormal; Notable for the following components:       Result Value    RBC 3.09 (*)     Hemoglobin 9.7 (*)     Hematocrit 29.2 (*)     All other components within normal limits   COMPREHENSIVE METABOLIC PANEL W/ REFLEX TO MG FOR LOW K - Abnormal; Notable for the following components: Sodium 132 (*)     Potassium reflex Magnesium 5.3 (*)     CO2 18 (*)     Glucose 218 (*)     BUN 35 (*)     CREATININE 1.3 (*)     GFR Non- 41 (*)     GFR  50 (*)     Albumin 3.3 (*)     Albumin/Globulin Ratio 0.8 (*)     Alkaline Phosphatase 155 (*)     All other components within normal limits   TROPONIN   TROPONIN        Remainder of labs reviewed and were negative at this time or not returned at the time of this note. RADIOLOGY:   Non-plain film images such as CT, Ultrasound and MRI are read by the radiologist. Adin Christopher PA-C have directly visualized the radiologic plain film image(s) with the below findings:      Interpretation per the Radiologist below, if available at the time of this note:    XR CHEST PORTABLE   Final Result   No acute cardiopulmonary findings              XR CHEST PORTABLE    Result Date: 4/12/2022  EXAMINATION: ONE XRAY VIEW OF THE CHEST 4/12/2022 2:49 pm COMPARISON: None. HISTORY: ORDERING SYSTEM PROVIDED HISTORY: chest pain TECHNOLOGIST PROVIDED HISTORY: Reason for exam:->chest pain Reason for Exam: chest pain FINDINGS: No acute airspace infiltrate. No pneumothorax or pleural effusion. Cardiomediastinal silhouette is within normal limits. Metallic density projects over the mediastinum, indeterminate     No acute cardiopulmonary findings          MEDICAL DECISION MAKING / ED COURSE:      PROCEDURES:   Procedures    None    Patient was given:  Medications   oxyCODONE-acetaminophen (PERCOCET) 7.5-325 MG per tablet 1 tablet (1 tablet Oral Given 4/12/22 1447)   famotidine (PEPCID) 20 mg in sodium chloride (PF) 10 mL injection (20 mg IntraVENous Given 4/12/22 1608)   aluminum & magnesium hydroxide-simethicone (MAALOX) 30 mL, lidocaine viscous hcl (XYLOCAINE) 5 mL (GI COCKTAIL) ( Oral Given 4/12/22 1608)     Patient presents and evaluation and treatment is begun here including EKG and chest x-ray being obtained. EKG interpreted by ED physician. Please see that note for details. No acute worrisome ST changes present at this time or concerning rhythm or A. fib. Medication for comfort given. Chest x-ray negative as above. No suspicion of lower respiratory tract infection such as pneumonia. No tachycardia tachypnea or hypoxia. Patient with syncope or near syncope or hemoptysis and at this time there is no suspicion of acute PE. Patient's lab work comes back showing no acute elevated white cell count or thrombocytopenia on the CBC. Comprehensive metabolic shows blood glucose 218 consistent with patient's known diabetes. Chronic renal disease also demonstrated without significant worsening with a GFR of 50. Sodium 132 and potassium 5.3 with hemoptysis. Patient indicates after her first round of medication for comfort that she is still having some chest pain. However after some IV Pepcid and GI cocktail, patient rested very comfortably throughout the rest of her ER stay. Patient is initial troponin comes back less than 0.01. With patient's previous coronary artery stenosis requiring dilatation before, a repeat 3-hour troponin for a delta troponin is very appropriate for this patient. Dr. Nataliia Damico, cardiology, also was consulted on this patient and feels that if the troponins come back negative that she would be very appropriate for outpatient follow-up in the office in the next 1 to 2 days and that she should call for that appointment. Patient is updated on that plan and agrees. The delta troponin now has returned and is a change of 0. Patient is updated and, as she already knows the has been kept in the loop concerning the discharge home plan, verbalizes understanding and agreement and appreciation of care given. She will be discharged as follows.     Home in stable condition to continue your home medications as prescribed, and rest.  Follow-up outpatient with your cardiologist by giving them a call tomorrow morning to arrange further outpatient care and treatment. Return to the emergency department for any emergency worsening or concern    The patient tolerated their visit well. I evaluated the patient. The physician was available for consultation as needed. The patient and / or the family were informed of the results of any tests, a time was given to answer questions, a plan was proposed and they agreed with plan. CLINICAL IMPRESSION:  1.  Chest pain, unspecified type        DISPOSITION        PATIENT REFERRED TO:  Troy Schofield MD  [address] [place] [address]  [phone]    Call in 1 day        DISCHARGE MEDICATIONS:  New Prescriptions    No medications on file       DISCONTINUED MEDICATIONS:  Discontinued Medications    No medications on file              (Please note the MDM and HPI sections of this note were completed with a voice recognition program.  Efforts were made to edit the dictations but occasionally words are mis-transcribed.)    Electronically signed, Salo Patel PA-C,          Salo Patel PA-C  04/12/22 0392

## 2022-04-12 NOTE — ED NOTES
Pt reports CP that started approx 30 min prior to arrival. Pt reports feelings of palpitations over last week or so. Pt reports that she went to doctor and had external monitor placed yesterday. Pt reports some SOB as well. Pt sts she took 3 NGT tabs at home without relief. Pt took 81mg ASA as well.       Mariluz Gamble RN  04/12/22 8235

## 2022-04-12 NOTE — ED NOTES
Pt placed on CMU, pulse ox, and NIBP. Pt given call light and instructed on its use. No new needs at this time. Will cont to monitor.       Tete Smi RN  04/12/22 5691

## 2022-04-12 NOTE — TELEPHONE ENCOUNTER
Maren called in this afternoon stating that her irregular heartbeat is getting worse, she is having sob with it and chest pain.      Called the back line and transferred the call to Dilma Laureano

## 2022-04-12 NOTE — ED NOTES
Pt calm and resting quietly with equal rise and fall of chest. Pt in NAD, RR even and unlabored. Side rails up, bed locked and in lowest position. Pt updated on plan of care. No needs at this time. Pt instructed on use of call light if needed.       Katarzyna Recinos RN  04/12/22 1908

## 2022-04-12 NOTE — TELEPHONE ENCOUNTER
Medication:   Requested Prescriptions     Pending Prescriptions Disp Refills    TRUE METRIX BLOOD GLUCOSE TEST strip [Pharmacy Med Name: True Metrix Glucose Test Strip] 150 strip 11     Sig: USE TO TEST BLOOD GLUCOSE TWICE DAILY    UltiCare Alcohol Swabs 70 % PADS [Pharmacy Med Name: alcohol swabs]  11     Sig: USE TO TEST BLOOD GLUCOSE TWICE DAILY    Pharmacist Choice Lancets [facility] [Pharmacy Med Name: Ultra Thin Lancets 31 gauge]  11     Sig: USE TO TEST BLOOD GLUCOSE TWICE DAILY       Last Filled:      Patient Phone Number: [phone] (home)     Last appt: 1/27/2022   Next appt: Visit date not found    Last Labs DM:   Lab Results   Component Value Date    LABA1C 7.8 01/09/2022

## 2022-04-12 NOTE — TELEPHONE ENCOUNTER
Spoke with patient to get a better understanding of how she was feeling, she denied having any dizziness. States she has a heaviness in her chest and her irregular heartbeat is getting worse. It was very hard for her to breathe while flat on her back along with having some abdominal swelling. Patient then spoke with Ivon Scanlon in regards to her symptoms.

## 2022-04-12 NOTE — TELEPHONE ENCOUNTER
If spoke with patient and she states she is feeling terrible is feeling short of breath and his having palpitations and chest discomfort that is occurring regardless of what activity she is doing. She sounds short of breath when speaking to her. I advised that she should seek treatment in the ED to be evaluated to her symptoms. Verbalized understanding and stated that she was calling 911. I instructed her to call us back with update.

## 2022-04-13 RX ORDER — CHLORHEXIDINE GLUCONATE 213 G/1000ML
SOLUTION TOPICAL
Qty: 1 EACH | Refills: 0 | Status: ON HOLD | OUTPATIENT
Start: 2022-05-09 | End: 2022-05-16 | Stop reason: HOSPADM

## 2022-04-13 NOTE — TELEPHONE ENCOUNTER
Polly Redmond 1/13/2022 referred for Watchman procedure.   Dr. Polly Redmond consulted on 1/27/2022   shared decision  To be done with Dr. Alecia Swanson on 4/21/22 at 3:30  EDUARD to be done the day of the procedure  Insurance information to be to [facility] for pre authorization

## 2022-04-13 NOTE — TELEPHONE ENCOUNTER
Spoke with patient's daughter Halima Vick  today and informed her of the procedure that is being scheduled on 5/16/2022 at 6 AM arrival.  Lab work has been order instructed to have done at [address] on 5/11/2022. Also informed to get Hibiclens bath at their Pharmacy. All procedure instructions were e-mailed to patient. Instructed to contact me with any questions prior to procedure.

## 2022-04-19 DIAGNOSIS — I25.10 CORONARY ARTERY DISEASE INVOLVING NATIVE CORONARY ARTERY OF NATIVE HEART WITHOUT ANGINA PECTORIS: ICD-10-CM

## 2022-04-19 RX ORDER — ASPIRIN 81 MG/1
TABLET, COATED ORAL
Qty: 90 TABLET | Refills: 5 | Status: SHIPPED | OUTPATIENT
Start: 2022-04-19

## 2022-04-19 NOTE — TELEPHONE ENCOUNTER
Medication:   Requested Prescriptions     Pending Prescriptions Disp Refills    ASPIRIN LOW DOSE 81 MG EC tablet [Pharmacy Med Name: ASPIRIN LOW DOSE 81 MG TBEC 81 Tablet] 90 tablet 5     Sig: TAKE 1 TABLET BY MOUTH DAILY        Last Filled:      Patient Phone Number: [phone] (home)     Last appt: 1/27/2022   Next appt: Visit date not found    Last OARRS:   RX Monitoring 4/9/2019   Attestation The Prescription Monitoring Report for this patient was reviewed today.

## 2022-04-21 ENCOUNTER — OFFICE VISIT (OUTPATIENT)
Dept: CARDIOLOGY CLINIC | Age: 67
End: 2022-04-21
Payer: COMMERCIAL

## 2022-04-21 VITALS
OXYGEN SATURATION: 98 % | WEIGHT: 116 LBS | DIASTOLIC BLOOD PRESSURE: 76 MMHG | HEIGHT: 60 IN | BODY MASS INDEX: 22.78 KG/M2 | HEART RATE: 78 BPM | SYSTOLIC BLOOD PRESSURE: 136 MMHG

## 2022-04-21 DIAGNOSIS — I48.0 PAROXYSMAL ATRIAL FIBRILLATION (HCC): Primary | ICD-10-CM

## 2022-04-21 PROCEDURE — 1090F PRES/ABSN URINE INCON ASSESS: CPT | Performed by: INTERNAL MEDICINE

## 2022-04-21 PROCEDURE — 93000 ELECTROCARDIOGRAM COMPLETE: CPT | Performed by: INTERNAL MEDICINE

## 2022-04-21 PROCEDURE — G8400 PT W/DXA NO RESULTS DOC: HCPCS | Performed by: INTERNAL MEDICINE

## 2022-04-21 PROCEDURE — G8420 CALC BMI NORM PARAMETERS: HCPCS | Performed by: INTERNAL MEDICINE

## 2022-04-21 PROCEDURE — 1123F ACP DISCUSS/DSCN MKR DOCD: CPT | Performed by: INTERNAL MEDICINE

## 2022-04-21 PROCEDURE — 4040F PNEUMOC VAC/ADMIN/RCVD: CPT | Performed by: INTERNAL MEDICINE

## 2022-04-21 PROCEDURE — G8427 DOCREV CUR MEDS BY ELIG CLIN: HCPCS | Performed by: INTERNAL MEDICINE

## 2022-04-21 PROCEDURE — 1036F TOBACCO NON-USER: CPT | Performed by: INTERNAL MEDICINE

## 2022-04-21 PROCEDURE — 3017F COLORECTAL CA SCREEN DOC REV: CPT | Performed by: INTERNAL MEDICINE

## 2022-04-21 PROCEDURE — 93227 XTRNL ECG REC<48 HR R&I: CPT | Performed by: INTERNAL MEDICINE

## 2022-04-21 PROCEDURE — 99214 OFFICE O/P EST MOD 30 MIN: CPT | Performed by: INTERNAL MEDICINE

## 2022-04-21 NOTE — PROGRESS NOTES
 Superior vena cava obstruction     Temporal arteritis ([facility] [address] 2/24/2014    Wears glasses      Past Surgical History:   Procedure Laterality Date    ABLATION OF DYSRHYTHMIC FOCUS  1999  and 11/2020    times 2    ARTERY BIOPSY Right 04/23/2014    RIGHT TEMPORAL ARTERY BIOPSY    CAROTID ARTERY SURGERY Left     clean up per pt    CATARACT REMOVAL Bilateral     CHOLECYSTECTOMY      COLONOSCOPY N/A 4/9/2021    COLONOSCOPY WITH BIOPSY performed by Fredrick Angelucci, MD at [address] [facility] 10/15/2021    COLONOSCOPY performed by Fredrick Angelucci, MD at [address]    HYSTERECTOMY      JOINT REPLACEMENT Right     KNEE ARTHROSCOPY Right     PTCA  10/2019    LAD and RCA inrtervention    TUNNELED VENOUS PORT PLACEMENT      left thigh.   SMART PORT-----Removed--total of 4 port placement and removal    UPPER GASTROINTESTINAL ENDOSCOPY N/A 7/6/2020    EGD DIAGNOSTIC ONLY performed by Mary Simon MD at [address] History   Problem Relation Age of Onset    Diabetes Mother     High Cholesterol Mother     Stroke Mother     Cancer Mother     High Blood Pressure Mother     No Known Problems Paternal Grandfather         lung issues      Social History     Tobacco Use    Smoking status: Former Smoker     Packs/day: 0.50     Years: 35.00     Pack years: 17.50     Types: Cigarettes     Quit date: 7/1/2018     Years since quitting: 3.8    Smokeless tobacco: Never Used    Tobacco comment: 5/13/15 has not smoked since hospitalization - kh   Vaping Use    Vaping Use: Never used   Substance Use Topics    Alcohol use: No     Alcohol/week: 0.0 standard drinks    Drug use: Never       No Known Allergies  Current Outpatient Medications   Medication Sig Dispense Refill    ASPIRIN LOW DOSE 81 MG EC tablet TAKE 1 TABLET BY MOUTH DAILY 90 tablet 5    [START ON 5/9/2022] chlorhexidine (HIBICLENS) 4 % external liquid Wash from neck down the night before or morning of the procedure 1 each 0    TRUE METRIX BLOOD GLUCOSE TEST strip USE TO TEST BLOOD GLUCOSE TWICE DAILY 150 strip 11    UltiCare Alcohol Swabs 70 % PADS USE TO TEST BLOOD GLUCOSE TWICE DAILY 90 each 11    Pharmacist Choice Lancets MISC USE TO TEST BLOOD GLUCOSE TWICE DAILY 90 each 11    oxyCODONE-acetaminophen (PERCOCET) 7.5-325 MG per tablet Take 1 tablet by mouth every 6 hours as needed for Pain (max 3-4 per day) for up to 28 days.  100 tablet 0    DULoxetine (CYMBALTA) 60 MG extended release capsule Take 1 capsule by mouth daily 30 capsule 1    QUEtiapine (SEROQUEL) 25 MG tablet Take 1-2 tablets by mouth nightly 60 tablet 1    Ubrogepant (UBRELVY) 50 MG TABS Take 1 tablet po PRN at start of headaches, can repeat in 24 hours 16 tablet 1    tiZANidine (ZANAFLEX) 4 MG tablet Take 0.5-1 tablets by mouth 2 times daily as needed (muscle spasms) 60 tablet 1    Continuous Blood Gluc  (FREESTYLE LISSETT 14 DAY READER) DAYO 1 each by Does not apply route 3 times daily 1 each 1    Continuous Blood Gluc Sensor (FREESTYLE LISSETT 14 DAY SENSOR) MISC 1 each by Does not apply route every 14 days 2 each 5    Continuous Blood Gluc Transmit (DEXCOM G6 TRANSMITTER) MISC 1 each by Does not apply route daily 1 each 2    Continuous Blood Gluc Sensor (DEXCOM G6 SENSOR) MISC 1 each by Does not apply route every 14 days 3 each 1    Continuous Blood Gluc  (DEXCOM G6 ) DAYO 1 each by Does not apply route daily 1 each 1    albuterol (PROVENTIL) (2.5 MG/3ML) 0.083% nebulizer solution TAKE 3 MLS BY NEBULIZATION EVERY 4 HOURS AS NEEDED FOR WHEEZING 360 mL 5    ULTICARE MINI PEN NEEDLES 31G X 6 MM MISC USE WITH INSULINS FOUR TIMES A DAY 90 each 1    metoprolol succinate (TOPROL XL) 50 MG extended release tablet Take 1 tablet by mouth nightly 90 tablet 5    insulin glargine (BASAGLAR KWIKPEN) 100 UNIT/ML injection pen Inject 8 Units into the skin 2 times daily 5 pen 3    amLODIPine (NORVASC) 5 MG tablet Take 2 tablets by mouth daily 90 tablet 5    isosorbide mononitrate (IMDUR) 60 MG extended release tablet Take 1 tablet by mouth daily 90 tablet 5    nitroGLYCERIN (NITRODUR) 0.1 MG/HR Place 1 patch onto the skin every 24 hours 30 patch 0    docusate sodium (COLACE) 100 MG capsule Take 100 mg by mouth 2 times daily      atorvastatin (LIPITOR) 80 MG tablet TAKE 1 TABLET BY MOUTH NIGHTLY 90 tablet 5    clopidogrel (PLAVIX) 75 MG tablet TAKE ONE TABLET BY MOUTH EVERY DAY 90 tablet 5    ranolazine (RANEXA) 1000 MG extended release tablet Take 1 tablet by mouth 2 times daily 60 tablet 8    linaclotide (LINZESS) 145 MCG capsule Take 1 capsule by mouth every morning (before breakfast) 30 capsule 5    omeprazole (PRILOSEC) 20 MG delayed release capsule TAKE 1 CAPSULE BY MOUTH DAILY 30 capsule 1    aspirin 81 MG chewable tablet Take 1 tablet by mouth daily 30 tablet 3    nitroGLYCERIN (NITROSTAT) 0.4 MG SL tablet Place 1 tablet under the tongue every 5 minutes as needed for Chest pain up to max of 3 total doses. If no relief after 1 dose, call 911. 25 tablet 3    albuterol sulfate HFA (VENTOLIN HFA) 108 (90 Base) MCG/ACT inhaler Inhale 2 puffs into the lungs every 4 hours as needed for Wheezing or Shortness of Breath 3 Inhaler 5     No current facility-administered medications for this visit. Review of Systems:  · Constitutional: no unanticipated weight loss. There's been no change in energy level, sleep pattern, or activity level. No fevers, chills. · Eyes: No visual changes or diplopia. No scleral icterus. · ENT: No Headaches, hearing loss or vertigo. No mouth sores or sore throat. · Cardiovascular: as reviewed in HPI  · Respiratory: No cough or wheezing, no sputum production. No hematemesis. · Gastrointestinal: No abdominal pain, appetite loss, blood in stools. No change in bowel or bladder habits.   · Genitourinary: No dysuria, trouble voiding, or hematuria. · Musculoskeletal:  No gait disturbance, no joint complaints. · Integumentary: No rash or pruritis. · Neurological: No headache, diplopia, change in muscle strength, numbness or tingling. · Psychiatric: No anxiety or depression. · Endocrine: No temperature intolerance. No excessive thirst, fluid intake, or urination. No tremor. · Hematologic/Lymphatic: No abnormal bruising or bleeding, blood clots or swollen lymph nodes. · Allergic/Immunologic: No nasal congestion or hives. Physical Exam:   /76   Pulse 78   Ht 5' (1.524 m)   Wt 116 lb (52.6 kg)   SpO2 98%   BMI 22.65 kg/m²   Wt Readings from Last 3 Encounters:   04/21/22 116 lb (52.6 kg)   04/06/22 117 lb (53.1 kg)   04/05/22 120 lb (54.4 kg)     Constitutional: She is oriented to person, place, and time. She appears well-developed and well-nourished. In no acute distress. Head: Normocephalic and atraumatic. Pupils equal and round. Neck: Neck supple. No JVP or carotid bruit appreciated. No mass and no thyromegaly present. No lymphadenopathy present. Cardiovascular: Normal rate. Normal heart sounds. Exam reveals no gallop and no friction rub. No murmur heard. Pulmonary/Chest: Effort normal and breath sounds normal. No respiratory distress. She has no wheezes, rhonchi or rales. Abdominal: Soft, non-tender. Bowel sounds are normal. She exhibits no organomegaly, mass or bruit. Extremities: No edema, cyanosis, or clubbing. Pulses are 2+ radial/dorsalis pedis/posterior tibial/carotid bilaterally. Neurological: No gross cranial nerve deficit. Coordination normal.   Skin: Skin is warm and dry. There is no rash or diaphoresis. Psychiatric: She has a normal mood and affect.  Her speech is normal and behavior is normal.     Lab Review:   Lab Results   Component Value Date    TRIG 131 08/29/2019    HDL 70 08/29/2019    HDL 58 05/14/2012    LDLCALC 89 08/29/2019    LABVLDL 26 08/29/2019      Lab Results   Component Value Date    BUN 35 04/12/2022    CREATININE 1.3 04/12/2022       EKG Interpretation: 4/21/21: Normal sinus rhythm. Prior anteroseptal MI    Image Review:     Assessment/Plan:     Atrial Fibrillation  Presents per my partner for consideration of the Watchman LAAC device implant  Hx of CKD-following Nephrology, anemia, AFIB, GIB, vaginal bleeding . She notes increase in AFIB episodes with c/o fatigue, tiredness, heart racing, wakes her up at night. Once she is out of the episode, those symptoms go away. Lasting a few minutes, daily. This occur several times per day. She denies any other cardiac symptoms today but reports she is currently suffering from a migraine. She also denies any further bleeding since 1/2022. Last H/H 4/12/22. Atrial fibrillation - Patient is high risk for stroke or systemic thromboembolism. Patient [id number]  is 4 . Treatment options for atrial fibrillation discussed including rate control, anticoagulation, antiarrhythmics, cardioversion and possible ablation. she is a poor long-term anticoagulation candidate. Specifically regarding risk of anticoagulation patient has: Vaginal bleeding, GI bleeding increased risk of falls due to dizziness and evidence of anemia  Continuing work-up for the watchman procedure seems appropriate. she is a candidate for short term anticoagulation therapy. We have discussed patient unique stroke and bleeding risk both on and off oral-anticoagulation, and the rationale for this referral.  Based on both stroke and bleeding risk, a shared decision has been made to pursue closure of the left atrial appendage as an alternative to oral anticoagulant therapy for stroke prophylaxis and to reduce their long term risk of incidence of bleeding. CAD  She currently denies any angina    PAD  Left groin has healed s/p procedure per Dr. Luis Gallardo earlier this month.        We will update the Watchman TEAM       Thank you very much for allowing me to participate in the care of your patient. Please do not hesitate to contact me if you have any questions. Sincerely,  Gerald Mayers MD      [facility] [address]  [place] [address], [place] [address]  Ph: [phone]  Fax: [phone]    This note was scribed in the presence of Dr. Susan Cortez MD by Kusum Harris RN.

## 2022-04-22 DIAGNOSIS — R00.2 PALPITATION: ICD-10-CM

## 2022-05-03 ENCOUNTER — OFFICE VISIT (OUTPATIENT)
Dept: PAIN MANAGEMENT | Age: 67
End: 2022-05-03
Payer: COMMERCIAL

## 2022-05-03 VITALS
RESPIRATION RATE: 16 BRPM | SYSTOLIC BLOOD PRESSURE: 122 MMHG | DIASTOLIC BLOOD PRESSURE: 74 MMHG | OXYGEN SATURATION: 98 % | HEIGHT: 61 IN | HEART RATE: 75 BPM | WEIGHT: 115 LBS | BODY MASS INDEX: 21.71 KG/M2

## 2022-05-03 DIAGNOSIS — M79.7 FIBROMYALGIA: ICD-10-CM

## 2022-05-03 DIAGNOSIS — E11.40 CHRONIC PAINFUL DIABETIC NEUROPATHY (HCC): ICD-10-CM

## 2022-05-03 DIAGNOSIS — M50.30 DDD (DEGENERATIVE DISC DISEASE), CERVICAL: ICD-10-CM

## 2022-05-03 DIAGNOSIS — G89.4 CHRONIC PAIN SYNDROME: ICD-10-CM

## 2022-05-03 DIAGNOSIS — M75.81 TENDINITIS OF RIGHT ROTATOR CUFF: ICD-10-CM

## 2022-05-03 DIAGNOSIS — M51.36 DDD (DEGENERATIVE DISC DISEASE), LUMBAR: ICD-10-CM

## 2022-05-03 DIAGNOSIS — M75.81 ROTATOR CUFF TENDONITIS, RIGHT: ICD-10-CM

## 2022-05-03 PROCEDURE — G8427 DOCREV CUR MEDS BY ELIG CLIN: HCPCS | Performed by: INTERNAL MEDICINE

## 2022-05-03 PROCEDURE — 2022F DILAT RTA XM EVC RTNOPTHY: CPT | Performed by: INTERNAL MEDICINE

## 2022-05-03 PROCEDURE — 3017F COLORECTAL CA SCREEN DOC REV: CPT | Performed by: INTERNAL MEDICINE

## 2022-05-03 PROCEDURE — 1090F PRES/ABSN URINE INCON ASSESS: CPT | Performed by: INTERNAL MEDICINE

## 2022-05-03 PROCEDURE — 1036F TOBACCO NON-USER: CPT | Performed by: INTERNAL MEDICINE

## 2022-05-03 PROCEDURE — G8400 PT W/DXA NO RESULTS DOC: HCPCS | Performed by: INTERNAL MEDICINE

## 2022-05-03 PROCEDURE — 99213 OFFICE O/P EST LOW 20 MIN: CPT | Performed by: INTERNAL MEDICINE

## 2022-05-03 PROCEDURE — G8420 CALC BMI NORM PARAMETERS: HCPCS | Performed by: INTERNAL MEDICINE

## 2022-05-03 PROCEDURE — 3051F HG A1C>EQUAL 7.0%<8.0%: CPT | Performed by: INTERNAL MEDICINE

## 2022-05-03 PROCEDURE — 4040F PNEUMOC VAC/ADMIN/RCVD: CPT | Performed by: INTERNAL MEDICINE

## 2022-05-03 PROCEDURE — 1123F ACP DISCUSS/DSCN MKR DOCD: CPT | Performed by: INTERNAL MEDICINE

## 2022-05-03 RX ORDER — UBROGEPANT 50 MG/1
TABLET ORAL
Qty: 16 TABLET | Refills: 1 | Status: SHIPPED | OUTPATIENT
Start: 2022-05-03 | End: 2022-05-25

## 2022-05-03 RX ORDER — TIZANIDINE 4 MG/1
2-4 TABLET ORAL 2 TIMES DAILY PRN
Qty: 60 TABLET | Refills: 1 | Status: SHIPPED | OUTPATIENT
Start: 2022-05-03 | End: 2022-05-31 | Stop reason: SDUPTHER

## 2022-05-03 RX ORDER — OXYCODONE AND ACETAMINOPHEN 7.5; 325 MG/1; MG/1
1 TABLET ORAL EVERY 6 HOURS PRN
Qty: 100 TABLET | Refills: 0 | Status: SHIPPED | OUTPATIENT
Start: 2022-05-03 | End: 2022-05-31 | Stop reason: SDUPTHER

## 2022-05-03 RX ORDER — QUETIAPINE FUMARATE 25 MG/1
25-50 TABLET, FILM COATED ORAL NIGHTLY
Qty: 60 TABLET | Refills: 1 | Status: SHIPPED | OUTPATIENT
Start: 2022-05-03 | End: 2022-05-31 | Stop reason: SDUPTHER

## 2022-05-03 RX ORDER — DULOXETIN HYDROCHLORIDE 60 MG/1
60 CAPSULE, DELAYED RELEASE ORAL DAILY
Qty: 30 CAPSULE | Refills: 1 | Status: SHIPPED | OUTPATIENT
Start: 2022-05-03 | End: 2022-05-25 | Stop reason: SDUPTHER

## 2022-05-03 NOTE — PROGRESS NOTES
Jerald Jessica  1955  [id number]      HISTORY OF PRESENT ILLNESS:  Ms. Skylar Lewis is a 73 y.o. female returns for a follow up visit for pain management  She has a diagnosis of   1. Chronic pain syndrome    2. DDD (degenerative disc disease), cervical    3. Chronic painful diabetic neuropathy ([facility] [address]    4. Primary insomnia    5. DDD (degenerative disc disease), lumbar    6. Fibromyalgia    7. Headache, chronic migraine without aura, intractable, with status    8. Moderate episode of recurrent major depressive disorder ([address]    9. Tendinitis of right rotator cuff    10. Rotator cuff tendonitis, right    11. Intractable migraine with aura with status migrainosus    . She complains of pain in the Neck, Low back, with radiaton to right shoulder, Right leg  She rates the pain 8/10 and describes it as sharp. Current treatment regimen has helped relieve about 10% of the pain. She denies any side effects from the current pain regimen. Patient reports that since the last follow up visit the physical functioning is unchanged, family/social relationships are unchanged, mood is unchanged sleep patterns are unchanged, and that the overall functioning is unchanged. Patient denies misusing/abusing her narcotic pain medications or using any illegal drugs. There are No indicators for possible drug abuse, addiction or diversion problems. Patient states she is getting a cardiac procedure in 2 weeks. She mentions she has been complaint with her regimen. She says she is using Percocet 3-4 per day along with the other adjuvants. She mentions the headache symptoms are manageable somewhat with the regimen. She reports she is managing light house chores. She says she has a aide helping her 5 days a week. ALLERGIES: Patients list of allergies were reviewed     MEDICATIONS: Ms. Skylar Lewis list of medications were reviewed. Her current medications are   Outpatient Medications Prior to Visit   Medication Sig Dispense Refill    ASPIRIN LOW DOSE 81 MG EC tablet TAKE 1 TABLET BY MOUTH DAILY 90 tablet 5    [START ON 5/9/2022] chlorhexidine (HIBICLENS) 4 % external liquid Wash from neck down the night before or morning of the procedure 1 each 0    TRUE METRIX BLOOD GLUCOSE TEST strip USE TO TEST BLOOD GLUCOSE TWICE DAILY 150 strip 11    UltiCare Alcohol Swabs 70 % PADS USE TO TEST BLOOD GLUCOSE TWICE DAILY 90 each 11    Pharmacist Choice Lancets MISC USE TO TEST BLOOD GLUCOSE TWICE DAILY 90 each 11    Continuous Blood Gluc  (FREESTYLE LISSETT 14 DAY READER) DAYO 1 each by Does not apply route 3 times daily 1 each 1    Continuous Blood Gluc Sensor (FREESTYLE LISSETT 14 DAY SENSOR) MISC 1 each by Does not apply route every 14 days 2 each 5    Continuous Blood Gluc Transmit (DEXCOM G6 TRANSMITTER) MISC 1 each by Does not apply route daily 1 each 2    Continuous Blood Gluc Sensor (DEXCOM G6 SENSOR) MISC 1 each by Does not apply route every 14 days 3 each 1    Continuous Blood Gluc  (DEXCOM G6 ) DAYO 1 each by Does not apply route daily 1 each 1    albuterol (PROVENTIL) (2.5 MG/3ML) 0.083% nebulizer solution TAKE 3 MLS BY NEBULIZATION EVERY 4 HOURS AS NEEDED FOR WHEEZING 360 mL 5    ULTICARE MINI PEN NEEDLES 31G X 6 MM MISC USE WITH INSULINS FOUR TIMES A DAY 90 each 1    metoprolol succinate (TOPROL XL) 50 MG extended release tablet Take 1 tablet by mouth nightly 90 tablet 5    insulin glargine (BASAGLAR KWIKPEN) 100 UNIT/ML injection pen Inject 8 Units into the skin 2 times daily 5 pen 3    amLODIPine (NORVASC) 5 MG tablet Take 2 tablets by mouth daily 90 tablet 5    isosorbide mononitrate (IMDUR) 60 MG extended release tablet Take 1 tablet by mouth daily 90 tablet 5    nitroGLYCERIN (NITRODUR) 0.1 MG/HR Place 1 patch onto the skin every 24 hours 30 patch 0    docusate sodium (COLACE) 100 MG capsule Take 100 mg by mouth 2 times daily      atorvastatin (LIPITOR) 80 MG tablet TAKE 1 TABLET BY MOUTH NIGHTLY 90 tablet 5    clopidogrel (PLAVIX) 75 MG tablet TAKE ONE TABLET BY MOUTH EVERY DAY 90 tablet 5    ranolazine (RANEXA) 1000 MG extended release tablet Take 1 tablet by mouth 2 times daily 60 tablet 8    linaclotide (LINZESS) 145 MCG capsule Take 1 capsule by mouth every morning (before breakfast) 30 capsule 5    omeprazole (PRILOSEC) 20 MG delayed release capsule TAKE 1 CAPSULE BY MOUTH DAILY 30 capsule 1    aspirin 81 MG chewable tablet Take 1 tablet by mouth daily 30 tablet 3    nitroGLYCERIN (NITROSTAT) 0.4 MG SL tablet Place 1 tablet under the tongue every 5 minutes as needed for Chest pain up to max of 3 total doses. If no relief after 1 dose, call 911. 25 tablet 3    albuterol sulfate HFA (VENTOLIN HFA) 108 (90 Base) MCG/ACT inhaler Inhale 2 puffs into the lungs every 4 hours as needed for Wheezing or Shortness of Breath 3 Inhaler 5    oxyCODONE-acetaminophen (PERCOCET) 7.5-325 MG per tablet Take 1 tablet by mouth every 6 hours as needed for Pain (max 3-4 per day) for up to 28 days. 100 tablet 0    DULoxetine (CYMBALTA) 60 MG extended release capsule Take 1 capsule by mouth daily 30 capsule 1    QUEtiapine (SEROQUEL) 25 MG tablet Take 1-2 tablets by mouth nightly 60 tablet 1    Ubrogepant (UBRELVY) 50 MG TABS Take 1 tablet po PRN at start of headaches, can repeat in 24 hours 16 tablet 1    tiZANidine (ZANAFLEX) 4 MG tablet Take 0.5-1 tablets by mouth 2 times daily as needed (muscle spasms) 60 tablet 1     No facility-administered medications prior to visit. REVIEW OF SYSTEMS:    Respiratory: Negative for apnea, chest tightness and shortness of breath or change in baseline breathing. PHYSICAL EXAM:   Nursing note and vitals reviewed. /74   Pulse 75   Resp 16   Ht 5' 1\" (1.549 m)   Wt 115 lb (52.2 kg)   SpO2 98%   Breastfeeding No   BMI 21.73 kg/m²   Constitutional: She appears well-developed and well-nourished. No acute distress.    Cardiovascular: Normal rate, regular rhythm, normal heart sounds, and does not have murmur. Pulmonary/Chest: Effort normal. No respiratory distress. She does not have wheezes in the lung fields. She has no rales. Neurological/Psychiatric:She is alert and oriented to person, place, and time. Coordination is  normal.  Her mood isAppropriate and affect is Neutral/Euthymic(normal) . Her    IMPRESSION:   1. Chronic pain syndrome    2. DDD (degenerative disc disease), cervical    3. Chronic painful diabetic neuropathy ([address]    4. DDD (degenerative disc disease), lumbar    5. Fibromyalgia    6. Tendinitis of right rotator cuff    7. Rotator cuff tendonitis, right        PLAN:  Informed verbal consent was obtained  -OARRS record was obtained and reviewed  for the last one year and no indicators of drug misuse  were found. Any other controlled substance prescriptions  seen on the record have been accounted for, I am aware of the patient receiving these medications. [facility] OARRS record will be rechecked as part of office protocol.    -Continue with Percocet 3-4 per day   -Interim history reviewed    -Continue with all other adjuvants   -she was advised  to avoid using too many OTC analgesics to control the headaches, avoid chocolates, increased caffeine, cheeses and MSG nitrite containing foods, cigarette smoking. To avoid bright lights, strong smells and skipping meals. Current Outpatient Medications   Medication Sig Dispense Refill    oxyCODONE-acetaminophen (PERCOCET) 7.5-325 MG per tablet Take 1 tablet by mouth every 6 hours as needed for Pain (max 3-4 per day) for up to 28 days.  100 tablet 0    DULoxetine (CYMBALTA) 60 MG extended release capsule Take 1 capsule by mouth daily 30 capsule 1    QUEtiapine (SEROQUEL) 25 MG tablet Take 1-2 tablets by mouth nightly 60 tablet 1    Ubrogepant (UBRELVY) 50 MG TABS Take 1 tablet po PRN at start of headaches, can repeat in 24 hours 16 tablet 1    tiZANidine (ZANAFLEX) 4 MG tablet Take 0.5-1 tablets by mouth 2 times daily as needed (muscle spasms) 60 tablet 1    ASPIRIN LOW DOSE 81 MG EC tablet TAKE 1 TABLET BY MOUTH DAILY 90 tablet 5    [START ON 5/9/2022] chlorhexidine (HIBICLENS) 4 % external liquid Wash from neck down the night before or morning of the procedure 1 each 0    TRUE METRIX BLOOD GLUCOSE TEST strip USE TO TEST BLOOD GLUCOSE TWICE DAILY 150 strip 11    UltiCare Alcohol Swabs 70 % PADS USE TO TEST BLOOD GLUCOSE TWICE DAILY 90 each 11    Pharmacist Choice Lancets MISC USE TO TEST BLOOD GLUCOSE TWICE DAILY 90 each 11    Continuous Blood Gluc  (FREESTYLE LISSETT 14 DAY READER) DAYO 1 each by Does not apply route 3 times daily 1 each 1    Continuous Blood Gluc Sensor (FREESTYLE LISSETT 14 DAY SENSOR) MISC 1 each by Does not apply route every 14 days 2 each 5    Continuous Blood Gluc Transmit (DEXCOM G6 TRANSMITTER) MISC 1 each by Does not apply route daily 1 each 2    Continuous Blood Gluc Sensor (DEXCOM G6 SENSOR) MISC 1 each by Does not apply route every 14 days 3 each 1    Continuous Blood Gluc  (DEXCOM G6 ) DAYO 1 each by Does not apply route daily 1 each 1    albuterol (PROVENTIL) (2.5 MG/3ML) 0.083% nebulizer solution TAKE 3 MLS BY NEBULIZATION EVERY 4 HOURS AS NEEDED FOR WHEEZING 360 mL 5    ULTICARE MINI PEN NEEDLES 31G X 6 MM MISC USE WITH INSULINS FOUR TIMES A DAY 90 each 1    metoprolol succinate (TOPROL XL) 50 MG extended release tablet Take 1 tablet by mouth nightly 90 tablet 5    insulin glargine (BASAGLAR KWIKPEN) 100 UNIT/ML injection pen Inject 8 Units into the skin 2 times daily 5 pen 3    amLODIPine (NORVASC) 5 MG tablet Take 2 tablets by mouth daily 90 tablet 5    isosorbide mononitrate (IMDUR) 60 MG extended release tablet Take 1 tablet by mouth daily 90 tablet 5    nitroGLYCERIN (NITRODUR) 0.1 MG/HR Place 1 patch onto the skin every 24 hours 30 patch 0    docusate sodium (COLACE) 100 MG capsule Take 100 mg by mouth 2 times daily      atorvastatin (LIPITOR) 80 MG tablet TAKE 1 TABLET BY MOUTH NIGHTLY 90 tablet 5    clopidogrel (PLAVIX) 75 MG tablet TAKE ONE TABLET BY MOUTH EVERY DAY 90 tablet 5    ranolazine (RANEXA) 1000 MG extended release tablet Take 1 tablet by mouth 2 times daily 60 tablet 8    linaclotide (LINZESS) 145 MCG capsule Take 1 capsule by mouth every morning (before breakfast) 30 capsule 5    omeprazole (PRILOSEC) 20 MG delayed release capsule TAKE 1 CAPSULE BY MOUTH DAILY 30 capsule 1    aspirin 81 MG chewable tablet Take 1 tablet by mouth daily 30 tablet 3    nitroGLYCERIN (NITROSTAT) 0.4 MG SL tablet Place 1 tablet under the tongue every 5 minutes as needed for Chest pain up to max of 3 total doses. If no relief after 1 dose, call 911. 25 tablet 3    albuterol sulfate HFA (VENTOLIN HFA) 108 (90 Base) MCG/ACT inhaler Inhale 2 puffs into the lungs every 4 hours as needed for Wheezing or Shortness of Breath 3 Inhaler 5     No current facility-administered medications for this visit. I will continue her current medication regimen  which is part of the above treatment schedule. It has been helping with Ms. Meza's chronic  medical problems which for this visit include:   Diagnoses of Chronic pain syndrome, DDD (degenerative disc disease), cervical, Chronic painful diabetic neuropathy ([address] Primary insomnia, DDD (degenerative disc disease), lumbar, Fibromyalgia, Headache, chronic migraine without aura, intractable, with status, Moderate episode of recurrent major depressive disorder ([address] Tendinitis of right rotator cuff, Rotator cuff tendonitis, right, and Intractable migraine with aura with status migrainosus were pertinent to this visit. Risks and benefits of the medications and other alternative treatments  including no treatment were discussed with the patient. The common side effects of these medications were also explained to the patient. Informed verbal consent was obtained.    Goals of current treatment regimen include improvement in pain, restoration of functioning- with focus on improvement in physical performance, general activity, work or disability,emotional distress, health care utilization and  decreased medication consumption. Will continue to monitor progress towards achieving/maintaining therapeutic goals with special emphasis on  1. Improvement in perceived interfernce  of pain with ADL's. Ability to do home exercises independently. Ability to do household chores indoor and/or outdoor work and social and leisure activities. Improve psychosocial and physical functioning. - she is showing progression towards this treatment goal with the current regimen. She was advised against drinking alcohol with the narcotic pain medicines, advised against driving or handling machinery while adjusting the dose of medicines or if having cognitive  issues related to the current medications. Risk of overdose and death, if medicines not taken as prescribed, were also discussed. If the patient develops new symptoms or if the symptoms worsen, the patient should call the office. While transcribing every attempt was made to maintain the accuracy of the note in terms of it's contents,there may have been some errors made inadvertently. Thank you for allowing me to participate in the care of this patient.     Juancho Delgado MD.    Cc: Tenzin Rodriguez MD

## 2022-05-04 DIAGNOSIS — J30.89 ALLERGIC RHINITIS DUE TO OTHER ALLERGIC TRIGGER, UNSPECIFIED SEASONALITY: Primary | ICD-10-CM

## 2022-05-04 RX ORDER — MONTELUKAST SODIUM 10 MG/1
10 TABLET ORAL DAILY
Qty: 30 TABLET | Refills: 1 | Status: SHIPPED | OUTPATIENT
Start: 2022-05-04 | End: 2022-06-01

## 2022-05-04 RX ORDER — FEXOFENADINE HCL 180 MG/1
180 TABLET ORAL DAILY
Qty: 30 TABLET | Refills: 1 | Status: SHIPPED | OUTPATIENT
Start: 2022-05-04 | End: 2022-07-18

## 2022-05-11 DIAGNOSIS — I48.0 PAROXYSMAL A-FIB (HCC): ICD-10-CM

## 2022-05-11 DIAGNOSIS — Z01.818 PRE-OP TESTING: ICD-10-CM

## 2022-05-11 LAB
ABO/RH: NORMAL
ANION GAP SERPL CALCULATED.3IONS-SCNC: 19 MMOL/L (ref 3–16)
ANTIBODY SCREEN: NORMAL
BACTERIA: NORMAL /HPF
BILIRUBIN URINE: NEGATIVE
BLOOD, URINE: NEGATIVE
BUN BLDV-MCNC: 28 MG/DL (ref 7–20)
CALCIUM SERPL-MCNC: 9.4 MG/DL (ref 8.3–10.6)
CHLORIDE BLD-SCNC: 106 MMOL/L (ref 99–110)
CLARITY: CLEAR
CO2: 17 MMOL/L (ref 21–32)
COLOR: YELLOW
CREAT SERPL-MCNC: 1.3 MG/DL (ref 0.6–1.2)
EPITHELIAL CELLS, UA: 2 /HPF (ref 0–5)
GFR AFRICAN AMERICAN: 50
GFR NON-AFRICAN AMERICAN: 41
GLUCOSE BLD-MCNC: 160 MG/DL (ref 70–99)
GLUCOSE URINE: NEGATIVE MG/DL
HCT VFR BLD CALC: 36.1 % (ref 36–48)
HEMOGLOBIN: 11.8 G/DL (ref 12–16)
HYALINE CASTS: 1 /LPF (ref 0–8)
KETONES, URINE: NEGATIVE MG/DL
LEUKOCYTE ESTERASE, URINE: NEGATIVE
MCH RBC QN AUTO: 31.1 PG (ref 26–34)
MCHC RBC AUTO-ENTMCNC: 32.7 G/DL (ref 31–36)
MCV RBC AUTO: 95.1 FL (ref 80–100)
MICROSCOPIC EXAMINATION: YES
NITRITE, URINE: NEGATIVE
PDW BLD-RTO: 14.7 % (ref 12.4–15.4)
PH UA: 5.5 (ref 5–8)
PLATELET # BLD: 255 K/UL (ref 135–450)
PMV BLD AUTO: 7.9 FL (ref 5–10.5)
POTASSIUM SERPL-SCNC: 4.3 MMOL/L (ref 3.5–5.1)
PROTEIN UA: 100 MG/DL
RBC # BLD: 3.8 M/UL (ref 4–5.2)
RBC UA: 1 /HPF (ref 0–4)
SODIUM BLD-SCNC: 142 MMOL/L (ref 136–145)
SPECIFIC GRAVITY UA: 1.02 (ref 1–1.03)
URINE TYPE: ABNORMAL
UROBILINOGEN, URINE: 0.2 E.U./DL
WBC # BLD: 4.4 K/UL (ref 4–11)
WBC UA: 1 /HPF (ref 0–5)

## 2022-05-11 NOTE — TELEPHONE ENCOUNTER
Medication:   Requested Prescriptions     Pending Prescriptions Disp Refills    ULTICARE SHORT PEN NEEDLES 31G X 8 MM [facility] [Pharmacy Med Name: Elaina Flow Pen Needle 31 gauge x 5/16\"]  5     Sig: USE WITH insulin pens        Last Filled:      Patient Phone Number: [phone] (home)     Last appt: 1/27/2022   Next appt: 5/25/2022    Last OARRS:   RX Monitoring 4/9/2019   Attestation The Prescription Monitoring Report for this patient was reviewed today.

## 2022-05-12 RX ORDER — METHYLPREDNISOLONE SODIUM SUCCINATE 125 MG/2ML
125 INJECTION, POWDER, LYOPHILIZED, FOR SOLUTION INTRAMUSCULAR; INTRAVENOUS ONCE
Status: DISCONTINUED | OUTPATIENT
Start: 2022-05-16 | End: 2022-05-16 | Stop reason: HOSPADM

## 2022-05-16 ENCOUNTER — ANESTHESIA EVENT (OUTPATIENT)
Dept: CARDIAC CATH/INVASIVE PROCEDURES | Age: 67
End: 2022-05-16

## 2022-05-16 ENCOUNTER — HOSPITAL ENCOUNTER (INPATIENT)
Dept: CARDIAC CATH/INVASIVE PROCEDURES | Age: 67
LOS: 1 days | Discharge: HOME | DRG: 274 | End: 2022-05-16
Attending: INTERNAL MEDICINE | Admitting: INTERNAL MEDICINE
Payer: COMMERCIAL

## 2022-05-16 ENCOUNTER — ANESTHESIA (OUTPATIENT)
Dept: CARDIAC CATH/INVASIVE PROCEDURES | Age: 67
End: 2022-05-16

## 2022-05-16 VITALS
WEIGHT: 118 LBS | BODY MASS INDEX: 23.16 KG/M2 | HEIGHT: 60 IN | RESPIRATION RATE: 14 BRPM | OXYGEN SATURATION: 100 % | HEART RATE: 63 BPM | DIASTOLIC BLOOD PRESSURE: 68 MMHG | SYSTOLIC BLOOD PRESSURE: 156 MMHG | TEMPERATURE: 97.3 F

## 2022-05-16 DIAGNOSIS — I48.0 PAROXYSMAL A-FIB (HCC): ICD-10-CM

## 2022-05-16 LAB
ABO/RH: NORMAL
ANION GAP SERPL CALCULATED.3IONS-SCNC: 13 MMOL/L (ref 3–16)
ANTIBODY SCREEN: NORMAL
BUN BLDV-MCNC: 27 MG/DL (ref 7–20)
CALCIUM SERPL-MCNC: 8.4 MG/DL (ref 8.3–10.6)
CHLORIDE BLD-SCNC: 104 MMOL/L (ref 99–110)
CO2: 20 MMOL/L (ref 21–32)
CREAT SERPL-MCNC: 1.2 MG/DL (ref 0.6–1.2)
EKG ATRIAL RATE: 75 BPM
EKG DIAGNOSIS: NORMAL
EKG P AXIS: -20 DEGREES
EKG P-R INTERVAL: 96 MS
EKG Q-T INTERVAL: 396 MS
EKG QRS DURATION: 80 MS
EKG QTC CALCULATION (BAZETT): 442 MS
EKG R AXIS: 46 DEGREES
EKG T AXIS: 80 DEGREES
EKG VENTRICULAR RATE: 75 BPM
GFR AFRICAN AMERICAN: 54
GFR NON-AFRICAN AMERICAN: 45
GLUCOSE BLD-MCNC: 124 MG/DL (ref 70–99)
GLUCOSE BLD-MCNC: 133 MG/DL (ref 70–99)
HCT VFR BLD CALC: 31.2 % (ref 36–48)
HEMOGLOBIN: 10.4 G/DL (ref 12–16)
MCH RBC QN AUTO: 31.4 PG (ref 26–34)
MCHC RBC AUTO-ENTMCNC: 33.2 G/DL (ref 31–36)
MCV RBC AUTO: 94.6 FL (ref 80–100)
PDW BLD-RTO: 14.3 % (ref 12.4–15.4)
PERFORMED ON: ABNORMAL
PLATELET # BLD: 201 K/UL (ref 135–450)
PMV BLD AUTO: 7.4 FL (ref 5–10.5)
POC ACT LR: 181 SEC
POC ACT LR: 296 SEC
POTASSIUM SERPL-SCNC: 4 MMOL/L (ref 3.5–5.1)
RBC # BLD: 3.3 M/UL (ref 4–5.2)
SODIUM BLD-SCNC: 137 MMOL/L (ref 136–145)
WBC # BLD: 6.4 K/UL (ref 4–11)

## 2022-05-16 PROCEDURE — C1769 GUIDE WIRE: HCPCS

## 2022-05-16 PROCEDURE — 2580000003 HC RX 258

## 2022-05-16 PROCEDURE — 2780000010 HC IMPLANT OTHER

## 2022-05-16 PROCEDURE — 86850 RBC ANTIBODY SCREEN: CPT

## 2022-05-16 PROCEDURE — A4216 STERILE WATER/SALINE, 10 ML: HCPCS | Performed by: NURSE ANESTHETIST, CERTIFIED REGISTERED

## 2022-05-16 PROCEDURE — 6360000002 HC RX W HCPCS

## 2022-05-16 PROCEDURE — 2500000003 HC RX 250 WO HCPCS

## 2022-05-16 PROCEDURE — 02L73DK OCCLUSION OF LEFT ATRIAL APPENDAGE WITH INTRALUMINAL DEVICE, PERCUTANEOUS APPROACH: ICD-10-PCS | Performed by: INTERNAL MEDICINE

## 2022-05-16 PROCEDURE — 85027 COMPLETE CBC AUTOMATED: CPT

## 2022-05-16 PROCEDURE — 85347 COAGULATION TIME ACTIVATED: CPT

## 2022-05-16 PROCEDURE — 2709999900 HC NON-CHARGEABLE SUPPLY

## 2022-05-16 PROCEDURE — B24BZZ4 ULTRASONOGRAPHY OF HEART WITH AORTA, TRANSESOPHAGEAL: ICD-10-PCS | Performed by: INTERNAL MEDICINE

## 2022-05-16 PROCEDURE — 6360000002 HC RX W HCPCS: Performed by: NURSE ANESTHETIST, CERTIFIED REGISTERED

## 2022-05-16 PROCEDURE — 93355 ECHO TRANSESOPHAGEAL (TEE): CPT

## 2022-05-16 PROCEDURE — 93005 ELECTROCARDIOGRAM TRACING: CPT | Performed by: INTERNAL MEDICINE

## 2022-05-16 PROCEDURE — C1894 INTRO/SHEATH, NON-LASER: HCPCS

## 2022-05-16 PROCEDURE — 86900 BLOOD TYPING SEROLOGIC ABO: CPT

## 2022-05-16 PROCEDURE — 3700000000 HC ANESTHESIA ATTENDED CARE

## 2022-05-16 PROCEDURE — 80048 BASIC METABOLIC PNL TOTAL CA: CPT

## 2022-05-16 PROCEDURE — 93010 ELECTROCARDIOGRAM REPORT: CPT | Performed by: INTERNAL MEDICINE

## 2022-05-16 PROCEDURE — 2500000003 HC RX 250 WO HCPCS: Performed by: NURSE ANESTHETIST, CERTIFIED REGISTERED

## 2022-05-16 PROCEDURE — C1893 INTRO/SHEATH, FIXED,NON-PEEL: HCPCS

## 2022-05-16 PROCEDURE — 7100000001 HC PACU RECOVERY - ADDTL 15 MIN

## 2022-05-16 PROCEDURE — 33340 PERQ CLSR TCAT L ATR APNDGE: CPT

## 2022-05-16 PROCEDURE — 2000000000 HC ICU R&B

## 2022-05-16 PROCEDURE — 2580000003 HC RX 258: Performed by: NURSE ANESTHETIST, CERTIFIED REGISTERED

## 2022-05-16 PROCEDURE — 33340 PERQ CLSR TCAT L ATR APNDGE: CPT | Performed by: INTERNAL MEDICINE

## 2022-05-16 PROCEDURE — 6360000004 HC RX CONTRAST MEDICATION: Performed by: INTERNAL MEDICINE

## 2022-05-16 PROCEDURE — 7100000000 HC PACU RECOVERY - FIRST 15 MIN

## 2022-05-16 PROCEDURE — 2720000010 HC SURG SUPPLY STERILE

## 2022-05-16 PROCEDURE — 3700000001 HC ADD 15 MINUTES (ANESTHESIA)

## 2022-05-16 PROCEDURE — 86901 BLOOD TYPING SEROLOGIC RH(D): CPT

## 2022-05-16 PROCEDURE — A4216 STERILE WATER/SALINE, 10 ML: HCPCS

## 2022-05-16 RX ORDER — MONTELUKAST SODIUM 10 MG/1
10 TABLET ORAL DAILY
Status: DISCONTINUED | OUTPATIENT
Start: 2022-05-16 | End: 2022-05-16 | Stop reason: HOSPADM

## 2022-05-16 RX ORDER — ONDANSETRON 2 MG/ML
INJECTION INTRAMUSCULAR; INTRAVENOUS PRN
Status: DISCONTINUED | OUTPATIENT
Start: 2022-05-16 | End: 2022-05-16 | Stop reason: SDUPTHER

## 2022-05-16 RX ORDER — SODIUM CHLORIDE 0.9 % (FLUSH) 0.9 %
5-40 SYRINGE (ML) INJECTION EVERY 12 HOURS SCHEDULED
Status: DISCONTINUED | OUTPATIENT
Start: 2022-05-16 | End: 2022-05-16 | Stop reason: HOSPADM

## 2022-05-16 RX ORDER — LIDOCAINE HYDROCHLORIDE 20 MG/ML
INJECTION, SOLUTION EPIDURAL; INFILTRATION; INTRACAUDAL; PERINEURAL PRN
Status: DISCONTINUED | OUTPATIENT
Start: 2022-05-16 | End: 2022-05-16 | Stop reason: SDUPTHER

## 2022-05-16 RX ORDER — ONDANSETRON 2 MG/ML
4 INJECTION INTRAMUSCULAR; INTRAVENOUS
Status: DISCONTINUED | OUTPATIENT
Start: 2022-05-16 | End: 2022-05-16 | Stop reason: HOSPADM

## 2022-05-16 RX ORDER — SODIUM CHLORIDE 9 MG/ML
INJECTION, SOLUTION INTRAVENOUS PRN
Status: DISCONTINUED | OUTPATIENT
Start: 2022-05-16 | End: 2022-05-16 | Stop reason: HOSPADM

## 2022-05-16 RX ORDER — NITROGLYCERIN 2.5 MG/D
1 PATCH TRANSDERMAL EVERY 24 HOURS
Status: DISCONTINUED | OUTPATIENT
Start: 2022-05-16 | End: 2022-05-16 | Stop reason: HOSPADM

## 2022-05-16 RX ORDER — PROPOFOL 10 MG/ML
INJECTION, EMULSION INTRAVENOUS PRN
Status: DISCONTINUED | OUTPATIENT
Start: 2022-05-16 | End: 2022-05-16 | Stop reason: SDUPTHER

## 2022-05-16 RX ORDER — SODIUM CHLORIDE 0.9 % (FLUSH) 0.9 %
5-40 SYRINGE (ML) INJECTION PRN
Status: DISCONTINUED | OUTPATIENT
Start: 2022-05-16 | End: 2022-05-16 | Stop reason: HOSPADM

## 2022-05-16 RX ORDER — METOPROLOL TARTRATE 5 MG/5ML
INJECTION INTRAVENOUS PRN
Status: DISCONTINUED | OUTPATIENT
Start: 2022-05-16 | End: 2022-05-16 | Stop reason: SDUPTHER

## 2022-05-16 RX ORDER — ACETAMINOPHEN 325 MG/1
650 TABLET ORAL EVERY 4 HOURS PRN
Status: DISCONTINUED | OUTPATIENT
Start: 2022-05-16 | End: 2022-05-16 | Stop reason: HOSPADM

## 2022-05-16 RX ORDER — HEPARIN SODIUM 1000 [USP'U]/ML
INJECTION, SOLUTION INTRAVENOUS; SUBCUTANEOUS PRN
Status: DISCONTINUED | OUTPATIENT
Start: 2022-05-16 | End: 2022-05-16 | Stop reason: SDUPTHER

## 2022-05-16 RX ORDER — DIPHENHYDRAMINE HYDROCHLORIDE 50 MG/ML
25 INJECTION INTRAMUSCULAR; INTRAVENOUS ONCE
Status: DISCONTINUED | OUTPATIENT
Start: 2022-05-16 | End: 2022-05-16 | Stop reason: HOSPADM

## 2022-05-16 RX ORDER — CLOPIDOGREL BISULFATE 75 MG/1
1 TABLET ORAL DAILY
Status: DISCONTINUED | OUTPATIENT
Start: 2022-05-16 | End: 2022-05-16 | Stop reason: HOSPADM

## 2022-05-16 RX ORDER — ATORVASTATIN CALCIUM 80 MG/1
80 TABLET, FILM COATED ORAL NIGHTLY
Status: DISCONTINUED | OUTPATIENT
Start: 2022-05-16 | End: 2022-05-16 | Stop reason: HOSPADM

## 2022-05-16 RX ORDER — PROTAMINE SULFATE 10 MG/ML
INJECTION, SOLUTION INTRAVENOUS PRN
Status: DISCONTINUED | OUTPATIENT
Start: 2022-05-16 | End: 2022-05-16 | Stop reason: SDUPTHER

## 2022-05-16 RX ORDER — NITROGLYCERIN 0.4 MG/1
0.4 TABLET SUBLINGUAL EVERY 5 MIN PRN
Status: DISCONTINUED | OUTPATIENT
Start: 2022-05-16 | End: 2022-05-16 | Stop reason: HOSPADM

## 2022-05-16 RX ORDER — SODIUM CHLORIDE 9 MG/ML
INJECTION, SOLUTION INTRAVENOUS CONTINUOUS PRN
Status: DISCONTINUED | OUTPATIENT
Start: 2022-05-16 | End: 2022-05-16 | Stop reason: SDUPTHER

## 2022-05-16 RX ORDER — VECURONIUM BROMIDE 1 MG/ML
INJECTION, POWDER, LYOPHILIZED, FOR SOLUTION INTRAVENOUS PRN
Status: DISCONTINUED | OUTPATIENT
Start: 2022-05-16 | End: 2022-05-16 | Stop reason: SDUPTHER

## 2022-05-16 RX ORDER — FENTANYL CITRATE 50 UG/ML
INJECTION, SOLUTION INTRAMUSCULAR; INTRAVENOUS PRN
Status: DISCONTINUED | OUTPATIENT
Start: 2022-05-16 | End: 2022-05-16 | Stop reason: SDUPTHER

## 2022-05-16 RX ORDER — DOCUSATE SODIUM 100 MG/1
100 CAPSULE, LIQUID FILLED ORAL 2 TIMES DAILY
Status: DISCONTINUED | OUTPATIENT
Start: 2022-05-16 | End: 2022-05-16 | Stop reason: HOSPADM

## 2022-05-16 RX ORDER — FENTANYL CITRATE 50 UG/ML
50 INJECTION, SOLUTION INTRAMUSCULAR; INTRAVENOUS EVERY 5 MIN PRN
Status: DISCONTINUED | OUTPATIENT
Start: 2022-05-16 | End: 2022-05-16 | Stop reason: HOSPADM

## 2022-05-16 RX ORDER — METOPROLOL SUCCINATE 50 MG/1
50 TABLET, EXTENDED RELEASE ORAL NIGHTLY
Status: DISCONTINUED | OUTPATIENT
Start: 2022-05-16 | End: 2022-05-16 | Stop reason: HOSPADM

## 2022-05-16 RX ORDER — AMLODIPINE BESYLATE 10 MG/1
10 TABLET ORAL DAILY
Status: DISCONTINUED | OUTPATIENT
Start: 2022-05-16 | End: 2022-05-16 | Stop reason: HOSPADM

## 2022-05-16 RX ORDER — DULOXETIN HYDROCHLORIDE 60 MG/1
60 CAPSULE, DELAYED RELEASE ORAL DAILY
Status: DISCONTINUED | OUTPATIENT
Start: 2022-05-16 | End: 2022-05-16 | Stop reason: HOSPADM

## 2022-05-16 RX ORDER — SODIUM CHLORIDE 9 MG/ML
INJECTION, SOLUTION INTRAVENOUS CONTINUOUS
Status: DISCONTINUED | OUTPATIENT
Start: 2022-05-16 | End: 2022-05-16 | Stop reason: HOSPADM

## 2022-05-16 RX ORDER — MIDAZOLAM HYDROCHLORIDE 1 MG/ML
INJECTION INTRAMUSCULAR; INTRAVENOUS PRN
Status: DISCONTINUED | OUTPATIENT
Start: 2022-05-16 | End: 2022-05-16 | Stop reason: SDUPTHER

## 2022-05-16 RX ORDER — SODIUM CHLORIDE 9 MG/ML
INJECTION INTRAVENOUS PRN
Status: DISCONTINUED | OUTPATIENT
Start: 2022-05-16 | End: 2022-05-16 | Stop reason: SDUPTHER

## 2022-05-16 RX ORDER — OXYCODONE AND ACETAMINOPHEN 7.5; 325 MG/1; MG/1
1 TABLET ORAL EVERY 6 HOURS PRN
Status: DISCONTINUED | OUTPATIENT
Start: 2022-05-16 | End: 2022-05-16 | Stop reason: HOSPADM

## 2022-05-16 RX ORDER — RANOLAZINE 500 MG/1
1000 TABLET, EXTENDED RELEASE ORAL 2 TIMES DAILY
Status: DISCONTINUED | OUTPATIENT
Start: 2022-05-16 | End: 2022-05-16 | Stop reason: HOSPADM

## 2022-05-16 RX ORDER — GLYCOPYRROLATE 0.2 MG/ML
INJECTION INTRAMUSCULAR; INTRAVENOUS PRN
Status: DISCONTINUED | OUTPATIENT
Start: 2022-05-16 | End: 2022-05-16 | Stop reason: SDUPTHER

## 2022-05-16 RX ORDER — DEXAMETHASONE SODIUM PHOSPHATE 4 MG/ML
INJECTION, SOLUTION INTRA-ARTICULAR; INTRALESIONAL; INTRAMUSCULAR; INTRAVENOUS; SOFT TISSUE PRN
Status: DISCONTINUED | OUTPATIENT
Start: 2022-05-16 | End: 2022-05-16 | Stop reason: SDUPTHER

## 2022-05-16 RX ORDER — FENTANYL CITRATE 50 UG/ML
25 INJECTION, SOLUTION INTRAMUSCULAR; INTRAVENOUS EVERY 5 MIN PRN
Status: DISCONTINUED | OUTPATIENT
Start: 2022-05-16 | End: 2022-05-16 | Stop reason: HOSPADM

## 2022-05-16 RX ORDER — MEPERIDINE HYDROCHLORIDE 25 MG/ML
12.5 INJECTION INTRAMUSCULAR; INTRAVENOUS; SUBCUTANEOUS
Status: DISCONTINUED | OUTPATIENT
Start: 2022-05-16 | End: 2022-05-16 | Stop reason: HOSPADM

## 2022-05-16 RX ORDER — ASPIRIN 81 MG/1
81 TABLET, CHEWABLE ORAL DAILY
Status: DISCONTINUED | OUTPATIENT
Start: 2022-05-16 | End: 2022-05-16 | Stop reason: HOSPADM

## 2022-05-16 RX ADMIN — METOPROLOL TARTRATE 2 MG: 1 INJECTION, SOLUTION INTRAVENOUS at 08:16

## 2022-05-16 RX ADMIN — DEXAMETHASONE SODIUM PHOSPHATE 8 MG: 4 INJECTION, SOLUTION INTRAMUSCULAR; INTRAVENOUS at 08:08

## 2022-05-16 RX ADMIN — PROPOFOL 50 MG: 10 INJECTION, EMULSION INTRAVENOUS at 08:05

## 2022-05-16 RX ADMIN — FENTANYL CITRATE 100 MCG: 50 INJECTION INTRAMUSCULAR; INTRAVENOUS at 08:02

## 2022-05-16 RX ADMIN — VECURONIUM BROMIDE 5 MG: 1 INJECTION, POWDER, LYOPHILIZED, FOR SOLUTION INTRAVENOUS at 08:03

## 2022-05-16 RX ADMIN — LIDOCAINE HYDROCHLORIDE 80 MG: 20 INJECTION, SOLUTION EPIDURAL; INFILTRATION; INTRACAUDAL; PERINEURAL at 08:04

## 2022-05-16 RX ADMIN — IOPAMIDOL 120 ML: 755 INJECTION, SOLUTION INTRAVENOUS at 10:01

## 2022-05-16 RX ADMIN — SODIUM CHLORIDE: 9 INJECTION, SOLUTION INTRAVENOUS at 08:00

## 2022-05-16 RX ADMIN — PROPOFOL 100 MG: 10 INJECTION, EMULSION INTRAVENOUS at 08:04

## 2022-05-16 RX ADMIN — GLYCOPYRROLATE 0.1 MG: 0.2 INJECTION, SOLUTION INTRAMUSCULAR; INTRAVENOUS at 08:08

## 2022-05-16 RX ADMIN — PROTAMINE SULFATE 30 MG: 10 INJECTION, SOLUTION INTRAVENOUS at 08:57

## 2022-05-16 RX ADMIN — SODIUM CHLORIDE 5 ML: 9 INJECTION INTRAMUSCULAR; INTRAVENOUS; SUBCUTANEOUS at 08:03

## 2022-05-16 RX ADMIN — PROTAMINE SULFATE 1 MG: 10 INJECTION, SOLUTION INTRAVENOUS at 08:53

## 2022-05-16 RX ADMIN — HEPARIN SODIUM 3000 UNITS: 1000 INJECTION INTRAVENOUS; SUBCUTANEOUS at 08:34

## 2022-05-16 RX ADMIN — METOPROLOL TARTRATE 2 MG: 1 INJECTION, SOLUTION INTRAVENOUS at 08:08

## 2022-05-16 RX ADMIN — ONDANSETRON 4 MG: 2 INJECTION INTRAMUSCULAR; INTRAVENOUS at 08:08

## 2022-05-16 RX ADMIN — MIDAZOLAM 2 MG: 1 INJECTION INTRAMUSCULAR; INTRAVENOUS at 08:00

## 2022-05-16 RX ADMIN — HEPARIN SODIUM 3000 UNITS: 1000 INJECTION INTRAVENOUS; SUBCUTANEOUS at 08:31

## 2022-05-16 RX ADMIN — SUGAMMADEX 150 MG: 100 INJECTION, SOLUTION INTRAVENOUS at 08:53

## 2022-05-16 ASSESSMENT — LIFESTYLE VARIABLES: SMOKING_STATUS: 0

## 2022-05-16 ASSESSMENT — COPD QUESTIONNAIRES: CAT_SEVERITY: MODERATE

## 2022-05-16 ASSESSMENT — ENCOUNTER SYMPTOMS: SHORTNESS OF BREATH: 1

## 2022-05-16 NOTE — ANESTHESIA POSTPROCEDURE EVALUATION
Department of Anesthesiology  Postprocedure Note    Patient: Kathe Bacon  MRN: [id number]  YOB: 1955  Date of evaluation: 5/16/2022  Time:  10:39 AM     Procedure Summary     Date: 05/16/22 Room / Location: [facility] Cath Lab    Anesthesia Start: 0800 Anesthesia Stop: 1666    Procedure: WATCHMAN Diagnosis: Paroxysmal A-fib ([address]    Scheduled Providers:  Responsible Provider: Beny Mace MD    Anesthesia Type: General ASA Status: 4          Anesthesia Type: General    Tessa Phase I:      Tessa Phase II:      Last vitals: Reviewed and per EMR flowsheets.        Anesthesia Post Evaluation    Patient location during evaluation: PACU  Patient participation: complete - patient participated  Level of consciousness: awake and alert  Pain score: 1  Airway patency: patent  Nausea & Vomiting: no nausea and no vomiting  Complications: no  Cardiovascular status: blood pressure returned to baseline  Respiratory status: acceptable  Hydration status: euvolemic

## 2022-05-16 NOTE — ANESTHESIA PRE PROCEDURE
[facility] Department of Anesthesiology  Pre-Anesthesia Evaluation/Consultation       Name:  Shahana Negro  : 1955  Age:  73 y.o. MRN:  [id number]  Date: 2022           Surgeon: * No surgeons listed *    Procedure: Procedure(s):   Watchman     No Known Allergies  Patient Active Problem List   Diagnosis    Hyperlipidemia    PVC (premature ventricular contraction)    Depression    PTSD (post-traumatic stress disorder)    Insomnia    Snoring    Port-A-Cath in place    Inferior vena cava occlusion (HCC)    Tobacco use    Chronic diastolic CHF (congestive heart failure), NYHA class 2 [facility]    COPD (chronic obstructive pulmonary disease) ([facility]    NSTEMI (non-ST elevated myocardial infarction) [facility]    Rotator cuff tendonitis    Essential hypertension    Fibromyalgia    Chronic pain syndrome    Type 2 diabetes mellitus with diabetic chronic kidney disease [facility]    S/P right coronary artery (RCA) stent placement    Irritable bowel syndrome    Giant cell arteritis (HCC)    Gastro-esophageal reflux disease without esophagitis    A-fib (HCC)    Coronary artery disease involving native coronary artery of native heart with angina pectoris (HCC)    Chronic painful diabetic neuropathy (HCC)    Reactive airway disease with wheezing with acute exacerbation    Acute-on-chronic renal failure (HCC)    Age-related nuclear cataract, bilateral    Chronic prescription opiate use    Coronary stent restenosis due to progression of disease    Diabetic retinopathy of both eyes without macular edema associated with type 2 diabetes mellitus ([facility]    Hypertensive heart and chronic kidney disease with heart failure and stage 1 through stage 4 chronic kidney disease, or unspecified chronic kidney disease (HCC)    Ischemic cardiomyopathy    Moderate episode of recurrent major depressive disorder (HCC)    Regular astigmatism, bilateral    CKD (chronic kidney disease) stage 3, GFR 30-59 ml/min (HCC)    Thrombosis of superior vena cava, chronic (HCC)    Type 2 diabetes mellitus with mild nonproliferative diabetic retinopathy without macular edema, bilateral (HCC)    Uncomplicated opioid dependence ([address]    KOLBY (acute kidney injury) ([address]    Moderate malnutrition ([address]     Past Medical History:   Diagnosis Date    Acid reflux     Anemia     Anxiety and depression     Arthritis     Asthma     Atrial fibrillation (HCC)     CAD (coronary artery disease) 12/3/2012    Cerebral artery occlusion with cerebral infarction [facility])     TIA\"\"S--right sided weakness & headache    CHF (congestive heart failure) (HCC)     Chronic kidney disease--stage III     40% kidney function    COPD (chronic obstructive pulmonary disease) ([address]     DM2 (diabetes mellitus, type 2) ([address]     Dysarthria     Fibromyalgia 6/7/2016    Headache(784.0) 2/19/2013    Hemisensory loss     History of blood transfusion 11/2020    pt denies having transfusion reaction    Hyperlipidemia     Hypertension     IBS (irritable bowel syndrome)     Inferior vena cava occlusion (HCC)     Keratitis     MI, old     Neuropathy     Superior vena cava obstruction     Temporal arteritis ([address] 2/24/2014    Wears glasses      Past Surgical History:   Procedure Laterality Date    ABLATION OF DYSRHYTHMIC FOCUS  1999  and 11/2020    times 2    ARTERY BIOPSY Right 04/23/2014    RIGHT TEMPORAL ARTERY BIOPSY    CAROTID ARTERY SURGERY Left     clean up per pt    CATARACT REMOVAL Bilateral     CHOLECYSTECTOMY      COLONOSCOPY N/A 4/9/2021    COLONOSCOPY WITH BIOPSY performed by Merline Rutledge MD at [address] N/A 10/15/2021    COLONOSCOPY performed by Merline Rutledge MD at [facility] 19 2020    HYSTERECTOMY      JOINT REPLACEMENT Right     KNEE ARTHROSCOPY Right     PTCA  10/2019    LAD and RCA inrtervention    TUNNELED VENOUS PORT PLACEMENT      left thigh. SMART PORT-----Removed--total of 4 port placement and removal    UPPER GASTROINTESTINAL ENDOSCOPY N/A 7/6/2020    EGD DIAGNOSTIC ONLY performed by Roxana Henson MD at [facility] History     Tobacco Use    Smoking status: Former Smoker     Packs/day: 0.50     Years: 35.00     Pack years: 17.50     Types: Cigarettes     Quit date: 7/1/2018     Years since quitting: 3.8    Smokeless tobacco: Never Used    Tobacco comment: 5/13/15 has not smoked since hospitalization - kh   Vaping Use    Vaping Use: Never used   Substance Use Topics    Alcohol use: No     Alcohol/week: 0.0 standard drinks    Drug use: Never     Medications  Current Outpatient Medications on File Prior to Visit   Medication Sig Dispense Refill    ULTICARE SHORT PEN NEEDLES 31G X 8 MM MISC USE WITH insulin pens 90 each 5    fexofenadine (ALLEGRA) 180 MG tablet Take 1 tablet by mouth daily 30 tablet 1    montelukast (SINGULAIR) 10 MG tablet Take 1 tablet by mouth daily 30 tablet 1    oxyCODONE-acetaminophen (PERCOCET) 7.5-325 MG per tablet Take 1 tablet by mouth every 6 hours as needed for Pain (max 3-4 per day) for up to 28 days.  100 tablet 0    DULoxetine (CYMBALTA) 60 MG extended release capsule Take 1 capsule by mouth daily 30 capsule 1    QUEtiapine (SEROQUEL) 25 MG tablet Take 1-2 tablets by mouth nightly 60 tablet 1    Ubrogepant (UBRELVY) 50 MG TABS Take 1 tablet po PRN at start of headaches, can repeat in 24 hours 16 tablet 1    tiZANidine (ZANAFLEX) 4 MG tablet Take 0.5-1 tablets by mouth 2 times daily as needed (muscle spasms) 60 tablet 1    ASPIRIN LOW DOSE 81 MG EC tablet TAKE 1 TABLET BY MOUTH DAILY 90 tablet 5    chlorhexidine (HIBICLENS) 4 % external liquid Wash from neck down the night before or morning of the procedure 1 each 0    TRUE METRIX BLOOD GLUCOSE TEST strip USE TO TEST BLOOD GLUCOSE TWICE DAILY 150 strip 11    UltiCare Alcohol Swabs 70 % PADS USE TO TEST BLOOD GLUCOSE TWICE DAILY 90 each 11    Pharmacist Choice Lancets MISC USE TO TEST BLOOD GLUCOSE TWICE DAILY 90 each 11    Continuous Blood Gluc  (FREESTYLE LISSETT 14 DAY READER) DAYO 1 each by Does not apply route 3 times daily 1 each 1    Continuous Blood Gluc Sensor (FREESTYLE LISSETT 14 DAY SENSOR) MISC 1 each by Does not apply route every 14 days 2 each 5    Continuous Blood Gluc Transmit (DEXCOM G6 TRANSMITTER) MISC 1 each by Does not apply route daily 1 each 2    Continuous Blood Gluc Sensor (DEXCOM G6 SENSOR) MISC 1 each by Does not apply route every 14 days 3 each 1    Continuous Blood Gluc  (DEXCOM G6 ) DAYO 1 each by Does not apply route daily 1 each 1    albuterol (PROVENTIL) (2.5 MG/3ML) 0.083% nebulizer solution TAKE 3 MLS BY NEBULIZATION EVERY 4 HOURS AS NEEDED FOR WHEEZING 360 mL 5    ULTICARE MINI PEN NEEDLES 31G X 6 MM MISC USE WITH INSULINS FOUR TIMES A DAY 90 each 1    metoprolol succinate (TOPROL XL) 50 MG extended release tablet Take 1 tablet by mouth nightly 90 tablet 5    insulin glargine (BASAGLAR KWIKPEN) 100 UNIT/ML injection pen Inject 8 Units into the skin 2 times daily 5 pen 3    amLODIPine (NORVASC) 5 MG tablet Take 2 tablets by mouth daily 90 tablet 5    isosorbide mononitrate (IMDUR) 60 MG extended release tablet Take 1 tablet by mouth daily 90 tablet 5    nitroGLYCERIN (NITRODUR) 0.1 MG/HR Place 1 patch onto the skin every 24 hours 30 patch 0    docusate sodium (COLACE) 100 MG capsule Take 100 mg by mouth 2 times daily      atorvastatin (LIPITOR) 80 MG tablet TAKE 1 TABLET BY MOUTH NIGHTLY 90 tablet 5    clopidogrel (PLAVIX) 75 MG tablet TAKE ONE TABLET BY MOUTH EVERY DAY 90 tablet 5    ranolazine (RANEXA) 1000 MG extended release tablet Take 1 tablet by mouth 2 times daily 60 tablet 8    linaclotide (LINZESS) 145 MCG capsule Take 1 capsule by mouth every morning (before breakfast) 30 capsule 5    omeprazole (PRILOSEC) 20 MG delayed release capsule TAKE 1 CAPSULE BY MOUTH DAILY 30 capsule 1    aspirin 81 MG chewable tablet Take 1 tablet by mouth daily 30 tablet 3    nitroGLYCERIN (NITROSTAT) 0.4 MG SL tablet Place 1 tablet under the tongue every 5 minutes as needed for Chest pain up to max of 3 total doses. If no relief after 1 dose, call 911. 25 tablet 3    albuterol sulfate HFA (VENTOLIN HFA) 108 (90 Base) MCG/ACT inhaler Inhale 2 puffs into the lungs every 4 hours as needed for Wheezing or Shortness of Breath 3 Inhaler 5     Current Facility-Administered Medications on File Prior to Visit   Medication Dose Route Frequency Provider Last Rate Last Admin    0.9 % sodium chloride infusion   IntraVENous Continuous Germaine Crawford MD        ceFAZolin (ANCEF) 2000 mg in dextrose 5 % 100 mL IVPB  2,000 mg IntraVENous On Call to [address] [place] [address] Street, MD        diphenhydrAMINE (BENADRYL) injection 25 mg  25 mg IntraVENous Once Germaine Crawford MD        methylPREDNISolone sodium (SOLU-MEDROL) injection 125 mg  125 mg IntraVENous Once Germaine Crawford MD         Current Outpatient Medications   Medication Sig Dispense Refill    ULTICARE SHORT PEN NEEDLES 31G X 8 MM MISC USE WITH insulin pens 90 each 5    fexofenadine (ALLEGRA) 180 MG tablet Take 1 tablet by mouth daily 30 tablet 1    montelukast (SINGULAIR) 10 MG tablet Take 1 tablet by mouth daily 30 tablet 1    oxyCODONE-acetaminophen (PERCOCET) 7.5-325 MG per tablet Take 1 tablet by mouth every 6 hours as needed for Pain (max 3-4 per day) for up to 28 days.  100 tablet 0    DULoxetine (CYMBALTA) 60 MG extended release capsule Take 1 capsule by mouth daily 30 capsule 1    QUEtiapine (SEROQUEL) 25 MG tablet Take 1-2 tablets by mouth nightly 60 tablet 1    Ubrogepant (UBRELVY) 50 MG TABS Take 1 tablet po PRN at start of headaches, can repeat in 24 hours 16 tablet 1    tiZANidine (ZANAFLEX) 4 MG tablet Take 0.5-1 tablets by mouth 2 times daily as needed (muscle spasms) 60 tablet 1    ASPIRIN LOW DOSE 81 MG EC tablet TAKE 1 TABLET BY MOUTH DAILY 90 tablet 5    chlorhexidine (HIBICLENS) 4 % external liquid Wash from neck down the night before or morning of the procedure 1 each 0    TRUE METRIX BLOOD GLUCOSE TEST strip USE TO TEST BLOOD GLUCOSE TWICE DAILY 150 strip 11    UltiCare Alcohol Swabs 70 % PADS USE TO TEST BLOOD GLUCOSE TWICE DAILY 90 each 11    Pharmacist Choice Lancets MISC USE TO TEST BLOOD GLUCOSE TWICE DAILY 90 each 11    Continuous Blood Gluc  (FREESTYLE LISSETT 14 DAY READER) DAYO 1 each by Does not apply route 3 times daily 1 each 1    Continuous Blood Gluc Sensor (FREESTYLE LISSETT 14 DAY SENSOR) MISC 1 each by Does not apply route every 14 days 2 each 5    Continuous Blood Gluc Transmit (DEXCOM G6 TRANSMITTER) MISC 1 each by Does not apply route daily 1 each 2    Continuous Blood Gluc Sensor (DEXCOM G6 SENSOR) MISC 1 each by Does not apply route every 14 days 3 each 1    Continuous Blood Gluc  (DEXCOM G6 ) DAYO 1 each by Does not apply route daily 1 each 1    albuterol (PROVENTIL) (2.5 MG/3ML) 0.083% nebulizer solution TAKE 3 MLS BY NEBULIZATION EVERY 4 HOURS AS NEEDED FOR WHEEZING 360 mL 5    ULTICARE MINI PEN NEEDLES 31G X 6 MM MISC USE WITH INSULINS FOUR TIMES A DAY 90 each 1    metoprolol succinate (TOPROL XL) 50 MG extended release tablet Take 1 tablet by mouth nightly 90 tablet 5    insulin glargine (BASAGLAR KWIKPEN) 100 UNIT/ML injection pen Inject 8 Units into the skin 2 times daily 5 pen 3    amLODIPine (NORVASC) 5 MG tablet Take 2 tablets by mouth daily 90 tablet 5    isosorbide mononitrate (IMDUR) 60 MG extended release tablet Take 1 tablet by mouth daily 90 tablet 5    nitroGLYCERIN (NITRODUR) 0.1 MG/HR Place 1 patch onto the skin every 24 hours 30 patch 0    docusate sodium (COLACE) 100 MG capsule Take 100 mg by mouth 2 times daily      atorvastatin (LIPITOR) 80 MG tablet TAKE 1 TABLET BY MOUTH NIGHTLY 90 tablet 5    clopidogrel (PLAVIX) 75 MG tablet TAKE ONE TABLET BY MOUTH EVERY DAY 90 tablet 5    ranolazine (RANEXA) 1000 MG extended release tablet Take 1 tablet by mouth 2 times daily 60 tablet 8    linaclotide (LINZESS) 145 MCG capsule Take 1 capsule by mouth every morning (before breakfast) 30 capsule 5    omeprazole (PRILOSEC) 20 MG delayed release capsule TAKE 1 CAPSULE BY MOUTH DAILY 30 capsule 1    aspirin 81 MG chewable tablet Take 1 tablet by mouth daily 30 tablet 3    nitroGLYCERIN (NITROSTAT) 0.4 MG SL tablet Place 1 tablet under the tongue every 5 minutes as needed for Chest pain up to max of 3 total doses. If no relief after 1 dose, call 911. 25 tablet 3    albuterol sulfate HFA (VENTOLIN HFA) 108 (90 Base) MCG/ACT inhaler Inhale 2 puffs into the lungs every 4 hours as needed for Wheezing or Shortness of Breath 3 Inhaler 5     No current facility-administered medications for this visit. Facility-Administered Medications Ordered in Other Visits   Medication Dose Route Frequency Provider Last Rate Last Admin    0.9 % sodium chloride infusion   IntraVENous Continuous Kevin Pratt MD        ceFAZolin (ANCEF) 2000 mg in dextrose 5 % 100 mL IVPB  2,000 mg IntraVENous On Call to [address] [place] [address], MD        diphenhydrAMINE (BENADRYL) injection 25 mg  25 mg IntraVENous Once Kevin Pratt MD        methylPREDNISolone sodium (SOLU-MEDROL) injection 125 mg  125 mg IntraVENous Once Kevin Pratt MD         Vital Signs (Current)   There were no vitals filed for this visit. Vital Signs Statistics (for past 48 hrs)     No data recorded    BP Readings from Last 3 Encounters:   05/03/22 122/74   04/21/22 136/76   04/12/22 (!) 156/64     BMI  There is no height or weight on file to calculate BMI. Estimated body mass index is 21.73 kg/m² as calculated from the following:    Height as of 5/3/22: 5' 1\" (1.549 m). Weight as of 5/3/22: 115 lb (52.2 kg).     CBC   Lab Results   Component Value Date    WBC 4.4 05/11/2022    RBC 3.80 05/11/2022    HGB 11.8 05/11/2022    HCT 36.1 05/11/2022    MCV 95.1 05/11/2022    RDW 14.7 05/11/2022     05/11/2022     CMP    Lab Results   Component Value Date     05/11/2022    K 4.3 05/11/2022    K 5.3 04/12/2022     05/11/2022    CO2 17 05/11/2022    BUN 28 05/11/2022    CREATININE 1.3 05/11/2022    GFRAA 50 05/11/2022    GFRAA 60 05/23/2013    AGRATIO 0.8 04/12/2022    LABGLOM 41 05/11/2022    GLUCOSE 160 05/11/2022    PROT 7.5 04/12/2022    PROT 8.1 02/15/2013    CALCIUM 9.4 05/11/2022    BILITOT <0.2 04/12/2022    ALKPHOS 155 04/12/2022    AST 24 04/12/2022    ALT 14 04/12/2022     BMP    Lab Results   Component Value Date     05/11/2022    K 4.3 05/11/2022    K 5.3 04/12/2022     05/11/2022    CO2 17 05/11/2022    BUN 28 05/11/2022    CREATININE 1.3 05/11/2022    CALCIUM 9.4 05/11/2022    GFRAA 50 05/11/2022    GFRAA 60 05/23/2013    LABGLOM 41 05/11/2022    GLUCOSE 160 05/11/2022     POCGlucose  No results for input(s): GLUCOSE in the last 72 hours.    Coags    Lab Results   Component Value Date    PROTIME 12.0 06/19/2021    PROTIME 10.3 12/15/2009    INR 1.03 06/19/2021    APTT 52.4 60/03/9726     HCG (If Applicable) No results found for: PREGTESTUR, PREGSERUM, HCG, HCGQUANT   ABGs   Lab Results   Component Value Date    PHART 7.367 07/28/2019    PO2ART 97.4 07/28/2019    CUJ1QHN 29.6 07/28/2019    MVT3OWO 16.6 07/28/2019    BEART -7.4 07/28/2019    I5DPYRRT 98.3 07/28/2019      Type & Screen (If Applicable)  No results found for: LABABO, LABRH                         BMI: Wt Readings from Last 3 Encounters:       NPO Status:  >8h                          Anesthesia Evaluation  Patient summary reviewed no history of anesthetic complications:   Airway: Mallampati: III  TM distance: >3 FB   Neck ROM: full   Dental:    (+) edentulous      Pulmonary:normal exam    (+) COPD: moderate,  shortness of breath:  asthma:     (-) recent URI, sleep apnea and not a current smoker                           Cardiovascular:    (+) hypertension:, angina: no interval change, past MI:, CAD:, CABG/stent (multiple Stents, angioplasty and stent 11/2020):, dysrhythmias (PVCs): atrial fibrillation, CHF (EF 70):,       ECG reviewed  Rhythm: regular  Rate: normal    Stress test reviewed       Beta Blocker:  Dose within 24 Hrs         Neuro/Psych:   (+) CVA (right sided weakness): residual symptoms, neuromuscular disease:, headaches:, psychiatric history:depression/anxiety    (-) seizures           GI/Hepatic/Renal:   (+) GERD:, renal disease: CRI,          ROS comment: Recently hospitalized with KOLBY and chest pain. Endo/Other:    (+) DiabetesType II DM, using insulin, blood dyscrasia::., .                 Abdominal:             Vascular: negative vascular ROS. Other Findings:               Anesthesia Plan      general     ASA 4       Induction: intravenous. MIPS: Postoperative opioids intended and Prophylactic antiemetics administered. Anesthetic plan and risks discussed with patient. Plan discussed with CRNA. This pre-anesthesia assessment may be used as a history and physical.    DOS STAFF ADDENDUM:    Pt seen and examined, chart reviewed (including anesthesia, drug and allergy history). No interval changes to history and physical examination. Anesthetic plan, risks, benefits, alternatives, and personnel involved discussed with patient. Questions and concerns addressed. Patient(family) verbalized an understanding and agrees to proceed.       Naz Sidhu MD  May 16, 2022  7:03 AM

## 2022-05-16 NOTE — TELEPHONE ENCOUNTER
EDUARD is scheduled for 7/1/22 at 11 am. I will call pt tomorrow, once she is out of [facility] to confirm date/time.

## 2022-05-16 NOTE — PROCEDURES
intact septum were done using EDUARD guidance as well as pressure monitoring , and fluoroscopy in Chinese and CASTRO projections. We placed one SL-1 Sheaths inside the left atrium. A long wire was placed into the left superior pulmonary vein. Then the SL sheath was exchanged over the wire with double curve watchman access sheath. Then a pigtail catheter was inserted into the access sheath and was placed inside the left atrial appendage. Angiography of JOANIE was performed. Pigtail catheter was removed. Then Watchman delivery sheath was inserted into the access sheath. Using fluoroscopy and live EDUARD the anterior lobe of the appendage was located and delivery sheath was advanced into this lobe. Then device was implanted into the appendage under fluoroscopy and live EDUARD imaging. It was noted that the device had a bit of shoulder in the inferior portion. ATug test was done multiple times to insure device stability   EDUARD guidance and 3D guidance revealed device to be well seated did not correct the position. After deployment a tug test was done and stability of device was checked. Position of device was checked. Measurement of device showed appropriate compression of the device. Using color Doppler, no leak around the was noted. PASS criteria for releasing of device were met and device was released. A figure of 8 was used to achieve hemostasis. Patient was extubated and transferred to the floor. No immediate complications noted. Assessment and Summary:    successful deployment of left atrial appendage occlusion device with no complication    WATCHMAN device used 27 mm size     Angiogram revealed good seal and no thrombus     EDUARD confirmed placement and seal    EBL Less than 40 mL  No complications        Plan:     The patient will be admitted and have usual post care  Start anticoagulation  Start ASA  Echocardiogram tomorrow   Patient is participating in the left atrial appendage occlusion/closure registry.  [facility] Registry is approved by the Centers for Medicare and Medicaid Services (CMS) to meet the registry requirements outlined in the national coverage decisions for Percutaneous Left Atrial Appendage Closure     Thank you for letting me participate in this patient's care and please do not hesitate to call me with any questions or concerns.      Lin Sy MD, MPH     [address] [place] [address]  Ph: [phone]  Fax: [phone]

## 2022-05-16 NOTE — H&P
[facility]  Cardiology   Primary Cardiologist: Bunny Agarwal MD  Referring Physician:     Reason for Referral: Left atrial appendage closure    CC: \"I have vaginal bleeding. \"      Subjective:     History of Present Illness:    Emiliano Miner is a 73 y.o. patient with a PMH significant for coronary artery disease, anxiety, diabetes, paroxsymal atrial fibrillation, diastolic heart failure, hypertension, GI bleed. and hyperlipidemia. She has not been on anticoagulation therapy due to GI bleed in the past.     Patient is being referred for Left Atrial Appendage Closure with WATCHMAN device for management of stroke risk resulting from non-valvular atrial fibrillation. Based on their past history, it has been determined that they are poor candidates for long-term oral-anticoagulation, however may be tolerant of short term treatment with warfarin as necessary. Today, she is here for Watchman. She has had recent weight loss for no reason. She has also been having vaginal bleeding. She has not followed up with gynecology for this. Past Medical History:   has a past medical history of Acid reflux, Anemia, Anxiety and depression, Arthritis, Asthma, Atrial fibrillation ([address] CAD (coronary artery disease), Cerebral artery occlusion with cerebral infarction ([address] CHF (congestive heart failure) ([address] Chronic kidney disease--stage III, COPD (chronic obstructive pulmonary disease) ([address] DM2 (diabetes mellitus, type 2) ([address] Dysarthria, Fibromyalgia, Headache(784.0), Hemisensory loss, History of blood transfusion, Hyperlipidemia, Hypertension, IBS (irritable bowel syndrome), Inferior vena cava occlusion ([address] Keratitis, MI, old, Neuropathy, Superior vena cava obstruction, Temporal arteritis ([address] and Wears glasses. Surgical History:   has a past surgical history that includes Tunneled venous port placement; Cholecystectomy; ablation of dysrhythmic focus (1999  and 11/2020);  Cataract removal (Bilateral); joint replacement (Right); Hysterectomy; Artery Biopsy (Right, 04/23/2014); Coronary angioplasty with stent (2020); Knee arthroscopy (Right); Percutaneous Transluminal Coronary Angio (10/2019); Upper gastrointestinal endoscopy (N/A, 7/6/2020); Carotid artery surgery (Left); Colonoscopy (N/A, 4/9/2021); and Colonoscopy (N/A, 10/15/2021). Social History:   reports that she quit smoking about 3 years ago. Her smoking use included cigarettes. She has a 17.50 pack-year smoking history. She has never used smokeless tobacco. She reports that she does not drink alcohol and does not use drugs. Family History:  family history includes Cancer in her mother; Diabetes in her mother; High Blood Pressure in her mother; High Cholesterol in her mother; No Known Problems in her paternal grandfather; Stroke in her mother. Home Medications:  Were reviewed and are listed in nursing record and/or below  Prior to Admission medications    Medication Sig Start Date End Date Taking?  Authorizing Provider   zoster recombinant adjuvanted vaccine [facility]) 50 MCG/0.5ML SUSR injection Inject 0.5 mLs into the muscle once for 1 dose 1/27/22 1/27/22 Yes Natividad Borrero MD   metoprolol succinate (TOPROL XL) 50 MG extended release tablet Take 1 tablet by mouth nightly 1/27/22  Yes Natividad Borrero MD   insulin glargine (BASAGLAR KWIKPEN) 100 UNIT/ML injection pen Inject 8 Units into the skin 2 times daily 1/27/22  Yes Natividad Borrero MD   amLODIPine (NORVASC) 5 MG tablet Take 2 tablets by mouth daily 1/27/22  Yes Natividad Borrero MD   isosorbide mononitrate (IMDUR) 60 MG extended release tablet Take 1 tablet by mouth daily 1/27/22  Yes Natividad Borrero MD   nitroGLYCERIN (NITRODUR) 0.1 MG/HR Place 1 patch onto the skin every 24 hours 1/27/22  Yes Natividad Borrero MD   docusate sodium (COLACE) 100 MG capsule Take 100 mg by mouth 2 times daily 11/29/21  Yes Historical Provider, MD   Erenumab-aooe (AIMOVIG) 70 MG/ML SOAJ INJECT 140 MLS INTO THE SKIN EVERY 30 DAYS 12/15/21  Yes Joss Acosta MD   tiZANidine (ZANAFLEX) 4 MG tablet Take 0.5-1 tablets by mouth 2 times daily as needed (muscle spasms) 12/7/21  Yes Joss Acosta MD   DULoxetine (CYMBALTA) 60 MG extended release capsule Take 1 capsule by mouth daily 12/7/21  Yes Joss Acosta MD   Ubrogepant (UBRELVY) 50 MG TABS Take 1 tablet po PRN at start of headaches, can repeat in 24 hours 12/7/21  Yes Joss Acosta MD   QUEtiapine (SEROQUEL) 25 MG tablet Take 1 tablet by mouth nightly 12/7/21  Yes Joss Acosta MD   atorvastatin (LIPITOR) 80 MG tablet TAKE 1 TABLET BY MOUTH NIGHTLY 11/2/21  Yes Jing Pulido MD   clopidogrel (PLAVIX) 75 MG tablet TAKE ONE TABLET BY MOUTH EVERY DAY 11/2/21  Yes Jing Pulido MD   ranolazine (RANEXA) 1000 MG extended release tablet Take 1 tablet by mouth 2 times daily 10/29/21  Yes Cameron Martinez MD   linaclotide [facility]) 145 MCG capsule Take 1 capsule by mouth every morning (before breakfast) 10/15/21  Yes Augustine Sharma MD   ULTICARE MINI PEN NEEDLES 31G X 6 MM MISC USE WITH INSULINS FOUR TIMES A DAY 7/19/21  Yes Jing Pulido MD   alirocumab (PRALUENT) 75 MG/ML SOAJ injection pen Inject 1 mL into the skin every 14 days 3/29/21  Yes Aloma Mcardle, APRN - CNP   omeprazole (PRILOSEC) 20 MG delayed release capsule TAKE 1 CAPSULE BY MOUTH DAILY 3/5/21  Yes Jing Pulido MD   aspirin 81 MG chewable tablet Take 1 tablet by mouth daily 12/24/20  Yes Celine Lewis MD   nitroGLYCERIN (NITROSTAT) 0.4 MG SL tablet Place 1 tablet under the tongue every 5 minutes as needed for Chest pain up to max of 3 total doses.  If no relief after 1 dose, call 911. 12/16/20  Yes Jing Pulido MD   albuterol (PROVENTIL) (2.5 MG/3ML) 0.083% nebulizer solution Take 3 mLs by nebulization every 4 hours as needed for Wheezing 3/25/20  Yes Jing Pulido MD   albuterol sulfate HFA (VENTOLIN HFA) 108 (90 Base) MCG/ACT inhaler Inhale 2 puffs into the lungs every 4 hours as needed for Wheezing or Shortness of Breath 12/6/19  Yes Marino Guzmán MD        CURRENT Medications:  No current facility-administered medications for this visit. Allergies:  Patient has no known allergies. Review of Systems: All reviewed and refer to HPI  · Constitutional: no unanticipated weight loss. There's been no change in energy level, sleep pattern, or activity level. No fevers, chills. · Eyes: No visual changes or diplopia. No scleral icterus. · ENT: No Headaches, hearing loss or vertigo. No mouth sores or sore throat. · Cardiovascular: No Chest pain, tightness or discomfort.  No Shortness of breath. No Dyspnea on exertion, Orthopnea, Paroxysmal nocturnal dyspnea or breathlessness at rest.   No Palpitations.  No Syncope ('blackouts', 'faints', 'collapse') or dizziness. · Respiratory: No cough or wheezing, no sputum production. No hematemesis. · Gastrointestinal: No abdominal pain, appetite loss, blood in stools. No change in bowel or bladder habits. · Genitourinary: No dysuria, trouble voiding, or hematuria. · Musculoskeletal:  No gait disturbance, no joint complaints. · Integumentary: No rash or pruritis. · Neurological: No headache, diplopia, change in muscle strength, numbness or tingling. · Psychiatric: No anxiety or depression. · Endocrine: No temperature intolerance. No excessive thirst, fluid intake, or urination. No tremor. · Hematologic/Lymphatic: No abnormal bruising or bleeding, blood clots or swollen lymph nodes. · Allergic/Immunologic: No nasal congestion or hives. Objective: all reveiwed      PHYSICAL EXAM:      Vitals:    01/27/22 1331   BP: 134/76   Pulse: 75   SpO2: 99%    Weight: 105 lb (47.6 kg)       General Appearance:  Alert, cooperative, no distress, appears stated age. Head:  Normocephalic, without obvious abnormality, atraumatic. Eyes:  Pupils equal and round. No scleral icterus.    Mouth: Moist mucosa, no pharyngeal erythema. Nose: Nares normal. No drainage or sinus tenderness. Neck: Supple, symmetrical, trachea midline. No adenopathy. No tenderness/mass/nodules. No carotid bruit or elevated JVD. Lungs:   Respiratory Effort: Normal   Auscultation: Clear to auscultation bilaterally, respirations unlabored. No wheeze, rales   Chest Wall:  No tenderness or deformity. Cardiovascular:    Pulses  Palpation: normal   Ascultation: Regular rate, S1/ S2 normal. No murmur, rub, or gallop. 2+ radial and pedal pulses, symmetric  Carotid  Femoral   Abdomen and Gastrointestinal:   Soft, non-tender, bowel sounds active. Liver and Spleen  Masses   Musculoskeletal: No muscle wasting  Back  Gait   Extremities: Extremities normal, atraumatic. No cyanosis or edema. No cyanosis clubbing       Skin: Inspection and palpation performed, no rashes or lesions. Pysch: Normal mood and affect.  Alert and oriented to time place person   Neurologic: Normal gross motor and sensory exam.       Labs: all labs have been reviewed      Lab Results   Component Value Date    WBC 3.6 01/13/2022    RBC 2.83 01/13/2022    HGB 9.1 01/13/2022    HCT 27.2 01/13/2022    MCV 96.0 01/13/2022    RDW 14.5 01/13/2022     01/13/2022     Lab Results   Component Value Date     01/13/2022    K 4.1 01/13/2022    K 3.8 01/11/2022    CL 99 01/13/2022    CO2 22 01/13/2022    BUN 35 01/13/2022    CREATININE 1.4 01/13/2022    GFRAA 46 01/13/2022    GFRAA 60 05/23/2013    AGRATIO 0.9 01/09/2022    LABGLOM 38 01/13/2022    GLUCOSE 90 01/13/2022    PROT 8.1 01/09/2022    PROT 8.1 02/15/2013    CALCIUM 8.8 01/13/2022    BILITOT 0.4 01/09/2022    ALKPHOS 120 01/09/2022    AST 23 01/09/2022    ALT 13 01/09/2022     No results found for: PTINR  Lab Results   Component Value Date    LABA1C 7.8 01/09/2022     Lab Results   Component Value Date    CKTOTAL 41 08/22/2019    CKMB 2.3 10/06/2013    CKMBINDEX 0.9 04/01/2010    TROPONINI <0.01 01/10/2022       Cardiac, Vascular and Imaging Data: All Personally Reviewed in Detail by Myself      EKG:     Echocardiogram:   ECHO 1/11/2022  Normal LV size and wall motion. EF is   60%. Grade II diastolic dysfunction  with elevated LV filling pressures. The left atrium is mildly dilated. Normal right ventricular size and function  Trivial Mitral and Tricuspid regurgitation. Stress Test:   Stress test 3/11/2021   Summary    Pharmacological Stress/MPI Results:        1. Technically a satisfactory study.    2. No evidence of Ischemia by Myocardial Perfusion Imaging.    3. Gated Study shows normal LV size and Systolic function; EF is 70 %. Cath:  Cardiac cath 12/21/18  ANGIOGRAPHY FINDINGS:  1. Left main comes from the left coronary cusp. YAKELIN 3 flow. No  stenosis. 2.  Left anterior descending artery is patent. Distal left anterior  descending artery has a 70% stenosis. 3.  Left circumflex has patent stents. No significant stenosis. 4.  Right coronary artery has patent stents. No significant stenosis. 5.  LV ejection fraction 60%.     SUMMARY:  Patent coronary artery stents. Distal left anterior  descending artery 70% stenosis. Other imaging:     Assessment and Plan     Atrial Fibrillation, paroxsymal    Primary Cardiologist: Javier Renteria  Referring Physician:   Referring Reason: GI bleed/Vaginal bleeding    CHADSvasc is at least 6 (Age,Female,HTN,CHF,CAD,DM)  HASbled is at least 3    GI bleed. Recent vaginal bleeding. Encouraged her to follow up with gynecology. We have discussed their unique stroke and bleeding risk both on and off oral-anticoagulation, and the rationale for this referral.  Based on both stroke and bleeding risk, a shared decision has been made to pursue closure of the left atrial appendage as an alternative to oral anticoagulant therapy for stroke prophylaxis and to reduce their long term risk of incidence of bleeding.      Patient has high [id number] score 6 and requires anticoagulation to prevent

## 2022-05-16 NOTE — TELEPHONE ENCOUNTER
5/16/2022 s/p SUCCESSFUL WATCHMAN LAAC PROCEDURE PER DR. Christine Verduzco successful deployment of left atrial appendage occlusion device with no complication, WATCHMAN device used 27 mm size. --1-week f/u with Etha Osgood, APRN - CNP on 5/24/2022 at 11:40 PM  --6-month f/u with Etha Osgood, APRN - CNP on 11/21/2022 at 11:40PM  --1-year f/u unable to schedule d/t calendar doesn't go into 1/2023  All will print on discharge after visit summary. [facility] [place] procedure EDUARD to be completed 45-59 days post procedure (6/30/2022-67/14/2022) Order placed. Thanks.    Lorenza Lafleur RN, BSN   Watchman Coordinator

## 2022-05-16 NOTE — DISCHARGE SUMMARY
Jeff Appl  1955  [id number]      I have reviewed the notes, assessments, and/or procedures (including HPI, PMH, SH, FH, ROS and PE). Condition excellent       Additional history:  Patient is doing well following WATCHMAN implant. BR X4 hrs post procedure. Now ambulation without any issues. Right groin site CDI. Tolerates sips of fluids. Physical Exam:   BP (!) 156/68   Pulse 63   Temp 97.3 °F (36.3 °C) (Temporal)   Resp 14   Ht 5' (1.524 m)   Wt 118 lb (53.5 kg)   SpO2 100%   BMI 23.05 kg/m²   Wt Readings from Last 2 Encounters:   05/16/22 118 lb (53.5 kg)   05/03/22 115 lb (52.2 kg)     Constitutional: She is oriented to person, place, and time. She appears well-developed and well-nourished. In no acute distress. HEENT: Normocephalic and atraumatic. Sclerae anicteric. No xanthelasmas. EOM's intact. Neck: Neck supple. No JVD present. Cardiovascular: RRR, normal S1 and S2; no murmur/gallop or rub,  Pulmonary/Chest: Effort normal.  Lungs clear to auscultation. Chest wall nontender  Abdominal: soft, nontender, nondistended. + bowel sounds  Extremities: No edema, cyanosis, or clubbing. Pulses are 2+ DP/PT bilaterally. Cap refill brisk. Neurological: No focal deficit. Skin: Skin is warm and dry. Psychiatric: She has a normal mood and affect. Her speech is normal and behavior is normal.        Limited TTE DATE: 05/16/22      Impression     1. Atrial fibrillation (non-valvular) s/p successful percutaneous left atrial appendage occlusion  CHADSVASC- 6  CBC/BMP reviewed  Limited ECHO without pericardial effusion. Patient has ambulated. Tolerating sips liquids without N/V. Recommendations     - Treatment with dual antiplatalet - asa 81mg daily and plavix 75mg daily for at least 6 months then consider single antiplatelet  - EDUARD at 45 days   -Toprol XL for rate control  - antibiotic prophylaxis (amoxicillin 2 g once 60 minutes prior to procedure) for 6 months for:  a.      Dental procedures including cleanings  b. Gastrointestinal procedures  c.     Genitourinary procedures  d. Respiratory procedures involving incision or biopsy  e. Procedures on infected skin or muscle.         Medication List        CONTINUE taking these medications      * albuterol sulfate  (90 Base) MCG/ACT inhaler  Commonly known as: Ventolin HFA  Inhale 2 puffs into the lungs every 4 hours as needed for Wheezing or Shortness of Breath     * albuterol (2.5 MG/3ML) 0.083% nebulizer solution  Commonly known as: PROVENTIL  TAKE 3 MLS BY NEBULIZATION EVERY 4 HOURS AS NEEDED FOR WHEEZING     amLODIPine 5 MG tablet  Commonly known as: NORVASC  Take 2 tablets by mouth daily     Aspirin Low Dose 81 MG EC tablet  Generic drug: aspirin  TAKE 1 TABLET BY MOUTH DAILY     atorvastatin 80 MG tablet  Commonly known as: LIPITOR  TAKE 1 TABLET BY MOUTH NIGHTLY     Basaglar KwikPen 100 UNIT/ML injection pen  Generic drug: insulin glargine  Inject 8 Units into the skin 2 times daily     clopidogrel 75 MG tablet  Commonly known as: PLAVIX  TAKE ONE TABLET BY MOUTH EVERY DAY     * Dexcom G6  Madisyn  1 each by Does not apply route daily     * FreeStyle Alem 14 Day [place] Ramez Bar  1 each by Does not apply route 3 times daily     * Dexcom G6 Sensor Misc  1 each by Does not apply route every 14 days     * FreeStyle Alem 14 Day Sensor Misc  1 each by Does not apply route every 14 days     Dexcom G6 Transmitter Misc  1 each by Does not apply route daily     docusate sodium 100 MG capsule  Commonly known as: COLACE     DULoxetine 60 MG extended release capsule  Commonly known as: CYMBALTA  Take 1 capsule by mouth daily     fexofenadine 180 MG tablet  Commonly known as: ALLEGRA  Take 1 tablet by mouth daily     isosorbide mononitrate 60 MG extended release tablet  Commonly known as: IMDUR  Take 1 tablet by mouth daily     linaclotide 145 MCG capsule  Commonly known as: Linzess  Take 1 capsule by mouth every morning (before breakfast)     metoprolol succinate 50 MG extended release tablet  Commonly known as: TOPROL XL  Take 1 tablet by mouth nightly     montelukast 10 MG tablet  Commonly known as: SINGULAIR  Take 1 tablet by mouth daily     * nitroGLYCERIN 0.4 MG SL tablet  Commonly known as: NITROSTAT  Place 1 tablet under the tongue every 5 minutes as needed for Chest pain up to max of 3 total doses. If no relief after 1 dose, call 911. * nitroGLYCERIN 0.1 MG/HR  Commonly known as: NITRODUR  Place 1 patch onto the skin every 24 hours     omeprazole 20 MG delayed release capsule  Commonly known as: PRILOSEC  TAKE 1 CAPSULE BY MOUTH DAILY     oxyCODONE-acetaminophen 7.5-325 MG per tablet  Commonly known as: PERCOCET  Take 1 tablet by mouth every 6 hours as needed for Pain (max 3-4 per day) for up to 28 days. Pharmacist Choice Lancets Misc  USE TO TEST BLOOD GLUCOSE TWICE DAILY     QUEtiapine 25 MG tablet  Commonly known as: SEROQUEL  Take 1-2 tablets by mouth nightly     ranolazine 1000 MG extended release tablet  Commonly known as: Ranexa  Take 1 tablet by mouth 2 times daily     tiZANidine 4 MG tablet  Commonly known as: Zanaflex  Take 0.5-1 tablets by mouth 2 times daily as needed (muscle spasms)     True Metrix Blood Glucose Test strip  Generic drug: blood glucose test strips  USE TO TEST BLOOD GLUCOSE TWICE DAILY     Ubrelvy 50 MG Tabs  Generic drug: Ubrogepant  Take 1 tablet po PRN at start of headaches, can repeat in 24 hours     UltiCare Alcohol Swabs 70 % Pads  USE TO TEST BLOOD GLUCOSE TWICE DAILY     * UltiCare Mini Pen Needles 31G X 6 MM Misc  Generic drug: Insulin Pen Needle  USE WITH INSULINS FOUR TIMES A DAY     * UltiCare Short Pen Needles 31G X 8 MM Misc  Generic drug: Insulin Pen Needle  USE WITH insulin pens           * This list has 10 medication(s) that are the same as other medications prescribed for you. Read the directions carefully, and ask your doctor or other care provider to review them with you. STOP taking these medications      Hibiclens 4 % external liquid  Generic drug: chlorhexidine              Dispo: It is medically necessary that patients undergoing percutaneous left atrial appendage occlusion are admitted as an inpatient. This patient had a recovery that was earlier than expected and can be discharged today. Patient will follow up in 1-2 weeks and will undergo TTE at 45 days following WATCHMAN implant.       Electronically signed by CYRUS Rico CNP on 5/16/2022 at 11:47 AM

## 2022-05-17 RX ORDER — VARENICLINE TARTRATE
KIT
Qty: 1 BOX | Refills: 0 | Status: SHIPPED | OUTPATIENT
Start: 2022-05-17 | End: 2022-05-25

## 2022-05-17 NOTE — OP NOTE
[address]   Procedure Note  Patient Name: Angela Cabrera  YOB: 1955  Date of Operation: 4/6/22    Pre-operative Diagnosis:   1. IVC      Post-operative Diagnosis:   1. Same     Procedures Performed:  1) Bilateral femoral vein access  2) right brachial vein access   3) Bilateral lower extremity venogram   4) IVC angioplasty       :  Nani Escamilla MD.    Assistant:  None . Anesthesia:  IV sedation and local anesthesia. Estimated Blood Loss:  10 mL. Indications for Procedure:  Angela Cabrera is a 73 y.o. female who was evaluated as an outpatient with chronic arterial insufficiency with edema of the lower extremities, additionally requires venous access for future watchman placement and was deemed an appropriate candidate for an angiogram and possible intervention. Informed consent was obtained after discussion of potential benefits, alternatives and risks of the procedure, including but not limited to bleeding, infection, worsening of arterial insufficiency, and kidney injury due to contrast administration. Details of Procedure: The patient was taken to the cardiac cath lab and placed in supine position. IV sedation anesthesia was administered by the anesthesia team. The operative area was clipped, prepared and draped in sterile fashion. A brief time out was performed per hospital protocol. The bilateral  femoral vein was accessed under fluroscopic and ultrasound guidance with a micropuncture needle, wire, then sheath. A 4-Kazakh sheath was inserted over a wire. An iliofemoral venogram  was performed. Using a pig tail catheter positioned at the level of the renal arteries, a IVC venogram  was performed. IVC is 100% occluded just below the renal veins. A 7 [place] sheath was inserted over the wire into the femoral vein and after 5000 units of intravenous heparin was administered and three minutes allowed to elapse.     The IVC was recanalized using a 0.035 wire and 0.035 micro  catheter. Balloon angioplasty of the IVC was performed using a 8.0/10.0/12.0  Balloon with good angiographic results     A completion arteriogram was performed. The patient tolerated the procedure well, was awakened and was taken to the post-operative care unit in stable condition.      Impression/Plan:   100% occluded IVC  S/p successful balloon angioplasty   Anticoagulation as tolerated, IVC appears patent for future access and deployment of watchman device     Felicita Medrano MD [facility], Interventional Cardiology, and Peripheral Vascular [address] [facility]   (C): [phone]  (O): [phone]

## 2022-05-17 NOTE — TELEPHONE ENCOUNTER
Pt is agreeable to date/time. Went over pre-procedure instructions. Pt v/u. Published on [place] and e-mail to Tesha Chaparro.

## 2022-05-19 DIAGNOSIS — G89.4 CHRONIC PAIN SYNDROME: ICD-10-CM

## 2022-05-19 DIAGNOSIS — J20.9 COPD WITH ACUTE BRONCHITIS (HCC): ICD-10-CM

## 2022-05-19 DIAGNOSIS — J44.0 COPD WITH ACUTE BRONCHITIS (HCC): ICD-10-CM

## 2022-05-19 RX ORDER — ALBUTEROL SULFATE 90 UG/1
2 AEROSOL, METERED RESPIRATORY (INHALATION) EVERY 4 HOURS PRN
Qty: 54 G | Refills: 5 | Status: SHIPPED | OUTPATIENT
Start: 2022-05-19 | End: 2022-09-28 | Stop reason: SDUPTHER

## 2022-05-22 NOTE — PROGRESS NOTES
chronic kidney disease, without long-term current use of insulin, unspecified CKD stage ([facility] [address] Sugars better . Advised to check / record sugars and bring in log   On Trulicity once / wk +  Lantus $0 U in am + 50 U in pm       Essential hypertension  Controlled . Cont same   -     amLODIPine (NORVASC) 10 MG tablet; Take 0.5 tablets by mouth daily  -     metoprolol succinate (TOPROL XL) 25 MG extended release tablet; Take 0.5 tablets by mouth 2 times daily  -     isosorbide mononitrate (IMDUR) 30 MG extended release tablet; Take 1 tablet by mouth daily  -     torsemide (DEMADEX) 20 MG tablet; Take 1 tablet by mouth daily  -     hydrALAZINE (APRESOLINE) 50 MG tablet; Take 1 tablet by mouth 2 times daily    Coronary artery disease involving native coronary artery of native heart without angina pectoris  -     metoprolol succinate (TOPROL XL) 25 MG extended release tablet; Take 0.5 tablets by mouth 2 times daily  -     isosorbide mononitrate (IMDUR) 30 MG extended release tablet; Take 1 tablet by mouth daily  -     torsemide (DEMADEX) 20 MG tablet; Take 1 tablet by mouth daily  -     clopidogrel (PLAVIX) 75 MG tablet; Take 1 tablet by mouth daily  -     ranolazine (RANEXA) 1000 MG extended release tablet; Take 1 tablet by mouth 2 times daily  -     aspirin EC 81 MG EC tablet; Take 1 tablet by mouth daily  -     atorvastatin (LIPITOR) 80 MG tablet; Take 1 tablet by mouth daily      Gastroesophageal reflux disease without esophagitis  Dc PPI . Can take Zantac   -     ranitidine (ZANTAC) 150 MG tablet; Take 1 tablet by mouth 2 times daily as needed for Heartburn    Renal insufficiency  Avoid Nsaids / PPI . Control BP / Sugar    Need for shingles vaccine At Pharmacy  -     zoster recombinant adjuvanted vaccine (SHINGRIX) 50 MCG SUSR injection;  Inject 0.5 mLs into the muscle once for 1 dose      Chronic obstructive pulmonary disease, unspecified COPD type ([facility]  -     budesonide-formoterol (SYMBICORT) 160-4.5 MCG/ACT AERO; Inhale 2 puffs into the lungs daily  -     albuterol sulfate HFA (PROAIR HFA) 108 (90 Base) MCG/ACT inhaler; Inhale 2 puffs into the lungs every 6 hours as needed for Wheezing      Mixed hyperlipidemia  Lipitor 80   -         Anxiety  . cymbalta per Pain Dr   -           Plan:     Self Management Goals. Know which medication is for what condition:   Know correct dose/frequency of medications: Take medications at the same time each day:   Stay current on medication refills:   If taking OTC's check with MD/pharmacy first about interactions:   Monitor sugars and record time/meal:  A1C goal 7.0 or less:  LDL goal 270 or less:  Systolic BP < or equal to 861:  Diastolic BP < or equal to 85    Current Flu and Pneumonia Vax:    Exercise 3-5 times per week:  Keep check of weight:  Weighting machine:                            [employer]      9/14/18 Patient presents with: Follow-Up from Hospital    Was in ER on 9/1/18 for Chest Pains . Cardiac w/u neg . She was discharged once pain resolved   To see Cardiology 10/5/18                 Last seen  8/13/18 Patient presents with: Follow-Up from Hospital: 8/2/18     Admit Date: 7/26/2018   Discharge Date:   7/27/2018    Seen for Chest Pain  . Her chest pain was dull and not relieved with nitro. ecg showed minor ST/T changes that were not changed from her previous ecg. Her troponins were negative x 3. She was seen by cardiology as well. Her chest pain resolved.  Cardiology felt that her pain was not typical of angina and deemed her safe to be discharged home.        8/6/18   Seen by CNP / Cardiology     Coronary artery disease involving native coronary artery of native heart with angina pectoris ([address]  -continues with chronic chest pain (typical and atypical)  -recent stenting of RCA and LCX (7/2018)  -prior stent to RCA in 2016  -continue medical management with nitrate, ranexa  -continue ASA, Plavix, BB and statin         amlodipine, hydralazine, isosorbide mononitrate, lasix, HCTZ, ranexa, ASA and plavix        Diabetes   Hypoglycemia symptoms include headaches (off and on ). Pertinent negatives for diabetes include no visual change. Hypertension   Associated symptoms include headaches (off and on ). Inpatient course: Discharge summary reviewed- see chart. Review of Systems    Vitals:    08/13/18 1358   BP: 117/61   Pulse: 64   Temp: 97.3 °F (36.3 °C)   SpO2: 99%   Weight: 119 lb (54 kg)   Height: 4' 11\" (1.499 m)     Body mass index is 24.04 kg/m².    Wt Readings from Last 3 Encounters:   08/13/18 119 lb (54 kg)   08/13/18 120 lb (54.4 kg)   08/06/18 119 lb (54 kg)     BP Readings from Last 3 Encounters:   08/13/18 117/61   08/13/18 138/89   08/06/18 118/70       Physical Exam Pt lives with his son.    He denies ETOH/drug/tob use.  Former smoker, quit in 1974.

## 2022-05-23 NOTE — PROGRESS NOTES
The [address] [place] [address] [address] [place] [address]  [phone]    PrimaryCare Doctor:  Maxim Haas MD  Primary Cardiologist: Dr. Mckenzie Ramos    Chief Complaint   Patient presents with    Follow-up     chest pains      History of Present Illness:  Ilana Guadalupe is a 73 y.o. female with PMH of PAF. Patient presents to [facility] cardiology for follow up for JOANIE closure with Watchman implant. Patient determined to be poor candidate for long-term anticoagulation D/T    Today she reports she has recovered well from [facility] procedure. R groin site CDI, no hematoma. EKG- NSR. She reports intermittent CP that is chronic for her and is relieved with NTG SL. Denies SOB, LH, dizziness, palpitations, syncope. Review of Systems:   General: Denies fever, chills, fatigue, weakness  Skin: Denies skin changes, rash, itching, lesions. HEENT: Denies headache, dizziness, vision changes, nosebleeds, sore throat, nasal drainage  RESP: Denies cough, sputum, dyspnea, wheeze, snoring  CARD: Denies palpitations,  murmur  GI:Denies nausea, vomiting, heartburn, loss of appetite, change in bowels  : Denies frequency, pain, incontinence, polyuria  VASC: Denies claudication, leg cramps, clots  MUSC/SKEL: Denies pain, stiffness, arthritis  PSYCH: Denies anxiety, depression, stress  NEURO: Denies numbness, tingling, weakness,change in mood or memory  HEME: Denies abn bruising, bleeding, anemia  ENDO: Denies intolerance to heat, cold, excessive thirst or hunger, hx thyroid disease    BP (!) 144/90   Pulse 80   Ht 5' (1.524 m)   Wt 119 lb 3.2 oz (54.1 kg)   SpO2 98%   BMI 23.28 kg/m²   Wt Readings from Last 3 Encounters:   05/24/22 119 lb 3.2 oz (54.1 kg)   05/16/22 118 lb (53.5 kg)   05/03/22 115 lb (52.2 kg)       Physical Exam:  GEN: Appears well, no acute distress  SKIN: Brown, warm, dry. Nails without clubbing. HEENT: PERRLA. Normocephalic, atraumatic. Neck supple.  No adenopathy. LUNG: AP diameter normal. Clear bilateral. No wheeze, rales, or ronchi. Respiratory effort normal.  HEART: S1S2 A/R. No JVD. No carotid bruit. No murmur, rub or gallop. ABD: Soft, nontender. +BS X 4 quads. No hepatomegaly. EXT: Radial and pedal pulses 2+ and symmetric. Without varicosities. No edema. MUSCSKEL: Good ROM X4 extremities. No deformity. NEURO: A/O X3. Calm and cooperative. Past Medical History:   has a past medical history of Acid reflux, Anemia, Anxiety and depression, Arthritis, Asthma, Atrial fibrillation ([address] CAD (coronary artery disease), Cerebral artery occlusion with cerebral infarction ([address] CHF (congestive heart failure) ([address] Chronic kidney disease--stage III, COPD (chronic obstructive pulmonary disease) ([address] DM2 (diabetes mellitus, type 2) ([address] Dysarthria, Fibromyalgia, Headache(784.0), Hemisensory loss, History of blood transfusion, Hyperlipidemia, Hypertension, IBS (irritable bowel syndrome), Inferior vena cava occlusion ([address] Keratitis, MI, old, Neuropathy, Superior vena cava obstruction, Temporal arteritis ([address] and Wears glasses. Surgical History:   has a past surgical history that includes Tunneled venous port placement; Cholecystectomy; ablation of dysrhythmic focus (1999  and 11/2020); Cataract removal (Bilateral); joint replacement (Right); Hysterectomy; Artery Biopsy (Right, 04/23/2014); Coronary angioplasty with stent (2020); Knee arthroscopy (Right); Percutaneous Transluminal Coronary Angio (10/2019); Upper gastrointestinal endoscopy (N/A, 7/6/2020); Carotid artery surgery (Left); Colonoscopy (N/A, 4/9/2021); and Colonoscopy (N/A, 10/15/2021). Social History:   reports that she quit smoking about 3 years ago. Her smoking use included cigarettes. She has a 17.50 pack-year smoking history. She has never used smokeless tobacco. She reports that she does not drink alcohol and does not use drugs.    Family History:   Family History   Problem Relation Age of Onset    Diabetes Mother     High Cholesterol Mother     Stroke Mother     Cancer Mother     High Blood Pressure Mother     No Known Problems Paternal Grandfather         lung issues        HomeMedications:  Prior to Admission medications    Medication Sig Start Date End Date Taking? Authorizing Provider   albuterol sulfate  (90 Base) MCG/ACT inhaler INHALE 2 PUFFS INTO THE LUNGS EVERY 4 HOURS AS NEEDED FOR WHEEZING OR SHORTNESS OF BREATH 5/19/22  Yes MD GONZALES CarneyTICARE SHORT PEN NEEDLES 31G X 8 MM MISC USE WITH insulin pens 5/11/22  Yes Oz Hunt MD   fexofenadine (ALLEGRA) 180 MG tablet Take 1 tablet by mouth daily 5/4/22 6/3/22 Yes Oz Hunt MD   montelukast (SINGULAIR) 10 MG tablet Take 1 tablet by mouth daily 5/4/22  Yes Oz Hunt MD   oxyCODONE-acetaminophen (PERCOCET) 7.5-325 MG per tablet Take 1 tablet by mouth every 6 hours as needed for Pain (max 3-4 per day) for up to 28 days.  5/3/22 5/31/22 Yes Yuliana Bright MD   DULoxetine (CYMBALTA) 60 MG extended release capsule Take 1 capsule by mouth daily 5/3/22  Yes Yuliana Bright MD   QUEtiapine (SEROQUEL) 25 MG tablet Take 1-2 tablets by mouth nightly 5/3/22  Yes Yuliana Bright MD   Ubrogepant (UBRELVY) 50 MG TABS Take 1 tablet po PRN at start of headaches, can repeat in 24 hours 5/3/22  Yes Yuliana Bright MD   tiZANidine (ZANAFLEX) 4 MG tablet Take 0.5-1 tablets by mouth 2 times daily as needed (muscle spasms) 5/3/22  Yes Yuliana Bright MD   ASPIRIN LOW DOSE 81 MG EC tablet TAKE 1 TABLET BY MOUTH DAILY 4/19/22  Yes Oz Hunt MD   TRUE METRIX BLOOD GLUCOSE TEST strip USE TO TEST BLOOD GLUCOSE TWICE DAILY 4/12/22  Yes Oz Hunt MD   UltiCare Alcohol Swabs 70 % PADS USE TO TEST BLOOD GLUCOSE TWICE DAILY 4/12/22  Yes Oz Hunt MD   Pharmacist Choice Lancets MISC USE TO TEST BLOOD GLUCOSE TWICE DAILY 4/12/22  Yes Oz Hunt MD   Continuous Blood Gluc  (FREESTYLE LISSETT 14 DAY READER) DAYO 1 each by Does not apply route 3 times daily 3/31/22  Yes Marques Rogers MD   Continuous Blood Gluc Sensor (FREESTYLE LISSETT 14 DAY SENSOR) MISC 1 each by Does not apply route every 14 days 3/31/22  Yes Marques Rogers MD   Continuous Blood Gluc Transmit (DEXCOM G6 TRANSMITTER) MISC 1 each by Does not apply route daily 3/15/22  Yes Marques Rogers MD   Continuous Blood Gluc Sensor (DEXCOM G6 SENSOR) MISC 1 each by Does not apply route every 14 days 3/15/22  Yes Marques Rogers MD   Continuous Blood Gluc  ([address]) [address] 1 each by Does not apply route daily 3/15/22  Yes Marques Rogers MD   albuterol (PROVENTIL) (2.5 MG/3ML) 0.083% nebulizer solution TAKE 3 MLS BY NEBULIZATION EVERY 4 HOURS AS NEEDED FOR WHEEZING 3/14/22  Yes Marques Rogers MD   ULTICARE MINI PEN NEEDLES 31G X 6 MM MISC USE WITH INSULINS FOUR TIMES A DAY 3/14/22  Yes Marques Rogers MD   metoprolol succinate (TOPROL XL) 50 MG extended release tablet Take 1 tablet by mouth nightly 1/27/22  Yes Marques Rogers MD   insulin glargine (BASAGLAR KWIKPEN) 100 UNIT/ML injection pen Inject 8 Units into the skin 2 times daily 1/27/22  Yes Marques Rogers MD   amLODIPine (NORVASC) 5 MG tablet Take 2 tablets by mouth daily 1/27/22  Yes Marques Rogers MD   isosorbide mononitrate (IMDUR) 60 MG extended release tablet Take 1 tablet by mouth daily 1/27/22  Yes Marques Rogers MD   nitroGLYCERIN (NITRODUR) 0.1 MG/HR Place 1 patch onto the skin every 24 hours 1/27/22  Yes Marques Rogers MD   docusate sodium (COLACE) 100 MG capsule Take 100 mg by mouth 2 times daily 11/29/21  Yes Vania Provider, MD   atorvastatin (LIPITOR) 80 MG tablet TAKE 1 TABLET BY MOUTH NIGHTLY 11/2/21  Yes Marques Rogers MD   clopidogrel (PLAVIX) 75 MG tablet TAKE ONE TABLET BY MOUTH EVERY DAY 11/2/21  Yes Marques Rogers MD   ranolazine (RANEXA) 1000 MG extended release tablet Take 1 tablet by mouth 2 times daily 10/29/21 PROBNP 758 07/30/2021       Parameters:   > 450 pg/mL under age 48  > 900 pg/mL ages 54-65  > 1800 pg/mL over age 76    Iron Studies:    Lab Results   Component Value Date    TIBC 275 01/11/2022    TIBC 294 06/30/2021    TIBC 230 04/02/2021    FERRITIN 80.6 01/11/2022    FERRITIN 267.0 06/30/2021    FERRITIN 386.9 04/02/2021     GLUCOSE: No results for input(s): GLUCOSE in the last 72 hours. LIVER PROFILE:   Lab Results   Component Value Date    AST 24 04/12/2022    ALT 14 04/12/2022    LIPASE 35.0 10/18/2021    AMYLASE 44 10/27/2015    LABALBU 3.3 04/12/2022    BILIDIR <0.2 08/22/2019    BILITOT <0.2 04/12/2022    ALKPHOS 155 04/12/2022     PT/INR:   Lab Results   Component Value Date    PROTIME 12.0 06/19/2021    PROTIME 10.3 12/15/2009    INR 1.03 06/19/2021     Cardiac Enzymes:  Lab Results   Component Value Date    CKTOTAL 41 08/22/2019    CKMB 2.3 10/06/2013    CKMB 1.34 09/25/2010    CKMB 0.48 04/01/2010    CKMBINDEX 0.9 04/01/2010    TROPONINI <0.01 04/12/2022     FASTING LIPID PANEL:  Lab Results   Component Value Date    CHOL 185 08/29/2019    HDL 70 08/29/2019    HDL 58 05/14/2012    LDLCALC 89 08/29/2019    TRIG 131 08/29/2019       Cardiac Imaging: Reports reviewed with patient today. EKG: Today, NSR HR 89 with chronic ST abn    ECHO: Limited Post Watchman procedure   Summary   There is no evidence of pericardial effusion    Assessment/Plan:    Impression     1.  Atrial fibrillation (non-valvular) s/p successful percutaneous left atrial appendage occlusion  CHADSVASC- 6    Recommendations      - Treatment with dual antiplatalet - asa 81mg daily and plavix 75mg daily for at least 6 months then consider single antiplatelet  - EDUARD at 45 days   -Toprol XL for rate control  - antibiotic prophylaxis (amoxicillin 2 g once 60 minutes prior to procedure) for 6 months for:  a.     Dental procedures including cleanings  b.     Gastrointestinal procedures  c.     Genitourinary procedures  d.     Respiratory procedures involving incision or biopsy  e.     Procedures on infected skin or muscle.         Instructions:   EDUARD instructions on AVS. Reviewed with patient . Verbalizes understanding.     I appreciate the opportunity of cooperating in the care of this individual.    CYRUS Luther - CNP, CNP, 5/24/2022,11:51 AM

## 2022-05-24 ENCOUNTER — OFFICE VISIT (OUTPATIENT)
Dept: CARDIOLOGY CLINIC | Age: 67
End: 2022-05-24
Payer: COMMERCIAL

## 2022-05-24 VITALS
OXYGEN SATURATION: 98 % | HEART RATE: 80 BPM | BODY MASS INDEX: 23.4 KG/M2 | WEIGHT: 119.2 LBS | DIASTOLIC BLOOD PRESSURE: 90 MMHG | HEIGHT: 60 IN | SYSTOLIC BLOOD PRESSURE: 144 MMHG

## 2022-05-24 DIAGNOSIS — R07.9 CHEST PAIN, UNSPECIFIED TYPE: ICD-10-CM

## 2022-05-24 DIAGNOSIS — I48.0 PAROXYSMAL ATRIAL FIBRILLATION (HCC): Primary | ICD-10-CM

## 2022-05-24 PROCEDURE — 1111F DSCHRG MED/CURRENT MED MERGE: CPT | Performed by: NURSE PRACTITIONER

## 2022-05-24 PROCEDURE — 1090F PRES/ABSN URINE INCON ASSESS: CPT | Performed by: NURSE PRACTITIONER

## 2022-05-24 PROCEDURE — G8427 DOCREV CUR MEDS BY ELIG CLIN: HCPCS | Performed by: NURSE PRACTITIONER

## 2022-05-24 PROCEDURE — 3017F COLORECTAL CA SCREEN DOC REV: CPT | Performed by: NURSE PRACTITIONER

## 2022-05-24 PROCEDURE — 99214 OFFICE O/P EST MOD 30 MIN: CPT | Performed by: NURSE PRACTITIONER

## 2022-05-24 PROCEDURE — 1123F ACP DISCUSS/DSCN MKR DOCD: CPT | Performed by: NURSE PRACTITIONER

## 2022-05-24 PROCEDURE — 93000 ELECTROCARDIOGRAM COMPLETE: CPT | Performed by: NURSE PRACTITIONER

## 2022-05-24 PROCEDURE — 1036F TOBACCO NON-USER: CPT | Performed by: NURSE PRACTITIONER

## 2022-05-24 PROCEDURE — G8420 CALC BMI NORM PARAMETERS: HCPCS | Performed by: NURSE PRACTITIONER

## 2022-05-24 PROCEDURE — G8400 PT W/DXA NO RESULTS DOC: HCPCS | Performed by: NURSE PRACTITIONER

## 2022-05-24 NOTE — PATIENT INSTRUCTIONS
Procedure is scheduled for 7/1/22 at 11:00 am. Please arrive at 10:00 am.       [facility] AT [place]   The morning of your Procedure-EDUARD you will park in the hospital parking lot and report directly to the cath lab to check in.     Pre-Procedure Instructions:  1. Do not eat or drink anything 8 hours before your procedure. 2. Hold all diabetic medications the morning of the procedure including, Metfomin. 3. If you take Lantus/Levemir only take ½ your normal dose the evening before. 4. All other medications can be taken in the morning with sips of water. 5. Do not hold anticoagulation therapy: warfarin, eliquis, xarelto therapy. 6. Do not use any lotions, creams or perfume the morning of procedure. 7. Please arrive 1 hour prior to procedure time. 8. Please have a responsible adult to drive you home after procedure. It is recommended you do not drive for 24 hours after procedure. 9. Cath lab will provide you with all post procedure instructions. If you have any questions regarding the procedure itself or medications please call [phone] and ask to speak with a nurse.

## 2022-05-25 ENCOUNTER — OFFICE VISIT (OUTPATIENT)
Dept: PRIMARY CARE CLINIC | Age: 67
End: 2022-05-25
Payer: COMMERCIAL

## 2022-05-25 VITALS
SYSTOLIC BLOOD PRESSURE: 160 MMHG | BODY MASS INDEX: 23.4 KG/M2 | WEIGHT: 119.2 LBS | TEMPERATURE: 97.2 F | HEIGHT: 60 IN | HEART RATE: 77 BPM | DIASTOLIC BLOOD PRESSURE: 70 MMHG | OXYGEN SATURATION: 98 %

## 2022-05-25 DIAGNOSIS — E11.59 TYPE 2 DIABETES MELLITUS WITH OTHER CIRCULATORY COMPLICATION, WITH LONG-TERM CURRENT USE OF INSULIN (HCC): ICD-10-CM

## 2022-05-25 DIAGNOSIS — Z79.4 TYPE 2 DIABETES MELLITUS WITH OTHER CIRCULATORY COMPLICATION, WITH LONG-TERM CURRENT USE OF INSULIN (HCC): ICD-10-CM

## 2022-05-25 DIAGNOSIS — Z12.31 ENCOUNTER FOR SCREENING MAMMOGRAM FOR HIGH-RISK PATIENT: ICD-10-CM

## 2022-05-25 DIAGNOSIS — Z23 NEED FOR SHINGLES VACCINE: ICD-10-CM

## 2022-05-25 DIAGNOSIS — Z23 NEED FOR PNEUMOCOCCAL VACCINATION: ICD-10-CM

## 2022-05-25 DIAGNOSIS — I10 ESSENTIAL HYPERTENSION: ICD-10-CM

## 2022-05-25 DIAGNOSIS — B35.3 TINEA PEDIS, LEFT: ICD-10-CM

## 2022-05-25 DIAGNOSIS — Z23 HIGH PRIORITY FOR COVID-19 VACCINATION: ICD-10-CM

## 2022-05-25 DIAGNOSIS — Z00.00 INITIAL MEDICARE ANNUAL WELLNESS VISIT: Primary | ICD-10-CM

## 2022-05-25 DIAGNOSIS — M81.0 AGE-RELATED OSTEOPOROSIS WITHOUT CURRENT PATHOLOGICAL FRACTURE: ICD-10-CM

## 2022-05-25 DIAGNOSIS — Z13.31 POSITIVE DEPRESSION SCREENING: ICD-10-CM

## 2022-05-25 LAB — HBA1C MFR BLD: 7.4 %

## 2022-05-25 PROCEDURE — G0009 ADMIN PNEUMOCOCCAL VACCINE: HCPCS | Performed by: INTERNAL MEDICINE

## 2022-05-25 PROCEDURE — G8420 CALC BMI NORM PARAMETERS: HCPCS | Performed by: INTERNAL MEDICINE

## 2022-05-25 PROCEDURE — 1090F PRES/ABSN URINE INCON ASSESS: CPT | Performed by: INTERNAL MEDICINE

## 2022-05-25 PROCEDURE — G8400 PT W/DXA NO RESULTS DOC: HCPCS | Performed by: INTERNAL MEDICINE

## 2022-05-25 PROCEDURE — 99213 OFFICE O/P EST LOW 20 MIN: CPT | Performed by: INTERNAL MEDICINE

## 2022-05-25 PROCEDURE — G8427 DOCREV CUR MEDS BY ELIG CLIN: HCPCS | Performed by: INTERNAL MEDICINE

## 2022-05-25 PROCEDURE — 1111F DSCHRG MED/CURRENT MED MERGE: CPT | Performed by: INTERNAL MEDICINE

## 2022-05-25 PROCEDURE — 3051F HG A1C>EQUAL 7.0%<8.0%: CPT | Performed by: INTERNAL MEDICINE

## 2022-05-25 PROCEDURE — 3017F COLORECTAL CA SCREEN DOC REV: CPT | Performed by: INTERNAL MEDICINE

## 2022-05-25 PROCEDURE — 90670 PCV13 VACCINE IM: CPT | Performed by: INTERNAL MEDICINE

## 2022-05-25 PROCEDURE — 1036F TOBACCO NON-USER: CPT | Performed by: INTERNAL MEDICINE

## 2022-05-25 PROCEDURE — 2022F DILAT RTA XM EVC RTNOPTHY: CPT | Performed by: INTERNAL MEDICINE

## 2022-05-25 PROCEDURE — 83036 HEMOGLOBIN GLYCOSYLATED A1C: CPT | Performed by: INTERNAL MEDICINE

## 2022-05-25 PROCEDURE — 1123F ACP DISCUSS/DSCN MKR DOCD: CPT | Performed by: INTERNAL MEDICINE

## 2022-05-25 PROCEDURE — G0438 PPPS, INITIAL VISIT: HCPCS | Performed by: INTERNAL MEDICINE

## 2022-05-25 RX ORDER — CLOTRIMAZOLE AND BETAMETHASONE DIPROPIONATE 10; .64 MG/G; MG/G
CREAM TOPICAL
Qty: 5 G | Refills: 5 | Status: SHIPPED | OUTPATIENT
Start: 2022-05-25

## 2022-05-25 RX ORDER — BUPROPION HYDROCHLORIDE 150 MG/1
150 TABLET, EXTENDED RELEASE ORAL 2 TIMES DAILY
Qty: 60 TABLET | Refills: 5 | Status: SHIPPED | OUTPATIENT
Start: 2022-05-25

## 2022-05-25 RX ORDER — BLOOD-GLUCOSE,RECEIVER,CONT
EACH MISCELLANEOUS
Qty: 1 EACH | Refills: 0 | COMMUNITY
Start: 2022-05-25

## 2022-05-25 RX ORDER — DULOXETIN HYDROCHLORIDE 60 MG/1
60 CAPSULE, DELAYED RELEASE ORAL DAILY
Qty: 30 CAPSULE | Refills: 1 | Status: SHIPPED | OUTPATIENT
Start: 2022-05-25 | End: 2022-05-31 | Stop reason: SDUPTHER

## 2022-05-25 RX ORDER — AMLODIPINE BESYLATE 10 MG/1
10 TABLET ORAL DAILY
Qty: 90 TABLET | Refills: 1 | Status: SHIPPED | OUTPATIENT
Start: 2022-05-25 | End: 2022-06-22 | Stop reason: SDUPTHER

## 2022-05-25 ASSESSMENT — PATIENT HEALTH QUESTIONNAIRE - PHQ9
5. POOR APPETITE OR OVEREATING: 0
1. LITTLE INTEREST OR PLEASURE IN DOING THINGS: 0
4. FEELING TIRED OR HAVING LITTLE ENERGY: 3
SUM OF ALL RESPONSES TO PHQ QUESTIONS 1-9: 11
SUM OF ALL RESPONSES TO PHQ QUESTIONS 1-9: 11
10. IF YOU CHECKED OFF ANY PROBLEMS, HOW DIFFICULT HAVE THESE PROBLEMS MADE IT FOR YOU TO DO YOUR WORK, TAKE CARE OF THINGS AT HOME, OR GET ALONG WITH OTHER PEOPLE: 1
3. TROUBLE FALLING OR STAYING ASLEEP: 3
SUM OF ALL RESPONSES TO PHQ QUESTIONS 1-9: 11
SUM OF ALL RESPONSES TO PHQ QUESTIONS 1-9: 11
7. TROUBLE CONCENTRATING ON THINGS, SUCH AS READING THE NEWSPAPER OR WATCHING TELEVISION: 3
8. MOVING OR SPEAKING SO SLOWLY THAT OTHER PEOPLE COULD HAVE NOTICED. OR THE OPPOSITE, BEING SO FIGETY OR RESTLESS THAT YOU HAVE BEEN MOVING AROUND A LOT MORE THAN USUAL: 1
6. FEELING BAD ABOUT YOURSELF - OR THAT YOU ARE A FAILURE OR HAVE LET YOURSELF OR YOUR FAMILY DOWN: 1
9. THOUGHTS THAT YOU WOULD BE BETTER OFF DEAD, OR OF HURTING YOURSELF: 0

## 2022-05-25 ASSESSMENT — ENCOUNTER SYMPTOMS
CONSTIPATION: 0
BACK PAIN: 1
BLOOD IN STOOL: 0
CHEST TIGHTNESS: 0
VOMITING: 0
ABDOMINAL DISTENTION: 0
NAUSEA: 0
ABDOMINAL PAIN: 1

## 2022-05-25 ASSESSMENT — LIFESTYLE VARIABLES: HOW OFTEN DO YOU HAVE A DRINK CONTAINING ALCOHOL: NEVER

## 2022-05-25 NOTE — PROGRESS NOTES
Subjective:      Patient ID: Amilcar Baldwin is a 73 y.o. female. 5/25/2022 Patient presents with:  Medicare AWV              Last seen  1/27/2022 Patient presents with: Follow-Up from Hospital: [address] [place] [address]-1/9/2022 - 1/13/2022 Chest pain, [place]    .  Chest pain, ,, no further work-up recommended per cardiology    Diabetes mellitus, sliding scale    COPD, no acute exacerbation  .  A. fib, controlled.  Echo noted, discussed with Dr. Debra Montana, at this time we will keep on aspirin and Plavix, she will follow-up with him as outpatient. To discuss watchman device  Renal insuff  monitor closely, improved creatinine to 1.4 this morning.    Anemia,  Chronic    Peripheral vascular disease with discoloration of second left toe, vascular surgery consulted, Doppler noted with no significant occlusive disease, echo ordered per vascular .                           10/1/2020               1/14/2020          9/24/19     8/31/19 !!!           8/29/19      hypertension, diabetes, hyperlipidemia, fibromyalgia, COPD, CKD, CHF and CAD           Review of Systems   Constitutional: Negative for activity change, fatigue and fever. Flu vac 10/21     tdap 10/16      pneumovac 10/13     Shingrex    covid vac 3/21      HENT:        No teeth     No dentures    Eyes:        Last Eye Ex 3/20   Respiratory: Negative for chest tightness. Shortness of breath: baseline. Getting urges to smoke again ! Known COPD   Cardiovascular: Negative. Known CAD with Stents . Atrial Fibrillation , S/P Watchman device 5/22   Gastrointestinal: Positive for abdominal pain. Negative for abdominal distention, blood in stool, constipation, nausea and vomiting. Upper / lower endopscopy 4/19       No Fh of ca colon . Genitourinary: Negative for dysuria, frequency, menstrual problem, urgency and vaginal discharge.            Hysterectomy   Mammogram 1/16    Musculoskeletal: Positive for arthralgias, back pain, gait problem and myalgias. Pain Disorder c/o Pain Management    Neurological: Positive for dizziness, weakness and headaches (Chronic . off and on ). Light-headedness: off and on               Hematological:           Did see Hematology for anemia . Bone Marrow exam Nl    Psychiatric/Behavioral: Positive for dysphoric mood (Remembers her son's murder > 20 yrs ago). Negative for behavioral problems and sleep disturbance. The patient is not nervous/anxious. Objective:   Physical Exam  Vitals reviewed. Constitutional:       General: She is not in acute distress. Appearance: She is not ill-appearing. Cardiovascular:      Rate and Rhythm: Regular rhythm. Heart sounds: Normal heart sounds. Pulmonary:      Breath sounds: Normal breath sounds. Abdominal:      Palpations: Abdomen is soft. Musculoskeletal:         General: No tenderness. Cervical back: Neck supple. Comments:   Corn on second toe on left      Skin:     General: Skin is warm and dry. Findings: Rash (Feet) present. Neurological:      Mental Status: She is oriented to person, place, and time. Mental status is at baseline. Psychiatric:         Attention and Perception: Attention normal.         Mood and Affect: Mood normal.         Behavior: Behavior normal.         Assessment:     Maren was seen today for medicare awv, diabetes and hypertension. Diagnoses and all orders for this visit:    Initial Medicare annual wellness visit    High priority for COVID-19 vaccination  Needs third booster     Need for pneumococcal vaccination    Need for shingles vaccine  At Pharm   -     zoster recombinant adjuvanted vaccine [facility]) 50 MCG/0.5ML SUSR injection; Inject 0.5 mLs into the muscle once for 1 dose      Encounter for screening mammogram for high-risk patient  Long over due   -     NOHEMI DIGITAL SCREEN W OR WO CAD BILATERAL;  Future    Age-related osteoporosis without current pathological fracture  -     DEXA BONE DENSITY 2 SITES; Future    Tinea pedis, left  -     clotrimazole-betamethasone (LOTRISONE) 1-0.05 % cream; Apply topically 2 times daily. Positive depression screening  Adding Wellbutrin   -     DULoxetine (CYMBALTA) 60 MG extended release capsule; Take 1 capsule by mouth daily  -     buPROPion (ZYBAN) 150 MG extended release tablet; Take 1 tablet by mouth 2 times daily        Vaginal spotting if it continues will consult Gynae. On blood thinners     Renal insufficiency  C/O nephrology     Essential hypertension  Uncontrolled . Increase Norvasc to 10 mg / d   -     metoprolol succinate (TOPROL XL) 50 MG extended release tablet; Take 1 tablet by mouth nightly  -     amLODIPine (NORVASC)10  MG tablet;   -     isosorbide mononitrate (IMDUR) 60 MG extended release tablet; Take 1 tablet by mouth daily  -     nitroGLYCERIN (NITRODUR) 0.1 MG/HR; Place 1 patch onto the skin every 24 hours    Type 2 diabetes mellitus with other circulatory complication, with long-term current use of insulin ([facility])  A1C 7.4  Eye exam needed     -     insulin glargine (BASAGLAR KWIKPEN) 100 UNIT/ML injection pen; Inject 8 Units into the skin 2 times daily  -    Gait abnormality  Advised to use cane while walking             Neuropathy    Chronic pain syndrome in Pain management . Coronary artery disease involving native coronary artery of native heart without angina pectoris  -    Plavix 75 mg / d    metoprolol succinate (TOPROL XL) 50 MG extended release tablet; Take 0.5 tablets by mouth daily  -     RANEXA 1000 MG extended release tablet; Take 1 tablet by mouth 2 times daily  -     atorvastatin (LIPITOR) 80 MG tablet; Take 1 tablet by mouth daily  -     aspirin (ASPIRIN LOW DOSE) 81 MG EC tablet; Take 1 tablet by mouth daily  -     isosorbide mononitrate (IMDUR) 30 MG extended release tablet;  Take 1 tablet by mouth daily  -     nitroGLYCERIN (NITROSTAT) 0.4 MG SL tablet; Place 1 tablet under the tongue every 5 minutes as needed for Chest pain up to max of 3 total doses. If no relief after 1 dose, call 911. Diabetic gastroparesis (HCC)  Control sugars . Other hyperlipidemia  80 mg Lipitor             Anxiety  and Depression     cymbalta per Pain Dr   Plan:      Medicare Annual Wellness Visit    Niki Hernandez is here for Medicare AWV and Diabetes    Assessment & Plan   Encounter for screening mammogram for high-risk patient  -     NOHEMI DIGITAL SCREEN W OR WO CAD BILATERAL; Future  High priority for COVID-19 vaccination  Need for pneumococcal vaccination  Need for shingles vaccine  -     zoster recombinant adjuvanted vaccine [facility]) 50 MCG/0.5ML SUSR injection; Inject 0.5 mLs into the muscle once for 1 dose, Disp-0.5 mL, R-0Print  Type 2 diabetes mellitus with other circulatory complication, with long-term current use of insulin (HCC)  -     POCT glycosylated hemoglobin (Hb A1C)  -     Amb External Referral To Ophthalmology  Age-related osteoporosis without current pathological fracture  -     DEXA BONE DENSITY 2 SITES; Future  Positive depression screening  -     DULoxetine (CYMBALTA) 60 MG extended release capsule; Take 1 capsule by mouth daily, Disp-30 capsule, R-1Normal  -     buPROPion (ZYBAN) 150 MG extended release tablet; Take 1 tablet by mouth 2 times daily, Disp-60 tablet, R-5Normal  Chronic pain syndrome  -     DULoxetine (CYMBALTA) 60 MG extended release capsule; Take 1 capsule by mouth daily, Disp-30 capsule, R-1Normal  Chronic painful diabetic neuropathy (HCC)  -     DULoxetine (CYMBALTA) 60 MG extended release capsule; Take 1 capsule by mouth daily, Disp-30 capsule, R-1Normal  Tinea pedis, left  -     clotrimazole-betamethasone (LOTRISONE) 1-0.05 % cream; Apply topically 2 times daily. , Disp-5 g, R-5, Normal      Recommendations for Preventive Services Due: see orders and patient instructions/AVS.  Recommended screening schedule for the next 5-10 years is provided to the patient in written form: see Patient Instructions/AVS.     Return in about 2 weeks (around 6/8/2022) for htn. Subjective     Patient's complete Health Risk Assessment and screening values have been reviewed and are found in Flowsheets. The following problems were reviewed today and where indicated follow up appointments were made and/or referrals ordered. Positive Risk Factor Screenings with Interventions:    Fall Risk:  Do you feel unsteady or are you worried about falling? : (!) yes  2 or more falls in past year?: no  Fall with injury in past year?: no     Fall Risk Interventions:    · Patient advised to follow-up in this office for further evaluation and treatment within 2 week(s)     Depression:  PHQ-9 Total Score: 11    Severity:1-4 = minimal depression, 5-9 = mild depression, 10-14 = moderate depression, 15-19 = moderately severe depression, 20-27 = severe depression    Depression Interventions:  · Medication prescribed- wellbutrin added to cymbalta            Opioid Risk: (Low risk score <55) Opioid risk score: 45    Patient is low risk for opioid use disorder or overdose. Last PDMP Isidoro Carrasquillo as Reviewed:  Review User Review Instant Review Result   Lucie Batista 5/3/2022  5:22 PM Reviewed PDMP [1]     Last Controlled Substance Monitoring Documentation      Office Visit from 4/9/2019 in [address] [place] [address] [facility]   Attestation The Prescription Monitoring Report for this patient was reviewed today.  filed at 04/09/2019 [address] and ACP:  General  In general, how would you say your health is?: Fair  In the past 7 days, have you experienced any of the following: New or Increased Pain, New or Increased Fatigue, Loneliness, Social Isolation, Stress or Anger?: (!) Yes  Select all that apply: (!) New or Increased Pain,New or Increased Fatigue,Loneliness,Social Isolation,Stress,Anger  Do you get the social and emotional support that you need?: Yes  Do you have a Living Will?: Yes    Advance Directives     [place] [facility] Living Will ACP-Advance Directive ACP-Power of     Not on File Filed on 05/13/20 Filed Not on File      General Health Risk Interventions:  · Pain issues: patient advised to follow-up in this office for further evaluation and treatment within 2 week(s)  · Loneliness: patient declines any further intervention for this issue    Health Habits/Nutrition:     Physical Activity: Insufficiently Active    Days of Exercise per Week: 7 days    Minutes of Exercise per Session: 10 min     Have you lost any weight without trying in the past 3 months?: No  Body mass index: 23.28  Have you seen the dentist within the past year?: (!) No    Health Habits/Nutrition Interventions:  · Dental exam overdue:  patient declines dental evaluation    Hearing/Vision:  Do you or your family notice any trouble with your hearing that hasn't been managed with hearing aids?: No  Do you have difficulty driving, watching TV, or doing any of your daily activities because of your eyesight?: (!) Yes  Have you had an eye exam within the past year?: (!) No  No exam data present    Hearing/Vision Interventions:  · Vision concerns:  ophthalmology/optometry referral provided            Objective   Vitals:    05/25/22 1023 05/25/22 1032 05/25/22 1102   BP: (!) 158/75 (!) 158/75 (!) 160/70   Pulse: 77     Temp: 97.2 °F (36.2 °C)     TempSrc: Temporal     SpO2: 98%     Weight: 119 lb 3.2 oz (54.1 kg)     Height: 5' (1.524 m)        Body mass index is 23.28 kg/m². No Known Allergies  Prior to Visit Medications    Medication Sig Taking?  Authorizing Provider   zoster recombinant adjuvanted vaccine (SHINGRIX) 50 MCG/0.5ML SUSR injection Inject 0.5 mLs into the muscle once for 1 dose Yes Chiquis Vickers MD   DULoxetine (CYMBALTA) 60 MG extended release capsule Take 1 capsule by mouth daily Yes Chiquis Vickers MD   buPROPion (ZYBAN) 150 MG extended release tablet Take 1 tablet by mouth 2 times daily Yes Chiquis Vickers MD clotrimazole-betamethasone (LOTRISONE) 1-0.05 % cream Apply topically 2 times daily. Yes Leobardo Estrada MD   albuterol sulfate  (90 Base) MCG/ACT inhaler INHALE 2 PUFFS INTO THE LUNGS EVERY 4 HOURS AS NEEDED FOR WHEEZING OR SHORTNESS OF BREATH Yes Leobardo Estrada MD   fexofenadine (ALLEGRA) 180 MG tablet Take 1 tablet by mouth daily Yes Leobardo Estrada MD   montelukast (SINGULAIR) 10 MG tablet Take 1 tablet by mouth daily Yes Leobardo Estrada MD   oxyCODONE-acetaminophen (PERCOCET) 7.5-325 MG per tablet Take 1 tablet by mouth every 6 hours as needed for Pain (max 3-4 per day) for up to 28 days.  Yes Joon Logan MD   QUEtiapine (SEROQUEL) 25 MG tablet Take 1-2 tablets by mouth nightly Yes Joon Logan MD   tiZANidine (ZANAFLEX) 4 MG tablet Take 0.5-1 tablets by mouth 2 times daily as needed (muscle spasms) Yes Joon Logan MD   ASPIRIN LOW DOSE 81 MG EC tablet TAKE 1 TABLET BY MOUTH DAILY Yes Leobardo Estrada MD   albuterol (PROVENTIL) (2.5 MG/3ML) 0.083% nebulizer solution TAKE 3 MLS BY NEBULIZATION EVERY 4 HOURS AS NEEDED FOR WHEEZING Yes Leobardo Estrada MD   metoprolol succinate (TOPROL XL) 50 MG extended release tablet Take 1 tablet by mouth nightly Yes Leobardo Estrada MD   insulin glargine (BASAGLAR KWIKPEN) 100 UNIT/ML injection pen Inject 8 Units into the skin 2 times daily Yes Leobardo Estrada MD   amLODIPine (NORVASC) 5 MG tablet Take 2 tablets by mouth daily Yes Leobardo Estrada MD   isosorbide mononitrate (IMDUR) 60 MG extended release tablet Take 1 tablet by mouth daily Yes Leobardo Estrada MD   nitroGLYCERIN (NITRODUR) 0.1 MG/HR Place 1 patch onto the skin every 24 hours Yes Leobardo Estrada MD   docusate sodium (COLACE) 100 MG capsule Take 100 mg by mouth 2 times daily Yes Vania Baca MD   atorvastatin (LIPITOR) 80 MG tablet TAKE 1 TABLET BY MOUTH NIGHTLY Yes Leobardo Estrada MD   clopidogrel (PLAVIX) 75 MG tablet TAKE ONE TABLET BY MOUTH EVERY DAY Yes Chad Ramos Marla Taylor MD   ranolazine (RANEXA) 1000 MG extended release tablet Take 1 tablet by mouth 2 times daily Yes Troy Schofield MD   linaclotide Cirilo Naveen) 145 MCG capsule Take 1 capsule by mouth every morning (before breakfast) Yes Ivone Skinner MD   omeprazole (PRILOSEC) 20 MG delayed release capsule TAKE 1 CAPSULE BY MOUTH DAILY Yes Dolores Kam MD   nitroGLYCERIN (NITROSTAT) 0.4 MG SL tablet Place 1 tablet under the tongue every 5 minutes as needed for Chest pain up to max of 3 total doses. If no relief after 1 dose, call 911.  Yes Dolores Kam MD   Continuous Blood Gluc  (FREESTYLE LISSETT 14 DAY READER) DAYO 1 each by Does not apply route 3 times daily  Dolores Kam MD   Continuous Blood Gluc Sensor (FREESTYLE LISSETT 14 DAY SENSOR) MISC 1 each by Does not apply route every 14 days  Dolores Kam MD   Continuous Blood Gluc Transmit (DEXCOM G6 TRANSMITTER) MISC 1 each by Does not apply route daily  Dolores Kam MD   Continuous Blood Gluc Sensor (DEXCOM G6 SENSOR) MISC 1 each by Does not apply route every 14 days  Dolores Kam MD   Continuous Blood Gluc  ([address]) [address] 1 each by Does not apply route daily  Dolores Kam MD   [facility] MINI PEN NEEDLES 31G X 6 MM MISC USE WITH INSULINS FOUR TIMES A DAY  Dolores Kam MD       [facility] (Including outside providers/suppliers regularly involved in providing care):   Patient Care Team:  Dolores Kam MD as PCP - Mino Hamilton MD as PCP - [facility]  Merle David MD as Consulting Physician (Cardiology)  Dexter Rosales MD as Consulting Physician (Obstetrics & Gynecology)  Neida Velasco MD as Consulting Physician (Neurology)     Reviewed and updated this visit:  Tobacco  Allergies  Meds  Med Hx  Surg Hx  Soc Hx  Fam Hx

## 2022-05-25 NOTE — PATIENT INSTRUCTIONS
Personalized Preventive Plan for Ilana Guadalupe - 5/25/2022  Medicare offers a range of preventive health benefits. Some of the tests and screenings are paid in full while other may be subject to a deductible, co-insurance, and/or copay. Some of these benefits include a comprehensive review of your medical history including lifestyle, illnesses that may run in your family, and various assessments and screenings as appropriate. After reviewing your medical record and screening and assessments performed today your provider may have ordered immunizations, labs, imaging, and/or referrals for you. A list of these orders (if applicable) as well as your Preventive Care list are included within your After Visit Summary for your review. Other Preventive Recommendations:    · A preventive eye exam performed by an eye specialist is recommended every 1-2 years to screen for glaucoma; cataracts, macular degeneration, and other eye disorders. · A preventive dental visit is recommended every 6 months. · Try to get at least 150 minutes of exercise per week or 10,000 steps per day on a pedometer . · Order or download the FREE \"Exercise & Physical Activity: Your Everyday Guide\" from [employer]. Call 4-105.120.4876 or search [employer] online. · You need 7973-0161 mg of calcium and 0606-9185 IU of vitamin D per day. It is possible to meet your calcium requirement with diet alone, but a vitamin D supplement is usually necessary to meet this goal.  · When exposed to the sun, use a sunscreen that protects against both UVA and UVB radiation with an SPF of 30 or greater. Reapply every 2 to 3 hours or after sweating, drying off with a towel, or swimming. · Always wear a seat belt when traveling in a car. Always wear a helmet when riding a bicycle or motorcycle.

## 2022-05-31 ENCOUNTER — OFFICE VISIT (OUTPATIENT)
Dept: PAIN MANAGEMENT | Age: 67
End: 2022-05-31
Payer: COMMERCIAL

## 2022-05-31 VITALS
OXYGEN SATURATION: 98 % | HEART RATE: 82 BPM | SYSTOLIC BLOOD PRESSURE: 144 MMHG | BODY MASS INDEX: 23.44 KG/M2 | DIASTOLIC BLOOD PRESSURE: 78 MMHG | WEIGHT: 120 LBS

## 2022-05-31 DIAGNOSIS — M79.7 FIBROMYALGIA: ICD-10-CM

## 2022-05-31 DIAGNOSIS — G89.4 CHRONIC PAIN SYNDROME: ICD-10-CM

## 2022-05-31 DIAGNOSIS — M75.81 ROTATOR CUFF TENDONITIS, RIGHT: ICD-10-CM

## 2022-05-31 DIAGNOSIS — M50.30 DDD (DEGENERATIVE DISC DISEASE), CERVICAL: ICD-10-CM

## 2022-05-31 DIAGNOSIS — M51.36 DDD (DEGENERATIVE DISC DISEASE), LUMBAR: ICD-10-CM

## 2022-05-31 DIAGNOSIS — M75.81 TENDINITIS OF RIGHT ROTATOR CUFF: ICD-10-CM

## 2022-05-31 DIAGNOSIS — J30.89 ALLERGIC RHINITIS DUE TO OTHER ALLERGIC TRIGGER, UNSPECIFIED SEASONALITY: ICD-10-CM

## 2022-05-31 DIAGNOSIS — E11.40 CHRONIC PAINFUL DIABETIC NEUROPATHY (HCC): ICD-10-CM

## 2022-05-31 PROCEDURE — 1123F ACP DISCUSS/DSCN MKR DOCD: CPT | Performed by: INTERNAL MEDICINE

## 2022-05-31 PROCEDURE — 99213 OFFICE O/P EST LOW 20 MIN: CPT | Performed by: INTERNAL MEDICINE

## 2022-05-31 PROCEDURE — 1090F PRES/ABSN URINE INCON ASSESS: CPT | Performed by: INTERNAL MEDICINE

## 2022-05-31 PROCEDURE — 3051F HG A1C>EQUAL 7.0%<8.0%: CPT | Performed by: INTERNAL MEDICINE

## 2022-05-31 PROCEDURE — 1111F DSCHRG MED/CURRENT MED MERGE: CPT | Performed by: INTERNAL MEDICINE

## 2022-05-31 PROCEDURE — G8427 DOCREV CUR MEDS BY ELIG CLIN: HCPCS | Performed by: INTERNAL MEDICINE

## 2022-05-31 PROCEDURE — 3017F COLORECTAL CA SCREEN DOC REV: CPT | Performed by: INTERNAL MEDICINE

## 2022-05-31 PROCEDURE — G8400 PT W/DXA NO RESULTS DOC: HCPCS | Performed by: INTERNAL MEDICINE

## 2022-05-31 PROCEDURE — 1036F TOBACCO NON-USER: CPT | Performed by: INTERNAL MEDICINE

## 2022-05-31 PROCEDURE — 2022F DILAT RTA XM EVC RTNOPTHY: CPT | Performed by: INTERNAL MEDICINE

## 2022-05-31 PROCEDURE — G8420 CALC BMI NORM PARAMETERS: HCPCS | Performed by: INTERNAL MEDICINE

## 2022-05-31 RX ORDER — QUETIAPINE FUMARATE 25 MG/1
25-50 TABLET, FILM COATED ORAL NIGHTLY
Qty: 60 TABLET | Refills: 1 | Status: SHIPPED | OUTPATIENT
Start: 2022-05-31 | End: 2022-06-28 | Stop reason: SDUPTHER

## 2022-05-31 RX ORDER — UBROGEPANT 50 MG/1
TABLET ORAL
Qty: 16 TABLET | Refills: 1 | Status: SHIPPED | OUTPATIENT
Start: 2022-05-31 | End: 2022-06-13 | Stop reason: SDUPTHER

## 2022-05-31 RX ORDER — TIZANIDINE 4 MG/1
2-4 TABLET ORAL 2 TIMES DAILY PRN
Qty: 60 TABLET | Refills: 1 | Status: SHIPPED | OUTPATIENT
Start: 2022-05-31 | End: 2022-06-28 | Stop reason: SDUPTHER

## 2022-05-31 RX ORDER — OXYCODONE AND ACETAMINOPHEN 7.5; 325 MG/1; MG/1
1 TABLET ORAL EVERY 6 HOURS PRN
Qty: 100 TABLET | Refills: 0 | Status: SHIPPED | OUTPATIENT
Start: 2022-05-31 | End: 2022-06-28 | Stop reason: SDUPTHER

## 2022-05-31 RX ORDER — DULOXETIN HYDROCHLORIDE 60 MG/1
60 CAPSULE, DELAYED RELEASE ORAL DAILY
Qty: 30 CAPSULE | Refills: 1 | Status: SHIPPED | OUTPATIENT
Start: 2022-05-31 | End: 2022-06-28 | Stop reason: SDUPTHER

## 2022-05-31 NOTE — TELEPHONE ENCOUNTER
Medication:   Requested Prescriptions     Pending Prescriptions Disp Refills    montelukast (SINGULAIR) 10 MG tablet [Pharmacy Med Name: MONTELUKAST SOD 10 MG TAB 10 Tablet] 27 tablet 1     Sig: TAKE 1 TABLET BY MOUTH DAILY        Last Filled:      Patient Phone Number: [phone] (home)     Last appt: 5/25/2022   Next appt: 6/6/2022    Last OARRS:   RX Monitoring 4/9/2019   Attestation The Prescription Monitoring Report for this patient was reviewed today.

## 2022-05-31 NOTE — PROGRESS NOTES
Ilana Guadalupe  1955  [id number]      HISTORY OF PRESENT ILLNESS:  Ms. Chantal Eaton is a 73 y.o. female returns for a follow up visit for pain management  She has a diagnosis of   1. Chronic pain syndrome    2. Chronic painful diabetic neuropathy ([facility]    3. Fibromyalgia    4. Rotator cuff tendonitis, right    5. Intractable migraine with aura with status migrainosus    6. Moderate episode of recurrent major depressive disorder ([facility]    7. DDD (degenerative disc disease), cervical    8. DDD (degenerative disc disease), lumbar    9. Tendinitis of right rotator cuff    10. Headache, chronic migraine without aura, intractable, with status    11. Primary insomnia    12. Positive depression screening    . She complains of pain in the Neck, low back, with radation to left arm  She rates the pain 9/10 and describes it as sharp. Current treatment regimen has helped relieve about 10% of the pain. She denies any side effects from the current pain regimen. Patient reports that since the last follow up visit the physical functioning is unchanged, family/social relationships are unchanged, mood is unchanged sleep patterns are unchanged, and that the overall functioning is unchanged. Patient denies misusing/abusing her narcotic pain medications or using any illegal drugs. There are No indicators for possible drug abuse, addiction or diversion problems. Patient reports she has been doing fair and has been compliant with her medications. She mentions she Is using Percocet 3-4 per day along with the other adjuvants. She says she is using Kathren Jackson as needed, which helps some with the headaches along with Zanaflex. She complains the pain is greater in the legs than the back. She states her blood sugar has been less than 200 range. ALLERGIES: Patients list of allergies were reviewed     MEDICATIONS: Ms. Chantal Eaton list of medications were reviewed. Her current medications are   Outpatient Medications Prior to Visit Medication Sig Dispense Refill    buPROPion (ZYBAN) 150 MG extended release tablet Take 1 tablet by mouth 2 times daily 60 tablet 5    clotrimazole-betamethasone (LOTRISONE) 1-0.05 % cream Apply topically 2 times daily.  5 g 5    amLODIPine (NORVASC) 10 MG tablet Take 1 tablet by mouth daily 90 tablet 1    Continuous Blood Gluc  (DEXCOM G6 ) DAYO Change sensor every 10 days 1 each 0    albuterol sulfate  (90 Base) MCG/ACT inhaler INHALE 2 PUFFS INTO THE LUNGS EVERY 4 HOURS AS NEEDED FOR WHEEZING OR SHORTNESS OF BREATH 54 g 5    fexofenadine (ALLEGRA) 180 MG tablet Take 1 tablet by mouth daily 30 tablet 1    montelukast (SINGULAIR) 10 MG tablet Take 1 tablet by mouth daily 30 tablet 1    ASPIRIN LOW DOSE 81 MG EC tablet TAKE 1 TABLET BY MOUTH DAILY 90 tablet 5    Continuous Blood Gluc  (FREESTYLE LISSETT 14 DAY READER) DAYO 1 each by Does not apply route 3 times daily 1 each 1    Continuous Blood Gluc Sensor (FREESTYLE LISSETT 14 DAY SENSOR) MISC 1 each by Does not apply route every 14 days 2 each 5    Continuous Blood Gluc Transmit (DEXCOM G6 TRANSMITTER) MISC 1 each by Does not apply route daily 1 each 2    Continuous Blood Gluc Sensor (DEXCOM G6 SENSOR) MISC 1 each by Does not apply route every 14 days 3 each 1    Continuous Blood Gluc  (DEXCOM G6 ) DAYO 1 each by Does not apply route daily 1 each 1    albuterol (PROVENTIL) (2.5 MG/3ML) 0.083% nebulizer solution TAKE 3 MLS BY NEBULIZATION EVERY 4 HOURS AS NEEDED FOR WHEEZING 360 mL 5    ULTICARE MINI PEN NEEDLES 31G X 6 MM MISC USE WITH INSULINS FOUR TIMES A DAY 90 each 1    metoprolol succinate (TOPROL XL) 50 MG extended release tablet Take 1 tablet by mouth nightly 90 tablet 5    insulin glargine (BASAGLAR KWIKPEN) 100 UNIT/ML injection pen Inject 8 Units into the skin 2 times daily 5 pen 3    isosorbide mononitrate (IMDUR) 60 MG extended release tablet Take 1 tablet by mouth daily 90 tablet 5    nitroGLYCERIN (NITRODUR) 0.1 MG/HR Place 1 patch onto the skin every 24 hours 30 patch 0    docusate sodium (COLACE) 100 MG capsule Take 100 mg by mouth 2 times daily      atorvastatin (LIPITOR) 80 MG tablet TAKE 1 TABLET BY MOUTH NIGHTLY 90 tablet 5    clopidogrel (PLAVIX) 75 MG tablet TAKE ONE TABLET BY MOUTH EVERY DAY 90 tablet 5    ranolazine (RANEXA) 1000 MG extended release tablet Take 1 tablet by mouth 2 times daily 60 tablet 8    linaclotide (LINZESS) 145 MCG capsule Take 1 capsule by mouth every morning (before breakfast) 30 capsule 5    omeprazole (PRILOSEC) 20 MG delayed release capsule TAKE 1 CAPSULE BY MOUTH DAILY 30 capsule 1    nitroGLYCERIN (NITROSTAT) 0.4 MG SL tablet Place 1 tablet under the tongue every 5 minutes as needed for Chest pain up to max of 3 total doses. If no relief after 1 dose, call 911. 25 tablet 3    DULoxetine (CYMBALTA) 60 MG extended release capsule Take 1 capsule by mouth daily 30 capsule 1    oxyCODONE-acetaminophen (PERCOCET) 7.5-325 MG per tablet Take 1 tablet by mouth every 6 hours as needed for Pain (max 3-4 per day) for up to 28 days. 100 tablet 0    QUEtiapine (SEROQUEL) 25 MG tablet Take 1-2 tablets by mouth nightly 60 tablet 1    tiZANidine (ZANAFLEX) 4 MG tablet Take 0.5-1 tablets by mouth 2 times daily as needed (muscle spasms) 60 tablet 1     No facility-administered medications prior to visit. REVIEW OF SYSTEMS:    Respiratory: Negative for apnea, chest tightness and shortness of breath or change in baseline breathing. PHYSICAL EXAM:   Nursing note and vitals reviewed. BP (!) 144/78   Pulse 82   Wt 120 lb (54.4 kg)   SpO2 98%   BMI 23.44 kg/m²   Constitutional: She appears well-developed and well-nourished. No acute distress. Cardiovascular: Normal rate, regular rhythm, normal heart sounds, and does not have murmur. Pulmonary/Chest: Effort normal. No respiratory distress. She does not have wheezes in the lung fields.  She has no mic.     Neurological/Psychiatric:She is alert and oriented to person, place, and time. Coordination is  normal.  Her mood isAppropriate and affect is Neutral/Euthymic(normal) . Her    IMPRESSION:   1. Chronic pain syndrome    2. Chronic painful diabetic neuropathy ([address]    3. Fibromyalgia    4. Rotator cuff tendonitis, right    5. DDD (degenerative disc disease), cervical    6. DDD (degenerative disc disease), lumbar    7. Tendinitis of right rotator cuff        PLAN:  Informed verbal consent was obtained  -ROM/Stretching exercises as advised   -She was advised to increase fluids ( 5-7  glasses of fluid daily), limit caffeine, avoid cheese products, increase dietary fiber, increase activity and exercise as tolerated and relax regularly and enjoy meals   -Continue with Percocet 3-4 per day   -Monitor blood sugar regularly, diabetic control- adv diabetic diet. Goal for fasting blood sugars around 120. Follow up with Endocrinologist/PCP also for on going management     Current Outpatient Medications   Medication Sig Dispense Refill    oxyCODONE-acetaminophen (PERCOCET) 7.5-325 MG per tablet Take 1 tablet by mouth every 6 hours as needed for Pain (max 3-4 per day) for up to 28 days. 100 tablet 0    DULoxetine (CYMBALTA) 60 MG extended release capsule Take 1 capsule by mouth daily 30 capsule 1    QUEtiapine (SEROQUEL) 25 MG tablet Take 1-2 tablets by mouth nightly 60 tablet 1    tiZANidine (ZANAFLEX) 4 MG tablet Take 0.5-1 tablets by mouth 2 times daily as needed (muscle spasms) 60 tablet 1    Ubrogepant (UBRELVY) 50 MG TABS Take 1 tablet po PRN at start of headaches, can repeat in 24 hours 16 tablet 1    buPROPion (ZYBAN) 150 MG extended release tablet Take 1 tablet by mouth 2 times daily 60 tablet 5    clotrimazole-betamethasone (LOTRISONE) 1-0.05 % cream Apply topically 2 times daily.  5 g 5    amLODIPine (NORVASC) 10 MG tablet Take 1 tablet by mouth daily 90 tablet 1    Continuous Blood Gluc  (DEXCOM G6 ) DAYO Change sensor every 10 days 1 each 0    albuterol sulfate  (90 Base) MCG/ACT inhaler INHALE 2 PUFFS INTO THE LUNGS EVERY 4 HOURS AS NEEDED FOR WHEEZING OR SHORTNESS OF BREATH 54 g 5    fexofenadine (ALLEGRA) 180 MG tablet Take 1 tablet by mouth daily 30 tablet 1    montelukast (SINGULAIR) 10 MG tablet Take 1 tablet by mouth daily 30 tablet 1    ASPIRIN LOW DOSE 81 MG EC tablet TAKE 1 TABLET BY MOUTH DAILY 90 tablet 5    Continuous Blood Gluc  (FREESTYLE LISSETT 14 DAY READER) DAYO 1 each by Does not apply route 3 times daily 1 each 1    Continuous Blood Gluc Sensor (FREESTYLE LISSETT 14 DAY SENSOR) MISC 1 each by Does not apply route every 14 days 2 each 5    Continuous Blood Gluc Transmit (DEXCOM G6 TRANSMITTER) MISC 1 each by Does not apply route daily 1 each 2    Continuous Blood Gluc Sensor (DEXCOM G6 SENSOR) MISC 1 each by Does not apply route every 14 days 3 each 1    Continuous Blood Gluc  (DEXCOM G6 ) DAYO 1 each by Does not apply route daily 1 each 1    albuterol (PROVENTIL) (2.5 MG/3ML) 0.083% nebulizer solution TAKE 3 MLS BY NEBULIZATION EVERY 4 HOURS AS NEEDED FOR WHEEZING 360 mL 5    ULTICARE MINI PEN NEEDLES 31G X 6 MM MISC USE WITH INSULINS FOUR TIMES A DAY 90 each 1    metoprolol succinate (TOPROL XL) 50 MG extended release tablet Take 1 tablet by mouth nightly 90 tablet 5    insulin glargine (BASAGLAR KWIKPEN) 100 UNIT/ML injection pen Inject 8 Units into the skin 2 times daily 5 pen 3    isosorbide mononitrate (IMDUR) 60 MG extended release tablet Take 1 tablet by mouth daily 90 tablet 5    nitroGLYCERIN (NITRODUR) 0.1 MG/HR Place 1 patch onto the skin every 24 hours 30 patch 0    docusate sodium (COLACE) 100 MG capsule Take 100 mg by mouth 2 times daily      atorvastatin (LIPITOR) 80 MG tablet TAKE 1 TABLET BY MOUTH NIGHTLY 90 tablet 5    clopidogrel (PLAVIX) 75 MG tablet TAKE ONE TABLET BY MOUTH EVERY DAY 90 tablet 5    ranolazine (RANEXA) 1000 MG extended release tablet Take 1 tablet by mouth 2 times daily 60 tablet 8    linaclotide (LINZESS) 145 MCG capsule Take 1 capsule by mouth every morning (before breakfast) 30 capsule 5    omeprazole (PRILOSEC) 20 MG delayed release capsule TAKE 1 CAPSULE BY MOUTH DAILY 30 capsule 1    nitroGLYCERIN (NITROSTAT) 0.4 MG SL tablet Place 1 tablet under the tongue every 5 minutes as needed for Chest pain up to max of 3 total doses. If no relief after 1 dose, call 911. 25 tablet 3     No current facility-administered medications for this visit. I will continue her current medication regimen  which is part of the above treatment schedule. It has been helping with Ms. Meza's chronic  medical problems which for this visit include:   Diagnoses of Chronic pain syndrome, Chronic painful diabetic neuropathy ([address] Fibromyalgia, Rotator cuff tendonitis, right, Intractable migraine with aura with status migrainosus, Moderate episode of recurrent major depressive disorder ([address] DDD (degenerative disc disease), cervical, DDD (degenerative disc disease), lumbar, Tendinitis of right rotator cuff, Headache, chronic migraine without aura, intractable, with status, Primary insomnia, and Positive depression screening were pertinent to this visit. Risks and benefits of the medications and other alternative treatments  including no treatment were discussed with the patient. The common side effects of these medications were also explained to the patient. Informed verbal consent was obtained. Goals of current treatment regimen include improvement in pain, restoration of functioning- with focus on improvement in physical performance, general activity, work or disability,emotional distress, health care utilization and  decreased medication consumption. Will continue to monitor progress towards achieving/maintaining therapeutic goals with special emphasis on  1.  Improvement in perceived interfernce  of pain with ADL's. Ability to do home exercises independently. Ability to do household chores indoor and/or outdoor work and social and leisure activities. Improve psychosocial and physical functioning. - she is showing progression towards this treatment goal with the current regimen. She was advised against drinking alcohol with the narcotic pain medicines, advised against driving or handling machinery while adjusting the dose of medicines or if having cognitive  issues related to the current medications. Risk of overdose and death, if medicines not taken as prescribed, were also discussed. If the patient develops new symptoms or if the symptoms worsen, the patient should call the office. While transcribing every attempt was made to maintain the accuracy of the note in terms of it's contents,there may have been some errors made inadvertently. Thank you for allowing me to participate in the care of this patient.     Bhavna Coffey MD.    Cc: Isra Phipps MD

## 2022-06-01 RX ORDER — MONTELUKAST SODIUM 10 MG/1
10 TABLET ORAL DAILY
Qty: 27 TABLET | Refills: 1 | Status: SHIPPED | OUTPATIENT
Start: 2022-06-01 | End: 2022-08-15

## 2022-06-02 ENCOUNTER — PATIENT MESSAGE (OUTPATIENT)
Dept: PRIMARY CARE CLINIC | Age: 67
End: 2022-06-02

## 2022-06-02 ENCOUNTER — TELEPHONE (OUTPATIENT)
Dept: PRIMARY CARE CLINIC | Age: 67
End: 2022-06-02

## 2022-06-02 DIAGNOSIS — E11.59 TYPE 2 DIABETES MELLITUS WITH OTHER CIRCULATORY COMPLICATION, WITH LONG-TERM CURRENT USE OF INSULIN (HCC): ICD-10-CM

## 2022-06-02 DIAGNOSIS — Z79.4 TYPE 2 DIABETES MELLITUS WITH OTHER CIRCULATORY COMPLICATION, WITH LONG-TERM CURRENT USE OF INSULIN (HCC): ICD-10-CM

## 2022-06-02 NOTE — TELEPHONE ENCOUNTER
Left message on machine per [employer]  Mammogram and Dexa scan orders. [facility] [phone] left on .

## 2022-06-03 RX ORDER — BLOOD-GLUCOSE SENSOR
1 EACH MISCELLANEOUS
Qty: 3 EACH | Refills: 1 | Status: SHIPPED | OUTPATIENT
Start: 2022-06-03

## 2022-06-03 NOTE — TELEPHONE ENCOUNTER
From: Do Carvajal  To: Dr. Marquis Plata: 6/2/2022 12:56 PM EDT  Subject: Something for nausea    Need something for nausea and more sensors please

## 2022-06-06 DIAGNOSIS — I10 ESSENTIAL HYPERTENSION: ICD-10-CM

## 2022-06-07 ENCOUNTER — TELEPHONE (OUTPATIENT)
Dept: PRIMARY CARE CLINIC | Age: 67
End: 2022-06-07

## 2022-06-07 RX ORDER — OMEPRAZOLE 20 MG/1
20 CAPSULE, DELAYED RELEASE ORAL DAILY
Qty: 30 CAPSULE | Refills: 1 | Status: SHIPPED | OUTPATIENT
Start: 2022-06-07 | End: 2022-06-22

## 2022-06-07 NOTE — TELEPHONE ENCOUNTER
Medication:   Requested Prescriptions     Pending Prescriptions Disp Refills    omeprazole (PRILOSEC) 20 MG delayed release capsule [Pharmacy Med Name: OMEPRAZOLE 20 MG CPDR 20 Capsule] 30 capsule 1     Sig: TAKE 1 CAPSULE BY MOUTH DAILY        Last Filled:      Patient Phone Number: [phone] (home)     Last appt: 5/25/2022   Next appt: Visit date not found    Last OARRS:   RX Monitoring 4/9/2019   Attestation The Prescription Monitoring Report for this patient was reviewed today.

## 2022-06-07 NOTE — TELEPHONE ENCOUNTER
Submitted PA for [facility] 14 Day [place] device Via CMM Key: [id number] STATUS: DENIED. Please see Denial letter attached.      \"Not a covered Part D drug by law\"

## 2022-06-10 ENCOUNTER — PATIENT MESSAGE (OUTPATIENT)
Dept: PRIMARY CARE CLINIC | Age: 67
End: 2022-06-10

## 2022-06-10 NOTE — TELEPHONE ENCOUNTER
From: Peggy Salazar  To: Dr. Sheryl Umanzor: 6/10/2022 12:17 PM EDT  Subject: Sensors    Still need the sensors and something for feeling sick

## 2022-06-13 ENCOUNTER — TELEPHONE (OUTPATIENT)
Dept: PAIN MANAGEMENT | Age: 67
End: 2022-06-13

## 2022-06-13 DIAGNOSIS — G89.4 CHRONIC PAIN SYNDROME: ICD-10-CM

## 2022-06-13 DIAGNOSIS — G43.711 HEADACHE, CHRONIC MIGRAINE WITHOUT AURA, INTRACTABLE, WITH STATUS: Primary | ICD-10-CM

## 2022-06-13 RX ORDER — UBROGEPANT 50 MG/1
TABLET ORAL
Qty: 16 TABLET | Refills: 1 | Status: SHIPPED | OUTPATIENT
Start: 2022-06-13 | End: 2022-06-28 | Stop reason: SDUPTHER

## 2022-06-14 NOTE — TELEPHONE ENCOUNTER
Caller:Chastity @ [facility]   Follow up:   Calling to follow up on the status of a fax that was sent for A PA on a Freestyle monitor. pls advise. thank you  Heber: [phone]

## 2022-06-15 ENCOUNTER — TELEPHONE (OUTPATIENT)
Dept: PRIMARY CARE CLINIC | Age: 67
End: 2022-06-15

## 2022-06-15 NOTE — TELEPHONE ENCOUNTER
Called and spoke with maria luz and advised her the PA has already been done and pt' was DENIED. Chela Miller   Faxed a copy of the denial letter to her at [phone]

## 2022-06-15 NOTE — TELEPHONE ENCOUNTER
Pt is asking if pcp can call regarding her hospital visit she had yesterday. Marguerite Mccauley they found something on her MRI that concerned her and they didn't explain it to her called neuro deficit acute stroke. . wants a callback to discuss

## 2022-06-21 RX ORDER — UBROGEPANT 50 MG/1
TABLET ORAL
Qty: 10 TABLET | Refills: 1 | OUTPATIENT
Start: 2022-06-21

## 2022-06-22 ENCOUNTER — OFFICE VISIT (OUTPATIENT)
Dept: PRIMARY CARE CLINIC | Age: 67
End: 2022-06-22
Payer: COMMERCIAL

## 2022-06-22 VITALS
WEIGHT: 119.2 LBS | OXYGEN SATURATION: 99 % | HEIGHT: 60 IN | HEART RATE: 78 BPM | DIASTOLIC BLOOD PRESSURE: 70 MMHG | SYSTOLIC BLOOD PRESSURE: 170 MMHG | BODY MASS INDEX: 23.4 KG/M2 | TEMPERATURE: 97.9 F

## 2022-06-22 DIAGNOSIS — Z09 HOSPITAL DISCHARGE FOLLOW-UP: Primary | ICD-10-CM

## 2022-06-22 DIAGNOSIS — I10 ESSENTIAL HYPERTENSION: ICD-10-CM

## 2022-06-22 DIAGNOSIS — G89.4 CHRONIC PAIN SYNDROME: ICD-10-CM

## 2022-06-22 DIAGNOSIS — G44.89 OTHER HEADACHE SYNDROME: ICD-10-CM

## 2022-06-22 PROCEDURE — 1036F TOBACCO NON-USER: CPT | Performed by: INTERNAL MEDICINE

## 2022-06-22 PROCEDURE — 1123F ACP DISCUSS/DSCN MKR DOCD: CPT | Performed by: INTERNAL MEDICINE

## 2022-06-22 PROCEDURE — G8427 DOCREV CUR MEDS BY ELIG CLIN: HCPCS | Performed by: INTERNAL MEDICINE

## 2022-06-22 PROCEDURE — 1111F DSCHRG MED/CURRENT MED MERGE: CPT | Performed by: INTERNAL MEDICINE

## 2022-06-22 PROCEDURE — G8400 PT W/DXA NO RESULTS DOC: HCPCS | Performed by: INTERNAL MEDICINE

## 2022-06-22 PROCEDURE — G8420 CALC BMI NORM PARAMETERS: HCPCS | Performed by: INTERNAL MEDICINE

## 2022-06-22 PROCEDURE — 1090F PRES/ABSN URINE INCON ASSESS: CPT | Performed by: INTERNAL MEDICINE

## 2022-06-22 PROCEDURE — 99214 OFFICE O/P EST MOD 30 MIN: CPT | Performed by: INTERNAL MEDICINE

## 2022-06-22 PROCEDURE — 3017F COLORECTAL CA SCREEN DOC REV: CPT | Performed by: INTERNAL MEDICINE

## 2022-06-22 RX ORDER — LABETALOL 200 MG/1
200 TABLET, FILM COATED ORAL 2 TIMES DAILY
Qty: 60 TABLET | Refills: 3 | Status: SHIPPED | OUTPATIENT
Start: 2022-06-22 | End: 2022-08-04 | Stop reason: SDUPTHER

## 2022-06-22 RX ORDER — OMEPRAZOLE 20 MG/1
20 CAPSULE, DELAYED RELEASE ORAL DAILY
Qty: 30 CAPSULE | Refills: 1 | Status: SHIPPED | OUTPATIENT
Start: 2022-06-22 | End: 2022-08-15

## 2022-06-22 RX ORDER — AMLODIPINE BESYLATE 10 MG/1
10 TABLET ORAL DAILY
Qty: 90 TABLET | Refills: 1 | Status: SHIPPED | OUTPATIENT
Start: 2022-06-22 | End: 2022-08-04 | Stop reason: SDUPTHER

## 2022-06-22 ASSESSMENT — ENCOUNTER SYMPTOMS
VOMITING: 0
BLOOD IN STOOL: 0
CHEST TIGHTNESS: 0
ABDOMINAL DISTENTION: 0
NAUSEA: 0
CONSTIPATION: 0
BACK PAIN: 1
ABDOMINAL PAIN: 1

## 2022-06-22 NOTE — TELEPHONE ENCOUNTER
Medication:   Requested Prescriptions     Pending Prescriptions Disp Refills    omeprazole (PRILOSEC) 20 MG delayed release capsule [Pharmacy Med Name: OMEPRAZOLE 20 MG CPDR 20 Capsule] 30 capsule 1     Sig: TAKE 1 CAPSULE BY MOUTH DAILY        Last Filled:      Patient Phone Number: [phone] (home)     Last appt: 5/25/2022   Next appt: 6/22/2022    Last OARRS:   RX Monitoring 4/9/2019   Attestation The Prescription Monitoring Report for this patient was reviewed today.

## 2022-06-22 NOTE — PROGRESS NOTES
Subjective:      Patient ID: Nawaf Malagon is a 73 y.o. female. 6/22/2022 Patient presents with: Follow-[facility], 6/13-6/14/2022, headaches  CT Head and MRI Head done   Headache  Hypertension: states she is taking meds . 5/25/2022 Patient presents with:  Medicare AWV               1/27/2022 Patient presents with: Follow-Up from Hospital: [facility]-1/9/2022 - 1/13/2022 Chest pain, [place]    .  Chest pain, ,, no further work-up recommended per cardiology    Diabetes mellitus, sliding scale    COPD, no acute exacerbation  .  A. fib, controlled.  Echo noted, discussed with Dr. Sherice Mckeon, at this time we will keep on aspirin and Plavix, she will follow-up with him as outpatient. To discuss watchman device  Renal insuff  monitor closely, improved creatinine to 1.4 this morning.    Anemia,  Chronic    Peripheral vascular disease with discoloration of second left toe, vascular surgery consulted, Doppler noted with no significant occlusive disease, echo ordered per vascular .                           10/1/2020               1/14/2020          9/24/19     8/31/19 !!!           8/29/19      hypertension, diabetes, hyperlipidemia, fibromyalgia, COPD, CKD, CHF and CAD           Review of Systems   Constitutional: Negative for activity change, fatigue and fever. Flu vac 10/21     tdap 10/16      pneumovac 10/13     Shingrex    covid vac 3/21      HENT:        No teeth     No dentures    Eyes:        Last Eye Ex 3/20   Respiratory: Negative for chest tightness. Shortness of breath: baseline. Getting urges to smoke again ! Known COPD   Cardiovascular: Negative. Known CAD with Stents . Atrial Fibrillation , S/P Watchman device 5/22   Gastrointestinal: Positive for abdominal pain. Negative for abdominal distention, blood in stool, constipation, nausea and vomiting. Upper / lower endopscopy 4/19       No Fh of ca colon .          Genitourinary: Negative for dysuria, frequency, menstrual problem, urgency and vaginal discharge. Hysterectomy   Mammogram 1/16    Musculoskeletal: Positive for arthralgias, back pain, gait problem and myalgias. Pain Disorder c/o Pain Management    Neurological: Positive for dizziness, weakness and headaches (Chronic . off and on ). Light-headedness: off and on               Hematological:           Did see Hematology for anemia . Bone Marrow exam Nl    Psychiatric/Behavioral: Positive for dysphoric mood (Remembers her son's murder > 20 yrs ago). Negative for behavioral problems and sleep disturbance. The patient is not nervous/anxious. Objective:   Physical Exam  Vitals reviewed. Constitutional:       General: She is not in acute distress. Appearance: She is not ill-appearing. Cardiovascular:      Rate and Rhythm: Regular rhythm. Heart sounds: Normal heart sounds. Pulmonary:      Breath sounds: Normal breath sounds. Musculoskeletal:      Cervical back: Neck supple. Comments:        Skin:     General: Skin is warm and dry. Neurological:      Mental Status: She is oriented to person, place, and time. Psychiatric:         Attention and Perception: Attention normal.         Mood and Affect: Mood normal.         Behavior: Behavior normal.         Assessment:     Maren was seen today for follow-up from hospital, headache and hypertension. Diagnoses and all orders for this visit:    Essential hypertension  DC Metoprolol And Imdur . Replace with labetalol for better BP control . Cont Norvasc . Low salt diet   -     amLODIPine (NORVASC) 10 MG tablet; Take 1 tablet by mouth daily  -     labetalol (NORMODYNE) 200 MG tablet; Take 1 tablet by mouth 2 times daily    Other headache syndrome    Multifactorial .   Hold Imdur . Control BP as noted above .  Get input from Nephrology too         High priority for COVID-19 vaccination  Needs third booster     Need for pneumococcal vaccination    Need for shingles vaccine  At Pharm   -     zoster recombinant adjuvanted vaccine [facility]) 50 MCG/0.5ML SUSR injection; Inject 0.5 mLs into the muscle once for 1 dose      Encounter for screening mammogram for high-risk patient  Long over due   -     [facility] DIGITAL SCREEN W OR WO CAD BILATERAL; Future    Age-related osteoporosis without current pathological fracture  -     DEXA BONE DENSITY 2 SITES; Future    Tinea pedis, left  -     clotrimazole-betamethasone (LOTRISONE) 1-0.05 % cream; Apply topically 2 times daily. Positive depression screening  Adding Wellbutrin   -     DULoxetine (CYMBALTA) 60 MG extended release capsule; Take 1 capsule by mouth daily  -     buPROPion (ZYBAN) 150 MG extended release tablet; Take 1 tablet by mouth 2 times daily        Vaginal spotting if it continues will consult Gynae. On blood thinners     Renal insufficiency  C/O nephrology       Type 2 diabetes mellitus with other circulatory complication, with long-term current use of insulin ([facility])  A1C 7.4  Eye exam needed     -     insulin glargine (BASAGLAR KWIKPEN) 100 UNIT/ML injection pen; Inject 8 Units into the skin 2 times daily  -    Gait abnormality  Advised to use cane while walking             Neuropathy    Chronic pain syndrome in Pain management . Coronary artery disease involving native coronary artery of native heart without angina pectoris  -    Plavix 75 mg / d    metoprolol succinate (TOPROL XL) 50 MG extended release tablet; Take 0.5 tablets by mouth daily  -     RANEXA 1000 MG extended release tablet; Take 1 tablet by mouth 2 times daily  -     atorvastatin (LIPITOR) 80 MG tablet; Take 1 tablet by mouth daily  -     aspirin (ASPIRIN LOW DOSE) 81 MG EC tablet; Take 1 tablet by mouth daily  -     isosorbide mononitrate (IMDUR) 30 MG extended release tablet;  Take 1 tablet by mouth daily  -     nitroGLYCERIN (NITROSTAT) 0.4 MG SL tablet; Place 1 tablet under the tongue every 5 minutes as needed for Chest pain up to max of 3 total doses. If no relief after 1 dose, call 911. Diabetic gastroparesis (HCC)  Control sugars .        Other hyperlipidemia  80 mg Lipitor             Anxiety  and Depression     cymbalta per Pain Dr   Plan:

## 2022-06-23 RX ORDER — BUTALBITAL, ACETAMINOPHEN AND CAFFEINE 50; 325; 40 MG/1; MG/1; MG/1
1 TABLET ORAL EVERY 6 HOURS PRN
Qty: 30 TABLET | Refills: 0 | Status: SHIPPED | OUTPATIENT
Start: 2022-06-23 | End: 2022-06-24

## 2022-06-24 ENCOUNTER — HOSPITAL ENCOUNTER (EMERGENCY)
Age: 67
Discharge: HOME | End: 2022-06-24
Attending: EMERGENCY MEDICINE
Payer: COMMERCIAL

## 2022-06-24 ENCOUNTER — TELEPHONE (OUTPATIENT)
Dept: PRIMARY CARE CLINIC | Age: 67
End: 2022-06-24

## 2022-06-24 VITALS
SYSTOLIC BLOOD PRESSURE: 183 MMHG | HEIGHT: 60 IN | OXYGEN SATURATION: 100 % | BODY MASS INDEX: 23.63 KG/M2 | WEIGHT: 120.37 LBS | RESPIRATION RATE: 18 BRPM | DIASTOLIC BLOOD PRESSURE: 64 MMHG | TEMPERATURE: 98.3 F | HEART RATE: 66 BPM

## 2022-06-24 DIAGNOSIS — R51.9 ACUTE NONINTRACTABLE HEADACHE, UNSPECIFIED HEADACHE TYPE: Primary | ICD-10-CM

## 2022-06-24 DIAGNOSIS — G44.89 OTHER HEADACHE SYNDROME: Primary | ICD-10-CM

## 2022-06-24 PROCEDURE — 6360000002 HC RX W HCPCS: Performed by: EMERGENCY MEDICINE

## 2022-06-24 PROCEDURE — 2580000003 HC RX 258: Performed by: EMERGENCY MEDICINE

## 2022-06-24 PROCEDURE — 99284 EMERGENCY DEPT VISIT MOD MDM: CPT

## 2022-06-24 PROCEDURE — 96374 THER/PROPH/DIAG INJ IV PUSH: CPT

## 2022-06-24 PROCEDURE — 96375 TX/PRO/DX INJ NEW DRUG ADDON: CPT

## 2022-06-24 RX ORDER — 0.9 % SODIUM CHLORIDE 0.9 %
500 INTRAVENOUS SOLUTION INTRAVENOUS ONCE
Status: COMPLETED | OUTPATIENT
Start: 2022-06-24 | End: 2022-06-24

## 2022-06-24 RX ORDER — PROCHLORPERAZINE EDISYLATE 5 MG/ML
10 INJECTION INTRAMUSCULAR; INTRAVENOUS ONCE
Status: COMPLETED | OUTPATIENT
Start: 2022-06-24 | End: 2022-06-24

## 2022-06-24 RX ORDER — DIPHENHYDRAMINE HYDROCHLORIDE 50 MG/ML
12.5 INJECTION INTRAMUSCULAR; INTRAVENOUS ONCE
Status: COMPLETED | OUTPATIENT
Start: 2022-06-24 | End: 2022-06-24

## 2022-06-24 RX ADMIN — SODIUM CHLORIDE 500 ML: 9 INJECTION, SOLUTION INTRAVENOUS at 17:14

## 2022-06-24 RX ADMIN — DIPHENHYDRAMINE HYDROCHLORIDE 12.5 MG: 50 INJECTION, SOLUTION INTRAMUSCULAR; INTRAVENOUS at 17:10

## 2022-06-24 RX ADMIN — PROCHLORPERAZINE EDISYLATE 10 MG: 5 INJECTION INTRAMUSCULAR; INTRAVENOUS at 17:09

## 2022-06-24 ASSESSMENT — ENCOUNTER SYMPTOMS
NAUSEA: 1
ABDOMINAL PAIN: 0
SORE THROAT: 0
PHOTOPHOBIA: 0
EYE REDNESS: 0
SHORTNESS OF BREATH: 0
RHINORRHEA: 0
VOMITING: 0

## 2022-06-24 ASSESSMENT — PAIN DESCRIPTION - DESCRIPTORS: DESCRIPTORS: POUNDING

## 2022-06-24 ASSESSMENT — PAIN SCALES - GENERAL
PAINLEVEL_OUTOF10: 8
PAINLEVEL_OUTOF10: 10
PAINLEVEL_OUTOF10: 5
PAINLEVEL_OUTOF10: 6

## 2022-06-24 ASSESSMENT — PAIN DESCRIPTION - LOCATION: LOCATION: HEAD

## 2022-06-24 NOTE — ED PROVIDER NOTES
[address] [facility]  eMERGENCY dEPARTMENT eNCOUnter      Pt Name: Olayinka Mccarthy  MRN: [id number]  [place] 1955  Date of evaluation: 6/24/2022  Provider: Omayra Nails MD    CHIEF COMPLAINT       Chief Complaint   Patient presents with    Headache     Right sided headache. 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 Patient states she has been having headaches for a few months and is scheduled to have tumor removed but states pain became unbearable 2 hours ago. Erven Garner states she took her regular percocet and ibuprofen at 0800. HISTORY OF PRESENT ILLNESS   (Location/Symptom, Timing/Onset,Context/Setting, Quality, Duration, Modifying Factors, Severity)  Note limiting factors. Olayinka Mccarthy is a 73 y.o. female who presents to the emergency department planing of a headache. The patient has a past medical history of migraine headaches. She states she has been having headaches coming and going for the past few months and in fact is pretty much had a constant headache for many days. That headache was gradual in onset. Not worst headache of life. It did worsen 2 hours ago and she currently rates it a 10 out of 10. She took her abortive medication earlier without relief. No fever. No chills. No history of trauma. No altered mental status. She has had prior TIAs and strokes which is left her with some residual numbness and weakness on the right side which is unchanged. No confusion. Of note the patient recently underwent a work-up for this long-duration headache of a few months. This work-up consisted of a CT of the head and neck without contrast.  She also underwent a CT angiogram of the head and neck with contrast.  She had some focal stenosis/embolus versus artifact at the mid P2 segment of the left posterior cerebral artery. There is no other significant abnormality. She also underwent an MRI and an MRA of her brain.   The MRA of her head Did not reveal any evidence of a large vessel occlusion or significant intracranial stenosis. The patient's MRI showed a small left 7 mm meningioma adjacent to the anterior aspect of the superior temporal gyrus. HPI    NursingNotes were reviewed. REVIEW OF SYSTEMS    (2-9 systems for level 4, 10 or more for level 5)     Review of Systems   Constitutional: Negative for fever. HENT: Negative for rhinorrhea and sore throat. Eyes: Negative for photophobia and redness. Respiratory: Negative for shortness of breath. Cardiovascular: Negative for chest pain. Gastrointestinal: Positive for nausea. Negative for abdominal pain and vomiting. Genitourinary: Negative for flank pain. Musculoskeletal: Negative for neck pain. Neurological: Positive for numbness and headaches. Negative for seizures and speech difficulty. Chronic right-sided numbness from prior TIA/CVA. No change. Hematological: Negative for adenopathy. Psychiatric/Behavioral: Negative for confusion. Except as noted above the remainder of the review of systems was reviewed and negative.        PAST MEDICAL HISTORY     Past Medical History:   Diagnosis Date    Acid reflux     Anemia     Anxiety and depression     Arthritis     Asthma     Atrial fibrillation ([facility]     CAD (coronary artery disease) 12/3/2012    Cerebral artery occlusion with cerebral infarction [facility])     TIA\"\"S--right sided weakness & headache    CHF (congestive heart failure) (HCC)     Chronic kidney disease--stage III     40% kidney function    COPD (chronic obstructive pulmonary disease) ([facility]     DM2 (diabetes mellitus, type 2) ([facility]     Dysarthria     Fibromyalgia 6/7/2016    Headache(784.0) 2/19/2013    Hemisensory loss     History of blood transfusion 11/2020    pt denies having transfusion reaction    Hyperlipidemia     Hypertension     IBS (irritable bowel syndrome)     Inferior vena cava occlusion (HCC)     Keratitis     Meningioma ([facility]     MI, old     Neuropathy     Superior vena cava obstruction     Temporal arteritis ([facility] [address] [facility] 2/24/2014    Wears glasses          SURGICALHISTORY       Past Surgical History:   Procedure Laterality Date    ABLATION OF DYSRHYTHMIC FOCUS  1999  and 11/2020    times 2    ARTERY BIOPSY Right 04/23/2014    RIGHT TEMPORAL ARTERY BIOPSY    CAROTID ARTERY SURGERY Left     clean up per pt    CATARACT REMOVAL Bilateral     CHOLECYSTECTOMY      COLONOSCOPY N/A 4/9/2021    COLONOSCOPY WITH BIOPSY performed by Chloe Henriquez MD at [address] [facility] 10/15/2021    COLONOSCOPY performed by Chloe Henriquez MD at [address]    HYSTERECTOMY (CERVIX STATUS UNKNOWN)      JOINT REPLACEMENT Right     KNEE ARTHROSCOPY Right     PTCA  10/2019    LAD and RCA inrtervention    TUNNELED VENOUS PORT PLACEMENT      left thigh. SMART PORT-----Removed--total of 4 port placement and removal    UPPER GASTROINTESTINAL ENDOSCOPY N/A 7/6/2020    EGD DIAGNOSTIC ONLY performed by Reba Kunz MD at [address] [facility]       Discharge Medication List as of 6/24/2022  6:44 PM      CONTINUE these medications which have NOT CHANGED    Details   Isometheptene-Dichloral-APAP (MIDRIN) -325 MG CAPS Take 1 capsule by mouth See Admin Instructions for 10 days. Tid prn, Disp-30 capsule, R-0Normal      omeprazole (PRILOSEC) 20 MG delayed release capsule TAKE 1 CAPSULE BY MOUTH DAILY, Disp-30 capsule, R-1Normal      amLODIPine (NORVASC) 10 MG tablet Take 1 tablet by mouth daily, Disp-90 tablet, R-1Normal      labetalol (NORMODYNE) 200 MG tablet Take 1 tablet by mouth 2 times daily, Disp-60 tablet, R-3Normal      Ubrogepant (UBRELVY) 50 MG TABS Take 1 tablet po PRN at start of headaches, can repeat in 24 hours, Disp-16 tablet, R-1Normal      !!  Continuous Blood Gluc Sensor (DEXCOM G6 SENSOR) MISC 1 each by Does not apply route every 14 days, Disp-3 each, R-1Normal      montelukast (SINGULAIR) 10 MG tablet TAKE 1 TABLET BY MOUTH DAILY, Disp-27 tablet, R-1Normal      oxyCODONE-acetaminophen (PERCOCET) 7.5-325 MG per tablet Take 1 tablet by mouth every 6 hours as needed for Pain (max 3-4 per day) for up to 28 days. , Disp-100 tablet, R-0Print      DULoxetine (CYMBALTA) 60 MG extended release capsule Take 1 capsule by mouth daily, Disp-30 capsule, R-1Normal      QUEtiapine (SEROQUEL) 25 MG tablet Take 1-2 tablets by mouth nightly, Disp-60 tablet, R-1Normal      tiZANidine (ZANAFLEX) 4 MG tablet Take 0.5-1 tablets by mouth 2 times daily as needed (muscle spasms), Disp-60 tablet, R-1Normal      buPROPion (ZYBAN) 150 MG extended release tablet Take 1 tablet by mouth 2 times daily, Disp-60 tablet, R-5Normal      clotrimazole-betamethasone (LOTRISONE) 1-0.05 % cream Apply topically 2 times daily. , Disp-5 g, R-5, Normal      !! Continuous Blood Gluc  (DEXCOM G6 ) DAYO Change sensor every 10 days, Disp-1 each, R-01 kit Lot [id number] Exp 6/24/2022Sample      albuterol sulfate  (90 Base) MCG/ACT inhaler INHALE 2 PUFFS INTO THE LUNGS EVERY 4 HOURS AS NEEDED FOR WHEEZING OR SHORTNESS OF BREATH, Disp-54 g, R-5Normal      ASPIRIN LOW DOSE 81 MG EC tablet TAKE 1 TABLET BY MOUTH DAILY, Disp-90 tablet, R-5Normal      !! Continuous Blood Gluc  (FREESTYLE LISSETT 14 DAY READER) DAYO 1 each by Does not apply route 3 times daily, Disp-1 each, R-1Normal      !! Continuous Blood Gluc Sensor (FREESTYLE LISSETT 14 DAY SENSOR) MISC 1 each by Does not apply route every 14 days, Disp-2 each, R-5Normal      Continuous Blood Gluc Transmit (DEXCOM G6 TRANSMITTER) MISC 1 each by Does not apply route daily, Disp-1 each, R-2Normal      !!  Continuous Blood Gluc  (DEXCOM G6 ) DAYO 1 each by Does not apply route daily, Disp-1 each, R-1Normal      albuterol (PROVENTIL) (2.5 MG/3ML) 0.083% nebulizer solution TAKE 3 MLS BY NEBULIZATION EVERY 4 HOURS AS NEEDED FOR WHEEZING, Disp-360 mL, R-5Normal      ULTICARE MINI PEN NEEDLES 31G X 6 MM MISC Disp-90 each, R-1, Normal      metoprolol succinate (TOPROL XL) 50 MG extended release tablet Take 1 tablet by mouth nightly, Disp-90 tablet, R-5Normal      insulin glargine (BASAGLAR KWIKPEN) 100 UNIT/ML injection pen Inject 8 Units into the skin 2 times daily, Disp-5 pen, R-3Normal      isosorbide mononitrate (IMDUR) 60 MG extended release tablet Take 1 tablet by mouth daily, Disp-90 tablet, R-5Normal      nitroGLYCERIN (NITRODUR) 0.1 MG/HR Place 1 patch onto the skin every 24 hours, Disp-30 patch, R-0Normal      docusate sodium (COLACE) 100 MG capsule Take 100 mg by mouth 2 times dailyHistorical Med      atorvastatin (LIPITOR) 80 MG tablet TAKE 1 TABLET BY MOUTH NIGHTLY, Disp-90 tablet, R-5Normal      clopidogrel (PLAVIX) 75 MG tablet TAKE ONE TABLET BY MOUTH EVERY DAY, Disp-90 tablet, R-5Normal      ranolazine (RANEXA) 1000 MG extended release tablet Take 1 tablet by mouth 2 times daily, Disp-60 tablet, R-8Normal      linaclotide (LINZESS) 145 MCG capsule Take 1 capsule by mouth every morning (before breakfast), Disp-30 capsule, R-5Normal      nitroGLYCERIN (NITROSTAT) 0.4 MG SL tablet Place 1 tablet under the tongue every 5 minutes as needed for Chest pain up to max of 3 total doses. If no relief after 1 dose, call 911., Disp-25 tablet, R-3Normal       !! - Potential duplicate medications found. Please discuss with provider. ALLERGIES     Patient has no known allergies. FAMILY HISTORY       Family History   Problem Relation Age of Onset    Diabetes Mother     High Cholesterol Mother     Stroke Mother     Cancer Mother     High Blood Pressure Mother     No Known Problems Paternal Grandfather         lung issues           SOCIAL HISTORY       Social History     Socioeconomic History    Marital status:       Spouse name: None    Number of children: 8    Years of education: None    Highest education level: None   Occupational History    None   Tobacco Use    Smoking status: Former Smoker     Packs/day: 0.50     Years: 35.00     Pack years: 17.50     Types: Cigarettes     Quit date: 2018     Years since quittin.0    Smokeless tobacco: Never Used    Tobacco comment: 5/13/15 has not smoked since hospitalization - kh   Vaping Use    Vaping Use: Never used   Substance and Sexual Activity    Alcohol use: No     Alcohol/week: 0.0 standard drinks    Drug use: Never    Sexual activity: Not Currently     Partners: Male   Other Topics Concern    None   Social History Narrative    None     Social Determinants of Health     Financial Resource Strain: Low Risk     Difficulty of Paying Living Expenses: Not hard at all   Food Insecurity: No Food Insecurity    Worried About Running Out of Food in the Last Year: Never true    Comfort of Food in the Last Year: Never true   Transportation Needs:     Lack of Transportation (Medical): Not on file    Lack of Transportation (Non-Medical):  Not on file   Physical Activity: Insufficiently Active    Days of Exercise per Week: 7 days    Minutes of Exercise per Session: 10 min   Stress:     Feeling of Stress : Not on file   Social Connections:     Frequency of Communication with Friends and Family: Not on file    Frequency of Social Gatherings with Friends and Family: Not on file    Attends [employer] Services: Not on file    Active Member of Clubs or Organizations: Not on file    Attends Club or Organization Meetings: Not on file    Marital Status: Not on file   Intimate Partner Violence:     Fear of Current or Ex-Partner: Not on file    Emotionally Abused: Not on file    Physically Abused: Not on file    Sexually Abused: Not on file   Housing Stability:     Unable to Pay for Housing in the Last Year: Not on file    Number of [place] in the Last Year: Not on file    Unstable Housing in the Last Year: Not on file       SCREENINGS Cande Coma Scale  Eye Opening: Spontaneous  Best Verbal Response: Oriented  Best Motor Response: Obeys commands  Cande Coma Scale Score: 15        PHYSICAL EXAM    (up to 7 for level 4, 8 or more for level 5)     ED Triage Vitals [06/24/22 1608]   BP Temp Temp Source Heart Rate Resp SpO2 Height Weight   (!) 201/70 98.3 °F (36.8 °C) Oral 75 16 100 % 5' (1.524 m) 120 lb 5.9 oz (54.6 kg)       Physical Exam  Vitals and nursing note reviewed. Constitutional:       Appearance: She is well-developed. She is not diaphoretic. HENT:      Head: Normocephalic and atraumatic. Mouth/Throat:      Mouth: Mucous membranes are moist.   Eyes:      General:         Right eye: No discharge. Left eye: No discharge. Conjunctiva/sclera: Conjunctivae normal.   Neck:      Comments: No meningismus  Cardiovascular:      Rate and Rhythm: Normal rate. Pulses: Normal pulses. Heart sounds: No murmur heard. Pulmonary:      Effort: Pulmonary effort is normal. No respiratory distress. Breath sounds: Normal breath sounds. No wheezing, rhonchi or rales. Abdominal:      Palpations: Abdomen is soft. Tenderness: There is no abdominal tenderness. There is no guarding or rebound. Musculoskeletal:         General: Normal range of motion. Cervical back: Neck supple. Skin:     General: Skin is warm and dry. Neurological:      Mental Status: She is alert and oriented to person, place, and time. Comments: No arm drift. No leg drift. No facial asymmetry. Normal speech and thought. Oriented x4. Slight decrease in strength in the right upper extremity versus left upper extremity. Decreased plantarflexion on the right versus left. Decree sensation to light touch of the right upper and right lower extremity. The patient states his changes are chronic.    Psychiatric:         Behavior: Behavior normal.         DIAGNOSTIC RESULTS     EKG: All EKG's are interpreted by the Emergency Department Physician who either signs or Co-signsthis chart in the absence of a cardiologist.        RADIOLOGY:   [facility] such as CT, Ultrasound and MRI are read by the radiologist. Plain radiographic images are visualized and preliminarily interpreted by the emergency physician with the below findings:        Interpretation per the Radiologist below, if available at the time ofthis note:    No orders to display         ED BEDSIDE ULTRASOUND:   Performed by ED Physician - none    LABS:  Labs Reviewed - No data to display    All other labs were within normal range or not returned as of this dictation. EMERGENCY DEPARTMENT COURSE and DIFFERENTIAL DIAGNOSIS/MDM:   Vitals:    Vitals:    06/24/22 1608 06/24/22 1615 06/24/22 1730 06/24/22 1845   BP: (!) 201/70 (!) 184/79  (!) 183/64   Pulse: 75  69 66   Resp: 16  18 18   Temp: 98.3 °F (36.8 °C)      TempSrc: Oral      SpO2: 100%  100% 100%   Weight: 120 lb 5.9 oz (54.6 kg)      Height: 5' (1.524 m)              MDM  The patient was seen and evaluated. She has no fever, no neck stiffness, no rash, no meningismus. At this time I do not suspect meningitis. She has no history of trauma and a nonfocal neurologic exam. Based on the history and physical exam, there is a lack of evidence for meningitis, SAH, or dural sinus thrombosis. While I cannot completely exclude these entities without further workup, the likelihood that any of these disease entities is the cause of patient's headache today is so low that further testing is not justified at this time. I discussed this with the patient and the patient is in agreement that further testing at this time is highly unlikely to be beneficial.  I treated the patient's headache with fluids and medication. On reassessment, patient's headache is improved to 4 out of 10 and she feels much improved. Her IV did infiltrate. Is this patient to be included in the SEP-1 Core Measure?   No   Exclusion criteria - the patient is NOT to be included for SEP-1 Core Measure due to: Infection is not suspected     CRITICAL CARE TIME   Total Critical Care time was 0 minutes, excluding separately reportable procedures. There was a high probability of clinically significant/life threatening deterioration in the patient's condition which required my urgent intervention. CONSULTS:  None    PROCEDURES:  Unless otherwise noted below, none     Procedures    FINAL IMPRESSION      1.  Acute nonintractable headache, unspecified headache type          DISPOSITION/PLAN   DISPOSITION        PATIENT REFERRED TO:  Yoanna Stack MD  [address] [facility]  [phone]    Schedule an appointment as soon as possible for a visit in 1 week        DISCHARGE MEDICATIONS:  Discharge Medication List as of 6/24/2022  6:44 PM             (Please note that portions of this note were completed with a voice recognition program.Efforts were made to edit the dictations but occasionally words are mis-transcribed.)    Brent Silva MD (electronically signed)  Attending Emergency Physician         Brent Silva MD  07/02/22 5555

## 2022-06-24 NOTE — ED NOTES
Patient notified that they should not drive after receiving benadryl and compazine due to potential for drowsiness. Patient verbalizes understanding. Patient discharged to lobby to await ride.      Darin Sewell RN  06/24/22 2947

## 2022-06-24 NOTE — TELEPHONE ENCOUNTER
Per Nadia Handler at [facility] ([address]) [id number] MG CAPS     Is not available, they are not able to get that- please send an alternative medication

## 2022-06-24 NOTE — ED NOTES
Patient calls out reporting pain at IV site. This RN to bedside to evaluate, minor swelling and bruising noted above IV site. IV fluids paused. Patient states she does not wish to be stuck again. Patient has received 440mL of the 500mL bolus ordered. Patient states pain is currently 5/10 and states she feels it is manageable. MD notified.      Alicia Zepeda RN  06/24/22 0755

## 2022-06-24 NOTE — ED NOTES
Patient ambulatory from ED. AVS provided and discussed with patient. All questions answered. Patient verbalizes understanding of discharge instructions. Respirations even and easy. No obvious distress at this time. MD aware of HTN at discharge, states ok for discharge at this time.      Nikko Gamboa RN  06/24/22 1084

## 2022-06-28 ENCOUNTER — TELEPHONE (OUTPATIENT)
Dept: CARDIOLOGY CLINIC | Age: 67
End: 2022-06-28

## 2022-06-28 ENCOUNTER — OFFICE VISIT (OUTPATIENT)
Dept: PAIN MANAGEMENT | Age: 67
End: 2022-06-28
Payer: COMMERCIAL

## 2022-06-28 VITALS
HEIGHT: 60 IN | WEIGHT: 125 LBS | HEART RATE: 67 BPM | OXYGEN SATURATION: 76 % | BODY MASS INDEX: 24.54 KG/M2 | SYSTOLIC BLOOD PRESSURE: 125 MMHG | DIASTOLIC BLOOD PRESSURE: 67 MMHG

## 2022-06-28 DIAGNOSIS — Z01.818 PRE-OP TESTING: ICD-10-CM

## 2022-06-28 DIAGNOSIS — K21.9 GASTRO-ESOPHAGEAL REFLUX DISEASE WITHOUT ESOPHAGITIS: ICD-10-CM

## 2022-06-28 DIAGNOSIS — G43.119 INTRACTABLE MIGRAINE WITH AURA WITHOUT STATUS MIGRAINOSUS: ICD-10-CM

## 2022-06-28 DIAGNOSIS — M51.36 DDD (DEGENERATIVE DISC DISEASE), LUMBAR: ICD-10-CM

## 2022-06-28 DIAGNOSIS — G43.711 HEADACHE, CHRONIC MIGRAINE WITHOUT AURA, INTRACTABLE, WITH STATUS: ICD-10-CM

## 2022-06-28 DIAGNOSIS — M75.81 ROTATOR CUFF TENDONITIS, RIGHT: ICD-10-CM

## 2022-06-28 DIAGNOSIS — F51.01 PRIMARY INSOMNIA: ICD-10-CM

## 2022-06-28 DIAGNOSIS — G89.4 CHRONIC PAIN SYNDROME: ICD-10-CM

## 2022-06-28 DIAGNOSIS — I48.0 PAROXYSMAL A-FIB (HCC): Primary | ICD-10-CM

## 2022-06-28 DIAGNOSIS — F33.1 MODERATE EPISODE OF RECURRENT MAJOR DEPRESSIVE DISORDER (HCC): ICD-10-CM

## 2022-06-28 DIAGNOSIS — E11.40 CHRONIC PAINFUL DIABETIC NEUROPATHY (HCC): ICD-10-CM

## 2022-06-28 DIAGNOSIS — M79.7 FIBROMYALGIA: ICD-10-CM

## 2022-06-28 DIAGNOSIS — Z01.818 PRE-OP TESTING: Primary | ICD-10-CM

## 2022-06-28 DIAGNOSIS — M75.81 TENDINITIS OF RIGHT ROTATOR CUFF: ICD-10-CM

## 2022-06-28 DIAGNOSIS — M50.30 DDD (DEGENERATIVE DISC DISEASE), CERVICAL: ICD-10-CM

## 2022-06-28 LAB
ANION GAP SERPL CALCULATED.3IONS-SCNC: 18 MMOL/L (ref 3–16)
BUN BLDV-MCNC: 35 MG/DL (ref 7–20)
CALCIUM SERPL-MCNC: 9.8 MG/DL (ref 8.3–10.6)
CHLORIDE BLD-SCNC: 101 MMOL/L (ref 99–110)
CO2: 17 MMOL/L (ref 21–32)
CREAT SERPL-MCNC: 1.3 MG/DL (ref 0.6–1.2)
GFR AFRICAN AMERICAN: 50
GFR NON-AFRICAN AMERICAN: 41
GLUCOSE BLD-MCNC: 226 MG/DL (ref 70–99)
HCT VFR BLD CALC: 31.6 % (ref 36–48)
HEMOGLOBIN: 10.4 G/DL (ref 12–16)
MCH RBC QN AUTO: 31.5 PG (ref 26–34)
MCHC RBC AUTO-ENTMCNC: 33 G/DL (ref 31–36)
MCV RBC AUTO: 95.7 FL (ref 80–100)
PDW BLD-RTO: 15.6 % (ref 12.4–15.4)
PLATELET # BLD: 212 K/UL (ref 135–450)
PMV BLD AUTO: 8.1 FL (ref 5–10.5)
POTASSIUM SERPL-SCNC: 4.8 MMOL/L (ref 3.5–5.1)
RBC # BLD: 3.3 M/UL (ref 4–5.2)
SODIUM BLD-SCNC: 136 MMOL/L (ref 136–145)
WBC # BLD: 5.5 K/UL (ref 4–11)

## 2022-06-28 PROCEDURE — 1090F PRES/ABSN URINE INCON ASSESS: CPT | Performed by: INTERNAL MEDICINE

## 2022-06-28 PROCEDURE — 2022F DILAT RTA XM EVC RTNOPTHY: CPT | Performed by: INTERNAL MEDICINE

## 2022-06-28 PROCEDURE — G8427 DOCREV CUR MEDS BY ELIG CLIN: HCPCS | Performed by: INTERNAL MEDICINE

## 2022-06-28 PROCEDURE — 1036F TOBACCO NON-USER: CPT | Performed by: INTERNAL MEDICINE

## 2022-06-28 PROCEDURE — 3051F HG A1C>EQUAL 7.0%<8.0%: CPT | Performed by: INTERNAL MEDICINE

## 2022-06-28 PROCEDURE — G8420 CALC BMI NORM PARAMETERS: HCPCS | Performed by: INTERNAL MEDICINE

## 2022-06-28 PROCEDURE — G8400 PT W/DXA NO RESULTS DOC: HCPCS | Performed by: INTERNAL MEDICINE

## 2022-06-28 PROCEDURE — 3017F COLORECTAL CA SCREEN DOC REV: CPT | Performed by: INTERNAL MEDICINE

## 2022-06-28 PROCEDURE — 99214 OFFICE O/P EST MOD 30 MIN: CPT | Performed by: INTERNAL MEDICINE

## 2022-06-28 PROCEDURE — 1123F ACP DISCUSS/DSCN MKR DOCD: CPT | Performed by: INTERNAL MEDICINE

## 2022-06-28 RX ORDER — TIZANIDINE 4 MG/1
2-4 TABLET ORAL 2 TIMES DAILY PRN
Qty: 60 TABLET | Refills: 1 | Status: SHIPPED | OUTPATIENT
Start: 2022-06-28 | End: 2022-07-26 | Stop reason: SDUPTHER

## 2022-06-28 RX ORDER — DULOXETIN HYDROCHLORIDE 60 MG/1
60 CAPSULE, DELAYED RELEASE ORAL DAILY
Qty: 30 CAPSULE | Refills: 1 | Status: SHIPPED | OUTPATIENT
Start: 2022-06-28 | End: 2022-07-26 | Stop reason: SDUPTHER

## 2022-06-28 RX ORDER — OXYCODONE AND ACETAMINOPHEN 7.5; 325 MG/1; MG/1
1 TABLET ORAL EVERY 6 HOURS PRN
Qty: 100 TABLET | Refills: 0 | Status: SHIPPED | OUTPATIENT
Start: 2022-06-28 | End: 2022-07-26 | Stop reason: SDUPTHER

## 2022-06-28 RX ORDER — QUETIAPINE FUMARATE 25 MG/1
25-50 TABLET, FILM COATED ORAL NIGHTLY
Qty: 60 TABLET | Refills: 1 | OUTPATIENT
Start: 2022-06-28

## 2022-06-28 RX ORDER — GALCANEZUMAB 120 MG/ML
2 INJECTION, SOLUTION SUBCUTANEOUS
Qty: 2 PEN | Refills: 1 | Status: SHIPPED | OUTPATIENT
Start: 2022-06-28 | End: 2022-07-26 | Stop reason: SDUPTHER

## 2022-06-28 RX ORDER — UBROGEPANT 50 MG/1
TABLET ORAL
Qty: 16 TABLET | Refills: 1 | Status: SHIPPED | OUTPATIENT
Start: 2022-06-28 | End: 2022-07-26 | Stop reason: SDUPTHER

## 2022-06-28 RX ORDER — QUETIAPINE FUMARATE 25 MG/1
25-50 TABLET, FILM COATED ORAL NIGHTLY
Qty: 60 TABLET | Refills: 1 | Status: SHIPPED | OUTPATIENT
Start: 2022-06-28 | End: 2022-07-26 | Stop reason: SDUPTHER

## 2022-06-28 NOTE — PROGRESS NOTES
Angela Cabrera  1955  [id number]    HISTORY OF PRESENT ILLNESS:  Ms. Nicole Rivera is a 73 y.o. female returns for a follow up visit for multiple medical problems. Her  presenting problems are   1. Chronic pain syndrome    2. Fibromyalgia    3. DDD (degenerative disc disease), lumbar    4. Primary insomnia    5. Gastro-esophageal reflux disease without esophagitis    6. Headache, chronic migraine without aura, intractable, with status    7. Rotator cuff tendonitis, right    8. Tendinitis of right rotator cuff    9. Moderate episode of recurrent major depressive disorder ([address]    10. Chronic painful diabetic neuropathy ([facility] [address]    11. DDD (degenerative disc disease), cervical    12. Intractable migraine with aura without status migrainosus    . As per information/history obtained from the PADT(patient assessment and documentation tool) -  She complains of pain in the head, neck, elbows Left, lower back and knees Right with radiation to the arms Left, hands Left, hips Right, upper leg Right, lower leg Right, ankles Right and feet Right She rates the pain 10/10 and describes it as sharp, pins and needles. Pain is made worse by: nothing, movement, walking, standing, sitting, bending, lifting. Current treatment regimen has helped relieve about 0% of the pain. She denies side effects from the current pain regimen. Patient reports that since the last follow up visit the physical functioning is worse, family/social relationships are worse, mood is worse and sleep patterns are worse, and that the overall functioning is worse. Patient denies neurological bowel or bladder. Patient denies misusing/abusing her narcotic pain medications or using any illegal drugs. There are No indicators for possible drug abuse, addiction or diversion problems. Upon obtaining the medical history from Ms. Nicole Rivera regarding today's office visit for her presenting problems, patient states she was diagnosis with a tumor on left side of brain, possible meningioma, she states she does not need surgery. Ms. Parisa Cohen states she had to go to ER due too headaches, she has been having excruciating headaches, she says she is using [place] [facility] [place] which helps some and on Zanaflex also. Patient states her sleep is fair. Has fairly normal sleep latency. Averages about 4-6 hours of sleep a night. Denies any signs of sleep apnea. Feels somewhat rested in the morning, she is on Seroquel. Patient's  subjective report of her mood is fair. she describes occasional symptoms of depression, occasional  irritability and some mood swings. Describes her mood as being neutral and reports some pleasure in her daily activities. Reports  fair  appetite, energy and concentration. Able to function well in different aspects of her daily activities. Denies suicidal or homicidal ideation. Denies any complaints of increased tension, does   Worry sometimes and occasional  irritability  she denies any c/o increased anxiety, No c/o panic attacks or symptoms of PTSD, she is on Cymbalta 60 mg. ALLERGIES/PAST MED/FAM/SOC HISTORY: Ms. Parisa Cohen allergies, past medical, family and social history were reviewed in the chart.     Ms. Parisa Cohen current medications are   Outpatient Medications Prior to Visit   Medication Sig Dispense Refill    omeprazole (PRILOSEC) 20 MG delayed release capsule TAKE 1 CAPSULE BY MOUTH DAILY 30 capsule 1    amLODIPine (NORVASC) 10 MG tablet Take 1 tablet by mouth daily 90 tablet 1    labetalol (NORMODYNE) 200 MG tablet Take 1 tablet by mouth 2 times daily 60 tablet 3    Ubrogepant (UBRELVY) 50 MG TABS Take 1 tablet po PRN at start of headaches, can repeat in 24 hours 16 tablet 1    Continuous Blood Gluc Sensor (DEXCOM G6 SENSOR) MISC 1 each by Does not apply route every 14 days 3 each 1    montelukast (SINGULAIR) 10 MG tablet TAKE 1 TABLET BY MOUTH DAILY 27 tablet 1    oxyCODONE-acetaminophen (PERCOCET) 7.5-325 MG per tablet Take 1 tablet by mouth every 6 hours as needed for Pain (max 3-4 per day) for up to 28 days. 100 tablet 0    DULoxetine (CYMBALTA) 60 MG extended release capsule Take 1 capsule by mouth daily 30 capsule 1    QUEtiapine (SEROQUEL) 25 MG tablet Take 1-2 tablets by mouth nightly 60 tablet 1    tiZANidine (ZANAFLEX) 4 MG tablet Take 0.5-1 tablets by mouth 2 times daily as needed (muscle spasms) 60 tablet 1    buPROPion (ZYBAN) 150 MG extended release tablet Take 1 tablet by mouth 2 times daily 60 tablet 5    clotrimazole-betamethasone (LOTRISONE) 1-0.05 % cream Apply topically 2 times daily.  5 g 5    Continuous Blood Gluc  (DEXCOM G6 ) DAYO Change sensor every 10 days 1 each 0    albuterol sulfate  (90 Base) MCG/ACT inhaler INHALE 2 PUFFS INTO THE LUNGS EVERY 4 HOURS AS NEEDED FOR WHEEZING OR SHORTNESS OF BREATH 54 g 5    ASPIRIN LOW DOSE 81 MG EC tablet TAKE 1 TABLET BY MOUTH DAILY 90 tablet 5    Continuous Blood Gluc  (FREESTYLE LISSETT 14 DAY READER) DAYO 1 each by Does not apply route 3 times daily 1 each 1    Continuous Blood Gluc Sensor (FREESTYLE LISSETT 14 DAY SENSOR) MISC 1 each by Does not apply route every 14 days 2 each 5    Continuous Blood Gluc Transmit (DEXCOM G6 TRANSMITTER) MISC 1 each by Does not apply route daily 1 each 2    Continuous Blood Gluc  (DEXCOM G6 ) DAYO 1 each by Does not apply route daily 1 each 1    albuterol (PROVENTIL) (2.5 MG/3ML) 0.083% nebulizer solution TAKE 3 MLS BY NEBULIZATION EVERY 4 HOURS AS NEEDED FOR WHEEZING 360 mL 5    ULTICARE MINI PEN NEEDLES 31G X 6 MM MISC USE WITH INSULINS FOUR TIMES A DAY 90 each 1    metoprolol succinate (TOPROL XL) 50 MG extended release tablet Take 1 tablet by mouth nightly 90 tablet 5    insulin glargine (BASAGLAR KWIKPEN) 100 UNIT/ML injection pen Inject 8 Units into the skin 2 times daily 5 pen 3    isosorbide mononitrate (IMDUR) 60 MG extended release tablet Take 1 tablet by mouth daily 90 tablet 5    nitroGLYCERIN (NITRODUR) 0.1 MG/HR Place 1 patch onto the skin every 24 hours 30 patch 0    docusate sodium (COLACE) 100 MG capsule Take 100 mg by mouth 2 times daily      atorvastatin (LIPITOR) 80 MG tablet TAKE 1 TABLET BY MOUTH NIGHTLY 90 tablet 5    clopidogrel (PLAVIX) 75 MG tablet TAKE ONE TABLET BY MOUTH EVERY DAY 90 tablet 5    ranolazine (RANEXA) 1000 MG extended release tablet Take 1 tablet by mouth 2 times daily 60 tablet 8    linaclotide (LINZESS) 145 MCG capsule Take 1 capsule by mouth every morning (before breakfast) 30 capsule 5    nitroGLYCERIN (NITROSTAT) 0.4 MG SL tablet Place 1 tablet under the tongue every 5 minutes as needed for Chest pain up to max of 3 total doses. If no relief after 1 dose, call 911. 25 tablet 3    Isometheptene-Dichloral-APAP (MIDRIN) -325 MG CAPS Take 1 capsule by mouth See Admin Instructions for 10 days. Tid prn 30 capsule 0     No facility-administered medications prior to visit. REVIEW OF SYSTEMS: .   Respiratory: Negative for shortness of breath. Cardiovascular: Negative for chest pain, palpitations  Gastrointestinal: Negative for blood in stool, abdominal distention, nausea, vomiting, abdominal pain, diarrhea,constipation. Neurological: Negative for speech difficulty, weakness and light-headedness, dizziness, tremors, sleepiness  Psychiatric/Behavioral: Negative for suicidal ideas, hallucinations, behavioral problems, self-injury, decreased concentration/cognition, agitation, confusion. PHYSICAL EXAM:   Nursing note and vitals reviewed. /67   Pulse 67   Ht 5' (1.524 m)   Wt 125 lb (56.7 kg)   SpO2 (!) 76%   BMI 24.41 kg/m²   General Appearance: Patient is well nourished, well developed, well groomed and in no acute distress. Skin: Skin is warm and dry, good turgor . No rash or lesions noted. She is not diaphoretic. Pulmonary/Chest: Effort normal. No respiratory distress or use of accessory muscles. Auscultation revealing normal air entry. She does not have wheezes in the lung fields. She has no rales. Cardiovascular: Normal rate, regular rhythm, normal heart sounds, and does not have murmur. Exam reveals no gallop and no friction rub. Musculoskeletal / Extremities: Range of motion is normal. Gait is normal, assistive devices use: none. She exhibits edema: none, and no tenderness. Neurological/Psychiatric:She is alert and oriented to person, place, and time. Coordination is  normal.   Judgement and Insight is normal  Her mood is Appropriate and affect is Flat/blunted and Anxious . Her behavior is normal.   thought content normal.        IMPRESSION:     1. Intractable migraine with aura without status migrainosus - INADEQUATELY CONTROLLED: treatment plan adjusted as below    2. Chronic pain syndrome - INADEQUATELY CONTROLLED: treatment plan adjusted as below    3. Fibromyalgia - STABLE: Continue current treatment plan    4. DDD (degenerative disc disease), lumbar  - STABLE: Continue current treatment plan   5. Primary insomnia  - STABLE: Continue current treatment plan   6. Gastro-esophageal reflux disease without esophagitis  - STABLE: Continue current treatment plan   7. Headache, chronic migraine without aura, intractable, with status  - STABLE: Continue current treatment plan   8. Rotator cuff tendonitis, right  - STABLE: Continue current treatment plan   9. Tendinitis of right rotator cuff  - STABLE: Continue current treatment plan   10. Moderate episode of recurrent major depressive disorder (HCC)  - STABLE: Continue current treatment plan   11. Chronic painful diabetic neuropathy (HCC)  - STABLE: Continue current treatment plan   12. DDD (degenerative disc disease), cervical  - STABLE: Continue current treatment plan       PLAN:  Informed verbal consent was obtained.  -Discussed use, benefit, and side effects of prescribed medications. Barriers to medication compliance addressed. All patient questions answered.  Pt voiced understanding. -ROM/Stretching exercises as advised   -she was advised  to avoid using too many OTC analgesics to control the headaches, avoid chocolates, increased caffeine, cheeses and MSG nitrite containing foods, cigarette smoking. To avoid bright lights, strong smells and skipping meals.   -Start Emgality 2400 mcg boxes then 120 mg S/C monthly   -Continue with Livier Ramming PRN   -Discontinue Midrin   -she was advised proper sleep hygiene-told to avoid:use of caffeine or other stimulants after noon, alcohol use near bedtime, long or frequent naps during the day, erratic sleep schedule, heavy meals near bedtime, vigorous exercise near bedtime and use of electronic devices near bedtime   -Continue with Seroquel   -Interm history reviewed    -ER records reviewed   -Records from Cardiology   -CBT techniques- relaxation therapies such as biofeedback, mindfulness based stress reduction, imagery, cognitive restructuring, problem solving discussed with patient   -Continue with Cymbalta 60 mg   -Continue with Percocet 7.5 mg 3-4 per day  -She was advised to increase fluids ( 5-7  glasses of fluid daily), limit caffeine, avoid cheese products, increase dietary fiber, increase activity and exercise as tolerated and relax regularly and enjoy meals     Ms. Edwina Guerra will be prescribed  the medications  listed below which are for treatment of her presenting  medical problems which for this visit include:   Diagnoses of Chronic pain syndrome, Fibromyalgia, DDD (degenerative disc disease), lumbar, Primary insomnia, Gastro-esophageal reflux disease without esophagitis, Headache, chronic migraine without aura, intractable, with status, Rotator cuff tendonitis, right, Tendinitis of right rotator cuff, Moderate episode of recurrent major depressive disorder (HCC), Chronic painful diabetic neuropathy ([facility]), DDD (degenerative disc disease), cervical, and Intractable migraine with aura without status migrainosus were pertinent to this visit. Medications/orders associated with this visit:    Current Outpatient Medications   Medication Sig Dispense Refill    omeprazole (PRILOSEC) 20 MG delayed release capsule TAKE 1 CAPSULE BY MOUTH DAILY 30 capsule 1    amLODIPine (NORVASC) 10 MG tablet Take 1 tablet by mouth daily 90 tablet 1    labetalol (NORMODYNE) 200 MG tablet Take 1 tablet by mouth 2 times daily 60 tablet 3    Ubrogepant (UBRELVY) 50 MG TABS Take 1 tablet po PRN at start of headaches, can repeat in 24 hours 16 tablet 1    Continuous Blood Gluc Sensor (DEXCOM G6 SENSOR) MISC 1 each by Does not apply route every 14 days 3 each 1    montelukast (SINGULAIR) 10 MG tablet TAKE 1 TABLET BY MOUTH DAILY 27 tablet 1    oxyCODONE-acetaminophen (PERCOCET) 7.5-325 MG per tablet Take 1 tablet by mouth every 6 hours as needed for Pain (max 3-4 per day) for up to 28 days. 100 tablet 0    DULoxetine (CYMBALTA) 60 MG extended release capsule Take 1 capsule by mouth daily 30 capsule 1    QUEtiapine (SEROQUEL) 25 MG tablet Take 1-2 tablets by mouth nightly 60 tablet 1    tiZANidine (ZANAFLEX) 4 MG tablet Take 0.5-1 tablets by mouth 2 times daily as needed (muscle spasms) 60 tablet 1    buPROPion (ZYBAN) 150 MG extended release tablet Take 1 tablet by mouth 2 times daily 60 tablet 5    clotrimazole-betamethasone (LOTRISONE) 1-0.05 % cream Apply topically 2 times daily.  5 g 5    Continuous Blood Gluc  (DEXCOM G6 ) DAYO Change sensor every 10 days 1 each 0    albuterol sulfate  (90 Base) MCG/ACT inhaler INHALE 2 PUFFS INTO THE LUNGS EVERY 4 HOURS AS NEEDED FOR WHEEZING OR SHORTNESS OF BREATH 54 g 5    ASPIRIN LOW DOSE 81 MG EC tablet TAKE 1 TABLET BY MOUTH DAILY 90 tablet 5    Continuous Blood Gluc  (FREESTYLE LISSETT 14 DAY READER) DAYO 1 each by Does not apply route 3 times daily 1 each 1    Continuous Blood Gluc Sensor (FREESTYLE LISSETT 14 DAY SENSOR) MISC 1 each by Does not apply route every 14 days 2 each 5    Continuous Blood Gluc Transmit (DEXCOM G6 TRANSMITTER) MISC 1 each by Does not apply route daily 1 each 2    Continuous Blood Gluc  (DEXCOM G6 ) DAYO 1 each by Does not apply route daily 1 each 1    albuterol (PROVENTIL) (2.5 MG/3ML) 0.083% nebulizer solution TAKE 3 MLS BY NEBULIZATION EVERY 4 HOURS AS NEEDED FOR WHEEZING 360 mL 5    ULTICARE MINI PEN NEEDLES 31G X 6 MM MISC USE WITH INSULINS FOUR TIMES A DAY 90 each 1    metoprolol succinate (TOPROL XL) 50 MG extended release tablet Take 1 tablet by mouth nightly 90 tablet 5    insulin glargine (BASAGLAR KWIKPEN) 100 UNIT/ML injection pen Inject 8 Units into the skin 2 times daily 5 pen 3    isosorbide mononitrate (IMDUR) 60 MG extended release tablet Take 1 tablet by mouth daily 90 tablet 5    nitroGLYCERIN (NITRODUR) 0.1 MG/HR Place 1 patch onto the skin every 24 hours 30 patch 0    docusate sodium (COLACE) 100 MG capsule Take 100 mg by mouth 2 times daily      atorvastatin (LIPITOR) 80 MG tablet TAKE 1 TABLET BY MOUTH NIGHTLY 90 tablet 5    clopidogrel (PLAVIX) 75 MG tablet TAKE ONE TABLET BY MOUTH EVERY DAY 90 tablet 5    ranolazine (RANEXA) 1000 MG extended release tablet Take 1 tablet by mouth 2 times daily 60 tablet 8    linaclotide (LINZESS) 145 MCG capsule Take 1 capsule by mouth every morning (before breakfast) 30 capsule 5    nitroGLYCERIN (NITROSTAT) 0.4 MG SL tablet Place 1 tablet under the tongue every 5 minutes as needed for Chest pain up to max of 3 total doses. If no relief after 1 dose, call 911. 25 tablet 3     No current facility-administered medications for this visit. Goals of current treatment regimen include improvement in pain, restoration of functioning- with focus on improvement in physical performance, general activity, work or disability,emotional distress, health care utilization and  decreased medication consumption.  Will continue to monitor progress towards achieving/maintaining therapeutic goals with special emphasis on  1. Improvement in perceived interfernce  of pain with ADL's. Ability to do home exercises independently. Ability to do household chores indoor and/or outdoor work and social and leisure activities. To increase flexibility/ROM, strength and endurance. Improve psychosocial and physical functioning.- she is not showing any significant progress/or showing regression  towards this goal and reassessment and adjustment of goals/treatment have been made. 2. Improving sleep to 6-7 hours a night. Improve mood/ anxiety and depression symptoms such as crying spells, low energy, problems with concentration, motivation. - she is not showing any significant progress/or showing regression  towards this goal and reassessment and adjustment of goals/treatment have been made. 3. Reduction of reliance on opioid analgesia/more appropriate opioid use. - she is showing progression towards this treatment goal with the current regimen. Risks and benefits of the medications and other alternative treatments have been/were  discussed with the patient. Any questions on the  common side effects of these medications were also answered. She was advised against drinking alcohol with the narcotic pain medicines, advised against driving or handling machinery when  starting or adjusting the dose of medicines, feeling groggy or drowsy, or if having any cognitive issues related to the current medications. Sheis fully aware of the risk of overdose and death, if medicines are misused and not taken as prescribed. If she develops new symptoms or if the symptoms worsen, she was told to call the office. .  Thank you for allowing me to participate in the care of this patient.     Eufemia Hernandez MD    Cc: Addie Mercedes MD

## 2022-06-28 NOTE — TELEPHONE ENCOUNTER
Spoke with daughter Marilou Busing about her Mom's EDUADR is cancelled for 7/1/2022 and she will need a CTA done for Post Watchman implant. The order is in for CTA and BMP. Daughter is unsure when she can go because of transportation issues so I gave her the phone number for Centralized Scheduling and she will call me back to let me know when the appointment has been made. I informed that Dr. Justin Vargas would like it done on 6/29/2022 or as soon as possible.

## 2022-06-30 NOTE — RESULT ENCOUNTER NOTE
Called and spoke to this patient. Advised her that labs look good however her BS was elevated and she should follow up with her PCP. Advised her to continue her cardiac medications with no changes at this time. She stated understanding and had no questions.

## 2022-07-11 RX ORDER — CIPROFLOXACIN 500 MG/1
500 TABLET, FILM COATED ORAL 2 TIMES DAILY
Qty: 10 TABLET | Refills: 0 | Status: SHIPPED | OUTPATIENT
Start: 2022-07-11 | End: 2022-07-16

## 2022-07-13 NOTE — TELEPHONE ENCOUNTER
I have Maren scheduled for Tuesday 7/26/22 at [facility] arriving at 8:45 am.    I called and left message with the date/time and instructions and asked Maren to call the office back to confirm.

## 2022-07-15 DIAGNOSIS — J30.89 ALLERGIC RHINITIS DUE TO OTHER ALLERGIC TRIGGER, UNSPECIFIED SEASONALITY: ICD-10-CM

## 2022-07-15 NOTE — TELEPHONE ENCOUNTER
Medication:   Requested Prescriptions     Pending Prescriptions Disp Refills    fexofenadine (ALLEGRA) 180 MG tablet [Pharmacy Med Name: FEXOFENADINE  MG  Tablet] 30 tablet 1     Sig: TAKE 1 TABLET BY MOUTH DAILY        Last Filled:      Patient Phone Number: [phone] (home)     Last appt: 6/22/2022   Next appt: Visit date not found    Last OARRS:   RX Monitoring 4/9/2019   Attestation The Prescription Monitoring Report for this patient was reviewed today.

## 2022-07-18 RX ORDER — FEXOFENADINE HCL 180 MG/1
180 TABLET ORAL DAILY
Qty: 30 TABLET | Refills: 0 | Status: SHIPPED | OUTPATIENT
Start: 2022-07-18 | End: 2022-08-17

## 2022-07-26 ENCOUNTER — OFFICE VISIT (OUTPATIENT)
Dept: PAIN MANAGEMENT | Age: 67
End: 2022-07-26
Payer: COMMERCIAL

## 2022-07-26 ENCOUNTER — HOSPITAL ENCOUNTER (OUTPATIENT)
Dept: CT IMAGING | Age: 67
Discharge: HOME | End: 2022-07-26
Payer: COMMERCIAL

## 2022-07-26 VITALS
HEIGHT: 60 IN | SYSTOLIC BLOOD PRESSURE: 132 MMHG | WEIGHT: 125 LBS | DIASTOLIC BLOOD PRESSURE: 68 MMHG | OXYGEN SATURATION: 97 % | BODY MASS INDEX: 24.54 KG/M2 | HEART RATE: 75 BPM

## 2022-07-26 DIAGNOSIS — M50.30 DDD (DEGENERATIVE DISC DISEASE), CERVICAL: ICD-10-CM

## 2022-07-26 DIAGNOSIS — M79.7 FIBROMYALGIA: ICD-10-CM

## 2022-07-26 DIAGNOSIS — M75.81 TENDINITIS OF RIGHT ROTATOR CUFF: ICD-10-CM

## 2022-07-26 DIAGNOSIS — G89.4 CHRONIC PAIN SYNDROME: ICD-10-CM

## 2022-07-26 DIAGNOSIS — E11.40 CHRONIC PAINFUL DIABETIC NEUROPATHY (HCC): ICD-10-CM

## 2022-07-26 DIAGNOSIS — M75.81 ROTATOR CUFF TENDONITIS, RIGHT: ICD-10-CM

## 2022-07-26 DIAGNOSIS — M51.36 DDD (DEGENERATIVE DISC DISEASE), LUMBAR: ICD-10-CM

## 2022-07-26 DIAGNOSIS — I48.0 PAROXYSMAL A-FIB (HCC): ICD-10-CM

## 2022-07-26 PROCEDURE — 1090F PRES/ABSN URINE INCON ASSESS: CPT | Performed by: INTERNAL MEDICINE

## 2022-07-26 PROCEDURE — 3017F COLORECTAL CA SCREEN DOC REV: CPT | Performed by: INTERNAL MEDICINE

## 2022-07-26 PROCEDURE — G8427 DOCREV CUR MEDS BY ELIG CLIN: HCPCS | Performed by: INTERNAL MEDICINE

## 2022-07-26 PROCEDURE — 99213 OFFICE O/P EST LOW 20 MIN: CPT | Performed by: INTERNAL MEDICINE

## 2022-07-26 PROCEDURE — 1123F ACP DISCUSS/DSCN MKR DOCD: CPT | Performed by: INTERNAL MEDICINE

## 2022-07-26 PROCEDURE — G8400 PT W/DXA NO RESULTS DOC: HCPCS | Performed by: INTERNAL MEDICINE

## 2022-07-26 PROCEDURE — 1036F TOBACCO NON-USER: CPT | Performed by: INTERNAL MEDICINE

## 2022-07-26 PROCEDURE — 6360000004 HC RX CONTRAST MEDICATION: Performed by: INTERNAL MEDICINE

## 2022-07-26 PROCEDURE — 2022F DILAT RTA XM EVC RTNOPTHY: CPT | Performed by: INTERNAL MEDICINE

## 2022-07-26 PROCEDURE — 75574 CT ANGIO HRT W/3D IMAGE: CPT

## 2022-07-26 PROCEDURE — 3051F HG A1C>EQUAL 7.0%<8.0%: CPT | Performed by: INTERNAL MEDICINE

## 2022-07-26 PROCEDURE — G8420 CALC BMI NORM PARAMETERS: HCPCS | Performed by: INTERNAL MEDICINE

## 2022-07-26 RX ORDER — TIZANIDINE 4 MG/1
2-4 TABLET ORAL 2 TIMES DAILY PRN
Qty: 60 TABLET | Refills: 1 | Status: SHIPPED | OUTPATIENT
Start: 2022-07-26 | End: 2022-08-23 | Stop reason: SDUPTHER

## 2022-07-26 RX ORDER — QUETIAPINE FUMARATE 25 MG/1
25-50 TABLET, FILM COATED ORAL NIGHTLY
Qty: 60 TABLET | Refills: 1 | Status: SHIPPED | OUTPATIENT
Start: 2022-07-26 | End: 2022-08-23 | Stop reason: SDUPTHER

## 2022-07-26 RX ORDER — UBROGEPANT 50 MG/1
TABLET ORAL
Qty: 16 TABLET | Refills: 1 | Status: SHIPPED | OUTPATIENT
Start: 2022-07-26 | End: 2022-08-04 | Stop reason: SDUPTHER

## 2022-07-26 RX ORDER — DULOXETIN HYDROCHLORIDE 60 MG/1
60 CAPSULE, DELAYED RELEASE ORAL DAILY
Qty: 30 CAPSULE | Refills: 1 | Status: SHIPPED | OUTPATIENT
Start: 2022-07-26 | End: 2022-08-23 | Stop reason: SDUPTHER

## 2022-07-26 RX ORDER — GALCANEZUMAB 120 MG/ML
2 INJECTION, SOLUTION SUBCUTANEOUS
Qty: 2 PEN | Refills: 1 | Status: SHIPPED | OUTPATIENT
Start: 2022-07-26 | End: 2022-08-23 | Stop reason: SDUPTHER

## 2022-07-26 RX ORDER — OXYCODONE AND ACETAMINOPHEN 7.5; 325 MG/1; MG/1
1 TABLET ORAL EVERY 6 HOURS PRN
Qty: 100 TABLET | Refills: 0 | Status: SHIPPED | OUTPATIENT
Start: 2022-07-26 | End: 2022-08-23 | Stop reason: SDUPTHER

## 2022-07-26 RX ADMIN — IOPAMIDOL 75 ML: 755 INJECTION, SOLUTION INTRAVENOUS at 08:58

## 2022-07-28 NOTE — PROGRESS NOTES
Makenna Sapello  1955  [id number]      HISTORY OF PRESENT ILLNESS:  Ms. Celina Jcakson is a 73 y.o. female returns for a follow up visit for pain management  She has a diagnosis of   1. Chronic pain syndrome    2. Intractable migraine with aura without status migrainosus    3. Fibromyalgia    4. Gastro-esophageal reflux disease without esophagitis    5. DDD (degenerative disc disease), lumbar    6. Moderate episode of recurrent major depressive disorder ([facility] [address]    7. Intractable migraine with aura with status migrainosus    8. Chronic painful diabetic neuropathy ([facility] [address]    9. Tendinitis of right rotator cuff    10. Rotator cuff tendonitis, right    11. Primary insomnia    12. DDD (degenerative disc disease), cervical    13. Headache, chronic migraine without aura, intractable, with status    . She complains of pain in the  head, neck, left elbow, lower back, right knee with radiation to the left arm, right hip, right upper leg, right lower leg  She rates the pain 9/10 and describes it as sharp, aching, burning. Current treatment regimen has helped relieve about 10% of the pain. She denies any side effects from the current pain regimen. Patient reports that since the last follow up visit the physical functioning is unchanged, family/social relationships are unchanged, mood is unchanged sleep patterns are unchanged, and that the overall functioning is unchanged. Patient denies misusing/abusing her narcotic pain medications or using any illegal drugs. There are No indicators for possible drug abuse, addiction or diversion problems, patient states she has been doing fair, her pain has been about the same. Ms. Celina Jackson states she is using Ubrelvy along with Emgality S/C monthly which is helping with headaches. She mentions she is using Percocet along with other adjuvants. Patient says the pain is greater in the neck than the back.      ALLERGIES: Patients list of allergies were reviewed     MEDICATIONS: Ms. Celina Jackson list of medications were reviewed. Her current medications are   Outpatient Medications Prior to Visit   Medication Sig Dispense Refill    fexofenadine (ALLEGRA) 180 MG tablet TAKE 1 TABLET BY MOUTH DAILY 30 tablet 0    omeprazole (PRILOSEC) 20 MG delayed release capsule TAKE 1 CAPSULE BY MOUTH DAILY 30 capsule 1    amLODIPine (NORVASC) 10 MG tablet Take 1 tablet by mouth daily 90 tablet 1    labetalol (NORMODYNE) 200 MG tablet Take 1 tablet by mouth 2 times daily 60 tablet 3    Continuous Blood Gluc Sensor (DEXCOM G6 SENSOR) MISC 1 each by Does not apply route every 14 days 3 each 1    montelukast (SINGULAIR) 10 MG tablet TAKE 1 TABLET BY MOUTH DAILY 27 tablet 1    buPROPion (ZYBAN) 150 MG extended release tablet Take 1 tablet by mouth 2 times daily 60 tablet 5    clotrimazole-betamethasone (LOTRISONE) 1-0.05 % cream Apply topically 2 times daily.  5 g 5    Continuous Blood Gluc  (DEXCOM G6 ) DAYO Change sensor every 10 days 1 each 0    albuterol sulfate  (90 Base) MCG/ACT inhaler INHALE 2 PUFFS INTO THE LUNGS EVERY 4 HOURS AS NEEDED FOR WHEEZING OR SHORTNESS OF BREATH 54 g 5    ASPIRIN LOW DOSE 81 MG EC tablet TAKE 1 TABLET BY MOUTH DAILY 90 tablet 5    Continuous Blood Gluc  (FREESTYLE LISSETT 14 DAY READER) DAYO 1 each by Does not apply route 3 times daily 1 each 1    Continuous Blood Gluc Sensor (FREESTYLE LISSETT 14 DAY SENSOR) MISC 1 each by Does not apply route every 14 days 2 each 5    Continuous Blood Gluc Transmit (DEXCOM G6 TRANSMITTER) MISC 1 each by Does not apply route daily 1 each 2    Continuous Blood Gluc  (DEXCOM G6 ) DAYO 1 each by Does not apply route daily 1 each 1    albuterol (PROVENTIL) (2.5 MG/3ML) 0.083% nebulizer solution TAKE 3 MLS BY NEBULIZATION EVERY 4 HOURS AS NEEDED FOR WHEEZING 360 mL 5    ULTICARE MINI PEN NEEDLES 31G X 6 MM MISC USE WITH INSULINS FOUR TIMES A DAY 90 each 1    metoprolol succinate (TOPROL XL) 50 MG extended release tablet Take 1 tablet by mouth nightly 90 tablet 5    insulin glargine (BASAGLAR KWIKPEN) 100 UNIT/ML injection pen Inject 8 Units into the skin 2 times daily 5 pen 3    isosorbide mononitrate (IMDUR) 60 MG extended release tablet Take 1 tablet by mouth daily 90 tablet 5    nitroGLYCERIN (NITRODUR) 0.1 MG/HR Place 1 patch onto the skin every 24 hours 30 patch 0    docusate sodium (COLACE) 100 MG capsule Take 100 mg by mouth 2 times daily      atorvastatin (LIPITOR) 80 MG tablet TAKE 1 TABLET BY MOUTH NIGHTLY 90 tablet 5    clopidogrel (PLAVIX) 75 MG tablet TAKE ONE TABLET BY MOUTH EVERY DAY 90 tablet 5    ranolazine (RANEXA) 1000 MG extended release tablet Take 1 tablet by mouth 2 times daily 60 tablet 8    linaclotide (LINZESS) 145 MCG capsule Take 1 capsule by mouth every morning (before breakfast) 30 capsule 5    nitroGLYCERIN (NITROSTAT) 0.4 MG SL tablet Place 1 tablet under the tongue every 5 minutes as needed for Chest pain up to max of 3 total doses. If no relief after 1 dose, call 911. 25 tablet 3    Ubrogepant (UBRELVY) 50 MG TABS Take 1 tablet po PRN at start of headaches, can repeat in 24 hours 16 tablet 1    oxyCODONE-acetaminophen (PERCOCET) 7.5-325 MG per tablet Take 1 tablet by mouth every 6 hours as needed for Pain (max 3-4 per day) for up to 28 days. 100 tablet 0    DULoxetine (CYMBALTA) 60 MG extended release capsule Take 1 capsule by mouth daily 30 capsule 1    QUEtiapine (SEROQUEL) 25 MG tablet Take 1-2 tablets by mouth nightly 60 tablet 1    tiZANidine (ZANAFLEX) 4 MG tablet Take 0.5-1 tablets by mouth 2 times daily as needed (muscle spasms) 60 tablet 1    Galcanezumab-gnlm (EMGALITY) 120 MG/ML SOAJ Inject 2 pens into the skin every 30 days 2 pen 1     No facility-administered medications prior to visit. REVIEW OF SYSTEMS:    Respiratory: Negative for apnea, chest tightness and shortness of breath or change in baseline breathing. PHYSICAL EXAM:   Nursing note and vitals reviewed.  /68 Pulse 75   Ht 5' (1.524 m)   Wt 125 lb (56.7 kg)   SpO2 97%   BMI 24.41 kg/m²   Constitutional: She appears well-developed and well-nourished. No acute distress. Cardiovascular: Normal rate, regular rhythm, normal heart sounds, and does not have murmur. Pulmonary/Chest: Effort normal. No respiratory distress. She does not have wheezes in the lung fields. She has no rales. Neurological/Psychiatric:She is alert and oriented to person, place, and time. Coordination is  normal.  Her mood isAppropriate and affect is Neutral/Euthymic(normal) . Her    IMPRESSION:   1. Chronic pain syndrome    2. Fibromyalgia    3. DDD (degenerative disc disease), lumbar    4. Chronic painful diabetic neuropathy ([address]    5. Tendinitis of right rotator cuff    6. Rotator cuff tendonitis, right    7. DDD (degenerative disc disease), cervical        PLAN:  Informed verbal consent was obtained  -ROM/Stretching exercises as advised   -She was advised to increase fluids ( 5-7  glasses of fluid daily), limit caffeine, avoid cheese products, increase dietary fiber, increase activity and exercise as tolerated and relax regularly and enjoy meals   -Continue with Percocet 3-4 per day  -she was advised  to avoid using too many OTC analgesics to control the headaches, avoid chocolates, increased caffeine, cheeses and MSG nitrite containing foods, cigarette smoking. To avoid bright lights, strong smells and skipping meals.   -Continue with all other adjuvant medications as before      Current Outpatient Medications   Medication Sig Dispense Refill    Ubrogepant (UBRELVY) 50 MG TABS Take 1 tablet po PRN at start of headaches, can repeat in 24 hours 16 tablet 1    oxyCODONE-acetaminophen (PERCOCET) 7.5-325 MG per tablet Take 1 tablet by mouth every 6 hours as needed for Pain (max 3-4 per day) for up to 28 days. 100 tablet 0    DULoxetine (CYMBALTA) 60 MG extended release capsule Take 1 capsule by mouth in the morning.  30 capsule 1 QUEtiapine (SEROQUEL) 25 MG tablet Take 1-2 tablets by mouth nightly 60 tablet 1    tiZANidine (ZANAFLEX) 4 MG tablet Take 0.5-1 tablets by mouth 2 times daily as needed (muscle spasms) 60 tablet 1    Galcanezumab-gnlm (EMGALITY) 120 MG/ML SOAJ Inject 2 pens into the skin every 30 days 2 pen 1    fexofenadine (ALLEGRA) 180 MG tablet TAKE 1 TABLET BY MOUTH DAILY 30 tablet 0    omeprazole (PRILOSEC) 20 MG delayed release capsule TAKE 1 CAPSULE BY MOUTH DAILY 30 capsule 1    amLODIPine (NORVASC) 10 MG tablet Take 1 tablet by mouth daily 90 tablet 1    labetalol (NORMODYNE) 200 MG tablet Take 1 tablet by mouth 2 times daily 60 tablet 3    Continuous Blood Gluc Sensor (DEXCOM G6 SENSOR) MISC 1 each by Does not apply route every 14 days 3 each 1    montelukast (SINGULAIR) 10 MG tablet TAKE 1 TABLET BY MOUTH DAILY 27 tablet 1    buPROPion (ZYBAN) 150 MG extended release tablet Take 1 tablet by mouth 2 times daily 60 tablet 5    clotrimazole-betamethasone (LOTRISONE) 1-0.05 % cream Apply topically 2 times daily.  5 g 5    Continuous Blood Gluc  (DEXCOM G6 ) DAYO Change sensor every 10 days 1 each 0    albuterol sulfate  (90 Base) MCG/ACT inhaler INHALE 2 PUFFS INTO THE LUNGS EVERY 4 HOURS AS NEEDED FOR WHEEZING OR SHORTNESS OF BREATH 54 g 5    ASPIRIN LOW DOSE 81 MG EC tablet TAKE 1 TABLET BY MOUTH DAILY 90 tablet 5    Continuous Blood Gluc  (FREESTYLE LISSETT 14 DAY READER) DAYO 1 each by Does not apply route 3 times daily 1 each 1    Continuous Blood Gluc Sensor (FREESTYLE LISSETT 14 DAY SENSOR) MISC 1 each by Does not apply route every 14 days 2 each 5    Continuous Blood Gluc Transmit (DEXCOM G6 TRANSMITTER) MISC 1 each by Does not apply route daily 1 each 2    Continuous Blood Gluc  (DEXCOM G6 ) DAYO 1 each by Does not apply route daily 1 each 1    albuterol (PROVENTIL) (2.5 MG/3ML) 0.083% nebulizer solution TAKE 3 MLS BY NEBULIZATION EVERY 4 HOURS AS NEEDED FOR WHEEZING 360 mL 5    ULTICARE MINI PEN NEEDLES 31G X 6 MM MISC USE WITH INSULINS FOUR TIMES A DAY 90 each 1    metoprolol succinate (TOPROL XL) 50 MG extended release tablet Take 1 tablet by mouth nightly 90 tablet 5    insulin glargine (BASAGLAR KWIKPEN) 100 UNIT/ML injection pen Inject 8 Units into the skin 2 times daily 5 pen 3    isosorbide mononitrate (IMDUR) 60 MG extended release tablet Take 1 tablet by mouth daily 90 tablet 5    nitroGLYCERIN (NITRODUR) 0.1 MG/HR Place 1 patch onto the skin every 24 hours 30 patch 0    docusate sodium (COLACE) 100 MG capsule Take 100 mg by mouth 2 times daily      atorvastatin (LIPITOR) 80 MG tablet TAKE 1 TABLET BY MOUTH NIGHTLY 90 tablet 5    clopidogrel (PLAVIX) 75 MG tablet TAKE ONE TABLET BY MOUTH EVERY DAY 90 tablet 5    ranolazine (RANEXA) 1000 MG extended release tablet Take 1 tablet by mouth 2 times daily 60 tablet 8    linaclotide (LINZESS) 145 MCG capsule Take 1 capsule by mouth every morning (before breakfast) 30 capsule 5    nitroGLYCERIN (NITROSTAT) 0.4 MG SL tablet Place 1 tablet under the tongue every 5 minutes as needed for Chest pain up to max of 3 total doses. If no relief after 1 dose, call 911. 25 tablet 3     No current facility-administered medications for this visit. I will continue her current medication regimen  which is part of the above treatment schedule. It has been helping with Ms. Meza's chronic  medical problems which for this visit include:   Diagnoses of Chronic pain syndrome, Intractable migraine with aura without status migrainosus, Fibromyalgia, Gastro-esophageal reflux disease without esophagitis, DDD (degenerative disc disease), lumbar, Moderate episode of recurrent major depressive disorder ([address] Intractable migraine with aura with status migrainosus, Chronic painful diabetic neuropathy ([address] Tendinitis of right rotator cuff, Rotator cuff tendonitis, right, Primary insomnia, DDD (degenerative disc disease), cervical, and Headache, chronic migraine without aura, intractable, with status were pertinent to this visit. Risks and benefits of the medications and other alternative treatments  including no treatment were discussed with the patient. The common side effects of these medications were also explained to the patient. Informed verbal consent was obtained. Goals of current treatment regimen include improvement in pain, restoration of functioning- with focus on improvement in physical performance, general activity, work or disability,emotional distress, health care utilization and  decreased medication consumption. Will continue to monitor progress towards achieving/maintaining therapeutic goals with special emphasis on  1. Improvement in perceived interfernce  of pain with ADL's. Ability to do home exercises independently. Ability to do household chores indoor and/or outdoor work and social and leisure activities. Improve psychosocial and physical functioning. - she is showing progression towards this treatment goal with the current regimen. She was advised against drinking alcohol with the narcotic pain medicines, advised against driving or handling machinery while adjusting the dose of medicines or if having cognitive  issues related to the current medications. Risk of overdose and death, if medicines not taken as prescribed, were also discussed. If the patient develops new symptoms or if the symptoms worsen, the patient should call the office. While transcribing every attempt was made to maintain the accuracy of the note in terms of it's contents,there may have been some errors made inadvertently. Thank you for allowing me to participate in the care of this patient.     Ba Rainey MD.    Cc: Dash Wild MD

## 2022-08-02 NOTE — TELEPHONE ENCOUNTER
Spoke with Dr. Hilton Leventhal regarding [place] CTA results. Dr. Hilton Leventhal stopped the Plavix and placed patient on Eliquis 2.5mg  BID and continued Baby Aspirin. Will follow up in Nov.and may adjust medications at that point. Patient had a IVC stent placed prior to Watchman. Spoke with patient and informed her of the medication changes and med that was sent to her pharmacy.

## 2022-08-04 ENCOUNTER — TELEMEDICINE (OUTPATIENT)
Dept: PRIMARY CARE CLINIC | Age: 67
End: 2022-08-04
Payer: COMMERCIAL

## 2022-08-04 DIAGNOSIS — G89.4 CHRONIC PAIN SYNDROME: ICD-10-CM

## 2022-08-04 DIAGNOSIS — Z79.4 TYPE 2 DIABETES MELLITUS WITH OTHER CIRCULATORY COMPLICATION, WITH LONG-TERM CURRENT USE OF INSULIN (HCC): ICD-10-CM

## 2022-08-04 DIAGNOSIS — E11.59 TYPE 2 DIABETES MELLITUS WITH OTHER CIRCULATORY COMPLICATION, WITH LONG-TERM CURRENT USE OF INSULIN (HCC): ICD-10-CM

## 2022-08-04 DIAGNOSIS — I10 ESSENTIAL HYPERTENSION: ICD-10-CM

## 2022-08-04 PROCEDURE — 99214 OFFICE O/P EST MOD 30 MIN: CPT | Performed by: INTERNAL MEDICINE

## 2022-08-04 PROCEDURE — G8427 DOCREV CUR MEDS BY ELIG CLIN: HCPCS | Performed by: INTERNAL MEDICINE

## 2022-08-04 PROCEDURE — 1123F ACP DISCUSS/DSCN MKR DOCD: CPT | Performed by: INTERNAL MEDICINE

## 2022-08-04 PROCEDURE — 3017F COLORECTAL CA SCREEN DOC REV: CPT | Performed by: INTERNAL MEDICINE

## 2022-08-04 PROCEDURE — 1036F TOBACCO NON-USER: CPT | Performed by: INTERNAL MEDICINE

## 2022-08-04 PROCEDURE — 3051F HG A1C>EQUAL 7.0%<8.0%: CPT | Performed by: INTERNAL MEDICINE

## 2022-08-04 PROCEDURE — G8420 CALC BMI NORM PARAMETERS: HCPCS | Performed by: INTERNAL MEDICINE

## 2022-08-04 PROCEDURE — G8400 PT W/DXA NO RESULTS DOC: HCPCS | Performed by: INTERNAL MEDICINE

## 2022-08-04 PROCEDURE — 1090F PRES/ABSN URINE INCON ASSESS: CPT | Performed by: INTERNAL MEDICINE

## 2022-08-04 PROCEDURE — 2022F DILAT RTA XM EVC RTNOPTHY: CPT | Performed by: INTERNAL MEDICINE

## 2022-08-04 RX ORDER — UBROGEPANT 50 MG/1
1 TABLET ORAL DAILY
Qty: 30 TABLET | Refills: 1 | Status: SHIPPED | OUTPATIENT
Start: 2022-08-04

## 2022-08-04 RX ORDER — LABETALOL 200 MG/1
200 TABLET, FILM COATED ORAL 2 TIMES DAILY
Qty: 60 TABLET | Refills: 3 | Status: SHIPPED | OUTPATIENT
Start: 2022-08-04 | End: 2022-08-30

## 2022-08-04 RX ORDER — METOPROLOL SUCCINATE 50 MG/1
50 TABLET, EXTENDED RELEASE ORAL NIGHTLY
Qty: 90 TABLET | Refills: 5 | Status: SHIPPED | OUTPATIENT
Start: 2022-08-04

## 2022-08-04 RX ORDER — AMLODIPINE BESYLATE 10 MG/1
10 TABLET ORAL DAILY
Qty: 90 TABLET | Refills: 1 | Status: SHIPPED | OUTPATIENT
Start: 2022-08-04

## 2022-08-04 ASSESSMENT — ENCOUNTER SYMPTOMS
VOMITING: 0
ABDOMINAL DISTENTION: 0
BLOOD IN STOOL: 0
CONSTIPATION: 0
BACK PAIN: 1
NAUSEA: 0
CHEST TIGHTNESS: 0

## 2022-08-04 NOTE — PROGRESS NOTES
Subjective:      Patient ID: Jaron Chicas is a 73 y.o. female. VV 8/4/2022 Patient presents with:  Diabetes  Hypertension  Headache  Stress:                 Last seen  6/22/2022 Patient presents with: [facility], 6/13-6/14/2022, headaches  CT Head and MRI Head done   Headache  Hypertension: states she is taking meds . 5/25/2022 Patient presents with:  Medicare AWV               1/27/2022 Patient presents with: Follow-Up from Hospital: [facility]-1/9/2022 - 1/13/2022 Chest pain, [place]    . Chest pain, ,, no further work-up recommended per cardiology    Diabetes mellitus, sliding scale    COPD, no acute exacerbation  . A. fib, controlled. Echo noted, discussed with Dr. Priyank Lester, at this time we will keep on aspirin and Plavix, she will follow-up with him as outpatient. To discuss watchman device  Renal insuff  monitor closely, improved creatinine to 1.4 this morning. Anemia,  Chronic    Peripheral vascular disease with discoloration of second left toe, vascular surgery consulted, Doppler noted with no significant occlusive disease, echo ordered per vascular . 10/1/2020               1/14/2020          9/24/19     8/31/19 !!!           8/29/19      hypertension, diabetes, hyperlipidemia, fibromyalgia, COPD, CKD, CHF and CAD           Review of Systems   Constitutional:  Negative for activity change, fatigue and fever. Flu vac 10/21     tdap 10/16      pneumovac 10/13     Shingrex    covid vac 3/21      HENT:          No teeth     No dentures    Eyes:         Last Eye Ex 3/20   Respiratory:  Negative for chest tightness. Shortness of breath: baseline. Getting urges to smoke again ! Known COPD   Cardiovascular: Negative. Known CAD with Stents . Atrial Fibrillation , S/P Watchman device 5/22   Gastrointestinal:  Negative for abdominal distention, blood in stool, constipation, nausea and vomiting. Upper / lower endopscopy 4/19       No Fh of ca colon . Genitourinary:  Negative for dysuria, frequency, menstrual problem, urgency and vaginal discharge. Hysterectomy   Mammogram 1/16    Musculoskeletal:  Positive for arthralgias, back pain, gait problem and myalgias. Pain Disorder c/o Pain Management    Neurological:  Positive for weakness and headaches (Chronic . off and on ). Light-headedness: off and on . Hematological:            Did see Hematology for anemia . Bone Marrow exam Nl    Psychiatric/Behavioral:  Positive for dysphoric mood (Remembers her son's murder > 20 yrs ago). Negative for behavioral problems and sleep disturbance. The patient is not nervous/anxious. Objective:   Physical Exam  Vitals reviewed. Constitutional:       Appearance: She is not ill-appearing. Comments: Vitals as noted 7/26/22 at pain management ; /68    BP today at home 170/90 ?? Musculoskeletal:      Comments:        Neurological:      Mental Status: She is oriented to person, place, and time. Psychiatric:         Attention and Perception: Attention normal.       Assessment:     Ritesh Ramey is a 73 y.o. female [facility] on 8/4/2022 for Diabetes, Hypertension, Headache, and Stress (/)  . Documentation:  I communicated with the patient r about this   Details of this discussion including any medical advice provided:as noted     Total Time: minutes: 11-20 minutes    Ritesh Ramey was evaluated through a synchronous (real-time) audio encounter. Patient identification was verified at the start of the visit. She (or guardian if applicable) is aware that this is a billable service, which includes applicable co-pays. This visit was conducted with the patient's (and/or legal guardian's) verbal consent. She has not had a related appointment within my department in the past 7 days or scheduled within the next 24 hours.    The patient was located at [facility] provider was located at [facility] (Appt Dept): [address],  [address] [facility]. Note: not billable if this call serves to triage the patient into an appointment for the relevant concern    MD Maren Beth was seen today for diabetes, hypertension, headache and stress. Diagnoses and all orders for this visit:    Essential hypertension  Continue to take meds reg + low salt diet ( metoprol was replaced with Labetalol )  -     amLODIPine (NORVASC) 10 MG tablet; Take 1 tablet by mouth in the morning.  -     labetalol (NORMODYNE) 200 MG tablet; Take 1 tablet by mouth in the morning and 1 tablet before bedtime.  -     metoprolol succinate (TOPROL XL) 50 MG extended release tablet; Take 1 tablet by mouth nightly        Chronic pain syndrome  Headaches . Nl MRI/ MRA brain . Seen Neurology for tiny meningioma 6/22 . Watchful waiting recommended . May HOLD Isisorbide to see if Headaches improve . Cont Ubrelvi as started by Pain Management   -     Ubrogepant (UBRELVY) 50 MG TABS; Take 1 tablet by mouth daily Take 1 tablet po PRN at start of headaches, can repeat in 24 hours        Other headache syndrome    Multifactorial .   Hold Imdur . Control BP as noted above . Get input from Nephrology too         High priority for COVID-19 vaccination  Needs third booster     Need for pneumococcal vaccination    Need for shingles vaccine  [facility]   -     zoster recombinant adjuvanted vaccine [facility]) 50 MCG/0.5ML SUSR injection; Inject 0.5 mLs into the muscle once for 1 dose      Encounter for screening mammogram for high-risk patient  Long over due   -     NOHEMI DIGITAL SCREEN W OR WO CAD BILATERAL; Future    Age-related osteoporosis without current pathological fracture  -     DEXA BONE DENSITY 2 SITES; Future    Tinea pedis, left  -     clotrimazole-betamethasone (LOTRISONE) 1-0.05 % cream; Apply topically 2 times daily. Positive depression screening  Addied  Wellbutrin .  Refer to [address] / [facility] -     DULoxetine (CYMBALTA) 60 MG extended release capsule; Take 1 capsule by mouth daily  -     buPROPion (ZYBAN) 150 MG extended release tablet; Take 1 tablet by mouth 2 times daily        Vaginal spotting if it continues will consult Gynae. On blood thinners     Renal insufficiency  C/O nephrology       Type 2 diabetes mellitus with other circulatory complication, with long-term current use of insulin ([facility])  last  A1C 7.4    . Rechk again . Eye exam needed     -     insulin glargine (BASAGLAR KWIKPEN) 100 UNIT/ML injection pen; Inject 8 Units into the skin 2 times daily  -    Gait abnormality  Advised to use cane while walking             Neuropathy    Chronic pain syndrome in Pain management . Coronary artery disease involving native coronary artery of native heart without angina pectoris  -    Plavix 75 mg / d    metoprolol succinate (TOPROL XL) 50 MG extended release tablet; Take 0.5 tablets by mouth daily  -     RANEXA 1000 MG extended release tablet; Take 1 tablet by mouth 2 times daily  -     atorvastatin (LIPITOR) 80 MG tablet; Take 1 tablet by mouth daily  -     aspirin (ASPIRIN LOW DOSE) 81 MG EC tablet; Take 1 tablet by mouth daily  -     isosorbide mononitrate (IMDUR) 30 MG extended release tablet; Take 1 tablet by mouth daily  -     nitroGLYCERIN (NITROSTAT) 0.4 MG SL tablet; Place 1 tablet under the tongue every 5 minutes as needed for Chest pain up to max of 3 total doses. If no relief after 1 dose, call 911. Diabetic gastroparesis ([facility])  Control sugars .        Other hyperlipidemia  80 mg Lipitor             Anxiety  and Depression     cymbalta per Pain Dr   Plan:

## 2022-08-11 RX ORDER — HYDROXYZINE HYDROCHLORIDE 25 MG/1
25 TABLET, FILM COATED ORAL EVERY 8 HOURS PRN
Qty: 30 TABLET | Refills: 0 | Status: SHIPPED | OUTPATIENT
Start: 2022-08-11 | End: 2022-08-21

## 2022-08-13 DIAGNOSIS — J30.89 ALLERGIC RHINITIS DUE TO OTHER ALLERGIC TRIGGER, UNSPECIFIED SEASONALITY: ICD-10-CM

## 2022-08-15 DIAGNOSIS — G89.4 CHRONIC PAIN SYNDROME: ICD-10-CM

## 2022-08-15 DIAGNOSIS — I10 ESSENTIAL HYPERTENSION: ICD-10-CM

## 2022-08-15 RX ORDER — MONTELUKAST SODIUM 10 MG/1
10 TABLET ORAL DAILY
Qty: 30 TABLET | Refills: 1 | Status: SHIPPED | OUTPATIENT
Start: 2022-08-15 | End: 2022-10-18

## 2022-08-15 RX ORDER — OMEPRAZOLE 20 MG/1
20 CAPSULE, DELAYED RELEASE ORAL DAILY
Qty: 30 CAPSULE | Refills: 1 | Status: SHIPPED | OUTPATIENT
Start: 2022-08-15 | End: 2022-10-18

## 2022-08-15 NOTE — TELEPHONE ENCOUNTER
Medication:   Requested Prescriptions     Pending Prescriptions Disp Refills    montelukast (SINGULAIR) 10 MG tablet [Pharmacy Med Name: MONTELUKAST SOD 10 MG TAB 10 Tablet] 30 tablet 1     Sig: TAKE 1 TABLET BY MOUTH DAILY        Last Filled:      Patient Phone Number: [phone] (home)     Last appt: 8/4/2022   Next appt: Visit date not found    Last OARRS:   RX Monitoring 4/9/2019   Attestation The Prescription Monitoring Report for this patient was reviewed today.

## 2022-08-22 ENCOUNTER — PATIENT MESSAGE (OUTPATIENT)
Dept: PRIMARY CARE CLINIC | Age: 67
End: 2022-08-22

## 2022-08-22 DIAGNOSIS — Z79.4 TYPE 2 DIABETES MELLITUS WITH OTHER CIRCULATORY COMPLICATION, WITH LONG-TERM CURRENT USE OF INSULIN (HCC): ICD-10-CM

## 2022-08-22 DIAGNOSIS — E11.59 TYPE 2 DIABETES MELLITUS WITH OTHER CIRCULATORY COMPLICATION, WITH LONG-TERM CURRENT USE OF INSULIN (HCC): ICD-10-CM

## 2022-08-23 ENCOUNTER — OFFICE VISIT (OUTPATIENT)
Dept: PAIN MANAGEMENT | Age: 67
End: 2022-08-23
Payer: COMMERCIAL

## 2022-08-23 VITALS
DIASTOLIC BLOOD PRESSURE: 83 MMHG | HEART RATE: 82 BPM | OXYGEN SATURATION: 99 % | SYSTOLIC BLOOD PRESSURE: 163 MMHG | WEIGHT: 123.2 LBS | BODY MASS INDEX: 24.06 KG/M2

## 2022-08-23 DIAGNOSIS — G89.4 CHRONIC PAIN SYNDROME: ICD-10-CM

## 2022-08-23 DIAGNOSIS — M75.81 TENDINITIS OF RIGHT ROTATOR CUFF: ICD-10-CM

## 2022-08-23 DIAGNOSIS — M75.81 ROTATOR CUFF TENDONITIS, RIGHT: ICD-10-CM

## 2022-08-23 DIAGNOSIS — M50.30 DDD (DEGENERATIVE DISC DISEASE), CERVICAL: ICD-10-CM

## 2022-08-23 DIAGNOSIS — E11.40 CHRONIC PAINFUL DIABETIC NEUROPATHY (HCC): ICD-10-CM

## 2022-08-23 DIAGNOSIS — M51.36 DDD (DEGENERATIVE DISC DISEASE), LUMBAR: ICD-10-CM

## 2022-08-23 DIAGNOSIS — M79.7 FIBROMYALGIA: ICD-10-CM

## 2022-08-23 PROCEDURE — 3052F HG A1C>EQUAL 8.0%<EQUAL 9.0%: CPT | Performed by: INTERNAL MEDICINE

## 2022-08-23 PROCEDURE — 2022F DILAT RTA XM EVC RTNOPTHY: CPT | Performed by: INTERNAL MEDICINE

## 2022-08-23 PROCEDURE — G8427 DOCREV CUR MEDS BY ELIG CLIN: HCPCS | Performed by: INTERNAL MEDICINE

## 2022-08-23 PROCEDURE — 99213 OFFICE O/P EST LOW 20 MIN: CPT | Performed by: INTERNAL MEDICINE

## 2022-08-23 PROCEDURE — G8400 PT W/DXA NO RESULTS DOC: HCPCS | Performed by: INTERNAL MEDICINE

## 2022-08-23 PROCEDURE — G8420 CALC BMI NORM PARAMETERS: HCPCS | Performed by: INTERNAL MEDICINE

## 2022-08-23 PROCEDURE — 1123F ACP DISCUSS/DSCN MKR DOCD: CPT | Performed by: INTERNAL MEDICINE

## 2022-08-23 PROCEDURE — 3017F COLORECTAL CA SCREEN DOC REV: CPT | Performed by: INTERNAL MEDICINE

## 2022-08-23 PROCEDURE — 1036F TOBACCO NON-USER: CPT | Performed by: INTERNAL MEDICINE

## 2022-08-23 PROCEDURE — 1090F PRES/ABSN URINE INCON ASSESS: CPT | Performed by: INTERNAL MEDICINE

## 2022-08-23 RX ORDER — GALCANEZUMAB 120 MG/ML
2 INJECTION, SOLUTION SUBCUTANEOUS
Qty: 2 PEN | Refills: 1 | Status: ON HOLD | OUTPATIENT
Start: 2022-08-23 | End: 2022-08-27

## 2022-08-23 RX ORDER — TIZANIDINE 4 MG/1
2-4 TABLET ORAL 2 TIMES DAILY PRN
Qty: 60 TABLET | Refills: 1 | Status: SHIPPED | OUTPATIENT
Start: 2022-08-23 | End: 2022-09-20 | Stop reason: SDUPTHER

## 2022-08-23 RX ORDER — OXYCODONE AND ACETAMINOPHEN 7.5; 325 MG/1; MG/1
1 TABLET ORAL EVERY 6 HOURS PRN
Qty: 100 TABLET | Refills: 0 | Status: SHIPPED | OUTPATIENT
Start: 2022-08-23 | End: 2022-09-20 | Stop reason: SDUPTHER

## 2022-08-23 RX ORDER — DULOXETIN HYDROCHLORIDE 60 MG/1
60 CAPSULE, DELAYED RELEASE ORAL DAILY
Qty: 30 CAPSULE | Refills: 1 | Status: SHIPPED | OUTPATIENT
Start: 2022-08-23 | End: 2022-09-20 | Stop reason: SDUPTHER

## 2022-08-23 RX ORDER — QUETIAPINE FUMARATE 25 MG/1
25-50 TABLET, FILM COATED ORAL NIGHTLY
Qty: 60 TABLET | Refills: 1 | Status: SHIPPED | OUTPATIENT
Start: 2022-08-23 | End: 2022-09-20 | Stop reason: SDUPTHER

## 2022-08-23 NOTE — TELEPHONE ENCOUNTER
From: Divina Pisano  To: Dr. Rebecca German: 8/22/2022 10:42 AM EDT  Subject: Freestyle monitor     Just talk to worker at [facility] about a about the monitor for my diabetes freestyle she said for Dr Johnathan Patiño to resend and for the freestyle and put that I have to stick my fingers at least 3 times a day for me too qualify for it thanks

## 2022-08-23 NOTE — PROGRESS NOTES
Chata Maria  1955  [id number]      HISTORY OF PRESENT ILLNESS:  Ms. Lana Brothers is a 73 y.o. female returns for a follow up visit for pain management  She has a diagnosis of   1. Encounter for therapeutic drug monitoring    2. Chronic pain syndrome    3. Fibromyalgia    4. DDD (degenerative disc disease), lumbar    5. Chronic painful diabetic neuropathy ([address]    6. Tendinitis of right rotator cuff    7. Rotator cuff tendonitis, right    8. DDD (degenerative disc disease), cervical    9. Intractable migraine with aura without status migrainosus    10. Primary insomnia    11. Gastro-esophageal reflux disease without esophagitis    12. Headache, chronic migraine without aura, intractable, with status    13. Moderate episode of recurrent major depressive disorder ([address]    14. Intractable migraine with aura with status migrainosus    . She complains of pain in the  Neck, Low back   She rates the pain 9/10 and describes it as sharp, aching. Current treatment regimen has helped relieve about 10% of the pain. She denies any side effects from the current pain regimen. Patient reports that since the last follow up visit the physical functioning is unchanged, family/social relationships are unchanged, mood is unchanged sleep patterns are unchanged, and that the overall functioning is unchanged. Patient denies misusing/abusing her narcotic pain medications or using any illegal drugs. There are No indicators for possible drug abuse, addiction or diversion problems. Patient reports he has been doing fair, pain has been baseline and tolerable somewhat with the medications. She mentions she is using Percocet 3-4 per day along with Other adjuvants. She states her headaches symptoms have been on and off. She mentions no recent ER trips and the neck pain has been manageable. She complains the pain is greater in the neck than the back.      ALLERGIES: Patients list of allergies were reviewed     MEDICATIONS: Ms. Lana Brothers by Does not apply route daily 1 each 1    albuterol (PROVENTIL) (2.5 MG/3ML) 0.083% nebulizer solution TAKE 3 MLS BY NEBULIZATION EVERY 4 HOURS AS NEEDED FOR WHEEZING 360 mL 5    ULTICARE MINI PEN NEEDLES 31G X 6 MM MISC USE WITH INSULINS FOUR TIMES A DAY 90 each 1    insulin glargine (BASAGLAR KWIKPEN) 100 UNIT/ML injection pen Inject 8 Units into the skin 2 times daily 5 pen 3    isosorbide mononitrate (IMDUR) 60 MG extended release tablet Take 1 tablet by mouth daily 90 tablet 5    nitroGLYCERIN (NITRODUR) 0.1 MG/HR Place 1 patch onto the skin every 24 hours 30 patch 0    docusate sodium (COLACE) 100 MG capsule Take 100 mg by mouth 2 times daily      atorvastatin (LIPITOR) 80 MG tablet TAKE 1 TABLET BY MOUTH NIGHTLY 90 tablet 5    ranolazine (RANEXA) 1000 MG extended release tablet Take 1 tablet by mouth 2 times daily 60 tablet 8    linaclotide (LINZESS) 145 MCG capsule Take 1 capsule by mouth every morning (before breakfast) 30 capsule 5    nitroGLYCERIN (NITROSTAT) 0.4 MG SL tablet Place 1 tablet under the tongue every 5 minutes as needed for Chest pain up to max of 3 total doses. If no relief after 1 dose, call 911. 25 tablet 3    oxyCODONE-acetaminophen (PERCOCET) 7.5-325 MG per tablet Take 1 tablet by mouth every 6 hours as needed for Pain (max 3-4 per day) for up to 28 days. 100 tablet 0    DULoxetine (CYMBALTA) 60 MG extended release capsule Take 1 capsule by mouth in the morning. 30 capsule 1    QUEtiapine (SEROQUEL) 25 MG tablet Take 1-2 tablets by mouth nightly 60 tablet 1    tiZANidine (ZANAFLEX) 4 MG tablet Take 0.5-1 tablets by mouth 2 times daily as needed (muscle spasms) 60 tablet 1    Galcanezumab-gnlm (EMGALITY) 120 MG/ML SOAJ Inject 2 pens into the skin every 30 days 2 pen 1     No facility-administered medications prior to visit. REVIEW OF SYSTEMS:    Respiratory: Negative for apnea, chest tightness and shortness of breath or change in baseline breathing.       PHYSICAL EXAM:   Nursing note and vitals reviewed. BP (!) 163/83   Pulse 82   Wt 123 lb 3.2 oz (55.9 kg)   SpO2 99%   BMI 24.06 kg/m²   Constitutional: She appears well-developed and well-nourished. No acute distress. Cardiovascular: Normal rate, regular rhythm, normal heart sounds, and does not have murmur. Pulmonary/Chest: Effort normal. No respiratory distress. She does not have wheezes in the lung fields. She has no rales. Neurological/Psychiatric:She is alert and oriented to person, place, and time. Coordination is  normal.  Her mood isAppropriate and affect is Neutral/Euthymic(normal) . IMPRESSION:   1. Chronic pain syndrome    2. Fibromyalgia    3. DDD (degenerative disc disease), lumbar    4. Chronic painful diabetic neuropathy ([address]    5. Tendinitis of right rotator cuff    6. Rotator cuff tendonitis, right    7. DDD (degenerative disc disease), cervical        PLAN:  Informed verbal consent was obtained  -[place] record was obtained and reviewed  for the last one year and no indicators of drug misuse  were found. Any other controlled substance prescriptions  seen on the record have been accounted for, I am aware of the patient receiving these medications. Shana Hensley [place] record will be rechecked as part of office protocol.    -Urine drug screen with GC/MS for opiates and drugs of abuse was ordered and will follow up on results.   -she was advised  to avoid using too many OTC analgesics to control the headaches, avoid chocolates, increased caffeine, cheeses and MSG nitrite containing foods, cigarette smoking. To avoid bright lights, strong smells and skipping meals.   -Continue with Percocet 3-4 per day   -Continue with all other adjuvants as before    Current Outpatient Medications   Medication Sig Dispense Refill    oxyCODONE-acetaminophen (PERCOCET) 7.5-325 MG per tablet Take 1 tablet by mouth every 6 hours as needed for Pain (max 3-4 per day) for up to 28 days.  100 tablet 0    DULoxetine (CYMBALTA) 60 MG extended release capsule Take 1 capsule by mouth daily 30 capsule 1    QUEtiapine (SEROQUEL) 25 MG tablet Take 1-2 tablets by mouth nightly 60 tablet 1    tiZANidine (ZANAFLEX) 4 MG tablet Take 0.5-1 tablets by mouth 2 times daily as needed (muscle spasms) 60 tablet 1    Galcanezumab-gnlm (EMGALITY) 120 MG/ML SOAJ Inject 2 pens into the skin every 30 days 2 pen 1    montelukast (SINGULAIR) 10 MG tablet TAKE 1 TABLET BY MOUTH DAILY 30 tablet 1    omeprazole (PRILOSEC) 20 MG delayed release capsule TAKE 1 CAPSULE BY MOUTH DAILY 30 capsule 1    amLODIPine (NORVASC) 10 MG tablet Take 1 tablet by mouth in the morning. 90 tablet 1    labetalol (NORMODYNE) 200 MG tablet Take 1 tablet by mouth in the morning and 1 tablet before bedtime. 60 tablet 3    metoprolol succinate (TOPROL XL) 50 MG extended release tablet Take 1 tablet by mouth nightly 90 tablet 5    Ubrogepant (UBRELVY) 50 MG TABS Take 1 tablet by mouth daily Take 1 tablet po PRN at start of headaches, can repeat in 24 hours 30 tablet 1    apixaban (ELIQUIS) 2.5 MG TABS tablet Take 1 tablet by mouth in the morning and 1 tablet before bedtime. 180 tablet 1    Continuous Blood Gluc Sensor (DEXCOM G6 SENSOR) MISC 1 each by Does not apply route every 14 days 3 each 1    buPROPion (ZYBAN) 150 MG extended release tablet Take 1 tablet by mouth 2 times daily 60 tablet 5    clotrimazole-betamethasone (LOTRISONE) 1-0.05 % cream Apply topically 2 times daily.  5 g 5    Continuous Blood Gluc  (DEXCOM G6 ) DAYO Change sensor every 10 days 1 each 0    albuterol sulfate  (90 Base) MCG/ACT inhaler INHALE 2 PUFFS INTO THE LUNGS EVERY 4 HOURS AS NEEDED FOR WHEEZING OR SHORTNESS OF BREATH 54 g 5    ASPIRIN LOW DOSE 81 MG EC tablet TAKE 1 TABLET BY MOUTH DAILY 90 tablet 5    Continuous Blood Gluc  (FREESTYLE LISSETT 14 DAY READER) DAYO 1 each by Does not apply route 3 times daily 1 each 1    Continuous Blood Gluc Sensor (FREESTYLE LISSETT 14 DAY SENSOR) MISC 1 each by Does not apply route every 14 days 2 each 5    Continuous Blood Gluc Transmit (DEXCOM G6 TRANSMITTER) MISC 1 each by Does not apply route daily 1 each 2    Continuous Blood Gluc  (DEXCOM G6 ) DAYO 1 each by Does not apply route daily 1 each 1    albuterol (PROVENTIL) (2.5 MG/3ML) 0.083% nebulizer solution TAKE 3 MLS BY NEBULIZATION EVERY 4 HOURS AS NEEDED FOR WHEEZING 360 mL 5    ULTICARE MINI PEN NEEDLES 31G X 6 MM MISC USE WITH INSULINS FOUR TIMES A DAY 90 each 1    insulin glargine (BASAGLAR KWIKPEN) 100 UNIT/ML injection pen Inject 8 Units into the skin 2 times daily 5 pen 3    isosorbide mononitrate (IMDUR) 60 MG extended release tablet Take 1 tablet by mouth daily 90 tablet 5    nitroGLYCERIN (NITRODUR) 0.1 MG/HR Place 1 patch onto the skin every 24 hours 30 patch 0    docusate sodium (COLACE) 100 MG capsule Take 100 mg by mouth 2 times daily      atorvastatin (LIPITOR) 80 MG tablet TAKE 1 TABLET BY MOUTH NIGHTLY 90 tablet 5    ranolazine (RANEXA) 1000 MG extended release tablet Take 1 tablet by mouth 2 times daily 60 tablet 8    linaclotide (LINZESS) 145 MCG capsule Take 1 capsule by mouth every morning (before breakfast) 30 capsule 5    nitroGLYCERIN (NITROSTAT) 0.4 MG SL tablet Place 1 tablet under the tongue every 5 minutes as needed for Chest pain up to max of 3 total doses. If no relief after 1 dose, call 911. 25 tablet 3     No current facility-administered medications for this visit. I will continue her current medication regimen  which is part of the above treatment schedule. It has been helping with Ms. Meza's chronic  medical problems which for this visit include: The primary encounter diagnosis was Encounter for therapeutic drug monitoring.  Diagnoses of Chronic pain syndrome, Fibromyalgia, DDD (degenerative disc disease), lumbar, Chronic painful diabetic neuropathy ([address] Tendinitis of right rotator cuff, Rotator cuff tendonitis, right, DDD (degenerative disc disease), cervical, Intractable migraine with aura without status migrainosus, Primary insomnia, Gastro-esophageal reflux disease without esophagitis, Headache, chronic migraine without aura, intractable, with status, Moderate episode of recurrent major depressive disorder ([address] and Intractable migraine with aura with status migrainosus were also pertinent to this visit. Risks and benefits of the medications and other alternative treatments  including no treatment were discussed with the patient. The common side effects of these medications were also explained to the patient. Informed verbal consent was obtained. Goals of current treatment regimen include improvement in pain, restoration of functioning- with focus on improvement in physical performance, general activity, work or disability,emotional distress, health care utilization and  decreased medication consumption. Will continue to monitor progress towards achieving/maintaining therapeutic goals with special emphasis on  1. Improvement in perceived interfernce  of pain with ADL's. Ability to do home exercises independently. Ability to do household chores indoor and/or outdoor work and social and leisure activities. Improve psychosocial and physical functioning. - she is showing progression towards this treatment goal with the current regimen. 2    She was advised against drinking alcohol with the narcotic pain medicines, advised against driving or handling machinery while adjusting the dose of medicines or if having cognitive  issues related to the current medications. Risk of overdose and death, if medicines not taken as prescribed, were also discussed. If the patient develops new symptoms or if the symptoms worsen, the patient should call the office. While transcribing every attempt was made to maintain the accuracy of the note in terms of it's contents,there may have been some errors made inadvertently.   Thank you for allowing me to participate in the care of this patient.     Saskia Chew MD.    Cc: Libra Strange MD

## 2022-08-24 ENCOUNTER — TELEPHONE (OUTPATIENT)
Dept: PAIN MANAGEMENT | Age: 67
End: 2022-08-24

## 2022-08-24 RX ORDER — DULOXETIN HYDROCHLORIDE 60 MG/1
CAPSULE, DELAYED RELEASE ORAL
Qty: 30 CAPSULE | Refills: 1 | OUTPATIENT
Start: 2022-08-24

## 2022-08-24 NOTE — TELEPHONE ENCOUNTER
Submitted PA for [facility]  Via CMM Key: [id number] STATUS: please see letter of preferred alternatives in Media. If this requires a response please respond to the pool ( P [phone] [id number] [facility]). Thank you please advise patient.

## 2022-08-25 RX ORDER — FLASH GLUCOSE SCANNING READER
1 EACH MISCELLANEOUS 3 TIMES DAILY
Qty: 1 EACH | Refills: 1 | Status: SHIPPED | OUTPATIENT
Start: 2022-08-25

## 2022-08-25 RX ORDER — FLASH GLUCOSE SENSOR
1 KIT MISCELLANEOUS
Qty: 2 EACH | Refills: 5 | Status: SHIPPED | OUTPATIENT
Start: 2022-08-25

## 2022-08-27 ENCOUNTER — HOSPITAL ENCOUNTER (OUTPATIENT)
Age: 67
Setting detail: OBSERVATION
Discharge: HOME | End: 2022-08-30
Attending: EMERGENCY MEDICINE | Admitting: INTERNAL MEDICINE
Payer: COMMERCIAL

## 2022-08-27 ENCOUNTER — APPOINTMENT (OUTPATIENT)
Dept: GENERAL RADIOLOGY | Age: 67
End: 2022-08-27
Payer: COMMERCIAL

## 2022-08-27 DIAGNOSIS — R07.9 CHEST PAIN, UNSPECIFIED TYPE: Primary | ICD-10-CM

## 2022-08-27 LAB
ANION GAP SERPL CALCULATED.3IONS-SCNC: 15 MMOL/L (ref 3–16)
BASOPHILS ABSOLUTE: 0 K/UL (ref 0–0.2)
BASOPHILS RELATIVE PERCENT: 0.5 %
BUN BLDV-MCNC: 23 MG/DL (ref 7–20)
CALCIUM SERPL-MCNC: 9.2 MG/DL (ref 8.3–10.6)
CHLORIDE BLD-SCNC: 101 MMOL/L (ref 99–110)
CO2: 20 MMOL/L (ref 21–32)
CREAT SERPL-MCNC: 1.4 MG/DL (ref 0.6–1.2)
EOSINOPHILS ABSOLUTE: 0.1 K/UL (ref 0–0.6)
EOSINOPHILS RELATIVE PERCENT: 2.5 %
FERRITIN: 62.7 NG/ML (ref 15–150)
FOLATE: 12 NG/ML (ref 4.78–24.2)
GFR AFRICAN AMERICAN: 46
GFR NON-AFRICAN AMERICAN: 38
GLUCOSE BLD-MCNC: 186 MG/DL (ref 70–99)
GLUCOSE BLD-MCNC: 187 MG/DL (ref 70–99)
GLUCOSE BLD-MCNC: 190 MG/DL (ref 70–99)
HCT VFR BLD CALC: 31.7 % (ref 36–48)
HEMOGLOBIN: 10.7 G/DL (ref 12–16)
IRON SATURATION: 22 % (ref 15–50)
IRON: 63 UG/DL (ref 37–145)
LYMPHOCYTES ABSOLUTE: 0.9 K/UL (ref 1–5.1)
LYMPHOCYTES RELATIVE PERCENT: 18.8 %
MCH RBC QN AUTO: 30.8 PG (ref 26–34)
MCHC RBC AUTO-ENTMCNC: 33.8 G/DL (ref 31–36)
MCV RBC AUTO: 90.9 FL (ref 80–100)
MONOCYTES ABSOLUTE: 0.3 K/UL (ref 0–1.3)
MONOCYTES RELATIVE PERCENT: 5.9 %
NEUTROPHILS ABSOLUTE: 3.7 K/UL (ref 1.7–7.7)
NEUTROPHILS RELATIVE PERCENT: 72.3 %
PDW BLD-RTO: 14.4 % (ref 12.4–15.4)
PERFORMED ON: ABNORMAL
PERFORMED ON: ABNORMAL
PLATELET # BLD: 214 K/UL (ref 135–450)
PMV BLD AUTO: 7.4 FL (ref 5–10.5)
POTASSIUM REFLEX MAGNESIUM: 4.1 MMOL/L (ref 3.5–5.1)
RBC # BLD: 3.49 M/UL (ref 4–5.2)
SODIUM BLD-SCNC: 136 MMOL/L (ref 136–145)
TOTAL IRON BINDING CAPACITY: 283 UG/DL (ref 260–445)
TROPONIN: <0.01 NG/ML
TROPONIN: <0.01 NG/ML
VITAMIN B-12: 1600 PG/ML (ref 211–911)
WBC # BLD: 5 K/UL (ref 4–11)

## 2022-08-27 PROCEDURE — 6360000002 HC RX W HCPCS: Performed by: EMERGENCY MEDICINE

## 2022-08-27 PROCEDURE — 93005 ELECTROCARDIOGRAM TRACING: CPT | Performed by: EMERGENCY MEDICINE

## 2022-08-27 PROCEDURE — G0378 HOSPITAL OBSERVATION PER HR: HCPCS

## 2022-08-27 PROCEDURE — 6360000002 HC RX W HCPCS: Performed by: INTERNAL MEDICINE

## 2022-08-27 PROCEDURE — 83540 ASSAY OF IRON: CPT

## 2022-08-27 PROCEDURE — 96375 TX/PRO/DX INJ NEW DRUG ADDON: CPT

## 2022-08-27 PROCEDURE — 82728 ASSAY OF FERRITIN: CPT

## 2022-08-27 PROCEDURE — 80048 BASIC METABOLIC PNL TOTAL CA: CPT

## 2022-08-27 PROCEDURE — 84484 ASSAY OF TROPONIN QUANT: CPT

## 2022-08-27 PROCEDURE — 36415 COLL VENOUS BLD VENIPUNCTURE: CPT

## 2022-08-27 PROCEDURE — 82607 VITAMIN B-12: CPT

## 2022-08-27 PROCEDURE — 2580000003 HC RX 258: Performed by: INTERNAL MEDICINE

## 2022-08-27 PROCEDURE — 85025 COMPLETE CBC W/AUTO DIFF WBC: CPT

## 2022-08-27 PROCEDURE — 6370000000 HC RX 637 (ALT 250 FOR IP): Performed by: INTERNAL MEDICINE

## 2022-08-27 PROCEDURE — 83550 IRON BINDING TEST: CPT

## 2022-08-27 PROCEDURE — 96376 TX/PRO/DX INJ SAME DRUG ADON: CPT

## 2022-08-27 PROCEDURE — 83036 HEMOGLOBIN GLYCOSYLATED A1C: CPT

## 2022-08-27 PROCEDURE — 6370000000 HC RX 637 (ALT 250 FOR IP): Performed by: EMERGENCY MEDICINE

## 2022-08-27 PROCEDURE — 96374 THER/PROPH/DIAG INJ IV PUSH: CPT

## 2022-08-27 PROCEDURE — 99285 EMERGENCY DEPT VISIT HI MDM: CPT

## 2022-08-27 PROCEDURE — 71046 X-RAY EXAM CHEST 2 VIEWS: CPT

## 2022-08-27 PROCEDURE — 82746 ASSAY OF FOLIC ACID SERUM: CPT

## 2022-08-27 RX ORDER — ONDANSETRON 2 MG/ML
4 INJECTION INTRAMUSCULAR; INTRAVENOUS EVERY 6 HOURS PRN
Status: DISCONTINUED | OUTPATIENT
Start: 2022-08-27 | End: 2022-08-30 | Stop reason: HOSPADM

## 2022-08-27 RX ORDER — LIDOCAINE HYDROCHLORIDE 10 MG/ML
5 INJECTION, SOLUTION EPIDURAL; INFILTRATION; INTRACAUDAL; PERINEURAL ONCE
Status: DISCONTINUED | OUTPATIENT
Start: 2022-08-27 | End: 2022-08-30 | Stop reason: HOSPADM

## 2022-08-27 RX ORDER — MORPHINE SULFATE 2 MG/ML
2 INJECTION, SOLUTION INTRAMUSCULAR; INTRAVENOUS
Status: DISCONTINUED | OUTPATIENT
Start: 2022-08-27 | End: 2022-08-30 | Stop reason: HOSPADM

## 2022-08-27 RX ORDER — BUPROPION HYDROCHLORIDE 150 MG/1
150 TABLET, EXTENDED RELEASE ORAL 2 TIMES DAILY
Status: DISCONTINUED | OUTPATIENT
Start: 2022-08-27 | End: 2022-08-30 | Stop reason: HOSPADM

## 2022-08-27 RX ORDER — QUETIAPINE FUMARATE 25 MG/1
25 TABLET, FILM COATED ORAL NIGHTLY
Status: DISCONTINUED | OUTPATIENT
Start: 2022-08-27 | End: 2022-08-30 | Stop reason: HOSPADM

## 2022-08-27 RX ORDER — OXYCODONE AND ACETAMINOPHEN 7.5; 325 MG/1; MG/1
1 TABLET ORAL EVERY 8 HOURS PRN
Status: DISCONTINUED | OUTPATIENT
Start: 2022-08-27 | End: 2022-08-30 | Stop reason: HOSPADM

## 2022-08-27 RX ORDER — ATORVASTATIN CALCIUM 40 MG/1
40 TABLET, FILM COATED ORAL NIGHTLY
Status: DISCONTINUED | OUTPATIENT
Start: 2022-08-27 | End: 2022-08-30 | Stop reason: HOSPADM

## 2022-08-27 RX ORDER — DEXTROSE MONOHYDRATE 100 MG/ML
INJECTION, SOLUTION INTRAVENOUS CONTINUOUS PRN
Status: DISCONTINUED | OUTPATIENT
Start: 2022-08-27 | End: 2022-08-30 | Stop reason: HOSPADM

## 2022-08-27 RX ORDER — OXYCODONE HYDROCHLORIDE AND ACETAMINOPHEN 5; 325 MG/1; MG/1
1 TABLET ORAL ONCE
Status: COMPLETED | OUTPATIENT
Start: 2022-08-27 | End: 2022-08-27

## 2022-08-27 RX ORDER — POLYETHYLENE GLYCOL 3350 17 G/17G
17 POWDER, FOR SOLUTION ORAL DAILY PRN
Status: DISCONTINUED | OUTPATIENT
Start: 2022-08-27 | End: 2022-08-30 | Stop reason: HOSPADM

## 2022-08-27 RX ORDER — PANTOPRAZOLE SODIUM 20 MG/1
20 TABLET, DELAYED RELEASE ORAL
Status: DISCONTINUED | OUTPATIENT
Start: 2022-08-28 | End: 2022-08-30 | Stop reason: HOSPADM

## 2022-08-27 RX ORDER — SODIUM CHLORIDE 0.9 % (FLUSH) 0.9 %
5-40 SYRINGE (ML) INJECTION EVERY 12 HOURS SCHEDULED
Status: DISCONTINUED | OUTPATIENT
Start: 2022-08-27 | End: 2022-08-30 | Stop reason: HOSPADM

## 2022-08-27 RX ORDER — MORPHINE SULFATE 4 MG/ML
4 INJECTION, SOLUTION INTRAMUSCULAR; INTRAVENOUS
Status: DISCONTINUED | OUTPATIENT
Start: 2022-08-27 | End: 2022-08-30 | Stop reason: HOSPADM

## 2022-08-27 RX ORDER — SODIUM CHLORIDE 0.9 % (FLUSH) 0.9 %
5-40 SYRINGE (ML) INJECTION PRN
Status: DISCONTINUED | OUTPATIENT
Start: 2022-08-27 | End: 2022-08-30 | Stop reason: HOSPADM

## 2022-08-27 RX ORDER — ACETAMINOPHEN 650 MG/1
650 SUPPOSITORY RECTAL EVERY 6 HOURS PRN
Status: DISCONTINUED | OUTPATIENT
Start: 2022-08-27 | End: 2022-08-30 | Stop reason: HOSPADM

## 2022-08-27 RX ORDER — INSULIN GLARGINE 100 [IU]/ML
8 INJECTION, SOLUTION SUBCUTANEOUS 2 TIMES DAILY
Status: DISCONTINUED | OUTPATIENT
Start: 2022-08-27 | End: 2022-08-30 | Stop reason: HOSPADM

## 2022-08-27 RX ORDER — ONDANSETRON 4 MG/1
4 TABLET, ORALLY DISINTEGRATING ORAL EVERY 8 HOURS PRN
Status: DISCONTINUED | OUTPATIENT
Start: 2022-08-27 | End: 2022-08-30 | Stop reason: HOSPADM

## 2022-08-27 RX ORDER — MORPHINE SULFATE 2 MG/ML
2 INJECTION, SOLUTION INTRAMUSCULAR; INTRAVENOUS ONCE
Status: COMPLETED | OUTPATIENT
Start: 2022-08-27 | End: 2022-08-27

## 2022-08-27 RX ORDER — AMLODIPINE BESYLATE 10 MG/1
10 TABLET ORAL DAILY
Status: DISCONTINUED | OUTPATIENT
Start: 2022-08-27 | End: 2022-08-30 | Stop reason: HOSPADM

## 2022-08-27 RX ORDER — ISOSORBIDE MONONITRATE 60 MG/1
60 TABLET, EXTENDED RELEASE ORAL DAILY
Status: DISCONTINUED | OUTPATIENT
Start: 2022-08-27 | End: 2022-08-30 | Stop reason: HOSPADM

## 2022-08-27 RX ORDER — SODIUM CHLORIDE 9 MG/ML
25 INJECTION, SOLUTION INTRAVENOUS PRN
Status: DISCONTINUED | OUTPATIENT
Start: 2022-08-27 | End: 2022-08-30 | Stop reason: HOSPADM

## 2022-08-27 RX ORDER — METOPROLOL SUCCINATE 50 MG/1
100 TABLET, EXTENDED RELEASE ORAL NIGHTLY
Status: DISCONTINUED | OUTPATIENT
Start: 2022-08-27 | End: 2022-08-30 | Stop reason: HOSPADM

## 2022-08-27 RX ORDER — INSULIN LISPRO 100 [IU]/ML
0-8 INJECTION, SOLUTION INTRAVENOUS; SUBCUTANEOUS
Status: DISCONTINUED | OUTPATIENT
Start: 2022-08-27 | End: 2022-08-30 | Stop reason: HOSPADM

## 2022-08-27 RX ORDER — ASPIRIN 81 MG/1
81 TABLET, CHEWABLE ORAL DAILY
Status: DISCONTINUED | OUTPATIENT
Start: 2022-08-28 | End: 2022-08-29

## 2022-08-27 RX ORDER — ACETAMINOPHEN 325 MG/1
650 TABLET ORAL EVERY 6 HOURS PRN
Status: DISCONTINUED | OUTPATIENT
Start: 2022-08-27 | End: 2022-08-30 | Stop reason: HOSPADM

## 2022-08-27 RX ORDER — SODIUM CHLORIDE 9 MG/ML
INJECTION, SOLUTION INTRAVENOUS PRN
Status: DISCONTINUED | OUTPATIENT
Start: 2022-08-27 | End: 2022-08-30 | Stop reason: HOSPADM

## 2022-08-27 RX ORDER — INSULIN LISPRO 100 [IU]/ML
0-4 INJECTION, SOLUTION INTRAVENOUS; SUBCUTANEOUS NIGHTLY
Status: DISCONTINUED | OUTPATIENT
Start: 2022-08-27 | End: 2022-08-30 | Stop reason: HOSPADM

## 2022-08-27 RX ORDER — METOPROLOL SUCCINATE 50 MG/1
50 TABLET, EXTENDED RELEASE ORAL NIGHTLY
Status: DISCONTINUED | OUTPATIENT
Start: 2022-08-27 | End: 2022-08-27

## 2022-08-27 RX ADMIN — BUPROPION HYDROCHLORIDE 150 MG: 150 TABLET, EXTENDED RELEASE ORAL at 21:53

## 2022-08-27 RX ADMIN — INSULIN GLARGINE 8 UNITS: 100 INJECTION, SOLUTION SUBCUTANEOUS at 22:01

## 2022-08-27 RX ADMIN — OXYCODONE HYDROCHLORIDE AND ACETAMINOPHEN 1 TABLET: 5; 325 TABLET ORAL at 13:15

## 2022-08-27 RX ADMIN — ATORVASTATIN CALCIUM 40 MG: 40 TABLET, FILM COATED ORAL at 21:53

## 2022-08-27 RX ADMIN — QUETIAPINE FUMARATE 25 MG: 25 TABLET ORAL at 21:53

## 2022-08-27 RX ADMIN — SODIUM CHLORIDE, PRESERVATIVE FREE 10 ML: 5 INJECTION INTRAVENOUS at 22:51

## 2022-08-27 RX ADMIN — SODIUM CHLORIDE, PRESERVATIVE FREE 10 ML: 5 INJECTION INTRAVENOUS at 22:50

## 2022-08-27 RX ADMIN — NITROGLYCERIN 0.5 INCH: 20 OINTMENT TOPICAL at 11:02

## 2022-08-27 RX ADMIN — AMLODIPINE BESYLATE 10 MG: 10 TABLET ORAL at 17:47

## 2022-08-27 RX ADMIN — MORPHINE SULFATE 4 MG: 4 INJECTION, SOLUTION INTRAMUSCULAR; INTRAVENOUS at 21:53

## 2022-08-27 RX ADMIN — ISOSORBIDE MONONITRATE 60 MG: 60 TABLET, EXTENDED RELEASE ORAL at 17:47

## 2022-08-27 RX ADMIN — ONDANSETRON 4 MG: 2 INJECTION INTRAMUSCULAR; INTRAVENOUS at 15:37

## 2022-08-27 RX ADMIN — APIXABAN 2.5 MG: 2.5 TABLET, FILM COATED ORAL at 21:53

## 2022-08-27 RX ADMIN — MORPHINE SULFATE 2 MG: 2 INJECTION, SOLUTION INTRAMUSCULAR; INTRAVENOUS at 12:08

## 2022-08-27 RX ADMIN — OXYCODONE AND ACETAMINOPHEN 1 TABLET: 7.5; 325 TABLET ORAL at 17:47

## 2022-08-27 ASSESSMENT — PAIN DESCRIPTION - DESCRIPTORS
DESCRIPTORS: ACHING
DESCRIPTORS: HEAVINESS
DESCRIPTORS: ACHING;DISCOMFORT;SHOOTING
DESCRIPTORS: ACHING;DISCOMFORT
DESCRIPTORS: ACHING;DISCOMFORT
DESCRIPTORS: SHOOTING;STABBING
DESCRIPTORS: SHOOTING;STABBING

## 2022-08-27 ASSESSMENT — HEART SCORE: ECG: 1

## 2022-08-27 ASSESSMENT — PAIN DESCRIPTION - ORIENTATION
ORIENTATION: LEFT

## 2022-08-27 ASSESSMENT — PAIN DESCRIPTION - ONSET
ONSET: ON-GOING
ONSET: PROGRESSIVE
ONSET: ON-GOING

## 2022-08-27 ASSESSMENT — PAIN DESCRIPTION - PAIN TYPE
TYPE: ACUTE PAIN
TYPE: ACUTE PAIN

## 2022-08-27 ASSESSMENT — PAIN SCALES - GENERAL
PAINLEVEL_OUTOF10: 7
PAINLEVEL_OUTOF10: 8
PAINLEVEL_OUTOF10: 8
PAINLEVEL_OUTOF10: 9
PAINLEVEL_OUTOF10: 8
PAINLEVEL_OUTOF10: 4
PAINLEVEL_OUTOF10: 10
PAINLEVEL_OUTOF10: 10
PAINLEVEL_OUTOF10: 7
PAINLEVEL_OUTOF10: 7

## 2022-08-27 ASSESSMENT — PAIN DESCRIPTION - LOCATION
LOCATION: CHEST;BACK
LOCATION: CHEST
LOCATION: CHEST;ARM
LOCATION: CHEST
LOCATION: CHEST
LOCATION: CHEST;ARM
LOCATION: CHEST
LOCATION: CHEST

## 2022-08-27 ASSESSMENT — PAIN DESCRIPTION - FREQUENCY
FREQUENCY: CONTINUOUS
FREQUENCY: CONTINUOUS

## 2022-08-27 ASSESSMENT — PAIN - FUNCTIONAL ASSESSMENT
PAIN_FUNCTIONAL_ASSESSMENT: 0-10
PAIN_FUNCTIONAL_ASSESSMENT: ACTIVITIES ARE NOT PREVENTED
PAIN_FUNCTIONAL_ASSESSMENT: ACTIVITIES ARE NOT PREVENTED

## 2022-08-27 ASSESSMENT — ENCOUNTER SYMPTOMS
NAUSEA: 1
SHORTNESS OF BREATH: 0
VOMITING: 0

## 2022-08-27 ASSESSMENT — PAIN DESCRIPTION - DIRECTION: RADIATING_TOWARDS: ARM

## 2022-08-27 NOTE — PROGRESS NOTES
Pharmacy Medication Reconciliation Note     List of medications David Lorna is currently taking is complete. Source of information:   1. Conversation with patient by phone  2. EMR    Notes regarding home medications:   1. Patient reports taking no meds PTA in the ED  2.  Anticoagulated with Eliquis 2.5 mg (0 of 2 taken PTA) for history of Watchman device placement     Patient denies taking any OTC or herbal medications    Kika Garcia, Pharmacy Intern  8/27/2022  4:10 PM

## 2022-08-27 NOTE — H&P
Hospital Medicine History & Physical      PCP: Scotty Burr MD    Date of Admission: 8/27/2022    Date of Service: Pt seen/examined on 08/27/22   and  Placed in Observation. Chief Complaint:    Chief Complaint   Patient presents with    Chest Pain     Since 7am. Took nitro with some relief         History Of Present Illness: 59-year-old female with past medical history significant for insulin-dependent diabetes, CAD with multiple stents, atrial fibrillation on chronic anticoagulation with Eliquis presents to outside emergency department complaints of chest pain. Patient reports waking up with chest pain this morning, 10 out of 10, substernal, pressure-like, no radiation. Patient was afraid she was having a heart attack and therefore presented to the emergency department    Emergency department work-up was unremarkable. Troponin was negative EKG with no new changes, proBNP level 1338.     Past Medical History:          Diagnosis Date    Acid reflux     Anemia     Anxiety and depression     Arthritis     Asthma     Atrial fibrillation (HCC)     CAD (coronary artery disease) 12/3/2012    Cerebral artery occlusion with cerebral infarction [facility])     TIA\"\"S--right sided weakness & headache    CHF (congestive heart failure) ([facility])     Chronic kidney disease--stage III     40% kidney function    COPD (chronic obstructive pulmonary disease) ([facility])     DM2 (diabetes mellitus, type 2) ([address]     Dysarthria     Fibromyalgia 6/7/2016    Headache(784.0) 2/19/2013    Hemisensory loss     History of blood transfusion 11/2020    pt denies having transfusion reaction    Hyperlipidemia     Hypertension     IBS (irritable bowel syndrome)     Inferior vena cava occlusion (HCC)     Keratitis     Meningioma ([facility])     MI, old     Neuropathy     Superior vena cava obstruction     Temporal arteritis ([facility] 2/24/2014    Wears glasses        Past Surgical History:          Procedure Laterality Date    ABLATION OF DYSRHYTHMIC FOCUS  1999  and 11/2020    times 2    ARTERY BIOPSY Right 04/23/2014    RIGHT TEMPORAL ARTERY BIOPSY    CAROTID ARTERY SURGERY Left     clean up per pt    CATARACT REMOVAL Bilateral     CHOLECYSTECTOMY      COLONOSCOPY N/A 4/9/2021    COLONOSCOPY WITH BIOPSY performed by Pepper Correa MD at [facility] [address] [facility] N/A 10/15/2021    COLONOSCOPY performed by Pepper Correa MD at [facility] (CERVIX STATUS UNKNOWN)      JOINT REPLACEMENT Right     KNEE ARTHROSCOPY Right     PTCA  10/2019    LAD and RCA inrtervention    TUNNELED VENOUS PORT PLACEMENT      left thigh. SMART PORT-----Removed--total of 4 port placement and removal    UPPER GASTROINTESTINAL ENDOSCOPY N/A 7/6/2020    EGD DIAGNOSTIC ONLY performed by Andreas Maynard MD at [facility]       Medications Prior to Admission:      Prior to Admission medications    Medication Sig Start Date End Date Taking? Authorizing Provider   Continuous Blood Gluc  (FREESTYLE LISSETT 14 DAY READER) DAYO 1 each by Does not apply route 3 times daily 8/25/22   Beatriz Vega MD   Continuous Blood Gluc Sensor (FREESTYLE LISSETT 14 DAY SENSOR) MISC 1 each by Does not apply route every 14 days 8/25/22   Beatriz Vega MD   oxyCODONE-acetaminophen (PERCOCET) 7.5-325 MG per tablet Take 1 tablet by mouth every 6 hours as needed for Pain (max 3-4 per day) for up to 28 days.  8/23/22 9/20/22  Kimber Raymundo MD   DULoxetine (CYMBALTA) 60 MG extended release capsule Take 1 capsule by mouth daily 8/23/22   Kimber Raymundo MD   QUEtiapine (SEROQUEL) 25 MG tablet Take 1-2 tablets by mouth nightly 8/23/22   Kimber Raymundo MD   tiZANidine (ZANAFLEX) 4 MG tablet Take 0.5-1 tablets by mouth 2 times daily as needed (muscle spasms) 8/23/22   Kimber Raymundo MD   Galcanezumab-gnlm (EMGALITY) 120 MG/ML SOAJ Inject 2 pens into the skin every 30 days 8/23/22   Kimber Raymundo MD   montelukast (SINGULAIR) 10 MG tablet TAKE 1 TABLET BY MOUTH DAILY 8/15/22   Lindsay Stephens MD   omeprazole (PRILOSEC) 20 MG delayed release capsule TAKE 1 CAPSULE BY MOUTH DAILY 8/15/22   Lindsay Stephens MD   amLODIPine (NORVASC) 10 MG tablet Take 1 tablet by mouth in the morning. 8/4/22   Lindsay Stephens MD   labetalol (NORMODYNE) 200 MG tablet Take 1 tablet by mouth in the morning and 1 tablet before bedtime. 8/4/22   Lindsay Stephens MD   metoprolol succinate (TOPROL XL) 50 MG extended release tablet Take 1 tablet by mouth nightly 8/4/22   Lindsay Stephens MD   Ubrogepant (UBRELVY) 50 MG TABS Take 1 tablet by mouth daily Take 1 tablet po PRN at start of headaches, can repeat in 24 hours 8/4/22   Lindsay Stephens MD   apixaban (ELIQUIS) 2.5 MG TABS tablet Take 1 tablet by mouth in the morning and 1 tablet before bedtime. 8/2/22   Brandi Mcmahan MD   Continuous Blood Gluc Sensor (DEXCOM G6 SENSOR) MISC 1 each by Does not apply route every 14 days 6/3/22   Lindsay Stephens MD   buPROPion (ZYBAN) 150 MG extended release tablet Take 1 tablet by mouth 2 times daily 5/25/22   Lindsay Stephens MD   clotrimazole-betamethasone (LOTRISONE) 1-0.05 % cream Apply topically 2 times daily.  5/25/22   Lindsay Stephens MD   Continuous Blood Gluc  (DEXCOM G6 ) DAYO Change sensor every 10 days 5/25/22   Lindsay Stephens MD   albuterol sulfate  (90 Base) MCG/ACT inhaler INHALE 2 PUFFS INTO THE LUNGS EVERY 4 HOURS AS NEEDED FOR WHEEZING OR SHORTNESS OF BREATH 5/19/22   Marin Olivares MD   ASPIRIN LOW DOSE 81 MG EC tablet TAKE 1 TABLET BY MOUTH DAILY 4/19/22   Lindsay Stephens MD   Continuous Blood Gluc Transmit (DEXCOM G6 TRANSMITTER) MISC 1 each by Does not apply route daily 3/15/22   Lindsay Stephens MD   Continuous Blood Gluc  (Brooke Walker) [address] 1 each by Does not apply route daily 3/15/22   Lindsay Stephens MD   albuterol (PROVENTIL) (2.5 MG/3ML) 0.083% nebulizer solution TAKE 3 MLS BY NEBULIZATION EVERY 4 HOURS AS NEEDED FOR WHEEZING 3/14/22   Bonilla Fleming MD   ULTICARE MINI PEN NEEDLES 31G X 6 MM MISC USE WITH INSULINS FOUR TIMES A DAY 3/14/22   Bonilla Fleming MD   insulin glargine (BASAGLAR KWIKPEN) 100 UNIT/ML injection pen Inject 8 Units into the skin 2 times daily 1/27/22   Bonilla Fleming MD   isosorbide mononitrate (IMDUR) 60 MG extended release tablet Take 1 tablet by mouth daily 1/27/22   Bonilla Fleming MD   nitroGLYCERIN (NITRODUR) 0.1 MG/HR Place 1 patch onto the skin every 24 hours 1/27/22   Bonilla Fleming MD   docusate sodium (COLACE) 100 MG capsule Take 100 mg by mouth 2 times daily 11/29/21   Historical Provider, MD   atorvastatin (LIPITOR) 80 MG tablet TAKE 1 TABLET BY MOUTH NIGHTLY 11/2/21   Bonilla Fleming MD   ranolazine (RANEXA) 1000 MG extended release tablet Take 1 tablet by mouth 2 times daily 10/29/21   Ranjana Lizama MD   linaclotide [facility]) 145 MCG capsule Take 1 capsule by mouth every morning (before breakfast) 10/15/21   Sheryl Camp MD   nitroGLYCERIN (NITROSTAT) 0.4 MG SL tablet Place 1 tablet under the tongue every 5 minutes as needed for Chest pain up to max of 3 total doses. If no relief after 1 dose, call 911. 12/16/20   Bonilla Fleming MD       Allergies:  Patient has no known allergies. Social History:      The patient currently lives home    TOBACCO:   reports that she quit smoking about 4 years ago. Her smoking use included cigarettes. She has a 17.50 pack-year smoking history. She has never used smokeless tobacco.  ETOH:   reports no history of alcohol use. E-cigarette/Vaping       Questions Responses    E-cigarette/Vaping Use Never User    Start Date     Passive Exposure No    Quit Date     Counseling Given Yes    Comments               Family History:       Reviewed and negative in regards to presenting illness/complaint.         Problem Relation Age of Onset    Diabetes Mother     High Cholesterol Mother     Stroke Mother     Cancer Mother     High Blood Pressure Mother     No Known Problems Paternal Grandfather         lung issues        REVIEW OF SYSTEMS COMPLETED:   Pertinent positives as noted in the HPI. All other systems reviewed and negative. PHYSICAL EXAM PERFORMED:    BP (!) 150/83   Pulse 87   Temp 98.4 °F (36.9 °C) (Oral)   Resp 18   Wt 120 lb 9.5 oz (54.7 kg)   SpO2 98%   BMI 23.55 kg/m²     General appearance:  No apparent distress, appears stated age and cooperative. HEENT:  Normal cephalic, atraumatic without obvious deformity. Pupils equal, round, and reactive to light. Extra ocular muscles intact. Conjunctivae/corneas clear. Neck: Supple, with full range of motion. No jugular venous distention. Trachea midline. Respiratory:  Normal respiratory effort. Clear to auscultation, bilaterally without Rales/Wheezes/Rhonchi. Cardiovascular:  Regular rate and rhythm with normal S1/S2 without murmurs, rubs or gallops. Abdomen: Soft, non-tender, non-distended with normal bowel sounds. Musculoskeletal:  No clubbing, cyanosis or edema bilaterally. Full range of motion without deformity. Skin: Skin color, texture, turgor normal.  No rashes or lesions. Neurologic:  Neurovascularly intact without any focal sensory/motor deficits. Cranial nerves: II-XII intact, grossly non-focal.  Psychiatric:  Alert and oriented, thought content appropriate, normal insight  Capillary Refill: Brisk,3 seconds, normal  Peripheral Pulses: +2 palpable, equal bilaterally       Labs:     Recent Labs     08/27/22  1056   WBC 5.0   HGB 10.7*   HCT 31.7*        Recent Labs     08/27/22  1056      K 4.1      CO2 20*   BUN 23*   CREATININE 1.4*   CALCIUM 9.2     No results for input(s): AST, ALT, BILIDIR, BILITOT, ALKPHOS in the last 72 hours. No results for input(s): INR in the last 72 hours.   Recent Labs     08/27/22  1056   TROPONINI <0.01       Urinalysis:      Lab Results   Component Value Date/Time    NITRU Negative 05/11/2022 09:15 AM    WBCUA 1 05/11/2022 09:15 AM    BACTERIA None Seen 05/11/2022 09:15 AM    RBCUA 1 05/11/2022 09:15 AM    BLOODU Negative 05/11/2022 09:15 AM    SPECGRAV 1.020 05/11/2022 09:15 AM    GLUCOSEU Negative 05/11/2022 09:15 AM    GLUCOSEU NEGATIVE 05/14/2012 03:29 PM       Radiology:     CXR: I have reviewed the CXR with the following interpretation: No acute findings  EKG:  I have reviewed the EKG with the following interpretation: No acute changes    XR CHEST (2 VW)   Final Result   No acute cardiopulmonary disease. Consults:    IP CONSULT TO CARDIOLOGY    ASSESSMENT:    Active Hospital Problems    Diagnosis Date Noted    Chest pain [R07.9] 08/27/2022     Priority: Medium         PLAN:      Chest pain  CAD status post multiple stents  Admitted for observation to rule out ACS  Continue antiplatelet therapy, statin  Stress test   Cardiology  consultation    Essential Hypertension  Uncontrolled  Resume home medications  Monitor BP q4 hrs  BP goal <140/90  Adjust medications accordingly     Type 2 diabetes  Euglycemic  Hold p.o. medicines  Monitor with Accu-Cheks ACHS  Sliding scale as needed  Continue Lantus    Chronic pain Syndrome  Fibromyalgia  Opioid Dependence   Sees pain management specialist (Ethan Mishra  Resume oxycodone          DVT Prophylaxis: eliquis  Diet: No diet orders on file  Code Status: Prior    PT/OT Eval Status: requested    Dispo - ? Breanne Fritz MD    Thank you Julianna Najera MD for the opportunity to be involved in this patient's care. If you have any questions or concerns please feel free to contact me at [phone].

## 2022-08-27 NOTE — CONSULTS
Nephrology Consult Note  [facility]  [phone]   [facility]. com        Reason for Consult:  KOLBY and CKD stage 3a    HISTORY OF PRESENT ILLNESS:                This is a patient with significant past medical history of CKD stage 3a, DM2, CAD with stents, afib, and CHF who presented with chest pain. She woke up with it this morning. It was substernal and did not radiate. No N/V. She felt somewhat better with a nitro. Troponin was negative. She lost her IV and I was called for an urgent consult to evaluate her candidacy for a midline. Her pain is better since getting morphine. No leg swelling, hematuria, foamy urine. She has chronic headaches.     Past Medical History:        Diagnosis Date    Acid reflux     Anemia     Anxiety and depression     Arthritis     Asthma     Atrial fibrillation (HCC)     CAD (coronary artery disease) 12/3/2012    Cerebral artery occlusion with cerebral infarction [facility])     TIA\"\"S--right sided weakness & headache    CHF (congestive heart failure) (HCC)     Chronic kidney disease--stage III     40% kidney function    COPD (chronic obstructive pulmonary disease) ([facility])     DM2 (diabetes mellitus, type 2) ([address]     Dysarthria     Fibromyalgia 6/7/2016    Headache(784.0) 2/19/2013    Hemisensory loss     History of blood transfusion 11/2020    pt denies having transfusion reaction    Hyperlipidemia     Hypertension     IBS (irritable bowel syndrome)     Inferior vena cava occlusion (HCC)     Keratitis     Meningioma (HCC)     MI, old     Neuropathy     Superior vena cava obstruction     Temporal arteritis ([facility] [address] 2/24/2014    Wears glasses        Past Surgical History:        Procedure Laterality Date    ABLATION OF DYSRHYTHMIC FOCUS  1999  and 11/2020    times 2    ARTERY BIOPSY Right 04/23/2014    RIGHT TEMPORAL ARTERY BIOPSY    CAROTID ARTERY SURGERY Left     clean up per pt    CATARACT REMOVAL Bilateral     CHOLECYSTECTOMY      COLONOSCOPY N/A 4/9/2021 COLONOSCOPY WITH BIOPSY performed by Trisha Solorzano MD at [address] [facility] N/A 10/15/2021    COLONOSCOPY performed by Trisha Solorzano MD at [facility] (CERVIX STATUS UNKNOWN)      JOINT REPLACEMENT Right     KNEE ARTHROSCOPY Right     PTCA  10/2019    LAD and RCA inrtervention    TUNNELED VENOUS PORT PLACEMENT      left thigh. SMART PORT-----Removed--total of 4 port placement and removal    UPPER GASTROINTESTINAL ENDOSCOPY N/A 7/6/2020    EGD DIAGNOSTIC ONLY performed by Zuly Joseph MD at [address]       Current Medications:    No current facility-administered medications on file prior to encounter. Current Outpatient Medications on File Prior to Encounter   Medication Sig Dispense Refill    Continuous Blood Gluc  (FREESTYLE LISSETT 14 DAY READER) DAYO 1 each by Does not apply route 3 times daily 1 each 1    Continuous Blood Gluc Sensor (FREESTYLE LISSETT 14 DAY SENSOR) MISC 1 each by Does not apply route every 14 days 2 each 5    oxyCODONE-acetaminophen (PERCOCET) 7.5-325 MG per tablet Take 1 tablet by mouth every 6 hours as needed for Pain (max 3-4 per day) for up to 28 days. 100 tablet 0    DULoxetine (CYMBALTA) 60 MG extended release capsule Take 1 capsule by mouth daily 30 capsule 1    QUEtiapine (SEROQUEL) 25 MG tablet Take 1-2 tablets by mouth nightly 60 tablet 1    tiZANidine (ZANAFLEX) 4 MG tablet Take 0.5-1 tablets by mouth 2 times daily as needed (muscle spasms) 60 tablet 1    montelukast (SINGULAIR) 10 MG tablet TAKE 1 TABLET BY MOUTH DAILY 30 tablet 1    omeprazole (PRILOSEC) 20 MG delayed release capsule TAKE 1 CAPSULE BY MOUTH DAILY 30 capsule 1    amLODIPine (NORVASC) 10 MG tablet Take 1 tablet by mouth in the morning. 90 tablet 1    labetalol (NORMODYNE) 200 MG tablet Take 1 tablet by mouth in the morning and 1 tablet before bedtime.  60 tablet 3    metoprolol succinate (TOPROL XL) 50 MG extended release tablet Take 1 tablet by mouth nightly 90 tablet 5    Ubrogepant (UBRELVY) 50 MG TABS Take 1 tablet by mouth daily Take 1 tablet po PRN at start of headaches, can repeat in 24 hours 30 tablet 1    apixaban (ELIQUIS) 2.5 MG TABS tablet Take 1 tablet by mouth in the morning and 1 tablet before bedtime. 180 tablet 1    Continuous Blood Gluc Sensor (DEXCOM G6 SENSOR) MISC 1 each by Does not apply route every 14 days 3 each 1    buPROPion (ZYBAN) 150 MG extended release tablet Take 1 tablet by mouth 2 times daily 60 tablet 5    clotrimazole-betamethasone (LOTRISONE) 1-0.05 % cream Apply topically 2 times daily.  5 g 5    Continuous Blood Gluc  (DEXCOM G6 ) DAYO Change sensor every 10 days 1 each 0    albuterol sulfate  (90 Base) MCG/ACT inhaler INHALE 2 PUFFS INTO THE LUNGS EVERY 4 HOURS AS NEEDED FOR WHEEZING OR SHORTNESS OF BREATH 54 g 5    ASPIRIN LOW DOSE 81 MG EC tablet TAKE 1 TABLET BY MOUTH DAILY 90 tablet 5    Continuous Blood Gluc Transmit (DEXCOM G6 TRANSMITTER) MISC 1 each by Does not apply route daily 1 each 2    Continuous Blood Gluc  (DEXCOM G6 ) DAYO 1 each by Does not apply route daily 1 each 1    albuterol (PROVENTIL) (2.5 MG/3ML) 0.083% nebulizer solution TAKE 3 MLS BY NEBULIZATION EVERY 4 HOURS AS NEEDED FOR WHEEZING 360 mL 5    ULTICARE MINI PEN NEEDLES 31G X 6 MM MISC USE WITH INSULINS FOUR TIMES A DAY 90 each 1    insulin glargine (BASAGLAR KWIKPEN) 100 UNIT/ML injection pen Inject 8 Units into the skin 2 times daily 5 pen 3    isosorbide mononitrate (IMDUR) 60 MG extended release tablet Take 1 tablet by mouth daily 90 tablet 5    nitroGLYCERIN (NITRODUR) 0.1 MG/HR Place 1 patch onto the skin every 24 hours 30 patch 0    docusate sodium (COLACE) 100 MG capsule Take 100 mg by mouth 2 times daily      atorvastatin (LIPITOR) 80 MG tablet TAKE 1 TABLET BY MOUTH NIGHTLY 90 tablet 5    ranolazine (RANEXA) 1000 MG extended release tablet Take 1 tablet by mouth 2 times daily 60 tablet 8    linaclotide (LINZESS) 145 MCG capsule Take 1 capsule by mouth every morning (before breakfast) 30 capsule 5    nitroGLYCERIN (NITROSTAT) 0.4 MG SL tablet Place 1 tablet under the tongue every 5 minutes as needed for Chest pain up to max of 3 total doses. If no relief after 1 dose, call 911. 77 tablet 3       Allergies:  Patient has no known allergies. Social History:    Social History     Socioeconomic History    Marital status:       Spouse name: Not on file    Number of children: 8    Years of education: Not on file    Highest education level: Not on file   Occupational History    Not on file   Tobacco Use    Smoking status: Former     Packs/day: 0.50     Years: 35.00     Pack years: 17.50     Types: Cigarettes     Quit date: 2018     Years since quittin.1    Smokeless tobacco: Never    Tobacco comments:     5/13/15 has not smoked since hospitalization - kh   Vaping Use    Vaping Use: Never used   Substance and Sexual Activity    Alcohol use: No     Alcohol/week: 0.0 standard drinks    Drug use: Never    Sexual activity: Not Currently     Partners: Male   Other Topics Concern    Not on file   Social History Narrative    Not on file     Social Determinants of Health     Financial Resource Strain: Low Risk     Difficulty of Paying Living Expenses: Not hard at all   Food Insecurity: No Food Insecurity    Worried About Running Out of Food in the Last Year: Never true    Comfort of Food in the Last Year: Never true   Transportation Needs: Not on file   Physical Activity: Insufficiently Active    Days of Exercise per Week: 7 days    Minutes of Exercise per Session: 10 min   Stress: Not on file   Social Connections: Not on file   Intimate Partner Violence: Not on file   Housing Stability: Not on file       Family History:       Problem Relation Age of Onset    Diabetes Mother     High Cholesterol Mother     Stroke Mother     Cancer Mother     High Blood Pressure Mother     No Known Problems Paternal Grandfather         lung issues          REVIEW OF SYSTEMS:    No N/V. No abdominal pain. No diarrhea. No vision changes. No palpitations. The remainder of the ROS is negative except per HPI. PHYSICAL EXAM:    Vitals:    BP (!) 150/83   Pulse 87   Temp 98.4 °F (36.9 °C) (Oral)   Resp 18   Wt 120 lb 9.5 oz (54.7 kg)   SpO2 98%   BMI 23.55 kg/m²   No intake/output data recorded. No intake/output data recorded. Physical Exam:  Gen: Resting in bed, NAD. HEENT: MMM, OP clear. CV: RRR no m/r. No S3.  Lungs: Clear bilaterally. No rhonchi. Abd: S/NT +BS  Ext: No edema, no cyanosis  Skin: Warm. No rashes appreciated. : No TTP over bladder, nondistended. Neuro: Alert and oriented x 3, nonfocal.  Joints: No erythema noted over joints. DATA:    CBC:   Lab Results   Component Value Date/Time    WBC 5.0 08/27/2022 10:56 AM    RBC 3.49 08/27/2022 10:56 AM    HGB 10.7 08/27/2022 10:56 AM    HCT 31.7 08/27/2022 10:56 AM    MCV 90.9 08/27/2022 10:56 AM    MCH 30.8 08/27/2022 10:56 AM    MCHC 33.8 08/27/2022 10:56 AM    RDW 14.4 08/27/2022 10:56 AM     08/27/2022 10:56 AM    MPV 7.4 08/27/2022 10:56 AM     BMP:    Lab Results   Component Value Date/Time     08/27/2022 10:56 AM    K 4.1 08/27/2022 10:56 AM     08/27/2022 10:56 AM    CO2 20 08/27/2022 10:56 AM    BUN 23 08/27/2022 10:56 AM    LABALBU 3.3 04/12/2022 02:50 PM    CREATININE 1.4 08/27/2022 10:56 AM    CALCIUM 9.2 08/27/2022 10:56 AM    GFRAA 46 08/27/2022 10:56 AM    GFRAA 60 05/23/2013 02:56 PM    LABGLOM 38 08/27/2022 10:56 AM    GLUCOSE 190 08/27/2022 10:56 AM       IMPRESSION/RECOMMENDATIONS:      1. KOLBY on CKD stage 3a: She follows with me in the office, last seen in January. She has KOLBY from multiple KOLBY events in the past.  Baseline Cr is 1.2. Monitor for now. 2. CAD with chest pain: Cardiology to see. Troponin negative. Hx of multiple stents. Stress test ordered.   Not on an ACE or ARB though it may be worth seeing if she can tolerate one before discharge or afterwards. She was on one at one point but it was stopped during one of her KOLBY events. 3. Uncontrolled HTN: Resume home meds and adjust from there tomorrow. 4. Afib: She is s/p a Watchman device by Dr. Holloway Led in July. 5. Anemia: Send iron studies and B12/folate while here. Her CKD could play a part, but her hgb in the 10's is lower than I would imagine for just CKD. She certainly has some component of anemia of chronic disease as well. 6. Access: Ok for a PICC or midline in the dominant arm. I reviewed the benefits and risk of potential issues with an arm access in the future. Thank you for allowing me to participate in the care of this patient. I will continue to follow along. Please call with questions. Navin Mejia MD  [facility]  [phone]  [facility]. [facility]

## 2022-08-27 NOTE — PROGRESS NOTES
Arrived to place Midline for pt with poor access options and had failed PIV placement. Bedside RN obtained ok from Nephrology to place midline in dominant arm. Bedside RN Memo Powell verified pt and procedure. Single Lumen Midline was placed in the Right Brachial Vein without complication. Brisk blood return achieved and flushed without resistance. Handoff completed with YOLETTE Powell. Thank you for allowing our care and services.

## 2022-08-27 NOTE — ED PROVIDER NOTES
[address] [employer]      Pt Name: Avni Alston  MRN: [id number]  [place] 1955  Date of evaluation: 8/27/2022  Provider: Gloriann Kussmaul, MD    CHIEF COMPLAINT       Chief Complaint   Patient presents with    Chest Pain     Since 7am. Took nitro with some relief         HISTORY OF PRESENT ILLNESS   (Location/Symptom, Timing/Onset, Context/Setting, Quality, Duration, Modifying Factors, Severity)  Note limiting factors. Avni Alston is a 73 y.o. female who presents to the emergency department with chest pain. HPI    Is a pleasant 71-year-old -American female with history of RCA disease and stent in the past who presents with nonexertional chest pain which woke her from sleep this morning. She describes it as dull achy left-sided substernal with occasional radiation to left upper extremity. No shortness of breath but some nausea and one episode of diaphoresis. She took some of her home nitroglycerin with some relief. She had a treadmill stress test about 2 years ago which was unremarkable and she had a watchman placement earlier this year    Nursing Notes were reviewed. REVIEW OF SYSTEMS    (2-9 systems for level 4, 10 or more for level 5)     Review of Systems   Constitutional:  Negative for chills and fever. Respiratory:  Negative for shortness of breath. Cardiovascular:  Positive for chest pain. Negative for palpitations. Gastrointestinal:  Positive for nausea. Negative for vomiting. All other systems reviewed and are negative. Except as noted above the remainder of the review of systems was reviewed and negative.        PAST MEDICAL HISTORY     Past Medical History:   Diagnosis Date    Acid reflux     Anemia     Anxiety and depression     Arthritis     Asthma     Atrial fibrillation (HCC)     CAD (coronary artery disease) 12/3/2012    Cerebral artery occlusion with cerebral infarction [facility])     TIA\"\"S--right sided weakness & headache    CHF (congestive heart failure) (HCC)     Chronic kidney disease--stage III     40% kidney function    COPD (chronic obstructive pulmonary disease) (HCC)     DM2 (diabetes mellitus, type 2) ([facility] [address]     Dysarthria     Fibromyalgia 6/7/2016    Headache(784.0) 2/19/2013    Hemisensory loss     History of blood transfusion 11/2020    pt denies having transfusion reaction    Hyperlipidemia     Hypertension     IBS (irritable bowel syndrome)     Inferior vena cava occlusion (HCC)     Keratitis     Meningioma (HCC)     MI, old     Neuropathy     Superior vena cava obstruction     Temporal arteritis ([facility] [address] 2/24/2014    Wears glasses          SURGICAL HISTORY       Past Surgical History:   Procedure Laterality Date    ABLATION OF DYSRHYTHMIC FOCUS  1999  and 11/2020    times 2    ARTERY BIOPSY Right 04/23/2014    RIGHT TEMPORAL ARTERY BIOPSY    CAROTID ARTERY SURGERY Left     clean up per pt    CATARACT REMOVAL Bilateral     CHOLECYSTECTOMY      COLONOSCOPY N/A 4/9/2021    COLONOSCOPY WITH BIOPSY performed by Linh Childers MD at [address] [place] [address] N/A 10/15/2021    COLONOSCOPY performed by Linh Childers MD at [place] [address] (CERVIX STATUS UNKNOWN)      JOINT REPLACEMENT Right     KNEE ARTHROSCOPY Right     PTCA  10/2019    LAD and RCA inrtervention    TUNNELED VENOUS PORT PLACEMENT      left thigh.   SMART PORT-----Removed--total of 4 port placement and removal    UPPER GASTROINTESTINAL ENDOSCOPY N/A 7/6/2020    EGD DIAGNOSTIC ONLY performed by Dina Conway MD at [facility]       Previous Medications    ALBUTEROL (PROVENTIL) (2.5 MG/3ML) 0.083% NEBULIZER SOLUTION    TAKE 3 MLS BY NEBULIZATION EVERY 4 HOURS AS NEEDED FOR WHEEZING    ALBUTEROL SULFATE  (90 BASE) MCG/ACT INHALER    INHALE 2 PUFFS INTO THE LUNGS EVERY 4 HOURS AS NEEDED FOR WHEEZING OR SHORTNESS OF BREATH    AMLODIPINE (NORVASC) 10 MG TABLET    Take 1 tablet by mouth in the morning. APIXABAN (ELIQUIS) 2.5 MG TABS TABLET    Take 1 tablet by mouth in the morning and 1 tablet before bedtime. ASPIRIN LOW DOSE 81 MG EC TABLET    TAKE 1 TABLET BY MOUTH DAILY    ATORVASTATIN (LIPITOR) 80 MG TABLET    TAKE 1 TABLET BY MOUTH NIGHTLY    BUPROPION (ZYBAN) 150 MG EXTENDED RELEASE TABLET    Take 1 tablet by mouth 2 times daily    CLOTRIMAZOLE-BETAMETHASONE (LOTRISONE) 1-0.05 % CREAM    Apply topically 2 times daily. CONTINUOUS BLOOD GLUC  (DEXCOM G6 ) DAYO    1 each by Does not apply route daily    CONTINUOUS BLOOD GLUC  (DEXCOM G6 ) DAYO    Change sensor every 10 days    CONTINUOUS BLOOD GLUC  (FREESTYLE LISSETT 14 DAY READER) DAYO    1 each by Does not apply route 3 times daily    CONTINUOUS BLOOD GLUC SENSOR (DEXCOM G6 SENSOR) MISC    1 each by Does not apply route every 14 days    CONTINUOUS BLOOD GLUC SENSOR (FREESTYLE LISSETT 14 DAY SENSOR) MISC    1 each by Does not apply route every 14 days    CONTINUOUS BLOOD GLUC TRANSMIT (DEXCOM G6 TRANSMITTER) MISC    1 each by Does not apply route daily    DOCUSATE SODIUM (COLACE) 100 MG CAPSULE    Take 100 mg by mouth 2 times daily    DULOXETINE (CYMBALTA) 60 MG EXTENDED RELEASE CAPSULE    Take 1 capsule by mouth daily    GALCANEZUMAB-GNLM (EMGALITY) 120 MG/ML SOAJ    Inject 2 pens into the skin every 30 days    INSULIN GLARGINE (BASAGLAR KWIKPEN) 100 UNIT/ML INJECTION PEN    Inject 8 Units into the skin 2 times daily    ISOSORBIDE MONONITRATE (IMDUR) 60 MG EXTENDED RELEASE TABLET    Take 1 tablet by mouth daily    LABETALOL (NORMODYNE) 200 MG TABLET    Take 1 tablet by mouth in the morning and 1 tablet before bedtime.     LINACLOTIDE (LINZESS) 145 MCG CAPSULE    Take 1 capsule by mouth every morning (before breakfast)    METOPROLOL SUCCINATE (TOPROL XL) 50 MG EXTENDED RELEASE TABLET    Take 1 tablet by mouth nightly    MONTELUKAST (SINGULAIR) 10 MG TABLET    TAKE 1 TABLET BY MOUTH DAILY    NITROGLYCERIN (NITRODUR) 0.1 MG/HR    Place 1 patch onto the skin every 24 hours    NITROGLYCERIN (NITROSTAT) 0.4 MG SL TABLET    Place 1 tablet under the tongue every 5 minutes as needed for Chest pain up to max of 3 total doses. If no relief after 1 dose, call 911. OMEPRAZOLE (PRILOSEC) 20 MG DELAYED RELEASE CAPSULE    TAKE 1 CAPSULE BY MOUTH DAILY    OXYCODONE-ACETAMINOPHEN (PERCOCET) 7.5-325 MG PER TABLET    Take 1 tablet by mouth every 6 hours as needed for Pain (max 3-4 per day) for up to 28 days. QUETIAPINE (SEROQUEL) 25 MG TABLET    Take 1-2 tablets by mouth nightly    RANOLAZINE (RANEXA) 1000 MG EXTENDED RELEASE TABLET    Take 1 tablet by mouth 2 times daily    TIZANIDINE (ZANAFLEX) 4 MG TABLET    Take 0.5-1 tablets by mouth 2 times daily as needed (muscle spasms)    UBROGEPANT (UBRELVY) 50 MG TABS    Take 1 tablet by mouth daily Take 1 tablet po PRN at start of headaches, can repeat in 24 hours    ULTICARE MINI PEN NEEDLES 31G X 6 MM MISC    USE WITH INSULINS FOUR TIMES A DAY       ALLERGIES     Patient has no known allergies. FAMILY HISTORY       Family History   Problem Relation Age of Onset    Diabetes Mother     High Cholesterol Mother     Stroke Mother     Cancer Mother     High Blood Pressure Mother     No Known Problems Paternal Grandfather         lung issues           SOCIAL HISTORY       Social History     Socioeconomic History    Marital status:       Spouse name: None    Number of children: 8    Years of education: None    Highest education level: None   Tobacco Use    Smoking status: Former     Packs/day: 0.50     Years: 35.00     Pack years: 17.50     Types: Cigarettes     Quit date: 2018     Years since quittin.1    Smokeless tobacco: Never    Tobacco comments:     5/13/15 has not smoked since hospitalization - kh   Vaping Use    Vaping Use: Never used   Substance and Sexual Activity    Alcohol use: No     Alcohol/week: 0.0 standard drinks Drug use: Never    Sexual activity: Not Currently     Partners: Male     Social Determinants of Health     Financial Resource Strain: Low Risk     Difficulty of Paying Living Expenses: Not hard at all   Food Insecurity: No Food Insecurity    Worried About Running Out of Food in the Last Year: Never true    Ran Out of Food in the Last Year: Never true   Physical Activity: Insufficiently Active    Days of Exercise per Week: 7 days    Minutes of Exercise per Session: 10 min       SCREENINGS         Cande Coma Scale  Eye Opening: Spontaneous  Best Verbal Response: Oriented  Best Motor Response: Obeys commands  Cande Coma Scale Score: 15                     CIWA Assessment  BP: (!) 163/69  Heart Rate: 89                 PHYSICAL EXAM    (up to 7 for level 4, 8 or more for level 5)     ED Triage Vitals [08/27/22 1008]   BP Temp Temp Source Heart Rate Resp SpO2 Height Weight   (!) 163/69 98.7 °F (37.1 °C) Oral 89 20 100 % -- 120 lb 9.5 oz (54.7 kg)       Physical Exam  Constitutional:       General: She is not in acute distress. Appearance: Normal appearance. HENT:      Head: Normocephalic and atraumatic. Right Ear: Tympanic membrane and ear canal normal.      Left Ear: Tympanic membrane and ear canal normal.      Nose: Nose normal. No congestion. Mouth/Throat:      Mouth: Mucous membranes are moist.      Pharynx: No oropharyngeal exudate. Eyes:      Extraocular Movements: Extraocular movements intact. Pupils: Pupils are equal, round, and reactive to light. Cardiovascular:      Rate and Rhythm: Normal rate and regular rhythm. Pulses: Normal pulses. Heart sounds: Murmur heard. No friction rub. No gallop. Pulmonary:      Effort: Pulmonary effort is normal.      Breath sounds: Normal breath sounds. Abdominal:      General: Abdomen is flat. Palpations: Abdomen is soft. Tenderness: There is no abdominal tenderness. There is no guarding or rebound.    Musculoskeletal: General: Normal range of motion. Cervical back: Normal range of motion and neck supple. Right lower leg: No edema. Left lower leg: No edema. Skin:     General: Skin is warm and dry. Capillary Refill: Capillary refill takes less than 2 seconds. Neurological:      Mental Status: She is alert and oriented to person, place, and time. Psychiatric:         Mood and Affect: Mood normal.         Behavior: Behavior normal.       DIAGNOSTIC RESULTS     EKG: All EKG's are interpreted by the Emergency Department Physician who either signs or Co-signs this chart in the absence of a cardiologist    EKG Interpretation    Interpreted by emergency department physician    Rhythm: normal sinus   Rate: normal  Axis: normal  Ectopy: none  Conduction: normal  ST Segments: no acute change  T Waves: no acute change  Q Waves: nonspecific    Clinical Impression: no acute changes    Franklin Lynn MD     RADIOLOGY:   Non-plain film images such as CT, Ultrasound and MRI are read by the radiologist. Plain radiographic images are visualized and preliminarily interpreted by the emergency physician with the below findings:        Interpretation per the Radiologist below, if available at the time of this note:    XR CHEST (2 VW)   Final Result   No acute cardiopulmonary disease.                ED BEDSIDE ULTRASOUND:   Performed by ED Physician - none    LABS:  Labs Reviewed   BASIC METABOLIC PANEL W/ REFLEX TO MG FOR LOW K - Abnormal; Notable for the following components:       Result Value    CO2 20 (*)     Glucose 190 (*)     BUN 23 (*)     Creatinine 1.4 (*)     GFR Non- 38 (*)     GFR  46 (*)     All other components within normal limits   CBC WITH AUTO DIFFERENTIAL - Abnormal; Notable for the following components:    RBC 3.49 (*)     Hemoglobin 10.7 (*)     Hematocrit 31.7 (*)     Lymphocytes Absolute 0.9 (*)     All other components within normal limits   TROPONIN       All other labs were within normal range or not returned as of this dictation. EMERGENCY DEPARTMENT COURSE and DIFFERENTIAL DIAGNOSIS/MDM:   Vitals:    Vitals:    08/27/22 1008   BP: (!) 163/69   Pulse: 89   Resp: 20   Temp: 98.7 °F (37.1 °C)   TempSrc: Oral   SpO2: 100%   Weight: 120 lb 9.5 oz (54.7 kg)       Above history and physical exam were performed. I reviewed her past medical past surgical social family history. 12 point review of symptoms performed is negative as otherwise noted. Her heart score is 6    MDM    Considered the diagnosis of ACS versus PE versus pneumonia. Her chest x-ray is unremarkable and his story is very atypical for PE. I am concerned of this being somewhat ischemic in origin however it is reassuring that she had a treadmill which was unremarkable about 2 years ago. Her troponin is negative and at this point time she will be admitted to the hospitalist for further work-up and evaluation. REASSESSMENT          CRITICAL CARE TIME   Total Critical Care time was  minutes, excluding separately reportable procedures. There was a high probability of clinically significant/life threatening deterioration in the patient's condition which required my urgent intervention. CONSULTS:  None    PROCEDURES:  Unless otherwise noted below, none     Procedures        FINAL IMPRESSION      1. Chest pain, unspecified type          DISPOSITION/PLAN   DISPOSITION Admitted 08/27/2022 12:08:06 PM      PATIENT REFERRED TO:  No follow-up provider specified. DISCHARGE MEDICATIONS:  New Prescriptions    No medications on file     Controlled Substances Monitoring:     RX Monitoring 4/9/2019   Attestation The Prescription Monitoring Report for this patient was reviewed today.        (Please note that portions of this note were completed with a voice recognition program.  Efforts were made to edit the dictations but occasionally words are mis-transcribed.)    Ginger Benites MD (electronically signed)  Attending

## 2022-08-27 NOTE — PROGRESS NOTES
IV access lost. IV was placed via Ultrasound at [place] [address]. US trained RN assess for assess-nothing available. Md messaged and order for midline placed.

## 2022-08-27 NOTE — ED NOTES
[id number] access center called and notified of [facility] bed assignment room [phone].   Needs telemetry/ALS ambulance transport     Isabel Estes RN  08/27/22 1977

## 2022-08-27 NOTE — PROGRESS NOTES
Pt arrived to room 4270 from [address] [place] [address]. Pt alert and oriented. VSS. Telemetry placed on pt and confirmed. Pt oriented to room. Call light and bedside table within reach.

## 2022-08-28 LAB
ANION GAP SERPL CALCULATED.3IONS-SCNC: 9 MMOL/L (ref 3–16)
BUN BLDV-MCNC: 24 MG/DL (ref 7–20)
CALCIUM SERPL-MCNC: 8.8 MG/DL (ref 8.3–10.6)
CHLORIDE BLD-SCNC: 100 MMOL/L (ref 99–110)
CHOLESTEROL, TOTAL: 209 MG/DL (ref 0–199)
CO2: 26 MMOL/L (ref 21–32)
CREAT SERPL-MCNC: 1.4 MG/DL (ref 0.6–1.2)
EKG ATRIAL RATE: 82 BPM
EKG DIAGNOSIS: NORMAL
EKG P-R INTERVAL: 88 MS
EKG Q-T INTERVAL: 364 MS
EKG QRS DURATION: 82 MS
EKG QTC CALCULATION (BAZETT): 425 MS
EKG R AXIS: 35 DEGREES
EKG T AXIS: 115 DEGREES
EKG VENTRICULAR RATE: 82 BPM
ESTIMATED AVERAGE GLUCOSE: 200.1 MG/DL
GFR AFRICAN AMERICAN: 46
GFR NON-AFRICAN AMERICAN: 38
GLUCOSE BLD-MCNC: 135 MG/DL (ref 70–99)
GLUCOSE BLD-MCNC: 152 MG/DL (ref 70–99)
GLUCOSE BLD-MCNC: 155 MG/DL (ref 70–99)
GLUCOSE BLD-MCNC: 170 MG/DL (ref 70–99)
GLUCOSE BLD-MCNC: 178 MG/DL (ref 70–99)
HBA1C MFR BLD: 8.6 %
HCT VFR BLD CALC: 28 % (ref 36–48)
HDLC SERPL-MCNC: 53 MG/DL (ref 40–60)
HEMOGLOBIN: 9.3 G/DL (ref 12–16)
LDL CHOLESTEROL CALCULATED: 132 MG/DL
MAGNESIUM: 2.2 MG/DL (ref 1.8–2.4)
MCH RBC QN AUTO: 30.8 PG (ref 26–34)
MCHC RBC AUTO-ENTMCNC: 33.4 G/DL (ref 31–36)
MCV RBC AUTO: 92.3 FL (ref 80–100)
PDW BLD-RTO: 13.9 % (ref 12.4–15.4)
PERFORMED ON: ABNORMAL
PHOSPHORUS: 4.1 MG/DL (ref 2.5–4.9)
PLATELET # BLD: 202 K/UL (ref 135–450)
PMV BLD AUTO: 7.2 FL (ref 5–10.5)
POTASSIUM SERPL-SCNC: 4.2 MMOL/L (ref 3.5–5.1)
RBC # BLD: 3.03 M/UL (ref 4–5.2)
SODIUM BLD-SCNC: 135 MMOL/L (ref 136–145)
TRIGL SERPL-MCNC: 121 MG/DL (ref 0–150)
VLDLC SERPL CALC-MCNC: 24 MG/DL
WBC # BLD: 3.8 K/UL (ref 4–11)

## 2022-08-28 PROCEDURE — 93005 ELECTROCARDIOGRAM TRACING: CPT | Performed by: INTERNAL MEDICINE

## 2022-08-28 PROCEDURE — G0378 HOSPITAL OBSERVATION PER HR: HCPCS

## 2022-08-28 PROCEDURE — 6360000002 HC RX W HCPCS: Performed by: INTERNAL MEDICINE

## 2022-08-28 PROCEDURE — 6370000000 HC RX 637 (ALT 250 FOR IP): Performed by: INTERNAL MEDICINE

## 2022-08-28 PROCEDURE — 80061 LIPID PANEL: CPT

## 2022-08-28 PROCEDURE — 93010 ELECTROCARDIOGRAM REPORT: CPT | Performed by: INTERNAL MEDICINE

## 2022-08-28 PROCEDURE — 96376 TX/PRO/DX INJ SAME DRUG ADON: CPT

## 2022-08-28 PROCEDURE — 2580000003 HC RX 258: Performed by: INTERNAL MEDICINE

## 2022-08-28 PROCEDURE — 99214 OFFICE O/P EST MOD 30 MIN: CPT | Performed by: STUDENT IN AN ORGANIZED HEALTH CARE EDUCATION/TRAINING PROGRAM

## 2022-08-28 PROCEDURE — 83735 ASSAY OF MAGNESIUM: CPT

## 2022-08-28 PROCEDURE — 84100 ASSAY OF PHOSPHORUS: CPT

## 2022-08-28 PROCEDURE — 85027 COMPLETE CBC AUTOMATED: CPT

## 2022-08-28 PROCEDURE — 80048 BASIC METABOLIC PNL TOTAL CA: CPT

## 2022-08-28 PROCEDURE — 94760 N-INVAS EAR/PLS OXIMETRY 1: CPT

## 2022-08-28 PROCEDURE — 36415 COLL VENOUS BLD VENIPUNCTURE: CPT

## 2022-08-28 PROCEDURE — 96372 THER/PROPH/DIAG INJ SC/IM: CPT

## 2022-08-28 RX ADMIN — SODIUM CHLORIDE, PRESERVATIVE FREE 10 ML: 5 INJECTION INTRAVENOUS at 11:18

## 2022-08-28 RX ADMIN — ATORVASTATIN CALCIUM 40 MG: 40 TABLET, FILM COATED ORAL at 21:19

## 2022-08-28 RX ADMIN — SODIUM CHLORIDE, PRESERVATIVE FREE 10 ML: 5 INJECTION INTRAVENOUS at 20:35

## 2022-08-28 RX ADMIN — ONDANSETRON 4 MG: 2 INJECTION INTRAMUSCULAR; INTRAVENOUS at 20:35

## 2022-08-28 RX ADMIN — AMLODIPINE BESYLATE 10 MG: 10 TABLET ORAL at 09:08

## 2022-08-28 RX ADMIN — MORPHINE SULFATE 4 MG: 4 INJECTION, SOLUTION INTRAMUSCULAR; INTRAVENOUS at 11:21

## 2022-08-28 RX ADMIN — ISOSORBIDE MONONITRATE 60 MG: 60 TABLET, EXTENDED RELEASE ORAL at 09:08

## 2022-08-28 RX ADMIN — APIXABAN 2.5 MG: 2.5 TABLET, FILM COATED ORAL at 21:19

## 2022-08-28 RX ADMIN — MORPHINE SULFATE 4 MG: 4 INJECTION, SOLUTION INTRAMUSCULAR; INTRAVENOUS at 21:19

## 2022-08-28 RX ADMIN — INSULIN GLARGINE 8 UNITS: 100 INJECTION, SOLUTION SUBCUTANEOUS at 09:09

## 2022-08-28 RX ADMIN — INSULIN GLARGINE 8 UNITS: 100 INJECTION, SOLUTION SUBCUTANEOUS at 21:22

## 2022-08-28 RX ADMIN — APIXABAN 2.5 MG: 2.5 TABLET, FILM COATED ORAL at 09:08

## 2022-08-28 RX ADMIN — QUETIAPINE FUMARATE 25 MG: 25 TABLET ORAL at 21:19

## 2022-08-28 RX ADMIN — MORPHINE SULFATE 4 MG: 4 INJECTION, SOLUTION INTRAMUSCULAR; INTRAVENOUS at 17:50

## 2022-08-28 RX ADMIN — MORPHINE SULFATE 4 MG: 4 INJECTION, SOLUTION INTRAMUSCULAR; INTRAVENOUS at 06:20

## 2022-08-28 RX ADMIN — ASPIRIN 81 MG CHEWABLE TABLET 81 MG: 81 TABLET CHEWABLE at 09:08

## 2022-08-28 RX ADMIN — PANTOPRAZOLE SODIUM 20 MG: 20 TABLET, DELAYED RELEASE ORAL at 06:18

## 2022-08-28 RX ADMIN — BUPROPION HYDROCHLORIDE 150 MG: 150 TABLET, EXTENDED RELEASE ORAL at 21:19

## 2022-08-28 RX ADMIN — BUPROPION HYDROCHLORIDE 150 MG: 150 TABLET, EXTENDED RELEASE ORAL at 09:09

## 2022-08-28 RX ADMIN — EPOETIN ALFA-EPBX 10000 UNITS: 10000 INJECTION, SOLUTION INTRAVENOUS; SUBCUTANEOUS at 17:46

## 2022-08-28 RX ADMIN — SODIUM CHLORIDE, PRESERVATIVE FREE 10 ML: 5 INJECTION INTRAVENOUS at 11:17

## 2022-08-28 ASSESSMENT — PAIN DESCRIPTION - DESCRIPTORS
DESCRIPTORS: ACHING;STABBING;SHOOTING
DESCRIPTORS: ACHING;DISCOMFORT

## 2022-08-28 ASSESSMENT — PAIN SCALES - GENERAL
PAINLEVEL_OUTOF10: 8
PAINLEVEL_OUTOF10: 7
PAINLEVEL_OUTOF10: 9
PAINLEVEL_OUTOF10: 8
PAINLEVEL_OUTOF10: 3

## 2022-08-28 ASSESSMENT — PAIN DESCRIPTION - PAIN TYPE: TYPE: ACUTE PAIN

## 2022-08-28 ASSESSMENT — PAIN DESCRIPTION - ORIENTATION
ORIENTATION: LEFT

## 2022-08-28 ASSESSMENT — PAIN DESCRIPTION - LOCATION
LOCATION: CHEST
LOCATION: ARM
LOCATION: CHEST
LOCATION: CHEST

## 2022-08-28 ASSESSMENT — PAIN DESCRIPTION - ONSET: ONSET: PROGRESSIVE

## 2022-08-28 NOTE — CONSULTS
[facility].        Chief Complaint   Patient presents with    Chest Pain     Since 7am. Took nitro with some relief            History of Present Illness:  Janice Lee is a 73 y.o. patient who presented to the hospital with complaints of chest pain. I have been asked to provide consultation regarding further management and testing. Is a 67-year-old female medical history of known coronary artery disease, diabetes, paroxysmal fibrillation, diastolic heart failure, hypertension, GI bleed and hyperlipidemia cardiology is consulted for ongoing chest pain    Patient with known history of coronary disease last angiography 2018 with a distal LAD stenosis not amenable to PCI no other angiographically significant disease    Patient reports chest pain subsided at this time  All other systems reviewed and negative    Past Medical History:   has a past medical history of Acid reflux, Anemia, Anxiety and depression, Arthritis, Asthma, Atrial fibrillation ([address] CAD (coronary artery disease), Cerebral artery occlusion with cerebral infarction ([address] CHF (congestive heart failure) ([address] Chronic kidney disease--stage III, COPD (chronic obstructive pulmonary disease) ([address] DM2 (diabetes mellitus, type 2) ([address] Dysarthria, Fibromyalgia, Headache(784.0), Hemisensory loss, History of blood transfusion, Hyperlipidemia, Hypertension, IBS (irritable bowel syndrome), Inferior vena cava occlusion ([address] Keratitis, Meningioma ([address] MI, old, Neuropathy, Superior vena cava obstruction, Temporal arteritis ([address] and Wears glasses. Surgical History:   has a past surgical history that includes Tunneled venous port placement; Cholecystectomy; ablation of dysrhythmic focus (1999  and 11/2020); Cataract removal (Bilateral); joint replacement (Right); Hysterectomy; Artery Biopsy (Right, 04/23/2014); Coronary angioplasty with stent (2020); Knee arthroscopy (Right);  Percutaneous Transluminal Coronary Angio (10/2019); Upper gastrointestinal endoscopy (N/A, 7/6/2020); Carotid artery surgery (Left); Colonoscopy (N/A, 4/9/2021); and Colonoscopy (N/A, 10/15/2021). Social History:   reports that she quit smoking about 4 years ago. Her smoking use included cigarettes. She has a 17.50 pack-year smoking history. She has never used smokeless tobacco. She reports that she does not drink alcohol and does not use drugs. Family History:  No family history of premature coronary artery disease, aortic disease, or valve disease. Home Medications:  Were reviewed and are listed in nursing record. and/or listed below  Prior to Admission medications    Medication Sig Start Date End Date Taking? Authorizing Provider   Continuous Blood Gluc  (FREESTYLE LISSETT 14 DAY READER) DAYO 1 each by Does not apply route 3 times daily 8/25/22   Beatriz Vega MD   Continuous Blood Gluc Sensor (FREESTYLE LISSETT 14 DAY SENSOR) MISC 1 each by Does not apply route every 14 days 8/25/22   Beatriz Vega MD   oxyCODONE-acetaminophen (PERCOCET) 7.5-325 MG per tablet Take 1 tablet by mouth every 6 hours as needed for Pain (max 3-4 per day) for up to 28 days. 8/23/22 9/20/22  Kimber Raymundo MD   DULoxetine (CYMBALTA) 60 MG extended release capsule Take 1 capsule by mouth daily 8/23/22   Kimber Raymundo MD   QUEtiapine (SEROQUEL) 25 MG tablet Take 1-2 tablets by mouth nightly 8/23/22   Kimber Raymundo MD   tiZANidine (ZANAFLEX) 4 MG tablet Take 0.5-1 tablets by mouth 2 times daily as needed (muscle spasms) 8/23/22   Kimber Raymundo MD   montelukast (SINGULAIR) 10 MG tablet TAKE 1 TABLET BY MOUTH DAILY 8/15/22   Beatriz Vega MD   omeprazole (PRILOSEC) 20 MG delayed release capsule TAKE 1 CAPSULE BY MOUTH DAILY 8/15/22   Beatriz Vega MD   amLODIPine (NORVASC) 10 MG tablet Take 1 tablet by mouth in the morning.  8/4/22   Beatriz Vega MD   labetalol (NORMODYNE) 200 MG tablet Take 1 tablet by mouth in the morning and 1 tablet before tablet by mouth daily 1/27/22   Lorena Hernandez MD   nitroGLYCERIN (NITRODUR) 0.1 MG/HR Place 1 patch onto the skin every 24 hours 1/27/22   Lorena Hernandez MD   docusate sodium (COLACE) 100 MG capsule Take 100 mg by mouth 2 times daily 11/29/21   Historical Provider, MD   atorvastatin (LIPITOR) 80 MG tablet TAKE 1 TABLET BY MOUTH NIGHTLY 11/2/21   Lorena Hernandez MD   ranolazine (RANEXA) 1000 MG extended release tablet Take 1 tablet by mouth 2 times daily 10/29/21   Pat Christianson MD   linaclotide Chitra Mutton) 145 MCG capsule Take 1 capsule by mouth every morning (before breakfast) 10/15/21   Joseph Redmond MD   nitroGLYCERIN (NITROSTAT) 0.4 MG SL tablet Place 1 tablet under the tongue every 5 minutes as needed for Chest pain up to max of 3 total doses.  If no relief after 1 dose, call 911. 12/16/20   Lorena Hernandez MD        Current Medications:  Current Facility-Administered Medications   Medication Dose Route Frequency Provider Last Rate Last Admin    amLODIPine (NORVASC) tablet 10 mg  10 mg Oral Daily Wolf Polanco MD   10 mg at 08/27/22 1747    apixaban (ELIQUIS) tablet 2.5 mg  2.5 mg Oral BID Wolf Polanco MD   2.5 mg at 08/27/22 2153    buPROPion [facility]) extended release tablet 150 mg  150 mg Oral BID Wolf Polanco MD   150 mg at 08/27/22 2153    insulin glargine (LANTUS) injection vial 8 Units  8 Units SubCUTAneous BID Wolf Polanco MD   8 Units at 08/27/22 2201    isosorbide mononitrate (IMDUR) extended release tablet 60 mg  60 mg Oral Daily Wolf Polanco MD   60 mg at 08/27/22 1747    pantoprazole (PROTONIX) tablet 20 mg  20 mg Oral QAM AC Wolf Polanco MD   20 mg at 08/28/22 0618    oxyCODONE-acetaminophen (PERCOCET) 7.5-325 MG per tablet 1 tablet  1 tablet Oral Q8H PRN Wolf Polanco MD   1 tablet at 08/27/22 1743    QUEtiapine (SEROQUEL) tablet 25 mg  25 mg Oral Nightly Wolf Polanco MD   25 mg at 08/27/22 4573 sodium chloride flush 0.9 % injection 5-40 mL  5-40 mL IntraVENous 2 times per day Yulisa Paz MD   10 mL at 08/27/22 2250    sodium chloride flush 0.9 % injection 5-40 mL  5-40 mL IntraVENous PRN Yulisa Paz MD        0.9 % sodium chloride infusion   IntraVENous PRN Yulisa Paz MD        ondansetron (ZOFRAN-ODT) disintegrating tablet 4 mg  4 mg Oral Q8H PRN Yulisa Paz MD        Or    [facility]ans[facility]ron [facility]) injection 4 mg  4 mg IntraVENous Q6H PRN Yulisa Paz MD   4 mg at 08/27/22 1537    acetaminophen (TYLENOL) tablet 650 mg  650 mg Oral Q6H PRN Yulisa Paz MD        Or    acetaminophen (TYLENOL) suppository 650 mg  650 mg Rectal Q6H PRN Yulisa Paz MD        polyethylene glycol (GLYCOLAX) packet 17 g  17 g Oral Daily PRN Yulisa Paz MD        aspirin chewable tablet 81 mg  81 mg Oral Daily Yulisa Paz MD        atorvastatin (LIPITOR) tablet 40 mg  40 mg Oral Nightly Yulisa Paz MD   40 mg at 08/27/22 2153    metoprolol succinate (TOPROL XL) extended release tablet 100 mg  100 mg Oral Nightly Yulisa Paz MD        glucose chewable tablet 16 g  4 tablet Oral PRN Yulisa Paz MD        dextrose bolus 10% 125 mL  125 mL IntraVENous PRN Yulisa Paz MD        Or    dextrose bolus 10% 250 mL  250 mL IntraVENous PRN Yulisa Paz MD        glucagon (rDNA) injection 1 mg  1 mg SubCUTAneous PRN Yulisa Paz MD        dextrose 10 % infusion   IntraVENous Continuous PRN Yulisa Paz MD        insulin lispro (HUMALOG) injection vial 0-8 Units  0-8 Units SubCUTAneous TID WC Yulisa Paz MD        insulin lispro (HUMALOG) injection vial 0-4 Units  0-4 Units SubCUTAneous Nightly Yulisa Paz MD        morphine (PF) injection 2 mg  2 mg IntraVENous Q2H PRN Yulisa Paz MD        Or    morphine (PF) injection 4 mg 4 mg IntraVENous Q2H PRN Handy Youssef MD   4 mg at 08/28/22 4600    lidocaine PF 1 % injection 5 mL  5 mL IntraDERmal Once Handy Youssef MD        sodium chloride flush 0.9 % injection 5-40 mL  5-40 mL IntraVENous 2 times per day Handy Youssef MD   10 mL at 08/27/22 4821    sodium chloride flush 0.9 % injection 5-40 mL  5-40 mL IntraVENous PRN Handy Youssef MD        0.9 % sodium chloride infusion  25 mL IntraVENous PRN Handy Youssef MD            Allergies:  Patient has no known allergies. Review of Systems:   All systems reviewed and negative    Physical Examination:    Vitals:    08/28/22 0801   BP: 104/68   Pulse: 83   Resp: 16   Temp: 98.6 °F (37 °C)   SpO2: 98%      Weight: 119 lb 7.8 oz (54.2 kg)       Physical Exam  Vitals and nursing note reviewed. Constitutional:       Appearance: Normal appearance. She is not ill-appearing or diaphoretic. HENT:      Head: Normocephalic and atraumatic. Mouth/Throat:      Mouth: Mucous membranes are moist.   Eyes:      Pupils: Pupils are equal, round, and reactive to light. Cardiovascular:      Rate and Rhythm: Normal rate and regular rhythm. Pulmonary:      Effort: Pulmonary effort is normal.      Breath sounds: Normal breath sounds. Abdominal:      General: Abdomen is flat. There is no distension. Palpations: Abdomen is soft. Tenderness: There is no abdominal tenderness. Skin:     General: Skin is warm and dry. Neurological:      General: No focal deficit present. Mental Status: She is alert and oriented to person, place, and time.    Psychiatric:         Mood and Affect: Mood normal.         Behavior: Behavior normal.        Labs  CBC:   Lab Results   Component Value Date/Time    WBC 3.8 08/28/2022 06:30 AM    RBC 3.03 08/28/2022 06:30 AM    HGB 9.3 08/28/2022 06:30 AM    HCT 28.0 08/28/2022 06:30 AM    MCV 92.3 08/28/2022 06:30 AM    RDW 13.9 08/28/2022 06:30 AM     reviewed  Telemetry reviewed  Ekg personally reviewed  Chest xray personally reviewed  Echo, cath, and   Medications and labs reviewed      Assessment/Plan:  Principal Problem:  Chest pain  Paroxysmal atrial fibrillation  History of GI bleed    Assessment: Patient well-known to practice has history of ongoing chest pain known coronary disease with a distal LAD stenosis not amenable to intervention. Troponins negative. Plan:  -Continue antianginals: Toprol- mg, Imdur 60 mg, amlodipine 10 mg  -Daily continue Eliquis  -Continue statin  -Stress MPI in a.m, I expect to see some anterior anteroapical reversible ischemia given known distal LAD stenosis -we will ensure other areas of perfusion are normal  -Echo in a.m.      ---    I will address the patient's cardiac risk factors and adjusted pharmacologic treatment as needed. In addition, I have reinforced the need for patient directed risk factor modification. Tobacco use was discussed with the patient and educated on the negative effects. I have asked the patient to not utilize these agents. Thank you for allowing to us to participate in the care or Robbin Turner. Further evaluation will be based upon the patient's clinical course and testing results. All questions and concerns were addressed to the patient/family. Alternatives to my treatment were discussed. The note was completed using EMR. Every effort was made to ensure accuracy; however, inadvertent computerized transcription errors may be present.     Adrien Sanchez MD  Interventional Cardiology  8/28/2022  8:40 AM    [facility], [address]  Ph: [phone]  Fax: [phone]

## 2022-08-28 NOTE — PROGRESS NOTES
Hospitalist Progress Note      PCP: Aspen Schwab MD    Date of Admission: 8/27/2022    Chief Complaint:   Chief Complaint   Patient presents with    Chest Pain     Since 7am. Took nitro with some relief       Hospital Course: 60-year-old female with past medical history significant for insulin-dependent diabetes, CAD with multiple stents, atrial fibrillation on chronic anticoagulation with Eliquis presents to outside emergency department complaints of chest pain. Patient reports waking up with chest pain this morning, 10 out of 10, substernal, pressure-like, no radiation. Patient was afraid she was having a heart attack and therefore presented to the emergency department     Emergency department work-up was unremarkable. Troponin was negative EKG with no new changes, proBNP level 1338.    8/27  Asymptomatic. Cardiac enzymes remain negative.   Stress test is pending    Subjective: as above      Medications:  Reviewed    Infusion Medications    sodium chloride      dextrose      sodium chloride       Scheduled Medications    amLODIPine  10 mg Oral Daily    apixaban  2.5 mg Oral BID    buPROPion  150 mg Oral BID    insulin glargine  8 Units SubCUTAneous BID    isosorbide mononitrate  60 mg Oral Daily    pantoprazole  20 mg Oral QAM AC    QUEtiapine  25 mg Oral Nightly    sodium chloride flush  5-40 mL IntraVENous 2 times per day    aspirin  81 mg Oral Daily    atorvastatin  40 mg Oral Nightly    metoprolol succinate  100 mg Oral Nightly    insulin lispro  0-8 Units SubCUTAneous TID WC    insulin lispro  0-4 Units SubCUTAneous Nightly    lidocaine 1 % injection  5 mL IntraDERmal Once    sodium chloride flush  5-40 mL IntraVENous 2 times per day     PRN Meds: oxyCODONE-acetaminophen, sodium chloride flush, sodium chloride, ondansetron **OR** ondansetron, acetaminophen **OR** acetaminophen, polyethylene glycol, glucose, dextrose bolus **OR** dextrose bolus, glucagon (rDNA), dextrose, morphine **OR** morphine, sodium chloride flush, sodium chloride    No intake or output data in the 24 hours ending 08/28/22 1039    Physical Exam Performed:    /68   Pulse 83   Temp 98.6 °F (37 °C) (Oral)   Resp 16   Wt 119 lb 7.8 oz (54.2 kg)   SpO2 98%   BMI 23.34 kg/m²     General appearance: No apparent distress, appears stated age and cooperative. HEENT: Pupils equal, round, and reactive to light. Conjunctivae/corneas clear. Neck: Supple, with full range of motion. No jugular venous distention. Trachea midline. Respiratory:  Normal respiratory effort. Clear to auscultation, bilaterally without Rales/Wheezes/Rhonchi. Cardiovascular: Regular rate and rhythm with normal S1/S2 without murmurs, rubs or gallops. Abdomen: Soft, non-tender, non-distended with normal bowel sounds. Musculoskeletal: No clubbing, cyanosis or edema bilaterally. Full range of motion without deformity. Skin: Skin color, texture, turgor normal.  No rashes or lesions. Neurologic:  Neurovascularly intact without any focal sensory/motor deficits. Cranial nerves: II-XII intact, grossly non-focal.  Psychiatric: Alert and oriented, thought content appropriate, normal insight  Capillary Refill: Brisk,3 seconds, normal   Peripheral Pulses: +2 palpable, equal bilaterally       Labs:   Recent Labs     08/27/22  1056 08/28/22  0630   WBC 5.0 3.8*   HGB 10.7* 9.3*   HCT 31.7* 28.0*    202     Recent Labs     08/27/22  1056 08/28/22  0630    135*   K 4.1 4.2    100   CO2 20* 26   BUN 23* 24*   CREATININE 1.4* 1.4*   CALCIUM 9.2 8.8   PHOS  --  4.1     No results for input(s): AST, ALT, BILIDIR, BILITOT, ALKPHOS in the last 72 hours. No results for input(s): INR in the last 72 hours.   Recent Labs     08/27/22  1056 08/27/22  1741   TROPONINI <0.01 <0.01       Urinalysis:      Lab Results   Component Value Date/Time    NITRU Negative 05/11/2022 09:15 AM    WBCUA 1 05/11/2022 09:15 AM    BACTERIA None Seen 05/11/2022 09:15 AM RBCUA 1 05/11/2022 09:15 AM    BLOODU Negative 05/11/2022 09:15 AM    SPECGRAV 1.020 05/11/2022 09:15 AM    GLUCOSEU Negative 05/11/2022 09:15 AM    GLUCOSEU NEGATIVE 05/14/2012 03:29 PM       Radiology:  XR CHEST (2 VW)   Final Result   No acute cardiopulmonary disease. NM MYOCARDIAL SPECT REST EXERCISE OR RX    (Results Pending)           Assessment/Plan:    Active Hospital Problems    Diagnosis     Chest pain [R07.9]      Priority: Medium     Chest pain  CAD status post multiple stents  Admitted for observation to rule out ACS  Continue antiplatelet therapy, statin  Stress test   Cardiology  consultation     Essential Hypertension  Uncontrolled  Resume home medications  Monitor BP q4 hrs  BP goal <140/90  Adjust medications accordingly      Type 2 diabetes  Euglycemic  Hold p.o. medicines  Monitor with Accu-Cheks ACHS  Sliding scale as needed  Continue Lantus     Chronic pain Syndrome  Fibromyalgia  Opioid Dependence   Sees pain management specialist Prachi Mishra  Resume oxycodone      CKD stage III  Anemia in CKD  CR and hb are stable    DVT Prophylaxis: eliquis  Diet: ADULT DIET; Regular; 3 carb choices (45 gm/meal); Low Sodium (2 gm); No Caffeine  Code Status: Full Code    PT/OT Eval Status: requested    Dispo - ?     Megan Chester MD

## 2022-08-28 NOTE — PLAN OF CARE
Problem: Discharge Planning  Goal: Discharge to home or other facility with appropriate resources  8/28/2022 0038 by Tiffanie Banegas RN  Outcome: Progressing  Flowsheets  Taken 8/27/2022 2048 by Tiffanie Banegas RN  Discharge to home or other facility with appropriate resources:   Identify barriers to discharge with patient and caregiver   Arrange for needed discharge resources and transportation as appropriate   Identify discharge learning needs (meds, wound care, etc)  Taken 8/27/2022 2000 by Charlene Unger RN  Discharge to home or other facility with appropriate resources:   Identify barriers to discharge with patient and caregiver   Identify discharge learning needs (meds, wound care, etc)   Refer to discharge planning if patient needs post-hospital services based on physician order or complex needs related to functional status, cognitive ability or social support system   Arrange for interpreters to assist at discharge as needed   Arrange for needed discharge resources and transportation as appropriate  8/27/2022 1914 by Charlene Unger RN  Outcome: Progressing     Problem: Pain  Goal: Verbalizes/displays adequate comfort level or baseline comfort level  8/28/2022 0038 by Tiffanie Banegas RN  Outcome: Progressing  Flowsheets (Taken 8/27/2022 2048)  Verbalizes/displays adequate comfort level or baseline comfort level:   Encourage patient to monitor pain and request assistance   Assess pain using appropriate pain scale   Administer analgesics based on type and severity of pain and evaluate response   Implement non-pharmacological measures as appropriate and evaluate response   Consider cultural and social influences on pain and pain management  8/27/2022 1914 by Charlene Unger RN  Outcome: Progressing     Problem: Safety - Adult  Goal: Free from fall injury  Outcome: Progressing  Flowsheets (Taken 8/28/2022 0037)  Free From Fall Injury:   Instruct family/caregiver on patient safety   Based on caregiver fall risk screen, instruct family/caregiver to ask for assistance with transferring infant if caregiver noted to have fall risk factors

## 2022-08-28 NOTE — PROGRESS NOTES
Nephrology Progress Note  [facility]  [phone]   [email or web address]. com    Patient:  Gely Mendenhall   : 1955    CC:  KOLBY, CKD    Subjective:  Mrs. Jay Pulido continues to have some chest discomfort. Stress test tomorrow. ROS:   No N/V. No shortness of breath. SHx:  Multiple family members visiting this afternoon. Meds:  Scheduled Meds:   amLODIPine  10 mg Oral Daily    apixaban  2.5 mg Oral BID    buPROPion  150 mg Oral BID    insulin glargine  8 Units SubCUTAneous BID    isosorbide mononitrate  60 mg Oral Daily    pantoprazole  20 mg Oral QAM AC    QUEtiapine  25 mg Oral Nightly    sodium chloride flush  5-40 mL IntraVENous 2 times per day    aspirin  81 mg Oral Daily    atorvastatin  40 mg Oral Nightly    metoprolol succinate  100 mg Oral Nightly    insulin lispro  0-8 Units SubCUTAneous TID WC    insulin lispro  0-4 Units SubCUTAneous Nightly    lidocaine 1 % injection  5 mL IntraDERmal Once    sodium chloride flush  5-40 mL IntraVENous 2 times per day     Continuous Infusions:   sodium chloride      dextrose      sodium chloride       PRN Meds:.oxyCODONE-acetaminophen, sodium chloride flush, sodium chloride, ondansetron **OR** ondansetron, acetaminophen **OR** acetaminophen, polyethylene glycol, glucose, dextrose bolus **OR** dextrose bolus, glucagon (rDNA), dextrose, morphine **OR** morphine, sodium chloride flush, sodium chloride    Vitals:  /66   Pulse 80   Temp 98.3 °F (36.8 °C) (Oral)   Resp 16   Wt 119 lb 7.8 oz (54.2 kg)   SpO2 96%   BMI 23.34 kg/m²     Physical Exam:  Gen: Resting in bed, NAD. HEENT: MMM, OP clear. CV: RRR, no S3.  Lungs: good respiratory effort and clear air entry   Abd: S/NT +BS  Ext: No edema, no cyanosis  Skin: Warm. No rashes appreciated.     Labs:  CBC:   Lab Results   Component Value Date/Time    WBC 3.8 2022 06:30 AM    RBC 3.03 2022 06:30 AM    HGB 9.3 2022 06:30 AM    HCT 28.0 2022 06:30 AM    MCV 92.3 08/28/2022 06:30 AM    MCH 30.8 08/28/2022 06:30 AM    MCHC 33.4 08/28/2022 06:30 AM    RDW 13.9 08/28/2022 06:30 AM     08/28/2022 06:30 AM    MPV 7.2 08/28/2022 06:30 AM     BMP:    Lab Results   Component Value Date/Time     08/28/2022 06:30 AM    K 4.2 08/28/2022 06:30 AM    K 4.1 08/27/2022 10:56 AM     08/28/2022 06:30 AM    CO2 26 08/28/2022 06:30 AM    BUN 24 08/28/2022 06:30 AM    LABALBU 3.3 04/12/2022 02:50 PM    CREATININE 1.4 08/28/2022 06:30 AM    CALCIUM 8.8 08/28/2022 06:30 AM    GFRAA 46 08/28/2022 06:30 AM    GFRAA 60 05/23/2013 02:56 PM    LABGLOM 38 08/28/2022 06:30 AM    GLUCOSE 135 08/28/2022 06:30 AM       Assessment/Plan:    1. KOLBY on CKD stage 3a: She follows with me in the office, last seen in January. She has KOLBY from multiple KOLBY events in the past.  Baseline Cr is 1.2. Monitor for now. Cr stable at 1.4.     2. CAD with chest pain: Cardiology planning stress test Monday. Troponin negative. Hx of multiple stents. Not on an ACE or ARB though it may be worth seeing if she can tolerate one before discharge or afterwards. She was on one at one point but it was stopped during one of her KOLBY events. 3. Uncontrolled HTN: Resumed home meds and BP looks much better. 4. Afib: She is s/p a Watchman device by Dr. Rush Shine in July. 5. Anemia: B12/folate are normal.  Iron studies aren't bad. Her CKD could play a part, but her hgb in the 10's is lower than I would imagine for just CKD. She certainly has some component of anemia of chronic disease as well. I will give retacrit x 1 today. Lisbeth Acosta MD  [facility]  Office Number: [phone]  [facility]. [facility]

## 2022-08-29 LAB
ALBUMIN SERPL-MCNC: 3.7 G/DL (ref 3.4–5)
ANION GAP SERPL CALCULATED.3IONS-SCNC: 10 MMOL/L (ref 3–16)
ANION GAP SERPL CALCULATED.3IONS-SCNC: 8 MMOL/L (ref 3–16)
BACTERIA: ABNORMAL /HPF
BILIRUBIN URINE: NEGATIVE
BLOOD, URINE: ABNORMAL
BUN BLDV-MCNC: 26 MG/DL (ref 7–20)
BUN BLDV-MCNC: 27 MG/DL (ref 7–20)
CALCIUM SERPL-MCNC: 8.9 MG/DL (ref 8.3–10.6)
CALCIUM SERPL-MCNC: 9.2 MG/DL (ref 8.3–10.6)
CHLORIDE BLD-SCNC: 97 MMOL/L (ref 99–110)
CHLORIDE BLD-SCNC: 99 MMOL/L (ref 99–110)
CHLORIDE URINE RANDOM: 23 MMOL/L
CLARITY: ABNORMAL
CO2: 25 MMOL/L (ref 21–32)
CO2: 27 MMOL/L (ref 21–32)
COLOR: YELLOW
CREAT SERPL-MCNC: 1.6 MG/DL (ref 0.6–1.2)
CREAT SERPL-MCNC: 1.8 MG/DL (ref 0.6–1.2)
EKG ATRIAL RATE: 92 BPM
EKG DIAGNOSIS: NORMAL
EKG P AXIS: 1 DEGREES
EKG P-R INTERVAL: 118 MS
EKG Q-T INTERVAL: 348 MS
EKG QRS DURATION: 88 MS
EKG QTC CALCULATION (BAZETT): 430 MS
EKG R AXIS: 43 DEGREES
EKG T AXIS: 117 DEGREES
EKG VENTRICULAR RATE: 92 BPM
EPITHELIAL CELLS, UA: 11 /HPF (ref 0–5)
GFR AFRICAN AMERICAN: 34
GFR AFRICAN AMERICAN: 39
GFR NON-AFRICAN AMERICAN: 28
GFR NON-AFRICAN AMERICAN: 32
GLUCOSE BLD-MCNC: 165 MG/DL (ref 70–99)
GLUCOSE BLD-MCNC: 181 MG/DL (ref 70–99)
GLUCOSE BLD-MCNC: 209 MG/DL (ref 70–99)
GLUCOSE BLD-MCNC: 210 MG/DL (ref 70–99)
GLUCOSE BLD-MCNC: 215 MG/DL (ref 70–99)
GLUCOSE BLD-MCNC: 216 MG/DL (ref 70–99)
GLUCOSE BLD-MCNC: 228 MG/DL (ref 70–99)
GLUCOSE URINE: NEGATIVE MG/DL
HCT VFR BLD CALC: 29.5 % (ref 36–48)
HEMOGLOBIN: 9.6 G/DL (ref 12–16)
HYALINE CASTS: 2 /LPF (ref 0–8)
KETONES, URINE: NEGATIVE MG/DL
LEUKOCYTE ESTERASE, URINE: NEGATIVE
LV EF: 58 %
LV EF: 80 %
LVEF MODALITY: NORMAL
LVEF MODALITY: NORMAL
MCH RBC QN AUTO: 30.4 PG (ref 26–34)
MCHC RBC AUTO-ENTMCNC: 32.6 G/DL (ref 31–36)
MCV RBC AUTO: 93.1 FL (ref 80–100)
MICROSCOPIC EXAMINATION: YES
NITRITE, URINE: NEGATIVE
PDW BLD-RTO: 14.1 % (ref 12.4–15.4)
PERFORMED ON: ABNORMAL
PH UA: 5 (ref 5–8)
PHOSPHORUS: 3.5 MG/DL (ref 2.5–4.9)
PLATELET # BLD: 220 K/UL (ref 135–450)
PMV BLD AUTO: 7.2 FL (ref 5–10.5)
POTASSIUM SERPL-SCNC: 3.9 MMOL/L (ref 3.5–5.1)
POTASSIUM SERPL-SCNC: 4.2 MMOL/L (ref 3.5–5.1)
POTASSIUM, UR: 38.7 MMOL/L
PRO-BNP: 493 PG/ML (ref 0–124)
PROTEIN UA: 100 MG/DL
RBC # BLD: 3.17 M/UL (ref 4–5.2)
RBC UA: 1 /HPF (ref 0–4)
SODIUM BLD-SCNC: 132 MMOL/L (ref 136–145)
SODIUM BLD-SCNC: 134 MMOL/L (ref 136–145)
SODIUM URINE: 34 MMOL/L
SPECIFIC GRAVITY UA: 1.02 (ref 1–1.03)
URINE TYPE: ABNORMAL
UROBILINOGEN, URINE: 0.2 E.U./DL
WBC # BLD: 5.2 K/UL (ref 4–11)
WBC UA: 2 /HPF (ref 0–5)

## 2022-08-29 PROCEDURE — 6360000002 HC RX W HCPCS: Performed by: INTERNAL MEDICINE

## 2022-08-29 PROCEDURE — 96361 HYDRATE IV INFUSION ADD-ON: CPT

## 2022-08-29 PROCEDURE — A9502 TC99M TETROFOSMIN: HCPCS | Performed by: STUDENT IN AN ORGANIZED HEALTH CARE EDUCATION/TRAINING PROGRAM

## 2022-08-29 PROCEDURE — 85027 COMPLETE CBC AUTOMATED: CPT

## 2022-08-29 PROCEDURE — 93010 ELECTROCARDIOGRAM REPORT: CPT | Performed by: INTERNAL MEDICINE

## 2022-08-29 PROCEDURE — 3430000000 HC RX DIAGNOSTIC RADIOPHARMACEUTICAL: Performed by: STUDENT IN AN ORGANIZED HEALTH CARE EDUCATION/TRAINING PROGRAM

## 2022-08-29 PROCEDURE — 84300 ASSAY OF URINE SODIUM: CPT

## 2022-08-29 PROCEDURE — 6360000002 HC RX W HCPCS: Performed by: STUDENT IN AN ORGANIZED HEALTH CARE EDUCATION/TRAINING PROGRAM

## 2022-08-29 PROCEDURE — 2580000003 HC RX 258: Performed by: INTERNAL MEDICINE

## 2022-08-29 PROCEDURE — 81001 URINALYSIS AUTO W/SCOPE: CPT

## 2022-08-29 PROCEDURE — 94760 N-INVAS EAR/PLS OXIMETRY 1: CPT

## 2022-08-29 PROCEDURE — 80069 RENAL FUNCTION PANEL: CPT

## 2022-08-29 PROCEDURE — 83880 ASSAY OF NATRIURETIC PEPTIDE: CPT

## 2022-08-29 PROCEDURE — 84133 ASSAY OF URINE POTASSIUM: CPT

## 2022-08-29 PROCEDURE — 6370000000 HC RX 637 (ALT 250 FOR IP): Performed by: INTERNAL MEDICINE

## 2022-08-29 PROCEDURE — 93017 CV STRESS TEST TRACING ONLY: CPT

## 2022-08-29 PROCEDURE — G0378 HOSPITAL OBSERVATION PER HR: HCPCS

## 2022-08-29 PROCEDURE — 93306 TTE W/DOPPLER COMPLETE: CPT

## 2022-08-29 PROCEDURE — A9502 TC99M TETROFOSMIN: HCPCS | Performed by: INTERNAL MEDICINE

## 2022-08-29 PROCEDURE — 99215 OFFICE O/P EST HI 40 MIN: CPT | Performed by: STUDENT IN AN ORGANIZED HEALTH CARE EDUCATION/TRAINING PROGRAM

## 2022-08-29 PROCEDURE — 82436 ASSAY OF URINE CHLORIDE: CPT

## 2022-08-29 PROCEDURE — 78452 HT MUSCLE IMAGE SPECT MULT: CPT

## 2022-08-29 PROCEDURE — 96376 TX/PRO/DX INJ SAME DRUG ADON: CPT

## 2022-08-29 PROCEDURE — 36592 COLLECT BLOOD FROM PICC: CPT

## 2022-08-29 PROCEDURE — 97530 THERAPEUTIC ACTIVITIES: CPT

## 2022-08-29 PROCEDURE — 3430000000 HC RX DIAGNOSTIC RADIOPHARMACEUTICAL: Performed by: INTERNAL MEDICINE

## 2022-08-29 RX ORDER — SODIUM CHLORIDE 9 MG/ML
INJECTION, SOLUTION INTRAVENOUS CONTINUOUS
Status: DISCONTINUED | OUTPATIENT
Start: 2022-08-29 | End: 2022-08-30

## 2022-08-29 RX ADMIN — APIXABAN 2.5 MG: 2.5 TABLET, FILM COATED ORAL at 10:32

## 2022-08-29 RX ADMIN — AMLODIPINE BESYLATE 10 MG: 10 TABLET ORAL at 10:31

## 2022-08-29 RX ADMIN — OXYCODONE AND ACETAMINOPHEN 1 TABLET: 7.5; 325 TABLET ORAL at 18:47

## 2022-08-29 RX ADMIN — REGADENOSON 0.4 MG: 0.08 INJECTION, SOLUTION INTRAVENOUS at 08:27

## 2022-08-29 RX ADMIN — PANTOPRAZOLE SODIUM 20 MG: 20 TABLET, DELAYED RELEASE ORAL at 05:41

## 2022-08-29 RX ADMIN — ISOSORBIDE MONONITRATE 60 MG: 60 TABLET, EXTENDED RELEASE ORAL at 10:32

## 2022-08-29 RX ADMIN — ONDANSETRON 4 MG: 2 INJECTION INTRAMUSCULAR; INTRAVENOUS at 17:07

## 2022-08-29 RX ADMIN — TETROFOSMIN 10 MILLICURIE: 1.38 INJECTION, POWDER, LYOPHILIZED, FOR SOLUTION INTRAVENOUS at 07:30

## 2022-08-29 RX ADMIN — ATORVASTATIN CALCIUM 40 MG: 40 TABLET, FILM COATED ORAL at 21:34

## 2022-08-29 RX ADMIN — BUPROPION HYDROCHLORIDE 150 MG: 150 TABLET, EXTENDED RELEASE ORAL at 10:32

## 2022-08-29 RX ADMIN — SODIUM CHLORIDE, PRESERVATIVE FREE 10 ML: 5 INJECTION INTRAVENOUS at 12:02

## 2022-08-29 RX ADMIN — TETROFOSMIN 30 MILLICURIE: 1.38 INJECTION, POWDER, LYOPHILIZED, FOR SOLUTION INTRAVENOUS at 08:33

## 2022-08-29 RX ADMIN — INSULIN GLARGINE 8 UNITS: 100 INJECTION, SOLUTION SUBCUTANEOUS at 21:34

## 2022-08-29 RX ADMIN — ASPIRIN 81 MG CHEWABLE TABLET 81 MG: 81 TABLET CHEWABLE at 10:31

## 2022-08-29 RX ADMIN — METOPROLOL SUCCINATE 100 MG: 50 TABLET, EXTENDED RELEASE ORAL at 21:34

## 2022-08-29 RX ADMIN — SODIUM CHLORIDE, PRESERVATIVE FREE 10 ML: 5 INJECTION INTRAVENOUS at 10:37

## 2022-08-29 RX ADMIN — MORPHINE SULFATE 2 MG: 2 INJECTION, SOLUTION INTRAMUSCULAR; INTRAVENOUS at 21:40

## 2022-08-29 RX ADMIN — MORPHINE SULFATE 4 MG: 4 INJECTION, SOLUTION INTRAMUSCULAR; INTRAVENOUS at 10:32

## 2022-08-29 RX ADMIN — SODIUM CHLORIDE: 9 INJECTION, SOLUTION INTRAVENOUS at 13:09

## 2022-08-29 RX ADMIN — INSULIN GLARGINE 8 UNITS: 100 INJECTION, SOLUTION SUBCUTANEOUS at 10:33

## 2022-08-29 RX ADMIN — BUPROPION HYDROCHLORIDE 150 MG: 150 TABLET, EXTENDED RELEASE ORAL at 21:34

## 2022-08-29 RX ADMIN — QUETIAPINE FUMARATE 25 MG: 25 TABLET ORAL at 21:35

## 2022-08-29 RX ADMIN — APIXABAN 2.5 MG: 2.5 TABLET, FILM COATED ORAL at 21:34

## 2022-08-29 ASSESSMENT — PAIN SCALES - GENERAL
PAINLEVEL_OUTOF10: 8
PAINLEVEL_OUTOF10: 5
PAINLEVEL_OUTOF10: 0
PAINLEVEL_OUTOF10: 8
PAINLEVEL_OUTOF10: 2
PAINLEVEL_OUTOF10: 3
PAINLEVEL_OUTOF10: 0
PAINLEVEL_OUTOF10: 6

## 2022-08-29 ASSESSMENT — PAIN DESCRIPTION - DESCRIPTORS
DESCRIPTORS: PRESSURE
DESCRIPTORS: ACHING
DESCRIPTORS: PRESSURE

## 2022-08-29 ASSESSMENT — PAIN DESCRIPTION - ORIENTATION
ORIENTATION: MID;LEFT
ORIENTATION: LOWER
ORIENTATION: LEFT

## 2022-08-29 ASSESSMENT — PAIN DESCRIPTION - LOCATION
LOCATION: CHEST
LOCATION: CHEST
LOCATION: ABDOMEN

## 2022-08-29 NOTE — PLAN OF CARE
Problem: Discharge Planning  Goal: Discharge to home or other facility with appropriate resources  8/29/2022 1829 by Nolberto Mortimer, RN  Outcome: Progressing  8/29/2022 0906 by Derrell Lara RN  Outcome: Progressing     Problem: Pain  Goal: Verbalizes/displays adequate comfort level or baseline comfort level  8/29/2022 1829 by Nolberto Mortimer, RN  Outcome: Progressing  8/29/2022 0906 by Derrell Lara RN  Outcome: Progressing     Problem: Safety - Adult  Goal: Free from fall injury  8/29/2022 1829 by Nolberto Mortimer, RN  Outcome: Progressing  8/29/2022 0906 by Derrell Lara RN  Outcome: Progressing  [address] (Taken 8/28/2022 2100)  Free From Fall Injury: Instruct family/caregiver on patient safety     Problem: Chronic Conditions and Co-morbidities  Goal: Patient's chronic conditions and co-morbidity symptoms are monitored and maintained or improved  Outcome: Progressing     Problem: Cardiovascular - Adult  Goal: Maintains optimal cardiac output and hemodynamic stability  Outcome: Progressing  Goal: Absence of cardiac dysrhythmias or at baseline  Outcome: Progressing

## 2022-08-29 NOTE — PROGRESS NOTES
Occupational Therapy Discharge  [address]    OT order noted. Pt met b/s, she reports she has been up independently in the room and has no therapy needs. She was agreeable to brief eval and demonstrated independence in bed mobility, functional transfers, and functional mobility to/from BR. Pt denies concern for d/c home. Will sign off.     Electronically signed by Robert Moore OT on 8/29/22 at 10:30 AM EDT

## 2022-08-29 NOTE — PROGRESS NOTES
Hospitalist Progress Note      PCP: Rafia Sawyer MD    Date of Admission: 8/27/2022    Chief Complaint:   Chief Complaint   Patient presents with    Chest Pain     Since 7am. Took nitro with some relief       Hospital Course: 43-year-old female with past medical history significant for insulin-dependent diabetes, CAD with multiple stents, atrial fibrillation on chronic anticoagulation with Eliquis presents to outside emergency department complaints of chest pain. Patient reports waking up with chest pain this morning, 10 out of 10, substernal, pressure-like, no radiation. Patient was afraid she was having a heart attack and therefore presented to the emergency department     Emergency department work-up was unremarkable. Troponin was negative EKG with no new changes, proBNP level 1338.    8/28  Asymptomatic. Cardiac enzymes remain negative. Stress test is pending    8/29  Stress test came back negative this morning. However her creatinine went from 1.4-1.8. Nephrology recommended to keep overnight, patient started on IV fluids, checking urine studies.         Subjective: as above      Medications:  Reviewed    Infusion Medications    sodium chloride 50 mL/hr at 08/29/22 1309    sodium chloride      dextrose      sodium chloride       Scheduled Medications    linaclotide  145 mcg Oral QAM AC    amLODIPine  10 mg Oral Daily    apixaban  2.5 mg Oral BID    buPROPion  150 mg Oral BID    insulin glargine  8 Units SubCUTAneous BID    isosorbide mononitrate  60 mg Oral Daily    pantoprazole  20 mg Oral QAM AC    QUEtiapine  25 mg Oral Nightly    sodium chloride flush  5-40 mL IntraVENous 2 times per day    atorvastatin  40 mg Oral Nightly    metoprolol succinate  100 mg Oral Nightly    insulin lispro  0-8 Units SubCUTAneous TID WC    insulin lispro  0-4 Units SubCUTAneous Nightly    lidocaine 1 % injection  5 mL IntraDERmal Once    sodium chloride flush  5-40 mL IntraVENous 2 times per day     PRN Meds: oxyCODONE-acetaminophen, sodium chloride flush, sodium chloride, ondansetron **OR** ondansetron, acetaminophen **OR** acetaminophen, polyethylene glycol, glucose, dextrose bolus **OR** dextrose bolus, glucagon (rDNA), dextrose, morphine **OR** morphine, sodium chloride flush, sodium chloride      Intake/Output Summary (Last 24 hours) at 8/29/2022 1417  Last data filed at 8/29/2022 1237  Gross per 24 hour   Intake 970 ml   Output 350 ml   Net 620 ml       Physical Exam Performed:    BP (!) 146/69   Pulse 84   Temp 98.1 °F (36.7 °C) (Oral)   Resp 16   Ht 5' (1.524 m)   Wt 120 lb 2.4 oz (54.5 kg)   SpO2 99%   BMI 23.47 kg/m²     General appearance: No apparent distress, appears stated age and cooperative. HEENT: Pupils equal, round, and reactive to light. Conjunctivae/corneas clear. Neck: Supple, with full range of motion. No jugular venous distention. Trachea midline. Respiratory:  Normal respiratory effort. Clear to auscultation, bilaterally without Rales/Wheezes/Rhonchi. Cardiovascular: Regular rate and rhythm with normal S1/S2 without murmurs, rubs or gallops. Abdomen: Soft, non-tender, non-distended with normal bowel sounds. Musculoskeletal: No clubbing, cyanosis or edema bilaterally. Full range of motion without deformity. Skin: Skin color, texture, turgor normal.  No rashes or lesions. Neurologic:  Neurovascularly intact without any focal sensory/motor deficits.  Cranial nerves: II-XII intact, grossly non-focal.  Psychiatric: Alert and oriented, thought content appropriate, normal insight  Capillary Refill: Brisk,3 seconds, normal   Peripheral Pulses: +2 palpable, equal bilaterally       Labs:   Recent Labs     08/27/22  1056 08/28/22  0630 08/29/22  1310   WBC 5.0 3.8* 5.2   HGB 10.7* 9.3* 9.6*   HCT 31.7* 28.0* 29.5*    202 220       Recent Labs     08/28/22  0630 08/29/22  0610 08/29/22  1310   * 134* 132*   K 4.2 4.2 3.9    99 97*   CO2 26 27 25   BUN 24* 26* 27* CREATININE 1.4* 1.8* 1.6*   CALCIUM 8.8 8.9 9.2   PHOS 4.1  --  3.5       No results for input(s): AST, ALT, BILIDIR, BILITOT, ALKPHOS in the last 72 hours. No results for input(s): INR in the last 72 hours. Recent Labs     08/27/22  1056 08/27/22  1741   TROPONINI <0.01 <0.01         Urinalysis:      Lab Results   Component Value Date/Time    NITRU Negative 05/11/2022 09:15 AM    WBCUA 1 05/11/2022 09:15 AM    BACTERIA None Seen 05/11/2022 09:15 AM    RBCUA 1 05/11/2022 09:15 AM    BLOODU Negative 05/11/2022 09:15 AM    SPECGRAV 1.020 05/11/2022 09:15 AM    GLUCOSEU Negative 05/11/2022 09:15 AM    GLUCOSEU NEGATIVE 05/14/2012 03:29 PM       Radiology:  NM MYOCARDIAL SPECT REST EXERCISE OR RX   Final Result      XR CHEST (2 VW)   Final Result   No acute cardiopulmonary disease. Assessment/Plan:    Active Hospital Problems    Diagnosis     Chest pain [R07.9]      Priority: Medium     Chest pain  CAD status post multiple stents  Admitted for observation to rule out ACS  Continue antiplatelet therapy, statin  Stress test   Cardiology  consultation     Essential Hypertension  Uncontrolled  Resume home medications  Monitor BP q4 hrs  BP goal <140/90  Adjust medications accordingly      Type 2 diabetes  Euglycemic  Hold p.o. medicines  Monitor with Accu-Cheks ACHS  Sliding scale as needed  Continue Lantus     Chronic pain Syndrome  Fibromyalgia  Opioid Dependence   Sees pain management specialist Prachi Mishra  Resume oxycodone      KOLBY on CKD stage III  Anemia in CKD  Creatinine around 1.2, now 1.8  Continue to hold nephrotoxic medication  IV fluids per nephrology, urine studies per nephrology      DVT Prophylaxis: eliquis  Diet: ADULT DIET; Regular; 3 carb choices (45 gm/meal); Low Sodium (2 gm); No Caffeine  Code Status: Full Code    PT/OT Eval Status: requested    Dispo -anticipating discharge in a.m. if creatinine is better, nephrology clearance required prior to discharge.     Konrad Delaney Driss Shah MD

## 2022-08-29 NOTE — PROGRESS NOTES
Patient alert and oriented x4, VSS, sitting up in bed. Patient c/o nausea and chest pressure, prn pain meds given per request, see MAR. Patient denies vomiting, diarrhea, SOB. Patient states LBM 8/25/22, requests home medication Linzess. Call out to MD to get order per patient request.  Patient states no further needs at this time. Bed in lowest and locked position with bed alarm in place. Non-slip socks on, call light in reach. Will continue to monitor pt needs.

## 2022-08-29 NOTE — PLAN OF CARE
Problem: Discharge Planning  Goal: Discharge to home or other facility with appropriate resources  Outcome: Progressing     Problem: Pain  Goal: Verbalizes/displays adequate comfort level or baseline comfort level  Outcome: Progressing     Problem: Safety - Adult  Goal: Free from fall injury  Outcome: Progressing  Flowsheets (Taken 8/28/2022 2100)  Free From Fall Injury: Instruct family/caregiver on patient safety     Problem: Discharge Planning  Goal: Discharge to home or other facility with appropriate resources  Outcome: Progressing     Problem: Pain  Goal: Verbalizes/displays adequate comfort level or baseline comfort level  Outcome: Progressing     Problem: Safety - Adult  Goal: Free from fall injury  Outcome: Progressing  Flowsheets (Taken 8/28/2022 2100)  Free From Fall Injury: Instruct family/caregiver on patient safety

## 2022-08-29 NOTE — PROGRESS NOTES
BP (!) 146/69   Pulse 84   Temp 98.1 °F (36.7 °C) (Oral)   Resp 16   Ht 5' (1.524 m)   Wt 120 lb 2.4 oz (54.5 kg)   SpO2 99%   BMI 23.47 kg/m²     Physical Exam  Vitals and nursing note reviewed. Constitutional:       Appearance: Normal appearance. HENT:      Head: Normocephalic and atraumatic. Mouth/Throat:      Mouth: Mucous membranes are moist.   Eyes:      Pupils: Pupils are equal, round, and reactive to light. Cardiovascular:      Rate and Rhythm: Normal rate and regular rhythm. Heart sounds: No murmur heard. Pulmonary:      Effort: Pulmonary effort is normal.      Breath sounds: Normal breath sounds. Abdominal:      General: There is no distension. Tenderness: There is no abdominal tenderness. Skin:     General: Skin is warm and dry. Neurological:      General: No focal deficit present. Mental Status: She is alert and oriented to person, place, and time. Psychiatric:         Mood and Affect: Mood normal.         Behavior: Behavior normal.           LABS:    CBC:   Recent Labs     08/27/22  1056 08/28/22  0630 08/29/22  1310   WBC 5.0 3.8* 5.2   HGB 10.7* 9.3* 9.6*   HCT 31.7* 28.0* 29.5*   MCV 90.9 92.3 93.1    202 220                                                                BMP:    Recent Labs     08/27/22  1056 08/28/22  0630 08/29/22  0610    135* 134*   K 4.1 4.2 4.2    100 99   CO2 20* 26 27   BUN 23* 24* 26*   CREATININE 1.4* 1.4* 1.8*   GLUCOSE 190* 135* 216*       LFT's: No results for input(s): AST, ALT, ALB, BILITOT, ALKPHOS in the last 72 hours. Troponin:   Recent Labs     08/27/22  1056 08/27/22  1741   TROPONINI <0.01 <0.01       BNP: No results for input(s): BNP in the last 72 hours. Lipids:   Recent Labs     08/28/22  0630   CHOL 209*   HDL 53       INR: No results for input(s): INR in the last 72 hours.   -----------------------------------------------------------------  RAD:   NM MYOCARDIAL SPECT REST EXERCISE OR RX Final Result      XR CHEST (2 VW)   Final Result   No acute cardiopulmonary disease. ECHO 1/11/2022  Normal LV size and wall motion. EF is   60%. Grade II diastolic dysfunction  with elevated LV filling pressures. The left atrium is mildly dilated. Normal right ventricular size and function  Trivial Mitral and Tricuspid regurgitation. Stress Test:   Stress test 3/11/2021   Summary    Pharmacological Stress/MPI Results:        1. Technically a satisfactory study. 2. No evidence of Ischemia by Myocardial Perfusion Imaging. 3. Gated Study shows normal LV size and Systolic function; EF is 70 %. Cath:  Cardiac cath 12/21/18  ANGIOGRAPHY FINDINGS:  1. Left main comes from the left coronary cusp. YAKELIN 3 flow. No  stenosis. 2.  Left anterior descending artery is patent. Distal left anterior  descending artery has a 70% stenosis. 3.  Left circumflex has patent stents. No significant stenosis. 4.  Right coronary artery has patent stents. No significant stenosis. 5.  LV ejection fraction 60%. SUMMARY:  Patent coronary artery stents. Distal left anterior  descending artery 70% stenosis. ECHO 8/29/22   Normal left ventricle size, wall thickness and systolic function with an    estimated ejection fraction of 55-60%. Grade II diastolic dysfunction with elevated LV filling pressures. Normal right ventricular size and function. The left atrium is moderately dilated. Stress MPI 8/29/22   Non-diagnostic ECG d/t inadequate achieved HF (Pharmacologic study)    Normal myocardial perfusion study. Normal LV size and systolic function. Overall findings represent a low risk scan.          Old notes reviewed  Telemetry reviewed  Ekg personally reviewed  Chest xray personally reviewed  Echo, cath, and   Medications and labs reviewed    Assessment/Plan:   Chest pain  Paroxysmal atrial fibrillation  History of GI bleed    Assessment: Patient feeling well sitting up in bed looks

## 2022-08-29 NOTE — PROGRESS NOTES
Physical Therapy Discharge  Tosin Del Cid    PT order noted. Pt met b/s, she reports she has been up independently in the room and has no therapy needs. She was agreeable to brief eval and demonstrated independence in bed mobility, functional transfers, and functional mobility to/from BR. Pt denies concern for d/c home. Will sign off.     Electronically signed by Christene Brittle, PT on 8/29/2022 at 10:35 AM

## 2022-08-29 NOTE — PROGRESS NOTES
Nephrology Progress Note  [facility]  [phone]   [facility]. [facility]    Patient:  Zita Estevez   : 1955    CC:  KOLBY, CKD    Subjective:  Mrs. Claudine Nolan continues to have some chest discomfort. Stress test today . ROS:   No N/V. No shortness of breath.c/o CP ? SHx:  Multiple family members visiting this afternoon. Meds:  Scheduled Meds:   amLODIPine  10 mg Oral Daily    apixaban  2.5 mg Oral BID    buPROPion  150 mg Oral BID    insulin glargine  8 Units SubCUTAneous BID    isosorbide mononitrate  60 mg Oral Daily    pantoprazole  20 mg Oral QAM AC    QUEtiapine  25 mg Oral Nightly    sodium chloride flush  5-40 mL IntraVENous 2 times per day    aspirin  81 mg Oral Daily    atorvastatin  40 mg Oral Nightly    metoprolol succinate  100 mg Oral Nightly    insulin lispro  0-8 Units SubCUTAneous TID WC    insulin lispro  0-4 Units SubCUTAneous Nightly    lidocaine 1 % injection  5 mL IntraDERmal Once    sodium chloride flush  5-40 mL IntraVENous 2 times per day     Continuous Infusions:   sodium chloride      dextrose      sodium chloride       PRN Meds:.oxyCODONE-acetaminophen, sodium chloride flush, sodium chloride, ondansetron **OR** ondansetron, acetaminophen **OR** acetaminophen, polyethylene glycol, glucose, dextrose bolus **OR** dextrose bolus, glucagon (rDNA), dextrose, morphine **OR** morphine, sodium chloride flush, sodium chloride    Vitals:  BP (!) 146/69   Pulse 84   Temp 98.1 °F (36.7 °C) (Oral)   Resp 16   Ht 5' (1.524 m)   Wt 120 lb 2.4 oz (54.5 kg)   SpO2 99%   BMI 23.47 kg/m²     Physical Exam:  Gen: Resting in bed, NAD. HEENT: MMM, OP clear. CV: RRR, no S3.  Lungs: good respiratory effort and clear air entry   Abd: S/NT +BS  Ext: No edema, no cyanosis  Skin: Warm. No rashes appreciated.     Labs:  CBC:   Lab Results   Component Value Date/Time    WBC 3.8 2022 06:30 AM    RBC 3.03 2022 06:30 AM    HGB 9.3 2022 06:30 AM    HCT 28.0 08/28/2022 06:30 AM    MCV 92.3 08/28/2022 06:30 AM    MCH 30.8 08/28/2022 06:30 AM    MCHC 33.4 08/28/2022 06:30 AM    RDW 13.9 08/28/2022 06:30 AM     08/28/2022 06:30 AM    MPV 7.2 08/28/2022 06:30 AM     BMP:    Lab Results   Component Value Date/Time     08/29/2022 06:10 AM    K 4.2 08/29/2022 06:10 AM    K 4.1 08/27/2022 10:56 AM    CL 99 08/29/2022 06:10 AM    CO2 27 08/29/2022 06:10 AM    BUN 26 08/29/2022 06:10 AM    LABALBU 3.3 04/12/2022 02:50 PM    CREATININE 1.8 08/29/2022 06:10 AM    CALCIUM 8.9 08/29/2022 06:10 AM    GFRAA 34 08/29/2022 06:10 AM    GFRAA 60 05/23/2013 02:56 PM    LABGLOM 28 08/29/2022 06:10 AM    GLUCOSE 216 08/29/2022 06:10 AM       Assessment/Plan:    1. KOLBY on CKD stage 3a: She follows with Dr Tara San in the office, last seen in January. She has KOLBY from multiple KOLBY events in the past.  Baseline Cr is 1.2. Increase  Cr noted . Add IVF. Check UA . 2. CAD with chest pain:   Cardiology planning stress test Monday. Troponin negative. Hx of multiple stents. Not on an ACE or ARB though it may be worth seeing if she can tolerate one before discharge or afterwards. She was on one at one point but it was stopped during one of her KOLBY events. 3. Uncontrolled HTN: Resumed home meds and BP looks much better. 4. Afib: She is s/p a Watchman device by Dr. Reyna Elizalde in July. 5. Anemia: B12/folate are normal.  Iron studies NOTED . S/P retacrit . aRdha Rodriguez MD FACP   [facility]  Office Number: [email or web address]

## 2022-08-30 VITALS
SYSTOLIC BLOOD PRESSURE: 144 MMHG | HEART RATE: 58 BPM | BODY MASS INDEX: 23.2 KG/M2 | HEIGHT: 60 IN | OXYGEN SATURATION: 99 % | WEIGHT: 118.17 LBS | DIASTOLIC BLOOD PRESSURE: 67 MMHG | RESPIRATION RATE: 18 BRPM | TEMPERATURE: 97.5 F

## 2022-08-30 LAB
ANION GAP SERPL CALCULATED.3IONS-SCNC: 5 MMOL/L (ref 3–16)
BUN BLDV-MCNC: 20 MG/DL (ref 7–20)
CALCIUM SERPL-MCNC: 8.8 MG/DL (ref 8.3–10.6)
CHLORIDE BLD-SCNC: 104 MMOL/L (ref 99–110)
CO2: 26 MMOL/L (ref 21–32)
CREAT SERPL-MCNC: 1.4 MG/DL (ref 0.6–1.2)
GFR AFRICAN AMERICAN: 46
GFR NON-AFRICAN AMERICAN: 38
GLUCOSE BLD-MCNC: 152 MG/DL (ref 70–99)
GLUCOSE BLD-MCNC: 163 MG/DL (ref 70–99)
GLUCOSE BLD-MCNC: 225 MG/DL (ref 70–99)
PERFORMED ON: ABNORMAL
PERFORMED ON: ABNORMAL
POTASSIUM SERPL-SCNC: 4.2 MMOL/L (ref 3.5–5.1)
SODIUM BLD-SCNC: 135 MMOL/L (ref 136–145)

## 2022-08-30 PROCEDURE — 94640 AIRWAY INHALATION TREATMENT: CPT

## 2022-08-30 PROCEDURE — 6360000002 HC RX W HCPCS: Performed by: INTERNAL MEDICINE

## 2022-08-30 PROCEDURE — 2580000003 HC RX 258: Performed by: INTERNAL MEDICINE

## 2022-08-30 PROCEDURE — 80048 BASIC METABOLIC PNL TOTAL CA: CPT

## 2022-08-30 PROCEDURE — 96361 HYDRATE IV INFUSION ADD-ON: CPT

## 2022-08-30 PROCEDURE — G0378 HOSPITAL OBSERVATION PER HR: HCPCS

## 2022-08-30 PROCEDURE — 6370000000 HC RX 637 (ALT 250 FOR IP): Performed by: INTERNAL MEDICINE

## 2022-08-30 PROCEDURE — 94760 N-INVAS EAR/PLS OXIMETRY 1: CPT

## 2022-08-30 PROCEDURE — 96376 TX/PRO/DX INJ SAME DRUG ADON: CPT

## 2022-08-30 RX ORDER — LEVALBUTEROL 1.25 MG/.5ML
1.25 SOLUTION, CONCENTRATE RESPIRATORY (INHALATION) EVERY 6 HOURS PRN
Status: DISCONTINUED | OUTPATIENT
Start: 2022-08-30 | End: 2022-08-30 | Stop reason: HOSPADM

## 2022-08-30 RX ORDER — SODIUM CHLORIDE FOR INHALATION 0.9 %
3 VIAL, NEBULIZER (ML) INHALATION EVERY 8 HOURS PRN
Status: DISCONTINUED | OUTPATIENT
Start: 2022-08-30 | End: 2022-08-30 | Stop reason: HOSPADM

## 2022-08-30 RX ADMIN — Medication 2 PUFF: at 09:44

## 2022-08-30 RX ADMIN — ISOSORBIDE MONONITRATE 60 MG: 60 TABLET, EXTENDED RELEASE ORAL at 08:48

## 2022-08-30 RX ADMIN — ONDANSETRON 4 MG: 2 INJECTION INTRAMUSCULAR; INTRAVENOUS at 08:54

## 2022-08-30 RX ADMIN — SODIUM CHLORIDE, PRESERVATIVE FREE 10 ML: 5 INJECTION INTRAVENOUS at 08:50

## 2022-08-30 RX ADMIN — AMLODIPINE BESYLATE 10 MG: 10 TABLET ORAL at 08:48

## 2022-08-30 RX ADMIN — BUPROPION HYDROCHLORIDE 150 MG: 150 TABLET, EXTENDED RELEASE ORAL at 08:48

## 2022-08-30 RX ADMIN — ONDANSETRON 4 MG: 2 INJECTION INTRAMUSCULAR; INTRAVENOUS at 02:12

## 2022-08-30 RX ADMIN — MORPHINE SULFATE 2 MG: 2 INJECTION, SOLUTION INTRAMUSCULAR; INTRAVENOUS at 02:12

## 2022-08-30 RX ADMIN — PANTOPRAZOLE SODIUM 20 MG: 20 TABLET, DELAYED RELEASE ORAL at 07:05

## 2022-08-30 RX ADMIN — APIXABAN 2.5 MG: 2.5 TABLET, FILM COATED ORAL at 08:48

## 2022-08-30 RX ADMIN — SODIUM CHLORIDE, PRESERVATIVE FREE 10 ML: 5 INJECTION INTRAVENOUS at 08:54

## 2022-08-30 RX ADMIN — INSULIN GLARGINE 8 UNITS: 100 INJECTION, SOLUTION SUBCUTANEOUS at 08:49

## 2022-08-30 RX ADMIN — SODIUM CHLORIDE, PRESERVATIVE FREE 10 ML: 5 INJECTION INTRAVENOUS at 11:59

## 2022-08-30 ASSESSMENT — PAIN DESCRIPTION - ORIENTATION: ORIENTATION: LOWER

## 2022-08-30 ASSESSMENT — PAIN DESCRIPTION - PAIN TYPE: TYPE: ACUTE PAIN

## 2022-08-30 ASSESSMENT — PAIN DESCRIPTION - DESCRIPTORS: DESCRIPTORS: ACHING

## 2022-08-30 ASSESSMENT — PAIN SCALES - GENERAL
PAINLEVEL_OUTOF10: 1
PAINLEVEL_OUTOF10: 3
PAINLEVEL_OUTOF10: 8

## 2022-08-30 ASSESSMENT — PAIN DESCRIPTION - LOCATION: LOCATION: ABDOMEN

## 2022-08-30 NOTE — PROGRESS NOTES
Pt discharged to home left in hospital sponsored lyft vehicle with . Midline and tele box removed .  Pt left in personal clothing

## 2022-08-30 NOTE — CARE COORDINATION
CASE MANAGEMENT DISCHARGE SUMMARY:    DISCHARGE DATE: 08/30/22    DISCHARGED TO HOME     TRANSPORTATION: hospital sponsored [facility]    Denies any other needs.             Electronically signed by Nia Osman RN on 8/30/2022 at 12:07 PM

## 2022-08-30 NOTE — FLOWSHEET NOTE
Pt upset with fact that she was having to change rooms. Pt educated and informed that her room was needed because of the mobility equiptment that is needed for patient with those special needs. Pt and family member upset although they had already agreed to move. Pt requested to speak to a higher up. Charge Nurse [facility] aware.

## 2022-08-30 NOTE — PLAN OF CARE
Problem: Discharge Planning  Goal: Discharge to home or other facility with appropriate resources  8/30/2022 0608 by Tari Nesbitt RN  Outcome: Progressing  8/29/2022 [address] by Eric Martin RN  Outcome: Progressing     Problem: Pain  Goal: Verbalizes/displays adequate comfort level or baseline comfort level  8/30/2022 0608 by Tari Nesbitt RN  Outcome: Progressing  8/29/2022 [address] by Eric Martin RN  Outcome: Progressing     Problem: Safety - Adult  Goal: Free from fall injury  8/30/2022 0608 by Tari Nesbitt RN  Outcome: Progressing  Flowsheets (Taken 8/29/2022 1567)  Free From Fall Injury: Instruct family/caregiver on patient safety  8/29/2022 1829 by Eric Martin RN  Outcome: Progressing     Problem: Chronic Conditions and Co-morbidities  Goal: Patient's chronic conditions and co-morbidity symptoms are monitored and maintained or improved  8/30/2022 0608 by Tari Nesbitt RN  Outcome: Progressing  8/29/2022 [address] by Eric Martin RN  Outcome: Progressing     Problem: Cardiovascular - Adult  Goal: Maintains optimal cardiac output and hemodynamic stability  8/30/2022 0608 by Tari Nesbitt RN  Outcome: Progressing  8/29/2022 [address] by Eric Martin RN  Outcome: Progressing  Goal: Absence of cardiac dysrhythmias or at baseline  8/30/2022 0608 by Tari Nesbitt RN  Outcome: Progressing  8/29/2022 [address] by Eric Martin RN  Outcome: Progressing

## 2022-08-30 NOTE — PROGRESS NOTES
Nephrology Progress Note  [facility]  [phone]   [facility]. com    Patient:  Oren Gregory   : 1955    CC:  KOLBY, CKD    Subjective:  Mrs. Li Molina continues to have some chest discomfort. Stress test today . ROS:   No N/V. No shortness of breath or CP ? SHx:  Multiple family members visiting this afternoon. Meds:  Scheduled Meds:   mometasone-formoterol  2 puff Inhalation BID    linaclotide  145 mcg Oral QAM AC    amLODIPine  10 mg Oral Daily    apixaban  2.5 mg Oral BID    buPROPion  150 mg Oral BID    insulin glargine  8 Units SubCUTAneous BID    isosorbide mononitrate  60 mg Oral Daily    pantoprazole  20 mg Oral QAM AC    QUEtiapine  25 mg Oral Nightly    sodium chloride flush  5-40 mL IntraVENous 2 times per day    atorvastatin  40 mg Oral Nightly    metoprolol succinate  100 mg Oral Nightly    insulin lispro  0-8 Units SubCUTAneous TID WC    insulin lispro  0-4 Units SubCUTAneous Nightly    lidocaine 1 % injection  5 mL IntraDERmal Once    sodium chloride flush  5-40 mL IntraVENous 2 times per day     Continuous Infusions:   sodium chloride      dextrose      sodium chloride       PRN Meds:.levalbuterol, sodium chloride nebulizer, oxyCODONE-acetaminophen, sodium chloride flush, sodium chloride, ondansetron **OR** ondansetron, acetaminophen **OR** acetaminophen, polyethylene glycol, glucose, dextrose bolus **OR** dextrose bolus, glucagon (rDNA), dextrose, morphine **OR** morphine, sodium chloride flush, sodium chloride    Vitals:  BP (!) 144/67   Pulse 58   Temp 97.5 °F (36.4 °C) (Oral)   Resp 18   Ht 5' (1.524 m)   Wt 118 lb 2.7 oz (53.6 kg)   SpO2 99%   BMI 23.08 kg/m²     Physical Exam:  Gen: Resting in bed, NAD. HEENT: MMM, OP clear. CV: RRR, no S3.  Lungs: good respiratory effort and clear air entry   Abd: S/NT +BS  Ext: No edema, no cyanosis  Skin: Warm. No rashes appreciated.     Labs:  CBC:   Lab Results   Component Value Date/Time    WBC 5.2 08/29/2022 01:10 PM    RBC 3.17 08/29/2022 01:10 PM    HGB 9.6 08/29/2022 01:10 PM    HCT 29.5 08/29/2022 01:10 PM    MCV 93.1 08/29/2022 01:10 PM    MCH 30.4 08/29/2022 01:10 PM    MCHC 32.6 08/29/2022 01:10 PM    RDW 14.1 08/29/2022 01:10 PM     08/29/2022 01:10 PM    MPV 7.2 08/29/2022 01:10 PM     BMP:    Lab Results   Component Value Date/Time     08/30/2022 07:11 AM    K 4.2 08/30/2022 07:11 AM    K 4.1 08/27/2022 10:56 AM     08/30/2022 07:11 AM    CO2 26 08/30/2022 07:11 AM    BUN 20 08/30/2022 07:11 AM    LABALBU 3.7 08/29/2022 01:10 PM    CREATININE 1.4 08/30/2022 07:11 AM    CALCIUM 8.8 08/30/2022 07:11 AM    GFRAA 46 08/30/2022 07:11 AM    GFRAA 60 05/23/2013 02:56 PM    LABGLOM 38 08/30/2022 07:11 AM    GLUCOSE 152 08/30/2022 07:11 AM       Assessment/Plan:    1. KOLBY on CKD stage 3a: She follows with Dr Jacek Avila in the office, last seen in January. She has KOLBY from multiple KOLBY events in the past.  Baseline Cr is 1.2. Cr better  1.4 mg/dl . Dc Ivf      2. CAD with chest pain:    stress test noted . Troponin negative. Hx of multiple stents. 3.  HTN: controlled      4. Afib: She is s/p a Watchman device by Dr. Marques Ryan in July. 5. Anemia: B12/folate are normal.  Iron studies NOTED . S/P retacrit . Aimee Bonilla MD FACP   [facility]  Office Number: [email or web address]

## 2022-08-30 NOTE — DISCHARGE INSTR - COC
Continuity of Care Form    Patient Name: Gely Mendenhall   :  1955  MRN:  [id number]    Admit date:  2022  Discharge date:  ***    Code Status Order: Full Code   Advance Directives:     Admitting Physician:  Judith Regan MD  PCP: Amrit Arndt MD    Discharging Nurse: [facility] Unit/Room#: C6I-2137/8882-44  Discharging Unit Phone Number: ***    Emergency Contact:   Extended Emergency Contact Information  Primary Emergency Contact: Rolly Hensley [address] Phone: [phone]  Mobile Phone: [phone]  Relation: Child  Secondary Emergency Contact: Rodríguez Palomo  Address: Melita Cardenas [address] Phone: [phone]  Mobile Phone: [phone]  Relation: Child    Past Surgical History:  Past Surgical History:   Procedure Laterality Date    ABLATION OF DYSRHYTHMIC FOCUS    and 2020    times 2    ARTERY BIOPSY Right 2014    RIGHT TEMPORAL ARTERY BIOPSY    CAROTID ARTERY SURGERY Left     clean up per pt    CATARACT REMOVAL Bilateral     CHOLECYSTECTOMY      COLONOSCOPY N/A 2021    COLONOSCOPY WITH BIOPSY performed by Chloe Henriquez MD at [address] [place] [address] N/A 10/15/2021    COLONOSCOPY performed by Chloe Henriquez MD at [place] [address] (CERVIX STATUS UNKNOWN)      JOINT REPLACEMENT Right     KNEE ARTHROSCOPY Right     PTCA  10/2019    LAD and RCA inrtervention    TUNNELED VENOUS PORT PLACEMENT      left thigh.   SMART PORT-----Removed--total of 4 port placement and removal    UPPER GASTROINTESTINAL ENDOSCOPY N/A 2020    EGD DIAGNOSTIC ONLY performed by Reba Kunz MD at [address] [place] [address]       Immunization History:   Immunization History   Administered Date(s) Administered    COVID-19, PFIZER PURPLE top, DILUTE for use, (age 15 y+), 30mcg/0.3mL 02/15/2021, 2021    INFLUENZA, INTRADERMAL, QUADRIVALENT, PRESERVATIVE FREE 2017 Ischemic cardiomyopathy I25.5    Moderate episode of recurrent major depressive disorder (HCC) F33.1    Regular astigmatism, bilateral H52.223    CKD (chronic kidney disease) stage 3, GFR 30-59 ml/min (HCC) N18.30    Thrombosis of superior vena cava, 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    Type 2 diabetes mellitus with mild nonproliferative diabetic retinopathy without macular edema, bilateral (HCC) P54.5102    Moderate malnutrition (HCC) E44.0    Paroxysmal A-fib (HCC) I48.0    Chest pain R07.9       Isolation/Infection:   Isolation            No Isolation          Patient Infection Status       Infection Onset Added Last Indicated Last Indicated By Review Planned Expiration Resolved Resolved By    None active    Resolved    COVID-19 (Rule Out) 07/06/20 07/06/20 07/06/20 COVID-19 (Ordered)   07/06/20 Rule-Out Test Resulted            Nurse Assessment:  Last Vital Signs: BP (!) 169/50   Pulse 65   Temp 98.3 °F (36.8 °C) (Oral)   Resp 18   Ht 5' (1.524 m)   Wt 118 lb 2.7 oz (53.6 kg)   SpO2 94%   BMI 23.08 kg/m²     Last documented pain score (0-10 scale): Pain Level: 3  Last Weight:   Wt Readings from Last 1 Encounters:   08/30/22 118 lb 2.7 oz (53.6 kg)     Mental Status:  {IP PT MENTAL STATUS:10457}    IV Access:  { RONNIE IV ACCESS:712743418}    Nursing Mobility/ADLs:  Walking   {CHP DME OAJR:904116818}  Transfer  {P DME CZYB:352129340}  Bathing  {CHP DME JDMK:049272297}  Dressing  {P DME BOVU:467638272}  Toileting  {P DME LFMT:167244123}  Feeding  {P DME NJYO:741478400}  Med Admin  {P DME OAND:649306912}  Med Delivery   { RONNIE MED Delivery:790146985}    Wound Care Documentation and Therapy:        Elimination:  Continence:    Bowel: {YES / MR:54617}  Bladder: {YES / SV:83430}  Urinary Catheter: {Urinary Catheter:945272367}   Colostomy/Ileostomy/Ileal Conduit: {YES / ES:02791}       Date of Last BM: ***    Intake/Output Summary (Last 24 hours) at 8/30/2022 1002  Last data filed at 8/30/2022 0556  Gross per 24 hour   Intake 1648.24 ml   Output 650 ml   Net 998.24 ml     I/O last 3 completed shifts: In: 1898.2 [P.O.:1080; I.V.:818.2]  Out: 1000 [Urine:1000]    Safety Concerns:     [employer] Safety Concerns:745582768}    Impairments/Disabilities:      [address] [employer] Impairments/Disabilities:924048410}    Nutrition Therapy:  Current Nutrition Therapy:   [facility] [employer] Diet List:074986216}    Routes of Feeding: {CHP DME Other Feedings:344384408}  Liquids: {Slp liquid thickness:97270}  Daily Fluid Restriction: {CHP DME Yes amt example:588786104}  Last Modified Barium Swallow with Video (Video Swallowing Test): {Done Not Done VQS}    Treatments at the Time of Hospital Discharge:   Respiratory Treatments: ***  Oxygen Therapy:  {Therapy; copd oxygen:14923}  Ventilator:    { CC Vent DKL}    Rehab Therapies: {THERAPEUTIC INTERVENTION:9833470670}  Weight Bearing Status/Restrictions: [address] Micky  Weight Bearin}  Other Medical Equipment (for information only, NOT a DME order):  {EQUIPMENT:469501097}  Other Treatments: ***    Patient's personal belongings (please select all that are sent with patient):  {[facility] DME Belongings:115023210}    RN SIGNATURE:  {Esignature:631323350}    CASE MANAGEMENT/SOCIAL WORK SECTION    Inpatient Status Date: ***    Readmission Risk Assessment Score:  Readmission Risk              Risk of Unplanned Readmission:  0           Discharging to Facility/ Agency   Name:   Address:  Phone:  Fax:    Dialysis Facility (if applicable)   Name:  Address:  Dialysis Schedule:  Phone:  Fax:    / signature: {Esignature:832382906}    PHYSICIAN SECTION    Prognosis: Fair    Condition at Discharge: Stable    Rehab Potential (if transferring to Rehab): Good    Recommended Labs or Other Treatments After Discharge: Follow-up with PCP within 7 days from discharge, not doing so could have life-threatening consequences.    Take medication as instructed;  return to the emergency department if persistent symptoms, experiencing side effects from medication including nausea vomiting or unable to keep medications down. Follow up with cardiology and nephrology recommended    Physician Certification: I certify the above information and transfer of Steph Gomes  is necessary for the continuing treatment of the diagnosis listed and that she requires [address] for greater 30 days.      Update Admission H&P: No change in H&P    PHYSICIAN SIGNATURE:  Electronically signed by Romie Loving MD on 8/30/22 at 10:03 AM EDT

## 2022-08-30 NOTE — CARE COORDINATION
Verified address, lives alone in a senior apartment, 0 ZE & elevator access. Active with [employer] for transporation, MOW, life alert, aide for homemaking tasks (5hr/day, 5 days/wk). Uses [facility], denies any barriers. Expecting dc today, plans to return home alone, may need a ride. 08/30/22 1001   Service Assessment   Patient Orientation Alert and Oriented;Person;Place;Situation;Self   Cognition Alert   History Provided By Patient   Primary [address] Family Members   Patient's Healthcare Decision Maker is: Legal Next of Constance 70   PCP Verified by CM Yes   Prior Functional Level Independent in ADLs/IADLs   Current Functional Level Independent in ADLs/IADLs   Can patient return to prior living arrangement Yes   Ability to make needs known: Good   Family able to assist with home care needs: Yes   Financial Resources Medicaid; Medicare   Community Resources Transportation   Social/Functional History   Lives With Alone   Type of Home Apartment   Home Access Elevator;Level entry   [address] [place] chair;Grab bars in shower;Built-in shower seat;Grab bars around toilet   [facility] Help From Other (comment)  (active with [employer]-MOW, aide for homemaking tasks (5hr/day, 5days/wk))   ADL Assistance Needs assistance   [address] Needs assistance   Ambulation Assistance Independent   Transfer Assistance Independent   Active  No   Patient's  Info grandson or medical transport from [address], may need Lyft ride home   Discharge Planning   Type of [address] Prior To Admission None   Potential Assistance Needed N/A   DME Ordered?  No   Potential Assistance Purchasing Medications No   Type of Home Care Services None   Patient expects to be discharged to: Apartment   One/Two Story Residence One story   [facility] Discharge   [facility] None     Arline Brennan, RN, BSN,   [phone]  Electronically signed by Arline Brennan RN on 8/30/2022 at 10:07 AM

## 2022-08-30 NOTE — DISCHARGE SUMMARY
Hospital Medicine Discharge Summary    Patient ID: Saintclair Simpler      Patient's PCP: Annie Ramirez MD    Admit Date: 8/27/2022     Discharge Date:  08/30/22    Admitting Provider: Daniel Gaines MD     Discharge Provider: Daniel Gaines MD     Discharge Diagnoses: Active Hospital Problems    Diagnosis     Chest pain [R07.9]      Priority: Medium       The patient was seen and examined on day of discharge and this discharge summary is in conjunction with any daily progress note from day of discharge. Hospital Course: 69-year-old female with past medical history significant for insulin-dependent diabetes, CAD with multiple stents, atrial fibrillation on chronic anticoagulation with Eliquis presents to outside emergency department complaints of chest pain. Patient reports waking up with chest pain this morning, 10 out of 10, substernal, pressure-like, no radiation. Patient was afraid she was having a heart attack and therefore presented to the emergency department     Emergency department work-up was unremarkable. Troponin was negative EKG with no new changes, proBNP level 1338.     8/28  Asymptomatic. Cardiac enzymes remain negative. Stress test is pending     8/29  Stress test came back negative this morning. However her creatinine went from 1.4-1.8. Nephrology recommended to keep overnight, patient started on IV fluids, checking urine studies. 8/30  Patient had an uneventful night. Reports persistent sharp episode of chest pain worse with inspiration, most likely musculoskeletal in nature. Patient appears to be saturating well on room air, low probability for PE. She is already on anticoagulation with Eliquis. Renal function returning to baseline. Patient will be discharged to follow-up with cardiology and  nephrology.           Physical Exam Performed:     BP (!) 169/50   Pulse 65   Temp 98.3 °F (36.8 °C) (Oral)   Resp 18   Ht 5' (1.524 m)   Wt 118 lb 2.7 oz (53.6 kg)   SpO2 94%   BMI 23.08 kg/m²       General appearance:  No apparent distress, appears stated age and cooperative. HEENT:  Normal cephalic, atraumatic without obvious deformity. Pupils equal, round, and reactive to light. Extra ocular muscles intact. Conjunctivae/corneas clear. Neck: Supple, with full range of motion. No jugular venous distention. Trachea midline. Respiratory:  Normal respiratory effort. Clear to auscultation, bilaterally without Rales/Wheezes/Rhonchi. Cardiovascular:  Regular rate and rhythm with normal S1/S2 without murmurs, rubs or gallops. Abdomen: Soft, non-tender, non-distended with normal bowel sounds. Musculoskeletal:  No clubbing, cyanosis or edema bilaterally. Full range of motion without deformity. Skin: Skin color, texture, turgor normal.  No rashes or lesions. Neurologic:  Neurovascularly intact without any focal sensory/motor deficits. Cranial nerves: II-XII intact, grossly non-focal.  Psychiatric:  Alert and oriented, thought content appropriate, normal insight  Capillary Refill: Brisk,< 3 seconds   Peripheral Pulses: +2 palpable, equal bilaterally       Labs: For convenience and continuity at follow-up the following most recent labs are provided:      CBC:    Lab Results   Component Value Date/Time    WBC 5.2 08/29/2022 01:10 PM    HGB 9.6 08/29/2022 01:10 PM    HCT 29.5 08/29/2022 01:10 PM     08/29/2022 01:10 PM       Renal:    Lab Results   Component Value Date/Time     08/30/2022 07:11 AM    K 4.2 08/30/2022 07:11 AM    K 4.1 08/27/2022 10:56 AM     08/30/2022 07:11 AM    CO2 26 08/30/2022 07:11 AM    BUN 20 08/30/2022 07:11 AM    CREATININE 1.4 08/30/2022 07:11 AM    CALCIUM 8.8 08/30/2022 07:11 AM    PHOS 3.5 08/29/2022 01:10 PM         Significant Diagnostic Studies    Radiology:   NM MYOCARDIAL SPECT REST EXERCISE OR RX   Final Result      XR CHEST (2 VW)   Final Result   No acute cardiopulmonary disease.                 Consults: Refills: 1    Associated Diagnoses: Chronic pain syndrome      tiZANidine (ZANAFLEX) 4 MG tablet Take 0.5-1 tablets by mouth 2 times daily as needed (muscle spasms)  Qty: 60 tablet, Refills: 1    Associated Diagnoses: Chronic pain syndrome; Fibromyalgia; DDD (degenerative disc disease), lumbar; Chronic painful diabetic neuropathy ([address] Tendinitis of right rotator cuff; DDD (degenerative disc disease), cervical      montelukast (SINGULAIR) 10 MG tablet TAKE 1 TABLET BY MOUTH DAILY  Qty: 30 tablet, Refills: 1    Associated Diagnoses: Allergic rhinitis due to other allergic trigger, unspecified seasonality      omeprazole (PRILOSEC) 20 MG delayed release capsule TAKE 1 CAPSULE BY MOUTH DAILY  Qty: 30 capsule, Refills: 1    Associated Diagnoses: Essential hypertension      amLODIPine (NORVASC) 10 MG tablet Take 1 tablet by mouth in the morning. Qty: 90 tablet, Refills: 1    Associated Diagnoses: Essential hypertension      metoprolol succinate (TOPROL XL) 50 MG extended release tablet Take 1 tablet by mouth nightly  Qty: 90 tablet, Refills: 5    Associated Diagnoses: Essential hypertension      Ubrogepant (UBRELVY) 50 MG TABS Take 1 tablet by mouth daily Take 1 tablet po PRN at start of headaches, can repeat in 24 hours  Qty: 30 tablet, Refills: 1    Associated Diagnoses: Chronic pain syndrome      apixaban (ELIQUIS) 2.5 MG TABS tablet Take 1 tablet by mouth in the morning and 1 tablet before bedtime. Qty: 180 tablet, Refills: 1    Associated Diagnoses: Paroxysmal A-fib ([address]      ! !  Continuous Blood Gluc Sensor (DEXCOM G6 SENSOR) MISC 1 each by Does not apply route every 14 days  Qty: 3 each, Refills: 1    Associated Diagnoses: Type 2 diabetes mellitus with other circulatory complication, with long-term current use of insulin (HCC)      buPROPion (ZYBAN) 150 MG extended release tablet Take 1 tablet by mouth 2 times daily  Qty: 60 tablet, Refills: 5    Associated Diagnoses: Positive depression screening clotrimazole-betamethasone (LOTRISONE) 1-0.05 % cream Apply topically 2 times daily. Qty: 5 g, Refills: 5    Associated Diagnoses: Tinea pedis, left      !! Continuous Blood Gluc  (DEXCOM G6 ) DAYO Change sensor every 10 days  Qty: 1 each, Refills: 0    Comments: 1 kit  Lot [id number]  Exp 6/24/2022  Associated Diagnoses: Type 2 diabetes mellitus with other circulatory complication, with long-term current use of insulin ([facility])      albuterol sulfate  (90 Base) MCG/ACT inhaler INHALE 2 PUFFS INTO THE LUNGS EVERY 4 HOURS AS NEEDED FOR WHEEZING OR SHORTNESS OF BREATH  Qty: 54 g, Refills: 5    Associated Diagnoses: COPD with acute bronchitis (HCC)      ASPIRIN LOW DOSE 81 MG EC tablet TAKE 1 TABLET BY MOUTH DAILY  Qty: 90 tablet, Refills: 5    Associated Diagnoses: Coronary artery disease involving native coronary artery of native heart without angina pectoris; Uncontrolled type 2 diabetes mellitus with complication, with long-term current use of insulin ([facility])      Continuous Blood Gluc Transmit (DEXCOM G6 TRANSMITTER) MISC 1 each by Does not apply route daily  Qty: 1 each, Refills: 2    Associated Diagnoses: Type 2 diabetes mellitus with other circulatory complication, with long-term current use of insulin ([facility]      ! !  Continuous Blood Gluc  ([address]) DAYO 1 each by Does not apply route daily  Qty: 1 each, Refills: 1    Associated Diagnoses: Type 2 diabetes mellitus with other circulatory complication, with long-term current use of insulin ([facility])      albuterol (PROVENTIL) (2.5 MG/3ML) 0.083% nebulizer solution TAKE 3 MLS BY NEBULIZATION EVERY 4 HOURS AS NEEDED FOR WHEEZING  Qty: 360 mL, Refills: 5    Associated Diagnoses: COPD with acute bronchitis (HCC)      ULTICARE MINI PEN NEEDLES 31G X 6 MM MISC USE WITH INSULINS FOUR TIMES A DAY  Qty: 90 each, Refills: 1      insulin glargine (BASAGLAR KWIKPEN) 100 UNIT/ML injection pen Inject 8 Units into the skin 2 times daily  Qty: 5 pen, Refills: 3    Associated Diagnoses: Type 2 diabetes mellitus with other circulatory complication, with long-term current use of insulin ([facility])      isosorbide mononitrate (IMDUR) 60 MG extended release tablet Take 1 tablet by mouth daily  Qty: 90 tablet, Refills: 5    Associated Diagnoses: Essential hypertension; Type 2 diabetes mellitus with other circulatory complication, with long-term current use of insulin ([facility])      nitroGLYCERIN (NITRODUR) 0.1 MG/HR Place 1 patch onto the skin every 24 hours  Qty: 30 patch, Refills: 0    Associated Diagnoses: Essential hypertension; Type 2 diabetes mellitus with other circulatory complication, with long-term current use of insulin ([facility])      docusate sodium (COLACE) 100 MG capsule Take 100 mg by mouth 2 times daily      atorvastatin (LIPITOR) 80 MG tablet TAKE 1 TABLET BY MOUTH NIGHTLY  Qty: 90 tablet, Refills: 5    Associated Diagnoses: Coronary artery disease involving native coronary artery of native heart without angina pectoris; Uncontrolled type 2 diabetes mellitus with complication, with long-term current use of insulin ([facility])      ranolazine (RANEXA) 1000 MG extended release tablet Take 1 tablet by mouth 2 times daily  Qty: 60 tablet, Refills: 8    Associated Diagnoses: Coronary artery disease involving native coronary artery of native heart without angina pectoris      linaclotide (LINZESS) 145 MCG capsule Take 1 capsule by mouth every morning (before breakfast)  Qty: 30 capsule, Refills: 5      nitroGLYCERIN (NITROSTAT) 0.4 MG SL tablet Place 1 tablet under the tongue every 5 minutes as needed for Chest pain up to max of 3 total doses. If no relief after 1 dose, call 911. Qty: 25 tablet, Refills: 3    Associated Diagnoses: Coronary artery disease involving native coronary artery of native heart without angina pectoris       ! ! - Potential duplicate medications found. Please discuss with provider.           Time Spent on discharge is more than 30 minutes in the examination, evaluation, counseling and review of medications and discharge plan. Signed:    Pradip Sesay MD   8/30/2022      Thank you Elia Mitchell MD for the opportunity to be involved in this patient's care. If you have any questions or concerns, please feel free to contact me at [phone].

## 2022-08-30 NOTE — ACP (ADVANCE CARE PLANNING)
Advance Care Planning     Advance Care Planning Activator (Inpatient)  Conversation Note      Date of ACP Conversation: 8/30/2022     Conversation Conducted with: Patient with Decision Making Capacity    ACP Activator: Radha Gabriel, [address] [place] [address] Maker:     Current Designated Health Care Decision Maker:     Primary Decision Maker: [address] - [phone]    Secondary Decision Maker: Carl Calvert Child - [phone]    Secondary Decision Maker: Sammy Dukes Child - [phone]    Today we documented Decision Maker(s) consistent with Legal Next of Kin hierarchy. Care Preferences    Ventilation: \"If you were in your present state of health and suddenly became very ill and were unable to breathe on your own, what would your preference be about the use of a ventilator (breathing machine) if it were available to you? \"      Would the patient desire the use of ventilator (breathing machine)?: yes    \"If your health worsens and it becomes clear that your chance of recovery is unlikely, what would your preference be about the use of a ventilator (breathing machine) if it were available to you? \"     Would the patient desire the use of ventilator (breathing machine)?: No      Resuscitation  \"CPR works best to restart the heart when there is a sudden event, like a heart attack, in someone who is otherwise healthy. Unfortunately, CPR does not typically restart the heart for people who have serious health conditions or who are very sick. \"    \"In the event your heart stopped as a result of an underlying serious health condition, would you want attempts to be made to restart your heart (answer \"yes\" for attempt to resuscitate) or would you prefer a natural death (answer \"no\" for do not attempt to resuscitate)? \" yes       [] Yes   [] No   Educated Patient / [place] regarding differences between Advance Directives and portable DNR orders.     Length of ACP Conversation in minutes: 4 min Conversation Outcomes:  [x] ACP discussion completed  [] Existing advance directive reviewed with patient; no changes to patient's previously recorded wishes  [] New Advance Directive completed  [] Portable Do Not Rescitate prepared for Provider review and signature  [] POLST/POST/MOLST/MOST prepared for Provider review and signature      Follow-up plan:    [] Schedule follow-up conversation to continue planning  [] Referred individual to Provider for additional questions/concerns   [] Advised patient/agent/surrogate to review completed ACP document and update if needed with changes in condition, patient preferences or care setting    [x] This note routed to one or more involved healthcare providers        Ray Castaneda RN, BSN,   [phone]  Electronically signed by Ray Castaneda RN on 8/30/2022 at 10:08 AM

## 2022-08-31 ENCOUNTER — TELEPHONE (OUTPATIENT)
Dept: PAIN MANAGEMENT | Age: 67
End: 2022-08-31

## 2022-09-01 ENCOUNTER — TELEPHONE (OUTPATIENT)
Dept: PRIMARY CARE CLINIC | Age: 67
End: 2022-09-01

## 2022-09-01 NOTE — TELEPHONE ENCOUNTER
[facility] Transitions Initial Follow Up Call    Outreach made within 2 business days of discharge: Yes    Patient: Mary Choen Patient : 1955   MRN: [id number]  Reason for Admission: There are no discharge diagnoses documented for the most recent discharge. Discharge Date: 22       Spoke with: Maren Srivastava department/facility: [facility]    TCM Interactive Patient Contact:  Was patient able to fill all prescriptions: Yes  Was patient instructed to bring all medications to the follow-up visit: Yes  Is patient taking all medications as directed in the discharge summary?  Yes  Does patient understand their discharge instructions: Yes  Does patient have questions or concerns that need addressed prior to 7-14 day follow up office visit: no    Scheduled appointment with PCP within 7-14 days    Follow Up  Future Appointments   Date Time Provider Yoni De Jesus   2022  1:00 PM CYRUS Blackman CNP, RD [facility] [place] - DYD   2022 11:45 AM Everett Reddy MD [facility] Pain [facility]   2022 11:40 AM CYRUS Carey CNP [facility]       Taurus Mosqueda MA

## 2022-09-08 NOTE — CARE COORDINATION
After visit summary sent to [facility]. Fax:  [phone]    Kathryn Daily Sr.  Administrative Assist, Case Management  [phone]  Electronically signed by Kathryn Daily on 9/8/2022 at 12:44 PM

## 2022-09-10 DIAGNOSIS — I10 ESSENTIAL HYPERTENSION: ICD-10-CM

## 2022-09-10 DIAGNOSIS — J30.89 ALLERGIC RHINITIS DUE TO OTHER ALLERGIC TRIGGER, UNSPECIFIED SEASONALITY: ICD-10-CM

## 2022-09-12 RX ORDER — HYDROXYZINE HYDROCHLORIDE 25 MG/1
25 TABLET, FILM COATED ORAL EVERY 8 HOURS PRN
Qty: 30 TABLET | Refills: 5 | OUTPATIENT
Start: 2022-09-12 | End: 2022-09-22

## 2022-09-12 RX ORDER — LABETALOL 200 MG/1
200 TABLET, FILM COATED ORAL 2 TIMES DAILY
Qty: 60 TABLET | Refills: 3 | OUTPATIENT
Start: 2022-09-12

## 2022-09-12 RX ORDER — MONTELUKAST SODIUM 10 MG/1
10 TABLET ORAL DAILY
Qty: 30 TABLET | Refills: 1 | OUTPATIENT
Start: 2022-09-12

## 2022-09-20 ENCOUNTER — OFFICE VISIT (OUTPATIENT)
Dept: PAIN MANAGEMENT | Age: 67
End: 2022-09-20
Payer: COMMERCIAL

## 2022-09-20 VITALS
OXYGEN SATURATION: 97 % | WEIGHT: 127.2 LBS | BODY MASS INDEX: 24.84 KG/M2 | HEART RATE: 64 BPM | DIASTOLIC BLOOD PRESSURE: 70 MMHG | SYSTOLIC BLOOD PRESSURE: 156 MMHG

## 2022-09-20 DIAGNOSIS — G43.119 INTRACTABLE MIGRAINE WITH AURA WITHOUT STATUS MIGRAINOSUS: ICD-10-CM

## 2022-09-20 DIAGNOSIS — M50.30 DDD (DEGENERATIVE DISC DISEASE), CERVICAL: ICD-10-CM

## 2022-09-20 DIAGNOSIS — M79.7 FIBROMYALGIA: ICD-10-CM

## 2022-09-20 DIAGNOSIS — M51.36 DDD (DEGENERATIVE DISC DISEASE), LUMBAR: ICD-10-CM

## 2022-09-20 DIAGNOSIS — M75.81 ROTATOR CUFF TENDONITIS, RIGHT: ICD-10-CM

## 2022-09-20 DIAGNOSIS — E11.40 CHRONIC PAINFUL DIABETIC NEUROPATHY (HCC): ICD-10-CM

## 2022-09-20 DIAGNOSIS — F11.10 NARCOTIC ABUSE (HCC): ICD-10-CM

## 2022-09-20 DIAGNOSIS — K21.9 GASTRO-ESOPHAGEAL REFLUX DISEASE WITHOUT ESOPHAGITIS: ICD-10-CM

## 2022-09-20 DIAGNOSIS — F51.01 PRIMARY INSOMNIA: ICD-10-CM

## 2022-09-20 DIAGNOSIS — M75.81 TENDINITIS OF RIGHT ROTATOR CUFF: ICD-10-CM

## 2022-09-20 DIAGNOSIS — F33.1 MODERATE EPISODE OF RECURRENT MAJOR DEPRESSIVE DISORDER (HCC): ICD-10-CM

## 2022-09-20 DIAGNOSIS — G89.4 CHRONIC PAIN SYNDROME: ICD-10-CM

## 2022-09-20 PROCEDURE — G8400 PT W/DXA NO RESULTS DOC: HCPCS | Performed by: INTERNAL MEDICINE

## 2022-09-20 PROCEDURE — 1090F PRES/ABSN URINE INCON ASSESS: CPT | Performed by: INTERNAL MEDICINE

## 2022-09-20 PROCEDURE — 2022F DILAT RTA XM EVC RTNOPTHY: CPT | Performed by: INTERNAL MEDICINE

## 2022-09-20 PROCEDURE — G8420 CALC BMI NORM PARAMETERS: HCPCS | Performed by: INTERNAL MEDICINE

## 2022-09-20 PROCEDURE — 3052F HG A1C>EQUAL 8.0%<EQUAL 9.0%: CPT | Performed by: INTERNAL MEDICINE

## 2022-09-20 PROCEDURE — 3017F COLORECTAL CA SCREEN DOC REV: CPT | Performed by: INTERNAL MEDICINE

## 2022-09-20 PROCEDURE — G8427 DOCREV CUR MEDS BY ELIG CLIN: HCPCS | Performed by: INTERNAL MEDICINE

## 2022-09-20 PROCEDURE — 99214 OFFICE O/P EST MOD 30 MIN: CPT | Performed by: INTERNAL MEDICINE

## 2022-09-20 PROCEDURE — 1036F TOBACCO NON-USER: CPT | Performed by: INTERNAL MEDICINE

## 2022-09-20 PROCEDURE — 1123F ACP DISCUSS/DSCN MKR DOCD: CPT | Performed by: INTERNAL MEDICINE

## 2022-09-20 RX ORDER — DULOXETIN HYDROCHLORIDE 60 MG/1
60 CAPSULE, DELAYED RELEASE ORAL DAILY
Qty: 30 CAPSULE | Refills: 1 | Status: SHIPPED | OUTPATIENT
Start: 2022-09-20 | End: 2022-10-18 | Stop reason: SDUPTHER

## 2022-09-20 RX ORDER — ERENUMAB-AOOE 140 MG/ML
140 INJECTION, SOLUTION SUBCUTANEOUS
Qty: 1 ADJUSTABLE DOSE PRE-FILLED PEN SYRINGE | Refills: 0 | Status: SHIPPED | OUTPATIENT
Start: 2022-09-20 | End: 2022-10-18 | Stop reason: SDUPTHER

## 2022-09-20 RX ORDER — QUETIAPINE FUMARATE 25 MG/1
25-50 TABLET, FILM COATED ORAL NIGHTLY
Qty: 60 TABLET | Refills: 1 | Status: SHIPPED | OUTPATIENT
Start: 2022-09-20 | End: 2022-10-18 | Stop reason: SDUPTHER

## 2022-09-20 RX ORDER — TIZANIDINE 4 MG/1
2-4 TABLET ORAL 2 TIMES DAILY PRN
Qty: 60 TABLET | Refills: 1 | Status: SHIPPED | OUTPATIENT
Start: 2022-09-20 | End: 2022-10-18 | Stop reason: SDUPTHER

## 2022-09-20 RX ORDER — OXYCODONE AND ACETAMINOPHEN 7.5; 325 MG/1; MG/1
1 TABLET ORAL EVERY 6 HOURS PRN
Qty: 100 TABLET | Refills: 0 | Status: SHIPPED | OUTPATIENT
Start: 2022-09-20 | End: 2022-10-18

## 2022-09-20 NOTE — PROGRESS NOTES
Claire Angel  1955  [id number]    HISTORY OF PRESENT ILLNESS:  Ms. Vika Post is a 73 y.o. female returns for a follow up visit for multiple medical problems. Her  presenting problems are   1. Chronic pain syndrome    2. Fibromyalgia    3. DDD (degenerative disc disease), lumbar    4. Tendinitis of right rotator cuff    5. DDD (degenerative disc disease), cervical    6. Primary insomnia    7. Gastro-esophageal reflux disease without esophagitis    8. Intractable migraine with aura without status migrainosus    9. Rotator cuff tendonitis, right    10. Chronic painful diabetic neuropathy ([facility] [address]    11. Moderate episode of recurrent major depressive disorder ([facility] [address]    . As per information/history obtained from the PADT(patient assessment and documentation tool) -  She complains of pain in the head, neck, upper back, lower back, and knees Right with radiation to the shoulders Bilateral, buttocks, hips Right, upper leg Right, and lower leg Right She rates the pain 9/10 and describes it as sharp. Pain is made worse by: nothing, movement, walking, standing, sitting, bending, lifting. Current treatment regimen has helped relieve about 10% of the pain. She denies side effects from the current pain regimen. Patient reports that since the last follow up visit the physical functioning is unchanged, family/social relationships are unchanged, mood is unchanged and sleep patterns are unchanged, and that the overall functioning is unchanged. Patient denies neurological bowel or bladder. Patient denies misusing/abusing her narcotic pain medications or using any illegal drugs. There are Some indicators for possible drug abuse, addiction or diversion problems. Upon obtaining the medical history from Ms. Vika Post regarding today's office visit for her presenting problems, patient states she has been having increase headaches, she is having almost daily.  Ms. Vika Post states she has been unable to get the [facility], she states she is using Leong Layer which helps some. Patient states her sleep is fair. Has fairly normal sleep latency. Averages about 4-6 hours of sleep a night. Denies any signs of sleep apnea. Feels somewhat rested in the morning, she is on Seroquel. Patient's  subjective report of her mood is fair. she describes occasional symptoms of depression, occasional  irritability and some mood swings. Describes her mood as being neutral and reports some pleasure in her daily activities. Reports  fair  appetite, energy and concentration. Able to function well in different aspects of her daily activities. Denies suicidal or homicidal ideation. Denies any complaints of increased tension, does   Worry sometimes and occasional  irritability  she denies any c/o increased anxiety, No c/o panic attacks or symptoms of PTSD, she is on Cymbalta. Patient reports she is managing light house chores. ALLERGIES/PAST MED/FAM/SOC HISTORY: Ms. Geneva Gallagher allergies, past medical, family and social history were reviewed in the chart. Ms. Geneva Gallagher current medications are   Outpatient Medications Prior to Visit   Medication Sig Dispense Refill    Continuous Blood Gluc  (FREESTYLE LISSETT 14 DAY READER) DAYO 1 each by Does not apply route 3 times daily 1 each 1    Continuous Blood Gluc Sensor (FREESTYLE LISSETT 14 DAY SENSOR) MISC 1 each by Does not apply route every 14 days 2 each 5    oxyCODONE-acetaminophen (PERCOCET) 7.5-325 MG per tablet Take 1 tablet by mouth every 6 hours as needed for Pain (max 3-4 per day) for up to 28 days.  100 tablet 0    DULoxetine (CYMBALTA) 60 MG extended release capsule Take 1 capsule by mouth daily 30 capsule 1    QUEtiapine (SEROQUEL) 25 MG tablet Take 1-2 tablets by mouth nightly 60 tablet 1    tiZANidine (ZANAFLEX) 4 MG tablet Take 0.5-1 tablets by mouth 2 times daily as needed (muscle spasms) 60 tablet 1    montelukast (SINGULAIR) 10 MG tablet TAKE 1 TABLET BY MOUTH DAILY 30 tablet 1    omeprazole (PRILOSEC) 20 MG delayed release capsule TAKE 1 CAPSULE BY MOUTH DAILY 30 capsule 1    amLODIPine (NORVASC) 10 MG tablet Take 1 tablet by mouth in the morning. 90 tablet 1    metoprolol succinate (TOPROL XL) 50 MG extended release tablet Take 1 tablet by mouth nightly 90 tablet 5    Ubrogepant (UBRELVY) 50 MG TABS Take 1 tablet by mouth daily Take 1 tablet po PRN at start of headaches, can repeat in 24 hours 30 tablet 1    apixaban (ELIQUIS) 2.5 MG TABS tablet Take 1 tablet by mouth in the morning and 1 tablet before bedtime. 180 tablet 1    Continuous Blood Gluc Sensor (DEXCOM G6 SENSOR) MISC 1 each by Does not apply route every 14 days 3 each 1    buPROPion (ZYBAN) 150 MG extended release tablet Take 1 tablet by mouth 2 times daily 60 tablet 5    clotrimazole-betamethasone (LOTRISONE) 1-0.05 % cream Apply topically 2 times daily.  5 g 5    Continuous Blood Gluc  (DEXCOM G6 ) DAYO Change sensor every 10 days 1 each 0    albuterol sulfate  (90 Base) MCG/ACT inhaler INHALE 2 PUFFS INTO THE LUNGS EVERY 4 HOURS AS NEEDED FOR WHEEZING OR SHORTNESS OF BREATH 54 g 5    ASPIRIN LOW DOSE 81 MG EC tablet TAKE 1 TABLET BY MOUTH DAILY 90 tablet 5    Continuous Blood Gluc Transmit (DEXCOM G6 TRANSMITTER) MISC 1 each by Does not apply route daily 1 each 2    Continuous Blood Gluc  (DEXCOM G6 ) DAYO 1 each by Does not apply route daily 1 each 1    albuterol (PROVENTIL) (2.5 MG/3ML) 0.083% nebulizer solution TAKE 3 MLS BY NEBULIZATION EVERY 4 HOURS AS NEEDED FOR WHEEZING 360 mL 5    ULTICARE MINI PEN NEEDLES 31G X 6 MM MISC USE WITH INSULINS FOUR TIMES A DAY 90 each 1    insulin glargine (BASAGLAR KWIKPEN) 100 UNIT/ML injection pen Inject 8 Units into the skin 2 times daily 5 pen 3    isosorbide mononitrate (IMDUR) 60 MG extended release tablet Take 1 tablet by mouth daily 90 tablet 5    nitroGLYCERIN (NITRODUR) 0.1 MG/HR Place 1 patch onto the skin every 24 hours 30 patch 0    docusate sodium (COLACE) 100 MG capsule Take 100 mg by mouth 2 times daily      atorvastatin (LIPITOR) 80 MG tablet TAKE 1 TABLET BY MOUTH NIGHTLY 90 tablet 5    ranolazine (RANEXA) 1000 MG extended release tablet Take 1 tablet by mouth 2 times daily 60 tablet 8    linaclotide (LINZESS) 145 MCG capsule Take 1 capsule by mouth every morning (before breakfast) 30 capsule 5    nitroGLYCERIN (NITROSTAT) 0.4 MG SL tablet Place 1 tablet under the tongue every 5 minutes as needed for Chest pain up to max of 3 total doses. If no relief after 1 dose, call 911. 25 tablet 3     No facility-administered medications prior to visit. REVIEW OF SYSTEMS: .   Respiratory: Negative for shortness of breath. Cardiovascular: Negative for chest pain, palpitations  Gastrointestinal: Negative for blood in stool, abdominal distention, nausea, vomiting, abdominal pain, diarrhea,constipation. Neurological: Negative for speech difficulty, weakness and light-headedness, dizziness, tremors, sleepiness  Psychiatric/Behavioral: Negative for suicidal ideas, hallucinations, behavioral problems, self-injury, decreased concentration/cognition, agitation, confusion. PHYSICAL EXAM:   Nursing note and vitals reviewed. BP (!) 156/70   Pulse 64   Wt 127 lb 3.2 oz (57.7 kg)   SpO2 97%   BMI 24.84 kg/m²   General Appearance: Patient is well nourished, well developed, well groomed and in no acute distress. Skin: Skin is warm and dry, good turgor . No rash or lesions noted. She is not diaphoretic. Pulmonary/Chest: Effort normal. No respiratory distress or use of accessory muscles. Auscultation revealing normal air entry. She does not have wheezes in the lung fields. She has no rales. Cardiovascular: Normal rate, regular rhythm, normal heart sounds, and does not have murmur. Exam reveals no gallop and no friction rub. Musculoskeletal / Extremities: Range of motion is normal. Gait is normal, assistive devices use: none.     She exhibits edema: none, and no tenderness. Neurological/Psychiatric:She is alert and oriented to person, place, and time. Coordination is  normal.   Judgement and Insight is normal  Her mood is Appropriate and affect is Neutral/Euthymic(normal) . Her behavior is normal.   thought content normal.        IMPRESSION:     1. Narcotic abuse ([facility] [address] - NEW PROBLEM: see treatment plan below    2. Intractable migraine with aura without status migrainosus - INADEQUATELY CONTROLLED: treatment plan adjusted as below    3. Chronic pain syndrome - INADEQUATELY CONTROLLED: treatment plan adjusted as below    4. Fibromyalgia - STABLE: Continue current treatment plan   5. DDD (degenerative disc disease), lumbar - STABLE: Continue current treatment plan   6. Tendinitis of right rotator cuff - STABLE: Continue current treatment plan   7. DDD (degenerative disc disease), cervical - STABLE: Continue current treatment plan   8. Primary insomnia - STABLE: Continue current treatment plan   9. Gastro-esophageal reflux disease without esophagitis - STABLE: Continue current treatment plan   10. Rotator cuff tendonitis, right - STABLE: Continue current treatment plan   11. Chronic painful diabetic neuropathy ([facility]) - STABLE: Continue current treatment plan   12. Moderate episode of recurrent major depressive disorder ([facility] [address] - STABLE: Continue current treatment plan       PLAN:  Informed verbal consent was obtained. -Urine drug screen with GC/MS confirmation for opiates and drugs of abuse was reviewed and the results are negative for drugs of abuse and the prescribed medications were present, +Oxycodone (P) but +for Tramadol (NP). The medications' drug  levels are normal Patient denies using Tramadol   -[place] record was obtained and reviewed  for the last one year and no indicators of drug misuse  were found. Any other controlled substance prescriptions  seen on the record have been accounted for, I am aware of the patient receiving these medications. Michaelle Diallo  [place] record will be rechecked as part of office protocol.    -Discharge protocol initiated. Patient was offered ongoing care with adjuvant medications and neuromodulators to control pain but with no opioids or benzodiazepines. Patient was given a list of pain management physicians. If indicated patient will be given a 30 day supply of medications and was told that I will continue to treat She for next 30 days, after which She will have to follow up with another physician. Patient was again educated about narcotic misuse/abuse and risks of abuse/misuse including death were discussed with patient. Referral to an addictionologist was also discussed with the patient. She was also asked to taper off Her  medications if unable to follow up with another physician in the next 30 days.   -Will continue to follow up with me, no opioids after this month  -Discontinue Emgality and start Aimovig 140 mcg S/C  -she was advised  to avoid using too many OTC analgesics to control the headaches, avoid chocolates, increased caffeine, cheeses and MSG nitrite containing foods, cigarette smoking. To avoid bright lights, strong smells and skipping meals.   -She was advised to increase fluids ( 5-7  glasses of fluid daily), limit caffeine, avoid cheese products, increase dietary fiber, increase activity and exercise as tolerated and relax regularly and enjoy meals   -she was advised proper sleep hygiene-told to avoid:use of caffeine or other stimulants after noon, alcohol use near bedtime, long or frequent naps during the day, erratic sleep schedule, heavy meals near bedtime, vigorous exercise near bedtime and use of electronic devices near bedtime   -Continue with Seroquel   -CBT techniques- relaxation therapies such as biofeedback, mindfulness based stress reduction, imagery, cognitive restructuring, problem solving discussed with patient   -Continue with Qiana   Ms. Chelita Sage will be prescribed  the medications  listed below which are for treatment of her presenting  medical problems which for this visit include:   Diagnoses of Chronic pain syndrome, Fibromyalgia, DDD (degenerative disc disease), lumbar, Tendinitis of right rotator cuff, DDD (degenerative disc disease), cervical, Primary insomnia, Gastro-esophageal reflux disease without esophagitis, Intractable migraine with aura without status migrainosus, Rotator cuff tendonitis, right, Chronic painful diabetic neuropathy ([address] and Moderate episode of recurrent major depressive disorder ([facility] [address] were pertinent to this visit. Medications/orders associated with this visit:    Current Outpatient Medications   Medication Sig Dispense Refill    Continuous Blood Gluc  (FREESTYLE LISSETT 14 DAY READER) DAYO 1 each by Does not apply route 3 times daily 1 each 1    Continuous Blood Gluc Sensor (FREESTYLE LISSETT 14 DAY SENSOR) MISC 1 each by Does not apply route every 14 days 2 each 5    oxyCODONE-acetaminophen (PERCOCET) 7.5-325 MG per tablet Take 1 tablet by mouth every 6 hours as needed for Pain (max 3-4 per day) for up to 28 days. 100 tablet 0    DULoxetine (CYMBALTA) 60 MG extended release capsule Take 1 capsule by mouth daily 30 capsule 1    QUEtiapine (SEROQUEL) 25 MG tablet Take 1-2 tablets by mouth nightly 60 tablet 1    tiZANidine (ZANAFLEX) 4 MG tablet Take 0.5-1 tablets by mouth 2 times daily as needed (muscle spasms) 60 tablet 1    montelukast (SINGULAIR) 10 MG tablet TAKE 1 TABLET BY MOUTH DAILY 30 tablet 1    omeprazole (PRILOSEC) 20 MG delayed release capsule TAKE 1 CAPSULE BY MOUTH DAILY 30 capsule 1    amLODIPine (NORVASC) 10 MG tablet Take 1 tablet by mouth in the morning.  90 tablet 1    metoprolol succinate (TOPROL XL) 50 MG extended release tablet Take 1 tablet by mouth nightly 90 tablet 5    Ubrogepant (UBRELVY) 50 MG TABS Take 1 tablet by mouth daily Take 1 tablet po PRN at start of headaches, can repeat in 24 hours 30 tablet 1    apixaban (ELIQUIS) 2.5 MG TABS tablet Take 1 tablet by mouth in the morning and 1 tablet before bedtime. 180 tablet 1    Continuous Blood Gluc Sensor (DEXCOM G6 SENSOR) MISC 1 each by Does not apply route every 14 days 3 each 1    buPROPion (ZYBAN) 150 MG extended release tablet Take 1 tablet by mouth 2 times daily 60 tablet 5    clotrimazole-betamethasone (LOTRISONE) 1-0.05 % cream Apply topically 2 times daily.  5 g 5    Continuous Blood Gluc  (DEXCOM G6 ) DAYO Change sensor every 10 days 1 each 0    albuterol sulfate  (90 Base) MCG/ACT inhaler INHALE 2 PUFFS INTO THE LUNGS EVERY 4 HOURS AS NEEDED FOR WHEEZING OR SHORTNESS OF BREATH 54 g 5    ASPIRIN LOW DOSE 81 MG EC tablet TAKE 1 TABLET BY MOUTH DAILY 90 tablet 5    Continuous Blood Gluc Transmit (DEXCOM G6 TRANSMITTER) MISC 1 each by Does not apply route daily 1 each 2    Continuous Blood Gluc  (DEXCOM G6 ) DAYO 1 each by Does not apply route daily 1 each 1    albuterol (PROVENTIL) (2.5 MG/3ML) 0.083% nebulizer solution TAKE 3 MLS BY NEBULIZATION EVERY 4 HOURS AS NEEDED FOR WHEEZING 360 mL 5    ULTICARE MINI PEN NEEDLES 31G X 6 MM MISC USE WITH INSULINS FOUR TIMES A DAY 90 each 1    insulin glargine (BASAGLAR KWIKPEN) 100 UNIT/ML injection pen Inject 8 Units into the skin 2 times daily 5 pen 3    isosorbide mononitrate (IMDUR) 60 MG extended release tablet Take 1 tablet by mouth daily 90 tablet 5    nitroGLYCERIN (NITRODUR) 0.1 MG/HR Place 1 patch onto the skin every 24 hours 30 patch 0    docusate sodium (COLACE) 100 MG capsule Take 100 mg by mouth 2 times daily      atorvastatin (LIPITOR) 80 MG tablet TAKE 1 TABLET BY MOUTH NIGHTLY 90 tablet 5    ranolazine (RANEXA) 1000 MG extended release tablet Take 1 tablet by mouth 2 times daily 60 tablet 8    linaclotide (LINZESS) 145 MCG capsule Take 1 capsule by mouth every morning (before breakfast) 30 capsule 5    nitroGLYCERIN (NITROSTAT) 0.4 MG SL tablet Place 1 tablet under the tongue every 5 minutes as needed for Chest pain up to max of 3 develops new symptoms or if the symptoms worsen, she was told to call the office. .  Thank you for allowing me to participate in the care of this patient.     Fang Manjarrez MD    Cc: Ace Seo MD

## 2022-09-21 RX ORDER — BLOOD-GLUCOSE METER
1 KIT MISCELLANEOUS DAILY PRN
Qty: 1 EACH | Refills: 0 | Status: SHIPPED | OUTPATIENT
Start: 2022-09-21

## 2022-09-28 ENCOUNTER — PATIENT MESSAGE (OUTPATIENT)
Dept: PRIMARY CARE CLINIC | Age: 67
End: 2022-09-28

## 2022-09-28 DIAGNOSIS — J44.0 COPD WITH ACUTE BRONCHITIS (HCC): ICD-10-CM

## 2022-09-28 DIAGNOSIS — J20.9 COPD WITH ACUTE BRONCHITIS (HCC): ICD-10-CM

## 2022-09-28 RX ORDER — ALBUTEROL SULFATE 90 UG/1
2 AEROSOL, METERED RESPIRATORY (INHALATION) EVERY 4 HOURS PRN
Qty: 54 G | Refills: 5 | Status: SHIPPED | OUTPATIENT
Start: 2022-09-28

## 2022-09-28 RX ORDER — METHYLPREDNISOLONE 4 MG/1
TABLET ORAL
Qty: 21 TABLET | Refills: 0 | Status: SHIPPED | OUTPATIENT
Start: 2022-09-28 | End: 2022-10-04

## 2022-09-28 RX ORDER — FLUTICASONE PROPIONATE AND SALMETEROL 500; 50 UG/1; UG/1
1 POWDER RESPIRATORY (INHALATION) EVERY 12 HOURS
Qty: 60 EACH | Refills: 3 | Status: SHIPPED | OUTPATIENT
Start: 2022-09-28

## 2022-09-28 RX ORDER — AZITHROMYCIN 250 MG/1
250 TABLET, FILM COATED ORAL SEE ADMIN INSTRUCTIONS
Qty: 6 TABLET | Refills: 0 | Status: SHIPPED | OUTPATIENT
Start: 2022-09-28 | End: 2022-10-03

## 2022-09-28 NOTE — TELEPHONE ENCOUNTER
Shila Kennedy MD  You 1 hour ago (10:53 AM)     [facility]  Meds called in + inhalers   Stop Smoking!       Msg sent to pt'

## 2022-10-09 ENCOUNTER — APPOINTMENT (OUTPATIENT)
Dept: GENERAL RADIOLOGY | Age: 67
End: 2022-10-09
Payer: COMMERCIAL

## 2022-10-09 ENCOUNTER — HOSPITAL ENCOUNTER (EMERGENCY)
Age: 67
Discharge: HOME | End: 2022-10-09
Attending: EMERGENCY MEDICINE
Payer: COMMERCIAL

## 2022-10-09 VITALS
HEART RATE: 56 BPM | DIASTOLIC BLOOD PRESSURE: 130 MMHG | WEIGHT: 128.53 LBS | OXYGEN SATURATION: 100 % | RESPIRATION RATE: 15 BRPM | SYSTOLIC BLOOD PRESSURE: 174 MMHG | TEMPERATURE: 97.8 F | HEIGHT: 60 IN | BODY MASS INDEX: 25.23 KG/M2

## 2022-10-09 DIAGNOSIS — R07.9 CHEST PAIN, UNSPECIFIED TYPE: ICD-10-CM

## 2022-10-09 DIAGNOSIS — I10 HYPERTENSION, UNSPECIFIED TYPE: Primary | ICD-10-CM

## 2022-10-09 LAB
A/G RATIO: 1 (ref 1.1–2.2)
ALBUMIN SERPL-MCNC: 4.1 G/DL (ref 3.4–5)
ALP BLD-CCNC: 228 U/L (ref 40–129)
ALT SERPL-CCNC: 18 U/L (ref 10–40)
ANION GAP SERPL CALCULATED.3IONS-SCNC: 14 MMOL/L (ref 3–16)
AST SERPL-CCNC: 26 U/L (ref 15–37)
BASOPHILS ABSOLUTE: 0 K/UL (ref 0–0.2)
BASOPHILS RELATIVE PERCENT: 0.9 %
BILIRUB SERPL-MCNC: <0.2 MG/DL (ref 0–1)
BUN BLDV-MCNC: 26 MG/DL (ref 7–20)
CALCIUM SERPL-MCNC: 9.3 MG/DL (ref 8.3–10.6)
CHLORIDE BLD-SCNC: 101 MMOL/L (ref 99–110)
CO2: 21 MMOL/L (ref 21–32)
CREAT SERPL-MCNC: 1.4 MG/DL (ref 0.6–1.2)
EOSINOPHILS ABSOLUTE: 0.2 K/UL (ref 0–0.6)
EOSINOPHILS RELATIVE PERCENT: 5.5 %
GFR AFRICAN AMERICAN: 46
GFR NON-AFRICAN AMERICAN: 38
GLUCOSE BLD-MCNC: 203 MG/DL (ref 70–99)
HCT VFR BLD CALC: 34.2 % (ref 36–48)
HEMOGLOBIN: 11.2 G/DL (ref 12–16)
LYMPHOCYTES ABSOLUTE: 1.4 K/UL (ref 1–5.1)
LYMPHOCYTES RELATIVE PERCENT: 35.5 %
MCH RBC QN AUTO: 30.3 PG (ref 26–34)
MCHC RBC AUTO-ENTMCNC: 32.8 G/DL (ref 31–36)
MCV RBC AUTO: 92.3 FL (ref 80–100)
MONOCYTES ABSOLUTE: 0.3 K/UL (ref 0–1.3)
MONOCYTES RELATIVE PERCENT: 6.9 %
NEUTROPHILS ABSOLUTE: 2.1 K/UL (ref 1.7–7.7)
NEUTROPHILS RELATIVE PERCENT: 51.2 %
PDW BLD-RTO: 15.3 % (ref 12.4–15.4)
PLATELET # BLD: 226 K/UL (ref 135–450)
PMV BLD AUTO: 7.4 FL (ref 5–10.5)
POTASSIUM SERPL-SCNC: 4.1 MMOL/L (ref 3.5–5.1)
PRO-BNP: 1067 PG/ML (ref 0–124)
RBC # BLD: 3.7 M/UL (ref 4–5.2)
SODIUM BLD-SCNC: 136 MMOL/L (ref 136–145)
TOTAL PROTEIN: 8.3 G/DL (ref 6.4–8.2)
TROPONIN: <0.01 NG/ML
TROPONIN: <0.01 NG/ML
WBC # BLD: 4.1 K/UL (ref 4–11)

## 2022-10-09 PROCEDURE — 6370000000 HC RX 637 (ALT 250 FOR IP): Performed by: PHYSICIAN ASSISTANT

## 2022-10-09 PROCEDURE — 99285 EMERGENCY DEPT VISIT HI MDM: CPT

## 2022-10-09 PROCEDURE — 84484 ASSAY OF TROPONIN QUANT: CPT

## 2022-10-09 PROCEDURE — 71046 X-RAY EXAM CHEST 2 VIEWS: CPT

## 2022-10-09 PROCEDURE — 85025 COMPLETE CBC W/AUTO DIFF WBC: CPT

## 2022-10-09 PROCEDURE — 83880 ASSAY OF NATRIURETIC PEPTIDE: CPT

## 2022-10-09 PROCEDURE — 80053 COMPREHEN METABOLIC PANEL: CPT

## 2022-10-09 PROCEDURE — 93005 ELECTROCARDIOGRAM TRACING: CPT | Performed by: PHYSICIAN ASSISTANT

## 2022-10-09 RX ORDER — AMLODIPINE BESYLATE 5 MG/1
10 TABLET ORAL ONCE
Status: COMPLETED | OUTPATIENT
Start: 2022-10-09 | End: 2022-10-09

## 2022-10-09 RX ORDER — NITROGLYCERIN 0.4 MG/1
0.4 TABLET SUBLINGUAL EVERY 5 MIN PRN
Status: DISCONTINUED | OUTPATIENT
Start: 2022-10-09 | End: 2022-10-09 | Stop reason: HOSPADM

## 2022-10-09 RX ORDER — OXYCODONE HYDROCHLORIDE AND ACETAMINOPHEN 5; 325 MG/1; MG/1
2 TABLET ORAL ONCE
Status: COMPLETED | OUTPATIENT
Start: 2022-10-09 | End: 2022-10-09

## 2022-10-09 RX ORDER — METOPROLOL SUCCINATE 50 MG/1
50 TABLET, EXTENDED RELEASE ORAL ONCE
Status: COMPLETED | OUTPATIENT
Start: 2022-10-09 | End: 2022-10-09

## 2022-10-09 RX ORDER — ONDANSETRON 4 MG/1
4 TABLET, ORALLY DISINTEGRATING ORAL ONCE
Status: COMPLETED | OUTPATIENT
Start: 2022-10-09 | End: 2022-10-09

## 2022-10-09 RX ADMIN — METOPROLOL SUCCINATE 50 MG: 50 TABLET, EXTENDED RELEASE ORAL at 07:56

## 2022-10-09 RX ADMIN — NITROGLYCERIN 0.4 MG: 0.4 TABLET, ORALLY DISINTEGRATING SUBLINGUAL at 06:58

## 2022-10-09 RX ADMIN — AMLODIPINE BESYLATE 10 MG: 5 TABLET ORAL at 07:56

## 2022-10-09 RX ADMIN — OXYCODONE HYDROCHLORIDE AND ACETAMINOPHEN 2 TABLET: 5; 325 TABLET ORAL at 08:22

## 2022-10-09 RX ADMIN — ONDANSETRON 4 MG: 4 TABLET, ORALLY DISINTEGRATING ORAL at 06:58

## 2022-10-09 ASSESSMENT — PAIN SCALES - GENERAL
PAINLEVEL_OUTOF10: 8
PAINLEVEL_OUTOF10: 8
PAINLEVEL_OUTOF10: 10
PAINLEVEL_OUTOF10: 8

## 2022-10-09 ASSESSMENT — PAIN DESCRIPTION - LOCATION
LOCATION: CHEST

## 2022-10-09 ASSESSMENT — PAIN - FUNCTIONAL ASSESSMENT: PAIN_FUNCTIONAL_ASSESSMENT: 0-10

## 2022-10-09 NOTE — ED PROVIDER NOTES
[address] [facility]      Pt Name: Josh Bolton  MRN: [id number]  [place] 1955  Date of evaluation: 10/9/2022  Provider: BILLY Cobian       I have seen and evaluated this patient with my supervising physician Dr. Nadia Andrade     Chest pain      HISTORY OF PRESENT ILLNESS  (Location/Symptom, Timing/Onset, Context/Setting, Quality, Duration, Modifying Factors, Severity.)   Josh Bolton is a 73 y.o. female who presents to the emergency department for chest pain. Patient was here visiting her fiancé who was admitted as a patient on the fourth floor when she woke up at 430 or 5 AM with a left-sided chest pain. Its a squeezing type of pain rated 8/10. Radiates to her shoulder and her arm. Has no alleviating or aggravating factors. If she would have been at home, she reports she would have taken her nitroglycerin. She did not have it with her. She is also not taking her blood pressure medication since Friday night. She has nausea but no vomiting abdominal pain. Denies fevers chills or shortness of breath. She has a history of A. fib and is on Eliquis. She has a history of CHF, COPD, hyperlipidemia, hypertension, diabetes, CKD, CAD status post stent, CVA. She quit smoking 15 years ago. Has a positive family history of MI. Has a personal history of MI. Reports she did stress test 1 month ago that was okay. Nursing Notes were reviewed and I agree.     REVIEW OF SYSTEMS    (2-9 systems for level 4, 10 or more for level 5)     Review of Systems    All other systems reviewed and are negative except as noted    PAST MEDICAL HISTORY         Diagnosis Date    Acid reflux     Anemia     Anxiety and depression     Arthritis     Asthma     Atrial fibrillation (HCC)     CAD (coronary artery disease) 12/3/2012    Cerebral artery occlusion with cerebral infarction [facility])     TIA\"\"S--right sided weakness & headache    CHF (congestive heart failure) (HCC)     Chronic kidney disease--stage III     40% kidney function    COPD (chronic obstructive pulmonary disease) (HCC)     DM2 (diabetes mellitus, type 2) ([facility] [address]     Dysarthria     Fibromyalgia 6/7/2016    Headache(784.0) 2/19/2013    Hemisensory loss     History of blood transfusion 11/2020    pt denies having transfusion reaction    Hyperlipidemia     Hypertension     IBS (irritable bowel syndrome)     Inferior vena cava occlusion (HCC)     Keratitis     Meningioma (HCC)     MI, old     Neuropathy     Superior vena cava obstruction     Temporal arteritis (HCC) 2/24/2014    Wears glasses        SURGICAL HISTORY           Procedure Laterality Date    ABLATION OF DYSRHYTHMIC FOCUS  1999  and 11/2020    times 2    ARTERY BIOPSY Right 04/23/2014    RIGHT TEMPORAL ARTERY BIOPSY    CAROTID ARTERY SURGERY Left     clean up per pt    CATARACT REMOVAL Bilateral     CHOLECYSTECTOMY      COLONOSCOPY N/A 4/9/2021    COLONOSCOPY WITH BIOPSY performed by Joel Sarabia MD at [address] [facility] N/A 10/15/2021    COLONOSCOPY performed by Joel Sarabia MD at [address] [place]    HYSTERECTOMY (CERVIX STATUS UNKNOWN)      JOINT REPLACEMENT Right     KNEE ARTHROSCOPY Right     PTCA  10/2019    LAD and RCA inrtervention    TUNNELED VENOUS PORT PLACEMENT      left thigh.   SMART PORT-----Removed--total of 4 port placement and removal    UPPER GASTROINTESTINAL ENDOSCOPY N/A 7/6/2020    EGD DIAGNOSTIC ONLY performed by Amanuel Montague MD at [address] [facility]       Discharge Medication List as of 10/9/2022 10:51 AM        CONTINUE these medications which have NOT CHANGED    Details   albuterol sulfate HFA (PROVENTIL;VENTOLIN;PROAIR) 108 (90 Base) MCG/ACT inhaler Inhale 2 puffs into the lungs every 4 hours as needed for Wheezing or Shortness of Breath, Disp-54 g, R-5Normal      fluticasone-salmeterol (ADVAIR) 500-50 MCG/ACT AEPB diskus inhaler Inhale 1 puff into the lungs in the morning and 1 puff in the evening., Disp-60 each, R-3Normal      Blood Glucose Monitoring Suppl (FREESTYLE LITE) DAYO DAILY PRN Starting Wed 9/21/2022, Disp-1 each, R-0, Normal      oxyCODONE-acetaminophen (PERCOCET) 7.5-325 MG per tablet Take 1 tablet by mouth every 6 hours as needed for Pain (max 3-4 per day) for up to 28 days. , Disp-100 tablet, R-0Print      DULoxetine (CYMBALTA) 60 MG extended release capsule Take 1 capsule by mouth daily, Disp-30 capsule, R-1Normal      QUEtiapine (SEROQUEL) 25 MG tablet Take 1-2 tablets by mouth nightly, Disp-60 tablet, R-1Normal      tiZANidine (ZANAFLEX) 4 MG tablet Take 0.5-1 tablets by mouth 2 times daily as needed (muscle spasms), Disp-60 tablet, R-1Normal      Erenumab-aooe (AIMOVIG) 140 MG/ML SOAJ Inject 140 mg into the skin every 30 days, Disp-1 Adjustable Dose Pre-filled Pen Syringe, R-0Normal      !! Continuous Blood Gluc  (FREESTYLE LISSETT 14 DAY READER) DAYO 1 each by Does not apply route 3 times daily, Disp-1 each, R-1Normal      !! Continuous Blood Gluc Sensor (FREESTYLE LISSETT 14 DAY SENSOR) MISC 1 each by Does not apply route every 14 days, Disp-2 each, R-5Normal      montelukast (SINGULAIR) 10 MG tablet TAKE 1 TABLET BY MOUTH DAILY, Disp-30 tablet, R-1Normal      omeprazole (PRILOSEC) 20 MG delayed release capsule TAKE 1 CAPSULE BY MOUTH DAILY, Disp-30 capsule, R-1Normal      amLODIPine (NORVASC) 10 MG tablet Take 1 tablet by mouth in the morning., Disp-90 tablet, R-1Normal      metoprolol succinate (TOPROL XL) 50 MG extended release tablet Take 1 tablet by mouth nightly, Disp-90 tablet, R-5Normal      Ubrogepant (UBRELVY) 50 MG TABS Take 1 tablet by mouth daily Take 1 tablet po PRN at start of headaches, can repeat in 24 hours, Disp-30 tablet, R-1Normal      apixaban (ELIQUIS) 2.5 MG TABS tablet Take 1 tablet by mouth in the morning and 1 tablet before bedtime. , Disp-180 tablet, R-1Normal      !! Continuous Blood Gluc Sensor (DEXCOM G6 SENSOR) MISC 1 each by Does not apply route every 14 days, Disp-3 each, R-1Normal      buPROPion (ZYBAN) 150 MG extended release tablet Take 1 tablet by mouth 2 times daily, Disp-60 tablet, R-5Normal      clotrimazole-betamethasone (LOTRISONE) 1-0.05 % cream Apply topically 2 times daily. , Disp-5 g, R-5, Normal      !! Continuous Blood Gluc  (DEXCOM G6 ) DAYO Change sensor every 10 days, Disp-1 each, R-01 kit Lot [id number] Exp 6/24/2022Sample      ASPIRIN LOW DOSE 81 MG EC tablet TAKE 1 TABLET BY MOUTH DAILY, Disp-90 tablet, R-5Normal      Continuous Blood Gluc Transmit (DEXCOM G6 TRANSMITTER) MISC 1 each by Does not apply route daily, Disp-1 each, R-2Normal      !!  Continuous Blood Gluc  ([address]) DAYO 1 each by Does not apply route daily, Disp-1 each, R-1Normal      albuterol (PROVENTIL) (2.5 MG/3ML) 0.083% nebulizer solution TAKE 3 MLS BY NEBULIZATION EVERY 4 HOURS AS NEEDED FOR WHEEZING, Disp-360 mL, R-5Normal      ULTICARE MINI PEN NEEDLES 31G X 6 MM MISC Disp-90 each, R-1, Normal      insulin glargine (BASAGLAR KWIKPEN) 100 UNIT/ML injection pen Inject 8 Units into the skin 2 times daily, Disp-5 pen, R-3Normal      isosorbide mononitrate (IMDUR) 60 MG extended release tablet Take 1 tablet by mouth daily, Disp-90 tablet, R-5Normal      nitroGLYCERIN (NITRODUR) 0.1 MG/HR Place 1 patch onto the skin every 24 hours, Disp-30 patch, R-0Normal      docusate sodium (COLACE) 100 MG capsule Take 100 mg by mouth 2 times dailyHistorical Med      atorvastatin (LIPITOR) 80 MG tablet TAKE 1 TABLET BY MOUTH NIGHTLY, Disp-90 tablet, R-5Normal      ranolazine (RANEXA) 1000 MG extended release tablet Take 1 tablet by mouth 2 times daily, Disp-60 tablet, R-8Normal      linaclotide (LINZESS) 145 MCG capsule Take 1 capsule by mouth every morning (before breakfast), Disp-30 capsule, R-5Normal      nitroGLYCERIN (NITROSTAT) 0.4 MG SL tablet Place 1 tablet under the tongue every 5 minutes as needed for Chest pain up to max of 3 total doses. If no relief after 1 dose, call 911., Disp-25 tablet, R-3Normal       !! - Potential duplicate medications found. Please discuss with provider. ALLERGIES     Patient has no known allergies. FAMILY HISTORY           Problem Relation Age of Onset    Diabetes Mother     High Cholesterol Mother     Stroke Mother     Cancer Mother     High Blood Pressure Mother     No Known Problems Paternal Grandfather         lung issues      Family Status   Relation Name Status    Mother      Father      PGF  (Not Specified)        SOCIAL HISTORY      reports that she quit smoking about 4 years ago. Her smoking use included cigarettes. She has a 17.50 pack-year smoking history. She has never used smokeless tobacco. She reports that she does not drink alcohol and does not use drugs. PHYSICAL EXAM    (up to 7 for level 4, 8 or more for level 5)     ED Triage Vitals   BP Temp Temp src Pulse Resp SpO2 Height Weight   -- -- -- -- -- -- -- --       Physical Exam  Constitutional:       General: She is not in acute distress. Appearance: Normal appearance. She is well-developed. She is not ill-appearing, toxic-appearing or diaphoretic. HENT:      Head: Normocephalic and atraumatic. Cardiovascular:      Rate and Rhythm: Normal rate and regular rhythm. Heart sounds: Normal heart sounds. Pulmonary:      Effort: Pulmonary effort is normal. No respiratory distress. Breath sounds: Normal breath sounds. Abdominal:      General: There is no distension. Palpations: Abdomen is soft. There is no mass. Tenderness: There is no abdominal tenderness. There is no guarding or rebound. Hernia: No hernia is present. Musculoskeletal:         General: Normal range of motion. Cervical back: Normal range of motion and neck supple. Right lower leg: No edema. Left lower leg: No edema.       Comments: No calf tenderness bilaterally   Skin:     General: Skin is warm. Neurological:      Mental Status: She is alert. Psychiatric:         Mood and Affect: Mood normal.         Behavior: Behavior normal.         Thought Content: Thought content normal.         Judgment: Judgment normal.       DIFFERENTIAL DIAGNOSIS   Acute Myocardial Infarction, Acute Coronary Syndrome, Pneumothorax, Aortic Dissection, Pulmonary Embolism, Pneumonia      DIAGNOSTICRESULTS     EKG: All EKG's are interpreted by BILLY Arredondo in the absence of a cardiologist.    EKG obtained. See Dr. Wendy Patel note for interpretation. RADIOLOGY:   Non-plain film images such as CT, Ultrasound and MRI are read by the radiologist. Plain radiographic images are visualized and preliminarily interpreted by BILLY Arredondo with the below findings:      Interpretation per the Radiologist below, if available at the time of this note:    XR CHEST (2 VW)   Final Result   No acute cardiopulmonary disease.                LABS:  Results for orders placed or performed during the hospital encounter of 10/09/22   Brain Natriuretic Peptide   Result Value Ref Range    Pro-BNP 1,067 (H) 0 - 124 pg/mL   CBC with Auto Differential   Result Value Ref Range    WBC 4.1 4.0 - 11.0 K/uL    RBC 3.70 (L) 4.00 - 5.20 M/uL    Hemoglobin 11.2 (L) 12.0 - 16.0 g/dL    Hematocrit 34.2 (L) 36.0 - 48.0 %    MCV 92.3 80.0 - 100.0 fL    MCH 30.3 26.0 - 34.0 pg    MCHC 32.8 31.0 - 36.0 g/dL    RDW 15.3 12.4 - 15.4 %    Platelets 997 622 - 951 K/uL    MPV 7.4 5.0 - 10.5 fL    Neutrophils % 51.2 %    Lymphocytes % 35.5 %    Monocytes % 6.9 %    Eosinophils % 5.5 %    Basophils % 0.9 %    Neutrophils Absolute 2.1 1.7 - 7.7 K/uL    Lymphocytes Absolute 1.4 1.0 - 5.1 K/uL    Monocytes Absolute 0.3 0.0 - 1.3 K/uL    Eosinophils Absolute 0.2 0.0 - 0.6 K/uL    Basophils Absolute 0.0 0.0 - 0.2 K/uL   Comprehensive Metabolic Panel   Result Value Ref Range    Sodium 136 136 - 145 mmol/L Potassium 4.1 3.5 - 5.1 mmol/L    Chloride 101 99 - 110 mmol/L    CO2 21 21 - 32 mmol/L    Anion Gap 14 3 - 16    Glucose 203 (H) 70 - 99 mg/dL    BUN 26 (H) 7 - 20 mg/dL    Creatinine 1.4 (H) 0.6 - 1.2 mg/dL    GFR Non- 38 (A) >60    GFR  46 (A) >60    Calcium 9.3 8.3 - 10.6 mg/dL    Total Protein 8.3 (H) 6.4 - 8.2 g/dL    Albumin 4.1 3.4 - 5.0 g/dL    Albumin/Globulin Ratio 1.0 (L) 1.1 - 2.2    Total Bilirubin <0.2 0.0 - 1.0 mg/dL    Alkaline Phosphatase 228 (H) 40 - 129 U/L    ALT 18 10 - 40 U/L    AST 26 15 - 37 U/L   Troponin   Result Value Ref Range    Troponin <0.01 <0.01 ng/mL   Troponin   Result Value Ref Range    Troponin <0.01 <0.01 ng/mL       All other labs were withinnormal range or not returned as of this dictation. EMERGENCY DEPARTMENT COURSE and DIFFERENTIAL DIAGNOSIS/MDM:   Vitals:    Vitals:    10/09/22 0900 10/09/22 0930 10/09/22 1000 10/09/22 1030   BP: (!) 198/75 (!) 198/72 (!) 162/130 (!) 174/130   Pulse: 66 63 57 56   Resp: 16 13 15 15   Temp:    97.8 °F (36.6 °C)   TempSrc:    Oral   SpO2: 100% 100% 100% 100%   Weight:       Height:           Patient was nontoxic, well appearing, afebrile with normal vital signs with exception of significant hypertension. She has not taken her home meds since Friday evening. Given doses here. Saturating well on room air. Reviewed discharge summary from admission 8/27/2022 - 8/30/2022 for chest pain. Stress test was negative and is below:  Stress Test 8/29/22:      Summary    Non-diagnostic ECG d/t inadequate achieved HF (Pharmacologic study)    Normal myocardial perfusion study. Normal LV size and systolic function. Overall findings represent a low risk scan. She was given nitroglycerin which did not help her pain for the first dose but did help a little bit for the second dose. Her troponin is negative. She continues to ask for something for pain. Reports she is usually given morphine.       On review of chart, she was recently dismissed from her pain management doctor. Repeat trop negative. She had a negative stress test 1 month ago. Low suspicion for ACS, dissection, PE. Do not think admission indicated. Instructed to follow-up with her cardiologist for reevaluation return for worsening. She agreed and understood. Is this patient to be included in the SEP-1 Core Measure due to severe sepsis or septic shock? No   Exclusion criteria - the patient is NOT to be included for SEP-1 Core Measure due to: Infection is not suspected           PROCEDURES:  None    FINAL IMPRESSION      1. Hypertension, unspecified type    2.  Chest pain, unspecified type          DISPOSITION/PLAN   DISPOSITION Decision To Discharge 10/09/2022 10:30:25 AM      PATIENT REFERRED TO:  Rowan Carson MD  [address] [place] [address] [place] [address]  [phone]    Schedule an appointment as soon as possible for a visit   for reevaluation and to get your blood pressure rechecked    [facility] Emergency Department  [address]  [phone]    As needed, If symptoms worsen    DISCHARGE MEDICATIONS:  Discharge Medication List as of 10/9/2022 10:51 AM          (Please note that portions of this note werecompleted with a voice recognition program.  Efforts were made to edit the dictations but occasionally words are mis-transcribed.)    Lila Fowler, [address] [place] [address]  10/09/22 0734

## 2022-10-09 NOTE — ED PROVIDER NOTES
Attending Supervisory Note/Shared Visit   I have personally performed a face to face diagnostic evaluation on this patient. I have reviewed the mid-levels findings and agree. History and Exam by me shows an alert black female in no acute distress. Patient was actually here at the hospital with her significant other. She woke up at 4:30 AM with left-sided chest pain. She feels little more short of breath than usual.  She is nauseated. Patient has history of coronary artery disease and hypertension. Since she was here at the hospital she did not take her blood pressure medications yesterday. General: Alert thin black female no acute distress. HEENT: Atraumatic. Pupils equal round reactive. No pallor of the conjunctiva. Oropharynx negative. Neck: Supple, nontender, no adenopathy. No JVD. Heart: Regular rate and rhythm. No murmurs or gallops noted. Lungs: Breath sounds equal bilaterally and clear. Abdomen: Soft, nondistended, nontender. Musculoskeletal: No lower extremity edema. No calf tenderness. EKG: Sinus rhythm, rate of 63, short SD interval, age-indeterminate septal infarct. Rhythm strip shows a sinus rhythm with rate of 63, SD interval of 88 ms, QRS of 88 ms with no other ectopy as interpreted by me. Compared to EKG dated 8/27/22 the previously noted nonspecific T wave flattening in the inferior and lateral precordial leads has resolved. No other significant changes noted. Reviewed. H&H of 11.2 and 34.2. White blood cell count 4100 with 51 neutrophils and 36 lymphs. Electrolytes are normal.  BUN of 26 with a creatinine of 1.4. Glucose of 203. Liver enzymes are normal.  BNP of 1067. Old records reviewed. Patient had a nuclear stress test on on 8/28/2022. Read as a normal myocardial perfusion study. Overall findings represent a low risk scan. Repeat troponin less than 0.01. This patient's EKG shows no acute abnormalities.   Her initial troponin and repeat troponin are unremarkable. She had a nuclear stress test about 6 weeks ago. Her blood pressure is elevated here. She has not taken her blood pressure medications. Systolic blood pressures improved. Her pains improved. I have a low index of suspicion that her pain is cardiac in origin. I think she can be discharged for follow-up with her cardiologist.  She needs to take her medications as prescribed. Return as needed for worsening of symptoms or new symptoms of concern.       (Please note that portions of this note were completed with a voice recognition program.  Efforts were made to edit the dictations but occasionally words are mis-transcribed.)    Johann Munguia MD  Attending Emergency Physician        Peyton Pascal MD  10/09/22 4148

## 2022-10-10 DIAGNOSIS — I10 ESSENTIAL HYPERTENSION: ICD-10-CM

## 2022-10-10 LAB
EKG ATRIAL RATE: 63 BPM
EKG DIAGNOSIS: NORMAL
EKG P AXIS: -21 DEGREES
EKG P-R INTERVAL: 88 MS
EKG Q-T INTERVAL: 440 MS
EKG QRS DURATION: 88 MS
EKG QTC CALCULATION (BAZETT): 450 MS
EKG R AXIS: 44 DEGREES
EKG T AXIS: 86 DEGREES
EKG VENTRICULAR RATE: 63 BPM

## 2022-10-10 PROCEDURE — 93010 ELECTROCARDIOGRAM REPORT: CPT | Performed by: INTERNAL MEDICINE

## 2022-10-10 RX ORDER — LABETALOL 200 MG/1
200 TABLET, FILM COATED ORAL 2 TIMES DAILY
Qty: 60 TABLET | Refills: 3 | Status: SHIPPED | OUTPATIENT
Start: 2022-10-10

## 2022-10-10 NOTE — TELEPHONE ENCOUNTER
General Question     Subject: PATIENT REQUESTING A REFERRAL FOR HOME THERAPY DUE TO THE COVID CASES RISING. SHE WOULD LIKE TO DISCUSS OPTIONS TO RELIEVE PAIN BEFORE 1/12 APPOINTMENT. PLEASE ADVISE.    Patient: Julieth Owens
PATIENT STATED SHE CALL AND SHE IS HAVING SOME PROBLEMS WITH HER PHONE AND SHE STATED SHE DID NOT RECEIVED THE VM BUT SHE JUST  NEED TO HAVE SOMEONE GIVE HER A CALL ABOUT THE HOME THERAPY. SHE STATED SHE WILL BE WAITING BY THE PHONE FOR THE CALL.
1.43

## 2022-10-10 NOTE — TELEPHONE ENCOUNTER
Medication:   Requested Prescriptions     Pending Prescriptions Disp Refills    labetalol (NORMODYNE) 200 MG tablet [Pharmacy Med Name: LABETALOL  MG TABS 200 Tablet] 60 tablet 3     Sig: TAKE 1 TABLET BY MOUTH 2 TIMES DAILY        Last Filled:      Patient Phone Number: [phone] (home)     Last appt: 8/4/2022   Next appt: Visit date not found    Last OARRS:   RX Monitoring 4/9/2019   Attestation The Prescription Monitoring Report for this patient was reviewed today. Medication:   Requested Prescriptions     Pending Prescriptions Disp Refills    labetalol (NORMODYNE) 200 MG tablet [Pharmacy Med Name: LABETALOL  MG TABS 200 Tablet] 60 tablet 3     Sig: TAKE 1 TABLET BY MOUTH 2 TIMES DAILY        Last Filled:      Patient Phone Number: [phone] (home)     Last appt: 8/4/2022   Next appt: Visit date not found    Last OARRS:   RX Monitoring 4/9/2019   Attestation The Prescription Monitoring Report for this patient was reviewed today.

## 2022-10-12 NOTE — TELEPHONE ENCOUNTER
Letter done given to [facility]
Please advise
Pt needs a letter stating that she is going through depression. Pt is not allowed to have visitors stay with her due to Covid unless she has a medical reason. Pt would like someone to stay with her for a couple of weeks to help her with her depression    Pt states she has a  that works in her building and said that she would speak to Dr. Elke Youssef about the depression if she feels she needs more information. Her name is Paris Pedersen  -- phone [phone].
She can have the letter
Statement Selected

## 2022-10-14 NOTE — ED NOTES
Procedure ended.       Loretta White RN  07/04/20 1443 Well Adult Note  Name: Estuardo Santacruz Date: 10/14/2022   MRN: [id number] Sex: Female   Age: 38 Jailene Yee MD   norgestimate-ethinyl estradiol ([address] [address]) 

## 2022-10-18 ENCOUNTER — OFFICE VISIT (OUTPATIENT)
Dept: PAIN MANAGEMENT | Age: 67
End: 2022-10-18
Payer: COMMERCIAL

## 2022-10-18 VITALS
SYSTOLIC BLOOD PRESSURE: 125 MMHG | WEIGHT: 129 LBS | HEART RATE: 68 BPM | DIASTOLIC BLOOD PRESSURE: 69 MMHG | BODY MASS INDEX: 25.19 KG/M2

## 2022-10-18 DIAGNOSIS — J30.89 ALLERGIC RHINITIS DUE TO OTHER ALLERGIC TRIGGER, UNSPECIFIED SEASONALITY: ICD-10-CM

## 2022-10-18 DIAGNOSIS — G43.119 INTRACTABLE MIGRAINE WITH AURA WITHOUT STATUS MIGRAINOSUS: ICD-10-CM

## 2022-10-18 DIAGNOSIS — M75.81 TENDINITIS OF RIGHT ROTATOR CUFF: ICD-10-CM

## 2022-10-18 DIAGNOSIS — I10 ESSENTIAL HYPERTENSION: ICD-10-CM

## 2022-10-18 DIAGNOSIS — E11.40 CHRONIC PAINFUL DIABETIC NEUROPATHY (HCC): ICD-10-CM

## 2022-10-18 DIAGNOSIS — M51.36 DDD (DEGENERATIVE DISC DISEASE), LUMBAR: ICD-10-CM

## 2022-10-18 DIAGNOSIS — G89.4 CHRONIC PAIN SYNDROME: ICD-10-CM

## 2022-10-18 DIAGNOSIS — F51.01 PRIMARY INSOMNIA: ICD-10-CM

## 2022-10-18 DIAGNOSIS — M79.7 FIBROMYALGIA: ICD-10-CM

## 2022-10-18 DIAGNOSIS — K21.9 GASTRO-ESOPHAGEAL REFLUX DISEASE WITHOUT ESOPHAGITIS: ICD-10-CM

## 2022-10-18 DIAGNOSIS — M50.30 DDD (DEGENERATIVE DISC DISEASE), CERVICAL: ICD-10-CM

## 2022-10-18 DIAGNOSIS — F11.10 NARCOTIC ABUSE (HCC): ICD-10-CM

## 2022-10-18 PROCEDURE — G8417 CALC BMI ABV UP PARAM F/U: HCPCS | Performed by: INTERNAL MEDICINE

## 2022-10-18 PROCEDURE — 1090F PRES/ABSN URINE INCON ASSESS: CPT | Performed by: INTERNAL MEDICINE

## 2022-10-18 PROCEDURE — 2022F DILAT RTA XM EVC RTNOPTHY: CPT | Performed by: INTERNAL MEDICINE

## 2022-10-18 PROCEDURE — 99213 OFFICE O/P EST LOW 20 MIN: CPT | Performed by: INTERNAL MEDICINE

## 2022-10-18 PROCEDURE — 3052F HG A1C>EQUAL 8.0%<EQUAL 9.0%: CPT | Performed by: INTERNAL MEDICINE

## 2022-10-18 PROCEDURE — G8484 FLU IMMUNIZE NO ADMIN: HCPCS | Performed by: INTERNAL MEDICINE

## 2022-10-18 PROCEDURE — 1123F ACP DISCUSS/DSCN MKR DOCD: CPT | Performed by: INTERNAL MEDICINE

## 2022-10-18 PROCEDURE — G8427 DOCREV CUR MEDS BY ELIG CLIN: HCPCS | Performed by: INTERNAL MEDICINE

## 2022-10-18 PROCEDURE — 1036F TOBACCO NON-USER: CPT | Performed by: INTERNAL MEDICINE

## 2022-10-18 PROCEDURE — 3017F COLORECTAL CA SCREEN DOC REV: CPT | Performed by: INTERNAL MEDICINE

## 2022-10-18 PROCEDURE — G8400 PT W/DXA NO RESULTS DOC: HCPCS | Performed by: INTERNAL MEDICINE

## 2022-10-18 RX ORDER — QUETIAPINE FUMARATE 25 MG/1
25-50 TABLET, FILM COATED ORAL NIGHTLY
Qty: 60 TABLET | Refills: 1 | Status: SHIPPED | OUTPATIENT
Start: 2022-10-18

## 2022-10-18 RX ORDER — TIZANIDINE 4 MG/1
2-4 TABLET ORAL 2 TIMES DAILY PRN
Qty: 60 TABLET | Refills: 1 | Status: SHIPPED | OUTPATIENT
Start: 2022-10-18

## 2022-10-18 RX ORDER — DULOXETIN HYDROCHLORIDE 60 MG/1
60 CAPSULE, DELAYED RELEASE ORAL DAILY
Qty: 30 CAPSULE | Refills: 1 | Status: SHIPPED | OUTPATIENT
Start: 2022-10-18

## 2022-10-18 RX ORDER — ERENUMAB-AOOE 140 MG/ML
140 INJECTION, SOLUTION SUBCUTANEOUS
Qty: 1 ADJUSTABLE DOSE PRE-FILLED PEN SYRINGE | Refills: 1 | Status: SHIPPED | OUTPATIENT
Start: 2022-10-18

## 2022-10-18 RX ORDER — MONTELUKAST SODIUM 10 MG/1
10 TABLET ORAL DAILY
Qty: 30 TABLET | Refills: 1 | Status: SHIPPED | OUTPATIENT
Start: 2022-10-18

## 2022-10-18 RX ORDER — OMEPRAZOLE 20 MG/1
20 CAPSULE, DELAYED RELEASE ORAL DAILY
Qty: 30 CAPSULE | Refills: 1 | Status: SHIPPED | OUTPATIENT
Start: 2022-10-18

## 2022-10-18 NOTE — TELEPHONE ENCOUNTER
Medication:   Requested Prescriptions     Pending Prescriptions Disp Refills    omeprazole (PRILOSEC) 20 MG delayed release capsule [Pharmacy Med Name: OMEPRAZOLE 20 MG CPDR 20 Capsule] 30 capsule 1     Sig: TAKE 1 CAPSULE BY MOUTH DAILY    montelukast (SINGULAIR) 10 MG tablet [Pharmacy Med Name: MONTELUKAST SOD 10 MG TAB 10 Tablet] 30 tablet 1     Sig: TAKE 1 TABLET BY MOUTH DAILY        Last Filled:      Patient Phone Number: [phone] (home)     Last appt: 8/4/2022   Next appt: Visit date not found    Last OARRS:   RX Monitoring 4/9/2019   Attestation The Prescription Monitoring Report for this patient was reviewed today.

## 2022-10-18 NOTE — PROGRESS NOTES
Amanda Gilbert  1955  [id number]      HISTORY OF PRESENT ILLNESS:  Ms. Megan Fall is a 73 y.o. female returns for a follow up visit for pain management  She has a diagnosis of   1. Chronic pain syndrome    2. DDD (degenerative disc disease), lumbar    3. DDD (degenerative disc disease), cervical    4. Gastro-esophageal reflux disease without esophagitis    5. Intractable migraine with aura without status migrainosus    6. Fibromyalgia    7. Primary insomnia    8. Narcotic abuse ([facility] [address]    . She complains of pain in the  Neck, Low back, left shoulder, left upper arm, bilateral legs   She rates the pain 9/10 and describes it as sharp, aching, burning. Current treatment regimen has helped relieve about 10% of the pain. She denies any side effects from the current pain regimen. Patient reports that since the last follow up visit the physical functioning is worse, family/social relationships are unchanged, mood is worse sleep patterns are worse, and that the overall functioning is unchanged. Patient denies misusing/abusing her narcotic pain medications or using any illegal drugs. There are No indicators for possible drug abuse, addiction or diversion problems. Patient reports she has been having problems with walking and it hurts to walk. She says she is using Percocet still. She mentions she still gets headaches and migraines. She states she is using Aimovig still along with Ubrelvy. She complains she is having some increase headaches. She reports she is managing light house chores. ALLERGIES: Patients list of allergies were reviewed     MEDICATIONS: Ms. Megan Fall list of medications were reviewed. Her current medications are   Outpatient Medications Prior to Visit   Medication Sig Dispense Refill    labetalol (NORMODYNE) 200 MG tablet TAKE 1 TABLET BY MOUTH 2 TIMES DAILY 60 tablet 3    albuterol sulfate HFA (PROVENTIL;VENTOLIN;PROAIR) 108 (90 Base) MCG/ACT inhaler Inhale 2 puffs into the lungs every 4 hours as needed for Wheezing or Shortness of Breath 54 g 5    fluticasone-salmeterol (ADVAIR) 500-50 MCG/ACT AEPB diskus inhaler Inhale 1 puff into the lungs in the morning and 1 puff in the evening. 60 each 3    Blood Glucose Monitoring Suppl (FREESTYLE LITE) DAYO 1 Device by Does not apply route daily as needed (hyperglycemia) 1 each 0    oxyCODONE-acetaminophen (PERCOCET) 7.5-325 MG per tablet Take 1 tablet by mouth every 6 hours as needed for Pain (max 3-4 per day) for up to 28 days. 100 tablet 0    DULoxetine (CYMBALTA) 60 MG extended release capsule Take 1 capsule by mouth daily 30 capsule 1    QUEtiapine (SEROQUEL) 25 MG tablet Take 1-2 tablets by mouth nightly 60 tablet 1    tiZANidine (ZANAFLEX) 4 MG tablet Take 0.5-1 tablets by mouth 2 times daily as needed (muscle spasms) 60 tablet 1    Erenumab-aooe (AIMOVIG) 140 MG/ML SOAJ Inject 140 mg into the skin every 30 days 1 Adjustable Dose Pre-filled Pen Syringe 0    Continuous Blood Gluc  (FREESTYLE LISSETT 14 DAY READER) DAYO 1 each by Does not apply route 3 times daily 1 each 1    Continuous Blood Gluc Sensor (FREESTYLE LISSETT 14 DAY SENSOR) MISC 1 each by Does not apply route every 14 days 2 each 5    montelukast (SINGULAIR) 10 MG tablet TAKE 1 TABLET BY MOUTH DAILY 30 tablet 1    omeprazole (PRILOSEC) 20 MG delayed release capsule TAKE 1 CAPSULE BY MOUTH DAILY 30 capsule 1    amLODIPine (NORVASC) 10 MG tablet Take 1 tablet by mouth in the morning. 90 tablet 1    metoprolol succinate (TOPROL XL) 50 MG extended release tablet Take 1 tablet by mouth nightly 90 tablet 5    Ubrogepant (UBRELVY) 50 MG TABS Take 1 tablet by mouth daily Take 1 tablet po PRN at start of headaches, can repeat in 24 hours 30 tablet 1    apixaban (ELIQUIS) 2.5 MG TABS tablet Take 1 tablet by mouth in the morning and 1 tablet before bedtime.  180 tablet 1    Continuous Blood Gluc Sensor (DEXCOM G6 SENSOR) MISC 1 each by Does not apply route every 14 days 3 each 1    buPROPion (ZYBAN) 150 MG extended release tablet Take 1 tablet by mouth 2 times daily 60 tablet 5    clotrimazole-betamethasone (LOTRISONE) 1-0.05 % cream Apply topically 2 times daily. 5 g 5    Continuous Blood Gluc  (DEXCOM G6 ) DAYO Change sensor every 10 days 1 each 0    ASPIRIN LOW DOSE 81 MG EC tablet TAKE 1 TABLET BY MOUTH DAILY 90 tablet 5    Continuous Blood Gluc Transmit (DEXCOM G6 TRANSMITTER) MISC 1 each by Does not apply route daily 1 each 2    Continuous Blood Gluc  (DEXCOM G6 ) DAYO 1 each by Does not apply route daily 1 each 1    albuterol (PROVENTIL) (2.5 MG/3ML) 0.083% nebulizer solution TAKE 3 MLS BY NEBULIZATION EVERY 4 HOURS AS NEEDED FOR WHEEZING 360 mL 5    ULTICARE MINI PEN NEEDLES 31G X 6 MM MISC USE WITH INSULINS FOUR TIMES A DAY 90 each 1    insulin glargine (BASAGLAR KWIKPEN) 100 UNIT/ML injection pen Inject 8 Units into the skin 2 times daily 5 pen 3    isosorbide mononitrate (IMDUR) 60 MG extended release tablet Take 1 tablet by mouth daily 90 tablet 5    nitroGLYCERIN (NITRODUR) 0.1 MG/HR Place 1 patch onto the skin every 24 hours 30 patch 0    docusate sodium (COLACE) 100 MG capsule Take 100 mg by mouth 2 times daily      atorvastatin (LIPITOR) 80 MG tablet TAKE 1 TABLET BY MOUTH NIGHTLY 90 tablet 5    ranolazine (RANEXA) 1000 MG extended release tablet Take 1 tablet by mouth 2 times daily 60 tablet 8    linaclotide (LINZESS) 145 MCG capsule Take 1 capsule by mouth every morning (before breakfast) 30 capsule 5    nitroGLYCERIN (NITROSTAT) 0.4 MG SL tablet Place 1 tablet under the tongue every 5 minutes as needed for Chest pain up to max of 3 total doses. If no relief after 1 dose, call 911. 25 tablet 3     No facility-administered medications prior to visit. REVIEW OF SYSTEMS:    Respiratory: Negative for apnea, chest tightness and shortness of breath or change in baseline breathing. PHYSICAL EXAM:   Nursing note and vitals reviewed.  /69   Pulse 68   Wt 129 lb (58.5 kg)   BMI 25.19 kg/m²   Constitutional: She appears well-developed and well-nourished. No acute distress. Cardiovascular: Normal rate, regular rhythm, normal heart sounds, and does not have murmur. Pulmonary/Chest: Effort normal. No respiratory distress. She does not have wheezes in the lung fields. She has no rales. Neurological/Psychiatric:She is alert and oriented to person, place, and time. Coordination is  normal.  Her mood isAppropriate and affect is Neutral/Euthymic(normal) . IMPRESSION:   1. Chronic pain syndrome    2. DDD (degenerative disc disease), lumbar    3. DDD (degenerative disc disease), cervical    4. Gastro-esophageal reflux disease without esophagitis    5. Intractable migraine with aura without status migrainosus    6. Fibromyalgia    7. Primary insomnia    8. Narcotic abuse ([facility] [address]        PLAN:  Informed verbal consent was obtained  -ROM/Stretching exercises as advised   -She was advised to increase fluids ( 5-7  glasses of fluid daily), limit caffeine, avoid cheese products, increase dietary fiber, increase activity and exercise as tolerated and relax regularly and enjoy meals   -Continue with Aimovig and Ubrelvy for headaches and migraines   -Continue with all other adjuvants as before   -Walking as tolerated   -Consider Botox if she has ongoing headaches   Current Outpatient Medications   Medication Sig Dispense Refill    labetalol (NORMODYNE) 200 MG tablet TAKE 1 TABLET BY MOUTH 2 TIMES DAILY 60 tablet 3    albuterol sulfate HFA (PROVENTIL;VENTOLIN;PROAIR) 108 (90 Base) MCG/ACT inhaler Inhale 2 puffs into the lungs every 4 hours as needed for Wheezing or Shortness of Breath 54 g 5    fluticasone-salmeterol (ADVAIR) 500-50 MCG/ACT AEPB diskus inhaler Inhale 1 puff into the lungs in the morning and 1 puff in the evening.  60 each 3    Blood Glucose Monitoring Suppl (FREESTYLE LITE) DAYO 1 Device by Does not apply route daily as needed (hyperglycemia) 1 each 0 oxyCODONE-acetaminophen (PERCOCET) 7.5-325 MG per tablet Take 1 tablet by mouth every 6 hours as needed for Pain (max 3-4 per day) for up to 28 days. 100 tablet 0    DULoxetine (CYMBALTA) 60 MG extended release capsule Take 1 capsule by mouth daily 30 capsule 1    QUEtiapine (SEROQUEL) 25 MG tablet Take 1-2 tablets by mouth nightly 60 tablet 1    tiZANidine (ZANAFLEX) 4 MG tablet Take 0.5-1 tablets by mouth 2 times daily as needed (muscle spasms) 60 tablet 1    Erenumab-aooe (AIMOVIG) 140 MG/ML SOAJ Inject 140 mg into the skin every 30 days 1 Adjustable Dose Pre-filled Pen Syringe 0    Continuous Blood Gluc  (FREESTYLE LISSETT 14 DAY READER) DAYO 1 each by Does not apply route 3 times daily 1 each 1    Continuous Blood Gluc Sensor (FREESTYLE LISSETT 14 DAY SENSOR) MISC 1 each by Does not apply route every 14 days 2 each 5    montelukast (SINGULAIR) 10 MG tablet TAKE 1 TABLET BY MOUTH DAILY 30 tablet 1    omeprazole (PRILOSEC) 20 MG delayed release capsule TAKE 1 CAPSULE BY MOUTH DAILY 30 capsule 1    amLODIPine (NORVASC) 10 MG tablet Take 1 tablet by mouth in the morning. 90 tablet 1    metoprolol succinate (TOPROL XL) 50 MG extended release tablet Take 1 tablet by mouth nightly 90 tablet 5    Ubrogepant (UBRELVY) 50 MG TABS Take 1 tablet by mouth daily Take 1 tablet po PRN at start of headaches, can repeat in 24 hours 30 tablet 1    apixaban (ELIQUIS) 2.5 MG TABS tablet Take 1 tablet by mouth in the morning and 1 tablet before bedtime. 180 tablet 1    Continuous Blood Gluc Sensor (DEXCOM G6 SENSOR) MISC 1 each by Does not apply route every 14 days 3 each 1    buPROPion (ZYBAN) 150 MG extended release tablet Take 1 tablet by mouth 2 times daily 60 tablet 5    clotrimazole-betamethasone (LOTRISONE) 1-0.05 % cream Apply topically 2 times daily.  5 g 5    Continuous Blood Gluc  (DEXCOM G6 ) DAYO Change sensor every 10 days 1 each 0    ASPIRIN LOW DOSE 81 MG EC tablet TAKE 1 TABLET BY MOUTH DAILY 90 tablet 5    Continuous Blood Gluc Transmit (DEXCOM G6 TRANSMITTER) MISC 1 each by Does not apply route daily 1 each 2    Continuous Blood Gluc  (DEXCOM G6 ) DAYO 1 each by Does not apply route daily 1 each 1    albuterol (PROVENTIL) (2.5 MG/3ML) 0.083% nebulizer solution TAKE 3 MLS BY NEBULIZATION EVERY 4 HOURS AS NEEDED FOR WHEEZING 360 mL 5    ULTICARE MINI PEN NEEDLES 31G X 6 MM MISC USE WITH INSULINS FOUR TIMES A DAY 90 each 1    insulin glargine (BASAGLAR KWIKPEN) 100 UNIT/ML injection pen Inject 8 Units into the skin 2 times daily 5 pen 3    isosorbide mononitrate (IMDUR) 60 MG extended release tablet Take 1 tablet by mouth daily 90 tablet 5    nitroGLYCERIN (NITRODUR) 0.1 MG/HR Place 1 patch onto the skin every 24 hours 30 patch 0    docusate sodium (COLACE) 100 MG capsule Take 100 mg by mouth 2 times daily      atorvastatin (LIPITOR) 80 MG tablet TAKE 1 TABLET BY MOUTH NIGHTLY 90 tablet 5    ranolazine (RANEXA) 1000 MG extended release tablet Take 1 tablet by mouth 2 times daily 60 tablet 8    linaclotide (LINZESS) 145 MCG capsule Take 1 capsule by mouth every morning (before breakfast) 30 capsule 5    nitroGLYCERIN (NITROSTAT) 0.4 MG SL tablet Place 1 tablet under the tongue every 5 minutes as needed for Chest pain up to max of 3 total doses. If no relief after 1 dose, call 911. 25 tablet 3     No current facility-administered medications for this visit. I will continue her current medication regimen  which is part of the above treatment schedule. It has been helping with Ms. Meza's chronic  medical problems which for this visit include:   Diagnoses of Chronic pain syndrome, DDD (degenerative disc disease), lumbar, DDD (degenerative disc disease), cervical, Gastro-esophageal reflux disease without esophagitis, Intractable migraine with aura without status migrainosus, Fibromyalgia, Primary insomnia, and Narcotic abuse ([facility] [address] were pertinent to this visit.    Risks and benefits of the medications and other alternative treatments  including no treatment were discussed with the patient. The common side effects of these medications were also explained to the patient. Informed verbal consent was obtained. Goals of current treatment regimen include improvement in pain, restoration of functioning- with focus on improvement in physical performance, general activity, work or disability,emotional distress, health care utilization and  decreased medication consumption. Will continue to monitor progress towards achieving/maintaining therapeutic goals with special emphasis on  1. Improvement in perceived interfernce  of pain with ADL's. Ability to do home exercises independently. Ability to do household chores indoor and/or outdoor work and social and leisure activities. Improve psychosocial and physical functioning. - she is showing progression towards this treatment goal with the current regimen. She was advised against drinking alcohol with the narcotic pain medicines, advised against driving or handling machinery while adjusting the dose of medicines or if having cognitive  issues related to the current medications. Risk of overdose and death, if medicines not taken as prescribed, were also discussed. If the patient develops new symptoms or if the symptoms worsen, the patient should call the office. While transcribing every attempt was made to maintain the accuracy of the note in terms of it's contents,there may have been some errors made inadvertently. Thank you for allowing me to participate in the care of this patient.     Judy Lomax MD.    Cc: Rajinder Nava MD

## 2022-10-19 ENCOUNTER — PATIENT MESSAGE (OUTPATIENT)
Dept: PRIMARY CARE CLINIC | Age: 67
End: 2022-10-19

## 2022-10-19 ENCOUNTER — TELEPHONE (OUTPATIENT)
Dept: PAIN MANAGEMENT | Age: 67
End: 2022-10-19

## 2022-10-19 DIAGNOSIS — R52 PAIN DISORDER: Primary | ICD-10-CM

## 2022-10-19 NOTE — TELEPHONE ENCOUNTER
From: Kiko Force  To: Dr. Veronica Rosas: 10/19/2022 8:45 AM EDT  Subject: Dr Dr Stack Canal could you give me a refree to a pain Dr no longer seeing Dr Jackson Khan

## 2022-10-19 NOTE — TELEPHONE ENCOUNTER
Patient called and stated that [address] [place] [address] did not send her PERCOCET to the pharmacy.      [address], [address] [facility] -  [phone] (Ph: [phone])

## 2022-10-19 NOTE — TELEPHONE ENCOUNTER
Willean Denver, MD  You 1 hour ago (2:02 PM)     [place]  Pl call Insurance to find out where you can go     Generic Referral ready      Msg sent to pt'

## 2022-10-20 ENCOUNTER — TELEPHONE (OUTPATIENT)
Dept: PRIMARY CARE CLINIC | Age: 67
End: 2022-10-20

## 2022-10-20 NOTE — TELEPHONE ENCOUNTER
Patient called and says she's in constant pain and wants to know if Dr. Radha Eaton will prescribe something for her. Please advise.

## 2022-10-21 ENCOUNTER — TELEPHONE (OUTPATIENT)
Dept: PRIMARY CARE CLINIC | Age: 67
End: 2022-10-21

## 2022-10-21 NOTE — TELEPHONE ENCOUNTER
Cody Ivy MD  You 6 hours ago (9:57 AM)     [facility]  Unfortunately no. She messed up with Pain [facility] . She will have to go to ER       Pt was advised and expressed understanding.

## 2022-10-21 NOTE — TELEPHONE ENCOUNTER
----- Message from Chandana Zepedashawn sent at 10/21/2022 11:30 AM EDT -----  Subject: Hospital Follow Up    QUESTIONS  What hospital was the Patient Discharged from? [facility]  Date of Discharge? 2022-10-17  Discharge Location? Home  Reason for hospitalization as patient stated? Patient was hospitalized for Chest pains and High blood pressure. CALL BACK INFO  What is the best way for the office to contact you? OK to leave message on voicemail  Preferred Call Back Phone Number?  [phone]

## 2022-10-24 ENCOUNTER — PATIENT MESSAGE (OUTPATIENT)
Dept: PRIMARY CARE CLINIC | Age: 67
End: 2022-10-24

## 2022-10-24 DIAGNOSIS — J20.9 COPD WITH ACUTE BRONCHITIS (HCC): Primary | ICD-10-CM

## 2022-10-24 DIAGNOSIS — J44.0 COPD WITH ACUTE BRONCHITIS (HCC): Primary | ICD-10-CM

## 2022-10-24 RX ORDER — NEBULIZER ACCESSORIES
1 KIT MISCELLANEOUS DAILY PRN
Qty: 1 KIT | Refills: 0 | Status: SHIPPED | OUTPATIENT
Start: 2022-10-24 | End: 2022-10-25 | Stop reason: SDUPTHER

## 2022-10-24 NOTE — TELEPHONE ENCOUNTER
From: Kyle Wolf  To: Dr. Rey Urbina: 10/24/2022 10:40 AM EDT  Subject: Asthma     Can you send a prepretion for a breathing machine so that I could take my breathing treatment

## 2022-10-25 ENCOUNTER — PATIENT MESSAGE (OUTPATIENT)
Dept: PRIMARY CARE CLINIC | Age: 67
End: 2022-10-25

## 2022-10-25 DIAGNOSIS — J20.9 COPD WITH ACUTE BRONCHITIS (HCC): ICD-10-CM

## 2022-10-25 DIAGNOSIS — J44.0 COPD WITH ACUTE BRONCHITIS (HCC): ICD-10-CM

## 2022-10-25 RX ORDER — NEBULIZER ACCESSORIES
1 KIT MISCELLANEOUS DAILY PRN
Qty: 1 KIT | Refills: 0 | Status: SHIPPED | OUTPATIENT
Start: 2022-10-25 | End: 2022-11-08

## 2022-10-25 NOTE — TELEPHONE ENCOUNTER
From: Ashley Cargo  To: Dr. Langley Ashley: 10/25/2022 11:21 AM EDT  Subject: Asthma     Just talked to insurance company about the breathing machine the pharmacy you sent the perception to can't fill it so the insurance company said to send it to [facility]. Marianna Henson there phone number is [phone]. And there fax number is [phone].  And the company honor's my insurance

## 2022-10-28 DIAGNOSIS — I48.0 PAROXYSMAL A-FIB (HCC): ICD-10-CM

## 2022-10-31 NOTE — TELEPHONE ENCOUNTER
Spoke with a person at [facility] and she states pt' needs 2 OV notes faxed over to Fax [phone] or the previous fax- DONE

## 2022-11-02 RX ORDER — APIXABAN 2.5 MG/1
TABLET, FILM COATED ORAL
Qty: 90 TABLET | Refills: 5 | Status: SHIPPED | OUTPATIENT
Start: 2022-11-02

## 2022-11-04 ENCOUNTER — TELEPHONE (OUTPATIENT)
Dept: PRIMARY CARE CLINIC | Age: 67
End: 2022-11-04

## 2022-11-07 ENCOUNTER — TELEPHONE (OUTPATIENT)
Dept: ADMINISTRATIVE | Age: 67
End: 2022-11-07

## 2022-11-08 ENCOUNTER — TELEMEDICINE (OUTPATIENT)
Dept: PRIMARY CARE CLINIC | Age: 67
End: 2022-11-08
Payer: COMMERCIAL

## 2022-11-08 ENCOUNTER — PATIENT MESSAGE (OUTPATIENT)
Dept: PRIMARY CARE CLINIC | Age: 67
End: 2022-11-08

## 2022-11-08 ENCOUNTER — NURSE TRIAGE (OUTPATIENT)
Dept: OTHER | Facility: CLINIC | Age: 67
End: 2022-11-08

## 2022-11-08 DIAGNOSIS — N18.32 STAGE 3B CHRONIC KIDNEY DISEASE (HCC): ICD-10-CM

## 2022-11-08 DIAGNOSIS — R52 PAIN: ICD-10-CM

## 2022-11-08 DIAGNOSIS — R11.0 NAUSEA: ICD-10-CM

## 2022-11-08 DIAGNOSIS — R22.43 LOCALIZED SWELLING OF BOTH LOWER LEGS: Primary | ICD-10-CM

## 2022-11-08 PROCEDURE — 1123F ACP DISCUSS/DSCN MKR DOCD: CPT | Performed by: INTERNAL MEDICINE

## 2022-11-08 PROCEDURE — 1036F TOBACCO NON-USER: CPT | Performed by: INTERNAL MEDICINE

## 2022-11-08 PROCEDURE — 3017F COLORECTAL CA SCREEN DOC REV: CPT | Performed by: INTERNAL MEDICINE

## 2022-11-08 PROCEDURE — G8400 PT W/DXA NO RESULTS DOC: HCPCS | Performed by: INTERNAL MEDICINE

## 2022-11-08 PROCEDURE — G8417 CALC BMI ABV UP PARAM F/U: HCPCS | Performed by: INTERNAL MEDICINE

## 2022-11-08 PROCEDURE — G8484 FLU IMMUNIZE NO ADMIN: HCPCS | Performed by: INTERNAL MEDICINE

## 2022-11-08 PROCEDURE — G8427 DOCREV CUR MEDS BY ELIG CLIN: HCPCS | Performed by: INTERNAL MEDICINE

## 2022-11-08 PROCEDURE — 99214 OFFICE O/P EST MOD 30 MIN: CPT | Performed by: INTERNAL MEDICINE

## 2022-11-08 PROCEDURE — 1090F PRES/ABSN URINE INCON ASSESS: CPT | Performed by: INTERNAL MEDICINE

## 2022-11-08 RX ORDER — TRAMADOL HYDROCHLORIDE 50 MG/1
50 TABLET ORAL EVERY 6 HOURS PRN
Qty: 28 TABLET | Refills: 0 | Status: SHIPPED | OUTPATIENT
Start: 2022-11-08 | End: 2022-11-15

## 2022-11-08 RX ORDER — ACETAMINOPHEN 500 MG
500 TABLET ORAL 4 TIMES DAILY PRN
Qty: 120 TABLET | Refills: 5 | Status: SHIPPED | OUTPATIENT
Start: 2022-11-08

## 2022-11-08 RX ORDER — ONDANSETRON 4 MG/1
4 TABLET, ORALLY DISINTEGRATING ORAL 3 TIMES DAILY PRN
Qty: 30 TABLET | Refills: 1 | Status: SHIPPED | OUTPATIENT
Start: 2022-11-08

## 2022-11-08 ASSESSMENT — ENCOUNTER SYMPTOMS
CONSTIPATION: 0
CHEST TIGHTNESS: 0
ABDOMINAL DISTENTION: 0
NAUSEA: 0
BACK PAIN: 1
VOMITING: 0
BLOOD IN STOOL: 0

## 2022-11-08 NOTE — PROGRESS NOTES
Subjective:      Patient ID: Samantha Sol is a 78 y.o. female. 11/8/2022 Patient presents with:  Leg Swelling: Both legs swelling- unable to get in due to transportation issues  Nausea: In morning when waking up and sometimes though the day    Samantha Sol is a 78year old female with a past medical history of coronary artery disease status post stent placement, atrial fibrillation on Eliquis watchman implant in situ, hypertension, CHF, CKD, type 2 diabetes, COPD, fibromyalgia and chronic pain syndrome who presents with a chief complaint of chest pain and nausea. Patient did have a stress test performed at [facility] on August 28 that came back negative for evidence of ischemic changes. Troponins neg . Stress Test neg 8/22 . Discharged gome          Last seen  Star Schmitt 1237 8/4/2022 Patient presents with:  Diabetes  Hypertension  Headache  Stress:                6/22/2022 Patient presents with: Follow-Up from Hospital: [facility], 6/13-6/14/2022, headaches  CT Head and MRI Head done   Headache  Hypertension: states she is taking meds . 5/25/2022 Patient presents with:  Medicare AWV               1/27/2022 Patient presents with: Follow-Up from Hospital: [facility]-1/9/2022 - 1/13/2022 Chest pain, [place]    . Chest pain, ,, no further work-up recommended per cardiology    Diabetes mellitus, sliding scale    COPD, no acute exacerbation  . A. fib, controlled. Echo noted, discussed with Dr. Leobardo Esqueda, at this time we will keep on aspirin and Plavix, she will follow-up with him as outpatient. To discuss watchman device  Renal insuff  monitor closely, improved creatinine to 1.4 this morning. Anemia,  Chronic    Peripheral vascular disease with discoloration of second left toe, vascular surgery consulted, Doppler noted with no significant occlusive disease, echo ordered per vascular .                            10/1/2020               1/14/2020          9/24/19     8/31/19 !!!           8/29/19 hypertension, diabetes, hyperlipidemia, fibromyalgia, COPD, CKD, CHF and CAD           Review of Systems   Constitutional:  Negative for activity change, fatigue and fever. Flu vac 10/21     tdap 10/16      pneumovac    5/22 ; 10/13     Shingrex    covid vac 3/21  #2      HENT:          No teeth     No dentures    Eyes:         Last Eye Ex 3/20   Respiratory:  Negative for chest tightness. Shortness of breath: baseline. Getting urges to smoke again ! Known COPD   Cardiovascular:  Positive for leg swelling. Known CAD with Stents . Atrial Fibrillation , S/P Watchman device 5/22   Gastrointestinal:  Negative for abdominal distention, blood in stool, constipation, nausea and vomiting. Upper / lower endopscopy 4/19       No Fh of ca colon . Genitourinary:  Negative for dysuria, frequency, menstrual problem, urgency and vaginal discharge. Hysterectomy   Mammogram 1/16    Musculoskeletal:  Positive for arthralgias, back pain, gait problem and myalgias. Pain Disorder c/o Pain Management    Neurological:  Positive for weakness and headaches (Chronic . off and on ). Light-headedness: off and on . Hematological:            Did see Hematology for anemia . Bone Marrow exam Nl    Psychiatric/Behavioral:  Positive for dysphoric mood (Remembers her son's murder > 20 yrs ago). Negative for behavioral problems and sleep disturbance. The patient is not nervous/anxious. Objective:   Physical Exam  Vitals reviewed. Constitutional:       General: She is not in acute distress. Appearance: She is not ill-appearing. Comments: Vitals as noted 10/18/22       Pulmonary:      Effort: Pulmonary effort is normal.   Musculoskeletal:      Comments:        Neurological:      Mental Status: She is oriented to person, place, and time.    Psychiatric:         Attention and Perception: Attention normal.         Behavior: Behavior normal.       Assessment: Smooth Joy is a 78 y.o. female evaluated via  VIDEO on 11/8/2022 for Leg Swelling (Both legs swelling- unable to get in due to transportation issues) and Nausea (In morning when waking up and sometimes though the day)  . Documentation:  I communicated with the patient r about this   Details of this discussion including any medical advice provided:as noted     Total Time: minutes: 11-20 minutes    Smooth Joy was evaluated through a synchronous (real-time) audio encounter. Patient identification was verified at the start of the visit. She (or guardian if applicable) is aware that this is a billable service, which includes applicable co-pays. This visit was conducted with the patient's (and/or legal guardian's) verbal consent. She has not had a related appointment within my department in the past 7 days or scheduled within the next 24 hours. The patient was located at [facility]   The provider was located at [facility] (Appt Dept): [address] [place] [address],  [facility]. Note: not billable if this call serves to triage the patient into an appointment for the relevant concern    MD Maren Jackson was seen today for leg swelling, nausea and chest pain. Diagnoses and all orders for this visit:    Localized swelling of both lower legs  SE of Norvasc +  Ch kidney disease ? Advised to keep legs elevated when sitting     Nausea  multifactorial   -     ondansetron (ZOFRAN-ODT) 4 MG disintegrating tablet; Take 1 tablet by mouth 3 times daily as needed for Nausea or Vomiting    Pain  fall out with Dr Jose Guadalupe Genao . Needs to find new Dr for chronic pain management   stop duloxetine   -     External Referral To Pain Clinic  -     acetaminophen (TYLENOL) 500 MG tablet; Take 1 tablet by mouth 4 times daily as needed for Pain  -     traMADol (ULTRAM) 50 MG tablet; Take 1 tablet by mouth every 6 hours as needed for Pain for up to 7 days. Intended supply: 7 days.  Take lowest dose possible to manage pain    Stage 3b chronic kidney disease ([facility] [address]  advised to f/u with nephrology         Essential hypertension  Continue to take meds reg + low salt diet ( metoprol was replaced with Labetalol )  -     amLODIPine (NORVASC) 10 MG tablet; Take 1 tablet by mouth in the morning.  -     labetalol (NORMODYNE) 200 MG tablet; Take 1 tablet by mouth in the morning and 1 tablet before bedtime.  -     metoprolol succinate (TOPROL XL) 50 MG extended release tablet; Take 1 tablet by mouth nightly        Chronic pain syndrome  Headaches . Nl MRI/ MRA brain . Seen Neurology for tiny meningioma 6/22 . Watchful waiting recommended . Off Ubrelvi . On  aimovig now  -        High priority for COVID-19 vaccination  Needs third booster     Need for pneumococcal vaccination    Need for shingles vaccine  At [facility]   -     zoster recombinant adjuvanted vaccine [facility]) 50 MCG/0.5ML SUSR injection; Inject 0.5 mLs into the muscle once for 1 dose      Encounter for screening mammogram for high-risk patient  Long over due   -     NOHEMI DIGITAL SCREEN W OR WO CAD BILATERAL; Future    Age-related osteoporosis without current pathological fracture  -     DEXA BONE DENSITY 2 SITES; Future      Vaginal spotting if it continues will consult Gynae. On blood thinners         Type 2 diabetes mellitus with other circulatory complication, with long-term current use of insulin ([facility])  last  A1C 7.4    . Rechk again . Eye exam needed     -     insulin glargine (BASAGLAR KWIKPEN) 100 UNIT/ML injection pen; Inject 8 Units into the skin 2 times daily  -        Neuropathy        Coronary artery disease involving native coronary artery of native heart without angina pectoris  -        Diabetic gastroparesis (HCC)  Control sugars .        Other hyperlipidemia  80 mg Lipitor             Anxiety  and Depression      Plan:

## 2022-11-08 NOTE — TELEPHONE ENCOUNTER
Location of patient: [address] call from Jasmine Rodgers at [facility] with [facility]. Subjective: Caller states \"needs ED visit. Still having chest pain and now bilateral leg swelling and pain. \"     Current Symptoms: Went to ED on 11/2 for chest pain. Pain has gotten better since going to ED. Now legs are hurting and swollen. Pain in calves  Swelling in calves. Feels sob when up walking. Onset: 1 day ago; gradual    Associated Symptoms: NA    Pain Severity: 6/10; ; constant    Temperature: denies     What has been tried: nothing    LMP: NA Pregnant: NA    Recommended disposition: See in Office Today    Care advice provided, patient verbalizes understanding; denies any other questions or concerns; instructed to call back for any new or worsening symptoms. Patient/Caller agrees with recommended disposition; writer provided warm transfer to Alannah at [facility] for appointment scheduling    Attention Provider: Thank you for allowing me to participate in the care of your patient. The patient was connected to triage in response to information provided to the ECC/PSC. Please do not respond through this encounter as the response is not directed to a shared pool.     Reason for Disposition   MODERATE swelling of both ankles (e.g., swelling extends up to the knees) AND new-onset or worsening    Protocols used: Leg Swelling and Edema-ADULT-[place]

## 2022-11-08 NOTE — TELEPHONE ENCOUNTER
From: Candida Leak  To: Dr. Valentine Ko: 11/8/2022 3:20 PM EST  Subject: Pain     Having my record transfer to [facility] pain clinic and if you need to send something the fax number is ,[phone]

## 2022-11-10 NOTE — TELEPHONE ENCOUNTER
Submitted PA for Aimovig 140MG/ML auto-injectors, Key: [id number]. To [facility]. Status PENDING.

## 2022-11-14 ENCOUNTER — HOSPITAL ENCOUNTER (OUTPATIENT)
Age: 67
Setting detail: OBSERVATION
Discharge: HOME | End: 2022-11-15
Attending: EMERGENCY MEDICINE | Admitting: INTERNAL MEDICINE
Payer: COMMERCIAL

## 2022-11-14 ENCOUNTER — APPOINTMENT (OUTPATIENT)
Dept: GENERAL RADIOLOGY | Age: 67
End: 2022-11-14
Payer: COMMERCIAL

## 2022-11-14 DIAGNOSIS — Z86.79 HISTORY OF CORONARY ARTERY DISEASE: ICD-10-CM

## 2022-11-14 DIAGNOSIS — R07.9 LEFT-SIDED CHEST PAIN: Primary | ICD-10-CM

## 2022-11-14 DIAGNOSIS — I10 ESSENTIAL HYPERTENSION: ICD-10-CM

## 2022-11-14 DIAGNOSIS — D64.9 ANEMIA, UNSPECIFIED TYPE: ICD-10-CM

## 2022-11-14 DIAGNOSIS — N18.9 CHRONIC KIDNEY DISEASE, UNSPECIFIED CKD STAGE: ICD-10-CM

## 2022-11-14 LAB
ANION GAP SERPL CALCULATED.3IONS-SCNC: 13 MMOL/L (ref 3–16)
BASOPHILS ABSOLUTE: 0 K/UL (ref 0–0.2)
BASOPHILS RELATIVE PERCENT: 0.7 %
BUN BLDV-MCNC: 23 MG/DL (ref 7–20)
CALCIUM SERPL-MCNC: 9.2 MG/DL (ref 8.3–10.6)
CHLORIDE BLD-SCNC: 102 MMOL/L (ref 99–110)
CO2: 21 MMOL/L (ref 21–32)
CREAT SERPL-MCNC: 1.4 MG/DL (ref 0.6–1.2)
EOSINOPHILS ABSOLUTE: 0.3 K/UL (ref 0–0.6)
EOSINOPHILS RELATIVE PERCENT: 4.7 %
GFR SERPL CREATININE-BSD FRML MDRD: 41 ML/MIN/{1.73_M2}
GLUCOSE BLD-MCNC: 265 MG/DL (ref 70–99)
GLUCOSE BLD-MCNC: 295 MG/DL (ref 70–99)
HCT VFR BLD CALC: 31.1 % (ref 36–48)
HEMOGLOBIN: 9.7 G/DL (ref 12–16)
LIPASE: 21 U/L (ref 13–60)
LYMPHOCYTES ABSOLUTE: 0.9 K/UL (ref 1–5.1)
LYMPHOCYTES RELATIVE PERCENT: 15.6 %
MCH RBC QN AUTO: 29.3 PG (ref 26–34)
MCHC RBC AUTO-ENTMCNC: 31.3 G/DL (ref 31–36)
MCV RBC AUTO: 93.4 FL (ref 80–100)
MONOCYTES ABSOLUTE: 0.3 K/UL (ref 0–1.3)
MONOCYTES RELATIVE PERCENT: 4.8 %
NEUTROPHILS ABSOLUTE: 4.4 K/UL (ref 1.7–7.7)
NEUTROPHILS RELATIVE PERCENT: 74.2 %
PDW BLD-RTO: 15.3 % (ref 12.4–15.4)
PERFORMED ON: ABNORMAL
PLATELET # BLD: 211 K/UL (ref 135–450)
PMV BLD AUTO: 7.6 FL (ref 5–10.5)
POTASSIUM REFLEX MAGNESIUM: 4.3 MMOL/L (ref 3.5–5.1)
RBC # BLD: 3.32 M/UL (ref 4–5.2)
SODIUM BLD-SCNC: 136 MMOL/L (ref 136–145)
TROPONIN: <0.01 NG/ML
WBC # BLD: 5.9 K/UL (ref 4–11)

## 2022-11-14 PROCEDURE — 6370000000 HC RX 637 (ALT 250 FOR IP): Performed by: INTERNAL MEDICINE

## 2022-11-14 PROCEDURE — 6370000000 HC RX 637 (ALT 250 FOR IP): Performed by: PHYSICIAN ASSISTANT

## 2022-11-14 PROCEDURE — 6360000002 HC RX W HCPCS: Performed by: INTERNAL MEDICINE

## 2022-11-14 PROCEDURE — 84484 ASSAY OF TROPONIN QUANT: CPT

## 2022-11-14 PROCEDURE — 99285 EMERGENCY DEPT VISIT HI MDM: CPT

## 2022-11-14 PROCEDURE — G0378 HOSPITAL OBSERVATION PER HR: HCPCS

## 2022-11-14 PROCEDURE — 94640 AIRWAY INHALATION TREATMENT: CPT

## 2022-11-14 PROCEDURE — 96374 THER/PROPH/DIAG INJ IV PUSH: CPT

## 2022-11-14 PROCEDURE — 96376 TX/PRO/DX INJ SAME DRUG ADON: CPT

## 2022-11-14 PROCEDURE — 36415 COLL VENOUS BLD VENIPUNCTURE: CPT

## 2022-11-14 PROCEDURE — 93005 ELECTROCARDIOGRAM TRACING: CPT | Performed by: EMERGENCY MEDICINE

## 2022-11-14 PROCEDURE — 96375 TX/PRO/DX INJ NEW DRUG ADDON: CPT

## 2022-11-14 PROCEDURE — 71046 X-RAY EXAM CHEST 2 VIEWS: CPT

## 2022-11-14 PROCEDURE — 85025 COMPLETE CBC W/AUTO DIFF WBC: CPT

## 2022-11-14 PROCEDURE — 2580000003 HC RX 258: Performed by: INTERNAL MEDICINE

## 2022-11-14 PROCEDURE — 80048 BASIC METABOLIC PNL TOTAL CA: CPT

## 2022-11-14 PROCEDURE — 94761 N-INVAS EAR/PLS OXIMETRY MLT: CPT

## 2022-11-14 PROCEDURE — 6360000002 HC RX W HCPCS: Performed by: PHYSICIAN ASSISTANT

## 2022-11-14 PROCEDURE — 83690 ASSAY OF LIPASE: CPT

## 2022-11-14 PROCEDURE — 83036 HEMOGLOBIN GLYCOSYLATED A1C: CPT

## 2022-11-14 RX ORDER — NITROGLYCERIN 0.4 MG/1
0.4 TABLET SUBLINGUAL EVERY 5 MIN PRN
Status: DISCONTINUED | OUTPATIENT
Start: 2022-11-14 | End: 2022-11-15 | Stop reason: HOSPADM

## 2022-11-14 RX ORDER — ALBUTEROL SULFATE 90 UG/1
2 AEROSOL, METERED RESPIRATORY (INHALATION) EVERY 4 HOURS PRN
Status: DISCONTINUED | OUTPATIENT
Start: 2022-11-14 | End: 2022-11-15 | Stop reason: HOSPADM

## 2022-11-14 RX ORDER — ACETAMINOPHEN 650 MG/1
650 SUPPOSITORY RECTAL EVERY 6 HOURS PRN
Status: DISCONTINUED | OUTPATIENT
Start: 2022-11-14 | End: 2022-11-15 | Stop reason: HOSPADM

## 2022-11-14 RX ORDER — ASPIRIN 81 MG/1
81 TABLET, CHEWABLE ORAL DAILY
Status: DISCONTINUED | OUTPATIENT
Start: 2022-11-15 | End: 2022-11-15 | Stop reason: HOSPADM

## 2022-11-14 RX ORDER — SODIUM CHLORIDE 0.9 % (FLUSH) 0.9 %
5-40 SYRINGE (ML) INJECTION EVERY 12 HOURS SCHEDULED
Status: DISCONTINUED | OUTPATIENT
Start: 2022-11-14 | End: 2022-11-15 | Stop reason: HOSPADM

## 2022-11-14 RX ORDER — TRAMADOL HYDROCHLORIDE 50 MG/1
50 TABLET ORAL EVERY 6 HOURS PRN
Status: DISCONTINUED | OUTPATIENT
Start: 2022-11-14 | End: 2022-11-15 | Stop reason: HOSPADM

## 2022-11-14 RX ORDER — MORPHINE SULFATE 4 MG/ML
4 INJECTION, SOLUTION INTRAMUSCULAR; INTRAVENOUS ONCE
Status: COMPLETED | OUTPATIENT
Start: 2022-11-14 | End: 2022-11-14

## 2022-11-14 RX ORDER — DEXTROSE MONOHYDRATE 100 MG/ML
INJECTION, SOLUTION INTRAVENOUS CONTINUOUS PRN
Status: DISCONTINUED | OUTPATIENT
Start: 2022-11-14 | End: 2022-11-15 | Stop reason: HOSPADM

## 2022-11-14 RX ORDER — MONTELUKAST SODIUM 10 MG/1
10 TABLET ORAL NIGHTLY
Status: DISCONTINUED | OUTPATIENT
Start: 2022-11-14 | End: 2022-11-15 | Stop reason: HOSPADM

## 2022-11-14 RX ORDER — SODIUM CHLORIDE 9 MG/ML
INJECTION, SOLUTION INTRAVENOUS PRN
Status: DISCONTINUED | OUTPATIENT
Start: 2022-11-14 | End: 2022-11-15 | Stop reason: HOSPADM

## 2022-11-14 RX ORDER — SODIUM CHLORIDE 0.9 % (FLUSH) 0.9 %
5-40 SYRINGE (ML) INJECTION PRN
Status: DISCONTINUED | OUTPATIENT
Start: 2022-11-14 | End: 2022-11-15 | Stop reason: HOSPADM

## 2022-11-14 RX ORDER — FEXOFENADINE HCL 180 MG/1
180 TABLET ORAL DAILY
COMMUNITY
Start: 2022-05-04

## 2022-11-14 RX ORDER — POLYETHYLENE GLYCOL 3350 17 G/17G
17 POWDER, FOR SOLUTION ORAL DAILY PRN
Status: DISCONTINUED | OUTPATIENT
Start: 2022-11-14 | End: 2022-11-15 | Stop reason: HOSPADM

## 2022-11-14 RX ORDER — AMLODIPINE BESYLATE 10 MG/1
10 TABLET ORAL DAILY
Status: DISCONTINUED | OUTPATIENT
Start: 2022-11-15 | End: 2022-11-15 | Stop reason: HOSPADM

## 2022-11-14 RX ORDER — MORPHINE SULFATE 2 MG/ML
1 INJECTION, SOLUTION INTRAMUSCULAR; INTRAVENOUS EVERY 4 HOURS PRN
Status: DISCONTINUED | OUTPATIENT
Start: 2022-11-14 | End: 2022-11-15 | Stop reason: HOSPADM

## 2022-11-14 RX ORDER — ATORVASTATIN CALCIUM 80 MG/1
80 TABLET, FILM COATED ORAL NIGHTLY
Status: DISCONTINUED | OUTPATIENT
Start: 2022-11-14 | End: 2022-11-14 | Stop reason: SDUPTHER

## 2022-11-14 RX ORDER — ONDANSETRON 2 MG/ML
4 INJECTION INTRAMUSCULAR; INTRAVENOUS EVERY 6 HOURS PRN
Status: DISCONTINUED | OUTPATIENT
Start: 2022-11-14 | End: 2022-11-15 | Stop reason: HOSPADM

## 2022-11-14 RX ORDER — ONDANSETRON 4 MG/1
4 TABLET, ORALLY DISINTEGRATING ORAL EVERY 8 HOURS PRN
Status: DISCONTINUED | OUTPATIENT
Start: 2022-11-14 | End: 2022-11-15 | Stop reason: HOSPADM

## 2022-11-14 RX ORDER — INSULIN LISPRO 100 [IU]/ML
0-8 INJECTION, SOLUTION INTRAVENOUS; SUBCUTANEOUS
Status: DISCONTINUED | OUTPATIENT
Start: 2022-11-15 | End: 2022-11-15 | Stop reason: HOSPADM

## 2022-11-14 RX ORDER — ACETAMINOPHEN 325 MG/1
650 TABLET ORAL EVERY 6 HOURS PRN
Status: DISCONTINUED | OUTPATIENT
Start: 2022-11-14 | End: 2022-11-15 | Stop reason: HOSPADM

## 2022-11-14 RX ORDER — ATORVASTATIN CALCIUM 80 MG/1
80 TABLET, FILM COATED ORAL NIGHTLY
Status: DISCONTINUED | OUTPATIENT
Start: 2022-11-14 | End: 2022-11-15 | Stop reason: HOSPADM

## 2022-11-14 RX ORDER — LABETALOL HYDROCHLORIDE 5 MG/ML
10 INJECTION, SOLUTION INTRAVENOUS ONCE
Status: DISCONTINUED | OUTPATIENT
Start: 2022-11-14 | End: 2022-11-15 | Stop reason: HOSPADM

## 2022-11-14 RX ORDER — AMLODIPINE BESYLATE 10 MG/1
TABLET ORAL
Qty: 30 TABLET | Refills: 1 | Status: ON HOLD | OUTPATIENT
Start: 2022-11-14 | End: 2022-11-15 | Stop reason: HOSPADM

## 2022-11-14 RX ORDER — BUPROPION HYDROCHLORIDE 150 MG/1
150 TABLET, EXTENDED RELEASE ORAL DAILY
COMMUNITY
Start: 2022-11-14

## 2022-11-14 RX ORDER — PANTOPRAZOLE SODIUM 40 MG/1
40 TABLET, DELAYED RELEASE ORAL
Status: DISCONTINUED | OUTPATIENT
Start: 2022-11-15 | End: 2022-11-15 | Stop reason: HOSPADM

## 2022-11-14 RX ORDER — RANOLAZINE 500 MG/1
1000 TABLET, EXTENDED RELEASE ORAL 2 TIMES DAILY
Status: DISCONTINUED | OUTPATIENT
Start: 2022-11-14 | End: 2022-11-15 | Stop reason: HOSPADM

## 2022-11-14 RX ORDER — QUETIAPINE FUMARATE 25 MG/1
25 TABLET, FILM COATED ORAL NIGHTLY
Status: DISCONTINUED | OUTPATIENT
Start: 2022-11-14 | End: 2022-11-15 | Stop reason: HOSPADM

## 2022-11-14 RX ORDER — ENOXAPARIN SODIUM 100 MG/ML
40 INJECTION SUBCUTANEOUS DAILY
Status: DISCONTINUED | OUTPATIENT
Start: 2022-11-15 | End: 2022-11-14

## 2022-11-14 RX ORDER — TIZANIDINE 4 MG/1
4 TABLET ORAL DAILY
COMMUNITY
Start: 2022-11-14

## 2022-11-14 RX ORDER — KETOROLAC TROMETHAMINE 30 MG/ML
15 INJECTION, SOLUTION INTRAMUSCULAR; INTRAVENOUS ONCE
Status: COMPLETED | OUTPATIENT
Start: 2022-11-14 | End: 2022-11-14

## 2022-11-14 RX ORDER — INSULIN GLARGINE 100 [IU]/ML
8 INJECTION, SOLUTION SUBCUTANEOUS 2 TIMES DAILY
Status: DISCONTINUED | OUTPATIENT
Start: 2022-11-14 | End: 2022-11-15 | Stop reason: HOSPADM

## 2022-11-14 RX ORDER — NITROGLYCERIN 0.4 MG/1
0.4 TABLET SUBLINGUAL EVERY 5 MIN PRN
Status: COMPLETED | OUTPATIENT
Start: 2022-11-14 | End: 2022-11-14

## 2022-11-14 RX ORDER — INSULIN LISPRO 100 [IU]/ML
0-4 INJECTION, SOLUTION INTRAVENOUS; SUBCUTANEOUS NIGHTLY
Status: DISCONTINUED | OUTPATIENT
Start: 2022-11-14 | End: 2022-11-15 | Stop reason: HOSPADM

## 2022-11-14 RX ORDER — ASPIRIN 81 MG/1
81 TABLET ORAL DAILY
Status: DISCONTINUED | OUTPATIENT
Start: 2022-11-15 | End: 2022-11-15 | Stop reason: HOSPADM

## 2022-11-14 RX ORDER — LABETALOL 200 MG/1
200 TABLET, FILM COATED ORAL 2 TIMES DAILY
Status: DISCONTINUED | OUTPATIENT
Start: 2022-11-14 | End: 2022-11-15 | Stop reason: HOSPADM

## 2022-11-14 RX ORDER — ALBUTEROL SULFATE 2.5 MG/3ML
2.5 SOLUTION RESPIRATORY (INHALATION) EVERY 4 HOURS PRN
Status: DISCONTINUED | OUTPATIENT
Start: 2022-11-14 | End: 2022-11-15 | Stop reason: HOSPADM

## 2022-11-14 RX ORDER — ISOSORBIDE MONONITRATE 60 MG/1
60 TABLET, EXTENDED RELEASE ORAL DAILY
Status: DISCONTINUED | OUTPATIENT
Start: 2022-11-15 | End: 2022-11-15 | Stop reason: HOSPADM

## 2022-11-14 RX ORDER — BUPROPION HYDROCHLORIDE 150 MG/1
150 TABLET, EXTENDED RELEASE ORAL DAILY
Status: DISCONTINUED | OUTPATIENT
Start: 2022-11-15 | End: 2022-11-15 | Stop reason: HOSPADM

## 2022-11-14 RX ORDER — TIZANIDINE 4 MG/1
4 TABLET ORAL DAILY
Status: DISCONTINUED | OUTPATIENT
Start: 2022-11-15 | End: 2022-11-15 | Stop reason: HOSPADM

## 2022-11-14 RX ADMIN — MORPHINE SULFATE 1 MG: 2 INJECTION, SOLUTION INTRAMUSCULAR; INTRAVENOUS at 23:50

## 2022-11-14 RX ADMIN — ATORVASTATIN CALCIUM 80 MG: 80 TABLET, FILM COATED ORAL at 19:53

## 2022-11-14 RX ADMIN — MONTELUKAST 10 MG: 10 TABLET, FILM COATED ORAL at 22:00

## 2022-11-14 RX ADMIN — QUETIAPINE FUMARATE 25 MG: 25 TABLET ORAL at 22:00

## 2022-11-14 RX ADMIN — LABETALOL HYDROCHLORIDE 200 MG: 200 TABLET, FILM COATED ORAL at 22:00

## 2022-11-14 RX ADMIN — RANOLAZINE 1000 MG: 500 TABLET, FILM COATED, EXTENDED RELEASE ORAL at 22:01

## 2022-11-14 RX ADMIN — MOMETASONE FUROATE AND FORMOTEROL FUMARATE DIHYDRATE 2 PUFF: 100; 5 AEROSOL RESPIRATORY (INHALATION) at 21:13

## 2022-11-14 RX ADMIN — NITROGLYCERIN 1 INCH: 20 OINTMENT TOPICAL at 14:57

## 2022-11-14 RX ADMIN — MORPHINE SULFATE 4 MG: 4 INJECTION, SOLUTION INTRAMUSCULAR; INTRAVENOUS at 12:37

## 2022-11-14 RX ADMIN — SODIUM CHLORIDE, PRESERVATIVE FREE 10 ML: 5 INJECTION INTRAVENOUS at 22:03

## 2022-11-14 RX ADMIN — NITROGLYCERIN 0.4 MG: 0.4 TABLET, ORALLY DISINTEGRATING SUBLINGUAL at 11:58

## 2022-11-14 RX ADMIN — TRAMADOL HYDROCHLORIDE 50 MG: 50 TABLET, COATED ORAL at 22:02

## 2022-11-14 RX ADMIN — MORPHINE SULFATE 4 MG: 4 INJECTION, SOLUTION INTRAMUSCULAR; INTRAVENOUS at 14:56

## 2022-11-14 RX ADMIN — NITROGLYCERIN 0.4 MG: 0.4 TABLET, ORALLY DISINTEGRATING SUBLINGUAL at 11:46

## 2022-11-14 RX ADMIN — MORPHINE SULFATE 1 MG: 2 INJECTION, SOLUTION INTRAMUSCULAR; INTRAVENOUS at 19:54

## 2022-11-14 RX ADMIN — INSULIN GLARGINE 8 UNITS: 100 INJECTION, SOLUTION SUBCUTANEOUS at 22:05

## 2022-11-14 RX ADMIN — ONDANSETRON 4 MG: 2 INJECTION INTRAMUSCULAR; INTRAVENOUS at 19:53

## 2022-11-14 RX ADMIN — KETOROLAC TROMETHAMINE 15 MG: 30 INJECTION, SOLUTION INTRAMUSCULAR at 11:47

## 2022-11-14 RX ADMIN — APIXABAN 2.5 MG: 2.5 TABLET, FILM COATED ORAL at 22:00

## 2022-11-14 ASSESSMENT — PAIN - FUNCTIONAL ASSESSMENT
PAIN_FUNCTIONAL_ASSESSMENT: 0-10
PAIN_FUNCTIONAL_ASSESSMENT: 0-10
PAIN_FUNCTIONAL_ASSESSMENT: PREVENTS OR INTERFERES SOME ACTIVE ACTIVITIES AND ADLS
PAIN_FUNCTIONAL_ASSESSMENT: 0-10
PAIN_FUNCTIONAL_ASSESSMENT: PREVENTS OR INTERFERES SOME ACTIVE ACTIVITIES AND ADLS
PAIN_FUNCTIONAL_ASSESSMENT: PREVENTS OR INTERFERES SOME ACTIVE ACTIVITIES AND ADLS

## 2022-11-14 ASSESSMENT — PAIN DESCRIPTION - PAIN TYPE
TYPE: ACUTE PAIN

## 2022-11-14 ASSESSMENT — PAIN DESCRIPTION - ORIENTATION
ORIENTATION: MID;LEFT

## 2022-11-14 ASSESSMENT — PAIN DESCRIPTION - DESCRIPTORS
DESCRIPTORS: TIGHTNESS;PRESSURE
DESCRIPTORS: PRESSURE;SQUEEZING
DESCRIPTORS: SQUEEZING;PRESSURE
DESCRIPTORS: SQUEEZING;HEAVINESS
DESCRIPTORS: SQUEEZING;PRESSURE
DESCRIPTORS: PRESSURE;SQUEEZING
DESCRIPTORS: PRESSURE;SQUEEZING
DESCRIPTORS: SQUEEZING;CRUSHING
DESCRIPTORS: PRESSURE;SQUEEZING

## 2022-11-14 ASSESSMENT — PAIN DESCRIPTION - LOCATION
LOCATION: CHEST

## 2022-11-14 ASSESSMENT — PAIN SCALES - GENERAL
PAINLEVEL_OUTOF10: 10
PAINLEVEL_OUTOF10: 8
PAINLEVEL_OUTOF10: 10
PAINLEVEL_OUTOF10: 10
PAINLEVEL_OUTOF10: 9
PAINLEVEL_OUTOF10: 10
PAINLEVEL_OUTOF10: 9
PAINLEVEL_OUTOF10: 5
PAINLEVEL_OUTOF10: 8
PAINLEVEL_OUTOF10: 7

## 2022-11-14 ASSESSMENT — PAIN DESCRIPTION - FREQUENCY
FREQUENCY: CONTINUOUS

## 2022-11-14 ASSESSMENT — HEART SCORE: ECG: 0

## 2022-11-14 ASSESSMENT — PAIN DESCRIPTION - ONSET
ONSET: ON-GOING
ONSET: SUDDEN
ONSET: ON-GOING

## 2022-11-14 NOTE — ED PROVIDER NOTES
Attending Note:    The patient was seen and examined by the mid-level provider. I also completed a face-to-face examination. HPI: Adriane Menjivar is a 78 y.o. female who presents to the emergency department with a complaint of left-sided chest pain that she describes as something heavy laying on her chest.  She noticed it last night before going to bed. She took a nitroglycerin tablet sublingually and it relieved her pain. She woke up today and was experiencing the pain again today. She took 3 nitroglycerin without relief. She currently rates her pain a 3/10 intensity and states that it has been continuous throughout the day. She denies any associated shortness of breath, diaphoresis, dyspnea on exertion. She denies any back pain but the pain does radiate to the left shoulder, down her left arm, and her left jaw. She denies any headache. No syncope or palpitations. No fever or chills. No cough or cold symptoms. Past medical history is significant for hypertension, hyperlipidemia, coronary artery disease, atrial fibrillation, watchman procedure, diastolic heart failure, CKD, neuropathy, GERD, fibromyalgia, NSTEMI, and previous coronary stent placement. She is anticoagulated on Eliquis. 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 Physical Exam:     Constitutional: No apparent distress. Appears comfortable. Head: No visible evidence of trauma. Normocephalic. Eyes: Pupils equal and reactive. No photophobia. Conjunctiva normal.    HENT: Oral mucosa moist.  Airway patent. Neck:  Soft and supple. Nontender. Heart:  Regular rate and rhythm. No murmur. Lungs:  Clear to auscultation. No wheezes, rales, or ronchi. No conversational dyspnea or accessory muscle use. Abdomen:  Soft, non-distended. Nontender. No guarding rigidity or rebound. Musculoskeletal: Extremities non-tender with full range of motion.   Radial and dorsalis pedis pulses are equal bilaterally. No calf tenderness erythema or edema. Neurological: Alert and oriented x 3. Speech clear. No acute focal motor or sensory deficits. No dysarthria. No aphasia. No pronator drift. Skin: Skin is warm and dry. No rash. Psychiatric: Normal mood and affect. Behavior is normal.     DIAGNOSTIC RESULTS     EKG: All EKG's are interpreted by the Emergency Department Physician who either signs or Co-signs this chart in the absence of a cardiologist.    Normal sinus rhythm. Rate 89. MN interval 118 ms. QRS duration 94 ms. QTc 445 ms. R axis 38 degrees. There is no ST elevation. Nonspecific T wave abnormalities. EKG is identical in comparison to a previous EKG from October 9, 2022. Repeat EKG at 4:18 PM: Normal sinus rhythm. Rate 85. MN interval 116 ms. QRS duration 86 ms. QTc 406 ms. R axis 37 degrees. Nonspecific T wave abnormalities. No ST elevation. No evolving changes.     RADIOLOGY:   Non-plain film images such as CT, Ultrasound and MRI are read by the radiologist. Plain radiographic images are visualized and preliminarily interpreted by the emergency physician with the below findings:        Interpretation per the Radiologist below, if available at the time of this note:    XR CHEST (2 VW)   Final Result   No acute cardiopulmonary findings               ED BEDSIDE ULTRASOUND:   Performed by ED Physician - none    LABS:  Labs Reviewed   CBC WITH AUTO DIFFERENTIAL - Abnormal; Notable for the following components:       Result Value    RBC 3.32 (*)     Hemoglobin 9.7 (*)     Hematocrit 31.1 (*)     Lymphocytes Absolute 0.9 (*)     All other components within normal limits   BASIC METABOLIC PANEL W/ REFLEX TO MG FOR LOW K - Abnormal; Notable for the following components:    Glucose 295 (*)     BUN 23 (*)     Creatinine 1.4 (*)     Est, Glom Filt Rate 41 (*)     All other components within normal limits   TROPONIN   LIPASE   TROPONIN       All other labs were within normal range or not returned as of this dictation. EMERGENCY DEPARTMENT COURSE and DIFFERENTIAL DIAGNOSIS/MDM:   Vitals:    Vitals:    11/14/22 1445 11/14/22 1500 11/14/22 1530 11/14/22 1700   BP: (!) 177/74 (!) 193/78 (!) 172/74 (!) 154/84   Pulse: 80 83 81 88   Resp: 14 18 22 14   Temp:       TempSrc:       SpO2: 100% 100% 100% 100%   Weight:       Height:               MDM        I personally saw and performed a substantive portion of the visit including all aspects of the medical decision making. The patient presents with left-sided chest pain described as a heaviness with radiation to the left neck jaw and shoulder. She currently rates her pain a 3/10 intensity. She had no relief with nitroglycerin prior to arrival.  She was given nitroglycerin 1 inch paste, aspirin 324 mg p.o. EKG reveals normal sinus rhythm with nonspecific T wave abnormalities. Unchanged from prior tracing from October 9, 2022. Blood pressure is elevated at 186/81. She was given Nitropaste 1 inch and the labetalol 10 mg IV. Prior records were reviewed including stress test from August 28, 2022. Conclusions  Summary  Non-diagnostic ECG d/t inadequate achieved HF (Pharmacologic study)  Normal myocardial perfusion study. Normal LV size and systolic function. Overall findings represent a low risk scan. Although the patient had a negative recent stress test, she is considered high risk and symptoms are suspicious for ischemia. Therefore, she will be admitted. Repeat EKG was obtained. No evolving changes. Nonspecific T wave abnormalities. Repeat 3-hour troponin is less than 0.01. The patient will be admitted for further treatment and evaluation. A call was placed to the hospitalist on-call for admission by the physician assistant. CRITICAL CARE TIME     I personally saw the patient and independently provided 30 minutes of non-concurrent critical care out of the total shared critical care time provided.  This excludes separately reportable procedures. There was a high probability of clinically significant/life threatening deterioration in the patient's condition which required my urgent intervention. CONSULTS:  None    PROCEDURES:  Unless otherwise noted below, none     Procedures        FINAL IMPRESSION      1. Left-sided chest pain    2. History of coronary artery disease    3. Chronic kidney disease, unspecified CKD stage    4. Anemia, unspecified type          DISPOSITION/PLAN   DISPOSITION Admitted 11/14/2022 05:34:10 PM      PATIENT REFERRED TO:  No follow-up provider specified. DISCHARGE MEDICATIONS:  New Prescriptions    No medications on file     Controlled Substances Monitoring:     RX Monitoring 4/9/2019   Attestation The Prescription Monitoring Report for this patient was reviewed today. (Please note that portions of this note were completed with a voice recognition program.  Efforts were made to edit the dictations but occasionally words are mis-transcribed. )    [address] Tiburcio Almanzar DO (electronically signed)  Attending Emergency Physician       Francheska Liu DO  11/14/22 [id number]

## 2022-11-14 NOTE — TELEPHONE ENCOUNTER
Medication:   Requested Prescriptions     Pending Prescriptions Disp Refills    amLODIPine (NORVASC) 10 MG tablet [Pharmacy Med Name: AMLODIPINE BESYLATE 10 MG T 10 Tablet] 30 tablet 1     Sig: TAKE 1 TABLET BY MOUTH DAILY        Last Filled:      Patient Phone Number: [phone] (home)     Last appt: 11/8/2022   Next appt: 11/23/2022    Last OARRS:   RX Monitoring 4/9/2019   Attestation The Prescription Monitoring Report for this patient was reviewed today.

## 2022-11-14 NOTE — PROGRESS NOTES
Medication Reconciliation    List of medications patient is currently taking is complete. Source of information: 1. Conversation with patient and patient's family at bedside                                      2. EPIC records      Allergies  Patient has no known allergies. Notes regarding home medications:   1. Patient received some of her home medications prior to arrival to the emergency department.     Too Jj, Pharmacy Student  11/14/2022 2:41 PM

## 2022-11-14 NOTE — ED PROVIDER NOTES
[address] [facility]        Pt Name: Aruna Garcia  MRN: [id number]  [place] 1955  Date of evaluation: 11/14/2022  Provider: BILLY Self  PCP: Edgardo Sears MD  Note Started: 12:32 PM EST     This patient was also seen and evaluated by the attending physician Padmini Poole, [address]       Chief Complaint   Patient presents with    Chest Pain     States she had chest pain yesterday she took 3 nitro with relief. This morning she had chest pain that woke her up out of her sleep at 0800. She took 3 nitro this am w/o relief. C/o 10/10 pain now        HISTORY OF PRESENT ILLNESS   (Location, Timing/Onset, Context/Setting, Quality, Duration, Modifying Factors, Severity, Associated Signs and Symptoms)  Note limiting factors. Aruna Garcia is a 78 y.o. female who presents with complaint of chest pain and some nausea. She says she had some chest pain last night, took a nitroglycerin and it helped, but this morning the pain came back. She says she took 3 nitroglycerin at home this morning and none of it helped. Says the pain is 10 out of 10, in the left side of her chest, also in the left arm and neck. Says she has past history of MI, still sees cardiology. Feels a little bit short of breath and a little bit nauseous, no vomiting. Pain began 8:00 AM.  Says she has had pain like this before, and it feels like when she had a heart attack. She denies abdominal pain. No relevant trauma. Denies any unusual cough or any cold symptoms or fever. Nursing Notes were all reviewed and agreed with or any disagreements were addressed in the HPI. REVIEW OF SYSTEMS    (2-9 systems for level 4, 10 or more for level 5)     Positives and pertinent negatives as per HPI.      PAST MEDICAL HISTORY     Past Medical History:   Diagnosis Date    Acid reflux     Anemia     Anxiety and depression     Arthritis     Asthma     Atrial fibrillation ([facility] [address]     CAD (coronary artery disease) 12/3/2012    Cerebral artery occlusion with cerebral infarction [facility])     TIA\"\"S--right sided weakness & headache    CHF (congestive heart failure) ([facility])     Chronic kidney disease--stage III     40% kidney function    COPD (chronic obstructive pulmonary disease) ([facility])     DM2 (diabetes mellitus, type 2) ([facility] [address]     Dysarthria     Fibromyalgia 6/7/2016    Headache(784.0) 2/19/2013    Hemisensory loss     History of blood transfusion 11/2020    pt denies having transfusion reaction    Hyperlipidemia     Hypertension     IBS (irritable bowel syndrome)     Inferior vena cava occlusion (HCC)     Keratitis     Meningioma ([facility])     MI, old     Neuropathy     Superior vena cava obstruction     Temporal arteritis ([facility] [address] 2/24/2014    Wears glasses        SURGICAL HISTORY     Past Surgical History:   Procedure Laterality Date    ABLATION OF DYSRHYTHMIC FOCUS  1999  and 11/2020    times 2    ARTERY BIOPSY Right 04/23/2014    RIGHT TEMPORAL ARTERY BIOPSY    CAROTID ARTERY SURGERY Left     clean up per pt    CATARACT REMOVAL Bilateral     CHOLECYSTECTOMY      COLONOSCOPY N/A 4/9/2021    COLONOSCOPY WITH BIOPSY performed by Nelsy Dickerson MD at [address] [place] [address] N/A 10/15/2021    COLONOSCOPY performed by Nelsy Dickerson MD at [place] [address] (CERVIX STATUS UNKNOWN)      JOINT REPLACEMENT Right     KNEE ARTHROSCOPY Right     PTCA  10/2019    LAD and RCA inrtervention    TUNNELED VENOUS PORT PLACEMENT      left thigh.   SMART PORT-----Removed--total of 4 port placement and removal    UPPER GASTROINTESTINAL ENDOSCOPY N/A 7/6/2020    EGD DIAGNOSTIC ONLY performed by Daxa Galdamez MD at [address] [place] [address]       Previous Medications    ACETAMINOPHEN (TYLENOL) 500 MG TABLET    Take 1 tablet by mouth 4 times daily as needed for Pain    ALBUTEROL (PROVENTIL) (2.5 MG/3ML) 0.083% NEBULIZER SOLUTION    TAKE 3 MLS BY NEBULIZATION EVERY 4 HOURS AS NEEDED FOR WHEEZING    ALBUTEROL SULFATE HFA (PROVENTIL;VENTOLIN;PROAIR) 108 (90 BASE) MCG/ACT INHALER    Inhale 2 puffs into the lungs every 4 hours as needed for Wheezing or Shortness of Breath    ASPIRIN LOW DOSE 81 MG EC TABLET    TAKE 1 TABLET BY MOUTH DAILY    ATORVASTATIN (LIPITOR) 80 MG TABLET    TAKE 1 TABLET BY MOUTH NIGHTLY    BUPROPION (WELLBUTRIN SR) 150 MG EXTENDED RELEASE TABLET    Take 150 mg by mouth daily    CLOTRIMAZOLE-BETAMETHASONE (LOTRISONE) 1-0.05 % CREAM    Apply topically 2 times daily. DOCUSATE SODIUM (COLACE) 100 MG CAPSULE    Take 100 mg by mouth 2 times daily    ELIQUIS 2.5 MG TABS TABLET    TAKE 1 TABLET BY MOUTH IN THE MORNING AND 1 TABLET BEFORE BEDTIME. ERENUMAB-AOOE (AIMOVIG) 140 MG/ML SOAJ    Inject 140 mg into the skin every 30 days    FEXOFENADINE (ALLEGRA) 180 MG TABLET    Take 180 mg by mouth daily    FLUTICASONE-SALMETEROL (ADVAIR) 500-50 MCG/ACT AEPB DISKUS INHALER    Inhale 1 puff into the lungs in the morning and 1 puff in the evening. INSULIN GLARGINE (BASAGLAR KWIKPEN) 100 UNIT/ML INJECTION PEN    Inject 8 Units into the skin 2 times daily    ISOSORBIDE MONONITRATE (IMDUR) 60 MG EXTENDED RELEASE TABLET    Take 1 tablet by mouth daily    LABETALOL (NORMODYNE) 200 MG TABLET    TAKE 1 TABLET BY MOUTH 2 TIMES DAILY    LINACLOTIDE (LINZESS) 145 MCG CAPSULE    Take 1 capsule by mouth every morning (before breakfast)    MONTELUKAST (SINGULAIR) 10 MG TABLET    TAKE 1 TABLET BY MOUTH DAILY    NITROGLYCERIN (NITROSTAT) 0.4 MG SL TABLET    Place 1 tablet under the tongue every 5 minutes as needed for Chest pain up to max of 3 total doses. If no relief after 1 dose, call 911.     OMEPRAZOLE (PRILOSEC) 20 MG DELAYED RELEASE CAPSULE    TAKE 1 CAPSULE BY MOUTH DAILY    ONDANSETRON (ZOFRAN-ODT) 4 MG DISINTEGRATING TABLET    Take 1 tablet by mouth 3 times daily as needed for Nausea or Vomiting    QUETIAPINE (SEROQUEL) 25 MG TABLET    Take 1-2 tablets by mouth nightly    RANOLAZINE (RANEXA) 1000 MG EXTENDED RELEASE TABLET    Take 1 tablet by mouth 2 times daily    TIZANIDINE (ZANAFLEX) 4 MG TABLET    Take 4 mg by mouth daily    TRAMADOL (ULTRAM) 50 MG TABLET    Take 1 tablet by mouth every 6 hours as needed for Pain for up to 7 days. Intended supply: 7 days. Take lowest dose possible to manage pain       ALLERGIES     Patient has no known allergies. FAMILYHISTORY       Family History   Problem Relation Age of Onset    Diabetes Mother     High Cholesterol Mother     Stroke Mother     Cancer Mother     High Blood Pressure Mother     No Known Problems Paternal Grandfather         lung issues         SOCIAL HISTORY       Social History     Tobacco Use    Smoking status: Former     Packs/day: 0.50     Years: 35.00     Pack years: 17.50     Types: Cigarettes     Quit date: 2018     Years since quittin.3    Smokeless tobacco: Never    Tobacco comments:     5/13/15 has not smoked since hospitalization - kh   Vaping Use    Vaping Use: Never used   Substance Use Topics    Alcohol use: No     Alcohol/week: 0.0 standard drinks    Drug use: Never       SCREENINGS    [place] Coma Scale  Eye Opening: Spontaneous  Best Verbal Response: Oriented  Best Motor Response: Obeys commands  [place] Coma Scale Score: 15      PHYSICAL EXAM    (up to 7 for level 4, 8 or more for level 5)     ED Triage Vitals   BP Temp Temp Source Heart Rate Resp SpO2 Height Weight   22 1027 22 1058 22 1058 22 1027 22 1030 22 1027 22 1058 22 1058   (!) 184/84 98.4 °F (36.9 °C) Oral 99 15 100 % 5' (1.524 m) 134 lb 4.2 oz (60.9 kg)       Physical Exam  Vitals and nursing note reviewed. Constitutional:       General: She is not in acute distress. Appearance: Normal appearance. She is not ill-appearing. HENT:      Head: Normocephalic and atraumatic.       Nose: Nose normal.   Eyes:      General:         Right eye: No discharge. Left eye: No discharge. Cardiovascular:      Rate and Rhythm: Normal rate and regular rhythm. Heart sounds: Normal heart sounds. Pulmonary:      Effort: Pulmonary effort is normal. No respiratory distress. Breath sounds: Normal breath sounds. No stridor. No wheezing, rhonchi or rales. Musculoskeletal:         General: Normal range of motion. Cervical back: Normal range of motion. Skin:     General: Skin is warm and dry. Neurological:      General: No focal deficit present. Mental Status: She is alert and oriented to person, place, and time. Psychiatric:         Mood and Affect: Mood normal.         Behavior: Behavior normal.       DIAGNOSTIC RESULTS   LABS:    Labs Reviewed   CBC WITH AUTO DIFFERENTIAL - Abnormal; Notable for the following components:       Result Value    RBC 3.32 (*)     Hemoglobin 9.7 (*)     Hematocrit 31.1 (*)     Lymphocytes Absolute 0.9 (*)     All other components within normal limits   BASIC METABOLIC PANEL W/ REFLEX TO MG FOR LOW K - Abnormal; Notable for the following components:    Glucose 295 (*)     BUN 23 (*)     Creatinine 1.4 (*)     Est, Glom Filt Rate 41 (*)     All other components within normal limits   TROPONIN   LIPASE   TROPONIN       When ordered only abnormal lab results are displayed. All other labs were within normal range or not returned as of this dictation. EKG: When ordered, EKG's are interpreted by the Emergency Department Physician in the absence of a cardiologist.  Please see their note for interpretation of EKG. RADIOLOGY:   All images such as plain radiographs, CT, Ultrasound and MRI are interpreted by a radiologist. Some images are visualized and preliminarily interpreted by me and/or the ED attending physician.     Interpretation per the radiologist below, if available at the time of this note:    XR CHEST (2 VW)   Final Result   No acute cardiopulmonary findings             CONSULTS:  None    PROCEDURES Unless otherwise noted below, none. Procedures    EMERGENCY DEPARTMENT COURSE and DIFFERENTIAL DIAGNOSIS/MDM:   Vitals:    Vitals:    11/14/22 1445 11/14/22 1500 11/14/22 1530 11/14/22 1700   BP: (!) 177/74 (!) 193/78 (!) 172/74 (!) 154/84   Pulse: 80 83 81 88   Resp: 14 18 22 14   Temp:       TempSrc:       SpO2: 100% 100% 100% 100%   Weight:       Height:           Patient was given the following medications:  Medications   labetalol (NORMODYNE;TRANDATE) injection 10 mg (has no administration in time range)   nitroGLYCERIN (NITROSTAT) SL tablet 0.4 mg (0.4 mg SubLINGual Given 11/14/22 1158)   ketorolac (TORADOL) injection 15 mg (15 mg IntraVENous Given 11/14/22 1147)   morphine (PF) injection 4 mg (4 mg IntraVENous Given 11/14/22 1237)   nitroglycerin (NITRO-BID) 2 % ointment 1 inch (1 inch Topical Given 11/14/22 1457)   morphine (PF) injection 4 mg (4 mg IntraVENous Given 11/14/22 1456)           Is this patient to be included in the SEP-1 Core Measure due to severe sepsis or septic shock? No   Exclusion criteria - the patient is NOT to be included for SEP-1 Core Measure due to: Infection is not suspected    Patient's EKG was nonconcerning, and her lab work showed some mild anemia, stable chronic kidney disease, and negative initial troponin and delta troponin. Had a phonological stress test done in August of this year that was normal, has history of coronary artery disease. Her chest pain was ongoing while in the emergency department. Heart score is 5. She benefit from hospital admission for further care. I consulted the hospitalist, who agreed to accept and admit the patient. The patient verbalized understanding and agreement with this plan of care. CRITICAL CARE TIME   There was a high probability of clinically significant and/or life threatening deterioration in this patient's condition which required my urgent intervention.   I independently provided 10 minutes of non-concurrent critical care out of the total shared critical care time provided. This excludes any time for separately reportable procedures. FINAL IMPRESSION      1. Left-sided chest pain    2. History of coronary artery disease    3. Chronic kidney disease, unspecified CKD stage    4. Anemia, unspecified type          DISPOSITION/PLAN   DISPOSITION Decision To Admit 11/14/2022 03:16:49 PM      PATIENT REFERRED TO:  No follow-up provider specified.     DISCHARGE MEDICATIONS:  New Prescriptions    No medications on file       DISCONTINUED MEDICATIONS:  Discontinued Medications    BUPROPION (ZYBAN) 150 MG EXTENDED RELEASE TABLET    Take 1 tablet by mouth 2 times daily    METOPROLOL SUCCINATE (TOPROL XL) 50 MG EXTENDED RELEASE TABLET    Take 1 tablet by mouth nightly            (Please note that portions of this note were completed with a voice recognition program.  Efforts were made to edit the dictations but occasionally words are mis-transcribed.)    BILLY Chen (electronically signed)        BILLY Chen  11/14/22 5635

## 2022-11-14 NOTE — ED NOTES
Report called to  Cooper Conde RN   To Room  9131  Cardiac monitor on during transfer  Pt's pain level   5/10  VSS, Afebrile   IV site is clean, dry and intact, Normal saline locked   Family updated on transfer          Itz Liang RN  11/14/22 Elijah Condon

## 2022-11-15 VITALS
OXYGEN SATURATION: 97 % | DIASTOLIC BLOOD PRESSURE: 57 MMHG | SYSTOLIC BLOOD PRESSURE: 100 MMHG | BODY MASS INDEX: 26.1 KG/M2 | HEART RATE: 69 BPM | HEIGHT: 60 IN | WEIGHT: 132.94 LBS | TEMPERATURE: 98.4 F | RESPIRATION RATE: 17 BRPM

## 2022-11-15 LAB
ANION GAP SERPL CALCULATED.3IONS-SCNC: 14 MMOL/L (ref 3–16)
BUN BLDV-MCNC: 32 MG/DL (ref 7–20)
CALCIUM SERPL-MCNC: 8.8 MG/DL (ref 8.3–10.6)
CHLORIDE BLD-SCNC: 100 MMOL/L (ref 99–110)
CO2: 18 MMOL/L (ref 21–32)
CREAT SERPL-MCNC: 1.5 MG/DL (ref 0.6–1.2)
EKG ATRIAL RATE: 68 BPM
EKG ATRIAL RATE: 85 BPM
EKG ATRIAL RATE: 89 BPM
EKG DIAGNOSIS: NORMAL
EKG P AXIS: -12 DEGREES
EKG P AXIS: -23 DEGREES
EKG P AXIS: -4 DEGREES
EKG P-R INTERVAL: 106 MS
EKG P-R INTERVAL: 116 MS
EKG P-R INTERVAL: 118 MS
EKG Q-T INTERVAL: 342 MS
EKG Q-T INTERVAL: 366 MS
EKG Q-T INTERVAL: 434 MS
EKG QRS DURATION: 84 MS
EKG QRS DURATION: 86 MS
EKG QRS DURATION: 88 MS
EKG QTC CALCULATION (BAZETT): 406 MS
EKG QTC CALCULATION (BAZETT): 445 MS
EKG QTC CALCULATION (BAZETT): 461 MS
EKG R AXIS: 37 DEGREES
EKG R AXIS: 38 DEGREES
EKG R AXIS: 47 DEGREES
EKG T AXIS: 113 DEGREES
EKG T AXIS: 114 DEGREES
EKG T AXIS: 68 DEGREES
EKG VENTRICULAR RATE: 68 BPM
EKG VENTRICULAR RATE: 85 BPM
EKG VENTRICULAR RATE: 89 BPM
ESTIMATED AVERAGE GLUCOSE: 226 MG/DL
GFR SERPL CREATININE-BSD FRML MDRD: 38 ML/MIN/{1.73_M2}
GLUCOSE BLD-MCNC: 171 MG/DL (ref 70–99)
GLUCOSE BLD-MCNC: 195 MG/DL (ref 70–99)
GLUCOSE BLD-MCNC: 278 MG/DL (ref 70–99)
GLUCOSE BLD-MCNC: 293 MG/DL (ref 70–99)
HBA1C MFR BLD: 9.5 %
HCT VFR BLD CALC: 30.7 % (ref 36–48)
HEMOGLOBIN: 9.9 G/DL (ref 12–16)
MAGNESIUM: 2.1 MG/DL (ref 1.8–2.4)
MCH RBC QN AUTO: 29.6 PG (ref 26–34)
MCHC RBC AUTO-ENTMCNC: 32.1 G/DL (ref 31–36)
MCV RBC AUTO: 92.2 FL (ref 80–100)
PDW BLD-RTO: 15.4 % (ref 12.4–15.4)
PERFORMED ON: ABNORMAL
PLATELET # BLD: 194 K/UL (ref 135–450)
PMV BLD AUTO: 7.4 FL (ref 5–10.5)
POTASSIUM SERPL-SCNC: 4.5 MMOL/L (ref 3.5–5.1)
RBC # BLD: 3.33 M/UL (ref 4–5.2)
SODIUM BLD-SCNC: 132 MMOL/L (ref 136–145)
TROPONIN: <0.01 NG/ML
WBC # BLD: 3.8 K/UL (ref 4–11)

## 2022-11-15 PROCEDURE — 93005 ELECTROCARDIOGRAM TRACING: CPT | Performed by: INTERNAL MEDICINE

## 2022-11-15 PROCEDURE — 6360000002 HC RX W HCPCS: Performed by: INTERNAL MEDICINE

## 2022-11-15 PROCEDURE — 2580000003 HC RX 258: Performed by: INTERNAL MEDICINE

## 2022-11-15 PROCEDURE — 93010 ELECTROCARDIOGRAM REPORT: CPT | Performed by: INTERNAL MEDICINE

## 2022-11-15 PROCEDURE — 96376 TX/PRO/DX INJ SAME DRUG ADON: CPT

## 2022-11-15 PROCEDURE — G0378 HOSPITAL OBSERVATION PER HR: HCPCS

## 2022-11-15 PROCEDURE — 84484 ASSAY OF TROPONIN QUANT: CPT

## 2022-11-15 PROCEDURE — 6370000000 HC RX 637 (ALT 250 FOR IP): Performed by: INTERNAL MEDICINE

## 2022-11-15 PROCEDURE — 85027 COMPLETE CBC AUTOMATED: CPT

## 2022-11-15 PROCEDURE — 83735 ASSAY OF MAGNESIUM: CPT

## 2022-11-15 PROCEDURE — 36415 COLL VENOUS BLD VENIPUNCTURE: CPT

## 2022-11-15 PROCEDURE — 80048 BASIC METABOLIC PNL TOTAL CA: CPT

## 2022-11-15 PROCEDURE — 94760 N-INVAS EAR/PLS OXIMETRY 1: CPT

## 2022-11-15 PROCEDURE — 94640 AIRWAY INHALATION TREATMENT: CPT

## 2022-11-15 RX ORDER — LABETALOL 200 MG/1
100 TABLET, FILM COATED ORAL 2 TIMES DAILY
Qty: 60 TABLET | Refills: 3 | Status: SHIPPED | OUTPATIENT
Start: 2022-11-15 | End: 2022-11-28 | Stop reason: DRUGHIGH

## 2022-11-15 RX ADMIN — ISOSORBIDE MONONITRATE 60 MG: 60 TABLET, EXTENDED RELEASE ORAL at 09:33

## 2022-11-15 RX ADMIN — TIZANIDINE 4 MG: 4 TABLET ORAL at 09:33

## 2022-11-15 RX ADMIN — INSULIN GLARGINE 8 UNITS: 100 INJECTION, SOLUTION SUBCUTANEOUS at 09:44

## 2022-11-15 RX ADMIN — ASPIRIN 81 MG: 81 TABLET, COATED ORAL at 09:33

## 2022-11-15 RX ADMIN — BUPROPION HYDROCHLORIDE 150 MG: 150 TABLET, EXTENDED RELEASE ORAL at 09:33

## 2022-11-15 RX ADMIN — APIXABAN 2.5 MG: 2.5 TABLET, FILM COATED ORAL at 09:33

## 2022-11-15 RX ADMIN — SODIUM CHLORIDE, PRESERVATIVE FREE 10 ML: 5 INJECTION INTRAVENOUS at 09:43

## 2022-11-15 RX ADMIN — MORPHINE SULFATE 1 MG: 2 INJECTION, SOLUTION INTRAMUSCULAR; INTRAVENOUS at 15:06

## 2022-11-15 RX ADMIN — ONDANSETRON 4 MG: 2 INJECTION INTRAMUSCULAR; INTRAVENOUS at 12:16

## 2022-11-15 RX ADMIN — INSULIN LISPRO 4 UNITS: 100 INJECTION, SOLUTION INTRAVENOUS; SUBCUTANEOUS at 09:44

## 2022-11-15 RX ADMIN — MORPHINE SULFATE 1 MG: 2 INJECTION, SOLUTION INTRAMUSCULAR; INTRAVENOUS at 04:41

## 2022-11-15 RX ADMIN — PANTOPRAZOLE SODIUM 40 MG: 40 TABLET, DELAYED RELEASE ORAL at 06:46

## 2022-11-15 RX ADMIN — MOMETASONE FUROATE AND FORMOTEROL FUMARATE DIHYDRATE 2 PUFF: 100; 5 AEROSOL RESPIRATORY (INHALATION) at 08:30

## 2022-11-15 RX ADMIN — RANOLAZINE 1000 MG: 500 TABLET, FILM COATED, EXTENDED RELEASE ORAL at 09:32

## 2022-11-15 RX ADMIN — ASPIRIN 81 MG 81 MG: 81 TABLET ORAL at 09:33

## 2022-11-15 RX ADMIN — MORPHINE SULFATE 1 MG: 2 INJECTION, SOLUTION INTRAMUSCULAR; INTRAVENOUS at 09:36

## 2022-11-15 ASSESSMENT — PAIN DESCRIPTION - ORIENTATION
ORIENTATION: LEFT
ORIENTATION: LEFT

## 2022-11-15 ASSESSMENT — PAIN DESCRIPTION - PAIN TYPE: TYPE: ACUTE PAIN

## 2022-11-15 ASSESSMENT — PAIN DESCRIPTION - LOCATION
LOCATION: CHEST

## 2022-11-15 ASSESSMENT — PAIN SCALES - GENERAL
PAINLEVEL_OUTOF10: 7
PAINLEVEL_OUTOF10: 8
PAINLEVEL_OUTOF10: 7
PAINLEVEL_OUTOF10: 8

## 2022-11-15 ASSESSMENT — PAIN DESCRIPTION - DESCRIPTORS
DESCRIPTORS: ACHING;DISCOMFORT
DESCRIPTORS: ACHING;PRESSURE
DESCRIPTORS: PENETRATING

## 2022-11-15 NOTE — ACP (ADVANCE CARE PLANNING)
Advance Care Planning     Advance Care Planning Activator (Inpatient)  Conversation Note      Date of ACP Conversation: 11/15/2022     Conversation Conducted with: Patient with Decision Making Capacity    ACP Activator: Jillian Martin RN    Health Care Decision Maker:     Current Designated Health Care Decision Maker:     Primary Decision Maker: Milena Mace Child - [phone]    Secondary Decision Maker: Gt [place] Child - [phone]    Secondary Decision Maker: Smita Aaron Child - [phone]    Care Preferences    Ventilation: \"If you were in your present state of health and suddenly became very ill and were unable to breathe on your own, what would your preference be about the use of a ventilator (breathing machine) if it were available to you? \"      Would the patient desire the use of ventilator (breathing machine)?: yes    \"If your health worsens and it becomes clear that your chance of recovery is unlikely, what would your preference be about the use of a ventilator (breathing machine) if it were available to you? \"     Would the patient desire the use of ventilator (breathing machine)?: yes      Resuscitation  \"CPR works best to restart the heart when there is a sudden event, like a heart attack, in someone who is otherwise healthy. Unfortunately, CPR does not typically restart the heart for people who have serious health conditions or who are very sick. \"    \"In the event your heart stopped as a result of an underlying serious health condition, would you want attempts to be made to restart your heart (answer \"yes\" for attempt to resuscitate) or would you prefer a natural death (answer \"no\" for do not attempt to resuscitate)? \" yes       [] Yes   [x] No   Educated Patient / Abelino Vega regarding differences between Advance Directives and portable DNR orders.     Length of ACP Conversation in minutes:  3    Conversation Outcomes:  [x] ACP discussion completed  [] Existing advance directive reviewed with patient; no changes to patient's previously recorded wishes  [] New Advance Directive completed  [] Portable Do Not Rescitate prepared for Provider review and signature  [] POLST/POST/MOLST/MOST prepared for Provider review and signature      Follow-up plan:    [] Schedule follow-up conversation to continue planning  [] Referred individual to Provider for additional questions/concerns   [] Advised patient/agent/surrogate to review completed ACP document and update if needed with changes in condition, patient preferences or care setting    [] This note routed to one or more involved healthcare providers

## 2022-11-15 NOTE — PLAN OF CARE
Problem: Discharge Planning  Goal: Discharge to home or other facility with appropriate resources  11/15/2022 1115 by Racheal Kraft RN  Outcome: Progressing  Flowsheets (Taken 11/15/2022 0930)  Discharge to home or other facility with appropriate resources: Identify barriers to discharge with patient and caregiver  11/14/2022 2307 by Junior Mantilla RN  Outcome: Progressing     Problem: Pain  Goal: Verbalizes/displays adequate comfort level or baseline comfort level  11/15/2022 1115 by Racheal Kraft RN  Outcome: Progressing  Flowsheets (Taken 11/15/2022 0900)  Verbalizes/displays adequate comfort level or baseline comfort level: Encourage patient to monitor pain and request assistance  11/14/2022 2307 by Junior Mantilla RN  Outcome: Progressing

## 2022-11-15 NOTE — PROGRESS NOTES
Removed pt IV and applied a dry dressing without complications. Reviewed all discharge instructions and answered all questions. Pt walked down with  for discharge home.

## 2022-11-15 NOTE — DISCHARGE INSTRUCTIONS
Follow up with pcp in 1 week   Follow up with cardiologist as directed    Check blood pressure in am and pm. If systolic bp (top number) is less than 100, do not take labetalol. Notify cardiologist if top number is running less than 100 or above 140 consistently.

## 2022-11-15 NOTE — DISCHARGE SUMMARY
Hospital Medicine Discharge Summary    Patient ID: Sosa Borja      Patient's PCP: Westley Michele MD    Admit Date: 11/14/2022     Discharge Date: 11/15/2022      Admitting Physician: Polly Enriquez MD     Discharge Physician: Alice Baumgarten, APRN - CNP     Discharge Diagnoses  Chest pain  Diabetes mellitus  CAD status post stenting  Hypertension  Hyperlipidemia  CKD stage III, creatinine at baseline  Afib on [facility]      Hospital Course:   Patient is a 70-year-old female who presents to the hospital for chest pain, according to the patient she has been having chest pain to the left side of her chest, radiating to her left shoulder, neck, patient mention she has a history of CAD and has been having the stents however her chest pain feels like the same. Patient currently rates her chest pain is 10/10 intensity, she took 3 nitroglycerin at home without much relief which made her come to the hospital.  Otherwise denies diarrhea constipation dysuria. Patient mention she feels nauseated but denied vomiting. Denied fevers chills. Chest pain  -Cardiology was consulted. Noted that patient does have a history of CAD with recurrent chest pain, and this is noncardiac chest pain. Does not require any further cardiovascular work-up. -Troponin remain normal.  EKG without acute changes.  -She asked for prescription for narcotic pain medication at discharge, was not prescribed. She can follow-up with her PCP for chronic pain management. Diabetes mellitus  CAD status post stenting  Hypertension  Hyperlipidemia  CKD stage III, creatinine at baseline  Afib on eliquis  -Continue home meds as ordered  -Stop Norvasc, as blood pressure was soft. Decrease labetalol to 100 mg p.o. twice daily. Patient denied worsening chest pain, shortness of breath, palpitations, abdominal pain, nausea vomiting, diarrhea, dysuria, headache lightheadedness or dizziness. Appetite good. Voiding without difficulty. Reviewed plan of care with patient, verbalized understanding and agreement. Denied further questions or needs. Physical Exam Performed:     BP (!) 100/57   Pulse 69   Temp 98.4 °F (36.9 °C) (Oral)   Resp 17   Ht 5' (1.524 m)   Wt 132 lb 15 oz (60.3 kg)   SpO2 97%   BMI 25.96 kg/m²       General appearance:  No apparent distress, appears stated age and cooperative. HEENT:  Normal cephalic, atraumatic without obvious deformity. Pupils equal, round, and reactive to light. Extra ocular muscles intact. Conjunctivae/corneas clear. Neck: Supple, with full range of motion. No jugular venous distention. Trachea midline. Respiratory:  Normal respiratory effort. Clear to auscultation, bilaterally without Rales/Wheezes/Rhonchi. Cardiovascular:  Regular rate and rhythm with normal S1/S2 without murmurs, rubs or gallops. Abdomen: Soft, non-tender, non-distended with normal bowel sounds. Musculoskeletal:  No clubbing, cyanosis or edema bilaterally. Full range of motion without deformity. Skin: Skin color, texture, turgor normal.  No rashes or lesions. Neurologic:  Neurovascularly intact without any focal sensory/motor deficits. Cranial nerves: II-XII intact, grossly non-focal.  Psychiatric:  Alert and oriented, thought content appropriate, normal insight  Capillary Refill: Brisk,< 3 seconds   Peripheral Pulses: +2 palpable, equal bilaterally       Labs:  For convenience and continuity at follow-up the following most recent labs are provided:      CBC:    Lab Results   Component Value Date/Time    WBC 3.8 11/15/2022 05:57 AM    HGB 9.9 11/15/2022 05:57 AM    HCT 30.7 11/15/2022 05:57 AM     11/15/2022 05:57 AM       Renal:    Lab Results   Component Value Date/Time     11/15/2022 08:13 AM    K 4.5 11/15/2022 08:13 AM    K 4.3 11/14/2022 11:43 AM     11/15/2022 08:13 AM    CO2 18 11/15/2022 08:13 AM    BUN 32 11/15/2022 08:13 AM    CREATININE 1.5 11/15/2022 08:13 AM    CALCIUM 8.8 11/15/2022 08:13 AM    PHOS 3.5 08/29/2022 01:10 PM         Significant Diagnostic Studies    Radiology:   XR CHEST (2 VW)   Final Result   No acute cardiopulmonary findings                Consults:     IP CONSULT TO CARDIOLOGY    Disposition: Home    Condition at Discharge: Stable    Discharge Instructions/Follow-up:      Follow up with pcp in 1 week   Follow up with cardiologist as directed    Check blood pressure in am and pm. If systolic bp (top number) is less than 100, do not take labetalol. Notify cardiologist if top number is running less than 100 or above 140 consistently. Code Status:  Prior     Activity: activity as tolerated    Diet: cardiac diet and diabetic diet      Discharge Medications:     Discharge Medication List as of 11/15/2022  6:07 PM             Details   labetalol (NORMODYNE) 200 MG tablet Take 0.5 tablets by mouth 2 times daily, Disp-60 tablet, R-3Normal                Details   buPROPion (WELLBUTRIN SR) 150 MG extended release tablet Take 150 mg by mouth dailyHistorical Med      fexofenadine (ALLEGRA) 180 MG tablet Take 180 mg by mouth dailyHistorical Med      tiZANidine (ZANAFLEX) 4 MG tablet Take 4 mg by mouth dailyHistorical Med      ondansetron (ZOFRAN-ODT) 4 MG disintegrating tablet Take 1 tablet by mouth 3 times daily as needed for Nausea or Vomiting, Disp-30 tablet, R-1Normal      acetaminophen (TYLENOL) 500 MG tablet Take 1 tablet by mouth 4 times daily as needed for Pain, Disp-120 tablet, R-5Normal      traMADol (ULTRAM) 50 MG tablet Take 1 tablet by mouth every 6 hours as needed for Pain for up to 7 days. Intended supply: 7 days.  Take lowest dose possible to manage pain, Disp-28 tablet, R-0Normal      ELIQUIS 2.5 MG TABS tablet TAKE 1 TABLET BY MOUTH IN THE MORNING AND 1 TABLET BEFORE BEDTIME., Disp-90 tablet, R-5Normal      QUEtiapine (SEROQUEL) 25 MG tablet Take 1-2 tablets by mouth nightly, Disp-60 tablet, R-1Normal      Erenumab-aooe (AIMOVIG) 140 MG/ML SOAJ Inject 140 mg into the skin every 30 days, Disp-1 Adjustable Dose Pre-filled Pen Syringe, R-1Normal      omeprazole (PRILOSEC) 20 MG delayed release capsule TAKE 1 CAPSULE BY MOUTH DAILY, Disp-30 capsule, R-1Normal      montelukast (SINGULAIR) 10 MG tablet TAKE 1 TABLET BY MOUTH DAILY, Disp-30 tablet, R-1Normal      albuterol sulfate HFA (PROVENTIL;VENTOLIN;PROAIR) 108 (90 Base) MCG/ACT inhaler Inhale 2 puffs into the lungs every 4 hours as needed for Wheezing or Shortness of Breath, Disp-54 g, R-5Normal      fluticasone-salmeterol (ADVAIR) 500-50 MCG/ACT AEPB diskus inhaler Inhale 1 puff into the lungs in the morning and 1 puff in the evening., Disp-60 each, R-3Normal      clotrimazole-betamethasone (LOTRISONE) 1-0.05 % cream Apply topically 2 times daily. , Disp-5 g, R-5, Normal      ASPIRIN LOW DOSE 81 MG EC tablet TAKE 1 TABLET BY MOUTH DAILY, Disp-90 tablet, R-5Normal      albuterol (PROVENTIL) (2.5 MG/3ML) 0.083% nebulizer solution TAKE 3 MLS BY NEBULIZATION EVERY 4 HOURS AS NEEDED FOR WHEEZING, Disp-360 mL, R-5Normal      insulin glargine (BASAGLAR KWIKPEN) 100 UNIT/ML injection pen Inject 8 Units into the skin 2 times daily, Disp-5 pen, R-3Normal      isosorbide mononitrate (IMDUR) 60 MG extended release tablet Take 1 tablet by mouth daily, Disp-90 tablet, R-5Normal      docusate sodium (COLACE) 100 MG capsule Take 100 mg by mouth 2 times dailyHistorical Med      atorvastatin (LIPITOR) 80 MG tablet TAKE 1 TABLET BY MOUTH NIGHTLY, Disp-90 tablet, R-5Normal      ranolazine (RANEXA) 1000 MG extended release tablet Take 1 tablet by mouth 2 times daily, Disp-60 tablet, R-8Normal      linaclotide (LINZESS) 145 MCG capsule Take 1 capsule by mouth every morning (before breakfast), Disp-30 capsule, R-5Normal      nitroGLYCERIN (NITROSTAT) 0.4 MG SL tablet Place 1 tablet under the tongue every 5 minutes as needed for Chest pain up to max of 3 total doses.  If no relief after 1 dose, call 911., Disp-25 tablet, R-3Normal             Time Spent on discharge is more than 30 minutes in the examination, evaluation, counseling and review of medications and discharge plan. Signed: CYRUS Wakefield - CNP   11/19/2022    The patient was seen and examined on day of discharge and this discharge summary is in conjunction with any daily progress note from day of discharge. Thank you Cierra Jordan MD for the opportunity to be involved in this patient's care. If you have any questions or concerns please feel free to contact me at [phone]. NOTE:  This report was transcribed using voice recognition software. Every effort was made to ensure accuracy; however, inadvertent computerized transcription errors may be present.

## 2022-11-15 NOTE — H&P
10/18/22   Rachid Billingsley MD   Erenumab-aooe (AIMOVIG) 140 MG/ML SOAJ Inject 140 mg into the skin every 30 days 10/18/22   Rachid Billingsley MD   omeprazole (PRILOSEC) 20 MG delayed release capsule TAKE 1 CAPSULE BY MOUTH DAILY 10/18/22   Gisell Urias MD   montelukast (SINGULAIR) 10 MG tablet TAKE 1 TABLET BY MOUTH DAILY 10/18/22   Gisell Urias MD   labetalol (NORMODYNE) 200 MG tablet TAKE 1 TABLET BY MOUTH 2 TIMES DAILY 10/10/22   Gisell Urias MD   albuterol sulfate HFA (PROVENTIL;VENTOLIN;PROAIR) 108 (90 Base) MCG/ACT inhaler Inhale 2 puffs into the lungs every 4 hours as needed for Wheezing or Shortness of Breath 9/28/22   Gisell Urias MD   fluticasone-salmeterol (ADVAIR) 500-50 MCG/ACT AEPB diskus inhaler Inhale 1 puff into the lungs in the morning and 1 puff in the evening. 9/28/22   Gisell Urias MD   clotrimazole-betamethasone (LOTRISONE) 1-0.05 % cream Apply topically 2 times daily.  5/25/22   Gisell Urias MD   ASPIRIN LOW DOSE 81 MG EC tablet TAKE 1 TABLET BY MOUTH DAILY 4/19/22   Gisell Urias MD   albuterol (PROVENTIL) (2.5 MG/3ML) 0.083% nebulizer solution TAKE 3 MLS BY NEBULIZATION EVERY 4 HOURS AS NEEDED FOR WHEEZING 3/14/22   Gisell Urias MD   insulin glargine (BASAGLAR KWIKPEN) 100 UNIT/ML injection pen Inject 8 Units into the skin 2 times daily 1/27/22   Gisell Urias MD   isosorbide mononitrate (IMDUR) 60 MG extended release tablet Take 1 tablet by mouth daily 1/27/22   Gisell Urias MD   docusate sodium (COLACE) 100 MG capsule Take 100 mg by mouth 2 times daily 11/29/21   Historical Provider, MD   atorvastatin (LIPITOR) 80 MG tablet TAKE 1 TABLET BY MOUTH NIGHTLY 11/2/21   Gisell Urias MD   ranolazine (RANEXA) 1000 MG extended release tablet Take 1 tablet by mouth 2 times daily 10/29/21   Sarah Modi MD   linaclotide [facility]) 145 MCG capsule Take 1 capsule by mouth every morning (before breakfast) 10/15/21   Wandy Sidhu MD   nitroGLYCERIN (NITROSTAT) 0.4 MG SL tablet Place 1 tablet under the tongue every 5 minutes as needed for Chest pain up to max of 3 total doses. If no relief after 1 dose, call 911. 12/16/20   Farrah Oconnor MD       Allergies:  Patient has no known allergies. Social History:      TOBACCO:   reports that she quit smoking about 4 years ago. Her smoking use included cigarettes. She has a 17.50 pack-year smoking history. She has never used smokeless tobacco.  ETOH:   reports no history of alcohol use. Family History:       Reviewed in detail and non contributory          Problem Relation Age of Onset    Diabetes Mother     High Cholesterol Mother     Stroke Mother     Cancer Mother     High Blood Pressure Mother     No Known Problems Paternal Grandfather         lung issues        REVIEW OF SYSTEMS:   Pertinent positives as noted in the HPI. All other systems reviewed and negative. PHYSICAL EXAM PERFORMED:    BP (!) 168/78   Pulse 85   Temp 98.7 °F (37.1 °C) (Oral)   Resp 18   Ht 5' (1.524 m)   Wt 134 lb 4.2 oz (60.9 kg)   SpO2 95%   BMI 26.22 kg/m²     General appearance:  No apparent distress, cooperative. HEENT:  Normal cephalic, atraumatic without obvious deformity. Conjunctivae/corneas clear. Neck: Supple, with full range of motion. No cervical lymphadenopathy  Respiratory:  Normal respiratory effort. Clear to auscultation, bilaterally without Rales/Wheezes/Rhonchi. Cardiovascular:  Regular rate and rhythm with normal S1/S2 without murmurs, rubs or gallops. Abdomen: Soft, non-tender, non-distended, normal bowel sounds. Musculoskeletal:  No edema noted bilaterally. No tenderness on palpation   Skin: no rash visible  Neurologic:  Neurologically intact without any focal sensory/motor deficits.   grossly non-focal.  Psychiatric:  Alert and oriented, normal mood  Peripheral Pulses: +2 palpable, equal bilaterally       Labs:     Recent Labs     11/14/22  1142   WBC 5.9   HGB 9.7*   HCT 31.1*    Recent Labs     11/14/22  1143      K 4.3      CO2 21   BUN 23*   CREATININE 1.4*   CALCIUM 9.2     No results for input(s): AST, ALT, BILIDIR, BILITOT, ALKPHOS in the last 72 hours. No results for input(s): INR in the last 72 hours. Recent Labs     11/14/22  1143 11/14/22  1506   TROPONINI <0.01 <0.01       Urinalysis:      Lab Results   Component Value Date/Time    NITRU Negative 08/29/2022 10:15 PM    WBCUA 2 08/29/2022 10:15 PM    BACTERIA 1+ 08/29/2022 10:15 PM    RBCUA 1 08/29/2022 10:15 PM    BLOODU TRACE 08/29/2022 10:15 PM    SPECGRAV 1.016 08/29/2022 10:15 PM    GLUCOSEU Negative 08/29/2022 10:15 PM    GLUCOSEU NEGATIVE 05/14/2012 03:29 PM       Radiology:       XR CHEST (2 VW)   Final Result   No acute cardiopulmonary findings                 Active Hospital Problems    Diagnosis Date Noted    Chest pain [R07.9] 08/27/2022     Priority: Medium       Patient is a 44-year-old female who presents to the hospital for chest pain, according to the patient she has been having chest pain to the left side of her chest, radiating to her left shoulder, neck, patient mention she has a history of CAD and has been having the stents however her chest pain feels like the same. Patient currently rates her chest pain is 10/10 intensity, she took 3 nitroglycerin at home without much relief which made her come to the hospital.  Otherwise denies diarrhea constipation dysuria. Patient mention she feels nauseated but denied vomiting. Denied fevers chills.     Assessment  Chest pain, rule out ACS  Diabetes mellitus  CAD status post stenting  Hypertension  Hyperlipidemia  CKD stage III, creatinine at baseline  Afib on eliquis    Plan  Monitor on cardiac telemetry  Monitor troponin  Aspirin, statin  Consult cardiology  Recent echo 8/2022 showed EF 55 to 08% grade 2 diastolic dysfunction  Insulin sliding scale  Monitor and replace electrolytes  DVT prophylaxis-on Eliquis  Resume home medications  Diet: ADULT DIET; Clear Liquid; No Caffeine  Code Status: Full Code    PT/OT Eval Status: ordered    Dispo - pending clinical improvement       Andreina Garcia MD    The note was completed using EMR and Dragon dictation system. Every effort was made to ensure accuracy; however, inadvertent computerized transcription errors may be present. Thank you Gela Bermudez MD for the opportunity to be involved in this patient's care. If you have any questions or concerns please feel free to contact me at [phone].     Andreina Garcia MD

## 2022-11-15 NOTE — CONSULTS
This is a 79-year-old patient with coronary disease admitted with recurrent chest pain. Patient always gets admitted to the hospital with recurrent chest pain. EKG as not suggestive of any acute coronary syndrome. Troponins are negative  She has noncardiac chest pain. She does not require any further cardiovascular work-up.     Piedad Wright MD

## 2022-11-21 ENCOUNTER — TELEPHONE (OUTPATIENT)
Dept: CARDIOLOGY CLINIC | Age: 67
End: 2022-11-21

## 2022-11-21 NOTE — TELEPHONE ENCOUNTER
Called and spoke to Sirisha, I let her know that Dr Wang Curiel did review her recent hospitalization and no further testing was needed at this time. She verbalized understanding.

## 2022-11-21 NOTE — TELEPHONE ENCOUNTER
Maren called in this morning, she states she was in the hospital last week and was told someone would come over to see her and explain whats going on with her, she states she was told she has a blocked artery that was in an awkward place, she would like a call explaining this. She can be reached at [phone].

## 2022-11-27 NOTE — PROGRESS NOTES
Little Avila MD  10/19/2021,   Jesse Gilbert MD    1955  Chief Complaint   Patient presents with    Chest Pain     Came to the ED with complaint of left sided chest pain started 1hr ago. pt took 3 nitro and 1 tablet of 324mg of aspirin with minimal relief hx of MI        Active Problems:    Chest pain  Resolved Problems:    * No resolved hospital problems. *     has a past medical history of Acid reflux, Anemia, Anxiety and depression, Arthritis, Asthma, Atrial fibrillation (HCC), CAD (coronary artery disease), Cerebral artery occlusion with cerebral infarction ([address] CHF (congestive heart failure) ([address] Chronic kidney disease--stage III, COPD (chronic obstructive pulmonary disease) ([address] DM2 (diabetes mellitus, type 2) ([address] Dysarthria, Fibromyalgia, Headache(784.0), Hemisensory loss, History of blood transfusion, Hyperlipidemia, Hypertension, IBS (irritable bowel syndrome), Inferior vena cava occlusion ([address] Keratitis, MI, old, Neuropathy, Superior vena cava obstruction, Temporal arteritis ([address] and Wears glasses. Past Surgical History:     has a past surgical history that includes Tunneled venous port placement; Cholecystectomy; ablation of dysrhythmic focus (1999  and 11/2020); Cataract removal (Bilateral); joint replacement (Right); Hysterectomy; Artery Biopsy (Right, 04/23/2014); Coronary angioplasty with stent (2020); Knee arthroscopy (Right); Percutaneous Transluminal Coronary Angio (10/2019); Upper gastrointestinal endoscopy (N/A, 7/6/2020); Carotid artery surgery (Left); Colonoscopy (N/A, 4/9/2021); and Colonoscopy (N/A, 10/15/2021). ED REVIEW:  Chest Pain        Came to the ED with complaint of left sided chest pain started 1hr ago. pt took 3 nitro and 1 tablet of 324mg of aspirin with minimal relief hx of MI         HPI    Claire Ortiz is a 60 y.o. female who presents with chest pain. The onset was this morning when she woke up.   It actually woke her up out of her sleep. The duration has been constant since the onset. The quality of the pain is a crushing left chest pain, with a severity of 10/10. The pain is localized in the left chest, does radiate to her back and down her left arm. Does not worsen with exertion. But at the start of onset she did have diaphoresis with nausea and vomiting. Positive shortness of breath associated with this but no cough. The context is that the symptoms started spontaneously at rest, woke her up out of her sleep. The patient took 324 mg of aspirin and 1 nitroglycerin and called the squad. While waiting for the squad she took 2 more nitroglycerin. Some minimal relief with this. Was brought to the ED for further evaluation and treatment. CURRENT STATUS:Chest pain ( likely unstable angina). Extensive history of CAD. Patient presented to emergency with acute chest pain not relieved by nitroglycerin, patient has history of multiple admissions with chest pain, had extensive history of coronary disease with multiple stents, on Ranexa for angina. Upon presentation patient's blood pressure was noted to be elevated, later normalized, blood work noted to be unremarkable, troponin negative. EKG with no new ST-T changes. Most recent stress test in June was negative.      Plan. -Trend troponin.  -Nitroglycerin as needed for pain. -Dilaudid for chest pain.  -Resume amlodipine, aspirin, atorvastatin, clopidogrel.   -Resume Ranexa.  -Resume metoprolol     Diastolic CHF. Patient is not in exacerbation. Resume home dose of torsemide 10 mg.     Atrial fibrillation. Continue metoprolol. Patient is not on anticoagulation due to GI bleed.      Diabetes mellitus. Reduced home glargine dose. Glargine 5 units with sliding scale     Depression. Continue home dose of quetiapine  Continue Cymbalta     GERD   Continue pantoprazole     DVT Prophylaxis: Lovenox/   Diet: ADULT DIET; Regular; 4 carb choices (60 gm/meal);  Low Sodium (2 gm); 1200 ml  Code Status: Full Code  PT/OT Eval Status: pending clinical stability    IMAGING-Relevant:  Impression:        1. Negative for pulmonary embolus. 2. Mild bilateral pulmonary fibrosis. 3. Chronic SVC occlusion with associated venous collateralization. MY REVIEW:  60  Female  CARDIAC SIGNIFICANT HISTORY  Of obstruction and stenting  NEW ANGINA  Unstable situation 10/10 pain    Cardiology to see  Severe Anemia 9.8  Bradycardia  Hyperglycemia  UNUSUAL to have SVC CHRONIC OCCLUSION- ? Workup previously? The Utilization Review Committee members, including its physician members, have reviewed this case and agree that this patient does meet evidence based criteria for inpatient services,  to ADMISSION =\"admit as inpatient\"  Medical care will continue to be provided by Gladys Pacheco MD, and DR Silvano Smith who concurs with this decision and has documented his/her concurrence in the patient's medical record. Ben Goncalves MD, [address], [facility] [address], [address] [facility]    Cell [phone]  Office [phone]  10/19/2021  12:13 PM None

## 2022-11-28 ENCOUNTER — TELEPHONE (OUTPATIENT)
Dept: CARDIOLOGY | Age: 67
End: 2022-11-28

## 2022-11-28 DIAGNOSIS — I10 ESSENTIAL HYPERTENSION: ICD-10-CM

## 2022-11-28 RX ORDER — LABETALOL 200 MG/1
200 TABLET, FILM COATED ORAL 2 TIMES DAILY
Qty: 60 TABLET | Refills: 3 | Status: SHIPPED | OUTPATIENT
Start: 2022-11-28 | End: 2022-12-02 | Stop reason: SDUPTHER

## 2022-11-28 NOTE — TELEPHONE ENCOUNTER
Spoke to patient  Episodes of AF lasting 5 minutes  Self terminating but \"feels them more\"  BP has been labile  Her labetalol was recently decreased at discharge from hospitalization.     Instructed pt to go back to previous dosage of labetalol (200 mg PO daily)  Continue eliquis    Instructed pt to call back in 2-3 days with update        Regarding: FW: AFib  She is followed by Dr. Jewel Ni and at [facility].  ----- Message -----  From: Kalani Gillis MA  Sent: 11/28/2022  10:03 AM EST  To: CYRUS Lane CNP  Subject: AFib                                             ----- Message from Kalani Gillis MA sent at 11/28/2022 10:03 AM EST -----       ----- Message from Paulino Brown to CYRUS Lane CNP sent at 11/28/2022  9:24 AM -----   Having a fib again not often but it's worsen when it happens

## 2022-11-29 ENCOUNTER — PATIENT MESSAGE (OUTPATIENT)
Dept: PRIMARY CARE CLINIC | Age: 67
End: 2022-11-29

## 2022-11-29 NOTE — TELEPHONE ENCOUNTER
From: Rosalba Vidal  To: Dr. Camryn Mauricio: 11/29/2022 10:53 AM EST  Subject: Pain     Pain Management  want me to get an referral because he can't see me before I could ask referral for.  what the lady hung up

## 2022-12-02 ENCOUNTER — OFFICE VISIT (OUTPATIENT)
Dept: PRIMARY CARE CLINIC | Age: 67
End: 2022-12-02
Payer: COMMERCIAL

## 2022-12-02 VITALS
BODY MASS INDEX: 25.91 KG/M2 | TEMPERATURE: 97 F | HEART RATE: 86 BPM | DIASTOLIC BLOOD PRESSURE: 80 MMHG | SYSTOLIC BLOOD PRESSURE: 160 MMHG | WEIGHT: 132 LBS | OXYGEN SATURATION: 100 % | HEIGHT: 60 IN

## 2022-12-02 DIAGNOSIS — I10 ESSENTIAL HYPERTENSION: ICD-10-CM

## 2022-12-02 DIAGNOSIS — Z79.4 TYPE 2 DIABETES MELLITUS WITH OTHER CIRCULATORY COMPLICATION, WITH LONG-TERM CURRENT USE OF INSULIN (HCC): ICD-10-CM

## 2022-12-02 DIAGNOSIS — E11.59 TYPE 2 DIABETES MELLITUS WITH OTHER CIRCULATORY COMPLICATION, WITH LONG-TERM CURRENT USE OF INSULIN (HCC): ICD-10-CM

## 2022-12-02 DIAGNOSIS — R52 PAIN: Primary | ICD-10-CM

## 2022-12-02 DIAGNOSIS — R52 PAIN: ICD-10-CM

## 2022-12-02 PROBLEM — I25.10 ATHEROSCLEROTIC HEART DISEASE OF NATIVE CORONARY ARTERY WITHOUT ANGINA PECTORIS: Status: ACTIVE | Noted: 2022-11-30

## 2022-12-02 PROBLEM — I50.20 UNSPECIFIED SYSTOLIC (CONGESTIVE) HEART FAILURE (HCC): Status: ACTIVE | Noted: 2022-11-30

## 2022-12-02 PROBLEM — D64.9 ANEMIA, UNSPECIFIED: Status: ACTIVE | Noted: 2022-11-30

## 2022-12-02 PROCEDURE — 1090F PRES/ABSN URINE INCON ASSESS: CPT | Performed by: INTERNAL MEDICINE

## 2022-12-02 PROCEDURE — G8400 PT W/DXA NO RESULTS DOC: HCPCS | Performed by: INTERNAL MEDICINE

## 2022-12-02 PROCEDURE — G8427 DOCREV CUR MEDS BY ELIG CLIN: HCPCS | Performed by: INTERNAL MEDICINE

## 2022-12-02 PROCEDURE — 1123F ACP DISCUSS/DSCN MKR DOCD: CPT | Performed by: INTERNAL MEDICINE

## 2022-12-02 PROCEDURE — 2022F DILAT RTA XM EVC RTNOPTHY: CPT | Performed by: INTERNAL MEDICINE

## 2022-12-02 PROCEDURE — G8417 CALC BMI ABV UP PARAM F/U: HCPCS | Performed by: INTERNAL MEDICINE

## 2022-12-02 PROCEDURE — 1036F TOBACCO NON-USER: CPT | Performed by: INTERNAL MEDICINE

## 2022-12-02 PROCEDURE — 3078F DIAST BP <80 MM HG: CPT | Performed by: INTERNAL MEDICINE

## 2022-12-02 PROCEDURE — 3046F HEMOGLOBIN A1C LEVEL >9.0%: CPT | Performed by: INTERNAL MEDICINE

## 2022-12-02 PROCEDURE — G8484 FLU IMMUNIZE NO ADMIN: HCPCS | Performed by: INTERNAL MEDICINE

## 2022-12-02 PROCEDURE — 3017F COLORECTAL CA SCREEN DOC REV: CPT | Performed by: INTERNAL MEDICINE

## 2022-12-02 PROCEDURE — 99214 OFFICE O/P EST MOD 30 MIN: CPT | Performed by: INTERNAL MEDICINE

## 2022-12-02 PROCEDURE — 3074F SYST BP LT 130 MM HG: CPT | Performed by: INTERNAL MEDICINE

## 2022-12-02 RX ORDER — TRAMADOL HYDROCHLORIDE 50 MG/1
50 TABLET ORAL 2 TIMES DAILY PRN
Qty: 30 TABLET | Refills: 0 | Status: SHIPPED | OUTPATIENT
Start: 2022-12-02 | End: 2023-01-01

## 2022-12-02 RX ORDER — ACETAMINOPHEN 500 MG
500 TABLET ORAL 4 TIMES DAILY PRN
Qty: 120 TABLET | Refills: 5 | Status: SHIPPED | OUTPATIENT
Start: 2022-12-02

## 2022-12-02 RX ORDER — AMLODIPINE BESYLATE 10 MG/1
10 TABLET ORAL DAILY
Qty: 90 TABLET | Refills: 1 | Status: SHIPPED | OUTPATIENT
Start: 2022-12-02

## 2022-12-02 RX ORDER — ISOSORBIDE MONONITRATE 60 MG/1
60 TABLET, EXTENDED RELEASE ORAL DAILY
Qty: 90 TABLET | Refills: 5 | Status: SHIPPED | OUTPATIENT
Start: 2022-12-02

## 2022-12-02 RX ORDER — PREGABALIN 50 MG/1
50 CAPSULE ORAL 3 TIMES DAILY
Qty: 90 CAPSULE | Refills: 1 | Status: SHIPPED | OUTPATIENT
Start: 2022-12-02 | End: 2023-01-01

## 2022-12-02 RX ORDER — LABETALOL 200 MG/1
200 TABLET, FILM COATED ORAL 2 TIMES DAILY
Qty: 60 TABLET | Refills: 3 | Status: SHIPPED | OUTPATIENT
Start: 2022-12-02

## 2022-12-02 ASSESSMENT — ENCOUNTER SYMPTOMS
BLOOD IN STOOL: 0
NAUSEA: 0
CHEST TIGHTNESS: 0
CONSTIPATION: 0
BACK PAIN: 1
VOMITING: 0
ABDOMINAL DISTENTION: 0

## 2022-12-02 NOTE — PROGRESS NOTES
Subjective:      Patient ID: Amanda Gilbert is a 78 y.o. female. 12/2/2022 Patient presents with:  Pain: Unable to get in with pain management- still looking for a dr              Last seen  Star Dumontbartolome Oskar 1237 11/8/2022 Patient presents with:  Leg Swelling: Both legs swelling- unable to get in due to transportation issues  Nausea: In morning when waking up and sometimes though the day    Amanda Gilbert is a 78year old female with a past medical history of coronary artery disease status post stent placement, atrial fibrillation on Eliquis watchman implant in situ, hypertension, CHF, CKD, type 2 diabetes, COPD, fibromyalgia and chronic pain syndrome who presents with a chief complaint of chest pain and nausea. Patient did have a stress test performed at [facility] on August 28 that came back negative for evidence of ischemic changes. Troponins neg . Stress Test neg 8/22 . Discharged gome          Last seen  Star William Ebony Schmitt 1237 8/4/2022 Patient presents with:  Diabetes  Hypertension  Headache  Stress:                6/22/2022 Patient presents with: [facility], 6/13-6/14/2022, headaches  CT Head and MRI Head done   Headache  Hypertension: states she is taking meds . 5/25/2022 Patient presents with:  Medicare AWV               1/27/2022 Patient presents with: Follow-Up from Hospital: [facility]-1/9/2022 - 1/13/2022 Chest pain, [place]    . Chest pain, ,, no further work-up recommended per cardiology    Diabetes mellitus, sliding scale    COPD, no acute exacerbation  . A. fib, controlled. Echo noted, discussed with Dr. Prasanth Lee, at this time we will keep on aspirin and Plavix, she will follow-up with him as outpatient. To discuss watchman device  Renal insuff  monitor closely, improved creatinine to 1.4 this morning.     Anemia,  Chronic    Peripheral vascular disease with discoloration of second left toe, vascular surgery consulted, Doppler noted with no significant occlusive disease, echo ordered per vascular . 10/1/2020               1/14/2020          9/24/19     8/31/19 !!!           8/29/19      hypertension, diabetes, hyperlipidemia, fibromyalgia, COPD, CKD, CHF and CAD           Review of Systems   Constitutional:  Negative for activity change, fatigue and fever. Flu vac 10/21     tdap 10/16      pneumovac    5/22 ; 10/13     Shingrex    covid vac 3/21  #2      HENT:          No teeth     No dentures    Eyes:         Last Eye Ex 3/20   Respiratory:  Negative for chest tightness. Shortness of breath: baseline. Getting urges to smoke again ! Known COPD   Cardiovascular:         Known CAD with Stents . Atrial Fibrillation , S/P Watchman device 5/22   Gastrointestinal:  Negative for abdominal distention, blood in stool, constipation, nausea and vomiting. Upper / lower endopscopy 4/19       No Fh of ca colon . Genitourinary:  Negative for dysuria, frequency, menstrual problem, urgency and vaginal discharge. Hysterectomy   Mammogram 1/16    Musculoskeletal:  Positive for arthralgias, back pain, gait problem and myalgias. Pain Disorder was with  Pain Management    Neurological:  Positive for weakness. Light-headedness: off and on . Headaches: Chronic . off and on . Hematological:            Did see Hematology for anemia . Bone Marrow exam Nl    Psychiatric/Behavioral:  Positive for dysphoric mood (Remembers her son's murder > 20 yrs ago). Negative for behavioral problems and sleep disturbance. The patient is not nervous/anxious. Objective:   Physical Exam  Vitals reviewed. Constitutional:       General: She is not in acute distress. Appearance: She is ill-appearing. Comments: Vitals as noted 10/18/22       Cardiovascular:      Rate and Rhythm: Regular rhythm. Pulmonary:      Breath sounds: Normal breath sounds.    Musculoskeletal:      Comments:        Neurological:      Mental Status: She is oriented to person, place, and time. Psychiatric:         Attention and Perception: Attention normal.         Mood and Affect: Affect is tearful. Assessment:       Maren was seen today for pain. Diagnoses and all orders for this visit:    Pain    due to Fibromyalgia . starting Lyrica . Got Ultram 11//22   -     Drug Panel-PM-HI Res-UR Interp-A; Future  -     traMADol (ULTRAM) 50 MG tablet; Take 1 tablet by mouth 2 times daily as needed for Pain for up to 30 days. Intended supply: 7 days. Take lowest dose possible to manage pain  -     acetaminophen (TYLENOL) 500 MG tablet; Take 1 tablet by mouth 4 times daily as needed for Pain  -     pregabalin (LYRICA) 50 MG capsule; Take 1 capsule by mouth 3 times daily for 30 days. Essential hypertension  BP up . Must take all meds reg   -     labetalol (NORMODYNE) 200 MG tablet; Take 1 tablet by mouth 2 times daily  -     isosorbide mononitrate (IMDUR) 60 MG extended release tablet; Take 1 tablet by mouth daily  -     amLODIPine (NORVASC) 10 MG tablet; Take 1 tablet by mouth daily    -     isosorbide mononitrate (IMDUR) 60 MG extended release tablet; Take 1 tablet by mouth daily                  Pain  fall out with Dr Rio Pollack . Needs to find new Dr for chronic pain management   stop duloxetine   -   Stage 3b chronic kidney disease ([facility] [address]  advised to f/u with nephrology               Headaches . Nl MRI/ MRA brain . Seen Neurology for tiny meningioma 6/22 . Watchful waiting recommended . Off Ubrelvi . On  aimovig now  -            Type 2 diabetes mellitus with other circulatory complication, with long-term current use of insulin ([facility])  last  A1C 7.4    . Rechk again . Eye exam needed     -     insulin glargine (BASAGLAR KWIKPEN) 100 UNIT/ML injection pen;  Inject 8 Units into the skin 2 times daily  -        Neuropathy        Coronary artery disease involving native coronary artery of native heart without angina pectoris  -        Diabetic gastroparesis ([facility] [address]

## 2022-12-05 ENCOUNTER — OFFICE VISIT (OUTPATIENT)
Dept: CARDIOLOGY CLINIC | Age: 67
End: 2022-12-05
Payer: COMMERCIAL

## 2022-12-05 ENCOUNTER — PATIENT MESSAGE (OUTPATIENT)
Dept: PRIMARY CARE CLINIC | Age: 67
End: 2022-12-05

## 2022-12-05 ENCOUNTER — TELEPHONE (OUTPATIENT)
Dept: CARDIOLOGY CLINIC | Age: 67
End: 2022-12-05

## 2022-12-05 VITALS
BODY MASS INDEX: 25.72 KG/M2 | DIASTOLIC BLOOD PRESSURE: 78 MMHG | SYSTOLIC BLOOD PRESSURE: 140 MMHG | WEIGHT: 131 LBS | HEART RATE: 78 BPM | HEIGHT: 60 IN | OXYGEN SATURATION: 98 %

## 2022-12-05 DIAGNOSIS — Z79.01 CURRENT USE OF ANTICOAGULANT THERAPY: ICD-10-CM

## 2022-12-05 DIAGNOSIS — I25.118 CORONARY ARTERY DISEASE OF NATIVE ARTERY OF NATIVE HEART WITH STABLE ANGINA PECTORIS (HCC): ICD-10-CM

## 2022-12-05 DIAGNOSIS — I10 ESSENTIAL HYPERTENSION: ICD-10-CM

## 2022-12-05 DIAGNOSIS — I50.32 CHRONIC DIASTOLIC HEART FAILURE (HCC): ICD-10-CM

## 2022-12-05 DIAGNOSIS — I48.0 PAROXYSMAL A-FIB (HCC): Primary | ICD-10-CM

## 2022-12-05 PROCEDURE — G8400 PT W/DXA NO RESULTS DOC: HCPCS | Performed by: INTERNAL MEDICINE

## 2022-12-05 PROCEDURE — 93270 REMOTE 30 DAY ECG REV/REPORT: CPT | Performed by: INTERNAL MEDICINE

## 2022-12-05 PROCEDURE — 1090F PRES/ABSN URINE INCON ASSESS: CPT | Performed by: INTERNAL MEDICINE

## 2022-12-05 PROCEDURE — 3074F SYST BP LT 130 MM HG: CPT | Performed by: INTERNAL MEDICINE

## 2022-12-05 PROCEDURE — 3017F COLORECTAL CA SCREEN DOC REV: CPT | Performed by: INTERNAL MEDICINE

## 2022-12-05 PROCEDURE — 93000 ELECTROCARDIOGRAM COMPLETE: CPT | Performed by: INTERNAL MEDICINE

## 2022-12-05 PROCEDURE — 3078F DIAST BP <80 MM HG: CPT | Performed by: INTERNAL MEDICINE

## 2022-12-05 PROCEDURE — 1036F TOBACCO NON-USER: CPT | Performed by: INTERNAL MEDICINE

## 2022-12-05 PROCEDURE — G8427 DOCREV CUR MEDS BY ELIG CLIN: HCPCS | Performed by: INTERNAL MEDICINE

## 2022-12-05 PROCEDURE — 1123F ACP DISCUSS/DSCN MKR DOCD: CPT | Performed by: INTERNAL MEDICINE

## 2022-12-05 PROCEDURE — G8417 CALC BMI ABV UP PARAM F/U: HCPCS | Performed by: INTERNAL MEDICINE

## 2022-12-05 PROCEDURE — G8484 FLU IMMUNIZE NO ADMIN: HCPCS | Performed by: INTERNAL MEDICINE

## 2022-12-05 PROCEDURE — 99205 OFFICE O/P NEW HI 60 MIN: CPT | Performed by: INTERNAL MEDICINE

## 2022-12-05 NOTE — LETTER
[facility]  [id number]  Phone: [phone]  Fax: [phone]    Monalisa Webb MD        December 13, 2022     Patient: Gabriel Gibbons   YOB: 1955   Date of Visit: 12/5/2022       To Whom It May Concern: It is my medical opinion that Gabriel Gibbons have an aid that can stay with her at least 24 hours a day. This is needed to assist with daily ADL's due to her chronic pain syndrome. It can be very difficult to do things around the house independently without help. If you have any questions or concerns, please don't hesitate to call.     Sincerely,        Monalisa Webb MD

## 2022-12-05 NOTE — PROGRESS NOTES
Cardiac Electrophysiology Consultation   Date: 12/5/2022  Reason for Consultation: Atrial fibrillation   Consult Requesting Physician: Asia Koroma MD  Primary Care Physician: Edgardo Sears MD    Chief Complaint:   Chief Complaint   Patient presents with    New Patient     Afib- chest pains this morning. KELLER when walking. Stomach swelling. HPI: Aruna Garcia is a 78 y.o. patient with a history of coronary artery disease, diastolic heart failure, hypertension, DVT (for which she is on Eliquis 2.5mg po BID), hyperlipidemia, CVA, DM, paroxysmal atrial fibrillation, implantation of Watchman JOANIE closure on 05/16/2022. Reported history of ablation in 1999 and 11/2000 but no procedure report is available for review. Today, she presents to office for evaluation of atrial fibrillation. She reports symptoms of fatigue and shortness of breath when in atrial fibrillation and stated she had an episode this morning. She states she was diagnosed with atrial fibrillation back in 1999. She reports a history of 2 previous ablations by Dr. Belkis Stiles.      Past Medical History:   Diagnosis Date    Acid reflux     Anemia     Anxiety and depression     Arthritis     Asthma     Atrial fibrillation (HCC)     CAD (coronary artery disease) 12/3/2012    Cerebral artery occlusion with cerebral infarction [facility])     TIA\"\"S--right sided weakness & headache    CHF (congestive heart failure) (formerly [facility])     Chronic kidney disease--stage III     40% kidney function    COPD (chronic obstructive pulmonary disease) (formerly [facility])     DM2 (diabetes mellitus, type 2) ([facility]     Dysarthria     Fibromyalgia 6/7/2016    Headache(784.0) 2/19/2013    Hemisensory loss     History of blood transfusion 11/2020    pt denies having transfusion reaction    Hyperlipidemia     Hypertension     IBS (irritable bowel syndrome)     Inferior vena cava occlusion (HCC)     Keratitis     Meningioma (HCC)     MI, old     Neuropathy     Superior vena cava obstruction     Temporal arteritis ([address] 2/24/2014    Wears glasses         Past Surgical History:   Procedure Laterality Date    ABLATION OF DYSRHYTHMIC FOCUS  1999  and 11/2020    times 2    ARTERY BIOPSY Right 04/23/2014    RIGHT TEMPORAL ARTERY BIOPSY    CAROTID ARTERY SURGERY Left     clean up per pt    CATARACT REMOVAL Bilateral     CHOLECYSTECTOMY      COLONOSCOPY N/A 4/9/2021    COLONOSCOPY WITH BIOPSY performed by Marjorie Dotson MD at [address] [place] [address] N/A 10/15/2021    COLONOSCOPY performed by Marjorie Dotson MD at [facility] (CERVIX STATUS UNKNOWN)      JOINT REPLACEMENT Right     KNEE ARTHROSCOPY Right     PTCA  10/2019    LAD and RCA inrtervention    TUNNELED VENOUS PORT PLACEMENT      left thigh. SMART PORT-----Removed--total of 4 port placement and removal    UPPER GASTROINTESTINAL ENDOSCOPY N/A 7/6/2020    EGD DIAGNOSTIC ONLY performed by Oz Sanchez MD at [facility]:  No Known Allergies    Medication:   Prior to Admission medications    Medication Sig Start Date End Date Taking? Authorizing Provider   traMADol (ULTRAM) 50 MG tablet Take 1 tablet by mouth 2 times daily as needed for Pain for up to 30 days. Intended supply: 7 days. Take lowest dose possible to manage pain 12/2/22 1/1/23 Yes Dante Valdez MD   acetaminophen (TYLENOL) 500 MG tablet Take 1 tablet by mouth 4 times daily as needed for Pain 12/2/22  Yes Dante Valdez MD   labetalol (NORMODYNE) 200 MG tablet Take 1 tablet by mouth 2 times daily 12/2/22  Yes Dante Valdez MD   isosorbide mononitrate (IMDUR) 60 MG extended release tablet Take 1 tablet by mouth daily 12/2/22  Yes Dante Valdez MD   amLODIPine (NORVASC) 10 MG tablet Take 1 tablet by mouth daily 12/2/22  Yes Dante Valdez MD   pregabalin (LYRICA) 50 MG capsule Take 1 capsule by mouth 3 times daily for 30 days.  12/2/22 1/1/23 Yes Dante Valdez MD   buPROPion [facility] SR) 150 MG extended release tablet Take 150 mg by mouth daily 11/14/22  Yes Historical Provider, MD   fexofenadine (ALLEGRA) 180 MG tablet Take 180 mg by mouth daily 5/4/22  Yes Historical Provider, MD   tiZANidine (ZANAFLEX) 4 MG tablet Take 4 mg by mouth daily 11/14/22  Yes Historical Provider, MD   ondansetron (ZOFRAN-ODT) 4 MG disintegrating tablet Take 1 tablet by mouth 3 times daily as needed for Nausea or Vomiting 11/8/22  Yes Jam Marie MD   ELIQUIS 2.5 MG TABS tablet TAKE 1 TABLET BY MOUTH IN THE MORNING AND 1 TABLET BEFORE BEDTIME. 11/2/22  Yes Fe Gómez, CYRUS - CNP   QUEtiapine (SEROQUEL) 25 MG tablet Take 1-2 tablets by mouth nightly 10/18/22  Yes Soraya Tolentino MD   Erenumab-aooe (AIMOVIG) 140 MG/ML SOAJ Inject 140 mg into the skin every 30 days 10/18/22  Yes Soraya Tolentino MD   omeprazole (PRILOSEC) 20 MG delayed release capsule TAKE 1 CAPSULE BY MOUTH DAILY 10/18/22  Yes Jam Marie MD   montelukast (SINGULAIR) 10 MG tablet TAKE 1 TABLET BY MOUTH DAILY 10/18/22  Yes Jam Marie MD   albuterol sulfate HFA (PROVENTIL;VENTOLIN;PROAIR) 108 (90 Base) MCG/ACT inhaler Inhale 2 puffs into the lungs every 4 hours as needed for Wheezing or Shortness of Breath 9/28/22  Yes Jam Marie MD   fluticasone-salmeterol (ADVAIR) 500-50 MCG/ACT AEPB diskus inhaler Inhale 1 puff into the lungs in the morning and 1 puff in the evening. 9/28/22  Yes Jam Marie MD   clotrimazole-betamethasone (LOTRISONE) 1-0.05 % cream Apply topically 2 times daily.  5/25/22  Yes Jam Marie MD   ASPIRIN LOW DOSE 81 MG EC tablet TAKE 1 TABLET BY MOUTH DAILY 4/19/22  Yes Jam Marie MD   albuterol (PROVENTIL) (2.5 MG/3ML) 0.083% nebulizer solution TAKE 3 MLS BY NEBULIZATION EVERY 4 HOURS AS NEEDED FOR WHEEZING 3/14/22  Yes Jam Marie MD   insulin glargine (BASAGLAR KWIKPEN) 100 UNIT/ML injection pen Inject 8 Units into the skin 2 times daily 1/27/22  Yes Jam Marie MD   docusate sodium (COLACE) 100 MG capsule Take 100 mg by mouth 2 times daily 11/29/21  Yes Vania Baca MD   atorvastatin (LIPITOR) 80 MG tablet TAKE 1 TABLET BY MOUTH NIGHTLY 11/2/21  Yes Alexandro Platt MD   ranolazine (RANEXA) 1000 MG extended release tablet Take 1 tablet by mouth 2 times daily 10/29/21  Yes Darion Hernandez MD   linaclotide Riky Azar) 145 MCG capsule Take 1 capsule by mouth every morning (before breakfast) 10/15/21  Yes Aristides Cohen MD   nitroGLYCERIN (NITROSTAT) 0.4 MG SL tablet Place 1 tablet under the tongue every 5 minutes as needed for Chest pain up to max of 3 total doses. If no relief after 1 dose, call 911. 12/16/20  Yes Alexandro Platt MD       Social History:   reports that she quit smoking about 4 years ago. Her smoking use included cigarettes. She has a 17.50 pack-year smoking history. She has never used smokeless tobacco. She reports that she does not drink alcohol and does not use drugs. Family History:  family history includes Cancer in her mother; Diabetes in her mother; High Blood Pressure in her mother; High Cholesterol in her mother; No Known Problems in her paternal grandfather; Stroke in her mother. Reviewed. Denies family history of sudden cardiac death, arrhythmia, premature CAD    Review of System:    General ROS: negative for - chills, fever   Psychological ROS: negative for - anxiety or depression  Ophthalmic ROS: negative for - eye pain or loss of vision  ENT ROS: negative for - epistaxis, headaches, nasal discharge, sore throat   Allergy and Immunology ROS: negative for - hives, nasal congestion   Hematological and Lymphatic ROS: negative for - bleeding problems, blood clots, bruising or jaundice  Endocrine ROS: negative for - skin changes, temperature intolerance or unexpected weight changes  Respiratory ROS: negative for - cough, hemoptysis, pleuritic pain, SOB, sputum changes or wheezing  Cardiovascular ROS: Per HPI.    Gastrointestinal ROS: negative for - abdominal pain, blood in stools, diarrhea, hematemesis, melena, nausea/vomiting or swallowing difficulty/pain  Genito-Urinary ROS: negative for - dysuria or incontinence  Musculoskeletal ROS: negative for - joint swelling or muscle pain  Neurological ROS: negative for - confusion, dizziness, gait disturbance, headaches, numbness/tingling, seizures, speech problems, tremors, visual changes or weakness  Dermatological ROS: negative for - rash    Physical Examination:  Vitals:    12/05/22 0838   BP: (!) 140/78   Pulse:    SpO2:        Constitutional: Oriented. No distress. Head: Normocephalic and atraumatic. Mouth/Throat: Oropharynx is clear and moist.   Eyes: Conjunctivae normal. EOM are normal.   Neck: Normal range of motion. Neck supple. No rigidity. No JVD present. Cardiovascular: Normal rate, regular rhythm, S1&S2 and intact distal pulses. Pulmonary/Chest: Bilateral respiratory sounds. No wheezes. No rhonchi. Abdominal: Soft. Bowel sounds present. No distension, No tenderness. Musculoskeletal: No tenderness. No edema    Lymphadenopathy: Has no cervical adenopathy. Neurological: Alert and oriented. Cranial nerve appears intact, No Gross deficit   Skin: Skin is warm and dry. No rash noted. Psychiatric: Has a normal mood, affect and behavior     Labs:  Reviewed. ECG: Sinus rhythm with diminutive P waves, old anteroseptal infarct, nonspecific ST depression   +   Diffuse nonspecific T-abnormality  -Nondiagnostic. with v-rate of 78 bpm with QRS duration 84 ms. No pathologic Q waves, ventricular pre-excitation, or QT prolongation. Studies:   1. Event monitor:       2. Echo: 08/29/2022  Summary  Normal left ventricle size, wall thickness and systolic function with an estimated ejection fraction of 55-60%. Grade II diastolic dysfunction with elevated LV filling pressures. Normal right ventricular size and function.    The left atrium is moderately dilated  Echo: 1/11/2022  Normal LV size and wall motion. EF is   60%. Grade II diastolic dysfunction  with elevated LV filling pressures. The left atrium is mildly dilated. Normal right ventricular size and function  Trivial Mitral and Tricuspid regurgitation. 3. Stress Test:  03/11/2021   Summary    Pharmacological Stress/MPI Results:        1. Technically a satisfactory study. 2. No evidence of Ischemia by Myocardial Perfusion Imaging. 3. Gated Study shows normal LV size and Systolic function; EF is 70 %. 4. Cath: 12/21/2018  ANGIOGRAPHY FINDINGS:  1. Left main comes from the left coronary cusp. YAKELIN 3 flow. No  stenosis. 2.  Left anterior descending artery is patent. Distal left anterior  descending artery has a 70% stenosis. 3.  Left circumflex has patent stents. No significant stenosis. 4.  Right coronary artery has patent stents. No significant stenosis. 5.  LV ejection fraction 60%. SUMMARY:  Patent coronary artery stents. Distal left anterior descending artery 70% stenosis. I independently reviewed and interpreted the ECG, MCOT, echocardiogram, stress test, and coronary angiography/PCI results and used them for my plan of care. Procedures:  1. Reported history of ablation in 199 and 11/2000. Assessment/Plan:     Paroxysmal atrial fibrillation  - Symptomatic with fatigue and shortness of breath. - First diagnosed in [address] [place] [address], with history of 2 previous ablation in 1999 and 11/2001. - She has a [id number] Score 8 (CHF, HTN, DM, CAD, AGE, GENDER, CVA). - s/p watchman 05/16/2022. - She is currently on Eliquis 2.5 mg BID (which is for DVT prophylaxis) and is s/p Watchman implantation for afib thromboembolic prophylaxis. - Tolerating well no signs or symptoms of abnormal bruising or bleeding. We educated the patient that atrial fibrillation is a worsening and progressive disease, with more frequent episodes that will ensue.  Subsequent episodes usually become more sustained to the extent that many individuals would then develop persistent atrial fibrillation. Once persistence is reached, permanent atrial fibrillation is inevitable. We also discussed the fact that atrial fibrillation is associated with stroke, including life-threatening stroke, and therefore oral anticoagulation is warranted depending on the patient's [id number] score. We discussed different management options for atrial fibrillation including their risks and benefits. These options include use of cardioversion (mainly for persisting atrial fibrillation or atrial flutter) which provides an effective immediate therapy with success rates of 75% or higher, but it provides no short nor long term efficacy. Anti-arrhythmic medications provide a very effective short term therapy, but even with our most potent anti-arrhythmic medication there is limited long term efficacy (clinical studies have shown that 40% of patients remain atrial fibrillation-free after 4 years of follow-up after starting one of the more powerful anti-arrhythmic medication (amiodarone), and, if extrapolated, may have further diminishing success as time goes on). Atrial fibrillation ablation is a potentially curative therapy with very reasonable success rate after a first time procedure and with improving success rates with subsequent procedures. The risks, benefits and alternatives of the atrial fibrillation ablation procedure were discussed with the patient. The risks including, but not limited to, bleeding, infection, radiation exposure, injury to vascular, cardiac and surrounding structures (including pneumothorax), stroke, cardiac perforation, tamponade, need for emergent open heart surgery, need for pacemaker implantation, injury to the phrenic nerve, injury to the esophagus, myocardial infarction and death were discussed in detail.  The patient was also counseled at length about the risks of juaquin Covid-19 in the susan-operative and post-operative states including the recovery window of their procedure. The patient was made aware that juaquin Covid-19 after a surgical procedure may worsen their prognosis for recovering from the virus and lend to a higher morbidity and or mortality risk. The patient was given the option of postponing their procedure. The patient was also presented reasonable alternatives to the proposed care, treatment, and services. The discussion I have had with the patient encompassed risks, benefits, and side effects related to the alternatives and the risks related to not receiving the proposed care, treatment and services. We will need to confirm diagnosis of atrial fibrillation prior to proceeding with an ablation. Patient is interested in undergoing ablation once diagnosis is confirmed. We will issue a 15  day monitor to assess for atrial fibrillation / atrial flutter and if not seen recommend undergoing implantation of ILR which will allow for long term monitoring for reoccurrence of atrial fibrillation. We discussed implanting an implantable loop recorder for long-term cardiac dysrhythmia monitoring in order to evaluate for reoccurrence of  atrial fibrillation/atrial flutter. The benefits and risks of implanting an implantable loop recorder has been discussed with the patient. The risks including, but not limited to, the risks of vascular injury, bleeding, infection, device malfunction, injury to cardiac and surrounding structures (including pneumothorax), stroke, myocardial infarction and death were discussed in detail. The patient was also counseled at length about the risks of juaquin Covid-19 in the susan-operative and post-operative states including the recovery window of their procedure. The patient was made aware that juaquin Covid-19 after a surgical procedure may worsen their prognosis for recovering from the virus and lend to a higher morbidity and or mortality risk.  The patient was given the option of postponing their procedure. The patient was also presented reasonable alternatives to the proposed care, treatment, and services. The discussion I have had with the patient encompassed risks, benefits, and side effects related to the alternatives and the risks related to not receiving the proposed care, treatment and services. The patient is aware of and understands the risks and is willing to proceed with the ILR implantation.   - We will schedule it only if the 15 day monitor is unrevealing for atrial fibrillation.    -Encouraged to watch \"That Sugar Film\" which can be found on [employer] and other [employer] services, which discusses the negative impact of processed sugar has on the body. - Patient educated to eat a diet which includes organic fruits, vegetables and meats.    - Much time was spent counseling the patient on healthier lifestyle, following a low sodium diet <2,000 mg of sodium a day and dramatically decreasing the intake of processed sugar < 24 grams for female. History of IVC filter occulusion  - s/p successful balloon angioplasty 13/98/9694    Diastolic heart failure  - Grade II diastolic dysfunction with elevated LV filling pressures per Echo 08/29/2022.  - Continue current medical management. - Euvolemic on today's exam.    Coronary artery disease with stable angina  - Continue with medical management and risk factor modification including aspirin, statin, and beta blocker. - Continue anti anginal for angina. Essential hypertension  - Stable. - Continue current medical management. Follow ups: We will call with the results of the monitor and if atrial fibrillation is seen proceed with scheduling ablation, if atrial fibrillation is not seen we will proceed with implantation of ILR. Continue routine follow up with Tia Mason MD.      Thank you for allowing me to participate in the care of Roslyn Bedoya. All questions and concerns were addressed to the patient/family.  Alternatives to my treatment were discussed. This note was scribed in the presence of Dr. Alaina Puckett MD by Tesha Xiong, YOLETTE. The scribe's documentation has been prepared under my direction and personally reviewed by me in its entirety. I confirm that the note above accurately reflects all work, physical examination, the discussion of treatments and procedures, and medical decision making performed by me.     Alaina Puckett MD, MS, [facility]  Cardiac Electrophysiology  [address]  [address] [facility] [address] [place] [phone]  [phone]

## 2022-12-06 NOTE — TELEPHONE ENCOUNTER
From: Kim Koehler  To: Dr. Andrews Busby: 2022 7:25 PM EST  Subject: Letter     Dear Dr Amy Dakin putting in for a live in aide I have the first letter that you wrote for me but I need a more eccentric letter you don't have to put my diagnosis thank you so much my address is [facility] and again thank you again

## 2022-12-07 ENCOUNTER — PATIENT MESSAGE (OUTPATIENT)
Dept: PRIMARY CARE CLINIC | Age: 67
End: 2022-12-07

## 2022-12-07 LAB
6-ACETYLMORPHINE: NOT DETECTED
7-AMINOCLONAZEPAM: NOT DETECTED
ALPHA-OH-ALPRAZOLAM: NOT DETECTED
ALPHA-OH-MIDAZOLAM, URINE: NOT DETECTED
ALPRAZOLAM: NOT DETECTED
AMPHETAMINE: NOT DETECTED
BARBITURATES: NOT DETECTED
BENZOYLECGONINE: NOT DETECTED
BUPRENORPHINE: NOT DETECTED
CARISOPRODOL: NOT DETECTED
CLONAZEPAM: NOT DETECTED
CODEINE: NOT DETECTED
CREATININE URINE: 65.9 MG/DL (ref 20–400)
DIAZEPAM: NOT DETECTED
DRUGS EXPECTED: NORMAL
EER PAIN MGT DRUG PANEL, HIGH RES/EMIT U: NORMAL
ETHYL GLUCURONIDE: PRESENT
FENTANYL: NOT DETECTED
GABAPENTIN: NOT DETECTED
HYDROCODONE: NOT DETECTED
HYDROMORPHONE: NOT DETECTED
LORAZEPAM: NOT DETECTED
MARIJUANA METABOLITE: NOT DETECTED
MDA: NOT DETECTED
MDEA: NOT DETECTED
MDMA URINE: NOT DETECTED
MEPERIDINE: NOT DETECTED
METHADONE: NOT DETECTED
METHAMPHETAMINE: NOT DETECTED
METHYLPHENIDATE: NOT DETECTED
MIDAZOLAM: NOT DETECTED
MORPHINE: NOT DETECTED
NALOXONE: NOT DETECTED
NORBUPRENORPHINE, FREE: NOT DETECTED
NORDIAZEPAM: NOT DETECTED
NORFENTANYL: NOT DETECTED
NORHYDROCODONE, URINE: NOT DETECTED
NOROXYCODONE: NOT DETECTED
NOROXYMORPHONE, URINE: NOT DETECTED
OXAZEPAM: NOT DETECTED
OXYCODONE: NOT DETECTED
OXYMORPHONE: NOT DETECTED
PAIN MANAGEMENT DRUG PANEL: NORMAL
PAIN MANAGEMENT DRUG PANEL: NORMAL
PCP: NOT DETECTED
PHENTERMINE: NOT DETECTED
PREGABALIN: NOT DETECTED
TAPENTADOL, URINE: NOT DETECTED
TAPENTADOL-O-SULFATE, URINE: NOT DETECTED
TEMAZEPAM: NOT DETECTED
TRAMADOL: PRESENT
ZOLPIDEM: NOT DETECTED

## 2022-12-07 NOTE — TELEPHONE ENCOUNTER
From: Ssuana Gomez  To: Dr. Pastrana Fearin2022 8:42 AM EST  Subject: Pain in legs    Dr Sp Rodriguez is there anything I can by over the counter for my legs they hurt so bad when sandra sitting or walking they also feels tight at times

## 2022-12-08 ENCOUNTER — OFFICE VISIT (OUTPATIENT)
Dept: CARDIOLOGY CLINIC | Age: 67
End: 2022-12-08
Payer: COMMERCIAL

## 2022-12-08 ENCOUNTER — PATIENT MESSAGE (OUTPATIENT)
Dept: PRIMARY CARE CLINIC | Age: 67
End: 2022-12-08

## 2022-12-08 VITALS
HEART RATE: 86 BPM | BODY MASS INDEX: 25.52 KG/M2 | HEIGHT: 60 IN | WEIGHT: 130 LBS | DIASTOLIC BLOOD PRESSURE: 84 MMHG | SYSTOLIC BLOOD PRESSURE: 140 MMHG | OXYGEN SATURATION: 100 %

## 2022-12-08 DIAGNOSIS — I48.0 PAROXYSMAL A-FIB (HCC): Primary | ICD-10-CM

## 2022-12-08 DIAGNOSIS — I25.10 CORONARY ARTERY DISEASE INVOLVING NATIVE CORONARY ARTERY OF NATIVE HEART WITHOUT ANGINA PECTORIS: ICD-10-CM

## 2022-12-08 PROCEDURE — 1123F ACP DISCUSS/DSCN MKR DOCD: CPT | Performed by: NURSE PRACTITIONER

## 2022-12-08 PROCEDURE — G8484 FLU IMMUNIZE NO ADMIN: HCPCS | Performed by: NURSE PRACTITIONER

## 2022-12-08 PROCEDURE — G8417 CALC BMI ABV UP PARAM F/U: HCPCS | Performed by: NURSE PRACTITIONER

## 2022-12-08 PROCEDURE — 1036F TOBACCO NON-USER: CPT | Performed by: NURSE PRACTITIONER

## 2022-12-08 PROCEDURE — 3074F SYST BP LT 130 MM HG: CPT | Performed by: NURSE PRACTITIONER

## 2022-12-08 PROCEDURE — G8400 PT W/DXA NO RESULTS DOC: HCPCS | Performed by: NURSE PRACTITIONER

## 2022-12-08 PROCEDURE — 93000 ELECTROCARDIOGRAM COMPLETE: CPT | Performed by: NURSE PRACTITIONER

## 2022-12-08 PROCEDURE — 3017F COLORECTAL CA SCREEN DOC REV: CPT | Performed by: NURSE PRACTITIONER

## 2022-12-08 PROCEDURE — 99214 OFFICE O/P EST MOD 30 MIN: CPT | Performed by: NURSE PRACTITIONER

## 2022-12-08 PROCEDURE — G8427 DOCREV CUR MEDS BY ELIG CLIN: HCPCS | Performed by: NURSE PRACTITIONER

## 2022-12-08 PROCEDURE — 3078F DIAST BP <80 MM HG: CPT | Performed by: NURSE PRACTITIONER

## 2022-12-08 PROCEDURE — 1090F PRES/ABSN URINE INCON ASSESS: CPT | Performed by: NURSE PRACTITIONER

## 2022-12-08 RX ORDER — NITROGLYCERIN 0.4 MG/1
0.4 TABLET SUBLINGUAL EVERY 5 MIN PRN
Qty: 25 TABLET | Refills: 3 | Status: SHIPPED | OUTPATIENT
Start: 2022-12-08

## 2022-12-08 NOTE — PROGRESS NOTES
The [address] [place] [address] [id number] [address] [place] [address]  [phone]    PrimaryCare Doctor:  Javier Woods MD  Primary Cardiologist: Dr. Benji Trivedi    Chief Complaint   Patient presents with    Follow-up    Shortness of Breath    Chest Pain       History of Present Illness:  Adriane Menjivar is a 78 y.o. female with PMH CAD, DHF, PAD, HTN, HLP, DM, CKD, anemia, GIB, vag bleedng. Hx of PAF s/p Watchman JOANIE closure device 5/16/2022. Cardiac CTA - stopped plavix and asa, started eliquis 2.5mg BID  Hx CAD w recurrent CP- non cardiac. [facility] 8/27 with CP- stress neg. ED 10/9 with non cardiac CP  Most recent hospitalization 11/14/2022 with non cardiac- Seen by Dr. Benji Trivedi. Called [facility] 11/28/22 with C/O increased AF episodes. Noted her labetolol was decreased last hospitalization. Instructed her to increase to her previous dose 200mg daily. Seen by Dr. Kayleen Hester, EP 12/5. Rec 15 day monitor to assess for AF/flutter then possibly ILR before doing ablation. Patient presents to [facility] cardiology for follow up for JOANIE closure with Watchman implant. C/O depression and generalized pain. She see PCP but hasn't had much relief. Denies any know AF - palpitations. Denies SOB, LH, dizziness, CP, syncope, swelling, orthopnea. Review of Systems:   General: Denies fever, chills, fatigue, weakness  Skin: Denies skin changes, rash, itching, lesions.   HEENT: Denies headache, dizziness, vision changes, nosebleeds, sore throat, nasal drainage  RESP: Denies cough, sputum, dyspnea, wheeze, snoring  CARD: Denies palpitations,  murmur  GI:Denies nausea, vomiting, heartburn, loss of appetite, change in bowels  : Denies frequency, pain, incontinence, polyuria  VASC: Denies claudication, leg cramps, clots  MUSC/SKEL: Denies pain, stiffness, arthritis  PSYCH: Denies anxiety, depression, stress  NEURO: Denies numbness, tingling, weakness,change in mood or memory  HEME: Denies abn bruising, bleeding, anemia  ENDO: Denies intolerance to heat, cold, excessive thirst or hunger, hx thyroid disease    BP (!) 140/84 (Site: Right Upper Arm, Position: Sitting)   Pulse 86   Ht 5' (1.524 m)   Wt 130 lb (59 kg)   SpO2 100%   BMI 25.39 kg/m²   Wt Readings from Last 3 Encounters:   12/08/22 130 lb (59 kg)   12/05/22 131 lb (59.4 kg)   12/02/22 132 lb (59.9 kg)       Physical Exam:  GEN: Appears well, no acute distress  SKIN: Brown, warm, dry. Nails without clubbing. HEENT: PERRLA. Normocephalic, atraumatic. Neck supple. No adenopathy. LUNG: AP diameter normal. Clear bilateral. No wheeze, rales, or ronchi. Respiratory effort normal.  HEART: S1S2 A/R. No JVD. No carotid bruit. No murmur, rub or gallop. ABD: Soft, nontender. +BS X 4 quads. No hepatomegaly. EXT: Radial and pedal pulses 2+ and symmetric. Without varicosities. No edema. MUSCSKEL: Good ROM X4 extremities. No deformity. NEURO: A/O X3. Calm and cooperative. Past Medical History:   has a past medical history of Acid reflux, Anemia, Anxiety and depression, Arthritis, Asthma, Atrial fibrillation ([address] CAD (coronary artery disease), Cerebral artery occlusion with cerebral infarction ([address] CHF (congestive heart failure) ([address] Chronic kidney disease--stage III, COPD (chronic obstructive pulmonary disease) ([address] DM2 (diabetes mellitus, type 2) ([address] Dysarthria, Fibromyalgia, Headache(784.0), Hemisensory loss, History of blood transfusion, Hyperlipidemia, Hypertension, IBS (irritable bowel syndrome), Inferior vena cava occlusion ([address] Keratitis, Meningioma ([address] MI, old, Neuropathy, Superior vena cava obstruction, Temporal arteritis ([address] and Wears glasses. Surgical History:   has a past surgical history that includes Tunneled venous port placement; Cholecystectomy; ablation of dysrhythmic focus (1999  and 11/2020); Cataract removal (Bilateral); joint replacement (Right); Hysterectomy; Artery Biopsy (Right, 04/23/2014);  Coronary angioplasty with stent (2020); Knee arthroscopy (Right); Percutaneous Transluminal Coronary Angio (10/2019); Upper gastrointestinal endoscopy (N/A, 7/6/2020); Carotid artery surgery (Left); Colonoscopy (N/A, 4/9/2021); and Colonoscopy (N/A, 10/15/2021). Social History:   reports that she quit smoking about 4 years ago. Her smoking use included cigarettes. She has a 17.50 pack-year smoking history. She has never used smokeless tobacco. She reports that she does not drink alcohol and does not use drugs. Family History:   Family History   Problem Relation Age of Onset    Diabetes Mother     High Cholesterol Mother     Stroke Mother     Cancer Mother     High Blood Pressure Mother     No Known Problems Paternal Grandfather         lung issues        HomeMedications:  Prior to Admission medications    Medication Sig Start Date End Date Taking? Authorizing Provider   diclofenac sodium (VOLTAREN) 1 % GEL Apply 4 g topically 4 times daily 12/7/22  Yes Saman Hsieh MD   traMADol (ULTRAM) 50 MG tablet Take 1 tablet by mouth 2 times daily as needed for Pain for up to 30 days. Intended supply: 7 days. Take lowest dose possible to manage pain 12/2/22 1/1/23 Yes Saman Hsieh MD   acetaminophen (TYLENOL) 500 MG tablet Take 1 tablet by mouth 4 times daily as needed for Pain 12/2/22  Yes Saman Hsieh MD   labetalol (NORMODYNE) 200 MG tablet Take 1 tablet by mouth 2 times daily 12/2/22  Yes Saman Hsieh MD   isosorbide mononitrate (IMDUR) 60 MG extended release tablet Take 1 tablet by mouth daily 12/2/22  Yes Saman Hsieh MD   amLODIPine (NORVASC) 10 MG tablet Take 1 tablet by mouth daily 12/2/22  Yes Saman Hsieh MD   pregabalin (LYRICA) 50 MG capsule Take 1 capsule by mouth 3 times daily for 30 days.  12/2/22 1/1/23 Yes Saman Hsieh MD   buPROPion [facility]) 150 MG extended release tablet Take 150 mg by mouth daily 11/14/22  Yes Vania Baca MD   fexofenadine (ALLEGRA) 180 MG tablet Take 180 mg by mouth daily 5/4/22  Yes Historical Provider, MD   tiZANidine (ZANAFLEX) 4 MG tablet Take 4 mg by mouth daily 11/14/22  Yes Historical Provider, MD   ondansetron (ZOFRAN-ODT) 4 MG disintegrating tablet Take 1 tablet by mouth 3 times daily as needed for Nausea or Vomiting 11/8/22  Yes Cody Ivy MD   ELIQUIS 2.5 MG TABS tablet TAKE 1 TABLET BY MOUTH IN THE MORNING AND 1 TABLET BEFORE BEDTIME. 11/2/22  Yes CYRUS Navarro - CNP   QUEtiapine (SEROQUEL) 25 MG tablet Take 1-2 tablets by mouth nightly 10/18/22  Yes Eugenio Garcia MD   Erenumab-aooe (AIMOVIG) 140 MG/ML SOAJ Inject 140 mg into the skin every 30 days 10/18/22  Yes Eugenio Garcia MD   omeprazole (PRILOSEC) 20 MG delayed release capsule TAKE 1 CAPSULE BY MOUTH DAILY 10/18/22  Yes Cody Ivy MD   montelukast (SINGULAIR) 10 MG tablet TAKE 1 TABLET BY MOUTH DAILY 10/18/22  Yes Cody Ivy MD   albuterol sulfate HFA (PROVENTIL;VENTOLIN;PROAIR) 108 (90 Base) MCG/ACT inhaler Inhale 2 puffs into the lungs every 4 hours as needed for Wheezing or Shortness of Breath 9/28/22  Yes Cody Ivy MD   fluticasone-salmeterol (ADVAIR) 500-50 MCG/ACT AEPB diskus inhaler Inhale 1 puff into the lungs in the morning and 1 puff in the evening. 9/28/22  Yes Cody Ivy MD   clotrimazole-betamethasone (LOTRISONE) 1-0.05 % cream Apply topically 2 times daily.  5/25/22  Yes Cody Ivy MD   ASPIRIN LOW DOSE 81 MG EC tablet TAKE 1 TABLET BY MOUTH DAILY 4/19/22  Yes Cody Ivy MD   albuterol (PROVENTIL) (2.5 MG/3ML) 0.083% nebulizer solution TAKE 3 MLS BY NEBULIZATION EVERY 4 HOURS AS NEEDED FOR WHEEZING 3/14/22  Yes Cody Ivy MD   insulin glargine (BASAGLAR KWIKPEN) 100 UNIT/ML injection pen Inject 8 Units into the skin 2 times daily 1/27/22  Yes Cody Ivy MD   docusate sodium (COLACE) 100 MG capsule Take 100 mg by mouth 2 times daily 11/29/21  Yes Historical Provider, MD   atorvastatin (LIPITOR) 80 MG tablet TAKE 1 TABLET BY MOUTH NIGHTLY 11/2/21  Yes Kang Augustine MD   ranolazine (RANEXA) 1000 MG extended release tablet Take 1 tablet by mouth 2 times daily 10/29/21  Yes Bogdan Ott MD   linaclotide [facility]) 145 MCG capsule Take 1 capsule by mouth every morning (before breakfast) 10/15/21  Yes Eva Campa MD   nitroGLYCERIN (NITROSTAT) 0.4 MG SL tablet Place 1 tablet under the tongue every 5 minutes as needed for Chest pain up to max of 3 total doses. If no relief after 1 dose, call 911. 12/16/20  Yes Kang Augustine MD        Allergies:  Patient has no known allergies. LABS: Results reviewed with patient today.     CBC:   Lab Results   Component Value Date/Time    WBC 3.8 11/15/2022 05:57 AM    WBC 5.9 11/14/2022 11:42 AM    WBC 4.1 10/09/2022 06:18 AM    RBC 3.33 11/15/2022 05:57 AM    RBC 3.32 11/14/2022 11:42 AM    RBC 3.70 10/09/2022 06:18 AM    HGB 9.9 11/15/2022 05:57 AM    HGB 9.7 11/14/2022 11:42 AM    HGB 11.2 10/09/2022 06:18 AM    HCT 30.7 11/15/2022 05:57 AM    HCT 31.1 11/14/2022 11:42 AM    HCT 34.2 10/09/2022 06:18 AM    MCV 92.2 11/15/2022 05:57 AM    MCV 93.4 11/14/2022 11:42 AM    MCV 92.3 10/09/2022 06:18 AM    RDW 15.4 11/15/2022 05:57 AM    RDW 15.3 11/14/2022 11:42 AM    RDW 15.3 10/09/2022 06:18 AM     11/15/2022 05:57 AM     11/14/2022 11:42 AM     10/09/2022 06:18 AM     BMP:  Lab Results   Component Value Date/Time     11/15/2022 08:13 AM     11/14/2022 11:43 AM     10/09/2022 06:18 AM    K 4.5 11/15/2022 08:13 AM    K 4.3 11/14/2022 11:43 AM    K 4.1 10/09/2022 06:18 AM    K 4.2 08/30/2022 07:11 AM    K 4.1 08/27/2022 10:56 AM    K 5.3 04/12/2022 02:50 PM     11/15/2022 08:13 AM     11/14/2022 11:43 AM     10/09/2022 06:18 AM    CO2 18 11/15/2022 08:13 AM    CO2 21 11/14/2022 11:43 AM    CO2 21 10/09/2022 06:18 AM    PHOS 3.5 08/29/2022 01:10 PM    PHOS 4.1 08/28/2022 06:30 AM    PHOS 4.1 02/14/2022 11:10 AM    BUN 32 11/15/2022 08:13 AM    BUN 23 11/14/2022 11:43 AM    BUN 26 10/09/2022 06:18 AM    CREATININE 1.5 11/15/2022 08:13 AM    CREATININE 1.4 11/14/2022 11:43 AM    CREATININE 1.4 10/09/2022 06:18 AM     BNP:   Lab Results   Component Value Date/Time    PROBNP 1,067 10/09/2022 06:18 AM    PROBNP 493 08/29/2022 01:10 PM    PROBNP 1,338 01/13/2022 06:01 AM       Parameters:   > 450 pg/mL under age 48  > 900 pg/mL ages 54-65  > 1800 pg/mL over age 76    Iron Studies:    Lab Results   Component Value Date/Time    TIBC 283 08/27/2022 06:49 PM    TIBC 275 01/11/2022 07:58 AM    TIBC 294 06/30/2021 08:22 AM    FERRITIN 62.7 08/27/2022 06:49 PM    FERRITIN 80.6 01/11/2022 07:58 AM    FERRITIN 267.0 06/30/2021 08:22 AM     GLUCOSE: No results for input(s): GLUCOSE in the last 72 hours. LIVER PROFILE:   Lab Results   Component Value Date/Time    AST 26 10/09/2022 06:18 AM    ALT 18 10/09/2022 06:18 AM    LIPASE 21.0 11/14/2022 11:43 AM    AMYLASE 44 10/27/2015 05:16 AM    LABALBU 4.1 10/09/2022 06:18 AM    BILIDIR <0.2 08/22/2019 02:20 PM    BILITOT <0.2 10/09/2022 06:18 AM    ALKPHOS 228 10/09/2022 06:18 AM     PT/INR:   Lab Results   Component Value Date/Time    PROTIME 12.0 06/19/2021 06:31 PM    PROTIME 10.3 12/15/2009 01:34 AM    INR 1.03 06/19/2021 06:31 PM     Cardiac Enzymes:  Lab Results   Component Value Date/Time    CKTOTAL 41 08/22/2019 02:20 PM    CKMB 2.3 10/06/2013 07:56 PM    CKMB 1.34 09/25/2010 04:00 AM    CKMB 0.48 04/01/2010 09:47 AM    CKMBINDEX 0.9 04/01/2010 09:47 AM    TROPONINI <0.01 11/15/2022 05:57 AM     FASTING LIPID PANEL:  Lab Results   Component Value Date/Time    CHOL 209 08/28/2022 06:30 AM    HDL 53 08/28/2022 06:30 AM    HDL 58 05/14/2012 03:27 PM    LDLCALC 132 08/28/2022 06:30 AM    TRIG 121 08/28/2022 06:30 AM       Cardiac Imaging: Reports reviewed with patient today. EKG: Today, NSR HR 89 with chronic ST abn    Cardiac cath 12/21/18  ANGIOGRAPHY FINDINGS:  1.   Left main comes from the left coronary cusp. YAKELIN 3 flow. No  stenosis. 2.  Left anterior descending artery is patent. Distal left anterior  descending artery has a 70% stenosis. 3.  Left circumflex has patent stents. No significant stenosis. 4.  Right coronary artery has patent stents. No significant stenosis. 5.  LV ejection fraction 60%. SUMMARY:  Patent coronary artery stents. Distal left anterior  descending artery 70% stenosis. Stress test 3/11/2021   Summary    Pharmacological Stress/MPI Results:        1. Technically a satisfactory study. 2. No evidence of Ischemia by Myocardial Perfusion Imaging. 3. Gated Study shows normal LV size and Systolic function; EF is 70 %. ECHO 1/11/2022  Normal LV size and wall motion. EF is   60%. Grade II diastolic dysfunction  with elevated LV filling pressures. The left atrium is mildly dilated. Normal right ventricular size and function  Trivial Mitral and Tricuspid regurgitation. ECHO: Limited Post Watchman procedure   Summary   There is no evidence of pericardial effusion    Cardiac CTA 8/27/2022  Impression   Watch man device is seen. No opacification of the left atrial appendage   common to suggest leakage. . There is beam hardening artifact from the watch   man device. No obvious thrombus on the left atrial face of the watch man   device. Occlusion of the SVC and brachiocephalic vein with multiple venous collaterals       Scarring-fibrosis in the lungs     NM Stress 8/29/2022  Conclusions  Summary  Non-diagnostic ECG d/t inadequate achieved HF (Pharmacologic study)  Normal myocardial perfusion study. Normal LV size and systolic function. Overall findings represent a low risk scan. Recommendation  No further required ischemic evaluation    ECHO 8/29/2022  Summary  Normal left ventricle size, wall thickness and systolic function with an estimated ejection fraction of 55-60%. Grade II diastolic dysfunction with elevated LV filling pressures.   Normal right ventricular size and function. The left atrium is moderately dilated. Assessment/Plan:    Impression     1. Atrial fibrillation (non-valvular) s/p successful percutaneous left atrial appendage occlusion  Onset 1999  Previous ablations X2 with Dr. Estefania Ying  Currently following with Dr. Eva Collins.   CHADSVASC- 6    Recommendations    - Completed Cardiac CTA at 45 days- Dr. Jeet Zamarripa stopped asa and plavix, added eliquis 2.5mg BID  - Per Dr. Jeet Zamarripa, OK to stop eliquis today, cont asa 81mg daily  -Toprol XL for rate control    Instructions:   FU wi Dr. Jeet Zamarripa 1 year    I appreciate the opportunity of cooperating in the care of this individual.    CYRUS Hoffmann - CNP, CNP, 12/8/2022,11:59 AM

## 2022-12-09 ENCOUNTER — TELEPHONE (OUTPATIENT)
Dept: ADMINISTRATIVE | Age: 67
End: 2022-12-09

## 2022-12-12 ENCOUNTER — PATIENT MESSAGE (OUTPATIENT)
Dept: PRIMARY CARE CLINIC | Age: 67
End: 2022-12-12

## 2022-12-12 NOTE — TELEPHONE ENCOUNTER
Received an APPROVAL for Pregabalin 50MG capsules from 12/11/2022 til 12/11/2023. Letter attached. If this requires a response please respond to the pool ( [id number] [facility]). Thank you please advise patient.

## 2022-12-13 ENCOUNTER — PATIENT MESSAGE (OUTPATIENT)
Dept: CARDIOLOGY CLINIC | Age: 67
End: 2022-12-13

## 2022-12-13 ENCOUNTER — CARE COORDINATION (OUTPATIENT)
Dept: CARE COORDINATION | Age: 67
End: 2022-12-13

## 2022-12-13 NOTE — TELEPHONE ENCOUNTER
From: Nancy Huerta  To: Dr. Geovanny Xiao: 12/12/2022 11:15 AM EST  Subject: Pain     Dr Cuate Alas in so much pain just wondering if I could take 2 tramadol it hurts me to walk and wonder if you wrote that letter

## 2022-12-13 NOTE — TELEPHONE ENCOUNTER
From: Rommel Shine  To: Dr. Wood Spotted: 12/8/2022 3:23 PM EST  Subject: Aide    I have someone to come just need that letter they are willing to stay 24 hours

## 2022-12-13 NOTE — CARE COORDINATION
Ambulatory Care Coordination Note  2022    ACC: Eva Jack RN    Acm out reach call placed to pt per PCP request. Introduced self and reason for call and care coordination pt agreeable to future calls. Pt reports she is having issues with pain which hinders her ability to perform ADL's   Pt states she had a aide for 24 hours a week from  [employer] , but it stopped. 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 ( Pt lives in [facility]) [place]  Apparently PCP provided a letter the beginning of this year but now she needs another one alittle \" more specific\" discussed possible limitations due to [employer] Policies. Pt verbalizes understanding  Heart Failure Education outreach Date/Time: 2022 1:47 PM    Ambulatory Care Manager (ACM) contacted the patient by telephone to perform Ambulatory Care Coordination. Verified name and  with patient as identifiers. Provided introduction to self, and explanation of the Ambulatory Care Manager's role. ACM reviewed that a Health Healthy tips for the Summer packet has been mailed to the them. ACM reviewed CHF zones, daily weights, fluid restriction, the importance of low sodium diet, and healthy tips packet with the patient. Instructed patient to call their PCP if they have a weight gain of 3 lbs in 2 days or 5 lbs in a week. Patient reminded that there is a physician on call 24 hours a day / 7 days a week should the patient have questions or concerns. The patient verbalized understanding.      Offered patient enrollment in the Remote Patient Monitoring (RPM) program for in-home monitoring: will discuss at next out reach    Ambulatory Care Coordination Assessment    Care Coordination Protocol  Referral from Primary Care Provider: Yes  Week 1 - Initial Assessment     Do you have all of your prescriptions and are they filled?: No (Comment: awaiting approval for lyrica)  Barriers to medication adherence: None  Are you able to afford your medications?: Yes  How often do you have trouble taking your medications the way you have been told to take them?: I always take them as prescribed. Do you have Home O2 Therapy?: No      Ability to seek help/take action for Emergent Urgent situations i.e. fire, crime, inclement weather or health crisis.: Needs Assistance  Ability to ambulate to restroom: Needs Assistance  Ability handle personal hygeine needs (bathing/dressing/grooming): Needs Assistance  Ability to manage Medications: Needs Assistance  Ability to prepare Food Preparation: Needs Assistance  Ability to maintain home (clean home, laundry): Needs Assistance  Ability to drive and/or has transportation: Needs Assistance  Ability to do shopping: Needs Assistance  Ability to manage finances: Needs Assistance  Is patient able to live independently?: Yes     Current Housing: Independent Living/Senior Housing        Per the Fall Risk Screening, did the patient have 2 or more falls or 1 fall with injury in the past year?: No     Frequent urination at night?: No  Do you use rails/bars?: No  Do you have a non-slip tub mat?: Yes     Are you experiencing loss of meaning?: No  Are you experiencing loss of hope and peace?: No     Suggested Interventions and Community Resources                  Prior to Admission medications    Medication Sig Start Date End Date Taking? Authorizing Provider   nitroGLYCERIN (NITROSTAT) 0.4 MG SL tablet Place 1 tablet under the tongue every 5 minutes as needed for Chest pain up to max of 3 total doses. If no relief after 1 dose, call 911. 12/8/22  Yes CYRUS Paul - CNP   diclofenac sodium (VOLTAREN) 1 % GEL Apply 4 g topically 4 times daily 12/7/22  Yes Rosibel House MD   traMADol (ULTRAM) 50 MG tablet Take 1 tablet by mouth 2 times daily as needed for Pain for up to 30 days. Intended supply: 7 days.  Take lowest dose possible to manage pain 12/2/22 1/1/23 Yes Dante Valdez MD   acetaminophen (TYLENOL) 500 MG tablet Take 1 tablet by mouth 4 times daily as needed for Pain 12/2/22  Yes Dante Valdez MD   labetalol (NORMODYNE) 200 MG tablet Take 1 tablet by mouth 2 times daily 12/2/22  Yes Dante Valdez MD   isosorbide mononitrate (IMDUR) 60 MG extended release tablet Take 1 tablet by mouth daily 12/2/22  Yes Dante Valdez MD   amLODIPine (NORVASC) 10 MG tablet Take 1 tablet by mouth daily 12/2/22  Yes Dante Valdez MD   pregabalin (LYRICA) 50 MG capsule Take 1 capsule by mouth 3 times daily for 30 days. 12/2/22 1/1/23 Yes Dante Valdez MD   buPROPion [facility]) 150 MG extended release tablet Take 150 mg by mouth daily 11/14/22  Yes Historical Provider, MD   fexofenadine (ALLEGRA) 180 MG tablet Take 180 mg by mouth daily 5/4/22  Yes Historical Provider, MD   tiZANidine (ZANAFLEX) 4 MG tablet Take 4 mg by mouth daily 11/14/22  Yes Historical Provider, MD   ondansetron (ZOFRAN-ODT) 4 MG disintegrating tablet Take 1 tablet by mouth 3 times daily as needed for Nausea or Vomiting 11/8/22  Yes Dante Valdez MD   QUEtiapine (SEROQUEL) 25 MG tablet Take 1-2 tablets by mouth nightly 10/18/22  Yes Stephany Arndt MD   Erenumab-aooe (AIMOVIG) 140 MG/ML SOAJ Inject 140 mg into the skin every 30 days 10/18/22  Yes Stephany Arndt MD   omeprazole (PRILOSEC) 20 MG delayed release capsule TAKE 1 CAPSULE BY MOUTH DAILY 10/18/22  Yes Dante Valdez MD   montelukast (SINGULAIR) 10 MG tablet TAKE 1 TABLET BY MOUTH DAILY 10/18/22  Yes Danet Valdez MD   albuterol sulfate HFA (PROVENTIL;VENTOLIN;PROAIR) 108 (90 Base) MCG/ACT inhaler Inhale 2 puffs into the lungs every 4 hours as needed for Wheezing or Shortness of Breath 9/28/22  Yes Dante Valdez MD   fluticasone-salmeterol (ADVAIR) 500-50 MCG/ACT AEPB diskus inhaler Inhale 1 puff into the lungs in the morning and 1 puff in the evening.  9/28/22  Yes Dante Valdez MD   clotrimazole-betamethasone (LOTRISONE) 1-0.05 % cream Apply topically 2 times daily.  5/25/22  Yes Willean Denver, MD   ASPIRIN LOW DOSE 81 MG EC tablet TAKE 1 TABLET BY MOUTH DAILY 4/19/22  Yes Willean Denver, MD   albuterol (PROVENTIL) (2.5 MG/3ML) 0.083% nebulizer solution TAKE 3 MLS BY NEBULIZATION EVERY 4 HOURS AS NEEDED FOR WHEEZING 3/14/22  Yes Willean Denver, MD   insulin glargine (BASAGLAR KWIKPEN) 100 UNIT/ML injection pen Inject 8 Units into the skin 2 times daily 1/27/22  Yes Willean Denver, MD   docusate sodium (COLACE) 100 MG capsule Take 100 mg by mouth 2 times daily 11/29/21  Yes Historical Provider, MD   atorvastatin (LIPITOR) 80 MG tablet TAKE 1 TABLET BY MOUTH NIGHTLY 11/2/21  Yes Willean Denver, MD   ranolazine (RANEXA) 1000 MG extended release tablet Take 1 tablet by mouth 2 times daily 10/29/21  Yes Itzel Stewart MD   linaclotide [facility]) 145 MCG capsule Take 1 capsule by mouth every morning (before breakfast) 10/15/21  Yes Ana Maria Harrell MD       Future Appointments   Date Time Provider Yoni Liza   12/16/2022  9:30 AM Quyen Rodriguez MD [facility]   1/5/2023  9:00 AM Willean Denver, MD [facility] [place] [facility]   6/9/2023  4:00 PM Itzel Stewatr MD [facility]     ,   Diabetes Assessment    Medic Alert ID: No  Meal Planning: Avoidance of concentrated sweets            ,   Congestive Heart Failure Assessment           Symptoms:          , and   General Assessment    Do you have any symptoms that are causing concern?: Yes  Progression since Onset: Unchanged  Reported Symptoms: Pain, Chest Pain

## 2022-12-14 NOTE — TELEPHONE ENCOUNTER
Carlos Soni MD  You 4 hours ago (8:21 AM)     [facility]  You need to be under a Pain Specialist      Msg sent to pt'

## 2022-12-16 ENCOUNTER — OFFICE VISIT (OUTPATIENT)
Dept: VASCULAR SURGERY | Age: 67
End: 2022-12-16
Payer: COMMERCIAL

## 2022-12-16 ENCOUNTER — PROCEDURE VISIT (OUTPATIENT)
Dept: SURGERY | Age: 67
End: 2022-12-16
Payer: COMMERCIAL

## 2022-12-16 VITALS — WEIGHT: 130 LBS | SYSTOLIC BLOOD PRESSURE: 128 MMHG | BODY MASS INDEX: 25.39 KG/M2 | DIASTOLIC BLOOD PRESSURE: 78 MMHG

## 2022-12-16 DIAGNOSIS — M79.604 RIGHT LEG PAIN: ICD-10-CM

## 2022-12-16 DIAGNOSIS — I82.220 INFERIOR VENA CAVA OCCLUSION (HCC): ICD-10-CM

## 2022-12-16 DIAGNOSIS — E11.40 CHRONIC PAINFUL DIABETIC NEUROPATHY (HCC): ICD-10-CM

## 2022-12-16 DIAGNOSIS — I82.211: ICD-10-CM

## 2022-12-16 DIAGNOSIS — M79.604 LEG PAIN, ANTERIOR, RIGHT: ICD-10-CM

## 2022-12-16 DIAGNOSIS — I48.0 PAROXYSMAL A-FIB (HCC): Primary | ICD-10-CM

## 2022-12-16 DIAGNOSIS — I73.9 PAD (PERIPHERAL ARTERY DISEASE) (HCC): Primary | ICD-10-CM

## 2022-12-16 DIAGNOSIS — N18.30 STAGE 3 CHRONIC KIDNEY DISEASE, UNSPECIFIED WHETHER STAGE 3A OR 3B CKD (HCC): ICD-10-CM

## 2022-12-16 PROBLEM — M31.6 GIANT CELL ARTERITIS (HCC): Status: RESOLVED | Noted: 2017-05-05 | Resolved: 2022-12-16

## 2022-12-16 PROCEDURE — 99214 OFFICE O/P EST MOD 30 MIN: CPT | Performed by: SURGERY

## 2022-12-16 PROCEDURE — 3074F SYST BP LT 130 MM HG: CPT | Performed by: SURGERY

## 2022-12-16 PROCEDURE — G8427 DOCREV CUR MEDS BY ELIG CLIN: HCPCS | Performed by: SURGERY

## 2022-12-16 PROCEDURE — 93925 LOWER EXTREMITY STUDY: CPT | Performed by: STUDENT IN AN ORGANIZED HEALTH CARE EDUCATION/TRAINING PROGRAM

## 2022-12-16 PROCEDURE — G8484 FLU IMMUNIZE NO ADMIN: HCPCS | Performed by: SURGERY

## 2022-12-16 PROCEDURE — G8417 CALC BMI ABV UP PARAM F/U: HCPCS | Performed by: SURGERY

## 2022-12-16 PROCEDURE — G8400 PT W/DXA NO RESULTS DOC: HCPCS | Performed by: SURGERY

## 2022-12-16 PROCEDURE — 1090F PRES/ABSN URINE INCON ASSESS: CPT | Performed by: SURGERY

## 2022-12-16 PROCEDURE — 2022F DILAT RTA XM EVC RTNOPTHY: CPT | Performed by: SURGERY

## 2022-12-16 PROCEDURE — 1123F ACP DISCUSS/DSCN MKR DOCD: CPT | Performed by: SURGERY

## 2022-12-16 PROCEDURE — 3017F COLORECTAL CA SCREEN DOC REV: CPT | Performed by: SURGERY

## 2022-12-16 PROCEDURE — 1036F TOBACCO NON-USER: CPT | Performed by: SURGERY

## 2022-12-16 PROCEDURE — 3046F HEMOGLOBIN A1C LEVEL >9.0%: CPT | Performed by: SURGERY

## 2022-12-16 PROCEDURE — 3078F DIAST BP <80 MM HG: CPT | Performed by: SURGERY

## 2022-12-16 RX ORDER — DULOXETIN HYDROCHLORIDE 60 MG/1
CAPSULE, DELAYED RELEASE ORAL
COMMUNITY
Start: 2022-12-14

## 2022-12-16 RX ORDER — AMITRIPTYLINE HYDROCHLORIDE 10 MG/1
TABLET, FILM COATED ORAL
COMMUNITY
Start: 2022-12-14

## 2022-12-16 ASSESSMENT — ENCOUNTER SYMPTOMS
ALLERGIC/IMMUNOLOGIC NEGATIVE: 1
GASTROINTESTINAL NEGATIVE: 1
EYES NEGATIVE: 1
RESPIRATORY NEGATIVE: 1

## 2022-12-16 NOTE — PROGRESS NOTES
Daily Progress Note   Mitra Raza MD      12/16/2022    Chief Complaint   Patient presents with    Leg Swelling     Bilateral leg swelling and pain. Pt states her right leg is worse than the left, she denies wearing compression stockings, walking is painful more so in the calf. HISTORY OF PRESENT ILLNESS:                The patient is a 78 y.o. female who presents with bilateral leg swelling and pain, with the right more than the left. Ms. Amandeep Howard says she has leg swelling daily. . She also says when she walks she has pain in her calf and shin,on both legs. She says her feet are cold, and sometimes numb. She says she stopped taking Eliquis on 12/8/2022, but her legs were already painful before that time. She has an irregular heartbeat, and has had a watchman procedure done. She says she is wearing a heart monitor today. She sees both Dr. Loretta Moreira and Dr. Anat Orellana for her heart.  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  Past Medical History:   Diagnosis Date    Acid reflux     Anemia     Anxiety and depression     Arthritis     Asthma     Atrial fibrillation ([facility])     CAD (coronary artery disease) 12/3/2012    Cerebral artery occlusion with cerebral infarction [facility])     TIA\"\"S--right sided weakness & headache    CHF (congestive heart failure) ([facility])     Chronic kidney disease--stage III     40% kidney function    COPD (chronic obstructive pulmonary disease) ([facility])     DM2 (diabetes mellitus, type 2) ([facility]     Dysarthria     Fibromyalgia 6/7/2016    Headache(784.0) 2/19/2013    Hemisensory loss     History of blood transfusion 11/2020    pt denies having transfusion reaction    Hyperlipidemia     Hypertension     IBS (irritable bowel syndrome)     Inferior vena cava occlusion ([facility])     Keratitis Meningioma (HCC)     MI, old     Neuropathy     Superior vena cava obstruction     Temporal arteritis ([address] 2014    Wears glasses        Past Surgical History:   Procedure Laterality Date    ABLATION OF DYSRHYTHMIC FOCUS    and 2020    times 2    ARTERY BIOPSY Right 2014    RIGHT TEMPORAL ARTERY BIOPSY    CAROTID ARTERY SURGERY Left     clean up per pt    CATARACT REMOVAL Bilateral     CHOLECYSTECTOMY      COLONOSCOPY N/A 2021    COLONOSCOPY WITH BIOPSY performed by Randi Rodrigues MD at [address] [place] [address] N/A 10/15/2021    COLONOSCOPY performed by Randi Rodrigues MD at [place] [address] (CERVIX STATUS UNKNOWN)      JOINT REPLACEMENT Right     KNEE ARTHROSCOPY Right     PTCA  10/2019    LAD and RCA inrtervention    TUNNELED VENOUS PORT PLACEMENT      left thigh. SMART PORT-----Removed--total of 4 port placement and removal    UPPER GASTROINTESTINAL ENDOSCOPY N/A 2020    EGD DIAGNOSTIC ONLY performed by Monisha Lazcano MD at [address] [place] [address] History     Socioeconomic History    Marital status:       Spouse name: Not on file    Number of children: 8    Years of education: Not on file    Highest education level: Not on file   Occupational History    Not on file   Tobacco Use    Smoking status: Former     Packs/day: 0.50     Years: 35.00     Pack years: 17.50     Types: Cigarettes     Quit date: 2018     Years since quittin.4    Smokeless tobacco: Never    Tobacco comments:     5/13/15 has not smoked since hospitalization - kh   Vaping Use    Vaping Use: Never used   Substance and Sexual Activity    Alcohol use: No     Alcohol/week: 0.0 standard drinks    Drug use: Never    Sexual activity: Not Currently     Partners: Male   Other Topics Concern    Not on file   Social History Narrative    Not on file     Social Determinants of Health     Financial Resource Strain: Low Risk     Difficulty of Paying Living Expenses: Not hard at all   Food Insecurity: No Food Insecurity    Worried About Running Out of Food in the Last Year: Never true    Ran Out of Food in the Last Year: Never true   Transportation Needs: Not on file   Physical Activity: Insufficiently Active    Days of Exercise per Week: 7 days    Minutes of Exercise per Session: 10 min   Stress: Not on file   Social Connections: Not on file   Intimate Partner Violence: Not on file   Housing Stability: Not on file       Family History   Problem Relation Age of Onset    Diabetes Mother     High Cholesterol Mother     Stroke Mother     Cancer Mother     High Blood Pressure Mother     No Known Problems Paternal Grandfather         lung issues          Current Outpatient Medications:     amitriptyline (ELAVIL) 10 MG tablet, , Disp: , Rfl:     DULoxetine (CYMBALTA) 60 MG extended release capsule, , Disp: , Rfl:     nitroGLYCERIN (NITROSTAT) 0.4 MG SL tablet, Place 1 tablet under the tongue every 5 minutes as needed for Chest pain up to max of 3 total doses. If no relief after 1 dose, call 911., Disp: 25 tablet, Rfl: 3    diclofenac sodium (VOLTAREN) 1 % GEL, Apply 4 g topically 4 times daily, Disp: 250 g, Rfl: 4    traMADol (ULTRAM) 50 MG tablet, Take 1 tablet by mouth 2 times daily as needed for Pain for up to 30 days. Intended supply: 7 days. Take lowest dose possible to manage pain, Disp: 30 tablet, Rfl: 0    acetaminophen (TYLENOL) 500 MG tablet, Take 1 tablet by mouth 4 times daily as needed for Pain, Disp: 120 tablet, Rfl: 5    labetalol (NORMODYNE) 200 MG tablet, Take 1 tablet by mouth 2 times daily, Disp: 60 tablet, Rfl: 3    isosorbide mononitrate (IMDUR) 60 MG extended release tablet, Take 1 tablet by mouth daily, Disp: 90 tablet, Rfl: 5    amLODIPine (NORVASC) 10 MG tablet, Take 1 tablet by mouth daily, Disp: 90 tablet, Rfl: 1    pregabalin (LYRICA) 50 MG capsule, Take 1 capsule by mouth 3 times daily for 30 days. , Disp: 90 capsule, Rfl: 1 buPROPion (WELLBUTRIN SR) 150 MG extended release tablet, Take 150 mg by mouth daily, Disp: , Rfl:     fexofenadine (ALLEGRA) 180 MG tablet, Take 180 mg by mouth daily, Disp: , Rfl:     tiZANidine (ZANAFLEX) 4 MG tablet, Take 4 mg by mouth daily, Disp: , Rfl:     ondansetron (ZOFRAN-ODT) 4 MG disintegrating tablet, Take 1 tablet by mouth 3 times daily as needed for Nausea or Vomiting, Disp: 30 tablet, Rfl: 1    QUEtiapine (SEROQUEL) 25 MG tablet, Take 1-2 tablets by mouth nightly, Disp: 60 tablet, Rfl: 1    Erenumab-aooe (AIMOVIG) 140 MG/ML SOAJ, Inject 140 mg into the skin every 30 days, Disp: 1 Adjustable Dose Pre-filled Pen Syringe, Rfl: 1    omeprazole (PRILOSEC) 20 MG delayed release capsule, TAKE 1 CAPSULE BY MOUTH DAILY, Disp: 30 capsule, Rfl: 1    montelukast (SINGULAIR) 10 MG tablet, TAKE 1 TABLET BY MOUTH DAILY, Disp: 30 tablet, Rfl: 1    albuterol sulfate HFA (PROVENTIL;VENTOLIN;PROAIR) 108 (90 Base) MCG/ACT inhaler, Inhale 2 puffs into the lungs every 4 hours as needed for Wheezing or Shortness of Breath, Disp: 54 g, Rfl: 5    fluticasone-salmeterol (ADVAIR) 500-50 MCG/ACT AEPB diskus inhaler, Inhale 1 puff into the lungs in the morning and 1 puff in the evening., Disp: 60 each, Rfl: 3    clotrimazole-betamethasone (LOTRISONE) 1-0.05 % cream, Apply topically 2 times daily. , Disp: 5 g, Rfl: 5    ASPIRIN LOW DOSE 81 MG EC tablet, TAKE 1 TABLET BY MOUTH DAILY, Disp: 90 tablet, Rfl: 5    albuterol (PROVENTIL) (2.5 MG/3ML) 0.083% nebulizer solution, TAKE 3 MLS BY NEBULIZATION EVERY 4 HOURS AS NEEDED FOR WHEEZING, Disp: 360 mL, Rfl: 5    insulin glargine (BASAGLAR KWIKPEN) 100 UNIT/ML injection pen, Inject 8 Units into the skin 2 times daily, Disp: 5 pen, Rfl: 3    docusate sodium (COLACE) 100 MG capsule, Take 100 mg by mouth 2 times daily, Disp: , Rfl:     atorvastatin (LIPITOR) 80 MG tablet, TAKE 1 TABLET BY MOUTH NIGHTLY, Disp: 90 tablet, Rfl: 5    ranolazine (RANEXA) 1000 MG extended release tablet, Take 1 tablet by mouth 2 times daily, Disp: 60 tablet, Rfl: 8    linaclotide (LINZESS) 145 MCG capsule, Take 1 capsule by mouth every morning (before breakfast), Disp: 30 capsule, Rfl: 5    Patient has no known allergies. Vitals:    12/16/22 0933   BP: 128/78   Weight: 130 lb (59 kg)       Orders Only on 12/02/2022   Component Date Value Ref Range Status    Drugs Expected 12/02/2022 no drugs listed   Final    Pain Management Drug Panel 12/02/2022 Inconsistent   Final    Comment: ________________________________________________________________  DRUGS EXPECTED:  ULTRAM (TRAMADOL) [PRN]  LYRICA (PREGABALIN)  ________________________________________________________________  CONSISTENT with medications provided:  ULTRAM (TRAMADOL) : based on immunoassay detection  ________________________________________________________________  INCONSISTENT with medications provided:  LYRICA (PREGABALIN): based on the absence of pregabalin  Ethyl-glucuronide: based on immunoassay detection  ________________________________________________________________  INTERPRETIVE INFORMATION: Targeted drug profile Interp  Interpretation depends on accuracy and completeness of patient  medication  information submitted by client.       Creatinine, Ur 12/02/2022 65.9  20.0 - 400.0 mg/dL Final    Codeine 12/02/2022 Not Detected   Final    Morphine 12/02/2022 Not Detected   Final    6-Acetylmorphine 12/02/2022 Not Detected   Final    Oxycodone 12/02/2022 Not Detected   Final    Noroxycodone 12/02/2022 Not Detected   Final    Oxymorphone 12/02/2022 Not Detected   Final    Noroxymorphone, Urine 12/02/2022 Not Detected   Final    Hydrocodone 12/02/2022 Not Detected   Final    Norhydrocodone, Urine 12/02/2022 Not Detected   Final    Hydromorphone 12/02/2022 Not Detected   Final    Naloxone 12/02/2022 Not Detected   Final    Buprenorphine 12/02/2022 Not Detected   Final    Norbuprenorphine 12/02/2022 Not Detected   Final    Fentanyl 12/02/2022 Not Detected Final    Norfentanyl 12/02/2022 Not Detected   Final    Meperidine 12/02/2022 Not Detected   Final    Tapentadol, Urine 12/02/2022 Not Detected   Final    Tapentadol-O-Sulfate, Urine 12/02/2022 Not Detected   Final    Methadone 12/02/2022 Not Detected   Final    Tramadol 12/02/2022 Present   Final    Amphetamine 12/02/2022 Not Detected   Final    Methamphetamine 12/02/2022 Not Detected   Final    MDMA, Urine 12/02/2022 Not Detected   Final    MDA 12/02/2022 Not Detected   Final    MDEA 12/02/2022 Not Detected   Final    Methylphenidate 12/02/2022 Not Detected   Final    Phentermine 12/02/2022 Not Detected   Final    Benzoylecgonine 12/02/2022 Not Detected   Final    Alprazolam 12/02/2022 Not Detected   Final    Alpha-OH-alprazolam 12/02/2022 Not Detected   Final    Clonazepam 12/02/2022 Not Detected   Final    7-aminoclonazepam 12/02/2022 Not Detected   Final    Diazepam 12/02/2022 Not Detected   Final    Nordiazepam 12/02/2022 Not Detected   Final    OXAZEPAM 12/02/2022 Not Detected   Final    TEMAZEPAM 12/02/2022 Not Detected   Final    Lorazepam 12/02/2022 Not Detected   Final    Midazolam 12/02/2022 Not Detected   Final    Zolpidem 12/02/2022 Not Detected   Final    Gabapentin 12/02/2022 Not Detected   Final    Pregabalin 12/02/2022 Not Detected   Final    Alpha-OH-Midazolam, Urine 12/02/2022 Not Detected   Final    Barbiturates 12/02/2022 Not Detected   Final    Ethyl Glucuronide 12/02/2022 Present   Final    Marijuana Metabolite 12/02/2022 Not Detected   Final    PCP 12/02/2022 Not Detected   Final    CARISOPRODOL 12/02/2022 Not Detected   Final    Comment: The carisoprodol immunoassay has cross-reactivity to carisoprodol and  meprobamate.       Pain Management Drug Panel 12/02/2022 See Below   Final    Comment: Methodology: Qualitative Enzyme Immunoassay and Qualitative Liquid  Chromatography-Tandem Mass Spectrometry, Quantitative Spectrophotometry  The absence of expected drug(s) and/or drug metabolite(s) may indicate  non-compliance, inappropriate timing of specimen collection relative to  drug  administration, poor drug absorption, diluted/adulterated urine, or  limitations of testing. The concentration must be greater than or equal  to  the cutoff to be reported as present. If specific drug concentrations  are  required, contact the laboratory within two weeks of specimen  collection to  request quantification by a second analytical technique. Interpretive  questions should be directed to the laboratory. Results based on immunoassay detection that do not match clinical  expectations should be  interpreted with caution. Confirmatory testing by mass spectrometry for  immunoassay-based results is available, if ordered within two weeks of  specimen collection. Additional charges apply. For                            medical purposes only; not valid for forensic use. This test was developed and its performance characteristics determined  by  [employer]. It has not been cleared or approved by the [employer]  and  Drug Administration. This test was performed in a CLIA certified  laboratory  and is intended for clinical purposes. EER Pain t Drug Panel, High Res/* 12/02/2022 See Note   Final    Comment: Authorized individuals can access the [employer] Report using the following link:    [email or web address]. [employer]/?[email or web address]  Performed By: [employer] [facility]  [address], [address] [place] [address]  : Candice Guzman MD, PhD         Review of Systems   Constitutional: Negative. HENT: Negative. Eyes: Negative. Respiratory: Negative. Cardiovascular:  Positive for leg swelling. Gastrointestinal: Negative. Endocrine: Negative. Genitourinary: Negative. Musculoskeletal:  Positive for arthralgias. Skin: Negative. Allergic/Immunologic: Negative. Neurological: Negative. Hematological: Negative. Psychiatric/Behavioral: Negative.      All other systems reviewed and are negative. Physical Exam  Vitals and nursing note reviewed. Constitutional:       Appearance: Normal appearance. She is well-developed. HENT:      Mouth/Throat:      Pharynx: No oropharyngeal exudate. Eyes:      Conjunctiva/sclera: Conjunctivae normal.      Pupils: Pupils are equal, round, and reactive to light. Cardiovascular:      Rate and Rhythm: Normal rate. Rhythm irregular. Pulses:           Carotid pulses are 2+ on the right side and 2+ on the left side. Radial pulses are 0 on the right side. Femoral pulses are 1+ on the right side and 1+ on the left side. Dorsalis pedis pulses are 0 on the right side and 2+ on the left side. Posterior tibial pulses are 0 on the right side and 1+ on the left side. Heart sounds: Normal heart sounds. Comments:   Doppler 12/16/2022:  Rt DP: monophasic  Rt PT: monophasic (slightly stronger)  Rt AT:     Rt Radial: biphasic  Rt ulnar: biphasic    Lt DP: biphasic  Lt PT: biphasic  Lt AT:    Lt radial biphasic  Lt ulnar: biphasic      MEASUREMENTS 12/16/2022    RIGHT ANKLE: 17.5 cm   RIGHT CALF: 28.4 cm     LEFT ANKLE: 16.8 cm   LEFT CALF: 28.9 cm       Pulmonary:      Effort: Pulmonary effort is normal.      Breath sounds: Normal breath sounds. Abdominal:      General: Bowel sounds are normal.   Musculoskeletal:         General: Normal range of motion. Cervical back: Normal range of motion. Neurological:      Mental Status: She is alert and oriented to person, place, and time. Deep Tendon Reflexes: Reflexes are normal and symmetric. Psychiatric:         Speech: Speech normal.         Behavior: Behavior normal.         Thought Content:  Thought content normal.         Judgment: Judgment normal.         ASSESSMENT:    Problem List Items Addressed This Visit          Medium    Paroxysmal A-fib (HCC) - Primary    Leg pain, anterior, right       Unprioritized    Inferior vena cava occlusion ([address] Chronic painful diabetic neuropathy (HCC)    Relevant Medications    amitriptyline (ELAVIL) 10 MG tablet    DULoxetine (CYMBALTA) 60 MG extended release capsule    Thrombosis of superior vena cava, chronic (HCC)    CKD (chronic kidney disease) stage 3, GFR 30-59 ml/min (HCC)   She did have decreased vascular pulses on the right on exam , history of atrial fib watchman procedure and recently taken off anticoagulation got an arterial duplex today which showed only about a 50% proximal SFA stenosis otherwise biphasic signals and open 3 vessels below the knee. No sign of embolic disease on the right leg. Left leg has palpable pulses and this is not changed since we saw her before and her last duplex scan which was in January 2022. You would think with an inferior vena cava occlusion she did have significant massive swelling but she does not she has very mild swelling and this has not changed in some time but I do not think it explains her right shin pain when she walks. This pain would not go away for 15 minutes even after resting elevating the leg etc.. So I explained that I think this is not arterial and is not venous which leaves nerves bones and joints or muscles. PLAN:    Return as needed        I Kenny Cheung MA am scribing for and in the presence of Kiara Liu MD on this date of 12/16/22    I Veena Kenyon MD personally performed the services described in this documentation as scribed by the Medical Assistant Kenny Cheung in my presence and it is both accurate and complete.       Electronically signed by Kiara Liu MD on 12/16/2022 at 11:57 AM

## 2022-12-19 DIAGNOSIS — M47.812 CERVICAL SPONDYLOSIS: Primary | ICD-10-CM

## 2022-12-19 NOTE — TELEPHONE ENCOUNTER
From: Ashley Navarro  To: Dr. Jossie Ruffin: 2022 3:40 PM EST  Subject: Pain in left arm     Having this heavy feeling in my upper left arm and the left side of my face

## 2022-12-20 ENCOUNTER — OFFICE VISIT (OUTPATIENT)
Dept: ORTHOPEDIC SURGERY | Age: 67
End: 2022-12-20
Payer: COMMERCIAL

## 2022-12-20 VITALS — WEIGHT: 130 LBS | RESPIRATION RATE: 18 BRPM | HEIGHT: 60 IN | BODY MASS INDEX: 25.52 KG/M2

## 2022-12-20 DIAGNOSIS — M79.604 LOW BACK PAIN RADIATING TO RIGHT LEG: Primary | ICD-10-CM

## 2022-12-20 DIAGNOSIS — M54.50 LOW BACK PAIN RADIATING TO RIGHT LEG: Primary | ICD-10-CM

## 2022-12-20 DIAGNOSIS — M54.16 LUMBAR RADICULOPATHY, RIGHT: ICD-10-CM

## 2022-12-20 PROCEDURE — G8400 PT W/DXA NO RESULTS DOC: HCPCS | Performed by: PHYSICIAN ASSISTANT

## 2022-12-20 PROCEDURE — G8417 CALC BMI ABV UP PARAM F/U: HCPCS | Performed by: PHYSICIAN ASSISTANT

## 2022-12-20 PROCEDURE — 99214 OFFICE O/P EST MOD 30 MIN: CPT | Performed by: PHYSICIAN ASSISTANT

## 2022-12-20 PROCEDURE — 3017F COLORECTAL CA SCREEN DOC REV: CPT | Performed by: PHYSICIAN ASSISTANT

## 2022-12-20 PROCEDURE — 1123F ACP DISCUSS/DSCN MKR DOCD: CPT | Performed by: PHYSICIAN ASSISTANT

## 2022-12-20 PROCEDURE — 1036F TOBACCO NON-USER: CPT | Performed by: PHYSICIAN ASSISTANT

## 2022-12-20 PROCEDURE — 1090F PRES/ABSN URINE INCON ASSESS: CPT | Performed by: PHYSICIAN ASSISTANT

## 2022-12-20 PROCEDURE — G8484 FLU IMMUNIZE NO ADMIN: HCPCS | Performed by: PHYSICIAN ASSISTANT

## 2022-12-20 PROCEDURE — G8427 DOCREV CUR MEDS BY ELIG CLIN: HCPCS | Performed by: PHYSICIAN ASSISTANT

## 2022-12-20 NOTE — PROGRESS NOTES
New Patient: LUMBAR SPINE      Referring Provider:  Ziggy Lynn MD    CHIEF COMPLAINT:    Chief Complaint   Patient presents with    Back Pain       HISTORY OF PRESENT ILLNESS:    Ms. Sosa Borja  is a pleasant 78 y.o. female here for consultation regarding her LBP and right leg pain. She states her pain began suddenly about 2 months ago. Her pain has steadily worsened since then. She rates her back pain 10/10 VAS and right leg pain 10/10 VAS. She describes the pain as sharp that is worse with any activity and better with nothing . The leg pain radiates down the posterior latera aspect of her leg to her right foot. She reports numbness and tingling in her right and left leg. Shereports weakness of herright leg and denies bowel or bladder dysfunction. She states she can sit for a maximum of 60 and stand for a maximum 60. The pain does disrupts her sleep and requires sleeping in a reclining chair. Current/Past Treatment:   Physical Therapy: Home stretching program.  Chiropractic: No  Injection: No  Medications:    NSAIDS: Cannot take NSAIDs due to renal disease. Muscle relaxer: None  Steriods: Steroids in the past without relief. Neuropathic medications: Currently on Lyrica for the past 3 weeks without improvement. Opioids: Tramadol prescribed for pain.   Other: NONE   Surgery/Consult NONE    Past Medical History:   Past Medical History:   Diagnosis Date    Acid reflux     Anemia     Anxiety and depression     Arthritis     Asthma     Atrial fibrillation ([facility])     CAD (coronary artery disease) 12/3/2012    Cerebral artery occlusion with cerebral infarction [facility])     TIA\"\"S--right sided weakness & headache    CHF (congestive heart failure) ([facility])     Chronic kidney disease--stage III     40% kidney function    COPD (chronic obstructive pulmonary disease) ([facility])     DM2 (diabetes mellitus, type 2) ([facility]     Dysarthria     Fibromyalgia 6/7/2016    Headache(784.0) 2/19/2013    Hemisensory loss     History of blood transfusion 11/2020    pt denies having transfusion reaction    Hyperlipidemia     Hypertension     IBS (irritable bowel syndrome)     Inferior vena cava occlusion (HCC)     Keratitis     Meningioma (HCC)     MI, old     Neuropathy     Superior vena cava obstruction     Temporal arteritis ([address] 2/24/2014    Wears glasses       Past Surgical History:     Past Surgical History:   Procedure Laterality Date    ABLATION OF DYSRHYTHMIC FOCUS  1999  and 11/2020    times 2    ARTERY BIOPSY Right 04/23/2014    RIGHT TEMPORAL ARTERY BIOPSY    CAROTID ARTERY SURGERY Left     clean up per pt    CATARACT REMOVAL Bilateral     CHOLECYSTECTOMY      COLONOSCOPY N/A 4/9/2021    COLONOSCOPY WITH BIOPSY performed by Cheyenne Jansen MD at [address] [place] [address] N/A 10/15/2021    COLONOSCOPY performed by Cheyenne Jansen MD at [facility] (CERVIX STATUS UNKNOWN)      JOINT REPLACEMENT Right     KNEE ARTHROSCOPY Right     PTCA  10/2019    LAD and RCA inrtervention    TUNNELED VENOUS PORT PLACEMENT      left thigh. SMART PORT-----Removed--total of 4 port placement and removal    UPPER GASTROINTESTINAL ENDOSCOPY N/A 7/6/2020    EGD DIAGNOSTIC ONLY performed by Sravan Jefferson MD at [facility]     Current Medications:     Current Outpatient Medications:     amitriptyline (ELAVIL) 10 MG tablet, , Disp: , Rfl:     DULoxetine (CYMBALTA) 60 MG extended release capsule, , Disp: , Rfl:     nitroGLYCERIN (NITROSTAT) 0.4 MG SL tablet, Place 1 tablet under the tongue every 5 minutes as needed for Chest pain up to max of 3 total doses. If no relief after 1 dose, call 911., Disp: 25 tablet, Rfl: 3    diclofenac sodium (VOLTAREN) 1 % GEL, Apply 4 g topically 4 times daily, Disp: 250 g, Rfl: 4    traMADol (ULTRAM) 50 MG tablet, Take 1 tablet by mouth 2 times daily as needed for Pain for up to 30 days. Intended supply: 7 days.  Take lowest dose possible to manage pain, Disp: 30 tablet, Rfl: 0    acetaminophen (TYLENOL) 500 MG tablet, Take 1 tablet by mouth 4 times daily as needed for Pain, Disp: 120 tablet, Rfl: 5    labetalol (NORMODYNE) 200 MG tablet, Take 1 tablet by mouth 2 times daily, Disp: 60 tablet, Rfl: 3    isosorbide mononitrate (IMDUR) 60 MG extended release tablet, Take 1 tablet by mouth daily, Disp: 90 tablet, Rfl: 5    amLODIPine (NORVASC) 10 MG tablet, Take 1 tablet by mouth daily, Disp: 90 tablet, Rfl: 1    pregabalin (LYRICA) 50 MG capsule, Take 1 capsule by mouth 3 times daily for 30 days. , Disp: 90 capsule, Rfl: 1    buPROPion (WELLBUTRIN SR) 150 MG extended release tablet, Take 150 mg by mouth daily, Disp: , Rfl:     fexofenadine (ALLEGRA) 180 MG tablet, Take 180 mg by mouth daily, Disp: , Rfl:     tiZANidine (ZANAFLEX) 4 MG tablet, Take 4 mg by mouth daily, Disp: , Rfl:     ondansetron (ZOFRAN-ODT) 4 MG disintegrating tablet, Take 1 tablet by mouth 3 times daily as needed for Nausea or Vomiting, Disp: 30 tablet, Rfl: 1    QUEtiapine (SEROQUEL) 25 MG tablet, Take 1-2 tablets by mouth nightly, Disp: 60 tablet, Rfl: 1    Erenumab-aooe (AIMOVIG) 140 MG/ML SOAJ, Inject 140 mg into the skin every 30 days, Disp: 1 Adjustable Dose Pre-filled Pen Syringe, Rfl: 1    omeprazole (PRILOSEC) 20 MG delayed release capsule, TAKE 1 CAPSULE BY MOUTH DAILY, Disp: 30 capsule, Rfl: 1    montelukast (SINGULAIR) 10 MG tablet, TAKE 1 TABLET BY MOUTH DAILY, Disp: 30 tablet, Rfl: 1    albuterol sulfate HFA (PROVENTIL;VENTOLIN;PROAIR) 108 (90 Base) MCG/ACT inhaler, Inhale 2 puffs into the lungs every 4 hours as needed for Wheezing or Shortness of Breath, Disp: 54 g, Rfl: 5    fluticasone-salmeterol (ADVAIR) 500-50 MCG/ACT AEPB diskus inhaler, Inhale 1 puff into the lungs in the morning and 1 puff in the evening., Disp: 60 each, Rfl: 3    clotrimazole-betamethasone (LOTRISONE) 1-0.05 % cream, Apply topically 2 times daily. , Disp: 5 g, Rfl: 5    ASPIRIN LOW DOSE 81 MG EC tablet, TAKE 1 TABLET BY MOUTH DAILY, Disp: 90 tablet, Rfl: 5    albuterol (PROVENTIL) (2.5 MG/3ML) 0.083% nebulizer solution, TAKE 3 MLS BY NEBULIZATION EVERY 4 HOURS AS NEEDED FOR WHEEZING, Disp: 360 mL, Rfl: 5    insulin glargine (BASAGLAR KWIKPEN) 100 UNIT/ML injection pen, Inject 8 Units into the skin 2 times daily, Disp: 5 pen, Rfl: 3    docusate sodium (COLACE) 100 MG capsule, Take 100 mg by mouth 2 times daily, Disp: , Rfl:     atorvastatin (LIPITOR) 80 MG tablet, TAKE 1 TABLET BY MOUTH NIGHTLY, Disp: 90 tablet, Rfl: 5    ranolazine (RANEXA) 1000 MG extended release tablet, Take 1 tablet by mouth 2 times daily, Disp: 60 tablet, Rfl: 8    linaclotide (LINZESS) 145 MCG capsule, Take 1 capsule by mouth every morning (before breakfast), Disp: 30 capsule, Rfl: 5  Allergies:  Patient has no known allergies. Social History:    reports that she quit smoking about 4 years ago. Her smoking use included cigarettes. She has a 17.50 pack-year smoking history. She has never used smokeless tobacco. She reports that she does not drink alcohol and does not use drugs. Family History:   Family History   Problem Relation Age of Onset    Diabetes Mother     High Cholesterol Mother     Stroke Mother     Cancer Mother     High Blood Pressure Mother     No Known Problems Paternal Grandfather         lung issues        REVIEW OF SYSTEMS: Full ROS dated 1/4/2022 date noted & scanned. CONSTITUTIONAL: Denies unexplained weight loss, fevers, chills or fatigue  NEUROLOGICAL: Denies unsteady gait or progressive weakness  MUSCULOSKELETAL: Denies joint swelling or redness  PSYCHOLOGICAL: Denies anxiety, depression   SKIN: Denies skin changes, delayed healing, rash, itching   HEMATOLOGIC: Denies easy bleeding or bruising  ENDOCRINE: Denies excessive thirst, urination, heat/cold  RESPIRATORY: Denies current dyspnea, cough  GI: Denies nausea, vomiting, diarrhea   : Denies bowel or bladder issues      PHYSICAL EXAM:    Vitals: Resp.  rate 18, height 5' (1.524 m), weight 130 lb (59 kg), not currently breastfeeding. GENERAL EXAM:  General Apparence: Patient is adequately groomed with no evidence of malnutrition. Orientation: The patient is oriented to time, place and person. Mood & Affect:The patient's mood and affect are appropriate. Vascular: Examination reveals no swelling tenderness in upper or lower extremities. Good capillary refill. Lymphatic: The lymphatic examination bilaterally reveals all areas to be without enlargement or induration  Sensation: Sensation is intact without deficit  Coordination/Balance: Good coordination     LUMBAR/SACRAL EXAMINATION:  Inspection: Local inspection shows no step-off or bruising. Lumbar alignment is normal.  Sagittal and Coronal balance is neutral.      Palpation:   No evidence of tenderness at the midline. No tenderness bilaterally at the paraspinal or trochanters. There is no step-off or paraspinal spasm. Range of Motion: limited by 50% in all planes due to pain          Hip ER and IR are pain free and within normal limits. [facility] test is             Strength:   Strength testing is 5/5 in all muscle groups tested. Special Tests:   Straight leg raise and crossed SLR negative. Leg length and pelvis level. Skin: There are no rashes, ulcerations or lesions. Reflexes: Reflexes are symmetrically 2+ at the patellar and ankle tendons. Clonus absent bilaterally at the feet. Gait & station: normal, patient ambulates without assistance  Additional Examinations:   RIGHT LOWER EXTREMITY: Inspection/examination of the right lower extremity does not show any tenderness, deformity or injury. Range of motion is unremarkable. There is no gross instability. There are no rashes, ulcerations or lesions. 3/5 strength with right EHL testing. Otherwise 5/5 strength of all other major motor groups.   LEFT LOWER EXTREMITY:  Inspection/examination of the left lower extremity does not show any tenderness, deformity or injury. Range of motion is unremarkable. There is no gross instability. There are no rashes, ulcerations or lesions. Strength and tone are normal.    Diagnostic Testing:    AP and lateral lumbar spine obtained in the office today shows mild multilevel degenerative disc disease with moderate lumbar facet arthropathy. No evidence of a spondylolisthesis. Impression:    Diagnosis Orders   1. Low back pain radiating to right leg  XR LUMBAR SPINE (2-3 VIEWS)      2. Lumbar radiculopathy, right              Plan:    Above diagnoses are Worsening she has a documented radiculopathy with 3/5 strength right EHL testing. I had an extensive discussion with Ms. Akbar Sibley and/or family regarding the natural history, etiology, and long term consequences of her condition. I have presented reasonable alternatives to the patient's proposed care, treatment, and services. Risks and benefits of the treatment options also reviewed in detail. I have outlined a treatment plan with them. She has had full opportunity to ask her questions. I have answered them all to her satisfaction. I feel that Ms. Akbar Sibley understands our discussion today. 1. Medications: No further recommendations for new medications. She is already on tramadol for pain and Lyrica. Cannot take NSAIDs due to renal disease. 2. PT:   Continue with home stretching exercise program the best that she can do. 3. Further studies:  Setup Lumbar MR without contrast to evaluate for soft tissue pathology or stenosis contributing to the back pain and paresthesia. The patient has failed a six week trial of a HEP program within the last 6 months. 4. Interventional:  After further imaging is obtained, interventional options will be reviewed and recommended. Follow Up: After lumbar spine MRI to review test results.     She was instructed to call us emergently if she begins to experience bowel or bladder dysfunction, saddle anesthesia, increasing muscle weakness, or onset/ worsening leg symptoms. The total time spent on today's visit including reviewing test results, history, performance of physical exam, counseling/ education, ordering of medications, tests or procedures was 35 minutes. This time does include completion of the medical record. This time excludes any time spent performing procedures or tests in the office. Audelia Morton PA-C   Board Certified by the [employer] on Certification of Physician Assistants   Senior Physician Assistant   [facility]                                         Disclaimer: This note was generated with use of a verbal recognition program (DRAGON) and an attempt was made to check for errors. It is possible that there are still dictated errors within this office note. If so, please bring any significant errors to my attention for an addendum. All efforts were made to ensure that this office note is accurate.

## 2022-12-21 ENCOUNTER — TELEPHONE (OUTPATIENT)
Dept: PRIMARY CARE CLINIC | Age: 67
End: 2022-12-21

## 2022-12-21 DIAGNOSIS — I25.10 CORONARY ARTERY DISEASE INVOLVING NATIVE CORONARY ARTERY OF NATIVE HEART WITHOUT ANGINA PECTORIS: ICD-10-CM

## 2022-12-21 NOTE — TELEPHONE ENCOUNTER
Pt called stating that the orthopedic told her to stay on medication traMADol (ULTRAM) 50 MG tablet  needed for pain.  Pt needs refill sent to [id number] [facility], [address] [place] [id number]

## 2022-12-21 NOTE — TELEPHONE ENCOUNTER
Ramandeep Bustillo MD  to [facility]    12:02 PM  Must get Pain meds from Ortho or Pain      [facility] advising pt'

## 2022-12-22 RX ORDER — RANOLAZINE 1000 MG/1
1000 TABLET, EXTENDED RELEASE ORAL 2 TIMES DAILY
Qty: 60 TABLET | Refills: 8 | Status: SHIPPED | OUTPATIENT
Start: 2022-12-22

## 2022-12-23 ENCOUNTER — PATIENT MESSAGE (OUTPATIENT)
Dept: ORTHOPEDIC SURGERY | Age: 67
End: 2022-12-23

## 2022-12-27 ENCOUNTER — PATIENT MESSAGE (OUTPATIENT)
Dept: PRIMARY CARE CLINIC | Age: 67
End: 2022-12-27

## 2022-12-27 ENCOUNTER — TELEPHONE (OUTPATIENT)
Dept: ORTHOPEDIC SURGERY | Age: 67
End: 2022-12-27

## 2022-12-27 NOTE — TELEPHONE ENCOUNTER
From: Kolby Taylor  To: Dr. Renate Ganser: 12/27/2022 9:39 AM EST  Subject: Pain    Just spoke to  pain clinic they say that they have not received a referral for me maybe hopefully Dr Robert Ocampo can order something a little stronger than these Tylenol

## 2022-12-27 NOTE — TELEPHONE ENCOUNTER
Prescription Refill     Medication Name:  TRAMADOL 50 MG  Pharmacy: [address] [place] [address] Elijah Tang   Patient Contact Number:  [phone]

## 2022-12-27 NOTE — TELEPHONE ENCOUNTER
General Question     Subject: MEDICATION FOR MRI   Patient and /or Facility Request: Dee Mari  Contact Number: [phone]    PATIENT CALLED IN TO SEE IF SHE CAN GET SOME MEDICATION FOR HER MRI. Armando Schwab PATIENT WOULD LIKE TO KNOW WHICH LOCATION SHE CAN GO TO FOR HER OPEN MRI. .. PLEASE CALL PATIENT BACK AT THE ABOVE NUMBER. ..

## 2022-12-28 DIAGNOSIS — M54.16 LUMBAR RADICULOPATHY, RIGHT: Primary | ICD-10-CM

## 2022-12-28 DIAGNOSIS — F40.240 CLAUSTROPHOBIA: ICD-10-CM

## 2022-12-28 RX ORDER — DIAZEPAM 10 MG/1
10 TABLET ORAL PRN
Qty: 2 TABLET | Refills: 0 | Status: SHIPPED | OUTPATIENT
Start: 2022-12-28 | End: 2023-01-09

## 2022-12-28 NOTE — TELEPHONE ENCOUNTER
Leonie Chávez MD  You 15 hours ago (6:11 PM)     [facility]  I am sorry   Cannot refill narcotics .  Try  ES Tylenol q 4 hrs prn pain    Msg sent to pt'

## 2022-12-29 ENCOUNTER — TELEPHONE (OUTPATIENT)
Dept: CARDIOLOGY CLINIC | Age: 67
End: 2022-12-29

## 2022-12-29 NOTE — TELEPHONE ENCOUNTER
CHEST PAIN QUESTIONS:    1.) Are you having chest pain now?  yes  2.) When did the symptoms start? Yesterday   3.) If you've had a prior stent, is the pain similar to that in the past? No   4.) Is it constant or intermittent? Constant   5.) Where is the pain located? Left side   6.) Does it worsen with activity? yes  7.) Does anything make the chest pain better? rest  8.) Have you taken nitro? Yes   If so, did it improve the pain? A little     9.) Are you having:  [x] SOB only when moving around  [x] Nausea/vomiting  [] Dizziness  [] Sweating         Pt called office stating that she is having chest pressure. Pt states it started yesterday and progressively has gotten worse. Pt states it is a constant discomfort and gets a little better when resting. Pt states she took her nitro this morning and it has helped a little. Pt states she did vomit a couple minutes ago. Please advise.

## 2022-12-30 ENCOUNTER — PATIENT MESSAGE (OUTPATIENT)
Dept: PRIMARY CARE CLINIC | Age: 67
End: 2022-12-30

## 2023-01-03 ENCOUNTER — PATIENT MESSAGE (OUTPATIENT)
Dept: PRIMARY CARE CLINIC | Age: 68
End: 2023-01-03

## 2023-01-03 ENCOUNTER — TELEPHONE (OUTPATIENT)
Dept: ORTHOPEDIC SURGERY | Age: 68
End: 2023-01-03

## 2023-01-03 NOTE — TELEPHONE ENCOUNTER
From: Lukas Mckeon  To: Dr. Harrison Joseph: 1/3/2023 3:28 PM EST  Subject: Blood work     Have a appointment Thursday want to know if I can have blood work done there for my kidney function Check have an appointment to see him on January 9

## 2023-01-03 NOTE — TELEPHONE ENCOUNTER
General Question     Subject: MRI   Patient and /or Facility Request: Violetta Castillo  Contact Number: [phone]      PATIENT CALLED IN TO LET THE OFFICE KNOW THAT SHE SPOKE TO HER HEART DOCTOR . THE DOCTOR STATES THAT SHE SHOULD BE GOOD FOR HER MRI FOR HER LUMBAR. .. PLEASE CALL PATIENT BACK AT THE ABOVE NUMBER. Rehana Angles

## 2023-01-03 NOTE — TELEPHONE ENCOUNTER
From: Celia Damon  To: Nissa Gonzalez  Sent: 12/23/2022 9:46 AM EST  Subject: Pain    I have left message for my PCP about tramondal have not heard anything back in I received the ok for the MRI sandra still in lot of pain can you order me something for pain and also I will need 72 hours to do transportation to go get my MRI

## 2023-01-03 NOTE — TELEPHONE ENCOUNTER
From: Makenna Mcmahan  To: Dr. Ford Ham: 12/30/2022 12:05 PM EST  Subject: asthma     Can my asthma make my chest feels heavy on the left side and have shortness of breath when moving around if is maybe need some steroids

## 2023-01-03 NOTE — TELEPHONE ENCOUNTER
OTHER    Subject: LUMBAR MRI  Patient Request: Argenis Martinezel  Contact Number: [phone]      PATIENT IS REQ A RETURN CALL REGARDING HER LUMBAR MRI. PER CARDIOLOGIST, OK TO GET MRI. PATIENT DID CALL [facility] TO SCHEDULE AND WAS TOLD DUE TO STENTS, NOT COMPATIBLE FOR MRI. PLEASE RETURN CALL TO ABOVE NUMBER.

## 2023-01-03 NOTE — TELEPHONE ENCOUNTER
I called patient. Due to her stent she is not able to schedule mri. I called her and made apt to discuss next step.

## 2023-01-03 NOTE — TELEPHONE ENCOUNTER
General Question     Subject: MEDICATION REFILL   Patient and /or Facility Request: Negarjohn Sosa  Contact Number: [phone] [address] IN TO SEE IF THE PATIENT CAN GET AN PRESCRIPTION OF VOLUME  FOR HER MRI FOR HER BACK. .. CONSUELO NEEDS AN PRE -AUTHORIZATION. .. PATIENT CANT GET IN THE MRI . [facility] [address]    PLEASE CALL CONSUELO BACK AT THE ABOVE NUMBER. ..

## 2023-01-05 ENCOUNTER — OFFICE VISIT (OUTPATIENT)
Dept: PRIMARY CARE CLINIC | Age: 68
End: 2023-01-05

## 2023-01-05 ENCOUNTER — PATIENT MESSAGE (OUTPATIENT)
Dept: PAIN MANAGEMENT | Age: 68
End: 2023-01-05

## 2023-01-05 ENCOUNTER — PATIENT MESSAGE (OUTPATIENT)
Dept: PRIMARY CARE CLINIC | Age: 68
End: 2023-01-05

## 2023-01-05 VITALS
HEIGHT: 60 IN | DIASTOLIC BLOOD PRESSURE: 80 MMHG | OXYGEN SATURATION: 99 % | HEART RATE: 73 BPM | SYSTOLIC BLOOD PRESSURE: 160 MMHG | WEIGHT: 133 LBS | TEMPERATURE: 97.2 F | BODY MASS INDEX: 26.11 KG/M2

## 2023-01-05 DIAGNOSIS — Z23 HIGH PRIORITY FOR COVID-19 VACCINATION: Primary | ICD-10-CM

## 2023-01-05 DIAGNOSIS — Z23 NEED FOR SHINGLES VACCINE: ICD-10-CM

## 2023-01-05 DIAGNOSIS — I10 ESSENTIAL HYPERTENSION: ICD-10-CM

## 2023-01-05 DIAGNOSIS — Z23 FLU VACCINE NEED: ICD-10-CM

## 2023-01-05 DIAGNOSIS — R52 PAIN: ICD-10-CM

## 2023-01-05 DIAGNOSIS — R06.02 SOB (SHORTNESS OF BREATH): ICD-10-CM

## 2023-01-05 RX ORDER — ISOSORBIDE MONONITRATE 60 MG/1
60 TABLET, EXTENDED RELEASE ORAL DAILY
Qty: 90 TABLET | Refills: 5 | Status: SHIPPED | OUTPATIENT
Start: 2023-01-05

## 2023-01-05 RX ORDER — LABETALOL 200 MG/1
200 TABLET, FILM COATED ORAL 2 TIMES DAILY
Qty: 60 TABLET | Refills: 3 | Status: SHIPPED | OUTPATIENT
Start: 2023-01-05

## 2023-01-05 RX ORDER — AMLODIPINE BESYLATE 10 MG/1
10 TABLET ORAL DAILY
Qty: 90 TABLET | Refills: 1 | Status: SHIPPED | OUTPATIENT
Start: 2023-01-05

## 2023-01-05 RX ORDER — FUROSEMIDE 20 MG/1
20 TABLET ORAL DAILY
Qty: 30 TABLET | Refills: 1 | Status: SHIPPED | OUTPATIENT
Start: 2023-01-05

## 2023-01-05 ASSESSMENT — PATIENT HEALTH QUESTIONNAIRE - PHQ9
7. TROUBLE CONCENTRATING ON THINGS, SUCH AS READING THE NEWSPAPER OR WATCHING TELEVISION: 0
SUM OF ALL RESPONSES TO PHQ QUESTIONS 1-9: 1
9. THOUGHTS THAT YOU WOULD BE BETTER OFF DEAD, OR OF HURTING YOURSELF: 0
6. FEELING BAD ABOUT YOURSELF - OR THAT YOU ARE A FAILURE OR HAVE LET YOURSELF OR YOUR FAMILY DOWN: 0
5. POOR APPETITE OR OVEREATING: 0
SUM OF ALL RESPONSES TO PHQ QUESTIONS 1-9: 1
1. LITTLE INTEREST OR PLEASURE IN DOING THINGS: 0
4. FEELING TIRED OR HAVING LITTLE ENERGY: 0
3. TROUBLE FALLING OR STAYING ASLEEP: 0
SUM OF ALL RESPONSES TO PHQ QUESTIONS 1-9: 1
SUM OF ALL RESPONSES TO PHQ QUESTIONS 1-9: 1
SUM OF ALL RESPONSES TO PHQ9 QUESTIONS 1 & 2: 1
10. IF YOU CHECKED OFF ANY PROBLEMS, HOW DIFFICULT HAVE THESE PROBLEMS MADE IT FOR YOU TO DO YOUR WORK, TAKE CARE OF THINGS AT HOME, OR GET ALONG WITH OTHER PEOPLE: 0
2. FEELING DOWN, DEPRESSED OR HOPELESS: 1
8. MOVING OR SPEAKING SO SLOWLY THAT OTHER PEOPLE COULD HAVE NOTICED. OR THE OPPOSITE, BEING SO FIGETY OR RESTLESS THAT YOU HAVE BEEN MOVING AROUND A LOT MORE THAN USUAL: 0

## 2023-01-05 ASSESSMENT — ENCOUNTER SYMPTOMS
CONSTIPATION: 0
SHORTNESS OF BREATH: 1
CHEST TIGHTNESS: 0
NAUSEA: 0
ABDOMINAL DISTENTION: 0
BACK PAIN: 1
BLOOD IN STOOL: 0
VOMITING: 0

## 2023-01-05 NOTE — TELEPHONE ENCOUNTER
From: Tosin Dad  To: Dr. Deanne Zheng: 1/5/2023 12:24 PM EST  Subject: Referral     [facility] and [facility] are saying that they have not [facility] a referral so if you would please resend it or send one to Dr Benjamin Escobar he is the pain Dr that I was seeing he located on [facility] thank you

## 2023-01-05 NOTE — PROGRESS NOTES
Subjective:      Patient ID: Marine Torres is a 78 y.o. female. 12/2/2022 Patient presents with:  Pain: Unable to get in with pain management- still looking for a dr              Last seen  Star Schmitt 1237 11/8/2022 Patient presents with:  Leg Swelling: Both legs swelling- unable to get in due to transportation issues  Nausea: In morning when waking up and sometimes though the day    Marine Torres is a 78year old female with a past medical history of coronary artery disease status post stent placement, atrial fibrillation on Eliquis watchman implant in situ, hypertension, CHF, CKD, type 2 diabetes, COPD, fibromyalgia and chronic pain syndrome who presents with a chief complaint of chest pain and nausea. Patient did have a stress test performed at [facility] on August 28 that came back negative for evidence of ischemic changes. Troponins neg . Stress Test neg 8/22 . Discharged gome          Last seen  Star Schmitt 1237 8/4/2022 Patient presents with:  Diabetes  Hypertension  Headache  Stress:                6/22/2022 Patient presents with: [facility], 6/13-6/14/2022, headaches  CT Head and MRI Head done   Headache  Hypertension: states she is taking meds . 5/25/2022 Patient presents with:  Medicare AWV               1/27/2022 Patient presents with: Follow-Up from Hospital: [facility]-1/9/2022 - 1/13/2022 Chest pain, [place]    . Chest pain, ,, no further work-up recommended per cardiology    Diabetes mellitus, sliding scale    COPD, no acute exacerbation  . A. fib, controlled. Echo noted, discussed with Dr. Silvano Smith, at this time we will keep on aspirin and Plavix, she will follow-up with him as outpatient. To discuss watchman device  Renal insuff  monitor closely, improved creatinine to 1.4 this morning.     Anemia,  Chronic    Peripheral vascular disease with discoloration of second left toe, vascular surgery consulted, Doppler noted with no significant occlusive disease, echo ordered per vascular . 10/1/2020               1/14/2020          9/24/19     8/31/19 !!!           8/29/19      hypertension, diabetes, hyperlipidemia, fibromyalgia, COPD, CKD, CHF and CAD           Review of Systems   Constitutional:  Negative for activity change, fatigue and fever. Flu vac 10/21     tdap 10/16      pneumovac    5/22 ; 10/13     Shingrex    covid vac 3/21  #2      HENT:          No teeth     No dentures    Eyes:         Last Eye Ex 3/20   Respiratory:  Positive for shortness of breath (baseline). Negative for chest tightness. Getting urges to smoke again ! Known COPD   Cardiovascular:  Negative for chest pain. Known CAD with Stents . Atrial Fibrillation , S/P Watchman device 5/22   Gastrointestinal:  Negative for abdominal distention, blood in stool, constipation, nausea and vomiting. Upper / lower endopscopy 4/19       No Fh of ca colon . Genitourinary:  Negative for dysuria, frequency, menstrual problem, urgency and vaginal discharge. Hysterectomy   Mammogram 1/16    Musculoskeletal:  Positive for arthralgias, back pain, gait problem and myalgias. Pain Disorder was with  Pain Management    Neurological:  Positive for headaches (Chronic . off and on ). Hematological:            Did see Hematology for anemia . Bone Marrow exam Nl    Psychiatric/Behavioral:  Negative for behavioral problems and sleep disturbance. The patient is not nervous/anxious. Objective:   Physical Exam  Vitals reviewed. Constitutional:       General: She is not in acute distress. Comments:        Cardiovascular:      Rate and Rhythm: Regular rhythm. Heart sounds: Normal heart sounds. No friction rub. Pulmonary:      Breath sounds: No wheezing, rhonchi or rales. Abdominal:      Palpations: Abdomen is soft. Musculoskeletal:      Right lower leg: No edema. Left lower leg: No edema.       Comments: Neurological:      Mental Status: She is oriented to person, place, and time. Psychiatric:         Attention and Perception: Attention normal.         Mood and Affect: Affect normal.       Assessment:     Maren was seen today for diabetes, hypertension and shortness of breath. Diagnoses and all orders for this visit:    High priority for COVID-19 vaccination  Reiterated need for vaccines  ! Flu vaccine need    Need for shingles vaccine    Hypertension   BP up again . adding Lasix   -     labetalol (NORMODYNE) 200 MG tablet; Take 1 tablet by mouth 2 times daily  -     isosorbide mononitrate (IMDUR) 60 MG extended release tablet; Take 1 tablet by mouth daily  -     amLODIPine (NORVASC) 10 MG tablet; Take 1 tablet by mouth daily  -     furosemide (LASIX) 20 MG tablet; Take 1 tablet by mouth daily    SOB (shortness of breath) ? Etiology . Control BP       Pain    due to Fibromyalgia . On Lyrica . Got Ultram 11//22 cannot do refills for controlled substances     --     acetaminophen (TYLENOL) 500 MG tablet; Take 1 tablet by mouth 4 times daily as needed for Pain  -     pregabalin (LYRICA) 50 MG capsule; Take 1 capsule by mouth 3 times daily for 30 days. Pain  fall out with Dr Taya Anguiano . Needs to find new Dr for chronic pain management   stop duloxetine   -   Stage 3b chronic kidney disease ([facility]  advised to f/u with nephrology               Headaches . Nl MRI/ MRA brain . Seen Neurology for tiny meningioma 6/22 . Watchful waiting recommended . Off Ubrelvi . On  aimovig now  -            Type 2 diabetes mellitus with other circulatory complication, with long-term current use of insulin (HCC)  last  A1C 7.4    . Rechk again . Eye exam needed     -     insulin glargine (BASAGLAR KWIKPEN) 100 UNIT/ML injection pen;  Inject 8 Units into the skin 2 times daily  -        Neuropathy        Coronary artery disease involving native coronary artery of native heart without angina pectoris  -        Diabetic gastroparesis (HCC)  Control sugars .        Other hyperlipidemia  80 mg Lipitor             Anxiety  and Depression      Plan:

## 2023-01-06 ENCOUNTER — PATIENT MESSAGE (OUTPATIENT)
Dept: PRIMARY CARE CLINIC | Age: 68
End: 2023-01-06

## 2023-01-06 NOTE — TELEPHONE ENCOUNTER
From: Janice Lee  To: Dr. Smita Daigle: 1/5/2023 10:37 AM EST  Subject: Pain     Can I be accepted back and will I be able to get something for pain

## 2023-01-09 ENCOUNTER — TELEPHONE (OUTPATIENT)
Dept: ORTHOPEDIC SURGERY | Age: 68
End: 2023-01-09

## 2023-01-09 ENCOUNTER — OFFICE VISIT (OUTPATIENT)
Dept: ORTHOPEDIC SURGERY | Age: 68
End: 2023-01-09
Payer: COMMERCIAL

## 2023-01-09 VITALS — WEIGHT: 133 LBS | HEIGHT: 60 IN | RESPIRATION RATE: 18 BRPM | BODY MASS INDEX: 26.11 KG/M2

## 2023-01-09 DIAGNOSIS — F40.240 CLAUSTROPHOBIA: ICD-10-CM

## 2023-01-09 DIAGNOSIS — M54.16 LUMBAR RADICULOPATHY, RIGHT: Primary | ICD-10-CM

## 2023-01-09 DIAGNOSIS — M54.50 LOW BACK PAIN RADIATING TO RIGHT LEG: ICD-10-CM

## 2023-01-09 DIAGNOSIS — M79.604 LOW BACK PAIN RADIATING TO RIGHT LEG: ICD-10-CM

## 2023-01-09 PROCEDURE — G8428 CUR MEDS NOT DOCUMENT: HCPCS | Performed by: PHYSICIAN ASSISTANT

## 2023-01-09 PROCEDURE — G8484 FLU IMMUNIZE NO ADMIN: HCPCS | Performed by: PHYSICIAN ASSISTANT

## 2023-01-09 PROCEDURE — 93272 ECG/REVIEW INTERPRET ONLY: CPT | Performed by: INTERNAL MEDICINE

## 2023-01-09 PROCEDURE — 99213 OFFICE O/P EST LOW 20 MIN: CPT | Performed by: PHYSICIAN ASSISTANT

## 2023-01-09 PROCEDURE — 1123F ACP DISCUSS/DSCN MKR DOCD: CPT | Performed by: PHYSICIAN ASSISTANT

## 2023-01-09 PROCEDURE — G8400 PT W/DXA NO RESULTS DOC: HCPCS | Performed by: PHYSICIAN ASSISTANT

## 2023-01-09 PROCEDURE — 3017F COLORECTAL CA SCREEN DOC REV: CPT | Performed by: PHYSICIAN ASSISTANT

## 2023-01-09 PROCEDURE — 1036F TOBACCO NON-USER: CPT | Performed by: PHYSICIAN ASSISTANT

## 2023-01-09 PROCEDURE — G8417 CALC BMI ABV UP PARAM F/U: HCPCS | Performed by: PHYSICIAN ASSISTANT

## 2023-01-09 PROCEDURE — 1090F PRES/ABSN URINE INCON ASSESS: CPT | Performed by: PHYSICIAN ASSISTANT

## 2023-01-09 RX ORDER — DIAZEPAM 10 MG/1
10 TABLET ORAL PRN
Qty: 2 TABLET | Refills: 0 | Status: SHIPPED | OUTPATIENT
Start: 2023-01-09 | End: 2023-01-30

## 2023-01-09 NOTE — TELEPHONE ENCOUNTER
I called and informed patient that she is not able to do open mri due to the stent. She needs to do regular mri and requires valium. The last Valium script was cancelled because she was supposed to go to OPEN mri facility. Judi Gomes can you rewrite the rx for valium for the MRI?

## 2023-01-09 NOTE — TELEPHONE ENCOUNTER
OTHER    Subject: MISSED CALL  Patient Request: Negrito Oswald  Contact Number: [phone]    PATIENT STATES SHE JUST MISSED A CALL FROM [facility] OFFICE, AND IS RETURNING CALL. PLEASE RETURN CALL TO ABOVE NUMBER.

## 2023-01-10 NOTE — PROGRESS NOTES
Subjective:      Patient ID: Marine Torres is a 78 y.o. female who is here for follow up evaluation of low back pain. She is here today to discuss options for obtaining an MRI of her lumbar spine. She was scheduled at an open MRI facility but due to her cardiac stent placement it was deemed not safe for her to undergo open MRI due to the strength of magnet. She does have a history of undergoing a previous MRI of the brain at a [facility] facility and did not have any issues with that MRI scanner. She has received clearance from her cardiologist to undergo MRI. Anthony Adams She denies any new complaints today. She rates her back pain 10/10 VAS and right leg pain 10/10 VAS. She describes the pain as sharp that is worse with any activity and better with nothing . The leg pain radiates down the posterior latera aspect of her leg to her right foot. She reports numbness and tingling in her right and left leg. Shereports weakness of her right leg and denies bowel or bladder dysfunction. She states she can sit for a maximum of 60 and stand for a maximum 60. The pain does disrupts her sleep and requires sleeping in a reclining chair.        Review Of Systems:   CONSTITUTIONAL: Denies unexplained weight loss, fevers, chills or fatigue  NEUROLOGICAL: Denies unsteady gait or progressive weakness  MUSCULOSKELETAL: Denies joint swelling or redness  PSYCHOLOGICAL: Denies anxiety, depression   SKIN: Denies skin changes, delayed healing, rash, itching   HEMATOLOGIC: Denies easy bleeding or bruising  ENDOCRINE: Denies excessive thirst, urination, heat/cold  RESPIRATORY: Denies current dyspnea, cough  GI: Denies nausea, vomiting, diarrhea   : Denies bowel or bladder issues       Past Medical History:   Diagnosis Date    Acid reflux     Anemia     Anxiety and depression     Arthritis     Asthma     Atrial fibrillation (HCC)     CAD (coronary artery disease) 12/3/2012    Cerebral artery occlusion with cerebral infarction ([facility] [address] TIA\"\"S--right sided weakness & headache    CHF (congestive heart failure) (HCC)     Chronic kidney disease--stage III     40% kidney function    COPD (chronic obstructive pulmonary disease) (HCC)     DM2 (diabetes mellitus, type 2) (HCC)     Dysarthria     Fibromyalgia 6/7/2016    Headache(784.0) 2/19/2013    Hemisensory loss     History of blood transfusion 11/2020    pt denies having transfusion reaction    Hyperlipidemia     Hypertension     IBS (irritable bowel syndrome)     Inferior vena cava occlusion (HCC)     Keratitis     Meningioma (HCC)     MI, old     Neuropathy     Superior vena cava obstruction     Temporal arteritis ([address] 2/24/2014    Wears glasses        Family History   Problem Relation Age of Onset    Diabetes Mother     High Cholesterol Mother     Stroke Mother     Cancer Mother     High Blood Pressure Mother     No Known Problems Paternal Grandfather         lung issues        Past Surgical History:   Procedure Laterality Date    ABLATION OF DYSRHYTHMIC FOCUS  1999  and 11/2020    times 2    ARTERY BIOPSY Right 04/23/2014    RIGHT TEMPORAL ARTERY BIOPSY    CAROTID ARTERY SURGERY Left     clean up per pt    CATARACT REMOVAL Bilateral     CHOLECYSTECTOMY      COLONOSCOPY N/A 4/9/2021    COLONOSCOPY WITH BIOPSY performed by Thomas Contreras MD at [address] [place] [address] N/A 10/15/2021    COLONOSCOPY performed by Thomas Contreras MD at [place] [address] (CERVIX STATUS UNKNOWN)      JOINT REPLACEMENT Right     KNEE ARTHROSCOPY Right     PTCA  10/2019    LAD and RCA inrtervention    TUNNELED VENOUS PORT PLACEMENT      left thigh.   SMART PORT-----Removed--total of 4 port placement and removal    UPPER GASTROINTESTINAL ENDOSCOPY N/A 7/6/2020    EGD DIAGNOSTIC ONLY performed by Pastor Anuj MD at [address] History    Not on file   Tobacco Use    Smoking status: Former     Packs/day: 0.50 Years: 35.00     Pack years: 17.50     Types: Cigarettes     Quit date: 2018     Years since quittin.5    Smokeless tobacco: Never    Tobacco comments:     5/13/15 has not smoked since hospitalization - kh   Vaping Use    Vaping Use: Never used   Substance and Sexual Activity    Alcohol use: No     Alcohol/week: 0.0 standard drinks    Drug use: Never    Sexual activity: Not Currently     Partners: Male       Current Outpatient Medications   Medication Sig Dispense Refill    diazePAM (VALIUM) 10 MG tablet Take 1 tablet by mouth as needed for Anxiety (may take 45 mins prior to MRI. May repeat x 1 as needed.) for up to 2 doses. 2 tablet 0    labetalol (NORMODYNE) 200 MG tablet Take 1 tablet by mouth 2 times daily 60 tablet 3    isosorbide mononitrate (IMDUR) 60 MG extended release tablet Take 1 tablet by mouth daily 90 tablet 5    amLODIPine (NORVASC) 10 MG tablet Take 1 tablet by mouth daily 90 tablet 1    furosemide (LASIX) 20 MG tablet Take 1 tablet by mouth daily 30 tablet 1    ranolazine (RANEXA) 1000 MG extended release tablet Take 1 tablet by mouth 2 times daily 60 tablet 8    amitriptyline (ELAVIL) 10 MG tablet       DULoxetine (CYMBALTA) 60 MG extended release capsule       nitroGLYCERIN (NITROSTAT) 0.4 MG SL tablet Place 1 tablet under the tongue every 5 minutes as needed for Chest pain up to max of 3 total doses. If no relief after 1 dose, call 911. 25 tablet 3    diclofenac sodium (VOLTAREN) 1 % GEL Apply 4 g topically 4 times daily 250 g 4    acetaminophen (TYLENOL) 500 MG tablet Take 1 tablet by mouth 4 times daily as needed for Pain 120 tablet 5    pregabalin (LYRICA) 50 MG capsule Take 1 capsule by mouth 3 times daily for 30 days.  90 capsule 1    buPROPion (WELLBUTRIN SR) 150 MG extended release tablet Take 150 mg by mouth daily      fexofenadine (ALLEGRA) 180 MG tablet Take 180 mg by mouth daily      tiZANidine (ZANAFLEX) 4 MG tablet Take 4 mg by mouth daily      ondansetron (ZOFRAN-ODT) 4 MG disintegrating tablet Take 1 tablet by mouth 3 times daily as needed for Nausea or Vomiting 30 tablet 1    QUEtiapine (SEROQUEL) 25 MG tablet Take 1-2 tablets by mouth nightly 60 tablet 1    Erenumab-aooe (AIMOVIG) 140 MG/ML SOAJ Inject 140 mg into the skin every 30 days 1 Adjustable Dose Pre-filled Pen Syringe 1    omeprazole (PRILOSEC) 20 MG delayed release capsule TAKE 1 CAPSULE BY MOUTH DAILY 30 capsule 1    montelukast (SINGULAIR) 10 MG tablet TAKE 1 TABLET BY MOUTH DAILY 30 tablet 1    albuterol sulfate HFA (PROVENTIL;VENTOLIN;PROAIR) 108 (90 Base) MCG/ACT inhaler Inhale 2 puffs into the lungs every 4 hours as needed for Wheezing or Shortness of Breath 54 g 5    fluticasone-salmeterol (ADVAIR) 500-50 MCG/ACT AEPB diskus inhaler Inhale 1 puff into the lungs in the morning and 1 puff in the evening. 60 each 3    clotrimazole-betamethasone (LOTRISONE) 1-0.05 % cream Apply topically 2 times daily. 5 g 5    ASPIRIN LOW DOSE 81 MG EC tablet TAKE 1 TABLET BY MOUTH DAILY 90 tablet 5    albuterol (PROVENTIL) (2.5 MG/3ML) 0.083% nebulizer solution TAKE 3 MLS BY NEBULIZATION EVERY 4 HOURS AS NEEDED FOR WHEEZING 360 mL 5    insulin glargine (BASAGLAR KWIKPEN) 100 UNIT/ML injection pen Inject 8 Units into the skin 2 times daily 5 pen 3    docusate sodium (COLACE) 100 MG capsule Take 100 mg by mouth 2 times daily      atorvastatin (LIPITOR) 80 MG tablet TAKE 1 TABLET BY MOUTH NIGHTLY 90 tablet 5    linaclotide (LINZESS) 145 MCG capsule Take 1 capsule by mouth every morning (before breakfast) 30 capsule 5     No current facility-administered medications for this visit. Objective:     Resp 18   Ht 5' (1.524 m)   Wt 133 lb (60.3 kg)   BMI 25.97 kg/m²      GENERAL EXAM:  General Apparence: Patient is adequately groomed with no evidence of malnutrition. Orientation: The patient is oriented to time, place and person. Mood & Affect:The patient's mood and affect are appropriate.   Vascular: Examination reveals no swelling tenderness in upper or lower extremities. Good capillary refill. Lymphatic: The lymphatic examination bilaterally reveals all areas to be without enlargement or induration  Sensation: Sensation is intact without deficit  Coordination/Balance: Good coordination      LUMBAR/SACRAL EXAMINATION:  Inspection: Local inspection shows no step-off or bruising. Lumbar alignment is normal.  Sagittal and Coronal balance is neutral.      Palpation:   No evidence of tenderness at the midline. No tenderness bilaterally at the paraspinal or trochanters. There is no step-off or paraspinal spasm. Range of Motion: limited by 50% in all planes due to pain           Hip ER and IR are pain free and within normal limits. [facility] test [facility]              Strength:   Strength testing is 5/5 in all muscle groups tested. Special Tests:   Straight leg raise and crossed SLR negative. Leg length and pelvis level. Skin: There are no rashes, ulcerations or lesions. Reflexes: Reflexes are symmetrically 2+ at the patellar and ankle tendons. Clonus absent bilaterally at the feet. Gait & station: normal, patient ambulates without assistance  Additional Examinations:   RIGHT LOWER EXTREMITY: Inspection/examination of the right lower extremity does not show any tenderness, deformity or injury. Range of motion is unremarkable. There is no gross instability. There are no rashes, ulcerations or lesions. 3/5 strength with right EHL testing. Otherwise 5/5 strength of all other major motor groups. LEFT LOWER EXTREMITY:  Inspection/examination of the left lower extremity does not show any tenderness, deformity or injury. Range of motion is unremarkable. There is no gross instability. There are no rashes, ulcerations or lesions. Strength and tone are normal.      X Rays: not performed in the office today:   MRI of the lumbar spine pending.     Diagnosis:       ICD-10-CM    1. Lumbar radiculopathy, right  M54.16 diazePAM (VALIUM) 10 MG tablet      2. Claustrophobia  F40.240 diazePAM (VALIUM) 10 MG tablet      3. Low back pain radiating to right leg  M54.50 diazePAM (VALIUM) 10 MG tablet    M79.604            Assessment and Plan:       Assessment:  Low back pain and right leg pain with documented radiculopathy with weakness after right EHL testing. I had an extensive discussion with Ms. Maxine Jaime regarding the natural history, etiology, and long term consequences of her condition. I have presented reasonable alternatives to the patient's proposed care, treatment, and services. Risks and benefits of the treatment options also reviewed in detail. I have outlined a treatment plan with them. She has had full opportunity to ask her questions. I have answered them all to her satisfaction. I feel that Ms. Maxine Jaime understands our discussion today. Plan:  She is already in pain management with [facility] pain management. Further Imaging-MRI will be obtained at a [facility] facility. [facility] facilities refused MRI of both open and closed units. Valium to be prescribed for claustrophobia. Follow up-after MRI to review test results and discuss further treatment options. Call or return to clinic if these symptoms worsen or fail to improve as anticipated. Ale Mckinnon PA-C   Senior Physician Assistant   [facility]                                         Disclaimer: This note was generated with use of a verbal recognition program (DRAGON) and an attempt was made to check for errors. It is possible that there are still dictated errors within this office note. If so, please bring any significant errors to my attention for an addendum. All efforts were made to ensure that this office note is accurate.

## 2023-01-12 DIAGNOSIS — I10 ESSENTIAL HYPERTENSION: ICD-10-CM

## 2023-01-13 RX ORDER — LABETALOL 200 MG/1
200 TABLET, FILM COATED ORAL 2 TIMES DAILY
Qty: 60 TABLET | Refills: 3 | OUTPATIENT
Start: 2023-01-13

## 2023-01-13 NOTE — TELEPHONE ENCOUNTER
Medication:   Requested Prescriptions     Pending Prescriptions Disp Refills    labetalol (NORMODYNE) 200 MG tablet [Pharmacy Med Name: LABETALOL  MG TABS 200 Tablet] 60 tablet 3     Sig: TAKE 1 TABLET BY MOUTH 2 TIMES DAILY        Last Filled:      Patient Phone Number: [phone] (home)     Last appt: 1/5/2023   Next appt: 2/16/2023    Last OARRS:   RX Monitoring 12/28/2022   Attestation -   Periodic Controlled Substance Monitoring Possible medication side effects, risk of tolerance/dependence & alternative treatments discussed.

## 2023-01-15 DIAGNOSIS — R11.0 NAUSEA: ICD-10-CM

## 2023-01-16 RX ORDER — ONDANSETRON 4 MG/1
4 TABLET, ORALLY DISINTEGRATING ORAL 3 TIMES DAILY PRN
Qty: 30 TABLET | Refills: 1 | Status: SHIPPED | OUTPATIENT
Start: 2023-01-16

## 2023-01-16 NOTE — TELEPHONE ENCOUNTER
Medication:   Requested Prescriptions     Pending Prescriptions Disp Refills    ondansetron (ZOFRAN-ODT) 4 MG disintegrating tablet [Pharmacy Med Name: ONDANSETRON 4 MG ODT 4 Tablet] 30 tablet 1     Sig: TAKE 1 TABLET BY MOUTH 3 TIMES DAILY AS NEEDED FOR NAUSEA OR VOMITING        Last Filled:      Patient Phone Number: [phone] (home)     Last appt: 1/5/2023   Next appt: 2/16/2023    Last OARRS:   RX Monitoring 12/28/2022   Attestation -   Periodic Controlled Substance Monitoring Possible medication side effects, risk of tolerance/dependence & alternative treatments discussed.

## 2023-01-17 ENCOUNTER — PATIENT MESSAGE (OUTPATIENT)
Dept: PAIN MANAGEMENT | Age: 68
End: 2023-01-17

## 2023-01-17 NOTE — TELEPHONE ENCOUNTER
From: Alex Lewis  Sent: 1/17/2023 12:26 PM EST  To: [facility] Clinical Staff  Subject: Dr    The Dr that I was referred to at [facility] got the referral but he is refusing to see me because of the tramdol so what do I do now

## 2023-01-23 DIAGNOSIS — R11.0 NAUSEA: ICD-10-CM

## 2023-01-23 RX ORDER — ONDANSETRON 4 MG/1
4 TABLET, ORALLY DISINTEGRATING ORAL 3 TIMES DAILY PRN
Qty: 30 TABLET | Refills: 1 | OUTPATIENT
Start: 2023-01-23

## 2023-02-02 ENCOUNTER — CARE COORDINATION (OUTPATIENT)
Dept: CARE COORDINATION | Age: 68
End: 2023-02-02

## 2023-02-02 ENCOUNTER — PATIENT MESSAGE (OUTPATIENT)
Dept: PRIMARY CARE CLINIC | Age: 68
End: 2023-02-02

## 2023-02-02 NOTE — TELEPHONE ENCOUNTER
From: Gabriel Gibbons  To: Dr. Jeff Johnson: 2/2/2023 9:57 AM EST  Subject: Referral     Need a referral for a ob GYN Dr and number to call to schedule a [facility]

## 2023-02-03 DIAGNOSIS — Z12.39 BREAST CANCER SCREENING, HIGH RISK PATIENT: Primary | ICD-10-CM

## 2023-02-08 NOTE — ADDENDUM NOTE
Addended by: Doretha Cranker on: 8/23/2021 09:01 AM     Modules accepted: Orders normal... Well appearing, awake, alert, oriented to person, place, time/situation and in no apparent distress.

## 2023-02-12 DIAGNOSIS — I10 ESSENTIAL HYPERTENSION: ICD-10-CM

## 2023-02-13 DIAGNOSIS — I25.118 CORONARY ARTERY DISEASE OF NATIVE ARTERY OF NATIVE HEART WITH STABLE ANGINA PECTORIS (HCC): ICD-10-CM

## 2023-02-13 DIAGNOSIS — I48.0 PAROXYSMAL ATRIAL FIBRILLATION (HCC): Primary | ICD-10-CM

## 2023-02-13 DIAGNOSIS — I48.0 PAROXYSMAL A-FIB (HCC): ICD-10-CM

## 2023-02-13 RX ORDER — FUROSEMIDE 20 MG/1
20 TABLET ORAL DAILY
Qty: 30 TABLET | Refills: 1 | Status: ON HOLD | OUTPATIENT
Start: 2023-02-13

## 2023-02-17 ENCOUNTER — APPOINTMENT (OUTPATIENT)
Dept: GENERAL RADIOLOGY | Age: 68
DRG: 305 | End: 2023-02-17
Payer: COMMERCIAL

## 2023-02-17 ENCOUNTER — HOSPITAL ENCOUNTER (INPATIENT)
Age: 68
LOS: 4 days | Discharge: HOME | DRG: 305 | End: 2023-02-22
Attending: EMERGENCY MEDICINE | Admitting: INTERNAL MEDICINE
Payer: COMMERCIAL

## 2023-02-17 DIAGNOSIS — R07.9 CHEST PAIN, UNSPECIFIED TYPE: Primary | ICD-10-CM

## 2023-02-17 LAB
A/G RATIO: 0.7 (ref 1.1–2.2)
ALBUMIN SERPL-MCNC: 3.2 G/DL (ref 3.4–5)
ALP BLD-CCNC: 160 U/L (ref 40–129)
ALT SERPL-CCNC: 9 U/L (ref 10–40)
ANION GAP SERPL CALCULATED.3IONS-SCNC: 14 MMOL/L (ref 3–16)
AST SERPL-CCNC: 27 U/L (ref 15–37)
BASOPHILS ABSOLUTE: 0 K/UL (ref 0–0.2)
BASOPHILS RELATIVE PERCENT: 0.4 %
BILIRUB SERPL-MCNC: <0.2 MG/DL (ref 0–1)
BUN BLDV-MCNC: 21 MG/DL (ref 7–20)
CALCIUM SERPL-MCNC: 9.1 MG/DL (ref 8.3–10.6)
CHLORIDE BLD-SCNC: 101 MMOL/L (ref 99–110)
CO2: 18 MMOL/L (ref 21–32)
CREAT SERPL-MCNC: 1.3 MG/DL (ref 0.6–1.2)
EKG ATRIAL RATE: 80 BPM
EKG DIAGNOSIS: NORMAL
EKG P AXIS: -10 DEGREES
EKG P-R INTERVAL: 116 MS
EKG Q-T INTERVAL: 396 MS
EKG QRS DURATION: 88 MS
EKG QTC CALCULATION (BAZETT): 456 MS
EKG R AXIS: 28 DEGREES
EKG T AXIS: 96 DEGREES
EKG VENTRICULAR RATE: 80 BPM
EOSINOPHILS ABSOLUTE: 0.4 K/UL (ref 0–0.6)
EOSINOPHILS RELATIVE PERCENT: 5.7 %
GFR SERPL CREATININE-BSD FRML MDRD: 45 ML/MIN/{1.73_M2}
GLUCOSE BLD-MCNC: 209 MG/DL (ref 70–99)
GLUCOSE BLD-MCNC: 218 MG/DL (ref 70–99)
GLUCOSE BLD-MCNC: 263 MG/DL (ref 70–99)
HCT VFR BLD CALC: 33.9 % (ref 36–48)
HEMOGLOBIN: 10.4 G/DL (ref 12–16)
LYMPHOCYTES ABSOLUTE: 1.6 K/UL (ref 1–5.1)
LYMPHOCYTES RELATIVE PERCENT: 24.3 %
MCH RBC QN AUTO: 30 PG (ref 26–34)
MCHC RBC AUTO-ENTMCNC: 30.8 G/DL (ref 31–36)
MCV RBC AUTO: 97.7 FL (ref 80–100)
MONOCYTES ABSOLUTE: 0.3 K/UL (ref 0–1.3)
MONOCYTES RELATIVE PERCENT: 4.8 %
NEUTROPHILS ABSOLUTE: 4.4 K/UL (ref 1.7–7.7)
NEUTROPHILS RELATIVE PERCENT: 64.8 %
PDW BLD-RTO: 16.2 % (ref 12.4–15.4)
PERFORMED ON: ABNORMAL
PERFORMED ON: ABNORMAL
PLATELET # BLD: 151 K/UL (ref 135–450)
PLATELET SLIDE REVIEW: ADEQUATE
PMV BLD AUTO: 8.4 FL (ref 5–10.5)
POTASSIUM REFLEX MAGNESIUM: 5.3 MMOL/L (ref 3.5–5.1)
PRO-BNP: 2503 PG/ML (ref 0–124)
RBC # BLD: 3.47 M/UL (ref 4–5.2)
SLIDE REVIEW: ABNORMAL
SODIUM BLD-SCNC: 133 MMOL/L (ref 136–145)
TOTAL PROTEIN: 7.6 G/DL (ref 6.4–8.2)
TROPONIN: 0.02 NG/ML
TROPONIN: 0.03 NG/ML
TROPONIN: 0.03 NG/ML
TROPONIN: <0.01 NG/ML
WBC # BLD: 6.7 K/UL (ref 4–11)

## 2023-02-17 PROCEDURE — 84484 ASSAY OF TROPONIN QUANT: CPT

## 2023-02-17 PROCEDURE — 96374 THER/PROPH/DIAG INJ IV PUSH: CPT

## 2023-02-17 PROCEDURE — 6370000000 HC RX 637 (ALT 250 FOR IP): Performed by: INTERNAL MEDICINE

## 2023-02-17 PROCEDURE — 83036 HEMOGLOBIN GLYCOSYLATED A1C: CPT

## 2023-02-17 PROCEDURE — 36415 COLL VENOUS BLD VENIPUNCTURE: CPT

## 2023-02-17 PROCEDURE — 6370000000 HC RX 637 (ALT 250 FOR IP): Performed by: EMERGENCY MEDICINE

## 2023-02-17 PROCEDURE — 99285 EMERGENCY DEPT VISIT HI MDM: CPT

## 2023-02-17 PROCEDURE — 6360000002 HC RX W HCPCS: Performed by: INTERNAL MEDICINE

## 2023-02-17 PROCEDURE — 85025 COMPLETE CBC W/AUTO DIFF WBC: CPT

## 2023-02-17 PROCEDURE — 83880 ASSAY OF NATRIURETIC PEPTIDE: CPT

## 2023-02-17 PROCEDURE — 94640 AIRWAY INHALATION TREATMENT: CPT

## 2023-02-17 PROCEDURE — G0378 HOSPITAL OBSERVATION PER HR: HCPCS

## 2023-02-17 PROCEDURE — 80053 COMPREHEN METABOLIC PANEL: CPT

## 2023-02-17 PROCEDURE — 71045 X-RAY EXAM CHEST 1 VIEW: CPT

## 2023-02-17 PROCEDURE — 6360000002 HC RX W HCPCS: Performed by: EMERGENCY MEDICINE

## 2023-02-17 PROCEDURE — 2580000003 HC RX 258: Performed by: INTERNAL MEDICINE

## 2023-02-17 PROCEDURE — 99215 OFFICE O/P EST HI 40 MIN: CPT | Performed by: INTERNAL MEDICINE

## 2023-02-17 RX ORDER — SODIUM CHLORIDE 0.9 % (FLUSH) 0.9 %
10 SYRINGE (ML) INJECTION PRN
Status: DISCONTINUED | OUTPATIENT
Start: 2023-02-17 | End: 2023-02-22 | Stop reason: HOSPADM

## 2023-02-17 RX ORDER — LUBIPROSTONE 24 UG/1
24 CAPSULE, GELATIN COATED ORAL 2 TIMES DAILY PRN
COMMUNITY

## 2023-02-17 RX ORDER — INSULIN LISPRO 100 [IU]/ML
0-4 INJECTION, SOLUTION INTRAVENOUS; SUBCUTANEOUS
Status: DISCONTINUED | OUTPATIENT
Start: 2023-02-17 | End: 2023-02-22 | Stop reason: HOSPADM

## 2023-02-17 RX ORDER — MONTELUKAST SODIUM 10 MG/1
10 TABLET ORAL DAILY
Status: DISCONTINUED | OUTPATIENT
Start: 2023-02-17 | End: 2023-02-22 | Stop reason: HOSPADM

## 2023-02-17 RX ORDER — ISOSORBIDE MONONITRATE 30 MG/1
60 TABLET, EXTENDED RELEASE ORAL DAILY
Status: DISCONTINUED | OUTPATIENT
Start: 2023-02-17 | End: 2023-02-22 | Stop reason: HOSPADM

## 2023-02-17 RX ORDER — FERROUS SULFATE 325(65) MG
325 TABLET ORAL
COMMUNITY

## 2023-02-17 RX ORDER — DULOXETIN HYDROCHLORIDE 60 MG/1
60 CAPSULE, DELAYED RELEASE ORAL DAILY
Status: DISCONTINUED | OUTPATIENT
Start: 2023-02-18 | End: 2023-02-22 | Stop reason: HOSPADM

## 2023-02-17 RX ORDER — ACETAMINOPHEN 650 MG/1
650 SUPPOSITORY RECTAL EVERY 6 HOURS PRN
Status: DISCONTINUED | OUTPATIENT
Start: 2023-02-17 | End: 2023-02-22 | Stop reason: HOSPADM

## 2023-02-17 RX ORDER — NITROGLYCERIN 0.4 MG/1
0.4 TABLET SUBLINGUAL EVERY 5 MIN PRN
Status: DISCONTINUED | OUTPATIENT
Start: 2023-02-17 | End: 2023-02-17 | Stop reason: SDUPTHER

## 2023-02-17 RX ORDER — TIZANIDINE 4 MG/1
4 TABLET ORAL 2 TIMES DAILY
Status: DISCONTINUED | OUTPATIENT
Start: 2023-02-17 | End: 2023-02-22 | Stop reason: HOSPADM

## 2023-02-17 RX ORDER — LINACLOTIDE 290 UG/1
290 CAPSULE, GELATIN COATED ORAL
COMMUNITY

## 2023-02-17 RX ORDER — CETIRIZINE HYDROCHLORIDE 10 MG/1
10 TABLET ORAL DAILY
Status: DISCONTINUED | OUTPATIENT
Start: 2023-02-17 | End: 2023-02-22 | Stop reason: HOSPADM

## 2023-02-17 RX ORDER — NITROGLYCERIN 0.4 MG/1
0.4 TABLET SUBLINGUAL EVERY 5 MIN PRN
Status: COMPLETED | OUTPATIENT
Start: 2023-02-17 | End: 2023-02-17

## 2023-02-17 RX ORDER — HYDROXYZINE HYDROCHLORIDE 25 MG/1
25 TABLET, FILM COATED ORAL 3 TIMES DAILY PRN
COMMUNITY

## 2023-02-17 RX ORDER — ALBUTEROL SULFATE 2.5 MG/3ML
2.5 SOLUTION RESPIRATORY (INHALATION) EVERY 4 HOURS PRN
COMMUNITY

## 2023-02-17 RX ORDER — QUETIAPINE FUMARATE 25 MG/1
25 TABLET, FILM COATED ORAL NIGHTLY
Status: DISCONTINUED | OUTPATIENT
Start: 2023-02-17 | End: 2023-02-17

## 2023-02-17 RX ORDER — FUROSEMIDE 20 MG/1
20 TABLET ORAL DAILY
Status: DISCONTINUED | OUTPATIENT
Start: 2023-02-18 | End: 2023-02-22 | Stop reason: HOSPADM

## 2023-02-17 RX ORDER — LABETALOL 100 MG/1
300 TABLET, FILM COATED ORAL EVERY 8 HOURS SCHEDULED
Status: DISCONTINUED | OUTPATIENT
Start: 2023-02-17 | End: 2023-02-18

## 2023-02-17 RX ORDER — AMITRIPTYLINE HYDROCHLORIDE 10 MG/1
30 TABLET, FILM COATED ORAL NIGHTLY
Status: DISCONTINUED | OUTPATIENT
Start: 2023-02-17 | End: 2023-02-22 | Stop reason: HOSPADM

## 2023-02-17 RX ORDER — ATORVASTATIN CALCIUM 40 MG/1
80 TABLET, FILM COATED ORAL NIGHTLY
Status: DISCONTINUED | OUTPATIENT
Start: 2023-02-17 | End: 2023-02-22 | Stop reason: HOSPADM

## 2023-02-17 RX ORDER — ASPIRIN 81 MG/1
81 TABLET, CHEWABLE ORAL DAILY
Status: DISCONTINUED | OUTPATIENT
Start: 2023-02-18 | End: 2023-02-22 | Stop reason: HOSPADM

## 2023-02-17 RX ORDER — ACETAMINOPHEN 325 MG/1
650 TABLET ORAL EVERY 6 HOURS PRN
Status: DISCONTINUED | OUTPATIENT
Start: 2023-02-17 | End: 2023-02-22 | Stop reason: HOSPADM

## 2023-02-17 RX ORDER — MORPHINE SULFATE 2 MG/ML
2 INJECTION, SOLUTION INTRAMUSCULAR; INTRAVENOUS EVERY 4 HOURS PRN
Status: DISCONTINUED | OUTPATIENT
Start: 2023-02-17 | End: 2023-02-22

## 2023-02-17 RX ORDER — ASPIRIN 81 MG/1
243 TABLET, CHEWABLE ORAL ONCE
Status: DISCONTINUED | OUTPATIENT
Start: 2023-02-17 | End: 2023-02-17 | Stop reason: ALTCHOICE

## 2023-02-17 RX ORDER — DOCUSATE SODIUM 100 MG/1
100 CAPSULE, LIQUID FILLED ORAL 2 TIMES DAILY
Status: DISCONTINUED | OUTPATIENT
Start: 2023-02-17 | End: 2023-02-22 | Stop reason: HOSPADM

## 2023-02-17 RX ORDER — ALBUTEROL SULFATE 90 UG/1
2 AEROSOL, METERED RESPIRATORY (INHALATION) EVERY 4 HOURS PRN
COMMUNITY

## 2023-02-17 RX ORDER — INSULIN LISPRO 100 [IU]/ML
0-4 INJECTION, SOLUTION INTRAVENOUS; SUBCUTANEOUS NIGHTLY
Status: DISCONTINUED | OUTPATIENT
Start: 2023-02-17 | End: 2023-02-22 | Stop reason: HOSPADM

## 2023-02-17 RX ORDER — AMLODIPINE BESYLATE 5 MG/1
10 TABLET ORAL DAILY
Status: DISCONTINUED | OUTPATIENT
Start: 2023-02-18 | End: 2023-02-21

## 2023-02-17 RX ORDER — PREGABALIN 25 MG/1
50 CAPSULE ORAL 3 TIMES DAILY
Status: DISCONTINUED | OUTPATIENT
Start: 2023-02-17 | End: 2023-02-17

## 2023-02-17 RX ORDER — FUROSEMIDE 10 MG/ML
40 INJECTION INTRAMUSCULAR; INTRAVENOUS ONCE
Status: COMPLETED | OUTPATIENT
Start: 2023-02-17 | End: 2023-02-17

## 2023-02-17 RX ORDER — TIZANIDINE 4 MG/1
4 TABLET ORAL DAILY
Status: DISCONTINUED | OUTPATIENT
Start: 2023-02-17 | End: 2023-02-17

## 2023-02-17 RX ORDER — ENOXAPARIN SODIUM 100 MG/ML
40 INJECTION SUBCUTANEOUS NIGHTLY
Status: DISCONTINUED | OUTPATIENT
Start: 2023-02-17 | End: 2023-02-18

## 2023-02-17 RX ORDER — SODIUM CHLORIDE 0.9 % (FLUSH) 0.9 %
10 SYRINGE (ML) INJECTION EVERY 12 HOURS SCHEDULED
Status: DISCONTINUED | OUTPATIENT
Start: 2023-02-17 | End: 2023-02-22 | Stop reason: HOSPADM

## 2023-02-17 RX ORDER — ONDANSETRON 2 MG/ML
4 INJECTION INTRAMUSCULAR; INTRAVENOUS ONCE
Status: COMPLETED | OUTPATIENT
Start: 2023-02-17 | End: 2023-02-17

## 2023-02-17 RX ORDER — CALCITRIOL 0.25 UG/1
0.25 CAPSULE, LIQUID FILLED ORAL WEEKLY
COMMUNITY

## 2023-02-17 RX ORDER — INSULIN GLARGINE 100 [IU]/ML
8 INJECTION, SOLUTION SUBCUTANEOUS 2 TIMES DAILY
Status: DISCONTINUED | OUTPATIENT
Start: 2023-02-17 | End: 2023-02-22 | Stop reason: HOSPADM

## 2023-02-17 RX ORDER — QUETIAPINE FUMARATE 25 MG/1
50 TABLET, FILM COATED ORAL NIGHTLY
Status: DISCONTINUED | OUTPATIENT
Start: 2023-02-17 | End: 2023-02-22 | Stop reason: HOSPADM

## 2023-02-17 RX ORDER — LABETALOL 100 MG/1
200 TABLET, FILM COATED ORAL 2 TIMES DAILY
Status: DISCONTINUED | OUTPATIENT
Start: 2023-02-17 | End: 2023-02-17

## 2023-02-17 RX ORDER — POLYETHYLENE GLYCOL 3350 17 G/17G
17 POWDER, FOR SOLUTION ORAL DAILY PRN
Status: DISCONTINUED | OUTPATIENT
Start: 2023-02-17 | End: 2023-02-22 | Stop reason: HOSPADM

## 2023-02-17 RX ORDER — LABETALOL HYDROCHLORIDE 5 MG/ML
10 INJECTION, SOLUTION INTRAVENOUS EVERY 4 HOURS PRN
Status: DISCONTINUED | OUTPATIENT
Start: 2023-02-17 | End: 2023-02-18

## 2023-02-17 RX ORDER — SODIUM CHLORIDE 9 MG/ML
INJECTION, SOLUTION INTRAVENOUS PRN
Status: DISCONTINUED | OUTPATIENT
Start: 2023-02-17 | End: 2023-02-22 | Stop reason: HOSPADM

## 2023-02-17 RX ORDER — RANOLAZINE 500 MG/1
1000 TABLET, EXTENDED RELEASE ORAL 2 TIMES DAILY
Status: DISCONTINUED | OUTPATIENT
Start: 2023-02-17 | End: 2023-02-22 | Stop reason: HOSPADM

## 2023-02-17 RX ORDER — ASPIRIN 81 MG/1
243 TABLET, CHEWABLE ORAL ONCE
Status: DISCONTINUED | OUTPATIENT
Start: 2023-02-17 | End: 2023-02-17

## 2023-02-17 RX ORDER — NITROGLYCERIN 20 MG/100ML
5-200 INJECTION INTRAVENOUS CONTINUOUS
Status: DISCONTINUED | OUTPATIENT
Start: 2023-02-17 | End: 2023-02-17

## 2023-02-17 RX ORDER — PANTOPRAZOLE SODIUM 40 MG/1
40 TABLET, DELAYED RELEASE ORAL
Status: DISCONTINUED | OUTPATIENT
Start: 2023-02-18 | End: 2023-02-22 | Stop reason: HOSPADM

## 2023-02-17 RX ORDER — DEXTROSE MONOHYDRATE 100 MG/ML
INJECTION, SOLUTION INTRAVENOUS CONTINUOUS PRN
Status: DISCONTINUED | OUTPATIENT
Start: 2023-02-17 | End: 2023-02-22 | Stop reason: HOSPADM

## 2023-02-17 RX ORDER — BUPROPION HYDROCHLORIDE 150 MG/1
150 TABLET, EXTENDED RELEASE ORAL DAILY
Status: DISCONTINUED | OUTPATIENT
Start: 2023-02-18 | End: 2023-02-22 | Stop reason: HOSPADM

## 2023-02-17 RX ADMIN — FUROSEMIDE 40 MG: 10 INJECTION, SOLUTION INTRAMUSCULAR; INTRAVENOUS at 14:57

## 2023-02-17 RX ADMIN — NITROGLYCERIN 0.4 MG: 0.4 TABLET, ORALLY DISINTEGRATING SUBLINGUAL at 21:09

## 2023-02-17 RX ADMIN — LABETALOL HYDROCHLORIDE 300 MG: 100 TABLET, FILM COATED ORAL at 21:10

## 2023-02-17 RX ADMIN — AMITRIPTYLINE HYDROCHLORIDE 30 MG: 10 TABLET, FILM COATED ORAL at 21:10

## 2023-02-17 RX ADMIN — ATORVASTATIN CALCIUM 80 MG: 40 TABLET, FILM COATED ORAL at 21:10

## 2023-02-17 RX ADMIN — INSULIN GLARGINE 8 UNITS: 100 INJECTION, SOLUTION SUBCUTANEOUS at 21:18

## 2023-02-17 RX ADMIN — QUETIAPINE FUMARATE 50 MG: 25 TABLET ORAL at 21:10

## 2023-02-17 RX ADMIN — SODIUM CHLORIDE, PRESERVATIVE FREE 10 ML: 5 INJECTION INTRAVENOUS at 21:11

## 2023-02-17 RX ADMIN — Medication 2 PUFF: at 12:18

## 2023-02-17 RX ADMIN — MORPHINE SULFATE 2 MG: 2 INJECTION, SOLUTION INTRAMUSCULAR; INTRAVENOUS at 19:50

## 2023-02-17 RX ADMIN — ONDANSETRON 4 MG: 2 INJECTION INTRAMUSCULAR; INTRAVENOUS at 11:55

## 2023-02-17 RX ADMIN — NITROGLYCERIN 0.4 MG: 0.4 TABLET, ORALLY DISINTEGRATING SUBLINGUAL at 21:04

## 2023-02-17 RX ADMIN — MORPHINE SULFATE 2 MG: 2 INJECTION, SOLUTION INTRAMUSCULAR; INTRAVENOUS at 13:45

## 2023-02-17 RX ADMIN — NITROGLYCERIN 0.4 MG: 0.4 TABLET, ORALLY DISINTEGRATING SUBLINGUAL at 10:37

## 2023-02-17 RX ADMIN — NITROGLYCERIN 0.4 MG: 0.4 TABLET, ORALLY DISINTEGRATING SUBLINGUAL at 21:14

## 2023-02-17 RX ADMIN — NITROGLYCERIN 0.4 MG: 0.4 TABLET, ORALLY DISINTEGRATING SUBLINGUAL at 10:48

## 2023-02-17 RX ADMIN — LABETALOL HYDROCHLORIDE 300 MG: 100 TABLET, FILM COATED ORAL at 14:57

## 2023-02-17 RX ADMIN — INSULIN LISPRO 2 UNITS: 100 INJECTION, SOLUTION INTRAVENOUS; SUBCUTANEOUS at 17:47

## 2023-02-17 RX ADMIN — TIZANIDINE 4 MG: 4 TABLET ORAL at 21:10

## 2023-02-17 RX ADMIN — ENOXAPARIN SODIUM 40 MG: 100 INJECTION SUBCUTANEOUS at 21:11

## 2023-02-17 RX ADMIN — Medication 2 PUFF: at 19:26

## 2023-02-17 RX ADMIN — CETIRIZINE HYDROCHLORIDE 10 MG: 10 TABLET, FILM COATED ORAL at 14:57

## 2023-02-17 RX ADMIN — MONTELUKAST 10 MG: 10 TABLET, FILM COATED ORAL at 14:56

## 2023-02-17 RX ADMIN — RANOLAZINE 1000 MG: 500 TABLET, FILM COATED, EXTENDED RELEASE ORAL at 21:10

## 2023-02-17 RX ADMIN — DOCUSATE SODIUM 100 MG: 100 CAPSULE, LIQUID FILLED ORAL at 21:10

## 2023-02-17 RX ADMIN — MORPHINE SULFATE 2 MG: 2 INJECTION, SOLUTION INTRAMUSCULAR; INTRAVENOUS at 23:49

## 2023-02-17 ASSESSMENT — PAIN DESCRIPTION - ORIENTATION
ORIENTATION: LEFT
ORIENTATION: LEFT
ORIENTATION: LEFT;MID

## 2023-02-17 ASSESSMENT — PAIN SCALES - GENERAL
PAINLEVEL_OUTOF10: 9
PAINLEVEL_OUTOF10: 6
PAINLEVEL_OUTOF10: 8
PAINLEVEL_OUTOF10: 9
PAINLEVEL_OUTOF10: 10
PAINLEVEL_OUTOF10: 8
PAINLEVEL_OUTOF10: 0

## 2023-02-17 ASSESSMENT — PAIN DESCRIPTION - ONSET: ONSET: ON-GOING

## 2023-02-17 ASSESSMENT — PAIN DESCRIPTION - PAIN TYPE: TYPE: ACUTE PAIN

## 2023-02-17 ASSESSMENT — PAIN DESCRIPTION - LOCATION
LOCATION: CHEST

## 2023-02-17 ASSESSMENT — PAIN DESCRIPTION - DESCRIPTORS
DESCRIPTORS: PRESSURE
DESCRIPTORS: HEAVINESS
DESCRIPTORS: HEAVINESS

## 2023-02-17 ASSESSMENT — PAIN DESCRIPTION - FREQUENCY: FREQUENCY: CONTINUOUS

## 2023-02-17 ASSESSMENT — PAIN - FUNCTIONAL ASSESSMENT: PAIN_FUNCTIONAL_ASSESSMENT: 0-10

## 2023-02-17 NOTE — ED NOTES
Khris Rivas at bedside attempting ultrasound IV access w/o successful placement; was able to get blood from IV site prior to IV blowing w/ the flush;     EDMD made aware of unsuccessful attempt but that blood work was sent;     EDMD also made aware that the first nitro given had some relief but patient states she took 3 nitro's at home 5 minutes apart w/o major relief; EMS gave 1 nitro w/ some relief    This RN gave 1 nitro w/ slight relief and informed EDMD; he ordered to do 1 more round and then will re-eval to see if a nitro gtt will be needed;      Tanvi Callahan, YOLETTE  02/17/23 [id number]

## 2023-02-17 NOTE — PROGRESS NOTES
4 Eyes Skin Assessment     NAME:  Alberto Bee  YOB: 1955  MEDICAL RECORD NUMBER:  [id number]    The patient is being assessed for  Admission    I agree that One RN has performed a thorough Head to Toe Skin Assessment on the patient. ALL assessment sites listed below have been assessed. Areas assessed by both nurses:    Head, Face, Ears, Shoulders, Back, Chest, Arms, Elbows, Hands, Sacrum. Buttock, Coccyx, Ischium, and Legs. Feet and Heels        Does the Patient have a Wound?  No noted wound(s)       Rakan Prevention initiated by RN: No   Wound Care Orders initiated by RN: No    Pressure Injury (Stage 3,4, Unstageable, DTI, NWPT, and Complex wounds) if present, place referral order by RN under : No    New and Established Ostomies, if present place, referral order under : No      Nurse 1 eSignature: Electronically signed by Laurel Del Cid RN on 23 at 3:32 PM EST    **SHARE this note so that the co-signing nurse can place an eSignature**    Nurse 2 eSignature: Electronically signed by Michelle Lake RN on 23 at 3:35 PM EST

## 2023-02-17 NOTE — H&P
Hospital Medicine History and Physical    2/17/2023    Date of Admission: 2/17/2023    Date of Service: Pt seen/examined on 2/17/2023 and admitted to observation. Assessment/plan:  Chest pain. Initial EKG and troponin unremarkable. Patient with negative stress test in August 2022. No significant improvement with nitroglycerin. Continue antiplatelet therapy, beta-blocker, statin. Trend troponin. Telemetry monitoring. Cardiology consult to assist with evaluation. As needed morphine for pain; minimize narcotics, especially with history of fibromyalgia. Hypertensive urgency. Likely contributing to pain. Adjustment has been made to antihypertensive medications. As needed IV labetalol for systolic blood pressure greater than 150 mmHg. Shortness of breath, exertional.  Give 40 mg of IV Lasix x1, especially with questionable pleural effusion noted on chest imaging. Resume home dose of Lasix 20 mg daily tomorrow. Monitor pulse oximeter. Increase ambulation. Mild hyperkalemia, potassium 5.3. Give 40 mg of IV Lasix. Recheck potassium tomorrow morning. Other comorbidities: history of anxiety and depression, gastroesophageal reflux disease, normocytic anemia, CKD stage III, COPD, paroxysmal atrial fibrillation (on anticoagulation), chronic metabolic acidosis, asthma, fibromyalgia, hyperlipidemia, essential hypertension, IBS, temporal arteritis, negative stress test in August 2022. Activities: Up with assist  Prophylaxis: Subcutaneous Lovenox  Code status: Full code    ==========================================================  Chief complaint:  Chief Complaint   Patient presents with    Chest Pain     Onset around 0700; ems gave 324 ASA and 1 NTG w/ initial relief but pain has returned; middle and radiating to the left; History of Presenting Illness:   This is a pleasant 11year-old female with history of anxiety and depression, gastroesophageal reflux disease, normocytic anemia, CKD stage III, COPD, paroxysmal atrial fibrillation (on anticoagulation), chronic metabolic acidosis, asthma, fibromyalgia, hyperlipidemia, essential hypertension, IBS, temporal arteritis, negative stress test in August 2022, who presents to the emergency room with complaints of substernal chest discomfort that woke the morning of 2/17/2023. She also reports associated lightheadedness. She has had some shortness of breath over the past week, mostly with exertion. In triage, she reports some improvement of chest discomfort with nitroglycerin,; however, at the time of my evaluation, she maintains that chest discomfort did not improve with nitroglycerin. Troponin is negative. EKG with no ischemia. Chest x-ray with trace fissural thickening concerning for possible pulmonary edema. Patient has been admitted for further evaluation and management.     Past Medical History:      Diagnosis Date    Acid reflux     Anemia     Anxiety and depression     Arthritis     Asthma     Atrial fibrillation (HCC)     CAD (coronary artery disease) 12/3/2012    Cerebral artery occlusion with cerebral infarction [facility])     TIA\"\"S--right sided weakness & headache    CHF (congestive heart failure) ([facility])     Chronic kidney disease--stage III     40% kidney function    COPD (chronic obstructive pulmonary disease) (HCC)     DM2 (diabetes mellitus, type 2) ([address]     Dysarthria     Fibromyalgia 6/7/2016    Headache(784.0) 2/19/2013    Hemisensory loss     History of blood transfusion 11/2020    pt denies having transfusion reaction    Hyperlipidemia     Hypertension     IBS (irritable bowel syndrome)     Inferior vena cava occlusion (HCC)     Keratitis     Meningioma (HCC)     MI, old     Neuropathy     Superior vena cava obstruction     Temporal arteritis ([address] 2/24/2014    Wears glasses        Past Surgical History:      Procedure Laterality Date    ABLATION OF DYSRHYTHMIC FOCUS  1999  and 11/2020    times 2    ARTERY BIOPSY Right 04/23/2014 RIGHT TEMPORAL ARTERY BIOPSY    CAROTID ARTERY SURGERY Left     clean up per pt    CATARACT REMOVAL Bilateral     CHOLECYSTECTOMY      COLONOSCOPY N/A 4/9/2021    COLONOSCOPY WITH BIOPSY performed by Harsh Maldonado MD at [address] [facility] N/A 10/15/2021    COLONOSCOPY performed by Harsh Maldonado MD at [facility] (CERVIX STATUS UNKNOWN)      JOINT REPLACEMENT Right     KNEE ARTHROSCOPY Right     PTCA  10/2019    LAD and RCA inrtervention    TUNNELED VENOUS PORT PLACEMENT      left thigh. SMART PORT-----Removed--total of 4 port placement and removal    UPPER GASTROINTESTINAL ENDOSCOPY N/A 7/6/2020    EGD DIAGNOSTIC ONLY performed by Jeovanny Alston MD at [address] [place] [address]       Medications (prior to admission):  Prior to Admission medications    Medication Sig Start Date End Date Taking? Authorizing Provider   furosemide (LASIX) 20 MG tablet TAKE 1 TABLET BY MOUTH DAILY 2/13/23   Omari Padron MD   ondansetron (ZOFRAN-ODT) 4 MG disintegrating tablet TAKE 1 TABLET BY MOUTH 3 TIMES DAILY AS NEEDED FOR NAUSEA OR VOMITING 1/16/23   Omari Padron MD   labetalol (NORMODYNE) 200 MG tablet Take 1 tablet by mouth 2 times daily 1/5/23   Omari Padron MD   isosorbide mononitrate (IMDUR) 60 MG extended release tablet Take 1 tablet by mouth daily 1/5/23   Omari Padron MD   amLODIPine (NORVASC) 10 MG tablet Take 1 tablet by mouth daily 1/5/23   Omari Padron MD   ranolazine (RANEXA) 1000 MG extended release tablet Take 1 tablet by mouth 2 times daily 12/22/22   CYRUS Madrigal - CNP   amitriptyline (ELAVIL) 10 MG tablet Take 30 mg by mouth nightly 12/14/22   Historical Provider, MD   DULoxetine (CYMBALTA) 60 MG extended release capsule  12/14/22   Historical Provider, MD   nitroGLYCERIN (NITROSTAT) 0.4 MG SL tablet Place 1 tablet under the tongue every 5 minutes as needed for Chest pain up to max of 3 total doses.  If no relief after 1 dose, call 911. 12/8/22   Fe Lepe, APRN - CNP   diclofenac sodium (VOLTAREN) 1 % GEL Apply 4 g topically 4 times daily 12/7/22   Audelia Falcon MD   acetaminophen (TYLENOL) 500 MG tablet Take 1 tablet by mouth 4 times daily as needed for Pain 12/2/22   Audelia Falcon MD   pregabalin (LYRICA) 50 MG capsule Take 1 capsule by mouth 3 times daily for 30 days. 12/2/22 1/1/23  Audelia Falcon MD   buPROPion [facility]) 150 MG extended release tablet Take 150 mg by mouth daily 11/14/22   Historical Provider, MD   fexofenadine (ALLEGRA) 180 MG tablet Take 180 mg by mouth daily 5/4/22   Historical Provider, MD   tiZANidine (ZANAFLEX) 4 MG tablet Take 4 mg by mouth daily 11/14/22   Historical Provider, MD   QUEtiapine (SEROQUEL) 25 MG tablet Take 1-2 tablets by mouth nightly 10/18/22   Moriah Castro MD   Erenumab-aooe (AIMOVIG) 140 MG/ML SOAJ Inject 140 mg into the skin every 30 days 10/18/22   Moriah Castro MD   omeprazole (PRILOSEC) 20 MG delayed release capsule TAKE 1 CAPSULE BY MOUTH DAILY 10/18/22   Audelia Falcon MD   montelukast (SINGULAIR) 10 MG tablet TAKE 1 TABLET BY MOUTH DAILY 10/18/22   Audelia Falcon MD   fluticasone-salmeterol (ADVAIR) 500-50 MCG/ACT AEPB diskus inhaler Inhale 1 puff into the lungs in the morning and 1 puff in the evening. 9/28/22   Audelia Falcon MD   clotrimazole-betamethasone (LOTRISONE) 1-0.05 % cream Apply topically 2 times daily.  5/25/22   Marin Wan MD   ASPIRIN LOW DOSE 81 MG EC tablet TAKE 1 TABLET BY MOUTH DAILY 4/19/22   Audelia Falcon MD   insulin glargine [facility]) 100 UNIT/ML injection pen Inject 8 Units into the skin 2 times daily 1/27/22   Audelia Falcon MD   docusate sodium (COLACE) 100 MG capsule Take 100 mg by mouth 2 times daily 11/29/21   Historical Provider, MD   atorvastatin (LIPITOR) 80 MG tablet TAKE 1 TABLET BY MOUTH NIGHTLY 11/2/21   Audelia Falcon MD   linaclotide (LINZESS) 145 MCG capsule Take 1 capsule by mouth every morning (before breakfast) 10/15/21   Lea Hill MD       Allergy(ies):  Patient has no known allergies. Social History:  TOBACCO:  reports that she quit smoking about 4 years ago. Her smoking use included cigarettes. She has a 17.50 pack-year smoking history. She has never used smokeless tobacco.  ETOH:  reports no history of alcohol use. Family History:      Problem Relation Age of Onset    Diabetes Mother     High Cholesterol Mother     Stroke Mother     Cancer Mother     High Blood Pressure Mother     No Known Problems Paternal Grandfather         lung issues        Review of Systems:  Pertinent positives are listed in HPI. At least 10-point ROS reviewed and were negative. Vitals and physical examination:  BP (!) 192/85   Pulse 77   Temp 99.1 °F (37.3 °C) (Oral)   Resp 16   Ht 5' (1.524 m)   Wt 128 lb 15.5 oz (58.5 kg)   SpO2 99%   BMI 25.19 kg/m²   Gen/overall appearance: Not in acute distress. Alert. Oriented x3  Head: Normocephalic, atraumatic  Eyes: EOMI, good acuity  ENT: Oral mucosa moist  Neck: No JVD, thyromegaly  CVS: Nml S1S2, no MRG, RRR  Pulm: Clear bilaterally. No crackles/wheezes  Gastrointestinal: Soft, NT/ND, +BS  Musculoskeletal: No edema. Warm  Neuro: No focal deficit. Moves extremity spontaneously. Psychiatry: Appropriate affect. Not agitated. Skin: Warm, dry with normal turgor.  No rash  Capillary refill: Brisk,< 3 seconds   Peripheral Pulses: +2 palpable, equal bilaterally      Labs/imaging/EKG:  CBC:   Recent Labs     02/15/23  1032 02/17/23  1047   WBC 6.2 6.7   HGB 10.7* 10.4*     --      BMP:    Recent Labs     02/15/23  1032 02/17/23  1047   * 133*   K 4.6 5.3*   CL 98* 101   CO2 20* 18*   BUN 27* 21*   CREATININE 1.8* 1.3*   GLUCOSE 241* 209*     Hepatic:   Recent Labs     02/17/23  1047   AST 27   ALT 9*   BILITOT <0.2   ALKPHOS 160*       XR CHEST PORTABLE    Result Date: 2/17/2023  EXAMINATION: ONE XRAY VIEW OF THE CHEST 2/17/2023 10:31 am COMPARISON: 11/14/2022 HISTORY: ORDERING SYSTEM PROVIDED HISTORY: chest pain TECHNOLOGIST PROVIDED HISTORY: Reason for exam:->chest pain FINDINGS: Heart size is normal.  Mediastinal contours are normal.  Pulmonary vascularity is normal.  No focal lung consolidation noted.  There is mild eventration of the right hemidiaphragm Subtle thickening minor fissure on the right is seen. Spurring is seen in the spine.  Spurring is seen in the shoulder joints. There is likely underlying mild emphysema..  There is evidence of calcific rotator cuff tendinopathy on the left     Trace fissural thickening on the right.  This can occasionally be a sign of mild fluid overload. Otherwise unremarkable study       EKG: Normal sinus rhythm, rate 80 beats per minutes.  No acute ST/T changes.  Occasional PVCs present.  QTc 456.  I reviewed EKG.    Discussed with ER MD.      Thank you TIP BETTS MD for the opportunity to be involved in this patient's care.    -----------------------------  Basia Olea MD  [facility] hospitalist

## 2023-02-17 NOTE — Clinical Note
Discharge Plan[de-identified] Other/[facility])   Telemetry/Cardiac Monitoring Required?: Yes

## 2023-02-17 NOTE — ACP (ADVANCE CARE PLANNING)
Advance Care Planning     Advance Care Planning Activator (Inpatient)  Conversation Note      Date of ACP Conversation: 2/17/2023     Conversation Conducted with: Patient with Decision Making Capacity    ACP Activator: Jarad Cochran Maker:     Current Designated Health Care Decision Maker:     Primary Decision Maker: [address] - [phone]    Secondary Decision Maker: Manning Harada Child - [phone]    Secondary Decision Maker: Annabelle Raza Child - [phone]  Today we documented Decision Maker(s) consistent with Legal Next of Kin hierarchy. Care Preferences    Ventilation: \"If you were in your present state of health and suddenly became very ill and were unable to breathe on your own, what would your preference be about the use of a ventilator (breathing machine) if it were available to you? \"      Would the patient desire the use of ventilator (breathing machine)?: yes    \"If your health worsens and it becomes clear that your chance of recovery is unlikely, what would your preference be about the use of a ventilator (breathing machine) if it were available to you? \"     Would the patient desire the use of ventilator (breathing machine)?: Yes      Resuscitation  \"CPR works best to restart the heart when there is a sudden event, like a heart attack, in someone who is otherwise healthy. Unfortunately, CPR does not typically restart the heart for people who have serious health conditions or who are very sick. \"    \"In the event your heart stopped as a result of an underlying serious health condition, would you want attempts to be made to restart your heart (answer \"yes\" for attempt to resuscitate) or would you prefer a natural death (answer \"no\" for do not attempt to resuscitate)? \" yes       [x] Yes   [] No   Educated Patient / Natalie Jarquin regarding differences between Advance Directives and portable DNR orders.     Length of ACP Conversation in minutes: Conversation Outcomes:  [x] ACP discussion completed  [] Existing advance directive reviewed with patient; no changes to patient's previously recorded wishes  [] New Advance Directive completed  [] Portable Do Not Rescitate prepared for Provider review and signature  [] POLST/POST/MOLST/MOST prepared for Provider review and signature      Follow-up plan:    [] Schedule follow-up conversation to continue planning  [] Referred individual to Provider for additional questions/concerns   [] Advised patient/agent/surrogate to review completed ACP document and update if needed with changes in condition, patient preferences or care setting    [x] This note routed to one or more involved healthcare providers

## 2023-02-17 NOTE — CONSULTS
[id number] [facility]  1955    February 17, 2023    Reason for Consult: Chest Pain    CC: Chest Pain    HPI:  The patient is 78 y.o. female with a past medical history significant for CAD, type 2 DM, stage 3 CKD, COPD, prior TIA's, paroxysmal atrial fibrillation s/p Watchman device who presented to the [facility] ED with chest pain. I was asked to see her for this. She follows with Dr. Loretta Moreira in the outpatient office. Micheal Cooney states that she woke up with chest pain this morning. She sat on the bedside and felt lightheaded. She took a total of 3 nitroglycerin tablets without complete relief. She noticed that when she got up she was short of breath. The chest pain was like a pressure radiating to her left flank. The pain is still present and she rates it a 7 out of 10 in severity. She has some nausea. Only pain medication has helped the pain. Review of Systems:  Constitutional: No fatigue, weakness, night sweats or fever. HEENT: No new vision difficulties or ringing in the ears. Respiratory: No new SOB, PND, orthopnea or cough. Cardiovascular: See HPI   GI: No n/v, diarrhea, constipation, abdominal pain or changes in bowel habits. No melena, no hematochezia  : No urinary frequency, urgency, incontinence, hematuria or dysuria. Skin: No cyanosis or skin lesions. Musculoskeletal: No new muscle or joint pain. Neurological: No syncope or TIA-like symptoms.   Psychiatric: No anxiety, insomnia or depression     Past Medical History:   Diagnosis Date    Acid reflux     Anemia     Anxiety and depression     Arthritis     Asthma     Atrial fibrillation (HCC)     CAD (coronary artery disease) 12/3/2012    Cerebral artery occlusion with cerebral infarction [facility])     TIA\"\"S--right sided weakness & headache    CHF (congestive heart failure) (HCC)     Chronic kidney disease--stage III     40% kidney function    COPD (chronic obstructive pulmonary disease) ([facility] [address]     DM2 (diabetes mellitus, type 2) ([facility] [address]     Dysarthria     Fibromyalgia 2016    Headache(784.0) 2013    Hemisensory loss     History of blood transfusion 2020    pt denies having transfusion reaction    Hyperlipidemia     Hypertension     IBS (irritable bowel syndrome)     Inferior vena cava occlusion (HCC)     Keratitis     Meningioma (HCC)     MI, old     Neuropathy     Superior vena cava obstruction     Temporal arteritis ([facility] [address] 2014    Wears glasses      Past Surgical History:   Procedure Laterality Date    ABLATION OF DYSRHYTHMIC FOCUS    and 2020    times 2    ARTERY BIOPSY Right 2014    RIGHT TEMPORAL ARTERY BIOPSY    CAROTID ARTERY SURGERY Left     clean up per pt    CATARACT REMOVAL Bilateral     CHOLECYSTECTOMY      COLONOSCOPY N/A 2021    COLONOSCOPY WITH BIOPSY performed by Jessica Vasquez MD at [address] [place] [address] N/A 10/15/2021    COLONOSCOPY performed by Jessica Vasquez MD at [place] [address] (CERVIX STATUS UNKNOWN)      JOINT REPLACEMENT Right     KNEE ARTHROSCOPY Right     PTCA  10/2019    LAD and RCA inrtervention    TUNNELED VENOUS PORT PLACEMENT      left thigh.   SMART PORT-----Removed--total of 4 port placement and removal    UPPER GASTROINTESTINAL ENDOSCOPY N/A 2020    EGD DIAGNOSTIC ONLY performed by Aman Sparrow MD at [address] [place] [address] Road History   Problem Relation Age of Onset    Diabetes Mother     High Cholesterol Mother     Stroke Mother     Cancer Mother     High Blood Pressure Mother     No Known Problems Paternal Grandfather         lung issues      Social History     Tobacco Use    Smoking status: Former     Packs/day: 0.50     Years: 35.00     Pack years: 17.50     Types: Cigarettes     Quit date: 2018     Years since quittin.6    Smokeless tobacco: Never    Tobacco comments:     5/13/15 has not smoked since hospitalization - kh   Vaping Use    Vaping Use: Never used   Substance Use Topics    Alcohol use: No     Alcohol/week: 0.0 standard drinks    Drug use: Never       No Known Allergies  Current Facility-Administered Medications   Medication Dose Route Frequency Provider Last Rate Last Admin    amitriptyline (ELAVIL) tablet 30 mg  30 mg Oral Nightly Alexia Garcia MD        [START ON 2/18/2023] amLODIPine (NORVASC) tablet 10 mg  10 mg Oral Daily Basia Castorena MD        atorvastatin (LIPITOR) tablet 80 mg  80 mg Oral Nightly Alexia Garcia MD        [START ON 2/18/2023] buPROPion [facility]) extended release tablet 150 mg  150 mg Oral Daily Alexia Garcia MD        docusate sodium (COLACE) capsule 100 mg  100 mg Oral BID Alexia Garcia MD        [START ON 2/18/2023] DULoxetine (CYMBALTA) extended release capsule 60 mg  60 mg Oral Daily Alexia Garcia MD        cetirizine (ZYRTEC) tablet 10 mg  10 mg Oral Daily Alexia Garcia MD        mometasone-formoterol (DULERA) 200-5 MCG/ACT inhaler 2 puff  2 puff Inhalation BID Alexia Garcia MD   2 puff at 02/17/23 1218    [START ON 2/18/2023] furosemide (LASIX) tablet 20 mg  20 mg Oral Daily Alexia Garcia MD        [Held by provider] isosorbide mononitrate (IMDUR) extended release tablet 60 mg  60 mg Oral Daily Alexia Garcia MD        montelukast (SINGULAIR) tablet 10 mg  10 mg Oral Daily Alexia Garcia MD        [START ON 2/18/2023] pantoprazole (PROTONIX) tablet 40 mg  40 mg Oral QAM AC Alexia Garcia MD        ranolazine (RANEXA) extended release tablet 1,000 mg  1,000 mg Oral BID Alexia Garcia MD        glucose chewable tablet 16 g  4 tablet Oral PRN Alexia Garcia MD        dextrose bolus 10% 125 mL  125 mL IntraVENous PRN Alexia Garcia MD        Or    dextrose bolus 10% 250 mL  250 mL IntraVENous PRN Alexia Garcia MD        glucagon (rDNA) injection 1 mg  1 mg SubCUTAneous PRN Basia Olea MD        dextrose 10 % infusion   IntraVENous Continuous PRN Basia GUEVARA Alonso Rg MD        insulin lispro (HUMALOG) injection vial 0-4 Units  0-4 Units SubCUTAneous TID WC Asha Wallace MD        insulin lispro (HUMALOG) injection vial 0-4 Units  0-4 Units SubCUTAneous Nightly Asha Wallace MD        furosemide (LASIX) injection 40 mg  40 mg IntraVENous Once Asha Wallace MD        sodium chloride flush 0.9 % injection 10 mL  10 mL IntraVENous 2 times per day Asha Wallace MD        sodium chloride flush 0.9 % injection 10 mL  10 mL IntraVENous PRN Asha Wallace MD        0.9 % sodium chloride infusion   IntraVENous PRN Asha Wallace MD        acetaminophen (TYLENOL) tablet 650 mg  650 mg Oral Q6H PRN Asha Wallace MD        Or    acetaminophen (TYLENOL) suppository 650 mg  650 mg Rectal Q6H PRN Asha Wallace MD        polyethylene glycol (GLYCOLAX) packet 17 g  17 g Oral Daily PRN Asha Wallace MD        [START ON 2/18/2023] aspirin chewable tablet 81 mg  81 mg Oral Daily Basia Olea MD        nitroGLYCERIN (NITROSTAT) SL tablet 0.4 mg  0.4 mg SubLINGual Q5 Min PRN Asha Wallace MD        enoxaparin (LOVENOX) injection 40 mg  40 mg SubCUTAneous Nightly Asha Wallace MD        aspirin chewable tablet 243 mg  243 mg Oral Once Asha Wallace MD        morphine (PF) injection 2 mg  2 mg IntraVENous Q4H PRN Asha Wallace MD   2 mg at 02/17/23 1345    insulin glargine (LANTUS) injection vial 8 Units  8 Units SubCUTAneous BID Basia Olea MD        labetalol (NORMODYNE;TRANDATE) injection 10 mg  10 mg IntraVENous Q4H PRN Asha Wallace MD        tiZANidine (ZANAFLEX) tablet 4 mg  4 mg Oral BID Asha Wallace MD        QUEtiapine (SEROQUEL) tablet 50 mg  50 mg Oral Nightly Asha Wallace MD        [START ON 2/18/2023] linaclotide (LINZESS) capsule 290 mcg (Patient Supplied)  290 mcg Oral QAM AC Basia Olea MD        labetalol (NORMODYNE) tablet 300 mg  300 mg Oral 3 times per day Asha Wallace MD           Physical Exam:   BP (!) 186/73   Pulse 77   Temp 98.7 °F (37.1 °C) (Oral)   Resp 19   Ht 5' (1.524 m)   Wt 128 lb 15.5 oz (58.5 kg)   SpO2 100%   BMI 25.19 kg/m²   No intake or output data in the 24 hours ending 02/17/23 1427  Wt Readings from Last 2 Encounters:   02/17/23 128 lb 15.5 oz (58.5 kg)   01/09/23 133 lb (60.3 kg)     Constitutional: She is oriented to person, place, and time. She appears well-developed and well-nourished. In no acute distress. Head: Normocephalic and atraumatic. Neck: Neck supple. No JVD present. Carotid bruit is not present. No mass and no thyromegaly present. No lymphadenopathy present. Cardiovascular: Normal rate, regular rhythm, normal heart sounds and intact distal pulses. Exam reveals no gallop and no friction rub. No murmur heard. Pulmonary/Chest: Effort normal and breath sounds normal. No respiratory distress. She has no wheezes, rhonchi or rales. Abdominal: Soft, non-tender. Bowel sounds and aorta are normal. She exhibits no organomegaly, mass or bruit. Extremities: No edema, cyanosis, or clubbing. Pulses are 2+ radial/carotid/dorsalis pedis and posterior tibial bilaterally. Neurological: She is alert and oriented to person, place, and time. She has normal reflexes. No cranial nerve deficit. Coordination normal.   Skin: Skin is warm and dry. There is no rash or diaphoresis. Psychiatric: She has a normal mood and affect.  Her speech is normal and behavior is normal.     Personally reviewed and interpreted   EKG Interpretation: Sinus rhythm with prior septal infarct    Lab Review:   Lab Results   Component Value Date/Time    TRIG 121 08/28/2022 06:30 AM    HDL 53 08/28/2022 06:30 AM    HDL 58 05/14/2012 03:27 PM    LDLCALC 132 08/28/2022 06:30 AM    LABVLDL 24 08/28/2022 06:30 AM     Lab Results   Component Value Date/Time     02/17/2023 10:47 AM    K 5.3 02/17/2023 10:47 AM    BUN 21 02/17/2023 10:47 AM    CREATININE 1.3 02/17/2023 10:47 AM     Recent Labs 02/15/23  1032 02/17/23  1047   WBC 6.2 6.7   HGB 10.7* 10.4*   HCT 33.6* 33.9*    151     Stress Perfusion 8/29/22:  Non-diagnostic ECG d/t inadequate achieved HF (Pharmacologic study)  Normal myocardial perfusion study. Normal LV size and systolic function. Overall findings represent a low risk scan. Echo 8/29/22:  Normal left ventricle size, wall thickness and systolic function with an estimated ejection fraction of 55-60%. Grade II diastolic dysfunction with elevated LV filling pressures. Normal right ventricular size and function. The left atrium is moderately dilated. Left Heart Cath 12/20/2018:  ANGIOGRAPHY FINDINGS:  1. Left main comes from the left coronary cusp. YAKELIN 3 flow. No  stenosis. 2.  Left anterior descending artery is patent. Distal left anterior  descending artery has a 70% stenosis. 3.  Left circumflex has patent stents. No significant stenosis. 4.  Right coronary artery has patent stents. No significant stenosis. 5.  LV ejection fraction 60%. Assessment / Plan: 1. Chest Pain, atypical  Maren's chest pain is noncardiac in nature. She has had multiple admissions for the same chest pain. It has been constant for her despite nitro with essentially normal troponin assays (troponin 0.02). I would not pursue an ischemic work-up for this. 2.Paroxysmal Atrial fibrillation  She remains in sinus rhythm  S/p Watchman Device so she is not on anticoagulation. 3.Essential Hypertension  Continue home medications. I would consider switching her labetalol to Carvedilol 12.5mg bid or 25mg bid for better BP control if this remains elevated. We will sign off for now. PLease call with questions. Follow-up in the office with Dr. Prasanth Lee in 1-2 weeks.

## 2023-02-17 NOTE — ED PROVIDER NOTES
[address] EMERGENCY DEPARTMENT    CHIEF COMPLAINT  Chest Pain (Onset around 0700; ems gave 324 ASA and 1 NTG w/ initial relief but pain has returned; middle and radiating to the left; )       HISTORY OF PRESENT ILLNESS  Alberto Bee is a 78 y.o. female who presents to the ED with complaint of chest pain. Started at 0730. Woke her from sleep. [place] well last night. Heaviness, midsternal, radiates to the left, 10/10, transiently better with nitro but now worse. Denies fever, cough, congestion. Some new KELLER. Nausea but no emesis or diarrhea. Previous history of MI, this feels similar. Stress test 8/29/22 reviewed:    Summary    Non-diagnostic ECG d/t inadequate achieved HF (Pharmacologic study)    Normal myocardial perfusion study. Normal LV size and systolic function. Overall findings represent a low risk scan.        I have reviewed the following from the nursing documentation:    Past Medical History:   Diagnosis Date    Acid reflux     Anemia     Anxiety and depression     Arthritis     Asthma     Atrial fibrillation (HCC)     CAD (coronary artery disease) 12/3/2012    Cerebral artery occlusion with cerebral infarction [facility])     TIA\"\"S--right sided weakness & headache    CHF (congestive heart failure) ([facility])     Chronic kidney disease--stage III     40% kidney function    COPD (chronic obstructive pulmonary disease) ([facility])     DM2 (diabetes mellitus, type 2) ([facility]     Dysarthria     Fibromyalgia 6/7/2016    Headache(784.0) 2/19/2013    Hemisensory loss     History of blood transfusion 11/2020    pt denies having transfusion reaction    Hyperlipidemia     Hypertension     IBS (irritable bowel syndrome)     Inferior vena cava occlusion (HCC)     Keratitis     Meningioma ([facility])     MI, old     Neuropathy     Superior vena cava obstruction     Temporal arteritis ([facility] 2/24/2014    Wears glasses      Past Surgical History:   Procedure Laterality Date    ABLATION OF DYSRHYTHMIC FOCUS  1999  and 2020    times 2    ARTERY BIOPSY Right 2014    RIGHT TEMPORAL ARTERY BIOPSY    CAROTID ARTERY SURGERY Left     clean up per pt    CATARACT REMOVAL Bilateral     CHOLECYSTECTOMY      COLONOSCOPY N/A 2021    COLONOSCOPY WITH BIOPSY performed by Edy Duran MD at [address] [facility] N/A 10/15/2021    COLONOSCOPY performed by Edy Duran MD at [facility] (CERVIX STATUS UNKNOWN)      JOINT REPLACEMENT Right     KNEE ARTHROSCOPY Right     PTCA  10/2019    LAD and RCA inrtervention    TUNNELED VENOUS PORT PLACEMENT      left thigh. SMART PORT-----Removed--total of 4 port placement and removal    UPPER GASTROINTESTINAL ENDOSCOPY N/A 2020    EGD DIAGNOSTIC ONLY performed by Selma Monterroso MD at [address] History   Problem Relation Age of Onset    Diabetes Mother     High Cholesterol Mother     Stroke Mother     Cancer Mother     High Blood Pressure Mother     No Known Problems Paternal Grandfather         lung issues      Social History     Socioeconomic History    Marital status:       Spouse name: Not on file    Number of children: 8    Years of education: Not on file    Highest education level: Not on file   Occupational History    Not on file   Tobacco Use    Smoking status: Former     Packs/day: 0.50     Years: 35.00     Pack years: 17.50     Types: Cigarettes     Quit date: 2018     Years since quittin.6    Smokeless tobacco: Never    Tobacco comments:     5/13/15 has not smoked since hospitalization - kh   Vaping Use    Vaping Use: Never used   Substance and Sexual Activity    Alcohol use: No     Alcohol/week: 0.0 standard drinks    Drug use: Never    Sexual activity: Not Currently     Partners: Male   Other Topics Concern    Not on file   Social History Narrative    Not on file     Social Determinants of Health     Financial Resource Strain: Not on file   Food Insecurity: Not on file   Transportation Needs: Not on file   Physical Activity: Insufficiently Active    Days of Exercise per Week: 7 days    Minutes of Exercise per Session: 10 min   Stress: Not on file   Social Connections: Not on file   Intimate Partner Violence: Not on file   Housing Stability: Not on file     No current facility-administered medications for this encounter. Current Outpatient Medications   Medication Sig Dispense Refill    furosemide (LASIX) 20 MG tablet TAKE 1 TABLET BY MOUTH DAILY 30 tablet 1    ondansetron (ZOFRAN-ODT) 4 MG disintegrating tablet TAKE 1 TABLET BY MOUTH 3 TIMES DAILY AS NEEDED FOR NAUSEA OR VOMITING 30 tablet 1    labetalol (NORMODYNE) 200 MG tablet Take 1 tablet by mouth 2 times daily 60 tablet 3    isosorbide mononitrate (IMDUR) 60 MG extended release tablet Take 1 tablet by mouth daily 90 tablet 5    amLODIPine (NORVASC) 10 MG tablet Take 1 tablet by mouth daily 90 tablet 1    ranolazine (RANEXA) 1000 MG extended release tablet Take 1 tablet by mouth 2 times daily 60 tablet 8    amitriptyline (ELAVIL) 10 MG tablet       DULoxetine (CYMBALTA) 60 MG extended release capsule       nitroGLYCERIN (NITROSTAT) 0.4 MG SL tablet Place 1 tablet under the tongue every 5 minutes as needed for Chest pain up to max of 3 total doses. If no relief after 1 dose, call 911. 25 tablet 3    diclofenac sodium (VOLTAREN) 1 % GEL Apply 4 g topically 4 times daily 250 g 4    acetaminophen (TYLENOL) 500 MG tablet Take 1 tablet by mouth 4 times daily as needed for Pain 120 tablet 5    pregabalin (LYRICA) 50 MG capsule Take 1 capsule by mouth 3 times daily for 30 days.  90 capsule 1    buPROPion (WELLBUTRIN SR) 150 MG extended release tablet Take 150 mg by mouth daily      fexofenadine (ALLEGRA) 180 MG tablet Take 180 mg by mouth daily      tiZANidine (ZANAFLEX) 4 MG tablet Take 4 mg by mouth daily      QUEtiapine (SEROQUEL) 25 MG tablet Take 1-2 tablets by mouth nightly 60 tablet 1    Erenumab-aooe (AIMOVIG) 140 MG/ML SOAJ Inject 140 mg into the skin every 30 days 1 Adjustable Dose Pre-filled Pen Syringe 1    omeprazole (PRILOSEC) 20 MG delayed release capsule TAKE 1 CAPSULE BY MOUTH DAILY 30 capsule 1    montelukast (SINGULAIR) 10 MG tablet TAKE 1 TABLET BY MOUTH DAILY 30 tablet 1    albuterol sulfate HFA (PROVENTIL;VENTOLIN;PROAIR) 108 (90 Base) MCG/ACT inhaler Inhale 2 puffs into the lungs every 4 hours as needed for Wheezing or Shortness of Breath 54 g 5    fluticasone-salmeterol (ADVAIR) 500-50 MCG/ACT AEPB diskus inhaler Inhale 1 puff into the lungs in the morning and 1 puff in the evening. 60 each 3    clotrimazole-betamethasone (LOTRISONE) 1-0.05 % cream Apply topically 2 times daily. 5 g 5    ASPIRIN LOW DOSE 81 MG EC tablet TAKE 1 TABLET BY MOUTH DAILY 90 tablet 5    albuterol (PROVENTIL) (2.5 MG/3ML) 0.083% nebulizer solution TAKE 3 MLS BY NEBULIZATION EVERY 4 HOURS AS NEEDED FOR WHEEZING 360 mL 5    insulin glargine (BASAGLAR KWIKPEN) 100 UNIT/ML injection pen Inject 8 Units into the skin 2 times daily 5 pen 3    docusate sodium (COLACE) 100 MG capsule Take 100 mg by mouth 2 times daily      atorvastatin (LIPITOR) 80 MG tablet TAKE 1 TABLET BY MOUTH NIGHTLY 90 tablet 5    linaclotide (LINZESS) 145 MCG capsule Take 1 capsule by mouth every morning (before breakfast) 30 capsule 5     No Known Allergies      PHYSICAL EXAM  ED Triage Vitals [02/17/23 1003]   BP Temp Temp Source Heart Rate Resp SpO2 Height Weight   (!) 202/81 99.1 °F (37.3 °C) Oral 76 18 99 % 5' (1.524 m) 128 lb 15.5 oz (58.5 kg)     General appearance: Awake and alert. Cooperative. No acute distress. HEENT: Normocephalic. Atraumatic. Mucous membranes are moist.  Heart/Chest: RRR. No murmurs. Lungs: Respirations unlabored. CTAB. Good air exchange. Speaking comfortably in full sentences. Abdomen: Soft. Non-tender. Non-distended. No rebound or guarding. No pulsatile mass. Musculoskeletal: No extremity edema. No deformity. Skin: Warm and dry. No acute rashes. Neurological: Alert and oriented. CN II-XII intact. LABS  I have reviewed all labs for this visit.    Results for orders placed or performed during the hospital encounter of 02/17/23   CBC with Auto Differential   Result Value Ref Range    WBC 6.7 4.0 - 11.0 K/uL    RBC 3.47 (L) 4.00 - 5.20 M/uL    Hemoglobin 10.4 (L) 12.0 - 16.0 g/dL    Hematocrit 33.9 (L) 36.0 - 48.0 %    MCV 97.7 80.0 - 100.0 fL    MCH 30.0 26.0 - 34.0 pg    MCHC 30.8 (L) 31.0 - 36.0 g/dL    RDW 16.2 (H) 12.4 - 15.4 %    Platelets 407 127 - 702 K/uL    MPV 8.4 5.0 - 10.5 fL    PLATELET SLIDE REVIEW Adequate     SLIDE REVIEW see below     Neutrophils % 64.8 %    Lymphocytes % 24.3 %    Monocytes % 4.8 %    Eosinophils % 5.7 %    Basophils % 0.4 %    Neutrophils Absolute 4.4 1.7 - 7.7 K/uL    Lymphocytes Absolute 1.6 1.0 - 5.1 K/uL    Monocytes Absolute 0.3 0.0 - 1.3 K/uL    Eosinophils Absolute 0.4 0.0 - 0.6 K/uL    Basophils Absolute 0.0 0.0 - 0.2 K/uL   Comprehensive Metabolic Panel w/ Reflex to MG   Result Value Ref Range    Sodium 133 (L) 136 - 145 mmol/L    Potassium reflex Magnesium 5.3 (H) 3.5 - 5.1 mmol/L    Chloride 101 99 - 110 mmol/L    CO2 18 (L) 21 - 32 mmol/L    Anion Gap 14 3 - 16    Glucose 209 (H) 70 - 99 mg/dL    BUN 21 (H) 7 - 20 mg/dL    Creatinine 1.3 (H) 0.6 - 1.2 mg/dL    Est, Glom Filt Rate 45 (A) >60    Calcium 9.1 8.3 - 10.6 mg/dL    Total Protein 7.6 6.4 - 8.2 g/dL    Albumin 3.2 (L) 3.4 - 5.0 g/dL    Albumin/Globulin Ratio 0.7 (L) 1.1 - 2.2    Total Bilirubin <0.2 0.0 - 1.0 mg/dL    Alkaline Phosphatase 160 (H) 40 - 129 U/L    ALT 9 (L) 10 - 40 U/L    AST 27 15 - 37 U/L   Troponin   Result Value Ref Range    Troponin <0.01 <0.01 ng/mL   Brain Natriuretic Peptide   Result Value Ref Range    Pro-BNP 2,503 (H) 0 - 124 pg/mL   Troponin   Result Value Ref Range    Troponin 0.02 (H) <0.01 ng/mL   EKG 12 Lead   Result Value Ref Range    Ventricular Rate 80 BPM    Atrial Rate 80 BPM    P-R Interval 116 ms    QRS Duration 88 ms    Q-T Interval 396 ms    QTc Calculation (Bazett) 456 ms    P Axis -10 degrees    R Axis 28 degrees    T Axis 96 degrees    Diagnosis       Sinus rhythm with occasional Premature ventricular complexesSeptal infarct , age undeterminedAbnormal ECGWhen compared with ECG of 15-NOV-2022 08:37,Premature ventricular complexes are now PresentSeptal infarct is now PresentNonspecific T wave abnormality now evident in Anterior leads           RADIOLOGY  I have reviewed all radiographic studies for this visit.   XR CHEST PORTABLE   Final Result   Trace fissural thickening on the right.  This can occasionally be a sign of   mild fluid overload.      Otherwise unremarkable study              EKG  EKG interpreted by myself.   Rate: normal  Rhythm: sinus with occasional PVCs  Axis: normal  Intervals: within normal limits  ST Segments: Nonspecific ST/T abnormality   Comparison: Compared to 12/8/22, there is occ PVC, there is improvement of ST/T abnormality in lateral leads  Impression: sinus w/ occ PVC , age indeterminate septal infarct, Nonspecific ST/T abnormality     ED COURSE/MDM    67 y.o. female presents with chest pain.  EKG shows sinus rhythm with occasional PVCs and a nonspecific ST/T wave abnormality.  Troponin negative.  Potassium 5.3 however specimen hemolyzed.  No peaking of T waves on EKG.  Favored aberrancy.  BNP elevated at 2500.  Patient was administered nitroglycerin with transient improvement in her symptoms.  Therefore placed on nitroglycerin drip.  Patient received full dose aspirin prior to arrival.  Admit to hospital medicine for further evaluation and treatment. Case d/w Dr. Olea via [employer].     Is this patient to be included in the SEP-1 Core Measure?  No   Exclusion criteria - the patient is NOT to be included for SEP-1 Core Measure due to:  Infection is not suspected    IDevin MD, am the primary clinician of record.     During the patient's ED course, the  patient was given:  Medications   amitriptyline (ELAVIL) tablet 30 mg (has no administration in time range)   amLODIPine (NORVASC) tablet 10 mg (has no administration in time range)   atorvastatin (LIPITOR) tablet 80 mg (has no administration in time range)   buPROPion [facility] SR) extended release tablet 150 mg (has no administration in time range)   docusate sodium (COLACE) capsule 100 mg (has no administration in time range)   DULoxetine (CYMBALTA) extended release capsule 60 mg (has no administration in time range)   cetirizine (ZYRTEC) tablet 10 mg (10 mg Oral Given 2/17/23 1457)   mometasone-formoterol (DULERA) 200-5 MCG/ACT inhaler 2 puff (2 puffs Inhalation Given 2/17/23 1218)   furosemide (LASIX) tablet 20 mg (has no administration in time range)   isosorbide mononitrate (IMDUR) extended release tablet 60 mg ( Oral Automatically Held 2/20/23 0900)   montelukast (SINGULAIR) tablet 10 mg (10 mg Oral Given 2/17/23 1456)   pantoprazole (PROTONIX) tablet 40 mg (has no administration in time range)   ranolazine (RANEXA) extended release tablet 1,000 mg (has no administration in time range)   glucose chewable tablet 16 g (has no administration in time range)   dextrose bolus 10% 125 mL (has no administration in time range)     Or   dextrose bolus 10% 250 mL (has no administration in time range)   glucagon (rDNA) injection 1 mg (has no administration in time range)   dextrose 10 % infusion (has no administration in time range)   insulin lispro (HUMALOG) injection vial 0-4 Units (has no administration in time range)   insulin lispro (HUMALOG) injection vial 0-4 Units (has no administration in time range)   sodium chloride flush 0.9 % injection 10 mL (has no administration in time range)   sodium chloride flush 0.9 % injection 10 mL (has no administration in time range)   0.9 % sodium chloride infusion (has no administration in time range)   acetaminophen (TYLENOL) tablet 650 mg (has no administration in time range)     Or   acetaminophen (TYLENOL) suppository 650 mg (has no administration in time range)   polyethylene glycol (GLYCOLAX) packet 17 g (has no administration in time range)   aspirin chewable tablet 81 mg (has no administration in time range)   nitroGLYCERIN (NITROSTAT) SL tablet 0.4 mg (has no administration in time range)   enoxaparin (LOVENOX) injection 40 mg (has no administration in time range)   morphine (PF) injection 2 mg (2 mg IntraVENous Given 2/17/23 1345)   insulin glargine (LANTUS) injection vial 8 Units (has no administration in time range)   labetalol (NORMODYNE;TRANDATE) injection 10 mg (has no administration in time range)   tiZANidine (ZANAFLEX) tablet 4 mg (has no administration in time range)   QUEtiapine (SEROQUEL) tablet 50 mg (has no administration in time range)   linaclotide (LINZESS) capsule 290 mcg (Patient Supplied) (has no administration in time range)   labetalol (NORMODYNE) tablet 300 mg (300 mg Oral Given 2/17/23 1457)   ondansetron (ZOFRAN) injection 4 mg (4 mg IntraVENous Given 2/17/23 1155)   furosemide (LASIX) injection 40 mg (40 mg IntraVENous Given 2/17/23 1457)       All questions were answered and the patient/family expressed understanding and agreement with the plan. PROCEDURES  None    CRITICAL CARE  Total critical care time provided today thus far was 32 minutes. This excludes seperately billable procedures. Critical care time was provided for chest pain requiring nitroglycerine gtt and that required close evaluation and/or intervention with concern for potential patient decompensation. CLINICAL IMPRESSION  1. Chest pain, unspecified type        DISPOSITION  admit    Logan Fermin MD    Note: This chart was created using voice recognition dictation software. Efforts were made by me to ensure accuracy, however some errors may be present due to limitations of this technology and occasionally words are not transcribed correctly.            Logan Fermin MD  02/17/23 1526

## 2023-02-17 NOTE — ED NOTES
ED SBAR report provider to [place], [facility]. Patient to be transported to Room 5130 via stretcher by ED tech. Patient transported with bedside cardiac monitor. IV site clean, dry, and intact. MEWS score and pain assessed as 9/10 (is better does have PRN orders verified from pharmacy yet)  and documented. Updated patient on plan of care.        Vanessa Willoughby RN  02/17/23 7084

## 2023-02-17 NOTE — ED NOTES
EDMD at bedside talking with patient and placing EJ IV on left side d/t unsuccessful IV access by two prior EDRN;     Flor Boykin RN  02/17/23 7725

## 2023-02-17 NOTE — ED TRIAGE NOTES
Patient arrived to the ED via EMS from home w/ complaint of chest pain mid sternal that goes off to the left that onset this morning around 0700 woke her up from her sleep;    EMS reports that they gave 324 mg of ASA and 1 sublingual nitro w/ mild relief;      Patient reports that the pain makes her feel nauseated, dizziness, chest pain mid to left side;      She has HX of hypertension and does have high blood pressure; she states she took 3 nitroglycerin 5 minutes prior to calling the squad;     Patient is alert and orient x4; has stable vitals w/ exception of the high blood pressure;

## 2023-02-17 NOTE — CARE COORDINATION
INITIAL CASE MANAGEMENT ASSESSMENT     Reviewed chart, met with patient to assess possible discharge needs. Explained Case Management role/services. Living Situation: Patient lives with a friend in a Senior apartment with elevator access. ADLs: Needs assistance prn for personal care and dressing. Has HMK through the [facility]     DME: [employer] Screen, shower chair, grab bars around toilet, built-in shower seat, cane     PT/OT Recs: None in chart     Active Services: Active with [facility], [employer], [facility]HA 5h/day, 5 d/wk through [facility], MOW's, [employer], Transportation. [facility] - Prisca Ospina [phone]     Transportation: [employer] or family     Medications: [facility]  delivers. PCP:    Chiqui De Souza MD      HD/PD: N/A     PLAN/COMMENTS: Patient hopes to return to home with same services. She has used [facility] in the past if Home care ordered. ACP completed. No identified D/C needs at this time. SW/CM provided contact information for patient or family to call with any questions. SW/CM will follow and assist as needed. The Plan for Transition of Care is related to the following treatment goals: return home    The Patient was provided with a choice of provider and agrees   with the discharge plan. [x] Yes [] No    Freedom of choice list was provided with basic dialogue that supports the patient's individualized plan of care/goals, treatment preferences and shares the quality data associated with the providers.  [x] Yes [] No

## 2023-02-17 NOTE — PROGRESS NOTES
Pharmacy Medication Reconciliation Note     List of medications patient is currently taking is complete. Source of information:   1. Conversation with patient   2. EMR    Notes regarding home medications:   1. Patient received all of her morning home medication doses today before presenting to the ER. 2. Patient received prescriptions for iron and calcitriol yesterday - has not yet started taking.       [address] [facility] [place]  2/17/2023 12:28 PM

## 2023-02-18 LAB
A/G RATIO: 0.7 (ref 1.1–2.2)
ALBUMIN SERPL-MCNC: 3 G/DL (ref 3.4–5)
ALP BLD-CCNC: 161 U/L (ref 40–129)
ALT SERPL-CCNC: 10 U/L (ref 10–40)
ANION GAP SERPL CALCULATED.3IONS-SCNC: 15 MMOL/L (ref 3–16)
AST SERPL-CCNC: 22 U/L (ref 15–37)
BASOPHILS ABSOLUTE: 0 K/UL (ref 0–0.2)
BASOPHILS RELATIVE PERCENT: 0.7 %
BILIRUB SERPL-MCNC: 0.3 MG/DL (ref 0–1)
BUN BLDV-MCNC: 26 MG/DL (ref 7–20)
CALCIUM SERPL-MCNC: 9.1 MG/DL (ref 8.3–10.6)
CHLORIDE BLD-SCNC: 100 MMOL/L (ref 99–110)
CHOLESTEROL, TOTAL: 258 MG/DL (ref 0–199)
CO2: 20 MMOL/L (ref 21–32)
CREAT SERPL-MCNC: 2.3 MG/DL (ref 0.6–1.2)
EOSINOPHILS ABSOLUTE: 0.3 K/UL (ref 0–0.6)
EOSINOPHILS RELATIVE PERCENT: 6.3 %
ESTIMATED AVERAGE GLUCOSE: 220.2 MG/DL
GFR SERPL CREATININE-BSD FRML MDRD: 23 ML/MIN/{1.73_M2}
GLUCOSE BLD-MCNC: 186 MG/DL (ref 70–99)
GLUCOSE BLD-MCNC: 190 MG/DL (ref 70–99)
GLUCOSE BLD-MCNC: 257 MG/DL (ref 70–99)
GLUCOSE BLD-MCNC: 262 MG/DL (ref 70–99)
GLUCOSE BLD-MCNC: 341 MG/DL (ref 70–99)
HBA1C MFR BLD: 9.3 %
HCT VFR BLD CALC: 32.5 % (ref 36–48)
HDLC SERPL-MCNC: 44 MG/DL (ref 40–60)
HEMOGLOBIN: 10.1 G/DL (ref 12–16)
LDL CHOLESTEROL CALCULATED: 173 MG/DL
LYMPHOCYTES ABSOLUTE: 1.7 K/UL (ref 1–5.1)
LYMPHOCYTES RELATIVE PERCENT: 36.2 %
MAGNESIUM: 1.7 MG/DL (ref 1.8–2.4)
MCH RBC QN AUTO: 30.6 PG (ref 26–34)
MCHC RBC AUTO-ENTMCNC: 31.2 G/DL (ref 31–36)
MCV RBC AUTO: 98.3 FL (ref 80–100)
MONOCYTES ABSOLUTE: 0.3 K/UL (ref 0–1.3)
MONOCYTES RELATIVE PERCENT: 6 %
NEUTROPHILS ABSOLUTE: 2.3 K/UL (ref 1.7–7.7)
NEUTROPHILS RELATIVE PERCENT: 50.8 %
PDW BLD-RTO: 16.2 % (ref 12.4–15.4)
PERFORMED ON: ABNORMAL
PHOSPHORUS: 4.5 MG/DL (ref 2.5–4.9)
PLATELET # BLD: 189 K/UL (ref 135–450)
PMV BLD AUTO: 8.1 FL (ref 5–10.5)
POTASSIUM SERPL-SCNC: 4.3 MMOL/L (ref 3.5–5.1)
RBC # BLD: 3.31 M/UL (ref 4–5.2)
SODIUM BLD-SCNC: 135 MMOL/L (ref 136–145)
TOTAL PROTEIN: 7.1 G/DL (ref 6.4–8.2)
TRIGL SERPL-MCNC: 206 MG/DL (ref 0–150)
VLDLC SERPL CALC-MCNC: 41 MG/DL
WBC # BLD: 4.6 K/UL (ref 4–11)

## 2023-02-18 PROCEDURE — 84100 ASSAY OF PHOSPHORUS: CPT

## 2023-02-18 PROCEDURE — 94640 AIRWAY INHALATION TREATMENT: CPT

## 2023-02-18 PROCEDURE — 85025 COMPLETE CBC W/AUTO DIFF WBC: CPT

## 2023-02-18 PROCEDURE — G0378 HOSPITAL OBSERVATION PER HR: HCPCS

## 2023-02-18 PROCEDURE — 6370000000 HC RX 637 (ALT 250 FOR IP): Performed by: INTERNAL MEDICINE

## 2023-02-18 PROCEDURE — 80053 COMPREHEN METABOLIC PANEL: CPT

## 2023-02-18 PROCEDURE — 94760 N-INVAS EAR/PLS OXIMETRY 1: CPT

## 2023-02-18 PROCEDURE — 83735 ASSAY OF MAGNESIUM: CPT

## 2023-02-18 PROCEDURE — 36415 COLL VENOUS BLD VENIPUNCTURE: CPT

## 2023-02-18 PROCEDURE — 6360000002 HC RX W HCPCS: Performed by: INTERNAL MEDICINE

## 2023-02-18 PROCEDURE — 80061 LIPID PANEL: CPT

## 2023-02-18 PROCEDURE — 2580000003 HC RX 258: Performed by: INTERNAL MEDICINE

## 2023-02-18 RX ORDER — CARVEDILOL 6.25 MG/1
6.25 TABLET ORAL 2 TIMES DAILY WITH MEALS
Status: DISCONTINUED | OUTPATIENT
Start: 2023-02-18 | End: 2023-02-22 | Stop reason: HOSPADM

## 2023-02-18 RX ORDER — ENOXAPARIN SODIUM 100 MG/ML
30 INJECTION SUBCUTANEOUS NIGHTLY
Status: DISCONTINUED | OUTPATIENT
Start: 2023-02-18 | End: 2023-02-19

## 2023-02-18 RX ORDER — MAGNESIUM SULFATE 1 G/100ML
1000 INJECTION INTRAVENOUS ONCE
Status: COMPLETED | OUTPATIENT
Start: 2023-02-18 | End: 2023-02-18

## 2023-02-18 RX ORDER — SODIUM CHLORIDE 9 MG/ML
INJECTION, SOLUTION INTRAVENOUS CONTINUOUS
Status: ACTIVE | OUTPATIENT
Start: 2023-02-18 | End: 2023-02-19

## 2023-02-18 RX ORDER — HYDROXYZINE HYDROCHLORIDE 10 MG/1
10 TABLET, FILM COATED ORAL 3 TIMES DAILY PRN
Status: DISCONTINUED | OUTPATIENT
Start: 2023-02-18 | End: 2023-02-22 | Stop reason: HOSPADM

## 2023-02-18 RX ORDER — PROMETHAZINE HYDROCHLORIDE 25 MG/1
12.5 TABLET ORAL EVERY 6 HOURS PRN
Status: DISCONTINUED | OUTPATIENT
Start: 2023-02-18 | End: 2023-02-22 | Stop reason: HOSPADM

## 2023-02-18 RX ORDER — ONDANSETRON 2 MG/ML
4 INJECTION INTRAMUSCULAR; INTRAVENOUS EVERY 6 HOURS PRN
Status: DISCONTINUED | OUTPATIENT
Start: 2023-02-18 | End: 2023-02-22 | Stop reason: HOSPADM

## 2023-02-18 RX ORDER — LABETALOL HYDROCHLORIDE 5 MG/ML
5 INJECTION, SOLUTION INTRAVENOUS EVERY 4 HOURS PRN
Status: DISCONTINUED | OUTPATIENT
Start: 2023-02-18 | End: 2023-02-22 | Stop reason: HOSPADM

## 2023-02-18 RX ADMIN — RANOLAZINE 1000 MG: 500 TABLET, FILM COATED, EXTENDED RELEASE ORAL at 20:43

## 2023-02-18 RX ADMIN — HYDROXYZINE HYDROCHLORIDE 10 MG: 10 TABLET ORAL at 12:20

## 2023-02-18 RX ADMIN — TIZANIDINE 4 MG: 4 TABLET ORAL at 20:43

## 2023-02-18 RX ADMIN — MONTELUKAST 10 MG: 10 TABLET, FILM COATED ORAL at 08:57

## 2023-02-18 RX ADMIN — SODIUM CHLORIDE, PRESERVATIVE FREE 10 ML: 5 INJECTION INTRAVENOUS at 08:58

## 2023-02-18 RX ADMIN — RANOLAZINE 1000 MG: 500 TABLET, FILM COATED, EXTENDED RELEASE ORAL at 08:57

## 2023-02-18 RX ADMIN — TIZANIDINE 4 MG: 4 TABLET ORAL at 08:56

## 2023-02-18 RX ADMIN — INSULIN GLARGINE 8 UNITS: 100 INJECTION, SOLUTION SUBCUTANEOUS at 08:59

## 2023-02-18 RX ADMIN — AMITRIPTYLINE HYDROCHLORIDE 30 MG: 10 TABLET, FILM COATED ORAL at 20:43

## 2023-02-18 RX ADMIN — PANTOPRAZOLE SODIUM 40 MG: 40 TABLET, DELAYED RELEASE ORAL at 06:24

## 2023-02-18 RX ADMIN — BUPROPION HYDROCHLORIDE 150 MG: 150 TABLET, EXTENDED RELEASE ORAL at 08:57

## 2023-02-18 RX ADMIN — MORPHINE SULFATE 2 MG: 2 INJECTION, SOLUTION INTRAMUSCULAR; INTRAVENOUS at 15:36

## 2023-02-18 RX ADMIN — ENOXAPARIN SODIUM 30 MG: 100 INJECTION SUBCUTANEOUS at 20:42

## 2023-02-18 RX ADMIN — MORPHINE SULFATE 2 MG: 2 INJECTION, SOLUTION INTRAMUSCULAR; INTRAVENOUS at 20:50

## 2023-02-18 RX ADMIN — INSULIN LISPRO 2 UNITS: 100 INJECTION, SOLUTION INTRAVENOUS; SUBCUTANEOUS at 18:19

## 2023-02-18 RX ADMIN — MAGNESIUM SULFATE HEPTAHYDRATE 1000 MG: 1 INJECTION, SOLUTION INTRAVENOUS at 11:02

## 2023-02-18 RX ADMIN — ASPIRIN 81 MG CHEWABLE TABLET 81 MG: 81 TABLET CHEWABLE at 08:57

## 2023-02-18 RX ADMIN — Medication 2 PUFF: at 08:54

## 2023-02-18 RX ADMIN — INSULIN GLARGINE 8 UNITS: 100 INJECTION, SOLUTION SUBCUTANEOUS at 20:44

## 2023-02-18 RX ADMIN — DOCUSATE SODIUM 100 MG: 100 CAPSULE, LIQUID FILLED ORAL at 08:57

## 2023-02-18 RX ADMIN — ATORVASTATIN CALCIUM 80 MG: 40 TABLET, FILM COATED ORAL at 20:43

## 2023-02-18 RX ADMIN — INSULIN LISPRO 4 UNITS: 100 INJECTION, SOLUTION INTRAVENOUS; SUBCUTANEOUS at 20:47

## 2023-02-18 RX ADMIN — INSULIN LISPRO 2 UNITS: 100 INJECTION, SOLUTION INTRAVENOUS; SUBCUTANEOUS at 13:01

## 2023-02-18 RX ADMIN — QUETIAPINE FUMARATE 50 MG: 25 TABLET ORAL at 20:43

## 2023-02-18 RX ADMIN — CETIRIZINE HYDROCHLORIDE 10 MG: 10 TABLET, FILM COATED ORAL at 08:56

## 2023-02-18 RX ADMIN — DULOXETINE HYDROCHLORIDE 60 MG: 60 CAPSULE, DELAYED RELEASE ORAL at 08:57

## 2023-02-18 RX ADMIN — HYDROXYZINE HYDROCHLORIDE 10 MG: 10 TABLET ORAL at 23:57

## 2023-02-18 RX ADMIN — DOCUSATE SODIUM 100 MG: 100 CAPSULE, LIQUID FILLED ORAL at 20:42

## 2023-02-18 RX ADMIN — SODIUM CHLORIDE: 9 INJECTION, SOLUTION INTRAVENOUS at 10:58

## 2023-02-18 RX ADMIN — FUROSEMIDE 20 MG: 20 TABLET ORAL at 08:57

## 2023-02-18 RX ADMIN — ONDANSETRON 4 MG: 2 INJECTION INTRAMUSCULAR; INTRAVENOUS at 11:59

## 2023-02-18 RX ADMIN — Medication 2 PUFF: at 19:24

## 2023-02-18 ASSESSMENT — PAIN DESCRIPTION - DESCRIPTORS
DESCRIPTORS: CRUSHING;ACHING
DESCRIPTORS: ACHING

## 2023-02-18 ASSESSMENT — PAIN DESCRIPTION - LOCATION
LOCATION: CHEST
LOCATION: CHEST

## 2023-02-18 ASSESSMENT — PAIN DESCRIPTION - ORIENTATION
ORIENTATION: LEFT;MID
ORIENTATION: RIGHT;LEFT;MID

## 2023-02-18 ASSESSMENT — PAIN - FUNCTIONAL ASSESSMENT: PAIN_FUNCTIONAL_ASSESSMENT: ACTIVITIES ARE NOT PREVENTED

## 2023-02-18 ASSESSMENT — PAIN SCALES - GENERAL
PAINLEVEL_OUTOF10: 7
PAINLEVEL_OUTOF10: 8

## 2023-02-18 NOTE — PROGRESS NOTES
Hospital Medicine Progress Note     Date:  2/18/2023    PCP: Jalen Rojas MD (Tel: [phone])    Date of Admission: 2/17/2023    Chief complaint:   Chief Complaint   Patient presents with    Chest Pain     Onset around 0700; ems gave 324 ASA and 1 NTG w/ initial relief but pain has returned; middle and radiating to the left;        Brief admission history: 69-year-old female with history of anxiety and depression, gastroesophageal reflux disease, normocytic anemia, CKD stage III, COPD, paroxysmal atrial fibrillation (on anticoagulation), chronic metabolic acidosis, asthma, fibromyalgia, hyperlipidemia, essential hypertension, IBS, temporal arteritis, negative stress test in August 2022, who presents to the emergency room with complaints of substernal chest discomfort that woke the morning of 2/17/2023. She also reports associated lightheadedness. She has had some shortness of breath over the past week, mostly with exertion. In triage, she reports some improvement of chest discomfort with nitroglycerin,; however, at the time of my evaluation, she maintains that chest discomfort did not improve with nitroglycerin. Troponin is negative. EKG with no ischemia. Chest x-ray with trace fissural thickening concerning for possible pulmonary edema. She was admitted for further evaluation and management. Assessment/plan:  Chest pain. Initial EKG and troponin unremarkable. Patient with negative stress test in August 2022. No significant improvement with nitroglycerin. Continue antiplatelet therapy, beta-blocker, statin. Troponin was relatively flat from 0.02-0.03. She was evaluated by cardiologist, noted to have noncardiac chest pain. Hypertensive urgency, much improved. Currently holding rest of antihypertensive medications for now, as patient has developed acute kidney injury from quick blood pressure control. Acute kidney injury on CKD stage III, not present on admission.   Likely combination of IV Lasix as well as blood pressure control (as systolic blood pressure improved significantly following slight adjustment to medications, which may account for possible noncompliance). Will hold some of antihypertensive medications for now. Gentle anthony infusion overnight. Recheck kidney function in the morning. Mild hyperkalemia, potassium 5.3, resolved with IV Lasix. Other comorbidities: history of anxiety and depression, gastroesophageal reflux disease, normocytic anemia, CKD stage III, COPD, paroxysmal atrial fibrillation (on anticoagulation), chronic metabolic acidosis, asthma, fibromyalgia, hyperlipidemia, essential hypertension, IBS, temporal arteritis, negative stress test in August 2022. Disposition. Possible discharge tomorrow. Diet: ADULT DIET; Regular; 4 carb choices (60 gm/meal); 2000 ml; No Caffeine    Code status: Full Code   ----------  Subjective  Reports some chest pain still. Asking for Valium for anxiety; states takes at home (PDMP, patient is not on Valium)    Objective  Physical exam:  Vitals: BP (!) 115/56   Pulse 63   Temp 98.4 °F (36.9 °C) (Oral)   Resp 14   Ht 5' (1.524 m)   Wt 132 lb 4.4 oz (60 kg)   SpO2 97%   BMI 25.83 kg/m²   Gen/overall appearance: Not in acute distress. Alert. Oriented x3  Head: Normocephalic, atraumatic  Eyes: EOMI, good acuity  ENT: Oral mucosa moist  Neck: No JVD, thyromegaly  CVS: Nml S1S2, no MRG, RRR  Pulm: Clear bilaterally. No crackles/wheezes  Gastrointestinal: Soft, NT/ND, +BS  Musculoskeletal: No edema. Warm  Neuro: No focal deficit. Moves extremity spontaneously. Psychiatry: Appropriate affect. Not agitated. Skin: Warm, dry with normal turgor.  No rash  Capillary refill: Brisk,< 3 seconds   Peripheral Pulses: +2 palpable, equal bilaterally      24HR INTAKE/OUTPUT:    Intake/Output Summary (Last 24 hours) at 2/18/2023 1050  Last data filed at 2/18/2023 1038  Gross per 24 hour   Intake 240 ml   Output 400 ml   Net -160 ml     I/O last 3 completed shifts:  In: -   Out: 400 [Urine:400]  I/O this shift:  In: 240 [P.O.:240]  Out: -   Meds:    enoxaparin  30 mg SubCUTAneous Nightly    [Held by provider] carvedilol  6.25 mg Oral BID WC    magnesium sulfate  1,000 mg IntraVENous Once    amitriptyline  30 mg Oral Nightly    amLODIPine  10 mg Oral Daily    atorvastatin  80 mg Oral Nightly    buPROPion  150 mg Oral Daily    docusate sodium  100 mg Oral BID    DULoxetine  60 mg Oral Daily    cetirizine  10 mg Oral Daily    mometasone-formoterol  2 puff Inhalation BID    [Held by provider] furosemide  20 mg Oral Daily    [Held by provider] isosorbide mononitrate  60 mg Oral Daily    montelukast  10 mg Oral Daily    pantoprazole  40 mg Oral QAM AC    ranolazine  1,000 mg Oral BID    insulin lispro  0-4 Units SubCUTAneous TID WC    insulin lispro  0-4 Units SubCUTAneous Nightly    sodium chloride flush  10 mL IntraVENous 2 times per day    aspirin  81 mg Oral Daily    insulin glargine  8 Units SubCUTAneous BID    tiZANidine  4 mg Oral BID    QUEtiapine  50 mg Oral Nightly    linaclotide  290 mcg Oral QAM AC     Infusions:    sodium chloride      dextrose      sodium chloride       PRN Meds: glucose, dextrose bolus **OR** dextrose bolus, glucagon (rDNA), dextrose, sodium chloride flush, sodium chloride, acetaminophen **OR** acetaminophen, polyethylene glycol, morphine, labetalol    Labs/imaging:  CBC:   Recent Labs     02/17/23 1047 02/18/23  0520   WBC 6.7 4.6   HGB 10.4* 10.1*    189     BMP:    Recent Labs     02/17/23 1047 02/18/23  0519   * 135*   K 5.3* 4.3    100   CO2 18* 20*   BUN 21* 26*   CREATININE 1.3* 2.3*   GLUCOSE 209* 190*     Hepatic:   Recent Labs     02/17/23 1047 02/18/23  0519   AST 27 22   ALT 9* 10   BILITOT <0.2 0.3   ALKPHOS 160* 161*       Basia Olea MD  -------------------------------  [facility]

## 2023-02-19 PROBLEM — N17.9 ACUTE KIDNEY INJURY (HCC): Status: ACTIVE | Noted: 2023-02-19

## 2023-02-19 LAB
A/G RATIO: 0.8 (ref 1.1–2.2)
ALBUMIN SERPL-MCNC: 3.2 G/DL (ref 3.4–5)
ALP BLD-CCNC: 157 U/L (ref 40–129)
ALT SERPL-CCNC: 9 U/L (ref 10–40)
ANION GAP SERPL CALCULATED.3IONS-SCNC: 14 MMOL/L (ref 3–16)
AST SERPL-CCNC: 15 U/L (ref 15–37)
BASOPHILS ABSOLUTE: 0 K/UL (ref 0–0.2)
BASOPHILS RELATIVE PERCENT: 0.5 %
BILIRUB SERPL-MCNC: 0.3 MG/DL (ref 0–1)
BUN BLDV-MCNC: 36 MG/DL (ref 7–20)
CALCIUM SERPL-MCNC: 9 MG/DL (ref 8.3–10.6)
CHLORIDE BLD-SCNC: 97 MMOL/L (ref 99–110)
CO2: 22 MMOL/L (ref 21–32)
CREAT SERPL-MCNC: 2.7 MG/DL (ref 0.6–1.2)
EOSINOPHILS ABSOLUTE: 0.3 K/UL (ref 0–0.6)
EOSINOPHILS RELATIVE PERCENT: 6 %
GFR SERPL CREATININE-BSD FRML MDRD: 19 ML/MIN/{1.73_M2}
GLUCOSE BLD-MCNC: 187 MG/DL (ref 70–99)
GLUCOSE BLD-MCNC: 205 MG/DL (ref 70–99)
GLUCOSE BLD-MCNC: 219 MG/DL (ref 70–99)
GLUCOSE BLD-MCNC: 225 MG/DL (ref 70–99)
GLUCOSE BLD-MCNC: 253 MG/DL (ref 70–99)
HCT VFR BLD CALC: 28.7 % (ref 36–48)
HEMOGLOBIN: 9.3 G/DL (ref 12–16)
LYMPHOCYTES ABSOLUTE: 1.3 K/UL (ref 1–5.1)
LYMPHOCYTES RELATIVE PERCENT: 23.7 %
MAGNESIUM: 2.1 MG/DL (ref 1.8–2.4)
MCH RBC QN AUTO: 30.4 PG (ref 26–34)
MCHC RBC AUTO-ENTMCNC: 32.4 G/DL (ref 31–36)
MCV RBC AUTO: 93.7 FL (ref 80–100)
MONOCYTES ABSOLUTE: 0.3 K/UL (ref 0–1.3)
MONOCYTES RELATIVE PERCENT: 6 %
NEUTROPHILS ABSOLUTE: 3.5 K/UL (ref 1.7–7.7)
NEUTROPHILS RELATIVE PERCENT: 63.8 %
PDW BLD-RTO: 16.1 % (ref 12.4–15.4)
PERFORMED ON: ABNORMAL
PHOSPHORUS: 5 MG/DL (ref 2.5–4.9)
PLATELET # BLD: 209 K/UL (ref 135–450)
PMV BLD AUTO: 7.4 FL (ref 5–10.5)
POTASSIUM SERPL-SCNC: 4.6 MMOL/L (ref 3.5–5.1)
RBC # BLD: 3.06 M/UL (ref 4–5.2)
SODIUM BLD-SCNC: 133 MMOL/L (ref 136–145)
TOTAL PROTEIN: 7 G/DL (ref 6.4–8.2)
WBC # BLD: 5.5 K/UL (ref 4–11)

## 2023-02-19 PROCEDURE — 6360000002 HC RX W HCPCS: Performed by: INTERNAL MEDICINE

## 2023-02-19 PROCEDURE — 6370000000 HC RX 637 (ALT 250 FOR IP): Performed by: INTERNAL MEDICINE

## 2023-02-19 PROCEDURE — 94760 N-INVAS EAR/PLS OXIMETRY 1: CPT

## 2023-02-19 PROCEDURE — G0378 HOSPITAL OBSERVATION PER HR: HCPCS

## 2023-02-19 PROCEDURE — 83735 ASSAY OF MAGNESIUM: CPT

## 2023-02-19 PROCEDURE — 2000000000 HC ICU R&B

## 2023-02-19 PROCEDURE — 84100 ASSAY OF PHOSPHORUS: CPT

## 2023-02-19 PROCEDURE — 94640 AIRWAY INHALATION TREATMENT: CPT

## 2023-02-19 PROCEDURE — 2580000003 HC RX 258: Performed by: INTERNAL MEDICINE

## 2023-02-19 PROCEDURE — 36415 COLL VENOUS BLD VENIPUNCTURE: CPT

## 2023-02-19 PROCEDURE — 80053 COMPREHEN METABOLIC PANEL: CPT

## 2023-02-19 PROCEDURE — 85025 COMPLETE CBC W/AUTO DIFF WBC: CPT

## 2023-02-19 PROCEDURE — 1200000000 HC SEMI PRIVATE

## 2023-02-19 RX ORDER — HEPARIN SODIUM 5000 [USP'U]/ML
5000 INJECTION, SOLUTION INTRAVENOUS; SUBCUTANEOUS EVERY 8 HOURS SCHEDULED
Status: DISCONTINUED | OUTPATIENT
Start: 2023-02-20 | End: 2023-02-22 | Stop reason: HOSPADM

## 2023-02-19 RX ORDER — SODIUM CHLORIDE 9 MG/ML
INJECTION, SOLUTION INTRAVENOUS CONTINUOUS
Status: DISCONTINUED | OUTPATIENT
Start: 2023-02-19 | End: 2023-02-19

## 2023-02-19 RX ADMIN — ATORVASTATIN CALCIUM 80 MG: 40 TABLET, FILM COATED ORAL at 20:42

## 2023-02-19 RX ADMIN — RANOLAZINE 1000 MG: 500 TABLET, FILM COATED, EXTENDED RELEASE ORAL at 09:06

## 2023-02-19 RX ADMIN — DULOXETINE HYDROCHLORIDE 60 MG: 60 CAPSULE, DELAYED RELEASE ORAL at 09:06

## 2023-02-19 RX ADMIN — RANOLAZINE 1000 MG: 500 TABLET, FILM COATED, EXTENDED RELEASE ORAL at 20:41

## 2023-02-19 RX ADMIN — INSULIN LISPRO 2 UNITS: 100 INJECTION, SOLUTION INTRAVENOUS; SUBCUTANEOUS at 13:20

## 2023-02-19 RX ADMIN — AMLODIPINE BESYLATE 10 MG: 5 TABLET ORAL at 09:06

## 2023-02-19 RX ADMIN — INSULIN GLARGINE 8 UNITS: 100 INJECTION, SOLUTION SUBCUTANEOUS at 09:08

## 2023-02-19 RX ADMIN — Medication 2 PUFF: at 20:53

## 2023-02-19 RX ADMIN — INSULIN LISPRO 1 UNITS: 100 INJECTION, SOLUTION INTRAVENOUS; SUBCUTANEOUS at 18:06

## 2023-02-19 RX ADMIN — MORPHINE SULFATE 2 MG: 2 INJECTION, SOLUTION INTRAMUSCULAR; INTRAVENOUS at 18:42

## 2023-02-19 RX ADMIN — TIZANIDINE 4 MG: 4 TABLET ORAL at 09:06

## 2023-02-19 RX ADMIN — TIZANIDINE 4 MG: 4 TABLET ORAL at 20:42

## 2023-02-19 RX ADMIN — SODIUM CHLORIDE, PRESERVATIVE FREE 10 ML: 5 INJECTION INTRAVENOUS at 09:07

## 2023-02-19 RX ADMIN — ASPIRIN 81 MG CHEWABLE TABLET 81 MG: 81 TABLET CHEWABLE at 09:06

## 2023-02-19 RX ADMIN — ONDANSETRON 4 MG: 2 INJECTION INTRAMUSCULAR; INTRAVENOUS at 20:47

## 2023-02-19 RX ADMIN — DOCUSATE SODIUM 100 MG: 100 CAPSULE, LIQUID FILLED ORAL at 20:43

## 2023-02-19 RX ADMIN — QUETIAPINE FUMARATE 50 MG: 25 TABLET ORAL at 20:42

## 2023-02-19 RX ADMIN — DOCUSATE SODIUM 100 MG: 100 CAPSULE, LIQUID FILLED ORAL at 09:07

## 2023-02-19 RX ADMIN — PANTOPRAZOLE SODIUM 40 MG: 40 TABLET, DELAYED RELEASE ORAL at 06:27

## 2023-02-19 RX ADMIN — BUPROPION HYDROCHLORIDE 150 MG: 150 TABLET, EXTENDED RELEASE ORAL at 09:06

## 2023-02-19 RX ADMIN — INSULIN LISPRO 1 UNITS: 100 INJECTION, SOLUTION INTRAVENOUS; SUBCUTANEOUS at 09:07

## 2023-02-19 RX ADMIN — MONTELUKAST 10 MG: 10 TABLET, FILM COATED ORAL at 09:06

## 2023-02-19 RX ADMIN — HYDROXYZINE HYDROCHLORIDE 10 MG: 10 TABLET ORAL at 13:20

## 2023-02-19 RX ADMIN — Medication 2 PUFF: at 08:34

## 2023-02-19 RX ADMIN — SODIUM CHLORIDE: 9 INJECTION, SOLUTION INTRAVENOUS at 11:48

## 2023-02-19 RX ADMIN — INSULIN GLARGINE 8 UNITS: 100 INJECTION, SOLUTION SUBCUTANEOUS at 20:42

## 2023-02-19 RX ADMIN — MORPHINE SULFATE 2 MG: 2 INJECTION, SOLUTION INTRAMUSCULAR; INTRAVENOUS at 04:07

## 2023-02-19 RX ADMIN — CETIRIZINE HYDROCHLORIDE 10 MG: 10 TABLET, FILM COATED ORAL at 09:06

## 2023-02-19 RX ADMIN — AMITRIPTYLINE HYDROCHLORIDE 30 MG: 10 TABLET, FILM COATED ORAL at 20:41

## 2023-02-19 ASSESSMENT — PAIN DESCRIPTION - DESCRIPTORS
DESCRIPTORS: CRUSHING;ACHING
DESCRIPTORS: ACHING;CRUSHING
DESCRIPTORS: ACHING

## 2023-02-19 ASSESSMENT — PAIN DESCRIPTION - LOCATION
LOCATION: CHEST

## 2023-02-19 ASSESSMENT — PAIN - FUNCTIONAL ASSESSMENT: PAIN_FUNCTIONAL_ASSESSMENT: ACTIVITIES ARE NOT PREVENTED

## 2023-02-19 ASSESSMENT — PAIN SCALES - GENERAL
PAINLEVEL_OUTOF10: 3
PAINLEVEL_OUTOF10: 0
PAINLEVEL_OUTOF10: 7
PAINLEVEL_OUTOF10: 9

## 2023-02-19 ASSESSMENT — PAIN DESCRIPTION - ORIENTATION
ORIENTATION: LEFT;MID
ORIENTATION: RIGHT;LEFT;MID

## 2023-02-19 ASSESSMENT — PAIN SCALES - WONG BAKER: WONGBAKER_NUMERICALRESPONSE: 0

## 2023-02-19 NOTE — CONSULTS
Nephrology ([facility]) Consult Note    Kyle Wolf is a 78 y.o. female whom we were asked to see for KOLBY on CKD. The patient presents with substernal chest discomfort with lightheadedness without improvement with NTG. No EKG changes, and troponins were negative. 02/17/23 Cr 1.3  's  02/18/23 Cr 2.3  02/19/23 Cr 2.7    She does report having intermittent episodes when her blood pressure is high. Although she does not think she missed any medication doses; I suspect she missed dose(s) as her blood pressure is well controlled here. She uses a pill box. However, she does not take medications evenly spaced throughout the day. Past Medical History:  CKD3a   - sees Dr. Vladislav Nails, baseline Cr 1.5-1.9  COPD  pAF  HTN  HLP      Review of System:  Otherwise unremarkable    Allergies:  Patient has no known allergies. Medications:  Current medications reviewed. Social History:  Family Medical History:  Negative for kidney disase    Physical Exam:  Blood pressure 129/73, pulse 72, temperature 97.7 °F (36.5 °C), temperature source Oral, resp. rate 30, height 5' (1.524 m), weight 136 lb 7.4 oz (61.9 kg), SpO2 96 %, not currently breastfeeding.     General:  NAD, A+Ox3, ill-appearing, normal body habitus  HEENT:  PERRL, EOMI  Neck:  Supple, normal range of movement  Chest:  CTAB, good respiratory effort, good air movement  CV:  RRR, no murmurs or rubs, no carotid bruit, no abdominal bruits  Abdomen:  NTND, soft, +BS, no hepatosplenomegaly  Extremities:  No peripheral edema  Neurological:  Moving all four extremities, CN II-XII grossly intact  Lymphatics:  No palpable lymph nodes  Skin:  No rash, no jaundice  Psychiatric:  moderate insight and judgement, good recall    Laboratory Studies:  Lab Results   Component Value Date/Time     (L) 02/19/2023 04:44 AM    K 4.6 02/19/2023 04:44 AM    K 5.3 (H) 02/17/2023 10:47 AM    CL 97 (L) 02/19/2023 04:44 AM    CO2 22 02/19/2023 04:44 AM BUN 36 (H) 02/19/2023 04:44 AM    CREATININE 2.7 (H) 02/19/2023 04:44 AM    CALCIUM 9.0 02/19/2023 04:44 AM    PHOS 5.0 (H) 02/19/2023 04:44 AM    MG 2.10 02/19/2023 04:44 AM     Lab Results   Component Value Date/Time    WBC 5.5 02/19/2023 04:44 AM    HGB 9.3 (L) 02/19/2023 04:44 AM    HCT 28.7 (L) 02/19/2023 04:44 AM     02/19/2023 04:44 AM       Assessment/Plan:  Reviewed old records and labs. 1) KOLBY on CKD   - renal function worsening likely secondary to impaired renal autoregulation due to uncontrolled HTN on admission   - check urine studies   - may need to start dialysis   - please hold off giving further diuretics   - d/c IVF as this is not a pre-renal state. 2) HTN   - BP was okay in the office recently   - BP adequately controlled   - reinforced a lower sodium diet    3) chest discomfort   - seen by cardiology and cleared    4) anemia   - check iron studies    Please change the patient to inpatient status.

## 2023-02-19 NOTE — PROGRESS NOTES
Narcotic Waste Documentation    Administered 0 mg of morphine and wasted 2 mg per [facility].

## 2023-02-19 NOTE — PROGRESS NOTES
Hospital Medicine Progress Note     Date:  2/19/2023    PCP: Jorge Luis Cortes MD (Tel: [phone])    Date of Admission: 2/17/2023    Chief complaint:   Chief Complaint   Patient presents with    Chest Pain     Onset around 0700; ems gave 324 ASA and 1 NTG w/ initial relief but pain has returned; middle and radiating to the left;        Brief admission history: 70-year-old female with history of anxiety and depression, gastroesophageal reflux disease, normocytic anemia, CKD stage III, COPD, paroxysmal atrial fibrillation (on anticoagulation), chronic metabolic acidosis, asthma, fibromyalgia, hyperlipidemia, essential hypertension, IBS, temporal arteritis, negative stress test in August 2022, who presents to the emergency room with complaints of substernal chest discomfort that woke the morning of 2/17/2023. She also reports associated lightheadedness. She has had some shortness of breath over the past week, mostly with exertion. In triage, she reports some improvement of chest discomfort with nitroglycerin,; however, at the time of my evaluation, she maintains that chest discomfort did not improve with nitroglycerin. Troponin is negative. EKG with no ischemia. Chest x-ray with trace fissural thickening concerning for possible pulmonary edema. She was admitted for further evaluation and management. Assessment/plan:  Chest pain. Initial EKG and troponin unremarkable. Patient with negative stress test in August 2022. No significant improvement with nitroglycerin. Continue antiplatelet therapy, beta-blocker, statin. Troponin was relatively flat from 0.02-0.03. She was evaluated by cardiologist, noted to have noncardiac chest pain. Hypertensive urgency, resolved. Currently holding most of antihypertensive medications for now, as patient has developed acute kidney injury from quick blood pressure control.   Suspect patient was not compliant with medication use prior to admission, as BP dropped on resumption of home medications. Acute kidney injury on CKD stage III, not present on admission. Likely combination of IV Lasix as well as blood pressure control (BP significantly elevated on admission). Holding most of antihypertensive medications for now. Continue intravenous fluid. Nephrologist has been consulted. Mild hyperkalemia, potassium 5.3, resolved with IV Lasix. Other comorbidities: history of anxiety and depression, gastroesophageal reflux disease, normocytic anemia, CKD stage III, COPD, paroxysmal atrial fibrillation (on anticoagulation), chronic metabolic acidosis, asthma, fibromyalgia, hyperlipidemia, essential hypertension, IBS, temporal arteritis, negative stress test in August 2022. Disposition. Discharge date to be determined. Diet: ADULT DIET; Regular; 4 carb choices (60 gm/meal); No Caffeine    Code status: Full Code   ----------  Subjective  No new issues today. Objective  Physical exam:  Vitals: BP (!) 128/51   Pulse 66   Temp 97.8 °F (36.6 °C) (Oral)   Resp 18   Ht 5' (1.524 m)   Wt 136 lb 7.4 oz (61.9 kg)   SpO2 98%   BMI 26.65 kg/m²   Gen/overall appearance: Not in acute distress. Alert. Oriented x3  Head: Normocephalic, atraumatic  Eyes: EOMI, good acuity  ENT: Oral mucosa moist  Neck: No JVD, thyromegaly  CVS: Nml S1S2, no MRG, RRR  Pulm: Clear bilaterally. No crackles/wheezes  Gastrointestinal: Soft, NT/ND, +BS  Musculoskeletal: No edema. Warm  Neuro: No focal deficit. Moves extremity spontaneously. Psychiatry: Appropriate affect. Not agitated. Skin: Warm, dry with normal turgor. No rash  Capillary refill: Brisk,< 3 seconds   Peripheral Pulses: +2 palpable, equal bilaterally      24HR INTAKE/OUTPUT:    Intake/Output Summary (Last 24 hours) at 2/19/2023 1201  Last data filed at 2/19/2023 0944  Gross per 24 hour   Intake 1246 ml   Output 300 ml   Net 946 ml       I/O last 3 completed shifts:   In: 6763 [P.O.:1080; I.V.:166]  Out: 300 [Urine:300]  I/O this shift:  In: 240 [P.O.:240]  Out: -   Meds:    [START ON 2/20/2023] heparin (porcine)  5,000 Units SubCUTAneous 3 times per day    [Held by provider] carvedilol  6.25 mg Oral BID WC    amitriptyline  30 mg Oral Nightly    [Held by provider] amLODIPine  10 mg Oral Daily    atorvastatin  80 mg Oral Nightly    buPROPion  150 mg Oral Daily    docusate sodium  100 mg Oral BID    DULoxetine  60 mg Oral Daily    cetirizine  10 mg Oral Daily    mometasone-formoterol  2 puff Inhalation BID    [Held by provider] furosemide  20 mg Oral Daily    [Held by provider] isosorbide mononitrate  60 mg Oral Daily    montelukast  10 mg Oral Daily    [Held by provider] pantoprazole  40 mg Oral QAM AC    ranolazine  1,000 mg Oral BID    insulin lispro  0-4 Units SubCUTAneous TID WC    insulin lispro  0-4 Units SubCUTAneous Nightly    sodium chloride flush  10 mL IntraVENous 2 times per day    aspirin  81 mg Oral Daily    insulin glargine  8 Units SubCUTAneous BID    tiZANidine  4 mg Oral BID    QUEtiapine  50 mg Oral Nightly    linaclotide  290 mcg Oral QAM AC     Infusions:    sodium chloride 125 mL/hr at 02/19/23 1148    dextrose      sodium chloride       PRN Meds: labetalol, trimethobenzamide, ondansetron, promethazine, hydrOXYzine HCl, glucose, dextrose bolus **OR** dextrose bolus, glucagon (rDNA), dextrose, sodium chloride flush, sodium chloride, acetaminophen **OR** acetaminophen, polyethylene glycol, morphine    Labs/imaging:  CBC:   Recent Labs     02/17/23  1047 02/18/23  0520 02/19/23  0444   WBC 6.7 4.6 5.5   HGB 10.4* 10.1* 9.3*    189 209       BMP:    Recent Labs     02/17/23  1047 02/18/23  0519 02/19/23  0444   * 135* 133*   K 5.3* 4.3 4.6    100 97*   CO2 18* 20* 22   BUN 21* 26* 36*   CREATININE 1.3* 2.3* 2.7*   GLUCOSE 209* 190* 219*       Hepatic:   Recent Labs     02/17/23  1047 02/18/23  0519 02/19/23  0444   AST 27 22 15   ALT 9* 10 9*   BILITOT <0.2 0.3 0.3   ALKPHOS 160* 161* 157*         Basia Olea MD  -------------------------------  [facility]

## 2023-02-20 LAB
ANION GAP SERPL CALCULATED.3IONS-SCNC: 13 MMOL/L (ref 3–16)
BACTERIA: NORMAL /HPF
BASOPHILS ABSOLUTE: 0 K/UL (ref 0–0.2)
BASOPHILS RELATIVE PERCENT: 0.4 %
BILIRUBIN URINE: NEGATIVE
BLOOD, URINE: NEGATIVE
BUN BLDV-MCNC: 36 MG/DL (ref 7–20)
CALCIUM SERPL-MCNC: 8.8 MG/DL (ref 8.3–10.6)
CHLORIDE BLD-SCNC: 104 MMOL/L (ref 99–110)
CLARITY: CLEAR
CO2: 18 MMOL/L (ref 21–32)
COLOR: YELLOW
CREAT SERPL-MCNC: 1.9 MG/DL (ref 0.6–1.2)
CREATININE URINE: 122.3 MG/DL (ref 28–259)
EOSINOPHILS ABSOLUTE: 0.3 K/UL (ref 0–0.6)
EOSINOPHILS RELATIVE PERCENT: 4.2 %
EPITHELIAL CELLS, UA: 2 /HPF (ref 0–5)
GFR SERPL CREATININE-BSD FRML MDRD: 29 ML/MIN/{1.73_M2}
GLUCOSE BLD-MCNC: 132 MG/DL (ref 70–99)
GLUCOSE BLD-MCNC: 162 MG/DL (ref 70–99)
GLUCOSE BLD-MCNC: 167 MG/DL (ref 70–99)
GLUCOSE BLD-MCNC: 206 MG/DL (ref 70–99)
GLUCOSE BLD-MCNC: 255 MG/DL (ref 70–99)
GLUCOSE URINE: NEGATIVE MG/DL
HCT VFR BLD CALC: 27.8 % (ref 36–48)
HEMOGLOBIN: 9 G/DL (ref 12–16)
HYALINE CASTS: 1 /LPF (ref 0–8)
KETONES, URINE: NEGATIVE MG/DL
LEUKOCYTE ESTERASE, URINE: NEGATIVE
LYMPHOCYTES ABSOLUTE: 1.3 K/UL (ref 1–5.1)
LYMPHOCYTES RELATIVE PERCENT: 19.3 %
MCH RBC QN AUTO: 30.5 PG (ref 26–34)
MCHC RBC AUTO-ENTMCNC: 32.4 G/DL (ref 31–36)
MCV RBC AUTO: 94.2 FL (ref 80–100)
MICROSCOPIC EXAMINATION: YES
MONOCYTES ABSOLUTE: 0.4 K/UL (ref 0–1.3)
MONOCYTES RELATIVE PERCENT: 6 %
NEUTROPHILS ABSOLUTE: 4.7 K/UL (ref 1.7–7.7)
NEUTROPHILS RELATIVE PERCENT: 70.1 %
NITRITE, URINE: NEGATIVE
PDW BLD-RTO: 15.7 % (ref 12.4–15.4)
PERFORMED ON: ABNORMAL
PH UA: 5 (ref 5–8)
PLATELET # BLD: 215 K/UL (ref 135–450)
PMV BLD AUTO: 7.7 FL (ref 5–10.5)
POTASSIUM SERPL-SCNC: 4.6 MMOL/L (ref 3.5–5.1)
PRO-BNP: NORMAL PG/ML (ref 0–124)
PROTEIN UA: 100 MG/DL
RBC # BLD: 2.95 M/UL (ref 4–5.2)
RBC UA: 0 /HPF (ref 0–4)
SODIUM BLD-SCNC: 135 MMOL/L (ref 136–145)
SODIUM URINE: 52 MMOL/L
SPECIFIC GRAVITY UA: 1.01 (ref 1–1.03)
UREA NITROGEN, UR: 549.3 MG/DL (ref 800–1666)
URINE TYPE: ABNORMAL
UROBILINOGEN, URINE: 0.2 E.U./DL
WBC # BLD: 6.7 K/UL (ref 4–11)
WBC UA: 0 /HPF (ref 0–5)

## 2023-02-20 PROCEDURE — 80048 BASIC METABOLIC PNL TOTAL CA: CPT

## 2023-02-20 PROCEDURE — 84300 ASSAY OF URINE SODIUM: CPT

## 2023-02-20 PROCEDURE — 94640 AIRWAY INHALATION TREATMENT: CPT

## 2023-02-20 PROCEDURE — 84540 ASSAY OF URINE/UREA-N: CPT

## 2023-02-20 PROCEDURE — 94760 N-INVAS EAR/PLS OXIMETRY 1: CPT

## 2023-02-20 PROCEDURE — 81001 URINALYSIS AUTO W/SCOPE: CPT

## 2023-02-20 PROCEDURE — 1200000000 HC SEMI PRIVATE

## 2023-02-20 PROCEDURE — 2500000003 HC RX 250 WO HCPCS: Performed by: INTERNAL MEDICINE

## 2023-02-20 PROCEDURE — 6370000000 HC RX 637 (ALT 250 FOR IP): Performed by: INTERNAL MEDICINE

## 2023-02-20 PROCEDURE — 83880 ASSAY OF NATRIURETIC PEPTIDE: CPT

## 2023-02-20 PROCEDURE — 85025 COMPLETE CBC W/AUTO DIFF WBC: CPT

## 2023-02-20 PROCEDURE — 2580000003 HC RX 258: Performed by: INTERNAL MEDICINE

## 2023-02-20 PROCEDURE — 82570 ASSAY OF URINE CREATININE: CPT

## 2023-02-20 PROCEDURE — 36415 COLL VENOUS BLD VENIPUNCTURE: CPT

## 2023-02-20 PROCEDURE — 6360000002 HC RX W HCPCS: Performed by: INTERNAL MEDICINE

## 2023-02-20 RX ADMIN — MORPHINE SULFATE 2 MG: 2 INJECTION, SOLUTION INTRAMUSCULAR; INTRAVENOUS at 20:00

## 2023-02-20 RX ADMIN — Medication 2 PUFF: at 08:26

## 2023-02-20 RX ADMIN — MORPHINE SULFATE 2 MG: 2 INJECTION, SOLUTION INTRAMUSCULAR; INTRAVENOUS at 04:46

## 2023-02-20 RX ADMIN — Medication 2 PUFF: at 19:46

## 2023-02-20 RX ADMIN — HYDROXYZINE HYDROCHLORIDE 10 MG: 10 TABLET ORAL at 13:04

## 2023-02-20 RX ADMIN — QUETIAPINE FUMARATE 50 MG: 25 TABLET ORAL at 21:15

## 2023-02-20 RX ADMIN — MONTELUKAST 10 MG: 10 TABLET, FILM COATED ORAL at 07:53

## 2023-02-20 RX ADMIN — MORPHINE SULFATE 2 MG: 2 INJECTION, SOLUTION INTRAMUSCULAR; INTRAVENOUS at 11:05

## 2023-02-20 RX ADMIN — LABETALOL HYDROCHLORIDE 5 MG: 5 INJECTION INTRAVENOUS at 20:13

## 2023-02-20 RX ADMIN — SODIUM CHLORIDE, PRESERVATIVE FREE 10 ML: 5 INJECTION INTRAVENOUS at 21:47

## 2023-02-20 RX ADMIN — HEPARIN SODIUM 5000 UNITS: 5000 INJECTION INTRAVENOUS; SUBCUTANEOUS at 04:49

## 2023-02-20 RX ADMIN — BUPROPION HYDROCHLORIDE 150 MG: 150 TABLET, EXTENDED RELEASE ORAL at 07:53

## 2023-02-20 RX ADMIN — TIZANIDINE 4 MG: 4 TABLET ORAL at 07:53

## 2023-02-20 RX ADMIN — RANOLAZINE 1000 MG: 500 TABLET, FILM COATED, EXTENDED RELEASE ORAL at 21:15

## 2023-02-20 RX ADMIN — RANOLAZINE 1000 MG: 500 TABLET, FILM COATED, EXTENDED RELEASE ORAL at 07:53

## 2023-02-20 RX ADMIN — TIZANIDINE 4 MG: 4 TABLET ORAL at 21:15

## 2023-02-20 RX ADMIN — INSULIN GLARGINE 8 UNITS: 100 INJECTION, SOLUTION SUBCUTANEOUS at 08:56

## 2023-02-20 RX ADMIN — CETIRIZINE HYDROCHLORIDE 10 MG: 10 TABLET, FILM COATED ORAL at 07:53

## 2023-02-20 RX ADMIN — ATORVASTATIN CALCIUM 80 MG: 40 TABLET, FILM COATED ORAL at 21:15

## 2023-02-20 RX ADMIN — DOCUSATE SODIUM 100 MG: 100 CAPSULE, LIQUID FILLED ORAL at 21:47

## 2023-02-20 RX ADMIN — AMITRIPTYLINE HYDROCHLORIDE 30 MG: 10 TABLET, FILM COATED ORAL at 21:15

## 2023-02-20 RX ADMIN — ASPIRIN 81 MG CHEWABLE TABLET 81 MG: 81 TABLET CHEWABLE at 07:53

## 2023-02-20 RX ADMIN — INSULIN GLARGINE 8 UNITS: 100 INJECTION, SOLUTION SUBCUTANEOUS at 21:15

## 2023-02-20 RX ADMIN — DULOXETINE HYDROCHLORIDE 60 MG: 60 CAPSULE, DELAYED RELEASE ORAL at 07:53

## 2023-02-20 RX ADMIN — HEPARIN SODIUM 5000 UNITS: 5000 INJECTION INTRAVENOUS; SUBCUTANEOUS at 13:04

## 2023-02-20 RX ADMIN — HEPARIN SODIUM 5000 UNITS: 5000 INJECTION INTRAVENOUS; SUBCUTANEOUS at 21:15

## 2023-02-20 RX ADMIN — INSULIN LISPRO 1 UNITS: 100 INJECTION, SOLUTION INTRAVENOUS; SUBCUTANEOUS at 17:18

## 2023-02-20 RX ADMIN — DOCUSATE SODIUM 100 MG: 100 CAPSULE, LIQUID FILLED ORAL at 07:53

## 2023-02-20 ASSESSMENT — PAIN SCALES - GENERAL
PAINLEVEL_OUTOF10: 8
PAINLEVEL_OUTOF10: 8
PAINLEVEL_OUTOF10: 7
PAINLEVEL_OUTOF10: 8
PAINLEVEL_OUTOF10: 5

## 2023-02-20 ASSESSMENT — PAIN DESCRIPTION - ORIENTATION
ORIENTATION: RIGHT
ORIENTATION: MID

## 2023-02-20 ASSESSMENT — PAIN DESCRIPTION - DESCRIPTORS
DESCRIPTORS: DISCOMFORT
DESCRIPTORS: ACHING
DESCRIPTORS: DISCOMFORT
DESCRIPTORS: DISCOMFORT;ACHING
DESCRIPTORS: ACHING

## 2023-02-20 ASSESSMENT — PAIN DESCRIPTION - LOCATION
LOCATION: CHEST
LOCATION: HEAD
LOCATION: CHEST
LOCATION: CHEST
LOCATION: HEAD

## 2023-02-20 ASSESSMENT — PAIN DESCRIPTION - FREQUENCY
FREQUENCY: CONTINUOUS

## 2023-02-20 ASSESSMENT — PAIN DESCRIPTION - ONSET
ONSET: ON-GOING

## 2023-02-20 ASSESSMENT — PAIN DESCRIPTION - PAIN TYPE
TYPE: ACUTE PAIN
TYPE: ACUTE PAIN

## 2023-02-20 ASSESSMENT — PAIN - FUNCTIONAL ASSESSMENT
PAIN_FUNCTIONAL_ASSESSMENT: ACTIVITIES ARE NOT PREVENTED

## 2023-02-20 NOTE — PROGRESS NOTES
Department of Internal Medicine  Nephrology Progress Note    SUBJECTIVE:  We are following this patient for KOLBY . Patient progress reviewed. The pt feels good   No labs this am .     REVIEW OF SYSTEMS:  No CP, SOB or KELLER     Physical Exam:    VITALS:  BP (!) 154/58   Pulse 72   Temp 98.4 °F (36.9 °C) (Oral)   Resp 14   Ht 5' (1.524 m)   Wt 136 lb 11 oz (62 kg)   SpO2 93%   BMI 26.69 kg/m²   24HR INTAKE/OUTPUT:    Intake/Output Summary (Last 24 hours) at 2/20/2023 1201  Last data filed at 2/20/2023 1140  Gross per 24 hour   Intake 1795 ml   Output 1250 ml   Net 545 ml       Constitutional:  Doing well  Respiratory:  CTA  Gastrointestinal:  No  tenderness.   Normal Bowel Sounds  Cardiovascular:  S1, S2 RRR   Edema:  +  edema    DATA:    CBC:  Lab Results   Component Value Date/Time    WBC 5.5 02/19/2023 04:44 AM    RBC 3.06 02/19/2023 04:44 AM    HGB 9.3 02/19/2023 04:44 AM    HCT 28.7 02/19/2023 04:44 AM    MCV 93.7 02/19/2023 04:44 AM    MCH 30.4 02/19/2023 04:44 AM    MCHC 32.4 02/19/2023 04:44 AM    RDW 16.1 02/19/2023 04:44 AM     02/19/2023 04:44 AM    MPV 7.4 02/19/2023 04:44 AM     CMP:  Lab Results   Component Value Date/Time     02/19/2023 04:44 AM    K 4.6 02/19/2023 04:44 AM    K 5.3 02/17/2023 10:47 AM    CL 97 02/19/2023 04:44 AM    CO2 22 02/19/2023 04:44 AM    BUN 36 02/19/2023 04:44 AM    CREATININE 2.7 02/19/2023 04:44 AM    GFRAA 46 10/09/2022 06:18 AM    GFRAA 60 05/23/2013 02:56 PM    AGRATIO 0.8 02/19/2023 04:44 AM    LABGLOM 19 02/19/2023 04:44 AM    GLUCOSE 219 02/19/2023 04:44 AM    PROT 7.0 02/19/2023 04:44 AM    PROT 8.1 02/15/2013 04:53 PM    CALCIUM 9.0 02/19/2023 04:44 AM    BILITOT 0.3 02/19/2023 04:44 AM    ALKPHOS 157 02/19/2023 04:44 AM    AST 15 02/19/2023 04:44 AM    ALT 9 02/19/2023 04:44 AM      Hepatic Function Panel:   Lab Results   Component Value Date/Time    ALKPHOS 157 02/19/2023 04:44 AM    ALT 9 02/19/2023 04:44 AM    AST 15 02/19/2023 04:44 AM    PROT 7.0 02/19/2023 04:44 AM    PROT 8.1 02/15/2013 04:53 PM    BILITOT 0.3 02/19/2023 04:44 AM    BILIDIR <0.2 08/22/2019 02:20 PM    IBILI see below 08/22/2019 02:20 PM      Phosphorus:   Lab Results   Component Value Date/Time    PHOS 5.0 02/19/2023 04:44 AM       ASSESSMENT:  Principal Problem:    Chest pain  Active Problems:    Acute kidney injury ([address]  Resolved Problems:    * No resolved hospital problems. *      PLAN:  KOLBY labs p. Check BMP now .    CKD 3a Follows up with Dr Jillian Garcia   HTN controlled   Chest pain  S/p cards eval , no further work up       Shivam Carr MD, [facility]

## 2023-02-20 NOTE — PLAN OF CARE
Problem: Discharge Planning  Goal: Discharge to home or other facility with appropriate resources  2/19/2023 2351 by Lamar Castro RN  Outcome: Progressing  2/19/2023 2349 by Lamar Castro RN  Outcome: Progressing     Problem: Pain  Goal: Verbalizes/displays adequate comfort level or baseline comfort level  2/19/2023 2351 by Lamar Castro RN  Outcome: Progressing  2/19/2023 2349 by Lamar Castro RN  Outcome: Progressing     Problem: Safety - Adult  Goal: Free from fall injury  2/19/2023 2351 by Lamar Castro RN  Outcome: Progressing  2/19/2023 2349 by Lamar Castro RN  Outcome: Progressing

## 2023-02-20 NOTE — PLAN OF CARE
Problem: Discharge Planning  Goal: Discharge to home or other facility with appropriate resources  2/19/2023 2352 by Del Glasgow RN  Outcome: Progressing  2/19/2023 2351 by Del Glasgow RN  Outcome: Progressing  2/19/2023 2349 by Del Glasgow RN  Outcome: Progressing     Problem: Pain  Goal: Verbalizes/displays adequate comfort level or baseline comfort level  2/19/2023 2352 by Del Glasgow RN  Outcome: Progressing  2/19/2023 2351 by Del Glasgow RN  Outcome: Progressing  2/19/2023 2349 by Del Glasgow RN  Outcome: Progressing     Problem: Safety - Adult  Goal: Free from fall injury  2/19/2023 2352 by Del Glasgow RN  Outcome: Progressing  2/19/2023 2351 by Del Glasgow RN  Outcome: Progressing  2/19/2023 2349 by Del Glasgow RN  Outcome: Progressing

## 2023-02-20 NOTE — PROGRESS NOTES
[facility] Progress Note     Date:  2/20/2023    PCP: Jo Ann Dozier MD (Tel: [phone])    Date of Admission: 2/17/2023    Chief complaint:   Chief Complaint   Patient presents with    Chest Pain     Onset around 0700; ems gave 324 ASA and 1 NTG w/ initial relief but pain has returned; middle and radiating to the left;        Brief admission history: 79-year-old female with history of anxiety and depression, gastroesophageal reflux disease, normocytic anemia, CKD stage III, COPD, paroxysmal atrial fibrillation (on anticoagulation), chronic metabolic acidosis, asthma, fibromyalgia, hyperlipidemia, essential hypertension, IBS, temporal arteritis, negative stress test in August 2022, who presents to the emergency room with complaints of substernal chest discomfort that woke the morning of 2/17/2023. She also reports associated lightheadedness. She has had some shortness of breath over the past week, mostly with exertion. In triage, she reports some improvement of chest discomfort with nitroglycerin,; however, at the time of my evaluation, she maintains that chest discomfort did not improve with nitroglycerin. Troponin is negative. EKG with no ischemia. Chest x-ray with trace fissural thickening concerning for possible pulmonary edema. She was admitted for further evaluation and management. Assessment/plan:  Chest pain. Initial EKG and troponin unremarkable. Patient with negative stress test in August 2022. No significant improvement with nitroglycerin. Continue antiplatelet therapy, beta-blocker, statin. Troponin was relatively flat from 0.02-0.03. She was evaluated by cardiologist, noted to have noncardiac chest pain. Hypertensive urgency, improved. Currently holding most of antihypertensive medications for now, as patient has developed acute kidney injury, likely from quick blood pressure control. Acute kidney injury on CKD stage III, not present on admission.   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 Holding most of antihypertensive medications for now. Fluid has been discontinued per nephrology recommendations. Labs currently pending today. Appreciate nephrology assistance. Mild hyperkalemia, potassium 5.3, resolved with IV Lasix. Other comorbidities: history of anxiety and depression, gastroesophageal reflux disease, normocytic anemia, CKD stage III, COPD, paroxysmal atrial fibrillation (on anticoagulation), chronic metabolic acidosis, asthma, fibromyalgia, hyperlipidemia, essential hypertension, IBS, temporal arteritis, negative stress test in August 2022. Disposition. Discharge date to be determined. Diet: ADULT DIET; Regular; 4 carb choices (60 gm/meal); Low Sodium (2 gm); No Caffeine    Code status: Full Code   ----------  Subjective  Still with occasional, intermittent chest pain. No shortness of breath. Objective  Physical exam:  Vitals: BP (!) 168/68 Comment: primary RN notified  Pulse 73   Temp 98.2 °F (36.8 °C) (Oral)   Resp 17   Ht 5' (1.524 m)   Wt 136 lb 11 oz (62 kg)   SpO2 95%   BMI 26.69 kg/m²   Gen/overall appearance: Not in acute distress. Alert. Oriented x3  Head: Normocephalic, atraumatic  Eyes: EOMI, good acuity  ENT: Oral mucosa moist  Neck: No JVD, thyromegaly  CVS: Nml S1S2, no MRG, RRR  Pulm: Clear bilaterally. No crackles/wheezes  Gastrointestinal: Soft, NT/ND, +BS  Musculoskeletal: No edema. Warm  Neuro: No focal deficit. Moves extremity spontaneously. Psychiatry: Appropriate affect. Not agitated. Skin: Warm, dry with normal turgor. No rash  Capillary refill: Brisk,< 3 seconds   Peripheral Pulses: +2 palpable, equal bilaterally      24HR INTAKE/OUTPUT:    Intake/Output Summary (Last 24 hours) at 2/20/2023 1103  Last data filed at 2/20/2023 0630  Gross per 24 hour   Intake 1795 ml   Output 550 ml   Net 1245 ml       I/O last 3 completed shifts: In: 2681 [P.O.:1140;  I.V.:1541]  Out: 850 [Urine:850]  No intake/output data recorded.   Meds:    heparin (porcine)  5,000 Units SubCUTAneous 3 times per day    [Held by provider] carvedilol  6.25 mg Oral BID WC    amitriptyline  30 mg Oral Nightly    [Held by provider] amLODIPine  10 mg Oral Daily    atorvastatin  80 mg Oral Nightly    buPROPion  150 mg Oral Daily    docusate sodium  100 mg Oral BID    DULoxetine  60 mg Oral Daily    cetirizine  10 mg Oral Daily    mometasone-formoterol  2 puff Inhalation BID    [Held by provider] furosemide  20 mg Oral Daily    [Held by provider] isosorbide mononitrate  60 mg Oral Daily    montelukast  10 mg Oral Daily    [Held by provider] pantoprazole  40 mg Oral QAM AC    ranolazine  1,000 mg Oral BID    insulin lispro  0-4 Units SubCUTAneous TID WC    insulin lispro  0-4 Units SubCUTAneous Nightly    sodium chloride flush  10 mL IntraVENous 2 times per day    aspirin  81 mg Oral Daily    insulin glargine  8 Units SubCUTAneous BID    tiZANidine  4 mg Oral BID    QUEtiapine  50 mg Oral Nightly    linaclotide  290 mcg Oral QAM AC     Infusions:    dextrose      sodium chloride       PRN Meds: labetalol, trimethobenzamide, ondansetron, promethazine, hydrOXYzine HCl, glucose, dextrose bolus **OR** dextrose bolus, glucagon (rDNA), dextrose, sodium chloride flush, sodium chloride, acetaminophen **OR** acetaminophen, polyethylene glycol, morphine    Labs/imaging:  CBC:   Recent Labs     02/18/23  0520 02/19/23  0444   WBC 4.6 5.5   HGB 10.1* 9.3*    209       BMP:    Recent Labs     02/18/23  0519 02/19/23  0444   * 133*   K 4.3 4.6    97*   CO2 20* 22   BUN 26* 36*   CREATININE 2.3* 2.7*   GLUCOSE 190* 219*       Hepatic:   Recent Labs     02/18/23  0519 02/19/23  0444   AST 22 15   ALT 10 9*   BILITOT 0.3 0.3   ALKPHOS 161* 157*         Garrett Avila MD  -------------------------------  Rounding hospitalist

## 2023-02-20 NOTE — PROGRESS NOTES
Fluid/NS infusion discontinued by nephrologist yesterday. However, I noticed it was still running in room this morning - I physically turned the pump off in room at 8:18 AM today.       SIGNED: Naz Coats MD, MPH. 2/20/2023

## 2023-02-21 LAB
ANION GAP SERPL CALCULATED.3IONS-SCNC: 15 MMOL/L (ref 3–16)
BUN BLDV-MCNC: 29 MG/DL (ref 7–20)
CALCIUM SERPL-MCNC: 9.4 MG/DL (ref 8.3–10.6)
CHLORIDE BLD-SCNC: 105 MMOL/L (ref 99–110)
CO2: 18 MMOL/L (ref 21–32)
CREAT SERPL-MCNC: 1.7 MG/DL (ref 0.6–1.2)
GFR SERPL CREATININE-BSD FRML MDRD: 33 ML/MIN/{1.73_M2}
GLUCOSE BLD-MCNC: 143 MG/DL (ref 70–99)
GLUCOSE BLD-MCNC: 159 MG/DL (ref 70–99)
GLUCOSE BLD-MCNC: 166 MG/DL (ref 70–99)
GLUCOSE BLD-MCNC: 200 MG/DL (ref 70–99)
GLUCOSE BLD-MCNC: 234 MG/DL (ref 70–99)
MAGNESIUM: 2 MG/DL (ref 1.8–2.4)
PERFORMED ON: ABNORMAL
POTASSIUM SERPL-SCNC: 4.9 MMOL/L (ref 3.5–5.1)
SODIUM BLD-SCNC: 138 MMOL/L (ref 136–145)

## 2023-02-21 PROCEDURE — 83735 ASSAY OF MAGNESIUM: CPT

## 2023-02-21 PROCEDURE — 80048 BASIC METABOLIC PNL TOTAL CA: CPT

## 2023-02-21 PROCEDURE — 1200000000 HC SEMI PRIVATE

## 2023-02-21 PROCEDURE — 36415 COLL VENOUS BLD VENIPUNCTURE: CPT

## 2023-02-21 PROCEDURE — 6370000000 HC RX 637 (ALT 250 FOR IP): Performed by: INTERNAL MEDICINE

## 2023-02-21 PROCEDURE — 2580000003 HC RX 258: Performed by: INTERNAL MEDICINE

## 2023-02-21 PROCEDURE — 6360000002 HC RX W HCPCS: Performed by: INTERNAL MEDICINE

## 2023-02-21 PROCEDURE — 94640 AIRWAY INHALATION TREATMENT: CPT

## 2023-02-21 PROCEDURE — 2500000003 HC RX 250 WO HCPCS: Performed by: INTERNAL MEDICINE

## 2023-02-21 PROCEDURE — 94760 N-INVAS EAR/PLS OXIMETRY 1: CPT

## 2023-02-21 RX ORDER — AMLODIPINE BESYLATE 5 MG/1
5 TABLET ORAL DAILY
Status: DISCONTINUED | OUTPATIENT
Start: 2023-02-21 | End: 2023-02-21

## 2023-02-21 RX ORDER — NIFEDIPINE 30 MG/1
30 TABLET, EXTENDED RELEASE ORAL DAILY
Status: DISCONTINUED | OUTPATIENT
Start: 2023-02-21 | End: 2023-02-22 | Stop reason: HOSPADM

## 2023-02-21 RX ORDER — SODIUM BICARBONATE 650 MG/1
1300 TABLET ORAL 4 TIMES DAILY
Status: DISCONTINUED | OUTPATIENT
Start: 2023-02-21 | End: 2023-02-22 | Stop reason: HOSPADM

## 2023-02-21 RX ADMIN — TIZANIDINE 4 MG: 4 TABLET ORAL at 20:49

## 2023-02-21 RX ADMIN — SODIUM BICARBONATE 1300 MG: 650 TABLET ORAL at 10:32

## 2023-02-21 RX ADMIN — BUPROPION HYDROCHLORIDE 150 MG: 150 TABLET, EXTENDED RELEASE ORAL at 08:27

## 2023-02-21 RX ADMIN — RANOLAZINE 1000 MG: 500 TABLET, FILM COATED, EXTENDED RELEASE ORAL at 20:49

## 2023-02-21 RX ADMIN — ACETAMINOPHEN 650 MG: 325 TABLET ORAL at 22:16

## 2023-02-21 RX ADMIN — INSULIN GLARGINE 8 UNITS: 100 INJECTION, SOLUTION SUBCUTANEOUS at 10:37

## 2023-02-21 RX ADMIN — DULOXETINE HYDROCHLORIDE 60 MG: 60 CAPSULE, DELAYED RELEASE ORAL at 08:27

## 2023-02-21 RX ADMIN — RANOLAZINE 1000 MG: 500 TABLET, FILM COATED, EXTENDED RELEASE ORAL at 08:27

## 2023-02-21 RX ADMIN — MORPHINE SULFATE 2 MG: 2 INJECTION, SOLUTION INTRAMUSCULAR; INTRAVENOUS at 15:33

## 2023-02-21 RX ADMIN — Medication 2 PUFF: at 08:28

## 2023-02-21 RX ADMIN — LABETALOL HYDROCHLORIDE 5 MG: 5 INJECTION INTRAVENOUS at 08:28

## 2023-02-21 RX ADMIN — INSULIN GLARGINE 8 UNITS: 100 INJECTION, SOLUTION SUBCUTANEOUS at 20:49

## 2023-02-21 RX ADMIN — CARVEDILOL 6.25 MG: 6.25 TABLET, FILM COATED ORAL at 16:55

## 2023-02-21 RX ADMIN — HEPARIN SODIUM 5000 UNITS: 5000 INJECTION INTRAVENOUS; SUBCUTANEOUS at 05:16

## 2023-02-21 RX ADMIN — ACETAMINOPHEN 650 MG: 325 TABLET ORAL at 05:16

## 2023-02-21 RX ADMIN — DOCUSATE SODIUM 100 MG: 100 CAPSULE, LIQUID FILLED ORAL at 08:27

## 2023-02-21 RX ADMIN — DOCUSATE SODIUM 100 MG: 100 CAPSULE, LIQUID FILLED ORAL at 20:49

## 2023-02-21 RX ADMIN — POLYETHYLENE GLYCOL 3350 17 G: 17 POWDER, FOR SOLUTION ORAL at 08:27

## 2023-02-21 RX ADMIN — NIFEDIPINE 30 MG: 30 TABLET, EXTENDED RELEASE ORAL at 12:58

## 2023-02-21 RX ADMIN — QUETIAPINE FUMARATE 50 MG: 25 TABLET ORAL at 20:49

## 2023-02-21 RX ADMIN — ATORVASTATIN CALCIUM 80 MG: 40 TABLET, FILM COATED ORAL at 20:49

## 2023-02-21 RX ADMIN — SODIUM BICARBONATE 1300 MG: 650 TABLET ORAL at 20:50

## 2023-02-21 RX ADMIN — SODIUM BICARBONATE 1300 MG: 650 TABLET ORAL at 16:55

## 2023-02-21 RX ADMIN — INSULIN LISPRO 1 UNITS: 100 INJECTION, SOLUTION INTRAVENOUS; SUBCUTANEOUS at 16:48

## 2023-02-21 RX ADMIN — MORPHINE SULFATE 2 MG: 2 INJECTION, SOLUTION INTRAMUSCULAR; INTRAVENOUS at 02:56

## 2023-02-21 RX ADMIN — MORPHINE SULFATE 2 MG: 2 INJECTION, SOLUTION INTRAMUSCULAR; INTRAVENOUS at 07:03

## 2023-02-21 RX ADMIN — AMLODIPINE BESYLATE 5 MG: 5 TABLET ORAL at 10:31

## 2023-02-21 RX ADMIN — HYDROXYZINE HYDROCHLORIDE 10 MG: 10 TABLET ORAL at 08:27

## 2023-02-21 RX ADMIN — HEPARIN SODIUM 5000 UNITS: 5000 INJECTION INTRAVENOUS; SUBCUTANEOUS at 12:59

## 2023-02-21 RX ADMIN — MONTELUKAST 10 MG: 10 TABLET, FILM COATED ORAL at 08:27

## 2023-02-21 RX ADMIN — HEPARIN SODIUM 5000 UNITS: 5000 INJECTION INTRAVENOUS; SUBCUTANEOUS at 20:49

## 2023-02-21 RX ADMIN — ASPIRIN 81 MG CHEWABLE TABLET 81 MG: 81 TABLET CHEWABLE at 08:27

## 2023-02-21 RX ADMIN — HYDROXYZINE HYDROCHLORIDE 10 MG: 10 TABLET ORAL at 22:16

## 2023-02-21 RX ADMIN — Medication 2 PUFF: at 20:22

## 2023-02-21 RX ADMIN — MORPHINE SULFATE 2 MG: 2 INJECTION, SOLUTION INTRAMUSCULAR; INTRAVENOUS at 19:59

## 2023-02-21 RX ADMIN — AMITRIPTYLINE HYDROCHLORIDE 30 MG: 10 TABLET, FILM COATED ORAL at 20:49

## 2023-02-21 RX ADMIN — SODIUM CHLORIDE, PRESERVATIVE FREE 10 ML: 5 INJECTION INTRAVENOUS at 19:59

## 2023-02-21 RX ADMIN — TIZANIDINE 4 MG: 4 TABLET ORAL at 08:27

## 2023-02-21 RX ADMIN — CETIRIZINE HYDROCHLORIDE 10 MG: 10 TABLET, FILM COATED ORAL at 08:27

## 2023-02-21 ASSESSMENT — PAIN DESCRIPTION - DESCRIPTORS
DESCRIPTORS: ACHING
DESCRIPTORS: DISCOMFORT
DESCRIPTORS: ACHING
DESCRIPTORS: ACHING

## 2023-02-21 ASSESSMENT — PAIN DESCRIPTION - LOCATION
LOCATION: CHEST
LOCATION: CHEST
LOCATION: HEAD
LOCATION: CHEST

## 2023-02-21 ASSESSMENT — PAIN DESCRIPTION - PAIN TYPE
TYPE: ACUTE PAIN

## 2023-02-21 ASSESSMENT — PAIN SCALES - GENERAL
PAINLEVEL_OUTOF10: 0
PAINLEVEL_OUTOF10: 8
PAINLEVEL_OUTOF10: 7
PAINLEVEL_OUTOF10: 8
PAINLEVEL_OUTOF10: 5
PAINLEVEL_OUTOF10: 9
PAINLEVEL_OUTOF10: 7
PAINLEVEL_OUTOF10: 0
PAINLEVEL_OUTOF10: 1
PAINLEVEL_OUTOF10: 0
PAINLEVEL_OUTOF10: 0
PAINLEVEL_OUTOF10: 10

## 2023-02-21 ASSESSMENT — PAIN DESCRIPTION - FREQUENCY
FREQUENCY: CONTINUOUS
FREQUENCY: CONTINUOUS
FREQUENCY: INTERMITTENT
FREQUENCY: CONTINUOUS
FREQUENCY: CONTINUOUS

## 2023-02-21 ASSESSMENT — PAIN DESCRIPTION - ONSET
ONSET: ON-GOING

## 2023-02-21 ASSESSMENT — PAIN - FUNCTIONAL ASSESSMENT
PAIN_FUNCTIONAL_ASSESSMENT: ACTIVITIES ARE NOT PREVENTED

## 2023-02-21 ASSESSMENT — PAIN DESCRIPTION - ORIENTATION
ORIENTATION: MID

## 2023-02-21 ASSESSMENT — PAIN SCALES - WONG BAKER: WONGBAKER_NUMERICALRESPONSE: 0

## 2023-02-21 NOTE — PROGRESS NOTES
Patient's BP elevated at start of shift at 202/77. PRN labetalol given, see MAR. Patient expressing concern to this RN that she is intermittently having difficulty finding her words and is worried because she has had a \"mini stroke\" in the past. RN assessed patient as a 0 on NIHSS. Notified Dotty Reid NP. No new orders at this time.

## 2023-02-21 NOTE — PROGRESS NOTES
Hospital Medicine Progress Note     Date:  2/21/2023    PCP: Jo Ann Dozier MD (Tel: [phone])    Date of Admission: 2/17/2023    Chief complaint:   Chief Complaint   Patient presents with    Chest Pain     Onset around 0700; ems gave 324 ASA and 1 NTG w/ initial relief but pain has returned; middle and radiating to the left;        Brief admission history: 76-year-old female with history of anxiety and depression, gastroesophageal reflux disease, normocytic anemia, CKD stage III, COPD, paroxysmal atrial fibrillation (on anticoagulation), chronic metabolic acidosis, asthma, fibromyalgia, hyperlipidemia, essential hypertension, IBS, temporal arteritis, negative stress test in August 2022, who presents to the emergency room with complaints of substernal chest discomfort that woke the morning of 2/17/2023. She also reports associated lightheadedness. She has had some shortness of breath over the past week, mostly with exertion. In triage, she reports some improvement of chest discomfort with nitroglycerin,; however, at the time of my evaluation, she maintains that chest discomfort did not improve with nitroglycerin. Troponin is negative. EKG with no ischemia. Chest x-ray with trace fissural thickening concerning for possible pulmonary edema. She was admitted for further evaluation and management. Assessment/plan:  Chest pain. Initial EKG and troponin unremarkable. Patient with negative stress test in August 2022. No significant improvement with nitroglycerin. Continue antiplatelet therapy, beta-blocker, statin. Troponin was relatively flat from 0.02-0.03. She was evaluated by cardiologist, noted to have noncardiac chest pain. Hypertensive urgency. She did have acute kidney injury, likely from quick blood pressure control. Restarted norvasc today. Check manual BP for elevated readings. As needed IV labetalol in place. Acute kidney injury on CKD stage III, not present on admission. 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 Will employ gradual blood pressure control. Creatinine starting to improve at this time. Mild hyperkalemia, resolved with IV Lasix. Other comorbidities: history of anxiety and depression, gastroesophageal reflux disease, normocytic anemia, CKD stage III, COPD, paroxysmal atrial fibrillation (on anticoagulation), chronic metabolic acidosis, asthma, fibromyalgia, hyperlipidemia, essential hypertension, IBS, temporal arteritis, negative stress test in August 2022. Disposition. Possible discharge in 1-2 days. Discussed with nursing staff. Diet: ADULT DIET; Regular; 4 carb choices (60 gm/meal); Low Sodium (2 gm); No Caffeine    Code status: Full Code   ----------  Subjective  No SOB. Denies any needs. Objective  Physical exam:  Vitals: BP (!) 207/75   Pulse 69   Temp 97.9 °F (36.6 °C) (Oral)   Resp 14   Ht 5' (1.524 m)   Wt 136 lb 11 oz (62 kg)   SpO2 94%   BMI 26.69 kg/m²   Gen/overall appearance: Not in acute distress. Alert. Oriented x3  Head: Normocephalic, atraumatic  Eyes: EOMI, good acuity  ENT: Oral mucosa moist  Neck: No JVD, thyromegaly  CVS: Nml S1S2, no MRG, RRR  Pulm: Clear bilaterally. No crackles/wheezes  Gastrointestinal: Soft, NT/ND, +BS  Musculoskeletal: No edema. Warm  Neuro: No focal deficit. Moves extremity spontaneously. Psychiatry: Appropriate affect. Not agitated. Skin: Warm, dry with normal turgor. No rash  Capillary refill: Brisk,< 3 seconds   Peripheral Pulses: +2 palpable, equal bilaterally      24HR INTAKE/OUTPUT:    Intake/Output Summary (Last 24 hours) at 2/21/2023 1225  Last data filed at 2/21/2023 1212  Gross per 24 hour   Intake 660 ml   Output 825 ml   Net -165 ml       I/O last 3 completed shifts: In: 2215 [P.O.:840;  I.V.:1375]  Out: 2534 [Urine:1575]  I/O this shift:  In: 240 [P.O.:240]  Out: 500 [Urine:500]  Meds:    sodium bicarbonate  1,300 mg Oral 4x Daily    amLODIPine  5 mg Oral Daily    heparin (porcine) 5,000 Units SubCUTAneous 3 times per day    [Held by provider] carvedilol  6.25 mg Oral BID WC    amitriptyline  30 mg Oral Nightly    atorvastatin  80 mg Oral Nightly    buPROPion  150 mg Oral Daily    docusate sodium  100 mg Oral BID    DULoxetine  60 mg Oral Daily    cetirizine  10 mg Oral Daily    mometasone-formoterol  2 puff Inhalation BID    [Held by provider] furosemide  20 mg Oral Daily    [Held by provider] isosorbide mononitrate  60 mg Oral Daily    montelukast  10 mg Oral Daily    [Held by provider] pantoprazole  40 mg Oral QAM AC    ranolazine  1,000 mg Oral BID    insulin lispro  0-4 Units SubCUTAneous TID WC    insulin lispro  0-4 Units SubCUTAneous Nightly    sodium chloride flush  10 mL IntraVENous 2 times per day    aspirin  81 mg Oral Daily    insulin glargine  8 Units SubCUTAneous BID    tiZANidine  4 mg Oral BID    QUEtiapine  50 mg Oral Nightly    linaclotide  290 mcg Oral QAM AC     Infusions:    dextrose      sodium chloride       PRN Meds: labetalol, trimethobenzamide, ondansetron, promethazine, hydrOXYzine HCl, glucose, dextrose bolus **OR** dextrose bolus, glucagon (rDNA), dextrose, sodium chloride flush, sodium chloride, acetaminophen **OR** acetaminophen, polyethylene glycol, morphine    Labs/imaging:  CBC:   Recent Labs     02/19/23  0444 02/20/23  1243   WBC 5.5 6.7   HGB 9.3* 9.0*    215       BMP:    Recent Labs     02/19/23  0444 02/20/23  1243 02/21/23  0719   * 135* 138   K 4.6 4.6 4.9   CL 97* 104 105   CO2 22 18* 18*   BUN 36* 36* 29*   CREATININE 2.7* 1.9* 1.7*   GLUCOSE 219* 167* 143*       Hepatic:   Recent Labs     02/19/23  0444   AST 15   ALT 9*   BILITOT 0.3   ALKPHOS 157*         Damaso Charles MD  -------------------------------  Rounding hospitalist

## 2023-02-21 NOTE — PROGRESS NOTES
Department of Internal Medicine  Nephrology Progress Note    SUBJECTIVE:  We are following this patient for KOLBY . Patient progress reviewed. The pt feels good     BP  noted. REVIEW OF SYSTEMS:  No CP, SOB or KELLER     Physical Exam:    VITALS:  BP (!) 188/60   Pulse 66   Temp 98.2 °F (36.8 °C) (Oral)   Resp 16   Ht 5' (1.524 m)   Wt 136 lb 11 oz (62 kg)   SpO2 92%   BMI 26.69 kg/m²   24HR INTAKE/OUTPUT:    Intake/Output Summary (Last 24 hours) at 2/21/2023 1042  Last data filed at 2/21/2023 0931  Gross per 24 hour   Intake 660 ml   Output 1025 ml   Net -365 ml         Constitutional:  Doing well  Respiratory:  CTA  Gastrointestinal:  No  tenderness.   Normal Bowel Sounds  Cardiovascular:  S1, S2 RRR   Edema:  +  edema    DATA:    CBC:  Lab Results   Component Value Date/Time    WBC 6.7 02/20/2023 12:43 PM    RBC 2.95 02/20/2023 12:43 PM    HGB 9.0 02/20/2023 12:43 PM    HCT 27.8 02/20/2023 12:43 PM    MCV 94.2 02/20/2023 12:43 PM    MCH 30.5 02/20/2023 12:43 PM    MCHC 32.4 02/20/2023 12:43 PM    RDW 15.7 02/20/2023 12:43 PM     02/20/2023 12:43 PM    MPV 7.7 02/20/2023 12:43 PM     CMP:  Lab Results   Component Value Date/Time     02/20/2023 12:43 PM    K 4.6 02/20/2023 12:43 PM    K 5.3 02/17/2023 10:47 AM     02/20/2023 12:43 PM    CO2 18 02/20/2023 12:43 PM    BUN 36 02/20/2023 12:43 PM    CREATININE 1.9 02/20/2023 12:43 PM    GFRAA 46 10/09/2022 06:18 AM    GFRAA 60 05/23/2013 02:56 PM    AGRATIO 0.8 02/19/2023 04:44 AM    LABGLOM 29 02/20/2023 12:43 PM    GLUCOSE 167 02/20/2023 12:43 PM    PROT 7.0 02/19/2023 04:44 AM    PROT 8.1 02/15/2013 04:53 PM    CALCIUM 8.8 02/20/2023 12:43 PM    BILITOT 0.3 02/19/2023 04:44 AM    ALKPHOS 157 02/19/2023 04:44 AM    AST 15 02/19/2023 04:44 AM    ALT 9 02/19/2023 04:44 AM      Hepatic Function Panel:   Lab Results   Component Value Date/Time    ALKPHOS 157 02/19/2023 04:44 AM    ALT 9 02/19/2023 04:44 AM    AST 15 02/19/2023 04:44 AM    PROT 7.0 02/19/2023 04:44 AM    PROT 8.1 02/15/2013 04:53 PM    BILITOT 0.3 02/19/2023 04:44 AM    BILIDIR <0.2 08/22/2019 02:20 PM    IBILI see below 08/22/2019 02:20 PM      Phosphorus:   Lab Results   Component Value Date/Time    PHOS 5.0 02/19/2023 04:44 AM       ASSESSMENT:  Principal Problem:    Chest pain  Active Problems:    Acute kidney injury ([facility] [address]  Resolved Problems:    * No resolved hospital problems. *      PLAN:  KOLBY Cr better , labs p from today . CKD 3a Follows up with Dr Rianna Leal   HTN uncontrolled ,meds adjusted .    Chest pain  S/p cards eval , no further work up       Myranda Gilbert MD, Yimi Saenz

## 2023-02-21 NOTE — CARE COORDINATION
[facility] care manager - Genny Arciniega  ph:  [phone]    Patricia Willoughby Sr. Administrative Assist, Case Management  [phone]  Electronically signed by Patricia Willoughby on 2/21/2023 at 9:52 AM

## 2023-02-21 NOTE — CARE COORDINATION
02/21/23 1134   IMM Letter   IMM Letter given to Patient/Family/Significant other/Guardian/POA/by: Provided to patient at bedside by Yuliya Brock RN. Education provided to patient, patient reported no questions and verbalized understanding. Patient aware of 4 hours allotted time to determine if they choose to pursue Medicare appeal process. Copy left with patient at bedside. No further questions or concerns.    IMM Letter date given: 02/21/23   IMM Letter time given: [phone]  Electronically signed by Yuliya Brock RN on 2/21/2023 at 11:34 AM

## 2023-02-22 ENCOUNTER — APPOINTMENT (OUTPATIENT)
Dept: CT IMAGING | Age: 68
DRG: 305 | End: 2023-02-22
Payer: COMMERCIAL

## 2023-02-22 VITALS
HEART RATE: 78 BPM | SYSTOLIC BLOOD PRESSURE: 150 MMHG | OXYGEN SATURATION: 92 % | RESPIRATION RATE: 13 BRPM | HEIGHT: 60 IN | DIASTOLIC BLOOD PRESSURE: 69 MMHG | WEIGHT: 139.99 LBS | BODY MASS INDEX: 27.48 KG/M2 | TEMPERATURE: 98.4 F

## 2023-02-22 LAB
A/G RATIO: 0.8 (ref 1.1–2.2)
ALBUMIN SERPL-MCNC: 3.3 G/DL (ref 3.4–5)
ALP BLD-CCNC: 143 U/L (ref 40–129)
ALT SERPL-CCNC: 8 U/L (ref 10–40)
ANION GAP SERPL CALCULATED.3IONS-SCNC: 12 MMOL/L (ref 3–16)
AST SERPL-CCNC: 13 U/L (ref 15–37)
BASOPHILS ABSOLUTE: 0.1 K/UL (ref 0–0.2)
BASOPHILS RELATIVE PERCENT: 1 %
BILIRUB SERPL-MCNC: 0.3 MG/DL (ref 0–1)
BUN BLDV-MCNC: 32 MG/DL (ref 7–20)
CALCIUM SERPL-MCNC: 9.5 MG/DL (ref 8.3–10.6)
CHLORIDE BLD-SCNC: 101 MMOL/L (ref 99–110)
CO2: 23 MMOL/L (ref 21–32)
CREAT SERPL-MCNC: 1.5 MG/DL (ref 0.6–1.2)
EOSINOPHILS ABSOLUTE: 0.3 K/UL (ref 0–0.6)
EOSINOPHILS RELATIVE PERCENT: 5.9 %
GFR SERPL CREATININE-BSD FRML MDRD: 38 ML/MIN/{1.73_M2}
GLUCOSE BLD-MCNC: 125 MG/DL (ref 70–99)
GLUCOSE BLD-MCNC: 140 MG/DL (ref 70–99)
GLUCOSE BLD-MCNC: 147 MG/DL (ref 70–99)
GLUCOSE BLD-MCNC: 160 MG/DL (ref 70–99)
HCT VFR BLD CALC: 28.4 % (ref 36–48)
HEMOGLOBIN: 9.3 G/DL (ref 12–16)
LYMPHOCYTES ABSOLUTE: 1.3 K/UL (ref 1–5.1)
LYMPHOCYTES RELATIVE PERCENT: 22.4 %
MAGNESIUM: 1.9 MG/DL (ref 1.8–2.4)
MCH RBC QN AUTO: 30.7 PG (ref 26–34)
MCHC RBC AUTO-ENTMCNC: 32.8 G/DL (ref 31–36)
MCV RBC AUTO: 93.6 FL (ref 80–100)
MONOCYTES ABSOLUTE: 0.4 K/UL (ref 0–1.3)
MONOCYTES RELATIVE PERCENT: 6.7 %
NEUTROPHILS ABSOLUTE: 3.7 K/UL (ref 1.7–7.7)
NEUTROPHILS RELATIVE PERCENT: 64 %
PDW BLD-RTO: 16.1 % (ref 12.4–15.4)
PERFORMED ON: ABNORMAL
PHOSPHORUS: 3.3 MG/DL (ref 2.5–4.9)
PLATELET # BLD: 212 K/UL (ref 135–450)
PMV BLD AUTO: 8.5 FL (ref 5–10.5)
POTASSIUM SERPL-SCNC: 5 MMOL/L (ref 3.5–5.1)
RBC # BLD: 3.03 M/UL (ref 4–5.2)
SODIUM BLD-SCNC: 136 MMOL/L (ref 136–145)
TOTAL PROTEIN: 7.2 G/DL (ref 6.4–8.2)
WBC # BLD: 5.9 K/UL (ref 4–11)

## 2023-02-22 PROCEDURE — 6370000000 HC RX 637 (ALT 250 FOR IP): Performed by: INTERNAL MEDICINE

## 2023-02-22 PROCEDURE — 85025 COMPLETE CBC W/AUTO DIFF WBC: CPT

## 2023-02-22 PROCEDURE — 84100 ASSAY OF PHOSPHORUS: CPT

## 2023-02-22 PROCEDURE — 70450 CT HEAD/BRAIN W/O DYE: CPT

## 2023-02-22 PROCEDURE — 80053 COMPREHEN METABOLIC PANEL: CPT

## 2023-02-22 PROCEDURE — 6360000002 HC RX W HCPCS: Performed by: INTERNAL MEDICINE

## 2023-02-22 PROCEDURE — 94640 AIRWAY INHALATION TREATMENT: CPT

## 2023-02-22 PROCEDURE — 94760 N-INVAS EAR/PLS OXIMETRY 1: CPT

## 2023-02-22 PROCEDURE — 36415 COLL VENOUS BLD VENIPUNCTURE: CPT

## 2023-02-22 PROCEDURE — 2580000003 HC RX 258: Performed by: INTERNAL MEDICINE

## 2023-02-22 PROCEDURE — 83735 ASSAY OF MAGNESIUM: CPT

## 2023-02-22 RX ORDER — NIFEDIPINE 30 MG/1
30 TABLET, EXTENDED RELEASE ORAL DAILY
Qty: 30 TABLET | Refills: 3 | Status: SHIPPED | OUTPATIENT
Start: 2023-02-23

## 2023-02-22 RX ORDER — CARVEDILOL 6.25 MG/1
6.25 TABLET ORAL 2 TIMES DAILY WITH MEALS
Qty: 60 TABLET | Refills: 3 | Status: SHIPPED | OUTPATIENT
Start: 2023-02-22

## 2023-02-22 RX ADMIN — CARVEDILOL 6.25 MG: 6.25 TABLET, FILM COATED ORAL at 09:03

## 2023-02-22 RX ADMIN — HEPARIN SODIUM 5000 UNITS: 5000 INJECTION INTRAVENOUS; SUBCUTANEOUS at 13:29

## 2023-02-22 RX ADMIN — SODIUM BICARBONATE 1300 MG: 650 TABLET ORAL at 16:24

## 2023-02-22 RX ADMIN — HEPARIN SODIUM 5000 UNITS: 5000 INJECTION INTRAVENOUS; SUBCUTANEOUS at 05:38

## 2023-02-22 RX ADMIN — TIZANIDINE 4 MG: 4 TABLET ORAL at 09:02

## 2023-02-22 RX ADMIN — SODIUM BICARBONATE 1300 MG: 650 TABLET ORAL at 13:29

## 2023-02-22 RX ADMIN — INSULIN GLARGINE 8 UNITS: 100 INJECTION, SOLUTION SUBCUTANEOUS at 08:59

## 2023-02-22 RX ADMIN — MONTELUKAST 10 MG: 10 TABLET, FILM COATED ORAL at 09:03

## 2023-02-22 RX ADMIN — CARVEDILOL 6.25 MG: 6.25 TABLET, FILM COATED ORAL at 16:23

## 2023-02-22 RX ADMIN — SODIUM BICARBONATE 1300 MG: 650 TABLET ORAL at 09:02

## 2023-02-22 RX ADMIN — ASPIRIN 81 MG CHEWABLE TABLET 81 MG: 81 TABLET CHEWABLE at 09:02

## 2023-02-22 RX ADMIN — CETIRIZINE HYDROCHLORIDE 10 MG: 10 TABLET, FILM COATED ORAL at 09:02

## 2023-02-22 RX ADMIN — NIFEDIPINE 30 MG: 30 TABLET, EXTENDED RELEASE ORAL at 09:03

## 2023-02-22 RX ADMIN — ACETAMINOPHEN 650 MG: 325 TABLET ORAL at 15:03

## 2023-02-22 RX ADMIN — SODIUM CHLORIDE, PRESERVATIVE FREE 10 ML: 5 INJECTION INTRAVENOUS at 09:03

## 2023-02-22 RX ADMIN — DOCUSATE SODIUM 100 MG: 100 CAPSULE, LIQUID FILLED ORAL at 09:02

## 2023-02-22 RX ADMIN — BUPROPION HYDROCHLORIDE 150 MG: 150 TABLET, EXTENDED RELEASE ORAL at 09:02

## 2023-02-22 RX ADMIN — RANOLAZINE 1000 MG: 500 TABLET, FILM COATED, EXTENDED RELEASE ORAL at 09:02

## 2023-02-22 RX ADMIN — DULOXETINE HYDROCHLORIDE 60 MG: 60 CAPSULE, DELAYED RELEASE ORAL at 09:02

## 2023-02-22 RX ADMIN — MORPHINE SULFATE 2 MG: 2 INJECTION, SOLUTION INTRAMUSCULAR; INTRAVENOUS at 04:35

## 2023-02-22 RX ADMIN — Medication 2 PUFF: at 08:43

## 2023-02-22 RX ADMIN — MORPHINE SULFATE 2 MG: 2 INJECTION, SOLUTION INTRAMUSCULAR; INTRAVENOUS at 09:03

## 2023-02-22 ASSESSMENT — PAIN DESCRIPTION - DESCRIPTORS
DESCRIPTORS: ACHING

## 2023-02-22 ASSESSMENT — PAIN DESCRIPTION - ORIENTATION
ORIENTATION: MID

## 2023-02-22 ASSESSMENT — PAIN - FUNCTIONAL ASSESSMENT
PAIN_FUNCTIONAL_ASSESSMENT: ACTIVITIES ARE NOT PREVENTED

## 2023-02-22 ASSESSMENT — PAIN DESCRIPTION - LOCATION
LOCATION: HEAD
LOCATION: CHEST
LOCATION: CHEST;HEAD
LOCATION: CHEST;HEAD

## 2023-02-22 ASSESSMENT — PAIN DESCRIPTION - ONSET
ONSET: ON-GOING
ONSET: ON-GOING

## 2023-02-22 ASSESSMENT — PAIN SCALES - GENERAL
PAINLEVEL_OUTOF10: 0
PAINLEVEL_OUTOF10: 7
PAINLEVEL_OUTOF10: 4
PAINLEVEL_OUTOF10: 6
PAINLEVEL_OUTOF10: 6
PAINLEVEL_OUTOF10: 0
PAINLEVEL_OUTOF10: 7
PAINLEVEL_OUTOF10: 0

## 2023-02-22 ASSESSMENT — PAIN DESCRIPTION - PAIN TYPE
TYPE: CHRONIC PAIN;OTHER (COMMENT)
TYPE: ACUTE PAIN

## 2023-02-22 ASSESSMENT — PAIN DESCRIPTION - FREQUENCY
FREQUENCY: CONTINUOUS
FREQUENCY: CONTINUOUS

## 2023-02-22 NOTE — PROGRESS NOTES
Department of Internal Medicine  Nephrology Progress Note    SUBJECTIVE:  We are following this patient for KOLBY /HTN . Patient progress reviewed. The pt feels good     BP  noted. Labs reviewed . REVIEW OF SYSTEMS:  No CP, SOB or KELLER     Physical Exam:    VITALS:  BP (!) 184/73   Pulse 78   Temp 98.4 °F (36.9 °C) (Oral)   Resp 13   Ht 5' (1.524 m)   Wt 139 lb 15.9 oz (63.5 kg)   SpO2 92%   BMI 27.34 kg/m²   24HR INTAKE/OUTPUT:    Intake/Output Summary (Last 24 hours) at 2/22/2023 1127  Last data filed at 2/21/2023 2230  Gross per 24 hour   Intake 1080 ml   Output 1175 ml   Net -95 ml         Constitutional:  Doing well  Respiratory:  CTA  Gastrointestinal:  No  tenderness.   Normal Bowel Sounds  Cardiovascular:  S1, S2 RRR   Edema:  +  edema    DATA:    CBC:  Lab Results   Component Value Date/Time    WBC 5.9 02/22/2023 06:37 AM    RBC 3.03 02/22/2023 06:37 AM    HGB 9.3 02/22/2023 06:37 AM    HCT 28.4 02/22/2023 06:37 AM    MCV 93.6 02/22/2023 06:37 AM    MCH 30.7 02/22/2023 06:37 AM    MCHC 32.8 02/22/2023 06:37 AM    RDW 16.1 02/22/2023 06:37 AM     02/22/2023 06:37 AM    MPV 8.5 02/22/2023 06:37 AM     CMP:  Lab Results   Component Value Date/Time     02/22/2023 06:37 AM    K 5.0 02/22/2023 06:37 AM    K 5.3 02/17/2023 10:47 AM     02/22/2023 06:37 AM    CO2 23 02/22/2023 06:37 AM    BUN 32 02/22/2023 06:37 AM    CREATININE 1.5 02/22/2023 06:37 AM    GFRAA 46 10/09/2022 06:18 AM    GFRAA 60 05/23/2013 02:56 PM    AGRATIO 0.8 02/22/2023 06:37 AM    LABGLOM 38 02/22/2023 06:37 AM    GLUCOSE 147 02/22/2023 06:37 AM    PROT 7.2 02/22/2023 06:37 AM    PROT 8.1 02/15/2013 04:53 PM    CALCIUM 9.5 02/22/2023 06:37 AM    BILITOT 0.3 02/22/2023 06:37 AM    ALKPHOS 143 02/22/2023 06:37 AM    AST 13 02/22/2023 06:37 AM    ALT 8 02/22/2023 06:37 AM      Hepatic Function Panel:   Lab Results   Component Value Date/Time    ALKPHOS 143 02/22/2023 06:37 AM    ALT 8 02/22/2023 06:37 AM    AST 13 02/22/2023 06:37 AM    PROT 7.2 02/22/2023 06:37 AM    PROT 8.1 02/15/2013 04:53 PM    BILITOT 0.3 02/22/2023 06:37 AM    BILIDIR <0.2 08/22/2019 02:20 PM    IBILI see below 08/22/2019 02:20 PM      Phosphorus:   Lab Results   Component Value Date/Time    PHOS 3.3 02/22/2023 06:37 AM       ASSESSMENT:  Principal Problem:    Chest pain  Active Problems:    Acute kidney injury ([address]  Resolved Problems:    * No resolved hospital problems. *      PLAN:  KOLBY Cr better . CKD 3a Follows up with Dr Bruno Bal   HTN noted .  Will follow   Chest pain  S/p cards eval , no further work up       Franco Kraus MD, Micky Alash

## 2023-02-22 NOTE — PLAN OF CARE
Problem: Discharge Planning  Goal: Discharge to home or other facility with appropriate resources  2/22/2023 1143 by Lalo Knutson RN  Outcome: Progressing  2/21/2023 2238 by Harriet Adam RN  Outcome: Progressing     Problem: Pain  Goal: Verbalizes/displays adequate comfort level or baseline comfort level  2/22/2023 1143 by Lalo Knutson RN  Outcome: Progressing  2/21/2023 2238 by Harriet Adam RN  Outcome: Progressing     Problem: Safety - Adult  Goal: Free from fall injury  2/22/2023 1143 by Lalo Knutson RN  Outcome: Progressing  2/21/2023 2238 by Harriet Adam RN  Outcome: Progressing

## 2023-02-22 NOTE — DISCHARGE SUMMARY
Pt has been discharged. All medications have been explained to Pt. Meds delivered to room.  Ride will be here around 5pm.

## 2023-02-22 NOTE — CARE COORDINATION
Case Management Discharge Note          Date / Time of Note: 2/22/2023 3:45 PM                  Patient Name: Kim Koehler   YOB: 1955  Diagnosis: Chest pain [R07.9]  Chest pain, unspecified type [R07.9]  Acute kidney injury [facility]) [N17.9]   Date / Time: 2/17/2023  9:45 AM    Financial:  Payor: Leopoldo Patel / Plan: Desirae Urias / Product Type: *No Product type* /      Pharmacy:    [facility]  [address], [facility]. [phone] - F [phone]  [address]  [address] [address]  Phone: [phone] Fax: [phone]    [address], [place] [address] [facility] [address] [address] [address]  Phone: [phone] Fax: [phone] [address] [facility] [address], [address] [facility] [phone] Bandar Webster [phone]  [address] [facility] [address]  Phone: [phone] Fax: [phone]      Assistance purchasing medications?:    Assistance provided by Case Management: None at this time    DISCHARGE Disposition: Home- No Services Needed    Transportation:  Transportation PLAN for discharge:  LYFT    Mode of Transport: Private Car  Time of Transport: 5 PM    IMM Completed:   Yes; 2/21/23    Additional CM Notes:   Discharge order noted. Spoke to patient at bedside. Patient will need a LYFT home. LYFT scheduled for 5 PM.  Denied any other needs. The Plan for Transition of Care is related to the following treatment goals of Chest pain [R07.9]  Chest pain, unspecified type [R07.9]  Acute kidney injury ([facility] [N17.9]    The Patient and/or patient representative Maren and her family were provided with a choice of provider and agrees with the discharge plan Not Indicated    Freedom of choice list was provided with basic dialogue that supports the patient's individualized plan of care/goals and shares the quality data associated with the providers.  Not Indicated    Mary Moya RN  [address] [address]   Case Management Department  Ph: [phone]

## 2023-02-22 NOTE — DISCHARGE SUMMARY
Hospital Discharge Summary    Patient's PCP: Rojas Heredia MD  Admit Date: 2/17/2023   Discharge Date: 2/22/2023    Admitting Physician: Dr. Jurgen Lemus MD  Discharge Physician: Dr. Jurgen Lemus MD     Consults:   IP CONSULT TO HOSPITALIST  IP CONSULT TO CARDIOLOGY  IP CONSULT TO NEPHROLOGY    Brief HPI: Patient admitted with chest pain. Brief hospital course:  71-year-old female with history of anxiety and depression, gastroesophageal reflux disease, normocytic anemia, CKD stage III, COPD, paroxysmal atrial fibrillation (on anticoagulation), chronic metabolic acidosis, asthma, fibromyalgia, hyperlipidemia, essential hypertension, IBS, temporal arteritis, negative stress test in August 2022, who presents to the emergency room with complaints of substernal chest discomfort that woke the morning of 2/17/2023. She also reports associated lightheadedness. She has had some shortness of breath over the past week, mostly with exertion. In triage, she reports some improvement of chest discomfort with nitroglycerin,; however, at the time of my evaluation, she maintains that chest discomfort did not improve with nitroglycerin. Troponin is negative. EKG with no ischemia. Chest x-ray with trace fissural thickening concerning for possible pulmonary edema. She was admitted for further evaluation and management. Assessment/plan:  Chest pain. Initial EKG and troponin unremarkable. Patient with negative stress test in August 2022. No significant improvement with nitroglycerin. Continue antiplatelet therapy, beta-blocker, statin. Troponin was relatively flat from 0.02-0.03. She was evaluated by cardiologist, noted to have noncardiac chest pain. Hypertensive urgency. She did have acute kidney injury, likely from quick blood pressure control. Continue current antihypertensive medications, with plan to follow-up with primary care physician for close monitoring within 1 week.    Acute kidney injury on CKD stage III, not present on admission. 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 Overall, creatinine has improved and back to baseline. Mild hyperkalemia, resolved with IV Lasix. Other comorbidities: history of anxiety and depression, gastroesophageal reflux disease, normocytic anemia, CKD stage III, COPD, paroxysmal atrial fibrillation (on anticoagulation), chronic metabolic acidosis, asthma, fibromyalgia, hyperlipidemia, essential hypertension, IBS, temporal arteritis, negative stress test in August 2022. Disposition. Stable for discharge home. Invasive procedures:  None. Discharge Diagnoses:   See above. Physical Exam: BP (!) 150/69   Pulse 78   Temp 98.4 °F (36.9 °C) (Oral)   Resp 13   Ht 5' (1.524 m)   Wt 139 lb 15.9 oz (63.5 kg)   SpO2 92%   BMI 27.34 kg/m²   Gen/overall appearance: Not in acute distress. Alert. Oriented X3  Head: Normocephalic, atraumatic  Eyes: EOMI, good acuity  ENT: Oral mucosa moist  Neck: No JVD, thyromegaly  CVS: Nml S1S2, no MRG, RRR  Pulm: Clear bilaterally. No crackles/wheezes  Gastrointestinal: Soft, NT/ND, +BS  Musculoskeletal: No edema. Warm  Neuro: No focal deficit. Moves extremity spontaneously. Psychiatry: Appropriate affect. Not agitated. Skin: Warm, dry with normal turgor. No rash  Capillary refill: Brisk,< 3 seconds   Peripheral Pulses: +2 palpable, equal bilaterally     Significant diagnostic studies that may require follow up:  CT HEAD WO CONTRAST    Result Date: 2/22/2023  EXAMINATION: CT OF THE HEAD WITHOUT CONTRAST  2/22/2023 2:35 pm TECHNIQUE: CT of the head was performed without the administration of intravenous contrast. Automated exposure control, iterative reconstruction, and/or weight based adjustment of the mA/kV was utilized to reduce the radiation dose to as low as reasonably achievable. COMPARISON: 11/26/2021 HISTORY: ORDERING SYSTEM PROVIDED HISTORY: slurred speech, per patient.  TECHNOLOGIST PROVIDED HISTORY: Reason for exam:->slurred speech, per patient. Has a \"code stroke\" or \"stroke alert\" been called? ->No Reason for Exam: ha and slurred speech FINDINGS: BRAIN/VENTRICLES: There is no acute infarct or acute intracranial hemorrhage present. There is no mass effect or midline shift present. There is no ventriculomegaly or abnormal extra-axial fluid collection present. ORBITS: Limited evaluation of the orbits is unremarkable. SINUSES: The paranasal sinuses and mastoid air cells are clear. SOFT TISSUES/SKULL:  No lytic or blastic osseous lesions are identified. No acute intracranial process identified. XR CHEST PORTABLE    Result Date: 2/17/2023  EXAMINATION: ONE XRAY VIEW OF THE CHEST 2/17/2023 10:31 am COMPARISON: 11/14/2022 HISTORY: ORDERING SYSTEM PROVIDED HISTORY: chest pain TECHNOLOGIST PROVIDED HISTORY: Reason for exam:->chest pain FINDINGS: Heart size is normal.  Mediastinal contours are normal.  Pulmonary vascularity is normal.  No focal lung consolidation noted. There is mild eventration of the right hemidiaphragm Subtle thickening minor fissure on the right is seen. Spurring is seen in the spine. Spurring is seen in the shoulder joints. There is likely underlying mild emphysema. .  There is evidence of calcific rotator cuff tendinopathy on the left     Trace fissural thickening on the right. This can occasionally be a sign of mild fluid overload. Otherwise unremarkable study       Treatments: As above. Discharge Medications:     Medication List        START taking these medications      carvedilol 6.25 MG tablet  Commonly known as: COREG  Take 1 tablet by mouth 2 times daily (with meals)     NIFEdipine 30 MG extended release tablet  Commonly known as: PROCARDIA XL  Take 1 tablet by mouth daily  Start taking on: February 23, 2023            CHANGE how you take these medications      clotrimazole-betamethasone 1-0.05 % cream  Commonly known as: Lotrisone  Apply topically 2 times daily.   What changed: when to take this  reasons to take this            CONTINUE taking these medications      acetaminophen 500 MG tablet  Commonly known as: TYLENOL  Take 1 tablet by mouth 4 times daily as needed for Pain     amitriptyline 10 MG tablet  Commonly known as: ELAVIL     Aspirin Low Dose 81 MG EC tablet  Generic drug: aspirin  TAKE 1 TABLET BY MOUTH DAILY     atorvastatin 80 MG tablet  Commonly known as: LIPITOR  TAKE 1 TABLET BY MOUTH NIGHTLY     Basaglar KwikPen 100 UNIT/ML injection pen  Generic drug: insulin glargine  Inject 8 Units into the skin 2 times daily     buPROPion 150 MG extended release tablet  Commonly known as: WELLBUTRIN SR     docusate sodium 100 MG capsule  Commonly known as: COLACE     DULoxetine 60 MG extended release capsule  Commonly known as: CYMBALTA     fexofenadine 180 MG tablet  Commonly known as: ALLEGRA     fluticasone-salmeterol 500-50 MCG/ACT Aepb diskus inhaler  Commonly known as: ADVAIR  Inhale 1 puff into the lungs in the morning and 1 puff in the evening. furosemide 20 MG tablet  Commonly known as: LASIX  TAKE 1 TABLET BY MOUTH DAILY     hydrOXYzine HCl 25 MG tablet  Commonly known as: ATARAX     Linzess 290 MCG Caps capsule  Generic drug: linaclotide     lubiprostone 24 MCG capsule  Commonly known as: AMITIZA     montelukast 10 MG tablet  Commonly known as: SINGULAIR  TAKE 1 TABLET BY MOUTH DAILY     nitroGLYCERIN 0.4 MG SL tablet  Commonly known as: NITROSTAT  Place 1 tablet under the tongue every 5 minutes as needed for Chest pain up to max of 3 total doses. If no relief after 1 dose, call 911.      omeprazole 20 MG delayed release capsule  Commonly known as: PRILOSEC  TAKE 1 CAPSULE BY MOUTH DAILY     ondansetron 4 MG disintegrating tablet  Commonly known as: ZOFRAN-ODT  TAKE 1 TABLET BY MOUTH 3 TIMES DAILY AS NEEDED FOR NAUSEA OR VOMITING     ranolazine 1000 MG extended release tablet  Commonly known as: Ranexa  Take 1 tablet by mouth 2 times daily     tiZANidine 4 MG tablet  Commonly known as: ZANAFLEX     * Ventolin  (90 Base) MCG/ACT inhaler  Generic drug: albuterol sulfate HFA     * albuterol (2.5 MG/3ML) 0.083% nebulizer solution  Commonly known as: PROVENTIL           * This list has 2 medication(s) that are the same as other medications prescribed for you. Read the directions carefully, and ask your doctor or other care provider to review them with you. STOP taking these medications      amLODIPine 10 MG tablet  Commonly known as: NORVASC     isosorbide mononitrate 60 MG extended release tablet  Commonly known as: IMDUR     labetalol 200 MG tablet  Commonly known as: Jessica Collinoze your doctor about these medications      calcitRIOL 0.25 MCG capsule  Commonly known as: ROCALTROL     diclofenac sodium 1 % Gel  Commonly known as: VOLTAREN  Apply 4 g topically 4 times daily     ferrous sulfate 325 (65 Fe) MG tablet  Commonly known as: IRON 325     QUEtiapine 25 MG tablet  Commonly known as: SEROQUEL  Take 1-2 tablets by mouth nightly               Where to Get Your Medications        These medications were sent to [address] [place] [address] & [address]      Phone: [phone]   carvedilol 6.25 MG tablet  NIFEdipine 30 MG extended release tablet         Activity: activity as tolerated  Diet: ADULT DIET; Regular; 4 carb choices (60 gm/meal); Low Sodium (2 gm);  No Caffeine      Disposition: home  Discharged Condition: Stable  Follow Up:   Ivis Muñiz, [address] [address]  [phone]    Schedule an appointment as soon as possible for a visit in 1 week(s)      [address], [address] [facility]  [phone]    Schedule an appointment as soon as possible for a visit in 1 week(s)      Marcell Hutchison MD  [address]  [facility] [address] [id number]  [phone]    Schedule an appointment as soon as possible for a visit in 1 week(s)        Code status:  Full Code     Total time spent on discharge, finalizing medications, referrals and arranging outpatient follow up was more than 30 minutes      Thank you Dr. Zoila Palomares MD for the opportunity to be involved in this patients care.

## 2023-02-22 NOTE — PROGRESS NOTES
Pt was concerned about her speech. Pt thinks her speech has declined and feels she is more delayed with thinking clearly. Pt is asking if her new bp meds could cause this. States she had a previous stroke with right sided deficit. Education given on Morphine as well that could be causing cognitive delays. Current bp is 150/69. MD notified. N.O. D/C morphine. STAT CT of head. Pt made aware.

## 2023-02-22 NOTE — PROGRESS NOTES
Hospital Medicine Progress Note     Date:  2/22/2023    PCP: Maria Luz Fontanez MD (Tel: [phone])    Date of Admission: 2/17/2023    Chief complaint:   Chief Complaint   Patient presents with    Chest Pain     Onset around 0700; ems gave 324 ASA and 1 NTG w/ initial relief but pain has returned; middle and radiating to the left;        Brief admission history: 76-year-old female with history of anxiety and depression, gastroesophageal reflux disease, normocytic anemia, CKD stage III, COPD, paroxysmal atrial fibrillation (on anticoagulation), chronic metabolic acidosis, asthma, fibromyalgia, hyperlipidemia, essential hypertension, IBS, temporal arteritis, negative stress test in August 2022, who presents to the emergency room with complaints of substernal chest discomfort that woke the morning of 2/17/2023. She also reports associated lightheadedness. She has had some shortness of breath over the past week, mostly with exertion. In triage, she reports some improvement of chest discomfort with nitroglycerin,; however, at the time of my evaluation, she maintains that chest discomfort did not improve with nitroglycerin. Troponin is negative. EKG with no ischemia. Chest x-ray with trace fissural thickening concerning for possible pulmonary edema. She was admitted for further evaluation and management. Assessment/plan:  Chest pain. Initial EKG and troponin unremarkable. Patient with negative stress test in August 2022. No significant improvement with nitroglycerin. Continue antiplatelet therapy, beta-blocker, statin. Troponin was relatively flat from 0.02-0.03. She was evaluated by cardiologist, noted to have noncardiac chest pain. Hypertensive urgency. She did have acute kidney injury, likely from quick blood pressure control. Continue current antihypertensive medications, with plan to follow-up with primary care physician for close monitoring within 1 week.    Acute kidney injury on CKD stage III, not present on admission. 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 Overall, creatinine has improved and back to baseline. Mild hyperkalemia, resolved with IV Lasix. Other comorbidities: history of anxiety and depression, gastroesophageal reflux disease, normocytic anemia, CKD stage III, COPD, paroxysmal atrial fibrillation (on anticoagulation), chronic metabolic acidosis, asthma, fibromyalgia, hyperlipidemia, essential hypertension, IBS, temporal arteritis, negative stress test in August 2022. Disposition. Possible discharge home later today, provided blood pressure improved on recheck. Diet: ADULT DIET; Regular; 4 carb choices (60 gm/meal); Low Sodium (2 gm); No Caffeine    Code status: Full Code   ----------  Subjective  No new complaints today. Denies any needs. Hoping to go home later today or tomorrow. Objective  Physical exam:  Vitals: BP (!) 184/73   Pulse 78   Temp 98.4 °F (36.9 °C) (Oral)   Resp 13   Ht 5' (1.524 m)   Wt 139 lb 15.9 oz (63.5 kg)   SpO2 92%   BMI 27.34 kg/m²   Gen/overall appearance: Not in acute distress. Alert. Oriented x3  Head: Normocephalic, atraumatic  Eyes: EOMI, good acuity  ENT: Oral mucosa moist  Neck: No JVD, thyromegaly  CVS: Nml S1S2, no MRG, RRR  Pulm: Clear bilaterally. No crackles/wheezes  Gastrointestinal: Soft, NT/ND, +BS  Musculoskeletal: No edema. Warm  Neuro: No focal deficit. Moves extremity spontaneously. Psychiatry: Appropriate affect. Not agitated. Skin: Warm, dry with normal turgor. No rash  Capillary refill: Brisk,< 3 seconds   Peripheral Pulses: +2 palpable, equal bilaterally      24HR INTAKE/OUTPUT:    Intake/Output Summary (Last 24 hours) at 2/22/2023 1235  Last data filed at 2/21/2023 2230  Gross per 24 hour   Intake 1080 ml   Output 675 ml   Net 405 ml       I/O last 3 completed shifts: In: 9317 [P.O.:1620]  Out: 1500 [Urine:1500]  No intake/output data recorded.   Meds:    sodium bicarbonate  1,300 mg Oral 4x Daily    NIFEdipine  30 mg Oral Daily    heparin (porcine)  5,000 Units SubCUTAneous 3 times per day    carvedilol  6.25 mg Oral BID WC    amitriptyline  30 mg Oral Nightly    atorvastatin  80 mg Oral Nightly    buPROPion  150 mg Oral Daily    docusate sodium  100 mg Oral BID    DULoxetine  60 mg Oral Daily    cetirizine  10 mg Oral Daily    mometasone-formoterol  2 puff Inhalation BID    [Held by provider] furosemide  20 mg Oral Daily    [Held by provider] isosorbide mononitrate  60 mg Oral Daily    montelukast  10 mg Oral Daily    [Held by provider] pantoprazole  40 mg Oral QAM AC    ranolazine  1,000 mg Oral BID    insulin lispro  0-4 Units SubCUTAneous TID WC    insulin lispro  0-4 Units SubCUTAneous Nightly    sodium chloride flush  10 mL IntraVENous 2 times per day    aspirin  81 mg Oral Daily    insulin glargine  8 Units SubCUTAneous BID    tiZANidine  4 mg Oral BID    QUEtiapine  50 mg Oral Nightly    linaclotide  290 mcg Oral QAM AC     Infusions:    dextrose      sodium chloride       PRN Meds: labetalol, trimethobenzamide, ondansetron, promethazine, hydrOXYzine HCl, glucose, dextrose bolus **OR** dextrose bolus, glucagon (rDNA), dextrose, sodium chloride flush, sodium chloride, acetaminophen **OR** acetaminophen, polyethylene glycol, morphine    Labs/imaging:  CBC:   Recent Labs     02/20/23  1243 02/22/23  0637   WBC 6.7 5.9   HGB 9.0* 9.3*    212       BMP:    Recent Labs     02/20/23  1243 02/21/23  0719 02/22/23  0637   * 138 136   K 4.6 4.9 5.0    105 101   CO2 18* 18* 23   BUN 36* 29* 32*   CREATININE 1.9* 1.7* 1.5*   GLUCOSE 167* 143* 147*       Hepatic:   Recent Labs     02/22/23  0637   AST 13*   ALT 8*   BILITOT 0.3   ALKPHOS 143*         Naz Coats MD  -------------------------------  Rounding hospitalist

## 2023-02-23 ENCOUNTER — CARE COORDINATION (OUTPATIENT)
Dept: CARE COORDINATION | Age: 68
End: 2023-02-23

## 2023-02-23 NOTE — CARE COORDINATION
No answer to pt phone will attempt again at later date Sent inbasket message to Dr. Funes and Ce Crowe, PO.    Kortney Peralta, PharmD  Medication Therapy Management Pharmacist  [phone]

## 2023-02-24 ENCOUNTER — TELEPHONE (OUTPATIENT)
Dept: PRIMARY CARE CLINIC | Age: 68
End: 2023-02-24

## 2023-02-24 NOTE — TELEPHONE ENCOUNTER
Care Transitions Initial Follow Up Call    Outreach made within 2 business days of discharge: Yes    Patient: Fernando Matthews Patient : 1955   MRN: [id number]  Reason for Admission: There are no discharge diagnoses documented for the most recent discharge. Discharge Date: 23       Spoke with: Maren    Discharge department/facility: [facility] Interactive Patient Contact:  Was patient able to fill all prescriptions: Yes  Was patient instructed to bring all medications to the follow-up visit: Yes  Is patient taking all medications as directed in the discharge summary?  Yes  Does patient understand their discharge instructions: Yes  Does patient have questions or concerns that need addressed prior to 7-14 day follow up office visit: no    Scheduled appointment with PCP within 7-14 days    Follow Up  Future Appointments   Date Time Provider Yoni Davisi   3/7/2023  8:40 AM Jam Marie MD [address]   2023  4:00 PM Wendy Lake MD [place], [place]

## 2023-02-24 NOTE — TELEPHONE ENCOUNTER
Malathi Maldonado with the [place] [facility] called as an FYI to Dr. Seda Moreau that PT was hospitalized at [facility] from 2/17-2/22 for chest pain & that the Drs in the hospital made some changes to her medications.

## 2023-02-26 DIAGNOSIS — H92.09 OTALGIA, UNSPECIFIED LATERALITY: Primary | ICD-10-CM

## 2023-02-26 DIAGNOSIS — H66.90 EAR INFECTION: ICD-10-CM

## 2023-02-26 RX ORDER — FLUCONAZOLE 150 MG/1
150 TABLET ORAL ONCE
Qty: 1 TABLET | Refills: 0 | Status: SHIPPED | OUTPATIENT
Start: 2023-02-26 | End: 2023-02-26

## 2023-02-26 RX ORDER — AMOXICILLIN 500 MG/1
TABLET, FILM COATED ORAL
Qty: 30 TABLET | Refills: 0 | Status: SHIPPED | OUTPATIENT
Start: 2023-02-26

## 2023-02-26 NOTE — PROGRESS NOTES
She has several days of pain in ears possible ear infection which is not getting better and would  like an antibiotic.no fever. Says she has slight ear discharge. Previous infection of ears and antibiotics  Have helped.    Will call in antibiotic  She should follow up with Dr Cardenas

## 2023-03-06 RX ORDER — ALBUTEROL SULFATE 2.5 MG/3ML
SOLUTION RESPIRATORY (INHALATION)
Qty: 360 ML | Refills: 5 | Status: SHIPPED | OUTPATIENT
Start: 2023-03-06

## 2023-03-06 NOTE — TELEPHONE ENCOUNTER
Medication:   Requested Prescriptions     Pending Prescriptions Disp Refills    albuterol (PROVENTIL) (2.5 MG/3ML) 0.083% nebulizer solution [Pharmacy Med Name: ALBUTEROL SULFATE (2.5 MG/3 (2.5 MG/3ML Nebulization Solution] 360 mL 5     Sig: TAKE 3 MLS BY NEBULIZATION EVERY 4 HOURS AS NEEDED FOR WHEEZING        Last Filled:      Patient Phone Number: [phone] (home)     Last appt: 1/5/2023   Next appt: 3/7/2023    Last OARRS:   RX Monitoring 12/28/2022   Attestation -   Periodic Controlled Substance Monitoring Possible medication side effects, risk of tolerance/dependence & alternative treatments discussed.

## 2023-03-07 ENCOUNTER — TELEPHONE (OUTPATIENT)
Dept: PRIMARY CARE CLINIC | Age: 68
End: 2023-03-07

## 2023-03-07 ENCOUNTER — OFFICE VISIT (OUTPATIENT)
Dept: PRIMARY CARE CLINIC | Age: 68
End: 2023-03-07
Payer: COMMERCIAL

## 2023-03-07 VITALS
DIASTOLIC BLOOD PRESSURE: 60 MMHG | TEMPERATURE: 97 F | HEIGHT: 60 IN | WEIGHT: 132 LBS | BODY MASS INDEX: 25.91 KG/M2 | HEART RATE: 83 BPM | SYSTOLIC BLOOD PRESSURE: 140 MMHG

## 2023-03-07 DIAGNOSIS — E11.59 TYPE 2 DIABETES MELLITUS WITH OTHER CIRCULATORY COMPLICATION, WITH LONG-TERM CURRENT USE OF INSULIN (HCC): ICD-10-CM

## 2023-03-07 DIAGNOSIS — R52 PAIN DISORDER: ICD-10-CM

## 2023-03-07 DIAGNOSIS — I25.10 CORONARY ARTERY DISEASE INVOLVING NATIVE CORONARY ARTERY OF NATIVE HEART WITHOUT ANGINA PECTORIS: ICD-10-CM

## 2023-03-07 DIAGNOSIS — I10 ESSENTIAL HYPERTENSION: ICD-10-CM

## 2023-03-07 DIAGNOSIS — I25.10 CORONARY ARTERY DISEASE INVOLVING NATIVE CORONARY ARTERY OF NATIVE HEART WITHOUT ANGINA PECTORIS: Primary | ICD-10-CM

## 2023-03-07 DIAGNOSIS — Z79.4 TYPE 2 DIABETES MELLITUS WITH OTHER CIRCULATORY COMPLICATION, WITH LONG-TERM CURRENT USE OF INSULIN (HCC): ICD-10-CM

## 2023-03-07 DIAGNOSIS — G47.09 OTHER INSOMNIA: ICD-10-CM

## 2023-03-07 DIAGNOSIS — M79.7 FIBROMYALGIA: ICD-10-CM

## 2023-03-07 DIAGNOSIS — Z23 HIGH PRIORITY FOR COVID-19 VACCINATION: ICD-10-CM

## 2023-03-07 DIAGNOSIS — Z09 HOSPITAL DISCHARGE FOLLOW-UP: Primary | ICD-10-CM

## 2023-03-07 DIAGNOSIS — E11.40 CHRONIC PAINFUL DIABETIC NEUROPATHY (HCC): ICD-10-CM

## 2023-03-07 DIAGNOSIS — Z23 FLU VACCINE NEED: ICD-10-CM

## 2023-03-07 PROCEDURE — 1036F TOBACCO NON-USER: CPT | Performed by: INTERNAL MEDICINE

## 2023-03-07 PROCEDURE — G8427 DOCREV CUR MEDS BY ELIG CLIN: HCPCS | Performed by: INTERNAL MEDICINE

## 2023-03-07 PROCEDURE — 1090F PRES/ABSN URINE INCON ASSESS: CPT | Performed by: INTERNAL MEDICINE

## 2023-03-07 PROCEDURE — 1111F DSCHRG MED/CURRENT MED MERGE: CPT | Performed by: INTERNAL MEDICINE

## 2023-03-07 PROCEDURE — 2022F DILAT RTA XM EVC RTNOPTHY: CPT | Performed by: INTERNAL MEDICINE

## 2023-03-07 PROCEDURE — G8400 PT W/DXA NO RESULTS DOC: HCPCS | Performed by: INTERNAL MEDICINE

## 2023-03-07 PROCEDURE — 99214 OFFICE O/P EST MOD 30 MIN: CPT | Performed by: INTERNAL MEDICINE

## 2023-03-07 PROCEDURE — G8484 FLU IMMUNIZE NO ADMIN: HCPCS | Performed by: INTERNAL MEDICINE

## 2023-03-07 PROCEDURE — 3046F HEMOGLOBIN A1C LEVEL >9.0%: CPT | Performed by: INTERNAL MEDICINE

## 2023-03-07 PROCEDURE — 3077F SYST BP >= 140 MM HG: CPT | Performed by: INTERNAL MEDICINE

## 2023-03-07 PROCEDURE — 3078F DIAST BP <80 MM HG: CPT | Performed by: INTERNAL MEDICINE

## 2023-03-07 PROCEDURE — 1123F ACP DISCUSS/DSCN MKR DOCD: CPT | Performed by: INTERNAL MEDICINE

## 2023-03-07 PROCEDURE — 3017F COLORECTAL CA SCREEN DOC REV: CPT | Performed by: INTERNAL MEDICINE

## 2023-03-07 PROCEDURE — G8417 CALC BMI ABV UP PARAM F/U: HCPCS | Performed by: INTERNAL MEDICINE

## 2023-03-07 RX ORDER — FUROSEMIDE 20 MG/1
20 TABLET ORAL DAILY
Qty: 30 TABLET | Refills: 1 | Status: SHIPPED | OUTPATIENT
Start: 2023-03-07

## 2023-03-07 RX ORDER — INSULIN GLARGINE 100 [IU]/ML
10 INJECTION, SOLUTION SUBCUTANEOUS 2 TIMES DAILY
Qty: 8 ADJUSTABLE DOSE PRE-FILLED PEN SYRINGE | Refills: 4 | Status: SHIPPED | OUTPATIENT
Start: 2023-03-07

## 2023-03-07 RX ORDER — QUETIAPINE FUMARATE 25 MG/1
25-50 TABLET, FILM COATED ORAL NIGHTLY
Qty: 60 TABLET | Refills: 3 | Status: SHIPPED | OUTPATIENT
Start: 2023-03-07

## 2023-03-07 RX ORDER — CARVEDILOL 6.25 MG/1
6.25 TABLET ORAL 2 TIMES DAILY WITH MEALS
Qty: 60 TABLET | Refills: 3 | Status: SHIPPED | OUTPATIENT
Start: 2023-03-07

## 2023-03-07 RX ORDER — NIFEDIPINE 30 MG/1
30 TABLET, EXTENDED RELEASE ORAL DAILY
Qty: 30 TABLET | Refills: 3 | Status: SHIPPED | OUTPATIENT
Start: 2023-03-07

## 2023-03-07 RX ORDER — RANOLAZINE 1000 MG/1
1000 TABLET, EXTENDED RELEASE ORAL 2 TIMES DAILY
Qty: 60 TABLET | Refills: 8 | Status: SHIPPED | OUTPATIENT
Start: 2023-03-07

## 2023-03-07 SDOH — ECONOMIC STABILITY: HOUSING INSECURITY
IN THE LAST 12 MONTHS, WAS THERE A TIME WHEN YOU DID NOT HAVE A STEADY PLACE TO SLEEP OR SLEPT IN A SHELTER (INCLUDING NOW)?: NO

## 2023-03-07 SDOH — ECONOMIC STABILITY: FOOD INSECURITY: WITHIN THE PAST 12 MONTHS, THE FOOD YOU BOUGHT JUST DIDN'T LAST AND YOU DIDN'T HAVE MONEY TO GET MORE.: NEVER TRUE

## 2023-03-07 SDOH — ECONOMIC STABILITY: FOOD INSECURITY: WITHIN THE PAST 12 MONTHS, YOU WORRIED THAT YOUR FOOD WOULD RUN OUT BEFORE YOU GOT MONEY TO BUY MORE.: NEVER TRUE

## 2023-03-07 SDOH — ECONOMIC STABILITY: INCOME INSECURITY: HOW HARD IS IT FOR YOU TO PAY FOR THE VERY BASICS LIKE FOOD, HOUSING, MEDICAL CARE, AND HEATING?: NOT VERY HARD

## 2023-03-07 ASSESSMENT — ENCOUNTER SYMPTOMS
VOMITING: 0
CHEST TIGHTNESS: 0
ABDOMINAL DISTENTION: 0
SHORTNESS OF BREATH: 1
BACK PAIN: 1
BLOOD IN STOOL: 0
CONSTIPATION: 0
NAUSEA: 0

## 2023-03-07 NOTE — TELEPHONE ENCOUNTER
Last ov : 12/08/2022  Next ov :06/07/2023  Most recent lab : CMP ,RENAL FUNCTION 02/22/2023   Last EKG : 02/17/2023    Patient's pharmacy sent over a form for medication refill ,for 90 days supplies .

## 2023-03-07 NOTE — TELEPHONE ENCOUNTER
Spoke with Pedro Schofield and provided her with a verbal for home care, a referral needs to be faxed- pended for you to sign

## 2023-03-07 NOTE — TELEPHONE ENCOUNTER
[facility] called in needing a verbal okay for home care orders (as well as a signed order from Dr. David Gilbert). They needed the verbal from a nurse or MA.      Please call back: [phone]

## 2023-03-07 NOTE — PROGRESS NOTES
Subjective:      Patient ID: Ana Rosa Hua is a 78 y.o. female. 3/7/2023 Patient presents with: Follow-Up from Hospital  Spasms: Would like a muscle relaxer   Insomnia: States that she is still having trouble sleeping        Chest pain. Initial EKG and troponin unremarkable. Patient with negative stress test in August 2022. No significant improvement with nitroglycerin. Continue antiplatelet therapy, beta-blocker, statin. Troponin was relatively flat from 0.02-0.03. She was evaluated by cardiologist, noted to have noncardiac chest pain. Hypertensive urgency. She did have acute kidney injury, likely from quick blood pressure control. Continue current antihypertensive medications, with plan to follow-up with primary care physician for close monitoring within 1 week. Acute kidney injury on CKD stage III, not present on admission. 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 Overall, creatinine improved and back to baseline. Other comorbidities: history of anxiety and depression, gastroesophageal reflux disease, normocytic anemia, CKD stage III, COPD, paroxysmal atrial fibrillation (on anticoagulation), chronic metabolic acidosis, asthma, fibromyalgia, hyperlipidemia, essential hypertension, IBS, temporal arteritis, negative stress test in August 2022.                12/2/2022 Patient presents with:  Pain: Unable to get in with pain management- still looking for a dr              Last seen  Star Schmitt 1237 11/8/2022 Patient presents with:  Leg Swelling: Both legs swelling- unable to get in due to transportation issues  Nausea:  In morning when waking up and sometimes though the day    Ana Rosa Hua is a 78year old female with a past medical history of coronary artery disease status post stent placement, atrial fibrillation on Eliquis watchman implant in situ, hypertension, CHF, CKD, type 2 diabetes, COPD, fibromyalgia and chronic pain syndrome who presents with a chief complaint of chest pain and nausea. Patient did have a stress test performed at [facility] on August 28 that came back negative for evidence of ischemic changes. Troponins neg . Stress Test neg 8/22 . Discharged gome          Last seen  Star Schmitt 1237 8/4/2022 Patient presents with:  Diabetes  Hypertension  Headache  Stress:                6/22/2022 Patient presents with: Follow-Up from Hospital: [facility], 6/13-6/14/2022, headaches  CT Head and MRI Head done   Headache  Hypertension: states she is taking meds . 5/25/2022 Patient presents with:  Medicare AWV               1/27/2022 Patient presents with: Follow-Up from Hospital: [facility]-1/9/2022 - 1/13/2022 Chest pain, [place]    . Chest pain, ,, no further work-up recommended per cardiology    Diabetes mellitus, sliding scale    COPD, no acute exacerbation  . A. fib, controlled. Echo noted, discussed with Dr. Annabelle Méndez, at this time we will keep on aspirin and Plavix, she will follow-up with him as outpatient. To discuss watchman device  Renal insuff  monitor closely, improved creatinine to 1.4 this morning. Anemia,  Chronic    Peripheral vascular disease with discoloration of second left toe, vascular surgery consulted, Doppler noted with no significant occlusive disease, echo ordered per vascular . 10/1/2020               1/14/2020          9/24/19     8/31/19 !!!           8/29/19      hypertension, diabetes, hyperlipidemia, fibromyalgia, COPD, CKD, CHF and CAD           Review of Systems   Constitutional:  Negative for activity change, fatigue and fever. Flu vac 10/21     tdap 10/16      pneumovac    5/22 ; 10/13     Shingrex    covid vac 3/21  #2      HENT:          No teeth     No dentures    Eyes:         Last Eye Ex 3/20   Respiratory:  Positive for shortness of breath (baseline). Negative for chest tightness. Getting urges to smoke again ! Known COPD   Cardiovascular:  Negative for chest pain.         Known CAD with Stents . Atrial Fibrillation , S/P Watchman device 5/22   Gastrointestinal:  Negative for abdominal distention, blood in stool, constipation, nausea and vomiting. Upper / lower endopscopy 4/19       No Fh of ca colon . Genitourinary:  Negative for dysuria, frequency, menstrual problem, urgency and vaginal discharge. Hysterectomy   Mammogram 1/16    Musculoskeletal:  Positive for arthralgias, back pain, gait problem and myalgias. Pain Disorder was with  Pain Management    Neurological:  Positive for headaches (Chronic . off and on ). Hematological:            Did see Hematology for anemia . Bone Marrow exam Nl    Psychiatric/Behavioral:  Negative for behavioral problems and sleep disturbance. The patient is not nervous/anxious. Objective:   Physical Exam  Vitals reviewed. Constitutional:       General: She is not in acute distress. Comments:        Cardiovascular:      Rate and Rhythm: Regular rhythm. Heart sounds: Normal heart sounds. No friction rub. Pulmonary:      Breath sounds: No wheezing, rhonchi or rales. Abdominal:      Palpations: Abdomen is soft. Musculoskeletal:      Right lower leg: No edema. Left lower leg: No edema. Comments:        Neurological:      Mental Status: She is oriented to person, place, and time. Psychiatric:         Attention and Perception: Attention normal.         Mood and Affect: Affect normal.       Assessment:   Maren was seen today for follow-up from hospital, spasms and insomnia. Diagnoses and all orders for this visit:    Hospital discharge follow-up  -     NE DISCHARGE MEDS RECONCILED W/ CURRENT OUTPATIENT MED LIST    Pain disorder  Found Pain Special[facility] ? ?  -     Drug Panel-PM-HI Res-UR Interp-A;  Future    High priority for COVID-19 vaccination  needs [place] vac     Flu vaccine need    Type 2 diabetes mellitus with other circulatory complication, with long-term current use of insulin ([facility])A1C 9.8 . Increase Basaglar to 10 U bid   -     insulin glargine (BASAGLAR KWIKPEN) 100 UNIT/ML injection pen; Inject 10 Units into the skin 2 times daily  Insomnia   -     QUEtiapine (SEROQUEL) 25 MG tablet; Take 1-2 tablets by mouth nightly    Essential hypertension  -     furosemide (LASIX) 20 MG tablet; Take 1 tablet by mouth daily  -     carvedilol (COREG) 6.25 MG tablet; Take 1 tablet by mouth 2 times daily (with meals)  -     NIFEdipine (PROCARDIA XL) 30 MG extended release tablet; Take 1 tablet by mouth daily                Pain  fall out with Dr Frances Galindo . Needs to find new Dr for chronic pain management   s    -   Stage 3b chronic kidney disease ([facility] [address]  C/O nephrology               Headaches . Nl MRI/ MRA brain . Seen Neurology for tiny meningioma 6/22 . Watchful waiting recommended . Off Ubrelvi . -              Neuropathy        Coronary artery disease involving native coronary artery of native heart without angina pectoris  -        Diabetic gastroparesis ([facility])  Control sugars .        Other hyperlipidemia  80 mg Lipitor             Anxiety  and Depression      Plan:

## 2023-03-08 ENCOUNTER — PATIENT MESSAGE (OUTPATIENT)
Dept: PRIMARY CARE CLINIC | Age: 68
End: 2023-03-08

## 2023-03-08 NOTE — TELEPHONE ENCOUNTER
From: Gely Mendenhall  To: Dr. Tommy Medel: 3/8/2023 10:51 AM EST  Subject: Home service ASAP    Need for someone to call [facility] about my health

## 2023-03-08 NOTE — TELEPHONE ENCOUNTER
[facility] called back in to follow up with their message from yesterday.     They state that the verbal they needed had to come from a nurse so they need that signed order today.      Please fax to: [phone]    Best call back number: [phone]

## 2023-03-14 DIAGNOSIS — G47.09 OTHER INSOMNIA: ICD-10-CM

## 2023-03-14 DIAGNOSIS — R11.0 NAUSEA: ICD-10-CM

## 2023-03-14 NOTE — TELEPHONE ENCOUNTER
Medication:   Requested Prescriptions     Pending Prescriptions Disp Refills    ondansetron (ZOFRAN-ODT) 4 MG disintegrating tablet 30 tablet 1     Sig: Take 1 tablet by mouth 3 times daily as needed for Nausea or Vomiting        Last Filled:      Patient Phone Number: [phone] (home)     Last appt: 3/7/2023   Next appt: 6/7/2023    Last OARRS:   RX Monitoring 12/28/2022   Attestation -   Periodic Controlled Substance Monitoring Possible medication side effects, risk of tolerance/dependence & alternative treatments discussed.

## 2023-03-15 RX ORDER — QUETIAPINE FUMARATE 25 MG/1
25-50 TABLET, FILM COATED ORAL NIGHTLY
Qty: 60 TABLET | Refills: 3 | Status: SHIPPED | OUTPATIENT
Start: 2023-03-15

## 2023-03-15 RX ORDER — ONDANSETRON 4 MG/1
4 TABLET, ORALLY DISINTEGRATING ORAL 3 TIMES DAILY PRN
Qty: 30 TABLET | Refills: 1 | Status: SHIPPED | OUTPATIENT
Start: 2023-03-15

## 2023-03-15 NOTE — TELEPHONE ENCOUNTER
Medication:   Requested Prescriptions     Pending Prescriptions Disp Refills    QUEtiapine (SEROQUEL) 25 MG tablet 60 tablet 3     Sig: Take 1-2 tablets by mouth nightly        Last Filled:      Patient Phone Number: [phone] (home)     Last appt: 3/7/2023   Next appt: 6/7/2023    Last OARRS:   RX Monitoring 12/28/2022   Attestation -   Periodic Controlled Substance Monitoring Possible medication side effects, risk of tolerance/dependence & alternative treatments discussed.

## 2023-03-20 ENCOUNTER — TELEPHONE (OUTPATIENT)
Dept: ORTHOPEDIC SURGERY | Age: 68
End: 2023-03-20

## 2023-03-30 ENCOUNTER — OFFICE VISIT (OUTPATIENT)
Dept: ORTHOPEDIC SURGERY | Age: 68
End: 2023-03-30
Payer: COMMERCIAL

## 2023-03-30 VITALS — HEIGHT: 60 IN | BODY MASS INDEX: 25.91 KG/M2 | WEIGHT: 132 LBS | RESPIRATION RATE: 17 BRPM

## 2023-03-30 DIAGNOSIS — M54.16 LUMBAR RADICULOPATHY, RIGHT: Primary | ICD-10-CM

## 2023-03-30 DIAGNOSIS — F40.240 CLAUSTROPHOBIA: ICD-10-CM

## 2023-03-30 PROCEDURE — G8400 PT W/DXA NO RESULTS DOC: HCPCS | Performed by: PHYSICIAN ASSISTANT

## 2023-03-30 PROCEDURE — G8417 CALC BMI ABV UP PARAM F/U: HCPCS | Performed by: PHYSICIAN ASSISTANT

## 2023-03-30 PROCEDURE — G8428 CUR MEDS NOT DOCUMENT: HCPCS | Performed by: PHYSICIAN ASSISTANT

## 2023-03-30 PROCEDURE — 99213 OFFICE O/P EST LOW 20 MIN: CPT | Performed by: PHYSICIAN ASSISTANT

## 2023-03-30 PROCEDURE — 1123F ACP DISCUSS/DSCN MKR DOCD: CPT | Performed by: PHYSICIAN ASSISTANT

## 2023-03-30 PROCEDURE — 1036F TOBACCO NON-USER: CPT | Performed by: PHYSICIAN ASSISTANT

## 2023-03-30 PROCEDURE — G8484 FLU IMMUNIZE NO ADMIN: HCPCS | Performed by: PHYSICIAN ASSISTANT

## 2023-03-30 PROCEDURE — 1090F PRES/ABSN URINE INCON ASSESS: CPT | Performed by: PHYSICIAN ASSISTANT

## 2023-03-30 PROCEDURE — 3017F COLORECTAL CA SCREEN DOC REV: CPT | Performed by: PHYSICIAN ASSISTANT

## 2023-03-30 RX ORDER — DIAZEPAM 10 MG/1
10 TABLET ORAL PRN
Qty: 2 TABLET | Refills: 0 | Status: SHIPPED | OUTPATIENT
Start: 2023-03-30 | End: 2023-05-30

## 2023-03-30 NOTE — PROGRESS NOTES
patient is oriented to time, place and person. Mood & Affect:The patient's mood and affect are appropriate. Vascular: Examination reveals no swelling tenderness in upper or lower extremities. Good capillary refill. Lymphatic: The lymphatic examination bilaterally reveals all areas to be without enlargement or induration  Sensation: Sensation is intact without deficit  Coordination/Balance: Good coordination      LUMBAR/SACRAL EXAMINATION:  Inspection: Local inspection shows no step-off or bruising. Lumbar alignment is normal.  Sagittal and Coronal balance is neutral.      Palpation:   No evidence of tenderness at the midline. No tenderness bilaterally at the paraspinal or trochanters. There is no step-off or paraspinal spasm. Range of Motion: limited by 50% in all planes due to pain           Hip ER and IR are pain free and within normal limits. [facility] is              Strength:   Strength testing is 5/5 in all muscle groups tested. Special Tests:   Straight leg raise and crossed SLR negative. Leg length and pelvis level. Skin: There are no rashes, ulcerations or lesions. Reflexes: Reflexes are symmetrically 2+ at the patellar and ankle tendons. Clonus absent bilaterally at the feet. Gait & station: normal, patient ambulates without assistance  Additional Examinations:   RIGHT LOWER EXTREMITY: Inspection/examination of the right lower extremity does not show any tenderness, deformity or injury. Range of motion is unremarkable. There is no gross instability. There are no rashes, ulcerations or lesions. 3/5 strength with right EHL testing. Otherwise 5/5 strength of all other major motor groups. LEFT LOWER EXTREMITY:  Inspection/examination of the left lower extremity does not show any tenderness, deformity or injury. Range of motion is unremarkable. There is no gross instability. There are no rashes, ulcerations or lesions.   Strength and tone are normal.            X Rays: not performed

## 2023-04-07 ENCOUNTER — HOSPITAL ENCOUNTER (EMERGENCY)
Age: 68
Discharge: HOME | End: 2023-04-07
Payer: COMMERCIAL

## 2023-04-07 ENCOUNTER — APPOINTMENT (OUTPATIENT)
Dept: GENERAL RADIOLOGY | Age: 68
End: 2023-04-07
Payer: COMMERCIAL

## 2023-04-07 ENCOUNTER — TELEPHONE (OUTPATIENT)
Dept: PRIMARY CARE CLINIC | Age: 68
End: 2023-04-07

## 2023-04-07 VITALS
WEIGHT: 129.85 LBS | TEMPERATURE: 98.6 F | BODY MASS INDEX: 25.49 KG/M2 | HEIGHT: 60 IN | SYSTOLIC BLOOD PRESSURE: 179 MMHG | DIASTOLIC BLOOD PRESSURE: 75 MMHG | HEART RATE: 72 BPM | RESPIRATION RATE: 16 BRPM | OXYGEN SATURATION: 100 %

## 2023-04-07 DIAGNOSIS — R07.9 CHEST PAIN, UNSPECIFIED TYPE: Primary | ICD-10-CM

## 2023-04-07 LAB
ALBUMIN SERPL-MCNC: 3.6 G/DL (ref 3.4–5)
ALP SERPL-CCNC: 152 U/L (ref 40–129)
ALT SERPL-CCNC: 16 U/L (ref 10–40)
ANION GAP SERPL CALCULATED.3IONS-SCNC: 13 MMOL/L (ref 3–16)
AST SERPL-CCNC: 13 U/L (ref 15–37)
BASOPHILS # BLD: 0.1 K/UL (ref 0–0.2)
BASOPHILS NFR BLD: 0.9 %
BILIRUB DIRECT SERPL-MCNC: <0.2 MG/DL (ref 0–0.3)
BILIRUB INDIRECT SERPL-MCNC: ABNORMAL MG/DL (ref 0–1)
BILIRUB SERPL-MCNC: <0.2 MG/DL (ref 0–1)
BUN SERPL-MCNC: 45 MG/DL (ref 7–20)
CALCIUM SERPL-MCNC: 9.4 MG/DL (ref 8.3–10.6)
CHLORIDE SERPL-SCNC: 101 MMOL/L (ref 99–110)
CO2 SERPL-SCNC: 21 MMOL/L (ref 21–32)
CREAT SERPL-MCNC: 1.7 MG/DL (ref 0.6–1.2)
DEPRECATED RDW RBC AUTO: 15 % (ref 12.4–15.4)
EOSINOPHIL # BLD: 0.3 K/UL (ref 0–0.6)
EOSINOPHIL NFR BLD: 4.6 %
GFR SERPLBLD CREATININE-BSD FMLA CKD-EPI: 33 ML/MIN/{1.73_M2}
GLUCOSE SERPL-MCNC: 211 MG/DL (ref 70–99)
HCT VFR BLD AUTO: 32.4 % (ref 36–48)
HGB BLD-MCNC: 10.4 G/DL (ref 12–16)
LYMPHOCYTES # BLD: 2 K/UL (ref 1–5.1)
LYMPHOCYTES NFR BLD: 36.2 %
MCH RBC QN AUTO: 30.4 PG (ref 26–34)
MCHC RBC AUTO-ENTMCNC: 32.2 G/DL (ref 31–36)
MCV RBC AUTO: 94.5 FL (ref 80–100)
MONOCYTES # BLD: 0.3 K/UL (ref 0–1.3)
MONOCYTES NFR BLD: 5.3 %
NEUTROPHILS # BLD: 3 K/UL (ref 1.7–7.7)
NEUTROPHILS NFR BLD: 53 %
PLATELET # BLD AUTO: 233 K/UL (ref 135–450)
PMV BLD AUTO: 7.6 FL (ref 5–10.5)
POTASSIUM SERPL-SCNC: 4.3 MMOL/L (ref 3.5–5.1)
PROT SERPL-MCNC: 7.6 G/DL (ref 6.4–8.2)
RBC # BLD AUTO: 3.43 M/UL (ref 4–5.2)
SODIUM SERPL-SCNC: 135 MMOL/L (ref 136–145)
TROPONIN T SERPL-MCNC: <0.01 NG/ML
WBC # BLD AUTO: 5.6 K/UL (ref 4–11)

## 2023-04-07 PROCEDURE — 80048 BASIC METABOLIC PNL TOTAL CA: CPT

## 2023-04-07 PROCEDURE — 93005 ELECTROCARDIOGRAM TRACING: CPT | Performed by: EMERGENCY MEDICINE

## 2023-04-07 PROCEDURE — 84484 ASSAY OF TROPONIN QUANT: CPT

## 2023-04-07 PROCEDURE — 6370000000 HC RX 637 (ALT 250 FOR IP): Performed by: PHYSICIAN ASSISTANT

## 2023-04-07 PROCEDURE — 80076 HEPATIC FUNCTION PANEL: CPT

## 2023-04-07 PROCEDURE — 85025 COMPLETE CBC W/AUTO DIFF WBC: CPT

## 2023-04-07 PROCEDURE — 71046 X-RAY EXAM CHEST 2 VIEWS: CPT

## 2023-04-07 RX ORDER — ASPIRIN 81 MG/1
244 TABLET, CHEWABLE ORAL ONCE
Status: COMPLETED | OUTPATIENT
Start: 2023-04-07 | End: 2023-04-07

## 2023-04-07 RX ORDER — ONDANSETRON 2 MG/ML
4 INJECTION INTRAMUSCULAR; INTRAVENOUS ONCE
Status: DISCONTINUED | OUTPATIENT
Start: 2023-04-07 | End: 2023-04-08 | Stop reason: HOSPADM

## 2023-04-07 RX ORDER — MORPHINE SULFATE 2 MG/ML
2 INJECTION, SOLUTION INTRAMUSCULAR; INTRAVENOUS ONCE
Status: DISCONTINUED | OUTPATIENT
Start: 2023-04-07 | End: 2023-04-08 | Stop reason: HOSPADM

## 2023-04-07 RX ADMIN — ASPIRIN 244 MG: 81 TABLET, CHEWABLE ORAL at 21:19

## 2023-04-07 NOTE — TELEPHONE ENCOUNTER
Ariela Sparks with [facility] called in regarding PT. They received a prescription request for Freestyle Alem glucose monitor. They state that they did not receive any OV notes or supporting documentation & they also would like [facility] know [facility]      Please call back: [phone]

## 2023-04-07 NOTE — TELEPHONE ENCOUNTER
Called and spoke with Gunjan Lopez and she provided an email address to send pt' last OV notes    Emailed to [email or web address]

## 2023-04-08 LAB
EKG ATRIAL RATE: 69 BPM
EKG DIAGNOSIS: NORMAL
EKG P AXIS: -27 DEGREES
EKG P-R INTERVAL: 86 MS
EKG Q-T INTERVAL: 414 MS
EKG QRS DURATION: 82 MS
EKG QTC CALCULATION (BAZETT): 443 MS
EKG R AXIS: 53 DEGREES
EKG T AXIS: 111 DEGREES
EKG VENTRICULAR RATE: 69 BPM

## 2023-04-08 PROCEDURE — 93010 ELECTROCARDIOGRAM REPORT: CPT | Performed by: INTERNAL MEDICINE

## 2023-04-08 ASSESSMENT — ENCOUNTER SYMPTOMS
SHORTNESS OF BREATH: 0
NAUSEA: 0
COLOR CHANGE: 0
ABDOMINAL PAIN: 0
VOMITING: 0

## 2023-04-08 NOTE — ED PROVIDER NOTES
COMPARISON: 02/17/2023 HISTORY: ORDERING SYSTEM PROVIDED HISTORY: chest pain TECHNOLOGIST PROVIDED HISTORY: Reason for exam:->chest pain Reason for Exam: chest pain FINDINGS: Chronic interstitial opacities seen throughout both lungs. No acute infiltrate identified. Cardial pericardial silhouette is stable, with redemonstration of right paratracheal stripe prominence. No pneumothorax. No free air. No acute bony abnormality. No acute abnormality identified. No results found. PROCEDURES   Unless otherwise noted below, none     Procedures    CRITICAL CARE TIME (.cctime)   I performed a total Critical Care time of 15 minutes, excluding separately reportable procedures. There was a high probability of clinically significant/life threatening deterioration in the patient's condition which required my urgent intervention. Not limited to multiple reexaminations, discussions with attending physician and consultants. PAST MEDICAL HISTORY      has a past medical history of Acid reflux, Anemia, Anxiety and depression, Arthritis, Asthma, Atrial fibrillation ([address] CAD (coronary artery disease) (12/3/2012), Cerebral artery occlusion with cerebral infarction [facility]), CHF (congestive heart failure) ([address] Chronic kidney disease--stage III, COPD (chronic obstructive pulmonary disease) ([address] DM2 (diabetes mellitus, type 2) ([address] Dysarthria, Fibromyalgia (6/7/2016), Headache(784.0) (2/19/2013), Hemisensory loss, History of blood transfusion (11/2020), Hyperlipidemia, Hypertension, IBS (irritable bowel syndrome), Inferior vena cava occlusion ([address] Keratitis, Meningioma ([address] MI, old, Neuropathy, Superior vena cava obstruction, Temporal arteritis ([address] (2/24/2014), and Wears glasses.      EMERGENCY DEPARTMENT COURSE and DIFFERENTIAL DIAGNOSIS/MDM:   Vitals:    Vitals:    04/07/23 2012   BP: (!) 179/75   Pulse: 72   Resp: 16   Temp: 98.6 °F (37 °C)   TempSrc: Oral   SpO2: 100%   Weight: 129 lb 13.6 oz (58.9 kg)

## 2023-04-08 NOTE — ED TRIAGE NOTES
Melvina Lopez is a 78 y.o. female brought herself to the ER for eval of chest pain that started in the center of her chest. The patient states that the pain started two hours ago and radiates down her left arm. The patient also states that she has been in and out of A fib all day. The patient is alert and oriented with an open and patent airway with a normal respiratory effort.

## 2023-04-14 ENCOUNTER — TELEPHONE (OUTPATIENT)
Dept: PRIMARY CARE CLINIC | Age: 68
End: 2023-04-14

## 2023-04-14 DIAGNOSIS — I10 ESSENTIAL HYPERTENSION: ICD-10-CM

## 2023-04-17 RX ORDER — FUROSEMIDE 20 MG/1
20 TABLET ORAL DAILY
Qty: 30 TABLET | Refills: 1 | Status: SHIPPED | OUTPATIENT
Start: 2023-04-17

## 2023-04-19 ENCOUNTER — PATIENT MESSAGE (OUTPATIENT)
Dept: PRIMARY CARE CLINIC | Age: 68
End: 2023-04-19

## 2023-04-19 DIAGNOSIS — I10 ESSENTIAL HYPERTENSION: ICD-10-CM

## 2023-04-19 DIAGNOSIS — I25.10 CORONARY ARTERY DISEASE INVOLVING NATIVE CORONARY ARTERY OF NATIVE HEART WITHOUT ANGINA PECTORIS: ICD-10-CM

## 2023-04-19 DIAGNOSIS — G47.09 OTHER INSOMNIA: ICD-10-CM

## 2023-04-19 NOTE — TELEPHONE ENCOUNTER
From: Gertrudis Berger  To: Dr. Reyes Childs: 4/19/2023 9:42 AM EDT  Subject: Inhaler     Need symbicort

## 2023-04-19 NOTE — TELEPHONE ENCOUNTER
Medication:   Requested Prescriptions     Pending Prescriptions Disp Refills    atorvastatin (LIPITOR) 80 MG tablet 90 tablet 5     Sig: Take 1 tablet by mouth nightly    omeprazole (PRILOSEC) 20 MG delayed release capsule 30 capsule 1     Sig: Take 1 capsule by mouth Daily    carvedilol (COREG) 6.25 MG tablet 60 tablet 3     Sig: Take 1 tablet by mouth 2 times daily (with meals)    NIFEdipine (PROCARDIA XL) 30 MG extended release tablet 30 tablet 3     Sig: Take 1 tablet by mouth daily    QUEtiapine (SEROQUEL) 25 MG tablet 60 tablet 3     Sig: Take 1-2 tablets by mouth nightly       Last Filled:      Patient Phone Number: [phone] (home)     Last appt: 3/7/2023   Next appt: 6/7/2023    Last Lipid:   Lab Results   Component Value Date/Time    CHOL 258 02/18/2023 05:19 AM    TRIG 206 02/18/2023 05:19 AM    HDL 44 02/18/2023 05:19 AM    HDL 58 05/14/2012 03:27 PM    LDLCALC 173 02/18/2023 05:19 AM

## 2023-04-20 RX ORDER — CARVEDILOL 6.25 MG/1
6.25 TABLET ORAL 2 TIMES DAILY WITH MEALS
Qty: 60 TABLET | Refills: 3 | Status: SHIPPED | OUTPATIENT
Start: 2023-04-20

## 2023-04-20 RX ORDER — OMEPRAZOLE 20 MG/1
20 CAPSULE, DELAYED RELEASE ORAL DAILY
Qty: 30 CAPSULE | Refills: 1 | Status: SHIPPED | OUTPATIENT
Start: 2023-04-20

## 2023-04-20 RX ORDER — QUETIAPINE FUMARATE 25 MG/1
25-50 TABLET, FILM COATED ORAL NIGHTLY
Qty: 60 TABLET | Refills: 3 | Status: SHIPPED | OUTPATIENT
Start: 2023-04-20

## 2023-04-20 RX ORDER — ATORVASTATIN CALCIUM 80 MG/1
80 TABLET, FILM COATED ORAL NIGHTLY
Qty: 90 TABLET | Refills: 5 | Status: SHIPPED | OUTPATIENT
Start: 2023-04-20

## 2023-04-20 RX ORDER — NIFEDIPINE 30 MG/1
30 TABLET, EXTENDED RELEASE ORAL DAILY
Qty: 30 TABLET | Refills: 3 | Status: SHIPPED | OUTPATIENT
Start: 2023-04-20

## 2023-04-21 ENCOUNTER — HOSPITAL ENCOUNTER (OUTPATIENT)
Dept: CT IMAGING | Age: 68
Discharge: HOME | End: 2023-04-21
Payer: COMMERCIAL

## 2023-04-21 ENCOUNTER — APPOINTMENT (OUTPATIENT)
Dept: CT IMAGING | Age: 68
End: 2023-04-21
Payer: COMMERCIAL

## 2023-04-21 ENCOUNTER — HOSPITAL ENCOUNTER (OUTPATIENT)
Dept: MRI IMAGING | Age: 68
Discharge: HOME | End: 2023-04-21
Payer: COMMERCIAL

## 2023-04-21 DIAGNOSIS — M54.16 LUMBAR RADICULOPATHY, RIGHT: ICD-10-CM

## 2023-04-21 DIAGNOSIS — F40.240 CLAUSTROPHOBIA: ICD-10-CM

## 2023-04-21 DIAGNOSIS — R10.84 GENERALIZED ABDOMINAL PAIN: ICD-10-CM

## 2023-04-21 PROCEDURE — 72148 MRI LUMBAR SPINE W/O DYE: CPT

## 2023-04-21 PROCEDURE — 6360000004 HC RX CONTRAST MEDICATION: Performed by: INTERNAL MEDICINE

## 2023-04-21 PROCEDURE — 74176 CT ABD & PELVIS W/O CONTRAST: CPT

## 2023-04-21 RX ADMIN — IOPAMIDOL 50 ML: 612 INJECTION, SOLUTION INTRAVENOUS at 07:43

## 2023-04-24 ENCOUNTER — TELEPHONE (OUTPATIENT)
Dept: PRIMARY CARE CLINIC | Age: 68
End: 2023-04-24

## 2023-04-24 NOTE — TELEPHONE ENCOUNTER
----- Message from [email or web address] Bernadette Case [id number] sent at 4/24/2023  9:48 AM EDT -----  Subject: Medication Problem    Medication: UNIFINE PENTIPS 31G X 8 MM MISC  Dosage: As prescribed  Ordering Provider: courtney    Question/Problem: The pharmacy faxed over a request for supplies that the   patient is completely out of for her DM. The quantity on the form   completed by the office is not able to be dispersed by the pharmacy. Please call the pharmacy back or fix the faxed form from them previously. Pharmacy: [facility] [address] [phone]    ---------------------------------------------------------------------------  --------------  Kehinde WHYTE  [phone]; OK to leave message on voicemail  ---------------------------------------------------------------------------  --------------    SCRIPT ANSWERS  Relationship to Patient: Covered Entity  Covered Entity Type: Pharmacy?   Representative Name: Dorcas Arreguin

## 2023-04-27 RX ORDER — ALCOHOL ANTISEPTIC PADS
PADS, MEDICATED (EA) TOPICAL
Qty: 100 EACH | Refills: 3 | Status: SHIPPED | OUTPATIENT
Start: 2023-04-27

## 2023-04-27 RX ORDER — PEN NEEDLE, DIABETIC 31 GX5/16"
NEEDLE, DISPOSABLE MISCELLANEOUS
Qty: 100 EACH | Refills: 11 | Status: SHIPPED | OUTPATIENT
Start: 2023-04-27

## 2023-04-29 ENCOUNTER — APPOINTMENT (OUTPATIENT)
Dept: GENERAL RADIOLOGY | Age: 68
End: 2023-04-29
Payer: COMMERCIAL

## 2023-04-29 ENCOUNTER — HOSPITAL ENCOUNTER (EMERGENCY)
Age: 68
Discharge: HOME | End: 2023-04-29
Attending: EMERGENCY MEDICINE
Payer: COMMERCIAL

## 2023-04-29 VITALS
TEMPERATURE: 98.9 F | RESPIRATION RATE: 14 BRPM | BODY MASS INDEX: 25.23 KG/M2 | DIASTOLIC BLOOD PRESSURE: 141 MMHG | OXYGEN SATURATION: 100 % | SYSTOLIC BLOOD PRESSURE: 167 MMHG | HEART RATE: 68 BPM | WEIGHT: 129.19 LBS

## 2023-04-29 DIAGNOSIS — R07.9 CHEST PAIN, UNSPECIFIED TYPE: Primary | ICD-10-CM

## 2023-04-29 LAB
ALBUMIN SERPL-MCNC: 3.3 G/DL (ref 3.4–5)
ALBUMIN/GLOB SERPL: 0.9 {RATIO} (ref 1.1–2.2)
ALP SERPL-CCNC: 174 U/L (ref 40–129)
ALT SERPL-CCNC: 9 U/L (ref 10–40)
ANION GAP SERPL CALCULATED.3IONS-SCNC: 12 MMOL/L (ref 3–16)
AST SERPL-CCNC: 13 U/L (ref 15–37)
BASOPHILS # BLD: 0 K/UL (ref 0–0.2)
BASOPHILS NFR BLD: 0.6 %
BILIRUB SERPL-MCNC: <0.2 MG/DL (ref 0–1)
BUN SERPL-MCNC: 31 MG/DL (ref 7–20)
CALCIUM SERPL-MCNC: 8.7 MG/DL (ref 8.3–10.6)
CHLORIDE SERPL-SCNC: 99 MMOL/L (ref 99–110)
CO2 SERPL-SCNC: 21 MMOL/L (ref 21–32)
CREAT SERPL-MCNC: 1.3 MG/DL (ref 0.6–1.2)
DEPRECATED RDW RBC AUTO: 13.8 % (ref 12.4–15.4)
EOSINOPHIL # BLD: 0.3 K/UL (ref 0–0.6)
EOSINOPHIL NFR BLD: 5 %
GFR SERPLBLD CREATININE-BSD FMLA CKD-EPI: 45 ML/MIN/{1.73_M2}
GLUCOSE SERPL-MCNC: 189 MG/DL (ref 70–99)
HCT VFR BLD AUTO: 29.5 % (ref 36–48)
HGB BLD-MCNC: 9.7 G/DL (ref 12–16)
LIPASE SERPL-CCNC: 173 U/L (ref 13–60)
LYMPHOCYTES # BLD: 1.6 K/UL (ref 1–5.1)
LYMPHOCYTES NFR BLD: 27.1 %
MCH RBC QN AUTO: 30.7 PG (ref 26–34)
MCHC RBC AUTO-ENTMCNC: 33 G/DL (ref 31–36)
MCV RBC AUTO: 93 FL (ref 80–100)
MONOCYTES # BLD: 0.4 K/UL (ref 0–1.3)
MONOCYTES NFR BLD: 7.2 %
NEUTROPHILS # BLD: 3.4 K/UL (ref 1.7–7.7)
NEUTROPHILS NFR BLD: 60.1 %
PLATELET # BLD AUTO: 218 K/UL (ref 135–450)
PMV BLD AUTO: 8.2 FL (ref 5–10.5)
POTASSIUM SERPL-SCNC: 3.9 MMOL/L (ref 3.5–5.1)
PROT SERPL-MCNC: 6.9 G/DL (ref 6.4–8.2)
RBC # BLD AUTO: 3.17 M/UL (ref 4–5.2)
SODIUM SERPL-SCNC: 132 MMOL/L (ref 136–145)
TROPONIN, HIGH SENSITIVITY: 23 NG/L (ref 0–14)
TROPONIN, HIGH SENSITIVITY: 24 NG/L (ref 0–14)
WBC # BLD AUTO: 5.7 K/UL (ref 4–11)

## 2023-04-29 PROCEDURE — 36415 COLL VENOUS BLD VENIPUNCTURE: CPT

## 2023-04-29 PROCEDURE — 83690 ASSAY OF LIPASE: CPT

## 2023-04-29 PROCEDURE — 80053 COMPREHEN METABOLIC PANEL: CPT

## 2023-04-29 PROCEDURE — 93005 ELECTROCARDIOGRAM TRACING: CPT | Performed by: EMERGENCY MEDICINE

## 2023-04-29 PROCEDURE — 71045 X-RAY EXAM CHEST 1 VIEW: CPT

## 2023-04-29 PROCEDURE — 84484 ASSAY OF TROPONIN QUANT: CPT

## 2023-04-29 PROCEDURE — 99285 EMERGENCY DEPT VISIT HI MDM: CPT

## 2023-04-29 PROCEDURE — 85025 COMPLETE CBC W/AUTO DIFF WBC: CPT

## 2023-04-29 PROCEDURE — 6370000000 HC RX 637 (ALT 250 FOR IP): Performed by: EMERGENCY MEDICINE

## 2023-04-29 RX ORDER — ONDANSETRON 4 MG/1
4 TABLET, ORALLY DISINTEGRATING ORAL ONCE
Status: COMPLETED | OUTPATIENT
Start: 2023-04-29 | End: 2023-04-29

## 2023-04-29 RX ORDER — ACETAMINOPHEN 500 MG
1000 TABLET ORAL ONCE
Status: COMPLETED | OUTPATIENT
Start: 2023-04-29 | End: 2023-04-29

## 2023-04-29 RX ADMIN — ACETAMINOPHEN 1000 MG: 500 TABLET ORAL at 15:37

## 2023-04-29 RX ADMIN — ONDANSETRON 4 MG: 4 TABLET, ORALLY DISINTEGRATING ORAL at 15:37

## 2023-04-29 ASSESSMENT — PAIN SCALES - GENERAL: PAINLEVEL_OUTOF10: 8

## 2023-04-29 ASSESSMENT — HEART SCORE: ECG: 0

## 2023-04-29 ASSESSMENT — LIFESTYLE VARIABLES
HOW MANY STANDARD DRINKS CONTAINING ALCOHOL DO YOU HAVE ON A TYPICAL DAY: PATIENT DOES NOT DRINK
HOW OFTEN DO YOU HAVE A DRINK CONTAINING ALCOHOL: NEVER

## 2023-04-29 ASSESSMENT — PAIN - FUNCTIONAL ASSESSMENT: PAIN_FUNCTIONAL_ASSESSMENT: 0-10

## 2023-04-29 ASSESSMENT — ENCOUNTER SYMPTOMS
NAUSEA: 1
SHORTNESS OF BREATH: 0
SORE THROAT: 0
RESPIRATORY NEGATIVE: 1
ABDOMINAL PAIN: 0

## 2023-04-29 ASSESSMENT — PAIN DESCRIPTION - LOCATION: LOCATION: CHEST

## 2023-04-29 NOTE — ED NOTES
Dr Mansoor Gonzalez at bedside attempting [address] [facility], [address] [place] [address]  04/29/23 [id number]

## 2023-04-29 NOTE — ED NOTES
Multiple attempts to obtain IV and blood work unsuccessful, pt states that she has had a total of 4 ports placed in the past, Dr Karan Arias is aware      Stanley Simeon RN  04/29/23 [id number]

## 2023-04-29 NOTE — ED NOTES
Pt arrived to the ED via ems, pt states that she was laying on the couch watching tv and started having chest pain, pt is alert and orient vss afebrile upon arrival to the ed, pt is 100% oxygen on room air, pt states that chest pain is a 8/10 on pain scale, Pt resting in bed at this time, laying in a supine position with head of bed elevated . Call light remains in reach instructed pt how to use, and encouraged pt to call if needed assistance, no distress noted. RR even and unlabored, skin warm and dry. No needs at this time. Will continue to monitor closely.        Reanna Hernandes RN  04/29/23 [id number]

## 2023-04-29 NOTE — ED PROVIDER NOTES
1%          Appropriate for outpatient management      I estimate there is LOW risk for PULMONARY EMBOLISM, ACUTE CORONARY SYNDROME, OR THORACIC AORTIC DISSECTION, thus I consider the discharge disposition reasonable. The patient is at low risk for mortality based on demographic, history and clinical factors. Given the best available information and clinical assessment, I estimate the risk of hospitalization to be greater than risk of treatment at home. I have explained to the patient that the risk could rapidly change, given precautions for return and instructions. Explained to patient that the risk for mortality is low based on demographic, history and clinical factors. I discussed with patient the results of evaluation in the ED, diagnosis, care, and prognosis. The plan is to discharge to home. Patient is in agreement with plan and questions have been answered. I also discussed with patient the reasons which may require a return visit and the importance of follow-up care. The patient is well-appearing, nontoxic, and improved at the time of discharge. Patient agrees to call to arrange follow-up care as directed. Patient understands to return immediately for worsening/change in symptoms. I am the Primary Clinician of Record. FINAL IMPRESSION      1.  Chest pain, unspecified type          DISPOSITION/PLAN     DISPOSITION Decision To Discharge 04/29/2023 05:36:56 PM      PATIENT REFERRED TO:  Leeanne Cruz, [address] [address]  [phone]    Schedule an appointment as soon as possible for a visit         DISCHARGE MEDICATIONS:  New Prescriptions    No medications on file       DISCONTINUED MEDICATIONS:  Discontinued Medications    No medications on file              (Please note that portions of this note were completed with a voice recognition program.  Efforts were made to edit the dictations but occasionally words are mis-transcribed.)    Isra Soliman MD

## 2023-04-29 NOTE — ED NOTES
Pt resting in bed at this time, laying in a supine position with head of bed elevated . Call light remains in reach instructed pt how to use, and encouraged pt to call if needed assistance, no distress noted. RR even and unlabored, skin warm and dry. No needs at this time. Will continue to monitor closely.        Reyes Barber RN  04/29/23 7490

## 2023-04-30 LAB
EKG ATRIAL RATE: 70 BPM
EKG DIAGNOSIS: NORMAL
EKG P AXIS: -23 DEGREES
EKG P-R INTERVAL: 100 MS
EKG Q-T INTERVAL: 414 MS
EKG QRS DURATION: 86 MS
EKG QTC CALCULATION (BAZETT): 447 MS
EKG R AXIS: 29 DEGREES
EKG T AXIS: 132 DEGREES
EKG VENTRICULAR RATE: 70 BPM

## 2023-04-30 PROCEDURE — 93010 ELECTROCARDIOGRAM REPORT: CPT | Performed by: INTERNAL MEDICINE

## 2023-05-01 ENCOUNTER — CARE COORDINATION (OUTPATIENT)
Dept: CARE COORDINATION | Age: 68
End: 2023-05-01

## 2023-05-01 NOTE — CARE COORDINATION
Ambulatory Care Coordination  ED Follow up Call    Reason for ED visit:  Chest Pain  Status:     iMPROVED    Did you call your PCP prior to going to the ED?  no    Did you receive a discharge instructions from the Emergency Room? yes  Review of Instructions:     Understands what to report/when to return?:  yes   Understands discharge instructions?:  yes   Following discharge instructions?:  yes   If not why? Are there any new complaints of pain? no  New Pain Meds?  no   Constipation prophylaxis needed? N/A    If you have a wound is the dressing clean, dry, and intact? N/a  Understands wound care regimen? N/a    Are there any other complaints/concerns that you wish to tell your provider? N/A    FU appts/Provider:    Future Appointments   Date Time Provider Yoni De Jesus   5/8/2023  9:30 AM BILLY Welsh   6/7/2023  9:20 AM MD J Luis Alberts [facility] [place] [facility]   6/9/2023  4:00 PM Boom Buck MD [facility]           New Medications?:   NO     Medication Reconciliation by phone - N/A  Understands Medications? YES  Taking Medications? YES  Can you swallow your pills? Any further needs in the home i.e. Equipment? Link to services in community?:     Which services:   Spoke with pt following recent ed visit for c/o chest pain, pt reports pain has resolved and she  is feeling better. When asked about b/p. Bg and weight ranges pt states they have been varied states her bp was high on way to hospital 319 systolic and blood sugars have been as high as 300's. States she started using Linard Peers last week anf has been checking her blood sugar more often. Not checking her weight regularly as her scale is broken, but states it has been running between 130 and 133lbs whenever she has it checked at appointments or hospital. Reviewed upcoming appointment with pt, and she will call to schedule a hospital follow up appointment with her pcp   Will follow up in a week or two to check status.  And

## 2023-05-08 ENCOUNTER — OFFICE VISIT (OUTPATIENT)
Dept: ORTHOPEDIC SURGERY | Age: 68
End: 2023-05-08
Payer: COMMERCIAL

## 2023-05-08 ENCOUNTER — PATIENT MESSAGE (OUTPATIENT)
Dept: PRIMARY CARE CLINIC | Age: 68
End: 2023-05-08

## 2023-05-08 VITALS — HEIGHT: 60 IN | BODY MASS INDEX: 26.11 KG/M2 | WEIGHT: 133 LBS

## 2023-05-08 DIAGNOSIS — M54.16 LUMBAR RADICULOPATHY, RIGHT: Primary | ICD-10-CM

## 2023-05-08 PROCEDURE — 1036F TOBACCO NON-USER: CPT | Performed by: PHYSICIAN ASSISTANT

## 2023-05-08 PROCEDURE — 99213 OFFICE O/P EST LOW 20 MIN: CPT | Performed by: PHYSICIAN ASSISTANT

## 2023-05-08 PROCEDURE — 1123F ACP DISCUSS/DSCN MKR DOCD: CPT | Performed by: PHYSICIAN ASSISTANT

## 2023-05-08 PROCEDURE — G8417 CALC BMI ABV UP PARAM F/U: HCPCS | Performed by: PHYSICIAN ASSISTANT

## 2023-05-08 PROCEDURE — G8427 DOCREV CUR MEDS BY ELIG CLIN: HCPCS | Performed by: PHYSICIAN ASSISTANT

## 2023-05-08 PROCEDURE — G8400 PT W/DXA NO RESULTS DOC: HCPCS | Performed by: PHYSICIAN ASSISTANT

## 2023-05-08 PROCEDURE — 3017F COLORECTAL CA SCREEN DOC REV: CPT | Performed by: PHYSICIAN ASSISTANT

## 2023-05-08 PROCEDURE — 1090F PRES/ABSN URINE INCON ASSESS: CPT | Performed by: PHYSICIAN ASSISTANT

## 2023-05-08 NOTE — TELEPHONE ENCOUNTER
Inocencio Sanabria MD  You 2 minutes ago (3:33 PM)     [place]  You has seen Dr Rosangela Mena in the past . She is a Headache specialist that you can see     Maria A Anguiano MD  [phone]    Msg sent topt'

## 2023-05-08 NOTE — PROGRESS NOTES
no rashes, ulcerations or lesions. Reflexes: Reflexes are symmetrically 2+ at the patellar and ankle tendons. Clonus absent bilaterally at the feet. Gait & station: normal, patient ambulates without assistance  Additional Examinations:   RIGHT LOWER EXTREMITY: Inspection/examination of the right lower extremity does not show any tenderness, deformity or injury. Range of motion is unremarkable. There is no gross instability. There are no rashes, ulcerations or lesions. 3/5 strength with right EHL testing. Otherwise 5/5 strength of all other major motor groups. LEFT LOWER EXTREMITY:  Inspection/examination of the left lower extremity does not show any tenderness, deformity or injury. Range of motion is unremarkable. There is no gross instability. There are no rashes, ulcerations or lesions. Strength and tone are normal.         X Rays: not performed in the office today:     MRI of the Lumbar Spine dated 4/21/2023 reviewed and discussed today. Narrative & Impression  EXAMINATION:  MRI OF THE LUMBAR SPINE WITHOUT CONTRAST, 4/21/2023 7:16 am     TECHNIQUE:  Multiplanar multisequence MRI of the lumbar spine was performed without the  administration of intravenous contrast.     COMPARISON:  Lumbar spine plain film series dated 12/20/2022. CT abdomen/pelvis without contrast dated 10/27/2020. HISTORY:  ORDERING SYSTEM PROVIDED HISTORY: Lumbar radiculopathy, right  TECHNOLOGIST PROVIDED HISTORY:  What is the sedation requirement?->None     FINDINGS:  BONES/ALIGNMENT: There is normal alignment of the spine. The vertebral body  heights are maintained. The bone marrow signal appears unremarkable. SPINAL CORD: The conus terminates normally. SOFT TISSUES: No paraspinal mass identified. L1-L2: Mild facet hypertrophy. No stenosis. L2-L3: Mild facet hypertrophy. No stenosis. L3-L4: Mild facet hypertrophy. No stenosis.      L4-L5: Mild disc space narrowing, posterior disc bulge, facet hypertrophy

## 2023-05-08 NOTE — TELEPHONE ENCOUNTER
From: Sparkle Jameson  To: Dr. Renetta Gilbert: 5/8/2023 2:06 PM EDT  Subject: Off balance     Dr singer ARGELIA so tired of being in pain these headaches ARGELIA depressed my nerves is threw the roof can you recommend someone to help me taking so much meds just don't understand

## 2023-05-10 ENCOUNTER — TELEPHONE (OUTPATIENT)
Dept: PRIMARY CARE CLINIC | Age: 68
End: 2023-05-10

## 2023-05-14 DIAGNOSIS — I25.10 CORONARY ARTERY DISEASE INVOLVING NATIVE CORONARY ARTERY OF NATIVE HEART WITHOUT ANGINA PECTORIS: ICD-10-CM

## 2023-05-14 DIAGNOSIS — I10 ESSENTIAL HYPERTENSION: ICD-10-CM

## 2023-05-14 DIAGNOSIS — G47.09 OTHER INSOMNIA: ICD-10-CM

## 2023-05-15 NOTE — TELEPHONE ENCOUNTER
Medication:   Requested Prescriptions     Pending Prescriptions Disp Refills    HM ASPIRIN EC LOW DOSE 81 MG EC tablet [Pharmacy Med Name:  LOW DOSE ASPIRIN EC 81 M 81 Tablet] 30 tablet 5     Sig: TAKE 1 TABLET BY MOUTH DAILY    QUEtiapine (SEROQUEL) 25 MG tablet [Pharmacy Med Name: QUETIAPINE FUMARATE 25 MG T 25 Tablet] 60 tablet 3     Sig: TAKE 1-2 TABLETS BY MOUTH NIGHTLY    omeprazole (PRILOSEC) 20 MG delayed release capsule [Pharmacy Med Name: OMEPRAZOLE 20 MG CPDR 20 Capsule] 30 capsule 1     Sig: TAKE 1 CAPSULE BY MOUTH DAILY        Last Filled:      Patient Phone Number: [phone] (home)     Last appt: 3/7/2023   Next appt: 6/7/2023    Last OARRS:   RX Monitoring 3/30/2023   Attestation -   Periodic Controlled Substance Monitoring Possible medication side effects, risk of tolerance/dependence & alternative treatments discussed. ;No signs of potential drug abuse or diversion identified.

## 2023-05-16 DIAGNOSIS — R09.81 CHRONIC NASAL CONGESTION: ICD-10-CM

## 2023-05-16 NOTE — TELEPHONE ENCOUNTER
PT called in requesting a couple of more medications that she needs refilled. PT says that she needs a refill for Nifedipine & Furosemide. Please send to [facility].      LOV: 3/7/23  NOV: 6/7/23

## 2023-05-16 NOTE — TELEPHONE ENCOUNTER
Medication:   Requested Prescriptions     Pending Prescriptions Disp Refills    montelukast (SINGULAIR) 10 MG tablet [Pharmacy Med Name: MONTELUKAST SOD 10 MG TAB 10 Tablet] 30 tablet 1     Sig: TAKE 1 TABLET BY MOUTH DAILY        Last Filled:      Patient Phone Number: [phone] (home)     Last appt: 3/7/2023   Next appt: 6/7/2023    Last OARRS:   RX Monitoring 3/30/2023   Attestation -   Periodic Controlled Substance Monitoring Possible medication side effects, risk of tolerance/dependence & alternative treatments discussed. ;No signs of potential drug abuse or diversion identified.

## 2023-05-17 ENCOUNTER — CARE COORDINATION (OUTPATIENT)
Dept: CASE MANAGEMENT | Age: 68
End: 2023-05-17

## 2023-05-17 RX ORDER — OMEPRAZOLE 20 MG/1
20 CAPSULE, DELAYED RELEASE ORAL DAILY
Qty: 30 CAPSULE | Refills: 1 | Status: SHIPPED | OUTPATIENT
Start: 2023-05-17

## 2023-05-17 RX ORDER — MONTELUKAST SODIUM 10 MG/1
10 TABLET ORAL DAILY
Qty: 30 TABLET | Refills: 1 | Status: SHIPPED | OUTPATIENT
Start: 2023-05-17

## 2023-05-17 RX ORDER — NIFEDIPINE 30 MG/1
30 TABLET, EXTENDED RELEASE ORAL DAILY
Qty: 30 TABLET | Refills: 3 | Status: SHIPPED | OUTPATIENT
Start: 2023-05-17

## 2023-05-17 RX ORDER — ASPIRIN 81 MG/1
TABLET, DELAYED RELEASE ORAL
Qty: 30 TABLET | Refills: 5 | Status: SHIPPED | OUTPATIENT
Start: 2023-05-17

## 2023-05-17 RX ORDER — QUETIAPINE FUMARATE 25 MG/1
25-50 TABLET, FILM COATED ORAL NIGHTLY
Qty: 60 TABLET | Refills: 3 | Status: SHIPPED | OUTPATIENT
Start: 2023-05-17

## 2023-05-17 RX ORDER — FUROSEMIDE 20 MG/1
20 TABLET ORAL DAILY
Qty: 30 TABLET | Refills: 1 | Status: SHIPPED | OUTPATIENT
Start: 2023-05-17

## 2023-05-17 NOTE — CARE COORDINATION
Ambulatory Care Coordination Note  2023    Patient Current Location:  [facility] [address] [facility]     LPN CC contacted the patient by telephone. Verified name and  with patient as identifiers. Provided introduction to self, and explanation of the LPN CC role. Challenges to be reviewed by the provider   Additional needs identified to be addressed with provider: No  none               Method of communication with provider: none. ACM: Betty Dye LPN  Patient reports she has a bad headache. She has sob when she moves around her place. Her BS runs in the 200's and has had some in the 300's. She stated she's trying to watch her carbohydrates. Patient doesn't have a working scale. She has an appointment today to get an injection for her back. Offered patient enrollment in the Remote Patient Monitoring (RPM) program for in-home monitoring:  offer on future call . Lab Results       None            Care Coordination Interventions    Referral from Primary Care Provider: Yes  Suggested Interventions and Community Resources          Goals Addressed    None         Future Appointments   Date Time Provider Yoni De Jesus   2023  9:20 AM Lesleigh Grego, MD [address] [facility] [place] - [place]   2023  4:00 PM Gertrudis Brito MD [facility]   ,   Diabetes Assessment    Medic Alert ID: No  Meal Planning: Avoidance of concentrated sweets   How often do you test your blood sugar?: Daily (Comment: two to three times daily)   Do you have barriers with adherence to non-pharmacologic self-management interventions?  (Nutrition/Exercise/Self-Monitoring): No   Have you ever had to go to the ED for symptoms of low blood sugar?: No            , and   Congestive Heart Failure Assessment    Do you understand a low sodium diet?: Yes  Do you understand how to read food labels?: Yes  How many restaurant meals do you eat per week?: 0  Do you salt your food before tasting it?: No         Symptoms:

## 2023-05-25 ENCOUNTER — CARE COORDINATION (OUTPATIENT)
Dept: CARE COORDINATION | Age: 68
End: 2023-05-25

## 2023-05-25 NOTE — CARE COORDINATION
Ambulatory Care Coordination Note  2023    Patient Current Location:  Home: [address]  [address] [place] [address]     ACM contacted the patient by telephone. Verified name and  with patient as identifiers. Provided introduction to self, and explanation of the ACM role. Challenges to be reviewed by the provider   Additional needs identified to be addressed with provider: No  none               Method of communication with provider: none. ACM: Eva Jack RN    Spoke with pt this date for care coordination pt reports she still has pain and headache.states she had injection in her back with no relief  from pain. Discussed some alternate pain relief measure, heat pad  position changes, relaxation  techniques pt states she is willing to try these. Doscussed last couple of blood glucose reading and they have been in the 200's  , discussed diet modifications and pt state she has been trying but feel there are so may limitation since she has multiple medical conditions, discussed sending literature out to pt home for review, pt states that will be great. Reviewed previous pcp note regarding ptf following up with Endocrinologist pt provided  [facility] contact number to call and schedule an appointment. Reviewed safety precaution to prevent injury and pt stats she received her rollator this week and is still awaiting her shower chair. Chf education , healthy tips for summer reviewed and sent via my chart. No other issues or concerns noted. Heart Failure Education outreach Date/Time: 2023 4:35 PM    Ambulatory Care Manager (ACM) contacted the patient by telephone to perform Ambulatory Care Coordination. Verified name and  with patient as identifiers. Provided introduction to self, and explanation of the Ambulatory Care Manager's role. ACM reviewed that a Health Healthy tips for the Summer packet has been mailed to the them.  ACM reviewed CHF zones, daily weights, fluid

## 2023-05-30 ENCOUNTER — PATIENT MESSAGE (OUTPATIENT)
Dept: PRIMARY CARE CLINIC | Age: 68
End: 2023-05-30

## 2023-05-31 NOTE — TELEPHONE ENCOUNTER
From: Griselda Baxter  To: Dr. Anthony Glasgow: 5/30/2023 2:55 PM EDT  Subject: Letter    Need letter saying what my diagnosis and how you been treating me thank you

## 2023-06-07 ENCOUNTER — PATIENT MESSAGE (OUTPATIENT)
Dept: VASCULAR SURGERY | Age: 68
End: 2023-06-07

## 2023-06-07 NOTE — TELEPHONE ENCOUNTER
From: Susana Gomez  To: Dr. Luna Romp: 6/7/2023 4:23 PM EDT  Subject: Pain     Having tight in my legs and it hurts when ARGELIA walking

## 2023-06-07 NOTE — TELEPHONE ENCOUNTER
The tightness is in her calves, been going on 2-3 days and is getting worse.      She is scheduled to come in Friday at 8:45

## 2023-06-08 ENCOUNTER — CARE COORDINATION (OUTPATIENT)
Dept: CARE COORDINATION | Age: 68
End: 2023-06-08

## 2023-06-08 NOTE — CARE COORDINATION
symptoms of low blood sugar?: No       No patient-reported symptoms        , and   General Assessment    Do you have any symptoms that are causing concern?: Yes  Progression since Onset: Unchanged  Reported Symptoms: Pain (Comment: legs)

## 2023-06-09 ENCOUNTER — OFFICE VISIT (OUTPATIENT)
Dept: VASCULAR SURGERY | Age: 68
End: 2023-06-09
Payer: COMMERCIAL

## 2023-06-09 ENCOUNTER — PATIENT MESSAGE (OUTPATIENT)
Dept: PRIMARY CARE CLINIC | Age: 68
End: 2023-06-09

## 2023-06-09 VITALS — BODY MASS INDEX: 25.97 KG/M2 | WEIGHT: 133 LBS | DIASTOLIC BLOOD PRESSURE: 92 MMHG | SYSTOLIC BLOOD PRESSURE: 186 MMHG

## 2023-06-09 DIAGNOSIS — I10 ESSENTIAL HYPERTENSION: ICD-10-CM

## 2023-06-09 DIAGNOSIS — E78.2 MIXED HYPERLIPIDEMIA: ICD-10-CM

## 2023-06-09 DIAGNOSIS — I82.211: ICD-10-CM

## 2023-06-09 DIAGNOSIS — E11.40 CHRONIC PAINFUL DIABETIC NEUROPATHY (HCC): ICD-10-CM

## 2023-06-09 DIAGNOSIS — M54.50 LOW BACK PAIN RADIATING TO RIGHT LEG: ICD-10-CM

## 2023-06-09 DIAGNOSIS — M79.604 LEG PAIN, ANTERIOR, RIGHT: ICD-10-CM

## 2023-06-09 DIAGNOSIS — I82.220 INFERIOR VENA CAVA OCCLUSION (HCC): Primary | ICD-10-CM

## 2023-06-09 DIAGNOSIS — M79.604 LOW BACK PAIN RADIATING TO RIGHT LEG: ICD-10-CM

## 2023-06-09 PROCEDURE — G8427 DOCREV CUR MEDS BY ELIG CLIN: HCPCS | Performed by: SURGERY

## 2023-06-09 PROCEDURE — G8400 PT W/DXA NO RESULTS DOC: HCPCS | Performed by: SURGERY

## 2023-06-09 PROCEDURE — 2022F DILAT RTA XM EVC RTNOPTHY: CPT | Performed by: SURGERY

## 2023-06-09 PROCEDURE — 3046F HEMOGLOBIN A1C LEVEL >9.0%: CPT | Performed by: SURGERY

## 2023-06-09 PROCEDURE — 3077F SYST BP >= 140 MM HG: CPT | Performed by: SURGERY

## 2023-06-09 PROCEDURE — 1036F TOBACCO NON-USER: CPT | Performed by: SURGERY

## 2023-06-09 PROCEDURE — 1123F ACP DISCUSS/DSCN MKR DOCD: CPT | Performed by: SURGERY

## 2023-06-09 PROCEDURE — 3017F COLORECTAL CA SCREEN DOC REV: CPT | Performed by: SURGERY

## 2023-06-09 PROCEDURE — 1090F PRES/ABSN URINE INCON ASSESS: CPT | Performed by: SURGERY

## 2023-06-09 PROCEDURE — 3080F DIAST BP >= 90 MM HG: CPT | Performed by: SURGERY

## 2023-06-09 PROCEDURE — 99214 OFFICE O/P EST MOD 30 MIN: CPT | Performed by: SURGERY

## 2023-06-09 PROCEDURE — G8417 CALC BMI ABV UP PARAM F/U: HCPCS | Performed by: SURGERY

## 2023-06-09 ASSESSMENT — ENCOUNTER SYMPTOMS
EYES NEGATIVE: 1
RESPIRATORY NEGATIVE: 1
ALLERGIC/IMMUNOLOGIC NEGATIVE: 1
GASTROINTESTINAL NEGATIVE: 1

## 2023-06-09 NOTE — TELEPHONE ENCOUNTER
From: Rohit Mendoza  To: Dr. Mario Reich: 6/9/2023 10:34 AM EDT  Subject: Blood pressure     My blood pressure is running high this morning 198/89 it's been like that a few days

## 2023-06-09 NOTE — PROGRESS NOTES
Constipation, Disp: , Rfl:     linaclotide (LINZESS) 290 MCG CAPS capsule, Take 1 capsule by mouth every morning (before breakfast), Disp: , Rfl:     hydrOXYzine HCl (ATARAX) 25 MG tablet, Take 1 tablet by mouth 3 times daily as needed for Itching, Disp: , Rfl:     calcitRIOL (ROCALTROL) 0.25 MCG capsule, Take 1 capsule by mouth once a week, Disp: , Rfl:     ferrous sulfate (IRON 325) 325 (65 Fe) MG tablet, Take 1 tablet by mouth daily (with breakfast), Disp: , Rfl:     amitriptyline (ELAVIL) 10 MG tablet, Take 3 tablets by mouth nightly, Disp: , Rfl:     DULoxetine (CYMBALTA) 60 MG extended release capsule, Take 1 capsule by mouth daily, Disp: , Rfl:     nitroGLYCERIN (NITROSTAT) 0.4 MG SL tablet, Place 1 tablet under the tongue every 5 minutes as needed for Chest pain up to max of 3 total doses. If no relief after 1 dose, call 911., Disp: 25 tablet, Rfl: 3    diclofenac sodium (VOLTAREN) 1 % GEL, Apply 4 g topically 4 times daily (Patient taking differently: Apply 4 g topically 4 times daily as needed), Disp: 250 g, Rfl: 4    acetaminophen (TYLENOL) 500 MG tablet, Take 1 tablet by mouth 4 times daily as needed for Pain, Disp: 120 tablet, Rfl: 5    buPROPion (WELLBUTRIN SR) 150 MG extended release tablet, Take 1 tablet by mouth daily, Disp: , Rfl:     fexofenadine (ALLEGRA) 180 MG tablet, Take 1 tablet by mouth daily, Disp: , Rfl:     tiZANidine (ZANAFLEX) 4 MG tablet, Take 1 tablet by mouth 2 times daily, Disp: , Rfl:     montelukast (SINGULAIR) 10 MG tablet, TAKE 1 TABLET BY MOUTH DAILY, Disp: 30 tablet, Rfl: 1    fluticasone-salmeterol (ADVAIR) 500-50 MCG/ACT AEPB diskus inhaler, Inhale 1 puff into the lungs in the morning and 1 puff in the evening., Disp: 60 each, Rfl: 3    clotrimazole-betamethasone (LOTRISONE) 1-0.05 % cream, Apply topically 2 times daily.  (Patient taking differently: 2 times daily as needed Apply topically 2 times daily.), Disp: 5 g, Rfl: 5    docusate sodium (COLACE) 100 MG

## 2023-06-12 RX ORDER — SULFAMETHOXAZOLE AND TRIMETHOPRIM 800; 160 MG/1; MG/1
1 TABLET ORAL 2 TIMES DAILY
Qty: 10 TABLET | Refills: 0 | Status: SHIPPED | OUTPATIENT
Start: 2023-06-12 | End: 2023-06-17

## 2023-06-21 ENCOUNTER — CARE COORDINATION (OUTPATIENT)
Dept: PRIMARY CARE CLINIC | Age: 68
End: 2023-06-21

## 2023-06-27 ENCOUNTER — TELEPHONE (OUTPATIENT)
Dept: PRIMARY CARE CLINIC | Age: 68
End: 2023-06-27

## 2023-06-28 ENCOUNTER — TELEPHONE (OUTPATIENT)
Dept: PRIMARY CARE CLINIC | Age: 68
End: 2023-06-28

## 2023-07-12 ENCOUNTER — TELEPHONE (OUTPATIENT)
Dept: PRIMARY CARE CLINIC | Age: 68
End: 2023-07-12

## 2023-07-12 ENCOUNTER — CARE COORDINATION (OUTPATIENT)
Dept: CASE MANAGEMENT | Age: 68
End: 2023-07-12

## 2023-07-12 NOTE — CARE COORDINATION
Ambulatory Care Coordination Note  2023    Patient Current Location:  Home: [address] [place] [address]     LPN CC contacted the patient by telephone. Verified name and  with patient as identifiers. Provided introduction to self, and explanation of the LPN CC role. Challenges to be reviewed by the provider   Additional needs identified to be addressed with provider: No  none               Method of communication with provider: none. ACM: Priyanka Aldana LPN  Spoke to patient she reports she is tired and nauseated. She took her nausea medicine and she feels better now. Appetite needs improvement. She is nauseated often. Fluid intake is good. No bladder or bowel problems. . Patient denies sob, chills, fever, cough or fatigue. She has back pain and h/a. She is weak on her right side. Patient stated she has been in and out of the hospital a couple times and missed her endocrine appointment. She will schedule it again. Declined assistance. Taking medication as prescribed. She is waiting on someone from [facility] to come. No needs at this time. Plan:  Follow up in a week  Assess needs      Offered patient enrollment in the Remote Patient Monitoring (RPM) program for in-home monitoring:  currently enrolled .     Lab Results       None            Care Coordination Interventions    Referral from Primary Care Provider: Yes  Suggested Interventions and Community Resources          Goals Addressed    None         Future Appointments   Date Time Provider [facility] [address]   2023  9:40 AM MD Flory White [facility] [place] - [place]   12/15/2023  8:00 AM Alejandra Hinds MD [facility]/EN [address]  Care Coordinator  [phone]

## 2023-07-13 ENCOUNTER — TELEPHONE (OUTPATIENT)
Dept: PRIMARY CARE CLINIC | Age: 68
End: 2023-07-13

## 2023-07-14 ENCOUNTER — TELEPHONE (OUTPATIENT)
Dept: PRIMARY CARE CLINIC | Age: 68
End: 2023-07-14

## 2023-07-18 ENCOUNTER — PATIENT MESSAGE (OUTPATIENT)
Dept: PRIMARY CARE CLINIC | Age: 68
End: 2023-07-18

## 2023-07-18 NOTE — TELEPHONE ENCOUNTER
From: Curtis Hewitt  To: Dr. Concepción Arroyo: 7/18/2023 3:06 PM EDT  Subject: HRS    Do I have liver problems

## 2023-07-19 ENCOUNTER — CARE COORDINATION (OUTPATIENT)
Dept: PRIMARY CARE CLINIC | Age: 68
End: 2023-07-19

## 2023-07-19 ENCOUNTER — CARE COORDINATION (OUTPATIENT)
Dept: CASE MANAGEMENT | Age: 68
End: 2023-07-19

## 2023-07-19 NOTE — CARE COORDINATION
Ambulatory Care Coordination Note  7/19/2023      Attempted to contact patient for follow up transition call. Left voicemail message to return call with an update on condition since discharge. Contact information provided. Will continue to follow up.       Lab Results       None            Care Coordination Interventions    Referral from Primary Care Provider: Yes  Suggested Interventions and Community Resources          Goals Addressed    None         Future Appointments   Date Time Provider [address]   12/15/2023  8:00 AM Genesis Hdz MD [facility]/EN [address] [facility]  Care Coordinator  [phone]

## 2023-07-21 ENCOUNTER — TELEMEDICINE (OUTPATIENT)
Dept: PRIMARY CARE CLINIC | Age: 68
End: 2023-07-21

## 2023-07-21 ENCOUNTER — TELEPHONE (OUTPATIENT)
Dept: PRIMARY CARE CLINIC | Age: 68
End: 2023-07-21

## 2023-07-21 DIAGNOSIS — R26.9 GAIT ABNORMALITY: ICD-10-CM

## 2023-07-21 DIAGNOSIS — R63.4 WEIGHT LOSS: ICD-10-CM

## 2023-07-21 DIAGNOSIS — E11.40 CHRONIC PAINFUL DIABETIC NEUROPATHY (HCC): Primary | ICD-10-CM

## 2023-07-21 DIAGNOSIS — M79.7 FIBROMYALGIA: ICD-10-CM

## 2023-07-21 DIAGNOSIS — I25.10 CORONARY ARTERY DISEASE INVOLVING NATIVE CORONARY ARTERY OF NATIVE HEART WITHOUT ANGINA PECTORIS: ICD-10-CM

## 2023-07-21 DIAGNOSIS — Z09 HOSPITAL DISCHARGE FOLLOW-UP: ICD-10-CM

## 2023-07-21 RX ORDER — CALCIUM CARBONATE 160(400)MG
1 TABLET,CHEWABLE ORAL DAILY
Qty: 1 EACH | Refills: 0 | Status: SHIPPED | OUTPATIENT
Start: 2023-07-21

## 2023-07-21 ASSESSMENT — ENCOUNTER SYMPTOMS
CONSTIPATION: 0
BLOOD IN STOOL: 0
VOMITING: 0
ABDOMINAL DISTENTION: 0
CHEST TIGHTNESS: 0
NAUSEA: 0

## 2023-07-21 NOTE — TELEPHONE ENCOUNTER
LMOM for a call back- rx was sent for this in April    Spoke with pt- she states she mis spoke and realized she did not get the shower chair nor the rollator. Pt only received a BP cuff and scale. Pt has [facility]V appt today and will get new rx to send again

## 2023-07-21 NOTE — PROGRESS NOTES
native coronary artery of native heart without angina pectoris  -        Diabetic gastroparesis (HCC)  Control sugars .        Other hyperlipidemia  80 mg Lipitor             Anxiety  and Depression      Plan:

## 2023-07-21 NOTE — TELEPHONE ENCOUNTER
Justin Rosa from [employer] called stating that pt is requesting a shower chair. Pt is experiencing weakness, unsteady when showering.      Justin Rosa can be reached at [phone]    Please fax order to [phone]      Pt last OV 3/7/23    Please advise

## 2023-07-24 NOTE — TELEPHONE ENCOUNTER
----- Message from [facility] sent at 7/24/2023 10:43 AM EDT -----  Subject: Message to Provider    QUESTIONS  Information for Provider? Anamaria Musa with [employer] called in to return a call   she missed from [facility] [place]. She was calling in to let her know that she   needed another order for a shower chair to be resent to her. [phone]   is their fax number. Please contact Anamaria Vigil at [phone]  ---------------------------------------------------------------------------  --------------  Maria De Jesus WHYTE  [phone]; OK to leave message on voicemail  ---------------------------------------------------------------------------  --------------  SCRIPT ANSWERS  Relationship to Patient? Covered Entity  Covered Entity Type? Health Insurance? Representative Name?  Ty Isaac

## 2023-07-26 ENCOUNTER — CARE COORDINATION (OUTPATIENT)
Dept: PRIMARY CARE CLINIC | Age: 68
End: 2023-07-26

## 2023-07-26 NOTE — CARE COORDINATION
Attempted to reach pt again for care coordination no answer to phone.  Will end this episode as unable to reshher and no response back

## 2023-08-03 ENCOUNTER — TELEPHONE (OUTPATIENT)
Dept: PRIMARY CARE CLINIC | Age: 68
End: 2023-08-03

## 2023-08-03 NOTE — TELEPHONE ENCOUNTER
----- Message from Katelyn Espitia, [address] sent at 8/2/2023 12:39 PM EDT -----  Subject: Hospital Follow Up    QUESTIONS  What hospital was the Patient Discharged from? [facility]   Date of Discharge? 2023-07-27  Discharge Location? Home    CALL BACK INFO  What is the best way for the office to contact you? OK to leave message on voicemail  Preferred Call Back Phone Number? [phone]    SCRIPT ANSWERS  Relationship to Patient? Covered Entity  Covered Entity Type? Health Insurance? Representative Name?  Nelly Connor

## 2023-08-03 NOTE — ED TRIAGE NOTES
Patient presents to ED complaining of right sided headache. 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 Patient states she has been having headaches for a few months and is scheduled to have tumor removed but states pain became unbearable 2 hours ago. Patient states she has had ongoing blurred vision and some numbness to the right side of her face, denies any new changes. Denies light sensitivity. Patient states she took her regular percocet and ibuprofen at 0800. States this has not affected pain level. MD at bedside. Patient resting on bed, respirations even and easy at this time. Patient appears uncomfortable. 4 = No assist / stand by assistance

## 2023-08-07 ENCOUNTER — PATIENT MESSAGE (OUTPATIENT)
Dept: PRIMARY CARE CLINIC | Age: 68
End: 2023-08-07

## 2023-08-07 DIAGNOSIS — R63.4 WEIGHT LOSS: ICD-10-CM

## 2023-08-07 NOTE — TELEPHONE ENCOUNTER
From: Dg Longoria  To: Dr. Braun Awe: 8/7/2023 10:32 AM EDT  Subject: Ensure      Can you please send prec to [facility] [address] for my ensure milk [facility] doesn't do it anymore thanks

## 2023-08-09 ENCOUNTER — TELEPHONE (OUTPATIENT)
Dept: PRIMARY CARE CLINIC | Age: 68
End: 2023-08-09

## 2023-08-09 NOTE — TELEPHONE ENCOUNTER
[facility] called stating that PT had a telehealth visit with them today & PT was complaining of chest pressure that was radiating through her left arm and shoulder that was giving a feeling of heaviness & her arm \"felt funny\" -- but no specific description of what that meant. They noted that PT was en route to the emergency room to be evaluated.      Best call back number: [phone]

## 2023-08-10 DIAGNOSIS — I25.10 CORONARY ARTERY DISEASE INVOLVING NATIVE CORONARY ARTERY OF NATIVE HEART WITHOUT ANGINA PECTORIS: ICD-10-CM

## 2023-08-10 NOTE — TELEPHONE ENCOUNTER
Medication:   Requested Prescriptions     Pending Prescriptions Disp Refills    buPROPion (WELLBUTRIN SR) 150 MG extended release tablet [Pharmacy Med Name: BUPROPION HCL  MG  Tablet] 12 tablet 5     Sig: TAKE 1 TABLET BY MOUTH 2 TIMES DAILY        Last Filled:      Patient Phone Number: [phone] (home)     Last appt: 7/21/2023   Next appt: Visit date not found    Last OARRS:   RX Monitoring 3/30/2023   Attestation -   Periodic Controlled Substance Monitoring Possible medication side effects, risk of tolerance/dependence & alternative treatments discussed. ;No signs of potential drug abuse or diversion identified.

## 2023-08-11 RX ORDER — RANOLAZINE 1000 MG/1
1000 TABLET, EXTENDED RELEASE ORAL 2 TIMES DAILY
Qty: 180 TABLET | Refills: 1 | Status: SHIPPED | OUTPATIENT
Start: 2023-08-11

## 2023-08-11 RX ORDER — BUPROPION HYDROCHLORIDE 150 MG/1
TABLET, EXTENDED RELEASE ORAL
Qty: 12 TABLET | Refills: 5 | Status: SHIPPED | OUTPATIENT
Start: 2023-08-11

## 2023-08-12 DIAGNOSIS — I10 ESSENTIAL HYPERTENSION: ICD-10-CM

## 2023-08-14 ENCOUNTER — TELEPHONE (OUTPATIENT)
Dept: PRIMARY CARE CLINIC | Age: 68
End: 2023-08-14

## 2023-08-14 NOTE — TELEPHONE ENCOUNTER
----- Message from Cyndi Colindres sent at 8/13/2023 10:59 AM EDT -----  Regarding: Vitamin   Contact: [phone]  Dr Tracey Velazquez could you order me some vitamin B

## 2023-08-15 RX ORDER — OMEPRAZOLE 20 MG/1
20 CAPSULE, DELAYED RELEASE ORAL DAILY
Qty: 30 CAPSULE | Refills: 1 | Status: SHIPPED | OUTPATIENT
Start: 2023-08-15

## 2023-08-24 ENCOUNTER — TELEPHONE (OUTPATIENT)
Dept: PRIMARY CARE CLINIC | Age: 68
End: 2023-08-24

## 2023-08-24 DIAGNOSIS — I25.10 CORONARY ARTERY DISEASE INVOLVING NATIVE CORONARY ARTERY OF NATIVE HEART WITHOUT ANGINA PECTORIS: ICD-10-CM

## 2023-08-24 NOTE — TELEPHONE ENCOUNTER
[address] [facility] will fax request for skilled nursing. Care was resumed on 8/23/23. Also [facility] needs a new order for chewable 81 milligram aspirin as the hospital changed it from the enteric coated version. Please advise.

## 2023-08-25 RX ORDER — ASPIRIN 81 MG/1
81 TABLET, CHEWABLE ORAL DAILY
Qty: 30 TABLET | Refills: 3 | Status: SHIPPED | OUTPATIENT
Start: 2023-08-25

## 2023-08-29 ENCOUNTER — PATIENT MESSAGE (OUTPATIENT)
Dept: PRIMARY CARE CLINIC | Age: 68
End: 2023-08-29

## 2023-08-29 NOTE — TELEPHONE ENCOUNTER
From: Sunita Hinojosa  To: Dr. Inez Avendano: 8/29/2023 9:17 AM EDT  Subject: Feeling cold     For some reason I'm keep being cold and it's getting worse just can't figure out what going on my hands and feet fills like a block of ice

## 2023-08-30 ENCOUNTER — PATIENT MESSAGE (OUTPATIENT)
Dept: PRIMARY CARE CLINIC | Age: 68
End: 2023-08-30

## 2023-08-31 NOTE — TELEPHONE ENCOUNTER
From: Ben Becker  To: Dr. Isis Santiagor: 8/30/2023 12:57 PM EDT  Subject: Ensure     Having a hard time getting the ensure milk do you know of any place that I can get.  it fill

## 2023-09-11 ENCOUNTER — TELEPHONE (OUTPATIENT)
Dept: PRIMARY CARE CLINIC | Age: 68
End: 2023-09-11

## 2023-10-02 ENCOUNTER — TELEPHONE (OUTPATIENT)
Dept: PRIMARY CARE CLINIC | Age: 68
End: 2023-10-02

## 2023-10-02 NOTE — TELEPHONE ENCOUNTER
Gave verbal order for skilled nursing home care & physical therapy; provider will fax over order to [phone].

## 2023-10-03 NOTE — PROGRESS NOTES
Bed: 24  Expected date:   Expected time:   Means of arrival:   Comments:   Pt complains of headache and chest pain. Rates headache 10/10. Perfect serve message sent to Dr. Sana Arango. Obtained a new order for PRN Nitro. Administered PRN Nitro x1 and Tylenol per order. Pt states that her chest pain has \"eased up\" after receiving the Nitro. She denies the need to take an additional dose at this time. Will continue to monitor.

## 2023-10-11 DIAGNOSIS — I10 ESSENTIAL HYPERTENSION: ICD-10-CM

## 2023-10-11 RX ORDER — CARVEDILOL 6.25 MG/1
6.25 TABLET ORAL 2 TIMES DAILY WITH MEALS
Qty: 60 TABLET | Refills: 3 | Status: SHIPPED | OUTPATIENT
Start: 2023-10-11

## 2023-10-11 NOTE — TELEPHONE ENCOUNTER
Medication:   Requested Prescriptions     Pending Prescriptions Disp Refills    carvedilol (COREG) 6.25 MG tablet [Pharmacy Med Name: CARVEDILOL 6.25 MG TABS 6.25 Tablet] 60 tablet 3     Sig: TAKE 1 TABLET BY MOUTH 2 TIMES DAILY (WITH MEALS)        Last Filled:      Patient Phone Number: [phone] (home)     Last appt: 7/21/2023   Next appt: Visit date not found    Last OARRS:       3/30/2023     1:54 PM   RX Monitoring   Periodic Controlled Substance Monitoring Possible medication side effects, risk of tolerance/dependence & alternative treatments discussed. ;No signs of potential drug abuse or diversion identified.

## 2023-10-30 RX ORDER — ALCOHOL ANTISEPTIC PADS
PADS, MEDICATED (EA) TOPICAL
Qty: 90 EACH | Refills: 3 | Status: SHIPPED | OUTPATIENT
Start: 2023-10-30

## 2023-10-30 NOTE — TELEPHONE ENCOUNTER
Medication:   Requested Prescriptions     Pending Prescriptions Disp Refills    [employer] Alcohol Swabs 70 % PADS [Pharmacy Med Name: alcohol swabs]  3     Sig: USE TO TEST BLOOD GLUCOSE TWICE DAILY        Last Filled:      Patient Phone Number: [phone] (home)     Last appt: 7/21/2023   Next appt: Visit date not found    Last OARRS:       3/30/2023     1:54 PM   RX Monitoring   Periodic Controlled Substance Monitoring Possible medication side effects, risk of tolerance/dependence & alternative treatments discussed. ;No signs of potential drug abuse or diversion identified.

## 2023-11-21 ENCOUNTER — TELEPHONE (OUTPATIENT)
Dept: PRIMARY CARE CLINIC | Age: 68
End: 2023-11-21

## 2023-12-10 DIAGNOSIS — I10 ESSENTIAL HYPERTENSION: ICD-10-CM

## 2023-12-11 ENCOUNTER — TELEPHONE (OUTPATIENT)
Dept: PRIMARY CARE CLINIC | Age: 68
End: 2023-12-11

## 2023-12-11 RX ORDER — FUROSEMIDE 40 MG/1
40 TABLET ORAL DAILY
Qty: 30 TABLET | Refills: 0 | Status: SHIPPED | OUTPATIENT
Start: 2023-12-11

## 2023-12-11 NOTE — TELEPHONE ENCOUNTER
Medication:   Requested Prescriptions     Pending Prescriptions Disp Refills    omeprazole (PRILOSEC) 20 MG delayed release capsule [Pharmacy Med Name: OMEPRAZOLE 20 MG CPDR 20 Capsule] 30 capsule 1     Sig: TAKE 1 CAPSULE BY MOUTH DAILY        Last Filled:      Patient Phone Number: [phone] (home)     Last appt: 7/21/2023   Next appt: 12/11/2023    Last OARRS:       3/30/2023     1:54 PM   RX Monitoring   Periodic Controlled Substance Monitoring Possible medication side effects, risk of tolerance/dependence & alternative treatments discussed. ;No signs of potential drug abuse or diversion identified.

## 2023-12-11 NOTE — TELEPHONE ENCOUNTER
Dorys from [facility] called to report patient's BP today as 170/88. Please advise.

## 2023-12-12 RX ORDER — OMEPRAZOLE 20 MG/1
20 CAPSULE, DELAYED RELEASE ORAL DAILY
Qty: 30 CAPSULE | Refills: 1 | Status: SHIPPED | OUTPATIENT
Start: 2023-12-12

## 2024-01-09 DIAGNOSIS — I10 ESSENTIAL HYPERTENSION: ICD-10-CM

## 2024-01-09 RX ORDER — NIFEDIPINE 30 MG/1
30 TABLET, EXTENDED RELEASE ORAL DAILY
Qty: 30 TABLET | Refills: 3 | Status: SHIPPED | OUTPATIENT
Start: 2024-01-09

## 2024-01-09 NOTE — TELEPHONE ENCOUNTER
Medication:   Requested Prescriptions     Pending Prescriptions Disp Refills    NIFEdipine (PROCARDIA XL) 30 MG extended release tablet [Pharmacy Med Name: NIFEDIPINE ER OSMOTIC 30MG 30 Tablet] 30 tablet 3     Sig: TAKE 1 TABLET BY MOUTH DAILY        Last Filled:  12/10/2023     Patient Phone Number: [phone] (home)     Last appt: 7/21/2023   Next appt: Visit date not found

## 2024-01-15 ENCOUNTER — TELEPHONE (OUTPATIENT)
Dept: PRIMARY CARE CLINIC | Age: 69
End: 2024-01-15

## 2024-01-15 NOTE — TELEPHONE ENCOUNTER
OK for home care? Made an appointment Friday 01/19/2024    ----- Message from Florinda Green sent at 1/12/2024 12:38 PM EST -----  Subject: Message to Provider    QUESTIONS  Information for Provider? Liza from [facility] called and would like to   know if you are still willing to follow and sign home care orders, please   call   ---------------------------------------------------------------------------  --------------  CALL BACK INFO  [phone]; OK to leave message on voicemail  ---------------------------------------------------------------------------  --------------  SCRIPT ANSWERS  Relationship to Patient? Covered Entity  Covered Entity Type? Home Health Care?  Representative Name? Liza

## 2024-01-24 NOTE — PROGRESS NOTES
Patient given verbal and written discharge instructions. Denies questions or concerns. Daughter to provide a ride home today and will be here around 1900. Bleeding that does not stop/Swelling that gets worse/Pain not relieved by Medications/Fever greater than (need to indicate Fahrenheit or Celsius)

## 2024-03-07 ENCOUNTER — TELEPHONE (OUTPATIENT)
Dept: PRIMARY CARE CLINIC | Age: 69
End: 2024-03-07

## 2024-03-07 NOTE — TELEPHONE ENCOUNTER
----- Message from Kiesha Schmidt sent at 3/7/2024 11:31 AM EST -----  Subject: Message to Provider    QUESTIONS  Information for Provider? Zainab from [facility] called in   regards to Pt. Zainab stated Pt's BS is 505, BP is 170/80, Pt has a   headache, however that is normal for Pt. Zainab recertified her, and   wants to verify if Dr. Cardenas will continue to follow her. Zainab also   stated Pt took 8 units of Novolg with breakfast w/ an additional 4. Pt was   instructed to go to ED, Pt refused. Please reach out to Zainab with any   questions [phone]  ---------------------------------------------------------------------------  --------------  CALL BACK INFO  [phone]; OK to leave message on voicemail  ---------------------------------------------------------------------------  --------------  SCRIPT ANSWERS  Relationship to Patient? Covered Entity  Covered Entity Type? Home Health Care?  Representative Name? Danny Menard [facility]

## 2024-03-12 ENCOUNTER — TELEPHONE (OUTPATIENT)
Dept: PRIMARY CARE CLINIC | Age: 69
End: 2024-03-12

## 2024-03-12 NOTE — TELEPHONE ENCOUNTER
Carol, a  with [employer] called in on behalf of patient requesting an order to be faxed to them for PT to receive bed pads for incontinence.     Please fax to: [phone]    Best call back number: [phone]

## 2024-03-26 ENCOUNTER — HOSPITAL ENCOUNTER (INPATIENT)
Age: 69
LOS: 9 days | Discharge: HOME | DRG: 193 | End: 2024-04-05
Attending: EMERGENCY MEDICINE | Admitting: INTERNAL MEDICINE
Payer: COMMERCIAL

## 2024-03-26 ENCOUNTER — APPOINTMENT (OUTPATIENT)
Dept: GENERAL RADIOLOGY | Age: 69
DRG: 193 | End: 2024-03-26
Payer: COMMERCIAL

## 2024-03-26 DIAGNOSIS — R07.9 CHEST PAIN, UNSPECIFIED TYPE: Primary | ICD-10-CM

## 2024-03-26 DIAGNOSIS — R79.89 ELEVATED TROPONIN: ICD-10-CM

## 2024-03-26 DIAGNOSIS — N17.9 ACUTE KIDNEY INJURY SUPERIMPOSED ON CHRONIC KIDNEY DISEASE (HCC): ICD-10-CM

## 2024-03-26 DIAGNOSIS — Z79.4 TYPE 2 DIABETES MELLITUS WITH OTHER CIRCULATORY COMPLICATION, WITH LONG-TERM CURRENT USE OF INSULIN (HCC): ICD-10-CM

## 2024-03-26 DIAGNOSIS — N18.9 ACUTE KIDNEY INJURY SUPERIMPOSED ON CHRONIC KIDNEY DISEASE (HCC): ICD-10-CM

## 2024-03-26 DIAGNOSIS — E11.59 TYPE 2 DIABETES MELLITUS WITH OTHER CIRCULATORY COMPLICATION, WITH LONG-TERM CURRENT USE OF INSULIN (HCC): ICD-10-CM

## 2024-03-26 LAB
ALBUMIN SERPL-MCNC: 3.3 G/DL (ref 3.4–5)
ALBUMIN/GLOB SERPL: 0.8 {RATIO} (ref 1.1–2.2)
ALP SERPL-CCNC: 127 U/L (ref 40–129)
ALT SERPL-CCNC: <5 U/L (ref 10–40)
ANION GAP SERPL CALCULATED.3IONS-SCNC: 13 MMOL/L (ref 3–16)
ANTI-XA UNFRAC HEPARIN: 0.73 IU/ML (ref 0.3–0.7)
APTT BLD: 28.1 SEC (ref 22.7–35.9)
AST SERPL-CCNC: 15 U/L (ref 15–37)
BASOPHILS # BLD: 0 K/UL (ref 0–0.2)
BASOPHILS NFR BLD: 0.9 %
BILIRUB SERPL-MCNC: 0.3 MG/DL (ref 0–1)
BUN SERPL-MCNC: 33 MG/DL (ref 7–20)
CALCIUM SERPL-MCNC: 8.9 MG/DL (ref 8.3–10.6)
CHLORIDE SERPL-SCNC: 100 MMOL/L (ref 99–110)
CO2 SERPL-SCNC: 22 MMOL/L (ref 21–32)
CREAT SERPL-MCNC: 2.1 MG/DL (ref 0.6–1.2)
D DIMER: 1.21 UG/ML FEU (ref 0–0.6)
DEPRECATED RDW RBC AUTO: 15.9 % (ref 12.4–15.4)
EOSINOPHIL # BLD: 0.1 K/UL (ref 0–0.6)
EOSINOPHIL NFR BLD: 1.1 %
GFR SERPLBLD CREATININE-BSD FMLA CKD-EPI: 25 ML/MIN/{1.73_M2}
GLUCOSE BLD-MCNC: 114 MG/DL (ref 70–99)
GLUCOSE SERPL-MCNC: 246 MG/DL (ref 70–99)
HCT VFR BLD AUTO: 28.2 % (ref 36–48)
HGB BLD-MCNC: 9.3 G/DL (ref 12–16)
LYMPHOCYTES # BLD: 0.9 K/UL (ref 1–5.1)
LYMPHOCYTES NFR BLD: 16 %
MAGNESIUM SERPL-MCNC: 2 MG/DL (ref 1.8–2.4)
MCH RBC QN AUTO: 30.5 PG (ref 26–34)
MCHC RBC AUTO-ENTMCNC: 32.8 G/DL (ref 31–36)
MCV RBC AUTO: 92.8 FL (ref 80–100)
MONOCYTES # BLD: 0.3 K/UL (ref 0–1.3)
MONOCYTES NFR BLD: 5.2 %
NEUTROPHILS # BLD: 4.5 K/UL (ref 1.7–7.7)
NEUTROPHILS NFR BLD: 76.8 %
NT-PROBNP SERPL-MCNC: 1544 PG/ML (ref 0–124)
PERFORMED ON: ABNORMAL
PHOSPHATE SERPL-MCNC: 3.5 MG/DL (ref 2.5–4.9)
PLATELET # BLD AUTO: 389 K/UL (ref 135–450)
PMV BLD AUTO: 7.2 FL (ref 5–10.5)
POTASSIUM SERPL-SCNC: 4.2 MMOL/L (ref 3.5–5.1)
PROT SERPL-MCNC: 7.2 G/DL (ref 6.4–8.2)
RBC # BLD AUTO: 3.04 M/UL (ref 4–5.2)
SODIUM SERPL-SCNC: 135 MMOL/L (ref 136–145)
TROPONIN, HIGH SENSITIVITY: 36 NG/L (ref 0–14)
TROPONIN, HIGH SENSITIVITY: 41 NG/L (ref 0–14)
WBC # BLD AUTO: 5.8 K/UL (ref 4–11)

## 2024-03-26 PROCEDURE — 96376 TX/PRO/DX INJ SAME DRUG ADON: CPT

## 2024-03-26 PROCEDURE — 6370000000 HC RX 637 (ALT 250 FOR IP): Performed by: EMERGENCY MEDICINE

## 2024-03-26 PROCEDURE — 84100 ASSAY OF PHOSPHORUS: CPT

## 2024-03-26 PROCEDURE — 6360000002 HC RX W HCPCS: Performed by: INTERNAL MEDICINE

## 2024-03-26 PROCEDURE — 6360000002 HC RX W HCPCS: Performed by: NURSE PRACTITIONER

## 2024-03-26 PROCEDURE — 94760 N-INVAS EAR/PLS OXIMETRY 1: CPT

## 2024-03-26 PROCEDURE — 93005 ELECTROCARDIOGRAM TRACING: CPT | Performed by: EMERGENCY MEDICINE

## 2024-03-26 PROCEDURE — 2580000003 HC RX 258: Performed by: INTERNAL MEDICINE

## 2024-03-26 PROCEDURE — 96366 THER/PROPH/DIAG IV INF ADDON: CPT

## 2024-03-26 PROCEDURE — 2580000003 HC RX 258

## 2024-03-26 PROCEDURE — 96375 TX/PRO/DX INJ NEW DRUG ADDON: CPT

## 2024-03-26 PROCEDURE — 96374 THER/PROPH/DIAG INJ IV PUSH: CPT

## 2024-03-26 PROCEDURE — 83880 ASSAY OF NATRIURETIC PEPTIDE: CPT

## 2024-03-26 PROCEDURE — 85730 THROMBOPLASTIN TIME PARTIAL: CPT

## 2024-03-26 PROCEDURE — 6360000002 HC RX W HCPCS: Performed by: EMERGENCY MEDICINE

## 2024-03-26 PROCEDURE — 96368 THER/DIAG CONCURRENT INF: CPT

## 2024-03-26 PROCEDURE — G0378 HOSPITAL OBSERVATION PER HR: HCPCS

## 2024-03-26 PROCEDURE — 85025 COMPLETE CBC W/AUTO DIFF WBC: CPT

## 2024-03-26 PROCEDURE — 85520 HEPARIN ASSAY: CPT

## 2024-03-26 PROCEDURE — 96361 HYDRATE IV INFUSION ADD-ON: CPT

## 2024-03-26 PROCEDURE — 96365 THER/PROPH/DIAG IV INF INIT: CPT

## 2024-03-26 PROCEDURE — 80053 COMPREHEN METABOLIC PANEL: CPT

## 2024-03-26 PROCEDURE — 94640 AIRWAY INHALATION TREATMENT: CPT

## 2024-03-26 PROCEDURE — 83735 ASSAY OF MAGNESIUM: CPT

## 2024-03-26 PROCEDURE — 84484 ASSAY OF TROPONIN QUANT: CPT

## 2024-03-26 PROCEDURE — 36415 COLL VENOUS BLD VENIPUNCTURE: CPT

## 2024-03-26 PROCEDURE — 6370000000 HC RX 637 (ALT 250 FOR IP): Performed by: INTERNAL MEDICINE

## 2024-03-26 PROCEDURE — 71045 X-RAY EXAM CHEST 1 VIEW: CPT

## 2024-03-26 PROCEDURE — 2580000003 HC RX 258: Performed by: EMERGENCY MEDICINE

## 2024-03-26 PROCEDURE — 85379 FIBRIN DEGRADATION QUANT: CPT

## 2024-03-26 PROCEDURE — 2500000003 HC RX 250 WO HCPCS: Performed by: INTERNAL MEDICINE

## 2024-03-26 PROCEDURE — 96367 TX/PROPH/DG ADDL SEQ IV INF: CPT

## 2024-03-26 PROCEDURE — 99285 EMERGENCY DEPT VISIT HI MDM: CPT

## 2024-03-26 RX ORDER — ALBUTEROL SULFATE 2.5 MG/3ML
2.5 SOLUTION RESPIRATORY (INHALATION) EVERY 6 HOURS PRN
Status: DISCONTINUED | OUTPATIENT
Start: 2024-03-26 | End: 2024-03-26

## 2024-03-26 RX ORDER — ACETAMINOPHEN 325 MG/1
650 TABLET ORAL EVERY 4 HOURS PRN
Status: DISCONTINUED | OUTPATIENT
Start: 2024-03-26 | End: 2024-03-26

## 2024-03-26 RX ORDER — ONDANSETRON 2 MG/ML
4 INJECTION INTRAMUSCULAR; INTRAVENOUS ONCE
Status: COMPLETED | OUTPATIENT
Start: 2024-03-26 | End: 2024-03-26

## 2024-03-26 RX ORDER — ACETAMINOPHEN 650 MG/1
650 SUPPOSITORY RECTAL EVERY 6 HOURS PRN
Status: DISCONTINUED | OUTPATIENT
Start: 2024-03-26 | End: 2024-04-05 | Stop reason: HOSPADM

## 2024-03-26 RX ORDER — GLUCAGON 1 MG/ML
1 KIT INJECTION PRN
Status: DISCONTINUED | OUTPATIENT
Start: 2024-03-26 | End: 2024-04-05 | Stop reason: HOSPADM

## 2024-03-26 RX ORDER — METHYLPREDNISOLONE SODIUM SUCCINATE 125 MG/2ML
60 INJECTION, POWDER, LYOPHILIZED, FOR SOLUTION INTRAMUSCULAR; INTRAVENOUS ONCE
Status: COMPLETED | OUTPATIENT
Start: 2024-03-26 | End: 2024-03-26

## 2024-03-26 RX ORDER — QUETIAPINE FUMARATE 25 MG/1
50 TABLET, FILM COATED ORAL NIGHTLY PRN
Status: DISCONTINUED | OUTPATIENT
Start: 2024-03-26 | End: 2024-04-05 | Stop reason: HOSPADM

## 2024-03-26 RX ORDER — SODIUM CHLORIDE 0.9 % (FLUSH) 0.9 %
5-40 SYRINGE (ML) INJECTION PRN
Status: DISCONTINUED | OUTPATIENT
Start: 2024-03-26 | End: 2024-04-03

## 2024-03-26 RX ORDER — LABETALOL HYDROCHLORIDE 5 MG/ML
10 INJECTION, SOLUTION INTRAVENOUS EVERY 4 HOURS PRN
Status: DISCONTINUED | OUTPATIENT
Start: 2024-03-26 | End: 2024-04-05 | Stop reason: HOSPADM

## 2024-03-26 RX ORDER — INSULIN GLARGINE 100 [IU]/ML
10 INJECTION, SOLUTION SUBCUTANEOUS 2 TIMES DAILY
Status: DISCONTINUED | OUTPATIENT
Start: 2024-03-26 | End: 2024-03-31

## 2024-03-26 RX ORDER — SODIUM CHLORIDE 0.9 % (FLUSH) 0.9 %
10 SYRINGE (ML) INJECTION EVERY 12 HOURS SCHEDULED
Status: DISCONTINUED | OUTPATIENT
Start: 2024-03-26 | End: 2024-03-26

## 2024-03-26 RX ORDER — TRAMADOL HYDROCHLORIDE 50 MG/1
TABLET ORAL
COMMUNITY

## 2024-03-26 RX ORDER — ATORVASTATIN CALCIUM 80 MG/1
80 TABLET, FILM COATED ORAL NIGHTLY
Status: DISCONTINUED | OUTPATIENT
Start: 2024-03-26 | End: 2024-04-05 | Stop reason: HOSPADM

## 2024-03-26 RX ORDER — INSULIN LISPRO 100 [IU]/ML
0-4 INJECTION, SOLUTION INTRAVENOUS; SUBCUTANEOUS NIGHTLY
Status: DISCONTINUED | OUTPATIENT
Start: 2024-03-26 | End: 2024-03-26 | Stop reason: ALTCHOICE

## 2024-03-26 RX ORDER — SODIUM CHLORIDE 0.9 % (FLUSH) 0.9 %
10 SYRINGE (ML) INJECTION PRN
Status: DISCONTINUED | OUTPATIENT
Start: 2024-03-26 | End: 2024-03-26

## 2024-03-26 RX ORDER — ACETAMINOPHEN 325 MG/1
650 TABLET ORAL EVERY 6 HOURS PRN
Status: DISCONTINUED | OUTPATIENT
Start: 2024-03-26 | End: 2024-04-05 | Stop reason: HOSPADM

## 2024-03-26 RX ORDER — LABETALOL 200 MG/1
TABLET, FILM COATED ORAL
Status: ON HOLD | COMMUNITY
End: 2024-03-27

## 2024-03-26 RX ORDER — BUSPIRONE HYDROCHLORIDE 5 MG/1
5 TABLET ORAL 2 TIMES DAILY
COMMUNITY
Start: 2023-07-10

## 2024-03-26 RX ORDER — HEPARIN SODIUM 1000 [USP'U]/ML
3200 INJECTION, SOLUTION INTRAVENOUS; SUBCUTANEOUS ONCE
Status: COMPLETED | OUTPATIENT
Start: 2024-03-26 | End: 2024-03-26

## 2024-03-26 RX ORDER — MORPHINE SULFATE 4 MG/ML
4 INJECTION, SOLUTION INTRAMUSCULAR; INTRAVENOUS ONCE
Status: COMPLETED | OUTPATIENT
Start: 2024-03-26 | End: 2024-03-26

## 2024-03-26 RX ORDER — ACETAMINOPHEN 650 MG/1
650 SUPPOSITORY RECTAL EVERY 4 HOURS PRN
Status: DISCONTINUED | OUTPATIENT
Start: 2024-03-26 | End: 2024-03-26

## 2024-03-26 RX ORDER — NIFEDIPINE 30 MG/1
30 TABLET, EXTENDED RELEASE ORAL DAILY
Status: DISCONTINUED | OUTPATIENT
Start: 2024-03-27 | End: 2024-04-01

## 2024-03-26 RX ORDER — POLYETHYLENE GLYCOL 3350 17 G/17G
17 POWDER, FOR SOLUTION ORAL DAILY PRN
Status: DISCONTINUED | OUTPATIENT
Start: 2024-03-26 | End: 2024-03-29

## 2024-03-26 RX ORDER — NITROGLYCERIN 0.4 MG/1
0.4 TABLET SUBLINGUAL EVERY 5 MIN PRN
Status: DISCONTINUED | OUTPATIENT
Start: 2024-03-26 | End: 2024-04-05 | Stop reason: HOSPADM

## 2024-03-26 RX ORDER — SODIUM CHLORIDE 9 MG/ML
INJECTION, SOLUTION INTRAVENOUS PRN
Status: DISCONTINUED | OUTPATIENT
Start: 2024-03-26 | End: 2024-03-26

## 2024-03-26 RX ORDER — LOSARTAN POTASSIUM 25 MG/1
25 TABLET ORAL DAILY
Status: ON HOLD | COMMUNITY
Start: 2023-11-27 | End: 2024-03-27

## 2024-03-26 RX ORDER — HEPARIN SODIUM 1000 [USP'U]/ML
30 INJECTION, SOLUTION INTRAVENOUS; SUBCUTANEOUS PRN
Status: DISCONTINUED | OUTPATIENT
Start: 2024-03-26 | End: 2024-03-26

## 2024-03-26 RX ORDER — SODIUM CHLORIDE 0.9 % (FLUSH) 0.9 %
5-40 SYRINGE (ML) INJECTION EVERY 12 HOURS SCHEDULED
Status: DISCONTINUED | OUTPATIENT
Start: 2024-03-26 | End: 2024-04-03

## 2024-03-26 RX ORDER — ENOXAPARIN SODIUM 100 MG/ML
30 INJECTION SUBCUTANEOUS
Status: DISCONTINUED | OUTPATIENT
Start: 2024-03-26 | End: 2024-03-26

## 2024-03-26 RX ORDER — PREDNISONE 20 MG/1
40 TABLET ORAL DAILY
Status: COMPLETED | OUTPATIENT
Start: 2024-03-27 | End: 2024-03-31

## 2024-03-26 RX ORDER — ASPIRIN 81 MG/1
81 TABLET, CHEWABLE ORAL DAILY
Status: DISCONTINUED | OUTPATIENT
Start: 2024-03-27 | End: 2024-04-05 | Stop reason: HOSPADM

## 2024-03-26 RX ORDER — INSULIN LISPRO 100 [IU]/ML
0-8 INJECTION, SOLUTION INTRAVENOUS; SUBCUTANEOUS EVERY 4 HOURS
Status: DISCONTINUED | OUTPATIENT
Start: 2024-03-25 | End: 2024-03-27

## 2024-03-26 RX ORDER — BUPROPION HYDROCHLORIDE 150 MG/1
150 TABLET, EXTENDED RELEASE ORAL 2 TIMES DAILY
Status: DISCONTINUED | OUTPATIENT
Start: 2024-03-26 | End: 2024-04-03

## 2024-03-26 RX ORDER — SODIUM CHLORIDE 9 MG/ML
INJECTION, SOLUTION INTRAVENOUS PRN
Status: DISCONTINUED | OUTPATIENT
Start: 2024-03-26 | End: 2024-04-03

## 2024-03-26 RX ORDER — RIMEGEPANT SULFATE 75 MG/75MG
TABLET, ORALLY DISINTEGRATING ORAL
COMMUNITY

## 2024-03-26 RX ORDER — IPRATROPIUM BROMIDE AND ALBUTEROL SULFATE 2.5; .5 MG/3ML; MG/3ML
1 SOLUTION RESPIRATORY (INHALATION)
Status: DISCONTINUED | OUTPATIENT
Start: 2024-03-26 | End: 2024-03-26

## 2024-03-26 RX ORDER — ALBUTEROL SULFATE 2.5 MG/3ML
2.5 SOLUTION RESPIRATORY (INHALATION)
Status: DISCONTINUED | OUTPATIENT
Start: 2024-03-26 | End: 2024-04-05 | Stop reason: HOSPADM

## 2024-03-26 RX ORDER — HEPARIN SODIUM 10000 [USP'U]/100ML
0-4000 INJECTION, SOLUTION INTRAVENOUS CONTINUOUS
Status: DISCONTINUED | OUTPATIENT
Start: 2024-03-26 | End: 2024-03-26

## 2024-03-26 RX ORDER — MORPHINE SULFATE 4 MG/ML
4 INJECTION, SOLUTION INTRAMUSCULAR; INTRAVENOUS EVERY 4 HOURS PRN
Status: DISCONTINUED | OUTPATIENT
Start: 2024-03-26 | End: 2024-04-05 | Stop reason: HOSPADM

## 2024-03-26 RX ORDER — DEXTROSE MONOHYDRATE 100 MG/ML
INJECTION, SOLUTION INTRAVENOUS CONTINUOUS PRN
Status: DISCONTINUED | OUTPATIENT
Start: 2024-03-26 | End: 2024-04-05 | Stop reason: HOSPADM

## 2024-03-26 RX ORDER — WATER 10 ML/10ML
INJECTION INTRAMUSCULAR; INTRAVENOUS; SUBCUTANEOUS
Status: COMPLETED
Start: 2024-03-26 | End: 2024-03-26

## 2024-03-26 RX ORDER — HEPARIN SODIUM 1000 [USP'U]/ML
60 INJECTION, SOLUTION INTRAVENOUS; SUBCUTANEOUS PRN
Status: DISCONTINUED | OUTPATIENT
Start: 2024-03-26 | End: 2024-03-26

## 2024-03-26 RX ORDER — 0.9 % SODIUM CHLORIDE 0.9 %
500 INTRAVENOUS SOLUTION INTRAVENOUS ONCE
Status: COMPLETED | OUTPATIENT
Start: 2024-03-26 | End: 2024-03-26

## 2024-03-26 RX ORDER — HEPARIN SODIUM 10000 [USP'U]/100ML
0-4000 INJECTION, SOLUTION INTRAVENOUS CONTINUOUS
Status: DISCONTINUED | OUTPATIENT
Start: 2024-03-26 | End: 2024-03-27

## 2024-03-26 RX ORDER — INSULIN LISPRO 100 [IU]/ML
0-8 INJECTION, SOLUTION INTRAVENOUS; SUBCUTANEOUS
Status: DISCONTINUED | OUTPATIENT
Start: 2024-03-27 | End: 2024-03-26 | Stop reason: ALTCHOICE

## 2024-03-26 RX ORDER — PANTOPRAZOLE SODIUM 40 MG/1
40 TABLET, DELAYED RELEASE ORAL
Status: DISCONTINUED | OUTPATIENT
Start: 2024-03-27 | End: 2024-04-05 | Stop reason: HOSPADM

## 2024-03-26 RX ORDER — AMITRIPTYLINE HYDROCHLORIDE 10 MG/1
40 TABLET, FILM COATED ORAL NIGHTLY
Status: DISCONTINUED | OUTPATIENT
Start: 2024-03-26 | End: 2024-04-05 | Stop reason: HOSPADM

## 2024-03-26 RX ORDER — NITROGLYCERIN 0.4 MG/1
0.4 TABLET SUBLINGUAL ONCE
Status: COMPLETED | OUTPATIENT
Start: 2024-03-26 | End: 2024-03-26

## 2024-03-26 RX ORDER — CYCLOBENZAPRINE HCL 5 MG
5 TABLET ORAL 3 TIMES DAILY PRN
COMMUNITY

## 2024-03-26 RX ORDER — CARVEDILOL 6.25 MG/1
6.25 TABLET ORAL 2 TIMES DAILY WITH MEALS
Status: DISCONTINUED | OUTPATIENT
Start: 2024-03-26 | End: 2024-03-31

## 2024-03-26 RX ORDER — ONDANSETRON 2 MG/ML
4 INJECTION INTRAMUSCULAR; INTRAVENOUS EVERY 4 HOURS PRN
Status: DISCONTINUED | OUTPATIENT
Start: 2024-03-26 | End: 2024-03-29

## 2024-03-26 RX ORDER — NITROGLYCERIN 20 MG/100ML
5-200 INJECTION INTRAVENOUS CONTINUOUS
Status: DISCONTINUED | OUTPATIENT
Start: 2024-03-26 | End: 2024-03-26

## 2024-03-26 RX ORDER — ASPIRIN 81 MG/1
243 TABLET, CHEWABLE ORAL ONCE
Status: COMPLETED | OUTPATIENT
Start: 2024-03-26 | End: 2024-03-26

## 2024-03-26 RX ORDER — RANOLAZINE 500 MG/1
1000 TABLET, EXTENDED RELEASE ORAL 2 TIMES DAILY
Status: DISCONTINUED | OUTPATIENT
Start: 2024-03-26 | End: 2024-04-05 | Stop reason: HOSPADM

## 2024-03-26 RX ORDER — MONTELUKAST SODIUM 10 MG/1
10 TABLET ORAL DAILY
Status: DISCONTINUED | OUTPATIENT
Start: 2024-03-27 | End: 2024-04-05 | Stop reason: HOSPADM

## 2024-03-26 RX ORDER — POLYETHYLENE GLYCOL 3350 17 G/17G
17 POWDER, FOR SOLUTION ORAL DAILY PRN
Status: DISCONTINUED | OUTPATIENT
Start: 2024-03-26 | End: 2024-03-26

## 2024-03-26 RX ORDER — IPRATROPIUM BROMIDE AND ALBUTEROL SULFATE 2.5; .5 MG/3ML; MG/3ML
1 SOLUTION RESPIRATORY (INHALATION) EVERY 4 HOURS PRN
Status: DISCONTINUED | OUTPATIENT
Start: 2024-03-26 | End: 2024-04-05 | Stop reason: HOSPADM

## 2024-03-26 RX ADMIN — INSULIN GLARGINE 10 UNITS: 100 INJECTION, SOLUTION SUBCUTANEOUS at 21:51

## 2024-03-26 RX ADMIN — BUPROPION HYDROCHLORIDE 150 MG: 150 TABLET, EXTENDED RELEASE ORAL at 21:50

## 2024-03-26 RX ADMIN — QUETIAPINE FUMARATE 50 MG: 25 TABLET ORAL at 22:47

## 2024-03-26 RX ADMIN — AMITRIPTYLINE HYDROCHLORIDE 40 MG: 10 TABLET, FILM COATED ORAL at 21:50

## 2024-03-26 RX ADMIN — MOMETASONE FUROATE AND FORMOTEROL FUMARATE DIHYDRATE 2 PUFF: 200; 5 AEROSOL RESPIRATORY (INHALATION) at 20:29

## 2024-03-26 RX ADMIN — MORPHINE SULFATE 4 MG: 4 INJECTION, SOLUTION INTRAMUSCULAR; INTRAVENOUS at 23:17

## 2024-03-26 RX ADMIN — NITROGLYCERIN 5 MCG/MIN: 20 INJECTION INTRAVENOUS at 15:21

## 2024-03-26 RX ADMIN — HEPARIN SODIUM 640 UNITS/HR: 10000 INJECTION, SOLUTION INTRAVENOUS at 20:21

## 2024-03-26 RX ADMIN — CARVEDILOL 6.25 MG: 6.25 TABLET, FILM COATED ORAL at 21:53

## 2024-03-26 RX ADMIN — HEPARIN SODIUM AND DEXTROSE 640 UNITS/HR: 10000; 5 INJECTION INTRAVENOUS at 19:54

## 2024-03-26 RX ADMIN — MORPHINE SULFATE 4 MG: 4 INJECTION, SOLUTION INTRAMUSCULAR; INTRAVENOUS at 14:50

## 2024-03-26 RX ADMIN — ATORVASTATIN CALCIUM 80 MG: 80 TABLET, FILM COATED ORAL at 21:50

## 2024-03-26 RX ADMIN — ASPIRIN 81 MG 243 MG: 81 TABLET ORAL at 12:52

## 2024-03-26 RX ADMIN — HEPARIN SODIUM AND DEXTROSE 640 UNITS/HR: 10000; 5 INJECTION INTRAVENOUS at 15:57

## 2024-03-26 RX ADMIN — ONDANSETRON 4 MG: 2 INJECTION INTRAMUSCULAR; INTRAVENOUS at 13:46

## 2024-03-26 RX ADMIN — METHYLPREDNISOLONE SODIUM SUCCINATE 60 MG: 125 INJECTION INTRAMUSCULAR; INTRAVENOUS at 21:50

## 2024-03-26 RX ADMIN — HEPARIN SODIUM 3200 UNITS: 1000 INJECTION INTRAVENOUS; SUBCUTANEOUS at 15:54

## 2024-03-26 RX ADMIN — SODIUM CHLORIDE, PRESERVATIVE FREE 10 ML: 5 INJECTION INTRAVENOUS at 21:53

## 2024-03-26 RX ADMIN — HEPARIN SODIUM 590 UNITS/HR: 10000 INJECTION, SOLUTION INTRAVENOUS at 22:46

## 2024-03-26 RX ADMIN — MORPHINE SULFATE 4 MG: 4 INJECTION, SOLUTION INTRAMUSCULAR; INTRAVENOUS at 17:45

## 2024-03-26 RX ADMIN — RANOLAZINE 1000 MG: 500 TABLET, FILM COATED, EXTENDED RELEASE ORAL at 21:50

## 2024-03-26 RX ADMIN — WATER 1 ML: 1 INJECTION INTRAMUSCULAR; INTRAVENOUS; SUBCUTANEOUS at 21:51

## 2024-03-26 RX ADMIN — NITROGLYCERIN 0.4 MG: 0.4 TABLET, ORALLY DISINTEGRATING SUBLINGUAL at 13:25

## 2024-03-26 RX ADMIN — SODIUM CHLORIDE 500 ML: 9 INJECTION, SOLUTION INTRAVENOUS at 13:40

## 2024-03-26 ASSESSMENT — PAIN SCALES - GENERAL
PAINLEVEL_OUTOF10: 7
PAINLEVEL_OUTOF10: 9
PAINLEVEL_OUTOF10: 8
PAINLEVEL_OUTOF10: 6
PAINLEVEL_OUTOF10: 7
PAINLEVEL_OUTOF10: 8
PAINLEVEL_OUTOF10: 5
PAINLEVEL_OUTOF10: 8

## 2024-03-26 ASSESSMENT — PAIN DESCRIPTION - ORIENTATION
ORIENTATION: LEFT

## 2024-03-26 ASSESSMENT — PAIN DESCRIPTION - DESCRIPTORS
DESCRIPTORS: ACHING;SHARP
DESCRIPTORS: SQUEEZING

## 2024-03-26 ASSESSMENT — PAIN DESCRIPTION - PAIN TYPE: TYPE: ACUTE PAIN

## 2024-03-26 ASSESSMENT — PAIN DESCRIPTION - LOCATION
LOCATION: CHEST

## 2024-03-26 ASSESSMENT — PAIN - FUNCTIONAL ASSESSMENT
PAIN_FUNCTIONAL_ASSESSMENT: ACTIVITIES ARE NOT PREVENTED
PAIN_FUNCTIONAL_ASSESSMENT: 0-10

## 2024-03-26 NOTE — ED PROVIDER NOTES
[facility]  EMERGENCY DEPARTMENT ENCOUNTER      Pt Name: Maren Meza  MRN: [id number]  Birthdate 1955  Date of evaluation: 3/26/2024  Provider: HOSSEIN STRINGER DO    CHIEF COMPLAINT       Chief Complaint   Patient presents with    Chest Pain     Patient presents to ED complaining of chest pain which started 2 hours ago. Patient reports having a hearty attack 2 weeks ago and states this feels very similarly. Reports some shortness of breath.         HISTORY OF PRESENT ILLNESS   (Location/Symptom, Timing/Onset, Context/Setting, Quality, Duration, Modifying Factors, Severity)  Note limiting factors.   Maren Meza is a 68 y.o. female who presents to the emergency department with complaint of chest pain that began 2 hours prior to arrival while sitting on the couch.  She describes a squeezing pain in the left anterior chest that radiates to her left shoulder and left arm.  Pain is identical to what she is experienced in the past with her heart.  She reports that she has multiple coronary stents.  Her last angiogram with stent placement was in summer 2023.    She does report that she became short of breath and broke out in a sweat at the onset of her symptoms.  Shortness of breath and diaphoresis have now resolved.  She currently rates her pain an 8/10 intensity    She reports that she was recently hospitalized to [facility] 2 weeks ago for a \"heart attack\" but did not have a cardiac catheterization according to the patient.    She denies any recent fever chills cough cold symptoms.  No nausea vomiting or diarrhea.  She denies any headache earache sore throat or sinus drainage.  She denies any back or flank pain.  No neck or jaw pain.  She denies any syncope or palpitations.    She denies any focal weakness or numbness.  No vision or speech changes.  She does have chronic right-sided weakness in the right arm and right leg from prior stroke.  This is  crackles in the bases.  Upper lung fields were clear.  No conversational dyspnea or accessory muscle use.  Chest: Chest wall non-tender.  No evidence of trauma.  Abdomen:  Soft, nondistended, bowel sounds present.  Nontender no guarding rigidity or rebound. No masses.   Musculoskeletal: Extremities non-tender with full range of motion.  Radial and dorsalis pedis pulses were intact.  No calf tenderness erythema or edema.  Neurological: Alert and oriented x 3.  Speech clear. No facial droop.  No acute focal motor or sensory deficits.  No dysarthria.  No aphasia.  No pronator drift.  Skin: Skin is warm and dry. No rash.   Lymphatic:  No lympadenopathy.    Psychiatric: Normal mood and affect. Behavior is normal.         DIAGNOSTIC RESULTS     EKG: All EKG's are interpreted by me, the Emergency Department Physician, who either signs or Co-signs this chart in the absence of a cardiologist.    Normal sinus rhythm.  Rate 98.  NH interval 126 ms.  QRS duration 92 ms.  QTc 454 ms.  R axis 30 degrees.  There is no ST elevation.  Nonspecific T wave abnormalities.  Baseline artifact was noted.  EKG is similar in appearance to a previous EKG from April 29, 2023.    Repeat EKG at 5:49 PM: Normal sinus rhythm.  Rate 80.  Q waves noted in the anterior septal leads.  QRS duration 86 ms.  NH interval 140 ms.  QTc 449 ms.  R Axis XII degrees.  No ST elevation.  EKG is unchanged from initial tracing.    RADIOLOGY:   Non-plain film images such as CT, Ultrasound and MRI are read by the radiologist. Plain radiographic images are visualized and preliminarily interpreted by the emergency physician with the below findings:        Interpretation per the Radiologist below, if available at the time of this note:    XR CHEST PORTABLE   Final Result   Patchy opacities in the right lung base, favored to represent atelectasis   however airspace disease not excluded.               ED BEDSIDE ULTRASOUND:   Performed by ED Physician -

## 2024-03-26 NOTE — ED NOTES
[facility] EMS at bedside at this time for transfer to [facility] Room 2107. Report given to EMS staff at this time. All questions answered. EMS staff states no further questions.     Nitroglycerin drip paused for transfer with DO permission.

## 2024-03-26 NOTE — ED NOTES
DO notified of [facility] SIRS alert. States no sepsis orders required at this time.

## 2024-03-26 NOTE — PROGRESS NOTES
Clinical Pharmacy Note  Heparin Dosing Consult    Maren Meza is a 68 y.o. female ordered heparin per CAD/STEMI/NSTEMI/UA/AFIB nomogram by Dr. Knox.     No results found for: \"ANTIXAUHEP\", \"LABHEPA\"   Lab Results   Component Value Date/Time    HGB 9.3 03/26/2024 01:25 PM    HCT 28.2 03/26/2024 01:25 PM     03/26/2024 01:25 PM    INR 1.03 06/19/2021 06:31 PM       Ht Readings from Last 1 Encounters:   05/08/23 1.524 m (5')        Wt Readings from Last 1 Encounters:   03/26/24 53.4 kg (117 lb 11.6 oz)        Assessment/Plan:  Initial bolus: 3200 units  Initial infusion rate: 640 units/hr  Next anti-Xa:: 3/26/24 2200     Pharmacy will continue to monitor adjust heparin based on anti-Xa results using nomogram below:     CAD/STEMI/NSTEMI/UA/AFIB Heparin Nomogram     Initial Bolus: 60 units/kg Max Bolus: 4,000 units       Initial Rate: 12 units/kg/hr Max Initial Rate: 1,000 units/hr     anti-Xa Bolus Titration   < 0.1 Heparin 60 units/kg bolus Increase drip by 4 units/kg/hr   0.1 - 0.29 Heparin 30 units/kg bolus Increase drip by 2 units/kg/hr   0.3 - 0.7 No Bolus No Change   0.71 - 0.8 No Bolus Decrease drip by 1 units/kg/hr   0.81 - 0.99 No Bolus Decrease drip by 2 units/kg/hr   > 1 Hold Heparin for 1 hour Decrease drip by 3 units/kg/hr     Obtain anti-Xa 6 hours after initial bolus and 6 hours after any dose change until two consecutive therapeutic anti-Xa levels are achieved - then daily.

## 2024-03-26 NOTE — ED NOTES
ED SBAR report provider to YOLETTE Tompkins. Patient to be transported to Room 2107 via stretcher by  [facility] .Patient transported with bedside cardiac monitor and with IV medications infusing. IV site clean, dry, and intact. MEWS score and pain assessed as 6/10 and documented. Updated patient on plan of care.   EMS en route to [facility] with patient at this time.

## 2024-03-26 NOTE — ED NOTES
Spoke with Janet Pharmacist she verified heparin gtt and nitro gtt can be ran at in the same line

## 2024-03-26 NOTE — ED NOTES
Ultrasound guided PIV established after multiple guided and unguided attempts by multiple staff members.

## 2024-03-26 NOTE — ED NOTES
Report given to YOLETTE Israel at this time, all questions answered. Denies any further questions at this time.

## 2024-03-26 NOTE — ED TRIAGE NOTES
Patient presents to ED complaining of chest pain which started 2 hours ago. Patient reports having a hearty attack 2 weeks ago and states this feels very similarly. Reports some shortness of breath. Patient states she was watching TV when the pain started, states she became very sweaty when it started.    Patient resting on bed, respirations even and easy at this time. No obvious distress.

## 2024-03-27 ENCOUNTER — TELEPHONE (OUTPATIENT)
Dept: CARDIOLOGY CLINIC | Age: 69
End: 2024-03-27

## 2024-03-27 DIAGNOSIS — I73.9 PAD (PERIPHERAL ARTERY DISEASE) (HCC): Primary | ICD-10-CM

## 2024-03-27 LAB
ANION GAP SERPL CALCULATED.3IONS-SCNC: 15 MMOL/L (ref 3–16)
ANION GAP SERPL CALCULATED.3IONS-SCNC: 15 MMOL/L (ref 3–16)
ANTI-XA UNFRAC HEPARIN: 0.63 IU/ML (ref 0.3–0.7)
ANTI-XA UNFRAC HEPARIN: <0.1 IU/ML (ref 0.3–0.7)
BASE EXCESS BLDV CALC-SCNC: -4.8 MMOL/L
BASOPHILS # BLD: 0 K/UL (ref 0–0.2)
BASOPHILS NFR BLD: 0.2 %
BUN SERPL-MCNC: 30 MG/DL (ref 7–20)
BUN SERPL-MCNC: 32 MG/DL (ref 7–20)
CALCIUM SERPL-MCNC: 8.8 MG/DL (ref 8.3–10.6)
CALCIUM SERPL-MCNC: 8.9 MG/DL (ref 8.3–10.6)
CHLORIDE SERPL-SCNC: 101 MMOL/L (ref 99–110)
CHLORIDE SERPL-SCNC: 102 MMOL/L (ref 99–110)
CO2 BLDV-SCNC: 22 MMOL/L
CO2 SERPL-SCNC: 18 MMOL/L (ref 21–32)
CO2 SERPL-SCNC: 20 MMOL/L (ref 21–32)
COHGB MFR BLDV: 3.6 %
CREAT SERPL-MCNC: 1.8 MG/DL (ref 0.6–1.2)
CREAT SERPL-MCNC: 1.9 MG/DL (ref 0.6–1.2)
DEPRECATED RDW RBC AUTO: 15.9 % (ref 12.4–15.4)
EKG ATRIAL RATE: 80 BPM
EKG ATRIAL RATE: 98 BPM
EKG DIAGNOSIS: NORMAL
EKG DIAGNOSIS: NORMAL
EKG P AXIS: 65 DEGREES
EKG P AXIS: 70 DEGREES
EKG P-R INTERVAL: 126 MS
EKG P-R INTERVAL: 140 MS
EKG Q-T INTERVAL: 356 MS
EKG Q-T INTERVAL: 390 MS
EKG QRS DURATION: 86 MS
EKG QRS DURATION: 92 MS
EKG QTC CALCULATION (BAZETT): 449 MS
EKG QTC CALCULATION (BAZETT): 454 MS
EKG R AXIS: 12 DEGREES
EKG R AXIS: 30 DEGREES
EKG T AXIS: -7 DEGREES
EKG T AXIS: 211 DEGREES
EKG VENTRICULAR RATE: 80 BPM
EKG VENTRICULAR RATE: 98 BPM
EOSINOPHIL # BLD: 0 K/UL (ref 0–0.6)
EOSINOPHIL NFR BLD: 0.1 %
FLUAV RNA UPPER RESP QL NAA+PROBE: NEGATIVE
FLUBV AG NPH QL: NEGATIVE
GFR SERPLBLD CREATININE-BSD FMLA CKD-EPI: 29 ML/MIN/{1.73_M2}
GFR SERPLBLD CREATININE-BSD FMLA CKD-EPI: 30 ML/MIN/{1.73_M2}
GLUCOSE BLD-MCNC: 171 MG/DL (ref 70–99)
GLUCOSE BLD-MCNC: 183 MG/DL (ref 70–99)
GLUCOSE BLD-MCNC: 184 MG/DL (ref 70–99)
GLUCOSE BLD-MCNC: 261 MG/DL (ref 70–99)
GLUCOSE BLD-MCNC: 279 MG/DL (ref 70–99)
GLUCOSE BLD-MCNC: 314 MG/DL (ref 70–99)
GLUCOSE BLD-MCNC: 97 MG/DL (ref 70–99)
GLUCOSE SERPL-MCNC: 271 MG/DL (ref 70–99)
GLUCOSE SERPL-MCNC: 272 MG/DL (ref 70–99)
HCO3 BLDV-SCNC: 21 MMOL/L (ref 23–29)
HCT VFR BLD AUTO: 28.8 % (ref 36–48)
HGB BLD-MCNC: 9.4 G/DL (ref 12–16)
LYMPHOCYTES # BLD: 0.5 K/UL (ref 1–5.1)
LYMPHOCYTES NFR BLD: 9.5 %
MAGNESIUM SERPL-MCNC: 2 MG/DL (ref 1.8–2.4)
MCH RBC QN AUTO: 30.7 PG (ref 26–34)
MCHC RBC AUTO-ENTMCNC: 32.7 G/DL (ref 31–36)
MCV RBC AUTO: 93.9 FL (ref 80–100)
METHGB MFR BLDV: 1.2 %
MONOCYTES # BLD: 0 K/UL (ref 0–1.3)
MONOCYTES NFR BLD: 0.8 %
NEUTROPHILS # BLD: 4.4 K/UL (ref 1.7–7.7)
NEUTROPHILS NFR BLD: 89.4 %
O2 THERAPY: ABNORMAL
PCO2 BLDV: 41.3 MMHG (ref 40–50)
PERFORMED ON: ABNORMAL
PERFORMED ON: NORMAL
PH BLDV: 7.32 [PH] (ref 7.35–7.45)
PHOSPHATE SERPL-MCNC: 4 MG/DL (ref 2.5–4.9)
PLATELET # BLD AUTO: 356 K/UL (ref 135–450)
PMV BLD AUTO: 7.5 FL (ref 5–10.5)
PO2 BLDV: 54 MMHG
POC ACT LR: 170 SEC
POTASSIUM SERPL-SCNC: 4.6 MMOL/L (ref 3.5–5.1)
POTASSIUM SERPL-SCNC: 5.7 MMOL/L (ref 3.5–5.1)
PROCALCITONIN SERPL IA-MCNC: 0.12 NG/ML (ref 0–0.15)
RBC # BLD AUTO: 3.07 M/UL (ref 4–5.2)
SAO2 % BLDV: 84 %
SARS-COV-2 RDRP RESP QL NAA+PROBE: NOT DETECTED
SODIUM SERPL-SCNC: 134 MMOL/L (ref 136–145)
SODIUM SERPL-SCNC: 137 MMOL/L (ref 136–145)
TROPONIN, HIGH SENSITIVITY: 38 NG/L (ref 0–14)
WBC # BLD AUTO: 4.9 K/UL (ref 4–11)

## 2024-03-27 PROCEDURE — 36246 INS CATH ABD/L-EXT ART 2ND: CPT

## 2024-03-27 PROCEDURE — 99223 1ST HOSP IP/OBS HIGH 75: CPT | Performed by: STUDENT IN AN ORGANIZED HEALTH CARE EDUCATION/TRAINING PROGRAM

## 2024-03-27 PROCEDURE — 2060000000 HC ICU INTERMEDIATE R&B

## 2024-03-27 PROCEDURE — 87635 SARS-COV-2 COVID-19 AMP PRB: CPT

## 2024-03-27 PROCEDURE — 36415 COLL VENOUS BLD VENIPUNCTURE: CPT

## 2024-03-27 PROCEDURE — 87804 INFLUENZA ASSAY W/OPTIC: CPT

## 2024-03-27 PROCEDURE — 99153 MOD SED SAME PHYS/QHP EA: CPT

## 2024-03-27 PROCEDURE — 93926 LOWER EXTREMITY STUDY: CPT

## 2024-03-27 PROCEDURE — 84484 ASSAY OF TROPONIN QUANT: CPT

## 2024-03-27 PROCEDURE — 85347 COAGULATION TIME ACTIVATED: CPT

## 2024-03-27 PROCEDURE — 6370000000 HC RX 637 (ALT 250 FOR IP)

## 2024-03-27 PROCEDURE — 2709999900 HC NON-CHARGEABLE SUPPLY: Performed by: STUDENT IN AN ORGANIZED HEALTH CARE EDUCATION/TRAINING PROGRAM

## 2024-03-27 PROCEDURE — 83735 ASSAY OF MAGNESIUM: CPT

## 2024-03-27 PROCEDURE — 99152 MOD SED SAME PHYS/QHP 5/>YRS: CPT

## 2024-03-27 PROCEDURE — 96366 THER/PROPH/DIAG IV INF ADDON: CPT

## 2024-03-27 PROCEDURE — 6360000002 HC RX W HCPCS: Performed by: NURSE PRACTITIONER

## 2024-03-27 PROCEDURE — 6360000002 HC RX W HCPCS

## 2024-03-27 PROCEDURE — 4A023N7 MEASUREMENT OF CARDIAC SAMPLING AND PRESSURE, LEFT HEART, PERCUTANEOUS APPROACH: ICD-10-PCS | Performed by: STUDENT IN AN ORGANIZED HEALTH CARE EDUCATION/TRAINING PROGRAM

## 2024-03-27 PROCEDURE — C1760 CLOSURE DEV, VASC: HCPCS | Performed by: STUDENT IN AN ORGANIZED HEALTH CARE EDUCATION/TRAINING PROGRAM

## 2024-03-27 PROCEDURE — 75710 ARTERY X-RAYS ARM/LEG: CPT

## 2024-03-27 PROCEDURE — 93010 ELECTROCARDIOGRAM REPORT: CPT | Performed by: INTERNAL MEDICINE

## 2024-03-27 PROCEDURE — 84100 ASSAY OF PHOSPHORUS: CPT

## 2024-03-27 PROCEDURE — 93458 L HRT ARTERY/VENTRICLE ANGIO: CPT

## 2024-03-27 PROCEDURE — 96376 TX/PRO/DX INJ SAME DRUG ADON: CPT

## 2024-03-27 PROCEDURE — B2111ZZ FLUOROSCOPY OF MULTIPLE CORONARY ARTERIES USING LOW OSMOLAR CONTRAST: ICD-10-PCS | Performed by: STUDENT IN AN ORGANIZED HEALTH CARE EDUCATION/TRAINING PROGRAM

## 2024-03-27 PROCEDURE — 99152 MOD SED SAME PHYS/QHP 5/>YRS: CPT | Performed by: STUDENT IN AN ORGANIZED HEALTH CARE EDUCATION/TRAINING PROGRAM

## 2024-03-27 PROCEDURE — 84145 PROCALCITONIN (PCT): CPT

## 2024-03-27 PROCEDURE — 93458 L HRT ARTERY/VENTRICLE ANGIO: CPT | Performed by: STUDENT IN AN ORGANIZED HEALTH CARE EDUCATION/TRAINING PROGRAM

## 2024-03-27 PROCEDURE — 6370000000 HC RX 637 (ALT 250 FOR IP): Performed by: NURSE PRACTITIONER

## 2024-03-27 PROCEDURE — C1894 INTRO/SHEATH, NON-LASER: HCPCS | Performed by: STUDENT IN AN ORGANIZED HEALTH CARE EDUCATION/TRAINING PROGRAM

## 2024-03-27 PROCEDURE — 2580000003 HC RX 258: Performed by: INTERNAL MEDICINE

## 2024-03-27 PROCEDURE — 2500000003 HC RX 250 WO HCPCS

## 2024-03-27 PROCEDURE — 2580000003 HC RX 258

## 2024-03-27 PROCEDURE — 85520 HEPARIN ASSAY: CPT

## 2024-03-27 PROCEDURE — 6360000002 HC RX W HCPCS: Performed by: INTERNAL MEDICINE

## 2024-03-27 PROCEDURE — 82803 BLOOD GASES ANY COMBINATION: CPT

## 2024-03-27 PROCEDURE — C1769 GUIDE WIRE: HCPCS | Performed by: STUDENT IN AN ORGANIZED HEALTH CARE EDUCATION/TRAINING PROGRAM

## 2024-03-27 PROCEDURE — 80048 BASIC METABOLIC PNL TOTAL CA: CPT

## 2024-03-27 PROCEDURE — 85025 COMPLETE CBC W/AUTO DIFF WBC: CPT

## 2024-03-27 PROCEDURE — 94760 N-INVAS EAR/PLS OXIMETRY 1: CPT

## 2024-03-27 PROCEDURE — 99223 1ST HOSP IP/OBS HIGH 75: CPT | Performed by: INTERNAL MEDICINE

## 2024-03-27 PROCEDURE — 6370000000 HC RX 637 (ALT 250 FOR IP): Performed by: INTERNAL MEDICINE

## 2024-03-27 PROCEDURE — 94640 AIRWAY INHALATION TREATMENT: CPT

## 2024-03-27 PROCEDURE — 6360000004 HC RX CONTRAST MEDICATION: Performed by: STUDENT IN AN ORGANIZED HEALTH CARE EDUCATION/TRAINING PROGRAM

## 2024-03-27 RX ORDER — AMLODIPINE BESYLATE 10 MG/1
10 TABLET ORAL DAILY
Status: ON HOLD | COMMUNITY
End: 2024-04-03 | Stop reason: HOSPADM

## 2024-03-27 RX ORDER — ISOSORBIDE MONONITRATE 60 MG/1
60 TABLET, EXTENDED RELEASE ORAL 2 TIMES DAILY
COMMUNITY

## 2024-03-27 RX ORDER — SODIUM CHLORIDE 9 MG/ML
INJECTION, SOLUTION INTRAVENOUS CONTINUOUS
Status: DISCONTINUED | OUTPATIENT
Start: 2024-03-27 | End: 2024-03-28

## 2024-03-27 RX ORDER — METOPROLOL TARTRATE 100 MG/1
100 TABLET ORAL 2 TIMES DAILY
Status: ON HOLD | COMMUNITY
End: 2024-04-03 | Stop reason: HOSPADM

## 2024-03-27 RX ORDER — INSULIN LISPRO 100 [IU]/ML
0-16 INJECTION, SOLUTION INTRAVENOUS; SUBCUTANEOUS EVERY 4 HOURS
Status: DISCONTINUED | OUTPATIENT
Start: 2024-03-27 | End: 2024-03-28

## 2024-03-27 RX ADMIN — SODIUM CHLORIDE, PRESERVATIVE FREE 10 ML: 5 INJECTION INTRAVENOUS at 19:27

## 2024-03-27 RX ADMIN — INSULIN GLARGINE 10 UNITS: 100 INJECTION, SOLUTION SUBCUTANEOUS at 08:45

## 2024-03-27 RX ADMIN — BUPROPION HYDROCHLORIDE 150 MG: 150 TABLET, EXTENDED RELEASE ORAL at 19:24

## 2024-03-27 RX ADMIN — NIFEDIPINE 30 MG: 30 TABLET, EXTENDED RELEASE ORAL at 08:44

## 2024-03-27 RX ADMIN — PANTOPRAZOLE SODIUM 40 MG: 40 TABLET, DELAYED RELEASE ORAL at 08:44

## 2024-03-27 RX ADMIN — SODIUM CHLORIDE, PRESERVATIVE FREE 10 ML: 5 INJECTION INTRAVENOUS at 08:46

## 2024-03-27 RX ADMIN — WATER 1000 MG: 1 INJECTION INTRAMUSCULAR; INTRAVENOUS; SUBCUTANEOUS at 11:19

## 2024-03-27 RX ADMIN — SODIUM CHLORIDE: 9 INJECTION, SOLUTION INTRAVENOUS at 11:15

## 2024-03-27 RX ADMIN — CARVEDILOL 6.25 MG: 6.25 TABLET, FILM COATED ORAL at 16:22

## 2024-03-27 RX ADMIN — ATORVASTATIN CALCIUM 80 MG: 80 TABLET, FILM COATED ORAL at 19:24

## 2024-03-27 RX ADMIN — INSULIN GLARGINE 10 UNITS: 100 INJECTION, SOLUTION SUBCUTANEOUS at 19:24

## 2024-03-27 RX ADMIN — ASPIRIN 81 MG 81 MG: 81 TABLET ORAL at 08:44

## 2024-03-27 RX ADMIN — RANOLAZINE 1000 MG: 500 TABLET, FILM COATED, EXTENDED RELEASE ORAL at 08:44

## 2024-03-27 RX ADMIN — IOPAMIDOL 65 ML: 755 INJECTION, SOLUTION INTRAVENOUS at 10:56

## 2024-03-27 RX ADMIN — CARVEDILOL 6.25 MG: 6.25 TABLET, FILM COATED ORAL at 08:43

## 2024-03-27 RX ADMIN — INSULIN LISPRO 12 UNITS: 100 INJECTION, SOLUTION INTRAVENOUS; SUBCUTANEOUS at 23:50

## 2024-03-27 RX ADMIN — BUPROPION HYDROCHLORIDE 150 MG: 150 TABLET, EXTENDED RELEASE ORAL at 08:44

## 2024-03-27 RX ADMIN — INSULIN LISPRO 8 UNITS: 100 INJECTION, SOLUTION INTRAVENOUS; SUBCUTANEOUS at 08:44

## 2024-03-27 RX ADMIN — MORPHINE SULFATE 4 MG: 4 INJECTION, SOLUTION INTRAMUSCULAR; INTRAVENOUS at 14:42

## 2024-03-27 RX ADMIN — AMITRIPTYLINE HYDROCHLORIDE 40 MG: 10 TABLET, FILM COATED ORAL at 19:24

## 2024-03-27 RX ADMIN — INSULIN LISPRO 4 UNITS: 100 INJECTION, SOLUTION INTRAVENOUS; SUBCUTANEOUS at 04:12

## 2024-03-27 RX ADMIN — RANOLAZINE 1000 MG: 500 TABLET, FILM COATED, EXTENDED RELEASE ORAL at 19:24

## 2024-03-27 RX ADMIN — MOMETASONE FUROATE AND FORMOTEROL FUMARATE DIHYDRATE 2 PUFF: 200; 5 AEROSOL RESPIRATORY (INHALATION) at 19:47

## 2024-03-27 RX ADMIN — PREDNISONE 40 MG: 20 TABLET ORAL at 08:44

## 2024-03-27 RX ADMIN — MORPHINE SULFATE 4 MG: 4 INJECTION, SOLUTION INTRAMUSCULAR; INTRAVENOUS at 19:17

## 2024-03-27 RX ADMIN — MORPHINE SULFATE 4 MG: 4 INJECTION, SOLUTION INTRAMUSCULAR; INTRAVENOUS at 08:45

## 2024-03-27 RX ADMIN — MORPHINE SULFATE 4 MG: 4 INJECTION, SOLUTION INTRAMUSCULAR; INTRAVENOUS at 04:13

## 2024-03-27 RX ADMIN — MONTELUKAST 10 MG: 10 TABLET, FILM COATED ORAL at 08:44

## 2024-03-27 RX ADMIN — MOMETASONE FUROATE AND FORMOTEROL FUMARATE DIHYDRATE 2 PUFF: 200; 5 AEROSOL RESPIRATORY (INHALATION) at 08:54

## 2024-03-27 RX ADMIN — QUETIAPINE FUMARATE 50 MG: 25 TABLET ORAL at 19:38

## 2024-03-27 ASSESSMENT — PAIN DESCRIPTION - LOCATION
LOCATION: CHEST

## 2024-03-27 ASSESSMENT — PAIN SCALES - GENERAL
PAINLEVEL_OUTOF10: 6
PAINLEVEL_OUTOF10: 0
PAINLEVEL_OUTOF10: 4
PAINLEVEL_OUTOF10: 2
PAINLEVEL_OUTOF10: 8
PAINLEVEL_OUTOF10: 9
PAINLEVEL_OUTOF10: 8
PAINLEVEL_OUTOF10: 8
PAINLEVEL_OUTOF10: 5
PAINLEVEL_OUTOF10: 2
PAINLEVEL_OUTOF10: 2
PAINLEVEL_OUTOF10: 3
PAINLEVEL_OUTOF10: 2
PAINLEVEL_OUTOF10: 4

## 2024-03-27 ASSESSMENT — PAIN DESCRIPTION - DESCRIPTORS
DESCRIPTORS: HEAVINESS
DESCRIPTORS: CRAMPING;CRUSHING
DESCRIPTORS: ACHING;SHARP
DESCRIPTORS: CRAMPING;CRUSHING
DESCRIPTORS: HEAVINESS

## 2024-03-27 ASSESSMENT — PAIN DESCRIPTION - FREQUENCY: FREQUENCY: INTERMITTENT

## 2024-03-27 ASSESSMENT — PAIN DESCRIPTION - ONSET
ONSET: ON-GOING
ONSET: GRADUAL

## 2024-03-27 ASSESSMENT — PAIN DESCRIPTION - ORIENTATION
ORIENTATION: LEFT
ORIENTATION: MID;LEFT
ORIENTATION: LEFT
ORIENTATION: MID
ORIENTATION: MID

## 2024-03-27 ASSESSMENT — PAIN DESCRIPTION - PAIN TYPE
TYPE: ACUTE PAIN
TYPE: ACUTE PAIN

## 2024-03-27 NOTE — PROGRESS NOTES
Medication Reconciliation    List of medications patient is currently taking is complete.     Source of information: 1. Conversation with patient at bedside                                      2. EPIC records      Neno Nuñez RPH, PharmD, BCPS  3/27/2024 12:09 PM

## 2024-03-27 NOTE — H&P
Hospital Medicine History & Physical      PCP: Marin Cardenas MD    Date of Admission: 3/26/2024    Date of Service: Pt seen/examined on 3/26/2024    Pt seen/examined face to face on and admitted as inpatient with expected LOS to be two days but can change depending on diagnostic work up and treatment response.     Chief Complaint:    Chief Complaint   Patient presents with    Chest Pain     Patient presents to ED complaining of chest pain which started 2 hours ago. Patient reports having a hearty attack 2 weeks ago and states this feels very similarly. Reports some shortness of breath.            ASSESSMENT AND PLAN:    Active Hospital Problems    Diagnosis Date Noted    Chest pain [R07.9] 02/17/2023     Priority: Medium       Atypical chest pain:  Troponin 41, 36  Aspirin given in the ED  Cardiology consulted, appreciated    Acute asthma exacerbation  DuoNebs, prednisone  Supportive care    Acute on chronic renal failure,  Nephrology consulted, appreciated    Acute blood loss anemia  No signs or symptoms of bleeding  Continue to trend    Type 2 Diabetes: Reviewed patient's medications. Plan is to hold oral medications and continued with reduced home dose of long acting and Insulin sliding scale  Hyperlipidemia: Controlled on home Statin. Outpatient PCP follow up post-discharge  Essential Hypertension: Reviewed patient's medications and plan is to continue home medication  GERD: Continue home medication  Paroxysmal Atrial fibrilliation: unspecified and clinically unable to determine etiology.  Controlled on home medication.  currently Anticoagulated and Monitored on tele  History of CVA with right-sided weakness: Chronic changes    .Due to the above diagnosis makes the patient higher risk for morbidity and mortality requiring testing and treatment      Discussion with the primary ER physician in regards to symptoms, history, physical exam, diagnosis and treatment, collaborative decision was to admit the

## 2024-03-27 NOTE — CONSULTS
[facility]   CONSULTATION        Chief Complaint   Patient presents with    Chest Pain     Patient presents to ED complaining of chest pain which started 2 hours ago. Patient reports having a hearty attack 2 weeks ago and states this feels very similarly. Reports some shortness of breath.            History of Present Illness:  The patient is 67 y.o. female with a past medical history significant for CAD, type 2 DM, stage 3 CKD, COPD, prior TIA's, paroxysmal atrial fibrillation s/p Watchman device who presented to the [facility] ED with chest pain. I was asked to see her for this. She follows with Dr. Allred in the outpatient office.      Patient was admitted about 2 weeks an outside facility where she had exertional chest pain however was profoundly hypertensive at that time.  Plan was for blood pressure control and outpatient ischemic evaluation.  The patient did have a recent angiogram performed September 2023 with patent previously placed stents with mild ISR.    Reports to me that chest pain accelerated last night which is what brought her to the ED, says it does feel similar to how she felt before her multivessel PCI's were performed.  Additionally endorses to me that she has had continued pain in her lower extremities which is now occurring even at rest.      Past Medical History:   has a past medical history of Acid reflux, Anemia, Anxiety and depression, Arthritis, Asthma, Atrial fibrillation ([facility]), CAD (coronary artery disease), Cerebral artery occlusion with cerebral infarction ([facility]), CHF (congestive heart failure) ([facility]), Chronic kidney disease--stage III, COPD (chronic obstructive pulmonary disease) ([facility]), DM2 (diabetes mellitus, type 2) ([facility]), Dysarthria, Fibromyalgia, Headache(784.0), Hemisensory loss, History of blood transfusion, Hyperlipidemia, Hypertension, IBS (irritable bowel syndrome), 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

## 2024-03-27 NOTE — PROGRESS NOTES
Clinical Pharmacy Note  Heparin Dosing       Lab Results   Component Value Date/Time    ANTIXAUHEP 0.63 03/27/2024 07:18 AM      Lab Results   Component Value Date/Time    HGB 9.4 03/27/2024 04:34 AM    HCT 28.8 03/27/2024 04:34 AM     03/27/2024 04:34 AM    INR 1.03 06/19/2021 06:31 PM       Current Infusion Rate: 590 units/hr    Plan:  Rate: continue 590 units/hr  Next anti-Xa level: 3/27/24 1300    Pharmacy will continue to monitor and adjust based on anti-Xa results.  Barbara Brian RPH, PharmD 3/27/2024 7:53 AM

## 2024-03-27 NOTE — PROGRESS NOTES
Clinical Pharmacy Note  Heparin Dosing       Lab Results   Component Value Date/Time    ANTIXAUHEP 0.73 03/26/2024 09:55 PM      Lab Results   Component Value Date/Time    HGB 9.3 03/26/2024 01:25 PM    HCT 28.2 03/26/2024 01:25 PM     03/26/2024 01:25 PM    INR 1.03 06/19/2021 06:31 PM       Current Infusion Rate: 640 units/hr    Plan:  Bolus: 0 units  Rate: 590 units/hr  Next anti-Xa level: 0600 03/27/24    Pharmacy will continue to monitor and adjust based on anti-Xa results.    Liliana Jefferson, DickD

## 2024-03-27 NOTE — TELEPHONE ENCOUNTER
Patient needs scheduled for outpatient bilateral lower extremity arterial Doppler then follow up with Dr Allred.

## 2024-03-27 NOTE — PROGRESS NOTES
4 Eyes Skin Assessment     NAME:  Maren Meza  YOB: 1955  MEDICAL RECORD NUMBER:  [id number]    The patient is being assessed for  Admission    I agree that at least one RN has performed a thorough Head to Toe Skin Assessment on the patient. ALL assessment sites listed below have been assessed.      Areas assessed by both nurses:    Head, Face, Ears, Shoulders, Back, Chest, Arms, Elbows, Hands, Sacrum. Buttock, Coccyx, Ischium, Legs. Feet and Heels, and Under Medical Devices         Does the Patient have a Wound? No noted wound(s)       Rakan Prevention initiated by RN: Yes  Wound Care Orders initiated by RN: No    Pressure Injury (Stage 3,4, Unstageable, DTI, NWPT, and Complex wounds) if present, place Wound referral order by RN under : No    New Ostomies, if present place, Ostomy referral order under : No     Nurse 1 eSignature: Electronically signed by Leda Collins RN on 3/27/24 at 3:53 AM EDT    **SHARE this note so that the co-signing nurse can place an eSignature**    Nurse 2 eSignature: Electronically signed by Matthew Leal RN on 3/27/24 at 4:03 AM EDT

## 2024-03-27 NOTE — PROGRESS NOTES
Shift Summary: Patient had LHC, no blockages found. Lt femoral site accessed for the procedure, no any bleeding, swelling, tenderness to the site.        Family updated: Patient is been updating via her personal phone.    Most recent vitals: BP (!) 179/54   Pulse 92   Temp 97.8 °F (36.6 °C) (Oral)   Resp 16   Ht 1.524 m (5')   Wt 52.1 kg (114 lb 13.8 oz)   SpO2 100%   BMI 22.43 kg/m²      Rhythm: NSR    NC/HFNC- Room air  Respiratory support: - No ventilator support    Vent days: Day N/A    Admission weight Weight - Scale: 53.4 kg (117 lb 11.6 oz)  Today's weight   Wt Readings from Last 1 Encounters:   03/27/24 52.1 kg (114 lb 13.8 oz)         UOP >30ml/hr: Yes        Restraints: N/A     Lines/Drains reviewed @ bedside.  Peripheral IV 03/26/24 Right Antecubital (Active)   Number of days: 1     Saez need assessed each shift: Yes      Drip rates at handoff:    sodium chloride 75 mL/hr at 03/27/24 1446    dextrose      sodium chloride         Lab Data:   CBC:   Recent Labs     03/26/24  1325 03/27/24  0434   WBC 5.8 4.9   HGB 9.3* 9.4*   HCT 28.2* 28.8*   MCV 92.8 93.9    356     BMP:    Recent Labs     03/27/24  0434 03/27/24  0718   * 137   K 5.7* 4.6   CO2 18* 20*   BUN 30* 32*   CREATININE 1.8* 1.9*     LIVR:   Recent Labs     03/26/24  1325   AST 15   ALT <5*     PT/INR:   Recent Labs     03/26/24  1325   PROT 7.2     APTT:   Recent Labs     03/26/24  1539   APTT 28.1     ABG: No results for input(s): \"PHART\", \"NYR5YSQ\", \"PO2ART\" in the last 72 hours.    Any consults during the shift? N/A    Any signed and held orders to be released?  no        4 Eyes Skin Assessment     NAME:  Maren Meza  YOB: 1955  MEDICAL RECORD NUMBER:  [id number]    The patient is being assessed for  Shift Handoff    I agree that at least one RN has performed a thorough Head to Toe Skin Assessment on the patient. ALL assessment sites listed below have been assessed.      Areas assessed by both

## 2024-03-27 NOTE — CONSULTS
Nephrology Consult Note   [email or web address]      Reason for consultation: CKD 3b -- baseline Cr ~ 1.5-1.8 mg/dL. Follows with Dr. Chung in office    History of Present Illness: Maren Meza is a 67 yo female with a PMHx of CKD 3b, hx of KOLBY, CAD, afib, COPD, HLD, HTN, meningioma. Patient presented to  ED on 3/26/2024 with complaints of chest pain. States CP began yesterday morning and radiated to L arm and face. Patient initially presented to [facility]. She was transferred to  on a nitro gtt and heparin. Upon presentation to , pt was transferred to ICU. Nitro gtt off. Trop 41>36>38. Cardiology consulted with plans for heart cath today. Of note, patient was recently admitted to [facility] for NSTEMI -- treated medically with heparin gtt.     We have been consulted for CKD 3b management. Follows with Dr. Chung in office. Baseline Cr is ~ 1.5-1.8 mg/dL. Cr today is 1.9 mg/dL -- close to baseline. Denies  symptoms. Endorses some nausea and recent emesis x1. Endorses recent poor appetite. Denies  symptoms. Denies NSAID use. Lasix is listed in Epic PTA meds, but, patient states this was d/c'd and she does not currently take this medication. Other PTA medications of note include losartan.     Subjective:      Patient seen and examined. Labs and chart reviewed. Resting in bed.     There were not complications last night.    Patient review of systems: Endorses SOB. See HPI for other pertinent ROS.     Past Medical History:   Diagnosis Date    Acid reflux     Anemia     Anxiety and depression     Arthritis     Asthma     Atrial fibrillation (HCC)     CAD (coronary artery disease) 12/3/2012    Cerebral artery occlusion with cerebral infarction ([facility])     TIA\"\"S--right sided weakness & headache    CHF (congestive heart failure) ([facility])     Chronic kidney disease--stage III     40% kidney function    COPD (chronic obstructive pulmonary disease) ([facility])     DM2 (diabetes mellitus, type 2) ([facility])     Dysarthria      AMORPHOUS Rare 12/11/2014 11:11 AM         IMPRESSION/RECOMMENDATIONS:      CKD 3b: baseline Cr ~ 1.5-1.8 mg/dL. Follows with Dr. Chung in office.    - Cr 2.1>1.8>1.9 mg/dL. Cr slightly above baseline.   - At risk for KOLBY   - Start IVF pre/post cath today to mitigate risk of LYNN   - Hold losartan for now   - Avoid hypotension. Avoid nephrotoxic agents when possible   - Daily RFTs    Chest Pain / CAD   - Trop 41>36>38   - LHC today    ?PNA   - CXR (3/26/2024): patchy opacities in the right lung base, favored to represent atelectasis   - On abx   - Pulmonology following    COPD    Hypertension   - Continue current medication regiment    Anemia   - Hgb 9.4 g/L   - Iron studies ordered    CKD-MBD   - Phos ordered   - Monitor    Will discuss with nephrology attending physician, Dr. Xie.  See attestation for additional recommendations.    Rain Monge, APRN - CNP

## 2024-03-27 NOTE — PROGRESS NOTES
Oozing continues at groin site.  Pressure held for 20 minutes.  Bedside report to Delmis DE LA VEGA.

## 2024-03-27 NOTE — PROGRESS NOTES
Pt complains of being extremely itching. Most likely from CHG wipes. Washed off pt and applied lotion. Pt got relief for itching.

## 2024-03-27 NOTE — PROGRESS NOTES
Patient OOB to BSC with 1 person assist.  Right fem site continues to ooze, pressure held.  Will monitor.

## 2024-03-27 NOTE — PROGRESS NOTES
Shift Summary: Pt admitted to 2107 for Chest Pain. Pt has a hx of heart attack two weeks ago was seen at [facility]. Currently on Heparin gtt @ 590. Nitro gtt d/c'd. Chest Pain controlled with morphine. Pt only has one PIV in R AC. NP attempted fem line was unsuccessful. Also, would avoid CHG wipes maybe pt very itching- washed off and applied lotion.        Family updated: Nurse did not update family but pt did.    Most recent vitals: BP (!) 158/71   Pulse 70   Temp 98.4 °F (36.9 °C) (Oral)   Resp 12   Ht 1.524 m (5')   Wt 52.6 kg (115 lb 15.4 oz)   SpO2 100%   BMI 22.65 kg/m²      Rhythm: Normal Sinus Rhythm          Admission weight Weight - Scale: 53.4 kg (117 lb 11.6 oz)  Today's weight   Wt Readings from Last 1 Encounters:   03/26/24 52.6 kg (115 lb 15.4 oz)         UOP >30ml/hr: no         Restraints: no  Order current and documentation up to date?    Lines/Drains reviewed @ bedside.  Peripheral IV 03/26/24 Right Antecubital (Active)   Number of days: 0     Saez need assessed each shift: no      Drip rates at handoff:    dextrose      heparin (PORCINE) Infusion 590 Units/hr (03/26/24 2246)    sodium chloride         Lab Data:   CBC:   Recent Labs     03/26/24  1325   WBC 5.8   HGB 9.3*   HCT 28.2*   MCV 92.8        BMP:    Recent Labs     03/26/24  1325   *   K 4.2   CO2 22   BUN 33*   CREATININE 2.1*     LIVR:   Recent Labs     03/26/24  1325   AST 15   ALT <5*     PT/INR:   Recent Labs     03/26/24  1325   PROT 7.2     APTT:   Recent Labs     03/26/24  1539   APTT 28.1     ABG: No results for input(s): \"PHART\", \"FWL6DUA\", \"PO2ART\" in the last 72 hours.    Any consults during the shift? Cardio, Nephro, CC    Any signed and held orders to be released?  no        4 Eyes Skin Assessment     NAME:  Maren Meza  YOB: 1955  MEDICAL RECORD NUMBER:  [id number]    The patient is being assessed for  Shift Handoff    I agree that at least one RN has performed a thorough Head to

## 2024-03-27 NOTE — PROGRESS NOTES
PATIENT HISTORY    ECHO: DATE: 8/29/2022       EF: 55%  STRESS TEST PREFORMED:  Yes FINDINGS:  Stress Nuclear: Date:  8/15/2023  Result: Negative     EF: 55%  EKG: Yes    ECG     Result: Normal  Pre CATH Rhythm: Normal Sinus Rhythm  HYPERTENSION: Yes  DYSLIPIDEMIA: Yes  FAMILY HX OF CAD: No  PRIOR MI: No  PRIOR PCI: Yes.  Most recent date: 2020  PRIOR CABG: No  CEREBROVASCULAR DX: Yes  PERIPHERAL ARTERIAL DISEASE: No  CHRONIC LUNG DISEASE: Yes  TOBACCO: Former.   Quit Date: 2018  DIABETIC: Yes  CARDIAC ARREST: {No  DIALYSIS: No  HEART FAILURE: Yes  FRAILTY SCORE: 4 VULNERABLE (while not dependent on others for IADLs, symptoms limit activities)  CARDIAC CTA PREFORMED:  No  [place] CORONARY CALCIUM SCORE:   Assessed: No  Prior Diagnostic Coronary Angioplasty Procedure:  No

## 2024-03-27 NOTE — PROGRESS NOTES
Hospitalist Progress Note      PCP: Marin Cardenas MD    Date of Admission: 3/26/2024    Subjective: CP better, SOB improved    Medications:  Reviewed    Infusion Medications    sodium chloride 75 mL/hr at 03/27/24 1446    dextrose      sodium chloride       Scheduled Medications    insulin lispro  0-16 Units SubCUTAneous Q4H    cefTRIAXone (ROCEPHIN) IV  1,000 mg IntraVENous Q24H    ranolazine  1,000 mg Oral BID    pantoprazole  40 mg Oral QAM AC    NIFEdipine  30 mg Oral Daily    montelukast  10 mg Oral Daily    linaclotide  290 mcg Oral QAM AC    insulin glargine  10 Units SubCUTAneous BID    mometasone-formoterol  2 puff Inhalation BID RT    carvedilol  6.25 mg Oral BID WC    buPROPion  150 mg Oral BID    atorvastatin  80 mg Oral Nightly    aspirin  81 mg Oral Daily    amitriptyline  40 mg Oral Nightly    sodium chloride flush  5-40 mL IntraVENous 2 times per day    predniSONE  40 mg Oral Daily     PRN Meds: QUEtiapine, glucose, dextrose bolus **OR** dextrose bolus, glucagon (rDNA), dextrose, ondansetron, sodium chloride flush, sodium chloride, polyethylene glycol, acetaminophen **OR** acetaminophen, albuterol, labetalol, morphine, nitroGLYCERIN, ipratropium 0.5 mg-albuterol 2.5 mg      Intake/Output Summary (Last 24 hours) at 3/27/2024 1451  Last data filed at 3/27/2024 1446  Gross per 24 hour   Intake 869.58 ml   Output 825 ml   Net 44.58 ml       Physical Exam Performed:    BP (!) 175/73   Pulse 58   Temp 98.4 °F (36.9 °C) (Oral)   Resp (!) 9   Ht 1.524 m (5')   Wt 52.1 kg (114 lb 13.8 oz)   SpO2 100%   BMI 22.43 kg/m²       General appearance:  mild acute distress, appears older than stated age  Respiratory:minimal accessory muscle usage, Normal respiratory effort.  Wheezing  Cardiovascular:  Regular rate and rhythm, capillary refill 2 seconds  Abdomen: Soft, non-tender, non-distended with normal bowel sounds.  Musculoskeletal:  No clubbing, cyanosis. trace edema LE bilaterally.   Skin:  turgor normal.  No new rashes or lesions.  Neurologic: Alert and oriented x4, no new focal sensory/motor deficits.  Right-sided weakness per patient chronic     Labs:   Recent Labs     03/26/24  1325 03/27/24  0434   WBC 5.8 4.9   HGB 9.3* 9.4*   HCT 28.2* 28.8*    356     Recent Labs     03/26/24  1325 03/26/24  2155 03/27/24  0434 03/27/24  0718   *  --  134* 137   K 4.2  --  5.7* 4.6     --  101 102   CO2 22  --  18* 20*   BUN 33*  --  30* 32*   CREATININE 2.1*  --  1.8* 1.9*   CALCIUM 8.9  --  8.8 8.9   PHOS  --  3.5  --  4.0     Recent Labs     03/26/24  1325   AST 15   ALT <5*   BILITOT 0.3   ALKPHOS 127     No results for input(s): \"INR\" in the last 72 hours.  Recent Labs     03/26/24  1325 03/26/24  1432 03/27/24  0214   TROPHS 41* 36* 38*       Urinalysis:      Lab Results   Component Value Date/Time    NITRU Negative 02/20/2023 10:57 AM    WBCUA 0 02/20/2023 10:57 AM    BACTERIA None Seen 02/20/2023 10:57 AM    RBCUA 0 02/20/2023 10:57 AM    BLOODU Negative 02/20/2023 10:57 AM    SPECGRAV 1.015 02/20/2023 10:57 AM    GLUCOSEU Negative 02/20/2023 10:57 AM    GLUCOSEU NEGATIVE 05/14/2012 03:29 PM       Radiology:  XR CHEST PORTABLE   Final Result   Patchy opacities in the right lung base, favored to represent atelectasis   however airspace disease not excluded.             IP CONSULT TO CARDIOLOGY  IP CONSULT TO PULMONOLOGY  IP CONSULT TO CARDIOLOGY  IP CONSULT TO NEPHROLOGY    Assessment/Plan:    Active Hospital Problems    Diagnosis     Chest pain [R07.9]      Priority: Medium       Atypical chest pain:  Troponin 41, 36  Aspirin given in the ED  Cardiology consulted     Acute asthma exacerbation  DuoNebs, prednisone  Supportive care  Pulm consulted     Acute on chronic renal failure,  Nephrology consulted, appreciated  Hold losartan     Acute blood loss anemia  No signs or symptoms of bleeding  Continue to trend    RUL PNA - pulm consulted, considering CT chest to further evaluate, on

## 2024-03-27 NOTE — PROCEDURES
[facility]  Procedure Note    The risks, benefits, and details of the procedure were explained to the patient.  The patient verbalized understanding and wanted to proceed.  Informed written consent was obtained.    Pre-Sedation:  Pre-Sedation Documentation and Exam:  I have personally completed a history, physical exam & review of systems for this patient (see notes).    Prior History of Anesthesia Complications:   none    Modified Mallampati:  II (soft palate, uvula, fauces visible)    ASA Classification:  Class 2 - A normal healthy patient with mild systemic disease    Tessa Scale:  Activity:  2 - Able to move 4 extremities voluntarily on command  Respiration:  2 - Able to breathe deeply and cough freely  Circulation:  2 - BP+/- 20mmHg of normal  Consciousness:  2 - Fully awake  Oxygen Saturation (color):  2 - Able to maintain oxygen saturation >92% on room air    Sedation/Anesthesia Plan:  Guard the patient's safety and welfare.  Minimize physical discomfort and pain.  Minimize negative psychological responses to treatment by providing sedation and analgesia and maximize the potential amnesia.  Patient to meet pre-procedure discharge plan.    Medication Planned:  midazolam intravenously and fentanyl intravenously    Patient is an appropriate candidate for plan of sedation:   yes      INDICATION:  Pre-Procedure Diagnosis:  ACS <= 24 hrs    Post-Procedure Diagnosis: same    PROCEDURES PERFORMED:   LHC/Coronary angiogram    PROCEDURE TECHNIQUE:  Local anesthetic was given and access was obtained in the right Common femoral artery using a micropuncture technique and a 6 Fr Terumo Sheath was placed without difficulty. Catheters were advanced over a 0.35 wire under fluoroscopic guidance  Left coronary angiography was done using a 5 Fr Leonardo L 4.0 diagnostic catheter.  Right coronary angiography was done using a 5 Fr Leonardo R4 diagnostic catheter.  LVEDP obtained. At the end of the procedure a Mynx

## 2024-03-27 NOTE — CONSULTS
03/27/24  0718   *  --  134* 137   K 4.2  --  5.7* 4.6     --  101 102   CO2 22  --  18* 20*   PHOS  --  3.5  --   --    BUN 33*  --  30* 32*   CREATININE 2.1*  --  1.8* 1.9*     LIVER PROFILE:   Recent Labs     03/26/24  1325   AST 15   ALT <5*   BILITOT 0.3   ALKPHOS 127     PT/INR: No results for input(s): \"PROTIME\", \"INR\" in the last 72 hours.  APTT:   Recent Labs     03/26/24  1539   APTT 28.1     UA:No results for input(s): \"NITRITE\", \"COLORU\", \"PHUR\", \"LABCAST\", \"WBCUA\", \"RBCUA\", \"MUCUS\", \"TRICHOMONAS\", \"YEAST\", \"BACTERIA\", \"CLARITYU\", \"SPECGRAV\", \"LEUKOCYTESUR\", \"UROBILINOGEN\", \"BILIRUBINUR\", \"BLOODU\", \"GLUCOSEU\", \"AMORPHOUS\" in the last 72 hours.    Invalid input(s): \"KETONESU\"  No results for input(s): \"PHART\", \"EVF0CKL\", \"PO2ART\" in the last 72 hours.         Radiology Review:  Pertinent images / reports were reviewed as a part of this visit.    CT Chest w/ contrast: Results for orders placed in visit on 10/08/19    CT Chest W Contrast    Narrative  Site: [facility] #: [id number] #: [id number]: BNCVICAccount #: [id number] #: [id number] #: [id number]: CT CHEST W CONTRASTExam Date/Time: 10/08/2019 06:00 AMAdmitting Diagnosis: Other venous embolism and  thrombosisReason for Exam: Other venous embolism and thrombosis  Dictated by: MONICA ORTEGA EDWARDO: 10/08/2019 08:39 AMT: This document is confidential medical information.  Unauthorized disclosure or use of this information is prohibited by law.If you are not the intended recipient of this document, please advise  us by calling immediately [phone].  Impression/Conclusion below    100  HISTORY:   Other venous embolism and thrombosis Eval for venous vascular access  COMPARISON: August 28, 2008  TECHNIQUE: Postcontrast multiplanar CT images of the chest  NOTE:  If there are questions about the content of this report, please contact [facility]  radiology by calling [phone]    FINDINGS:  LUNGS/AIRWAYS:   opacified for  evaluation.  No evidence of intraluminal filling defect to suggest pulmonary  embolism.  Main pulmonary artery is normal in caliber.    Mediastinum: No evidence of mediastinal lymphadenopathy.  The heart and  pericardium demonstrate no acute abnormality.  There is no acute abnormality  of the thoracic aorta.  There is chronic occlusion of the superior vena cava  with numerous venous collaterals throughout the mediastinum and chest wall.    Lungs/pleura: The lungs are without acute process.  No focal consolidation or  pulmonary edema.  No evidence of pleural effusion or pneumothorax.  There is  mild scattered pulmonary fibrosis within the anterior aspects of the upper  lobes.    Upper Abdomen: Limited images of the upper abdomen are unremarkable.    Soft Tissues/Bones: No acute bone or soft tissue abnormality.    Impression  1. Negative for pulmonary embolus.  2. Mild bilateral pulmonary fibrosis.  3. Chronic SVC occlusion with associated venous collateralization.      CXR PA/LAT: Results for orders placed during the hospital encounter of 04/07/23    XR CHEST (2 VW)    Narrative  EXAMINATION:  TWO XRAY VIEWS OF THE CHEST    4/7/2023 5:35 pm    COMPARISON:  02/17/2023    HISTORY:  ORDERING SYSTEM PROVIDED HISTORY: chest pain  TECHNOLOGIST PROVIDED HISTORY:  Reason for exam:->chest pain  Reason for Exam: chest pain    FINDINGS:  Chronic interstitial opacities seen throughout both lungs.  No acute  infiltrate identified.  Cardial pericardial silhouette is stable, with  redemonstration of right paratracheal stripe prominence.  No pneumothorax.  No free air.  No acute bony abnormality.    Impression  No acute abnormality identified.      CXR portable: Results for orders placed during the hospital encounter of 03/26/24    XR CHEST PORTABLE    Narrative  EXAMINATION:  ONE XRAY VIEW OF THE CHEST    3/26/2024 12:46 pm    COMPARISON:  CXR dated 04/29/2023    HISTORY:  ORDERING SYSTEM PROVIDED HISTORY: chest

## 2024-03-27 NOTE — RT PROTOCOL NOTE
RT Inhaler-Nebulizer Bronchodilator Protocol Note    There is a bronchodilator order in the chart from a provider indicating to follow the RT Bronchodilator Protocol and there is an “Initiate RT Inhaler-Nebulizer Bronchodilator Protocol” order as well (see protocol at bottom of note).    CXR Findings:  XR CHEST PORTABLE    Result Date: 3/26/2024  Patchy opacities in the right lung base, favored to represent atelectasis however airspace disease not excluded.       The findings from the last RT Protocol Assessment were as follows:   History Pulmonary Disease: Smoker 15 pack years or more  Respiratory Pattern: Regular pattern and RR 12-20 bpm  Breath Sounds: Slightly diminished and/or crackles  Cough: Strong, spontaneous, non-productive  Indication for Bronchodilator Therapy: Wheezing associated with pulm disorder  Bronchodilator Assessment Score: 3    Aerosolized bronchodilator medication orders have been revised according to the RT Inhaler-Nebulizer Bronchodilator Protocol below.    Respiratory Therapist to perform RT Therapy Protocol Assessment initially then follow the protocol.  Repeat RT Therapy Protocol Assessment PRN for score 0-3 or on second treatment, BID, and PRN for scores above 3.    No Indications - adjust the frequency to every 6 hours PRN wheezing or bronchospasm, if no treatments needed after 48 hours then discontinue using Per Protocol order mode.     If indication present, adjust the RT bronchodilator orders based on the Bronchodilator Assessment Score as indicated below.  Use Inhaler orders unless patient has one or more of the following: on home nebulizer, not able to hold breath for 10 seconds, is not alert and oriented, cannot activate and use MDI correctly, or respiratory rate 25 breaths per minute or more, then use the equivalent nebulizer order(s) with same Frequency and PRN reasons based on the score.  If a patient is on this medication at home then do not decrease Frequency below that used  at home.    0-3 - enter or revise RT bronchodilator order(s) to equivalent RT Bronchodilator order with Frequency of every 4 hours PRN for wheezing or increased work of breathing using Per Protocol order mode.        4-6 - enter or revise RT Bronchodilator order(s) to two equivalent RT bronchodilator orders with one order with BID Frequency and one order with Frequency of every 4 hours PRN wheezing or increased work of breathing using Per Protocol order mode.        7-10 - enter or revise RT Bronchodilator order(s) to two equivalent RT bronchodilator orders with one order with TID Frequency and one order with Frequency of every 4 hours PRN wheezing or increased work of breathing using Per Protocol order mode.       11-13 - enter or revise RT Bronchodilator order(s) to one equivalent RT bronchodilator order with QID Frequency and an Albuterol order with Frequency of every 4 hours PRN wheezing or increased work of breathing using Per Protocol order mode.      Greater than 13 - enter or revise RT Bronchodilator order(s) to one equivalent RT bronchodilator order with every 4 hours Frequency and an Albuterol order with Frequency of every 2 hours PRN wheezing or increased work of breathing using Per Protocol order mode.     RT to enter RT Home Evaluation for COPD & MDI Assessment order using Per Protocol order mode.    Electronically signed by Chata Herrera RCP on 3/26/2024 at 11:24 PM

## 2024-03-27 NOTE — PROGRESS NOTES
Patient was admitted with chest pain, likely NSTEMI.  Will potentially need cardiac catheterization.  She has limited access and is a very difficult peripheral stick.  She is also on a heparin drip requiring coagulation lab studies.    I did consent the patient for a triple-lumen access.  Left femoral groin anatomical landmarks were identified and examined via ultrasound.  Site prepped with ChloraPrep x 2 and allowed to dry.  Sterile Gown and gloves donned and sterile drape placed.  Left femoral site anesthetized with 1% plain lidocaine  Needle introducer with ultrasound guidance accessed the left femoral vein, good blood return, guidewire passed, needle introducer retracted.  2 to 3 mm incision made along the guidewire via scalpel-> dilator passed over the guidewire however at approximately half centimeter into the soft tissue the dilator would no longer advance, significant resistance.  Dilator retracted,   Procedure aborted.     Left femoral triple-lumen access unsuccessful d/t not being able to advance the dilator.    Postprocedure pressure held to the left groin for approximately 5 minutes.  Reexamined post procedure at multiple intervals no evidence of an expanding hematoma or acute left groin swelling.

## 2024-03-27 NOTE — PLAN OF CARE
Problem: Discharge Planning  Goal: Discharge to home or other facility with appropriate resources  Outcome: Progressing  Flowsheets (Taken 3/27/2024 0800)  Discharge to home or other facility with appropriate resources:   Identify barriers to discharge with patient and caregiver   Arrange for needed discharge resources and transportation as appropriate   Identify discharge learning needs (meds, wound care, etc)   Arrange for interpreters to assist at discharge as needed   Refer to discharge planning if patient needs post-hospital services based on physician order or complex needs related to functional status, cognitive ability or social support system     Problem: Pain  Goal: Verbalizes/displays adequate comfort level or baseline comfort level  Outcome: Progressing  Flowsheets (Taken 3/27/2024 0800)  Verbalizes/displays adequate comfort level or baseline comfort level:   Encourage patient to monitor pain and request assistance   Assess pain using appropriate pain scale   Administer analgesics based on type and severity of pain and evaluate response   Implement non-pharmacological measures as appropriate and evaluate response   Consider cultural and social influences on pain and pain management   Notify Licensed Independent Practitioner if interventions unsuccessful or patient reports new pain     Problem: Skin/Tissue Integrity  Goal: Absence of new skin breakdown  Description: 1.  Monitor for areas of redness and/or skin breakdown  2.  Assess vascular access sites hourly  3.  Every 4-6 hours minimum:  Change oxygen saturation probe site  4.  Every 4-6 hours:  If on nasal continuous positive airway pressure, respiratory therapy assess nares and determine need for appliance change or resting period.  Outcome: Progressing     Problem: ABCDS Injury Assessment  Goal: Absence of physical injury  Outcome: Progressing     Problem: Safety - Adult  Goal: Free from fall injury  Outcome: Progressing     Problem: Chronic  Conditions and Co-morbidities  Goal: Patient's chronic conditions and co-morbidity symptoms are monitored and maintained or improved  Outcome: Progressing  Flowsheets (Taken 3/27/2024 0800)  Care Plan - Patient's Chronic Conditions and Co-Morbidity Symptoms are Monitored and Maintained or Improved:   Monitor and assess patient's chronic conditions and comorbid symptoms for stability, deterioration, or improvement   Collaborate with multidisciplinary team to address chronic and comorbid conditions and prevent exacerbation or deterioration   Update acute care plan with appropriate goals if chronic or comorbid symptoms are exacerbated and prevent overall improvement and discharge

## 2024-03-27 NOTE — PROGRESS NOTES
Patient back from cath lab. Lt femoral site accessed for procedure. No any bleeding noticed, no tenderness or swelling to the site. Patient resting comfortably, call light within reach.

## 2024-03-28 ENCOUNTER — APPOINTMENT (OUTPATIENT)
Dept: CT IMAGING | Age: 69
DRG: 193 | End: 2024-03-28
Payer: COMMERCIAL

## 2024-03-28 LAB
ALBUMIN SERPL-MCNC: 3 G/DL (ref 3.4–5)
ANION GAP SERPL CALCULATED.3IONS-SCNC: 15 MMOL/L (ref 3–16)
BASOPHILS # BLD: 0 K/UL (ref 0–0.2)
BASOPHILS NFR BLD: 0.1 %
BUN SERPL-MCNC: 31 MG/DL (ref 7–20)
CALCIUM SERPL-MCNC: 8.9 MG/DL (ref 8.3–10.6)
CHLORIDE SERPL-SCNC: 104 MMOL/L (ref 99–110)
CO2 SERPL-SCNC: 18 MMOL/L (ref 21–32)
CREAT SERPL-MCNC: 1.5 MG/DL (ref 0.6–1.2)
DEPRECATED RDW RBC AUTO: 16.4 % (ref 12.4–15.4)
EOSINOPHIL # BLD: 0 K/UL (ref 0–0.6)
EOSINOPHIL NFR BLD: 0 %
FERRITIN SERPL IA-MCNC: 127.3 NG/ML (ref 15–150)
GFR SERPLBLD CREATININE-BSD FMLA CKD-EPI: 38 ML/MIN/{1.73_M2}
GLUCOSE BLD-MCNC: 129 MG/DL (ref 70–99)
GLUCOSE BLD-MCNC: 197 MG/DL (ref 70–99)
GLUCOSE BLD-MCNC: 214 MG/DL (ref 70–99)
GLUCOSE BLD-MCNC: 249 MG/DL (ref 70–99)
GLUCOSE BLD-MCNC: 252 MG/DL (ref 70–99)
GLUCOSE BLD-MCNC: 268 MG/DL (ref 70–99)
GLUCOSE SERPL-MCNC: 238 MG/DL (ref 70–99)
HCT VFR BLD AUTO: 27.3 % (ref 36–48)
HGB BLD-MCNC: 9 G/DL (ref 12–16)
IRON SATN MFR SERPL: 14 % (ref 15–50)
IRON SERPL-MCNC: 37 UG/DL (ref 37–145)
LYMPHOCYTES # BLD: 0.5 K/UL (ref 1–5.1)
LYMPHOCYTES NFR BLD: 6.7 %
MAGNESIUM SERPL-MCNC: 2.6 MG/DL (ref 1.8–2.4)
MCH RBC QN AUTO: 30.6 PG (ref 26–34)
MCHC RBC AUTO-ENTMCNC: 33 G/DL (ref 31–36)
MCV RBC AUTO: 93 FL (ref 80–100)
MONOCYTES # BLD: 0.3 K/UL (ref 0–1.3)
MONOCYTES NFR BLD: 3.2 %
NEUTROPHILS # BLD: 7.4 K/UL (ref 1.7–7.7)
NEUTROPHILS NFR BLD: 90 %
PERFORMED ON: ABNORMAL
PHOSPHATE SERPL-MCNC: 3 MG/DL (ref 2.5–4.9)
PLATELET # BLD AUTO: 314 K/UL (ref 135–450)
PMV BLD AUTO: 7.6 FL (ref 5–10.5)
POTASSIUM SERPL-SCNC: 4.2 MMOL/L (ref 3.5–5.1)
RBC # BLD AUTO: 2.94 M/UL (ref 4–5.2)
SODIUM SERPL-SCNC: 137 MMOL/L (ref 136–145)
TIBC SERPL-MCNC: 265 UG/DL (ref 260–445)
TRANSFERRIN SERPL-MCNC: 212 MG/DL (ref 200–360)
WBC # BLD AUTO: 8.2 K/UL (ref 4–11)

## 2024-03-28 PROCEDURE — 36415 COLL VENOUS BLD VENIPUNCTURE: CPT

## 2024-03-28 PROCEDURE — 6370000000 HC RX 637 (ALT 250 FOR IP): Performed by: INTERNAL MEDICINE

## 2024-03-28 PROCEDURE — 82728 ASSAY OF FERRITIN: CPT

## 2024-03-28 PROCEDURE — 71250 CT THORAX DX C-: CPT

## 2024-03-28 PROCEDURE — 6370000000 HC RX 637 (ALT 250 FOR IP): Performed by: NURSE PRACTITIONER

## 2024-03-28 PROCEDURE — 6360000002 HC RX W HCPCS: Performed by: INTERNAL MEDICINE

## 2024-03-28 PROCEDURE — 2580000003 HC RX 258: Performed by: INTERNAL MEDICINE

## 2024-03-28 PROCEDURE — 83735 ASSAY OF MAGNESIUM: CPT

## 2024-03-28 PROCEDURE — 99232 SBSQ HOSP IP/OBS MODERATE 35: CPT | Performed by: INTERNAL MEDICINE

## 2024-03-28 PROCEDURE — 80069 RENAL FUNCTION PANEL: CPT

## 2024-03-28 PROCEDURE — 84466 ASSAY OF TRANSFERRIN: CPT

## 2024-03-28 PROCEDURE — 83540 ASSAY OF IRON: CPT

## 2024-03-28 PROCEDURE — 94760 N-INVAS EAR/PLS OXIMETRY 1: CPT

## 2024-03-28 PROCEDURE — 2580000003 HC RX 258

## 2024-03-28 PROCEDURE — 85025 COMPLETE CBC W/AUTO DIFF WBC: CPT

## 2024-03-28 PROCEDURE — 2060000000 HC ICU INTERMEDIATE R&B

## 2024-03-28 PROCEDURE — 6360000002 HC RX W HCPCS: Performed by: NURSE PRACTITIONER

## 2024-03-28 PROCEDURE — 94640 AIRWAY INHALATION TREATMENT: CPT

## 2024-03-28 RX ORDER — INSULIN LISPRO 100 [IU]/ML
0-16 INJECTION, SOLUTION INTRAVENOUS; SUBCUTANEOUS EVERY 4 HOURS
Status: DISCONTINUED | OUTPATIENT
Start: 2024-03-28 | End: 2024-03-29

## 2024-03-28 RX ADMIN — CARVEDILOL 6.25 MG: 6.25 TABLET, FILM COATED ORAL at 18:23

## 2024-03-28 RX ADMIN — INSULIN LISPRO 4 UNITS: 100 INJECTION, SOLUTION INTRAVENOUS; SUBCUTANEOUS at 12:45

## 2024-03-28 RX ADMIN — IPRATROPIUM BROMIDE AND ALBUTEROL SULFATE 1 DOSE: 2.5; .5 SOLUTION RESPIRATORY (INHALATION) at 07:51

## 2024-03-28 RX ADMIN — QUETIAPINE FUMARATE 50 MG: 25 TABLET ORAL at 21:03

## 2024-03-28 RX ADMIN — RANOLAZINE 1000 MG: 500 TABLET, FILM COATED, EXTENDED RELEASE ORAL at 21:03

## 2024-03-28 RX ADMIN — NITROGLYCERIN 0.4 MG: 0.4 TABLET, ORALLY DISINTEGRATING SUBLINGUAL at 08:19

## 2024-03-28 RX ADMIN — PANTOPRAZOLE SODIUM 40 MG: 40 TABLET, DELAYED RELEASE ORAL at 05:08

## 2024-03-28 RX ADMIN — BUPROPION HYDROCHLORIDE 150 MG: 150 TABLET, EXTENDED RELEASE ORAL at 08:34

## 2024-03-28 RX ADMIN — INSULIN LISPRO 8 UNITS: 100 INJECTION, SOLUTION INTRAVENOUS; SUBCUTANEOUS at 09:18

## 2024-03-28 RX ADMIN — LABETALOL HYDROCHLORIDE 10 MG: 5 INJECTION, SOLUTION INTRAVENOUS at 02:05

## 2024-03-28 RX ADMIN — MOMETASONE FUROATE AND FORMOTEROL FUMARATE DIHYDRATE 2 PUFF: 200; 5 AEROSOL RESPIRATORY (INHALATION) at 07:52

## 2024-03-28 RX ADMIN — INSULIN LISPRO 4 UNITS: 100 INJECTION, SOLUTION INTRAVENOUS; SUBCUTANEOUS at 05:14

## 2024-03-28 RX ADMIN — INSULIN GLARGINE 10 UNITS: 100 INJECTION, SOLUTION SUBCUTANEOUS at 09:19

## 2024-03-28 RX ADMIN — MONTELUKAST 10 MG: 10 TABLET, FILM COATED ORAL at 08:34

## 2024-03-28 RX ADMIN — WATER 1000 MG: 1 INJECTION INTRAMUSCULAR; INTRAVENOUS; SUBCUTANEOUS at 10:20

## 2024-03-28 RX ADMIN — BUPROPION HYDROCHLORIDE 150 MG: 150 TABLET, EXTENDED RELEASE ORAL at 21:03

## 2024-03-28 RX ADMIN — CARVEDILOL 6.25 MG: 6.25 TABLET, FILM COATED ORAL at 08:33

## 2024-03-28 RX ADMIN — ASPIRIN 81 MG 81 MG: 81 TABLET ORAL at 08:34

## 2024-03-28 RX ADMIN — PREDNISONE 40 MG: 20 TABLET ORAL at 08:33

## 2024-03-28 RX ADMIN — NIFEDIPINE 30 MG: 30 TABLET, EXTENDED RELEASE ORAL at 08:34

## 2024-03-28 RX ADMIN — SODIUM CHLORIDE: 9 INJECTION, SOLUTION INTRAVENOUS at 02:38

## 2024-03-28 RX ADMIN — MOMETASONE FUROATE AND FORMOTEROL FUMARATE DIHYDRATE 2 PUFF: 200; 5 AEROSOL RESPIRATORY (INHALATION) at 19:39

## 2024-03-28 RX ADMIN — ONDANSETRON 4 MG: 2 INJECTION INTRAMUSCULAR; INTRAVENOUS at 13:53

## 2024-03-28 RX ADMIN — MORPHINE SULFATE 4 MG: 4 INJECTION, SOLUTION INTRAMUSCULAR; INTRAVENOUS at 09:22

## 2024-03-28 RX ADMIN — MORPHINE SULFATE 4 MG: 4 INJECTION, SOLUTION INTRAMUSCULAR; INTRAVENOUS at 13:24

## 2024-03-28 RX ADMIN — SODIUM CHLORIDE, PRESERVATIVE FREE 10 ML: 5 INJECTION INTRAVENOUS at 21:03

## 2024-03-28 RX ADMIN — AMITRIPTYLINE HYDROCHLORIDE 40 MG: 10 TABLET, FILM COATED ORAL at 21:03

## 2024-03-28 RX ADMIN — ATORVASTATIN CALCIUM 80 MG: 80 TABLET, FILM COATED ORAL at 21:03

## 2024-03-28 RX ADMIN — RANOLAZINE 1000 MG: 500 TABLET, FILM COATED, EXTENDED RELEASE ORAL at 08:34

## 2024-03-28 RX ADMIN — IPRATROPIUM BROMIDE AND ALBUTEROL SULFATE 1 DOSE: 2.5; .5 SOLUTION RESPIRATORY (INHALATION) at 12:00

## 2024-03-28 RX ADMIN — INSULIN GLARGINE 10 UNITS: 100 INJECTION, SOLUTION SUBCUTANEOUS at 21:03

## 2024-03-28 RX ADMIN — MORPHINE SULFATE 4 MG: 4 INJECTION, SOLUTION INTRAMUSCULAR; INTRAVENOUS at 05:08

## 2024-03-28 RX ADMIN — ACETAMINOPHEN 325MG 650 MG: 325 TABLET ORAL at 21:11

## 2024-03-28 ASSESSMENT — PAIN DESCRIPTION - DESCRIPTORS
DESCRIPTORS: HEAVINESS
DESCRIPTORS: HEAVINESS;PRESSURE
DESCRIPTORS: HEAVINESS
DESCRIPTORS: HEAVINESS;PRESSURE
DESCRIPTORS: HEAVINESS
DESCRIPTORS: ACHING
DESCRIPTORS: HEAVINESS;PRESSURE

## 2024-03-28 ASSESSMENT — PAIN SCALES - GENERAL
PAINLEVEL_OUTOF10: 7
PAINLEVEL_OUTOF10: 2
PAINLEVEL_OUTOF10: 2
PAINLEVEL_OUTOF10: 9
PAINLEVEL_OUTOF10: 6
PAINLEVEL_OUTOF10: 5
PAINLEVEL_OUTOF10: 0
PAINLEVEL_OUTOF10: 5
PAINLEVEL_OUTOF10: 6
PAINLEVEL_OUTOF10: 8
PAINLEVEL_OUTOF10: 5
PAINLEVEL_OUTOF10: 6

## 2024-03-28 ASSESSMENT — PAIN - FUNCTIONAL ASSESSMENT
PAIN_FUNCTIONAL_ASSESSMENT: ACTIVITIES ARE NOT PREVENTED

## 2024-03-28 ASSESSMENT — PAIN DESCRIPTION - LOCATION
LOCATION: CHEST

## 2024-03-28 ASSESSMENT — PAIN DESCRIPTION - PAIN TYPE
TYPE: ACUTE PAIN

## 2024-03-28 ASSESSMENT — PAIN DESCRIPTION - ONSET
ONSET: ON-GOING

## 2024-03-28 ASSESSMENT — PAIN DESCRIPTION - FREQUENCY
FREQUENCY: CONTINUOUS
FREQUENCY: INTERMITTENT
FREQUENCY: CONTINUOUS
FREQUENCY: INTERMITTENT
FREQUENCY: CONTINUOUS
FREQUENCY: CONTINUOUS

## 2024-03-28 ASSESSMENT — PAIN DESCRIPTION - ORIENTATION
ORIENTATION: MID
ORIENTATION: RIGHT
ORIENTATION: MID
ORIENTATION: MID

## 2024-03-28 NOTE — PROGRESS NOTES
Pt arrived to floor via stretcher from ICU and ambulated to bed. Telemetry activated. Patient oriented to room and use of call light. Call light and personal items within reach. Admission and assessment initiated. POC and education initiated and reviewed with patient. Denied further needs or questions at this time. Will continue to monitor.     Electronically signed by ALLA PAREDES RN on 3/28/2024 at 1:44 PM

## 2024-03-28 NOTE — PROGRESS NOTES
Shift Summary: Pt condition remained stable for duration of shift. No bleeding noted around groin cath insertion site. Pt continued to report chest pain throughout the night.     Family updated: no    Most recent vitals: BP (!) 145/57   Pulse 68   Temp 98.3 °F (36.8 °C) (Oral)   Resp 13   Ht 1.524 m (5')   Wt 53.2 kg (117 lb 4.6 oz)   SpO2 100%   BMI 22.91 kg/m²      Rhythm: Normal Sinus Rhythm      NC/HFNC- 0 lpm  Respiratory support: - No ventilator support    Vent days: Day 0    Admission weight Weight - Scale: 53.4 kg (117 lb 11.6 oz)  Today's weight   Wt Readings from Last 1 Encounters:   03/28/24 53.2 kg (117 lb 4.6 oz)         UOP >30ml/hr: yes    Restraints: No  Order current and documentation up to date?    Lines/Drains reviewed @ bedside.  Peripheral IV 03/26/24 Right Antecubital (Active)   Number of days: 1     Saez need assessed each shift: no      Drip rates at handoff:    sodium chloride 75 mL/hr at 03/28/24 0605    dextrose      sodium chloride         Lab Data:   CBC:   Recent Labs     03/27/24  0434 03/28/24  0437   WBC 4.9 8.2   HGB 9.4* 9.0*   HCT 28.8* 27.3*   MCV 93.9 93.0    314     BMP:    Recent Labs     03/27/24  0718 03/28/24  0437    137   K 4.6 4.2   CO2 20* 18*   BUN 32* 31*   CREATININE 1.9* 1.5*     LIVR:   Recent Labs     03/26/24  1325   AST 15   ALT <5*     PT/INR:   Recent Labs     03/26/24  1325   PROT 7.2     APTT:   Recent Labs     03/26/24  1539   APTT 28.1     ABG: No results for input(s): \"PHART\", \"FRC5XYC\", \"PO2ART\" in the last 72 hours.    Any consults during the shift? NO    Any signed and held orders to be released?  NO        4 Eyes Skin Assessment     NAME:  Maren Meza  YOB: 1955  MEDICAL RECORD NUMBER:  [id number]    The patient is being assessed for  Shift Handoff    I agree that at least one RN has performed a thorough Head to Toe Skin Assessment on the patient. ALL assessment sites listed below have been assessed.      Areas

## 2024-03-28 NOTE — PLAN OF CARE
Problem: Discharge Planning  Goal: Discharge to home or other facility with appropriate resources  3/28/2024 1444 by Kali Sanchez RN  Outcome: Progressing     Problem: Pain  Goal: Verbalizes/displays adequate comfort level or baseline comfort level  3/28/2024 1444 by Kali Sanchez RN  Outcome: Progressing     Problem: Skin/Tissue Integrity  Goal: Absence of new skin breakdown  Description: 1.  Monitor for areas of redness and/or skin breakdown  2.  Assess vascular access sites hourly  3.  Every 4-6 hours minimum:  Change oxygen saturation probe site  4.  Every 4-6 hours:  If on nasal continuous positive airway pressure, respiratory therapy assess nares and determine need for appliance change or resting period.  3/28/2024 1444 by Kali Sanchez RN  Outcome: Progressing     Problem: ABCDS Injury Assessment  Goal: Absence of physical injury  3/28/2024 1444 by Kali Sanchez RN  Outcome: Progressing     Problem: Safety - Adult  Goal: Free from fall injury  3/28/2024 1444 by Kali Sanchez RN  Outcome: Progressing     Problem: Chronic Conditions and Co-morbidities  Goal: Patient's chronic conditions and co-morbidity symptoms are monitored and maintained or improved  3/28/2024 1444 by Kali Sanchez RN  Outcome: Progressing     Problem: Safety - Medical Restraint  Goal: Remains free of injury from restraints (Restraint for Interference with Medical Device)  Description: INTERVENTIONS:  1. Determine that other, less restrictive measures have been tried or would not be effective before applying the restraint  2. Evaluate the patient's condition at the time of restraint application  3. Inform patient/family regarding the reason for restraint  4. Q2H: Monitor safety, psychosocial status, comfort, nutrition and hydration  Outcome: Progressing

## 2024-03-28 NOTE — PROGRESS NOTES
4 Eyes Skin Assessment     NAME:  Maren Meza  YOB: 1955  MEDICAL RECORD NUMBER:  [id number]    The patient is being assessed for  Admission    I agree that at least one RN has performed a thorough Head to Toe Skin Assessment on the patient. ALL assessment sites listed below have been assessed.      Areas assessed by both nurses:    Head, Face, Ears, Shoulders, Back, Chest, Arms, Elbows, Hands, Sacrum. Buttock, Coccyx, Ischium, Legs. Feet and Heels, and Under Medical Devices         Does the Patient have a Wound? No noted wound(s)       Rakan Prevention initiated by RN: No  Wound Care Orders initiated by RN: No    Pressure Injury (Stage 3,4, Unstageable, DTI, NWPT, and Complex wounds) if present, place Wound referral order by RN under : No    New Ostomies, if present place, Ostomy referral order under : No     Nurse 1 eSignature: Electronically signed by ALLA PAREDES RN on 3/28/24 at 1:44 PM EDT    **SHARE this note so that the co-signing nurse can place an eSignature**    Nurse 2 eSignature: Electronically signed by Hilda Lazo RN on 3/28/24 at 3:26 PM EDT

## 2024-03-28 NOTE — PROGRESS NOTES
Report called to 5W RNKali. All questions answered. Patient updated on plan of care and transfer to [facility] and is agreeable. Will transfer via wheelchair with all belongings.    Electronically signed by Mini Velazquez RN on 3/28/2024 at 1:17 PM

## 2024-03-28 NOTE — PROGRESS NOTES
Pulmonary Progress Note    Date of Admission: 3/26/2024   LOS: 1 day       CC:  Chief Complaint   Patient presents with    Chest Pain     Patient presents to ED complaining of chest pain which started 2 hours ago. Patient reports having a hearty attack 2 weeks ago and states this feels very similarly. Reports some shortness of breath.        Subjective:  Continues to complains of chest pain        Assessment:     Hypertension  Hyperlipidemia  Fibromyalgia  CAD  Atrial fibrillation    Plan:     This note may have been transcribed using Dragon Dictation software. Please disregard any translational errors.       Hospital Day: 1     COPD with Asthma  DuoNebs  Prednisone  Appears to be at baseline.    Possible right upper lobe    Discussed with the patient.  She would like a CT chest today.  Ordered.      Chest pain  Continues to have pain.  Appears to be not cardiac.  CT chest to further assess.      Acute kidney injury on chronic kidney injury  Baseline GFR 37/creatinine 1.5  Return to baseline              Data:        PHYSICAL EXAM:   Blood pressure 124/83, pulse 78, temperature 98.5 °F (36.9 °C), temperature source Oral, resp. rate 16, height 1.524 m (5'), weight 53.2 kg (117 lb 4.6 oz), SpO2 100 %, not currently breastfeeding.'  Body mass index is 22.91 kg/m².   Gen: No distress.    ENT:   Resp: No accessory muscle use. No crackles. No wheezes. No rhonchi.    CV: Regular rate. Regular rhythm. No murmur or rub. No edema.   Skin: Warm, dry, normal texture and turgor. No nodule on exposed extremities.   M/S: No cyanosis. No clubbing. No joint deformity.  Psych: Oriented x 3. No anxiety.  Awake. Alert. Intact judgement and insight. Good Mood / Affect.  Memory appears in tact       Medications:    Scheduled Meds:   insulin lispro  0-16 Units SubCUTAneous Q4H    cefTRIAXone (ROCEPHIN) IV  1,000 mg IntraVENous Q24H    ranolazine  1,000 mg Oral BID    pantoprazole  40 mg Oral QAM AC    NIFEdipine  30 mg Oral Daily     part of this visit.    CT Chest w/ contrast: Results for orders placed in visit on 10/08/19    CT Chest W Contrast    Narrative  Site: [facility] #: [id number] #: [id number]: BNCVICAccount #: [id number] #: [id number] #: [id number]: CT CHEST W CONTRASTExam Date/Time: 10/08/2019 06:00 AMAdmitting Diagnosis: Other venous embolism and  thrombosisReason for Exam: Other venous embolism and thrombosis  Dictated by: MONICA TORRES EDWARDO: 10/08/2019 08:39 AMT: This document is confidential medical information.  Unauthorized disclosure or use of this information is prohibited by law.If you are not the intended recipient of this document, please advise  us by calling immediately [phone].  Impression/Conclusion below    100  HISTORY:   Other venous embolism and thrombosis Eval for venous vascular access  COMPARISON: August 28, 2008  TECHNIQUE: Postcontrast multiplanar CT images of the chest  NOTE:  If there are questions about the content of this report, please contact [facility]  radiology by calling [phone]    FINDINGS:  LUNGS/AIRWAYS:  Clear central airways.  Right apical scarring.  No focal consolidation.  No  suspicious mass.  PLEURA: Unremarkable.  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  Collaterals involving the right internal mammary and chest wall,  pericardiophrenic, and azygous/hemiazygos systems.  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    No acute disease in the chest.        SIGNED BY: Monica Torres MD on 10/8/2019  8:36 AM

## 2024-03-28 NOTE — PLAN OF CARE
Problem: Discharge Planning  Goal: Discharge to home or other facility with appropriate resources  Outcome: Progressing  Flowsheets (Taken 3/27/2024 2000)  Discharge to home or other facility with appropriate resources:   Identify barriers to discharge with patient and caregiver   Arrange for needed discharge resources and transportation as appropriate   Identify discharge learning needs (meds, wound care, etc)   Arrange for interpreters to assist at discharge as needed   Refer to discharge planning if patient needs post-hospital services based on physician order or complex needs related to functional status, cognitive ability or social support system     Problem: Pain  Goal: Verbalizes/displays adequate comfort level or baseline comfort level  Outcome: Progressing  Flowsheets (Taken 3/27/2024 2000)  Verbalizes/displays adequate comfort level or baseline comfort level:   Assess pain using appropriate pain scale   Encourage patient to monitor pain and request assistance   Administer analgesics based on type and severity of pain and evaluate response   Implement non-pharmacological measures as appropriate and evaluate response   Consider cultural and social influences on pain and pain management     Problem: Skin/Tissue Integrity  Goal: Absence of new skin breakdown  Description: 1.  Monitor for areas of redness and/or skin breakdown  2.  Assess vascular access sites hourly  3.  Every 4-6 hours minimum:  Change oxygen saturation probe site  4.  Every 4-6 hours:  If on nasal continuous positive airway pressure, respiratory therapy assess nares and determine need for appliance change or resting period.  Outcome: Progressing     Problem: ABCDS Injury Assessment  Goal: Absence of physical injury  Outcome: Progressing     Problem: Safety - Adult  Goal: Free from fall injury  Outcome: Progressing     Problem: Chronic Conditions and Co-morbidities  Goal: Patient's chronic conditions and co-morbidity symptoms are monitored  and maintained or improved  Outcome: Progressing  Flowsheets (Taken 3/27/2024 2000)  Care Plan - Patient's Chronic Conditions and Co-Morbidity Symptoms are Monitored and Maintained or Improved:   Monitor and assess patient's chronic conditions and comorbid symptoms for stability, deterioration, or improvement   Collaborate with multidisciplinary team to address chronic and comorbid conditions and prevent exacerbation or deterioration   Update acute care plan with appropriate goals if chronic or comorbid symptoms are exacerbated and prevent overall improvement and discharge

## 2024-03-28 NOTE — PROGRESS NOTES
Hospitalist Progress Note      PCP: Marin Cardenas MD    Date of Admission: 3/26/2024    Subjective: CP still there but better, transferred out of ICU    Medications:  Reviewed    Infusion Medications    dextrose      sodium chloride       Scheduled Medications    insulin lispro  0-16 Units SubCUTAneous Q4H    cefTRIAXone (ROCEPHIN) IV  1,000 mg IntraVENous Q24H    ranolazine  1,000 mg Oral BID    pantoprazole  40 mg Oral QAM AC    NIFEdipine  30 mg Oral Daily    montelukast  10 mg Oral Daily    linaclotide  290 mcg Oral QAM AC    insulin glargine  10 Units SubCUTAneous BID    mometasone-formoterol  2 puff Inhalation BID RT    carvedilol  6.25 mg Oral BID WC    buPROPion  150 mg Oral BID    atorvastatin  80 mg Oral Nightly    aspirin  81 mg Oral Daily    amitriptyline  40 mg Oral Nightly    sodium chloride flush  5-40 mL IntraVENous 2 times per day    predniSONE  40 mg Oral Daily     PRN Meds: QUEtiapine, glucose, dextrose bolus **OR** dextrose bolus, glucagon (rDNA), dextrose, ondansetron, sodium chloride flush, sodium chloride, polyethylene glycol, acetaminophen **OR** acetaminophen, albuterol, labetalol, morphine, nitroGLYCERIN, ipratropium 0.5 mg-albuterol 2.5 mg      Intake/Output Summary (Last 24 hours) at 3/28/2024 1638  Last data filed at 3/28/2024 1018  Gross per 24 hour   Intake 1653.57 ml   Output --   Net 1653.57 ml       Physical Exam Performed:    BP (!) 158/70   Pulse 77   Temp 98 °F (36.7 °C) (Oral)   Resp 14   Ht 1.524 m (5')   Wt 53.2 kg (117 lb 4.6 oz)   SpO2 100%   BMI 22.91 kg/m²     General appearance:  mild acute distress, appears older than stated age  Respiratory:minimal accessory muscle usage, Normal respiratory effort.  Wheezing  Cardiovascular:  Regular rate and rhythm, capillary refill 2 seconds  Abdomen: Soft, non-tender, non-distended with normal bowel sounds.  Musculoskeletal:  No clubbing, cyanosis. trace edema LE bilaterally.   Skin: turgor normal.  No new rashes or  exacerbation  DuoNebs, prednisone  Supportive care  Pulm consulted     Acute on chronic renal failure,  Nephrology consulted, appreciated  Hold losartan     Acute blood loss anemia  No signs or symptoms of bleeding  Continue to trend     RUL PNA - pulm consulted, considering CT chest to further evaluate, on abx     Type 2 Diabetes: Reviewed patient's medications. Plan is to hold oral medications and continued with reduced home dose of long acting and Insulin sliding scale     Hyperlipidemia: Controlled on home Statin. Outpatient PCP follow up post-discharge     Essential Hypertension: Reviewed patient's medications and plan is to continue home medication     GERD: Continue home medication     Paroxysmal Atrial fibrilliation: unspecified and clinically unable to determine etiology.  Controlled on home medication.  currently Anticoagulated and Monitored on tele     History of CVA with right-sided weakness: Chronic changes       Diet: ADULT DIET; Regular; 4 carb choices (60 gm/meal)  Code Status: Full Code  PT/OT Eval Status: ordered    Dispo - cont care        Eneida Burger MD

## 2024-03-28 NOTE — PROGRESS NOTES
Nephrology ([facility]) Progress Note    CC: CKD3b    Subjective:    HPI:  Breathing comfortably.  Still CP.  Renal function at baseline.  ROS:  In bed.  PMFSH:  medications reviewed.    Objective:  Blood pressure (!) 149/68, pulse 72, temperature 97.1 °F (36.2 °C), temperature source Axillary, resp. rate 11, height 1.524 m (5'), weight 53.2 kg (117 lb 4.6 oz), SpO2 100 %, not currently breastfeeding.    Intake/Output Summary (Last 24 hours) at 3/28/2024 1239  Last data filed at 3/28/2024 1018  Gross per 24 hour   Intake 1941.19 ml   Output --   Net 1941.19 ml     General:  NAD, A+Ox3  Chest:  CTAB  CVS:  RRR  Abdominal:  NTND, soft, +BS  Extremities:  no edema  Skin:  no rash    Labs:  Renal panel:  Lab Results   Component Value Date/Time     03/28/2024 04:37 AM    K 4.2 03/28/2024 04:37 AM    K 4.6 03/27/2024 07:18 AM    CO2 18 (L) 03/28/2024 04:37 AM    BUN 31 (H) 03/28/2024 04:37 AM    CREATININE 1.5 (H) 03/28/2024 04:37 AM    CALCIUM 8.9 03/28/2024 04:37 AM    PHOS 3.0 03/28/2024 04:37 AM    MG 2.60 (H) 03/28/2024 04:37 AM     CBC:  Lab Results   Component Value Date/Time    WBC 8.2 03/28/2024 04:37 AM    HGB 9.0 (L) 03/28/2024 04:37 AM    HCT 27.3 (L) 03/28/2024 04:37 AM     03/28/2024 04:37 AM       Assessment/Plan:  Reviewed old records and labs.    CKD 3b: baseline Cr ~ 1.5-1.8 mg/dL. Follows with Dr. Chung in office.               - Cr at baseline              - no evidence for LYNN   - d/c IVF              - Hold losartan for now              - Avoid hypotension. Avoid nephrotoxic agents when possible              - Daily RFTs     Chest Pain / CAD              - Trop 41>36>38              - LHC showed no significant disease     ?PNA              - CXR (3/26/2024): patchy opacities in the right lung base, favored to represent atelectasis              - On abx              - Pulmonology following  - chest CT today     COPD     Hypertension              - Continue current

## 2024-03-29 ENCOUNTER — APPOINTMENT (OUTPATIENT)
Dept: CT IMAGING | Age: 69
DRG: 193 | End: 2024-03-29
Attending: INTERNAL MEDICINE
Payer: COMMERCIAL

## 2024-03-29 ENCOUNTER — APPOINTMENT (OUTPATIENT)
Dept: MRI IMAGING | Age: 69
DRG: 193 | End: 2024-03-29
Payer: COMMERCIAL

## 2024-03-29 LAB
ALBUMIN SERPL-MCNC: 3.3 G/DL (ref 3.4–5)
ANION GAP SERPL CALCULATED.3IONS-SCNC: 12 MMOL/L (ref 3–16)
BASOPHILS # BLD: 0 K/UL (ref 0–0.2)
BASOPHILS NFR BLD: 0.1 %
BUN SERPL-MCNC: 41 MG/DL (ref 7–20)
CALCIUM SERPL-MCNC: 9.3 MG/DL (ref 8.3–10.6)
CHLORIDE SERPL-SCNC: 105 MMOL/L (ref 99–110)
CO2 SERPL-SCNC: 20 MMOL/L (ref 21–32)
CREAT SERPL-MCNC: 2.4 MG/DL (ref 0.6–1.2)
CRP SERPL-MCNC: 7.1 MG/L (ref 0–5.1)
DEPRECATED RDW RBC AUTO: 16.3 % (ref 12.4–15.4)
EOSINOPHIL # BLD: 0 K/UL (ref 0–0.6)
EOSINOPHIL NFR BLD: 0 %
GFR SERPLBLD CREATININE-BSD FMLA CKD-EPI: 22 ML/MIN/{1.73_M2}
GLUCOSE BLD-MCNC: 129 MG/DL (ref 70–99)
GLUCOSE BLD-MCNC: 177 MG/DL (ref 70–99)
GLUCOSE BLD-MCNC: 184 MG/DL (ref 70–99)
GLUCOSE BLD-MCNC: 303 MG/DL (ref 70–99)
GLUCOSE BLD-MCNC: 376 MG/DL (ref 70–99)
GLUCOSE SERPL-MCNC: 286 MG/DL (ref 70–99)
HCT VFR BLD AUTO: 25.3 % (ref 36–48)
HGB BLD-MCNC: 8.3 G/DL (ref 12–16)
LYMPHOCYTES # BLD: 0.7 K/UL (ref 1–5.1)
LYMPHOCYTES NFR BLD: 9.2 %
MAGNESIUM SERPL-MCNC: 2.1 MG/DL (ref 1.8–2.4)
MCH RBC QN AUTO: 30.6 PG (ref 26–34)
MCHC RBC AUTO-ENTMCNC: 32.6 G/DL (ref 31–36)
MCV RBC AUTO: 93.7 FL (ref 80–100)
MONOCYTES # BLD: 0.5 K/UL (ref 0–1.3)
MONOCYTES NFR BLD: 6.5 %
NEUTROPHILS # BLD: 6.8 K/UL (ref 1.7–7.7)
NEUTROPHILS NFR BLD: 84.2 %
PERFORMED ON: ABNORMAL
PHOSPHATE SERPL-MCNC: 3.5 MG/DL (ref 2.5–4.9)
PLATELET # BLD AUTO: 343 K/UL (ref 135–450)
PMV BLD AUTO: 7.2 FL (ref 5–10.5)
POTASSIUM SERPL-SCNC: 5 MMOL/L (ref 3.5–5.1)
RBC # BLD AUTO: 2.7 M/UL (ref 4–5.2)
SODIUM SERPL-SCNC: 137 MMOL/L (ref 136–145)
TSH SERPL DL<=0.005 MIU/L-ACNC: 1.01 UIU/ML (ref 0.27–4.2)
WBC # BLD AUTO: 8 K/UL (ref 4–11)

## 2024-03-29 PROCEDURE — 6370000000 HC RX 637 (ALT 250 FOR IP): Performed by: INTERNAL MEDICINE

## 2024-03-29 PROCEDURE — 97165 OT EVAL LOW COMPLEX 30 MIN: CPT

## 2024-03-29 PROCEDURE — 99232 SBSQ HOSP IP/OBS MODERATE 35: CPT | Performed by: INTERNAL MEDICINE

## 2024-03-29 PROCEDURE — 97535 SELF CARE MNGMENT TRAINING: CPT

## 2024-03-29 PROCEDURE — 97530 THERAPEUTIC ACTIVITIES: CPT | Performed by: PHYSICAL THERAPIST

## 2024-03-29 PROCEDURE — 70544 MR ANGIOGRAPHY HEAD W/O DYE: CPT

## 2024-03-29 PROCEDURE — 85025 COMPLETE CBC W/AUTO DIFF WBC: CPT

## 2024-03-29 PROCEDURE — 97162 PT EVAL MOD COMPLEX 30 MIN: CPT | Performed by: PHYSICAL THERAPIST

## 2024-03-29 PROCEDURE — 2580000003 HC RX 258: Performed by: INTERNAL MEDICINE

## 2024-03-29 PROCEDURE — 70551 MRI BRAIN STEM W/O DYE: CPT

## 2024-03-29 PROCEDURE — 6360000002 HC RX W HCPCS: Performed by: INTERNAL MEDICINE

## 2024-03-29 PROCEDURE — 97116 GAIT TRAINING THERAPY: CPT | Performed by: PHYSICAL THERAPIST

## 2024-03-29 PROCEDURE — 70547 MR ANGIOGRAPHY NECK W/O DYE: CPT

## 2024-03-29 PROCEDURE — 80069 RENAL FUNCTION PANEL: CPT

## 2024-03-29 PROCEDURE — 83735 ASSAY OF MAGNESIUM: CPT

## 2024-03-29 PROCEDURE — 94760 N-INVAS EAR/PLS OXIMETRY 1: CPT

## 2024-03-29 PROCEDURE — 2060000000 HC ICU INTERMEDIATE R&B

## 2024-03-29 PROCEDURE — 84443 ASSAY THYROID STIM HORMONE: CPT

## 2024-03-29 PROCEDURE — 36415 COLL VENOUS BLD VENIPUNCTURE: CPT

## 2024-03-29 PROCEDURE — 97530 THERAPEUTIC ACTIVITIES: CPT

## 2024-03-29 PROCEDURE — 86140 C-REACTIVE PROTEIN: CPT

## 2024-03-29 PROCEDURE — 70450 CT HEAD/BRAIN W/O DYE: CPT

## 2024-03-29 RX ORDER — SODIUM CHLORIDE 9 MG/ML
INJECTION, SOLUTION INTRAVENOUS CONTINUOUS
Status: DISCONTINUED | OUTPATIENT
Start: 2024-03-29 | End: 2024-03-31

## 2024-03-29 RX ORDER — LORAZEPAM 2 MG/ML
0.5 INJECTION INTRAMUSCULAR ONCE
Status: COMPLETED | OUTPATIENT
Start: 2024-03-29 | End: 2024-03-29

## 2024-03-29 RX ORDER — INSULIN LISPRO 100 [IU]/ML
0-16 INJECTION, SOLUTION INTRAVENOUS; SUBCUTANEOUS
Status: DISCONTINUED | OUTPATIENT
Start: 2024-03-29 | End: 2024-04-05

## 2024-03-29 RX ORDER — SODIUM CHLORIDE 0.9 % (FLUSH) 0.9 %
5-40 SYRINGE (ML) INJECTION EVERY 12 HOURS SCHEDULED
Status: DISCONTINUED | OUTPATIENT
Start: 2024-03-29 | End: 2024-04-05 | Stop reason: HOSPADM

## 2024-03-29 RX ORDER — ONDANSETRON 4 MG/1
4 TABLET, ORALLY DISINTEGRATING ORAL EVERY 8 HOURS PRN
Status: DISCONTINUED | OUTPATIENT
Start: 2024-03-29 | End: 2024-04-05 | Stop reason: HOSPADM

## 2024-03-29 RX ORDER — AZITHROMYCIN 250 MG/1
250 TABLET, FILM COATED ORAL DAILY
Status: DISCONTINUED | OUTPATIENT
Start: 2024-03-29 | End: 2024-04-05 | Stop reason: HOSPADM

## 2024-03-29 RX ORDER — SODIUM CHLORIDE 0.9 % (FLUSH) 0.9 %
5-40 SYRINGE (ML) INJECTION PRN
Status: DISCONTINUED | OUTPATIENT
Start: 2024-03-29 | End: 2024-04-05 | Stop reason: HOSPADM

## 2024-03-29 RX ORDER — LEVETIRACETAM 500 MG/5ML
750 INJECTION, SOLUTION, CONCENTRATE INTRAVENOUS ONCE
Status: COMPLETED | OUTPATIENT
Start: 2024-03-29 | End: 2024-03-29

## 2024-03-29 RX ORDER — POLYETHYLENE GLYCOL 3350 17 G/17G
17 POWDER, FOR SOLUTION ORAL DAILY PRN
Status: DISCONTINUED | OUTPATIENT
Start: 2024-03-29 | End: 2024-04-05 | Stop reason: HOSPADM

## 2024-03-29 RX ORDER — SODIUM CHLORIDE 9 MG/ML
INJECTION, SOLUTION INTRAVENOUS PRN
Status: DISCONTINUED | OUTPATIENT
Start: 2024-03-29 | End: 2024-04-05 | Stop reason: HOSPADM

## 2024-03-29 RX ORDER — LEVETIRACETAM 5 MG/ML
500 INJECTION INTRAVASCULAR EVERY 12 HOURS
Status: DISCONTINUED | OUTPATIENT
Start: 2024-03-30 | End: 2024-04-01

## 2024-03-29 RX ORDER — DOCUSATE SODIUM 100 MG/1
100 CAPSULE, LIQUID FILLED ORAL DAILY
Status: DISCONTINUED | OUTPATIENT
Start: 2024-03-29 | End: 2024-04-05 | Stop reason: HOSPADM

## 2024-03-29 RX ORDER — ONDANSETRON 2 MG/ML
4 INJECTION INTRAMUSCULAR; INTRAVENOUS EVERY 6 HOURS PRN
Status: DISCONTINUED | OUTPATIENT
Start: 2024-03-29 | End: 2024-04-05 | Stop reason: HOSPADM

## 2024-03-29 RX ADMIN — CARVEDILOL 6.25 MG: 6.25 TABLET, FILM COATED ORAL at 16:14

## 2024-03-29 RX ADMIN — DOCUSATE SODIUM 100 MG: 100 CAPSULE, LIQUID FILLED ORAL at 16:14

## 2024-03-29 RX ADMIN — INSULIN LISPRO 12 UNITS: 100 INJECTION, SOLUTION INTRAVENOUS; SUBCUTANEOUS at 08:28

## 2024-03-29 RX ADMIN — SODIUM CHLORIDE, PRESERVATIVE FREE 10 ML: 5 INJECTION INTRAVENOUS at 08:27

## 2024-03-29 RX ADMIN — NIFEDIPINE 30 MG: 30 TABLET, EXTENDED RELEASE ORAL at 08:26

## 2024-03-29 RX ADMIN — SODIUM CHLORIDE: 9 INJECTION, SOLUTION INTRAVENOUS at 18:19

## 2024-03-29 RX ADMIN — INSULIN GLARGINE 10 UNITS: 100 INJECTION, SOLUTION SUBCUTANEOUS at 20:30

## 2024-03-29 RX ADMIN — WATER 1000 MG: 1 INJECTION INTRAMUSCULAR; INTRAVENOUS; SUBCUTANEOUS at 10:59

## 2024-03-29 RX ADMIN — LEVETIRACETAM 750 MG: 100 INJECTION, SOLUTION INTRAVENOUS at 16:16

## 2024-03-29 RX ADMIN — ASPIRIN 81 MG 81 MG: 81 TABLET ORAL at 08:26

## 2024-03-29 RX ADMIN — BUPROPION HYDROCHLORIDE 150 MG: 150 TABLET, EXTENDED RELEASE ORAL at 20:31

## 2024-03-29 RX ADMIN — ATORVASTATIN CALCIUM 80 MG: 80 TABLET, FILM COATED ORAL at 20:31

## 2024-03-29 RX ADMIN — LORAZEPAM 0.5 MG: 2 INJECTION INTRAMUSCULAR; INTRAVENOUS at 16:20

## 2024-03-29 RX ADMIN — INSULIN GLARGINE 10 UNITS: 100 INJECTION, SOLUTION SUBCUTANEOUS at 08:27

## 2024-03-29 RX ADMIN — ONDANSETRON 4 MG: 2 INJECTION INTRAMUSCULAR; INTRAVENOUS at 10:34

## 2024-03-29 RX ADMIN — ACETAMINOPHEN 325MG 650 MG: 325 TABLET ORAL at 11:11

## 2024-03-29 RX ADMIN — PREDNISONE 40 MG: 20 TABLET ORAL at 08:25

## 2024-03-29 RX ADMIN — AMITRIPTYLINE HYDROCHLORIDE 40 MG: 10 TABLET, FILM COATED ORAL at 20:31

## 2024-03-29 RX ADMIN — RANOLAZINE 1000 MG: 500 TABLET, FILM COATED, EXTENDED RELEASE ORAL at 08:26

## 2024-03-29 RX ADMIN — RANOLAZINE 1000 MG: 500 TABLET, FILM COATED, EXTENDED RELEASE ORAL at 20:31

## 2024-03-29 RX ADMIN — CARVEDILOL 6.25 MG: 6.25 TABLET, FILM COATED ORAL at 08:26

## 2024-03-29 RX ADMIN — MONTELUKAST 10 MG: 10 TABLET, FILM COATED ORAL at 08:25

## 2024-03-29 RX ADMIN — PANTOPRAZOLE SODIUM 40 MG: 40 TABLET, DELAYED RELEASE ORAL at 06:10

## 2024-03-29 RX ADMIN — AZITHROMYCIN 250 MG: 250 TABLET, FILM COATED ORAL at 13:18

## 2024-03-29 RX ADMIN — BUPROPION HYDROCHLORIDE 150 MG: 150 TABLET, EXTENDED RELEASE ORAL at 08:26

## 2024-03-29 ASSESSMENT — PAIN - FUNCTIONAL ASSESSMENT: PAIN_FUNCTIONAL_ASSESSMENT: ACTIVITIES ARE NOT PREVENTED

## 2024-03-29 ASSESSMENT — PAIN DESCRIPTION - LOCATION: LOCATION: OTHER (COMMENT)

## 2024-03-29 ASSESSMENT — PAIN SCALES - GENERAL
PAINLEVEL_OUTOF10: 4
PAINLEVEL_OUTOF10: 0
PAINLEVEL_OUTOF10: 9

## 2024-03-29 ASSESSMENT — PAIN DESCRIPTION - DESCRIPTORS: DESCRIPTORS: SQUEEZING

## 2024-03-29 ASSESSMENT — PAIN DESCRIPTION - ORIENTATION: ORIENTATION: LEFT

## 2024-03-29 NOTE — PROGRESS NOTES
Pt starting to awake and answers name. Pt then appears to go back to sleep. Pt able to follow some commands. NIH difficult due to responsiveness. MRI check list completed with family. Pt drooling from right side of face. Pt to CT then MRI. Received call from [facility]. Sent call to Dr Carney. To MRI. Spoke with family again for update and they will be heading to hospital. Electronically signed by Sara Ashley RN on 3/29/2024 at 9:52 AM

## 2024-03-29 NOTE — PROGRESS NOTES
Nephrology ([facility]) Progress Note    CC: CKD3b    Subjective:    HPI:  Breathing comfortably.  Still CP.  Had rapid response this AM from lethargy, but currently better and interactive.  Renal function worse.  Reports poor PO intake  ROS:  In bed.  PMFSH:  medications reviewed.    Objective:  Blood pressure (!) 159/77, pulse 89, temperature 98.4 °F (36.9 °C), temperature source Oral, resp. rate 19, height 1.524 m (5'), weight 49.7 kg (109 lb 9.1 oz), SpO2 100 %, not currently breastfeeding.    Intake/Output Summary (Last 24 hours) at 3/29/2024 1629  Last data filed at 3/29/2024 1519  Gross per 24 hour   Intake 720 ml   Output 600 ml   Net 120 ml       General:  NAD, A+Ox3  Chest:  CTAB  CVS:  RRR  Abdominal:  NTND, soft, +BS  Extremities:  no edema  Skin:  no rash    Labs:  Renal panel:  Lab Results   Component Value Date/Time     03/29/2024 05:30 AM    K 5.0 03/29/2024 05:30 AM    K 4.6 03/27/2024 07:18 AM    CO2 20 (L) 03/29/2024 05:30 AM    BUN 41 (H) 03/29/2024 05:30 AM    CREATININE 2.4 (H) 03/29/2024 05:30 AM    CALCIUM 9.3 03/29/2024 05:30 AM    PHOS 3.5 03/29/2024 05:30 AM    MG 2.10 03/29/2024 05:30 AM     CBC:  Lab Results   Component Value Date/Time    WBC 8.0 03/29/2024 05:30 AM    HGB 8.3 (L) 03/29/2024 05:30 AM    HCT 25.3 (L) 03/29/2024 05:30 AM     03/29/2024 05:30 AM       Assessment/Plan:  Reviewed old records and labs.    CKD 3b: baseline Cr ~ 1.5-1.8 mg/dL. Follows with Dr. Chung in office.             - renal function worse              - posily secondary  LYNN   - restart IVF              - Hold losartan for now              - Avoid hypotension. Avoid nephrotoxic agents when possible              - Daily RFTs     Chest Pain / CAD              - Trop 41>36>38              - LHC showed no significant disease     ?PNA              - CXR (3/26/2024): patchy opacities in the right lung base, favored to represent atelectasis              - On abx              -

## 2024-03-29 NOTE — PROGRESS NOTES
0905 PCT came out room stating patient appeared to be having seizure. Sara Ashley RN straight to patient's room with this RN following behind.   Patient unresponsive with some jerking movements.   0906 Rapid response called.    MD Carney to bedside. New orders placed. Patient to head CT stat. Stroke team called.  MD Burger updated.     YOLETTE Ruiz attempting to call family for update and MRI questions. No answer. Will attempt again.    Electronically signed by Opal Romero RN on 3/29/2024 at 9:37 AM

## 2024-03-29 NOTE — PLAN OF CARE
Problem: Pain  Goal: Verbalizes/displays adequate comfort level or baseline comfort level  3/29/2024 0051 by Venu Castellano, RN  Outcome: Progressing     Problem: Safety - Adult  Goal: Free from fall injury  3/29/2024 0051 by Venu Castellano, RN  Outcome: Progressing     Problem: Respiratory - Adult  Goal: Achieves optimal ventilation and oxygenation  Outcome: Progressing  Flowsheets (Taken 3/29/2024 0052)  Achieves optimal ventilation and oxygenation:   Assess for changes in respiratory status   Assess for changes in mentation and behavior   Position to facilitate oxygenation and minimize respiratory effort     Problem: Cardiovascular - Adult  Goal: Maintains optimal cardiac output and hemodynamic stability  Outcome: Progressing  Flowsheets (Taken 3/29/2024 0052)  Maintains optimal cardiac output and hemodynamic stability:   Monitor blood pressure and heart rate   Monitor urine output and notify Licensed Independent Practitioner for values outside of normal range   Assess for signs of decreased cardiac output

## 2024-03-29 NOTE — PROGRESS NOTES
Pt with constipation since 3/22. Pt refusing Miralax. Updated Eneida Burger on home medications of Colace/Linzess. Electronically signed by Sara Ashley RN on 3/29/2024 at 3:24 PM

## 2024-03-29 NOTE — PROGRESS NOTES
SLP NOTE    9:49 AM:  Evaluation order received. Patient unavailable at this time at MRI; RN reports concern for reduced level of alertness at this time as well. ST to re-attempt evaluation as schedule permits pending patient's status unless otherwise notified.    5:14 PM:  Evaluation re-attempted. Patient unavailable d/t unable to adequate awaken. RN reports Ativan administration for upcoming Brain MRI. ST to re-attempt as schedule permits at a later date unless otherwise notified.    If SLP eval/tx is deemed no longer indicated as medical work-up progresses, please discontinue SLP eval/tx order.    Thank you.  Jessica Bryant MA, CCC-SLP, #[id number]  Speech-Language Pathologist  Portable phone: [phone]

## 2024-03-29 NOTE — PROGRESS NOTES
Pt returned from CT & MRI. Emesis x 3. PRN Zofran given. BP-206/84. Dr Eneida Burger updated. Electronically signed by Sara Ashley RN on 3/29/2024 at 11:09 AM

## 2024-03-29 NOTE — PROGRESS NOTES
Hospitalist Progress Note      PCP: Latonia Herrera MD    Date of Admission: 3/26/2024    Subjective: pt had an episode of ?seizures this am, had a rapid response called, more awake and alert now, daughters at bedside    Medications:  Reviewed    Infusion Medications    sodium chloride      sodium chloride      dextrose      sodium chloride       Scheduled Medications    insulin lispro  0-16 Units SubCUTAneous 4x Daily WC    sodium chloride flush  5-40 mL IntraVENous 2 times per day    azithromycin  250 mg Oral Daily    docusate sodium  100 mg Oral Daily    [START ON 3/30/2024] levETIRAcetam  500 mg IntraVENous Q12H    cefTRIAXone (ROCEPHIN) IV  1,000 mg IntraVENous Q24H    ranolazine  1,000 mg Oral BID    pantoprazole  40 mg Oral QAM AC    NIFEdipine  30 mg Oral Daily    montelukast  10 mg Oral Daily    linaclotide  290 mcg Oral QAM AC    insulin glargine  10 Units SubCUTAneous BID    mometasone-formoterol  2 puff Inhalation BID RT    carvedilol  6.25 mg Oral BID WC    buPROPion  150 mg Oral BID    atorvastatin  80 mg Oral Nightly    aspirin  81 mg Oral Daily    amitriptyline  40 mg Oral Nightly    sodium chloride flush  5-40 mL IntraVENous 2 times per day    predniSONE  40 mg Oral Daily     PRN Meds: sodium chloride flush, sodium chloride, ondansetron **OR** ondansetron, polyethylene glycol, QUEtiapine, glucose, dextrose bolus **OR** dextrose bolus, glucagon (rDNA), dextrose, sodium chloride flush, sodium chloride, acetaminophen **OR** acetaminophen, albuterol, labetalol, morphine, nitroGLYCERIN, ipratropium 0.5 mg-albuterol 2.5 mg      Intake/Output Summary (Last 24 hours) at 3/29/2024 1731  Last data filed at 3/29/2024 1519  Gross per 24 hour   Intake 720 ml   Output 600 ml   Net 120 ml       Physical Exam Performed:    BP (!) 159/77   Pulse 89   Temp 98.4 °F (36.9 °C) (Oral)   Resp 19   Ht 1.524 m (5')   Wt 49.7 kg (109 lb 9.1 oz)   SpO2 100%   BMI 21.40 kg/m²     General appearance:  mild acute

## 2024-03-29 NOTE — CONSULTS
volume loss with minimal chronic microvascular  ischemic changes.  3. Chronic lacunar infarct within the right shahnaz.  4. Minimal cerebellar tonsillar ectopia.  5. No significant stenosis or large vessel occlusion of the dmxicr-tb-Swjavx.  6. No convincing flow limiting stenosis of the visualized cervical  carotid/vertebral arteries.  7. Suggestion of less than 50% stenosis of the proximal right cervical ICA by  NASCET criteria.                     CT Head w/o contrast 3/29/24.  Independently reviewed images.  IMPRESSION:  No acute intracranial process.    IMPRESSION:  1. Witnessed seizure like activity  2. Hx of R pontine infact  3. Atypical chest pain  4. Afib, on Brilinta  5.  CKD  6.  Meningioma    Maren Meza is a 68 y.o. female who presented with chest pain several days ago.  Neurology was consulted today after a witnessed seizure like event with GTC activity for 15 seconds.  Patient reportedly sleepy afterward.    RECOMMENDATIONS:  -Labs:  TSH w/reflex, UA w/reflex (ordered)  -rEEG  -Inpatient seizure precautions.  -No obvious provoking factors so far, but no AED indicated at this time.  -Ativan 1 mg IVP Q5min PRN for any seizure activity greater than 5 minutes.  -For any witnessed seizure activity please record the following and notify neurology:  duration of episode, body part(s) involved, whether eyes are open/closed/deviated, any tongue bite or incontinence, any PRN medications used to abort the event.  -Will discuss with neurology attending physician, Dr. Latonia Herrera.  See attestation for additional recommendations.    Fe Peters, [facility] Neurology    A copy of this note was provided for Eneida Velasco MD

## 2024-03-29 NOTE — PROGRESS NOTES
Occupational Therapy  Facility/Department: [address] PROGRESSIVE CARE  Occupational Therapy Initial Assessment    Name: Maren Meza  : 1955  MRN: [id number]  Date of Service: 3/29/2024    Discharge Recommendations:  Home with assist PRN, Home with Home health OT  OT Equipment Recommendations  Equipment Needed: No     Maren Meza scored a 19/24 on the AM-PAC ADL Inpatient form. Current research shows that an AM-PAC score of 18 or greater is typically associated with a discharge to the patient's home setting. Based on the patient's AM-PAC score, and their current ADL deficits, it is recommended that the patient have 2-3 sessions per week of Occupational Therapy at d/c to increase the patient's independence.  At this time, this patient demonstrates the endurance and safety to discharge home with home therapy and a follow up treatment frequency of 2-3x/wk.   Please see assessment section for further patient specific details.    If patient discharges prior to next session this note will serve as a discharge summary.  Please see below for the latest assessment towards goals.      Patient Diagnosis(es): The primary encounter diagnosis was Chest pain, unspecified type. Diagnoses of Elevated troponin and Acute kidney injury superimposed on chronic kidney disease (HCC) were also pertinent to this visit.  Past Medical History:  has a past medical history of Acid reflux, Anemia, Anxiety and depression, Arthritis, Asthma, Atrial fibrillation ([facility]), CAD (coronary artery disease), Cerebral artery occlusion with cerebral infarction ([facility]), CHF (congestive heart failure) ([facility]), Chronic kidney disease--stage III, COPD (chronic obstructive pulmonary disease) ([facility]), DM2 (diabetes mellitus, type 2) ([facility]), Dysarthria, Fibromyalgia, Headache(784.0), Hemisensory loss, History of blood transfusion, Hyperlipidemia, Hypertension, IBS (irritable bowel syndrome), Inferior vena cava occlusion (HCC), Keratitis, Meningioma (HCC), MI,  eating meals?: None  AM-PAC Inpatient Daily Activity Raw Score: 19  AM-PAC Inpatient ADL T-Scale Score : 40.22  ADL Inpatient CMS 0-100% Score: 42.8  ADL Inpatient CMS G-Code Modifier : CK    Goals  Short Term Goals  Time Frame for Short Term Goals: Prior to DC:  Short Term Goal 1: Pt will complete ADL transfers/mobility with supervision  Short Term Goal 2: Pt will tolerate standing > 5 min for functional task with supervision  Short Term Goal 3: Pt will complete LB Dressing with supervision  Short Term Goal 4: Pt will complete toileting with supervision  Patient Goals   Patient goals : to return home       Therapy Time   Individual Concurrent Group Co-treatment   Time In 1325         Time Out 1405         Minutes 40         Timed Code Treatment Minutes: 25 Minutes     ,This note to serve as OT d/c summary if pt is d/c-ed prior to next therapy session.    Telma Atkinson, OTR/L

## 2024-03-29 NOTE — PROGRESS NOTES
Pulmonary Critical Care Progress Note     Patient's name:  Maren Meza  Medical Record Number: [id number]  Patient's account/billing number: [id number]  Patient's YOB: 1955  Age: 68 y.o.  Date of Admission: 3/26/2024 12:36 PM  Date of Consult: 3/29/2024      Primary Care Physician: Binta Beard      Code Status: Full Code    Chief complaint: COPD/asthma with exacerbation    Assessment and Plan     COPD/asthma exacerbation  Atypical pneumonia    Plan:  Prednisone for 5 days.  Azithromycin 5 days.  CT scan finding consistent with inflammatory/infectious infiltrates  Dulera and DuoNeb        Overnight:  No acute events overnight  ? Seizures this morning    REVIEW OF SYSTEMS:  Review of Systems -   General ROS: negative  Psychological ROS: negative  Ophthalmic ROS: negative  ENT ROS: negative  Allergy and Immunology ROS: negative  Hematological and Lymphatic ROS: negative  Endocrine ROS: negative  Breast ROS: negative  Respiratory ROS: no cough, shortness of breath, or wheezing  Cardiovascular ROS: no chest pain or dyspnea on exertion  Gastrointestinal ROS:negative  Genito-Urinary ROS: negative  Musculoskeletal ROS: negative  Neurological ROS: negative  Dermatological ROS: negative        Physical Exam:    Vitals: BP (!) 206/84   Pulse 94   Temp 97.9 °F (36.6 °C) (Oral)   Resp 17   Ht 1.524 m (5')   Wt 49.7 kg (109 lb 9.1 oz)   SpO2 100%   BMI 21.40 kg/m²     Last Body weight:   Wt Readings from Last 3 Encounters:   03/29/24 49.7 kg (109 lb 9.1 oz)   06/09/23 60.3 kg (133 lb)   05/08/23 60.3 kg (133 lb)       Body Mass Index : Body mass index is 21.4 kg/m².      Intake and Output summary:   Intake/Output Summary (Last 24 hours) at 3/29/2024 1159  Last data filed at 3/28/2024 2336  Gross per 24 hour   Intake 480 ml   Output --   Net 480 ml       Physical Examination:     Gen:  No acute distress.  Eyes: PERRL. Anicteric sclera. No conjunctival injection.   ENT: No discharge. Posterior  oropharynx clear. External appearance of ears and nose normal.  Neck: Trachea midline. No mass   Resp:  diminished with rhonchi   CV: Regular rate. Regular rhythm. No murmur or rub. No edema.   GI: Soft, Non-tender. Non-distended. +BS  Skin: Warm, dry, w/o erythema.   Lymph: No cervical or supraclavicular LAD.   M/S: No cyanosis. No clubbing.    Neuro:  CN 2-12 tested, no focal neurologic deficit.  Moves all extremities  Psych: Awake and alert, Oriented x 3.  Judgement and insight appropriate.  Mood stable.        Laboratory findings:-    CBC:   Recent Labs     03/29/24  0530   WBC 8.0   HGB 8.3*        BMP:    Recent Labs     03/27/24  0718 03/27/24  0718 03/28/24  0437 03/29/24  0530     --  137 137   K 4.6   < > 4.2 5.0     --  104 105   CO2 20*  --  18* 20*   BUN 32*  --  31* 41*   CREATININE 1.9*  --  1.5* 2.4*   GLUCOSE 272*  --  238* 286*    < > = values in this interval not displayed.     S. Calcium:  Recent Labs     03/29/24  0530   CALCIUM 9.3     S. Ionized Calcium:No results for input(s): \"IONCA\" in the last 72 hours.  S. Magnesium:  Recent Labs     03/29/24  0530   MG 2.10     S. Phosphorus:  Recent Labs     03/29/24  0530   PHOS 3.5     S. Glucose:  Recent Labs     03/29/24  0815 03/29/24  0906 03/29/24  1149   POCGLU 303* 376* 184*           Radiology Review:  Pertinent images / reports were reviewed as a part of this visit.                 Anupama Chappell MD, [facility].            3/29/2024, 11:59 AM

## 2024-03-29 NOTE — PROGRESS NOTES
Spoke to patient's daughter Reinier via telephone at patient's request. Updated her on MRI findings and current plan which includes doing an MRI with contrast.  We will also be starting her on Keppra.  Discussed possible side effects and to report any mood changes.  Also discussed outpatient seizure precautions in detail.  Patient does not drive.  She has a shower chair and does not take baths; her son is usually visiting when she bathes.  She does not watch small children or other's who would be unable to call for help.    Reinier expressed appreciation for the call.

## 2024-03-30 LAB
ALBUMIN SERPL-MCNC: 3.3 G/DL (ref 3.4–5)
ANION GAP SERPL CALCULATED.3IONS-SCNC: 11 MMOL/L (ref 3–16)
BASOPHILS # BLD: 0 K/UL (ref 0–0.2)
BASOPHILS NFR BLD: 0.5 %
BUN SERPL-MCNC: 35 MG/DL (ref 7–20)
CALCIUM SERPL-MCNC: 9.3 MG/DL (ref 8.3–10.6)
CHLORIDE SERPL-SCNC: 106 MMOL/L (ref 99–110)
CHOLEST SERPL-MCNC: 165 MG/DL (ref 0–199)
CO2 SERPL-SCNC: 21 MMOL/L (ref 21–32)
CREAT SERPL-MCNC: 2.2 MG/DL (ref 0.6–1.2)
DEPRECATED RDW RBC AUTO: 16.5 % (ref 12.4–15.4)
EOSINOPHIL # BLD: 0 K/UL (ref 0–0.6)
EOSINOPHIL NFR BLD: 0 %
EST. AVERAGE GLUCOSE BLD GHB EST-MCNC: 205.9 MG/DL
GFR SERPLBLD CREATININE-BSD FMLA CKD-EPI: 24 ML/MIN/{1.73_M2}
GLUCOSE BLD-MCNC: 116 MG/DL (ref 70–99)
GLUCOSE BLD-MCNC: 116 MG/DL (ref 70–99)
GLUCOSE BLD-MCNC: 117 MG/DL (ref 70–99)
GLUCOSE BLD-MCNC: 197 MG/DL (ref 70–99)
GLUCOSE BLD-MCNC: 98 MG/DL (ref 70–99)
GLUCOSE SERPL-MCNC: 66 MG/DL (ref 70–99)
HBA1C MFR BLD: 8.8 %
HCT VFR BLD AUTO: 25.1 % (ref 36–48)
HDLC SERPL-MCNC: 68 MG/DL (ref 40–60)
HGB BLD-MCNC: 8 G/DL (ref 12–16)
LDLC SERPL CALC-MCNC: 83 MG/DL
LYMPHOCYTES # BLD: 1.6 K/UL (ref 1–5.1)
LYMPHOCYTES NFR BLD: 15.3 %
MAGNESIUM SERPL-MCNC: 2.2 MG/DL (ref 1.8–2.4)
MCH RBC QN AUTO: 29.8 PG (ref 26–34)
MCHC RBC AUTO-ENTMCNC: 31.9 G/DL (ref 31–36)
MCV RBC AUTO: 93.6 FL (ref 80–100)
MONOCYTES # BLD: 0.7 K/UL (ref 0–1.3)
MONOCYTES NFR BLD: 6.4 %
NEUTROPHILS # BLD: 8.3 K/UL (ref 1.7–7.7)
NEUTROPHILS NFR BLD: 77.8 %
PERFORMED ON: ABNORMAL
PERFORMED ON: NORMAL
PHOSPHATE SERPL-MCNC: 3.1 MG/DL (ref 2.5–4.9)
PLATELET # BLD AUTO: 356 K/UL (ref 135–450)
PMV BLD AUTO: 7.3 FL (ref 5–10.5)
POTASSIUM SERPL-SCNC: 4.4 MMOL/L (ref 3.5–5.1)
RBC # BLD AUTO: 2.68 M/UL (ref 4–5.2)
SLIDE REVIEW: ABNORMAL
SODIUM SERPL-SCNC: 138 MMOL/L (ref 136–145)
TRIGL SERPL-MCNC: 71 MG/DL (ref 0–150)
VLDLC SERPL CALC-MCNC: 14 MG/DL
WBC # BLD AUTO: 10.7 K/UL (ref 4–11)

## 2024-03-30 PROCEDURE — 94760 N-INVAS EAR/PLS OXIMETRY 1: CPT

## 2024-03-30 PROCEDURE — 6360000002 HC RX W HCPCS: Performed by: NURSE PRACTITIONER

## 2024-03-30 PROCEDURE — 6370000000 HC RX 637 (ALT 250 FOR IP): Performed by: INTERNAL MEDICINE

## 2024-03-30 PROCEDURE — 2580000003 HC RX 258: Performed by: INTERNAL MEDICINE

## 2024-03-30 PROCEDURE — 83036 HEMOGLOBIN GLYCOSYLATED A1C: CPT

## 2024-03-30 PROCEDURE — 36415 COLL VENOUS BLD VENIPUNCTURE: CPT

## 2024-03-30 PROCEDURE — 80069 RENAL FUNCTION PANEL: CPT

## 2024-03-30 PROCEDURE — 80061 LIPID PANEL: CPT

## 2024-03-30 PROCEDURE — 85025 COMPLETE CBC W/AUTO DIFF WBC: CPT

## 2024-03-30 PROCEDURE — 94640 AIRWAY INHALATION TREATMENT: CPT

## 2024-03-30 PROCEDURE — 6360000002 HC RX W HCPCS: Performed by: INTERNAL MEDICINE

## 2024-03-30 PROCEDURE — 99232 SBSQ HOSP IP/OBS MODERATE 35: CPT | Performed by: INTERNAL MEDICINE

## 2024-03-30 PROCEDURE — 92610 EVALUATE SWALLOWING FUNCTION: CPT

## 2024-03-30 PROCEDURE — 2060000000 HC ICU INTERMEDIATE R&B

## 2024-03-30 PROCEDURE — 83735 ASSAY OF MAGNESIUM: CPT

## 2024-03-30 RX ADMIN — TICAGRELOR 90 MG: 90 TABLET ORAL at 21:09

## 2024-03-30 RX ADMIN — DOCUSATE SODIUM 100 MG: 100 CAPSULE, LIQUID FILLED ORAL at 08:16

## 2024-03-30 RX ADMIN — WATER 1000 MG: 1 INJECTION INTRAMUSCULAR; INTRAVENOUS; SUBCUTANEOUS at 09:40

## 2024-03-30 RX ADMIN — PANTOPRAZOLE SODIUM 40 MG: 40 TABLET, DELAYED RELEASE ORAL at 05:34

## 2024-03-30 RX ADMIN — INSULIN GLARGINE 10 UNITS: 100 INJECTION, SOLUTION SUBCUTANEOUS at 21:12

## 2024-03-30 RX ADMIN — MONTELUKAST 10 MG: 10 TABLET, FILM COATED ORAL at 08:16

## 2024-03-30 RX ADMIN — LEVETIRACETAM 500 MG: 5 INJECTION, SOLUTION INTRAVENOUS at 12:25

## 2024-03-30 RX ADMIN — BUPROPION HYDROCHLORIDE 150 MG: 150 TABLET, EXTENDED RELEASE ORAL at 21:10

## 2024-03-30 RX ADMIN — AMITRIPTYLINE HYDROCHLORIDE 40 MG: 10 TABLET, FILM COATED ORAL at 21:09

## 2024-03-30 RX ADMIN — CARVEDILOL 6.25 MG: 6.25 TABLET, FILM COATED ORAL at 08:16

## 2024-03-30 RX ADMIN — BUPROPION HYDROCHLORIDE 150 MG: 150 TABLET, EXTENDED RELEASE ORAL at 08:16

## 2024-03-30 RX ADMIN — SODIUM CHLORIDE, PRESERVATIVE FREE 10 ML: 5 INJECTION INTRAVENOUS at 21:12

## 2024-03-30 RX ADMIN — SODIUM CHLORIDE: 9 INJECTION, SOLUTION INTRAVENOUS at 08:16

## 2024-03-30 RX ADMIN — SODIUM CHLORIDE, PRESERVATIVE FREE 10 ML: 5 INJECTION INTRAVENOUS at 09:40

## 2024-03-30 RX ADMIN — RANOLAZINE 1000 MG: 500 TABLET, FILM COATED, EXTENDED RELEASE ORAL at 08:16

## 2024-03-30 RX ADMIN — LEVETIRACETAM 500 MG: 5 INJECTION, SOLUTION INTRAVENOUS at 00:25

## 2024-03-30 RX ADMIN — AZITHROMYCIN 250 MG: 250 TABLET, FILM COATED ORAL at 08:16

## 2024-03-30 RX ADMIN — ATORVASTATIN CALCIUM 80 MG: 80 TABLET, FILM COATED ORAL at 21:10

## 2024-03-30 RX ADMIN — PREDNISONE 40 MG: 20 TABLET ORAL at 08:16

## 2024-03-30 RX ADMIN — LEVETIRACETAM 500 MG: 5 INJECTION, SOLUTION INTRAVENOUS at 23:48

## 2024-03-30 RX ADMIN — NIFEDIPINE 30 MG: 30 TABLET, EXTENDED RELEASE ORAL at 08:16

## 2024-03-30 RX ADMIN — ASPIRIN 81 MG 81 MG: 81 TABLET ORAL at 08:16

## 2024-03-30 RX ADMIN — CARVEDILOL 6.25 MG: 6.25 TABLET, FILM COATED ORAL at 17:28

## 2024-03-30 RX ADMIN — INSULIN GLARGINE 10 UNITS: 100 INJECTION, SOLUTION SUBCUTANEOUS at 09:59

## 2024-03-30 RX ADMIN — SODIUM CHLORIDE, PRESERVATIVE FREE 10 ML: 5 INJECTION INTRAVENOUS at 08:17

## 2024-03-30 RX ADMIN — TICAGRELOR 90 MG: 90 TABLET ORAL at 13:27

## 2024-03-30 RX ADMIN — MOMETASONE FUROATE AND FORMOTEROL FUMARATE DIHYDRATE 2 PUFF: 200; 5 AEROSOL RESPIRATORY (INHALATION) at 19:41

## 2024-03-30 RX ADMIN — RANOLAZINE 1000 MG: 500 TABLET, FILM COATED, EXTENDED RELEASE ORAL at 21:09

## 2024-03-30 ASSESSMENT — PAIN SCALES - GENERAL
PAINLEVEL_OUTOF10: 0

## 2024-03-30 NOTE — PROGRESS NOTES
Nephrology ([facility]) Progress Note    CC: CKD3b    Subjective:    HPI:  Breathing comfortably.  Still CP.  More awake.Reports poor PO intake.  She passed swallow eval.  ROS:  In bed.  PMFSH:  medications reviewed.    Objective:  Blood pressure (!) 150/76, pulse 77, temperature 98.1 °F (36.7 °C), temperature source Oral, resp. rate 18, height 1.524 m (5'), weight 55.2 kg (121 lb 11.1 oz), SpO2 95 %, not currently breastfeeding.    Intake/Output Summary (Last 24 hours) at 3/30/2024 1507  Last data filed at 3/30/2024 1350  Gross per 24 hour   Intake 1956.95 ml   Output 400 ml   Net 1556.95 ml       General:  NAD, A+Ox3  Chest:  CTAB  CVS:  RRR  Abdominal:  NTND, soft, +BS  Extremities:  no edema  Skin:  no rash    Labs:  Renal panel:  Lab Results   Component Value Date/Time     03/30/2024 04:28 AM    K 4.4 03/30/2024 04:28 AM    K 4.6 03/27/2024 07:18 AM    CO2 21 03/30/2024 04:28 AM    BUN 35 (H) 03/30/2024 04:28 AM    CREATININE 2.2 (H) 03/30/2024 04:28 AM    CALCIUM 9.3 03/30/2024 04:28 AM    PHOS 3.1 03/30/2024 04:28 AM    MG 2.20 03/30/2024 04:28 AM     CBC:  Lab Results   Component Value Date/Time    WBC 10.7 03/30/2024 04:28 AM    HGB 8.0 (L) 03/30/2024 04:28 AM    HCT 25.1 (L) 03/30/2024 04:28 AM     03/30/2024 04:28 AM       Assessment/Plan:  Reviewed old records and labs.    KOLBY on CKD3b: baseline Cr ~ 1.5-1.8 mg/dL. Follows with Dr. Chung in office.             - renal function slightly better              - posily secondary  LYNN   - restarted IVF              - Hold losartan for now              - Avoid hypotension. Avoid nephrotoxic agents when possible              - Daily RFTs     Chest Pain / CAD              - Trop 41>36>38              - LHC showed no significant disease     ?PNA              - CXR (3/26/2024): patchy opacities in the right lung base, favored to represent atelectasis              - On abx              - Pulmonology following     COPD      Hypertension              - Continue current medication regiment    Anemia              - Iron studies adequate     CKD-MBD              - Monitor    AMS   - seizure with post-ictal state  - currently much better    seizure   - neurology consulted  - keppra started

## 2024-03-30 NOTE — PLAN OF CARE
Problem: Discharge Planning  Goal: Discharge to home or other facility with appropriate resources  Outcome: Progressing  Flowsheets (Taken 3/29/2024 1857)  Discharge to home or other facility with appropriate resources:   Identify barriers to discharge with patient and caregiver   Arrange for needed discharge resources and transportation as appropriate   Identify discharge learning needs (meds, wound care, etc)   Refer to discharge planning if patient needs post-hospital services based on physician order or complex needs related to functional status, cognitive ability or social support system     Problem: Pain  Goal: Verbalizes/displays adequate comfort level or baseline comfort level  Outcome: Progressing  Flowsheets (Taken 3/29/2024 1857)  Verbalizes/displays adequate comfort level or baseline comfort level:   Encourage patient to monitor pain and request assistance   Assess pain using appropriate pain scale   Administer analgesics based on type and severity of pain and evaluate response   Implement non-pharmacological measures as appropriate and evaluate response     Problem: Skin/Tissue Integrity  Goal: Absence of new skin breakdown  Description: 1.  Monitor for areas of redness and/or skin breakdown  2.  Assess vascular access sites hourly  3.  Every 4-6 hours minimum:  Change oxygen saturation probe site  4.  Every 4-6 hours:  If on nasal continuous positive airway pressure, respiratory therapy assess nares and determine need for appliance change or resting period.  Outcome: Progressing     Problem: ABCDS Injury Assessment  Goal: Absence of physical injury  Outcome: Progressing     Problem: Safety - Adult  Goal: Free from fall injury  Outcome: Progressing     Problem: Chronic Conditions and Co-morbidities  Goal: Patient's chronic conditions and co-morbidity symptoms are monitored and maintained or improved  Outcome: Progressing  Flowsheets (Taken 3/29/2024 1857)  Care Plan - Patient's Chronic Conditions and  Co-Morbidity Symptoms are Monitored and Maintained or Improved:   Monitor and assess patient's chronic conditions and comorbid symptoms for stability, deterioration, or improvement   Collaborate with multidisciplinary team to address chronic and comorbid conditions and prevent exacerbation or deterioration   Update acute care plan with appropriate goals if chronic or comorbid symptoms are exacerbated and prevent overall improvement and discharge     Problem: Respiratory - Adult  Goal: Achieves optimal ventilation and oxygenation  Outcome: Progressing  Flowsheets (Taken 3/29/2024 1857)  Achieves optimal ventilation and oxygenation:   Assess for changes in respiratory status   Assess for changes in mentation and behavior   Position to facilitate oxygenation and minimize respiratory effort     Problem: Cardiovascular - Adult  Goal: Maintains optimal cardiac output and hemodynamic stability  Outcome: Progressing  Flowsheets (Taken 3/29/2024 1857)  Maintains optimal cardiac output and hemodynamic stability:   Monitor blood pressure and heart rate   Assess for signs of decreased cardiac output   Monitor urine output and notify Licensed Independent Practitioner for values outside of normal range  Goal: Absence of cardiac dysrhythmias or at baseline  Outcome: Progressing  Flowsheets (Taken 3/29/2024 1857)  Absence of cardiac dysrhythmias or at baseline:   Monitor cardiac rate and rhythm   Assess for signs of decreased cardiac output     Problem: Safety - Medical Restraint  Goal: Remains free of injury from restraints (Restraint for Interference with Medical Device)  Description: INTERVENTIONS:  1. Determine that other, less restrictive measures have been tried or would not be effective before applying the restraint  2. Evaluate the patient's condition at the time of restraint application  3. Inform patient/family regarding the reason for restraint  4. Q2H: Monitor safety, psychosocial status, comfort, nutrition and

## 2024-03-30 NOTE — PLAN OF CARE
Problem: Discharge Planning  Goal: Discharge to home or other facility with appropriate resources  3/30/2024 0910 by Nirav Romero RN  Outcome: Progressing  3/30/2024 0044 by Pilar Ge RN  Outcome: Progressing  Flowsheets (Taken 3/29/2024 1857)  Discharge to home or other facility with appropriate resources:   Identify barriers to discharge with patient and caregiver   Arrange for needed discharge resources and transportation as appropriate   Identify discharge learning needs (meds, wound care, etc)   Refer to discharge planning if patient needs post-hospital services based on physician order or complex needs related to functional status, cognitive ability or social support system     Problem: Pain  Goal: Verbalizes/displays adequate comfort level or baseline comfort level  3/30/2024 0910 by Nirav Romero RN  Outcome: Progressing  3/30/2024 0044 by Pilar Ge RN  Outcome: Progressing  Flowsheets (Taken 3/29/2024 1857)  Verbalizes/displays adequate comfort level or baseline comfort level:   Encourage patient to monitor pain and request assistance   Assess pain using appropriate pain scale   Administer analgesics based on type and severity of pain and evaluate response   Implement non-pharmacological measures as appropriate and evaluate response     Problem: Skin/Tissue Integrity  Goal: Absence of new skin breakdown  Description: 1.  Monitor for areas of redness and/or skin breakdown  2.  Assess vascular access sites hourly  3.  Every 4-6 hours minimum:  Change oxygen saturation probe site  4.  Every 4-6 hours:  If on nasal continuous positive airway pressure, respiratory therapy assess nares and determine need for appliance change or resting period.  3/30/2024 0910 by Nirav Romero RN  Outcome: Progressing  3/30/2024 0044 by Pilar Ge RN  Outcome: Progressing     Problem: ABCDS Injury Assessment  Goal: Absence of physical injury  3/30/2024 0910 by Nirav Romero RN  Outcome:  and notify Licensed Independent Practitioner for values outside of normal range  Goal: Absence of cardiac dysrhythmias or at baseline  3/30/2024 0910 by Nirav Romero RN  Outcome: Progressing  3/30/2024 0044 by Pilar Ge RN  Outcome: Progressing  Flowsheets (Taken 3/29/2024 9179)  Absence of cardiac dysrhythmias or at baseline:   Monitor cardiac rate and rhythm   Assess for signs of decreased cardiac output     Problem: Safety - Medical Restraint  Goal: Remains free of injury from restraints (Restraint for Interference with Medical Device)  Description: INTERVENTIONS:  1. Determine that other, less restrictive measures have been tried or would not be effective before applying the restraint  2. Evaluate the patient's condition at the time of restraint application  3. Inform patient/family regarding the reason for restraint  4. Q2H: Monitor safety, psychosocial status, comfort, nutrition and hydration  3/30/2024 0910 by Nirav Romero RN  Outcome: Progressing  3/30/2024 0044 by Pilar Ge RN  Outcome: Progressing

## 2024-03-30 NOTE — PROGRESS NOTES
Vitals:    03/30/24 0015   BP: (!) 136/58   Pulse: 76   Resp: 12   Temp: 98.4 °F (36.9 °C)   SpO2: 95%      Patient is drowsy throughout my shift.  She is easily arousable and stays awake.      Patient states she is just tired.

## 2024-03-30 NOTE — PROGRESS NOTES
Hospitalist Progress Note  3/30/2024 12:06 PM  Subjective:   Admit Date: 3/26/2024  PCP: No primary care provider on file. Status: Inpatient   Interval History: Hospital Day: 5, admitted with chest pain s/p recent MI and hsTnT 41 / 36 / 38 ng/L.  LHC showed no significant disease.  COPD and atypical pneumonia.  Completing five day course of prednisone, ceftriaxone, and azithromycin.  Bronchodilator therapy with mometasone-formoterol.  Not requiring oxygen.  Seizure (3/29) with rapid response.  Witnessed brief seizure event. She had spell that consisted of eye deviation to the right, unresponsive, no urine incontinence, no tongue biting.  Small meningioma on MRI.  Initiated on levetiracetam (3/29).      Noted to be lethargic with unsteady gate, two incontinent episodes of urine, unable to feed herself.  Mild oropharyngeal dysphagia per speech therapy.  Patient declines downgrade to soft and bite sized diet.      Diet: controlled carbohydrate (60 gm/meal)  Right antecubital peripheral IV (3/26, day #5)    Medications:   Sodium chloride at 75 ml/hr  insulin lispro SSI  0-16 Units SubCUTAneous 4x Daily WC   azithromycin  250 mg Oral Daily   docusate sodium  100 mg Oral Daily   levetiracetam  500 mg IntraVENous Q12H   ceftriaxone  1,000 mg IntraVENous Q24H   ranolazine  1,000 mg Oral BID   pantoprazole  40 mg Oral QAM AC   nifedipine  30 mg Oral Daily   montelukast  10 mg Oral Daily   linaclotide  290 mcg Oral QAM AC   insulin glargine  10 Units SubCUTAneous BID   mometasone-formoterol  2 puff Inhalation BID RT   carvedilol  6.25 mg Oral BID WC   bupropion  150 mg Oral BID   atorvastatin  80 mg Oral Nightly   aspirin  81 mg Oral Daily   amitriptyline  40 mg Oral Nightly   prednisone  40 mg Oral Daily     Recent Labs     03/28/24  0437 03/29/24  0530 03/30/24  0428   WBC 8.2 8.0 10.7   HGB 9.0* 8.3* 8.0*    343 356   MCV 93.0 93.7 93.6     Recent Labs     03/28/24  0437 03/29/24  0530 03/30/24  0428    137  arteries reveal normal multiphasic flow.  common femoral, profunda femoral and proximal femoral veins are normal with no evidence of thrombus. There is no evidence of pseudoaneurysm or hematoma noted in the left groin.    Portable CXR (3/26) Patchy opacities in the right lung base, favored to represent atelectasis however airspace disease not excluded.    Objective:   Vitals:  /72   Pulse 75   Temp 97.3 °F (36.3 °C) (Oral)   Resp 17   Ht 1.524 m (5')   Wt 55.2 kg (121 lb 11.1 oz)   SpO2 98% on RA  BMI 23.77 kg/m²   General appearance: somnolent, falls asleep in middle of conversation  Lungs: clear to auscultation bilaterally  Heart: regular rate and rhythm, S1, S2 normal, no murmur, click, rub or gallop  Abdomen: soft, non-tender; bowel sounds normal; no masses,  no organomegaly  Extremities: extremities normal, atraumatic, no cyanosis or edema  Neurologic: Residual right sided weakness from prior CVA.  No obvious focal neurologic deficits.     Assessment and Plan:   Atypical pneumonia.  Completing five day course of prednisone, ceftriaxone, and azithromycin.  Bronchodilator therapy with mometasone-formoterol.  Not requiring oxygen.    New onset seizure (3/29) with known small meningioma.  Initiated on levetiracetam (3/29).  Followed by neurology.  MRI brain (June 2022) mentioned a 7mm meningioma in left superior frontal gyrus.  Seizure precautions.  EEG pending per neurology.  No driving for three months but patient does not drive.    Chest pain s/p recent MI and hsTnT 41 / 36 / 38 ng/L.  LHC showed no significant disease.  Recommendation to restart ticagrelor and continue dual anti-platelet therapy per cardiology.    History of CVA with residual right-sided weakness on aspirin and atorvastatin.   Type 2 Diabetes (uncontrolled, HgbA1c 8.8%).  Basal glargine insulin 10 units BID and cover with a \"sliding scale\" lispro moderate scale prandial correction insulin.    Acute kidney injury on chronic kidney

## 2024-03-30 NOTE — PROGRESS NOTES
Pt noted lethargic, unsteady gait, had two incontinent episodes of urine, unable to feed herself, and would sleep in middle of conversions. Pt also noted with mild tremors to hands. Pt feed this am and was contact guard X2 with ambulation. Called [facility] center and left message for Neurologist Dr. Esquivel. Awaiting on response. Notified Hospitalitis and daughter Ester of pt's status. Pt currently in bed resting. Call light within reach. All safety measures in place. No distress or discomfort noted.

## 2024-03-30 NOTE — PROGRESS NOTES
Progress Note  The patient is being evaluated for seizure, r/o stroke     Updates  Asked by RN to evaluate as patient was noted to be lethargic, altered mentation. Not able to feed self. No events of unresponsivenss or clear seizure like activity.     I went to bedside in timely manner during face to face encouner and patient was mentating  well. Tired. Speaking, following commands.     Awaiting MRI Brain w/.     Continues on keppra.     Per RN has been incontinent of urine. Patient states its because she cannot get staff to bedside and to bathroom in time.     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    Chronic diastolic CHF (congestive heart failure), NYHA class 2 ([facility]) 04/28/2015    COPD (chronic obstructive pulmonary disease) ([facility]) 04/29/2015    NSTEMI (non-ST elevated myocardial infarction) ([facility]) 05/07/2015    Essential hypertension     Fibromyalgia 06/07/2016    Type 2 diabetes mellitus with diabetic chronic kidney disease ([facility]) 11/28/2016    S/P right coronary artery (RCA) stent placement 11/28/2016    Irritable bowel syndrome 05/05/2017    Gastro-esophageal reflux disease without esophagitis 05/05/2017    Chronic painful diabetic neuropathy ([facility]) 01/05/2018    Age-related nuclear cataract, bilateral 01/24/2020    Chronic prescription opiate use 06/29/2018    Coronary stent restenosis due to progression of disease 10/03/2019    Hypertensive heart and chronic kidney disease with heart failure and stage 1 through stage 4 chronic kidney disease, or unspecified chronic kidney disease (HCC) 05/05/2017    Ischemic cardiomyopathy 10/03/2019    Moderate episode of recurrent major depressive disorder (HCC) 05/05/2017    Regular astigmatism, bilateral 01/24/2020    CKD (chronic kidney     No acute intracranial abnormality.  No acute infarct.   Mild global parenchymal volume loss with minimal chronic microvascular ischemic changes.   Chronic lacunar infarct within the right shahnaz.  Minimal cerebellar tonsillar ectopia.    MRA Head & Neck w/o 3/29/24:   No significant stenosis or large vessel occlusion of the [id number].   No convincing flow limiting stenosis of the visualized cervical  carotid/vertebral arteries.  Suggestion of less than 50% stenosis of the proximal right cervical ICA by NASCET criteria.    Impression  1. New onset seizure: Reported witnessed GTC for 15 seconds. Also reported to have eye deviation to the right, unresponsive. No urine incontinence, no tongue biting. She does not recall event.     MRI brain without acute intracranial process. MRI Brain w/ pending. Awaiting R. EEG.     Keppra was initiated by Neurology attending, likely do to hx of meningioma which can serve as nidus.     No further known seizure activity. RN did report alerted mental status today as below.         2. Altered mental status: Reported by nursing today. Lethargic, unable to feed self. Mentation much improved at time of encounter.     Will monitor incase possible effects from Keppra.     3. Meningioma   4. Hx of R pontine infarct: No acute infarct on MRI this admission. On DAPT/STATIN   5. Atypical chest pain: Reason for admission. On DAPT/STATIN   6. CKD  7. Paroxysmal Atrial fibrillation:. Not currently on anticoagulation         Recommendations  - Awaiting MRI Brain w/   - Awaiting Routine EEG   - Patient is on Wellbutrin. May need to consider alternative as is known to lower seizure threshold.   - Continue to monitor Neuro status, any seizure like activity or significant acute fluctuations.   - Ativan 1mg prn for seizure activity--IVP Q 5min   - Telemetry monitoring.   - PT, OT, SLP.   - Will need to reiterate OP seizure precautions prior to discharge.     Trinh Beal, LINDA  [facility]

## 2024-03-30 NOTE — PROGRESS NOTES
Pulmonary Progress Note    Date of Admission: 3/26/2024   LOS: 3 days       CC:  Chief Complaint   Patient presents with    Chest Pain     Patient presents to ED complaining of chest pain which started 2 hours ago. Patient reports having a hearty attack 2 weeks ago and states this feels very similarly. Reports some shortness of breath.        Subjective:  Feeling better.    Close to baseline     ROS:   No nausea  No Vomiting  No chest pain       Assessment:     Copd  Atypical pneumonia    Plan:     This note may have been transcribed using Dragon Dictation software. Please disregard any translational errors.       Hospital Day: 3        Close to baseline  Prednisone x 5 days.   Azithromycin x 5  Duoneb's    Dulera   On room air   Close to d/c     Will sign off at this time.  Thanks for the consult.   Please call with questions.           Data:        PHYSICAL EXAM:   Blood pressure (!) 155/72, pulse 75, temperature 97.3 °F (36.3 °C), temperature source Oral, resp. rate 17, height 1.524 m (5'), weight 55.2 kg (121 lb 11.1 oz), SpO2 98 %, not currently breastfeeding.'  Body mass index is 23.77 kg/m².   Gen: No distress.    ENT:   Resp: No accessory muscle use. No crackles. No wheezes. No rhonchi.    CV: Regular rate. Regular rhythm. No murmur or rub. No edema.   Skin: Warm, dry, normal texture and turgor. No nodule on exposed extremities.   M/S: No cyanosis. No clubbing. No joint deformity.  Psych: Oriented x 3. No anxiety.  Awake. Alert. Intact judgement and insight. Good Mood / Affect.  Memory appears in tact       Medications:    Scheduled Meds:   insulin lispro  0-16 Units SubCUTAneous 4x Daily WC    sodium chloride flush  5-40 mL IntraVENous 2 times per day    azithromycin  250 mg Oral Daily    docusate sodium  100 mg Oral Daily    levETIRAcetam  500 mg IntraVENous Q12H    cefTRIAXone (ROCEPHIN) IV  1,000 mg IntraVENous Q24H    ranolazine  1,000 mg Oral BID    pantoprazole  40 mg Oral QAM AC    NIFEdipine  30 mg  bilateral pulmonary fibrosis.  3. Chronic SVC occlusion with associated venous collateralization.      CXR PA/LAT: Results for orders placed during the hospital encounter of 04/07/23    XR CHEST (2 VW)    Narrative  EXAMINATION:  TWO XRAY VIEWS OF THE CHEST    4/7/2023 5:35 pm    COMPARISON:  02/17/2023    HISTORY:  ORDERING SYSTEM PROVIDED HISTORY: chest pain  TECHNOLOGIST PROVIDED HISTORY:  Reason for exam:->chest pain  Reason for Exam: chest pain    FINDINGS:  Chronic interstitial opacities seen throughout both lungs.  No acute  infiltrate identified.  Cardial pericardial silhouette is stable, with  redemonstration of right paratracheal stripe prominence.  No pneumothorax.  No free air.  No acute bony abnormality.    Impression  No acute abnormality identified.      CXR portable: Results for orders placed during the hospital encounter of 03/26/24    XR CHEST PORTABLE    Narrative  EXAMINATION:  ONE XRAY VIEW OF THE CHEST    3/26/2024 12:46 pm    COMPARISON:  CXR dated 04/29/2023    HISTORY:  ORDERING SYSTEM PROVIDED HISTORY: chest pain  TECHNOLOGIST PROVIDED HISTORY:  Reason for exam:->chest pain  Reason for Exam: chest pain    FINDINGS:  Medical devices: Stents over the heart.  Watchman device.    Mediastinum/Heart: The mediastinal contours are normal. The heart appears  normal in size.    Lungs: Patchy opacities in the right lung base, favored to represent  atelectasis however airspace disease not excluded.    Pleura: No pleural effusion. No pneumothorax.    Impression  Patchy opacities in the right lung base, favored to represent atelectasis  however airspace disease not excluded.             This note was transcribed using Dragon Dictation software. Please disregard any translational errors.      MYRIAM RAMIREZ   [facility]monary, Sleep and Critical Care  [phone]

## 2024-03-30 NOTE — PROGRESS NOTES
Facility/Department: [address] PROGRESSIVE CARE  Initial Assessment  DYSPHAGIA BEDSIDE SWALLOW EVALUATION     Patient: Maren Meza   : 1955   MRN: [id number]      Evaluation Date: 3/30/2024   Admitting Diagnosis: Chest pain [R07.9]  Elevated troponin [R79.89]  Acute kidney injury superimposed on chronic kidney disease (HCC) [N17.9, N18.9]  Chest pain, unspecified type [R07.9]  Pain: Did not state                                                       Chart Review:   H&P: 68 y.o. female who presented to [facility] with past medical Struve hypertension, hyperlipidemia, fibromyalgia, CAD With A-fibPresented to ED with chief complaint of chest pain shortness of breath     Patient reported that she woke up this morning and she was sitting on the bed and suddenly started having some chest pain that is anterior left side rating to the left arm and left shoulder and back.  Patient also reports having shortness of breath intermittently.  The chest pain is constant but would exacerbate and worsen intermittently lasting less than 30 minutes.  Patient does admit having heart attacks before and has been compliant with her Brilinta and aspirin.  Patient reports that she does have family members who smoke inside the house.  Patient also reports that she was recently admitted at [facility] 2 weeks ago for chest pain.  Patient otherwise denies having any abdominal pain or dysuria does report that her brother at the house was also sick with cough and phlegm and flulike symptoms.    Significant event 3/29/24:   Rapid response called, reported to bedside apparently patient was lethargic encephalopathic drooling not following commands, code stroke called  Vitals within normal limits  Lungs clear  S1-S2 regular  Abdomen soft  Initially not following commands with time started to follow more commands  Some generalized weakness then when more awake alert was able to follow commands  CT head stat ordered  Called and discussed  assessment needs    Consistencies Presented:   Thin liquid;   Mildly / nectar thick liquid ;   Moderately / honey thick liquid  Puree food  Minced and moist food   Soft/bite size food  Regular solid food    Cognitive/behavioral:   []orientation [x] to self [x] Kind of place [x] date [] reason for admit [] purpose of evaluation  [x]distractible  [x]verbally responsive  [x]follows one step commands  []agitated  []impulsive  [x] other: somewhat confused, difficulty recalling recent medical events      Patient Positioning: Upright in bed     Dentition:  []Adequate  []Dentures   []Missing Many Teeth  [x]Edentulous  []Other:    Baseline Vocal Quality:  [x]Normal  []Dysphonic   []Aphonic   []Hoarse  []Wet  []Weak  []Other:    Volitional Cough:  [x]Strong  []Weak  []Wet  []Absent  []Congested  []Other:    Volitional Swallow:   []Absent   [x]Delayed     []Adequate     []Required use of drink     Oral Mechanism Exam:  []WFL [x]Mild   [] Moderate  []Severe  []To be assessed  Impaired:   []Left side      []Right side    []Labial ROM/Coordination    []Labial Symmetry   [x]Lingual ROM/Coordination   []Lingual Symmetry  []Gag  []Other:     Oral Phase: []WFL [x]Mild   [] Moderate  []Severe  []To be assessed   [x]Impaired/Prolonged Mastication:   []Spillage Left:   []Spillage Right:  []Pocketing Left:   []Pocketing Right:   []Decreased Anterior to Posterior Transit:   []Suspected Premature Bolus Loss:   [x]Lingual/Palatal Residue:   []Other:     Pharyngeal Phase: []WFL [x]Mild   [] Moderate  []Severe  []To be assessed   [x]Delayed Swallow:   []Suspected Pharyngeal Pooling:   [x]Decreased Laryngeal Elevation:   []Absent Swallow:  []Wet Vocal Quality:   []Throat Clearing-Immediate:   []Throat Clearing-Delayed:   []Cough-Immediate:   []Cough-Delayed:  []Change in Vital Signs:  []Suspected Delayed Pharyngeal Clearing:  []Other:       Eating Assistance:  []Independent  []Setup or clean-up assistance   [x] Supervision or touching

## 2024-03-31 LAB
ALBUMIN SERPL-MCNC: 3.2 G/DL (ref 3.4–5)
ANION GAP SERPL CALCULATED.3IONS-SCNC: 13 MMOL/L (ref 3–16)
BASOPHILS # BLD: 0 K/UL (ref 0–0.2)
BASOPHILS NFR BLD: 0.2 %
BUN SERPL-MCNC: 23 MG/DL (ref 7–20)
CALCIUM SERPL-MCNC: 8.9 MG/DL (ref 8.3–10.6)
CHLORIDE SERPL-SCNC: 101 MMOL/L (ref 99–110)
CO2 SERPL-SCNC: 22 MMOL/L (ref 21–32)
CREAT SERPL-MCNC: 1.5 MG/DL (ref 0.6–1.2)
DEPRECATED RDW RBC AUTO: 16 % (ref 12.4–15.4)
EOSINOPHIL # BLD: 0 K/UL (ref 0–0.6)
EOSINOPHIL NFR BLD: 0.2 %
GFR SERPLBLD CREATININE-BSD FMLA CKD-EPI: 38 ML/MIN/{1.73_M2}
GLUCOSE BLD-MCNC: 108 MG/DL (ref 70–99)
GLUCOSE BLD-MCNC: 116 MG/DL (ref 70–99)
GLUCOSE BLD-MCNC: 293 MG/DL (ref 70–99)
GLUCOSE BLD-MCNC: 59 MG/DL (ref 70–99)
GLUCOSE BLD-MCNC: 92 MG/DL (ref 70–99)
GLUCOSE SERPL-MCNC: 100 MG/DL (ref 70–99)
HCT VFR BLD AUTO: 24.7 % (ref 36–48)
HGB BLD-MCNC: 8.3 G/DL (ref 12–16)
LYMPHOCYTES # BLD: 1 K/UL (ref 1–5.1)
LYMPHOCYTES NFR BLD: 11 %
MCH RBC QN AUTO: 30.7 PG (ref 26–34)
MCHC RBC AUTO-ENTMCNC: 33.7 G/DL (ref 31–36)
MCV RBC AUTO: 91.3 FL (ref 80–100)
MONOCYTES # BLD: 0.8 K/UL (ref 0–1.3)
MONOCYTES NFR BLD: 8.8 %
NEUTROPHILS # BLD: 7.6 K/UL (ref 1.7–7.7)
NEUTROPHILS NFR BLD: 79.8 %
PERFORMED ON: ABNORMAL
PERFORMED ON: NORMAL
PHOSPHATE SERPL-MCNC: 3 MG/DL (ref 2.5–4.9)
PLATELET # BLD AUTO: 321 K/UL (ref 135–450)
PMV BLD AUTO: 6.8 FL (ref 5–10.5)
POTASSIUM SERPL-SCNC: 3.4 MMOL/L (ref 3.5–5.1)
RBC # BLD AUTO: 2.71 M/UL (ref 4–5.2)
SODIUM SERPL-SCNC: 136 MMOL/L (ref 136–145)
WBC # BLD AUTO: 9.5 K/UL (ref 4–11)

## 2024-03-31 PROCEDURE — 2580000003 HC RX 258: Performed by: INTERNAL MEDICINE

## 2024-03-31 PROCEDURE — 6360000002 HC RX W HCPCS: Performed by: INTERNAL MEDICINE

## 2024-03-31 PROCEDURE — 6370000000 HC RX 637 (ALT 250 FOR IP): Performed by: INTERNAL MEDICINE

## 2024-03-31 PROCEDURE — 2060000000 HC ICU INTERMEDIATE R&B

## 2024-03-31 PROCEDURE — 80069 RENAL FUNCTION PANEL: CPT

## 2024-03-31 PROCEDURE — 85025 COMPLETE CBC W/AUTO DIFF WBC: CPT

## 2024-03-31 PROCEDURE — 94760 N-INVAS EAR/PLS OXIMETRY 1: CPT

## 2024-03-31 PROCEDURE — 6360000002 HC RX W HCPCS: Performed by: NURSE PRACTITIONER

## 2024-03-31 RX ORDER — CARVEDILOL 12.5 MG/1
12.5 TABLET ORAL 2 TIMES DAILY WITH MEALS
Status: DISCONTINUED | OUTPATIENT
Start: 2024-03-31 | End: 2024-04-03

## 2024-03-31 RX ORDER — POTASSIUM CHLORIDE 750 MG/1
10 TABLET, FILM COATED, EXTENDED RELEASE ORAL DAILY
Status: DISCONTINUED | OUTPATIENT
Start: 2024-03-31 | End: 2024-04-05 | Stop reason: HOSPADM

## 2024-03-31 RX ORDER — INSULIN GLARGINE 100 [IU]/ML
8 INJECTION, SOLUTION SUBCUTANEOUS 2 TIMES DAILY
Status: DISCONTINUED | OUTPATIENT
Start: 2024-03-31 | End: 2024-04-03

## 2024-03-31 RX ADMIN — PANTOPRAZOLE SODIUM 40 MG: 40 TABLET, DELAYED RELEASE ORAL at 05:18

## 2024-03-31 RX ADMIN — LABETALOL HYDROCHLORIDE 10 MG: 5 INJECTION, SOLUTION INTRAVENOUS at 16:04

## 2024-03-31 RX ADMIN — TICAGRELOR 90 MG: 90 TABLET ORAL at 08:48

## 2024-03-31 RX ADMIN — LABETALOL HYDROCHLORIDE 10 MG: 5 INJECTION, SOLUTION INTRAVENOUS at 20:18

## 2024-03-31 RX ADMIN — LEVETIRACETAM 500 MG: 5 INJECTION, SOLUTION INTRAVENOUS at 12:06

## 2024-03-31 RX ADMIN — AZITHROMYCIN 250 MG: 250 TABLET, FILM COATED ORAL at 08:48

## 2024-03-31 RX ADMIN — RANOLAZINE 1000 MG: 500 TABLET, FILM COATED, EXTENDED RELEASE ORAL at 08:48

## 2024-03-31 RX ADMIN — WATER 1000 MG: 1 INJECTION INTRAMUSCULAR; INTRAVENOUS; SUBCUTANEOUS at 09:33

## 2024-03-31 RX ADMIN — DOCUSATE SODIUM 100 MG: 100 CAPSULE, LIQUID FILLED ORAL at 08:48

## 2024-03-31 RX ADMIN — BUPROPION HYDROCHLORIDE 150 MG: 150 TABLET, EXTENDED RELEASE ORAL at 08:48

## 2024-03-31 RX ADMIN — CARVEDILOL 6.25 MG: 6.25 TABLET, FILM COATED ORAL at 08:48

## 2024-03-31 RX ADMIN — CARVEDILOL 12.5 MG: 12.5 TABLET, FILM COATED ORAL at 18:29

## 2024-03-31 RX ADMIN — BUPROPION HYDROCHLORIDE 150 MG: 150 TABLET, EXTENDED RELEASE ORAL at 20:18

## 2024-03-31 RX ADMIN — AMITRIPTYLINE HYDROCHLORIDE 40 MG: 10 TABLET, FILM COATED ORAL at 20:18

## 2024-03-31 RX ADMIN — TICAGRELOR 90 MG: 90 TABLET ORAL at 20:17

## 2024-03-31 RX ADMIN — SODIUM CHLORIDE, PRESERVATIVE FREE 10 ML: 5 INJECTION INTRAVENOUS at 20:18

## 2024-03-31 RX ADMIN — MONTELUKAST 10 MG: 10 TABLET, FILM COATED ORAL at 08:48

## 2024-03-31 RX ADMIN — ACETAMINOPHEN 325MG 650 MG: 325 TABLET ORAL at 12:46

## 2024-03-31 RX ADMIN — RANOLAZINE 1000 MG: 500 TABLET, FILM COATED, EXTENDED RELEASE ORAL at 20:17

## 2024-03-31 RX ADMIN — ASPIRIN 81 MG 81 MG: 81 TABLET ORAL at 08:48

## 2024-03-31 RX ADMIN — POTASSIUM CHLORIDE 10 MEQ: 750 TABLET, EXTENDED RELEASE ORAL at 12:06

## 2024-03-31 RX ADMIN — ATORVASTATIN CALCIUM 80 MG: 80 TABLET, FILM COATED ORAL at 20:18

## 2024-03-31 RX ADMIN — QUETIAPINE FUMARATE 50 MG: 25 TABLET ORAL at 21:53

## 2024-03-31 RX ADMIN — LABETALOL HYDROCHLORIDE 10 MG: 5 INJECTION, SOLUTION INTRAVENOUS at 11:27

## 2024-03-31 RX ADMIN — NIFEDIPINE 30 MG: 30 TABLET, EXTENDED RELEASE ORAL at 08:48

## 2024-03-31 RX ADMIN — INSULIN GLARGINE 8 UNITS: 100 INJECTION, SOLUTION SUBCUTANEOUS at 20:24

## 2024-03-31 RX ADMIN — PREDNISONE 40 MG: 20 TABLET ORAL at 08:48

## 2024-03-31 RX ADMIN — ONDANSETRON 4 MG: 2 INJECTION INTRAMUSCULAR; INTRAVENOUS at 09:31

## 2024-03-31 ASSESSMENT — PAIN SCALES - GENERAL
PAINLEVEL_OUTOF10: 1
PAINLEVEL_OUTOF10: 3
PAINLEVEL_OUTOF10: 0

## 2024-03-31 ASSESSMENT — PAIN DESCRIPTION - LOCATION: LOCATION: HEAD

## 2024-03-31 ASSESSMENT — PAIN DESCRIPTION - ORIENTATION: ORIENTATION: MID

## 2024-03-31 ASSESSMENT — PAIN - FUNCTIONAL ASSESSMENT: PAIN_FUNCTIONAL_ASSESSMENT: ACTIVITIES ARE NOT PREVENTED

## 2024-03-31 ASSESSMENT — PAIN DESCRIPTION - DESCRIPTORS: DESCRIPTORS: ACHING

## 2024-03-31 NOTE — PROGRESS NOTES
Nephrology ([facility]) Progress Note    CC: CKD3b    Subjective:    HPI:  Breathing comfortably.  No CP.  More awake. Renal function back to baseline. Hypoglycemic in the AM.  Eating poorly due to anorexia.  ROS:  In bed.  BP running high.  PMFSH:  medications reviewed.    Objective:  Blood pressure (!) 181/75, pulse 80, temperature 98.7 °F (37.1 °C), temperature source Oral, resp. rate 16, height 1.524 m (5'), weight 55 kg (121 lb 4.1 oz), SpO2 99 %, not currently breastfeeding.    Intake/Output Summary (Last 24 hours) at 3/31/2024 1610  Last data filed at 3/31/2024 1427  Gross per 24 hour   Intake 1503.19 ml   Output 3150 ml   Net -1646.81 ml       General:  NAD, A+Ox3  Chest:  CTAB  CVS:  RRR  Abdominal:  NTND, soft, +BS  Extremities:  no edema  Skin:  no rash    Labs:  Renal panel:  Lab Results   Component Value Date/Time     03/31/2024 09:45 AM    K 3.4 (L) 03/31/2024 09:45 AM    K 4.6 03/27/2024 07:18 AM    CO2 22 03/31/2024 09:45 AM    BUN 23 (H) 03/31/2024 09:45 AM    CREATININE 1.5 (H) 03/31/2024 09:45 AM    CALCIUM 8.9 03/31/2024 09:45 AM    PHOS 3.0 03/31/2024 09:45 AM    MG 2.20 03/30/2024 04:28 AM     CBC:  Lab Results   Component Value Date/Time    WBC 9.5 03/31/2024 09:45 AM    HGB 8.3 (L) 03/31/2024 09:45 AM    HCT 24.7 (L) 03/31/2024 09:45 AM     03/31/2024 09:45 AM       Assessment/Plan:  Reviewed old records and labs.    KOLBY on CKD3b: baseline Cr ~ 1.5-1.8 mg/dL. Follows with Dr. Chung in office.               - initially secondary LYNN then from volume depletion due to poor PO intake            - renal function slightly better   - d/c IVF again and monitor renal function              - Hold losartan for now              - Avoid hypotension. Avoid nephrotoxic agents when possible              - Daily RFTs     Chest Pain / CAD              - Trop 41>36>38              - LHC showed no significant disease     ?PNA              - CXR (3/26/2024): patchy opacities in  the right lung base, favored to represent atelectasis              - On abx              - Pulmonology following     COPD     Hypertension              - could not find documentation as to why outpt metoprolol was switched to coreg, but I increased it to 12.5 mg bid   - also, the outpt amlodipine was switched to procardia XL without clear documentation, but I d/w nursing that procardia XL is the better antihypertensive agent, so if BP continues to run high I would increase it to 60 mg    Anemia              - Iron studies adequate     CKD-MBD              - Monitor    AMS   - seizure with post-ictal state  - currently much better    seizure   - neurology consulted  - keppra started    DM2   - blood sugars running low in the AM's   - will decrease lantus 20% to 8 units bid  - I encouraged the patient to eat more

## 2024-03-31 NOTE — PLAN OF CARE
Problem: Discharge Planning  Goal: Discharge to home or other facility with appropriate resources  3/31/2024 0908 by Abbi Recio, RN  Outcome: Progressing     Problem: Skin/Tissue Integrity  Goal: Absence of new skin breakdown  Description: 1.  Monitor for areas of redness and/or skin breakdown  2.  Assess vascular access sites hourly  3.  Every 4-6 hours minimum:  Change oxygen saturation probe site  4.  Every 4-6 hours:  If on nasal continuous positive airway pressure, respiratory therapy assess nares and determine need for appliance change or resting period.  3/31/2024 0908 by Abbi Recio, RN  Outcome: Progressing     Problem: Respiratory - Adult  Goal: Achieves optimal ventilation and oxygenation  3/31/2024 0908 by Abbi Recio, RN  Outcome: Progressing     Problem: Safety - Medical Restraint  Goal: Remains free of injury from restraints (Restraint for Interference with Medical Device)  Description: INTERVENTIONS:  1. Determine that other, less restrictive measures have been tried or would not be effective before applying the restraint  2. Evaluate the patient's condition at the time of restraint application  3. Inform patient/family regarding the reason for restraint  4. Q2H: Monitor safety, psychosocial status, comfort, nutrition and hydration  3/31/2024 0908 by Abbi Recio, RN  Outcome: Progressing

## 2024-03-31 NOTE — PROGRESS NOTES
Hospitalist Progress Note  3/31/2024 11:40 AM  Subjective:   Admit Date: 3/26/2024  PCP: No primary care provider on file. Status: Inpatient   Interval History: Hospital Day: 6, no further seizure. Wants to go home.     History of present illness:  Admitted with chest pain s/p recent MI and hsTnT 41 / 36 / 38 ng/L.  LHC showed no significant disease.  COPD and atypical pneumonia.  Completing five day course of prednisone, ceftriaxone, and azithromycin.  Bronchodilator therapy with mometasone-formoterol.  Not requiring oxygen.  Seizure (3/29) with rapid response.  Witnessed brief seizure event. She had spell that consisted of eye deviation to the right, unresponsive, no urine incontinence, no tongue biting.  Small meningioma on MRI.  Initiated on levetiracetam (3/29).       Noted to be lethargic with unsteady gate, two incontinent episodes of urine, unable to feed herself.  Mild oropharyngeal dysphagia per speech therapy.  Patient declines downgrade to soft and bite sized diet.       Diet: controlled carbohydrate (60 gm/meal)  Right antecubital peripheral IV (3/26, day #6)    Medications:     ticagrelor  90 mg Oral BID   insulin lispro SSI  0-16 Units SubCUTAneous 4x Daily WC   azithromycin  250 mg Oral Daily   docusate sodium  100 mg Oral Daily   levetiracetam  500 mg IntraVENous Q12H (3/29, day #3)   ranolazine  1,000 mg Oral BID   pantoprazole  40 mg Oral QAM AC   nifedipine  30 mg Oral Daily   montelukast  10 mg Oral Daily   linaclotide  290 mcg Oral QAM AC   insulin glargine  10 Units SubCUTAneous BID   mometasone-formoterol  2 puff Inhalation BID RT   carvedilol  6.25 mg Oral BID WC   bupropion  150 mg Oral BID   atorvastatin  80 mg Oral Nightly   aspirin  81 mg Oral Daily   amitriptyline  40 mg Oral Nightly     Recent Labs     03/29/24  0530 03/30/24  0428 03/31/24  0945   WBC 8.0 10.7 9.5   HGB 8.3* 8.0* 8.3*    356 321   MCV 93.7 93.6 91.3     Recent Labs     03/29/24  0530 03/30/24  0428  Dr. Silvina Rodriguez (Pediatrician) vessel 20% ISR. The left circumflex artery is non-dominant with small AV groove course with minor luminal irregularities. OM1 has moderate ISR 40% mid vessel.  OM2 has mild ISR 30% proximal and mid vessel.  The right coronary artery is dominant vessel with widely patent stents.  RPDA has mild diffuse disease.  Selective angiography of the left common femoral artery was performed without evidence of extravasation.      Objective:   Vitals:  /82   Pulse 82   Temp 98.5 °F (oral)   Resp 16   Ht 1.524 m (5')   Wt 55 kg (121 lb 4.1 oz)   SpO2 99% on RA  BMI 23.68 kg/m²   General appearance: somnolent, falls asleep in middle of conversation  Lungs: clear to auscultation bilaterally  Heart: regular rate and rhythm, S1, S2 normal, no murmur, click, rub or gallop  Abdomen: soft, non-tender; bowel sounds normal; no masses,  no organomegaly  Extremities: extremities normal, atraumatic, no cyanosis or edema  Neurologic: Residual right sided weakness from prior CVA.  No obvious focal neurologic deficits.     Assessment and Plan:   Atypical pneumonia.  Completing five day course of prednisone, ceftriaxone, and azithromycin.  Bronchodilator therapy with mometasone-formoterol.  Not requiring oxygen.    New onset seizure (3/29) with known small meningioma.  Initiated on levetiracetam (3/29).  Followed by neurology.  MRI brain (June 2022) mentioned a 7mm meningioma in left superior frontal gyrus.  Seizure precautions.  EEG pending per neurology.  No driving for three months but patient does not drive.    Chest pain s/p recent MI and hsTnT 41 / 36 / 38 ng/L.  [facility] showed no significant disease.  Recommendation to restart ticagrelor and continue dual anti-platelet therapy per cardiology.    History of CVA with residual right-sided weakness on aspirin and atorvastatin.   Type 2 Diabetes (uncontrolled, HgbA1c 8.8%).  Basal glargine insulin 10 units BID and cover with a \"sliding scale\" lispro moderate scale prandial correction  Dr. Silvina Esquivel (Pediatrician)

## 2024-03-31 NOTE — PLAN OF CARE
Problem: Discharge Planning  Goal: Discharge to home or other facility with appropriate resources  Outcome: Progressing     Problem: Pain  Goal: Verbalizes/displays adequate comfort level or baseline comfort level  Outcome: Progressing     Problem: Skin/Tissue Integrity  Goal: Absence of new skin breakdown  Description: 1.  Monitor for areas of redness and/or skin breakdown  2.  Assess vascular access sites hourly  3.  Every 4-6 hours minimum:  Change oxygen saturation probe site  4.  Every 4-6 hours:  If on nasal continuous positive airway pressure, respiratory therapy assess nares and determine need for appliance change or resting period.  Outcome: Progressing     Problem: ABCDS Injury Assessment  Goal: Absence of physical injury  Outcome: Progressing     Problem: Safety - Adult  Goal: Free from fall injury  Outcome: Progressing     Problem: Chronic Conditions and Co-morbidities  Goal: Patient's chronic conditions and co-morbidity symptoms are monitored and maintained or improved  Outcome: Progressing     Problem: Respiratory - Adult  Goal: Achieves optimal ventilation and oxygenation  Outcome: Progressing     Problem: Cardiovascular - Adult  Goal: Maintains optimal cardiac output and hemodynamic stability  Outcome: Progressing  Goal: Absence of cardiac dysrhythmias or at baseline  Outcome: Progressing     Problem: Safety - Medical Restraint  Goal: Remains free of injury from restraints (Restraint for Interference with Medical Device)  Description: INTERVENTIONS:  1. Determine that other, less restrictive measures have been tried or would not be effective before applying the restraint  2. Evaluate the patient's condition at the time of restraint application  3. Inform patient/family regarding the reason for restraint  4. Q2H: Monitor safety, psychosocial status, comfort, nutrition and hydration  Outcome: Progressing

## 2024-04-01 ENCOUNTER — APPOINTMENT (OUTPATIENT)
Dept: MRI IMAGING | Age: 69
DRG: 193 | End: 2024-04-01
Payer: COMMERCIAL

## 2024-04-01 LAB
ALBUMIN SERPL-MCNC: 3.3 G/DL (ref 3.4–5)
ANION GAP SERPL CALCULATED.3IONS-SCNC: 15 MMOL/L (ref 3–16)
BASOPHILS # BLD: 0 K/UL (ref 0–0.2)
BASOPHILS NFR BLD: 0.4 %
BUN SERPL-MCNC: 22 MG/DL (ref 7–20)
CALCIUM SERPL-MCNC: 9.3 MG/DL (ref 8.3–10.6)
CHLORIDE SERPL-SCNC: 103 MMOL/L (ref 99–110)
CO2 SERPL-SCNC: 21 MMOL/L (ref 21–32)
CREAT SERPL-MCNC: 1.4 MG/DL (ref 0.6–1.2)
DEPRECATED RDW RBC AUTO: 15.9 % (ref 12.4–15.4)
EOSINOPHIL # BLD: 0 K/UL (ref 0–0.6)
EOSINOPHIL NFR BLD: 0.3 %
GFR SERPLBLD CREATININE-BSD FMLA CKD-EPI: 41 ML/MIN/{1.73_M2}
GLUCOSE BLD-MCNC: 117 MG/DL (ref 70–99)
GLUCOSE BLD-MCNC: 79 MG/DL (ref 70–99)
GLUCOSE BLD-MCNC: 86 MG/DL (ref 70–99)
GLUCOSE BLD-MCNC: 96 MG/DL (ref 70–99)
GLUCOSE SERPL-MCNC: 80 MG/DL (ref 70–99)
HCT VFR BLD AUTO: 27.9 % (ref 36–48)
HGB BLD-MCNC: 9.3 G/DL (ref 12–16)
LYMPHOCYTES # BLD: 1.2 K/UL (ref 1–5.1)
LYMPHOCYTES NFR BLD: 11.9 %
MCH RBC QN AUTO: 30.3 PG (ref 26–34)
MCHC RBC AUTO-ENTMCNC: 33.2 G/DL (ref 31–36)
MCV RBC AUTO: 91.1 FL (ref 80–100)
MONOCYTES # BLD: 0.9 K/UL (ref 0–1.3)
MONOCYTES NFR BLD: 9.3 %
NEUTROPHILS # BLD: 7.8 K/UL (ref 1.7–7.7)
NEUTROPHILS NFR BLD: 78.1 %
PERFORMED ON: ABNORMAL
PERFORMED ON: NORMAL
PHOSPHATE SERPL-MCNC: 3.1 MG/DL (ref 2.5–4.9)
PLATELET # BLD AUTO: 338 K/UL (ref 135–450)
PMV BLD AUTO: 7.2 FL (ref 5–10.5)
POTASSIUM SERPL-SCNC: 3.7 MMOL/L (ref 3.5–5.1)
RBC # BLD AUTO: 3.06 M/UL (ref 4–5.2)
SODIUM SERPL-SCNC: 139 MMOL/L (ref 136–145)
WBC # BLD AUTO: 10 K/UL (ref 4–11)

## 2024-04-01 PROCEDURE — 6360000002 HC RX W HCPCS: Performed by: INTERNAL MEDICINE

## 2024-04-01 PROCEDURE — 6370000000 HC RX 637 (ALT 250 FOR IP): Performed by: INTERNAL MEDICINE

## 2024-04-01 PROCEDURE — 92526 ORAL FUNCTION THERAPY: CPT

## 2024-04-01 PROCEDURE — 2060000000 HC ICU INTERMEDIATE R&B

## 2024-04-01 PROCEDURE — 6370000000 HC RX 637 (ALT 250 FOR IP)

## 2024-04-01 PROCEDURE — 36415 COLL VENOUS BLD VENIPUNCTURE: CPT

## 2024-04-01 PROCEDURE — 6360000004 HC RX CONTRAST MEDICATION: Performed by: NURSE PRACTITIONER

## 2024-04-01 PROCEDURE — 80069 RENAL FUNCTION PANEL: CPT

## 2024-04-01 PROCEDURE — 2580000003 HC RX 258: Performed by: INTERNAL MEDICINE

## 2024-04-01 PROCEDURE — 85025 COMPLETE CBC W/AUTO DIFF WBC: CPT

## 2024-04-01 PROCEDURE — 70552 MRI BRAIN STEM W/DYE: CPT

## 2024-04-01 PROCEDURE — 9990000010 HC NO CHARGE VISIT

## 2024-04-01 PROCEDURE — A9579 GAD-BASE MR CONTRAST NOS,1ML: HCPCS | Performed by: NURSE PRACTITIONER

## 2024-04-01 PROCEDURE — 95819 EEG AWAKE AND ASLEEP: CPT

## 2024-04-01 RX ORDER — LORAZEPAM 0.5 MG/1
0.5 TABLET ORAL ONCE
Status: COMPLETED | OUTPATIENT
Start: 2024-04-01 | End: 2024-04-01

## 2024-04-01 RX ORDER — NIFEDIPINE 30 MG/1
30 TABLET, EXTENDED RELEASE ORAL 2 TIMES DAILY
Status: DISCONTINUED | OUTPATIENT
Start: 2024-04-01 | End: 2024-04-03

## 2024-04-01 RX ORDER — LEVETIRACETAM 500 MG/5ML
500 INJECTION, SOLUTION, CONCENTRATE INTRAVENOUS EVERY 12 HOURS
Status: DISCONTINUED | OUTPATIENT
Start: 2024-04-01 | End: 2024-04-04

## 2024-04-01 RX ADMIN — ATORVASTATIN CALCIUM 80 MG: 80 TABLET, FILM COATED ORAL at 21:15

## 2024-04-01 RX ADMIN — TICAGRELOR 90 MG: 90 TABLET ORAL at 08:18

## 2024-04-01 RX ADMIN — RANOLAZINE 1000 MG: 500 TABLET, FILM COATED, EXTENDED RELEASE ORAL at 08:19

## 2024-04-01 RX ADMIN — MONTELUKAST 10 MG: 10 TABLET, FILM COATED ORAL at 08:18

## 2024-04-01 RX ADMIN — NIFEDIPINE 30 MG: 30 TABLET, EXTENDED RELEASE ORAL at 08:18

## 2024-04-01 RX ADMIN — IRON SUCROSE 100 MG: 20 INJECTION, SOLUTION INTRAVENOUS at 15:22

## 2024-04-01 RX ADMIN — LEVETIRACETAM 500 MG: 100 INJECTION, SOLUTION INTRAVENOUS at 12:41

## 2024-04-01 RX ADMIN — AMITRIPTYLINE HYDROCHLORIDE 40 MG: 10 TABLET, FILM COATED ORAL at 21:15

## 2024-04-01 RX ADMIN — ASPIRIN 81 MG 81 MG: 81 TABLET ORAL at 08:18

## 2024-04-01 RX ADMIN — CARVEDILOL 12.5 MG: 12.5 TABLET, FILM COATED ORAL at 08:18

## 2024-04-01 RX ADMIN — SODIUM CHLORIDE, PRESERVATIVE FREE 10 ML: 5 INJECTION INTRAVENOUS at 21:15

## 2024-04-01 RX ADMIN — BUPROPION HYDROCHLORIDE 150 MG: 150 TABLET, EXTENDED RELEASE ORAL at 08:18

## 2024-04-01 RX ADMIN — NIFEDIPINE 30 MG: 30 TABLET, EXTENDED RELEASE ORAL at 21:15

## 2024-04-01 RX ADMIN — LABETALOL HYDROCHLORIDE 10 MG: 5 INJECTION, SOLUTION INTRAVENOUS at 09:42

## 2024-04-01 RX ADMIN — INSULIN GLARGINE 8 UNITS: 100 INJECTION, SOLUTION SUBCUTANEOUS at 08:19

## 2024-04-01 RX ADMIN — SODIUM CHLORIDE, PRESERVATIVE FREE 10 ML: 5 INJECTION INTRAVENOUS at 08:19

## 2024-04-01 RX ADMIN — TICAGRELOR 90 MG: 90 TABLET ORAL at 21:15

## 2024-04-01 RX ADMIN — PANTOPRAZOLE SODIUM 40 MG: 40 TABLET, DELAYED RELEASE ORAL at 06:02

## 2024-04-01 RX ADMIN — INSULIN GLARGINE 4 UNITS: 100 INJECTION, SOLUTION SUBCUTANEOUS at 21:14

## 2024-04-01 RX ADMIN — DOCUSATE SODIUM 100 MG: 100 CAPSULE, LIQUID FILLED ORAL at 08:17

## 2024-04-01 RX ADMIN — ONDANSETRON 4 MG: 2 INJECTION INTRAMUSCULAR; INTRAVENOUS at 13:56

## 2024-04-01 RX ADMIN — LEVETIRACETAM 500 MG: 100 INJECTION, SOLUTION INTRAVENOUS at 00:28

## 2024-04-01 RX ADMIN — AZITHROMYCIN 250 MG: 250 TABLET, FILM COATED ORAL at 08:18

## 2024-04-01 RX ADMIN — BUPROPION HYDROCHLORIDE 150 MG: 150 TABLET, EXTENDED RELEASE ORAL at 21:15

## 2024-04-01 RX ADMIN — LORAZEPAM 0.5 MG: 0.5 TABLET ORAL at 12:38

## 2024-04-01 RX ADMIN — POTASSIUM CHLORIDE 10 MEQ: 750 TABLET, EXTENDED RELEASE ORAL at 08:19

## 2024-04-01 RX ADMIN — SODIUM CHLORIDE, PRESERVATIVE FREE 10 ML: 5 INJECTION INTRAVENOUS at 08:25

## 2024-04-01 RX ADMIN — RANOLAZINE 1000 MG: 500 TABLET, FILM COATED, EXTENDED RELEASE ORAL at 21:14

## 2024-04-01 RX ADMIN — LABETALOL HYDROCHLORIDE 10 MG: 5 INJECTION, SOLUTION INTRAVENOUS at 00:37

## 2024-04-01 RX ADMIN — QUETIAPINE FUMARATE 50 MG: 25 TABLET ORAL at 21:15

## 2024-04-01 RX ADMIN — CARVEDILOL 12.5 MG: 12.5 TABLET, FILM COATED ORAL at 17:57

## 2024-04-01 RX ADMIN — GADOTERIDOL 10 ML: 279.3 INJECTION, SOLUTION INTRAVENOUS at 13:20

## 2024-04-01 RX ADMIN — ONDANSETRON 4 MG: 2 INJECTION INTRAMUSCULAR; INTRAVENOUS at 06:08

## 2024-04-01 ASSESSMENT — PAIN SCALES - GENERAL
PAINLEVEL_OUTOF10: 0

## 2024-04-01 NOTE — PROGRESS NOTES
Slept well, easily aroused for routine checks. No c/o excessive discomfort. Up to bathroom with walker and stand by assist. In bed, call light in reach.

## 2024-04-01 NOTE — PROGRESS NOTES
[facility]   Speech Therapy  Daily Dysphagia Treatment Note  DISCHARGE SUMMARY    Maren Meza  AGE: 68 y.o.   GENDER: female  : 1955  [id number]  EPISODE DATE:  3/26/2024    Patient Active Problem List   Diagnosis    Hyperlipidemia    Depression    PTSD (post-traumatic stress disorder)    Insomnia    Snoring    Port-A-Cath in place    Inferior vena cava occlusion ([facility])    Chronic diastolic CHF (congestive heart failure), NYHA class 2 ([facility])    COPD (chronic obstructive pulmonary disease) ([facility])    NSTEMI (non-ST elevated myocardial infarction) ([facility])    Essential hypertension    Fibromyalgia    Type 2 diabetes mellitus with diabetic chronic kidney disease ([facility])    S/P right coronary artery (RCA) stent placement    Irritable bowel syndrome    Gastro-esophageal reflux disease without esophagitis    Chronic painful diabetic neuropathy ([facility])    Age-related nuclear cataract, bilateral    Chronic prescription opiate use    Coronary stent restenosis due to progression of disease    Hypertensive heart and chronic kidney disease with heart failure and stage 1 through stage 4 chronic kidney disease, or unspecified chronic kidney disease (HCC)    Ischemic cardiomyopathy    Moderate episode of recurrent major depressive disorder ([facility])    Regular astigmatism, bilateral    CKD (chronic kidney disease) stage 3, GFR 30-59 ml/min ([facility])    Thrombosis of superior vena cava, 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[facility])    Type 2 diabetes mellitus with mild nonproliferative diabetic retinopathy without macular edema, bilateral ([facility])    Moderate malnutrition ([facility])    Paroxysmal A-fib ([facility])    Atherosclerotic heart disease of native coronary artery without angina pectoris    Anemia, unspecified    Unspecified systolic (congestive) heart failure ([facility])    Leg pain, anterior, right    Lumbar radiculopathy, right    Claustrophobia    Low back pain radiating to right leg    Chest pain    Acute kidney injury ([facility])     No Known  Patient had declined downgrade 3/30/2024 when offered. If patient requests to resume regular solids, ST is available for re-assessment with resume order if there is med team concern for reduced tolerance of advanced diet.    Recommended Diet and Intervention 4/1/2024:  Solids: IDDSI 7 Regular- Easy To Chew (okay to resume regular if requested)   Liquids: IDDSI 0 Thin Liquids;  Meds: Meds PO as tolerated    Compensatory Swallowing Strategies:  Upright as possible with all PO intake , Small bites/sips , Eat/feed slowly, Remain upright 30-45 min     Diet order communication:   Diet texture/liquid consistency recommendation change communicated to:  [] RN  [] MD via  [] Dietary floor ambassador  [x] other: no order change    EDUCATION:   Provided education regarding role of SLP, results of assessment, recommendations and general speech pathology plan of care.   [x] Pt verbalized understanding and agreement   [] Pt requires ongoing learning   [] No evidence of comprehension     Dysphagia Prognosis: [x] good []fair []poor [x]guarded  Barriers: edentulism    Plan:     Continue Dysphagia Therapy: NO - patient politely requests ST discontinuation      Coded treatment time: 0  Total treatment time: 23    Electronically signed by   Jessica Bryant MA, CCC-SLP, #[id number]  Speech-Language Pathologist  Portable phone: [phone]  4/1/2024 at 2:15 PM

## 2024-04-01 NOTE — PLAN OF CARE
Problem: Discharge Planning  Goal: Discharge to home or other facility with appropriate resources  4/1/2024 0751 by Lynn Iqbal RN  Outcome: Progressing  3/31/2024 2302 by Martin Pederson RN  Outcome: Progressing  Flowsheets (Taken 3/31/2024 1957)  Discharge to home or other facility with appropriate resources: Identify barriers to discharge with patient and caregiver     Problem: Pain  Goal: Verbalizes/displays adequate comfort level or baseline comfort level  4/1/2024 0751 by Lynn Iqbal RN  Outcome: Progressing  3/31/2024 2302 by Martin Pederson RN  Outcome: Progressing     Problem: Skin/Tissue Integrity  Goal: Absence of new skin breakdown  Description: 1.  Monitor for areas of redness and/or skin breakdown  2.  Assess vascular access sites hourly  3.  Every 4-6 hours minimum:  Change oxygen saturation probe site  4.  Every 4-6 hours:  If on nasal continuous positive airway pressure, respiratory therapy assess nares and determine need for appliance change or resting period.  4/1/2024 0751 by Lynn Iqbal RN  Outcome: Progressing  3/31/2024 2302 by Martin Pederson RN  Outcome: Progressing     Problem: ABCDS Injury Assessment  Goal: Absence of physical injury  4/1/2024 0751 by Lynn Iqbal RN  Outcome: Progressing  3/31/2024 2302 by Martin Pederson RN  Outcome: Progressing     Problem: Safety - Adult  Goal: Free from fall injury  4/1/2024 0751 by Lynn Iqbal RN  Outcome: Progressing  3/31/2024 2302 by Martin Pederson RN  Outcome: Progressing     Problem: Chronic Conditions and Co-morbidities  Goal: Patient's chronic conditions and co-morbidity symptoms are monitored and maintained or improved  4/1/2024 0751 by Lynn Iqbal RN  Outcome: Progressing  3/31/2024 2302 by Martin Pederson RN  Outcome: Progressing  Flowsheets (Taken 3/31/2024 1957)  Care Plan - Patient's Chronic Conditions and Co-Morbidity Symptoms are Monitored and Maintained or Improved: Monitor and assess

## 2024-04-01 NOTE — PROGRESS NOTES
Pt A&Ox 4 on RA. AM assessment and vitals completed and put into flowsheets. AM medications given with no s/s of aspiration. Pt with no questions or concerns voiced to RN at this time. Fall precautions in place and call light within reach.   BP remains uncontrolled.  Vitals:    04/01/24 0402 04/01/24 0602 04/01/24 0719 04/01/24 0940   BP: (!) 174/89 (!) 145/80 (!) 177/84 (!) 185/82   Pulse: 74 87 97 72   Resp: 16  18    Temp: 98.4 °F (36.9 °C)  98.4 °F (36.9 °C)    TempSrc: Oral  Oral    SpO2: 96%  92%    Weight: 53 kg (116 lb 13.5 oz)      Height:         PRN labetalol given at 0942. Overnight, pt received PRN labetalol 2 times.     Pt pending EEG.

## 2024-04-01 NOTE — PROGRESS NOTES
V2.0  [facility] [place] Daily Progress Note      Name:  Maren Meza /Age/Sex: 1955  (68 y.o. female)   MRN & CSN:  [id number] & [id number] Encounter Date/Time: 2024 12:20 PM EDT    Location:  [id number]/5102-01 PCP: No primary care provider on file.       Hospital Day: 7    Assessment and Plan:   Maren Meza is a 68 y.o. female with medical history significant for who was admitted with chest pain, COPD exacerbation and atypical pneumonia.    Assessment/Plan :   Atypical pneumonia.  Continue azithromycin and mometasone for Metrozol.    New onset seizure (3/29) with known small meningioma.  Initiated on levetiracetam (3/29).  Followed by neurology.  MRI brain (2022) mentioned a 7mm meningioma in left superior frontal gyrus.   MRI brain with contrast and EEG are scheduled for today.  Change from IV to oral Keppra when okay with neurology.  Keep on seizure precautions.  Chest pain s/p recent MI and hsTnT 41 / 36 / 38 ng/L.  LHC showed no significant disease.  Recommendation to restart ticagrelor and continue dual anti-platelet therapy per cardiology.    History of CVA with residual right-sided weakness on aspirin and atorvastatin.   Type 2 Diabetes (uncontrolled, HgbA1c 8.8%).  Basal glargine insulin 10 units BID and cover with a \"sliding scale\" lispro moderate scale prandial correction insulin.    Acute kidney injury on chronic kidney disease, stage 3b (eGFR 24).  Losartan held by nephrology.  IV crystalloid volume expansion.   Paroxysmal atrial fibrillation:   Currently not on anticoagulation.   Iron deficiency anemia (iron saturation 14%).  S/p treatment with iron sucrose (Venofer) 200 mg IV daily for three days.   Dyslipidemia (LDL 83) on atorvastatin 80 mg nightly.   Hypokalemia with normal magnesium.  Oral potassium replacement.   Hypertension, uncontrolled.  Losartan were held by nephrology.  We may increase the dose of Coreg or start a low-dose amlodipine.  Monitor.    DVT prophylaxis:Lovenox  Eosinophils Absolute 0.0 0.0 - 0.6 K/uL    Basophils Absolute 0.0 0.0 - 0.2 K/uL   Renal Function Panel    Collection Time: 04/01/24  7:48 AM   Result Value Ref Range    Sodium 139 136 - 145 mmol/L    Potassium 3.7 3.5 - 5.1 mmol/L    Chloride 103 99 - 110 mmol/L    CO2 21 21 - 32 mmol/L    Anion Gap 15 3 - 16    Glucose 80 70 - 99 mg/dL    BUN 22 (H) 7 - 20 mg/dL    Creatinine 1.4 (H) 0.6 - 1.2 mg/dL    Est, Glom Filt Rate 41 (A) >60    Calcium 9.3 8.3 - 10.6 mg/dL    Phosphorus 3.1 2.5 - 4.9 mg/dL    Albumin 3.3 (L) 3.4 - 5.0 g/dL   POCT Glucose    Collection Time: 04/01/24  7:49 AM   Result Value Ref Range    POC Glucose 86 70 - 99 mg/dl    Performed on ACCU-CHEK         Imaging/Diagnostics Last 24 Hours   No results found.     This note was transcribed using Dragon Dictation software. Please disregard any translational errors.   Electronically signed by Shameka Jackson MD on 4/1/2024 at 12:20 PM

## 2024-04-01 NOTE — PROGRESS NOTES
Nephrology ([facility]) Progress Note    CC: KOLBY on CKD3b    Subjective:    HPI:  Breathing comfortably.  No CP.  More awake. Renal function back to baseline. Hypoglycemic in the AM.  Eating poorly due to anorexia.  ROS:  In bed.  BP running high.  PMFSH:  medications reviewed.    Objective:  Blood pressure (!) 149/73, pulse 74, temperature 98.2 °F (36.8 °C), temperature source Oral, resp. rate 16, height 1.524 m (5'), weight 53 kg (116 lb 13.5 oz), SpO2 100 %, not currently breastfeeding.    Intake/Output Summary (Last 24 hours) at 4/1/2024 1437  Last data filed at 4/1/2024 1257  Gross per 24 hour   Intake 632.28 ml   Output 1600 ml   Net -967.72 ml       General:  NAD, A+Ox3  Chest:  CTAB  CVS:  RRR  Abdominal:  NTND, soft, +BS  Extremities:  no edema  Skin:  no rash    Labs:  Renal panel:  Lab Results   Component Value Date/Time     04/01/2024 07:48 AM    K 3.7 04/01/2024 07:48 AM    K 4.6 03/27/2024 07:18 AM    CO2 21 04/01/2024 07:48 AM    BUN 22 (H) 04/01/2024 07:48 AM    CREATININE 1.4 (H) 04/01/2024 07:48 AM    CALCIUM 9.3 04/01/2024 07:48 AM    PHOS 3.1 04/01/2024 07:48 AM    MG 2.20 03/30/2024 04:28 AM     CBC:  Lab Results   Component Value Date/Time    WBC 10.0 04/01/2024 07:48 AM    HGB 9.3 (L) 04/01/2024 07:48 AM    HCT 27.9 (L) 04/01/2024 07:48 AM     04/01/2024 07:48 AM       Assessment/Plan:  Reviewed old records and labs.    KOLBY on CKD3b: baseline Cr ~ 1.5-1.8 mg/dL. Follows with Dr. Chung in office. Initially secondary LYNN then from volume depletion due to poor PO intake.            - Pk Cr 2.4 mg/dL. Cr 1.4 mg/dL this AM. Back within baseline.               - Continue holding losartan for now              - Avoid hypotension. Avoid nephrotoxic agents when possible              - Daily RFTs     Chest Pain / CAD              - Trop 41>36>38              - LHC showed no significant disease     ?PNA              - CXR (3/26/2024): patchy opacities in the right lung  base, favored to represent atelectasis              - On abx              - Pulmonology following     COPD     Hypertension              - Remains uncontrolled   - Increase nifedipine to 30 mg BID   - Continue carvedilol    Anemia   - Hgb 9.3 g/dL              - Iron sat 14 -- venofer ordered     CKD-MBD              - Monitor    AMS   - seizure with post-ictal state  - currently much better    seizure   - neurology consulted  - keppra started    DM2    CYRUS Fowler - CNP    Will discuss with nephrology attending physician, Dr. Patel.  See attestation for additional recommendations.

## 2024-04-01 NOTE — PROGRESS NOTES
Physical Therapy  Daily Treatment Attempt    24    Name: Maren Meza   : 1955    MRN: [id number]    PT follow-up attempt. Patient out of room for MRI. Will continue to follow.    Electronically signed by Gera Palomares PT on 2024 at 2:23 PM

## 2024-04-01 NOTE — PROGRESS NOTES
Significant shift events:   Blood pressure remains uncontrolled.  MRI with contrast completed. Pending results.  EEG: \"This EEG was within normal limits for a patient of this age in the awake and drowsy state. No epileptiform discharges were seen\".         I/O this shift:  In: 480 [P.O.:480]  Out: 700 [Urine:700]  Pt with very poor PO intake today. Pt encouraged to eat, educated on her low blood sugars. Pt states understanding. Was provided with juice and saltines / peanut butter at dinner time d/t refusing dinner.        Vitals:    04/01/24 1027 04/01/24 1130 04/01/24 1415 04/01/24 1528   BP: (S) (!) 147/60 (!) 154/66 (!) 149/73 (!) 151/70   Pulse: 67 67 74 81   Resp:  16  18   Temp:  98.2 °F (36.8 °C)  98.6 °F (37 °C)   TempSrc:  Oral  Oral   SpO2:  100%  98%   Weight:       Height:               PRN medications given this shift:   Labetalol given at 0942 (Q4)  Zofran given at 1356 (Q6)    Pt is from: [facility]  Plan for discharge: Return with PT/OT

## 2024-04-01 NOTE — PLAN OF CARE
Problem: Discharge Planning  Goal: Discharge to home or other facility with appropriate resources  3/31/2024 2302 by Martin Pederson RN  Outcome: Progressing  Flowsheets (Taken 3/31/2024 1957)  Discharge to home or other facility with appropriate resources: Identify barriers to discharge with patient and caregiver     Problem: Pain  Goal: Verbalizes/displays adequate comfort level or baseline comfort level  3/31/2024 2302 by Martin Pederson RN  Outcome: Progressing     Problem: Skin/Tissue Integrity  Goal: Absence of new skin breakdown  Description: 1.  Monitor for areas of redness and/or skin breakdown  2.  Assess vascular access sites hourly  3.  Every 4-6 hours minimum:  Change oxygen saturation probe site  4.  Every 4-6 hours:  If on nasal continuous positive airway pressure, respiratory therapy assess nares and determine need for appliance change or resting period.  3/31/2024 2302 by Martin Pederson RN  Outcome: Progressing     Problem: ABCDS Injury Assessment  Goal: Absence of physical injury  3/31/2024 2302 by Martin Pederson RN  Outcome: Progressing     Problem: Respiratory - Adult  Goal: Achieves optimal ventilation and oxygenation  3/31/2024 2302 by Martin Pederson RN  Outcome: Progressing  Flowsheets (Taken 3/31/2024 1957)  Achieves optimal ventilation and oxygenation: Assess for changes in respiratory status     Problem: Cardiovascular - Adult  Goal: Maintains optimal cardiac output and hemodynamic stability  3/31/2024 2302 by Martin Pederson RN  Outcome: Progressing  Flowsheets (Taken 3/31/2024 1957)  Maintains optimal cardiac output and hemodynamic stability: Monitor blood pressure and heart rate

## 2024-04-01 NOTE — PROGRESS NOTES
Called EEG to verify that pt's test would be completed today. Technician states it will be done today, but couldn't give exact time. Pt updated on this and is agreeable.

## 2024-04-01 NOTE — PROCEDURES
Name: Maren Meza  MRN: [id number]  : 1955  Interpreting Physician: Molly Jackson MD   Referring Physician: Shameka Jackson MD  Date of EE2024    Clinical History:  Maren Meza is a 68 y.o. female with a reported history of new osnet seizure    Current Antiepileptic Medications:    levETIRAcetam  500 mg IntraVENous Q12H    NIFEdipine  30 mg Oral BID    iron sucrose  100 mg IntraVENous Q24H    potassium chloride  10 mEq Oral Daily    insulin glargine  8 Units SubCUTAneous BID    carvedilol  12.5 mg Oral BID WC    ticagrelor  90 mg Oral BID    insulin lispro  0-16 Units SubCUTAneous 4x Daily WC    sodium chloride flush  5-40 mL IntraVENous 2 times per day    azithromycin  250 mg Oral Daily    docusate sodium  100 mg Oral Daily    ranolazine  1,000 mg Oral BID    pantoprazole  40 mg Oral QAM AC    montelukast  10 mg Oral Daily    linaclotide  290 mcg Oral QAM AC    mometasone-formoterol  2 puff Inhalation BID RT    buPROPion  150 mg Oral BID    atorvastatin  80 mg Oral Nightly    aspirin  81 mg Oral Daily    amitriptyline  40 mg Oral Nightly    sodium chloride flush  5-40 mL IntraVENous 2 times per day       Indication: new onset seizure      Technical Summary:  20 channels of EEG were recorded in a digital format on a patient who was reported to be awake and drowsy state during the recording. The patient was not sleep deprived prior to the EEG.    The recording revealed a normal background with a well-developed alpha frequency rhythm that was most prominent in the posterior head region and was reactive to eye opening and closure. Significant muscle artifact was present in the bifrontal leads throughout the recording. No definitive epileptiform discharges were seen.     Photic stimulation was performed at various flash frequencies and revealed a driving response    Hyperventilation was not performed due to medical condition.  During the recording stage II sleep was not seen.     The EKG lead

## 2024-04-02 LAB
ALBUMIN SERPL-MCNC: 2.7 G/DL (ref 3.4–5)
ANION GAP SERPL CALCULATED.3IONS-SCNC: 12 MMOL/L (ref 3–16)
BASOPHILS # BLD: 0 K/UL (ref 0–0.2)
BASOPHILS NFR BLD: 0.1 %
BUN SERPL-MCNC: 20 MG/DL (ref 7–20)
CALCIUM SERPL-MCNC: 8.8 MG/DL (ref 8.3–10.6)
CHLORIDE SERPL-SCNC: 105 MMOL/L (ref 99–110)
CO2 SERPL-SCNC: 20 MMOL/L (ref 21–32)
CREAT SERPL-MCNC: 1.7 MG/DL (ref 0.6–1.2)
DEPRECATED RDW RBC AUTO: 16 % (ref 12.4–15.4)
EOSINOPHIL # BLD: 0.1 K/UL (ref 0–0.6)
EOSINOPHIL NFR BLD: 1.8 %
GFR SERPLBLD CREATININE-BSD FMLA CKD-EPI: 33 ML/MIN/{1.73_M2}
GLUCOSE BLD-MCNC: 117 MG/DL (ref 70–99)
GLUCOSE BLD-MCNC: 172 MG/DL (ref 70–99)
GLUCOSE BLD-MCNC: 214 MG/DL (ref 70–99)
GLUCOSE BLD-MCNC: 79 MG/DL (ref 70–99)
GLUCOSE SERPL-MCNC: 73 MG/DL (ref 70–99)
HCT VFR BLD AUTO: 26.7 % (ref 36–48)
HGB BLD-MCNC: 9 G/DL (ref 12–16)
LYMPHOCYTES # BLD: 1.2 K/UL (ref 1–5.1)
LYMPHOCYTES NFR BLD: 15.2 %
MAGNESIUM SERPL-MCNC: 1.8 MG/DL (ref 1.8–2.4)
MCH RBC QN AUTO: 30.8 PG (ref 26–34)
MCHC RBC AUTO-ENTMCNC: 33.6 G/DL (ref 31–36)
MCV RBC AUTO: 91.5 FL (ref 80–100)
MONOCYTES # BLD: 0.6 K/UL (ref 0–1.3)
MONOCYTES NFR BLD: 7.4 %
NEUTROPHILS # BLD: 5.8 K/UL (ref 1.7–7.7)
NEUTROPHILS NFR BLD: 75.5 %
PERFORMED ON: ABNORMAL
PERFORMED ON: NORMAL
PHOSPHATE SERPL-MCNC: 3.1 MG/DL (ref 2.5–4.9)
PLATELET # BLD AUTO: 297 K/UL (ref 135–450)
PMV BLD AUTO: 7.2 FL (ref 5–10.5)
POTASSIUM SERPL-SCNC: 3.5 MMOL/L (ref 3.5–5.1)
POTASSIUM SERPL-SCNC: 3.5 MMOL/L (ref 3.5–5.1)
RBC # BLD AUTO: 2.92 M/UL (ref 4–5.2)
SODIUM SERPL-SCNC: 137 MMOL/L (ref 136–145)
WBC # BLD AUTO: 7.7 K/UL (ref 4–11)

## 2024-04-02 PROCEDURE — 6360000002 HC RX W HCPCS: Performed by: INTERNAL MEDICINE

## 2024-04-02 PROCEDURE — 97530 THERAPEUTIC ACTIVITIES: CPT

## 2024-04-02 PROCEDURE — 94760 N-INVAS EAR/PLS OXIMETRY 1: CPT

## 2024-04-02 PROCEDURE — 6370000000 HC RX 637 (ALT 250 FOR IP): Performed by: INTERNAL MEDICINE

## 2024-04-02 PROCEDURE — 97116 GAIT TRAINING THERAPY: CPT

## 2024-04-02 PROCEDURE — 6370000000 HC RX 637 (ALT 250 FOR IP)

## 2024-04-02 PROCEDURE — 80069 RENAL FUNCTION PANEL: CPT

## 2024-04-02 PROCEDURE — 85025 COMPLETE CBC W/AUTO DIFF WBC: CPT

## 2024-04-02 PROCEDURE — 6370000000 HC RX 637 (ALT 250 FOR IP): Performed by: REGISTERED NURSE

## 2024-04-02 PROCEDURE — 2580000003 HC RX 258: Performed by: INTERNAL MEDICINE

## 2024-04-02 PROCEDURE — 36415 COLL VENOUS BLD VENIPUNCTURE: CPT

## 2024-04-02 PROCEDURE — 2060000000 HC ICU INTERMEDIATE R&B

## 2024-04-02 PROCEDURE — 83735 ASSAY OF MAGNESIUM: CPT

## 2024-04-02 RX ORDER — WATER 10 ML/10ML
INJECTION INTRAMUSCULAR; INTRAVENOUS; SUBCUTANEOUS
Status: DISCONTINUED
Start: 2024-04-02 | End: 2024-04-02 | Stop reason: WASHOUT

## 2024-04-02 RX ADMIN — LEVETIRACETAM 500 MG: 100 INJECTION, SOLUTION INTRAVENOUS at 00:11

## 2024-04-02 RX ADMIN — AZITHROMYCIN 250 MG: 250 TABLET, FILM COATED ORAL at 08:57

## 2024-04-02 RX ADMIN — ONDANSETRON 4 MG: 2 INJECTION INTRAMUSCULAR; INTRAVENOUS at 08:46

## 2024-04-02 RX ADMIN — ACETAMINOPHEN, ASPIRIN, CAFFEINE 1 TABLET: 250; 65; 250 TABLET, FILM COATED ORAL at 20:19

## 2024-04-02 RX ADMIN — MONTELUKAST 10 MG: 10 TABLET, FILM COATED ORAL at 08:57

## 2024-04-02 RX ADMIN — CARVEDILOL 12.5 MG: 12.5 TABLET, FILM COATED ORAL at 08:57

## 2024-04-02 RX ADMIN — NIFEDIPINE 30 MG: 30 TABLET, EXTENDED RELEASE ORAL at 20:19

## 2024-04-02 RX ADMIN — PANTOPRAZOLE SODIUM 40 MG: 40 TABLET, DELAYED RELEASE ORAL at 05:12

## 2024-04-02 RX ADMIN — ATORVASTATIN CALCIUM 80 MG: 80 TABLET, FILM COATED ORAL at 20:19

## 2024-04-02 RX ADMIN — RANOLAZINE 1000 MG: 500 TABLET, FILM COATED, EXTENDED RELEASE ORAL at 20:19

## 2024-04-02 RX ADMIN — BUPROPION HYDROCHLORIDE 150 MG: 150 TABLET, EXTENDED RELEASE ORAL at 08:57

## 2024-04-02 RX ADMIN — LEVETIRACETAM 500 MG: 100 INJECTION, SOLUTION INTRAVENOUS at 14:40

## 2024-04-02 RX ADMIN — ASPIRIN 81 MG 81 MG: 81 TABLET ORAL at 08:57

## 2024-04-02 RX ADMIN — NIFEDIPINE 30 MG: 30 TABLET, EXTENDED RELEASE ORAL at 08:57

## 2024-04-02 RX ADMIN — RANOLAZINE 1000 MG: 500 TABLET, FILM COATED, EXTENDED RELEASE ORAL at 08:56

## 2024-04-02 RX ADMIN — INSULIN GLARGINE 4 UNITS: 100 INJECTION, SOLUTION SUBCUTANEOUS at 21:20

## 2024-04-02 RX ADMIN — SODIUM CHLORIDE, PRESERVATIVE FREE 10 ML: 5 INJECTION INTRAVENOUS at 21:20

## 2024-04-02 RX ADMIN — BUPROPION HYDROCHLORIDE 150 MG: 150 TABLET, EXTENDED RELEASE ORAL at 20:19

## 2024-04-02 RX ADMIN — Medication 12.5 MG: at 14:53

## 2024-04-02 RX ADMIN — SODIUM CHLORIDE, PRESERVATIVE FREE 10 ML: 5 INJECTION INTRAVENOUS at 08:46

## 2024-04-02 RX ADMIN — TICAGRELOR 90 MG: 90 TABLET ORAL at 20:19

## 2024-04-02 RX ADMIN — DOCUSATE SODIUM 100 MG: 100 CAPSULE, LIQUID FILLED ORAL at 08:57

## 2024-04-02 RX ADMIN — POTASSIUM CHLORIDE 10 MEQ: 750 TABLET, EXTENDED RELEASE ORAL at 08:57

## 2024-04-02 RX ADMIN — CARVEDILOL 12.5 MG: 12.5 TABLET, FILM COATED ORAL at 18:07

## 2024-04-02 RX ADMIN — AMITRIPTYLINE HYDROCHLORIDE 40 MG: 10 TABLET, FILM COATED ORAL at 20:19

## 2024-04-02 RX ADMIN — TICAGRELOR 90 MG: 90 TABLET ORAL at 08:57

## 2024-04-02 RX ADMIN — IRON SUCROSE 100 MG: 20 INJECTION, SOLUTION INTRAVENOUS at 14:40

## 2024-04-02 ASSESSMENT — PAIN DESCRIPTION - DESCRIPTORS: DESCRIPTORS: ACHING

## 2024-04-02 ASSESSMENT — PAIN SCALES - GENERAL
PAINLEVEL_OUTOF10: 9
PAINLEVEL_OUTOF10: 9

## 2024-04-02 ASSESSMENT — PAIN - FUNCTIONAL ASSESSMENT: PAIN_FUNCTIONAL_ASSESSMENT: ACTIVITIES ARE NOT PREVENTED

## 2024-04-02 ASSESSMENT — PAIN DESCRIPTION - ORIENTATION: ORIENTATION: INNER

## 2024-04-02 ASSESSMENT — PAIN DESCRIPTION - ONSET: ONSET: ON-GOING

## 2024-04-02 ASSESSMENT — PAIN DESCRIPTION - PAIN TYPE: TYPE: ACUTE PAIN

## 2024-04-02 ASSESSMENT — PAIN DESCRIPTION - LOCATION: LOCATION: HEAD

## 2024-04-02 ASSESSMENT — PAIN DESCRIPTION - FREQUENCY: FREQUENCY: INTERMITTENT

## 2024-04-02 NOTE — PROGRESS NOTES
Nephrology ([facility]) Progress Note    CC: KOLBY on CKD3b    Subjective:    HPI:  Breathing comfortably.  No CP.  More awake. Renal function remains within baseline. BP better  ROS:  Denies SOB  PMFSH:  medications reviewed.    Objective:  Blood pressure 131/76, pulse 80, temperature 98.2 °F (36.8 °C), temperature source Oral, resp. rate 16, height 1.524 m (5'), weight 52.7 kg (116 lb 2.9 oz), SpO2 99 %, not currently breastfeeding.    Intake/Output Summary (Last 24 hours) at 4/2/2024 1423  Last data filed at 4/2/2024 1110  Gross per 24 hour   Intake 480 ml   Output 800 ml   Net -320 ml       General:  NAD, A+Ox3  Chest:  CTAB  CVS:  RRR  Abdominal:  NTND, soft, +BS  Extremities:  no edema  Skin:  no rash    Labs:  Renal panel:  Lab Results   Component Value Date/Time     04/02/2024 04:15 AM    K 3.5 04/02/2024 04:15 AM    K 3.5 04/02/2024 04:15 AM    CO2 20 (L) 04/02/2024 04:15 AM    BUN 20 04/02/2024 04:15 AM    CREATININE 1.7 (H) 04/02/2024 04:15 AM    CALCIUM 8.8 04/02/2024 04:15 AM    PHOS 3.1 04/02/2024 04:15 AM    MG 1.80 04/02/2024 04:15 AM     CBC:  Lab Results   Component Value Date/Time    WBC 7.7 04/02/2024 04:15 AM    HGB 9.0 (L) 04/02/2024 04:15 AM    HCT 26.7 (L) 04/02/2024 04:15 AM     04/02/2024 04:15 AM       Assessment/Plan:  Reviewed old records and labs.    KOLBY on CKD3b: baseline Cr ~ 1.5-1.8 mg/dL. Follows with Dr. Chung in office. Initially secondary LYNN then from volume depletion due to poor PO intake.            - Pk Cr 2.4 mg/dL. Cr 1.7 mg/dL this AM. Back within baseline.               - Continue holding losartan               - Avoid hypotension. Avoid nephrotoxic agents when possible              - Daily RFTs     Chest Pain / CAD              - Trop 41>36>38              - LHC showed no significant disease     ?PNA              - CXR (3/26/2024): patchy opacities in the right lung base, favored to represent atelectasis              - On abx              -  Pulmonology following     COPD     Hypertension              - BP trending down   - Continue carvedilol and nifedipine ( increased to 30 mg BID 4/1)    Anemia   - Hgb 9.0 g/dL              - Iron sat 14 -- receiving venofer      CKD-MBD              - Monitor    AMS   - seizure with post-ictal state  - currently much better    seizure    - MRI head (4/2/2024): unchanged 7 x 5 mm dural-based legion of the L superior temporal gyrus  - neurology consulted  - keppra started    DM2    CYRUS Fowler - CNP    Will discuss with nephrology attending physician, Dr. Patel.  See attestation for additional recommendations.

## 2024-04-02 NOTE — PROGRESS NOTES
Occupational Therapy Attempt Note  Maren Susana  1955  [id number]    OT attempting to see pt for tx session this date, however pt declining therapy due to fatigue at this time. Pt reporting she will work with therapy tomorrow. PT saw pt earlier this date, along with pt reporting she has been up in the room with RW and staff. Also reports she sat in recliner chair for ~2 hours earlier this date. Will attempt back as therapy schedule allows and pt willing to participate. If pt discharged prior to next therapy session, please refer to last therapy note as discharge summary.     Electronically signed by USHA Heller/L on 4/2/2024 at 1:31 PM

## 2024-04-02 NOTE — PROGRESS NOTES
Physical Therapy  Facility/Department: [address] PROGRESSIVE CARE  Physical Therapy treatment session    Name: Maren Meza  : 1955  MRN: [id number]  Date of Service: 2024    Discharge Recommendations:  24 hour supervision or assist, Home with Home health PT, Patient would benefit from continued therapy after discharge   PT Equipment Recommendations  Equipment Needed: No    Maren Meza scored a 19/24 on the AM-PAC short mobility form. Current research shows that an AM-PAC score of 18 or greater is typically associated with a discharge to the patient's home setting. Based on the patient's AM-PAC score and their current functional mobility deficits, it is recommended that the patient have 2-3 sessions per week of Physical Therapy at d/c to increase the patient's independence.  At this time, this patient demonstrates the endurance and safety to discharge home with home PT and a follow up treatment frequency of 2-3x/wk.  Please see assessment section for further patient specific details.    If patient discharges prior to next session this note will serve as a discharge summary.  Please see below for the latest assessment towards goals.         Patient Diagnosis(es): The primary encounter diagnosis was Chest pain, unspecified type. Diagnoses of Elevated troponin and Acute kidney injury superimposed on chronic kidney disease (HCC) were also pertinent to this visit.  Past Medical History:  has a past medical history of Acid reflux, Anemia, Anxiety and depression, Arthritis, Asthma, Atrial fibrillation ([facility]), CAD (coronary artery disease), Cerebral artery occlusion with cerebral infarction ([facility]), CHF (congestive heart failure) ([facility]), Chronic kidney disease--stage III, COPD (chronic obstructive pulmonary disease) ([facility]), DM2 (diabetes mellitus, type 2) ([facility]), Dysarthria, Fibromyalgia, Headache(784.0), Hemisensory loss, History of blood transfusion, Hyperlipidemia, Hypertension, IBS (irritable bowel syndrome),

## 2024-04-02 NOTE — PROGRESS NOTES
Slept well, easily aroused for routine checks. Continues to have poor PO intake, did not eat much dinner. HS blood glucose 117, gave half dose of scheduled lantus which equals 4 units lantus total @ HS. No c/o excessive discomfort. Up to bathroom with walker and stand by assist. In bed, call light in reach.

## 2024-04-02 NOTE — PROGRESS NOTES
Progress Note  The patient is being evaluated for seizure, r/o stroke     Updates  No seizure like activity. Seems to be tolerating Keppra.   Discussed with nursing-patient had been mentating well, oriented.     MRI brain w/ completed- stable known meningioma.       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[facility]) 02/14/2014    Chronic diastolic CHF (congestive heart failure), NYHA class 2 ([facility]) 04/28/2015    COPD (chronic obstructive pulmonary disease) ([facility]) 04/29/2015    NSTEMI (non-ST elevated myocardial infarction) ([facility]) 05/07/2015    Essential hypertension     Fibromyalgia 06/07/2016    Type 2 diabetes mellitus with diabetic chronic kidney disease ([facility]) 11/28/2016    S/P right coronary artery (RCA) stent placement 11/28/2016    Irritable bowel syndrome 05/05/2017    Gastro-esophageal reflux disease without esophagitis 05/05/2017    Chronic painful diabetic neuropathy ([facility]) 01/05/2018    Age-related nuclear cataract, bilateral 01/24/2020    Chronic prescription opiate use 06/29/2018    Coronary stent restenosis due to progression of disease 10/03/2019    Hypertensive heart and chronic kidney disease with heart failure and stage 1 through stage 4 chronic kidney disease, or unspecified chronic kidney disease ([facility]) 05/05/2017    Ischemic cardiomyopathy 10/03/2019    Moderate episode of recurrent major depressive disorder ([facility]) 05/05/2017    Regular astigmatism, bilateral 01/24/2020    CKD (chronic kidney disease) stage 3, GFR 30-59 ml/min ([facility]) 10/03/2019    Thrombosis of superior vena cava, 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

## 2024-04-02 NOTE — PLAN OF CARE
Problem: Discharge Planning  Goal: Discharge to home or other facility with appropriate resources  Outcome: Progressing  Flowsheets (Taken 4/1/2024 2109)  Discharge to home or other facility with appropriate resources: Identify barriers to discharge with patient and caregiver     Problem: Pain  Goal: Verbalizes/displays adequate comfort level or baseline comfort level  Outcome: Progressing     Problem: Skin/Tissue Integrity  Goal: Absence of new skin breakdown  Description: 1.  Monitor for areas of redness and/or skin breakdown  2.  Assess vascular access sites hourly  3.  Every 4-6 hours minimum:  Change oxygen saturation probe site  4.  Every 4-6 hours:  If on nasal continuous positive airway pressure, respiratory therapy assess nares and determine need for appliance change or resting period.  Outcome: Progressing     Problem: ABCDS Injury Assessment  Goal: Absence of physical injury  Outcome: Progressing     Problem: Safety - Adult  Goal: Free from fall injury  Outcome: Progressing

## 2024-04-02 NOTE — PROGRESS NOTES
ischemic changes.   3. Chronic lacunar infarct within the right shahnaz.   4. Minimal cerebellar tonsillar ectopia.   5. No significant stenosis or large vessel occlusion of the [id number].   6. No convincing flow limiting stenosis of the visualized cervical   carotid/vertebral arteries.   7. Suggestion of less than 50% stenosis of the proximal right cervical ICA by   NASCET criteria.         CT HEAD WO CONTRAST   Final Result   No acute intracranial process.      Findings were discussed with V[place] SLOAN by the radiology call center   at 09:43 on 3/29/2024.         CT CHEST WO CONTRAST   Final Result   Patchy ground-glass opacity throughout the right left hemithorax most   pronounced in the right base.      Mild stable cardiac enlargement.         VL DUP LOWER EXTREMITY ARTERIES LEFT   Final Result      XR CHEST PORTABLE   Final Result   Patchy opacities in the right lung base, favored to represent atelectasis   however airspace disease not excluded.             IP CONSULT TO CARDIOLOGY  IP CONSULT TO PULMONOLOGY  IP CONSULT TO CARDIOLOGY  IP CONSULT TO NEPHROLOGY  IP CONSULT TO NEUROLOGY    Assessment/Plan:    Active Hospital Problems    Diagnosis     Chest pain [R07.9]      Priority: Anson Meza is a 68 y.o. female with medical history significant for who was admitted with chest pain, COPD exacerbation and atypical pneumonia.     Assessment/Plan :   Atypical pneumonia.  Continue azithromycin and mometasone for Metrozol.    New onset seizure (3/29) with known small meningioma.  Initiated on levetiracetam (3/29).  Followed by neurology.  MRI brain (June 2022) mentioned a 7mm meningioma in left superior frontal gyrus.   MRI brain with contrast and EEG done.  Change from IV to oral Keppra when okay with neurology.  Keep on seizure precautions.  Chest pain s/p recent MI and hsTnT 41 / 36 / 38 ng/L.  LHC showed no significant disease.  Recommendation to restart ticagrelor and continue dual

## 2024-04-03 ENCOUNTER — APPOINTMENT (OUTPATIENT)
Dept: GENERAL RADIOLOGY | Age: 69
DRG: 193 | End: 2024-04-03
Payer: COMMERCIAL

## 2024-04-03 LAB
ALBUMIN SERPL-MCNC: 3.3 G/DL (ref 3.4–5)
ANION GAP SERPL CALCULATED.3IONS-SCNC: 12 MMOL/L (ref 3–16)
BASOPHILS # BLD: 0 K/UL (ref 0–0.2)
BASOPHILS NFR BLD: 0.1 %
BUN SERPL-MCNC: 19 MG/DL (ref 7–20)
CALCIUM SERPL-MCNC: 8.8 MG/DL (ref 8.3–10.6)
CHLORIDE SERPL-SCNC: 102 MMOL/L (ref 99–110)
CO2 SERPL-SCNC: 23 MMOL/L (ref 21–32)
CREAT SERPL-MCNC: 1.6 MG/DL (ref 0.6–1.2)
DEPRECATED RDW RBC AUTO: 16.2 % (ref 12.4–15.4)
EKG ATRIAL RATE: 76 BPM
EKG DIAGNOSIS: NORMAL
EKG P AXIS: -15 DEGREES
EKG P-R INTERVAL: 134 MS
EKG Q-T INTERVAL: 414 MS
EKG QRS DURATION: 98 MS
EKG QTC CALCULATION (BAZETT): 465 MS
EKG R AXIS: 42 DEGREES
EKG T AXIS: 81 DEGREES
EKG VENTRICULAR RATE: 76 BPM
EOSINOPHIL # BLD: 0.1 K/UL (ref 0–0.6)
EOSINOPHIL NFR BLD: 2.1 %
GFR SERPLBLD CREATININE-BSD FMLA CKD-EPI: 35 ML/MIN/{1.73_M2}
GLUCOSE BLD-MCNC: 154 MG/DL (ref 70–99)
GLUCOSE BLD-MCNC: 81 MG/DL (ref 70–99)
GLUCOSE BLD-MCNC: 81 MG/DL (ref 70–99)
GLUCOSE BLD-MCNC: 86 MG/DL (ref 70–99)
GLUCOSE SERPL-MCNC: 71 MG/DL (ref 70–99)
HCT VFR BLD AUTO: 27.9 % (ref 36–48)
HGB BLD-MCNC: 9.3 G/DL (ref 12–16)
LEVETIRACETAM SERPL-MCNC: 35.9 UG/ML (ref 6–46)
LYMPHOCYTES # BLD: 1.1 K/UL (ref 1–5.1)
LYMPHOCYTES NFR BLD: 15.4 %
MCH RBC QN AUTO: 30.4 PG (ref 26–34)
MCHC RBC AUTO-ENTMCNC: 33.2 G/DL (ref 31–36)
MCV RBC AUTO: 91.5 FL (ref 80–100)
MEDICATION DOSE-MCNC: NORMAL
MONOCYTES # BLD: 0.5 K/UL (ref 0–1.3)
MONOCYTES NFR BLD: 7.9 %
NEUTROPHILS # BLD: 5.1 K/UL (ref 1.7–7.7)
NEUTROPHILS NFR BLD: 74.5 %
PERFORMED ON: ABNORMAL
PERFORMED ON: NORMAL
PHOSPHATE SERPL-MCNC: 3.3 MG/DL (ref 2.5–4.9)
PLATELET # BLD AUTO: 306 K/UL (ref 135–450)
PMV BLD AUTO: 7.3 FL (ref 5–10.5)
POTASSIUM SERPL-SCNC: 3.6 MMOL/L (ref 3.5–5.1)
RBC # BLD AUTO: 3.05 M/UL (ref 4–5.2)
SODIUM SERPL-SCNC: 137 MMOL/L (ref 136–145)
WBC # BLD AUTO: 6.8 K/UL (ref 4–11)

## 2024-04-03 PROCEDURE — 85025 COMPLETE CBC W/AUTO DIFF WBC: CPT

## 2024-04-03 PROCEDURE — 6370000000 HC RX 637 (ALT 250 FOR IP): Performed by: INTERNAL MEDICINE

## 2024-04-03 PROCEDURE — 71045 X-RAY EXAM CHEST 1 VIEW: CPT

## 2024-04-03 PROCEDURE — 6370000000 HC RX 637 (ALT 250 FOR IP)

## 2024-04-03 PROCEDURE — 94760 N-INVAS EAR/PLS OXIMETRY 1: CPT

## 2024-04-03 PROCEDURE — 93005 ELECTROCARDIOGRAM TRACING: CPT | Performed by: INTERNAL MEDICINE

## 2024-04-03 PROCEDURE — 2580000003 HC RX 258: Performed by: INTERNAL MEDICINE

## 2024-04-03 PROCEDURE — 80069 RENAL FUNCTION PANEL: CPT

## 2024-04-03 PROCEDURE — 6360000002 HC RX W HCPCS: Performed by: INTERNAL MEDICINE

## 2024-04-03 PROCEDURE — 2060000000 HC ICU INTERMEDIATE R&B

## 2024-04-03 PROCEDURE — 93010 ELECTROCARDIOGRAM REPORT: CPT | Performed by: INTERNAL MEDICINE

## 2024-04-03 PROCEDURE — 80177 DRUG SCRN QUAN LEVETIRACETAM: CPT

## 2024-04-03 PROCEDURE — 36415 COLL VENOUS BLD VENIPUNCTURE: CPT

## 2024-04-03 PROCEDURE — 6370000000 HC RX 637 (ALT 250 FOR IP): Performed by: REGISTERED NURSE

## 2024-04-03 RX ORDER — 0.9 % SODIUM CHLORIDE 0.9 %
250 INTRAVENOUS SOLUTION INTRAVENOUS ONCE
Status: COMPLETED | OUTPATIENT
Start: 2024-04-03 | End: 2024-04-03

## 2024-04-03 RX ORDER — CARVEDILOL 3.12 MG/1
3.12 TABLET ORAL 2 TIMES DAILY WITH MEALS
Qty: 60 TABLET | Refills: 3 | Status: SHIPPED | OUTPATIENT
Start: 2024-04-04

## 2024-04-03 RX ORDER — INSULIN GLARGINE 100 [IU]/ML
8 INJECTION, SOLUTION SUBCUTANEOUS EVERY MORNING
Status: DISCONTINUED | OUTPATIENT
Start: 2024-04-04 | End: 2024-04-05

## 2024-04-03 RX ORDER — BUPROPION HYDROCHLORIDE 150 MG/1
150 TABLET, EXTENDED RELEASE ORAL DAILY
Qty: 30 TABLET | Refills: 0 | Status: SHIPPED | OUTPATIENT
Start: 2024-04-03 | End: 2024-04-04 | Stop reason: HOSPADM

## 2024-04-03 RX ORDER — SUMATRIPTAN 25 MG/1
25 TABLET, FILM COATED ORAL ONCE
Status: COMPLETED | OUTPATIENT
Start: 2024-04-03 | End: 2024-04-03

## 2024-04-03 RX ORDER — CARVEDILOL 3.12 MG/1
3.12 TABLET ORAL 2 TIMES DAILY WITH MEALS
Status: DISCONTINUED | OUTPATIENT
Start: 2024-04-04 | End: 2024-04-05 | Stop reason: HOSPADM

## 2024-04-03 RX ORDER — MIDODRINE HYDROCHLORIDE 5 MG/1
5 TABLET ORAL
Qty: 90 TABLET | Refills: 3 | Status: SHIPPED | OUTPATIENT
Start: 2024-04-03

## 2024-04-03 RX ORDER — CARVEDILOL 12.5 MG/1
12.5 TABLET ORAL 2 TIMES DAILY WITH MEALS
Qty: 60 TABLET | Refills: 3 | Status: SHIPPED | OUTPATIENT
Start: 2024-04-03 | End: 2024-04-03 | Stop reason: HOSPADM

## 2024-04-03 RX ORDER — MIDODRINE HYDROCHLORIDE 5 MG/1
5 TABLET ORAL
Status: DISCONTINUED | OUTPATIENT
Start: 2024-04-03 | End: 2024-04-04

## 2024-04-03 RX ORDER — INSULIN GLARGINE 100 [IU]/ML
10 INJECTION, SOLUTION SUBCUTANEOUS NIGHTLY
Qty: 8 ADJUSTABLE DOSE PRE-FILLED PEN SYRINGE | Refills: 4 | Status: SHIPPED | OUTPATIENT
Start: 2024-04-03

## 2024-04-03 RX ORDER — NIFEDIPINE 30 MG/1
60 TABLET, EXTENDED RELEASE ORAL 2 TIMES DAILY
Status: DISCONTINUED | OUTPATIENT
Start: 2024-04-03 | End: 2024-04-04

## 2024-04-03 RX ORDER — NIFEDIPINE 60 MG/1
60 TABLET, EXTENDED RELEASE ORAL DAILY
Qty: 90 TABLET | Refills: 1 | Status: SHIPPED | OUTPATIENT
Start: 2024-04-03 | End: 2024-04-04 | Stop reason: HOSPADM

## 2024-04-03 RX ORDER — BUPROPION HYDROCHLORIDE 150 MG/1
150 TABLET, EXTENDED RELEASE ORAL DAILY
Status: DISCONTINUED | OUTPATIENT
Start: 2024-04-04 | End: 2024-04-04

## 2024-04-03 RX ORDER — LEVETIRACETAM 500 MG/1
500 TABLET ORAL 2 TIMES DAILY
Qty: 60 TABLET | Refills: 3 | Status: SHIPPED | OUTPATIENT
Start: 2024-04-03

## 2024-04-03 RX ADMIN — TICAGRELOR 90 MG: 90 TABLET ORAL at 08:37

## 2024-04-03 RX ADMIN — ACETAMINOPHEN 325MG 650 MG: 325 TABLET ORAL at 05:15

## 2024-04-03 RX ADMIN — ATORVASTATIN CALCIUM 80 MG: 80 TABLET, FILM COATED ORAL at 22:32

## 2024-04-03 RX ADMIN — PANTOPRAZOLE SODIUM 40 MG: 40 TABLET, DELAYED RELEASE ORAL at 05:15

## 2024-04-03 RX ADMIN — MIDODRINE HYDROCHLORIDE 5 MG: 5 TABLET ORAL at 18:07

## 2024-04-03 RX ADMIN — CARVEDILOL 12.5 MG: 12.5 TABLET, FILM COATED ORAL at 08:37

## 2024-04-03 RX ADMIN — SODIUM CHLORIDE 250 ML: 9 INJECTION, SOLUTION INTRAVENOUS at 18:15

## 2024-04-03 RX ADMIN — LEVETIRACETAM 500 MG: 100 INJECTION, SOLUTION INTRAVENOUS at 15:59

## 2024-04-03 RX ADMIN — IRON SUCROSE 100 MG: 20 INJECTION, SOLUTION INTRAVENOUS at 15:59

## 2024-04-03 RX ADMIN — MONTELUKAST 10 MG: 10 TABLET, FILM COATED ORAL at 08:37

## 2024-04-03 RX ADMIN — NIFEDIPINE 30 MG: 30 TABLET, EXTENDED RELEASE ORAL at 08:37

## 2024-04-03 RX ADMIN — RANOLAZINE 1000 MG: 500 TABLET, FILM COATED, EXTENDED RELEASE ORAL at 22:32

## 2024-04-03 RX ADMIN — TICAGRELOR 90 MG: 90 TABLET ORAL at 22:32

## 2024-04-03 RX ADMIN — ASPIRIN 81 MG 81 MG: 81 TABLET ORAL at 08:38

## 2024-04-03 RX ADMIN — AMITRIPTYLINE HYDROCHLORIDE 40 MG: 10 TABLET, FILM COATED ORAL at 22:31

## 2024-04-03 RX ADMIN — SODIUM CHLORIDE, PRESERVATIVE FREE 10 ML: 5 INJECTION INTRAVENOUS at 22:34

## 2024-04-03 RX ADMIN — ONDANSETRON 4 MG: 2 INJECTION INTRAMUSCULAR; INTRAVENOUS at 04:13

## 2024-04-03 RX ADMIN — POTASSIUM CHLORIDE 10 MEQ: 750 TABLET, EXTENDED RELEASE ORAL at 11:59

## 2024-04-03 RX ADMIN — BUPROPION HYDROCHLORIDE 150 MG: 150 TABLET, EXTENDED RELEASE ORAL at 08:38

## 2024-04-03 RX ADMIN — AZITHROMYCIN 250 MG: 250 TABLET, FILM COATED ORAL at 08:37

## 2024-04-03 RX ADMIN — DOCUSATE SODIUM 100 MG: 100 CAPSULE, LIQUID FILLED ORAL at 08:37

## 2024-04-03 RX ADMIN — Medication 12.5 MG: at 09:49

## 2024-04-03 RX ADMIN — SUMATRIPTAN SUCCINATE 25 MG: 25 TABLET ORAL at 00:49

## 2024-04-03 RX ADMIN — LEVETIRACETAM 500 MG: 100 INJECTION, SOLUTION INTRAVENOUS at 00:01

## 2024-04-03 RX ADMIN — SODIUM CHLORIDE, PRESERVATIVE FREE 10 ML: 5 INJECTION INTRAVENOUS at 08:38

## 2024-04-03 RX ADMIN — RANOLAZINE 1000 MG: 500 TABLET, FILM COATED, EXTENDED RELEASE ORAL at 08:37

## 2024-04-03 RX ADMIN — QUETIAPINE FUMARATE 50 MG: 25 TABLET ORAL at 22:32

## 2024-04-03 ASSESSMENT — PAIN DESCRIPTION - DESCRIPTORS: DESCRIPTORS: ACHING

## 2024-04-03 ASSESSMENT — PAIN - FUNCTIONAL ASSESSMENT: PAIN_FUNCTIONAL_ASSESSMENT: ACTIVITIES ARE NOT PREVENTED

## 2024-04-03 ASSESSMENT — PAIN DESCRIPTION - ONSET: ONSET: ON-GOING

## 2024-04-03 ASSESSMENT — PAIN SCALES - GENERAL
PAINLEVEL_OUTOF10: 9
PAINLEVEL_OUTOF10: 5

## 2024-04-03 ASSESSMENT — PAIN DESCRIPTION - PAIN TYPE: TYPE: ACUTE PAIN

## 2024-04-03 ASSESSMENT — PAIN DESCRIPTION - ORIENTATION: ORIENTATION: INNER

## 2024-04-03 ASSESSMENT — PAIN DESCRIPTION - LOCATION: LOCATION: HEAD

## 2024-04-03 ASSESSMENT — PAIN DESCRIPTION - FREQUENCY: FREQUENCY: INTERMITTENT

## 2024-04-03 NOTE — PROGRESS NOTES
This RN called rapid response due to patient becoming unresponsive while on toilet. Dr. Carney responded to rapid and ordered a 12 lead EKG and CXR. Notified primary hospitalist, Dr. Burger, of rapid response. Patient requested no family be notified of rapid response.    Electronically signed by FENG TREJO RN on 4/3/2024 at 12:31 PM

## 2024-04-03 NOTE — PLAN OF CARE
Problem: Discharge Planning  Goal: Discharge to home or other facility with appropriate resources  4/2/2024 2325 by Martin Pederson RN  Outcome: Progressing  Flowsheets (Taken 4/2/2024 2100)  Discharge to home or other facility with appropriate resources: Identify barriers to discharge with patient and caregiver     Problem: Pain  Goal: Verbalizes/displays adequate comfort level or baseline comfort level  4/2/2024 2325 by Martin Pederson RN  Outcome: Progressing     Problem: Skin/Tissue Integrity  Goal: Absence of new skin breakdown  Description: 1.  Monitor for areas of redness and/or skin breakdown  2.  Assess vascular access sites hourly  3.  Every 4-6 hours minimum:  Change oxygen saturation probe site  4.  Every 4-6 hours:  If on nasal continuous positive airway pressure, respiratory therapy assess nares and determine need for appliance change or resting period.  4/2/2024 2325 by Martin Pederson RN  Outcome: Progressing     Problem: ABCDS Injury Assessment  Goal: Absence of physical injury  4/2/2024 2325 by Martin Pederson RN  Outcome: Progressing     Problem: Respiratory - Adult  Goal: Achieves optimal ventilation and oxygenation  4/2/2024 2325 by Martin Pederson RN  Outcome: Progressing  Flowsheets (Taken 4/2/2024 2100)  Achieves optimal ventilation and oxygenation: Assess for changes in respiratory status     Problem: Cardiovascular - Adult  Goal: Absence of cardiac dysrhythmias or at baseline  4/2/2024 2325 by Martin Pederson RN  Outcome: Progressing  Flowsheets (Taken 4/2/2024 2100)  Absence of cardiac dysrhythmias or at baseline: Monitor cardiac rate and rhythm

## 2024-04-03 NOTE — PROGRESS NOTES
Slept well, easily aroused for routine checks. C/o headache throughout night shift. 1x dose excedrin provided minimal relief. 1x dose of imitrex provided moderate relief. Also c/o nausea 1x, PRN zofran provided relief. Up to bathroom with walker and stand by assist, tolerated well. 600cc total clear mariana urine throughout night shift. In bed, call light in reach.

## 2024-04-03 NOTE — DISCHARGE INSTR - COC
4/3/2024 1049  Last data filed at 4/3/2024 0912  Gross per 24 hour   Intake 1130 ml   Output 1000 ml   Net 130 ml     I/O last 3 completed shifts:  In: 1130 [P.O.:1080; IV Piggyback:50]  Out: 1300 [Urine:1300]    Safety Concerns:     History of Seizures    Impairments/Disabilities:      None    Nutrition Therapy:  Current Nutrition Therapy:   - Oral Diet:  Carb Control 4 carbs/meal (1800kcals/day) and Easy to Chew    Routes of Feeding: Oral  Liquids: Thin Liquids  Daily Fluid Restriction: no  Last Modified Barium Swallow with Video (Video Swallowing Test): not done    Treatments at the Time of Hospital Discharge:   Respiratory Treatments: See med list  Oxygen Therapy:  is not on home oxygen therapy.  Ventilator:    - No ventilator support    Rehab Therapies:   Weight Bearing Status/Restrictions: No weight bearing restrictions  Other Medical Equipment (for information only, NOT a DME order):  walker  Other Treatments:     Patient's personal belongings (please select all that are sent with patient):  Glasses    RN SIGNATURE:  Electronically signed by Hilda Lazo RN on 4/5/24 at 2:48 PM EDT    CASE MANAGEMENT/SOCIAL WORK SECTION    Inpatient Status Date: ***    Readmission Risk Assessment Score:  Readmission Risk              Risk of Unplanned Readmission:  29           Discharging to Facility/ Agency   Name:   Address:  Phone:  Fax:    Dialysis Facility (if applicable)   Name:  Address:  Dialysis Schedule:  Phone:  Fax:    / signature: {Esignature:[id number]}    PHYSICIAN SECTION    Prognosis: Fair    Condition at Discharge: Stable    Rehab Potential (if transferring to Rehab): Fair    Recommended Labs or Other Treatments After Discharge: NA    Physician Certification: I certify the above information and transfer of Maren Meza  is necessary for the continuing treatment of the diagnosis listed and that she requires Home Care for less 30 days.     Update Admission H&P: No change in  H&P    PHYSICIAN SIGNATURE:  Electronically signed by Eneida Burger MD on 4/3/24 at 10:49 AM EDT

## 2024-04-03 NOTE — SIGNIFICANT EVENT
Rapid response called reported immediately to bedside apparently patient was in the restroom had short episode of losing consciousness back to bed  Awake alert oriented following commands  Lungs clear to auscultation  S1-S2 regular  Abdomen soft  Moving all extremities spontaneously following commands  Appears vasovagal 3 syncope versus syncope, EKG, portable chest x-ray primary MD to follow-up    
Rapid response called, reported to bedside apparently patient was lethargic encephalopathic drooling not following commands, code stroke called  Vitals within normal limits  Lungs clear  S1-S2 regular  Abdomen soft  Initially not following commands with time started to follow more commands  Some generalized weakness then when more awake alert was able to follow commands  CT head stat ordered  Called and discussed with stroke team over   Patient already on aspirin  Had a cath yesterday  Acute encephalopathy/lethargy with involuntary movement followed by lethargy likely seizure on exam could not exclude acute stroke as etiology  Worsening kidney function with acute kidney injury  Diabetes mellitus sliding scale  Chest pain resolving  Labs noted from this morning with increased creatinine  MRI/MRA without contrast ordered based on discussion with stroke team came back negative  CT scan reviewed by myself negative for bleeding  Consulted neurology  Primary hospitalist will follow    Total critical care time including responding to code, ordering and following a critical imaging, discussion with other subspecialty in a patient with potential life-threatening complications and not including any procedures 32 minutes    
(M6) obeys commands

## 2024-04-03 NOTE — PROGRESS NOTES
Hospitalist Progress Note      PCP: No primary care provider on file.    Date of Admission: 3/26/2024    Subjective: pt had a syncopal episode this am while in the bathroom    Medications:  Reviewed    Infusion Medications    sodium chloride      dextrose       Scheduled Medications    [START ON 4/4/2024] buPROPion  150 mg Oral Daily    [START ON 4/4/2024] insulin glargine  8 Units SubCUTAneous QAM    midodrine  5 mg Oral TID WC    [START ON 4/4/2024] carvedilol  3.125 mg Oral BID WC    NIFEdipine  60 mg Oral BID    levETIRAcetam  500 mg IntraVENous Q12H    potassium chloride  10 mEq Oral Daily    ticagrelor  90 mg Oral BID    insulin lispro  0-16 Units SubCUTAneous 4x Daily WC    sodium chloride flush  5-40 mL IntraVENous 2 times per day    azithromycin  250 mg Oral Daily    docusate sodium  100 mg Oral Daily    ranolazine  1,000 mg Oral BID    pantoprazole  40 mg Oral QAM AC    montelukast  10 mg Oral Daily    linaclotide  290 mcg Oral QAM AC    mometasone-formoterol  2 puff Inhalation BID RT    atorvastatin  80 mg Oral Nightly    aspirin  81 mg Oral Daily    amitriptyline  40 mg Oral Nightly     PRN Meds: promethazine, sodium chloride flush, sodium chloride, ondansetron **OR** ondansetron, polyethylene glycol, QUEtiapine, glucose, dextrose bolus **OR** dextrose bolus, glucagon (rDNA), dextrose, acetaminophen **OR** acetaminophen, albuterol, labetalol, morphine, nitroGLYCERIN, ipratropium 0.5 mg-albuterol 2.5 mg      Intake/Output Summary (Last 24 hours) at 4/3/2024 1659  Last data filed at 4/3/2024 0912  Gross per 24 hour   Intake 650 ml   Output 1000 ml   Net -350 ml       Physical Exam Performed:    BP (!) 154/85   Pulse 79   Temp 98.1 °F (36.7 °C) (Oral)   Resp 18   Ht 1.524 m (5')   Wt 52.8 kg (116 lb 6.5 oz)   SpO2 100%   BMI 22.73 kg/m²     General: NAD  Eyes: EOMI  ENT: neck supple  Cardiovascular: Regular rate.  Respiratory: Clear to auscultation  Gastrointestinal: Soft, non  tender  Genitourinary: no suprapubic tenderness  Musculoskeletal: No edema  Skin: warm, dry  Neuro: Alert,moves all four extremities .  Psych: Mood appropriate.     Labs:   Recent Labs     04/01/24  0748 04/02/24  0415 04/03/24  0702   WBC 10.0 7.7 6.8   HGB 9.3* 9.0* 9.3*   HCT 27.9* 26.7* 27.9*    297 306     Recent Labs     04/01/24  0748 04/02/24  0415 04/03/24  0702    137 137   K 3.7 3.5  3.5 3.6    105 102   CO2 21 20* 23   BUN 22* 20 19   CREATININE 1.4* 1.7* 1.6*   CALCIUM 9.3 8.8 8.8   PHOS 3.1 3.1 3.3     No results for input(s): \"AST\", \"ALT\", \"BILIDIR\", \"BILITOT\", \"ALKPHOS\" in the last 72 hours.  No results for input(s): \"INR\" in the last 72 hours.  No results for input(s): \"CKTOTAL\", \"TROPHS\" in the last 72 hours.    Urinalysis:      Lab Results   Component Value Date/Time    NITRU Negative 02/20/2023 10:57 AM    WBCUA 0 02/20/2023 10:57 AM    BACTERIA None Seen 02/20/2023 10:57 AM    RBCUA 0 02/20/2023 10:57 AM    BLOODU Negative 02/20/2023 10:57 AM    SPECGRAV 1.015 02/20/2023 10:57 AM    GLUCOSEU Negative 02/20/2023 10:57 AM    GLUCOSEU NEGATIVE 05/14/2012 03:29 PM       Radiology:  XR CHEST PORTABLE   Preliminary Result   Mild perihilar congestion but no pulmonary edema.         MRI BRAIN W CONTRAST   Final Result   Stable 7 x 5 mm dural-based, enhancing lesion overlying the left superior   temporal gyrus, consistent with a meningioma.         MRA NECK WO CONTRAST   Final Result   Addendum (preliminary) 1 of 1   ADDENDUM:   There is suggestion of a 7 mm extra-axial mass along the left temporal   convexity, which appears grossly unchanged dating back to an MRI from   04/21/2015.  This is only partially visualized on this noncontrast MRI.     No   adjacent parenchymal edema.  The outside prior postcontrast MRI was not   available for comparison at the time of dictation.         Final   1. No acute intracranial abnormality.  No acute infarct.   2. Mild global parenchymal volume loss

## 2024-04-03 NOTE — PROGRESS NOTES
- Pulmonology following     COPD     Hypertension              - BP trending down   - Continue carvedilol and nifedipine ( increased to 30 mg BID on 4/1)    Anemia   - Hgb 9.0 g/dL              - Iron sat 14 -- receiving venofer      CKD-MBD              - Monitor    AMS   - seizure with post-ictal state  - currently much better    seizure    - MRI head (4/2/2024): unchanged 7 x 5 mm dural-based legion of the L superior temporal gyrus  - neurology consulted  - keppra started    DM2    CYRUS Fowler - CNP    Will discuss with nephrology attending physician, Dr. Patel.  See attestation for additional recommendations.

## 2024-04-03 NOTE — PROGRESS NOTES
Patient stated she hasn't had a bowel movement in over a week, which is baseline for her. Patient refusing PRN Miralax. Scheduled stool softener given.    Electronically signed by FENG TREJO RN on 4/3/2024 at 10:41 AM

## 2024-04-04 ENCOUNTER — TELEPHONE (OUTPATIENT)
Dept: PRIMARY CARE CLINIC | Age: 69
End: 2024-04-04

## 2024-04-04 LAB
ALBUMIN SERPL-MCNC: 3.1 G/DL (ref 3.4–5)
ANION GAP SERPL CALCULATED.3IONS-SCNC: 14 MMOL/L (ref 3–16)
ANISOCYTOSIS BLD QL SMEAR: ABNORMAL
BASOPHILS # BLD: 0 K/UL (ref 0–0.2)
BASOPHILS NFR BLD: 0.2 %
BUN SERPL-MCNC: 19 MG/DL (ref 7–20)
CALCIUM SERPL-MCNC: 8.7 MG/DL (ref 8.3–10.6)
CHLORIDE SERPL-SCNC: 105 MMOL/L (ref 99–110)
CO2 SERPL-SCNC: 18 MMOL/L (ref 21–32)
CREAT SERPL-MCNC: 1.6 MG/DL (ref 0.6–1.2)
DEPRECATED RDW RBC AUTO: 16 % (ref 12.4–15.4)
EOSINOPHIL # BLD: 0.2 K/UL (ref 0–0.6)
EOSINOPHIL NFR BLD: 1.9 %
GFR SERPLBLD CREATININE-BSD FMLA CKD-EPI: 35 ML/MIN/{1.73_M2}
GLUCOSE BLD-MCNC: 105 MG/DL (ref 70–99)
GLUCOSE BLD-MCNC: 113 MG/DL (ref 70–99)
GLUCOSE BLD-MCNC: 57 MG/DL (ref 70–99)
GLUCOSE BLD-MCNC: 73 MG/DL (ref 70–99)
GLUCOSE BLD-MCNC: 82 MG/DL (ref 70–99)
GLUCOSE BLD-MCNC: 90 MG/DL (ref 70–99)
GLUCOSE SERPL-MCNC: 81 MG/DL (ref 70–99)
HCT VFR BLD AUTO: 27.8 % (ref 36–48)
HGB BLD-MCNC: 9.3 G/DL (ref 12–16)
HYPOCHROMIA BLD QL SMEAR: ABNORMAL
LYMPHOCYTES # BLD: 1 K/UL (ref 1–5.1)
LYMPHOCYTES NFR BLD: 11.4 %
MCH RBC QN AUTO: 30.9 PG (ref 26–34)
MCHC RBC AUTO-ENTMCNC: 33.6 G/DL (ref 31–36)
MCV RBC AUTO: 92 FL (ref 80–100)
MONOCYTES # BLD: 0.5 K/UL (ref 0–1.3)
MONOCYTES NFR BLD: 6 %
NEUTROPHILS # BLD: 7.2 K/UL (ref 1.7–7.7)
NEUTROPHILS NFR BLD: 80.5 %
PERFORMED ON: ABNORMAL
PERFORMED ON: NORMAL
PHOSPHATE SERPL-MCNC: 3 MG/DL (ref 2.5–4.9)
PLATELET # BLD AUTO: 259 K/UL (ref 135–450)
PLATELET BLD QL SMEAR: ADEQUATE
PMV BLD AUTO: 7.4 FL (ref 5–10.5)
POTASSIUM SERPL-SCNC: 4.1 MMOL/L (ref 3.5–5.1)
RBC # BLD AUTO: 3.02 M/UL (ref 4–5.2)
SLIDE REVIEW: ABNORMAL
SODIUM SERPL-SCNC: 137 MMOL/L (ref 136–145)
WBC # BLD AUTO: 9 K/UL (ref 4–11)

## 2024-04-04 PROCEDURE — 2060000000 HC ICU INTERMEDIATE R&B

## 2024-04-04 PROCEDURE — 6370000000 HC RX 637 (ALT 250 FOR IP): Performed by: INTERNAL MEDICINE

## 2024-04-04 PROCEDURE — 94760 N-INVAS EAR/PLS OXIMETRY 1: CPT

## 2024-04-04 PROCEDURE — 97535 SELF CARE MNGMENT TRAINING: CPT

## 2024-04-04 PROCEDURE — 85025 COMPLETE CBC W/AUTO DIFF WBC: CPT

## 2024-04-04 PROCEDURE — 6370000000 HC RX 637 (ALT 250 FOR IP): Performed by: REGISTERED NURSE

## 2024-04-04 PROCEDURE — 36415 COLL VENOUS BLD VENIPUNCTURE: CPT

## 2024-04-04 PROCEDURE — 2580000003 HC RX 258: Performed by: INTERNAL MEDICINE

## 2024-04-04 PROCEDURE — 80069 RENAL FUNCTION PANEL: CPT

## 2024-04-04 PROCEDURE — 6360000002 HC RX W HCPCS: Performed by: INTERNAL MEDICINE

## 2024-04-04 PROCEDURE — 93325 DOPPLER ECHO COLOR FLOW MAPG: CPT

## 2024-04-04 PROCEDURE — 93308 TTE F-UP OR LMTD: CPT

## 2024-04-04 PROCEDURE — 9990000010 HC NO CHARGE VISIT: Performed by: PHYSICAL THERAPIST

## 2024-04-04 PROCEDURE — 97530 THERAPEUTIC ACTIVITIES: CPT

## 2024-04-04 PROCEDURE — 99222 1ST HOSP IP/OBS MODERATE 55: CPT | Performed by: REGISTERED NURSE

## 2024-04-04 RX ORDER — LEVETIRACETAM 500 MG/1
500 TABLET ORAL 2 TIMES DAILY
Status: DISCONTINUED | OUTPATIENT
Start: 2024-04-04 | End: 2024-04-05 | Stop reason: HOSPADM

## 2024-04-04 RX ORDER — NIFEDIPINE 30 MG/1
30 TABLET, EXTENDED RELEASE ORAL 2 TIMES DAILY
Status: DISCONTINUED | OUTPATIENT
Start: 2024-04-04 | End: 2024-04-04

## 2024-04-04 RX ORDER — NIFEDIPINE 30 MG/1
60 TABLET, EXTENDED RELEASE ORAL 2 TIMES DAILY
Status: DISCONTINUED | OUTPATIENT
Start: 2024-04-04 | End: 2024-04-05 | Stop reason: HOSPADM

## 2024-04-04 RX ORDER — DULOXETIN HYDROCHLORIDE 20 MG/1
20 CAPSULE, DELAYED RELEASE ORAL DAILY
Status: DISCONTINUED | OUTPATIENT
Start: 2024-04-04 | End: 2024-04-05 | Stop reason: HOSPADM

## 2024-04-04 RX ORDER — DULOXETIN HYDROCHLORIDE 20 MG/1
20 CAPSULE, DELAYED RELEASE ORAL DAILY
Qty: 30 CAPSULE | Refills: 3 | Status: SHIPPED | OUTPATIENT
Start: 2024-04-05

## 2024-04-04 RX ORDER — NIFEDIPINE 30 MG/1
60 TABLET, EXTENDED RELEASE ORAL 2 TIMES DAILY
Qty: 120 TABLET | Refills: 3 | Status: SHIPPED | OUTPATIENT
Start: 2024-04-04

## 2024-04-04 RX ORDER — 0.9 % SODIUM CHLORIDE 0.9 %
250 INTRAVENOUS SOLUTION INTRAVENOUS ONCE
Status: COMPLETED | OUTPATIENT
Start: 2024-04-04 | End: 2024-04-04

## 2024-04-04 RX ADMIN — DOCUSATE SODIUM 100 MG: 100 CAPSULE, LIQUID FILLED ORAL at 09:05

## 2024-04-04 RX ADMIN — MONTELUKAST 10 MG: 10 TABLET, FILM COATED ORAL at 09:04

## 2024-04-04 RX ADMIN — BUPROPION HYDROCHLORIDE 150 MG: 150 TABLET, FILM COATED, EXTENDED RELEASE ORAL at 09:05

## 2024-04-04 RX ADMIN — SODIUM CHLORIDE 250 ML: 9 INJECTION, SOLUTION INTRAVENOUS at 14:28

## 2024-04-04 RX ADMIN — NIFEDIPINE 60 MG: 30 TABLET, EXTENDED RELEASE ORAL at 21:23

## 2024-04-04 RX ADMIN — SODIUM CHLORIDE, PRESERVATIVE FREE 10 ML: 5 INJECTION INTRAVENOUS at 21:23

## 2024-04-04 RX ADMIN — AZITHROMYCIN 250 MG: 250 TABLET, FILM COATED ORAL at 09:04

## 2024-04-04 RX ADMIN — TICAGRELOR 90 MG: 90 TABLET ORAL at 21:22

## 2024-04-04 RX ADMIN — RANOLAZINE 1000 MG: 500 TABLET, FILM COATED, EXTENDED RELEASE ORAL at 09:05

## 2024-04-04 RX ADMIN — LEVETIRACETAM 500 MG: 100 INJECTION, SOLUTION INTRAVENOUS at 01:14

## 2024-04-04 RX ADMIN — NIFEDIPINE 60 MG: 30 TABLET, EXTENDED RELEASE ORAL at 09:04

## 2024-04-04 RX ADMIN — MIDODRINE HYDROCHLORIDE 5 MG: 5 TABLET ORAL at 09:04

## 2024-04-04 RX ADMIN — ONDANSETRON 4 MG: 2 INJECTION INTRAMUSCULAR; INTRAVENOUS at 08:19

## 2024-04-04 RX ADMIN — RANOLAZINE 1000 MG: 500 TABLET, FILM COATED, EXTENDED RELEASE ORAL at 21:22

## 2024-04-04 RX ADMIN — POTASSIUM CHLORIDE 10 MEQ: 750 TABLET, EXTENDED RELEASE ORAL at 09:04

## 2024-04-04 RX ADMIN — TICAGRELOR 90 MG: 90 TABLET ORAL at 09:05

## 2024-04-04 RX ADMIN — LEVETIRACETAM 500 MG: 100 INJECTION, SOLUTION INTRAVENOUS at 13:41

## 2024-04-04 RX ADMIN — QUETIAPINE FUMARATE 50 MG: 25 TABLET ORAL at 21:28

## 2024-04-04 RX ADMIN — CARVEDILOL 3.12 MG: 3.12 TABLET, FILM COATED ORAL at 16:59

## 2024-04-04 RX ADMIN — PANTOPRAZOLE SODIUM 40 MG: 40 TABLET, DELAYED RELEASE ORAL at 05:14

## 2024-04-04 RX ADMIN — ASPIRIN 81 MG 81 MG: 81 TABLET ORAL at 09:05

## 2024-04-04 RX ADMIN — ATORVASTATIN CALCIUM 80 MG: 80 TABLET, FILM COATED ORAL at 21:22

## 2024-04-04 RX ADMIN — Medication 12.5 MG: at 11:47

## 2024-04-04 RX ADMIN — LEVETIRACETAM 500 MG: 500 TABLET, FILM COATED ORAL at 23:13

## 2024-04-04 RX ADMIN — CARVEDILOL 3.12 MG: 3.12 TABLET, FILM COATED ORAL at 09:04

## 2024-04-04 RX ADMIN — AMITRIPTYLINE HYDROCHLORIDE 40 MG: 10 TABLET, FILM COATED ORAL at 21:22

## 2024-04-04 RX ADMIN — INSULIN GLARGINE 8 UNITS: 100 INJECTION, SOLUTION SUBCUTANEOUS at 09:05

## 2024-04-04 RX ADMIN — DULOXETINE HYDROCHLORIDE 20 MG: 20 CAPSULE, DELAYED RELEASE ORAL at 16:59

## 2024-04-04 RX ADMIN — SODIUM CHLORIDE, PRESERVATIVE FREE 10 ML: 5 INJECTION INTRAVENOUS at 09:05

## 2024-04-04 ASSESSMENT — PAIN SCALES - GENERAL
PAINLEVEL_OUTOF10: 0
PAINLEVEL_OUTOF10: 0

## 2024-04-04 NOTE — PLAN OF CARE
Cardiovascular - Adult  Goal: Absence of cardiac dysrhythmias or at baseline  Outcome: Progressing  Flowsheets (Taken 4/3/2024 2045)  Absence of cardiac dysrhythmias or at baseline: Monitor cardiac rate and rhythm     Problem: Safety - Medical Restraint  Goal: Remains free of injury from restraints (Restraint for Interference with Medical Device)  Description: INTERVENTIONS:  1. Determine that other, less restrictive measures have been tried or would not be effective before applying the restraint  2. Evaluate the patient's condition at the time of restraint application  3. Inform patient/family regarding the reason for restraint  4. Q2H: Monitor safety, psychosocial status, comfort, nutrition and hydration  Outcome: Progressing

## 2024-04-04 NOTE — PROGRESS NOTES
Patient has not had bm since 3/28/24. BS active x 4 quadrants. Offered miralax and patient declined stating she'd rather wait until she got home to have a bm.  Educated patient on importance of not holding in bm or bearing down when having bm d/t risk of vasovagal syncope.  Voiced understanding but continued to decline miralax.    Electronically signed by Hilda Lazo RN on 4/4/2024 at 12:00 PM

## 2024-04-04 NOTE — CONSULTS
This is a 68 y.o female with PMH HTN, Afib, neuropathy, HLD, CAD, COPD, Fibromyalgia, DM, CVA, anxiety, depression, arthritis, anaemia  presented to ED chest pain, and SOB on 3/26/24. She under went [facility] on 3/27/24 and recommended statin therapy. During this admission she experienced witnessed seizure (generalized tonic-clonic  activity). Patient has hx anxiety and depression and takes Buspar mg BID, Wellbutrin  mg BID, Seroquel 25 mg nightly, Atarax 25 mg TID PRN, and Elavil 10 mg nightly. Her psychotropic medications have been managed by PCP. She reports her sleep has been good with Seroquel.  Neurology put patient on Elavil for migraine HA. She was on Cymbalta 60 mg BID in the past does not why she was taken off. She has CKD stage 3. Psychiatry consulted for medication recommendation. Patient denies SI/HI/AVH.   Recommendations   D/c  Wellbutrin  mg BID due to seizure activity while on this medication.   Restart Cymbalta 20 mg daily.  She has chronic pain. PCP can increase dose to BID if renal stable.   Reviewed labs, progress and consult notes.    Will sign off at this time. Please call psychiatry consult line with any questions.

## 2024-04-04 NOTE — PLAN OF CARE
Problem: Discharge Planning  Goal: Discharge to home or other facility with appropriate resources  4/4/2024 1111 by Hilda Lazo RN  Outcome: Progressing     Problem: Pain  Goal: Verbalizes/displays adequate comfort level or baseline comfort level  4/4/2024 1111 by Hilda Lazo RN  Outcome: Progressing     Problem: Skin/Tissue Integrity  Goal: Absence of new skin breakdown  Description: 1.  Monitor for areas of redness and/or skin breakdown  2.  Assess vascular access sites hourly  3.  Every 4-6 hours minimum:  Change oxygen saturation probe site  4.  Every 4-6 hours:  If on nasal continuous positive airway pressure, respiratory therapy assess nares and determine need for appliance change or resting period.  4/4/2024 1111 by Hilda Lazo RN  Outcome: Progressing     Problem: ABCDS Injury Assessment  Goal: Absence of physical injury  4/4/2024 1111 by Hilda Lazo RN  Outcome: Progressing     Problem: Safety - Adult  Goal: Free from fall injury  4/4/2024 1111 by Hilda Lazo RN  Outcome: Progressing     Problem: Chronic Conditions and Co-morbidities  Goal: Patient's chronic conditions and co-morbidity symptoms are monitored and maintained or improved  4/4/2024 1111 by Hilda Lazo RN  Outcome: Progressing     Problem: Respiratory - Adult  Goal: Achieves optimal ventilation and oxygenation  4/4/2024 1111 by Hilda Lazo RN  Outcome: Progressing     Problem: Cardiovascular - Adult  Goal: Maintains optimal cardiac output and hemodynamic stability  4/4/2024 1111 by Hilda Lazo RN  Outcome: Progressing     Problem: Cardiovascular - Adult  Goal: Absence of cardiac dysrhythmias or at baseline  4/4/2024 1111 by Hilda Lazo RN  Outcome: Progressing     Problem: Safety - Medical Restraint  Goal: Remains free of injury from restraints (Restraint for Interference with Medical Device)  Description: INTERVENTIONS:  1. Determine that other, less restrictive measures

## 2024-04-04 NOTE — TELEPHONE ENCOUNTER
Leila with [facility] called to receive a verbal order to recertify PT's home care.     PT last seen by [facility] in 2023.    Best call back number for Leila: [phone]

## 2024-04-04 NOTE — PROGRESS NOTES
Occupational Therapy  Facility/Department: [address] PROGRESSIVE CARE  Occupational Therapy Daily Treatment Note    Name: Maren Meza  : 1955  MRN: [id number]  Date of Service: 2024    Discharge Recommendations:  Home with Home health OT, 2-3 sessions per week, 24 hour supervision or assist     Maren Meza scored a 19/24 on the AM-PAC ADL Inpatient form. Current research shows that an AM-PAC score of 18 or greater is typically associated with a discharge to the patient's home setting. Based on the patient's AM-PAC score, and their current ADL deficits, it is recommended that the patient have 2-3 sessions per week of Occupational Therapy at d/c to increase the patient's independence.  At this time, this patient demonstrates the endurance and safety to discharge home with home OT (home vs OP services) and a follow up treatment frequency of 2-3x/wk.   Please see assessment section for further patient specific details.    If patient discharges prior to next session this note will serve as a discharge summary.  Please see below for the latest assessment towards goals.       Patient Diagnosis(es): The primary encounter diagnosis was Chest pain, unspecified type. Diagnoses of Elevated troponin, Acute kidney injury superimposed on chronic kidney disease ([facility]), and Type 2 diabetes mellitus with other circulatory complication, with long-term current use of insulin ([facility]) were also pertinent to this visit.  Past Medical History:  has a past medical history of Acid reflux, Anemia, Anxiety and depression, Arthritis, Asthma, Atrial fibrillation ([facility]), CAD (coronary artery disease), Cerebral artery occlusion with cerebral infarction ([facility]), CHF (congestive heart failure) ([facility]), Chronic kidney disease--stage III, COPD (chronic obstructive pulmonary disease) ([facility]), DM2 (diabetes mellitus, type 2) ([facility]), Dysarthria, Fibromyalgia, Headache(784.0), Hemisensory loss, History of blood transfusion, Hyperlipidemia,

## 2024-04-04 NOTE — PROGRESS NOTES
Hospitalist Progress Note      PCP: No primary care provider on file.    Date of Admission: 3/26/2024    Subjective: no more syncope episodes, feels better today    Medications:  Reviewed    Infusion Medications    sodium chloride      dextrose       Scheduled Medications    DULoxetine  20 mg Oral Daily    NIFEdipine  30 mg Oral BID    insulin glargine  8 Units SubCUTAneous QAM    carvedilol  3.125 mg Oral BID WC    levETIRAcetam  500 mg IntraVENous Q12H    potassium chloride  10 mEq Oral Daily    ticagrelor  90 mg Oral BID    insulin lispro  0-16 Units SubCUTAneous 4x Daily WC    sodium chloride flush  5-40 mL IntraVENous 2 times per day    azithromycin  250 mg Oral Daily    docusate sodium  100 mg Oral Daily    ranolazine  1,000 mg Oral BID    pantoprazole  40 mg Oral QAM AC    montelukast  10 mg Oral Daily    linaclotide  290 mcg Oral QAM AC    mometasone-formoterol  2 puff Inhalation BID RT    atorvastatin  80 mg Oral Nightly    aspirin  81 mg Oral Daily    amitriptyline  40 mg Oral Nightly     PRN Meds: perflutren lipid microspheres, perflutren lipid microspheres, promethazine, sodium chloride flush, sodium chloride, ondansetron **OR** ondansetron, polyethylene glycol, QUEtiapine, glucose, dextrose bolus **OR** dextrose bolus, glucagon (rDNA), dextrose, acetaminophen **OR** acetaminophen, albuterol, labetalol, morphine, nitroGLYCERIN, ipratropium 0.5 mg-albuterol 2.5 mg      Intake/Output Summary (Last 24 hours) at 4/4/2024 1716  Last data filed at 4/4/2024 1359  Gross per 24 hour   Intake 850.64 ml   Output 800 ml   Net 50.64 ml       Physical Exam Performed:    BP (!) 160/76   Pulse 74   Temp 98.3 °F (36.8 °C) (Oral)   Resp 16   Ht 1.524 m (5')   Wt 52.1 kg (114 lb 13.8 oz)   SpO2 98%   BMI 22.43 kg/m²     General: NAD  Eyes: EOMI  ENT: neck supple  Cardiovascular: Regular rate.  Respiratory: Clear to auscultation  Gastrointestinal: Soft, non tender  Genitourinary: no suprapubic    ischemic changes.   3. Chronic lacunar infarct within the right shahnaz.   4. Minimal cerebellar tonsillar ectopia.   5. No significant stenosis or large vessel occlusion of the [id number].   6. No convincing flow limiting stenosis of the visualized cervical   carotid/vertebral arteries.   7. Suggestion of less than 50% stenosis of the proximal right cervical ICA by   NASCET criteria.         MRA HEAD WO CONTRAST   Final Result   Addendum (preliminary) 1 of 1   ADDENDUM:   There is suggestion of a 7 mm extra-axial mass along the left temporal   convexity, which appears grossly unchanged dating back to an MRI from   04/21/2015.  This is only partially visualized on this noncontrast MRI.     No   adjacent parenchymal edema.  The outside prior postcontrast MRI was not   available for comparison at the time of dictation.         Final   1. No acute intracranial abnormality.  No acute infarct.   2. Mild global parenchymal volume loss with minimal chronic microvascular   ischemic changes.   3. Chronic lacunar infarct within the right shahnaz.   4. Minimal cerebellar tonsillar ectopia.   5. No significant stenosis or large vessel occlusion of the [id number]-[id number].   6. No convincing flow limiting stenosis of the visualized cervical   carotid/vertebral arteries.   7. Suggestion of less than 50% stenosis of the proximal right cervical ICA by   NASCET criteria.         MRI BRAIN WO CONTRAST   Final Result   Addendum (preliminary) 1 of 1   ADDENDUM:   There is suggestion of a 7 mm extra-axial mass along the left temporal   convexity, which appears grossly unchanged dating back to an MRI from   04/21/2015.  This is only partially visualized on this noncontrast MRI.     No   adjacent parenchymal edema.  The outside prior postcontrast MRI was not   available for comparison at the time of dictation.         Final   1. No acute intracranial abnormality.  No acute infarct.   2. Mild global parenchymal volume loss with minimal

## 2024-04-04 NOTE — PROGRESS NOTES
Md notified of orthostatic bps and gave orders to d/c midodrine and give 250 ml saline bolus.     Electronically signed by Hilad Lazo RN on 4/4/2024 at 2:23 PM

## 2024-04-04 NOTE — PROGRESS NOTES
Physical Therapy      Maren Meza  [id number]  [id number]    Attempted to see for PT session however pt declined; pt states she is going home tomorrow and her son is staying with her initially; pt reports she has been up walking today and is too tired.  Will continue to follow  Electronically signed by GEORGETTE GU, PT on 4/4/2024 at 3:14 PM

## 2024-04-04 NOTE — PROGRESS NOTES
Comprehensive Nutrition Assessment    Type and Reason for Visit:  Initial    Nutrition Recommendations/Plan:   Continue easy to chew 4 carb diet     Malnutrition Assessment:  Malnutrition Status:  No malnutrition (04/04/24 0975)    Context:  Chronic Illness       Nutrition Assessment:    Length of stay. Pt presenting w/ chest pain. No recent wt hx. Since admission pt reports that she has been eating however she is eager to go home and will ikely eat better there. Nutrition related labs reviewed. No nutrition concerns at this time. Continue current diet order    Nutrition Related Findings:    nutrition related labs reviewed, 3/28 BM Wound Type: None       Current Nutrition Intake & Therapies:    Average Meal Intake: 51-75%  Average Supplements Intake: None Ordered  ADULT DIET; Easy to Chew; 4 carb choices (60 gm/meal)    Anthropometric Measures:  Height: 152.4 cm (5')  Ideal Body Weight (IBW): 100 lbs (45 kg)       Current Body Weight: 52.1 kg (114 lb 13.8 oz),   IBW.    Current BMI (kg/m2): 22.4                               Estimated Daily Nutrient Needs:  Energy Requirements Based On: Kcal/kg  Weight Used for Energy Requirements: Current  Energy (kcal/day): 1303 - 1563 (25 - 30 kcal/kg)  Weight Used for Protein Requirements: Current  Protein (g/day): 52 - 63 (1-1.2 g/kg)  Method Used for Fluid Requirements: 1 ml/kcal  Fluid (ml/day): or per provider    Nutrition Diagnosis:   No nutrition diagnosis at this time    Nutrition Interventions:   Food and/or Nutrient Delivery: Continue Current Diet  Nutrition Education/Counseling: Education not indicated  Coordination of Nutrition Care: Continue to monitor while inpatient       Goals:     Goals: Meet at least 75% of estimated needs, by next RD assessment       Nutrition Monitoring and Evaluation:   Behavioral-Environmental Outcomes: None Identified  Food/Nutrient Intake Outcomes: Food and Nutrient Intake  Physical Signs/Symptoms Outcomes: Biochemical Data, Weight,  Nutrition Focused Physical Findings    Discharge Planning:    No discharge needs at this time     Rommel Staton RD  Contact: [phone]

## 2024-04-04 NOTE — PROGRESS NOTES
Nephrology ([facility]) Progress Note    CC: KOLBY on CKD3b    Subjective:    HPI:  Breathing comfortably.  No CP.  More awake. Renal function remains within baseline. BP better. Had syncopal episode yesterday. Orthostatics noted.   ROS:  Denies SOB  PMFSH:  medications reviewed.    Objective:  Blood pressure (!) 157/77, pulse 76, temperature 98.2 °F (36.8 °C), temperature source Oral, resp. rate 18, height 1.524 m (5'), weight 52.1 kg (114 lb 13.8 oz), SpO2 99 %, not currently breastfeeding.    Intake/Output Summary (Last 24 hours) at 4/4/2024 1409  Last data filed at 4/4/2024 1359  Gross per 24 hour   Intake 850.64 ml   Output 800 ml   Net 50.64 ml       General:  NAD, A+Ox3  Chest:  CTAB  CVS:  RRR  Abdominal:  NTND, soft, +BS  Extremities:  no edema  Skin:  no rash    Labs:  Renal panel:  Lab Results   Component Value Date/Time     04/04/2024 08:03 AM    K 4.1 04/04/2024 08:03 AM    K 3.5 04/02/2024 04:15 AM    CO2 18 (L) 04/04/2024 08:03 AM    BUN 19 04/04/2024 08:03 AM    CREATININE 1.6 (H) 04/04/2024 08:03 AM    CALCIUM 8.7 04/04/2024 08:03 AM    PHOS 3.0 04/04/2024 08:03 AM    MG 1.80 04/02/2024 04:15 AM     CBC:  Lab Results   Component Value Date/Time    WBC 9.0 04/04/2024 08:04 AM    HGB 9.3 (L) 04/04/2024 08:04 AM    HCT 27.8 (L) 04/04/2024 08:04 AM     04/04/2024 08:04 AM       Assessment/Plan:  Reviewed old records and labs.    KOLBY on CKD3b: baseline Cr ~ 1.5-1.8 mg/dL. Follows with Dr. Chung in office. Initially secondary LYNN then from volume depletion due to poor PO intake.            - Pk Cr 2.4 mg/dL. Cr 1.6 mg/dL this AM. Remains within baseline.               - Continue holding losartan               - Avoid hypotension. Avoid nephrotoxic agents when possible              - Daily RFTs    Hypertension  Orthostatic Hypotension              - Positive orthostatics and syncopal episode yesterday   - Will need to accept a higher BP to reduce risk of orthostatic

## 2024-04-04 NOTE — FLOWSHEET NOTE
04/04/24 1348   Vital Signs   Blood Pressure Lying 152/76   Pulse Lying 99 PER MINUTE   Blood Pressure Sitting 146/66   Pulse Sitting 88 PER MINUTE   Blood Pressure Standing 128/64   Pulse Standing 83 PER MINUTE      Orthostatic bp's.  Patient reports mild dizziness while standing.     Electronically signed by Hilda Lazo RN on 4/4/2024 at 1:53 PM

## 2024-04-05 VITALS
OXYGEN SATURATION: 100 % | HEIGHT: 60 IN | BODY MASS INDEX: 22.59 KG/M2 | RESPIRATION RATE: 16 BRPM | TEMPERATURE: 98.3 F | DIASTOLIC BLOOD PRESSURE: 63 MMHG | SYSTOLIC BLOOD PRESSURE: 117 MMHG | HEART RATE: 82 BPM | WEIGHT: 115.08 LBS

## 2024-04-05 LAB
ALBUMIN SERPL-MCNC: 3.1 G/DL (ref 3.4–5)
ANION GAP SERPL CALCULATED.3IONS-SCNC: 14 MMOL/L (ref 3–16)
BACTERIA URNS QL MICRO: ABNORMAL /HPF
BASOPHILS # BLD: 0 K/UL (ref 0–0.2)
BASOPHILS NFR BLD: 0.3 %
BILIRUB UR QL STRIP.AUTO: ABNORMAL
BUN SERPL-MCNC: 17 MG/DL (ref 7–20)
CALCIUM SERPL-MCNC: 8.8 MG/DL (ref 8.3–10.6)
CHLORIDE SERPL-SCNC: 104 MMOL/L (ref 99–110)
CLARITY UR: CLEAR
CO2 SERPL-SCNC: 19 MMOL/L (ref 21–32)
COLOR UR: ABNORMAL
CREAT SERPL-MCNC: 1.6 MG/DL (ref 0.6–1.2)
DEPRECATED RDW RBC AUTO: 16.3 % (ref 12.4–15.4)
EOSINOPHIL # BLD: 0.2 K/UL (ref 0–0.6)
EOSINOPHIL NFR BLD: 2.1 %
EPI CELLS #/AREA URNS AUTO: 4 /HPF (ref 0–5)
GFR SERPLBLD CREATININE-BSD FMLA CKD-EPI: 35 ML/MIN/{1.73_M2}
GLUCOSE BLD-MCNC: 121 MG/DL (ref 70–99)
GLUCOSE BLD-MCNC: 133 MG/DL (ref 70–99)
GLUCOSE BLD-MCNC: 70 MG/DL (ref 70–99)
GLUCOSE BLD-MCNC: 99 MG/DL (ref 70–99)
GLUCOSE SERPL-MCNC: 96 MG/DL (ref 70–99)
GLUCOSE UR STRIP.AUTO-MCNC: NEGATIVE MG/DL
HCT VFR BLD AUTO: 27.6 % (ref 36–48)
HGB BLD-MCNC: 9.1 G/DL (ref 12–16)
HGB UR QL STRIP.AUTO: NEGATIVE
HYALINE CASTS #/AREA URNS AUTO: 0 /LPF (ref 0–8)
KETONES UR STRIP.AUTO-MCNC: NEGATIVE MG/DL
LEUKOCYTE ESTERASE UR QL STRIP.AUTO: ABNORMAL
LYMPHOCYTES # BLD: 0.7 K/UL (ref 1–5.1)
LYMPHOCYTES NFR BLD: 9.7 %
MCH RBC QN AUTO: 30.7 PG (ref 26–34)
MCHC RBC AUTO-ENTMCNC: 33.2 G/DL (ref 31–36)
MCV RBC AUTO: 92.6 FL (ref 80–100)
MONOCYTES # BLD: 0.4 K/UL (ref 0–1.3)
MONOCYTES NFR BLD: 5.6 %
NEUTROPHILS # BLD: 6.3 K/UL (ref 1.7–7.7)
NEUTROPHILS NFR BLD: 82.3 %
NITRITE UR QL STRIP.AUTO: NEGATIVE
PERFORMED ON: ABNORMAL
PERFORMED ON: ABNORMAL
PERFORMED ON: NORMAL
PERFORMED ON: NORMAL
PH UR STRIP.AUTO: 5.5 [PH] (ref 5–8)
PHOSPHATE SERPL-MCNC: 2.6 MG/DL (ref 2.5–4.9)
PLATELET # BLD AUTO: 291 K/UL (ref 135–450)
PMV BLD AUTO: 7.4 FL (ref 5–10.5)
POTASSIUM SERPL-SCNC: 3.6 MMOL/L (ref 3.5–5.1)
PROT UR STRIP.AUTO-MCNC: 100 MG/DL
RBC # BLD AUTO: 2.98 M/UL (ref 4–5.2)
RBC CLUMPS #/AREA URNS AUTO: 1 /HPF (ref 0–4)
SODIUM SERPL-SCNC: 137 MMOL/L (ref 136–145)
SP GR UR STRIP.AUTO: 1.02 (ref 1–1.03)
UA DIPSTICK W REFLEX MICRO PNL UR: YES
URN SPEC COLLECT METH UR: ABNORMAL
UROBILINOGEN UR STRIP-ACNC: 1 E.U./DL
WBC # BLD AUTO: 7.7 K/UL (ref 4–11)
WBC #/AREA URNS AUTO: 1 /HPF (ref 0–5)

## 2024-04-05 PROCEDURE — 94760 N-INVAS EAR/PLS OXIMETRY 1: CPT

## 2024-04-05 PROCEDURE — 9990000010 HC NO CHARGE VISIT: Performed by: PHYSICAL THERAPIST

## 2024-04-05 PROCEDURE — 6370000000 HC RX 637 (ALT 250 FOR IP): Performed by: INTERNAL MEDICINE

## 2024-04-05 PROCEDURE — 6370000000 HC RX 637 (ALT 250 FOR IP): Performed by: REGISTERED NURSE

## 2024-04-05 PROCEDURE — 81001 URINALYSIS AUTO W/SCOPE: CPT

## 2024-04-05 PROCEDURE — 36415 COLL VENOUS BLD VENIPUNCTURE: CPT

## 2024-04-05 PROCEDURE — 80069 RENAL FUNCTION PANEL: CPT

## 2024-04-05 PROCEDURE — 85025 COMPLETE CBC W/AUTO DIFF WBC: CPT

## 2024-04-05 PROCEDURE — 2580000003 HC RX 258: Performed by: INTERNAL MEDICINE

## 2024-04-05 RX ORDER — INSULIN LISPRO 100 [IU]/ML
0-4 INJECTION, SOLUTION INTRAVENOUS; SUBCUTANEOUS NIGHTLY
Status: DISCONTINUED | OUTPATIENT
Start: 2024-04-05 | End: 2024-04-05 | Stop reason: HOSPADM

## 2024-04-05 RX ORDER — INSULIN LISPRO 100 [IU]/ML
0-4 INJECTION, SOLUTION INTRAVENOUS; SUBCUTANEOUS
Status: DISCONTINUED | OUTPATIENT
Start: 2024-04-05 | End: 2024-04-05 | Stop reason: HOSPADM

## 2024-04-05 RX ADMIN — RANOLAZINE 1000 MG: 500 TABLET, FILM COATED, EXTENDED RELEASE ORAL at 09:23

## 2024-04-05 RX ADMIN — SODIUM CHLORIDE, PRESERVATIVE FREE 10 ML: 5 INJECTION INTRAVENOUS at 09:29

## 2024-04-05 RX ADMIN — POTASSIUM CHLORIDE 10 MEQ: 750 TABLET, EXTENDED RELEASE ORAL at 09:23

## 2024-04-05 RX ADMIN — DOCUSATE SODIUM 100 MG: 100 CAPSULE, LIQUID FILLED ORAL at 09:23

## 2024-04-05 RX ADMIN — MONTELUKAST 10 MG: 10 TABLET, FILM COATED ORAL at 09:22

## 2024-04-05 RX ADMIN — AZITHROMYCIN 250 MG: 250 TABLET, FILM COATED ORAL at 09:23

## 2024-04-05 RX ADMIN — CARVEDILOL 3.12 MG: 3.12 TABLET, FILM COATED ORAL at 09:23

## 2024-04-05 RX ADMIN — ASPIRIN 81 MG 81 MG: 81 TABLET ORAL at 09:23

## 2024-04-05 RX ADMIN — DULOXETINE HYDROCHLORIDE 20 MG: 20 CAPSULE, DELAYED RELEASE ORAL at 09:23

## 2024-04-05 RX ADMIN — NIFEDIPINE 60 MG: 30 TABLET, EXTENDED RELEASE ORAL at 09:28

## 2024-04-05 RX ADMIN — TICAGRELOR 90 MG: 90 TABLET ORAL at 09:23

## 2024-04-05 RX ADMIN — PANTOPRAZOLE SODIUM 40 MG: 40 TABLET, DELAYED RELEASE ORAL at 06:03

## 2024-04-05 RX ADMIN — POLYETHYLENE GLYCOL 3350 17 G: 17 POWDER, FOR SOLUTION ORAL at 09:21

## 2024-04-05 RX ADMIN — LEVETIRACETAM 500 MG: 500 TABLET, FILM COATED ORAL at 09:23

## 2024-04-05 ASSESSMENT — PAIN SCALES - GENERAL: PAINLEVEL_OUTOF10: 0

## 2024-04-05 NOTE — PROGRESS NOTES
[facility]  Hypoglycemia Event and Prevention Plan      NAME: Maren Meza  MEDICAL RECORD NUMBER:  [id number]  AGE: 68 y.o.   GENDER: female  : 1955  EPISODE DATE:  2024     Data     Recent Labs     24  1248 24  1813 24  1831 24  2006 24  0205 24  0811   POCGLU 82 57* 105* 73 70 99       HgBA1c:    Lab Results   Component Value Date/Time    LABA1C 8.8 2024 04:28 AM       BUN/Creatinine:    Lab Results   Component Value Date/Time    BUN 17 2024 07:13 AM    CREATININE 1.6 2024 07:13 AM       Medications  Scheduled Medications:   DULoxetine  20 mg Oral Daily    levETIRAcetam  500 mg Oral BID    NIFEdipine  60 mg Oral BID    insulin glargine  8 Units SubCUTAneous QAM    carvedilol  3.125 mg Oral BID WC    potassium chloride  10 mEq Oral Daily    ticagrelor  90 mg Oral BID    insulin lispro  0-16 Units SubCUTAneous 4x Daily WC    sodium chloride flush  5-40 mL IntraVENous 2 times per day    azithromycin  250 mg Oral Daily    docusate sodium  100 mg Oral Daily    ranolazine  1,000 mg Oral BID    pantoprazole  40 mg Oral QAM AC    montelukast  10 mg Oral Daily    linaclotide  290 mcg Oral QAM AC    mometasone-formoterol  2 puff Inhalation BID RT    atorvastatin  80 mg Oral Nightly    aspirin  81 mg Oral Daily    amitriptyline  40 mg Oral Nightly       Diet  Current diet/supplement order: ADULT DIET; Easy to Chew; 4 carb choices (60 gm/meal)     Recorded PO: PO Meals Eaten (%): 76 - 100% last meal in flowsheets      Action      Glucose Target: 140-180    Recommend: Decrease SSI to low dose    Treatment (manjinder all that apply): Unknown    Physician Notified of event: Yes. Eneida Burger MD    Responce     Achieved POCT Blood Glucose greater than 70 mg/dl: Yes      Medication plan updated: No: provider to review      Electronically signed by Katherine Ren RN on 2024 at 8:58 AM

## 2024-04-05 NOTE — PLAN OF CARE
Problem: Discharge Planning  Goal: Discharge to home or other facility with appropriate resources  4/5/2024 1329 by Hilda Lazo RN  Outcome: Progressing     Problem: Pain  Goal: Verbalizes/displays adequate comfort level or baseline comfort level  4/5/2024 1329 by Hilda Lazo RN  Outcome: Progressing     Problem: Skin/Tissue Integrity  Goal: Absence of new skin breakdown  Description: 1.  Monitor for areas of redness and/or skin breakdown  2.  Assess vascular access sites hourly  3.  Every 4-6 hours minimum:  Change oxygen saturation probe site  4.  Every 4-6 hours:  If on nasal continuous positive airway pressure, respiratory therapy assess nares and determine need for appliance change or resting period.  4/5/2024 1329 by Hilda Lazo RN  Outcome: Progressing     Problem: ABCDS Injury Assessment  Goal: Absence of physical injury  4/5/2024 1329 by Hilda Lazo RN  Outcome: Progressing     Problem: Safety - Adult  Goal: Free from fall injury  4/5/2024 1329 by Hilda Lazo RN  Outcome: Progressing     Problem: Chronic Conditions and Co-morbidities  Goal: Patient's chronic conditions and co-morbidity symptoms are monitored and maintained or improved  4/5/2024 1329 by Hilda Lazo RN  Outcome: Progressing     Problem: Respiratory - Adult  Goal: Achieves optimal ventilation and oxygenation  4/5/2024 1329 by Hilda Lazo RN  Outcome: Progressing     Problem: Cardiovascular - Adult  Goal: Maintains optimal cardiac output and hemodynamic stability  4/5/2024 1329 by Hilda Lazo RN  Outcome: Progressing     Problem: Cardiovascular - Adult  Goal: Absence of cardiac dysrhythmias or at baseline  4/5/2024 1329 by Hilda Lazo RN  Outcome: Progressing     Problem: Safety - Medical Restraint  Goal: Remains free of injury from restraints (Restraint for Interference with Medical Device)  Description: INTERVENTIONS:  1. Determine that other, less restrictive measures

## 2024-04-05 NOTE — PROGRESS NOTES
Nephrology ([facility] Progress Note    CC: KOLBY on CKD3b    Subjective:    HPI:  Breathing comfortably.  No CP.  More awake. Renal function remains within baseline. BP better and orthostatics improved.   ROS:  Denies SOB  PMFSH:  medications reviewed.    Objective:  Blood pressure 134/83, pulse 80, temperature 98.3 °F (36.8 °C), temperature source Oral, resp. rate 16, height 1.524 m (5'), weight 52.2 kg (115 lb 1.3 oz), SpO2 100 %, not currently breastfeeding.    Intake/Output Summary (Last 24 hours) at 4/5/2024 1304  Last data filed at 4/5/2024 1022  Gross per 24 hour   Intake 420 ml   Output 900 ml   Net -480 ml       General:  NAD, A+Ox3  Chest:  CTAB  CVS:  RRR  Abdominal:  NTND, soft, +BS  Extremities:  no edema  Skin:  no rash    Labs:  Renal panel:  Lab Results   Component Value Date/Time     04/05/2024 07:13 AM    K 3.6 04/05/2024 07:13 AM    K 3.5 04/02/2024 04:15 AM    CO2 19 (L) 04/05/2024 07:13 AM    BUN 17 04/05/2024 07:13 AM    CREATININE 1.6 (H) 04/05/2024 07:13 AM    CALCIUM 8.8 04/05/2024 07:13 AM    PHOS 2.6 04/05/2024 07:13 AM    MG 1.80 04/02/2024 04:15 AM     CBC:  Lab Results   Component Value Date/Time    WBC 7.7 04/05/2024 07:13 AM    HGB 9.1 (L) 04/05/2024 07:13 AM    HCT 27.6 (L) 04/05/2024 07:13 AM     04/05/2024 07:13 AM       Assessment/Plan:  Reviewed old records and labs.    KOLBY on CKD3b: baseline Cr ~ 1.5-1.8 mg/dL. Follows with Dr. Chung in office. Initially secondary LYNN then from volume depletion due to poor PO intake.            - Pk Cr 2.4 mg/dL. Cr 1.6 mg/dL this AM. Remains within baseline.               - Continue holding losartan               - Avoid hypotension. Avoid nephrotoxic agents when possible              - OK for d/c from renal standpoint. Follow up with Dr. Chung in 1-2 weeks.     Hypertension  Orthostatic Hypotension              - Positive orthostatics with drop of ~ 40-50 mmHg and syncopal episode on 4/3   - May need to accept

## 2024-04-05 NOTE — PROGRESS NOTES
Physical Therapy      Maren Meza  [id number]  [id number]  Attempted to see for PT session however pt walking in room Ind without a device; pt states \"I'm going home and I don't need therapy any longer\" . Clarified with pt that she does not want home therapy; will sign off from therapy at this time  Electronically signed by GEORGETTE GU, PT on 4/5/2024 at 11:05 AM

## 2024-04-05 NOTE — PROGRESS NOTES
Discharge orders acknowledged by RN . Discharge teaching completed with pt and family. AVS reviewed and all questions answered. Medication regimen reviewed and pt understands schedule. Follow up appointments also reviewed with pt and resources given for discharge. Meds filled and sent to bedside. IV removed. Bedside monitor removed from pt. Pt vitals WDL. Pt discharged with all belongings to home with patient. Pt transported off of unit via wheelchair. No complications.     Electronically signed by Hilda Lazo RN on 4/5/2024 at 4:40 PM

## 2024-04-05 NOTE — FLOWSHEET NOTE
04/05/24 0832   Treatment Team Notification   Reason for Communication Medication concern  (Inquiring about lower lantus d/t blood sugars below 100 with poor intake.)   Name of Team Member Notified Tao   Treatment Team Role Attending Provider   Method of Communication Secure Message   Response See orders  (d/c lantus)   Notification Time 0832     Electronically signed by Hilda Lazo RN on 4/5/2024 at 9:40 AM

## 2024-04-05 NOTE — PROGRESS NOTES
[facility]  Diabetes Education   Progress Note       NAME:  Maren Meza  MEDICAL RECORD NUMBER:  [id number]  AGE: 68 y.o.   GENDER: female  : 1955  TODAY'S DATE:  2024    Subjective   Reason for Diabetic Education Evaluation and Assessment: hypoglycemia     Maren agrees to meet with me for diabetes support.  She reports eating less than usual with limited appetite.      She reports nocturnal hypoglycemia at home.  She finds her CGM alarms helpful.      She is hoping to be discharged soon.      Visit Type: evaluation      Maren Meza is a 68 y.o. female referred by:     [] Physician  [] Nursing  [x] Chart Review   [] Other:     PAST MEDICAL HISTORY        Diagnosis Date    Acid reflux     Anemia     Anxiety and depression     Arthritis     Asthma     Atrial fibrillation (HCC)     CAD (coronary artery disease) 12/3/2012    Cerebral artery occlusion with cerebral infarction ([facility])     TIA\"\"S--right sided weakness & headache    CHF (congestive heart failure) ([facility])     Chronic kidney disease--stage III     40% kidney function    COPD (chronic obstructive pulmonary disease) ([facility])     DM2 (diabetes mellitus, type 2) ([facility])     Dysarthria     Fibromyalgia 2016    Headache(784.0) 2013    Hemisensory loss     History of blood transfusion 2020    pt denies having transfusion reaction    Hyperlipidemia     Hypertension     IBS (irritable bowel syndrome)     Inferior vena cava occlusion (HCC)     Keratitis     Meningioma (HCC)     MI, old     Neuropathy     Superior vena cava obstruction     Temporal arteritis (HCC) 2014    Wears glasses        PAST SURGICAL HISTORY    Past Surgical History:   Procedure Laterality Date    ABLATION OF DYSRHYTHMIC FOCUS    and 2020    times 2    ARTERY BIOPSY Right 2014    RIGHT TEMPORAL ARTERY BIOPSY    CAROTID ARTERY SURGERY Left     clean up per pt    CATARACT REMOVAL Bilateral     CHOLECYSTECTOMY      COLONOSCOPY N/A 2021

## 2024-04-05 NOTE — PLAN OF CARE
Problem: Discharge Planning  Goal: Discharge to home or other facility with appropriate resources  4/5/2024 1642 by Hilda Lazo RN  Outcome: Completed     Problem: Pain  Goal: Verbalizes/displays adequate comfort level or baseline comfort level  4/5/2024 1642 by Hilda Lazo RN  Outcome: Completed     Problem: Skin/Tissue Integrity  Goal: Absence of new skin breakdown  Description: 1.  Monitor for areas of redness and/or skin breakdown  2.  Assess vascular access sites hourly  3.  Every 4-6 hours minimum:  Change oxygen saturation probe site  4.  Every 4-6 hours:  If on nasal continuous positive airway pressure, respiratory therapy assess nares and determine need for appliance change or resting period.  4/5/2024 1642 by Hilda Lazo RN  Outcome: Completed     Problem: ABCDS Injury Assessment  Goal: Absence of physical injury  4/5/2024 1642 by Hilda Lazo RN  Outcome: Completed     Problem: Safety - Adult  Goal: Free from fall injury  4/5/2024 1642 by Hilda Lazo RN  Outcome: Completed     Problem: Chronic Conditions and Co-morbidities  Goal: Patient's chronic conditions and co-morbidity symptoms are monitored and maintained or improved  4/5/2024 1642 by Hilda Lazo RN  Outcome: Completed     Problem: Respiratory - Adult  Goal: Achieves optimal ventilation and oxygenation  4/5/2024 1642 by Hilda Lazo RN  Outcome: Completed     Problem: Cardiovascular - Adult  Goal: Maintains optimal cardiac output and hemodynamic stability  4/5/2024 1642 by Hilda Lazo RN  Outcome: Completed     Problem: Cardiovascular - Adult  Goal: Absence of cardiac dysrhythmias or at baseline  4/5/2024 1642 by Hilda Lazo RN  Outcome: Completed

## 2024-04-05 NOTE — PLAN OF CARE
Problem: Discharge Planning  Goal: Discharge to home or other facility with appropriate resources  4/5/2024 0016 by Ester Smith RN  Outcome: Progressing  4/4/2024 1111 by Hilda Lazo RN  Outcome: Progressing     Problem: Pain  Goal: Verbalizes/displays adequate comfort level or baseline comfort level  4/5/2024 0016 by Ester Smith RN  Outcome: Progressing  4/4/2024 1111 by Hilda Lazo RN  Outcome: Progressing     Problem: Skin/Tissue Integrity  Goal: Absence of new skin breakdown  Description: 1.  Monitor for areas of redness and/or skin breakdown  2.  Assess vascular access sites hourly  3.  Every 4-6 hours minimum:  Change oxygen saturation probe site  4.  Every 4-6 hours:  If on nasal continuous positive airway pressure, respiratory therapy assess nares and determine need for appliance change or resting period.  4/5/2024 0016 by Ester Smith RN  Outcome: Progressing  4/4/2024 1111 by Hilda Lazo RN  Outcome: Progressing     Problem: ABCDS Injury Assessment  Goal: Absence of physical injury  4/5/2024 0016 by Ester Smith RN  Outcome: Progressing  4/4/2024 1111 by Hilda Lazo RN  Outcome: Progressing     Problem: Safety - Adult  Goal: Free from fall injury  4/5/2024 0016 by Ester Smith RN  Outcome: Progressing  4/4/2024 1111 by Hilda Lazo RN  Outcome: Progressing     Problem: Chronic Conditions and Co-morbidities  Goal: Patient's chronic conditions and co-morbidity symptoms are monitored and maintained or improved  4/5/2024 0016 by Ester Smith RN  Outcome: Progressing  4/4/2024 1111 by Hilda Lazo RN  Outcome: Progressing     Problem: Respiratory - Adult  Goal: Achieves optimal ventilation and oxygenation  4/5/2024 0016 by Ester Smith RN  Outcome: Progressing  4/4/2024 1111 by Hilda Lazo RN  Outcome: Progressing     Problem: Cardiovascular - Adult  Goal: Maintains optimal cardiac output and hemodynamic stability  4/5/2024 0016 by  Ester Smith, RN  Outcome: Progressing  4/4/2024 1111 by Hilda Lazo, RN  Outcome: Progressing  Goal: Absence of cardiac dysrhythmias or at baseline  4/5/2024 0016 by Ester Smith, RN  Outcome: Progressing  4/4/2024 1111 by Hilda Lazo, RN  Outcome: Progressing   Alert and oriented x4 Resp. Easy and even Sats. WNL on RA LCTA No c/o pain Cont. Per bathroom with walker and SBA Turns and repositions self Free from fall/injury

## 2024-04-05 NOTE — CARE COORDINATION
Case Management Discharge Note          Date / Time of Note: 4/5/2024 2:40 PM                  Patient Name: Maren Meza   YOB: 1955  Diagnosis: Chest pain [R07.9]  Elevated troponin [R79.89]  Acute kidney injury superimposed on chronic kidney disease (HCC) [N17.9, N18.9]  Chest pain, unspecified type [R07.9]   Date / Time: 3/26/2024 12:36 PM    Financial:  Payor: [employer] / Plan: [employer] [place] DUAL / Product Type: *No Product type* /      Pharmacy:    [facility] [address] [phone] - F [phone]  [address] [place] [address]  Phone: [phone] Fax: [phone]    [facility] [address] [phone] - F [phone]  [address] [facility] [facility] [address]  Phone: [phone] Fax: [phone]    [facility] - [facility] [address] [facility] [phone] - F [phone]  [address] [address]  Phone: [phone] Fax: [phone]    [id number] [place], [place] - [address] - P [phone] - F [phone]  [address]  [facility] [address]  Phone: [phone] Fax: [phone]      Assistance purchasing medications?: Potential Assistance Purchasing Medications: No  Assistance provided by Case Management: None at this time    DISCHARGE Disposition: Home with Home Health Care      Home Care:  Home Care ordered at discharge: Yes  Home Care Agency: [facility]  Phone: [phone]  Fax: [phone]  Orders faxed: Yes      Transportation:  Transportation PLAN for discharge: family   Mode of Transport: Private Car  Pt stated her son will be transporting her home or finding her a ride.     IMM Completed:   Yes, Case management has presented and reviewed IMM letter #2.       IMM Letter date given:: 04/05/24   .   Patient and/or family/POA verbalized understanding of their medicare rights and 
Discharge Planning:  SW contacted [facility] to verify the services this pt had PTA.  Pt was active with [employer] for 31 hrs per week of personal care assistance.  Pt has 7 home delivered meals per week and a lifeline.  Pt was also active with [facility] for RN only.  Awaiting updated therapy notes to assist with d/c recommendations/needs.     Neurology has been consulted today after a witnessed seizure like event.  SW will continue to follow to assist with d/c planning needs.     PLAN: From home alone.  Apt with elevator in building.  Active with [facility].  Awaiting PT/OT recommendations.  Will need transport before 4pm on day of d/c as pts son has her apartment keys and goes to work at 4pm.  CUATE Griffiths [facility]  [phone]  Electronically signed by CUATE Sears on 3/29/2024 at 12:37 PM    
Discharge Planning:  SW met with pt to discuss d/c plans.  Pt plans to return to her apartment upon d/c and is agreeable to [facility]  [facility] and will need PT/OT as well.  TAWANNA spoke with Eleazar with [facility] who confirmed [facility] able to provide PT/OT/RN upon d/c.  New HC orders and signed RONNIE needed.   Pt stated she will try to find a ride home but if unable, she will need a ride and will need to be home before 1pm as her son has to go to work and has her apartment keys.  PLAN: Home [facility] HC [facility].  PT/OT/RN  Pt will try to find a ride home but if unable, will need a ride and will need to be home before 1pm as her son has her keys and goes to work.  Pt to resume COA services through the [facility] Waiver.   CUATE Griffiths \[facility]\""  [phone]  Electronically signed by CUATE Sears on 4/1/2024 at 2:35 PM    
PAYOR CARE COORDINATOR INFORMATION:    COMPANY:  [employer]  NAME:  Jorge Luis  PH:  [phone]      Patricia Willoughby Sr. Administrative Assist, Case Management  [phone]  Electronically signed by Patricia Willoughby on 3/28/2024 at 2:19 PM     4/10/24  AVS sent via secure fax.    Patricia Willoughby Sr. Administrative Assist, Case Management  [phone]  Electronically signed by Patricia Willoughby on 4/10/2024 at 9:48 AM    
Patient is involved and working with [facility] as well. Her  is Destiny Whitt. They are her primary care provider as well. She can be reached at [phone].     Binta Beadr is her primary care provider and her most recent appointment on March 19, 2024.     Respectfully submitted,    Joslyn CUELLO, RACHIDS  [facility] [place]   [phone]    Electronically signed by CUATE Dubon, LSW on 3/29/2024 at 10:39 AM    
Patient will likely discharge home tomorrow. LSW met with patient at bedside regarding therapy recommendations of initial 24 hr assist. Patient had a rapid response today after losing consciousness in bathroom. Patient states that if discharged early tomorrow she may have a ride. Patient is also going to reach out to her daughter about potentially staying with her for a short time at discharge she she can have 24 hr assistance. If patient goes home she will need to be discharged prior to 12:00pm to have family transport. Patient states that her son has her apartment keys and goes to work at 1:00.  Patient encouraged to have family bring apartment keys to bedside in the event she is unable to be discharged prior to son leaving for work at 1:00.   Electronically signed by MARISSA Diaz on 4/3/2024 at 4:49 PM  [phone]  
setup so she is home prior to 4pm.    The Plan for Transition of Care is related to the following treatment goals of Chest pain [R07.9]  Elevated troponin [R79.89]  Acute kidney injury superimposed on chronic kidney disease (HCC) [N17.9, N18.9]  Chest pain, unspecified type [R07.9]    IF APPLICABLE: The Patient and/or patient representative Maren and her family were provided with a choice of provider and agrees with the discharge plan. Freedom of choice list with basic dialogue that supports the patient's individualized plan of care/goals and shares the quality data associated with the providers was provided to: Patient   Patient Representative Name:       The Patient and/or Patient Representative Agree with the Discharge Plan? Yes    Salvador Herrera RN  Case Management Department  Ph: [phone] Fax: [phone]

## 2024-04-05 NOTE — PROGRESS NOTES
Patient has dressed in her street clothes including gym shoes and repeatedly calling wanting to know when she is being discharged.  Educated patient that urinalysis is ordered and a urine sample is needed from patient.  Educated patient that she does not have a discharge order yet.    Electronically signed by Hilda Lazo RN on 4/5/2024 at 11:20 AM

## 2024-04-09 DIAGNOSIS — I10 ESSENTIAL HYPERTENSION: ICD-10-CM

## 2024-04-09 DIAGNOSIS — J30.89 ALLERGIC RHINITIS DUE TO OTHER ALLERGIC TRIGGER, UNSPECIFIED SEASONALITY: ICD-10-CM

## 2024-04-09 RX ORDER — OMEPRAZOLE 20 MG/1
20 CAPSULE, DELAYED RELEASE ORAL DAILY
Qty: 30 CAPSULE | Refills: 1 | OUTPATIENT
Start: 2024-04-09

## 2024-04-09 RX ORDER — MONTELUKAST SODIUM 10 MG/1
10 TABLET ORAL DAILY
Qty: 30 TABLET | Refills: 1 | OUTPATIENT
Start: 2024-04-09

## 2024-04-11 DIAGNOSIS — I25.10 CORONARY ARTERY DISEASE INVOLVING NATIVE CORONARY ARTERY OF NATIVE HEART WITHOUT ANGINA PECTORIS: ICD-10-CM

## 2024-04-11 NOTE — TELEPHONE ENCOUNTER
Called and spoke to patient to let her know the testing is needed. She is agreeable. I let her know that someone will call her back to get the Doppler scheduled.

## 2024-04-11 NOTE — TELEPHONE ENCOUNTER
Maren is scheduled for doppler 4/18 at [facility]. Also OV with DORIAN Miramontes 5/6 for follow up. Maren is aware of time/date/sending [facility] kassidy via [employer] per PT's request.

## 2024-04-12 RX ORDER — ATORVASTATIN CALCIUM 80 MG/1
80 TABLET, FILM COATED ORAL NIGHTLY
Qty: 90 TABLET | Refills: 5 | Status: SHIPPED | OUTPATIENT
Start: 2024-04-12

## 2024-04-12 NOTE — TELEPHONE ENCOUNTER
Medication:   Requested Prescriptions     Pending Prescriptions Disp Refills    atorvastatin (LIPITOR) 80 MG tablet [Pharmacy Med Name: ATORVASTATIN CALCIUM 80 MG 80 Tablet] 90 tablet 5     Sig: TAKE 1 TABLET BY MOUTH NIGHTLY        Last Filled:      Patient Phone Number: [phone] (home)     Last appt: 7/21/2023   Next appt: Visit date not found    Last OARRS:       3/30/2023     1:54 PM   RX Monitoring   Periodic Controlled Substance Monitoring Possible medication side effects, risk of tolerance/dependence & alternative treatments discussed.;No signs of potential drug abuse or diversion identified.

## 2024-04-21 ENCOUNTER — HOSPITAL ENCOUNTER (INPATIENT)
Age: 69
LOS: 2 days | Discharge: HOME HEALTH | DRG: 193 | End: 2024-04-23
Attending: EMERGENCY MEDICINE | Admitting: INTERNAL MEDICINE
Payer: COMMERCIAL

## 2024-04-21 ENCOUNTER — APPOINTMENT (OUTPATIENT)
Dept: GENERAL RADIOLOGY | Age: 69
DRG: 193 | End: 2024-04-21
Payer: COMMERCIAL

## 2024-04-21 DIAGNOSIS — Z86.79 HX OF CORONARY ARTERY DISEASE: ICD-10-CM

## 2024-04-21 DIAGNOSIS — R07.9 CHEST PAIN, UNSPECIFIED TYPE: Primary | ICD-10-CM

## 2024-04-21 LAB
ALBUMIN SERPL-MCNC: 3.7 G/DL (ref 3.4–5)
ALBUMIN/GLOB SERPL: 1 {RATIO} (ref 1.1–2.2)
ALP SERPL-CCNC: 155 U/L (ref 40–129)
ALT SERPL-CCNC: 9 U/L (ref 10–40)
ANION GAP SERPL CALCULATED.3IONS-SCNC: 13 MMOL/L (ref 3–16)
ANTI-XA UNFRAC HEPARIN: 0.66 IU/ML (ref 0.3–0.7)
APTT BLD: 32.4 SEC (ref 22.1–36.4)
AST SERPL-CCNC: 15 U/L (ref 15–37)
BASOPHILS # BLD: 0 K/UL (ref 0–0.2)
BASOPHILS NFR BLD: 0.8 %
BILIRUB SERPL-MCNC: <0.2 MG/DL (ref 0–1)
BUN SERPL-MCNC: 17 MG/DL (ref 7–20)
CALCIUM SERPL-MCNC: 9.4 MG/DL (ref 8.3–10.6)
CHLORIDE SERPL-SCNC: 104 MMOL/L (ref 99–110)
CO2 SERPL-SCNC: 22 MMOL/L (ref 21–32)
CREAT SERPL-MCNC: 1.7 MG/DL (ref 0.6–1.2)
DEPRECATED RDW RBC AUTO: 16.8 % (ref 12.4–15.4)
EOSINOPHIL # BLD: 0.1 K/UL (ref 0–0.6)
EOSINOPHIL NFR BLD: 2.5 %
GFR SERPLBLD CREATININE-BSD FMLA CKD-EPI: 33 ML/MIN/{1.73_M2}
GLUCOSE BLD-MCNC: 111 MG/DL (ref 70–99)
GLUCOSE SERPL-MCNC: 231 MG/DL (ref 70–99)
HCT VFR BLD AUTO: 27.2 % (ref 36–48)
HGB BLD-MCNC: 9 G/DL (ref 12–16)
LYMPHOCYTES # BLD: 0.9 K/UL (ref 1–5.1)
LYMPHOCYTES NFR BLD: 18.5 %
MCH RBC QN AUTO: 30.9 PG (ref 26–34)
MCHC RBC AUTO-ENTMCNC: 32.9 G/DL (ref 31–36)
MCV RBC AUTO: 93.9 FL (ref 80–100)
MONOCYTES # BLD: 0.3 K/UL (ref 0–1.3)
MONOCYTES NFR BLD: 5.3 %
NEUTROPHILS # BLD: 3.6 K/UL (ref 1.7–7.7)
NEUTROPHILS NFR BLD: 72.9 %
PERFORMED ON: ABNORMAL
PLATELET # BLD AUTO: 270 K/UL (ref 135–450)
PMV BLD AUTO: 7.1 FL (ref 5–10.5)
POTASSIUM SERPL-SCNC: 3.7 MMOL/L (ref 3.5–5.1)
PROT SERPL-MCNC: 7.4 G/DL (ref 6.4–8.2)
RBC # BLD AUTO: 2.9 M/UL (ref 4–5.2)
SODIUM SERPL-SCNC: 139 MMOL/L (ref 136–145)
TROPONIN, HIGH SENSITIVITY: 40 NG/L (ref 0–14)
TROPONIN, HIGH SENSITIVITY: 43 NG/L (ref 0–14)
TROPONIN, HIGH SENSITIVITY: 44 NG/L (ref 0–14)
TROPONIN, HIGH SENSITIVITY: 46 NG/L (ref 0–14)
WBC # BLD AUTO: 5 K/UL (ref 4–11)

## 2024-04-21 PROCEDURE — 96376 TX/PRO/DX INJ SAME DRUG ADON: CPT

## 2024-04-21 PROCEDURE — 96375 TX/PRO/DX INJ NEW DRUG ADDON: CPT

## 2024-04-21 PROCEDURE — 6370000000 HC RX 637 (ALT 250 FOR IP): Performed by: INTERNAL MEDICINE

## 2024-04-21 PROCEDURE — 36415 COLL VENOUS BLD VENIPUNCTURE: CPT

## 2024-04-21 PROCEDURE — 71046 X-RAY EXAM CHEST 2 VIEWS: CPT

## 2024-04-21 PROCEDURE — 85520 HEPARIN ASSAY: CPT

## 2024-04-21 PROCEDURE — 6360000002 HC RX W HCPCS: Performed by: INTERNAL MEDICINE

## 2024-04-21 PROCEDURE — 6360000002 HC RX W HCPCS: Performed by: REGISTERED NURSE

## 2024-04-21 PROCEDURE — 96374 THER/PROPH/DIAG INJ IV PUSH: CPT

## 2024-04-21 PROCEDURE — 83036 HEMOGLOBIN GLYCOSYLATED A1C: CPT

## 2024-04-21 PROCEDURE — 80053 COMPREHEN METABOLIC PANEL: CPT

## 2024-04-21 PROCEDURE — 6360000002 HC RX W HCPCS: Performed by: EMERGENCY MEDICINE

## 2024-04-21 PROCEDURE — 93005 ELECTROCARDIOGRAM TRACING: CPT | Performed by: INTERNAL MEDICINE

## 2024-04-21 PROCEDURE — 6370000000 HC RX 637 (ALT 250 FOR IP): Performed by: EMERGENCY MEDICINE

## 2024-04-21 PROCEDURE — 85730 THROMBOPLASTIN TIME PARTIAL: CPT

## 2024-04-21 PROCEDURE — 85025 COMPLETE CBC W/AUTO DIFF WBC: CPT

## 2024-04-21 PROCEDURE — 93005 ELECTROCARDIOGRAM TRACING: CPT | Performed by: EMERGENCY MEDICINE

## 2024-04-21 PROCEDURE — 2580000003 HC RX 258: Performed by: INTERNAL MEDICINE

## 2024-04-21 PROCEDURE — 99285 EMERGENCY DEPT VISIT HI MDM: CPT

## 2024-04-21 PROCEDURE — 94761 N-INVAS EAR/PLS OXIMETRY MLT: CPT

## 2024-04-21 PROCEDURE — 6370000000 HC RX 637 (ALT 250 FOR IP): Performed by: REGISTERED NURSE

## 2024-04-21 PROCEDURE — 84484 ASSAY OF TROPONIN QUANT: CPT

## 2024-04-21 PROCEDURE — 1200000000 HC SEMI PRIVATE

## 2024-04-21 RX ORDER — GLUCAGON 1 MG/ML
1 KIT INJECTION PRN
Status: DISCONTINUED | OUTPATIENT
Start: 2024-04-21 | End: 2024-04-23 | Stop reason: HOSPADM

## 2024-04-21 RX ORDER — QUETIAPINE FUMARATE 25 MG/1
25 TABLET, FILM COATED ORAL NIGHTLY
Status: DISCONTINUED | OUTPATIENT
Start: 2024-04-21 | End: 2024-04-23 | Stop reason: HOSPADM

## 2024-04-21 RX ORDER — POTASSIUM CHLORIDE 20 MEQ/1
40 TABLET, EXTENDED RELEASE ORAL PRN
Status: DISCONTINUED | OUTPATIENT
Start: 2024-04-21 | End: 2024-04-21

## 2024-04-21 RX ORDER — NITROGLYCERIN 0.4 MG/1
0.4 TABLET SUBLINGUAL EVERY 5 MIN PRN
Status: COMPLETED | OUTPATIENT
Start: 2024-04-21 | End: 2024-04-21

## 2024-04-21 RX ORDER — PANTOPRAZOLE SODIUM 40 MG/1
40 TABLET, DELAYED RELEASE ORAL
Status: DISCONTINUED | OUTPATIENT
Start: 2024-04-22 | End: 2024-04-23 | Stop reason: HOSPADM

## 2024-04-21 RX ORDER — ATORVASTATIN CALCIUM 40 MG/1
80 TABLET, FILM COATED ORAL NIGHTLY
Status: DISCONTINUED | OUTPATIENT
Start: 2024-04-21 | End: 2024-04-21 | Stop reason: SDUPTHER

## 2024-04-21 RX ORDER — POTASSIUM CHLORIDE 7.45 MG/ML
10 INJECTION INTRAVENOUS PRN
Status: DISCONTINUED | OUTPATIENT
Start: 2024-04-21 | End: 2024-04-21

## 2024-04-21 RX ORDER — DULOXETIN HYDROCHLORIDE 20 MG/1
20 CAPSULE, DELAYED RELEASE ORAL DAILY
Status: DISCONTINUED | OUTPATIENT
Start: 2024-04-21 | End: 2024-04-23 | Stop reason: HOSPADM

## 2024-04-21 RX ORDER — LEVETIRACETAM 500 MG/1
500 TABLET ORAL 2 TIMES DAILY
Status: DISCONTINUED | OUTPATIENT
Start: 2024-04-21 | End: 2024-04-23 | Stop reason: HOSPADM

## 2024-04-21 RX ORDER — FAMOTIDINE 20 MG/1
20 TABLET, FILM COATED ORAL 2 TIMES DAILY
Status: DISCONTINUED | OUTPATIENT
Start: 2024-04-21 | End: 2024-04-21

## 2024-04-21 RX ORDER — INSULIN LISPRO 100 [IU]/ML
0-4 INJECTION, SOLUTION INTRAVENOUS; SUBCUTANEOUS NIGHTLY
Status: DISCONTINUED | OUTPATIENT
Start: 2024-04-21 | End: 2024-04-23 | Stop reason: HOSPADM

## 2024-04-21 RX ORDER — HEPARIN SODIUM 10000 [USP'U]/100ML
0-4000 INJECTION, SOLUTION INTRAVENOUS CONTINUOUS
Status: DISCONTINUED | OUTPATIENT
Start: 2024-04-21 | End: 2024-04-22

## 2024-04-21 RX ORDER — MORPHINE SULFATE 2 MG/ML
2 INJECTION, SOLUTION INTRAMUSCULAR; INTRAVENOUS
Status: DISCONTINUED | OUTPATIENT
Start: 2024-04-21 | End: 2024-04-23 | Stop reason: HOSPADM

## 2024-04-21 RX ORDER — MORPHINE SULFATE 2 MG/ML
2 INJECTION, SOLUTION INTRAMUSCULAR; INTRAVENOUS
Status: COMPLETED | OUTPATIENT
Start: 2024-04-21 | End: 2024-04-21

## 2024-04-21 RX ORDER — ONDANSETRON 2 MG/ML
4 INJECTION INTRAMUSCULAR; INTRAVENOUS ONCE
Status: COMPLETED | OUTPATIENT
Start: 2024-04-21 | End: 2024-04-21

## 2024-04-21 RX ORDER — BUSPIRONE HYDROCHLORIDE 5 MG/1
5 TABLET ORAL 2 TIMES DAILY
Status: DISCONTINUED | OUTPATIENT
Start: 2024-04-21 | End: 2024-04-23 | Stop reason: HOSPADM

## 2024-04-21 RX ORDER — ACETAMINOPHEN 325 MG/1
650 TABLET ORAL EVERY 6 HOURS PRN
Status: DISCONTINUED | OUTPATIENT
Start: 2024-04-21 | End: 2024-04-23 | Stop reason: HOSPADM

## 2024-04-21 RX ORDER — HEPARIN SODIUM 1000 [USP'U]/ML
3070 INJECTION, SOLUTION INTRAVENOUS; SUBCUTANEOUS ONCE
Status: COMPLETED | OUTPATIENT
Start: 2024-04-21 | End: 2024-04-21

## 2024-04-21 RX ORDER — HEPARIN SODIUM 1000 [USP'U]/ML
30 INJECTION, SOLUTION INTRAVENOUS; SUBCUTANEOUS PRN
Status: DISCONTINUED | OUTPATIENT
Start: 2024-04-21 | End: 2024-04-21

## 2024-04-21 RX ORDER — ALBUTEROL SULFATE 90 UG/1
2 AEROSOL, METERED RESPIRATORY (INHALATION) EVERY 4 HOURS PRN
Status: DISCONTINUED | OUTPATIENT
Start: 2024-04-21 | End: 2024-04-23 | Stop reason: HOSPADM

## 2024-04-21 RX ORDER — INSULIN LISPRO 100 [IU]/ML
0-4 INJECTION, SOLUTION INTRAVENOUS; SUBCUTANEOUS
Status: DISCONTINUED | OUTPATIENT
Start: 2024-04-22 | End: 2024-04-23 | Stop reason: HOSPADM

## 2024-04-21 RX ORDER — SODIUM CHLORIDE 0.9 % (FLUSH) 0.9 %
5-40 SYRINGE (ML) INJECTION EVERY 12 HOURS SCHEDULED
Status: DISCONTINUED | OUTPATIENT
Start: 2024-04-21 | End: 2024-04-23 | Stop reason: HOSPADM

## 2024-04-21 RX ORDER — DEXTROSE MONOHYDRATE 100 MG/ML
INJECTION, SOLUTION INTRAVENOUS CONTINUOUS PRN
Status: DISCONTINUED | OUTPATIENT
Start: 2024-04-21 | End: 2024-04-23 | Stop reason: HOSPADM

## 2024-04-21 RX ORDER — ATORVASTATIN CALCIUM 40 MG/1
80 TABLET, FILM COATED ORAL NIGHTLY
Status: DISCONTINUED | OUTPATIENT
Start: 2024-04-21 | End: 2024-04-23 | Stop reason: HOSPADM

## 2024-04-21 RX ORDER — RANOLAZINE 500 MG/1
500 TABLET, EXTENDED RELEASE ORAL 2 TIMES DAILY
Status: DISCONTINUED | OUTPATIENT
Start: 2024-04-21 | End: 2024-04-23 | Stop reason: HOSPADM

## 2024-04-21 RX ORDER — MORPHINE SULFATE 4 MG/ML
4 INJECTION, SOLUTION INTRAMUSCULAR; INTRAVENOUS
Status: DISCONTINUED | OUTPATIENT
Start: 2024-04-21 | End: 2024-04-23 | Stop reason: HOSPADM

## 2024-04-21 RX ORDER — POLYETHYLENE GLYCOL 3350 17 G/17G
17 POWDER, FOR SOLUTION ORAL DAILY PRN
Status: DISCONTINUED | OUTPATIENT
Start: 2024-04-21 | End: 2024-04-23 | Stop reason: HOSPADM

## 2024-04-21 RX ORDER — SODIUM CHLORIDE 9 MG/ML
INJECTION, SOLUTION INTRAVENOUS PRN
Status: DISCONTINUED | OUTPATIENT
Start: 2024-04-21 | End: 2024-04-23 | Stop reason: HOSPADM

## 2024-04-21 RX ORDER — INSULIN GLARGINE 100 [IU]/ML
10 INJECTION, SOLUTION SUBCUTANEOUS NIGHTLY
Status: DISCONTINUED | OUTPATIENT
Start: 2024-04-21 | End: 2024-04-23 | Stop reason: HOSPADM

## 2024-04-21 RX ORDER — AMITRIPTYLINE HYDROCHLORIDE 10 MG/1
40 TABLET, FILM COATED ORAL NIGHTLY
Status: DISCONTINUED | OUTPATIENT
Start: 2024-04-21 | End: 2024-04-23 | Stop reason: HOSPADM

## 2024-04-21 RX ORDER — SODIUM CHLORIDE 0.9 % (FLUSH) 0.9 %
5-40 SYRINGE (ML) INJECTION PRN
Status: DISCONTINUED | OUTPATIENT
Start: 2024-04-21 | End: 2024-04-23 | Stop reason: HOSPADM

## 2024-04-21 RX ORDER — CARVEDILOL 6.25 MG/1
3.12 TABLET ORAL 2 TIMES DAILY WITH MEALS
Status: DISCONTINUED | OUTPATIENT
Start: 2024-04-21 | End: 2024-04-23 | Stop reason: HOSPADM

## 2024-04-21 RX ORDER — MORPHINE SULFATE 4 MG/ML
4 INJECTION, SOLUTION INTRAMUSCULAR; INTRAVENOUS ONCE
Status: COMPLETED | OUTPATIENT
Start: 2024-04-21 | End: 2024-04-21

## 2024-04-21 RX ORDER — SODIUM CHLORIDE, SODIUM LACTATE, POTASSIUM CHLORIDE, CALCIUM CHLORIDE 600; 310; 30; 20 MG/100ML; MG/100ML; MG/100ML; MG/100ML
INJECTION, SOLUTION INTRAVENOUS CONTINUOUS
Status: DISCONTINUED | OUTPATIENT
Start: 2024-04-21 | End: 2024-04-22

## 2024-04-21 RX ORDER — ASPIRIN 81 MG/1
81 TABLET, CHEWABLE ORAL DAILY
Status: DISCONTINUED | OUTPATIENT
Start: 2024-04-22 | End: 2024-04-23 | Stop reason: HOSPADM

## 2024-04-21 RX ORDER — NIFEDIPINE 30 MG/1
60 TABLET, EXTENDED RELEASE ORAL 2 TIMES DAILY
Status: DISCONTINUED | OUTPATIENT
Start: 2024-04-21 | End: 2024-04-23 | Stop reason: HOSPADM

## 2024-04-21 RX ORDER — ONDANSETRON 4 MG/1
4 TABLET, ORALLY DISINTEGRATING ORAL EVERY 8 HOURS PRN
Status: DISCONTINUED | OUTPATIENT
Start: 2024-04-21 | End: 2024-04-23 | Stop reason: HOSPADM

## 2024-04-21 RX ORDER — HEPARIN SODIUM 1000 [USP'U]/ML
60 INJECTION, SOLUTION INTRAVENOUS; SUBCUTANEOUS PRN
Status: DISCONTINUED | OUTPATIENT
Start: 2024-04-21 | End: 2024-04-21

## 2024-04-21 RX ORDER — MAGNESIUM SULFATE IN WATER 40 MG/ML
2000 INJECTION, SOLUTION INTRAVENOUS PRN
Status: DISCONTINUED | OUTPATIENT
Start: 2024-04-21 | End: 2024-04-21

## 2024-04-21 RX ORDER — DOCUSATE SODIUM 100 MG/1
100 CAPSULE, LIQUID FILLED ORAL 2 TIMES DAILY
Status: DISCONTINUED | OUTPATIENT
Start: 2024-04-21 | End: 2024-04-23 | Stop reason: HOSPADM

## 2024-04-21 RX ORDER — ASPIRIN 81 MG/1
162 TABLET, CHEWABLE ORAL ONCE
Status: COMPLETED | OUTPATIENT
Start: 2024-04-21 | End: 2024-04-21

## 2024-04-21 RX ORDER — MIDODRINE HYDROCHLORIDE 5 MG/1
5 TABLET ORAL
Status: DISCONTINUED | OUTPATIENT
Start: 2024-04-21 | End: 2024-04-21

## 2024-04-21 RX ORDER — RANOLAZINE 500 MG/1
1000 TABLET, EXTENDED RELEASE ORAL 2 TIMES DAILY
Status: DISCONTINUED | OUTPATIENT
Start: 2024-04-21 | End: 2024-04-21

## 2024-04-21 RX ORDER — ONDANSETRON 2 MG/ML
4 INJECTION INTRAMUSCULAR; INTRAVENOUS EVERY 6 HOURS PRN
Status: DISCONTINUED | OUTPATIENT
Start: 2024-04-21 | End: 2024-04-23 | Stop reason: HOSPADM

## 2024-04-21 RX ORDER — ACETAMINOPHEN 650 MG/1
650 SUPPOSITORY RECTAL EVERY 6 HOURS PRN
Status: DISCONTINUED | OUTPATIENT
Start: 2024-04-21 | End: 2024-04-23 | Stop reason: HOSPADM

## 2024-04-21 RX ADMIN — MORPHINE SULFATE 4 MG: 4 INJECTION, SOLUTION INTRAMUSCULAR; INTRAVENOUS at 20:18

## 2024-04-21 RX ADMIN — MORPHINE SULFATE 4 MG: 4 INJECTION, SOLUTION INTRAMUSCULAR; INTRAVENOUS at 23:26

## 2024-04-21 RX ADMIN — DULOXETINE HYDROCHLORIDE 20 MG: 20 CAPSULE, DELAYED RELEASE ORAL at 18:21

## 2024-04-21 RX ADMIN — ACETAMINOPHEN 650 MG: 325 TABLET ORAL at 18:21

## 2024-04-21 RX ADMIN — Medication 10 ML: at 17:25

## 2024-04-21 RX ADMIN — CARVEDILOL 3.12 MG: 6.25 TABLET, FILM COATED ORAL at 18:20

## 2024-04-21 RX ADMIN — QUETIAPINE FUMARATE 25 MG: 25 TABLET ORAL at 22:18

## 2024-04-21 RX ADMIN — ASPIRIN 162 MG: 81 TABLET, CHEWABLE ORAL at 11:43

## 2024-04-21 RX ADMIN — Medication 10 ML: at 20:30

## 2024-04-21 RX ADMIN — NITROGLYCERIN 0.4 MG: 0.4 TABLET, ORALLY DISINTEGRATING SUBLINGUAL at 16:58

## 2024-04-21 RX ADMIN — MORPHINE SULFATE 2 MG: 2 INJECTION, SOLUTION INTRAMUSCULAR; INTRAVENOUS at 11:43

## 2024-04-21 RX ADMIN — MORPHINE SULFATE 4 MG: 4 INJECTION, SOLUTION INTRAMUSCULAR; INTRAVENOUS at 17:25

## 2024-04-21 RX ADMIN — INSULIN GLARGINE 10 UNITS: 100 INJECTION, SOLUTION SUBCUTANEOUS at 22:18

## 2024-04-21 RX ADMIN — NIFEDIPINE 60 MG: 30 TABLET, EXTENDED RELEASE ORAL at 20:26

## 2024-04-21 RX ADMIN — MORPHINE SULFATE 2 MG: 2 INJECTION, SOLUTION INTRAMUSCULAR; INTRAVENOUS at 13:23

## 2024-04-21 RX ADMIN — DOCUSATE SODIUM 100 MG: 100 CAPSULE, LIQUID FILLED ORAL at 20:27

## 2024-04-21 RX ADMIN — BUSPIRONE HYDROCHLORIDE 5 MG: 5 TABLET ORAL at 20:25

## 2024-04-21 RX ADMIN — SODIUM CHLORIDE, POTASSIUM CHLORIDE, SODIUM LACTATE AND CALCIUM CHLORIDE: 600; 310; 30; 20 INJECTION, SOLUTION INTRAVENOUS at 17:13

## 2024-04-21 RX ADMIN — LEVETIRACETAM 500 MG: 500 TABLET, FILM COATED ORAL at 20:26

## 2024-04-21 RX ADMIN — NITROGLYCERIN 0.4 MG: 0.4 TABLET, ORALLY DISINTEGRATING SUBLINGUAL at 17:05

## 2024-04-21 RX ADMIN — HEPARIN SODIUM 3070 UNITS: 1000 INJECTION INTRAVENOUS; SUBCUTANEOUS at 14:02

## 2024-04-21 RX ADMIN — AMITRIPTYLINE HYDROCHLORIDE 40 MG: 10 TABLET, FILM COATED ORAL at 20:27

## 2024-04-21 RX ADMIN — ONDANSETRON 4 MG: 2 INJECTION INTRAMUSCULAR; INTRAVENOUS at 11:43

## 2024-04-21 RX ADMIN — RANOLAZINE 500 MG: 500 TABLET, FILM COATED, EXTENDED RELEASE ORAL at 20:25

## 2024-04-21 RX ADMIN — HEPARIN SODIUM 610 UNITS/HR: 10000 INJECTION, SOLUTION INTRAVENOUS at 14:06

## 2024-04-21 RX ADMIN — TICAGRELOR 90 MG: 90 TABLET ORAL at 20:26

## 2024-04-21 RX ADMIN — ATORVASTATIN CALCIUM 80 MG: 40 TABLET, FILM COATED ORAL at 20:25

## 2024-04-21 RX ADMIN — NITROGLYCERIN 0.4 MG: 0.4 TABLET, ORALLY DISINTEGRATING SUBLINGUAL at 17:14

## 2024-04-21 ASSESSMENT — PAIN - FUNCTIONAL ASSESSMENT
PAIN_FUNCTIONAL_ASSESSMENT: PREVENTS OR INTERFERES SOME ACTIVE ACTIVITIES AND ADLS
PAIN_FUNCTIONAL_ASSESSMENT: 0-10
PAIN_FUNCTIONAL_ASSESSMENT: ACTIVITIES ARE NOT PREVENTED
PAIN_FUNCTIONAL_ASSESSMENT: PREVENTS OR INTERFERES SOME ACTIVE ACTIVITIES AND ADLS

## 2024-04-21 ASSESSMENT — PAIN DESCRIPTION - LOCATION
LOCATION: CHEST
LOCATION: HEAD
LOCATION: CHEST

## 2024-04-21 ASSESSMENT — PAIN DESCRIPTION - DIRECTION: RADIATING_TOWARDS: L ARM

## 2024-04-21 ASSESSMENT — PAIN SCALES - GENERAL
PAINLEVEL_OUTOF10: 8
PAINLEVEL_OUTOF10: 6
PAINLEVEL_OUTOF10: 3
PAINLEVEL_OUTOF10: 8
PAINLEVEL_OUTOF10: 0
PAINLEVEL_OUTOF10: 6
PAINLEVEL_OUTOF10: 8
PAINLEVEL_OUTOF10: 10
PAINLEVEL_OUTOF10: 6
PAINLEVEL_OUTOF10: 6
PAINLEVEL_OUTOF10: 8
PAINLEVEL_OUTOF10: 5
PAINLEVEL_OUTOF10: 8
PAINLEVEL_OUTOF10: 5

## 2024-04-21 ASSESSMENT — PAIN DESCRIPTION - FREQUENCY: FREQUENCY: CONTINUOUS

## 2024-04-21 ASSESSMENT — PAIN DESCRIPTION - ORIENTATION
ORIENTATION: LEFT
ORIENTATION: ANTERIOR
ORIENTATION: LEFT
ORIENTATION: MID
ORIENTATION: LEFT
ORIENTATION: MID

## 2024-04-21 ASSESSMENT — HEART SCORE: ECG: NON-SPECIFC REPOLARIZATION DISTURBANCE/LBTB/PM

## 2024-04-21 ASSESSMENT — PAIN DESCRIPTION - DESCRIPTORS
DESCRIPTORS: SHARP
DESCRIPTORS: ACHING
DESCRIPTORS: SHARP
DESCRIPTORS: ACHING

## 2024-04-21 ASSESSMENT — PAIN SCALES - WONG BAKER
WONGBAKER_NUMERICALRESPONSE: NO HURT
WONGBAKER_NUMERICALRESPONSE: HURTS A LITTLE BIT

## 2024-04-21 ASSESSMENT — PAIN DESCRIPTION - PAIN TYPE
TYPE: ACUTE PAIN
TYPE: ACUTE PAIN

## 2024-04-21 ASSESSMENT — PAIN DESCRIPTION - ONSET: ONSET: ON-GOING

## 2024-04-21 NOTE — ED PROVIDER NOTES
placed or performed during the hospital encounter of 04/21/24   Comprehensive Metabolic Panel w/ Reflex to MG   Result Value Ref Range    Sodium 139 136 - 145 mmol/L    Potassium reflex Magnesium 3.7 3.5 - 5.1 mmol/L    Chloride 104 99 - 110 mmol/L    CO2 22 21 - 32 mmol/L    Anion Gap 13 3 - 16    Glucose 231 (H) 70 - 99 mg/dL    BUN 17 7 - 20 mg/dL    Creatinine 1.7 (H) 0.6 - 1.2 mg/dL    Est, Glom Filt Rate 33 (A) >60    Calcium 9.4 8.3 - 10.6 mg/dL    Total Protein 7.4 6.4 - 8.2 g/dL    Albumin 3.7 3.4 - 5.0 g/dL    Albumin/Globulin Ratio 1.0 (L) 1.1 - 2.2    Total Bilirubin <0.2 0.0 - 1.0 mg/dL    Alkaline Phosphatase 155 (H) 40 - 129 U/L    ALT 9 (L) 10 - 40 U/L    AST 15 15 - 37 U/L   CBC with Auto Differential   Result Value Ref Range    WBC 5.0 4.0 - 11.0 K/uL    RBC 2.90 (L) 4.00 - 5.20 M/uL    Hemoglobin 9.0 (L) 12.0 - 16.0 g/dL    Hematocrit 27.2 (L) 36.0 - 48.0 %    MCV 93.9 80.0 - 100.0 fL    MCH 30.9 26.0 - 34.0 pg    MCHC 32.9 31.0 - 36.0 g/dL    RDW 16.8 (H) 12.4 - 15.4 %    Platelets 270 135 - 450 K/uL    MPV 7.1 5.0 - 10.5 fL    Neutrophils % 72.9 %    Lymphocytes % 18.5 %    Monocytes % 5.3 %    Eosinophils % 2.5 %    Basophils % 0.8 %    Neutrophils Absolute 3.6 1.7 - 7.7 K/uL    Lymphocytes Absolute 0.9 (L) 1.0 - 5.1 K/uL    Monocytes Absolute 0.3 0.0 - 1.3 K/uL    Eosinophils Absolute 0.1 0.0 - 0.6 K/uL    Basophils Absolute 0.0 0.0 - 0.2 K/uL   Troponin   Result Value Ref Range    Troponin, High Sensitivity 46 (H) 0 - 14 ng/L   Troponin   Result Value Ref Range    Troponin, High Sensitivity 44 (H) 0 - 14 ng/L   APTT   Result Value Ref Range    aPTT 32.4 22.1 - 36.4 sec   EKG 12 Lead   Result Value Ref Range    Ventricular Rate 103 BPM    Atrial Rate 103 BPM    P-R Interval 124 ms    QRS Duration 86 ms    Q-T Interval 352 ms    QTc Calculation (Bazett) 461 ms    P Axis 100 degrees    R Axis 143 degrees    T Axis 44 degrees    Diagnosis        Suspect arm lead reversal, interpretation assumes

## 2024-04-21 NOTE — H&P
Rales/Wheezes/Rhonchi.  Cardiovascular:  Regular rate and rhythm with normal S1/S2 without murmurs, rubs or gallops.  Abdomen: Soft, non-tender, non-distended with normal bowel sounds.  Musculoskeletal:  No clubbing, cyanosis or edema bilaterally.  Full range of motion without deformity.  Skin: Skin color, texture, turgor normal.  No rashes or lesions.  Neurologic:  Neurovascularly intact without any focal sensory/motor deficits. Cranial nerves: II-XII intact, grossly non-focal.  Psychiatric:  Alert and oriented, thought content appropriate, normal insight  Capillary Refill: Brisk,3 seconds, normal  Peripheral Pulses: +2 palpable, equal bilaterally       Labs:     Recent Labs     04/21/24  1100   WBC 5.0   HGB 9.0*   HCT 27.2*        Recent Labs     04/21/24  1100      K 3.7      CO2 22   BUN 17   CREATININE 1.7*   CALCIUM 9.4     Recent Labs     04/21/24  1100   AST 15   ALT 9*   BILITOT <0.2   ALKPHOS 155*     No results for input(s): \"INR\" in the last 72 hours.  Recent Labs     04/21/24  1304 04/21/24  1701 04/21/24  1802   TROPHS 44* 40* 43*       Urinalysis:      Lab Results   Component Value Date/Time    NITRU Negative 04/05/2024 11:35 AM    WBCUA 1 04/05/2024 11:35 AM    BACTERIA None Seen 04/05/2024 11:35 AM    RBCUA 1 04/05/2024 11:35 AM    BLOODU Negative 04/05/2024 11:35 AM    SPECGRAV 1.017 04/05/2024 11:35 AM    GLUCOSEU Negative 04/05/2024 11:35 AM    GLUCOSEU NEGATIVE 05/14/2012 03:29 PM       Radiology:     CXR: I have reviewed the CXR.  EKG:  I have reviewed the EKG.     XR CHEST (2 VW)   Final Result   No acute process.             Consults:    IP CONSULT TO CARDIOLOGY  IP CONSULT TO SPIRITUAL SERVICES  IP CONSULT TO SOCIAL WORK    ASSESSMENT:    Active Hospital Problems    Diagnosis Date Noted    Chest pain [R07.9] 02/17/2023     Priority: Medium     Chest pain retrosternal recurrent  Elevated troponin  Concern for non-STEMI  Coronary artery disease status post PTCA, multiple

## 2024-04-21 NOTE — FLOWSHEET NOTE
04/21/24 1742   Treatment Team Notification   Reason for Communication   (C/O [facility] CHEST PAIN RATED 8/10 WITH NO IMPROVEMENT AFTER SL NITRO X3 DOSES. ADMINISTERED MORPHINE 4 MG APPROX 15 MINUTES AGO AND PAIN IMPROVED TO 6/10, PATIENT STATES TOLERABLE NOW.)   Name of Team Member Notified DR. FUENTES LOPEZ   Treatment Team Role Attending Provider   Method of Communication Secure Message   Response Waiting for response   Notification Time 2816     04/21/2024 @ 4009: Dr. Shreyas Lopez messaged this RN via [employer] and ordered EKG. EKG completed and placed in patient chart. Dr. Shreyas Lopez notified. See EKG.

## 2024-04-21 NOTE — ED NOTES
Late entry: lab resulted, pharmacist called that heparin gtt and bolus ok to start. Heparin checked and verified per 2 Rns and started. Pt in bed and has no needs at this time.

## 2024-04-21 NOTE — ED NOTES
RN called report to 5 west RN. Pt requesting pain medication, but order parameters for pain medication not met, ED RN with perfect serve to admitting MD. ED RN updated PCU RN. Lab requested to come and draw blood, as pt is hard stick. SBAR to PCU RN.

## 2024-04-22 LAB
ALBUMIN SERPL-MCNC: 3.3 G/DL (ref 3.4–5)
ALBUMIN/GLOB SERPL: 0.8 {RATIO} (ref 1.1–2.2)
ALP SERPL-CCNC: 150 U/L (ref 40–129)
ALT SERPL-CCNC: 8 U/L (ref 10–40)
ANION GAP SERPL CALCULATED.3IONS-SCNC: 13 MMOL/L (ref 3–16)
ANTI-XA UNFRAC HEPARIN: 0.4 IU/ML (ref 0.3–0.7)
ANTI-XA UNFRAC HEPARIN: 0.42 IU/ML (ref 0.3–0.7)
AST SERPL-CCNC: 19 U/L (ref 15–37)
BILIRUB SERPL-MCNC: 0.3 MG/DL (ref 0–1)
BUN SERPL-MCNC: 14 MG/DL (ref 7–20)
CALCIUM SERPL-MCNC: 9.2 MG/DL (ref 8.3–10.6)
CHLORIDE SERPL-SCNC: 107 MMOL/L (ref 99–110)
CHOLEST SERPL-MCNC: 175 MG/DL (ref 0–199)
CO2 SERPL-SCNC: 21 MMOL/L (ref 21–32)
CREAT SERPL-MCNC: 1.2 MG/DL (ref 0.6–1.2)
DEPRECATED RDW RBC AUTO: 16.6 % (ref 12.4–15.4)
EKG ATRIAL RATE: 103 BPM
EKG ATRIAL RATE: 79 BPM
EKG DIAGNOSIS: NORMAL
EKG DIAGNOSIS: NORMAL
EKG P AXIS: -15 DEGREES
EKG P AXIS: 100 DEGREES
EKG P-R INTERVAL: 104 MS
EKG P-R INTERVAL: 124 MS
EKG Q-T INTERVAL: 352 MS
EKG Q-T INTERVAL: 382 MS
EKG QRS DURATION: 86 MS
EKG QRS DURATION: 90 MS
EKG QTC CALCULATION (BAZETT): 438 MS
EKG QTC CALCULATION (BAZETT): 461 MS
EKG R AXIS: 143 DEGREES
EKG R AXIS: 41 DEGREES
EKG T AXIS: 177 DEGREES
EKG T AXIS: 44 DEGREES
EKG VENTRICULAR RATE: 103 BPM
EKG VENTRICULAR RATE: 79 BPM
EST. AVERAGE GLUCOSE BLD GHB EST-MCNC: 162.8 MG/DL
GFR SERPLBLD CREATININE-BSD FMLA CKD-EPI: 49 ML/MIN/{1.73_M2}
GLUCOSE BLD-MCNC: 104 MG/DL (ref 70–99)
GLUCOSE BLD-MCNC: 107 MG/DL (ref 70–99)
GLUCOSE BLD-MCNC: 161 MG/DL (ref 70–99)
GLUCOSE BLD-MCNC: 169 MG/DL (ref 70–99)
GLUCOSE SERPL-MCNC: 175 MG/DL (ref 70–99)
HBA1C MFR BLD: 7.3 %
HCT VFR BLD AUTO: 27 % (ref 36–48)
HDLC SERPL-MCNC: 58 MG/DL (ref 40–60)
HGB BLD-MCNC: 8.8 G/DL (ref 12–16)
LACTATE BLDV-SCNC: 1.1 MMOL/L (ref 0.4–2)
LDLC SERPL CALC-MCNC: 90 MG/DL
MCH RBC QN AUTO: 30.5 PG (ref 26–34)
MCHC RBC AUTO-ENTMCNC: 32.4 G/DL (ref 31–36)
MCV RBC AUTO: 93.9 FL (ref 80–100)
PERFORMED ON: ABNORMAL
PLATELET # BLD AUTO: 249 K/UL (ref 135–450)
PMV BLD AUTO: 7.2 FL (ref 5–10.5)
POTASSIUM SERPL-SCNC: 3.9 MMOL/L (ref 3.5–5.1)
PROT SERPL-MCNC: 7.2 G/DL (ref 6.4–8.2)
RBC # BLD AUTO: 2.88 M/UL (ref 4–5.2)
SODIUM SERPL-SCNC: 141 MMOL/L (ref 136–145)
TRIGL SERPL-MCNC: 134 MG/DL (ref 0–150)
VLDLC SERPL CALC-MCNC: 27 MG/DL
WBC # BLD AUTO: 3.5 K/UL (ref 4–11)

## 2024-04-22 PROCEDURE — 6370000000 HC RX 637 (ALT 250 FOR IP): Performed by: INTERNAL MEDICINE

## 2024-04-22 PROCEDURE — 6360000002 HC RX W HCPCS: Performed by: REGISTERED NURSE

## 2024-04-22 PROCEDURE — 83036 HEMOGLOBIN GLYCOSYLATED A1C: CPT

## 2024-04-22 PROCEDURE — 80061 LIPID PANEL: CPT

## 2024-04-22 PROCEDURE — 93010 ELECTROCARDIOGRAM REPORT: CPT | Performed by: INTERNAL MEDICINE

## 2024-04-22 PROCEDURE — 83605 ASSAY OF LACTIC ACID: CPT

## 2024-04-22 PROCEDURE — 85027 COMPLETE CBC AUTOMATED: CPT

## 2024-04-22 PROCEDURE — 80053 COMPREHEN METABOLIC PANEL: CPT

## 2024-04-22 PROCEDURE — 2580000003 HC RX 258: Performed by: INTERNAL MEDICINE

## 2024-04-22 PROCEDURE — 1200000000 HC SEMI PRIVATE

## 2024-04-22 PROCEDURE — 9990000010 HC NO CHARGE VISIT: Performed by: PHYSICAL THERAPIST

## 2024-04-22 PROCEDURE — 85520 HEPARIN ASSAY: CPT

## 2024-04-22 PROCEDURE — 6370000000 HC RX 637 (ALT 250 FOR IP): Performed by: REGISTERED NURSE

## 2024-04-22 PROCEDURE — 94760 N-INVAS EAR/PLS OXIMETRY 1: CPT

## 2024-04-22 PROCEDURE — 36415 COLL VENOUS BLD VENIPUNCTURE: CPT

## 2024-04-22 PROCEDURE — 99223 1ST HOSP IP/OBS HIGH 75: CPT | Performed by: INTERNAL MEDICINE

## 2024-04-22 RX ORDER — DIPHENHYDRAMINE HCL 25 MG
25 TABLET ORAL EVERY 8 HOURS PRN
Status: DISCONTINUED | OUTPATIENT
Start: 2024-04-22 | End: 2024-04-23 | Stop reason: HOSPADM

## 2024-04-22 RX ORDER — ENOXAPARIN SODIUM 100 MG/ML
30 INJECTION SUBCUTANEOUS DAILY
Status: DISCONTINUED | OUTPATIENT
Start: 2024-04-23 | End: 2024-04-23 | Stop reason: HOSPADM

## 2024-04-22 RX ADMIN — MORPHINE SULFATE 4 MG: 4 INJECTION, SOLUTION INTRAMUSCULAR; INTRAVENOUS at 14:57

## 2024-04-22 RX ADMIN — DIPHENHYDRAMINE HCL 25 MG: 25 TABLET ORAL at 17:52

## 2024-04-22 RX ADMIN — MORPHINE SULFATE 4 MG: 4 INJECTION, SOLUTION INTRAMUSCULAR; INTRAVENOUS at 04:32

## 2024-04-22 RX ADMIN — PANTOPRAZOLE SODIUM 40 MG: 40 TABLET, DELAYED RELEASE ORAL at 05:04

## 2024-04-22 RX ADMIN — ATORVASTATIN CALCIUM 80 MG: 40 TABLET, FILM COATED ORAL at 20:36

## 2024-04-22 RX ADMIN — SODIUM CHLORIDE, POTASSIUM CHLORIDE, SODIUM LACTATE AND CALCIUM CHLORIDE: 600; 310; 30; 20 INJECTION, SOLUTION INTRAVENOUS at 03:25

## 2024-04-22 RX ADMIN — NIFEDIPINE 60 MG: 30 TABLET, EXTENDED RELEASE ORAL at 08:19

## 2024-04-22 RX ADMIN — QUETIAPINE FUMARATE 25 MG: 25 TABLET ORAL at 20:36

## 2024-04-22 RX ADMIN — INSULIN GLARGINE 10 UNITS: 100 INJECTION, SOLUTION SUBCUTANEOUS at 20:37

## 2024-04-22 RX ADMIN — DOCUSATE SODIUM 100 MG: 100 CAPSULE, LIQUID FILLED ORAL at 08:20

## 2024-04-22 RX ADMIN — CARVEDILOL 3.12 MG: 6.25 TABLET, FILM COATED ORAL at 08:20

## 2024-04-22 RX ADMIN — CARVEDILOL 3.12 MG: 6.25 TABLET, FILM COATED ORAL at 17:52

## 2024-04-22 RX ADMIN — DOCUSATE SODIUM 100 MG: 100 CAPSULE, LIQUID FILLED ORAL at 20:36

## 2024-04-22 RX ADMIN — NIFEDIPINE 60 MG: 30 TABLET, EXTENDED RELEASE ORAL at 20:36

## 2024-04-22 RX ADMIN — BUSPIRONE HYDROCHLORIDE 5 MG: 5 TABLET ORAL at 20:36

## 2024-04-22 RX ADMIN — RANOLAZINE 500 MG: 500 TABLET, FILM COATED, EXTENDED RELEASE ORAL at 20:36

## 2024-04-22 RX ADMIN — MORPHINE SULFATE 4 MG: 4 INJECTION, SOLUTION INTRAMUSCULAR; INTRAVENOUS at 08:20

## 2024-04-22 RX ADMIN — BUSPIRONE HYDROCHLORIDE 5 MG: 5 TABLET ORAL at 08:20

## 2024-04-22 RX ADMIN — ASPIRIN 81 MG: 81 TABLET, CHEWABLE ORAL at 08:20

## 2024-04-22 RX ADMIN — Medication 10 ML: at 08:22

## 2024-04-22 RX ADMIN — MORPHINE SULFATE 4 MG: 4 INJECTION, SOLUTION INTRAMUSCULAR; INTRAVENOUS at 11:48

## 2024-04-22 RX ADMIN — DULOXETINE HYDROCHLORIDE 20 MG: 20 CAPSULE, DELAYED RELEASE ORAL at 08:20

## 2024-04-22 RX ADMIN — TICAGRELOR 90 MG: 90 TABLET ORAL at 20:36

## 2024-04-22 RX ADMIN — LEVETIRACETAM 500 MG: 500 TABLET, FILM COATED ORAL at 20:36

## 2024-04-22 RX ADMIN — AMITRIPTYLINE HYDROCHLORIDE 40 MG: 10 TABLET, FILM COATED ORAL at 20:35

## 2024-04-22 RX ADMIN — MORPHINE SULFATE 4 MG: 4 INJECTION, SOLUTION INTRAMUSCULAR; INTRAVENOUS at 20:36

## 2024-04-22 RX ADMIN — TICAGRELOR 90 MG: 90 TABLET ORAL at 08:20

## 2024-04-22 RX ADMIN — Medication 10 ML: at 20:37

## 2024-04-22 RX ADMIN — RANOLAZINE 500 MG: 500 TABLET, FILM COATED, EXTENDED RELEASE ORAL at 08:20

## 2024-04-22 RX ADMIN — LEVETIRACETAM 500 MG: 500 TABLET, FILM COATED ORAL at 08:20

## 2024-04-22 ASSESSMENT — PAIN DESCRIPTION - ORIENTATION
ORIENTATION: MID;LEFT
ORIENTATION: LEFT
ORIENTATION: MID;LEFT
ORIENTATION: LEFT
ORIENTATION: MID;LEFT

## 2024-04-22 ASSESSMENT — PAIN SCALES - GENERAL
PAINLEVEL_OUTOF10: 0
PAINLEVEL_OUTOF10: 10
PAINLEVEL_OUTOF10: 8

## 2024-04-22 ASSESSMENT — PAIN DESCRIPTION - LOCATION
LOCATION: CHEST;LEG
LOCATION: SHOULDER;CHEST
LOCATION: CHEST;LEG
LOCATION: CHEST
LOCATION: CHEST;ARM
LOCATION: CHEST;ARM

## 2024-04-22 ASSESSMENT — PAIN DESCRIPTION - DESCRIPTORS
DESCRIPTORS: ACHING
DESCRIPTORS: ACHING;HEAVINESS;PRESSURE
DESCRIPTORS: HEAVINESS
DESCRIPTORS: ACHING
DESCRIPTORS: HEAVINESS;PRESSURE

## 2024-04-22 NOTE — PROCEDURES
Slept well. Medicated for intermittent chest pain. Patient reports that pain medication was effective but pain returns after a few hours. Message sent to MD last evening regarding home medications. New orders noted. Up to BSC with SBA. Tolerated well. Will continue to monitor.

## 2024-04-22 NOTE — CARE COORDINATION
Case Management Assessment  Initial Evaluation    Date/Time of Evaluation: 4/22/2024 1:01 PM  Assessment Completed by: Trixie Baires RN    If patient is discharged prior to next notation, then this note serves as note for discharge by case management.    Patient Name: Maren Meza                   YOB: 1955  Diagnosis: Chest pain [R07.9]  Hx of coronary artery disease [Z86.79]  Chest pain, unspecified type [R07.9]                   Date / Time: 4/21/2024 10:54 AM    Patient Admission Status: Inpatient   Readmission Risk (Low < 19, Mod (19-27), High > 27): Readmission Risk Score: 18    Current PCP: Marin Cardenas MD  PCP verified by CM? Yes (Cj)    Chart Reviewed: Yes      History Provided by: Patient  Patient Orientation: Alert and Oriented    Patient Cognition: Alert    Hospitalization in the last 30 days (Readmission):  Yes    If yes, Readmission Assessment in  Navigator will be completed.    Advance Directives:      Code Status: Full Code   Patient's Primary Decision Maker is: Named in Scanned ACP Document    Primary Decision Maker: Neida Fisher - Child - [phone]    Secondary Decision Maker: Ester Jenkins - Child - [phone]    Secondary Decision Maker: Nilesh Meza - Child - [phone]    Discharge Planning:    Patient lives with: Alone Type of Home: Apartment  Primary Care Giver: Self  Patient Support Systems include: Children, Family Members   Current Financial resources: Medicare, Medicaid  Current community resources: ECF/Home Care  Current services prior to admission: Meals On Wheels, Other (Comment), RYAN/Passport (HHA-5d/wk x 5hr/day)            Current DME: Walker            Type of Home Care services:  Nursing Services, PT    ADLS  Prior functional level: Cooking, Housework, Shopping, Mobility, Assistance with the following:  Current functional level: Assistance with the following:, Cooking, Housework, Shopping, Mobility    PT AM-PAC:   pending/24  OT

## 2024-04-22 NOTE — CARE COORDINATION
04/22/24 1254   Readmission Assessment   Number of Days since last admission? 8-30 days   Previous Disposition Home with Home Health  (Formerly [facility])   Who is being Interviewed Patient   What was the patient's/caregiver's perception as to why they think they needed to return back to the hospital? Other (Comment)  (chest pain)   Did you visit your Primary Care Physician after you left the hospital, before you returned this time? No   Why weren't you able to visit your PCP? Did not have an appointment   Did you see a specialist, such as Cardiac, Pulmonary, Orthopedic Physician, etc. after you left the hospital? Yes  (cardiology)   Who advised the patient to return to the hospital? Self-referral   Does the patient report anything that got in the way of taking their medications? No   In our efforts to provide the best possible care to you and others like you, can you think of anything that we could have done to help you after you left the hospital the first time, so that you might not have needed to return so soon? Other (Comment)  (no)     #[phone]  Electronically signed by Trixie Baires RN on 4/22/2024 at 12:54 PM

## 2024-04-22 NOTE — CONSULTS
vial 10 Units  10 Units SubCUTAneous Nightly Elizabeth Humphrey APRN - CNP   10 Units at 04/21/24 2218    QUEtiapine (SEROQUEL) tablet 25 mg  25 mg Oral Nightly Elizabeth Humphrey APRN - CNP   25 mg at 04/21/24 2218       Physical Exam:   /72   Pulse 70   Temp 98 °F (36.7 °C) (Oral)   Resp 16   Ht 1.524 m (5')   Wt 52.6 kg (115 lb 15.4 oz)   SpO2 97%   BMI 22.65 kg/m²     Intake/Output Summary (Last 24 hours) at 4/22/2024 0854  Last data filed at 4/22/2024 0822  Gross per 24 hour   Intake 10 ml   Output 600 ml   Net -590 ml     Wt Readings from Last 2 Encounters:   04/22/24 52.6 kg (115 lb 15.4 oz)   04/05/24 52.2 kg (115 lb 1.3 oz)     Constitutional: She is oriented to person, place, and time. She appears well-developed and well-nourished. In no acute distress.   Head: Normocephalic and atraumatic.     Neck: Neck supple. No JVD present. Carotid bruit is not present. No mass and no thyromegaly present. No lymphadenopathy present.  Cardiovascular: Normal rate, regular rhythm, normal heart sounds and intact distal pulses.  Exam reveals no gallop and no friction rub.  No murmur heard.  Pulmonary/Chest: Effort normal and breath sounds normal. No respiratory distress. She has no wheezes, rhonchi or rales.   Abdominal: Soft, non-tender. Bowel sounds and aorta are normal. She exhibits no organomegaly, mass or bruit.   Extremities: No edema, cyanosis, or clubbing. Pulses are 2+ radial/carotid/dorsalis pedis and posterior tibial bilaterally.  Neurological: She is alert and oriented to person, place, and time. She has normal reflexes. No cranial nerve deficit. Coordination normal.   Skin: Skin is warm and dry. There is no rash or diaphoresis.   Psychiatric: She has a normal mood and affect. Her speech is normal and behavior is normal.     Personally reviewed and interpreted   EKG Interpretation: Sinus rhythm with nonspecific lateral ST changes in leads V5 and V6, improved on most recent ECG.     Lab Review:   Lab

## 2024-04-22 NOTE — ACP (ADVANCE CARE PLANNING)
Conversation Outcomes:  ACP discussion completed    Follow-up plan:    [] Schedule follow-up conversation to continue planning  [] Referred individual to Provider for additional questions/concerns   [] Advised patient/agent/surrogate to review completed ACP document and update if needed with changes in condition, patient preferences or care setting    [x] This note routed to one or more involved healthcare providers    #[phone]  Electronically signed by Trixie Baires RN on 4/22/2024 at 12:55 PM

## 2024-04-23 VITALS
BODY MASS INDEX: 22.94 KG/M2 | HEIGHT: 60 IN | RESPIRATION RATE: 17 BRPM | TEMPERATURE: 99 F | SYSTOLIC BLOOD PRESSURE: 137 MMHG | OXYGEN SATURATION: 100 % | WEIGHT: 116.84 LBS | DIASTOLIC BLOOD PRESSURE: 55 MMHG | HEART RATE: 73 BPM

## 2024-04-23 LAB
EST. AVERAGE GLUCOSE BLD GHB EST-MCNC: 162.8 MG/DL
GLUCOSE BLD-MCNC: 151 MG/DL (ref 70–99)
HBA1C MFR BLD: 7.3 %
PERFORMED ON: ABNORMAL

## 2024-04-23 PROCEDURE — 97535 SELF CARE MNGMENT TRAINING: CPT

## 2024-04-23 PROCEDURE — 6360000002 HC RX W HCPCS: Performed by: REGISTERED NURSE

## 2024-04-23 PROCEDURE — 97161 PT EVAL LOW COMPLEX 20 MIN: CPT | Performed by: PHYSICAL THERAPIST

## 2024-04-23 PROCEDURE — 6360000002 HC RX W HCPCS: Performed by: INTERNAL MEDICINE

## 2024-04-23 PROCEDURE — 2580000003 HC RX 258: Performed by: INTERNAL MEDICINE

## 2024-04-23 PROCEDURE — 6370000000 HC RX 637 (ALT 250 FOR IP): Performed by: INTERNAL MEDICINE

## 2024-04-23 PROCEDURE — 97165 OT EVAL LOW COMPLEX 30 MIN: CPT

## 2024-04-23 PROCEDURE — 97116 GAIT TRAINING THERAPY: CPT | Performed by: PHYSICAL THERAPIST

## 2024-04-23 RX ADMIN — ENOXAPARIN SODIUM 30 MG: 100 INJECTION SUBCUTANEOUS at 10:07

## 2024-04-23 RX ADMIN — Medication 10 ML: at 10:08

## 2024-04-23 RX ADMIN — MORPHINE SULFATE 4 MG: 4 INJECTION, SOLUTION INTRAMUSCULAR; INTRAVENOUS at 10:07

## 2024-04-23 RX ADMIN — MORPHINE SULFATE 2 MG: 2 INJECTION, SOLUTION INTRAMUSCULAR; INTRAVENOUS at 00:24

## 2024-04-23 RX ADMIN — RANOLAZINE 500 MG: 500 TABLET, FILM COATED, EXTENDED RELEASE ORAL at 10:06

## 2024-04-23 RX ADMIN — BUSPIRONE HYDROCHLORIDE 5 MG: 5 TABLET ORAL at 10:06

## 2024-04-23 RX ADMIN — CARVEDILOL 3.12 MG: 6.25 TABLET, FILM COATED ORAL at 10:06

## 2024-04-23 RX ADMIN — LEVETIRACETAM 500 MG: 500 TABLET, FILM COATED ORAL at 10:05

## 2024-04-23 RX ADMIN — DOCUSATE SODIUM 100 MG: 100 CAPSULE, LIQUID FILLED ORAL at 10:08

## 2024-04-23 RX ADMIN — MORPHINE SULFATE 4 MG: 4 INJECTION, SOLUTION INTRAMUSCULAR; INTRAVENOUS at 03:23

## 2024-04-23 RX ADMIN — PANTOPRAZOLE SODIUM 40 MG: 40 TABLET, DELAYED RELEASE ORAL at 06:09

## 2024-04-23 RX ADMIN — ONDANSETRON 4 MG: 4 TABLET, ORALLY DISINTEGRATING ORAL at 10:06

## 2024-04-23 RX ADMIN — ASPIRIN 81 MG: 81 TABLET, CHEWABLE ORAL at 10:06

## 2024-04-23 RX ADMIN — NIFEDIPINE 60 MG: 30 TABLET, EXTENDED RELEASE ORAL at 10:06

## 2024-04-23 RX ADMIN — TICAGRELOR 90 MG: 90 TABLET ORAL at 10:06

## 2024-04-23 RX ADMIN — DULOXETINE HYDROCHLORIDE 20 MG: 20 CAPSULE, DELAYED RELEASE ORAL at 10:05

## 2024-04-23 ASSESSMENT — PAIN DESCRIPTION - ORIENTATION
ORIENTATION: LEFT
ORIENTATION: MID;LEFT
ORIENTATION: LEFT

## 2024-04-23 ASSESSMENT — PAIN DESCRIPTION - LOCATION
LOCATION: CHEST;SHOULDER

## 2024-04-23 ASSESSMENT — PAIN SCALES - GENERAL
PAINLEVEL_OUTOF10: 6
PAINLEVEL_OUTOF10: 9
PAINLEVEL_OUTOF10: 8
PAINLEVEL_OUTOF10: 6

## 2024-04-23 ASSESSMENT — PAIN DESCRIPTION - DESCRIPTORS
DESCRIPTORS: ACHING
DESCRIPTORS: ACHING
DESCRIPTORS: HEAVINESS

## 2024-04-23 NOTE — CARE COORDINATION
Case Management Discharge Note          Date / Time of Note: 4/23/2024 11:13 AM                  Patient Name: Maren Meza   YOB: 1955  Diagnosis: Chest pain [R07.9]  Hx of coronary artery disease [Z86.79]  Chest pain, unspecified type [R07.9]   Date / Time: 4/21/2024 10:54 AM    Financial:  Payor: [employer] [place] / Plan: [employer] [place] DUAL / Product Type: *No Product type* /      Pharmacy:    [facility] - [facility] [address] [phone] - F [phone]  [facility] [address]  Phone: [phone] Fax: [phone]    [facility] [address] [facility] [phone] - F [phone]  [address] [facility] [address]  Phone: [phone] Fax: [phone]    [facility] [address] [phone] - F [phone]  [address] [facility] [address]  Phone: [phone] Fax: [phone]    [id number] [place], [place] - [address] -  [phone] - F [phone]  [address] [facility] [facility] [address]  Phone: [phone] Fax: [phone]      Assistance purchasing medications?: Potential Assistance Purchasing Medications: No  Assistance provided by Case Management: None at this time    DISCHARGE Disposition: Home with Home Health Care    Home Care:  Home Care ordered at discharge: Yes  Home Care Agency: [facility]  Phone: [phone]  Fax: [phone]  Orders faxed: Yes    Transportation:  Transportation PLAN for discharge: family   Mode of Transport: Private Car  Time of Transport: TBD    IMM Completed:   Not Indicated    Additional CM Notes:   DC order noted.  [facility] to follow for home care.  Family to transport.      The Plan for Transition of Care is related to the following treatment goals of Chest pain [R07.9]  Hx of coronary artery disease [Z86.79]  Chest pain, unspecified type [R07.9]    The Patient

## 2024-04-23 NOTE — PLAN OF CARE
Problem: Discharge Planning  Goal: Discharge to home or other facility with appropriate resources  4/22/2024 0203 by Keny Adams RN  Outcome: Progressing     Problem: Pain  Goal: Verbalizes/displays adequate comfort level or baseline comfort level  4/22/2024 0203 by Keny Adams RN  Outcome: Progressing     Problem: ABCDS Injury Assessment  Goal: Absence of physical injury  4/22/2024 0203 by Keny Adams RN  Outcome: Progressing     Problem: Safety - Adult  Goal: Free from fall injury  4/22/2024 0203 by Keny Adams RN  Outcome: Progressing     Problem: Cardiovascular - Adult  Goal: Maintains optimal cardiac output and hemodynamic stability  Outcome: Progressing     Problem: Cardiovascular - Adult  Goal: Absence of cardiac dysrhythmias or at baseline  Outcome: Progressing     
  Problem: Discharge Planning  Goal: Discharge to home or other facility with appropriate resources  4/23/2024 0103 by Gillian Shearer RN  Outcome: Progressing  4/22/2024 1452 by Maria Esther Knox RN  Outcome: Progressing  Flowsheets  Taken 4/22/2024 1452  Discharge to home or other facility with appropriate resources:   Identify barriers to discharge with patient and caregiver   Identify discharge learning needs (meds, wound care, etc)   Arrange for needed discharge resources and transportation as appropriate  Taken 4/22/2024 0838  Discharge to home or other facility with appropriate resources:   Identify barriers to discharge with patient and caregiver   Arrange for needed discharge resources and transportation as appropriate   Identify discharge learning needs (meds, wound care, etc)     Problem: Pain  Goal: Verbalizes/displays adequate comfort level or baseline comfort level  4/23/2024 0103 by Gillian Shearer RN  Outcome: Progressing  4/22/2024 1452 by Maria Esther Knox RN  Outcome: Progressing  Flowsheets (Taken 4/22/2024 1452)  Verbalizes/displays adequate comfort level or baseline comfort level:   Encourage patient to monitor pain and request assistance   Administer analgesics based on type and severity of pain and evaluate response   Assess pain using appropriate pain scale     Problem: ABCDS Injury Assessment  Goal: Absence of physical injury  4/23/2024 0103 by Gillian Shearer RN  Outcome: Progressing  4/22/2024 1452 by Maria Esther Knox RN  Outcome: Progressing  Flowsheets (Taken 4/22/2024 1452)  Absence of Physical Injury: Implement safety measures based on patient assessment     Problem: Safety - Adult  Goal: Free from fall injury  4/23/2024 0103 by Gillian Shearer RN  Outcome: Progressing  4/22/2024 1452 by Maria Esther Knox RN  Outcome: Progressing  Flowsheets (Taken 4/22/2024 1452)  Free From Fall Injury: Instruct family/caregiver on patient safety     Problem: Cardiovascular - Adult  Goal: Maintains optimal cardiac 
  Problem: Discharge Planning  Goal: Discharge to home or other facility with appropriate resources  4/23/2024 1103 by Maria Esther Knox RN  Outcome: Completed  Flowsheets (Taken 4/23/2024 1034)  Discharge to home or other facility with appropriate resources:   Identify barriers to discharge with patient and caregiver   Arrange for needed discharge resources and transportation as appropriate   Identify discharge learning needs (meds, wound care, etc)  4/23/2024 0103 by Gillian Shearer RN  Outcome: Progressing     Problem: Pain  Goal: Verbalizes/displays adequate comfort level or baseline comfort level  4/23/2024 1103 by Maria Esther Knox RN  Outcome: Completed  4/23/2024 0103 by Gillian Shearer RN  Outcome: Progressing     Problem: ABCDS Injury Assessment  Goal: Absence of physical injury  4/23/2024 1103 by Maria Esther Knox RN  Outcome: Completed  4/23/2024 0103 by Gillian Shearer RN  Outcome: Progressing     Problem: Safety - Adult  Goal: Free from fall injury  4/23/2024 1103 by Maria Esther Knox RN  Outcome: Completed  4/23/2024 0103 by Gillian Shearer RN  Outcome: Progressing     Problem: Cardiovascular - Adult  Goal: Maintains optimal cardiac output and hemodynamic stability  4/23/2024 1103 by Maria Esther Knox RN  Outcome: Completed  Flowsheets (Taken 4/23/2024 1034)  Maintains optimal cardiac output and hemodynamic stability: Monitor blood pressure and heart rate  4/23/2024 0103 by Gillian Shearer RN  Outcome: Progressing  Goal: Absence of cardiac dysrhythmias or at baseline  4/23/2024 1103 by Maria Esther Knox RN  Outcome: Completed  Flowsheets (Taken 4/23/2024 1034)  Absence of cardiac dysrhythmias or at baseline: Monitor cardiac rate and rhythm  4/23/2024 0103 by Gillian Shearer RN  Outcome: Progressing     Problem: Neurosensory - Adult  Goal: Achieves stable or improved neurological status  4/23/2024 1103 by Maria Esther Knox RN  Outcome: Completed  Flowsheets (Taken 4/23/2024 1034)  Achieves stable or improved neurological status: 
  Problem: Discharge Planning  Goal: Discharge to home or other facility with appropriate resources  Outcome: Progressing     Problem: Pain  Goal: Verbalizes/displays adequate comfort level or baseline comfort level  Outcome: Progressing     Problem: ABCDS Injury Assessment  Goal: Absence of physical injury  Outcome: Progressing     Problem: Safety - Adult  Goal: Free from fall injury  Outcome: Progressing     
blood cell count     Problem: Metabolic/Fluid and Electrolytes - Adult  Goal: Electrolytes maintained within normal limits  Outcome: Progressing  Flowsheets (Taken 4/22/2024 1452)  Electrolytes maintained within normal limits:   Monitor labs and assess patient for signs and symptoms of electrolyte imbalances   Monitor response to electrolyte replacements, including repeat lab results as appropriate   Administer electrolyte replacement as ordered  Goal: Glucose maintained within prescribed range  Outcome: Progressing  Flowsheets (Taken 4/22/2024 1452)  Glucose maintained within prescribed range:   Monitor blood glucose as ordered   Administer ordered medications to maintain glucose within target range   Assess for signs and symptoms of hyperglycemia and hypoglycemia     Problem: Hematologic - Adult  Goal: Maintains hematologic stability  Outcome: Progressing  Flowsheets (Taken 4/22/2024 1452)  Maintains hematologic stability: Assess for signs and symptoms of bleeding or hemorrhage

## 2024-04-23 NOTE — PROGRESS NOTES
04/22/24 0921   Encounter Summary   Encounter Overview/Reason  Spiritual/Emotional Needs   Service Provided For: Patient   Referral/Consult From: Patient   Last Encounter  04/22/24  ( visited and prayed with pt)   Complexity of Encounter Low   Begin Time 0900   End Time  0921   Total Time Calculated 21 min   Spiritual/Emotional needs   Type Spiritual Support   Assessment/Intervention/Outcome   Assessment Coping;Hopeful   Intervention Active listening;Nurtured Hope;Prayer (assurance of)/[place]   Outcome Comfort;Encouraged;Expressed Gratitude       
  Heart Failure Diet Education:    Per hospital protocol or consult, patient being seen for Heart Failure diet guidelines.    Learners: [x]Patient []Family []Caregiver [] Other: ______________  Methods: [x]Verbal Education  [x]Handouts  []Teachback     Patient's Lifestyle Questions:   Is HF a new Diagnosis? []Yes [x]No    Has patient had prior education? []Yes []No Pt uncertain, but feedback revealed knowledge of diet    Who does Grocery shopping and cooking? pt      Typical Eating Habits:  Pt reported that she tries to follow a low sodium / carb modified diet, except when experiencing altered GI symptoms. Pt will often skip meals      Instructed the Patient on:   [x] High/Low Sodium foods    [] Moderation/portion control  [] Eating out  [x] Fluid Restriction    [] Label reading  [] Carb Counting   [] Other:      Educational Materials Provided:   [x] Nutrition Care Manual (NCM) Heart Failure Nutrition Therapy   [x] NCM Sodium (Salt) Content of Foods  [x] NCM Sodium Free Flavoring Tips    [] Heart Healthy Eating Label Reading Tips   [] Healthy Eating when Eating Out  [x] Controlling Your Fluids   [] Fast Food Guide (from [employer] [place])  [] NCM Carbohydrate Counting for People with Diabetes   [x] Managing Your Diabetes Plate Method     -Diet Recommendations: CCC (4) /2000 mg Sodium/day, 64 oz Fluids/day         Monitoring/Evaluation:   -Barriers: strict compliance  -Evaluation of education: Indicates understanding.  -Expected compliance: fair.      Additional Comments: Discussed importance of following diet prescribed. Reinforced use of carb friendly supplements like Glucerna when GI symptoms flare up, to help ensure adequacy. Feedback revealed that pt familiar with diet therapy for CHF and DM but does not always follow diet prescribed.     Total time involved in patient education: 15 minutes        Electronically signed by Wilmar Leos RD, LD on 4/22/2024 at 5:26 PM               
(1207) Patient continuing to complain of continuous 8-10/10 chest pain that radiates to left shoulder and arm. PRN IV Morphine being given Q3 hours. MD Tao aware. Cardiology still has yet to see patient. RN telephoned [facility] and spoke with Yana, who stated that Cardiology is very familiar with patient. MD Jaime to see patient shortly.     (8347) MD Jaime seen patient. No new interventions or orders. Cardiology signed off. Heparin Drip discontinued. RN updated patient on the POC. Patient verbalized understanding.    Patient still complains of continuous chest pain that radiates to left shoulder and arm. PRN IV Morphine is being given.     Patient ambulates very well independently. Lovenox ordered by MD Tao since discontinuation of Heparin Drip. Patient is currently lying comfortably in bed watching television. Call button within reach, bed in lowest position. Patient calls appropriately. Care ongoing.   
4 Eyes Skin Assessment     NAME:  Maren Meza  YOB: 1955  MEDICAL RECORD NUMBER:  [id number]    The patient is being assessed for  Admission    I agree that at least one RN has performed a thorough Head to Toe Skin Assessment on the patient. ALL assessment sites listed below have been assessed.      Areas assessed by both nurses:    Head, Face, Ears, Shoulders, Back, Chest, Arms, Elbows, Hands, Sacrum. Buttock, Coccyx, Ischium, Legs. Feet and Heels, and Under Medical Devices         Does the Patient have a Wound? No noted wound(s)       Rakan Prevention initiated by RN: No  Wound Care Orders initiated by RN: No    Pressure Injury (Stage 3,4, Unstageable, DTI, NWPT, and Complex wounds) if present, place Wound referral order by RN under : No    New Ostomies, if present place, Ostomy referral order under : No     Nurse 1 eSignature: Electronically signed by Marifer White RN on 4/21/24 at 6:58 PM EDT    **SHARE this note so that the co-signing nurse can place an eSignature**    Nurse 2 eSignature: Electronically signed by LUIS CHAMBERS RN on 4/22/24 at 6:24 AM EDT   
Clinical Pharmacy Note  Heparin Dosing       Lab Results   Component Value Date/Time    ANTIXAUHEP 0.40 04/22/2024 04:43 AM      Lab Results   Component Value Date/Time    HGB 9.0 04/21/2024 11:00 AM    HCT 27.2 04/21/2024 11:00 AM     04/21/2024 11:00 AM    INR 1.03 06/19/2021 06:31 PM       Current Infusion Rate: 610 units/hr    Plan:  Rate: continue at 610 units/hr  Next anti-Xa level: 1200 4/22/24    Pharmacy will continue to monitor and adjust based on anti-Xa results.  Johny Kitchen, PharmD  
Clinical Pharmacy Note  Heparin Dosing       Lab Results   Component Value Date/Time    ANTIXAUHEP 0.42 04/22/2024 11:32 AM      Lab Results   Component Value Date/Time    HGB 8.8 04/22/2024 06:46 AM    HCT 27.0 04/22/2024 06:46 AM     04/22/2024 06:46 AM    INR 1.03 06/19/2021 06:31 PM       Current Infusion Rate: 610 units/hr    Plan:  Rate: continue at 610 units/hr  Next anti-Xa level: 0600 4/23/24    Pharmacy will continue to monitor and adjust based on anti-Xa results.  Ruth Vega [facility],4/22/2024,12:30 PM    
Clinical Pharmacy Note  Heparin Dosing       Lab Results   Component Value Date/Time    ANTIXAUHEP 0.66 04/21/2024 08:06 PM      Lab Results   Component Value Date/Time    HGB 9.0 04/21/2024 11:00 AM    HCT 27.2 04/21/2024 11:00 AM     04/21/2024 11:00 AM    INR 1.03 06/19/2021 06:31 PM       Current Infusion Rate: 610 units/hr    Plan:  Rate: continue at 610 units/hr  Next anti-Xa level: 0300 4/22/24    Pharmacy will continue to monitor and adjust based on anti-Xa results.  Johny Kitchen, PharmD  
Clinical Pharmacy Note  Heparin Dosing Consult    Maren Meza is a 68 y.o. female ordered heparin per CAD/STEMI/NSTEMI/UA/AFIB nomogram by Dr. Vega.     Lab Results   Component Value Date/Time    ANTIXAUHEP <0.10 03/27/2024 12:53 PM      Lab Results   Component Value Date/Time    HGB 9.0 04/21/2024 11:00 AM    HCT 27.2 04/21/2024 11:00 AM     04/21/2024 11:00 AM    INR 1.03 06/19/2021 06:31 PM       Ht Readings from Last 1 Encounters:   03/26/24 1.524 m (5')        Wt Readings from Last 1 Encounters:   04/21/24 51.1 kg (112 lb 10.5 oz)        Assessment/Plan:  Initial bolus: 3070 units  Initial infusion rate: 610 units/hr  Next anti-Xa:: 2000    Pharmacy will continue to monitor adjust heparin based on anti-Xa results using nomogram below:     CAD/STEMI/NSTEMI/UA/AFIB Heparin Nomogram     Initial Bolus: 60 units/kg Max Bolus: 4,000 units       Initial Rate: 12 units/kg/hr Max Initial Rate: 1,000 units/hr     anti-Xa Bolus Titration   < 0.1 Heparin 60 units/kg bolus Increase drip by 4 units/kg/hr   0.1 - 0.29 Heparin 30 units/kg bolus Increase drip by 2 units/kg/hr   0.3 - 0.7 No Bolus No Change   0.71 - 0.8 No Bolus Decrease drip by 1 units/kg/hr   0.81 - 0.99 No Bolus Decrease drip by 2 units/kg/hr   > 1 Hold Heparin for 1 hour Decrease drip by 3 units/kg/hr     Obtain anti-Xa 6 hours after initial bolus and 6 hours after any dose change until two consecutive therapeutic anti-Xa levels are achieved - then daily.  Marcio Orozco [facility], 4/21/2024 12:37 PM    
Comprehensive Nutrition Assessment    Type and Reason for Visit:  Initial, Positive Nutrition Screen    Nutrition Recommendations/Plan:   Continue CCC(4) / cardiac / 2 gm Na / 1800 ml per day  Add Ensure Compact bid to start     Malnutrition Assessment:  Malnutrition Status:  No malnutrition (04/22/24 1722)    Context:  Acute Illness     Findings of the 6 clinical characteristics of malnutrition:  Energy Intake:  Mild decrease in energy intake (Comment)  Weight Loss:  Unable to assess     Body Fat Loss:  No significant body fat loss     Muscle Mass Loss:  No significant muscle mass loss    Fluid Accumulation:  No significant fluid accumulation     Strength:  Not Performed    Nutrition Assessment:    MST 2 for wt loss and decreased po. PMH includes, heart stents, reflux, TIA, CHF, CKD(3), COPD, DM, Fibromyalgia, IBS, HTN, HLD, Choly. Pt adm r/t chest pain. Cardiology following. Diet adv to CCC (4) / Cardiac / 2 gm Na / 1800 ml per day. Diet just adv, so intake not yet known. Noted a non-significant wt loss trend over the past year, likely r/t combination of altered GI and fluid shifts. Pt did endorse a decreased appetite over the past few weeks. Feedback also revealed that if too much food is eaten at one time, pt becomes nauseated with some vomiting on occasion. Encouraged small frequent meals to help with tolerance. Pt agreed to try. Noted pt required an easy to chew texture on previous adm. Denied need for texture change at this time. Pt requesting supplement. Will add Ensure Compact bid to start as this ONS is fluid and carb friendly. Discussed diet therapy with CHF and DM. Please refer to separate nutrition note dated 4/22/24. Will continue to follow progress.    Nutrition Related Findings:    Labs reviewed. Noted Lantus and Coverage on board to help manage BS. Noted A1C on 4/21/24 at 7.3. Noted BM on 4/19. Colace on board. Noted no edema. Wound Type: None       Current Nutrition Intake & Therapies:  
EDSBAR report has been reviewed.      Electronically signed by Marifer White RN on 4/21/2024 at 3:16 PM     
Occupational Therapy      OT order received and chart reviewed. Pt with bedrest order and RN reports pt still having chest pain. Will defer therapy at this time.     Electronically signed by Telma Atkinson OT on 4/22/2024 at 9:26 AM    
Occupational Therapy  Facility/Department: [address] PROGRESSIVE CARE  Occupational Therapy Initial Assessment    Name: Maren Meza  : 1955  MRN: [id number]  Date of Service: 2024    Discharge Recommendations:  Home independently  OT Equipment Recommendations  Equipment Needed: No     Maren Meza scored a 21/24 on the [facility] ADL Inpatient form.  At this time, no further OT is recommended upon discharge.  Recommend patient returns to prior setting with prior services.       Patient Diagnosis(es): The primary encounter diagnosis was Chest pain, unspecified type. A diagnosis of Hx of coronary artery disease was also pertinent to this visit.  Past Medical History:  has a past medical history of Acid reflux, Anemia, Anxiety and depression, Arthritis, Asthma, Atrial fibrillation (HCC), CAD (coronary artery disease), Cerebral artery occlusion with cerebral infarction (HCC), CHF (congestive heart failure) ([facility]), Chronic kidney disease--stage III, COPD (chronic obstructive pulmonary disease) ([facility]), DM2 (diabetes mellitus, type 2) ([facility]), Dysarthria, Fibromyalgia, Headache(784.0), Hemisensory loss, History of blood transfusion, Hyperlipidemia, Hypertension, IBS (irritable bowel syndrome), Inferior vena cava occlusion (HCC), Keratitis, Meningioma (HCC), MI, old, Neuropathy, Superior vena cava obstruction, Temporal arteritis (HCC), and Wears glasses.  Past Surgical History:  has a past surgical history that includes Tunneled venous port placement; Cholecystectomy; ablation of dysrhythmic focus (  and 2020); Cataract removal (Bilateral); joint replacement (Right); Hysterectomy; Artery Biopsy (Right, 2014); Coronary angioplasty with stent (); Knee arthroscopy (Right); Percutaneous Transluminal Coronary Angio (10/2019); Upper gastrointestinal endoscopy (N/A, 2020); Carotid artery surgery (Left); Colonoscopy (N/A, 2021); and Colonoscopy (N/A, 10/15/2021).    Assessment   Assessment: 67 y/o 
Pain medication administered and patient states pain has improved. Resting comfortably in bed. Will continue to monitor.  
Patient here from ED via stretcher. 02 and heart monitor placed. NSR On tele. VSS. Patient oriented to unit and room. Admit assessment complete and history obtained. Pharmacy called to review patient meds. Patient c/o chest pain at this time. Administering ordered Morphine. Pharmacy to call back to review med list with patient. Orders reviewed with patient and POC discussed. Call light in reach. Bed in lowest position, bed alarm on. Denies other needs. Will continue to monitor.    
Patient reports chest pain 8/10. Message sent to MD with new order noted.  
Physical Therapy      Maren Susana  [id number]  [id number]    PT orders received and chart reviewed; attempted to see for PT eval however pt has a bedrest order and is actively having chest pain per nursing report.  Will defer eval until later  Electronically signed by GEORGETTE GU, PT on 4/22/2024 at 9:26 AM       
Physical Therapy  Facility/Department: [address] PROGRESSIVE CARE  Physical Therapy Initial Assessment/Discharge Note    Name: Maren Meza  : 1955  MRN: [id number]  Date of Service: 2024    Discharge Recommendations:  Home with assist PRN, Home with Home health PT   PT Equipment Recommendations  Equipment Needed: No      Maren Meza scored a 22/24 on the AM-[facility] short mobility form.  At this time, pt would like to resume her home PT as prior.  Recommend patient returns to prior setting with prior services.       Patient Diagnosis(es): The primary encounter diagnosis was Chest pain, unspecified type. A diagnosis of Hx of coronary artery disease was also pertinent to this visit.  Past Medical History:  has a past medical history of Acid reflux, Anemia, Anxiety and depression, Arthritis, Asthma, Atrial fibrillation (HCC), CAD (coronary artery disease), Cerebral artery occlusion with cerebral infarction (HCC), CHF (congestive heart failure) (HCC), Chronic kidney disease--stage III, COPD (chronic obstructive pulmonary disease) (HCC), DM2 (diabetes mellitus, type 2) (HCC), Dysarthria, Fibromyalgia, Headache(784.0), Hemisensory loss, History of blood transfusion, Hyperlipidemia, Hypertension, IBS (irritable bowel syndrome), Inferior vena cava occlusion (HCC), Keratitis, Meningioma (HCC), MI, old, Neuropathy, Superior vena cava obstruction, Temporal arteritis (HCC), and Wears glasses.  Past Surgical History:  has a past surgical history that includes Tunneled venous port placement; Cholecystectomy; ablation of dysrhythmic focus (  and 2020); Cataract removal (Bilateral); joint replacement (Right); Hysterectomy; Artery Biopsy (Right, 2014); Coronary angioplasty with stent (); Knee arthroscopy (Right); Percutaneous Transluminal Coronary Angio (10/2019); Upper gastrointestinal endoscopy (N/A, 2020); Carotid artery surgery (Left); Colonoscopy (N/A, 2021); and Colonoscopy (N/A, 
Received report from RN, Lisbeth, in ED at this time.   
Verbal and typed discharge education provided to patient. Patient verbalized understanding. Peripheral IV and heart monitor removed. Portable monitor returned to [facility]. Patient was transported off of Unit via wheelchair by this RN to be taken home by . Patient to return home with [facility].   
deformity.  Skin: Skin color, texture, turgor normal.  No rashes or lesions.  Neurologic:  Neurovascularly intact without any focal sensory/motor deficits. Cranial nerves: II-XII intact, grossly non-focal.  Psychiatric:  Alert and oriented, thought content appropriate, normal insight  Capillary Refill: Brisk,3 seconds, normal  Peripheral Pulses: +2 palpable, equal bilaterally     Labs:   Recent Labs     04/21/24  1100 04/22/24  0646   WBC 5.0 3.5*   HGB 9.0* 8.8*   HCT 27.2* 27.0*    249     Recent Labs     04/21/24  1100 04/22/24  0443    141   K 3.7 3.9    107   CO2 22 21   BUN 17 14   CREATININE 1.7* 1.2   CALCIUM 9.4 9.2     Recent Labs     04/21/24  1100 04/22/24  0443   AST 15 19   ALT 9* 8*   BILITOT <0.2 0.3   ALKPHOS 155* 150*     No results for input(s): \"INR\" in the last 72 hours.  Recent Labs     04/21/24  1304 04/21/24  1701 04/21/24  1802   TROPHS 44* 40* 43*       Urinalysis:      Lab Results   Component Value Date/Time    NITRU Negative 04/05/2024 11:35 AM    WBCUA 1 04/05/2024 11:35 AM    BACTERIA None Seen 04/05/2024 11:35 AM    RBCUA 1 04/05/2024 11:35 AM    BLOODU Negative 04/05/2024 11:35 AM    SPECGRAV 1.017 04/05/2024 11:35 AM    GLUCOSEU Negative 04/05/2024 11:35 AM    GLUCOSEU NEGATIVE 05/14/2012 03:29 PM       Radiology:  XR CHEST (2 VW)   Final Result   No acute process.             IP CONSULT TO CARDIOLOGY  IP CONSULT TO SPIRITUAL SERVICES  IP CONSULT TO SOCIAL WORK    Assessment/Plan:    Active Hospital Problems    Diagnosis     Chest pain [R07.9]      Priority: Medium       Admit to medical surgical floor with telemetry     24-hour cardiac monitoring  Telemetry     Elevated troponin level  Concern for non-STEMI  Coronary artery disease status post PTCA multiple stents placement  Last cardiac cath 2 months ago  Patient follows up with cardiology last visit was 2 days ago in the office     Continue trending high-sensitivity troponin level  Heparin drip with heparin

## 2024-04-23 NOTE — DISCHARGE INSTR - COC
***    Physician Certification: I certify the above information and transfer of Maren Meza  is necessary for the continuing treatment of the diagnosis listed and that she requires {Admit to Appropriate Level of Care:62274} for {GREATER/LESS:325423374} 30 days.     Update Admission H&P: {CHP DME Changes in HandP:740982369}    PHYSICIAN SIGNATURE:  {Esignature:[id number]}

## 2024-04-25 ENCOUNTER — TELEPHONE (OUTPATIENT)
Dept: PRIMARY CARE CLINIC | Age: 69
End: 2024-04-25

## 2024-04-25 NOTE — TELEPHONE ENCOUNTER
Left message on machine per HIPPA  TCM hosp f/u please transfer call to [facility] ext [phone]    Care Transitions Initial Follow Up Call    Outreach made within 2 business days of discharge: Yes    Patient: Maren Meza Patient : 1955   MRN: [id number]  Reason for Admission: There are no discharge diagnoses documented for the most recent discharge.  Discharge Date: 24       Spoke with: Left a message    Discharge department/facility: [facility]      Follow Up  Future Appointments   Date Time Provider Department Center   2024  2:00 PM Fe Abrams, APRN - CNP [facility]       Celia Chow MA

## 2024-04-26 ENCOUNTER — TELEPHONE (OUTPATIENT)
Dept: PRIMARY CARE CLINIC | Age: 69
End: 2024-04-26

## 2024-04-30 ENCOUNTER — TELEPHONE (OUTPATIENT)
Dept: CARDIOLOGY CLINIC | Age: 69
End: 2024-04-30

## 2024-04-30 NOTE — TELEPHONE ENCOUNTER
Gretchen,   This pt is on my schedule for next Mon 5/6.  FU for arterial doppler but appears was a no show for that testing.   Do they still need to be seen next week? Do they want arterial doppler rescheduled?

## 2024-05-01 NOTE — TELEPHONE ENCOUNTER
Left message for patient to return call. She needs Doppler rescheduled and then will need to schedule to see Fe after completed.

## 2024-05-06 NOTE — TELEPHONE ENCOUNTER
Called and spoke with Maren. She shared that she recently fell and was in [place] for treatment. She is wanting to r/s at a later date.     Informed once she is well and healed, we can r/s appointment and testing.    Maren love/marta.

## 2024-05-06 NOTE — TELEPHONE ENCOUNTER
This patient needs rescheduled. See below, she needs doppler completed then appointment after. Please assist with scheduling.

## 2024-05-08 RX ORDER — NIFEDIPINE 30 MG/1
30 TABLET, EXTENDED RELEASE ORAL DAILY
Qty: 30 TABLET | Refills: 3 | Status: SHIPPED | OUTPATIENT
Start: 2024-05-08

## 2024-05-08 NOTE — TELEPHONE ENCOUNTER
Medication:   Requested Prescriptions     Pending Prescriptions Disp Refills    NIFEdipine (PROCARDIA XL) 30 MG extended release tablet [Pharmacy Med Name: NIFEDIPINE ER OSMOTIC 30MG 30 Tablet] 30 tablet 3     Sig: TAKE 1 TABLET BY MOUTH DAILY        Last Filled:      Patient Phone Number: [phone] (home)     Last appt: 7/21/2023   Next appt: Visit date not found    Last OARRS:       3/30/2023     1:54 PM   RX Monitoring   Periodic Controlled Substance Monitoring Possible medication side effects, risk of tolerance/dependence & alternative treatments discussed.;No signs of potential drug abuse or diversion identified.

## 2024-05-16 DIAGNOSIS — I10 ESSENTIAL HYPERTENSION: ICD-10-CM

## 2024-05-20 RX ORDER — OMEPRAZOLE 20 MG/1
20 CAPSULE, DELAYED RELEASE ORAL DAILY
Qty: 30 CAPSULE | Refills: 1 | Status: SHIPPED | OUTPATIENT
Start: 2024-05-20

## 2024-05-20 NOTE — TELEPHONE ENCOUNTER
Medication:   Requested Prescriptions     Pending Prescriptions Disp Refills    omeprazole (PRILOSEC) 20 MG delayed release capsule [Pharmacy Med Name: OMEPRAZOLE 20 MG CPDR 20 Capsule] 30 capsule 1     Sig: TAKE 1 CAPSULE BY MOUTH DAILY        Last Filled:      Patient Phone Number: [phone] (home)     Last appt: 7/21/2023   Next appt: Visit date not found    Last OARRS:       3/30/2023     1:54 PM   RX Monitoring   Periodic Controlled Substance Monitoring Possible medication side effects, risk of tolerance/dependence & alternative treatments discussed.;No signs of potential drug abuse or diversion identified.

## 2024-05-28 ENCOUNTER — APPOINTMENT (OUTPATIENT)
Dept: GENERAL RADIOLOGY | Age: 69
End: 2024-05-28
Payer: COMMERCIAL

## 2024-05-28 ENCOUNTER — APPOINTMENT (OUTPATIENT)
Dept: CT IMAGING | Age: 69
End: 2024-05-28
Payer: COMMERCIAL

## 2024-05-28 ENCOUNTER — HOSPITAL ENCOUNTER (EMERGENCY)
Age: 69
Discharge: HOME | End: 2024-05-28
Payer: COMMERCIAL

## 2024-05-28 VITALS
BODY MASS INDEX: 22.51 KG/M2 | HEART RATE: 62 BPM | WEIGHT: 114.64 LBS | RESPIRATION RATE: 15 BRPM | TEMPERATURE: 98.7 F | SYSTOLIC BLOOD PRESSURE: 120 MMHG | DIASTOLIC BLOOD PRESSURE: 88 MMHG | HEIGHT: 60 IN | OXYGEN SATURATION: 100 %

## 2024-05-28 DIAGNOSIS — R69 MULTIPLE COMORBID CONDITIONS: ICD-10-CM
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
## 2024-05-28 DIAGNOSIS — S20.212A CONTUSION OF LEFT CHEST WALL, INITIAL ENCOUNTER: ICD-10-CM

## 2024-05-28 PROCEDURE — 6370000000 HC RX 637 (ALT 250 FOR IP): Performed by: GENERAL ACUTE CARE HOSPITAL

## 2024-05-28 PROCEDURE — 72125 CT NECK SPINE W/O DYE: CPT

## 2024-05-28 PROCEDURE — 71101 X-RAY EXAM UNILAT RIBS/CHEST: CPT

## 2024-05-28 PROCEDURE — 70450 CT HEAD/BRAIN W/O DYE: CPT

## 2024-05-28 PROCEDURE — 99284 EMERGENCY DEPT VISIT MOD MDM: CPT

## 2024-05-28 RX ORDER — LIDOCAINE 50 MG/G
1 PATCH TOPICAL DAILY
Qty: 10 PATCH | Refills: 0 | Status: SHIPPED | OUTPATIENT
Start: 2024-05-28 | End: 2024-06-07

## 2024-05-28 RX ORDER — TRAMADOL HYDROCHLORIDE 50 MG/1
50 TABLET ORAL ONCE
Status: COMPLETED | OUTPATIENT
Start: 2024-05-28 | End: 2024-05-28

## 2024-05-28 RX ADMIN — TRAMADOL HYDROCHLORIDE 50 MG: 50 TABLET ORAL at 14:43

## 2024-05-28 ASSESSMENT — PAIN DESCRIPTION - ONSET
ONSET: ON-GOING

## 2024-05-28 ASSESSMENT — ENCOUNTER SYMPTOMS
VOMITING: 0
NAUSEA: 0
BACK PAIN: 0
SORE THROAT: 0
SHORTNESS OF BREATH: 0
WHEEZING: 0
COUGH: 0
ABDOMINAL PAIN: 0
CHEST TIGHTNESS: 0
VOICE CHANGE: 0

## 2024-05-28 ASSESSMENT — PAIN SCALES - GENERAL
PAINLEVEL_OUTOF10: 7
PAINLEVEL_OUTOF10: 8
PAINLEVEL_OUTOF10: 7

## 2024-05-28 ASSESSMENT — PAIN DESCRIPTION - PAIN TYPE
TYPE: ACUTE PAIN

## 2024-05-28 ASSESSMENT — PAIN DESCRIPTION - FREQUENCY
FREQUENCY: CONTINUOUS

## 2024-05-28 ASSESSMENT — PAIN - FUNCTIONAL ASSESSMENT
PAIN_FUNCTIONAL_ASSESSMENT: 0-10
PAIN_FUNCTIONAL_ASSESSMENT: ACTIVITIES ARE NOT PREVENTED

## 2024-05-28 ASSESSMENT — PAIN DESCRIPTION - LOCATION
LOCATION: RIB CAGE

## 2024-05-28 ASSESSMENT — PAIN DESCRIPTION - DESCRIPTORS
DESCRIPTORS: DISCOMFORT

## 2024-05-28 ASSESSMENT — PAIN DESCRIPTION - ORIENTATION
ORIENTATION: LEFT

## 2024-05-28 NOTE — ED PROVIDER NOTES
fracture, chest wall contusion, rib fracture, pneumothorax, pneumonia, intrathoracic injury, ACS, MI    Patient medicated with tramadol by mouth for pain.  Patient given incentive spirometry and instructed upon use.  Imaging studies pending.    CT head interpreted by radiologist as negative for acute pathology.  CT C-spine interpreted by radiologist as negative for fracture or malalignment.  Chest and left rib x-rays interpreted by radiologist and reviewed by myself are negative for pneumothorax, pulmonary edema, or rib fracture.    Patient reassessed multiple times throughout ED visit.  She has remained hemodynamically stable.  Patient does report improvement of symptoms after intervention.  Patient updated on test results.  At this time there is no evidence of any life-threatening or emergent conditions requiring immediate intervention.  Shared decision making employed and patient is agreeable to discharge with emphasis on close outpatient follow-up.  Patient encouraged to apply ice to any sore area for 20 minutes every 3-4 hours.  She is given prescription for lidocaine patch to use as directed.  She is advised to take OTC Tylenol as directed to help with any discomfort.  She agrees to follow-up with her primary care provider within the next 2 to 3 days.  She agrees return for new or worsening symptoms.    Appropriate for outpatient management       The patient is at low risk for mortality based on demographic, history and clinical factors. Given the best available information and clinical assessment, I estimate the risk of hospitalization to be greater than risk of treatment at home. I have explained to the patient that the risk could rapidly change, given precautions for return and instructions. Explained to patient that the risk for mortality is low based on demographic, history and clinical factors.      I discussed with patient the results of evaluation in the ED, diagnosis, care, and prognosis.  The plan is

## 2024-05-28 NOTE — DISCHARGE INSTRUCTIONS
Apply ice to any sore area for 20 minutes every 3-4 hours.  Take OTC Tylenol as directed for discomfort.  Use topical lidocaine patch as directed to help with your discomfort.  Follow-up with your primary care provider within the next 2 to 3 days.  Return for new or worsening symptoms.

## 2024-05-28 NOTE — ED NOTES
Introduce myself to the patient, identification band inplace, stretcher in the lowest position for safety, and the call light is in reach. Updated patient on Emergency Department plan of care, no additional needs at this time, will continue with care.    EDNP at bedside.

## 2024-06-04 ENCOUNTER — APPOINTMENT (OUTPATIENT)
Dept: GENERAL RADIOLOGY | Age: 69
End: 2024-06-04
Payer: COMMERCIAL

## 2024-06-04 ENCOUNTER — HOSPITAL ENCOUNTER (OUTPATIENT)
Age: 69
Setting detail: OBSERVATION
Discharge: HOME | End: 2024-06-05
Attending: EMERGENCY MEDICINE | Admitting: INTERNAL MEDICINE
Payer: COMMERCIAL

## 2024-06-04 DIAGNOSIS — R07.9 CHEST PAIN, UNSPECIFIED TYPE: Primary | ICD-10-CM

## 2024-06-04 DIAGNOSIS — E11.59 TYPE 2 DIABETES MELLITUS WITH OTHER CIRCULATORY COMPLICATION, WITH LONG-TERM CURRENT USE OF INSULIN (HCC): ICD-10-CM

## 2024-06-04 DIAGNOSIS — N17.9 AKI (ACUTE KIDNEY INJURY) (HCC): ICD-10-CM

## 2024-06-04 DIAGNOSIS — Z79.4 TYPE 2 DIABETES MELLITUS WITH OTHER CIRCULATORY COMPLICATION, WITH LONG-TERM CURRENT USE OF INSULIN (HCC): ICD-10-CM

## 2024-06-04 DIAGNOSIS — R79.89 ELEVATED BRAIN NATRIURETIC PEPTIDE (BNP) LEVEL: ICD-10-CM

## 2024-06-04 LAB
ALBUMIN SERPL-MCNC: 3.3 G/DL (ref 3.4–5)
ALBUMIN/GLOB SERPL: 0.9 {RATIO} (ref 1.1–2.2)
ALP SERPL-CCNC: 143 U/L (ref 40–129)
ALT SERPL-CCNC: <5 U/L (ref 10–40)
ANION GAP SERPL CALCULATED.3IONS-SCNC: 16 MMOL/L (ref 3–16)
AST SERPL-CCNC: 14 U/L (ref 15–37)
BACTERIA URNS QL MICRO: ABNORMAL /HPF
BASOPHILS # BLD: 0 K/UL (ref 0–0.2)
BASOPHILS NFR BLD: 0.7 %
BILIRUB SERPL-MCNC: <0.2 MG/DL (ref 0–1)
BILIRUB UR QL STRIP.AUTO: NEGATIVE
BUN SERPL-MCNC: 33 MG/DL (ref 7–20)
CALCIUM SERPL-MCNC: 9.1 MG/DL (ref 8.3–10.6)
CHLORIDE SERPL-SCNC: 106 MMOL/L (ref 99–110)
CLARITY UR: CLEAR
CO2 SERPL-SCNC: 16 MMOL/L (ref 21–32)
COLOR UR: YELLOW
CREAT SERPL-MCNC: 1.9 MG/DL (ref 0.6–1.2)
DEPRECATED RDW RBC AUTO: 15.1 % (ref 12.4–15.4)
EKG ATRIAL RATE: 46 BPM
EKG ATRIAL RATE: 56 BPM
EKG DIAGNOSIS: NORMAL
EKG DIAGNOSIS: NORMAL
EKG P AXIS: -23 DEGREES
EKG P-R INTERVAL: 96 MS
EKG Q-T INTERVAL: 424 MS
EKG Q-T INTERVAL: 484 MS
EKG QRS DURATION: 84 MS
EKG QRS DURATION: 90 MS
EKG QTC CALCULATION (BAZETT): 409 MS
EKG QTC CALCULATION (BAZETT): 441 MS
EKG R AXIS: 115 DEGREES
EKG R AXIS: 58 DEGREES
EKG T AXIS: -41 DEGREES
EKG T AXIS: 67 DEGREES
EKG VENTRICULAR RATE: 50 BPM
EKG VENTRICULAR RATE: 56 BPM
EOSINOPHIL # BLD: 0.2 K/UL (ref 0–0.6)
EOSINOPHIL NFR BLD: 3.9 %
EPI CELLS #/AREA URNS HPF: ABNORMAL /HPF (ref 0–5)
EST. AVERAGE GLUCOSE BLD GHB EST-MCNC: 171.4 MG/DL
GFR SERPLBLD CREATININE-BSD FMLA CKD-EPI: 28 ML/MIN/{1.73_M2}
GLUCOSE BLD-MCNC: 102 MG/DL (ref 70–99)
GLUCOSE BLD-MCNC: 121 MG/DL (ref 70–99)
GLUCOSE BLD-MCNC: 126 MG/DL (ref 70–99)
GLUCOSE SERPL-MCNC: 170 MG/DL (ref 70–99)
GLUCOSE UR STRIP.AUTO-MCNC: NEGATIVE MG/DL
HBA1C MFR BLD: 7.6 %
HCT VFR BLD AUTO: 31.5 % (ref 36–48)
HGB BLD-MCNC: 10.2 G/DL (ref 12–16)
HGB UR QL STRIP.AUTO: ABNORMAL
KETONES UR STRIP.AUTO-MCNC: NEGATIVE MG/DL
LEUKOCYTE ESTERASE UR QL STRIP.AUTO: ABNORMAL
LIPASE SERPL-CCNC: 14 U/L (ref 13–60)
LYMPHOCYTES # BLD: 1.7 K/UL (ref 1–5.1)
LYMPHOCYTES NFR BLD: 27.1 %
MAGNESIUM SERPL-MCNC: 2 MG/DL (ref 1.8–2.4)
MCH RBC QN AUTO: 30.1 PG (ref 26–34)
MCHC RBC AUTO-ENTMCNC: 32.5 G/DL (ref 31–36)
MCV RBC AUTO: 92.5 FL (ref 80–100)
MONOCYTES # BLD: 0.4 K/UL (ref 0–1.3)
MONOCYTES NFR BLD: 6.7 %
NEUTROPHILS # BLD: 3.8 K/UL (ref 1.7–7.7)
NEUTROPHILS NFR BLD: 61.6 %
NITRITE UR QL STRIP.AUTO: NEGATIVE
NT-PROBNP SERPL-MCNC: 4258 PG/ML (ref 0–124)
PERFORMED ON: ABNORMAL
PH UR STRIP.AUTO: 6 [PH] (ref 5–8)
PLATELET # BLD AUTO: 284 K/UL (ref 135–450)
PMV BLD AUTO: 8.4 FL (ref 5–10.5)
POTASSIUM SERPL-SCNC: 3.1 MMOL/L (ref 3.5–5.1)
PROT SERPL-MCNC: 7.1 G/DL (ref 6.4–8.2)
PROT UR STRIP.AUTO-MCNC: 100 MG/DL
RBC # BLD AUTO: 3.4 M/UL (ref 4–5.2)
RBC #/AREA URNS HPF: ABNORMAL /HPF (ref 0–4)
REASON FOR REJECTION: NORMAL
REJECTED TEST: NORMAL
SODIUM SERPL-SCNC: 138 MMOL/L (ref 136–145)
SP GR UR STRIP.AUTO: 1.02 (ref 1–1.03)
TROPONIN, HIGH SENSITIVITY: 29 NG/L (ref 0–14)
TROPONIN, HIGH SENSITIVITY: 34 NG/L (ref 0–14)
TROPONIN, HIGH SENSITIVITY: 36 NG/L (ref 0–14)
UA COMPLETE W REFLEX CULTURE PNL UR: YES
UA DIPSTICK W REFLEX MICRO PNL UR: YES
URN SPEC COLLECT METH UR: ABNORMAL
UROBILINOGEN UR STRIP-ACNC: 0.2 E.U./DL
WBC # BLD AUTO: 6.1 K/UL (ref 4–11)
WBC #/AREA URNS HPF: ABNORMAL /HPF (ref 0–5)

## 2024-06-04 PROCEDURE — 80053 COMPREHEN METABOLIC PANEL: CPT

## 2024-06-04 PROCEDURE — 2580000003 HC RX 258: Performed by: INTERNAL MEDICINE

## 2024-06-04 PROCEDURE — 81001 URINALYSIS AUTO W/SCOPE: CPT

## 2024-06-04 PROCEDURE — 36415 COLL VENOUS BLD VENIPUNCTURE: CPT

## 2024-06-04 PROCEDURE — G0378 HOSPITAL OBSERVATION PER HR: HCPCS

## 2024-06-04 PROCEDURE — 99285 EMERGENCY DEPT VISIT HI MDM: CPT

## 2024-06-04 PROCEDURE — 93010 ELECTROCARDIOGRAM REPORT: CPT | Performed by: INTERNAL MEDICINE

## 2024-06-04 PROCEDURE — 6370000000 HC RX 637 (ALT 250 FOR IP): Performed by: INTERNAL MEDICINE

## 2024-06-04 PROCEDURE — 87077 CULTURE AEROBIC IDENTIFY: CPT

## 2024-06-04 PROCEDURE — 99222 1ST HOSP IP/OBS MODERATE 55: CPT | Performed by: INTERNAL MEDICINE

## 2024-06-04 PROCEDURE — 96376 TX/PRO/DX INJ SAME DRUG ADON: CPT

## 2024-06-04 PROCEDURE — 97161 PT EVAL LOW COMPLEX 20 MIN: CPT

## 2024-06-04 PROCEDURE — 6360000002 HC RX W HCPCS: Performed by: EMERGENCY MEDICINE

## 2024-06-04 PROCEDURE — 96374 THER/PROPH/DIAG INJ IV PUSH: CPT

## 2024-06-04 PROCEDURE — 97165 OT EVAL LOW COMPLEX 30 MIN: CPT

## 2024-06-04 PROCEDURE — 85025 COMPLETE CBC W/AUTO DIFF WBC: CPT

## 2024-06-04 PROCEDURE — 97530 THERAPEUTIC ACTIVITIES: CPT

## 2024-06-04 PROCEDURE — 83690 ASSAY OF LIPASE: CPT

## 2024-06-04 PROCEDURE — 83735 ASSAY OF MAGNESIUM: CPT

## 2024-06-04 PROCEDURE — 6360000002 HC RX W HCPCS: Performed by: INTERNAL MEDICINE

## 2024-06-04 PROCEDURE — 93005 ELECTROCARDIOGRAM TRACING: CPT | Performed by: EMERGENCY MEDICINE

## 2024-06-04 PROCEDURE — 84484 ASSAY OF TROPONIN QUANT: CPT

## 2024-06-04 PROCEDURE — 71045 X-RAY EXAM CHEST 1 VIEW: CPT

## 2024-06-04 PROCEDURE — 87086 URINE CULTURE/COLONY COUNT: CPT

## 2024-06-04 PROCEDURE — 6370000000 HC RX 637 (ALT 250 FOR IP): Performed by: EMERGENCY MEDICINE

## 2024-06-04 PROCEDURE — 96375 TX/PRO/DX INJ NEW DRUG ADDON: CPT

## 2024-06-04 PROCEDURE — 83036 HEMOGLOBIN GLYCOSYLATED A1C: CPT

## 2024-06-04 PROCEDURE — 83880 ASSAY OF NATRIURETIC PEPTIDE: CPT

## 2024-06-04 RX ORDER — SODIUM CHLORIDE 9 MG/ML
INJECTION, SOLUTION INTRAVENOUS PRN
Status: DISCONTINUED | OUTPATIENT
Start: 2024-06-04 | End: 2024-06-05 | Stop reason: HOSPADM

## 2024-06-04 RX ORDER — BUSPIRONE HYDROCHLORIDE 5 MG/1
5 TABLET ORAL 2 TIMES DAILY
Status: DISCONTINUED | OUTPATIENT
Start: 2024-06-04 | End: 2024-06-05 | Stop reason: HOSPADM

## 2024-06-04 RX ORDER — ACETAMINOPHEN 650 MG/1
650 SUPPOSITORY RECTAL EVERY 6 HOURS PRN
Status: DISCONTINUED | OUTPATIENT
Start: 2024-06-04 | End: 2024-06-05 | Stop reason: HOSPADM

## 2024-06-04 RX ORDER — ISOSORBIDE MONONITRATE 60 MG/1
60 TABLET, EXTENDED RELEASE ORAL 2 TIMES DAILY
Status: DISCONTINUED | OUTPATIENT
Start: 2024-06-04 | End: 2024-06-05 | Stop reason: HOSPADM

## 2024-06-04 RX ORDER — TRAMADOL HYDROCHLORIDE 50 MG/1
50 TABLET ORAL EVERY 6 HOURS PRN
Status: DISCONTINUED | OUTPATIENT
Start: 2024-06-04 | End: 2024-06-05 | Stop reason: HOSPADM

## 2024-06-04 RX ORDER — NITROGLYCERIN 0.4 MG/1
0.4 TABLET SUBLINGUAL ONCE
Status: DISCONTINUED | OUTPATIENT
Start: 2024-06-04 | End: 2024-06-05 | Stop reason: HOSPADM

## 2024-06-04 RX ORDER — NIFEDIPINE 30 MG/1
30 TABLET, EXTENDED RELEASE ORAL 2 TIMES DAILY
Status: DISCONTINUED | OUTPATIENT
Start: 2024-06-04 | End: 2024-06-05 | Stop reason: HOSPADM

## 2024-06-04 RX ORDER — SODIUM CHLORIDE 0.9 % (FLUSH) 0.9 %
10 SYRINGE (ML) INJECTION PRN
Status: DISCONTINUED | OUTPATIENT
Start: 2024-06-04 | End: 2024-06-05 | Stop reason: HOSPADM

## 2024-06-04 RX ORDER — MIDODRINE HYDROCHLORIDE 5 MG/1
5 TABLET ORAL
Status: DISCONTINUED | OUTPATIENT
Start: 2024-06-04 | End: 2024-06-05 | Stop reason: HOSPADM

## 2024-06-04 RX ORDER — CLOTRIMAZOLE AND BETAMETHASONE DIPROPIONATE 10; .64 MG/G; MG/G
CREAM TOPICAL 2 TIMES DAILY PRN
Status: DISCONTINUED | OUTPATIENT
Start: 2024-06-04 | End: 2024-06-05 | Stop reason: HOSPADM

## 2024-06-04 RX ORDER — INSULIN LISPRO 100 [IU]/ML
0-4 INJECTION, SOLUTION INTRAVENOUS; SUBCUTANEOUS
Status: DISCONTINUED | OUTPATIENT
Start: 2024-06-04 | End: 2024-06-05 | Stop reason: HOSPADM

## 2024-06-04 RX ORDER — HEPARIN SODIUM 5000 [USP'U]/ML
5000 INJECTION, SOLUTION INTRAVENOUS; SUBCUTANEOUS EVERY 8 HOURS SCHEDULED
Status: DISCONTINUED | OUTPATIENT
Start: 2024-06-04 | End: 2024-06-05 | Stop reason: HOSPADM

## 2024-06-04 RX ORDER — ACETAMINOPHEN 325 MG/1
650 TABLET ORAL EVERY 6 HOURS PRN
Status: DISCONTINUED | OUTPATIENT
Start: 2024-06-04 | End: 2024-06-05 | Stop reason: HOSPADM

## 2024-06-04 RX ORDER — ALBUTEROL SULFATE 2.5 MG/3ML
2.5 SOLUTION RESPIRATORY (INHALATION) EVERY 4 HOURS PRN
Status: DISCONTINUED | OUTPATIENT
Start: 2024-06-04 | End: 2024-06-05 | Stop reason: HOSPADM

## 2024-06-04 RX ORDER — LOSARTAN POTASSIUM 50 MG/1
50 TABLET ORAL DAILY
COMMUNITY

## 2024-06-04 RX ORDER — ALBUTEROL SULFATE 90 UG/1
2 AEROSOL, METERED RESPIRATORY (INHALATION) EVERY 4 HOURS PRN
Status: DISCONTINUED | OUTPATIENT
Start: 2024-06-04 | End: 2024-06-05 | Stop reason: HOSPADM

## 2024-06-04 RX ORDER — MORPHINE SULFATE 4 MG/ML
4 INJECTION, SOLUTION INTRAMUSCULAR; INTRAVENOUS ONCE
Status: COMPLETED | OUTPATIENT
Start: 2024-06-04 | End: 2024-06-04

## 2024-06-04 RX ORDER — MONTELUKAST SODIUM 10 MG/1
10 TABLET ORAL DAILY
Status: DISCONTINUED | OUTPATIENT
Start: 2024-06-04 | End: 2024-06-05 | Stop reason: HOSPADM

## 2024-06-04 RX ORDER — DULOXETIN HYDROCHLORIDE 60 MG/1
60 CAPSULE, DELAYED RELEASE ORAL DAILY
Status: DISCONTINUED | OUTPATIENT
Start: 2024-06-05 | End: 2024-06-05 | Stop reason: HOSPADM

## 2024-06-04 RX ORDER — HYDROXYZINE HYDROCHLORIDE 25 MG/1
25 TABLET, FILM COATED ORAL 3 TIMES DAILY PRN
Status: DISCONTINUED | OUTPATIENT
Start: 2024-06-04 | End: 2024-06-05 | Stop reason: HOSPADM

## 2024-06-04 RX ORDER — CYCLOBENZAPRINE HCL 10 MG
5 TABLET ORAL 3 TIMES DAILY PRN
Status: DISCONTINUED | OUTPATIENT
Start: 2024-06-04 | End: 2024-06-05 | Stop reason: HOSPADM

## 2024-06-04 RX ORDER — METOPROLOL SUCCINATE 50 MG/1
50 TABLET, EXTENDED RELEASE ORAL 2 TIMES DAILY
COMMUNITY

## 2024-06-04 RX ORDER — GLUCAGON 1 MG/ML
1 KIT INJECTION PRN
Status: DISCONTINUED | OUTPATIENT
Start: 2024-06-04 | End: 2024-06-05 | Stop reason: HOSPADM

## 2024-06-04 RX ORDER — LEVETIRACETAM 500 MG/1
500 TABLET ORAL 2 TIMES DAILY
Status: DISCONTINUED | OUTPATIENT
Start: 2024-06-04 | End: 2024-06-05 | Stop reason: HOSPADM

## 2024-06-04 RX ORDER — NICOTINE 21 MG/24HR
1 PATCH, TRANSDERMAL 24 HOURS TRANSDERMAL DAILY
Status: DISCONTINUED | OUTPATIENT
Start: 2024-06-04 | End: 2024-06-04

## 2024-06-04 RX ORDER — INSULIN GLARGINE 100 [IU]/ML
8 INJECTION, SOLUTION SUBCUTANEOUS NIGHTLY
Status: DISCONTINUED | OUTPATIENT
Start: 2024-06-04 | End: 2024-06-05 | Stop reason: HOSPADM

## 2024-06-04 RX ORDER — METOPROLOL SUCCINATE 50 MG/1
50 TABLET, EXTENDED RELEASE ORAL 2 TIMES DAILY
Status: DISCONTINUED | OUTPATIENT
Start: 2024-06-04 | End: 2024-06-05 | Stop reason: HOSPADM

## 2024-06-04 RX ORDER — DOCUSATE SODIUM 100 MG/1
100 CAPSULE, LIQUID FILLED ORAL 2 TIMES DAILY
Status: DISCONTINUED | OUTPATIENT
Start: 2024-06-04 | End: 2024-06-05 | Stop reason: HOSPADM

## 2024-06-04 RX ORDER — CETIRIZINE HYDROCHLORIDE 10 MG/1
5 TABLET ORAL DAILY
Status: DISCONTINUED | OUTPATIENT
Start: 2024-06-04 | End: 2024-06-05 | Stop reason: HOSPADM

## 2024-06-04 RX ORDER — DULOXETIN HYDROCHLORIDE 20 MG/1
20 CAPSULE, DELAYED RELEASE ORAL DAILY
Status: DISCONTINUED | OUTPATIENT
Start: 2024-06-04 | End: 2024-06-04

## 2024-06-04 RX ORDER — FLUTICASONE PROPIONATE 50 MCG
1 SPRAY, SUSPENSION (ML) NASAL DAILY
Status: DISCONTINUED | OUTPATIENT
Start: 2024-06-04 | End: 2024-06-05 | Stop reason: HOSPADM

## 2024-06-04 RX ORDER — CARVEDILOL 3.12 MG/1
3.12 TABLET ORAL 2 TIMES DAILY WITH MEALS
Status: DISCONTINUED | OUTPATIENT
Start: 2024-06-04 | End: 2024-06-04

## 2024-06-04 RX ORDER — FERROUS SULFATE 325(65) MG
325 TABLET ORAL
Status: DISCONTINUED | OUTPATIENT
Start: 2024-06-05 | End: 2024-06-05 | Stop reason: HOSPADM

## 2024-06-04 RX ORDER — LANOLIN ALCOHOL/MO/W.PET/CERES
3 CREAM (GRAM) TOPICAL NIGHTLY
Status: DISCONTINUED | OUTPATIENT
Start: 2024-06-04 | End: 2024-06-05 | Stop reason: HOSPADM

## 2024-06-04 RX ORDER — RANOLAZINE 500 MG/1
500 TABLET, EXTENDED RELEASE ORAL 2 TIMES DAILY
Status: DISCONTINUED | OUTPATIENT
Start: 2024-06-04 | End: 2024-06-05 | Stop reason: HOSPADM

## 2024-06-04 RX ORDER — AMITRIPTYLINE HYDROCHLORIDE 10 MG/1
40 TABLET, FILM COATED ORAL NIGHTLY
Status: DISCONTINUED | OUTPATIENT
Start: 2024-06-04 | End: 2024-06-05 | Stop reason: HOSPADM

## 2024-06-04 RX ORDER — SODIUM CHLORIDE 0.9 % (FLUSH) 0.9 %
10 SYRINGE (ML) INJECTION EVERY 12 HOURS SCHEDULED
Status: DISCONTINUED | OUTPATIENT
Start: 2024-06-04 | End: 2024-06-05 | Stop reason: HOSPADM

## 2024-06-04 RX ORDER — PROMETHAZINE HYDROCHLORIDE 25 MG/1
12.5 TABLET ORAL EVERY 6 HOURS PRN
Status: DISCONTINUED | OUTPATIENT
Start: 2024-06-04 | End: 2024-06-05 | Stop reason: HOSPADM

## 2024-06-04 RX ORDER — ONDANSETRON 2 MG/ML
4 INJECTION INTRAMUSCULAR; INTRAVENOUS EVERY 6 HOURS PRN
Status: DISCONTINUED | OUTPATIENT
Start: 2024-06-04 | End: 2024-06-05 | Stop reason: HOSPADM

## 2024-06-04 RX ORDER — DEXTROSE MONOHYDRATE 100 MG/ML
INJECTION, SOLUTION INTRAVENOUS CONTINUOUS PRN
Status: DISCONTINUED | OUTPATIENT
Start: 2024-06-04 | End: 2024-06-05 | Stop reason: HOSPADM

## 2024-06-04 RX ORDER — ASPIRIN 325 MG
325 TABLET ORAL ONCE
Status: COMPLETED | OUTPATIENT
Start: 2024-06-04 | End: 2024-06-04

## 2024-06-04 RX ORDER — ASPIRIN 81 MG/1
81 TABLET, CHEWABLE ORAL DAILY
Status: DISCONTINUED | OUTPATIENT
Start: 2024-06-04 | End: 2024-06-05 | Stop reason: HOSPADM

## 2024-06-04 RX ORDER — SODIUM CHLORIDE 9 MG/ML
INJECTION, SOLUTION INTRAVENOUS CONTINUOUS
Status: ACTIVE | OUTPATIENT
Start: 2024-06-04 | End: 2024-06-04

## 2024-06-04 RX ORDER — CARVEDILOL 6.25 MG/1
3.12 TABLET ORAL ONCE
Status: COMPLETED | OUTPATIENT
Start: 2024-06-04 | End: 2024-06-04

## 2024-06-04 RX ORDER — DULOXETIN HYDROCHLORIDE 60 MG/1
60 CAPSULE, DELAYED RELEASE ORAL DAILY
COMMUNITY

## 2024-06-04 RX ORDER — ATORVASTATIN CALCIUM 80 MG/1
80 TABLET, FILM COATED ORAL NIGHTLY
Status: DISCONTINUED | OUTPATIENT
Start: 2024-06-04 | End: 2024-06-05 | Stop reason: HOSPADM

## 2024-06-04 RX ORDER — NIFEDIPINE 30 MG/1
30 TABLET, EXTENDED RELEASE ORAL DAILY
Status: DISCONTINUED | OUTPATIENT
Start: 2024-06-04 | End: 2024-06-04

## 2024-06-04 RX ORDER — RANOLAZINE 500 MG/1
1000 TABLET, EXTENDED RELEASE ORAL 2 TIMES DAILY
Status: DISCONTINUED | OUTPATIENT
Start: 2024-06-04 | End: 2024-06-04 | Stop reason: DRUGHIGH

## 2024-06-04 RX ORDER — QUETIAPINE FUMARATE 50 MG/1
50 TABLET, FILM COATED ORAL NIGHTLY
Status: DISCONTINUED | OUTPATIENT
Start: 2024-06-04 | End: 2024-06-05 | Stop reason: HOSPADM

## 2024-06-04 RX ORDER — LOSARTAN POTASSIUM 50 MG/1
50 TABLET ORAL DAILY
Status: DISCONTINUED | OUTPATIENT
Start: 2024-06-04 | End: 2024-06-05 | Stop reason: HOSPADM

## 2024-06-04 RX ORDER — INSULIN LISPRO 100 [IU]/ML
0-4 INJECTION, SOLUTION INTRAVENOUS; SUBCUTANEOUS NIGHTLY
Status: DISCONTINUED | OUTPATIENT
Start: 2024-06-04 | End: 2024-06-05 | Stop reason: HOSPADM

## 2024-06-04 RX ORDER — PANTOPRAZOLE SODIUM 40 MG/1
40 TABLET, DELAYED RELEASE ORAL
Status: DISCONTINUED | OUTPATIENT
Start: 2024-06-05 | End: 2024-06-05 | Stop reason: HOSPADM

## 2024-06-04 RX ADMIN — NIFEDIPINE 30 MG: 30 TABLET, EXTENDED RELEASE ORAL at 21:16

## 2024-06-04 RX ADMIN — ISOSORBIDE MONONITRATE 60 MG: 60 TABLET, EXTENDED RELEASE ORAL at 12:55

## 2024-06-04 RX ADMIN — FLUTICASONE PROPIONATE 1 SPRAY: 50 SPRAY, METERED NASAL at 12:57

## 2024-06-04 RX ADMIN — TICAGRELOR 90 MG: 90 TABLET ORAL at 07:05

## 2024-06-04 RX ADMIN — MIDODRINE HYDROCHLORIDE 5 MG: 5 TABLET ORAL at 12:55

## 2024-06-04 RX ADMIN — LEVETIRACETAM 500 MG: 500 TABLET, FILM COATED ORAL at 21:17

## 2024-06-04 RX ADMIN — CETIRIZINE HYDROCHLORIDE 5 MG: 10 TABLET, FILM COATED ORAL at 15:24

## 2024-06-04 RX ADMIN — ASPIRIN 81 MG: 81 TABLET, CHEWABLE ORAL at 07:04

## 2024-06-04 RX ADMIN — SODIUM CHLORIDE: 9 INJECTION, SOLUTION INTRAVENOUS at 13:03

## 2024-06-04 RX ADMIN — ISOSORBIDE MONONITRATE 60 MG: 60 TABLET, EXTENDED RELEASE ORAL at 21:17

## 2024-06-04 RX ADMIN — LEVETIRACETAM 500 MG: 500 TABLET, FILM COATED ORAL at 07:04

## 2024-06-04 RX ADMIN — TICAGRELOR 90 MG: 90 TABLET ORAL at 21:17

## 2024-06-04 RX ADMIN — TRAMADOL HYDROCHLORIDE 50 MG: 50 TABLET ORAL at 21:30

## 2024-06-04 RX ADMIN — MORPHINE SULFATE 4 MG: 4 INJECTION, SOLUTION INTRAMUSCULAR; INTRAVENOUS at 00:49

## 2024-06-04 RX ADMIN — NIFEDIPINE 30 MG: 30 TABLET, EXTENDED RELEASE ORAL at 12:55

## 2024-06-04 RX ADMIN — RANOLAZINE 500 MG: 500 TABLET, FILM COATED, EXTENDED RELEASE ORAL at 15:24

## 2024-06-04 RX ADMIN — Medication 3 MG: at 21:15

## 2024-06-04 RX ADMIN — HEPARIN SODIUM 5000 UNITS: 5000 INJECTION INTRAVENOUS; SUBCUTANEOUS at 21:17

## 2024-06-04 RX ADMIN — TRAMADOL HYDROCHLORIDE 50 MG: 50 TABLET ORAL at 05:52

## 2024-06-04 RX ADMIN — BUSPIRONE HYDROCHLORIDE 5 MG: 5 TABLET ORAL at 21:17

## 2024-06-04 RX ADMIN — ASPIRIN 325 MG: 325 TABLET ORAL at 00:49

## 2024-06-04 RX ADMIN — DOCUSATE SODIUM 100 MG: 100 CAPSULE, LIQUID FILLED ORAL at 12:55

## 2024-06-04 RX ADMIN — MONTELUKAST 10 MG: 10 TABLET, FILM COATED ORAL at 12:55

## 2024-06-04 RX ADMIN — POTASSIUM BICARBONATE 40 MEQ: 391 TABLET, EFFERVESCENT ORAL at 02:57

## 2024-06-04 RX ADMIN — LOSARTAN POTASSIUM 50 MG: 50 TABLET, FILM COATED ORAL at 15:24

## 2024-06-04 RX ADMIN — AMITRIPTYLINE HYDROCHLORIDE 40 MG: 10 TABLET, FILM COATED ORAL at 21:16

## 2024-06-04 RX ADMIN — SODIUM CHLORIDE, PRESERVATIVE FREE 10 ML: 5 INJECTION INTRAVENOUS at 21:18

## 2024-06-04 RX ADMIN — INSULIN GLARGINE 8 UNITS: 100 INJECTION, SOLUTION SUBCUTANEOUS at 21:18

## 2024-06-04 RX ADMIN — RANOLAZINE 500 MG: 500 TABLET, FILM COATED, EXTENDED RELEASE ORAL at 21:17

## 2024-06-04 RX ADMIN — QUETIAPINE FUMARATE 50 MG: 50 TABLET ORAL at 21:31

## 2024-06-04 RX ADMIN — HEPARIN SODIUM 5000 UNITS: 5000 INJECTION INTRAVENOUS; SUBCUTANEOUS at 15:25

## 2024-06-04 RX ADMIN — DULOXETINE HYDROCHLORIDE 20 MG: 20 CAPSULE, DELAYED RELEASE ORAL at 12:55

## 2024-06-04 RX ADMIN — ATORVASTATIN CALCIUM 80 MG: 80 TABLET, FILM COATED ORAL at 21:17

## 2024-06-04 RX ADMIN — DOCUSATE SODIUM 100 MG: 100 CAPSULE, LIQUID FILLED ORAL at 21:17

## 2024-06-04 RX ADMIN — CARVEDILOL 3.12 MG: 6.25 TABLET, FILM COATED ORAL at 07:05

## 2024-06-04 ASSESSMENT — PAIN SCALES - GENERAL
PAINLEVEL_OUTOF10: 6
PAINLEVEL_OUTOF10: 8
PAINLEVEL_OUTOF10: 4
PAINLEVEL_OUTOF10: 7
PAINLEVEL_OUTOF10: 8

## 2024-06-04 ASSESSMENT — PAIN DESCRIPTION - LOCATION
LOCATION: HEAD
LOCATION: CHEST
LOCATION: CHEST

## 2024-06-04 ASSESSMENT — PAIN DESCRIPTION - ONSET: ONSET: ON-GOING

## 2024-06-04 ASSESSMENT — PAIN SCALES - WONG BAKER
WONGBAKER_NUMERICALRESPONSE: NO HURT
WONGBAKER_NUMERICALRESPONSE: NO HURT

## 2024-06-04 ASSESSMENT — PAIN DESCRIPTION - PAIN TYPE
TYPE: ACUTE PAIN
TYPE: ACUTE PAIN

## 2024-06-04 ASSESSMENT — PAIN DESCRIPTION - DESCRIPTORS
DESCRIPTORS: ACHING;DISCOMFORT
DESCRIPTORS: HEAVINESS

## 2024-06-04 ASSESSMENT — PAIN DESCRIPTION - FREQUENCY
FREQUENCY: CONTINUOUS
FREQUENCY: CONTINUOUS

## 2024-06-04 ASSESSMENT — PAIN - FUNCTIONAL ASSESSMENT: PAIN_FUNCTIONAL_ASSESSMENT: PREVENTS OR INTERFERES SOME ACTIVE ACTIVITIES AND ADLS

## 2024-06-04 ASSESSMENT — PAIN DESCRIPTION - ORIENTATION: ORIENTATION: MID

## 2024-06-04 NOTE — H&P
E-cigarette/Vaping Use Never User    Start Date     Passive Exposure No    Quit Date     Counseling Given Yes    Comments               Family History:      Reviewed and negative in regards to presenting illness/complaint.        Problem Relation Age of Onset    Diabetes Mother     High Cholesterol Mother     Stroke Mother     Cancer Mother     High Blood Pressure Mother     No Known Problems Paternal Grandfather         lung issues        REVIEW OF SYSTEMS COMPLETED:   Pertinent positives as noted in the HPI. All other systems reviewed and negative.    PHYSICAL EXAM PERFORMED:    BP (!) 195/81   Pulse 52   Temp 98 °F (36.7 °C) (Oral)   Resp 18   Ht 1.524 m (5')   Wt 51.7 kg (114 lb)   SpO2 100%   BMI 22.26 kg/m²     General appearance:  No apparent distress  HEENT:  Normal cephalic  Respiratory:  Normal respiratory effort. Clear to auscultation, bilaterally without Rales/Wheezes/Rhonchi.  Cardiovascular:  Regular rate and rhythm with normal S1/S2 without murmurs, rubs or gallops.  Abdomen: Soft, non-tender  Musculoskeletal:  No clubbing, cyanosis .  Neurologic: Focal weakness  Psychiatric:  Alert and oriented  Capillary Refill: Brisk,3 seconds, normal  Peripheral Pulses: +2 palpable, equal bilaterally       Labs:     Recent Labs     06/04/24 0103   WBC 6.1   HGB 10.2*   HCT 31.5*        Recent Labs     06/04/24 0138      K 3.1*      CO2 16*   BUN 33*   CREATININE 1.9*   CALCIUM 9.1     Recent Labs     06/04/24 0138   AST 14*   ALT <5*   BILITOT <0.2   ALKPHOS 143*     No results for input(s): \"INR\" in the last 72 hours.  Recent Labs     06/04/24  0103 06/04/24 0138 06/04/24  0650   TROPHS 36* 34* 29*       Urinalysis:      Lab Results   Component Value Date/Time    NITRU Negative 06/04/2024 01:03 AM    WBCUA 10-20 06/04/2024 01:03 AM    BACTERIA 2+ 06/04/2024 01:03 AM    RBCUA 3-4 06/04/2024 01:03 AM    BLOODU TRACE-INTACT 06/04/2024 01:03 AM    GLUCOSEU Negative 06/04/2024 01:03 AM

## 2024-06-04 NOTE — ED NOTES
Awakened easily  Skin warm and dry  states  pain is a 4  which is where ir has been  Sent with phone and slippers  Agreeable to admission

## 2024-06-04 NOTE — ED PROVIDER NOTES
[facility]  EMERGENCY DEPARTMENT ENCOUNTER        Pt Name: Maren Meza  MRN: [id number]  Birthdate 1955  Date of evaluation: 6/4/2024  Provider: Jeronimo Herrera MD  PCP: Marin Cardenas MD  Note Started: 3:25 AM EDT 6/4/24    CHIEF COMPLAINT       Chief Complaint   Patient presents with    Chest Pain     Pt  presents to ED with complaints of chest pain similar to past events. Reports pain started about an hour ago and is not getting better . Pt reports taking two nitro pills with little relief, now having the headaches. Denies and  sob, numbness and or tingling at this time .        HISTORY OF PRESENT ILLNESS: 1 or more Elements   History From: Patient        Maren Meza is a 68 y.o. female who presents for evaluation of substernal chest pain that began at rest 1 hour prior to arrival.  She reports the pains been persistent since onset.  She reports he took nitroglycerin without improvement.  She denies difficulties breathing fevers cough or radiation of the pain.  Has nausea vomiting or diaphoresis.  Reports she has had similar episodes of pain in the past when she had episodes of ACS.  Patient reports history of 2 heart attacks and previous stent placement.  Reports he has been compliant with her medications.     Last cardiac catheterization from 3/26/2024 read as:    ANGIOGRAM/CORONARY ARTERIOGRAM:        The left main coronary artery is very short without significant disease.     The left anterior descending artery has patent previously placed stent with mild mid vessel 20% ISR.     The left circumflex artery is non-dominant with small AV groove course with minor luminal irregularities.              OM1 has moderate ISR 40% mid vessel              OM2 has mild ISR 30% proximal and mid vessel     The right coronary artery is dominant vessel with widely patent stents.              RPDA has mild diffuse disease    Nursing Notes were all reviewed and agreed with or

## 2024-06-04 NOTE — CONSULTS
Cardiovascular Consultation     Attending Physician: Paul Carney MD    PATIENT: Maren Meza  : 1955  MRN: [id number]    Reason for Consultation:   Chief Complaint   Patient presents with    Chest Pain     Pt  presents to ED with complaints of chest pain similar to past events. Reports pain started about an hour ago and is not getting better . Pt reports taking two nitro pills with little relief, now having the headaches. Denies and  sob, numbness and or tingling at this time .        History of present illness:   Ms. Maren Meza is a 68 y.o. female patient with known CAD and prior PCI who follows with Dr. Donny Allred. Maren has chronic chest pain with frequent hospitalizations. She denies any change since her last angiogram in 2024 and admission in 2024. Reports medication compliance. States that chest pain was again central, radiating left, while watching TV. She found partial relief with SL Nitro at home. Denies palpitations, lightheadedness, or syncope and is without shortness of breath, PND, orthopnea, or LE edema.   Asks for \"pain medication that the other doctor won't give\".     Medical History:      Diagnosis Date    Acid reflux     Anemia     Anxiety and depression     Arthritis     Asthma     Atrial fibrillation (HCC)     CAD (coronary artery disease) 12/3/2012    Cerebral artery occlusion with cerebral infarction ([facility])     TIA\"\"S--right sided weakness & headache    CHF (congestive heart failure) ([facility])     Chronic kidney disease--stage III     40% kidney function    COPD (chronic obstructive pulmonary disease) ([facility])     DM2 (diabetes mellitus, type 2) ([facility])     Dysarthria     Fibromyalgia 2016    Headache(784.0) 2013    Hemisensory loss     History of blood transfusion 2020    pt denies having transfusion reaction    Hyperlipidemia     Hypertension     IBS (irritable bowel syndrome)     Inferior vena cava occlusion (HCC)     Keratitis     Meningioma

## 2024-06-04 NOTE — ED NOTES
Report called to 5N RN,  CMT slotted for  at 830 am. Pt resting in bed, still complaining of chest pain. Will administer meds per MAR.  Pt updated on POC and agreeable to it.  No needs voiced at this time.

## 2024-06-05 VITALS
BODY MASS INDEX: 21.68 KG/M2 | OXYGEN SATURATION: 100 % | HEIGHT: 60 IN | TEMPERATURE: 98.2 F | WEIGHT: 110.4 LBS | DIASTOLIC BLOOD PRESSURE: 68 MMHG | HEART RATE: 68 BPM | SYSTOLIC BLOOD PRESSURE: 139 MMHG | RESPIRATION RATE: 16 BRPM

## 2024-06-05 LAB
ALBUMIN SERPL-MCNC: 3.1 G/DL (ref 3.4–5)
ALBUMIN/GLOB SERPL: 0.8 {RATIO} (ref 1.1–2.2)
ALP SERPL-CCNC: 144 U/L (ref 40–129)
ALT SERPL-CCNC: 6 U/L (ref 10–40)
ANION GAP SERPL CALCULATED.3IONS-SCNC: 15 MMOL/L (ref 3–16)
AST SERPL-CCNC: 11 U/L (ref 15–37)
BACTERIA UR CULT: ABNORMAL
BASOPHILS # BLD: 0 K/UL (ref 0–0.2)
BASOPHILS NFR BLD: 0.6 %
BILIRUB SERPL-MCNC: <0.2 MG/DL (ref 0–1)
BUN SERPL-MCNC: 21 MG/DL (ref 7–20)
CALCIUM SERPL-MCNC: 9.4 MG/DL (ref 8.3–10.6)
CHLORIDE SERPL-SCNC: 108 MMOL/L (ref 99–110)
CO2 SERPL-SCNC: 18 MMOL/L (ref 21–32)
CREAT SERPL-MCNC: 1.3 MG/DL (ref 0.6–1.2)
DEPRECATED RDW RBC AUTO: 14.8 % (ref 12.4–15.4)
EOSINOPHIL # BLD: 0.2 K/UL (ref 0–0.6)
EOSINOPHIL NFR BLD: 3.6 %
GFR SERPLBLD CREATININE-BSD FMLA CKD-EPI: 45 ML/MIN/{1.73_M2}
GLUCOSE BLD-MCNC: 100 MG/DL (ref 70–99)
GLUCOSE BLD-MCNC: 84 MG/DL (ref 70–99)
GLUCOSE BLD-MCNC: 85 MG/DL (ref 70–99)
GLUCOSE SERPL-MCNC: 77 MG/DL (ref 70–99)
HCT VFR BLD AUTO: 25.8 % (ref 36–48)
HGB BLD-MCNC: 8.6 G/DL (ref 12–16)
LYMPHOCYTES # BLD: 1.1 K/UL (ref 1–5.1)
LYMPHOCYTES NFR BLD: 25.5 %
MAGNESIUM SERPL-MCNC: 1.7 MG/DL (ref 1.8–2.4)
MCH RBC QN AUTO: 30.2 PG (ref 26–34)
MCHC RBC AUTO-ENTMCNC: 33.3 G/DL (ref 31–36)
MCV RBC AUTO: 90.5 FL (ref 80–100)
MONOCYTES # BLD: 0.2 K/UL (ref 0–1.3)
MONOCYTES NFR BLD: 5 %
NEUTROPHILS # BLD: 2.8 K/UL (ref 1.7–7.7)
NEUTROPHILS NFR BLD: 65.3 %
ORGANISM: ABNORMAL
PERFORMED ON: ABNORMAL
PERFORMED ON: NORMAL
PERFORMED ON: NORMAL
PLATELET # BLD AUTO: 251 K/UL (ref 135–450)
PMV BLD AUTO: 8.3 FL (ref 5–10.5)
POTASSIUM SERPL-SCNC: 3.3 MMOL/L (ref 3.5–5.1)
PROT SERPL-MCNC: 6.9 G/DL (ref 6.4–8.2)
RBC # BLD AUTO: 2.85 M/UL (ref 4–5.2)
SODIUM SERPL-SCNC: 141 MMOL/L (ref 136–145)
WBC # BLD AUTO: 4.2 K/UL (ref 4–11)

## 2024-06-05 PROCEDURE — 80053 COMPREHEN METABOLIC PANEL: CPT

## 2024-06-05 PROCEDURE — 6370000000 HC RX 637 (ALT 250 FOR IP): Performed by: INTERNAL MEDICINE

## 2024-06-05 PROCEDURE — G0378 HOSPITAL OBSERVATION PER HR: HCPCS

## 2024-06-05 PROCEDURE — 85025 COMPLETE CBC W/AUTO DIFF WBC: CPT

## 2024-06-05 PROCEDURE — 83735 ASSAY OF MAGNESIUM: CPT

## 2024-06-05 PROCEDURE — 6360000002 HC RX W HCPCS: Performed by: INTERNAL MEDICINE

## 2024-06-05 PROCEDURE — 36415 COLL VENOUS BLD VENIPUNCTURE: CPT

## 2024-06-05 PROCEDURE — 94760 N-INVAS EAR/PLS OXIMETRY 1: CPT

## 2024-06-05 PROCEDURE — 96376 TX/PRO/DX INJ SAME DRUG ADON: CPT

## 2024-06-05 RX ORDER — POTASSIUM CHLORIDE 20 MEQ/1
40 TABLET, EXTENDED RELEASE ORAL ONCE
Status: COMPLETED | OUTPATIENT
Start: 2024-06-05 | End: 2024-06-05

## 2024-06-05 RX ORDER — INSULIN GLARGINE 100 [IU]/ML
8 INJECTION, SOLUTION SUBCUTANEOUS NIGHTLY
Qty: 8 ADJUSTABLE DOSE PRE-FILLED PEN SYRINGE | Refills: 0 | Status: SHIPPED | OUTPATIENT
Start: 2024-06-05

## 2024-06-05 RX ORDER — MAGNESIUM SULFATE 1 G/100ML
1000 INJECTION INTRAVENOUS ONCE
Status: DISCONTINUED | OUTPATIENT
Start: 2024-06-05 | End: 2024-06-05 | Stop reason: HOSPADM

## 2024-06-05 RX ORDER — LANOLIN ALCOHOL/MO/W.PET/CERES
400 CREAM (GRAM) TOPICAL ONCE
Status: COMPLETED | OUTPATIENT
Start: 2024-06-05 | End: 2024-06-05

## 2024-06-05 RX ORDER — CEFUROXIME AXETIL 500 MG/1
500 TABLET ORAL EVERY 12 HOURS SCHEDULED
Qty: 9 TABLET | Refills: 0 | Status: SHIPPED | OUTPATIENT
Start: 2024-06-05 | End: 2024-06-10

## 2024-06-05 RX ORDER — LEVOFLOXACIN 250 MG/1
250 TABLET, FILM COATED ORAL DAILY
Status: DISCONTINUED | OUTPATIENT
Start: 2024-06-05 | End: 2024-06-05

## 2024-06-05 RX ORDER — L. ACIDOPHILUS/L.BULGARICUS 100MM CELL
1 GRANULES IN PACKET (EA) ORAL 2 TIMES DAILY
Qty: 30 PACKET | Refills: 0 | Status: SHIPPED | OUTPATIENT
Start: 2024-06-05

## 2024-06-05 RX ORDER — CEFUROXIME AXETIL 250 MG/1
500 TABLET ORAL EVERY 12 HOURS SCHEDULED
Status: DISCONTINUED | OUTPATIENT
Start: 2024-06-05 | End: 2024-06-05 | Stop reason: HOSPADM

## 2024-06-05 RX ADMIN — MONTELUKAST 10 MG: 10 TABLET, FILM COATED ORAL at 08:08

## 2024-06-05 RX ADMIN — LOSARTAN POTASSIUM 50 MG: 50 TABLET, FILM COATED ORAL at 08:08

## 2024-06-05 RX ADMIN — CEFUROXIME AXETIL 500 MG: 250 TABLET ORAL at 12:28

## 2024-06-05 RX ADMIN — FERROUS SULFATE TAB 325 MG (65 MG ELEMENTAL FE) 325 MG: 325 (65 FE) TAB at 08:07

## 2024-06-05 RX ADMIN — MIDODRINE HYDROCHLORIDE 5 MG: 5 TABLET ORAL at 08:08

## 2024-06-05 RX ADMIN — LEVETIRACETAM 500 MG: 500 TABLET, FILM COATED ORAL at 08:07

## 2024-06-05 RX ADMIN — TICAGRELOR 90 MG: 90 TABLET ORAL at 08:08

## 2024-06-05 RX ADMIN — Medication 400 MG: at 17:52

## 2024-06-05 RX ADMIN — METOPROLOL SUCCINATE 50 MG: 50 TABLET, EXTENDED RELEASE ORAL at 08:08

## 2024-06-05 RX ADMIN — FLUTICASONE PROPIONATE 1 SPRAY: 50 SPRAY, METERED NASAL at 08:09

## 2024-06-05 RX ADMIN — POTASSIUM CHLORIDE 40 MEQ: 1500 TABLET, EXTENDED RELEASE ORAL at 12:02

## 2024-06-05 RX ADMIN — ISOSORBIDE MONONITRATE 60 MG: 60 TABLET, EXTENDED RELEASE ORAL at 08:07

## 2024-06-05 RX ADMIN — DOCUSATE SODIUM 100 MG: 100 CAPSULE, LIQUID FILLED ORAL at 08:07

## 2024-06-05 RX ADMIN — BUSPIRONE HYDROCHLORIDE 5 MG: 5 TABLET ORAL at 08:07

## 2024-06-05 RX ADMIN — ASPIRIN 81 MG: 81 TABLET, CHEWABLE ORAL at 08:08

## 2024-06-05 RX ADMIN — CETIRIZINE HYDROCHLORIDE 5 MG: 10 TABLET, FILM COATED ORAL at 08:07

## 2024-06-05 RX ADMIN — PANTOPRAZOLE SODIUM 40 MG: 40 TABLET, DELAYED RELEASE ORAL at 05:49

## 2024-06-05 RX ADMIN — DULOXETINE HYDROCHLORIDE 60 MG: 60 CAPSULE, DELAYED RELEASE ORAL at 08:08

## 2024-06-05 RX ADMIN — RANOLAZINE 500 MG: 500 TABLET, FILM COATED, EXTENDED RELEASE ORAL at 08:08

## 2024-06-05 RX ADMIN — HEPARIN SODIUM 5000 UNITS: 5000 INJECTION INTRAVENOUS; SUBCUTANEOUS at 05:49

## 2024-06-05 RX ADMIN — NIFEDIPINE 30 MG: 30 TABLET, EXTENDED RELEASE ORAL at 08:08

## 2024-06-05 ASSESSMENT — PAIN SCALES - WONG BAKER
WONGBAKER_NUMERICALRESPONSE: NO HURT

## 2024-06-05 NOTE — ACP (ADVANCE CARE PLANNING)
Advance Care Planning     Advance Care Planning Activator (Inpatient)  Conversation Note      Date of ACP Conversation: 6/5/2024     Conversation Conducted with: Patient with Decision Making Capacity    ACP Activator: CUATE Purdy    Health Care Decision Maker:     Current Designated Health Care Decision Maker:     Primary Decision Maker: Neida Fisher - Child - [phone]    Secondary Decision Maker: Ester Jenkins - Child - [phone]    Secondary Decision Maker: SusanaNilesh francis - Child - [phone]    Today we documented Decision Maker(s) consistent with ACP documents on file.    Care Preferences    Ventilation:  \"If you were in your present state of health and suddenly became very ill and were unable to breathe on your own, what would your preference be about the use of a ventilator (breathing machine) if it were available to you?\"      Would the patient desire the use of ventilator (breathing machine)?: yes    \"If your health worsens and it becomes clear that your chance of recovery is unlikely, what would your preference be about the use of a ventilator (breathing machine) if it were available to you?\"     Would the patient desire the use of ventilator (breathing machine)?: No      Resuscitation  \"CPR works best to restart the heart when there is a sudden event, like a heart attack, in someone who is otherwise healthy. Unfortunately, CPR does not typically restart the heart for people who have serious health conditions or who are very sick.\"    \"In the event your heart stopped as a result of an underlying serious health condition, would you want attempts to be made to restart your heart (answer \"yes\" for attempt to resuscitate) or would you prefer a natural death (answer \"no\" for do not attempt to resuscitate)?\" yes       [] Yes   [] No   Educated Patient / Decision Maker regarding differences between Advance Directives and portable DNR orders.    Length of ACP Conversation in minutes:

## 2024-06-05 NOTE — CARE COORDINATION
Case Management Assessment  Initial Evaluation    Date/Time of Evaluation: 6/5/2024 2:45 PM  Assessment Completed by: CUATE Purdy    If patient is discharged prior to next notation, then this note serves as note for discharge by case management.    Patient Name: Maren Meza                   YOB: 1955  Diagnosis: Chest pain [R07.9]  KOLBY (acute kidney injury) (HCC) [N17.9]  Elevated brain natriuretic peptide (BNP) level [R79.89]  Chest pain, unspecified type [R07.9]                   Date / Time: 6/4/2024 12:15 AM    Patient Admission Status: Observation   Readmission Risk (Low < 19, Mod (19-27), High > 27): Readmission Risk Score: 18    Current PCP: Marin Cardenas MD  PCP verified by CM? (P) Yes    Chart Reviewed: Yes      History Provided by: (P) Patient  Patient Orientation: (P) Alert and Oriented    Patient Cognition: (P) Alert    Hospitalization in the last 30 days (Readmission):  No    If yes, Readmission Assessment in CM Navigator will be completed.    Advance Directives:      Code Status: Full Code   Patient's Primary Decision Maker is: (P) Named in Scanned ACP Document    Primary Decision Maker: Neida Fisher - Child - [phone]    Secondary Decision Maker: Ester Jenkins - Child - [phone]    Secondary Decision Maker: Nilesh Meza - Child - [phone]    Discharge Planning:    Patient lives with: (P) Children Type of Home: (P) Apartment  Primary Care Giver: (P) Self  Patient Support Systems include: (P) Children, Family Members   Current Financial resources: (P) Medicare, Medicaid  Current community resources: (P) ECF/Home Care  Current services prior to admission: (P) Durable Medical Equipment, Home Care            Current DME: (P) Walker            Type of Home Care services:  (P) OT, PT, Meals on Wheels, Nursing Services    ADLS  Prior functional level: (P) Independent in ADLs/IADLs  Current functional level: (P) Independent in ADLs/IADLs    PT AM-PAC: 20

## 2024-06-05 NOTE — PROGRESS NOTES
4 Eyes Skin Assessment     NAME:  Maren Meza  YOB: 1955  MEDICAL RECORD NUMBER:  [id number]    The patient is being assessed for  Admission    I agree that at least one RN has performed a thorough Head to Toe Skin Assessment on the patient. ALL assessment sites listed below have been assessed.      Areas assessed by both nurses:    Head, Face, Ears, Shoulders, Back, Chest, Arms, Elbows, Hands, Sacrum. Buttock, Coccyx, Ischium, Legs. Feet and Heels, and Under Medical Devices         Does the Patient have a Wound? No noted wound(s)       Rakan Prevention initiated by RN: No  Wound Care Orders initiated by RN: No    Pressure Injury (Stage 3,4, Unstageable, DTI, NWPT, and Complex wounds) if present, place Wound referral order by RN under : No    New Ostomies, if present place, Ostomy referral order under : No     Nurse 1 eSignature: Electronically signed by Alesha Feng RN on 6/4/24 at 11:44 AM EDT    **SHARE this note so that the co-signing nurse can place an eSignature**    Nurse 2 eSignature: Electronically signed by Devorah Escoto RN on 6/5/24 at 6:57 AM EDT   
Call placed to insurance to get ride scheduled for patient. No complications.    Electronically signed by Annie Escoto RN on 6/5/2024 at 6:08 PM    
Discharge orders acknowledged by RN . Discharge teaching completed with pt and family. AVS reviewed and all questions answered. Medication regimen reviewed and pt understands schedule. Pt was sent electronic to be filled and understands schedule. IV removed. Bedside monitor removed from pt. Pt vitals WDL. Pt discharged with all belongings to home. Pt transported off of unit via wheelchair. No complications.     Electronically signed by Annie Escoto RN on 6/5/2024 at 6:07 PM    
Patient transferred to room 5257 from [facility]. Patient A/O x4. Patient blood pressure elevated and HR is sinus kaden. MD notified. She does not voice any questions or concerns at this time. Call light within reach. Bed alarm on for safety.   
Physical Therapy  Facility/Department: [address] PROGRESSIVE CARE  Physical Therapy Initial Assessment    Name: Maren Meza  : 1955  MRN: [id number]  Date of Service: 2024    Discharge Recommendations:  Patient would benefit from continued therapy after discharge, Home with Home health PT, Home with assist PRN   PT Equipment Recommendations  Equipment Needed: No      Patient Diagnosis(es): The primary encounter diagnosis was Chest pain, unspecified type. Diagnoses of KOLBY (acute kidney injury) (HCC) and Elevated brain natriuretic peptide (BNP) level were also pertinent to this visit.  Past Medical History:  has a past medical history of Acid reflux, Anemia, Anxiety and depression, Arthritis, Asthma, Atrial fibrillation (HCC), CAD (coronary artery disease), Cerebral artery occlusion with cerebral infarction (HCC), CHF (congestive heart failure) (HCC), Chronic kidney disease--stage III, COPD (chronic obstructive pulmonary disease) ([facility]), DM2 (diabetes mellitus, type 2) ([facility]), Dysarthria, Fibromyalgia, Headache(784.0), Hemisensory loss, History of blood transfusion, Hyperlipidemia, Hypertension, IBS (irritable bowel syndrome), Inferior vena cava occlusion (HCC), Keratitis, Meningioma (HCC), MI, old, Neuropathy, Superior vena cava obstruction, Temporal arteritis (HCC), and Wears glasses.  Past Surgical History:  has a past surgical history that includes Tunneled venous port placement; Cholecystectomy; ablation of dysrhythmic focus (  and 2020); Cataract removal (Bilateral); joint replacement (Right); Hysterectomy; Artery Biopsy (Right, 2014); Coronary angioplasty with stent (); Knee arthroscopy (Right); Percutaneous Transluminal Coronary Angio (10/2019); Upper gastrointestinal endoscopy (N/A, 2020); Carotid artery surgery (Left); Colonoscopy (N/A, 2021); and Colonoscopy (N/A, 10/15/2021).    Assessment   Body Structures, Functions, Activity Limitations Requiring Skilled Therapeutic 
Resting well through night. Denies complaints of chest pain but did report on-going HA at HS. Medicated with PRN tramadol.  Reports slightly improved with interaction but remains. BP remains elevated. Did request I hold metoprolol at HS due to HR being low 50's most of afternoon.Devorah Escoto RN   
toileting w/ Ind  Short Term Goal 2: pt will complete LB dressing w/ Ind  Short Term Goal 3: pt will complete bathing w/ Ind  Short Term Goal 4: pt will complete fxl ADL transfers w/ Ind  Long Term Goals  Time Frame for Long Term Goals : LTG=STG  Patient Goals   Patient goals : to return home       Therapy Time   Individual Concurrent Group Co-treatment   Time In 1355         Time Out 1410         Minutes 15         Timed Code Treatment Minutes:  (15 min eval)       Jay Lopes, OTR.L [id number]     
Clear

## 2024-06-05 NOTE — PLAN OF CARE

## 2024-06-05 NOTE — PLAN OF CARE
Problem: Discharge Planning  Goal: Discharge to home or other facility with appropriate resources  6/5/2024 0218 by Devorah Escoto RN  Outcome: Progressing  Flowsheets (Taken 6/5/2024 0218)  Discharge to home or other facility with appropriate resources:   Identify barriers to discharge with patient and caregiver   Arrange for needed discharge resources and transportation as appropriate   Identify discharge learning needs (meds, wound care, etc)     Problem: Pain  Goal: Verbalizes/displays adequate comfort level or baseline comfort level  6/5/2024 0218 by Devorah Escoto RN  Outcome: Progressing  Flowsheets (Taken 6/5/2024 0218)  Verbalizes/displays adequate comfort level or baseline comfort level:   Encourage patient to monitor pain and request assistance   Assess pain using appropriate pain scale   Administer analgesics based on type and severity of pain and evaluate response     Problem: Safety - Adult  Goal: Free from fall injury  6/5/2024 0218 by Devorah Escoto RN  Outcome: Progressing  Flowsheets (Taken 6/5/2024 0218)  Free From Fall Injury: Instruct family/caregiver on patient safety     Problem: Skin/Tissue Integrity  Goal: Absence of new skin breakdown  6/5/2024 0218 by Devorah Escoto RN  Outcome: Progressing     Problem: Chronic Conditions and Co-morbidities  Goal: Patient's chronic conditions and co-morbidity symptoms are monitored and maintained or improved  6/5/2024 0218 by Devorah Escoto RN  Outcome: Progressing  Flowsheets (Taken 6/5/2024 0218)  Care Plan - Patient's Chronic Conditions and Co-Morbidity Symptoms are Monitored and Maintained or Improved:   Monitor and assess patient's chronic conditions and comorbid symptoms for stability, deterioration, or improvement   Collaborate with multidisciplinary team to address chronic and comorbid conditions and prevent exacerbation or deterioration

## 2024-06-05 NOTE — DISCHARGE SUMMARY
Hospital Medicine Discharge Summary    Patient ID: Maren Meza      Patient's PCP: Marin Cardenas MD    Admit Date: 6/4/2024     Discharge Date:   06/05/2024    Admitting Provider: Angelita Carrasco DO     Discharge Provider: Paul Carney MD     Discharge Diagnoses:       Active Hospital Problems    Diagnosis     Chest pain [R07.9]      Priority: Medium       The patient was seen and examined on day of discharge and this discharge summary is in conjunction with any daily progress note from day of discharge.    Hospital Course:     From HPI:\"68 y.o. female who presented to [facility] with chest pain, started at rest yesterday, intermittent, up to 8 out of 10 intensity retrosternal, not associated with shortness of breath nonradiating no obvious trigger no obvious aggravating factors, troponin minimally elevated and flat EKG nonspecific denies any fever chills nausea or vomiting to note that she had a mechanical fall last week. \"    Chest pain, nonspecific retrosternal appears musculoskeletal troponin minimally elevated cardiology consulted and no further procedure planned pain deemed noncardiac resolved  Coronary artery disease, cardiology consulted  Anxiety relatively stable  Fibromyalgia continue home regimen  Diabetes mellitus sliding scale  Cystitis mild p.o. antibiotics for few days          Physical Exam Performed:     BP (!) 140/64   Pulse 74   Temp 98.7 °F (37.1 °C) (Oral)   Resp 16   Ht 1.524 m (5')   Wt 50.1 kg (110 lb 6.4 oz)   SpO2 98%   BMI 21.56 kg/m²       General appearance:  No apparent distress  Neck: Supple  Respiratory:  Normal respiratory effort. Clear to auscultation, bilaterally without Rales/Wheezes/Rhonchi.  Cardiovascular:  Regular rate and rhythm with normal S1/S2 without murmurs, rubs or gallops.  Abdomen: Soft, non-tender  Musculoskeletal:  No clubbing, cyanosis   Neurologic: No focal weakness  Psychiatric:  Alert and oriented  Capillary Refill: Brisk,< 3 seconds

## 2024-06-05 NOTE — CARE COORDINATION
Discharge planning:   [place] [facility]:   Spoke to Zoya, #[phone]  F: [phone]     Notified of discharge planned for today. Confirmed services will resume. After visit summary sent via V[facility].     CUATE Purdy, LSW, Social Work/Case Management   [phone]  Electronically signed by CUATE Purdy on 6/5/2024 at 3:09 PM

## 2024-06-06 ENCOUNTER — TELEPHONE (OUTPATIENT)
Dept: PRIMARY CARE CLINIC | Age: 69
End: 2024-06-06

## 2024-06-06 NOTE — TELEPHONE ENCOUNTER
Care Transitions Initial Follow Up Call    Outreach made within 2 business days of discharge: Yes    Patient: Maren Meza Patient : 1955   MRN: [id number]  Reason for Admission: There are no discharge diagnoses documented for the most recent discharge.  Discharge Date: 24       Spoke with: Maren    Discharge department/facility: [facility] Patient Contact:  Was patient able to fill all prescriptions: Yes  Was patient instructed to bring all medications to the follow-up visit: Yes  Is patient taking all medications as directed in the discharge summary? Yes  Does patient understand their discharge instructions: Yes  Does patient have questions or concerns that need addressed prior to 7-14 day follow up office visit: no    Scheduled appointment with PCP within 7-14 days    Follow Up  Future Appointments   Date Time Provider Department Center   2024  3:00 PM Harris Camara MD [address] [facility] Nika Chow MA

## 2024-06-27 ENCOUNTER — APPOINTMENT (OUTPATIENT)
Dept: GENERAL RADIOLOGY | Age: 69
DRG: 378 | End: 2024-06-27
Payer: COMMERCIAL

## 2024-06-27 ENCOUNTER — HOSPITAL ENCOUNTER (INPATIENT)
Age: 69
LOS: 2 days | Discharge: HOME HEALTH | DRG: 378 | End: 2024-07-03
Attending: EMERGENCY MEDICINE | Admitting: INTERNAL MEDICINE
Payer: COMMERCIAL

## 2024-06-27 DIAGNOSIS — R07.9 CHEST PAIN, UNSPECIFIED TYPE: Primary | ICD-10-CM

## 2024-06-27 DIAGNOSIS — K92.1 MELENA: ICD-10-CM

## 2024-06-27 LAB
ALBUMIN SERPL-MCNC: 3.4 G/DL (ref 3.4–5)
ALBUMIN/GLOB SERPL: 0.9 {RATIO} (ref 1.1–2.2)
ALP SERPL-CCNC: 145 U/L (ref 40–129)
ALT SERPL-CCNC: 11 U/L (ref 10–40)
ANION GAP SERPL CALCULATED.3IONS-SCNC: 18 MMOL/L (ref 3–16)
AST SERPL-CCNC: 19 U/L (ref 15–37)
BASOPHILS # BLD: 0 K/UL (ref 0–0.2)
BASOPHILS NFR BLD: 0.4 %
BILIRUB SERPL-MCNC: <0.2 MG/DL (ref 0–1)
BUN SERPL-MCNC: 34 MG/DL (ref 7–20)
CALCIUM SERPL-MCNC: 9.1 MG/DL (ref 8.3–10.6)
CHLORIDE SERPL-SCNC: 99 MMOL/L (ref 99–110)
CO2 SERPL-SCNC: 18 MMOL/L (ref 21–32)
CREAT SERPL-MCNC: 1.8 MG/DL (ref 0.6–1.2)
D-DIMER QUANTITATIVE: 0.6 UG/ML FEU (ref 0–0.6)
DEPRECATED RDW RBC AUTO: 15.8 % (ref 12.4–15.4)
EOSINOPHIL # BLD: 0.1 K/UL (ref 0–0.6)
EOSINOPHIL NFR BLD: 2.8 %
GFR SERPLBLD CREATININE-BSD FMLA CKD-EPI: 30 ML/MIN/{1.73_M2}
GLUCOSE BLD-MCNC: 323 MG/DL (ref 70–99)
GLUCOSE SERPL-MCNC: 378 MG/DL (ref 70–99)
HCT VFR BLD AUTO: 29.4 % (ref 36–48)
HGB BLD-MCNC: 9.7 G/DL (ref 12–16)
LIPASE SERPL-CCNC: 17 U/L (ref 13–60)
LYMPHOCYTES # BLD: 1.1 K/UL (ref 1–5.1)
LYMPHOCYTES NFR BLD: 21 %
MAGNESIUM SERPL-MCNC: 1.9 MG/DL (ref 1.8–2.4)
MCH RBC QN AUTO: 30.2 PG (ref 26–34)
MCHC RBC AUTO-ENTMCNC: 33.1 G/DL (ref 31–36)
MCV RBC AUTO: 91.2 FL (ref 80–100)
MONOCYTES # BLD: 0.2 K/UL (ref 0–1.3)
MONOCYTES NFR BLD: 3.9 %
NEUTROPHILS # BLD: 3.9 K/UL (ref 1.7–7.7)
NEUTROPHILS NFR BLD: 71.9 %
NT-PROBNP SERPL-MCNC: 1705 PG/ML (ref 0–124)
PERFORMED ON: ABNORMAL
PLATELET # BLD AUTO: 233 K/UL (ref 135–450)
PMV BLD AUTO: 7.6 FL (ref 5–10.5)
POTASSIUM SERPL-SCNC: 3.5 MMOL/L (ref 3.5–5.1)
PROT SERPL-MCNC: 7.3 G/DL (ref 6.4–8.2)
RBC # BLD AUTO: 3.22 M/UL (ref 4–5.2)
SODIUM SERPL-SCNC: 135 MMOL/L (ref 136–145)
TROPONIN, HIGH SENSITIVITY: 41 NG/L (ref 0–14)
TROPONIN, HIGH SENSITIVITY: 43 NG/L (ref 0–14)
WBC # BLD AUTO: 5.4 K/UL (ref 4–11)

## 2024-06-27 PROCEDURE — 80053 COMPREHEN METABOLIC PANEL: CPT

## 2024-06-27 PROCEDURE — 83880 ASSAY OF NATRIURETIC PEPTIDE: CPT

## 2024-06-27 PROCEDURE — 93005 ELECTROCARDIOGRAM TRACING: CPT | Performed by: EMERGENCY MEDICINE

## 2024-06-27 PROCEDURE — 96374 THER/PROPH/DIAG INJ IV PUSH: CPT

## 2024-06-27 PROCEDURE — 85025 COMPLETE CBC W/AUTO DIFF WBC: CPT

## 2024-06-27 PROCEDURE — 99285 EMERGENCY DEPT VISIT HI MDM: CPT

## 2024-06-27 PROCEDURE — 6360000002 HC RX W HCPCS: Performed by: EMERGENCY MEDICINE

## 2024-06-27 PROCEDURE — 85379 FIBRIN DEGRADATION QUANT: CPT

## 2024-06-27 PROCEDURE — 83690 ASSAY OF LIPASE: CPT

## 2024-06-27 PROCEDURE — 83735 ASSAY OF MAGNESIUM: CPT

## 2024-06-27 PROCEDURE — 6370000000 HC RX 637 (ALT 250 FOR IP): Performed by: EMERGENCY MEDICINE

## 2024-06-27 PROCEDURE — G0378 HOSPITAL OBSERVATION PER HR: HCPCS

## 2024-06-27 PROCEDURE — 71045 X-RAY EXAM CHEST 1 VIEW: CPT

## 2024-06-27 PROCEDURE — 84484 ASSAY OF TROPONIN QUANT: CPT

## 2024-06-27 RX ORDER — ACETAMINOPHEN 325 MG/1
650 TABLET ORAL EVERY 6 HOURS PRN
Status: DISCONTINUED | OUTPATIENT
Start: 2024-06-27 | End: 2024-07-03 | Stop reason: HOSPADM

## 2024-06-27 RX ORDER — HYDRALAZINE HYDROCHLORIDE 25 MG/1
25 TABLET, FILM COATED ORAL 3 TIMES DAILY
COMMUNITY

## 2024-06-27 RX ORDER — LEVETIRACETAM 500 MG/1
500 TABLET ORAL 2 TIMES DAILY
Status: DISCONTINUED | OUTPATIENT
Start: 2024-06-28 | End: 2024-07-03 | Stop reason: HOSPADM

## 2024-06-27 RX ORDER — HYDROXYZINE HYDROCHLORIDE 25 MG/1
25 TABLET, FILM COATED ORAL 3 TIMES DAILY PRN
Status: DISCONTINUED | OUTPATIENT
Start: 2024-06-27 | End: 2024-07-03 | Stop reason: HOSPADM

## 2024-06-27 RX ORDER — ACETAMINOPHEN 650 MG/1
650 SUPPOSITORY RECTAL EVERY 6 HOURS PRN
Status: DISCONTINUED | OUTPATIENT
Start: 2024-06-27 | End: 2024-07-03 | Stop reason: HOSPADM

## 2024-06-27 RX ORDER — ATORVASTATIN CALCIUM 40 MG/1
80 TABLET, FILM COATED ORAL NIGHTLY
Status: DISCONTINUED | OUTPATIENT
Start: 2024-06-28 | End: 2024-07-03 | Stop reason: HOSPADM

## 2024-06-27 RX ORDER — HYDROCODONE BITARTRATE AND ACETAMINOPHEN 5; 325 MG/1; MG/1
1 TABLET ORAL
Status: COMPLETED | OUTPATIENT
Start: 2024-06-27 | End: 2024-06-27

## 2024-06-27 RX ORDER — INSULIN LISPRO 100 [IU]/ML
4 INJECTION, SOLUTION INTRAVENOUS; SUBCUTANEOUS
Status: DISCONTINUED | OUTPATIENT
Start: 2024-06-28 | End: 2024-07-03 | Stop reason: HOSPADM

## 2024-06-27 RX ORDER — INSULIN LISPRO 100 [IU]/ML
0-4 INJECTION, SOLUTION INTRAVENOUS; SUBCUTANEOUS
Status: DISCONTINUED | OUTPATIENT
Start: 2024-06-28 | End: 2024-07-03 | Stop reason: HOSPADM

## 2024-06-27 RX ORDER — QUETIAPINE FUMARATE 25 MG/1
50 TABLET, FILM COATED ORAL NIGHTLY
Status: DISCONTINUED | OUTPATIENT
Start: 2024-06-28 | End: 2024-07-03 | Stop reason: HOSPADM

## 2024-06-27 RX ORDER — PREDNISONE 10 MG/1
10 TABLET ORAL DAILY
COMMUNITY

## 2024-06-27 RX ORDER — INSULIN GLARGINE 100 [IU]/ML
8 INJECTION, SOLUTION SUBCUTANEOUS 2 TIMES DAILY
Status: DISCONTINUED | OUTPATIENT
Start: 2024-06-28 | End: 2024-06-30

## 2024-06-27 RX ORDER — BUDESONIDE AND FORMOTEROL FUMARATE DIHYDRATE 160; 4.5 UG/1; UG/1
2 AEROSOL RESPIRATORY (INHALATION)
Status: DISCONTINUED | OUTPATIENT
Start: 2024-06-28 | End: 2024-06-27 | Stop reason: CLARIF

## 2024-06-27 RX ORDER — HYDRALAZINE HYDROCHLORIDE 25 MG/1
25 TABLET, FILM COATED ORAL 3 TIMES DAILY
Status: DISCONTINUED | OUTPATIENT
Start: 2024-06-28 | End: 2024-07-03 | Stop reason: HOSPADM

## 2024-06-27 RX ORDER — SODIUM CHLORIDE 0.9 % (FLUSH) 0.9 %
10 SYRINGE (ML) INJECTION PRN
Status: DISCONTINUED | OUTPATIENT
Start: 2024-06-27 | End: 2024-07-03 | Stop reason: HOSPADM

## 2024-06-27 RX ORDER — RANOLAZINE 500 MG/1
1000 TABLET, EXTENDED RELEASE ORAL 2 TIMES DAILY
Status: DISCONTINUED | OUTPATIENT
Start: 2024-06-28 | End: 2024-07-03 | Stop reason: HOSPADM

## 2024-06-27 RX ORDER — ONDANSETRON 2 MG/ML
4 INJECTION INTRAMUSCULAR; INTRAVENOUS EVERY 6 HOURS PRN
Status: DISCONTINUED | OUTPATIENT
Start: 2024-06-27 | End: 2024-06-29

## 2024-06-27 RX ORDER — TRAMADOL HYDROCHLORIDE 50 MG/1
50 TABLET ORAL EVERY 6 HOURS PRN
Status: DISCONTINUED | OUTPATIENT
Start: 2024-06-27 | End: 2024-06-30

## 2024-06-27 RX ORDER — SODIUM CHLORIDE 9 MG/ML
INJECTION, SOLUTION INTRAVENOUS PRN
Status: DISCONTINUED | OUTPATIENT
Start: 2024-06-27 | End: 2024-07-03 | Stop reason: HOSPADM

## 2024-06-27 RX ORDER — MONTELUKAST SODIUM 10 MG/1
10 TABLET ORAL NIGHTLY
Status: DISCONTINUED | OUTPATIENT
Start: 2024-06-28 | End: 2024-07-03 | Stop reason: HOSPADM

## 2024-06-27 RX ORDER — GLUCAGON 1 MG/ML
1 KIT INJECTION PRN
Status: DISCONTINUED | OUTPATIENT
Start: 2024-06-27 | End: 2024-07-03 | Stop reason: HOSPADM

## 2024-06-27 RX ORDER — ASPIRIN 81 MG/1
81 TABLET, CHEWABLE ORAL DAILY
Status: DISCONTINUED | OUTPATIENT
Start: 2024-06-28 | End: 2024-07-03 | Stop reason: HOSPADM

## 2024-06-27 RX ORDER — SODIUM CHLORIDE 0.9 % (FLUSH) 0.9 %
10 SYRINGE (ML) INJECTION EVERY 12 HOURS SCHEDULED
Status: DISCONTINUED | OUTPATIENT
Start: 2024-06-28 | End: 2024-07-03 | Stop reason: HOSPADM

## 2024-06-27 RX ORDER — ALBUTEROL SULFATE 2.5 MG/3ML
2.5 SOLUTION RESPIRATORY (INHALATION) EVERY 4 HOURS PRN
Status: DISCONTINUED | OUTPATIENT
Start: 2024-06-27 | End: 2024-07-03 | Stop reason: HOSPADM

## 2024-06-27 RX ORDER — SENNOSIDES A AND B 8.6 MG/1
1 TABLET, FILM COATED ORAL DAILY PRN
Status: DISCONTINUED | OUTPATIENT
Start: 2024-06-27 | End: 2024-06-29

## 2024-06-27 RX ORDER — DEXTROSE MONOHYDRATE 100 MG/ML
INJECTION, SOLUTION INTRAVENOUS CONTINUOUS PRN
Status: DISCONTINUED | OUTPATIENT
Start: 2024-06-27 | End: 2024-07-03 | Stop reason: HOSPADM

## 2024-06-27 RX ORDER — ISOSORBIDE MONONITRATE 30 MG/1
60 TABLET, EXTENDED RELEASE ORAL 2 TIMES DAILY
Status: DISCONTINUED | OUTPATIENT
Start: 2024-06-28 | End: 2024-07-03 | Stop reason: HOSPADM

## 2024-06-27 RX ORDER — AMITRIPTYLINE HYDROCHLORIDE 10 MG/1
40 TABLET, FILM COATED ORAL NIGHTLY
Status: DISCONTINUED | OUTPATIENT
Start: 2024-06-28 | End: 2024-07-03 | Stop reason: HOSPADM

## 2024-06-27 RX ORDER — ONDANSETRON 2 MG/ML
4 INJECTION INTRAMUSCULAR; INTRAVENOUS ONCE
Status: COMPLETED | OUTPATIENT
Start: 2024-06-27 | End: 2024-06-27

## 2024-06-27 RX ORDER — NITROGLYCERIN 0.4 MG/1
0.4 TABLET SUBLINGUAL EVERY 5 MIN PRN
Status: DISCONTINUED | OUTPATIENT
Start: 2024-06-27 | End: 2024-07-03 | Stop reason: HOSPADM

## 2024-06-27 RX ORDER — QUETIAPINE FUMARATE 50 MG/1
50 TABLET, FILM COATED ORAL NIGHTLY
COMMUNITY

## 2024-06-27 RX ORDER — INSULIN LISPRO 100 [IU]/ML
0-4 INJECTION, SOLUTION INTRAVENOUS; SUBCUTANEOUS NIGHTLY
Status: DISCONTINUED | OUTPATIENT
Start: 2024-06-28 | End: 2024-07-03 | Stop reason: HOSPADM

## 2024-06-27 RX ORDER — ENOXAPARIN SODIUM 100 MG/ML
30 INJECTION SUBCUTANEOUS DAILY
Status: DISCONTINUED | OUTPATIENT
Start: 2024-06-28 | End: 2024-06-28

## 2024-06-27 RX ADMIN — HYDROCODONE BITARTRATE AND ACETAMINOPHEN 1 TABLET: 5; 325 TABLET ORAL at 22:37

## 2024-06-27 RX ADMIN — ONDANSETRON 4 MG: 2 INJECTION INTRAMUSCULAR; INTRAVENOUS at 22:37

## 2024-06-27 ASSESSMENT — LIFESTYLE VARIABLES
HOW OFTEN DO YOU HAVE A DRINK CONTAINING ALCOHOL: NEVER
HOW MANY STANDARD DRINKS CONTAINING ALCOHOL DO YOU HAVE ON A TYPICAL DAY: PATIENT DOES NOT DRINK

## 2024-06-27 ASSESSMENT — PAIN SCALES - GENERAL: PAINLEVEL_OUTOF10: 8

## 2024-06-27 ASSESSMENT — HEART SCORE: ECG: NON-SPECIFC REPOLARIZATION DISTURBANCE/LBTB/PM

## 2024-06-28 LAB
ANION GAP SERPL CALCULATED.3IONS-SCNC: 16 MMOL/L (ref 3–16)
BACTERIA URNS QL MICRO: ABNORMAL /HPF
BILIRUB UR QL STRIP.AUTO: NEGATIVE
BUN SERPL-MCNC: 40 MG/DL (ref 7–20)
CALCIUM SERPL-MCNC: 9.3 MG/DL (ref 8.3–10.6)
CHLORIDE SERPL-SCNC: 107 MMOL/L (ref 99–110)
CHOLEST SERPL-MCNC: 203 MG/DL (ref 0–199)
CLARITY UR: CLEAR
CO2 SERPL-SCNC: 21 MMOL/L (ref 21–32)
COLOR UR: YELLOW
CREAT SERPL-MCNC: 2.2 MG/DL (ref 0.6–1.2)
DEPRECATED RDW RBC AUTO: 16 % (ref 12.4–15.4)
EPI CELLS #/AREA URNS AUTO: 5 /HPF (ref 0–5)
GFR SERPLBLD CREATININE-BSD FMLA CKD-EPI: 24 ML/MIN/{1.73_M2}
GLUCOSE BLD-MCNC: 143 MG/DL (ref 70–99)
GLUCOSE BLD-MCNC: 147 MG/DL (ref 70–99)
GLUCOSE BLD-MCNC: 147 MG/DL (ref 70–99)
GLUCOSE BLD-MCNC: 175 MG/DL (ref 70–99)
GLUCOSE SERPL-MCNC: 123 MG/DL (ref 70–99)
GLUCOSE UR STRIP.AUTO-MCNC: NEGATIVE MG/DL
HCT VFR BLD AUTO: 28.4 % (ref 36–48)
HDLC SERPL-MCNC: 45 MG/DL (ref 40–60)
HGB BLD-MCNC: 9 G/DL (ref 12–16)
HGB UR QL STRIP.AUTO: NEGATIVE
HYALINE CASTS #/AREA URNS AUTO: 7 /LPF (ref 0–8)
KETONES UR STRIP.AUTO-MCNC: NEGATIVE MG/DL
LDLC SERPL CALC-MCNC: 121 MG/DL
LEUKOCYTE ESTERASE UR QL STRIP.AUTO: NEGATIVE
MCH RBC QN AUTO: 29.6 PG (ref 26–34)
MCHC RBC AUTO-ENTMCNC: 31.7 G/DL (ref 31–36)
MCV RBC AUTO: 93.6 FL (ref 80–100)
NITRITE UR QL STRIP.AUTO: NEGATIVE
PERFORMED ON: ABNORMAL
PH UR STRIP.AUTO: 5 [PH] (ref 5–8)
PLATELET # BLD AUTO: 220 K/UL (ref 135–450)
PMV BLD AUTO: 7.7 FL (ref 5–10.5)
POTASSIUM SERPL-SCNC: 5 MMOL/L (ref 3.5–5.1)
PROT UR STRIP.AUTO-MCNC: 100 MG/DL
RBC # BLD AUTO: 3.04 M/UL (ref 4–5.2)
RBC CLUMPS #/AREA URNS AUTO: 0 /HPF (ref 0–4)
SODIUM SERPL-SCNC: 144 MMOL/L (ref 136–145)
SP GR UR STRIP.AUTO: 1.02 (ref 1–1.03)
TRIGL SERPL-MCNC: 186 MG/DL (ref 0–150)
TROPONIN, HIGH SENSITIVITY: 39 NG/L (ref 0–14)
TROPONIN, HIGH SENSITIVITY: 42 NG/L (ref 0–14)
UA COMPLETE W REFLEX CULTURE PNL UR: ABNORMAL
UA DIPSTICK W REFLEX MICRO PNL UR: YES
URN SPEC COLLECT METH UR: ABNORMAL
UROBILINOGEN UR STRIP-ACNC: 1 E.U./DL
VLDLC SERPL CALC-MCNC: 37 MG/DL
WBC # BLD AUTO: 5.2 K/UL (ref 4–11)
WBC #/AREA URNS AUTO: 4 /HPF (ref 0–5)

## 2024-06-28 PROCEDURE — 6370000000 HC RX 637 (ALT 250 FOR IP): Performed by: PHYSICIAN ASSISTANT

## 2024-06-28 PROCEDURE — 6360000002 HC RX W HCPCS: Performed by: PHYSICIAN ASSISTANT

## 2024-06-28 PROCEDURE — G0378 HOSPITAL OBSERVATION PER HR: HCPCS

## 2024-06-28 PROCEDURE — 94761 N-INVAS EAR/PLS OXIMETRY MLT: CPT

## 2024-06-28 PROCEDURE — 36415 COLL VENOUS BLD VENIPUNCTURE: CPT

## 2024-06-28 PROCEDURE — 85027 COMPLETE CBC AUTOMATED: CPT

## 2024-06-28 PROCEDURE — 2580000003 HC RX 258: Performed by: INTERNAL MEDICINE

## 2024-06-28 PROCEDURE — 94640 AIRWAY INHALATION TREATMENT: CPT

## 2024-06-28 PROCEDURE — 96361 HYDRATE IV INFUSION ADD-ON: CPT

## 2024-06-28 PROCEDURE — 6360000002 HC RX W HCPCS: Performed by: STUDENT IN AN ORGANIZED HEALTH CARE EDUCATION/TRAINING PROGRAM

## 2024-06-28 PROCEDURE — 2580000003 HC RX 258: Performed by: PHYSICIAN ASSISTANT

## 2024-06-28 PROCEDURE — 80048 BASIC METABOLIC PNL TOTAL CA: CPT

## 2024-06-28 PROCEDURE — 81001 URINALYSIS AUTO W/SCOPE: CPT

## 2024-06-28 PROCEDURE — 96376 TX/PRO/DX INJ SAME DRUG ADON: CPT

## 2024-06-28 PROCEDURE — 84484 ASSAY OF TROPONIN QUANT: CPT

## 2024-06-28 PROCEDURE — 99222 1ST HOSP IP/OBS MODERATE 55: CPT | Performed by: STUDENT IN AN ORGANIZED HEALTH CARE EDUCATION/TRAINING PROGRAM

## 2024-06-28 PROCEDURE — 96372 THER/PROPH/DIAG INJ SC/IM: CPT

## 2024-06-28 PROCEDURE — 80061 LIPID PANEL: CPT

## 2024-06-28 RX ORDER — FERROUS SULFATE 325(65) MG
325 TABLET ORAL
Status: DISCONTINUED | OUTPATIENT
Start: 2024-06-28 | End: 2024-07-03 | Stop reason: HOSPADM

## 2024-06-28 RX ORDER — LOSARTAN POTASSIUM 25 MG/1
50 TABLET ORAL DAILY
Status: DISCONTINUED | OUTPATIENT
Start: 2024-06-28 | End: 2024-06-28

## 2024-06-28 RX ORDER — DOCUSATE SODIUM 100 MG/1
100 CAPSULE, LIQUID FILLED ORAL 2 TIMES DAILY
Status: DISCONTINUED | OUTPATIENT
Start: 2024-06-28 | End: 2024-06-30

## 2024-06-28 RX ORDER — MIDODRINE HYDROCHLORIDE 5 MG/1
5 TABLET ORAL 3 TIMES DAILY PRN
Status: DISCONTINUED | OUTPATIENT
Start: 2024-06-28 | End: 2024-07-03 | Stop reason: HOSPADM

## 2024-06-28 RX ORDER — SODIUM CHLORIDE 9 MG/ML
INJECTION, SOLUTION INTRAVENOUS CONTINUOUS
Status: DISCONTINUED | OUTPATIENT
Start: 2024-06-28 | End: 2024-06-29

## 2024-06-28 RX ORDER — DULOXETIN HYDROCHLORIDE 60 MG/1
60 CAPSULE, DELAYED RELEASE ORAL DAILY
Status: DISCONTINUED | OUTPATIENT
Start: 2024-06-28 | End: 2024-07-03 | Stop reason: HOSPADM

## 2024-06-28 RX ORDER — FLUTICASONE PROPIONATE 50 MCG
1 SPRAY, SUSPENSION (ML) NASAL DAILY PRN
Status: DISCONTINUED | OUTPATIENT
Start: 2024-06-28 | End: 2024-07-03 | Stop reason: HOSPADM

## 2024-06-28 RX ORDER — CYCLOBENZAPRINE HCL 10 MG
5 TABLET ORAL 3 TIMES DAILY PRN
Status: DISCONTINUED | OUTPATIENT
Start: 2024-06-28 | End: 2024-06-30

## 2024-06-28 RX ORDER — PANTOPRAZOLE SODIUM 40 MG/1
40 TABLET, DELAYED RELEASE ORAL
Status: DISCONTINUED | OUTPATIENT
Start: 2024-06-28 | End: 2024-07-01

## 2024-06-28 RX ORDER — HEPARIN SODIUM 5000 [USP'U]/ML
5000 INJECTION, SOLUTION INTRAVENOUS; SUBCUTANEOUS EVERY 8 HOURS SCHEDULED
Status: DISCONTINUED | OUTPATIENT
Start: 2024-06-28 | End: 2024-07-03 | Stop reason: HOSPADM

## 2024-06-28 RX ORDER — PREDNISONE 10 MG/1
10 TABLET ORAL DAILY
Status: DISCONTINUED | OUTPATIENT
Start: 2024-06-28 | End: 2024-07-03 | Stop reason: HOSPADM

## 2024-06-28 RX ADMIN — SODIUM CHLORIDE, PRESERVATIVE FREE 10 ML: 5 INJECTION INTRAVENOUS at 08:42

## 2024-06-28 RX ADMIN — HYDRALAZINE HYDROCHLORIDE 25 MG: 25 TABLET ORAL at 00:14

## 2024-06-28 RX ADMIN — HEPARIN SODIUM 5000 UNITS: 5000 INJECTION INTRAVENOUS; SUBCUTANEOUS at 20:47

## 2024-06-28 RX ADMIN — INSULIN GLARGINE 8 UNITS: 100 INJECTION, SOLUTION SUBCUTANEOUS at 00:36

## 2024-06-28 RX ADMIN — TICAGRELOR 90 MG: 90 TABLET ORAL at 08:40

## 2024-06-28 RX ADMIN — AMITRIPTYLINE HYDROCHLORIDE 40 MG: 10 TABLET, FILM COATED ORAL at 00:13

## 2024-06-28 RX ADMIN — INSULIN GLARGINE 8 UNITS: 100 INJECTION, SOLUTION SUBCUTANEOUS at 20:48

## 2024-06-28 RX ADMIN — LOSARTAN POTASSIUM 50 MG: 25 TABLET, FILM COATED ORAL at 08:39

## 2024-06-28 RX ADMIN — RANOLAZINE 1000 MG: 500 TABLET, EXTENDED RELEASE ORAL at 08:40

## 2024-06-28 RX ADMIN — ATORVASTATIN CALCIUM 80 MG: 80 TABLET, FILM COATED ORAL at 20:43

## 2024-06-28 RX ADMIN — SODIUM CHLORIDE, PRESERVATIVE FREE 10 ML: 5 INJECTION INTRAVENOUS at 20:48

## 2024-06-28 RX ADMIN — TRAMADOL HYDROCHLORIDE 50 MG: 50 TABLET ORAL at 09:19

## 2024-06-28 RX ADMIN — HEPARIN SODIUM 5000 UNITS: 5000 INJECTION INTRAVENOUS; SUBCUTANEOUS at 14:40

## 2024-06-28 RX ADMIN — TICAGRELOR 90 MG: 90 TABLET ORAL at 20:44

## 2024-06-28 RX ADMIN — HYDRALAZINE HYDROCHLORIDE 25 MG: 25 TABLET ORAL at 08:40

## 2024-06-28 RX ADMIN — DOCUSATE SODIUM 100 MG: 100 CAPSULE, LIQUID FILLED ORAL at 08:39

## 2024-06-28 RX ADMIN — ASPIRIN 81 MG 81 MG: 81 TABLET ORAL at 08:40

## 2024-06-28 RX ADMIN — HYDRALAZINE HYDROCHLORIDE 25 MG: 25 TABLET ORAL at 20:44

## 2024-06-28 RX ADMIN — ATORVASTATIN CALCIUM 80 MG: 80 TABLET, FILM COATED ORAL at 00:14

## 2024-06-28 RX ADMIN — LEVETIRACETAM 500 MG: 500 TABLET, FILM COATED ORAL at 00:14

## 2024-06-28 RX ADMIN — ISOSORBIDE MONONITRATE 60 MG: 60 TABLET, EXTENDED RELEASE ORAL at 20:47

## 2024-06-28 RX ADMIN — PREDNISONE 10 MG: 10 TABLET ORAL at 08:39

## 2024-06-28 RX ADMIN — DULOXETINE HYDROCHLORIDE 60 MG: 60 CAPSULE, DELAYED RELEASE ORAL at 08:39

## 2024-06-28 RX ADMIN — INSULIN GLARGINE 8 UNITS: 100 INJECTION, SOLUTION SUBCUTANEOUS at 08:48

## 2024-06-28 RX ADMIN — MONTELUKAST SODIUM 10 MG: 10 TABLET, FILM COATED ORAL at 00:13

## 2024-06-28 RX ADMIN — AMITRIPTYLINE HYDROCHLORIDE 40 MG: 10 TABLET, FILM COATED ORAL at 20:46

## 2024-06-28 RX ADMIN — INSULIN LISPRO 4 UNITS: 100 INJECTION, SOLUTION INTRAVENOUS; SUBCUTANEOUS at 17:48

## 2024-06-28 RX ADMIN — RANOLAZINE 1000 MG: 500 TABLET, EXTENDED RELEASE ORAL at 00:14

## 2024-06-28 RX ADMIN — HYDRALAZINE HYDROCHLORIDE 25 MG: 25 TABLET ORAL at 14:40

## 2024-06-28 RX ADMIN — QUETIAPINE FUMARATE 50 MG: 25 TABLET ORAL at 00:13

## 2024-06-28 RX ADMIN — QUETIAPINE FUMARATE 50 MG: 25 TABLET ORAL at 20:44

## 2024-06-28 RX ADMIN — LEVETIRACETAM 500 MG: 500 TABLET, FILM COATED ORAL at 20:44

## 2024-06-28 RX ADMIN — ONDANSETRON 4 MG: 2 INJECTION INTRAMUSCULAR; INTRAVENOUS at 14:39

## 2024-06-28 RX ADMIN — ISOSORBIDE MONONITRATE 60 MG: 60 TABLET, EXTENDED RELEASE ORAL at 08:39

## 2024-06-28 RX ADMIN — RANOLAZINE 1000 MG: 500 TABLET, EXTENDED RELEASE ORAL at 20:47

## 2024-06-28 RX ADMIN — FERROUS SULFATE TAB 325 MG (65 MG ELEMENTAL FE) 325 MG: 325 (65 FE) TAB at 08:39

## 2024-06-28 RX ADMIN — MONTELUKAST SODIUM 10 MG: 10 TABLET, FILM COATED ORAL at 20:47

## 2024-06-28 RX ADMIN — LEVETIRACETAM 500 MG: 500 TABLET, FILM COATED ORAL at 08:39

## 2024-06-28 RX ADMIN — SODIUM CHLORIDE: 9 INJECTION, SOLUTION INTRAVENOUS at 17:54

## 2024-06-28 RX ADMIN — TICAGRELOR 90 MG: 90 TABLET ORAL at 00:13

## 2024-06-28 RX ADMIN — INSULIN LISPRO 4 UNITS: 100 INJECTION, SOLUTION INTRAVENOUS; SUBCUTANEOUS at 00:14

## 2024-06-28 RX ADMIN — ISOSORBIDE MONONITRATE 60 MG: 60 TABLET, EXTENDED RELEASE ORAL at 00:14

## 2024-06-28 ASSESSMENT — PAIN DESCRIPTION - DESCRIPTORS: DESCRIPTORS: HEAVINESS

## 2024-06-28 ASSESSMENT — PAIN SCALES - GENERAL
PAINLEVEL_OUTOF10: 0
PAINLEVEL_OUTOF10: 5
PAINLEVEL_OUTOF10: 0
PAINLEVEL_OUTOF10: 3
PAINLEVEL_OUTOF10: 7
PAINLEVEL_OUTOF10: 3
PAINLEVEL_OUTOF10: 7

## 2024-06-28 ASSESSMENT — PAIN DESCRIPTION - LOCATION
LOCATION: CHEST

## 2024-06-28 ASSESSMENT — PAIN DESCRIPTION - ORIENTATION: ORIENTATION: LEFT

## 2024-06-28 NOTE — RT PROTOCOL NOTE
RT Inhaler-Nebulizer Bronchodilator Protocol Note    There is a bronchodilator order in the chart from a provider indicating to follow the RT Bronchodilator Protocol and there is an “Initiate RT Inhaler-Nebulizer Bronchodilator Protocol” order as well (see protocol at bottom of note).    CXR Findings:  XR CHEST PORTABLE    Result Date: 6/27/2024  No radiographic evidence of acute cardiopulmonary pathology.       The findings from the last RT Protocol Assessment were as follows:   History Pulmonary Disease: Chronic pulmonary disease  Respiratory Pattern: Regular pattern and RR 12-20 bpm  Breath Sounds: Clear breath sounds  Cough: Strong, spontaneous, non-productive  Indication for Bronchodilator Therapy: On home bronchodilators  Bronchodilator Assessment Score: 2    Aerosolized bronchodilator medication orders have been revised according to the RT Inhaler-Nebulizer Bronchodilator Protocol below.    Respiratory Therapist to perform RT Therapy Protocol Assessment initially then follow the protocol.  Repeat RT Therapy Protocol Assessment PRN for score 0-3 or on second treatment, BID, and PRN for scores above 3.    No Indications - adjust the frequency to every 6 hours PRN wheezing or bronchospasm, if no treatments needed after 48 hours then discontinue using Per Protocol order mode.     If indication present, adjust the RT bronchodilator orders based on the Bronchodilator Assessment Score as indicated below.  Use Inhaler orders unless patient has one or more of the following: on home nebulizer, not able to hold breath for 10 seconds, is not alert and oriented, cannot activate and use MDI correctly, or respiratory rate 25 breaths per minute or more, then use the equivalent nebulizer order(s) with same Frequency and PRN reasons based on the score.  If a patient is on this medication at home then do not decrease Frequency below that used at home.    0-3 - enter or revise RT bronchodilator order(s) to equivalent RT

## 2024-06-28 NOTE — CARE COORDINATION
SW received a call from Charissa at [facility] stating that patient is active with them for RN and PT services.  Stated if patient stays in observation status, they do not need orders.  If patient switches to inpatient status, then new [facility] orders will need to be placed.    Electronically signed by CUATE Miller, MARISSA on 6/28/2024 at 1:26 PM

## 2024-06-28 NOTE — ED PROVIDER NOTES
[facility] EMERGENCY DEPARTMENT  EMERGENCY DEPARTMENT ENCOUNTER        Pt Name: Maren Meza  MRN: [id number]  Birthdate 1955  Date of evaluation: 6/27/2024  Provider: Jose Alejandro Casas MD  PCP: Marin Cardenas MD  Note Started: 8:58 PM EDT 6/27/24    CHIEF COMPLAINT       Chief Complaint   Patient presents with    Chest Pain     Pt from home via [facility] c/o chest pain for a few hours, hx of stents, states she took 3 nitro and 81 mg ASA at home with no relief, EMS gave 3 more baby aspirin en route       HISTORY OF PRESENT ILLNESS: 1 or more Elements     History from : Patient    Limitations to history : None    Maren Meza is a 68 y.o. female who presents for chest pain which been going on for the last 2 hours.  Patient has history of previous cardiac stents.  History of COPD on oxygen chronically history of CHF.  Patient does have prior MI.  Patient has history of CKD as well.  Patient states her symptoms rapidly got worse.  Aching central crushing chest pain occasionally sharp when she takes deep breath.  EMS gave aspirin and route.  Patient took 3 nitro at home with some relief but minimal.  EMS was concern for possible cardiac ischemia.  Patient denies any shortness of breath.  Denies any fevers chills nausea or vomiting no abdominal pain no other modifying factors.  Denies any leg pain or swelling    Nursing Notes were all reviewed and agreed with or any disagreements were addressed in the HPI.    REVIEW OF SYSTEMS :      Review of Systems   Constitutional: Negative.    HENT:  Negative for congestion and sore throat.    Eyes: Negative.    Respiratory: Negative.  Negative for shortness of breath.    Cardiovascular:  Positive for chest pain.   Gastrointestinal: Negative.  Negative for abdominal pain.   Genitourinary: Negative.    Musculoskeletal: Negative.    Skin: Negative.    Neurological: Negative.  Negative for headaches.       Positives and Pertinent negatives as per  2258       Jose Alejandro Casas MD  06/27/24 5347

## 2024-06-28 NOTE — H&P
[facility] Medicine History & Physical        Name:  Maren Meza /Age/Sex: 1955  (68 y.o. female)   MRN & CSN:  [id number] & [id number] Encounter Date/Time: 2024 11:22 PM EDT   Location:  ED- PCP: Marin Cardenas MD         CHIEF COMPLAINT:   Chief Complaint   Patient presents with    Chest Pain     Pt from home via [facility] c/o chest pain for a few hours, hx of stents, states she took 3 nitro and 81 mg ASA at home with no relief, EMS gave 3 more baby aspirin en route         HISTORY OF PRESENT ILLNESS:      History from: patient  Limitations to history : None     Maren Meza is a 68 y.o. female who presented to ED for evaluation of chest pain which began 2 hours prior to arrival.  Patient has a history of previous cardiac stents.  Patient reports aching, crushing, central chest pain began acutely this evening.  She also reports occasional sharp pain with deep breath.  She reports that she took 3 nitro at home with only minimal relief.  EMS gave aspirin and route.  Patient denies fever, shortness of breath, cough, edema, abdominal pain, nausea, vomiting, diarrhea, urinary symptoms.  She denies any other complaints or concerns at this time.      REVIEW OF SYSTEMS:   Pertinent positives as noted in the HPI. All other systems reviewed and negative.      PHYSICAL EXAM PERFORMED:  BP (!) 147/75   Pulse 80   Temp 98.4 °F (36.9 °C) (Oral)   Resp 14   Ht 1.524 m (5')   Wt 49.9 kg (110 lb)   SpO2 99%   BMI 21.48 kg/m²     General appearance:  Awake, alert, no apparent distress  HEENT:  Normocephalic, atraumatic without obvious deformity. PERRL. EOM intact. Conjunctivae/corneas clear.  Neck:  Supple, with full range of motion. No JVD. Trachea midline.  Respiratory:  Clear to auscultation bilaterally without rales, wheezes, or rhonchi. Normal respiratory effort.   Cardiovascular:  Regular rate and rhythm without murmurs, rubs or gallops.   Abdomen:  Soft, NT, ND, without  \"TROPONINT\"  Lactic Acid: No results for input(s): \"LACTA\" in the last 72 hours.  BNP:   Recent Labs     06/27/24  2040   PROBNP 1,705*     UA:  Lab Results   Component Value Date/Time    NITRU Negative 06/04/2024 01:03 AM    COLORU Yellow 06/04/2024 01:03 AM    PHUR 6.0 06/04/2024 01:03 AM    PHUR 5.5 04/05/2024 11:35 AM    LABCAST 20-40 Hyaline 07/06/2017 10:42 PM    WBCUA 10-20 06/04/2024 01:03 AM    RBCUA 3-4 06/04/2024 01:03 AM    MUCUS 1+ 05/23/2013 01:27 PM    YEAST Rare Yeast 05/14/2012 03:29 PM    BACTERIA 2+ 06/04/2024 01:03 AM    CLARITYU Clear 06/04/2024 01:03 AM    LEUKOCYTESUR TRACE 06/04/2024 01:03 AM    UROBILINOGEN 0.2 06/04/2024 01:03 AM    BILIRUBINUR Negative 06/04/2024 01:03 AM    BLOODU TRACE-INTACT 06/04/2024 01:03 AM    GLUCOSEU Negative 06/04/2024 01:03 AM    GLUCOSEU NEGATIVE 05/14/2012 03:29 PM    KETUA Negative 06/04/2024 01:03 AM    AMORPHOUS Rare 12/11/2014 11:11 AM     Urine Cultures:   Lab Results   Component Value Date/Time    LABURIN  06/04/2024 01:12 AM     50,000 CFU/ml  Susceptibility testing not routinely done. No further work up.       Blood Cultures:   Lab Results   Component Value Date/Time    BC No growth after 5 days of incubation. 07/28/2019 08:38 AM     Lab Results   Component Value Date/Time    BLOODCULT2 No growth after 5 days of incubation. 07/28/2019 08:38 AM     Organism:   Lab Results   Component Value Date/Time    ORG Aerococcus species 06/04/2024 01:12 AM       IMAGING/DIAGNOSTICS LAST 24 HOURS    XR CHEST PORTABLE    Result Date: 6/27/2024  EXAMINATION: ONE XRAY VIEW OF THE CHEST 6/27/2024 9:04 pm COMPARISON: CXR dated 06/04/2024 HISTORY: ORDERING SYSTEM PROVIDED HISTORY: Chest pain TECHNOLOGIST PROVIDED HISTORY: Reason for exam:->Chest pain Reason for Exam: chest pain FINDINGS: Mediastinum/Heart: The mediastinal contours are unchanged compared to prior exam. Lungs: The lungs are clear. Pleura: No pleural effusion. No pneumothorax.     No radiographic evidence of

## 2024-06-28 NOTE — CONSULTS
04:30 AM     CMP:    Lab Results   Component Value Date/Time     06/28/2024 04:30 AM    K 5.0 06/28/2024 04:30 AM     06/28/2024 04:30 AM    CO2 21 06/28/2024 04:30 AM    BUN 40 06/28/2024 04:30 AM    CREATININE 2.2 06/28/2024 04:30 AM    GFRAA 46 10/09/2022 06:18 AM    GFRAA 60 05/23/2013 02:56 PM    AGRATIO 0.9 06/27/2024 08:40 PM    LABGLOM 24 06/28/2024 04:30 AM    LABGLOM 49 04/22/2024 04:43 AM    GLUCOSE 123 06/28/2024 04:30 AM    PROT 8.1 02/15/2013 04:53 PM    CALCIUM 9.3 06/28/2024 04:30 AM    BILITOT <0.2 06/27/2024 08:40 PM    ALKPHOS 145 06/27/2024 08:40 PM    AST 19 06/27/2024 08:40 PM    ALT 11 06/27/2024 08:40 PM     PT/INR:  No results found for: \"PTINR\"  Lab Results   Component Value Date    CKTOTAL 41 08/22/2019    CKMB 2.3 10/06/2013    CKMBINDEX 0.9 04/01/2010    TROPONINI <0.01 04/07/2023       EKG:  I have reviewed EKG with the following interpretation:  Impression:      Sinus tachycardia       Echo:   Summary Limited study. 4/4/24  There is asymmetric hypertrophy of the basal septum. Overall left ventricular systolic function appears low normal with an ejection fraction of 50%. mid to distal septal walls are hypokinetic The mitral valve leaflets are normal in structure. Mitral annular calcification is present. Mild mitral regurgitation is present. The right ventricle is normal in size and function. TAPSE measures 1.64 cm.     Cath:   ANGIOGRAM/CORONARY ARTERIOGRAM:   3/27/24     The left main coronary artery is very short without significant disease.     The left anterior descending artery has patent previously placed stent with mild mid vessel 20% ISR.     The left circumflex artery is non-dominant with small AV groove course with minor luminal irregularities.              OM1 has moderate ISR 40% mid vessel              OM2 has mild ISR 30% proximal and mid vessel     The right coronary artery is dominant vessel with widely patent stents.              RPDA has mild diffuse  disease     Selective angiography of the L common femoral artery was performed without evidence of extravasation         Total visit time > 75 minutes; > 50% spend counseling / coordinating care. I reviewed interval history, physical exam, review of data including labs, imaging, development and implementation of treatment plan and coordination of complex care. Counseled on risk factor modifications.     Assessment/Plan:  Troponin elevation - low level consistent with her previous elevations  No current chest pain  Recent ischemic evaluation (angiography) without obstructive disease  - No plan for further ischemic evaluation  - Follow up with Dr. Allred      I will address the patient's cardiac risk factors and adjusted pharmacologic treatment as needed. In addition, I have reinforced the need for patient directed risk factor modification.    Tobacco use was discussed with the patient and educated on the negative effects. I have asked the patient to not utilize these agents.    Thank you for allowing to us to participate in the care or Maren Meza. Further evaluation will be based upon the patient's clinical course and testing results.    All questions and concerns were addressed to the patient/family. Alternatives to my treatment were discussed. The note was completed using EMR. Every effort was made to ensure accuracy; however, inadvertent computerized transcription errors may be present.    Malcolm Baer MD  Interventional Cardiology  6/28/2024  7:50 AM    [facility]  [address], [address]   [place] [address]  Ph: [phone]  Fax: [phone]    I, Abebe Nichole RN, am scribing for and in the presence of Malcolm Baer  6/28/24/7:50 AM EDT       NOTE: This report was transcribed using voice recognition software. Every effort was made to ensure accuracy, however, inadvertent computerized transcription errors may be present.

## 2024-06-28 NOTE — ED NOTES
How does patient ambulate?   []Low Fall Risk (ambulates by themselves without support)  [x]Stand by assist   []Contact Guard   []Front wheel walker  []Wheelchair   []Steady  []Bed bound  []History of Lower Extremity Amputation  []Unknown, did not assess in the emergency department   How does patient take pills?  [x]Whole with Water  []Crushed in applesauce  []Crushed in pudding  []Other  []Unknown no oral medications were given in the ED  Is patient alert?   [x]Alert  []Drowsy but responds to voice  []Doesn't respond to voice but responds to painful stimuli  []Unresponsive  Is patient oriented?   [x]To person  [x]To place  [x]To time  [x]To situation  []Confused  []Agitated  [x]Follows commands  If patient is disoriented or from a Skill Nursing Facility has family been notified of admission?   []Yes   []No  Patient belongings?   []Cell phone  []Wallet   []Dentures  [x]Clothing  Any specific patient or family belongings/needs/dynamics?   none  Miscellaneous comments/pending orders?  See admit order      If there are any additional questions please reach out to the Emergency Department.

## 2024-06-28 NOTE — PROGRESS NOTES
release tablet Take 1 tablet by mouth in the morning and at bedtime      Insulin Aspart (NOVOLOG FLEXPEN SC) Inject 4 Units into the skin 3 times daily (with meals)      ticagrelor (BRILINTA) 90 MG TABS tablet Take 1 tablet by mouth 2 times daily      Calcium Carb-Cholecalciferol (OYSTER SHELL CALCIUM W/D) 500-5 MG-MCG TABS tablet Take 1 tablet by mouth daily      cyclobenzaprine (FLEXERIL) 5 MG tablet Take 1 tablet by mouth 3 times daily as needed      NURTEC 75 MG TBDP Take 1 tablet by mouth once as needed (Migraine.)      traMADol (ULTRAM) 50 MG tablet Take 1 tablet by mouth every 6 hours as needed for Pain.      aspirin (ASPIRIN CHILDRENS) 81 MG chewable tablet Take 1 tablet by mouth daily 30 tablet 3    ranolazine (RANEXA) 1000 MG extended release tablet TAKE 1 TABLET BY MOUTH 2 TIMES DAILY 180 tablet 1    QUEtiapine (SEROQUEL) 25 MG tablet TAKE 1-2 TABLETS BY MOUTH NIGHTLY (Patient not taking: Reported on 6/27/2024) 60 tablet 3    fluticasone (FLONASE) 50 MCG/ACT nasal spray 1 spray by Each Nostril route daily 16 g 0    ondansetron (ZOFRAN-ODT) 4 MG disintegrating tablet Take 1 tablet by mouth 3 times daily as needed for Nausea or Vomiting 30 tablet 1    albuterol (PROVENTIL) (2.5 MG/3ML) 0.083% nebulizer solution TAKE 3 MLS BY NEBULIZATION EVERY 4 HOURS AS NEEDED FOR WHEEZING (Patient taking differently: Take 3 mLs by nebulization every 4 hours as needed for Wheezing) 360 mL 5    albuterol sulfate HFA (PROVENTIL;VENTOLIN;PROAIR) 108 (90 Base) MCG/ACT inhaler Inhale 2 puffs into the lungs every 4 hours as needed for Wheezing or Shortness of Breath      linaclotide (LINZESS) 290 MCG CAPS capsule Take 1 capsule by mouth every morning (before breakfast)      hydrOXYzine HCl (ATARAX) 25 MG tablet Take 1 tablet by mouth 3 times daily as needed for Itching      ferrous sulfate (IRON 325) 325 (65 Fe) MG tablet Take 1 tablet by mouth daily (with breakfast)      amitriptyline (ELAVIL) 10 MG tablet Take 4 tablets by  mouth nightly      nitroGLYCERIN (NITROSTAT) 0.4 MG SL tablet Place 1 tablet under the tongue every 5 minutes as needed for Chest pain up to max of 3 total doses. If no relief after 1 dose, call 911. 25 tablet 3    diclofenac sodium (VOLTAREN) 1 % GEL Apply 4 g topically 4 times daily (Patient taking differently: Apply 4 g topically 4 times daily as needed) 250 g 4    fexofenadine (ALLEGRA) 180 MG tablet Take 1 tablet by mouth daily as needed (Seasonal allergies.)      montelukast (SINGULAIR) 10 MG tablet TAKE 1 TABLET BY MOUTH DAILY (Patient taking differently: Take 1 tablet by mouth nightly) 30 tablet 1    fluticasone-salmeterol (ADVAIR) 500-50 MCG/ACT AEPB diskus inhaler Inhale 1 puff into the lungs in the morning and 1 puff in the evening. 60 each 3    clotrimazole-betamethasone (LOTRISONE) 1-0.05 % cream Apply topically 2 times daily. (Patient taking differently: 2 times daily as needed Apply topically 2 times daily.) 5 g 5    docusate sodium (COLACE) 100 MG capsule Take 1 capsule by mouth 2 times daily         Patient is no longer taking acidophilus. Nifedipine and Toprol are both on hold currently: patient states cardio and PCP are trying to figure out her SBP/heart rate issues.    Of note, patient took all AM and midday meds prior to ED arrival.    Timing of last doses updated.    Thank you,  Jocelynn Darnell, GATOhT

## 2024-06-28 NOTE — PROGRESS NOTES
[facility]    Hospitalist Progress Note:      Name:  Maren Meza /Age/Sex: 1955  (68 y.o. female)   MRN & CSN:  [id number] & [id number] Encounter Date: 2024    Location:  [id number] PCP: Marin Cardenas MD     Attending:Venu Andrade MD  Admission Date: 2024      Hospital Day: 2    Assessment:  Maren Meza is a 68 y.o. female with medical history including CAD, PCI, HTN, ischemic cardiomyopathy, DM 2, hyperlipidemia and brain tumor presented to ED for evaluation of chest pain.  Chest pain, intermittent, unclear etio: r/o ACS  KOLBY over CKD 3  CAD: Hx. MVPCI, mild nonobstructive ISR by 3/2024 [facility]  Ischemic cardiomyopathy, LVEF 50%  Hypertension  CKD  Hyperlipidemia   Diabetes mellitus  Brain tumor - on prednisone 10 mg daily    Plan:   Tele, trend troponin, Follow-up cardiology team  Cont IVFs, Avoid nephrotoxic meds. Monitor urine output. Monitor renal functions and lytes. Noted nephrology team input.  Current meds reviewed, adjusted.   See orders  Diet ADULT DIET; Regular; 4 carb choices (60 gm/meal)   DVT Prophylaxis [x] Lovenox, []  Heparin, [] SCDs, [] Ambulation,  [] Eliquis, [] Xarelto  [] Coumadin   Code Status Full Code   Disposition Expected Disposition: Home vs ECF vs Hm with [facility]  Estimated Date of Discharge:  1-2 days  Reason for continued admission: KOLBY     Subjective:  Pt. seen and examined at bedside.  Overall symptoms are resolving.  States still intermittent chest pain/pressure in middle of chest.  Denies any Cough/SOB/Abd pain/ N/V/Diarrhea. No HA/Dizziness or any tingling/numbness.     Objective:  Vital Signs:  /69   Pulse 90   Temp 97.1 °F (36.2 °C)   Resp 16   Ht 1.524 m (5')   Wt 50.9 kg (112 lb 4.8 oz)   SpO2 98%   BMI 21.93 kg/m²     Diet: ADULT DIET; Regular; 4 carb choices (60 gm/meal)  No intake or output data in the 24 hours ending 24 1606  No intake/output data recorded.  Wt Readings from Last 3 Encounters:  ONE XRAY VIEW OF THE CHEST 6/27/2024 9:04 pm COMPARISON: CXR dated 06/04/2024 HISTORY: ORDERING SYSTEM PROVIDED HISTORY: Chest pain TECHNOLOGIST PROVIDED HISTORY: Reason for exam:->Chest pain Reason for Exam: chest pain FINDINGS: Mediastinum/Heart: The mediastinal contours are unchanged compared to prior exam. Lungs: The lungs are clear. Pleura: No pleural effusion. No pneumothorax.     No radiographic evidence of acute cardiopulmonary pathology.       Cultures:  Organism:   Lab Results   Component Value Date/Time    ORG Aerococcus species 06/04/2024 01:12 AM       Electronically signed by: Electronically signed by Venu Andrade MD on 6/28/2024 at 4:06 PM, 6/28/2024 4:06 PM

## 2024-06-28 NOTE — CONSULTS
and vomiting.   Genitourinary:  Negative for difficulty urinating, dysuria, hematuria and urgency.   Musculoskeletal:  Negative for arthralgias, back pain and myalgias.   Skin:  Negative for pallor and rash.   Neurological:  Negative for dizziness, tremors and headaches.   Psychiatric/Behavioral:  Negative for confusion and hallucinations.        PHYSICAL EXAM:      Vitals:    BP (!) 114/59   Pulse 91   Temp 97.1 °F (36.2 °C)   Resp 16   Ht 1.524 m (5')   Wt 50.9 kg (112 lb 4.8 oz)   SpO2 98%   BMI 21.93 kg/m²   No intake/output data recorded.  No intake/output data recorded.    Physical Exam:  General : AAOx3, not in pain or respiratory distress, resting in bed  HEENT : normocephalic, atraumatic, mucosa moist, no palor or icterus  CVS: S1 S2 normal, regular rhythm, no murmurs or rubs.  Lungs: decreased breath sounds at the bases  Abd: Soft, bowel sounds normal, non-tender.  Ext: No edema  Skin: Warm.  No rashes appreciated.  : bladder non-distended, no tenderness over the bladder  Neuro: Alert and oriented x 3, nonfocal.  Joints: No erythema noted over joints.    DATA:    CBC with Differential:    Lab Results   Component Value Date/Time    WBC 5.2 06/28/2024 04:30 AM    RBC 3.04 06/28/2024 04:30 AM    HGB 9.0 06/28/2024 04:30 AM    HCT 28.4 06/28/2024 04:30 AM     06/28/2024 04:30 AM    MCV 93.6 06/28/2024 04:30 AM    MCH 29.6 06/28/2024 04:30 AM    MCHC 31.7 06/28/2024 04:30 AM    RDW 16.0 06/28/2024 04:30 AM    BANDSPCT 1.0 07/15/2011 12:29 PM    LYMPHOPCT 21.0 06/27/2024 08:40 PM    MONOPCT 3.9 06/27/2024 08:40 PM    EOSPCT 2.8 06/27/2024 08:40 PM    BASOPCT 0.4 06/27/2024 08:40 PM    MONOSABS 0.2 06/27/2024 08:40 PM    EOSABS 0.1 06/27/2024 08:40 PM    BASOSABS 0.0 06/27/2024 08:40 PM    DIFFTYPE Auto 05/23/2013 02:56 PM     BMP:    Lab Results   Component Value Date/Time     06/28/2024 04:30 AM    K 5.0 06/28/2024 04:30 AM     06/28/2024 04:30 AM    CO2 21 06/28/2024 04:30 AM    BUN  40 06/28/2024 04:30 AM    CREATININE 2.2 06/28/2024 04:30 AM    CALCIUM 9.3 06/28/2024 04:30 AM    GFRAA 46 10/09/2022 06:18 AM    GFRAA 60 05/23/2013 02:56 PM    LABGLOM 24 06/28/2024 04:30 AM    LABGLOM 49 04/22/2024 04:43 AM    GLUCOSE 123 06/28/2024 04:30 AM     Ionized Calcium:  No components found for: \"IONCA\"  Magnesium:    Lab Results   Component Value Date/Time    MG 1.90 06/27/2024 08:40 PM     Phosphorus:    Lab Results   Component Value Date/Time    PHOS 2.6 04/05/2024 07:13 AM       ASSESSMENT/PLAN:      Acute kidney injury  Suspect prerenal  Check UA  Continue to hold finerenone  Will hold losartan for now    History of hypertension  Blood pressures have been low normal  Hold losartan    Chest pain  History of CAD/PCI  Management per cardiology    CKD stage IIIb  Risk factors include DM2, hypertension and age    Thank you for allowing me to participate in the care of this patient.  I will continue to follow along.  Please call with questions.    Nydia Gomes MD  Office Phone : [phone]  6/28/2024

## 2024-06-28 NOTE — CARE COORDINATION
06/28/24 1330   Readmission Assessment   Number of Days since last admission? 8-30 days   Previous Disposition Home with Home Health  (Active w/[facility], [facility])   Who is being Interviewed Patient   What was the patient's/caregiver's perception as to why they think they needed to return back to the hospital? Other (Comment)  (continued chest pain)   Did you visit your Primary Care Physician after you left the hospital, before you returned this time? Yes  (follow up phone call from PCP)   Did you see a specialist, such as Cardiac, Pulmonary, Orthopedic Physician, etc. after you left the hospital? Yes   Who advised the patient to return to the hospital? Self-referral   Does the patient report anything that got in the way of taking their medications? No   In our efforts to provide the best possible care to you and others like you, can you think of anything that we could have done to help you after you left the hospital the first time, so that you might not have needed to return so soon? Other (Comment)  (No case management needs identified that could have prevented readmit.)     Electronically signed by CUATE Miller, MARISSA on 6/28/2024 at 1:34 PM

## 2024-06-28 NOTE — PROGRESS NOTES
06/27/24 4361   RT Protocol   History Pulmonary Disease 2   Respiratory pattern 0   Breath sounds 0   Cough 0   Indications for Bronchodilator Therapy On home bronchodilators   Bronchodilator Assessment Score 2

## 2024-06-29 ENCOUNTER — APPOINTMENT (OUTPATIENT)
Dept: CT IMAGING | Age: 69
DRG: 378 | End: 2024-06-29
Payer: COMMERCIAL

## 2024-06-29 LAB
ANION GAP SERPL CALCULATED.3IONS-SCNC: 13 MMOL/L (ref 3–16)
BASOPHILS # BLD: 0 K/UL (ref 0–0.2)
BASOPHILS NFR BLD: 0.5 %
BUN SERPL-MCNC: 40 MG/DL (ref 7–20)
CALCIUM SERPL-MCNC: 8.6 MG/DL (ref 8.3–10.6)
CHLORIDE SERPL-SCNC: 107 MMOL/L (ref 99–110)
CO2 SERPL-SCNC: 19 MMOL/L (ref 21–32)
CREAT SERPL-MCNC: 2 MG/DL (ref 0.6–1.2)
DEPRECATED RDW RBC AUTO: 16 % (ref 12.4–15.4)
EKG ATRIAL RATE: 106 BPM
EKG DIAGNOSIS: NORMAL
EKG P AXIS: 51 DEGREES
EKG P-R INTERVAL: 98 MS
EKG Q-T INTERVAL: 356 MS
EKG QRS DURATION: 78 MS
EKG QTC CALCULATION (BAZETT): 472 MS
EKG R AXIS: 47 DEGREES
EKG T AXIS: 118 DEGREES
EKG VENTRICULAR RATE: 106 BPM
EOSINOPHIL # BLD: 0 K/UL (ref 0–0.6)
EOSINOPHIL NFR BLD: 0.2 %
GFR SERPLBLD CREATININE-BSD FMLA CKD-EPI: 27 ML/MIN/{1.73_M2}
GLUCOSE BLD-MCNC: 104 MG/DL (ref 70–99)
GLUCOSE BLD-MCNC: 121 MG/DL (ref 70–99)
GLUCOSE BLD-MCNC: 136 MG/DL (ref 70–99)
GLUCOSE BLD-MCNC: 152 MG/DL (ref 70–99)
GLUCOSE BLD-MCNC: 70 MG/DL (ref 70–99)
GLUCOSE BLD-MCNC: 75 MG/DL (ref 70–99)
GLUCOSE SERPL-MCNC: 68 MG/DL (ref 70–99)
HCT VFR BLD AUTO: 27.6 % (ref 36–48)
HGB BLD-MCNC: 9.1 G/DL (ref 12–16)
LYMPHOCYTES # BLD: 0.9 K/UL (ref 1–5.1)
LYMPHOCYTES NFR BLD: 15.7 %
MCH RBC QN AUTO: 30.1 PG (ref 26–34)
MCHC RBC AUTO-ENTMCNC: 33 G/DL (ref 31–36)
MCV RBC AUTO: 91.2 FL (ref 80–100)
MONOCYTES # BLD: 0.2 K/UL (ref 0–1.3)
MONOCYTES NFR BLD: 3.8 %
NEUTROPHILS # BLD: 4.8 K/UL (ref 1.7–7.7)
NEUTROPHILS NFR BLD: 79.8 %
PERFORMED ON: ABNORMAL
PERFORMED ON: NORMAL
PERFORMED ON: NORMAL
PLATELET # BLD AUTO: 221 K/UL (ref 135–450)
PMV BLD AUTO: 7.7 FL (ref 5–10.5)
POTASSIUM SERPL-SCNC: 3.7 MMOL/L (ref 3.5–5.1)
RBC # BLD AUTO: 3.03 M/UL (ref 4–5.2)
SODIUM SERPL-SCNC: 139 MMOL/L (ref 136–145)
WBC # BLD AUTO: 6 K/UL (ref 4–11)

## 2024-06-29 PROCEDURE — 6360000002 HC RX W HCPCS: Performed by: PHYSICIAN ASSISTANT

## 2024-06-29 PROCEDURE — 93010 ELECTROCARDIOGRAM REPORT: CPT | Performed by: INTERNAL MEDICINE

## 2024-06-29 PROCEDURE — 6360000002 HC RX W HCPCS: Performed by: STUDENT IN AN ORGANIZED HEALTH CARE EDUCATION/TRAINING PROGRAM

## 2024-06-29 PROCEDURE — 36415 COLL VENOUS BLD VENIPUNCTURE: CPT

## 2024-06-29 PROCEDURE — 96376 TX/PRO/DX INJ SAME DRUG ADON: CPT

## 2024-06-29 PROCEDURE — 2580000003 HC RX 258: Performed by: PHYSICIAN ASSISTANT

## 2024-06-29 PROCEDURE — G0378 HOSPITAL OBSERVATION PER HR: HCPCS

## 2024-06-29 PROCEDURE — 96361 HYDRATE IV INFUSION ADD-ON: CPT

## 2024-06-29 PROCEDURE — 80048 BASIC METABOLIC PNL TOTAL CA: CPT

## 2024-06-29 PROCEDURE — 74176 CT ABD & PELVIS W/O CONTRAST: CPT

## 2024-06-29 PROCEDURE — 6370000000 HC RX 637 (ALT 250 FOR IP): Performed by: PHYSICIAN ASSISTANT

## 2024-06-29 PROCEDURE — 85025 COMPLETE CBC W/AUTO DIFF WBC: CPT

## 2024-06-29 PROCEDURE — 2580000003 HC RX 258: Performed by: INTERNAL MEDICINE

## 2024-06-29 RX ORDER — ONDANSETRON 2 MG/ML
4 INJECTION INTRAMUSCULAR; INTRAVENOUS EVERY 4 HOURS PRN
Status: DISCONTINUED | OUTPATIENT
Start: 2024-06-29 | End: 2024-07-03 | Stop reason: HOSPADM

## 2024-06-29 RX ORDER — SODIUM CHLORIDE 9 MG/ML
INJECTION, SOLUTION INTRAVENOUS CONTINUOUS
Status: ACTIVE | OUTPATIENT
Start: 2024-06-29 | End: 2024-06-29

## 2024-06-29 RX ADMIN — DULOXETINE HYDROCHLORIDE 60 MG: 60 CAPSULE, DELAYED RELEASE ORAL at 11:39

## 2024-06-29 RX ADMIN — HEPARIN SODIUM 5000 UNITS: 5000 INJECTION INTRAVENOUS; SUBCUTANEOUS at 20:35

## 2024-06-29 RX ADMIN — ASPIRIN 81 MG 81 MG: 81 TABLET ORAL at 11:39

## 2024-06-29 RX ADMIN — ONDANSETRON 4 MG: 2 INJECTION INTRAMUSCULAR; INTRAVENOUS at 17:05

## 2024-06-29 RX ADMIN — TICAGRELOR 90 MG: 90 TABLET ORAL at 11:39

## 2024-06-29 RX ADMIN — PREDNISONE 10 MG: 10 TABLET ORAL at 11:40

## 2024-06-29 RX ADMIN — FERROUS SULFATE TAB 325 MG (65 MG ELEMENTAL FE) 325 MG: 325 (65 FE) TAB at 06:23

## 2024-06-29 RX ADMIN — HEPARIN SODIUM 5000 UNITS: 5000 INJECTION INTRAVENOUS; SUBCUTANEOUS at 14:51

## 2024-06-29 RX ADMIN — ISOSORBIDE MONONITRATE 60 MG: 60 TABLET, EXTENDED RELEASE ORAL at 20:33

## 2024-06-29 RX ADMIN — SODIUM CHLORIDE, PRESERVATIVE FREE 10 ML: 5 INJECTION INTRAVENOUS at 20:35

## 2024-06-29 RX ADMIN — LINACLOTIDE 290 MCG: 145 CAPSULE, GELATIN COATED ORAL at 06:23

## 2024-06-29 RX ADMIN — AMITRIPTYLINE HYDROCHLORIDE 40 MG: 10 TABLET, FILM COATED ORAL at 20:34

## 2024-06-29 RX ADMIN — MONTELUKAST SODIUM 10 MG: 10 TABLET, FILM COATED ORAL at 20:34

## 2024-06-29 RX ADMIN — HYDRALAZINE HYDROCHLORIDE 25 MG: 25 TABLET ORAL at 14:54

## 2024-06-29 RX ADMIN — SODIUM CHLORIDE, PRESERVATIVE FREE 10 ML: 5 INJECTION INTRAVENOUS at 11:40

## 2024-06-29 RX ADMIN — ONDANSETRON 4 MG: 2 INJECTION INTRAMUSCULAR; INTRAVENOUS at 07:23

## 2024-06-29 RX ADMIN — LEVETIRACETAM 500 MG: 500 TABLET, FILM COATED ORAL at 20:34

## 2024-06-29 RX ADMIN — TICAGRELOR 90 MG: 90 TABLET ORAL at 20:34

## 2024-06-29 RX ADMIN — RANOLAZINE 1000 MG: 500 TABLET, EXTENDED RELEASE ORAL at 11:40

## 2024-06-29 RX ADMIN — INSULIN GLARGINE 8 UNITS: 100 INJECTION, SOLUTION SUBCUTANEOUS at 20:39

## 2024-06-29 RX ADMIN — LEVETIRACETAM 500 MG: 500 TABLET, FILM COATED ORAL at 11:39

## 2024-06-29 RX ADMIN — RANOLAZINE 1000 MG: 500 TABLET, EXTENDED RELEASE ORAL at 20:34

## 2024-06-29 RX ADMIN — HEPARIN SODIUM 5000 UNITS: 5000 INJECTION INTRAVENOUS; SUBCUTANEOUS at 06:24

## 2024-06-29 RX ADMIN — SODIUM CHLORIDE: 9 INJECTION, SOLUTION INTRAVENOUS at 17:10

## 2024-06-29 RX ADMIN — HYDRALAZINE HYDROCHLORIDE 25 MG: 25 TABLET ORAL at 20:34

## 2024-06-29 RX ADMIN — QUETIAPINE FUMARATE 50 MG: 25 TABLET ORAL at 20:34

## 2024-06-29 RX ADMIN — ATORVASTATIN CALCIUM 80 MG: 80 TABLET, FILM COATED ORAL at 20:34

## 2024-06-29 RX ADMIN — PANTOPRAZOLE SODIUM 40 MG: 40 TABLET, DELAYED RELEASE ORAL at 06:24

## 2024-06-29 NOTE — PROGRESS NOTES
Nephrology Consult Note  [phone]   [employer]           Reason for Consult: Acute kidney injury    HISTORY OF PRESENT ILLNESS:                The patient is a 68 y.o.female with significant past medical history of CKD, HTN, DM II, Afib, CAD/PCI, CVA, CHF, COPD, Fibromyalgia came in with chest pain  CXR with no acute changes  Cardiology consulted for further evaluation  We are consulted for KOLBY  She follows with Dr. Chugn  Her baseline creatinine appears to be mid 1s  Creatinine on admission was 1.8 and is 1.2 at the time of consu of note finerenone and losartan listed in home medications  Patient seen and examined  Denies NSAID usage  Denies urinary symptoms      Current meds  Scheduled Meds:   docusate sodium  100 mg Oral BID    DULoxetine  60 mg Oral Daily    ferrous sulfate  325 mg Oral Daily with breakfast    linaclotide  290 mcg Oral QAM AC    pantoprazole  40 mg Oral QAM AC    predniSONE  10 mg Oral Daily    heparin (porcine)  5,000 Units SubCUTAneous 3 times per day    aspirin  81 mg Oral Daily    atorvastatin  80 mg Oral Nightly    amitriptyline  40 mg Oral Nightly    hydrALAZINE  25 mg Oral TID    insulin glargine  8 Units SubCUTAneous BID    isosorbide mononitrate  60 mg Oral BID    levETIRAcetam  500 mg Oral BID    montelukast  10 mg Oral Nightly    QUEtiapine  50 mg Oral Nightly    ranolazine  1,000 mg Oral BID    ticagrelor  90 mg Oral BID    insulin lispro  0-4 Units SubCUTAneous TID WC    insulin lispro  0-4 Units SubCUTAneous Nightly    sodium chloride flush  10 mL IntraVENous 2 times per day    mometasone-formoterol  2 puff Inhalation BID RT    insulin lispro  4 Units SubCUTAneous TID WC     Continuous Infusions:   sodium chloride 50 mL/hr at 06/28/24 1754    dextrose      sodium chloride       PRN Meds:.cyclobenzaprine, diclofenac sodium, fluticasone, midodrine, albuterol, hydrOXYzine HCl, traMADol, dextrose bolus **OR** dextrose bolus, glucagon (rDNA), dextrose, sodium

## 2024-06-29 NOTE — PROGRESS NOTES
Pt called out for bathroom. By the time this RN entered pt's room pt had diarrhea in her bed and then PCA walked pt to bathroom. PCA helped pt in bathroom and then helped her to her chair. Pt reports feeling dizzy and nauseated. PCA and RN clean pt up, give her orange juice and put new gown on her. We help her back to bed. Pt reports feeling better. BP now 106/54 which is down from SBP of 140's earlier this morning.

## 2024-06-29 NOTE — PLAN OF CARE
Problem: Discharge Planning  Goal: Discharge to home or other facility with appropriate resources  6/28/2024 2244 by Trent Odell RN  Outcome: Progressing  6/28/2024 1849 by Yamilka aGllagher RN  Outcome: Progressing     Problem: Safety - Adult  Goal: Free from fall injury  6/28/2024 2244 by Trent Odell RN  Outcome: Progressing  6/28/2024 1849 by Yamilka Gallagher RN  Outcome: Progressing     Problem: Pain  Goal: Verbalizes/displays adequate comfort level or baseline comfort level  6/28/2024 2244 by Trent Odell RN  Outcome: Progressing  6/28/2024 1849 by Yamilka Gallagher RN  Outcome: Progressing     Problem: Chronic Conditions and Co-morbidities  Goal: Patient's chronic conditions and co-morbidity symptoms are monitored and maintained or improved  6/28/2024 2244 by Trent Odell RN  Outcome: Progressing  6/28/2024 1849 by Yamilka Gallagher RN  Outcome: Progressing

## 2024-06-29 NOTE — PROGRESS NOTES
[facility]    Hospitalist Progress Note:      Name:  Maren Meza /Age/Sex: 1955  (68 y.o. female)   MRN & CSN:  [id number] & [id number] Encounter Date: 2024    Location:  [id number]-2072/5902-01 PCP: Marin Cardenas MD     Attending:Venu Andrade MD  Admission Date: 2024      Hospital Day: 3    Assessment:  Maren Meza is a 68 y.o. female with medical history including CAD, PCI, HTN, ischemic cardiomyopathy, DM 2, hyperlipidemia and brain tumor presented to ED for evaluation of chest pain.  Chest pain, intermittent, unclear etio:mild troponin elevation. F/b cardiology team, recent ischemic evaluation (angiography) without obstructive disease, no further ischemic w/u recommended  KOLBY over CKD 3, prerenal: S.creat 1.8--> 2.2--> 2.0  Abd pain mainly in RLQ, with nausea, unclear etio: hx of IBS  CAD: Hx. MVPCI, mild nonobstructive ISR by 3/2024 [facility]  Ischemic cardiomyopathy, LVEF 50%  Hypertension  Hyperlipidemia   Diabetes mellitus  Ch Headache d/t Brain tumor/meningioma - on prednisone 10 mg daily    Plan:   Obtain CT abd/pelvis w/o contrast  Cont IVFs  If diarrhea, check stool for c.diff  Cont hold losartan, d/w nephrology team, cont hold on DC,f/u as outpt in clinic  Tele, noted cardiology team, no ischemic w/u  Avoid nephrotoxic meds. Monitor urine output. Monitor renal functions and lytes. Noted nephrology team input.  Current meds reviewed, adjusted.   See orders  Diet ADULT DIET; Regular; 4 carb choices (60 gm/meal)   DVT Prophylaxis [x] Lovenox, []  Heparin, [] SCDs, [] Ambulation,  [] Eliquis, [] Xarelto  [] Coumadin   Code Status Full Code   Disposition Expected Disposition: Home vs ECF vs Hm with C  Estimated Date of Discharge:  1-2 days  Reason for continued admission: KOLBY     Subjective:  Pt. seen and examined at bedside.  Overall symptoms are worsening.  Reports nausea and pain in R lower side of abdomen. States greenish brown stool earlier which was little

## 2024-06-29 NOTE — PROGRESS NOTES
BS is 68, this RN gave patient apple juice. Pt reports feeling nauseated. This RN will medicate with zofran per order.

## 2024-06-30 LAB
ANION GAP SERPL CALCULATED.3IONS-SCNC: 13 MMOL/L (ref 3–16)
BASOPHILS # BLD: 0 K/UL (ref 0–0.2)
BASOPHILS NFR BLD: 0.3 %
BUN SERPL-MCNC: 39 MG/DL (ref 7–20)
CALCIUM SERPL-MCNC: 8.6 MG/DL (ref 8.3–10.6)
CHLORIDE SERPL-SCNC: 111 MMOL/L (ref 99–110)
CO2 SERPL-SCNC: 16 MMOL/L (ref 21–32)
CREAT SERPL-MCNC: 2.4 MG/DL (ref 0.6–1.2)
DEPRECATED RDW RBC AUTO: 16 % (ref 12.4–15.4)
EOSINOPHIL # BLD: 0 K/UL (ref 0–0.6)
EOSINOPHIL NFR BLD: 0.1 %
GFR SERPLBLD CREATININE-BSD FMLA CKD-EPI: 22 ML/MIN/{1.73_M2}
GLUCOSE BLD-MCNC: 114 MG/DL (ref 70–99)
GLUCOSE BLD-MCNC: 117 MG/DL (ref 70–99)
GLUCOSE BLD-MCNC: 138 MG/DL (ref 70–99)
GLUCOSE BLD-MCNC: 158 MG/DL (ref 70–99)
GLUCOSE BLD-MCNC: 76 MG/DL (ref 70–99)
GLUCOSE BLD-MCNC: 79 MG/DL (ref 70–99)
GLUCOSE SERPL-MCNC: 88 MG/DL (ref 70–99)
HCT VFR BLD AUTO: 26.4 % (ref 36–48)
HGB BLD-MCNC: 8.7 G/DL (ref 12–16)
LYMPHOCYTES # BLD: 0.8 K/UL (ref 1–5.1)
LYMPHOCYTES NFR BLD: 16.5 %
MCH RBC QN AUTO: 30.2 PG (ref 26–34)
MCHC RBC AUTO-ENTMCNC: 33.1 G/DL (ref 31–36)
MCV RBC AUTO: 91.1 FL (ref 80–100)
MONOCYTES # BLD: 0.2 K/UL (ref 0–1.3)
MONOCYTES NFR BLD: 5.1 %
NEUTROPHILS # BLD: 3.7 K/UL (ref 1.7–7.7)
NEUTROPHILS NFR BLD: 78 %
PERFORMED ON: ABNORMAL
PERFORMED ON: NORMAL
PERFORMED ON: NORMAL
PLATELET # BLD AUTO: 231 K/UL (ref 135–450)
PMV BLD AUTO: 7.6 FL (ref 5–10.5)
POTASSIUM SERPL-SCNC: 3.9 MMOL/L (ref 3.5–5.1)
RBC # BLD AUTO: 2.89 M/UL (ref 4–5.2)
SODIUM SERPL-SCNC: 140 MMOL/L (ref 136–145)
WBC # BLD AUTO: 4.8 K/UL (ref 4–11)

## 2024-06-30 PROCEDURE — 6370000000 HC RX 637 (ALT 250 FOR IP): Performed by: PHYSICIAN ASSISTANT

## 2024-06-30 PROCEDURE — 96376 TX/PRO/DX INJ SAME DRUG ADON: CPT

## 2024-06-30 PROCEDURE — 80048 BASIC METABOLIC PNL TOTAL CA: CPT

## 2024-06-30 PROCEDURE — 6360000002 HC RX W HCPCS: Performed by: STUDENT IN AN ORGANIZED HEALTH CARE EDUCATION/TRAINING PROGRAM

## 2024-06-30 PROCEDURE — 6370000000 HC RX 637 (ALT 250 FOR IP): Performed by: HOSPITALIST

## 2024-06-30 PROCEDURE — 85025 COMPLETE CBC W/AUTO DIFF WBC: CPT

## 2024-06-30 PROCEDURE — 2580000003 HC RX 258: Performed by: PHYSICIAN ASSISTANT

## 2024-06-30 PROCEDURE — G0378 HOSPITAL OBSERVATION PER HR: HCPCS

## 2024-06-30 PROCEDURE — 2580000003 HC RX 258: Performed by: HOSPITALIST

## 2024-06-30 PROCEDURE — 6360000002 HC RX W HCPCS: Performed by: HOSPITALIST

## 2024-06-30 PROCEDURE — 36415 COLL VENOUS BLD VENIPUNCTURE: CPT

## 2024-06-30 RX ORDER — INSULIN GLARGINE 100 [IU]/ML
6 INJECTION, SOLUTION SUBCUTANEOUS 2 TIMES DAILY
Status: DISCONTINUED | OUTPATIENT
Start: 2024-06-30 | End: 2024-07-03 | Stop reason: HOSPADM

## 2024-06-30 RX ORDER — SODIUM CHLORIDE, SODIUM LACTATE, POTASSIUM CHLORIDE, CALCIUM CHLORIDE 600; 310; 30; 20 MG/100ML; MG/100ML; MG/100ML; MG/100ML
INJECTION, SOLUTION INTRAVENOUS CONTINUOUS
Status: ACTIVE | OUTPATIENT
Start: 2024-06-30 | End: 2024-07-01

## 2024-06-30 RX ORDER — OXYCODONE HYDROCHLORIDE 5 MG/1
5 TABLET ORAL EVERY 4 HOURS PRN
Status: DISCONTINUED | OUTPATIENT
Start: 2024-06-30 | End: 2024-07-03 | Stop reason: HOSPADM

## 2024-06-30 RX ORDER — AMLODIPINE BESYLATE 5 MG/1
5 TABLET ORAL DAILY
Status: DISCONTINUED | OUTPATIENT
Start: 2024-06-30 | End: 2024-07-03 | Stop reason: HOSPADM

## 2024-06-30 RX ADMIN — FERROUS SULFATE TAB 325 MG (65 MG ELEMENTAL FE) 325 MG: 325 (65 FE) TAB at 08:25

## 2024-06-30 RX ADMIN — HYDRALAZINE HYDROCHLORIDE 25 MG: 25 TABLET ORAL at 15:17

## 2024-06-30 RX ADMIN — RANOLAZINE 1000 MG: 500 TABLET, EXTENDED RELEASE ORAL at 08:25

## 2024-06-30 RX ADMIN — HEPARIN SODIUM 5000 UNITS: 5000 INJECTION INTRAVENOUS; SUBCUTANEOUS at 06:35

## 2024-06-30 RX ADMIN — PANTOPRAZOLE SODIUM 40 MG: 40 TABLET, DELAYED RELEASE ORAL at 06:35

## 2024-06-30 RX ADMIN — MONTELUKAST SODIUM 10 MG: 10 TABLET, FILM COATED ORAL at 19:58

## 2024-06-30 RX ADMIN — ONDANSETRON 4 MG: 2 INJECTION INTRAMUSCULAR; INTRAVENOUS at 08:27

## 2024-06-30 RX ADMIN — HYDRALAZINE HYDROCHLORIDE 25 MG: 25 TABLET ORAL at 08:26

## 2024-06-30 RX ADMIN — LEVETIRACETAM 500 MG: 500 TABLET, FILM COATED ORAL at 19:58

## 2024-06-30 RX ADMIN — ATORVASTATIN CALCIUM 80 MG: 80 TABLET, FILM COATED ORAL at 19:58

## 2024-06-30 RX ADMIN — ISOSORBIDE MONONITRATE 60 MG: 60 TABLET, EXTENDED RELEASE ORAL at 08:26

## 2024-06-30 RX ADMIN — LINACLOTIDE 290 MCG: 145 CAPSULE, GELATIN COATED ORAL at 06:35

## 2024-06-30 RX ADMIN — RANOLAZINE 1000 MG: 500 TABLET, EXTENDED RELEASE ORAL at 19:58

## 2024-06-30 RX ADMIN — ASPIRIN 81 MG 81 MG: 81 TABLET ORAL at 08:25

## 2024-06-30 RX ADMIN — SODIUM CHLORIDE, PRESERVATIVE FREE 10 ML: 5 INJECTION INTRAVENOUS at 08:26

## 2024-06-30 RX ADMIN — HYDRALAZINE HYDROCHLORIDE 25 MG: 25 TABLET ORAL at 19:58

## 2024-06-30 RX ADMIN — PREDNISONE 10 MG: 10 TABLET ORAL at 08:26

## 2024-06-30 RX ADMIN — SODIUM CHLORIDE, POTASSIUM CHLORIDE, SODIUM LACTATE AND CALCIUM CHLORIDE: 600; 310; 30; 20 INJECTION, SOLUTION INTRAVENOUS at 09:25

## 2024-06-30 RX ADMIN — SODIUM CHLORIDE, POTASSIUM CHLORIDE, SODIUM LACTATE AND CALCIUM CHLORIDE: 600; 310; 30; 20 INJECTION, SOLUTION INTRAVENOUS at 19:10

## 2024-06-30 RX ADMIN — ISOSORBIDE MONONITRATE 60 MG: 60 TABLET, EXTENDED RELEASE ORAL at 19:58

## 2024-06-30 RX ADMIN — LEVETIRACETAM 500 MG: 500 TABLET, FILM COATED ORAL at 08:24

## 2024-06-30 RX ADMIN — TICAGRELOR 90 MG: 90 TABLET ORAL at 19:58

## 2024-06-30 RX ADMIN — TICAGRELOR 90 MG: 90 TABLET ORAL at 08:25

## 2024-06-30 RX ADMIN — DULOXETINE HYDROCHLORIDE 60 MG: 60 CAPSULE, DELAYED RELEASE ORAL at 08:26

## 2024-06-30 RX ADMIN — AMITRIPTYLINE HYDROCHLORIDE 40 MG: 10 TABLET, FILM COATED ORAL at 19:58

## 2024-06-30 RX ADMIN — QUETIAPINE FUMARATE 50 MG: 25 TABLET ORAL at 19:58

## 2024-06-30 RX ADMIN — SODIUM CHLORIDE, PRESERVATIVE FREE 10 ML: 5 INJECTION INTRAVENOUS at 19:59

## 2024-06-30 RX ADMIN — AMLODIPINE BESYLATE 5 MG: 5 TABLET ORAL at 11:50

## 2024-06-30 RX ADMIN — INSULIN GLARGINE 6 UNITS: 100 INJECTION, SOLUTION SUBCUTANEOUS at 19:58

## 2024-06-30 RX ADMIN — OXYCODONE 5 MG: 5 TABLET ORAL at 18:51

## 2024-06-30 ASSESSMENT — PAIN SCALES - GENERAL
PAINLEVEL_OUTOF10: 9
PAINLEVEL_OUTOF10: 6

## 2024-06-30 NOTE — PROGRESS NOTES
Nephrology Consult Note  [phone]   [employer]           Reason for Consult: Acute kidney injury    HISTORY OF PRESENT ILLNESS:       68 y.o.female with significant past medical history of CKD, HTN, DM II, Afib, CAD/PCI, CVA, CHF, COPD, Fibromyalgia came in with chest pain  CXR with no acute changes  Cardiology consulted for further evaluation  We are consulted for KOLBY  She follows with Dr. Chung  Her baseline creatinine appears to be mid 1s  of note finerenone and losartan listed in home medications  Patient seen and examined  Denies NSAID usage  Denies urinary symptoms    Interval history  Reports diarrhea starting yesterday. Multiple BMs      Current meds  Scheduled Meds:   insulin glargine  6 Units SubCUTAneous BID    docusate sodium  100 mg Oral BID    DULoxetine  60 mg Oral Daily    ferrous sulfate  325 mg Oral Daily with breakfast    linaclotide  290 mcg Oral QAM AC    pantoprazole  40 mg Oral QAM AC    predniSONE  10 mg Oral Daily    heparin (porcine)  5,000 Units SubCUTAneous 3 times per day    aspirin  81 mg Oral Daily    atorvastatin  80 mg Oral Nightly    amitriptyline  40 mg Oral Nightly    hydrALAZINE  25 mg Oral TID    isosorbide mononitrate  60 mg Oral BID    levETIRAcetam  500 mg Oral BID    montelukast  10 mg Oral Nightly    QUEtiapine  50 mg Oral Nightly    ranolazine  1,000 mg Oral BID    ticagrelor  90 mg Oral BID    insulin lispro  0-4 Units SubCUTAneous TID WC    insulin lispro  0-4 Units SubCUTAneous Nightly    sodium chloride flush  10 mL IntraVENous 2 times per day    mometasone-formoterol  2 puff Inhalation BID RT    insulin lispro  4 Units SubCUTAneous TID WC     Continuous Infusions:   lactated ringers IV soln 100 mL/hr at 06/30/24 0925    dextrose      sodium chloride       PRN Meds:.ondansetron, cyclobenzaprine, diclofenac sodium, fluticasone, midodrine, albuterol, hydrOXYzine HCl, traMADol, dextrose bolus **OR** dextrose bolus, glucagon (rDNA), dextrose, sodium  chloride flush, sodium chloride, acetaminophen **OR** acetaminophen, nitroGLYCERIN         PHYSICAL EXAM:      Vitals:    BP (!) 144/68   Pulse 83   Temp 97.4 °F (36.3 °C) (Temporal)   Resp 16   Ht 1.524 m (5')   Wt 51 kg (112 lb 8 oz)   SpO2 100%   BMI 21.97 kg/m²   I/O last 3 completed shifts:  In: 360 [P.O.:360]  Out: -   No intake/output data recorded.    Physical Exam:  General : AAOx3, not in pain or respiratory distress, resting in bed  HEENT : normocephalic, atraumatic, mucosa moist, no palor or icterus  CVS: S1 S2 normal, regular rhythm, no murmurs or rubs.  Lungs: decreased breath sounds at the bases  Abd: Soft, bowel sounds normal, non-tender.  Ext: No edema  Skin: Warm.  No rashes appreciated.  : bladder non-distended, no tenderness over the bladder  Neuro: Alert and oriented x 3, nonfocal.  Joints: No erythema noted over joints.    DATA:    CBC with Differential:    Lab Results   Component Value Date/Time    WBC 4.8 06/30/2024 04:42 AM    RBC 2.89 06/30/2024 04:42 AM    HGB 8.7 06/30/2024 04:42 AM    HCT 26.4 06/30/2024 04:42 AM     06/30/2024 04:42 AM    MCV 91.1 06/30/2024 04:42 AM    MCH 30.2 06/30/2024 04:42 AM    MCHC 33.1 06/30/2024 04:42 AM    RDW 16.0 06/30/2024 04:42 AM    BANDSPCT 1.0 07/15/2011 12:29 PM    LYMPHOPCT 16.5 06/30/2024 04:42 AM    MONOPCT 5.1 06/30/2024 04:42 AM    EOSPCT 0.1 06/30/2024 04:42 AM    BASOPCT 0.3 06/30/2024 04:42 AM    MONOSABS 0.2 06/30/2024 04:42 AM    EOSABS 0.0 06/30/2024 04:42 AM    BASOSABS 0.0 06/30/2024 04:42 AM    DIFFTYPE Auto 05/23/2013 02:56 PM     BMP:    Lab Results   Component Value Date/Time     06/30/2024 04:42 AM    K 3.9 06/30/2024 04:42 AM    K 5.0 06/28/2024 04:30 AM     06/30/2024 04:42 AM    CO2 16 06/30/2024 04:42 AM    BUN 39 06/30/2024 04:42 AM    CREATININE 2.4 06/30/2024 04:42 AM    CALCIUM 8.6 06/30/2024 04:42 AM    GFRAA 46 10/09/2022 06:18 AM    GFRAA 60 05/23/2013 02:56 PM    LABGLOM 22 06/30/2024 04:42 AM

## 2024-06-30 NOTE — PROGRESS NOTES
[facility]    Hospitalist Progress Note:      Name:  Maren Meza /Age/Sex: 1955  (68 y.o. female)   MRN & CSN:  [id number] & [id number] Encounter Date: 2024    Location:  [id number] PCP: Marin Cardenas MD     Attending:Venu Andrade MD  Admission Date: 2024      Hospital Day: 4    Assessment:  Maren Meza is a 68 y.o. female with medical history including CAD, PCI, HTN, ischemic cardiomyopathy, DM 2, hyperlipidemia and brain tumor presented to ED for evaluation of chest pain.  Chest pain, intermittent, unclear etio:mild troponin elevation. F/b cardiology team, recent ischemic evaluation (angiography) without obstructive disease, no further ischemic w/u recommended  Abd pain with nausea and diarrhea likely Acute gastroenteritis: hx of IBS  KOLBY over CKD 3, prerenal: S.creat 1.8--> 2.2--> 2.0--> 2.4  CAD: Hx. MVPCI, mild nonobstructive ISR by 3/2024 [facility]  Ischemic cardiomyopathy, LVEF 50%  Hypertension: mild acceleration   Hyperlipidemia   Diabetes mellitus: BS stable  Ch Headache d/t Brain tumor/meningioma - on prednisone 10 mg daily    Plan:   Check stool for c.diff  Cont FLD, IVF  Cont hold losartan and finerenone. Worsening status, f/u nephrology team  Cont tele, f/u with cardiology team  Avoid nephrotoxic meds. Monitor urine output. Monitor renal functions and lytes.   decrease basal, cont CSIC, check FSBS ACHS, monitor for hypoglycemia  Current meds reviewed, adjusted.   See orders  Diet ADULT DIET; Full Liquid   DVT Prophylaxis [] Lovenox, [x]  Heparin, [] SCDs, [] Ambulation,  [] Eliquis, [] Xarelto  [] Coumadin   Code Status Full Code   Disposition Expected Disposition: Home vs ECF vs Hm with [facility]  Estimated Date of Discharge:  1-2 days  Reason for continued admission: KOLBY     Subjective:  Pt. seen and examined at bedside.  Overall symptoms are worsening.  Reports mild abdominal pain and diarrhea. States stool is loose watery. No blood in stool.  States no

## 2024-07-01 ENCOUNTER — ANESTHESIA (OUTPATIENT)
Dept: ENDOSCOPY | Age: 69
DRG: 378 | End: 2024-07-01
Payer: COMMERCIAL

## 2024-07-01 ENCOUNTER — ANESTHESIA EVENT (OUTPATIENT)
Dept: ENDOSCOPY | Age: 69
DRG: 378 | End: 2024-07-01
Payer: COMMERCIAL

## 2024-07-01 PROBLEM — D62 ACUTE BLOOD LOSS ANEMIA: Status: ACTIVE | Noted: 2024-07-01

## 2024-07-01 LAB
ANION GAP SERPL CALCULATED.3IONS-SCNC: 12 MMOL/L (ref 3–16)
BASOPHILS # BLD: 0 K/UL (ref 0–0.2)
BASOPHILS NFR BLD: 0.2 %
BETA-HYDROXYBUTYRATE: 0.3 MMOL/L (ref 0–0.27)
BUN SERPL-MCNC: 30 MG/DL (ref 7–20)
C DIFF TOX A+B STL QL IA: NORMAL
CALCIUM SERPL-MCNC: 8.8 MG/DL (ref 8.3–10.6)
CHLORIDE SERPL-SCNC: 111 MMOL/L (ref 99–110)
CO2 SERPL-SCNC: 16 MMOL/L (ref 21–32)
CREAT SERPL-MCNC: 2.2 MG/DL (ref 0.6–1.2)
DEPRECATED RDW RBC AUTO: 15.8 % (ref 12.4–15.4)
EOSINOPHIL # BLD: 0 K/UL (ref 0–0.6)
EOSINOPHIL NFR BLD: 0.1 %
GFR SERPLBLD CREATININE-BSD FMLA CKD-EPI: 24 ML/MIN/{1.73_M2}
GLUCOSE BLD-MCNC: 124 MG/DL (ref 70–99)
GLUCOSE BLD-MCNC: 66 MG/DL (ref 70–99)
GLUCOSE BLD-MCNC: 67 MG/DL (ref 70–99)
GLUCOSE BLD-MCNC: 71 MG/DL (ref 70–99)
GLUCOSE BLD-MCNC: 73 MG/DL (ref 70–99)
GLUCOSE BLD-MCNC: 82 MG/DL (ref 70–99)
GLUCOSE SERPL-MCNC: 71 MG/DL (ref 70–99)
HCT VFR BLD AUTO: 25.2 % (ref 36–48)
HCT VFR BLD AUTO: 26 % (ref 36–48)
HEMOCCULT STL QL: ABNORMAL
HGB BLD-MCNC: 8.1 G/DL (ref 12–16)
HGB BLD-MCNC: 8.5 G/DL (ref 12–16)
LACTATE BLDV-SCNC: 0.7 MMOL/L (ref 0.4–2)
LYMPHOCYTES # BLD: 0.7 K/UL (ref 1–5.1)
LYMPHOCYTES NFR BLD: 6.4 %
MCH RBC QN AUTO: 29.8 PG (ref 26–34)
MCHC RBC AUTO-ENTMCNC: 32.7 G/DL (ref 31–36)
MCV RBC AUTO: 91.2 FL (ref 80–100)
MONOCYTES # BLD: 0.4 K/UL (ref 0–1.3)
MONOCYTES NFR BLD: 4.2 %
NEUTROPHILS # BLD: 9.4 K/UL (ref 1.7–7.7)
NEUTROPHILS NFR BLD: 89.1 %
PERFORMED ON: ABNORMAL
PERFORMED ON: NORMAL
PLATELET # BLD AUTO: 220 K/UL (ref 135–450)
PMV BLD AUTO: 7.7 FL (ref 5–10.5)
POTASSIUM SERPL-SCNC: 3.3 MMOL/L (ref 3.5–5.1)
RBC # BLD AUTO: 2.85 M/UL (ref 4–5.2)
SLIDE REVIEW: ABNORMAL
SODIUM SERPL-SCNC: 139 MMOL/L (ref 136–145)
WBC # BLD AUTO: 10.5 K/UL (ref 4–11)

## 2024-07-01 PROCEDURE — 6370000000 HC RX 637 (ALT 250 FOR IP): Performed by: PHYSICIAN ASSISTANT

## 2024-07-01 PROCEDURE — 6370000000 HC RX 637 (ALT 250 FOR IP): Performed by: HOSPITALIST

## 2024-07-01 PROCEDURE — 3700000000 HC ANESTHESIA ATTENDED CARE: Performed by: INTERNAL MEDICINE

## 2024-07-01 PROCEDURE — 2580000003 HC RX 258: Performed by: NURSE ANESTHETIST, CERTIFIED REGISTERED

## 2024-07-01 PROCEDURE — 6370000000 HC RX 637 (ALT 250 FOR IP): Performed by: INTERNAL MEDICINE

## 2024-07-01 PROCEDURE — 85025 COMPLETE CBC W/AUTO DIFF WBC: CPT

## 2024-07-01 PROCEDURE — 6360000002 HC RX W HCPCS: Performed by: NURSE ANESTHETIST, CERTIFIED REGISTERED

## 2024-07-01 PROCEDURE — 82010 KETONE BODYS QUAN: CPT

## 2024-07-01 PROCEDURE — 2580000003 HC RX 258: Performed by: PHYSICIAN ASSISTANT

## 2024-07-01 PROCEDURE — 96375 TX/PRO/DX INJ NEW DRUG ADDON: CPT

## 2024-07-01 PROCEDURE — 6360000002 HC RX W HCPCS: Performed by: HOSPITALIST

## 2024-07-01 PROCEDURE — 3609012400 HC EGD TRANSORAL BIOPSY SINGLE/MULTIPLE: Performed by: INTERNAL MEDICINE

## 2024-07-01 PROCEDURE — 87449 NOS EACH ORGANISM AG IA: CPT

## 2024-07-01 PROCEDURE — 94760 N-INVAS EAR/PLS OXIMETRY 1: CPT

## 2024-07-01 PROCEDURE — 85014 HEMATOCRIT: CPT

## 2024-07-01 PROCEDURE — 36415 COLL VENOUS BLD VENIPUNCTURE: CPT

## 2024-07-01 PROCEDURE — 88305 TISSUE EXAM BY PATHOLOGIST: CPT

## 2024-07-01 PROCEDURE — 2709999900 HC NON-CHARGEABLE SUPPLY: Performed by: INTERNAL MEDICINE

## 2024-07-01 PROCEDURE — 2500000003 HC RX 250 WO HCPCS: Performed by: ANESTHESIOLOGY

## 2024-07-01 PROCEDURE — 85018 HEMOGLOBIN: CPT

## 2024-07-01 PROCEDURE — 96376 TX/PRO/DX INJ SAME DRUG ADON: CPT

## 2024-07-01 PROCEDURE — 7100000000 HC PACU RECOVERY - FIRST 15 MIN: Performed by: INTERNAL MEDICINE

## 2024-07-01 PROCEDURE — 1200000000 HC SEMI PRIVATE

## 2024-07-01 PROCEDURE — 2580000003 HC RX 258: Performed by: HOSPITALIST

## 2024-07-01 PROCEDURE — 87506 IADNA-DNA/RNA PROBE TQ 6-11: CPT

## 2024-07-01 PROCEDURE — 0DB68ZX EXCISION OF STOMACH, VIA NATURAL OR ARTIFICIAL OPENING ENDOSCOPIC, DIAGNOSTIC: ICD-10-PCS | Performed by: INTERNAL MEDICINE

## 2024-07-01 PROCEDURE — 80048 BASIC METABOLIC PNL TOTAL CA: CPT

## 2024-07-01 PROCEDURE — 6360000002 HC RX W HCPCS: Performed by: PHYSICIAN ASSISTANT

## 2024-07-01 PROCEDURE — 2500000003 HC RX 250 WO HCPCS: Performed by: NURSE ANESTHETIST, CERTIFIED REGISTERED

## 2024-07-01 PROCEDURE — 82270 OCCULT BLOOD FECES: CPT

## 2024-07-01 PROCEDURE — 83605 ASSAY OF LACTIC ACID: CPT

## 2024-07-01 PROCEDURE — 87324 CLOSTRIDIUM AG IA: CPT

## 2024-07-01 PROCEDURE — 7100000001 HC PACU RECOVERY - ADDTL 15 MIN: Performed by: INTERNAL MEDICINE

## 2024-07-01 RX ORDER — LIDOCAINE HYDROCHLORIDE 20 MG/ML
INJECTION, SOLUTION INFILTRATION; PERINEURAL PRN
Status: DISCONTINUED | OUTPATIENT
Start: 2024-07-01 | End: 2024-07-01 | Stop reason: SDUPTHER

## 2024-07-01 RX ORDER — POTASSIUM CHLORIDE 20 MEQ/1
40 TABLET, EXTENDED RELEASE ORAL 2 TIMES DAILY
Status: DISCONTINUED | OUTPATIENT
Start: 2024-07-01 | End: 2024-07-03 | Stop reason: HOSPADM

## 2024-07-01 RX ORDER — PANTOPRAZOLE SODIUM 40 MG/10ML
80 INJECTION, POWDER, LYOPHILIZED, FOR SOLUTION INTRAVENOUS ONCE
Status: COMPLETED | OUTPATIENT
Start: 2024-07-01 | End: 2024-07-01

## 2024-07-01 RX ORDER — PANTOPRAZOLE SODIUM 40 MG/1
40 TABLET, DELAYED RELEASE ORAL
Status: DISCONTINUED | OUTPATIENT
Start: 2024-07-02 | End: 2024-07-03 | Stop reason: HOSPADM

## 2024-07-01 RX ORDER — PROPOFOL 10 MG/ML
INJECTION, EMULSION INTRAVENOUS PRN
Status: DISCONTINUED | OUTPATIENT
Start: 2024-07-01 | End: 2024-07-01 | Stop reason: SDUPTHER

## 2024-07-01 RX ORDER — PEG-3350, SODIUM SULFATE, SODIUM CHLORIDE, POTASSIUM CHLORIDE, SODIUM ASCORBATE AND ASCORBIC ACID 7.5-2.691G
100 KIT ORAL ONCE
Status: COMPLETED | OUTPATIENT
Start: 2024-07-01 | End: 2024-07-01

## 2024-07-01 RX ORDER — SODIUM CHLORIDE 9 MG/ML
INJECTION, SOLUTION INTRAVENOUS CONTINUOUS PRN
Status: DISCONTINUED | OUTPATIENT
Start: 2024-07-01 | End: 2024-07-01 | Stop reason: SDUPTHER

## 2024-07-01 RX ORDER — DEXTROSE MONOHYDRATE 25 G/50ML
12.5 INJECTION, SOLUTION INTRAVENOUS ONCE
Status: COMPLETED | OUTPATIENT
Start: 2024-07-01 | End: 2024-07-01

## 2024-07-01 RX ADMIN — SODIUM CHLORIDE: 9 INJECTION, SOLUTION INTRAVENOUS at 14:47

## 2024-07-01 RX ADMIN — INSULIN GLARGINE 6 UNITS: 100 INJECTION, SOLUTION SUBCUTANEOUS at 21:05

## 2024-07-01 RX ADMIN — HYDRALAZINE HYDROCHLORIDE 25 MG: 25 TABLET ORAL at 21:04

## 2024-07-01 RX ADMIN — RANOLAZINE 1000 MG: 500 TABLET, EXTENDED RELEASE ORAL at 21:04

## 2024-07-01 RX ADMIN — INSULIN GLARGINE 6 UNITS: 100 INJECTION, SOLUTION SUBCUTANEOUS at 09:09

## 2024-07-01 RX ADMIN — PROPOFOL 150 MCG/KG/MIN: 10 INJECTION, EMULSION INTRAVENOUS at 14:52

## 2024-07-01 RX ADMIN — QUETIAPINE FUMARATE 50 MG: 25 TABLET ORAL at 21:05

## 2024-07-01 RX ADMIN — TICAGRELOR 90 MG: 90 TABLET ORAL at 09:06

## 2024-07-01 RX ADMIN — LIDOCAINE HYDROCHLORIDE 50 MG: 20 INJECTION, SOLUTION INFILTRATION; PERINEURAL at 14:51

## 2024-07-01 RX ADMIN — LEVETIRACETAM 500 MG: 500 TABLET, FILM COATED ORAL at 21:04

## 2024-07-01 RX ADMIN — ISOSORBIDE MONONITRATE 60 MG: 60 TABLET, EXTENDED RELEASE ORAL at 09:06

## 2024-07-01 RX ADMIN — SODIUM CHLORIDE, POTASSIUM CHLORIDE, SODIUM LACTATE AND CALCIUM CHLORIDE: 600; 310; 30; 20 INJECTION, SOLUTION INTRAVENOUS at 05:19

## 2024-07-01 RX ADMIN — PANTOPRAZOLE SODIUM 40 MG: 40 TABLET, DELAYED RELEASE ORAL at 05:22

## 2024-07-01 RX ADMIN — PROPOFOL 50 MG: 10 INJECTION, EMULSION INTRAVENOUS at 14:51

## 2024-07-01 RX ADMIN — PEG-3350, SODIUM SULFATE, SODIUM CHLORIDE, POTASSIUM CHLORIDE, SODIUM ASCORBATE AND ASCORBIC ACID 100 G: KIT at 18:23

## 2024-07-01 RX ADMIN — ACETAMINOPHEN 650 MG: 325 TABLET ORAL at 18:21

## 2024-07-01 RX ADMIN — SODIUM CHLORIDE 8 MG/HR: 9 INJECTION, SOLUTION INTRAVENOUS at 11:00

## 2024-07-01 RX ADMIN — DULOXETINE HYDROCHLORIDE 60 MG: 60 CAPSULE, DELAYED RELEASE ORAL at 09:07

## 2024-07-01 RX ADMIN — ONDANSETRON 4 MG: 2 INJECTION INTRAMUSCULAR; INTRAVENOUS at 21:30

## 2024-07-01 RX ADMIN — ACETAMINOPHEN 650 MG: 325 TABLET ORAL at 09:14

## 2024-07-01 RX ADMIN — ONDANSETRON 4 MG: 2 INJECTION INTRAMUSCULAR; INTRAVENOUS at 12:16

## 2024-07-01 RX ADMIN — RANOLAZINE 1000 MG: 500 TABLET, EXTENDED RELEASE ORAL at 09:07

## 2024-07-01 RX ADMIN — AMLODIPINE BESYLATE 5 MG: 5 TABLET ORAL at 09:06

## 2024-07-01 RX ADMIN — POTASSIUM CHLORIDE 40 MEQ: 1500 TABLET, EXTENDED RELEASE ORAL at 18:21

## 2024-07-01 RX ADMIN — HYDRALAZINE HYDROCHLORIDE 25 MG: 25 TABLET ORAL at 09:06

## 2024-07-01 RX ADMIN — SODIUM CHLORIDE, PRESERVATIVE FREE 10 ML: 5 INJECTION INTRAVENOUS at 21:05

## 2024-07-01 RX ADMIN — LINACLOTIDE 290 MCG: 145 CAPSULE, GELATIN COATED ORAL at 06:45

## 2024-07-01 RX ADMIN — PREDNISONE 10 MG: 10 TABLET ORAL at 09:06

## 2024-07-01 RX ADMIN — ASPIRIN 81 MG 81 MG: 81 TABLET ORAL at 09:06

## 2024-07-01 RX ADMIN — METOPROLOL TARTRATE 25 MG: 25 TABLET, FILM COATED ORAL at 21:04

## 2024-07-01 RX ADMIN — HYDRALAZINE HYDROCHLORIDE 25 MG: 25 TABLET ORAL at 16:29

## 2024-07-01 RX ADMIN — MONTELUKAST SODIUM 10 MG: 10 TABLET, FILM COATED ORAL at 21:05

## 2024-07-01 RX ADMIN — AMITRIPTYLINE HYDROCHLORIDE 40 MG: 10 TABLET, FILM COATED ORAL at 21:05

## 2024-07-01 RX ADMIN — ATORVASTATIN CALCIUM 80 MG: 80 TABLET, FILM COATED ORAL at 21:05

## 2024-07-01 RX ADMIN — POTASSIUM CHLORIDE 40 MEQ: 1500 TABLET, EXTENDED RELEASE ORAL at 09:07

## 2024-07-01 RX ADMIN — ISOSORBIDE MONONITRATE 60 MG: 60 TABLET, EXTENDED RELEASE ORAL at 21:05

## 2024-07-01 RX ADMIN — LEVETIRACETAM 500 MG: 500 TABLET, FILM COATED ORAL at 09:07

## 2024-07-01 RX ADMIN — DEXTROSE MONOHYDRATE 12.5 G: 25 INJECTION, SOLUTION INTRAVENOUS at 15:30

## 2024-07-01 RX ADMIN — SODIUM CHLORIDE, PRESERVATIVE FREE 10 ML: 5 INJECTION INTRAVENOUS at 09:09

## 2024-07-01 RX ADMIN — PANTOPRAZOLE SODIUM 80 MG: 40 INJECTION, POWDER, FOR SOLUTION INTRAVENOUS at 09:14

## 2024-07-01 ASSESSMENT — ENCOUNTER SYMPTOMS: SHORTNESS OF BREATH: 0

## 2024-07-01 ASSESSMENT — PAIN - FUNCTIONAL ASSESSMENT
PAIN_FUNCTIONAL_ASSESSMENT: NONE - DENIES PAIN
PAIN_FUNCTIONAL_ASSESSMENT: 0-10
PAIN_FUNCTIONAL_ASSESSMENT: NONE - DENIES PAIN
PAIN_FUNCTIONAL_ASSESSMENT: NONE - DENIES PAIN

## 2024-07-01 ASSESSMENT — PAIN SCALES - GENERAL
PAINLEVEL_OUTOF10: 7
PAINLEVEL_OUTOF10: 8
PAINLEVEL_OUTOF10: 7
PAINLEVEL_OUTOF10: 0
PAINLEVEL_OUTOF10: 0
PAINLEVEL_OUTOF10: 7

## 2024-07-01 ASSESSMENT — PAIN DESCRIPTION - LOCATION
LOCATION: HEAD
LOCATION: HEAD;ABDOMEN
LOCATION: HEAD
LOCATION: HEAD

## 2024-07-01 ASSESSMENT — PAIN DESCRIPTION - DESCRIPTORS
DESCRIPTORS: ACHING

## 2024-07-01 ASSESSMENT — PAIN DESCRIPTION - ORIENTATION
ORIENTATION: MID

## 2024-07-01 ASSESSMENT — PAIN SCALES - WONG BAKER: WONGBAKER_NUMERICALRESPONSE: NO HURT

## 2024-07-01 ASSESSMENT — PAIN DESCRIPTION - PAIN TYPE: TYPE: ACUTE PAIN

## 2024-07-01 NOTE — PROGRESS NOTES
Upon assessment, stool was black and tarry. Hemoglobin trending down. Pt also peed in sample, so it could not be sent to the lab. APRN notified. Ordered occult stool sample and midnight H&H.   Hat placed in bedside commode and call light within patient's reach.   Midnight Hgb down to 8.1 from 8.7 this AM. APRN notified, GI Consult placed and NPO orders. Still attempting to collect uncontaminated stool sample. Call light within patient's reach.    Jocelyne Dean RN

## 2024-07-01 NOTE — PLAN OF CARE
Problem: Discharge Planning  Goal: Discharge to home or other facility with appropriate resources  Outcome: Progressing     Problem: Safety - Adult  Goal: Free from fall injury  6/30/2024 2009 by Jocelyne Dean RN  Outcome: Progressing     Problem: Pain  Goal: Verbalizes/displays adequate comfort level or baseline comfort level  Outcome: Progressing     Problem: Chronic Conditions and Co-morbidities  Goal: Patient's chronic conditions and co-morbidity symptoms are monitored and maintained or improved  Outcome: Progressing

## 2024-07-01 NOTE — PROGRESS NOTES
[facility]   Nephrology progress Note  [phone]  [phone]   [place]           SUMMARY :  We are following this patient for KOLBY  68-year-old female with past medical history of T2DM, HTN, CKD, atrial fibrillation, CAD s/p PCI, CHF, CVA, COPD, fibromyalgia, presented with chest pain.  For chronic kidney disease she is follows up with my partner Dr. Holden Chung , her baseline creatinine is 1.5.  At home she takes ARB losartan and Finerenone     SUBJECTIVE:   Patient progress reviewed. The patient has nausea     Physical Exam:    VITALS:  BP (!) 190/74   Pulse 81   Temp 98.3 °F (36.8 °C) (Temporal)   Resp 16   Ht 1.524 m (5')   Wt 50.6 kg (111 lb 8 oz)   SpO2 100%   BMI 21.78 kg/m²   BLOOD PRESSURE RANGE:  Systolic (24hrs), Av , Min:123 , Max:190   ; Diastolic (24hrs), Av, Min:52, Max:76    24HR INTAKE/OUTPUT:    Intake/Output Summary (Last 24 hours) at 2024 1103  Last data filed at 2024 0847  Gross per 24 hour   Intake 240 ml   Output 250 ml   Net -10 ml       Gen:  alert, oriented x 3  PERRL , EOM +  Pallor +, No icterus  JVP not raised   CV: RRR no murmur or rub .  Lungs:B/ L air entry, Normal breath sounds   Abd: soft, bowel sounds + , No organomegaly   Ext: No edema, no cyanosis  Skin: Warm.  No rash  Neuro: nonfocal.      DATA:    CBC with Differential:    Lab Results   Component Value Date/Time    WBC 10.5 2024 04:41 AM    RBC 2.85 2024 04:41 AM    HGB 8.5 2024 04:41 AM    HCT 26.0 2024 04:41 AM     2024 04:41 AM    MCV 91.2 2024 04:41 AM    MCH 29.8 2024 04:41 AM    MCHC 32.7 2024 04:41 AM    RDW 15.8 2024 04:41 AM    BANDSPCT 1.0 07/15/2011 12:29 PM    LYMPHOPCT 6.4 2024 04:41 AM    MONOPCT 4.2 2024 04:41 AM    EOSPCT 0.1 2024 04:41 AM    BASOPCT 0.2 2024 04:41 AM    MONOSABS 0.4 2024 04:41 AM    EOSABS 0.0 2024 04:41 AM    BASOSABS 0.0 2024 04:41  AM    DIFFTYPE Auto 05/23/2013 02:56 PM     CMP:    Lab Results   Component Value Date/Time     07/01/2024 04:41 AM    K 3.3 07/01/2024 04:41 AM    K 5.0 06/28/2024 04:30 AM     07/01/2024 04:41 AM    CO2 16 07/01/2024 04:41 AM    BUN 30 07/01/2024 04:41 AM    CREATININE 2.2 07/01/2024 04:41 AM    GFRAA 46 10/09/2022 06:18 AM    GFRAA 60 05/23/2013 02:56 PM    AGRATIO 0.9 06/27/2024 08:40 PM    LABGLOM 24 07/01/2024 04:41 AM    LABGLOM 49 04/22/2024 04:43 AM    GLUCOSE 71 07/01/2024 04:41 AM    PROT 8.1 02/15/2013 04:53 PM    CALCIUM 8.8 07/01/2024 04:41 AM    BILITOT <0.2 06/27/2024 08:40 PM    ALKPHOS 145 06/27/2024 08:40 PM    AST 19 06/27/2024 08:40 PM    ALT 11 06/27/2024 08:40 PM     Phosphorus:    Lab Results   Component Value Date/Time    PHOS 2.6 04/05/2024 07:13 AM     Last 3 Troponin:    Lab Results   Component Value Date/Time    TROPONINI <0.01 04/07/2023 08:24 PM    TROPONINI 0.03 02/17/2023 07:55 PM    TROPONINI 0.03 02/17/2023 05:38 PM     U/A:    Lab Results   Component Value Date/Time    COLORU Yellow 06/28/2024 04:40 PM    PROTEINU 100 06/28/2024 04:40 PM    PHUR 5.0 06/28/2024 04:40 PM    PHUR 5.5 04/05/2024 11:35 AM    LABCAST 20-40 Hyaline 07/06/2017 10:42 PM    WBCUA 4 06/28/2024 04:40 PM    RBCUA 0 06/28/2024 04:40 PM    MUCUS 1+ 05/23/2013 01:27 PM    YEAST Rare Yeast 05/14/2012 03:29 PM    BACTERIA 2+ 06/28/2024 04:40 PM    CLARITYU Clear 06/28/2024 04:40 PM    LEUKOCYTESUR Negative 06/28/2024 04:40 PM    UROBILINOGEN 1.0 06/28/2024 04:40 PM    BILIRUBINUR Negative 06/28/2024 04:40 PM    BLOODU Negative 06/28/2024 04:40 PM    GLUCOSEU Negative 06/28/2024 04:40 PM    GLUCOSEU NEGATIVE 05/14/2012 03:29 PM    AMORPHOUS Rare 12/11/2014 11:11 AM         IMPRESSION/RECOMMENDATIONS:      Diagnosis and Plan     KOLBY  Cr 2.2     CKD  stage IIIb  Proteinuria 100 mg/dl     Hypertension  High , adjust medications   T2DM  Chest pain  Being investigated     Navid Monsalve MD

## 2024-07-01 NOTE — ANESTHESIA POSTPROCEDURE EVALUATION
Department of Anesthesiology  Postprocedure Note    Patient: Maren Meza  MRN: [id number]  YOB: 1955  Date of evaluation: 7/1/2024    Procedure Summary       Date: 07/01/24 Room / Location: [place] [address] / [facility]    Anesthesia Start: 1447 Anesthesia Stop: 1508    Procedure: ESOPHAGOGASTRODUODENOSCOPY BIOPSY (Abdomen) Diagnosis:       Melena      (Melena [K92.1])    Surgeons: Marcio Castillo MD Responsible Provider: Ashely Harvey MD    Anesthesia Type: MAC ASA Status: 3            Anesthesia Type: No value filed.    Tessa Phase I: Tessa Score: 10    Tessa Phase II:      Anesthesia Post Evaluation    Patient location during evaluation: PACU  Level of consciousness: awake  Airway patency: patent  Cardiovascular status: hemodynamically stable  Respiratory status: acceptable  There was medical reason for not using a multimodal analgesia pain management approach.    No notable events documented.

## 2024-07-01 NOTE — CONSULTS
Gastroenterology Consult Note        Patient: Maren Meza  : 1955  Acct#:      Date:  2024      1. Chest pain, unspecified type        Subjective:       History of Present Illness  Patient is a 68 y.o.  female admitted with Chest pain [R07.9]  Chest pain, unspecified type [R07.9] who is seen in consult for anemia.  History of CAD with prior PCI on aspirin and Brilinta, ischemic cardiomyopathy, A-fib, CKD, COPD, diabetes.    She came to the ER 4 days ago with chest pain, nausea, vomiting, diarrhea, abdominal pain.  The chest pain was in the mid to left chest with radiation in the left arm.  Was evaluated by cardiology and it was noted that she had a recent angiography without obstructive disease and therefore no further evaluation.  She reports having nausea and dark emesis at home.  No red blood with vomiting.  Was having green diarrhea 3-5 times a day and later the stool was black.  No red blood with stools.  She is on p.o. iron at home but this does not make her stools look black.  Also would have left-sided abdominal pain.  Has had the symptoms off and on over the past couple weeks.  Today her stool was starting to thicken up.  Denies NSAIDs other than aspirin.    Past Medical History:   Diagnosis Date    Acid reflux     Anemia     Anxiety and depression     Arthritis     Asthma     Atrial fibrillation (HCC)     CAD (coronary artery disease) 12/3/2012    Cerebral artery occlusion with cerebral infarction ([facility])     TIA\"\"S--right sided weakness & headache    CHF (congestive heart failure) ([facility])     Chronic kidney disease--stage III     40% kidney function    COPD (chronic obstructive pulmonary disease) ([facility])     DM2 (diabetes mellitus, type 2) ([facility])     Dysarthria     Fibromyalgia 2016    Headache(784.0) 2013    Hemisensory loss     History of blood transfusion 2020    pt denies having transfusion reaction    Hyperlipidemia     Hypertension     IBS  rigidity. Bowel sounds normal. No masses,  no organomegaly.   Extremities: atraumatic, no cyanosis or edema  Skin: warm and dry, no jaundice  Neuro: Grossly intact, A&OX3  Musculoskeletal: 5/5  strength BUE      Data Review:    Recent Labs     06/29/24  0452 06/30/24  0442 07/01/24  0004 07/01/24 0441   WBC 6.0 4.8  --  10.5   HGB 9.1* 8.7* 8.1* 8.5*   HCT 27.6* 26.4* 25.2* 26.0*   MCV 91.2 91.1  --  91.2    231  --  220     Recent Labs     06/29/24  0452 06/30/24  0442 07/01/24 0441    140 139   K 3.7 3.9 3.3*    111* 111*   CO2 19* 16* 16*   BUN 40* 39* 30*   CREATININE 2.0* 2.4* 2.2*     No results for input(s): \"AST\", \"ALT\", \"BILIDIR\", \"BILITOT\", \"ALKPHOS\" in the last 72 hours.    Invalid input(s): \"ALB\"  No results for input(s): \"LIPASE\", \"AMYLASE\" in the last 72 hours.  No results for input(s): \"PROTIME\", \"INR\" in the last 72 hours.  Invalid input(s): \"PTT\"  No results for input(s): \"OCCULTBLD\" in the last 72 hours.    Imaging Studies:               CT-scan of abdomen and pelvis wo contrast 6/29/24    IMPRESSION:  1.  Evidence of nonspecific enteritis.  No acute obstruction.     2.  No hydronephrosis or nephrolithiasis.     3.  Stable appearing coarse calcification within the IVC possibly related to  chronic DVT.     4.  Additional findings, as above.               Assessment/Plan:     Melena, coffee-ground emesis -had coffee-ground emesis at home and then black stools here.  Does have history of possible GAVE.   -N.p.o.  -PPI IV  -EGD today  - decide on colonoscopy pending EGD findings.     Left-sided abdominal pain, nausea, vomiting, diarrhea -CT at admission with multiple air-fluid levels in the small bowel and colon concerning for nonspecific enteritis.  C. difficile was negative.  -Check GI bacterial pathogens    Acute on chronic anemia -hemoglobin ranged from 9-10 last year.  Hemoglobin drifted down this year to 8.5.  -Monitor hemoglobin    CAD -on aspirin and

## 2024-07-01 NOTE — PROGRESS NOTES
Pt awake in bed watching tv. VSS, assessment complete and medications given. Denies any needs. Call light in reach and bed alarm engaged.

## 2024-07-01 NOTE — H&P
Gastroenterology Note             Pre-operative History and Physical    Patient: Maren Meza  : 1955  CSN:     History Obtained From:  patient and/or guardian.     HISTORY OF PRESENT ILLNESS:    The patient is a 68 y.o. female  here for EGD  This very pleasant 68-year-old female comes in because she is having black tarry stools and she is nauseated and she is having coffee-ground emesis and she is anemic so redoing an upper endoscopy she is on anticoagulation    Past Medical History:    Past Medical History:   Diagnosis Date    Acid reflux     Anemia     Anxiety and depression     Arthritis     Asthma     Atrial fibrillation (HCC)     CAD (coronary artery disease) 12/3/2012    Cerebral artery occlusion with cerebral infarction (HCC)     TIA\"\"S--right sided weakness & headache    CHF (congestive heart failure) (HCC)     Chronic kidney disease--stage III     40% kidney function    COPD (chronic obstructive pulmonary disease) (HCC)     DM2 (diabetes mellitus, type 2) (HCC)     Dysarthria     Fibromyalgia 2016    Headache(784.0) 2013    Hemisensory loss     History of blood transfusion 2020    pt denies having transfusion reaction    Hyperlipidemia     Hypertension     IBS (irritable bowel syndrome)     Inferior vena cava occlusion (HCC)     Keratitis     Meningioma (HCC)     MI, old     Neuropathy     Superior vena cava obstruction     Temporal arteritis (HCC) 2014    Wears glasses      Past Surgical History:    Past Surgical History:   Procedure Laterality Date    ABLATION OF DYSRHYTHMIC FOCUS    and 2020    times 2    ARTERY BIOPSY Right 2014    RIGHT TEMPORAL ARTERY BIOPSY    CAROTID ARTERY SURGERY Left     clean up per pt    CATARACT REMOVAL Bilateral     CHOLECYSTECTOMY      COLONOSCOPY N/A 2021    COLONOSCOPY WITH BIOPSY performed by Gera Matthews MD at [facility] ENDOSCOPY    COLONOSCOPY N/A 10/15/2021    COLONOSCOPY performed by Gera Matthews MD at [facility]  1 tablet by mouth 3 times daily (with meals) (Patient taking differently: Take 1 tablet by mouth 3 times daily as needed (Low SBP.)) 90 tablet 3    isosorbide mononitrate (IMDUR) 60 MG extended release tablet Take 1 tablet by mouth in the morning and at bedtime      Insulin Aspart (NOVOLOG FLEXPEN SC) Inject 4 Units into the skin 3 times daily (with meals)      ticagrelor (BRILINTA) 90 MG TABS tablet Take 1 tablet by mouth 2 times daily      Calcium Carb-Cholecalciferol (OYSTER SHELL CALCIUM W/D) 500-5 MG-MCG TABS tablet Take 1 tablet by mouth daily      cyclobenzaprine (FLEXERIL) 5 MG tablet Take 1 tablet by mouth 3 times daily as needed      NURTEC 75 MG TBDP Take 1 tablet by mouth once as needed (Migraine.)      traMADol (ULTRAM) 50 MG tablet Take 1 tablet by mouth every 6 hours as needed for Pain.      aspirin (ASPIRIN CHILDRENS) 81 MG chewable tablet Take 1 tablet by mouth daily 30 tablet 3    ranolazine (RANEXA) 1000 MG extended release tablet TAKE 1 TABLET BY MOUTH 2 TIMES DAILY 180 tablet 1    QUEtiapine (SEROQUEL) 25 MG tablet TAKE 1-2 TABLETS BY MOUTH NIGHTLY (Patient not taking: Reported on 6/27/2024) 60 tablet 3    fluticasone (FLONASE) 50 MCG/ACT nasal spray 1 spray by Each Nostril route daily 16 g 0    ondansetron (ZOFRAN-ODT) 4 MG disintegrating tablet Take 1 tablet by mouth 3 times daily as needed for Nausea or Vomiting 30 tablet 1    albuterol (PROVENTIL) (2.5 MG/3ML) 0.083% nebulizer solution TAKE 3 MLS BY NEBULIZATION EVERY 4 HOURS AS NEEDED FOR WHEEZING (Patient taking differently: Take 3 mLs by nebulization every 4 hours as needed for Wheezing) 360 mL 5    albuterol sulfate HFA (PROVENTIL;VENTOLIN;PROAIR) 108 (90 Base) MCG/ACT inhaler Inhale 2 puffs into the lungs every 4 hours as needed for Wheezing or Shortness of Breath      linaclotide (LINZESS) 290 MCG CAPS capsule Take 1 capsule by mouth every morning (before breakfast)      hydrOXYzine HCl (ATARAX) 25 MG tablet Take 1 tablet by mouth 3

## 2024-07-01 NOTE — PROCEDURES
Endoscopy Note    Patient: Maren Meza  : 1955  CSN: [id number]    Procedure: Esophagogastroduodenoscopy with biopsy    Date:  2024     Surgeon:  Marcio Castillo MD     Referring Physician:  Marin Cardenas MD    Preoperative Diagnosis:  Melena [K92.1]    Postoperative Diagnosis: #1 hiatal hernia #2 patient has proximal esophageal veins which are dilated these are possible varices  #3 gastritis in which we have biopsy  #4 mild duodenitis    Anesthesia:  MAC    EBL: minimal to none.    Indications: This is a 68 y.o. year old female who comes in today because she has been having melena and vomiting up coffee-ground emesis she is also anemic.    Description of Procedure:  Informed consent was obtained from the patient after explanation of indications, benefits and possible risks and complications of the procedure.  The patient was then taken to the endoscopy suite, placed in the left lateral decubitus position and the above IV sedation was administrered.    The Olympus video endoscope was passed through the hypopharynx     Posterior pharynx was normal    Esophagus in the proximal esophagus the patient did have some dilated veins these may be proximal esophageal varices but we these did not traced all the way down to the distal esophagus so we I was not quite sure if these were all in the proximal esophagus since they were not going to the distal    Hiatal hernia is 2 cm    Stomach there is no evidence of blood in the.  Stomach but there is some inflammation we did take several biopsies for H. Pylori    Duodenum there is some mild inflammation of the duodenal bulb but there is no evidence of a severe duodenitis or ulcers or ulcerations and there is no blood in the duodenum    Retroflexion showed the hiatal hernia        Gastric or Duodenal ulcer present: No    The patient tolerated the procedure well and was taken to the post anesthesia care unit in good condition.  There were no immediate

## 2024-07-01 NOTE — PROGRESS NOTES
[facility]    Hospitalist Progress Note:      Name:  Maren Meza /Age/Sex: 1955  (68 y.o. female)   MRN & CSN:  [id number] & [id number] Encounter Date: 2024    Location:  [id number]-2478/5902-01 PCP: Marin Cardenas MD     Attending:Saqib Andrade MD  Admission Date: 2024      Hospital Day: 5    Assessment:  Maren Meza is a 68 y.o. female with medical history including CAD, PCI, HTN, ischemic cardiomyopathy, DM 2, hyperlipidemia and brain tumor presented to ED for evaluation of chest pain.  Chest pain, intermittent, unclear etio:mild troponin elevation. F/b cardiology team, recent ischemic evaluation (angiography) without obstructive disease, no further ischemic w/u recommended  Occult positive.  S/P EGD on   #1 possible proximal esophageal varices  #2 hiatal hernia #3 gastritis #4 mild duodenitis  KOLBY on CKD 3, prerenal: S.creat 1.8--> 2.4--> 2.2  CAD: Hx. MVPCI, mild nonobstructive ISR by 3/2024 [facility]  Ischemic cardiomyopathy, LVEF 50%  Hypertension: mild acceleration   Hyperlipidemia   Diabetes mellitus: BS stable  Ch Headache d/t Brain tumor/meningioma - on prednisone 10 mg daily    Plan:   Hold ASA and Hep SQ  SCD  Check H/H q6h, transfuse PRBC PRN  EGD today  Colonoscopy tomorrow  Cont hold losartan and finerenone.  Protonix IV to PO  Follow H/H  Change to inpatient status  Diet Diet NPO Exceptions are: Sips of Water with Meds, Other (Specify); Specify Other Exceptions: bowel prep if hasn't finished  ADULT DIET; Clear Liquid   DVT Prophylaxis [] Lovenox, []  Heparin, [x] SCDs, [] Ambulation,  [] Eliquis, [] Xarelto  [] Coumadin   Code Status Full Code   Disposition Expected Disposition: Home   Estimated Date of Discharge:  1-2 days  Reason for continued admission: Occult positive     Discussed with patient, nursing and CM.    Change to inpatient status.  Will see what EGD and colonoscpy show.    Subjective:  Patient with abdominal pain.  No CP, SOB, HA or fevers.  Ready

## 2024-07-01 NOTE — ANESTHESIA PRE PROCEDURE
Type & Screen (If Applicable):  No results found for: \"LABABO\"    Drug/Infectious Status (If Applicable):  No results found for: \"HIV\", \"HEPCAB\"    COVID-19 Screening (If Applicable):   Lab Results   Component Value Date/Time    COVID19 Not Detected 03/27/2024 12:05 AM           Anesthesia Evaluation  Patient summary reviewed and Nursing notes reviewed   no history of anesthetic complications:   Airway: Mallampati: II  TM distance: >3 FB   Neck ROM: full  Mouth opening: > = 3 FB   Dental: normal exam         Pulmonary:normal exam    (+)  COPD:          asthma:     (-) shortness of breath                           Cardiovascular:    (+) hypertension:, CAD: non-obstructive, CHF: systolic and diastolic    (-) CABG/stent and dysrhythmias      Rhythm: regular  Rate: normal  Echocardiogram reviewed    Cleared by cardiology           ROS comment: TTE 2024:    Conclusions      Summary   Normal left ventricle size, wall thickness and systolic function with an   estimated ejection fraction of 55-60%.   Grade II diastolic dysfunction with elevated LV filling pressures.   Normal right ventricular size and function.   The left atrium is moderately dilated.        [facility] 2024:    HEMODYNAMICS:  Aortic pressure was 162/86;  LVEDP 5.  There was no gradient between the left ventricle and aorta.       ANGIOGRAM/CORONARY ARTERIOGRAM:        The left main coronary artery is very short without significant disease.     The left anterior descending artery has patent previously placed stent with mild mid vessel 20% ISR.     The left circumflex artery is non-dominant with small AV groove course with minor luminal irregularities.              OM1 has moderate ISR 40% mid vessel              OM2 has mild ISR 30% proximal and mid vessel     The right coronary artery is dominant vessel with widely patent stents.              RPDA has mild diffuse disease       Neuro/Psych:   (+) headaches:, psychiatric history:depression/anxiety    (-)

## 2024-07-01 NOTE — PROGRESS NOTES
Pt back from EGD. Assisted to the bathroom and returned to bed with alarm engaged. VSS and pt denies pain or any other needs. Call light in reach and bed alarm engaged.

## 2024-07-01 NOTE — CARE COORDINATION
07/01/24 1652   IMM Letter   IMM Letter given to Patient/Family/Significant other/Guardian/POA/by: IMM Letter given to patient by CM   IMM Letter date given: 07/01/24   IMM Letter time given: 1650

## 2024-07-01 NOTE — PLAN OF CARE
Problem: Safety - Adult  Goal: Free from fall injury  Outcome: Progressing   Pt will remain free from falls. Fall precautions in place and alarm engaged. Bedside table and call light within reach. Pt aware to use call light for assistance ambulating.    Problem: Pain  Goal: Verbalizes/displays adequate comfort level or baseline comfort level  Outcome: Progressing   Pt can rate pain on a 0-10 scale. Pt pain will remain at a level that is tolerable to pt. PRN pain medication as needed.     Problem: Chronic Conditions and Co-morbidities  Goal: Patient's chronic conditions and co-morbidity symptoms are monitored and maintained or improved  Outcome: Progressing  Flowsheets (Taken 7/1/2024 5577)  Care Plan - Patient's Chronic Conditions and Co-Morbidity Symptoms are Monitored and Maintained or Improved: Monitor and assess patient's chronic conditions and comorbid symptoms for stability, deterioration, or improvement

## 2024-07-02 ENCOUNTER — ANESTHESIA EVENT (OUTPATIENT)
Dept: ENDOSCOPY | Age: 69
DRG: 378 | End: 2024-07-02
Payer: COMMERCIAL

## 2024-07-02 ENCOUNTER — ANESTHESIA (OUTPATIENT)
Dept: ENDOSCOPY | Age: 69
DRG: 378 | End: 2024-07-02
Payer: COMMERCIAL

## 2024-07-02 LAB
ANION GAP SERPL CALCULATED.3IONS-SCNC: 14 MMOL/L (ref 3–16)
BASOPHILS # BLD: 0 K/UL (ref 0–0.2)
BASOPHILS NFR BLD: 0.2 %
BUN SERPL-MCNC: 24 MG/DL (ref 7–20)
CALCIUM SERPL-MCNC: 9.2 MG/DL (ref 8.3–10.6)
CHLORIDE SERPL-SCNC: 115 MMOL/L (ref 99–110)
CO2 SERPL-SCNC: 14 MMOL/L (ref 21–32)
CREAT SERPL-MCNC: 2 MG/DL (ref 0.6–1.2)
DEPRECATED RDW RBC AUTO: 16.4 % (ref 12.4–15.4)
EOSINOPHIL # BLD: 0 K/UL (ref 0–0.6)
EOSINOPHIL NFR BLD: 0.3 %
GFR SERPLBLD CREATININE-BSD FMLA CKD-EPI: 27 ML/MIN/{1.73_M2}
GI PATHOGENS PNL STL NAA+PROBE: NORMAL
GLUCOSE BLD-MCNC: 101 MG/DL (ref 70–99)
GLUCOSE BLD-MCNC: 109 MG/DL (ref 70–99)
GLUCOSE BLD-MCNC: 131 MG/DL (ref 70–99)
GLUCOSE BLD-MCNC: 169 MG/DL (ref 70–99)
GLUCOSE BLD-MCNC: 183 MG/DL (ref 70–99)
GLUCOSE BLD-MCNC: 66 MG/DL (ref 70–99)
GLUCOSE BLD-MCNC: 78 MG/DL (ref 70–99)
GLUCOSE BLD-MCNC: 90 MG/DL (ref 70–99)
GLUCOSE SERPL-MCNC: 78 MG/DL (ref 70–99)
HCT VFR BLD AUTO: 27.5 % (ref 36–48)
HCT VFR BLD AUTO: 30.5 % (ref 36–48)
HGB BLD-MCNC: 8.9 G/DL (ref 12–16)
HGB BLD-MCNC: 9.5 G/DL (ref 12–16)
LYMPHOCYTES # BLD: 0.8 K/UL (ref 1–5.1)
LYMPHOCYTES NFR BLD: 13.6 %
MCH RBC QN AUTO: 29.5 PG (ref 26–34)
MCHC RBC AUTO-ENTMCNC: 31.2 G/DL (ref 31–36)
MCV RBC AUTO: 94.6 FL (ref 80–100)
MONOCYTES # BLD: 0.4 K/UL (ref 0–1.3)
MONOCYTES NFR BLD: 6.7 %
NEUTROPHILS # BLD: 4.6 K/UL (ref 1.7–7.7)
NEUTROPHILS NFR BLD: 79.2 %
PERFORMED ON: ABNORMAL
PERFORMED ON: NORMAL
PERFORMED ON: NORMAL
PLATELET # BLD AUTO: 241 K/UL (ref 135–450)
PMV BLD AUTO: 7.7 FL (ref 5–10.5)
POTASSIUM SERPL-SCNC: 3.7 MMOL/L (ref 3.5–5.1)
RBC # BLD AUTO: 3.22 M/UL (ref 4–5.2)
SODIUM SERPL-SCNC: 143 MMOL/L (ref 136–145)
WBC # BLD AUTO: 5.8 K/UL (ref 4–11)

## 2024-07-02 PROCEDURE — 6370000000 HC RX 637 (ALT 250 FOR IP): Performed by: HOSPITALIST

## 2024-07-02 PROCEDURE — 7100000000 HC PACU RECOVERY - FIRST 15 MIN: Performed by: INTERNAL MEDICINE

## 2024-07-02 PROCEDURE — 80048 BASIC METABOLIC PNL TOTAL CA: CPT

## 2024-07-02 PROCEDURE — 94640 AIRWAY INHALATION TREATMENT: CPT

## 2024-07-02 PROCEDURE — 85018 HEMOGLOBIN: CPT

## 2024-07-02 PROCEDURE — 6370000000 HC RX 637 (ALT 250 FOR IP): Performed by: INTERNAL MEDICINE

## 2024-07-02 PROCEDURE — 2709999900 HC NON-CHARGEABLE SUPPLY: Performed by: INTERNAL MEDICINE

## 2024-07-02 PROCEDURE — 36415 COLL VENOUS BLD VENIPUNCTURE: CPT

## 2024-07-02 PROCEDURE — 2500000003 HC RX 250 WO HCPCS: Performed by: STUDENT IN AN ORGANIZED HEALTH CARE EDUCATION/TRAINING PROGRAM

## 2024-07-02 PROCEDURE — 1200000000 HC SEMI PRIVATE

## 2024-07-02 PROCEDURE — 7100000001 HC PACU RECOVERY - ADDTL 15 MIN: Performed by: INTERNAL MEDICINE

## 2024-07-02 PROCEDURE — 3700000000 HC ANESTHESIA ATTENDED CARE: Performed by: INTERNAL MEDICINE

## 2024-07-02 PROCEDURE — 2580000003 HC RX 258: Performed by: STUDENT IN AN ORGANIZED HEALTH CARE EDUCATION/TRAINING PROGRAM

## 2024-07-02 PROCEDURE — 0DBK8ZZ EXCISION OF ASCENDING COLON, VIA NATURAL OR ARTIFICIAL OPENING ENDOSCOPIC: ICD-10-PCS | Performed by: INTERNAL MEDICINE

## 2024-07-02 PROCEDURE — 88305 TISSUE EXAM BY PATHOLOGIST: CPT

## 2024-07-02 PROCEDURE — 2580000003 HC RX 258: Performed by: PHYSICIAN ASSISTANT

## 2024-07-02 PROCEDURE — 6370000000 HC RX 637 (ALT 250 FOR IP): Performed by: PHYSICIAN ASSISTANT

## 2024-07-02 PROCEDURE — 3609010600 HC COLONOSCOPY POLYPECTOMY SNARE/COLD BIOPSY: Performed by: INTERNAL MEDICINE

## 2024-07-02 PROCEDURE — 6360000002 HC RX W HCPCS: Performed by: NURSE ANESTHETIST, CERTIFIED REGISTERED

## 2024-07-02 PROCEDURE — 85014 HEMATOCRIT: CPT

## 2024-07-02 PROCEDURE — 94761 N-INVAS EAR/PLS OXIMETRY MLT: CPT

## 2024-07-02 PROCEDURE — 3700000001 HC ADD 15 MINUTES (ANESTHESIA): Performed by: INTERNAL MEDICINE

## 2024-07-02 PROCEDURE — 2500000003 HC RX 250 WO HCPCS: Performed by: NURSE ANESTHETIST, CERTIFIED REGISTERED

## 2024-07-02 PROCEDURE — 85025 COMPLETE CBC W/AUTO DIFF WBC: CPT

## 2024-07-02 PROCEDURE — 2500000003 HC RX 250 WO HCPCS: Performed by: ANESTHESIOLOGY

## 2024-07-02 RX ORDER — DEXTROSE MONOHYDRATE 25 G/50ML
25 INJECTION, SOLUTION INTRAVENOUS ONCE
Status: COMPLETED | OUTPATIENT
Start: 2024-07-02 | End: 2024-07-02

## 2024-07-02 RX ORDER — SODIUM CHLORIDE 9 MG/ML
INJECTION, SOLUTION INTRAVENOUS CONTINUOUS
Status: DISCONTINUED | OUTPATIENT
Start: 2024-07-02 | End: 2024-07-02 | Stop reason: HOSPADM

## 2024-07-02 RX ORDER — PROPOFOL 10 MG/ML
INJECTION, EMULSION INTRAVENOUS PRN
Status: DISCONTINUED | OUTPATIENT
Start: 2024-07-02 | End: 2024-07-02 | Stop reason: SDUPTHER

## 2024-07-02 RX ORDER — LIDOCAINE HYDROCHLORIDE 20 MG/ML
INJECTION, SOLUTION INFILTRATION; PERINEURAL PRN
Status: DISCONTINUED | OUTPATIENT
Start: 2024-07-02 | End: 2024-07-02 | Stop reason: SDUPTHER

## 2024-07-02 RX ADMIN — RANOLAZINE 1000 MG: 500 TABLET, EXTENDED RELEASE ORAL at 22:21

## 2024-07-02 RX ADMIN — HYDRALAZINE HYDROCHLORIDE 25 MG: 25 TABLET ORAL at 22:22

## 2024-07-02 RX ADMIN — SODIUM BICARBONATE: 84 INJECTION, SOLUTION INTRAVENOUS at 09:19

## 2024-07-02 RX ADMIN — HYDRALAZINE HYDROCHLORIDE 25 MG: 25 TABLET ORAL at 11:13

## 2024-07-02 RX ADMIN — LEVETIRACETAM 500 MG: 500 TABLET, FILM COATED ORAL at 11:10

## 2024-07-02 RX ADMIN — SODIUM CHLORIDE, PRESERVATIVE FREE 10 ML: 5 INJECTION INTRAVENOUS at 22:21

## 2024-07-02 RX ADMIN — SODIUM CHLORIDE: 9 INJECTION, SOLUTION INTRAVENOUS at 13:15

## 2024-07-02 RX ADMIN — Medication 2 PUFF: at 20:07

## 2024-07-02 RX ADMIN — LINACLOTIDE 290 MCG: 145 CAPSULE, GELATIN COATED ORAL at 05:49

## 2024-07-02 RX ADMIN — LIDOCAINE HYDROCHLORIDE 40 MG: 20 INJECTION, SOLUTION INFILTRATION; PERINEURAL at 13:22

## 2024-07-02 RX ADMIN — ACETAMINOPHEN 650 MG: 325 TABLET ORAL at 22:22

## 2024-07-02 RX ADMIN — POTASSIUM CHLORIDE 40 MEQ: 1500 TABLET, EXTENDED RELEASE ORAL at 16:29

## 2024-07-02 RX ADMIN — INSULIN GLARGINE 6 UNITS: 100 INJECTION, SOLUTION SUBCUTANEOUS at 22:21

## 2024-07-02 RX ADMIN — PANTOPRAZOLE SODIUM 40 MG: 40 TABLET, DELAYED RELEASE ORAL at 05:49

## 2024-07-02 RX ADMIN — PROPOFOL 20 MG: 10 INJECTION, EMULSION INTRAVENOUS at 13:33

## 2024-07-02 RX ADMIN — DULOXETINE HYDROCHLORIDE 60 MG: 60 CAPSULE, DELAYED RELEASE ORAL at 11:13

## 2024-07-02 RX ADMIN — ISOSORBIDE MONONITRATE 60 MG: 60 TABLET, EXTENDED RELEASE ORAL at 11:10

## 2024-07-02 RX ADMIN — PREDNISONE 10 MG: 10 TABLET ORAL at 11:10

## 2024-07-02 RX ADMIN — ISOSORBIDE MONONITRATE 60 MG: 60 TABLET, EXTENDED RELEASE ORAL at 22:22

## 2024-07-02 RX ADMIN — LEVETIRACETAM 500 MG: 500 TABLET, FILM COATED ORAL at 22:22

## 2024-07-02 RX ADMIN — PROPOFOL 10 MG: 10 INJECTION, EMULSION INTRAVENOUS at 13:28

## 2024-07-02 RX ADMIN — INSULIN GLARGINE 6 UNITS: 100 INJECTION, SOLUTION SUBCUTANEOUS at 11:10

## 2024-07-02 RX ADMIN — PROPOFOL 20 MG: 10 INJECTION, EMULSION INTRAVENOUS at 13:22

## 2024-07-02 RX ADMIN — MONTELUKAST SODIUM 10 MG: 10 TABLET, FILM COATED ORAL at 22:25

## 2024-07-02 RX ADMIN — ATORVASTATIN CALCIUM 80 MG: 80 TABLET, FILM COATED ORAL at 22:22

## 2024-07-02 RX ADMIN — AMITRIPTYLINE HYDROCHLORIDE 40 MG: 10 TABLET, FILM COATED ORAL at 22:23

## 2024-07-02 RX ADMIN — QUETIAPINE FUMARATE 50 MG: 25 TABLET ORAL at 22:22

## 2024-07-02 RX ADMIN — METOPROLOL TARTRATE 25 MG: 25 TABLET, FILM COATED ORAL at 11:10

## 2024-07-02 RX ADMIN — OXYCODONE 5 MG: 5 TABLET ORAL at 10:18

## 2024-07-02 RX ADMIN — PROPOFOL 75 MCG/KG/MIN: 10 INJECTION, EMULSION INTRAVENOUS at 13:23

## 2024-07-02 RX ADMIN — HYDRALAZINE HYDROCHLORIDE 25 MG: 25 TABLET ORAL at 16:29

## 2024-07-02 RX ADMIN — DEXTROSE MONOHYDRATE 25 G: 25 INJECTION, SOLUTION INTRAVENOUS at 13:04

## 2024-07-02 RX ADMIN — RANOLAZINE 1000 MG: 500 TABLET, EXTENDED RELEASE ORAL at 11:10

## 2024-07-02 RX ADMIN — METOPROLOL TARTRATE 25 MG: 25 TABLET, FILM COATED ORAL at 22:22

## 2024-07-02 RX ADMIN — AMLODIPINE BESYLATE 5 MG: 5 TABLET ORAL at 11:10

## 2024-07-02 RX ADMIN — INSULIN LISPRO 4 UNITS: 100 INJECTION, SOLUTION INTRAVENOUS; SUBCUTANEOUS at 16:50

## 2024-07-02 ASSESSMENT — PAIN SCALES - GENERAL
PAINLEVEL_OUTOF10: 6
PAINLEVEL_OUTOF10: 6
PAINLEVEL_OUTOF10: 8
PAINLEVEL_OUTOF10: 3
PAINLEVEL_OUTOF10: 6
PAINLEVEL_OUTOF10: 2

## 2024-07-02 ASSESSMENT — PAIN - FUNCTIONAL ASSESSMENT
PAIN_FUNCTIONAL_ASSESSMENT: ACTIVITIES ARE NOT PREVENTED
PAIN_FUNCTIONAL_ASSESSMENT: NONE - DENIES PAIN
PAIN_FUNCTIONAL_ASSESSMENT: ACTIVITIES ARE NOT PREVENTED

## 2024-07-02 ASSESSMENT — PAIN DESCRIPTION - PAIN TYPE
TYPE: ACUTE PAIN
TYPE: ACUTE PAIN

## 2024-07-02 ASSESSMENT — PAIN DESCRIPTION - ORIENTATION
ORIENTATION: ANTERIOR
ORIENTATION: RIGHT
ORIENTATION: RIGHT

## 2024-07-02 ASSESSMENT — PAIN DESCRIPTION - LOCATION
LOCATION: HEAD

## 2024-07-02 ASSESSMENT — PAIN DESCRIPTION - DIRECTION
RADIATING_TOWARDS: NON-RADIATING
RADIATING_TOWARDS: NON-RADIATING

## 2024-07-02 ASSESSMENT — PAIN DESCRIPTION - FREQUENCY
FREQUENCY: INTERMITTENT
FREQUENCY: CONTINUOUS

## 2024-07-02 ASSESSMENT — PAIN DESCRIPTION - DESCRIPTORS
DESCRIPTORS: ACHING

## 2024-07-02 ASSESSMENT — COPD QUESTIONNAIRES: CAT_SEVERITY: MILD

## 2024-07-02 ASSESSMENT — LIFESTYLE VARIABLES: SMOKING_STATUS: 0

## 2024-07-02 ASSESSMENT — PAIN DESCRIPTION - ONSET: ONSET: GRADUAL

## 2024-07-02 NOTE — ANESTHESIA PRE PROCEDURE
Value Date    PREGSERUM Negative 10/27/2020        ABGs:   Lab Results   Component Value Date/Time    PHART 7.367 07/28/2019 10:40 PM    PO2ART 97.4 07/28/2019 10:40 PM    KIG2NFS 29.6 07/28/2019 10:40 PM    ABU0PFM 16.6 07/28/2019 10:40 PM    BEART -7.4 07/28/2019 10:40 PM    F4NTCNLP 98.3 07/28/2019 10:40 PM        Type & Screen (If Applicable):  No results found for: \"LABABO\"    Drug/Infectious Status (If Applicable):  No results found for: \"HIV\", \"HEPCAB\"    COVID-19 Screening (If Applicable):   Lab Results   Component Value Date/Time    COVID19 Not Detected 03/27/2024 12:05 AM           Anesthesia Evaluation  Patient summary reviewed and Nursing notes reviewed   no history of anesthetic complications:   Airway: Mallampati: III  TM distance: >3 FB   Neck ROM: full  Mouth opening: > = 3 FB   Dental:    (+) edentulous      Pulmonary:normal exam  breath sounds clear to auscultation  (+)  COPD (prn inh use, no O2 req): mild,          asthma (mixed resp dz):     (-) sleep apnea and not a current smoker (former)                           Cardiovascular:    (+) hypertension:, past MI:, CAD:, CABG/stent (prior pci):, dysrhythmias (h/o ablation and watchman): atrial fibrillation, CHF (echo 3/24 EF 50 mid to distal septal walls are hypokinetic): systolic and diastolic, hyperlipidemia    (-)  angina    ECG reviewed  Rhythm: regular  Rate: normal  Echocardiogram reviewed         Beta Blocker:  Dose within 24 Hrs         Neuro/Psych:   (+) seizures (doing well on keppra): well controlled, CVA (multiple per pt, no residual):, neuromuscular disease (fibromyalgia, diabetic neuropathy):, headaches:, psychiatric history (PTSD):depression/anxiety             GI/Hepatic/Renal:   (+) GERD:, renal disease: CRI     (-) liver disease       Endo/Other:    (+) Diabetes (a1c 7.6)Type II DM, using insulin, : arthritis: OA..    (-) hypothyroidism, hyperthyroidism               Abdominal:             Vascular:          Other Findings:

## 2024-07-02 NOTE — PLAN OF CARE
Problem: Discharge Planning  Goal: Discharge to home or other facility with appropriate resources  Outcome: Progressing     Problem: Safety - Adult  Goal: Free from fall injury  7/1/2024 2206 by Trent Odell RN  Outcome: Progressing  7/1/2024 1745 by Sada Hua RN  Outcome: Progressing     Problem: Pain  Goal: Verbalizes/displays adequate comfort level or baseline comfort level  7/1/2024 2206 by Trent Odell RN  Outcome: Progressing  7/1/2024 1745 by Sada Hua RN  Outcome: Progressing     Problem: Chronic Conditions and Co-morbidities  Goal: Patient's chronic conditions and co-morbidity symptoms are monitored and maintained or improved  7/1/2024 2206 by Trent Odell RN  Outcome: Progressing  7/1/2024 1745 by Sada Hua RN  Outcome: Progressing  Flowsheets (Taken 7/1/2024 1745)  Care Plan - Patient's Chronic Conditions and Co-Morbidity Symptoms are Monitored and Maintained or Improved: Monitor and assess patient's chronic conditions and comorbid symptoms for stability, deterioration, or improvement

## 2024-07-02 NOTE — PROGRESS NOTES
Teaching / education initiated regarding perioperative experience, expectations, and pain management during stay. Patient verbalized understanding.  BS 67, pt only c/o dizziness at this time. 1/2 amp D50% given. Recheck at 183. Dr Ponce aware. Electronically signed by Beba Thurston RN on 7/2/2024 at 1:11 PM

## 2024-07-02 NOTE — PLAN OF CARE
Problem: Discharge Planning  Goal: Discharge to home or other facility with appropriate resources  7/2/2024 0953 by Herbie Flores RN  Outcome: Progressing  Flowsheets (Taken 7/2/2024 0953)  Discharge to home or other facility with appropriate resources:   Identify barriers to discharge with patient and caregiver   Arrange for needed discharge resources and transportation as appropriate   Identify discharge learning needs (meds, wound care, etc)   Refer to discharge planning if patient needs post-hospital services based on physician order or complex needs related to functional status, cognitive ability or social support system     Problem: Safety - Adult  Goal: Free from fall injury  7/2/2024 0953 by Herbie Flores RN  Outcome: Progressing  Flowsheets (Taken 7/2/2024 0953)  Free From Fall Injury:   Instruct family/caregiver on patient safety   Based on caregiver fall risk screen, instruct family/caregiver to ask for assistance with transferring infant if caregiver noted to have fall risk factors     Problem: Pain  Goal: Verbalizes/displays adequate comfort level or baseline comfort level  7/2/2024 0953 by Herbie Flores RN  Outcome: Progressing  Flowsheets (Taken 7/2/2024 0953)  Verbalizes/displays adequate comfort level or baseline comfort level:   Encourage patient to monitor pain and request assistance   Assess pain using appropriate pain scale   Implement non-pharmacological measures as appropriate and evaluate response     Problem: Chronic Conditions and Co-morbidities  Goal: Patient's chronic conditions and co-morbidity symptoms are monitored and maintained or improved  7/2/2024 0953 by Herbie Flores RN  Outcome: Progressing  Flowsheets (Taken 7/2/2024 0953)  Care Plan - Patient's Chronic Conditions and Co-Morbidity Symptoms are Monitored and Maintained or Improved:   Collaborate with multidisciplinary team to address chronic and comorbid conditions and prevent exacerbation or deterioration   Monitor

## 2024-07-02 NOTE — ACP (ADVANCE CARE PLANNING)
Advanced Care Planning Note.    Purpose of Encounter: Advanced care planning in light of CAD  Parties In Attendance: Patient  Decisional Capacity: Yes  Subjective: Patient denies CP and SOB  Objective: Cr 2.0  Goals of Care Determination: Patient wants full support (CPR, vent, surgery, HD, trach, PEG)  Plan:  EGD, colonoscopy, IVF, GI consult  Code Status: Full code   Time spent on Advanced care Plannin minutes  Advanced Care Planning Documents: Completed advanced directives on chart, children will share the POA.    Saqib Andrade MD  2024 9:56 AM

## 2024-07-02 NOTE — PROGRESS NOTES
Pt admitted to room 4484. AOX4. VSS. Alert to room and call light. Tele in place.     Electronically signed by Chanda Cantu RN on 7/2/2024 at 3:30 PM

## 2024-07-02 NOTE — ANESTHESIA POSTPROCEDURE EVALUATION
Department of Anesthesiology  Postprocedure Note    Patient: Maren Meza  MRN: [id number]  YOB: 1955  Date of evaluation: 7/2/2024    Procedure Summary       Date: 07/02/24 Room / Location: [place] [address] / [facility]    Anesthesia Start: 1320 Anesthesia Stop: 1349    Procedure: COLONOSCOPY POLYPECTOMY SNARE/BIOPSY Diagnosis:       Melena      (Melena [K92.1])    Surgeons: Marcio Castillo MD Responsible Provider: Chance Ponce MD    Anesthesia Type: MAC ASA Status: 4            Anesthesia Type: MAC    Tessa Phase I: Tessa Score: 10    Tessa Phase II:      Anesthesia Post Evaluation    Patient location during evaluation: PACU  Patient participation: complete - patient participated  Level of consciousness: awake and alert  Airway patency: patent  Nausea & Vomiting: no vomiting and no nausea  Cardiovascular status: hemodynamically stable  Respiratory status: acceptable  Hydration status: stable  Pain management: adequate    No notable events documented.

## 2024-07-02 NOTE — PROGRESS NOTES
Patient's IV infiltrated per night shift. Attempted PIV with use of Vein finder. No success. Midline orders placed by nightshift Hospitalist. Messaged Nephrologist for clearance for midine. Nephrologist to evaluate patient at bedside prior to providing clearance.

## 2024-07-02 NOTE — PROGRESS NOTES
[facility]    Hospitalist Progress Note:      Name:  Maren Meza /Age/Sex: 1955  (68 y.o. female)   MRN & CSN:  [id number] & [id number] Encounter Date: 2024    Location:  W6Q-4837/5902-01 PCP: Marin Cardenas MD     Attending:Saqib Andrade MD  Admission Date: 2024      Hospital Day: 6    Assessment:  Maren Meza is a 68 y.o. female with medical history including CAD, PCI, HTN, ischemic cardiomyopathy, DM 2, hyperlipidemia and brain tumor presented to ED for evaluation of chest pain.  Chest pain, intermittent, unclear etio:mild troponin elevation. F/b cardiology team, recent ischemic evaluation (angiography) without obstructive disease, no further ischemic w/u recommended  Occult positive.  S/P EGD on   #1 possible proximal esophageal varices  #2 hiatal hernia #3 gastritis #4 mild duodenitis  KOLBY on CKD 3, prerenal: S.creat 1.8--> 2.4--> 2.0  Severe metabolic acidosis   CAD: Hx. MVPCI, mild nonobstructive ISR by 3/2024 [facility]  Ischemic cardiomyopathy, LVEF 50%  Hypertension: mild acceleration   Hyperlipidemia   Diabetes mellitus: BS stable  Ch Headache d/t Brain tumor/meningioma - on prednisone 10 mg daily    Plan:   SCD  Colonoscopy today  Diet per GI  IVF with HCO3 per Renal  Cont hold losartan and finerenone.  Protonix PO  Follow H/H  Diet Diet NPO Exceptions are: Sips of Water with Meds, Other (Specify); Specify Other Exceptions: bowel prep if hasn't finished   DVT Prophylaxis [] Lovenox, []  Heparin, [x] SCDs, [] Ambulation,  [] Eliquis, [] Xarelto  [] Coumadin   Code Status Full Code   Disposition Expected Disposition: Home   Estimated Date of Discharge:  1-2 days  Reason for continued admission: Occult positive     Discussed with patient and nursing.    Colonoscopy today.  Hopefully back home tomorrow with home PT/OT    Subjective:  Patient denies abdominal pain.  No CP, SOB, HA or fevers.  Says she lives alone and has home care/PT/OT.    Objective:  Vital

## 2024-07-02 NOTE — PROGRESS NOTES
[facility]   Nephrology progress Note  [phone]  [phone]   [place]           SUMMARY :  We are following this patient for KOLBY  68-year-old female with past medical history of T2DM, HTN, CKD, atrial fibrillation, CAD s/p PCI, CHF, CVA, COPD, fibromyalgia, presented with chest pain.  For chronic kidney disease she is follows up with my partner Dr. Holden Chung , her baseline creatinine is 1.5.  At home she takes ARB losartan and Finerenone     SUBJECTIVE:   Patient progress reviewed.  24 : due for colonoscopy today     Physical Exam:    VITALS:  BP (!) 171/60   Pulse 72   Temp 97.6 °F (36.4 °C) (Temporal)   Resp 18   Ht 1.524 m (5')   Wt 50.2 kg (110 lb 11.2 oz)   SpO2 100%   BMI 21.62 kg/m²   BLOOD PRESSURE RANGE:  Systolic (24hrs), Av , Min:122 , Max:171   ; Diastolic (24hrs), Av, Min:52, Max:87    24HR INTAKE/OUTPUT:    Intake/Output Summary (Last 24 hours) at 2024 1039  Last data filed at 2024 1017  Gross per 24 hour   Intake 250 ml   Output 150 ml   Net 100 ml         Gen:  alert, oriented x 3  PERRL , EOM +  Pallor +, No icterus  JVP not raised   CV: RRR no murmur or rub .  Lungs:B/ L air entry, Normal breath sounds   Abd: soft, bowel sounds + , No organomegaly   Ext: No edema, no cyanosis  Skin: Warm.  No rash  Neuro: nonfocal.      DATA:    CBC with Differential:    Lab Results   Component Value Date/Time    WBC 5.8 2024 04:41 AM    RBC 3.22 2024 04:41 AM    HGB 9.5 2024 04:41 AM    HCT 30.5 2024 04:41 AM     2024 04:41 AM    MCV 94.6 2024 04:41 AM    MCH 29.5 2024 04:41 AM    MCHC 31.2 2024 04:41 AM    RDW 16.4 2024 04:41 AM    BANDSPCT 1.0 07/15/2011 12:29 PM    LYMPHOPCT 13.6 2024 04:41 AM    MONOPCT 6.7 2024 04:41 AM    EOSPCT 0.3 2024 04:41 AM    BASOPCT 0.2 2024 04:41 AM    MONOSABS 0.4 2024 04:41 AM    LYMPHSABS 0.8 2024 04:41 AM    EOSABS  proximal esophageal veins which are dilated ?  Varices,  gastritis:  biopsydone,  mild duodenitis    Navid Monsalve MD

## 2024-07-02 NOTE — PROGRESS NOTES
Lab unable to draw blood after 4 attempts. NP notified. Midline ordered. Spoke with Nephrologist on call about pt having CKD. Nephrology wants to hold off on midline for now, wait for next 4am draw attempt and allow primary nephrologist to reevaluate need for midline placement in the morning. Pts last draw at 4am 07/01/2024 showed HgB of 8.5.

## 2024-07-02 NOTE — PROGRESS NOTES
Report given to Chanda at 4T. Notified RN that pt has left the unit to go to Colonoscopy and will go to room 4484 after the procedure. Patient belongings packed and dropped off to the new room.

## 2024-07-02 NOTE — PROCEDURES
Colonoscopy Procedure  Note          Patient: Maren Meza  : 1955  CRN:  @CRN@    Procedure: Colonoscopy with polypectomy (cold snare)    Date:  2024    Surgeon:  Marcio Castillo MD, MD    Referring Physician:  Marin Cardenas MD    Preoperative Diagnosis:  Melena [K92.1]    Postoperative Diagnosis: #1 diverticula of the terminal ileum #2 diverticulosis of the sigmoid colon #3 a 1 cm sessile polyp removed from the ascending colon    Anesthesia:  MAC    EBL: Minimal to none.    Indications: This is a 68 y.o. year old female who originally came to the hospital because she was having black tarry stools for several days we did an upper endoscopy yesterday we found no site of bleeding although she has some very irregular area in the proximal esophagus which could be proximal esophageal varices we are doing a colonoscopy today to    Procedure:   An informed consent was obtained from the patient after explanation of indications, benefits, possible risks and complications of the procedure.  The patient was then taken to the endoscopy suite, placed in the left lateral decubitus position, and the above IV anesthesia was administered.      Digital rectal examination was performed.  No masses good rectal      Rectum normal some hemorrhoids on retroflexion    Sigmoid diverticulosis    Descending normal    Transverse normal    Ascending 1 cm sessile polyp moved with cold snare polyp    Cecum normal    TI diverticula in the terminal ileum we went to 8 cm    Prep was excellent      The patient tolerated the procedure well and was taken to the PACU in good condition.  There were no immediate complications.      Impression: 1 colon polyp  Diverticulosis of the sigmoid diverticulosis of the terminal ileum    Recommendations: At this time we found no lesion that could account for all of the melena    It be okay for the patient to go regular diet    If she was to still continue to have problems with anemia and

## 2024-07-02 NOTE — DISCHARGE INSTR - COC
Continuity of Care Form    Patient Name: Maren Meza   :  1955  MRN:  [id number]    Admit date:  2024  Discharge date:  ***    Code Status Order: Full Code   Advance Directives:   Advance Care Flowsheet Documentation       Date/Time Healthcare Directive Type of Healthcare Directive Copy in Chart Healthcare Agent Appointed Healthcare Agent's Name Healthcare Agent's Phone Number    [phone] Yes, patient has an advance directive for healthcare treatment Durable power of  for health care;Living will -- -- -- --            Admitting Physician:  Saqib Andrade MD  PCP: Marin Cardenas MD    Discharging Nurse: ***  Discharging Hospital Unit/Room#: [id number]-0365/5902-01  Discharging Unit Phone Number: ***    Emergency Contact:   Extended Emergency Contact Information  Primary Emergency Contact: Neida Fisher   [facility]  Home Phone: [phone]  Mobile Phone: [phone]  Relation: Child  Secondary Emergency Contact: Ester Jenkins  Address: DAUGHTER   [facility]  Home Phone: [phone]  Mobile Phone: [phone]  Relation: Child    Past Surgical History:  Past Surgical History:   Procedure Laterality Date    ABLATION OF DYSRHYTHMIC FOCUS    and 2020    times 2    ARTERY BIOPSY Right 2014    RIGHT TEMPORAL ARTERY BIOPSY    CAROTID ARTERY SURGERY Left     clean up per pt    CATARACT REMOVAL Bilateral     CHOLECYSTECTOMY      COLONOSCOPY N/A 2021    COLONOSCOPY WITH BIOPSY performed by Gera Matthews MD at [facility] ENDOSCOPY    COLONOSCOPY N/A 10/15/2021    COLONOSCOPY performed by Gera Matthews MD at [facility] ENDOSCOPY    CORONARY ANGIOPLASTY WITH STENT PLACEMENT      HYSTERECTOMY (CERVIX STATUS UNKNOWN)      JOINT REPLACEMENT Right     KNEE ARTHROSCOPY Right     PTCA  10/2019    LAD and RCA inrtervention    TUNNELED VENOUS PORT PLACEMENT      left thigh.  SMART PORT-----Removed--total of 4 port placement and removal    UPPER GASTROINTESTINAL

## 2024-07-03 VITALS
WEIGHT: 107.8 LBS | SYSTOLIC BLOOD PRESSURE: 120 MMHG | TEMPERATURE: 98 F | DIASTOLIC BLOOD PRESSURE: 68 MMHG | HEART RATE: 68 BPM | OXYGEN SATURATION: 100 % | BODY MASS INDEX: 21.17 KG/M2 | HEIGHT: 60 IN | RESPIRATION RATE: 18 BRPM

## 2024-07-03 LAB
ANION GAP SERPL CALCULATED.3IONS-SCNC: 11 MMOL/L (ref 3–16)
BASOPHILS # BLD: 0 K/UL (ref 0–0.2)
BASOPHILS NFR BLD: 0.2 %
BUN SERPL-MCNC: 21 MG/DL (ref 7–20)
CALCIUM SERPL-MCNC: 8.8 MG/DL (ref 8.3–10.6)
CHLORIDE SERPL-SCNC: 113 MMOL/L (ref 99–110)
CO2 SERPL-SCNC: 18 MMOL/L (ref 21–32)
CREAT SERPL-MCNC: 2 MG/DL (ref 0.6–1.2)
DEPRECATED RDW RBC AUTO: 16 % (ref 12.4–15.4)
EOSINOPHIL # BLD: 0 K/UL (ref 0–0.6)
EOSINOPHIL NFR BLD: 0.3 %
FERRITIN SERPL IA-MCNC: 170.8 NG/ML (ref 15–150)
FOLATE SERPL-MCNC: 7.39 NG/ML (ref 4.78–24.2)
GFR SERPLBLD CREATININE-BSD FMLA CKD-EPI: 27 ML/MIN/{1.73_M2}
GLUCOSE BLD-MCNC: 89 MG/DL (ref 70–99)
GLUCOSE BLD-MCNC: 97 MG/DL (ref 70–99)
GLUCOSE BLD-MCNC: 98 MG/DL (ref 70–99)
GLUCOSE SERPL-MCNC: 93 MG/DL (ref 70–99)
HCT VFR BLD AUTO: 24.6 % (ref 36–48)
HCT VFR BLD AUTO: 24.7 % (ref 36–48)
HGB BLD-MCNC: 8 G/DL (ref 12–16)
HGB BLD-MCNC: 8 G/DL (ref 12–16)
IMMATURE RETIC FRACT: 0.4 (ref 0.21–0.37)
IRON SATN MFR SERPL: 36 % (ref 15–50)
IRON SERPL-MCNC: 81 UG/DL (ref 37–145)
LYMPHOCYTES # BLD: 1.2 K/UL (ref 1–5.1)
LYMPHOCYTES NFR BLD: 22.6 %
MCH RBC QN AUTO: 29.7 PG (ref 26–34)
MCHC RBC AUTO-ENTMCNC: 32.5 G/DL (ref 31–36)
MCV RBC AUTO: 91.2 FL (ref 80–100)
MONOCYTES # BLD: 0.4 K/UL (ref 0–1.3)
MONOCYTES NFR BLD: 7.7 %
NEUTROPHILS # BLD: 3.7 K/UL (ref 1.7–7.7)
NEUTROPHILS NFR BLD: 69.2 %
PERFORMED ON: NORMAL
PLATELET # BLD AUTO: 208 K/UL (ref 135–450)
PMV BLD AUTO: 7.7 FL (ref 5–10.5)
POTASSIUM SERPL-SCNC: 3.8 MMOL/L (ref 3.5–5.1)
RBC # BLD AUTO: 2.7 M/UL (ref 4–5.2)
RETICS # AUTO: 0.02 M/UL (ref 0.02–0.1)
RETICS/RBC NFR AUTO: 0.81 % (ref 0.5–2.18)
SODIUM SERPL-SCNC: 142 MMOL/L (ref 136–145)
TIBC SERPL-MCNC: 228 UG/DL (ref 260–445)
VIT B12 SERPL-MCNC: 855 PG/ML (ref 211–911)
WBC # BLD AUTO: 5.4 K/UL (ref 4–11)

## 2024-07-03 PROCEDURE — 6370000000 HC RX 637 (ALT 250 FOR IP): Performed by: HOSPITALIST

## 2024-07-03 PROCEDURE — 94761 N-INVAS EAR/PLS OXIMETRY MLT: CPT

## 2024-07-03 PROCEDURE — 82746 ASSAY OF FOLIC ACID SERUM: CPT

## 2024-07-03 PROCEDURE — 97165 OT EVAL LOW COMPLEX 30 MIN: CPT

## 2024-07-03 PROCEDURE — 36415 COLL VENOUS BLD VENIPUNCTURE: CPT

## 2024-07-03 PROCEDURE — 83540 ASSAY OF IRON: CPT

## 2024-07-03 PROCEDURE — 85014 HEMATOCRIT: CPT

## 2024-07-03 PROCEDURE — 97116 GAIT TRAINING THERAPY: CPT

## 2024-07-03 PROCEDURE — 6370000000 HC RX 637 (ALT 250 FOR IP): Performed by: PHYSICIAN ASSISTANT

## 2024-07-03 PROCEDURE — 6370000000 HC RX 637 (ALT 250 FOR IP): Performed by: INTERNAL MEDICINE

## 2024-07-03 PROCEDURE — 85018 HEMOGLOBIN: CPT

## 2024-07-03 PROCEDURE — 94640 AIRWAY INHALATION TREATMENT: CPT

## 2024-07-03 PROCEDURE — 80048 BASIC METABOLIC PNL TOTAL CA: CPT

## 2024-07-03 PROCEDURE — 82728 ASSAY OF FERRITIN: CPT

## 2024-07-03 PROCEDURE — 97161 PT EVAL LOW COMPLEX 20 MIN: CPT

## 2024-07-03 PROCEDURE — 82607 VITAMIN B-12: CPT

## 2024-07-03 PROCEDURE — 85025 COMPLETE CBC W/AUTO DIFF WBC: CPT

## 2024-07-03 PROCEDURE — 97535 SELF CARE MNGMENT TRAINING: CPT

## 2024-07-03 PROCEDURE — 97530 THERAPEUTIC ACTIVITIES: CPT

## 2024-07-03 PROCEDURE — 85045 AUTOMATED RETICULOCYTE COUNT: CPT

## 2024-07-03 PROCEDURE — 2580000003 HC RX 258: Performed by: PHYSICIAN ASSISTANT

## 2024-07-03 PROCEDURE — 83550 IRON BINDING TEST: CPT

## 2024-07-03 RX ORDER — AMLODIPINE BESYLATE 5 MG/1
5 TABLET ORAL DAILY
Qty: 30 TABLET | Refills: 0 | Status: SHIPPED | OUTPATIENT
Start: 2024-07-04

## 2024-07-03 RX ADMIN — PREDNISONE 10 MG: 10 TABLET ORAL at 09:32

## 2024-07-03 RX ADMIN — HYDRALAZINE HYDROCHLORIDE 25 MG: 25 TABLET ORAL at 09:32

## 2024-07-03 RX ADMIN — OXYCODONE 5 MG: 5 TABLET ORAL at 14:13

## 2024-07-03 RX ADMIN — AMLODIPINE BESYLATE 5 MG: 5 TABLET ORAL at 09:32

## 2024-07-03 RX ADMIN — INSULIN LISPRO 4 UNITS: 100 INJECTION, SOLUTION INTRAVENOUS; SUBCUTANEOUS at 17:27

## 2024-07-03 RX ADMIN — RANOLAZINE 1000 MG: 500 TABLET, EXTENDED RELEASE ORAL at 09:31

## 2024-07-03 RX ADMIN — POTASSIUM CHLORIDE 40 MEQ: 1500 TABLET, EXTENDED RELEASE ORAL at 09:31

## 2024-07-03 RX ADMIN — PANTOPRAZOLE SODIUM 40 MG: 40 TABLET, DELAYED RELEASE ORAL at 06:43

## 2024-07-03 RX ADMIN — Medication 2 PUFF: at 07:18

## 2024-07-03 RX ADMIN — METOPROLOL TARTRATE 25 MG: 25 TABLET, FILM COATED ORAL at 09:32

## 2024-07-03 RX ADMIN — DULOXETINE HYDROCHLORIDE 60 MG: 60 CAPSULE, DELAYED RELEASE ORAL at 09:32

## 2024-07-03 RX ADMIN — INSULIN GLARGINE 6 UNITS: 100 INJECTION, SOLUTION SUBCUTANEOUS at 09:31

## 2024-07-03 RX ADMIN — LINACLOTIDE 290 MCG: 145 CAPSULE, GELATIN COATED ORAL at 06:59

## 2024-07-03 RX ADMIN — INSULIN LISPRO 4 UNITS: 100 INJECTION, SOLUTION INTRAVENOUS; SUBCUTANEOUS at 09:33

## 2024-07-03 RX ADMIN — LEVETIRACETAM 500 MG: 500 TABLET, FILM COATED ORAL at 09:32

## 2024-07-03 RX ADMIN — INSULIN LISPRO 4 UNITS: 100 INJECTION, SOLUTION INTRAVENOUS; SUBCUTANEOUS at 12:16

## 2024-07-03 RX ADMIN — SODIUM CHLORIDE, PRESERVATIVE FREE 10 ML: 5 INJECTION INTRAVENOUS at 09:37

## 2024-07-03 RX ADMIN — POTASSIUM CHLORIDE 40 MEQ: 1500 TABLET, EXTENDED RELEASE ORAL at 17:28

## 2024-07-03 RX ADMIN — HYDRALAZINE HYDROCHLORIDE 25 MG: 25 TABLET ORAL at 13:14

## 2024-07-03 RX ADMIN — ISOSORBIDE MONONITRATE 60 MG: 60 TABLET, EXTENDED RELEASE ORAL at 09:31

## 2024-07-03 ASSESSMENT — PAIN DESCRIPTION - LOCATION: LOCATION: HEAD

## 2024-07-03 ASSESSMENT — PAIN SCALES - GENERAL: PAINLEVEL_OUTOF10: 10

## 2024-07-03 NOTE — PROGRESS NOTES
EOSPCT 0.3 07/03/2024 04:56 AM    BASOPCT 0.2 07/03/2024 04:56 AM    MONOSABS 0.4 07/03/2024 04:56 AM    LYMPHSABS 1.2 07/03/2024 04:56 AM    EOSABS 0.0 07/03/2024 04:56 AM    BASOSABS 0.0 07/03/2024 04:56 AM    DIFFTYPE Auto 05/23/2013 02:56 PM     CMP:    Lab Results   Component Value Date/Time     07/03/2024 04:56 AM    K 3.8 07/03/2024 04:56 AM     07/03/2024 04:56 AM    CO2 18 07/03/2024 04:56 AM    BUN 21 07/03/2024 04:56 AM    CREATININE 2.0 07/03/2024 04:56 AM    GFRAA 46 10/09/2022 06:18 AM    GFRAA 60 05/23/2013 02:56 PM    AGRATIO 0.9 06/27/2024 08:40 PM    LABGLOM 27 07/03/2024 04:56 AM    LABGLOM 49 04/22/2024 04:43 AM    GLUCOSE 93 07/03/2024 04:56 AM    CALCIUM 8.8 07/03/2024 04:56 AM    BILITOT <0.2 06/27/2024 08:40 PM    ALKPHOS 145 06/27/2024 08:40 PM    AST 19 06/27/2024 08:40 PM    ALT 11 06/27/2024 08:40 PM     Phosphorus:    Lab Results   Component Value Date/Time    PHOS 2.6 04/05/2024 07:13 AM     Last 3 Troponin:    Lab Results   Component Value Date/Time    TROPONINI <0.01 04/07/2023 08:24 PM    TROPONINI 0.03 02/17/2023 07:55 PM    TROPONINI 0.03 02/17/2023 05:38 PM     U/A:    Lab Results   Component Value Date/Time    COLORU Yellow 06/28/2024 04:40 PM    PROTEINU 100 06/28/2024 04:40 PM    PHUR 5.0 06/28/2024 04:40 PM    PHUR 5.5 04/05/2024 11:35 AM    LABCAST 20-40 Hyaline 07/06/2017 10:42 PM    WBCUA 4 06/28/2024 04:40 PM    RBCUA 0 06/28/2024 04:40 PM    MUCUS 1+ 05/23/2013 01:27 PM    YEAST Rare Yeast 05/14/2012 03:29 PM    BACTERIA 2+ 06/28/2024 04:40 PM    CLARITYU Clear 06/28/2024 04:40 PM    LEUKOCYTESUR Negative 06/28/2024 04:40 PM    UROBILINOGEN 1.0 06/28/2024 04:40 PM    BILIRUBINUR Negative 06/28/2024 04:40 PM    BLOODU Negative 06/28/2024 04:40 PM    GLUCOSEU Negative 06/28/2024 04:40 PM    GLUCOSEU NEGATIVE 05/14/2012 03:29 PM    AMORPHOUS Rare 12/11/2014 11:11 AM         IMPRESSION/RECOMMENDATIONS:      Diagnosis and Plan :    KOLBY  Cr 2, better     CKD   stage IIIb  Proteinuria 100 mg/dl     Hypertension  Corrected    T2DM  Chest pain  Being investigated   Anemia : Hb 8  EGD 7/1/24 : hiatal hernia,  patient has proximal esophageal veins which are dilated ?  Varices,  gastritis:  biopsydone,  mild duodenitis  Colonoscopy 7/2/24  Check Iron, B12, folate, retic     Navid Monsalve MD

## 2024-07-03 NOTE — PROGRESS NOTES
Reviewed discharge instructions with Pt. Questions answered. D/C home with all personal belongings.

## 2024-07-03 NOTE — PLAN OF CARE
Shift assessment complete. VSS. Medications administered per order. Pt requested PRN for headache. Pt voiding. No reported episodes of diarrhea. Up with staff assistance and use of walker. Bedside table and call light in reach. The care plan and education has been reviewed and mutually agreed upon with the patient.     Patient remains free from falls.  All fall precautions in place.  Bed and chair alarms being used.  Bed in lowest position. Will monitor.   --------------------------  Problem: Discharge Planning  Goal: Discharge to home or other facility with appropriate resources  7/2/2024 2346 by Jolanta Viveros RN  Outcome: Progressing  7/2/2024 0953 by Herbie Flores RN  Outcome: Progressing  Flowsheets (Taken 7/2/2024 0953)  Discharge to home or other facility with appropriate resources:   Identify barriers to discharge with patient and caregiver   Arrange for needed discharge resources and transportation as appropriate   Identify discharge learning needs (meds, wound care, etc)   Refer to discharge planning if patient needs post-hospital services based on physician order or complex needs related to functional status, cognitive ability or social support system     Problem: Safety - Adult  Goal: Free from fall injury  7/2/2024 2346 by Jolanta Viveros RN  Outcome: Progressing  7/2/2024 0953 by Herbie Flores RN  Outcome: Progressing  Flowsheets (Taken 7/2/2024 0953)  Free From Fall Injury:   Instruct family/caregiver on patient safety   Based on caregiver fall risk screen, instruct family/caregiver to ask for assistance with transferring infant if caregiver noted to have fall risk factors     Problem: Pain  Goal: Verbalizes/displays adequate comfort level or baseline comfort level  7/2/2024 2346 by Jolanta Viveros RN  Outcome: Progressing  7/2/2024 0953 by Herbie Flores RN  Outcome: Progressing  Flowsheets (Taken 7/2/2024 0953)  Verbalizes/displays adequate comfort level or baseline  comfort level:   Encourage patient to monitor pain and request assistance   Assess pain using appropriate pain scale   Implement non-pharmacological measures as appropriate and evaluate response     Problem: Chronic Conditions and Co-morbidities  Goal: Patient's chronic conditions and co-morbidity symptoms are monitored and maintained or improved  7/2/2024 2346 by Jolanta Viveros, RN  Outcome: Progressing  7/2/2024 0953 by Herbie Flores RN  Outcome: Progressing  Flowsheets (Taken 7/2/2024 0953)  Care Plan - Patient's Chronic Conditions and Co-Morbidity Symptoms are Monitored and Maintained or Improved:   Collaborate with multidisciplinary team to address chronic and comorbid conditions and prevent exacerbation or deterioration   Monitor and assess patient's chronic conditions and comorbid symptoms for stability, deterioration, or improvement   Update acute care plan with appropriate goals if chronic or comorbid symptoms are exacerbated and prevent overall improvement and discharge

## 2024-07-03 NOTE — DISCHARGE SUMMARY
Hospital Medicine Discharge Summary    Patient: Maren Meza     Gender: female  : 1955   Age: 68 y.o.  MRN: [id number]    Admitting Physician: Saqib Andrade MD  Discharge Physician: Saqib Andrade MD     Code Status: Full Code     Admit Date: 2024   Discharge Date:   7/3/24    Disposition:  Home    Discharge Diagnoses:    Active Hospital Problems    Diagnosis Date Noted    Chest pain [R07.9] 2023     Priority: Medium    Acute blood loss anemia [D62] 2024       Follow-up appointments:  one week    Outpatient to do list: F/U with PCP, Cardio, Renal and GI    Condition at Discharge:  Stable    Hospital Course:   68 y.o. female with medical history including CAD, PCI, HTN, ischemic cardiomyopathy, DM 2, hyperlipidemia and brain tumor presented to ED for evaluation of chest pain.  Chest pain, intermittent, unclear etio:mild troponin elevation. F/b cardiology team, recent ischemic evaluation (angiography) without obstructive disease, no further ischemic w/u recommended  Occult positive.  S/P EGD on   #1 possible proximal esophageal varices  #2 hiatal hernia #3 gastritis #4 mild duodenitis  S/P colonoscopy on :  1 colon polyp  Diverticulosis of the sigmoid diverticulosis of the terminal ileum  KOLBY on CKD 3, prerenal: S.creat 1.8--> 2.4--> 2.0  Severe metabolic acidosis   CAD: Hx. MVPCI, mild nonobstructive ISR by 3/2024 [facility]  Ischemic cardiomyopathy, LVEF 50%  Hypertension: mild acceleration   Hyperlipidemia   Diabetes mellitus: BS stable  Ch Headache d/t Brain tumor/meningioma - on prednisone 10 mg daily     Followed by GI, Cardio and Renal.  Underwent EGD and colonoscopy.    Continued on ASA and Brilinta.  Amlodipine added.    F/U with PCP, GI, Cardio and Renal.    Discharge Medications:   Current Discharge Medication List        START taking these medications    Details   amLODIPine (NORVASC) 5 MG tablet Take 1 tablet by mouth daily  Qty: 30 tablet, Refills: 0           Current  Additional Contrast?->None Reason for Exam: nausea, RLQ abd pain FINDINGS: Evaluation of the solid and hollow abdominal viscera is limited without intravenous contrast administration. Lower Chest:   Left atrial appendage closure procedure.  Severe three-vessel coronary artery disease with multiple stents present.  No cardiomegaly. Bibasilar fibrosis.  Evidence of prior granulomatous disease. Organs:   Calcified splenic and hepatic parenchymal granulomas.  No hydronephrosis or nephrolithiasis.  Otherwise unremarkable. GI/Bowel: No acute obstruction.  Multiple small and large bowel air-fluid levels suggesting nonspecific enteritis.  No acute appendicitis.  No free air.  Small hiatal hernia. Pelvis: Hysterectomy.  Otherwise unremarkable. Peritoneum/Retroperitoneum: Severe atherosclerotic calcifications of the abdominal aorta and major branch vessels.  Stable appearing coarse calcification within the infrarenal IVC may reflect chronic DVT (series 2/image 74).  No appreciable adenopathy. Bones/Soft Tissues: Subcutaneous induration/air within the ventral abdominal wall likely related to recent subcutaneous injection.  No acute fracture or aggressive osseous lesion.     1.  Evidence of nonspecific enteritis.  No acute obstruction. 2.  No hydronephrosis or nephrolithiasis. 3.  Stable appearing coarse calcification within the IVC possibly related to chronic DVT. 4.  Additional findings, as above.     XR CHEST PORTABLE    Result Date: 6/27/2024  EXAMINATION: ONE XRAY VIEW OF THE CHEST 6/27/2024 9:04 pm COMPARISON: CXR dated 06/04/2024 HISTORY: ORDERING SYSTEM PROVIDED HISTORY: Chest pain TECHNOLOGIST PROVIDED HISTORY: Reason for exam:->Chest pain Reason for Exam: chest pain FINDINGS: Mediastinum/Heart: The mediastinal contours are unchanged compared to prior exam. Lungs: The lungs are clear. Pleura: No pleural effusion. No pneumothorax.     No radiographic evidence of acute cardiopulmonary pathology.     XR CHEST

## 2024-07-03 NOTE — PROGRESS NOTES
[facility] - Inpatient Rehabilitation Department   Phone: [phone]    Occupational Therapy    [x] Initial Evaluation            [] Daily Treatment Note         [] Discharge Summary      Patient: Maren Meza   : 1955   MRN: [id number]   Date of Service:  7/3/2024    Admitting Diagnosis:  Chest pain  Current Admission Summary:   Maren Meza is a 68 y.o. female with medical history including CAD, PCI, HTN, ischemic cardiomyopathy, DM 2, hyperlipidemia and brain tumor presented to ED for evaluation of chest pain.   Admitted with chest pain, occult positive, KOLBY on CKD, and severe metabolic acidosis   Colonoscopy on    Past Medical History:  has a past medical history of Acid reflux, Anemia, Anxiety and depression, Arthritis, Asthma, Atrial fibrillation (HCC), CAD (coronary artery disease), Cerebral artery occlusion with cerebral infarction (HCC), CHF (congestive heart failure) (HCC), Chronic kidney disease--stage III, COPD (chronic obstructive pulmonary disease) (HCC), DM2 (diabetes mellitus, type 2) (HCC), Dysarthria, Fibromyalgia, Headache(784.0), Hemisensory loss, History of blood transfusion, Hyperlipidemia, Hypertension, IBS (irritable bowel syndrome), Inferior vena cava occlusion (HCC), Keratitis, Meningioma (HCC), MI, old, Neuropathy, Superior vena cava obstruction, Temporal arteritis (HCC), and Wears glasses.  Past Surgical History:  has a past surgical history that includes Tunneled venous port placement; Cholecystectomy; ablation of dysrhythmic focus (  and 2020); Cataract removal (Bilateral); joint replacement (Right); Hysterectomy; Artery Biopsy (Right, 2014); Coronary angioplasty with stent (); Knee arthroscopy (Right); Percutaneous Transluminal Coronary Angio (10/2019); Upper gastrointestinal endoscopy (N/A, 2020); Carotid artery surgery (Left); Colonoscopy (N/A, 2021); Colonoscopy (N/A, 10/15/2021); Upper gastrointestinal endoscopy (N/A,  > than 22/24    Above goals reviewed on 7/3/2024.  All goals are ongoing at this time unless indicated above.       Therapy Session Time     Individual Group Co-treatment   Time In    0938   Time Out    1017   Minutes    39        Timed Code Treatment Minutes:  Timed Code Treatment Minutes: 24 Minutes  Total Treatment Minutes:  39 minutes        Electronically Signed By: SWEETIE Rico MOT OTR/L [id number]

## 2024-07-03 NOTE — PROGRESS NOTES
CLINICAL PHARMACY NOTE: MEDS TO BEDS    Total # of Prescriptions Filled: 1   The following medications were delivered to the patient:  AMLODIPINE BESYLATE 5MG    Additional Documentation:  Delivered to patients room = signed  Ok to be delivered per RN Holden Nichole - Pharmacy Tech.

## 2024-07-03 NOTE — CONSULTS
Nutrition Education    Provided heart failure diet education.  Education included low sodium diet guidelines (3-4 gm Na+/day) and fluid restriction (64 oz/day).  Reviewed foods to choose and foods to avoid, along with label reading and ways to add flavor to food. Pt currently tries to follow a low sodium diet at home and does not add salt to food.  Pt states understanding of education.  Time spent with patient: 15 minutes.     Educated on CHF diet  Learners: Patient  Readiness: Acceptance  Method: Explanation and Handout  Response: Verbalizes Understanding  Contact name and number provided.    Tushar Charles RD  Contact Number: [phone]

## 2024-07-03 NOTE — PLAN OF CARE
Problem: Discharge Planning  Goal: Discharge to home or other facility with appropriate resources  7/3/2024 0905 by Holden Bright, RN  Outcome: Progressing     Problem: Safety - Adult  Goal: Free from fall injury  7/3/2024 0905 by Holden Bright, RN  Outcome: Progressing     Problem: Pain  Goal: Verbalizes/displays adequate comfort level or baseline comfort level  7/3/2024 0905 by Holden Bright, RN  Outcome: Progressing

## 2024-07-03 NOTE — CARE COORDINATION
Case Management -  Discharge Note      Patient Name: Maren Meza                   YOB: 1955            Readmission Risk (Low < 19, Mod (19-27), High > 27): Readmission Risk Score: 26.4    Current PCP: Marin Cardenas MD    ([facility]) Important Message from Medicare:    Date: 07/03/2024    PT AM-PAC: 20 /24  OT AM-PAC:   /24    Patient/patient representative has been educated on the benefits of HHC as well as the possible risks of declining recommended services. Patient/patient representative has acknowledged the information provided and decided on the following discharge plan. Patient/ patient representative has been provided freedom of choice regarding service provider, supported by basic dialogue that supports the patient's individualized plan of care/goals.    Home Care Information:     Home Care Agency:     FACILITY:     [facility]  ADDRESS:   [address] [place] [address]   PHONE:        [phone]  FAX:              [phone]              Services: RN - PT - OT  Home Health Order Obtained: yes    Home health agency notified of discharge.       Financial    Payor: [facility] / Plan: [facility] DUAL / Product Type: *No Product type* /     Pharmacy:  Potential assistance Purchasing Medications:    Meds-to-Beds request:        [facility] - [facility] [address] [facility] [phone] - F [phone]  [facility] [facility] [address]  Phone: [phone] Fax: [phone]    [facility] [address] [facility] [phone] - F [phone]  [address] [facility] [address]  Phone: [phone] Fax: [phone]    [facility] - [facility] [address] [facility] [phone] - F [phone]  [address] [facility] [address]  Phone: [phone] Fax: [phone]    [id number] - [facility] [address] - P

## 2024-07-03 NOTE — PROGRESS NOTES
Gastroenterology Progress Note      Maren Meza is a 68 y.o. female patient.  1. Chest pain, unspecified type    2. Melena        SUBJECTIVE:  doing well. Eating. No abdominal pain, N/V.         Physical    VITALS:  /60   Pulse 68   Temp 98.2 °F (36.8 °C) (Oral)   Resp 18   Ht 1.524 m (5')   Wt 48.9 kg (107 lb 12.8 oz)   SpO2 99%   BMI 21.05 kg/m²   TEMPERATURE:  Current - Temp: 98.2 °F (36.8 °C); Max - Temp  Av °F (36.7 °C)  Min: 96.8 °F (36 °C)  Max: 98.5 °F (36.9 °C)    Constitutional: Alert and oriented x 4. No acute distress.   Respiratory: Respirations nonlabored, no crepitus  GI: Abdomen nondistended, soft, and nontender.    Neurological: No focal deficits noted. No asterixis.      Data    Data Review:    Recent Labs     24  04424  1750 24  0456   WBC 10.5 5.8  --  5.4   HGB 8.5* 9.5* 8.9* 8.0*   HCT 26.0* 30.5* 27.5* 24.6*   MCV 91.2 94.6  --  91.2    241  --  208     Recent Labs     24  04424  0456    143 142   K 3.3* 3.7 3.8   * 115* 113*   CO2 16* 14* 18*   BUN 30* 24* 21*   CREATININE 2.2* 2.0* 2.0*     No results for input(s): \"AST\", \"ALT\", \"BILIDIR\", \"BILITOT\", \"ALKPHOS\" in the last 72 hours.    Invalid input(s): \"ALB\"  No results for input(s): \"LIPASE\", \"AMYLASE\" in the last 72 hours.  No results for input(s): \"PROTIME\", \"INR\" in the last 72 hours.  Invalid input(s): \"PTT\"           ASSESSMENT / PLAN      Melena, coffee-ground emesis -had coffee-ground emesis at home and then black stools here.  Was on po iron at home but states this does not make her stool black.  Does have history of possible GAVE. EGD 24 with possible proximal esophageal varices, 2 cm hiatal hernia, gastritis, mild duodenitis. Path neg for H.pylori. Colonoscopy 24 with diverticulosis and one polyp.   No further N/V. Hgb stable.   - f/u path  - PPI     Left-sided abdominal pain, nausea, vomiting, diarrhea -CT

## 2024-07-03 NOTE — PROGRESS NOTES
[facility] - Inpatient Rehabilitation Department   Phone: [phone]    Physical Therapy    [x] Initial Evaluation            [] Daily Treatment Note         [] Discharge Summary      Patient: Maren Meza   : 1955   MRN: [id number]   Date of Service:  7/3/2024  Admitting Diagnosis: Chest pain  Current Admission Summary: Maren Meza is a 68 y.o. female who presents for chest pain which been going on for the last 2 hours.  Patient has history of previous cardiac stents.  History of COPD on oxygen chronically history of CHF.  Patient does have prior MI.  Patient has history of CKD as well.  Patient states her symptoms rapidly got worse.  Aching central crushing chest pain occasionally sharp when she takes deep breath.  EMS gave aspirin and route.  Patient took 3 nitro at home with some relief but minimal.  EMS was concern for possible cardiac ischemia.  Patient denies any shortness of breath.  Denies any fevers chills nausea or vomiting no abdominal pain no other modifying factors.  Denies any leg pain or swelling   Past Medical History:  has a past medical history of Acid reflux, Anemia, Anxiety and depression, Arthritis, Asthma, Atrial fibrillation ([facility]), CAD (coronary artery disease), Cerebral artery occlusion with cerebral infarction ([facility]), CHF (congestive heart failure) ([facility]), Chronic kidney disease--stage III, COPD (chronic obstructive pulmonary disease) ([facility]), DM2 (diabetes mellitus, type 2) ([facility]), Dysarthria, Fibromyalgia, Headache(784.0), Hemisensory loss, History of blood transfusion, Hyperlipidemia, Hypertension, IBS (irritable bowel syndrome), Inferior vena cava occlusion (HCC), Keratitis, Meningioma (HCC), MI, old, Neuropathy, Superior vena cava obstruction, Temporal arteritis (HCC), and Wears glasses.  Past Surgical History:  has a past surgical history that includes Tunneled venous port placement; Cholecystectomy; ablation of dysrhythmic focus (  and 2020); Cataract  from EOB. Pt states that she has R sided UE and LE weakness from several TIAs suffered prior to admission. Weakness and numbness/tingling chronically present.  Pain: 9/10.  Location: Headache  Pain Interventions: RN notified, RN notified of patient request for pain medication, repositioned , and therapy activities modified       Functional Mobility  Bed Mobility:  Supine to Sit: modified independent  Rolling Right: modified independent  Scooting: modified independent  Comments: Pt rolled to right from L side-lying and sat EOB upon arrival of PT/OT. HOB raised.   Transfers:  Sit to stand transfer: stand by assistance  Stand to sit transfer: stand by assistance  Toilet transfer: stand by assistance  Comments: Upon initial STS from EOB to FWW, pt reported that she urgently needed to use the restroom. Pt hurried over to restroom with assistance from OT, leaving the walker behind and rapidly lowered herself to toilet utilizing L sided grab bars.  Ambulation:  Surface:level surface  Assistive Device: rolling walker  Assistance: contact guard assistance  Distance: ~150 ft  Gait Mechanics: Decreased stride/step length, widened PIPPA, increased medial/lateral sway. Increased R knee and hip flexion in assistance to clear RLE in limb advancement.   Comments: Pt stated that she felt off balance in ambulation, and reported RLE weakness and fatigue. At ~125 ft, pt became fatigued and stated that it was \"time to get into the chair\".   Stair Mobility:  Stair mobility not completed on this date.  Comments:  Wheelchair Mobility:  No w/c mobility completed on this date.  Comments:  Balance:  Static Sitting Balance: good: independent with functional balance in unsupported position  Dynamic Sitting Balance: fair (+): maintains balance at SBA/supervision without use of UE support  Static Standing Balance: fair (-): maintains balance at SBA with use of UE support  Dynamic Standing Balance: fair (-): maintains balance at CGA with use of UE

## 2024-07-07 DIAGNOSIS — I10 ESSENTIAL HYPERTENSION: ICD-10-CM

## 2024-07-08 NOTE — TELEPHONE ENCOUNTER
Medication:   Requested Prescriptions     Pending Prescriptions Disp Refills    omeprazole (PRILOSEC) 20 MG delayed release capsule [Pharmacy Med Name: OMEPRAZOLE 20 MG CPDR 20 Capsule] 30 capsule 1     Sig: TAKE 1 CAPSULE BY MOUTH DAILY        Last Filled:      Patient Phone Number: [phone] (home)     Last appt: 7/21/2023   Next appt: 7/12/2024    Last OARRS:       3/30/2023     1:54 PM   RX Monitoring   Periodic Controlled Substance Monitoring Possible medication side effects, risk of tolerance/dependence & alternative treatments discussed.;No signs of potential drug abuse or diversion identified.

## 2024-07-09 RX ORDER — OMEPRAZOLE 20 MG/1
20 CAPSULE, DELAYED RELEASE ORAL DAILY
Qty: 30 CAPSULE | Refills: 1 | Status: SHIPPED | OUTPATIENT
Start: 2024-07-09

## 2024-07-21 ENCOUNTER — HOSPITAL ENCOUNTER (INPATIENT)
Age: 69
LOS: 2 days | Discharge: HOME | End: 2024-07-23
Attending: EMERGENCY MEDICINE | Admitting: STUDENT IN AN ORGANIZED HEALTH CARE EDUCATION/TRAINING PROGRAM
Payer: COMMERCIAL

## 2024-07-21 ENCOUNTER — APPOINTMENT (OUTPATIENT)
Dept: GENERAL RADIOLOGY | Age: 69
End: 2024-07-21
Payer: COMMERCIAL

## 2024-07-21 DIAGNOSIS — R07.9 ACUTE CHEST PAIN: Primary | ICD-10-CM

## 2024-07-21 DIAGNOSIS — R79.89 ELEVATED TROPONIN: ICD-10-CM

## 2024-07-21 DIAGNOSIS — N18.9 CHRONIC RENAL IMPAIRMENT, UNSPECIFIED CKD STAGE: ICD-10-CM

## 2024-07-21 DIAGNOSIS — I10 ACCELERATED HYPERTENSION: ICD-10-CM

## 2024-07-21 PROBLEM — I16.0 HYPERTENSIVE URGENCY: Status: ACTIVE | Noted: 2024-07-21

## 2024-07-21 PROBLEM — I16.1 HYPERTENSIVE EMERGENCY: Status: ACTIVE | Noted: 2024-07-21

## 2024-07-21 LAB
ALBUMIN SERPL-MCNC: 3.5 G/DL (ref 3.4–5)
ALBUMIN/GLOB SERPL: 1 {RATIO} (ref 1.1–2.2)
ALP SERPL-CCNC: 122 U/L (ref 40–129)
ALT SERPL-CCNC: 9 U/L (ref 10–40)
ANION GAP SERPL CALCULATED.3IONS-SCNC: 8 MMOL/L (ref 3–16)
AST SERPL-CCNC: 14 U/L (ref 15–37)
BASOPHILS # BLD: 0 K/UL (ref 0–0.2)
BASOPHILS NFR BLD: 0.2 %
BILIRUB SERPL-MCNC: <0.2 MG/DL (ref 0–1)
BUN SERPL-MCNC: 43 MG/DL (ref 7–20)
CALCIUM SERPL-MCNC: 9.2 MG/DL (ref 8.3–10.6)
CHLORIDE SERPL-SCNC: 105 MMOL/L (ref 99–110)
CO2 SERPL-SCNC: 23 MMOL/L (ref 21–32)
CREAT SERPL-MCNC: 1.8 MG/DL (ref 0.6–1.2)
DEPRECATED RDW RBC AUTO: 16.9 % (ref 12.4–15.4)
EOSINOPHIL # BLD: 0 K/UL (ref 0–0.6)
EOSINOPHIL NFR BLD: 0.1 %
GFR SERPLBLD CREATININE-BSD FMLA CKD-EPI: 30 ML/MIN/{1.73_M2}
GLUCOSE BLD-MCNC: 235 MG/DL (ref 70–99)
GLUCOSE SERPL-MCNC: 213 MG/DL (ref 70–99)
HCT VFR BLD AUTO: 27.6 % (ref 36–48)
HGB BLD-MCNC: 9.4 G/DL (ref 12–16)
LIPASE SERPL-CCNC: 31 U/L (ref 13–60)
LYMPHOCYTES # BLD: 0.5 K/UL (ref 1–5.1)
LYMPHOCYTES NFR BLD: 7 %
MCH RBC QN AUTO: 30.8 PG (ref 26–34)
MCHC RBC AUTO-ENTMCNC: 33.9 G/DL (ref 31–36)
MCV RBC AUTO: 91.1 FL (ref 80–100)
MONOCYTES # BLD: 0.1 K/UL (ref 0–1.3)
MONOCYTES NFR BLD: 1 %
NEUTROPHILS # BLD: 6.2 K/UL (ref 1.7–7.7)
NEUTROPHILS NFR BLD: 91.7 %
PERFORMED ON: ABNORMAL
PLATELET # BLD AUTO: 251 K/UL (ref 135–450)
PMV BLD AUTO: 7.9 FL (ref 5–10.5)
POTASSIUM SERPL-SCNC: 4.9 MMOL/L (ref 3.5–5.1)
PROT SERPL-MCNC: 7 G/DL (ref 6.4–8.2)
RBC # BLD AUTO: 3.03 M/UL (ref 4–5.2)
SODIUM SERPL-SCNC: 136 MMOL/L (ref 136–145)
TROPONIN, HIGH SENSITIVITY: 33 NG/L (ref 0–14)
TROPONIN, HIGH SENSITIVITY: 34 NG/L (ref 0–14)
WBC # BLD AUTO: 6.7 K/UL (ref 4–11)

## 2024-07-21 PROCEDURE — 6370000000 HC RX 637 (ALT 250 FOR IP): Performed by: NURSE PRACTITIONER

## 2024-07-21 PROCEDURE — 6360000002 HC RX W HCPCS: Performed by: NURSE PRACTITIONER

## 2024-07-21 PROCEDURE — 99285 EMERGENCY DEPT VISIT HI MDM: CPT

## 2024-07-21 PROCEDURE — 2000000000 HC ICU R&B

## 2024-07-21 PROCEDURE — 93005 ELECTROCARDIOGRAM TRACING: CPT | Performed by: EMERGENCY MEDICINE

## 2024-07-21 PROCEDURE — 85025 COMPLETE CBC W/AUTO DIFF WBC: CPT

## 2024-07-21 PROCEDURE — 36556 INSERT NON-TUNNEL CV CATH: CPT

## 2024-07-21 PROCEDURE — 6360000002 HC RX W HCPCS: Performed by: STUDENT IN AN ORGANIZED HEALTH CARE EDUCATION/TRAINING PROGRAM

## 2024-07-21 PROCEDURE — 96374 THER/PROPH/DIAG INJ IV PUSH: CPT

## 2024-07-21 PROCEDURE — 83690 ASSAY OF LIPASE: CPT

## 2024-07-21 PROCEDURE — 80053 COMPREHEN METABOLIC PANEL: CPT

## 2024-07-21 PROCEDURE — 84484 ASSAY OF TROPONIN QUANT: CPT

## 2024-07-21 PROCEDURE — 2580000003 HC RX 258: Performed by: STUDENT IN AN ORGANIZED HEALTH CARE EDUCATION/TRAINING PROGRAM

## 2024-07-21 PROCEDURE — 71045 X-RAY EXAM CHEST 1 VIEW: CPT

## 2024-07-21 PROCEDURE — 96376 TX/PRO/DX INJ SAME DRUG ADON: CPT

## 2024-07-21 PROCEDURE — 6360000002 HC RX W HCPCS: Performed by: EMERGENCY MEDICINE

## 2024-07-21 PROCEDURE — 06HY33Z INSERTION OF INFUSION DEVICE INTO LOWER VEIN, PERCUTANEOUS APPROACH: ICD-10-PCS | Performed by: INTERNAL MEDICINE

## 2024-07-21 PROCEDURE — 96372 THER/PROPH/DIAG INJ SC/IM: CPT

## 2024-07-21 PROCEDURE — 6370000000 HC RX 637 (ALT 250 FOR IP): Performed by: STUDENT IN AN ORGANIZED HEALTH CARE EDUCATION/TRAINING PROGRAM

## 2024-07-21 PROCEDURE — 6370000000 HC RX 637 (ALT 250 FOR IP): Performed by: EMERGENCY MEDICINE

## 2024-07-21 PROCEDURE — 36415 COLL VENOUS BLD VENIPUNCTURE: CPT

## 2024-07-21 RX ORDER — HYDRALAZINE HYDROCHLORIDE 20 MG/ML
5 INJECTION INTRAMUSCULAR; INTRAVENOUS EVERY 4 HOURS PRN
Status: DISCONTINUED | OUTPATIENT
Start: 2024-07-21 | End: 2024-07-23 | Stop reason: HOSPADM

## 2024-07-21 RX ORDER — CYCLOBENZAPRINE HCL 10 MG
5 TABLET ORAL 3 TIMES DAILY PRN
Status: DISCONTINUED | OUTPATIENT
Start: 2024-07-21 | End: 2024-07-23 | Stop reason: HOSPADM

## 2024-07-21 RX ORDER — SODIUM CHLORIDE 9 MG/ML
INJECTION, SOLUTION INTRAVENOUS PRN
Status: DISCONTINUED | OUTPATIENT
Start: 2024-07-21 | End: 2024-07-23 | Stop reason: HOSPADM

## 2024-07-21 RX ORDER — MORPHINE SULFATE 4 MG/ML
4 INJECTION, SOLUTION INTRAMUSCULAR; INTRAVENOUS ONCE
Status: DISCONTINUED | OUTPATIENT
Start: 2024-07-21 | End: 2024-07-21

## 2024-07-21 RX ORDER — INSULIN GLARGINE 100 [IU]/ML
8 INJECTION, SOLUTION SUBCUTANEOUS NIGHTLY
Status: DISCONTINUED | OUTPATIENT
Start: 2024-07-21 | End: 2024-07-23 | Stop reason: HOSPADM

## 2024-07-21 RX ORDER — INSULIN LISPRO 100 [IU]/ML
4 INJECTION, SOLUTION INTRAVENOUS; SUBCUTANEOUS
Status: DISCONTINUED | OUTPATIENT
Start: 2024-07-22 | End: 2024-07-23 | Stop reason: HOSPADM

## 2024-07-21 RX ORDER — ACETAMINOPHEN 650 MG/1
650 SUPPOSITORY RECTAL EVERY 6 HOURS PRN
Status: DISCONTINUED | OUTPATIENT
Start: 2024-07-21 | End: 2024-07-21

## 2024-07-21 RX ORDER — ONDANSETRON 2 MG/ML
4 INJECTION INTRAMUSCULAR; INTRAVENOUS ONCE
Status: DISCONTINUED | OUTPATIENT
Start: 2024-07-21 | End: 2024-07-21

## 2024-07-21 RX ORDER — ENOXAPARIN SODIUM 100 MG/ML
40 INJECTION SUBCUTANEOUS DAILY
Status: DISCONTINUED | OUTPATIENT
Start: 2024-07-22 | End: 2024-07-21 | Stop reason: ALTCHOICE

## 2024-07-21 RX ORDER — DULOXETIN HYDROCHLORIDE 60 MG/1
60 CAPSULE, DELAYED RELEASE ORAL DAILY
Status: DISCONTINUED | OUTPATIENT
Start: 2024-07-22 | End: 2024-07-23 | Stop reason: HOSPADM

## 2024-07-21 RX ORDER — HEPARIN SODIUM 5000 [USP'U]/ML
5000 INJECTION, SOLUTION INTRAVENOUS; SUBCUTANEOUS 2 TIMES DAILY
Status: DISCONTINUED | OUTPATIENT
Start: 2024-07-21 | End: 2024-07-23 | Stop reason: HOSPADM

## 2024-07-21 RX ORDER — MONTELUKAST SODIUM 10 MG/1
10 TABLET ORAL NIGHTLY
Status: DISCONTINUED | OUTPATIENT
Start: 2024-07-21 | End: 2024-07-23 | Stop reason: HOSPADM

## 2024-07-21 RX ORDER — ACETAMINOPHEN 325 MG/1
650 TABLET ORAL EVERY 6 HOURS PRN
Status: DISCONTINUED | OUTPATIENT
Start: 2024-07-21 | End: 2024-07-21

## 2024-07-21 RX ORDER — TRAMADOL HYDROCHLORIDE 50 MG/1
50 TABLET ORAL EVERY 6 HOURS PRN
Status: DISCONTINUED | OUTPATIENT
Start: 2024-07-21 | End: 2024-07-23 | Stop reason: HOSPADM

## 2024-07-21 RX ORDER — RANOLAZINE 500 MG/1
1000 TABLET, EXTENDED RELEASE ORAL 2 TIMES DAILY
Status: DISCONTINUED | OUTPATIENT
Start: 2024-07-21 | End: 2024-07-21

## 2024-07-21 RX ORDER — ATORVASTATIN CALCIUM 80 MG/1
80 TABLET, FILM COATED ORAL NIGHTLY
Status: DISCONTINUED | OUTPATIENT
Start: 2024-07-21 | End: 2024-07-23 | Stop reason: HOSPADM

## 2024-07-21 RX ORDER — PANTOPRAZOLE SODIUM 40 MG/1
40 TABLET, DELAYED RELEASE ORAL
Status: DISCONTINUED | OUTPATIENT
Start: 2024-07-22 | End: 2024-07-23 | Stop reason: HOSPADM

## 2024-07-21 RX ORDER — GLUCAGON 1 MG/ML
1 KIT INJECTION PRN
Status: DISCONTINUED | OUTPATIENT
Start: 2024-07-21 | End: 2024-07-23 | Stop reason: HOSPADM

## 2024-07-21 RX ORDER — ASPIRIN 81 MG/1
81 TABLET, CHEWABLE ORAL DAILY
Status: DISCONTINUED | OUTPATIENT
Start: 2024-07-22 | End: 2024-07-23 | Stop reason: HOSPADM

## 2024-07-21 RX ORDER — ISOSORBIDE MONONITRATE 60 MG/1
60 TABLET, EXTENDED RELEASE ORAL 2 TIMES DAILY
Status: DISCONTINUED | OUTPATIENT
Start: 2024-07-21 | End: 2024-07-23 | Stop reason: HOSPADM

## 2024-07-21 RX ORDER — FERROUS SULFATE 325(65) MG
325 TABLET ORAL
Status: DISCONTINUED | OUTPATIENT
Start: 2024-07-22 | End: 2024-07-23 | Stop reason: HOSPADM

## 2024-07-21 RX ORDER — BUDESONIDE AND FORMOTEROL FUMARATE DIHYDRATE 160; 4.5 UG/1; UG/1
2 AEROSOL RESPIRATORY (INHALATION)
Status: DISCONTINUED | OUTPATIENT
Start: 2024-07-21 | End: 2024-07-23 | Stop reason: HOSPADM

## 2024-07-21 RX ORDER — POLYETHYLENE GLYCOL 3350 17 G/17G
17 POWDER, FOR SOLUTION ORAL DAILY PRN
Status: DISCONTINUED | OUTPATIENT
Start: 2024-07-21 | End: 2024-07-23 | Stop reason: HOSPADM

## 2024-07-21 RX ORDER — QUETIAPINE FUMARATE 25 MG/1
50 TABLET, FILM COATED ORAL NIGHTLY
Status: DISCONTINUED | OUTPATIENT
Start: 2024-07-21 | End: 2024-07-23 | Stop reason: HOSPADM

## 2024-07-21 RX ORDER — PREDNISONE 10 MG/1
10 TABLET ORAL DAILY
Status: DISCONTINUED | OUTPATIENT
Start: 2024-07-22 | End: 2024-07-23 | Stop reason: HOSPADM

## 2024-07-21 RX ORDER — DEXTROSE MONOHYDRATE 100 MG/ML
INJECTION, SOLUTION INTRAVENOUS CONTINUOUS PRN
Status: DISCONTINUED | OUTPATIENT
Start: 2024-07-21 | End: 2024-07-23 | Stop reason: HOSPADM

## 2024-07-21 RX ORDER — ONDANSETRON 2 MG/ML
4 INJECTION INTRAMUSCULAR; INTRAVENOUS EVERY 6 HOURS PRN
Status: DISCONTINUED | OUTPATIENT
Start: 2024-07-21 | End: 2024-07-23 | Stop reason: HOSPADM

## 2024-07-21 RX ORDER — METOPROLOL SUCCINATE 50 MG/1
50 TABLET, EXTENDED RELEASE ORAL 2 TIMES DAILY
Status: DISCONTINUED | OUTPATIENT
Start: 2024-07-21 | End: 2024-07-22

## 2024-07-21 RX ORDER — SODIUM CHLORIDE 0.9 % (FLUSH) 0.9 %
5-40 SYRINGE (ML) INJECTION PRN
Status: DISCONTINUED | OUTPATIENT
Start: 2024-07-21 | End: 2024-07-23 | Stop reason: HOSPADM

## 2024-07-21 RX ORDER — ACETAMINOPHEN 500 MG
1000 TABLET ORAL EVERY 8 HOURS SCHEDULED
Status: DISCONTINUED | OUTPATIENT
Start: 2024-07-21 | End: 2024-07-23 | Stop reason: HOSPADM

## 2024-07-21 RX ORDER — HYDRALAZINE HYDROCHLORIDE 20 MG/ML
10 INJECTION INTRAMUSCULAR; INTRAVENOUS ONCE
Status: COMPLETED | OUTPATIENT
Start: 2024-07-21 | End: 2024-07-21

## 2024-07-21 RX ORDER — RANOLAZINE 500 MG/1
500 TABLET, EXTENDED RELEASE ORAL 2 TIMES DAILY
Status: DISCONTINUED | OUTPATIENT
Start: 2024-07-21 | End: 2024-07-23 | Stop reason: HOSPADM

## 2024-07-21 RX ORDER — SODIUM CHLORIDE 0.9 % (FLUSH) 0.9 %
5-40 SYRINGE (ML) INJECTION EVERY 12 HOURS SCHEDULED
Status: DISCONTINUED | OUTPATIENT
Start: 2024-07-21 | End: 2024-07-23 | Stop reason: HOSPADM

## 2024-07-21 RX ORDER — AMLODIPINE BESYLATE 5 MG/1
5 TABLET ORAL DAILY
Status: DISCONTINUED | OUTPATIENT
Start: 2024-07-22 | End: 2024-07-23 | Stop reason: HOSPADM

## 2024-07-21 RX ORDER — BACITRACIN ZINC AND POLYMYXIN B SULFATE 500; 1000 [USP'U]/G; [USP'U]/G
OINTMENT TOPICAL ONCE
Status: COMPLETED | OUTPATIENT
Start: 2024-07-21 | End: 2024-07-21

## 2024-07-21 RX ORDER — MORPHINE SULFATE 4 MG/ML
4 INJECTION, SOLUTION INTRAMUSCULAR; INTRAVENOUS ONCE
Status: COMPLETED | OUTPATIENT
Start: 2024-07-21 | End: 2024-07-21

## 2024-07-21 RX ORDER — LEVETIRACETAM 500 MG/1
500 TABLET ORAL 2 TIMES DAILY
Status: DISCONTINUED | OUTPATIENT
Start: 2024-07-21 | End: 2024-07-23 | Stop reason: HOSPADM

## 2024-07-21 RX ORDER — LIDOCAINE 4 G/G
1 PATCH TOPICAL DAILY
Status: DISCONTINUED | OUTPATIENT
Start: 2024-07-21 | End: 2024-07-23 | Stop reason: HOSPADM

## 2024-07-21 RX ORDER — ONDANSETRON 4 MG/1
4 TABLET, ORALLY DISINTEGRATING ORAL EVERY 8 HOURS PRN
Status: DISCONTINUED | OUTPATIENT
Start: 2024-07-21 | End: 2024-07-23 | Stop reason: HOSPADM

## 2024-07-21 RX ORDER — HYDRALAZINE HYDROCHLORIDE 25 MG/1
25 TABLET, FILM COATED ORAL 3 TIMES DAILY
Status: DISCONTINUED | OUTPATIENT
Start: 2024-07-21 | End: 2024-07-23 | Stop reason: HOSPADM

## 2024-07-21 RX ORDER — AMITRIPTYLINE HYDROCHLORIDE 10 MG/1
40 TABLET, FILM COATED ORAL NIGHTLY
Status: DISCONTINUED | OUTPATIENT
Start: 2024-07-21 | End: 2024-07-23 | Stop reason: HOSPADM

## 2024-07-21 RX ORDER — ASPIRIN 81 MG/1
243 TABLET, CHEWABLE ORAL ONCE
Status: COMPLETED | OUTPATIENT
Start: 2024-07-21 | End: 2024-07-21

## 2024-07-21 RX ORDER — ONDANSETRON 4 MG/1
4 TABLET, ORALLY DISINTEGRATING ORAL ONCE
Status: COMPLETED | OUTPATIENT
Start: 2024-07-21 | End: 2024-07-21

## 2024-07-21 RX ADMIN — NITROGLYCERIN 1 INCH: 20 OINTMENT TOPICAL at 16:39

## 2024-07-21 RX ADMIN — INSULIN GLARGINE 8 UNITS: 100 INJECTION, SOLUTION SUBCUTANEOUS at 21:59

## 2024-07-21 RX ADMIN — TICAGRELOR 90 MG: 90 TABLET ORAL at 22:02

## 2024-07-21 RX ADMIN — MORPHINE SULFATE 4 MG: 4 INJECTION, SOLUTION INTRAMUSCULAR; INTRAVENOUS at 23:05

## 2024-07-21 RX ADMIN — LEVETIRACETAM 500 MG: 500 TABLET, FILM COATED ORAL at 21:54

## 2024-07-21 RX ADMIN — MONTELUKAST 10 MG: 10 TABLET, FILM COATED ORAL at 21:55

## 2024-07-21 RX ADMIN — HYDRALAZINE HYDROCHLORIDE 25 MG: 25 TABLET ORAL at 22:02

## 2024-07-21 RX ADMIN — NICARDIPINE HYDROCHLORIDE 2.5 MG/HR: 0.1 INJECTION, SOLUTION INTRAVENOUS at 18:43

## 2024-07-21 RX ADMIN — SODIUM CHLORIDE, PRESERVATIVE FREE 10 ML: 5 INJECTION INTRAVENOUS at 22:00

## 2024-07-21 RX ADMIN — MORPHINE SULFATE 4 MG: 4 INJECTION, SOLUTION INTRAMUSCULAR; INTRAVENOUS at 17:56

## 2024-07-21 RX ADMIN — ASPIRIN 243 MG: 81 TABLET, CHEWABLE ORAL at 16:39

## 2024-07-21 RX ADMIN — TRAMADOL HYDROCHLORIDE 50 MG: 50 TABLET ORAL at 21:53

## 2024-07-21 RX ADMIN — ISOSORBIDE MONONITRATE 60 MG: 60 TABLET, EXTENDED RELEASE ORAL at 21:56

## 2024-07-21 RX ADMIN — ONDANSETRON 4 MG: 4 TABLET, ORALLY DISINTEGRATING ORAL at 16:39

## 2024-07-21 RX ADMIN — HYDRALAZINE HYDROCHLORIDE 10 MG: 20 INJECTION, SOLUTION INTRAMUSCULAR; INTRAVENOUS at 17:51

## 2024-07-21 RX ADMIN — MORPHINE SULFATE 4 MG: 4 INJECTION, SOLUTION INTRAMUSCULAR; INTRAVENOUS at 16:39

## 2024-07-21 RX ADMIN — AMITRIPTYLINE HYDROCHLORIDE 40 MG: 10 TABLET, FILM COATED ORAL at 21:55

## 2024-07-21 RX ADMIN — HEPARIN SODIUM 5000 UNITS: 5000 INJECTION INTRAVENOUS; SUBCUTANEOUS at 22:01

## 2024-07-21 RX ADMIN — ATORVASTATIN CALCIUM 80 MG: 80 TABLET, FILM COATED ORAL at 21:55

## 2024-07-21 RX ADMIN — QUETIAPINE FUMARATE 50 MG: 25 TABLET ORAL at 21:56

## 2024-07-21 RX ADMIN — BACITRACIN ZINC AND POLYMYXIN B SULFATE: 500; 10000 OINTMENT TOPICAL at 17:51

## 2024-07-21 ASSESSMENT — PAIN SCALES - GENERAL
PAINLEVEL_OUTOF10: 7
PAINLEVEL_OUTOF10: 7
PAINLEVEL_OUTOF10: 8
PAINLEVEL_OUTOF10: 7
PAINLEVEL_OUTOF10: 6
PAINLEVEL_OUTOF10: 8
PAINLEVEL_OUTOF10: 5
PAINLEVEL_OUTOF10: 8
PAINLEVEL_OUTOF10: 7

## 2024-07-21 ASSESSMENT — PAIN - FUNCTIONAL ASSESSMENT
PAIN_FUNCTIONAL_ASSESSMENT: ACTIVITIES ARE NOT PREVENTED
PAIN_FUNCTIONAL_ASSESSMENT: 0-10
PAIN_FUNCTIONAL_ASSESSMENT: ACTIVITIES ARE NOT PREVENTED

## 2024-07-21 ASSESSMENT — PAIN DESCRIPTION - ORIENTATION
ORIENTATION: LEFT
ORIENTATION: MID
ORIENTATION: LEFT
ORIENTATION: LEFT

## 2024-07-21 ASSESSMENT — PAIN DESCRIPTION - LOCATION
LOCATION: CHEST;BREAST
LOCATION: BACK;CHEST
LOCATION: CHEST;ARM;SHOULDER
LOCATION: CHEST;BACK
LOCATION: ARM;CHEST;SHOULDER
LOCATION: CHEST;BACK
LOCATION: CHEST;BACK;ARM
LOCATION: ARM;CHEST;SHOULDER
LOCATION: CHEST;ARM

## 2024-07-21 ASSESSMENT — PAIN DESCRIPTION - FREQUENCY
FREQUENCY: CONTINUOUS

## 2024-07-21 ASSESSMENT — PAIN DESCRIPTION - DESCRIPTORS
DESCRIPTORS: SQUEEZING
DESCRIPTORS: HEAVINESS
DESCRIPTORS: HEAVINESS
DESCRIPTORS: TIGHTNESS
DESCRIPTORS: HEAVINESS
DESCRIPTORS: SQUEEZING
DESCRIPTORS: ACHING;DISCOMFORT;HEAVINESS
DESCRIPTORS: SQUEEZING

## 2024-07-21 ASSESSMENT — PAIN DESCRIPTION - PAIN TYPE
TYPE: ACUTE PAIN
TYPE: ACUTE PAIN

## 2024-07-21 NOTE — ED NOTES
[facility] at [facility] ED at this time for transport with cardene medication. All questions answered at this time

## 2024-07-21 NOTE — ED NOTES
Wound care provided to burn to L thigh that was present on arrival. Cream applied, with clean dry dressing. Pt tolerated well

## 2024-07-21 NOTE — ED PROVIDER NOTES
EMERGENCY DEPARTMENT PROVIDER NOTE    Patient Identification  Pt Name: Maren Meza  MRN: [id number]  Birthdate 1955  Date of evaluation: 7/21/2024  Provider: Steve Boyer DO  PCP: Marin Cardenas MD    Chief Complaint  Chest Pain (Pt into ED from home with c/o 8/10 Mid constant chest pain that radiates to her back and L arm that woke her out of sleep this morning. Pt took x1 nitro and zofran with minimal relief. Cardiac hx. A/ox4)      HPI  (History provided by patient)  This is a 68 y.o. female with pertinent past medical history of CAD, hypertension, diabetes, CHF, COPD who was brought in by family for chest pain.  Patient states she awoke from sleep this morning at 0830 with severe central left-sided chest pain.  Feels like pressure.  Radiates into her left arm.  Patient took nitroglycerin with some but minimal relief of her symptoms.  Associated with nausea.  She denies any fevers, chills, shortness of breath or abdominal pain.  Patient also reports burn to her left thigh after dropping hot oatmeal on her leg.      I have reviewed the following nursing documentation:  Allergies: Bee venom    Past medical history:   Past Medical History:   Diagnosis Date    Acid reflux     Anemia     Anxiety and depression     Arthritis     Asthma     Atrial fibrillation (HCC)     CAD (coronary artery disease) 12/3/2012    Cerebral artery occlusion with cerebral infarction ([facility])     TIA\"\"S--right sided weakness & headache    CHF (congestive heart failure) ([facility])     Chronic kidney disease--stage III     40% kidney function    COPD (chronic obstructive pulmonary disease) ([facility])     DM2 (diabetes mellitus, type 2) ([facility])     Dysarthria     Fibromyalgia 6/7/2016    Headache(784.0) 2/19/2013    Hemisensory loss     History of blood transfusion 11/2020    pt denies having transfusion reaction    Hyperlipidemia     Hypertension     IBS (irritable bowel syndrome)     Inferior vena cava occlusion ([facility])     Keratitis

## 2024-07-21 NOTE — ED NOTES
Bleeding observed underneath pt's central line dressing. Pt currently taking thinners daily. EDMD made aware, N.O to monitor at this time

## 2024-07-21 NOTE — ED NOTES
ED SBAR report provided to YOLETTE Dewey. Patient to be transported to Room 2104 via stretcher by  [place] .Patient transported with bedside cardiac monitor and with IV medications infusing. IV site clean, dry, and intact. MEWS score and pain assessed as 6/10 and documented. Patient's score on the ARGUETA Fall scale reviewed with receiving RN. Updated patient and family on plan of care.

## 2024-07-21 NOTE — ED NOTES
RN attempted to obtain PIV access without success. Pt tolerated well. Additional RN made aware, at bedside for attempt.

## 2024-07-21 NOTE — ED TRIAGE NOTES
Pt into ED from home with c/o 8/10 Mid constant chest pain that radiates to her back and L arm that woke her out of sleep this morning. Pt took x1 nitro and zofran with minimal relief. Cardiac hx. A/ox4.

## 2024-07-22 LAB
ANION GAP SERPL CALCULATED.3IONS-SCNC: 12 MMOL/L (ref 3–16)
BASOPHILS # BLD: 0 K/UL (ref 0–0.2)
BASOPHILS NFR BLD: 0.6 %
BUN SERPL-MCNC: 45 MG/DL (ref 7–20)
CALCIUM SERPL-MCNC: 9.7 MG/DL (ref 8.3–10.6)
CHLORIDE SERPL-SCNC: 103 MMOL/L (ref 99–110)
CO2 SERPL-SCNC: 21 MMOL/L (ref 21–32)
CREAT SERPL-MCNC: 1.8 MG/DL (ref 0.6–1.2)
DEPRECATED RDW RBC AUTO: 17.2 % (ref 12.4–15.4)
EKG ATRIAL RATE: 55 BPM
EKG ATRIAL RATE: 68 BPM
EKG DIAGNOSIS: NORMAL
EKG DIAGNOSIS: NORMAL
EKG P-R INTERVAL: 144 MS
EKG P-R INTERVAL: 92 MS
EKG Q-T INTERVAL: 434 MS
EKG Q-T INTERVAL: 474 MS
EKG QRS DURATION: 86 MS
EKG QRS DURATION: 92 MS
EKG QTC CALCULATION (BAZETT): 453 MS
EKG QTC CALCULATION (BAZETT): 461 MS
EKG R AXIS: 16 DEGREES
EKG R AXIS: 42 DEGREES
EKG T AXIS: 72 DEGREES
EKG T AXIS: 89 DEGREES
EKG VENTRICULAR RATE: 55 BPM
EKG VENTRICULAR RATE: 68 BPM
EOSINOPHIL # BLD: 0 K/UL (ref 0–0.6)
EOSINOPHIL NFR BLD: 0.5 %
GFR SERPLBLD CREATININE-BSD FMLA CKD-EPI: 30 ML/MIN/{1.73_M2}
GLUCOSE BLD-MCNC: 114 MG/DL (ref 70–99)
GLUCOSE BLD-MCNC: 118 MG/DL (ref 70–99)
GLUCOSE BLD-MCNC: 271 MG/DL (ref 70–99)
GLUCOSE BLD-MCNC: 314 MG/DL (ref 70–99)
GLUCOSE SERPL-MCNC: 137 MG/DL (ref 70–99)
HCT VFR BLD AUTO: 28.3 % (ref 36–48)
HGB BLD-MCNC: 9.2 G/DL (ref 12–16)
LYMPHOCYTES # BLD: 1.3 K/UL (ref 1–5.1)
LYMPHOCYTES NFR BLD: 21.8 %
MCH RBC QN AUTO: 29.6 PG (ref 26–34)
MCHC RBC AUTO-ENTMCNC: 32.6 G/DL (ref 31–36)
MCV RBC AUTO: 90.9 FL (ref 80–100)
MONOCYTES # BLD: 0.3 K/UL (ref 0–1.3)
MONOCYTES NFR BLD: 5.7 %
NEUTROPHILS # BLD: 4.2 K/UL (ref 1.7–7.7)
NEUTROPHILS NFR BLD: 71.4 %
PERFORMED ON: ABNORMAL
PLATELET # BLD AUTO: 271 K/UL (ref 135–450)
PMV BLD AUTO: 7.8 FL (ref 5–10.5)
POTASSIUM SERPL-SCNC: 4.4 MMOL/L (ref 3.5–5.1)
RBC # BLD AUTO: 3.11 M/UL (ref 4–5.2)
SODIUM SERPL-SCNC: 136 MMOL/L (ref 136–145)
WBC # BLD AUTO: 5.8 K/UL (ref 4–11)

## 2024-07-22 PROCEDURE — 80048 BASIC METABOLIC PNL TOTAL CA: CPT

## 2024-07-22 PROCEDURE — 6370000000 HC RX 637 (ALT 250 FOR IP): Performed by: INTERNAL MEDICINE

## 2024-07-22 PROCEDURE — 94640 AIRWAY INHALATION TREATMENT: CPT

## 2024-07-22 PROCEDURE — 6360000002 HC RX W HCPCS: Performed by: INTERNAL MEDICINE

## 2024-07-22 PROCEDURE — 99222 1ST HOSP IP/OBS MODERATE 55: CPT | Performed by: NURSE PRACTITIONER

## 2024-07-22 PROCEDURE — 2500000003 HC RX 250 WO HCPCS: Performed by: INTERNAL MEDICINE

## 2024-07-22 PROCEDURE — 36569 INSJ PICC 5 YR+ W/O IMAGING: CPT

## 2024-07-22 PROCEDURE — 85025 COMPLETE CBC W/AUTO DIFF WBC: CPT

## 2024-07-22 PROCEDURE — 6360000002 HC RX W HCPCS: Performed by: STUDENT IN AN ORGANIZED HEALTH CARE EDUCATION/TRAINING PROGRAM

## 2024-07-22 PROCEDURE — 2580000003 HC RX 258: Performed by: STUDENT IN AN ORGANIZED HEALTH CARE EDUCATION/TRAINING PROGRAM

## 2024-07-22 PROCEDURE — 6370000000 HC RX 637 (ALT 250 FOR IP): Performed by: STUDENT IN AN ORGANIZED HEALTH CARE EDUCATION/TRAINING PROGRAM

## 2024-07-22 PROCEDURE — 2580000003 HC RX 258: Performed by: INTERNAL MEDICINE

## 2024-07-22 PROCEDURE — 6370000000 HC RX 637 (ALT 250 FOR IP): Performed by: NURSE PRACTITIONER

## 2024-07-22 PROCEDURE — 36592 COLLECT BLOOD FROM PICC: CPT

## 2024-07-22 PROCEDURE — 93005 ELECTROCARDIOGRAM TRACING: CPT | Performed by: STUDENT IN AN ORGANIZED HEALTH CARE EDUCATION/TRAINING PROGRAM

## 2024-07-22 PROCEDURE — 6370000000 HC RX 637 (ALT 250 FOR IP): Performed by: HOSPITALIST

## 2024-07-22 PROCEDURE — C1751 CATH, INF, PER/CENT/MIDLINE: HCPCS

## 2024-07-22 PROCEDURE — 93010 ELECTROCARDIOGRAM REPORT: CPT | Performed by: INTERNAL MEDICINE

## 2024-07-22 PROCEDURE — 1200000000 HC SEMI PRIVATE

## 2024-07-22 PROCEDURE — 05HB33Z INSERTION OF INFUSION DEVICE INTO RIGHT BASILIC VEIN, PERCUTANEOUS APPROACH: ICD-10-PCS | Performed by: INTERNAL MEDICINE

## 2024-07-22 PROCEDURE — 94760 N-INVAS EAR/PLS OXIMETRY 1: CPT

## 2024-07-22 RX ORDER — METOPROLOL SUCCINATE 50 MG/1
50 TABLET, EXTENDED RELEASE ORAL DAILY
Status: DISCONTINUED | OUTPATIENT
Start: 2024-07-23 | End: 2024-07-23 | Stop reason: HOSPADM

## 2024-07-22 RX ORDER — LIDOCAINE HYDROCHLORIDE 10 MG/ML
50 INJECTION, SOLUTION EPIDURAL; INFILTRATION; INTRACAUDAL; PERINEURAL ONCE
Status: COMPLETED | OUTPATIENT
Start: 2024-07-22 | End: 2024-07-22

## 2024-07-22 RX ORDER — SODIUM CHLORIDE 0.9 % (FLUSH) 0.9 %
5-40 SYRINGE (ML) INJECTION EVERY 12 HOURS SCHEDULED
Status: DISCONTINUED | OUTPATIENT
Start: 2024-07-22 | End: 2024-07-23 | Stop reason: HOSPADM

## 2024-07-22 RX ADMIN — FERROUS SULFATE TAB 325 MG (65 MG ELEMENTAL FE) 325 MG: 325 (65 FE) TAB at 09:09

## 2024-07-22 RX ADMIN — QUETIAPINE FUMARATE 50 MG: 25 TABLET ORAL at 21:04

## 2024-07-22 RX ADMIN — INSULIN LISPRO 4 UNITS: 100 INJECTION, SOLUTION INTRAVENOUS; SUBCUTANEOUS at 16:29

## 2024-07-22 RX ADMIN — HYDRALAZINE HYDROCHLORIDE 25 MG: 25 TABLET ORAL at 14:31

## 2024-07-22 RX ADMIN — MONTELUKAST 10 MG: 10 TABLET, FILM COATED ORAL at 21:02

## 2024-07-22 RX ADMIN — RANOLAZINE 500 MG: 500 TABLET, FILM COATED, EXTENDED RELEASE ORAL at 09:07

## 2024-07-22 RX ADMIN — HEPARIN SODIUM 5000 UNITS: 5000 INJECTION INTRAVENOUS; SUBCUTANEOUS at 10:50

## 2024-07-22 RX ADMIN — DULOXETINE HYDROCHLORIDE 60 MG: 60 CAPSULE, DELAYED RELEASE ORAL at 09:08

## 2024-07-22 RX ADMIN — AMITRIPTYLINE HYDROCHLORIDE 40 MG: 10 TABLET, FILM COATED ORAL at 21:04

## 2024-07-22 RX ADMIN — LEVETIRACETAM 500 MG: 500 TABLET, FILM COATED ORAL at 09:11

## 2024-07-22 RX ADMIN — RANOLAZINE 500 MG: 500 TABLET, FILM COATED, EXTENDED RELEASE ORAL at 00:23

## 2024-07-22 RX ADMIN — ACETAMINOPHEN 1000 MG: 500 TABLET ORAL at 06:41

## 2024-07-22 RX ADMIN — HEPARIN SODIUM 5000 UNITS: 5000 INJECTION INTRAVENOUS; SUBCUTANEOUS at 21:02

## 2024-07-22 RX ADMIN — ACETAMINOPHEN 1000 MG: 500 TABLET ORAL at 21:02

## 2024-07-22 RX ADMIN — HYDRALAZINE HYDROCHLORIDE 25 MG: 25 TABLET ORAL at 21:03

## 2024-07-22 RX ADMIN — ATORVASTATIN CALCIUM 80 MG: 80 TABLET, FILM COATED ORAL at 21:03

## 2024-07-22 RX ADMIN — SODIUM CHLORIDE, PRESERVATIVE FREE 10 ML: 5 INJECTION INTRAVENOUS at 21:02

## 2024-07-22 RX ADMIN — ISOSORBIDE MONONITRATE 60 MG: 60 TABLET, EXTENDED RELEASE ORAL at 09:07

## 2024-07-22 RX ADMIN — RANOLAZINE 500 MG: 500 TABLET, FILM COATED, EXTENDED RELEASE ORAL at 21:02

## 2024-07-22 RX ADMIN — PANTOPRAZOLE SODIUM 40 MG: 40 TABLET, DELAYED RELEASE ORAL at 06:41

## 2024-07-22 RX ADMIN — TRAMADOL HYDROCHLORIDE 50 MG: 50 TABLET ORAL at 04:04

## 2024-07-22 RX ADMIN — BUDESONIDE AND FORMOTEROL FUMARATE DIHYDRATE 2 PUFF: 160; 4.5 AEROSOL RESPIRATORY (INHALATION) at 07:30

## 2024-07-22 RX ADMIN — PREDNISONE 10 MG: 5 TABLET ORAL at 09:08

## 2024-07-22 RX ADMIN — INSULIN GLARGINE 8 UNITS: 100 INJECTION, SOLUTION SUBCUTANEOUS at 21:04

## 2024-07-22 RX ADMIN — LIDOCAINE HYDROCHLORIDE 50 MG: 10 INJECTION, SOLUTION EPIDURAL; INFILTRATION; INTRACAUDAL; PERINEURAL at 12:07

## 2024-07-22 RX ADMIN — ISOSORBIDE MONONITRATE 60 MG: 60 TABLET, EXTENDED RELEASE ORAL at 21:02

## 2024-07-22 RX ADMIN — ACETAMINOPHEN 1000 MG: 500 TABLET ORAL at 00:23

## 2024-07-22 RX ADMIN — TICAGRELOR 90 MG: 90 TABLET ORAL at 10:50

## 2024-07-22 RX ADMIN — TICAGRELOR 90 MG: 90 TABLET ORAL at 21:02

## 2024-07-22 RX ADMIN — ASPIRIN 81 MG: 81 TABLET, CHEWABLE ORAL at 09:09

## 2024-07-22 RX ADMIN — ACETAMINOPHEN 1000 MG: 500 TABLET ORAL at 14:32

## 2024-07-22 RX ADMIN — SODIUM CHLORIDE, PRESERVATIVE FREE 10 ML: 5 INJECTION INTRAVENOUS at 09:14

## 2024-07-22 RX ADMIN — LEVETIRACETAM 500 MG: 500 TABLET, FILM COATED ORAL at 21:04

## 2024-07-22 ASSESSMENT — PAIN DESCRIPTION - FREQUENCY
FREQUENCY: CONTINUOUS

## 2024-07-22 ASSESSMENT — PAIN DESCRIPTION - PAIN TYPE
TYPE: ACUTE PAIN

## 2024-07-22 ASSESSMENT — PAIN DESCRIPTION - ONSET
ONSET: ON-GOING
ONSET: AWAKENED FROM SLEEP
ONSET: ON-GOING

## 2024-07-22 ASSESSMENT — PAIN DESCRIPTION - LOCATION
LOCATION: CHEST
LOCATION: CHEST;ARM;SHOULDER
LOCATION: ARM;CHEST;SHOULDER
LOCATION: CHEST;ARM;SHOULDER
LOCATION: CHEST
LOCATION: ARM;CHEST

## 2024-07-22 ASSESSMENT — PAIN DESCRIPTION - ORIENTATION
ORIENTATION: MID
ORIENTATION: LEFT

## 2024-07-22 ASSESSMENT — PAIN SCALES - GENERAL
PAINLEVEL_OUTOF10: 5
PAINLEVEL_OUTOF10: 0
PAINLEVEL_OUTOF10: 7
PAINLEVEL_OUTOF10: 6
PAINLEVEL_OUTOF10: 4
PAINLEVEL_OUTOF10: 8
PAINLEVEL_OUTOF10: 5

## 2024-07-22 ASSESSMENT — PAIN DESCRIPTION - DIRECTION: RADIATING_TOWARDS: L ARM

## 2024-07-22 ASSESSMENT — PAIN DESCRIPTION - DESCRIPTORS
DESCRIPTORS: ACHING;POUNDING
DESCRIPTORS: SQUEEZING
DESCRIPTORS: ACHING
DESCRIPTORS: SQUEEZING
DESCRIPTORS: ACHING
DESCRIPTORS: SQUEEZING

## 2024-07-22 NOTE — H&P
(108 lb 3.9 oz)   Height:    1.524 m (5')       Medications Prior to Admission     Prior to Admission medications    Medication Sig Start Date End Date Taking? Authorizing Provider   omeprazole (PRILOSEC) 20 MG delayed release capsule TAKE 1 CAPSULE BY MOUTH DAILY 7/9/24   Marin Cardenas MD   amLODIPine (NORVASC) 5 MG tablet Take 1 tablet by mouth daily 7/4/24   Saqib Andrade MD   QUEtiapine (SEROQUEL) 50 MG tablet Take 1 tablet by mouth nightly    ProviderVania MD   hydrALAZINE (APRESOLINE) 25 MG tablet Take 1 tablet by mouth 3 times daily    Vania Baca MD   predniSONE (DELTASONE) 10 MG tablet Take 1 tablet by mouth daily    Vania Baca MD   insulin glargine (BASAGLAR KWIKPEN) 100 UNIT/ML injection pen Inject 8 Units into the skin nightly  Patient taking differently: Inject 8 Units into the skin 2 times daily 6/5/24   Paul Carney MD   acidophilus (LACTINEX/FLORANEX) Take 1 packet by mouth 2 times daily  Patient not taking: Reported on 6/27/2024 6/5/24   Paul Carney MD   metoprolol succinate (TOPROL XL) 50 MG extended release tablet Take 1 tablet by mouth in the morning and at bedtime    Vania Baca MD   DULoxetine (CYMBALTA) 60 MG extended release capsule Take 1 capsule by mouth daily    Vania Baca MD   atorvastatin (LIPITOR) 80 MG tablet TAKE 1 TABLET BY MOUTH NIGHTLY 4/12/24   Marin Cardenas MD   levETIRAcetam (KEPPRA) 500 MG tablet Take 1 tablet by mouth 2 times daily 4/3/24   Eneida Burger MD   midodrine (PROAMATINE) 5 MG tablet Take 1 tablet by mouth 3 times daily (with meals)  Patient taking differently: Take 1 tablet by mouth 3 times daily as needed (Low SBP.) 4/3/24   Eneida Burger MD   isosorbide mononitrate (IMDUR) 60 MG extended release tablet Take 1 tablet by mouth in the morning and at bedtime    Vania Baca MD   Insulin Aspart (NOVOLOG FLEXPEN SC) Inject 4 Units into the skin 3 times daily (with meals)     not smoked since hospitalization - kh   Vaping Use    Vaping Use: Never used   Substance and Sexual Activity    Alcohol use: No     Alcohol/week: 0.0 standard drinks of alcohol    Drug use: Never    Sexual activity: Not Currently     Partners: Male     Social Determinants of Health     Financial Resource Strain: Low Risk  (3/7/2023)    Overall Financial Resource Strain (CARDIA)     Difficulty of Paying Living Expenses: Not very hard   Food Insecurity: Food Insecurity Present (6/28/2024)    Hunger Vital Sign     Worried About Running Out of Food in the Last Year: Never true     Ran Out of Food in the Last Year: Sometimes true   Transportation Needs: No Transportation Needs (6/28/2024)    PRAPARE - Transportation     Lack of Transportation (Medical): No     Lack of Transportation (Non-Medical): No   Physical Activity: Insufficiently Active (5/25/2022)    Exercise Vital Sign     Days of Exercise per Week: 7 days     Minutes of Exercise per Session: 10 min   Housing Stability: High Risk (6/28/2024)    Housing Stability Vital Sign     Unable to Pay for Housing in the Last Year: Yes     Number of Places Lived in the Last Year: 1     Unstable Housing in the Last Year: No       Medications:   Medications:    Infusions:    niCARdipine 1.5 mg/hr (07/21/24 1934)     PRN Meds:      Labs      CBC:   Recent Labs     07/21/24  1717   WBC 6.7   HGB 9.4*        BMP:    Recent Labs     07/21/24  1717      K 4.9      CO2 23   BUN 43*   CREATININE 1.8*   GLUCOSE 213*     Hepatic:   Recent Labs     07/21/24  1717   AST 14*   ALT 9*   BILITOT <0.2   ALKPHOS 122     Lipids:   Lab Results   Component Value Date/Time    CHOL 203 06/28/2024 04:30 AM    HDL 45 06/28/2024 04:30 AM    HDL 58 05/14/2012 03:27 PM    TRIG 186 06/28/2024 04:30 AM     Hemoglobin A1C:   Lab Results   Component Value Date/Time    LABA1C 7.6 06/04/2024 11:46 AM     TSH:   Lab Results   Component Value Date/Time    TSH 1.01 03/29/2024 05:30 AM

## 2024-07-22 NOTE — PLAN OF CARE
Problem: Pain  Goal: Verbalizes/displays adequate comfort level or baseline comfort level  7/22/2024 1058 by Dario Negro, RN  Outcome: Progressing  Flowsheets (Taken 7/22/2024 0746)  Verbalizes/displays adequate comfort level or baseline comfort level:   Encourage patient to monitor pain and request assistance   Assess pain using appropriate pain scale   Administer analgesics based on type and severity of pain and evaluate response   Implement non-pharmacological measures as appropriate and evaluate response   Consider cultural and social influences on pain and pain management   Notify Licensed Independent Practitioner if interventions unsuccessful or patient reports new pain     Problem: Discharge Planning  Goal: Discharge to home or other facility with appropriate resources  7/22/2024 1058 by Dario Negro, RN  Outcome: Progressing  Flowsheets (Taken 7/22/2024 0746)  Discharge to home or other facility with appropriate resources:   Identify barriers to discharge with patient and caregiver   Arrange for needed discharge resources and transportation as appropriate

## 2024-07-22 NOTE — PROGRESS NOTES
Transfer report received, RN called 4N RN report. All questions answered. RN will transfer patient with belongings via wheelchair. Patient has R midline, VSS on RA, and was updated on transfer.

## 2024-07-22 NOTE — PROGRESS NOTES
Arrived to place midline with bedside RN Dario . Pre-procedure and timeout done with RN, discussed limitations of placement and allergies. Vital signs stable. Labs, allergies, medications, and code status reviewed. No contraindications noted.    Procedure explained to pt, including the risk and benefits of the procedure. All questions answered. Pt verbalizes understanding of the procedure and states no more questions.     Pt's basilic, brachial, cephalic are all easily collapsible with no indication for a clot. Vein selected is large enough for catheter (please see image below). Pt tolerated sterile procedure well, with no difficulty accessing right basilic vein, when accessed - blood was free flowing and non-pulsatile. Guidewire, introducer, and catheter went in smoothly. ML placement verification with blood return and flushes easily.      Nurses:  OK to use Midline.    Please replace all existing IV tubing with new IV tubing prior to using the ML for current IV infusions.  Please remove any PIVs from ML arm.  All of the above may be sources of infection or an increase chance of a clot.      Post procedure - reorganized pt table, placed pt in lowest position, with call light and educated on line care. Instructed pt/RN not to use arm for at least 30min to avoid bleeding. Reported off to bedside RN.      If you have any questions please call number below and dispatch will direct you to the PICC RN that is on call.    [phone]

## 2024-07-22 NOTE — PROGRESS NOTES
Hospitalist Progress Note      PCP: Marin Cardenas MD    Date of Admission: 7/21/2024    Subjective: off cardene gtt since last night 10pm, BP lower this am, holding BP meds, still feeling fatigued    Medications:  Reviewed    Infusion Medications    sodium chloride      dextrose       Scheduled Medications    sodium chloride flush  5-40 mL IntraVENous 2 times per day    [START ON 7/23/2024] metoprolol succinate  50 mg Oral Daily    amLODIPine  5 mg Oral Daily    aspirin  81 mg Oral Daily    atorvastatin  80 mg Oral Nightly    amitriptyline  40 mg Oral Nightly    DULoxetine  60 mg Oral Daily    ferrous sulfate  325 mg Oral Daily with breakfast    hydrALAZINE  25 mg Oral TID    budesonide-formoterol  2 puff Inhalation BID RT    insulin glargine  8 Units SubCUTAneous Nightly    isosorbide mononitrate  60 mg Oral BID    levETIRAcetam  500 mg Oral BID    linaclotide  290 mcg Oral QAM AC    montelukast  10 mg Oral Nightly    pantoprazole  40 mg Oral QAM AC    predniSONE  10 mg Oral Daily    QUEtiapine  50 mg Oral Nightly    ticagrelor  90 mg Oral BID    sodium chloride flush  5-40 mL IntraVENous 2 times per day    heparin (porcine)  5,000 Units SubCUTAneous BID    insulin lispro  4 Units SubCUTAneous TID WC    ranolazine  500 mg Oral BID    lidocaine  1 patch TransDERmal Daily    acetaminophen  1,000 mg Oral 3 times per day     PRN Meds: cyclobenzaprine, traMADol, sodium chloride flush, sodium chloride, ondansetron **OR** ondansetron, polyethylene glycol, hydrALAZINE, glucose, dextrose bolus **OR** dextrose bolus, glucagon (rDNA), dextrose      Intake/Output Summary (Last 24 hours) at 7/22/2024 1455  Last data filed at 7/22/2024 1200  Gross per 24 hour   Intake 163.6 ml   Output --   Net 163.6 ml       Physical Exam Performed:    BP (!) 116/53   Pulse 50   Temp 97.8 °F (36.6 °C) (Oral)   Resp 13   Ht 1.524 m (5')   Wt 49.3 kg (108 lb 11 oz)   SpO2 100%   BMI 21.23 kg/m²        General: NAD  Eyes:  EOMI  ENT: neck supple  Cardiovascular: Regular rate.  Respiratory: Clear to auscultation  Gastrointestinal: Soft, non tender  Genitourinary: no suprapubic tenderness  Musculoskeletal: No edema  Skin: warm, dry  Neuro: Alert.  Psych: Mood appropriate.       Labs:   Recent Labs     07/21/24 1717 07/22/24  0420   WBC 6.7 5.8   HGB 9.4* 9.2*   HCT 27.6* 28.3*    271     Recent Labs     07/21/24 1717 07/22/24  0420    136   K 4.9 4.4    103   CO2 23 21   BUN 43* 45*   CREATININE 1.8* 1.8*   CALCIUM 9.2 9.7     Recent Labs     07/21/24 1717   AST 14*   ALT 9*   BILITOT <0.2   ALKPHOS 122     No results for input(s): \"INR\" in the last 72 hours.  Recent Labs     07/21/24 1717 07/21/24  1800   TROPHS 34* 33*       Urinalysis:      Lab Results   Component Value Date/Time    NITRU Negative 06/28/2024 04:40 PM    WBCUA 4 06/28/2024 04:40 PM    BACTERIA 2+ 06/28/2024 04:40 PM    RBCUA 0 06/28/2024 04:40 PM    BLOODU Negative 06/28/2024 04:40 PM    GLUCOSEU Negative 06/28/2024 04:40 PM    GLUCOSEU NEGATIVE 05/14/2012 03:29 PM       Radiology:  XR CHEST PORTABLE   Final Result   Few is chronic lung changes with mild subsegmental linear atelectasis vs   scarring along the right lung base which is more apparent.             IP CONSULT TO HOSPITALIST  IP CONSULT TO CARDIOLOGY  IP CONSULT TO CARDIOLOGY  IP CONSULT TO VASCULAR ACCESS TEAM    Assessment/Plan:    Active Hospital Problems    Diagnosis     Hypertensive emergency [I16.1]     Type 2 diabetes mellitus with mild nonproliferative diabetic retinopathy without macular edema, bilateral ([facility]) [E11.3293]     Coronary stent restenosis due to progression of disease [T82.855A, I25.10]     CKD (chronic kidney disease) stage 3, GFR 30-59 ml/min ([facility]) [N18.30]     Hypertensive heart and chronic kidney disease with heart failure and stage 1 through stage 4 chronic kidney disease, or unspecified chronic kidney disease (HCC) [I13.0]     Type 2 diabetes mellitus with

## 2024-07-22 NOTE — PROGRESS NOTES
4 Eyes Skin Assessment     NAME:  Maren Meza  YOB: 1955  MEDICAL RECORD NUMBER:  [id number]    The patient is being assessed for  Admission    I agree that at least one RN has performed a thorough Head to Toe Skin Assessment on the patient. ALL assessment sites listed below have been assessed.      Areas assessed by both nurses:    Head, Face, Ears, Shoulders, Back, Chest, Arms, Elbows, Hands, Sacrum. Buttock, Coccyx, Ischium, Legs. Feet and Heels, and Under Medical Devices         Does the Patient have a Wound? No noted wound(s) old burn left thigh/hip, dry flaky skin         Rakan Prevention initiated by RN: Yes  Wound Care Orders initiated by RN: No    Pressure Injury (Stage 3,4, Unstageable, DTI, NWPT, and Complex wounds) if present, place Wound referral order by RN under : No    New Ostomies, if present place, Ostomy referral order under : No     Nurse 1 eSignature: Electronically signed by Angie Morrison RN on 7/22/24 at 1:51 AM EDT    **SHARE this note so that the co-signing nurse can place an eSignature**    Nurse 2 eSignature: Electronically signed by Denice Olivarez RN on 7/22/24 at 6:00 AM EDT

## 2024-07-22 NOTE — CARE COORDINATION
Case Management Assessment  Initial Evaluation    Date/Time of Evaluation: 7/22/2024 5:46 PM  Assessment Completed by: Salvador Herrera RN    If patient is discharged prior to next notation, then this note serves as note for discharge by case management.    Patient Name: Maren Meza                   YOB: 1955  Diagnosis: Accelerated hypertension [I10]  Elevated troponin [R79.89]  Acute chest pain [R07.9]  Hypertensive urgency [I16.0]  Chronic renal impairment, unspecified CKD stage [N18.9]                   Date / Time: 7/21/2024  3:07 PM    Patient Admission Status: Inpatient   Readmission Risk (Low < 19, Mod (19-27), High > 27): Readmission Risk Score: 31.7    Current PCP: Marin Cardenas MD  PCP verified by CM? Yes    Chart Reviewed: Yes      History Provided by: Patient  Patient Orientation: Alert and Oriented    Patient Cognition: Alert    Hospitalization in the last 30 days (Readmission):  Yes    If yes, Readmission Assessment in CM Navigator will be completed.    Advance Directives:      Code Status: Full Code   Patient's Primary Decision Maker is: Named in Scanned ACP Document    Primary Decision Maker: Neida Fisher - Child - [phone]    Secondary Decision Maker: Ester Jenkins - Child - [phone]    Secondary Decision Maker: Nilesh Meza - Child - [phone]    Discharge Planning:    Patient lives with: Children Type of Home: Apartment  Primary Care Giver: Other (Comment) ([employer] Aide)  Patient Support Systems include: Children, Family Members, RYAN/Passport   Current Financial resources: Medicare, Medicaid  Current community resources: ECF/Home Care  Current services prior to admission: Home Care, Durable Medical Equipment, RYAN/Passport, Meals On Wheels            Current DME: Walker            Type of Home Care services:  OT, PT, Nursing Services, Meals on Wheels, Aide Services    ADLS  Prior functional level: Assistance with the following:, Bathing, Cooking, Housework,  Shopping  Current functional level: Housework, Cooking, Shopping, Assistance with the following:    PT AM-PAC:   /24  OT AM-PAC:   /24    Family can provide assistance at DC: Yes  Would you like Case Management to discuss the discharge plan with any other family members/significant others, and if so, who? No  Plans to Return to Present Housing: Yes  Other Identified Issues/Barriers to RETURNING to current housing: None  Potential Assistance needed at discharge: N/A            Potential DME:  N/A  Patient expects to discharge to: Apartment  Plan for transportation at discharge: Family    Financial    Payor: [employer] / Plan: [employer] DUAL / Product Type: *No Product type* /     Does insurance require precert for SNF: Yes    Potential assistance Purchasing Medications: No  Meds-to-Beds request:        [facility] [address] [place] [address] -  [phone] - F [phone]  [address] [place] [address]  Phone: [phone] Fax: [phone]    [facility] [address] [facility] [phone] - F [phone]  [address] [facility] [address]  Phone: [phone] Fax: [phone]    [facility] [address] [phone] - F [phone]  [address] [facility] [address]  Phone: [phone] Fax: [phone]    [id number] [place], [place] - [address] - P [phone] - F [phone]  [address]  [facility] [address]  Phone: [phone] Fax: [phone]      Notes:    Factors facilitating achievement of predicted outcomes: Family support and Caregiver support    Barriers to discharge: None    Additional Case Management Notes: Discharge plan is to return to home with current services. Active with [facility] for PT/OT/skilled nursing. Active with the [facility] Waiver program for Aide ([facility]) 5 days/week 5 hrs/day, MOW. Family will provide

## 2024-07-22 NOTE — PROGRESS NOTES
NAME:  Maren Meza  YOB: 1955  MEDICAL RECORD NUMBER:  [id number]    Shift Summary: patient admitted from [facility] ER for HTN and chest pain on Cardene drip. Cardene turned off 10 pm and scheduled home meds given as per order. Patient complained of continuous chest pain, left side/ left arm and left shoulder- difficult to control. Morphine 4 mg IVP x 1 dose given in addition to prn Tramadol. Up to bathroom with CGA. Await Cardiology consult- keep npo after MN    Family updated: No    Rhythm: Normal Sinus Rhythm   / sinus kaden(asymptomatic)    Most recent vitals:   Visit Vitals  BP (!) 145/59   Pulse 53   Temp 97.3 °F (36.3 °C) (Oral)   Resp 10   Ht 1.524 m (5')   Wt 49.3 kg (108 lb 11 oz)   SpO2 99%   BMI 21.23 kg/m²           No data found.    No data found.      Respiratory support needed (if any):  - RA    Admission weight Weight - Scale: 49.1 kg (108 lb 3.9 oz) (07/21/24 2031)    Today's weight    Wt Readings from Last 1 Encounters:   07/22/24 49.3 kg (108 lb 11 oz)        Gill need assessed each shift: N/A - no gill present  UOP >30ml/hr: YES  Last documented BM (in last 48 hrs):  No data found.             Restraints (in use currently or dc'd in last 12 hrs): No    Order current and documentation up to date? No    Lines/Drains reviewed @ bedside.  CVC  07/21/24 Right Femoral (Active)   Number of days: 0         Drip rates at handoff:    niCARdipine Stopped (07/21/24 2204)    sodium chloride      dextrose         Lab Data:   CBC:   Recent Labs     07/21/24 1717 07/22/24  0420   WBC 6.7 5.8   HGB 9.4* 9.2*   HCT 27.6* 28.3*   MCV 91.1 90.9    271     BMP:    Recent Labs     07/21/24 1717 07/22/24  0420    136   K 4.9 4.4   CO2 23 21   BUN 43* 45*   CREATININE 1.8* 1.8*     LIVR:   Recent Labs     07/21/24 1717   AST 14*   ALT 9*     PT/INR: No results for input(s): \"INR\" in the last 72 hours.    Invalid input(s): \"PROT\"  APTT: No results for input(s): \"APTT\" in the last 72  hours.  ABG: No results for input(s): \"PHART\", \"XJD9QYS\", \"PO2ART\" in the last 72 hours.    Any consults during the shift? Yes: Consults received: : ok to call cardiology in am per Dr. Faustin    Any signed and held orders to be released?  No        4 Eyes Skin Assessment       The patient is being assessed for  Shift Handoff    I agree that at least one RN has performed a thorough Head to Toe Skin Assessment on the patient. ALL assessment sites listed below have been assessed.      Areas assessed by both nurses: Head, Face, Ears, Shoulders, Back, Chest, Arms, Elbows, Hands, Sacrum. Buttock, Coccyx, Ischium, Legs. Feet and Heels, and Under Medical Devices         Does the Patient have a Wound? No noted wound(s) healing burn left thigh/hip    Wound Care Orders initiated by RN: No       Rakan Prevention initiated by RN: Yes    Pressure Injury (Stage 3,4, Unstageable, DTI, NWPT, and Complex wounds) if present, place Wound referral order by RN under : No    New Ostomies, if present place, Ostomy referral order under : No     Nurse 1 eSignature: Electronically signed by Angie Morrison RN on 7/22/24 at 5:58 AM EDT    **SHARE this note so that the co-signing nurse can place an eSignature**    Nurse 2 eSignature: Electronically signed by Dario Mckinley RN on 7/22/24 at 7:18 AM EDT

## 2024-07-22 NOTE — CARE COORDINATION
07/22/24 1723   Readmission Assessment   Number of Days since last admission? 8-30 days   Previous Disposition Home with Home Health   Who is being Interviewed Patient   What was the patient's/caregiver's perception as to why they think they needed to return back to the hospital? Other (Comment)  (Chest pain)   Did you visit your Primary Care Physician after you left the hospital, before you returned this time? No   Why weren't you able to visit your PCP? Did not have an appointment   Did you see a specialist, such as Cardiac, Pulmonary, Orthopedic Physician, etc. after you left the hospital? No   Who advised the patient to return to the hospital? Self-referral   Does the patient report anything that got in the way of taking their medications? No   In our efforts to provide the best possible care to you and others like you, can you think of anything that we could have done to help you after you left the hospital the first time, so that you might not have needed to return so soon? Other (Comment)  (Nothing)     Salvador OSEGUERA RN  Case Management  [phone]    Electronically signed by Salvador Herrera RN on 7/22/2024 at 5:27 PM

## 2024-07-22 NOTE — PROGRESS NOTES
Late entry due to patient care   2030: patient admitted to ICU room 2104 from [facility]. Alert and oriented x 4 . Assisted patient from stretcher over into bed. VS stable, /55 (80) with Cardene drip infusing at 1.5 mg /hr . Oriented to room and new orders. Notified hospitalist Dr. Faustin of patients arrival. Slight bleeding noted to R femoral CVC.     2155: scheduled home meds given as per order with water. Chest pain continues 7/10 left side/arm and shoulder. Prn Tramadol given as advised per Dr. Faustin for chest pain.      2204: Cardene drip turned off after PO meds given as per order. /44. Bleeding to right fem CVC continues- dressing changed per charge YOLETTE Galdamez.     2305: patient c/o chest pain unchanged 7/10, continuous, squeezing pain that radiates to left arm and shoulder. Does not appear to be in distress otherwise. Updated NP Daiana Reid- new orders for 4mg Morphine IVP given and lidocaine patch to left chest. Patient updated.     0015: Right femoral CVC line continues to bleed. Dressing changed with Dstat, patient tolerated well. States chest pain feeling better, rates 5/10 and states she is going to try to sleep. Call light in reach. Will continue to monitor.

## 2024-07-22 NOTE — PLAN OF CARE
Problem: Pain  Goal: Verbalizes/displays adequate comfort level or baseline comfort level  Outcome: Progressing     Problem: Discharge Planning  Goal: Discharge to home or other facility with appropriate resources  Outcome: Progressing  Flowsheets  Taken 7/21/2024 2251  Discharge to home or other facility with appropriate resources:   Identify barriers to discharge with patient and caregiver   Identify discharge learning needs (meds, wound care, etc)  Taken 7/21/2024 2100  Discharge to home or other facility with appropriate resources:   Identify barriers to discharge with patient and caregiver   Arrange for needed discharge resources and transportation as appropriate   Identify discharge learning needs (meds, wound care, etc)   Refer to discharge planning if patient needs post-hospital services based on physician order or complex needs related to functional status, cognitive ability or social support system     Problem: Skin/Tissue Integrity  Goal: Absence of new skin breakdown  Description: 1.  Monitor for areas of redness and/or skin breakdown  2.  Assess vascular access sites hourly  3.  Every 4-6 hours minimum:  Change oxygen saturation probe site  4.  Every 4-6 hours:  If on nasal continuous positive airway pressure, respiratory therapy assess nares and determine need for appliance change or resting period.  Outcome: Progressing     Problem: Safety - Adult  Goal: Free from fall injury  Outcome: Progressing

## 2024-07-22 NOTE — PROGRESS NOTES
4 Eyes Skin Assessment     NAME:  Maren Meza  YOB: 1955  MEDICAL RECORD NUMBER:  [id number]    The patient is being assessed for  Transfer to New Unit    I agree that at least one RN has performed a thorough Head to Toe Skin Assessment on the patient. ALL assessment sites listed below have been assessed.      Areas assessed by both nurses:    Head, Face, Ears, Shoulders, Back, Chest, Arms, Elbows, Hands, Sacrum. Buttock, Coccyx, Ischium, and Legs. Feet and Heels        Does the Patient have a Wound? No noted wound(s)       Rakan Prevention initiated by RN: No  Wound Care Orders initiated by RN: No    Pressure Injury (Stage 3,4, Unstageable, DTI, NWPT, and Complex wounds) if present, place Wound referral order by RN under : No    New Ostomies, if present place, Ostomy referral order under : No     Nurse 1 eSignature: Electronically signed by Rogerio Ureña RN on 7/22/24 at 6:16 PM EDT    **SHARE this note so that the co-signing nurse can place an eSignature**    Nurse 2 eSignature: {Esignature:[id number]}

## 2024-07-22 NOTE — PROGRESS NOTES
Patient up to 4260 from ICU. Patient A&O x4, VSS.  Patient denies CP at this time.  Patient oriented to room and call light.  Fall precautions in place.  No further needs expressed at this time by patient.    Electronically signed by Rogerio Ureña RN on 7/22/24 at 5:08 PM EDT

## 2024-07-22 NOTE — PROGRESS NOTES
Cardiology Consultation   Date: 7/22/2024  Admit Date:  7/21/2024  Admission Diagnosis: Accelerated hypertension [I10]  Elevated troponin [R79.89]  Acute chest pain [R07.9]  Hypertensive urgency [I16.0]  Chronic renal impairment, unspecified CKD stage [N18.9]     Reason for Consultation: chest pain  Consult Requesting Physician: Eneida Burger MD       History of Present Illness  Maren Meza is a 68 y.o. year old female with past medical history significant for CAD/PCI to LAD, RCA, OM1- multiple LHC in past, chronic angina, DM2, HTN, HLP, CKD 3b, anemia, DHF, ICM. Former tobacco abuse.   Hx med non compliance. Follows w [facility]- last OV 5/21/2024.  Adm 6/27-7/4/2024 w GIB>cont on asa brilinta at [facility].     Adm to [facility] with Hypertensive Emergency   Initially seen at [facility] ED, started on nicardipine gtt  Associated with chest pain and nausea- NTG SL X2 at home did not relieve pain    Chest pain has resolved since BP controlled.   C/O occasional fleeting chest pain that lasts a few seconds  Denies SOB or N/V.     Tells me she has her home meds in pill packs and she has been compliant.      Past Medical History:   Diagnosis Date    Acid reflux     Anemia     Anxiety and depression     Arthritis     Asthma     Atrial fibrillation (HCC)     CAD (coronary artery disease) 12/3/2012    Cerebral artery occlusion with cerebral infarction (HCC)     TIA\"\"S--right sided weakness & headache    CHF (congestive heart failure) (HCC)     Chronic kidney disease--stage III     40% kidney function    COPD (chronic obstructive pulmonary disease) (HCC)     DM2 (diabetes mellitus, type 2) ([facility])     Dysarthria     Fibromyalgia 6/7/2016    Headache(784.0) 2/19/2013    Hemisensory loss     History of blood transfusion 11/2020    pt denies having transfusion reaction    Hyperlipidemia     Hypertension     IBS (irritable bowel syndrome)     Inferior vena cava occlusion (HCC)     Keratitis     Meningioma (HCC)     MI, old      TIMES DAILY 8/11/23   Fe Abrams APRN - CNP   fluticasone (FLONASE) 50 MCG/ACT nasal spray 1 spray by Each Nostril route daily 4/13/23   Marin Cardenas MD   ondansetron (ZOFRAN-ODT) 4 MG disintegrating tablet Take 1 tablet by mouth 3 times daily as needed for Nausea or Vomiting 3/15/23   Marin Cardenas MD   albuterol (PROVENTIL) (2.5 MG/3ML) 0.083% nebulizer solution TAKE 3 MLS BY NEBULIZATION EVERY 4 HOURS AS NEEDED FOR WHEEZING  Patient taking differently: Take 3 mLs by nebulization every 4 hours as needed for Wheezing 3/6/23   Marin Cardenas MD   albuterol sulfate HFA (PROVENTIL;VENTOLIN;PROAIR) 108 (90 Base) MCG/ACT inhaler Inhale 2 puffs into the lungs every 4 hours as needed for Wheezing or Shortness of Breath    Vania Baca MD   linaclotide (LINZESS) 290 MCG CAPS capsule Take 1 capsule by mouth every morning (before breakfast)    Vania Baca MD   hydrOXYzine HCl (ATARAX) 25 MG tablet Take 1 tablet by mouth 3 times daily as needed for Itching    Vania Baca MD   ferrous sulfate (IRON 325) 325 (65 Fe) MG tablet Take 1 tablet by mouth daily (with breakfast)    Vania Baca MD   amitriptyline (ELAVIL) 10 MG tablet Take 4 tablets by mouth nightly 12/14/22   Vania Baca MD   nitroGLYCERIN (NITROSTAT) 0.4 MG SL tablet Place 1 tablet under the tongue every 5 minutes as needed for Chest pain up to max of 3 total doses. If no relief after 1 dose, call 911. 12/8/22   Fe Abrams APRN - CNP   diclofenac sodium (VOLTAREN) 1 % GEL Apply 4 g topically 4 times daily  Patient taking differently: Apply 4 g topically 4 times daily as needed 12/7/22   Marin Cardenas MD   fexofenadine (ALLEGRA) 180 MG tablet Take 1 tablet by mouth daily as needed (Seasonal allergies.) 5/4/22   Vania Baca MD   montelukast (SINGULAIR) 10 MG tablet TAKE 1 TABLET BY MOUTH DAILY  Patient taking differently: Take 1 tablet by mouth nightly 10/18/22   Marin Cardenas

## 2024-07-23 VITALS
HEIGHT: 60 IN | TEMPERATURE: 97.8 F | HEART RATE: 68 BPM | SYSTOLIC BLOOD PRESSURE: 132 MMHG | OXYGEN SATURATION: 97 % | BODY MASS INDEX: 22.38 KG/M2 | DIASTOLIC BLOOD PRESSURE: 59 MMHG | WEIGHT: 113.98 LBS | RESPIRATION RATE: 17 BRPM

## 2024-07-23 LAB
GLUCOSE BLD-MCNC: 157 MG/DL (ref 70–99)
GLUCOSE BLD-MCNC: 222 MG/DL (ref 70–99)
PERFORMED ON: ABNORMAL
PERFORMED ON: ABNORMAL

## 2024-07-23 PROCEDURE — 2580000003 HC RX 258: Performed by: INTERNAL MEDICINE

## 2024-07-23 PROCEDURE — 94760 N-INVAS EAR/PLS OXIMETRY 1: CPT

## 2024-07-23 PROCEDURE — 6370000000 HC RX 637 (ALT 250 FOR IP): Performed by: INTERNAL MEDICINE

## 2024-07-23 PROCEDURE — 6360000002 HC RX W HCPCS: Performed by: INTERNAL MEDICINE

## 2024-07-23 RX ORDER — METOPROLOL SUCCINATE 50 MG/1
50 TABLET, EXTENDED RELEASE ORAL
Qty: 30 TABLET | Refills: 3 | Status: SHIPPED | OUTPATIENT
Start: 2024-07-23

## 2024-07-23 RX ORDER — LOSARTAN POTASSIUM 50 MG/1
50 TABLET ORAL
Qty: 30 TABLET | Refills: 3 | Status: SHIPPED | OUTPATIENT
Start: 2024-07-23

## 2024-07-23 RX ORDER — MIDODRINE HYDROCHLORIDE 2.5 MG/1
2.5 TABLET ORAL 2 TIMES DAILY WITH MEALS
Qty: 90 TABLET | Refills: 3 | Status: SHIPPED | OUTPATIENT
Start: 2024-07-23

## 2024-07-23 RX ORDER — MIDODRINE HYDROCHLORIDE 5 MG/1
2.5 TABLET ORAL 2 TIMES DAILY WITH MEALS
Status: DISCONTINUED | OUTPATIENT
Start: 2024-07-23 | End: 2024-07-23 | Stop reason: HOSPADM

## 2024-07-23 RX ORDER — LOSARTAN POTASSIUM 50 MG/1
50 TABLET ORAL DAILY
Status: ON HOLD | COMMUNITY
End: 2024-07-23

## 2024-07-23 RX ADMIN — SODIUM CHLORIDE, PRESERVATIVE FREE 10 ML: 5 INJECTION INTRAVENOUS at 09:39

## 2024-07-23 RX ADMIN — AMLODIPINE BESYLATE 5 MG: 5 TABLET ORAL at 09:34

## 2024-07-23 RX ADMIN — TICAGRELOR 90 MG: 90 TABLET ORAL at 09:34

## 2024-07-23 RX ADMIN — ISOSORBIDE MONONITRATE 60 MG: 60 TABLET, EXTENDED RELEASE ORAL at 09:34

## 2024-07-23 RX ADMIN — HYDRALAZINE HYDROCHLORIDE 25 MG: 25 TABLET ORAL at 09:33

## 2024-07-23 RX ADMIN — FERROUS SULFATE TAB 325 MG (65 MG ELEMENTAL FE) 325 MG: 325 (65 FE) TAB at 09:33

## 2024-07-23 RX ADMIN — DULOXETINE HYDROCHLORIDE 60 MG: 60 CAPSULE, DELAYED RELEASE ORAL at 09:33

## 2024-07-23 RX ADMIN — LEVETIRACETAM 500 MG: 500 TABLET, FILM COATED ORAL at 09:33

## 2024-07-23 RX ADMIN — PREDNISONE 10 MG: 5 TABLET ORAL at 09:33

## 2024-07-23 RX ADMIN — ACETAMINOPHEN 1000 MG: 500 TABLET ORAL at 05:17

## 2024-07-23 RX ADMIN — ASPIRIN 81 MG: 81 TABLET, CHEWABLE ORAL at 09:34

## 2024-07-23 RX ADMIN — HEPARIN SODIUM 5000 UNITS: 5000 INJECTION INTRAVENOUS; SUBCUTANEOUS at 09:34

## 2024-07-23 RX ADMIN — INSULIN LISPRO 4 UNITS: 100 INJECTION, SOLUTION INTRAVENOUS; SUBCUTANEOUS at 09:38

## 2024-07-23 RX ADMIN — RANOLAZINE 500 MG: 500 TABLET, FILM COATED, EXTENDED RELEASE ORAL at 09:33

## 2024-07-23 RX ADMIN — INSULIN LISPRO 4 UNITS: 100 INJECTION, SOLUTION INTRAVENOUS; SUBCUTANEOUS at 13:18

## 2024-07-23 RX ADMIN — PANTOPRAZOLE SODIUM 40 MG: 40 TABLET, DELAYED RELEASE ORAL at 05:17

## 2024-07-23 RX ADMIN — HYDRALAZINE HYDROCHLORIDE 25 MG: 25 TABLET ORAL at 13:18

## 2024-07-23 ASSESSMENT — PAIN SCALES - GENERAL: PAINLEVEL_OUTOF10: 0

## 2024-07-23 NOTE — DISCHARGE INSTR - COC
Continuity of Care Form    Patient Name: Maren Meza   :  1955  MRN:  [id number]    Admit date:  2024  Discharge date:  ***    Code Status Order: Full Code   Advance Directives:     Admitting Physician:  Jason Herrera MD  PCP: Marin Cardenas MD    Discharging Nurse: ***  Discharging Hospital Unit/Room#: G8U-7121/4260-01  Discharging Unit Phone Number: ***    Emergency Contact:   Extended Emergency Contact Information  Primary Emergency Contact: Senthil Fisheronda   [facility]  Home Phone: [phone]  Mobile Phone: [phone]  Relation: Child  Secondary Emergency Contact: Ester Jenkins  Address: DAUGHTER   [facility]  Home Phone: [phone]  Mobile Phone: [phone]  Relation: Child    Past Surgical History:  Past Surgical History:   Procedure Laterality Date    ABLATION OF DYSRHYTHMIC FOCUS    and 2020    times 2    ARTERY BIOPSY Right 2014    RIGHT TEMPORAL ARTERY BIOPSY    CAROTID ARTERY SURGERY Left     clean up per pt    CATARACT REMOVAL Bilateral     CHOLECYSTECTOMY      COLONOSCOPY N/A 2021    COLONOSCOPY WITH BIOPSY performed by Gera Matthews MD at [facility] ENDOSCOPY    COLONOSCOPY N/A 10/15/2021    COLONOSCOPY performed by Gera Matthews MD at [facility] ENDOSCOPY    COLONOSCOPY N/A 2024    COLONOSCOPY POLYPECTOMY SNARE/BIOPSY performed by Marcio Castillo MD at [facility] ENDOSCOPY    CORONARY ANGIOPLASTY WITH STENT PLACEMENT      HYSTERECTOMY (CERVIX STATUS UNKNOWN)      JOINT REPLACEMENT Right     KNEE ARTHROSCOPY Right     PTCA  10/2019    LAD and RCA inrtervention    TUNNELED VENOUS PORT PLACEMENT      left thigh.  SMART PORT-----Removed--total of 4 port placement and removal    UPPER GASTROINTESTINAL ENDOSCOPY N/A 2020    EGD DIAGNOSTIC ONLY performed by Jose Pickens MD at [facility] ENDOSCOPY    UPPER GASTROINTESTINAL ENDOSCOPY N/A 2024    ESOPHAGOGASTRODUODENOSCOPY BIOPSY performed by Marcio Castillo MD at [facility] ENDOSCOPY  care    Address: [address] [place] [address]  Phone: [phone]  Fax: [phone]       / signature: Electronically signed by CUATE Dubon, ZOYAW on 7/23/24 at 9:55 AM EDT    PHYSICIAN SECTION    Prognosis: Fair    Condition at Discharge: Stable    Rehab Potential (if transferring to Rehab): Fair    Recommended Labs or Other Treatments After Discharge: NA    Physician Certification: I certify the above information and transfer of Maren Meza  is necessary for the continuing treatment of the diagnosis listed and that she requires Home Care for less 30 days.     Update Admission H&P: No change in H&P    PHYSICIAN SIGNATURE:  Electronically signed by Eneida Burger MD on 7/23/24 at 10:56 AM EDT

## 2024-07-23 NOTE — PROGRESS NOTES
Pharmacy Medication Reconciliation Note     List of medications patient is currently taking is complete.    Source of information:   1. Conversation with Maren. She reports taking whatever is in the mediset that [facility] fills  2. Called [facility]. See below        Notes regarding home medications:   1.  Per the hx at [facility]. Midodrine and Hydralazine were \" suspended\" by her provider on 7/17/24. Mediset was sent to pt on 7/18/24   2. They fill Isosorbide, Hydroxyzine and Tramadol in medicine bottles.  3. Pt states she injects insulin. [facility] has not filled this year.  4. Removed Norvasc. 30 day supply filled in March 2024.  5. Added Losartan.   6. Toprol XL 50 mg in BID     Rosa Lux [facility]   7/23/2024  9:37 AM

## 2024-07-23 NOTE — PLAN OF CARE
Problem: Pain  Goal: Verbalizes/displays adequate comfort level or baseline comfort level  7/22/2024 2236 by Chapo Kinney RN  Outcome: Progressing  7/22/2024 1058 by Dario Negro RN  Outcome: Progressing  Flowsheets (Taken 7/22/2024 0746)  Verbalizes/displays adequate comfort level or baseline comfort level:   Encourage patient to monitor pain and request assistance   Assess pain using appropriate pain scale   Administer analgesics based on type and severity of pain and evaluate response   Implement non-pharmacological measures as appropriate and evaluate response   Consider cultural and social influences on pain and pain management   Notify Licensed Independent Practitioner if interventions unsuccessful or patient reports new pain     Problem: Discharge Planning  Goal: Discharge to home or other facility with appropriate resources  7/22/2024 2236 by Chapo Kinney RN  Outcome: Progressing  Flowsheets (Taken 7/22/2024 2232)  Discharge to home or other facility with appropriate resources:   Identify barriers to discharge with patient and caregiver   Arrange for needed discharge resources and transportation as appropriate   Identify discharge learning needs (meds, wound care, etc)  7/22/2024 1058 by Dario Negro RN  Outcome: Progressing  Flowsheets (Taken 7/22/2024 0746)  Discharge to home or other facility with appropriate resources:   Identify barriers to discharge with patient and caregiver   Arrange for needed discharge resources and transportation as appropriate     Problem: Skin/Tissue Integrity  Goal: Absence of new skin breakdown  Description: 1.  Monitor for areas of redness and/or skin breakdown  2.  Assess vascular access sites hourly  3.  Every 4-6 hours minimum:  Change oxygen saturation probe site  4.  Every 4-6 hours:  If on nasal continuous positive airway pressure, respiratory therapy assess nares and determine need for appliance change or resting period.  7/22/2024 2236 by

## 2024-07-23 NOTE — PROGRESS NOTES
Pt educated on discharge instructions and follow-up appointments. Pt states no further questions at this time. Pt midline removed with no complications. Pt transported to [facility] by this RN, transported home via lyft.    Electronically signed by Rogerio Ureña RN on 7/23/24 at 2:37 PM EDT

## 2024-07-23 NOTE — PLAN OF CARE
Problem: Pain  Goal: Verbalizes/displays adequate comfort level or baseline comfort level  7/23/2024 1130 by Rogerio Ureña RN  Outcome: Completed  7/23/2024 1129 by Rogerio Ureña RN  Outcome: HH/HSPC Resolved Resume Next Certification Period  7/23/2024 0005 by Sana Dean RN  Outcome: Progressing  7/22/2024 2236 by Chapo Kinney RN  Outcome: Progressing     Problem: Discharge Planning  Goal: Discharge to home or other facility with appropriate resources  7/23/2024 1130 by Rogerio Ureña RN  Outcome: Completed  7/23/2024 1129 by Rogerio Ureña RN  Outcome: HH/HSPC Resolved Resume Next Certification Period  7/23/2024 0005 by Sana Dean RN  Outcome: Progressing  7/22/2024 2236 by Chapo Kinney RN  Outcome: Progressing  Flowsheets (Taken 7/22/2024 2232)  Discharge to home or other facility with appropriate resources:   Identify barriers to discharge with patient and caregiver   Arrange for needed discharge resources and transportation as appropriate   Identify discharge learning needs (meds, wound care, etc)     Problem: Skin/Tissue Integrity  Goal: Absence of new skin breakdown  Description: 1.  Monitor for areas of redness and/or skin breakdown  2.  Assess vascular access sites hourly  3.  Every 4-6 hours minimum:  Change oxygen saturation probe site  4.  Every 4-6 hours:  If on nasal continuous positive airway pressure, respiratory therapy assess nares and determine need for appliance change or resting period.  7/23/2024 1130 by Rogerio Ureña RN  Outcome: Completed  7/23/2024 1129 by Rogerio Ureña RN  Outcome: HH/HSPC Resolved Resume Next Certification Period  7/23/2024 0005 by Sana Dean RN  Outcome: Progressing  7/22/2024 2236 by Chapo Kinney RN  Outcome: Progressing     Problem: Safety - Adult  Goal: Free from fall injury  7/23/2024 1130 by Rogerio Ureña RN  Outcome: Completed  7/23/2024 1129 by Rogerio Ureañ RN  Outcome: HH/HSPC Resolved Resume Next Certification Period  7/23/2024  function  7/23/2024 1130 by Rogerio Ureña, RN  Outcome: Completed  7/23/2024 1129 by Rogerio Ureña, RN  Outcome: HH/HSPC Resolved Resume Next Certification Period

## 2024-07-23 NOTE — CARE COORDINATION
Case Management Discharge Note          Date / Time of Note: 7/23/2024 2:17 PM                  Patient Name: Maren Meza   YOB: 1955  Diagnosis: Accelerated hypertension [I10]  Elevated troponin [R79.89]  Acute chest pain [R07.9]  Hypertensive urgency [I16.0]  Chronic renal impairment, unspecified CKD stage [N18.9]   Date / Time: 7/21/2024  3:07 PM    Financial:  Payor: [employer] / Plan: [employer] [place] DUAL / Product Type: *No Product type* /      Pharmacy:    [facility] [address] [facility] -  [phone] - F [phone]  [address] [place] [id number]  Phone: [phone] Fax: [phone]    [facility] [address] [facility] [phone] - F [phone]  [address] [facility] [address]  Phone: [phone] Fax: [phone]    [facility] [address] [facility] [phone] - F [phone]  [address] [facility] [address]  Phone: [phone] Fax: [phone]    [id number] [place], [place] - [address] P [phone] - F [phone]  [address]  [facility] [address]  Phone: [phone] Fax: [phone]      Assistance purchasing medications?: Potential Assistance Purchasing Medications: No  Assistance provided by Case Management: None at this time    DISCHARGE Disposition: Home with Home Health Care    Home Care:  Home Care ordered at discharge: Yes  Home Care Agency: [facility]  Phone: [phone]  Fax: [phone]  Orders faxed: Yes      Transportation:  Transportation PLAN for discharge: self   Mode of Transport: Private Car through insurance.  Time of Transport: TBD by patient and/or medical staff.     Transport form completed: Not Indicated    IMM Completed:   Yes, Case management has presented and reviewed IMM letter #2.           .   Patient and/or family/POA verbalized understanding of their

## 2024-07-23 NOTE — DISCHARGE INSTRUCTIONS
High Blood Pressure: Care Instructions  Overview     It's normal for blood pressure to go up and down throughout the day. But if it stays up, you have high blood pressure. Another name for high blood pressure is hypertension. For diagnosis, the top number may be 130 to 140 or higher. The bottom number may be 80 to 90 or higher.  Despite what a lot of people think, high blood pressure usually doesn't cause headaches or make you feel dizzy or lightheaded. It usually has no symptoms. But it does increase your risk of stroke, heart attack, and other problems. You and your doctor will talk about your risks of these problems based on your blood pressure.  Your doctor will give you a goal for your blood pressure. Your goal will be based on your health and your age.  Lifestyle changes, such as eating healthy and being active, are always important to help lower blood pressure. You might also take medicine to reach your blood pressure goal.  Follow-up care is a key part of your treatment and safety. Be sure to make and go to all appointments, and call your doctor if you are having problems. It's also a good idea to know your test results and keep a list of the medicines you take.  How can you care for yourself at home?  Medical treatment  If you stop taking your medicine, your blood pressure will go back up. You may take one or more types of medicine to lower your blood pressure. Be safe with medicines. Take your medicine exactly as prescribed. Call your doctor if you think you are having a problem with your medicine.  Talk to your doctor before you start taking aspirin every day. Aspirin can help certain people lower their risk of a heart attack or stroke. But taking aspirin isn't right for everyone, because it can cause serious bleeding.  See your doctor regularly. You may need to see the doctor more often at first or until your blood pressure comes down.  If you are taking blood pressure medicine, talk to your doctor

## 2024-07-23 NOTE — PLAN OF CARE
Problem: Pain  Goal: Verbalizes/displays adequate comfort level or baseline comfort level  7/23/2024 0005 by Sana Dean RN  Outcome: Progressing  7/22/2024 2236 by Chapo Kinney RN  Outcome: Progressing  7/22/2024 1058 by Dario Negro RN  Outcome: Progressing  Flowsheets (Taken 7/22/2024 0746)  Verbalizes/displays adequate comfort level or baseline comfort level:   Encourage patient to monitor pain and request assistance   Assess pain using appropriate pain scale   Administer analgesics based on type and severity of pain and evaluate response   Implement non-pharmacological measures as appropriate and evaluate response   Consider cultural and social influences on pain and pain management   Notify Licensed Independent Practitioner if interventions unsuccessful or patient reports new pain     Problem: Discharge Planning  Goal: Discharge to home or other facility with appropriate resources  7/23/2024 0005 by Sana Dean RN  Outcome: Progressing  7/22/2024 2236 by Chapo Kinney RN  Outcome: Progressing  Flowsheets (Taken 7/22/2024 2232)  Discharge to home or other facility with appropriate resources:   Identify barriers to discharge with patient and caregiver   Arrange for needed discharge resources and transportation as appropriate   Identify discharge learning needs (meds, wound care, etc)  7/22/2024 1058 by Dario Negro RN  Outcome: Progressing  Flowsheets (Taken 7/22/2024 0746)  Discharge to home or other facility with appropriate resources:   Identify barriers to discharge with patient and caregiver   Arrange for needed discharge resources and transportation as appropriate     Problem: Skin/Tissue Integrity  Goal: Absence of new skin breakdown  Description: 1.  Monitor for areas of redness and/or skin breakdown  2.  Assess vascular access sites hourly  3.  Every 4-6 hours minimum:  Change oxygen saturation probe site  4.  Every 4-6 hours:  If on nasal continuous positive airway

## 2024-07-24 ENCOUNTER — CARE COORDINATION (OUTPATIENT)
Dept: CASE MANAGEMENT | Age: 69
End: 2024-07-24

## 2024-07-24 ENCOUNTER — CARE COORDINATION (OUTPATIENT)
Dept: PRIMARY CARE CLINIC | Age: 69
End: 2024-07-24

## 2024-07-24 DIAGNOSIS — I10 ESSENTIAL HYPERTENSION: ICD-10-CM

## 2024-07-24 DIAGNOSIS — I50.32 CHRONIC DIASTOLIC CHF (CONGESTIVE HEART FAILURE), NYHA CLASS 2 (HCC): Primary | Chronic | ICD-10-CM

## 2024-07-24 DIAGNOSIS — I16.1 HYPERTENSIVE EMERGENCY: Primary | ICD-10-CM

## 2024-07-24 DIAGNOSIS — J44.9 CHRONIC OBSTRUCTIVE PULMONARY DISEASE, UNSPECIFIED COPD TYPE (HCC): Chronic | ICD-10-CM

## 2024-07-24 PROCEDURE — 1111F DSCHRG MED/CURRENT MED MERGE: CPT

## 2024-07-24 NOTE — PROGRESS NOTES
Remote Patient Monitoring Treatment Plan    Received request from ACM/Jyoti Clemons RN   to order remote patient monitoring for in home monitoring of CHF; Condition managed by Dr. Ross Foley ([facility]).  COPD; Condition managed by PCP.  HTN; Condition managed by PCP.  and order completed.     Patient will be monitoring blood pressure   pulse ox   weight  survey questions.      Patient will engage in Remote Patient Monitoring each day to develop the skills necessary for self management.       RPM Care Team Responsibilities:   Alerts will be reviewed daily and addressed within 2-4 hours during operational hours (Monday -Friday 9 am-4 pm)  Alert response and intervention documented in patient medical record  Alert response escalated to PCP per protocol and documented in patient medical record  Patient monitored over approximately  days  Discharge from program based on self-management readiness    See care coordination encounters for additional details.

## 2024-07-24 NOTE — CARE COORDINATION
RPM Kit Order    Remote Patient Kit Ordering Note      Date/Time:  7/24/2024 2:44 PM      CCSS placed phone call to patient/family today to notify of RPM kit order; patient/family was available; discussed the following topics below and all questions answered.    [x] CCSS confirmed patient shipping address  [x] Patient will receive package over the next 1-3 business days. Someone 21 years or older must be present to sign for UPS delivery.  [x] HRS will contact patient within 24 hours, an HRS  will call the patient directly: If the patient does not answer, HRS will follow up with the clinical team notifying them about the unsuccessful attempt to contact the patient. HRS will make three call attempts to the patient.Provide patient with [facility] Virtual install number is: [phone].  [x] CTN will contact patient once equipment is active to welcome them to the program.                                                         [x] Hours of RPM monitoring - Monday-Friday [id number]; encourage patient to get vitals entered by Noon each day to have the alert addressed same day.  [x][facility] to patient.                      CTN made aware the RPM kit has been ordered.

## 2024-07-24 NOTE — CARE COORDINATION
Patient needs assistance in scheduling a HFU appt with the PCP. No appts available when writer tried to assist. Will send a message to the office pool to facilitate.    Jyoti Russell RN BSN  Care Transition Nurse  [phone]

## 2024-07-25 ENCOUNTER — CARE COORDINATION (OUTPATIENT)
Dept: CASE MANAGEMENT | Age: 69
End: 2024-07-25

## 2024-07-25 NOTE — CARE COORDINATION
/Spoke with Maren. She states she is feeling better today than she did yesterday. She states. her cardiologist is  with [facility]. She states she does not see a Pulmonologist. She states  manages her breathing issues.    Maren states [facility] was at her home yesterday.    She has a HFU appt scheduled with the PCP on  07/29/2024.    Jyoti Russell RN BSN  Care Transition Nurse  [phone]

## 2024-08-02 ENCOUNTER — CARE COORDINATION (OUTPATIENT)
Dept: CASE MANAGEMENT | Age: 69
End: 2024-08-02

## 2024-08-02 NOTE — CARE COORDINATION
Care Transitions Note    Follow Up Call     Patient Current Location:  [place]    Care Transition Nurse contacted the patient by telephone. Verified name and  as identifiers.    Additional needs identified to be addressed with provider   No needs identified         Method of communication with provider: none.    Care Summary Note:   Placed a call to patient and spoke with her very briefly.  Asked how she was doing.  She said she was doing ok.  She started to say some things then she said she was out of the home right now and asked if CTN could call back next week.  Said she had something going on and could not talk right now.  Said sheis doing ok and has not needs.  Said she feels better than she did last night, yesterday.  Said next week is fine for another call.  CTN will schedule her for follow up next week.      Plan of care updates since last contact:  Review of patient management of conditions/medications:    Ongoing assessment of patient's ability to manage self care needs in the home environment under current circumstances.    Remote Patient Monitoring:  Offered patient enrollment in the Remote Patient Monitoring (RPM) program for in-home monitoring: Yes, patient enrolled; current status is activated and monitoring.    Assessments:  Care Transitions Subsequent and Final Call    Subsequent and Final Calls  Do you have any ongoing symptoms?: No  Care Transitions Interventions  Other Interventions:           Care Transition Nurse provided contact information.    Plan for follow-up call in 2-5 days based on severity of symptoms and risk factors.  Plan for next call: symptom management-any abnormal symptoms, new issues  self management-changes in ability to manage needs in the home      Lakesha Maldonado RN

## 2024-08-05 ENCOUNTER — CARE COORDINATION (OUTPATIENT)
Dept: CASE MANAGEMENT | Age: 69
End: 2024-08-05

## 2024-08-05 NOTE — CARE COORDINATION
-Remote Alert Monitoring Note      Date/Time:  2024 2:46 PM  Patient Current Location: [place]  Verified patients name and  as identifiers.    Rpm alert to be reviewed by the provider                  LPN contacted patient by telephone regarding red alert received   Background: RPM for COPD, CHF, HTN  Refer to 911 immediately if:  Patient unresponsive or unable to provide history  Change in cognition or sudden confusion  Patient unable to respond in complete sentences  Intense chest pain/tightness  Any concern for any clinical emergency  Red Alert: Provider response time of 1 hr required for any red alert requiring intervention  Yellow Alert: Provider response time of 3hr required for any escalated yellow alert  Patient Chief Complaint:  BP Triage  Are you having any Chest Pain? no   Are you having any Shortness of Breath? no   Do you have a headache or have any vision changes? no   Are you having any numbness or tingling? no   Are you having any other health concerns or issues? no     Clinical Interventions: LPN contacted pt in regard to RPM red alert for BP of, 191/72. Pt denied any new, worrisome and or worsening symptoms. Writer asked pt is she was taking the five BP meds listed on her med list, along with her other meds, and pt advised writer that she is confused about her meds and which meds she is suppose to take \"because my doctor took me off some.\" Writer sent request to CTN to do med review asap with pt. PT rechecked and updated BP is, 189/67.     Plan/Follow Up: Will continue to review, monitor and address alerts with follow up based on severity of symptoms and risk factors.  **For any new or worsening symptoms or you are concerned in anyway, please contact your Provider or report to the nearest Emergency Room.**            Roman Pastor LPN, [facility], Remote Patient Monitoring    PH: [phone]  Email: [email or web address]

## 2024-08-06 ENCOUNTER — CARE COORDINATION (OUTPATIENT)
Dept: CASE MANAGEMENT | Age: 69
End: 2024-08-06

## 2024-08-06 NOTE — CARE COORDINATION
Care Transitions Note    Follow Up Call     Patient Current Location:  Home: [address]  [address]  [place] [address]    [facility] Care Coordinator contacted the patient by telephone. Verified name and  as identifiers.    Additional needs identified to be addressed with provider   Standard priority: patient was at [facility] on 2024. She wasn't sure how she was to take her medication. She was placed on Nifedipine 30 mg daily, Novolog O-10 units subcutaneously before meals but no sliding scale. Nystatin powder under breast twice daily as needed. Colace 100 mg -,  Cymbalta 20 mg 2 times daily,  Basaglar kwikpen 100unit/ml 10 units 2 times daily, metoprolol 100 mg 2 times daily. Please advise patient.                  Method of communication with provider: chart routing.    Care Summary Note:   Spoke with patient. She reports she is ok. Denies cp, palpitations, ha, dizziness, fever, chills, sob, cough, wheeze, weakness, fall or fatigue. Appetite and fluid intake is good. No urinary or bowel problems. Patient confused on how to take medications. Patient stated she was at [facility] on 2024. Medication review completed. Will notify PCP of changes. Has a little swelling in ankles, nausea and pain, but it is all at baseline. Wt 113 lbs, .     Plan of care updates since last contact:  Education: taking medication as prescribed  Review of patient management of conditions/medications: medication review completed         Advance Care Planning:   Does patient have an Advance Directive: reviewed and current.    Medication Review:  Medication reconciliation was performed with patient,1111F entered: no.    Remote Patient Monitoring:  Offered patient enrollment in the Remote Patient Monitoring (RPM) program for in-home monitoring: Yes, patient enrolled; current status is activated and monitoring.    Remote Patient Monitoring Welcome Note  Date/Time:  2024 1:07 PM  Patient Current Location: Home: [address]

## 2024-08-07 ENCOUNTER — CARE COORDINATION (OUTPATIENT)
Dept: CASE MANAGEMENT | Age: 69
End: 2024-08-07

## 2024-08-07 NOTE — CARE COORDINATION
-Remote Alert Monitoring Note      Date/Time:  2024 9:15 AM  Patient Current Location: [place]  Verified patients name and  as identifiers.          Rpm alert to be reviewed by the provider                       LPN contacted patient by telephone regarding red alert received   Background: RPM for COPD, CHF, HTN  Refer to 911 immediately if:  Patient unresponsive or unable to provide history  Change in cognition or sudden confusion  Patient unable to respond in complete sentences  Intense chest pain/tightness  Any concern for any clinical emergency  Red Alert: Provider response time of 1 hr required for any red alert requiring intervention  Yellow Alert: Provider response time of 3hr required for any escalated yellow alert  Patient Chief Complaint:  BP Triage  Are you having any Chest Pain? no   Are you having any Shortness of Breath? no   Do you have a headache or have any vision changes? no   Are you having any numbness or tingling? no   Are you having any other health concerns or issues? no      Clinical Interventions: LPN contacted pt in regard to RPM red alert for BP of, 191/72. Pt denied any new, worrisome and or worsening symptoms. Writer asked pt is she now taking the five BP meds listed on her med list, along with her other meds, and pt reluctantly advised that she is compliant with her meds. Pt rechecked and updated BP is, 137/60.     Plan/Follow Up: Will continue to review, monitor and address alerts with follow up based on severity of symptoms and risk factors.  **For any new or worsening symptoms or you are concerned in anyway, please contact your Provider or report to the nearest Emergency Room.**            Roman Pastor LPN, [facility], Remote Patient Monitoring     PH: [phone]  Email: [email or web address]

## 2024-08-08 ENCOUNTER — CARE COORDINATION (OUTPATIENT)
Dept: CASE MANAGEMENT | Age: 69
End: 2024-08-08

## 2024-08-08 NOTE — CARE COORDINATION
Care Transitions Note    Follow Up Call     Patient Current Location:  Home: [address]  [address]  [place] [address]    Care Transition Nurse contacted the patient by telephone. Verified name and  as identifiers.    Additional needs identified to be addressed with provider   Patient is calling her son to return to the ED - elevated B/P - dizziness - nausea - tearful.                 Method of communication with provider:  patient is returning to the ED .    Care Summary Note: Call placed to Maren. She states she is not feeling well. She states she has not been out of the house today and has not heard the phone ring. She states she is dizzy - nauseated and just not feeling well. She sounds tearful. She is calling her son to have him take her to the ED. Declined calling EMS. CT team will follow.    Plan of care updates since last contact:  Patient plans to return to the ED       Remote Patient Monitoring:  Offered patient enrollment in the Remote Patient Monitoring (RPM) program for in-home monitoring: Yes, patient enrolled; current status is activated and monitoring.    Assessments:  Care Transitions Subsequent and Final Call    Subsequent and Final Calls  Are you currently active with any services?: Home Health  Care Transitions Interventions  Other Interventions:              Follow Up Appointment:   Patient returning to the ED      Care Transition Nurse provided contact information.  Plan for follow-up call in 2-5 days based on severity of symptoms and risk factors.  Plan for next call:  check on patient status.      Jyoti Russell RN BSN  Care Transition Nurse  [phone]

## 2024-08-08 NOTE — CARE COORDINATION
Date/Time:  8/8/2024 8:50 AM  LPN attempted to reach patient and emergency contact by telephone x 3 regarding red alert in remote patient monitoring program for BP of 228/88. Unable to leave a HIPPA compliant message requesting a return call. Writer will route to [facility], per RPM protocol, if unable to reach patient and emergency contact after three attempts.      Roman Pastor LPN, [facility], Remote Patient Monitoring    PH: [phone]  Email: [email or web address]

## 2024-08-09 ENCOUNTER — CARE COORDINATION (OUTPATIENT)
Dept: CASE MANAGEMENT | Age: 69
End: 2024-08-09

## 2024-08-09 NOTE — CARE COORDINATION
Care Transitions Note    Follow Up Call     Attempted to reach patient for transitions of care follow up.  Unable to reach patient.      Outreach Attempts:   HIPAA compliant voicemail left for patient.         Plan for follow-up call in 2-5 days based on severity of symptoms and risk factors. Plan for next call:  B/P -  SANNA Russell RN BSN  Care Transition Nurse  [phone]

## 2024-08-09 NOTE — CARE COORDINATION
Date/Time:  8/9/2024 10:31 AM  LPN attempted to reach patient AND emergency contact by telephone x 3 regarding red alert in remote patient monitoring program for BP of, 204/147.     This writer Left HIPPA compliant message ON BOTH pt's VM and emergency contact's VM, requesting a return call. Will attempt to reach patient again.     Writer will route to [facility], per RPM protocol, if unable to reach patient and emergency contact after three attempts.      Roman Pastor LPN, [facility], Remote Patient Monitoring    PH: [phone]  Email: [email or web address]

## 2024-08-13 ENCOUNTER — HOSPITAL ENCOUNTER (EMERGENCY)
Age: 69
Discharge: HOME | End: 2024-08-13
Payer: COMMERCIAL

## 2024-08-13 ENCOUNTER — APPOINTMENT (OUTPATIENT)
Dept: CT IMAGING | Age: 69
End: 2024-08-13
Payer: COMMERCIAL

## 2024-08-13 ENCOUNTER — APPOINTMENT (OUTPATIENT)
Dept: GENERAL RADIOLOGY | Age: 69
End: 2024-08-13
Payer: COMMERCIAL

## 2024-08-13 VITALS
RESPIRATION RATE: 14 BRPM | SYSTOLIC BLOOD PRESSURE: 141 MMHG | WEIGHT: 33.73 LBS | DIASTOLIC BLOOD PRESSURE: 74 MMHG | OXYGEN SATURATION: 97 % | HEART RATE: 68 BPM | BODY MASS INDEX: 6.62 KG/M2 | TEMPERATURE: 97.9 F | HEIGHT: 60 IN

## 2024-08-13 DIAGNOSIS — K52.9 ENTERITIS: Primary | ICD-10-CM

## 2024-08-13 DIAGNOSIS — E87.6 HYPOKALEMIA: ICD-10-CM

## 2024-08-13 DIAGNOSIS — I10 ESSENTIAL HYPERTENSION: ICD-10-CM

## 2024-08-13 DIAGNOSIS — I10 HYPERTENSION, UNCONTROLLED: ICD-10-CM

## 2024-08-13 DIAGNOSIS — N18.4 CKD (CHRONIC KIDNEY DISEASE) STAGE 4, GFR 15-29 ML/MIN (HCC): ICD-10-CM

## 2024-08-13 LAB
ALBUMIN SERPL-MCNC: 3.1 G/DL (ref 3.4–5)
ALP SERPL-CCNC: 112 U/L (ref 40–129)
ALT SERPL-CCNC: 14 U/L (ref 10–40)
AMORPH SED URNS QL MICRO: ABNORMAL /HPF
ANION GAP SERPL CALCULATED.3IONS-SCNC: 14 MMOL/L (ref 3–16)
AST SERPL-CCNC: 17 U/L (ref 15–37)
BACTERIA URNS QL MICRO: ABNORMAL /HPF
BASOPHILS # BLD: 0 K/UL (ref 0–0.2)
BASOPHILS NFR BLD: 0.7 %
BILIRUB DIRECT SERPL-MCNC: <0.2 MG/DL (ref 0–0.3)
BILIRUB INDIRECT SERPL-MCNC: ABNORMAL MG/DL (ref 0–1)
BILIRUB SERPL-MCNC: 0.4 MG/DL (ref 0–1)
BILIRUB UR QL STRIP.AUTO: NEGATIVE
BUN SERPL-MCNC: 44 MG/DL (ref 7–20)
CALCIUM SERPL-MCNC: 8.9 MG/DL (ref 8.3–10.6)
CHARACTER UR: ABNORMAL
CHLORIDE SERPL-SCNC: 100 MMOL/L (ref 99–110)
CLARITY UR: ABNORMAL
CO2 SERPL-SCNC: 22 MMOL/L (ref 21–32)
COLOR UR: YELLOW
CREAT SERPL-MCNC: 2.2 MG/DL (ref 0.6–1.2)
DEPRECATED RDW RBC AUTO: 17.5 % (ref 12.4–15.4)
EKG ATRIAL RATE: 74 BPM
EKG DIAGNOSIS: NORMAL
EKG P AXIS: -19 DEGREES
EKG P-R INTERVAL: 108 MS
EKG Q-T INTERVAL: 406 MS
EKG QRS DURATION: 80 MS
EKG QTC CALCULATION (BAZETT): 450 MS
EKG R AXIS: 30 DEGREES
EKG T AXIS: 167 DEGREES
EKG VENTRICULAR RATE: 74 BPM
EOSINOPHIL # BLD: 0.1 K/UL (ref 0–0.6)
EOSINOPHIL NFR BLD: 1.3 %
EPI CELLS #/AREA URNS HPF: ABNORMAL /HPF (ref 0–5)
GFR SERPLBLD CREATININE-BSD FMLA CKD-EPI: 24 ML/MIN/{1.73_M2}
GLUCOSE SERPL-MCNC: 172 MG/DL (ref 70–99)
GLUCOSE UR STRIP.AUTO-MCNC: NEGATIVE MG/DL
HCT VFR BLD AUTO: 31.2 % (ref 36–48)
HGB BLD-MCNC: 10 G/DL (ref 12–16)
HGB UR QL STRIP.AUTO: NEGATIVE
INR PPP: 0.99 (ref 0.85–1.15)
KETONES UR STRIP.AUTO-MCNC: NEGATIVE MG/DL
LACTATE BLDV-SCNC: 1.6 MMOL/L (ref 0.4–2)
LEUKOCYTE ESTERASE UR QL STRIP.AUTO: NEGATIVE
LIPASE SERPL-CCNC: 24 U/L (ref 13–60)
LYMPHOCYTES # BLD: 1.5 K/UL (ref 1–5.1)
LYMPHOCYTES NFR BLD: 22.6 %
MAGNESIUM SERPL-MCNC: 2 MG/DL (ref 1.8–2.4)
MCH RBC QN AUTO: 30.1 PG (ref 26–34)
MCHC RBC AUTO-ENTMCNC: 32.1 G/DL (ref 31–36)
MCV RBC AUTO: 93.8 FL (ref 80–100)
MONOCYTES # BLD: 0.4 K/UL (ref 0–1.3)
MONOCYTES NFR BLD: 6.3 %
NEUTROPHILS # BLD: 4.4 K/UL (ref 1.7–7.7)
NEUTROPHILS NFR BLD: 69.1 %
NITRITE UR QL STRIP.AUTO: NEGATIVE
NT-PROBNP SERPL-MCNC: 1775 PG/ML (ref 0–124)
PH UR STRIP.AUTO: 5.5 [PH] (ref 5–8)
PLATELET # BLD AUTO: 218 K/UL (ref 135–450)
PMV BLD AUTO: 8.3 FL (ref 5–10.5)
POTASSIUM SERPL-SCNC: 3.3 MMOL/L (ref 3.5–5.1)
PROCALCITONIN SERPL IA-MCNC: 0.15 NG/ML (ref 0–0.15)
PROT SERPL-MCNC: 7 G/DL (ref 6.4–8.2)
PROT UR STRIP.AUTO-MCNC: 100 MG/DL
PROTHROMBIN TIME: 13.3 SEC (ref 11.9–14.9)
RBC # BLD AUTO: 3.33 M/UL (ref 4–5.2)
RBC #/AREA URNS HPF: ABNORMAL /HPF (ref 0–4)
REASON FOR REJECTION: NORMAL
REJECTED TEST: NORMAL
SODIUM SERPL-SCNC: 136 MMOL/L (ref 136–145)
SP GR UR STRIP.AUTO: 1.02 (ref 1–1.03)
TROPONIN, HIGH SENSITIVITY: 53 NG/L (ref 0–14)
TROPONIN, HIGH SENSITIVITY: 59 NG/L (ref 0–14)
UA COMPLETE W REFLEX CULTURE PNL UR: ABNORMAL
UA DIPSTICK W REFLEX MICRO PNL UR: YES
URN SPEC COLLECT METH UR: ABNORMAL
UROBILINOGEN UR STRIP-ACNC: 1 E.U./DL
WBC # BLD AUTO: 6.4 K/UL (ref 4–11)
WBC #/AREA URNS HPF: ABNORMAL /HPF (ref 0–5)

## 2024-08-13 PROCEDURE — 84484 ASSAY OF TROPONIN QUANT: CPT

## 2024-08-13 PROCEDURE — 96374 THER/PROPH/DIAG INJ IV PUSH: CPT

## 2024-08-13 PROCEDURE — 96375 TX/PRO/DX INJ NEW DRUG ADDON: CPT

## 2024-08-13 PROCEDURE — 93005 ELECTROCARDIOGRAM TRACING: CPT | Performed by: PHYSICIAN ASSISTANT

## 2024-08-13 PROCEDURE — 96361 HYDRATE IV INFUSION ADD-ON: CPT

## 2024-08-13 PROCEDURE — 87086 URINE CULTURE/COLONY COUNT: CPT

## 2024-08-13 PROCEDURE — 80048 BASIC METABOLIC PNL TOTAL CA: CPT

## 2024-08-13 PROCEDURE — 83690 ASSAY OF LIPASE: CPT

## 2024-08-13 PROCEDURE — 74176 CT ABD & PELVIS W/O CONTRAST: CPT

## 2024-08-13 PROCEDURE — 99285 EMERGENCY DEPT VISIT HI MDM: CPT

## 2024-08-13 PROCEDURE — 83735 ASSAY OF MAGNESIUM: CPT

## 2024-08-13 PROCEDURE — 2580000003 HC RX 258: Performed by: PHYSICIAN ASSISTANT

## 2024-08-13 PROCEDURE — 85610 PROTHROMBIN TIME: CPT

## 2024-08-13 PROCEDURE — 81001 URINALYSIS AUTO W/SCOPE: CPT

## 2024-08-13 PROCEDURE — 6360000002 HC RX W HCPCS: Performed by: PHYSICIAN ASSISTANT

## 2024-08-13 PROCEDURE — 6370000000 HC RX 637 (ALT 250 FOR IP): Performed by: PHYSICIAN ASSISTANT

## 2024-08-13 PROCEDURE — 71045 X-RAY EXAM CHEST 1 VIEW: CPT

## 2024-08-13 PROCEDURE — 84145 PROCALCITONIN (PCT): CPT

## 2024-08-13 PROCEDURE — 80076 HEPATIC FUNCTION PANEL: CPT

## 2024-08-13 PROCEDURE — 83605 ASSAY OF LACTIC ACID: CPT

## 2024-08-13 PROCEDURE — 93010 ELECTROCARDIOGRAM REPORT: CPT | Performed by: INTERNAL MEDICINE

## 2024-08-13 PROCEDURE — 83880 ASSAY OF NATRIURETIC PEPTIDE: CPT

## 2024-08-13 PROCEDURE — 85025 COMPLETE CBC W/AUTO DIFF WBC: CPT

## 2024-08-13 PROCEDURE — 36415 COLL VENOUS BLD VENIPUNCTURE: CPT

## 2024-08-13 RX ORDER — ONDANSETRON 4 MG/1
4 TABLET, ORALLY DISINTEGRATING ORAL ONCE
Status: DISCONTINUED | OUTPATIENT
Start: 2024-08-13 | End: 2024-08-13

## 2024-08-13 RX ORDER — MORPHINE SULFATE 2 MG/ML
2 INJECTION, SOLUTION INTRAMUSCULAR; INTRAVENOUS ONCE
Status: COMPLETED | OUTPATIENT
Start: 2024-08-13 | End: 2024-08-13

## 2024-08-13 RX ORDER — ONDANSETRON 2 MG/ML
4 INJECTION INTRAMUSCULAR; INTRAVENOUS ONCE
Status: COMPLETED | OUTPATIENT
Start: 2024-08-13 | End: 2024-08-13

## 2024-08-13 RX ORDER — OXYCODONE HYDROCHLORIDE AND ACETAMINOPHEN 5; 325 MG/1; MG/1
1 TABLET ORAL ONCE
Status: COMPLETED | OUTPATIENT
Start: 2024-08-13 | End: 2024-08-13

## 2024-08-13 RX ORDER — LACTOBACILLUS RHAMNOSUS GG 10B CELL
1 CAPSULE ORAL ONCE
Status: COMPLETED | OUTPATIENT
Start: 2024-08-13 | End: 2024-08-13

## 2024-08-13 RX ORDER — POTASSIUM CHLORIDE 750 MG/1
20 TABLET, FILM COATED, EXTENDED RELEASE ORAL ONCE
Status: COMPLETED | OUTPATIENT
Start: 2024-08-13 | End: 2024-08-13

## 2024-08-13 RX ORDER — OMEPRAZOLE 20 MG/1
20 CAPSULE, DELAYED RELEASE ORAL DAILY
Qty: 28 CAPSULE | Refills: 1 | Status: SHIPPED | OUTPATIENT
Start: 2024-08-13

## 2024-08-13 RX ORDER — CIPROFLOXACIN 250 MG/1
250 TABLET, FILM COATED ORAL 2 TIMES DAILY
Qty: 10 TABLET | Refills: 0 | Status: SHIPPED | OUTPATIENT
Start: 2024-08-13 | End: 2024-08-18

## 2024-08-13 RX ORDER — 0.9 % SODIUM CHLORIDE 0.9 %
1000 INTRAVENOUS SOLUTION INTRAVENOUS ONCE
Status: COMPLETED | OUTPATIENT
Start: 2024-08-13 | End: 2024-08-13

## 2024-08-13 RX ADMIN — HYOSCYAMINE SULFATE 125 MCG: 0.12 TABLET ORAL; SUBLINGUAL at 15:27

## 2024-08-13 RX ADMIN — POTASSIUM CHLORIDE 20 MEQ: 750 TABLET, FILM COATED, EXTENDED RELEASE ORAL at 17:42

## 2024-08-13 RX ADMIN — ONDANSETRON 4 MG: 2 INJECTION INTRAMUSCULAR; INTRAVENOUS at 15:25

## 2024-08-13 RX ADMIN — MORPHINE SULFATE 2 MG: 2 INJECTION, SOLUTION INTRAMUSCULAR; INTRAVENOUS at 13:32

## 2024-08-13 RX ADMIN — SODIUM CHLORIDE 1000 ML: 9 INJECTION, SOLUTION INTRAVENOUS at 13:33

## 2024-08-13 RX ADMIN — Medication 1 CAPSULE: at 15:26

## 2024-08-13 RX ADMIN — OXYCODONE HYDROCHLORIDE AND ACETAMINOPHEN 1 TABLET: 5; 325 TABLET ORAL at 15:26

## 2024-08-13 ASSESSMENT — PAIN DESCRIPTION - LOCATION
LOCATION: ABDOMEN;FLANK
LOCATION: ABDOMEN
LOCATION: BACK
LOCATION: ABDOMEN

## 2024-08-13 ASSESSMENT — PAIN DESCRIPTION - DESCRIPTORS
DESCRIPTORS: ACHING;CRAMPING
DESCRIPTORS: ACHING;DISCOMFORT

## 2024-08-13 ASSESSMENT — PAIN SCALES - GENERAL
PAINLEVEL_OUTOF10: 10
PAINLEVEL_OUTOF10: 10
PAINLEVEL_OUTOF10: 6
PAINLEVEL_OUTOF10: 4

## 2024-08-13 ASSESSMENT — PAIN DESCRIPTION - PAIN TYPE: TYPE: ACUTE PAIN

## 2024-08-13 ASSESSMENT — PAIN DESCRIPTION - ONSET: ONSET: ON-GOING

## 2024-08-13 ASSESSMENT — PAIN DESCRIPTION - ORIENTATION: ORIENTATION: MID;RIGHT

## 2024-08-13 NOTE — TELEPHONE ENCOUNTER
Medication:   Requested Prescriptions     Pending Prescriptions Disp Refills    omeprazole (PRILOSEC) 20 MG delayed release capsule [Pharmacy Med Name: OMEPRAZOLE 20 MG CPDR 20 Capsule] 28 capsule 1     Sig: TAKE 1 CAPSULE BY MOUTH DAILY        Last Filled:      Patient Phone Number: [phone] (home)     Last appt: 7/21/2023   Next appt: Visit date not found    Last OARRS:       3/30/2023     1:54 PM   RX Monitoring   Periodic Controlled Substance Monitoring Possible medication side effects, risk of tolerance/dependence & alternative treatments discussed.;No signs of potential drug abuse or diversion identified.

## 2024-08-13 NOTE — ED PROVIDER NOTES
8/13/24 1526)   oxyCODONE-acetaminophen (PERCOCET) 5-325 MG per tablet 1 tablet (1 tablet Oral Given 8/13/24 1526)   potassium chloride (KLOR-CON) extended release tablet 20 mEq (20 mEq Oral Given 8/13/24 1742)             [unfilled]    Chronic Conditions affecting care: CKD, DM, CHF, elevated troponins   has a past medical history of Acid reflux, Anemia, Anxiety and depression, Arthritis, Asthma, Atrial fibrillation (formerly [facility]), CAD (coronary artery disease) (12/3/2012), Cerebral artery occlusion with cerebral infarction (formerly [facility]), CHF (congestive heart failure) (formerly [facility]), Chronic kidney disease--stage III, COPD (chronic obstructive pulmonary disease) (formerly [facility]), DM2 (diabetes mellitus, type 2) (formerly [facility]), Dysarthria, Fibromyalgia (6/7/2016), Headache(784.0) (2/19/2013), Hemisensory loss, History of blood transfusion (11/2020), Hyperlipidemia, Hypertension, IBS (irritable bowel syndrome), 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    CONSULTS: (Who and What was discussed)  None    Records Reviewed (External and Source) none    CC/HPI Summary, DDx, ED Course, and Reassessment:     Patient presenting with abdominal and somewhat back discomfort.  Findings to include small bowel fluid filled to a degree without dilation to see suggest enteritis and not evidence of small bowel obstruction.  Patient had troponin 59 and repeat down to 53 I believe related to her renal function which has declined from her previous but not too far from her baseline.  Typically runs troponins in the mid to upper 40s.  I do believe the 1 L fluid given has improved her troponin, so less likely improving renal function.  The patient's BUN 44, creatinine 2.2 and GFR 24.  This is relatively approximation reviewing her previous GFR's noting on July 1, 2024 GFR was 24 just previous was 22.  On July 22, 2022 BUN 45, NA 1 8 and GFR 30.

## 2024-08-13 NOTE — ED NOTES
D/C: Order noted for d/c. Pt confirmed d/c paperwork has correct name. Discharge and education instructions reviewed with patient. Teach-back successful.  Pt verbalized understanding and denied questions at this time. No acute distress noted. Patient instructed to follow-up as noted - return to emergency department if symptoms worsen. Patient verbalized understanding. Discharged per EDMD with discharge instructions. Pt discharged to private vehicle. Patient stable upon departure. Thanked patient for [facility] for care. Provider aware of patient pain at time of discharge.

## 2024-08-13 NOTE — DISCHARGE INSTRUCTIONS
Blood pressure.  While here in the ED.  Your kidney function showing an increase of your creatinine to 2.2 and GFR is dropped to 24.  I recommend stop the losartan and increase your amlodipine up to 10 mg.  Continue your Toprol-XL at 50 mg twice a day.  I would like you to contact your kidney doctor and be seen on Friday to reevaluate your blood pressure and any medication adjustments under their direction.  CT scan showing some fluid in the small bowel to suggest enteritis.  Medicine sent to your pharmacy.

## 2024-08-14 LAB — BACTERIA UR CULT: NORMAL

## 2024-08-15 ENCOUNTER — CARE COORDINATION (OUTPATIENT)
Dept: CARE COORDINATION | Age: 69
End: 2024-08-15

## 2024-08-15 ENCOUNTER — CARE COORDINATION (OUTPATIENT)
Dept: CASE MANAGEMENT | Age: 69
End: 2024-08-15

## 2024-08-15 ENCOUNTER — TELEPHONE (OUTPATIENT)
Dept: PRIMARY CARE CLINIC | Age: 69
End: 2024-08-15

## 2024-08-15 NOTE — CARE COORDINATION
10:06 AM8/15/2024  Patient is currently enrolled in RPM RPM Care Plans: CHF, COPD, and HTN.  Will route to RPM  Oly Johnson NP per RPM protocol for review of alert escalation.    
well. She has taken Amlodipine 5mg, losartan 50mg, imdur 60mg, and hydralazine 25mg today at 0730 this morning. Repeat BP is still elevated at 188/83. She reports she was taken off of metoprolol 50mg due to bradycardia. She has no concerning sx related to her HTN. She states she is compliant with all medications and takes hydralazine TID and has not missed any doses. Her scale appears it is weighing in kilograms possibly, she states the scale is on a hard/flat surface and she is not having any issues balancing when weighing in. Today it showed her weight at 39lbs. Contacted HRS IT to contact pt to rectify scale issue.       Plan/Follow Up: Will continue to review, monitor and address alerts with follow up based on severity of symptoms and risk factors.  **For any new or worsening symptoms or you are concerned in anyway, please contact your Provider or report to the nearest Emergency Room.**

## 2024-08-15 NOTE — CARE COORDINATION
Care Transitions Note    Follow Up Call     Patient Current Location:  Home: [address]  [address]  [place] [address]    Care Transition Nurse contacted the patient by telephone. Verified name and  as identifiers.    Additional needs identified to be addressed with provider   No needs identified                 Method of communication with provider: none.    Care Summary Note: Spoke briefly with Maren. She states she can not talk at this time because she is on the other line with her physician. CT team will follow.          Care Transition Nurse provided contact information.  Plan for follow-up on next business day.  based on severity of symptoms and risk factors.  Plan for next call:  completion of ED f/u phone call      Jyoti Russell RN BSN  Care Transition Nurse  [phone]

## 2024-08-16 ENCOUNTER — CARE COORDINATION (OUTPATIENT)
Dept: CASE MANAGEMENT | Age: 69
End: 2024-08-16

## 2024-08-16 VITALS
BODY MASS INDEX: 7.62 KG/M2 | HEART RATE: 58 BPM | OXYGEN SATURATION: 100 % | WEIGHT: 39 LBS | SYSTOLIC BLOOD PRESSURE: 188 MMHG | DIASTOLIC BLOOD PRESSURE: 83 MMHG

## 2024-08-16 NOTE — TELEPHONE ENCOUNTER
LYDIA to call and schedule [phone] #3  
Please call pt to make appt for BP check  
daily as needed Apply topically 2 times daily.) 5 g 5    docusate sodium (COLACE) 100 MG capsule Take 1 capsule by mouth Every Monday, Wednesday, and Friday         ALLERGIES    Allergies   Allergen Reactions    Bee Venom Shortness Of Breath       RECENT LABS  Lab Results   Component Value Date/Time     08/13/2024 03:10 PM    K 3.3 08/13/2024 03:10 PM     08/13/2024 03:10 PM    CO2 22 08/13/2024 03:10 PM    BUN 44 08/13/2024 03:10 PM    CREATININE 2.2 08/13/2024 03:10 PM    GLUCOSE 172 08/13/2024 03:10 PM    CALCIUM 8.9 08/13/2024 03:10 PM       Lab Results   Component Value Date    WBC 6.4 08/13/2024    HGB 10.0 (L) 08/13/2024    HCT 31.2 (L) 08/13/2024    MCV 93.8 08/13/2024     08/13/2024    LYMPHOPCT 22.6 08/13/2024    RBC 3.33 (L) 08/13/2024    MCH 30.1 08/13/2024    MCHC 32.1 08/13/2024    RDW 17.5 (H) 08/13/2024       Oly Johnson DNP, FNP-C, Remote Patient Monitoring NP, (Ph) [phone]

## 2024-08-16 NOTE — CARE COORDINATION
Care Transitions Note    Follow Up Call     Attempted to reach patient for transitions of care follow up.  Unable to reach patient.      Outreach Attempts:   Multiple attempts to contact patient at phone numbers on file.   HIPAA compliant voicemail left for patient.         Plan for follow-up call in 2-5 days based on severity of symptoms and risk factors. Plan for next call:  B/P  - medication review - [facility], what do they do for Maren? - and assist in obtaining a Nephrology F/U appt.    Jyoti Russell RN BSN  Care Transition Nurse  [phone]

## 2024-08-21 ENCOUNTER — CARE COORDINATION (OUTPATIENT)
Dept: CASE MANAGEMENT | Age: 69
End: 2024-08-21

## 2024-08-21 NOTE — CARE COORDINATION
Care Transitions Note    Follow Up Call     Reason for Admission: Chest Pain - hypertensive urgency     Patient Current Location:  Home: [address]  [address]  [place] [id number]    [facility] Care Coordinator contacted the patient by telephone. Verified name and  as identifiers.    Additional needs identified to be addressed with provider   No needs identified                 Method of communication with provider: none.    Care Summary Note: Spoke with Maren Meza who reported that she is doing a little better. Patient stated that she again went to the ED on Monday. She went to [facility] for elevated BP and HA. Patient stated that her systolic number was over 200. She stated that when she left BP had came down to 147/53 and HR 71. Patient reported that she has a follow up with cardiology on .Patient stated that she has a HA as we speak. She stated that the neurologist ordered a new medication for the HA but she is unsure of the name but she will be picking new medication up today. Patient also stated that sometimes when she eats she gets nausea. Patient stated that they ordered Zofran when at the ED Monday and it has been effective. Patient report that appetite fir to poor because of the nausea and fluid intake is good and denied any problems with bowel or bladder. Patient reported that she is taking all medications as ordered. Patient denied any other needs at this time. Patient instructed to continue to monitor s/s, reporting any that may present to MD immediately for early intervention. Patient is agreeable to f/u calls.    Plan of care updates since last contact:  Education: .  Review of patient management of conditions/medications: .  New medications       Advance Care Planning   The patient has the following advanced directives on file:  Advance Directives       Power of  Living Will ACP-Advance Directive ACP-Power of     Not on File Filed on 20 Filed Not on File

## 2024-08-22 ENCOUNTER — CARE COORDINATION (OUTPATIENT)
Dept: CASE MANAGEMENT | Age: 69
End: 2024-08-22

## 2024-08-22 DIAGNOSIS — I50.32 CHRONIC DIASTOLIC CHF (CONGESTIVE HEART FAILURE), NYHA CLASS 2 (HCC): Primary | Chronic | ICD-10-CM

## 2024-08-22 DIAGNOSIS — I10 ESSENTIAL HYPERTENSION: ICD-10-CM

## 2024-08-22 DIAGNOSIS — J44.9 CHRONIC OBSTRUCTIVE PULMONARY DISEASE, UNSPECIFIED COPD TYPE (HCC): Chronic | ICD-10-CM

## 2024-08-22 NOTE — CARE COORDINATION
Care Transitions Note    Final Call     Patient Current Location:  Home: [facility]  [place] [id number]    Care Transition Nurse contacted the patient by telephone. Verified name and  as identifiers.    Patient graduated from the Care Transitions program on 2024.      Handoff:   Patient was not referred to the ACM team due to patient not affiliated with [facility].       Care Summary Note: Maren states she is now having her care provided by [facility] and is no longer seeing a [facility] physician. She will be following up with them and will be sending back the RPM equipment.    Assessments:  Care Transitions Subsequent and Final Call    Subsequent and Final Calls  Are you currently active with any services?: Home Health  Care Transitions Interventions  Other Interventions:              Upcoming Appointments:        Patient has agreed to contact primary care provider and/or specialist for any further questions, concerns, or needs.    Jyoti Russell RN BSN  Care Transition Nurse  [phone]

## 2024-08-22 NOTE — PROGRESS NOTES
Remote Patient Order Discontinued    Received request from Jyoti Russell RN   to discontinue order for remote patient monitoring of CHF, COPD, and HTN and order completed.

## 2024-08-23 ENCOUNTER — CARE COORDINATION (OUTPATIENT)
Dept: PRIMARY CARE CLINIC | Age: 69
End: 2024-08-23

## 2024-08-23 NOTE — CARE COORDINATION
Patient Maren Meza  08/23/24     Care Coordination  placed call to patient to arrange RPM kit  through [employer]. Left HIPAA Compliant Message     provided return and how to pack equipment in original packing via the patients voicemail if available and provided call back number should patient have questions.    Patient made aware [employer] will  equipment in 2-4 days.

## 2024-08-23 NOTE — DISCHARGE INSTR - COC
Non clinical:  Please reach out to patient and advise of FIT test (stool test) that was ordered and this needs to be completed per Perez prior to placing colonoscopy order.     Fasting labs are still needed as well.   Thank you!!   {Prognosis:9679691255}    Condition at Discharge: { Patient Condition:578649414}    Rehab Potential (if transferring to Rehab): {Prognosis:3202129957}    Recommended Labs or Other Treatments After Discharge: ***    Physician Certification: I certify the above information and transfer of Maren Meza  is necessary for the continuing treatment of the diagnosis listed and that she requires {Admit to Appropriate Level of Care:44256} for {GREATER/LESS:975944587} 30 days.     Update Admission H&P: {CHP DME Changes in HandP:280453070}    PHYSICIAN SIGNATURE:  {Esignature:[id number]}

## 2024-09-11 ENCOUNTER — TELEPHONE (OUTPATIENT)
Dept: PRIMARY CARE CLINIC | Age: 69
End: 2024-09-11

## 2024-09-15 ENCOUNTER — APPOINTMENT (OUTPATIENT)
Dept: GENERAL RADIOLOGY | Age: 69
End: 2024-09-15
Payer: COMMERCIAL

## 2024-09-15 ENCOUNTER — HOSPITAL ENCOUNTER (EMERGENCY)
Age: 69
Discharge: HOME | End: 2024-09-15
Attending: EMERGENCY MEDICINE
Payer: COMMERCIAL

## 2024-09-15 ENCOUNTER — APPOINTMENT (OUTPATIENT)
Dept: CT IMAGING | Age: 69
End: 2024-09-15
Payer: COMMERCIAL

## 2024-09-15 VITALS
RESPIRATION RATE: 13 BRPM | HEIGHT: 60 IN | BODY MASS INDEX: 21.99 KG/M2 | OXYGEN SATURATION: 100 % | WEIGHT: 111.99 LBS | SYSTOLIC BLOOD PRESSURE: 152 MMHG | HEART RATE: 61 BPM | TEMPERATURE: 99.4 F | DIASTOLIC BLOOD PRESSURE: 66 MMHG

## 2024-09-15 DIAGNOSIS — I10 ELEVATED BLOOD PRESSURE READING IN OFFICE WITH DIAGNOSIS OF HYPERTENSION: ICD-10-CM

## 2024-09-15 DIAGNOSIS — R07.9 CHRONIC CHEST PAIN: ICD-10-CM

## 2024-09-15 DIAGNOSIS — R51.9 ACUTE NONINTRACTABLE HEADACHE, UNSPECIFIED HEADACHE TYPE: Primary | ICD-10-CM

## 2024-09-15 DIAGNOSIS — G89.29 CHRONIC CHEST PAIN: ICD-10-CM

## 2024-09-15 LAB
ALBUMIN SERPL-MCNC: 3.8 G/DL (ref 3.4–5)
ALBUMIN/GLOB SERPL: 1.1 {RATIO} (ref 1.1–2.2)
ALP SERPL-CCNC: 118 U/L (ref 40–129)
ALT SERPL-CCNC: 18 U/L (ref 10–40)
ANION GAP SERPL CALCULATED.3IONS-SCNC: 14 MMOL/L (ref 3–16)
AST SERPL-CCNC: 22 U/L (ref 15–37)
BASOPHILS # BLD: 0 K/UL (ref 0–0.2)
BASOPHILS NFR BLD: 0.4 %
BILIRUB SERPL-MCNC: <0.2 MG/DL (ref 0–1)
BUN SERPL-MCNC: 42 MG/DL (ref 7–20)
CALCIUM SERPL-MCNC: 9.8 MG/DL (ref 8.3–10.6)
CHLORIDE SERPL-SCNC: 103 MMOL/L (ref 99–110)
CO2 SERPL-SCNC: 20 MMOL/L (ref 21–32)
CREAT SERPL-MCNC: 1.7 MG/DL (ref 0.6–1.2)
DEPRECATED RDW RBC AUTO: 16.3 % (ref 12.4–15.4)
EOSINOPHIL # BLD: 0 K/UL (ref 0–0.6)
EOSINOPHIL NFR BLD: 0.2 %
GFR SERPLBLD CREATININE-BSD FMLA CKD-EPI: 32 ML/MIN/{1.73_M2}
GLUCOSE SERPL-MCNC: 221 MG/DL (ref 70–99)
HCT VFR BLD AUTO: 30.9 % (ref 36–48)
HGB BLD-MCNC: 10.5 G/DL (ref 12–16)
INR PPP: 1.1 (ref 0.85–1.15)
LYMPHOCYTES # BLD: 0.7 K/UL (ref 1–5.1)
LYMPHOCYTES NFR BLD: 11.5 %
MCH RBC QN AUTO: 32 PG (ref 26–34)
MCHC RBC AUTO-ENTMCNC: 34 G/DL (ref 31–36)
MCV RBC AUTO: 94.3 FL (ref 80–100)
MONOCYTES # BLD: 0.1 K/UL (ref 0–1.3)
MONOCYTES NFR BLD: 1.8 %
NEUTROPHILS # BLD: 4.9 K/UL (ref 1.7–7.7)
NEUTROPHILS NFR BLD: 86.1 %
NT-PROBNP SERPL-MCNC: 1893 PG/ML (ref 0–124)
PLATELET # BLD AUTO: 238 K/UL (ref 135–450)
PLATELET BLD QL SMEAR: ADEQUATE
PMV BLD AUTO: 7.9 FL (ref 5–10.5)
POTASSIUM SERPL-SCNC: 4.6 MMOL/L (ref 3.5–5.1)
PROT SERPL-MCNC: 7.3 G/DL (ref 6.4–8.2)
PROTHROMBIN TIME: 14.4 SEC (ref 11.9–14.9)
RBC # BLD AUTO: 3.28 M/UL (ref 4–5.2)
SARS-COV-2 RDRP RESP QL NAA+PROBE: NOT DETECTED
SLIDE REVIEW: ABNORMAL
SODIUM SERPL-SCNC: 137 MMOL/L (ref 136–145)
TROPONIN, HIGH SENSITIVITY: 31 NG/L (ref 0–14)
WBC # BLD AUTO: 5.7 K/UL (ref 4–11)

## 2024-09-15 PROCEDURE — 87635 SARS-COV-2 COVID-19 AMP PRB: CPT

## 2024-09-15 PROCEDURE — 84484 ASSAY OF TROPONIN QUANT: CPT

## 2024-09-15 PROCEDURE — 85610 PROTHROMBIN TIME: CPT

## 2024-09-15 PROCEDURE — 83880 ASSAY OF NATRIURETIC PEPTIDE: CPT

## 2024-09-15 PROCEDURE — 36415 COLL VENOUS BLD VENIPUNCTURE: CPT

## 2024-09-15 PROCEDURE — 71046 X-RAY EXAM CHEST 2 VIEWS: CPT

## 2024-09-15 PROCEDURE — 70450 CT HEAD/BRAIN W/O DYE: CPT

## 2024-09-15 PROCEDURE — 6360000002 HC RX W HCPCS: Performed by: PHYSICIAN ASSISTANT

## 2024-09-15 PROCEDURE — 6370000000 HC RX 637 (ALT 250 FOR IP): Performed by: EMERGENCY MEDICINE

## 2024-09-15 PROCEDURE — 99284 EMERGENCY DEPT VISIT MOD MDM: CPT

## 2024-09-15 PROCEDURE — 85025 COMPLETE CBC W/AUTO DIFF WBC: CPT

## 2024-09-15 PROCEDURE — 96374 THER/PROPH/DIAG INJ IV PUSH: CPT

## 2024-09-15 PROCEDURE — 6370000000 HC RX 637 (ALT 250 FOR IP): Performed by: PHYSICIAN ASSISTANT

## 2024-09-15 PROCEDURE — 93005 ELECTROCARDIOGRAM TRACING: CPT | Performed by: EMERGENCY MEDICINE

## 2024-09-15 PROCEDURE — 80053 COMPREHEN METABOLIC PANEL: CPT

## 2024-09-15 RX ORDER — BUTALBITAL, ACETAMINOPHEN AND CAFFEINE 300; 40; 50 MG/1; MG/1; MG/1
1 CAPSULE ORAL EVERY 4 HOURS PRN
Qty: 20 CAPSULE | Refills: 0 | Status: SHIPPED | OUTPATIENT
Start: 2024-09-15

## 2024-09-15 RX ORDER — BUTALBITAL, ACETAMINOPHEN AND CAFFEINE 50; 325; 40 MG/1; MG/1; MG/1
2 TABLET ORAL ONCE
Status: COMPLETED | OUTPATIENT
Start: 2024-09-15 | End: 2024-09-15

## 2024-09-15 RX ORDER — FENTANYL CITRATE 50 UG/ML
50 INJECTION, SOLUTION INTRAMUSCULAR; INTRAVENOUS ONCE
Status: COMPLETED | OUTPATIENT
Start: 2024-09-15 | End: 2024-09-15

## 2024-09-15 RX ORDER — IOPAMIDOL 755 MG/ML
75 INJECTION, SOLUTION INTRAVASCULAR
Status: DISCONTINUED | OUTPATIENT
Start: 2024-09-15 | End: 2024-09-16 | Stop reason: HOSPADM

## 2024-09-15 RX ORDER — ONDANSETRON 4 MG/1
8 TABLET, ORALLY DISINTEGRATING ORAL ONCE
Status: COMPLETED | OUTPATIENT
Start: 2024-09-15 | End: 2024-09-15

## 2024-09-15 RX ORDER — HYDROCODONE BITARTRATE AND ACETAMINOPHEN 5; 325 MG/1; MG/1
1 TABLET ORAL EVERY 6 HOURS PRN
Qty: 12 TABLET | Refills: 0 | Status: SHIPPED | OUTPATIENT
Start: 2024-09-15 | End: 2024-09-15

## 2024-09-15 RX ORDER — CLINDAMYCIN HCL 300 MG
300 CAPSULE ORAL 3 TIMES DAILY
Qty: 30 CAPSULE | Refills: 0 | Status: SHIPPED | OUTPATIENT
Start: 2024-09-15 | End: 2024-09-15

## 2024-09-15 RX ORDER — CLONIDINE HYDROCHLORIDE 0.1 MG/1
0.1 TABLET ORAL ONCE
Status: COMPLETED | OUTPATIENT
Start: 2024-09-15 | End: 2024-09-15

## 2024-09-15 RX ADMIN — FENTANYL CITRATE 50 MCG: 50 INJECTION INTRAMUSCULAR; INTRAVENOUS at 19:07

## 2024-09-15 RX ADMIN — BUTALBITAL, ACETAMINOPHEN, AND CAFFEINE 2 TABLET: 50; 325; 40 TABLET ORAL at 21:33

## 2024-09-15 RX ADMIN — CLONIDINE HYDROCHLORIDE 0.1 MG: 0.1 TABLET ORAL at 19:08

## 2024-09-15 RX ADMIN — ONDANSETRON 8 MG: 4 TABLET, ORALLY DISINTEGRATING ORAL at 21:31

## 2024-09-15 ASSESSMENT — PAIN DESCRIPTION - DESCRIPTORS
DESCRIPTORS: DISCOMFORT
DESCRIPTORS: DISCOMFORT
DESCRIPTORS: CRUSHING;DISCOMFORT

## 2024-09-15 ASSESSMENT — PAIN DESCRIPTION - LOCATION
LOCATION: HEAD
LOCATION: CHEST
LOCATION_2: HEAD
LOCATION: CHEST
LOCATION_2: HEAD
LOCATION: HEAD

## 2024-09-15 ASSESSMENT — PAIN DESCRIPTION - INTENSITY
RATING_2: 10
RATING_2: 5
RATING_2: 10

## 2024-09-15 ASSESSMENT — PAIN SCALES - GENERAL
PAINLEVEL_OUTOF10: 8
PAINLEVEL_OUTOF10: 10
PAINLEVEL_OUTOF10: 0
PAINLEVEL_OUTOF10: 10

## 2024-09-15 ASSESSMENT — PAIN DESCRIPTION - ORIENTATION
ORIENTATION: LEFT

## 2024-09-15 ASSESSMENT — PAIN DESCRIPTION - FREQUENCY: FREQUENCY: CONTINUOUS

## 2024-09-15 ASSESSMENT — PAIN - FUNCTIONAL ASSESSMENT
PAIN_FUNCTIONAL_ASSESSMENT: NONE - DENIES PAIN
PAIN_FUNCTIONAL_ASSESSMENT_SITE2: ACTIVITIES ARE NOT PREVENTED
PAIN_FUNCTIONAL_ASSESSMENT: PREVENTS OR INTERFERES SOME ACTIVE ACTIVITIES AND ADLS

## 2024-09-15 ASSESSMENT — PAIN DESCRIPTION - PAIN TYPE: TYPE: ACUTE PAIN

## 2024-09-15 ASSESSMENT — PAIN DESCRIPTION - ONSET: ONSET: ON-GOING

## 2024-09-17 ENCOUNTER — HOSPITAL ENCOUNTER (EMERGENCY)
Age: 69
Discharge: HOME | End: 2024-09-17
Payer: COMMERCIAL

## 2024-09-17 ENCOUNTER — APPOINTMENT (OUTPATIENT)
Dept: CT IMAGING | Age: 69
End: 2024-09-17
Payer: COMMERCIAL

## 2024-09-17 VITALS
DIASTOLIC BLOOD PRESSURE: 76 MMHG | HEIGHT: 60 IN | RESPIRATION RATE: 14 BRPM | HEART RATE: 65 BPM | BODY MASS INDEX: 22.03 KG/M2 | TEMPERATURE: 98.7 F | OXYGEN SATURATION: 100 % | WEIGHT: 112.21 LBS | SYSTOLIC BLOOD PRESSURE: 178 MMHG

## 2024-09-17 DIAGNOSIS — R51.9 HEADACHE, UNSPECIFIED HEADACHE TYPE: Primary | ICD-10-CM

## 2024-09-17 LAB
ANION GAP SERPL CALCULATED.3IONS-SCNC: 11 MMOL/L (ref 3–16)
BACTERIA URNS QL MICRO: NORMAL /HPF
BASOPHILS # BLD: 0 K/UL (ref 0–0.2)
BASOPHILS NFR BLD: 0.7 %
BILIRUB UR QL STRIP.AUTO: NEGATIVE
BUN SERPL-MCNC: 41 MG/DL (ref 7–20)
CALCIUM SERPL-MCNC: 9.4 MG/DL (ref 8.3–10.6)
CHLORIDE SERPL-SCNC: 107 MMOL/L (ref 99–110)
CLARITY UR: CLEAR
CO2 SERPL-SCNC: 19 MMOL/L (ref 21–32)
COLOR UR: YELLOW
CREAT SERPL-MCNC: 2 MG/DL (ref 0.6–1.2)
DEPRECATED RDW RBC AUTO: 16.6 % (ref 12.4–15.4)
EOSINOPHIL # BLD: 0.2 K/UL (ref 0–0.6)
EOSINOPHIL NFR BLD: 3.3 %
EPI CELLS #/AREA URNS AUTO: 1 /HPF (ref 0–5)
GFR SERPLBLD CREATININE-BSD FMLA CKD-EPI: 27 ML/MIN/{1.73_M2}
GLUCOSE SERPL-MCNC: 113 MG/DL (ref 70–99)
GLUCOSE UR STRIP.AUTO-MCNC: NEGATIVE MG/DL
HCT VFR BLD AUTO: 26.6 % (ref 36–48)
HGB BLD-MCNC: 8.5 G/DL (ref 12–16)
HGB UR QL STRIP.AUTO: NEGATIVE
HYALINE CASTS #/AREA URNS AUTO: 0 /LPF (ref 0–8)
KETONES UR STRIP.AUTO-MCNC: NEGATIVE MG/DL
LEUKOCYTE ESTERASE UR QL STRIP.AUTO: NEGATIVE
LYMPHOCYTES # BLD: 1.5 K/UL (ref 1–5.1)
LYMPHOCYTES NFR BLD: 25.2 %
MCH RBC QN AUTO: 30.6 PG (ref 26–34)
MCHC RBC AUTO-ENTMCNC: 32 G/DL (ref 31–36)
MCV RBC AUTO: 95.6 FL (ref 80–100)
MONOCYTES # BLD: 0.3 K/UL (ref 0–1.3)
MONOCYTES NFR BLD: 5.4 %
NEUTROPHILS # BLD: 4 K/UL (ref 1.7–7.7)
NEUTROPHILS NFR BLD: 65.4 %
NITRITE UR QL STRIP.AUTO: NEGATIVE
PH UR STRIP.AUTO: 5 [PH] (ref 5–8)
PLATELET # BLD AUTO: 196 K/UL (ref 135–450)
PMV BLD AUTO: 7.9 FL (ref 5–10.5)
POTASSIUM SERPL-SCNC: 4.1 MMOL/L (ref 3.5–5.1)
PROT UR STRIP.AUTO-MCNC: 100 MG/DL
RBC # BLD AUTO: 2.78 M/UL (ref 4–5.2)
RBC CLUMPS #/AREA URNS AUTO: 0 /HPF (ref 0–4)
SODIUM SERPL-SCNC: 137 MMOL/L (ref 136–145)
SP GR UR STRIP.AUTO: 1.02 (ref 1–1.03)
TROPONIN, HIGH SENSITIVITY: 48 NG/L (ref 0–14)
TROPONIN, HIGH SENSITIVITY: 54 NG/L (ref 0–14)
UA COMPLETE W REFLEX CULTURE PNL UR: ABNORMAL
UA DIPSTICK W REFLEX MICRO PNL UR: YES
URN SPEC COLLECT METH UR: ABNORMAL
UROBILINOGEN UR STRIP-ACNC: 0.2 E.U./DL
WBC # BLD AUTO: 6.1 K/UL (ref 4–11)
WBC #/AREA URNS AUTO: 1 /HPF (ref 0–5)

## 2024-09-17 PROCEDURE — 96374 THER/PROPH/DIAG INJ IV PUSH: CPT

## 2024-09-17 PROCEDURE — 6360000002 HC RX W HCPCS: Performed by: PHYSICIAN ASSISTANT

## 2024-09-17 PROCEDURE — 6370000000 HC RX 637 (ALT 250 FOR IP): Performed by: PHYSICIAN ASSISTANT

## 2024-09-17 PROCEDURE — 81001 URINALYSIS AUTO W/SCOPE: CPT

## 2024-09-17 PROCEDURE — 85025 COMPLETE CBC W/AUTO DIFF WBC: CPT

## 2024-09-17 PROCEDURE — 99284 EMERGENCY DEPT VISIT MOD MDM: CPT

## 2024-09-17 PROCEDURE — 80048 BASIC METABOLIC PNL TOTAL CA: CPT

## 2024-09-17 PROCEDURE — 93005 ELECTROCARDIOGRAM TRACING: CPT | Performed by: PHYSICIAN ASSISTANT

## 2024-09-17 PROCEDURE — 84484 ASSAY OF TROPONIN QUANT: CPT

## 2024-09-17 PROCEDURE — 2580000003 HC RX 258: Performed by: PHYSICIAN ASSISTANT

## 2024-09-17 PROCEDURE — 96372 THER/PROPH/DIAG INJ SC/IM: CPT

## 2024-09-17 RX ORDER — METOCLOPRAMIDE HYDROCHLORIDE 5 MG/ML
10 INJECTION INTRAMUSCULAR; INTRAVENOUS ONCE
Status: DISCONTINUED | OUTPATIENT
Start: 2024-09-17 | End: 2024-09-17

## 2024-09-17 RX ORDER — 0.9 % SODIUM CHLORIDE 0.9 %
1000 INTRAVENOUS SOLUTION INTRAVENOUS ONCE
Status: COMPLETED | OUTPATIENT
Start: 2024-09-17 | End: 2024-09-17

## 2024-09-17 RX ORDER — DIPHENHYDRAMINE HYDROCHLORIDE 50 MG/ML
50 INJECTION INTRAMUSCULAR; INTRAVENOUS ONCE
Status: COMPLETED | OUTPATIENT
Start: 2024-09-17 | End: 2024-09-17

## 2024-09-17 RX ORDER — DIPHENHYDRAMINE HYDROCHLORIDE 50 MG/ML
25 INJECTION INTRAMUSCULAR; INTRAVENOUS ONCE
Status: DISCONTINUED | OUTPATIENT
Start: 2024-09-17 | End: 2024-09-17

## 2024-09-17 RX ORDER — METOCLOPRAMIDE HYDROCHLORIDE 5 MG/ML
10 INJECTION INTRAMUSCULAR; INTRAVENOUS ONCE
Status: COMPLETED | OUTPATIENT
Start: 2024-09-17 | End: 2024-09-17

## 2024-09-17 RX ORDER — DEXAMETHASONE SODIUM PHOSPHATE 4 MG/ML
6 INJECTION, SOLUTION INTRA-ARTICULAR; INTRALESIONAL; INTRAMUSCULAR; INTRAVENOUS; SOFT TISSUE ONCE
Status: DISCONTINUED | OUTPATIENT
Start: 2024-09-17 | End: 2024-09-17

## 2024-09-17 RX ORDER — FENTANYL CITRATE 50 UG/ML
50 INJECTION, SOLUTION INTRAMUSCULAR; INTRAVENOUS ONCE
Status: COMPLETED | OUTPATIENT
Start: 2024-09-17 | End: 2024-09-17

## 2024-09-17 RX ORDER — BUTALBITAL, ACETAMINOPHEN AND CAFFEINE 50; 325; 40 MG/1; MG/1; MG/1
2 TABLET ORAL ONCE
Status: COMPLETED | OUTPATIENT
Start: 2024-09-17 | End: 2024-09-17

## 2024-09-17 RX ORDER — OXYCODONE AND ACETAMINOPHEN 5; 325 MG/1; MG/1
1 TABLET ORAL ONCE
Status: COMPLETED | OUTPATIENT
Start: 2024-09-17 | End: 2024-09-17

## 2024-09-17 RX ORDER — DEXAMETHASONE SODIUM PHOSPHATE 4 MG/ML
6 INJECTION, SOLUTION INTRA-ARTICULAR; INTRALESIONAL; INTRAMUSCULAR; INTRAVENOUS; SOFT TISSUE ONCE
Status: COMPLETED | OUTPATIENT
Start: 2024-09-17 | End: 2024-09-17

## 2024-09-17 RX ADMIN — DIPHENHYDRAMINE HYDROCHLORIDE 50 MG: 50 INJECTION INTRAMUSCULAR; INTRAVENOUS at 16:16

## 2024-09-17 RX ADMIN — METOCLOPRAMIDE 10 MG: 5 INJECTION, SOLUTION INTRAMUSCULAR; INTRAVENOUS at 16:16

## 2024-09-17 RX ADMIN — DEXAMETHASONE SODIUM PHOSPHATE 6 MG: 4 INJECTION INTRA-ARTICULAR; INTRALESIONAL; INTRAMUSCULAR; INTRAVENOUS; SOFT TISSUE at 16:16

## 2024-09-17 RX ADMIN — BUTALBITAL, ACETAMINOPHEN AND CAFFEINE 2 TABLET: 325; 50; 40 TABLET ORAL at 19:00

## 2024-09-17 RX ADMIN — OXYCODONE HYDROCHLORIDE AND ACETAMINOPHEN 1 TABLET: 5; 325 TABLET ORAL at 15:28

## 2024-09-17 RX ADMIN — FENTANYL CITRATE 50 MCG: 50 INJECTION INTRAMUSCULAR; INTRAVENOUS at 17:58

## 2024-09-17 RX ADMIN — SODIUM CHLORIDE 1000 ML: 9 INJECTION, SOLUTION INTRAVENOUS at 16:42

## 2024-09-17 ASSESSMENT — PAIN DESCRIPTION - DESCRIPTORS
DESCRIPTORS: ACHING
DESCRIPTORS: ACHING
DESCRIPTORS: ACHING;SHARP;NAGGING

## 2024-09-17 ASSESSMENT — PAIN DESCRIPTION - LOCATION
LOCATION: HEAD

## 2024-09-17 ASSESSMENT — PAIN DESCRIPTION - ORIENTATION: ORIENTATION: RIGHT

## 2024-09-17 ASSESSMENT — PAIN DESCRIPTION - FREQUENCY: FREQUENCY: CONTINUOUS

## 2024-09-17 ASSESSMENT — PAIN DESCRIPTION - ONSET: ONSET: ON-GOING

## 2024-09-17 ASSESSMENT — PAIN DESCRIPTION - PAIN TYPE: TYPE: ACUTE PAIN

## 2024-09-17 ASSESSMENT — PAIN SCALES - GENERAL
PAINLEVEL_OUTOF10: 10
PAINLEVEL_OUTOF10: 10
PAINLEVEL_OUTOF10: 8
PAINLEVEL_OUTOF10: 10

## 2024-09-17 ASSESSMENT — PAIN - FUNCTIONAL ASSESSMENT
PAIN_FUNCTIONAL_ASSESSMENT: 0-10
PAIN_FUNCTIONAL_ASSESSMENT: ACTIVITIES ARE NOT PREVENTED

## 2024-09-18 LAB
EKG ATRIAL RATE: 64 BPM
EKG ATRIAL RATE: 70 BPM
EKG ATRIAL RATE: 70 BPM
EKG DIAGNOSIS: NORMAL
EKG P AXIS: -16 DEGREES
EKG P AXIS: -16 DEGREES
EKG P AXIS: 23 DEGREES
EKG P-R INTERVAL: 104 MS
EKG P-R INTERVAL: 104 MS
EKG P-R INTERVAL: 160 MS
EKG Q-T INTERVAL: 426 MS
EKG Q-T INTERVAL: 430 MS
EKG Q-T INTERVAL: 430 MS
EKG QRS DURATION: 88 MS
EKG QRS DURATION: 88 MS
EKG QRS DURATION: 90 MS
EKG QTC CALCULATION (BAZETT): 439 MS
EKG QTC CALCULATION (BAZETT): 464 MS
EKG QTC CALCULATION (BAZETT): 464 MS
EKG R AXIS: 28 DEGREES
EKG R AXIS: 28 DEGREES
EKG R AXIS: 61 DEGREES
EKG T AXIS: 75 DEGREES
EKG VENTRICULAR RATE: 64 BPM
EKG VENTRICULAR RATE: 70 BPM
EKG VENTRICULAR RATE: 70 BPM

## 2024-09-18 PROCEDURE — 93010 ELECTROCARDIOGRAM REPORT: CPT | Performed by: STUDENT IN AN ORGANIZED HEALTH CARE EDUCATION/TRAINING PROGRAM

## 2024-10-18 ENCOUNTER — APPOINTMENT (OUTPATIENT)
Dept: GENERAL RADIOLOGY | Age: 69
End: 2024-10-18
Payer: COMMERCIAL

## 2024-10-18 ENCOUNTER — HOSPITAL ENCOUNTER (OUTPATIENT)
Age: 69
Setting detail: OBSERVATION
Discharge: HOME | End: 2024-10-19
Attending: INTERNAL MEDICINE | Admitting: INTERNAL MEDICINE
Payer: COMMERCIAL

## 2024-10-18 DIAGNOSIS — Z79.4 TYPE 2 DIABETES MELLITUS WITH OTHER CIRCULATORY COMPLICATION, WITH LONG-TERM CURRENT USE OF INSULIN (HCC): ICD-10-CM

## 2024-10-18 DIAGNOSIS — Z86.79 HISTORY OF CORONARY ARTERY DISEASE: ICD-10-CM

## 2024-10-18 DIAGNOSIS — R07.9 LEFT-SIDED CHEST PAIN: Primary | ICD-10-CM

## 2024-10-18 DIAGNOSIS — E11.59 TYPE 2 DIABETES MELLITUS WITH OTHER CIRCULATORY COMPLICATION, WITH LONG-TERM CURRENT USE OF INSULIN (HCC): ICD-10-CM

## 2024-10-18 LAB
ALBUMIN SERPL-MCNC: 4.1 G/DL (ref 3.4–5)
ALBUMIN/GLOB SERPL: 1.1 {RATIO} (ref 1.1–2.2)
ALP SERPL-CCNC: 123 U/L (ref 40–129)
ALT SERPL-CCNC: 52 U/L (ref 10–40)
ANION GAP SERPL CALCULATED.3IONS-SCNC: 14 MMOL/L (ref 3–16)
AST SERPL-CCNC: 72 U/L (ref 15–37)
BASOPHILS # BLD: 0.1 K/UL (ref 0–0.2)
BASOPHILS NFR BLD: 1.2 %
BILIRUB SERPL-MCNC: <0.2 MG/DL (ref 0–1)
BUN SERPL-MCNC: 41 MG/DL (ref 7–20)
CALCIUM SERPL-MCNC: 9.9 MG/DL (ref 8.3–10.6)
CHLORIDE SERPL-SCNC: 107 MMOL/L (ref 99–110)
CO2 SERPL-SCNC: 18 MMOL/L (ref 21–32)
CREAT SERPL-MCNC: 1.8 MG/DL (ref 0.6–1.2)
DEPRECATED RDW RBC AUTO: 15.8 % (ref 12.4–15.4)
EKG DIAGNOSIS: NORMAL
EKG Q-T INTERVAL: 442 MS
EKG QRS DURATION: 84 MS
EKG QTC CALCULATION (BAZETT): 426 MS
EKG R AXIS: 56 DEGREES
EKG T AXIS: 99 DEGREES
EKG VENTRICULAR RATE: 56 BPM
EOSINOPHIL # BLD: 0.1 K/UL (ref 0–0.6)
EOSINOPHIL NFR BLD: 2.3 %
GFR SERPLBLD CREATININE-BSD FMLA CKD-EPI: 30 ML/MIN/{1.73_M2}
GLUCOSE BLD-MCNC: 150 MG/DL (ref 70–99)
GLUCOSE SERPL-MCNC: 168 MG/DL (ref 70–99)
HCT VFR BLD AUTO: 32.1 % (ref 36–48)
HGB BLD-MCNC: 10.5 G/DL (ref 12–16)
LYMPHOCYTES # BLD: 1.3 K/UL (ref 1–5.1)
LYMPHOCYTES NFR BLD: 21.2 %
MCH RBC QN AUTO: 31.7 PG (ref 26–34)
MCHC RBC AUTO-ENTMCNC: 32.6 G/DL (ref 31–36)
MCV RBC AUTO: 97.3 FL (ref 80–100)
MONOCYTES # BLD: 0.3 K/UL (ref 0–1.3)
MONOCYTES NFR BLD: 4.2 %
NEUTROPHILS # BLD: 4.4 K/UL (ref 1.7–7.7)
NEUTROPHILS NFR BLD: 71.1 %
NT-PROBNP SERPL-MCNC: 3746 PG/ML (ref 0–124)
PERFORMED ON: ABNORMAL
PLATELET # BLD AUTO: 207 K/UL (ref 135–450)
PMV BLD AUTO: 8.4 FL (ref 5–10.5)
POTASSIUM SERPL-SCNC: 3.8 MMOL/L (ref 3.5–5.1)
PROT SERPL-MCNC: 7.7 G/DL (ref 6.4–8.2)
RBC # BLD AUTO: 3.3 M/UL (ref 4–5.2)
SODIUM SERPL-SCNC: 139 MMOL/L (ref 136–145)
TROPONIN, HIGH SENSITIVITY: 33 NG/L (ref 0–14)
TROPONIN, HIGH SENSITIVITY: 35 NG/L (ref 0–14)
TROPONIN, HIGH SENSITIVITY: 41 NG/L (ref 0–14)
TROPONIN, HIGH SENSITIVITY: 48 NG/L (ref 0–14)
WBC # BLD AUTO: 6.2 K/UL (ref 4–11)

## 2024-10-18 PROCEDURE — 84484 ASSAY OF TROPONIN QUANT: CPT

## 2024-10-18 PROCEDURE — 6370000000 HC RX 637 (ALT 250 FOR IP): Performed by: PHYSICIAN ASSISTANT

## 2024-10-18 PROCEDURE — 93010 ELECTROCARDIOGRAM REPORT: CPT | Performed by: INTERNAL MEDICINE

## 2024-10-18 PROCEDURE — 85025 COMPLETE CBC W/AUTO DIFF WBC: CPT

## 2024-10-18 PROCEDURE — 96374 THER/PROPH/DIAG INJ IV PUSH: CPT

## 2024-10-18 PROCEDURE — 6370000000 HC RX 637 (ALT 250 FOR IP): Performed by: INTERNAL MEDICINE

## 2024-10-18 PROCEDURE — 36415 COLL VENOUS BLD VENIPUNCTURE: CPT

## 2024-10-18 PROCEDURE — 80053 COMPREHEN METABOLIC PANEL: CPT

## 2024-10-18 PROCEDURE — 6360000002 HC RX W HCPCS: Performed by: PHYSICIAN ASSISTANT

## 2024-10-18 PROCEDURE — 99285 EMERGENCY DEPT VISIT HI MDM: CPT

## 2024-10-18 PROCEDURE — 93005 ELECTROCARDIOGRAM TRACING: CPT | Performed by: PHYSICIAN ASSISTANT

## 2024-10-18 PROCEDURE — G0378 HOSPITAL OBSERVATION PER HR: HCPCS

## 2024-10-18 PROCEDURE — 71046 X-RAY EXAM CHEST 2 VIEWS: CPT

## 2024-10-18 PROCEDURE — 93005 ELECTROCARDIOGRAM TRACING: CPT | Performed by: INTERNAL MEDICINE

## 2024-10-18 PROCEDURE — 83880 ASSAY OF NATRIURETIC PEPTIDE: CPT

## 2024-10-18 PROCEDURE — 2580000003 HC RX 258: Performed by: INTERNAL MEDICINE

## 2024-10-18 RX ORDER — METOPROLOL SUCCINATE 50 MG/1
50 TABLET, EXTENDED RELEASE ORAL 2 TIMES DAILY
Status: DISCONTINUED | OUTPATIENT
Start: 2024-10-18 | End: 2024-10-19 | Stop reason: HOSPADM

## 2024-10-18 RX ORDER — PANTOPRAZOLE SODIUM 40 MG/1
40 TABLET, DELAYED RELEASE ORAL
Status: DISCONTINUED | OUTPATIENT
Start: 2024-10-19 | End: 2024-10-19 | Stop reason: HOSPADM

## 2024-10-18 RX ORDER — ONDANSETRON 4 MG/1
4 TABLET, ORALLY DISINTEGRATING ORAL EVERY 8 HOURS PRN
Status: DISCONTINUED | OUTPATIENT
Start: 2024-10-18 | End: 2024-10-19 | Stop reason: HOSPADM

## 2024-10-18 RX ORDER — ONDANSETRON 2 MG/ML
4 INJECTION INTRAMUSCULAR; INTRAVENOUS ONCE
Status: COMPLETED | OUTPATIENT
Start: 2024-10-18 | End: 2024-10-18

## 2024-10-18 RX ORDER — GLUCAGON 1 MG/ML
1 KIT INJECTION PRN
Status: DISCONTINUED | OUTPATIENT
Start: 2024-10-18 | End: 2024-10-19 | Stop reason: HOSPADM

## 2024-10-18 RX ORDER — PREDNISONE 5 MG/1
10 TABLET ORAL DAILY
Status: DISCONTINUED | OUTPATIENT
Start: 2024-10-19 | End: 2024-10-19 | Stop reason: HOSPADM

## 2024-10-18 RX ORDER — LEVETIRACETAM 500 MG/1
500 TABLET ORAL 2 TIMES DAILY
Status: DISCONTINUED | OUTPATIENT
Start: 2024-10-18 | End: 2024-10-19 | Stop reason: HOSPADM

## 2024-10-18 RX ORDER — NITROGLYCERIN 0.4 MG/1
0.4 TABLET SUBLINGUAL EVERY 5 MIN PRN
Status: DISCONTINUED | OUTPATIENT
Start: 2024-10-18 | End: 2024-10-19 | Stop reason: HOSPADM

## 2024-10-18 RX ORDER — AMITRIPTYLINE HYDROCHLORIDE 10 MG/1
40 TABLET ORAL NIGHTLY
Status: DISCONTINUED | OUTPATIENT
Start: 2024-10-18 | End: 2024-10-19 | Stop reason: HOSPADM

## 2024-10-18 RX ORDER — QUETIAPINE FUMARATE 25 MG/1
50 TABLET, FILM COATED ORAL NIGHTLY
Status: DISCONTINUED | OUTPATIENT
Start: 2024-10-18 | End: 2024-10-19 | Stop reason: HOSPADM

## 2024-10-18 RX ORDER — HYDRALAZINE HYDROCHLORIDE 20 MG/ML
10 INJECTION INTRAMUSCULAR; INTRAVENOUS EVERY 6 HOURS PRN
Status: DISCONTINUED | OUTPATIENT
Start: 2024-10-18 | End: 2024-10-19 | Stop reason: HOSPADM

## 2024-10-18 RX ORDER — FERROUS SULFATE 325(65) MG
325 TABLET ORAL
Status: DISCONTINUED | OUTPATIENT
Start: 2024-10-19 | End: 2024-10-19 | Stop reason: HOSPADM

## 2024-10-18 RX ORDER — ISOSORBIDE MONONITRATE 30 MG/1
60 TABLET, EXTENDED RELEASE ORAL 2 TIMES DAILY
Status: DISCONTINUED | OUTPATIENT
Start: 2024-10-18 | End: 2024-10-19 | Stop reason: HOSPADM

## 2024-10-18 RX ORDER — ACETAMINOPHEN 325 MG/1
650 TABLET ORAL EVERY 6 HOURS PRN
Status: DISCONTINUED | OUTPATIENT
Start: 2024-10-18 | End: 2024-10-19 | Stop reason: HOSPADM

## 2024-10-18 RX ORDER — ONDANSETRON 2 MG/ML
4 INJECTION INTRAMUSCULAR; INTRAVENOUS EVERY 6 HOURS PRN
Status: DISCONTINUED | OUTPATIENT
Start: 2024-10-18 | End: 2024-10-19 | Stop reason: HOSPADM

## 2024-10-18 RX ORDER — SODIUM CHLORIDE 9 MG/ML
INJECTION, SOLUTION INTRAVENOUS PRN
Status: DISCONTINUED | OUTPATIENT
Start: 2024-10-18 | End: 2024-10-19 | Stop reason: HOSPADM

## 2024-10-18 RX ORDER — INSULIN LISPRO 100 [IU]/ML
0-4 INJECTION, SOLUTION INTRAVENOUS; SUBCUTANEOUS
Status: DISCONTINUED | OUTPATIENT
Start: 2024-10-18 | End: 2024-10-19 | Stop reason: HOSPADM

## 2024-10-18 RX ORDER — POLYETHYLENE GLYCOL 3350 17 G/17G
17 POWDER, FOR SOLUTION ORAL DAILY PRN
Status: DISCONTINUED | OUTPATIENT
Start: 2024-10-18 | End: 2024-10-19 | Stop reason: HOSPADM

## 2024-10-18 RX ORDER — MONTELUKAST SODIUM 10 MG/1
10 TABLET ORAL
Status: DISCONTINUED | OUTPATIENT
Start: 2024-10-18 | End: 2024-10-19 | Stop reason: HOSPADM

## 2024-10-18 RX ORDER — LOSARTAN POTASSIUM 25 MG/1
50 TABLET ORAL
Status: DISCONTINUED | OUTPATIENT
Start: 2024-10-19 | End: 2024-10-19 | Stop reason: HOSPADM

## 2024-10-18 RX ORDER — ASPIRIN 81 MG/1
81 TABLET, CHEWABLE ORAL DAILY
Status: DISCONTINUED | OUTPATIENT
Start: 2024-10-19 | End: 2024-10-19 | Stop reason: HOSPADM

## 2024-10-18 RX ORDER — DULOXETIN HYDROCHLORIDE 60 MG/1
60 CAPSULE, DELAYED RELEASE ORAL
Status: DISCONTINUED | OUTPATIENT
Start: 2024-10-18 | End: 2024-10-19 | Stop reason: HOSPADM

## 2024-10-18 RX ORDER — CYCLOBENZAPRINE HCL 10 MG
5 TABLET ORAL 3 TIMES DAILY PRN
Status: DISCONTINUED | OUTPATIENT
Start: 2024-10-18 | End: 2024-10-19 | Stop reason: HOSPADM

## 2024-10-18 RX ORDER — HYDRALAZINE HYDROCHLORIDE 25 MG/1
25 TABLET, FILM COATED ORAL 3 TIMES DAILY
Status: DISCONTINUED | OUTPATIENT
Start: 2024-10-18 | End: 2024-10-19 | Stop reason: HOSPADM

## 2024-10-18 RX ORDER — BUTALBITAL, ACETAMINOPHEN AND CAFFEINE 300; 40; 50 MG/1; MG/1; MG/1
1 CAPSULE ORAL EVERY 4 HOURS PRN
Status: DISCONTINUED | OUTPATIENT
Start: 2024-10-18 | End: 2024-10-18

## 2024-10-18 RX ORDER — TRAMADOL HYDROCHLORIDE 50 MG/1
50 TABLET ORAL EVERY 6 HOURS PRN
Status: DISCONTINUED | OUTPATIENT
Start: 2024-10-18 | End: 2024-10-19 | Stop reason: HOSPADM

## 2024-10-18 RX ORDER — FLUTICASONE PROPIONATE 50 MCG
1 SPRAY, SUSPENSION (ML) NASAL DAILY PRN
Status: DISCONTINUED | OUTPATIENT
Start: 2024-10-18 | End: 2024-10-19 | Stop reason: HOSPADM

## 2024-10-18 RX ORDER — ENOXAPARIN SODIUM 100 MG/ML
40 INJECTION SUBCUTANEOUS DAILY
Status: DISCONTINUED | OUTPATIENT
Start: 2024-10-19 | End: 2024-10-19

## 2024-10-18 RX ORDER — INSULIN GLARGINE 100 [IU]/ML
8 INJECTION, SOLUTION SUBCUTANEOUS NIGHTLY
Status: DISCONTINUED | OUTPATIENT
Start: 2024-10-18 | End: 2024-10-19 | Stop reason: HOSPADM

## 2024-10-18 RX ORDER — BUDESONIDE AND FORMOTEROL FUMARATE DIHYDRATE 80; 4.5 UG/1; UG/1
2 AEROSOL RESPIRATORY (INHALATION)
Status: DISCONTINUED | OUTPATIENT
Start: 2024-10-18 | End: 2024-10-19 | Stop reason: HOSPADM

## 2024-10-18 RX ORDER — ALBUTEROL SULFATE 90 UG/1
2 INHALANT RESPIRATORY (INHALATION) EVERY 4 HOURS PRN
Status: DISCONTINUED | OUTPATIENT
Start: 2024-10-18 | End: 2024-10-19 | Stop reason: HOSPADM

## 2024-10-18 RX ORDER — DEXTROSE MONOHYDRATE 100 MG/ML
INJECTION, SOLUTION INTRAVENOUS CONTINUOUS PRN
Status: DISCONTINUED | OUTPATIENT
Start: 2024-10-18 | End: 2024-10-19 | Stop reason: HOSPADM

## 2024-10-18 RX ORDER — CETIRIZINE HYDROCHLORIDE 5 MG/1
5 TABLET ORAL DAILY
Status: DISCONTINUED | OUTPATIENT
Start: 2024-10-18 | End: 2024-10-18

## 2024-10-18 RX ORDER — RANOLAZINE 500 MG/1
1000 TABLET, EXTENDED RELEASE ORAL 2 TIMES DAILY
Status: DISCONTINUED | OUTPATIENT
Start: 2024-10-18 | End: 2024-10-19 | Stop reason: HOSPADM

## 2024-10-18 RX ORDER — SODIUM CHLORIDE 0.9 % (FLUSH) 0.9 %
5-40 SYRINGE (ML) INJECTION EVERY 12 HOURS SCHEDULED
Status: DISCONTINUED | OUTPATIENT
Start: 2024-10-18 | End: 2024-10-19 | Stop reason: HOSPADM

## 2024-10-18 RX ORDER — HYDROCODONE BITARTRATE AND ACETAMINOPHEN 5; 325 MG/1; MG/1
1 TABLET ORAL ONCE
Status: COMPLETED | OUTPATIENT
Start: 2024-10-18 | End: 2024-10-18

## 2024-10-18 RX ORDER — MIDODRINE HYDROCHLORIDE 5 MG/1
2.5 TABLET ORAL 2 TIMES DAILY WITH MEALS
Status: DISCONTINUED | OUTPATIENT
Start: 2024-10-18 | End: 2024-10-19

## 2024-10-18 RX ORDER — ALBUTEROL SULFATE 0.83 MG/ML
2.5 SOLUTION RESPIRATORY (INHALATION) EVERY 4 HOURS PRN
Status: DISCONTINUED | OUTPATIENT
Start: 2024-10-18 | End: 2024-10-19 | Stop reason: HOSPADM

## 2024-10-18 RX ORDER — ATORVASTATIN CALCIUM 40 MG/1
40 TABLET, FILM COATED ORAL NIGHTLY
Status: DISCONTINUED | OUTPATIENT
Start: 2024-10-18 | End: 2024-10-19 | Stop reason: HOSPADM

## 2024-10-18 RX ORDER — HYDROXYZINE HYDROCHLORIDE 25 MG/1
25 TABLET, FILM COATED ORAL 3 TIMES DAILY PRN
Status: DISCONTINUED | OUTPATIENT
Start: 2024-10-18 | End: 2024-10-19 | Stop reason: HOSPADM

## 2024-10-18 RX ORDER — AMLODIPINE BESYLATE 5 MG/1
5 TABLET ORAL DAILY
Status: DISCONTINUED | OUTPATIENT
Start: 2024-10-18 | End: 2024-10-19 | Stop reason: HOSPADM

## 2024-10-18 RX ORDER — SODIUM CHLORIDE 0.9 % (FLUSH) 0.9 %
5-40 SYRINGE (ML) INJECTION PRN
Status: DISCONTINUED | OUTPATIENT
Start: 2024-10-18 | End: 2024-10-19 | Stop reason: HOSPADM

## 2024-10-18 RX ORDER — ACETAMINOPHEN 650 MG/1
650 SUPPOSITORY RECTAL EVERY 6 HOURS PRN
Status: DISCONTINUED | OUTPATIENT
Start: 2024-10-18 | End: 2024-10-19 | Stop reason: HOSPADM

## 2024-10-18 RX ADMIN — DULOXETINE HYDROCHLORIDE 60 MG: 60 CAPSULE, DELAYED RELEASE ORAL at 21:07

## 2024-10-18 RX ADMIN — RANOLAZINE 1000 MG: 500 TABLET, FILM COATED, EXTENDED RELEASE ORAL at 21:08

## 2024-10-18 RX ADMIN — LEVETIRACETAM 500 MG: 500 TABLET, FILM COATED ORAL at 21:08

## 2024-10-18 RX ADMIN — ONDANSETRON 4 MG: 2 INJECTION INTRAMUSCULAR; INTRAVENOUS at 13:40

## 2024-10-18 RX ADMIN — CYCLOBENZAPRINE HYDROCHLORIDE 5 MG: 10 TABLET, FILM COATED ORAL at 21:07

## 2024-10-18 RX ADMIN — TICAGRELOR 90 MG: 90 TABLET ORAL at 21:08

## 2024-10-18 RX ADMIN — ATORVASTATIN CALCIUM 40 MG: 40 TABLET, FILM COATED ORAL at 21:09

## 2024-10-18 RX ADMIN — AMLODIPINE BESYLATE 5 MG: 5 TABLET ORAL at 22:23

## 2024-10-18 RX ADMIN — TRAMADOL HYDROCHLORIDE 50 MG: 50 TABLET, COATED ORAL at 21:08

## 2024-10-18 RX ADMIN — AMITRIPTYLINE HYDROCHLORIDE 40 MG: 10 TABLET, FILM COATED ORAL at 21:07

## 2024-10-18 RX ADMIN — HYDROXYZINE HYDROCHLORIDE 25 MG: 25 TABLET, FILM COATED ORAL at 21:09

## 2024-10-18 RX ADMIN — INSULIN GLARGINE 8 UNITS: 100 INJECTION, SOLUTION SUBCUTANEOUS at 21:13

## 2024-10-18 RX ADMIN — ISOSORBIDE MONONITRATE 60 MG: 30 TABLET, EXTENDED RELEASE ORAL at 21:08

## 2024-10-18 RX ADMIN — SODIUM CHLORIDE, PRESERVATIVE FREE 10 ML: 5 INJECTION INTRAVENOUS at 21:11

## 2024-10-18 RX ADMIN — QUETIAPINE FUMARATE 50 MG: 25 TABLET ORAL at 21:08

## 2024-10-18 RX ADMIN — HYDRALAZINE HYDROCHLORIDE 25 MG: 25 TABLET ORAL at 21:07

## 2024-10-18 RX ADMIN — MONTELUKAST 10 MG: 10 TABLET, FILM COATED ORAL at 21:08

## 2024-10-18 RX ADMIN — HYDROCODONE BITARTRATE AND ACETAMINOPHEN 1 TABLET: 5; 325 TABLET ORAL at 13:40

## 2024-10-18 ASSESSMENT — PAIN DESCRIPTION - ORIENTATION
ORIENTATION: LEFT
ORIENTATION: LEFT

## 2024-10-18 ASSESSMENT — PAIN DESCRIPTION - DESCRIPTORS
DESCRIPTORS: SQUEEZING
DESCRIPTORS: ACHING

## 2024-10-18 ASSESSMENT — PAIN - FUNCTIONAL ASSESSMENT
PAIN_FUNCTIONAL_ASSESSMENT: ACTIVITIES ARE NOT PREVENTED
PAIN_FUNCTIONAL_ASSESSMENT: 0-10

## 2024-10-18 ASSESSMENT — HEART SCORE: ECG: NORMAL

## 2024-10-18 ASSESSMENT — PAIN SCALES - GENERAL
PAINLEVEL_OUTOF10: 7
PAINLEVEL_OUTOF10: 8
PAINLEVEL_OUTOF10: 8
PAINLEVEL_OUTOF10: 5

## 2024-10-18 ASSESSMENT — PAIN DESCRIPTION - PAIN TYPE: TYPE: ACUTE PAIN

## 2024-10-18 ASSESSMENT — PAIN DESCRIPTION - LOCATION
LOCATION: CHEST

## 2024-10-18 NOTE — H&P
Hospital Medicine History & Physical      PCP: Marin Cardenas MD    Date of Admission: 10/18/2024    Date of Service: Pt seen/examined on 10/18/2024 and placed in Observation.    Chief Complaint: Chest pain      History Of Present Illness:      68 y.o. female who presented to [facility] with chest pain worse with ambulation, patient stated it is worse than her usual pain, up to 8 out of 10 intensity, nonradiating associated with some nausea with no vomiting, no nausea vomiting or abdominal pain denies fever or chills cardiology were consulted recommended to place in observation discussed with ED provider agrees plan as above and above    Past Medical History:          Diagnosis Date    Acid reflux     Anemia     Anxiety and depression     Arthritis     Asthma     Atrial fibrillation (HCC)     CAD (coronary artery disease) 12/3/2012    Cerebral artery occlusion with cerebral infarction (HCC)     TIA\"\"S--right sided weakness & headache    CHF (congestive heart failure) (HCC)     Chronic kidney disease--stage III     40% kidney function    COPD (chronic obstructive pulmonary disease) (HCC)     DM2 (diabetes mellitus, type 2) (Former[facility])     Dysarthria     Fibromyalgia 6/7/2016    Headache(784.0) 2/19/2013    Hemisensory loss     History of blood transfusion 11/2020    pt denies having transfusion reaction    Hyperlipidemia     Hypertension     IBS (irritable bowel syndrome)     Inferior vena cava occlusion (HCC)     Keratitis     Meningioma (HCC)     MI, old     Neuropathy     Superior vena cava obstruction     Temporal arteritis (HCC) 2/24/2014    Wears glasses        Past Surgical History:          Procedure Laterality Date    ABLATION OF DYSRHYTHMIC FOCUS  1999  and 11/2020    times 2    ARTERY BIOPSY Right 04/23/2014    RIGHT TEMPORAL ARTERY BIOPSY    CAROTID ARTERY SURGERY Left     clean up per pt    CATARACT REMOVAL Bilateral     CHOLECYSTECTOMY      COLONOSCOPY N/A 4/9/2021    COLONOSCOPY WITH BIOPSY performed

## 2024-10-18 NOTE — ED PROVIDER NOTES
[facility] EMERGENCY DEPARTMENT  EMERGENCY DEPARTMENT ENCOUNTER      Pt Name: Maren Meza  MRN: [id number]  Birthdate 1955  Date of evaluation: 10/18/2024  Provider: BILLY Randle  PCP: Marin Cardenas MD  Note Started: 6:23 PM EDT     The ED Attending Physician was available for consultation but did not see or evaluate this patient.    CHIEF COMPLAINT       Chief Complaint   Patient presents with    Chest Pain     Acute onset chest pain around 8-830 this morning + nausea, back pain and shoulder pain.        HISTORY OF PRESENT ILLNESS   (Location, Timing/Onset, Context/Setting, Quality, Duration, Modifying Factors, Severity, Associated Signs and Symptoms)  Note limiting factors.     Maren Meza is a 68 y.o. female who presents with complaint of left-sided chest pain.  Says it started this morning, she took some nitroglycerin and it helped somewhat, but the pain is not gone.  She has had chest pain before but this feels a little bit different.  Says it radiates to her left arm.  Denies any difficulty breathing.  No cough, cold symptoms or fever.  No relevant trauma.  No abdominal pain or nausea.    Nursing Notes were all reviewed and agreed with or any disagreements were addressed in the HPI.    REVIEW OF SYSTEMS    (2-9 systems for level 4, 10 or more for level 5)     Positives and pertinent negatives as per HPI.     PAST MEDICAL HISTORY     Past Medical History:   Diagnosis Date    Acid reflux     Anemia     Anxiety and depression     Arthritis     Asthma     Atrial fibrillation (HCC)     CAD (coronary artery disease) 12/3/2012    Cerebral artery occlusion with cerebral infarction (Former[facility])     TIA\"\"S--right sided weakness & headache    CHF (congestive heart failure) ([facility])     Chronic kidney disease--stage III     40% kidney function    COPD (chronic obstructive pulmonary disease) ([facility])     DM2 (diabetes mellitus, type 2) ([facility])     Dysarthria     Fibromyalgia 6/7/2016     VW)   Final Result   No acute process.             RECORDS REVIEWED   None.    CONSULTS   Cardiology Dr. Palomares, by phone, 17:39.  Hospitalist Dr. Carney, via [employer] and in the ED, 16:27, 18:05.    PROCEDURES   None.    EMERGENCY DEPARTMENT COURSE and DIFFERENTIAL DIAGNOSIS/MDM:   Vitals:    Vitals:    10/18/24 1135   BP: (!) 181/79   Pulse: 60   Resp: 14   Temp: 98.2 °F (36.8 °C)   SpO2: 98%   Weight: 49.9 kg (110 lb 0.2 oz)       Patient was given the following medications:  Medications   ondansetron (ZOFRAN) injection 4 mg (4 mg IntraVENous Given 10/18/24 1340)   HYDROcodone-acetaminophen (NORCO) 5-325 MG per tablet 1 tablet (1 tablet Oral Given 10/18/24 1340)           Is this patient to be included in the SEP-1 Core Measure due to severe sepsis or septic shock?   No     Exclusion criteria - the patient is NOT to be included for SEP-1 Core Measure due to:  Infection is not suspected    Initial EKG showed a junctional rhythm but no ST changes.  Repeat AG showed sinus rhythm.  Patient was somewhat hypertensive at 181/79, vitals otherwise normal.  Laboratory workup showed troponins above the normal range but roughly at the patient's baseline.  Patient has known CAD, last heart cath in March of this year showed no need for intervention, and she was evaluated by cardiology and a hospitalization 3 months ago, when she was undergoing hypertensive emergency, and there was no recommendation for further ischemic workup at that time.  Patient says the pain she is having now is different, however.  She does have known coronary artery disease.  I consulted the hospitalist, who recommended cardiology consultation.  I consulted cardiology, and the physician on call could not make a recommendation for disposition having not seen the patient, but did advise that observation would be a reasonable choice.  Given the patient's high risk, patient will be admitted for observation.  The patient verbalized understanding and

## 2024-10-18 NOTE — ED NOTES
Pt arrived to room 34 from [facility] at this time, pt is alert and orient vss alert and orient. Pt resting in bed at this time, laying in a supine position with head of bed elevated . Call light remains in reach instructed pt how to use, and encouraged pt to call if needed assistance, no distress noted. RR even and unlabored, skin warm and dry. Side rails up x's 2, bed in lowest position  No needs at this time. Will continue to monitor closely.

## 2024-10-19 VITALS
TEMPERATURE: 98.3 F | OXYGEN SATURATION: 99 % | DIASTOLIC BLOOD PRESSURE: 67 MMHG | BODY MASS INDEX: 21.05 KG/M2 | SYSTOLIC BLOOD PRESSURE: 139 MMHG | RESPIRATION RATE: 14 BRPM | HEART RATE: 64 BPM | WEIGHT: 107.81 LBS

## 2024-10-19 LAB
ANION GAP SERPL CALCULATED.3IONS-SCNC: 12 MMOL/L (ref 3–16)
BUN SERPL-MCNC: 42 MG/DL (ref 7–20)
CALCIUM SERPL-MCNC: 9.1 MG/DL (ref 8.3–10.6)
CHLORIDE SERPL-SCNC: 113 MMOL/L (ref 99–110)
CO2 SERPL-SCNC: 15 MMOL/L (ref 21–32)
CREAT SERPL-MCNC: 1.9 MG/DL (ref 0.6–1.2)
DEPRECATED RDW RBC AUTO: 15.9 % (ref 12.4–15.4)
EKG ATRIAL RATE: 48 BPM
EKG ATRIAL RATE: 53 BPM
EKG DIAGNOSIS: NORMAL
EKG DIAGNOSIS: NORMAL
EKG P AXIS: -27 DEGREES
EKG P-R INTERVAL: 100 MS
EKG P-R INTERVAL: 88 MS
EKG Q-T INTERVAL: 458 MS
EKG Q-T INTERVAL: 464 MS
EKG QRS DURATION: 78 MS
EKG QRS DURATION: 80 MS
EKG QTC CALCULATION (BAZETT): 414 MS
EKG QTC CALCULATION (BAZETT): 429 MS
EKG R AXIS: 46 DEGREES
EKG R AXIS: 48 DEGREES
EKG T AXIS: 102 DEGREES
EKG T AXIS: 80 DEGREES
EKG VENTRICULAR RATE: 48 BPM
EKG VENTRICULAR RATE: 53 BPM
EST. AVERAGE GLUCOSE BLD GHB EST-MCNC: 234.6 MG/DL
GFR SERPLBLD CREATININE-BSD FMLA CKD-EPI: 28 ML/MIN/{1.73_M2}
GLUCOSE BLD-MCNC: 109 MG/DL (ref 70–99)
GLUCOSE BLD-MCNC: 111 MG/DL (ref 70–99)
GLUCOSE SERPL-MCNC: 110 MG/DL (ref 70–99)
HBA1C MFR BLD: 9.8 %
HCT VFR BLD AUTO: 28.5 % (ref 36–48)
HGB BLD-MCNC: 9.5 G/DL (ref 12–16)
MCH RBC QN AUTO: 31.9 PG (ref 26–34)
MCHC RBC AUTO-ENTMCNC: 33.3 G/DL (ref 31–36)
MCV RBC AUTO: 95.7 FL (ref 80–100)
PERFORMED ON: ABNORMAL
PERFORMED ON: ABNORMAL
PLATELET # BLD AUTO: 174 K/UL (ref 135–450)
PMV BLD AUTO: 8.2 FL (ref 5–10.5)
POTASSIUM SERPL-SCNC: 4 MMOL/L (ref 3.5–5.1)
RBC # BLD AUTO: 2.98 M/UL (ref 4–5.2)
SODIUM SERPL-SCNC: 140 MMOL/L (ref 136–145)
TROPONIN, HIGH SENSITIVITY: 42 NG/L (ref 0–14)
WBC # BLD AUTO: 5.3 K/UL (ref 4–11)

## 2024-10-19 PROCEDURE — 94760 N-INVAS EAR/PLS OXIMETRY 1: CPT

## 2024-10-19 PROCEDURE — 83036 HEMOGLOBIN GLYCOSYLATED A1C: CPT

## 2024-10-19 PROCEDURE — G0378 HOSPITAL OBSERVATION PER HR: HCPCS

## 2024-10-19 PROCEDURE — 6370000000 HC RX 637 (ALT 250 FOR IP): Performed by: INTERNAL MEDICINE

## 2024-10-19 PROCEDURE — 80048 BASIC METABOLIC PNL TOTAL CA: CPT

## 2024-10-19 PROCEDURE — 2580000003 HC RX 258: Performed by: INTERNAL MEDICINE

## 2024-10-19 PROCEDURE — 94640 AIRWAY INHALATION TREATMENT: CPT

## 2024-10-19 PROCEDURE — 99223 1ST HOSP IP/OBS HIGH 75: CPT | Performed by: INTERNAL MEDICINE

## 2024-10-19 PROCEDURE — 84484 ASSAY OF TROPONIN QUANT: CPT

## 2024-10-19 PROCEDURE — 93010 ELECTROCARDIOGRAM REPORT: CPT | Performed by: INTERNAL MEDICINE

## 2024-10-19 PROCEDURE — 6360000002 HC RX W HCPCS: Performed by: INTERNAL MEDICINE

## 2024-10-19 PROCEDURE — 85027 COMPLETE CBC AUTOMATED: CPT

## 2024-10-19 PROCEDURE — 96372 THER/PROPH/DIAG INJ SC/IM: CPT

## 2024-10-19 PROCEDURE — 36415 COLL VENOUS BLD VENIPUNCTURE: CPT

## 2024-10-19 RX ORDER — CLONIDINE HYDROCHLORIDE 0.1 MG/1
0.1 TABLET ORAL 2 TIMES DAILY PRN
COMMUNITY

## 2024-10-19 RX ORDER — HEPARIN SODIUM 5000 [USP'U]/ML
5000 INJECTION, SOLUTION INTRAVENOUS; SUBCUTANEOUS 2 TIMES DAILY
Status: DISCONTINUED | OUTPATIENT
Start: 2024-10-19 | End: 2024-10-19 | Stop reason: HOSPADM

## 2024-10-19 RX ADMIN — ASPIRIN 81 MG: 81 TABLET, CHEWABLE ORAL at 08:22

## 2024-10-19 RX ADMIN — METOPROLOL SUCCINATE 50 MG: 50 TABLET, EXTENDED RELEASE ORAL at 12:32

## 2024-10-19 RX ADMIN — SODIUM CHLORIDE, PRESERVATIVE FREE 10 ML: 5 INJECTION INTRAVENOUS at 08:23

## 2024-10-19 RX ADMIN — RANOLAZINE 1000 MG: 500 TABLET, FILM COATED, EXTENDED RELEASE ORAL at 08:22

## 2024-10-19 RX ADMIN — PREDNISONE 10 MG: 5 TABLET ORAL at 08:22

## 2024-10-19 RX ADMIN — HEPARIN SODIUM 5000 UNITS: 5000 INJECTION INTRAVENOUS; SUBCUTANEOUS at 08:46

## 2024-10-19 RX ADMIN — HYDRALAZINE HYDROCHLORIDE 25 MG: 25 TABLET ORAL at 08:22

## 2024-10-19 RX ADMIN — FERROUS SULFATE TAB 325 MG (65 MG ELEMENTAL FE) 325 MG: 325 (65 FE) TAB at 08:22

## 2024-10-19 RX ADMIN — ISOSORBIDE MONONITRATE 60 MG: 30 TABLET, EXTENDED RELEASE ORAL at 08:22

## 2024-10-19 RX ADMIN — LOSARTAN POTASSIUM 50 MG: 25 TABLET, FILM COATED ORAL at 06:04

## 2024-10-19 RX ADMIN — LEVETIRACETAM 500 MG: 500 TABLET, FILM COATED ORAL at 08:22

## 2024-10-19 RX ADMIN — Medication 2 PUFF: at 11:17

## 2024-10-19 RX ADMIN — PANTOPRAZOLE SODIUM 40 MG: 40 TABLET, DELAYED RELEASE ORAL at 06:04

## 2024-10-19 RX ADMIN — TICAGRELOR 90 MG: 90 TABLET ORAL at 08:22

## 2024-10-19 RX ADMIN — TRAMADOL HYDROCHLORIDE 50 MG: 50 TABLET, COATED ORAL at 08:47

## 2024-10-19 ASSESSMENT — PAIN DESCRIPTION - PAIN TYPE: TYPE: ACUTE PAIN

## 2024-10-19 ASSESSMENT — PAIN SCALES - GENERAL
PAINLEVEL_OUTOF10: 0
PAINLEVEL_OUTOF10: 5
PAINLEVEL_OUTOF10: 6
PAINLEVEL_OUTOF10: 6
PAINLEVEL_OUTOF10: 5

## 2024-10-19 ASSESSMENT — PAIN DESCRIPTION - DESCRIPTORS: DESCRIPTORS: ACHING;DISCOMFORT

## 2024-10-19 ASSESSMENT — PAIN - FUNCTIONAL ASSESSMENT: PAIN_FUNCTIONAL_ASSESSMENT: PREVENTS OR INTERFERES SOME ACTIVE ACTIVITIES AND ADLS

## 2024-10-19 ASSESSMENT — PAIN DESCRIPTION - ONSET: ONSET: ON-GOING

## 2024-10-19 ASSESSMENT — PAIN DESCRIPTION - ORIENTATION: ORIENTATION: LEFT

## 2024-10-19 ASSESSMENT — PAIN DESCRIPTION - LOCATION: LOCATION: CHEST

## 2024-10-19 ASSESSMENT — PAIN DESCRIPTION - FREQUENCY: FREQUENCY: CONTINUOUS

## 2024-10-19 NOTE — CONSULTS
[facility]    Maren Meza  1955    October 19, 2024    Reason for Consult: Chest Pain    CC: Chest Pain    HPI:  The patient is 68 y.o. female with a past medical history significant for 68 y.o. female with a past medical history significant for CAD with prior PCI. She follows with Dr. Allred and has had chronic episodes of chest pain with multiple admissions.     Maren states that she was laying down yesterday morning and she started having left-sided chest pain. This pain went up to her shoulder and around to her arm. She says her BP was 237/100 \"something\". She says she took 2 or 3 nitroglycerin tablets which improved the pain but did not take it completely away. She got up and called EMS.     She says that she has some left-sided chest pain now.     Review of Systems:  Constitutional: No fatigue, weakness, night sweats or fever.   HEENT: No new vision difficulties or ringing in the ears.  Respiratory: No new SOB, PND, orthopnea or cough.   Cardiovascular: See HPI   GI: No n/v, diarrhea, constipation, abdominal pain or changes in bowel habits.  No melena, no hematochezia  : No urinary frequency, urgency, incontinence, hematuria or dysuria.  Skin: No cyanosis or skin lesions.  Musculoskeletal: No new muscle or joint pain.  Neurological: No syncope or TIA-like symptoms.  Psychiatric: No anxiety, insomnia or depression     Past Medical History:   Diagnosis Date    Acid reflux     Anemia     Anxiety and depression     Arthritis     Asthma     Atrial fibrillation (HCC)     CAD (coronary artery disease) 12/3/2012    Cerebral artery occlusion with cerebral infarction ([facility])     TIA\"\"S--right sided weakness & headache    CHF (congestive heart failure) ([facility])     Chronic kidney disease--stage III     40% kidney function    COPD (chronic obstructive pulmonary disease) ([facility])     DM2 (diabetes mellitus, type 2) ([facility])     Dysarthria     Fibromyalgia 6/7/2016    Headache(784.0) 2/19/2013     gradient between the left ventricle and aorta.       ANGIOGRAM/CORONARY ARTERIOGRAM:        The left main coronary artery is very short without significant disease.     The left anterior descending artery has patent previously placed stent with mild mid vessel 20% ISR.     The left circumflex artery is non-dominant with small AV groove course with minor luminal irregularities.              OM1 has moderate ISR 40% mid vessel              OM2 has mild ISR 30% proximal and mid vessel     The right coronary artery is dominant vessel with widely patent stents.              RPDA has mild diffuse disease     Selective angiography of the L common femoral artery was performed without evidence of extravasation     Assessment / Plan:     Chest Pain, atypical   Maren has chest pain on a very frequent basis.  This presentation is very similar to the one she had in April for which I saw her in consultation. Her chest pain is always nonanginal in natyure and her troponin assays are always mildly elevated. I would continue her on her current medications inclduing Ranexa and IMdur.   I would not pursue any kind of ischemic work-up for her.    This pain is not angina. Reasons to admit Maren in the future would be ischemic ECG changes on presentation or markedly elevated troponin assays that are above what she has on a chronic basis. Her troponin assays are never normal.     2.  Hyperlipidemia with goal LDL less than 70 mg/dL  Continue atorvastatin 40 mg daily.    3.  Essential hypertension  I do not know why she was prescribed midodrine and that was on her list when she has been hypertensive.  I would stop this.  Okay to continue hydralazine 25 mg 3 times daily along with isosorbide mononitrate 60 mg twice daily losartan and Toprol-XL.    Okay to discharge to home.  I will sign off for now.  Please feel free to call with any concerns or questions.      Thank you very much for allowing me to participate in the care of your patient.

## 2024-10-19 NOTE — PLAN OF CARE
Problem: Chronic Conditions and Co-morbidities  Goal: Patient's chronic conditions and co-morbidity symptoms are monitored and maintained or improved  10/19/2024 0954 by Teri Cantu RN  Outcome: Progressing  10/19/2024 0324 by Chioma Montague RN  Outcome: Progressing     Problem: Discharge Planning  Goal: Discharge to home or other facility with appropriate resources  10/19/2024 0954 by Teri Cantu RN  Outcome: Progressing  Flowsheets  Taken 10/19/2024 0954  Discharge to home or other facility with appropriate resources:   Identify barriers to discharge with patient and caregiver   Arrange for needed discharge resources and transportation as appropriate   Identify discharge learning needs (meds, wound care, etc)  Taken 10/19/2024 0825  Discharge to home or other facility with appropriate resources: Identify barriers to discharge with patient and caregiver  10/19/2024 0324 by Chioma Montague RN  Outcome: Progressing     Problem: Pain  Goal: Verbalizes/displays adequate comfort level or baseline comfort level  10/19/2024 0954 by Teri Cantu RN  Outcome: Progressing  Flowsheets (Taken 10/19/2024 0954)  Verbalizes/displays adequate comfort level or baseline comfort level:   Encourage patient to monitor pain and request assistance   Assess pain using appropriate pain scale   Administer analgesics based on type and severity of pain and evaluate response   Implement non-pharmacological measures as appropriate and evaluate response  Note: Pt able to express presence and absence of pain using numerical pain scale. Pt pain is managed by PRN analgesics as ordered by MD. Pain reassess after each interventions. Will continue to monitor as needed.     10/19/2024 0324 by Chioma Montague RN  Outcome: Progressing     Problem: Safety - Adult  Goal: Free from fall injury  10/19/2024 0954 by Teri Cantu RN  Outcome: Progressing  Flowsheets (Taken 10/19/2024 0954)  Free From Fall Injury: Instruct family/caregiver on  patient safety  Note: Pt free from falls this shift. Non skid socks provided. Pt educated on use of call light as needed for assistance. Bed and chair alarm on. Call light always within reach. Pt able and agreeable to contact for safety appropriately.    10/19/2024 0324 by Choima Montague, RN  Outcome: Progressing     Problem: Cardiovascular - Adult  Goal: Absence of cardiac dysrhythmias or at baseline  10/19/2024 0954 by Teri Cantu RN  Outcome: Progressing  10/19/2024 0324 by Chioma Montague, RN  Outcome: Progressing     Problem: ABCDS Injury Assessment  Goal: Absence of physical injury  Outcome: Progressing

## 2024-10-19 NOTE — PROGRESS NOTES
All verbal and written discharge instructions given to the pt at discharge regarding changes in home meds and follow up appointment-pt verbalized understandings and denies other needs at discharge. Electronically signed by Teri Cantu RN on 10/19/2024 at 4:22 PM

## 2024-10-19 NOTE — PROGRESS NOTES
Clinical Pharmacy Note  Subcutaneous Anticoagulant Adjustment     Enoxaparin has been adjusted to Heparin 5,000 units SQ BID based on [facility] policy.    Recent Labs     10/18/24  1314 10/19/24  0556   CREATININE 1.8* 1.9*     Recent Labs     10/19/24  0556   HGB 9.5*   HCT 28.5*        Estimated Creatinine Clearance: 21 mL/min (A) (based on SCr of 1.9 mg/dL (H)).    Pharmacist Review of Appropriate Use and Automatic Dose Adjustment of Subcutaneous Anticoagulants (Adult)    The guidance below is to provide initial recommendations for dosing. If recommended dose does not align well with patient's current clinical picture, communications with the care team will occur to determine most appropriate medication and dose.    TABLE 1.  ENOXAPARIN ROUTINE PROPHYLAXIS DOSING (Medically ill, routine surgery)   Patient Weight (kg)     50.9 and below 51 - 100.9 101 - 150.9 151 - 174.9 175 or greater         Estimated CrCl  (ml/min) 30 or greater   30 mg SUBQ daily   40 mg SUBQ daily 30 mg SUBQ BID  40 mg SUBQ BID 60mg SUBQ BID      15-29 UFH 5000 units SUBQ BID   30 mg SUBQ daily 30 mg SUBQ daily 40 mg SUBQ daily   60 mg SUBQ daily      Less than 15 or Dialysis UFH 5000 units SUBQ BID   UFH 5000 units SUBQ TID UFH 7500 units SUBQ TID       TABLE 2.  ENOXAPARIN TREATMENT DOSING   (Based on 1mg/kg BID for DVT/PE/AFib)   Patient Weight (kg)     50.9 and below .9 151-189.9 190 or greater         Estimated CrCl  (ml/min) 30 or greater Recommend BS standardized UFH infusion, apixaban or rivaroxaban 1mg/kg SUBQ BID 1mg/kg SUBQ BID if anti-Xa levels are feasible per institution.  Alternatively,  recommend switch to BS standardized UFH infusion     Recommend switch to BS standardized UFH infusion.      15-29 Recommend [facility] standardized UFH infusion or apixaban 1mg/kg SUBQ daily Recommend switch to [facility] standardized UFH infusion     Less than 15 or Dialysis Recommend switch to [facility] standardized UFH infusion.     Neno PARRISH  [place] [facility] 10/19/2024 8:21 AM

## 2024-10-19 NOTE — PROGRESS NOTES
Pt admitted to room 4259 from ER. Pt alert and oriented x4. Oriented to room and call light. Plan of care and order reviewed with pt. All questions answered.

## 2024-10-19 NOTE — PROGRESS NOTES
4 Eyes Skin Assessment     NAME:  Maren Meza  YOB: 1955  MEDICAL RECORD NUMBER:  [id number]    The patient is being assessed for  Admission    I agree that at least one RN has performed a thorough Head to Toe Skin Assessment on the patient. ALL assessment sites listed below have been assessed.      Areas assessed by both nurses:    Head, Face, Ears, Shoulders, Back, Chest, Arms, Elbows, Hands, Sacrum. Buttock, Coccyx, Ischium, and Legs. Feet and Heels        Does the Patient have a Wound? No noted wound(s)       Rakan Prevention initiated by RN: No  Wound Care Orders initiated by RN: No    Pressure Injury (Stage 3,4, Unstageable, DTI, NWPT, and Complex wounds) if present, place Wound referral order by RN under : No    New Ostomies, if present place, Ostomy referral order under : No     Nurse 1 eSignature: Electronically signed by Chioma Montague RN on 10/18/24 at 10:42 PM EDT    **SHARE this note so that the co-signing nurse can place an eSignature**    Nurse 2 eSignature: Electronically signed by Celina Ureña RN on 10/18/24 at 10:43 PM EDT

## 2024-10-19 NOTE — PROGRESS NOTES
Pt reports ongoing left sided chest pain, pt rates pain 6/10 at this time-pt offered prn nitro as ordered-pt refused nitroglycerin for chest pain. Pt states that nitro gives her worse headache. Pt educated in nitro. Pt medicated with prn tramadol as ordered at this time-pt request something stronger than tramadol. This RN will notify MD. Pt denies any nausea or SOB. Pt resting in bed. Denies other needs. Call light and item need in reach. Electronically signed by Teri Cantu RN on 10/19/2024 at 9:59 AM

## 2024-10-19 NOTE — PLAN OF CARE
Problem: Chronic Conditions and Co-morbidities  Goal: Patient's chronic conditions and co-morbidity symptoms are monitored and maintained or improved  Outcome: Progressing     Problem: Discharge Planning  Goal: Discharge to home or other facility with appropriate resources  Outcome: Progressing     Problem: Pain  Goal: Verbalizes/displays adequate comfort level or baseline comfort level  Outcome: Progressing     Problem: Safety - Adult  Goal: Free from fall injury  Outcome: Progressing     Problem: Cardiovascular - Adult  Goal: Absence of cardiac dysrhythmias or at baseline  Outcome: Progressing

## 2024-11-22 ENCOUNTER — APPOINTMENT (OUTPATIENT)
Dept: GENERAL RADIOLOGY | Age: 69
End: 2024-11-22
Payer: COMMERCIAL

## 2024-11-22 ENCOUNTER — HOSPITAL ENCOUNTER (EMERGENCY)
Age: 69
Discharge: HOME | End: 2024-11-22
Attending: EMERGENCY MEDICINE
Payer: COMMERCIAL

## 2024-11-22 VITALS
HEART RATE: 50 BPM | TEMPERATURE: 98.3 F | RESPIRATION RATE: 14 BRPM | BODY MASS INDEX: 21.2 KG/M2 | OXYGEN SATURATION: 99 % | DIASTOLIC BLOOD PRESSURE: 62 MMHG | SYSTOLIC BLOOD PRESSURE: 165 MMHG | WEIGHT: 108 LBS | HEIGHT: 60 IN

## 2024-11-22 DIAGNOSIS — R07.9 CHEST PAIN, UNSPECIFIED TYPE: Primary | ICD-10-CM

## 2024-11-22 LAB
ANION GAP SERPL CALCULATED.3IONS-SCNC: 19 MMOL/L (ref 3–16)
BASOPHILS # BLD: 0 K/UL (ref 0–0.2)
BASOPHILS NFR BLD: 1.1 %
BUN SERPL-MCNC: 32 MG/DL (ref 7–20)
CALCIUM SERPL-MCNC: 9.2 MG/DL (ref 8.3–10.6)
CHLORIDE SERPL-SCNC: 104 MMOL/L (ref 99–110)
CO2 SERPL-SCNC: 19 MMOL/L (ref 21–32)
CREAT SERPL-MCNC: 2 MG/DL (ref 0.6–1.2)
DEPRECATED RDW RBC AUTO: 14.8 % (ref 12.4–15.4)
EOSINOPHIL # BLD: 0.1 K/UL (ref 0–0.6)
EOSINOPHIL NFR BLD: 2.1 %
GFR SERPLBLD CREATININE-BSD FMLA CKD-EPI: 27 ML/MIN/{1.73_M2}
GLUCOSE SERPL-MCNC: 173 MG/DL (ref 70–99)
HCT VFR BLD AUTO: 24.9 % (ref 36–48)
HGB BLD-MCNC: 8.1 G/DL (ref 12–16)
LYMPHOCYTES # BLD: 1.1 K/UL (ref 1–5.1)
LYMPHOCYTES NFR BLD: 24.3 %
MAGNESIUM SERPL-MCNC: 2.2 MG/DL (ref 1.8–2.4)
MCH RBC QN AUTO: 32 PG (ref 26–34)
MCHC RBC AUTO-ENTMCNC: 32.5 G/DL (ref 31–36)
MCV RBC AUTO: 98.4 FL (ref 80–100)
MONOCYTES # BLD: 0.2 K/UL (ref 0–1.3)
MONOCYTES NFR BLD: 5.4 %
NEUTROPHILS # BLD: 3.1 K/UL (ref 1.7–7.7)
NEUTROPHILS NFR BLD: 67.1 %
PLATELET # BLD AUTO: 125 K/UL (ref 135–450)
PMV BLD AUTO: 9 FL (ref 5–10.5)
POTASSIUM SERPL-SCNC: 3.9 MMOL/L (ref 3.5–5.1)
RBC # BLD AUTO: 2.53 M/UL (ref 4–5.2)
SODIUM SERPL-SCNC: 142 MMOL/L (ref 136–145)
TROPONIN, HIGH SENSITIVITY: 47 NG/L (ref 0–14)
TROPONIN, HIGH SENSITIVITY: 52 NG/L (ref 0–14)
WBC # BLD AUTO: 4.6 K/UL (ref 4–11)

## 2024-11-22 PROCEDURE — 99285 EMERGENCY DEPT VISIT HI MDM: CPT

## 2024-11-22 PROCEDURE — 93005 ELECTROCARDIOGRAM TRACING: CPT | Performed by: EMERGENCY MEDICINE

## 2024-11-22 PROCEDURE — 6360000002 HC RX W HCPCS: Performed by: EMERGENCY MEDICINE

## 2024-11-22 PROCEDURE — 71045 X-RAY EXAM CHEST 1 VIEW: CPT

## 2024-11-22 PROCEDURE — 80048 BASIC METABOLIC PNL TOTAL CA: CPT

## 2024-11-22 PROCEDURE — 96375 TX/PRO/DX INJ NEW DRUG ADDON: CPT

## 2024-11-22 PROCEDURE — 36415 COLL VENOUS BLD VENIPUNCTURE: CPT

## 2024-11-22 PROCEDURE — 96374 THER/PROPH/DIAG INJ IV PUSH: CPT

## 2024-11-22 PROCEDURE — 83735 ASSAY OF MAGNESIUM: CPT

## 2024-11-22 PROCEDURE — 85025 COMPLETE CBC W/AUTO DIFF WBC: CPT

## 2024-11-22 PROCEDURE — 84484 ASSAY OF TROPONIN QUANT: CPT

## 2024-11-22 RX ORDER — NITROGLYCERIN 0.4 MG/1
0.4 TABLET SUBLINGUAL EVERY 5 MIN PRN
Status: DISCONTINUED | OUTPATIENT
Start: 2024-11-22 | End: 2024-11-23 | Stop reason: HOSPADM

## 2024-11-22 RX ORDER — ONDANSETRON 2 MG/ML
4 INJECTION INTRAMUSCULAR; INTRAVENOUS ONCE
Status: COMPLETED | OUTPATIENT
Start: 2024-11-22 | End: 2024-11-22

## 2024-11-22 RX ORDER — KETOROLAC TROMETHAMINE 15 MG/ML
15 INJECTION, SOLUTION INTRAMUSCULAR; INTRAVENOUS ONCE
Status: COMPLETED | OUTPATIENT
Start: 2024-11-22 | End: 2024-11-22

## 2024-11-22 RX ADMIN — ONDANSETRON 4 MG: 2 INJECTION INTRAMUSCULAR; INTRAVENOUS at 22:56

## 2024-11-22 RX ADMIN — KETOROLAC TROMETHAMINE 15 MG: 15 INJECTION, SOLUTION INTRAMUSCULAR; INTRAVENOUS at 22:17

## 2024-11-22 ASSESSMENT — PAIN SCALES - GENERAL
PAINLEVEL_OUTOF10: 10
PAINLEVEL_OUTOF10: 6
PAINLEVEL_OUTOF10: 10

## 2024-11-22 ASSESSMENT — PAIN DESCRIPTION - ORIENTATION: ORIENTATION: LEFT

## 2024-11-22 ASSESSMENT — PAIN DESCRIPTION - LOCATION: LOCATION: CHEST

## 2024-11-22 ASSESSMENT — PAIN DESCRIPTION - PAIN TYPE: TYPE: ACUTE PAIN

## 2024-11-22 ASSESSMENT — PAIN DESCRIPTION - ONSET: ONSET: SUDDEN

## 2024-11-22 ASSESSMENT — PAIN DESCRIPTION - DESCRIPTORS: DESCRIPTORS: PRESSURE

## 2024-11-22 ASSESSMENT — PAIN DESCRIPTION - FREQUENCY: FREQUENCY: CONTINUOUS

## 2024-11-23 LAB
EKG ATRIAL RATE: 66 BPM
EKG DIAGNOSIS: NORMAL
EKG P AXIS: -11 DEGREES
EKG P-R INTERVAL: 120 MS
EKG Q-T INTERVAL: 440 MS
EKG QRS DURATION: 92 MS
EKG QTC CALCULATION (BAZETT): 461 MS
EKG R AXIS: 14 DEGREES
EKG T AXIS: 146 DEGREES
EKG VENTRICULAR RATE: 66 BPM

## 2024-11-23 PROCEDURE — 93010 ELECTROCARDIOGRAM REPORT: CPT | Performed by: INTERNAL MEDICINE

## 2024-11-23 ASSESSMENT — ENCOUNTER SYMPTOMS
NAUSEA: 1
DIARRHEA: 0
VOMITING: 0
EYE REDNESS: 0
COUGH: 0
RHINORRHEA: 0
EYE PAIN: 0
ABDOMINAL PAIN: 0
CONSTIPATION: 0
BACK PAIN: 0
SHORTNESS OF BREATH: 0

## 2024-11-23 NOTE — ED TRIAGE NOTES
Patient to the ER With complaints of left sided chest pressure with nausea that started about 3.5 hours ago.  Patient took 2 nitro tabs at home and says they initially relieved the pain, but it came back so she came in to the ER for evaluation.     Hx of 2 heart attacks, watchman device,CHF, COPD, and A-fib.     Alert and oriented x4 and ambulatory with a steady gait at time of triage.

## 2024-11-23 NOTE — ED PROVIDER NOTES
to be greater than risk of treatment at home. We discussed and I explained the risk could rapidly change and return precautions and instructions given. Discharge disposition is reasonable.     I am the Primary Clinician of Record.  FINAL IMPRESSION      1. Chest pain, unspecified type          DISPOSITION/PLAN   DISPOSITION Decision To Discharge 11/22/2024 11:13:27 PM           PATIENT REFERRED TO:  Marin Cardenas MD  [address] [facility] [id number] [phone]    Schedule an appointment as soon as possible for a visit       [address] [id number] [phone]  Go to   As needed, If symptoms worsen      DISCHARGE MEDICATIONS:  Discharge Medication List as of 11/22/2024 11:04 PM             (Please note that portions of this note were completed with a voice recognition program.  Efforts were made to edit the dictations but occasionally words are mis-transcribed.)    Anuj Cooper DO (electronically signed)  Attending Emergency Physician        Anuj Cooper DO  11/23/24 0219

## 2024-11-29 DIAGNOSIS — I10 ESSENTIAL HYPERTENSION: ICD-10-CM

## 2024-12-01 ENCOUNTER — APPOINTMENT (OUTPATIENT)
Dept: GENERAL RADIOLOGY | Age: 69
End: 2024-12-01
Payer: COMMERCIAL

## 2024-12-01 ENCOUNTER — HOSPITAL ENCOUNTER (EMERGENCY)
Age: 69
Discharge: HOME | End: 2024-12-01
Payer: COMMERCIAL

## 2024-12-01 VITALS
DIASTOLIC BLOOD PRESSURE: 78 MMHG | TEMPERATURE: 98 F | HEART RATE: 62 BPM | SYSTOLIC BLOOD PRESSURE: 174 MMHG | RESPIRATION RATE: 18 BRPM | OXYGEN SATURATION: 100 %

## 2024-12-01 DIAGNOSIS — R07.89 ATYPICAL CHEST PAIN: Primary | ICD-10-CM

## 2024-12-01 DIAGNOSIS — D64.9 CHRONIC ANEMIA: ICD-10-CM

## 2024-12-01 DIAGNOSIS — I10 ELEVATED BLOOD PRESSURE READING IN OFFICE WITH DIAGNOSIS OF HYPERTENSION: ICD-10-CM

## 2024-12-01 LAB
ALBUMIN SERPL-MCNC: 3.3 G/DL (ref 3.4–5)
ALBUMIN/GLOB SERPL: 1 {RATIO} (ref 1.1–2.2)
ALP SERPL-CCNC: 118 U/L (ref 40–129)
ALT SERPL-CCNC: 38 U/L (ref 10–40)
ANION GAP SERPL CALCULATED.3IONS-SCNC: 13 MMOL/L (ref 3–16)
AST SERPL-CCNC: 50 U/L (ref 15–37)
BASOPHILS # BLD: 0 K/UL (ref 0–0.2)
BASOPHILS NFR BLD: 0.8 %
BILIRUB SERPL-MCNC: <0.2 MG/DL (ref 0–1)
BUN SERPL-MCNC: 34 MG/DL (ref 7–20)
CALCIUM SERPL-MCNC: 9 MG/DL (ref 8.3–10.6)
CHLORIDE SERPL-SCNC: 106 MMOL/L (ref 99–110)
CO2 SERPL-SCNC: 20 MMOL/L (ref 21–32)
CREAT SERPL-MCNC: 2.2 MG/DL (ref 0.6–1.2)
DEPRECATED RDW RBC AUTO: 14.3 % (ref 12.4–15.4)
EOSINOPHIL # BLD: 0.2 K/UL (ref 0–0.6)
EOSINOPHIL NFR BLD: 4.3 %
GFR SERPLBLD CREATININE-BSD FMLA CKD-EPI: 24 ML/MIN/{1.73_M2}
GLUCOSE SERPL-MCNC: 233 MG/DL (ref 70–99)
HCT VFR BLD AUTO: 27.3 % (ref 36–48)
HGB BLD-MCNC: 9 G/DL (ref 12–16)
LIPASE SERPL-CCNC: 40 U/L (ref 13–60)
LYMPHOCYTES # BLD: 1.3 K/UL (ref 1–5.1)
LYMPHOCYTES NFR BLD: 27.3 %
MCH RBC QN AUTO: 31.9 PG (ref 26–34)
MCHC RBC AUTO-ENTMCNC: 32.9 G/DL (ref 31–36)
MCV RBC AUTO: 96.8 FL (ref 80–100)
MONOCYTES # BLD: 0.3 K/UL (ref 0–1.3)
MONOCYTES NFR BLD: 6.1 %
NEUTROPHILS # BLD: 2.9 K/UL (ref 1.7–7.7)
NEUTROPHILS NFR BLD: 61.5 %
NT-PROBNP SERPL-MCNC: 2048 PG/ML (ref 0–124)
PLATELET # BLD AUTO: 174 K/UL (ref 135–450)
PMV BLD AUTO: 8.2 FL (ref 5–10.5)
POTASSIUM SERPL-SCNC: 4.2 MMOL/L (ref 3.5–5.1)
PROT SERPL-MCNC: 6.7 G/DL (ref 6.4–8.2)
RBC # BLD AUTO: 2.82 M/UL (ref 4–5.2)
SODIUM SERPL-SCNC: 139 MMOL/L (ref 136–145)
TROPONIN, HIGH SENSITIVITY: 37 NG/L (ref 0–14)
TROPONIN, HIGH SENSITIVITY: 38 NG/L (ref 0–14)
WBC # BLD AUTO: 4.7 K/UL (ref 4–11)

## 2024-12-01 PROCEDURE — 99285 EMERGENCY DEPT VISIT HI MDM: CPT

## 2024-12-01 PROCEDURE — 96374 THER/PROPH/DIAG INJ IV PUSH: CPT

## 2024-12-01 PROCEDURE — 6370000000 HC RX 637 (ALT 250 FOR IP): Performed by: PHYSICIAN ASSISTANT

## 2024-12-01 PROCEDURE — 85025 COMPLETE CBC W/AUTO DIFF WBC: CPT

## 2024-12-01 PROCEDURE — 93005 ELECTROCARDIOGRAM TRACING: CPT | Performed by: EMERGENCY MEDICINE

## 2024-12-01 PROCEDURE — 71045 X-RAY EXAM CHEST 1 VIEW: CPT

## 2024-12-01 PROCEDURE — 84484 ASSAY OF TROPONIN QUANT: CPT

## 2024-12-01 PROCEDURE — 36415 COLL VENOUS BLD VENIPUNCTURE: CPT

## 2024-12-01 PROCEDURE — 83880 ASSAY OF NATRIURETIC PEPTIDE: CPT

## 2024-12-01 PROCEDURE — 80053 COMPREHEN METABOLIC PANEL: CPT

## 2024-12-01 PROCEDURE — 83690 ASSAY OF LIPASE: CPT

## 2024-12-01 PROCEDURE — 6360000002 HC RX W HCPCS: Performed by: PHYSICIAN ASSISTANT

## 2024-12-01 RX ORDER — ACETAMINOPHEN 325 MG/1
650 TABLET ORAL ONCE
Status: COMPLETED | OUTPATIENT
Start: 2024-12-01 | End: 2024-12-01

## 2024-12-01 RX ORDER — ONDANSETRON 2 MG/ML
4 INJECTION INTRAMUSCULAR; INTRAVENOUS ONCE
Status: COMPLETED | OUTPATIENT
Start: 2024-12-01 | End: 2024-12-01

## 2024-12-01 RX ADMIN — ONDANSETRON 4 MG: 2 INJECTION INTRAMUSCULAR; INTRAVENOUS at 17:23

## 2024-12-01 RX ADMIN — ACETAMINOPHEN 650 MG: 325 TABLET ORAL at 18:24

## 2024-12-01 ASSESSMENT — PAIN DESCRIPTION - DESCRIPTORS: DESCRIPTORS: CRUSHING

## 2024-12-01 ASSESSMENT — PAIN - FUNCTIONAL ASSESSMENT: PAIN_FUNCTIONAL_ASSESSMENT: 0-10

## 2024-12-01 ASSESSMENT — PAIN DESCRIPTION - LOCATION: LOCATION: CHEST

## 2024-12-01 ASSESSMENT — PAIN SCALES - GENERAL
PAINLEVEL_OUTOF10: 8
PAINLEVEL_OUTOF10: 8

## 2024-12-01 ASSESSMENT — PAIN DESCRIPTION - ORIENTATION: ORIENTATION: LEFT

## 2024-12-01 NOTE — ED PROVIDER NOTES
mouth daily (before dinner), Disp-30 tablet, R-3Normal      losartan (COZAAR) 50 MG tablet Take 1 tablet by mouth every morning (before breakfast), Disp-30 tablet, R-3Normal      amLODIPine (NORVASC) 5 MG tablet Take 1 tablet by mouth daily, Disp-30 tablet, R-0Normal      QUEtiapine (SEROQUEL) 50 MG tablet Take 2 tablets by mouth nightlyHistorical Med      hydrALAZINE (APRESOLINE) 25 MG tablet Take 1 tablet by mouth 3 times dailyHistorical Med      predniSONE (DELTASONE) 10 MG tablet Take 1 tablet by mouth dailyHistorical Med      insulin glargine (BASAGLAR KWIKPEN) 100 UNIT/ML injection pen Inject 8 Units into the skin nightly, Disp-8 Adjustable Dose Pre-filled Pen Syringe, R-0Normal      DULoxetine (CYMBALTA) 60 MG extended release capsule Take 60 capsules by mouth dailyHistorical Med      atorvastatin (LIPITOR) 80 MG tablet TAKE 1 TABLET BY MOUTH NIGHTLY, Disp-90 tablet, R-5Normal      levETIRAcetam (KEPPRA) 500 MG tablet Take 1 tablet by mouth 2 times daily, Disp-60 tablet, R-3Normal      isosorbide mononitrate (IMDUR) 60 MG extended release tablet Take 1 tablet by mouth in the morning and at bedtimeHistorical Med      Insulin Aspart (NOVOLOG FLEXPEN SC) Inject 4 Units into the skin 3 times daily (with meals)Historical Med      ticagrelor (BRILINTA) 90 MG TABS tablet Take 1 tablet by mouth 2 times dailyHistorical Med      Calcium Carb-Cholecalciferol (OYSTER SHELL CALCIUM W/D) 500-5 MG-MCG TABS tablet Take 1 tablet by mouth dailyHistorical Med      cyclobenzaprine (FLEXERIL) 5 MG tablet Take 1 tablet by mouth 3 times daily as neededHistorical Med      NURTEC 75 MG TBDP Take 1 tablet by mouth once as needed (Migraine.), DAWHistorical Med      traMADol (ULTRAM) 50 MG tablet Take 1 tablet by mouth every 6 hours as needed for Pain.Historical Med      aspirin (ASPIRIN CHILDRENS) 81 MG chewable tablet Take 1 tablet by mouth daily, Disp-30 tablet, R-3Normal      ranolazine (RANEXA) 1000 MG extended release tablet TAKE

## 2024-12-02 NOTE — TELEPHONE ENCOUNTER
Medication:   Requested Prescriptions     Pending Prescriptions Disp Refills    omeprazole (PRILOSEC) 20 MG delayed release capsule [Pharmacy Med Name: OMEPRAZOLE 20 MG CPDR 20 Capsule] 28 capsule 1     Sig: TAKE 1 CAPSULE BY MOUTH DAILY        Last Filled:      Patient Phone Number: [phone] (home)     Last appt: 7/21/2023   Next appt: Visit date not found    Last [place]:       3/30/2023     1:54 PM   RX Monitoring   Periodic Controlled Substance Monitoring Possible medication side effects, risk of tolerance/dependence & alternative treatments discussed.;No signs of potential drug abuse or diversion identified.

## 2024-12-03 ENCOUNTER — TELEPHONE (OUTPATIENT)
Dept: CARDIOLOGY CLINIC | Age: 69
End: 2024-12-03

## 2024-12-03 LAB
EKG ATRIAL RATE: 60 BPM
EKG DIAGNOSIS: NORMAL
EKG P AXIS: -30 DEGREES
EKG P-R INTERVAL: 88 MS
EKG Q-T INTERVAL: 424 MS
EKG QRS DURATION: 90 MS
EKG QTC CALCULATION (BAZETT): 433 MS
EKG R AXIS: 25 DEGREES
EKG T AXIS: 111 DEGREES
EKG VENTRICULAR RATE: 63 BPM

## 2024-12-03 PROCEDURE — 93010 ELECTROCARDIOGRAM REPORT: CPT | Performed by: INTERNAL MEDICINE

## 2024-12-03 NOTE — TELEPHONE ENCOUNTER
New Patient Referral    Referring Provider Name:Gretchen Wing   Phone Number:[phone]  Fax Number:ER  Address: ER    Diagnosis/Reason for Visit:Chest pain / Hypertension    Cardiac Clearance? no    Cardiac Testing: (Yes/No/Unsure) no    Date testing was completed?___________      Have records been requested? (Yes/No)no    Preferred Language:__english__    needed? (Yes/No)no    12/3 pt is a current pt of [facility].  LM for pt to call if she wants to transition to [facility].  Left [facility] phone numbers as she is closer to [place].    12/06 spoke to pt, she is scheduled with Dr Alberto estrella because they believe her chest pain is due to vascular disease.    She is scheduled for 12/10

## 2024-12-05 ENCOUNTER — HOSPITAL ENCOUNTER (EMERGENCY)
Age: 69
Discharge: LEFT BEFORE DISCHARGE | DRG: 291 | End: 2024-12-05
Attending: EMERGENCY MEDICINE
Payer: COMMERCIAL

## 2024-12-05 VITALS
BODY MASS INDEX: 22.07 KG/M2 | OXYGEN SATURATION: 100 % | HEART RATE: 58 BPM | DIASTOLIC BLOOD PRESSURE: 92 MMHG | TEMPERATURE: 98 F | WEIGHT: 112.43 LBS | HEIGHT: 60 IN | RESPIRATION RATE: 16 BRPM | SYSTOLIC BLOOD PRESSURE: 168 MMHG

## 2024-12-05 DIAGNOSIS — M79.661 RIGHT CALF PAIN: Primary | ICD-10-CM

## 2024-12-05 LAB
EKG ATRIAL RATE: 60 BPM
EKG DIAGNOSIS: NORMAL
EKG Q-T INTERVAL: 452 MS
EKG QRS DURATION: 90 MS
EKG QTC CALCULATION (BAZETT): 443 MS
EKG R AXIS: 23 DEGREES
EKG T AXIS: 98 DEGREES
EKG VENTRICULAR RATE: 58 BPM

## 2024-12-05 PROCEDURE — 93010 ELECTROCARDIOGRAM REPORT: CPT | Performed by: INTERNAL MEDICINE

## 2024-12-05 PROCEDURE — 99283 EMERGENCY DEPT VISIT LOW MDM: CPT

## 2024-12-05 PROCEDURE — 4500000002 HC ER NO CHARGE

## 2024-12-05 PROCEDURE — 93005 ELECTROCARDIOGRAM TRACING: CPT | Performed by: EMERGENCY MEDICINE

## 2024-12-05 ASSESSMENT — PAIN DESCRIPTION - LOCATION: LOCATION: LEG

## 2024-12-05 ASSESSMENT — ENCOUNTER SYMPTOMS
NAUSEA: 0
BACK PAIN: 0
SHORTNESS OF BREATH: 0
RHINORRHEA: 0
CONSTIPATION: 0
DIARRHEA: 0
COUGH: 0
ABDOMINAL PAIN: 0
EYE PAIN: 0
EYE REDNESS: 0
VOMITING: 0

## 2024-12-05 ASSESSMENT — PAIN DESCRIPTION - DESCRIPTORS: DESCRIPTORS: ACHING;SORE

## 2024-12-05 ASSESSMENT — PAIN DESCRIPTION - ORIENTATION: ORIENTATION: RIGHT

## 2024-12-05 ASSESSMENT — PAIN SCALES - GENERAL
PAINLEVEL_OUTOF10: 6
PAINLEVEL_OUTOF10: 5

## 2024-12-05 NOTE — ED NOTES
The patient is being transferred to [facility]. The patient refuses to be taken by medical transport. The risks and benefits of refusing medical transport have been reviewed with the patient. Patient has been instructed to travel directly to [place] without eating or drinking. The patient is being transferred to ED. The patient refuses to be taken by medical transport. The risks and benefits of refusing medical transport have been reviewed with the patient. Patient has been instructed to travel directly to [place] ED without eating or drinking.

## 2024-12-05 NOTE — ED PROVIDER NOTES
[facility]  EMERGENCY DEPARTMENT ENCOUNTER        Pt Name: Maren Meza  MRN: [id number]  Birthdate 1955  Date of evaluation: 12/5/2024  Provider: Anuj Cooper DO  PCP: Marin Cardenas MD  Note Started: 8:19 AM EST 12/5/24    CHIEF COMPLAINT      Right Calf Pain    HISTORY OF PRESENT ILLNESS: 1 or more Elements     Chief Complaint   Patient presents with    Leg Pain     Right calf pain for over a week  spoke to pmd encouraged to come in due to hx of  Stents and has a Watchman     History from : Patient  Limitations to history : None    Maren Meza is a 69 y.o. female who presents to the emergency department secondary to concern for right calf pain.  Reports that the right calf has been hurting her over the past week or so.  States that she spoke to her home nurse and had a telemetry doctor who then advised her to go to emergency department for further evaluation. Patient does have a history of MI with heart stents. She has been seen in the ED several times over the last few months for complaints of chest pain, however she does not complain of any chest pain currently.    Past medical history noted below. Aside from what is stated above denies any other symptoms or modifying factors.   reports that she quit smoking about 6 years ago. Her smoking use included cigarettes. She started smoking about 41 years ago. She has a 17.5 pack-year smoking history. She has never used smokeless tobacco. She reports that she does not drink alcohol and does not use drugs.  Nursing Notes were all reviewed and agreed with or any disagreements addressed in HPI/MDM.  REVIEW OF SYSTEMS :    Review of Systems   Constitutional:  Negative for chills and fever.   HENT:  Negative for congestion and rhinorrhea.    Eyes:  Negative for pain and redness.   Respiratory:  Negative for cough and shortness of breath.    Cardiovascular:  Negative for chest pain and leg swelling.   Gastrointestinal:

## 2024-12-05 NOTE — ED NOTES
This RN called and patient states she is at home.  She was worried about how she would get home so she left.  She is going to try to call and make an outpatient appointment

## 2024-12-05 NOTE — ED NOTES
Left per anam  Called [facility]  Aware pt coming by private car  Walked out to car  states pain has lessened

## 2024-12-07 ENCOUNTER — APPOINTMENT (OUTPATIENT)
Dept: GENERAL RADIOLOGY | Age: 69
DRG: 291 | End: 2024-12-07
Payer: COMMERCIAL

## 2024-12-07 ENCOUNTER — APPOINTMENT (OUTPATIENT)
Dept: CT IMAGING | Age: 69
DRG: 291 | End: 2024-12-07
Payer: COMMERCIAL

## 2024-12-07 ENCOUNTER — HOSPITAL ENCOUNTER (INPATIENT)
Age: 69
LOS: 9 days | Discharge: HOME | DRG: 291 | End: 2024-12-16
Attending: STUDENT IN AN ORGANIZED HEALTH CARE EDUCATION/TRAINING PROGRAM | Admitting: HOSPITALIST
Payer: COMMERCIAL

## 2024-12-07 DIAGNOSIS — I50.23 ACUTE ON CHRONIC SYSTOLIC CONGESTIVE HEART FAILURE (HCC): ICD-10-CM

## 2024-12-07 DIAGNOSIS — Z71.89 GOALS OF CARE, COUNSELING/DISCUSSION: ICD-10-CM

## 2024-12-07 DIAGNOSIS — R07.9 CHEST PAIN, UNSPECIFIED TYPE: ICD-10-CM

## 2024-12-07 DIAGNOSIS — R79.89 ELEVATED TROPONIN: ICD-10-CM

## 2024-12-07 DIAGNOSIS — N18.31 CKD STAGE 3A, GFR 45-59 ML/MIN (HCC): Primary | ICD-10-CM

## 2024-12-07 DIAGNOSIS — N18.9 CHRONIC KIDNEY DISEASE, UNSPECIFIED CKD STAGE: ICD-10-CM

## 2024-12-07 PROBLEM — I50.813 ACUTE ON CHRONIC SYSTOLIC RIGHT HEART FAILURE (HCC): Status: ACTIVE | Noted: 2024-12-07

## 2024-12-07 PROBLEM — I50.813 ACUTE ON CHRONIC SYSTOLIC RIGHT HEART FAILURE (HCC): Status: RESOLVED | Noted: 2024-12-07 | Resolved: 2024-12-07

## 2024-12-07 LAB
ALBUMIN SERPL-MCNC: 4 G/DL (ref 3.4–5)
ALBUMIN/GLOB SERPL: 1.1 {RATIO} (ref 1.1–2.2)
ALP SERPL-CCNC: 152 U/L (ref 40–129)
ALT SERPL-CCNC: 33 U/L (ref 10–40)
ANION GAP SERPL CALCULATED.3IONS-SCNC: 15 MMOL/L (ref 3–16)
APTT BLD: 32.4 SEC (ref 22.1–36.4)
AST SERPL-CCNC: 49 U/L (ref 15–37)
BASOPHILS # BLD: 0 K/UL (ref 0–0.2)
BASOPHILS NFR BLD: 0.5 %
BILIRUB SERPL-MCNC: 0.3 MG/DL (ref 0–1)
BUN SERPL-MCNC: 26 MG/DL (ref 7–20)
CALCIUM SERPL-MCNC: 9.8 MG/DL (ref 8.3–10.6)
CHLORIDE SERPL-SCNC: 107 MMOL/L (ref 99–110)
CO2 SERPL-SCNC: 17 MMOL/L (ref 21–32)
CREAT SERPL-MCNC: 2.2 MG/DL (ref 0.6–1.2)
DEPRECATED RDW RBC AUTO: 15.1 % (ref 12.4–15.4)
EOSINOPHIL # BLD: 0.1 K/UL (ref 0–0.6)
EOSINOPHIL NFR BLD: 3.2 %
GFR SERPLBLD CREATININE-BSD FMLA CKD-EPI: 24 ML/MIN/{1.73_M2}
GLUCOSE SERPL-MCNC: 110 MG/DL (ref 70–99)
HCT VFR BLD AUTO: 39.1 % (ref 36–48)
HGB BLD-MCNC: 12.6 G/DL (ref 12–16)
INR PPP: 1.06 (ref 0.85–1.15)
LYMPHOCYTES # BLD: 1.4 K/UL (ref 1–5.1)
LYMPHOCYTES NFR BLD: 31.3 %
MCH RBC QN AUTO: 31 PG (ref 26–34)
MCHC RBC AUTO-ENTMCNC: 32.2 G/DL (ref 31–36)
MCV RBC AUTO: 96.3 FL (ref 80–100)
MONOCYTES # BLD: 0.2 K/UL (ref 0–1.3)
MONOCYTES NFR BLD: 5.1 %
NEUTROPHILS # BLD: 2.7 K/UL (ref 1.7–7.7)
NEUTROPHILS NFR BLD: 59.9 %
NT-PROBNP SERPL-MCNC: 1772 PG/ML (ref 0–124)
PLATELET # BLD AUTO: 160 K/UL (ref 135–450)
PMV BLD AUTO: 8.6 FL (ref 5–10.5)
POTASSIUM SERPL-SCNC: 3.8 MMOL/L (ref 3.5–5.1)
PROT SERPL-MCNC: 7.8 G/DL (ref 6.4–8.2)
PROTHROMBIN TIME: 14.1 SEC (ref 11.9–14.9)
RBC # BLD AUTO: 4.06 M/UL (ref 4–5.2)
SODIUM SERPL-SCNC: 139 MMOL/L (ref 136–145)
TROPONIN, HIGH SENSITIVITY: 46 NG/L (ref 0–14)
TROPONIN, HIGH SENSITIVITY: 54 NG/L (ref 0–14)
WBC # BLD AUTO: 4.5 K/UL (ref 4–11)

## 2024-12-07 PROCEDURE — 96374 THER/PROPH/DIAG INJ IV PUSH: CPT

## 2024-12-07 PROCEDURE — 80053 COMPREHEN METABOLIC PANEL: CPT

## 2024-12-07 PROCEDURE — 6360000002 HC RX W HCPCS

## 2024-12-07 PROCEDURE — 85730 THROMBOPLASTIN TIME PARTIAL: CPT

## 2024-12-07 PROCEDURE — 71045 X-RAY EXAM CHEST 1 VIEW: CPT

## 2024-12-07 PROCEDURE — 96375 TX/PRO/DX INJ NEW DRUG ADDON: CPT

## 2024-12-07 PROCEDURE — 71250 CT THORAX DX C-: CPT

## 2024-12-07 PROCEDURE — 93005 ELECTROCARDIOGRAM TRACING: CPT | Performed by: STUDENT IN AN ORGANIZED HEALTH CARE EDUCATION/TRAINING PROGRAM

## 2024-12-07 PROCEDURE — 99285 EMERGENCY DEPT VISIT HI MDM: CPT

## 2024-12-07 PROCEDURE — 6370000000 HC RX 637 (ALT 250 FOR IP)

## 2024-12-07 PROCEDURE — 2060000000 HC ICU INTERMEDIATE R&B

## 2024-12-07 PROCEDURE — 85610 PROTHROMBIN TIME: CPT

## 2024-12-07 PROCEDURE — 74174 CTA ABD&PLVS W/CONTRAST: CPT

## 2024-12-07 PROCEDURE — 6360000004 HC RX CONTRAST MEDICATION

## 2024-12-07 PROCEDURE — 84484 ASSAY OF TROPONIN QUANT: CPT

## 2024-12-07 PROCEDURE — 85025 COMPLETE CBC W/AUTO DIFF WBC: CPT

## 2024-12-07 PROCEDURE — 83880 ASSAY OF NATRIURETIC PEPTIDE: CPT

## 2024-12-07 RX ORDER — ASPIRIN 81 MG/1
81 TABLET ORAL DAILY
Status: DISCONTINUED | OUTPATIENT
Start: 2024-12-08 | End: 2024-12-08

## 2024-12-07 RX ORDER — MORPHINE SULFATE 2 MG/ML
2 INJECTION, SOLUTION INTRAMUSCULAR; INTRAVENOUS
Status: DISCONTINUED | OUTPATIENT
Start: 2024-12-07 | End: 2024-12-09

## 2024-12-07 RX ORDER — IOPAMIDOL 755 MG/ML
75 INJECTION, SOLUTION INTRAVASCULAR
Status: COMPLETED | OUTPATIENT
Start: 2024-12-07 | End: 2024-12-07

## 2024-12-07 RX ORDER — HYDRALAZINE HYDROCHLORIDE 20 MG/ML
10 INJECTION INTRAMUSCULAR; INTRAVENOUS EVERY 6 HOURS PRN
Status: DISCONTINUED | OUTPATIENT
Start: 2024-12-07 | End: 2024-12-15

## 2024-12-07 RX ORDER — FENTANYL CITRATE 50 UG/ML
50 INJECTION, SOLUTION INTRAMUSCULAR; INTRAVENOUS ONCE
Status: COMPLETED | OUTPATIENT
Start: 2024-12-07 | End: 2024-12-07

## 2024-12-07 RX ORDER — ONDANSETRON 2 MG/ML
4 INJECTION INTRAMUSCULAR; INTRAVENOUS ONCE
Status: COMPLETED | OUTPATIENT
Start: 2024-12-07 | End: 2024-12-07

## 2024-12-07 RX ORDER — ATORVASTATIN CALCIUM 80 MG/1
80 TABLET, FILM COATED ORAL NIGHTLY
Status: DISCONTINUED | OUTPATIENT
Start: 2024-12-07 | End: 2024-12-08

## 2024-12-07 RX ORDER — MORPHINE SULFATE 4 MG/ML
4 INJECTION, SOLUTION INTRAMUSCULAR; INTRAVENOUS
Status: DISCONTINUED | OUTPATIENT
Start: 2024-12-07 | End: 2024-12-09

## 2024-12-07 RX ADMIN — ONDANSETRON 4 MG: 2 INJECTION INTRAMUSCULAR; INTRAVENOUS at 21:30

## 2024-12-07 RX ADMIN — FENTANYL CITRATE 50 MCG: 50 INJECTION INTRAMUSCULAR; INTRAVENOUS at 21:53

## 2024-12-07 RX ADMIN — NITROGLYCERIN 0.5 INCH: 20 OINTMENT TOPICAL at 20:07

## 2024-12-07 RX ADMIN — IOPAMIDOL 75 ML: 755 INJECTION, SOLUTION INTRAVENOUS at 20:58

## 2024-12-07 ASSESSMENT — PAIN DESCRIPTION - LOCATION
LOCATION: CHEST

## 2024-12-07 ASSESSMENT — PAIN SCALES - GENERAL
PAINLEVEL_OUTOF10: 9
PAINLEVEL_OUTOF10: 9
PAINLEVEL_OUTOF10: 10

## 2024-12-07 ASSESSMENT — PAIN - FUNCTIONAL ASSESSMENT
PAIN_FUNCTIONAL_ASSESSMENT: 0-10
PAIN_FUNCTIONAL_ASSESSMENT: 0-10

## 2024-12-07 ASSESSMENT — PAIN DESCRIPTION - DESCRIPTORS: DESCRIPTORS: PRESSURE

## 2024-12-07 ASSESSMENT — PAIN DESCRIPTION - ORIENTATION: ORIENTATION: LEFT

## 2024-12-08 LAB
ALBUMIN SERPL-MCNC: 3.4 G/DL (ref 3.4–5)
ALP SERPL-CCNC: 144 U/L (ref 40–129)
ALT SERPL-CCNC: 27 U/L (ref 10–40)
ANION GAP SERPL CALCULATED.3IONS-SCNC: 15 MMOL/L (ref 3–16)
AST SERPL-CCNC: 40 U/L (ref 15–37)
BILIRUB DIRECT SERPL-MCNC: <0.1 MG/DL (ref 0–0.3)
BILIRUB INDIRECT SERPL-MCNC: ABNORMAL MG/DL (ref 0–1)
BILIRUB SERPL-MCNC: <0.2 MG/DL (ref 0–1)
BUN SERPL-MCNC: 25 MG/DL (ref 7–20)
CALCIUM SERPL-MCNC: 9.6 MG/DL (ref 8.3–10.6)
CHLORIDE SERPL-SCNC: 108 MMOL/L (ref 99–110)
CHOLEST SERPL-MCNC: 125 MG/DL (ref 0–199)
CO2 SERPL-SCNC: 16 MMOL/L (ref 21–32)
CREAT SERPL-MCNC: 2 MG/DL (ref 0.6–1.2)
EKG ATRIAL RATE: 63 BPM
EKG DIAGNOSIS: NORMAL
EKG P AXIS: -4 DEGREES
EKG P-R INTERVAL: 118 MS
EKG Q-T INTERVAL: 438 MS
EKG QRS DURATION: 88 MS
EKG QTC CALCULATION (BAZETT): 448 MS
EKG R AXIS: 50 DEGREES
EKG T AXIS: 1 DEGREES
EKG VENTRICULAR RATE: 63 BPM
FERRITIN SERPL IA-MCNC: 97.3 NG/ML (ref 15–150)
GFR SERPLBLD CREATININE-BSD FMLA CKD-EPI: 27 ML/MIN/{1.73_M2}
GLUCOSE BLD-MCNC: 188 MG/DL (ref 70–99)
GLUCOSE BLD-MCNC: 241 MG/DL (ref 70–99)
GLUCOSE BLD-MCNC: 256 MG/DL (ref 70–99)
GLUCOSE BLD-MCNC: 86 MG/DL (ref 70–99)
GLUCOSE BLD-MCNC: 99 MG/DL (ref 70–99)
GLUCOSE SERPL-MCNC: 88 MG/DL (ref 70–99)
HDLC SERPL-MCNC: 47 MG/DL (ref 40–60)
IRON SATN MFR SERPL: 16 % (ref 15–50)
IRON SERPL-MCNC: 38 UG/DL (ref 37–145)
LDLC SERPL CALC-MCNC: 61 MG/DL
MAGNESIUM SERPL-MCNC: 1.94 MG/DL (ref 1.8–2.4)
PERFORMED ON: ABNORMAL
PERFORMED ON: NORMAL
PERFORMED ON: NORMAL
POTASSIUM SERPL-SCNC: 4.3 MMOL/L (ref 3.5–5.1)
PROT SERPL-MCNC: 6.8 G/DL (ref 6.4–8.2)
SODIUM SERPL-SCNC: 139 MMOL/L (ref 136–145)
TIBC SERPL-MCNC: 231 UG/DL (ref 260–445)
TRIGL SERPL-MCNC: 86 MG/DL (ref 0–150)
TSH SERPL DL<=0.005 MIU/L-ACNC: 1.67 UIU/ML (ref 0.27–4.2)
VLDLC SERPL CALC-MCNC: 17 MG/DL

## 2024-12-08 PROCEDURE — 83550 IRON BINDING TEST: CPT

## 2024-12-08 PROCEDURE — 80048 BASIC METABOLIC PNL TOTAL CA: CPT

## 2024-12-08 PROCEDURE — 80076 HEPATIC FUNCTION PANEL: CPT

## 2024-12-08 PROCEDURE — 6370000000 HC RX 637 (ALT 250 FOR IP): Performed by: HOSPITALIST

## 2024-12-08 PROCEDURE — 93010 ELECTROCARDIOGRAM REPORT: CPT | Performed by: INTERNAL MEDICINE

## 2024-12-08 PROCEDURE — 83540 ASSAY OF IRON: CPT

## 2024-12-08 PROCEDURE — 99223 1ST HOSP IP/OBS HIGH 75: CPT | Performed by: INTERNAL MEDICINE

## 2024-12-08 PROCEDURE — 94640 AIRWAY INHALATION TREATMENT: CPT

## 2024-12-08 PROCEDURE — 94760 N-INVAS EAR/PLS OXIMETRY 1: CPT

## 2024-12-08 PROCEDURE — 2060000000 HC ICU INTERMEDIATE R&B

## 2024-12-08 PROCEDURE — 82728 ASSAY OF FERRITIN: CPT

## 2024-12-08 PROCEDURE — 84443 ASSAY THYROID STIM HORMONE: CPT

## 2024-12-08 PROCEDURE — 6360000002 HC RX W HCPCS: Performed by: HOSPITALIST

## 2024-12-08 PROCEDURE — 2580000003 HC RX 258: Performed by: HOSPITALIST

## 2024-12-08 PROCEDURE — 36415 COLL VENOUS BLD VENIPUNCTURE: CPT

## 2024-12-08 PROCEDURE — 83735 ASSAY OF MAGNESIUM: CPT

## 2024-12-08 PROCEDURE — 80061 LIPID PANEL: CPT

## 2024-12-08 RX ORDER — POLYETHYLENE GLYCOL 3350 17 G/17G
17 POWDER, FOR SOLUTION ORAL DAILY PRN
Status: DISCONTINUED | OUTPATIENT
Start: 2024-12-08 | End: 2024-12-16 | Stop reason: HOSPADM

## 2024-12-08 RX ORDER — GLUCAGON 1 MG/ML
1 KIT INJECTION PRN
Status: DISCONTINUED | OUTPATIENT
Start: 2024-12-08 | End: 2024-12-16 | Stop reason: HOSPADM

## 2024-12-08 RX ORDER — DEXTROSE MONOHYDRATE 100 MG/ML
INJECTION, SOLUTION INTRAVENOUS CONTINUOUS PRN
Status: DISCONTINUED | OUTPATIENT
Start: 2024-12-08 | End: 2024-12-16 | Stop reason: HOSPADM

## 2024-12-08 RX ORDER — CYCLOBENZAPRINE HCL 10 MG
10 TABLET ORAL 3 TIMES DAILY PRN
COMMUNITY
Start: 2024-12-03 | End: 2024-12-17

## 2024-12-08 RX ORDER — INSULIN LISPRO 100 [IU]/ML
0-4 INJECTION, SOLUTION INTRAVENOUS; SUBCUTANEOUS
Status: DISCONTINUED | OUTPATIENT
Start: 2024-12-08 | End: 2024-12-11

## 2024-12-08 RX ORDER — TRAMADOL HYDROCHLORIDE 50 MG/1
50 TABLET ORAL EVERY 6 HOURS PRN
Status: DISCONTINUED | OUTPATIENT
Start: 2024-12-08 | End: 2024-12-15

## 2024-12-08 RX ORDER — LEVETIRACETAM 500 MG/1
500 TABLET ORAL 2 TIMES DAILY
Status: DISCONTINUED | OUTPATIENT
Start: 2024-12-08 | End: 2024-12-16 | Stop reason: HOSPADM

## 2024-12-08 RX ORDER — FERROUS SULFATE 325(65) MG
325 TABLET ORAL
Status: DISCONTINUED | OUTPATIENT
Start: 2024-12-08 | End: 2024-12-16 | Stop reason: HOSPADM

## 2024-12-08 RX ORDER — METOPROLOL SUCCINATE 50 MG/1
50 TABLET, EXTENDED RELEASE ORAL 2 TIMES DAILY
Status: DISCONTINUED | OUTPATIENT
Start: 2024-12-08 | End: 2024-12-16 | Stop reason: HOSPADM

## 2024-12-08 RX ORDER — BUDESONIDE AND FORMOTEROL FUMARATE DIHYDRATE 80; 4.5 UG/1; UG/1
2 AEROSOL RESPIRATORY (INHALATION)
Status: DISCONTINUED | OUTPATIENT
Start: 2024-12-08 | End: 2024-12-16 | Stop reason: HOSPADM

## 2024-12-08 RX ORDER — ONDANSETRON 2 MG/ML
4 INJECTION INTRAMUSCULAR; INTRAVENOUS EVERY 6 HOURS PRN
Status: DISCONTINUED | OUTPATIENT
Start: 2024-12-08 | End: 2024-12-16 | Stop reason: HOSPADM

## 2024-12-08 RX ORDER — AMITRIPTYLINE HYDROCHLORIDE 10 MG/1
40 TABLET ORAL NIGHTLY
Status: DISCONTINUED | OUTPATIENT
Start: 2024-12-08 | End: 2024-12-16 | Stop reason: HOSPADM

## 2024-12-08 RX ORDER — PANTOPRAZOLE SODIUM 40 MG/1
40 TABLET, DELAYED RELEASE ORAL
Status: DISCONTINUED | OUTPATIENT
Start: 2024-12-08 | End: 2024-12-16 | Stop reason: HOSPADM

## 2024-12-08 RX ORDER — FLUTICASONE PROPIONATE 50 MCG
1 SPRAY, SUSPENSION (ML) NASAL DAILY
Status: DISCONTINUED | OUTPATIENT
Start: 2024-12-08 | End: 2024-12-16 | Stop reason: HOSPADM

## 2024-12-08 RX ORDER — GLUCAGON 1 MG/ML
1 KIT INJECTION PRN
Status: DISCONTINUED | OUTPATIENT
Start: 2024-12-08 | End: 2024-12-08

## 2024-12-08 RX ORDER — SODIUM CHLORIDE 9 MG/ML
INJECTION, SOLUTION INTRAVENOUS PRN
Status: DISCONTINUED | OUTPATIENT
Start: 2024-12-08 | End: 2024-12-16 | Stop reason: HOSPADM

## 2024-12-08 RX ORDER — CYCLOBENZAPRINE HCL 10 MG
5 TABLET ORAL 3 TIMES DAILY PRN
Status: DISCONTINUED | OUTPATIENT
Start: 2024-12-08 | End: 2024-12-10

## 2024-12-08 RX ORDER — SODIUM CHLORIDE 0.9 % (FLUSH) 0.9 %
5-40 SYRINGE (ML) INJECTION EVERY 12 HOURS SCHEDULED
Status: DISCONTINUED | OUTPATIENT
Start: 2024-12-08 | End: 2024-12-16 | Stop reason: HOSPADM

## 2024-12-08 RX ORDER — ATORVASTATIN CALCIUM 80 MG/1
80 TABLET, FILM COATED ORAL NIGHTLY
Status: DISCONTINUED | OUTPATIENT
Start: 2024-12-08 | End: 2024-12-16 | Stop reason: HOSPADM

## 2024-12-08 RX ORDER — HYDROXYZINE HYDROCHLORIDE 25 MG/1
25 TABLET, FILM COATED ORAL 3 TIMES DAILY PRN
Status: DISCONTINUED | OUTPATIENT
Start: 2024-12-08 | End: 2024-12-09

## 2024-12-08 RX ORDER — LOSARTAN POTASSIUM 50 MG/1
50 TABLET ORAL
Status: DISCONTINUED | OUTPATIENT
Start: 2024-12-08 | End: 2024-12-09

## 2024-12-08 RX ORDER — ISOSORBIDE MONONITRATE 60 MG/1
60 TABLET, EXTENDED RELEASE ORAL 2 TIMES DAILY
Status: DISCONTINUED | OUTPATIENT
Start: 2024-12-08 | End: 2024-12-16 | Stop reason: HOSPADM

## 2024-12-08 RX ORDER — SODIUM CHLORIDE 0.9 % (FLUSH) 0.9 %
5-40 SYRINGE (ML) INJECTION PRN
Status: DISCONTINUED | OUTPATIENT
Start: 2024-12-08 | End: 2024-12-16 | Stop reason: HOSPADM

## 2024-12-08 RX ORDER — ACETAMINOPHEN 325 MG/1
650 TABLET ORAL EVERY 6 HOURS PRN
Status: DISCONTINUED | OUTPATIENT
Start: 2024-12-08 | End: 2024-12-16 | Stop reason: HOSPADM

## 2024-12-08 RX ORDER — HYDRALAZINE HYDROCHLORIDE 25 MG/1
25 TABLET, FILM COATED ORAL 3 TIMES DAILY
Status: DISCONTINUED | OUTPATIENT
Start: 2024-12-08 | End: 2024-12-10

## 2024-12-08 RX ORDER — QUETIAPINE FUMARATE 100 MG/1
100 TABLET, FILM COATED ORAL NIGHTLY
Status: DISCONTINUED | OUTPATIENT
Start: 2024-12-08 | End: 2024-12-16 | Stop reason: HOSPADM

## 2024-12-08 RX ORDER — ACETAMINOPHEN 650 MG/1
650 SUPPOSITORY RECTAL EVERY 6 HOURS PRN
Status: DISCONTINUED | OUTPATIENT
Start: 2024-12-08 | End: 2024-12-09

## 2024-12-08 RX ORDER — ASPIRIN 81 MG/1
81 TABLET, CHEWABLE ORAL DAILY
Status: DISCONTINUED | OUTPATIENT
Start: 2024-12-08 | End: 2024-12-16 | Stop reason: HOSPADM

## 2024-12-08 RX ORDER — INSULIN LISPRO 100 [IU]/ML
0-4 INJECTION, SOLUTION INTRAVENOUS; SUBCUTANEOUS ONCE
Status: COMPLETED | OUTPATIENT
Start: 2024-12-08 | End: 2024-12-08

## 2024-12-08 RX ORDER — AMLODIPINE BESYLATE 5 MG/1
5 TABLET ORAL DAILY
Status: DISCONTINUED | OUTPATIENT
Start: 2024-12-08 | End: 2024-12-08

## 2024-12-08 RX ORDER — RANOLAZINE 500 MG/1
1000 TABLET, EXTENDED RELEASE ORAL 2 TIMES DAILY
Status: DISCONTINUED | OUTPATIENT
Start: 2024-12-08 | End: 2024-12-16 | Stop reason: HOSPADM

## 2024-12-08 RX ORDER — FUROSEMIDE 10 MG/ML
40 INJECTION INTRAMUSCULAR; INTRAVENOUS 2 TIMES DAILY
Status: DISCONTINUED | OUTPATIENT
Start: 2024-12-08 | End: 2024-12-08

## 2024-12-08 RX ORDER — PREDNISONE 10 MG/1
10 TABLET ORAL DAILY
Status: DISCONTINUED | OUTPATIENT
Start: 2024-12-08 | End: 2024-12-16 | Stop reason: HOSPADM

## 2024-12-08 RX ORDER — ENOXAPARIN SODIUM 100 MG/ML
1 INJECTION SUBCUTANEOUS DAILY
Status: DISCONTINUED | OUTPATIENT
Start: 2024-12-08 | End: 2024-12-10

## 2024-12-08 RX ORDER — MONTELUKAST SODIUM 10 MG/1
10 TABLET ORAL DAILY
Status: DISCONTINUED | OUTPATIENT
Start: 2024-12-08 | End: 2024-12-16 | Stop reason: HOSPADM

## 2024-12-08 RX ORDER — DULOXETIN HYDROCHLORIDE 60 MG/1
60 CAPSULE, DELAYED RELEASE ORAL DAILY
Status: DISCONTINUED | OUTPATIENT
Start: 2024-12-08 | End: 2024-12-16 | Stop reason: HOSPADM

## 2024-12-08 RX ORDER — ONDANSETRON 4 MG/1
4 TABLET, ORALLY DISINTEGRATING ORAL EVERY 8 HOURS PRN
Status: DISCONTINUED | OUTPATIENT
Start: 2024-12-08 | End: 2024-12-16 | Stop reason: HOSPADM

## 2024-12-08 RX ADMIN — TICAGRELOR 90 MG: 90 TABLET ORAL at 20:25

## 2024-12-08 RX ADMIN — CALCIUM CARBONATE-VITAMIN D TAB 500 MG-200 UNIT 1 TABLET: 500-200 TAB at 08:37

## 2024-12-08 RX ADMIN — METOPROLOL SUCCINATE 50 MG: 50 TABLET, EXTENDED RELEASE ORAL at 20:25

## 2024-12-08 RX ADMIN — QUETIAPINE FUMARATE 100 MG: 100 TABLET ORAL at 20:25

## 2024-12-08 RX ADMIN — FLUTICASONE PROPIONATE 1 SPRAY: 50 SPRAY, METERED NASAL at 08:38

## 2024-12-08 RX ADMIN — ATORVASTATIN CALCIUM 80 MG: 80 TABLET, FILM COATED ORAL at 20:25

## 2024-12-08 RX ADMIN — Medication 2 PUFF: at 19:40

## 2024-12-08 RX ADMIN — HYDRALAZINE HYDROCHLORIDE 25 MG: 25 TABLET ORAL at 20:25

## 2024-12-08 RX ADMIN — MORPHINE SULFATE 2 MG: 2 INJECTION, SOLUTION INTRAMUSCULAR; INTRAVENOUS at 01:17

## 2024-12-08 RX ADMIN — ISOSORBIDE MONONITRATE 60 MG: 60 TABLET ORAL at 08:37

## 2024-12-08 RX ADMIN — PANTOPRAZOLE SODIUM 40 MG: 40 TABLET, DELAYED RELEASE ORAL at 08:37

## 2024-12-08 RX ADMIN — METOPROLOL SUCCINATE 50 MG: 50 TABLET, EXTENDED RELEASE ORAL at 08:37

## 2024-12-08 RX ADMIN — HYDROXYZINE HYDROCHLORIDE 25 MG: 25 TABLET ORAL at 21:44

## 2024-12-08 RX ADMIN — ENOXAPARIN SODIUM 50 MG: 100 INJECTION SUBCUTANEOUS at 01:16

## 2024-12-08 RX ADMIN — AMITRIPTYLINE HYDROCHLORIDE 40 MG: 10 TABLET, FILM COATED ORAL at 20:25

## 2024-12-08 RX ADMIN — TICAGRELOR 90 MG: 90 TABLET ORAL at 08:37

## 2024-12-08 RX ADMIN — ISOSORBIDE MONONITRATE 60 MG: 60 TABLET ORAL at 20:25

## 2024-12-08 RX ADMIN — MORPHINE SULFATE 4 MG: 4 INJECTION, SOLUTION INTRAMUSCULAR; INTRAVENOUS at 16:19

## 2024-12-08 RX ADMIN — PREDNISONE 10 MG: 10 TABLET ORAL at 08:37

## 2024-12-08 RX ADMIN — FERROUS SULFATE TAB 325 MG (65 MG ELEMENTAL FE) 325 MG: 325 (65 FE) TAB at 08:37

## 2024-12-08 RX ADMIN — HYDRALAZINE HYDROCHLORIDE 10 MG: 20 INJECTION INTRAMUSCULAR; INTRAVENOUS at 03:38

## 2024-12-08 RX ADMIN — ATORVASTATIN CALCIUM 80 MG: 80 TABLET, FILM COATED ORAL at 01:17

## 2024-12-08 RX ADMIN — LEVETIRACETAM 500 MG: 500 TABLET, FILM COATED ORAL at 20:25

## 2024-12-08 RX ADMIN — INSULIN LISPRO 2 UNITS: 100 INJECTION, SOLUTION INTRAVENOUS; SUBCUTANEOUS at 18:46

## 2024-12-08 RX ADMIN — RANOLAZINE 1000 MG: 500 TABLET, FILM COATED, EXTENDED RELEASE ORAL at 20:25

## 2024-12-08 RX ADMIN — LEVETIRACETAM 500 MG: 500 TABLET, FILM COATED ORAL at 08:37

## 2024-12-08 RX ADMIN — MORPHINE SULFATE 4 MG: 4 INJECTION, SOLUTION INTRAMUSCULAR; INTRAVENOUS at 08:54

## 2024-12-08 RX ADMIN — SODIUM CHLORIDE, PRESERVATIVE FREE 10 ML: 5 INJECTION INTRAVENOUS at 08:38

## 2024-12-08 RX ADMIN — MORPHINE SULFATE 4 MG: 4 INJECTION, SOLUTION INTRAMUSCULAR; INTRAVENOUS at 13:17

## 2024-12-08 RX ADMIN — INSULIN LISPRO 1 UNITS: 100 INJECTION, SOLUTION INTRAVENOUS; SUBCUTANEOUS at 21:44

## 2024-12-08 RX ADMIN — MORPHINE SULFATE 4 MG: 4 INJECTION, SOLUTION INTRAMUSCULAR; INTRAVENOUS at 20:27

## 2024-12-08 RX ADMIN — FUROSEMIDE 40 MG: 10 INJECTION, SOLUTION INTRAMUSCULAR; INTRAVENOUS at 08:38

## 2024-12-08 RX ADMIN — LOSARTAN POTASSIUM 50 MG: 50 TABLET, FILM COATED ORAL at 08:37

## 2024-12-08 RX ADMIN — Medication 2 PUFF: at 09:24

## 2024-12-08 RX ADMIN — INSULIN LISPRO 1 UNITS: 100 INJECTION, SOLUTION INTRAVENOUS; SUBCUTANEOUS at 12:56

## 2024-12-08 RX ADMIN — RANOLAZINE 1000 MG: 500 TABLET, FILM COATED, EXTENDED RELEASE ORAL at 10:16

## 2024-12-08 RX ADMIN — SODIUM CHLORIDE, PRESERVATIVE FREE 10 ML: 5 INJECTION INTRAVENOUS at 20:25

## 2024-12-08 RX ADMIN — MORPHINE SULFATE 2 MG: 2 INJECTION, SOLUTION INTRAMUSCULAR; INTRAVENOUS at 05:18

## 2024-12-08 RX ADMIN — DULOXETINE HYDROCHLORIDE 60 MG: 60 CAPSULE, DELAYED RELEASE ORAL at 08:36

## 2024-12-08 RX ADMIN — ASPIRIN 81 MG: 81 TABLET, CHEWABLE ORAL at 08:37

## 2024-12-08 RX ADMIN — MONTELUKAST 10 MG: 10 TABLET, FILM COATED ORAL at 08:37

## 2024-12-08 ASSESSMENT — PAIN DESCRIPTION - DESCRIPTORS
DESCRIPTORS: ACHING
DESCRIPTORS: ACHING;HEAVINESS
DESCRIPTORS: ACHING
DESCRIPTORS: ACHING;HEAVINESS
DESCRIPTORS: ACHING
DESCRIPTORS: ACHING;HEAVINESS

## 2024-12-08 ASSESSMENT — PAIN DESCRIPTION - ORIENTATION
ORIENTATION: LEFT;MID
ORIENTATION: LEFT

## 2024-12-08 ASSESSMENT — PAIN SCALES - GENERAL
PAINLEVEL_OUTOF10: 7
PAINLEVEL_OUTOF10: 7
PAINLEVEL_OUTOF10: 6
PAINLEVEL_OUTOF10: 0
PAINLEVEL_OUTOF10: 7
PAINLEVEL_OUTOF10: 8

## 2024-12-08 ASSESSMENT — PAIN DESCRIPTION - LOCATION
LOCATION: CHEST
LOCATION: ARM;SHOULDER;CHEST
LOCATION: CHEST
LOCATION: CHEST

## 2024-12-08 ASSESSMENT — PAIN DESCRIPTION - PAIN TYPE: TYPE: ACUTE PAIN

## 2024-12-08 NOTE — CONSULTS
Cardiovascular Consultation     Attending Physician: Martha Lopes MD    PATIENT: Maren Meza  : 1955  MRN: [id number]    Reason for Consultation:   Chief Complaint   Patient presents with    Chest Pain     History of present illness:   Ms. Maren Meza is a 69 y.o. female patient of Dr. Donny Allred, with frequent admissions for chest pain, returned through ER yesterday.  Maren reports compliance with medications- able to list names and timing. She denies side effects. She states she is uncertain why her blood pressure has not been controlled. Denies any lifestyle changes/stressors. Reports chest pain and exertional dyspnea/fatigue. No shortness of breath at rest/edema. States dry weight is around 110 lbs and she has struggled to keep appetite to get to goal of 120 lbs.     Medical History:      Diagnosis Date    Acid reflux     Anemia     Anxiety and depression     Arthritis     Asthma     Atrial fibrillation (HCC)     CAD (coronary artery disease) 12/3/2012    Cerebral artery occlusion with cerebral infarction (HCC)     TIA\"\"S--right sided weakness & headache    CHF (congestive heart failure) ([facility])     Chronic kidney disease--stage III     40% kidney function    COPD (chronic obstructive pulmonary disease) (HCC)     DM2 (diabetes mellitus, type 2) ([facility])     Dysarthria     Fibromyalgia 2016    Headache(784.0) 2013    Hemisensory loss     History of blood transfusion 2020    pt denies having transfusion reaction    Hyperlipidemia     Hypertension     IBS (irritable bowel syndrome)     Inferior vena cava occlusion (HCC)     Keratitis     Meningioma (HCC)     MI, old     Neuropathy     Superior vena cava obstruction     Temporal arteritis (HCC) 2014    Wears glasses        Surgical History:      Procedure Laterality Date    ABLATION OF DYSRHYTHMIC FOCUS    and 2020    times 2    ARTERY BIOPSY Right 2014    RIGHT TEMPORAL ARTERY BIOPSY    CAROTID ARTERY SURGERY  Left     clean up per pt    CATARACT REMOVAL Bilateral     CHOLECYSTECTOMY      COLONOSCOPY N/A 2021    COLONOSCOPY WITH BIOPSY performed by Gera Matthews MD at [facility] ENDOSCOPY    COLONOSCOPY N/A 10/15/2021    COLONOSCOPY performed by Gera Matthews MD at [facility] ENDOSCOPY    COLONOSCOPY N/A 2024    COLONOSCOPY POLYPECTOMY SNARE/BIOPSY performed by Marcio Castillo MD at [facility] ENDOSCOPY    CORONARY ANGIOPLASTY WITH STENT PLACEMENT      HYSTERECTOMY (CERVIX STATUS UNKNOWN)      JOINT REPLACEMENT Right     KNEE ARTHROSCOPY Right     PTCA  10/2019    LAD and RCA inrtervention    TUNNELED VENOUS PORT PLACEMENT      left thigh.  SMART PORT-----Removed--total of 4 port placement and removal    UPPER GASTROINTESTINAL ENDOSCOPY N/A 2020    EGD DIAGNOSTIC ONLY performed by Jose Pickens MD at [facility] ENDOSCOPY    UPPER GASTROINTESTINAL ENDOSCOPY N/A 2024    ESOPHAGOGASTRODUODENOSCOPY BIOPSY performed by Marcio Castillo MD at [facility] ENDOSCOPY       Social History:  Social History     Socioeconomic History    Marital status:      Spouse name: Not on file    Number of children: 8    Years of education: Not on file    Highest education level: Not on file   Occupational History    Not on file   Tobacco Use    Smoking status: Former     Current packs/day: 0.00     Average packs/day: 0.5 packs/day for 35.0 years (17.5 ttl pk-yrs)     Types: Cigarettes     Start date: 1983     Quit date: 2018     Years since quittin.4    Smokeless tobacco: Never    Tobacco comments:     5/13/15 has not smoked since hospitalization - kh   Vaping Use    Vaping status: Never Used   Substance and Sexual Activity    Alcohol use: No     Alcohol/week: 0.0 standard drinks of alcohol    Drug use: Never    Sexual activity: Not Currently     Partners: Male   Other Topics Concern    Not on file   Social History Narrative    Not on file     Social Determinants of Health     Financial Resource Strain: Low Risk  (3/7/2023)     Moderate to severe right renal artery disease.  6. Status post cholecystectomy and hysterectomy.  7. Mild diverticulosis but no acute diverticulitis.  8. Mild subpleural fibrosis in the upper lobes.  9. Incidental atrial appendage device well positioned with exclusion of the  atrial appendage.  10. Retroperitoneal venous collaterals. These likely connect the chest venous  system to the abdominal systemic veins.    12/7/2024   HS Troponin  54, 46    Impression/Recommendations    Ms. Maren Meza is a 69 y.o. female patient of Dr. Donny Allred:      Hypertension, accelerated   Chronic troponin elevation  Chest pain, chronic, without anginal description  CAD: Hx. MVPCI, mild nonobstructive ISR by 3/2024 [facility]  Ischemic cardiomyopathy, LVEF 50% by 4/2024 [facility]  Gastritis/duodenitis   CKD  Hyperlipidemia, controlled    Diabetes mellitus, uncontrolled   Renal artery stenosis       In light of uncontrolled hypertension and CKD, will discuss renal artery disease, possibly severe, with Dr. Allred. Will work with below regimen given need for antianginals as well.    Hydralazine 25 mg tid  Losartan 50 mg  Metoprolol succinate 50 mg bid   Ranexa 1000 mg bid   Imdur 60 mg bid      Thank you for allowing me to participate in the care of your patient. Please do not hesitate to call.     Miranda Garcia DO, [facility]  Interventional Cardiology      [facility]  o: [phone] [address], [address]  [place] [address]      NOTE:  This report was transcribed using voice recognition software.  Every effort was made to ensure accuracy; however, inadvertent computerized transcription errors may be present.

## 2024-12-08 NOTE — H&P
V2.0  History and Physical      Name:  Maren Meza /Age/Sex: 1955  (69 y.o. female)   MRN & CSN:  [id number] & [id number] Encounter Date/Time: 2024 11:18 PM EST   Location:  [address] [place] [address] PCP: Marin Cardenas MD       Hospital Day: 1    Assessment and Plan:   Maren Meza is a 69 y.o. female with a pmh of CKD stage IIIa and heart failure with preserved ejection fraction who presents with Acute on chronic systolic right heart failure (HCC); and chest pain    Hospital Problems             Last Modified POA    Hypertensive emergency 2024 Yes    Paroxysmal A-fib (HCC) 2024 Yes    Chest pain 2024 Yes    Elevated troponin I level 2024 Yes    Acute on chronic diastolic heart failure (HCC) 2024 Yes    Type 2 diabetes mellitus with diabetic chronic kidney disease (HCC) 2024 Yes    Hypertensive heart and chronic kidney disease with heart failure and stage 1 through stage 4 chronic kidney disease, or unspecified chronic kidney disease (HCC) 2024 Yes    Overview Signed 2020  4:03 PM by Janie Cordero MA     Echo Diastolic Dysfunction 2016      Last Assessment & Plan:   Condition: stable    Discussed importance of medications, monitoring daily weights, maintaining low sodium diet, and watching fluid intake.    Follow up in: one year         CKD stage 3a, GFR 45-59 ml/min ([facility]) 2024 Yes    Type 2 diabetes mellitus with mild nonproliferative diabetic retinopathy without macular edema, bilateral ([facility]) 2024 Yes       Plan:  Review of echo she had a echocardiogram on 2020 full EF was 50% and there was asymmetric hypertrophy of the basal septum.  Place on  monitored bed on progressive care  Weight on admission to the floor; and then daily  Strict  I&O's  BNP was elevated, trend.  Check TSH, ferritin and iron battery  Lasix 40 mg IV twice daily ordered.  Trend BMP  Trend blood pressure  Fluid restriction of 1500 mls daily  2 g cardiac diet  Daily aspirin  ordered.  High intensity statin ordered.  Therapeutic dose of Lovenox ordered.  Cardiology consult.  Hypertensive emergency-resume home blood pressure medication.  As needed hydralazine ordered.  Trend blood pressure and adjust blood pressure medications as necessary.  Blood glucose is stable.  Trend BMP.  Accu-Chek with correction scale insulin ordered.  Hold home basal and prandial insulin.      Advance care planning:  Advanced care planning was discussed with patient for a total of 16 minutes.  Full code, DNR CC, DNR CCA, power of  and living will were discussed.  Patient elected to be a full code.  Disposition:   Current Living situation: Home  Expected Disposition: Home  Estimated D/C: 3 days    Diet Adult cardiac diet with fluid restriction   DVT Prophylaxis [] Lovenox, []  Heparin, [] SCDs, [] Ambulation,  [] Eliquis, [] Xarelto, [] Coumadin.  Therapeutic dose of Lovenox given for chest pain   Code Status Full code   Surrogate Decision Maker/ AMARA Carrasquillo;  daughter     Personally reviewed Lab Studies and Imaging     Discussed management of the case with  ED provider who recommended admission    EKG interpreted personally and results sinus rhythm with some T waves and some anterior depressions.    Imaging that was interpreted personally includes chest xray and results suggest congestive heart failure    Drugs that require monitoring for toxicity include Lasix and the method of monitoring was trending BMP        History from:     patient    History of Present Illness:     Chief Complaint: Chest pain  Maren Meza is a 69 y.o. female with a pmh of CKD stage IIIa and heart failure with preserved ejection fraction who presents with left-sided severe chest pain that started on the same day of presentation.  Associated with her symptoms is nausea.  The pain radiates to her left arm.  Further she reports dyspnea on exertion, orthopnea and paroxysmal nocturnal dyspnea.  Stating that she has gained about 3

## 2024-12-08 NOTE — ED PROVIDER NOTES
[facility] EMERGENCY DEPARTMENT  EMERGENCY DEPARTMENT ENCOUNTER        Pt Name: Maren Meza  MRN: [id number]  Birthdate 1955  Date of evaluation: 12/7/2024  Provider: Sasha Roberson PA-C  PCP: Marin Cardenas MD  Note Started: 8:03 PM EST 12/7/24       I have seen and evaluated this patient with my supervising physician Fransico Johnson DO.      CHIEF COMPLAINT       Chief Complaint   Patient presents with    Chest Pain       HISTORY OF PRESENT ILLNESS: 1 or more Elements     History From: Patient            Chief Complaint: Chest pain    Maren Meza is a 69 y.o. female with a history of multiple MIs, hypertension, CHF, COPD, hyperlipidemia, diabetes who presents to the ED with a concern of midsternal chest pain with radiation towards the left arm that started 90 minutes prior arrival to the ED.  Triage note mentioned numbness, but patient described it as having pain.  Patient said taking 2 nitro and 2 324 mg aspirin,   Says she had multiple stents and a watchman in place.  Endorses nausea  Denies cough, vomiting, abdominal pain, urinary or bowel symptoms.  Patient currently takes Brilinta    Nursing Notes were all reviewed and agreed with or any disagreements were addressed in the HPI.    REVIEW OF SYSTEMS :      Review of Systems    Positives and Pertinent negatives as per HPI.     SURGICAL HISTORY     Past Surgical History:   Procedure Laterality Date    ABLATION OF DYSRHYTHMIC FOCUS  1999  and 11/2020    times 2    ARTERY BIOPSY Right 04/23/2014    RIGHT TEMPORAL ARTERY BIOPSY    CAROTID ARTERY SURGERY Left     clean up per pt    CATARACT REMOVAL Bilateral     CHOLECYSTECTOMY      COLONOSCOPY N/A 4/9/2021    COLONOSCOPY WITH BIOPSY performed by Gear Matthews MD at [facility]OSCOPY    COLONOSCOPY N/A 10/15/2021    COLONOSCOPY performed by Gera Matthews MD at [facility] ENDOSCOPY    COLONOSCOPY N/A 7/2/2024    COLONOSCOPY POLYPECTOMY SNARE/BIOPSY performed by Marcio Castillo  occlusion with cerebral infarction (HCC), CHF (congestive heart failure) (HCC), Chronic kidney disease--stage III, COPD (chronic obstructive pulmonary disease) (HCC), DM2 (diabetes mellitus, type 2) (HCC), Dysarthria, Fibromyalgia (6/7/2016), Headache(784.0) (2/19/2013), Hemisensory loss, History of blood transfusion (11/2020), Hyperlipidemia, Hypertension, IBS (irritable bowel syndrome), Inferior vena cava occlusion (HCC), Keratitis, Meningioma (HCC), MI, old, Neuropathy, Superior vena cava obstruction, Temporal arteritis (HCC) (2/24/2014), and Wears glasses.    CONSULTS: (Who and What was discussed)  None      Records Reviewed (External and Source)     CC/HPI Summary, DDx, ED Course, and Reassessment:   Patient is a 61-year-old female with a history of multiple MIs, hypertension, CHF, COPD, hyperlipidemia and diabetes presenting to the ED with a concern of midsternal chest pain with radiation towards the left arm that started 90 minutes prior to arrival.  Patient took 2 nitroglycerin and 2x 324 mg of aspirin.  She also endorses nausea.    Ddx: ACS, AAA, pneumonia, PE, CHF, COPD     Physical examination show an alert and oriented  female, nontachycardic, temperature 97.5, SpO2 100% on room air.  Lungs were clear to auscultation upper and lower lobes bilaterally.  Heart auscultation was clear, no murmurs noted.  Abdominal examination was benign.  Radial pulse was equal bilaterally  NIHSS 0    Patient first troponin was 54, second troponin downtrending to 46, these values are consistent with patient's results, EKG showed a normal sinus rhythm, no acute ischemic changes.  Patient presents with a HEART score of 5.    Creatinine of 2.2, BUN of 26, GFR 24, these are consistent with patient's history.  No leukocytosis, hemoglobin of 12.6  Chest x-ray suggests CHF, CTA of the chest abdomen pelvis show no aneurysm or dissection, no evidence of PE.    Patient presents with a heart score of 5, and significant cardiac history  could the patient please be admitted to further manage  Hospitalist consult place,    The patient tolerated their visit well.  The patient and / or the family were informed of the results of any tests, a time was given to answer questions, a plan was proposed and they agreed with plan.    I am the Primary Clinician of Record.  FINAL IMPRESSION      1. Chest pain, unspecified type    2. Chronic kidney disease, unspecified CKD stage    3. Elevated troponin    4. Goals of care, counseling/discussion          DISPOSITION/PLAN     DISPOSITION Decision To Admit 12/07/2024 10:20:00 PM   DISPOSITION CONDITION Stable           PATIENT REFERRED TO:  No follow-up provider specified.    DISCHARGE MEDICATIONS:  New Prescriptions    No medications on file       DISCONTINUED MEDICATIONS:  Discontinued Medications    No medications on file              (Please note that portions of this note were completed with a voice recognition program.  Efforts were made to edit the dictations but occasionally words are mis-transcribed.)    Sasha Roberson PA-C (electronically signed)     Sasha Roberson PA-C  12/08/24 0403

## 2024-12-08 NOTE — ED PROVIDER NOTES
Emergency Department Attending Provider Note  Location: [facility] EMERGENCY DEPARTMENT  12/7/2024   Note Started: 8:01 PM EST 12/7/24       Patient Identification  Maren Meza is a 69 y.o. female      HPI:Maren Meza was evaluated in the Emergency Department for acute onset chest pain that started 2 hours ago.  Patient has an extensive cardiac history including multiple heart attacks as well as stents.  She states that this pain is worse than her prior pain and radiates to the left shoulder and arm as well as her back.  It is associated with shortness of breath and diaphoresis.  She had a full dose of aspirin prior to arrival.  She is not nauseous or vomiting.  States she is otherwise been compliant with her medications.  States that she has a watchman and is currently taking Xarelto.. Although initial history and physical exam information was obtained by STEPHEN/NPP/MD/ (who also dictated a record of this visit), I personally saw the patient and performed a substantive portion of the visit including all aspects of the medical decision making.      PHYSICAL EXAM:  Physical Exam  Constitutional:       General: She is not in acute distress.     Appearance: Normal appearance. She is ill-appearing.   HENT:      Head: Normocephalic and atraumatic.      Right Ear: External ear normal.      Left Ear: External ear normal.      Nose: Nose normal.      Mouth/Throat:      Mouth: Mucous membranes are moist.      Pharynx: Oropharynx is clear.   Cardiovascular:      Rate and Rhythm: Normal rate and regular rhythm.      Pulses: Normal pulses.      Heart sounds: Normal heart sounds.   Pulmonary:      Effort: Pulmonary effort is normal.      Breath sounds: Normal breath sounds.   Abdominal:      General: Abdomen is flat. There is no distension.      Palpations: Abdomen is soft.      Tenderness: There is no abdominal tenderness.   Musculoskeletal:         General: No tenderness, deformity or signs of injury.  by using Dragon Dictation system and may contain errors related to that system including errors in grammar, punctuation, and spelling, as well as words and phrases that may be inappropriate. If there are any questions or concerns please feel free to contact the dictating provider for clarification.     Fransico Johnson,    [employer]       Fransico Johnson, DO  12/07/24 [id number]       Fransico Johnson, DO  12/18/24 1529

## 2024-12-08 NOTE — PLAN OF CARE
Problem: Chronic Conditions and Co-morbidities  Goal: Patient's chronic conditions and co-morbidity symptoms are monitored and maintained or improved  Outcome: Progressing     Problem: Discharge Planning  Goal: Discharge to home or other facility with appropriate resources  Outcome: Progressing     Problem: Safety - Adult  Goal: Free from fall injury  Outcome: Progressing     Problem: Cardiovascular - Adult  Goal: Maintains optimal cardiac output and hemodynamic stability  Outcome: Progressing  Goal: Absence of cardiac dysrhythmias or at baseline  Outcome: Progressing

## 2024-12-08 NOTE — PROGRESS NOTES
V2.0  [facility] Hospitalist Progress Note      Name:  Maren Meza /Age/Sex: 1955  (69 y.o. female)   MRN & CSN:  [id number] & [id number] Encounter Date/Time: 2024 12:22 PM EST    Location:  [id number] PCP: Marin Cardenas MD       Hospital Day: 2  Subjective:   Chief Complaint: Follow-up chest heaviness and shortness of breath    Patient seen and evaluated at bedside, she states her breathing is better however each time she exerts, she has chest heaviness and left arm heaviness and some numbness.    Review of Systems:    Review of Systems    10 point ROS negative except as stated above in \"subjective\" section    Objective:     Intake/Output Summary (Last 24 hours) at 2024 1222  Last data filed at 2024 1021  Gross per 24 hour   Intake 120 ml   Output 200 ml   Net -80 ml      Vitals:   Vitals:    24 1015   BP: (!) 110/48   Pulse:    Resp:    Temp:    SpO2:      Physical Exam:   General: Awake, alert and oriented, NAD  Cardiovascular: S1S2 present, regular rate and rhythm, no murmurs  Respiratory: Clear to auscultation  Gastrointestinal: Soft, non tender, positive bowel sounds   Genitourinary: no suprapubic tenderness  Musculoskeletal: No edema  Neuro: Alert.    Medications:     Medications:    sodium chloride flush  5-40 mL IntraVENous 2 times per day    enoxaparin  1 mg/kg SubCUTAneous Daily    aspirin  81 mg Oral Daily    atorvastatin  80 mg Oral Nightly    amitriptyline  40 mg Oral Nightly    oyster shell calcium w/D  1 tablet Oral Daily with breakfast    DULoxetine  60 mg Oral Daily    ferrous sulfate  325 mg Oral Daily with breakfast    fluticasone  1 spray Each Nostril Daily    budesonide-formoterol  2 puff Inhalation BID RT    hydrALAZINE  25 mg Oral TID    isosorbide mononitrate  60 mg Oral BID    levETIRAcetam  500 mg Oral BID    [START ON 2024] linaclotide  290 mcg Oral Once per day on     losartan  50 mg Oral QAM AC    metoprolol succinate  50 mg  atrial appendage device well positioned with exclusion of the atrial appendage. 10. Retroperitoneal venous collaterals. These likely connect the chest venous system to the abdominal systemic veins.     CT CHEST WO CONTRAST    1. No evidence of thoracic or abdominal aortic aneurysm or dissection. 2. No evidence of acute pulmonary embolism. 3. No acute cardiopulmonary process. 4. 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 5. Moderate to severe right renal artery disease. 6. Status post cholecystectomy and hysterectomy. 7. Mild diverticulosis but no acute diverticulitis. 8. Mild subpleural fibrosis in the upper lobes. 9. Incidental atrial appendage device well positioned with exclusion of the atrial appendage. 10. Retroperitoneal venous collaterals. These likely connect the chest venous system to the abdominal systemic veins.     XR CHEST PORTABLE    Result Date: 12/7/2024  EXAMINATION: ONE XRAY VIEW OF THE CHEST 12/7/2024 9:20 pm COMPARISON: 12/01/2024 HISTORY: ORDERING SYSTEM PROVIDED HISTORY: chest pain TECHNOLOGIST PROVIDED HISTORY: Reason for exam:->chest pain Reason for Exam: chest pain FINDINGS: Cardiomegaly.  Pulmonary vascular congestion.  Pulmonary edema.  No focal pulmonary consolidation.  No pneumothorax.     Findings suggest congestive heart failure     XR CHEST PORTABLE    Result Date: 12/1/2024  EXAMINATION: ONE XRAY VIEW OF THE CHEST 12/1/2024 5:07 pm COMPARISON: 11/22/2024 HISTORY: ORDERING SYSTEM PROVIDED HISTORY: Chest Pain TECHNOLOGIST PROVIDED HISTORY: Reason for exam:->Chest Pain Reason for Exam: hypertension/sob FINDINGS: The heart is borderline enlarged but unchanged.  The pulmonary vessels are engorged centrally and less prominent.  The lungs are hyperinflated with mild linear densities along the lung bases which is less prominent.  No effusion or consolidation is seen     Stable borderline cardiomegaly with mild central pulmonary congestion or pulmonary artery hypertension

## 2024-12-08 NOTE — PROGRESS NOTES
4 Eyes Skin Assessment     NAME:  Maren Meza  YOB: 1955  MEDICAL RECORD NUMBER:  [id number]    The patient is being assessed for  Admission    I agree that at least one RN has performed a thorough Head to Toe Skin Assessment on the patient. ALL assessment sites listed below have been assessed.      Areas assessed by both nurses:    Head, Face, Ears, Shoulders, Back, Chest, Arms, Elbows, Hands, Sacrum. Buttock, Coccyx, Ischium, Legs. Feet and Heels, and Under Medical Devices         Does the Patient have a Wound? No noted wound(s)       Rakan Prevention initiated by RN: No  Wound Care Orders initiated by RN: No    Pressure Injury (Stage 3,4, Unstageable, DTI, NWPT, and Complex wounds) if present, place Wound referral order by RN under : No    New Ostomies, if present place, Ostomy referral order under : No     Nurse 1 eSignature: Electronically signed by LUIS CHAMBERS RN on 12/8/24 at 12:57 AM EST    **SHARE this note so that the co-signing nurse can place an eSignature**    Nurse 2 eSignature: Electronically signed by Ann Robbins RN on 12/8/24 at 5:30 AM EST

## 2024-12-08 NOTE — PROGRESS NOTES
Arrived to pcu from the ED. Alert and oriented. Placed on tele and verified with cmu. Oriented to room and call light. Stepped up on scale with assist of 1. Reports some dizziness with standing. Assisted back to bed with safety precautions in place. Care ongoing.

## 2024-12-08 NOTE — ED NOTES
Charge RN unsuccessful with vein finder. US qualified RN at bedside attempting an US guided line.

## 2024-12-08 NOTE — PLAN OF CARE
Problem: Chronic Conditions and Co-morbidities  Goal: Patient's chronic conditions and co-morbidity symptoms are monitored and maintained or improved  12/8/2024 1421 by Nadine Amaya RN  Outcome: Progressing     Problem: Discharge Planning  Goal: Discharge to home or other facility with appropriate resources  12/8/2024 1421 by Nadine Amaya RN  Outcome: Progressing     Problem: Safety - Adult  Goal: Free from fall injury  12/8/2024 1421 by Nadine Amaya RN  Outcome: Progressing     Problem: Cardiovascular - Adult  Goal: Maintains optimal cardiac output and hemodynamic stability  12/8/2024 1421 by Nadine Amaya RN  Outcome: Progressing     Problem: Cardiovascular - Adult  Goal: Absence of cardiac dysrhythmias or at baseline  12/8/2024 1421 by Nadine Amaya RN  Outcome: Progressing

## 2024-12-09 ENCOUNTER — APPOINTMENT (OUTPATIENT)
Age: 69
DRG: 291 | End: 2024-12-09
Attending: HOSPITALIST
Payer: COMMERCIAL

## 2024-12-09 LAB
ANION GAP SERPL CALCULATED.3IONS-SCNC: 14 MMOL/L (ref 3–16)
BUN SERPL-MCNC: 34 MG/DL (ref 7–20)
CALCIUM SERPL-MCNC: 9.4 MG/DL (ref 8.3–10.6)
CHLORIDE SERPL-SCNC: 108 MMOL/L (ref 99–110)
CO2 SERPL-SCNC: 19 MMOL/L (ref 21–32)
CREAT SERPL-MCNC: 2.9 MG/DL (ref 0.6–1.2)
CREAT UR-MCNC: 136 MG/DL (ref 28–259)
ECHO AO ROOT DIAM: 2.8 CM
ECHO AO ROOT INDEX: 1.96 CM/M2
ECHO AV AREA PEAK VELOCITY: 1.7 CM2
ECHO AV AREA VTI: 1.7 CM2
ECHO AV AREA/BSA PEAK VELOCITY: 1.2 CM2/M2
ECHO AV AREA/BSA VTI: 1.2 CM2/M2
ECHO AV MEAN GRADIENT: 3 MMHG
ECHO AV MEAN VELOCITY: 0.9 M/S
ECHO AV PEAK GRADIENT: 6 MMHG
ECHO AV PEAK VELOCITY: 1.2 M/S
ECHO AV VELOCITY RATIO: 0.67
ECHO AV VTI: 26 CM
ECHO BSA: 1.43 M2
ECHO IVC EXP: 1.3 CM
ECHO LA AREA 2C: 16.2 CM2
ECHO LA AREA 4C: 16.6 CM2
ECHO LA MAJOR AXIS: 4.7 CM
ECHO LA MINOR AXIS: 4.5 CM
ECHO LA VOL BP: 46 ML (ref 22–52)
ECHO LA VOL MOD A2C: 46 ML (ref 22–52)
ECHO LA VOL MOD A4C: 44 ML (ref 22–52)
ECHO LA VOL/BSA BIPLANE: 32 ML/M2 (ref 16–34)
ECHO LA VOLUME INDEX MOD A2C: 32 ML/M2 (ref 16–34)
ECHO LA VOLUME INDEX MOD A4C: 31 ML/M2 (ref 16–34)
ECHO LV E' LATERAL VELOCITY: 7.9 CM/S
ECHO LV E' SEPTAL VELOCITY: 7.01 CM/S
ECHO LV EF PHYSICIAN: 55 %
ECHO LV FRACTIONAL SHORTENING: 37 % (ref 28–44)
ECHO LV INTERNAL DIMENSION DIASTOLE INDEX: 2.87 CM/M2
ECHO LV INTERNAL DIMENSION DIASTOLIC: 4.1 CM (ref 3.9–5.3)
ECHO LV INTERNAL DIMENSION SYSTOLIC INDEX: 1.82 CM/M2
ECHO LV INTERNAL DIMENSION SYSTOLIC: 2.6 CM
ECHO LV IVSD: 1.2 CM (ref 0.6–0.9)
ECHO LV MASS 2D: 161.4 G (ref 67–162)
ECHO LV MASS INDEX 2D: 112.8 G/M2 (ref 43–95)
ECHO LV POSTERIOR WALL DIASTOLIC: 1.1 CM (ref 0.6–0.9)
ECHO LV RELATIVE WALL THICKNESS RATIO: 0.54
ECHO LVOT AREA: 2.5 CM2
ECHO LVOT AV VTI INDEX: 0.67
ECHO LVOT DIAM: 1.8 CM
ECHO LVOT MEAN GRADIENT: 1 MMHG
ECHO LVOT PEAK GRADIENT: 2 MMHG
ECHO LVOT PEAK VELOCITY: 0.8 M/S
ECHO LVOT STROKE VOLUME INDEX: 30.8 ML/M2
ECHO LVOT SV: 44 ML
ECHO LVOT VTI: 17.3 CM
ECHO MV A VELOCITY: 0.86 M/S
ECHO MV AREA VTI: 1.5 CM2
ECHO MV E DECELERATION TIME (DT): 261 MS
ECHO MV E VELOCITY: 0.87 M/S
ECHO MV E/A RATIO: 1.01
ECHO MV E/E' LATERAL: 11.01
ECHO MV E/E' RATIO (AVERAGED): 11.71
ECHO MV E/E' SEPTAL: 12.41
ECHO MV LVOT VTI INDEX: 1.71
ECHO MV MAX VELOCITY: 0.9 M/S
ECHO MV MEAN GRADIENT: 1 MMHG
ECHO MV MEAN VELOCITY: 0.5 M/S
ECHO MV PEAK GRADIENT: 3 MMHG
ECHO MV VTI: 29.6 CM
ECHO PV MAX VELOCITY: 0.6 M/S
ECHO PV PEAK GRADIENT: 2 MMHG
ECHO RA AREA 4C: 9.1 CM2
ECHO RA END SYSTOLIC VOLUME APICAL 4 CHAMBER INDEX BSA: 12 ML/M2
ECHO RA VOLUME: 17 ML
ECHO RV BASAL DIMENSION: 3.1 CM
ECHO RV FREE WALL PEAK S': 11.1 CM/S
ECHO RV MID DIMENSION: 2.3 CM
ECHO RV TAPSE: 1.9 CM (ref 1.7–?)
EST. AVERAGE GLUCOSE BLD GHB EST-MCNC: 240.3 MG/DL
GFR SERPLBLD CREATININE-BSD FMLA CKD-EPI: 17 ML/MIN/{1.73_M2}
GLUCOSE BLD-MCNC: 202 MG/DL (ref 70–99)
GLUCOSE BLD-MCNC: 203 MG/DL (ref 70–99)
GLUCOSE BLD-MCNC: 264 MG/DL (ref 70–99)
GLUCOSE SERPL-MCNC: 170 MG/DL (ref 70–99)
HBA1C MFR BLD: 10 %
MAGNESIUM SERPL-MCNC: 2.04 MG/DL (ref 1.8–2.4)
NT-PROBNP SERPL-MCNC: 2817 PG/ML (ref 0–124)
PERFORMED ON: ABNORMAL
POTASSIUM SERPL-SCNC: 3.8 MMOL/L (ref 3.5–5.1)
SODIUM SERPL-SCNC: 141 MMOL/L (ref 136–145)
UUN UR-MCNC: 257 MG/DL (ref 800–1666)

## 2024-12-09 PROCEDURE — 36415 COLL VENOUS BLD VENIPUNCTURE: CPT

## 2024-12-09 PROCEDURE — 2580000003 HC RX 258: Performed by: HOSPITALIST

## 2024-12-09 PROCEDURE — 2580000003 HC RX 258

## 2024-12-09 PROCEDURE — 93306 TTE W/DOPPLER COMPLETE: CPT | Performed by: INTERNAL MEDICINE

## 2024-12-09 PROCEDURE — 93306 TTE W/DOPPLER COMPLETE: CPT

## 2024-12-09 PROCEDURE — 2060000000 HC ICU INTERMEDIATE R&B

## 2024-12-09 PROCEDURE — 80048 BASIC METABOLIC PNL TOTAL CA: CPT

## 2024-12-09 PROCEDURE — 83880 ASSAY OF NATRIURETIC PEPTIDE: CPT

## 2024-12-09 PROCEDURE — 6370000000 HC RX 637 (ALT 250 FOR IP): Performed by: HOSPITALIST

## 2024-12-09 PROCEDURE — 94640 AIRWAY INHALATION TREATMENT: CPT

## 2024-12-09 PROCEDURE — 99233 SBSQ HOSP IP/OBS HIGH 50: CPT | Performed by: INTERNAL MEDICINE

## 2024-12-09 PROCEDURE — 94760 N-INVAS EAR/PLS OXIMETRY 1: CPT

## 2024-12-09 PROCEDURE — 82570 ASSAY OF URINE CREATININE: CPT

## 2024-12-09 PROCEDURE — 6360000002 HC RX W HCPCS: Performed by: HOSPITALIST

## 2024-12-09 PROCEDURE — 84540 ASSAY OF URINE/UREA-N: CPT

## 2024-12-09 PROCEDURE — 83036 HEMOGLOBIN GLYCOSYLATED A1C: CPT

## 2024-12-09 PROCEDURE — 83735 ASSAY OF MAGNESIUM: CPT

## 2024-12-09 PROCEDURE — 2500000003 HC RX 250 WO HCPCS

## 2024-12-09 PROCEDURE — 80176 ASSAY OF LIDOCAINE: CPT

## 2024-12-09 RX ORDER — DIPHENHYDRAMINE HCL 25 MG
25 TABLET ORAL EVERY 6 HOURS PRN
Status: DISCONTINUED | OUTPATIENT
Start: 2024-12-09 | End: 2024-12-10

## 2024-12-09 RX ADMIN — FERROUS SULFATE TAB 325 MG (65 MG ELEMENTAL FE) 325 MG: 325 (65 FE) TAB at 09:16

## 2024-12-09 RX ADMIN — DULOXETINE HYDROCHLORIDE 60 MG: 60 CAPSULE, DELAYED RELEASE ORAL at 09:16

## 2024-12-09 RX ADMIN — ENOXAPARIN SODIUM 50 MG: 100 INJECTION SUBCUTANEOUS at 09:16

## 2024-12-09 RX ADMIN — TICAGRELOR 90 MG: 90 TABLET ORAL at 09:16

## 2024-12-09 RX ADMIN — CYCLOBENZAPRINE 5 MG: 10 TABLET, FILM COATED ORAL at 12:51

## 2024-12-09 RX ADMIN — DIPHENHYDRAMINE HCL 25 MG: 25 TABLET ORAL at 17:28

## 2024-12-09 RX ADMIN — ACETAMINOPHEN 650 MG: 325 TABLET ORAL at 15:27

## 2024-12-09 RX ADMIN — ATORVASTATIN CALCIUM 80 MG: 80 TABLET, FILM COATED ORAL at 21:16

## 2024-12-09 RX ADMIN — Medication 2 PUFF: at 20:23

## 2024-12-09 RX ADMIN — SODIUM CHLORIDE, PRESERVATIVE FREE 10 ML: 5 INJECTION INTRAVENOUS at 21:21

## 2024-12-09 RX ADMIN — HYDRALAZINE HYDROCHLORIDE 25 MG: 25 TABLET ORAL at 15:26

## 2024-12-09 RX ADMIN — ISOSORBIDE MONONITRATE 60 MG: 60 TABLET ORAL at 21:16

## 2024-12-09 RX ADMIN — PANTOPRAZOLE SODIUM 40 MG: 40 TABLET, DELAYED RELEASE ORAL at 05:06

## 2024-12-09 RX ADMIN — TRAMADOL HYDROCHLORIDE 50 MG: 50 TABLET, COATED ORAL at 12:51

## 2024-12-09 RX ADMIN — METOPROLOL SUCCINATE 50 MG: 50 TABLET, EXTENDED RELEASE ORAL at 21:16

## 2024-12-09 RX ADMIN — CYCLOBENZAPRINE 5 MG: 10 TABLET, FILM COATED ORAL at 21:16

## 2024-12-09 RX ADMIN — TICAGRELOR 90 MG: 90 TABLET ORAL at 21:16

## 2024-12-09 RX ADMIN — INSULIN LISPRO 2 UNITS: 100 INJECTION, SOLUTION INTRAVENOUS; SUBCUTANEOUS at 18:39

## 2024-12-09 RX ADMIN — SODIUM BICARBONATE: 84 INJECTION, SOLUTION INTRAVENOUS at 14:29

## 2024-12-09 RX ADMIN — MORPHINE SULFATE 4 MG: 4 INJECTION, SOLUTION INTRAMUSCULAR; INTRAVENOUS at 00:42

## 2024-12-09 RX ADMIN — FLUTICASONE PROPIONATE 1 SPRAY: 50 SPRAY, METERED NASAL at 09:15

## 2024-12-09 RX ADMIN — LEVETIRACETAM 500 MG: 500 TABLET, FILM COATED ORAL at 21:16

## 2024-12-09 RX ADMIN — HYDRALAZINE HYDROCHLORIDE 10 MG: 20 INJECTION INTRAMUSCULAR; INTRAVENOUS at 23:15

## 2024-12-09 RX ADMIN — MONTELUKAST 10 MG: 10 TABLET, FILM COATED ORAL at 09:15

## 2024-12-09 RX ADMIN — HYDRALAZINE HYDROCHLORIDE 25 MG: 25 TABLET ORAL at 21:16

## 2024-12-09 RX ADMIN — PREDNISONE 10 MG: 10 TABLET ORAL at 09:15

## 2024-12-09 RX ADMIN — INSULIN LISPRO 1 UNITS: 100 INJECTION, SOLUTION INTRAVENOUS; SUBCUTANEOUS at 21:15

## 2024-12-09 RX ADMIN — MORPHINE SULFATE 4 MG: 4 INJECTION, SOLUTION INTRAMUSCULAR; INTRAVENOUS at 08:04

## 2024-12-09 RX ADMIN — TRAMADOL HYDROCHLORIDE 50 MG: 50 TABLET, COATED ORAL at 18:51

## 2024-12-09 RX ADMIN — ASPIRIN 81 MG: 81 TABLET, CHEWABLE ORAL at 09:16

## 2024-12-09 RX ADMIN — CALCIUM CARBONATE-VITAMIN D TAB 500 MG-200 UNIT 1 TABLET: 500-200 TAB at 09:16

## 2024-12-09 RX ADMIN — SODIUM CHLORIDE, PRESERVATIVE FREE 10 ML: 5 INJECTION INTRAVENOUS at 08:05

## 2024-12-09 RX ADMIN — HYDROXYZINE HYDROCHLORIDE 25 MG: 25 TABLET ORAL at 15:27

## 2024-12-09 RX ADMIN — RANOLAZINE 1000 MG: 500 TABLET, FILM COATED, EXTENDED RELEASE ORAL at 21:15

## 2024-12-09 RX ADMIN — QUETIAPINE FUMARATE 100 MG: 100 TABLET ORAL at 21:17

## 2024-12-09 RX ADMIN — Medication 2 PUFF: at 07:33

## 2024-12-09 RX ADMIN — LEVETIRACETAM 500 MG: 500 TABLET, FILM COATED ORAL at 09:15

## 2024-12-09 RX ADMIN — METOPROLOL SUCCINATE 50 MG: 50 TABLET, EXTENDED RELEASE ORAL at 09:15

## 2024-12-09 RX ADMIN — HYDRALAZINE HYDROCHLORIDE 25 MG: 25 TABLET ORAL at 09:15

## 2024-12-09 RX ADMIN — RANOLAZINE 1000 MG: 500 TABLET, FILM COATED, EXTENDED RELEASE ORAL at 09:15

## 2024-12-09 RX ADMIN — AMITRIPTYLINE HYDROCHLORIDE 40 MG: 10 TABLET, FILM COATED ORAL at 21:15

## 2024-12-09 RX ADMIN — LOSARTAN POTASSIUM 50 MG: 50 TABLET, FILM COATED ORAL at 05:06

## 2024-12-09 RX ADMIN — MORPHINE SULFATE 4 MG: 4 INJECTION, SOLUTION INTRAMUSCULAR; INTRAVENOUS at 04:41

## 2024-12-09 RX ADMIN — ONDANSETRON 4 MG: 2 INJECTION INTRAMUSCULAR; INTRAVENOUS at 04:47

## 2024-12-09 RX ADMIN — ISOSORBIDE MONONITRATE 60 MG: 60 TABLET ORAL at 09:15

## 2024-12-09 ASSESSMENT — PAIN SCALES - GENERAL
PAINLEVEL_OUTOF10: 10
PAINLEVEL_OUTOF10: 8
PAINLEVEL_OUTOF10: 3
PAINLEVEL_OUTOF10: 7

## 2024-12-09 ASSESSMENT — PAIN DESCRIPTION - DESCRIPTORS
DESCRIPTORS: ACHING
DESCRIPTORS: ACHING;TENDER
DESCRIPTORS: ACHING;DISCOMFORT

## 2024-12-09 ASSESSMENT — PAIN - FUNCTIONAL ASSESSMENT
PAIN_FUNCTIONAL_ASSESSMENT: ACTIVITIES ARE NOT PREVENTED

## 2024-12-09 ASSESSMENT — PAIN DESCRIPTION - ORIENTATION
ORIENTATION: LEFT
ORIENTATION: MID
ORIENTATION: LEFT

## 2024-12-09 ASSESSMENT — PAIN DESCRIPTION - LOCATION
LOCATION: CHEST
LOCATION: BACK
LOCATION: BACK
LOCATION: CHEST;BACK
LOCATION: ARM;CHEST
LOCATION: CHEST
LOCATION: CHEST
LOCATION: HEAD

## 2024-12-09 NOTE — DISCHARGE INSTRUCTIONS
Extra Heart Failure Education/ Tools/ Resources:     https://CaptonitalBringShare.com/publication/?r=559834   --- this is American Heart Association interactive Healthier Living with Heart Failure guidebook.  Please click hyperlink or copy / paste link into search bar. The QR Code is also available below. Use your mouse to scroll through the pages.  Lots of information about weight monitoring, diet tips, activity, meds, etc    Heart Failure Tools and Resources QR Code is below. It includes multiple resources to include symptom tracker, med tracker, further HF info, and access to a HF Support Network online Community    HF [place]  -- this is a free smart phone forrest available for iPhone and Android download.  Use your phone to track sodium / fluid intake, zone tool symptom tracking, weights, medications, etc. Click on this hyperlink  HF [place]   for QR code for easy download or the link is also found in the below HF Tools and Resources.      DASH (Dietary Approach to Stop Hypertension) diet --  https://www.nhlbi.nih.gov/education/dash-eating-plan -- this diet is a flexible eating plan that promotes heart healthy eating style.  Click on hyperlink or copy / paste link into search bar.  Lots of low sodium recipes and tips.    https://www.[employer].Taigen/recipes  -- more free recipes       American Diabetes Association:     About Diabetes: https://diabetes.org/about-diabetes     Life with Diabetes: https://diabetes.org/living-with-diabetes     Health & Wellness: https://diabetes.org/health-wellness     Food & Nutrition: https://diabetes.org/food-nutrition     Tools & Resources: https://diabetes.org/tools-resources

## 2024-12-09 NOTE — CONSULTS
[facility]          [address] [place] [address]                              CONSULTATION      PATIENT NAME: ARISTEO HAM               : 1955  MED REC NO: [id number]                      ROOM: [place] [address]  ACCOUNT NO: [id number]                       ADMIT DATE: 2024  PROVIDER: Corazon Hanson MD      CONSULT DATE: 2024    REASON FOR CONSULTATION:  Acute kidney injury.    HISTORY OF PRESENTING ILLNESS:  She is a 69-year-old Afro-American female with past medical history significant for coronary artery disease, congestive heart failure, hypertension, chronic kidney disease stage 4, hypercholesterolemia, diabetes mellitus type 2, came to ER complaining of chest pain, shortness of breath, and dyspnea on exertion.  Admitted for further workup and management.  Complaining of some weight gain of 3 pounds over a week.  At the time of presentation, found to be hypertensive and initiated on her home blood pressure medication.    At the time of consultation, the patient is feeling and breathing better, denies any chest pain.  Admits improvement in shortness breath and dyspnea on exertion.  Feeling weak, tired, decreased level of energy.  Denies any urinary symptoms.    PAST MEDICAL HISTORY:    1. Anxiety/depression.  2. TIA.  3. Congestive heart failure with preserved ejection fraction.  4. Chronic kidney disease stage 4, follows up with Dr. Chung.  5. COPD.  6. Diabetes mellitus type 2.  7. Fibromyalgia.  8. Irritable bowel syndrome.  9. Temporal arteritis, details not available.    PAST SURGICAL HISTORY:    1. Status post knee replacement.  2. Status post cholecystectomy.  3. Status post cardiac ablation.  4. Status post breast biopsy.  5. Status post coronary stent placement.  6. Status post cystoscopy.    FAMILY HISTORY:  Significant for diabetes and hypertension.  No history of kidney disease in the family.    SOCIAL HISTORY:  Does not smoke or  MD VALENTÍN      D:  12/09/2024 12:11:33     T:  12/09/2024 16:00:40     KEYUR/ELIAS  Job #:  [id number]     Doc#:  [id number]

## 2024-12-09 NOTE — PLAN OF CARE
Problem: Chronic Conditions and Co-morbidities  Goal: Patient's chronic conditions and co-morbidity symptoms are monitored and maintained or improved  12/8/2024 2247 by Keny Adams RN  Outcome: Progressing     Problem: Discharge Planning  Goal: Discharge to home or other facility with appropriate resources  12/8/2024 2247 by Keny Adams RN  Outcome: Progressing     Problem: Safety - Adult  Goal: Free from fall injury  12/8/2024 2247 by Keny Adams RN  Outcome: Progressing     Problem: Cardiovascular - Adult  Goal: Maintains optimal cardiac output and hemodynamic stability  12/8/2024 2247 by Keny Adams RN  Outcome: Progressing     Problem: Cardiovascular - Adult  Goal: Absence of cardiac dysrhythmias or at baseline  12/8/2024 2247 by Keny Adams RN  Outcome: Progressing     Problem: Pain  Goal: Verbalizes/displays adequate comfort level or baseline comfort level  Outcome: Progressing     Problem: ABCDS Injury Assessment  Goal: Absence of physical injury  Outcome: Progressing

## 2024-12-09 NOTE — CONSULTS
Nephrology Consult Note   [employer]      Reason for consultation: KOLBY on CKD 3b/4 -- recent baseline ~ 1.7-2.0 mg/dL since 6/2024. Cr baseline ~ 1.5 mg/dL prior to this. Supposed to follow with Dr. Chung in office (last seen 11/2023).     History of Present Illness: Maren Meza is a 70 yo female with a PMHx of CKD 3b/4, h/o KOLBY, HTN, afib s/p watchman, CAD, CHF, COPD, DM2, HLD. Patient presents to  ED on 12/7/2024 with complaints of chest pain and SOB. Troponins 54>46. CTA Chest abdomen is negative for acute findings. Imaging does reveal moderate to severe R renal artery disease. Patient is supposed to have an appt with vascular surgery tomorrow. Cardiology saw pt this admission and is going discuss with Dr. Allred.    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 Baseline Cr has more recently been ~ 1.7-2.0 mg/dL. Cr upon admission was 2.2 mg/dL. Today, Cr is 2.9 mg/dL. Received IV contrast on 12/7/2024. Taking losartan (received doses 12/8 and 12/9). Received 40 mg IVP lasix on 12/8. Denies N/V/D. Endorses some urinary hesitancy. Denies NSAID use.     Subjective:      Patient seen and examined. Labs and chart reviewed. Resting in bed on RA.     There were not complications last night.    Patient review of systems: Improved chest pain and SOB. See HPI for other pertinent ROS.     Past Medical History:   Diagnosis Date    Acid reflux     Anemia     Anxiety and depression     Arthritis     Asthma     Atrial fibrillation (HCC)     CAD (coronary artery disease) 12/3/2012    Cerebral artery occlusion with cerebral infarction (HCC)     TIA\"\"S--right sided weakness & headache    CHF (congestive heart failure) (HCC)     Chronic kidney disease--stage III     40% kidney function    COPD (chronic obstructive pulmonary disease) (HCC)     DM2 (diabetes mellitus, type 2) ([facility])     Dysarthria     Fibromyalgia 6/7/2016    Headache(784.0) 2/19/2013    Hemisensory loss     History of blood transfusion 11/2020    pt  Year: Sometimes true   Physical Activity: Insufficiently Active (5/25/2022)    Exercise Vital Sign     Days of Exercise per Week: 7 days     Minutes of Exercise per Session: 10 min   Stress: Not on File (8/24/2019)    Received from [facility]    Stress     Stress: 0   Social Connections: Not on File (8/24/2019)    Received from [facility]    Social Connections     Social Connections and Isolation: 0   Housing Stability: High Risk (12/8/2024)    Housing Stability Vital Sign     Unable to Pay for Housing in the Last Year: Yes     Number of Times Moved in the Last Year: 0     Homeless in the Last Year: No        Scheduled Meds:   sodium chloride flush  5-40 mL IntraVENous 2 times per day    enoxaparin  1 mg/kg SubCUTAneous Daily    aspirin  81 mg Oral Daily    atorvastatin  80 mg Oral Nightly    amitriptyline  40 mg Oral Nightly    oyster shell calcium w/D  1 tablet Oral Daily with breakfast    DULoxetine  60 mg Oral Daily    ferrous sulfate  325 mg Oral Daily with breakfast    fluticasone  1 spray Each Nostril Daily    budesonide-formoterol  2 puff Inhalation BID RT    hydrALAZINE  25 mg Oral TID    isosorbide mononitrate  60 mg Oral BID    levETIRAcetam  500 mg Oral BID    linaclotide  290 mcg Oral Once per day on Monday Thursday    losartan  50 mg Oral QAM AC    metoprolol succinate  50 mg Oral BID    montelukast  10 mg Oral Daily    pantoprazole  40 mg Oral QAM AC    predniSONE  10 mg Oral Daily    QUEtiapine  100 mg Oral Nightly    ranolazine  1,000 mg Oral BID    ticagrelor  90 mg Oral BID    insulin lispro  0-4 Units SubCUTAneous 4x Daily AC & HS        sodium chloride      dextrose      dextrose         PRN Meds:sodium chloride flush, sodium chloride, ondansetron **OR** ondansetron, polyethylene glycol, acetaminophen **OR** [DISCONTINUED] acetaminophen, perflutren lipid microspheres, cyclobenzaprine, hydrOXYzine HCl, traMADol, glucose, dextrose bolus **OR** dextrose bolus, glucagon (rDNA), dextrose,  Saleem, CYRUS - CNP

## 2024-12-09 NOTE — PROGRESS NOTES
Department of Internal Medicine  Nephrology Progress Note        Reason for consultation: KOLBY      History of Present Illness: Maren Meza is a 68 yo female with a PMHx of CKD 3b/4, h/o KOLBY, HTN, afib s/p watchman, CAD, CHF, COPD, DM2, HLD. Patient presents to  ED on 12/7/2024 with complaints of chest pain and SOB. Troponins 54>46. CTA Chest abdomen is negative for acute findings. Imaging does reveal moderate to severe R renal artery disease. Patient is supposed to have an appt with vascular surgery tomorrow. Cardiology saw pt this admission and is going discuss with Dr. Allred.           Feeling better   REVIEW OF SYSTEMS:  Improved SOB/KELLER ,S till has CP?     Physical Exam:    VITALS:  BP (!) 155/87   Pulse 61   Temp 97.5 °F (36.4 °C) (Oral)   Resp 18   Ht 1.524 m (5')   Wt 48.5 kg (107 lb)   SpO2 98%   BMI 20.90 kg/m²   24HR INTAKE/OUTPUT:    Intake/Output Summary (Last 24 hours) at 12/9/2024 1527  Last data filed at 12/9/2024 0958  Gross per 24 hour   Intake 600 ml   Output 200 ml   Net 400 ml       Constitutional: Looks comfortable   Respiratory:  CTA  Gastrointestinal:  No  tenderness.  Normal Bowel Sounds  Cardiovascular:  S1, S2 Irregular   Edema:   +  edema    DATA:    CBC:  Lab Results   Component Value Date/Time    WBC 4.5 12/07/2024 08:25 PM    RBC 4.06 12/07/2024 08:25 PM    HGB 12.6 12/07/2024 08:25 PM    HCT 39.1 12/07/2024 08:25 PM    MCV 96.3 12/07/2024 08:25 PM    MCH 31.0 12/07/2024 08:25 PM    MCHC 32.2 12/07/2024 08:25 PM    RDW 15.1 12/07/2024 08:25 PM     12/07/2024 08:25 PM    MPV 8.6 12/07/2024 08:25 PM     CMP:  Lab Results   Component Value Date/Time     12/09/2024 05:39 AM    K 3.8 12/09/2024 05:39 AM    K 3.8 12/07/2024 08:25 PM     12/09/2024 05:39 AM    CO2 19 12/09/2024 05:39 AM    BUN 34 12/09/2024 05:39 AM    CREATININE 2.9 12/09/2024 05:39 AM    GFRAA 46 10/09/2022 06:18 AM    GFRAA 60 05/23/2013 02:56 PM    AGRATIO 1.1 12/07/2024 08:25 PM    LABGLOM    4- R renal artery stenosis  Hypertension              - Pt supposed to have appt with vascular surgery tomorrow              - Cardiology following here -- case being discussed with Dr. Allred per note              - Hold losartan in the setting of KOLBY              - May increase hydralazine if needed              - Monitor     5- pAF s/p watchman     6- CKD-BMD              - check phos /pth     Corazon Hanson MD, FACP

## 2024-12-09 NOTE — PLAN OF CARE
Problem: Chronic Conditions and Co-morbidities  Goal: Patient's chronic conditions and co-morbidity symptoms are monitored and maintained or improved  12/9/2024 1055 by Kali Sanchez RN  Outcome: Progressing     Problem: Discharge Planning  Goal: Discharge to home or other facility with appropriate resources  12/9/2024 1055 by Kali Sacnhez RN  Outcome: Progressing     Problem: Safety - Adult  Goal: Free from fall injury  12/9/2024 1055 by Kali Sanchez RN  Outcome: Progressing     Problem: Cardiovascular - Adult  Goal: Maintains optimal cardiac output and hemodynamic stability  12/9/2024 1055 by Kali Sanchez RN  Outcome: Progressing     Problem: Cardiovascular - Adult  Goal: Absence of cardiac dysrhythmias or at baseline  12/9/2024 1055 by Kali Sanchez RN  Outcome: Progressing     Problem: Pain  Goal: Verbalizes/displays adequate comfort level or baseline comfort level  12/9/2024 1055 by Kali Sanchez RN  Outcome: Progressing     Problem: ABCDS Injury Assessment  Goal: Absence of physical injury  12/9/2024 1055 by Kali Sanchez RN  Outcome: Progressing

## 2024-12-09 NOTE — PROGRESS NOTES
[facility]  Inpatient Progress Note    CC:         Chest pain and shortness of breath          HPI:   This is a 69 y.o. female who I have known for close to 30 years with head innumerable admissions for chest pains, recurrent revascularization for coronary disease with multiple stents and now most recently with diastolic heart failure came to the ER for chest pain exertional fatigue.  She claims to be compliant with her medications.  Patient was seen by Dr. Riddle yesterday who documented that the patient's admission weight was 120 pounds when her dry weight is 110 pounds.  She is now on IV Lasix.          Review of Systems -   Constitutional: Negative for weight gain/loss; malaise, fever  Respiratory: Negative for Asthma;  cough and hemoptysis  Cardiovascular: Negative for palpitations,dizziness   Gastrointestinal: Negative for abd.pain; constipation/diarrhea;    Genitourinary: Negative for stones; hematuria; frequency hesitancy  Integumentt: Negative for rash or pruritis  Hematologic/lymphatic: Negative for blood dyscrasia; leukemia/lymphoma  Musculoskeletal: Negative for Connective tissue disease  Neurological:  Negative for Seizure   Behavioral/Psych:Negative for Bipolar disorder, Schizophrenia; Dementia  Endocrine: negative for thyroid, parathyroid disease      Intake/Output Summary (Last 24 hours) at 12/9/2024 0741  Last data filed at 12/9/2024 0500  Gross per 24 hour   Intake 300 ml   Output 400 ml   Net -100 ml         Physical Examination:    BP (!) 155/87   Pulse 61   Temp 97.5 °F (36.4 °C) (Oral)   Resp 19   Ht 1.524 m (5')   Wt 48.7 kg (107 lb 5.8 oz)   SpO2 98%   BMI 20.97 kg/m²   HEENT:  Face: Atraumatic, Conjunctiva: Pink; non icteric,  Mucous Memb:  Moist, No thyromegaly or Lymphadenopathy  Respiratory:  Resp Assessment: Normal, Resp Auscultation: clear   Cardiovascular:  Auscultation: nl S1 & S2, Palpation:  Nl PMI; No heaves or thrills, JVP:  normal  Abdomen: Soft, non-tender,  process.  4. 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  5. Moderate to severe right renal artery disease.  6. Status post cholecystectomy and hysterectomy.  7. Mild diverticulosis but no acute diverticulitis.  8. Mild subpleural fibrosis in the upper lobes.  9. Incidental atrial appendage device well positioned with exclusion of theatrial appendage.  10. Retroperitoneal venous collaterals. These likely connect the chest venous system to the abdominal systemic vein              ASSESSMENT AND PLAN:        Chest Pain  I am one of the few doctors who have known and have been taking care of the patient for 25 to 30 years  She has had 100s of admissions for chest pains with mixed workup involving equal number of invasive and noninvasive studies resulting in multiple stents  Some of these procedures were never needed but had to be done because of her  literally every second week admission to the hospital [place]  Unfortunately it has always been very tough because of the frequency with which she comes to the ER  This admission also she has no obvious ST depressions  Troponins are mildly elevated and flat  She has received so many angiograms that now she is developing renal failure  Echo done today shows normal LV size and wall motion as well as function  I would recommend treating her with isosorbide and Ranexa and better      Diastolic heart failure  NYHA class IV  LVEF 50%  Admission weight 110 pounds    today's weight 107 pounds  Received IV Lasix now creatinine has bumped to 2.9  proBNP 2817        Hypertension  155/87  Moderate to severe right renal artery stenosis  Patient on hydralazine losartan Toprol Ranexa and Imdur    CKD  Nephrology on    JULIA Cardenas M.D  12/9/2024

## 2024-12-09 NOTE — PROGRESS NOTES
V2.0  [facility] Hospitalist Progress Note      Name:  Maren Meza /Age/Sex: 1955  (69 y.o. female)   MRN & CSN:  [id number] & [id number] Encounter Date/Time: 2024 1:48 PM EST    Location:  [id number] PCP: Marin Cardenas MD       Hospital Day: 3  Subjective:   Chief Complaint: Follow-up back pain    Patient needing a lot of pain medications, has been requesting morphine for a lower mid back pain, not significant chest pain, on room air, creatinine is gone up    Review of Systems:    Review of Systems    10 point ROS negative except as stated above in \"subjective\" section    Objective:     Intake/Output Summary (Last 24 hours) at 2024 1348  Last data filed at 2024 0958  Gross per 24 hour   Intake 600 ml   Output 200 ml   Net 400 ml      Vitals:   Vitals:    24 1251   BP:    Pulse:    Resp: 18   Temp:    SpO2:      Physical Exam:   General: Awake, alert and oriented, NAD  Cardiovascular: S1S2 present, regular rate and rhythm, no murmurs  Respiratory: Clear to auscultation  Gastrointestinal: Soft, non tender, positive bowel sounds   Genitourinary: no suprapubic tenderness  Musculoskeletal: No edema  Back: On her midline lower back, patient is noted to have a mobile subcutaneous nodule which could be a lipoma/fibroma or a cyst which is tender  Neuro: Alert.    Medications:     Medications:    sodium chloride flush  5-40 mL IntraVENous 2 times per day    enoxaparin  1 mg/kg SubCUTAneous Daily    aspirin  81 mg Oral Daily    atorvastatin  80 mg Oral Nightly    amitriptyline  40 mg Oral Nightly    oyster shell calcium w/D  1 tablet Oral Daily with breakfast    DULoxetine  60 mg Oral Daily    ferrous sulfate  325 mg Oral Daily with breakfast    fluticasone  1 spray Each Nostril Daily    budesonide-formoterol  2 puff Inhalation BID RT    hydrALAZINE  25 mg Oral TID    isosorbide mononitrate  60 mg Oral BID    levETIRAcetam  500 mg Oral BID    linaclotide  290 mcg Oral Once per day on  Monday Thursday    metoprolol succinate  50 mg Oral BID    montelukast  10 mg Oral Daily    pantoprazole  40 mg Oral QAM AC    predniSONE  10 mg Oral Daily    QUEtiapine  100 mg Oral Nightly    ranolazine  1,000 mg Oral BID    ticagrelor  90 mg Oral BID    insulin lispro  0-4 Units SubCUTAneous 4x Daily AC & HS      Infusions:    sodium bicarbonate 75 mEq in sodium chloride 0.45 % 1,000 mL infusion      sodium chloride      dextrose      dextrose       PRN Meds: sodium chloride flush, 5-40 mL, PRN  sodium chloride, , PRN  ondansetron, 4 mg, Q8H PRN   Or  ondansetron, 4 mg, Q6H PRN  polyethylene glycol, 17 g, Daily PRN  acetaminophen, 650 mg, Q6H PRN  perflutren lipid microspheres, 1.5 mL, ONCE PRN  cyclobenzaprine, 5 mg, TID PRN  hydrOXYzine HCl, 25 mg, TID PRN  traMADol, 50 mg, Q6H PRN  glucose, 4 tablet, PRN  dextrose bolus, 125 mL, PRN   Or  dextrose bolus, 250 mL, PRN  glucagon (rDNA), 1 mg, PRN  dextrose, , Continuous PRN  dextrose, , Continuous PRN  hydrALAZINE, 10 mg, Q6H PRN        Labs      Recent Results (from the past 24 hour(s))   POCT Glucose    Collection Time: 12/08/24  5:34 PM   Result Value Ref Range    POC Glucose 256 (H) 70 - 99 mg/dl    Performed on ACCU-CHEK    POCT Glucose    Collection Time: 12/08/24  8:52 PM   Result Value Ref Range    POC Glucose 241 (H) 70 - 99 mg/dl    Performed on ACCU-CHEK    Basic Metabolic Panel    Collection Time: 12/09/24  5:39 AM   Result Value Ref Range    Sodium 141 136 - 145 mmol/L    Potassium 3.8 3.5 - 5.1 mmol/L    Chloride 108 99 - 110 mmol/L    CO2 19 (L) 21 - 32 mmol/L    Anion Gap 14 3 - 16    Glucose 170 (H) 70 - 99 mg/dL    BUN 34 (H) 7 - 20 mg/dL    Creatinine 2.9 (H) 0.6 - 1.2 mg/dL    Est, Glom Filt Rate 17 (A) >60    Calcium 9.4 8.3 - 10.6 mg/dL   Magnesium    Collection Time: 12/09/24  5:39 AM   Result Value Ref Range    Magnesium 2.04 1.80 - 2.40 mg/dL   Brain Natriuretic Peptide    Collection Time: 12/09/24  5:39 AM   Result Value Ref Range    NT  abdominal systemic veins.     XR CHEST PORTABLE      Findings suggest congestive heart failure       Assessment and Plan:   Maren Meza is a 69 y.o. female     Conditions under treatment:    # Accelerated hypertension  # Paroxysmal atrial fibrillation  # Chest pain  # Elevated troponin  # Acute on chronic diastolic congestive heart failure  # Type 2 diabetes mellitus  # CKD stage IV  # Renal artery stenosis     Plan:     -At present patient patient had evidence of mild heart failure, diuresed, currently diuretics have been discontinued, creatinine is going up, no further chest pain, had a cath earlier this year as outlined by cardiology, no further interventions from their standpoint, EKG is negative, troponin was elevated, could be demand related     -Patient has known CKD stage IV, creatinine is gone up, she is acidotic, maintaining urine output, this could be related to the diuretics with nephrology consulted, patient to be started on bicarb drip, further recommendations per nephrology, patient maintaining urine output     -Patient noted to have renal artery stenosis, that may be why she has difficult to control hypertension, she has vascular surgery appointment as outpatient in the coming weeks, cardiology was planning to discuss with Dr. Rain, will follow recommendations     -Blood pressure is better overall but still not optimally controlled     -Continue current care for diabetes     -Rhythm is sinus currently, she has a Watchman device in place    -Patient is noted to have a small tender nodule in the midline lower back, it is subcutaneous, will discontinue IV morphine, will add topical lidocaine    Personally reviewed Lab Studies and Imaging      Telemetry strip interpreted personally and results reveal sinus rhythm     Imaging that was interpreted personally includes CT chest and results do not reveal any pneumonia    Electronically signed by Martha Lopes MD on 12/9/2024 at 1:48 PM    This not was  generated completely or in part using Dragon, speech recognition software, please excuse any inaccuracies or misstatements.

## 2024-12-10 LAB
ALBUMIN SERPL-MCNC: 3.7 G/DL (ref 3.4–5)
ANION GAP SERPL CALCULATED.3IONS-SCNC: 12 MMOL/L (ref 3–16)
BACTERIA URNS QL MICRO: ABNORMAL /HPF
BASE EXCESS BLDV CALC-SCNC: -1.7 MMOL/L
BILIRUB UR QL STRIP.AUTO: NEGATIVE
BUN SERPL-MCNC: 36 MG/DL (ref 7–20)
CALCIUM SERPL-MCNC: 9.4 MG/DL (ref 8.3–10.6)
CHLORIDE SERPL-SCNC: 103 MMOL/L (ref 99–110)
CLARITY UR: ABNORMAL
CO2 BLDV-SCNC: 25 MMOL/L
CO2 SERPL-SCNC: 22 MMOL/L (ref 21–32)
COHGB MFR BLDV: 1.4 %
COLOR UR: YELLOW
CREAT SERPL-MCNC: 2.9 MG/DL (ref 0.6–1.2)
DEPRECATED RDW RBC AUTO: 14.7 % (ref 12.4–15.4)
EPI CELLS #/AREA URNS AUTO: 4 /HPF (ref 0–5)
GFR SERPLBLD CREATININE-BSD FMLA CKD-EPI: 17 ML/MIN/{1.73_M2}
GLUCOSE BLD-MCNC: 175 MG/DL (ref 70–99)
GLUCOSE BLD-MCNC: 177 MG/DL (ref 70–99)
GLUCOSE BLD-MCNC: 278 MG/DL (ref 70–99)
GLUCOSE BLD-MCNC: 299 MG/DL (ref 70–99)
GLUCOSE SERPL-MCNC: 148 MG/DL (ref 70–99)
GLUCOSE UR STRIP.AUTO-MCNC: NEGATIVE MG/DL
HCO3 BLDV-SCNC: 23 MMOL/L (ref 23–29)
HCT VFR BLD AUTO: 29 % (ref 36–48)
HGB BLD-MCNC: 9.6 G/DL (ref 12–16)
HGB UR QL STRIP.AUTO: ABNORMAL
HYALINE CASTS #/AREA URNS AUTO: 4 /LPF (ref 0–8)
HYALINE CASTS: PRESENT
KETONES UR STRIP.AUTO-MCNC: ABNORMAL MG/DL
LEUKOCYTE ESTERASE UR QL STRIP.AUTO: ABNORMAL
LIDOCAIN SERPL-MCNC: <0.5 UG/ML (ref 1.2–5)
MAGNESIUM SERPL-MCNC: 2.08 MG/DL (ref 1.8–2.4)
MCH RBC QN AUTO: 31.4 PG (ref 26–34)
MCHC RBC AUTO-ENTMCNC: 33.2 G/DL (ref 31–36)
MCV RBC AUTO: 94.6 FL (ref 80–100)
METHGB MFR BLDV: 0.3 %
NITRITE UR QL STRIP.AUTO: NEGATIVE
O2 THERAPY: ABNORMAL
PCO2 BLDV: 39.7 MMHG (ref 40–50)
PERFORMED ON: ABNORMAL
PH BLDV: 7.38 [PH] (ref 7.35–7.45)
PH UR STRIP.AUTO: 5 [PH] (ref 5–8)
PHOSPHATE SERPL-MCNC: 3.4 MG/DL (ref 2.5–4.9)
PLATELET # BLD AUTO: 184 K/UL (ref 135–450)
PMV BLD AUTO: 8.4 FL (ref 5–10.5)
PO2 BLDV: 41 MMHG
POTASSIUM SERPL-SCNC: 3.9 MMOL/L (ref 3.5–5.1)
PROT UR STRIP.AUTO-MCNC: 100 MG/DL
RBC # BLD AUTO: 3.06 M/UL (ref 4–5.2)
RBC CLUMPS #/AREA URNS AUTO: 636 /HPF (ref 0–4)
SAO2 % BLDV: 71 %
SODIUM SERPL-SCNC: 137 MMOL/L (ref 136–145)
SP GR UR STRIP.AUTO: 1.02 (ref 1–1.03)
UA COMPLETE W REFLEX CULTURE PNL UR: YES
UA DIPSTICK W REFLEX MICRO PNL UR: YES
URN SPEC COLLECT METH UR: ABNORMAL
UROBILINOGEN UR STRIP-ACNC: 1 E.U./DL
WBC # BLD AUTO: 7 K/UL (ref 4–11)
WBC #/AREA URNS AUTO: 38 /HPF (ref 0–5)

## 2024-12-10 PROCEDURE — 82803 BLOOD GASES ANY COMBINATION: CPT

## 2024-12-10 PROCEDURE — 6370000000 HC RX 637 (ALT 250 FOR IP): Performed by: HOSPITALIST

## 2024-12-10 PROCEDURE — 94640 AIRWAY INHALATION TREATMENT: CPT

## 2024-12-10 PROCEDURE — 6370000000 HC RX 637 (ALT 250 FOR IP): Performed by: INTERNAL MEDICINE

## 2024-12-10 PROCEDURE — 81001 URINALYSIS AUTO W/SCOPE: CPT

## 2024-12-10 PROCEDURE — 87086 URINE CULTURE/COLONY COUNT: CPT

## 2024-12-10 PROCEDURE — 2060000000 HC ICU INTERMEDIATE R&B

## 2024-12-10 PROCEDURE — 2580000003 HC RX 258

## 2024-12-10 PROCEDURE — 85027 COMPLETE CBC AUTOMATED: CPT

## 2024-12-10 PROCEDURE — 2580000003 HC RX 258: Performed by: HOSPITALIST

## 2024-12-10 PROCEDURE — 99233 SBSQ HOSP IP/OBS HIGH 50: CPT | Performed by: INTERNAL MEDICINE

## 2024-12-10 PROCEDURE — 6360000002 HC RX W HCPCS: Performed by: HOSPITALIST

## 2024-12-10 PROCEDURE — 2500000003 HC RX 250 WO HCPCS

## 2024-12-10 PROCEDURE — 83735 ASSAY OF MAGNESIUM: CPT

## 2024-12-10 PROCEDURE — 94760 N-INVAS EAR/PLS OXIMETRY 1: CPT

## 2024-12-10 PROCEDURE — 80069 RENAL FUNCTION PANEL: CPT

## 2024-12-10 PROCEDURE — 36415 COLL VENOUS BLD VENIPUNCTURE: CPT

## 2024-12-10 RX ORDER — HEPARIN SODIUM 5000 [USP'U]/ML
5000 INJECTION, SOLUTION INTRAVENOUS; SUBCUTANEOUS 2 TIMES DAILY
Status: DISCONTINUED | OUTPATIENT
Start: 2024-12-11 | End: 2024-12-16 | Stop reason: HOSPADM

## 2024-12-10 RX ORDER — HYDRALAZINE HYDROCHLORIDE 50 MG/1
100 TABLET, FILM COATED ORAL 3 TIMES DAILY
Status: DISCONTINUED | OUTPATIENT
Start: 2024-12-10 | End: 2024-12-16 | Stop reason: HOSPADM

## 2024-12-10 RX ORDER — AMLODIPINE BESYLATE 5 MG/1
5 TABLET ORAL DAILY
Status: DISCONTINUED | OUTPATIENT
Start: 2024-12-10 | End: 2024-12-15

## 2024-12-10 RX ADMIN — TICAGRELOR 90 MG: 90 TABLET ORAL at 08:38

## 2024-12-10 RX ADMIN — RANOLAZINE 1000 MG: 500 TABLET, FILM COATED, EXTENDED RELEASE ORAL at 08:38

## 2024-12-10 RX ADMIN — LEVETIRACETAM 500 MG: 500 TABLET, FILM COATED ORAL at 08:38

## 2024-12-10 RX ADMIN — DULOXETINE HYDROCHLORIDE 60 MG: 60 CAPSULE, DELAYED RELEASE ORAL at 08:38

## 2024-12-10 RX ADMIN — SODIUM BICARBONATE: 84 INJECTION, SOLUTION INTRAVENOUS at 10:30

## 2024-12-10 RX ADMIN — INSULIN LISPRO 2 UNITS: 100 INJECTION, SOLUTION INTRAVENOUS; SUBCUTANEOUS at 21:35

## 2024-12-10 RX ADMIN — METOPROLOL SUCCINATE 50 MG: 50 TABLET, EXTENDED RELEASE ORAL at 21:34

## 2024-12-10 RX ADMIN — CYCLOBENZAPRINE 5 MG: 10 TABLET, FILM COATED ORAL at 08:38

## 2024-12-10 RX ADMIN — PREDNISONE 10 MG: 10 TABLET ORAL at 08:38

## 2024-12-10 RX ADMIN — FERROUS SULFATE TAB 325 MG (65 MG ELEMENTAL FE) 325 MG: 325 (65 FE) TAB at 08:38

## 2024-12-10 RX ADMIN — HYDRALAZINE HYDROCHLORIDE 10 MG: 20 INJECTION INTRAMUSCULAR; INTRAVENOUS at 05:41

## 2024-12-10 RX ADMIN — Medication 2 PUFF: at 20:30

## 2024-12-10 RX ADMIN — PANTOPRAZOLE SODIUM 40 MG: 40 TABLET, DELAYED RELEASE ORAL at 05:40

## 2024-12-10 RX ADMIN — FLUTICASONE PROPIONATE 1 SPRAY: 50 SPRAY, METERED NASAL at 08:42

## 2024-12-10 RX ADMIN — INSULIN LISPRO 2 UNITS: 100 INJECTION, SOLUTION INTRAVENOUS; SUBCUTANEOUS at 19:01

## 2024-12-10 RX ADMIN — ATORVASTATIN CALCIUM 80 MG: 80 TABLET, FILM COATED ORAL at 21:34

## 2024-12-10 RX ADMIN — RANOLAZINE 1000 MG: 500 TABLET, FILM COATED, EXTENDED RELEASE ORAL at 21:34

## 2024-12-10 RX ADMIN — SODIUM CHLORIDE, PRESERVATIVE FREE 10 ML: 5 INJECTION INTRAVENOUS at 21:39

## 2024-12-10 RX ADMIN — METOPROLOL SUCCINATE 50 MG: 50 TABLET, EXTENDED RELEASE ORAL at 08:37

## 2024-12-10 RX ADMIN — ISOSORBIDE MONONITRATE 60 MG: 60 TABLET ORAL at 21:34

## 2024-12-10 RX ADMIN — ENOXAPARIN SODIUM 50 MG: 100 INJECTION SUBCUTANEOUS at 08:39

## 2024-12-10 RX ADMIN — LEVETIRACETAM 500 MG: 500 TABLET, FILM COATED ORAL at 21:34

## 2024-12-10 RX ADMIN — CALCIUM CARBONATE-VITAMIN D TAB 500 MG-200 UNIT 1 TABLET: 500-200 TAB at 08:38

## 2024-12-10 RX ADMIN — ISOSORBIDE MONONITRATE 60 MG: 60 TABLET ORAL at 08:38

## 2024-12-10 RX ADMIN — Medication 2 PUFF: at 08:41

## 2024-12-10 RX ADMIN — DIPHENHYDRAMINE HCL 25 MG: 25 TABLET ORAL at 03:22

## 2024-12-10 RX ADMIN — TICAGRELOR 90 MG: 90 TABLET ORAL at 21:34

## 2024-12-10 RX ADMIN — HYDRALAZINE HYDROCHLORIDE 100 MG: 50 TABLET ORAL at 21:34

## 2024-12-10 RX ADMIN — HYDRALAZINE HYDROCHLORIDE 100 MG: 50 TABLET ORAL at 16:32

## 2024-12-10 RX ADMIN — ASPIRIN 81 MG: 81 TABLET, CHEWABLE ORAL at 08:38

## 2024-12-10 RX ADMIN — HYDRALAZINE HYDROCHLORIDE 25 MG: 25 TABLET ORAL at 08:38

## 2024-12-10 RX ADMIN — AMLODIPINE BESYLATE 5 MG: 5 TABLET ORAL at 12:35

## 2024-12-10 RX ADMIN — MONTELUKAST 10 MG: 10 TABLET, FILM COATED ORAL at 08:38

## 2024-12-10 ASSESSMENT — PAIN SCALES - GENERAL: PAINLEVEL_OUTOF10: 7

## 2024-12-10 ASSESSMENT — PAIN - FUNCTIONAL ASSESSMENT: PAIN_FUNCTIONAL_ASSESSMENT: ACTIVITIES ARE NOT PREVENTED

## 2024-12-10 ASSESSMENT — PAIN DESCRIPTION - LOCATION: LOCATION: CHEST;HIP

## 2024-12-10 ASSESSMENT — PAIN DESCRIPTION - DESCRIPTORS: DESCRIPTORS: ACHING

## 2024-12-10 ASSESSMENT — PAIN DESCRIPTION - ORIENTATION: ORIENTATION: LEFT;MID

## 2024-12-10 NOTE — PROGRESS NOTES
RN noted that patient appeared very drowsy throughout day, often sleeping through meals and drifting off during conversation. VSS. RN gave PRN Flexeril at 0838 this AM but has not given any sedatives or pain medications since. RN notified MD Moses of this via [employer]. MD aware, STAT VBG ordered. Pain medications and other sedatives also held. Electronically signed by Maria Esther Knox RN on 12/10/2024 at 5:15 PM     VBG WNL. MD Moses notified. Electronically signed by Maria Esther Knox RN on 12/10/2024 at 5:23 PM

## 2024-12-10 NOTE — PLAN OF CARE
Problem: Chronic Conditions and Co-morbidities  Goal: Patient's chronic conditions and co-morbidity symptoms are monitored and maintained or improved  12/10/2024 1502 by Maria Esther Knox RN  Outcome: Progressing  Flowsheets (Taken 12/10/2024 0902)  Care Plan - Patient's Chronic Conditions and Co-Morbidity Symptoms are Monitored and Maintained or Improved:   Monitor and assess patient's chronic conditions and comorbid symptoms for stability, deterioration, or improvement   Collaborate with multidisciplinary team to address chronic and comorbid conditions and prevent exacerbation or deterioration     Problem: Discharge Planning  Goal: Discharge to home or other facility with appropriate resources  12/10/2024 1502 by Maria Esther Knox RN  Outcome: Progressing  Flowsheets (Taken 12/10/2024 0902)  Discharge to home or other facility with appropriate resources:   Identify barriers to discharge with patient and caregiver   Arrange for needed discharge resources and transportation as appropriate   Identify discharge learning needs (meds, wound care, etc)     Problem: Safety - Adult  Goal: Free from fall injury  12/10/2024 1502 by Maria Esther Knox RN  Outcome: Progressing  Flowsheets (Taken 12/10/2024 0341 by Anand Adorno RN)  Free From Fall Injury: Instruct family/caregiver on patient safety     Problem: Cardiovascular - Adult  Goal: Maintains optimal cardiac output and hemodynamic stability  12/10/2024 1502 by Maria Esther Knox RN  Outcome: Progressing  Flowsheets (Taken 12/10/2024 0902)  Maintains optimal cardiac output and hemodynamic stability: Monitor blood pressure and heart rate     Problem: Cardiovascular - Adult  Goal: Absence of cardiac dysrhythmias or at baseline  12/10/2024 1502 by Maria Esther Knox RN  Outcome: Progressing  Flowsheets (Taken 12/10/2024 0902)  Absence of cardiac dysrhythmias or at baseline: Monitor cardiac rate and rhythm     Problem: Pain  Goal: Verbalizes/displays adequate comfort level or baseline comfort  [facility]  [address] [place]. [address]  [phone]      Patient Discharge Instructions    Siomara Collins / [id number] : 1946    Admitted 2024 Discharged: 3/4/2024     Active Hospital Problems    Chronic hypoxemic respiratory failure (HCC)      Interstitial lung disease (HCC)      Primary osteoarthritis involving multiple joints      Hypertension with renal disease      Controlled type 2 diabetes mellitus with stage 2 chronic kidney disease, with long-term current use of insulin (HCC)      Polymyalgia rheumatica (HCC)      Complicated urinary tract infection      UTI due to extended-spectrum beta lactamase (ESBL) producing Escherichia coli      Neuropathy           It is important that you take the medication exactly as they are prescribed.   Do not take other medications without consulting your doctor.       What to do at Next Level of Care    Disposition: SNF rehab at [facility]    Recommended diet: Diabetic    Recommended activity: Up with physical therapy    Wound Care: Apply Venelex ointment twice daily          Information obtained by :  I understand that if any problems occur once I am at home I am to contact my physician.    I understand and acknowledge receipt of the instructions indicated above.                                                                                                                                           Physician's or R.N.'s Signature                                                                  Date/Time                                                                                                                                              Patient or Representative Signature                                                          Date/Time     level  12/10/2024 1502 by Maria Esther Knox RN  Outcome: Progressing  Flowsheets (Taken 12/10/2024 0341 by Anand Adorno, RN)  Verbalizes/displays adequate comfort level or baseline comfort level:   Encourage patient to monitor pain and request assistance   Assess pain using appropriate pain scale   Implement non-pharmacological measures as appropriate and evaluate response   Administer analgesics based on type and severity of pain and evaluate response   Notify Licensed Independent Practitioner if interventions unsuccessful or patient reports new pain   Consider cultural and social influences on pain and pain management     Problem: ABCDS Injury Assessment  Goal: Absence of physical injury  12/10/2024 1502 by Maria Esther Knox, RN  Outcome: Progressing  Flowsheets (Taken 12/10/2024 0341 by Anand Adorno, RN)  Absence of Physical Injury: Implement safety measures based on patient assessment     Problem: Neurosensory - Adult  Goal: Achieves stable or improved neurological status  12/10/2024 1502 by Maria Esther Knox, RN  Outcome: Progressing  Flowsheets (Taken 12/10/2024 0902)  Achieves stable or improved neurological status: Assess for and report changes in neurological status     Problem: Respiratory - Adult  Goal: Achieves optimal ventilation and oxygenation  12/10/2024 1502 by Maria Esther Knox, RN  Outcome: Progressing  Flowsheets (Taken 12/10/2024 0902)  Achieves optimal ventilation and oxygenation:   Assess for changes in respiratory status   Assess for changes in mentation and behavior   Position to facilitate oxygenation and minimize respiratory effort   Oxygen supplementation based on oxygen saturation or arterial blood gases     Problem: Skin/Tissue Integrity - Adult  Goal: Skin integrity remains intact  12/10/2024 1502 by Maria Esther Knox, RN  Outcome: Progressing  Flowsheets (Taken 12/10/2024 0902)  Skin Integrity Remains Intact: Monitor for areas of redness and/or skin breakdown     Problem: Genitourinary - Adult  Goal: Absence  range  12/10/2024 1502 by Maria Esther nKox, RN  Outcome: Progressing  Flowsheets (Taken 12/10/2024 0902)  Glucose maintained within prescribed range: Monitor blood glucose as ordered     Problem: Hematologic - Adult  Goal: Maintains hematologic stability  12/10/2024 1502 by Maria Esther Knox, RN  Outcome: Progressing  Flowsheets (Taken 12/10/2024 0902)  Maintains hematologic stability: Assess for signs and symptoms of bleeding or hemorrhage

## 2024-12-10 NOTE — CARE COORDINATION
Case Management Assessment  Initial Evaluation    Date/Time of Evaluation: 12/10/2024 11:40 AM  Assessment Completed by: Trixie Baires RN    If patient is discharged prior to next notation, then this note serves as note for discharge by case management.    Patient Name: Maren Meza                   YOB: 1955  Diagnosis: Elevated troponin [R79.89]  Acute on chronic systolic congestive heart failure (HCC) [I50.23]  Goals of care, counseling/discussion [Z71.89]  Acute on chronic systolic right heart failure (HCC) [I50.813]  Chest pain, unspecified type [R07.9]  Chronic kidney disease, unspecified CKD stage [N18.9]                   Date / Time: 12/7/2024  7:47 PM    Patient Admission Status: Inpatient   Readmission Risk (Low < 19, Mod (19-27), High > 27): Readmission Risk Score: 28.9    Current PCP: Marin Cardenas MD  PCP verified by CM? Yes    Chart Reviewed: Yes      History Provided by: Patient  Patient Orientation: Alert and Oriented    Patient Cognition: Alert    Hospitalization in the last 30 days (Readmission):  No    If yes, Readmission Assessment in CM Navigator will be completed.    Advance Directives:      Code Status: Full Code   Patient's Primary Decision Maker is: Named in Scanned ACP Document    Primary Decision Maker: Neida Fisher - Child - [phone]    Secondary Decision Maker: Ester Jenkins - Child - [phone]    Secondary Decision Maker: SusanaNilesh - Child - [phone]    Discharge Planning:    Patient lives with: Alone Type of Home: Apartment  Primary Care Giver: Self  Patient Support Systems include: Children, Family Members   Current Financial resources: Medicare  Current community resources: ECF/Home Care, Other (Comment) (COA (HHA 5d/wk for 5hr/d), MOWs)  Current services prior to admission: Durable Medical Equipment, Meals On Wheels, Home Care            Current DME: Walker            Type of Home Care services:  Aide Services, Nursing Services, OT,  PT    ADLS  Prior functional level: Independent in ADLs/IADLs  Current functional level: Independent in ADLs/IADLs    NO current PT/OT orders    Family can provide assistance at DC: Yes  Would you like Case Management to discuss the discharge plan with any other family members/significant others, and if so, who? No  Plans to Return to Present Housing: Yes  Other Identified Issues/Barriers to RETURNING to current housing: None  Potential Assistance needed at discharge: N/A            Potential DME:  None  Patient expects to discharge to: Apartment  Plan for transportation at discharge: Family    Financial    Payor: [employer] / Plan: [employer] DUAL / Product Type: *No Product type* /     Does insurance require precert for SNF: Yes    Potential assistance Purchasing Medications: No  Meds-to-Beds request:        [facility] [address] [phone] - F [phone]  [address] [address]  Phone: [phone] Fax: [phone]    [facility] [address] -  [phone] - F [phone]  [address] [address]  Phone: [phone] Fax: [phone]    [id number] - [place], [place] - [address] [phone] - F [phone]  [address] [address]  Phone: [phone] Fax: [phone]      Notes:    Factors facilitating achievement of predicted outcomes: Family support, Motivated, Cooperative, Pleasant, Sense of humor, Good insight into deficits, and Has needed Durable Medical Equipment at home    Barriers to discharge: Decreased endurance, Lower extremity weakness, and Long standing deficits    Additional Case Management Notes:   DC plan to return home alone with family support.  Family will transport.  Active with [facility] for SN/PT/OT.  Active with [place] [facility] for HHA 5d/wk for 5hr/d, MOWs.    The Plan for Transition of

## 2024-12-10 NOTE — PROGRESS NOTES
V2.0  [facility] Hospitalist Progress Note      Name:  Maren Meza /Age/Sex: 1955  (69 y.o. female)   MRN & CSN:  [id number] & [id number] Encounter Date/Time: 12/10/2024 1:16 PM EST    Location:  [id number] PCP: Marin Cardenas MD       Hospital Day: 4  Subjective:   Chief Complaint: Follow-up renal failure    Patient seen and evaluated the bedside, she denies any more chest pain, unfortunately creatinine still at 2.9 in spite of the fact that she has been getting IV fluids in the form of bicarb drip.  Maintaining urine output, also noted to have urinary retention, UA is abnormal    Review of Systems:    Review of Systems    10 point ROS negative except as stated above in \"subjective\" section    Objective:     Intake/Output Summary (Last 24 hours) at 12/10/2024 1316  Last data filed at 12/10/2024 0725  Gross per 24 hour   Intake --   Output 350 ml   Net -350 ml      Vitals:   Vitals:    12/10/24 1230   BP: (!) 158/64   Pulse: 64   Resp: 16   Temp: 98.1 °F (36.7 °C)   SpO2: 97%     Physical Exam:   General: Awake, alert and oriented, NAD  Cardiovascular: S1S2 present, regular rate and rhythm, no murmurs  Respiratory: Clear to auscultation  Gastrointestinal: Soft, non tender, positive bowel sounds   Genitourinary: no suprapubic tenderness  Musculoskeletal: No edema  Neuro: Alert.    Medications:     Medications:    [START ON 2024] heparin (porcine)  5,000 Units SubCUTAneous BID    hydrALAZINE  100 mg Oral TID    amLODIPine  5 mg Oral Daily    sodium chloride flush  5-40 mL IntraVENous 2 times per day    aspirin  81 mg Oral Daily    atorvastatin  80 mg Oral Nightly    amitriptyline  40 mg Oral Nightly    oyster shell calcium w/D  1 tablet Oral Daily with breakfast    DULoxetine  60 mg Oral Daily    ferrous sulfate  325 mg Oral Daily with breakfast    fluticasone  1 spray Each Nostril Daily    budesonide-formoterol  2 puff Inhalation BID RT    isosorbide mononitrate  60 mg Oral BID     the renal artery stenosis needs to be addressed for more adequate blood pressure control, care discussed with cardiology, Dr. Garcia today, recommendation for outpatient follow-up with Dr. Rain, who the patient is familiar with, moreover right now may not be a good time for further contrast exposure as such cannot wait for outpatient follow-up    -Creatinine not improved, patient did get Lasix at presentation, that may have contributed, losartan is on hold, she has chronic acidosis, on bicarb drip, also had urinary retention, that may have contributed, nonetheless plan is to continue to monitor kidney function, recheck kidney function again tomorrow    -Saez catheter in place for urinary retention, UA is abnormal, she has a lot of leukocytes but patient also has a lot of red blood cells.  She has no urinary symptoms, white count is normal, we will hold off on antibiotics, on the CTA pelvis, bladder appeared unremarkable    -Patient's chest pain has been extensively evaluated, please refer to cardiology note for further details, no further interventions planned at this point    -Continue current medications for diabetes, atrial fibrillation    -Currently she appears euvolemic    Personally reviewed Lab Studies and Imaging reviewed     Electronically signed by Martha Lopes MD on 12/10/2024 at 1:16 PM    This not was generated completely or in part using Dragon, speech recognition software, please excuse any inaccuracies or misstatements.

## 2024-12-10 NOTE — PLAN OF CARE
Problem: Chronic Conditions and Co-morbidities  Goal: Patient's chronic conditions and co-morbidity symptoms are monitored and maintained or improved  Outcome: Progressing  Flowsheets (Taken 12/9/2024 2115)  Care Plan - Patient's Chronic Conditions and Co-Morbidity Symptoms are Monitored and Maintained or Improved: Monitor and assess patient's chronic conditions and comorbid symptoms for stability, deterioration, or improvement     Problem: Discharge Planning  Goal: Discharge to home or other facility with appropriate resources  Outcome: Progressing  Flowsheets (Taken 12/9/2024 2115)  Discharge to home or other facility with appropriate resources: Identify barriers to discharge with patient and caregiver     Problem: Safety - Adult  Goal: Free from fall injury  Outcome: Progressing  Flowsheets (Taken 12/10/2024 0341)  Free From Fall Injury: Instruct family/caregiver on patient safety     Problem: Cardiovascular - Adult  Goal: Maintains optimal cardiac output and hemodynamic stability  Outcome: Progressing  Flowsheets (Taken 12/9/2024 2115)  Maintains optimal cardiac output and hemodynamic stability:   Monitor blood pressure and heart rate   Monitor urine output and notify Licensed Independent Practitioner for values outside of normal range     Problem: Cardiovascular - Adult  Goal: Absence of cardiac dysrhythmias or at baseline  Outcome: Progressing  Flowsheets (Taken 12/9/2024 2115)  Absence of cardiac dysrhythmias or at baseline: Monitor cardiac rate and rhythm     Problem: Pain  Goal: Verbalizes/displays adequate comfort level or baseline comfort level  Outcome: Progressing  Flowsheets (Taken 12/10/2024 0341)  Verbalizes/displays adequate comfort level or baseline comfort level:   Encourage patient to monitor pain and request assistance   Assess pain using appropriate pain scale   Implement non-pharmacological measures as appropriate and evaluate response   Administer analgesics based on type and severity of  pain and evaluate response   Notify Licensed Independent Practitioner if interventions unsuccessful or patient reports new pain   Consider cultural and social influences on pain and pain management     Problem: ABCDS Injury Assessment  Goal: Absence of physical injury  Outcome: Progressing  Flowsheets (Taken 12/10/2024 0341)  Absence of Physical Injury: Implement safety measures based on patient assessment     Problem: Neurosensory - Adult  Goal: Achieves stable or improved neurological status  Outcome: Progressing  Flowsheets (Taken 12/10/2024 0341)  Achieves stable or improved neurological status: Assess for and report changes in neurological status     Problem: Respiratory - Adult  Goal: Achieves optimal ventilation and oxygenation  Outcome: Progressing  Flowsheets (Taken 12/10/2024 0341)  Achieves optimal ventilation and oxygenation:   Assess for changes in respiratory status   Assess for changes in mentation and behavior     Problem: Skin/Tissue Integrity - Adult  Goal: Skin integrity remains intact  Outcome: Progressing  Flowsheets (Taken 12/10/2024 0341)  Skin Integrity Remains Intact: Monitor for areas of redness and/or skin breakdown     Problem: Genitourinary - Adult  Goal: Absence of urinary retention  Outcome: Progressing  Flowsheets (Taken 12/10/2024 0341)  Absence of urinary retention:   Assess patient’s ability to void and empty bladder   Monitor intake/output and perform bladder scan as needed   Place urinary catheter per Licensed Independent Practitioner order if needed     Problem: Genitourinary - Adult  Goal: Urinary catheter remains patent  Outcome: Progressing  Flowsheets (Taken 12/10/2024 0341)  Urinary catheter remains patent: Assess patency of urinary catheter     Problem: Infection - Adult  Goal: Absence of infection at discharge  Outcome: Progressing  Flowsheets (Taken 12/10/2024 0341)  Absence of infection at discharge:   Assess and monitor for signs and symptoms of infection   Monitor

## 2024-12-10 NOTE — PROGRESS NOTES
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 Cr baseline ~ 1.5 mg/dL prior to this. Supposed to follow with Dr. Chung in office (last seen 11/2023).     HPI: Maren Meza is a 68 yo female with a PMHx of CKD 3b/4, h/o KOLBY, HTN, afib s/p watchman, CAD, CHF, COPD, DM2, HLD. Patient presents to  ED on 12/7/2024 with complaints of chest pain and SOB. Troponins 54>46. CTA Chest abdomen is negative for acute findings. Imaging does reveal moderate to severe R renal artery disease. Patient is supposed to have an appt with vascular surgery tomorrow. Cardiology saw pt this admission and is going discuss with Dr. Allred.     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 Baseline Cr has more recently been ~ 1.7-2.0 mg/dL. Cr upon admission was 2.2 mg/dL. Today, Cr is 2.9 mg/dL. Received IV contrast on 12/7/2024. Taking losartan (received doses 12/8 and 12/9). Received 40 mg IVP lasix on 12/8. Denies N/V/D. Endorses some urinary hesitancy. Denies NSAID use.     Subjective:    The patient has been seen and examined. Labs and chart reviewed. Resting in bed. On RA. Saez catheter was placed yesterday for urinary retention -- per patient she was \"out of it\" yesterday. Cr is flat.     There were not complications overnight.    Patient review of systems: Denies SOB       Allergies:  Allergies   Allergen Reactions    Bee Venom Shortness Of Breath        Scheduled Meds:   sodium chloride flush  5-40 mL IntraVENous 2 times per day    enoxaparin  1 mg/kg SubCUTAneous Daily    aspirin  81 mg Oral Daily    atorvastatin  80 mg Oral Nightly    amitriptyline  40 mg Oral Nightly    oyster shell calcium w/D  1 tablet Oral Daily with breakfast    DULoxetine  60 mg Oral Daily    ferrous sulfate  325 mg Oral Daily with breakfast    fluticasone  1 spray Each Nostril Daily    budesonide-formoterol  2 puff Inhalation BID RT    hydrALAZINE  25 mg  Oral TID    isosorbide mononitrate  60 mg Oral BID    levETIRAcetam  500 mg Oral BID    linaclotide  290 mcg Oral Once per day on     metoprolol succinate  50 mg Oral BID    montelukast  10 mg Oral Daily    pantoprazole  40 mg Oral QAM AC    predniSONE  10 mg Oral Daily    QUEtiapine  100 mg Oral Nightly    ranolazine  1,000 mg Oral BID    ticagrelor  90 mg Oral BID    insulin lispro  0-4 Units SubCUTAneous 4x Daily AC & HS        sodium bicarbonate 75 mEq in sodium chloride 0.45 % 1,000 mL infusion 50 mL/hr at 12/10/24 1030    sodium chloride      dextrose      dextrose         PRN Meds:diphenhydrAMINE, sodium chloride flush, sodium chloride, ondansetron **OR** ondansetron, polyethylene glycol, acetaminophen **OR** [DISCONTINUED] acetaminophen, perflutren lipid microspheres, cyclobenzaprine, traMADol, glucose, dextrose bolus **OR** dextrose bolus, glucagon (rDNA), dextrose, dextrose, hydrALAZINE    Physical Exam:    TEMPERATURE:  Current - Temp: 98 °F (36.7 °C); Max - Temp  Av.8 °F (36.6 °C)  Min: 97.5 °F (36.4 °C)  Max: 98.1 °F (36.7 °C)  RESPIRATIONS RANGE: Resp  Av.6  Min: 16  Max: 19  PULSE RANGE: Pulse  Av.7  Min: 55  Max: 63  BLOOD PRESSURE RANGE:  Systolic (24hrs), Av , Min:155 , Max:184   ; Diastolic (24hrs), Av, Min:59, Max:89    24HR INTAKE/OUTPUT:    Intake/Output Summary (Last 24 hours) at 12/10/2024 1105  Last data filed at 12/10/2024 0725  Gross per 24 hour   Intake --   Output 350 ml   Net -350 ml       Patient Vitals for the past 96 hrs (Last 3 readings):   Weight   12/10/24 0327 49 kg (108 lb 0.4 oz)   24 1426 48.5 kg (107 lb)   24 0500 48.7 kg (107 lb 5.8 oz)       General: Alert, Awake, NAD  HEENT: Normocephalic, atraumatic  Chest: clear to auscultation, no intercostal retractions  CVS: RRR, no murmur, no rub  Abdomen: soft, non tender  Extremities: no edema, no cyanosis.  Psych: normal affect; AAO x 3    LAB DATA:    CBC:   Lab Results

## 2024-12-10 NOTE — PROGRESS NOTES
Department of Internal Medicine  Nephrology Progress Note        Reason for consultation: KOLBY      History of Present Illness: Maren Meza is a 68 yo female with a PMHx of CKD 3b/4, h/o KOLBY, HTN, afib s/p watchman, CAD, CHF, COPD, DM2, HLD. Patient presents to  ED on 12/7/2024 with complaints of chest pain and SOB. Troponins 54>46. CTA Chest abdomen is negative for acute findings. Imaging does reveal moderate to severe R renal artery disease. Patient is supposed to have an appt with vascular surgery tomorrow. Cardiology saw pt this admission and is going discuss with Dr. Allred.           Events noted . Sp  gill cath placement   REVIEW OF SYSTEMS:  Resting , no acute complaints     Physical Exam:    VITALS:  BP (!) 158/64   Pulse 64   Temp 98.1 °F (36.7 °C) (Oral)   Resp 16   Ht 1.524 m (5')   Wt 49 kg (108 lb 0.4 oz)   SpO2 97%   BMI 21.10 kg/m²   24HR INTAKE/OUTPUT:    Intake/Output Summary (Last 24 hours) at 12/10/2024 1311  Last data filed at 12/10/2024 0725  Gross per 24 hour   Intake --   Output 350 ml   Net -350 ml       Constitutional: Looks comfortable   Respiratory:  CTA  Gastrointestinal:  No  tenderness.  Normal Bowel Sounds  Cardiovascular:  S1, S2 Irregular   Edema:   +  edema    DATA:    CBC:  Lab Results   Component Value Date/Time    WBC 7.0 12/10/2024 05:25 AM    RBC 3.06 12/10/2024 05:25 AM    HGB 9.6 12/10/2024 05:25 AM    HCT 29.0 12/10/2024 05:25 AM    MCV 94.6 12/10/2024 05:25 AM    MCH 31.4 12/10/2024 05:25 AM    MCHC 33.2 12/10/2024 05:25 AM    RDW 14.7 12/10/2024 05:25 AM     12/10/2024 05:25 AM    MPV 8.4 12/10/2024 05:25 AM     CMP:  Lab Results   Component Value Date/Time     12/10/2024 05:25 AM    K 3.9 12/10/2024 05:25 AM    K 3.8 12/07/2024 08:25 PM     12/10/2024 05:25 AM    CO2 22 12/10/2024 05:25 AM    BUN 36 12/10/2024 05:25 AM    CREATININE 2.9 12/10/2024 05:25 AM    GFRAA 46 10/09/2022 06:18 AM    GFRAA 60 05/23/2013 02:56 PM    AGRATIO 1.1  12/07/2024 08:25 PM    LABGLOM 17 12/10/2024 05:25 AM    LABGLOM 49 04/22/2024 04:43 AM    GLUCOSE 148 12/10/2024 05:25 AM    CALCIUM 9.4 12/10/2024 05:25 AM    BILITOT <0.2 12/08/2024 06:15 AM    ALKPHOS 144 12/08/2024 06:15 AM    AST 40 12/08/2024 06:15 AM    ALT 27 12/08/2024 06:15 AM      Hepatic Function Panel:   Lab Results   Component Value Date/Time    ALKPHOS 144 12/08/2024 06:15 AM    ALT 27 12/08/2024 06:15 AM    AST 40 12/08/2024 06:15 AM    BILITOT <0.2 12/08/2024 06:15 AM    BILIDIR <0.1 12/08/2024 06:15 AM    IBILI see below 12/08/2024 06:15 AM      Phosphorus:   Lab Results   Component Value Date/Time    PHOS 3.4 12/10/2024 05:25 AM       ASSESSMENT:  Principal Problem (Resolved):    Acute on chronic systolic right heart failure (HCC)  Active Problems:    Paroxysmal A-fib (HCC)    Chest pain    Elevated troponin I level    Acute on chronic diastolic heart failure (HCC)    Type 2 diabetes mellitus with diabetic chronic kidney disease (HCC)    Hypertensive heart and chronic kidney disease with heart failure and stage 1 through stage 4 chronic kidney disease, or unspecified chronic kidney disease (HCC)    CKD stage 3a, GFR 45-59 ml/min (HCC)    Type 2 diabetes mellitus with mild nonproliferative diabetic retinopathy without macular edema, bilateral (HCC)    Hypertensive emergency      PLAN:  1- 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 Cr baseline ~ 1.5 mg/dL prior to this.   Supposed to follow with Dr. Chung in office (last seen 11/2023). Etiology of KOLBY likely 2/2 contrast induced nephropathy in the setting of losartan use.              - Cr 2.9 , unchanged               - s/p gill placement . Will need void trial pre dc .               - Hold losartan              - Avoid nephrotoxic agents              - No indication for RRT     2- Non anion gap metabolic acidosis: 2/2 KOLBY/CKD              - on  bicarb gtt. Will dc      3- Chest pain              - Management per cardiology

## 2024-12-10 NOTE — ACP (ADVANCE CARE PLANNING)
Advance Care Planning   Healthcare Decision Maker:    Primary Decision Maker: Neida Fisher - Child - [phone]    Secondary Decision Maker: Ester Jenkins - Child - [phone]    Secondary Decision Maker: Nilesh Meza - Child - [phone]    Today we documented Decision Maker(s) consistent with ACP documents on file.     #[phone]  Electronically signed by Trixie Baires RN on 12/10/2024 at 11:37 AM

## 2024-12-10 NOTE — PROGRESS NOTES
[facility]  Inpatient Progress Note    CC:         Chest pain and shortness of breath          HPI:   This is a 69 y.o. female who I have known for close to 30 years with head innumerable admissions for chest pains, recurrent revascularization for coronary disease with multiple stents and now most recently with diastolic heart failure came to the ER for chest pain exertional fatigue.  She claims to be compliant with her medications.  Patient was seen by Dr. Riddle yesterday who documented that the patient's admission weight was 120 pounds when her dry weight is 110 pounds.  She is now on IV Lasix.      Interval history  No complaints      Review of Systems -   Constitutional: Negative for weight gain/loss; malaise, fever  Respiratory: Negative for Asthma;  cough and hemoptysis  Cardiovascular: Negative for palpitations,dizziness   Gastrointestinal: Negative for abd.pain; constipation/diarrhea;    Genitourinary: Negative for stones; hematuria; frequency hesitancy  Integumentt: Negative for rash or pruritis  Hematologic/lymphatic: Negative for blood dyscrasia; leukemia/lymphoma  Musculoskeletal: Negative for Connective tissue disease  Neurological:  Negative for Seizure   Behavioral/Psych:Negative for Bipolar disorder, Schizophrenia; Dementia  Endocrine: negative for thyroid, parathyroid disease      Intake/Output Summary (Last 24 hours) at 12/10/2024 0912  Last data filed at 12/10/2024 0725  Gross per 24 hour   Intake 300 ml   Output 350 ml   Net -50 ml         Physical Examination:    BP (!) 165/63   Pulse 63   Temp 98 °F (36.7 °C) (Oral)   Resp 16   Ht 1.524 m (5')   Wt 49 kg (108 lb 0.4 oz)   SpO2 94%   BMI 21.10 kg/m²   HEENT:  Face: Atraumatic, Conjunctiva: Pink; non icteric,  Mucous Memb:  Moist, No thyromegaly or Lymphadenopathy  Respiratory:  Resp Assessment: Normal, Resp Auscultation: clear   Cardiovascular:  Auscultation: nl S1 & S2, Palpation:  Nl PMI; No heaves or thrills, JVP:

## 2024-12-11 LAB
ALBUMIN SERPL-MCNC: 3.4 G/DL (ref 3.4–5)
ANION GAP SERPL CALCULATED.3IONS-SCNC: 14 MMOL/L (ref 3–16)
BACTERIA UR CULT: NORMAL
BUN SERPL-MCNC: 48 MG/DL (ref 7–20)
CALCIUM SERPL-MCNC: 9.3 MG/DL (ref 8.3–10.6)
CHLORIDE SERPL-SCNC: 105 MMOL/L (ref 99–110)
CO2 SERPL-SCNC: 20 MMOL/L (ref 21–32)
CREAT SERPL-MCNC: 3.2 MG/DL (ref 0.6–1.2)
DEPRECATED RDW RBC AUTO: 14.9 % (ref 12.4–15.4)
GFR SERPLBLD CREATININE-BSD FMLA CKD-EPI: 15 ML/MIN/{1.73_M2}
GLUCOSE BLD-MCNC: 157 MG/DL (ref 70–99)
GLUCOSE BLD-MCNC: 179 MG/DL (ref 70–99)
GLUCOSE BLD-MCNC: 200 MG/DL (ref 70–99)
GLUCOSE BLD-MCNC: 208 MG/DL (ref 70–99)
GLUCOSE SERPL-MCNC: 125 MG/DL (ref 70–99)
HCT VFR BLD AUTO: 27.8 % (ref 36–48)
HGB BLD-MCNC: 9 G/DL (ref 12–16)
MAGNESIUM SERPL-MCNC: 2.04 MG/DL (ref 1.8–2.4)
MCH RBC QN AUTO: 30.8 PG (ref 26–34)
MCHC RBC AUTO-ENTMCNC: 32.2 G/DL (ref 31–36)
MCV RBC AUTO: 95.4 FL (ref 80–100)
NT-PROBNP SERPL-MCNC: 4867 PG/ML (ref 0–124)
PERFORMED ON: ABNORMAL
PHOSPHATE SERPL-MCNC: 2.8 MG/DL (ref 2.5–4.9)
PLATELET # BLD AUTO: 174 K/UL (ref 135–450)
PMV BLD AUTO: 8.7 FL (ref 5–10.5)
POTASSIUM SERPL-SCNC: 4.1 MMOL/L (ref 3.5–5.1)
RBC # BLD AUTO: 2.91 M/UL (ref 4–5.2)
SODIUM SERPL-SCNC: 139 MMOL/L (ref 136–145)
WBC # BLD AUTO: 7 K/UL (ref 4–11)

## 2024-12-11 PROCEDURE — 94760 N-INVAS EAR/PLS OXIMETRY 1: CPT

## 2024-12-11 PROCEDURE — 6370000000 HC RX 637 (ALT 250 FOR IP): Performed by: HOSPITALIST

## 2024-12-11 PROCEDURE — 6370000000 HC RX 637 (ALT 250 FOR IP): Performed by: INTERNAL MEDICINE

## 2024-12-11 PROCEDURE — 6360000002 HC RX W HCPCS: Performed by: HOSPITALIST

## 2024-12-11 PROCEDURE — 2580000003 HC RX 258

## 2024-12-11 PROCEDURE — 2580000003 HC RX 258: Performed by: HOSPITALIST

## 2024-12-11 PROCEDURE — 85027 COMPLETE CBC AUTOMATED: CPT

## 2024-12-11 PROCEDURE — 2500000003 HC RX 250 WO HCPCS

## 2024-12-11 PROCEDURE — 83735 ASSAY OF MAGNESIUM: CPT

## 2024-12-11 PROCEDURE — 83880 ASSAY OF NATRIURETIC PEPTIDE: CPT

## 2024-12-11 PROCEDURE — 99232 SBSQ HOSP IP/OBS MODERATE 35: CPT | Performed by: INTERNAL MEDICINE

## 2024-12-11 PROCEDURE — 36415 COLL VENOUS BLD VENIPUNCTURE: CPT

## 2024-12-11 PROCEDURE — 2060000000 HC ICU INTERMEDIATE R&B

## 2024-12-11 PROCEDURE — 94640 AIRWAY INHALATION TREATMENT: CPT

## 2024-12-11 PROCEDURE — 80069 RENAL FUNCTION PANEL: CPT

## 2024-12-11 RX ORDER — INSULIN LISPRO 100 [IU]/ML
0-8 INJECTION, SOLUTION INTRAVENOUS; SUBCUTANEOUS
Status: DISCONTINUED | OUTPATIENT
Start: 2024-12-11 | End: 2024-12-11

## 2024-12-11 RX ORDER — INSULIN LISPRO 100 [IU]/ML
3 INJECTION, SOLUTION INTRAVENOUS; SUBCUTANEOUS
Status: DISCONTINUED | OUTPATIENT
Start: 2024-12-11 | End: 2024-12-13

## 2024-12-11 RX ORDER — INSULIN GLARGINE 100 [IU]/ML
0.15 INJECTION, SOLUTION SUBCUTANEOUS NIGHTLY
Status: DISCONTINUED | OUTPATIENT
Start: 2024-12-11 | End: 2024-12-15

## 2024-12-11 RX ORDER — INSULIN LISPRO 100 [IU]/ML
0-4 INJECTION, SOLUTION INTRAVENOUS; SUBCUTANEOUS
Status: DISCONTINUED | OUTPATIENT
Start: 2024-12-11 | End: 2024-12-16 | Stop reason: HOSPADM

## 2024-12-11 RX ADMIN — RANOLAZINE 1000 MG: 500 TABLET, FILM COATED, EXTENDED RELEASE ORAL at 09:33

## 2024-12-11 RX ADMIN — HEPARIN SODIUM 5000 UNITS: 5000 INJECTION INTRAVENOUS; SUBCUTANEOUS at 09:33

## 2024-12-11 RX ADMIN — ACETAMINOPHEN 650 MG: 325 TABLET ORAL at 09:34

## 2024-12-11 RX ADMIN — LEVETIRACETAM 500 MG: 500 TABLET, FILM COATED ORAL at 09:33

## 2024-12-11 RX ADMIN — FERROUS SULFATE TAB 325 MG (65 MG ELEMENTAL FE) 325 MG: 325 (65 FE) TAB at 09:33

## 2024-12-11 RX ADMIN — SODIUM BICARBONATE: 84 INJECTION, SOLUTION INTRAVENOUS at 12:36

## 2024-12-11 RX ADMIN — AMITRIPTYLINE HYDROCHLORIDE 40 MG: 10 TABLET, FILM COATED ORAL at 20:49

## 2024-12-11 RX ADMIN — FLUTICASONE PROPIONATE 1 SPRAY: 50 SPRAY, METERED NASAL at 09:32

## 2024-12-11 RX ADMIN — HYDRALAZINE HYDROCHLORIDE 100 MG: 50 TABLET ORAL at 09:33

## 2024-12-11 RX ADMIN — ISOSORBIDE MONONITRATE 60 MG: 60 TABLET ORAL at 09:33

## 2024-12-11 RX ADMIN — HYDRALAZINE HYDROCHLORIDE 100 MG: 50 TABLET ORAL at 20:50

## 2024-12-11 RX ADMIN — ASPIRIN 81 MG: 81 TABLET, CHEWABLE ORAL at 09:33

## 2024-12-11 RX ADMIN — METOPROLOL SUCCINATE 50 MG: 50 TABLET, EXTENDED RELEASE ORAL at 09:33

## 2024-12-11 RX ADMIN — INSULIN LISPRO 2 UNITS: 100 INJECTION, SOLUTION INTRAVENOUS; SUBCUTANEOUS at 13:59

## 2024-12-11 RX ADMIN — HYDRALAZINE HYDROCHLORIDE 100 MG: 50 TABLET ORAL at 13:59

## 2024-12-11 RX ADMIN — HEPARIN SODIUM 5000 UNITS: 5000 INJECTION INTRAVENOUS; SUBCUTANEOUS at 20:50

## 2024-12-11 RX ADMIN — RANOLAZINE 1000 MG: 500 TABLET, FILM COATED, EXTENDED RELEASE ORAL at 20:49

## 2024-12-11 RX ADMIN — INSULIN LISPRO 3 UNITS: 100 INJECTION, SOLUTION INTRAVENOUS; SUBCUTANEOUS at 18:50

## 2024-12-11 RX ADMIN — METOPROLOL SUCCINATE 50 MG: 50 TABLET, EXTENDED RELEASE ORAL at 20:50

## 2024-12-11 RX ADMIN — PANTOPRAZOLE SODIUM 40 MG: 40 TABLET, DELAYED RELEASE ORAL at 06:19

## 2024-12-11 RX ADMIN — INSULIN GLARGINE 7 UNITS: 100 INJECTION, SOLUTION SUBCUTANEOUS at 21:06

## 2024-12-11 RX ADMIN — ISOSORBIDE MONONITRATE 60 MG: 60 TABLET ORAL at 20:49

## 2024-12-11 RX ADMIN — PREDNISONE 10 MG: 10 TABLET ORAL at 09:33

## 2024-12-11 RX ADMIN — ATORVASTATIN CALCIUM 80 MG: 80 TABLET, FILM COATED ORAL at 20:50

## 2024-12-11 RX ADMIN — Medication 2 PUFF: at 19:58

## 2024-12-11 RX ADMIN — MONTELUKAST 10 MG: 10 TABLET, FILM COATED ORAL at 09:33

## 2024-12-11 RX ADMIN — INSULIN LISPRO 1 UNITS: 100 INJECTION, SOLUTION INTRAVENOUS; SUBCUTANEOUS at 18:50

## 2024-12-11 RX ADMIN — TICAGRELOR 90 MG: 90 TABLET ORAL at 09:33

## 2024-12-11 RX ADMIN — QUETIAPINE FUMARATE 100 MG: 100 TABLET ORAL at 20:50

## 2024-12-11 RX ADMIN — LEVETIRACETAM 500 MG: 500 TABLET, FILM COATED ORAL at 20:50

## 2024-12-11 RX ADMIN — CALCIUM CARBONATE-VITAMIN D TAB 500 MG-200 UNIT 1 TABLET: 500-200 TAB at 09:33

## 2024-12-11 RX ADMIN — DULOXETINE HYDROCHLORIDE 60 MG: 60 CAPSULE, DELAYED RELEASE ORAL at 09:33

## 2024-12-11 RX ADMIN — SODIUM CHLORIDE, PRESERVATIVE FREE 10 ML: 5 INJECTION INTRAVENOUS at 09:33

## 2024-12-11 RX ADMIN — TICAGRELOR 90 MG: 90 TABLET ORAL at 20:50

## 2024-12-11 RX ADMIN — Medication 2 PUFF: at 07:46

## 2024-12-11 RX ADMIN — AMLODIPINE BESYLATE 5 MG: 5 TABLET ORAL at 09:33

## 2024-12-11 ASSESSMENT — PAIN - FUNCTIONAL ASSESSMENT: PAIN_FUNCTIONAL_ASSESSMENT: ACTIVITIES ARE NOT PREVENTED

## 2024-12-11 ASSESSMENT — PAIN SCALES - GENERAL: PAINLEVEL_OUTOF10: 3

## 2024-12-11 ASSESSMENT — PAIN DESCRIPTION - DESCRIPTORS: DESCRIPTORS: ACHING

## 2024-12-11 ASSESSMENT — PAIN DESCRIPTION - LOCATION: LOCATION: HEAD

## 2024-12-11 NOTE — PLAN OF CARE
Problem: Chronic Conditions and Co-morbidities  Goal: Patient's chronic conditions and co-morbidity symptoms are monitored and maintained or improved  12/10/2024 2257 by Fransico Tabor RN  Outcome: Progressing     Problem: Discharge Planning  Goal: Discharge to home or other facility with appropriate resources  12/10/2024 2257 by Fransico Tabor RN  Outcome: Progressing     Problem: Safety - Adult  Goal: Free from fall injury  12/10/2024 2257 by Fransico Tabor RN  Outcome: Progressing     Problem: Cardiovascular - Adult  Goal: Maintains optimal cardiac output and hemodynamic stability  12/10/2024 2257 by Fransico Tabor RN  Outcome: Progressing     Problem: Cardiovascular - Adult  Goal: Absence of cardiac dysrhythmias or at baseline  12/10/2024 2257 by Fransico Tabor RN  Outcome: Progressing     Problem: Pain  Goal: Verbalizes/displays adequate comfort level or baseline comfort level  12/10/2024 2257 by Fransico Tabor RN  Outcome: Progressing     Problem: ABCDS Injury Assessment  Goal: Absence of physical injury  12/10/2024 2257 by Fransico Tabor RN  Outcome: Progressing     Problem: Neurosensory - Adult  Goal: Achieves stable or improved neurological status  12/10/2024 2257 by Fransico Tabor RN  Outcome: Progressing     Problem: Respiratory - Adult  Goal: Achieves optimal ventilation and oxygenation  12/10/2024 2257 by Fransico Tabor RN  Outcome: Progressing     Problem: Skin/Tissue Integrity - Adult  Goal: Skin integrity remains intact  12/10/2024 2257 by Fransico Tabor RN  Outcome: Progressing  Flowsheets (Taken 12/10/2024 1505 by Maria Esther Knox RN)  Skin Integrity Remains Intact: Monitor for areas of redness and/or skin breakdown     Problem: Genitourinary - Adult  Goal: Absence of urinary retention  12/10/2024 2257 by Fransico Tabor RN  Outcome: Progressing     Problem: Genitourinary - Adult  Goal: Urinary catheter remains patent  12/10/2024 2257 by Fransico Tabor RN  Outcome: Progressing     Problem: Infection -  forgiveness  2. Provide emotional and spiritual support  3. Provide information about the patient's health status with consideration of family and cultural values  4. Communicate willingness to discuss loss and facilitate grief process with patient/family as appropriate  5. Emphasize sustaining relationships within family system and community, or luis/spiritual traditions  6. Initiate Spiritual Care, Psychosocial Clinical Specialist consult as needed  Outcome: Progressing     Problem: Decision Making  Goal: Pt/Family able to effectively weigh alternatives and participate in decision making related to treatment and care  Description: INTERVENTIONS:  1. Determine when there are differences between patient's view, family's view, and healthcare provider's view of condition  2. Facilitate patient and family articulation of goals for care  3. Help patient and family identify pros/cons of alternative solutions  4. Provide information as requested by patient/family  5. Respect patient/family right to receive or not to receive information  6. Serve as a liaison between patient and family and health care team  7. Initiate Consults from Ethics, Palliative Care or initiate Family Care Conference as is appropriate  Outcome: Progressing     Problem: Confusion  Goal: Confusion, delirium, dementia, or psychosis is improved or at baseline  Description: INTERVENTIONS:  1. Assess for possible contributors to thought disturbance, including medications, impaired vision or hearing, underlying metabolic abnormalities, dehydration, psychiatric diagnoses, and notify attending LIP  2. [place] high risk fall precautions, as indicated  3. Provide frequent short contacts to provide reality reorientation, refocusing and direction  4. Decrease environmental stimuli, including noise as appropriate  5. Monitor and intervene to maintain adequate nutrition, hydration, elimination, sleep and activity  6. If unable to ensure safety without constant

## 2024-12-11 NOTE — CONSULTS
[facility]  HEART FAILURE PROGRAM      NAME:  Maren Meza  MEDICAL RECORD NUMBER:  [id number]  AGE: 69 y.o.   GENDER: female  : 1955  ADMIT DATE: 2024  TODAY'S DATE:  2024    Subjective:     VISIT TYPE: evaluation     ADMITTING PHYSICIAN:  Kali Faustin MD    Code Status: Full Code    PAST MEDICAL HISTORY        Diagnosis Date    Acid reflux     Anemia     Anxiety and depression     Arthritis     Asthma     Atrial fibrillation (HCC)     CAD (coronary artery disease) 12/3/2012    Cerebral artery occlusion with cerebral infarction (HCC)     TIA\"\"S--right sided weakness & headache    CHF (congestive heart failure) ([facility])     Chronic kidney disease--stage III     40% kidney function    COPD (chronic obstructive pulmonary disease) (HCC)     DM2 (diabetes mellitus, type 2) ([facility])     Dysarthria     Fibromyalgia 2016    Headache(784.0) 2013    Hemisensory loss     History of blood transfusion 2020    pt denies having transfusion reaction    Hyperlipidemia     Hypertension     IBS (irritable bowel syndrome)     Inferior vena cava occlusion (HCC)     Keratitis     Meningioma (HCC)     MI, old     Neuropathy     Superior vena cava obstruction     Temporal arteritis (HCC) 2014    Wears glasses        SOCIAL HISTORY    Social History     Tobacco Use    Smoking status: Former     Current packs/day: 0.00     Average packs/day: 0.5 packs/day for 35.0 years (17.5 ttl pk-yrs)     Types: Cigarettes     Start date: 1983     Quit date: 2018     Years since quittin.4    Smokeless tobacco: Never    Tobacco comments:     5/13/15 has not smoked since hospitalization - kh   Vaping Use    Vaping status: Never Used   Substance Use Topics    Alcohol use: No     Alcohol/week: 0.0 standard drinks of alcohol    Drug use: Never         MEDICATIONS  Scheduled Meds:   insulin glargine  0.15 Units/kg SubCUTAneous Nightly    insulin lispro  0-8 Units SubCUTAneous 4x Daily AC & HS  up appointment scheduled with her primary care MD next week      CURRENT DIET: ADULT DIET; Regular; 4 carb choices (60 gm/meal); No Added Salt (3-4 gm); 1500 ml    EDUCATIONAL PACKETS PROVIDED:   Titles and material given:  Yes   [x]  [facility] Pt Education Booklet given and reviewed  [x]  HF Zones & Weight log provided   []  Additional dietary material  []  Smoking Cessation  []  Other:    PATIENT/CAREGIVER TEACHING:    Level of patient/caregiver understanding:   [x] Verbalized understanding   [x] Could provide teach back       [] Needs reinforcement      [] Other:         TEACHING TIME:  20 minutes       Plan:       DISCHARGE PLAN:  Placement for patient upon discharge: home with support   Discharge appointment scheduled: Yes     RECOMMENDATIONS:   [x]  Encourage to call Heart Failure Resource Line with any further questions or concerns  [x]  Continue to reiterate HF education   [x]  If EF </=40%, ensure GDMT has been met at time of dc (if not on GDMT ensure contraindication is documented)  []  [facility] Referral placed  []  Cardiac Rehab Phase 1 referral placed and/or contact info given  [x]  Continue to support pt's present goal of:  Feel better          Electronically signed by Marah Mckenzie, RN, BSN CHFN  on 12/11/2024 at 2:28 PM

## 2024-12-11 NOTE — PROGRESS NOTES
Nephrology Progress Note   [email or web address]      Reason for consultation: KOLBY on CKD 3b/4 -- recent baseline ~ 1.7-2.0 mg/dL since 6/2024. Cr baseline ~ 1.5 mg/dL prior to this. Supposed to follow with Dr. Chung in office (last seen 11/2023).     HPI: Maren Meza is a 68 yo female with a PMHx of CKD 3b/4, h/o KOLBY, HTN, afib s/p watchman, CAD, CHF, COPD, DM2, HLD. Patient presents to  ED on 12/7/2024 with complaints of chest pain and SOB. Troponins 54>46. CTA Chest abdomen is negative for acute findings. Imaging does reveal moderate to severe R renal artery disease. Patient is supposed to have an appt with vascular surgery tomorrow. Cardiology saw pt this admission and is going discuss with Dr. Allred.     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 Baseline Cr has more recently been ~ 1.7-2.0 mg/dL. Cr upon admission was 2.2 mg/dL. Today, Cr is 2.9 mg/dL. Received IV contrast on 12/7/2024. Taking losartan (received doses 12/8 and 12/9). Received 40 mg IVP lasix on 12/8. Denies N/V/D. Endorses some urinary hesitancy. Denies NSAID use.     Subjective:    The patient has been seen and examined. Labs and chart reviewed. Resting in bed. On RA. Cr is continuing to trend up.     There were not complications overnight.    Patient review of systems: Denies SOB       Allergies:  Allergies   Allergen Reactions    Bee Venom Shortness Of Breath        Scheduled Meds:   heparin (porcine)  5,000 Units SubCUTAneous BID    hydrALAZINE  100 mg Oral TID    amLODIPine  5 mg Oral Daily    sodium chloride flush  5-40 mL IntraVENous 2 times per day    aspirin  81 mg Oral Daily    atorvastatin  80 mg Oral Nightly    [Held by provider] amitriptyline  40 mg Oral Nightly    oyster shell calcium w/D  1 tablet Oral Daily with breakfast    DULoxetine  60 mg Oral Daily    ferrous sulfate  325 mg Oral Daily with breakfast    fluticasone  1 spray Each Nostril Daily    budesonide-formoterol  2 puff Inhalation BID RT    isosorbide     HCT 27.8 12/11/2024 06:15 AM    MCV 95.4 12/11/2024 06:15 AM    MCH 30.8 12/11/2024 06:15 AM    MCHC 32.2 12/11/2024 06:15 AM    RDW 14.9 12/11/2024 06:15 AM     12/11/2024 06:15 AM    MPV 8.7 12/11/2024 06:15 AM     BMP:    Lab Results   Component Value Date/Time     12/11/2024 06:15 AM    K 4.1 12/11/2024 06:15 AM    K 3.8 12/07/2024 08:25 PM     12/11/2024 06:15 AM    CO2 20 12/11/2024 06:15 AM    BUN 48 12/11/2024 06:15 AM    CREATININE 3.2 12/11/2024 06:15 AM    CALCIUM 9.3 12/11/2024 06:15 AM    GFRAA 46 10/09/2022 06:18 AM    GFRAA 60 05/23/2013 02:56 PM    LABGLOM 15 12/11/2024 06:15 AM    LABGLOM 49 04/22/2024 04:43 AM    GLUCOSE 125 12/11/2024 06:15 AM     Ionized Calcium:  No components found for: \"IONCA\"  Magnesium:    Lab Results   Component Value Date/Time    MG 2.04 12/11/2024 06:15 AM     Phosphorus:    Lab Results   Component Value Date/Time    PHOS 2.8 12/11/2024 06:15 AM     U/A:    Lab Results   Component Value Date/Time    COLORU Yellow 12/10/2024 08:47 AM    PHUR 5.0 12/10/2024 08:47 AM    PHUR 5.5 04/05/2024 11:35 AM    LABCAST 20-40 Hyaline 07/06/2017 10:42 PM    WBCUA 38 12/10/2024 08:47 AM    RBCUA 636 12/10/2024 08:47 AM    MUCUS 1+ 05/23/2013 01:27 PM    YEAST Rare Yeast 05/14/2012 03:29 PM    BACTERIA None Seen 12/10/2024 08:47 AM    CLARITYU CLOUDY 12/10/2024 08:47 AM    LEUKOCYTESUR MODERATE 12/10/2024 08:47 AM    UROBILINOGEN 1.0 12/10/2024 08:47 AM    BILIRUBINUR Negative 12/10/2024 08:47 AM    BLOODU LARGE 12/10/2024 08:47 AM    GLUCOSEU Negative 12/10/2024 08:47 AM    GLUCOSEU NEGATIVE 05/14/2012 03:29 PM    AMORPHOUS 2+ 08/13/2024 02:01 PM         IMPRESSION/RECOMMENDATIONS:      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 Cr baseline ~ 1.5 mg/dL prior to this. Supposed to follow with Dr. Chung in office (last seen 11/2023). Etiology of KOLBY likely 2/2 contrast induced nephropathy in the setting of losartan use.              - Cr

## 2024-12-11 NOTE — PROGRESS NOTES
[facility]  Inpatient Progress Note    CC:         Chest pain and shortness of breath          HPI:   This is a 69 y.o. female who I have known for close to 30 years with head innumerable admissions for chest pains, recurrent revascularization for coronary disease with multiple stents and now most recently with diastolic heart failure came to the ER for chest pain exertional fatigue.  She claims to be compliant with her medications.  Patient was seen by Dr. Riddle yesterday who documented that the patient's admission weight was 120 pounds when her dry weight is 110 pounds.  She is now on IV Lasix.      Interval history  No complaints      Review of Systems -   Constitutional: Negative for weight gain/loss; malaise, fever  Respiratory: Negative for Asthma;  cough and hemoptysis  Cardiovascular: Negative for palpitations,dizziness   Gastrointestinal: Negative for abd.pain; constipation/diarrhea;    Genitourinary: Negative for stones; hematuria; frequency hesitancy  Integumentt: Negative for rash or pruritis  Hematologic/lymphatic: Negative for blood dyscrasia; leukemia/lymphoma  Musculoskeletal: Negative for Connective tissue disease  Neurological:  Negative for Seizure   Behavioral/Psych:Negative for Bipolar disorder, Schizophrenia; Dementia  Endocrine: negative for thyroid, parathyroid disease      Intake/Output Summary (Last 24 hours) at 12/11/2024 0746  Last data filed at 12/11/2024 0258  Gross per 24 hour   Intake 1331.48 ml   Output 250 ml   Net 1081.48 ml         Physical Examination:    BP (!) 141/60   Pulse 60   Temp 98.1 °F (36.7 °C) (Oral)   Resp 18   Ht 1.524 m (5')   Wt 49.6 kg (109 lb 5.6 oz)   SpO2 95%   BMI 21.36 kg/m²   HEENT:  Face: Atraumatic, Conjunctiva: Pink; non icteric,  Mucous Memb:  Moist, No thyromegaly or Lymphadenopathy  Respiratory:  Resp Assessment: Normal, Resp Auscultation: clear   Cardiovascular:  Auscultation: nl S1 & S2, Palpation:  Nl PMI; No heaves or thrills,

## 2024-12-11 NOTE — PROGRESS NOTES
Department of Internal Medicine  Nephrology Progress Note        Reason for consultation: KOLBY      History of Present Illness: Maren Meza is a 68 yo female with a PMHx of CKD 3b/4, h/o KOLBY, HTN, afib s/p watchman, CAD, CHF, COPD, DM2, HLD. Patient presents to  ED on 12/7/2024 with complaints of chest pain and SOB. Troponins 54>46. CTA Chest abdomen is negative for acute findings. Imaging does reveal moderate to severe R renal artery disease. Patient is supposed to have an appt with vascular surgery tomorrow. Cardiology saw pt this admission and is going discuss with Dr. Allred.           Events noted . Labs reviewed   REVIEW OF SYSTEMS:  Resting , no acute complaints     Physical Exam:    VITALS:  BP (!) 141/60   Pulse 60   Temp 98.1 °F (36.7 °C) (Oral)   Resp 18   Ht 1.524 m (5')   Wt 49.6 kg (109 lb 5.6 oz)   SpO2 98%   BMI 21.36 kg/m²   24HR INTAKE/OUTPUT:    Intake/Output Summary (Last 24 hours) at 12/11/2024 1258  Last data filed at 12/11/2024 0258  Gross per 24 hour   Intake 1211.48 ml   Output 250 ml   Net 961.48 ml       Constitutional: Looks comfortable   Respiratory:  CTA  Gastrointestinal:  No  tenderness.  Normal Bowel Sounds  Cardiovascular:  S1, S2 Irregular   Edema:   +  edema    DATA:    CBC:  Lab Results   Component Value Date/Time    WBC 7.0 12/11/2024 06:15 AM    RBC 2.91 12/11/2024 06:15 AM    HGB 9.0 12/11/2024 06:15 AM    HCT 27.8 12/11/2024 06:15 AM    MCV 95.4 12/11/2024 06:15 AM    MCH 30.8 12/11/2024 06:15 AM    MCHC 32.2 12/11/2024 06:15 AM    RDW 14.9 12/11/2024 06:15 AM     12/11/2024 06:15 AM    MPV 8.7 12/11/2024 06:15 AM     CMP:  Lab Results   Component Value Date/Time     12/11/2024 06:15 AM    K 4.1 12/11/2024 06:15 AM    K 3.8 12/07/2024 08:25 PM     12/11/2024 06:15 AM    CO2 20 12/11/2024 06:15 AM    BUN 48 12/11/2024 06:15 AM    CREATININE 3.2 12/11/2024 06:15 AM    GFRAA 46 10/09/2022 06:18 AM    GFRAA 60 05/23/2013 02:56 PM    AGRATIO 1.1  12/07/2024 08:25 PM    LABGLOM 15 12/11/2024 06:15 AM    LABGLOM 49 04/22/2024 04:43 AM    GLUCOSE 125 12/11/2024 06:15 AM    CALCIUM 9.3 12/11/2024 06:15 AM    BILITOT <0.2 12/08/2024 06:15 AM    ALKPHOS 144 12/08/2024 06:15 AM    AST 40 12/08/2024 06:15 AM    ALT 27 12/08/2024 06:15 AM      Hepatic Function Panel:   Lab Results   Component Value Date/Time    ALKPHOS 144 12/08/2024 06:15 AM    ALT 27 12/08/2024 06:15 AM    AST 40 12/08/2024 06:15 AM    BILITOT <0.2 12/08/2024 06:15 AM    BILIDIR <0.1 12/08/2024 06:15 AM    IBILI see below 12/08/2024 06:15 AM      Phosphorus:   Lab Results   Component Value Date/Time    PHOS 2.8 12/11/2024 06:15 AM       ASSESSMENT:  Principal Problem (Resolved):    Acute on chronic systolic right heart failure (HCC)  Active Problems:    Paroxysmal A-fib (HCC)    Chest pain    Elevated troponin I level    Acute on chronic diastolic heart failure (HCC)    Type 2 diabetes mellitus with diabetic chronic kidney disease (HCC)    Hypertensive heart and chronic kidney disease with heart failure and stage 1 through stage 4 chronic kidney disease, or unspecified chronic kidney disease (HCC)    CKD stage 3a, GFR 45-59 ml/min (HCC)    Type 2 diabetes mellitus with mild nonproliferative diabetic retinopathy without macular edema, bilateral (HCC)    Hypertensive emergency      PLAN:  1- 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 Cr baseline ~ 1.5 mg/dL prior to this.   Supposed to follow with Dr. Chung in office (last seen 11/2023). Etiology of KOLBY likely 2/2 contrast induced nephropathy in the setting of losartan use.              - Increase Cr 2.9 ,restart IVF.               - s/p gill placement . Will need void trial pre dc .               - Hold losartan              - Avoid nephrotoxic agents              - No indication for RRT     2- Non anion gap metabolic acidosis: 2/2 KOLBY/CKD              - on  bicarb gtt. Will dc      3- Chest pain              - Management per  cardiology     4- R renal artery stenosis  Hypertension              - Pt supposed to have appt with vascular surgery tomorrow              - Cardiology following .              - Hold losartan in the setting of KOLBY              - Monitor     5- pAF s/p watchman     6- CKD-BMD              - labs reviewed       Pt updated . Case D/w  cards       Corazon Hanson MD, FACP

## 2024-12-11 NOTE — PROGRESS NOTES
[facility]  Diabetes Education   Progress Note       NAME:  Maren Meza  MEDICAL RECORD NUMBER:  [id number]  AGE: 69 y.o.   GENDER: female  : 1955  TODAY'S DATE:  2024    Subjective   Reason for Diabetes Education Evaluation and Assessment: DM Education and Self Management     Visit Type: evaluation      Maren Meza is a 69 y.o. female referred by:  [x] Nursing     Chart reviewed. Per home med list, pt prescribed the following DM medications: Novolog- inject 4 untis into the skin three times daily, and Basaglar pen- inject 8 units into the skin nightly.  - Pt is ordered Prednisone 10 mg Daily    Met with pt. Pt stated she uses insulin pens and is now out of her Basaglar (Basal) insulin. Pt stated she takes Humalog at home with her food when she remembers to take it. Pt stated she is active with CM services and Meals on Wheels with [employer]- per pt she gets Boost and one meal a day (7 meals) a week. Pt stated she lives alone and omits her insulin- both on average 10 times a week, due to forgetting to take her insulin. Pt stated she test her glucose 2-3 times a day and has testing supplies. Range prior to admission 140-240 or higher at times. Pt voiced complaints of upset stomach at home. Pt stated she usually eats meal that is provided by [employer] once a day \"if that\". Pt encouraged to take medications with a small amount of food to decrease nausea caused from medications. Pt agreeable to \"try\" this when she get homes. Pt stated she will try with vanilla pudding or applesauce since cholate messes with her stomach too.      PAST MEDICAL HISTORY        Diagnosis Date    Acid reflux     Anemia     Anxiety and depression     Arthritis     Asthma     Atrial fibrillation (HCC)     CAD (coronary artery disease) 12/3/2012    Cerebral artery occlusion with cerebral infarction (HCC)     TIA\"\"S--right sided weakness & headache    CHF (congestive heart failure) (HCC)     Chronic kidney disease--stage  III     40% kidney function    COPD (chronic obstructive pulmonary disease) (HCC)     DM2 (diabetes mellitus, type 2) (HCC)     Dysarthria     Fibromyalgia 6/7/2016    Headache(784.0) 2/19/2013    Hemisensory loss     History of blood transfusion 11/2020    pt denies having transfusion reaction    Hyperlipidemia     Hypertension     IBS (irritable bowel syndrome)     Inferior vena cava occlusion (HCC)     Keratitis     Meningioma (HCC)     MI, old     Neuropathy     Superior vena cava obstruction     Temporal arteritis (HCC) 2/24/2014    Wears glasses        PAST SURGICAL HISTORY    Past Surgical History:   Procedure Laterality Date    ABLATION OF DYSRHYTHMIC FOCUS  1999  and 11/2020    times 2    ARTERY BIOPSY Right 04/23/2014    RIGHT TEMPORAL ARTERY BIOPSY    CAROTID ARTERY SURGERY Left     clean up per pt    CATARACT REMOVAL Bilateral     CHOLECYSTECTOMY      COLONOSCOPY N/A 4/9/2021    COLONOSCOPY WITH BIOPSY performed by Gera Matthews MD at [facility] ENDOSCOPY    COLONOSCOPY N/A 10/15/2021    COLONOSCOPY performed by Gera Matthews MD at [facility] ENDOSCOPY    COLONOSCOPY N/A 7/2/2024    COLONOSCOPY POLYPECTOMY SNARE/BIOPSY performed by Marcio Castillo MD at [facility] ENDOSCOPY    CORONARY ANGIOPLASTY WITH STENT PLACEMENT  2020    HYSTERECTOMY (CERVIX STATUS UNKNOWN)      JOINT REPLACEMENT Right     KNEE ARTHROSCOPY Right     PTCA  10/2019    LAD and RCA inrtervention    TUNNELED VENOUS PORT PLACEMENT      left thigh.  SMART PORT-----Removed--total of 4 port placement and removal    UPPER GASTROINTESTINAL ENDOSCOPY N/A 7/6/2020    EGD DIAGNOSTIC ONLY performed by Jose Pickens MD at [facility] ENDOSCOPY    UPPER GASTROINTESTINAL ENDOSCOPY N/A 7/1/2024    ESOPHAGOGASTRODUODENOSCOPY BIOPSY performed by Marcio Castillo MD at [facility] ENDOSCOPY       FAMILY HISTORY    Family History   Problem Relation Age of Onset    Diabetes Mother     High Cholesterol Mother     Stroke Mother     Cancer Mother     High Blood Pressure Mother

## 2024-12-11 NOTE — PROGRESS NOTES
V2.0    [facility] Progress Note      Name:  Maren Meza /Age/Sex: 1955  (69 y.o. female)   MRN & CSN:  [id number] & [id number] Encounter Date/Time: 2024 8:57 AM EDT   Location:  [id number] PCP: Marin Cardenas MD     Attending:Humble Elias MD       Hospital Day: 5      HPI :         Subjective:     Endorses chest pain but no shortness of breath    Review of Systems:      Pertinent positives and negatives discussed in HPI    Objective:     Intake/Output Summary (Last 24 hours) at 2024 1211  Last data filed at 2024 0258  Gross per 24 hour   Intake 1211.48 ml   Output 250 ml   Net 961.48 ml      Vitals:   Vitals:    24 0304 24 0307 24 0734 24 0746   BP:  (!) 181/76 (!) 141/60    Pulse:  63 60    Resp:  18 18 18   Temp:  98.1 °F (36.7 °C) 98.1 °F (36.7 °C)    TempSrc:  Oral Oral    SpO2:  98% 95% 98%   Weight: 49.6 kg (109 lb 5.6 oz)      Height:             Physical Exam:      Physical Exam Performed:    BP (!) 141/60   Pulse 60   Temp 98.1 °F (36.7 °C) (Oral)   Resp 18   Ht 1.524 m (5')   Wt 49.6 kg (109 lb 5.6 oz)   SpO2 98%   BMI 21.36 kg/m²     General appearance: No apparent distress, appears stated age and cooperative.  HEENT: Pupils equal, round, and reactive to light. Conjunctivae/corneas clear.  Neck: Supple, with full range of motion. No jugular venous distention. Trachea midline.  Respiratory:  Normal respiratory effort. Clear to auscultation, bilaterally without Rales/Wheezes/Rhonchi.  Cardiovascular: Regular rate and rhythm with normal S1/S2 without murmurs, rubs or gallops.  Abdomen: Soft, non-tender, non-distended with normal bowel sounds.  Musculoskeletal: No clubbing, cyanosis or edema bilaterally.  Full range of motion without deformity.  Neurologic:  Neurovascularly intact without any focal sensory/motor deficits. Cranial nerves: II-XII intact, grossly non-focal.  Psychiatric: Alert and oriented, thought content appropriate,    Component Value Date/Time    LABURIN No growth at 18 to 36 hours 12/10/2024 08:47 AM     Blood Cultures:   Lab Results   Component Value Date/Time    BC No growth after 5 days of incubation. 07/28/2019 08:38 AM     Lab Results   Component Value Date/Time    BLOODCULT2 No growth after 5 days of incubation. 07/28/2019 08:38 AM     Organism:   Lab Results   Component Value Date/Time    ORG Aerococcus species 06/04/2024 01:12 AM         Assessment and Recommendations   Maren Meza is a 69 y.o. female who presents with chest pain, uncontrolled hypertension    Uncontrolled hypertension  BP better controlled, continue metoprolol, hydralazine, amlodipine    Atypical chest pain..  Appears to be chronic, recurrent  Nonobstructive CAD  Continue antiplatelets, statin, MD Jaime    KOLBY on CKD stage III  Creatinine continues to trend up, baseline 1.72, currently 3.2..  Off IV Lasix  Gentle rehydration per nephrology    Moderate to severe right renal artery stenosis  To follow with intervention cardiology outpatient  Acute on chronic diastolic heart failure-currently euvolemic    Hyperlipidemia-continue statin    Seizure disorder-continue Keppra    Acute toxic encephalopathy..  Patient was lethargic 12/10 due to medications-Elavil, Seroquel were held--will resume today    Mood disorder..  On Seroquel, Elavil    Chronic anemia-H&H stable    Diabetes mellitus type 2, poorly controlled, hemoglobin A1c 10.0 12/9  Resume Lantus and optimize basal bolus regimen, consult diabetic educator    Chronic pain syndrome including back pain-follows with pain management  COPD without exacerbation..  Chronic steroid dependence-on 10 mg prednisone      Diet ADULT DIET; Regular; No Added Salt (3-4 gm); 1500 ml     DVT Prophylaxis [] Lovenox, [x]  Heparin, [] SCDs, [] Ambulation,  [] Eliquis, [] Xarelto  [] Coumadin   Code Status Full Code   Disposition   Patient requires continued admission due to    Surrogate Decision Maker/ POA

## 2024-12-11 NOTE — PLAN OF CARE
Problem: Chronic Conditions and Co-morbidities  Goal: Patient's chronic conditions and co-morbidity symptoms are monitored and maintained or improved  Outcome: Progressing     Problem: Discharge Planning  Goal: Discharge to home or other facility with appropriate resources  Outcome: Progressing     Problem: Safety - Adult  Goal: Free from fall injury  Outcome: Progressing     Problem: Cardiovascular - Adult  Goal: Maintains optimal cardiac output and hemodynamic stability  Outcome: Progressing     Problem: Cardiovascular - Adult  Goal: Absence of cardiac dysrhythmias or at baseline  Outcome: Progressing     Problem: Neurosensory - Adult  Goal: Achieves stable or improved neurological status  Outcome: Progressing     Problem: Respiratory - Adult  Goal: Achieves optimal ventilation and oxygenation  Outcome: Progressing     Problem: Skin/Tissue Integrity - Adult  Goal: Skin integrity remains intact  Outcome: Progressing     Problem: Genitourinary - Adult  Goal: Absence of urinary retention  Outcome: Progressing     Problem: Genitourinary - Adult  Goal: Urinary catheter remains patent  Outcome: Progressing     Problem: Infection - Adult  Goal: Absence of infection at discharge  Outcome: Progressing     Problem: Metabolic/Fluid and Electrolytes - Adult  Goal: Electrolytes maintained within normal limits  Outcome: Progressing     Problem: Metabolic/Fluid and Electrolytes - Adult  Goal: Hemodynamic stability and optimal renal function maintained  Outcome: Progressing     Problem: Metabolic/Fluid and Electrolytes - Adult  Goal: Glucose maintained within prescribed range  Outcome: Progressing     Problem: Hematologic - Adult  Goal: Maintains hematologic stability  Outcome: Progressing     Problem: Pain  Goal: Verbalizes/displays adequate comfort level or baseline comfort level  Outcome: Progressing     Problem: ABCDS Injury Assessment  Goal: Absence of physical injury  Outcome: Progressing     Problem: Anxiety  Goal: Will  report anxiety at manageable levels  Description: INTERVENTIONS:  1. Administer medication as ordered  2. Teach and rehearse alternative coping skills  3. Provide emotional support with 1:1 interaction with staff  Outcome: Progressing     Problem: Coping  Goal: Pt/Family able to verbalize concerns and demonstrate effective coping strategies  Description: INTERVENTIONS:  1. Assist patient/family to identify coping skills, available support systems and cultural and spiritual values  2. Provide emotional support, including active listening and acknowledgement of concerns of patient and caregivers  3. Reduce environmental stimuli, as able  4. Instruct patient/family in relaxation techniques, as appropriate  5. Assess for spiritual pain/suffering and initiate Spiritual Care, Psychosocial Clinical Specialist consults as needed  Outcome: Progressing     Problem: Change in Body Image  Goal: Pt/Family communicate acceptance of loss or change in body image and feel psychological comfort and peace  Description: INTERVENTIONS:  1. Assess patient/family anxiety and grief process related to change in body image, loss of functional status, loss of sense of self, and forgiveness  2. Provide emotional and spiritual support  3. Provide information about the patient's health status with consideration of family and cultural values  4. Communicate willingness to discuss loss and facilitate grief process with patient/family as appropriate  5. Emphasize sustaining relationships within family system and community, or luis/spiritual traditions  6. Initiate Spiritual Care, Psychosocial Clinical Specialist consult as needed  Outcome: Progressing     Problem: Decision Making  Goal: Pt/Family able to effectively weigh alternatives and participate in decision making related to treatment and care  Description: INTERVENTIONS:  1. Determine when there are differences between patient's view, family's view, and healthcare provider's view of  condition  2. Facilitate patient and family articulation of goals for care  3. Help patient and family identify pros/cons of alternative solutions  4. Provide information as requested by patient/family  5. Respect patient/family right to receive or not to receive information  6. Serve as a liaison between patient and family and health care team  7. Initiate Consults from Ethics, Palliative Care or initiate Family Care Conference as is appropriate  Outcome: Progressing     Problem: Confusion  Goal: Confusion, delirium, dementia, or psychosis is improved or at baseline  Description: INTERVENTIONS:  1. Assess for possible contributors to thought disturbance, including medications, impaired vision or hearing, underlying metabolic abnormalities, dehydration, psychiatric diagnoses, and notify attending LIP  2. Superior high risk fall precautions, as indicated  3. Provide frequent short contacts to provide reality reorientation, refocusing and direction  4. Decrease environmental stimuli, including noise as appropriate  5. Monitor and intervene to maintain adequate nutrition, hydration, elimination, sleep and activity  6. If unable to ensure safety without constant attention obtain sitter and review sitter guidelines with assigned personnel  7. Initiate Psychosocial CNS and Spiritual Care consult, as indicated  Outcome: Progressing

## 2024-12-12 ENCOUNTER — APPOINTMENT (OUTPATIENT)
Dept: VASCULAR LAB | Age: 69
DRG: 291 | End: 2024-12-12
Attending: INTERNAL MEDICINE
Payer: COMMERCIAL

## 2024-12-12 LAB
ALBUMIN SERPL-MCNC: 3.4 G/DL (ref 3.4–5)
ANION GAP SERPL CALCULATED.3IONS-SCNC: 12 MMOL/L (ref 3–16)
BACTERIA URNS QL MICRO: ABNORMAL /HPF
BILIRUB UR QL STRIP.AUTO: NEGATIVE
BUN SERPL-MCNC: 50 MG/DL (ref 7–20)
CALCIUM SERPL-MCNC: 9.1 MG/DL (ref 8.3–10.6)
CHLORIDE SERPL-SCNC: 104 MMOL/L (ref 99–110)
CLARITY UR: ABNORMAL
CO2 SERPL-SCNC: 20 MMOL/L (ref 21–32)
COLOR UR: YELLOW
CREAT SERPL-MCNC: 2.9 MG/DL (ref 0.6–1.2)
DEPRECATED RDW RBC AUTO: 14.5 % (ref 12.4–15.4)
DEPRECATED RDW RBC AUTO: 15 % (ref 12.4–15.4)
ECHO BSA: 1.43 M2
EPI CELLS #/AREA URNS AUTO: 5 /HPF (ref 0–5)
GFR SERPLBLD CREATININE-BSD FMLA CKD-EPI: 17 ML/MIN/{1.73_M2}
GLUCOSE BLD-MCNC: 106 MG/DL (ref 70–99)
GLUCOSE BLD-MCNC: 119 MG/DL (ref 70–99)
GLUCOSE BLD-MCNC: 172 MG/DL (ref 70–99)
GLUCOSE BLD-MCNC: 234 MG/DL (ref 70–99)
GLUCOSE SERPL-MCNC: 157 MG/DL (ref 70–99)
GLUCOSE UR STRIP.AUTO-MCNC: NEGATIVE MG/DL
HCT VFR BLD AUTO: 26.4 % (ref 36–48)
HCT VFR BLD AUTO: 27 % (ref 36–48)
HGB BLD-MCNC: 8.7 G/DL (ref 12–16)
HGB BLD-MCNC: 8.9 G/DL (ref 12–16)
HGB UR QL STRIP.AUTO: ABNORMAL
HYALINE CASTS #/AREA URNS AUTO: 5 /LPF (ref 0–8)
KETONES UR STRIP.AUTO-MCNC: ABNORMAL MG/DL
LACTATE BLDV-SCNC: 1.5 MMOL/L (ref 0.4–2)
LEUKOCYTE ESTERASE UR QL STRIP.AUTO: ABNORMAL
MAGNESIUM SERPL-MCNC: 2.08 MG/DL (ref 1.8–2.4)
MCH RBC QN AUTO: 31.2 PG (ref 26–34)
MCH RBC QN AUTO: 31.5 PG (ref 26–34)
MCHC RBC AUTO-ENTMCNC: 32.7 G/DL (ref 31–36)
MCHC RBC AUTO-ENTMCNC: 33.1 G/DL (ref 31–36)
MCV RBC AUTO: 94.4 FL (ref 80–100)
MCV RBC AUTO: 96.1 FL (ref 80–100)
NITRITE UR QL STRIP.AUTO: NEGATIVE
PERFORMED ON: ABNORMAL
PH UR STRIP.AUTO: 5 [PH] (ref 5–8)
PHOSPHATE SERPL-MCNC: 2.2 MG/DL (ref 2.5–4.9)
PLATELET # BLD AUTO: 166 K/UL (ref 135–450)
PLATELET # BLD AUTO: 175 K/UL (ref 135–450)
PLATELET BLD QL SMEAR: ADEQUATE
PMV BLD AUTO: 8.7 FL (ref 5–10.5)
PMV BLD AUTO: 9.3 FL (ref 5–10.5)
POTASSIUM SERPL-SCNC: 4 MMOL/L (ref 3.5–5.1)
PROT UR STRIP.AUTO-MCNC: 100 MG/DL
RBC # BLD AUTO: 2.75 M/UL (ref 4–5.2)
RBC # BLD AUTO: 2.86 M/UL (ref 4–5.2)
RBC CLUMPS #/AREA URNS AUTO: 218 /HPF (ref 0–4)
SLIDE REVIEW: ABNORMAL
SODIUM SERPL-SCNC: 136 MMOL/L (ref 136–145)
SP GR UR STRIP.AUTO: 1.02 (ref 1–1.03)
UA COMPLETE W REFLEX CULTURE PNL UR: YES
UA DIPSTICK W REFLEX MICRO PNL UR: YES
URN SPEC COLLECT METH UR: ABNORMAL
UROBILINOGEN UR STRIP-ACNC: 0.2 E.U./DL
VAS AORTA PROX AP: 1.71 CM
VAS AORTA PROX PSV: 41.4 CM/S
VAS AORTA PROX TR: 1.67 CM
VAS LEFT KIDNEY LENGTH: 8.65 CM
VAS LEFT KIDNEY WIDTH: 4.38 CM
WBC # BLD AUTO: 7 K/UL (ref 4–11)
WBC # BLD AUTO: 8.1 K/UL (ref 4–11)
WBC #/AREA URNS AUTO: 21 /HPF (ref 0–5)

## 2024-12-12 PROCEDURE — 6360000002 HC RX W HCPCS: Performed by: HOSPITALIST

## 2024-12-12 PROCEDURE — 6370000000 HC RX 637 (ALT 250 FOR IP): Performed by: HOSPITALIST

## 2024-12-12 PROCEDURE — 83735 ASSAY OF MAGNESIUM: CPT

## 2024-12-12 PROCEDURE — 6370000000 HC RX 637 (ALT 250 FOR IP)

## 2024-12-12 PROCEDURE — 93975 VASCULAR STUDY: CPT

## 2024-12-12 PROCEDURE — 99231 SBSQ HOSP IP/OBS SF/LOW 25: CPT | Performed by: INTERNAL MEDICINE

## 2024-12-12 PROCEDURE — 93975 VASCULAR STUDY: CPT | Performed by: SURGERY

## 2024-12-12 PROCEDURE — 80069 RENAL FUNCTION PANEL: CPT

## 2024-12-12 PROCEDURE — 94760 N-INVAS EAR/PLS OXIMETRY 1: CPT

## 2024-12-12 PROCEDURE — 2580000003 HC RX 258: Performed by: REGISTERED NURSE

## 2024-12-12 PROCEDURE — 87040 BLOOD CULTURE FOR BACTERIA: CPT

## 2024-12-12 PROCEDURE — 36415 COLL VENOUS BLD VENIPUNCTURE: CPT

## 2024-12-12 PROCEDURE — 94640 AIRWAY INHALATION TREATMENT: CPT

## 2024-12-12 PROCEDURE — 2580000003 HC RX 258

## 2024-12-12 PROCEDURE — 85027 COMPLETE CBC AUTOMATED: CPT

## 2024-12-12 PROCEDURE — 6360000002 HC RX W HCPCS: Performed by: REGISTERED NURSE

## 2024-12-12 PROCEDURE — 2060000000 HC ICU INTERMEDIATE R&B

## 2024-12-12 PROCEDURE — 2500000003 HC RX 250 WO HCPCS

## 2024-12-12 PROCEDURE — 81001 URINALYSIS AUTO W/SCOPE: CPT

## 2024-12-12 PROCEDURE — 87086 URINE CULTURE/COLONY COUNT: CPT

## 2024-12-12 PROCEDURE — 6370000000 HC RX 637 (ALT 250 FOR IP): Performed by: INTERNAL MEDICINE

## 2024-12-12 PROCEDURE — 2580000003 HC RX 258: Performed by: HOSPITALIST

## 2024-12-12 PROCEDURE — 83605 ASSAY OF LACTIC ACID: CPT

## 2024-12-12 RX ORDER — OLANZAPINE 10 MG/2ML
5 INJECTION, POWDER, FOR SOLUTION INTRAMUSCULAR ONCE
Status: DISCONTINUED | OUTPATIENT
Start: 2024-12-12 | End: 2024-12-15

## 2024-12-12 RX ORDER — 0.9 % SODIUM CHLORIDE 0.9 %
250 INTRAVENOUS SOLUTION INTRAVENOUS ONCE
Status: DISCONTINUED | OUTPATIENT
Start: 2024-12-12 | End: 2024-12-16 | Stop reason: HOSPADM

## 2024-12-12 RX ADMIN — ASPIRIN 81 MG: 81 TABLET, CHEWABLE ORAL at 09:31

## 2024-12-12 RX ADMIN — METOPROLOL SUCCINATE 50 MG: 50 TABLET, EXTENDED RELEASE ORAL at 09:31

## 2024-12-12 RX ADMIN — DULOXETINE HYDROCHLORIDE 60 MG: 60 CAPSULE, DELAYED RELEASE ORAL at 09:30

## 2024-12-12 RX ADMIN — HEPARIN SODIUM 5000 UNITS: 5000 INJECTION INTRAVENOUS; SUBCUTANEOUS at 09:31

## 2024-12-12 RX ADMIN — PANTOPRAZOLE SODIUM 40 MG: 40 TABLET, DELAYED RELEASE ORAL at 05:01

## 2024-12-12 RX ADMIN — ATORVASTATIN CALCIUM 80 MG: 80 TABLET, FILM COATED ORAL at 20:56

## 2024-12-12 RX ADMIN — ONDANSETRON 4 MG: 4 TABLET, ORALLY DISINTEGRATING ORAL at 09:30

## 2024-12-12 RX ADMIN — INSULIN GLARGINE 7 UNITS: 100 INJECTION, SOLUTION SUBCUTANEOUS at 20:56

## 2024-12-12 RX ADMIN — Medication 2 PUFF: at 11:53

## 2024-12-12 RX ADMIN — ISOSORBIDE MONONITRATE 60 MG: 60 TABLET ORAL at 20:56

## 2024-12-12 RX ADMIN — MONTELUKAST 10 MG: 10 TABLET, FILM COATED ORAL at 09:31

## 2024-12-12 RX ADMIN — CALCIUM CARBONATE-VITAMIN D TAB 500 MG-200 UNIT 1 TABLET: 500-200 TAB at 09:30

## 2024-12-12 RX ADMIN — RANOLAZINE 1000 MG: 500 TABLET, FILM COATED, EXTENDED RELEASE ORAL at 09:31

## 2024-12-12 RX ADMIN — LEVETIRACETAM 500 MG: 500 TABLET, FILM COATED ORAL at 09:31

## 2024-12-12 RX ADMIN — INSULIN LISPRO 3 UNITS: 100 INJECTION, SOLUTION INTRAVENOUS; SUBCUTANEOUS at 14:00

## 2024-12-12 RX ADMIN — FLUTICASONE PROPIONATE 1 SPRAY: 50 SPRAY, METERED NASAL at 09:30

## 2024-12-12 RX ADMIN — INSULIN LISPRO 3 UNITS: 100 INJECTION, SOLUTION INTRAVENOUS; SUBCUTANEOUS at 18:38

## 2024-12-12 RX ADMIN — INSULIN LISPRO 1 UNITS: 100 INJECTION, SOLUTION INTRAVENOUS; SUBCUTANEOUS at 18:39

## 2024-12-12 RX ADMIN — PREDNISONE 10 MG: 10 TABLET ORAL at 09:31

## 2024-12-12 RX ADMIN — LEVETIRACETAM 500 MG: 500 TABLET, FILM COATED ORAL at 20:56

## 2024-12-12 RX ADMIN — HYDRALAZINE HYDROCHLORIDE 100 MG: 50 TABLET ORAL at 14:00

## 2024-12-12 RX ADMIN — ISOSORBIDE MONONITRATE 60 MG: 60 TABLET ORAL at 09:30

## 2024-12-12 RX ADMIN — METOPROLOL SUCCINATE 50 MG: 50 TABLET, EXTENDED RELEASE ORAL at 20:56

## 2024-12-12 RX ADMIN — HYDRALAZINE HYDROCHLORIDE 100 MG: 50 TABLET ORAL at 09:31

## 2024-12-12 RX ADMIN — FERROUS SULFATE TAB 325 MG (65 MG ELEMENTAL FE) 325 MG: 325 (65 FE) TAB at 09:31

## 2024-12-12 RX ADMIN — AMLODIPINE BESYLATE 5 MG: 5 TABLET ORAL at 09:31

## 2024-12-12 RX ADMIN — DIBASIC SODIUM PHOSPHATE, MONOBASIC POTASSIUM PHOSPHATE AND MONOBASIC SODIUM PHOSPHATE 2 TABLET: 852; 155; 130 TABLET ORAL at 14:00

## 2024-12-12 RX ADMIN — WATER 1000 MG: 1 INJECTION INTRAMUSCULAR; INTRAVENOUS; SUBCUTANEOUS at 01:14

## 2024-12-12 RX ADMIN — TICAGRELOR 90 MG: 90 TABLET ORAL at 20:56

## 2024-12-12 RX ADMIN — SODIUM CHLORIDE, PRESERVATIVE FREE 10 ML: 5 INJECTION INTRAVENOUS at 20:57

## 2024-12-12 RX ADMIN — TICAGRELOR 90 MG: 90 TABLET ORAL at 09:30

## 2024-12-12 RX ADMIN — RANOLAZINE 1000 MG: 500 TABLET, FILM COATED, EXTENDED RELEASE ORAL at 20:56

## 2024-12-12 RX ADMIN — INSULIN LISPRO 3 UNITS: 100 INJECTION, SOLUTION INTRAVENOUS; SUBCUTANEOUS at 09:31

## 2024-12-12 RX ADMIN — HYDRALAZINE HYDROCHLORIDE 100 MG: 50 TABLET ORAL at 20:56

## 2024-12-12 RX ADMIN — AMITRIPTYLINE HYDROCHLORIDE 40 MG: 10 TABLET, FILM COATED ORAL at 20:55

## 2024-12-12 RX ADMIN — SODIUM BICARBONATE: 84 INJECTION, SOLUTION INTRAVENOUS at 10:07

## 2024-12-12 RX ADMIN — QUETIAPINE FUMARATE 100 MG: 100 TABLET ORAL at 20:56

## 2024-12-12 RX ADMIN — HEPARIN SODIUM 5000 UNITS: 5000 INJECTION INTRAVENOUS; SUBCUTANEOUS at 20:56

## 2024-12-12 NOTE — PROGRESS NOTES
Patient now resting in bed comfortably, remains confused but much more pleasantly confused. UA resulted the same as first one taken, with WBC decreasing. Patient sodium bicarb remains running at 50 ml/hr. Bolus ordered for patient, waiting for lactic to be drawn at this time in order to give.     Electronically signed by Martha Calloway RN on 12/12/2024 at 4:11 AM

## 2024-12-12 NOTE — PROGRESS NOTES
Department of Internal Medicine  Nephrology Progress Note        Reason for consultation: KOLBY      History of Present Illness: Maren Meza is a 70 yo female with a PMHx of CKD 3b/4, h/o KOLBY, HTN, afib s/p watchman, CAD, CHF, COPD, DM2, HLD. Patient presents to  ED on 12/7/2024 with complaints of chest pain and SOB. Troponins 54>46. CTA Chest abdomen is negative for acute findings. Imaging does reveal moderate to severe R renal artery disease. Patient is supposed to have an appt with vascular surgery tomorrow. Cardiology saw pt this admission and is going discuss with Dr. Allred.           Events noted . Labs reviewed   REVIEW OF SYSTEMS:  Resting , confused today     Physical Exam:    VITALS:  BP (!) 141/59   Pulse 68   Temp 97.5 °F (36.4 °C) (Oral)   Resp 20   Ht 1.524 m (5')   Wt 50 kg (110 lb 3.7 oz)   SpO2 95%   BMI 21.53 kg/m²   24HR INTAKE/OUTPUT:    Intake/Output Summary (Last 24 hours) at 12/12/2024 1237  Last data filed at 12/12/2024 0456  Gross per 24 hour   Intake 120 ml   Output 550 ml   Net -430 ml       Constitutional: Looks comfortable   Respiratory:  CTA  Gastrointestinal:  No  tenderness.  Normal Bowel Sounds  Cardiovascular:  S1, S2 Irregular   Edema:   +  edema    DATA:    CBC:  Lab Results   Component Value Date/Time    WBC 8.1 12/12/2024 06:11 AM    RBC 2.75 12/12/2024 06:11 AM    HGB 8.7 12/12/2024 06:11 AM    HCT 26.4 12/12/2024 06:11 AM    MCV 96.1 12/12/2024 06:11 AM    MCH 31.5 12/12/2024 06:11 AM    MCHC 32.7 12/12/2024 06:11 AM    RDW 14.5 12/12/2024 06:11 AM     12/12/2024 06:11 AM    MPV 9.3 12/12/2024 06:11 AM     CMP:  Lab Results   Component Value Date/Time     12/12/2024 06:11 AM    K 4.0 12/12/2024 06:11 AM    K 3.8 12/07/2024 08:25 PM     12/12/2024 06:11 AM    CO2 20 12/12/2024 06:11 AM    BUN 50 12/12/2024 06:11 AM    CREATININE 2.9 12/12/2024 06:11 AM    GFRAA 46 10/09/2022 06:18 AM    GFRAA 60 05/23/2013 02:56 PM    AGRATIO 1.1 12/07/2024 08:25 PM     LABGLOM 17 12/12/2024 06:11 AM    LABGLOM 49 04/22/2024 04:43 AM    GLUCOSE 157 12/12/2024 06:11 AM    CALCIUM 9.1 12/12/2024 06:11 AM    BILITOT <0.2 12/08/2024 06:15 AM    ALKPHOS 144 12/08/2024 06:15 AM    AST 40 12/08/2024 06:15 AM    ALT 27 12/08/2024 06:15 AM      Hepatic Function Panel:   Lab Results   Component Value Date/Time    ALKPHOS 144 12/08/2024 06:15 AM    ALT 27 12/08/2024 06:15 AM    AST 40 12/08/2024 06:15 AM    BILITOT <0.2 12/08/2024 06:15 AM    BILIDIR <0.1 12/08/2024 06:15 AM    IBILI see below 12/08/2024 06:15 AM      Phosphorus:   Lab Results   Component Value Date/Time    PHOS 2.2 12/12/2024 06:11 AM       ASSESSMENT:  Principal Problem (Resolved):    Acute on chronic systolic right heart failure (HCC)  Active Problems:    Paroxysmal A-fib (HCC)    Chest pain    Elevated troponin I level    Acute on chronic diastolic heart failure (HCC)    Type 2 diabetes mellitus with diabetic chronic kidney disease (HCC)    Hypertensive heart and chronic kidney disease with heart failure and stage 1 through stage 4 chronic kidney disease, or unspecified chronic kidney disease (HCC)    CKD stage 3a, GFR 45-59 ml/min (HCC)    Type 2 diabetes mellitus with mild nonproliferative diabetic retinopathy without macular edema, bilateral (HCC)    Hypertensive emergency      PLAN:  1- 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 Cr baseline ~ 1.5 mg/dL prior to this.   Supposed to follow with Dr. Chung in office (last seen 11/2023). Etiology of KOLBY likely 2/2 contrast induced nephropathy in the setting of losartan use.              - improved Cr 2.9 ,Continue with IVF.               - s/p gill placement .              - Hold losartan              - Avoid nephrotoxic agents              - No indication for RRT     2- Non anion gap metabolic acidosis: 2/2 KOLBY/CKD              - resolved .      3- Chest pain              - Management per cardiology     4- R renal artery stenosis  Hypertension

## 2024-12-12 NOTE — PROGRESS NOTES
[facility]  Inpatient Progress Note    CC:         Chest pain and shortness of breath          HPI:   This is a 69 y.o. female who I have known for close to 30 years with head innumerable admissions for chest pains, recurrent revascularization for coronary disease with multiple stents and now most recently with diastolic heart failure came to the ER for chest pain exertional fatigue.  She claims to be compliant with her medications.  Patient was seen by Dr. Riddle yesterday who documented that the patient's admission weight was 120 pounds when her dry weight is 110 pounds.  She is now on IV Lasix.      Interval history  No complaints      Review of Systems -   Constitutional: Negative for weight gain/loss; malaise, fever  Respiratory: Negative for Asthma;  cough and hemoptysis  Cardiovascular: Negative for palpitations,dizziness   Gastrointestinal: Negative for abd.pain; constipation/diarrhea;    Genitourinary: Negative for stones; hematuria; frequency hesitancy  Integumentt: Negative for rash or pruritis  Hematologic/lymphatic: Negative for blood dyscrasia; leukemia/lymphoma  Musculoskeletal: Negative for Connective tissue disease  Neurological:  Negative for Seizure   Behavioral/Psych:Negative for Bipolar disorder, Schizophrenia; Dementia  Endocrine: negative for thyroid, parathyroid disease      Intake/Output Summary (Last 24 hours) at 12/12/2024 0802  Last data filed at 12/12/2024 0456  Gross per 24 hour   Intake 240 ml   Output 550 ml   Net -310 ml         Physical Examination:    BP (!) 141/59   Pulse 59   Temp 97.5 °F (36.4 °C) (Oral)   Resp 18   Ht 1.524 m (5')   Wt 50 kg (110 lb 3.7 oz)   SpO2 94%   BMI 21.53 kg/m²   HEENT:  Face: Atraumatic, Conjunctiva: Pink; non icteric,  Mucous Memb:  Moist, No thyromegaly or Lymphadenopathy  Respiratory:  Resp Assessment: Normal, Resp Auscultation: clear   Cardiovascular:  Auscultation: nl S1 & S2, Palpation:  Nl PMI; No heaves or thrills, JVP:   500-5 MG-MCG tablet 1 tablet, 1 tablet, Oral, Daily with breakfast  DULoxetine (CYMBALTA) extended release capsule 60 mg, 60 mg, Oral, Daily  ferrous sulfate (IRON 325) tablet 325 mg, 325 mg, Oral, Daily with breakfast  fluticasone (FLONASE) 50 MCG/ACT nasal spray 1 spray, 1 spray, Each Nostril, Daily  budesonide-formoterol (SYMBICORT) 80-4.5 MCG/ACT inhaler 2 puff, 2 puff, Inhalation, BID RT  isosorbide mononitrate (IMDUR) extended release tablet 60 mg, 60 mg, Oral, BID  levETIRAcetam (KEPPRA) tablet 500 mg, 500 mg, Oral, BID  linaclotide (LINZESS) capsule 290 mcg, 290 mcg, Oral, Once per day on Monday Thursday  metoprolol succinate (TOPROL XL) extended release tablet 50 mg, 50 mg, Oral, BID  montelukast (SINGULAIR) tablet 10 mg, 10 mg, Oral, Daily  pantoprazole (PROTONIX) tablet 40 mg, 40 mg, Oral, QAM AC  predniSONE (DELTASONE) tablet 10 mg, 10 mg, Oral, Daily  QUEtiapine (SEROQUEL) tablet 100 mg, 100 mg, Oral, Nightly  ranolazine (RANEXA) extended release tablet 1,000 mg, 1,000 mg, Oral, BID  ticagrelor (BRILINTA) tablet 90 mg, 90 mg, Oral, BID  [Held by provider] traMADol (ULTRAM) tablet 50 mg, 50 mg, Oral, Q6H PRN  glucose chewable tablet 16 g, 4 tablet, Oral, PRN  dextrose bolus 10% 125 mL, 125 mL, IntraVENous, PRN **OR** dextrose bolus 10% 250 mL, 250 mL, IntraVENous, PRN  glucagon injection 1 mg, 1 mg, SubCUTAneous, PRN  dextrose 10 % infusion, , IntraVENous, Continuous PRN  dextrose 10 % infusion, , IntraVENous, Continuous PRN  hydrALAZINE (APRESOLINE) injection 10 mg, 10 mg, IntraVENous, Q6H PRN         Labs:   Recent Labs     12/11/24  0615 12/12/24  0611   WBC 7.0 8.1   HGB 9.0* 8.7*   HCT 27.8* 26.4*    166     Recent Labs     12/11/24  0615 12/12/24  0611    136   K 4.1 4.0   CO2 20* 20*   BUN 48* 50*   CREATININE 3.2* 2.9*   LABGLOM 15* 17*     No results for input(s): \"BNP\" in the last 72 hours.  No results for input(s): \"PROTIME\", \"INR\" in the last 72 hours.    No results for input(s):  Imdur  Increase hydralazine to 100 3 times daily and add amlodipine    CKD  Creatinine 2.9, improved with IV fluids  Received contrast this admission and therefore has contrast nephropathy  Nephrology following      Will sign off  Thank you for the consult    JULIA Cardenas M.D  12/12/2024

## 2024-12-12 NOTE — PROGRESS NOTES
This nurse called into patient room by another nurse stating that patient woke up, attempting to stand on own and almost pulling out IV. On arrival patient incredibly agitated and confused. Off from baseline earlier in shift. Night time Seroquel was given tonight. Patient unsure of where she was and of situation, very impulsive. Perfect serve message sent to overnight. In message, explained previous UA that had been done earlier in admission. Orders received for labs, blood cultures, a repeat UA, Zyprexa, and bolus. Zyprexa not given due to patient being more calm and cooperative. Patient continuing with confusion at this time. Proper protocol followed to obtain UA.    Electronically signed by Martha Calloway RN

## 2024-12-12 NOTE — PLAN OF CARE
Problem: Chronic Conditions and Co-morbidities  Goal: Patient's chronic conditions and co-morbidity symptoms are monitored and maintained or improved  Recent Flowsheet Documentation  Taken 12/11/2024 2049 by Martha Calloway RN  Care Plan - Patient's Chronic Conditions and Co-Morbidity Symptoms are Monitored and Maintained or Improved: Monitor and assess patient's chronic conditions and comorbid symptoms for stability, deterioration, or improvement  12/11/2024 1444 by Kali Sanchez RN  Outcome: Progressing     Problem: Discharge Planning  Goal: Discharge to home or other facility with appropriate resources  12/12/2024 0416 by Martha Calloway RN  Outcome: Progressing  Flowsheets (Taken 12/11/2024 2049)  Discharge to home or other facility with appropriate resources:   Identify barriers to discharge with patient and caregiver   Identify discharge learning needs (meds, wound care, etc)  12/11/2024 1444 by Kali Sanchez RN  Outcome: Progressing     Problem: Safety - Adult  Goal: Free from fall injury  12/12/2024 0416 by Martha Calloway RN  Outcome: Progressing  12/11/2024 1444 by Kali Sanchez RN  Outcome: Progressing     Problem: Cardiovascular - Adult  Goal: Maintains optimal cardiac output and hemodynamic stability  12/12/2024 0416 by Martha Calloway RN  Outcome: Progressing  12/11/2024 1444 by Kali Sanchez RN  Outcome: Progressing  Goal: Absence of cardiac dysrhythmias or at baseline  12/12/2024 0416 by Martha Calloway RN  Outcome: Progressing  12/11/2024 1444 by Kali Sanchez RN  Outcome: Progressing     Problem: Pain  Goal: Verbalizes/displays adequate comfort level or baseline comfort level  12/12/2024 0416 by Martha Calloway RN  Outcome: Progressing  12/11/2024 1444 by Kali Sanchez RN  Outcome: Progressing     Problem: ABCDS Injury Assessment  Goal: Absence of physical injury  12/11/2024 1444 by Kali Sanchez RN  Outcome: Progressing      Problem: Neurosensory - Adult  Goal: Achieves stable or improved neurological status  12/12/2024 0416 by Martha Calloway RN  Outcome: Progressing  12/11/2024 1444 by Kali Sanchez RN  Outcome: Progressing     Problem: Respiratory - Adult  Goal: Achieves optimal ventilation and oxygenation  12/12/2024 0416 by Martha Calloway RN  Outcome: Progressing  12/11/2024 1444 by Kali Sanchez RN  Outcome: Progressing     Problem: Skin/Tissue Integrity - Adult  Goal: Skin integrity remains intact  12/12/2024 0416 by Martha Calloway RN  Outcome: Progressing  12/11/2024 1444 by Kali Sanchez RN  Outcome: Progressing     Problem: Genitourinary - Adult  Goal: Absence of urinary retention  12/12/2024 0416 by Martha Calloway RN  Outcome: Progressing  12/11/2024 1444 by Kali Sanchez RN  Outcome: Progressing  Goal: Urinary catheter remains patent  12/12/2024 0416 by Martha Calloway RN  Outcome: Progressing  12/11/2024 1444 by Kali Sanchez RN  Outcome: Progressing     Problem: Infection - Adult  Goal: Absence of infection at discharge  12/12/2024 0416 by Martha Calloway RN  Outcome: Progressing  Flowsheets (Taken 12/11/2024 2049)  Absence of infection at discharge:   Assess and monitor for signs and symptoms of infection   Monitor lab/diagnostic results   Monitor all insertion sites i.e., indwelling lines, tubes and drains  12/11/2024 1444 by Kali Sanchez RN  Outcome: Progressing     Problem: Metabolic/Fluid and Electrolytes - Adult  Goal: Electrolytes maintained within normal limits  12/12/2024 0416 by Martha Calloway RN  Outcome: Progressing  Flowsheets (Taken 12/11/2024 2049)  Electrolytes maintained within normal limits: Monitor labs and assess patient for signs and symptoms of electrolyte imbalances  12/11/2024 1444 by Kali Sanchez RN  Outcome: Progressing  Goal: Hemodynamic stability and optimal renal function maintained  12/12/2024 0416 by  Martha Calloway RN  Outcome: Progressing  Flowsheets (Taken 12/11/2024 2049)  Hemodynamic stability and optimal renal function maintained: Monitor labs and assess for signs and symptoms of volume excess or deficit  12/11/2024 1444 by Kali Sanchez RN  Outcome: Progressing  Goal: Glucose maintained within prescribed range  12/12/2024 0416 by Martha Calloway RN  Outcome: Progressing  Flowsheets (Taken 12/11/2024 2049)  Glucose maintained within prescribed range:   Monitor blood glucose as ordered   Assess for signs and symptoms of hyperglycemia and hypoglycemia  12/11/2024 1444 by Kali Sanchez RN  Outcome: Progressing     Problem: Hematologic - Adult  Goal: Maintains hematologic stability  12/12/2024 0416 by Martha Calloway RN  Outcome: Progressing  Flowsheets (Taken 12/11/2024 2049)  Maintains hematologic stability: Assess for signs and symptoms of bleeding or hemorrhage  12/11/2024 1444 by Kali Sanchez RN  Outcome: Progressing     Problem: Anxiety  Goal: Will report anxiety at manageable levels  Description: INTERVENTIONS:  1. Administer medication as ordered  2. Teach and rehearse alternative coping skills  3. Provide emotional support with 1:1 interaction with staff  12/12/2024 0416 by Martha Calloway RN  Outcome: Progressing  12/11/2024 1444 by Kali Sanchez RN  Outcome: Progressing     Problem: Coping  Goal: Pt/Family able to verbalize concerns and demonstrate effective coping strategies  Description: INTERVENTIONS:  1. Assist patient/family to identify coping skills, available support systems and cultural and spiritual values  2. Provide emotional support, including active listening and acknowledgement of concerns of patient and caregivers  3. Reduce environmental stimuli, as able  4. Instruct patient/family in relaxation techniques, as appropriate  5. Assess for spiritual pain/suffering and initiate Spiritual Care, Psychosocial Clinical Specialist consults as  needed  12/12/2024 0416 by Martha Calloway RN  Outcome: Progressing  12/11/2024 1444 by Kali Sanchez RN  Outcome: Progressing     Problem: Change in Body Image  Goal: Pt/Family communicate acceptance of loss or change in body image and feel psychological comfort and peace  Description: INTERVENTIONS:  1. Assess patient/family anxiety and grief process related to change in body image, loss of functional status, loss of sense of self, and forgiveness  2. Provide emotional and spiritual support  3. Provide information about the patient's health status with consideration of family and cultural values  4. Communicate willingness to discuss loss and facilitate grief process with patient/family as appropriate  5. Emphasize sustaining relationships within family system and community, or luis/spiritual traditions  6. Initiate Spiritual Care, Psychosocial Clinical Specialist consult as needed  12/12/2024 0416 by Martha Calloway RN  Outcome: Progressing  12/11/2024 1444 by Kali Sacnhez RN  Outcome: Progressing     Problem: Decision Making  Goal: Pt/Family able to effectively weigh alternatives and participate in decision making related to treatment and care  Description: INTERVENTIONS:  1. Determine when there are differences between patient's view, family's view, and healthcare provider's view of condition  2. Facilitate patient and family articulation of goals for care  3. Help patient and family identify pros/cons of alternative solutions  4. Provide information as requested by patient/family  5. Respect patient/family right to receive or not to receive information  6. Serve as a liaison between patient and family and health care team  7. Initiate Consults from Ethics, Palliative Care or initiate Family Care Conference as is appropriate  12/12/2024 0416 by Martha Calloway RN  Outcome: Progressing  12/11/2024 1444 by Kali Sanchez RN  Outcome: Progressing     Problem: Confusion  Goal:

## 2024-12-12 NOTE — PROGRESS NOTES
V2.0    [facility] Progress Note      Name:  Maren Meza /Age/Sex: 1955  (69 y.o. female)   MRN & CSN:  [id number] & [id number] Encounter Date/Time: 2024 8:57 AM EDT   Location:  [id number] PCP: Marin Cardenas MD     Attending:Humble Elias MD       Hospital Day: 6      HPI :         Subjective:     Patient had an episode of confusion/agitation last night and required a dose of Zyprexa, she is much better this morning    Review of Systems:      Pertinent positives and negatives discussed in HPI    Objective:     Intake/Output Summary (Last 24 hours) at 2024 0902  Last data filed at 2024 0456  Gross per 24 hour   Intake 240 ml   Output 550 ml   Net -310 ml      Vitals:   Vitals:    24 2331 24 0536 24 0538 24 0745   BP: 95/67 (!) 133/53  (!) 141/59   Pulse: 65 56  59   Resp: 16 20  18   Temp: 97.8 °F (36.6 °C) 97.4 °F (36.3 °C)  97.5 °F (36.4 °C)   TempSrc: Oral Oral  Oral   SpO2: 99% 97%  94%   Weight:   50 kg (110 lb 3.7 oz)    Height:             Physical Exam:      Physical Exam Performed:    BP (!) 141/59   Pulse 59   Temp 97.5 °F (36.4 °C) (Oral)   Resp 18   Ht 1.524 m (5')   Wt 50 kg (110 lb 3.7 oz)   SpO2 94%   BMI 21.53 kg/m²     General appearance: No apparent distress, appears stated age and cooperative.  HEENT: Pupils equal, round, and reactive to light. Conjunctivae/corneas clear.  Neck: Supple, with full range of motion. No jugular venous distention. Trachea midline.  Respiratory:  Normal respiratory effort. Clear to auscultation, bilaterally without Rales/Wheezes/Rhonchi.  Cardiovascular: Regular rate and rhythm with normal S1/S2 without murmurs, rubs or gallops.  Abdomen: Soft, non-tender, non-distended with normal bowel sounds.  Musculoskeletal: No clubbing, cyanosis or edema bilaterally.  Full range of motion without deformity.  Neurologic:  Neurovascularly intact without any focal sensory/motor deficits. Cranial nerves: II-XII  02:38 AM    UROBILINOGEN 0.2 12/12/2024 02:38 AM    BILIRUBINUR Negative 12/12/2024 02:38 AM    BLOODU SMALL 12/12/2024 02:38 AM    GLUCOSEU Negative 12/12/2024 02:38 AM    GLUCOSEU NEGATIVE 05/14/2012 03:29 PM    KETUA TRACE 12/12/2024 02:38 AM    AMORPHOUS 2+ 08/13/2024 02:01 PM     Urine Cultures:   Lab Results   Component Value Date/Time    LABURIN No growth at 18 to 36 hours 12/10/2024 08:47 AM     Blood Cultures:   Lab Results   Component Value Date/Time    BC No growth after 5 days of incubation. 07/28/2019 08:38 AM     Lab Results   Component Value Date/Time    BLOODCULT2 No growth after 5 days of incubation. 07/28/2019 08:38 AM     Organism:   Lab Results   Component Value Date/Time    ORG Aerococcus species 06/04/2024 01:12 AM         Assessment and Recommendations   Maren Meza is a 69 y.o. female who presents with chest pain, uncontrolled hypertension    Uncontrolled hypertension  BP better controlled, continue metoprolol, hydralazine, amlodipine    Atypical chest pain..  Appears to be chronic, recurrent  Nonobstructive CAD  Continue antiplatelets, statin, MD Jaime    KOLBY on CKD stage III  Creatinine continues to trend up, baseline 1.7-2, creatinine peaked at 3.2, down to 2.9..  Off IV Lasix  On IV fluids managed by nephrology  Continue to monitor renal function        Moderate to severe right renal artery stenosis  To follow with intervention cardiology outpatient  Acute on chronic diastolic heart failure-currently euvolemic    Hyperlipidemia-continue statin    Seizure disorder-continue Keppra    Acute toxic/metabolic encephalopathy with delirium..  Patient was lethargic 12/10 Elavil, Seroquel were held and resumed 12/11 once she had improved-UA 12/10 showed significant pyuria but with no growth on culture-repeat UA collected overnight and culture sent-she was again confused and agitated overnight and received dose of Zyprexa..  Currently improving    Mood disorder..  On Seroquel, Elavil    Chronic  anemia-H&H stable    Diabetes mellitus type 2, poorly controlled, hemoglobin A1c 10.0 12/9  Resume Lantus and optimize basal bolus regimen, consult diabetic educator    Chronic pain syndrome including back pain-follows with pain management  COPD without exacerbation..  Chronic steroid dependence-on 10 mg prednisone      Disposition..  Home likely in 24 hours pending improvement in renal function and if no further episodes of confusion or agitation      Diet ADULT DIET; Regular; 4 carb choices (60 gm/meal); No Added Salt (3-4 gm); 1500 ml     DVT Prophylaxis [] Lovenox, [x]  Heparin, [] SCDs, [] Ambulation,  [] Eliquis, [] Xarelto  [] Coumadin   Code Status Full Code   Disposition   Patient requires continued admission due to    Surrogate Decision Maker/ POA      Personally reviewed Lab Studies and Imaging        Medical Decision Making:  The following items were considered in medical decision making:  Discussion of patient care with other providers  Reviewed clinical lab tests  Reviewed radiology tests  Reviewed other diagnostic tests/interventions  Independent review of radiologic images  Microbiology cultures and other micro tests reviewed        Electronically signed by Humble Elias MD on 12/12/2024 at 9:02 AM  Comment: Please note this report has been produced using speech recognition software and may contain errors related to that system including errors in grammar, punctuation, and spelling, as well as words and phrases that may be inappropriate. If there are any questions or concerns, please feel free to contact the dictating provider for clarification.

## 2024-12-12 NOTE — PLAN OF CARE
Problem: Chronic Conditions and Co-morbidities  Goal: Patient's chronic conditions and co-morbidity symptoms are monitored and maintained or improved  Outcome: Progressing     Problem: Discharge Planning  Goal: Discharge to home or other facility with appropriate resources  12/12/2024 1103 by Kali Sanchez RN  Outcome: Progressing     Problem: Safety - Adult  Goal: Free from fall injury  12/12/2024 1103 by Kali Sanchez RN  Outcome: Progressing     Problem: Cardiovascular - Adult  Goal: Maintains optimal cardiac output and hemodynamic stability  12/12/2024 1103 by Kali Sanchez RN  Outcome: Progressing     Problem: Cardiovascular - Adult  Goal: Absence of cardiac dysrhythmias or at baseline  12/12/2024 1103 by Kali Sanchez RN  Outcome: Progressing     Problem: Neurosensory - Adult  Goal: Achieves stable or improved neurological status  12/12/2024 1103 by Kali Sanchez RN  Outcome: Progressing     Problem: Respiratory - Adult  Goal: Achieves optimal ventilation and oxygenation  12/12/2024 1103 by Kali Sanchez RN  Outcome: Progressing     Problem: Skin/Tissue Integrity - Adult  Goal: Skin integrity remains intact  12/12/2024 1103 by Kali Sanchez RN  Outcome: Progressing     Problem: Genitourinary - Adult  Goal: Absence of urinary retention  12/12/2024 1103 by Kali Sanchez RN  Outcome: Progressing     Problem: Genitourinary - Adult  Goal: Urinary catheter remains patent  12/12/2024 1103 by Kali Sanchez RN  Outcome: Progressing     Problem: Infection - Adult  Goal: Absence of infection at discharge  12/12/2024 1103 by Kali Sanchez RN  Outcome: Progressing     Problem: Metabolic/Fluid and Electrolytes - Adult  Goal: Electrolytes maintained within normal limits  12/12/2024 1103 by Kali Sanchez RN  Outcome: Progressing     Problem: Metabolic/Fluid and Electrolytes - Adult  Goal: Hemodynamic stability and optimal renal function maintained  12/12/2024 1103 by Kali Sanchez RN  Outcome:  Progressing     Problem: Metabolic/Fluid and Electrolytes - Adult  Goal: Glucose maintained within prescribed range  12/12/2024 1103 by Kali Sanchez RN  Outcome: Progressing     Problem: Hematologic - Adult  Goal: Maintains hematologic stability  12/12/2024 1103 by Kali Sanchez RN  Outcome: Progressing     Problem: Pain  Goal: Verbalizes/displays adequate comfort level or baseline comfort level  12/12/2024 1103 by Kali Sanchez RN  Outcome: Progressing     Problem: ABCDS Injury Assessment  Goal: Absence of physical injury  Outcome: Progressing     Problem: Anxiety  Goal: Will report anxiety at manageable levels  Description: INTERVENTIONS:  1. Administer medication as ordered  2. Teach and rehearse alternative coping skills  3. Provide emotional support with 1:1 interaction with staff  12/12/2024 1103 by Kali Sanchez RN  Outcome: Progressing     Problem: Coping  Goal: Pt/Family able to verbalize concerns and demonstrate effective coping strategies  Description: INTERVENTIONS:  1. Assist patient/family to identify coping skills, available support systems and cultural and spiritual values  2. Provide emotional support, including active listening and acknowledgement of concerns of patient and caregivers  3. Reduce environmental stimuli, as able  4. Instruct patient/family in relaxation techniques, as appropriate  5. Assess for spiritual pain/suffering and initiate Spiritual Care, Psychosocial Clinical Specialist consults as needed  12/12/2024 1103 by Kali Sanchez RN  Outcome: Progressing     Problem: Change in Body Image  Goal: Pt/Family communicate acceptance of loss or change in body image and feel psychological comfort and peace  Description: INTERVENTIONS:  1. Assess patient/family anxiety and grief process related to change in body image, loss of functional status, loss of sense of self, and forgiveness  2. Provide emotional and spiritual support  3. Provide information about the patient's health  status with consideration of family and cultural values  4. Communicate willingness to discuss loss and facilitate grief process with patient/family as appropriate  5. Emphasize sustaining relationships within family system and community, or luis/spiritual traditions  6. Initiate Spiritual Care, Psychosocial Clinical Specialist consult as needed  12/12/2024 1103 by Kali Sanchez RN  Outcome: Progressing

## 2024-12-12 NOTE — PROGRESS NOTES
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 Cr baseline ~ 1.5 mg/dL prior to this. Supposed to follow with Dr. Chung in office (last seen 11/2023).     HPI: Maren Meza is a 68 yo female with a PMHx of CKD 3b/4, h/o KOLBY, HTN, afib s/p watchman, CAD, CHF, COPD, DM2, HLD. Patient presents to  ED on 12/7/2024 with complaints of chest pain and SOB. Troponins 54>46. CTA Chest abdomen is negative for acute findings. Imaging does reveal moderate to severe R renal artery disease. Patient is supposed to have an appt with vascular surgery tomorrow. Cardiology saw pt this admission and is going discuss with Dr. Allred.     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 Baseline Cr has more recently been ~ 1.7-2.0 mg/dL. Cr upon admission was 2.2 mg/dL. Today, Cr is 2.9 mg/dL. Received IV contrast on 12/7/2024. Taking losartan (received doses 12/8 and 12/9). Received 40 mg IVP lasix on 12/8. Denies N/V/D. Endorses some urinary hesitancy. Denies NSAID use.     Subjective:    The patient has been seen and examined. Labs and chart reviewed. Resting in bed. On RA. Cr is starting to trend down. Pt more drowsy this morning and somewhat confused.     There were not complications overnight.    Patient review of systems: Denies SOB       Allergies:  Allergies   Allergen Reactions    Bee Venom Shortness Of Breath        Scheduled Meds:   cefTRIAXone (ROCEPHIN) IV  1,000 mg IntraVENous Q24H    OLANZapine  5 mg IntraMUSCular Once    sodium chloride  250 mL IntraVENous Once    insulin glargine  0.15 Units/kg SubCUTAneous Nightly    insulin lispro  3 Units SubCUTAneous TID WC    insulin lispro  0-4 Units SubCUTAneous 4x Daily AC & HS    heparin (porcine)  5,000 Units SubCUTAneous BID    hydrALAZINE  100 mg Oral TID    amLODIPine  5 mg Oral Daily    sodium chloride flush  5-40 mL IntraVENous 2 times per day    aspirin  81 mg Oral Daily     atraumatic  Chest: diminished to auscultation, no intercostal retractions  CVS: RRR, no murmur, no rub  Abdomen: soft, non tender  Extremities: no edema, no cyanosis.  Psych: seems confused    LAB DATA:    CBC:   Lab Results   Component Value Date/Time    WBC 8.1 12/12/2024 06:11 AM    RBC 2.75 12/12/2024 06:11 AM    HGB 8.7 12/12/2024 06:11 AM    HCT 26.4 12/12/2024 06:11 AM    MCV 96.1 12/12/2024 06:11 AM    MCH 31.5 12/12/2024 06:11 AM    MCHC 32.7 12/12/2024 06:11 AM    RDW 14.5 12/12/2024 06:11 AM     12/12/2024 06:11 AM    MPV 9.3 12/12/2024 06:11 AM     BMP:    Lab Results   Component Value Date/Time     12/12/2024 06:11 AM    K 4.0 12/12/2024 06:11 AM    K 3.8 12/07/2024 08:25 PM     12/12/2024 06:11 AM    CO2 20 12/12/2024 06:11 AM    BUN 50 12/12/2024 06:11 AM    CREATININE 2.9 12/12/2024 06:11 AM    CALCIUM 9.1 12/12/2024 06:11 AM    GFRAA 46 10/09/2022 06:18 AM    GFRAA 60 05/23/2013 02:56 PM    LABGLOM 17 12/12/2024 06:11 AM    LABGLOM 49 04/22/2024 04:43 AM    GLUCOSE 157 12/12/2024 06:11 AM     Ionized Calcium:  No components found for: \"IONCA\"  Magnesium:    Lab Results   Component Value Date/Time    MG 2.08 12/12/2024 06:11 AM     Phosphorus:    Lab Results   Component Value Date/Time    PHOS 2.2 12/12/2024 06:11 AM     U/A:    Lab Results   Component Value Date/Time    COLORU Yellow 12/12/2024 02:38 AM    PHUR 5.0 12/12/2024 02:38 AM    PHUR 5.5 04/05/2024 11:35 AM    LABCAST 20-40 Hyaline 07/06/2017 10:42 PM    WBCUA 21 12/12/2024 02:38 AM    RBCUA 218 12/12/2024 02:38 AM    MUCUS 1+ 05/23/2013 01:27 PM    YEAST Rare Yeast 05/14/2012 03:29 PM    BACTERIA None Seen 12/12/2024 02:38 AM    CLARITYU CLOUDY 12/12/2024 02:38 AM    LEUKOCYTESUR MODERATE 12/12/2024 02:38 AM    UROBILINOGEN 0.2 12/12/2024 02:38 AM    BILIRUBINUR Negative 12/12/2024 02:38 AM    BLOODU SMALL 12/12/2024 02:38 AM    GLUCOSEU Negative 12/12/2024 02:38 AM    GLUCOSEU NEGATIVE 05/14/2012 03:29 PM    AMORPHOUS  2+ 08/13/2024 02:01 PM         IMPRESSION/RECOMMENDATIONS:      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 Cr baseline ~ 1.5 mg/dL prior to this. Supposed to follow with Dr. Chung in office (last seen 11/2023). Etiology of KOLBY likely 2/2 contrast induced nephropathy in the setting of losartan use.              - Cr 2.2>2.0>2.9>2.9>3.2>2.9 mg/dL -- ? ATN              - UA noted -- 4 hyaline casts; suggestive of UTI. FeUrea 16.1%.               - Saez catheter was placed 12/9 for urinary retention -- maintain for now since pt is somewhat altered today               - Continue to hold losartan              - Continue gentle IVF              - Avoid hypotension and nephrotoxic agents              - No indication for RRT    2. ?UTI   - UA suggestive of UTI   - Urine cx with NGTD   - On rocephin    - Management per primary     3. Non anion gap metabolic acidosis: 2/2 KOLBY/CKD              - Bicarb gtt     4. Chest pain              - Management per cardiology      5. R renal artery stenosis  Hypertension   - Renal arterial duplex (12/12/2024): non-diagnostic d/t patient movement.               - Supposed to have upcoming appt with vascular surgery              - Hold losartan in the setting of KOLBY              - Cardiology managing HTN     6. pAF s/p watchman     7. CKD-BMD              - Phosphorus low -- replacement ordered      Will discuss with nephrology attending physician, Dr. Hanson.  See attestation for additional recommendations.    Rain Monge, CYRUS - CNP

## 2024-12-13 LAB
ALBUMIN SERPL-MCNC: 3.5 G/DL (ref 3.4–5)
ANION GAP SERPL CALCULATED.3IONS-SCNC: 16 MMOL/L (ref 3–16)
BACTERIA UR CULT: NORMAL
BUN SERPL-MCNC: 42 MG/DL (ref 7–20)
CALCIUM SERPL-MCNC: 9.6 MG/DL (ref 8.3–10.6)
CHLORIDE SERPL-SCNC: 103 MMOL/L (ref 99–110)
CO2 SERPL-SCNC: 20 MMOL/L (ref 21–32)
CREAT SERPL-MCNC: 2.5 MG/DL (ref 0.6–1.2)
GFR SERPLBLD CREATININE-BSD FMLA CKD-EPI: 20 ML/MIN/{1.73_M2}
GLUCOSE BLD-MCNC: 133 MG/DL (ref 70–99)
GLUCOSE BLD-MCNC: 140 MG/DL (ref 70–99)
GLUCOSE BLD-MCNC: 179 MG/DL (ref 70–99)
GLUCOSE BLD-MCNC: 69 MG/DL (ref 70–99)
GLUCOSE SERPL-MCNC: 58 MG/DL (ref 70–99)
NT-PROBNP SERPL-MCNC: 7674 PG/ML (ref 0–124)
PERFORMED ON: ABNORMAL
PHOSPHATE SERPL-MCNC: 2.8 MG/DL (ref 2.5–4.9)
POTASSIUM SERPL-SCNC: 3.8 MMOL/L (ref 3.5–5.1)
SODIUM SERPL-SCNC: 139 MMOL/L (ref 136–145)

## 2024-12-13 PROCEDURE — 6370000000 HC RX 637 (ALT 250 FOR IP): Performed by: HOSPITALIST

## 2024-12-13 PROCEDURE — 80069 RENAL FUNCTION PANEL: CPT

## 2024-12-13 PROCEDURE — 94640 AIRWAY INHALATION TREATMENT: CPT

## 2024-12-13 PROCEDURE — 6370000000 HC RX 637 (ALT 250 FOR IP): Performed by: INTERNAL MEDICINE

## 2024-12-13 PROCEDURE — 2580000003 HC RX 258: Performed by: REGISTERED NURSE

## 2024-12-13 PROCEDURE — 2580000003 HC RX 258

## 2024-12-13 PROCEDURE — 51798 US URINE CAPACITY MEASURE: CPT

## 2024-12-13 PROCEDURE — 94760 N-INVAS EAR/PLS OXIMETRY 1: CPT

## 2024-12-13 PROCEDURE — 2500000003 HC RX 250 WO HCPCS

## 2024-12-13 PROCEDURE — 6360000002 HC RX W HCPCS: Performed by: HOSPITALIST

## 2024-12-13 PROCEDURE — 6360000002 HC RX W HCPCS: Performed by: REGISTERED NURSE

## 2024-12-13 PROCEDURE — 36415 COLL VENOUS BLD VENIPUNCTURE: CPT

## 2024-12-13 PROCEDURE — 83880 ASSAY OF NATRIURETIC PEPTIDE: CPT

## 2024-12-13 PROCEDURE — 6370000000 HC RX 637 (ALT 250 FOR IP): Performed by: STUDENT IN AN ORGANIZED HEALTH CARE EDUCATION/TRAINING PROGRAM

## 2024-12-13 PROCEDURE — 2580000003 HC RX 258: Performed by: HOSPITALIST

## 2024-12-13 PROCEDURE — 2060000000 HC ICU INTERMEDIATE R&B

## 2024-12-13 RX ORDER — INSULIN LISPRO 100 [IU]/ML
2 INJECTION, SOLUTION INTRAVENOUS; SUBCUTANEOUS
Status: DISCONTINUED | OUTPATIENT
Start: 2024-12-13 | End: 2024-12-15

## 2024-12-13 RX ADMIN — RANOLAZINE 1000 MG: 500 TABLET, FILM COATED, EXTENDED RELEASE ORAL at 08:30

## 2024-12-13 RX ADMIN — FERROUS SULFATE TAB 325 MG (65 MG ELEMENTAL FE) 325 MG: 325 (65 FE) TAB at 08:31

## 2024-12-13 RX ADMIN — SODIUM CHLORIDE, PRESERVATIVE FREE 10 ML: 5 INJECTION INTRAVENOUS at 08:32

## 2024-12-13 RX ADMIN — PREDNISONE 10 MG: 10 TABLET ORAL at 08:31

## 2024-12-13 RX ADMIN — CALCIUM CARBONATE-VITAMIN D TAB 500 MG-200 UNIT 1 TABLET: 500-200 TAB at 08:31

## 2024-12-13 RX ADMIN — LEVETIRACETAM 500 MG: 500 TABLET, FILM COATED ORAL at 21:19

## 2024-12-13 RX ADMIN — TICAGRELOR 90 MG: 90 TABLET ORAL at 21:19

## 2024-12-13 RX ADMIN — INSULIN LISPRO 2 UNITS: 100 INJECTION, SOLUTION INTRAVENOUS; SUBCUTANEOUS at 18:47

## 2024-12-13 RX ADMIN — METOPROLOL SUCCINATE 50 MG: 50 TABLET, EXTENDED RELEASE ORAL at 08:31

## 2024-12-13 RX ADMIN — METOPROLOL SUCCINATE 50 MG: 50 TABLET, EXTENDED RELEASE ORAL at 21:19

## 2024-12-13 RX ADMIN — ATORVASTATIN CALCIUM 80 MG: 80 TABLET, FILM COATED ORAL at 21:19

## 2024-12-13 RX ADMIN — HYDRALAZINE HYDROCHLORIDE 100 MG: 50 TABLET ORAL at 08:31

## 2024-12-13 RX ADMIN — Medication 2 PUFF: at 08:41

## 2024-12-13 RX ADMIN — HEPARIN SODIUM 5000 UNITS: 5000 INJECTION INTRAVENOUS; SUBCUTANEOUS at 21:19

## 2024-12-13 RX ADMIN — DULOXETINE HYDROCHLORIDE 60 MG: 60 CAPSULE, DELAYED RELEASE ORAL at 08:31

## 2024-12-13 RX ADMIN — ISOSORBIDE MONONITRATE 60 MG: 60 TABLET ORAL at 21:19

## 2024-12-13 RX ADMIN — INSULIN GLARGINE 7 UNITS: 100 INJECTION, SOLUTION SUBCUTANEOUS at 21:18

## 2024-12-13 RX ADMIN — FLUTICASONE PROPIONATE 1 SPRAY: 50 SPRAY, METERED NASAL at 08:32

## 2024-12-13 RX ADMIN — SODIUM BICARBONATE: 84 INJECTION, SOLUTION INTRAVENOUS at 06:10

## 2024-12-13 RX ADMIN — LEVETIRACETAM 500 MG: 500 TABLET, FILM COATED ORAL at 08:30

## 2024-12-13 RX ADMIN — WATER 1000 MG: 1 INJECTION INTRAMUSCULAR; INTRAVENOUS; SUBCUTANEOUS at 00:31

## 2024-12-13 RX ADMIN — ISOSORBIDE MONONITRATE 60 MG: 60 TABLET ORAL at 08:34

## 2024-12-13 RX ADMIN — INSULIN LISPRO 2 UNITS: 100 INJECTION, SOLUTION INTRAVENOUS; SUBCUTANEOUS at 13:07

## 2024-12-13 RX ADMIN — QUETIAPINE FUMARATE 100 MG: 100 TABLET ORAL at 21:19

## 2024-12-13 RX ADMIN — AMLODIPINE BESYLATE 5 MG: 5 TABLET ORAL at 08:31

## 2024-12-13 RX ADMIN — ASPIRIN 81 MG: 81 TABLET, CHEWABLE ORAL at 08:31

## 2024-12-13 RX ADMIN — HEPARIN SODIUM 5000 UNITS: 5000 INJECTION INTRAVENOUS; SUBCUTANEOUS at 08:31

## 2024-12-13 RX ADMIN — PANTOPRAZOLE SODIUM 40 MG: 40 TABLET, DELAYED RELEASE ORAL at 06:10

## 2024-12-13 RX ADMIN — AMITRIPTYLINE HYDROCHLORIDE 40 MG: 10 TABLET, FILM COATED ORAL at 21:18

## 2024-12-13 RX ADMIN — HYDRALAZINE HYDROCHLORIDE 100 MG: 50 TABLET ORAL at 21:18

## 2024-12-13 RX ADMIN — MONTELUKAST 10 MG: 10 TABLET, FILM COATED ORAL at 08:31

## 2024-12-13 RX ADMIN — RANOLAZINE 1000 MG: 500 TABLET, FILM COATED, EXTENDED RELEASE ORAL at 21:19

## 2024-12-13 RX ADMIN — HYDRALAZINE HYDROCHLORIDE 100 MG: 50 TABLET ORAL at 13:07

## 2024-12-13 RX ADMIN — TICAGRELOR 90 MG: 90 TABLET ORAL at 08:31

## 2024-12-13 ASSESSMENT — PAIN SCALES - GENERAL: PAINLEVEL_OUTOF10: 0

## 2024-12-13 NOTE — PROGRESS NOTES
Department of Internal Medicine  Nephrology Progress Note        Reason for consultation: KOLBY      History of Present Illness: Maren Meza is a 68 yo female with a PMHx of CKD 3b/4, h/o KOLBY, HTN, afib s/p watchman, CAD, CHF, COPD, DM2, HLD. Patient presents to  ED on 12/7/2024 with complaints of chest pain and SOB. Troponins 54>46. CTA Chest abdomen is negative for acute findings. Imaging does reveal moderate to severe R renal artery disease. Patient is supposed to have an appt with vascular surgery tomorrow. Cardiology saw pt this admission and is going discuss with Dr. Allred.           Events noted . Labs reviewed   REVIEW OF SYSTEMS:  Resting , no acute complaints     No family present     Physical Exam:    VITALS:  BP (!) 140/66   Pulse 70   Temp 98.4 °F (36.9 °C) (Oral)   Resp 16   Ht 1.524 m (5')   Wt 50.9 kg (112 lb 3.4 oz)   SpO2 97%   BMI 21.92 kg/m²   24HR INTAKE/OUTPUT:    Intake/Output Summary (Last 24 hours) at 12/13/2024 1357  Last data filed at 12/13/2024 1316  Gross per 24 hour   Intake 190 ml   Output 1650 ml   Net -1460 ml       Constitutional: Looks comfortable   Respiratory:  CTA  Gastrointestinal:  No  tenderness.  Normal Bowel Sounds  Cardiovascular:  S1, S2 Irregular   Edema:   +  edema    DATA:    CBC:  Lab Results   Component Value Date/Time    WBC 7.0 12/12/2024 12:39 PM    RBC 2.86 12/12/2024 12:39 PM    HGB 8.9 12/12/2024 12:39 PM    HCT 27.0 12/12/2024 12:39 PM    MCV 94.4 12/12/2024 12:39 PM    MCH 31.2 12/12/2024 12:39 PM    MCHC 33.1 12/12/2024 12:39 PM    RDW 15.0 12/12/2024 12:39 PM     12/12/2024 12:39 PM    MPV 8.7 12/12/2024 12:39 PM     CMP:  Lab Results   Component Value Date/Time     12/13/2024 06:08 AM    K 3.8 12/13/2024 06:08 AM    K 3.8 12/07/2024 08:25 PM     12/13/2024 06:08 AM    CO2 20 12/13/2024 06:08 AM    BUN 42 12/13/2024 06:08 AM    CREATININE 2.5 12/13/2024 06:08 AM    GFRAA 46 10/09/2022 06:18 AM    GFRAA 60 05/23/2013 02:56 PM     AGRATIO 1.1 12/07/2024 08:25 PM    LABGLOM 20 12/13/2024 06:08 AM    LABGLOM 49 04/22/2024 04:43 AM    GLUCOSE 58 12/13/2024 06:08 AM    CALCIUM 9.6 12/13/2024 06:08 AM    BILITOT <0.2 12/08/2024 06:15 AM    ALKPHOS 144 12/08/2024 06:15 AM    AST 40 12/08/2024 06:15 AM    ALT 27 12/08/2024 06:15 AM      Hepatic Function Panel:   Lab Results   Component Value Date/Time    ALKPHOS 144 12/08/2024 06:15 AM    ALT 27 12/08/2024 06:15 AM    AST 40 12/08/2024 06:15 AM    BILITOT <0.2 12/08/2024 06:15 AM    BILIDIR <0.1 12/08/2024 06:15 AM    IBILI see below 12/08/2024 06:15 AM      Phosphorus:   Lab Results   Component Value Date/Time    PHOS 2.8 12/13/2024 06:08 AM       ASSESSMENT:  Principal Problem (Resolved):    Acute on chronic systolic right heart failure (HCC)  Active Problems:    Paroxysmal A-fib (HCC)    Chest pain    Elevated troponin I level    Acute on chronic diastolic heart failure (HCC)    Type 2 diabetes mellitus with diabetic chronic kidney disease (HCC)    Hypertensive heart and chronic kidney disease with heart failure and stage 1 through stage 4 chronic kidney disease, or unspecified chronic kidney disease (HCC)    CKD stage 3a, GFR 45-59 ml/min (HCC)    Type 2 diabetes mellitus with mild nonproliferative diabetic retinopathy without macular edema, bilateral (HCC)    Hypertensive emergency      PLAN:  1- 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 Cr baseline ~ 1.5 mg/dL prior to this.   Supposed to follow with Dr. Chung in office (last seen 11/2023). Etiology of KOLBY likely 2/2 contrast induced nephropathy in the setting of losartan use.              - improved Cr  ,Continue with IVF.               - s/p gill placement .              - Hold losartan              - Avoid nephrotoxic agents              - No indication for RRT     2- Non anion gap metabolic acidosis: 2/2 KOLBY/CKD              - resolved .      3- Chest pain              - Management per cardiology     4- R renal artery

## 2024-12-13 NOTE — PROGRESS NOTES
V2.0    [facility] – [place], [place] Progress Note      Name:  Maren Meza /Age/Sex: 1955  (69 y.o. female)   MRN & CSN:  [id number] & [id number] Encounter Date/Time: 2024 8:54 AM EST   Location:  [id number] PCP: Marin Cardenas MD     Attending:Kayode Bush DO       Hospital Day: 7  Brief Hospital Course  Maren Meza is a 69 y.o. female with pertinent PMHx of CHF, CKD 3, T2DM, paroxysmal A-fib, HTN who presented with chest heaviness and shortness of breath was admitted with significantly elevated blood pressure.  Hospital course was complicated by an KOLBY on CKD 4.  Assessment & Plan     Essential hypertension  Renal artery stenosis  -Renal duplex nondiagnostic secondary to patient movement  -Hydralazine 100 mg 3 times daily  -Metoprolol 50 mg twice daily  -Amlodipine 5 mg daily  -Outpatient follow-up with Dr. Allred    Atypical chest pain  -Chronic, recurrent problem  -Imdur and Ranexa  -Evaluated by Cardiology, no ischemic evaluation recommended    Urinary tract infection  -Follow-up urine culture data, currently no growth to date  -IV Rocephin 1 g daily    Chronic diastolic heart failure without acute exacerbation  -Echo on  showed EF 55 to 60% with grade 2 diastolic dysfunction    KOLBY on chronic kidney disease stage IV  -Suspected contrast-induced nephropathy, creatinine slowly improving  -Saez catheter in place  -Gentle IV fluid resuscitation  -Nephrology following, discussed with Rain BRIDGES, recommended voiding trial with discontinuation of Saez    Paroxysmal atrial fibrillation  -S/p Watchman    Type 2 diabetes  -Lantus 7 units daily  -Lispro 2 units 3 times daily with meals  -Low-dose sliding scale correctional insulin 4 times daily with meals and nightly  -Check point-of-care glucose 4 times daily with meals and nightly  -Diabetes education following    COPD without acute exacerbation  -Symbicort  -Continue chronic prednisone 10 mg daily    Seizure disorder  -Continue Keppra    Diet  Exam:  Protocol FINDINGS: CT Chest: No evidence of aortic aneurysm.  Calcified aorta.  Pulmonary arteries not enlarged.  Atrial appendage device.  Scattered mediastinal nodes but no significant lymphadenopathy. No active pulmonary infiltrates.  Mild fibrosis in the upper lobes.  No pleural effusion or pneumothorax.  No acute musculoskeletal abnormality. CTA CHEST: Thoracic Aorta: No evidence of thoracic aortic aneurysm or dissection.  There is diffuse calcification indicating plaque.  The origins of the great vessels are patent with some calcification.  No acute abnormality of the aorta. Mediastinum: No evidence of mediastinal lymphadenopathy.  The heart and pericardium demonstrate no acute abnormality.  No acute pulmonary embolism. Incidental atrial appendage device well positioned with exclusion of the atrial appendage.  The superior vena cava appears occluded.  Extensive venous collaterals can be seen including the azygous and hemiazygous systems as well as paraspinal veins. Lungs/Pleura: The lungs are without acute process.  Areas of mild subpleural fibrosis in the upper lobes.  No focal consolidation or pulmonary edema.  No evidence of pleural effusion or pneumothorax. Soft Tissues/Bones: No acute bone or soft tissue abnormality. CTA ABDOMEN: Abdominal Aorta/Branches: Abdominal aorta is nonaneurysmal.  No dissection. Celiac and ALISE demonstrates mild origin stenosis.  Left renal artery patent. The right renal artery demonstrates moderate to severe disease.  ALISE is patent as well. Organs: Liver appears unremarkable.  Status post cholecystectomy.  Pancreas and spleen, adrenals, kidneys and IVC appear stable. GI/Bowel: No bowel obstruction or perforation.  Mild diverticulosis but no acute diverticulitis. Peritoneum/Retroperitoneum: No retroperitoneal lymphadenopathy or acute mesenteric findings.  Retroperitoneal venous collaterals.  These likely connect the chest venous system to the abdominal systemic veins.  cholecystectomy.  Pancreas and spleen, adrenals, kidneys and IVC appear stable. GI/Bowel: No bowel obstruction or perforation.  Mild diverticulosis but no acute diverticulitis. Peritoneum/Retroperitoneum: No retroperitoneal lymphadenopathy or acute mesenteric findings.  Retroperitoneal venous collaterals.  These likely connect the chest venous system to the abdominal systemic veins. Bones/Soft Tissues: Lumbar spine and sacrum appear stable. CTA PELVIS: Aorta/Iliacs: Bilateral iliac and femoral circulation patent with no aneurysm or dissection or significant occlusive disease.  Arterial calcification. Other: Urinary bladder appears unremarkable.  Status post hysterectomy. Bones/Soft Tissues: Bilateral hips, acetabula and pubic rami appear intact.     1. No evidence of thoracic or abdominal aortic aneurysm or dissection. 2. No evidence of acute pulmonary embolism. 3. No acute cardiopulmonary process. 4. 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 5. Moderate to severe right renal artery disease. 6. Status post cholecystectomy and hysterectomy. 7. Mild diverticulosis but no acute diverticulitis. 8. Mild subpleural fibrosis in the upper lobes. 9. Incidental atrial appendage device well positioned with exclusion of the atrial appendage. 10. Retroperitoneal venous collaterals. These likely connect the chest venous system to the abdominal systemic veins.     XR CHEST PORTABLE    Result Date: 12/7/2024  EXAMINATION: ONE XRAY VIEW OF THE CHEST 12/7/2024 9:20 pm COMPARISON: 12/01/2024 HISTORY: ORDERING SYSTEM PROVIDED HISTORY: chest pain TECHNOLOGIST PROVIDED HISTORY: Reason for exam:->chest pain Reason for Exam: chest pain FINDINGS: Cardiomegaly.  Pulmonary vascular congestion.  Pulmonary edema.  No focal pulmonary consolidation.  No pneumothorax.     Findings suggest congestive heart failure       CBC:   Recent Labs     12/11/24  0615 12/12/24  0611 12/12/24  1239   WBC 7.0 8.1 7.0   HGB 9.0* 8.7* 8.9*  Date/Time    BLOODCULT2 No growth after 5 days of incubation. 07/28/2019 08:38 AM     Organism:   Lab Results   Component Value Date/Time    ORG Aerococcus species 06/04/2024 01:12 AM         Electronically signed by Kayode Bush DO on 12/13/2024 at 8:54 AM

## 2024-12-13 NOTE — PROGRESS NOTES
Indwelling catheter removed at this time per nephrology order. No complications.    Electronically signed by Hilda aLzo RN on 12/13/2024 at 1:18 PM

## 2024-12-13 NOTE — CARE COORDINATION
DISCHARGE PLANNING:    DC plan to return home alone with family support.  Family will transport.  Active with [facility] for SN/PT/OT.  Active with [facility] for [facility]HA 5d/wk for 5hr/d, MOWs. Increased Creatinine, watching nephrology recs. Cardiology signed off at this time.    #[phone]  Electronically signed by Trixie Baires RN on 12/13/2024 at 12:19 PM

## 2024-12-13 NOTE — PROGRESS NOTES
Nephrology Progress Note   [facility].[place]      Reason for consultation: KOLBY on CKD 3b/4 -- recent baseline ~ 1.7-2.0 mg/dL since 6/2024. Cr baseline ~ 1.5 mg/dL prior to this. Supposed to follow with Dr. Chung in office (last seen 11/2023).     HPI: Maren Meza is a 68 yo female with a PMHx of CKD 3b/4, h/o KOLBY, HTN, afib s/p watchman, CAD, CHF, COPD, DM2, HLD. Patient presents to  ED on 12/7/2024 with complaints of chest pain and SOB. Troponins 54>46. CTA Chest abdomen is negative for acute findings. Imaging does reveal moderate to severe R renal artery disease. Patient is supposed to have an appt with vascular surgery tomorrow. Cardiology saw pt this admission and is going discuss with Dr. Allred.     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 Baseline Cr has more recently been ~ 1.7-2.0 mg/dL. Cr upon admission was 2.2 mg/dL. Today, Cr is 2.9 mg/dL. Received IV contrast on 12/7/2024. Taking losartan (received doses 12/8 and 12/9). Received 40 mg IVP lasix on 12/8. Denies N/V/D. Endorses some urinary hesitancy. Denies NSAID use.     Subjective:    The patient has been seen and examined. Labs and chart reviewed. Resting in bed. On RA. Family is present. Cr is trending down.     There were not complications overnight.    Patient review of systems: Denies SOB       Allergies:  Allergies   Allergen Reactions    Bee Venom Shortness Of Breath        Scheduled Meds:   insulin lispro  2 Units SubCUTAneous TID     cefTRIAXone (ROCEPHIN) IV  1,000 mg IntraVENous Q24H    OLANZapine  5 mg IntraMUSCular Once    sodium chloride  250 mL IntraVENous Once    insulin glargine  0.15 Units/kg SubCUTAneous Nightly    insulin lispro  0-4 Units SubCUTAneous 4x Daily AC & HS    heparin (porcine)  5,000 Units SubCUTAneous BID    hydrALAZINE  100 mg Oral TID    amLODIPine  5 mg Oral Daily    sodium chloride flush  5-40 mL IntraVENous 2 times per day    aspirin  81 mg Oral Daily    atorvastatin  80 mg Oral Nightly     amitriptyline  40 mg Oral Nightly    oyster shell calcium w/D  1 tablet Oral Daily with breakfast    DULoxetine  60 mg Oral Daily    ferrous sulfate  325 mg Oral Daily with breakfast    fluticasone  1 spray Each Nostril Daily    budesonide-formoterol  2 puff Inhalation BID RT    isosorbide mononitrate  60 mg Oral BID    levETIRAcetam  500 mg Oral BID    linaclotide  290 mcg Oral Once per day on     metoprolol succinate  50 mg Oral BID    montelukast  10 mg Oral Daily    pantoprazole  40 mg Oral QAM AC    predniSONE  10 mg Oral Daily    QUEtiapine  100 mg Oral Nightly    ranolazine  1,000 mg Oral BID    ticagrelor  90 mg Oral BID        sodium bicarbonate 75 mEq in sodium chloride 0.45 % 1,075 mL infusion 50 mL/hr at 24 0610    sodium chloride      dextrose      dextrose         PRN Meds:sodium chloride flush, sodium chloride, ondansetron **OR** ondansetron, polyethylene glycol, acetaminophen **OR** [DISCONTINUED] acetaminophen, perflutren lipid microspheres, [Held by provider] traMADol, glucose, dextrose bolus **OR** dextrose bolus, glucagon (rDNA), dextrose, dextrose, hydrALAZINE    Physical Exam:    TEMPERATURE:  Current - Temp: 98.4 °F (36.9 °C); Max - Temp  Av.3 °F (36.8 °C)  Min: 98 °F (36.7 °C)  Max: 98.6 °F (37 °C)  RESPIRATIONS RANGE: Resp  Av.2  Min: 16  Max: 20  PULSE RANGE: Pulse  Av.8  Min: 70  Max: 77  BLOOD PRESSURE RANGE:  Systolic (24hrs), Av , Min:133 , Max:179   ; Diastolic (24hrs), Av, Min:58, Max:82    24HR INTAKE/OUTPUT:    Intake/Output Summary (Last 24 hours) at 2024 1236  Last data filed at 2024 0846  Gross per 24 hour   Intake 190 ml   Output 1100 ml   Net -910 ml       Patient Vitals for the past 96 hrs (Last 3 readings):   Weight   24 0447 50.9 kg (112 lb 3.4 oz)   24 0538 50 kg (110 lb 3.7 oz)   24 0304 49.6 kg (109 lb 5.6 oz)       General: awake, alert, NAD  HEENT: Normocephalic, atraumatic  Chest: diminished to

## 2024-12-13 NOTE — DISCHARGE INSTR - COC
Continuity of Care Form    Patient Name: Maren Meza   :  1955  MRN:  [id number]    Admit date:  2024  Discharge date:  ***    Code Status Order: Full Code   Advance Directives:   Advance Care Flowsheet Documentation             Admitting Physician:  Kali Faustin MD  PCP: Marin Cardenas MD    Discharging Nurse: ***  Discharging Hospital Unit/Room#: H4J-3612/5118-01  Discharging Unit Phone Number: ***    Emergency Contact:   Extended Emergency Contact Information  Primary Emergency Contact: Senthil Fisheronda   [facility]  Home Phone: [phone]  Mobile Phone: [phone]  Relation: Child  Secondary Emergency Contact: Ester Jenkins  Address: DAUGHTER   [facility]  Home Phone: [phone]  Mobile Phone: [phone]  Relation: Child    Past Surgical History:  Past Surgical History:   Procedure Laterality Date    ABLATION OF DYSRHYTHMIC FOCUS    and 2020    times 2    ARTERY BIOPSY Right 2014    RIGHT TEMPORAL ARTERY BIOPSY    CAROTID ARTERY SURGERY Left     clean up per pt    CATARACT REMOVAL Bilateral     CHOLECYSTECTOMY      COLONOSCOPY N/A 2021    COLONOSCOPY WITH BIOPSY performed by Gera Matthews MD at [facility] ENDOSCOPY    COLONOSCOPY N/A 10/15/2021    COLONOSCOPY performed by Gera Matthews MD at [facility] ENDOSCOPY    COLONOSCOPY N/A 2024    COLONOSCOPY POLYPECTOMY SNARE/BIOPSY performed by Marcio Castillo MD at [facility] ENDOSCOPY    CORONARY ANGIOPLASTY WITH STENT PLACEMENT      HYSTERECTOMY (CERVIX STATUS UNKNOWN)      JOINT REPLACEMENT Right     KNEE ARTHROSCOPY Right     PTCA  10/2019    LAD and RCA inrtervention    TUNNELED VENOUS PORT PLACEMENT      left thigh.  SMART PORT-----Removed--total of 4 port placement and removal    UPPER GASTROINTESTINAL ENDOSCOPY N/A 2020    EGD DIAGNOSTIC ONLY performed by Jose Pickens MD at [facility] ENDOSCOPY    UPPER GASTROINTESTINAL ENDOSCOPY N/A 2024    ESOPHAGOGASTRODUODENOSCOPY BIOPSY  Discharge: Stable    Rehab Potential (if transferring to Rehab): Fair    Recommended Labs or Other Treatments After Discharge: Follow up with PCP, cardiology and nephrology in 1 week    Physician Certification: I certify the above information and transfer of Maren Meza  is necessary for the continuing treatment of the diagnosis listed and that she requires Home Care for less 30 days.     Update Admission H&P: No change in H&P    PHYSICIAN SIGNATURE:  Electronically signed by Zhou Woodard MD on 12/16/24 at 2:39 PM EST

## 2024-12-13 NOTE — PLAN OF CARE
Problem: Chronic Conditions and Co-morbidities  Goal: Patient's chronic conditions and co-morbidity symptoms are monitored and maintained or improved  Outcome: Progressing     Problem: Discharge Planning  Goal: Discharge to home or other facility with appropriate resources  Outcome: Progressing     Problem: Safety - Adult  Goal: Free from fall injury  Outcome: Progressing     Problem: Cardiovascular - Adult  Goal: Maintains optimal cardiac output and hemodynamic stability  Outcome: Progressing     Problem: Cardiovascular - Adult  Goal: Absence of cardiac dysrhythmias or at baseline  Outcome: Progressing     Problem: Neurosensory - Adult  Goal: Achieves stable or improved neurological status  Outcome: Progressing     Problem: Respiratory - Adult  Goal: Achieves optimal ventilation and oxygenation  Outcome: Progressing     Problem: Skin/Tissue Integrity - Adult  Goal: Skin integrity remains intact  Outcome: Progressing     Problem: Genitourinary - Adult  Goal: Absence of urinary retention  Outcome: Progressing     Problem: Genitourinary - Adult  Goal: Urinary catheter remains patent  Outcome: Progressing     Problem: Infection - Adult  Goal: Absence of infection at discharge  Outcome: Progressing     Problem: Metabolic/Fluid and Electrolytes - Adult  Goal: Electrolytes maintained within normal limits  Outcome: Progressing     Problem: Metabolic/Fluid and Electrolytes - Adult  Goal: Hemodynamic stability and optimal renal function maintained  Outcome: Progressing     Problem: Metabolic/Fluid and Electrolytes - Adult  Goal: Glucose maintained within prescribed range  Outcome: Progressing     Problem: Hematologic - Adult  Goal: Maintains hematologic stability  Outcome: Progressing     Problem: Pain  Goal: Verbalizes/displays adequate comfort level or baseline comfort level  Outcome: Progressing     Problem: ABCDS Injury Assessment  Goal: Absence of physical injury  Outcome: Progressing     Problem: Anxiety  Goal: Will  report anxiety at manageable levels  Description: INTERVENTIONS:  1. Administer medication as ordered  2. Teach and rehearse alternative coping skills  3. Provide emotional support with 1:1 interaction with staff  Outcome: Progressing     Problem: Coping  Goal: Pt/Family able to verbalize concerns and demonstrate effective coping strategies  Description: INTERVENTIONS:  1. Assist patient/family to identify coping skills, available support systems and cultural and spiritual values  2. Provide emotional support, including active listening and acknowledgement of concerns of patient and caregivers  3. Reduce environmental stimuli, as able  4. Instruct patient/family in relaxation techniques, as appropriate  5. Assess for spiritual pain/suffering and initiate Spiritual Care, Psychosocial Clinical Specialist consults as needed  Outcome: Progressing     Problem: Change in Body Image  Goal: Pt/Family communicate acceptance of loss or change in body image and feel psychological comfort and peace  Description: INTERVENTIONS:  1. Assess patient/family anxiety and grief process related to change in body image, loss of functional status, loss of sense of self, and forgiveness  2. Provide emotional and spiritual support  3. Provide information about the patient's health status with consideration of family and cultural values  4. Communicate willingness to discuss loss and facilitate grief process with patient/family as appropriate  5. Emphasize sustaining relationships within family system and community, or luis/spiritual traditions  6. Initiate Spiritual Care, Psychosocial Clinical Specialist consult as needed  Outcome: Progressing     Problem: Decision Making  Goal: Pt/Family able to effectively weigh alternatives and participate in decision making related to treatment and care  Description: INTERVENTIONS:  1. Determine when there are differences between patient's view, family's view, and healthcare provider's view of

## 2024-12-13 NOTE — PROGRESS NOTES
Updated daughter Neida Fisher of pt status per pt request. Daughter would like Doctors to call her for updates. Left message on physician sticky notes.

## 2024-12-13 NOTE — PLAN OF CARE
Problem: Chronic Conditions and Co-morbidities  Goal: Patient's chronic conditions and co-morbidity symptoms are monitored and maintained or improved  12/12/2024 2107 by Nirav Romero RN  Outcome: Progressing  12/12/2024 1103 by Kali Sanchez RN  Outcome: Progressing     Problem: Discharge Planning  Goal: Discharge to home or other facility with appropriate resources  12/12/2024 2107 by Nirav Romero RN  Outcome: Progressing  Flowsheets (Taken 12/12/2024 2103)  Discharge to home or other facility with appropriate resources:   Identify barriers to discharge with patient and caregiver   Arrange for needed discharge resources and transportation as appropriate   Identify discharge learning needs (meds, wound care, etc)  12/12/2024 1103 by Kali Sanchez RN  Outcome: Progressing     Problem: Safety - Adult  Goal: Free from fall injury  12/12/2024 2107 by Nirav Romero RN  Outcome: Progressing  12/12/2024 1103 by Kali Sanchez RN  Outcome: Progressing     Problem: Cardiovascular - Adult  Goal: Maintains optimal cardiac output and hemodynamic stability  12/12/2024 2107 by Nirav Romero RN  Outcome: Progressing  Flowsheets (Taken 12/12/2024 2103)  Maintains optimal cardiac output and hemodynamic stability:   Monitor blood pressure and heart rate   Monitor urine output and notify Licensed Independent Practitioner for values outside of normal range   Assess for signs of decreased cardiac output  12/12/2024 1103 by Kali Sanchez RN  Outcome: Progressing  Goal: Absence of cardiac dysrhythmias or at baseline  12/12/2024 2107 by Nirav Romero RN  Outcome: Progressing  Flowsheets (Taken 12/12/2024 2103)  Absence of cardiac dysrhythmias or at baseline:   Monitor cardiac rate and rhythm   Assess for signs of decreased cardiac output   Administer antiarrhythmia medication and electrolyte replacement as ordered  12/12/2024 1103 by Kali Sanchez RN  Outcome: Progressing     Problem: Neurosensory - Adult  Goal:  Achieves stable or improved neurological status  12/12/2024 2107 by Nirav Romero RN  Outcome: Progressing  12/12/2024 1103 by Kali Sanchez RN  Outcome: Progressing     Problem: Respiratory - Adult  Goal: Achieves optimal ventilation and oxygenation  12/12/2024 2107 by Nirav Romero RN  Outcome: Progressing  12/12/2024 1103 by Kali Sanchez RN  Outcome: Progressing     Problem: Skin/Tissue Integrity - Adult  Goal: Skin integrity remains intact  12/12/2024 2107 by Nirav Romero RN  Outcome: Progressing  Flowsheets (Taken 12/12/2024 2103)  Skin Integrity Remains Intact:   Monitor for areas of redness and/or skin breakdown   Assess vascular access sites hourly  12/12/2024 1103 by Kali Sanchez RN  Outcome: Progressing     Problem: Genitourinary - Adult  Goal: Absence of urinary retention  12/12/2024 2107 by Nirav Romero RN  Outcome: Progressing  12/12/2024 1103 by Kali Sanchez RN  Outcome: Progressing  Goal: Urinary catheter remains patent  12/12/2024 2107 by Nirav Romero RN  Outcome: Progressing  12/12/2024 1103 by Kali Sanchez RN  Outcome: Progressing     Problem: Infection - Adult  Goal: Absence of infection at discharge  12/12/2024 2107 by Nirav Romero RN  Outcome: Progressing  12/12/2024 1103 by Kali Sanchez RN  Outcome: Progressing     Problem: Metabolic/Fluid and Electrolytes - Adult  Goal: Electrolytes maintained within normal limits  12/12/2024 2107 by Nirav Romero RN  Outcome: Progressing  12/12/2024 1103 by Kali Sanchez RN  Outcome: Progressing  Goal: Hemodynamic stability and optimal renal function maintained  12/12/2024 2107 by Nirav Romero RN  Outcome: Progressing  12/12/2024 1103 by Kali Sanchez RN  Outcome: Progressing  Goal: Glucose maintained within prescribed range  12/12/2024 2107 by Nirav oRmero RN  Outcome: Progressing  12/12/2024 1103 by Kali Sanchez RN  Outcome: Progressing     Problem: Hematologic - Adult  Goal: Maintains hematologic  environmental stimuli, including noise as appropriate  5. Monitor and intervene to maintain adequate nutrition, hydration, elimination, sleep and activity  6. If unable to ensure safety without constant attention obtain sitter and review sitter guidelines with assigned personnel  7. Initiate Psychosocial CNS and Spiritual Care consult, as indicated  12/12/2024 2107 by Nirav Romero, RN  Outcome: Progressing  12/12/2024 1103 by Kali Sanchez, RN  Outcome: Progressing

## 2024-12-14 LAB
ALBUMIN SERPL-MCNC: 3.5 G/DL (ref 3.4–5)
ANION GAP SERPL CALCULATED.3IONS-SCNC: 16 MMOL/L (ref 3–16)
BACTERIA URNS QL MICRO: NORMAL /HPF
BASOPHILS # BLD: 0 K/UL (ref 0–0.2)
BASOPHILS NFR BLD: 0.2 %
BILIRUB UR QL STRIP.AUTO: NEGATIVE
BUN SERPL-MCNC: 32 MG/DL (ref 7–20)
CALCIUM SERPL-MCNC: 9.1 MG/DL (ref 8.3–10.6)
CHLORIDE SERPL-SCNC: 98 MMOL/L (ref 99–110)
CLARITY UR: CLEAR
CO2 SERPL-SCNC: 24 MMOL/L (ref 21–32)
COLOR UR: YELLOW
CREAT SERPL-MCNC: 2 MG/DL (ref 0.6–1.2)
DEPRECATED RDW RBC AUTO: 14.9 % (ref 12.4–15.4)
EOSINOPHIL # BLD: 0 K/UL (ref 0–0.6)
EOSINOPHIL NFR BLD: 0.3 %
EPI CELLS #/AREA URNS AUTO: 1 /HPF (ref 0–5)
GFR SERPLBLD CREATININE-BSD FMLA CKD-EPI: 27 ML/MIN/{1.73_M2}
GLUCOSE BLD-MCNC: 119 MG/DL (ref 70–99)
GLUCOSE BLD-MCNC: 131 MG/DL (ref 70–99)
GLUCOSE BLD-MCNC: 132 MG/DL (ref 70–99)
GLUCOSE BLD-MCNC: 135 MG/DL (ref 70–99)
GLUCOSE SERPL-MCNC: 121 MG/DL (ref 70–99)
GLUCOSE UR STRIP.AUTO-MCNC: NEGATIVE MG/DL
HCT VFR BLD AUTO: 27.4 % (ref 36–48)
HGB BLD-MCNC: 9 G/DL (ref 12–16)
HGB UR QL STRIP.AUTO: NEGATIVE
HYALINE CASTS #/AREA URNS AUTO: 0 /LPF (ref 0–8)
KETONES UR STRIP.AUTO-MCNC: ABNORMAL MG/DL
LEUKOCYTE ESTERASE UR QL STRIP.AUTO: NEGATIVE
LYMPHOCYTES # BLD: 0.8 K/UL (ref 1–5.1)
LYMPHOCYTES NFR BLD: 11.4 %
MCH RBC QN AUTO: 31.1 PG (ref 26–34)
MCHC RBC AUTO-ENTMCNC: 32.9 G/DL (ref 31–36)
MCV RBC AUTO: 94.7 FL (ref 80–100)
MONOCYTES # BLD: 0.5 K/UL (ref 0–1.3)
MONOCYTES NFR BLD: 6.9 %
NEUTROPHILS # BLD: 5.5 K/UL (ref 1.7–7.7)
NEUTROPHILS NFR BLD: 81.2 %
NITRITE UR QL STRIP.AUTO: NEGATIVE
PERFORMED ON: ABNORMAL
PH UR STRIP.AUTO: 8 [PH] (ref 5–8)
PHOSPHATE SERPL-MCNC: 2.5 MG/DL (ref 2.5–4.9)
PLATELET # BLD AUTO: 171 K/UL (ref 135–450)
PMV BLD AUTO: 8.7 FL (ref 5–10.5)
POTASSIUM SERPL-SCNC: 3.4 MMOL/L (ref 3.5–5.1)
PROT UR STRIP.AUTO-MCNC: 100 MG/DL
RBC # BLD AUTO: 2.9 M/UL (ref 4–5.2)
RBC CLUMPS #/AREA URNS AUTO: 3 /HPF (ref 0–4)
SODIUM SERPL-SCNC: 138 MMOL/L (ref 136–145)
SP GR UR STRIP.AUTO: 1.01 (ref 1–1.03)
UA DIPSTICK W REFLEX MICRO PNL UR: YES
URN SPEC COLLECT METH UR: ABNORMAL
UROBILINOGEN UR STRIP-ACNC: 0.2 E.U./DL
WBC # BLD AUTO: 6.8 K/UL (ref 4–11)
WBC #/AREA URNS AUTO: 1 /HPF (ref 0–5)

## 2024-12-14 PROCEDURE — 94760 N-INVAS EAR/PLS OXIMETRY 1: CPT

## 2024-12-14 PROCEDURE — 6370000000 HC RX 637 (ALT 250 FOR IP): Performed by: HOSPITALIST

## 2024-12-14 PROCEDURE — 2580000003 HC RX 258

## 2024-12-14 PROCEDURE — 2580000003 HC RX 258: Performed by: REGISTERED NURSE

## 2024-12-14 PROCEDURE — 85025 COMPLETE CBC W/AUTO DIFF WBC: CPT

## 2024-12-14 PROCEDURE — 80069 RENAL FUNCTION PANEL: CPT

## 2024-12-14 PROCEDURE — 2500000003 HC RX 250 WO HCPCS

## 2024-12-14 PROCEDURE — 2580000003 HC RX 258: Performed by: HOSPITALIST

## 2024-12-14 PROCEDURE — 6370000000 HC RX 637 (ALT 250 FOR IP): Performed by: STUDENT IN AN ORGANIZED HEALTH CARE EDUCATION/TRAINING PROGRAM

## 2024-12-14 PROCEDURE — 2060000000 HC ICU INTERMEDIATE R&B

## 2024-12-14 PROCEDURE — 2580000003 HC RX 258: Performed by: INTERNAL MEDICINE

## 2024-12-14 PROCEDURE — 81001 URINALYSIS AUTO W/SCOPE: CPT

## 2024-12-14 PROCEDURE — 36415 COLL VENOUS BLD VENIPUNCTURE: CPT

## 2024-12-14 PROCEDURE — 6360000002 HC RX W HCPCS: Performed by: HOSPITALIST

## 2024-12-14 PROCEDURE — 6370000000 HC RX 637 (ALT 250 FOR IP): Performed by: INTERNAL MEDICINE

## 2024-12-14 PROCEDURE — 6360000002 HC RX W HCPCS: Performed by: REGISTERED NURSE

## 2024-12-14 PROCEDURE — 94640 AIRWAY INHALATION TREATMENT: CPT

## 2024-12-14 RX ORDER — SENNA AND DOCUSATE SODIUM 50; 8.6 MG/1; MG/1
2 TABLET, FILM COATED ORAL 2 TIMES DAILY PRN
Status: DISCONTINUED | OUTPATIENT
Start: 2024-12-14 | End: 2024-12-16 | Stop reason: HOSPADM

## 2024-12-14 RX ORDER — SODIUM CHLORIDE 9 MG/ML
INJECTION, SOLUTION INTRAVENOUS CONTINUOUS
Status: DISCONTINUED | OUTPATIENT
Start: 2024-12-14 | End: 2024-12-15

## 2024-12-14 RX ORDER — OXYCODONE HYDROCHLORIDE 5 MG/1
5 TABLET ORAL EVERY 6 HOURS PRN
Status: DISCONTINUED | OUTPATIENT
Start: 2024-12-14 | End: 2024-12-16 | Stop reason: HOSPADM

## 2024-12-14 RX ORDER — POTASSIUM CHLORIDE 1500 MG/1
40 TABLET, EXTENDED RELEASE ORAL ONCE
Status: COMPLETED | OUTPATIENT
Start: 2024-12-14 | End: 2024-12-14

## 2024-12-14 RX ADMIN — ONDANSETRON 4 MG: 2 INJECTION INTRAMUSCULAR; INTRAVENOUS at 20:55

## 2024-12-14 RX ADMIN — LEVETIRACETAM 500 MG: 500 TABLET, FILM COATED ORAL at 10:37

## 2024-12-14 RX ADMIN — INSULIN GLARGINE 7 UNITS: 100 INJECTION, SOLUTION SUBCUTANEOUS at 20:06

## 2024-12-14 RX ADMIN — AMITRIPTYLINE HYDROCHLORIDE 40 MG: 10 TABLET, FILM COATED ORAL at 20:05

## 2024-12-14 RX ADMIN — RANOLAZINE 1000 MG: 500 TABLET, FILM COATED, EXTENDED RELEASE ORAL at 10:38

## 2024-12-14 RX ADMIN — TICAGRELOR 90 MG: 90 TABLET ORAL at 20:06

## 2024-12-14 RX ADMIN — HYDRALAZINE HYDROCHLORIDE 100 MG: 50 TABLET ORAL at 10:37

## 2024-12-14 RX ADMIN — TICAGRELOR 90 MG: 90 TABLET ORAL at 11:02

## 2024-12-14 RX ADMIN — PANTOPRAZOLE SODIUM 40 MG: 40 TABLET, DELAYED RELEASE ORAL at 06:49

## 2024-12-14 RX ADMIN — METOPROLOL SUCCINATE 50 MG: 50 TABLET, EXTENDED RELEASE ORAL at 20:06

## 2024-12-14 RX ADMIN — HYDRALAZINE HYDROCHLORIDE 10 MG: 20 INJECTION INTRAMUSCULAR; INTRAVENOUS at 15:21

## 2024-12-14 RX ADMIN — INSULIN LISPRO 2 UNITS: 100 INJECTION, SOLUTION INTRAVENOUS; SUBCUTANEOUS at 13:15

## 2024-12-14 RX ADMIN — PREDNISONE 10 MG: 10 TABLET ORAL at 10:36

## 2024-12-14 RX ADMIN — SODIUM CHLORIDE: 9 INJECTION, SOLUTION INTRAVENOUS at 15:17

## 2024-12-14 RX ADMIN — ATORVASTATIN CALCIUM 80 MG: 80 TABLET, FILM COATED ORAL at 20:06

## 2024-12-14 RX ADMIN — METOPROLOL SUCCINATE 50 MG: 50 TABLET, EXTENDED RELEASE ORAL at 10:37

## 2024-12-14 RX ADMIN — DULOXETINE HYDROCHLORIDE 60 MG: 60 CAPSULE, DELAYED RELEASE ORAL at 10:37

## 2024-12-14 RX ADMIN — AMLODIPINE BESYLATE 5 MG: 5 TABLET ORAL at 10:38

## 2024-12-14 RX ADMIN — POTASSIUM CHLORIDE 40 MEQ: 1500 TABLET, EXTENDED RELEASE ORAL at 16:47

## 2024-12-14 RX ADMIN — Medication 2 PUFF: at 09:17

## 2024-12-14 RX ADMIN — HYDRALAZINE HYDROCHLORIDE 100 MG: 50 TABLET ORAL at 14:16

## 2024-12-14 RX ADMIN — HEPARIN SODIUM 5000 UNITS: 5000 INJECTION INTRAVENOUS; SUBCUTANEOUS at 20:07

## 2024-12-14 RX ADMIN — ISOSORBIDE MONONITRATE 60 MG: 60 TABLET ORAL at 10:38

## 2024-12-14 RX ADMIN — WATER 1000 MG: 1 INJECTION INTRAMUSCULAR; INTRAVENOUS; SUBCUTANEOUS at 00:41

## 2024-12-14 RX ADMIN — ONDANSETRON 4 MG: 4 TABLET, ORALLY DISINTEGRATING ORAL at 11:10

## 2024-12-14 RX ADMIN — SODIUM CHLORIDE, PRESERVATIVE FREE 10 ML: 5 INJECTION INTRAVENOUS at 10:36

## 2024-12-14 RX ADMIN — QUETIAPINE FUMARATE 100 MG: 100 TABLET ORAL at 20:05

## 2024-12-14 RX ADMIN — SENNOSIDES AND DOCUSATE SODIUM 2 TABLET: 50; 8.6 TABLET ORAL at 17:23

## 2024-12-14 RX ADMIN — RANOLAZINE 1000 MG: 500 TABLET, FILM COATED, EXTENDED RELEASE ORAL at 20:06

## 2024-12-14 RX ADMIN — OXYCODONE 5 MG: 5 TABLET ORAL at 17:23

## 2024-12-14 RX ADMIN — SODIUM BICARBONATE: 84 INJECTION, SOLUTION INTRAVENOUS at 06:51

## 2024-12-14 RX ADMIN — HYDRALAZINE HYDROCHLORIDE 100 MG: 50 TABLET ORAL at 20:05

## 2024-12-14 RX ADMIN — HEPARIN SODIUM 5000 UNITS: 5000 INJECTION INTRAVENOUS; SUBCUTANEOUS at 11:02

## 2024-12-14 RX ADMIN — INSULIN LISPRO 2 UNITS: 100 INJECTION, SOLUTION INTRAVENOUS; SUBCUTANEOUS at 18:25

## 2024-12-14 RX ADMIN — FLUTICASONE PROPIONATE 1 SPRAY: 50 SPRAY, METERED NASAL at 10:39

## 2024-12-14 RX ADMIN — MONTELUKAST 10 MG: 10 TABLET, FILM COATED ORAL at 10:38

## 2024-12-14 RX ADMIN — SODIUM CHLORIDE, PRESERVATIVE FREE 10 ML: 5 INJECTION INTRAVENOUS at 15:21

## 2024-12-14 RX ADMIN — Medication 2 PUFF: at 19:58

## 2024-12-14 RX ADMIN — ASPIRIN 81 MG: 81 TABLET, CHEWABLE ORAL at 11:02

## 2024-12-14 RX ADMIN — LEVETIRACETAM 500 MG: 500 TABLET, FILM COATED ORAL at 20:05

## 2024-12-14 RX ADMIN — CALCIUM CARBONATE-VITAMIN D TAB 500 MG-200 UNIT 1 TABLET: 500-200 TAB at 10:37

## 2024-12-14 RX ADMIN — FERROUS SULFATE TAB 325 MG (65 MG ELEMENTAL FE) 325 MG: 325 (65 FE) TAB at 10:37

## 2024-12-14 RX ADMIN — SODIUM CHLORIDE, PRESERVATIVE FREE 10 ML: 5 INJECTION INTRAVENOUS at 20:07

## 2024-12-14 RX ADMIN — ISOSORBIDE MONONITRATE 60 MG: 60 TABLET ORAL at 20:06

## 2024-12-14 ASSESSMENT — PAIN DESCRIPTION - LOCATION: LOCATION: FLANK

## 2024-12-14 ASSESSMENT — PAIN SCALES - WONG BAKER: WONGBAKER_NUMERICALRESPONSE: NO HURT

## 2024-12-14 ASSESSMENT — PAIN DESCRIPTION - DESCRIPTORS: DESCRIPTORS: THROBBING

## 2024-12-14 ASSESSMENT — PAIN SCALES - GENERAL
PAINLEVEL_OUTOF10: 0
PAINLEVEL_OUTOF10: 9

## 2024-12-14 ASSESSMENT — PAIN - FUNCTIONAL ASSESSMENT: PAIN_FUNCTIONAL_ASSESSMENT: ACTIVITIES ARE NOT PREVENTED

## 2024-12-14 ASSESSMENT — PAIN DESCRIPTION - ORIENTATION: ORIENTATION: RIGHT;LEFT

## 2024-12-14 NOTE — PLAN OF CARE
Problem: Chronic Conditions and Co-morbidities  Goal: Patient's chronic conditions and co-morbidity symptoms are monitored and maintained or improved  12/14/2024 0117 by Yancy Buck RN  Outcome: Progressing  Flowsheets (Taken 12/13/2024 2120)  Care Plan - Patient's Chronic Conditions and Co-Morbidity Symptoms are Monitored and Maintained or Improved:   Monitor and assess patient's chronic conditions and comorbid symptoms for stability, deterioration, or improvement   Collaborate with multidisciplinary team to address chronic and comorbid conditions and prevent exacerbation or deterioration   Update acute care plan with appropriate goals if chronic or comorbid symptoms are exacerbated and prevent overall improvement and discharge  12/13/2024 1706 by Hilda Lazo RN  Outcome: Progressing     Problem: Discharge Planning  Goal: Discharge to home or other facility with appropriate resources  12/14/2024 0117 by Yancy Buck RN  Outcome: Progressing  Flowsheets (Taken 12/13/2024 2120)  Discharge to home or other facility with appropriate resources:   Identify barriers to discharge with patient and caregiver   Identify discharge learning needs (meds, wound care, etc)   Arrange for needed discharge resources and transportation as appropriate   Refer to discharge planning if patient needs post-hospital services based on physician order or complex needs related to functional status, cognitive ability or social support system  12/13/2024 1706 by Hilda Lazo RN  Outcome: Progressing     Problem: Safety - Adult  Goal: Free from fall injury  12/14/2024 0117 by Yancy Buck RN  Outcome: Progressing  Flowsheets (Taken 12/14/2024 0116)  Free From Fall Injury:   Based on caregiver fall risk screen, instruct family/caregiver to ask for assistance with transferring infant if caregiver noted to have fall risk factors   Instruct family/caregiver on patient safety  12/13/2024 1706 by Hilda Lazo, RN  Outcome:  attention obtain sitter and review sitter guidelines with assigned personnel  7. Initiate Psychosocial CNS and Spiritual Care consult, as indicated  12/14/2024 0117 by Yancy Buck, RN  Outcome: Progressing  Flowsheets (Taken 12/13/2024 2120)  Effect of thought disturbance (confusion, delirium, dementia, or psychosis) are managed with adequate functional status:   [place] high risk fall precautions, as indicated   Assess for contributors to thought disturbance, including medications, impaired vision or hearing, underlying metabolic abnormalities, dehydration, psychiatric diagnoses, notify LIP   Provide frequent short contacts to provide reality reorientation, refocusing and direction   Decrease environmental stimuli, including noise as appropriate   Monitor and intervene to maintain adequate nutrition, hydration, elimination, sleep and activity   If unable to ensure safety without constant attention obtain sitter and review sitter guidelines with assigned personnel   Initiate Psychosocial Clinical Nurse Specialist and Spiritual Care consult, as indicated  12/13/2024 1706 by Hilda Lazo, RN  Outcome: Progressing

## 2024-12-14 NOTE — FLOWSHEET NOTE
12/14/24 1715   Treatment Team Notification   Reason for Communication   (/44. ADMINISTERED PRN HYDRALAZINE. RECHECK /65. C/O BILAT FLANK PAIN 9/10. ADMINISTERED PRN OXYCODONE.)   Name of Team Member Notified DR. SARA VIERA   Treatment Team Role Attending Provider   Method of Communication Secure Message   Response No new orders   Notification Time 9893

## 2024-12-14 NOTE — FLOWSHEET NOTE
12/14/24 1041   Treatment Team Notification   Reason for Communication   (NIGHTSHIFT RN REPORTED NOSEBLEED OVERNIGHT. STOPPED WITH APPLIED PRESSURE. HAS SCHEDULED SQ HEPARIN, 81 MG ASA, AND BRILINTA DUE THIS MORNING. QUESTIONING TO ADMINISTER OR HOLD.)   Name of Team Member Notified DR. SARA VIERA   Treatment Team Role Attending Provider   Method of Communication Secure Message   Response Waiting for response   Notification Time 1041     12/14/2024 @ 1055: Dr. Viera responded via [employer] advising OK to administer scheduled medications. See MAR. Electronically signed by Marifer White RN on 12/14/2024 at 11:01 AM

## 2024-12-14 NOTE — PROGRESS NOTES
Department of Internal Medicine  Nephrology Progress Note        Reason for consultation: KOLBY      History of Present Illness: Maren Meza is a 68 yo female with a PMHx of CKD 3b/4, h/o KOLBY, HTN, afib s/p watchman, CAD, CHF, COPD, DM2, HLD. Patient presents to  ED on 12/7/2024 with complaints of chest pain and SOB. Troponins 54>46. CTA Chest abdomen is negative for acute findings. Imaging does reveal moderate to severe R renal artery disease. Patient is supposed to have an appt with vascular surgery tomorrow. Cardiology saw pt this admission and is going discuss with Dr. Allred.           Events noted . Labs reviewed   REVIEW OF SYSTEMS:  Resting , no acute complaints     No family present     Physical Exam:    VITALS:  BP (!) 164/67   Pulse 74   Temp 98.2 °F (36.8 °C) (Oral)   Resp 20   Ht 1.524 m (5')   Wt 51.1 kg (112 lb 10.5 oz)   SpO2 97%   BMI 22.00 kg/m²   24HR INTAKE/OUTPUT:    Intake/Output Summary (Last 24 hours) at 12/14/2024 1439  Last data filed at 12/14/2024 1223  Gross per 24 hour   Intake 3560.42 ml   Output 800 ml   Net 2760.42 ml       Constitutional: Looks comfortable   Respiratory:  CTA  Gastrointestinal:  No  tenderness.  Normal Bowel Sounds  Cardiovascular:  S1, S2 Irregular   Edema:   +  edema    DATA:    CBC:  Lab Results   Component Value Date/Time    WBC 6.8 12/14/2024 01:01 PM    RBC 2.90 12/14/2024 01:01 PM    HGB 9.0 12/14/2024 01:01 PM    HCT 27.4 12/14/2024 01:01 PM    MCV 94.7 12/14/2024 01:01 PM    MCH 31.1 12/14/2024 01:01 PM    MCHC 32.9 12/14/2024 01:01 PM    RDW 14.9 12/14/2024 01:01 PM     12/14/2024 01:01 PM    MPV 8.7 12/14/2024 01:01 PM     CMP:  Lab Results   Component Value Date/Time     12/14/2024 01:01 PM    K 3.4 12/14/2024 01:01 PM    K 3.8 12/07/2024 08:25 PM    CL 98 12/14/2024 01:01 PM    CO2 24 12/14/2024 01:01 PM    BUN 32 12/14/2024 01:01 PM    CREATININE 2.0 12/14/2024 01:01 PM    GFRAA 46 10/09/2022 06:18 AM    GFRAA 60 05/23/2013 02:56  PM    AGRATIO 1.1 12/07/2024 08:25 PM    LABGLOM 27 12/14/2024 01:01 PM    LABGLOM 49 04/22/2024 04:43 AM    GLUCOSE 121 12/14/2024 01:01 PM    CALCIUM 9.1 12/14/2024 01:01 PM    BILITOT <0.2 12/08/2024 06:15 AM    ALKPHOS 144 12/08/2024 06:15 AM    AST 40 12/08/2024 06:15 AM    ALT 27 12/08/2024 06:15 AM      Hepatic Function Panel:   Lab Results   Component Value Date/Time    ALKPHOS 144 12/08/2024 06:15 AM    ALT 27 12/08/2024 06:15 AM    AST 40 12/08/2024 06:15 AM    BILITOT <0.2 12/08/2024 06:15 AM    BILIDIR <0.1 12/08/2024 06:15 AM    IBILI see below 12/08/2024 06:15 AM      Phosphorus:   Lab Results   Component Value Date/Time    PHOS 2.5 12/14/2024 01:01 PM       ASSESSMENT:  Principal Problem (Resolved):    Acute on chronic systolic right heart failure (HCC)  Active Problems:    Paroxysmal A-fib (HCC)    Chest pain    Elevated troponin I level    Acute on chronic diastolic heart failure (HCC)    Type 2 diabetes mellitus with diabetic chronic kidney disease (HCC)    Hypertensive heart and chronic kidney disease with heart failure and stage 1 through stage 4 chronic kidney disease, or unspecified chronic kidney disease (HCC)    CKD stage 3a, GFR 45-59 ml/min (HCC)    Type 2 diabetes mellitus with mild nonproliferative diabetic retinopathy without macular edema, bilateral (HCC)    Hypertensive emergency      PLAN:  1- 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 Cr baseline ~ 1.5 mg/dL prior to this.   Supposed to follow with Dr. Chung in office (last seen 11/2023). Etiology of KOLBY likely 2/2 contrast induced nephropathy in the setting of losartan use.              - improved Cr  ,Continue with IVF. IVF adjusted .               - s/p gill placement . Will need void trail pre dc home .               - Off  losartan              - Avoid nephrotoxic agents              - No indication for RRT     2- Non anion gap metabolic acidosis: 2/2 KOLBY/CKD              - resolved .      3- Chest pain               - Management per cardiology     4- R renal artery stenosis  Hypertension              - Pt supposed to have appt with vascular surgery tomorrow              - Cardiology following . Plan per cards .               - Hold losartan in the setting of KOLBY              5- pAF s/p watchman     6- CKD-BMD              - labs reviewed       Will follow  ...       Corazon Hanson MD, FACP

## 2024-12-14 NOTE — PROGRESS NOTES
Patient had epistaxis when she got up to go to the bathroom.Pressure applied to the nose bridge by pinching it and it stopped bleeding.Notified NP,Daiana Reid advised for nasal clamping and PRN Phenylephrine nasal spray.    Electronically signed by Yancy Buck RN on 12/14/24 at 4:20 AM EST

## 2024-12-14 NOTE — PLAN OF CARE
Problem: Chronic Conditions and Co-morbidities  Goal: Patient's chronic conditions and co-morbidity symptoms are monitored and maintained or improved  Outcome: Progressing     Problem: Discharge Planning  Goal: Discharge to home or other facility with appropriate resources  Outcome: Progressing     Problem: Safety - Adult  Goal: Free from fall injury  Outcome: Progressing     Problem: Cardiovascular - Adult  Goal: Maintains optimal cardiac output and hemodynamic stability  Outcome: Progressing     Problem: Cardiovascular - Adult  Goal: Absence of cardiac dysrhythmias or at baseline  Outcome: Progressing     Problem: Pain  Goal: Verbalizes/displays adequate comfort level or baseline comfort level  Outcome: Progressing     Problem: ABCDS Injury Assessment  Goal: Absence of physical injury  Outcome: Progressing     Problem: Neurosensory - Adult  Goal: Achieves stable or improved neurological status  Outcome: Progressing     Problem: Respiratory - Adult  Goal: Achieves optimal ventilation and oxygenation  Outcome: Progressing     Problem: Skin/Tissue Integrity - Adult  Goal: Skin integrity remains intact  Outcome: Progressing     Problem: Genitourinary - Adult  Goal: Absence of urinary retention  Outcome: Progressing     Problem: Infection - Adult  Goal: Absence of infection at discharge  Outcome: Progressing     Problem: Metabolic/Fluid and Electrolytes - Adult  Goal: Electrolytes maintained within normal limits  Outcome: Progressing     Problem: Metabolic/Fluid and Electrolytes - Adult  Goal: Hemodynamic stability and optimal renal function maintained  Outcome: Progressing     Problem: Metabolic/Fluid and Electrolytes - Adult  Goal: Glucose maintained within prescribed range  Outcome: Progressing     Problem: Hematologic - Adult  Goal: Maintains hematologic stability  Outcome: Progressing     Problem: Anxiety  Goal: Will report anxiety at manageable levels  Description: INTERVENTIONS:  1. Administer medication as  identify pros/cons of alternative solutions  4. Provide information as requested by patient/family  5. Respect patient/family right to receive or not to receive information  6. Serve as a liaison between patient and family and health care team  7. Initiate Consults from Ethics, Palliative Care or initiate Family Care Conference as is appropriate  Outcome: Progressing     Problem: Confusion  Goal: Confusion, delirium, dementia, or psychosis is improved or at baseline  Description: INTERVENTIONS:  1. Assess for possible contributors to thought disturbance, including medications, impaired vision or hearing, underlying metabolic abnormalities, dehydration, psychiatric diagnoses, and notify attending LIP  2. [place] high risk fall precautions, as indicated  3. Provide frequent short contacts to provide reality reorientation, refocusing and direction  4. Decrease environmental stimuli, including noise as appropriate  5. Monitor and intervene to maintain adequate nutrition, hydration, elimination, sleep and activity  6. If unable to ensure safety without constant attention obtain sitter and review sitter guidelines with assigned personnel  7. Initiate Psychosocial CNS and Spiritual Care consult, as indicated  Outcome: Progressing

## 2024-12-14 NOTE — PROGRESS NOTES
systemic veins. Bones/Soft Tissues: Lumbar spine and sacrum appear stable. CTA PELVIS: Aorta/Iliacs: Bilateral iliac and femoral circulation patent with no aneurysm or dissection or significant occlusive disease.  Arterial calcification. Other: Urinary bladder appears unremarkable.  Status post hysterectomy. Bones/Soft Tissues: Bilateral hips, acetabula and pubic rami appear intact.     1. No evidence of thoracic or abdominal aortic aneurysm or dissection. 2. No evidence of acute pulmonary embolism. 3. No acute cardiopulmonary process. 4. 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 5. Moderate to severe right renal artery disease. 6. Status post cholecystectomy and hysterectomy. 7. Mild diverticulosis but no acute diverticulitis. 8. Mild subpleural fibrosis in the upper lobes. 9. Incidental atrial appendage device well positioned with exclusion of the atrial appendage. 10. Retroperitoneal venous collaterals. These likely connect the chest venous system to the abdominal systemic veins.     CT CHEST WO CONTRAST    Result Date: 12/8/2024  EXAMINATION: CTA OF THE CHEST, ABDOMEN AND PELVIS WITH CONTRAST; CT OF THE CHEST WITHOUT CONTRAST 12/7/2024 8:10 pm; 12/7/2024 8:08 pm TECHNIQUE: CTA of the chest, abdomen and pelvis was performed after the administration of intravenous contrast.  Multiplanar reformatted images are provided for review.  MIP images are provided for review. Automated exposure control, iterative reconstruction, and/or weight based adjustment of the mA/kV was utilized to reduce the radiation dose to as low as reasonably achievable.; CT of the chest was performed without the administration of intravenous contrast. Multiplanar reformatted images are provided for review. Automated exposure control, iterative reconstruction, and/or weight based adjustment of the mA/kV was utilized to reduce the radiation dose to as low as reasonably achievable. COMPARISON: None HISTORY: ORDERING SYSTEM  6.8   HGB 8.7* 8.9* 9.0*    175 171     BMP:    Recent Labs     12/12/24  0611 12/13/24  0608    139   K 4.0 3.8    103   CO2 20* 20*   BUN 50* 42*   CREATININE 2.9* 2.5*   GLUCOSE 157* 58*     Hepatic: No results for input(s): \"AST\", \"ALT\", \"BILITOT\", \"ALKPHOS\" in the last 72 hours.    Invalid input(s): \"ALB\"  Lipids:   Lab Results   Component Value Date/Time    CHOL 125 12/08/2024 06:15 AM    HDL 47 12/08/2024 06:15 AM    HDL 58 05/14/2012 03:27 PM    TRIG 86 12/08/2024 06:15 AM     Hemoglobin A1C:   Lab Results   Component Value Date/Time    LABA1C 10.0 12/09/2024 05:39 AM     TSH:   Lab Results   Component Value Date/Time    TSH 1.67 12/08/2024 01:41 AM     Troponin: No results found for: \"TROPONINT\"  Lactic Acid:   Recent Labs     12/12/24  1035   LACTA 1.5     BNP:   Recent Labs     12/13/24  0608   PROBNP 7,674*     UA:  Lab Results   Component Value Date/Time    NITRU Negative 12/12/2024 02:38 AM    COLORU Yellow 12/12/2024 02:38 AM    PHUR 5.0 12/12/2024 02:38 AM    PHUR 5.5 04/05/2024 11:35 AM    LABCAST 20-40 Hyaline 07/06/2017 10:42 PM    WBCUA 21 12/12/2024 02:38 AM    RBCUA 218 12/12/2024 02:38 AM    MUCUS 1+ 05/23/2013 01:27 PM    YEAST Rare Yeast 05/14/2012 03:29 PM    BACTERIA None Seen 12/12/2024 02:38 AM    CLARITYU CLOUDY 12/12/2024 02:38 AM    LEUKOCYTESUR MODERATE 12/12/2024 02:38 AM    UROBILINOGEN 0.2 12/12/2024 02:38 AM    BILIRUBINUR Negative 12/12/2024 02:38 AM    BLOODU SMALL 12/12/2024 02:38 AM    GLUCOSEU Negative 12/12/2024 02:38 AM    GLUCOSEU NEGATIVE 05/14/2012 03:29 PM    KETUA TRACE 12/12/2024 02:38 AM    AMORPHOUS 2+ 08/13/2024 02:01 PM     Urine Cultures:   Lab Results   Component Value Date/Time    LABURIN No growth at 18 to 36 hours 12/12/2024 02:38 AM     Blood Cultures:   Lab Results   Component Value Date/Time    BC  12/12/2024 06:11 AM     No Growth to date.  Any change in status will be called.     Lab Results   Component Value Date/Time    BLOODCULT2

## 2024-12-15 LAB
ALBUMIN SERPL-MCNC: 3.6 G/DL (ref 3.4–5)
ANION GAP SERPL CALCULATED.3IONS-SCNC: 16 MMOL/L (ref 3–16)
BUN SERPL-MCNC: 30 MG/DL (ref 7–20)
CALCIUM SERPL-MCNC: 9.7 MG/DL (ref 8.3–10.6)
CHLORIDE SERPL-SCNC: 98 MMOL/L (ref 99–110)
CO2 SERPL-SCNC: 22 MMOL/L (ref 21–32)
CREAT SERPL-MCNC: 1.8 MG/DL (ref 0.6–1.2)
DEPRECATED RDW RBC AUTO: 14.8 % (ref 12.4–15.4)
GFR SERPLBLD CREATININE-BSD FMLA CKD-EPI: 30 ML/MIN/{1.73_M2}
GLUCOSE BLD-MCNC: 170 MG/DL (ref 70–99)
GLUCOSE BLD-MCNC: 186 MG/DL (ref 70–99)
GLUCOSE BLD-MCNC: 204 MG/DL (ref 70–99)
GLUCOSE BLD-MCNC: 85 MG/DL (ref 70–99)
GLUCOSE SERPL-MCNC: 63 MG/DL (ref 70–99)
HCT VFR BLD AUTO: 28.4 % (ref 36–48)
HGB BLD-MCNC: 9.3 G/DL (ref 12–16)
MCH RBC QN AUTO: 31.2 PG (ref 26–34)
MCHC RBC AUTO-ENTMCNC: 32.8 G/DL (ref 31–36)
MCV RBC AUTO: 94.9 FL (ref 80–100)
PERFORMED ON: ABNORMAL
PERFORMED ON: NORMAL
PHOSPHATE SERPL-MCNC: 2.4 MG/DL (ref 2.5–4.9)
PLATELET # BLD AUTO: 143 K/UL (ref 135–450)
PLATELET BLD QL SMEAR: ADEQUATE
PMV BLD AUTO: 9.8 FL (ref 5–10.5)
POTASSIUM SERPL-SCNC: 3.9 MMOL/L (ref 3.5–5.1)
RBC # BLD AUTO: 2.99 M/UL (ref 4–5.2)
SLIDE REVIEW: ABNORMAL
SODIUM SERPL-SCNC: 136 MMOL/L (ref 136–145)
WBC # BLD AUTO: 8.5 K/UL (ref 4–11)

## 2024-12-15 PROCEDURE — 94760 N-INVAS EAR/PLS OXIMETRY 1: CPT

## 2024-12-15 PROCEDURE — 6360000002 HC RX W HCPCS: Performed by: HOSPITALIST

## 2024-12-15 PROCEDURE — 6370000000 HC RX 637 (ALT 250 FOR IP): Performed by: HOSPITALIST

## 2024-12-15 PROCEDURE — 6360000002 HC RX W HCPCS: Performed by: STUDENT IN AN ORGANIZED HEALTH CARE EDUCATION/TRAINING PROGRAM

## 2024-12-15 PROCEDURE — 6370000000 HC RX 637 (ALT 250 FOR IP): Performed by: INTERNAL MEDICINE

## 2024-12-15 PROCEDURE — 94640 AIRWAY INHALATION TREATMENT: CPT

## 2024-12-15 PROCEDURE — 2060000000 HC ICU INTERMEDIATE R&B

## 2024-12-15 PROCEDURE — 2580000003 HC RX 258: Performed by: REGISTERED NURSE

## 2024-12-15 PROCEDURE — 2580000003 HC RX 258: Performed by: HOSPITALIST

## 2024-12-15 PROCEDURE — 6360000002 HC RX W HCPCS: Performed by: REGISTERED NURSE

## 2024-12-15 PROCEDURE — 6370000000 HC RX 637 (ALT 250 FOR IP): Performed by: STUDENT IN AN ORGANIZED HEALTH CARE EDUCATION/TRAINING PROGRAM

## 2024-12-15 PROCEDURE — 36415 COLL VENOUS BLD VENIPUNCTURE: CPT

## 2024-12-15 PROCEDURE — 85027 COMPLETE CBC AUTOMATED: CPT

## 2024-12-15 PROCEDURE — 80069 RENAL FUNCTION PANEL: CPT

## 2024-12-15 RX ORDER — HYDRALAZINE HYDROCHLORIDE 20 MG/ML
10 INJECTION INTRAMUSCULAR; INTRAVENOUS EVERY 6 HOURS PRN
Status: DISCONTINUED | OUTPATIENT
Start: 2024-12-15 | End: 2024-12-16 | Stop reason: HOSPADM

## 2024-12-15 RX ORDER — BUTALBITAL, ACETAMINOPHEN AND CAFFEINE 50; 325; 40 MG/1; MG/1; MG/1
1 TABLET ORAL EVERY 4 HOURS PRN
Status: DISCONTINUED | OUTPATIENT
Start: 2024-12-15 | End: 2024-12-16 | Stop reason: HOSPADM

## 2024-12-15 RX ORDER — INSULIN GLARGINE 100 [IU]/ML
5 INJECTION, SOLUTION SUBCUTANEOUS NIGHTLY
Status: DISCONTINUED | OUTPATIENT
Start: 2024-12-15 | End: 2024-12-16 | Stop reason: HOSPADM

## 2024-12-15 RX ORDER — PROMETHAZINE HYDROCHLORIDE 25 MG/1
12.5 TABLET ORAL EVERY 6 HOURS PRN
Status: DISCONTINUED | OUTPATIENT
Start: 2024-12-15 | End: 2024-12-16 | Stop reason: HOSPADM

## 2024-12-15 RX ORDER — AMLODIPINE BESYLATE 10 MG/1
10 TABLET ORAL DAILY
Status: DISCONTINUED | OUTPATIENT
Start: 2024-12-15 | End: 2024-12-16 | Stop reason: HOSPADM

## 2024-12-15 RX ADMIN — INSULIN LISPRO 1 UNITS: 100 INJECTION, SOLUTION INTRAVENOUS; SUBCUTANEOUS at 21:39

## 2024-12-15 RX ADMIN — DULOXETINE HYDROCHLORIDE 60 MG: 60 CAPSULE, DELAYED RELEASE ORAL at 09:55

## 2024-12-15 RX ADMIN — HYDRALAZINE HYDROCHLORIDE 10 MG: 20 INJECTION INTRAMUSCULAR; INTRAVENOUS at 11:52

## 2024-12-15 RX ADMIN — RANOLAZINE 1000 MG: 500 TABLET, FILM COATED, EXTENDED RELEASE ORAL at 09:55

## 2024-12-15 RX ADMIN — ASPIRIN 81 MG: 81 TABLET, CHEWABLE ORAL at 09:57

## 2024-12-15 RX ADMIN — PREDNISONE 10 MG: 10 TABLET ORAL at 09:55

## 2024-12-15 RX ADMIN — INSULIN LISPRO 1 UNITS: 100 INJECTION, SOLUTION INTRAVENOUS; SUBCUTANEOUS at 13:21

## 2024-12-15 RX ADMIN — METOPROLOL SUCCINATE 50 MG: 50 TABLET, EXTENDED RELEASE ORAL at 09:57

## 2024-12-15 RX ADMIN — ONDANSETRON 4 MG: 4 TABLET, ORALLY DISINTEGRATING ORAL at 19:18

## 2024-12-15 RX ADMIN — TICAGRELOR 90 MG: 90 TABLET ORAL at 09:55

## 2024-12-15 RX ADMIN — HYDRALAZINE HYDROCHLORIDE 100 MG: 50 TABLET ORAL at 13:34

## 2024-12-15 RX ADMIN — LEVETIRACETAM 500 MG: 500 TABLET, FILM COATED ORAL at 09:55

## 2024-12-15 RX ADMIN — HYDRALAZINE HYDROCHLORIDE 10 MG: 20 INJECTION INTRAMUSCULAR; INTRAVENOUS at 03:48

## 2024-12-15 RX ADMIN — FLUTICASONE PROPIONATE 1 SPRAY: 50 SPRAY, METERED NASAL at 10:01

## 2024-12-15 RX ADMIN — AMITRIPTYLINE HYDROCHLORIDE 40 MG: 10 TABLET, FILM COATED ORAL at 21:27

## 2024-12-15 RX ADMIN — ACETAMINOPHEN 650 MG: 325 TABLET ORAL at 14:22

## 2024-12-15 RX ADMIN — ISOSORBIDE MONONITRATE 60 MG: 60 TABLET ORAL at 21:26

## 2024-12-15 RX ADMIN — SODIUM CHLORIDE, PRESERVATIVE FREE 10 ML: 5 INJECTION INTRAVENOUS at 21:34

## 2024-12-15 RX ADMIN — BUTALBITAL, ACETAMINOPHEN AND CAFFEINE 1 TABLET: 325; 50; 40 TABLET ORAL at 18:05

## 2024-12-15 RX ADMIN — QUETIAPINE FUMARATE 100 MG: 100 TABLET ORAL at 21:28

## 2024-12-15 RX ADMIN — RANOLAZINE 1000 MG: 500 TABLET, FILM COATED, EXTENDED RELEASE ORAL at 21:26

## 2024-12-15 RX ADMIN — CALCIUM CARBONATE-VITAMIN D TAB 500 MG-200 UNIT 1 TABLET: 500-200 TAB at 09:55

## 2024-12-15 RX ADMIN — PROMETHAZINE HYDROCHLORIDE 12.5 MG: 25 TABLET ORAL at 11:53

## 2024-12-15 RX ADMIN — HEPARIN SODIUM 5000 UNITS: 5000 INJECTION INTRAVENOUS; SUBCUTANEOUS at 09:56

## 2024-12-15 RX ADMIN — ATORVASTATIN CALCIUM 80 MG: 80 TABLET, FILM COATED ORAL at 21:29

## 2024-12-15 RX ADMIN — FERROUS SULFATE TAB 325 MG (65 MG ELEMENTAL FE) 325 MG: 325 (65 FE) TAB at 09:55

## 2024-12-15 RX ADMIN — WATER 1000 MG: 1 INJECTION INTRAMUSCULAR; INTRAVENOUS; SUBCUTANEOUS at 00:48

## 2024-12-15 RX ADMIN — Medication 2 PUFF: at 19:41

## 2024-12-15 RX ADMIN — TICAGRELOR 90 MG: 90 TABLET ORAL at 21:28

## 2024-12-15 RX ADMIN — OXYCODONE 5 MG: 5 TABLET ORAL at 21:38

## 2024-12-15 RX ADMIN — HYDRALAZINE HYDROCHLORIDE 100 MG: 50 TABLET ORAL at 09:57

## 2024-12-15 RX ADMIN — SODIUM CHLORIDE, PRESERVATIVE FREE 10 ML: 5 INJECTION INTRAVENOUS at 08:25

## 2024-12-15 RX ADMIN — OXYCODONE 5 MG: 5 TABLET ORAL at 12:23

## 2024-12-15 RX ADMIN — ISOSORBIDE MONONITRATE 60 MG: 60 TABLET ORAL at 10:01

## 2024-12-15 RX ADMIN — PANTOPRAZOLE SODIUM 40 MG: 40 TABLET, DELAYED RELEASE ORAL at 06:24

## 2024-12-15 RX ADMIN — HYDRALAZINE HYDROCHLORIDE 100 MG: 50 TABLET ORAL at 21:29

## 2024-12-15 RX ADMIN — ONDANSETRON 4 MG: 2 INJECTION INTRAMUSCULAR; INTRAVENOUS at 08:25

## 2024-12-15 RX ADMIN — AMLODIPINE BESYLATE 10 MG: 10 TABLET ORAL at 10:00

## 2024-12-15 RX ADMIN — Medication 2 PUFF: at 07:39

## 2024-12-15 RX ADMIN — SODIUM CHLORIDE, PRESERVATIVE FREE 10 ML: 5 INJECTION INTRAVENOUS at 11:53

## 2024-12-15 RX ADMIN — METOPROLOL SUCCINATE 50 MG: 50 TABLET, EXTENDED RELEASE ORAL at 21:28

## 2024-12-15 RX ADMIN — HEPARIN SODIUM 5000 UNITS: 5000 INJECTION INTRAVENOUS; SUBCUTANEOUS at 21:30

## 2024-12-15 RX ADMIN — LEVETIRACETAM 500 MG: 500 TABLET, FILM COATED ORAL at 21:29

## 2024-12-15 RX ADMIN — INSULIN GLARGINE 5 UNITS: 100 INJECTION, SOLUTION SUBCUTANEOUS at 21:31

## 2024-12-15 RX ADMIN — MONTELUKAST 10 MG: 10 TABLET, FILM COATED ORAL at 09:55

## 2024-12-15 ASSESSMENT — PAIN SCALES - GENERAL
PAINLEVEL_OUTOF10: 10
PAINLEVEL_OUTOF10: 5
PAINLEVEL_OUTOF10: 7
PAINLEVEL_OUTOF10: 3
PAINLEVEL_OUTOF10: 6
PAINLEVEL_OUTOF10: 5
PAINLEVEL_OUTOF10: 10

## 2024-12-15 ASSESSMENT — PAIN DESCRIPTION - ORIENTATION: ORIENTATION: MID;UPPER

## 2024-12-15 ASSESSMENT — PAIN DESCRIPTION - DESCRIPTORS
DESCRIPTORS: ACHING
DESCRIPTORS: ACHING;DISCOMFORT

## 2024-12-15 ASSESSMENT — PAIN DESCRIPTION - LOCATION
LOCATION: HEAD

## 2024-12-15 ASSESSMENT — PAIN SCALES - WONG BAKER
WONGBAKER_NUMERICALRESPONSE: NO HURT
WONGBAKER_NUMERICALRESPONSE: NO HURT

## 2024-12-15 NOTE — PLAN OF CARE
Problem: Chronic Conditions and Co-morbidities  Goal: Patient's chronic conditions and co-morbidity symptoms are monitored and maintained or improved  12/15/2024 1455 by Marifer White RN  Outcome: Progressing     Problem: Discharge Planning  Goal: Discharge to home or other facility with appropriate resources  12/15/2024 1455 by Marifer White RN  Outcome: Progressing     Problem: Safety - Adult  Goal: Free from fall injury  12/15/2024 1455 by Marifer White RN  Outcome: Progressing     Problem: Cardiovascular - Adult  Goal: Maintains optimal cardiac output and hemodynamic stability  12/15/2024 1455 by Marifer White RN  Outcome: Progressing     Problem: Cardiovascular - Adult  Goal: Absence of cardiac dysrhythmias or at baseline  12/15/2024 1455 by Marifer White RN  Outcome: Progressing     Problem: Pain  Goal: Verbalizes/displays adequate comfort level or baseline comfort level  12/15/2024 1455 by Marifer White RN  Outcome: Progressing     Problem: ABCDS Injury Assessment  Goal: Absence of physical injury  12/15/2024 1455 by Marifer White RN  Outcome: Progressing     Problem: Neurosensory - Adult  Goal: Achieves stable or improved neurological status  12/15/2024 1455 by Marifer White RN  Outcome: Progressing     Problem: Respiratory - Adult  Goal: Achieves optimal ventilation and oxygenation  12/15/2024 1455 by Marifer White RN  Outcome: Progressing     Problem: Skin/Tissue Integrity - Adult  Goal: Skin integrity remains intact  12/15/2024 1455 by Marifer White RN  Outcome: Progressing     Problem: Genitourinary - Adult  Goal: Absence of urinary retention  12/15/2024 1455 by Marifer White RN  Outcome: Progressing     Problem: Infection - Adult  Goal: Absence of infection at discharge  12/15/2024 1455 by Marifer White RN  Outcome: Progressing     Problem: Metabolic/Fluid and Electrolytes - Adult  Goal: Electrolytes maintained within normal limits  12/15/2024 1455 by Marifer White  RN  Outcome: Progressing     Problem: Metabolic/Fluid and Electrolytes - Adult  Goal: Hemodynamic stability and optimal renal function maintained  12/15/2024 1455 by Marifer White RN  Outcome: Progressing     Problem: Metabolic/Fluid and Electrolytes - Adult  Goal: Glucose maintained within prescribed range  12/15/2024 1455 by Marifer White RN  Outcome: Progressing     Problem: Hematologic - Adult  Goal: Maintains hematologic stability  12/15/2024 1455 by Marifer White RN  Outcome: Progressing     Problem: Anxiety  Goal: Will report anxiety at manageable levels  Description: INTERVENTIONS:  1. Administer medication as ordered  2. Teach and rehearse alternative coping skills  3. Provide emotional support with 1:1 interaction with staff  12/15/2024 1455 by Marifer White RN  Outcome: Progressing     Problem: Coping  Goal: Pt/Family able to verbalize concerns and demonstrate effective coping strategies  Description: INTERVENTIONS:  1. Assist patient/family to identify coping skills, available support systems and cultural and spiritual values  2. Provide emotional support, including active listening and acknowledgement of concerns of patient and caregivers  3. Reduce environmental stimuli, as able  4. Instruct patient/family in relaxation techniques, as appropriate  5. Assess for spiritual pain/suffering and initiate Spiritual Care, Psychosocial Clinical Specialist consults as needed  12/15/2024 1455 by Marifer White RN  Outcome: Progressing     Problem: Change in Body Image  Goal: Pt/Family communicate acceptance of loss or change in body image and feel psychological comfort and peace  Description: INTERVENTIONS:  1. Assess patient/family anxiety and grief process related to change in body image, loss of functional status, loss of sense of self, and forgiveness  2. Provide emotional and spiritual support  3. Provide information about the patient's health status with consideration of family and cultural  values  4. Communicate willingness to discuss loss and facilitate grief process with patient/family as appropriate  5. Emphasize sustaining relationships within family system and community, or luis/spiritual traditions  6. Initiate Spiritual Care, Psychosocial Clinical Specialist consult as needed  12/15/2024 1455 by Marifer White RN  Outcome: Progressing     Problem: Decision Making  Goal: Pt/Family able to effectively weigh alternatives and participate in decision making related to treatment and care  Description: INTERVENTIONS:  1. Determine when there are differences between patient's view, family's view, and healthcare provider's view of condition  2. Facilitate patient and family articulation of goals for care  3. Help patient and family identify pros/cons of alternative solutions  4. Provide information as requested by patient/family  5. Respect patient/family right to receive or not to receive information  6. Serve as a liaison between patient and family and health care team  7. Initiate Consults from Ethics, Palliative Care or initiate Family Care Conference as is appropriate  12/15/2024 1455 by Marifer White RN  Outcome: Progressing     Problem: Confusion  Goal: Confusion, delirium, dementia, or psychosis is improved or at baseline  Description: INTERVENTIONS:  1. Assess for possible contributors to thought disturbance, including medications, impaired vision or hearing, underlying metabolic abnormalities, dehydration, psychiatric diagnoses, and notify attending LIP  2. [place] high risk fall precautions, as indicated  3. Provide frequent short contacts to provide reality reorientation, refocusing and direction  4. Decrease environmental stimuli, including noise as appropriate  5. Monitor and intervene to maintain adequate nutrition, hydration, elimination, sleep and activity  6. If unable to ensure safety without constant attention obtain sitter and review sitter guidelines with assigned  personnel  7. Initiate Psychosocial CNS and Spiritual Care consult, as indicated  12/15/2024 [id number] by Marifer White, RN  Outcome: Progressing

## 2024-12-15 NOTE — PROGRESS NOTES
[facility]  Hypoglycemia Event and Prevention Plan      NAME: Maren Meza  MEDICAL RECORD NUMBER:  [id number]  AGE: 69 y.o.   GENDER: female  : 1955  EPISODE DATE:  12/15/2024     Data     Recent Labs     24  1948 24  0806 24  1206 24  1757 24  2013 12/15/24  0904   POCGLU 140* 135* 131* 132* 119* 85        Latest Reference Range & Units 12/15/24 05:31   Glucose 70 - 99 mg/dL 63 (L)   (L): Data is abnormally low    HgBA1c:    Lab Results   Component Value Date/Time    LABA1C 10.0 2024 05:39 AM       BUN/Creatinine:    Lab Results   Component Value Date/Time    BUN 30 12/15/2024 05:31 AM    CREATININE 1.8 12/15/2024 05:31 AM     GFR Estimated Creatinine Clearance: 21 mL/min (A) (based on SCr of 1.8 mg/dL (H)).    FIB-4 Calculation: 3.66 at 12/15/2024  5:31 AM  Calculated from:  SGOT/AST: 40 U/L at 2024  6:15 AM  SGPT/ALT: 27 U/L at 2024  6:15 AM  Platelets: 143 K/uL at 12/15/2024  5:31 AM  Age: 69 years    Medications  Scheduled Medications:   amLODIPine  10 mg Oral Daily    insulin glargine  5 Units SubCUTAneous Nightly    cefTRIAXone (ROCEPHIN) IV  1,000 mg IntraVENous Q24H    OLANZapine  5 mg IntraMUSCular Once    sodium chloride  250 mL IntraVENous Once    insulin lispro  0-4 Units SubCUTAneous 4x Daily AC & HS    heparin (porcine)  5,000 Units SubCUTAneous BID    hydrALAZINE  100 mg Oral TID    sodium chloride flush  5-40 mL IntraVENous 2 times per day    aspirin  81 mg Oral Daily    atorvastatin  80 mg Oral Nightly    amitriptyline  40 mg Oral Nightly    oy[place] shell calcium w/D  1 tablet Oral Daily with breakfast    DULoxetine  60 mg Oral Daily    ferrous sulfate  325 mg Oral Daily with breakfast    fluticasone  1 spray Each Nostril Daily    budesonide-formoterol  2 puff Inhalation BID RT    isosorbide mononitrate  60 mg Oral BID    levETIRAcetam  500 mg Oral BID    linaclotide  290 mcg Oral Once per day on     metoprolol

## 2024-12-15 NOTE — PROGRESS NOTES
Blood glucose on morning labs 63. Patient awake, A/O x4, asymptomatic. Provided patient with juice and jello which she ate and drank. Care ongoing. Bed alarm on, tele-sitter in place, call light within reach. Care ongoing.

## 2024-12-15 NOTE — PROGRESS NOTES
Patient A/O x4 with periods of confusion/hallucinations.  BP remains elevated this shift with PRN Hydralazine administered. Patient c/o headache controlled with PRN Oxycodone and Tylenol. C/O nausea. PRN Phenergan ordered this shift d/t no improvement with PRN Zofran. Patient with poor PO intake this shift. Ambulates to bathroom with walker with SB assist. Bed alarm on, tele-sitter in place, call light within reach. Care ongoing.

## 2024-12-15 NOTE — FLOWSHEET NOTE
12/15/24 1318   Treatment Team Notification   Reason for Communication   (ACCELERATED JUNCTIONAL RHYTHM. [facility] ADVISING WAS HAVING RUNS OF JUNCTIONAL RHYTHM T/O LAST NIGHT. QUESTIONING IF WANTING EKG. ADDITIONALLY, PATIENT SPEAKING WITH DAUGHTER NOT PRESENT. RECEIVED PRN OXYCODONE FOR C/O HEADACHE.)   Name of Team Member Notified DR. SARA VIERA   Treatment Team Role Attending Provider   Method of Communication Secure Message   Response Waiting for response   Notification Time 8020        Patient : Anabel Cuba Age: 45 year old Sex: female   MRN: [id number] Encounter Date: 2018    G15/G15    History     Chief Complaint   Patient presents with   • Hand Swelling     HPI Pt as historian.  2018    6:12 AM Anabel Cuba is a 45 year old female who presents to the ED c/o bilateral hand swelling that began this morning. The pt began experiencing bilateral hand swelling and paresthesias around 2:30 AM this morning, which prevented her from being able to completely close the hands. The swelling resolved upon her awakening a second time this morning; however, her paresthesias have persisted slightly but only in the LUE. She denies any visual changes, speech difficulty, weakness, CP, new SOB, or other pertinent symptoms. The pt is currently anticoagulated with Warfarin and is compliant with the medication. She notes she has follow up with her PCP this afternoon. There are no other alleviating or modifying factors at this time.     PCP: Arnulfo Cuba MD    No Known Allergies    Prior to Admission Medications    ATORVASTATIN (LIPITOR) 40 MG TABLET    Take 1 tablet by mouth nightly.    DISPENSE    Patient denies taking any prescription or OTC medications at the current time.    DOCUSATE SODIUM-SENNOSIDES (SENOKOT S) 50-8.6 MG PER TABLET    Take 2 tablets by mouth daily as needed for Constipation.    ENOXAPARIN (LOVENOX) 80 MG/0.8ML INJECTABLE SOLUTION    Inject 0.8 mLs into the skin every 12 hours for 5 doses.    ENOXAPARIN (LOVENOX) 80 MG/0.8ML INJECTABLE SOLUTION    Inject 0.8 mLs into the skin every 12 hours for 10 doses.    WARFARIN (COUMADIN) 5 MG TABLET    Take 1 tablet by mouth daily. And as per INR       Past Medical History:   Diagnosis Date   • Gastro-oesophageal reflux disease    • Postpartum Depression        Past Surgical History:   Procedure Laterality Date   •  DELIVERY+POSTPARTUM CARE       x 3   • LAPAROSCOPY,FULGUR OF OVIDUCTS      Tubal Ligation, by fulgration        Family History   Problem Relation Age of Onset   • Hypertension Mother    • Diabetes Father    • Hypertension Father    • Hyperlipidemia Father    • NEGATIVE FAMILY HX OF Sister    • NEGATIVE FAMILY HX OF Sister    • NEGATIVE FAMILY HX OF Sister    • NEGATIVE FAMILY HX OF Sister    • NEGATIVE FAMILY HX OF Brother    • NEGATIVE FAMILY HX OF Brother    • NEGATIVE FAMILY HX OF Brother    • NEGATIVE FAMILY HX OF Brother    • NEGATIVE FAMILY HX OF Brother    • NEGATIVE FAMILY HX OF Brother        Social History   Substance Use Topics   • Smoking status: Never Smoker   • Smokeless tobacco: Never Used   • Alcohol use No       Review of Systems   Constitutional: Negative for fever.   HENT: Negative for rhinorrhea and sore throat.    Eyes: Negative for visual disturbance.   Respiratory: Negative for cough and shortness of breath.    Cardiovascular: Negative for chest pain.   Gastrointestinal: Negative for abdominal pain, diarrhea, nausea and vomiting.   Genitourinary: Negative for dysuria.   Musculoskeletal: Negative for back pain.        Positive for bilateral hand swelling (resolved).   Skin: Negative for rash.   Neurological: Negative for speech difficulty, weakness and headaches.        Positive for paresthesias to the left hand.       Physical Exam     ED Triage Vitals [08/16/18 0531]   ED Triage Vitals Group      Temp 97.6 °F (36.4 °C)      Pulse 70      Resp 14      /82      SpO2 97 %      EtCO2 mmHg       Height 5' 2\" (1.575 m)      Weight 168 lb (76.2 kg)      Weight Scale Used ED Stated       Physical Exam   Constitutional: She is oriented to person, place, and time. She appears well-developed and well-nourished.   HENT:   Head: Normocephalic and atraumatic.   Nose: Nose normal.   Eyes: Pupils are equal, round, and reactive to light. Conjunctivae are normal. Right eye exhibits no discharge. Left eye exhibits no discharge.   Neck: Normal range of motion. Neck supple.   Cardiovascular: Normal rate,  regular rhythm and normal heart sounds.    No appreciable swelling in the BUE or bilateral hands.  Radial pulses are equal and intact bilaterally.   Pulmonary/Chest: Effort normal and breath sounds normal. No respiratory distress.   Abdominal: Soft. She exhibits no distension. There is no tenderness. There is no guarding.   Musculoskeletal: Normal range of motion.   Neurological: She is alert and oriented to person, place, and time. GCS eye subscore is 4. GCS verbal subscore is 5. GCS motor subscore is 6.   No finger to nose dysmetria.  No pronator drift.  Nl sensation throughout all extremities.   Nl strength throughout all extremities.   No aphasia, no dysarthria.   Sensation to the median, ulnar, and radial nerve distributions is equal and intact bilaterally.   Skin: Skin is warm and dry. She is not diaphoretic.   Psychiatric: She has a normal mood and affect. Judgment and thought content normal.   Nursing note and vitals reviewed.      ED Course     Procedures    Lab Results     Results for orders placed or performed during the hospital encounter of 08/16/18   Prothrombin Time   Result Value Ref Range    PROTIME 20.3 (H) 9.7 - 11.8 sec    INR 2.0    CBC & Auto Differential   Result Value Ref Range    WBC 6.3 4.2 - 11.0 K/mcL    RBC 4.08 4.00 - 5.20 mil/mcL    HGB 8.9 (L) 12.0 - 15.5 g/dL    HCT 30.1 (L) 36.0 - 46.5 %    MCV 73.8 (L) 78.0 - 100.0 fl    MCH 21.8 (L) 26.0 - 34.0 pg    MCHC 29.6 (L) 32.0 - 36.5 g/dL    RDW-CV 15.9 (H) 11.0 - 15.0 %     140 - 450 K/mcL    NRBC 0 0 /100 WBC    DIFF TYPE AUTOMATED DIFFERENTIAL     Neutrophil 71 %    LYMPH 18 %    MONO 6 %    EOSIN 4 %    BASO 1 %    PERCENT IMMATURE GRANULOCYTES 0 %    Absolute Neutrophil 4.5 1.8 - 7.7 K/mcL    Absolute Lymph 1.2 1.0 - 4.8 K/mcL    Absolute Mono 0.4 0.3 - 0.9 K/mcL    Absolute Eos 0.3 0.1 - 0.5 K/mcL    Absolute Baso 0.0 0.0 - 0.3 K/mcL    Absolute Immature Granulocytes 0.0 0 - 0.2 K/mcl   Basic Metabolic Panel   Result Value  Ref Range    Sodium 143 135 - 145 mmol/L    Potassium 3.7 3.4 - 5.1 mmol/L    Chloride 110 (H) 98 - 107 mmol/L    Carbon Dioxide 25 21 - 32 mmol/L    Anion Gap 12 10 - 20 mmol/L    Glucose 98 65 - 99 mg/dL    BUN 7 6 - 20 mg/dL    Creatinine 0.60 0.51 - 0.95 mg/dL    GFR Estimate,  >90     GFR Estimate, Non African American >90     BUN/Creatinine Ratio 12 7 - 25    CALCIUM 8.5 8.4 - 10.2 mg/dL       Radiology Results     Imaging Results          US Upper Extremity Arteries Left (Final result)  Result time 08/16/18 09:16:10    Final result                 Impression:    IMPRESSION:     No hemodynamically significant stenosis in the left upper extremity  arterial system. All vessels appear to be widely patent. If there is  ongoing concern for hemodynamically significant stenosis however, further  evaluation with CTA can be considered.      I have personally reviewed the images and agree with the resident's  interpretation.               Narrative:    US UPPER EXTREMITY ARTERIES DUPLEX LEFT    HISTORY: Swelling, known stenosis of the subclavian artery.    COMPARISON: Prior left upper extremity arterial duplex ultrasound  7/22/2018.    TECHNIQUE:  Grayscale, color Doppler and spectral duplex images were  obtained of the left upper extremity arterial system and carotid artery.   FINDINGS:     Color Doppler flow and duplex images of the left upper extremity show  patent subclavian, axillary, brachial, radial, and ulnar arteries. No  significant atherosclerotic plaque is seen. Duplex Doppler waveforms are  satisfactory. No significant elevation of peak systolic velocity is  demonstrated within the visualized segments.    Left Peak Systolic Velocities (cm/sec):  Common carotid: 91.9 cm/s  Proximal subclavian: 175.3 cm/s  Distal subclavian: 141.9 cm/s  Axillary: 111.2 cm/s  Proximal Brachial: 104.3 cm/s  Mid Brachial: 94.4 cm/s  Distal Brachial: 88 cm/s  Proximal radial: 33.9 cm/s  Mid radial: 45.3  cm/s  Distal radial: 39.5 cm/s  Proximal ulnar: 50.0 cm/s  Mid ulnar: 36.3 cm/s  Distal ulnar: 38.2 cm/s     WAVEFORMS:   There are triphasic waveforms distal to the previously noted stenosis.  Spectral broadening of the proximal left subclavian artery may indicate  tortuosity at this segment.                      Preliminary result                 Impression:         No hemodynamically significant stenosis in the left upper extremity  arterial system. If there is ongoing concern for hemodynamically  significant stenosis, further evaluation with CTA can be considered.                 Narrative:    US UPPER EXTREMITY ARTERIES DUPLEX LEFT    HISTORY: Swelling, known stenosis of the subclavian artery.    COMPARISON: Prior left upper extremity arterial duplex ultrasound  7/22/2018.    TECHNIQUE:  Grayscale, color Doppler and spectral duplex images were  obtained of the left upper extremity arterial system and carotid artery.   FINDINGS:     Color Doppler flow and duplex images of the left upper extremity show  patent subclavian, axillary, brachial, radial, and ulnar arteries. No  significant atherosclerotic plaque is seen. Duplex Doppler waveforms are  satisfactory. No significant elevation of peak systolic velocity is  demonstrated within the visualized segments.    Left Peak Systolic Velocities (cm/sec):  Common carotid: 91.9 cm/s  Proximal subclavian: 175.3 cm/s  Distal subclavian: 141.9 cm/s  Axillary: 111.2 cm/s  Proximal Brachial: 104.3 cm/s  Mid Brachial: 94.4 cm/s  Distal Brachial: 88 cm/s  Proximal radial: 33.9 cm/s  Mid radial: 45.3 cm/s  Distal radial: 39.5 cm/s  Proximal ulnar: 50.0 cm/s  Mid ulnar: 36.3 cm/s  Distal ulnar: 38.2 cm/s     WAVEFORMS:   There are triphasic waveforms distal to the previously noted stenosis.  Spectral broadening of the proximal left subclavian artery may indicate  tortuosity at this segment.                                   US Upper Extremity Venous Duplex Left (Final result)   Result time 08/16/18 09:15:59    Final result                 Impression:    IMPRESSION:   Negative for DVT of the left upper extremity.    I have personally reviewed the images and agree with the resident's  interpretation.               Narrative:    US UPPER EXTREMITY VENOUS DUPLEX LEFT    CLINICAL INFORMATION: Bilateral hand swelling that began this morning.  Associated diagnosis of: known DVT.    COMPARISON: None.    FINDINGS: Real-time duplex venous ultrasound is performed of the LEFT upper  extremity deep venous system.   Veins are interrogated with grey scale  B-mode, color-flow, and spectral Doppler with real time documentation of  compressibility and augmentation where applicable.      Internal Jugular Vein:  Patent.  Subclavian Vein:  Patent.  Axillary Vein:  Patent.  Brachial Vein(s):  Patent.  Basilic Vein:  Patent.  Cephalic Vein:  Patent.                    Preliminary result                 Impression:       Negative for DVT of the left upper extremity.               Narrative:    US UPPER EXTREMITY VENOUS DUPLEX LEFT    CLINICAL INFORMATION: Bilateral hand swelling that began this morning.  Associated diagnosis of: known DVT.    COMPARISON: None.    FINDINGS: Real-time duplex venous ultrasound is performed of the LEFT   upper  extremity deep venous system.   Veins are interrogated with grey scale  B-mode, color-flow, and spectral Doppler with real time documentation of  compressibility and augmentation where applicable.      Internal Jugular Vein:  Patent.  Subclavian Vein:  Patent.  Axillary Vein:  Patent.  Brachial Vein(s):  Patent.  Basilic Vein:  Patent.  Cephalic Vein:  Patent.                                  ED Medication Orders     None          MDM    Vitals  Vitals:    08/16/18 0531 08/16/18 0917   BP: 137/82 105/67   Pulse: 70 68   Resp: 14 14   Temp: 97.6 °F (36.4 °C)    TempSrc: Oral    SpO2: 97% 97%   Weight: 76.2 kg    Height: 5' 2\" (1.575 m)          ED Course  6:09 AM [Epic Review]  I reviewed the pt's medications, allergies, and past medical and surgical history. 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 She was admitted from 07/17/18 to 07/26/18 for concerns of an ischemic stroke but possible thrombus of the left subclavian artery.    6:18 AM During the initial encounter, the pt was assessed and evaluated. The pt presents with c/o bilateral hand swelling and LUE paresthesias that began this morning. She has a h/o subclavian artery thrombosis and was recently treated as an inpatient. Plan will be to evaluate with lab work and consult my attending physician regarding imaging. Pt understands and agrees with plan. All questions and concerns addressed.    6:38 AM Physician Consultation: I spoke with my attending physician, Dr.Christopher Narayan, and informed him of the patient's presentation and the Emergency department work up, including the pt's LUE paresthesias and resolved bilateral hand swelling this morning. I informed him of her h/o subclavian artery thrombosis with recent inpatient treatment as well. We discussed and agreed upon further plan of care, including obtaining an US of the LUE.    6:51 AM Patient Recheck: The pt is resting comfortably. I informed her of my consult with Dr. Narayan and we discussed plans for obtaining a LUE US. Pt understands and agrees with plan. All questions and concerns addressed.    9:09 AM Patient Recheck: The pt is resting comfortably and reports no new complaints. I informed the pt of her unremarkable lab and US results, indicating an INR of 2.0 and no evidence of DVT in the LUE. We discussed that there is no clinically significant arterial stenosis of her subclavian artery.  We discussed further plan of care, including close follow up with her PCP. Return to ED instructions given for, but not limited to, any increased swelling, increased pain, or new or worsening symptoms. Pt understands and agrees with plan. All questions and concerns  addressed.      MDM  The patient was provided with a recommendation to follow up with a primary care provider and obtain reassessment of his/her blood pressure within three months.    Clinical Impression  The encounter diagnosis was Bilateral hand swelling.    Follow-up  Arnulfo Cuba MD  [address] [facility] [address]  [facility] [id number] [phone]    Schedule an appointment as soon as possible for a visit  for further evaluation next week. return to the ER for increased swelling, pain, weakness, or other symptoms.      Pt is discharged in stable condition.    Critical Care time spent on this patient outside of billable procedures:  None        I have reviewed the information recorded by the scribe for accuracy and agree with its contents.    Ele Man acting as scribe for Enma Thibodeaux PA-C    Physician's Assistant: Enma Thibodeaux  SER #: [id number]  Attending Physician: Jerry Narayan  Physician #: [id number]  Scribe: Ele Thibodeaux PA-C  08/16/18 1008

## 2024-12-15 NOTE — FLOWSHEET NOTE
12/15/24 1318   Treatment Team Notification   Reason for Communication   (ACCELERATED JUNCTIONAL RHYTHM. [facility] ADVISING WAS HAVING RUNS OF JUNCTIONAL RHYTHM T/O LAST NIGHT. QUESTIONING IF WANTING EKG. ADDITIONALLY, PATIENT SPEAKING WITH DAUGHTER NOT PRESENT. RECEIVED PRN OXYCODONE FOR C/O HEADACHE.)   Name of Team Member Notified DR. SARA VIERA   Treatment Team Role Attending Provider   Method of Communication Secure Message   Response Waiting for response   Notification Time 1328     12/15/2024 @ 1351: Dr. Headley responded via [employer]. Advised intermittent confusion has been ongoing. Cardio has evaluated patient. Tramadol and Zyprexa D/C'd. See MAR. Electronically signed by Marifer White RN on 12/15/2024 at 2:20 PM

## 2024-12-15 NOTE — PROGRESS NOTES
Department of Internal Medicine  Nephrology Progress Note        Reason for consultation: KOLBY      History of Present Illness: Maren Meza is a 70 yo female with a PMHx of CKD 3b/4, h/o KOLBY, HTN, afib s/p watchman, CAD, CHF, COPD, DM2, HLD. Patient presents to  ED on 12/7/2024 with complaints of chest pain and SOB. Troponins 54>46. CTA Chest abdomen is negative for acute findings. Imaging does reveal moderate to severe R renal artery disease. Patient is supposed to have an appt with vascular surgery tomorrow. Cardiology saw pt this admission and is going discuss with Dr. Allred.         Saez cath removed.   Events noted . Labs reviewed   REVIEW OF SYSTEMS:  Resting , no acute complaints     No family present     Physical Exam:    VITALS:  BP (!) 169/79   Pulse 78   Temp 98.6 °F (37 °C) (Oral)   Resp 20   Ht 1.524 m (5')   Wt 50.3 kg (110 lb 14.3 oz)   SpO2 92%   BMI 21.66 kg/m²   24HR INTAKE/OUTPUT:    Intake/Output Summary (Last 24 hours) at 12/15/2024 1404  Last data filed at 12/15/2024 1123  Gross per 24 hour   Intake 876.52 ml   Output 800 ml   Net 76.52 ml       Constitutional: Looks comfortable   Respiratory:  CTA  Gastrointestinal:  No  tenderness.  Normal Bowel Sounds  Cardiovascular:  S1, S2 Irregular   Edema:   +  edema    DATA:    CBC:  Lab Results   Component Value Date/Time    WBC 8.5 12/15/2024 05:31 AM    RBC 2.99 12/15/2024 05:31 AM    HGB 9.3 12/15/2024 05:31 AM    HCT 28.4 12/15/2024 05:31 AM    MCV 94.9 12/15/2024 05:31 AM    MCH 31.2 12/15/2024 05:31 AM    MCHC 32.8 12/15/2024 05:31 AM    RDW 14.8 12/15/2024 05:31 AM     12/15/2024 05:31 AM    MPV 9.8 12/15/2024 05:31 AM     CMP:  Lab Results   Component Value Date/Time     12/15/2024 05:31 AM    K 3.9 12/15/2024 05:31 AM    K 3.8 12/07/2024 08:25 PM    CL 98 12/15/2024 05:31 AM    CO2 22 12/15/2024 05:31 AM    BUN 30 12/15/2024 05:31 AM    CREATININE 1.8 12/15/2024 05:31 AM    GFRAA 46 10/09/2022 06:18 AM    GFRAA 60  renal artery stenosis  Hypertension              - Pt supposed to have appt with vascular surgery tomorrow              - Cardiology following . Plan per cards .               - Hold losartan in the setting of KOLBY              5- pAF s/p watchman     6- CKD-BMD              - labs reviewed       Out pt f/u with Dr Chung advised .       Corazon Hanson MD, FACP

## 2024-12-15 NOTE — PROGRESS NOTES
V2.0    [facility] [place]      Name:  Maren Meza /Age/Sex: 1955  (69 y.o. female)   MRN & CSN:  [id number] & [id number] Encounter Date/Time: 12/15/2024 8:54 AM EST   Location:  [id number]-1006/5118-01 PCP: Marin Cardenas MD     Attending:Kayode Bush DO       Hospital Day: 9  Brief Hospital Course  Maren Meza is a 69 y.o. female with pertinent PMHx of CHF, CKD 3, T2DM, paroxysmal A-fib, HTN who presented with chest heaviness and shortness of breath was admitted with significantly elevated blood pressure.  Hospital course was complicated by an KOLBY on CKD 4.  Assessment & Plan     Essential hypertension  Renal artery stenosis  -Renal duplex nondiagnostic secondary to patient movement  -Hydralazine 100 mg 3 times daily  -Metoprolol 50 mg twice daily  -Increase amlodipine to 10 mg daily  -Outpatient follow-up with Dr. Allred    Atypical chest pain  -Chronic, recurrent problem  -Imdur and Ranexa  -Evaluated by Cardiology, no ischemic evaluation recommended    Urinary tract infection  -Urine culture with no growth  -Continue IV Rocephin 1 g daily    Chronic diastolic heart failure without acute exacerbation  -Echo on  showed EF 55 to 60% with grade 2 diastolic dysfunction    KOLBY on chronic kidney disease stage IV  -Suspected contrast-induced nephropathy, creatinine continues to improve  -Saez catheter removed  -Continue IV fluids per nephrology  -Nephrology following    Paroxysmal atrial fibrillation  -S/p Watchman    Type 2 diabetes  -Having hypoglycemia episodes recent  -Decrease Lantus to 5 units daily  -Discontinue prandial lispro  -Low-dose sliding scale correctional insulin 4 times daily with meals and nightly  -Check point-of-care glucose 4 times daily with meals and nightly  -Diabetes education following    COPD without acute exacerbation  -Symbicort  -Continue chronic prednisone 10 mg daily    Seizure disorder  -Continue Keppra    Diet ADULT DIET; Regular; 4 carb choices (60 gm/meal);      12/12/24  1239 12/14/24  1301 12/15/24  0531   WBC 7.0 6.8 8.5   HGB 8.9* 9.0* 9.3*    171 143     BMP:    Recent Labs     12/13/24  0608 12/14/24  1301 12/15/24  0531    138 136   K 3.8 3.4* 3.9    98* 98*   CO2 20* 24 22   BUN 42* 32* 30*   CREATININE 2.5* 2.0* 1.8*   GLUCOSE 58* 121* 63*     Hepatic: No results for input(s): \"AST\", \"ALT\", \"BILITOT\", \"ALKPHOS\" in the last 72 hours.    Invalid input(s): \"ALB\"  Lipids:   Lab Results   Component Value Date/Time    CHOL 125 12/08/2024 06:15 AM    HDL 47 12/08/2024 06:15 AM    HDL 58 05/14/2012 03:27 PM    TRIG 86 12/08/2024 06:15 AM     Hemoglobin A1C:   Lab Results   Component Value Date/Time    LABA1C 10.0 12/09/2024 05:39 AM     TSH:   Lab Results   Component Value Date/Time    TSH 1.67 12/08/2024 01:41 AM     Troponin: No results found for: \"TROPONINT\"  Lactic Acid:   No results for input(s): \"LACTA\" in the last 72 hours.    BNP:   Recent Labs     12/13/24  0608   PROBNP 7,674*     UA:  Lab Results   Component Value Date/Time    NITRU Negative 12/14/2024 05:05 PM    COLORU Yellow 12/14/2024 05:05 PM    PHUR 8.0 12/14/2024 05:05 PM    PHUR 5.5 04/05/2024 11:35 AM    LABCAST 20-40 Hyaline 07/06/2017 10:42 PM    WBCUA 1 12/14/2024 05:05 PM    RBCUA 3 12/14/2024 05:05 PM    MUCUS 1+ 05/23/2013 01:27 PM    YEAST Rare Yeast 05/14/2012 03:29 PM    BACTERIA None Seen 12/14/2024 05:05 PM    CLARITYU Clear 12/14/2024 05:05 PM    LEUKOCYTESUR Negative 12/14/2024 05:05 PM    UROBILINOGEN 0.2 12/14/2024 05:05 PM    BILIRUBINUR Negative 12/14/2024 05:05 PM    BLOODU Negative 12/14/2024 05:05 PM    GLUCOSEU Negative 12/14/2024 05:05 PM    GLUCOSEU NEGATIVE 05/14/2012 03:29 PM    KETUA TRACE 12/14/2024 05:05 PM    AMORPHOUS 2+ 08/13/2024 02:01 PM     Urine Cultures:   Lab Results   Component Value Date/Time    LABURIN No growth at 18 to 36 hours 12/12/2024 02:38 AM     Blood Cultures:   Lab Results   Component Value Date/Time    BC  12/12/2024 06:11 AM      No Growth to date.  Any change in status will be called.     Lab Results   Component Value Date/Time    BLOODCULT2  12/12/2024 12:39 PM     No Growth to date.  Any change in status will be called.     Organism:   Lab Results   Component Value Date/Time    ORG Aerococcus species 06/04/2024 01:12 AM         Electronically signed by Kayode Bush DO on 12/15/2024 at 12:36 PM

## 2024-12-15 NOTE — PROGRESS NOTES
BP has remained elevated. Had made a slight drop after HS medications given. Was greater than 170 SBP again with 4 am VS. Medicated with PRN hydralazine per orders.Devorah Escoto RN

## 2024-12-15 NOTE — FLOWSHEET NOTE
12/15/24 1126   Treatment Team Notification   Reason for Communication   (PATIENT C/O NAUSEA DESPITE PRN ZOFRAN. RQUESTING NAUSEA MEDS. REQUESTING CLARIFICATION ON PRN HYDRALAZINE ORDER.)   Name of Team Member Notified DR. SARA VIERA   Treatment Team Role Attending Provider   Method of Communication Secure Message   Response Waiting for response   Notification Time 1126     12/15/2024 @ 1131: Dr. Viera placed order for PRN Phenergan. Also clarified that PRN Hydralazine is for either a SBP >180 or a DBP >110. Electronically signed by Marifer White RN on 12/15/2024 at 11:49 AM

## 2024-12-15 NOTE — PLAN OF CARE
Problem: Chronic Conditions and Co-morbidities  Goal: Patient's chronic conditions and co-morbidity symptoms are monitored and maintained or improved  12/15/2024 0145 by Devorah Escoto RN  Outcome: Progressing  Flowsheets (Taken 12/15/2024 0145)  Care Plan - Patient's Chronic Conditions and Co-Morbidity Symptoms are Monitored and Maintained or Improved:   Monitor and assess patient's chronic conditions and comorbid symptoms for stability, deterioration, or improvement   Collaborate with multidisciplinary team to address chronic and comorbid conditions and prevent exacerbation or deterioration     Problem: Discharge Planning  Goal: Discharge to home or other facility with appropriate resources  12/15/2024 0145 by Devorah Escoto, RN  Outcome: Progressing  Flowsheets (Taken 12/15/2024 0145)  Discharge to home or other facility with appropriate resources:   Identify barriers to discharge with patient and caregiver   Arrange for needed discharge resources and transportation as appropriate   Identify discharge learning needs (meds, wound care, etc)     Problem: Safety - Adult  Goal: Free from fall injury  12/15/2024 0145 by Devorah Escoto RN  Outcome: Progressing  Flowsheets (Taken 12/15/2024 0145)  Free From Fall Injury: Instruct family/caregiver on patient safety     Problem: Cardiovascular - Adult  Goal: Maintains optimal cardiac output and hemodynamic stability  12/15/2024 0145 by Devorah Escoto RN  Outcome: Progressing  Flowsheets (Taken 12/15/2024 0145)  Maintains optimal cardiac output and hemodynamic stability:   Monitor blood pressure and heart rate   Monitor urine output and notify Licensed Independent Practitioner for values outside of normal range   Assess for signs of decreased cardiac output     Problem: Cardiovascular - Adult  Goal: Absence of cardiac dysrhythmias or at baseline  12/15/2024 0145 by Devorah Escoto, RN  Outcome: Progressing  Flowsheets (Taken 12/15/2024  0145)  Absence of cardiac dysrhythmias or at baseline:   Monitor cardiac rate and rhythm   Assess for signs of decreased cardiac output     Problem: Pain  Goal: Verbalizes/displays adequate comfort level or baseline comfort level  12/15/2024 0145 by Devorah Escoto RN  Outcome: Progressing  Flowsheets (Taken 12/15/2024 0145)  Verbalizes/displays adequate comfort level or baseline comfort level:   Encourage patient to monitor pain and request assistance   Assess pain using appropriate pain scale   Administer analgesics based on type and severity of pain and evaluate response     Problem: Neurosensory - Adult  Goal: Achieves stable or improved neurological status  12/15/2024 0145 by Devorah Escoto RN  Outcome: Progressing  Flowsheets (Taken 12/15/2024 0145)  Achieves stable or improved neurological status:   Assess for and report changes in neurological status   Initiate measures to prevent increased intracranial pressure     Problem: Skin/Tissue Integrity - Adult  Goal: Skin integrity remains intact  12/15/2024 0145 by Devorah Escoto RN  Outcome: Progressing  Flowsheets (Taken 12/15/2024 0145)  Skin Integrity Remains Intact: Monitor for areas of redness and/or skin breakdown     Problem: Metabolic/Fluid and Electrolytes - Adult  Goal: Electrolytes maintained within normal limits  12/15/2024 0145 by Devorah Escoto RN  Outcome: Progressing  Flowsheets (Taken 12/15/2024 0145)  Electrolytes maintained within normal limits:   Monitor labs and assess patient for signs and symptoms of electrolyte imbalances   Administer electrolyte replacement as ordered   Monitor response to electrolyte replacements, including repeat lab results as appropriate     Problem: Metabolic/Fluid and Electrolytes - Adult  Goal: Glucose maintained within prescribed range  12/15/2024 0145 by Devorah Escoto, RN  Outcome: Progressing  Flowsheets (Taken 12/15/2024 0145)  Glucose maintained within prescribed range:   Monitor  blood glucose as ordered   Assess for signs and symptoms of hyperglycemia and hypoglycemia   Administer ordered medications to maintain glucose within target range     Problem: Change in Body Image  Goal: Pt/Family communicate acceptance of loss or change in body image and feel psychological comfort and peace  12/15/2024 0145 by Devorah Escoto, RN  Outcome: Progressing  Flowsheets (Taken 12/15/2024 0145)  Patient/family communicate acceptance of loss or change in body image and feel psychological comfort and peace:   Provide emotional and spiritual support   Assess patient/family anxiety and grief process related to change in body image, loss of functional status, loss of sense of self, and forgiveness   Provide information about the patient’s health status with consideration of family and cultural values     Problem: Confusion  Goal: Confusion, delirium, dementia, or psychosis is improved or at baseline  12/15/2024 0145 by Devorah Escoto, RN  Outcome: Progressing  Flowsheets (Taken 12/15/2024 0145)  Effect of thought disturbance (confusion, delirium, dementia, or psychosis) are managed with adequate functional status:   Assess for contributors to thought disturbance, including medications, impaired vision or hearing, underlying metabolic abnormalities, dehydration, psychiatric diagnoses, notify LIP   [place] high risk fall precautions, as indicated   Provide frequent short contacts to provide reality reorientation, refocusing and direction   Decrease environmental stimuli, including noise as appropriate

## 2024-12-15 NOTE — FLOWSHEET NOTE
12/15/24 1730   Treatment Team Notification   Reason for Communication   (PATIENT CONTINUES TO C/O HEADACHE 10/10. STATES NO IMPROVEMENT WITH PRN OXYCODONE AND TYLENOL.)   Name of Team Member Notified DR. SARA VIERA   Treatment Team Role Attending Provider   Method of Communication Secure Message   Response Waiting for response   Notification Time 4925     12/15/2024 @ 1750: Dr. Viera ordered patient's home Fioricet. See MAR. Electronically signed by Marifer White RN on 12/15/2024 at 5:50 PM

## 2024-12-16 VITALS
SYSTOLIC BLOOD PRESSURE: 149 MMHG | OXYGEN SATURATION: 97 % | HEIGHT: 60 IN | DIASTOLIC BLOOD PRESSURE: 60 MMHG | HEART RATE: 76 BPM | WEIGHT: 111.11 LBS | BODY MASS INDEX: 21.81 KG/M2 | RESPIRATION RATE: 18 BRPM | TEMPERATURE: 98.1 F

## 2024-12-16 LAB
ALBUMIN SERPL-MCNC: 3.6 G/DL (ref 3.4–5)
ANION GAP SERPL CALCULATED.3IONS-SCNC: 15 MMOL/L (ref 3–16)
ANION GAP SERPL CALCULATED.3IONS-SCNC: 18 MMOL/L (ref 3–16)
BACTERIA BLD CULT ORG #2: NORMAL
BACTERIA BLD CULT: NORMAL
BASOPHILS # BLD: 0 K/UL (ref 0–0.2)
BASOPHILS NFR BLD: 0.3 %
BUN SERPL-MCNC: 26 MG/DL (ref 7–20)
BUN SERPL-MCNC: 26 MG/DL (ref 7–20)
CALCIUM SERPL-MCNC: 9 MG/DL (ref 8.3–10.6)
CALCIUM SERPL-MCNC: 9.1 MG/DL (ref 8.3–10.6)
CHLORIDE SERPL-SCNC: 94 MMOL/L (ref 99–110)
CHLORIDE SERPL-SCNC: 95 MMOL/L (ref 99–110)
CO2 SERPL-SCNC: 17 MMOL/L (ref 21–32)
CO2 SERPL-SCNC: 19 MMOL/L (ref 21–32)
CREAT SERPL-MCNC: 1.9 MG/DL (ref 0.6–1.2)
CREAT SERPL-MCNC: 1.9 MG/DL (ref 0.6–1.2)
DEPRECATED RDW RBC AUTO: 15 % (ref 12.4–15.4)
EOSINOPHIL # BLD: 0 K/UL (ref 0–0.6)
EOSINOPHIL NFR BLD: 0.1 %
GFR SERPLBLD CREATININE-BSD FMLA CKD-EPI: 28 ML/MIN/{1.73_M2}
GFR SERPLBLD CREATININE-BSD FMLA CKD-EPI: 28 ML/MIN/{1.73_M2}
GLUCOSE BLD-MCNC: 105 MG/DL (ref 70–99)
GLUCOSE BLD-MCNC: 134 MG/DL (ref 70–99)
GLUCOSE BLD-MCNC: 175 MG/DL (ref 70–99)
GLUCOSE SERPL-MCNC: 129 MG/DL (ref 70–99)
GLUCOSE SERPL-MCNC: 129 MG/DL (ref 70–99)
HCT VFR BLD AUTO: 28.2 % (ref 36–48)
HGB BLD-MCNC: 9.1 G/DL (ref 12–16)
LYMPHOCYTES # BLD: 0.5 K/UL (ref 1–5.1)
LYMPHOCYTES NFR BLD: 6.7 %
MAGNESIUM SERPL-MCNC: 1.75 MG/DL (ref 1.8–2.4)
MCH RBC QN AUTO: 30.8 PG (ref 26–34)
MCHC RBC AUTO-ENTMCNC: 32.4 G/DL (ref 31–36)
MCV RBC AUTO: 95 FL (ref 80–100)
MONOCYTES # BLD: 0.4 K/UL (ref 0–1.3)
MONOCYTES NFR BLD: 4.9 %
NEUTROPHILS # BLD: 6.3 K/UL (ref 1.7–7.7)
NEUTROPHILS NFR BLD: 88 %
PERFORMED ON: ABNORMAL
PHOSPHATE SERPL-MCNC: 2.5 MG/DL (ref 2.5–4.9)
PLATELET # BLD AUTO: 189 K/UL (ref 135–450)
PMV BLD AUTO: 9 FL (ref 5–10.5)
POTASSIUM SERPL-SCNC: 3.5 MMOL/L (ref 3.5–5.1)
POTASSIUM SERPL-SCNC: 3.5 MMOL/L (ref 3.5–5.1)
RBC # BLD AUTO: 2.97 M/UL (ref 4–5.2)
SODIUM SERPL-SCNC: 128 MMOL/L (ref 136–145)
SODIUM SERPL-SCNC: 130 MMOL/L (ref 136–145)
WBC # BLD AUTO: 7.2 K/UL (ref 4–11)

## 2024-12-16 PROCEDURE — 80069 RENAL FUNCTION PANEL: CPT

## 2024-12-16 PROCEDURE — 94760 N-INVAS EAR/PLS OXIMETRY 1: CPT

## 2024-12-16 PROCEDURE — 2580000003 HC RX 258: Performed by: HOSPITALIST

## 2024-12-16 PROCEDURE — 85025 COMPLETE CBC W/AUTO DIFF WBC: CPT

## 2024-12-16 PROCEDURE — 6370000000 HC RX 637 (ALT 250 FOR IP): Performed by: STUDENT IN AN ORGANIZED HEALTH CARE EDUCATION/TRAINING PROGRAM

## 2024-12-16 PROCEDURE — 36415 COLL VENOUS BLD VENIPUNCTURE: CPT

## 2024-12-16 PROCEDURE — 6370000000 HC RX 637 (ALT 250 FOR IP): Performed by: HOSPITALIST

## 2024-12-16 PROCEDURE — 6370000000 HC RX 637 (ALT 250 FOR IP): Performed by: INTERNAL MEDICINE

## 2024-12-16 PROCEDURE — 6360000002 HC RX W HCPCS: Performed by: HOSPITALIST

## 2024-12-16 PROCEDURE — 94640 AIRWAY INHALATION TREATMENT: CPT

## 2024-12-16 PROCEDURE — 83735 ASSAY OF MAGNESIUM: CPT

## 2024-12-16 PROCEDURE — 2580000003 HC RX 258: Performed by: REGISTERED NURSE

## 2024-12-16 PROCEDURE — 6360000002 HC RX W HCPCS: Performed by: REGISTERED NURSE

## 2024-12-16 RX ORDER — AMLODIPINE BESYLATE 10 MG/1
10 TABLET ORAL DAILY
Qty: 30 TABLET | Refills: 0 | Status: SHIPPED | OUTPATIENT
Start: 2024-12-17

## 2024-12-16 RX ORDER — METOPROLOL SUCCINATE 50 MG/1
50 TABLET, EXTENDED RELEASE ORAL 2 TIMES DAILY
Qty: 30 TABLET | Refills: 3 | Status: SHIPPED | OUTPATIENT
Start: 2024-12-16

## 2024-12-16 RX ORDER — INSULIN GLARGINE 100 [IU]/ML
5 INJECTION, SOLUTION SUBCUTANEOUS NIGHTLY
Qty: 10 ML | Refills: 3 | Status: SHIPPED | OUTPATIENT
Start: 2024-12-16

## 2024-12-16 RX ORDER — HYDRALAZINE HYDROCHLORIDE 100 MG/1
100 TABLET, FILM COATED ORAL 3 TIMES DAILY
Qty: 90 TABLET | Refills: 0 | Status: SHIPPED | OUTPATIENT
Start: 2024-12-16

## 2024-12-16 RX ADMIN — AMLODIPINE BESYLATE 10 MG: 10 TABLET ORAL at 09:16

## 2024-12-16 RX ADMIN — Medication 2 PUFF: at 09:01

## 2024-12-16 RX ADMIN — MONTELUKAST 10 MG: 10 TABLET, FILM COATED ORAL at 09:15

## 2024-12-16 RX ADMIN — PREDNISONE 10 MG: 10 TABLET ORAL at 09:16

## 2024-12-16 RX ADMIN — HYDRALAZINE HYDROCHLORIDE 100 MG: 50 TABLET ORAL at 09:15

## 2024-12-16 RX ADMIN — SODIUM CHLORIDE, PRESERVATIVE FREE 10 ML: 5 INJECTION INTRAVENOUS at 09:16

## 2024-12-16 RX ADMIN — RANOLAZINE 1000 MG: 500 TABLET, FILM COATED, EXTENDED RELEASE ORAL at 09:15

## 2024-12-16 RX ADMIN — ASPIRIN 81 MG: 81 TABLET, CHEWABLE ORAL at 09:16

## 2024-12-16 RX ADMIN — TICAGRELOR 90 MG: 90 TABLET ORAL at 09:15

## 2024-12-16 RX ADMIN — METOPROLOL SUCCINATE 50 MG: 50 TABLET, EXTENDED RELEASE ORAL at 09:15

## 2024-12-16 RX ADMIN — LEVETIRACETAM 500 MG: 500 TABLET, FILM COATED ORAL at 09:16

## 2024-12-16 RX ADMIN — DULOXETINE HYDROCHLORIDE 60 MG: 60 CAPSULE, DELAYED RELEASE ORAL at 09:16

## 2024-12-16 RX ADMIN — FERROUS SULFATE TAB 325 MG (65 MG ELEMENTAL FE) 325 MG: 325 (65 FE) TAB at 09:15

## 2024-12-16 RX ADMIN — CALCIUM CARBONATE-VITAMIN D TAB 500 MG-200 UNIT 1 TABLET: 500-200 TAB at 09:16

## 2024-12-16 RX ADMIN — PANTOPRAZOLE SODIUM 40 MG: 40 TABLET, DELAYED RELEASE ORAL at 06:15

## 2024-12-16 RX ADMIN — WATER 1000 MG: 1 INJECTION INTRAMUSCULAR; INTRAVENOUS; SUBCUTANEOUS at 00:50

## 2024-12-16 RX ADMIN — ISOSORBIDE MONONITRATE 60 MG: 60 TABLET ORAL at 09:15

## 2024-12-16 RX ADMIN — HEPARIN SODIUM 5000 UNITS: 5000 INJECTION INTRAVENOUS; SUBCUTANEOUS at 09:15

## 2024-12-16 RX ADMIN — FLUTICASONE PROPIONATE 1 SPRAY: 50 SPRAY, METERED NASAL at 09:15

## 2024-12-16 NOTE — CARE COORDINATION
DISCHARGE PLANNING:     DC plan to return home alone with family support.  Family will transport.  Active with [facility] for SN/PT/OT.  Active with [facility] for [facility] 5d/wk for 5hr/d, MOWs. Creatinine improving, watching nephrology recs. Cardiology signed off at this time.    #[phone]  Electronically signed by Trixie Baires RN on 12/16/2024 at 2:19 PM

## 2024-12-16 NOTE — PROGRESS NOTES
Department of Internal Medicine  Nephrology Progress Note        Reason for consultation: KOLBY      History of Present Illness: Maren Meza is a 70 yo female with a PMHx of CKD 3b/4, h/o KOLBY, HTN, afib s/p watchman, CAD, CHF, COPD, DM2, HLD. Patient presents to  ED on 12/7/2024 with complaints of chest pain and SOB. Troponins 54>46. CTA Chest abdomen is negative for acute findings. Imaging does reveal moderate to severe R renal artery disease. Patient is supposed to have an appt with vascular surgery tomorrow. Cardiology saw pt this admission and is going discuss with Dr. Allred.           Events noted . Labs p   REVIEW OF SYSTEMS:   no acute complaints     No family present     Physical Exam:    VITALS:  BP (!) 166/63   Pulse 70   Temp 98.1 °F (36.7 °C) (Oral)   Resp 18   Ht 1.524 m (5')   Wt 50.4 kg (111 lb 1.8 oz)   SpO2 97%   BMI 21.70 kg/m²   24HR INTAKE/OUTPUT:    Intake/Output Summary (Last 24 hours) at 12/16/2024 1202  Last data filed at 12/16/2024 0830  Gross per 24 hour   Intake 1396.71 ml   Output --   Net 1396.71 ml       Constitutional: Looks comfortable   Respiratory:  CTA  Gastrointestinal:  No  tenderness.  Normal Bowel Sounds  Cardiovascular:  S1, S2 Irregular   Edema:   +  edema    DATA:    CBC:  Lab Results   Component Value Date/Time    WBC 8.5 12/15/2024 05:31 AM    RBC 2.99 12/15/2024 05:31 AM    HGB 9.3 12/15/2024 05:31 AM    HCT 28.4 12/15/2024 05:31 AM    MCV 94.9 12/15/2024 05:31 AM    MCH 31.2 12/15/2024 05:31 AM    MCHC 32.8 12/15/2024 05:31 AM    RDW 14.8 12/15/2024 05:31 AM     12/15/2024 05:31 AM    MPV 9.8 12/15/2024 05:31 AM     CMP:  Lab Results   Component Value Date/Time     12/15/2024 05:31 AM    K 3.9 12/15/2024 05:31 AM    K 3.8 12/07/2024 08:25 PM    CL 98 12/15/2024 05:31 AM    CO2 22 12/15/2024 05:31 AM    BUN 30 12/15/2024 05:31 AM    CREATININE 1.8 12/15/2024 05:31 AM    GFRAA 46 10/09/2022 06:18 AM    GFRAA 60 05/23/2013 02:56 PM    AGRATIO 1.1  12/07/2024 08:25 PM    LABGLOM 30 12/15/2024 05:31 AM    LABGLOM 49 04/22/2024 04:43 AM    GLUCOSE 63 12/15/2024 05:31 AM    CALCIUM 9.7 12/15/2024 05:31 AM    BILITOT <0.2 12/08/2024 06:15 AM    ALKPHOS 144 12/08/2024 06:15 AM    AST 40 12/08/2024 06:15 AM    ALT 27 12/08/2024 06:15 AM      Hepatic Function Panel:   Lab Results   Component Value Date/Time    ALKPHOS 144 12/08/2024 06:15 AM    ALT 27 12/08/2024 06:15 AM    AST 40 12/08/2024 06:15 AM    BILITOT <0.2 12/08/2024 06:15 AM    BILIDIR <0.1 12/08/2024 06:15 AM    IBILI see below 12/08/2024 06:15 AM      Phosphorus:   Lab Results   Component Value Date/Time    PHOS 2.4 12/15/2024 05:31 AM       ASSESSMENT:  Principal Problem (Resolved):    Acute on chronic systolic right heart failure (HCC)  Active Problems:    Paroxysmal A-fib (HCC)    Chest pain    Elevated troponin I level    Acute on chronic diastolic heart failure (HCC)    Type 2 diabetes mellitus with diabetic chronic kidney disease (HCC)    Hypertensive heart and chronic kidney disease with heart failure and stage 1 through stage 4 chronic kidney disease, or unspecified chronic kidney disease (HCC)    CKD stage 3a, GFR 45-59 ml/min (HCC)    Type 2 diabetes mellitus with mild nonproliferative diabetic retinopathy without macular edema, bilateral (HCC)    Hypertensive emergency      PLAN:  1- 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 Cr baseline ~ 1.5 mg/dL prior to this.   Supposed to follow with Dr. Chung in office (last seen 11/2023). Etiology of KOLBY likely 2/2 contrast induced nephropathy in the setting of losartan use.              - labs p   , off   IVF.               - s/p gill removal . Monitor I/O               - Off  losartan              - Avoid nephrotoxic agents                   2- Non anion gap metabolic acidosis: 2/2 KOLBY/CKD              - resolved .      3- Chest pain              - Management per cardiology     4- R renal artery stenosis  Hypertension

## 2024-12-16 NOTE — PROGRESS NOTES
Name:  Maren Meza /Age/Sex: 1955  (69 y.o. female)   MRN & CSN:  [id number] & [id number] Encounter Date/Time: 2024 1:27 PM EST   Location:  [id number] PCP: Marin Cardenas MD     Attending:Zhou Woodard MD       Maren Meza is a 69 y.o. female with  has a past medical history of Acid reflux, Anemia, Anxiety and depression, Arthritis, Asthma, Atrial fibrillation (HCC), CAD (coronary artery disease), Cerebral artery occlusion with cerebral infarction (HCC), CHF (congestive heart failure) (HCC), Chronic kidney disease--stage III, COPD (chronic obstructive pulmonary disease) (HCC), DM2 (diabetes mellitus, type 2) (HCC), Dysarthria, Fibromyalgia, Headache(784.0), Hemisensory loss, History of blood transfusion, Hyperlipidemia, Hypertension, IBS (irritable bowel syndrome), Inferior vena cava occlusion (HCC), Keratitis, Meningioma (HCC), MI, old, Neuropathy, Superior vena cava obstruction, Temporal arteritis (HCC), and Wears glasses. who presents with Acute on chronic systolic right heart failure (HCC)    Hospital Day: 10      Interval history:     Seen and examined at bedside. No acute events overnight. Reports feeling better, pain well controlled    Assessment and Plan     Essential hypertension  Renal artery stenosis  -Renal duplex nondiagnostic secondary to patient movement  -Hydralazine 100 mg 3 times daily  -Metoprolol 50 mg twice daily  -Increase amlodipine to 10 mg daily  -Outpatient follow-up with Dr. Allred     Atypical chest pain  -Chronic, recurrent problem  -Imdur and Ranexa  -Evaluated by Cardiology, no ischemic evaluation recommended     Urinary tract infection  -Urine culture with no growth  -Continue IV Rocephin 1 g daily while inpatient     Chronic diastolic heart failure without acute exacerbation  -Echo on  showed EF 55 to 60% with grade 2 diastolic dysfunction     KOLBY on chronic kidney disease stage IV  -Suspected contrast-induced nephropathy, creatinine continues  with breakfast    DULoxetine  60 mg Oral Daily    ferrous sulfate  325 mg Oral Daily with breakfast    fluticasone  1 spray Each Nostril Daily    budesonide-formoterol  2 puff Inhalation BID RT    isosorbide mononitrate  60 mg Oral BID    levETIRAcetam  500 mg Oral BID    linaclotide  290 mcg Oral Once per day on Monday Thursday    metoprolol succinate  50 mg Oral BID    montelukast  10 mg Oral Daily    pantoprazole  40 mg Oral QAM AC    predniSONE  10 mg Oral Daily    QUEtiapine  100 mg Oral Nightly    ranolazine  1,000 mg Oral BID    ticagrelor  90 mg Oral BID      Infusions:    sodium chloride      dextrose      dextrose       PRN Meds: hydrALAZINE, 10 mg, Q6H PRN  promethazine, 12.5 mg, Q6H PRN  butalbital-acetaminophen-caffeine, 1 tablet, Q4H PRN  oxyCODONE, 5 mg, Q6H PRN  sennosides-docusate sodium, 2 tablet, BID PRN  sodium chloride flush, 5-40 mL, PRN  sodium chloride, , PRN  ondansetron, 4 mg, Q8H PRN   Or  ondansetron, 4 mg, Q6H PRN  polyethylene glycol, 17 g, Daily PRN  acetaminophen, 650 mg, Q6H PRN  perflutren lipid microspheres, 1.5 mL, ONCE PRN  glucose, 4 tablet, PRN  dextrose bolus, 125 mL, PRN   Or  dextrose bolus, 250 mL, PRN  glucagon (rDNA), 1 mg, PRN  dextrose, , Continuous PRN  dextrose, , Continuous PRN        Labs and Imaging     Recent Labs     12/14/24  1301 12/15/24  0531   WBC 6.8 8.5   HGB 9.0* 9.3*    143   MCV 94.7 94.9    136   K 3.4* 3.9   CL 98* 98*   CO2 24 22   BUN 32* 30*   CREATININE 2.0* 1.8*   GLUCOSE 121* 63*   PHOS 2.5 2.4*   CALCIUM 9.1 9.7   ALBUMIN 3.5 3.6       No results found.        Uncontrolled hypertension  placing patient at risk of multiple comorbidities including threat to bodily function    Drugs that require monitoring for toxicity include heparin and the method of monitoring was H&H for anemia    Discussed management with RN and discharge planning options with Case Management   EKG rhythm strip as interpreted by me showing NSR  Personally  reviewed lab test results  Personally reviewed studies   Reviewed prior external records in detail  Reviewed nursing notes overnight    Zhou Woodard MD

## 2024-12-16 NOTE — PLAN OF CARE
Problem: Chronic Conditions and Co-morbidities  Goal: Patient's chronic conditions and co-morbidity symptoms are monitored and maintained or improved  12/16/2024 0149 by Devorah Escoto, RN  Outcome: Progressing  Flowsheets (Taken 12/16/2024 0149)  Care Plan - Patient's Chronic Conditions and Co-Morbidity Symptoms are Monitored and Maintained or Improved:   Monitor and assess patient's chronic conditions and comorbid symptoms for stability, deterioration, or improvement   Collaborate with multidisciplinary team to address chronic and comorbid conditions and prevent exacerbation or deterioration     Problem: Discharge Planning  Goal: Discharge to home or other facility with appropriate resources  12/16/2024 0149 by Devorah Escoto, RN  Outcome: Progressing  Flowsheets (Taken 12/16/2024 0149)  Discharge to home or other facility with appropriate resources:   Identify barriers to discharge with patient and caregiver   Arrange for needed discharge resources and transportation as appropriate   Identify discharge learning needs (meds, wound care, etc)     Problem: Safety - Adult  Goal: Free from fall injury  12/16/2024 0149 by Devorah Escoto, RN  Outcome: Progressing  Flowsheets (Taken 12/16/2024 0149)  Free From Fall Injury: Instruct family/caregiver on patient safety     Problem: Cardiovascular - Adult  Goal: Maintains optimal cardiac output and hemodynamic stability  12/16/2024 0149 by Devorah Escoto, RN  Outcome: Progressing  Flowsheets (Taken 12/16/2024 0149)  Maintains optimal cardiac output and hemodynamic stability:   Monitor blood pressure and heart rate   Monitor urine output and notify Licensed Independent Practitioner for values outside of normal range   Assess for signs of decreased cardiac output   Administer fluid and/or volume expanders as ordered     Problem: Cardiovascular - Adult  Goal: Absence of cardiac dysrhythmias or at baseline  12/16/2024 0149 by Devorah Escoto,  Integrity Remains Intact: Monitor for areas of redness and/or skin breakdown     Problem: Genitourinary - Adult  Goal: Absence of urinary retention  12/16/2024 0149 by Devorah Escoto RN  Outcome: Progressing  Flowsheets (Taken 12/16/2024 0149)  Absence of urinary retention:   Assess patient’s ability to void and empty bladder   Monitor intake/output and perform bladder scan as needed     Problem: Infection - Adult  Goal: Absence of infection at discharge  12/16/2024 0149 by Devorah Escoto RN  Outcome: Progressing  Flowsheets (Taken 12/16/2024 0149)  Absence of infection at discharge:   Assess and monitor for signs and symptoms of infection   Monitor lab/diagnostic results   Administer medications as ordered     Problem: Metabolic/Fluid and Electrolytes - Adult  Goal: Hemodynamic stability and optimal renal function maintained  12/16/2024 0149 by Devorah Escoto RN  Outcome: Progressing  Flowsheets (Taken 12/16/2024 0149)  Hemodynamic stability and optimal renal function maintained:   Monitor labs and assess for signs and symptoms of volume excess or deficit   Monitor intake, output and patient weight     Problem: Metabolic/Fluid and Electrolytes - Adult  Goal: Glucose maintained within prescribed range  12/16/2024 0149 by Devorah Escoto RN  Outcome: Progressing  Flowsheets (Taken 12/16/2024 0149)  Glucose maintained within prescribed range:   Monitor blood glucose as ordered   Assess for signs and symptoms of hyperglycemia and hypoglycemia     Problem: Anxiety  Goal: Will report anxiety at manageable levels  12/16/2024 0149 by Devorah Escoto RN  Outcome: Progressing  Flowsheets (Taken 12/16/2024 0149)  Will report anxiety at manageable levels: Administer medication as ordered

## 2024-12-16 NOTE — PROGRESS NOTES
Discharge instructions reviewed with pt/family, discussed medications, VU, denies any further questions or anxiety related to discharge. Educational handouts in regards to new medications, given via [employer], side effects discussed, VU. IV/tele discontinued. Pt discharged to home. Taken from room via wheelchair by RN, personal belongings taken with.     Follow up appointment made with Dr. Cardenas for patient.    New medications supplied through outpatient pharmacy.

## 2024-12-16 NOTE — PLAN OF CARE
Problem: Chronic Conditions and Co-morbidities  Goal: Patient's chronic conditions and co-morbidity symptoms are monitored and maintained or improved  12/16/2024 1216 by Kali Sanchez RN  Outcome: Progressing     Problem: Discharge Planning  Goal: Discharge to home or other facility with appropriate resources  12/16/2024 1216 by Kali Sanchez RN  Outcome: Progressing     Problem: Safety - Adult  Goal: Free from fall injury  12/16/2024 1216 by Kali Sanchez RN  Outcome: Progressing     Problem: Cardiovascular - Adult  Goal: Maintains optimal cardiac output and hemodynamic stability  12/16/2024 1216 by Kali Sanchez RN  Outcome: Progressing     Problem: Cardiovascular - Adult  Goal: Absence of cardiac dysrhythmias or at baseline  12/16/2024 1216 by Kali Sanchez RN  Outcome: Progressing     Problem: Neurosensory - Adult  Goal: Achieves stable or improved neurological status  12/16/2024 1216 by Kali Sanchez RN  Outcome: Progressing     Problem: Respiratory - Adult  Goal: Achieves optimal ventilation and oxygenation  12/16/2024 1216 by Kali Sanchez RN  Outcome: Progressing     Problem: Skin/Tissue Integrity - Adult  Goal: Skin integrity remains intact  12/16/2024 1216 by Kali Sanchez RN  Outcome: Progressing     Problem: Genitourinary - Adult  Goal: Absence of urinary retention  12/16/2024 1216 by Kali Sanchez RN  Outcome: Progressing     Problem: Infection - Adult  Goal: Absence of infection at discharge  12/16/2024 1216 by Kali Sanchez RN  Outcome: Progressing     Problem: Metabolic/Fluid and Electrolytes - Adult  Goal: Electrolytes maintained within normal limits  12/16/2024 1216 by Kali Sanchez RN  Outcome: Progressing     Problem: Metabolic/Fluid and Electrolytes - Adult  Goal: Hemodynamic stability and optimal renal function maintained  12/16/2024 1216 by Kali Sanchez RN  Outcome: Progressing     Problem: Metabolic/Fluid and Electrolytes - Adult  Goal: Glucose maintained within  and facilitate grief process with patient/family as appropriate  5. Emphasize sustaining relationships within family system and community, or luis/spiritual traditions  6. Initiate Spiritual Care, Psychosocial Clinical Specialist consult as needed  Outcome: Progressing     Problem: Decision Making  Goal: Pt/Family able to effectively weigh alternatives and participate in decision making related to treatment and care  Description: INTERVENTIONS:  1. Determine when there are differences between patient's view, family's view, and healthcare provider's view of condition  2. Facilitate patient and family articulation of goals for care  3. Help patient and family identify pros/cons of alternative solutions  4. Provide information as requested by patient/family  5. Respect patient/family right to receive or not to receive information  6. Serve as a liaison between patient and family and health care team  7. Initiate Consults from Ethics, Palliative Care or initiate Family Care Conference as is appropriate  Outcome: Progressing     Problem: Confusion  Goal: Confusion, delirium, dementia, or psychosis is improved or at baseline  Description: INTERVENTIONS:  1. Assess for possible contributors to thought disturbance, including medications, impaired vision or hearing, underlying metabolic abnormalities, dehydration, psychiatric diagnoses, and notify attending LIP  2. [place] high risk fall precautions, as indicated  3. Provide frequent short contacts to provide reality reorientation, refocusing and direction  4. Decrease environmental stimuli, including noise as appropriate  5. Monitor and intervene to maintain adequate nutrition, hydration, elimination, sleep and activity  6. If unable to ensure safety without constant attention obtain sitter and review sitter guidelines with assigned personnel  7. Initiate Psychosocial CNS and Spiritual Care consult, as indicated  Outcome: Progressing

## 2024-12-16 NOTE — CARE COORDINATION
Case Management Discharge Note          Date / Time of Note: 12/16/2024 3:06 PM                  Patient Name: Maren Meza   YOB: 1955  Diagnosis: Elevated troponin [R79.89]  Acute on chronic systolic congestive heart failure (HCC) [I50.23]  Goals of care, counseling/discussion [Z71.89]  Acute on chronic systolic right heart failure (HCC) [I50.813]  Chest pain, unspecified type [R07.9]  Chronic kidney disease, unspecified CKD stage [N18.9]   Date / Time: 12/7/2024  7:47 PM    Financial:  Payor: CONSUELO RADFORD [place] / Plan: CORDEROHAILEY RADFORD [place] DUAL / Product Type: *No Product type* /      Pharmacy:    [facility] [address] [phone] - F [phone]  [address] [address]  Phone: [phone] Fax: [phone]    [facility] [address] [phone] - F [phone]  [address] [facility] [facility] [facility] [address]  Phone: [phone] Fax: [phone]    [id number] [place], [place] - [address] [phone] - F [phone]  [address] [address]  Phone: [phone] Fax: [phone]      Assistance purchasing medications?: Potential Assistance Purchasing Medications: No  Assistance provided by Case Management: None at this time    DISCHARGE Disposition: Home with Home Health Care    Home Care:  Home Care ordered at discharge: Yes  Home Care Agency: [facility]  Phone: [phone]  Fax: [phone]  Orders faxed: can pull from [facility]    Transportation:  Transportation PLAN for discharge: family   Mode of Transport: Private Car    IMM Completed:   Yes, Case management has presented and reviewed IMM letter #2.       IMM Letter date given:: 12/16/24  IMM Letter time given:: 5214.   Patient and/or family/POA verbalized understanding of their medicare rights and appeal process if needed. Patient and/or family/POA has signed, initialed and  placed the date and time on IMM letter #2 on the the appropriate lines. Copy of letter offered and they are aware that the original copy of IMM letter #2 is available prior to discharge from the paper chart on the unit.  Electronic documentation has been entered into epic for IMM letter #2 and original paper copy has been added to the paper chart at the nurses station.     Additional CM Notes:   DC order noted.      The Plan for Transition of Care is related to the following treatment goals of Elevated troponin [R79.89]  Acute on chronic systolic congestive heart failure (HCC) [I50.23]  Goals of care, counseling/discussion [Z71.89]  Acute on chronic systolic right heart failure (HCC) [I50.813]  Chest pain, unspecified type [R07.9]  Chronic kidney disease, unspecified CKD stage [N18.9]    The Patient and/or patient representative Maren and her family were provided with a choice of provider and agrees with the discharge plan Yes    Freedom of choice list was provided with basic dialogue that supports the patient's individualized plan of care/goals and shares the quality data associated with the providers. Yes    Trixie Baires RN  [facility]   Case Management Department  Ph: [phone]

## 2024-12-16 NOTE — PROGRESS NOTES
Comprehensive Nutrition Assessment    Type and Reason for Visit:  Initial, LOS    Nutrition Recommendations/Plan:   Continue regular diet; 4 carb choices (60 gm/meal); No added salt (3-4 gm); 1500 mL  Continue to monitor patients po intake for any needed changes     Malnutrition Assessment:  Malnutrition Status:  No malnutrition (12/16/24 1342)    Context:  Acute Illness     Findings of the 6 clinical characteristics of malnutrition:  Energy Intake:  Mild decrease in energy intake  Weight Loss:  No weight loss     Body Fat Loss:  No body fat loss     Muscle Mass Loss:  No muscle mass loss    Fluid Accumulation:  No fluid accumulation     Strength:  Not Performed    Nutrition Assessment:    Per progress notes patient presented to the ED with complaints of chest pain. Patient has a pmh of multiple heart attacks and stents. Per EHR patient was also having SOB and diaphoresis. Pmh of CKD stage 4, heart failure, IBS, and T2DM. During assessment patient reports feeling better with no pain. Prior to admission patient reports appetite has been down for the past 2-3 months due to stress of losing her son. Patient receives meals on wheels at home and boost drinks every month. Since admitted patient states she has not been eating much of her meals due to not like the food served at the hospital. States she will eat much better when she goes home. Usual body weight was 115lbs last week. Travis notices that she has lost some weight based off her clothes feeling lose. Per weight history patients weight appears to flucutate around 108-117lbs in 2024. Patient is currently on a regular diet; 4 carb choice (60 gm/meal); No added salt (3-4 gm); 1500 ml. Patient reports she maybe discharging today. Will continue to monitor patients po intake for any needed changes.    Nutrition Related Findings:    Labs reported (POC Glucose 105 H). Oriented x 4. Last BM on 12/12/2024. No edema Wound Type: None       Current Nutrition Intake &

## 2024-12-16 NOTE — PROGRESS NOTES
[facility]  Glycemic Control      NAME: Maren Meza  MEDICAL RECORD NUMBER:  [id number]  AGE: 69 y.o.   GENDER: female  : 1955  EPISODE DATE:  2024     Data     Recent Labs     12/15/24  1210 12/15/24  1730 12/15/24  1947 24  0247 24  0811 24  1209   POCGLU 186* 170* 204* 175* 105* 134*       HgBA1c:    Lab Results   Component Value Date/Time    LABA1C 10.0 2024 05:39 AM       BUN/Creatinine:    Lab Results   Component Value Date/Time    BUN 30 12/15/2024 05:31 AM    CREATININE 1.8 12/15/2024 05:31 AM     GFR Estimated Creatinine Clearance: 21 mL/min (A) (based on SCr of 1.8 mg/dL (H)).    FIB-4 Calculation: 3.66 at 12/15/2024  5:31 AM  Calculated from:  SGOT/AST: 40 U/L at 2024  6:15 AM  SGPT/ALT: 27 U/L at 2024  6:15 AM  Platelets: 143 K/uL at 12/15/2024  5:31 AM  Age: 69 years    Medications  Scheduled Medications:   amLODIPine  10 mg Oral Daily    insulin glargine  5 Units SubCUTAneous Nightly    sodium chloride  250 mL IntraVENous Once    insulin lispro  0-4 Units SubCUTAneous 4x Daily AC & HS    heparin (porcine)  5,000 Units SubCUTAneous BID    hydrALAZINE  100 mg Oral TID    sodium chloride flush  5-40 mL IntraVENous 2 times per day    aspirin  81 mg Oral Daily    atorvastatin  80 mg Oral Nightly    amitriptyline  40 mg Oral Nightly    oyster shell calcium w/D  1 tablet Oral Daily with breakfast    DULoxetine  60 mg Oral Daily    ferrous sulfate  325 mg Oral Daily with breakfast    fluticasone  1 spray Each Nostril Daily    budesonide-formoterol  2 puff Inhalation BID RT    isosorbide mononitrate  60 mg Oral BID    levETIRAcetam  500 mg Oral BID    linaclotide  290 mcg Oral Once per day on     metoprolol succinate  50 mg Oral BID    montelukast  10 mg Oral Daily    pantoprazole  40 mg Oral QAM AC    predniSONE  10 mg Oral Daily    QUEtiapine  100 mg Oral Nightly    ranolazine  1,000 mg Oral BID    ticagrelor  90 mg Oral BID

## 2024-12-17 ENCOUNTER — OFFICE VISIT (OUTPATIENT)
Dept: PRIMARY CARE CLINIC | Age: 69
End: 2024-12-17
Payer: COMMERCIAL

## 2024-12-17 ENCOUNTER — TELEPHONE (OUTPATIENT)
Dept: PRIMARY CARE CLINIC | Age: 69
End: 2024-12-17

## 2024-12-17 VITALS
SYSTOLIC BLOOD PRESSURE: 140 MMHG | DIASTOLIC BLOOD PRESSURE: 88 MMHG | HEIGHT: 60 IN | HEART RATE: 77 BPM | OXYGEN SATURATION: 100 % | BODY MASS INDEX: 22.07 KG/M2 | TEMPERATURE: 98.4 F | WEIGHT: 112.4 LBS

## 2024-12-17 DIAGNOSIS — Z91.81 AT HIGH RISK FOR FALLS: ICD-10-CM

## 2024-12-17 DIAGNOSIS — Z09 HOSPITAL DISCHARGE FOLLOW-UP: Primary | ICD-10-CM

## 2024-12-17 DIAGNOSIS — F99 MENTAL HEALTH DISORDER: ICD-10-CM

## 2024-12-17 PROCEDURE — 1111F DSCHRG MED/CURRENT MED MERGE: CPT | Performed by: INTERNAL MEDICINE

## 2024-12-17 PROCEDURE — 1159F MED LIST DOCD IN RCRD: CPT | Performed by: INTERNAL MEDICINE

## 2024-12-17 PROCEDURE — 3079F DIAST BP 80-89 MM HG: CPT | Performed by: INTERNAL MEDICINE

## 2024-12-17 PROCEDURE — 3017F COLORECTAL CA SCREEN DOC REV: CPT | Performed by: INTERNAL MEDICINE

## 2024-12-17 PROCEDURE — 1160F RVW MEDS BY RX/DR IN RCRD: CPT | Performed by: INTERNAL MEDICINE

## 2024-12-17 PROCEDURE — 1090F PRES/ABSN URINE INCON ASSESS: CPT | Performed by: INTERNAL MEDICINE

## 2024-12-17 PROCEDURE — 1123F ACP DISCUSS/DSCN MKR DOCD: CPT | Performed by: INTERNAL MEDICINE

## 2024-12-17 PROCEDURE — G8399 PT W/DXA RESULTS DOCUMENT: HCPCS | Performed by: INTERNAL MEDICINE

## 2024-12-17 PROCEDURE — G8427 DOCREV CUR MEDS BY ELIG CLIN: HCPCS | Performed by: INTERNAL MEDICINE

## 2024-12-17 PROCEDURE — 99214 OFFICE O/P EST MOD 30 MIN: CPT | Performed by: INTERNAL MEDICINE

## 2024-12-17 PROCEDURE — 3077F SYST BP >= 140 MM HG: CPT | Performed by: INTERNAL MEDICINE

## 2024-12-17 PROCEDURE — 4004F PT TOBACCO SCREEN RCVD TLK: CPT | Performed by: INTERNAL MEDICINE

## 2024-12-17 PROCEDURE — G8484 FLU IMMUNIZE NO ADMIN: HCPCS | Performed by: INTERNAL MEDICINE

## 2024-12-17 PROCEDURE — G8420 CALC BMI NORM PARAMETERS: HCPCS | Performed by: INTERNAL MEDICINE

## 2024-12-17 RX ORDER — LAMOTRIGINE 25 MG/1
25 TABLET ORAL DAILY
COMMUNITY
End: 2024-12-17

## 2024-12-17 RX ORDER — DULOXETINE 40 MG/1
40 CAPSULE, DELAYED RELEASE ORAL DAILY
Qty: 10 CAPSULE | Refills: 0 | Status: SHIPPED | OUTPATIENT
Start: 2024-12-17

## 2024-12-17 RX ORDER — LOSARTAN POTASSIUM 100 MG/1
100 TABLET ORAL DAILY
COMMUNITY
Start: 2024-12-06

## 2024-12-17 RX ORDER — DULOXETIN HYDROCHLORIDE 20 MG/1
20 CAPSULE, DELAYED RELEASE ORAL DAILY
Qty: 10 CAPSULE | Refills: 0 | Status: SHIPPED | OUTPATIENT
Start: 2024-12-17

## 2024-12-17 RX ORDER — DULOXETIN HYDROCHLORIDE 30 MG/1
30 CAPSULE, DELAYED RELEASE ORAL DAILY
Qty: 10 CAPSULE | Refills: 0 | Status: SHIPPED | OUTPATIENT
Start: 2024-12-17

## 2024-12-17 RX ORDER — DULOXETINE HCL 20 MG
20 CAPSULE,DELAYED RELEASE (ENTERIC COATED) ORAL DAILY
Qty: 20 CAPSULE | Refills: 0 | Status: SHIPPED | OUTPATIENT
Start: 2024-12-17

## 2024-12-17 RX ORDER — ACYCLOVIR 800 MG/1
1 TABLET ORAL
COMMUNITY
End: 2024-12-17

## 2024-12-17 RX ORDER — LEVETIRACETAM 500 MG/1
500 TABLET ORAL 2 TIMES DAILY
Qty: 60 TABLET | Refills: 3 | Status: SHIPPED | OUTPATIENT
Start: 2024-12-17

## 2024-12-17 SDOH — ECONOMIC STABILITY: FOOD INSECURITY: WITHIN THE PAST 12 MONTHS, THE FOOD YOU BOUGHT JUST DIDN'T LAST AND YOU DIDN'T HAVE MONEY TO GET MORE.: NEVER TRUE

## 2024-12-17 SDOH — ECONOMIC STABILITY: FOOD INSECURITY: WITHIN THE PAST 12 MONTHS, YOU WORRIED THAT YOUR FOOD WOULD RUN OUT BEFORE YOU GOT MONEY TO BUY MORE.: NEVER TRUE

## 2024-12-17 SDOH — ECONOMIC STABILITY: INCOME INSECURITY: HOW HARD IS IT FOR YOU TO PAY FOR THE VERY BASICS LIKE FOOD, HOUSING, MEDICAL CARE, AND HEATING?: NOT HARD AT ALL

## 2024-12-17 ASSESSMENT — PATIENT HEALTH QUESTIONNAIRE - PHQ9
9. THOUGHTS THAT YOU WOULD BE BETTER OFF DEAD, OR OF HURTING YOURSELF: NOT AT ALL
7. TROUBLE CONCENTRATING ON THINGS, SUCH AS READING THE NEWSPAPER OR WATCHING TELEVISION: MORE THAN HALF THE DAYS
3. TROUBLE FALLING OR STAYING ASLEEP: MORE THAN HALF THE DAYS
4. FEELING TIRED OR HAVING LITTLE ENERGY: NEARLY EVERY DAY
10. IF YOU CHECKED OFF ANY PROBLEMS, HOW DIFFICULT HAVE THESE PROBLEMS MADE IT FOR YOU TO DO YOUR WORK, TAKE CARE OF THINGS AT HOME, OR GET ALONG WITH OTHER PEOPLE: SOMEWHAT DIFFICULT
SUM OF ALL RESPONSES TO PHQ QUESTIONS 1-9: 15
2. FEELING DOWN, DEPRESSED OR HOPELESS: NEARLY EVERY DAY
SUM OF ALL RESPONSES TO PHQ9 QUESTIONS 1 & 2: 3
SUM OF ALL RESPONSES TO PHQ QUESTIONS 1-9: 15
SUM OF ALL RESPONSES TO PHQ QUESTIONS 1-9: 15
6. FEELING BAD ABOUT YOURSELF - OR THAT YOU ARE A FAILURE OR HAVE LET YOURSELF OR YOUR FAMILY DOWN: NOT AT ALL
5. POOR APPETITE OR OVEREATING: NEARLY EVERY DAY
SUM OF ALL RESPONSES TO PHQ QUESTIONS 1-9: 15
1. LITTLE INTEREST OR PLEASURE IN DOING THINGS: NOT AT ALL
8. MOVING OR SPEAKING SO SLOWLY THAT OTHER PEOPLE COULD HAVE NOTICED. OR THE OPPOSITE, BEING SO FIGETY OR RESTLESS THAT YOU HAVE BEEN MOVING AROUND A LOT MORE THAN USUAL: MORE THAN HALF THE DAYS

## 2024-12-17 ASSESSMENT — ENCOUNTER SYMPTOMS
CHEST TIGHTNESS: 0
CONSTIPATION: 0
ABDOMINAL DISTENTION: 0
BLOOD IN STOOL: 0
VOMITING: 0
NAUSEA: 0

## 2024-12-17 NOTE — TELEPHONE ENCOUNTER
Cymbalta is being weaned off .40mg foll 30mg  foll 20 mg     [facility] pharmacist states they are unable to do half for the CYMBALTA 20 MG extended release capsule- he suggested that possibly to wean her off you can do every other day or something like that- please send a new rx over

## 2024-12-17 NOTE — TELEPHONE ENCOUNTER
[facility] pharmacy called requesting a new prescription be sent over for the cymbalta. They do not do 1/2 so pharmacist states that the 20mg should be good.       Please advise

## 2024-12-17 NOTE — PROGRESS NOTES
Subjective:      Patient ID: Maren Meza is a 69 y.o. female.    12/17/2024  Patient presents with:  Follow-Up from Hospital: 12/7/2024 - 12/16/2024 (9 days)- [facility]- Acute on chronic systolic right heart failure (HCC)      Presented with Chief Complaint: Chest pain  Maren Meza is a 69 y.o. female with a pmh of CKD stage IIIa and heart failure with preserved ejection fraction who presents with left-sided severe chest pain that started on the same day of presentation.  Associated with her symptoms is nausea.  The pain radiated  to her left arm.  She also had  dyspnea on exertion, orthopnea and paroxysmal nocturnal dyspnea.  Stating that she has gained about 3 pounds in 1 week.                            VV   7/21/2023 Patient presents with:  Diabetes: Has an Endocrinologist now at - she will handle all DM needs  Other: Discuss medical condition and has other questions      At [facility] 7/17/23     1. Chest pain, unspecified type R07.9 786.50 ELECTROCARDIOGRAM, COMPLETE     2. S/P PCI of mid/distal LAD with 1 drug eluting stent Synergy 2.5x20mm Z95.5 V45.82     3. S/P PCI of OM1 with 3 non overlapping drug eluting stents (Ostial - Synergy 2.5x8mm, post dilated to 2.9mm, Mid - Synergy 2.5x20mm, post dilated to 2.8mm, Distal - Synergy 2.25x8mm) Z95.5 V45.82     4. S/P PCI of the RCA with a 3.0 x 48 mm Synergy DEANGELO Z95.5 V45.82     5. Essential hypertension I10 401.9     6. Ischemic cardiomyopathy I25.5 414.8     7. Mixed hyperlipidemia E78.2 272.2     8. Chronic diastolic CHF (congestive heart failure) ([facility]) I50.32 428.32   428.0     9. Presence of drug coated stent in LAD coronary artery, October 2019 Z95.5 V45.82     10. Presence of drug coated stent in right coronary artery, October 2019 Z95.5 V45.82     11. Coronary stent restenosis due to progression of disease T82.855A 414.00   I25.10 V45.82          - Continue ASA for life, Plavix minimum of 1 year  - Continue atorvastatin  - Continue

## 2024-12-18 NOTE — DISCHARGE SUMMARY
Name:  Maren Meza /Age/Sex: 1955 (69 y.o. female)   Admit Date: 2024  Discharge Date: 2024    MRN & CSN:  [id number] & [id number] Encounter Date : 2024    Attending:  No att. providers found Discharging Provider: Zhou Woodard MD     Hospital Course:      Maren Meza is a 69 y.o. female who presented with     Chief Complaint   Patient presents with    Chest Pain        The patient was being managed in the hospital for    Essential hypertension  Renal artery stenosis  -Renal duplex nondiagnostic secondary to patient movement  -Hydralazine 100 mg 3 times daily  -Metoprolol 50 mg twice daily  -Increase amlodipine to 10 mg daily  -Outpatient follow-up with Dr. Allred  - Nephrology following and plan for outpatient follow up with Dr. Tirado    Atypical chest pain  -Chronic, recurrent problem  -Imdur and Ranexa  -Evaluated by Cardiology, no ischemic evaluation recommended     Urinary tract infection  -IV antibiotics while inpatient but urine culture showed no growth so was discontinued     Chronic diastolic heart failure without acute exacerbation  -Echo on  showed EF 55 to 60% with grade 2 diastolic dysfunction     KOLBY on chronic kidney disease stage IV  -Suspected contrast-induced nephropathy, creatinine continues relatively stable and plan for follow up with PCP for repeat BMP  -Saez catheter removed  -Nephrology following     Paroxysmal atrial fibrillation  -S/p Watchman     Type 2 diabetes  -Having hypoglycemia episodes recent  -Decrease Lantus to 5 units daily  -Discontinue prandial lispro  -Low-dose sliding scale correctional insulin 4 times daily with meals and nightly  -Check point-of-care glucose 4 times daily with meals and nightly  -Diabetes education following     COPD without acute exacerbation  -Symbicort  -Continue chronic prednisone 10 mg daily     Seizure disorder  -Continue Keppra      AVS provided by nurse at time of discharge, which includes all necessary medical  Results   Component Value Date/Time    CHOL 125 12/08/2024 06:15 AM    HDL 47 12/08/2024 06:15 AM    HDL 58 05/14/2012 03:27 PM    TRIG 86 12/08/2024 06:15 AM     Hemoglobin A1C:   Lab Results   Component Value Date/Time    LABA1C 10.0 12/09/2024 05:39 AM     TSH:   Lab Results   Component Value Date/Time    TSH 1.67 12/08/2024 01:41 AM     Troponin: No results found for: \"TROPONINT\"  Lactic Acid: No results for input(s): \"LACTA\" in the last 72 hours.  BNP: No results for input(s): \"PROBNP\" in the last 72 hours.  UA:  Lab Results   Component Value Date/Time    NITRU Negative 12/14/2024 05:05 PM    COLORU Yellow 12/14/2024 05:05 PM    PHUR 8.0 12/14/2024 05:05 PM    PHUR 5.5 04/05/2024 11:35 AM    LABCAST 20-40 Hyaline 07/06/2017 10:42 PM    WBCUA 1 12/14/2024 05:05 PM    RBCUA 3 12/14/2024 05:05 PM    MUCUS 1+ 05/23/2013 01:27 PM    YEAST Rare Yeast 05/14/2012 03:29 PM    BACTERIA None Seen 12/14/2024 05:05 PM    CLARITYU Clear 12/14/2024 05:05 PM    LEUKOCYTESUR Negative 12/14/2024 05:05 PM    UROBILINOGEN 0.2 12/14/2024 05:05 PM    BILIRUBINUR Negative 12/14/2024 05:05 PM    BLOODU Negative 12/14/2024 05:05 PM    GLUCOSEU Negative 12/14/2024 05:05 PM    GLUCOSEU NEGATIVE 05/14/2012 03:29 PM    KETUA TRACE 12/14/2024 05:05 PM    AMORPHOUS 2+ 08/13/2024 02:01 PM     Urine Cultures:   Lab Results   Component Value Date/Time    LABURIN No growth at 18 to 36 hours 12/12/2024 02:38 AM     Blood Cultures:   Lab Results   Component Value Date/Time    BC No Growth after 4 days of incubation. 12/12/2024 06:11 AM     Lab Results   Component Value Date/Time    BLOODCULT2 No Growth after 4 days of incubation. 12/12/2024 12:39 PM     Organism:   Lab Results   Component Value Date/Time    ORG Aerococcus species 06/04/2024 01:12 AM       Time Spent on Discharge:  35 minutes were spent in patient examination, evaluation, counseling as well as medication reconciliation, prescriptions for required medications, discharge plan and

## 2024-12-23 ENCOUNTER — TELEPHONE (OUTPATIENT)
Dept: PRIMARY CARE CLINIC | Age: 69
End: 2024-12-23

## 2024-12-23 NOTE — TELEPHONE ENCOUNTER
Heidi from [facility] called requesting an up to date med list for patient.      Fax [phone]

## 2024-12-27 ENCOUNTER — TELEPHONE (OUTPATIENT)
Dept: CARDIOLOGY CLINIC | Age: 69
End: 2024-12-27

## 2024-12-27 NOTE — TELEPHONE ENCOUNTER
Would like to speak with Dr. Cardenas regarding recent hospital stay.      She doesn't remember what he told her about her cardiac condition during her recent hospital stay ~ 2 weeks ago.  Would not elaborate further on what specifically she needs to know    Most recent hospitalization for CP on 12/7/24  Cardiac consult from Dr. Garcia 12/8/24  Progress notes from Dr. Cardenas 12/12/24

## 2024-12-27 NOTE — TELEPHONE ENCOUNTER
I am off, and again speak to her on Monday    I told her that her chest pain was not due to heart condition and she should not come to the hospital for every chest pain because coronary angiograms are adversely affecting her kidneys     Gave summary per Dr. Theresa Engel verbalized understanding

## 2025-01-13 ENCOUNTER — TELEPHONE (OUTPATIENT)
Dept: PRIMARY CARE CLINIC | Age: 70
End: 2025-01-13

## 2025-01-14 ENCOUNTER — APPOINTMENT (OUTPATIENT)
Dept: GENERAL RADIOLOGY | Age: 70
End: 2025-01-14
Payer: COMMERCIAL

## 2025-01-14 ENCOUNTER — HOSPITAL ENCOUNTER (INPATIENT)
Age: 70
LOS: 2 days | Discharge: HOME HEALTH | End: 2025-01-16
Attending: HOSPITALIST | Admitting: HOSPITALIST
Payer: COMMERCIAL

## 2025-01-14 DIAGNOSIS — R07.9 CHEST PAIN, UNSPECIFIED TYPE: Primary | ICD-10-CM

## 2025-01-14 DIAGNOSIS — M54.16 LUMBAR RADICULOPATHY, RIGHT: ICD-10-CM

## 2025-01-14 DIAGNOSIS — M79.604 LOW BACK PAIN RADIATING TO RIGHT LEG: ICD-10-CM

## 2025-01-14 DIAGNOSIS — M79.604 LEG PAIN, ANTERIOR, RIGHT: ICD-10-CM

## 2025-01-14 DIAGNOSIS — M54.50 LOW BACK PAIN RADIATING TO RIGHT LEG: ICD-10-CM

## 2025-01-14 LAB
ALBUMIN SERPL-MCNC: 3.7 G/DL (ref 3.4–5)
ALBUMIN/GLOB SERPL: 1.3 {RATIO} (ref 1.1–2.2)
ALP SERPL-CCNC: 97 U/L (ref 40–129)
ALT SERPL-CCNC: 9 U/L (ref 10–40)
ANION GAP SERPL CALCULATED.3IONS-SCNC: 15 MMOL/L (ref 3–16)
AST SERPL-CCNC: 21 U/L (ref 15–37)
BASOPHILS # BLD: 0 K/UL (ref 0–0.2)
BASOPHILS NFR BLD: 1 %
BILIRUB SERPL-MCNC: <0.2 MG/DL (ref 0–1)
BUN SERPL-MCNC: 28 MG/DL (ref 7–20)
CALCIUM SERPL-MCNC: 8.8 MG/DL (ref 8.3–10.6)
CHLORIDE SERPL-SCNC: 104 MMOL/L (ref 99–110)
CO2 SERPL-SCNC: 16 MMOL/L (ref 21–32)
CREAT SERPL-MCNC: 1.7 MG/DL (ref 0.6–1.2)
DEPRECATED RDW RBC AUTO: 15.9 % (ref 12.4–15.4)
EOSINOPHIL # BLD: 0.1 K/UL (ref 0–0.6)
EOSINOPHIL NFR BLD: 2.5 %
GFR SERPLBLD CREATININE-BSD FMLA CKD-EPI: 32 ML/MIN/{1.73_M2}
GLUCOSE SERPL-MCNC: 188 MG/DL (ref 70–99)
HCT VFR BLD AUTO: 27.1 % (ref 36–48)
HGB BLD-MCNC: 8.8 G/DL (ref 12–16)
LYMPHOCYTES # BLD: 1.3 K/UL (ref 1–5.1)
LYMPHOCYTES NFR BLD: 27.5 %
MCH RBC QN AUTO: 32.5 PG (ref 26–34)
MCHC RBC AUTO-ENTMCNC: 32.3 G/DL (ref 31–36)
MCV RBC AUTO: 100.7 FL (ref 80–100)
MONOCYTES # BLD: 0.4 K/UL (ref 0–1.3)
MONOCYTES NFR BLD: 9.2 %
NEUTROPHILS # BLD: 2.8 K/UL (ref 1.7–7.7)
NEUTROPHILS NFR BLD: 59.8 %
PLATELET # BLD AUTO: 216 K/UL (ref 135–450)
PMV BLD AUTO: 8.1 FL (ref 5–10.5)
POTASSIUM SERPL-SCNC: 3.8 MMOL/L (ref 3.5–5.1)
PROT SERPL-MCNC: 6.5 G/DL (ref 6.4–8.2)
RBC # BLD AUTO: 2.69 M/UL (ref 4–5.2)
SODIUM SERPL-SCNC: 135 MMOL/L (ref 136–145)
TROPONIN, HIGH SENSITIVITY: 36 NG/L (ref 0–14)
TROPONIN, HIGH SENSITIVITY: 37 NG/L (ref 0–14)
WBC # BLD AUTO: 4.6 K/UL (ref 4–11)

## 2025-01-14 PROCEDURE — 93005 ELECTROCARDIOGRAM TRACING: CPT | Performed by: EMERGENCY MEDICINE

## 2025-01-14 PROCEDURE — 93005 ELECTROCARDIOGRAM TRACING: CPT | Performed by: PHYSICIAN ASSISTANT

## 2025-01-14 PROCEDURE — 80053 COMPREHEN METABOLIC PANEL: CPT

## 2025-01-14 PROCEDURE — 6360000002 HC RX W HCPCS: Performed by: EMERGENCY MEDICINE

## 2025-01-14 PROCEDURE — 6360000002 HC RX W HCPCS: Performed by: PHYSICIAN ASSISTANT

## 2025-01-14 PROCEDURE — 96374 THER/PROPH/DIAG INJ IV PUSH: CPT

## 2025-01-14 PROCEDURE — 96376 TX/PRO/DX INJ SAME DRUG ADON: CPT

## 2025-01-14 PROCEDURE — 85025 COMPLETE CBC W/AUTO DIFF WBC: CPT

## 2025-01-14 PROCEDURE — 36415 COLL VENOUS BLD VENIPUNCTURE: CPT

## 2025-01-14 PROCEDURE — 84484 ASSAY OF TROPONIN QUANT: CPT

## 2025-01-14 PROCEDURE — 6370000000 HC RX 637 (ALT 250 FOR IP): Performed by: PHYSICIAN ASSISTANT

## 2025-01-14 PROCEDURE — 99285 EMERGENCY DEPT VISIT HI MDM: CPT

## 2025-01-14 PROCEDURE — 1200000000 HC SEMI PRIVATE

## 2025-01-14 PROCEDURE — 71045 X-RAY EXAM CHEST 1 VIEW: CPT

## 2025-01-14 RX ORDER — MORPHINE SULFATE 2 MG/ML
2 INJECTION, SOLUTION INTRAMUSCULAR; INTRAVENOUS ONCE
Status: COMPLETED | OUTPATIENT
Start: 2025-01-14 | End: 2025-01-14

## 2025-01-14 RX ORDER — ASPIRIN 81 MG/1
243 TABLET, CHEWABLE ORAL ONCE
Status: COMPLETED | OUTPATIENT
Start: 2025-01-14 | End: 2025-01-14

## 2025-01-14 RX ADMIN — ASPIRIN 243 MG: 81 TABLET, CHEWABLE ORAL at 18:59

## 2025-01-14 RX ADMIN — MORPHINE SULFATE 2 MG: 2 INJECTION, SOLUTION INTRAMUSCULAR; INTRAVENOUS at 23:02

## 2025-01-14 RX ADMIN — MORPHINE SULFATE 2 MG: 2 INJECTION, SOLUTION INTRAMUSCULAR; INTRAVENOUS at 20:48

## 2025-01-14 RX ADMIN — MORPHINE SULFATE 2 MG: 2 INJECTION, SOLUTION INTRAMUSCULAR; INTRAVENOUS at 19:26

## 2025-01-14 ASSESSMENT — PAIN SCALES - GENERAL
PAINLEVEL_OUTOF10: 10
PAINLEVEL_OUTOF10: 8
PAINLEVEL_OUTOF10: 10

## 2025-01-14 ASSESSMENT — ENCOUNTER SYMPTOMS
BACK PAIN: 0
SORE THROAT: 0
VOMITING: 0
SHORTNESS OF BREATH: 0
NAUSEA: 0
ABDOMINAL PAIN: 0
EYE PAIN: 0
COUGH: 0

## 2025-01-14 ASSESSMENT — PAIN DESCRIPTION - ORIENTATION
ORIENTATION: LEFT
ORIENTATION: LEFT

## 2025-01-14 ASSESSMENT — PAIN DESCRIPTION - LOCATION
LOCATION: CHEST

## 2025-01-14 ASSESSMENT — HEART SCORE: ECG: NON-SPECIFC REPOLARIZATION DISTURBANCE/LBTB/PM

## 2025-01-14 ASSESSMENT — PAIN DESCRIPTION - DESCRIPTORS
DESCRIPTORS: HEAVINESS
DESCRIPTORS: HEAVINESS

## 2025-01-14 ASSESSMENT — PAIN - FUNCTIONAL ASSESSMENT: PAIN_FUNCTIONAL_ASSESSMENT: 0-10

## 2025-01-14 NOTE — ED NOTES
Pt to ED with c/o chest pain starting around 5228-3730. Pt took 3 nitro at 1645 and had slight relief. Pt has hx of heart attack x2. Pt notes she has had 4-5 stents put in. Pt states chest pain is on left side, feels heavy, and radiates up to the left side of her face.

## 2025-01-14 NOTE — ED PROVIDER NOTES
EKG interpretation    Sinus rhythm ventricular rate of 79 short MT interval interval at 92 nonspecific T wave changes no ischemia noted     Tony Simeon MD  01/14/25 5809

## 2025-01-15 LAB
ANION GAP SERPL CALCULATED.3IONS-SCNC: 13 MMOL/L (ref 3–16)
BASOPHILS # BLD: 0 K/UL (ref 0–0.2)
BASOPHILS NFR BLD: 0.7 %
BUN SERPL-MCNC: 29 MG/DL (ref 7–20)
CALCIUM SERPL-MCNC: 9.4 MG/DL (ref 8.3–10.6)
CHLORIDE SERPL-SCNC: 107 MMOL/L (ref 99–110)
CO2 SERPL-SCNC: 21 MMOL/L (ref 21–32)
CREAT SERPL-MCNC: 1.5 MG/DL (ref 0.6–1.2)
DEPRECATED RDW RBC AUTO: 15.6 % (ref 12.4–15.4)
EKG ATRIAL RATE: 67 BPM
EKG ATRIAL RATE: 77 BPM
EKG ATRIAL RATE: 79 BPM
EKG DIAGNOSIS: NORMAL
EKG P AXIS: -25 DEGREES
EKG P AXIS: -26 DEGREES
EKG P AXIS: 14 DEGREES
EKG P-R INTERVAL: 116 MS
EKG P-R INTERVAL: 134 MS
EKG P-R INTERVAL: 92 MS
EKG Q-T INTERVAL: 380 MS
EKG Q-T INTERVAL: 400 MS
EKG Q-T INTERVAL: 442 MS
EKG QRS DURATION: 84 MS
EKG QRS DURATION: 84 MS
EKG QRS DURATION: 86 MS
EKG QTC CALCULATION (BAZETT): 435 MS
EKG QTC CALCULATION (BAZETT): 452 MS
EKG QTC CALCULATION (BAZETT): 467 MS
EKG R AXIS: 19 DEGREES
EKG R AXIS: 49 DEGREES
EKG R AXIS: 57 DEGREES
EKG T AXIS: 75 DEGREES
EKG T AXIS: 78 DEGREES
EKG T AXIS: 91 DEGREES
EKG VENTRICULAR RATE: 67 BPM
EKG VENTRICULAR RATE: 77 BPM
EKG VENTRICULAR RATE: 79 BPM
EOSINOPHIL # BLD: 0.2 K/UL (ref 0–0.6)
EOSINOPHIL NFR BLD: 4.5 %
GFR SERPLBLD CREATININE-BSD FMLA CKD-EPI: 38 ML/MIN/{1.73_M2}
GLUCOSE BLD-MCNC: 123 MG/DL (ref 70–99)
GLUCOSE BLD-MCNC: 130 MG/DL (ref 70–99)
GLUCOSE BLD-MCNC: 135 MG/DL (ref 70–99)
GLUCOSE BLD-MCNC: 136 MG/DL (ref 70–99)
GLUCOSE BLD-MCNC: 144 MG/DL (ref 70–99)
GLUCOSE BLD-MCNC: 156 MG/DL (ref 70–99)
GLUCOSE SERPL-MCNC: 154 MG/DL (ref 70–99)
HCT VFR BLD AUTO: 27.9 % (ref 36–48)
HGB BLD-MCNC: 9.3 G/DL (ref 12–16)
LYMPHOCYTES # BLD: 1.3 K/UL (ref 1–5.1)
LYMPHOCYTES NFR BLD: 36.7 %
MAGNESIUM SERPL-MCNC: 1.88 MG/DL (ref 1.8–2.4)
MCH RBC QN AUTO: 32.6 PG (ref 26–34)
MCHC RBC AUTO-ENTMCNC: 33.4 G/DL (ref 31–36)
MCV RBC AUTO: 97.6 FL (ref 80–100)
MONOCYTES # BLD: 0.2 K/UL (ref 0–1.3)
MONOCYTES NFR BLD: 6.2 %
NEUTROPHILS # BLD: 1.8 K/UL (ref 1.7–7.7)
NEUTROPHILS NFR BLD: 51.9 %
NT-PROBNP SERPL-MCNC: 1571 PG/ML (ref 0–124)
PERFORMED ON: ABNORMAL
PLATELET # BLD AUTO: 230 K/UL (ref 135–450)
PMV BLD AUTO: 8.4 FL (ref 5–10.5)
POTASSIUM SERPL-SCNC: 3.5 MMOL/L (ref 3.5–5.1)
RBC # BLD AUTO: 2.86 M/UL (ref 4–5.2)
SODIUM SERPL-SCNC: 141 MMOL/L (ref 136–145)
WBC # BLD AUTO: 3.5 K/UL (ref 4–11)

## 2025-01-15 PROCEDURE — 36415 COLL VENOUS BLD VENIPUNCTURE: CPT

## 2025-01-15 PROCEDURE — 6360000002 HC RX W HCPCS

## 2025-01-15 PROCEDURE — 85025 COMPLETE CBC W/AUTO DIFF WBC: CPT

## 2025-01-15 PROCEDURE — 94760 N-INVAS EAR/PLS OXIMETRY 1: CPT

## 2025-01-15 PROCEDURE — 80048 BASIC METABOLIC PNL TOTAL CA: CPT

## 2025-01-15 PROCEDURE — 1200000000 HC SEMI PRIVATE

## 2025-01-15 PROCEDURE — 2500000003 HC RX 250 WO HCPCS: Performed by: HOSPITALIST

## 2025-01-15 PROCEDURE — 99222 1ST HOSP IP/OBS MODERATE 55: CPT | Performed by: NURSE PRACTITIONER

## 2025-01-15 PROCEDURE — 6370000000 HC RX 637 (ALT 250 FOR IP)

## 2025-01-15 PROCEDURE — 83735 ASSAY OF MAGNESIUM: CPT

## 2025-01-15 PROCEDURE — 93010 ELECTROCARDIOGRAM REPORT: CPT | Performed by: INTERNAL MEDICINE

## 2025-01-15 PROCEDURE — 83880 ASSAY OF NATRIURETIC PEPTIDE: CPT

## 2025-01-15 PROCEDURE — 6360000002 HC RX W HCPCS: Performed by: NURSE PRACTITIONER

## 2025-01-15 PROCEDURE — 6370000000 HC RX 637 (ALT 250 FOR IP): Performed by: HOSPITALIST

## 2025-01-15 PROCEDURE — 6370000000 HC RX 637 (ALT 250 FOR IP): Performed by: NURSE PRACTITIONER

## 2025-01-15 PROCEDURE — 94640 AIRWAY INHALATION TREATMENT: CPT

## 2025-01-15 PROCEDURE — 6360000002 HC RX W HCPCS: Performed by: HOSPITALIST

## 2025-01-15 RX ORDER — ACETAMINOPHEN 650 MG/1
650 SUPPOSITORY RECTAL EVERY 6 HOURS PRN
Status: DISCONTINUED | OUTPATIENT
Start: 2025-01-15 | End: 2025-01-16 | Stop reason: HOSPADM

## 2025-01-15 RX ORDER — MORPHINE SULFATE 2 MG/ML
1 INJECTION, SOLUTION INTRAMUSCULAR; INTRAVENOUS
Status: DISCONTINUED | OUTPATIENT
Start: 2025-01-15 | End: 2025-01-16 | Stop reason: HOSPADM

## 2025-01-15 RX ORDER — FUROSEMIDE 10 MG/ML
40 INJECTION INTRAMUSCULAR; INTRAVENOUS ONCE
Status: COMPLETED | OUTPATIENT
Start: 2025-01-15 | End: 2025-01-15

## 2025-01-15 RX ORDER — AMLODIPINE BESYLATE 10 MG/1
10 TABLET ORAL DAILY
Status: DISCONTINUED | OUTPATIENT
Start: 2025-01-15 | End: 2025-01-16 | Stop reason: HOSPADM

## 2025-01-15 RX ORDER — ISOSORBIDE MONONITRATE 60 MG/1
60 TABLET, EXTENDED RELEASE ORAL DAILY
Status: DISCONTINUED | OUTPATIENT
Start: 2025-01-15 | End: 2025-01-16 | Stop reason: HOSPADM

## 2025-01-15 RX ORDER — HYDRALAZINE HYDROCHLORIDE 50 MG/1
100 TABLET, FILM COATED ORAL 3 TIMES DAILY
Status: DISCONTINUED | OUTPATIENT
Start: 2025-01-15 | End: 2025-01-16 | Stop reason: HOSPADM

## 2025-01-15 RX ORDER — DEXTROSE MONOHYDRATE 100 MG/ML
INJECTION, SOLUTION INTRAVENOUS CONTINUOUS PRN
Status: DISCONTINUED | OUTPATIENT
Start: 2025-01-15 | End: 2025-01-16 | Stop reason: HOSPADM

## 2025-01-15 RX ORDER — MORPHINE SULFATE 4 MG/ML
4 INJECTION, SOLUTION INTRAMUSCULAR; INTRAVENOUS
Status: DISCONTINUED | OUTPATIENT
Start: 2025-01-15 | End: 2025-01-15

## 2025-01-15 RX ORDER — LOSARTAN POTASSIUM 100 MG/1
100 TABLET ORAL DAILY
Status: DISCONTINUED | OUTPATIENT
Start: 2025-01-15 | End: 2025-01-16 | Stop reason: HOSPADM

## 2025-01-15 RX ORDER — DULOXETIN HYDROCHLORIDE 20 MG/1
20 CAPSULE, DELAYED RELEASE ORAL DAILY
Status: DISCONTINUED | OUTPATIENT
Start: 2025-01-15 | End: 2025-01-15

## 2025-01-15 RX ORDER — MORPHINE SULFATE 2 MG/ML
2 INJECTION, SOLUTION INTRAMUSCULAR; INTRAVENOUS
Status: DISCONTINUED | OUTPATIENT
Start: 2025-01-15 | End: 2025-01-15

## 2025-01-15 RX ORDER — METOPROLOL SUCCINATE 50 MG/1
50 TABLET, EXTENDED RELEASE ORAL 2 TIMES DAILY
Status: DISCONTINUED | OUTPATIENT
Start: 2025-01-15 | End: 2025-01-16 | Stop reason: HOSPADM

## 2025-01-15 RX ORDER — SODIUM CHLORIDE 0.9 % (FLUSH) 0.9 %
5-40 SYRINGE (ML) INJECTION EVERY 12 HOURS SCHEDULED
Status: DISCONTINUED | OUTPATIENT
Start: 2025-01-15 | End: 2025-01-16 | Stop reason: HOSPADM

## 2025-01-15 RX ORDER — LAMOTRIGINE 25 MG/1
25 TABLET ORAL DAILY
COMMUNITY

## 2025-01-15 RX ORDER — LAMOTRIGINE 25 MG/1
25 TABLET ORAL DAILY
Status: DISCONTINUED | OUTPATIENT
Start: 2025-01-15 | End: 2025-01-16 | Stop reason: HOSPADM

## 2025-01-15 RX ORDER — QUETIAPINE FUMARATE 100 MG/1
100 TABLET, FILM COATED ORAL NIGHTLY
Status: DISCONTINUED | OUTPATIENT
Start: 2025-01-15 | End: 2025-01-16 | Stop reason: HOSPADM

## 2025-01-15 RX ORDER — AMITRIPTYLINE HYDROCHLORIDE 10 MG/1
40 TABLET ORAL NIGHTLY
Status: DISCONTINUED | OUTPATIENT
Start: 2025-01-15 | End: 2025-01-16 | Stop reason: HOSPADM

## 2025-01-15 RX ORDER — INSULIN GLARGINE 100 [IU]/ML
5 INJECTION, SOLUTION SUBCUTANEOUS NIGHTLY
Status: DISCONTINUED | OUTPATIENT
Start: 2025-01-15 | End: 2025-01-16 | Stop reason: HOSPADM

## 2025-01-15 RX ORDER — GLUCAGON 1 MG/ML
1 KIT INJECTION PRN
Status: DISCONTINUED | OUTPATIENT
Start: 2025-01-15 | End: 2025-01-16 | Stop reason: HOSPADM

## 2025-01-15 RX ORDER — SODIUM CHLORIDE 0.9 % (FLUSH) 0.9 %
5-40 SYRINGE (ML) INJECTION PRN
Status: DISCONTINUED | OUTPATIENT
Start: 2025-01-15 | End: 2025-01-16 | Stop reason: HOSPADM

## 2025-01-15 RX ORDER — PANTOPRAZOLE SODIUM 40 MG/1
40 TABLET, DELAYED RELEASE ORAL
Status: DISCONTINUED | OUTPATIENT
Start: 2025-01-15 | End: 2025-01-16 | Stop reason: HOSPADM

## 2025-01-15 RX ORDER — LEVETIRACETAM 500 MG/1
500 TABLET ORAL 2 TIMES DAILY
Status: DISCONTINUED | OUTPATIENT
Start: 2025-01-15 | End: 2025-01-16 | Stop reason: HOSPADM

## 2025-01-15 RX ORDER — ALBUTEROL SULFATE 0.83 MG/ML
2.5 SOLUTION RESPIRATORY (INHALATION) EVERY 4 HOURS PRN
Status: DISCONTINUED | OUTPATIENT
Start: 2025-01-15 | End: 2025-01-16 | Stop reason: HOSPADM

## 2025-01-15 RX ORDER — ACETAMINOPHEN 325 MG/1
650 TABLET ORAL EVERY 6 HOURS PRN
Status: DISCONTINUED | OUTPATIENT
Start: 2025-01-15 | End: 2025-01-16 | Stop reason: HOSPADM

## 2025-01-15 RX ORDER — MONTELUKAST SODIUM 10 MG/1
10 TABLET ORAL DAILY
Status: DISCONTINUED | OUTPATIENT
Start: 2025-01-15 | End: 2025-01-16 | Stop reason: HOSPADM

## 2025-01-15 RX ORDER — ONDANSETRON 2 MG/ML
4 INJECTION INTRAMUSCULAR; INTRAVENOUS EVERY 6 HOURS PRN
Status: DISCONTINUED | OUTPATIENT
Start: 2025-01-15 | End: 2025-01-16 | Stop reason: HOSPADM

## 2025-01-15 RX ORDER — BUDESONIDE AND FORMOTEROL FUMARATE DIHYDRATE 160; 4.5 UG/1; UG/1
2 AEROSOL RESPIRATORY (INHALATION)
Status: DISCONTINUED | OUTPATIENT
Start: 2025-01-15 | End: 2025-01-16 | Stop reason: HOSPADM

## 2025-01-15 RX ORDER — ATORVASTATIN CALCIUM 80 MG/1
80 TABLET, FILM COATED ORAL NIGHTLY
Status: DISCONTINUED | OUTPATIENT
Start: 2025-01-15 | End: 2025-01-16 | Stop reason: HOSPADM

## 2025-01-15 RX ORDER — ONDANSETRON 4 MG/1
4 TABLET, ORALLY DISINTEGRATING ORAL EVERY 8 HOURS PRN
Status: DISCONTINUED | OUTPATIENT
Start: 2025-01-15 | End: 2025-01-16 | Stop reason: HOSPADM

## 2025-01-15 RX ORDER — MEMANTINE HYDROCHLORIDE 5 MG/1
5 TABLET ORAL DAILY
COMMUNITY

## 2025-01-15 RX ORDER — FLUTICASONE PROPIONATE 50 MCG
1 SPRAY, SUSPENSION (ML) NASAL DAILY PRN
Status: DISCONTINUED | OUTPATIENT
Start: 2025-01-15 | End: 2025-01-16 | Stop reason: HOSPADM

## 2025-01-15 RX ORDER — ENOXAPARIN SODIUM 100 MG/ML
1 INJECTION SUBCUTANEOUS NIGHTLY
Status: DISCONTINUED | OUTPATIENT
Start: 2025-01-15 | End: 2025-01-16 | Stop reason: HOSPADM

## 2025-01-15 RX ORDER — HYDROXYZINE HYDROCHLORIDE 25 MG/1
25 TABLET, FILM COATED ORAL 3 TIMES DAILY PRN
Status: DISCONTINUED | OUTPATIENT
Start: 2025-01-15 | End: 2025-01-16 | Stop reason: HOSPADM

## 2025-01-15 RX ORDER — ASPIRIN 81 MG/1
81 TABLET, CHEWABLE ORAL DAILY
Status: DISCONTINUED | OUTPATIENT
Start: 2025-01-15 | End: 2025-01-16 | Stop reason: HOSPADM

## 2025-01-15 RX ORDER — NITROGLYCERIN 0.4 MG/1
0.4 TABLET SUBLINGUAL EVERY 5 MIN PRN
Status: DISCONTINUED | OUTPATIENT
Start: 2025-01-15 | End: 2025-01-16 | Stop reason: HOSPADM

## 2025-01-15 RX ORDER — POLYETHYLENE GLYCOL 3350 17 G/17G
17 POWDER, FOR SOLUTION ORAL DAILY PRN
Status: DISCONTINUED | OUTPATIENT
Start: 2025-01-15 | End: 2025-01-16 | Stop reason: HOSPADM

## 2025-01-15 RX ORDER — MORPHINE SULFATE 2 MG/ML
2 INJECTION, SOLUTION INTRAMUSCULAR; INTRAVENOUS
Status: DISCONTINUED | OUTPATIENT
Start: 2025-01-15 | End: 2025-01-16 | Stop reason: HOSPADM

## 2025-01-15 RX ORDER — SODIUM CHLORIDE 9 MG/ML
INJECTION, SOLUTION INTRAVENOUS PRN
Status: DISCONTINUED | OUTPATIENT
Start: 2025-01-15 | End: 2025-01-16 | Stop reason: HOSPADM

## 2025-01-15 RX ORDER — DIPHENHYDRAMINE HCL 25 MG
25 TABLET ORAL EVERY 8 HOURS PRN
Status: DISCONTINUED | OUTPATIENT
Start: 2025-01-15 | End: 2025-01-16 | Stop reason: HOSPADM

## 2025-01-15 RX ADMIN — AMLODIPINE BESYLATE 10 MG: 10 TABLET ORAL at 09:19

## 2025-01-15 RX ADMIN — MORPHINE SULFATE 4 MG: 4 INJECTION, SOLUTION INTRAMUSCULAR; INTRAVENOUS at 00:54

## 2025-01-15 RX ADMIN — MORPHINE SULFATE 4 MG: 4 INJECTION, SOLUTION INTRAMUSCULAR; INTRAVENOUS at 04:16

## 2025-01-15 RX ADMIN — METOPROLOL SUCCINATE 50 MG: 50 TABLET, EXTENDED RELEASE ORAL at 09:18

## 2025-01-15 RX ADMIN — METOPROLOL SUCCINATE 50 MG: 50 TABLET, EXTENDED RELEASE ORAL at 20:38

## 2025-01-15 RX ADMIN — LEVETIRACETAM 500 MG: 500 TABLET, FILM COATED ORAL at 20:38

## 2025-01-15 RX ADMIN — MORPHINE SULFATE 2 MG: 2 INJECTION, SOLUTION INTRAMUSCULAR; INTRAVENOUS at 20:35

## 2025-01-15 RX ADMIN — FUROSEMIDE 40 MG: 10 INJECTION, SOLUTION INTRAMUSCULAR; INTRAVENOUS at 12:05

## 2025-01-15 RX ADMIN — HYDROXYZINE HYDROCHLORIDE 25 MG: 25 TABLET, FILM COATED ORAL at 14:24

## 2025-01-15 RX ADMIN — ENOXAPARIN SODIUM 50 MG: 100 INJECTION SUBCUTANEOUS at 20:37

## 2025-01-15 RX ADMIN — ACETAMINOPHEN 650 MG: 325 TABLET ORAL at 13:18

## 2025-01-15 RX ADMIN — ENOXAPARIN SODIUM 50 MG: 100 INJECTION SUBCUTANEOUS at 00:56

## 2025-01-15 RX ADMIN — ONDANSETRON 4 MG: 2 INJECTION, SOLUTION INTRAMUSCULAR; INTRAVENOUS at 16:26

## 2025-01-15 RX ADMIN — NITROGLYCERIN 0.4 MG: 0.4 TABLET SUBLINGUAL at 13:19

## 2025-01-15 RX ADMIN — ATORVASTATIN CALCIUM 80 MG: 80 TABLET, FILM COATED ORAL at 20:38

## 2025-01-15 RX ADMIN — ASPIRIN 81 MG: 81 TABLET, CHEWABLE ORAL at 09:18

## 2025-01-15 RX ADMIN — HYDRALAZINE HYDROCHLORIDE 100 MG: 50 TABLET ORAL at 20:38

## 2025-01-15 RX ADMIN — EMPAGLIFLOZIN 10 MG: 10 TABLET, FILM COATED ORAL at 14:24

## 2025-01-15 RX ADMIN — INSULIN GLARGINE 5 UNITS: 100 INJECTION, SOLUTION SUBCUTANEOUS at 00:59

## 2025-01-15 RX ADMIN — QUETIAPINE FUMARATE 100 MG: 100 TABLET ORAL at 20:38

## 2025-01-15 RX ADMIN — QUETIAPINE FUMARATE 100 MG: 100 TABLET ORAL at 00:56

## 2025-01-15 RX ADMIN — ONDANSETRON 4 MG: 2 INJECTION, SOLUTION INTRAMUSCULAR; INTRAVENOUS at 04:17

## 2025-01-15 RX ADMIN — ATORVASTATIN CALCIUM 80 MG: 80 TABLET, FILM COATED ORAL at 00:56

## 2025-01-15 RX ADMIN — MORPHINE SULFATE 2 MG: 2 INJECTION, SOLUTION INTRAMUSCULAR; INTRAVENOUS at 09:11

## 2025-01-15 RX ADMIN — BUDESONIDE AND FORMOTEROL FUMARATE DIHYDRATE 2 PUFF: 160; 4.5 AEROSOL RESPIRATORY (INHALATION) at 07:43

## 2025-01-15 RX ADMIN — TICAGRELOR 90 MG: 90 TABLET ORAL at 00:56

## 2025-01-15 RX ADMIN — HYDRALAZINE HYDROCHLORIDE 100 MG: 50 TABLET ORAL at 13:18

## 2025-01-15 RX ADMIN — ISOSORBIDE MONONITRATE 60 MG: 60 TABLET, EXTENDED RELEASE ORAL at 14:24

## 2025-01-15 RX ADMIN — MORPHINE SULFATE 2 MG: 2 INJECTION, SOLUTION INTRAMUSCULAR; INTRAVENOUS at 16:25

## 2025-01-15 RX ADMIN — TICAGRELOR 90 MG: 90 TABLET ORAL at 09:18

## 2025-01-15 RX ADMIN — SODIUM CHLORIDE, PRESERVATIVE FREE 10 ML: 5 INJECTION INTRAVENOUS at 20:39

## 2025-01-15 RX ADMIN — LEVETIRACETAM 500 MG: 500 TABLET, FILM COATED ORAL at 00:56

## 2025-01-15 RX ADMIN — PANTOPRAZOLE SODIUM 40 MG: 40 TABLET, DELAYED RELEASE ORAL at 06:14

## 2025-01-15 RX ADMIN — LEVETIRACETAM 500 MG: 500 TABLET, FILM COATED ORAL at 09:19

## 2025-01-15 RX ADMIN — TICAGRELOR 90 MG: 90 TABLET ORAL at 20:38

## 2025-01-15 RX ADMIN — HYDRALAZINE HYDROCHLORIDE 100 MG: 50 TABLET ORAL at 09:18

## 2025-01-15 RX ADMIN — ONDANSETRON 4 MG: 2 INJECTION, SOLUTION INTRAMUSCULAR; INTRAVENOUS at 09:11

## 2025-01-15 RX ADMIN — METOPROLOL SUCCINATE 50 MG: 50 TABLET, EXTENDED RELEASE ORAL at 00:56

## 2025-01-15 RX ADMIN — LOSARTAN POTASSIUM 100 MG: 100 TABLET, FILM COATED ORAL at 09:19

## 2025-01-15 RX ADMIN — MONTELUKAST 10 MG: 10 TABLET, FILM COATED ORAL at 09:18

## 2025-01-15 RX ADMIN — BUDESONIDE AND FORMOTEROL FUMARATE DIHYDRATE 2 PUFF: 160; 4.5 AEROSOL RESPIRATORY (INHALATION) at 20:06

## 2025-01-15 RX ADMIN — MORPHINE SULFATE 2 MG: 2 INJECTION, SOLUTION INTRAMUSCULAR; INTRAVENOUS at 12:05

## 2025-01-15 RX ADMIN — INSULIN GLARGINE 5 UNITS: 100 INJECTION, SOLUTION SUBCUTANEOUS at 20:35

## 2025-01-15 ASSESSMENT — PAIN DESCRIPTION - LOCATION
LOCATION: ARM;JAW;CHEST
LOCATION: CHEST
LOCATION: ARM;JAW;CHEST
LOCATION: CHEST

## 2025-01-15 ASSESSMENT — PAIN DESCRIPTION - PAIN TYPE
TYPE: ACUTE PAIN

## 2025-01-15 ASSESSMENT — PAIN DESCRIPTION - ORIENTATION
ORIENTATION: LEFT
ORIENTATION: MID
ORIENTATION: LEFT
ORIENTATION: LEFT

## 2025-01-15 ASSESSMENT — PAIN SCALES - GENERAL
PAINLEVEL_OUTOF10: 10
PAINLEVEL_OUTOF10: 0
PAINLEVEL_OUTOF10: 9
PAINLEVEL_OUTOF10: 10
PAINLEVEL_OUTOF10: 8
PAINLEVEL_OUTOF10: 0
PAINLEVEL_OUTOF10: 8
PAINLEVEL_OUTOF10: 0

## 2025-01-15 ASSESSMENT — PAIN - FUNCTIONAL ASSESSMENT
PAIN_FUNCTIONAL_ASSESSMENT: PREVENTS OR INTERFERES SOME ACTIVE ACTIVITIES AND ADLS

## 2025-01-15 ASSESSMENT — PAIN DESCRIPTION - FREQUENCY
FREQUENCY: INTERMITTENT
FREQUENCY: CONTINUOUS

## 2025-01-15 ASSESSMENT — PAIN DESCRIPTION - ONSET
ONSET: ON-GOING
ONSET: PROGRESSIVE

## 2025-01-15 ASSESSMENT — PAIN DESCRIPTION - DESCRIPTORS
DESCRIPTORS: HEAVINESS

## 2025-01-15 NOTE — ED PROVIDER NOTES
**ADVANCED PRACTICE PROVIDER, I HAVE EVALUATED THIS PATIENT**        [facility] EMERGENCY DEPARTMENT  EMERGENCY DEPARTMENT ENCOUNTER      Pt Name: Maren Meza  MRN:[id number]  Birthdate 1955  Date of evaluation: 1/14/2025  Provider: Mike Syed PA-C  Note Started: 7:21 PM EST 1/14/25        Chief Complaint:    Chief Complaint   Patient presents with    Chest Pain     C/o left sided chest pain starting 5pm. Pt took 3 nitro with some relief. Pt notes recent diagnosis of CHF         Nursing Notes, Past Medical Hx, Past Surgical Hx, Social Hx, Allergies, and Family Hx were all reviewed and agreed with or any disagreements were addressed in the HPI.    HPI: (Location, Duration, Timing, Severity, Quality, Assoc Sx, Context, Modifying factors)    History From: Patient  Limitations to history : None    Social Determinants Significantly Affecting Health : None    Chief Complaint of chest pain.  Patient complained of left-sided chest pain that radiated to her left jaw and down her left arm.  She was given rated cook in her kitchen standing area when the pain came on suddenly.  She took 3 nitroglycerin and the pain just eased up a little bit.  And she took baby aspirin 81 mg today that she normally takes.  History of MI.  Says it feels similar to when she had a heart attack in the past.  She has 4-5 stents she states.  Also has a Watchman.  States she is not on any blood thinners.  She denies any abdominal pain.  Rated pain 7 out of 10 now.  Denies any lightheaded or dizziness.  No extremity weakness.  No slurred speech.  No facial drooping noted.  No other complaints.    This is a  69 y.o. female who presents to the emergency room with the above complaint    PastMedical/Surgical History:      Diagnosis Date    Acid reflux     Anemia     Anxiety and depression     Arthritis     Asthma     Atrial fibrillation (HCC)     CAD (coronary artery disease) 12/3/2012    Cerebral artery occlusion with cerebral  Medications    No medications on file              (Please note the MDM and HPI sections of this note were completed with a voice recognition program.  Efforts were made to edit the dictations but occasionally words are mis-transcribed.)    Electronically signed, Mike Syed PA-C,          Mike Syed PA-C  01/18/25 8237

## 2025-01-15 NOTE — ED PROVIDER NOTES
I independently performed a history and physical on Maren Meza. All diagnostic, treatment, and disposition decisions were made by myself in conjunction with the advanced practice provider. I have participated in the medical decision making and directed the treatment plan and disposition of the patient.     For further details of Maren Meza's emergency department encounter, please see the advanced practice provider's Dylan's  documentation.     CHIEF COMPLAINT:  Chief Complaint   Patient presents with    Chest Pain     C/o left sided chest pain starting 5pm. Pt took 3 nitro with some relief. Pt notes recent diagnosis of CHF        HPI:  Briefly, Maren Meza is a 69 y.o. female  with a several hour h/o onset of anterior chest pain      PHYSICAL EXAM:  ED Triage Vitals [25 1833]   BP Systolic BP Percentile Diastolic BP Percentile Temp Temp Source Pulse Respirations SpO2   (!) 218/65 -- -- 97.9 °F (36.6 °C) Oral 81 13 100 %      Height Weight - Scale         1.524 m (5') 49.9 kg (110 lb 0.2 oz)             Patient is in no acute distress.    Lungs are clear to auscultation throughout all lung fields.    Cardiac exam reveals a regular rate and rhythm, with no significant venous distention, and intact radial pulses.    Abdomen soft, nondistended.  Nontender.  No significant lower extremity edema.      EC LEAD EKG AS INTERPRETED BY ME:  NSR  RATE OF 77  NORMAL AXIS   NORMAL INTERVALS  NO ST-T SIGNS OF ACUTE ISCHEMIA OR INFARCT       MDM:   Chest pain: Patient with significant history of coronary artery disease and elevated troponin with current and persistent chest pain.  Patient will be admitted to [facility] for further cardiac care, consultation and assessment.    Please see the STEPHEN note for further details regarding disposition, instructions, and precautions.      Critical Care:      During the patient's ED course, the patient was given:   Medications   morphine (PF) injection 2 mg (2 mg  IntraVENous Given 1/14/25 1926)   aspirin chewable tablet 243 mg (243 mg Oral Given 1/14/25 1859)   morphine (PF) injection 2 mg (2 mg IntraVENous Given 1/14/25 2048)        CLINICAL IMPRESSION   1. Chest pain, unspecified type         DISPOSITION   DISPOSITION Decision To Admit 01/14/2025 08:40:12 PM   DISPOSITION CONDITION Stable           This chart was created using Dragon dictation software. Efforts were made by me to ensure accuracy, however some errors may be present due to limitations of this technology.         Rohit Donnelly MD  01/14/25 8513

## 2025-01-15 NOTE — PROGRESS NOTES
Pharmacy Medication Reconciliation Note     List of medications patient is currently taking is complete.    Source of information:   1. Conversation with patient  2. EMR    Notes regarding home medications:   Added lamotrigine to list, updated Lantus dose, and removed prednisone      Hilary Cameron PharmD  1/15/2025 3:01 PM

## 2025-01-15 NOTE — ED NOTES
Report given to Yao DE LA VEGA at [facility] ED. RN has no further questions at this time.    Primary Defect Width In Cm (Final Defect Size - Required For Flaps/Grafts): 1.1

## 2025-01-15 NOTE — PROGRESS NOTES
4 Eyes Skin Assessment     NAME:  Maren Meza  YOB: 1955  MEDICAL RECORD NUMBER:  [id number]    The patient is being assessed for  Admission    I agree that at least one RN has performed a thorough Head to Toe Skin Assessment on the patient. ALL assessment sites listed below have been assessed.      Areas assessed by both nurses:    Head, Face, Ears, Shoulders, Back, Chest, Arms, Elbows, Hands, Sacrum. Buttock, Coccyx, Ischium, Legs. Feet and Heels, and Under Medical Devices         Does the Patient have a Wound? No noted wound(s)       Rakan Prevention initiated by RN: No  Wound Care Orders initiated by RN: No    Pressure Injury (Stage 3,4, Unstageable, DTI, NWPT, and Complex wounds) if present, place Wound referral order by RN under : No    New Ostomies, if present place, Ostomy referral order under : No     Nurse 1 eSignature: Electronically signed by Mariza Darling RN on 1/15/25 at 1:05 AM EST    **SHARE this note so that the co-signing nurse can place an eSignature**    Nurse 2 eSignature: Electronically signed by Nicole Ruelas RN on 1/15/25 at 5:12 AM EST

## 2025-01-15 NOTE — CONSULTS
Cardiology Consultation   Date: 1/15/2025  Admit Date:  1/14/2025  Admission Diagnosis: NSTEMI (non-ST elevated myocardial infarction) (Formerly [facility]) [I21.4]  Chest pain, unspecified type [R07.9]     Reason for Consultation: chest pain  Consult Requesting Physician: Oly Carter MD     History of Present Illness  Maren Meza is a 69 y.o. year old female with past medical history significant for CAD/PCI to LAD, RCA, OM1- multiple LHC in past, chronic angina, DM2, HTN, HLP, CKD 3b, anemia, DHF, ICM. Former tobacco abuse.   Hx med non compliance. Follows w [facility]- last OV 5/21/2024.  Adm 6/27-7/4/2024 w GIB>cont on asa brilinta at [place].   Multiple admissions for CP and HF.    Adm to [facility] with chest pain X1 day. Midsternal, constant pressure. Associated with dizziness, SOB and abd bloating. CP with some improvement after NTG SL.   /65 on admission.   Reports she has been compliant with meds and Na/fluid restrictions at home.     Pro BNP 1571  HS trop 36,27.     Past Medical History:   Diagnosis Date    Acid reflux     Anemia     Anxiety and depression     Arthritis     Asthma     Atrial fibrillation ([facility])     CAD (coronary artery disease) 12/3/2012    Cerebral artery occlusion with cerebral infarction ([facility])     TIA\"\"S--right sided weakness & headache    CHF (congestive heart failure) ([facility]ly [facility])     Chronic kidney disease--stage III     40% kidney function    COPD (chronic obstructive pulmonary disease) ([facility])     DM2 (diabetes mellitus, type 2) ([facility]ly [facility])     Dysarthria     Fibromyalgia 6/7/2016    Headache(784.0) 2/19/2013    Hemisensory loss     History of blood transfusion 11/2020    pt denies having transfusion reaction    Hyperlipidemia     Hypertension     IBS (irritable bowel syndrome)     Inferior vena cava occlusion (HCC)     Keratitis     Meningioma (HCC)     MI, old     Neuropathy     Superior vena cava obstruction     Temporal arteritis (HCC) 2/24/2014    Wears glasses         Past Surgical History:  Daily    pantoprazole  40 mg Oral QAM AC    QUEtiapine  100 mg Oral Nightly    ticagrelor  90 mg Oral BID    sodium chloride flush  5-40 mL IntraVENous 2 times per day    enoxaparin  1 mg/kg SubCUTAneous Nightly    furosemide  40 mg IntraVENous Once      Continuous Infusions:   sodium chloride      dextrose       PRN Meds:.albuterol, fluticasone, hydrOXYzine HCl, nitroGLYCERIN, sodium chloride flush, sodium chloride, ondansetron **OR** ondansetron, polyethylene glycol, acetaminophen **OR** acetaminophen, glucose, dextrose bolus **OR** dextrose bolus, glucagon (rDNA), dextrose, morphine **OR** morphine     Physical Examination:  BP (!) 171/75   Pulse 59   Temp 97.6 °F (36.4 °C) (Oral)   Resp 16   Ht 1.524 m (5')   Wt 48.1 kg (106 lb 0.7 oz)   SpO2 98%   BMI 20.71 kg/m²         Intake/Output Summary (Last 24 hours) at 1/15/2025 1138  Last data filed at 1/15/2025 0925  Gross per 24 hour   Intake 370 ml   Output 375 ml   Net -5 ml     I/O since adm:     WEIGHT:Admit Weight - Scale: 49.9 kg (110 lb 0.2 oz)         Today  Weight - Scale: 48.1 kg (106 lb 0.7 oz)   DRY WEIGHT:  Wt Readings from Last 3 Encounters:   01/15/25 48.1 kg (106 lb 0.7 oz)   24 51 kg (112 lb 6.4 oz)   24 50.4 kg (111 lb 1.8 oz)         Temp  Av.7 °F (36.5 °C)  Min: 97.5 °F (36.4 °C)  Max: 98 °F (36.7 °C)  Pulse  Av.7  Min: 59  Max: 83  BP  Min: 148/68  Max: 218/65  SpO2  Av.7 %  Min: 97 %  Max: 100 %    Physical Exam:  GEN: Appears frail,  no acute distress  SKIN: Brown,warm, dry.  LUNG: AP diameter normal. Crackles bilateral bases. Respiratory effort normal.  HEART: S1S2 A/R. No JVD. No carotid bruit. No murmur, rub or gallop.  ABD: Soft, nontender. +BS X 4 quads. No hepatomegaly.   EXT: Radial and pedal pulses 2+ and symmetric. Without varicosities. No edema.  NEURO: A/O X3. Calm and cooperative.     Labs: I have reviewed all labs below today.   CBC   Recent Labs     01/14/25  1932 01/15/25  0532   WBC 4.6 3.5*

## 2025-01-15 NOTE — CARE COORDINATION
01/15/25 1333   Readmission Assessment   Number of Days since last admission? 8-30 days   Previous Disposition Home with Home Health   Who is being Interviewed Patient   What was the patient's/caregiver's perception as to why they think they needed to return back to the hospital? Other (Comment)  (admitted with chest pain)   Did you visit your Primary Care Physician after you left the hospital, before you returned this time? Yes   Did you see a specialist, such as Cardiac, Pulmonary, Orthopedic Physician, etc. after you left the hospital? No   Who advised the patient to return to the hospital? Self-referral   Does the patient report anything that got in the way of taking their medications? No   In our efforts to provide the best possible care to you and others like you, can you think of anything that we could have done to help you after you left the hospital the first time, so that you might not have needed to return so soon? Other (Comment)  (admitted with chest pain and has hx 4 stints)

## 2025-01-15 NOTE — PROGRESS NOTES
Patient admitted to room 3115 from [facility] via stretcher.  Oriented to room, call light, and floor policies.  Plan of care reviewed with patient.  Assessment completed; VSS. Tele in place reading sinus rhythm.  Pt rates a high fall risk; bed alarm on. Safety precautions in place; call light and bedside table within reach.  Pt encouraged to call for needs or ambulation.  Pt VU.  Will continue to monitor.

## 2025-01-15 NOTE — PLAN OF CARE
Problem: Chronic Conditions and Co-morbidities  Goal: Patient's chronic conditions and co-morbidity symptoms are monitored and maintained or improved  Outcome: Progressing  Flowsheets (Taken 1/15/2025 0001)  Care Plan - Patient's Chronic Conditions and Co-Morbidity Symptoms are Monitored and Maintained or Improved: Monitor and assess patient's chronic conditions and comorbid symptoms for stability, deterioration, or improvement     Problem: Discharge Planning  Goal: Discharge to home or other facility with appropriate resources  Outcome: Progressing  Flowsheets (Taken 1/15/2025 0032)  Discharge to home or other facility with appropriate resources:   Identify barriers to discharge with patient and caregiver   Arrange for needed discharge resources and transportation as appropriate     Problem: Pain  Goal: Verbalizes/displays adequate comfort level or baseline comfort level  Outcome: Progressing  Flowsheets (Taken 1/15/2025 0004)  Verbalizes/displays adequate comfort level or baseline comfort level:   Encourage patient to monitor pain and request assistance   Assess pain using appropriate pain scale   Administer analgesics based on type and severity of pain and evaluate response   Implement non-pharmacological measures as appropriate and evaluate response     Problem: Safety - Adult  Goal: Free from fall injury  Outcome: Progressing     Problem: ABCDS Injury Assessment  Goal: Absence of physical injury  Outcome: Progressing  Flowsheets (Taken 1/15/2025 0028)  Absence of Physical Injury: Implement safety measures based on patient assessment

## 2025-01-15 NOTE — PROGRESS NOTES
Same-day progress note, patient mated after midnight, early this a.m.  See H&P from 1/15/2025.  She is a 69-year-old female with past medical history of CAD s/p multiple stents, heart failure preserved ejection fraction, essential hypertension, type 2 diabetes, seizure disorder, GERD, anxiety who presented to the ED with complaints of left-sided chest pain with no significant improvement with nitroglycerin.  While in the ED, she was noted to be significantly hypertensive to 218/65 but otherwise hemodynamically stable.  Troponin was noted to be mildly elevated and relatively flat.  EKG showed normal sinus rhythm with no ST elevation.  CXR was obtained which also showed no acute cardiopulmonary process.  Cardiology was consulted for recommendations.  Chest pain likely secondary to an acute heart failure exacerbation.  Patient's NT proBNP elevated, she was given dose of IV Lasix.  Jardiance was started by cardiology.      Acute on chronic heart failure preserved ejection fraction  -Most recent echo showed normal EF, grade 2 diastolic dysfunction.  -Does not appear significantly hypervolemic on exam.  NT-proBNP elevated, mild crackles on exam  -Cardiology consulted, IV Lasix x 1 given 1/15  -Continue home medications.  Jardiance added  -Cardiac diet, fluid restriction    Chest pain  Chronic anemia  History of CAD  -Cardiology consulted.  No further ischemic workup recommended.  Chest pain likely secondary to hypertensive urgency, heart failure exacerbation.  -Continue supportive care    Hypertensive urgency  Essential hypertension  -BP better controlled.  Resume home medications

## 2025-01-15 NOTE — PROGRESS NOTES
Pt vomited large amount of green emesis on the way to the toilet . Pt. On the toilet trying to have a bowel movement .

## 2025-01-15 NOTE — CONSULTS
[facility]  Cardiology Consult    Maren Meza  1955    January 15, 2025      Reason for Referral: Chest pain    Referring physician: Justina Munson Jr, MD     CC: ***      Subjective:     History of Present Illness:    Maren Meza is a 69 y.o. patient with a PMH significant for coronary artery disease, diastolic heart failure, paroxsymal atrial fibrillation s/p LAAC 5/16/2022 peripheral arterial disease, hypertension, hyperlipidemia, diabetes, chronic kidney disease and GI bleed. She presented to the emergency room on 1/14/2025 with chest pain. She took 3 nitroglycerin with some relief. Troponin was elevated at 37.          Past Medical History:   has a past medical history of Acid reflux, Anemia, Anxiety and depression, Arthritis, Asthma, Atrial fibrillation (HCC), CAD (coronary artery disease), Cerebral artery occlusion with cerebral infarction (HCC), CHF (congestive heart failure) ([facility]), Chronic kidney disease--stage III, COPD (chronic obstructive pulmonary disease) ([facility]), DM2 (diabetes mellitus, type 2) ([facility]), Dysarthria, Fibromyalgia, Headache(784.0), Hemisensory loss, History of blood transfusion, Hyperlipidemia, Hypertension, IBS (irritable bowel syndrome), Inferior vena cava occlusion (HCC), Keratitis, Meningioma (HCC), MI, old, Neuropathy, Superior vena cava obstruction, Temporal arteritis (HCC), and Wears glasses.    Surgical History:   has a past surgical history that includes Tunneled venous port placement; Cholecystectomy; ablation of dysrhythmic focus (1999  and 11/2020); Cataract removal (Bilateral); joint replacement (Right); Hysterectomy; Artery Biopsy (Right, 04/23/2014); Coronary angioplasty with stent (2020); Knee arthroscopy (Right); Percutaneous Transluminal Coronary Angio (10/2019); Upper gastrointestinal endoscopy (N/A, 7/6/2020); Carotid artery surgery (Left); Colonoscopy (N/A, 4/9/2021); Colonoscopy (N/A, 10/15/2021); Upper gastrointestinal endoscopy (N/A, 7/1/2024);      Echocardiogram:   ECHO 12/9/2024  Left Ventricle: Normal left ventricular systolic function with a visually estimated EF of 55 - 60%. Left ventricle size is normal. Mildly increased wall thickness. Normal wall motion. Grade II diastolic dysfunction.  Right Ventricle: Right ventricle size is normal. Normal systolic function. TAPSE is 1.9 cm.  Left Atrium: Left atrium is dilated.    ECHO 3/14/2024  Overall left ventricular ejection fraction is estimated to be 55-60%.   Left ventricular systolic function is normal. (LVEF>/=55%)   Indeterminate diastolic function.   Left atrium is dilated.   Left ventricular wall motion is normal.   Mild mitral annular calcification.   Aortic valve is mildly calcified.   Trace mitral regurgitation.   Right ventricle is normal size.   Right ventricular systolic function is normal.   Left atrial appendage occlusion device noted     ECHO (Limited) 4/4/2024  Limited study.  There is asymmetric hypertrophy of the basal septum. Overall left  ventricular systolic function appears low normal with an ejection fraction  of 50%. mid to distal septal walls are hypokinetic  The mitral valve leaflets are normal in structure. Mitral annular  calcification is present. Mild mitral regurgitation is present.  The right ventricle is normal in size and function. TAPSE measures 1.64 cm.       Stress Test:   Stress test 8/15/2023  No significant abnormality   Limited study, no stress imaging performed     Cath:  Cardiac cath 3/27/2024  ANGIOGRAM/CORONARY ARTERIOGRAM:        The left main coronary artery is very short without significant disease.     The left anterior descending artery has patent previously placed stent with mild mid vessel 20% ISR.     The left circumflex artery is non-dominant with small AV groove course with minor luminal irregularities.              OM1 has moderate ISR 40% mid vessel              OM2 has mild ISR 30% proximal and mid vessel     The right coronary artery is dominant

## 2025-01-15 NOTE — PROGRESS NOTES
Pt. Complains of 8/10 chest pain hr =67, and B/P =193/86 Abbi CERVANTES was notified of this and EKG was ordered and NTG and tylenol was given by Aletha DE LA VEGA . EKG was done showed NSR and Fe MCKEON put new orders in.

## 2025-01-15 NOTE — ED NOTES
Report given to Mariza DE LA VEGA at [facility] 3W. RN has no further questions at this time.

## 2025-01-15 NOTE — H&P
HISTORY AND PHYSICAL             Date: 1/15/2025        Patient Name: Maren Meza     YOB: 1955      Age:  69 y.o.    Chief Complaint     Chief Complaint   Patient presents with    Chest Pain     C/o left sided chest pain starting 5pm. Pt took 3 nitro with some relief. Pt notes recent diagnosis of CHF          History Obtained From   patient    History of Present Illness   69f with history of CAD presents to the ER following acute onset of chest pain while working in her kitchen. CP worsened with exertion up to 10/10 intensity, persisted prompting ER consultation. Currently patient is seen on room air, no distress, awake, alert, oriented with chest pain currently improved.    Past Medical History     Past Medical History:   Diagnosis Date    Acid reflux     Anemia     Anxiety and depression     Arthritis     Asthma     Atrial fibrillation (HCC)     CAD (coronary artery disease) 12/3/2012    Cerebral artery occlusion with cerebral infarction (HCC)     TIA\"\"S--right sided weakness & headache    CHF (congestive heart failure) ([facility])     Chronic kidney disease--stage III     40% kidney function    COPD (chronic obstructive pulmonary disease) ([facility])     DM2 (diabetes mellitus, type 2) ([facility])     Dysarthria     Fibromyalgia 6/7/2016    Headache(784.0) 2/19/2013    Hemisensory loss     History of blood transfusion 11/2020    pt denies having transfusion reaction    Hyperlipidemia     Hypertension     IBS (irritable bowel syndrome)     Inferior vena cava occlusion (HCC)     Keratitis     Meningioma (HCC)     MI, old     Neuropathy     Superior vena cava obstruction     Temporal arteritis (HCC) 2/24/2014    Wears glasses         Past Surgical History     Past Surgical History:   Procedure Laterality Date    ABLATION OF DYSRHYTHMIC FOCUS  1999  and 11/2020    times 2    ARTERY BIOPSY Right 04/23/2014    RIGHT TEMPORAL ARTERY BIOPSY    CAROTID ARTERY SURGERY Left     clean up per pt    CATARACT REMOVAL  antiplatelet  HTN - continue home meds  DM - continue patient's lantus with corrective iss    Consultations Ordered:  IP CONSULT TO CARDIOLOGY    Electronically signed by Justina Munson Jr, MD on 1/15/25 at 4:27 AM EST

## 2025-01-15 NOTE — PROGRESS NOTES
This RN (charge) to the bedside to answer patient's call light. Patient found in bed reporting 8/10 heavy L sided chest pain that radiates down her arm & to the L side of her jaw. Vitals obtained and are as follows:     HR 66  RR 20   /74  O2 97% on RA     Order placed for STAT EKG. Message sent to BILLY Moreno regarding patient condition. PRN nitro and tylenol administered per patient request + scheduled hydralazine.     Update provided to patient's primary RN Mae. Mae to continue to monitor and assess patient. Patient denies further needs from this RN

## 2025-01-16 VITALS
TEMPERATURE: 98 F | BODY MASS INDEX: 20.82 KG/M2 | RESPIRATION RATE: 16 BRPM | WEIGHT: 106.04 LBS | SYSTOLIC BLOOD PRESSURE: 134 MMHG | DIASTOLIC BLOOD PRESSURE: 68 MMHG | HEART RATE: 67 BPM | OXYGEN SATURATION: 100 % | HEIGHT: 60 IN

## 2025-01-16 LAB
ANION GAP SERPL CALCULATED.3IONS-SCNC: 15 MMOL/L (ref 3–16)
BASOPHILS # BLD: 0 K/UL (ref 0–0.2)
BASOPHILS NFR BLD: 0.9 %
BUN SERPL-MCNC: 30 MG/DL (ref 7–20)
CALCIUM SERPL-MCNC: 9.8 MG/DL (ref 8.3–10.6)
CHLORIDE SERPL-SCNC: 107 MMOL/L (ref 99–110)
CO2 SERPL-SCNC: 21 MMOL/L (ref 21–32)
CREAT SERPL-MCNC: 1.9 MG/DL (ref 0.6–1.2)
DEPRECATED RDW RBC AUTO: 15.6 % (ref 12.4–15.4)
EOSINOPHIL # BLD: 0.1 K/UL (ref 0–0.6)
EOSINOPHIL NFR BLD: 2.6 %
GFR SERPLBLD CREATININE-BSD FMLA CKD-EPI: 28 ML/MIN/{1.73_M2}
GLUCOSE BLD-MCNC: 122 MG/DL (ref 70–99)
GLUCOSE BLD-MCNC: 161 MG/DL (ref 70–99)
GLUCOSE BLD-MCNC: 222 MG/DL (ref 70–99)
GLUCOSE SERPL-MCNC: 116 MG/DL (ref 70–99)
HCT VFR BLD AUTO: 28.9 % (ref 36–48)
HGB BLD-MCNC: 9.5 G/DL (ref 12–16)
LYMPHOCYTES # BLD: 1 K/UL (ref 1–5.1)
LYMPHOCYTES NFR BLD: 26.2 %
MCH RBC QN AUTO: 31.4 PG (ref 26–34)
MCHC RBC AUTO-ENTMCNC: 32.7 G/DL (ref 31–36)
MCV RBC AUTO: 96.1 FL (ref 80–100)
MONOCYTES # BLD: 0.3 K/UL (ref 0–1.3)
MONOCYTES NFR BLD: 7.8 %
NEUTROPHILS # BLD: 2.3 K/UL (ref 1.7–7.7)
NEUTROPHILS NFR BLD: 62.5 %
PERFORMED ON: ABNORMAL
PLATELET # BLD AUTO: 253 K/UL (ref 135–450)
PMV BLD AUTO: 8 FL (ref 5–10.5)
POTASSIUM SERPL-SCNC: 3.9 MMOL/L (ref 3.5–5.1)
RBC # BLD AUTO: 3.01 M/UL (ref 4–5.2)
SODIUM SERPL-SCNC: 143 MMOL/L (ref 136–145)
WBC # BLD AUTO: 3.8 K/UL (ref 4–11)

## 2025-01-16 PROCEDURE — 6360000002 HC RX W HCPCS: Performed by: NURSE PRACTITIONER

## 2025-01-16 PROCEDURE — 99232 SBSQ HOSP IP/OBS MODERATE 35: CPT | Performed by: NURSE PRACTITIONER

## 2025-01-16 PROCEDURE — 2500000003 HC RX 250 WO HCPCS: Performed by: HOSPITALIST

## 2025-01-16 PROCEDURE — 6370000000 HC RX 637 (ALT 250 FOR IP): Performed by: NURSE PRACTITIONER

## 2025-01-16 PROCEDURE — 6370000000 HC RX 637 (ALT 250 FOR IP): Performed by: HOSPITALIST

## 2025-01-16 PROCEDURE — 36415 COLL VENOUS BLD VENIPUNCTURE: CPT

## 2025-01-16 PROCEDURE — 80048 BASIC METABOLIC PNL TOTAL CA: CPT

## 2025-01-16 PROCEDURE — 94760 N-INVAS EAR/PLS OXIMETRY 1: CPT

## 2025-01-16 PROCEDURE — 94640 AIRWAY INHALATION TREATMENT: CPT

## 2025-01-16 PROCEDURE — 97530 THERAPEUTIC ACTIVITIES: CPT

## 2025-01-16 PROCEDURE — 85025 COMPLETE CBC W/AUTO DIFF WBC: CPT

## 2025-01-16 PROCEDURE — 6370000000 HC RX 637 (ALT 250 FOR IP)

## 2025-01-16 PROCEDURE — 6360000002 HC RX W HCPCS

## 2025-01-16 PROCEDURE — 97165 OT EVAL LOW COMPLEX 30 MIN: CPT

## 2025-01-16 PROCEDURE — 9990000010 HC NO CHARGE VISIT

## 2025-01-16 RX ORDER — OXYCODONE HYDROCHLORIDE 5 MG/1
5 TABLET ORAL EVERY 6 HOURS PRN
Qty: 12 TABLET | Refills: 0 | Status: SHIPPED | OUTPATIENT
Start: 2025-01-16 | End: 2025-01-16

## 2025-01-16 RX ORDER — OXYCODONE HYDROCHLORIDE 5 MG/1
5 TABLET ORAL EVERY 6 HOURS PRN
Qty: 12 TABLET | Refills: 0 | Status: SHIPPED | OUTPATIENT
Start: 2025-01-16 | End: 2025-01-19

## 2025-01-16 RX ORDER — FUROSEMIDE 40 MG/1
40 TABLET ORAL DAILY PRN
Qty: 30 TABLET | Refills: 3 | Status: SHIPPED | OUTPATIENT
Start: 2025-01-16

## 2025-01-16 RX ORDER — LABETALOL HYDROCHLORIDE 5 MG/ML
10 INJECTION, SOLUTION INTRAVENOUS ONCE
Status: COMPLETED | OUTPATIENT
Start: 2025-01-16 | End: 2025-01-16

## 2025-01-16 RX ORDER — HYDRALAZINE HYDROCHLORIDE 25 MG/1
25 TABLET, FILM COATED ORAL 3 TIMES DAILY
Qty: 90 TABLET | Refills: 3 | Status: SHIPPED | OUTPATIENT
Start: 2025-01-16

## 2025-01-16 RX ORDER — INSULIN GLARGINE 100 [IU]/ML
10 INJECTION, SOLUTION SUBCUTANEOUS NIGHTLY
Qty: 10 ML | Refills: 0 | Status: SHIPPED | OUTPATIENT
Start: 2025-01-16 | End: 2025-02-15

## 2025-01-16 RX ORDER — FUROSEMIDE 40 MG/1
40 TABLET ORAL DAILY PRN
Qty: 30 TABLET | Refills: 3 | Status: SHIPPED | OUTPATIENT
Start: 2025-01-16 | End: 2025-01-16

## 2025-01-16 RX ADMIN — MONTELUKAST 10 MG: 10 TABLET, FILM COATED ORAL at 09:49

## 2025-01-16 RX ADMIN — LAMOTRIGINE 25 MG: 25 TABLET ORAL at 09:49

## 2025-01-16 RX ADMIN — ASPIRIN 81 MG: 81 TABLET, CHEWABLE ORAL at 09:49

## 2025-01-16 RX ADMIN — MORPHINE SULFATE 2 MG: 2 INJECTION, SOLUTION INTRAMUSCULAR; INTRAVENOUS at 09:56

## 2025-01-16 RX ADMIN — TICAGRELOR 90 MG: 90 TABLET ORAL at 09:49

## 2025-01-16 RX ADMIN — METOPROLOL SUCCINATE 50 MG: 50 TABLET, EXTENDED RELEASE ORAL at 09:49

## 2025-01-16 RX ADMIN — LOSARTAN POTASSIUM 100 MG: 100 TABLET, FILM COATED ORAL at 09:49

## 2025-01-16 RX ADMIN — AMLODIPINE BESYLATE 10 MG: 10 TABLET ORAL at 09:49

## 2025-01-16 RX ADMIN — HYDRALAZINE HYDROCHLORIDE 100 MG: 50 TABLET ORAL at 16:33

## 2025-01-16 RX ADMIN — LABETALOL HYDROCHLORIDE 10 MG: 5 INJECTION INTRAVENOUS at 01:26

## 2025-01-16 RX ADMIN — PANTOPRAZOLE SODIUM 40 MG: 40 TABLET, DELAYED RELEASE ORAL at 09:49

## 2025-01-16 RX ADMIN — SODIUM CHLORIDE, PRESERVATIVE FREE 10 ML: 5 INJECTION INTRAVENOUS at 09:50

## 2025-01-16 RX ADMIN — ISOSORBIDE MONONITRATE 60 MG: 60 TABLET, EXTENDED RELEASE ORAL at 09:49

## 2025-01-16 RX ADMIN — EMPAGLIFLOZIN 10 MG: 10 TABLET, FILM COATED ORAL at 09:49

## 2025-01-16 RX ADMIN — LEVETIRACETAM 500 MG: 500 TABLET, FILM COATED ORAL at 09:49

## 2025-01-16 RX ADMIN — BUDESONIDE AND FORMOTEROL FUMARATE DIHYDRATE 2 PUFF: 160; 4.5 AEROSOL RESPIRATORY (INHALATION) at 07:57

## 2025-01-16 RX ADMIN — HYDRALAZINE HYDROCHLORIDE 100 MG: 50 TABLET ORAL at 09:49

## 2025-01-16 ASSESSMENT — PAIN DESCRIPTION - DESCRIPTORS
DESCRIPTORS: HEAVINESS
DESCRIPTORS: HEAVINESS

## 2025-01-16 ASSESSMENT — PAIN DESCRIPTION - ORIENTATION
ORIENTATION: LEFT;MID
ORIENTATION: LEFT;MID

## 2025-01-16 ASSESSMENT — PAIN DESCRIPTION - PAIN TYPE
TYPE: ACUTE PAIN
TYPE: ACUTE PAIN

## 2025-01-16 ASSESSMENT — PAIN SCALES - GENERAL
PAINLEVEL_OUTOF10: 8
PAINLEVEL_OUTOF10: 5

## 2025-01-16 ASSESSMENT — PAIN DESCRIPTION - FREQUENCY
FREQUENCY: INTERMITTENT
FREQUENCY: INTERMITTENT

## 2025-01-16 ASSESSMENT — PAIN DESCRIPTION - ONSET
ONSET: ON-GOING
ONSET: ON-GOING

## 2025-01-16 ASSESSMENT — PAIN DESCRIPTION - LOCATION
LOCATION: CHEST
LOCATION: CHEST

## 2025-01-16 ASSESSMENT — ENCOUNTER SYMPTOMS: SHORTNESS OF BREATH: 0

## 2025-01-16 NOTE — NURSE NAVIGATOR
Discharge order noted. There is a follow up appointment scheduled with [facility] Cardiology. WESTON Brady CNP 1/21/25 @7382. There are CHF instructions included on RONNIE/AVS.   Dc wt 106lbs standing

## 2025-01-16 NOTE — CONSULTS
PALLIATIVE MEDICINE CONSULTATION     Patient name:Maren Meza   MRN:[id number]    :1955  Room/Bed:F4U-7319/3115-01   LOS: 2 days         Date of consult:2025    Inpatient consult to Palliative Care  Consult performed by: Jocelyne Alarcon APRN - CNP  Consult ordered by: Oly Carter MD  Reason for consult: 30 day heart failure readmission.        ASSESSMENT/RECOMMENDATIONS     69 y.o. female with chest pain found to be in exacerbation of diastolic heart failure      Symptom Management:  Acute on chronic exacerbation of diastolic heart failure -grade 2 dysfunction.  Cardiology following, signed off.  Plans for furosemide, 40 mg, as needed.  Discussed the aspect of monitoring daily weights, call provider with weight increase of 3 pounds in 1 day or 5 pounds in 1 week.  Hypertensive urgency -improved.  Per patient's report, she monitors daily at home.  Chronic migraines -patient has plans regarding imaging, following with a new neurologist at [facility].  Reviewed several CTs as well as MRI.  Remote history of infarct.  Encouraged her to continue to follow with neurology.  Chronic kidney disease -mild increase, follows with outpatient nephrology.  Encouraged to maintain compliance with medical follow-up.  Goals of Care - See below.     Patient/Family Goals of Care :    Goals of care conversation with patient at bedside.  She has an active HCPOA on file naming her daughter, Neida, primary healthcare decision-maker.  She does not have a living well.  She would like to continue hospitalizations for acute needs, open to additional aggressive intervention if necessary. We did discuss resuscitative measures at length including aspects of intubation, ventilation, CPR, and medications in the event of cardiac or pulmonary arrest.  Patient reports she is attempted conversations with her children, however they state \"we don't want to hear it\".  Patient is unsure about the desire for CPR.  She is

## 2025-01-16 NOTE — PROGRESS NOTES
(N/A, 7/2/2024).           Assessment  Assessment: Pt is a 70 yo F admitted with NSTEMI after presenting with left sided chest pain. PMH including: CHF, Afib, HLD, depression, COPD, HTN, fibromyalgia, DM2, ischemic cardiomyopathy. PTA - pt lives alone in apt with elevator access, typically IND ADLs and mobility/txs with occasional use of rollator PRN. HHA to assist with IADLs 5x/week. Today - pt appears to be at/near baseline function, completing bed mobility IND, functional txs IND and amb without device household distances in room/hallway IND, overall steady gait, no LOB. IND UB dressing and footwear. Anticipate pt mod I full ADL completion based on observed strength, balance, activity tolerance. BUE strength WFL. Pt denies concerns regarding functional performance at this time, states plan to return home with assist PRN and resumed HHA assist. No further acute OT needs identified. Anticipate D/C home with assist PRN, resumed HHA. Will sign off.  Prognosis: Good  Decision Making: Low Complexity  Exam: see above  No Skilled OT: Independent with functional mobility;Independent with ADL's;At baseline function;Safe to return home;No OT goals identified  REQUIRES OT FOLLOW-UP: No  Activity Tolerance  Activity Tolerance: Patient Tolerated treatment well     Plan  Occupational Therapy Plan  Times Per Week: No further acute OT needs identified at this time, pt at/near baseline function.    Restrictions  Restrictions/Precautions  Activity Level: Up with Assist  Position Activity Restriction  Other Position/Activity Restrictions: SBA from nursing for OOB for safety.    Subjective  General  Chart Reviewed: Yes, Orders, Previous Admission  Patient assessed for rehabilitation services?: Yes  Additional Pertinent Hx: Pt is a 70 yo F admitted with NSTEMI after presenting with left sided chest pain. PMH including: CHF, Afib, HLD, depression, COPD, HTN, fibromyalgia, DM2, ischemic cardiomyopathy.  Family / Caregiver Present:  No  Referring Practitioner: Abbi Moreno PA-C  Diagnosis: NSTEMI  Subjective  Subjective: Pt met b/s for OT eval/tx session, in bed, agreeable to therapy. Pleasant. Pt reporting plan to discharge home today.  General Comment  Comments: OK to see per RN       Social/Functional History  Social/Functional History  Lives With: Alone (son lives nearby and can assist PRN)  Type of Home: Apartment  Home Layout: One level, Able to Live on Main level with bedroom/bathroom  Home Access: Elevator  Bathroom Shower/Tub: Walk-in shower  Bathroom Toilet: Handicap height  Bathroom Equipment: Grab bars in shower, Shower chair, Grab bars around toilet  Bathroom Accessibility: Walker accessible  Home Equipment: Cane, Rollator  Receives Help From:  (HHA 5x/week)  Prior Level of Assist for ADLs: Independent  Prior Level of Assist for Homemaking:  (HHA assists 5 hrs/day, 5 days per weeks)  Prior Level of Assist for Ambulation: Independent household ambulator, with or without device, Independent community ambulator, with or without device (rollator PRN if fatigued.)  Prior Level of Assist for Transfers: Independent  Active : No  Mode of Transportation:  (grandson or medical transport from [facility])    Objective        Safety Devices  Type of Devices: Left in bed;Nurse notified          AROM: Within functional limits  Strength: Within functional limits  Coordination: Within functional limits  Tone: Normal    ADL  UE Dressing: Independent  UE Dressing Skilled Clinical Factors: Pt doffed gown/donned shirt seated EOB IND  Putting On/Taking Off Footwear: Independent  Putting On/Taking Off Footwear Skilled Clinical Factors: Doffed footies/donned socks and shoes paris seated EOB IND  Toileting Skilled Clinical Factors: declining need  Functional Mobility: Independent  Functional Mobility Skilled Clinical Factors: Pt amb without device household distances in room/hallway IND, overall steady gait, no LOB. Denied dizziness

## 2025-01-16 NOTE — PROGRESS NOTES
Data- discharge order received, pt verbalized agreement to discharge, disposition to previous residence.     Action- discharge instructions prepared and given to patient, pt verbalized understanding. Medication information packet given r/t NEW and/or CHANGED prescriptions emphasizing name/purpose/side effects, pt verbalized understanding. Discharge instruction summary: Diet- CC/low fat/low chol/high fiber/LINDA with 1500 mL fluid restriction, Activity- UAL, Primary Care Physician as follows: Marin Cardenas -[phone] f/u appointment in one week, immunizations reviewed and are up to date, [facility]. CHF Education reviewed. Pt/ Family has had a total of 60 minutes CHF education this admission encounter.     Response- Pt belongings gathered, IV removed. Disposition is home. Pt walked independently from unit with belongings, no complications.

## 2025-01-16 NOTE — PLAN OF CARE
Problem: Chronic Conditions and Co-morbidities  Goal: Patient's chronic conditions and co-morbidity symptoms are monitored and maintained or improved  Outcome: Progressing  Flowsheets (Taken 1/16/2025 0229)  Care Plan - Patient's Chronic Conditions and Co-Morbidity Symptoms are Monitored and Maintained or Improved:   Monitor and assess patient's chronic conditions and comorbid symptoms for stability, deterioration, or improvement   Collaborate with multidisciplinary team to address chronic and comorbid conditions and prevent exacerbation or deterioration   Update acute care plan with appropriate goals if chronic or comorbid symptoms are exacerbated and prevent overall improvement and discharge     Problem: Discharge Planning  Goal: Discharge to home or other facility with appropriate resources  1/16/2025 0229 by Ann Allen RN  Outcome: Progressing  Flowsheets (Taken 1/16/2025 0229)  Discharge to home or other facility with appropriate resources:   Identify discharge learning needs (meds, wound care, etc)   Identify barriers to discharge with patient and caregiver   Arrange for needed discharge resources and transportation as appropriate  1/15/2025 1704 by Mae Celeste, RN  Outcome: Progressing     Problem: Pain  Goal: Verbalizes/displays adequate comfort level or baseline comfort level  1/16/2025 0229 by Ann Allen RN  Outcome: Progressing  Flowsheets (Taken 1/16/2025 0229)  Verbalizes/displays adequate comfort level or baseline comfort level:   Administer analgesics based on type and severity of pain and evaluate response   Encourage patient to monitor pain and request assistance   Assess pain using appropriate pain scale   Implement non-pharmacological measures as appropriate and evaluate response  1/15/2025 1704 by Mae Celeste RN  Outcome: Progressing     Problem: Safety - Adult  Goal: Free from fall injury  1/16/2025 0229 by Ann Allen, RN  Outcome: Progressing  Flowsheets  (Taken 1/16/2025 0229)  Free From Fall Injury: Instruct family/caregiver on patient safety  1/15/2025 1704 by Mae Celeste RN  Outcome: Progressing     Problem: ABCDS Injury Assessment  Goal: Absence of physical injury  Outcome: Progressing  Flowsheets (Taken 1/16/2025 0229)  Absence of Physical Injury: Implement safety measures based on patient assessment     Problem: Skin/Tissue Integrity  Goal: Absence of new skin breakdown  Description: 1.  Monitor for areas of redness and/or skin breakdown  2.  Assess vascular access sites hourly  3.  Every 4-6 hours minimum:  Change oxygen saturation probe site  4.  Every 4-6 hours:  If on nasal continuous positive airway pressure, respiratory therapy assess nares and determine need for appliance change or resting period.  Outcome: Progressing  Note:   Will monitor skin and mucous members.  Will turn patient every 2 hours, monitor for friction and sheering, and change dressings as needed.  Will preform skin assessment every shift.

## 2025-01-16 NOTE — PROGRESS NOTES
Pt resting in bed this a.m. She c/o mid/left-sided chest pain, described as heaviness, rated at 8/10. PRN Morphine administered. She denied having any current nausea or SOB. She does report chronic numbness and tingling to all extremities. She is SR on telemetry. CHF education provided. Fall precautions in place, belongings within reach. Continue care.

## 2025-01-16 NOTE — PROGRESS NOTES
Comprehensive Nutrition Assessment    Type and Reason for Visit:       Nutrition Recommendations/Plan:   Continue 5 carb choices (75 gm/meal); low fat/low chol/high fiber/LINDA; 1500 ml  Glucerna coupons provided     Malnutrition Assessment:  Malnutrition Status:  Mild malnutrition (01/16/25 1405)    Context:  Chronic Illness     Findings of the 6 clinical characteristics of malnutrition:  Energy Intake:  No decrease in energy intake  Weight Loss:  5% over 1 month     Body Fat Loss:  Mild body fat loss Orbital   Muscle Mass Loss:  Mild muscle mass loss Temples (temporalis)  Fluid Accumulation:  No fluid accumulation     Strength:  Not Performed    Nutrition Assessment:    Per progress notes patient presented to the ED with complaints of chest pain and SOB. Pmh of CHF, CAD, T2DM, COPD, CKD stage 3, HTN, and IBS. Pallative care have been discussed with patient. Patient is on a 5 carb choices (75 gm/meal); low fat/low chol/high fiber/LINDA and 1500ml. During assessment patient reports she had breakfast and ate some of it. Patient had lunch tray in room and ate about 25% of it. Patient states she does not like the food served at the hospital. Reports prior to admission appetite was \"okay\" and eats 2 meals per day.  Diet consist of oatmeal, sausage, pressley, or cereal and fruit for breakfast. For dinner corn bread, beans with ham. Drinks soda and water. Patient states she is losing weight but does not know why. She is not trying to loss weight but instead wants to gain weight. Pt usual body weight was 120lbs, patient cant recall when she was that weight. Per weight history patient ws 121lbs on 03/31/2024. From 12/17/2024-01/16/2024 patient lost 5% of body weight in 1 month. Patient is discharging today. Coupons were provided. Patient was receptive.    Nutrition Related Findings:    Labs reported (BUN 30 H), (Creatinine 1.9 H), (Glucose 116 H). Oriented x 4. No edema. BM on 01/13/2025 (patient has IBS) Wound Type: None

## 2025-01-16 NOTE — DISCHARGE INSTRUCTIONS
Lasix was added by cardiology for as needed swelling or bloating.  Keep your [facility] cardiology appointment on Tuesday.  Follow-up with Dr. Ramires your nephrologist for a follow-up visit to repeat your labs and monitor your kidney function.  Follow-up with your PCP in 1 week for posthospital follow-up.  Follow-up with referrals to pain medicine and call [phone] if you do not hear from them in the next few days.    Extra Heart Failure Education/ Tools/ Resources:     [email or web address].[email or web address]©/[email or web address]   --- this is American Heart Association interactive Healthier Living with Heart Failure guidebook.  Please click hyperlink or copy / paste link into search bar. The QR Code is also available below. Use your mouse to scroll through the pages.  Lots of information about weight monitoring, diet tips, activity, meds, etc    Heart Failure Tools and Resources QR Code is below. It includes multiple resources to include symptom tracker, med tracker, further HF info, and access to a HF Support Network online Community    HF [place]  -- this is a free smart phone forrets available for iPhone and Android download.  Use your phone to track sodium / fluid intake, zone tool symptom tracking, weights, medications, etc. Click on this hyperlink  HF [place]   for QR code for easy download or the link is also found in the below HF Tools and Resources.      DASH (Dietary Approach to Stop Hypertension) diet --  https://www.nhlbi.nih.gov/education/dash-eating-plan -- this diet is a flexible eating plan that promotes heart healthy eating style.  Click on hyperlink or copy / paste link into search bar.  Lots of low sodium recipes and tips.    https://www.NextInput.MiaSolÃ©/recipes  -- more free recipes

## 2025-01-16 NOTE — CARE COORDINATION
Case Management Assessment  Initial Evaluation    Date/Time of Evaluation: 1/16/2025 12:20 PM  Assessment Completed by: MARISSA Sweeney    If patient is discharged prior to next notation, then this note serves as note for discharge by case management.    Patient Name: Maren Meza                   YOB: 1955  Diagnosis: NSTEMI (non-ST elevated myocardial infarction) (HCC) [I21.4]  Chest pain, unspecified type [R07.9]                   Date / Time: 1/14/2025  6:27 PM    Patient Admission Status: Inpatient   Readmission Risk (Low < 19, Mod (19-27), High > 27): Readmission Risk Score: 34.3    Current PCP: Marin Cardenas MD  PCP verified by CM? Yes    Chart Reviewed: Yes      History Provided by: Patient  Patient Orientation: Alert and Oriented    Patient Cognition: Alert    Hospitalization in the last 30 days (Readmission):  Yes    If yes, Readmission Assessment in CM Navigator will be completed.    Advance Directives:      Code Status: Full Code   Patient's Primary Decision Maker is: Named in Scanned ACP Document    Primary Decision Maker: Neida Fisher - Child - [phone]    Secondary Decision Maker: Ester Jenkins - Child - [phone]    Secondary Decision Maker: Nilesh Meza - Child - [phone]    Discharge Planning:    Patient lives with: Alone Type of Home: Apartment  Primary Care Giver: Self  Patient Support Systems include: Children   Current Financial resources: Medicaid  Current community resources: ECF/Home Care  Current services prior to admission: Durable Medical Equipment, Emergency Call  System, Home Care, RYAN/Passport            Current DME: Walker            Type of Home Care services:  Aide Services, PT, Nursing Services    ADLS  Prior functional level: Assistance with the following:, Housework, Shopping  Current functional level: Assistance with the following:, Housework, Shopping    PT AM-PAC:   /24  OT AM-PAC:   /24    Family can provide assistance at DC:

## 2025-01-16 NOTE — CARE COORDINATION
DISCHARGE SUMMARY     DATE OF DISCHARGE: 1/16/25    DISCHARGE DESTINATION: Home       HOME CARE AGENCY: [facility]              PHONE NUMBER:[phone]                       FAX NUMBER: [phone]    DME ORDERED: None

## 2025-01-16 NOTE — PROGRESS NOTES
CLINICAL PHARMACY NOTE: MEDS TO BEDS    Total # of Prescriptions Filled: 2   The following medications were delivered to the patient:  Current Discharge Medication List        START taking these medications    Details   oxyCODONE (ROXICODONE) 5 MG immediate release tablet Take 1 tablet by mouth every 6 hours as needed for Pain for up to 3 days. Intended supply: 3 days. Take lowest dose possible to manage pain Max Daily Amount: 20 mg  Qty: 12 tablet, Refills: 0    Comments: Reduce doses taken as pain becomes manageable  Associated Diagnoses: Chest pain, unspecified type; Low back pain radiating to right leg; Lumbar radiculopathy, right      furosemide (LASIX) 40 MG tablet Take 1 tablet by mouth daily as needed (1 tab daily for 3 lb weight gain, SOB, abd bloating, swelling)  Qty: 30 tablet, Refills: 3               Additional Documentation: Delivered to patient in bed. Instructed not to begin taking new meds until she was home and directed to do so by discharge instructions

## 2025-01-16 NOTE — PLAN OF CARE
Problem: Chronic Conditions and Co-morbidities  Goal: Patient's chronic conditions and co-morbidity symptoms are monitored and maintained or improved  1/16/2025 0755 by Mae Ramirez RN  Outcome: Progressing  Flowsheets (Taken 1/16/2025 0755)  Care Plan - Patient's Chronic Conditions and Co-Morbidity Symptoms are Monitored and Maintained or Improved:   Monitor and assess patient's chronic conditions and comorbid symptoms for stability, deterioration, or improvement   Collaborate with multidisciplinary team to address chronic and comorbid conditions and prevent exacerbation or deterioration   Update acute care plan with appropriate goals if chronic or comorbid symptoms are exacerbated and prevent overall improvement and discharge  1/16/2025 0229 by Ann Allen RN  Outcome: Progressing  Flowsheets (Taken 1/16/2025 0229)  Care Plan - Patient's Chronic Conditions and Co-Morbidity Symptoms are Monitored and Maintained or Improved:   Monitor and assess patient's chronic conditions and comorbid symptoms for stability, deterioration, or improvement   Collaborate with multidisciplinary team to address chronic and comorbid conditions and prevent exacerbation or deterioration   Update acute care plan with appropriate goals if chronic or comorbid symptoms are exacerbated and prevent overall improvement and discharge     Problem: Discharge Planning  Goal: Discharge to home or other facility with appropriate resources  1/16/2025 0755 by Mae Ramirez RN  Outcome: Progressing  Flowsheets (Taken 1/16/2025 0755)  Discharge to home or other facility with appropriate resources:   Identify barriers to discharge with patient and caregiver   Identify discharge learning needs (meds, wound care, etc)   Arrange for needed discharge resources and transportation as appropriate  1/16/2025 0229 by Ann Allen RN  Outcome: Progressing  Flowsheets (Taken 1/16/2025 0229)  Discharge to home or other facility with appropriate

## 2025-01-16 NOTE — DISCHARGE INSTR - COC
bloating/swelling, inability to lie flat, intolerance to usual activity, or cough (especially at night). Report these finding even if no increase in weight.  Dehydration:  having difficulty or a decrease in urination, dizziness, worsening fatigue, or new onset/worsening of generalized weakness.     Please continue a LOW SODIUM diet and LIMIT fluid intake to 48 - 64 ounces ( 1.5 - 2 liters) per day.     Call [facility] Cardiology [phone] and Marin Cardenas [phone] and/or [facility] Heart Failure Resource Line @ [phone] with any questions or concerns.   Please continue heart failure education to patient and family/support system.  See After Visit Summary for hospital follow up appointment details.  Consider spiritual care referral for support and/or completion of advance directives [phone].  Consider: Home [facility] telehealth program if patient agreeable and able to participate and palliative care consult for ongoing goals of care, end of life, and/or chronic disease management discussions.  ***   Patient's personal belongings (please select all that are sent with patient):  {CHP DME Belongings:[id number]}    RN SIGNATURE:  {Esignature:[id number]}    CASE MANAGEMENT/SOCIAL WORK SECTION    Inpatient Status Date: 1/14/25    Readmission Risk Assessment Score:  [facility] RISK OF UNPLANNED READMISSION 2.0             33.4 Total Score      Discharging to Facility/ Agency   Name:  [facility]    Address: [address] [place] [address], [address] [place] [address]  Phone: [phone]  Fax: [phone]      Dialysis Facility (if applicable)   Name:  Address:  Dialysis Schedule:  Phone:  Fax:    / signature: Electronically signed by MARISSA Sweeney on 1/16/25 at 11:15 AM EST    PHYSICIAN SECTION    Prognosis: Good    Condition at Discharge: Stable    Rehab Potential (if transferring to Rehab): Good    Recommended Labs or Other Treatments After Discharge: Home care, pt/ot/rn,

## 2025-01-16 NOTE — PROGRESS NOTES
Physical Therapy      Maren Meza  1/16/2025    -chart reviewed  -nursing indicates she is up in room with sba only for safety   -no acute PT needs  -has assist at home     Electronically signed by CHUY CLEARY, PT on 1/16/2025 at 2:57 PM

## 2025-01-16 NOTE — PROGRESS NOTES
Patients BP most recent BP was 200/88 and when retaken it was 188/88. Perfect serve sent to DORIAN Ahmadi regarding additional BP management. Awaiting response. Electronically signed by Ann Allen RN on 1/16/2025 at 12:31 AM

## 2025-01-16 NOTE — PROGRESS NOTES
[facility]   Daily Progress Note      Admit Date:  1/14/2025    CC: chest pain    HPI:   Maren Meza is a 69 y.o. female with PMH significant for CAD/PCI to LAD, RCA, OM1- multiple LHC in past, chronic angina, DM2, HTN, HLP, CKD 3b, anemia, DHF, ICM. Former tobacco abuse.   Hx med non compliance. Follows w [facility]- last OV 5/21/2024.  Adm 6/27-7/4/2024 w GIB>cont on asa brilinta at [place].   Multiple admissions for CP and HF.     Adm to [facility] with chest pain X1 day. Midsternal, constant pressure. Associated with dizziness, SOB and abd bloating. CP with some improvement after NTG SL.   /65 on admission.   Reports she has been compliant with meds and Na/fluid restrictions at home.      Pro BNP 1571  HS trop 36,27.     Given lasix 40mg IV X1 yesterday.   CP resolved this AM.   Says it improved with morphine.   Denies SOB.   Abd bloating improved.   No edema.     Review of Systems:   General: Denies fever, chills  RESP: Denies cough, sputum, wheeze, snoring  CARD: Denies palpitations,  murmur  GI:Denies nausea, vomiting, heartburn, loss of appetite, change in bowels  VASC: Denies claudication, leg cramps  NEURO: Denies numbness, tingling, weakness,change in mood or memory  HEME: Denies abn bruising, bleeding, anemia    Objective:   BP (!) 130/59   Pulse 68   Temp 98.3 °F (36.8 °C) (Oral)   Resp 16   Ht 1.524 m (5')   Wt 48.1 kg (106 lb 0.7 oz)   SpO2 98%   BMI 20.71 kg/m²         Intake/Output Summary (Last 24 hours) at 1/16/2025 0819  Last data filed at 1/15/2025 1807  Gross per 24 hour   Intake 670 ml   Output 500 ml   Net 170 ml     I/O since adm: Neg 85? accuracy    WEIGHT:Admit Weight - Scale: 49.9 kg (110 lb 0.2 oz)         Today  Weight - Scale: 48.1 kg (106 lb 0.7 oz)   DRY WEIGHT:  Wt Readings from Last 3 Encounters:   01/16/25 48.1 kg (106 lb 0.7 oz)   12/17/24 51 kg (112 lb 6.4 oz)   12/16/24 50.4 kg (111 lb 1.8 oz)       Physical Exam:  GEN: Appears well, no acute  women, 2 drinks/day for men)  -regular physical activity  -no smoking  -stress reduction     Hx non compliance with meds, although tells me she has been compliant recently and uses pre filled pill packs     Stable from CV standpoint, cards will sign off.   Needs 1 week FU with [facility]    Electronically signed by CYRUS Rivers CNP on 1/16/2025 at 8:19 AM

## 2025-01-16 NOTE — PROGRESS NOTES
Patient resting in bed quietly this evening, A&Ox4. Vital signs stable. PIV flushed without issue, dressing CDI, normal saline locked. Reports pain 10/10. Pain medication administered per orders (see eMAR). Patient refused to take amitriptyline, states \"It does not help me.\" Provided education on importance of medication, patient reports understanding. All other nightly medication taken whole without complaint. Endorses nausea. Denies vomiting, SOB, lightheaded/dizziness. No additional needs verbalized at this time. Standard safety precautions in place and call light within reach. Will continue to monitor and assess. Electronically signed by Ann Allen RN on 1/15/2025 at 11:35 PM

## 2025-01-16 NOTE — DISCHARGE SUMMARY
Hospital Medicine Discharge Summary    Patient ID: Maren Meza      Patient's PCP: Marin Cardenas MD    Admit Date: 1/14/2025     Discharge Date:   1/16/25    Admitting Physician: Justina Munson Jr., MD     Discharge Physician: Abbi Moreno PA-C         Hospital Course: 69-year-old female with past medical history of CAD s/p multiple stents, heart failure preserved ejection fraction, essential hypertension, type 2 diabetes, seizure disorder, GERD, anxiety who presented to the ED with complaints of left-sided chest pain with no significant improvement with nitroglycerin.  While in the ED, she was noted to be significantly hypertensive to 218/65 but otherwise hemodynamically stable.  Troponin was noted to be mildly elevated and relatively flat.  EKG showed normal sinus rhythm with no ST elevation.  CXR was obtained which also showed no acute cardiopulmonary process.  Cardiology was consulted for recommendations.  Chest pain likely secondary to an acute heart failure exacerbation.  Patient's NT proBNP elevated, she was given 1 dose of IV Lasix with improvement.  Cardiology did not recommend any further invasive workup.  Recommended adding Lasix 40 mg as needed upon discharge.  Patient reported improvement in chest pain with p.o. pain medication, likely musculoskeletal component, patient wanted referral to pain management, which was completed prior to discharge.  Short supply of p.o. pain medication was prescribed after PDMP was reviewed.  Patient was instructed to follow-up with pain management referral.  Also recommend patient follow-up with her PCP in 1 week for posthospital management, also follow-up with her kidney doctor Dr. Barbosa as she has not been seen since 2023.    Acute on chronic heart failure preserved ejection fraction  -Most recent echo showed normal EF, grade 2 diastolic dysfunction.  -Does not appear significantly hypervolemic on exam.  NT-proBNP elevated, mild crackles on  Associated Diagnoses: Coronary artery disease involving native coronary artery of native heart without angina pectoris      fluticasone (FLONASE) 50 MCG/ACT nasal spray 1 spray by Each Nostril route daily  Qty: 16 g, Refills: 0    Associated Diagnoses: Chronic nasal congestion      albuterol (PROVENTIL) (2.5 MG/3ML) 0.083% nebulizer solution TAKE 3 MLS BY NEBULIZATION EVERY 4 HOURS AS NEEDED FOR WHEEZING  Qty: 360 mL, Refills: 5      albuterol sulfate HFA (PROVENTIL;VENTOLIN;PROAIR) 108 (90 Base) MCG/ACT inhaler Inhale 2 puffs into the lungs every 4 hours as needed for Wheezing or Shortness of Breath      hydrOXYzine HCl (ATARAX) 25 MG tablet Take 1 tablet by mouth 3 times daily as needed for Itching      ferrous sulfate (IRON 325) 325 (65 Fe) MG tablet Take 1 tablet by mouth daily (with breakfast)      amitriptyline (ELAVIL) 10 MG tablet Take 4 tablets by mouth nightly      nitroGLYCERIN (NITROSTAT) 0.4 MG SL tablet Place 1 tablet under the tongue every 5 minutes as needed for Chest pain up to max of 3 total doses. If no relief after 1 dose, call 911.  Qty: 25 tablet, Refills: 3    Associated Diagnoses: Coronary artery disease involving native coronary artery of native heart without angina pectoris      montelukast (SINGULAIR) 10 MG tablet TAKE 1 TABLET BY MOUTH DAILY  Qty: 30 tablet, Refills: 1    Associated Diagnoses: Allergic rhinitis due to other allergic trigger, unspecified seasonality      fluticasone-salmeterol (ADVAIR) 500-50 MCG/ACT AEPB diskus inhaler Inhale 1 puff into the lungs in the morning and 1 puff in the evening.  Qty: 60 each, Refills: 3    Associated Diagnoses: COPD with acute bronchitis (HCC)      docusate sodium (COLACE) 100 MG capsule Take 1 capsule by mouth Every Monday, Wednesday, and Friday             Time Spent on discharge is more than 35 min in the examination, evaluation, counseling and review of medications and discharge plan.      Signed:    Abbi Moreno

## 2025-01-17 ENCOUNTER — TELEPHONE (OUTPATIENT)
Dept: PRIMARY CARE CLINIC | Age: 70
End: 2025-01-17

## 2025-01-17 NOTE — TELEPHONE ENCOUNTER
Care Transitions Initial Follow Up Call    Outreach made within 2 business days of discharge: Yes    Patient: Maren Meza Patient : 1955   MRN: [id number]  Reason for Admission: NSTEMI (non-ST elevated myocardial infarction) ([facility])   Discharge Date: 25       Spoke with: Maren Meza     Discharge department/facility: [facility] Patient Contact:  Was patient able to fill all prescriptions: Yes  Was patient instructed to bring all medications to the follow-up visit: Yes  Is patient taking all medications as directed in the discharge summary? Yes  Does patient understand their discharge instructions: Yes  Does patient have questions or concerns that need addressed prior to 7-14 day follow up office visit: no    Additional needs identified to be addressed with provider  No needs identified             Scheduled appointment with PCP within 7-14 days    Follow Up  Future Appointments   Date Time Provider Department Center   2025 10:00 AM Marin Cardenas MD [facility]

## 2025-01-20 ENCOUNTER — FOLLOWUP TELEPHONE ENCOUNTER (OUTPATIENT)
Dept: ADMINISTRATIVE | Age: 70
End: 2025-01-20

## 2025-01-20 DIAGNOSIS — I10 ESSENTIAL HYPERTENSION: ICD-10-CM

## 2025-01-20 NOTE — PROGRESS NOTES
Late entry 1/19/25  Attempted to reach patient for follow up call. Voicemail was malfunctioning, attempted to leave a message with follow up info, and call back number. Unsure if message went through. On license of [facility] should be following as well.

## 2025-01-21 NOTE — TELEPHONE ENCOUNTER
Medication:   Requested Prescriptions     Pending Prescriptions Disp Refills    omeprazole (PRILOSEC) 20 MG delayed release capsule [Pharmacy Med Name: OMEPRAZOLE 20 MG CPDR 20 Capsule] 28 capsule 1     Sig: TAKE 1 CAPSULE BY MOUTH DAILY        Last Filled:      Patient Phone Number: [phone] (home)     Last appt: 12/17/2024   Next appt: 1/24/2025    Last [place]:       3/30/2023     1:54 PM   RX Monitoring   Periodic Controlled Substance Monitoring Possible medication side effects, risk of tolerance/dependence & alternative treatments discussed.;No signs of potential drug abuse or diversion identified.

## 2025-02-06 ENCOUNTER — TELEPHONE (OUTPATIENT)
Dept: PRIMARY CARE CLINIC | Age: 70
End: 2025-02-06

## 2025-03-13 ENCOUNTER — TELEPHONE (OUTPATIENT)
Dept: PRIMARY CARE CLINIC | Age: 70
End: 2025-03-13

## 2025-03-13 NOTE — TELEPHONE ENCOUNTER
[place] tran from [facility] needing medication list on patient pls fax list to [phone]

## 2025-03-24 DIAGNOSIS — I10 ESSENTIAL HYPERTENSION: ICD-10-CM

## 2025-03-25 ENCOUNTER — TRANSCRIBE ORDERS (OUTPATIENT)
Dept: ADMINISTRATIVE | Age: 70
End: 2025-03-25

## 2025-03-25 DIAGNOSIS — R10.32 LEFT LOWER QUADRANT ABDOMINAL PAIN: Primary | ICD-10-CM

## 2025-03-25 RX ORDER — LEVETIRACETAM 500 MG/1
500 TABLET ORAL 2 TIMES DAILY
Qty: 60 TABLET | Refills: 2 | Status: SHIPPED | OUTPATIENT
Start: 2025-03-25

## 2025-03-25 RX ORDER — OMEPRAZOLE 20 MG/1
20 CAPSULE, DELAYED RELEASE ORAL DAILY
Qty: 28 CAPSULE | Refills: 2 | Status: SHIPPED | OUTPATIENT
Start: 2025-03-25

## 2025-04-02 ENCOUNTER — HOSPITAL ENCOUNTER (OUTPATIENT)
Dept: CT IMAGING | Age: 70
Discharge: HOME | End: 2025-04-02
Payer: COMMERCIAL

## 2025-04-02 DIAGNOSIS — R10.32 LEFT LOWER QUADRANT ABDOMINAL PAIN: ICD-10-CM

## 2025-04-02 LAB
PERFORMED ON: ABNORMAL
POC CREATININE: 2.6 MG/DL (ref 0.6–1.2)
POC SAMPLE TYPE: ABNORMAL

## 2025-04-02 PROCEDURE — 6360000004 HC RX CONTRAST MEDICATION: Performed by: NURSE PRACTITIONER

## 2025-04-02 PROCEDURE — 74176 CT ABD & PELVIS W/O CONTRAST: CPT

## 2025-04-02 PROCEDURE — 74177 CT ABD & PELVIS W/CONTRAST: CPT

## 2025-04-02 PROCEDURE — 82565 ASSAY OF CREATININE: CPT

## 2025-04-02 RX ORDER — IOPAMIDOL 755 MG/ML
75 INJECTION, SOLUTION INTRAVASCULAR
Status: DISCONTINUED | OUTPATIENT
Start: 2025-04-02 | End: 2025-04-03 | Stop reason: HOSPADM

## 2025-04-02 RX ORDER — IOPAMIDOL 612 MG/ML
50 INJECTION, SOLUTION INTRAVASCULAR
Status: COMPLETED | OUTPATIENT
Start: 2025-04-02 | End: 2025-04-02

## 2025-04-02 RX ADMIN — IOPAMIDOL 50 ML: 612 INJECTION, SOLUTION INTRAVENOUS at 13:30

## 2025-04-02 NOTE — PROGRESS NOTES
Called [facility] and informed answering service of abnormal labs creat 2.6 and that patient had CT Abdomen and pelvis w/o iv  but with oral contrast.

## 2025-04-09 NOTE — PROGRESS NOTES
[facility]  Hypoglycemia Event and Prevention Plan      NAME: Maren Meza  MEDICAL RECORD NUMBER:  [id number]  AGE: 69 y.o.   GENDER: female  : 1955  EPISODE DATE:  2024     Data     Recent Labs     24  0859 24  1209 24  1736 24  2007 24  0826 24  1203   POCGLU 172* 119* 234* 106* 69* 179*        Latest Reference Range & Units 24 06:08   Glucose 70 - 99 mg/dL 58 (L)   (L): Data is abnormally low    HgBA1c:    Lab Results   Component Value Date/Time    LABA1C 10.0 2024 05:39 AM     BUN/Creatinine:    Lab Results   Component Value Date/Time    BUN 42 2024 06:08 AM    CREATININE 2.5 2024 06:08 AM     GFR Estimated Creatinine Clearance: 15 mL/min (A) (based on SCr of 2.5 mg/dL (H)).    FIB-4 Calculation: 2.99 at 2024 12:39 PM  Calculated from:  SGOT/AST: 40 U/L at 2024  6:15 AM  SGPT/ALT: 27 U/L at 2024  6:15 AM  Platelets: 175 K/uL at 2024 12:39 PM  Age: 69 years    Medications  Scheduled Medications:   insulin lispro  2 Units SubCUTAneous TID WC    cefTRIAXone (ROCEPHIN) IV  1,000 mg IntraVENous Q24H    OLANZapine  5 mg IntraMUSCular Once    sodium chloride  250 mL IntraVENous Once    insulin glargine  0.15 Units/kg SubCUTAneous Nightly    insulin lispro  0-4 Units SubCUTAneous 4x Daily AC & HS    heparin (porcine)  5,000 Units SubCUTAneous BID    hydrALAZINE  100 mg Oral TID    amLODIPine  5 mg Oral Daily    sodium chloride flush  5-40 mL IntraVENous 2 times per day    aspirin  81 mg Oral Daily    atorvastatin  80 mg Oral Nightly    amitriptyline  40 mg Oral Nightly    oyster shell calcium w/D  1 tablet Oral Daily with breakfast    DULoxetine  60 mg Oral Daily    ferrous sulfate  325 mg Oral Daily with breakfast    fluticasone  1 spray Each Nostril Daily    budesonide-formoterol  2 puff Inhalation BID RT    isosorbide mononitrate  60 mg Oral BID    levETIRAcetam  500 mg Oral BID    linaclotide  290 mcg  Oral Once per day on     metoprolol succinate  50 mg Oral BID    montelukast  10 mg Oral Daily    pantoprazole  40 mg Oral QAM AC    predniSONE  10 mg Oral Daily    QUEtiapine  100 mg Oral Nightly    ranolazine  1,000 mg Oral BID    ticagrelor  90 mg Oral BID       Diet  Current diet/supplement order: ADULT DIET; Regular; 4 carb choices (60 gm/meal); No Added Salt (3-4 gm); 1500 ml     Recorded PO: PO Meals Eaten (%): 1 - 25% last meal in flowsheets      Action      Met with pt. Pt's hands trembling. Pt in process of eating meal tray. Pt's granddaughter on phone asking to speak with someone regarding her grandmother\" Pt gave verbal permission to speak with her granddaughter Diana. Granddaughter updated that her grandmother- per previous discussion hd a mtg with her [employer] CM about her treatment/care plan at home to help her remember to take her insulin due to omitting dose (per pt consult on 2024). Encouraged family to reach out to [employer] to ask about her treatment/care plan. Per granddaughter \"my grandmothers front door was wide open and there is dog feces all over\". Unable to elaborate on dog feces and the door being left open.     Glucose Target: 140-180 (renal), avoid hypoglycemia    Total Daily Insulin:  2024: 0 units + oral steroid (as of 0937am)  2024: 17 units + oral steroid    Recommendation(s): Monitor intake and glucoses. Decrease prandial (provider decreased Humalog 2 units TID with meals to start today)  (Renal weight based dosin.4 units, Renal Prandial weight based dosin.4 units TID with meals)     DM Needs at D/C:  - Basal insulin (pens)     Safety Concerns:  - Lives alone  - Issues remembering to take medications (insulin) as ordered  - Poor intake due to having one meal delivered to her via [employer]    Treatment (manjinder all that apply): Unknown    Physician Notified of event: Yes, Kayode Bush, DO 2024 at 0806am. Spoke with staff nurse- in  hard copy, drawn during this pregnancy

## 2025-04-15 ENCOUNTER — APPOINTMENT (OUTPATIENT)
Dept: CT IMAGING | Age: 70
DRG: 683 | End: 2025-04-15
Payer: COMMERCIAL

## 2025-04-15 ENCOUNTER — HOSPITAL ENCOUNTER (INPATIENT)
Age: 70
LOS: 2 days | Discharge: HOME | DRG: 683 | End: 2025-04-17
Attending: EMERGENCY MEDICINE | Admitting: INTERNAL MEDICINE
Payer: COMMERCIAL

## 2025-04-15 DIAGNOSIS — R13.10 DYSPHAGIA, UNSPECIFIED TYPE: Primary | ICD-10-CM

## 2025-04-15 DIAGNOSIS — N17.9 AKI (ACUTE KIDNEY INJURY): ICD-10-CM

## 2025-04-15 PROBLEM — N18.9 ACUTE ON CHRONIC RENAL INSUFFICIENCY: Status: ACTIVE | Noted: 2025-04-15

## 2025-04-15 PROBLEM — N28.9 ACUTE ON CHRONIC RENAL INSUFFICIENCY: Status: ACTIVE | Noted: 2025-04-15

## 2025-04-15 LAB
ANION GAP SERPL CALCULATED.3IONS-SCNC: 16 MMOL/L (ref 3–16)
BASOPHILS # BLD: 0 K/UL (ref 0–0.2)
BASOPHILS NFR BLD: 0.1 %
BUN SERPL-MCNC: 40 MG/DL (ref 7–20)
CALCIUM SERPL-MCNC: 9.2 MG/DL (ref 8.3–10.6)
CHLORIDE SERPL-SCNC: 99 MMOL/L (ref 99–110)
CO2 SERPL-SCNC: 15 MMOL/L (ref 21–32)
CREAT SERPL-MCNC: 2.5 MG/DL (ref 0.6–1.2)
DEPRECATED RDW RBC AUTO: 14.6 % (ref 12.4–15.4)
EOSINOPHIL # BLD: 0.1 K/UL (ref 0–0.6)
EOSINOPHIL NFR BLD: 0.8 %
GFR SERPLBLD CREATININE-BSD FMLA CKD-EPI: 20 ML/MIN/{1.73_M2}
GLUCOSE BLD-MCNC: 129 MG/DL (ref 70–99)
GLUCOSE BLD-MCNC: 145 MG/DL (ref 70–99)
GLUCOSE SERPL-MCNC: 173 MG/DL (ref 70–99)
HCT VFR BLD AUTO: 27.6 % (ref 36–48)
HGB BLD-MCNC: 9.1 G/DL (ref 12–16)
LYMPHOCYTES # BLD: 1.2 K/UL (ref 1–5.1)
LYMPHOCYTES NFR BLD: 11.9 %
MCH RBC QN AUTO: 31.7 PG (ref 26–34)
MCHC RBC AUTO-ENTMCNC: 32.9 G/DL (ref 31–36)
MCV RBC AUTO: 96.4 FL (ref 80–100)
MONOCYTES # BLD: 0.7 K/UL (ref 0–1.3)
MONOCYTES NFR BLD: 7.3 %
NEUTROPHILS # BLD: 7.8 K/UL (ref 1.7–7.7)
NEUTROPHILS NFR BLD: 79.9 %
PERFORMED ON: ABNORMAL
PERFORMED ON: ABNORMAL
PLATELET # BLD AUTO: 188 K/UL (ref 135–450)
PMV BLD AUTO: 8.4 FL (ref 5–10.5)
POTASSIUM SERPL-SCNC: 3.6 MMOL/L (ref 3.5–5.1)
RBC # BLD AUTO: 2.86 M/UL (ref 4–5.2)
REPORT: NORMAL
RESP PATH DNA+RNA PNL NPH NAA+NON-PROBE: NORMAL
S PYO AG THROAT QL: NEGATIVE
SODIUM SERPL-SCNC: 130 MMOL/L (ref 136–145)
WBC # BLD AUTO: 9.7 K/UL (ref 4–11)

## 2025-04-15 PROCEDURE — 1200000000 HC SEMI PRIVATE

## 2025-04-15 PROCEDURE — 94640 AIRWAY INHALATION TREATMENT: CPT

## 2025-04-15 PROCEDURE — 6370000000 HC RX 637 (ALT 250 FOR IP): Performed by: INTERNAL MEDICINE

## 2025-04-15 PROCEDURE — 2580000003 HC RX 258

## 2025-04-15 PROCEDURE — 36415 COLL VENOUS BLD VENIPUNCTURE: CPT

## 2025-04-15 PROCEDURE — 99285 EMERGENCY DEPT VISIT HI MDM: CPT

## 2025-04-15 PROCEDURE — 94760 N-INVAS EAR/PLS OXIMETRY 1: CPT

## 2025-04-15 PROCEDURE — 6370000000 HC RX 637 (ALT 250 FOR IP)

## 2025-04-15 PROCEDURE — 85025 COMPLETE CBC W/AUTO DIFF WBC: CPT

## 2025-04-15 PROCEDURE — 2580000003 HC RX 258: Performed by: INTERNAL MEDICINE

## 2025-04-15 PROCEDURE — 6360000002 HC RX W HCPCS: Performed by: INTERNAL MEDICINE

## 2025-04-15 PROCEDURE — 87880 STREP A ASSAY W/OPTIC: CPT

## 2025-04-15 PROCEDURE — 0202U NFCT DS 22 TRGT SARS-COV-2: CPT

## 2025-04-15 PROCEDURE — 80048 BASIC METABOLIC PNL TOTAL CA: CPT

## 2025-04-15 PROCEDURE — 70490 CT SOFT TISSUE NECK W/O DYE: CPT

## 2025-04-15 RX ORDER — TRAMADOL HYDROCHLORIDE 50 MG/1
50 TABLET ORAL EVERY 6 HOURS PRN
COMMUNITY
Start: 2025-03-06

## 2025-04-15 RX ORDER — SODIUM CHLORIDE 0.9 % (FLUSH) 0.9 %
5-40 SYRINGE (ML) INJECTION PRN
Status: DISCONTINUED | OUTPATIENT
Start: 2025-04-15 | End: 2025-04-17 | Stop reason: HOSPADM

## 2025-04-15 RX ORDER — LAMOTRIGINE 25 MG/1
25 TABLET ORAL DAILY
Status: DISCONTINUED | OUTPATIENT
Start: 2025-04-15 | End: 2025-04-17 | Stop reason: HOSPADM

## 2025-04-15 RX ORDER — LEVETIRACETAM 500 MG/1
500 TABLET ORAL 2 TIMES DAILY
Status: DISCONTINUED | OUTPATIENT
Start: 2025-04-15 | End: 2025-04-17 | Stop reason: HOSPADM

## 2025-04-15 RX ORDER — ONDANSETRON 4 MG/1
4 TABLET, ORALLY DISINTEGRATING ORAL EVERY 8 HOURS PRN
Status: DISCONTINUED | OUTPATIENT
Start: 2025-04-15 | End: 2025-04-17 | Stop reason: HOSPADM

## 2025-04-15 RX ORDER — INSULIN GLARGINE 100 [IU]/ML
10 INJECTION, SOLUTION SUBCUTANEOUS NIGHTLY
Status: DISCONTINUED | OUTPATIENT
Start: 2025-04-15 | End: 2025-04-17 | Stop reason: HOSPADM

## 2025-04-15 RX ORDER — TORSEMIDE 10 MG/1
TABLET ORAL
COMMUNITY
Start: 2025-03-24

## 2025-04-15 RX ORDER — ACETAMINOPHEN 650 MG/1
650 SUPPOSITORY RECTAL EVERY 6 HOURS PRN
Status: DISCONTINUED | OUTPATIENT
Start: 2025-04-15 | End: 2025-04-17 | Stop reason: HOSPADM

## 2025-04-15 RX ORDER — VIT C/E/ZN/COPPR/LUTEIN/ZEAXAN 250MG-90MG
5000 CAPSULE ORAL DAILY
COMMUNITY
Start: 2025-03-26

## 2025-04-15 RX ORDER — BUDESONIDE AND FORMOTEROL FUMARATE DIHYDRATE 80; 4.5 UG/1; UG/1
2 AEROSOL RESPIRATORY (INHALATION)
Status: DISCONTINUED | OUTPATIENT
Start: 2025-04-15 | End: 2025-04-17 | Stop reason: HOSPADM

## 2025-04-15 RX ORDER — ATORVASTATIN CALCIUM 80 MG/1
80 TABLET, FILM COATED ORAL NIGHTLY
Status: DISCONTINUED | OUTPATIENT
Start: 2025-04-15 | End: 2025-04-17 | Stop reason: HOSPADM

## 2025-04-15 RX ORDER — NITROGLYCERIN 0.4 MG/1
0.4 TABLET SUBLINGUAL EVERY 5 MIN PRN
Status: DISCONTINUED | OUTPATIENT
Start: 2025-04-15 | End: 2025-04-17 | Stop reason: HOSPADM

## 2025-04-15 RX ORDER — AMLODIPINE BESYLATE 10 MG/1
10 TABLET ORAL DAILY
Status: DISCONTINUED | OUTPATIENT
Start: 2025-04-15 | End: 2025-04-17 | Stop reason: HOSPADM

## 2025-04-15 RX ORDER — ISOSORBIDE MONONITRATE 60 MG/1
60 TABLET, EXTENDED RELEASE ORAL 2 TIMES DAILY
Status: DISCONTINUED | OUTPATIENT
Start: 2025-04-15 | End: 2025-04-17 | Stop reason: HOSPADM

## 2025-04-15 RX ORDER — METOPROLOL SUCCINATE 50 MG/1
50 TABLET, EXTENDED RELEASE ORAL 2 TIMES DAILY
Status: DISCONTINUED | OUTPATIENT
Start: 2025-04-15 | End: 2025-04-17 | Stop reason: HOSPADM

## 2025-04-15 RX ORDER — ALBUTEROL SULFATE 90 UG/1
2 INHALANT RESPIRATORY (INHALATION) EVERY 4 HOURS PRN
Status: DISCONTINUED | OUTPATIENT
Start: 2025-04-15 | End: 2025-04-17 | Stop reason: HOSPADM

## 2025-04-15 RX ORDER — MONTELUKAST SODIUM 10 MG/1
10 TABLET ORAL DAILY
Status: DISCONTINUED | OUTPATIENT
Start: 2025-04-15 | End: 2025-04-17 | Stop reason: HOSPADM

## 2025-04-15 RX ORDER — LIDOCAINE HYDROCHLORIDE 20 MG/ML
15 SOLUTION OROPHARYNGEAL ONCE
Status: COMPLETED | OUTPATIENT
Start: 2025-04-15 | End: 2025-04-15

## 2025-04-15 RX ORDER — INSULIN LISPRO 100 [IU]/ML
0-4 INJECTION, SOLUTION INTRAVENOUS; SUBCUTANEOUS
Status: DISCONTINUED | OUTPATIENT
Start: 2025-04-15 | End: 2025-04-17 | Stop reason: HOSPADM

## 2025-04-15 RX ORDER — SODIUM CHLORIDE 0.9 % (FLUSH) 0.9 %
5-40 SYRINGE (ML) INJECTION EVERY 12 HOURS SCHEDULED
Status: DISCONTINUED | OUTPATIENT
Start: 2025-04-15 | End: 2025-04-17 | Stop reason: HOSPADM

## 2025-04-15 RX ORDER — ASPIRIN 81 MG/1
81 TABLET ORAL DAILY
Status: DISCONTINUED | OUTPATIENT
Start: 2025-04-15 | End: 2025-04-17 | Stop reason: HOSPADM

## 2025-04-15 RX ORDER — ASPIRIN 81 MG/1
81 TABLET, COATED ORAL DAILY
COMMUNITY
Start: 2025-03-24

## 2025-04-15 RX ORDER — ONDANSETRON 2 MG/ML
4 INJECTION INTRAMUSCULAR; INTRAVENOUS EVERY 6 HOURS PRN
Status: DISCONTINUED | OUTPATIENT
Start: 2025-04-15 | End: 2025-04-17 | Stop reason: HOSPADM

## 2025-04-15 RX ORDER — SODIUM CHLORIDE 9 MG/ML
INJECTION, SOLUTION INTRAVENOUS PRN
Status: DISCONTINUED | OUTPATIENT
Start: 2025-04-15 | End: 2025-04-17 | Stop reason: HOSPADM

## 2025-04-15 RX ORDER — DEXTROSE MONOHYDRATE 100 MG/ML
INJECTION, SOLUTION INTRAVENOUS CONTINUOUS PRN
Status: DISCONTINUED | OUTPATIENT
Start: 2025-04-15 | End: 2025-04-17 | Stop reason: HOSPADM

## 2025-04-15 RX ORDER — PANTOPRAZOLE SODIUM 40 MG/1
40 TABLET, DELAYED RELEASE ORAL
Status: DISCONTINUED | OUTPATIENT
Start: 2025-04-16 | End: 2025-04-17 | Stop reason: HOSPADM

## 2025-04-15 RX ORDER — AMOXICILLIN AND CLAVULANATE POTASSIUM 500; 125 MG/1; MG/1
1 TABLET, FILM COATED ORAL EVERY 12 HOURS SCHEDULED
Status: DISCONTINUED | OUTPATIENT
Start: 2025-04-15 | End: 2025-04-17 | Stop reason: HOSPADM

## 2025-04-15 RX ORDER — GLUCAGON 1 MG/ML
1 KIT INJECTION PRN
Status: DISCONTINUED | OUTPATIENT
Start: 2025-04-15 | End: 2025-04-17 | Stop reason: HOSPADM

## 2025-04-15 RX ORDER — HYDRALAZINE HYDROCHLORIDE 50 MG/1
100 TABLET, FILM COATED ORAL EVERY 8 HOURS SCHEDULED
Status: DISCONTINUED | OUTPATIENT
Start: 2025-04-15 | End: 2025-04-17 | Stop reason: HOSPADM

## 2025-04-15 RX ORDER — SODIUM CHLORIDE 9 MG/ML
INJECTION, SOLUTION INTRAVENOUS CONTINUOUS
Status: ACTIVE | OUTPATIENT
Start: 2025-04-15 | End: 2025-04-16

## 2025-04-15 RX ORDER — FLUTICASONE PROPIONATE 50 MCG
1 SPRAY, SUSPENSION (ML) NASAL DAILY PRN
Status: DISCONTINUED | OUTPATIENT
Start: 2025-04-15 | End: 2025-04-17 | Stop reason: HOSPADM

## 2025-04-15 RX ORDER — IOPAMIDOL 755 MG/ML
75 INJECTION, SOLUTION INTRAVASCULAR
Status: DISCONTINUED | OUTPATIENT
Start: 2025-04-15 | End: 2025-04-17 | Stop reason: HOSPADM

## 2025-04-15 RX ORDER — HYDROCODONE BITARTRATE AND ACETAMINOPHEN 5; 325 MG/1; MG/1
1 TABLET ORAL ONCE
Status: COMPLETED | OUTPATIENT
Start: 2025-04-15 | End: 2025-04-15

## 2025-04-15 RX ORDER — HYDROXYZINE HYDROCHLORIDE 25 MG/1
25 TABLET, FILM COATED ORAL 3 TIMES DAILY PRN
Status: DISCONTINUED | OUTPATIENT
Start: 2025-04-15 | End: 2025-04-17 | Stop reason: HOSPADM

## 2025-04-15 RX ORDER — 0.9 % SODIUM CHLORIDE 0.9 %
1000 INTRAVENOUS SOLUTION INTRAVENOUS ONCE
Status: COMPLETED | OUTPATIENT
Start: 2025-04-15 | End: 2025-04-15

## 2025-04-15 RX ORDER — HYDRALAZINE HYDROCHLORIDE 100 MG/1
100 TABLET, FILM COATED ORAL 3 TIMES DAILY
COMMUNITY
Start: 2025-03-24

## 2025-04-15 RX ORDER — ALBUTEROL SULFATE 0.83 MG/ML
2.5 SOLUTION RESPIRATORY (INHALATION) EVERY 4 HOURS PRN
Status: DISCONTINUED | OUTPATIENT
Start: 2025-04-15 | End: 2025-04-15 | Stop reason: ALTCHOICE

## 2025-04-15 RX ORDER — MEMANTINE HYDROCHLORIDE 5 MG/1
5 TABLET ORAL DAILY
Status: DISCONTINUED | OUTPATIENT
Start: 2025-04-15 | End: 2025-04-17 | Stop reason: HOSPADM

## 2025-04-15 RX ORDER — TRAMADOL HYDROCHLORIDE 50 MG/1
50 TABLET ORAL EVERY 12 HOURS PRN
Status: DISCONTINUED | OUTPATIENT
Start: 2025-04-15 | End: 2025-04-17 | Stop reason: HOSPADM

## 2025-04-15 RX ORDER — POLYETHYLENE GLYCOL 3350 17 G/17G
17 POWDER, FOR SOLUTION ORAL DAILY PRN
Status: DISCONTINUED | OUTPATIENT
Start: 2025-04-15 | End: 2025-04-17 | Stop reason: HOSPADM

## 2025-04-15 RX ORDER — QUETIAPINE FUMARATE 100 MG/1
100 TABLET, FILM COATED ORAL NIGHTLY
Status: DISCONTINUED | OUTPATIENT
Start: 2025-04-15 | End: 2025-04-17 | Stop reason: HOSPADM

## 2025-04-15 RX ORDER — ACETAMINOPHEN 325 MG/1
650 TABLET ORAL EVERY 6 HOURS PRN
Status: DISCONTINUED | OUTPATIENT
Start: 2025-04-15 | End: 2025-04-17 | Stop reason: HOSPADM

## 2025-04-15 RX ORDER — FERROUS SULFATE 325(65) MG
325 TABLET ORAL
Status: DISCONTINUED | OUTPATIENT
Start: 2025-04-16 | End: 2025-04-17 | Stop reason: HOSPADM

## 2025-04-15 RX ORDER — HEPARIN SODIUM 5000 [USP'U]/ML
5000 INJECTION, SOLUTION INTRAVENOUS; SUBCUTANEOUS 2 TIMES DAILY
Status: DISCONTINUED | OUTPATIENT
Start: 2025-04-15 | End: 2025-04-17 | Stop reason: HOSPADM

## 2025-04-15 RX ORDER — AMITRIPTYLINE HYDROCHLORIDE 10 MG/1
40 TABLET ORAL NIGHTLY
Status: DISCONTINUED | OUTPATIENT
Start: 2025-04-15 | End: 2025-04-17 | Stop reason: HOSPADM

## 2025-04-15 RX ADMIN — ISOSORBIDE MONONITRATE 60 MG: 60 TABLET, EXTENDED RELEASE ORAL at 21:34

## 2025-04-15 RX ADMIN — MEMANTINE 5 MG: 5 TABLET ORAL at 18:42

## 2025-04-15 RX ADMIN — AMOXICILLIN AND CLAVULANATE POTASSIUM 1 TABLET: 500; 125 TABLET, FILM COATED ORAL at 21:34

## 2025-04-15 RX ADMIN — QUETIAPINE FUMARATE 100 MG: 100 TABLET ORAL at 21:34

## 2025-04-15 RX ADMIN — TRAMADOL HYDROCHLORIDE 50 MG: 50 TABLET, FILM COATED ORAL at 21:42

## 2025-04-15 RX ADMIN — LIDOCAINE HYDROCHLORIDE 15 ML: 20 SOLUTION ORAL at 14:24

## 2025-04-15 RX ADMIN — BUDESONIDE AND FORMOTEROL FUMARATE DIHYDRATE 2 PUFF: 80; 4.5 AEROSOL RESPIRATORY (INHALATION) at 20:09

## 2025-04-15 RX ADMIN — HYDRALAZINE HYDROCHLORIDE 100 MG: 50 TABLET ORAL at 21:42

## 2025-04-15 RX ADMIN — AMITRIPTYLINE HYDROCHLORIDE 40 MG: 10 TABLET, FILM COATED ORAL at 21:33

## 2025-04-15 RX ADMIN — HEPARIN SODIUM 5000 UNITS: 5000 INJECTION INTRAVENOUS; SUBCUTANEOUS at 21:44

## 2025-04-15 RX ADMIN — HYDROCODONE BITARTRATE AND ACETAMINOPHEN 1 TABLET: 5; 325 TABLET ORAL at 15:55

## 2025-04-15 RX ADMIN — LEVETIRACETAM 500 MG: 500 TABLET, FILM COATED ORAL at 21:34

## 2025-04-15 RX ADMIN — METOPROLOL SUCCINATE 50 MG: 50 TABLET, EXTENDED RELEASE ORAL at 21:34

## 2025-04-15 RX ADMIN — ASPIRIN 81 MG: 81 TABLET, COATED ORAL at 18:42

## 2025-04-15 RX ADMIN — INSULIN GLARGINE 10 UNITS: 100 INJECTION, SOLUTION SUBCUTANEOUS at 21:47

## 2025-04-15 RX ADMIN — SODIUM CHLORIDE: 0.9 INJECTION, SOLUTION INTRAVENOUS at 18:43

## 2025-04-15 RX ADMIN — AMLODIPINE BESYLATE 10 MG: 10 TABLET ORAL at 18:42

## 2025-04-15 RX ADMIN — ATORVASTATIN CALCIUM 80 MG: 80 TABLET, FILM COATED ORAL at 21:34

## 2025-04-15 RX ADMIN — SODIUM CHLORIDE 1000 ML: 9 INJECTION, SOLUTION INTRAVENOUS at 15:40

## 2025-04-15 RX ADMIN — LAMOTRIGINE 25 MG: 25 TABLET ORAL at 18:42

## 2025-04-15 RX ADMIN — TICAGRELOR 90 MG: 90 TABLET ORAL at 21:34

## 2025-04-15 RX ADMIN — MONTELUKAST 10 MG: 10 TABLET, FILM COATED ORAL at 18:42

## 2025-04-15 ASSESSMENT — PAIN SCALES - GENERAL
PAINLEVEL_OUTOF10: 8
PAINLEVEL_OUTOF10: 7
PAINLEVEL_OUTOF10: 10
PAINLEVEL_OUTOF10: 10
PAINLEVEL_OUTOF10: 4
PAINLEVEL_OUTOF10: 8
PAINLEVEL_OUTOF10: 10

## 2025-04-15 ASSESSMENT — PAIN - FUNCTIONAL ASSESSMENT
PAIN_FUNCTIONAL_ASSESSMENT: ACTIVITIES ARE NOT PREVENTED
PAIN_FUNCTIONAL_ASSESSMENT: 0-10

## 2025-04-15 ASSESSMENT — PAIN DESCRIPTION - ORIENTATION: ORIENTATION: INNER

## 2025-04-15 ASSESSMENT — PAIN DESCRIPTION - DESCRIPTORS
DESCRIPTORS: BURNING;SORE
DESCRIPTORS: BURNING;SORE
DESCRIPTORS: ACHING
DESCRIPTORS: BURNING;SORE

## 2025-04-15 ASSESSMENT — PAIN DESCRIPTION - ONSET: ONSET: ON-GOING

## 2025-04-15 ASSESSMENT — PAIN DESCRIPTION - LOCATION
LOCATION: MOUTH;THROAT
LOCATION: THROAT
LOCATION: MOUTH;THROAT
LOCATION: THROAT
LOCATION: MOUTH;THROAT
LOCATION: THROAT;MOUTH

## 2025-04-15 ASSESSMENT — PAIN DESCRIPTION - PAIN TYPE
TYPE: ACUTE PAIN

## 2025-04-15 NOTE — H&P
Hospital Medicine History & Physical      PCP: Binta Beard APRN - NP    Date of Admission: 4/15/2025    Date of Service: Pt seen/examined on 04/15/2025 and Admitted to Inpatient with expected LOS greater than two midnights due to medical therapy.     Chief Complaint: Sore throat      History Of Present Illness:    69 y.o. female who presented to [facility] with sore throat associated with right-sided neck pain happening since Saturday, started on antibiotics Sunday for strep throat apparently with amoxicillin, denies fever positive for chills denies lightheadedness chest pain shortness of breath headache blurry or double vision.  In emergency department found to have increased creatinine, generalized weakness being admitted for further management and treatment.    Past Medical History:          Diagnosis Date    Acid reflux     Anemia     Anxiety and depression     Arthritis     Asthma     Atrial fibrillation (HCC)     CAD (coronary artery disease) 12/3/2012    Cerebral artery occlusion with cerebral infarction ([facility])     TIA\"\"S--right sided weakness & headache    CHF (congestive heart failure) ([facility])     Chronic kidney disease--stage III     40% kidney function    COPD (chronic obstructive pulmonary disease) ([facility])     DM2 (diabetes mellitus, type 2) ([facility])     Dysarthria     Fibromyalgia 6/7/2016    Headache(784.0) 2/19/2013    Hemisensory loss     History of blood transfusion 11/2020    pt denies having transfusion reaction    Hyperlipidemia     Hypertension     IBS (irritable bowel syndrome)     Inferior vena cava occlusion (HCC)     Keratitis     Meningioma (HCC)     MI, old     Neuropathy     Superior vena cava obstruction     Temporal arteritis (HCC) 2/24/2014    Wears glasses        Past Surgical History:          Procedure Laterality Date    ABLATION OF DYSRHYTHMIC FOCUS  1999  and 11/2020    times 2    ARTERY BIOPSY Right 04/23/2014    RIGHT TEMPORAL ARTERY BIOPSY    CAROTID ARTERY SURGERY Left      [facility]  Autologous Progress Note    2022    Sana Bush    :  1972    MRN:  [id number]    Referring MD: Thea Fox MD  [address]      Subjective:  No new issues. Denies fever chills or vomiting. ECOG PS:  (1) Restricted in physically strenuous activity, ambulatory and able to do work of light nature    KPS: 80% Normal activity with effort; some signs or symptoms of disease    Isolation:  None     Medications    Scheduled Meds:   sodium chloride  1,000 mL IntraVENous Once    alteplase (CATHFLO) with sterile water injection  2 mg IntraCATHeter Once     Continuous Infusions:   sodium chloride       PRN Meds:.sodium chloride, potassium chloride, potassium chloride, magnesium sulfate, magnesium hydroxide, medicated lip ointment, oxyCODONE, oxyCODONE, prochlorperazine, prochlorperazine, ondansetron, ondansetron, heparin flush    ROS:  As noted above, otherwise remainder of 10-point ROS negative    Physical Exam:     I&O:  No intake or output data in the 24 hours ending 22 0855      Vital Signs:  /67   Pulse 90   Temp 98.4 °F (36.9 °C) (Oral)   Resp 16   Wt 139 lb 15.9 oz (63.5 kg)   SpO2 100%   BMI 22.60 kg/m²     Weight:    Wt Readings from Last 3 Encounters:   22 139 lb 15.9 oz (63.5 kg)   22 143 lb 8.3 oz (65.1 kg)   22 147 lb 14.9 oz (67.1 kg)       General: Awake, alert and oriented.   HEENT: normocephalic, alopecia, PERRL, no scleral erythema or icterus, Oral mucosa moist and intact, throat clear  NECK: supple without palpable adenopathy  BACK: Straight negative CVAT  SKIN: warm dry and intact without lesions rashes or masses  CHEST: CTA bilaterally without use of accessory muscles  CV: Normal S1 S2, RRR, no MRG  ABD: NT ND normoactive BS, no palpable masses or hepatosplenomegaly  EXTREMITIES: without edema, denies calf tenderness  NEURO: CN II - XII grossly intact  CATHETER: Right Subclavian Trifusion (IR: Dianne, 7/22/22): CDI      Data:   CBC:   Recent Labs     08/31/22  0845 09/01/22  0835   WBC 5.9 5.1   HGB 12.1* 13.5   HCT 35.6* 39.8*   MCV 93.8 94.1   PLT 95* 97*       BMP/Mag:  Recent Labs     08/31/22  0845 09/01/22  0835    135*   K 3.7 3.8    99   CO2 28 30   PHOS 2.6 3.7   BUN 13 13   CREATININE 0.7* 0.8*   MG 1.70*  --        LIVP:   Recent Labs     08/31/22  0845   AST 15   ALT 11   BILIDIR <0.2   BILITOT 0.5   ALKPHOS 62       Uric Acid:    Recent Labs     08/31/22  0845   LABURIC 2.4*       Coags:   Recent Labs     09/01/22  0835   PROTIME 13.5   INR 1.04           PROBLEM LIST:            Multiple Myeloma  HTN  HLD  Positive H. pylori breath test      TREATMENT:            RVd on (4/11/22) x 4 cycles   [id number] & ASCT (8/30/22) w/ 4.84 x 10^6 CD34+ cells/kg     ASSESSMENT AND PLAN:            1. IgG lambda myeloma, ISS stage II: VGPR  - Bone marrow biopsy (3/10/2022): showed 70% cellularity with plasma cells estimated to account for between 30-50%. 46,XY[20]. Myeloma ROBIN panel showed three copies of CCND1 (11q13. 3) in 83% of cells, four copies of CCND1 (11q13. 3) in 5% of cells, three copies of MAF (16q23.2) in 54% of cells, and monosomy 13 in 9% of cells. - PET/CT, 3/09/2022, showed no evidence of hypermetabolism. - Rbz621 & ASCT (8/30/22) w/ 4.84 x 10^6 CD34+ cells/kg     Emend Hypersensitivity 8/29/22 including facial flushing, stomach cramping. D/C'ed and started PO Zyprexa 10 mg once 8/29/22 then 5 mg PO daily for 3 more days      Day + 3     2. ID:  Afebrile, no evidence of infection.    - Cont Acyclovir & Diflucan ppx  - Start Levaquin when [address] < 1.5  - Hx positive H. pylori breath test w/ negative EGD & Colonoscopy Bx (8/18/22)      3. Heme: Anemia & thrombocytopenia d/t recent chemotherapy    - Start daily G-CSF Day + 5 (9/4/22)  - Transfuse for Hgb < 7 and Platelets < 07W  - No transfusion today     4. Metabolic: Mild Hyperglycemia and renal fxn stable.     - Start IVF hydration: 1 L NS each day in OP Infusion  - Replace potassium and magnesium per policy. 5. Nutrition / GI:  Appetite and oral intake is okay  - Start low microbial diet   - Follow closely with dietary  GI: Persistent nausea   - Hx positive H. pylori breath test w/ negative EGD & Colonoscopy Bx (8/18/22)  - Zofran (Schedule 8/31/22) and Compazine (scheduled 9/1/22) for nausea / vomiting   - Consider continuing Zyprexa 5 mg nightly then increasing to 10 mg nightly if nausea persists. 6. Pulmonary: No active issues   - DLCO of 75% of predicted finding on ASCT workup: No obvious etiology for this  - CXR 8/29/22: Negative for acute disease     7. Prevention of SRE:    - PET/CT was without bony disease. Can hold of on a bisphosphonate. 8. Peripheral Neuropathy:   - Cont Gabapentin 300 mg nighty (Consider increasing dose if symptoms persist)      - Disposition: Return to OP Infusion daily for toxicity assessment, labs and MD visit. The patient was seen and examined by Dr. Zane Hester. BRINDA Bryant - AMBER Holder.  Zane Hester, [facility] [address]

## 2025-04-15 NOTE — PLAN OF CARE
Problem: Chronic Conditions and Co-morbidities  Goal: Patient's chronic conditions and co-morbidity symptoms are monitored and maintained or improved  Outcome: Progressing     Problem: Discharge Planning  Goal: Discharge to home or other facility with appropriate resources  Outcome: Progressing  Flowsheets (Taken 4/15/2025 7567)  Discharge to home or other facility with appropriate resources:   Identify barriers to discharge with patient and caregiver   Arrange for needed discharge resources and transportation as appropriate     Problem: Pain  Goal: Verbalizes/displays adequate comfort level or baseline comfort level  Outcome: Progressing

## 2025-04-15 NOTE — ED PROVIDER NOTES
[facility] EMERGENCY DEPARTMENT  EMERGENCY DEPARTMENT ENCOUNTER        Pt Name: Maren Meza  MRN: [id number]  Birthdate 1955  Date of evaluation: 4/15/2025  Provider: Chantal Guzman PA-C  PCP: Marin Cardenas MD  Note Started: 1:17 PM EDT 4/15/25       I have seen and evaluated this patient with my supervising physician Dereck Denny MD.      CHIEF COMPLAINT       Chief Complaint   Patient presents with    Pharyngitis     Pt was seen at a \"[facility]\" on Sunday and dx'd with Strep. She was started on amoxicillin and 3 days of prednisone. The pt is now stating her throat is worse and her mouth, tongue very sore.       HISTORY OF PRESENT ILLNESS: 1 or more Elements     History From: Patient  Limitations to history : None    Maren Meza is a 69 y.o. female who presents to the ED with a chief complaint of worsening sore throat over the last 2 days.  She was seen 2 days ago at the [facility] and diagnosed with strep pharyngitis, prescribed amoxicillin and steroid Dosepak for symptoms.  Since then, patient has reported an increase in sore throat symptoms, specifically on the right side with some radiation into her right neck.  She stopped taking the prednisone, as she reports \"it was making her feel worse\".  Additionally, she thinks that she has thrush, as her tongue has become painful.  She is a diabetic, she also has multiple other comorbidities.  She did have a rheumatology appointment yesterday. Denies SOB, chest pain.  Denies difficulty breathing.  Denies fevers, chills.    Nursing Notes were all reviewed and agreed with or any disagreements were addressed in the HPI.    REVIEW OF SYSTEMS :      Review of Systems    Positives and Pertinent negatives as per HPI.     SURGICAL HISTORY     Past Surgical History:   Procedure Laterality Date    ABLATION OF DYSRHYTHMIC FOCUS  1999  and 11/2020    times 2    ARTERY BIOPSY Right 04/23/2014    RIGHT TEMPORAL ARTERY BIOPSY    CAROTID ARTERY

## 2025-04-15 NOTE — CARE COORDINATION
Advance Care Planning   Healthcare Decision Maker:    Primary Decision Maker: Neida Fisher - Child - [phone]    Secondary Decision Maker: Ester Jenkins - Child - [phone]    Secondary Decision Maker: Nilesh Meza - Child - [phone]      Today we documented Decision Maker(s) consistent with Legal Next of Kin hierarchy.       Electronically signed by Alexandria Millan on 4/15/2025 at 5:14 PM

## 2025-04-15 NOTE — ED PROVIDER NOTES
Emergency Department Attending Provider Note  Location: [facility] EMERGENCY DEPARTMENT  4/15/2025   Note Started: 1:28 PM EDT 4/15/25     THIS IS MY STEPHEN SUPERVISORY AND SHARED VISIT NOTE:    Patient Identification  Maren Meza is a 69 y.o. female      HPI:Maren Meza was evaluated in the Emergency Department for sore throat as been present over the past 4 days.  Patient states she was seen at urgent care 2 days ago and was started on amoxicillin as well as a steroid burst for coverage of strep throat.  Patient states the majority of her sore throat is on the right side.  Patient states that swallowing and talking makes the sore throat worse.  States the pain is 10 out of 10.  Denies any relieving factors.  States that she has been compliant with her antibiotics over the past 2 days as well as her prednisone burst.  Patient states she has been able to tolerate liquids by mouth but has not been drinking much because of the pain.  She denies any posterior neck pain.  No chest pain.  No shortness of breath.  No abdominal pain.  Normal urinary bowel habits.  Although initial history and physical exam information was obtained by STEPHEN/NPP (who also dictated a record of this visit), I personally saw the patient and made/approved the management plan and take responsibility for the patient management.       PHYSICAL EXAM:     CONSTITUTIONAL: AOx4, cooperative with exam, afebrile   HEAD: normocephalic, atraumatic   EYES: PERRL, EOMI, anicteric sclera   ENT: Moist mucous membranes, uvula midline, no unilateral swelling of the posterior pharynx, no swelling of the uvula, no exudate, no drooling or dysphonia   NECK: Supple, symmetric, trachea midline, tender anterior cervical lymphadenopathy bilaterally, no stridor, no meningismus   LUNGS: Bilateral breath sounds, CTAB, no rales/ronchi/wheezes   CARDIOVASCULAR: RRR, normal S1/S2, no m/r/g, 2+ pulses throughout   ABDOMEN: Soft, non-tender, non-distended, +BS

## 2025-04-15 NOTE — CARE COORDINATION
Case Management Assessment  Initial Evaluation    Date/Time of Evaluation: 4/15/2025 5:10 PM  Assessment Completed by: Alexandria Millan    If patient is discharged prior to next notation, then this note serves as note for discharge by case management.    Patient Name: Maren Meza                   YOB: 1955  Diagnosis: Acute on chronic renal insufficiency [N28.9, N18.9]                   Date / Time: 4/15/2025  1:01 PM    Patient Admission Status: Inpatient   Readmission Risk (Low < 19, Mod (19-27), High > 27): Readmission Risk Score: 24.1    Current PCP: Marin Cardenas MD  PCP verified by CM? Yes    Chart Reviewed: Yes      History Provided by: Patient  Patient Orientation: Alert and Oriented    Patient Cognition: Alert    Hospitalization in the last 30 days (Readmission):  No    If yes, Readmission Assessment in CM Navigator will be completed.    Advance Directives:      Code Status: Prior   Patient's Primary Decision Maker is: Legal Next of Kin    Primary Decision Maker: Neida Fisher - Child - [phone]    Secondary Decision Maker: Ester Jenkins - Child - [phone]    Secondary Decision Maker: Nilesh Meza - Child - [phone]    Discharge Planning:    Patient lives with: Alone Type of Home: Apartment  Primary Care Giver: Self  Patient Support Systems include: Family Members   Current Financial resources: Medicare  Current community resources: Other (Comment) (Patient reported having aide at home through [facility])  Current services prior to admission: None (aide through [facility])            Current DME:              Type of Home Care services:  Aide Services ([facility])    ADLS  Prior functional level: Independent in ADLs/IADLs (Patient reported being independent with all activitiews except when her fibromyalgia is acting up. Pt recieves assistance from home aide during flare ups with ADL's)  Current functional level: Independent in ADLs/IADLs (Patient

## 2025-04-16 LAB
ALBUMIN SERPL-MCNC: 3.4 G/DL (ref 3.4–5)
ALBUMIN/GLOB SERPL: 1.1 {RATIO} (ref 1.1–2.2)
ALP SERPL-CCNC: 81 U/L (ref 40–129)
ALT SERPL-CCNC: 41 U/L (ref 10–40)
ANION GAP SERPL CALCULATED.3IONS-SCNC: 14 MMOL/L (ref 3–16)
AST SERPL-CCNC: 49 U/L (ref 15–37)
BASOPHILS # BLD: 0 K/UL (ref 0–0.2)
BASOPHILS NFR BLD: 0.5 %
BILIRUB SERPL-MCNC: 0.4 MG/DL (ref 0–1)
BUN SERPL-MCNC: 37 MG/DL (ref 7–20)
CALCIUM SERPL-MCNC: 8.6 MG/DL (ref 8.3–10.6)
CHLORIDE SERPL-SCNC: 108 MMOL/L (ref 99–110)
CO2 SERPL-SCNC: 14 MMOL/L (ref 21–32)
CREAT SERPL-MCNC: 2.2 MG/DL (ref 0.6–1.2)
DEPRECATED RDW RBC AUTO: 14.6 % (ref 12.4–15.4)
EOSINOPHIL # BLD: 0.1 K/UL (ref 0–0.6)
EOSINOPHIL NFR BLD: 1.3 %
EST. AVERAGE GLUCOSE BLD GHB EST-MCNC: 231.7 MG/DL
GFR SERPLBLD CREATININE-BSD FMLA CKD-EPI: 24 ML/MIN/{1.73_M2}
GLUCOSE BLD-MCNC: 113 MG/DL (ref 70–99)
GLUCOSE BLD-MCNC: 116 MG/DL (ref 70–99)
GLUCOSE BLD-MCNC: 137 MG/DL (ref 70–99)
GLUCOSE BLD-MCNC: 141 MG/DL (ref 70–99)
GLUCOSE SERPL-MCNC: 132 MG/DL (ref 70–99)
HBA1C MFR BLD: 9.7 %
HCT VFR BLD AUTO: 28.4 % (ref 36–48)
HGB BLD-MCNC: 9.2 G/DL (ref 12–16)
LYMPHOCYTES # BLD: 0.9 K/UL (ref 1–5.1)
LYMPHOCYTES NFR BLD: 14.6 %
MCH RBC QN AUTO: 30.7 PG (ref 26–34)
MCHC RBC AUTO-ENTMCNC: 32.4 G/DL (ref 31–36)
MCV RBC AUTO: 94.8 FL (ref 80–100)
MONOCYTES # BLD: 0.6 K/UL (ref 0–1.3)
MONOCYTES NFR BLD: 10.2 %
NEUTROPHILS # BLD: 4.3 K/UL (ref 1.7–7.7)
NEUTROPHILS NFR BLD: 73.4 %
PERFORMED ON: ABNORMAL
PLATELET # BLD AUTO: 155 K/UL (ref 135–450)
PMV BLD AUTO: 8.2 FL (ref 5–10.5)
POTASSIUM SERPL-SCNC: 3.9 MMOL/L (ref 3.5–5.1)
PROT SERPL-MCNC: 6.5 G/DL (ref 6.4–8.2)
RBC # BLD AUTO: 3 M/UL (ref 4–5.2)
SODIUM SERPL-SCNC: 136 MMOL/L (ref 136–145)
WBC # BLD AUTO: 5.9 K/UL (ref 4–11)

## 2025-04-16 PROCEDURE — 6370000000 HC RX 637 (ALT 250 FOR IP): Performed by: INTERNAL MEDICINE

## 2025-04-16 PROCEDURE — 85025 COMPLETE CBC W/AUTO DIFF WBC: CPT

## 2025-04-16 PROCEDURE — 1200000000 HC SEMI PRIVATE

## 2025-04-16 PROCEDURE — 94640 AIRWAY INHALATION TREATMENT: CPT

## 2025-04-16 PROCEDURE — 80053 COMPREHEN METABOLIC PANEL: CPT

## 2025-04-16 PROCEDURE — 6360000002 HC RX W HCPCS: Performed by: INTERNAL MEDICINE

## 2025-04-16 PROCEDURE — 97161 PT EVAL LOW COMPLEX 20 MIN: CPT

## 2025-04-16 PROCEDURE — 97116 GAIT TRAINING THERAPY: CPT

## 2025-04-16 PROCEDURE — 2500000003 HC RX 250 WO HCPCS: Performed by: INTERNAL MEDICINE

## 2025-04-16 PROCEDURE — 36415 COLL VENOUS BLD VENIPUNCTURE: CPT

## 2025-04-16 PROCEDURE — 2580000003 HC RX 258: Performed by: INTERNAL MEDICINE

## 2025-04-16 PROCEDURE — 83036 HEMOGLOBIN GLYCOSYLATED A1C: CPT

## 2025-04-16 PROCEDURE — 97165 OT EVAL LOW COMPLEX 30 MIN: CPT

## 2025-04-16 PROCEDURE — 94760 N-INVAS EAR/PLS OXIMETRY 1: CPT

## 2025-04-16 PROCEDURE — 97535 SELF CARE MNGMENT TRAINING: CPT

## 2025-04-16 RX ADMIN — METOPROLOL SUCCINATE 50 MG: 50 TABLET, EXTENDED RELEASE ORAL at 21:21

## 2025-04-16 RX ADMIN — BUDESONIDE AND FORMOTEROL FUMARATE DIHYDRATE 2 PUFF: 80; 4.5 AEROSOL RESPIRATORY (INHALATION) at 20:23

## 2025-04-16 RX ADMIN — INSULIN GLARGINE 10 UNITS: 100 INJECTION, SOLUTION SUBCUTANEOUS at 21:25

## 2025-04-16 RX ADMIN — PANTOPRAZOLE SODIUM 40 MG: 40 TABLET, DELAYED RELEASE ORAL at 05:18

## 2025-04-16 RX ADMIN — QUETIAPINE FUMARATE 100 MG: 100 TABLET ORAL at 21:22

## 2025-04-16 RX ADMIN — MONTELUKAST 10 MG: 10 TABLET, FILM COATED ORAL at 08:53

## 2025-04-16 RX ADMIN — ATORVASTATIN CALCIUM 80 MG: 80 TABLET, FILM COATED ORAL at 21:22

## 2025-04-16 RX ADMIN — HEPARIN SODIUM 5000 UNITS: 5000 INJECTION INTRAVENOUS; SUBCUTANEOUS at 08:54

## 2025-04-16 RX ADMIN — LEVETIRACETAM 500 MG: 500 TABLET, FILM COATED ORAL at 21:22

## 2025-04-16 RX ADMIN — MEMANTINE 5 MG: 5 TABLET ORAL at 08:53

## 2025-04-16 RX ADMIN — TICAGRELOR 90 MG: 90 TABLET ORAL at 08:53

## 2025-04-16 RX ADMIN — AMOXICILLIN AND CLAVULANATE POTASSIUM 1 TABLET: 500; 125 TABLET, FILM COATED ORAL at 21:22

## 2025-04-16 RX ADMIN — ASPIRIN 81 MG: 81 TABLET, COATED ORAL at 08:53

## 2025-04-16 RX ADMIN — BUDESONIDE AND FORMOTEROL FUMARATE DIHYDRATE 2 PUFF: 80; 4.5 AEROSOL RESPIRATORY (INHALATION) at 08:35

## 2025-04-16 RX ADMIN — FERROUS SULFATE TAB 325 MG (65 MG ELEMENTAL FE) 325 MG: 325 (65 FE) TAB at 08:53

## 2025-04-16 RX ADMIN — SODIUM CHLORIDE: 0.9 INJECTION, SOLUTION INTRAVENOUS at 08:55

## 2025-04-16 RX ADMIN — ACETAMINOPHEN 650 MG: 325 TABLET ORAL at 00:07

## 2025-04-16 RX ADMIN — TICAGRELOR 90 MG: 90 TABLET ORAL at 21:22

## 2025-04-16 RX ADMIN — AMOXICILLIN AND CLAVULANATE POTASSIUM 1 TABLET: 500; 125 TABLET, FILM COATED ORAL at 08:53

## 2025-04-16 RX ADMIN — TRAMADOL HYDROCHLORIDE 50 MG: 50 TABLET, FILM COATED ORAL at 21:21

## 2025-04-16 RX ADMIN — AMITRIPTYLINE HYDROCHLORIDE 40 MG: 10 TABLET, FILM COATED ORAL at 21:21

## 2025-04-16 RX ADMIN — HEPARIN SODIUM 5000 UNITS: 5000 INJECTION INTRAVENOUS; SUBCUTANEOUS at 21:24

## 2025-04-16 RX ADMIN — LEVETIRACETAM 500 MG: 500 TABLET, FILM COATED ORAL at 08:53

## 2025-04-16 RX ADMIN — ISOSORBIDE MONONITRATE 60 MG: 60 TABLET, EXTENDED RELEASE ORAL at 21:21

## 2025-04-16 RX ADMIN — LAMOTRIGINE 25 MG: 25 TABLET ORAL at 08:53

## 2025-04-16 RX ADMIN — SODIUM CHLORIDE, PRESERVATIVE FREE 5 ML: 5 INJECTION INTRAVENOUS at 08:56

## 2025-04-16 RX ADMIN — HYDRALAZINE HYDROCHLORIDE 100 MG: 50 TABLET ORAL at 21:22

## 2025-04-16 RX ADMIN — ISOSORBIDE MONONITRATE 60 MG: 60 TABLET, EXTENDED RELEASE ORAL at 08:53

## 2025-04-16 ASSESSMENT — PAIN SCALES - GENERAL
PAINLEVEL_OUTOF10: 6
PAINLEVEL_OUTOF10: 6
PAINLEVEL_OUTOF10: 7
PAINLEVEL_OUTOF10: 4

## 2025-04-16 ASSESSMENT — PAIN DESCRIPTION - PAIN TYPE: TYPE: ACUTE PAIN

## 2025-04-16 ASSESSMENT — PAIN DESCRIPTION - DESCRIPTORS
DESCRIPTORS: ACHING
DESCRIPTORS: ACHING;PRESSURE

## 2025-04-16 ASSESSMENT — PAIN DESCRIPTION - FREQUENCY: FREQUENCY: INTERMITTENT

## 2025-04-16 ASSESSMENT — PAIN DESCRIPTION - LOCATION
LOCATION: THROAT
LOCATION: FACE;JAW

## 2025-04-16 ASSESSMENT — PAIN - FUNCTIONAL ASSESSMENT: PAIN_FUNCTIONAL_ASSESSMENT: ACTIVITIES ARE NOT PREVENTED

## 2025-04-16 ASSESSMENT — PAIN DESCRIPTION - ORIENTATION
ORIENTATION: RIGHT
ORIENTATION: INNER

## 2025-04-16 ASSESSMENT — PAIN DESCRIPTION - ONSET: ONSET: ON-GOING

## 2025-04-16 NOTE — CARE COORDINATION
Chart review done, pt is here likely another day, for chronic renal insufficiency. Pt plans to return to home with resumption of her waiver services.     Alexis Crespo LMSW, [facility] Social Work Case Management   Phone: [phone]  Fax: [phone]

## 2025-04-16 NOTE — PLAN OF CARE
Problem: Chronic Conditions and Co-morbidities  Goal: Patient's chronic conditions and co-morbidity symptoms are monitored and maintained or improved  4/16/2025 0415 by Ana Chen RN  Outcome: Progressing  4/15/2025 1828 by Ermias Gabriel RN  Outcome: Progressing

## 2025-04-16 NOTE — PLAN OF CARE
Problem: Chronic Conditions and Co-morbidities  Goal: Patient's chronic conditions and co-morbidity symptoms are monitored and maintained or improved  4/16/2025 0931 by Ida Richard RN  Outcome: Progressing  4/16/2025 0415 by Ana Chen RN  Outcome: Progressing     Problem: Discharge Planning  Goal: Discharge to home or other facility with appropriate resources  4/16/2025 0931 by Ida Richard RN  Outcome: Progressing  4/16/2025 0415 by Ana Chen RN  Outcome: Progressing     Problem: Pain  Goal: Verbalizes/displays adequate comfort level or baseline comfort level  4/16/2025 0931 by Ida Richard RN  Outcome: Progressing  4/16/2025 0415 by Ana Chen RN  Outcome: Progressing     Problem: Safety - Adult  Goal: Free from fall injury  4/16/2025 0931 by Ida Richard RN  Outcome: Progressing  4/16/2025 0415 by Ana Chen RN  Outcome: Progressing

## 2025-04-17 VITALS
SYSTOLIC BLOOD PRESSURE: 112 MMHG | RESPIRATION RATE: 16 BRPM | WEIGHT: 106.04 LBS | HEART RATE: 72 BPM | TEMPERATURE: 98.2 F | BODY MASS INDEX: 20.82 KG/M2 | DIASTOLIC BLOOD PRESSURE: 54 MMHG | OXYGEN SATURATION: 99 % | HEIGHT: 60 IN

## 2025-04-17 LAB
ALBUMIN SERPL-MCNC: 3.2 G/DL (ref 3.4–5)
ALBUMIN/GLOB SERPL: 1.1 {RATIO} (ref 1.1–2.2)
ALP SERPL-CCNC: 67 U/L (ref 40–129)
ALT SERPL-CCNC: 36 U/L (ref 10–40)
ANION GAP SERPL CALCULATED.3IONS-SCNC: 13 MMOL/L (ref 3–16)
AST SERPL-CCNC: 41 U/L (ref 15–37)
BILIRUB SERPL-MCNC: <0.2 MG/DL (ref 0–1)
BUN SERPL-MCNC: 31 MG/DL (ref 7–20)
CALCIUM SERPL-MCNC: 8.6 MG/DL (ref 8.3–10.6)
CHLORIDE SERPL-SCNC: 112 MMOL/L (ref 99–110)
CO2 SERPL-SCNC: 14 MMOL/L (ref 21–32)
CREAT SERPL-MCNC: 2 MG/DL (ref 0.6–1.2)
GFR SERPLBLD CREATININE-BSD FMLA CKD-EPI: 27 ML/MIN/{1.73_M2}
GLUCOSE BLD-MCNC: 137 MG/DL (ref 70–99)
GLUCOSE BLD-MCNC: 78 MG/DL (ref 70–99)
GLUCOSE SERPL-MCNC: 129 MG/DL (ref 70–99)
PERFORMED ON: ABNORMAL
PERFORMED ON: NORMAL
POTASSIUM SERPL-SCNC: 3.8 MMOL/L (ref 3.5–5.1)
PROT SERPL-MCNC: 6.1 G/DL (ref 6.4–8.2)
SODIUM SERPL-SCNC: 139 MMOL/L (ref 136–145)

## 2025-04-17 PROCEDURE — 6370000000 HC RX 637 (ALT 250 FOR IP)

## 2025-04-17 PROCEDURE — 6370000000 HC RX 637 (ALT 250 FOR IP): Performed by: INTERNAL MEDICINE

## 2025-04-17 PROCEDURE — 94640 AIRWAY INHALATION TREATMENT: CPT

## 2025-04-17 PROCEDURE — 2500000003 HC RX 250 WO HCPCS: Performed by: INTERNAL MEDICINE

## 2025-04-17 PROCEDURE — 94760 N-INVAS EAR/PLS OXIMETRY 1: CPT

## 2025-04-17 PROCEDURE — 6360000002 HC RX W HCPCS: Performed by: INTERNAL MEDICINE

## 2025-04-17 PROCEDURE — 80053 COMPREHEN METABOLIC PANEL: CPT

## 2025-04-17 PROCEDURE — 36415 COLL VENOUS BLD VENIPUNCTURE: CPT

## 2025-04-17 RX ORDER — SODIUM BICARBONATE 650 MG/1
650 TABLET ORAL 3 TIMES DAILY
Status: DISCONTINUED | OUTPATIENT
Start: 2025-04-17 | End: 2025-04-17 | Stop reason: HOSPADM

## 2025-04-17 RX ORDER — AMOXICILLIN AND CLAVULANATE POTASSIUM 500; 125 MG/1; MG/1
1 TABLET, FILM COATED ORAL EVERY 12 HOURS SCHEDULED
Qty: 12 TABLET | Refills: 0 | Status: SHIPPED | OUTPATIENT
Start: 2025-04-17 | End: 2025-04-23

## 2025-04-17 RX ORDER — SODIUM BICARBONATE 650 MG/1
650 TABLET ORAL 3 TIMES DAILY
Qty: 90 TABLET | Refills: 0 | Status: SHIPPED | OUTPATIENT
Start: 2025-04-17

## 2025-04-17 RX ADMIN — AMOXICILLIN AND CLAVULANATE POTASSIUM 1 TABLET: 500; 125 TABLET, FILM COATED ORAL at 08:41

## 2025-04-17 RX ADMIN — MONTELUKAST 10 MG: 10 TABLET, FILM COATED ORAL at 08:41

## 2025-04-17 RX ADMIN — LAMOTRIGINE 25 MG: 25 TABLET ORAL at 08:41

## 2025-04-17 RX ADMIN — SODIUM CHLORIDE, PRESERVATIVE FREE 10 ML: 5 INJECTION INTRAVENOUS at 08:44

## 2025-04-17 RX ADMIN — PANTOPRAZOLE SODIUM 40 MG: 40 TABLET, DELAYED RELEASE ORAL at 05:09

## 2025-04-17 RX ADMIN — SODIUM BICARBONATE 650 MG: 650 TABLET ORAL at 13:16

## 2025-04-17 RX ADMIN — AMLODIPINE BESYLATE 10 MG: 10 TABLET ORAL at 08:41

## 2025-04-17 RX ADMIN — HEPARIN SODIUM 5000 UNITS: 5000 INJECTION INTRAVENOUS; SUBCUTANEOUS at 08:48

## 2025-04-17 RX ADMIN — FERROUS SULFATE TAB 325 MG (65 MG ELEMENTAL FE) 325 MG: 325 (65 FE) TAB at 08:48

## 2025-04-17 RX ADMIN — HYDRALAZINE HYDROCHLORIDE 100 MG: 50 TABLET ORAL at 05:09

## 2025-04-17 RX ADMIN — TICAGRELOR 90 MG: 90 TABLET ORAL at 08:41

## 2025-04-17 RX ADMIN — LEVETIRACETAM 500 MG: 500 TABLET, FILM COATED ORAL at 08:41

## 2025-04-17 RX ADMIN — ISOSORBIDE MONONITRATE 60 MG: 60 TABLET, EXTENDED RELEASE ORAL at 08:41

## 2025-04-17 RX ADMIN — MEMANTINE 5 MG: 5 TABLET ORAL at 08:41

## 2025-04-17 RX ADMIN — BUDESONIDE AND FORMOTEROL FUMARATE DIHYDRATE 2 PUFF: 80; 4.5 AEROSOL RESPIRATORY (INHALATION) at 09:07

## 2025-04-17 RX ADMIN — METOPROLOL SUCCINATE 50 MG: 50 TABLET, EXTENDED RELEASE ORAL at 08:41

## 2025-04-17 RX ADMIN — ASPIRIN 81 MG: 81 TABLET, COATED ORAL at 08:41

## 2025-04-17 NOTE — CONSULTS
MUCUS 1+ 05/23/2013 01:27 PM    YEAST Rare Yeast 05/14/2012 03:29 PM    BACTERIA None Seen 12/14/2024 05:05 PM    CLARITYU Clear 12/14/2024 05:05 PM    LEUKOCYTESUR Negative 12/14/2024 05:05 PM    UROBILINOGEN 0.2 12/14/2024 05:05 PM    BILIRUBINUR Negative 12/14/2024 05:05 PM    BLOODU Negative 12/14/2024 05:05 PM    GLUCOSEU Negative 12/14/2024 05:05 PM    GLUCOSEU NEGATIVE 05/14/2012 03:29 PM    AMORPHOUS 2+ 08/13/2024 02:01 PM         IMPRESSION/RECOMMENDATIONS:      KOLBY on CKD 3b/4: baseline Cr ~ 1.7-2.0 mg/dL. Follows with Dr. Chung in office (last seen 11/2023). Etiology of KOLBY likely 2/2 volume depletion in the setting of poor oral intake.   - KOLBY resolved and Cr at baseline   - Continue to hold torsemide and losartan upon discharge    - Patient has an appt with Dr. Chung in 9/2025. Will have patient follow up with me in office in 1-2 weeks.     2. Metabolic acidosis: 2/2 impaired renal function and possibly some component 2/2 recent poor nutrition.    - Bicarb 14. AG 14.   - Start bicarb 650 mg TID   - If patient stays in the hospital, will start bicarb gtt    3. Infectious sore throat   - On augmentin     4. Hypertension   - BP stable    - Continue to hold losartan for now   - Continue other antihypertensives     5. CKD-BMD   - Check phosphorus, PTH, and Vit D outpatient     6. Anemia of CKD   - Check anemia labs outpatient     Will discuss with nephrology attending physician, Dr. Hanson.  See attestation for additional recommendations.    Rain Monge, APRN - CNP

## 2025-04-17 NOTE — PLAN OF CARE
Problem: Chronic Conditions and Co-morbidities  Goal: Patient's chronic conditions and co-morbidity symptoms are monitored and maintained or improved  4/16/2025 2252 by Sana Dean RN  Outcome: Progressing  4/16/2025 0931 by Ida Richard RN  Outcome: Progressing     Problem: Discharge Planning  Goal: Discharge to home or other facility with appropriate resources  4/16/2025 2252 by Sana Dean RN  Outcome: Progressing  4/16/2025 0931 by Ida Richard RN  Outcome: Progressing     Problem: Pain  Goal: Verbalizes/displays adequate comfort level or baseline comfort level  4/16/2025 2252 by Sana Dean RN  Outcome: Progressing  4/16/2025 0931 by Ida Richard RN  Outcome: Progressing     Problem: Safety - Adult  Goal: Free from fall injury  4/16/2025 2252 by Sana Dean RN  Outcome: Progressing  4/16/2025 0931 by Ida Richard RN  Outcome: Progressing

## 2025-04-17 NOTE — DISCHARGE SUMMARY
Hospital Medicine Discharge Summary    Patient ID: Maren Meza      Patient's PCP: Binta Beard APRN - NP    Admit Date: 4/15/2025     Discharge Date:   04/17/2025    Admitting Provider: Paul Carney MD     Discharge Provider: Paul Carney MD     Discharge Diagnoses:       Active Hospital Problems    Diagnosis     Acute on chronic renal insufficiency [N28.9, N18.9]        The patient was seen and examined on day of discharge and this discharge summary is in conjunction with any daily progress note from day of discharge.    Hospital Course:     From HPI:\"69 y.o. female who presented to [facility] with sore throat associated with right-sided neck pain happening since Saturday, started on antibiotics Sunday for strep throat apparently with amoxicillin, denies fever positive for chills denies lightheadedness chest pain shortness of breath headache blurry or double vision.  In emergency department found to have increased creatinine, generalized weakness being admitted for further management and treatment. \"      Acute on chronic renal failure, will hold diuretics, likely due to poor p.o. intake, and gentle IV fluid for 24 hours, creatinine improved to 2.0 discussed with nephrology who added bicarb and okay to discharge home  Metabolic acidosis improved anion gap carbon dioxide relatively flat at 14 discussed with nephrology okay to discharge home on bicarb follow-up as outpatient  Sore throat due to infectious process with pharyngitis, Augmentin on discharge advised to seek immediate medical help in case of any worsening  Congestive heart failure no acute exacerbation  Essential hypertension p.o. medications  Peripheral artery disease no acute changes  CAD no chest pain  Anemia chronic no signs of bleeding follow-up as outpatient                  Physical Exam Performed:     BP (!) 130/58   Pulse 68   Temp 98.2 °F (36.8 °C) (Oral)   Resp 16   Ht 1.524 m (5')   Wt 48.1 kg (106 lb 0.7 oz)

## 2025-04-18 NOTE — PROGRESS NOTES
Physician Progress Note      PATIENT:               ARISTEO HAM  [facility] #:                  [id number]  :                       1955  ADMIT DATE:       4/15/2025 1:01 PM  DISCH DATE:        2025 3:05 PM  RESPONDING  PROVIDER #:        Paul Carney MD          QUERY TEXT:    CKD is documented in the H&P on 4/15/25. Please specify the disease stage:    The clinical indicators include:  Hx-GERD, Anemia, Asthma, AF, CAD, CVA, CHF, CKD, COPD, DM2, HTN, HLD, MI,   Superior vena cava obstruction, Temporal arteritis  Clinical indicators- GFR 20, 24......CRT 2.5, 2.2...Per H&P on 4/15/25- Acute   on chronic renal failure,  Tx- Hold diuretics, IVF, Monitor CMP,  Options provided:  -- Chronic kidney disease Stage 1  -- Chronic kidney disease Stage 2  -- Chronic kidney disease Stage 3, unspecified  -- Chronic kidney disease Stage 3a  -- Chronic kidney disease Stage 3b  -- Chronic kidney disease Stage 4  -- Chronic kidney disease Stage 5  -- Chronic kidney disease Stage 5, requiring dialysis  -- End Stage Renal Disease  -- Other - I will add my own diagnosis  -- Disagree - Not applicable / Not valid  -- Disagree - Clinically unable to determine / Unknown  -- Refer to Clinical Documentation Reviewer    PROVIDER RESPONSE TEXT:    The patient has chronic kidney disease Stage 3.    Query created by: Jing Roche on 2025 7:28 AM      QUERY TEXT:    Congestive Heart Failure is documented in the H&P on 4/15/25.  Please document   the type and acuity:    The clinical indicators include:  Hx-GERD, Anemia, Asthma, AF, CAD, CVA, CHF, CKD, COPD, DM2, HTN, HLD, MI,   Superior vena cava obstruction, Temporal arteritis  Clinical indicators- ECHO- 24-  EF of 55 - 60%........Per H&P on 4/15/25-   Congestive heart failure no acute exacerbation  Tx- Monitor  Options provided:  -- Chronic Diastolic CHF/HFpEF  -- Acute Diastolic CHF/HFpEF  -- Acute on Chronic Diastolic CHF/HFpEF  -- Other - I will add my own 
  [facility] - Physical Therapy   Phone: [phone]    Physical Therapy  Unit: [facility] MED SURG    [x] Initial Evaluation            [] Daily Treatment Note         [x] Discharge Summary      Patient: Maren Meza   : 1955   MRN: [id number]   Date of Service:  2025  Staff Mobility Recommendation: Supervision only.     AM-PAC score: 23/24  Discharge Recommendations: Maren Meza scored a 23/24 on the AM-PAC short mobility form. Current research shows that an AM-PAC score of 18 or greater is typically associated with a discharge to the patient's home setting. Based on the patient's AM-PAC score and their current functional mobility deficits, it is recommended that the patient have 2-3 sessions per week of Physical Therapy at d/c to increase the patient's independence.  At this time, this patient demonstrates the endurance and safety to discharge home with Home PT Evaluation  and a follow up treatment frequency of 2-3x/wk.  Please see assessment section for further patient specific details.    If patient discharges prior to next session this note will serve as a discharge summary.  Please see below for the latest assessment towards goals.     Admitting Diagnosis: Acute on chronic renal insufficiency  Ordering Physician: Dr Carney  Current Admission Summary: 68 y/o female admit 4/15/2025 with Acute on Chronic Renal Failure, Sore Throat due to Infectious Process with Pharyngitis.  PMH as noted including CAD, CKD, IBS, Fibromyalgia    Past Medical History:  has a past medical history of Acid reflux, Anemia, Anxiety and depression, Arthritis, Asthma, Atrial fibrillation ([facility]), CAD (coronary artery disease), Cerebral artery occlusion with cerebral infarction ([facility]), CHF (congestive heart failure) ([facility]), Chronic kidney disease--stage III, COPD (chronic obstructive pulmonary disease) ([facility]), DM2 (diabetes mellitus, type 2) ([facility]), Dysarthria, Fibromyalgia, Headache(784.0), Hemisensory loss, History of 
4 Eyes Skin Assessment     NAME:  Maren Meza  YOB: 1955  MEDICAL RECORD NUMBER:  [id number]    The patient is being assessed for  Admission    I agree that at least one RN has performed a thorough Head to Toe Skin Assessment on the patient. ALL assessment sites listed below have been assessed.      Areas assessed by both nurses:    Head, Face, Ears, Shoulders, Back, Chest, Arms, Elbows, Hands, Sacrum. Buttock, Coccyx, Ischium, Legs. Feet and Heels, and Under Medical Devices         Does the Patient have a Wound? No noted wound(s)       Rakan Prevention initiated by RN: Yes  Wound Care Orders initiated by RN: No    Pressure Injury (Stage 3,4, Unstageable, DTI, NWPT, and Complex wounds) if present, place Wound referral order by RN under : No    New Ostomies, if present place, Ostomy referral order under : No     Nurse 1 eSignature: Electronically signed by Ermias Gabriel RN on 4/15/25 at 6:18 PM EDT    **SHARE this note so that the co-signing nurse can place an eSignature**    Nurse 2 eSignature: Electronically signed by Matt Loaiza RN on 4/15/25 at 6:19 PM EDT   
CLINICAL PHARMACY NOTE: MEDS TO BEDS    Total # of Prescriptions Filled: 2   The following medications were delivered to the patient:  Current Discharge Medication List        START taking these medications    Details   amoxicillin-clavulanate (AUGMENTIN) 500-125 MG per tablet Take 1 tablet by mouth every 12 hours for 6 days  Qty: 12 tablet, Refills: 0      sodium bicarbonate 650 MG tablet Take 1 tablet by mouth 3 times daily  Qty: 90 tablet, Refills: 0               Additional Documentation:    
Occupational Therapy    [facility] - Occupational Therapy   Phone: [phone]    Occupational Therapy  Facility/Department:[address] MED SURG    [x] Initial Evaluation            [] Daily Treatment Note         [] Discharge Summary      Patient: Maren Meza   : 1955   MRN: [id number]   Date of Service:  2025    Staff Mobility Recommendation: SBA without device    AM-PAC Score:   Discharge Recommendations: home with assist prn     Admitting Diagnosis:  Acute on chronic renal insufficiency  Ordering Physician: Paul Carney MD  Current Admission Summary: Pt is a 70 yo female admitted with R sided neck pain, found to have pharyngitis.     Past Medical History:  has a past medical history of Acid reflux, Anemia, Anxiety and depression, Arthritis, Asthma, Atrial fibrillation ([facility]), CAD (coronary artery disease), Cerebral artery occlusion with cerebral infarction ([facility]), CHF (congestive heart failure) ([facility]), Chronic kidney disease--stage III, COPD (chronic obstructive pulmonary disease) ([facility]), DM2 (diabetes mellitus, type 2) ([facility]), Dysarthria, Fibromyalgia, Headache(784.0), Hemisensory loss, History of blood transfusion, Hyperlipidemia, Hypertension, IBS (irritable bowel syndrome), Inferior vena cava occlusion (HCC), Keratitis, Meningioma (HCC), MI, old, Neuropathy, Superior vena cava obstruction, Temporal arteritis ([facility]), and Wears glasses.  Past Surgical History:  has a past surgical history that includes Tunneled venous port placement; Cholecystectomy; ablation of dysrhythmic focus (  and 2020); Cataract removal (Bilateral); joint replacement (Right); Hysterectomy; Artery Biopsy (Right, 2014); Coronary angioplasty with stent (); Knee arthroscopy (Right); Percutaneous Transluminal Coronary Angio (10/2019); Upper gastrointestinal endoscopy (N/A, 2020); Carotid artery surgery (Left); Colonoscopy (N/A, 2021); Colonoscopy (N/A, 10/15/2021); Upper gastrointestinal 
Pt Ao x4. Blood pressure 105/50 with a pulse of 60 MD made aware. Pt denies pain when asked. Morning medications given, per MD hold amlodipine and metoprolol tolerated well. Morning assessment completed. Pt turns self but will be reminded as needed. Pt uses a walker, and call appropriately. Pt has no questions or concerns. Standard safety measures in place.     Electronically signed by Ida Richard RN on 4/16/2025 at 9:33 AM    
Pt admitted from [facility] ED to  4280, pt alert and oriented providing history, vitals stable on room air, pt complaining of pain 8/10 in her mouth and throat, Dr. Carney at bedside assessing pt, pt oriented to room and call light, encouraged to call for any needs, Walker provided to the pt for ambulation needs as she mentioned she uses the walker at home as needed. Encouraged to ask for assistance.     Dinner ordered, pt resting in bed at this time, no needs voiced, plan of care progressing.     Electronically signed by Ermias Gabriel RN on 4/15/25 at 6:22 PM EDT   
Pt educated on discharge paperwork. Medications received by pharmacy. IV removed with no complications. Pt wheeled to lobby awaiting daughter to pick her up. No other needs at this time. Electronically signed by WENDY OVIEDO RN on 4/17/2025 at 3:15 PM    
epiglottis, aryepiglottic folds and pyriform sinuses appear unremarkable.  The true and false vocal cords are normal in appearance.  No mass or abscess is seen. SALIVARY GLANDS/THYROID:  The parotid and submandibular glands appear unremarkable.  The thyroid gland appears unremarkable. LYMPH NODES:  No cervical or supraclavicular lymphadenopathy is seen. SOFT TISSUES:  No appreciable soft tissue swelling or mass is seen. BRAIN/ORBITS/SINUSES:  The visualized portion of the intracranial contents appear unremarkable.  The visualized portion of the orbits, paranasal sinuses and mastoid air cells demonstrate no acute abnormality. LUNG APICES/SUPERIOR MEDIASTINUM:  No focal consolidation is seen within the visualized lung apices.  No superior mediastinal lymphadenopathy or mass. The visualized portion of the trachea appears unremarkable. BONES:  No aggressive appearing lytic or blastic bony lesion.     No acute abnormality of the soft tissue structures of the neck.       CBC:   Recent Labs     04/15/25  1355 04/16/25  0528   WBC 9.7 5.9   HGB 9.1* 9.2*    155     BMP:    Recent Labs     04/15/25  1355 04/16/25  0528   * 136   K 3.6 3.9   CL 99 108   CO2 15* 14*   BUN 40* 37*   CREATININE 2.5* 2.2*   GLUCOSE 173* 132*     Hepatic:   Recent Labs     04/16/25  0528   AST 49*   ALT 41*   BILITOT 0.4   ALKPHOS 81     Lipids:   Lab Results   Component Value Date/Time    CHOL 125 12/08/2024 06:15 AM    HDL 47 12/08/2024 06:15 AM    HDL 58 05/14/2012 03:27 PM    TRIG 86 12/08/2024 06:15 AM     Hemoglobin A1C:   Lab Results   Component Value Date/Time    LABA1C 9.7 04/16/2025 05:28 AM     TSH:   Lab Results   Component Value Date/Time    TSH 1.67 12/08/2024 01:41 AM     Troponin: No results found for: \"TROPONINT\"  Lactic Acid: No results for input(s): \"LACTA\" in the last 72 hours.  BNP: No results for input(s): \"PROBNP\" in the last 72 hours.  UA:  Lab Results   Component Value Date/Time    NITRU Negative 12/14/2024

## 2025-04-21 DIAGNOSIS — I25.10 CORONARY ARTERY DISEASE INVOLVING NATIVE CORONARY ARTERY OF NATIVE HEART WITHOUT ANGINA PECTORIS: ICD-10-CM

## 2025-04-22 RX ORDER — ATORVASTATIN CALCIUM 80 MG/1
80 TABLET, FILM COATED ORAL NIGHTLY
Qty: 90 TABLET | Refills: 6 | OUTPATIENT
Start: 2025-04-22

## 2025-05-04 ENCOUNTER — APPOINTMENT (OUTPATIENT)
Dept: GENERAL RADIOLOGY | Age: 70
DRG: 303 | End: 2025-05-04
Payer: COMMERCIAL

## 2025-05-04 ENCOUNTER — HOSPITAL ENCOUNTER (INPATIENT)
Age: 70
LOS: 2 days | Discharge: HOME | DRG: 303 | End: 2025-05-06
Attending: EMERGENCY MEDICINE
Payer: COMMERCIAL

## 2025-05-04 DIAGNOSIS — Z79.4 TYPE 2 DIABETES MELLITUS WITH BOTH EYES AFFECTED BY MILD NONPROLIFERATIVE RETINOPATHY WITHOUT MACULAR EDEMA, WITH LONG-TERM CURRENT USE OF INSULIN (HCC): ICD-10-CM

## 2025-05-04 DIAGNOSIS — R79.89 ELEVATED TROPONIN: ICD-10-CM

## 2025-05-04 DIAGNOSIS — N17.9 ACUTE KIDNEY INJURY SUPERIMPOSED ON CHRONIC KIDNEY DISEASE: ICD-10-CM

## 2025-05-04 DIAGNOSIS — E87.1 HYPONATREMIA: ICD-10-CM

## 2025-05-04 DIAGNOSIS — I25.85 CHRONIC CORONARY MICROVASCULAR DYSFUNCTION: ICD-10-CM

## 2025-05-04 DIAGNOSIS — Z79.4 TYPE 2 DIABETES MELLITUS WITH CHRONIC KIDNEY DISEASE, WITH LONG-TERM CURRENT USE OF INSULIN, UNSPECIFIED CKD STAGE (HCC): ICD-10-CM

## 2025-05-04 DIAGNOSIS — I20.9 ANGINA PECTORIS: ICD-10-CM

## 2025-05-04 DIAGNOSIS — E11.10 DIABETIC KETOACIDOSIS WITHOUT COMA ASSOCIATED WITH TYPE 2 DIABETES MELLITUS (HCC): ICD-10-CM

## 2025-05-04 DIAGNOSIS — E11.22 TYPE 2 DIABETES MELLITUS WITH CHRONIC KIDNEY DISEASE, WITH LONG-TERM CURRENT USE OF INSULIN, UNSPECIFIED CKD STAGE (HCC): ICD-10-CM

## 2025-05-04 DIAGNOSIS — I20.0 UNSTABLE ANGINA (HCC): Primary | ICD-10-CM

## 2025-05-04 DIAGNOSIS — R07.9 CHEST PAIN, UNSPECIFIED TYPE: ICD-10-CM

## 2025-05-04 DIAGNOSIS — N18.9 ACUTE KIDNEY INJURY SUPERIMPOSED ON CHRONIC KIDNEY DISEASE: ICD-10-CM

## 2025-05-04 DIAGNOSIS — E11.3293 TYPE 2 DIABETES MELLITUS WITH BOTH EYES AFFECTED BY MILD NONPROLIFERATIVE RETINOPATHY WITHOUT MACULAR EDEMA, WITH LONG-TERM CURRENT USE OF INSULIN (HCC): ICD-10-CM

## 2025-05-04 LAB
ALBUMIN SERPL-MCNC: 3.9 G/DL (ref 3.4–5)
ALBUMIN/GLOB SERPL: 1.3 {RATIO} (ref 1.1–2.2)
ALP SERPL-CCNC: 88 U/L (ref 40–129)
ALT SERPL-CCNC: 71 U/L (ref 10–40)
ANION GAP SERPL CALCULATED.3IONS-SCNC: 12 MMOL/L (ref 3–16)
ANION GAP SERPL CALCULATED.3IONS-SCNC: 18 MMOL/L (ref 3–16)
ANTI-XA UNFRAC HEPARIN: 0.82 IU/ML (ref 0.3–0.7)
APTT BLD: 26.6 SEC (ref 22.1–36.4)
AST SERPL-CCNC: 37 U/L (ref 15–37)
BASE EXCESS BLDV CALC-SCNC: -12.3 MMOL/L (ref -3–3)
BASOPHILS # BLD: 0 K/UL (ref 0–0.2)
BASOPHILS NFR BLD: 0.1 %
BETA-HYDROXYBUTYRATE: 0.04 MMOL/L (ref 0–0.27)
BETA-HYDROXYBUTYRATE: 0.05 MMOL/L (ref 0–0.27)
BILIRUB SERPL-MCNC: 0.3 MG/DL (ref 0–1)
BILIRUB UR QL STRIP.AUTO: NEGATIVE
BUN SERPL-MCNC: 53 MG/DL (ref 7–20)
BUN SERPL-MCNC: 63 MG/DL (ref 7–20)
CALCIUM SERPL-MCNC: 8.5 MG/DL (ref 8.3–10.6)
CALCIUM SERPL-MCNC: 9 MG/DL (ref 8.3–10.6)
CHLORIDE SERPL-SCNC: 111 MMOL/L (ref 99–110)
CHLORIDE SERPL-SCNC: 98 MMOL/L (ref 99–110)
CLARITY UR: CLEAR
CO2 BLDV-SCNC: 14 MMOL/L
CO2 SERPL-SCNC: 13 MMOL/L (ref 21–32)
CO2 SERPL-SCNC: 14 MMOL/L (ref 21–32)
COLOR UR: YELLOW
CREAT SERPL-MCNC: 2 MG/DL (ref 0.6–1.2)
CREAT SERPL-MCNC: 2.4 MG/DL (ref 0.6–1.2)
DEPRECATED RDW RBC AUTO: 16.2 % (ref 12.4–15.4)
EOSINOPHIL # BLD: 0 K/UL (ref 0–0.6)
EOSINOPHIL NFR BLD: 0 %
GFR SERPLBLD CREATININE-BSD FMLA CKD-EPI: 21 ML/MIN/{1.73_M2}
GFR SERPLBLD CREATININE-BSD FMLA CKD-EPI: 27 ML/MIN/{1.73_M2}
GLUCOSE BLD-MCNC: 130 MG/DL (ref 70–99)
GLUCOSE BLD-MCNC: 152 MG/DL (ref 70–99)
GLUCOSE BLD-MCNC: 160 MG/DL (ref 70–99)
GLUCOSE BLD-MCNC: 184 MG/DL (ref 70–99)
GLUCOSE BLD-MCNC: 186 MG/DL (ref 70–99)
GLUCOSE BLD-MCNC: 201 MG/DL (ref 70–99)
GLUCOSE BLD-MCNC: 233 MG/DL (ref 70–99)
GLUCOSE BLD-MCNC: 369 MG/DL (ref 70–99)
GLUCOSE SERPL-MCNC: 120 MG/DL (ref 70–99)
GLUCOSE SERPL-MCNC: 439 MG/DL (ref 70–99)
GLUCOSE UR STRIP.AUTO-MCNC: >=1000 MG/DL
HCO3 BLDV-SCNC: 14.4 MMOL/L (ref 23–29)
HCT VFR BLD AUTO: 26 % (ref 36–48)
HGB BLD-MCNC: 8.4 G/DL (ref 12–16)
HGB UR QL STRIP.AUTO: NEGATIVE
KETONES UR STRIP.AUTO-MCNC: NEGATIVE MG/DL
LACTATE BLDV-SCNC: 1.3 MMOL/L (ref 0.4–2)
LEUKOCYTE ESTERASE UR QL STRIP.AUTO: NEGATIVE
LYMPHOCYTES # BLD: 0.3 K/UL (ref 1–5.1)
LYMPHOCYTES NFR BLD: 4.5 %
MAGNESIUM SERPL-MCNC: 2.19 MG/DL (ref 1.8–2.4)
MCH RBC QN AUTO: 31.5 PG (ref 26–34)
MCHC RBC AUTO-ENTMCNC: 32.4 G/DL (ref 31–36)
MCV RBC AUTO: 97.4 FL (ref 80–100)
MONOCYTES # BLD: 0.2 K/UL (ref 0–1.3)
MONOCYTES NFR BLD: 3.7 %
NEUTROPHILS # BLD: 6.1 K/UL (ref 1.7–7.7)
NEUTROPHILS NFR BLD: 91.7 %
NITRITE UR QL STRIP.AUTO: NEGATIVE
NT-PROBNP SERPL-MCNC: 3373 PG/ML (ref 0–124)
O2 THERAPY: ABNORMAL
PCO2 BLDV: 30.8 MMHG (ref 40–50)
PERFORMED ON: ABNORMAL
PH BLDV: 7.28 [PH] (ref 7.35–7.45)
PH UR STRIP.AUTO: 5.5 [PH] (ref 5–8)
PHOSPHATE SERPL-MCNC: 3.8 MG/DL (ref 2.5–4.9)
PLATELET # BLD AUTO: 169 K/UL (ref 135–450)
PMV BLD AUTO: 8.7 FL (ref 5–10.5)
PO2 BLDV: 23 MMHG (ref 25–40)
POTASSIUM SERPL-SCNC: 4.3 MMOL/L (ref 3.5–5.1)
POTASSIUM SERPL-SCNC: 4.6 MMOL/L (ref 3.5–5.1)
PROT SERPL-MCNC: 6.9 G/DL (ref 6.4–8.2)
PROT UR STRIP.AUTO-MCNC: NEGATIVE MG/DL
RBC # BLD AUTO: 2.67 M/UL (ref 4–5.2)
RBC #/AREA URNS HPF: ABNORMAL /HPF (ref 0–4)
SAO2 % BLDV: 83 %
SODIUM SERPL-SCNC: 129 MMOL/L (ref 136–145)
SODIUM SERPL-SCNC: 137 MMOL/L (ref 136–145)
SP GR UR STRIP.AUTO: 1.01 (ref 1–1.03)
TROPONIN, HIGH SENSITIVITY: 95 NG/L (ref 0–14)
TROPONIN, HIGH SENSITIVITY: 96 NG/L (ref 0–14)
TROPONIN, HIGH SENSITIVITY: 99 NG/L (ref 0–14)
UA DIPSTICK W REFLEX MICRO PNL UR: ABNORMAL
URN SPEC COLLECT METH UR: ABNORMAL
UROBILINOGEN UR STRIP-ACNC: 0.2 E.U./DL
WBC # BLD AUTO: 6.7 K/UL (ref 4–11)
WBC #/AREA URNS HPF: ABNORMAL /HPF (ref 0–5)

## 2025-05-04 PROCEDURE — 80053 COMPREHEN METABOLIC PANEL: CPT

## 2025-05-04 PROCEDURE — 2580000003 HC RX 258

## 2025-05-04 PROCEDURE — 85025 COMPLETE CBC W/AUTO DIFF WBC: CPT

## 2025-05-04 PROCEDURE — 36556 INSERT NON-TUNNEL CV CATH: CPT

## 2025-05-04 PROCEDURE — 36592 COLLECT BLOOD FROM PICC: CPT

## 2025-05-04 PROCEDURE — 71045 X-RAY EXAM CHEST 1 VIEW: CPT

## 2025-05-04 PROCEDURE — 2580000003 HC RX 258: Performed by: EMERGENCY MEDICINE

## 2025-05-04 PROCEDURE — 06HY33Z INSERTION OF INFUSION DEVICE INTO LOWER VEIN, PERCUTANEOUS APPROACH: ICD-10-PCS | Performed by: STUDENT IN AN ORGANIZED HEALTH CARE EDUCATION/TRAINING PROGRAM

## 2025-05-04 PROCEDURE — 2000000000 HC ICU R&B

## 2025-05-04 PROCEDURE — 36415 COLL VENOUS BLD VENIPUNCTURE: CPT

## 2025-05-04 PROCEDURE — 82803 BLOOD GASES ANY COMBINATION: CPT

## 2025-05-04 PROCEDURE — 82010 KETONE BODYS QUAN: CPT

## 2025-05-04 PROCEDURE — 94640 AIRWAY INHALATION TREATMENT: CPT

## 2025-05-04 PROCEDURE — 6360000002 HC RX W HCPCS: Performed by: STUDENT IN AN ORGANIZED HEALTH CARE EDUCATION/TRAINING PROGRAM

## 2025-05-04 PROCEDURE — 83036 HEMOGLOBIN GLYCOSYLATED A1C: CPT

## 2025-05-04 PROCEDURE — 83605 ASSAY OF LACTIC ACID: CPT

## 2025-05-04 PROCEDURE — 99285 EMERGENCY DEPT VISIT HI MDM: CPT

## 2025-05-04 PROCEDURE — 83735 ASSAY OF MAGNESIUM: CPT

## 2025-05-04 PROCEDURE — 84484 ASSAY OF TROPONIN QUANT: CPT

## 2025-05-04 PROCEDURE — 85520 HEPARIN ASSAY: CPT

## 2025-05-04 PROCEDURE — 93005 ELECTROCARDIOGRAM TRACING: CPT | Performed by: EMERGENCY MEDICINE

## 2025-05-04 PROCEDURE — 81001 URINALYSIS AUTO W/SCOPE: CPT

## 2025-05-04 PROCEDURE — 84100 ASSAY OF PHOSPHORUS: CPT

## 2025-05-04 PROCEDURE — 87040 BLOOD CULTURE FOR BACTERIA: CPT

## 2025-05-04 PROCEDURE — 83880 ASSAY OF NATRIURETIC PEPTIDE: CPT

## 2025-05-04 PROCEDURE — 96365 THER/PROPH/DIAG IV INF INIT: CPT

## 2025-05-04 PROCEDURE — 6370000000 HC RX 637 (ALT 250 FOR IP): Performed by: STUDENT IN AN ORGANIZED HEALTH CARE EDUCATION/TRAINING PROGRAM

## 2025-05-04 PROCEDURE — 6360000002 HC RX W HCPCS: Performed by: EMERGENCY MEDICINE

## 2025-05-04 PROCEDURE — 6370000000 HC RX 637 (ALT 250 FOR IP): Performed by: EMERGENCY MEDICINE

## 2025-05-04 PROCEDURE — 85730 THROMBOPLASTIN TIME PARTIAL: CPT

## 2025-05-04 PROCEDURE — 6360000002 HC RX W HCPCS: Performed by: NURSE PRACTITIONER

## 2025-05-04 PROCEDURE — 96375 TX/PRO/DX INJ NEW DRUG ADDON: CPT

## 2025-05-04 RX ORDER — POTASSIUM CHLORIDE 7.45 MG/ML
10 INJECTION INTRAVENOUS PRN
Status: DISCONTINUED | OUTPATIENT
Start: 2025-05-04 | End: 2025-05-04

## 2025-05-04 RX ORDER — AMLODIPINE BESYLATE 5 MG/1
2.5 TABLET ORAL DAILY
Status: DISCONTINUED | OUTPATIENT
Start: 2025-05-05 | End: 2025-05-06 | Stop reason: HOSPADM

## 2025-05-04 RX ORDER — BUDESONIDE AND FORMOTEROL FUMARATE DIHYDRATE 160; 4.5 UG/1; UG/1
2 AEROSOL RESPIRATORY (INHALATION)
Status: DISCONTINUED | OUTPATIENT
Start: 2025-05-04 | End: 2025-05-06 | Stop reason: HOSPADM

## 2025-05-04 RX ORDER — HEPARIN SODIUM 1000 [USP'U]/ML
30 INJECTION, SOLUTION INTRAVENOUS; SUBCUTANEOUS PRN
Status: DISCONTINUED | OUTPATIENT
Start: 2025-05-04 | End: 2025-05-04

## 2025-05-04 RX ORDER — DEXTROSE MONOHYDRATE AND SODIUM CHLORIDE 5; .45 G/100ML; G/100ML
INJECTION, SOLUTION INTRAVENOUS CONTINUOUS PRN
Status: CANCELLED | OUTPATIENT
Start: 2025-05-04

## 2025-05-04 RX ORDER — AMITRIPTYLINE HYDROCHLORIDE 10 MG/1
40 TABLET ORAL NIGHTLY
Status: DISCONTINUED | OUTPATIENT
Start: 2025-05-04 | End: 2025-05-04

## 2025-05-04 RX ORDER — DEXTROSE MONOHYDRATE 100 MG/ML
INJECTION, SOLUTION INTRAVENOUS CONTINUOUS PRN
Status: DISCONTINUED | OUTPATIENT
Start: 2025-05-04 | End: 2025-05-04 | Stop reason: SDUPTHER

## 2025-05-04 RX ORDER — 0.9 % SODIUM CHLORIDE 0.9 %
15 INTRAVENOUS SOLUTION INTRAVENOUS ONCE
Status: DISCONTINUED | OUTPATIENT
Start: 2025-05-04 | End: 2025-05-06 | Stop reason: HOSPADM

## 2025-05-04 RX ORDER — HEPARIN SODIUM 10000 [USP'U]/100ML
0-4000 INJECTION, SOLUTION INTRAVENOUS CONTINUOUS
Status: DISCONTINUED | OUTPATIENT
Start: 2025-05-04 | End: 2025-05-05

## 2025-05-04 RX ORDER — DOCUSATE SODIUM 100 MG/1
100 CAPSULE, LIQUID FILLED ORAL
Status: DISCONTINUED | OUTPATIENT
Start: 2025-05-05 | End: 2025-05-06 | Stop reason: HOSPADM

## 2025-05-04 RX ORDER — GLUCAGON 1 MG/ML
1 KIT INJECTION PRN
Status: DISCONTINUED | OUTPATIENT
Start: 2025-05-04 | End: 2025-05-06 | Stop reason: HOSPADM

## 2025-05-04 RX ORDER — LAMOTRIGINE 25 MG/1
25 TABLET ORAL DAILY
Status: DISCONTINUED | OUTPATIENT
Start: 2025-05-05 | End: 2025-05-06 | Stop reason: HOSPADM

## 2025-05-04 RX ORDER — ASPIRIN 81 MG/1
81 TABLET ORAL DAILY
Status: DISCONTINUED | OUTPATIENT
Start: 2025-05-05 | End: 2025-05-06 | Stop reason: HOSPADM

## 2025-05-04 RX ORDER — SODIUM CHLORIDE 9 MG/ML
INJECTION, SOLUTION INTRAVENOUS PRN
Status: CANCELLED | OUTPATIENT
Start: 2025-05-04

## 2025-05-04 RX ORDER — ONDANSETRON 2 MG/ML
4 INJECTION INTRAMUSCULAR; INTRAVENOUS EVERY 6 HOURS PRN
Status: DISCONTINUED | OUTPATIENT
Start: 2025-05-04 | End: 2025-05-06 | Stop reason: HOSPADM

## 2025-05-04 RX ORDER — HYDRALAZINE HYDROCHLORIDE 50 MG/1
100 TABLET, FILM COATED ORAL 3 TIMES DAILY
Status: DISCONTINUED | OUTPATIENT
Start: 2025-05-04 | End: 2025-05-06 | Stop reason: HOSPADM

## 2025-05-04 RX ORDER — MAGNESIUM SULFATE IN WATER 40 MG/ML
2000 INJECTION, SOLUTION INTRAVENOUS PRN
Status: DISCONTINUED | OUTPATIENT
Start: 2025-05-04 | End: 2025-05-04

## 2025-05-04 RX ORDER — PANTOPRAZOLE SODIUM 40 MG/1
40 TABLET, DELAYED RELEASE ORAL
Status: DISCONTINUED | OUTPATIENT
Start: 2025-05-05 | End: 2025-05-06 | Stop reason: HOSPADM

## 2025-05-04 RX ORDER — POLYETHYLENE GLYCOL 3350 17 G/17G
17 POWDER, FOR SOLUTION ORAL DAILY PRN
Status: DISCONTINUED | OUTPATIENT
Start: 2025-05-04 | End: 2025-05-06 | Stop reason: HOSPADM

## 2025-05-04 RX ORDER — SODIUM CHLORIDE 450 MG/100ML
INJECTION, SOLUTION INTRAVENOUS CONTINUOUS
Status: CANCELLED | OUTPATIENT
Start: 2025-05-04

## 2025-05-04 RX ORDER — 0.9 % SODIUM CHLORIDE 0.9 %
1000 INTRAVENOUS SOLUTION INTRAVENOUS ONCE
Status: COMPLETED | OUTPATIENT
Start: 2025-05-04 | End: 2025-05-04

## 2025-05-04 RX ORDER — SODIUM BICARBONATE 650 MG/1
650 TABLET ORAL 3 TIMES DAILY
Status: DISCONTINUED | OUTPATIENT
Start: 2025-05-04 | End: 2025-05-06 | Stop reason: HOSPADM

## 2025-05-04 RX ORDER — INSULIN GLARGINE 100 [IU]/ML
10 INJECTION, SOLUTION SUBCUTANEOUS NIGHTLY
Status: DISCONTINUED | OUTPATIENT
Start: 2025-05-04 | End: 2025-05-05

## 2025-05-04 RX ORDER — SODIUM CHLORIDE 0.9 % (FLUSH) 0.9 %
5-40 SYRINGE (ML) INJECTION PRN
Status: DISCONTINUED | OUTPATIENT
Start: 2025-05-04 | End: 2025-05-06 | Stop reason: HOSPADM

## 2025-05-04 RX ORDER — MEMANTINE HYDROCHLORIDE 5 MG/1
5 TABLET ORAL DAILY
Status: DISCONTINUED | OUTPATIENT
Start: 2025-05-04 | End: 2025-05-04

## 2025-05-04 RX ORDER — AMLODIPINE BESYLATE 2.5 MG/1
2.5 TABLET ORAL DAILY
COMMUNITY

## 2025-05-04 RX ORDER — QUETIAPINE FUMARATE 100 MG/1
100 TABLET, FILM COATED ORAL NIGHTLY
Status: DISCONTINUED | OUTPATIENT
Start: 2025-05-04 | End: 2025-05-06 | Stop reason: HOSPADM

## 2025-05-04 RX ORDER — ASPIRIN 81 MG/1
324 TABLET, CHEWABLE ORAL ONCE
Status: COMPLETED | OUTPATIENT
Start: 2025-05-04 | End: 2025-05-04

## 2025-05-04 RX ORDER — ATORVASTATIN CALCIUM 80 MG/1
80 TABLET, FILM COATED ORAL NIGHTLY
Status: DISCONTINUED | OUTPATIENT
Start: 2025-05-04 | End: 2025-05-06 | Stop reason: HOSPADM

## 2025-05-04 RX ORDER — ACETAMINOPHEN 650 MG/1
650 SUPPOSITORY RECTAL EVERY 6 HOURS PRN
Status: DISCONTINUED | OUTPATIENT
Start: 2025-05-04 | End: 2025-05-06 | Stop reason: HOSPADM

## 2025-05-04 RX ORDER — HEPARIN SODIUM 1000 [USP'U]/ML
60 INJECTION, SOLUTION INTRAVENOUS; SUBCUTANEOUS PRN
Status: DISCONTINUED | OUTPATIENT
Start: 2025-05-04 | End: 2025-05-04

## 2025-05-04 RX ORDER — SODIUM CHLORIDE 9 MG/ML
INJECTION, SOLUTION INTRAVENOUS CONTINUOUS
Status: DISCONTINUED | OUTPATIENT
Start: 2025-05-04 | End: 2025-05-05

## 2025-05-04 RX ORDER — SODIUM CHLORIDE 9 MG/ML
INJECTION, SOLUTION INTRAVENOUS CONTINUOUS
Status: DISCONTINUED | OUTPATIENT
Start: 2025-05-04 | End: 2025-05-04

## 2025-05-04 RX ORDER — HEPARIN SODIUM 1000 [USP'U]/ML
2900 INJECTION, SOLUTION INTRAVENOUS; SUBCUTANEOUS ONCE
Status: COMPLETED | OUTPATIENT
Start: 2025-05-04 | End: 2025-05-04

## 2025-05-04 RX ORDER — ACETAMINOPHEN 325 MG/1
650 TABLET ORAL EVERY 6 HOURS PRN
Status: DISCONTINUED | OUTPATIENT
Start: 2025-05-04 | End: 2025-05-06 | Stop reason: HOSPADM

## 2025-05-04 RX ORDER — AMLODIPINE BESYLATE 10 MG/1
10 TABLET ORAL DAILY
Status: DISCONTINUED | OUTPATIENT
Start: 2025-05-04 | End: 2025-05-04

## 2025-05-04 RX ORDER — POTASSIUM CHLORIDE 1500 MG/1
40 TABLET, EXTENDED RELEASE ORAL PRN
Status: DISCONTINUED | OUTPATIENT
Start: 2025-05-04 | End: 2025-05-04

## 2025-05-04 RX ORDER — POTASSIUM CHLORIDE 7.45 MG/ML
10 INJECTION INTRAVENOUS PRN
Status: CANCELLED | OUTPATIENT
Start: 2025-05-04 | End: 2025-05-07

## 2025-05-04 RX ORDER — INSULIN LISPRO 100 [IU]/ML
4 INJECTION, SOLUTION INTRAVENOUS; SUBCUTANEOUS
Status: DISCONTINUED | OUTPATIENT
Start: 2025-05-05 | End: 2025-05-05

## 2025-05-04 RX ORDER — DEXTROSE MONOHYDRATE AND SODIUM CHLORIDE 5; .45 G/100ML; G/100ML
INJECTION, SOLUTION INTRAVENOUS CONTINUOUS PRN
Status: DISCONTINUED | OUTPATIENT
Start: 2025-05-04 | End: 2025-05-05

## 2025-05-04 RX ORDER — FERROUS SULFATE 325(65) MG
325 TABLET ORAL
Status: DISCONTINUED | OUTPATIENT
Start: 2025-05-05 | End: 2025-05-06

## 2025-05-04 RX ORDER — SODIUM CHLORIDE 0.9 % (FLUSH) 0.9 %
5-40 SYRINGE (ML) INJECTION EVERY 12 HOURS SCHEDULED
Status: DISCONTINUED | OUTPATIENT
Start: 2025-05-04 | End: 2025-05-06 | Stop reason: HOSPADM

## 2025-05-04 RX ORDER — DEXTROSE MONOHYDRATE AND SODIUM CHLORIDE 5; .45 G/100ML; G/100ML
INJECTION, SOLUTION INTRAVENOUS CONTINUOUS PRN
Status: DISCONTINUED | OUTPATIENT
Start: 2025-05-04 | End: 2025-05-04

## 2025-05-04 RX ORDER — MORPHINE SULFATE 4 MG/ML
4 INJECTION, SOLUTION INTRAMUSCULAR; INTRAVENOUS ONCE
Refills: 0 | Status: COMPLETED | OUTPATIENT
Start: 2025-05-04 | End: 2025-05-04

## 2025-05-04 RX ORDER — LEVETIRACETAM 500 MG/1
500 TABLET ORAL 2 TIMES DAILY
Status: DISCONTINUED | OUTPATIENT
Start: 2025-05-04 | End: 2025-05-06 | Stop reason: HOSPADM

## 2025-05-04 RX ORDER — DEXTROSE MONOHYDRATE 100 MG/ML
INJECTION, SOLUTION INTRAVENOUS CONTINUOUS PRN
Status: DISCONTINUED | OUTPATIENT
Start: 2025-05-04 | End: 2025-05-06 | Stop reason: HOSPADM

## 2025-05-04 RX ORDER — HYDROXYZINE HYDROCHLORIDE 10 MG/1
25 TABLET, FILM COATED ORAL 3 TIMES DAILY PRN
Status: DISCONTINUED | OUTPATIENT
Start: 2025-05-04 | End: 2025-05-06 | Stop reason: HOSPADM

## 2025-05-04 RX ORDER — NITROGLYCERIN 0.4 MG/1
0.4 TABLET SUBLINGUAL EVERY 5 MIN PRN
Status: DISCONTINUED | OUTPATIENT
Start: 2025-05-04 | End: 2025-05-06 | Stop reason: HOSPADM

## 2025-05-04 RX ORDER — GLUCAGON 1 MG/ML
1 KIT INJECTION PRN
Status: DISCONTINUED | OUTPATIENT
Start: 2025-05-04 | End: 2025-05-04

## 2025-05-04 RX ORDER — MONTELUKAST SODIUM 10 MG/1
10 TABLET ORAL DAILY
Status: DISCONTINUED | OUTPATIENT
Start: 2025-05-05 | End: 2025-05-06 | Stop reason: HOSPADM

## 2025-05-04 RX ORDER — ONDANSETRON 4 MG/1
4 TABLET, ORALLY DISINTEGRATING ORAL EVERY 8 HOURS PRN
Status: DISCONTINUED | OUTPATIENT
Start: 2025-05-04 | End: 2025-05-06 | Stop reason: HOSPADM

## 2025-05-04 RX ORDER — ISOSORBIDE MONONITRATE 60 MG/1
60 TABLET, EXTENDED RELEASE ORAL 2 TIMES DAILY
Status: DISCONTINUED | OUTPATIENT
Start: 2025-05-04 | End: 2025-05-06 | Stop reason: HOSPADM

## 2025-05-04 RX ORDER — METOPROLOL SUCCINATE 50 MG/1
50 TABLET, EXTENDED RELEASE ORAL 2 TIMES DAILY
Status: DISCONTINUED | OUTPATIENT
Start: 2025-05-04 | End: 2025-05-06 | Stop reason: HOSPADM

## 2025-05-04 RX ORDER — MAGNESIUM SULFATE IN WATER 40 MG/ML
2000 INJECTION, SOLUTION INTRAVENOUS PRN
Status: CANCELLED | OUTPATIENT
Start: 2025-05-04

## 2025-05-04 RX ORDER — NITROGLYCERIN 0.4 MG/1
0.4 TABLET SUBLINGUAL ONCE
Status: COMPLETED | OUTPATIENT
Start: 2025-05-04 | End: 2025-05-04

## 2025-05-04 RX ORDER — MEMANTINE HYDROCHLORIDE 5 MG/1
10 TABLET ORAL DAILY
Status: DISCONTINUED | OUTPATIENT
Start: 2025-05-05 | End: 2025-05-06 | Stop reason: HOSPADM

## 2025-05-04 RX ORDER — PREDNISONE 20 MG/1
20 TABLET ORAL 2 TIMES DAILY
COMMUNITY

## 2025-05-04 RX ORDER — ONDANSETRON 2 MG/ML
4 INJECTION INTRAMUSCULAR; INTRAVENOUS ONCE
Status: COMPLETED | OUTPATIENT
Start: 2025-05-04 | End: 2025-05-04

## 2025-05-04 RX ADMIN — DEXTROSE AND SODIUM CHLORIDE: 5; 450 INJECTION, SOLUTION INTRAVENOUS at 17:40

## 2025-05-04 RX ADMIN — QUETIAPINE FUMARATE 100 MG: 100 TABLET ORAL at 21:23

## 2025-05-04 RX ADMIN — HYDROXYZINE HYDROCHLORIDE 25 MG: 10 TABLET, FILM COATED ORAL at 21:36

## 2025-05-04 RX ADMIN — DEXTROSE AND SODIUM CHLORIDE: 5; .45 INJECTION, SOLUTION INTRAVENOUS at 22:12

## 2025-05-04 RX ADMIN — NITROGLYCERIN 0.4 MG: 0.4 TABLET SUBLINGUAL at 12:41

## 2025-05-04 RX ADMIN — HYDROXYZINE HYDROCHLORIDE 25 MG: 10 TABLET, FILM COATED ORAL at 23:18

## 2025-05-04 RX ADMIN — ISOSORBIDE MONONITRATE 60 MG: 60 TABLET, EXTENDED RELEASE ORAL at 21:22

## 2025-05-04 RX ADMIN — METOPROLOL SUCCINATE 50 MG: 50 TABLET, EXTENDED RELEASE ORAL at 21:23

## 2025-05-04 RX ADMIN — HEPARIN SODIUM AND DEXTROSE 570 UNITS/HR: 10000; 5 INJECTION INTRAVENOUS at 19:31

## 2025-05-04 RX ADMIN — ONDANSETRON 4 MG: 2 INJECTION, SOLUTION INTRAMUSCULAR; INTRAVENOUS at 15:17

## 2025-05-04 RX ADMIN — ATORVASTATIN CALCIUM 80 MG: 80 TABLET, FILM COATED ORAL at 21:23

## 2025-05-04 RX ADMIN — HYDRALAZINE HYDROCHLORIDE 100 MG: 50 TABLET ORAL at 21:22

## 2025-05-04 RX ADMIN — MORPHINE SULFATE 4 MG: 4 INJECTION, SOLUTION INTRAMUSCULAR; INTRAVENOUS at 15:17

## 2025-05-04 RX ADMIN — ASPIRIN 324 MG: 81 TABLET, CHEWABLE ORAL at 12:40

## 2025-05-04 RX ADMIN — Medication 2 PUFF: at 21:23

## 2025-05-04 RX ADMIN — MORPHINE SULFATE 4 MG: 4 INJECTION, SOLUTION INTRAMUSCULAR; INTRAVENOUS at 21:14

## 2025-05-04 RX ADMIN — TICAGRELOR 90 MG: 90 TABLET ORAL at 21:23

## 2025-05-04 RX ADMIN — SODIUM CHLORIDE 3.5 UNITS/HR: 9 INJECTION, SOLUTION INTRAVENOUS at 17:32

## 2025-05-04 RX ADMIN — SODIUM BICARBONATE 650 MG: 650 TABLET ORAL at 21:23

## 2025-05-04 RX ADMIN — LEVETIRACETAM 500 MG: 500 TABLET, FILM COATED ORAL at 21:22

## 2025-05-04 RX ADMIN — POTASSIUM CHLORIDE 10 MEQ: 7.46 INJECTION, SOLUTION INTRAVENOUS at 17:16

## 2025-05-04 RX ADMIN — HEPARIN SODIUM AND DEXTROSE 570 UNITS/HR: 10000; 5 INJECTION INTRAVENOUS at 16:13

## 2025-05-04 RX ADMIN — SODIUM CHLORIDE 1000 ML: 0.9 INJECTION, SOLUTION INTRAVENOUS at 16:06

## 2025-05-04 RX ADMIN — HEPARIN SODIUM 2900 UNITS: 1000 INJECTION INTRAVENOUS; SUBCUTANEOUS at 16:08

## 2025-05-04 RX ADMIN — POTASSIUM CHLORIDE 10 MEQ: 7.46 INJECTION, SOLUTION INTRAVENOUS at 18:16

## 2025-05-04 RX ADMIN — HUMAN INSULIN 5 UNITS: 100 INJECTION, SOLUTION SUBCUTANEOUS at 16:21

## 2025-05-04 ASSESSMENT — PAIN DESCRIPTION - DESCRIPTORS
DESCRIPTORS: HEAVINESS
DESCRIPTORS: SQUEEZING

## 2025-05-04 ASSESSMENT — PAIN SCALES - GENERAL
PAINLEVEL_OUTOF10: 10
PAINLEVEL_OUTOF10: 8
PAINLEVEL_OUTOF10: 10
PAINLEVEL_OUTOF10: 8
PAINLEVEL_OUTOF10: 8

## 2025-05-04 ASSESSMENT — PAIN DESCRIPTION - LOCATION
LOCATION: CHEST
LOCATION: CHEST
LOCATION: CHEST;JAW;ARM
LOCATION: CHEST
LOCATION: CHEST

## 2025-05-04 ASSESSMENT — PAIN DESCRIPTION - ORIENTATION
ORIENTATION: LEFT
ORIENTATION: MID

## 2025-05-04 ASSESSMENT — PAIN DESCRIPTION - PAIN TYPE: TYPE: ACUTE PAIN

## 2025-05-04 ASSESSMENT — PAIN DESCRIPTION - ONSET: ONSET: ON-GOING

## 2025-05-04 ASSESSMENT — HEART SCORE: ECG: NON-SPECIFC REPOLARIZATION DISTURBANCE/LBTB/PM

## 2025-05-04 ASSESSMENT — PAIN DESCRIPTION - DIRECTION: RADIATING_TOWARDS: L

## 2025-05-04 ASSESSMENT — PAIN DESCRIPTION - FREQUENCY: FREQUENCY: CONTINUOUS

## 2025-05-04 ASSESSMENT — PAIN - FUNCTIONAL ASSESSMENT
PAIN_FUNCTIONAL_ASSESSMENT: ACTIVITIES ARE NOT PREVENTED
PAIN_FUNCTIONAL_ASSESSMENT: 0-10

## 2025-05-04 NOTE — ED NOTES
Chief Complaint   Patient presents with   • Sinus Problem     facial pain, ear pain, jaw pain, forehead and eye pain       HISTORY OF PRESENT ILLNESS:  This is a 34 year old White female coming in for chronic sinusitis.  Last episode of sinusitis was December 17th, 2019 where she got 17 days of Omnicef.  Patient reports she has had chronic sinus issues for the last fiber 6 years.  Patient will take a course of antibiotics at least once a year.  She reports she tends not to come in when she has the symptoms because she doesn't actually feel the antibiotics helped.  She reports that when she was a teenager she had a scan of her sinuses and was told she may need surgery.  Patient has tried Flonase however it seem to burn her sinuses so she has not continued.  She has also tried Mucinex which did not appeared to help much.  Patient reports that she does have environmental allergies.  Patient has been taking a daily 10 mg cetirizine with minimal results.    Patient reports having maxillary and frontal sinus pain daily with minimal help from steroid nasal sprays.  Needs next.  Antibiotics.  Saline wash.    Patient denies fever, chills, night sweats, halitosis, tooth pain.  Patient does feel that she has ear congestion.  Denies sore throat.  Patient felt that the Neti pot did not help.     Patient does not smoke,vape, or illicit drugs.          Past Medical History:   Diagnosis Date   • Abnormal Pap smear of cervix    • Genital warts    • Pyelonephritis 2007       Past Surgical History:   Procedure Laterality Date   • Pap smear  5/2016    PAP HPV neg repeat 5 years.         ALLERGIES:   Allergen Reactions   • Acyclovir Other (See Comments)     Unable to urinate       Social History     Socioeconomic History   • Marital status: Single     Spouse name: Not on file   • Number of children: Not on file   • Years of education: Not on file   • Highest education level: Not on file   Occupational History   • Not on file   Social  Lexii DE LA VEGA attempted to place IV on pt's was unsuccessful after 2 attempts. Chapo Knox DO notfied   Needs   • Financial resource strain: Not on file   • Food insecurity:     Worry: Not on file     Inability: Not on file   • Transportation needs:     Medical: Not on file     Non-medical: Not on file   Tobacco Use   • Smoking status: Former Smoker   • Smokeless tobacco: Former User     Quit date: 1/1/2011   Substance and Sexual Activity   • Alcohol use: Yes     Alcohol/week: 0.0 standard drinks     Comment: Socially   • Drug use: No   • Sexual activity: Yes     Partners: Male     Birth control/protection: Condom   Lifestyle   • Physical activity:     Days per week: Not on file     Minutes per session: Not on file   • Stress: Not on file   Relationships   • Social connections:     Talks on phone: Not on file     Gets together: Not on file     Attends Alevism service: Not on file     Active member of club or organization: Not on file     Attends meetings of clubs or organizations: Not on file     Relationship status: Not on file   • Intimate partner violence:     Fear of current or ex partner: Not on file     Emotionally abused: Not on file     Physically abused: Not on file     Forced sexual activity: Not on file   Other Topics Concern   • Not on file   Social History Narrative    Travels around a lot. 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         Family History:  Mother age 60 alive with hypothyroid and IBS.  Father age 57 alive and well.  Patient has one younger brother health.        Social History:   Born in [place].  [facility]. [facility].  Currently single.        GYN History:  G1, TAB1.         Outpatient Medications Marked as Taking for the 1/16/20 encounter (Office Visit) with Kaycee Matt MD   Medication Sig Dispense Refill   • guaiFENesin (MUCINEX) 600 MG 12 hr tablet Take 2 tablets by mouth 2 times daily. 20 tablet 0   • Valacyclovir HCl 1000 MG Tab TAKE 1 TABLET BY MOUTH DAILY FOR 5 DAYS DURING OUTBREAK 30 tablet 0   • cholecalciferol (VITAMIN D3) 1000  UNITS tablet Take 1,000 Units by mouth daily.     • Ascorbic Acid (VITAMIN C) 1000 MG tablet Take 1,000 mg by mouth daily.     • Probiotic Product (PROBIOTIC DAILY PO)      • ELDERBERRY PO Take 1 tablet by mouth daily.          Family History   Problem Relation Age of Onset   • Other Other         denies colon and breast cancer   • Other Other         denies early onset heart disease   • Other Other         denies thyroid disorder or diabetes       Patient Care Team:  Laurel Atkins NP as PCP - General (Nurse Practitioner)     REVIEW OF SYSTEMS:  GENERAL:  The patient denies weight gain, weight loss, loss of appetite, fevers, chills, fatigue or night sweats.  SKIN:  The patient denies any rashes or skin discoloration.  HEAD:  The patient denies headaches, dizziness, masses or seizures.  EENT:  The patient denies visual changes, eye pain, tinnitus, vertigo, hearing loss, nosebleeds or sinus disease.  RESPIRATORY:  The patient denies cough, shortness of breath or sputum production.  CARDIOVASCULAR:  The patient denies chest pain, dyspnea on exertion, claudication, edema or valvular disease.  GASTROINTESTINAL:  The patient denies dysphagia, abdominal pain, nausea, vomiting, diarrhea, constipation, hematemesis, melena or hematochezia.  GENITOURINARY:  The patient denies dysuria, frequency, hesitancy or hematuria.  GYNECOLOGIC:  No vaginal discharge, itching or burning.  ENDOCRINE:  The patient denies polyuria, polydipsia, skin or hair changes or heat intolerance.  MUSCULOSKELETAL:  The patient denies joint pain, swelling, arthritis or myalgias.  NEUROLOGIC AND PSYCHIATRIC:  The patient denies weakness, seizures, memory changes or depression.      PHYSICAL EXAM:  GENERAL:   This is an alert and oriented female in no acute distress.  Visit Vitals  BP 96/60 (BP Location: LUE - Left upper extremity, Patient Position: Sitting, Cuff Size: Regular)   Pulse 92   Resp 16   Ht 5' 4.5\" (1.638 m)   Wt 51.7 kg   BMI 19.27 kg/m²     Body mass index is 19.27 kg/m².  HEENT:  Head is normocephalic and atraumatic.  Eyes: Pupils equal, round, reactive to light and accommodation; extraocular movements intact.  Conjunctivae are normal.  Sclerae are anicteric.  Ears:  Tympanic membranes are normal.  External auditory canals normal.  External ears and nose are normal.  Nasal mucosa, septum and turbinates are normal.  Mouth revealed a benign posterior pharynx, normal dentition.  Normal lips and gums.  NECK:  Supple, no thyroid masses.  No cervical or supraclavicular lymphadenopathy.  No jugular venous distention.  No carotid bruit.  Pain to palpation of both maxillary sinuses.  LUNGS:  Clear to auscultation and percussion.  Good respiratory effort.  HEART:  Regular rate and rhythm, normal S1, S2 without a murmur, rub or heave.  Point of maximal impulse was normal.  BREASTS:  Without masses or nipple discharge.  No axillary lymph nodes noted.  ABDOMEN:  Soft, nontender, without hepatosplenomegaly.  No masses noted.  Aorta was normal.  Bowel sounds are present.  No abdominal bruits heard.  No hernias noted.  EXTREMITIES:  Without cyanosis, clubbing or edema.    NEUROLOGIC:  Deep tendon reflexes are 2+ in the upper and lower extremities.  No gross motor or sensory deficits noted.  Gait and station were normal.  Digits and nails were normal.  The patient demonstrated good judgment, insight, memory, mood and affect.      ASSESSMENT:  Chronic pansinusitis    - CT SINUS WO CONTRAST; Future  - SERVICE TO ENT      PLAN:    Patient Instructions   An order has been placed for ENT. You have been referred to [facility].  Locations in [facility] and [place].  Call [phone] for an appointment.    Try Nasacort nasal spray 2 sprays in each nostril nightly.  Please call ENT for an appointment  Order for sinus CT has been placed.  Central scheduling will call you to arrange a date for the CT.    TOMA Atkins NP April 2020 for  annual exam

## 2025-05-04 NOTE — ED NOTES
ED SBAR report provider to 2w, RN. Patient to be transported to Room 2105 via stretcher by [facility] .Patient transported with bedside cardiac monitor and with IV medications infusing. IV site clean, dry, and intact. . Updated patient on plan of care.

## 2025-05-04 NOTE — ED NOTES
Report given [facility]. [facility] transporting pt to [facility] at this time. All questions answered.

## 2025-05-04 NOTE — ED PROVIDER NOTES
[facility] EMERGENCY DEPARTMENT  EMERGENCY DEPARTMENT ENCOUNTER      Pt Name: Maren Meza  MRN: [id number]  Birthdate 1955  Date of evaluation: 5/4/2025  Provider: HOSSEIN STRINGER DO    CHIEF COMPLAINT       Chief Complaint   Patient presents with    Chest Pain     Pt into Ed for chest pain 10/10. Pt states chest pain started at 5 am. Pt took nitro then and helped with pain. Pt states pain is now coming back . Pt does have cardiac hx. Pt states left sided chest pain that radiates to jaw and down left arm         HISTORY OF PRESENT ILLNESS   (Location/Symptom, Timing/Onset, Context/Setting, Quality, Duration, Modifying Factors, Severity)  Note limiting factors.   Maren Meza is a 69 y.o. female who presents to the emergency department with a complaint of chest pressure that waking her from sleep at 5 AM this morning.  She states that she had a real heaviness and pressure in the left chest rating to the left arm.  She states that it is identical to what she experienced in the past when she had to have stents placed.  She denies any associated shortness of breath or diaphoresis.  She took a couple of nitro tablets and it relieved her discomfort.  However, later this morning prior to arrival it came back.  She currently rates her pain as 6/10 intensity.    She does have an extensive history of coronary artery disease and has had multiple prior coronary stents placed.  Most recent procedure was on January 31, 2025.  She reports that she has been doing well since that time.  She does take Brilinta and is compliant with her Brilinta and aspirin therapy.    She did feel that her heart was beating funny while this was happening.  No current palpitations    She denies any headache earache sore throat sinus drainage.  No cough or cold symptoms.  No fever or chills.  No diaphoresis.  No dizziness or lightheadedness.  No syncope.    She denies any abdominal pain back pain or flank pain.  No melena or

## 2025-05-04 NOTE — PROGRESS NOTES
Clinical Pharmacy Note  Heparin Dosing Consult    Maren Meza is a 69 y.o. female ordered heparin per CAD/STEMI/NSTEMI/UA/AFIB nomogram by Dr. Knox.     Lab Results   Component Value Date/Time    ANTIXAUHEP 0.42 04/22/2024 11:32 AM      Lab Results   Component Value Date/Time    HGB 8.4 05/04/2025 02:21 PM    HCT 26.0 05/04/2025 02:21 PM     05/04/2025 02:21 PM    INR 1.06 12/07/2024 08:25 PM       Ht Readings from Last 1 Encounters:   05/04/25 1.524 m (5')        Wt Readings from Last 1 Encounters:   05/04/25 47.7 kg (105 lb 2.6 oz)        Assessment/Plan:  Initial bolus: 2900 units  Initial infusion rate: 570 units/hr  Next anti-Xa:: tbd, 6 hours post infusion starting    Pharmacy will continue to monitor adjust heparin based on anti-Xa results using nomogram below:     CAD/STEMI/NSTEMI/UA/AFIB Heparin Nomogram     Initial Bolus: 60 units/kg Max Bolus: 4,000 units       Initial Rate: 12 units/kg/hr Max Initial Rate: 1,000 units/hr     anti-Xa Bolus Titration   < 0.1 Heparin 60 units/kg bolus Increase drip by 4 units/kg/hr   0.1 - 0.29 Heparin 30 units/kg bolus Increase drip by 2 units/kg/hr   0.3 - 0.7 No Bolus No Change   0.71 - 0.8 No Bolus Decrease drip by 1 units/kg/hr   0.81 - 0.99 No Bolus Decrease drip by 2 units/kg/hr   > 1 Hold Heparin for 1 hour Decrease drip by 3 units/kg/hr     Obtain anti-Xa 6 hours after initial bolus and 6 hours after any dose change until two consecutive therapeutic anti-Xa levels are achieved - then daily.     Barbara Brian RPH, PharmD 5/4/2025 4:03 PM

## 2025-05-05 LAB
ANION GAP SERPL CALCULATED.3IONS-SCNC: 8 MMOL/L (ref 3–16)
ANION GAP SERPL CALCULATED.3IONS-SCNC: 8 MMOL/L (ref 3–16)
ANION GAP SERPL CALCULATED.3IONS-SCNC: 9 MMOL/L (ref 3–16)
ANTI-XA UNFRAC HEPARIN: 0.58 IU/ML (ref 0.3–0.7)
BASE EXCESS BLDV CALC-SCNC: -10.5 MMOL/L
BASE EXCESS BLDV CALC-SCNC: -10.8 MMOL/L
BASOPHILS # BLD: 0 K/UL (ref 0–0.2)
BASOPHILS NFR BLD: 0.2 %
BUN SERPL-MCNC: 43 MG/DL (ref 7–20)
BUN SERPL-MCNC: 46 MG/DL (ref 7–20)
BUN SERPL-MCNC: 49 MG/DL (ref 7–20)
CALCIUM SERPL-MCNC: 8 MG/DL (ref 8.3–10.6)
CALCIUM SERPL-MCNC: 8 MG/DL (ref 8.3–10.6)
CALCIUM SERPL-MCNC: 8.2 MG/DL (ref 8.3–10.6)
CHLORIDE SERPL-SCNC: 112 MMOL/L (ref 99–110)
CHLORIDE SERPL-SCNC: 114 MMOL/L (ref 99–110)
CHLORIDE SERPL-SCNC: 114 MMOL/L (ref 99–110)
CO2 BLDV-SCNC: 17 MMOL/L
CO2 BLDV-SCNC: 17 MMOL/L
CO2 SERPL-SCNC: 15 MMOL/L (ref 21–32)
CO2 SERPL-SCNC: 15 MMOL/L (ref 21–32)
CO2 SERPL-SCNC: 19 MMOL/L (ref 21–32)
COHGB MFR BLDV: 1.2 %
COHGB MFR BLDV: 1.3 %
CREAT SERPL-MCNC: 1.8 MG/DL (ref 0.6–1.2)
CREAT SERPL-MCNC: 1.9 MG/DL (ref 0.6–1.2)
CREAT SERPL-MCNC: 1.9 MG/DL (ref 0.6–1.2)
DEPRECATED RDW RBC AUTO: 15.4 % (ref 12.4–15.4)
EKG ATRIAL RATE: 67 BPM
EKG ATRIAL RATE: 76 BPM
EKG DIAGNOSIS: NORMAL
EKG DIAGNOSIS: NORMAL
EKG P AXIS: -13 DEGREES
EKG P AXIS: -14 DEGREES
EKG P-R INTERVAL: 120 MS
EKG P-R INTERVAL: 124 MS
EKG Q-T INTERVAL: 404 MS
EKG Q-T INTERVAL: 434 MS
EKG QRS DURATION: 82 MS
EKG QRS DURATION: 84 MS
EKG QTC CALCULATION (BAZETT): 454 MS
EKG QTC CALCULATION (BAZETT): 458 MS
EKG R AXIS: 25 DEGREES
EKG R AXIS: 39 DEGREES
EKG T AXIS: 85 DEGREES
EKG T AXIS: 94 DEGREES
EKG VENTRICULAR RATE: 67 BPM
EKG VENTRICULAR RATE: 76 BPM
EOSINOPHIL # BLD: 0.1 K/UL (ref 0–0.6)
EOSINOPHIL NFR BLD: 1 %
EST. AVERAGE GLUCOSE BLD GHB EST-MCNC: 228.8 MG/DL
GFR SERPLBLD CREATININE-BSD FMLA CKD-EPI: 28 ML/MIN/{1.73_M2}
GFR SERPLBLD CREATININE-BSD FMLA CKD-EPI: 28 ML/MIN/{1.73_M2}
GFR SERPLBLD CREATININE-BSD FMLA CKD-EPI: 30 ML/MIN/{1.73_M2}
GLUCOSE BLD-MCNC: 124 MG/DL (ref 70–99)
GLUCOSE BLD-MCNC: 144 MG/DL (ref 70–99)
GLUCOSE BLD-MCNC: 144 MG/DL (ref 70–99)
GLUCOSE BLD-MCNC: 147 MG/DL (ref 70–99)
GLUCOSE BLD-MCNC: 150 MG/DL (ref 70–99)
GLUCOSE BLD-MCNC: 156 MG/DL (ref 70–99)
GLUCOSE BLD-MCNC: 158 MG/DL (ref 70–99)
GLUCOSE BLD-MCNC: 164 MG/DL (ref 70–99)
GLUCOSE BLD-MCNC: 164 MG/DL (ref 70–99)
GLUCOSE BLD-MCNC: 165 MG/DL (ref 70–99)
GLUCOSE BLD-MCNC: 168 MG/DL (ref 70–99)
GLUCOSE BLD-MCNC: 170 MG/DL (ref 70–99)
GLUCOSE BLD-MCNC: 86 MG/DL (ref 70–99)
GLUCOSE SERPL-MCNC: 135 MG/DL (ref 70–99)
GLUCOSE SERPL-MCNC: 140 MG/DL (ref 70–99)
GLUCOSE SERPL-MCNC: 160 MG/DL (ref 70–99)
HBA1C MFR BLD: 9.6 %
HCO3 BLDV-SCNC: 16 MMOL/L (ref 23–29)
HCO3 BLDV-SCNC: 16 MMOL/L (ref 23–29)
HCT VFR BLD AUTO: 23.8 % (ref 36–48)
HGB BLD-MCNC: 7.8 G/DL (ref 12–16)
LYMPHOCYTES # BLD: 0.9 K/UL (ref 1–5.1)
LYMPHOCYTES NFR BLD: 10.9 %
MAGNESIUM SERPL-MCNC: 2.09 MG/DL (ref 1.8–2.4)
MAGNESIUM SERPL-MCNC: 2.1 MG/DL (ref 1.8–2.4)
MAGNESIUM SERPL-MCNC: 2.14 MG/DL (ref 1.8–2.4)
MCH RBC QN AUTO: 31.1 PG (ref 26–34)
MCHC RBC AUTO-ENTMCNC: 32.7 G/DL (ref 31–36)
MCV RBC AUTO: 95.1 FL (ref 80–100)
METHGB MFR BLDV: 0.6 %
METHGB MFR BLDV: 0.7 %
MONOCYTES # BLD: 0.6 K/UL (ref 0–1.3)
MONOCYTES NFR BLD: 7.7 %
NEUTROPHILS # BLD: 6.7 K/UL (ref 1.7–7.7)
NEUTROPHILS NFR BLD: 80.2 %
O2 THERAPY: ABNORMAL
O2 THERAPY: ABNORMAL
PCO2 BLDV: 38 MMHG (ref 40–50)
PCO2 BLDV: 38.6 MMHG (ref 40–50)
PERFORMED ON: ABNORMAL
PERFORMED ON: NORMAL
PH BLDV: 7.23 [PH] (ref 7.35–7.45)
PH BLDV: 7.23 [PH] (ref 7.35–7.45)
PHOSPHATE SERPL-MCNC: 3.2 MG/DL (ref 2.5–4.9)
PHOSPHATE SERPL-MCNC: 3.6 MG/DL (ref 2.5–4.9)
PHOSPHATE SERPL-MCNC: 3.6 MG/DL (ref 2.5–4.9)
PLATELET # BLD AUTO: 146 K/UL (ref 135–450)
PMV BLD AUTO: 8.7 FL (ref 5–10.5)
PO2 BLDV: 35 MMHG
PO2 BLDV: 40 MMHG
POTASSIUM SERPL-SCNC: 4.2 MMOL/L (ref 3.5–5.1)
POTASSIUM SERPL-SCNC: 4.7 MMOL/L (ref 3.5–5.1)
POTASSIUM SERPL-SCNC: 4.8 MMOL/L (ref 3.5–5.1)
RBC # BLD AUTO: 2.5 M/UL (ref 4–5.2)
SAO2 % BLDV: 60 %
SAO2 % BLDV: 69 %
SODIUM SERPL-SCNC: 136 MMOL/L (ref 136–145)
SODIUM SERPL-SCNC: 137 MMOL/L (ref 136–145)
SODIUM SERPL-SCNC: 141 MMOL/L (ref 136–145)
WBC # BLD AUTO: 8.3 K/UL (ref 4–11)

## 2025-05-05 PROCEDURE — 6360000002 HC RX W HCPCS: Performed by: INTERNAL MEDICINE

## 2025-05-05 PROCEDURE — 83735 ASSAY OF MAGNESIUM: CPT

## 2025-05-05 PROCEDURE — 94640 AIRWAY INHALATION TREATMENT: CPT

## 2025-05-05 PROCEDURE — 36415 COLL VENOUS BLD VENIPUNCTURE: CPT

## 2025-05-05 PROCEDURE — 36592 COLLECT BLOOD FROM PICC: CPT

## 2025-05-05 PROCEDURE — 94761 N-INVAS EAR/PLS OXIMETRY MLT: CPT

## 2025-05-05 PROCEDURE — 6370000000 HC RX 637 (ALT 250 FOR IP)

## 2025-05-05 PROCEDURE — 2500000003 HC RX 250 WO HCPCS: Performed by: STUDENT IN AN ORGANIZED HEALTH CARE EDUCATION/TRAINING PROGRAM

## 2025-05-05 PROCEDURE — 99222 1ST HOSP IP/OBS MODERATE 55: CPT | Performed by: INTERNAL MEDICINE

## 2025-05-05 PROCEDURE — 6370000000 HC RX 637 (ALT 250 FOR IP): Performed by: NURSE PRACTITIONER

## 2025-05-05 PROCEDURE — 85520 HEPARIN ASSAY: CPT

## 2025-05-05 PROCEDURE — 2500000003 HC RX 250 WO HCPCS

## 2025-05-05 PROCEDURE — 6370000000 HC RX 637 (ALT 250 FOR IP): Performed by: STUDENT IN AN ORGANIZED HEALTH CARE EDUCATION/TRAINING PROGRAM

## 2025-05-05 PROCEDURE — 6360000002 HC RX W HCPCS: Performed by: STUDENT IN AN ORGANIZED HEALTH CARE EDUCATION/TRAINING PROGRAM

## 2025-05-05 PROCEDURE — 2580000003 HC RX 258

## 2025-05-05 PROCEDURE — 85025 COMPLETE CBC W/AUTO DIFF WBC: CPT

## 2025-05-05 PROCEDURE — 87040 BLOOD CULTURE FOR BACTERIA: CPT

## 2025-05-05 PROCEDURE — 80048 BASIC METABOLIC PNL TOTAL CA: CPT

## 2025-05-05 PROCEDURE — 84100 ASSAY OF PHOSPHORUS: CPT

## 2025-05-05 PROCEDURE — 6360000002 HC RX W HCPCS: Performed by: NURSE PRACTITIONER

## 2025-05-05 PROCEDURE — 2060000000 HC ICU INTERMEDIATE R&B

## 2025-05-05 PROCEDURE — 6360000002 HC RX W HCPCS

## 2025-05-05 PROCEDURE — 82803 BLOOD GASES ANY COMBINATION: CPT

## 2025-05-05 PROCEDURE — 93010 ELECTROCARDIOGRAM REPORT: CPT | Performed by: INTERNAL MEDICINE

## 2025-05-05 RX ORDER — MORPHINE SULFATE 2 MG/ML
2 INJECTION, SOLUTION INTRAMUSCULAR; INTRAVENOUS
Status: COMPLETED | OUTPATIENT
Start: 2025-05-05 | End: 2025-05-05

## 2025-05-05 RX ORDER — INDOMETHACIN 25 MG/1
100 CAPSULE ORAL ONCE
Status: COMPLETED | OUTPATIENT
Start: 2025-05-05 | End: 2025-05-05

## 2025-05-05 RX ORDER — INSULIN LISPRO 100 [IU]/ML
2 INJECTION, SOLUTION INTRAVENOUS; SUBCUTANEOUS
Status: DISCONTINUED | OUTPATIENT
Start: 2025-05-05 | End: 2025-05-06 | Stop reason: HOSPADM

## 2025-05-05 RX ORDER — HEPARIN SODIUM 5000 [USP'U]/ML
5000 INJECTION, SOLUTION INTRAVENOUS; SUBCUTANEOUS EVERY 8 HOURS SCHEDULED
Status: DISCONTINUED | OUTPATIENT
Start: 2025-05-05 | End: 2025-05-06

## 2025-05-05 RX ORDER — RANOLAZINE 500 MG/1
500 TABLET, EXTENDED RELEASE ORAL 2 TIMES DAILY
Status: ON HOLD | COMMUNITY
End: 2025-05-05

## 2025-05-05 RX ORDER — INSULIN GLARGINE 100 [IU]/ML
0.15 INJECTION, SOLUTION SUBCUTANEOUS DAILY
Status: DISCONTINUED | OUTPATIENT
Start: 2025-05-05 | End: 2025-05-05

## 2025-05-05 RX ORDER — INSULIN GLARGINE 100 [IU]/ML
0.15 INJECTION, SOLUTION SUBCUTANEOUS NIGHTLY
Status: DISCONTINUED | OUTPATIENT
Start: 2025-05-05 | End: 2025-05-06 | Stop reason: HOSPADM

## 2025-05-05 RX ORDER — MORPHINE SULFATE 2 MG/ML
2 INJECTION, SOLUTION INTRAMUSCULAR; INTRAVENOUS EVERY 4 HOURS PRN
Refills: 0 | Status: DISCONTINUED | OUTPATIENT
Start: 2025-05-05 | End: 2025-05-06

## 2025-05-05 RX ORDER — DAPAGLIFLOZIN 10 MG/1
10 TABLET, FILM COATED ORAL EVERY MORNING
COMMUNITY

## 2025-05-05 RX ORDER — POTASSIUM CHLORIDE 1500 MG/1
20 TABLET, EXTENDED RELEASE ORAL DAILY
COMMUNITY

## 2025-05-05 RX ORDER — POTASSIUM CHLORIDE 7.45 MG/ML
10 INJECTION INTRAVENOUS ONCE
Status: COMPLETED | OUTPATIENT
Start: 2025-05-05 | End: 2025-05-05

## 2025-05-05 RX ORDER — MAGNESIUM SULFATE IN WATER 40 MG/ML
2000 INJECTION, SOLUTION INTRAVENOUS PRN
Status: DISCONTINUED | OUTPATIENT
Start: 2025-05-05 | End: 2025-05-05

## 2025-05-05 RX ORDER — POTASSIUM CHLORIDE 7.45 MG/ML
10 INJECTION INTRAVENOUS
Status: COMPLETED | OUTPATIENT
Start: 2025-05-05 | End: 2025-05-05

## 2025-05-05 RX ORDER — INSULIN LISPRO 100 [IU]/ML
0-4 INJECTION, SOLUTION INTRAVENOUS; SUBCUTANEOUS EVERY 4 HOURS
Status: DISCONTINUED | OUTPATIENT
Start: 2025-05-05 | End: 2025-05-06 | Stop reason: HOSPADM

## 2025-05-05 RX ADMIN — MORPHINE SULFATE 2 MG: 2 INJECTION, SOLUTION INTRAMUSCULAR; INTRAVENOUS at 19:23

## 2025-05-05 RX ADMIN — ISOSORBIDE MONONITRATE 60 MG: 60 TABLET, EXTENDED RELEASE ORAL at 20:09

## 2025-05-05 RX ADMIN — HYDRALAZINE HYDROCHLORIDE 100 MG: 50 TABLET ORAL at 13:52

## 2025-05-05 RX ADMIN — METOPROLOL SUCCINATE 50 MG: 50 TABLET, EXTENDED RELEASE ORAL at 08:16

## 2025-05-05 RX ADMIN — POTASSIUM CHLORIDE 10 MEQ: 7.46 INJECTION, SOLUTION INTRAVENOUS at 01:31

## 2025-05-05 RX ADMIN — DEXTROSE AND SODIUM CHLORIDE: 5; .45 INJECTION, SOLUTION INTRAVENOUS at 04:59

## 2025-05-05 RX ADMIN — SODIUM BICARBONATE 100 MEQ: 84 INJECTION INTRAVENOUS at 05:29

## 2025-05-05 RX ADMIN — LAMOTRIGINE 25 MG: 25 TABLET ORAL at 08:16

## 2025-05-05 RX ADMIN — Medication 2 PUFF: at 20:20

## 2025-05-05 RX ADMIN — MORPHINE SULFATE 2 MG: 2 INJECTION, SOLUTION INTRAMUSCULAR; INTRAVENOUS at 14:25

## 2025-05-05 RX ADMIN — SODIUM CHLORIDE, PRESERVATIVE FREE 10 ML: 5 INJECTION INTRAVENOUS at 20:08

## 2025-05-05 RX ADMIN — SODIUM BICARBONATE 650 MG: 650 TABLET ORAL at 13:52

## 2025-05-05 RX ADMIN — INSULIN LISPRO 2 UNITS: 100 INJECTION, SOLUTION INTRAVENOUS; SUBCUTANEOUS at 18:24

## 2025-05-05 RX ADMIN — POTASSIUM CHLORIDE 10 MEQ: 7.46 INJECTION, SOLUTION INTRAVENOUS at 03:48

## 2025-05-05 RX ADMIN — LEVETIRACETAM 500 MG: 500 TABLET, FILM COATED ORAL at 08:15

## 2025-05-05 RX ADMIN — SODIUM BICARBONATE 650 MG: 650 TABLET ORAL at 08:15

## 2025-05-05 RX ADMIN — MORPHINE SULFATE 2 MG: 2 INJECTION, SOLUTION INTRAMUSCULAR; INTRAVENOUS at 03:45

## 2025-05-05 RX ADMIN — TICAGRELOR 90 MG: 90 TABLET ORAL at 08:16

## 2025-05-05 RX ADMIN — POTASSIUM CHLORIDE 10 MEQ: 7.46 INJECTION, SOLUTION INTRAVENOUS at 02:35

## 2025-05-05 RX ADMIN — SODIUM BICARBONATE 650 MG: 650 TABLET ORAL at 20:09

## 2025-05-05 RX ADMIN — PANTOPRAZOLE SODIUM 40 MG: 40 TABLET, DELAYED RELEASE ORAL at 05:29

## 2025-05-05 RX ADMIN — HYDRALAZINE HYDROCHLORIDE 100 MG: 50 TABLET ORAL at 08:16

## 2025-05-05 RX ADMIN — FERROUS SULFATE TAB 325 MG (65 MG ELEMENTAL FE) 325 MG: 325 (65 FE) TAB at 08:15

## 2025-05-05 RX ADMIN — ATORVASTATIN CALCIUM 80 MG: 80 TABLET, FILM COATED ORAL at 20:09

## 2025-05-05 RX ADMIN — TICAGRELOR 90 MG: 90 TABLET ORAL at 20:09

## 2025-05-05 RX ADMIN — INSULIN GLARGINE 7 UNITS: 100 INJECTION, SOLUTION SUBCUTANEOUS at 20:08

## 2025-05-05 RX ADMIN — QUETIAPINE FUMARATE 100 MG: 100 TABLET ORAL at 20:09

## 2025-05-05 RX ADMIN — SODIUM CHLORIDE, PRESERVATIVE FREE 10 ML: 5 INJECTION INTRAVENOUS at 08:16

## 2025-05-05 RX ADMIN — POTASSIUM CHLORIDE 10 MEQ: 7.46 INJECTION, SOLUTION INTRAVENOUS at 05:34

## 2025-05-05 RX ADMIN — HEPARIN SODIUM 5000 UNITS: 5000 INJECTION INTRAVENOUS; SUBCUTANEOUS at 22:18

## 2025-05-05 RX ADMIN — MONTELUKAST 10 MG: 10 TABLET, FILM COATED ORAL at 08:16

## 2025-05-05 RX ADMIN — ISOSORBIDE MONONITRATE 60 MG: 60 TABLET, EXTENDED RELEASE ORAL at 08:15

## 2025-05-05 RX ADMIN — ASPIRIN 81 MG: 81 TABLET, COATED ORAL at 08:15

## 2025-05-05 RX ADMIN — MORPHINE SULFATE 2 MG: 2 INJECTION, SOLUTION INTRAMUSCULAR; INTRAVENOUS at 23:47

## 2025-05-05 RX ADMIN — MEMANTINE 10 MG: 5 TABLET ORAL at 08:16

## 2025-05-05 RX ADMIN — HYDROXYZINE HYDROCHLORIDE 25 MG: 10 TABLET, FILM COATED ORAL at 20:09

## 2025-05-05 RX ADMIN — METOPROLOL SUCCINATE 50 MG: 50 TABLET, EXTENDED RELEASE ORAL at 20:09

## 2025-05-05 RX ADMIN — DOCUSATE SODIUM 100 MG: 100 CAPSULE, LIQUID FILLED ORAL at 08:16

## 2025-05-05 RX ADMIN — SODIUM BICARBONATE: 84 INJECTION, SOLUTION INTRAVENOUS at 17:51

## 2025-05-05 RX ADMIN — HYDRALAZINE HYDROCHLORIDE 100 MG: 50 TABLET ORAL at 20:09

## 2025-05-05 RX ADMIN — Medication 2 PUFF: at 08:41

## 2025-05-05 RX ADMIN — SODIUM BICARBONATE: 84 INJECTION, SOLUTION INTRAVENOUS at 13:46

## 2025-05-05 RX ADMIN — MORPHINE SULFATE 2 MG: 2 INJECTION, SOLUTION INTRAMUSCULAR; INTRAVENOUS at 00:46

## 2025-05-05 RX ADMIN — LEVETIRACETAM 500 MG: 500 TABLET, FILM COATED ORAL at 20:08

## 2025-05-05 RX ADMIN — AMLODIPINE BESYLATE 2.5 MG: 5 TABLET ORAL at 08:15

## 2025-05-05 ASSESSMENT — PAIN DESCRIPTION - DESCRIPTORS
DESCRIPTORS: PRESSURE
DESCRIPTORS: PRESSURE;ACHING
DESCRIPTORS: SORE

## 2025-05-05 ASSESSMENT — PAIN DESCRIPTION - LOCATION
LOCATION: CHEST;BACK
LOCATION: CHEST

## 2025-05-05 ASSESSMENT — PAIN DESCRIPTION - ORIENTATION
ORIENTATION: MID;LEFT
ORIENTATION: MID;LEFT
ORIENTATION: MID
ORIENTATION: MID

## 2025-05-05 ASSESSMENT — PAIN SCALES - GENERAL
PAINLEVEL_OUTOF10: 7
PAINLEVEL_OUTOF10: 7
PAINLEVEL_OUTOF10: 4
PAINLEVEL_OUTOF10: 0
PAINLEVEL_OUTOF10: 7
PAINLEVEL_OUTOF10: 0
PAINLEVEL_OUTOF10: 8

## 2025-05-05 ASSESSMENT — PAIN - FUNCTIONAL ASSESSMENT: PAIN_FUNCTIONAL_ASSESSMENT: ACTIVITIES ARE NOT PREVENTED

## 2025-05-05 NOTE — H&P
in her mother; High Blood Pressure in her mother; High Cholesterol in her mother; No Known Problems in her paternal grandfather; Stroke in her mother.  Soc HX:   Social History     Socioeconomic History    Marital status:     Number of children: 8   Tobacco Use    Smoking status: Some Days     Current packs/day: 0.00     Average packs/day: 0.5 packs/day for 35.0 years (17.5 ttl pk-yrs)     Types: Cigarettes     Start date: 1983     Last attempt to quit: 2018     Years since quittin.8    Smokeless tobacco: Never    Tobacco comments:     5/13/15 has not smoked since hospitalization - kh   Vaping Use    Vaping status: Never Used   Substance and Sexual Activity    Alcohol use: No     Alcohol/week: 0.0 standard drinks of alcohol    Drug use: Never    Sexual activity: Not Currently     Partners: Male     Social Drivers of Health     Financial Resource Strain: Low Risk  (2024)    Overall Financial Resource Strain (CARDIA)     Difficulty of Paying Living Expenses: Not hard at all   Food Insecurity: No Food Insecurity (2025)    Hunger Vital Sign     Worried About Running Out of Food in the Last Year: Never true     Ran Out of Food in the Last Year: Never true   Transportation Needs: No Transportation Needs (2025)    PRAPARE - Transportation     Lack of Transportation (Medical): No     Lack of Transportation (Non-Medical): No   Physical Activity: Insufficiently Active (2022)    Exercise Vital Sign     Days of Exercise per Week: 7 days     Minutes of Exercise per Session: 10 min   Stress: Not on File (2019)    Received from [facility]    Stress     Stress: 0   Social Connections: Not on File (2019)    Received from [facility]    Social Connections     Social Connections and Isolation: 0   Intimate Partner Violence: Not At Risk (2025)    Received from [facility]    Interpersonal Safety     Do you feel physically or emotionally unsafe where

## 2025-05-05 NOTE — PLAN OF CARE
Problem: Chronic Conditions and Co-morbidities  Goal: Patient's chronic conditions and co-morbidity symptoms are monitored and maintained or improved  Recent Flowsheet Documentation  Taken 5/5/2025 0808 by Sumi Guerrero RN  Care Plan - Patient's Chronic Conditions and Co-Morbidity Symptoms are Monitored and Maintained or Improved: Monitor and assess patient's chronic conditions and comorbid symptoms for stability, deterioration, or improvement     Problem: Discharge Planning  Goal: Discharge to home or other facility with appropriate resources  Recent Flowsheet Documentation  Taken 5/5/2025 0808 by Sumi Guerrero RN  Discharge to home or other facility with appropriate resources:   Identify barriers to discharge with patient and caregiver   Arrange for needed discharge resources and transportation as appropriate     Problem: Pain  Goal: Verbalizes/displays adequate comfort level or baseline comfort level  Recent Flowsheet Documentation  Taken 5/5/2025 0808 by Sumi Guerrero RN  Verbalizes/displays adequate comfort level or baseline comfort level:   Encourage patient to monitor pain and request assistance   Assess pain using appropriate pain scale   Administer analgesics based on type and severity of pain and evaluate response     Problem: Neurosensory - Adult  Goal: Achieves stable or improved neurological status  Recent Flowsheet Documentation  Taken 5/5/2025 0808 by Sumi Guerrero RN  Achieves stable or improved neurological status:   Assess for and report changes in neurological status   Initiate measures to prevent increased intracranial pressure   Maintain blood pressure and fluid volume within ordered parameters to optimize cerebral perfusion and minimize risk of hemorrhage  Goal: Achieves maximal functionality and self care  Recent Flowsheet Documentation  Taken 5/5/2025 0808 by Sumi Guerrero RN  Achieves maximal functionality and self care:   Monitor swallowing and airway patency with patient

## 2025-05-05 NOTE — PROGRESS NOTES
DORIAN Perez messaged via perfect serve about patient c/o of chest pain, PRN morphine ordered.    Electronically signed by Yoselin Knox RN on 5/5/2025 at 12:54 AM

## 2025-05-05 NOTE — PROGRESS NOTES
Medication Reconciliation    List of medications patient is currently taking is complete.     Source of information: 1. Conversation with patient over the phone                                       2. EPIC records      Notes regarding home medications:   1. Pt took all AM meds pta  2. Removed amitriptyline  3. Updated memantine and amlodipine     Barbara Brian [facility], PharmD 5/4/2025 9:19 PM

## 2025-05-05 NOTE — PROGRESS NOTES
Pt arrived to ICU via stretcher from [facility] ED Bedside report received from  transporting EMS. Pt attached to ICU Bedside Monitor. Pt alert and oriented x4. Rhythm at this time is NSR. Currently receiving D5 0.45 NS, insulin, and heparin infusions. Bed alarm active and call light within reach. Educated pt on the use of call light and importance of calling RN before attempting to get out of bed. Pt resting comfortably at this time.     Electronically signed by Yoselin Knox RN on 5/5/2025 at 12:38 AM

## 2025-05-05 NOTE — PROGRESS NOTES
Seen and examined  ECG is unremarkable   D/c heparin gtt  NPO for lexiscan tomorrow     Full consult to follow    Sean Vazquez MD FAC   General, Interventional Cardiology, and Peripheral Vascular Disease   [facility]   (O): [phone]  (F): [phone]

## 2025-05-05 NOTE — PLAN OF CARE
Problem: Chronic Conditions and Co-morbidities  Goal: Patient's chronic conditions and co-morbidity symptoms are monitored and maintained or improved  Outcome: Progressing  Flowsheets (Taken 5/4/2025 2000)  Care Plan - Patient's Chronic Conditions and Co-Morbidity Symptoms are Monitored and Maintained or Improved:   Monitor and assess patient's chronic conditions and comorbid symptoms for stability, deterioration, or improvement   Collaborate with multidisciplinary team to address chronic and comorbid conditions and prevent exacerbation or deterioration   Update acute care plan with appropriate goals if chronic or comorbid symptoms are exacerbated and prevent overall improvement and discharge     Problem: Discharge Planning  Goal: Discharge to home or other facility with appropriate resources  Outcome: Progressing  Flowsheets (Taken 5/4/2025 2000)  Discharge to home or other facility with appropriate resources:   Identify barriers to discharge with patient and caregiver   Arrange for needed discharge resources and transportation as appropriate   Arrange for interpreters to assist at discharge as needed   Refer to discharge planning if patient needs post-hospital services based on physician order or complex needs related to functional status, cognitive ability or social support system   Identify discharge learning needs (meds, wound care, etc)     Problem: Pain  Goal: Verbalizes/displays adequate comfort level or baseline comfort level  Outcome: Progressing  Flowsheets (Taken 5/4/2025 2003)  Verbalizes/displays adequate comfort level or baseline comfort level:   Encourage patient to monitor pain and request assistance   Assess pain using appropriate pain scale   Administer analgesics based on type and severity of pain and evaluate response   Implement non-pharmacological measures as appropriate and evaluate response   Notify Licensed Independent Practitioner if interventions unsuccessful or patient reports new

## 2025-05-05 NOTE — PROGRESS NOTES
Clinical Pharmacy Note  Heparin Dosing       Lab Results   Component Value Date/Time    ANTIXAUHEP 0.82 05/04/2025 09:25 PM      Lab Results   Component Value Date/Time    HGB 8.4 05/04/2025 02:21 PM    HCT 26.0 05/04/2025 02:21 PM     05/04/2025 02:21 PM    INR 1.06 12/07/2024 08:25 PM       Current Infusion Rate: 570 units/hr    Plan:  Rate: decrease to 470 units/hr  Next anti-Xa level: 5/5/25 0400    Pharmacy will continue to monitor and adjust based on anti-Xa results.    Barbara Brian RPH, PharmD 5/4/2025 10:10 PM

## 2025-05-05 NOTE — PROGRESS NOTES
V2.0  [facility] Hospitalist Progress Note      Name:  Maren Meza /Age/Sex: 1955  (69 y.o. female)   MRN & CSN:  [id number] & [id number] Encounter Date/Time: 2025 8:48 AM EDT    Location:  [id number] PCP: Binta Beard APRN - NP       Hospital Day: 2    Assessment and Plan:   Maren Meza is a 69 y.o. female with extensive cardiac history presenting with chest pain      Plan:  Unstable angina  - Patient with extensive history of coronary artery disease s/p 7 stents, most recent 2025  - Continue metoprolol, statin, aspirin, and Brilinta  -Continue Imdur.  As needed nitro for chest pain  - Heparin drip  - Cardiology consulted.  Note  reviewed: EKG is unremarkable.  DC heparin drip.  Plan for Lexiscan tomorrow  Chronic HFpEF  - Caution with fluid resuscitation  Type 2 Diabetes with hyperglycemia  - Patient not in DKA, no ketones or beta hydroxybutyrate  - Insulin doses adjusted with regards to patient's renal disease.  Lantus 7 units nightly, lispro 2 units with meals plus low-dose sliding scale insulin.  Monitor toxicity of insulin with glucose checks for hypoglycemia  - Carb controlled diet since no procedure is planned  KOLBY on CKD -resolved  - Initial creatinine 2.4.  Improved to 1.9 which is baseline  Non-anion gap metabolic acidosis  - During previous hospitalization was thought to be likely due to renal disease.  - Continuing with oral bicarb  - Nephrology consulted.  Note reviewed : Start bicarb gtt. okay to continue oral bicarbonate supplementation  Acute on chronic anemia chronic kidney disease  - No signs of bleeding  - Hemoglobin 7.8 this morning, baseline is around 9  - Check iron studies    Ppx: Heparin  Dispo: Home    Subjective:     Chief Complaint: Chest Pain (Pt into Ed for chest pain 10/10. Pt states chest pain started at 5 am. Pt took nitro then and helped with pain. Pt states pain is now coming back . Pt does have cardiac hx. Pt states left sided chest pain that

## 2025-05-05 NOTE — PROGRESS NOTES
NAME:  Maren Meza  YOB: 1955  MEDICAL RECORD NUMBER:  [id number]    Shift Summary: patient is off of insuline drip, SSI started with meal time coverage, no blood glucose levels noted above 180. Off heparin drip, to be npo after mn for  a stress test in the am    Family updated: No    Rhythm: Normal Sinus Rhythm     Most recent vitals:   Visit Vitals  BP (!) 154/62   Pulse 64   Temp 98.1 °F (36.7 °C)   Resp 12   Ht 1.524 m (5')   Wt 47.9 kg (105 lb 9.6 oz)   SpO2 100%   BMI 20.62 kg/m²           No data found.    No data found.      Respiratory support needed (if any):  - RA    Admission weight Weight - Scale: 47.7 kg (105 lb 2.6 oz) (05/04/25 1154)    Today's weight    Wt Readings from Last 1 Encounters:   05/04/25 47.9 kg (105 lb 9.6 oz)        Gill need assessed each shift: N/A - no gill present  UOP >30ml/hr:   Last documented BM (in last 48 hrs):  No data found.             Restraints (in use currently or dc'd in last 12 hrs): No    Order current and documentation up to date? No    Lines/Drains reviewed @ bedside.  Peripheral IV 05/05/25 Right Cephalic (Active)   Number of days: 0         Drip rates at handoff:    sodium bicarbonate 150 mEq in sterile water 1,000 mL infusion 75 mL/hr at 05/05/25 1834    dextrose         Lab Data:   CBC:   Recent Labs     05/04/25  1421 05/05/25  0408   WBC 6.7 8.3   HGB 8.4* 7.8*   HCT 26.0* 23.8*   MCV 97.4 95.1    146     BMP:    Recent Labs     05/05/25  0408 05/05/25  0818    141   K 4.7 4.8   CO2 15* 19*   BUN 46* 43*   CREATININE 1.9* 1.8*     ABG: No results for input(s): \"PHART\", \"RHR1QQO\", \"PO2ART\" in the last 72 hours.    Any consults during the shift? No    Any signed and held orders to be released?  No        4 Eyes Skin Assessment       The patient is being assessed for  Admission    I agree that at least one RN has performed a thorough Head to Toe Skin Assessment on the patient. ALL assessment sites listed below have been

## 2025-05-05 NOTE — PROGRESS NOTES
NAME:  Maren Meza  YOB: 1955  MEDICAL RECORD NUMBER:  [id number]    Shift Summary: To ED c/o CP radiates to L arm x1 day, took nitro with relief, HTN in ED, Troponins elevated, heparin gtt, BG 400s, Gap 18, CO2 13, pH 7.27, DKA protocol, gap resolved, but CO2 still low, continue insulin gtt, PRN morphine for CP, nephro and cardiology c/s, CO2 only improved slightly, x2 amps bicarb, replaced K  Patient alert and oriented x4, VSS, SBA to bathroom    Family updated: No    Rhythm: Normal Sinus Rhythm     Most recent vitals:   Visit Vitals  BP (!) 152/61   Pulse 59   Temp 97.7 °F (36.5 °C) (Oral)   Resp 14   Ht 1.524 m (5')   Wt 47.9 kg (105 lb 9.6 oz)   SpO2 100%   BMI 20.62 kg/m²           No data found.    No data found.      Respiratory support needed (if any):  - RA    Admission weight Weight - Scale: 47.7 kg (105 lb 2.6 oz) (05/04/25 1154)    Today's weight    Wt Readings from Last 1 Encounters:   05/04/25 47.9 kg (105 lb 9.6 oz)        Gill need assessed each shift: N/A - no gill present  UOP >30ml/hr: YES  Last documented BM (in last 48 hrs):  No data found.             Restraints (in use currently or dc'd in last 12 hrs): No    Order current and documentation up to date? No    Lines/Drains reviewed @ bedside.  CVC  05/04/25 Right Femoral (Active)   Number of days: 0         Drip rates at handoff:    heparin (PORCINE) Infusion 470 Units/hr (05/05/25 0035)    dextrose      dextrose 5 % and 0.45 % NaCl 150 mL/hr at 05/05/25 0035    sodium chloride      insulin 2.1 Units/hr (05/05/25 0027)       Lab Data:   CBC:   Recent Labs     05/04/25  1421   WBC 6.7   HGB 8.4*   HCT 26.0*   MCV 97.4        BMP:    Recent Labs     05/04/25  1421 05/04/25 1958   * 137   K 4.3 4.6   CO2 13* 14*   BUN 63* 53*   CREATININE 2.4* 2.0*     ABG: No results for input(s): \"PHART\", \"JHR6RJY\", \"PO2ART\" in the last 72 hours.    Any consults during the shift? Cardiology and nephrology    Any signed and

## 2025-05-05 NOTE — PROGRESS NOTES
Clinical Pharmacy Note  Renal Dose Adjustment    The electrolyte replacement protocol for potassium/magnesium has been discontinued per P&T guidelines because the patient has reduced renal function (CrCl < 30 mL/min). For patients with decreased renal function needing potassium or magnesium supplementation, please order individual doses with appropriate monitoring. Please contact the pharmacy with any concerns.      Barbara Brian RPH, PharmD 5/4/2025 9:16 PM

## 2025-05-05 NOTE — PROGRESS NOTES
4 Eyes Skin Assessment     NAME:  Maren Meza  YOB: 1955  MEDICAL RECORD NUMBER:  [id number]    The patient is being assessed for  Admission    I agree that at least one RN has performed a thorough Head to Toe Skin Assessment on the patient. ALL assessment sites listed below have been assessed.      Areas assessed by both nurses:    Head, Face, Ears, Shoulders, Back, Chest, Arms, Elbows, Hands, Sacrum. Buttock, Coccyx, Ischium, Legs. Feet and Heels, and Under Medical Devices         Does the Patient have a Wound? No noted wound(s)       Rakan Prevention initiated by RN: No  Wound Care Orders initiated by RN: No    Pressure Injury (Stage 3,4, Unstageable, DTI, NWPT, and Complex wounds) if present, place Wound referral order by RN under : No    New Ostomies, if present place, Ostomy referral order under : No     Nurse 1 eSignature: Electronically signed by Yoselin Knox RN on 5/5/25 at 12:36 AM EDT    **SHARE this note so that the co-signing nurse can place an eSignature**    Nurse 2 eSignature: Electronically signed by Carlota Hare RN on 5/5/25 at 12:36 AM EDT

## 2025-05-05 NOTE — PROGRESS NOTES
[facility]  Diabetes Education   Progress Note       NAME:  Maren Meza  MEDICAL RECORD NUMBER:  [id number]  AGE: 69 y.o.   GENDER: female  : 1955  TODAY'S DATE:  2025    Subjective   Reason for Diabetes Education Evaluation and Assessment: DKA/HHS    Visit Type: evaluation      Maren Meza is a 69 y.o. female referred by:  [x] Physician    Chart reviewed. Per home medication list, pt was prescribed the following DM medications PTA: Novolog insulin pen- inject 4 units into the skin 3 times daily (with meals)- per pt her PCP took her off her meal time insulin, Lantus insulin pen- inject 10 units into the skin nightly, Januvia 50 mg daily, and Farxiga 10 mg take one tablet every morning. ,     Met with pt. Pt stated two days after she was \"discharged a while back\", my doctor took me off the insulin and put me on Farxiga and some other medication. I was started on Prednisone 20mg in the morning and 20 mg in the evening and my doctor saw my sugars going up, and restarted my insulin the Basaglar 10 units every night. Pt stated she checks her glucoses 2-3 times a day and they ranged the week prior to admission 180-500. Pt stated she didn't follow up with her CM at [facility] to ask about increasing her meals due to \"too much going on\". Pt stated she now has an aid 5 hours a day, 5 days a week. Pt stated she last spoke with her CM about a month ago. Pt stated she has limited family support due to her sons work schedule and her niece moving away.     PAST MEDICAL HISTORY        Diagnosis Date    Acid reflux     Anemia     Anxiety and depression     Arthritis     Asthma     Atrial fibrillation (HCC)     CAD (coronary artery disease) 12/3/2012    Cerebral artery occlusion with cerebral infarction ([facility])     TIA\"\"S--right sided weakness & headache    CHF (congestive heart failure) ([facility])     Chronic kidney disease--stage III     40% kidney function    COPD (chronic obstructive pulmonary disease) ([facility])

## 2025-05-05 NOTE — PROGRESS NOTES
Clinical Pharmacy Note  Heparin Dosing       Lab Results   Component Value Date/Time    ANTIXAUHEP 0.58 05/05/2025 04:08 AM      Lab Results   Component Value Date/Time    HGB 7.8 05/05/2025 04:08 AM    HCT 23.8 05/05/2025 04:08 AM     05/05/2025 04:08 AM    INR 1.06 12/07/2024 08:25 PM       Current Infusion Rate: 470 units/hr    Plan:  Rate: continue at 470 units/hr  Next anti-Xa level: 1100 5/5/25    Pharmacy will continue to monitor and adjust based on anti-Xa results.  Johny Kitchen, PharmD

## 2025-05-05 NOTE — DISCHARGE INSTRUCTIONS
American Diabetes Association:     About Diabetes: https://diabetes.org/about-diabetes     Life with Diabetes: https://diabetes.org/living-with-diabetes     Health & Wellness: https://diabetes.org/health-wellness     Food & Nutrition: https://diabetes.org/food-nutrition     Tools & Resources: https://diabetes.org/tools-resources  _________________________________________________    Alem Sensor Replacement [phone]     Call for a replacement if your sensor falls off, if you have testing that requires for sensor to be removed, or prior to procedures including Diathermy.     Customer service will verify your name and date of birth, address, and verify you have an active order. Replacements take 3-5 business days to receive in the mail.   _________________________________________________    - Ask your Doctor how you should take your insulin if you are to restart Steroids (Prednisone)    - Ask your Doctor how to treat ketones when you check and have ketones at home (fluids? Fast acting insulin?)    - Ask your Doctor how often you should have your labs checked to monitor your renal function (safe use of Farxiga and Januvia)  _________________________________________________    Extra Heart Failure Education/ Tools/ Resources:     https://Visiprise.com/publication/?t=228492   --- this is American Heart Association interactive Healthier Living with Heart Failure guidebook.  Please click hyperlink or copy / paste link into search bar. The QR Code is also available below. Use your mouse to scroll through the pages.  Lots of information about weight monitoring, diet tips, activity, meds, etc    Heart Failure Tools and Resources QR Code is below. It includes multiple resources to include symptom tracker, med tracker, further HF info, and access to a HF Support Network online Community    HF [place]  -- this is a free smart phone forrest available for iPhone and Android download.  Use your phone to track sodium /

## 2025-05-06 ENCOUNTER — APPOINTMENT (OUTPATIENT)
Age: 70
DRG: 303 | End: 2025-05-06
Payer: COMMERCIAL

## 2025-05-06 ENCOUNTER — APPOINTMENT (OUTPATIENT)
Dept: NUCLEAR MEDICINE | Age: 70
DRG: 303 | End: 2025-05-06
Payer: COMMERCIAL

## 2025-05-06 VITALS
WEIGHT: 103.4 LBS | OXYGEN SATURATION: 100 % | DIASTOLIC BLOOD PRESSURE: 65 MMHG | HEART RATE: 71 BPM | RESPIRATION RATE: 16 BRPM | BODY MASS INDEX: 20.3 KG/M2 | TEMPERATURE: 97.6 F | SYSTOLIC BLOOD PRESSURE: 129 MMHG | HEIGHT: 60 IN

## 2025-05-06 LAB
ALBUMIN SERPL-MCNC: 3.2 G/DL (ref 3.4–5)
ANION GAP SERPL CALCULATED.3IONS-SCNC: 10 MMOL/L (ref 3–16)
BASOPHILS # BLD: 0 K/UL (ref 0–0.2)
BASOPHILS NFR BLD: 0.2 %
BUN SERPL-MCNC: 33 MG/DL (ref 7–20)
CALCIUM SERPL-MCNC: 8.6 MG/DL (ref 8.3–10.6)
CHLORIDE SERPL-SCNC: 109 MMOL/L (ref 99–110)
CO2 SERPL-SCNC: 22 MMOL/L (ref 21–32)
CREAT SERPL-MCNC: 1.8 MG/DL (ref 0.6–1.2)
DEPRECATED RDW RBC AUTO: 15.7 % (ref 12.4–15.4)
ECHO BSA: 1.42 M2
EOSINOPHIL # BLD: 0.2 K/UL (ref 0–0.6)
EOSINOPHIL NFR BLD: 2.5 %
FERRITIN SERPL IA-MCNC: 180 NG/ML (ref 15–150)
FOLATE SERPL-MCNC: 8.18 NG/ML (ref 4.78–24.2)
GFR SERPLBLD CREATININE-BSD FMLA CKD-EPI: 30 ML/MIN/{1.73_M2}
GLUCOSE BLD-MCNC: 130 MG/DL (ref 70–99)
GLUCOSE BLD-MCNC: 136 MG/DL (ref 70–99)
GLUCOSE BLD-MCNC: 172 MG/DL (ref 70–99)
GLUCOSE BLD-MCNC: 192 MG/DL (ref 70–99)
GLUCOSE SERPL-MCNC: 137 MG/DL (ref 70–99)
HCT VFR BLD AUTO: 23.8 % (ref 36–48)
HGB BLD-MCNC: 7.7 G/DL (ref 12–16)
IRON SATN MFR SERPL: 17 % (ref 15–50)
IRON SERPL-MCNC: 32 UG/DL (ref 37–145)
LYMPHOCYTES # BLD: 1.2 K/UL (ref 1–5.1)
LYMPHOCYTES NFR BLD: 19.6 %
MAGNESIUM SERPL-MCNC: 2.04 MG/DL (ref 1.8–2.4)
MCH RBC QN AUTO: 30.9 PG (ref 26–34)
MCHC RBC AUTO-ENTMCNC: 32.4 G/DL (ref 31–36)
MCV RBC AUTO: 95.4 FL (ref 80–100)
MONOCYTES # BLD: 0.4 K/UL (ref 0–1.3)
MONOCYTES NFR BLD: 6.4 %
NEUTROPHILS # BLD: 4.3 K/UL (ref 1.7–7.7)
NEUTROPHILS NFR BLD: 71.3 %
NUC REST DIASTOLIC VOLUME INDEX: 63 ML/M2
NUC REST EJECTION FRACTION: 81 %
NUC REST SYSTOLIC VOLUME INDEX: 12 ML/M2
NUC STRESS EJECTION FRACTION: 81 %
PERFORMED ON: ABNORMAL
PHOSPHATE SERPL-MCNC: 3.5 MG/DL (ref 2.5–4.9)
PLATELET # BLD AUTO: 141 K/UL (ref 135–450)
PMV BLD AUTO: 8.8 FL (ref 5–10.5)
POTASSIUM SERPL-SCNC: 4.5 MMOL/L (ref 3.5–5.1)
RBC # BLD AUTO: 2.5 M/UL (ref 4–5.2)
SODIUM SERPL-SCNC: 141 MMOL/L (ref 136–145)
STRESS BASELINE DIAS BP: 63 MMHG
STRESS BASELINE HR: 63 BPM
STRESS BASELINE SYS BP: 139 MMHG
STRESS ESTIMATED WORKLOAD: 1 METS
STRESS EXERCISE DUR MIN: 1 MIN
STRESS EXERCISE DUR SEC: 40 SEC
STRESS O2 SAT PEAK: 100 %
STRESS O2 SAT REST: 100 %
STRESS PEAK DIAS BP: 51 MMHG
STRESS PEAK SYS BP: 189 MMHG
STRESS PERCENT HR ACHIEVED: 55 %
STRESS POST PEAK HR: 84 BPM
STRESS RATE PRESSURE PRODUCT: NORMAL BPM*MMHG
STRESS TARGET HR: 152 BPM
TIBC SERPL-MCNC: 190 UG/DL (ref 260–445)
TRANSFERRIN SERPL-MCNC: 160 MG/DL (ref 200–360)
VIT B12 SERPL-MCNC: 890 PG/ML (ref 211–911)
WBC # BLD AUTO: 6.1 K/UL (ref 4–11)

## 2025-05-06 PROCEDURE — A9502 TC99M TETROFOSMIN: HCPCS | Performed by: INTERNAL MEDICINE

## 2025-05-06 PROCEDURE — 94640 AIRWAY INHALATION TREATMENT: CPT

## 2025-05-06 PROCEDURE — 2500000003 HC RX 250 WO HCPCS: Performed by: STUDENT IN AN ORGANIZED HEALTH CARE EDUCATION/TRAINING PROGRAM

## 2025-05-06 PROCEDURE — 6370000000 HC RX 637 (ALT 250 FOR IP): Performed by: STUDENT IN AN ORGANIZED HEALTH CARE EDUCATION/TRAINING PROGRAM

## 2025-05-06 PROCEDURE — 6370000000 HC RX 637 (ALT 250 FOR IP)

## 2025-05-06 PROCEDURE — 6360000002 HC RX W HCPCS: Performed by: INTERNAL MEDICINE

## 2025-05-06 PROCEDURE — 78452 HT MUSCLE IMAGE SPECT MULT: CPT

## 2025-05-06 PROCEDURE — 78452 HT MUSCLE IMAGE SPECT MULT: CPT | Performed by: STUDENT IN AN ORGANIZED HEALTH CARE EDUCATION/TRAINING PROGRAM

## 2025-05-06 PROCEDURE — 85025 COMPLETE CBC W/AUTO DIFF WBC: CPT

## 2025-05-06 PROCEDURE — 82607 VITAMIN B-12: CPT

## 2025-05-06 PROCEDURE — 82728 ASSAY OF FERRITIN: CPT

## 2025-05-06 PROCEDURE — 3430000000 HC RX DIAGNOSTIC RADIOPHARMACEUTICAL: Performed by: INTERNAL MEDICINE

## 2025-05-06 PROCEDURE — 93018 CV STRESS TEST I&R ONLY: CPT | Performed by: STUDENT IN AN ORGANIZED HEALTH CARE EDUCATION/TRAINING PROGRAM

## 2025-05-06 PROCEDURE — 93016 CV STRESS TEST SUPVJ ONLY: CPT | Performed by: STUDENT IN AN ORGANIZED HEALTH CARE EDUCATION/TRAINING PROGRAM

## 2025-05-06 PROCEDURE — 93017 CV STRESS TEST TRACING ONLY: CPT

## 2025-05-06 PROCEDURE — 83540 ASSAY OF IRON: CPT

## 2025-05-06 PROCEDURE — 84466 ASSAY OF TRANSFERRIN: CPT

## 2025-05-06 PROCEDURE — 36415 COLL VENOUS BLD VENIPUNCTURE: CPT

## 2025-05-06 PROCEDURE — 6360000002 HC RX W HCPCS

## 2025-05-06 PROCEDURE — 82746 ASSAY OF FOLIC ACID SERUM: CPT

## 2025-05-06 PROCEDURE — 99232 SBSQ HOSP IP/OBS MODERATE 35: CPT | Performed by: NURSE PRACTITIONER

## 2025-05-06 PROCEDURE — 6370000000 HC RX 637 (ALT 250 FOR IP): Performed by: NURSE PRACTITIONER

## 2025-05-06 PROCEDURE — 83735 ASSAY OF MAGNESIUM: CPT

## 2025-05-06 PROCEDURE — 94760 N-INVAS EAR/PLS OXIMETRY 1: CPT

## 2025-05-06 PROCEDURE — 80069 RENAL FUNCTION PANEL: CPT

## 2025-05-06 PROCEDURE — 6360000002 HC RX W HCPCS: Performed by: STUDENT IN AN ORGANIZED HEALTH CARE EDUCATION/TRAINING PROGRAM

## 2025-05-06 RX ORDER — KETOROLAC TROMETHAMINE 30 MG/ML
INJECTION, SOLUTION INTRAMUSCULAR; INTRAVENOUS
Qty: 1 EACH | Refills: 0 | Status: SHIPPED | OUTPATIENT
Start: 2025-05-06

## 2025-05-06 RX ORDER — HYDROCHLOROTHIAZIDE 12.5 MG/1
CAPSULE ORAL
Qty: 2 EACH | Refills: 3 | Status: SHIPPED | OUTPATIENT
Start: 2025-05-06

## 2025-05-06 RX ORDER — TRAMADOL HYDROCHLORIDE 50 MG/1
50 TABLET ORAL EVERY 6 HOURS PRN
Status: DISCONTINUED | OUTPATIENT
Start: 2025-05-06 | End: 2025-05-06 | Stop reason: HOSPADM

## 2025-05-06 RX ORDER — REGADENOSON 0.08 MG/ML
0.4 INJECTION, SOLUTION INTRAVENOUS
Status: COMPLETED | OUTPATIENT
Start: 2025-05-06 | End: 2025-05-06

## 2025-05-06 RX ADMIN — MEMANTINE 10 MG: 5 TABLET ORAL at 08:15

## 2025-05-06 RX ADMIN — LEVETIRACETAM 500 MG: 500 TABLET, FILM COATED ORAL at 08:15

## 2025-05-06 RX ADMIN — LAMOTRIGINE 25 MG: 25 TABLET ORAL at 08:14

## 2025-05-06 RX ADMIN — AMLODIPINE BESYLATE 2.5 MG: 5 TABLET ORAL at 08:14

## 2025-05-06 RX ADMIN — REGADENOSON 0.4 MG: 0.08 INJECTION, SOLUTION INTRAVENOUS at 09:00

## 2025-05-06 RX ADMIN — HEPARIN SODIUM 5000 UNITS: 5000 INJECTION INTRAVENOUS; SUBCUTANEOUS at 05:42

## 2025-05-06 RX ADMIN — PANTOPRAZOLE SODIUM 40 MG: 40 TABLET, DELAYED RELEASE ORAL at 05:42

## 2025-05-06 RX ADMIN — SODIUM BICARBONATE 650 MG: 650 TABLET ORAL at 08:15

## 2025-05-06 RX ADMIN — TETROFOSMIN 13.2 MILLICURIE: 1.38 INJECTION, POWDER, LYOPHILIZED, FOR SOLUTION INTRAVENOUS at 07:16

## 2025-05-06 RX ADMIN — Medication 2 PUFF: at 11:11

## 2025-05-06 RX ADMIN — SODIUM BICARBONATE 650 MG: 650 TABLET ORAL at 14:31

## 2025-05-06 RX ADMIN — HYDRALAZINE HYDROCHLORIDE 100 MG: 50 TABLET ORAL at 14:31

## 2025-05-06 RX ADMIN — ISOSORBIDE MONONITRATE 60 MG: 60 TABLET, EXTENDED RELEASE ORAL at 08:15

## 2025-05-06 RX ADMIN — FERROUS SULFATE TAB 325 MG (65 MG ELEMENTAL FE) 325 MG: 325 (65 FE) TAB at 08:14

## 2025-05-06 RX ADMIN — MORPHINE SULFATE 2 MG: 2 INJECTION, SOLUTION INTRAMUSCULAR; INTRAVENOUS at 08:26

## 2025-05-06 RX ADMIN — MONTELUKAST 10 MG: 10 TABLET, FILM COATED ORAL at 08:15

## 2025-05-06 RX ADMIN — HYDRALAZINE HYDROCHLORIDE 100 MG: 50 TABLET ORAL at 08:15

## 2025-05-06 RX ADMIN — METOPROLOL SUCCINATE 50 MG: 50 TABLET, EXTENDED RELEASE ORAL at 08:15

## 2025-05-06 RX ADMIN — ASPIRIN 81 MG: 81 TABLET, COATED ORAL at 08:14

## 2025-05-06 RX ADMIN — MORPHINE SULFATE 2 MG: 2 INJECTION, SOLUTION INTRAMUSCULAR; INTRAVENOUS at 04:18

## 2025-05-06 RX ADMIN — TICAGRELOR 90 MG: 90 TABLET ORAL at 08:14

## 2025-05-06 RX ADMIN — INSULIN LISPRO 2 UNITS: 100 INJECTION, SOLUTION INTRAVENOUS; SUBCUTANEOUS at 12:35

## 2025-05-06 RX ADMIN — INSULIN LISPRO 1 UNITS: 100 INJECTION, SOLUTION INTRAVENOUS; SUBCUTANEOUS at 15:01

## 2025-05-06 RX ADMIN — SODIUM CHLORIDE, PRESERVATIVE FREE 10 ML: 5 INJECTION INTRAVENOUS at 10:25

## 2025-05-06 RX ADMIN — IRON SUCROSE 200 MG: 20 INJECTION, SOLUTION INTRAVENOUS at 10:29

## 2025-05-06 RX ADMIN — TETROFOSMIN 32.7 MILLICURIE: 1.38 INJECTION, POWDER, LYOPHILIZED, FOR SOLUTION INTRAVENOUS at 09:06

## 2025-05-06 ASSESSMENT — PAIN SCALES - GENERAL
PAINLEVEL_OUTOF10: 0
PAINLEVEL_OUTOF10: 7
PAINLEVEL_OUTOF10: 7

## 2025-05-06 ASSESSMENT — PAIN DESCRIPTION - ORIENTATION
ORIENTATION: LEFT;MID
ORIENTATION: MID

## 2025-05-06 ASSESSMENT — PAIN DESCRIPTION - LOCATION
LOCATION: CHEST
LOCATION: CHEST

## 2025-05-06 ASSESSMENT — PAIN DESCRIPTION - DESCRIPTORS
DESCRIPTORS: PRESSURE;HEAVINESS
DESCRIPTORS: PRESSURE

## 2025-05-06 ASSESSMENT — PAIN - FUNCTIONAL ASSESSMENT: PAIN_FUNCTIONAL_ASSESSMENT: ACTIVITIES ARE NOT PREVENTED

## 2025-05-06 NOTE — PROGRESS NOTES
[facility]   Daily Progress Note      Admit Date:  5/4/2025    CC: chest pain    HPI:   Maren Meza is a 69 y.o. female with PMH CAD/multiple PCIs/ , AF/Watchman, DM, HTN,HLP, CKD, COPD/tobacco abuse.    Presented to [facility] with non exertional chest pain.  Elevated HS trop. Elevated BNP.    She has had intermittent mid sternal and L side chest pressure throughout the night that wakes her up. Mild SOB.  Improves with morphine.     BS elevated on admission 439.     Hgb 7.7 this AM- pt denies any dark stools or obvious bleeding.   Denies hx GIB, says she has had GI work up in the past.    Review of Systems:   General: Denies fever, chills, fatigue, weakness  RESP: Denies cough, sputum, dyspnea, wheeze, snoring  CARD: Denies palpitations,  murmur  GI:Denies nausea, vomiting, heartburn, loss of appetite, change in bowels  VASC: Denies claudication, leg cramps  NEURO: Denies numbness, tingling, weakness,change in mood or memory  HEME: Denies abn bruising, bleeding, anemia    Objective:   BP (!) 141/57   Pulse 68   Temp 98.4 °F (36.9 °C) (Oral)   Resp 16   Ht 1.524 m (5')   Wt 46.9 kg (103 lb 6.3 oz)   SpO2 100%   BMI 20.19 kg/m²         Intake/Output Summary (Last 24 hours) at 5/6/2025 0732  Last data filed at 5/6/2025 0705  Gross per 24 hour   Intake 1788.87 ml   Output 1750 ml   Net 38.87 ml     I/O since adm:     WEIGHT:Admit Weight - Scale: 47.7 kg (105 lb 2.6 oz)         Today  Weight - Scale: 46.9 kg (103 lb 6.3 oz)   DRY WEIGHT:  Wt Readings from Last 3 Encounters:   05/06/25 46.9 kg (103 lb 6.3 oz)   04/17/25 48.1 kg (106 lb 0.7 oz)   01/16/25 48.1 kg (106 lb 0.7 oz)       Physical Exam:  GEN: Appears well, no acute distress  SKIN: Brown, warm, dry.   LUNG: AP diameter normal. Diminished bilateral.  Respiratory effort normal.  HEART: S1S2 A/R. No JVD. No carotid bruit. No murmur, rub or gallop.  ABD: Soft, nontender. +BS X 4 quads. No hepatomegaly.   EXT: Radial and pedal pulses 2+ and

## 2025-05-06 NOTE — PROGRESS NOTES
CLINICAL PHARMACY NOTE: MEDS TO BEDS    Total # of Prescriptions Filled: 3   The following medications were delivered to the patient:  Current Discharge Medication List        START taking these medications    Details   Continuous Glucose Sensor (FREESTYLE LISSETT 3 PLUS SENSOR) MISC Use as directed to monitor blood glucoses  Qty: 2 each, Refills: 3    Comments: Hypoglycemia unawareness, A1c 9.6  Associated Diagnoses: Type 2 diabetes mellitus with both eyes affected by mild nonproliferative retinopathy without macular edema, with long-term current use of insulin ([facility]); Type 2 diabetes mellitus with chronic kidney disease, with long-term current use of insulin, unspecified CKD stage ([facility])      Continuous Glucose  (FREESTYLE LISSETT 3 READER) DAYO Use as directed to monitor glucoses  Qty: 1 each, Refills: 0    Comments: Hypoglycemia unawareness, A1c 9.6  Associated Diagnoses: Type 2 diabetes mellitus with both eyes affected by mild nonproliferative retinopathy without macular edema, with long-term current use of insulin ([facility]); Type 2 diabetes mellitus with chronic kidney disease, with long-term current use of insulin, unspecified CKD stage ([facility])      Urine Glucose-Ketones Test STRP Use as needed to check for urine ketones  Qty: 100 strip, Refills: 0               Additional Documentation:

## 2025-05-06 NOTE — DISCHARGE SUMMARY
distress.  Cardiovascular:      Rate and Rhythm: Normal rate and regular rhythm.      Heart sounds: No murmur heard.  Pulmonary:      Effort: Pulmonary effort is normal.      Breath sounds: No wheezing, rhonchi or rales.   Abdominal:      General: There is no distension.      Palpations: Abdomen is soft.      Tenderness: There is no abdominal tenderness.   Musculoskeletal:      Right lower leg: No edema.      Left lower leg: No edema.   Neurological:      Mental Status: She is alert.        Labs and Imaging   Nuclear stress test with myocardial perfusion  Result Date: 5/6/2025    ECG: The stress ECG was not diagnostic due to pharmacologic protocol.   Perfusion Comments: LV perfusion is normal. There is no evidence of inducible ischemia.   Stress Function: Left ventricular function post-stress is normal. Post-stress ejection fraction is 81%.   Stress Combined Conclusion: Findings suggest a low risk of cardiac events.     XR CHEST PORTABLE  Result Date: 5/4/2025  EXAMINATION: ONE XRAY VIEW OF THE CHEST 5/4/2025 11:57 am COMPARISON: 01/14/2025 HISTORY: ORDERING SYSTEM PROVIDED HISTORY: Chest Pain TECHNOLOGIST PROVIDED HISTORY: \"IF\" patient is hemodynamically unstable. Indication: Chest Pain Reason for exam:->Chest Pain Reason for Exam: chest pain and goes down left arm FINDINGS: The lungs are without acute focal process.  There is no effusion or pneumothorax. The cardiomediastinal silhouette is stable. The osseous structures are stable.     No acute process.       CBC:   Recent Labs     05/04/25  1421 05/05/25  0408 05/06/25  0702   WBC 6.7 8.3 6.1   HGB 8.4* 7.8* 7.7*    146 141     BMP:    Recent Labs     05/05/25  0408 05/05/25  0818 05/06/25  0702    141 141   K 4.7 4.8 4.5   * 114* 109   CO2 15* 19* 22   BUN 46* 43* 33*   CREATININE 1.9* 1.8* 1.8*   GLUCOSE 140* 135* 137*     Hepatic:   Recent Labs     05/04/25  1421   AST 37   ALT 71*   BILITOT 0.3   ALKPHOS 88     Lipids:   Lab Results

## 2025-05-06 NOTE — PROGRESS NOTES
NAME:  Maren Meza  YOB: 1955  MEDICAL RECORD NUMBER:  [id number]    Shift Summary: Patient remains alert and oriented, VSS, RA, SBA to bathroom, NPO after midnight for stress test, PRN morphing given for chest pain/discomfort.    Family updated: Yes:  by patient    Rhythm: Normal Sinus Rhythm     Most recent vitals:   Visit Vitals  BP (!) 155/63   Pulse 61   Temp 98.4 °F (36.9 °C) (Oral)   Resp 14   Ht 1.524 m (5')   Wt 47.9 kg (105 lb 9.6 oz)   SpO2 100%   BMI 20.62 kg/m²           No data found.    No data found.      Respiratory support needed (if any):  - RA    Admission weight Weight - Scale: 47.7 kg (105 lb 2.6 oz) (05/04/25 1154)    Today's weight    Wt Readings from Last 1 Encounters:   05/04/25 47.9 kg (105 lb 9.6 oz)        Gill need assessed each shift: N/A - no gill present  UOP >30ml/hr: YES  Last documented BM (in last 48 hrs):  No data found.             Restraints (in use currently or dc'd in last 12 hrs): No    Order current and documentation up to date? No    Lines/Drains reviewed @ bedside.  Peripheral IV 05/05/25 Left;Anterior Cephalic (Active)   Number of days: 0         Drip rates at handoff:    sodium bicarbonate 150 mEq in sterile water 1,000 mL infusion 75 mL/hr at 05/06/25 0026    dextrose         Lab Data:   CBC:   Recent Labs     05/04/25  1421 05/05/25  0408   WBC 6.7 8.3   HGB 8.4* 7.8*   HCT 26.0* 23.8*   MCV 97.4 95.1    146     BMP:    Recent Labs     05/05/25  0408 05/05/25  0818    141   K 4.7 4.8   CO2 15* 19*   BUN 46* 43*   CREATININE 1.9* 1.8*     ABG: No results for input(s): \"PHART\", \"TLT1SJT\", \"PO2ART\" in the last 72 hours.    Any consults during the shift? No    Any signed and held orders to be released?  No        4 Eyes Skin Assessment       The patient is being assessed for  Shift Handoff    I agree that at least one RN has performed a thorough Head to Toe Skin Assessment on the patient. ALL assessment sites listed below have been

## 2025-05-06 NOTE — PROGRESS NOTES
Nephrology Progress Note   [facility]      Reason for consultation: KOLBY on CKD 3b/4 / Metabolic acidosis -- baseline Cr ~ 1.7-2.0 mg/dL. Follows with Dr. Chung in office (last seen 11/2023).      History of Present Illness: Maren Meza is a 70 yo female with a PMHx of CKD 3b/4, h/o KOLBY, HTN, afib s/p watchman, CAD, HFpEF, COPD, DM2, HLD, temporal arteritis. Patient presents to  ED on 5/4/2025 with complaints of chest pain. States the chest pain woke her up from sleep. She took nitro and experienced partial relief in pain. States she was instructed by her cardiology office to come to the hospital. Patient was placed on a heparin gtt and transferred to the ICU. There was also concern for DKA and she was placed on an insulin gtt.     We have been consulted for KOLBY on CKD 3b/4 and metabolic acidosis management. Cr was initially 2.4 mg/dL upon admission. Baseline is ~ 1.7-2.0 mg/dL. She received IVF and Cr has improved to 1.8 mg/dL. Bicarb was 13 with an AG of 18. There was concern for DKA but beta-hydroxy was 0.05.  BUN was higher than normal for her at 63 and may have been aiding in elevated AG. She was noted to be acidotic her last admission and was started on bicarb tablets 650 mg TID. Takes torsemide 10 mg qod. States she has been in a normal state of health other than the chest pain. Denies  symptoms. Looks dry on exam.     Subjective:    The patient has been seen and examined. Labs and chart reviewed. S/p stress test this AM. Cr is stable and within baseline. Bicarb has normalized. Wants to go home.     There were not complications overnight.    Patient review of systems: denies SOB. Some CP.       Allergies:  Allergies   Allergen Reactions    Bee Venom Shortness Of Breath        Scheduled Meds:   iron sucrose  200 mg IntraVENous Q24H    insulin lispro  0-4 Units SubCUTAneous Q4H    insulin lispro  2 Units SubCUTAneous TID WC    insulin glargine  0.15 Units/kg SubCUTAneous Nightly    heparin (porcine)

## 2025-05-06 NOTE — CARE COORDINATION
Case Management Assessment  Initial Evaluation    Date/Time of Evaluation: 5/6/2025 3:09 PM  Assessment Completed by: Salvador Herrera RN    If patient is discharged prior to next notation, then this note serves as note for discharge by case management.    Patient Name: Maren Meza                   YOB: 1955  Diagnosis: Unstable angina (HCC) [I20.0]  Hyponatremia [E87.1]  Elevated troponin [R79.89]  Diabetic ketoacidosis without coma associated with type 2 diabetes mellitus (HCC) [E11.10]  Acute kidney injury superimposed on chronic kidney disease [N17.9, N18.9]  Chest pain, unspecified type [R07.9]                   Date / Time: 5/4/2025 11:44 AM    Patient Admission Status: Inpatient   Readmission Risk (Low < 19, Mod (19-27), High > 27): Readmission Risk Score: 26.3    Current PCP: Binta Beard APRN - NP  PCP verified by CM? Yes    Chart Reviewed: Yes      History Provided by: Patient  Patient Orientation: Alert and Oriented    Patient Cognition: Alert    Hospitalization in the last 30 days (Readmission):  Yes    If yes, Readmission Assessment in CM Navigator will be completed.    Advance Directives:      Code Status: Full Code   Patient's Primary Decision Maker is: Legal Next of Kin    Primary Decision Maker: Neida Fisher - Child - [phone]    Secondary Decision Maker: Ester Jenkins - Child - [phone]    Secondary Decision Maker: Nilesh Meza - Child - [phone]    Discharge Planning:    Patient lives with: Alone Type of Home: Apartment  Primary Care Giver: Self  Patient Support Systems include: Children   Current Financial resources: Medicare, Medicaid  Current community resources: ECF/Home Care  Current services prior to admission: Home Care, Durable Medical Equipment            Current DME: Walker, Cane, Other (Comment) (Raised Toilet Seat)            Type of Home Care services:  PT, Nursing Services    ADLS  Prior functional level: Independent in ADLs/IADLs  Current  89 yo M PMH COPD (not O2 dependent), CAD s/p stent 2015, AAA repair with stent, HTN, HLD, Chronic systolic HF (EF 20%), GERD, hypothyroidism who presents for syncope, ANA and SOB in the setting of COPD exacerbation vs CHF exacerbation.

## 2025-05-06 NOTE — CONSULTS
Interventional Cardiology Consultation     Maren Susana  1955    PCP: Binta Beard APRN - NP  Referring Physician: Dr. Herrera   Reason for Referral: chest pain   Chief Complaint:   Chief Complaint   Patient presents with    Chest Pain     Pt into Ed for chest pain 10/10. Pt states chest pain started at 5 am. Pt took nitro then and helped with pain. Pt states pain is now coming back . Pt does have cardiac hx. Pt states left sided chest pain that radiates to jaw and down left arm       Subjective:     History of Present Illness: The patient is 69 y.o. female with a past medical history significant for extensive CAD with multiple prior PCI, watchman device, prior IVC  angioplasty, DM, HTN, HL, CKD, who presents with the above complaint. Nonexertional chest pain that is only relived by morphine. Worse with deep inspiration. Currently she denies chest pain, PND, orthopnea, dyspnea at rest, palpitations, syncope or edema.       Past Medical History:   Diagnosis Date    Acid reflux     Anemia     Anxiety and depression     Arthritis     Asthma     Atrial fibrillation ([facility])     CAD (coronary artery disease) 12/03/2012    Cerebral artery occlusion with cerebral infarction ([facility])     TIA\"\"S--right sided weakness & headache    CHF (congestive heart failure) ([facility])     Chronic kidney disease--stage III     40% kidney function    COPD (chronic obstructive pulmonary disease) ([facility])     DM2 (diabetes mellitus, type 2) ([facility])     Dysarthria     Fibromyalgia 06/07/2016    Headache(784.0) 02/19/2013    Hemisensory loss     History of blood transfusion 11/2020    pt denies having transfusion reaction    Hyperlipidemia     Hypertension     IBS (irritable bowel syndrome)     Inferior vena cava occlusion (HCC)     Keratitis     Meningioma ([facility])     MI, old     Neuropathy     Superior vena cava obstruction     Temporal arteritis ([facility]) 02/24/2014    Type 2 diabetes mellitus with hypoglycemia unawareness ([facility]) 
40% kidney function    COPD (chronic obstructive pulmonary disease) (HCC)     DM2 (diabetes mellitus, type 2) (HCC)     Dysarthria     Fibromyalgia 6/7/2016    Headache(784.0) 2/19/2013    Hemisensory loss     History of blood transfusion 11/2020    pt denies having transfusion reaction    Hyperlipidemia     Hypertension     IBS (irritable bowel syndrome)     Inferior vena cava occlusion (HCC)     Keratitis     Meningioma (HCC)     MI, old     Neuropathy     Superior vena cava obstruction     Temporal arteritis (HCC) 2/24/2014    Wears glasses        Past Surgical History:   Procedure Laterality Date    ABLATION OF DYSRHYTHMIC FOCUS  1999  and 11/2020    times 2    ARTERY BIOPSY Right 04/23/2014    RIGHT TEMPORAL ARTERY BIOPSY    CAROTID ARTERY SURGERY Left     clean up per pt    CATARACT REMOVAL Bilateral     CHOLECYSTECTOMY      COLONOSCOPY N/A 04/09/2021    COLONOSCOPY WITH BIOPSY performed by Gera Matthews MD at [facility] ENDOSCOPY    COLONOSCOPY N/A 10/15/2021    COLONOSCOPY performed by Gera Matthews MD at [facility] ENDOSCOPY    COLONOSCOPY N/A 07/02/2024    COLONOSCOPY POLYPECTOMY SNARE/BIOPSY performed by Marcio Castillo MD at [facility] ENDOSCOPY    CORONARY ANGIOPLASTY WITH STENT PLACEMENT  2020    HYSTERECTOMY (CERVIX STATUS UNKNOWN)      JOINT REPLACEMENT Right     KNEE ARTHROSCOPY Right     PORT SURGERY      REMOVAL FROM THIGH, 2022 est. per patient    PTCA  10/2019    LAD and RCA inrtervention    TUNNELED VENOUS PORT PLACEMENT      left thigh.  SMART PORT-----Removed--total of 4 port placement and removal    UPPER GASTROINTESTINAL ENDOSCOPY N/A 07/06/2020    EGD DIAGNOSTIC ONLY performed by Jose Pickens MD at [facility] ENDOSCOPY    UPPER GASTROINTESTINAL ENDOSCOPY N/A 07/01/2024    ESOPHAGOGASTRODUODENOSCOPY BIOPSY performed by Marcio Castillo MD at [facility] ENDOSCOPY       Allergies:  Allergies   Allergen Reactions    Bee Venom Shortness Of Breath        Social Drivers of Health with Concerns     Tobacco Use: High

## 2025-05-06 NOTE — PLAN OF CARE
Problem: Chronic Conditions and Co-morbidities  Goal: Patient's chronic conditions and co-morbidity symptoms are monitored and maintained or improved  Outcome: Progressing  Flowsheets (Taken 5/5/2025 2000)  Care Plan - Patient's Chronic Conditions and Co-Morbidity Symptoms are Monitored and Maintained or Improved:   Monitor and assess patient's chronic conditions and comorbid symptoms for stability, deterioration, or improvement   Collaborate with multidisciplinary team to address chronic and comorbid conditions and prevent exacerbation or deterioration   Update acute care plan with appropriate goals if chronic or comorbid symptoms are exacerbated and prevent overall improvement and discharge     Problem: Discharge Planning  Goal: Discharge to home or other facility with appropriate resources  Outcome: Progressing  Flowsheets (Taken 5/5/2025 2000)  Discharge to home or other facility with appropriate resources:   Identify barriers to discharge with patient and caregiver   Arrange for needed discharge resources and transportation as appropriate   Refer to discharge planning if patient needs post-hospital services based on physician order or complex needs related to functional status, cognitive ability or social support system   Identify discharge learning needs (meds, wound care, etc)   Arrange for interpreters to assist at discharge as needed     Problem: Pain  Goal: Verbalizes/displays adequate comfort level or baseline comfort level  Outcome: Progressing  Flowsheets (Taken 5/5/2025 2007)  Verbalizes/displays adequate comfort level or baseline comfort level:   Encourage patient to monitor pain and request assistance   Assess pain using appropriate pain scale   Administer analgesics based on type and severity of pain and evaluate response   Implement non-pharmacological measures as appropriate and evaluate response   Notify Licensed Independent Practitioner if interventions unsuccessful or patient reports new

## 2025-05-06 NOTE — PROGRESS NOTES
Patient provided with discharge instructions, discussed in detail, new medications reviewed including use and side effects.  Patient verbalized understanding. Prescriptions filled per outpatient pharmacy and delivered at bedside. All questions answered, Brought Pt to Discharge bridge where son was waiting to transport home. Pt assisted into car without incident.  Patient discharged home on 05/06/2025  at 1510

## 2025-05-06 NOTE — PROGRESS NOTES
[facility]  Glycemic Outcome      NAME: Maren Meza  MEDICAL RECORD NUMBER:  [id number]  AGE: 69 y.o.   GENDER: female  : 1955  EPISODE DATE:  2025     Data     Recent Labs     25  1219 25  1749 25  1930 25  0129 25  0541 25  1026   POCGLU 86 124* 170* 130* 136* 172*       HgBA1c:    Lab Results   Component Value Date/Time    LABA1C 9.6 2025 05:08 PM       BUN/Creatinine:    Lab Results   Component Value Date/Time    BUN 33 2025 07:02 AM    CREATININE 1.8 2025 07:02 AM     GFR Estimated Creatinine Clearance: 21 mL/min (A) (based on SCr of 1.8 mg/dL (H)).    FIB-4 Calculation: 2.05 at 2025  4:08 AM  Calculated from:  SGOT/AST: 37 U/L at 2025  2:21 PM  SGPT/ALT: 71 U/L at 2025  2:21 PM  Platelets: 146 K/uL at 2025  4:08 AM  Age: 69 years    Medications  Scheduled Medications:   iron sucrose  200 mg IntraVENous Q24H    insulin lispro  0-4 Units SubCUTAneous Q4H    insulin lispro  2 Units SubCUTAneous TID WC    insulin glargine  0.15 Units/kg SubCUTAneous Nightly    heparin (porcine)  5,000 Units SubCUTAneous 3 times per day    aspirin  81 mg Oral Daily    atorvastatin  80 mg Oral Nightly    docusate sodium  100 mg Oral Once per day on     budesonide-formoterol  2 puff Inhalation BID RT    hydrALAZINE  100 mg Oral TID    isosorbide mononitrate  60 mg Oral BID    lamoTRIgine  25 mg Oral Daily    levETIRAcetam  500 mg Oral BID    metoprolol succinate  50 mg Oral BID    montelukast  10 mg Oral Daily    pantoprazole  40 mg Oral QAM AC    QUEtiapine  100 mg Oral Nightly    sodium bicarbonate  650 mg Oral TID    ticagrelor  90 mg Oral BID    sodium chloride flush  5-40 mL IntraVENous 2 times per day    sodium chloride  15 mL/kg IntraVENous Once    memantine  10 mg Oral Daily    amLODIPine  2.5 mg Oral Daily       Diet  Current diet/supplement order: ADULT DIET; Regular; 4 carb choices (60 gm/meal); Low

## 2025-05-07 ENCOUNTER — FOLLOWUP TELEPHONE ENCOUNTER (OUTPATIENT)
Dept: CARDIOVASCULAR ICU | Age: 70
End: 2025-05-07

## 2025-05-07 NOTE — TELEPHONE ENCOUNTER
Granddaughter Ina Gonzalez picked up her grandmother's (Maren Mark) medications on 5/7/2025 0915.  [phone].

## 2025-05-07 NOTE — PROGRESS NOTES
Physician Progress Note      PATIENT:               ARISTEO HAM  [facility] #:                  [id number]  :                       1955  ADMIT DATE:       2025 11:44 AM  DISCH DATE:        2025 3:10 PM  RESPONDING  PROVIDER #:        Kayode Bush DO          QUERY TEXT:    Elevated cardiac troponin levels are noted. Please clarify the cause:    The clinical indicators include:  - Admitted with chest pain  - Hx CKD 3b/4  - Trended troponin 99, 95  - Cardiology consulted  - Nuclear stress showed \"no evidence of inducible ischemia\"  Options provided:  -- Non-ischemic myocardial injury due to CKD  -- Non-ischemic myocardial injury related to other cause, Please specify   cause.  -- Other - I will add my own diagnosis  -- Disagree - Not applicable / Not valid  -- Disagree - Clinically unable to determine / Unknown  -- Refer to Clinical Documentation Reviewer    PROVIDER RESPONSE TEXT:    Non-ischemic myocardial injury due to CKD    Query created by: Oly Domínguez on 2025 3:50 AM      QUERY TEXT:    \"Diastolic congestive heart failure exacerbation\" was documented in the H&P    The diagnosis was not noted in subsequent documentation.  Please clarify the   status of this condition.    The clinical indicators include:  - Hx HTN, CKD, CHF  - Pro-BNP 3373 on admission  - CXR on admission showed \"no acute process\"  - Cardiology consulted  Options provided:  -- Acute on chronic diastolic CHF  -- Chronic diastolic CHF.  Acute diastolic CHF ruled out after study  -- Other - I will add my own diagnosis  -- Disagree - Not applicable / Not valid  -- Disagree - Clinically unable to determine / Unknown  -- Refer to Clinical Documentation Reviewer    PROVIDER RESPONSE TEXT:    Chronic diastolic CHF. Acute diastolic CHF ruled out after study    Query created by: Oly Domínguez on 2025 3:50 AM      Electronically signed by:  Kayode Bush DO 2025 7:11 AM

## 2025-05-08 LAB — BACTERIA BLD CULT: NORMAL

## 2025-05-09 LAB — BACTERIA BLD CULT ORG #2: NORMAL

## (undated) DEVICE — ENDOSCOPIC KIT 6X3/16 FT COLON W/ 1.1 OZ 2 GWN W/O BRSH

## (undated) DEVICE — ENDOSCOPY KIT: Brand: MEDLINE INDUSTRIES, INC.

## (undated) DEVICE — BITE BLOCK ENDOSCP AD 60 FR W/ ADJ STRP PLAS GRN BLOX

## (undated) DEVICE — BW-412T DISP COMBO CLEANING BRUSH: Brand: SINGLE USE COMBINATION CLEANING BRUSH

## (undated) DEVICE — CONTAINER SPEC 480ML CLR POLYSTYR 10% NEUT BUFF FRMLN ZN

## (undated) DEVICE — MOUTHPIECE ENDOSCP L CTRL OPN AND SIDE PORTS DISP

## (undated) DEVICE — FORCEPS BX 240CM 2.4MM L NDL RAD JAW 4 M00513334

## (undated) DEVICE — SNARE COLD DIAMOND 15MM THIN

## (undated) DEVICE — FORCEPS BX L240CM WRK CHN 2.8MM STD CAP W/ NDL MIC MESH

## (undated) DEVICE — TRAP SPEC RETRV CLR PLAS POLYP IN LN SUCT QUIK CTCH

## (undated) DEVICE — SINGLE USE AIR/WATER, SUCTION AND BIOPSY VALVES SET: Brand: ORCAPOD™

## (undated) DEVICE — SOLUTION IRRIG 500ML STRL H2O NONPYROGENIC

## (undated) DEVICE — AIR/WATER CLEANING ADAPTER FOR OLYMPUS® GI ENDOSCOPE: Brand: BULLDOG®